# Patient Record
Sex: MALE | Race: WHITE | NOT HISPANIC OR LATINO | Employment: OTHER | URBAN - METROPOLITAN AREA
[De-identification: names, ages, dates, MRNs, and addresses within clinical notes are randomized per-mention and may not be internally consistent; named-entity substitution may affect disease eponyms.]

---

## 2017-01-03 ENCOUNTER — OFFICE VISIT (OUTPATIENT)
Dept: FAMILY MEDICINE CLINIC | Facility: CLINIC | Age: 77
End: 2017-01-03

## 2017-01-03 VITALS
WEIGHT: 166.9 LBS | DIASTOLIC BLOOD PRESSURE: 78 MMHG | HEART RATE: 84 BPM | HEIGHT: 71 IN | RESPIRATION RATE: 16 BRPM | BODY MASS INDEX: 23.36 KG/M2 | SYSTOLIC BLOOD PRESSURE: 128 MMHG | OXYGEN SATURATION: 96 % | TEMPERATURE: 98.4 F

## 2017-01-03 DIAGNOSIS — J47.9 BRONCHIECTASIS WITHOUT COMPLICATION (HCC): Primary | ICD-10-CM

## 2017-01-03 DIAGNOSIS — J18.9 PNEUMONIA OF LEFT UPPER LOBE DUE TO INFECTIOUS ORGANISM: ICD-10-CM

## 2017-01-03 PROCEDURE — 99214 OFFICE O/P EST MOD 30 MIN: CPT | Performed by: INTERNAL MEDICINE

## 2017-01-03 RX ORDER — METHYLPHENIDATE HYDROCHLORIDE 10 MG/1
10 TABLET ORAL
Qty: 90 TABLET | Refills: 0 | Status: ON HOLD | OUTPATIENT
Start: 2017-01-03 | End: 2017-06-02

## 2017-01-03 RX ORDER — ALBUTEROL SULFATE 1.25 MG/3ML
1 SOLUTION RESPIRATORY (INHALATION) EVERY 6 HOURS PRN
Qty: 360 ML | Refills: 3 | Status: ON HOLD | OUTPATIENT
Start: 2017-01-03 | End: 2017-05-05

## 2017-01-03 RX ORDER — BENZONATATE 200 MG/1
200 CAPSULE ORAL 2 TIMES DAILY PRN
Qty: 30 CAPSULE | Refills: 0 | Status: SHIPPED | OUTPATIENT
Start: 2017-01-03 | End: 2017-04-04

## 2017-01-03 RX ORDER — CLOTRIMAZOLE AND BETAMETHASONE DIPROPIONATE 10; .64 MG/G; MG/G
CREAM TOPICAL 2 TIMES DAILY
Qty: 15 G | Refills: 0 | Status: SHIPPED | OUTPATIENT
Start: 2017-01-03 | End: 2017-01-06 | Stop reason: SDUPTHER

## 2017-01-03 RX ORDER — FLUOXETINE HYDROCHLORIDE 60 MG/1
60 TABLET, FILM COATED ORAL; ORAL DAILY
Qty: 30 TABLET | Refills: 3 | Status: SHIPPED | OUTPATIENT
Start: 2017-01-03 | End: 2017-10-13 | Stop reason: SDUPTHER

## 2017-01-03 NOTE — PROGRESS NOTES
Subjective   Tony Leonard is a 76 y.o. male.     History of Present Illness   Patient was seen for follow-up from the hospital for pneumonia.  He's been on albuterol mini nebs with Spiriva.  He also is taking Levaquin.  He did have a groin rash consistent with fungus.  Patient states he is much improved from the hospital did not want to adjust his antibiotics or breathing treatment.  He was given Lotrisone cream for his rash.  He did have his albuterol refilled.  He did not want an x-ray.    Much of this encounter note is an electronic transcription/translation of spoken language to printed text.  The electronic translation of spoken language may permit erroneous, or at times, nonsensical words or phrases to be inadvertently transcribed.  Although I have reviewed the note for such errors, some may still exist.  The following portions of the patient's history were reviewed and updated as appropriate: allergies, current medications, past family history, past medical history, past social history, past surgical history and problem list.    Review of Systems   Constitutional: Negative for fatigue and fever.   HENT: Positive for congestion. Negative for trouble swallowing.    Eyes: Negative for discharge and visual disturbance.   Respiratory: Positive for cough and wheezing. Negative for choking and shortness of breath.    Cardiovascular: Negative for chest pain and palpitations.   Gastrointestinal: Negative for abdominal pain and blood in stool.   Endocrine: Negative.    Genitourinary: Negative for genital sores and hematuria.   Musculoskeletal: Negative for gait problem and joint swelling.   Skin: Negative for color change, pallor, rash and wound.   Allergic/Immunologic: Positive for environmental allergies. Negative for immunocompromised state.   Neurological: Negative for facial asymmetry and speech difficulty.   Psychiatric/Behavioral: Negative for hallucinations and suicidal ideas.       Objective   Physical  Exam   Constitutional: He is oriented to person, place, and time. He appears well-developed and well-nourished.   HENT:   Head: Normocephalic.   Eyes: Conjunctivae are normal. Pupils are equal, round, and reactive to light.   Neck: Normal range of motion. Neck supple.   Cardiovascular: Normal rate, regular rhythm and normal heart sounds.  Exam reveals no gallop and no friction rub.    No murmur heard.  Pulmonary/Chest: Effort normal. No respiratory distress. He has wheezes. He has no rales. He exhibits no tenderness.   Abdominal: Soft. Bowel sounds are normal.   Musculoskeletal: Normal range of motion.   Neurological: He is alert and oriented to person, place, and time.   Skin: Skin is warm and dry.   Psychiatric: He has a normal mood and affect. His behavior is normal. Judgment and thought content normal.   Nursing note and vitals reviewed.      Assessment/Plan   Problems Addressed this Visit        Respiratory    Bronchiectasis without complication - Primary    Relevant Medications    tiotropium (SPIRIVA) 18 MCG per inhalation capsule    albuterol (ACCUNEB) 1.25 MG/3ML nebulizer solution    benzonatate (TESSALON) 200 MG capsule    Pneumonia of left upper lobe due to infectious organism    Relevant Medications    tiotropium (SPIRIVA) 18 MCG per inhalation capsule    albuterol (ACCUNEB) 1.25 MG/3ML nebulizer solution    benzonatate (TESSALON) 200 MG capsule

## 2017-01-03 NOTE — MR AVS SNAPSHOT
Tony Leonard   1/3/2017 5:45 PM   Office Visit    Dept Phone:  [phone]   Encounter #:  [id number]    Provider:  Erich Aviles MD   Department:  [facility] FAMILY AND INTERNAL MED                Your Full Care Plan              Today's Medication Changes          These changes are accurate as of: 1/3/17  6:13 PM.  If you have any questions, ask your nurse or doctor.               New Medication(s)Ordered:     benzonatate 200 MG capsule   Commonly known as:  TESSALON   Take 1 capsule by mouth 2 (Two) Times a Day As Needed for cough.   Started by:  Erich Aviles MD       clotrimazole-betamethasone 1-0.05 % cream   Commonly known as:  LOTRISONE   Apply  topically 2 (Two) Times a Day.   Started by:  Erich Aviles MD         Medication(s)that have changed:     albuterol 1.25 MG/3ML nebulizer solution   Commonly known as:  ACCUNEB   Take 3 mL by nebulization Every 6 (Six) Hours As Needed for wheezing.   What changed:  Another medication with the same name was removed. Continue taking this medication, and follow the directions you see here.   Changed by:  Erich Aviles MD       FLUoxetine 60 MG tablet   Commonly known as:  PROzac   Take 1 tablet by mouth Daily.   What changed:  how much to take   Changed by:  Erich Aviles MD         Stop taking medication(s)listed here:     HYDROcod Polst-CPM Polst ER 10-8 MG/5ML ER suspension   Commonly known as:  TUSSIONEX PENNKINETIC   Stopped by:  Erich Aviles MD           Umeclidinium-Vilanterol 62.5-25 MCG/INH aerosol powder    Stopped by:  Erich Aviles MD                Where to Get Your Medications      These medications were sent to [address] - [address] [place] [address] AT [facility] & (CHET[place] A - [phone]  - [phone] [place] [address]     Phone:  [phone]     albuterol 1.25 MG/3ML nebulizer solution    benzonatate 200 MG capsule    clotrimazole-betamethasone 1-0.05 % cream    FLUoxetine 60 MG tablet         You can get these medications from any pharmacy     Bring a paper prescription for each of these medications     methylphenidate 10 MG tablet                  Your Updated Medication List          This list is accurate as of: 1/3/17  6:13 PM.  Always use your most recent med list.                albuterol 1.25 MG/3ML nebulizer solution   Commonly known as:  ACCUNEB   Take 3 mL by nebulization Every 6 (Six) Hours As Needed for wheezing.       benzonatate 200 MG capsule   Commonly known as:  TESSALON   Take 1 capsule by mouth 2 (Two) Times a Day As Needed for cough.       clotrimazole-betamethasone 1-0.05 % cream   Commonly known as:  LOTRISONE   Apply  topically 2 (Two) Times a Day.       cyclobenzaprine 10 MG tablet   Commonly known as:  FLEXERIL   Take 1 tablet by mouth 3 (three) times a day as needed for muscle spasms. Do not drive       esomeprazole 40 MG capsule   Commonly known as:  nexIUM   Take 1 capsule by mouth every morning before breakfast.       fexofenadine 60 MG tablet   Commonly known as:  ALLEGRA       FLUoxetine 60 MG tablet   Commonly known as:  PROzac   Take 1 tablet by mouth Daily.       glucosamine-chondroitin 500-400 MG capsule capsule       levoFLOXacin 750 MG tablet   Commonly known as:  LEVAQUIN   Take 1 tablet by mouth Daily.       methylphenidate 10 MG tablet   Commonly known as:  RITALIN   Take 1 tablet by mouth 3 (Three) Times a Day With Meals.       MULTIVITAMIN ADULT PO       NF FORMULAS TESTOSTERONE PO       predniSONE 10 MG tablet   Commonly known as:  DELTASONE   4 tabs daily for 2 days, 3 tabs daily for 2 days, 2 tabs daily for 2 days, 1 tab daily for 2 day then stop       tamsulosin 0.4 MG capsule 24 hr capsule   Commonly known as:  FLOMAX   Take 1 capsule by mouth Every Night for 30 days.       tiotropium 18 MCG per inhalation capsule   Commonly known as:  SPIRIVA               You Were Diagnosed With   "      Codes Comments    Bronchiectasis without complication    -  Primary ICD-10-CM: J47.9  ICD-9-CM: 494.0     Pneumonia of left upper lobe due to infectious organism     ICD-10-CM: J18.9  ICD-9-CM: 483.8       Instructions     None    Patient Instructions History      Upcoming Appointments     Visit Type Date Time Department    SAME DAY 1/3/2017  5:45 PM [facility]    SAME DAY 1/17/2017 10:15 AM [facility]  [facility]      MyChart Signup     Our records indicate that you have declined [facility] MyChart signup. If you would like to sign up for [employer], please email [email or web address] or call [phone] to obtain an activation code.             Other Info from Your Visit           Your Appointments     Jan 17, 2017 10:15 AM EST   Same Day with Erich Aviles MD   [facility] FAMILY AND INTERNAL MED (--)    [address] [facility] [address] [phone]              Allergies     Azithromycin      Latex      Sulfa Antibiotics  Rash      Reason for Visit     Med Refill d/w doc from hosp visit    Sore Throat thick mucous    Rash groin area      Vital Signs     Blood Pressure Pulse Temperature Respirations Height Weight    128/78 84 98.4 °F (36.9 °C) 16 71\" (180.3 cm) 166 lb 14.4 oz (75.7 kg)    Oxygen Saturation Body Mass Index Smoking Status             96% 23.28 kg/m2 Never Smoker         Problems and Diagnoses Noted     Abnormal dilation of airways    Pneumonia of left upper lobe due to infectious organism      No Longer an Issue     Bacterial lobar pneumonia    Chronic bronchitis        "

## 2017-01-06 RX ORDER — CLOTRIMAZOLE AND BETAMETHASONE DIPROPIONATE 10; .64 MG/G; MG/G
CREAM TOPICAL 2 TIMES DAILY
Qty: 45 G | Refills: 0 | Status: SHIPPED | OUTPATIENT
Start: 2017-01-06 | End: 2017-07-22

## 2017-01-17 ENCOUNTER — OFFICE VISIT (OUTPATIENT)
Dept: FAMILY MEDICINE CLINIC | Facility: CLINIC | Age: 77
End: 2017-01-17

## 2017-01-17 VITALS
SYSTOLIC BLOOD PRESSURE: 120 MMHG | RESPIRATION RATE: 14 BRPM | DIASTOLIC BLOOD PRESSURE: 68 MMHG | HEART RATE: 76 BPM | TEMPERATURE: 97.5 F | WEIGHT: 172 LBS | OXYGEN SATURATION: 98 % | BODY MASS INDEX: 24.08 KG/M2 | HEIGHT: 71 IN

## 2017-01-17 DIAGNOSIS — J18.9 PNEUMONIA OF LEFT UPPER LOBE DUE TO INFECTIOUS ORGANISM: Primary | ICD-10-CM

## 2017-01-17 DIAGNOSIS — J44.9 CHRONIC OBSTRUCTIVE PULMONARY DISEASE, UNSPECIFIED COPD TYPE (HCC): ICD-10-CM

## 2017-01-17 DIAGNOSIS — E78.5 DYSLIPIDEMIA: ICD-10-CM

## 2017-01-17 PROCEDURE — 99214 OFFICE O/P EST MOD 30 MIN: CPT | Performed by: INTERNAL MEDICINE

## 2017-01-17 RX ORDER — GUAIFENESIN 600 MG/1
1200 TABLET, EXTENDED RELEASE ORAL 2 TIMES DAILY
Qty: 120 TABLET | Refills: 3
Start: 2017-01-17 | End: 2017-02-15 | Stop reason: SDUPTHER

## 2017-01-17 RX ORDER — BUDESONIDE 0.5 MG/2ML
0.5 INHALANT ORAL
COMMUNITY
End: 2017-02-15

## 2017-01-17 NOTE — PROGRESS NOTES
Subjective   Tony Leonard is a 76 y.o. male.     History of Present Illness   A she is following up for pneumonia.  He has finished his Levaquin.  She still has a residual cough but is nonproductive.  Think going on for approximately 2 weeks.  He has slight congestion is been using Mucinex for the congestion.  It has been working.  Patient did not want an x-ray and will follow-up in 3 months.    Much of this encounter note is an electronic transcription/translation of spoken language to printed text.  The electronic translation of spoken language may permit erroneous, or at times, nonsensical words or phrases to be inadvertently transcribed.  Although I have reviewed the note for such errors, some may still exist.  The following portions of the patient's history were reviewed and updated as appropriate: allergies, current medications, past family history, past medical history, past social history, past surgical history and problem list.    Review of Systems   Constitutional: Negative for fatigue and fever.   HENT: Positive for congestion. Negative for trouble swallowing.    Eyes: Negative for discharge and visual disturbance.   Respiratory: Positive for cough. Negative for choking and shortness of breath.    Cardiovascular: Negative for chest pain and palpitations.   Gastrointestinal: Negative for abdominal pain and blood in stool.   Endocrine: Negative.    Genitourinary: Negative for genital sores and hematuria.   Musculoskeletal: Negative for gait problem and joint swelling.   Skin: Negative for color change, pallor, rash and wound.   Allergic/Immunologic: Positive for environmental allergies. Negative for immunocompromised state.   Neurological: Negative for facial asymmetry and speech difficulty.   Psychiatric/Behavioral: Negative for hallucinations and suicidal ideas.       Objective   Physical Exam   Constitutional: He is oriented to person, place, and time. He appears well-developed and well-nourished.    HENT:   Head: Normocephalic.   Eyes: Conjunctivae are normal. Pupils are equal, round, and reactive to light.   Neck: Normal range of motion. Neck supple.   Cardiovascular: Normal rate, regular rhythm and normal heart sounds.  Exam reveals no gallop and no friction rub.    No murmur heard.  Pulmonary/Chest: Effort normal and breath sounds normal. No respiratory distress. He has no wheezes. He has no rales. He exhibits no tenderness.   Abdominal: Soft. Bowel sounds are normal.   Musculoskeletal: Normal range of motion.   Neurological: He is alert and oriented to person, place, and time.   Skin: Skin is warm and dry.   Psychiatric: He has a normal mood and affect. His behavior is normal. Judgment and thought content normal.   Nursing note and vitals reviewed.      Assessment/Plan   Problems Addressed this Visit        Respiratory    Pneumonia of left upper lobe due to infectious organism - Primary    Relevant Medications    budesonide (PULMICORT) 0.5 MG/2ML nebulizer solution    tiotropium (SPIRIVA) 18 MCG per inhalation capsule    guaiFENesin (MUCINEX) 600 MG 12 hr tablet    COPD (chronic obstructive pulmonary disease)    Relevant Medications    budesonide (PULMICORT) 0.5 MG/2ML nebulizer solution    tiotropium (SPIRIVA) 18 MCG per inhalation capsule    guaiFENesin (MUCINEX) 600 MG 12 hr tablet       Other    Dyslipidemia

## 2017-01-17 NOTE — MR AVS SNAPSHOT
Tony Leonard   1/17/2017 10:15 AM   Office Visit    Dept Phone:  [phone]   Encounter #:  [id number]    Provider:  Erich Aviles MD   Department:  [facility] FAMILY AND INTERNAL MED                Your Full Care Plan              Today's Medication Changes          These changes are accurate as of: 1/17/17 10:47 AM.  If you have any questions, ask your nurse or doctor.               New Medication(s)Ordered:     guaiFENesin 600 MG 12 hr tablet   Commonly known as:  MUCINEX   Take 2 tablets by mouth 2 (Two) Times a Day.   Started by:  Erich Aviles MD         Stop taking medication(s)listed here:     levoFLOXacin 750 MG tablet   Commonly known as:  LEVAQUIN   Stopped by:  Erich Aviles MD           predniSONE 10 MG tablet   Commonly known as:  DELTASONE   Stopped by:  Erich Aviles MD                Where to Get Your Medications      These medications were sent to [facility] [address] [phone] [facility] [phone] [address] [address]     Phone:  [phone]     tiotropium 18 MCG per inhalation capsule         Information about where to get these medications is not yet available     ! Ask your nurse or doctor about these medications     guaiFENesin 600 MG 12 hr tablet                  Your Updated Medication List          This list is accurate as of: 1/17/17 10:47 AM.  Always use your most recent med list.                albuterol 1.25 MG/3ML nebulizer solution   Commonly known as:  ACCUNEB   Take 3 mL by nebulization Every 6 (Six) Hours As Needed for wheezing.       benzonatate 200 MG capsule   Commonly known as:  TESSALON   Take 1 capsule by mouth 2 (Two) Times a Day As Needed for cough.       budesonide 0.5 MG/2ML nebulizer solution   Commonly known as:  PULMICORT       clotrimazole-betamethasone 1-0.05 % cream   Commonly known as:  LOTRISONE   Apply  topically 2 (Two) Times  a Day.       cyclobenzaprine 10 MG tablet   Commonly known as:  FLEXERIL   Take 1 tablet by mouth 3 (three) times a day as needed for muscle spasms. Do not drive       esomeprazole 40 MG capsule   Commonly known as:  nexIUM   Take 1 capsule by mouth every morning before breakfast.       fexofenadine 60 MG tablet   Commonly known as:  ALLEGRA       FLUoxetine 60 MG tablet   Commonly known as:  PROzac   Take 1 tablet by mouth Daily.       glucosamine-chondroitin 500-400 MG capsule capsule       guaiFENesin 600 MG 12 hr tablet   Commonly known as:  MUCINEX   Take 2 tablets by mouth 2 (Two) Times a Day.       methylphenidate 10 MG tablet   Commonly known as:  RITALIN   Take 1 tablet by mouth 3 (Three) Times a Day With Meals.       MULTIVITAMIN ADULT PO       NF FORMULAS TESTOSTERONE PO       tamsulosin 0.4 MG capsule 24 hr capsule   Commonly known as:  FLOMAX   Take 1 capsule by mouth Every Night for 30 days.       TESTOSTERONE BU       tiotropium 18 MCG per inhalation capsule   Commonly known as:  SPIRIVA   Place 1 capsule into inhaler and inhale Daily.               You Were Diagnosed With        Codes Comments    Pneumonia of left upper lobe due to infectious organism    -  Primary ICD-10-CM: J18.9  ICD-9-CM: 483.8     Chronic obstructive pulmonary disease, unspecified COPD type     ICD-10-CM: J44.9  ICD-9-CM: 496     Dyslipidemia     ICD-10-CM: E78.5  ICD-9-CM: 272.4       Instructions     None    Patient Instructions History      Upcoming Appointments     Visit Type Date Time Department    SAME DAY 1/17/2017 10:15 AM [facility]    OFFICE VISIT 4/25/2017 10:30 AM [facility]      MyChart Signup     Our records indicate that you have declined [employer] signup. If you would like to sign up for [employer], please email [email or web address] or call [phone] to obtain an activation code.             Other Info from Your Visit           Your Appointments     Apr 25, 2017 10:30 AM EDT   Office  "Visit with Erich Aviles MD   [facility] FAMILY AND INTERNAL MED (--)    [address] [facility] [address] [phone]           Arrive 15 minutes prior to appointment.              Allergies     Azithromycin      Latex      Sulfa Antibiotics  Rash      Reason for Visit     Follow-up pneumonia/Pt states feeling better      Vital Signs     Blood Pressure Pulse Temperature Respirations Height Weight    120/68 (BP Location: Left arm, Patient Position: Sitting, Cuff Size: Adult) 76 97.5 °F (36.4 °C) (Oral) 14 71\" (180.3 cm) 172 lb (78 kg)    Oxygen Saturation Body Mass Index Smoking Status             98% 23.99 kg/m2 Never Smoker         Problems and Diagnoses Noted     Chronic airway obstruction    Dyslipidemia    Pneumonia of left upper lobe due to infectious organism        "

## 2017-02-09 ENCOUNTER — TELEPHONE (OUTPATIENT)
Dept: FAMILY MEDICINE CLINIC | Facility: CLINIC | Age: 77
End: 2017-02-09

## 2017-02-15 ENCOUNTER — OFFICE VISIT (OUTPATIENT)
Dept: FAMILY MEDICINE CLINIC | Facility: CLINIC | Age: 77
End: 2017-02-15

## 2017-02-15 VITALS
HEART RATE: 81 BPM | OXYGEN SATURATION: 97 % | HEIGHT: 71 IN | DIASTOLIC BLOOD PRESSURE: 60 MMHG | WEIGHT: 166.8 LBS | RESPIRATION RATE: 16 BRPM | BODY MASS INDEX: 23.35 KG/M2 | TEMPERATURE: 97.9 F | SYSTOLIC BLOOD PRESSURE: 92 MMHG

## 2017-02-15 DIAGNOSIS — J44.9 CHRONIC OBSTRUCTIVE PULMONARY DISEASE, UNSPECIFIED COPD TYPE (HCC): ICD-10-CM

## 2017-02-15 DIAGNOSIS — R53.83 FATIGUE, UNSPECIFIED TYPE: ICD-10-CM

## 2017-02-15 DIAGNOSIS — R05.9 COUGH: Primary | ICD-10-CM

## 2017-02-15 DIAGNOSIS — J18.9 PNEUMONIA OF LEFT UPPER LOBE DUE TO INFECTIOUS ORGANISM: ICD-10-CM

## 2017-02-15 LAB
ERYTHROCYTE [DISTWIDTH] IN BLOOD BY AUTOMATED COUNT: 13.1 % (ref 4.5–15)
HCT VFR BLD AUTO: 38.9 % (ref 35–60)
HGB BLD-MCNC: 13 G/DL (ref 13.5–18)
LYMPHOCYTES # BLD AUTO: 2.4 10*3/MM3 (ref 1.2–3.4)
LYMPHOCYTES NFR BLD AUTO: 25.6 % (ref 21–51)
MCH RBC QN AUTO: 30.9 PG (ref 26.1–33.1)
MCHC RBC AUTO-ENTMCNC: 33.5 G/DL (ref 33–37)
MCV RBC AUTO: 92.5 FL (ref 80–99)
MONOCYTES # BLD AUTO: 0.7 10*3/MM3 (ref 0.1–0.6)
MONOCYTES NFR BLD AUTO: 7.5 % (ref 2–9)
NEUTROPHILS # BLD AUTO: 6.2 10*3/MM3 (ref 1.4–6.5)
NEUTROPHILS NFR BLD AUTO: 66.9 % (ref 42–75)
PLATELET # BLD AUTO: 260 10*3/MM3 (ref 150–450)
PMV BLD AUTO: 6.9 FL (ref 7.1–10.5)
RBC # BLD AUTO: 4.21 10*6/MM3 (ref 4–6)
WBC NRBC COR # BLD: 9.3 10*3/MM3 (ref 4.5–10)

## 2017-02-15 PROCEDURE — 71020 CHG CHEST X-RAY 2 VW: CPT | Performed by: NURSE PRACTITIONER

## 2017-02-15 PROCEDURE — 99213 OFFICE O/P EST LOW 20 MIN: CPT | Performed by: NURSE PRACTITIONER

## 2017-02-15 PROCEDURE — 85025 COMPLETE CBC W/AUTO DIFF WBC: CPT | Performed by: NURSE PRACTITIONER

## 2017-02-15 RX ORDER — GUAIFENESIN 600 MG/1
1200 TABLET, EXTENDED RELEASE ORAL 2 TIMES DAILY
Qty: 120 TABLET | Refills: 3
Start: 2017-02-15 | End: 2017-06-21 | Stop reason: HOSPADM

## 2017-02-15 RX ORDER — DOXYCYCLINE HYCLATE 100 MG/1
100 TABLET, DELAYED RELEASE ORAL 2 TIMES DAILY
Qty: 14 TABLET | Refills: 0 | Status: SHIPPED | OUTPATIENT
Start: 2017-02-15 | End: 2017-03-28 | Stop reason: HOSPADM

## 2017-02-15 NOTE — PATIENT INSTRUCTIONS
CXR today, will call with results.  Start doxycycline 100mg 2x day for 7 days, finish whole prescription.  Cont albuterol nebulizer as needed,  Cont home O2 2L at night.   Stop spiriva, trial Anoro inhaler 1 puff daily. If any problems stop med and call office. Do not take spiriva and anoro at the same time. smple given and can reassess at next appt with Dr. Aviles in 2 weeks.   CBC, WNL.   May cont mucinex as needed.  Increase fluid intake, get plenty of rest.   Cont scheduled f/u with Dr. Salter.   If SOA , fever, fatigue worsens, patient instructed to go to ER.   Patient agrees with plan of care and understands instructions. Call if worsening symptoms or any problems or concerns.

## 2017-02-15 NOTE — PROGRESS NOTES
Subjective   Tony Leonard is a 76 y.o. male.     History of Present Illness   C/o cough and fatigue. He has productive cough and yellow mucous. He states he feels better today. He states yesterday he as so fatigued he could hardly get up. He has an extensive history of COPD and had pneumonia recently right and left upper lobes. He sees Dr. Salter, pulmonology. He uses oxygen 2L NC every night before. He uses a nebulizer as needed during the day. He denies fever. He is willing to get an xray of his chest today as he has not had a f/u xray since 12/31/16.  He last saw pulm on 12/31/16.  He will f/u with Dr. elliott on 3/2/17.   The following portions of the patient's history were reviewed and updated as appropriate: allergies, current medications, past family history, past medical history, past social history, past surgical history and problem list.     Review of Systems   Constitutional: Positive for fatigue. Negative for chills, diaphoresis and fever.   HENT: Positive for congestion. Negative for ear pain, rhinorrhea, sinus pressure and sore throat.    Respiratory: Positive for cough. Negative for chest tightness, shortness of breath and wheezing.    Cardiovascular: Negative for chest pain.   Musculoskeletal: Negative for arthralgias and myalgias.   Neurological: Negative for dizziness, weakness, light-headedness and headaches.   All other systems reviewed and are negative.      Objective   Physical Exam   Constitutional: He is oriented to person, place, and time. He appears well-developed and well-nourished.   HENT:   Head: Normocephalic.   Eyes: Pupils are equal, round, and reactive to light.   Neck: Neck supple.   Cardiovascular: Normal rate, regular rhythm and normal heart sounds.    Pulmonary/Chest: Effort normal. No respiratory distress. He has wheezes in the right upper field, the right lower field, the left upper field and the left lower field. He has rhonchi in the right upper field, the right lower field,  the left upper field and the left lower field.   Lymphadenopathy:     He has no cervical adenopathy.   Neurological: He is alert and oriented to person, place, and time.   Skin: Skin is warm and dry.   Psychiatric: He has a normal mood and affect. His behavior is normal. Judgment and thought content normal.   Nursing note and vitals reviewed.      Assessment/Plan   Tony was seen today for cough.    Diagnoses and all orders for this visit:    Cough  -     XR Chest 2 View  -     CBC & Differential  -     CBC Auto Differential    Fatigue, unspecified type  -     XR Chest 2 View  -     CBC & Differential  -     CBC Auto Differential    Chronic obstructive pulmonary disease, unspecified COPD type  -     XR Chest 2 View  -     CBC & Differential  -     CBC Auto Differential    Pneumonia of left upper lobe due to infectious organism  -     XR Chest 2 View  -     CBC & Differential  -     CBC Auto Differential    Other orders  -     doxycycline (DORYX) 100 MG enteric coated tablet; Take 1 tablet by mouth 2 (Two) Times a Day.  -     guaiFENesin (MUCINEX) 600 MG 12 hr tablet; Take 2 tablets by mouth 2 (Two) Times a Day.        Cxr 2V today for cough, fatigue, with comparison from old cxr from 12/31/16 showed pneumonia in RUL and GWENDOLYN, today shows asymmetry, awaiting radiology over read.   CXR today, will call with results.  Start doxycycline 100mg 2x day for 7 days, finish whole prescription.  Cont albuterol nebulizer as needed,  Cont home O2 2L at night.   Stop spiriva, trial Anoro inhaler 1 puff daily. If any problems stop med and call office. Do not take spiriva and anoro at the same time. smple given and can reassess at next appt with Dr. Aviles in 2 weeks.   CBC, WNL.   May cont mucinex as needed.  Increase fluid intake, get plenty of rest.   Cont scheduled f/u with Dr. Salter.   If SOA , fever, fatigue worsens, patient instructed to go to ER.   Discusses plan of care with Dr. Erich Aviles.   Patient agrees with plan of  care and understands instructions. Call if worsening symptoms or any problems or concerns.

## 2017-02-22 ENCOUNTER — TELEPHONE (OUTPATIENT)
Dept: FAMILY MEDICINE CLINIC | Facility: CLINIC | Age: 77
End: 2017-02-22

## 2017-02-22 NOTE — TELEPHONE ENCOUNTER
----- Message from PABLITO Means sent at 2/22/2017  3:21 PM EST -----  Contact: PATIENT [phone] NEIGHBORS PHONE   He needs to be seen. If he is SOA or in distress he needs to go to ER.   ----- Message -----     From: Daniela Davis MA     Sent: 2/22/2017   1:56 PM       To: PABLITO Means        ----- Message -----     From: Mili Juarez     Sent: 2/22/2017   1:08 PM       To: Daniela Davis MA    PATIENT WAS SEEN ON 02/15/17 FOR COUGH  AND WAS GIVEN doxycycline (DORYX) 100 MG enteric coated tablet. PATIENT IS STILL SPITTING UP MUCUS AND WANTED TO KNOW IF A REFILL COULD BE CALLED INTO PHARMACY       [address] [place] [address] AT [facility] [address] & ([address] - [phone]  - [phone] (Phone)        Pt informed to go to ER if gets worse

## 2017-02-22 NOTE — TELEPHONE ENCOUNTER
----- Message from PABLITO Means sent at 2/22/2017  4:04 PM EST -----  Please call patient with results. His f/u cxr was normal on 2/15/17, he should be seen if symptoms persist, and go to ER if any worsening symptoms.    Pt informed of CXR results and to go to ER if gets worse

## 2017-03-02 ENCOUNTER — OFFICE VISIT (OUTPATIENT)
Dept: FAMILY MEDICINE CLINIC | Facility: CLINIC | Age: 77
End: 2017-03-02

## 2017-03-02 VITALS
BODY MASS INDEX: 22.82 KG/M2 | DIASTOLIC BLOOD PRESSURE: 58 MMHG | RESPIRATION RATE: 18 BRPM | WEIGHT: 163 LBS | OXYGEN SATURATION: 92 % | SYSTOLIC BLOOD PRESSURE: 98 MMHG | HEART RATE: 82 BPM | TEMPERATURE: 97.8 F | HEIGHT: 71 IN

## 2017-03-02 DIAGNOSIS — M15.9 PRIMARY OSTEOARTHRITIS INVOLVING MULTIPLE JOINTS: ICD-10-CM

## 2017-03-02 DIAGNOSIS — J42 CHRONIC BRONCHITIS, UNSPECIFIED CHRONIC BRONCHITIS TYPE (HCC): ICD-10-CM

## 2017-03-02 DIAGNOSIS — J18.9 PNEUMONIA OF LEFT UPPER LOBE DUE TO INFECTIOUS ORGANISM: Primary | ICD-10-CM

## 2017-03-02 DIAGNOSIS — R27.0 ATAXIA: ICD-10-CM

## 2017-03-02 PROCEDURE — 99214 OFFICE O/P EST MOD 30 MIN: CPT | Performed by: INTERNAL MEDICINE

## 2017-03-02 PROCEDURE — 71020 XR CHEST 2 VW: CPT | Performed by: INTERNAL MEDICINE

## 2017-03-02 NOTE — PROGRESS NOTES
Subjective   Tony Leonard is a 76 y.o. male.     History of Present Illness   Patient was seen for follow-up on pneumonia.  He was doing extremely well finished HIS antibiotics.  He was informed to use his albuterol mini neb as needed and his chest x-ray was clear.  His bronchitis is been well-controlled on the mini neb.  He did complain some ataxia referred to neurologist.  His osteoarthritis been well stable medications.    X-Ray  Interpretation report in house X-rays that I personally viewed    Relevant Clinical Issues/Diagnoses/Indications:  Pneumonia chest x-ray        Clinical Findings:  No acute disease          Comparative Data:  No previous x-ray          Date of Previous X-ray:  Change on current X-ray      Much of this encounter note is an electronic transcription/translation of spoken language to printed text.  The electronic translation of spoken language may permit erroneous, or at times, nonsensical words or phrases to be inadvertently transcribed.  Although I have reviewed the note for such errors, some may still exist.  The following portions of the patient's history were reviewed and updated as appropriate: allergies, current medications, past family history, past medical history, past social history, past surgical history and problem list.    Review of Systems   Constitutional: Negative for fatigue and fever.   HENT: Positive for congestion. Negative for trouble swallowing.    Eyes: Negative for discharge and visual disturbance.   Respiratory: Negative for choking and shortness of breath.    Cardiovascular: Negative for chest pain and palpitations.   Gastrointestinal: Negative for abdominal pain and blood in stool.   Endocrine: Negative.    Genitourinary: Negative for genital sores and hematuria.   Musculoskeletal: Negative for gait problem and joint swelling.   Skin: Negative for color change, pallor, rash and wound.   Allergic/Immunologic: Positive for environmental allergies. Negative for  immunocompromised state.   Neurological: Negative for facial asymmetry and speech difficulty.   Psychiatric/Behavioral: Negative for hallucinations and suicidal ideas.       Objective   Physical Exam   Constitutional: He is oriented to person, place, and time. He appears well-developed and well-nourished.   HENT:   Head: Normocephalic.   Eyes: Conjunctivae are normal. Pupils are equal, round, and reactive to light.   Neck: Normal range of motion. Neck supple.   Cardiovascular: Normal rate, regular rhythm and normal heart sounds.    Pulmonary/Chest: Effort normal and breath sounds normal.   Abdominal: Soft. Bowel sounds are normal.   Musculoskeletal: Normal range of motion.   Neurological: He is alert and oriented to person, place, and time.   Skin: Skin is warm and dry.   Psychiatric: He has a normal mood and affect. His behavior is normal. Judgment and thought content normal.   Nursing note and vitals reviewed.      Assessment/Plan   Problems Addressed this Visit        Respiratory    Pneumonia of left upper lobe due to infectious organism - Primary    Relevant Medications    Umeclidinium-Vilanterol (ANORO ELLIPTA) 62.5-25 MCG/INH aerosol powder     Other Relevant Orders    XR Chest 2 View (Completed)    COPD (chronic obstructive pulmonary disease)    Relevant Medications    Umeclidinium-Vilanterol (ANORO ELLIPTA) 62.5-25 MCG/INH aerosol powder        Musculoskeletal and Integument    Primary osteoarthritis involving multiple joints      Other Visit Diagnoses     Ataxia        Relevant Orders    Ambulatory Referral to Neurology (Completed)

## 2017-03-26 ENCOUNTER — APPOINTMENT (OUTPATIENT)
Dept: GENERAL RADIOLOGY | Facility: HOSPITAL | Age: 77
End: 2017-03-26

## 2017-03-26 ENCOUNTER — HOSPITAL ENCOUNTER (INPATIENT)
Facility: HOSPITAL | Age: 77
LOS: 2 days | Discharge: HOME | End: 2017-03-28
Attending: EMERGENCY MEDICINE | Admitting: INTERNAL MEDICINE

## 2017-03-26 DIAGNOSIS — J44.1 COPD EXACERBATION (HCC): ICD-10-CM

## 2017-03-26 DIAGNOSIS — J18.9 PNEUMONIA OF LEFT LOWER LOBE DUE TO INFECTIOUS ORGANISM: Primary | ICD-10-CM

## 2017-03-26 LAB
ALBUMIN SERPL-MCNC: 3.4 G/DL (ref 3.5–5.2)
ALBUMIN/GLOB SERPL: 1.1 G/DL
ALP SERPL-CCNC: 62 U/L (ref 39–117)
ALT SERPL W P-5'-P-CCNC: 12 U/L (ref 1–41)
ANION GAP SERPL CALCULATED.3IONS-SCNC: 13 MMOL/L
ARTERIAL PATENCY WRIST A: POSITIVE
AST SERPL-CCNC: 14 U/L (ref 1–40)
ATMOSPHERIC PRESS: 750.5 MMHG
BASE EXCESS BLDA CALC-SCNC: 1.8 MMOL/L (ref 0–2)
BASOPHILS # BLD AUTO: 0.05 10*3/MM3 (ref 0–0.2)
BASOPHILS NFR BLD AUTO: 0.6 % (ref 0–1.5)
BDY SITE: ABNORMAL
BILIRUB SERPL-MCNC: 0.8 MG/DL (ref 0.1–1.2)
BUN BLD-MCNC: 17 MG/DL (ref 8–23)
BUN/CREAT SERPL: 18.3 (ref 7–25)
CALCIUM SPEC-SCNC: 9.1 MG/DL (ref 8.6–10.5)
CHLORIDE SERPL-SCNC: 102 MMOL/L (ref 98–107)
CO2 SERPL-SCNC: 26 MMOL/L (ref 22–29)
CREAT BLD-MCNC: 0.93 MG/DL (ref 0.76–1.27)
DEPRECATED RDW RBC AUTO: 46.2 FL (ref 37–54)
EOSINOPHIL # BLD AUTO: 0.68 10*3/MM3 (ref 0–0.7)
EOSINOPHIL NFR BLD AUTO: 7.7 % (ref 0.3–6.2)
ERYTHROCYTE [DISTWIDTH] IN BLOOD BY AUTOMATED COUNT: 13.5 % (ref 11.5–14.5)
GAS FLOW AIRWAY: 2 LPM
GFR SERPL CREATININE-BSD FRML MDRD: 79 ML/MIN/1.73
GLOBULIN UR ELPH-MCNC: 3.2 GM/DL
GLUCOSE BLD-MCNC: 92 MG/DL (ref 65–99)
HCO3 BLDA-SCNC: 27.6 MMOL/L (ref 22–28)
HCT VFR BLD AUTO: 41.8 % (ref 40.4–52.2)
HGB BLD-MCNC: 13.9 G/DL (ref 13.7–17.6)
HOLD SPECIMEN: NORMAL
HOLD SPECIMEN: NORMAL
IMM GRANULOCYTES # BLD: 0 10*3/MM3 (ref 0–0.03)
IMM GRANULOCYTES NFR BLD: 0 % (ref 0–0.5)
LYMPHOCYTES # BLD AUTO: 1.79 10*3/MM3 (ref 0.9–4.8)
LYMPHOCYTES NFR BLD AUTO: 20.2 % (ref 19.6–45.3)
MCH RBC QN AUTO: 31.1 PG (ref 27–32.7)
MCHC RBC AUTO-ENTMCNC: 33.3 G/DL (ref 32.6–36.4)
MCV RBC AUTO: 93.5 FL (ref 79.8–96.2)
MODALITY: ABNORMAL
MONOCYTES # BLD AUTO: 0.81 10*3/MM3 (ref 0.2–1.2)
MONOCYTES NFR BLD AUTO: 9.1 % (ref 5–12)
NEUTROPHILS # BLD AUTO: 5.53 10*3/MM3 (ref 1.9–8.1)
NEUTROPHILS NFR BLD AUTO: 62.4 % (ref 42.7–76)
NT-PROBNP SERPL-MCNC: 494.5 PG/ML (ref 0–1800)
PCO2 BLDA: 46.9 MM HG (ref 35–45)
PH BLDA: 7.38 PH UNITS (ref 7.35–7.45)
PLATELET # BLD AUTO: 261 10*3/MM3 (ref 140–500)
PMV BLD AUTO: 9.6 FL (ref 6–12)
PO2 BLDA: 72.5 MM HG (ref 80–100)
POTASSIUM BLD-SCNC: 4.2 MMOL/L (ref 3.5–5.2)
PROT SERPL-MCNC: 6.6 G/DL (ref 6–8.5)
RBC # BLD AUTO: 4.47 10*6/MM3 (ref 4.6–6)
SAO2 % BLDCOA: 93.8 % (ref 92–99)
SET MECH RESP RATE: 20
SODIUM BLD-SCNC: 141 MMOL/L (ref 136–145)
TROPONIN T SERPL-MCNC: <0.01 NG/ML (ref 0–0.03)
WBC NRBC COR # BLD: 8.86 10*3/MM3 (ref 4.5–10.7)
WHOLE BLOOD HOLD SPECIMEN: NORMAL
WHOLE BLOOD HOLD SPECIMEN: NORMAL

## 2017-03-26 PROCEDURE — 25010000003 CEFTRIAXONE PER 250 MG: Performed by: EMERGENCY MEDICINE

## 2017-03-26 PROCEDURE — 99285 EMERGENCY DEPT VISIT HI MDM: CPT

## 2017-03-26 PROCEDURE — 36415 COLL VENOUS BLD VENIPUNCTURE: CPT

## 2017-03-26 PROCEDURE — 85025 COMPLETE CBC W/AUTO DIFF WBC: CPT | Performed by: EMERGENCY MEDICINE

## 2017-03-26 PROCEDURE — 36600 WITHDRAWAL OF ARTERIAL BLOOD: CPT

## 2017-03-26 PROCEDURE — 93010 ELECTROCARDIOGRAM REPORT: CPT | Performed by: INTERNAL MEDICINE

## 2017-03-26 PROCEDURE — 80053 COMPREHEN METABOLIC PANEL: CPT | Performed by: EMERGENCY MEDICINE

## 2017-03-26 PROCEDURE — 84484 ASSAY OF TROPONIN QUANT: CPT | Performed by: EMERGENCY MEDICINE

## 2017-03-26 PROCEDURE — 93005 ELECTROCARDIOGRAM TRACING: CPT | Performed by: EMERGENCY MEDICINE

## 2017-03-26 PROCEDURE — 82803 BLOOD GASES ANY COMBINATION: CPT

## 2017-03-26 PROCEDURE — 94640 AIRWAY INHALATION TREATMENT: CPT

## 2017-03-26 PROCEDURE — 83880 ASSAY OF NATRIURETIC PEPTIDE: CPT | Performed by: EMERGENCY MEDICINE

## 2017-03-26 PROCEDURE — 25010000002 METHYLPREDNISOLONE PER 125 MG: Performed by: EMERGENCY MEDICINE

## 2017-03-26 PROCEDURE — 71020 HC CHEST PA AND LATERAL: CPT

## 2017-03-26 PROCEDURE — 94799 UNLISTED PULMONARY SVC/PX: CPT

## 2017-03-26 PROCEDURE — 87040 BLOOD CULTURE FOR BACTERIA: CPT | Performed by: EMERGENCY MEDICINE

## 2017-03-26 RX ORDER — ALBUTEROL SULFATE 1.25 MG/3ML
1 SOLUTION RESPIRATORY (INHALATION) EVERY 6 HOURS PRN
Status: DISCONTINUED | OUTPATIENT
Start: 2017-03-26 | End: 2017-03-27

## 2017-03-26 RX ORDER — SODIUM CHLORIDE 0.9 % (FLUSH) 0.9 %
10 SYRINGE (ML) INJECTION AS NEEDED
Status: DISCONTINUED | OUTPATIENT
Start: 2017-03-26 | End: 2017-03-28 | Stop reason: HOSPADM

## 2017-03-26 RX ORDER — SODIUM CHLORIDE 0.9 % (FLUSH) 0.9 %
1-10 SYRINGE (ML) INJECTION AS NEEDED
Status: DISCONTINUED | OUTPATIENT
Start: 2017-03-26 | End: 2017-03-28 | Stop reason: HOSPADM

## 2017-03-26 RX ORDER — METHYLPHENIDATE HYDROCHLORIDE 10 MG/1
10 TABLET ORAL
Status: DISCONTINUED | OUTPATIENT
Start: 2017-03-27 | End: 2017-03-28 | Stop reason: HOSPADM

## 2017-03-26 RX ORDER — ALBUTEROL SULFATE 2.5 MG/3ML
2.5 SOLUTION RESPIRATORY (INHALATION)
Status: DISCONTINUED | OUTPATIENT
Start: 2017-03-26 | End: 2017-03-27

## 2017-03-26 RX ORDER — CLOTRIMAZOLE AND BETAMETHASONE DIPROPIONATE 10; .64 MG/G; MG/G
CREAM TOPICAL 2 TIMES DAILY
Status: DISCONTINUED | OUTPATIENT
Start: 2017-03-27 | End: 2017-03-28

## 2017-03-26 RX ORDER — FLUOXETINE HYDROCHLORIDE 20 MG/1
60 CAPSULE ORAL DAILY
Status: DISCONTINUED | OUTPATIENT
Start: 2017-03-27 | End: 2017-03-28 | Stop reason: HOSPADM

## 2017-03-26 RX ORDER — IPRATROPIUM BROMIDE AND ALBUTEROL SULFATE 2.5; .5 MG/3ML; MG/3ML
3 SOLUTION RESPIRATORY (INHALATION) ONCE
Status: COMPLETED | OUTPATIENT
Start: 2017-03-26 | End: 2017-03-26

## 2017-03-26 RX ORDER — METHYLPREDNISOLONE SODIUM SUCCINATE 125 MG/2ML
60 INJECTION, POWDER, LYOPHILIZED, FOR SOLUTION INTRAMUSCULAR; INTRAVENOUS EVERY 8 HOURS
Status: DISCONTINUED | OUTPATIENT
Start: 2017-03-27 | End: 2017-03-27

## 2017-03-26 RX ORDER — CEFTRIAXONE SODIUM 2 G/50ML
2 INJECTION, SOLUTION INTRAVENOUS ONCE
Status: COMPLETED | OUTPATIENT
Start: 2017-03-26 | End: 2017-03-26

## 2017-03-26 RX ORDER — HEPARIN SODIUM 5000 [USP'U]/ML
5000 INJECTION, SOLUTION INTRAVENOUS; SUBCUTANEOUS EVERY 12 HOURS SCHEDULED
Status: DISCONTINUED | OUTPATIENT
Start: 2017-03-26 | End: 2017-03-28 | Stop reason: HOSPADM

## 2017-03-26 RX ORDER — CYCLOBENZAPRINE HCL 10 MG
10 TABLET ORAL 3 TIMES DAILY PRN
Status: DISCONTINUED | OUTPATIENT
Start: 2017-03-26 | End: 2017-03-28 | Stop reason: HOSPADM

## 2017-03-26 RX ORDER — CETIRIZINE HYDROCHLORIDE 10 MG/1
5 TABLET ORAL DAILY
Status: DISCONTINUED | OUTPATIENT
Start: 2017-03-27 | End: 2017-03-28 | Stop reason: HOSPADM

## 2017-03-26 RX ORDER — PANTOPRAZOLE SODIUM 40 MG/1
40 TABLET, DELAYED RELEASE ORAL
Status: DISCONTINUED | OUTPATIENT
Start: 2017-03-27 | End: 2017-03-28 | Stop reason: HOSPADM

## 2017-03-26 RX ORDER — IPRATROPIUM BROMIDE AND ALBUTEROL SULFATE 2.5; .5 MG/3ML; MG/3ML
3 SOLUTION RESPIRATORY (INHALATION) ONCE
Status: DISCONTINUED | OUTPATIENT
Start: 2017-03-26 | End: 2017-03-27

## 2017-03-26 RX ORDER — ALBUTEROL SULFATE 2.5 MG/3ML
2.5 SOLUTION RESPIRATORY (INHALATION)
Status: COMPLETED | OUTPATIENT
Start: 2017-03-26 | End: 2017-03-26

## 2017-03-26 RX ORDER — METHYLPREDNISOLONE SODIUM SUCCINATE 125 MG/2ML
125 INJECTION, POWDER, LYOPHILIZED, FOR SOLUTION INTRAMUSCULAR; INTRAVENOUS ONCE
Status: COMPLETED | OUTPATIENT
Start: 2017-03-26 | End: 2017-03-26

## 2017-03-26 RX ORDER — GUAIFENESIN 600 MG/1
1200 TABLET, EXTENDED RELEASE ORAL 2 TIMES DAILY
Status: DISCONTINUED | OUTPATIENT
Start: 2017-03-27 | End: 2017-03-28 | Stop reason: HOSPADM

## 2017-03-26 RX ADMIN — ALBUTEROL SULFATE 2.5 MG: 2.5 SOLUTION RESPIRATORY (INHALATION) at 13:26

## 2017-03-26 RX ADMIN — ALBUTEROL SULFATE 2.5 MG: 2.5 SOLUTION RESPIRATORY (INHALATION) at 13:58

## 2017-03-26 RX ADMIN — DOXYCYCLINE 100 MG: 100 INJECTION, POWDER, LYOPHILIZED, FOR SOLUTION INTRAVENOUS at 16:10

## 2017-03-26 RX ADMIN — CEFTRIAXONE SODIUM 2 G: 2 INJECTION, SOLUTION INTRAVENOUS at 21:38

## 2017-03-26 RX ADMIN — ALBUTEROL SULFATE 2.5 MG: 2.5 SOLUTION RESPIRATORY (INHALATION) at 23:24

## 2017-03-26 RX ADMIN — METHYLPREDNISOLONE SODIUM SUCCINATE 125 MG: 125 INJECTION, POWDER, FOR SOLUTION INTRAMUSCULAR; INTRAVENOUS at 14:06

## 2017-03-26 RX ADMIN — IPRATROPIUM BROMIDE AND ALBUTEROL SULFATE 3 ML: .5; 3 SOLUTION RESPIRATORY (INHALATION) at 13:26

## 2017-03-27 LAB
ALBUMIN SERPL-MCNC: 3.3 G/DL (ref 3.5–5.2)
ALBUMIN/GLOB SERPL: 1 G/DL
ALP SERPL-CCNC: 61 U/L (ref 39–117)
ALT SERPL W P-5'-P-CCNC: 11 U/L (ref 1–41)
ANION GAP SERPL CALCULATED.3IONS-SCNC: 14.3 MMOL/L
AST SERPL-CCNC: 14 U/L (ref 1–40)
B PERT DNA SPEC QL NAA+PROBE: NOT DETECTED
BASOPHILS # BLD AUTO: 0 10*3/MM3 (ref 0–0.2)
BASOPHILS NFR BLD AUTO: 0 % (ref 0–1.5)
BILIRUB SERPL-MCNC: 0.4 MG/DL (ref 0.1–1.2)
BUN BLD-MCNC: 18 MG/DL (ref 8–23)
BUN/CREAT SERPL: 20.9 (ref 7–25)
C PNEUM DNA NPH QL NAA+NON-PROBE: NOT DETECTED
CALCIUM SPEC-SCNC: 8.9 MG/DL (ref 8.6–10.5)
CHLORIDE SERPL-SCNC: 103 MMOL/L (ref 98–107)
CHOLEST SERPL-MCNC: 176 MG/DL (ref 0–200)
CO2 SERPL-SCNC: 22.7 MMOL/L (ref 22–29)
CREAT BLD-MCNC: 0.86 MG/DL (ref 0.76–1.27)
DEPRECATED RDW RBC AUTO: 45.1 FL (ref 37–54)
EOSINOPHIL # BLD AUTO: 0 10*3/MM3 (ref 0–0.7)
EOSINOPHIL NFR BLD AUTO: 0 % (ref 0.3–6.2)
ERYTHROCYTE [DISTWIDTH] IN BLOOD BY AUTOMATED COUNT: 13.1 % (ref 11.5–14.5)
FLUAV H1 2009 PAND RNA NPH QL NAA+PROBE: NOT DETECTED
FLUAV H1 HA GENE NPH QL NAA+PROBE: NOT DETECTED
FLUAV H3 RNA NPH QL NAA+PROBE: NOT DETECTED
FLUAV SUBTYP SPEC NAA+PROBE: NOT DETECTED
FLUBV RNA ISLT QL NAA+PROBE: NOT DETECTED
GFR SERPL CREATININE-BSD FRML MDRD: 86 ML/MIN/1.73
GLOBULIN UR ELPH-MCNC: 3.2 GM/DL
GLUCOSE BLD-MCNC: 168 MG/DL (ref 65–99)
HADV DNA SPEC NAA+PROBE: NOT DETECTED
HCOV 229E RNA SPEC QL NAA+PROBE: NOT DETECTED
HCOV HKU1 RNA SPEC QL NAA+PROBE: NOT DETECTED
HCOV NL63 RNA SPEC QL NAA+PROBE: NOT DETECTED
HCOV OC43 RNA SPEC QL NAA+PROBE: NOT DETECTED
HCT VFR BLD AUTO: 40.2 % (ref 40.4–52.2)
HDLC SERPL-MCNC: 53 MG/DL (ref 40–60)
HGB BLD-MCNC: 13.4 G/DL (ref 13.7–17.6)
HMPV RNA NPH QL NAA+NON-PROBE: NOT DETECTED
HPIV1 RNA SPEC QL NAA+PROBE: NOT DETECTED
HPIV2 RNA SPEC QL NAA+PROBE: NOT DETECTED
HPIV3 RNA NPH QL NAA+PROBE: NOT DETECTED
HPIV4 P GENE NPH QL NAA+PROBE: NOT DETECTED
IMM GRANULOCYTES # BLD: 0.02 10*3/MM3 (ref 0–0.03)
IMM GRANULOCYTES NFR BLD: 0.3 % (ref 0–0.5)
L PNEUMO1 AG UR QL IA: NEGATIVE
LDLC SERPL CALC-MCNC: 113 MG/DL (ref 0–100)
LDLC/HDLC SERPL: 2.14 {RATIO}
LYMPHOCYTES # BLD AUTO: 0.52 10*3/MM3 (ref 0.9–4.8)
LYMPHOCYTES NFR BLD AUTO: 8.5 % (ref 19.6–45.3)
M PNEUMO IGG SER IA-ACNC: NOT DETECTED
MCH RBC QN AUTO: 31.1 PG (ref 27–32.7)
MCHC RBC AUTO-ENTMCNC: 33.3 G/DL (ref 32.6–36.4)
MCV RBC AUTO: 93.3 FL (ref 79.8–96.2)
MONOCYTES # BLD AUTO: 0.06 10*3/MM3 (ref 0.2–1.2)
MONOCYTES NFR BLD AUTO: 1 % (ref 5–12)
NEUTROPHILS # BLD AUTO: 5.51 10*3/MM3 (ref 1.9–8.1)
NEUTROPHILS NFR BLD AUTO: 90.2 % (ref 42.7–76)
PLATELET # BLD AUTO: 265 10*3/MM3 (ref 140–500)
PMV BLD AUTO: 9.8 FL (ref 6–12)
POTASSIUM BLD-SCNC: 4.1 MMOL/L (ref 3.5–5.2)
PROT SERPL-MCNC: 6.5 G/DL (ref 6–8.5)
RBC # BLD AUTO: 4.31 10*6/MM3 (ref 4.6–6)
RHINOVIRUS RNA SPEC NAA+PROBE: NOT DETECTED
RSV RNA NPH QL NAA+NON-PROBE: NOT DETECTED
S PNEUM AG SPEC QL LA: NEGATIVE
SODIUM BLD-SCNC: 140 MMOL/L (ref 136–145)
TRIGL SERPL-MCNC: 48 MG/DL (ref 0–150)
VLDLC SERPL-MCNC: 9.6 MG/DL (ref 5–40)
WBC NRBC COR # BLD: 6.11 10*3/MM3 (ref 4.5–10.7)

## 2017-03-27 PROCEDURE — 87486 CHLMYD PNEUM DNA AMP PROBE: CPT | Performed by: INTERNAL MEDICINE

## 2017-03-27 PROCEDURE — 87633 RESP VIRUS 12-25 TARGETS: CPT | Performed by: INTERNAL MEDICINE

## 2017-03-27 PROCEDURE — 87899 AGENT NOS ASSAY W/OPTIC: CPT | Performed by: INTERNAL MEDICINE

## 2017-03-27 PROCEDURE — 25010000003 CEFTRIAXONE PER 250 MG: Performed by: INTERNAL MEDICINE

## 2017-03-27 PROCEDURE — 25010000002 METHYLPREDNISOLONE PER 125 MG: Performed by: INTERNAL MEDICINE

## 2017-03-27 PROCEDURE — 94799 UNLISTED PULMONARY SVC/PX: CPT

## 2017-03-27 PROCEDURE — 25010000002 HEPARIN (PORCINE) PER 1000 UNITS: Performed by: INTERNAL MEDICINE

## 2017-03-27 PROCEDURE — 80061 LIPID PANEL: CPT | Performed by: INTERNAL MEDICINE

## 2017-03-27 PROCEDURE — 63710000001 PREDNISONE PER 1 MG: Performed by: INTERNAL MEDICINE

## 2017-03-27 PROCEDURE — 87581 M.PNEUMON DNA AMP PROBE: CPT | Performed by: INTERNAL MEDICINE

## 2017-03-27 PROCEDURE — 85025 COMPLETE CBC W/AUTO DIFF WBC: CPT | Performed by: INTERNAL MEDICINE

## 2017-03-27 PROCEDURE — 80053 COMPREHEN METABOLIC PANEL: CPT | Performed by: INTERNAL MEDICINE

## 2017-03-27 PROCEDURE — 87798 DETECT AGENT NOS DNA AMP: CPT | Performed by: INTERNAL MEDICINE

## 2017-03-27 PROCEDURE — 87449 NOS EACH ORGANISM AG IA: CPT | Performed by: INTERNAL MEDICINE

## 2017-03-27 RX ORDER — IPRATROPIUM BROMIDE AND ALBUTEROL SULFATE 2.5; .5 MG/3ML; MG/3ML
3 SOLUTION RESPIRATORY (INHALATION)
Status: DISCONTINUED | OUTPATIENT
Start: 2017-03-27 | End: 2017-03-28

## 2017-03-27 RX ORDER — PREDNISONE 20 MG/1
40 TABLET ORAL
Status: DISCONTINUED | OUTPATIENT
Start: 2017-03-27 | End: 2017-03-28 | Stop reason: HOSPADM

## 2017-03-27 RX ORDER — ALBUTEROL SULFATE 1.25 MG/3ML
1 SOLUTION RESPIRATORY (INHALATION) EVERY 4 HOURS PRN
Status: DISCONTINUED | OUTPATIENT
Start: 2017-03-27 | End: 2017-03-28 | Stop reason: HOSPADM

## 2017-03-27 RX ADMIN — IPRATROPIUM BROMIDE AND ALBUTEROL SULFATE 3 ML: .5; 3 SOLUTION RESPIRATORY (INHALATION) at 07:35

## 2017-03-27 RX ADMIN — CLOTRIMAZOLE AND BETAMETHASONE DIPROPIONATE: 10; .5 CREAM TOPICAL at 08:36

## 2017-03-27 RX ADMIN — IPRATROPIUM BROMIDE AND ALBUTEROL SULFATE 3 ML: .5; 3 SOLUTION RESPIRATORY (INHALATION) at 11:18

## 2017-03-27 RX ADMIN — HEPARIN SODIUM 5000 UNITS: 5000 INJECTION, SOLUTION INTRAVENOUS; SUBCUTANEOUS at 20:11

## 2017-03-27 RX ADMIN — METHYLPREDNISOLONE SODIUM SUCCINATE 60 MG: 125 INJECTION, POWDER, FOR SOLUTION INTRAMUSCULAR; INTRAVENOUS at 00:24

## 2017-03-27 RX ADMIN — IPRATROPIUM BROMIDE AND ALBUTEROL SULFATE 3 ML: .5; 3 SOLUTION RESPIRATORY (INHALATION) at 22:52

## 2017-03-27 RX ADMIN — DOXYCYCLINE 100 MG: 100 INJECTION, POWDER, LYOPHILIZED, FOR SOLUTION INTRAVENOUS at 03:56

## 2017-03-27 RX ADMIN — DOXYCYCLINE 100 MG: 100 INJECTION, POWDER, LYOPHILIZED, FOR SOLUTION INTRAVENOUS at 17:02

## 2017-03-27 RX ADMIN — TIOTROPIUM BROMIDE 1 CAPSULE: 18 CAPSULE ORAL; RESPIRATORY (INHALATION) at 07:35

## 2017-03-27 RX ADMIN — CEFTRIAXONE SODIUM 1 G: 1 INJECTION, SOLUTION INTRAVENOUS at 20:12

## 2017-03-27 RX ADMIN — CLOTRIMAZOLE AND BETAMETHASONE DIPROPIONATE: 10; .5 CREAM TOPICAL at 17:02

## 2017-03-27 RX ADMIN — GUAIFENESIN 1200 MG: 600 TABLET, EXTENDED RELEASE ORAL at 17:06

## 2017-03-27 RX ADMIN — IPRATROPIUM BROMIDE AND ALBUTEROL SULFATE 3 ML: .5; 3 SOLUTION RESPIRATORY (INHALATION) at 03:38

## 2017-03-27 RX ADMIN — GUAIFENESIN 1200 MG: 600 TABLET, EXTENDED RELEASE ORAL at 08:36

## 2017-03-27 RX ADMIN — PREDNISONE 40 MG: 20 TABLET ORAL at 12:13

## 2017-03-27 RX ADMIN — PANTOPRAZOLE SODIUM 40 MG: 40 TABLET, DELAYED RELEASE ORAL at 08:38

## 2017-03-27 RX ADMIN — IPRATROPIUM BROMIDE AND ALBUTEROL SULFATE 3 ML: .5; 3 SOLUTION RESPIRATORY (INHALATION) at 15:32

## 2017-03-27 RX ADMIN — CETIRIZINE HYDROCHLORIDE 5 MG: 10 TABLET, FILM COATED ORAL at 08:36

## 2017-03-27 RX ADMIN — HEPARIN SODIUM 5000 UNITS: 5000 INJECTION, SOLUTION INTRAVENOUS; SUBCUTANEOUS at 00:24

## 2017-03-27 RX ADMIN — HEPARIN SODIUM 5000 UNITS: 5000 INJECTION, SOLUTION INTRAVENOUS; SUBCUTANEOUS at 08:37

## 2017-03-28 VITALS
TEMPERATURE: 97.4 F | DIASTOLIC BLOOD PRESSURE: 56 MMHG | OXYGEN SATURATION: 97 % | SYSTOLIC BLOOD PRESSURE: 110 MMHG | RESPIRATION RATE: 18 BRPM | HEIGHT: 71 IN | HEART RATE: 88 BPM | BODY MASS INDEX: 21.7 KG/M2 | WEIGHT: 155 LBS

## 2017-03-28 PROBLEM — E44.1 MILD MALNUTRITION (HCC): Status: ACTIVE | Noted: 2017-03-28

## 2017-03-28 PROCEDURE — 94799 UNLISTED PULMONARY SVC/PX: CPT

## 2017-03-28 PROCEDURE — 25010000002 HEPARIN (PORCINE) PER 1000 UNITS: Performed by: INTERNAL MEDICINE

## 2017-03-28 PROCEDURE — 63710000001 PREDNISONE PER 1 MG: Performed by: INTERNAL MEDICINE

## 2017-03-28 RX ORDER — PREDNISONE 20 MG/1
40 TABLET ORAL
Qty: 7 TABLET | Refills: 0 | Status: SHIPPED | OUTPATIENT
Start: 2017-03-28 | End: 2017-04-04

## 2017-03-28 RX ORDER — CEFDINIR 300 MG/1
300 CAPSULE ORAL EVERY 12 HOURS SCHEDULED
Status: DISCONTINUED | OUTPATIENT
Start: 2017-03-28 | End: 2017-03-28 | Stop reason: HOSPADM

## 2017-03-28 RX ORDER — CEFDINIR 300 MG/1
300 CAPSULE ORAL EVERY 12 HOURS SCHEDULED
Qty: 14 CAPSULE | Refills: 0 | Status: SHIPPED | OUTPATIENT
Start: 2017-03-28 | End: 2017-04-04

## 2017-03-28 RX ADMIN — METHYLPHENIDATE HYDROCHLORIDE 10 MG: 10 TABLET ORAL at 09:27

## 2017-03-28 RX ADMIN — CETIRIZINE HYDROCHLORIDE 5 MG: 10 TABLET, FILM COATED ORAL at 09:27

## 2017-03-28 RX ADMIN — HEPARIN SODIUM 5000 UNITS: 5000 INJECTION, SOLUTION INTRAVENOUS; SUBCUTANEOUS at 09:28

## 2017-03-28 RX ADMIN — METHYLPHENIDATE HYDROCHLORIDE 10 MG: 10 TABLET ORAL at 12:34

## 2017-03-28 RX ADMIN — DOXYCYCLINE 100 MG: 100 INJECTION, POWDER, LYOPHILIZED, FOR SOLUTION INTRAVENOUS at 03:42

## 2017-03-28 RX ADMIN — TIOTROPIUM BROMIDE 1 CAPSULE: 18 CAPSULE ORAL; RESPIRATORY (INHALATION) at 08:08

## 2017-03-28 RX ADMIN — CEFDINIR 300 MG: 300 CAPSULE ORAL at 12:34

## 2017-03-28 RX ADMIN — IPRATROPIUM BROMIDE AND ALBUTEROL SULFATE 3 ML: .5; 3 SOLUTION RESPIRATORY (INHALATION) at 08:08

## 2017-03-28 RX ADMIN — PANTOPRAZOLE SODIUM 40 MG: 40 TABLET, DELAYED RELEASE ORAL at 05:24

## 2017-03-28 RX ADMIN — GUAIFENESIN 1200 MG: 600 TABLET, EXTENDED RELEASE ORAL at 09:28

## 2017-03-28 RX ADMIN — PREDNISONE 40 MG: 20 TABLET ORAL at 09:28

## 2017-03-28 RX ADMIN — FLUOXETINE 60 MG: 20 CAPSULE ORAL at 09:28

## 2017-03-28 RX ADMIN — ALBUTEROL SULFATE 1.25 MG: 1.25 SOLUTION RESPIRATORY (INHALATION) at 03:27

## 2017-03-31 LAB
BACTERIA SPEC AEROBE CULT: NORMAL
BACTERIA SPEC AEROBE CULT: NORMAL

## 2017-04-04 ENCOUNTER — OFFICE VISIT (OUTPATIENT)
Dept: FAMILY MEDICINE CLINIC | Facility: CLINIC | Age: 77
End: 2017-04-04

## 2017-04-04 VITALS
TEMPERATURE: 97.4 F | HEIGHT: 71 IN | DIASTOLIC BLOOD PRESSURE: 72 MMHG | SYSTOLIC BLOOD PRESSURE: 118 MMHG | WEIGHT: 162 LBS | HEART RATE: 94 BPM | OXYGEN SATURATION: 96 % | BODY MASS INDEX: 22.68 KG/M2

## 2017-04-04 DIAGNOSIS — R73.9 HYPERGLYCEMIA: ICD-10-CM

## 2017-04-04 DIAGNOSIS — J44.1 COPD EXACERBATION (HCC): ICD-10-CM

## 2017-04-04 DIAGNOSIS — J18.9 PNEUMONIA OF LEFT LOWER LOBE DUE TO INFECTIOUS ORGANISM: Primary | ICD-10-CM

## 2017-04-04 DIAGNOSIS — R53.82 CHRONIC FATIGUE: ICD-10-CM

## 2017-04-04 LAB
ALBUMIN SERPL-MCNC: 3.4 G/DL (ref 3.5–5.2)
ALBUMIN/GLOB SERPL: 1.4 G/DL
ALP SERPL-CCNC: 56 U/L (ref 39–117)
ALT SERPL W P-5'-P-CCNC: 24 U/L (ref 1–41)
ANION GAP SERPL CALCULATED.3IONS-SCNC: 13.8 MMOL/L
AST SERPL-CCNC: 15 U/L (ref 1–40)
BILIRUB SERPL-MCNC: 0.2 MG/DL (ref 0.1–1.2)
BUN BLD-MCNC: 23 MG/DL (ref 8–23)
BUN/CREAT SERPL: 20.9 (ref 7–25)
CALCIUM SPEC-SCNC: 8.9 MG/DL (ref 8.6–10.5)
CHLORIDE SERPL-SCNC: 101 MMOL/L (ref 98–107)
CO2 SERPL-SCNC: 25.2 MMOL/L (ref 22–29)
CREAT BLD-MCNC: 1.1 MG/DL (ref 0.76–1.27)
ERYTHROCYTE [DISTWIDTH] IN BLOOD BY AUTOMATED COUNT: 14 % (ref 4.5–15)
GFR SERPL CREATININE-BSD FRML MDRD: 65 ML/MIN/1.73
GLOBULIN UR ELPH-MCNC: 2.5 GM/DL
GLUCOSE BLD-MCNC: 114 MG/DL (ref 65–99)
HBA1C MFR BLD: 5.39 % (ref 4.8–5.6)
HCT VFR BLD AUTO: 40.5 % (ref 35–60)
HGB BLD-MCNC: 13.7 G/DL (ref 13.5–18)
LYMPHOCYTES # BLD AUTO: 2.5 10*3/MM3 (ref 1.2–3.4)
LYMPHOCYTES NFR BLD AUTO: 24.3 % (ref 21–51)
MCH RBC QN AUTO: 31 PG (ref 26.1–33.1)
MCHC RBC AUTO-ENTMCNC: 33.8 G/DL (ref 33–37)
MCV RBC AUTO: 91.7 FL (ref 80–99)
MONOCYTES # BLD AUTO: 0.7 10*3/MM3 (ref 0.1–0.6)
MONOCYTES NFR BLD AUTO: 6.8 % (ref 2–9)
NEUTROPHILS # BLD AUTO: 7 10*3/MM3 (ref 1.4–6.5)
NEUTROPHILS NFR BLD AUTO: 68.9 % (ref 42–75)
PLATELET # BLD AUTO: 314 10*3/MM3 (ref 150–450)
PMV BLD AUTO: 6.9 FL (ref 7.1–10.5)
POTASSIUM BLD-SCNC: 4.5 MMOL/L (ref 3.5–5.2)
PROT SERPL-MCNC: 5.9 G/DL (ref 6–8.5)
RBC # BLD AUTO: 4.42 10*6/MM3 (ref 4–6)
SODIUM BLD-SCNC: 140 MMOL/L (ref 136–145)
WBC NRBC COR # BLD: 10.2 10*3/MM3 (ref 4.5–10)

## 2017-04-04 PROCEDURE — 83036 HEMOGLOBIN GLYCOSYLATED A1C: CPT | Performed by: NURSE PRACTITIONER

## 2017-04-04 PROCEDURE — 85025 COMPLETE CBC W/AUTO DIFF WBC: CPT | Performed by: NURSE PRACTITIONER

## 2017-04-04 PROCEDURE — 80053 COMPREHEN METABOLIC PANEL: CPT | Performed by: NURSE PRACTITIONER

## 2017-04-04 PROCEDURE — 99214 OFFICE O/P EST MOD 30 MIN: CPT | Performed by: NURSE PRACTITIONER

## 2017-04-04 RX ORDER — ALBUTEROL SULFATE 90 UG/1
2 AEROSOL, METERED RESPIRATORY (INHALATION) EVERY 4 HOURS PRN
Qty: 1 INHALER | Refills: 5 | COMMUNITY
End: 2017-06-08 | Stop reason: HOSPADM

## 2017-04-04 RX ORDER — CEFDINIR 300 MG/1
300 CAPSULE ORAL EVERY 12 HOURS SCHEDULED
Qty: 14 CAPSULE | Refills: 0 | Status: SHIPPED | OUTPATIENT
Start: 2017-04-04 | End: 2017-04-11

## 2017-04-04 NOTE — PROGRESS NOTES
Subjective   Tony Leonard is a 76 y.o. male.     History of Present Illness   Here to FU on pna recently tx [facility] 03/26/17-03/28/17 was tx cefinir 300 mg BID x 1 wk and prednisone finished all medication, with CXR showing LLL pneumonia, malnutrion, COPD exacerbation was told to FU here 1 wk for FU and CXR repeat 4-6 wks, with hx of pneumonia 12/16, Taking anoro daily and albuterol twice daily, using 02 at home but states feels lke doesn't need it, has never seen pulm but was told to FU with pulm after hospitalization as has been treated recently for multiple episodes, now appetite improved no coughing wheezing SOA but very fatigued thinks maybe infection isn't cleared, Lives alone and able to prepare meals and housework but very tired     The following portions of the patient's history were reviewed and updated as appropriate: allergies, current medications, past family history, past medical history, past social history, past surgical history and problem list.    Review of Systems   Constitutional: Positive for fatigue. Negative for fever.   Respiratory: Negative for cough, shortness of breath and wheezing.    Cardiovascular: Negative for chest pain, palpitations and leg swelling.   Neurological: Negative for dizziness and headaches.   All other systems reviewed and are negative.      Objective   Physical Exam   Constitutional: He is oriented to person, place, and time. He appears well-developed and well-nourished.   fatigued   HENT:   Head: Normocephalic and atraumatic.   Eyes: Conjunctivae and EOM are normal. Pupils are equal, round, and reactive to light.   Cardiovascular: Normal rate, regular rhythm and normal heart sounds.    Pulmonary/Chest: Effort normal and breath sounds normal.   Musculoskeletal: Normal range of motion.   Neurological: He is alert and oriented to person, place, and time.   Skin: Skin is warm and dry.   Psychiatric: He has a normal mood and affect. His behavior is normal. Judgment and  thought content normal.   Vitals reviewed.      Assessment/Plan   Tony was seen today for follow-up.    Diagnoses and all orders for this visit:    Pneumonia of left lower lobe due to infectious organism  -     CBC & Differential  -     Comprehensive Metabolic Panel  -     CBC Auto Differential  -     Ambulatory Referral to Pulmonology    COPD exacerbation  -     Ambulatory Referral to Pulmonology    Hyperglycemia  -     Hemoglobin A1c    Chronic fatigue    Other orders  -     cefdinir (OMNICEF) 300 MG capsule; Take 1 capsule by mouth Every 12 (Twelve) Hours for 7 days. Indications: Pneumonia    reviewed hospital records see above HPI reviewed labs and imaging, repeat CBC shows trending WBC and increased neutrophils, will cont cefdinir 300 BID x 1 wk and RTC after finish abx to re-eval, will refer to pulm for multiple cases of pna, COPD, cont eating and gaining weight s/t malnutrition in hospital, if fatigue worsens or SOA wheezing or coughing go to ER for eval, increase H20 and rest

## 2017-04-19 ENCOUNTER — LAB (OUTPATIENT)
Dept: LAB | Facility: HOSPITAL | Age: 77
End: 2017-04-19

## 2017-04-19 ENCOUNTER — TRANSCRIBE ORDERS (OUTPATIENT)
Dept: ADMINISTRATIVE | Facility: HOSPITAL | Age: 77
End: 2017-04-19

## 2017-04-19 DIAGNOSIS — R26.89 BALANCE PROBLEM: ICD-10-CM

## 2017-04-19 DIAGNOSIS — Z01.810 PRE-OPERATIVE CARDIOVASCULAR EXAMINATION: ICD-10-CM

## 2017-04-19 DIAGNOSIS — R27.0 ATAXIA: ICD-10-CM

## 2017-04-19 DIAGNOSIS — G62.9 NEUROPATHY: Primary | ICD-10-CM

## 2017-04-19 DIAGNOSIS — R53.83 FATIGUE, UNSPECIFIED TYPE: ICD-10-CM

## 2017-04-19 DIAGNOSIS — G62.9 NEUROPATHY: ICD-10-CM

## 2017-04-19 DIAGNOSIS — R26.2 DIFFICULTY WALKING: ICD-10-CM

## 2017-04-19 DIAGNOSIS — D64.9 ANEMIA, UNSPECIFIED: ICD-10-CM

## 2017-04-19 LAB
FERRITIN SERPL-MCNC: 77.08 NG/ML (ref 30–400)
IRON 24H UR-MRATE: 57 MCG/DL (ref 59–158)
IRON SATN MFR SERPL: 19 % (ref 20–50)
T3 SERPL-MCNC: 103.4 NG/DL (ref 80–200)
T4 SERPL-MCNC: 5.43 MCG/DL (ref 4.5–11.7)
TIBC SERPL-MCNC: 302 MCG/DL (ref 298–536)
TRANSFERRIN SERPL-MCNC: 203 MG/DL (ref 200–360)
TSH SERPL DL<=0.05 MIU/L-ACNC: 2.12 MIU/ML (ref 0.27–4.2)
VIT B12 BLD-MCNC: >2000 PG/ML (ref 211–946)

## 2017-04-19 PROCEDURE — 83540 ASSAY OF IRON: CPT

## 2017-04-19 PROCEDURE — 36415 COLL VENOUS BLD VENIPUNCTURE: CPT

## 2017-04-19 PROCEDURE — 84466 ASSAY OF TRANSFERRIN: CPT

## 2017-04-19 PROCEDURE — 84480 ASSAY TRIIODOTHYRONINE (T3): CPT

## 2017-04-19 PROCEDURE — 86334 IMMUNOFIX E-PHORESIS SERUM: CPT

## 2017-04-19 PROCEDURE — 82728 ASSAY OF FERRITIN: CPT

## 2017-04-19 PROCEDURE — 84165 PROTEIN E-PHORESIS SERUM: CPT

## 2017-04-19 PROCEDURE — 82607 VITAMIN B-12: CPT

## 2017-04-19 PROCEDURE — 84436 ASSAY OF TOTAL THYROXINE: CPT

## 2017-04-19 PROCEDURE — 84155 ASSAY OF PROTEIN SERUM: CPT

## 2017-04-19 PROCEDURE — 84443 ASSAY THYROID STIM HORMONE: CPT

## 2017-04-20 LAB
ALBUMIN SERPL-MCNC: 3.3 G/DL (ref 2.9–4.4)
ALBUMIN/GLOB SERPL: 1.4 {RATIO} (ref 0.7–1.7)
ALPHA1 GLOB FLD ELPH-MCNC: 0.3 G/DL (ref 0–0.4)
ALPHA2 GLOB SERPL ELPH-MCNC: 0.5 G/DL (ref 0.4–1)
B-GLOBULIN SERPL ELPH-MCNC: 0.8 G/DL (ref 0.7–1.3)
GAMMA GLOB SERPL ELPH-MCNC: 0.9 G/DL (ref 0.4–1.8)
GLOBULIN SER CALC-MCNC: 2.5 G/DL (ref 2.2–3.9)
IGA SERPL-MCNC: 128 MG/DL (ref 61–437)
IGG SERPL-MCNC: 813 MG/DL (ref 700–1600)
IGM SERPL-MCNC: 63 MG/DL (ref 15–143)
INTERPRETATION SERPL IEP-IMP: ABNORMAL
Lab: ABNORMAL
M-SPIKE: ABNORMAL G/DL
PROT SERPL-MCNC: 5.8 G/DL (ref 6–8.5)

## 2017-04-27 ENCOUNTER — HOSPITAL ENCOUNTER (INPATIENT)
Facility: HOSPITAL | Age: 77
LOS: 8 days | Discharge: HOME | End: 2017-05-05
Attending: EMERGENCY MEDICINE | Admitting: INTERNAL MEDICINE

## 2017-04-27 ENCOUNTER — APPOINTMENT (OUTPATIENT)
Dept: CT IMAGING | Facility: HOSPITAL | Age: 77
End: 2017-04-27

## 2017-04-27 ENCOUNTER — APPOINTMENT (OUTPATIENT)
Dept: GENERAL RADIOLOGY | Facility: HOSPITAL | Age: 77
End: 2017-04-27

## 2017-04-27 DIAGNOSIS — R04.2 HEMOPTYSIS: ICD-10-CM

## 2017-04-27 DIAGNOSIS — J96.01 ACUTE RESPIRATORY FAILURE WITH HYPOXIA (HCC): Primary | ICD-10-CM

## 2017-04-27 DIAGNOSIS — J47.9 BRONCHIECTASIS WITHOUT COMPLICATION (HCC): ICD-10-CM

## 2017-04-27 DIAGNOSIS — J18.9 PNEUMONIA OF LEFT LOWER LOBE DUE TO INFECTIOUS ORGANISM: ICD-10-CM

## 2017-04-27 DIAGNOSIS — J44.1 COPD EXACERBATION (HCC): ICD-10-CM

## 2017-04-27 LAB
ALBUMIN SERPL-MCNC: 4.2 G/DL (ref 3.5–5.2)
ALBUMIN/GLOB SERPL: 1.4 G/DL
ALP SERPL-CCNC: 81 U/L (ref 39–117)
ALT SERPL W P-5'-P-CCNC: 14 U/L (ref 1–41)
ANION GAP SERPL CALCULATED.3IONS-SCNC: 11 MMOL/L
ARTERIAL PATENCY WRIST A: POSITIVE
AST SERPL-CCNC: 18 U/L (ref 1–40)
ATMOSPHERIC PRESS: 748.9 MMHG
BASE EXCESS BLDA CALC-SCNC: 2.9 MMOL/L (ref 0–2)
BASOPHILS # BLD AUTO: 0.09 10*3/MM3 (ref 0–0.2)
BASOPHILS NFR BLD AUTO: 1 % (ref 0–1.5)
BDY SITE: ABNORMAL
BILIRUB SERPL-MCNC: 0.3 MG/DL (ref 0.1–1.2)
BUN BLD-MCNC: 21 MG/DL (ref 8–23)
BUN/CREAT SERPL: 16.3 (ref 7–25)
CALCIUM SPEC-SCNC: 9.6 MG/DL (ref 8.6–10.5)
CHLORIDE SERPL-SCNC: 103 MMOL/L (ref 98–107)
CO2 SERPL-SCNC: 28 MMOL/L (ref 22–29)
CREAT BLD-MCNC: 1.29 MG/DL (ref 0.76–1.27)
D-LACTATE SERPL-SCNC: 1.6 MMOL/L (ref 0.5–2)
DEPRECATED RDW RBC AUTO: 50.5 FL (ref 37–54)
EOSINOPHIL # BLD AUTO: 1.57 10*3/MM3 (ref 0–0.7)
EOSINOPHIL NFR BLD AUTO: 17.1 % (ref 0.3–6.2)
ERYTHROCYTE [DISTWIDTH] IN BLOOD BY AUTOMATED COUNT: 14.5 % (ref 11.5–14.5)
GAS FLOW AIRWAY: 8 LPM
GFR SERPL CREATININE-BSD FRML MDRD: 54 ML/MIN/1.73
GLOBULIN UR ELPH-MCNC: 3 GM/DL
GLUCOSE BLD-MCNC: 139 MG/DL (ref 65–99)
HCO3 BLDA-SCNC: 27.1 MMOL/L (ref 22–28)
HCT VFR BLD AUTO: 44.5 % (ref 40.4–52.2)
HGB BLD-MCNC: 14.5 G/DL (ref 13.7–17.6)
HOLD SPECIMEN: NORMAL
IMM GRANULOCYTES # BLD: 0.02 10*3/MM3 (ref 0–0.03)
IMM GRANULOCYTES NFR BLD: 0.2 % (ref 0–0.5)
LYMPHOCYTES # BLD AUTO: 3.83 10*3/MM3 (ref 0.9–4.8)
LYMPHOCYTES NFR BLD AUTO: 41.6 % (ref 19.6–45.3)
MCH RBC QN AUTO: 31 PG (ref 27–32.7)
MCHC RBC AUTO-ENTMCNC: 32.6 G/DL (ref 32.6–36.4)
MCV RBC AUTO: 95.3 FL (ref 79.8–96.2)
MODALITY: ABNORMAL
MONOCYTES # BLD AUTO: 0.85 10*3/MM3 (ref 0.2–1.2)
MONOCYTES NFR BLD AUTO: 9.2 % (ref 5–12)
NEUTROPHILS # BLD AUTO: 2.84 10*3/MM3 (ref 1.9–8.1)
NEUTROPHILS NFR BLD AUTO: 30.9 % (ref 42.7–76)
NT-PROBNP SERPL-MCNC: 167.6 PG/ML (ref 0–1800)
PCO2 BLDA: 39.2 MM HG (ref 35–45)
PH BLDA: 7.45 PH UNITS (ref 7.35–7.45)
PLATELET # BLD AUTO: 274 10*3/MM3 (ref 140–500)
PMV BLD AUTO: 9.3 FL (ref 6–12)
PO2 BLDA: 58.2 MM HG (ref 80–100)
POTASSIUM BLD-SCNC: 3.9 MMOL/L (ref 3.5–5.2)
PROT SERPL-MCNC: 7.2 G/DL (ref 6–8.5)
RBC # BLD AUTO: 4.67 10*6/MM3 (ref 4.6–6)
SAO2 % BLDCOA: 91 % (ref 92–99)
SET MECH RESP RATE: 24
SODIUM BLD-SCNC: 142 MMOL/L (ref 136–145)
TROPONIN T SERPL-MCNC: <0.01 NG/ML (ref 0–0.03)
WBC NRBC COR # BLD: 9.2 10*3/MM3 (ref 4.5–10.7)
WHOLE BLOOD HOLD SPECIMEN: NORMAL

## 2017-04-27 PROCEDURE — 71010 HC CHEST PA OR AP: CPT

## 2017-04-27 PROCEDURE — 99285 EMERGENCY DEPT VISIT HI MDM: CPT

## 2017-04-27 PROCEDURE — 25010000003 CEFTRIAXONE PER 250 MG: Performed by: EMERGENCY MEDICINE

## 2017-04-27 PROCEDURE — 83880 ASSAY OF NATRIURETIC PEPTIDE: CPT | Performed by: EMERGENCY MEDICINE

## 2017-04-27 PROCEDURE — 94799 UNLISTED PULMONARY SVC/PX: CPT

## 2017-04-27 PROCEDURE — 93010 ELECTROCARDIOGRAM REPORT: CPT | Performed by: INTERNAL MEDICINE

## 2017-04-27 PROCEDURE — 36600 WITHDRAWAL OF ARTERIAL BLOOD: CPT

## 2017-04-27 PROCEDURE — 82803 BLOOD GASES ANY COMBINATION: CPT

## 2017-04-27 PROCEDURE — 84484 ASSAY OF TROPONIN QUANT: CPT | Performed by: EMERGENCY MEDICINE

## 2017-04-27 PROCEDURE — 94640 AIRWAY INHALATION TREATMENT: CPT

## 2017-04-27 PROCEDURE — 71275 CT ANGIOGRAPHY CHEST: CPT

## 2017-04-27 PROCEDURE — 85025 COMPLETE CBC W/AUTO DIFF WBC: CPT | Performed by: EMERGENCY MEDICINE

## 2017-04-27 PROCEDURE — 87040 BLOOD CULTURE FOR BACTERIA: CPT | Performed by: EMERGENCY MEDICINE

## 2017-04-27 PROCEDURE — 93005 ELECTROCARDIOGRAM TRACING: CPT

## 2017-04-27 PROCEDURE — 0 IOPAMIDOL PER 1 ML: Performed by: EMERGENCY MEDICINE

## 2017-04-27 PROCEDURE — 71020 HC CHEST PA AND LATERAL: CPT

## 2017-04-27 PROCEDURE — 83605 ASSAY OF LACTIC ACID: CPT | Performed by: EMERGENCY MEDICINE

## 2017-04-27 PROCEDURE — 25010000002 METHYLPREDNISOLONE PER 125 MG: Performed by: EMERGENCY MEDICINE

## 2017-04-27 PROCEDURE — 80053 COMPREHEN METABOLIC PANEL: CPT | Performed by: EMERGENCY MEDICINE

## 2017-04-27 RX ORDER — METHYLPREDNISOLONE SODIUM SUCCINATE 125 MG/2ML
125 INJECTION, POWDER, LYOPHILIZED, FOR SOLUTION INTRAMUSCULAR; INTRAVENOUS ONCE
Status: COMPLETED | OUTPATIENT
Start: 2017-04-27 | End: 2017-04-27

## 2017-04-27 RX ORDER — GUAIFENESIN AND DEXTROMETHORPHAN HYDROBROMIDE 100; 10 MG/5ML; MG/5ML
5 SOLUTION ORAL EVERY 12 HOURS
COMMUNITY
End: 2017-07-22

## 2017-04-27 RX ORDER — IPRATROPIUM BROMIDE AND ALBUTEROL SULFATE 2.5; .5 MG/3ML; MG/3ML
3 SOLUTION RESPIRATORY (INHALATION) ONCE
Status: COMPLETED | OUTPATIENT
Start: 2017-04-27 | End: 2017-04-27

## 2017-04-27 RX ORDER — CEFTRIAXONE SODIUM 1 G/50ML
1 INJECTION, SOLUTION INTRAVENOUS ONCE
Status: COMPLETED | OUTPATIENT
Start: 2017-04-27 | End: 2017-04-27

## 2017-04-27 RX ORDER — IPRATROPIUM BROMIDE AND ALBUTEROL SULFATE 2.5; .5 MG/3ML; MG/3ML
SOLUTION RESPIRATORY (INHALATION)
Status: COMPLETED
Start: 2017-04-27 | End: 2017-04-27

## 2017-04-27 RX ORDER — SODIUM CHLORIDE 0.9 % (FLUSH) 0.9 %
10 SYRINGE (ML) INJECTION AS NEEDED
Status: DISCONTINUED | OUTPATIENT
Start: 2017-04-27 | End: 2017-05-05 | Stop reason: HOSPADM

## 2017-04-27 RX ADMIN — IOPAMIDOL 95 ML: 755 INJECTION, SOLUTION INTRAVENOUS at 23:12

## 2017-04-27 RX ADMIN — IPRATROPIUM BROMIDE AND ALBUTEROL SULFATE 3 ML: .5; 3 SOLUTION RESPIRATORY (INHALATION) at 21:44

## 2017-04-27 RX ADMIN — DOXYCYCLINE 100 MG: 100 INJECTION, POWDER, LYOPHILIZED, FOR SOLUTION INTRAVENOUS at 22:41

## 2017-04-27 RX ADMIN — CEFTRIAXONE SODIUM 1 G: 1 INJECTION, SOLUTION INTRAVENOUS at 21:58

## 2017-04-27 RX ADMIN — IPRATROPIUM BROMIDE AND ALBUTEROL SULFATE 3 ML: 2.5; .5 SOLUTION RESPIRATORY (INHALATION) at 21:44

## 2017-04-27 RX ADMIN — METHYLPREDNISOLONE SODIUM SUCCINATE 125 MG: 125 INJECTION, POWDER, FOR SOLUTION INTRAMUSCULAR; INTRAVENOUS at 21:46

## 2017-04-28 ENCOUNTER — ANESTHESIA (OUTPATIENT)
Dept: GASTROENTEROLOGY | Facility: HOSPITAL | Age: 77
End: 2017-04-28

## 2017-04-28 ENCOUNTER — ANESTHESIA EVENT (OUTPATIENT)
Dept: GASTROENTEROLOGY | Facility: HOSPITAL | Age: 77
End: 2017-04-28

## 2017-04-28 LAB
ANION GAP SERPL CALCULATED.3IONS-SCNC: 12.2 MMOL/L
B PERT DNA SPEC QL NAA+PROBE: NOT DETECTED
BUN BLD-MCNC: 18 MG/DL (ref 8–23)
BUN/CREAT SERPL: 17.6 (ref 7–25)
C PNEUM DNA NPH QL NAA+NON-PROBE: NOT DETECTED
CALCIUM SPEC-SCNC: 8.5 MG/DL (ref 8.6–10.5)
CHLORIDE SERPL-SCNC: 105 MMOL/L (ref 98–107)
CO2 SERPL-SCNC: 22.8 MMOL/L (ref 22–29)
CREAT BLD-MCNC: 1.02 MG/DL (ref 0.76–1.27)
FLUAV H1 2009 PAND RNA NPH QL NAA+PROBE: NOT DETECTED
FLUAV H1 HA GENE NPH QL NAA+PROBE: NOT DETECTED
FLUAV H3 RNA NPH QL NAA+PROBE: NOT DETECTED
FLUAV SUBTYP SPEC NAA+PROBE: NOT DETECTED
FLUBV RNA ISLT QL NAA+PROBE: NOT DETECTED
GFR SERPL CREATININE-BSD FRML MDRD: 71 ML/MIN/1.73
GIE STN SPEC: NORMAL
GLUCOSE BLD-MCNC: 168 MG/DL (ref 65–99)
GLUCOSE BLDC GLUCOMTR-MCNC: 150 MG/DL (ref 70–130)
GLUCOSE BLDC GLUCOMTR-MCNC: 191 MG/DL (ref 70–130)
HADV DNA SPEC NAA+PROBE: NOT DETECTED
HCOV 229E RNA SPEC QL NAA+PROBE: NOT DETECTED
HCOV HKU1 RNA SPEC QL NAA+PROBE: NOT DETECTED
HCOV NL63 RNA SPEC QL NAA+PROBE: NOT DETECTED
HCOV OC43 RNA SPEC QL NAA+PROBE: NOT DETECTED
HMPV RNA NPH QL NAA+NON-PROBE: NOT DETECTED
HPIV1 RNA SPEC QL NAA+PROBE: NOT DETECTED
HPIV2 RNA SPEC QL NAA+PROBE: NOT DETECTED
HPIV3 RNA NPH QL NAA+PROBE: NOT DETECTED
HPIV4 P GENE NPH QL NAA+PROBE: NOT DETECTED
INR PPP: 1.11 (ref 0.9–1.1)
M PNEUMO IGG SER IA-ACNC: NOT DETECTED
POTASSIUM BLD-SCNC: 3.8 MMOL/L (ref 3.5–5.2)
PROCALCITONIN SERPL-MCNC: 0.07 NG/ML (ref 0.1–0.25)
PROTHROMBIN TIME: 13.9 SECONDS (ref 11.7–14.2)
RHINOVIRUS RNA SPEC NAA+PROBE: NOT DETECTED
RSV RNA NPH QL NAA+NON-PROBE: NOT DETECTED
SODIUM BLD-SCNC: 140 MMOL/L (ref 136–145)

## 2017-04-28 PROCEDURE — 0B9F8ZX DRAINAGE OF RIGHT LOWER LUNG LOBE, VIA NATURAL OR ARTIFICIAL OPENING ENDOSCOPIC, DIAGNOSTIC: ICD-10-PCS | Performed by: INTERNAL MEDICINE

## 2017-04-28 PROCEDURE — 87581 M.PNEUMON DNA AMP PROBE: CPT | Performed by: INTERNAL MEDICINE

## 2017-04-28 PROCEDURE — 25010000002 METHYLPREDNISOLONE PER 125 MG: Performed by: INTERNAL MEDICINE

## 2017-04-28 PROCEDURE — 87633 RESP VIRUS 12-25 TARGETS: CPT | Performed by: INTERNAL MEDICINE

## 2017-04-28 PROCEDURE — 88312 SPECIAL STAINS GROUP 1: CPT | Performed by: INTERNAL MEDICINE

## 2017-04-28 PROCEDURE — 87205 SMEAR GRAM STAIN: CPT | Performed by: INTERNAL MEDICINE

## 2017-04-28 PROCEDURE — 88112 CYTOPATH CELL ENHANCE TECH: CPT | Performed by: INTERNAL MEDICINE

## 2017-04-28 PROCEDURE — 94799 UNLISTED PULMONARY SVC/PX: CPT

## 2017-04-28 PROCEDURE — 87206 SMEAR FLUORESCENT/ACID STAI: CPT | Performed by: INTERNAL MEDICINE

## 2017-04-28 PROCEDURE — 88305 TISSUE EXAM BY PATHOLOGIST: CPT | Performed by: INTERNAL MEDICINE

## 2017-04-28 PROCEDURE — 80048 BASIC METABOLIC PNL TOTAL CA: CPT | Performed by: INTERNAL MEDICINE

## 2017-04-28 PROCEDURE — 25010000003 CEFTRIAXONE PER 250 MG: Performed by: INTERNAL MEDICINE

## 2017-04-28 PROCEDURE — 82962 GLUCOSE BLOOD TEST: CPT

## 2017-04-28 PROCEDURE — 84145 PROCALCITONIN (PCT): CPT | Performed by: INTERNAL MEDICINE

## 2017-04-28 PROCEDURE — 87070 CULTURE OTHR SPECIMN AEROBIC: CPT | Performed by: INTERNAL MEDICINE

## 2017-04-28 PROCEDURE — 85610 PROTHROMBIN TIME: CPT | Performed by: INTERNAL MEDICINE

## 2017-04-28 PROCEDURE — 87102 FUNGUS ISOLATION CULTURE: CPT | Performed by: INTERNAL MEDICINE

## 2017-04-28 PROCEDURE — 87798 DETECT AGENT NOS DNA AMP: CPT | Performed by: INTERNAL MEDICINE

## 2017-04-28 PROCEDURE — 25010000002 PROPOFOL 10 MG/ML EMULSION: Performed by: ANESTHESIOLOGY

## 2017-04-28 PROCEDURE — 87486 CHLMYD PNEUM DNA AMP PROBE: CPT | Performed by: INTERNAL MEDICINE

## 2017-04-28 PROCEDURE — 87116 MYCOBACTERIA CULTURE: CPT | Performed by: INTERNAL MEDICINE

## 2017-04-28 PROCEDURE — 25010000002 ENOXAPARIN PER 10 MG: Performed by: INTERNAL MEDICINE

## 2017-04-28 RX ORDER — CEFTRIAXONE SODIUM 1 G/50ML
1 INJECTION, SOLUTION INTRAVENOUS EVERY 24 HOURS
Status: DISCONTINUED | OUTPATIENT
Start: 2017-04-28 | End: 2017-05-05 | Stop reason: HOSPADM

## 2017-04-28 RX ORDER — METHYLPHENIDATE HYDROCHLORIDE 10 MG/1
10 TABLET ORAL
Status: DISCONTINUED | OUTPATIENT
Start: 2017-04-28 | End: 2017-04-28

## 2017-04-28 RX ORDER — ROPINIROLE 1 MG/1
1 TABLET, FILM COATED ORAL NIGHTLY
Status: DISCONTINUED | OUTPATIENT
Start: 2017-04-28 | End: 2017-05-05 | Stop reason: HOSPADM

## 2017-04-28 RX ORDER — SODIUM CHLORIDE, SODIUM LACTATE, POTASSIUM CHLORIDE, CALCIUM CHLORIDE 600; 310; 30; 20 MG/100ML; MG/100ML; MG/100ML; MG/100ML
30 INJECTION, SOLUTION INTRAVENOUS CONTINUOUS PRN
Status: DISCONTINUED | OUTPATIENT
Start: 2017-04-28 | End: 2017-05-02

## 2017-04-28 RX ORDER — PROPOFOL 10 MG/ML
VIAL (ML) INTRAVENOUS AS NEEDED
Status: DISCONTINUED | OUTPATIENT
Start: 2017-04-28 | End: 2017-04-28 | Stop reason: SURG

## 2017-04-28 RX ORDER — SODIUM CHLORIDE 9 MG/ML
125 INJECTION, SOLUTION INTRAVENOUS CONTINUOUS
Status: DISCONTINUED | OUTPATIENT
Start: 2017-04-28 | End: 2017-04-28

## 2017-04-28 RX ORDER — LIDOCAINE HYDROCHLORIDE 20 MG/ML
INJECTION, SOLUTION INFILTRATION; PERINEURAL AS NEEDED
Status: DISCONTINUED | OUTPATIENT
Start: 2017-04-28 | End: 2017-04-28 | Stop reason: SURG

## 2017-04-28 RX ORDER — ROPINIROLE 1 MG/1
1 TABLET, FILM COATED ORAL NIGHTLY
Status: ON HOLD | COMMUNITY
End: 2017-10-18

## 2017-04-28 RX ORDER — PROPOFOL 10 MG/ML
VIAL (ML) INTRAVENOUS CONTINUOUS PRN
Status: DISCONTINUED | OUTPATIENT
Start: 2017-04-28 | End: 2017-04-28 | Stop reason: SURG

## 2017-04-28 RX ORDER — ACETYLCYSTEINE 200 MG/ML
3 SOLUTION ORAL; RESPIRATORY (INHALATION)
Status: DISCONTINUED | OUTPATIENT
Start: 2017-04-28 | End: 2017-04-29

## 2017-04-28 RX ORDER — LIDOCAINE HYDROCHLORIDE 10 MG/ML
INJECTION, SOLUTION EPIDURAL; INFILTRATION; INTRACAUDAL; PERINEURAL AS NEEDED
Status: DISCONTINUED | OUTPATIENT
Start: 2017-04-28 | End: 2017-04-28 | Stop reason: HOSPADM

## 2017-04-28 RX ORDER — METHYLPREDNISOLONE SODIUM SUCCINATE 125 MG/2ML
40 INJECTION, POWDER, LYOPHILIZED, FOR SOLUTION INTRAMUSCULAR; INTRAVENOUS EVERY 12 HOURS
Status: DISCONTINUED | OUTPATIENT
Start: 2017-04-28 | End: 2017-04-29

## 2017-04-28 RX ORDER — IPRATROPIUM BROMIDE AND ALBUTEROL SULFATE 2.5; .5 MG/3ML; MG/3ML
3 SOLUTION RESPIRATORY (INHALATION)
Status: DISCONTINUED | OUTPATIENT
Start: 2017-04-28 | End: 2017-05-05 | Stop reason: HOSPADM

## 2017-04-28 RX ADMIN — IPRATROPIUM BROMIDE AND ALBUTEROL SULFATE 3 ML: .5; 3 SOLUTION RESPIRATORY (INHALATION) at 15:21

## 2017-04-28 RX ADMIN — PROPOFOL 150 MG: 10 INJECTION, EMULSION INTRAVENOUS at 10:59

## 2017-04-28 RX ADMIN — METHYLPREDNISOLONE SODIUM SUCCINATE 40 MG: 125 INJECTION, POWDER, FOR SOLUTION INTRAMUSCULAR; INTRAVENOUS at 01:36

## 2017-04-28 RX ADMIN — ACETYLCYSTEINE 3 ML: 200 SOLUTION ORAL; RESPIRATORY (INHALATION) at 19:27

## 2017-04-28 RX ADMIN — IPRATROPIUM BROMIDE AND ALBUTEROL SULFATE 3 ML: .5; 3 SOLUTION RESPIRATORY (INHALATION) at 07:20

## 2017-04-28 RX ADMIN — ENOXAPARIN SODIUM 30 MG: 30 INJECTION SUBCUTANEOUS at 01:36

## 2017-04-28 RX ADMIN — LIDOCAINE HYDROCHLORIDE 50 MG: 20 INJECTION, SOLUTION INFILTRATION; PERINEURAL at 10:59

## 2017-04-28 RX ADMIN — IPRATROPIUM BROMIDE AND ALBUTEROL SULFATE 3 ML: .5; 3 SOLUTION RESPIRATORY (INHALATION) at 03:57

## 2017-04-28 RX ADMIN — CEFTRIAXONE SODIUM 1 G: 1 INJECTION, SOLUTION INTRAVENOUS at 22:42

## 2017-04-28 RX ADMIN — ENOXAPARIN SODIUM 30 MG: 30 INJECTION SUBCUTANEOUS at 20:04

## 2017-04-28 RX ADMIN — METHYLPREDNISOLONE SODIUM SUCCINATE 40 MG: 125 INJECTION, POWDER, FOR SOLUTION INTRAMUSCULAR; INTRAVENOUS at 23:36

## 2017-04-28 RX ADMIN — ACETYLCYSTEINE 3 ML: 200 SOLUTION ORAL; RESPIRATORY (INHALATION) at 15:21

## 2017-04-28 RX ADMIN — METHYLPREDNISOLONE SODIUM SUCCINATE 40 MG: 125 INJECTION, POWDER, FOR SOLUTION INTRAMUSCULAR; INTRAVENOUS at 14:26

## 2017-04-28 RX ADMIN — ROPINIROLE HYDROCHLORIDE 1 MG: 1 TABLET, FILM COATED ORAL at 20:04

## 2017-04-28 RX ADMIN — DOXYCYCLINE 100 MG: 100 INJECTION, POWDER, LYOPHILIZED, FOR SOLUTION INTRAVENOUS at 09:38

## 2017-04-28 RX ADMIN — SODIUM CHLORIDE, POTASSIUM CHLORIDE, SODIUM LACTATE AND CALCIUM CHLORIDE 30 ML/HR: 600; 310; 30; 20 INJECTION, SOLUTION INTRAVENOUS at 10:16

## 2017-04-28 RX ADMIN — IPRATROPIUM BROMIDE AND ALBUTEROL SULFATE 3 ML: .5; 3 SOLUTION RESPIRATORY (INHALATION) at 19:27

## 2017-04-28 RX ADMIN — DOXYCYCLINE 100 MG: 100 INJECTION, POWDER, LYOPHILIZED, FOR SOLUTION INTRAVENOUS at 20:04

## 2017-04-28 RX ADMIN — SODIUM CHLORIDE 125 ML/HR: 9 INJECTION, SOLUTION INTRAVENOUS at 01:06

## 2017-04-28 RX ADMIN — PROPOFOL 140 MCG/KG/MIN: 10 INJECTION, EMULSION INTRAVENOUS at 10:59

## 2017-04-28 NOTE — ANESTHESIA PREPROCEDURE EVALUATION
Anesthesia Evaluation     Patient summary reviewed      Airway   Mallampati: III  TM distance: <3 FB  Neck ROM: limited  no difficulty expected  Dental    (+) poor dentition    Pulmonary    (+) wheezes, rales,   Cardiovascular   Exercise tolerance: poor (<4 METS)    Rhythm: regular        Neuro/Psych  GI/Hepatic/Renal/Endo      Musculoskeletal     Abdominal    Substance History      OB/GYN          Other                                  Anesthesia Plan    ASA 3     MAC     intravenous induction   Anesthetic plan and risks discussed with patient.

## 2017-04-28 NOTE — ANESTHESIA PROCEDURE NOTES
Airway  Date/Time: 4/28/2017 11:02 AM    General Information and Staff    Patient location: endo.  Anesthesiologist: VAL SALAS    Indications and Patient Condition  Indications for airway management: airway protection    Preoxygenated: yes      Final Airway Details  Final airway type: supraglottic airway      Successful airway: classic  Size 3    Number of attempts at approach: 1

## 2017-04-28 NOTE — ANESTHESIA POSTPROCEDURE EVALUATION
Patient: Tony Leonard    Procedure Summary     Date Anesthesia Start Anesthesia Stop Room / Location    04/28/17 1056 1124  [facility] /  [facility]       Procedure Diagnosis Surgeon Provider    BRONCHOSCOPY (N/A Bronchus) COPD exacerbation; Bronchiectasis without complication; Hemoptysis; Pneumonia of left lower lobe due to infectious organism  (COPD exacerbation [J44.1]; Bronchiectasis without complication [J47.9]; Hemoptysis [R04.2]; Pneumonia of left lower lobe due to infectious organism [J18.9]) Katharina Salter MD No responsible provider of record.          Anesthesia Type: MAC  Last vitals  BP      Temp      Pulse     Resp      SpO2        Post Anesthesia Care and Evaluation    Patient location during evaluation: PACU  Patient participation: complete - patient participated  Level of consciousness: awake and alert  Pain score: 0  Pain management: adequate  Airway patency: patent  Anesthetic complications: No anesthetic complications    Cardiovascular status: acceptable  Respiratory status: acceptable  Hydration status: acceptable

## 2017-04-29 LAB
CYTO UR: NORMAL
GLUCOSE BLDC GLUCOMTR-MCNC: 140 MG/DL (ref 70–130)
GLUCOSE BLDC GLUCOMTR-MCNC: 145 MG/DL (ref 70–130)
GLUCOSE BLDC GLUCOMTR-MCNC: 184 MG/DL (ref 70–130)
GLUCOSE BLDC GLUCOMTR-MCNC: 197 MG/DL (ref 70–130)
LAB AP CASE REPORT: NORMAL
LAB AP CLINICAL INFORMATION: NORMAL
Lab: NORMAL
PATH REPORT.FINAL DX SPEC: NORMAL
PATH REPORT.GROSS SPEC: NORMAL

## 2017-04-29 PROCEDURE — 82962 GLUCOSE BLOOD TEST: CPT

## 2017-04-29 PROCEDURE — 94799 UNLISTED PULMONARY SVC/PX: CPT

## 2017-04-29 PROCEDURE — 25010000002 METHYLPREDNISOLONE PER 125 MG: Performed by: INTERNAL MEDICINE

## 2017-04-29 PROCEDURE — 25010000002 ENOXAPARIN PER 10 MG: Performed by: INTERNAL MEDICINE

## 2017-04-29 PROCEDURE — 25010000003 CEFTRIAXONE PER 250 MG: Performed by: INTERNAL MEDICINE

## 2017-04-29 RX ORDER — GUAIFENESIN 600 MG/1
1200 TABLET, EXTENDED RELEASE ORAL 2 TIMES DAILY
Status: DISCONTINUED | OUTPATIENT
Start: 2017-04-29 | End: 2017-05-05 | Stop reason: HOSPADM

## 2017-04-29 RX ORDER — SODIUM CHLORIDE FOR INHALATION 7 %
4 VIAL, NEBULIZER (ML) INHALATION
Status: DISCONTINUED | OUTPATIENT
Start: 2017-04-29 | End: 2017-05-05 | Stop reason: HOSPADM

## 2017-04-29 RX ORDER — PREDNISONE 20 MG/1
40 TABLET ORAL
Status: DISCONTINUED | OUTPATIENT
Start: 2017-04-30 | End: 2017-05-02

## 2017-04-29 RX ADMIN — GUAIFENESIN 1200 MG: 600 TABLET, EXTENDED RELEASE ORAL at 23:56

## 2017-04-29 RX ADMIN — METHYLPREDNISOLONE SODIUM SUCCINATE 40 MG: 125 INJECTION, POWDER, FOR SOLUTION INTRAMUSCULAR; INTRAVENOUS at 11:39

## 2017-04-29 RX ADMIN — DOXYCYCLINE 100 MG: 100 INJECTION, POWDER, LYOPHILIZED, FOR SOLUTION INTRAVENOUS at 20:08

## 2017-04-29 RX ADMIN — IPRATROPIUM BROMIDE AND ALBUTEROL SULFATE 3 ML: .5; 3 SOLUTION RESPIRATORY (INHALATION) at 20:31

## 2017-04-29 RX ADMIN — ACETYLCYSTEINE 3 ML: 200 SOLUTION ORAL; RESPIRATORY (INHALATION) at 15:32

## 2017-04-29 RX ADMIN — IPRATROPIUM BROMIDE AND ALBUTEROL SULFATE 3 ML: .5; 3 SOLUTION RESPIRATORY (INHALATION) at 03:37

## 2017-04-29 RX ADMIN — ROPINIROLE HYDROCHLORIDE 1 MG: 1 TABLET, FILM COATED ORAL at 20:09

## 2017-04-29 RX ADMIN — ENOXAPARIN SODIUM 30 MG: 30 INJECTION SUBCUTANEOUS at 20:08

## 2017-04-29 RX ADMIN — ACETYLCYSTEINE 3 ML: 200 SOLUTION ORAL; RESPIRATORY (INHALATION) at 11:31

## 2017-04-29 RX ADMIN — CEFTRIAXONE SODIUM 1 G: 1 INJECTION, SOLUTION INTRAVENOUS at 23:55

## 2017-04-29 RX ADMIN — IPRATROPIUM BROMIDE AND ALBUTEROL SULFATE 3 ML: .5; 3 SOLUTION RESPIRATORY (INHALATION) at 15:32

## 2017-04-29 RX ADMIN — IPRATROPIUM BROMIDE AND ALBUTEROL SULFATE 3 ML: .5; 3 SOLUTION RESPIRATORY (INHALATION) at 11:26

## 2017-04-29 RX ADMIN — DOXYCYCLINE 100 MG: 100 INJECTION, POWDER, LYOPHILIZED, FOR SOLUTION INTRAVENOUS at 08:15

## 2017-04-29 RX ADMIN — ACETYLCYSTEINE 3 ML: 200 SOLUTION ORAL; RESPIRATORY (INHALATION) at 07:49

## 2017-04-29 RX ADMIN — ACETYLCYSTEINE 3 ML: 200 SOLUTION ORAL; RESPIRATORY (INHALATION) at 20:31

## 2017-04-29 RX ADMIN — IPRATROPIUM BROMIDE AND ALBUTEROL SULFATE 3 ML: .5; 3 SOLUTION RESPIRATORY (INHALATION) at 00:22

## 2017-04-29 RX ADMIN — IPRATROPIUM BROMIDE AND ALBUTEROL SULFATE 3 ML: .5; 3 SOLUTION RESPIRATORY (INHALATION) at 07:49

## 2017-04-30 LAB
BACTERIA SPEC RESP CULT: NORMAL
BACTERIA SPEC RESP CULT: NORMAL
GLUCOSE BLDC GLUCOMTR-MCNC: 111 MG/DL (ref 70–130)
GLUCOSE BLDC GLUCOMTR-MCNC: 122 MG/DL (ref 70–130)
GLUCOSE BLDC GLUCOMTR-MCNC: 129 MG/DL (ref 70–130)
GLUCOSE BLDC GLUCOMTR-MCNC: 138 MG/DL (ref 70–130)
GRAM STN SPEC: NORMAL
GRAM STN SPEC: NORMAL

## 2017-04-30 PROCEDURE — 25010000003 CEFTRIAXONE PER 250 MG: Performed by: INTERNAL MEDICINE

## 2017-04-30 PROCEDURE — 63710000001 PREDNISONE PER 1 MG: Performed by: INTERNAL MEDICINE

## 2017-04-30 PROCEDURE — 94799 UNLISTED PULMONARY SVC/PX: CPT

## 2017-04-30 PROCEDURE — 25010000002 ENOXAPARIN PER 10 MG: Performed by: INTERNAL MEDICINE

## 2017-04-30 PROCEDURE — 82962 GLUCOSE BLOOD TEST: CPT

## 2017-04-30 RX ORDER — PANTOPRAZOLE SODIUM 40 MG/1
40 TABLET, DELAYED RELEASE ORAL
Status: DISCONTINUED | OUTPATIENT
Start: 2017-04-30 | End: 2017-05-05 | Stop reason: HOSPADM

## 2017-04-30 RX ADMIN — ROPINIROLE HYDROCHLORIDE 1 MG: 1 TABLET, FILM COATED ORAL at 20:44

## 2017-04-30 RX ADMIN — SODIUM CHLORIDE SOLN NEBU 7% 4 ML: 7 NEBU SOLN at 23:15

## 2017-04-30 RX ADMIN — IPRATROPIUM BROMIDE AND ALBUTEROL SULFATE 3 ML: .5; 3 SOLUTION RESPIRATORY (INHALATION) at 00:17

## 2017-04-30 RX ADMIN — DOXYCYCLINE 100 MG: 100 INJECTION, POWDER, LYOPHILIZED, FOR SOLUTION INTRAVENOUS at 21:15

## 2017-04-30 RX ADMIN — ENOXAPARIN SODIUM 30 MG: 30 INJECTION SUBCUTANEOUS at 20:45

## 2017-04-30 RX ADMIN — IPRATROPIUM BROMIDE AND ALBUTEROL SULFATE 3 ML: .5; 3 SOLUTION RESPIRATORY (INHALATION) at 08:12

## 2017-04-30 RX ADMIN — DOXYCYCLINE 100 MG: 100 INJECTION, POWDER, LYOPHILIZED, FOR SOLUTION INTRAVENOUS at 08:50

## 2017-04-30 RX ADMIN — SODIUM CHLORIDE SOLN NEBU 7% 4 ML: 7 NEBU SOLN at 08:12

## 2017-04-30 RX ADMIN — IPRATROPIUM BROMIDE AND ALBUTEROL SULFATE 3 ML: .5; 3 SOLUTION RESPIRATORY (INHALATION) at 11:52

## 2017-04-30 RX ADMIN — CEFTRIAXONE SODIUM 1 G: 1 INJECTION, SOLUTION INTRAVENOUS at 20:45

## 2017-04-30 RX ADMIN — PREDNISONE 40 MG: 20 TABLET ORAL at 08:50

## 2017-04-30 RX ADMIN — IPRATROPIUM BROMIDE AND ALBUTEROL SULFATE 3 ML: .5; 3 SOLUTION RESPIRATORY (INHALATION) at 04:15

## 2017-04-30 RX ADMIN — GUAIFENESIN 1200 MG: 600 TABLET, EXTENDED RELEASE ORAL at 08:50

## 2017-04-30 RX ADMIN — GUAIFENESIN 1200 MG: 600 TABLET, EXTENDED RELEASE ORAL at 18:09

## 2017-04-30 RX ADMIN — IPRATROPIUM BROMIDE AND ALBUTEROL SULFATE 3 ML: .5; 3 SOLUTION RESPIRATORY (INHALATION) at 15:46

## 2017-04-30 RX ADMIN — IPRATROPIUM BROMIDE AND ALBUTEROL SULFATE 3 ML: .5; 3 SOLUTION RESPIRATORY (INHALATION) at 19:03

## 2017-04-30 RX ADMIN — IPRATROPIUM BROMIDE AND ALBUTEROL SULFATE 3 ML: .5; 3 SOLUTION RESPIRATORY (INHALATION) at 23:10

## 2017-05-01 LAB
GLUCOSE BLDC GLUCOMTR-MCNC: 106 MG/DL (ref 70–130)
GLUCOSE BLDC GLUCOMTR-MCNC: 131 MG/DL (ref 70–130)
GLUCOSE BLDC GLUCOMTR-MCNC: 96 MG/DL (ref 70–130)
PLATELET # BLD AUTO: 228 10*3/MM3 (ref 140–500)

## 2017-05-01 PROCEDURE — 25010000002 ENOXAPARIN PER 10 MG: Performed by: INTERNAL MEDICINE

## 2017-05-01 PROCEDURE — 94799 UNLISTED PULMONARY SVC/PX: CPT

## 2017-05-01 PROCEDURE — 85049 AUTOMATED PLATELET COUNT: CPT | Performed by: INTERNAL MEDICINE

## 2017-05-01 PROCEDURE — 82962 GLUCOSE BLOOD TEST: CPT

## 2017-05-01 PROCEDURE — 25010000003 CEFTRIAXONE PER 250 MG: Performed by: INTERNAL MEDICINE

## 2017-05-01 PROCEDURE — 63710000001 PREDNISONE PER 1 MG: Performed by: INTERNAL MEDICINE

## 2017-05-01 RX ORDER — DOXYCYCLINE 100 MG/1
100 CAPSULE ORAL EVERY 12 HOURS SCHEDULED
Status: DISCONTINUED | OUTPATIENT
Start: 2017-05-01 | End: 2017-05-05 | Stop reason: HOSPADM

## 2017-05-01 RX ADMIN — PANTOPRAZOLE SODIUM 40 MG: 40 TABLET, DELAYED RELEASE ORAL at 05:49

## 2017-05-01 RX ADMIN — ROPINIROLE HYDROCHLORIDE 1 MG: 1 TABLET, FILM COATED ORAL at 20:17

## 2017-05-01 RX ADMIN — PREDNISONE 40 MG: 20 TABLET ORAL at 08:17

## 2017-05-01 RX ADMIN — IPRATROPIUM BROMIDE AND ALBUTEROL SULFATE 3 ML: .5; 3 SOLUTION RESPIRATORY (INHALATION) at 15:14

## 2017-05-01 RX ADMIN — IPRATROPIUM BROMIDE AND ALBUTEROL SULFATE 3 ML: .5; 3 SOLUTION RESPIRATORY (INHALATION) at 19:50

## 2017-05-01 RX ADMIN — GUAIFENESIN 1200 MG: 600 TABLET, EXTENDED RELEASE ORAL at 08:17

## 2017-05-01 RX ADMIN — DOXYCYCLINE 100 MG: 100 CAPSULE ORAL at 20:17

## 2017-05-01 RX ADMIN — DOXYCYCLINE 100 MG: 100 INJECTION, POWDER, LYOPHILIZED, FOR SOLUTION INTRAVENOUS at 08:17

## 2017-05-01 RX ADMIN — SODIUM CHLORIDE SOLN NEBU 7% 4 ML: 7 NEBU SOLN at 06:47

## 2017-05-01 RX ADMIN — IPRATROPIUM BROMIDE AND ALBUTEROL SULFATE 3 ML: .5; 3 SOLUTION RESPIRATORY (INHALATION) at 06:43

## 2017-05-01 RX ADMIN — IPRATROPIUM BROMIDE AND ALBUTEROL SULFATE 3 ML: .5; 3 SOLUTION RESPIRATORY (INHALATION) at 03:13

## 2017-05-01 RX ADMIN — PANTOPRAZOLE SODIUM 40 MG: 40 TABLET, DELAYED RELEASE ORAL at 00:20

## 2017-05-01 RX ADMIN — GUAIFENESIN 1200 MG: 600 TABLET, EXTENDED RELEASE ORAL at 17:44

## 2017-05-01 RX ADMIN — ENOXAPARIN SODIUM 30 MG: 30 INJECTION SUBCUTANEOUS at 20:17

## 2017-05-01 RX ADMIN — CEFTRIAXONE SODIUM 1 G: 1 INJECTION, SOLUTION INTRAVENOUS at 20:18

## 2017-05-01 RX ADMIN — IPRATROPIUM BROMIDE AND ALBUTEROL SULFATE 3 ML: .5; 3 SOLUTION RESPIRATORY (INHALATION) at 11:02

## 2017-05-01 RX ADMIN — IPRATROPIUM BROMIDE AND ALBUTEROL SULFATE 3 ML: .5; 3 SOLUTION RESPIRATORY (INHALATION) at 23:15

## 2017-05-02 LAB
BACTERIA SPEC AEROBE CULT: NORMAL
BACTERIA SPEC AEROBE CULT: NORMAL

## 2017-05-02 PROCEDURE — 25010000003 CEFTRIAXONE PER 250 MG: Performed by: INTERNAL MEDICINE

## 2017-05-02 PROCEDURE — 94799 UNLISTED PULMONARY SVC/PX: CPT

## 2017-05-02 PROCEDURE — 25010000002 ENOXAPARIN PER 10 MG: Performed by: INTERNAL MEDICINE

## 2017-05-02 PROCEDURE — 63710000001 PREDNISONE PER 1 MG: Performed by: INTERNAL MEDICINE

## 2017-05-02 RX ORDER — PREDNISONE 20 MG/1
20 TABLET ORAL
Status: DISCONTINUED | OUTPATIENT
Start: 2017-05-03 | End: 2017-05-05 | Stop reason: HOSPADM

## 2017-05-02 RX ADMIN — DOXYCYCLINE 100 MG: 100 CAPSULE ORAL at 09:01

## 2017-05-02 RX ADMIN — PANTOPRAZOLE SODIUM 40 MG: 40 TABLET, DELAYED RELEASE ORAL at 05:57

## 2017-05-02 RX ADMIN — ROPINIROLE HYDROCHLORIDE 1 MG: 1 TABLET, FILM COATED ORAL at 20:26

## 2017-05-02 RX ADMIN — IPRATROPIUM BROMIDE AND ALBUTEROL SULFATE 3 ML: .5; 3 SOLUTION RESPIRATORY (INHALATION) at 10:47

## 2017-05-02 RX ADMIN — IPRATROPIUM BROMIDE AND ALBUTEROL SULFATE 3 ML: .5; 3 SOLUTION RESPIRATORY (INHALATION) at 15:00

## 2017-05-02 RX ADMIN — GUAIFENESIN 1200 MG: 600 TABLET, EXTENDED RELEASE ORAL at 09:02

## 2017-05-02 RX ADMIN — DOXYCYCLINE 100 MG: 100 CAPSULE ORAL at 20:26

## 2017-05-02 RX ADMIN — CEFTRIAXONE SODIUM 1 G: 1 INJECTION, SOLUTION INTRAVENOUS at 20:26

## 2017-05-02 RX ADMIN — PREDNISONE 40 MG: 20 TABLET ORAL at 09:01

## 2017-05-02 RX ADMIN — IPRATROPIUM BROMIDE AND ALBUTEROL SULFATE 3 ML: .5; 3 SOLUTION RESPIRATORY (INHALATION) at 19:56

## 2017-05-02 RX ADMIN — ENOXAPARIN SODIUM 30 MG: 30 INJECTION SUBCUTANEOUS at 20:26

## 2017-05-02 RX ADMIN — IPRATROPIUM BROMIDE AND ALBUTEROL SULFATE 3 ML: .5; 3 SOLUTION RESPIRATORY (INHALATION) at 03:00

## 2017-05-02 RX ADMIN — SODIUM CHLORIDE SOLN NEBU 7% 4 ML: 7 NEBU SOLN at 19:56

## 2017-05-02 RX ADMIN — IPRATROPIUM BROMIDE AND ALBUTEROL SULFATE 3 ML: .5; 3 SOLUTION RESPIRATORY (INHALATION) at 23:27

## 2017-05-02 RX ADMIN — IPRATROPIUM BROMIDE AND ALBUTEROL SULFATE 3 ML: .5; 3 SOLUTION RESPIRATORY (INHALATION) at 06:34

## 2017-05-02 RX ADMIN — GUAIFENESIN 1200 MG: 600 TABLET, EXTENDED RELEASE ORAL at 17:18

## 2017-05-03 PROCEDURE — 94799 UNLISTED PULMONARY SVC/PX: CPT

## 2017-05-03 PROCEDURE — 25010000003 CEFTRIAXONE PER 250 MG: Performed by: INTERNAL MEDICINE

## 2017-05-03 PROCEDURE — 63710000001 PREDNISONE PER 1 MG: Performed by: INTERNAL MEDICINE

## 2017-05-03 PROCEDURE — 25010000002 ENOXAPARIN PER 10 MG: Performed by: INTERNAL MEDICINE

## 2017-05-03 RX ADMIN — ENOXAPARIN SODIUM 30 MG: 30 INJECTION SUBCUTANEOUS at 20:53

## 2017-05-03 RX ADMIN — DOXYCYCLINE 100 MG: 100 CAPSULE ORAL at 20:53

## 2017-05-03 RX ADMIN — PREDNISONE 20 MG: 20 TABLET ORAL at 08:14

## 2017-05-03 RX ADMIN — IPRATROPIUM BROMIDE AND ALBUTEROL SULFATE 3 ML: .5; 3 SOLUTION RESPIRATORY (INHALATION) at 06:48

## 2017-05-03 RX ADMIN — DOXYCYCLINE 100 MG: 100 CAPSULE ORAL at 08:14

## 2017-05-03 RX ADMIN — GUAIFENESIN 1200 MG: 600 TABLET, EXTENDED RELEASE ORAL at 08:14

## 2017-05-03 RX ADMIN — IPRATROPIUM BROMIDE AND ALBUTEROL SULFATE 3 ML: .5; 3 SOLUTION RESPIRATORY (INHALATION) at 19:24

## 2017-05-03 RX ADMIN — GUAIFENESIN 1200 MG: 600 TABLET, EXTENDED RELEASE ORAL at 17:51

## 2017-05-03 RX ADMIN — IPRATROPIUM BROMIDE AND ALBUTEROL SULFATE 3 ML: .5; 3 SOLUTION RESPIRATORY (INHALATION) at 23:51

## 2017-05-03 RX ADMIN — SODIUM CHLORIDE SOLN NEBU 7% 4 ML: 7 NEBU SOLN at 19:24

## 2017-05-03 RX ADMIN — IPRATROPIUM BROMIDE AND ALBUTEROL SULFATE 3 ML: .5; 3 SOLUTION RESPIRATORY (INHALATION) at 14:55

## 2017-05-03 RX ADMIN — ROPINIROLE HYDROCHLORIDE 1 MG: 1 TABLET, FILM COATED ORAL at 20:53

## 2017-05-03 RX ADMIN — CEFTRIAXONE SODIUM 1 G: 1 INJECTION, SOLUTION INTRAVENOUS at 20:53

## 2017-05-03 RX ADMIN — PANTOPRAZOLE SODIUM 40 MG: 40 TABLET, DELAYED RELEASE ORAL at 06:12

## 2017-05-03 RX ADMIN — IPRATROPIUM BROMIDE AND ALBUTEROL SULFATE 3 ML: .5; 3 SOLUTION RESPIRATORY (INHALATION) at 03:12

## 2017-05-03 RX ADMIN — SODIUM CHLORIDE SOLN NEBU 7% 4 ML: 7 NEBU SOLN at 06:48

## 2017-05-04 PROCEDURE — 63710000001 PREDNISONE PER 1 MG: Performed by: INTERNAL MEDICINE

## 2017-05-04 PROCEDURE — 94799 UNLISTED PULMONARY SVC/PX: CPT

## 2017-05-04 PROCEDURE — 25010000003 CEFTRIAXONE PER 250 MG: Performed by: INTERNAL MEDICINE

## 2017-05-04 PROCEDURE — 25010000002 ENOXAPARIN PER 10 MG: Performed by: INTERNAL MEDICINE

## 2017-05-04 RX ADMIN — IPRATROPIUM BROMIDE AND ALBUTEROL SULFATE 3 ML: .5; 3 SOLUTION RESPIRATORY (INHALATION) at 11:06

## 2017-05-04 RX ADMIN — IPRATROPIUM BROMIDE AND ALBUTEROL SULFATE 3 ML: .5; 3 SOLUTION RESPIRATORY (INHALATION) at 08:04

## 2017-05-04 RX ADMIN — IPRATROPIUM BROMIDE AND ALBUTEROL SULFATE 3 ML: .5; 3 SOLUTION RESPIRATORY (INHALATION) at 03:40

## 2017-05-04 RX ADMIN — GUAIFENESIN 1200 MG: 600 TABLET, EXTENDED RELEASE ORAL at 18:31

## 2017-05-04 RX ADMIN — GUAIFENESIN 1200 MG: 600 TABLET, EXTENDED RELEASE ORAL at 08:44

## 2017-05-04 RX ADMIN — CEFTRIAXONE SODIUM 1 G: 1 INJECTION, SOLUTION INTRAVENOUS at 20:52

## 2017-05-04 RX ADMIN — DOXYCYCLINE 100 MG: 100 CAPSULE ORAL at 08:44

## 2017-05-04 RX ADMIN — DOXYCYCLINE 100 MG: 100 CAPSULE ORAL at 20:51

## 2017-05-04 RX ADMIN — SODIUM CHLORIDE SOLN NEBU 7% 4 ML: 7 NEBU SOLN at 19:20

## 2017-05-04 RX ADMIN — IPRATROPIUM BROMIDE AND ALBUTEROL SULFATE 3 ML: .5; 3 SOLUTION RESPIRATORY (INHALATION) at 23:56

## 2017-05-04 RX ADMIN — ROPINIROLE HYDROCHLORIDE 1 MG: 1 TABLET, FILM COATED ORAL at 20:51

## 2017-05-04 RX ADMIN — IPRATROPIUM BROMIDE AND ALBUTEROL SULFATE 3 ML: .5; 3 SOLUTION RESPIRATORY (INHALATION) at 14:39

## 2017-05-04 RX ADMIN — ENOXAPARIN SODIUM 30 MG: 30 INJECTION SUBCUTANEOUS at 20:51

## 2017-05-04 RX ADMIN — SODIUM CHLORIDE SOLN NEBU 7% 4 ML: 7 NEBU SOLN at 08:04

## 2017-05-04 RX ADMIN — PANTOPRAZOLE SODIUM 40 MG: 40 TABLET, DELAYED RELEASE ORAL at 06:17

## 2017-05-04 RX ADMIN — PREDNISONE 20 MG: 20 TABLET ORAL at 08:44

## 2017-05-04 RX ADMIN — IPRATROPIUM BROMIDE AND ALBUTEROL SULFATE 3 ML: .5; 3 SOLUTION RESPIRATORY (INHALATION) at 19:19

## 2017-05-05 VITALS
DIASTOLIC BLOOD PRESSURE: 53 MMHG | RESPIRATION RATE: 18 BRPM | WEIGHT: 180 LBS | OXYGEN SATURATION: 93 % | TEMPERATURE: 98.1 F | HEART RATE: 91 BPM | BODY MASS INDEX: 25.2 KG/M2 | HEIGHT: 71 IN | SYSTOLIC BLOOD PRESSURE: 107 MMHG

## 2017-05-05 PROCEDURE — 63710000001 PREDNISONE PER 1 MG: Performed by: INTERNAL MEDICINE

## 2017-05-05 PROCEDURE — 94799 UNLISTED PULMONARY SVC/PX: CPT

## 2017-05-05 RX ORDER — ALBUTEROL SULFATE 2.5 MG/3ML
SOLUTION RESPIRATORY (INHALATION)
Qty: 125 VIAL | Refills: 11 | Status: ON HOLD | OUTPATIENT
Start: 2017-05-05 | End: 2017-06-21

## 2017-05-05 RX ORDER — ALBUTEROL SULFATE 1.25 MG/3ML
1 SOLUTION RESPIRATORY (INHALATION) EVERY 6 HOURS PRN
Qty: 360 ML | Refills: 3 | Status: SHIPPED | OUTPATIENT
Start: 2017-05-05 | End: 2017-06-08

## 2017-05-05 RX ORDER — SODIUM CHLORIDE FOR INHALATION 7 %
4 VIAL, NEBULIZER (ML) INHALATION
Qty: 240 ML | Refills: 1 | Status: SHIPPED | OUTPATIENT
Start: 2017-05-05 | End: 2017-10-19 | Stop reason: HOSPADM

## 2017-05-05 RX ADMIN — PANTOPRAZOLE SODIUM 40 MG: 40 TABLET, DELAYED RELEASE ORAL at 06:55

## 2017-05-05 RX ADMIN — IPRATROPIUM BROMIDE AND ALBUTEROL SULFATE 3 ML: .5; 3 SOLUTION RESPIRATORY (INHALATION) at 10:47

## 2017-05-05 RX ADMIN — SODIUM CHLORIDE SOLN NEBU 7% 4 ML: 7 NEBU SOLN at 08:06

## 2017-05-05 RX ADMIN — GUAIFENESIN 1200 MG: 600 TABLET, EXTENDED RELEASE ORAL at 08:45

## 2017-05-05 RX ADMIN — GUAIFENESIN 1200 MG: 600 TABLET, EXTENDED RELEASE ORAL at 18:23

## 2017-05-05 RX ADMIN — PREDNISONE 20 MG: 20 TABLET ORAL at 08:45

## 2017-05-05 RX ADMIN — IPRATROPIUM BROMIDE AND ALBUTEROL SULFATE 3 ML: .5; 3 SOLUTION RESPIRATORY (INHALATION) at 08:03

## 2017-05-05 RX ADMIN — IPRATROPIUM BROMIDE AND ALBUTEROL SULFATE 3 ML: .5; 3 SOLUTION RESPIRATORY (INHALATION) at 15:14

## 2017-05-05 RX ADMIN — IPRATROPIUM BROMIDE AND ALBUTEROL SULFATE 3 ML: .5; 3 SOLUTION RESPIRATORY (INHALATION) at 03:27

## 2017-05-05 RX ADMIN — DOXYCYCLINE 100 MG: 100 CAPSULE ORAL at 08:45

## 2017-05-26 LAB — FUNGUS WND CULT: NORMAL

## 2017-05-31 ENCOUNTER — APPOINTMENT (OUTPATIENT)
Dept: GENERAL RADIOLOGY | Facility: HOSPITAL | Age: 77
End: 2017-05-31

## 2017-05-31 ENCOUNTER — APPOINTMENT (OUTPATIENT)
Dept: CT IMAGING | Facility: HOSPITAL | Age: 77
End: 2017-05-31

## 2017-05-31 ENCOUNTER — HOSPITAL ENCOUNTER (OUTPATIENT)
Facility: HOSPITAL | Age: 77
Setting detail: OBSERVATION
LOS: 1 days | Discharge: HOME | End: 2017-06-03
Attending: EMERGENCY MEDICINE | Admitting: INTERNAL MEDICINE

## 2017-05-31 DIAGNOSIS — R50.9 FEBRILE ILLNESS: ICD-10-CM

## 2017-05-31 DIAGNOSIS — J44.1 COPD EXACERBATION (HCC): ICD-10-CM

## 2017-05-31 DIAGNOSIS — J40 BRONCHITIS: Primary | ICD-10-CM

## 2017-05-31 LAB
ALBUMIN SERPL-MCNC: 3.8 G/DL (ref 3.5–5.2)
ALBUMIN/GLOB SERPL: 1.3 G/DL
ALP SERPL-CCNC: 77 U/L (ref 39–117)
ALT SERPL W P-5'-P-CCNC: 15 U/L (ref 1–41)
ANION GAP SERPL CALCULATED.3IONS-SCNC: 15.2 MMOL/L
AST SERPL-CCNC: 17 U/L (ref 1–40)
B PERT DNA SPEC QL NAA+PROBE: NOT DETECTED
BASOPHILS # BLD AUTO: 0.03 10*3/MM3 (ref 0–0.2)
BASOPHILS NFR BLD AUTO: 0.2 % (ref 0–1.5)
BILIRUB SERPL-MCNC: 0.5 MG/DL (ref 0.1–1.2)
BUN BLD-MCNC: 20 MG/DL (ref 8–23)
BUN/CREAT SERPL: 18.3 (ref 7–25)
C PNEUM DNA NPH QL NAA+NON-PROBE: NOT DETECTED
CALCIUM SPEC-SCNC: 9.1 MG/DL (ref 8.6–10.5)
CHLORIDE SERPL-SCNC: 99 MMOL/L (ref 98–107)
CO2 SERPL-SCNC: 24.8 MMOL/L (ref 22–29)
CREAT BLD-MCNC: 1.09 MG/DL (ref 0.76–1.27)
D-LACTATE SERPL-SCNC: 1.3 MMOL/L (ref 0.5–2)
DEPRECATED RDW RBC AUTO: 48.5 FL (ref 37–54)
EOSINOPHIL # BLD AUTO: 0.66 10*3/MM3 (ref 0–0.7)
EOSINOPHIL NFR BLD AUTO: 3.8 % (ref 0.3–6.2)
ERYTHROCYTE [DISTWIDTH] IN BLOOD BY AUTOMATED COUNT: 14.1 % (ref 11.5–14.5)
FLUAV H1 2009 PAND RNA NPH QL NAA+PROBE: NOT DETECTED
FLUAV H1 HA GENE NPH QL NAA+PROBE: NOT DETECTED
FLUAV H3 RNA NPH QL NAA+PROBE: NOT DETECTED
FLUAV SUBTYP SPEC NAA+PROBE: NOT DETECTED
FLUBV RNA ISLT QL NAA+PROBE: NOT DETECTED
GFR SERPL CREATININE-BSD FRML MDRD: 65 ML/MIN/1.73
GLOBULIN UR ELPH-MCNC: 3 GM/DL
GLUCOSE BLD-MCNC: 96 MG/DL (ref 65–99)
HADV DNA SPEC NAA+PROBE: NOT DETECTED
HCOV 229E RNA SPEC QL NAA+PROBE: NOT DETECTED
HCOV HKU1 RNA SPEC QL NAA+PROBE: NOT DETECTED
HCOV NL63 RNA SPEC QL NAA+PROBE: NOT DETECTED
HCOV OC43 RNA SPEC QL NAA+PROBE: NOT DETECTED
HCT VFR BLD AUTO: 42.9 % (ref 40.4–52.2)
HGB BLD-MCNC: 14.4 G/DL (ref 13.7–17.6)
HMPV RNA NPH QL NAA+NON-PROBE: NOT DETECTED
HOLD SPECIMEN: NORMAL
HOLD SPECIMEN: NORMAL
HPIV1 RNA SPEC QL NAA+PROBE: NOT DETECTED
HPIV2 RNA SPEC QL NAA+PROBE: NOT DETECTED
HPIV3 RNA NPH QL NAA+PROBE: NOT DETECTED
HPIV4 P GENE NPH QL NAA+PROBE: NOT DETECTED
IMM GRANULOCYTES # BLD: 0.03 10*3/MM3 (ref 0–0.03)
IMM GRANULOCYTES NFR BLD: 0.2 % (ref 0–0.5)
INR PPP: 1.09 (ref 0.9–1.1)
LYMPHOCYTES # BLD AUTO: 1.36 10*3/MM3 (ref 0.9–4.8)
LYMPHOCYTES NFR BLD AUTO: 7.8 % (ref 19.6–45.3)
M PNEUMO IGG SER IA-ACNC: NOT DETECTED
MCH RBC QN AUTO: 31.9 PG (ref 27–32.7)
MCHC RBC AUTO-ENTMCNC: 33.6 G/DL (ref 32.6–36.4)
MCV RBC AUTO: 95.1 FL (ref 79.8–96.2)
MONOCYTES # BLD AUTO: 1.2 10*3/MM3 (ref 0.2–1.2)
MONOCYTES NFR BLD AUTO: 6.9 % (ref 5–12)
NEUTROPHILS # BLD AUTO: 14.22 10*3/MM3 (ref 1.9–8.1)
NEUTROPHILS NFR BLD AUTO: 81.1 % (ref 42.7–76)
NT-PROBNP SERPL-MCNC: 123 PG/ML (ref 0–1800)
PLATELET # BLD AUTO: 270 10*3/MM3 (ref 140–500)
PMV BLD AUTO: 9.6 FL (ref 6–12)
POTASSIUM BLD-SCNC: 3.8 MMOL/L (ref 3.5–5.2)
PROT SERPL-MCNC: 6.8 G/DL (ref 6–8.5)
PROTHROMBIN TIME: 13.7 SECONDS (ref 11.7–14.2)
RBC # BLD AUTO: 4.51 10*6/MM3 (ref 4.6–6)
RHINOVIRUS RNA SPEC NAA+PROBE: NOT DETECTED
RSV RNA NPH QL NAA+NON-PROBE: NOT DETECTED
SODIUM BLD-SCNC: 139 MMOL/L (ref 136–145)
TROPONIN T SERPL-MCNC: <0.01 NG/ML (ref 0–0.03)
WBC NRBC COR # BLD: 17.5 10*3/MM3 (ref 4.5–10.7)
WHOLE BLOOD HOLD SPECIMEN: NORMAL

## 2017-05-31 PROCEDURE — 87633 RESP VIRUS 12-25 TARGETS: CPT | Performed by: EMERGENCY MEDICINE

## 2017-05-31 PROCEDURE — 80053 COMPREHEN METABOLIC PANEL: CPT | Performed by: EMERGENCY MEDICINE

## 2017-05-31 PROCEDURE — 84484 ASSAY OF TROPONIN QUANT: CPT | Performed by: EMERGENCY MEDICINE

## 2017-05-31 PROCEDURE — 85610 PROTHROMBIN TIME: CPT | Performed by: EMERGENCY MEDICINE

## 2017-05-31 PROCEDURE — 87798 DETECT AGENT NOS DNA AMP: CPT | Performed by: EMERGENCY MEDICINE

## 2017-05-31 PROCEDURE — 87040 BLOOD CULTURE FOR BACTERIA: CPT | Performed by: EMERGENCY MEDICINE

## 2017-05-31 PROCEDURE — 93010 ELECTROCARDIOGRAM REPORT: CPT | Performed by: INTERNAL MEDICINE

## 2017-05-31 PROCEDURE — 99284 EMERGENCY DEPT VISIT MOD MDM: CPT

## 2017-05-31 PROCEDURE — 81003 URINALYSIS AUTO W/O SCOPE: CPT | Performed by: EMERGENCY MEDICINE

## 2017-05-31 PROCEDURE — 87486 CHLMYD PNEUM DNA AMP PROBE: CPT | Performed by: EMERGENCY MEDICINE

## 2017-05-31 PROCEDURE — 94799 UNLISTED PULMONARY SVC/PX: CPT

## 2017-05-31 PROCEDURE — 83605 ASSAY OF LACTIC ACID: CPT | Performed by: EMERGENCY MEDICINE

## 2017-05-31 PROCEDURE — 87581 M.PNEUMON DNA AMP PROBE: CPT | Performed by: EMERGENCY MEDICINE

## 2017-05-31 PROCEDURE — 85025 COMPLETE CBC W/AUTO DIFF WBC: CPT | Performed by: EMERGENCY MEDICINE

## 2017-05-31 PROCEDURE — 71020 HC CHEST PA AND LATERAL: CPT

## 2017-05-31 PROCEDURE — 71250 CT THORAX DX C-: CPT

## 2017-05-31 PROCEDURE — 25010000002 METHYLPREDNISOLONE PER 125 MG: Performed by: EMERGENCY MEDICINE

## 2017-05-31 PROCEDURE — 83880 ASSAY OF NATRIURETIC PEPTIDE: CPT | Performed by: EMERGENCY MEDICINE

## 2017-05-31 PROCEDURE — 93005 ELECTROCARDIOGRAM TRACING: CPT | Performed by: EMERGENCY MEDICINE

## 2017-05-31 PROCEDURE — 94640 AIRWAY INHALATION TREATMENT: CPT

## 2017-05-31 RX ORDER — METHYLPREDNISOLONE SODIUM SUCCINATE 125 MG/2ML
125 INJECTION, POWDER, LYOPHILIZED, FOR SOLUTION INTRAMUSCULAR; INTRAVENOUS ONCE
Status: COMPLETED | OUTPATIENT
Start: 2017-05-31 | End: 2017-05-31

## 2017-05-31 RX ORDER — IPRATROPIUM BROMIDE AND ALBUTEROL SULFATE 2.5; .5 MG/3ML; MG/3ML
3 SOLUTION RESPIRATORY (INHALATION) ONCE
Status: COMPLETED | OUTPATIENT
Start: 2017-05-31 | End: 2017-05-31

## 2017-05-31 RX ORDER — SODIUM CHLORIDE 0.9 % (FLUSH) 0.9 %
10 SYRINGE (ML) INJECTION AS NEEDED
Status: DISCONTINUED | OUTPATIENT
Start: 2017-05-31 | End: 2017-06-02

## 2017-05-31 RX ORDER — ACETAMINOPHEN 500 MG
1000 TABLET ORAL ONCE
Status: COMPLETED | OUTPATIENT
Start: 2017-05-31 | End: 2017-05-31

## 2017-05-31 RX ADMIN — METHYLPREDNISOLONE SODIUM SUCCINATE 125 MG: 125 INJECTION, POWDER, FOR SOLUTION INTRAMUSCULAR; INTRAVENOUS at 20:38

## 2017-05-31 RX ADMIN — SODIUM CHLORIDE 500 ML: 9 INJECTION, SOLUTION INTRAVENOUS at 20:38

## 2017-05-31 RX ADMIN — IPRATROPIUM BROMIDE AND ALBUTEROL SULFATE 3 ML: .5; 3 SOLUTION RESPIRATORY (INHALATION) at 20:49

## 2017-05-31 RX ADMIN — ACETAMINOPHEN 1000 MG: 500 TABLET ORAL at 20:37

## 2017-06-01 PROBLEM — J40 BRONCHITIS: Status: ACTIVE | Noted: 2017-06-01

## 2017-06-01 LAB
BILIRUB UR QL STRIP: NEGATIVE
CLARITY UR: CLEAR
COLOR UR: YELLOW
GLUCOSE UR STRIP-MCNC: NEGATIVE MG/DL
HGB UR QL STRIP.AUTO: NEGATIVE
KETONES UR QL STRIP: ABNORMAL
LEUKOCYTE ESTERASE UR QL STRIP.AUTO: NEGATIVE
NITRITE UR QL STRIP: NEGATIVE
PH UR STRIP.AUTO: <=5 [PH] (ref 5–8)
PROT UR QL STRIP: NEGATIVE
SP GR UR STRIP: 1.02 (ref 1–1.03)
UROBILINOGEN UR QL STRIP: ABNORMAL

## 2017-06-01 PROCEDURE — G0378 HOSPITAL OBSERVATION PER HR: HCPCS

## 2017-06-01 PROCEDURE — 96372 THER/PROPH/DIAG INJ SC/IM: CPT

## 2017-06-01 PROCEDURE — 96375 TX/PRO/DX INJ NEW DRUG ADDON: CPT

## 2017-06-01 PROCEDURE — 94799 UNLISTED PULMONARY SVC/PX: CPT

## 2017-06-01 PROCEDURE — 96365 THER/PROPH/DIAG IV INF INIT: CPT

## 2017-06-01 PROCEDURE — 25010000002 LEVOFLOXACIN PER 250 MG: Performed by: EMERGENCY MEDICINE

## 2017-06-01 PROCEDURE — 25010000002 ENOXAPARIN PER 10 MG: Performed by: INTERNAL MEDICINE

## 2017-06-01 PROCEDURE — 63710000001 PREDNISONE PER 1 MG: Performed by: INTERNAL MEDICINE

## 2017-06-01 RX ORDER — ROPINIROLE 1 MG/1
1 TABLET, FILM COATED ORAL NIGHTLY
Status: DISCONTINUED | OUTPATIENT
Start: 2017-06-01 | End: 2017-06-01

## 2017-06-01 RX ORDER — SENNA AND DOCUSATE SODIUM 50; 8.6 MG/1; MG/1
2 TABLET, FILM COATED ORAL 2 TIMES DAILY PRN
Status: DISCONTINUED | OUTPATIENT
Start: 2017-06-01 | End: 2017-06-03 | Stop reason: HOSPADM

## 2017-06-01 RX ORDER — ROPINIROLE 1 MG/1
1 TABLET, FILM COATED ORAL NIGHTLY
Status: DISCONTINUED | OUTPATIENT
Start: 2017-06-01 | End: 2017-06-03 | Stop reason: HOSPADM

## 2017-06-01 RX ORDER — TRAMADOL HYDROCHLORIDE 50 MG/1
50 TABLET ORAL EVERY 6 HOURS PRN
Status: DISCONTINUED | OUTPATIENT
Start: 2017-06-01 | End: 2017-06-03 | Stop reason: HOSPADM

## 2017-06-01 RX ORDER — LEVOFLOXACIN 5 MG/ML
750 INJECTION, SOLUTION INTRAVENOUS ONCE
Status: COMPLETED | OUTPATIENT
Start: 2017-06-01 | End: 2017-06-01

## 2017-06-01 RX ORDER — NALOXONE HCL 0.4 MG/ML
0.4 VIAL (ML) INJECTION
Status: DISCONTINUED | OUTPATIENT
Start: 2017-06-01 | End: 2017-06-03 | Stop reason: HOSPADM

## 2017-06-01 RX ORDER — NITROGLYCERIN 0.4 MG/1
0.4 TABLET SUBLINGUAL
Status: DISCONTINUED | OUTPATIENT
Start: 2017-06-01 | End: 2017-06-03 | Stop reason: HOSPADM

## 2017-06-01 RX ORDER — SODIUM CHLORIDE 0.9 % (FLUSH) 0.9 %
1-10 SYRINGE (ML) INJECTION AS NEEDED
Status: DISCONTINUED | OUTPATIENT
Start: 2017-06-01 | End: 2017-06-03 | Stop reason: HOSPADM

## 2017-06-01 RX ORDER — ACETAMINOPHEN 325 MG/1
650 TABLET ORAL EVERY 4 HOURS PRN
Status: DISCONTINUED | OUTPATIENT
Start: 2017-06-01 | End: 2017-06-03 | Stop reason: HOSPADM

## 2017-06-01 RX ORDER — IPRATROPIUM BROMIDE AND ALBUTEROL SULFATE 2.5; .5 MG/3ML; MG/3ML
3 SOLUTION RESPIRATORY (INHALATION)
Status: DISCONTINUED | OUTPATIENT
Start: 2017-06-01 | End: 2017-06-03 | Stop reason: HOSPADM

## 2017-06-01 RX ORDER — PREDNISONE 20 MG/1
40 TABLET ORAL
Status: DISCONTINUED | OUTPATIENT
Start: 2017-06-01 | End: 2017-06-03 | Stop reason: HOSPADM

## 2017-06-01 RX ORDER — MORPHINE SULFATE 2 MG/ML
1 INJECTION, SOLUTION INTRAMUSCULAR; INTRAVENOUS EVERY 4 HOURS PRN
Status: DISCONTINUED | OUTPATIENT
Start: 2017-06-01 | End: 2017-06-03 | Stop reason: HOSPADM

## 2017-06-01 RX ADMIN — ROPINIROLE HYDROCHLORIDE 1 MG: 1 TABLET, FILM COATED ORAL at 23:08

## 2017-06-01 RX ADMIN — IPRATROPIUM BROMIDE AND ALBUTEROL SULFATE 3 ML: .5; 3 SOLUTION RESPIRATORY (INHALATION) at 11:09

## 2017-06-01 RX ADMIN — ROPINIROLE HYDROCHLORIDE 1 MG: 1 TABLET, FILM COATED ORAL at 04:29

## 2017-06-01 RX ADMIN — IPRATROPIUM BROMIDE AND ALBUTEROL SULFATE 3 ML: .5; 3 SOLUTION RESPIRATORY (INHALATION) at 18:59

## 2017-06-01 RX ADMIN — PREDNISONE 40 MG: 20 TABLET ORAL at 08:46

## 2017-06-01 RX ADMIN — LEVOFLOXACIN 750 MG: 5 INJECTION, SOLUTION INTRAVENOUS at 00:31

## 2017-06-01 RX ADMIN — ENOXAPARIN SODIUM 30 MG: 30 INJECTION SUBCUTANEOUS at 08:46

## 2017-06-01 RX ADMIN — IPRATROPIUM BROMIDE AND ALBUTEROL SULFATE 3 ML: .5; 3 SOLUTION RESPIRATORY (INHALATION) at 07:25

## 2017-06-01 NOTE — PLAN OF CARE
Problem: Patient Care Overview (Adult)  Goal: Plan of Care Review  Outcome: Ongoing (interventions implemented as appropriate)    06/01/17 1537   Coping/Psychosocial Response Interventions   Plan Of Care Reviewed With patient   Patient Care Overview   Progress improving   Outcome Evaluation   Outcome Summary/Follow up Plan pt transfered to telemetry this morning. pt lungs sound better and his coughing has decreased. still on 2L of O2, just as at home. up with assist x 1. will continue to monitor.       Goal: Adult Individualization and Mutuality  Outcome: Ongoing (interventions implemented as appropriate)  Goal: Discharge Needs Assessment  Outcome: Ongoing (interventions implemented as appropriate)    Problem: Fall Risk (Adult)  Goal: Absence of Falls  Outcome: Ongoing (interventions implemented as appropriate)    Problem: Respiratory Insufficiency (Adult)  Goal: Acid/Base Balance  Outcome: Ongoing (interventions implemented as appropriate)  Goal: Effective Ventilation  Outcome: Ongoing (interventions implemented as appropriate)

## 2017-06-01 NOTE — H&P
[facility]    HISTORY AND PHYSICAL ADMISSION NOTE    CC: Shortness of breath     HPI:   77-year-old male patient who presents with shortness of breath which began earlier in the evening.  It is still present.  It happened after he returned from being outdoors.  He does not know what could've triggered it other than some mild exertion.  It is now alleviated after some oxygen in the emergency room.  He usually uses 2 L of oxygen at home and he has diagnosis of COPD.  He says he suddenly felt that he was shaking and had some tremors and muscle aches.  He's also been having a cough with supposedly some sputum over the past couple of days.  The symptoms are persistent in spite of receiving some steroids and oxygen and bronchodilators in the emergency room.  ER physician feels that's necessary to admit him to the hospital.  I discussed the case with Dr. Montesinos.    Patient usually sees Dr. Salter in our office.  I reviewed discharge summary dictated by Dr. Sanabria on May 5, 2017.  The patient was just in the hospital for hemoptysis, exacerbation of bronchiectasis, acute kidney injury and nocturnal hypoxemia.    Review of Systems   Constitutional: Positive for chills and fatigue. Negative for diaphoresis and fever.   HENT: Negative for ear pain and sore throat.    Eyes: Negative for pain and visual disturbance.   Respiratory: Positive for cough and shortness of breath.    Gastrointestinal: Negative for abdominal pain and diarrhea.   Endocrine: Negative for cold intolerance and polyuria.   Genitourinary: Negative for dysuria and hematuria.   Musculoskeletal: Positive for myalgias. Negative for joint swelling.   Skin: Negative for rash and wound.   Neurological: Positive for dizziness, tremors and weakness. Negative for speech difficulty and numbness.   Hematological: Negative for adenopathy. Does not bruise/bleed easily.   Psychiatric/Behavioral: Negative for agitation and confusion.       Family History    Problem Relation Age of Onset   • Thyroid disease Mother    • No Known Problems Father        Social History     Social History   • Marital status: Single     Spouse name: N/A   • Number of children: N/A   • Years of education: N/A     Social History Main Topics   • Smoking status: Never Smoker   • Smokeless tobacco: Never Used   • Alcohol use No   • Drug use: No   • Sexual activity: Defer     Other Topics Concern   • None     Social History Narrative       Past Medical History:   Diagnosis Date   • ADHD (attention deficit hyperactivity disorder)    • Colon polyp    • COPD (chronic obstructive pulmonary disease)    • Diverticulosis    • On home oxygen therapy    • Pneumonia        Past Surgical History:   Procedure Laterality Date   • BRONCHOSCOPY N/A 4/30/2016    Procedure: BRONCHOSCOPY with BAL of right lower lobe and left  lower lobe.  ;  Surgeon: Nando Diaz MD;  Location:  [facility];  Service:    • BRONCHOSCOPY N/A 4/28/2017    Procedure: BRONCHOSCOPY;  Surgeon: Katharina Salter MD;  [facility];  Service:    • COLONOSCOPY  05/17/2013    eh, ih, tort, sig tics   • ENDOSCOPY  03/19/2015    z line irreg, hh   • HERNIA REPAIR     • SPINE SURGERY     • TONSILLECTOMY         Prior to Admission Medications     Prescriptions Last Dose Informant Patient Reported? Taking?    albuterol (ACCUNEB) 1.25 MG/3ML nebulizer solution   No Yes    Take 3 mL by nebulization Every 6 (Six) Hours As Needed for Wheezing.    albuterol (PROVENTIL HFA;VENTOLIN HFA) 108 (90 BASE) MCG/ACT inhaler   Yes Yes    Inhale 2 puffs Every 4 (Four) Hours As Needed for Wheezing.    albuterol (PROVENTIL) (2.5 MG/3ML) 0.083% nebulizer solution   No Yes    Use with nebulizer twice a day with aerobika device and 7% saline    clotrimazole-betamethasone (LOTRISONE) 1-0.05 % cream   No Yes    Apply  topically 2 (Two) Times a Day.    cyclobenzaprine (FLEXERIL) 10 MG tablet   No Yes    Take 1 tablet by mouth 3 (three) times a day  as needed for muscle spasms. Do not drive    dextromethorphan-guaifenesin (ROBITUSSIN-DM)  MG/5ML syrup   Yes No    Take 5 mL by mouth Every 12 (Twelve) Hours.    esomeprazole (NexIUM) 40 MG capsule   No Yes    Take 1 capsule by mouth every morning before breakfast.    fexofenadine (ALLEGRA) 60 MG tablet   Yes Yes    Take 60 mg by mouth Daily As Needed.    FLUoxetine (PROzac) 60 MG tablet   No No    Take 1 tablet by mouth Daily.    glucosamine-chondroitin 500-400 MG capsule capsule   Yes No    Take  by mouth 3 (Three) Times a Day With Meals.    guaiFENesin (MUCINEX) 600 MG 12 hr tablet   No Yes    Take 2 tablets by mouth 2 (Two) Times a Day.    methylphenidate (RITALIN) 10 MG tablet   No Yes    Take 1 tablet by mouth 3 (Three) Times a Day With Meals.    Misc Natural Products (NF FORMULAS TESTOSTERONE PO)   Yes No    Take  by mouth Daily.    Multiple Vitamins-Minerals (MULTIVITAMIN ADULT PO)   Yes Yes    Take  by mouth Daily.    rOPINIRole (REQUIP) 1 MG tablet   Yes Yes    Take 1 mg by mouth Every Night. Take 1 hour before bedtime.    sodium chloride 7 % nebulizer solution nebulizer solution   No Yes    Take 4 mL by nebulization 2 (Two) Times a Day.    Umeclidinium-Vilanterol (ANORO ELLIPTA) 62.5-25 MCG/INH aerosol powder    No Yes    Inhale 62.5 mcg Daily.          Azithromycin; Latex; and Sulfa antibiotics    Temp:  [100.8 °F (38.2 °C)-101.7 °F (38.7 °C)] 100.8 °F (38.2 °C)  Heart Rate:  [] 100  Resp:  [16-22] 18  BP: (124-145)/(61-81) 127/70    Physical Exam   Constitutional: He is oriented to person, place, and time. He appears well-developed. No distress.   HENT:   Head: Normocephalic.   Nose: Nose normal.   Mouth/Throat: No oropharyngeal exudate.   Eyes: Conjunctivae and EOM are normal. No scleral icterus.   Neck: No tracheal deviation present. No thyromegaly present.   Cardiovascular: Normal rate, regular rhythm and normal heart sounds.    No murmur heard.  2+ edema of both legs above the knees    Pulmonary/Chest: No respiratory distress. He has no wheezes.   Scattered rhonchi but no wheezing at this time   Abdominal: He exhibits no distension and no mass. There is no guarding.   Musculoskeletal: Normal range of motion. He exhibits no deformity.   Neurological: He is alert and oriented to person, place, and time. No cranial nerve deficit. He exhibits normal muscle tone.   Skin: Skin is warm. No rash noted. He is not diaphoretic. No erythema.   Psychiatric: He has a normal mood and affect. His behavior is normal.   Nursing note and vitals reviewed.      LABS and IMAGING DATA:    Lab Results (last 24 hours)     Procedure Component Value Units Date/Time    Blood Culture [[id number]] Collected:  05/31/17 2036    Specimen:  Blood from Arm, Left Updated:  05/31/17 2036    CBC & Differential [[id number]] Collected:  05/31/17 2026    Specimen:  Blood Updated:  05/31/17 2041    Narrative:       The following orders were created for panel order CBC & Differential.  Procedure                               Abnormality         Status                     ---------                               -----------         ------                     CBC Auto Differential[[id number]]         Abnormal            Final result                 Please view results for these tests on the individual orders.    CBC Auto Differential [[id number]]  (Abnormal) Collected:  05/31/17 2026    Specimen:  Blood Updated:  05/31/17 2041     WBC 17.50 (H) 10*3/mm3      RBC 4.51 (L) 10*6/mm3      Hemoglobin 14.4 g/dL      Hematocrit 42.9 %      MCV 95.1 fL      MCH 31.9 pg      MCHC 33.6 g/dL      RDW 14.1 %      RDW-SD 48.5 fl      MPV 9.6 fL      Platelets 270 10*3/mm3      Neutrophil % 81.1 (H) %      Lymphocyte % 7.8 (L) %      Monocyte % 6.9 %      Eosinophil % 3.8 %      Basophil % 0.2 %      Immature Grans % 0.2 %      Neutrophils, Absolute 14.22 (H) 10*3/mm3      Lymphocytes, Absolute 1.36 10*3/mm3      Monocytes, Absolute 1.20 10*3/mm3       Eosinophils, Absolute 0.66 10*3/mm3      Basophils, Absolute 0.03 10*3/mm3      Immature Grans, Absolute 0.03 10*3/mm3     Protime-INR [[id number]]  (Normal) Collected:  05/31/17 2026    Specimen:  Blood Updated:  05/31/17 2050     Protime 13.7 Seconds      INR 1.09    Lactic Acid, Plasma [[id number]]  (Normal) Collected:  05/31/17 2026    Specimen:  Blood Updated:  05/31/17 2102     Lactate 1.3 mmol/L     Comprehensive Metabolic Panel [[id number]] Collected:  05/31/17 2026    Specimen:  Blood Updated:  05/31/17 2105     Glucose 96 mg/dL      BUN 20 mg/dL      Creatinine 1.09 mg/dL      Sodium 139 mmol/L      Potassium 3.8 mmol/L      Chloride 99 mmol/L      CO2 24.8 mmol/L      Calcium 9.1 mg/dL      Total Protein 6.8 g/dL      Albumin 3.80 g/dL      ALT (SGPT) 15 U/L      AST (SGOT) 17 U/L      Alkaline Phosphatase 77 U/L      Total Bilirubin 0.5 mg/dL      eGFR Non African Amer 65 mL/min/1.73      Globulin 3.0 gm/dL      A/G Ratio 1.3 g/dL      BUN/Creatinine Ratio 18.3     Anion Gap 15.2 mmol/L     Narrative:       The MDRD GFR formula is only valid for adults with stable renal function between ages 18 and 70.    Troponin [[id number]]  (Normal) Collected:  05/31/17 2026    Specimen:  Blood Updated:  05/31/17 2108     Troponin T <0.010 ng/mL     Narrative:       Troponin T Reference Ranges:  Less than 0.03 ng/mL:    Negative for AMI  0.03 to 0.09 ng/mL:      Indeterminant for AMI  Greater than 0.09 ng/mL: Positive for AMI    BNP [[id number]]  (Normal) Collected:  05/31/17 2026    Specimen:  Blood Updated:  05/31/17 2108     proBNP 123.0 pg/mL     Narrative:       Among patients with dyspnea, NT-proBNP is highly sensitive for the detection of acute congestive heart failure. In addition NT-proBNP of <300 pg/ml effectively rules out acute congestive heart failure with 99% negative predictive value.    Blood Culture [[id number]] Collected:  05/31/17 2105    Specimen:  Blood from Arm, Right Updated:  05/31/17 2108    [place]  Draw [[id number]] Collected:  05/31/17 2026    Specimen:  Blood Updated:  05/31/17 2201    Narrative:       The following orders were created for panel order [place] Draw.  Procedure                               Abnormality         Status                     ---------                               -----------         ------                     Light Blue Top[[id number]]                                                               Green Top (Gel)[[id number]]                                   Final result               Lavender Top[[id number]]                                      Final result               Gold Top - SST[[id number]]                                    Final result                 Please view results for these tests on the individual orders.    Green Top (Gel) [[id number]] Collected:  05/31/17 2026    Specimen:  Blood Updated:  05/31/17 2201     Extra Tube Hold for add-ons.      Auto resulted.       Lavender Top [[id number]] Collected:  05/31/17 2026    Specimen:  Blood Updated:  05/31/17 2201     Extra Tube hold for add-on      Auto resulted       Gold Top - SST [[id number]] Collected:  05/31/17 2026    Specimen:  Blood Updated:  05/31/17 2201     Extra Tube Hold for add-ons.      Auto resulted.       Respiratory Panel, PCR [[id number]]  (Normal) Collected:  05/31/17 2030    Specimen:  Swab from Nares Updated:  05/31/17 2347     ADENOVIRUS, PCR Not Detected     Coronavirus 229E Not Detected     Coronavirus HKU1 Not Detected     Coronavirus NL63 Not Detected     Coronavirus OC43 Not Detected     Human Metapneumovirus Not Detected     Human Rhinovirus/Enterovirus Not Detected     Influenza B PCR Not Detected     Parainfluenza Virus 1 Not Detected     Parainfluenza Virus 2 Not Detected     Parainfluenza Virus 3 Not Detected     Parainfluenza Virus 4 Not Detected     Bordetella pertussis pcr Not Detected     Influenza 2009 H1N1 by PCR Not Detected     Chlamydophila pneumoniae PCR Not Detected     Mycoplasma pneumo by PCR  Not Detected     Influenza A PCR Not Detected     Influenza A H3 Not Detected     Influenza A H1 Not Detected     RSV, PCR Not Detected    Urinalysis With / Culture If Indicated [[id number]]  (Abnormal) Collected:  05/31/17 2349    Specimen:  Urine from Urine, Clean Catch Updated:  06/01/17 0003     Color, UA Yellow     Appearance, UA Clear     pH, UA <=5.0     Specific Gravity, UA 1.021     Glucose, UA Negative     Ketones, UA 15 mg/dL (1+) (A)     Bilirubin, UA Negative     Blood, UA Negative     Protein, UA Negative     Leuk Esterase, UA Negative     Nitrite, UA Negative     Urobilinogen, UA 0.2 E.U./dL    Narrative:       Urine microscopic not indicated.          Lab Results   Component Value Date    CALCIUM 9.1 05/31/2017     Results from last 7 days  Lab Units 05/31/17 2026   SODIUM mmol/L 139   POTASSIUM mmol/L 3.8   CHLORIDE mmol/L 99   TOTAL CO2 mmol/L 24.8   BUN mg/dL 20   CREATININE mg/dL 1.09   GLUCOSE mg/dL 96   CALCIUM mg/dL 9.1   WBC 10*3/mm3 17.50*   HEMOGLOBIN g/dL 14.4   PLATELETS 10*3/mm3 270   ALT (SGPT) U/L 15   AST (SGOT) U/L 17   PROBNP pg/mL 123.0         Results from last 7 days  Lab Units 05/31/17 2026   TROPONIN T ng/mL <0.010           Results from last 7 days  Lab Units 05/31/17 2026   LACTATE mmol/L 1.3       Lab Results   Component Value Date    TSH 2.120 04/19/2017     Estimated Creatinine Clearance: 57.8 mL/min (by C-G formula based on Cr of 1.09).    I visualized images of his chest x-ray.  He has some coarse interstitial prominence bilaterally, but otherwise there is no indication of acute infiltrate or effusion.    ASSESSMENT:  Acute exacerbation of bronchiectasis  COPD  Chronic respiratory failure        PLAN:  This patient may remain as an observation status.  He probably will go home tomorrow.  He is not that ill.  We will hold off on any antibiotics.  There are no signs or symptoms of infection.  There is no indication of fluid overload.  There is no isolation of any viral  infection of the upper airways.      Percy Ingram MD  6/1/2017  12:52 AM

## 2017-06-01 NOTE — PROGRESS NOTES
Discharge Planning Assessment  [facility]     Patient Name: Tony Leonard  MRN: [id number]  Today's Date: 6/1/2017    Admit Date: 5/31/2017          Discharge Needs Assessment     None            Discharge Plan     None        Discharge Placement     No information found                Demographic Summary     None            Functional Status     None            Psychosocial       06/01/17 1003    Coping/Stress    Coping/Stress Comments Patient reports has a court appt today 6/1/17 @0250 and is concerned that they need to be notified; Called [facility] [address] traffic division and spoke to Luna in the call center to notify of pt admission; Advised patient RN to make pt aware.            Abuse/Neglect     None            Legal     None            Substance Abuse     None            Patient Forms     None          Yessica Hodge RN

## 2017-06-01 NOTE — ED NOTES
"Asked pt if able to give urine sample. Pt responded \"no water, no urine. I can't urinate without having water.\" RN notified     Chelsea Loco  05/31/17 2224    "

## 2017-06-01 NOTE — PLAN OF CARE
Problem: Patient Care Overview (Adult)  Goal: Plan of Care Review  Outcome: Ongoing (interventions implemented as appropriate)    06/01/17 0514   Coping/Psychosocial Response Interventions   Plan Of Care Reviewed With patient   Patient Care Overview   Progress no change   Outcome Evaluation   Outcome Summary/Follow up Plan new admit from ER with bronchitis, pt on 2L O2 and wears this amount at home, alert and oriented, up with assist x 1, no falls, urinal at bedside, complains of restless legs, requip given, continue to monitor        Goal: Adult Individualization and Mutuality  Outcome: Ongoing (interventions implemented as appropriate)  Goal: Discharge Needs Assessment  Outcome: Ongoing (interventions implemented as appropriate)    Problem: Fall Risk (Adult)  Goal: Identify Related Risk Factors and Signs and Symptoms  Outcome: Outcome(s) achieved Date Met:  06/01/17  Goal: Absence of Falls  Outcome: Ongoing (interventions implemented as appropriate)    Problem: Respiratory Insufficiency (Adult)  Goal: Identify Related Risk Factors and Signs and Symptoms  Outcome: Outcome(s) achieved Date Met:  06/01/17  Goal: Acid/Base Balance  Outcome: Ongoing (interventions implemented as appropriate)  Goal: Effective Ventilation  Outcome: Ongoing (interventions implemented as appropriate)

## 2017-06-02 LAB
BASOPHILS # BLD AUTO: 0.01 10*3/MM3 (ref 0–0.2)
BASOPHILS NFR BLD AUTO: 0 % (ref 0–1.5)
DEPRECATED RDW RBC AUTO: 48.2 FL (ref 37–54)
EOSINOPHIL # BLD AUTO: 0 10*3/MM3 (ref 0–0.7)
EOSINOPHIL NFR BLD AUTO: 0 % (ref 0.3–6.2)
ERYTHROCYTE [DISTWIDTH] IN BLOOD BY AUTOMATED COUNT: 14.2 % (ref 11.5–14.5)
HCT VFR BLD AUTO: 41 % (ref 40.4–52.2)
HGB BLD-MCNC: 13.9 G/DL (ref 13.7–17.6)
IMM GRANULOCYTES # BLD: 0.12 10*3/MM3 (ref 0–0.03)
IMM GRANULOCYTES NFR BLD: 0.5 % (ref 0–0.5)
LYMPHOCYTES # BLD AUTO: 1.02 10*3/MM3 (ref 0.9–4.8)
LYMPHOCYTES NFR BLD AUTO: 4.4 % (ref 19.6–45.3)
MCH RBC QN AUTO: 31.4 PG (ref 27–32.7)
MCHC RBC AUTO-ENTMCNC: 33.9 G/DL (ref 32.6–36.4)
MCV RBC AUTO: 92.8 FL (ref 79.8–96.2)
MONOCYTES # BLD AUTO: 1.21 10*3/MM3 (ref 0.2–1.2)
MONOCYTES NFR BLD AUTO: 5.2 % (ref 5–12)
NEUTROPHILS # BLD AUTO: 20.92 10*3/MM3 (ref 1.9–8.1)
NEUTROPHILS NFR BLD AUTO: 89.9 % (ref 42.7–76)
PLATELET # BLD AUTO: 286 10*3/MM3 (ref 140–500)
PMV BLD AUTO: 9.4 FL (ref 6–12)
PROCALCITONIN SERPL-MCNC: 0.27 NG/ML (ref 0.1–0.25)
RBC # BLD AUTO: 4.42 10*6/MM3 (ref 4.6–6)
WBC NRBC COR # BLD: 23.28 10*3/MM3 (ref 4.5–10.7)

## 2017-06-02 PROCEDURE — 87205 SMEAR GRAM STAIN: CPT | Performed by: NURSE PRACTITIONER

## 2017-06-02 PROCEDURE — 85025 COMPLETE CBC W/AUTO DIFF WBC: CPT | Performed by: NURSE PRACTITIONER

## 2017-06-02 PROCEDURE — 63710000001 PREDNISONE PER 1 MG: Performed by: INTERNAL MEDICINE

## 2017-06-02 PROCEDURE — 96372 THER/PROPH/DIAG INJ SC/IM: CPT

## 2017-06-02 PROCEDURE — 84145 PROCALCITONIN (PCT): CPT | Performed by: NURSE PRACTITIONER

## 2017-06-02 PROCEDURE — 94799 UNLISTED PULMONARY SVC/PX: CPT

## 2017-06-02 PROCEDURE — G0378 HOSPITAL OBSERVATION PER HR: HCPCS

## 2017-06-02 PROCEDURE — 25010000002 ENOXAPARIN PER 10 MG: Performed by: INTERNAL MEDICINE

## 2017-06-02 RX ORDER — GUAIFENESIN/DEXTROMETHORPHAN 100-10MG/5
5 SYRUP ORAL EVERY 12 HOURS
Status: DISCONTINUED | OUTPATIENT
Start: 2017-06-02 | End: 2017-06-02

## 2017-06-02 RX ORDER — SODIUM CHLORIDE FOR INHALATION 7 %
4 VIAL, NEBULIZER (ML) INHALATION
Status: DISCONTINUED | OUTPATIENT
Start: 2017-06-02 | End: 2017-06-03 | Stop reason: HOSPADM

## 2017-06-02 RX ORDER — PANTOPRAZOLE SODIUM 40 MG/1
40 TABLET, DELAYED RELEASE ORAL EVERY MORNING
Status: DISCONTINUED | OUTPATIENT
Start: 2017-06-02 | End: 2017-06-03 | Stop reason: HOSPADM

## 2017-06-02 RX ORDER — FLUOXETINE HYDROCHLORIDE 20 MG/1
60 CAPSULE ORAL DAILY
Status: DISCONTINUED | OUTPATIENT
Start: 2017-06-02 | End: 2017-06-03 | Stop reason: HOSPADM

## 2017-06-02 RX ORDER — GUAIFENESIN 600 MG/1
600 TABLET, EXTENDED RELEASE ORAL 2 TIMES DAILY
Status: DISCONTINUED | OUTPATIENT
Start: 2017-06-02 | End: 2017-06-02

## 2017-06-02 RX ORDER — CETIRIZINE HYDROCHLORIDE 10 MG/1
5 TABLET ORAL DAILY
Status: DISCONTINUED | OUTPATIENT
Start: 2017-06-02 | End: 2017-06-03 | Stop reason: HOSPADM

## 2017-06-02 RX ORDER — MULTIPLE VITAMINS W/ MINERALS TAB 9MG-400MCG
1 TAB ORAL DAILY
Status: DISCONTINUED | OUTPATIENT
Start: 2017-06-02 | End: 2017-06-03 | Stop reason: HOSPADM

## 2017-06-02 RX ORDER — GUAIFENESIN 600 MG/1
1200 TABLET, EXTENDED RELEASE ORAL 2 TIMES DAILY
Status: DISCONTINUED | OUTPATIENT
Start: 2017-06-02 | End: 2017-06-03 | Stop reason: HOSPADM

## 2017-06-02 RX ORDER — DOXYCYCLINE 100 MG/1
100 CAPSULE ORAL EVERY 12 HOURS SCHEDULED
Status: DISCONTINUED | OUTPATIENT
Start: 2017-06-02 | End: 2017-06-03 | Stop reason: HOSPADM

## 2017-06-02 RX ADMIN — GUAIFENESIN 1200 MG: 600 TABLET, EXTENDED RELEASE ORAL at 14:19

## 2017-06-02 RX ADMIN — PREDNISONE 40 MG: 20 TABLET ORAL at 08:43

## 2017-06-02 RX ADMIN — DOXYCYCLINE 100 MG: 100 CAPSULE ORAL at 21:17

## 2017-06-02 RX ADMIN — ROPINIROLE HYDROCHLORIDE 1 MG: 1 TABLET, FILM COATED ORAL at 21:17

## 2017-06-02 RX ADMIN — IPRATROPIUM BROMIDE AND ALBUTEROL SULFATE 3 ML: .5; 3 SOLUTION RESPIRATORY (INHALATION) at 16:44

## 2017-06-02 RX ADMIN — ENOXAPARIN SODIUM 30 MG: 30 INJECTION SUBCUTANEOUS at 08:43

## 2017-06-02 RX ADMIN — IPRATROPIUM BROMIDE AND ALBUTEROL SULFATE 3 ML: .5; 3 SOLUTION RESPIRATORY (INHALATION) at 19:58

## 2017-06-02 RX ADMIN — Medication 1 LOZENGE: at 00:29

## 2017-06-02 RX ADMIN — IPRATROPIUM BROMIDE AND ALBUTEROL SULFATE 3 ML: .5; 3 SOLUTION RESPIRATORY (INHALATION) at 11:37

## 2017-06-02 RX ADMIN — PANTOPRAZOLE SODIUM 40 MG: 40 TABLET, DELAYED RELEASE ORAL at 14:19

## 2017-06-02 RX ADMIN — GUAIFENESIN 1200 MG: 600 TABLET, EXTENDED RELEASE ORAL at 17:01

## 2017-06-02 RX ADMIN — SODIUM CHLORIDE SOLN NEBU 7% 4 ML: 7 NEBU SOLN at 19:58

## 2017-06-02 RX ADMIN — CETIRIZINE HYDROCHLORIDE 5 MG: 10 TABLET, FILM COATED ORAL at 14:18

## 2017-06-02 RX ADMIN — IPRATROPIUM BROMIDE AND ALBUTEROL SULFATE 3 ML: .5; 3 SOLUTION RESPIRATORY (INHALATION) at 08:44

## 2017-06-02 RX ADMIN — SODIUM CHLORIDE SOLN NEBU 7% 4 ML: 7 NEBU SOLN at 16:48

## 2017-06-02 RX ADMIN — MULTIPLE VITAMINS W/ MINERALS TAB 1 TABLET: TAB at 14:19

## 2017-06-02 RX ADMIN — FLUOXETINE HYDROCHLORIDE 60 MG: 20 CAPSULE ORAL at 14:18

## 2017-06-02 NOTE — PROGRESS NOTES
"  PROGRESS NOTE   LOS: 1 day   Patient Care Team:  Erich Aviles MD as PCP - General (Internal Medicine)  Erich Aviles MD as PCP - Claims Attributed    Chief Complaint:tremors, fever and shortness of breath    Interval History: pt was admitted 5/31/17 with complaints of fever, tremors and shortness of breath. He had a fever of 101.7 on admission. He reports frequency of urine but UA showed no sign of infection. He was bitten by something on his left thigh the day prior to admission and [place]. His viral panel was negative and lactic acid was normal. Blood culture has shown no growth to date. He still feels weak and has a productive yellow sputum cough with thick secretions that are sometimes difficult to expectorate. He was treated with bronchodilators and steroids.     REVIEW OF SYSTEMS:   CARDIOVASCULAR: No chest pain, chest pressure or chest discomfort. No palpitations or edema.   RESPIRATORY: RODRIGUEZ,  productive yellow sputum cough with thick secretions that are sometimes difficult to expectorate.  GASTROINTESTINAL: No anorexia, nausea, vomiting or diarrhea. No abdominal pain or blood.   HEMATOLOGIC: No bleeding or bruising.     Ventilator/Non-Invasive Ventilation Settings     None          Vital Signs  Temp:  [97.5 °F (36.4 °C)-97.9 °F (36.6 °C)] 97.9 °F (36.6 °C)  Heart Rate:  [] 95  Resp:  [18-22] 22  BP: (126-140)/(61-76) 140/76  SpO2:  [90 %-94 %] 92 %  on  Flow (L/min):  [2] 2 O2 Device: nasal cannula    Intake/Output Summary (Last 24 hours) at 06/02/17 1645  Last data filed at 06/02/17 1424   Gross per 24 hour   Intake                0 ml   Output             1400 ml   Net            -1400 ml     Flowsheet Rows         First Filed Value    Admission Height  71\" (180.3 cm) Documented at 05/31/2017 1933    Admission Weight  164 lb (74.4 kg) Documented at 05/31/2017 1933        Last 3 weights    06/01/17  0201 06/01/17  1907 06/02/17  0503   Weight: 165 lb 9.6 oz (75.1 kg) 166 lb " (75.3 kg) 171 lb 3.2 oz (77.7 kg)       Physical Exam:  GEN:  No acute distress, alert, cooperative, well developed on oxygen per NC  EYES:   Sclera clear. No icterus. PERRL.   ENT:   External ears/nose normal, no oral lesions, no thrush, mucous membranes moist  NECK:  Supple, midline trachea  LUNGS: Normal chest on inspection, coarse rhonchi with inspiratory wheezes worse on the left. No crackles. Respirations regular, even and unlabored.   CV:  Regular rhythm and slightly tachycardic. Normal S1/S2. No murmurs, gallops, or rubs noted.  ABD:  Soft, non-tender and non-distended. Normal bowel sounds. No guarding  EXT:  Moves all extremities well. No cyanosis. No redness. 1+ BLE edema.   Skin: dry, intact, no bleeding    Results Review:        Results from last 7 days  Lab Units 05/31/17 2026   SODIUM mmol/L 139   POTASSIUM mmol/L 3.8   CHLORIDE mmol/L 99   TOTAL CO2 mmol/L 24.8   BUN mg/dL 20   CREATININE mg/dL 1.09   CALCIUM mg/dL 9.1   AST (SGOT) U/L 17   ALT (SGPT) U/L 15   ANION GAP mmol/L 15.2   ALBUMIN g/dL 3.80       Results from last 7 days  Lab Units 05/31/17 2026   TROPONIN T ng/mL <0.010       Results from last 7 days  Lab Units 06/02/17  1127 05/31/17 2026   WBC 10*3/mm3 23.28* 17.50*   HEMOGLOBIN g/dL 13.9 14.4   HEMATOCRIT % 41.0 42.9   PLATELETS 10*3/mm3 286 270   NEUTROPHIL % % 89.9* 81.1*   LYMPHOCYTE % % 4.4* 7.8*   MONOCYTES % % 5.2 6.9   EOSINOPHIL % % 0.0* 3.8   BASOPHIL % % 0.0 0.2   IMM GRAN % % 0.5 0.2       Results from last 7 days  Lab Units 05/31/17 2026   INR  1.09                     No results found for: POCGLU    Results from last 7 days  Lab Units 06/02/17  1127 05/31/17 2026   PROCALCITONIN ng/mL 0.27*  --    LACTATE mmol/L  --  1.3       Results from last 7 days  Lab Units 05/31/17 2026   PROBNP pg/mL 123.0       Results from last 7 days  Lab Units 05/31/17 2105 05/31/17 2036   BLOODCX  No growth at 24 hours No growth at 24 hours       Results from last 7 days  Lab Units  05/31/17  2349   NITRITE UA  Negative       Results from last 7 days  Lab Units 05/31/17  2030   ADENOVIRUS DETECTION BY PCR  Not Detected   CORONAVIRUS 229E  Not Detected   CORONAVIRUS HKU1  Not Detected   CORONAVIRUS NL63  Not Detected   CORONAVIRUS OC43  Not Detected   HUMAN METAPNEUMOVIRUS  Not Detected   HUMAN RHINOVIRUS/ENTEROVIRUS  Not Detected   INFLUENZA B PCR  Not Detected   PARAINFLUENZA 1  Not Detected   PARAINFLUENZA VIRUS 2  Not Detected   PARAINFLUENZA VIRUS 3  Not Detected   PARAINFLUENZA VIRUS 4  Not Detected   BORDETELLA PERTUSSIS PCR  Not Detected   INFLUENZA 2009 H1N1 BY PCR  Not Detected   CHLAMYDOPHILA PNEUMONIAE PCR  Not Detected   MYCOPLAMA PNEUMO PCR  Not Detected   INFLUENZA A PCR  Not Detected   INFLUENZA A H3  Not Detected   INFLUENZA A H1  Not Detected   RSV, PCR  Not Detected           Imaging Results (all)     Procedure Component Value Units Date/Time    XR Chest 2 View [[id number]] Collected:  05/31/17 2138     Updated:  06/02/17 0026    Narrative:       CHEST PA AND LATERAL.     HISTORY: Cough.     COMPARISON: 04/27/2017.     FINDINGS:  Cardiomediastinal silhouette is within normal limits.         There is no consolidation or effusion. Prominence of interstitial  markings is present.          Impression:       Bronchitis cannot be excluded, clinical correlation is recommended.         This report was finalized on 6/2/2017 12:22 AM by Dr. Dulce Maria Calle MD.       CT Chest Without Contrast [[id number]] Collected:  05/31/17 2244     Updated:  06/02/17 0045    Narrative:       CT SCAN OF THE CHEST WITHOUT CONTRAST.     TECHNIQUE: Routine axial images of the chest were obtained with  reconstructed images.     HISTORY:  Cough and fever.     COMPARISON:  04/27/2017.     FINDINGS:   There is no mediastinal lymphadenopathy or pericardial effusion.  Aneurysmal 4.3 cm ascending aorta, aneurysmal 2.2 cm descending thoracic  aorta remain unchanged. Moderate atherosclerotic disease.     No  consolidation or effusion. Mild bibasilar atelectasis. Bronchiectasis  in both lower lobes, the dilated bronchi may be filled with  secretions/fluid, which is a new finding compared to prior study.     Upper abdominal viscera are essentially unremarkable.           Impression:       1.  No consolidation or effusion, bibasilar atelectasis.  2.  Mild bilateral bronchiectasis in the lower lobes, the bronchi appear  to be filled with secretions/fluid.  3.  Aneurysmal aorta and moderate atherosclerosis.     This report was finalized on 6/2/2017 12:42 AM by Dr. Dulce Maria Calle MD.             I reviewed the patient's new clinical results.  I personally viewed and interpreted the patient's imaging results:        Medication Review:     cetirizine 5 mg Oral Daily   doxycycline 100 mg Oral Q12H   enoxaparin 30 mg Subcutaneous Daily   FLUoxetine 60 mg Oral Daily   guaiFENesin 1,200 mg Oral BID   ipratropium-albuterol 3 mL Nebulization 4x Daily - RT   multivitamin with minerals 1 tablet Oral Daily   pantoprazole 40 mg Oral QAM   predniSONE 40 mg Oral Daily With Breakfast   rOPINIRole 1 mg Oral Nightly   sodium chloride 4 mL Nebulization BID - RT            ASSESSMENT:   1. Acute exacerbation of bronchiectasis, with positive PCT and elevated WBC  2. COPD  3. Chronic hypoxia respiratory failure  4. Fever- resolved  5. ADHD     PLAN:  Resume home meds  Start doxycycline for antibiotics  Continue steroids and taper as improves  Add flutter valve and obtain respiratory sample  MRSA screen  Plan to discharge home in am if doing better on the above regimen with transitional care follow up in 1 week     Disposition: home. Follow up with me next week     Katharina Salter MD  06/02/17  4:45 PM          EMR Dragon/Transcription disclaimer:   Much of this encounter note is an electronic transcription/translation of spoken language to printed text. The electronic translation of spoken language may permit erroneous, or at times, nonsensical  words or phrases to be inadvertently transcribed; Although I have reviewed the note for such errors, some may still exist.

## 2017-06-02 NOTE — PLAN OF CARE
Problem: Patient Care Overview (Adult)  Goal: Plan of Care Review    06/02/17 0308   Coping/Psychosocial Response Interventions   Plan Of Care Reviewed With patient   Patient Care Overview   Progress no change   Outcome Evaluation   Outcome Summary/Follow up Plan Patient had restless night at times d/t coughing. Unable to dislodge secretions.  Cough drops provided per patient request.  O2 dropped to 88% during episodes of coughing and 02 increased to 3 liters.  Stand by assist provided during ambulation to bathroom.

## 2017-06-02 NOTE — PLAN OF CARE
Problem: Respiratory Insufficiency (Adult)  Goal: Acid/Base Balance  Outcome: Ongoing (interventions implemented as appropriate)  Goal: Effective Ventilation  Outcome: Ongoing (interventions implemented as appropriate)

## 2017-06-03 VITALS
HEART RATE: 99 BPM | OXYGEN SATURATION: 94 % | RESPIRATION RATE: 20 BRPM | SYSTOLIC BLOOD PRESSURE: 120 MMHG | TEMPERATURE: 97.4 F | DIASTOLIC BLOOD PRESSURE: 50 MMHG | WEIGHT: 176 LBS | HEIGHT: 71 IN | BODY MASS INDEX: 24.64 KG/M2

## 2017-06-03 PROCEDURE — 63710000001 PREDNISONE PER 1 MG: Performed by: INTERNAL MEDICINE

## 2017-06-03 PROCEDURE — 94799 UNLISTED PULMONARY SVC/PX: CPT

## 2017-06-03 PROCEDURE — 87081 CULTURE SCREEN ONLY: CPT | Performed by: NURSE PRACTITIONER

## 2017-06-03 PROCEDURE — 96372 THER/PROPH/DIAG INJ SC/IM: CPT

## 2017-06-03 PROCEDURE — 25010000002 ENOXAPARIN PER 10 MG: Performed by: INTERNAL MEDICINE

## 2017-06-03 PROCEDURE — G0378 HOSPITAL OBSERVATION PER HR: HCPCS

## 2017-06-03 RX ORDER — DOXYCYCLINE 100 MG/1
100 CAPSULE ORAL EVERY 12 HOURS SCHEDULED
Qty: 14 CAPSULE | Refills: 0 | Status: SHIPPED | OUTPATIENT
Start: 2017-06-03 | End: 2017-06-21 | Stop reason: HOSPADM

## 2017-06-03 RX ORDER — PREDNISONE 20 MG/1
40 TABLET ORAL
Qty: 6 TABLET | Refills: 0 | Status: SHIPPED | OUTPATIENT
Start: 2017-06-03 | End: 2017-06-06

## 2017-06-03 RX ADMIN — IPRATROPIUM BROMIDE AND ALBUTEROL SULFATE 3 ML: .5; 3 SOLUTION RESPIRATORY (INHALATION) at 20:11

## 2017-06-03 RX ADMIN — FLUOXETINE HYDROCHLORIDE 60 MG: 20 CAPSULE ORAL at 08:44

## 2017-06-03 RX ADMIN — DOXYCYCLINE 100 MG: 100 CAPSULE ORAL at 08:44

## 2017-06-03 RX ADMIN — PANTOPRAZOLE SODIUM 40 MG: 40 TABLET, DELAYED RELEASE ORAL at 06:26

## 2017-06-03 RX ADMIN — CETIRIZINE HYDROCHLORIDE 5 MG: 10 TABLET, FILM COATED ORAL at 08:44

## 2017-06-03 RX ADMIN — GUAIFENESIN 1200 MG: 600 TABLET, EXTENDED RELEASE ORAL at 19:03

## 2017-06-03 RX ADMIN — PREDNISONE 40 MG: 20 TABLET ORAL at 08:43

## 2017-06-03 RX ADMIN — GUAIFENESIN 1200 MG: 600 TABLET, EXTENDED RELEASE ORAL at 08:43

## 2017-06-03 RX ADMIN — IPRATROPIUM BROMIDE AND ALBUTEROL SULFATE 3 ML: .5; 3 SOLUTION RESPIRATORY (INHALATION) at 16:24

## 2017-06-03 RX ADMIN — IPRATROPIUM BROMIDE AND ALBUTEROL SULFATE 3 ML: .5; 3 SOLUTION RESPIRATORY (INHALATION) at 07:49

## 2017-06-03 RX ADMIN — SODIUM CHLORIDE SOLN NEBU 7% 4 ML: 7 NEBU SOLN at 07:49

## 2017-06-03 RX ADMIN — IPRATROPIUM BROMIDE AND ALBUTEROL SULFATE 3 ML: .5; 3 SOLUTION RESPIRATORY (INHALATION) at 11:27

## 2017-06-03 RX ADMIN — MULTIPLE VITAMINS W/ MINERALS TAB 1 TABLET: TAB at 08:43

## 2017-06-03 RX ADMIN — ENOXAPARIN SODIUM 30 MG: 30 INJECTION SUBCUTANEOUS at 08:43

## 2017-06-03 NOTE — PROGRESS NOTES
LOS: 1 day   Patient Care Team:  Erich Aviles MD as PCP - General (Internal Medicine)  Erich Aviles MD as PCP - Claims Attributed        Subjective: Patient says he feels fantastic today says he can't believe how much better he feels he wants to go home he still has a little bit of cough is not getting much mucus up.  No shortness of breath on his home 2 L O2.  No fevers chills no chest pain no nausea vomiting.  He says the little bug bite on his left thigh was 3 weeks ago and it is faded dramatically            Objective     Vital Signs  Temp:  [97.4 °F (36.3 °C)-98.3 °F (36.8 °C)] (P) 97.4 °F (36.3 °C)  Heart Rate:  [80-98] 85  Resp:  [16-20] 20  BP: (127-140)/(69-80) (P) 120/50  Body mass index is 24.55 kg/(m^2).    Intake/Output Summary (Last 24 hours) at 06/03/17 1812  Last data filed at 06/03/17 1300   Gross per 24 hour   Intake              480 ml   Output              240 ml   Net              240 ml     I/O this shift:  In: 480 [P.O.:480]  Out: 240 [Urine:240]    Physical Exam:  General Appearance: Well-developed white male resting comfortably in bed in no apparent distress on 2 L nasal cannula O2 with saturations of 98%  Eyes: Conjunctiva are clear and anicteric  ENT: Mucus membranes moist no exudates  Neck: No adenopathy or thyromegaly no jugular venous distention trachea midline  Lungs: Clear I don't hear any wheezes rales or rhonchi he does have a cough with his initial deep breath that he took but other than that completely clear symmetric nonlabored expansion  Cardiac: Regular rate and rhythm no murmur  Abdomen: Soft no palpable organomegaly or masses  : Not examined  Musc/Skel: Grossly normal  Skin: No jaundice no rashes no petechiae I can see there is a very faint dark sort of brown area on his left thigh where he reports a bug bite a few weeks ago  Neuro: Alert oriented cooperative moving extremities grossly intact  Extremities/P Vascular: No clubbing cyanosis or edema he has  palpable radial dorsalis pedis pulses bilaterally  MSE: In good spirits        Labs:  WBC No results found for: WBCS   HGB Hemoglobin   Date Value Ref Range Status   06/02/2017 13.9 13.7 - 17.6 g/dL Final   05/31/2017 14.4 13.7 - 17.6 g/dL Final      HCT Hematocrit   Date Value Ref Range Status   06/02/2017 41.0 40.4 - 52.2 % Final   05/31/2017 42.9 40.4 - 52.2 % Final      Platlets No results found for: LABPLAT     PT/INR:    Protime   Date Value Ref Range Status   05/31/2017 13.7 11.7 - 14.2 Seconds Final   /  INR   Date Value Ref Range Status   05/31/2017 1.09 0.90 - 1.10 Final       Sodium Sodium   Date Value Ref Range Status   05/31/2017 139 136 - 145 mmol/L Final      Potassium Potassium   Date Value Ref Range Status   05/31/2017 3.8 3.5 - 5.2 mmol/L Final      Chloride Chloride   Date Value Ref Range Status   05/31/2017 99 98 - 107 mmol/L Final      Bicarbonate No results found for: PLASMABICARB   BUN BUN   Date Value Ref Range Status   05/31/2017 20 8 - 23 mg/dL Final      Creatinine Creatinine   Date Value Ref Range Status   05/31/2017 1.09 0.76 - 1.27 mg/dL Final      Calcium Calcium   Date Value Ref Range Status   05/31/2017 9.1 8.6 - 10.5 mg/dL Final      Magnesium No results found for: MG         pH No results found for: PHART   pO2 No results found for: PO2ART   pCO2 No results found for: NNQ2VKE   HCO3 No results found for: RYY3BCR       cetirizine 5 mg Oral Daily   doxycycline 100 mg Oral Q12H   enoxaparin 30 mg Subcutaneous Daily   FLUoxetine 60 mg Oral Daily   guaiFENesin 1,200 mg Oral BID   ipratropium-albuterol 3 mL Nebulization 4x Daily - RT   multivitamin with minerals 1 tablet Oral Daily   pantoprazole 40 mg Oral QAM   predniSONE 40 mg Oral Daily With Breakfast   rOPINIRole 1 mg Oral Nightly   sodium chloride 4 mL Nebulization BID - RT          Diagnostics:  Imaging Results (last 24 hours)     ** No results found for the last 24 hours. **          Did review his CT scan of the  chest        Assessment/Plan     Patient Active Problem List   Diagnosis Code   • Thrush, oral B37.0   • ADD (attention deficit disorder) F98.8   • Hemorrhoids K64.9   • Bronchiectasis without complication J47.9   • Diverticul disease small and large intestine, no perforati or abscess K57.50   • Benign non-nodular prostatic hyperplasia without lower urinary tract symptoms N40.0   • Gastroesophageal reflux disease K21.9   • Malaise and fatigue R53.81, R53.83   • Environmental allergies Z91.09   • Hemoptysis R04.2   • Dyslipidemia E78.5   • Primary osteoarthritis involving multiple joints M15.0   • Pneumonia of left upper lobe due to infectious organism J18.9   • Abnormal EKG R94.31   • Chronic respiratory failure with hypoxia J96.11   • COPD (chronic obstructive pulmonary disease) J44.9   • Pneumonia of left lower lobe due to infectious organism J18.9   • Mild malnutrition E44.1   • Acute respiratory failure with hypoxia J96.01   • Bronchitis J40       Impression #1 acute exacerbation bronchiectasis no definite pneumonia on CT scan he did have some mucous plugging in his bronchiectatic segments in the lower lobes she had an elevated white count and pro-calcitonin we will complete course of antibiotics.  #2 COPD better continue bronchodilators  #3 chronic hypoxemic respiratory failure his oxygen a very well on home O2  Number for ADHD    Plan for disposition: Discharge home with follow-up with Dr. Salter next week in the office    Pablo Sanabria MD  06/03/17  6:12 PM    Time:

## 2017-06-03 NOTE — PLAN OF CARE
Problem: Patient Care Overview (Adult)  Goal: Plan of Care Review  Outcome: Ongoing (interventions implemented as appropriate)    06/03/17 1653   Coping/Psychosocial Response Interventions   Plan Of Care Reviewed With patient   Patient Care Overview   Progress progress toward functional goals as expected   Outcome Evaluation   Outcome Summary/Follow up Plan Vital signs stable. Alert and oriented. breathing is stable. patient on Duo-Nebs. Will continue to monitor.       Goal: Discharge Needs Assessment  Outcome: Ongoing (interventions implemented as appropriate)    Problem: Fall Risk (Adult)  Goal: Absence of Falls  Outcome: Ongoing (interventions implemented as appropriate)    06/03/17 1653   Fall Risk (Adult)   Absence of Falls making progress toward outcome

## 2017-06-03 NOTE — PLAN OF CARE
Problem: Patient Care Overview (Adult)  Goal: Plan of Care Review  Outcome: Ongoing (interventions implemented as appropriate)    06/03/17 9665   Coping/Psychosocial Response Interventions   Plan Of Care Reviewed With patient   Patient Care Overview   Progress improving   Outcome Evaluation   Outcome Summary/Follow up Plan Vital signs stable; no complaints of pain; pt continues to have frequent productive cough; continue medications as ordered; on 2L oxygen; possible d/c today; resting comfortably in bed; will continue to monitor.          Problem: Fall Risk (Adult)  Goal: Absence of Falls  Outcome: Ongoing (interventions implemented as appropriate)    Problem: Respiratory Insufficiency (Adult)  Goal: Acid/Base Balance  Outcome: Ongoing (interventions implemented as appropriate)  Goal: Effective Ventilation  Outcome: Ongoing (interventions implemented as appropriate)

## 2017-06-03 NOTE — DISCHARGE SUMMARY
Discharge summary    Discharge diagnoses:  #1 acute exacerbation bronchiectasis  #2 COPD with acute exacerbation  #3 chronic hypoxic restaurant failure  #4 ADHD    Hospital course:  Patient presented with the cough shortness of breath fever chills pro-calcitonin white count or elevated CT scan didn't show any definite infiltrate did show a little bit of mucus in the bronchiectatic segments in the lower lobes patient was placed on antibiotics and some steroids he is markedly improved he is anxious to go home I think he complete the doxycycline at home and he has several more days of prednisone to complete 40 mg a day for 5 day course.  Follow-up with Dr. Salter in the office in a one-week follow-up

## 2017-06-03 NOTE — PLAN OF CARE
Problem: Patient Care Overview (Adult)  Goal: Adult Individualization and Mutuality  Outcome: Ongoing (interventions implemented as appropriate)    06/03/17 1748   Individualization   Patient Specific Goals discharge   Patient Specific Interventions discussed with MD       Goal: Discharge Needs Assessment  Outcome: Ongoing (interventions implemented as appropriate)    06/01/17 1537 06/03/17 1748   Discharge Needs Assessment   Discharge Disposition --  still a patient   Living Environment   Transportation Available family or friend will provide;car --    Self-Care   Equipment Currently Used at Home oxygen;walker, rolling;cane, straight --

## 2017-06-04 LAB
BACTERIA SPEC RESP CULT: NORMAL
GRAM STN SPEC: NORMAL

## 2017-06-05 ENCOUNTER — TELEPHONE (OUTPATIENT)
Dept: FAMILY MEDICINE CLINIC | Facility: CLINIC | Age: 77
End: 2017-06-05

## 2017-06-05 LAB
BACTERIA SPEC AEROBE CULT: NORMAL
BACTERIA SPEC AEROBE CULT: NORMAL
MRSA SPEC QL CULT: NORMAL

## 2017-06-05 NOTE — TELEPHONE ENCOUNTER
REFILL ON cyclobenzaprine (FLEXERIL) 10 MG tablet  Take 1 tablet by mouth 3 (three) times a day as needed for muscle spasms    BENZONATATE 200 MG 1 CAPS TWICE A DAY

## 2017-06-08 ENCOUNTER — OFFICE VISIT (OUTPATIENT)
Dept: FAMILY MEDICINE CLINIC | Facility: CLINIC | Age: 77
End: 2017-06-08

## 2017-06-08 VITALS
BODY MASS INDEX: 23.94 KG/M2 | HEART RATE: 84 BPM | HEIGHT: 71 IN | DIASTOLIC BLOOD PRESSURE: 64 MMHG | SYSTOLIC BLOOD PRESSURE: 124 MMHG | OXYGEN SATURATION: 97 % | WEIGHT: 171 LBS | TEMPERATURE: 98.3 F

## 2017-06-08 DIAGNOSIS — M15.9 PRIMARY OSTEOARTHRITIS INVOLVING MULTIPLE JOINTS: ICD-10-CM

## 2017-06-08 DIAGNOSIS — J18.9 PNEUMONIA OF LEFT UPPER LOBE DUE TO INFECTIOUS ORGANISM: ICD-10-CM

## 2017-06-08 DIAGNOSIS — J47.0 BRONCHIECTASIS WITH ACUTE LOWER RESPIRATORY INFECTION (HCC): Primary | ICD-10-CM

## 2017-06-08 PROCEDURE — 99213 OFFICE O/P EST LOW 20 MIN: CPT | Performed by: INTERNAL MEDICINE

## 2017-06-08 RX ORDER — METHYLPHENIDATE HYDROCHLORIDE 10 MG/1
TABLET ORAL
Qty: 30 TABLET | Refills: 0 | Status: SHIPPED | OUTPATIENT
Start: 2017-06-08 | End: 2017-08-01 | Stop reason: SDUPTHER

## 2017-06-08 RX ORDER — CYCLOBENZAPRINE HCL 10 MG
10 TABLET ORAL 3 TIMES DAILY PRN
Qty: 30 TABLET | Refills: 2 | Status: SHIPPED | OUTPATIENT
Start: 2017-06-08 | End: 2017-07-06 | Stop reason: SINTOL

## 2017-06-08 RX ORDER — BENZONATATE 200 MG/1
200 CAPSULE ORAL 2 TIMES DAILY PRN
COMMUNITY
End: 2017-06-08 | Stop reason: SDUPTHER

## 2017-06-08 RX ORDER — BENZONATATE 200 MG/1
200 CAPSULE ORAL 2 TIMES DAILY PRN
Qty: 60 CAPSULE | Refills: 2 | Status: SHIPPED | OUTPATIENT
Start: 2017-06-08 | End: 2017-09-07

## 2017-06-08 NOTE — PROGRESS NOTES
Subjective   Tony Leonard is a 77 y.o. male.     History of Present Illness   Patient is following up from bed  Hospital for bronchiectasis and pneumonia.  He is finishing his doxycycline and is stable present time.  He does have bronchodilators at home and was given anoro and will follow-up in one month.  His osteoarthritis is been stable over the last several months.  Patient did not want x-ray at this time.    Much of this encounter note is an electronic transcription/translation of spoken language to printed text.  The electronic translation of spoken language may permit erroneous, or at times, nonsensical words or phrases to be inadvertently transcribed.  Although I have reviewed the note for such errors, some may still exist.  The following portions of the patient's history were reviewed and updated as appropriate: allergies, current medications, past family history, past medical history, past social history, past surgical history and problem list.    Review of Systems   Constitutional: Negative for fatigue and fever.   HENT: Positive for congestion. Negative for trouble swallowing.    Eyes: Negative for discharge and visual disturbance.   Respiratory: Negative for choking and shortness of breath.    Cardiovascular: Negative for chest pain and palpitations.   Gastrointestinal: Negative for abdominal pain and blood in stool.   Endocrine: Negative.    Genitourinary: Negative for genital sores and hematuria.   Musculoskeletal: Negative for gait problem and joint swelling.   Skin: Negative for color change, pallor, rash and wound.   Allergic/Immunologic: Positive for environmental allergies. Negative for immunocompromised state.   Neurological: Negative for facial asymmetry and speech difficulty.   Psychiatric/Behavioral: Negative for hallucinations and suicidal ideas.       Objective   Physical Exam   Constitutional: He is oriented to person, place, and time. He appears well-developed and  well-nourished.   HENT:   Head: Normocephalic.   Eyes: Conjunctivae are normal. Pupils are equal, round, and reactive to light.   Neck: Normal range of motion. Neck supple.   Cardiovascular: Normal rate, regular rhythm and normal heart sounds.    Pulmonary/Chest: Effort normal and breath sounds normal.   Abdominal: Soft. Bowel sounds are normal.   Musculoskeletal: Normal range of motion.   Neurological: He is alert and oriented to person, place, and time.   Skin: Skin is warm and dry.   Psychiatric: He has a normal mood and affect. His behavior is normal. Judgment and thought content normal.   Nursing note and vitals reviewed.      Assessment/Plan   Problems Addressed this Visit        Respiratory    Bronchiectasis with acute lower respiratory infection - Primary    Relevant Medications    benzonatate (TESSALON) 200 MG capsule    Pneumonia of left upper lobe due to infectious organism    Relevant Medications    benzonatate (TESSALON) 200 MG capsule       Musculoskeletal and Integument    Primary osteoarthritis involving multiple joints

## 2017-06-09 LAB
MYCOBACTERIUM SPEC CULT: NORMAL
NIGHT BLUE STAIN TISS: NORMAL

## 2017-06-17 ENCOUNTER — HOSPITAL ENCOUNTER (INPATIENT)
Facility: HOSPITAL | Age: 77
LOS: 4 days | Discharge: HOME HEALTH | End: 2017-06-21
Attending: EMERGENCY MEDICINE | Admitting: INTERNAL MEDICINE

## 2017-06-17 ENCOUNTER — APPOINTMENT (OUTPATIENT)
Dept: GENERAL RADIOLOGY | Facility: HOSPITAL | Age: 77
End: 2017-06-17

## 2017-06-17 DIAGNOSIS — D72.829 LEUKOCYTOSIS, UNSPECIFIED TYPE: ICD-10-CM

## 2017-06-17 DIAGNOSIS — J47.1 BRONCHIECTASIS WITH ACUTE EXACERBATION (HCC): ICD-10-CM

## 2017-06-17 DIAGNOSIS — J44.1 COPD EXACERBATION (HCC): Primary | ICD-10-CM

## 2017-06-17 LAB
ALBUMIN SERPL-MCNC: 3.7 G/DL (ref 3.5–5.2)
ALBUMIN/GLOB SERPL: 1.4 G/DL
ALP SERPL-CCNC: 59 U/L (ref 39–117)
ALT SERPL W P-5'-P-CCNC: 16 U/L (ref 1–41)
ANION GAP SERPL CALCULATED.3IONS-SCNC: 13 MMOL/L
AST SERPL-CCNC: 20 U/L (ref 1–40)
BASOPHILS # BLD AUTO: 0.03 10*3/MM3 (ref 0–0.2)
BASOPHILS NFR BLD AUTO: 0.2 % (ref 0–1.5)
BILIRUB SERPL-MCNC: 0.6 MG/DL (ref 0.1–1.2)
BILIRUB UR QL STRIP: NEGATIVE
BUN BLD-MCNC: 18 MG/DL (ref 8–23)
BUN/CREAT SERPL: 18.6 (ref 7–25)
CALCIUM SPEC-SCNC: 8.9 MG/DL (ref 8.6–10.5)
CHLORIDE SERPL-SCNC: 98 MMOL/L (ref 98–107)
CK SERPL-CCNC: 83 U/L (ref 20–200)
CLARITY UR: CLEAR
CO2 SERPL-SCNC: 26 MMOL/L (ref 22–29)
COLOR UR: YELLOW
CREAT BLD-MCNC: 0.97 MG/DL (ref 0.76–1.27)
D-LACTATE SERPL-SCNC: 1.1 MMOL/L (ref 0.5–2)
DEPRECATED RDW RBC AUTO: 48.5 FL (ref 37–54)
EOSINOPHIL # BLD AUTO: 0.34 10*3/MM3 (ref 0–0.7)
EOSINOPHIL NFR BLD AUTO: 1.8 % (ref 0.3–6.2)
ERYTHROCYTE [DISTWIDTH] IN BLOOD BY AUTOMATED COUNT: 14.2 % (ref 11.5–14.5)
GFR SERPL CREATININE-BSD FRML MDRD: 75 ML/MIN/1.73
GLOBULIN UR ELPH-MCNC: 2.7 GM/DL
GLUCOSE BLD-MCNC: 99 MG/DL (ref 65–99)
GLUCOSE UR STRIP-MCNC: NEGATIVE MG/DL
HCT VFR BLD AUTO: 38.9 % (ref 40.4–52.2)
HGB BLD-MCNC: 13.1 G/DL (ref 13.7–17.6)
HGB UR QL STRIP.AUTO: NEGATIVE
IMM GRANULOCYTES # BLD: 0.06 10*3/MM3 (ref 0–0.03)
IMM GRANULOCYTES NFR BLD: 0.3 % (ref 0–0.5)
INR PPP: 1.02 (ref 0.9–1.1)
KETONES UR QL STRIP: NEGATIVE
LEUKOCYTE ESTERASE UR QL STRIP.AUTO: NEGATIVE
LIPASE SERPL-CCNC: 17 U/L (ref 13–60)
LYMPHOCYTES # BLD AUTO: 1.42 10*3/MM3 (ref 0.9–4.8)
LYMPHOCYTES NFR BLD AUTO: 7.6 % (ref 19.6–45.3)
MCH RBC QN AUTO: 31.3 PG (ref 27–32.7)
MCHC RBC AUTO-ENTMCNC: 33.7 G/DL (ref 32.6–36.4)
MCV RBC AUTO: 92.8 FL (ref 79.8–96.2)
MONOCYTES # BLD AUTO: 1.46 10*3/MM3 (ref 0.2–1.2)
MONOCYTES NFR BLD AUTO: 7.8 % (ref 5–12)
NEUTROPHILS # BLD AUTO: 15.4 10*3/MM3 (ref 1.9–8.1)
NEUTROPHILS NFR BLD AUTO: 82.3 % (ref 42.7–76)
NITRITE UR QL STRIP: NEGATIVE
NT-PROBNP SERPL-MCNC: 186.1 PG/ML (ref 0–1800)
PH UR STRIP.AUTO: 6.5 [PH] (ref 5–8)
PLATELET # BLD AUTO: 200 10*3/MM3 (ref 140–500)
PMV BLD AUTO: 9.6 FL (ref 6–12)
POTASSIUM BLD-SCNC: 3.8 MMOL/L (ref 3.5–5.2)
PROCALCITONIN SERPL-MCNC: 1.23 NG/ML (ref 0.1–0.25)
PROT SERPL-MCNC: 6.4 G/DL (ref 6–8.5)
PROT UR QL STRIP: NEGATIVE
PROTHROMBIN TIME: 13 SECONDS (ref 11.7–14.2)
RBC # BLD AUTO: 4.19 10*6/MM3 (ref 4.6–6)
SODIUM BLD-SCNC: 137 MMOL/L (ref 136–145)
SP GR UR STRIP: 1.02 (ref 1–1.03)
TROPONIN T SERPL-MCNC: <0.01 NG/ML (ref 0–0.03)
UROBILINOGEN UR QL STRIP: NORMAL
WBC NRBC COR # BLD: 18.71 10*3/MM3 (ref 4.5–10.7)

## 2017-06-17 PROCEDURE — 84484 ASSAY OF TROPONIN QUANT: CPT | Performed by: EMERGENCY MEDICINE

## 2017-06-17 PROCEDURE — 84145 PROCALCITONIN (PCT): CPT | Performed by: INTERNAL MEDICINE

## 2017-06-17 PROCEDURE — 81003 URINALYSIS AUTO W/O SCOPE: CPT | Performed by: EMERGENCY MEDICINE

## 2017-06-17 PROCEDURE — 93010 ELECTROCARDIOGRAM REPORT: CPT | Performed by: INTERNAL MEDICINE

## 2017-06-17 PROCEDURE — 94640 AIRWAY INHALATION TREATMENT: CPT

## 2017-06-17 PROCEDURE — 83880 ASSAY OF NATRIURETIC PEPTIDE: CPT | Performed by: EMERGENCY MEDICINE

## 2017-06-17 PROCEDURE — 87070 CULTURE OTHR SPECIMN AEROBIC: CPT | Performed by: INTERNAL MEDICINE

## 2017-06-17 PROCEDURE — 85025 COMPLETE CBC W/AUTO DIFF WBC: CPT | Performed by: EMERGENCY MEDICINE

## 2017-06-17 PROCEDURE — 25010000002 METHYLPREDNISOLONE PER 125 MG: Performed by: EMERGENCY MEDICINE

## 2017-06-17 PROCEDURE — 82550 ASSAY OF CK (CPK): CPT | Performed by: EMERGENCY MEDICINE

## 2017-06-17 PROCEDURE — 83690 ASSAY OF LIPASE: CPT | Performed by: EMERGENCY MEDICINE

## 2017-06-17 PROCEDURE — 87205 SMEAR GRAM STAIN: CPT | Performed by: INTERNAL MEDICINE

## 2017-06-17 PROCEDURE — 80053 COMPREHEN METABOLIC PANEL: CPT | Performed by: EMERGENCY MEDICINE

## 2017-06-17 PROCEDURE — 25010000002 LEVOFLOXACIN PER 250 MG: Performed by: INTERNAL MEDICINE

## 2017-06-17 PROCEDURE — 71020 HC CHEST PA AND LATERAL: CPT

## 2017-06-17 PROCEDURE — 99285 EMERGENCY DEPT VISIT HI MDM: CPT

## 2017-06-17 PROCEDURE — 87040 BLOOD CULTURE FOR BACTERIA: CPT | Performed by: EMERGENCY MEDICINE

## 2017-06-17 PROCEDURE — 94799 UNLISTED PULMONARY SVC/PX: CPT

## 2017-06-17 PROCEDURE — 85610 PROTHROMBIN TIME: CPT | Performed by: EMERGENCY MEDICINE

## 2017-06-17 PROCEDURE — 83605 ASSAY OF LACTIC ACID: CPT | Performed by: EMERGENCY MEDICINE

## 2017-06-17 PROCEDURE — 93005 ELECTROCARDIOGRAM TRACING: CPT | Performed by: EMERGENCY MEDICINE

## 2017-06-17 PROCEDURE — 25010000002 ENOXAPARIN PER 10 MG: Performed by: INTERNAL MEDICINE

## 2017-06-17 RX ORDER — ROPINIROLE 1 MG/1
1 TABLET, FILM COATED ORAL NIGHTLY
Status: DISCONTINUED | OUTPATIENT
Start: 2017-06-17 | End: 2017-06-21 | Stop reason: HOSPADM

## 2017-06-17 RX ORDER — METHYLPHENIDATE HYDROCHLORIDE 5 MG/1
10 TABLET ORAL DAILY
Status: DISCONTINUED | OUTPATIENT
Start: 2017-06-17 | End: 2017-06-21 | Stop reason: HOSPADM

## 2017-06-17 RX ORDER — ACETAMINOPHEN 325 MG/1
975 TABLET ORAL ONCE
Status: COMPLETED | OUTPATIENT
Start: 2017-06-17 | End: 2017-06-17

## 2017-06-17 RX ORDER — IPRATROPIUM BROMIDE AND ALBUTEROL SULFATE 2.5; .5 MG/3ML; MG/3ML
3 SOLUTION RESPIRATORY (INHALATION)
Status: DISCONTINUED | OUTPATIENT
Start: 2017-06-17 | End: 2017-06-21 | Stop reason: HOSPADM

## 2017-06-17 RX ORDER — ALBUTEROL SULFATE 2.5 MG/3ML
2.5 SOLUTION RESPIRATORY (INHALATION)
Status: COMPLETED | OUTPATIENT
Start: 2017-06-17 | End: 2017-06-17

## 2017-06-17 RX ORDER — SODIUM CHLORIDE 0.9 % (FLUSH) 0.9 %
1-10 SYRINGE (ML) INJECTION AS NEEDED
Status: DISCONTINUED | OUTPATIENT
Start: 2017-06-17 | End: 2017-06-21 | Stop reason: HOSPADM

## 2017-06-17 RX ORDER — SODIUM CHLORIDE 0.9 % (FLUSH) 0.9 %
10 SYRINGE (ML) INJECTION AS NEEDED
Status: DISCONTINUED | OUTPATIENT
Start: 2017-06-17 | End: 2017-06-21 | Stop reason: HOSPADM

## 2017-06-17 RX ORDER — PANTOPRAZOLE SODIUM 40 MG/1
40 TABLET, DELAYED RELEASE ORAL EVERY MORNING
Status: DISCONTINUED | OUTPATIENT
Start: 2017-06-18 | End: 2017-06-21 | Stop reason: HOSPADM

## 2017-06-17 RX ORDER — LEVOFLOXACIN 5 MG/ML
750 INJECTION, SOLUTION INTRAVENOUS ONCE
Status: COMPLETED | OUTPATIENT
Start: 2017-06-17 | End: 2017-06-17

## 2017-06-17 RX ORDER — SODIUM CHLORIDE 9 MG/ML
125 INJECTION, SOLUTION INTRAVENOUS CONTINUOUS
Status: DISCONTINUED | OUTPATIENT
Start: 2017-06-17 | End: 2017-06-21 | Stop reason: HOSPADM

## 2017-06-17 RX ORDER — ALBUTEROL SULFATE 2.5 MG/3ML
2.5 SOLUTION RESPIRATORY (INHALATION) ONCE AS NEEDED
Status: COMPLETED | OUTPATIENT
Start: 2017-06-17 | End: 2017-06-19

## 2017-06-17 RX ORDER — SODIUM CHLORIDE FOR INHALATION 7 %
4 VIAL, NEBULIZER (ML) INHALATION 4 TIMES DAILY
Status: DISCONTINUED | OUTPATIENT
Start: 2017-06-17 | End: 2017-06-17

## 2017-06-17 RX ORDER — SODIUM CHLORIDE FOR INHALATION 7 %
4 VIAL, NEBULIZER (ML) INHALATION
Status: DISCONTINUED | OUTPATIENT
Start: 2017-06-17 | End: 2017-06-21 | Stop reason: HOSPADM

## 2017-06-17 RX ORDER — METHYLPREDNISOLONE SODIUM SUCCINATE 125 MG/2ML
125 INJECTION, POWDER, LYOPHILIZED, FOR SOLUTION INTRAMUSCULAR; INTRAVENOUS ONCE
Status: COMPLETED | OUTPATIENT
Start: 2017-06-17 | End: 2017-06-17

## 2017-06-17 RX ORDER — CYCLOBENZAPRINE HCL 10 MG
10 TABLET ORAL 3 TIMES DAILY PRN
Status: DISCONTINUED | OUTPATIENT
Start: 2017-06-17 | End: 2017-06-21 | Stop reason: HOSPADM

## 2017-06-17 RX ORDER — SODIUM CHLORIDE FOR INHALATION 7 %
4 VIAL, NEBULIZER (ML) INHALATION ONCE AS NEEDED
Status: DISCONTINUED | OUTPATIENT
Start: 2017-06-17 | End: 2017-06-21 | Stop reason: HOSPADM

## 2017-06-17 RX ORDER — LEVOFLOXACIN 750 MG/1
750 TABLET ORAL EVERY 24 HOURS
Status: DISCONTINUED | OUTPATIENT
Start: 2017-06-18 | End: 2017-06-21 | Stop reason: HOSPADM

## 2017-06-17 RX ADMIN — ACETAMINOPHEN 975 MG: 325 TABLET ORAL at 16:01

## 2017-06-17 RX ADMIN — IPRATROPIUM BROMIDE AND ALBUTEROL SULFATE 3 ML: .5; 3 SOLUTION RESPIRATORY (INHALATION) at 23:29

## 2017-06-17 RX ADMIN — Medication 4 ML: at 23:34

## 2017-06-17 RX ADMIN — CYCLOBENZAPRINE HYDROCHLORIDE 10 MG: 10 TABLET, FILM COATED ORAL at 23:18

## 2017-06-17 RX ADMIN — SODIUM CHLORIDE 125 ML/HR: 9 INJECTION, SOLUTION INTRAVENOUS at 16:38

## 2017-06-17 RX ADMIN — ALBUTEROL SULFATE 2.5 MG: 2.5 SOLUTION RESPIRATORY (INHALATION) at 17:53

## 2017-06-17 RX ADMIN — ALBUTEROL SULFATE 2.5 MG: 2.5 SOLUTION RESPIRATORY (INHALATION) at 16:05

## 2017-06-17 RX ADMIN — ENOXAPARIN SODIUM 40 MG: 40 INJECTION SUBCUTANEOUS at 22:50

## 2017-06-17 RX ADMIN — LEVOFLOXACIN 750 MG: 5 INJECTION, SOLUTION INTRAVENOUS at 23:19

## 2017-06-17 RX ADMIN — ALBUTEROL SULFATE 2.5 MG: 2.5 SOLUTION RESPIRATORY (INHALATION) at 17:50

## 2017-06-17 RX ADMIN — METHYLPREDNISOLONE SODIUM SUCCINATE 125 MG: 125 INJECTION, POWDER, FOR SOLUTION INTRAMUSCULAR; INTRAVENOUS at 22:50

## 2017-06-17 RX ADMIN — ALBUTEROL SULFATE 2.5 MG: 2.5 SOLUTION RESPIRATORY (INHALATION) at 15:59

## 2017-06-17 RX ADMIN — ROPINIROLE 1 MG: 1 TABLET, FILM COATED ORAL at 22:50

## 2017-06-17 NOTE — H&P
Patient Care Team:  Erich Aviles MD as PCP - General (Internal Medicine)  Erich Aviles MD as PCP - Claims Attributed    Chief complaint:  Chills    History of present illness:  This is a 77 year old male patient known to have COPD and bronchiectasis who was admitted earlier this month on 6/1/17 for acute exacerbation of bronchiectasis. He was discharged on 6/3/17 with 5 days course of prednisone and to complete his doxycycline antibiotics.  He also had a prior admission in May for similar episode with hemoptysis, hypoxemia and acute kidney injury and was discharged on May 5th.    Patient reported acute onset of chills which occurred this afternoon around 2:30 PM.  He did not check his temperature.  He called his son who transferred him to the emergency department.  He reported associated cough which is at baseline and not productive.  He does have bronchiectasis as stated above and uses incentive spirometry at home.  He's also on albuterol nebulizer which he uses this average of twice a day.  He denies worsening shortness of breath.  He denies sick contact or recent travel.    On arrival to the emergency department, he was noted to have normal SPO2 of 94% on room air.  However he had significant  rhonchi and wheezing on exam as reported by the ED physician which did not resolve after nebulizer therapy.  Temperature was 101.1°F and heart rate was up to 116.      Labs:  WBC = 18.7 K (was 23 on 6/2/2017); hemoglobin = 13.1; creatinine = 0.9; serum bicarbonate = 26,: Lactic acid normal    Review of Systems:  Constitutional: Chills as reported above.  He has not checked his temperature  ENMT: No attempts sinus congestion.  Not currently present  Cardiovascular: No chest pain, palpitation but intermittent leg swelling which increases when he lays flat  Respiratory: No dyspnea at rest.  Intermittent cough which is currently better but increases when he takes a deep breath.  No  wheezing  Gastrointestinal: No constipation, diarrhea or abdominal pain but reported some stool and urine incontinence  Neurology: No headache, weakness, numbness or dizziness.   Musculoskeletal: Chronic joint pain but no swelling or stiffness.   Psychiatry: No depression.  Lymphatic: No swollen glands.  Integumentary: No rash.    History  Past Medical History:   Diagnosis Date   • ADHD (attention deficit hyperactivity disorder)    • Colon polyp    • COPD (chronic obstructive pulmonary disease)    • Diverticulosis    • On home oxygen therapy    • Pneumonia      Past Surgical History:   Procedure Laterality Date   • BRONCHOSCOPY N/A 4/30/2016    Procedure: BRONCHOSCOPY with BAL of right lower lobe and left  lower lobe.  ;  Surgeon: Nando Diaz MD;  Location:  [facility];  Service:    • BRONCHOSCOPY N/A 4/28/2017    Procedure: BRONCHOSCOPY;  Surgeon: Katharina Salter MD;  Location:  [facility];  Service:    • COLONOSCOPY  05/17/2013    eh, ih, tort, sig tics   • ENDOSCOPY  03/19/2015    z line irreg, hh   • HERNIA REPAIR     • SPINE SURGERY     • TONSILLECTOMY       Family History   Problem Relation Age of Onset   • Thyroid disease Mother    • No Known Problems Father      Social History   Substance Use Topics   • Smoking status: Never Smoker   • Smokeless tobacco: Never Used   • Alcohol use Yes      Comment: red wine occas     Prescriptions Prior to Admission   Medication Sig Dispense Refill Last Dose   • albuterol (PROVENTIL) (2.5 MG/3ML) 0.083% nebulizer solution Use with nebulizer twice a day with aerobika device and 7% saline 125 vial 11 Taking   • benzonatate (TESSALON) 200 MG capsule Take 1 capsule by mouth 2 (Two) Times a Day As Needed for Cough. 60 capsule 2    • clotrimazole-betamethasone (LOTRISONE) 1-0.05 % cream Apply  topically 2 (Two) Times a Day. 45 g 0 Taking   • cyclobenzaprine (FLEXERIL) 10 MG tablet Take 1 tablet by mouth 3 (Three) Times a Day As Needed for Muscle Spasms. Do not  drive 30 tablet 2    • dextromethorphan-guaifenesin (ROBITUSSIN-DM)  MG/5ML syrup Take 5 mL by mouth Every 12 (Twelve) Hours.   Taking   • doxycycline (MONODOX) 100 MG capsule Take 1 capsule by mouth Every 12 (Twelve) Hours. Indications: Upper Respiratory Tract Infection 14 capsule 0    • esomeprazole (NexIUM) 40 MG capsule Take 1 capsule by mouth every morning before breakfast. (Patient taking differently: Take 40 mg by mouth Every Morning Before Breakfast. As needed) 90 capsule 3 Taking   • FLUoxetine (PROzac) 60 MG tablet Take 1 tablet by mouth Daily. (Patient taking differently: Take 60 mg by mouth Daily As Needed.) 30 tablet 3 Not Taking   • glucosamine-chondroitin 500-400 MG capsule capsule Take  by mouth 3 (Three) Times a Day With Meals.   Taking   • guaiFENesin (MUCINEX) 600 MG 12 hr tablet Take 2 tablets by mouth 2 (Two) Times a Day. 120 tablet 3 Taking   • methylphenidate (RITALIN) 10 MG tablet 1 po qday ADD 30 tablet 0    • Misc Natural Products (NF FORMULAS TESTOSTERONE PO) Take  by mouth Daily.   Taking   • Multiple Vitamins-Minerals (MULTIVITAMIN ADULT PO) Take  by mouth Daily.   Taking   • rOPINIRole (REQUIP) 1 MG tablet Take 1 mg by mouth Every Night. Take 1 hour before bedtime.   Taking   • sodium chloride 7 % nebulizer solution nebulizer solution Take 4 mL by nebulization 2 (Two) Times a Day. 240 mL 1 Taking   • Umeclidinium-Vilanterol (ANORO ELLIPTA) 62.5-25 MCG/INH aerosol powder  Inhale 62.5 mcg Daily. 3 each 1 Taking     Allergies:  Azithromycin; Latex; and Sulfa antibiotics    Objective     Vital Signs  Temp:  [100.4 °F (38 °C)-101.1 °F (38.4 °C)] 100.4 °F (38 °C)  Heart Rate:  [] 102  Resp:  [16-18] 16  BP: (117-128)/(58-63) 123/58    Physical Exam:  Constitutional: Not in acute distress.  Eyes: Injected conjunctiva, EOMI.  ENMT: Mile 2. No oral thrush.   Heart: RRR, no murmur  Lungs: Slightly decreased air entry bilaterally with bilateral coarse breaths sounds.  I did not  hear any wheezing or crackles, though patient has received nebulizer in the ED     Abdomen: Soft. No tenderness or dullness.  Extremities: No cyanosis, clubbing but grade II-III pitting edema. Moves all extremities.  Neuro: Conscious, alert, oriented x3  Psych: Appropriate mood and affect.    Integumentary: No rash  Lymphatic: No palpable cervical or supraclavicular lymph nodes.            Diagnostic imaging:  I personally and independently reviewed the following images:   Left lower lobe infiltrates which seems to be slightly worse compared to prior examination but this could be related to the lower lung volume        CT dated 5/31/17: Mild bronchiectasis with left lower lobe infiltrates.        Assessment and Plan:    1) Acute exacerbation of bronchiectasis:   - Start Levofloxacin. Discussed with ED physician  - Hypertonic saline every 6 hours  - Bronchodilators every 6 hours  - Check sputum culture  - Consider chronic suppressive antibiotic therapy with doxycycline or azithromycin (this is the third exacerbation over the last 2 months)    2) fever, secondary to #1 vs pneumonia: Culture obtained   - check Procal    3) COPD:   Start bronchodilators as above.   Hold off Steroid now given the lack of wheezing.     4) leukocytosis: 2ary to #1 versus steroid use (though he finished steroids about 10 days ago)    5) RLS: resumed Requip    6) ADD: resumed Methylphenidate    7) DVT prophylaxis: Lovenox          I discussed the patients findings and my recommendations with patient and nursing staff.     Nikki Parker MD  06/17/17  5:45 PM          EMR Dragon/Transcription disclaimer:   Much of this encounter note is an electronic transcription/translation of spoken language to printed text. The electronic translation of spoken language may permit erroneous, or at times, nonsensical words or phrases to be inadvertently transcribed; Although I have reviewed the note for such errors, some may still exist.

## 2017-06-17 NOTE — ED PROVIDER NOTES
EMERGENCY DEPARTMENT ENCOUNTER    CHIEF COMPLAINT  Chief Complaint: fever, generalized body aches  History given by: patient, family  History limited by: nothing  Room Number: 28/28  PMD: Erich Aviles MD  Pulmonologist: Dr. Salter    HPI:  Pt is a 77 y.o. male, with hx of COPD and bronchiectasis, who presents complaining of fever (101.7) with rigors onset today. Pt also c/o generalized myalgias and arthralgias, worse in his bilateral hips, for several days. Pt's family states the Pt has had chronic bilateral hip pain in the past, especially with movement. He feels the same as last time he came in a couple of weeks ago with pneumonia. Pt also c/o worsening productive cough with yellow sputum. Pt denies vomiting and diarrhea. Pt states is normally on 2L O2 at home.   Pt states he was recently d/c from the hospital, after admission for pneumonia, with doxycycline which he states he finished as directed. Pt denies hx of aspiration pneumonia. Cough progressevely worsening the past several days. The shakings was today just PTA here.    Duration:  2 hours  Onset: gradual  Timing: constant  Intensity/Severity: moderate  Progression: unchanged  Associated Symptoms: cough, myalgias, arthralgias  Aggravating Factors: none  Alleviating Factors: none  Previous Episodes: Pt was recently admitted for pneumonia and states these sx feel similar   Treatment before arrival: none    PAST MEDICAL HISTORY  Active Ambulatory Problems     Diagnosis Date Noted   • Thrush, oral 03/11/2016   • ADD (attention deficit disorder) 03/11/2016   • Hemorrhoids 03/11/2016   • Bronchiectasis with acute lower respiratory infection 03/11/2016   • Diverticul disease small and large intestine, no perforati or abscess 03/11/2016   • Benign non-nodular prostatic hyperplasia without lower urinary tract symptoms 03/11/2016   • Gastroesophageal reflux disease 03/11/2016   • Malaise and fatigue 03/11/2016   • Environmental allergies 04/25/2016   • Hemoptysis  04/27/2016   • Dyslipidemia 05/11/2016   • Primary osteoarthritis involving multiple joints 05/11/2016   • Pneumonia of left upper lobe due to infectious organism 10/03/2016   • Abnormal EKG 10/04/2016   • Chronic respiratory failure with hypoxia 10/04/2016   • COPD (chronic obstructive pulmonary disease) 10/04/2016   • Pneumonia of left lower lobe due to infectious organism 03/26/2017   • Mild malnutrition 03/28/2017   • Acute respiratory failure with hypoxia 04/27/2017   • Bronchitis 06/01/2017     Resolved Ambulatory Problems     Diagnosis Date Noted   • Pneumonia of both lower lobes due to infectious organism 03/11/2016   • Pneumonia of right upper lobe due to infectious organism 04/22/2016   • COPD exacerbation 04/25/2016   • GERD (gastroesophageal reflux disease) 04/25/2016   • Bacterial lobar pneumonia 12/27/2016     Past Medical History:   Diagnosis Date   • ADHD (attention deficit hyperactivity disorder)    • Colon polyp    • COPD (chronic obstructive pulmonary disease)    • Diverticulosis    • On home oxygen therapy    • Pneumonia        PAST SURGICAL HISTORY  Past Surgical History:   Procedure Laterality Date   • BRONCHOSCOPY N/A 4/30/2016    Procedure: BRONCHOSCOPY with BAL of right lower lobe and left  lower lobe.  ;  Surgeon: Nando Diaz MD;  Location: [facility];  Service:    • BRONCHOSCOPY N/A 4/28/2017    Procedure: BRONCHOSCOPY;  Surgeon: Katharina Salter MD;  Location[facility];  Service:    • COLONOSCOPY  05/17/2013    eh, ih, tort, sig tics   • ENDOSCOPY  03/19/2015    z line irreg, hh   • HERNIA REPAIR     • SPINE SURGERY     • TONSILLECTOMY         FAMILY HISTORY  Family History   Problem Relation Age of Onset   • Thyroid disease Mother    • No Known Problems Father        SOCIAL HISTORY  Social History     Social History   • Marital status: Single     Spouse name: N/A   • Number of children: N/A   • Years of education: N/A     Occupational History   • Not on file.      Social History Main Topics   • Smoking status: Never Smoker   • Smokeless tobacco: Never Used   • Alcohol use Yes      Comment: red wine occas   • Drug use: No   • Sexual activity: Defer     Other Topics Concern   • Not on file     Social History Narrative       ALLERGIES  Azithromycin; Latex; and Sulfa antibiotics    REVIEW OF SYSTEMS  Review of Systems   Constitutional: Positive for fever (with rigors). Negative for chills.   HENT: Negative for sore throat and trouble swallowing.    Eyes: Negative for visual disturbance.   Respiratory: Positive for cough (productive). Negative for shortness of breath.    Cardiovascular: Negative for chest pain and leg swelling.   Gastrointestinal: Negative for abdominal pain, diarrhea and vomiting.   Genitourinary: Negative for decreased urine volume and frequency.   Musculoskeletal: Positive for arthralgias (bilateral hips) and myalgias (generalized). Negative for neck pain.   Skin: Negative for rash.   Neurological: Negative for weakness and numbness.   All other systems reviewed and are negative.      PHYSICAL EXAM  ED Triage Vitals   Temp Heart Rate Resp BP SpO2   06/17/17 1453 06/17/17 1453 06/17/17 1453 06/17/17 1503 06/17/17 1453   101.1 °F (38.4 °C) 116 18 126/63 94 %      Temp src Heart Rate Source Patient Position BP Location FiO2 (%)   06/17/17 1453 -- 06/17/17 1503 06/17/17 1503 --   Tympanic  Lying Left arm        Physical Exam   Constitutional: He is oriented to person, place, and time and well-developed, well-nourished, and in no distress. He appears unhealthy.   HENT:   Head: Normocephalic and atraumatic.   Eyes: EOM are normal. Pupils are equal, round, and reactive to light.   Neck: Normal range of motion. Neck supple.   Cardiovascular: Regular rhythm and normal heart sounds.  Tachycardia present.    Pulmonary/Chest: Effort normal. No respiratory distress. He has rhonchi (bilateral bases).   Abdominal: Soft. There is no tenderness. There is no rebound and no  guarding.   Musculoskeletal: Normal range of motion. He exhibits no edema.   Neurological: He is alert and oriented to person, place, and time. He has normal sensation and normal strength.   Skin: Skin is warm and dry.   Psychiatric: Mood and affect normal.   Nursing note and vitals reviewed.      LAB RESULTS  Lab Results (last 24 hours)     Procedure Component Value Units Date/Time    Urinalysis With / Culture If Indicated [[id number]]  (Normal) Collected:  06/17/17 1540    Specimen:  Urine from Urine, Clean Catch Updated:  06/17/17 1614     Color, UA Yellow     Appearance, UA Clear     pH, UA 6.5     Specific Gravity, UA 1.020     Glucose, UA Negative     Ketones, UA Negative     Bilirubin, UA Negative     Blood, UA Negative     Protein, UA Negative     Leuk Esterase, UA Negative     Nitrite, UA Negative     Urobilinogen, UA 0.2 E.U./dL    Narrative:       Urine microscopic not indicated.    CBC & Differential [[id number]] Collected:  06/17/17 1624    Specimen:  Blood Updated:  06/17/17 1634    Narrative:       The following orders were created for panel order CBC & Differential.  Procedure                               Abnormality         Status                     ---------                               -----------         ------                     CBC Auto Differential[[id number]]        Abnormal            Final result                 Please view results for these tests on the individual orders.    Comprehensive Metabolic Panel [[id number]] Collected:  06/17/17 1624    Specimen:  Blood Updated:  06/17/17 1700     Glucose 99 mg/dL      BUN 18 mg/dL      Creatinine 0.97 mg/dL      Sodium 137 mmol/L      Potassium 3.8 mmol/L      Chloride 98 mmol/L      CO2 26.0 mmol/L      Calcium 8.9 mg/dL      Total Protein 6.4 g/dL      Albumin 3.70 g/dL      ALT (SGPT) 16 U/L      AST (SGOT) 20 U/L      Alkaline Phosphatase 59 U/L      Total Bilirubin 0.6 mg/dL      eGFR Non African Amer 75 mL/min/1.73      Globulin 2.7 gm/dL       A/G Ratio 1.4 g/dL      BUN/Creatinine Ratio 18.6     Anion Gap 13.0 mmol/L     Narrative:       The MDRD GFR formula is only valid for adults with stable renal function between ages 18 and 70.    Protime-INR [[id number]]  (Normal) Collected:  06/17/17 1624    Specimen:  Blood Updated:  06/17/17 1645     Protime 13.0 Seconds      INR 1.02    BNP [[id number]]  (Normal) Collected:  06/17/17 1624    Specimen:  Blood Updated:  06/17/17 1657     proBNP 186.1 pg/mL     Narrative:       Among patients with dyspnea, NT-proBNP is highly sensitive for the detection of acute congestive heart failure. In addition NT-proBNP of <300 pg/ml effectively rules out acute congestive heart failure with 99% negative predictive value.    Lipase [[id number]]  (Normal) Collected:  06/17/17 1624    Specimen:  Blood Updated:  06/17/17 1700     Lipase 17 U/L     CK [[id number]]  (Normal) Collected:  06/17/17 1624    Specimen:  Blood Updated:  06/17/17 1700     Creatine Kinase 83 U/L     Troponin [[id number]]  (Normal) Collected:  06/17/17 1624    Specimen:  Blood Updated:  06/17/17 1700     Troponin T <0.010 ng/mL     Narrative:       Troponin T Reference Ranges:  Less than 0.03 ng/mL:    Negative for AMI  0.03 to 0.09 ng/mL:      Indeterminant for AMI  Greater than 0.09 ng/mL: Positive for AMI    Lactic Acid, Plasma [[id number]]  (Normal) Collected:  06/17/17 1624    Specimen:  Blood Updated:  06/17/17 1653     Lactate 1.1 mmol/L     CBC Auto Differential [[id number]]  (Abnormal) Collected:  06/17/17 1624    Specimen:  Blood Updated:  06/17/17 1634     WBC 18.71 (H) 10*3/mm3      RBC 4.19 (L) 10*6/mm3      Hemoglobin 13.1 (L) g/dL      Hematocrit 38.9 (L) %      MCV 92.8 fL      MCH 31.3 pg      MCHC 33.7 g/dL      RDW 14.2 %      RDW-SD 48.5 fl      MPV 9.6 fL      Platelets 200 10*3/mm3      Neutrophil % 82.3 (H) %      Lymphocyte % 7.6 (L) %      Monocyte % 7.8 %      Eosinophil % 1.8 %      Basophil % 0.2 %      Immature Grans % 0.3 %       Neutrophils, Absolute 15.40 (H) 10*3/mm3      Lymphocytes, Absolute 1.42 10*3/mm3      Monocytes, Absolute 1.46 (H) 10*3/mm3      Eosinophils, Absolute 0.34 10*3/mm3      Basophils, Absolute 0.03 10*3/mm3      Immature Grans, Absolute 0.06 (H) 10*3/mm3     Blood Culture [[id number]] Collected:  06/17/17 1628    Specimen:  Blood from Arm, Left Updated:  06/17/17 1630    Blood Culture [[id number]] Collected:  06/17/17 1658    Specimen:  Blood from Arm, Right Updated:  06/17/17 1702      I ordered the above labs and reviewed the results    RADIOLOGY  XR Chest 2 View   Final Result      CXR: Chronic interstitial changes  I ordered the above noted radiological studies. Interpreted by radiologist. Reviewed by me in PACS.       PROCEDURES  Procedures  EKG         EKG time: 16:11  Rhythm/Rate: NSR, rate 101  P waves and MS: normal  QRS, axis: narrow QRS, normal axis   ST and T waves: diffuse, non specific ST and T waves changes     Interpreted Contemporaneously by me, independently viewed  Unchanged compared to prior 5/3/1/17      PROGRESS AND CONSULTS  ED Course     3:19 PM  Discussed with Pt/family that Pt will likely be admitted due to age and recent hospital admission for pneumonia with COPD and bronchiectasis. All questions and concerns addressed at this time.    3:27 PM  Ordered labs, CXR, and EKG for further evaluation. Ordered albuterol breathing treatment.     5:03 PM  Consult placed to pulmonology for admission.     5:32 PM  Pt rechecked and is resting comfortably. BP- 117/61 HR- 102 Temp- 100.4 °F (38 °C) O2 sat- 94%. All pertinent lab/imaging/EKG findings discussed including no obvious pneumonia seen on CXR. Pt/family verbalized understanding and agree with plan to admit for abx treatment. All questions and concerns addressed at this time.     5:45 PM  Discussed Pt's case with Dr. Parker (pulmonolgist) who agrees to admit and request the Pt be given Levaquin.         MEDICAL DECISION MAKING  Results were  reviewed/discussed with the patient and they were also made aware of online access. Pt also made aware that some labs, such as cultures, will not be resulted during ER visit and follow up with PMD is necessary.     MDM  Number of Diagnoses or Management Options  Bronchiectasis with acute exacerbation:   COPD exacerbation:   Leukocytosis, unspecified type:      Amount and/or Complexity of Data Reviewed  Clinical lab tests: ordered and reviewed (WBC: 18.71)  Tests in the radiology section of CPT®: ordered and reviewed (CXR: Chronic interstitial changes)  Tests in the medicine section of CPT®: ordered and reviewed (See note)  Decide to obtain previous medical records or to obtain history from someone other than the patient: yes  Review and summarize past medical records: yes (Pt was d/c from this facility 6/3/17 after admission for acute exacerabation of bronchiectasis and COPD. Pt was treated with abx and d/c with doxycycline and steroids.   Pt was d/c 5/5/17 after admission for similar sx. Pt saw Dr. Salter (pulmonolgist) at that time.   Pt was d/c 3/28/17 after admission for left lower lobe pneumonia. )  Discuss the patient with other providers: yes (Dr. Parker (pulmonolgist))  Independent visualization of images, tracings, or specimens: yes    Patient Progress  Patient progress: stable         DIAGNOSIS  Final diagnoses:   COPD exacerbation   Bronchiectasis with acute exacerbation   Leukocytosis, unspecified type       DISPOSITION  ADMISSION: Dr. Parker     Discussed treatment plan and reason for admission with pt/family and admitting physician.  Pt/family voiced understanding of the plan for admission for further testing/treatment as needed.       Latest Documented Vital Signs:  As of 5:53 PM  BP- 123/58 HR- 94 Temp- 100.4 °F (38 °C) O2 sat- 94%    --  Documentation assistance provided by shan Manriquez for Dr. Gallegos.  Information recorded by the shan was done at my direction and has been verified  and validated by me.            Génesis Manriquez  06/17/17 [id number]       Nando Gallegos MD  06/17/17 0124

## 2017-06-18 PROCEDURE — 94799 UNLISTED PULMONARY SVC/PX: CPT

## 2017-06-18 PROCEDURE — 25010000002 ENOXAPARIN PER 10 MG: Performed by: INTERNAL MEDICINE

## 2017-06-18 RX ADMIN — ROPINIROLE 1 MG: 1 TABLET, FILM COATED ORAL at 20:39

## 2017-06-18 RX ADMIN — PANTOPRAZOLE SODIUM 40 MG: 40 TABLET, DELAYED RELEASE ORAL at 05:35

## 2017-06-18 RX ADMIN — METHYLPHENIDATE HYDROCHLORIDE 10 MG: 5 TABLET ORAL at 09:45

## 2017-06-18 RX ADMIN — Medication 4 ML: at 17:09

## 2017-06-18 RX ADMIN — LEVOFLOXACIN 750 MG: 750 TABLET, FILM COATED ORAL at 18:15

## 2017-06-18 RX ADMIN — IPRATROPIUM BROMIDE AND ALBUTEROL SULFATE 3 ML: .5; 3 SOLUTION RESPIRATORY (INHALATION) at 17:09

## 2017-06-18 RX ADMIN — Medication 4 ML: at 08:58

## 2017-06-18 RX ADMIN — Medication 4 ML: at 21:01

## 2017-06-18 RX ADMIN — IPRATROPIUM BROMIDE AND ALBUTEROL SULFATE 3 ML: .5; 3 SOLUTION RESPIRATORY (INHALATION) at 20:53

## 2017-06-18 RX ADMIN — Medication 4 ML: at 13:07

## 2017-06-18 RX ADMIN — SODIUM CHLORIDE 125 ML/HR: 9 INJECTION, SOLUTION INTRAVENOUS at 12:12

## 2017-06-18 RX ADMIN — IPRATROPIUM BROMIDE AND ALBUTEROL SULFATE 3 ML: .5; 3 SOLUTION RESPIRATORY (INHALATION) at 13:07

## 2017-06-18 RX ADMIN — ENOXAPARIN SODIUM 40 MG: 40 INJECTION SUBCUTANEOUS at 09:45

## 2017-06-18 RX ADMIN — IPRATROPIUM BROMIDE AND ALBUTEROL SULFATE 3 ML: .5; 3 SOLUTION RESPIRATORY (INHALATION) at 08:57

## 2017-06-18 NOTE — PROGRESS NOTES
"                                              LOS: 1 day   Patient Care Team:  Erich Aviles MD as PCP - General (Internal Medicine)  Erich Aviles MD as PCP - Claims Attributed    Chief Complaint:  F/up on fever, bronchiectasis, COPD    Interval History:   He feels better. Sputum is more productive. Sputum is yellowish. No fever or chills since yesterday.      REVIEW OF SYSTEMS:   CARDIOVASCULAR: No chest pain, chest pressure or chest discomfort. No palpitations. +Ve edema.   RESPIRATORY: No shortness of breath at rest.   GASTROINTESTINAL: No anorexia, nausea, vomiting or diarrhea. No abdominal pain or blood.   HEMATOLOGIC: No bleeding or bruising.     Ventilator/Non-Invasive Ventilation Settings     None            Vital Signs  Temp:  [96.7 °F (35.9 °C)-99 °F (37.2 °C)] 97.3 °F (36.3 °C)  Heart Rate:  [] 100  Resp:  [16-20] 16  BP: (100-123)/(50-80) 100/56    Intake/Output Summary (Last 24 hours) at 06/18/17 1726  Last data filed at 06/18/17 1500   Gross per 24 hour   Intake             2340 ml   Output             2350 ml   Net              -10 ml     Flowsheet Rows         First Filed Value    Admission Height  71\" (180.3 cm) Documented at 06/17/2017 1454    Admission Weight  170 lb (77.1 kg) Documented at 06/17/2017 1454          Physical Exam:   General Appearance:    Alert, cooperative, in no acute distress   Lungs:     Decreased breath sounds bilaterally with coarse breath sounds. No wheezing or crackles.     Heart:    Regular rhythm and normal rate, normal S1 and S2, no            murmur, no gallop, no rub, no click   Chest Wall:    No abnormalities observed   Abdomen:     Normal bowel sounds, no masses, no organomegaly, soft        non-tender, non-distended, no guarding, no rebound                tenderness   Neuro:   Conscious, alert, oriented x3   Extremities:   Moves all extremities well, grade II pitting  edema, no cyanosis, no Redness          Results Review:          Results from last 7 " days  Lab Units 06/17/17  1624   SODIUM mmol/L 137   POTASSIUM mmol/L 3.8   CHLORIDE mmol/L 98   TOTAL CO2 mmol/L 26.0   BUN mg/dL 18   CREATININE mg/dL 0.97   GLUCOSE mg/dL 99   CALCIUM mg/dL 8.9       Results from last 7 days  Lab Units 06/17/17  1624   CK TOTAL U/L 83   TROPONIN T ng/mL <0.010       Results from last 7 days  Lab Units 06/17/17  1624   WBC 10*3/mm3 18.71*   HEMOGLOBIN g/dL 13.1*   HEMATOCRIT % 38.9*   PLATELETS 10*3/mm3 200       Results from last 7 days  Lab Units 06/17/17  1624   INR  1.02         @LABNT(bnp)@  I reviewed the patient's new clinical results.  I personally viewed and interpreted the patient's CXR        Medication Review:     enoxaparin 40 mg Subcutaneous Daily   ipratropium-albuterol 3 mL Nebulization 4x Daily - RT   levoFLOXacin 750 mg Oral Q24H   methylphenidate 10 mg Oral Daily   pantoprazole 40 mg Oral QAM   rOPINIRole 1 mg Oral Nightly   sodium chloride 4 mL Nebulization 4x Daily - RT         sodium chloride 125 mL/hr Last Rate: Stopped (06/18/17 1213)            Assessment and Plan:     1) Pneumonia, LLL likley:  - Continue Levofloxacin day 2  - Sputum culture obtained yesterday  - CBCD in AM    2) Bronchiectasis with possible exacerbation:  - Hypertonic saline every 6 hours  - Bronchodilators every 6 hours  - Consider chronic suppressive antibiotic therapy with doxycycline or azithromycin (this is the third exacerbation over the last 2 months)  - Add flutter device     3) COPD:   Start bronchodilators as above.   Hold off Steroid now given the lack of wheezing.      4) RLS: resumed Requip     5) ADD: resumed Methylphenidate     6) DVT prophylaxis: Rafa Parker MD  06/18/17  5:26 PM            EMR Dragon/Transcription disclaimer:   Much of this encounter note is an electronic transcription/translation of spoken language to printed text. The electronic translation of spoken language may permit erroneous, or at times, nonsensical words or phrases to be  inadvertently transcribed; Although I have reviewed the note for such errors, some may still exist.

## 2017-06-18 NOTE — PLAN OF CARE
Problem: Patient Care Overview (Adult)  Goal: Plan of Care Review  Outcome: Ongoing (interventions implemented as appropriate)    06/17/17 2006   Coping/Psychosocial Response Interventions   Plan Of Care Reviewed With patient   Outcome Evaluation   Outcome Summary/Follow up Plan Admitted from ER this evening. No c/o of pain, nausea or SOA.       Goal: Adult Individualization and Mutuality  Outcome: Ongoing (interventions implemented as appropriate)  Goal: Discharge Needs Assessment  Outcome: Ongoing (interventions implemented as appropriate)    Problem: Respiratory Insufficiency (Adult)  Goal: Identify Related Risk Factors and Signs and Symptoms  Outcome: Outcome(s) achieved Date Met:  06/17/17  Goal: Acid/Base Balance  Outcome: Ongoing (interventions implemented as appropriate)  Goal: Effective Ventilation  Outcome: Ongoing (interventions implemented as appropriate)

## 2017-06-18 NOTE — PLAN OF CARE
Problem: Fall Risk (Adult)  Goal: Identify Related Risk Factors and Signs and Symptoms  Outcome: Ongoing (interventions implemented as appropriate)    06/18/17 5169   Fall Risk   Fall Risk: Related Risk Factors fear of falling;objects hard to reach;slipper/uneven surfaces   Fall Risk: Signs and Symptoms presence of risk factors

## 2017-06-18 NOTE — PLAN OF CARE
Problem: Patient Care Overview (Adult)  Goal: Plan of Care Review  Outcome: Ongoing (interventions implemented as appropriate)    06/18/17 1008 06/18/17 1936   Coping/Psychosocial Response Interventions   Plan Of Care Reviewed With patient --    Outcome Evaluation   Outcome Summary/Follow up Plan --  ABX given. Pateint c/o of leg cramps. No c/o of nausea. IV fluids.   Patient Care Overview   Progress --  improving       Goal: Adult Individualization and Mutuality  Outcome: Ongoing (interventions implemented as appropriate)  Goal: Discharge Needs Assessment  Outcome: Ongoing (interventions implemented as appropriate)    Problem: Respiratory Insufficiency (Adult)  Goal: Acid/Base Balance  Outcome: Ongoing (interventions implemented as appropriate)  Goal: Effective Ventilation  Outcome: Ongoing (interventions implemented as appropriate)    Problem: Fall Risk (Adult)  Goal: Absence of Falls  Outcome: Ongoing (interventions implemented as appropriate)

## 2017-06-19 PROCEDURE — 94799 UNLISTED PULMONARY SVC/PX: CPT

## 2017-06-19 PROCEDURE — 25010000002 ENOXAPARIN PER 10 MG: Performed by: INTERNAL MEDICINE

## 2017-06-19 RX ADMIN — ROPINIROLE 1 MG: 1 TABLET, FILM COATED ORAL at 20:23

## 2017-06-19 RX ADMIN — SODIUM CHLORIDE 125 ML/HR: 9 INJECTION, SOLUTION INTRAVENOUS at 17:43

## 2017-06-19 RX ADMIN — Medication 4 ML: at 21:47

## 2017-06-19 RX ADMIN — Medication 4 ML: at 12:16

## 2017-06-19 RX ADMIN — IPRATROPIUM BROMIDE AND ALBUTEROL SULFATE 3 ML: .5; 3 SOLUTION RESPIRATORY (INHALATION) at 15:56

## 2017-06-19 RX ADMIN — Medication 4 ML: at 15:57

## 2017-06-19 RX ADMIN — LEVOFLOXACIN 750 MG: 750 TABLET, FILM COATED ORAL at 16:27

## 2017-06-19 RX ADMIN — ENOXAPARIN SODIUM 40 MG: 40 INJECTION SUBCUTANEOUS at 09:03

## 2017-06-19 RX ADMIN — IPRATROPIUM BROMIDE AND ALBUTEROL SULFATE 3 ML: .5; 3 SOLUTION RESPIRATORY (INHALATION) at 12:15

## 2017-06-19 RX ADMIN — Medication 4 ML: at 07:58

## 2017-06-19 RX ADMIN — SODIUM CHLORIDE 125 ML/HR: 9 INJECTION, SOLUTION INTRAVENOUS at 09:13

## 2017-06-19 RX ADMIN — IPRATROPIUM BROMIDE AND ALBUTEROL SULFATE 3 ML: .5; 3 SOLUTION RESPIRATORY (INHALATION) at 21:41

## 2017-06-19 RX ADMIN — IPRATROPIUM BROMIDE AND ALBUTEROL SULFATE 3 ML: .5; 3 SOLUTION RESPIRATORY (INHALATION) at 07:57

## 2017-06-19 RX ADMIN — METHYLPHENIDATE HYDROCHLORIDE 10 MG: 5 TABLET ORAL at 09:03

## 2017-06-19 RX ADMIN — PANTOPRAZOLE SODIUM 40 MG: 40 TABLET, DELAYED RELEASE ORAL at 06:00

## 2017-06-19 RX ADMIN — ALBUTEROL SULFATE 2.5 MG: 2.5 SOLUTION RESPIRATORY (INHALATION) at 04:15

## 2017-06-19 NOTE — PLAN OF CARE
Problem: Patient Care Overview (Adult)  Goal: Plan of Care Review  Outcome: Ongoing (interventions implemented as appropriate)  Continue symptom management and comfort care.    06/18/17 2232 06/19/17 0457   Coping/Psychosocial Response Interventions   Plan Of Care Reviewed With patient --    Outcome Evaluation   Outcome Summary/Follow up Plan --  Patient needed breathing treatment overnight. No complaints of pain. IV fluids continued.   Patient Care Overview   Progress --  no change       Goal: Adult Individualization and Mutuality  Outcome: Ongoing (interventions implemented as appropriate)  Goal: Discharge Needs Assessment  Outcome: Ongoing (interventions implemented as appropriate)    Problem: Respiratory Insufficiency (Adult)  Goal: Acid/Base Balance  Outcome: Ongoing (interventions implemented as appropriate)  Goal: Effective Ventilation  Outcome: Ongoing (interventions implemented as appropriate)    Problem: Fall Risk (Adult)  Goal: Identify Related Risk Factors and Signs and Symptoms  Outcome: Ongoing (interventions implemented as appropriate)  Goal: Absence of Falls  Outcome: Ongoing (interventions implemented as appropriate)

## 2017-06-19 NOTE — PLAN OF CARE
Problem: Patient Care Overview (Adult)  Goal: Plan of Care Review  Outcome: Ongoing (interventions implemented as appropriate)    06/19/17 0457 06/19/17 0910 06/19/17 1823   Coping/Psychosocial Response Interventions   Plan Of Care Reviewed With --  patient --    Outcome Evaluation   Outcome Summary/Follow up Plan --  --  No significant changes today. No respiration distress. Will continue to monitor.   Patient Care Overview   Progress no change --  --        Goal: Adult Individualization and Mutuality  Outcome: Ongoing (interventions implemented as appropriate)  Goal: Discharge Needs Assessment  Outcome: Ongoing (interventions implemented as appropriate)    Problem: Respiratory Insufficiency (Adult)  Goal: Acid/Base Balance  Outcome: Ongoing (interventions implemented as appropriate)  Goal: Effective Ventilation  Outcome: Ongoing (interventions implemented as appropriate)    Problem: Fall Risk (Adult)  Goal: Identify Related Risk Factors and Signs and Symptoms  Outcome: Outcome(s) achieved Date Met:  06/19/17  Goal: Absence of Falls  Outcome: Ongoing (interventions implemented as appropriate)

## 2017-06-19 NOTE — PROGRESS NOTES
LOS: 2 days   Patient Care Team:  Erich Aviles MD as PCP - General (Internal Medicine)  Erich Aviles MD as PCP - Claims Attributed    Chief Complaint:  Fever cough    Subjective: Patient reports she is doing better today he is coughing a lot he is getting up is mostly just clear sticky sputum.  He's had no further fevers chills or sweats no nausea no chest pain      History taken from: Reviewed the chart and discussed with the patient    Review of Systems:         Objective     Vital Signs  Temp:  [97.2 °F (36.2 °C)-97.5 °F (36.4 °C)] 97.5 °F (36.4 °C)  Heart Rate:  [] 94  Resp:  [16-20] 16  BP: (104-124)/(53-60) 120/59  Body mass index is 24.07 kg/(m^2).    Intake/Output Summary (Last 24 hours) at 06/19/17 1617  Last data filed at 06/19/17 0912   Gross per 24 hour   Intake             2204 ml   Output             1720 ml   Net              484 ml     I/O this shift:  In: 1844 [I.V.:1844]  Out: -     Physical Exam:  General Appearance: Well-developed white male resting comfortably in bed does not appear in acute distress  Eyes: Conjunctiva are clear and anicteric  ENT: Mucous membranes are moist no erythema or exudates Mallampati 1 airway  Neck: Adenopathy or thyromegaly no jugular venous distention no hepatojugular reflux trachea midline  Lungs: Rhonchi bilaterally that clear with cough no wheezing chest expansion is symmetric and nonlabored he is able to talk in full sentences  Cardiac: Regular rate and rhythm no murmur  Abdomen: Soft nontender no palpable organomegaly or masses  : Not examined  Musc/Skel: Grossly normal  Skin: No jaundice no rashes no petechiae noted  Neuro: Alert oriented pleasant cooperative following commands grossly intact  Extremities/P Vascular: No clubbing cyanosis or edema palpable radial and dorsalis pedis pulses  MSE:  Pretty good spirits today        Labs:  WBC No results found for: WBCS   HGB Hemoglobin   Date Value Ref Range Status   06/17/2017 13.1 (L)  13.7 - 17.6 g/dL Final      HCT Hematocrit   Date Value Ref Range Status   06/17/2017 38.9 (L) 40.4 - 52.2 % Final      Platlets No results found for: LABPLAT     PT/INR:    Protime   Date Value Ref Range Status   06/17/2017 13.0 11.7 - 14.2 Seconds Final   /  INR   Date Value Ref Range Status   06/17/2017 1.02 0.90 - 1.10 Final       Sodium Sodium   Date Value Ref Range Status   06/17/2017 137 136 - 145 mmol/L Final      Potassium Potassium   Date Value Ref Range Status   06/17/2017 3.8 3.5 - 5.2 mmol/L Final      Chloride Chloride   Date Value Ref Range Status   06/17/2017 98 98 - 107 mmol/L Final      Bicarbonate No results found for: PLASMABICARB   BUN BUN   Date Value Ref Range Status   06/17/2017 18 8 - 23 mg/dL Final      Creatinine Creatinine   Date Value Ref Range Status   06/17/2017 0.97 0.76 - 1.27 mg/dL Final      Calcium Calcium   Date Value Ref Range Status   06/17/2017 8.9 8.6 - 10.5 mg/dL Final      Magnesium No results found for: MG         pH No results found for: PHART   pO2 No results found for: PO2ART   pCO2 No results found for: XEA6BCN   HCO3 No results found for: HSN0OEZ       enoxaparin 40 mg Subcutaneous Daily   ipratropium-albuterol 3 mL Nebulization 4x Daily - RT   levoFLOXacin 750 mg Oral Q24H   methylphenidate 10 mg Oral Daily   pantoprazole 40 mg Oral QAM   rOPINIRole 1 mg Oral Nightly   sodium chloride 4 mL Nebulization 4x Daily - RT       sodium chloride 125 mL/hr Last Rate: 125 mL/hr (06/19/17 0913)       Diagnostics:  Imaging Results (last 24 hours)     ** No results found for the last 24 hours. **          Chest x-ray reviewed and compared to prior May films no definite acute infiltrates some chronic scarring and changes        Assessment/Plan     Patient Active Problem List   Diagnosis Code   • Thrush, oral B37.0   • ADD (attention deficit disorder) F98.8   • Hemorrhoids K64.9   • Bronchiectasis with acute lower respiratory infection J47.0   • Diverticul disease small and large  intestine, no perforati or abscess K57.50   • Benign non-nodular prostatic hyperplasia without lower urinary tract symptoms N40.0   • Gastroesophageal reflux disease K21.9   • Malaise and fatigue R53.81, R53.83   • Environmental allergies Z91.09   • Hemoptysis R04.2   • Dyslipidemia E78.5   • Primary osteoarthritis involving multiple joints M15.0   • Pneumonia of left upper lobe due to infectious organism J18.9   • Abnormal EKG R94.31   • Chronic respiratory failure with hypoxia J96.11   • COPD (chronic obstructive pulmonary disease) J44.9   • Pneumonia of left lower lobe due to infectious organism J18.9   • Mild malnutrition E44.1   • Acute respiratory failure with hypoxia J96.01   • Bronchitis J40   • COPD exacerbation J44.1       Impression #1 acute exacerbation bronchiectasis plus minus pneumonia.  Patient is improving with Levaquin he has had several recent exacerbations and I agree he may need to be considered for chronic therapy with azithromycin or we may need to consider giving him a test type device to improve clearance.  or both  #2 COPD I agree I don't think there is much in way of acute exacerbation no wheezing I will would not push steroids at this point  #3 restless leg syndrome  #4 ADD      Plan for disposition:    Pablo Sanabria MD  06/19/17  4:17 PM    Time:

## 2017-06-20 LAB
BACTERIA SPEC RESP CULT: NORMAL
GRAM STN SPEC: NORMAL

## 2017-06-20 PROCEDURE — 94799 UNLISTED PULMONARY SVC/PX: CPT

## 2017-06-20 PROCEDURE — 25010000002 ENOXAPARIN PER 10 MG: Performed by: INTERNAL MEDICINE

## 2017-06-20 RX ORDER — HYDROCODONE BITARTRATE AND ACETAMINOPHEN 5; 325 MG/1; MG/1
1 TABLET ORAL EVERY 4 HOURS PRN
Status: DISCONTINUED | OUTPATIENT
Start: 2017-06-20 | End: 2017-06-21 | Stop reason: HOSPADM

## 2017-06-20 RX ORDER — IPRATROPIUM BROMIDE AND ALBUTEROL SULFATE 2.5; .5 MG/3ML; MG/3ML
3 SOLUTION RESPIRATORY (INHALATION) EVERY 4 HOURS PRN
Status: DISCONTINUED | OUTPATIENT
Start: 2017-06-20 | End: 2017-06-21 | Stop reason: HOSPADM

## 2017-06-20 RX ADMIN — IPRATROPIUM BROMIDE AND ALBUTEROL SULFATE 3 ML: .5; 3 SOLUTION RESPIRATORY (INHALATION) at 15:18

## 2017-06-20 RX ADMIN — IPRATROPIUM BROMIDE AND ALBUTEROL SULFATE 3 ML: .5; 3 SOLUTION RESPIRATORY (INHALATION) at 19:24

## 2017-06-20 RX ADMIN — LEVOFLOXACIN 750 MG: 750 TABLET, FILM COATED ORAL at 14:20

## 2017-06-20 RX ADMIN — ENOXAPARIN SODIUM 40 MG: 40 INJECTION SUBCUTANEOUS at 09:46

## 2017-06-20 RX ADMIN — SODIUM CHLORIDE 125 ML/HR: 9 INJECTION, SOLUTION INTRAVENOUS at 09:50

## 2017-06-20 RX ADMIN — ROPINIROLE 1 MG: 1 TABLET, FILM COATED ORAL at 20:13

## 2017-06-20 RX ADMIN — Medication: at 15:21

## 2017-06-20 RX ADMIN — IPRATROPIUM BROMIDE AND ALBUTEROL SULFATE 3 ML: .5; 3 SOLUTION RESPIRATORY (INHALATION) at 06:02

## 2017-06-20 RX ADMIN — PANTOPRAZOLE SODIUM 40 MG: 40 TABLET, DELAYED RELEASE ORAL at 09:46

## 2017-06-20 RX ADMIN — IPRATROPIUM BROMIDE AND ALBUTEROL SULFATE 3 ML: .5; 3 SOLUTION RESPIRATORY (INHALATION) at 10:42

## 2017-06-20 RX ADMIN — CYCLOBENZAPRINE HYDROCHLORIDE 10 MG: 10 TABLET, FILM COATED ORAL at 21:14

## 2017-06-20 RX ADMIN — METHYLPHENIDATE HYDROCHLORIDE 10 MG: 5 TABLET ORAL at 09:46

## 2017-06-20 RX ADMIN — Medication: at 10:45

## 2017-06-20 NOTE — PLAN OF CARE
Problem: Patient Care Overview (Adult)  Goal: Plan of Care Review  Outcome: Ongoing (interventions implemented as appropriate)  Goal: Adult Individualization and Mutuality  Outcome: Ongoing (interventions implemented as appropriate)  Goal: Discharge Needs Assessment  Outcome: Ongoing (interventions implemented as appropriate)    Problem: Respiratory Insufficiency (Adult)  Goal: Acid/Base Balance  Outcome: Ongoing (interventions implemented as appropriate)  Goal: Effective Ventilation  Outcome: Ongoing (interventions implemented as appropriate)    Problem: Fall Risk (Adult)  Goal: Absence of Falls  Outcome: Ongoing (interventions implemented as appropriate)

## 2017-06-20 NOTE — PROGRESS NOTES
Discharge Planning Assessment  [facility]     Patient Name: Tony Leonard  MRN: [id number]  Today's Date: 6/20/2017    Admit Date: 6/17/2017          Discharge Needs Assessment       06/20/17 1747    Living Environment    Lives With alone    Living Arrangements apartment    Home Accessibility bed and bath on same level    Stair Railings at Home none    Type of Financial/Environmental Concern none    Transportation Available car;family or friend will provide    Living Environment    Provides Primary Care For no one    Quality Of Family Relationships supportive    Able to Return to Prior Living Arrangements yes    Discharge Needs Assessment    Concerns To Be Addressed discharge planning concerns    Equipment Currently Used at Home oxygen;respiratory supplies;cane, straight;walker, standard    Equipment Needed After Discharge none    Discharge Facility/Level Of Care Needs home with home health    Current Discharge Risk lives alone    Discharge Disposition home healthcare service            Discharge Plan       06/20/17 1745    Case Management/Social Work Plan    Plan Home and is requesting HH services.  Referral made to [facility].    Patient/Family In Agreement With Plan yes    Additional Comments Met with patient, explained role of CCP, verified facesheet and discussed discharge planning needs. The patient resides at home alone and has assistance from family and friends if needed.  The patient has home O2 from [employer], a nebulizer, cane and walker.  The patient has an elevator to get to his 3rd floor apartment, PCP is Erich Aviles, [facility] is [facility] on [address] and he sometimes forgets to take his medication but is now developing a system that he states is working better for him and he denies any trouble affording his medications.  The patient has previously used [facility] and is requesting their services again upon d/c and referral was made to [facility] to assist.  The patient denies any SNF history and was  provided with a HH/SNF list.  CCP will follow to assist with patient's d/c home with [facility] and will follow for HH orders.  Note left for MD to write HH orders.  RUTHIE Mathias        Discharge Placement     Facility/Agency Request Status Selected? Address Phone Number Fax Number    [facility] Pending - Request Sent     [address] [id number] [phone]                Demographic Summary       06/20/17 1745    Referral Information    Admission Type inpatient    Arrived From home or self-care    Referral Source physician    Reason For Consult discharge planning    Record Reviewed medical record    Primary Care Physician Information    Name Erich Aviles MD            Functional Status       06/20/17 1746    Functional Status Current    Ambulation 0-->independent    Transferring 0-->independent    Toileting 0-->independent    Bathing 0-->independent    Dressing 0-->independent    Eating 0-->independent    Communication 0-->understands/communicates without difficulty    Swallowing (if score 2 or more for any item, consult Rehab Services) 0-->swallows foods/liquids without difficulty    Functional Status Prior    Ambulation 0-->independent    Transferring 0-->independent    Toileting 0-->independent    Bathing 0-->independent    Dressing 0-->independent    Eating 0-->independent    Communication 0-->understands/communicates without difficulty    Swallowing 0-->swallows foods/liquids without difficulty    IADL    Medications independent    Meal Preparation independent    Housekeeping independent    Laundry independent    Shopping independent    Oral Care independent            Psychosocial     None            Abuse/Neglect     None            Legal     None            Substance Abuse     None            Patient Forms       06/20/17 1745    Patient Forms    Provider Choice List Delivered    Delivered to Patient    Method of delivery In person          Yessica Dey

## 2017-06-20 NOTE — DISCHARGE PLACEMENT REQUEST
"Tony Casey (77 y.o. Male)     Date of Birth Social Security Number Address Home Phone MRN    1940  [address] [address] [phone] [id number]    Mosque Marital Status          [place] Single       Admission Date Admission Type Admitting Provider Attending Provider Department, Room/Bed    6/17/17 Emergency Nikki Parker MD Jambeih, Rami, MD [address], P486/1    Discharge Date Discharge Disposition Discharge Destination                      Attending Provider: Nikki Parker MD     Allergies:  Azithromycin, Latex, Sulfa Antibiotics    Isolation:  None   Infection:  None   Code Status:  FULL    Ht:  71\" (180.3 cm)   Wt:  172 lb 9 oz (78.3 kg)    Admission Cmt:  None   Principal Problem:  None                Active Insurance as of 6/17/2017     Primary Coverage     Payor Plan Insurance Group Employer/Plan Group    MEDICARE MEDICARE A & B      Payor Plan Address Payor Plan Phone Number Effective From Effective To    [address] [phone] 5/1/2003     [place] [address]       Subscriber Name Subscriber Birth Date Member ID       TONY CASEY 1940 [id number]           Secondary Coverage     Payor Plan Insurance Group Employer/Plan Group     FOR LIFE  FOR LIFE [employer] [id number]      Payor Plan Address Payor Plan Phone Number Effective From Effective To    [address] [phone] 3/10/2016     [place] [address]       Subscriber Name Subscriber Birth Date Member ID       TONY CASEY 1940 [id number]                 Emergency Contacts      (Rel.) Home Phone Work Phone Mobile Phone    Ricardo Casey (Son) [phone] -- [phone]    Stef Casey (Son) -- -- [phone]    Frederick Casey (Son) [phone] -- --              "

## 2017-06-20 NOTE — PLAN OF CARE
Problem: Patient Care Overview (Adult)  Goal: Plan of Care Review  Outcome: Ongoing (interventions implemented as appropriate)  Continue symptom management and comfort care.    06/19/17 0457 06/19/17 2128 06/20/17 0559   Coping/Psychosocial Response Interventions   Plan Of Care Reviewed With --  patient --    Outcome Evaluation   Outcome Summary/Follow up Plan --  --  Patient complaining of back pain related to coughing. PRN duo-Neb order obtained along with pain medication. Pt stated that he is feeling weaker rosita to all the coughing.   Patient Care Overview   Progress no change --  --        Goal: Adult Individualization and Mutuality  Outcome: Ongoing (interventions implemented as appropriate)  Goal: Discharge Needs Assessment  Outcome: Ongoing (interventions implemented as appropriate)    Problem: Respiratory Insufficiency (Adult)  Goal: Acid/Base Balance  Outcome: Ongoing (interventions implemented as appropriate)  Goal: Effective Ventilation  Outcome: Ongoing (interventions implemented as appropriate)    Problem: Fall Risk (Adult)  Goal: Absence of Falls  Outcome: Ongoing (interventions implemented as appropriate)

## 2017-06-21 VITALS
RESPIRATION RATE: 28 BRPM | HEIGHT: 71 IN | BODY MASS INDEX: 24.16 KG/M2 | OXYGEN SATURATION: 96 % | DIASTOLIC BLOOD PRESSURE: 71 MMHG | WEIGHT: 172.56 LBS | TEMPERATURE: 97.7 F | SYSTOLIC BLOOD PRESSURE: 141 MMHG | HEART RATE: 112 BPM

## 2017-06-21 LAB
DEPRECATED RDW RBC AUTO: 49.9 FL (ref 37–54)
ERYTHROCYTE [DISTWIDTH] IN BLOOD BY AUTOMATED COUNT: 14.6 % (ref 11.5–14.5)
HCT VFR BLD AUTO: 44.3 % (ref 40.4–52.2)
HGB BLD-MCNC: 14.9 G/DL (ref 13.7–17.6)
MCH RBC QN AUTO: 31.8 PG (ref 27–32.7)
MCHC RBC AUTO-ENTMCNC: 33.6 G/DL (ref 32.6–36.4)
MCV RBC AUTO: 94.7 FL (ref 79.8–96.2)
PLATELET # BLD AUTO: 251 10*3/MM3 (ref 140–500)
PMV BLD AUTO: 10 FL (ref 6–12)
PROCALCITONIN SERPL-MCNC: 0.33 NG/ML (ref 0.1–0.25)
RBC # BLD AUTO: 4.68 10*6/MM3 (ref 4.6–6)
WBC NRBC COR # BLD: 9.42 10*3/MM3 (ref 4.5–10.7)

## 2017-06-21 PROCEDURE — 94799 UNLISTED PULMONARY SVC/PX: CPT

## 2017-06-21 PROCEDURE — 84145 PROCALCITONIN (PCT): CPT | Performed by: INTERNAL MEDICINE

## 2017-06-21 PROCEDURE — 85027 COMPLETE CBC AUTOMATED: CPT | Performed by: INTERNAL MEDICINE

## 2017-06-21 PROCEDURE — 25010000002 ENOXAPARIN PER 10 MG: Performed by: INTERNAL MEDICINE

## 2017-06-21 RX ORDER — LEVOFLOXACIN 750 MG/1
750 TABLET ORAL EVERY 24 HOURS
Qty: 5 TABLET | Refills: 0 | Status: ON HOLD | OUTPATIENT
Start: 2017-06-21 | End: 2017-06-30

## 2017-06-21 RX ORDER — ALBUTEROL SULFATE 2.5 MG/3ML
SOLUTION RESPIRATORY (INHALATION)
Qty: 125 VIAL | Refills: 11 | Status: SHIPPED | OUTPATIENT
Start: 2017-06-21 | End: 2017-11-02

## 2017-06-21 RX ADMIN — PANTOPRAZOLE SODIUM 40 MG: 40 TABLET, DELAYED RELEASE ORAL at 06:16

## 2017-06-21 RX ADMIN — ENOXAPARIN SODIUM 40 MG: 40 INJECTION SUBCUTANEOUS at 08:47

## 2017-06-21 RX ADMIN — Medication 4 ML: at 12:55

## 2017-06-21 RX ADMIN — METHYLPHENIDATE HYDROCHLORIDE 10 MG: 5 TABLET ORAL at 08:47

## 2017-06-21 RX ADMIN — IPRATROPIUM BROMIDE AND ALBUTEROL SULFATE 3 ML: .5; 3 SOLUTION RESPIRATORY (INHALATION) at 12:55

## 2017-06-21 RX ADMIN — LEVOFLOXACIN 750 MG: 750 TABLET, FILM COATED ORAL at 13:21

## 2017-06-21 RX ADMIN — Medication 10 ML: at 08:48

## 2017-06-21 NOTE — PLAN OF CARE
Problem: Patient Care Overview (Adult)  Goal: Plan of Care Review  Outcome: Ongoing (interventions implemented as appropriate)  Continue symptom management and comfort care    06/19/17 0457 06/20/17 2144 06/21/17 0333   Coping/Psychosocial Response Interventions   Plan Of Care Reviewed With --  patient --    Outcome Evaluation   Outcome Summary/Follow up Plan --  --  No complaints of pain this shift. Patient rested well. Possible D/C in AM   Patient Care Overview   Progress no change --  --        Goal: Adult Individualization and Mutuality  Outcome: Ongoing (interventions implemented as appropriate)  Goal: Discharge Needs Assessment  Outcome: Ongoing (interventions implemented as appropriate)    Problem: Respiratory Insufficiency (Adult)  Goal: Acid/Base Balance  Outcome: Ongoing (interventions implemented as appropriate)  Goal: Effective Ventilation  Outcome: Ongoing (interventions implemented as appropriate)    Problem: Fall Risk (Adult)  Goal: Absence of Falls  Outcome: Ongoing (interventions implemented as appropriate)

## 2017-06-21 NOTE — PROGRESS NOTES
LOS: 4 days   Patient Care Team:  Erich Aviles MD as PCP - General (Internal Medicine)  Erich Aviles MD as PCP - Claims Attributed    Chief Complaint:  Fever cough    Subjective: Still some cough and a little bit of pale yellow mucus no hemoptysis    Review of Systems:         Objective     Vital Signs  Temp:  [97.1 °F (36.2 °C)-97.7 °F (36.5 °C)] 97.7 °F (36.5 °C)  Heart Rate:  [] 70  Resp:  [12-20] 12  BP: ()/(54-71) 141/71  Body mass index is 24.07 kg/(m^2).    Intake/Output Summary (Last 24 hours) at 06/21/17 1112  Last data filed at 06/21/17 0912   Gross per 24 hour   Intake             1080 ml   Output                0 ml   Net             1080 ml     I/O this shift:  In: 360 [P.O.:360]  Out: -     Physical Exam:  General Appearance: Well-developed white male resting comfortably in bed does not appear in acute distress  Eyes: Conjunctiva are clear and anicteric  ENT: Mucous membranes are moist no erythema or exudates Mallampati 1 airway  Neck: Adenopathy or thyromegaly no jugular venous distention no hepatojugular reflux trachea midline  Lungs: Sounds lot better today I didn't hear any rhonchi or wheezing he did have some coughing with deep inspiration and his respirations are nonlabored and speaking in full sentences  Cardiac: Regular rate and rhythm no murmur  Abdomen: Soft nontender no palpable organomegaly or masses  : Not examined  Musc/Skel: Grossly normal  Skin: No jaundice no rashes no petechiae noted  Neuro: Alert oriented pleasant cooperative following commands grossly intact  Extremities/P Vascular: No clubbing cyanosis or edema palpable radial and dorsalis pedis pulses  MSE:  Pretty good spirits today        Labs:  WBC No results found for: WBCS   HGB Hemoglobin   Date Value Ref Range Status   06/21/2017 14.9 13.7 - 17.6 g/dL Final      HCT Hematocrit   Date Value Ref Range Status   06/21/2017 44.3 40.4 - 52.2 % Final      Platlets No results found for: LABPLAT      PT/INR:    No results found for: PROTIME/  No results found for: INR    Sodium No results found for: NA   Potassium No results found for: K   Chloride No results found for: CL   Bicarbonate No results found for: PLASMABICARB   BUN No results found for: BUN   Creatinine No results found for: CREATININE   Calcium No results found for: CALCIUM   Magnesium No results found for: MG         pH No results found for: PHART   pO2 No results found for: PO2ART   pCO2 No results found for: WQV7ANO   HCO3 No results found for: RMK3AND       enoxaparin 40 mg Subcutaneous Daily   ipratropium-albuterol 3 mL Nebulization 4x Daily - RT   levoFLOXacin 750 mg Oral Q24H   methylphenidate 10 mg Oral Daily   pantoprazole 40 mg Oral QAM   rOPINIRole 1 mg Oral Nightly   sodium chloride 4 mL Nebulization 4x Daily - RT       sodium chloride 125 mL/hr Last Rate: 125 mL/hr (06/20/17 0950)       Diagnostics:  Imaging Results (last 24 hours)     ** No results found for the last 24 hours. **                  Assessment/Plan     Patient Active Problem List   Diagnosis Code   • Thrush, oral B37.0   • ADD (attention deficit disorder) F98.8   • Hemorrhoids K64.9   • Bronchiectasis with acute lower respiratory infection J47.0   • Diverticul disease small and large intestine, no perforati or abscess K57.50   • Benign non-nodular prostatic hyperplasia without lower urinary tract symptoms N40.0   • Gastroesophageal reflux disease K21.9   • Malaise and fatigue R53.81, R53.83   • Environmental allergies Z91.09   • Hemoptysis R04.2   • Dyslipidemia E78.5   • Primary osteoarthritis involving multiple joints M15.0   • Pneumonia of left upper lobe due to infectious organism J18.9   • Abnormal EKG R94.31   • Chronic respiratory failure with hypoxia J96.11   • COPD (chronic obstructive pulmonary disease) J44.9   • Pneumonia of left lower lobe due to infectious organism J18.9   • Mild malnutrition E44.1   • Acute respiratory failure with hypoxia J96.01   •  Bronchitis J40   • COPD exacerbation J44.1       Impression #1 acute exacerbation bronchiectasis plus minus pneumonia.  Patient is improving with Levaquin he has had several recent exacerbations and I agree he may need to be considered for chronic therapy with azithromycin or we may need to consider giving him a vest type device to improve clearance.  or both.  We'll let Dr. Salter decide this.  With his bronchiectasis and recurrent problems I'm been ahead and give him a full 10 days of antibiotics but he can complete the remaining 5 days at home were really not doing a whole lot here for him  #2 COPD I agree I don't think there is much in way of acute exacerbation no wheezing I will would not push steroids at this point  #3 restless leg syndrome  #4 ADD      Plan for disposition: Home today    Pablo Sanabria MD  06/21/17  11:12 AM    Time:

## 2017-06-21 NOTE — DISCHARGE SUMMARY
Discharge summary      Discharge diagnoses:  #1 acute exacerbation bronchiectasis with pneumonia  #2 COPD  #3 restless leg syndrome  #4 attention deficit disorder  #5 gastroesophageal reflux disease    Hospital course: Patient admitted with another exacerbation of bronchiectasis possible little pneumonia cultures appear negative he has improved we will send him home with an additional 5 days of Levaquin to complete a 10 day course given his bronchiectasis and these recurrent infections.  He is on bronchodilators and oral he is also on albuterol and hypertonic saline nebs treatments at home along with guaifenesin.  I did not add inhaled corticosteroids because of the fear of increased pneumonia with these.  We may want to consider given his frequent recurrences possible chronic therapy with azithromycin and/or possibly getting vest therapy.  His artery using a other type device with his respiratory treatments the aerobica.  I've asked him to follow-up within the next 2 weeks in the office so we can evaluate after he has completed his current antibiotic course

## 2017-06-22 ENCOUNTER — TELEPHONE (OUTPATIENT)
Dept: FAMILY MEDICINE CLINIC | Facility: CLINIC | Age: 77
End: 2017-06-22

## 2017-06-22 LAB
BACTERIA SPEC AEROBE CULT: NORMAL
BACTERIA SPEC AEROBE CULT: NORMAL

## 2017-06-22 NOTE — TELEPHONE ENCOUNTER
RYLIE WANTS TO KNOW IF PT CAN HAVE NURSES FOLLOW UP WITH HIS LUNG CARE SINCE HE HAS BEEN DISCHARGED. CAN YOU PLEASE CALL AND GIVE A VERBAL?

## 2017-06-22 NOTE — PROGRESS NOTES
Discharge Planning Assessment  [facility]     Patient Name: Tony Leonard  MRN: [id number]  Today's Date: 6/22/2017    Admit Date: 6/17/2017          Discharge Needs Assessment     None            Discharge Plan       06/22/17 1311    Case Management/Social Work Plan    Additional Comments Notified Kari with [facility] and left her message of his discharge to home. KARLA Mcconnell RN, CCP.    Final Note    Final Note Home on 6/21/17 no home health order at discharge. KARLA Mcconnell RN, CCP        Discharge Placement     Facility/Agency Request Status Selected? Address Phone Number Fax Number    [facility] Accepted     [address] [place] [address] [phone]        Expected Discharge Date and Time     Expected Discharge Date Expected Discharge Time    Jun 21, 2017               Demographic Summary     None            Functional Status     None            Psychosocial     None            Abuse/Neglect     None            Legal     None            Substance Abuse     None            Patient Forms     None          Annita Mcconnell, RN

## 2017-06-28 ENCOUNTER — APPOINTMENT (OUTPATIENT)
Dept: GENERAL RADIOLOGY | Facility: HOSPITAL | Age: 77
End: 2017-06-28

## 2017-06-28 ENCOUNTER — HOSPITAL ENCOUNTER (INPATIENT)
Facility: HOSPITAL | Age: 77
LOS: 5 days | Discharge: HOME HEALTH | End: 2017-07-04
Attending: EMERGENCY MEDICINE | Admitting: INTERNAL MEDICINE

## 2017-06-28 DIAGNOSIS — R13.13 PHARYNGEAL DYSPHAGIA: ICD-10-CM

## 2017-06-28 DIAGNOSIS — J44.1 COPD EXACERBATION (HCC): Primary | ICD-10-CM

## 2017-06-28 DIAGNOSIS — J47.0 BRONCHIECTASIS WITH ACUTE LOWER RESPIRATORY INFECTION (HCC): ICD-10-CM

## 2017-06-28 DIAGNOSIS — R78.81 BACTEREMIA: ICD-10-CM

## 2017-06-28 LAB
ALBUMIN SERPL-MCNC: 3.9 G/DL (ref 3.5–5.2)
ALBUMIN/GLOB SERPL: 1.5 G/DL
ALP SERPL-CCNC: 59 U/L (ref 39–117)
ALT SERPL W P-5'-P-CCNC: 15 U/L (ref 1–41)
ANION GAP SERPL CALCULATED.3IONS-SCNC: 13.1 MMOL/L
ARTERIAL PATENCY WRIST A: POSITIVE
AST SERPL-CCNC: 16 U/L (ref 1–40)
ATMOSPHERIC PRESS: 749.5 MMHG
BASE EXCESS BLDA CALC-SCNC: 2.7 MMOL/L (ref 0–2)
BASOPHILS # BLD AUTO: 0.06 10*3/MM3 (ref 0–0.2)
BASOPHILS NFR BLD AUTO: 0.6 % (ref 0–1.5)
BDY SITE: ABNORMAL
BILIRUB SERPL-MCNC: 0.3 MG/DL (ref 0.1–1.2)
BUN BLD-MCNC: 19 MG/DL (ref 8–23)
BUN/CREAT SERPL: 18.6 (ref 7–25)
CALCIUM SPEC-SCNC: 8.9 MG/DL (ref 8.6–10.5)
CHLORIDE SERPL-SCNC: 101 MMOL/L (ref 98–107)
CO2 SERPL-SCNC: 26.9 MMOL/L (ref 22–29)
CREAT BLD-MCNC: 1.02 MG/DL (ref 0.76–1.27)
D-LACTATE SERPL-SCNC: 1.1 MMOL/L (ref 0.5–2)
DEPRECATED RDW RBC AUTO: 47.8 FL (ref 37–54)
EOSINOPHIL # BLD AUTO: 0.94 10*3/MM3 (ref 0–0.7)
EOSINOPHIL NFR BLD AUTO: 9.4 % (ref 0.3–6.2)
ERYTHROCYTE [DISTWIDTH] IN BLOOD BY AUTOMATED COUNT: 13.8 % (ref 11.5–14.5)
GAS FLOW AIRWAY: 2 LPM
GFR SERPL CREATININE-BSD FRML MDRD: 70 ML/MIN/1.73
GLOBULIN UR ELPH-MCNC: 2.6 GM/DL
GLUCOSE BLD-MCNC: 107 MG/DL (ref 65–99)
HCO3 BLDA-SCNC: 28.1 MMOL/L (ref 22–28)
HCT VFR BLD AUTO: 41.4 % (ref 40.4–52.2)
HGB BLD-MCNC: 13.6 G/DL (ref 13.7–17.6)
IMM GRANULOCYTES # BLD: 0.03 10*3/MM3 (ref 0–0.03)
IMM GRANULOCYTES NFR BLD: 0.3 % (ref 0–0.5)
LYMPHOCYTES # BLD AUTO: 1.27 10*3/MM3 (ref 0.9–4.8)
LYMPHOCYTES NFR BLD AUTO: 12.6 % (ref 19.6–45.3)
MCH RBC QN AUTO: 31.2 PG (ref 27–32.7)
MCHC RBC AUTO-ENTMCNC: 32.9 G/DL (ref 32.6–36.4)
MCV RBC AUTO: 95 FL (ref 79.8–96.2)
MODALITY: ABNORMAL
MONOCYTES # BLD AUTO: 0.94 10*3/MM3 (ref 0.2–1.2)
MONOCYTES NFR BLD AUTO: 9.4 % (ref 5–12)
NEUTROPHILS # BLD AUTO: 6.81 10*3/MM3 (ref 1.9–8.1)
NEUTROPHILS NFR BLD AUTO: 67.7 % (ref 42.7–76)
NT-PROBNP SERPL-MCNC: 123.9 PG/ML (ref 0–1800)
PCO2 BLDA: 45.3 MM HG (ref 35–45)
PH BLDA: 7.4 PH UNITS (ref 7.35–7.45)
PLATELET # BLD AUTO: 254 10*3/MM3 (ref 140–500)
PMV BLD AUTO: 9.5 FL (ref 6–12)
PO2 BLDA: 83.9 MM HG (ref 80–100)
POTASSIUM BLD-SCNC: 3.7 MMOL/L (ref 3.5–5.2)
PROCALCITONIN SERPL-MCNC: 0.07 NG/ML (ref 0.1–0.25)
PROT SERPL-MCNC: 6.5 G/DL (ref 6–8.5)
RBC # BLD AUTO: 4.36 10*6/MM3 (ref 4.6–6)
SAO2 % BLDCOA: 96.2 % (ref 92–99)
SODIUM BLD-SCNC: 141 MMOL/L (ref 136–145)
TOTAL RATE: 18 BREATHS/MINUTE
TROPONIN T SERPL-MCNC: <0.01 NG/ML (ref 0–0.03)
WBC NRBC COR # BLD: 10.05 10*3/MM3 (ref 4.5–10.7)

## 2017-06-28 PROCEDURE — 87147 CULTURE TYPE IMMUNOLOGIC: CPT | Performed by: PHYSICIAN ASSISTANT

## 2017-06-28 PROCEDURE — 87040 BLOOD CULTURE FOR BACTERIA: CPT | Performed by: PHYSICIAN ASSISTANT

## 2017-06-28 PROCEDURE — 94799 UNLISTED PULMONARY SVC/PX: CPT

## 2017-06-28 PROCEDURE — 71020 HC CHEST PA AND LATERAL: CPT

## 2017-06-28 PROCEDURE — 84145 PROCALCITONIN (PCT): CPT | Performed by: PHYSICIAN ASSISTANT

## 2017-06-28 PROCEDURE — 87150 DNA/RNA AMPLIFIED PROBE: CPT | Performed by: PHYSICIAN ASSISTANT

## 2017-06-28 PROCEDURE — 82803 BLOOD GASES ANY COMBINATION: CPT

## 2017-06-28 PROCEDURE — 83605 ASSAY OF LACTIC ACID: CPT | Performed by: PHYSICIAN ASSISTANT

## 2017-06-28 PROCEDURE — 93005 ELECTROCARDIOGRAM TRACING: CPT | Performed by: PHYSICIAN ASSISTANT

## 2017-06-28 PROCEDURE — 84484 ASSAY OF TROPONIN QUANT: CPT | Performed by: PHYSICIAN ASSISTANT

## 2017-06-28 PROCEDURE — 36600 WITHDRAWAL OF ARTERIAL BLOOD: CPT

## 2017-06-28 PROCEDURE — 80053 COMPREHEN METABOLIC PANEL: CPT | Performed by: PHYSICIAN ASSISTANT

## 2017-06-28 PROCEDURE — 85025 COMPLETE CBC W/AUTO DIFF WBC: CPT | Performed by: PHYSICIAN ASSISTANT

## 2017-06-28 PROCEDURE — 94640 AIRWAY INHALATION TREATMENT: CPT

## 2017-06-28 PROCEDURE — 83880 ASSAY OF NATRIURETIC PEPTIDE: CPT | Performed by: PHYSICIAN ASSISTANT

## 2017-06-28 PROCEDURE — 87186 SC STD MICRODIL/AGAR DIL: CPT | Performed by: PHYSICIAN ASSISTANT

## 2017-06-28 PROCEDURE — 93010 ELECTROCARDIOGRAM REPORT: CPT | Performed by: INTERNAL MEDICINE

## 2017-06-28 PROCEDURE — 99284 EMERGENCY DEPT VISIT MOD MDM: CPT

## 2017-06-28 RX ORDER — ALBUTEROL SULFATE 2.5 MG/3ML
2.5 SOLUTION RESPIRATORY (INHALATION)
Status: COMPLETED | OUTPATIENT
Start: 2017-06-28 | End: 2017-06-28

## 2017-06-28 RX ORDER — IPRATROPIUM BROMIDE AND ALBUTEROL SULFATE 2.5; .5 MG/3ML; MG/3ML
3 SOLUTION RESPIRATORY (INHALATION) ONCE
Status: COMPLETED | OUTPATIENT
Start: 2017-06-28 | End: 2017-06-28

## 2017-06-28 RX ORDER — SODIUM CHLORIDE 0.9 % (FLUSH) 0.9 %
10 SYRINGE (ML) INJECTION AS NEEDED
Status: DISCONTINUED | OUTPATIENT
Start: 2017-06-28 | End: 2017-07-04 | Stop reason: HOSPADM

## 2017-06-28 RX ADMIN — ALBUTEROL SULFATE 2.5 MG: 2.5 SOLUTION RESPIRATORY (INHALATION) at 22:18

## 2017-06-28 RX ADMIN — IPRATROPIUM BROMIDE AND ALBUTEROL SULFATE 3 ML: .5; 3 SOLUTION RESPIRATORY (INHALATION) at 21:23

## 2017-06-28 RX ADMIN — ALBUTEROL SULFATE 2.5 MG: 2.5 SOLUTION RESPIRATORY (INHALATION) at 21:24

## 2017-06-29 ENCOUNTER — ANESTHESIA (OUTPATIENT)
Dept: GASTROENTEROLOGY | Facility: HOSPITAL | Age: 77
End: 2017-06-29

## 2017-06-29 ENCOUNTER — ANESTHESIA EVENT (OUTPATIENT)
Dept: GASTROENTEROLOGY | Facility: HOSPITAL | Age: 77
End: 2017-06-29

## 2017-06-29 LAB
B PERT DNA SPEC QL NAA+PROBE: NOT DETECTED
BACTERIA BLD CULT: ABNORMAL
C PNEUM DNA NPH QL NAA+NON-PROBE: NOT DETECTED
FLUAV H1 2009 PAND RNA NPH QL NAA+PROBE: NOT DETECTED
FLUAV H1 HA GENE NPH QL NAA+PROBE: NOT DETECTED
FLUAV H3 RNA NPH QL NAA+PROBE: NOT DETECTED
FLUAV SUBTYP SPEC NAA+PROBE: NOT DETECTED
FLUBV RNA ISLT QL NAA+PROBE: NOT DETECTED
HADV DNA SPEC NAA+PROBE: NOT DETECTED
HCOV 229E RNA SPEC QL NAA+PROBE: NOT DETECTED
HCOV HKU1 RNA SPEC QL NAA+PROBE: NOT DETECTED
HCOV NL63 RNA SPEC QL NAA+PROBE: NOT DETECTED
HCOV OC43 RNA SPEC QL NAA+PROBE: NOT DETECTED
HMPV RNA NPH QL NAA+NON-PROBE: NOT DETECTED
HPIV1 RNA SPEC QL NAA+PROBE: NOT DETECTED
HPIV2 RNA SPEC QL NAA+PROBE: NOT DETECTED
HPIV3 RNA NPH QL NAA+PROBE: NOT DETECTED
HPIV4 P GENE NPH QL NAA+PROBE: NOT DETECTED
M PNEUMO IGG SER IA-ACNC: NOT DETECTED
RHINOVIRUS RNA SPEC NAA+PROBE: NOT DETECTED
RSV RNA NPH QL NAA+NON-PROBE: NOT DETECTED

## 2017-06-29 PROCEDURE — 87486 CHLMYD PNEUM DNA AMP PROBE: CPT | Performed by: INTERNAL MEDICINE

## 2017-06-29 PROCEDURE — 87102 FUNGUS ISOLATION CULTURE: CPT | Performed by: INTERNAL MEDICINE

## 2017-06-29 PROCEDURE — 88112 CYTOPATH CELL ENHANCE TECH: CPT | Performed by: INTERNAL MEDICINE

## 2017-06-29 PROCEDURE — 0B968ZX DRAINAGE OF RIGHT LOWER LOBE BRONCHUS, VIA NATURAL OR ARTIFICIAL OPENING ENDOSCOPIC, DIAGNOSTIC: ICD-10-PCS | Performed by: INTERNAL MEDICINE

## 2017-06-29 PROCEDURE — 87798 DETECT AGENT NOS DNA AMP: CPT | Performed by: INTERNAL MEDICINE

## 2017-06-29 PROCEDURE — 87581 M.PNEUMON DNA AMP PROBE: CPT | Performed by: INTERNAL MEDICINE

## 2017-06-29 PROCEDURE — 87633 RESP VIRUS 12-25 TARGETS: CPT | Performed by: INTERNAL MEDICINE

## 2017-06-29 PROCEDURE — 92610 EVALUATE SWALLOWING FUNCTION: CPT | Performed by: SPEECH-LANGUAGE PATHOLOGIST

## 2017-06-29 PROCEDURE — 94640 AIRWAY INHALATION TREATMENT: CPT

## 2017-06-29 PROCEDURE — 87205 SMEAR GRAM STAIN: CPT | Performed by: INTERNAL MEDICINE

## 2017-06-29 PROCEDURE — 25010000002 METHYLPREDNISOLONE PER 125 MG: Performed by: PHYSICIAN ASSISTANT

## 2017-06-29 PROCEDURE — 87070 CULTURE OTHR SPECIMN AEROBIC: CPT | Performed by: INTERNAL MEDICINE

## 2017-06-29 PROCEDURE — 0B9J8ZX DRAINAGE OF LEFT LOWER LUNG LOBE, VIA NATURAL OR ARTIFICIAL OPENING ENDOSCOPIC, DIAGNOSTIC: ICD-10-PCS | Performed by: INTERNAL MEDICINE

## 2017-06-29 PROCEDURE — 88305 TISSUE EXAM BY PATHOLOGIST: CPT | Performed by: INTERNAL MEDICINE

## 2017-06-29 PROCEDURE — 25010000002 PROPOFOL 10 MG/ML EMULSION: Performed by: ANESTHESIOLOGY

## 2017-06-29 PROCEDURE — 94799 UNLISTED PULMONARY SVC/PX: CPT

## 2017-06-29 RX ORDER — SODIUM CHLORIDE, SODIUM LACTATE, POTASSIUM CHLORIDE, CALCIUM CHLORIDE 600; 310; 30; 20 MG/100ML; MG/100ML; MG/100ML; MG/100ML
INJECTION, SOLUTION INTRAVENOUS CONTINUOUS PRN
Status: DISCONTINUED | OUTPATIENT
Start: 2017-06-29 | End: 2017-06-29 | Stop reason: SURG

## 2017-06-29 RX ORDER — ROPINIROLE 1 MG/1
1 TABLET, FILM COATED ORAL NIGHTLY
Status: DISCONTINUED | OUTPATIENT
Start: 2017-06-29 | End: 2017-07-04 | Stop reason: HOSPADM

## 2017-06-29 RX ORDER — PROPOFOL 10 MG/ML
VIAL (ML) INTRAVENOUS CONTINUOUS PRN
Status: DISCONTINUED | OUTPATIENT
Start: 2017-06-29 | End: 2017-06-29 | Stop reason: SURG

## 2017-06-29 RX ORDER — IPRATROPIUM BROMIDE AND ALBUTEROL SULFATE 2.5; .5 MG/3ML; MG/3ML
3 SOLUTION RESPIRATORY (INHALATION)
Status: DISCONTINUED | OUTPATIENT
Start: 2017-06-29 | End: 2017-07-04 | Stop reason: HOSPADM

## 2017-06-29 RX ORDER — METHYLPREDNISOLONE SODIUM SUCCINATE 125 MG/2ML
80 INJECTION, POWDER, LYOPHILIZED, FOR SOLUTION INTRAMUSCULAR; INTRAVENOUS ONCE
Status: COMPLETED | OUTPATIENT
Start: 2017-06-29 | End: 2017-06-29

## 2017-06-29 RX ORDER — LIDOCAINE HYDROCHLORIDE 20 MG/ML
INJECTION, SOLUTION EPIDURAL; INFILTRATION; INTRACAUDAL; PERINEURAL AS NEEDED
Status: DISCONTINUED | OUTPATIENT
Start: 2017-06-29 | End: 2017-06-29 | Stop reason: HOSPADM

## 2017-06-29 RX ORDER — LIDOCAINE HYDROCHLORIDE 10 MG/ML
INJECTION, SOLUTION EPIDURAL; INFILTRATION; INTRACAUDAL; PERINEURAL AS NEEDED
Status: DISCONTINUED | OUTPATIENT
Start: 2017-06-29 | End: 2017-06-29 | Stop reason: HOSPADM

## 2017-06-29 RX ORDER — LIDOCAINE HYDROCHLORIDE 20 MG/ML
INJECTION, SOLUTION INFILTRATION; PERINEURAL AS NEEDED
Status: DISCONTINUED | OUTPATIENT
Start: 2017-06-29 | End: 2017-06-29 | Stop reason: SURG

## 2017-06-29 RX ORDER — PROPOFOL 10 MG/ML
VIAL (ML) INTRAVENOUS AS NEEDED
Status: DISCONTINUED | OUTPATIENT
Start: 2017-06-29 | End: 2017-06-29 | Stop reason: SURG

## 2017-06-29 RX ADMIN — IPRATROPIUM BROMIDE AND ALBUTEROL SULFATE 3 ML: .5; 3 SOLUTION RESPIRATORY (INHALATION) at 07:24

## 2017-06-29 RX ADMIN — PROPOFOL 100 MCG/KG/MIN: 10 INJECTION, EMULSION INTRAVENOUS at 13:20

## 2017-06-29 RX ADMIN — IPRATROPIUM BROMIDE AND ALBUTEROL SULFATE 3 ML: .5; 3 SOLUTION RESPIRATORY (INHALATION) at 11:10

## 2017-06-29 RX ADMIN — METHYLPREDNISOLONE SODIUM SUCCINATE 80 MG: 125 INJECTION, POWDER, FOR SOLUTION INTRAMUSCULAR; INTRAVENOUS at 02:11

## 2017-06-29 RX ADMIN — PROPOFOL 100 MG: 10 INJECTION, EMULSION INTRAVENOUS at 13:20

## 2017-06-29 RX ADMIN — IPRATROPIUM BROMIDE AND ALBUTEROL SULFATE 3 ML: .5; 3 SOLUTION RESPIRATORY (INHALATION) at 15:36

## 2017-06-29 RX ADMIN — ROPINIROLE 1 MG: 1 TABLET, FILM COATED ORAL at 21:47

## 2017-06-29 RX ADMIN — PROPOFOL 100 MG: 10 INJECTION, EMULSION INTRAVENOUS at 13:25

## 2017-06-29 RX ADMIN — PROPOFOL 100 MG: 10 INJECTION, EMULSION INTRAVENOUS at 13:30

## 2017-06-29 RX ADMIN — SODIUM CHLORIDE, POTASSIUM CHLORIDE, SODIUM LACTATE AND CALCIUM CHLORIDE: 600; 310; 30; 20 INJECTION, SOLUTION INTRAVENOUS at 13:20

## 2017-06-29 RX ADMIN — LIDOCAINE HYDROCHLORIDE 60 MG: 20 INJECTION, SOLUTION INFILTRATION; PERINEURAL at 13:25

## 2017-06-29 RX ADMIN — IPRATROPIUM BROMIDE AND ALBUTEROL SULFATE 3 ML: .5; 3 SOLUTION RESPIRATORY (INHALATION) at 19:47

## 2017-06-29 NOTE — ANESTHESIA PREPROCEDURE EVALUATION
Anesthesia Evaluation     Patient summary reviewed and Nursing notes reviewed          Airway   Mallampati: I  TM distance: <3 FB  Neck ROM: full  no difficulty expected  Dental    (+) poor dentition    Pulmonary - normal exam   (+) pneumonia , COPD,   Cardiovascular - negative cardio ROS and normal exam        Neuro/Psych- negative ROS  GI/Hepatic/Renal/Endo    (+)  GERD,     Musculoskeletal     Abdominal  - normal exam    Bowel sounds: normal.   Substance History - negative use     OB/GYN negative ob/gyn ROS         Other   (+) arthritis                                     Anesthesia Plan    ASA 3     MAC     Anesthetic plan and risks discussed with patient.

## 2017-06-29 NOTE — ANESTHESIA POSTPROCEDURE EVALUATION
Patient: Tony Leonard    Procedure Summary     Date Anesthesia Start Anesthesia Stop Room / Location    06/29/17 1323 1346  [facility]       Procedure Diagnosis Surgeon Provider    BRONCHOSCOPY WITH WASHINGS (Bilateral Bronchus) Bronchiectasis with acute lower respiratory infection  (Bronchiectasis with acute lower respiratory infection [J47.0]) MD Jered Smith MD          Anesthesia Type: MAC  Last vitals  BP 93/51 (06/29/17 1405)    Temp 36.8 °C (98.3 °F) (06/29/17 1315)    Pulse 89 (06/29/17 1405)   Resp 16 (06/29/17 1405)    SpO2 95 % (06/29/17 1405)      Post Anesthesia Care and Evaluation    Patient location during evaluation: PACU  Patient participation: complete - patient participated  Level of consciousness: sleepy but conscious  Pain score: 0  Pain management: adequate  Airway patency: patent  Anesthetic complications: No anesthetic complications  PONV Status: none  Cardiovascular status: acceptable  Respiratory status: acceptable  Hydration status: acceptable

## 2017-06-30 ENCOUNTER — APPOINTMENT (OUTPATIENT)
Dept: GENERAL RADIOLOGY | Facility: HOSPITAL | Age: 77
End: 2017-06-30

## 2017-06-30 PROCEDURE — 94760 N-INVAS EAR/PLS OXIMETRY 1: CPT

## 2017-06-30 PROCEDURE — 74230 X-RAY XM SWLNG FUNCJ C+: CPT

## 2017-06-30 PROCEDURE — 25010000003 CEFAZOLIN IN DEXTROSE 2-4 GM/100ML-% SOLUTION: Performed by: INTERNAL MEDICINE

## 2017-06-30 PROCEDURE — 94799 UNLISTED PULMONARY SVC/PX: CPT

## 2017-06-30 PROCEDURE — 92611 MOTION FLUOROSCOPY/SWALLOW: CPT

## 2017-06-30 PROCEDURE — 87040 BLOOD CULTURE FOR BACTERIA: CPT | Performed by: INTERNAL MEDICINE

## 2017-06-30 PROCEDURE — 99223 1ST HOSP IP/OBS HIGH 75: CPT | Performed by: INTERNAL MEDICINE

## 2017-06-30 PROCEDURE — 25010000002 ENOXAPARIN PER 10 MG: Performed by: NURSE PRACTITIONER

## 2017-06-30 RX ORDER — CEFAZOLIN SODIUM 2 G/100ML
2 INJECTION, SOLUTION INTRAVENOUS EVERY 8 HOURS
Status: DISCONTINUED | OUTPATIENT
Start: 2017-06-30 | End: 2017-07-04 | Stop reason: HOSPADM

## 2017-06-30 RX ORDER — MULTIPLE VITAMINS W/ MINERALS TAB 9MG-400MCG
1 TAB ORAL DAILY
Status: DISCONTINUED | OUTPATIENT
Start: 2017-06-30 | End: 2017-07-04 | Stop reason: HOSPADM

## 2017-06-30 RX ORDER — SODIUM CHLORIDE FOR INHALATION 7 %
4 VIAL, NEBULIZER (ML) INHALATION
Status: DISCONTINUED | OUTPATIENT
Start: 2017-06-30 | End: 2017-07-04 | Stop reason: HOSPADM

## 2017-06-30 RX ORDER — BENZONATATE 200 MG/1
200 CAPSULE ORAL 2 TIMES DAILY PRN
Status: DISCONTINUED | OUTPATIENT
Start: 2017-06-30 | End: 2017-07-04 | Stop reason: HOSPADM

## 2017-06-30 RX ORDER — GUAIFENESIN/DEXTROMETHORPHAN 100-10MG/5
5 SYRUP ORAL EVERY 12 HOURS
Status: DISCONTINUED | OUTPATIENT
Start: 2017-06-30 | End: 2017-07-04 | Stop reason: HOSPADM

## 2017-06-30 RX ORDER — TRAMADOL HYDROCHLORIDE 50 MG/1
50 TABLET ORAL EVERY 6 HOURS PRN
Status: DISCONTINUED | OUTPATIENT
Start: 2017-06-30 | End: 2017-07-04 | Stop reason: HOSPADM

## 2017-06-30 RX ORDER — ROPINIROLE 1 MG/1
1 TABLET, FILM COATED ORAL NIGHTLY
Status: DISCONTINUED | OUTPATIENT
Start: 2017-06-30 | End: 2017-06-30

## 2017-06-30 RX ORDER — PANTOPRAZOLE SODIUM 40 MG/1
40 TABLET, DELAYED RELEASE ORAL EVERY MORNING
Status: DISCONTINUED | OUTPATIENT
Start: 2017-06-30 | End: 2017-07-04 | Stop reason: HOSPADM

## 2017-06-30 RX ADMIN — IPRATROPIUM BROMIDE AND ALBUTEROL SULFATE 3 ML: .5; 3 SOLUTION RESPIRATORY (INHALATION) at 15:05

## 2017-06-30 RX ADMIN — IPRATROPIUM BROMIDE AND ALBUTEROL SULFATE 3 ML: .5; 3 SOLUTION RESPIRATORY (INHALATION) at 04:26

## 2017-06-30 RX ADMIN — MULTIPLE VITAMINS W/ MINERALS TAB 1 TABLET: TAB at 12:06

## 2017-06-30 RX ADMIN — ENOXAPARIN SODIUM 40 MG: 40 INJECTION SUBCUTANEOUS at 16:05

## 2017-06-30 RX ADMIN — Medication 4 ML: at 21:44

## 2017-06-30 RX ADMIN — PANTOPRAZOLE SODIUM 40 MG: 40 TABLET, DELAYED RELEASE ORAL at 12:06

## 2017-06-30 RX ADMIN — TRAMADOL HYDROCHLORIDE 50 MG: 50 TABLET, FILM COATED ORAL at 16:05

## 2017-06-30 RX ADMIN — ROPINIROLE 1 MG: 1 TABLET, FILM COATED ORAL at 20:30

## 2017-06-30 RX ADMIN — GUAIFENESIN AND DEXTROMETHORPHAN 5 ML: 100; 10 SYRUP ORAL at 12:15

## 2017-06-30 RX ADMIN — CEFAZOLIN SODIUM 2 G: 2 INJECTION, SOLUTION INTRAVENOUS at 08:43

## 2017-06-30 RX ADMIN — IPRATROPIUM BROMIDE AND ALBUTEROL SULFATE 3 ML: .5; 3 SOLUTION RESPIRATORY (INHALATION) at 06:57

## 2017-06-30 RX ADMIN — IPRATROPIUM BROMIDE AND ALBUTEROL SULFATE 3 ML: .5; 3 SOLUTION RESPIRATORY (INHALATION) at 21:39

## 2017-06-30 RX ADMIN — CEFAZOLIN SODIUM 2 G: 2 INJECTION, SOLUTION INTRAVENOUS at 16:06

## 2017-06-30 RX ADMIN — IPRATROPIUM BROMIDE AND ALBUTEROL SULFATE 3 ML: .5; 3 SOLUTION RESPIRATORY (INHALATION) at 10:48

## 2017-07-01 ENCOUNTER — APPOINTMENT (OUTPATIENT)
Dept: CARDIOLOGY | Facility: HOSPITAL | Age: 77
End: 2017-07-01
Attending: INTERNAL MEDICINE

## 2017-07-01 LAB
BACTERIA SPEC AEROBE CULT: ABNORMAL
BACTERIA SPEC RESP CULT: NORMAL
BH CV ECHO MEAS - AO MAX PG (FULL): 0.46 MMHG
BH CV ECHO MEAS - AO MAX PG: 4.3 MMHG
BH CV ECHO MEAS - AO MEAN PG (FULL): 0 MMHG
BH CV ECHO MEAS - AO MEAN PG: 2 MMHG
BH CV ECHO MEAS - AO ROOT AREA (BSA CORRECTED): 1.9
BH CV ECHO MEAS - AO ROOT AREA: 10.8 CM^2
BH CV ECHO MEAS - AO ROOT DIAM: 3.7 CM
BH CV ECHO MEAS - AO V2 MAX: 104 CM/SEC
BH CV ECHO MEAS - AO V2 MEAN: 73.1 CM/SEC
BH CV ECHO MEAS - AO V2 VTI: 25.7 CM
BH CV ECHO MEAS - AVA(I,A): 3.7 CM^2
BH CV ECHO MEAS - AVA(I,D): 3.7 CM^2
BH CV ECHO MEAS - AVA(V,A): 3.9 CM^2
BH CV ECHO MEAS - AVA(V,D): 3.9 CM^2
BH CV ECHO MEAS - BSA(HAYCOCK): 2 M^2
BH CV ECHO MEAS - BSA: 2 M^2
BH CV ECHO MEAS - BZI_BMI: 23.4 KILOGRAMS/M^2
BH CV ECHO MEAS - BZI_METRIC_HEIGHT: 180.3 CM
BH CV ECHO MEAS - BZI_METRIC_WEIGHT: 76.2 KG
BH CV ECHO MEAS - CONTRAST EF (2CH): 54.5 ML/M^2
BH CV ECHO MEAS - CONTRAST EF 4CH: 56.4 ML/M^2
BH CV ECHO MEAS - EDV(CUBED): 148.9 ML
BH CV ECHO MEAS - EDV(MOD-SP2): 145 ML
BH CV ECHO MEAS - EDV(MOD-SP4): 149 ML
BH CV ECHO MEAS - EDV(TEICH): 135.3 ML
BH CV ECHO MEAS - EF(CUBED): 57 %
BH CV ECHO MEAS - EF(MOD-SP2): 54.5 %
BH CV ECHO MEAS - EF(MOD-SP4): 56.4 %
BH CV ECHO MEAS - EF(TEICH): 48.3 %
BH CV ECHO MEAS - ESV(CUBED): 64 ML
BH CV ECHO MEAS - ESV(MOD-SP2): 66 ML
BH CV ECHO MEAS - ESV(MOD-SP4): 65 ML
BH CV ECHO MEAS - ESV(TEICH): 70 ML
BH CV ECHO MEAS - FS: 24.5 %
BH CV ECHO MEAS - IVS/LVPW: 1
BH CV ECHO MEAS - IVSD: 0.6 CM
BH CV ECHO MEAS - LAT PEAK E' VEL: 12 CM/SEC
BH CV ECHO MEAS - LV DIASTOLIC VOL/BSA (35-75): 76.1 ML/M^2
BH CV ECHO MEAS - LV MASS(C)D: 105.2 GRAMS
BH CV ECHO MEAS - LV MASS(C)DI: 53.7 GRAMS/M^2
BH CV ECHO MEAS - LV MAX PG: 3.9 MMHG
BH CV ECHO MEAS - LV MEAN PG: 2 MMHG
BH CV ECHO MEAS - LV SYSTOLIC VOL/BSA (12-30): 33.2 ML/M^2
BH CV ECHO MEAS - LV V1 MAX: 98.3 CM/SEC
BH CV ECHO MEAS - LV V1 MEAN: 62.5 CM/SEC
BH CV ECHO MEAS - LV V1 VTI: 22.9 CM
BH CV ECHO MEAS - LVIDD: 5.3 CM
BH CV ECHO MEAS - LVIDS: 4 CM
BH CV ECHO MEAS - LVLD AP2: 10.3 CM
BH CV ECHO MEAS - LVLD AP4: 9.5 CM
BH CV ECHO MEAS - LVLS AP2: 8.9 CM
BH CV ECHO MEAS - LVLS AP4: 8.2 CM
BH CV ECHO MEAS - LVOT AREA (M): 4.2 CM^2
BH CV ECHO MEAS - LVOT AREA: 4.2 CM^2
BH CV ECHO MEAS - LVOT DIAM: 2.3 CM
BH CV ECHO MEAS - LVPWD: 0.6 CM
BH CV ECHO MEAS - MED PEAK E' VEL: 9 CM/SEC
BH CV ECHO MEAS - MV A DUR: 0.14 SEC
BH CV ECHO MEAS - MV A MAX VEL: 97.5 CM/SEC
BH CV ECHO MEAS - MV DEC SLOPE: 578 CM/SEC^2
BH CV ECHO MEAS - MV DEC TIME: 0.2 SEC
BH CV ECHO MEAS - MV E MAX VEL: 81 CM/SEC
BH CV ECHO MEAS - MV E/A: 0.83
BH CV ECHO MEAS - MV MAX PG: 4.1 MMHG
BH CV ECHO MEAS - MV MEAN PG: 2 MMHG
BH CV ECHO MEAS - MV P1/2T MAX VEL: 84.2 CM/SEC
BH CV ECHO MEAS - MV P1/2T: 42.7 MSEC
BH CV ECHO MEAS - MV V2 MAX: 101 CM/SEC
BH CV ECHO MEAS - MV V2 MEAN: 56.3 CM/SEC
BH CV ECHO MEAS - MV V2 VTI: 24.1 CM
BH CV ECHO MEAS - MVA P1/2T LCG: 2.6 CM^2
BH CV ECHO MEAS - MVA(P1/2T): 5.2 CM^2
BH CV ECHO MEAS - MVA(VTI): 3.9 CM^2
BH CV ECHO MEAS - PA ACC TIME: 0.1 SEC
BH CV ECHO MEAS - PA MAX PG (FULL): 1.3 MMHG
BH CV ECHO MEAS - PA MAX PG: 2.3 MMHG
BH CV ECHO MEAS - PA PR(ACCEL): 36.3 MMHG
BH CV ECHO MEAS - PA V2 MAX: 76 CM/SEC
BH CV ECHO MEAS - PULM A REVS DUR: 0.14 SEC
BH CV ECHO MEAS - PULM A REVS VEL: 45.1 CM/SEC
BH CV ECHO MEAS - PULM DIAS VEL: 40.7 CM/SEC
BH CV ECHO MEAS - PULM S/D: 1.6
BH CV ECHO MEAS - PULM SYS VEL: 63.5 CM/SEC
BH CV ECHO MEAS - PVA(V,A): 2.6 CM^2
BH CV ECHO MEAS - PVA(V,D): 2.6 CM^2
BH CV ECHO MEAS - QP/QS: 0.4
BH CV ECHO MEAS - RV MAX PG: 1 MMHG
BH CV ECHO MEAS - RV MEAN PG: 0 MMHG
BH CV ECHO MEAS - RV V1 MAX: 51 CM/SEC
BH CV ECHO MEAS - RV V1 MEAN: 32.7 CM/SEC
BH CV ECHO MEAS - RV V1 VTI: 9.9 CM
BH CV ECHO MEAS - RVOT AREA: 3.8 CM^2
BH CV ECHO MEAS - RVOT DIAM: 2.2 CM
BH CV ECHO MEAS - SI(AO): 141.1 ML/M^2
BH CV ECHO MEAS - SI(CUBED): 43.3 ML/M^2
BH CV ECHO MEAS - SI(LVOT): 48.6 ML/M^2
BH CV ECHO MEAS - SI(MOD-SP2): 40.3 ML/M^2
BH CV ECHO MEAS - SI(MOD-SP4): 42.9 ML/M^2
BH CV ECHO MEAS - SI(TEICH): 33.4 ML/M^2
BH CV ECHO MEAS - SV(AO): 276.3 ML
BH CV ECHO MEAS - SV(CUBED): 84.9 ML
BH CV ECHO MEAS - SV(LVOT): 95.1 ML
BH CV ECHO MEAS - SV(MOD-SP2): 79 ML
BH CV ECHO MEAS - SV(MOD-SP4): 84 ML
BH CV ECHO MEAS - SV(RVOT): 37.6 ML
BH CV ECHO MEAS - SV(TEICH): 65.3 ML
BH CV ECHO MEAS - TAPSE (>1.6): 2.8 CM2
BH CV VAS BP RIGHT ARM: NORMAL MMHG
BH CV XLRA - RV BASE: 4.1 CM
BH CV XLRA - RV LENGTH: 9.1 CM
BH CV XLRA - RV MID: 3 CM
BH CV XLRA - TDI S': 14 CM/SEC
E/E' RATIO: 8
GRAM STN SPEC: ABNORMAL
GRAM STN SPEC: NORMAL
GRAM STN SPEC: NORMAL
LEFT ATRIUM VOLUME INDEX: 31 ML/M2
LEFT ATRIUM VOLUME: 54 CM3

## 2017-07-01 PROCEDURE — 99232 SBSQ HOSP IP/OBS MODERATE 35: CPT | Performed by: INTERNAL MEDICINE

## 2017-07-01 PROCEDURE — 94799 UNLISTED PULMONARY SVC/PX: CPT

## 2017-07-01 PROCEDURE — 25010000002 ENOXAPARIN PER 10 MG: Performed by: NURSE PRACTITIONER

## 2017-07-01 PROCEDURE — 93306 TTE W/DOPPLER COMPLETE: CPT | Performed by: INTERNAL MEDICINE

## 2017-07-01 PROCEDURE — 25010000003 CEFAZOLIN IN DEXTROSE 2-4 GM/100ML-% SOLUTION: Performed by: INTERNAL MEDICINE

## 2017-07-01 PROCEDURE — 93306 TTE W/DOPPLER COMPLETE: CPT

## 2017-07-01 RX ADMIN — TRAMADOL HYDROCHLORIDE 50 MG: 50 TABLET, FILM COATED ORAL at 09:19

## 2017-07-01 RX ADMIN — IPRATROPIUM BROMIDE AND ALBUTEROL SULFATE 3 ML: .5; 3 SOLUTION RESPIRATORY (INHALATION) at 22:28

## 2017-07-01 RX ADMIN — CEFAZOLIN SODIUM 2 G: 2 INJECTION, SOLUTION INTRAVENOUS at 16:13

## 2017-07-01 RX ADMIN — PANTOPRAZOLE SODIUM 40 MG: 40 TABLET, DELAYED RELEASE ORAL at 06:44

## 2017-07-01 RX ADMIN — ENOXAPARIN SODIUM 40 MG: 40 INJECTION SUBCUTANEOUS at 09:19

## 2017-07-01 RX ADMIN — Medication 4 ML: at 08:55

## 2017-07-01 RX ADMIN — ROPINIROLE 1 MG: 1 TABLET, FILM COATED ORAL at 20:25

## 2017-07-01 RX ADMIN — GUAIFENESIN AND DEXTROMETHORPHAN 5 ML: 100; 10 SYRUP ORAL at 23:50

## 2017-07-01 RX ADMIN — IPRATROPIUM BROMIDE AND ALBUTEROL SULFATE 3 ML: .5; 3 SOLUTION RESPIRATORY (INHALATION) at 08:54

## 2017-07-01 RX ADMIN — GUAIFENESIN AND DEXTROMETHORPHAN 5 ML: 100; 10 SYRUP ORAL at 11:55

## 2017-07-01 RX ADMIN — TRAMADOL HYDROCHLORIDE 50 MG: 50 TABLET, FILM COATED ORAL at 16:47

## 2017-07-01 RX ADMIN — MULTIPLE VITAMINS W/ MINERALS TAB 1 TABLET: TAB at 09:19

## 2017-07-01 RX ADMIN — GUAIFENESIN AND DEXTROMETHORPHAN 5 ML: 100; 10 SYRUP ORAL at 00:09

## 2017-07-01 RX ADMIN — IPRATROPIUM BROMIDE AND ALBUTEROL SULFATE 3 ML: .5; 3 SOLUTION RESPIRATORY (INHALATION) at 12:33

## 2017-07-01 RX ADMIN — IPRATROPIUM BROMIDE AND ALBUTEROL SULFATE 3 ML: .5; 3 SOLUTION RESPIRATORY (INHALATION) at 16:32

## 2017-07-01 RX ADMIN — CEFAZOLIN SODIUM 2 G: 2 INJECTION, SOLUTION INTRAVENOUS at 23:51

## 2017-07-01 RX ADMIN — Medication 4 ML: at 22:35

## 2017-07-01 RX ADMIN — CEFAZOLIN SODIUM 2 G: 2 INJECTION, SOLUTION INTRAVENOUS at 09:19

## 2017-07-01 RX ADMIN — CEFAZOLIN SODIUM 2 G: 2 INJECTION, SOLUTION INTRAVENOUS at 00:09

## 2017-07-02 LAB
CREAT BLD-MCNC: 1.05 MG/DL (ref 0.76–1.27)
DEPRECATED RDW RBC AUTO: 51.3 FL (ref 37–54)
ERYTHROCYTE [DISTWIDTH] IN BLOOD BY AUTOMATED COUNT: 14.4 % (ref 11.5–14.5)
GFR SERPL CREATININE-BSD FRML MDRD: 68 ML/MIN/1.73
HCT VFR BLD AUTO: 38.6 % (ref 40.4–52.2)
HGB BLD-MCNC: 12.7 G/DL (ref 13.7–17.6)
MCH RBC QN AUTO: 31.8 PG (ref 27–32.7)
MCHC RBC AUTO-ENTMCNC: 32.9 G/DL (ref 32.6–36.4)
MCV RBC AUTO: 96.7 FL (ref 79.8–96.2)
PLATELET # BLD AUTO: 248 10*3/MM3 (ref 140–500)
PMV BLD AUTO: 9.6 FL (ref 6–12)
RBC # BLD AUTO: 3.99 10*6/MM3 (ref 4.6–6)
WBC NRBC COR # BLD: 7.54 10*3/MM3 (ref 4.5–10.7)

## 2017-07-02 PROCEDURE — 85027 COMPLETE CBC AUTOMATED: CPT | Performed by: INTERNAL MEDICINE

## 2017-07-02 PROCEDURE — 25010000002 ENOXAPARIN PER 10 MG: Performed by: NURSE PRACTITIONER

## 2017-07-02 PROCEDURE — 94799 UNLISTED PULMONARY SVC/PX: CPT

## 2017-07-02 PROCEDURE — 82565 ASSAY OF CREATININE: CPT | Performed by: INTERNAL MEDICINE

## 2017-07-02 PROCEDURE — 25010000003 CEFAZOLIN IN DEXTROSE 2-4 GM/100ML-% SOLUTION: Performed by: INTERNAL MEDICINE

## 2017-07-02 PROCEDURE — 99232 SBSQ HOSP IP/OBS MODERATE 35: CPT | Performed by: INTERNAL MEDICINE

## 2017-07-02 RX ORDER — DOCUSATE SODIUM 100 MG/1
100 CAPSULE, LIQUID FILLED ORAL 2 TIMES DAILY
Status: DISCONTINUED | OUTPATIENT
Start: 2017-07-02 | End: 2017-07-04 | Stop reason: HOSPADM

## 2017-07-02 RX ADMIN — IPRATROPIUM BROMIDE AND ALBUTEROL SULFATE 3 ML: .5; 3 SOLUTION RESPIRATORY (INHALATION) at 07:15

## 2017-07-02 RX ADMIN — CEFAZOLIN SODIUM 2 G: 2 INJECTION, SOLUTION INTRAVENOUS at 09:12

## 2017-07-02 RX ADMIN — IPRATROPIUM BROMIDE AND ALBUTEROL SULFATE 3 ML: .5; 3 SOLUTION RESPIRATORY (INHALATION) at 10:24

## 2017-07-02 RX ADMIN — IPRATROPIUM BROMIDE AND ALBUTEROL SULFATE 3 ML: .5; 3 SOLUTION RESPIRATORY (INHALATION) at 14:57

## 2017-07-02 RX ADMIN — IPRATROPIUM BROMIDE AND ALBUTEROL SULFATE 3 ML: .5; 3 SOLUTION RESPIRATORY (INHALATION) at 19:40

## 2017-07-02 RX ADMIN — TRAMADOL HYDROCHLORIDE 50 MG: 50 TABLET, FILM COATED ORAL at 18:42

## 2017-07-02 RX ADMIN — TRAMADOL HYDROCHLORIDE 50 MG: 50 TABLET, FILM COATED ORAL at 12:40

## 2017-07-02 RX ADMIN — PANTOPRAZOLE SODIUM 40 MG: 40 TABLET, DELAYED RELEASE ORAL at 06:10

## 2017-07-02 RX ADMIN — TRAMADOL HYDROCHLORIDE 50 MG: 50 TABLET, FILM COATED ORAL at 06:48

## 2017-07-02 RX ADMIN — GUAIFENESIN AND DEXTROMETHORPHAN 5 ML: 100; 10 SYRUP ORAL at 23:53

## 2017-07-02 RX ADMIN — MULTIPLE VITAMINS W/ MINERALS TAB 1 TABLET: TAB at 09:12

## 2017-07-02 RX ADMIN — ROPINIROLE 1 MG: 1 TABLET, FILM COATED ORAL at 21:00

## 2017-07-02 RX ADMIN — ENOXAPARIN SODIUM 40 MG: 40 INJECTION SUBCUTANEOUS at 09:12

## 2017-07-02 RX ADMIN — GUAIFENESIN AND DEXTROMETHORPHAN 5 ML: 100; 10 SYRUP ORAL at 12:39

## 2017-07-02 RX ADMIN — Medication 4 ML: at 19:42

## 2017-07-02 RX ADMIN — CEFAZOLIN SODIUM 2 G: 2 INJECTION, SOLUTION INTRAVENOUS at 17:44

## 2017-07-03 LAB
CYTO UR: NORMAL
GLUCOSE BLDC GLUCOMTR-MCNC: 101 MG/DL (ref 70–130)
LAB AP CASE REPORT: NORMAL
Lab: NORMAL
PATH REPORT.FINAL DX SPEC: NORMAL
PATH REPORT.GROSS SPEC: NORMAL

## 2017-07-03 PROCEDURE — 94799 UNLISTED PULMONARY SVC/PX: CPT

## 2017-07-03 PROCEDURE — 25010000002 ENOXAPARIN PER 10 MG: Performed by: NURSE PRACTITIONER

## 2017-07-03 PROCEDURE — 82962 GLUCOSE BLOOD TEST: CPT

## 2017-07-03 PROCEDURE — 99232 SBSQ HOSP IP/OBS MODERATE 35: CPT | Performed by: INTERNAL MEDICINE

## 2017-07-03 PROCEDURE — 25010000003 CEFAZOLIN IN DEXTROSE 2-4 GM/100ML-% SOLUTION: Performed by: INTERNAL MEDICINE

## 2017-07-03 RX ORDER — MUSCLE RUB CREAM 100; 150 MG/G; MG/G
CREAM TOPICAL
Status: DISCONTINUED | OUTPATIENT
Start: 2017-07-03 | End: 2017-07-04 | Stop reason: HOSPADM

## 2017-07-03 RX ADMIN — CEFAZOLIN SODIUM 2 G: 2 INJECTION, SOLUTION INTRAVENOUS at 00:42

## 2017-07-03 RX ADMIN — MULTIPLE VITAMINS W/ MINERALS TAB 1 TABLET: TAB at 08:57

## 2017-07-03 RX ADMIN — IPRATROPIUM BROMIDE AND ALBUTEROL SULFATE 3 ML: .5; 3 SOLUTION RESPIRATORY (INHALATION) at 12:10

## 2017-07-03 RX ADMIN — TRAMADOL HYDROCHLORIDE 50 MG: 50 TABLET, FILM COATED ORAL at 00:42

## 2017-07-03 RX ADMIN — PANTOPRAZOLE SODIUM 40 MG: 40 TABLET, DELAYED RELEASE ORAL at 07:15

## 2017-07-03 RX ADMIN — Medication 4 ML: at 20:35

## 2017-07-03 RX ADMIN — TRAMADOL HYDROCHLORIDE 50 MG: 50 TABLET, FILM COATED ORAL at 23:49

## 2017-07-03 RX ADMIN — ENOXAPARIN SODIUM 40 MG: 40 INJECTION SUBCUTANEOUS at 08:57

## 2017-07-03 RX ADMIN — Medication 4 ML: at 08:30

## 2017-07-03 RX ADMIN — IPRATROPIUM BROMIDE AND ALBUTEROL SULFATE 3 ML: .5; 3 SOLUTION RESPIRATORY (INHALATION) at 23:38

## 2017-07-03 RX ADMIN — IPRATROPIUM BROMIDE AND ALBUTEROL SULFATE 3 ML: .5; 3 SOLUTION RESPIRATORY (INHALATION) at 05:17

## 2017-07-03 RX ADMIN — GUAIFENESIN AND DEXTROMETHORPHAN 5 ML: 100; 10 SYRUP ORAL at 23:27

## 2017-07-03 RX ADMIN — IPRATROPIUM BROMIDE AND ALBUTEROL SULFATE 3 ML: .5; 3 SOLUTION RESPIRATORY (INHALATION) at 16:39

## 2017-07-03 RX ADMIN — IPRATROPIUM BROMIDE AND ALBUTEROL SULFATE 3 ML: .5; 3 SOLUTION RESPIRATORY (INHALATION) at 20:35

## 2017-07-03 RX ADMIN — CEFAZOLIN SODIUM 2 G: 2 INJECTION, SOLUTION INTRAVENOUS at 16:49

## 2017-07-03 RX ADMIN — DOCUSATE SODIUM 100 MG: 100 CAPSULE, LIQUID FILLED ORAL at 18:07

## 2017-07-03 RX ADMIN — TRAMADOL HYDROCHLORIDE 50 MG: 50 TABLET, FILM COATED ORAL at 09:10

## 2017-07-03 RX ADMIN — DOCUSATE SODIUM 100 MG: 100 CAPSULE, LIQUID FILLED ORAL at 08:56

## 2017-07-03 RX ADMIN — ROPINIROLE 1 MG: 1 TABLET, FILM COATED ORAL at 20:45

## 2017-07-03 RX ADMIN — CEFAZOLIN SODIUM 2 G: 2 INJECTION, SOLUTION INTRAVENOUS at 08:57

## 2017-07-03 RX ADMIN — IPRATROPIUM BROMIDE AND ALBUTEROL SULFATE 3 ML: .5; 3 SOLUTION RESPIRATORY (INHALATION) at 08:27

## 2017-07-03 RX ADMIN — TRAMADOL HYDROCHLORIDE 50 MG: 50 TABLET, FILM COATED ORAL at 16:49

## 2017-07-03 RX ADMIN — GUAIFENESIN AND DEXTROMETHORPHAN 5 ML: 100; 10 SYRUP ORAL at 10:48

## 2017-07-04 ENCOUNTER — APPOINTMENT (OUTPATIENT)
Dept: CT IMAGING | Facility: HOSPITAL | Age: 77
End: 2017-07-04

## 2017-07-04 VITALS
HEART RATE: 95 BPM | TEMPERATURE: 97.8 F | HEIGHT: 71 IN | WEIGHT: 167.8 LBS | SYSTOLIC BLOOD PRESSURE: 144 MMHG | OXYGEN SATURATION: 94 % | BODY MASS INDEX: 23.49 KG/M2 | DIASTOLIC BLOOD PRESSURE: 73 MMHG | RESPIRATION RATE: 16 BRPM

## 2017-07-04 PROCEDURE — 71250 CT THORAX DX C-: CPT

## 2017-07-04 PROCEDURE — 94799 UNLISTED PULMONARY SVC/PX: CPT

## 2017-07-04 PROCEDURE — 25010000003 CEFAZOLIN IN DEXTROSE 2-4 GM/100ML-% SOLUTION: Performed by: INTERNAL MEDICINE

## 2017-07-04 PROCEDURE — 25010000002 ENOXAPARIN PER 10 MG: Performed by: NURSE PRACTITIONER

## 2017-07-04 RX ORDER — TRAMADOL HYDROCHLORIDE 50 MG/1
50 TABLET ORAL EVERY 8 HOURS PRN
Qty: 60 TABLET | Refills: 0 | Status: SHIPPED | OUTPATIENT
Start: 2017-07-04 | End: 2019-09-20

## 2017-07-04 RX ORDER — CEFAZOLIN SODIUM 2 G/100ML
2 INJECTION, SOLUTION INTRAVENOUS EVERY 8 HOURS
Qty: 6900 ML | Refills: 0 | Status: SHIPPED | OUTPATIENT
Start: 2017-07-04 | End: 2017-07-27

## 2017-07-04 RX ADMIN — MULTIPLE VITAMINS W/ MINERALS TAB 1 TABLET: TAB at 08:00

## 2017-07-04 RX ADMIN — MENTHOL, METHYL SALICYLATE: 10; 15 CREAM TOPICAL at 10:49

## 2017-07-04 RX ADMIN — Medication 4 ML: at 07:40

## 2017-07-04 RX ADMIN — IPRATROPIUM BROMIDE AND ALBUTEROL SULFATE 3 ML: .5; 3 SOLUTION RESPIRATORY (INHALATION) at 07:35

## 2017-07-04 RX ADMIN — ENOXAPARIN SODIUM 40 MG: 40 INJECTION SUBCUTANEOUS at 08:00

## 2017-07-04 RX ADMIN — MENTHOL, METHYL SALICYLATE: 10; 15 CREAM TOPICAL at 07:51

## 2017-07-04 RX ADMIN — IPRATROPIUM BROMIDE AND ALBUTEROL SULFATE 3 ML: .5; 3 SOLUTION RESPIRATORY (INHALATION) at 11:25

## 2017-07-04 RX ADMIN — MENTHOL, METHYL SALICYLATE 1 APPLICATION: 10; 15 CREAM TOPICAL at 04:31

## 2017-07-04 RX ADMIN — CEFAZOLIN SODIUM 2 G: 2 INJECTION, SOLUTION INTRAVENOUS at 07:51

## 2017-07-04 RX ADMIN — CEFAZOLIN SODIUM 2 G: 2 INJECTION, SOLUTION INTRAVENOUS at 01:08

## 2017-07-04 RX ADMIN — GUAIFENESIN AND DEXTROMETHORPHAN 5 ML: 100; 10 SYRUP ORAL at 10:35

## 2017-07-04 RX ADMIN — DOCUSATE SODIUM 100 MG: 100 CAPSULE, LIQUID FILLED ORAL at 08:05

## 2017-07-04 RX ADMIN — PANTOPRAZOLE SODIUM 40 MG: 40 TABLET, DELAYED RELEASE ORAL at 07:51

## 2017-07-04 RX ADMIN — TRAMADOL HYDROCHLORIDE 50 MG: 50 TABLET, FILM COATED ORAL at 12:47

## 2017-07-05 LAB
BACTERIA SPEC AEROBE CULT: NORMAL
BACTERIA SPEC AEROBE CULT: NORMAL

## 2017-07-06 ENCOUNTER — TELEPHONE (OUTPATIENT)
Dept: FAMILY MEDICINE CLINIC | Facility: CLINIC | Age: 77
End: 2017-07-06

## 2017-07-06 RX ORDER — TIZANIDINE HYDROCHLORIDE 4 MG/1
4 CAPSULE, GELATIN COATED ORAL 3 TIMES DAILY PRN
Qty: 90 CAPSULE | Refills: 2 | Status: ON HOLD | OUTPATIENT
Start: 2017-07-06 | End: 2018-01-29

## 2017-07-06 NOTE — TELEPHONE ENCOUNTER
Pt is on flexeril and tramadol [facility] ([facility]--[phone]) states level 2 interaction do you want them to make any changes?

## 2017-07-12 ENCOUNTER — TELEPHONE (OUTPATIENT)
Dept: INFECTIOUS DISEASES | Facility: CLINIC | Age: 77
End: 2017-07-12

## 2017-07-12 NOTE — TELEPHONE ENCOUNTER
Please see Eosin % on labs from 7/10/17. I sent them to you in EPIC. He is not complaining of any symptoms. Thanks, Magali

## 2017-07-14 ENCOUNTER — TELEPHONE (OUTPATIENT)
Dept: FAMILY MEDICINE CLINIC | Facility: CLINIC | Age: 77
End: 2017-07-14

## 2017-07-22 ENCOUNTER — OFFICE VISIT (OUTPATIENT)
Dept: FAMILY MEDICINE CLINIC | Facility: CLINIC | Age: 77
End: 2017-07-22

## 2017-07-22 VITALS
OXYGEN SATURATION: 96 % | BODY MASS INDEX: 23.4 KG/M2 | DIASTOLIC BLOOD PRESSURE: 66 MMHG | SYSTOLIC BLOOD PRESSURE: 102 MMHG | TEMPERATURE: 98 F | WEIGHT: 167.8 LBS | HEART RATE: 68 BPM

## 2017-07-22 DIAGNOSIS — R60.0 BILATERAL LEG EDEMA: Primary | ICD-10-CM

## 2017-07-22 DIAGNOSIS — J96.21 ACUTE ON CHRONIC RESPIRATORY FAILURE WITH HYPOXIA (HCC): ICD-10-CM

## 2017-07-22 DIAGNOSIS — A49.01 MSSA (METHICILLIN SUSCEPTIBLE STAPHYLOCOCCUS AUREUS): ICD-10-CM

## 2017-07-22 DIAGNOSIS — R06.02 SHORTNESS OF BREATH: ICD-10-CM

## 2017-07-22 DIAGNOSIS — J44.1 COPD EXACERBATION (HCC): ICD-10-CM

## 2017-07-22 DIAGNOSIS — R05.9 COUGH: ICD-10-CM

## 2017-07-22 LAB
ERYTHROCYTE [DISTWIDTH] IN BLOOD BY AUTOMATED COUNT: 13.5 % (ref 4.5–15)
HCT VFR BLD AUTO: 39.2 % (ref 35–60)
HGB BLD-MCNC: 12.7 G/DL (ref 13.5–18)
LYMPHOCYTES # BLD AUTO: 1.6 10*3/MM3 (ref 1.2–3.4)
LYMPHOCYTES NFR BLD AUTO: 19.7 % (ref 21–51)
MCH RBC QN AUTO: 29.8 PG (ref 26.1–33.1)
MCHC RBC AUTO-ENTMCNC: 32.3 G/DL (ref 33–37)
MCV RBC AUTO: 92.3 FL (ref 80–99)
MONOCYTES # BLD AUTO: 0.7 10*3/MM3 (ref 0.1–0.6)
MONOCYTES NFR BLD AUTO: 9.1 % (ref 2–9)
NEUTROPHILS # BLD AUTO: 5.8 10*3/MM3 (ref 1.4–6.5)
NEUTROPHILS NFR BLD AUTO: 71.2 % (ref 42–75)
NT-PROBNP SERPL-MCNC: 186.3 PG/ML (ref 0–1800)
PLATELET # BLD AUTO: 326 10*3/MM3 (ref 150–450)
PMV BLD AUTO: 6.9 FL (ref 7.1–10.5)
RBC # BLD AUTO: 4.24 10*6/MM3 (ref 4–6)
WBC NRBC COR # BLD: 8.1 10*3/MM3 (ref 4.5–10)

## 2017-07-22 PROCEDURE — 99213 OFFICE O/P EST LOW 20 MIN: CPT | Performed by: NURSE PRACTITIONER

## 2017-07-22 PROCEDURE — 83880 ASSAY OF NATRIURETIC PEPTIDE: CPT | Performed by: NURSE PRACTITIONER

## 2017-07-22 PROCEDURE — 36415 COLL VENOUS BLD VENIPUNCTURE: CPT | Performed by: NURSE PRACTITIONER

## 2017-07-22 PROCEDURE — 85025 COMPLETE CBC W/AUTO DIFF WBC: CPT | Performed by: NURSE PRACTITIONER

## 2017-07-22 RX ORDER — POTASSIUM CHLORIDE 750 MG/1
10 TABLET, EXTENDED RELEASE ORAL DAILY
Qty: 30 TABLET | Refills: 1 | Status: SHIPPED | OUTPATIENT
Start: 2017-07-22 | End: 2018-01-03 | Stop reason: SDUPTHER

## 2017-07-22 RX ORDER — CETIRIZINE HYDROCHLORIDE 10 MG/1
10 TABLET ORAL DAILY PRN
COMMUNITY
End: 2018-03-30 | Stop reason: SDUPTHER

## 2017-07-22 RX ORDER — FUROSEMIDE 20 MG/1
20 TABLET ORAL DAILY
Qty: 30 TABLET | Refills: 1 | Status: SHIPPED | OUTPATIENT
Start: 2017-07-22 | End: 2019-04-18 | Stop reason: ALTCHOICE

## 2017-07-22 RX ORDER — TRIAMCINOLONE ACETONIDE 1 MG/G
1 CREAM TOPICAL 2 TIMES DAILY
COMMUNITY
Start: 2017-07-19 | End: 2018-02-01 | Stop reason: HOSPADM

## 2017-07-22 NOTE — PATIENT INSTRUCTIONS
unable to do CXR as no technician today, discussed going to back to ER but patient refuses, states SOA improved on oxygen and will walk him out to car and make sure he wears mobile oxygen home, check labs and call with results, trial lasix 20 mg 1 PO AM with potassium 10 mEq AM, may cause increased urination and pt states would like to try 1/2 PO over the weekend, CBC improving cont abx and FU with pulm and infectious dz, if sx worsen over the weekend go ER, pt verbalized understanding

## 2017-07-22 NOTE — PROGRESS NOTES
Subjective   Tony Leonard is a 77 y.o. male.     History of Present Illness   C/o B LE edema x 2 mo, has home health RN and made apt as she was concerned, never been on diuretic, no CP dizziness HA  C/o cough yellowish with SOA worsening this AM with Sa02 today 84-88% didn't bring mobile oxygen into clinic and left in car, now with our oxygen on Sa02 up to 96%, lives alone in apartment and goes up and down stairs, With last hospitalization 06/28/17 for MSSA bacteremia source uncertain currently on cefazolin x 1 mo and has pending FU with infectious dz Dr Mitchell 07/27/17, with acute on chronic hypoxemic respiratory failure with chronic nocturnal hypoxemia seeing pulm pending FU 08/17, bronchiectasis with acute exacerbation, COPD, with last labs 07/18/17 CBC, CMP, CRP reviewed and improving    The following portions of the patient's history were reviewed and updated as appropriate: allergies, current medications, past family history, past medical history, past social history, past surgical history and problem list.    Review of Systems   Constitutional: Positive for fatigue. Negative for fever.   Respiratory: Positive for cough and shortness of breath. Negative for chest tightness and wheezing.    Cardiovascular: Positive for leg swelling. Negative for chest pain and palpitations.   Musculoskeletal: Positive for back pain and gait problem.   Neurological: Negative for dizziness and headaches.   All other systems reviewed and are negative.      Objective   Physical Exam   Constitutional: He is oriented to person, place, and time. He appears well-developed and well-nourished.   HENT:   Head: Normocephalic and atraumatic.   Eyes: Conjunctivae and EOM are normal. Pupils are equal, round, and reactive to light.   Cardiovascular: Normal rate, regular rhythm and normal heart sounds.    Pulmonary/Chest: Effort normal and breath sounds normal.   Musculoskeletal: Normal range of motion. He exhibits edema (B LE pitting).    Gait stiff, walking with cane   Neurological: He is alert and oriented to person, place, and time.   Skin: Skin is warm and dry.   Psychiatric: He has a normal mood and affect. His behavior is normal. Judgment and thought content normal.   Vitals reviewed.      Assessment/Plan   Tony was seen today for leg swelling and cough.    Diagnoses and all orders for this visit:    Bilateral leg edema  -     proBNP  -     Basic Metabolic Panel    Acute on chronic respiratory failure with hypoxia  -     Cancel: XR Chest PA & Lateral    COPD exacerbation  -     Cancel: XR Chest PA & Lateral    MSSA (methicillin susceptible Staphylococcus aureus)  -     CBC & Differential  -     CBC Auto Differential    Shortness of breath  -     Cancel: XR Chest PA & Lateral    Cough  -     Cancel: XR Chest PA & Lateral    Other orders  -     furosemide (LASIX) 20 MG tablet; Take 1 tablet by mouth Daily. Take with potassium  -     potassium chloride (K-DUR,KLOR-CON) 10 MEQ CR tablet; Take 1 tablet by mouth Daily.    unable to do CXR as no technician today, discussed going to back to ER but patient refuses, states SOA improved on oxygen and will walk him out to car and make sure he wears mobile oxygen home, check labs and call with results, trial lasix 20 mg 1 PO AM with potassium 10 mEq AM, may cause increased urination and pt states would like to try 1/2 PO over the weekend, CBC improving cont abx and FU with pulm and infectious dz, if sx worsen over the weekend go ER, pt verbalized understanding

## 2017-07-24 ENCOUNTER — TELEPHONE (OUTPATIENT)
Dept: FAMILY MEDICINE CLINIC | Facility: CLINIC | Age: 77
End: 2017-07-24

## 2017-07-24 NOTE — TELEPHONE ENCOUNTER
Fluid volume about the same when last checked in hospital last month, how is B LE swelling on lasix and potassium AM?

## 2017-07-24 NOTE — TELEPHONE ENCOUNTER
Pt informed of lab results. Pt stated it was hard to tell about the swelling because he hasn't got on a good schedule to take meds and they make him go to the bathroom a lot.

## 2017-07-27 ENCOUNTER — OFFICE VISIT (OUTPATIENT)
Dept: INFECTIOUS DISEASES | Facility: CLINIC | Age: 77
End: 2017-07-27

## 2017-07-27 VITALS
WEIGHT: 164.4 LBS | OXYGEN SATURATION: 93 % | TEMPERATURE: 97.6 F | HEIGHT: 71 IN | SYSTOLIC BLOOD PRESSURE: 97 MMHG | HEART RATE: 83 BPM | BODY MASS INDEX: 23.02 KG/M2 | DIASTOLIC BLOOD PRESSURE: 64 MMHG

## 2017-07-27 DIAGNOSIS — D72.10 EOSINOPHILIA: ICD-10-CM

## 2017-07-27 DIAGNOSIS — Z79.2 LONG TERM CURRENT USE OF ANTIBIOTICS: ICD-10-CM

## 2017-07-27 DIAGNOSIS — A41.01 MSSA (METHICILLIN SUSCEPTIBLE STAPHYLOCOCCUS AUREUS) SEPTICEMIA (HCC): Primary | ICD-10-CM

## 2017-07-27 DIAGNOSIS — J47.9 BRONCHIECTASIS WITHOUT COMPLICATION (HCC): ICD-10-CM

## 2017-07-27 LAB — FUNGUS WND CULT: NORMAL

## 2017-07-27 PROCEDURE — 99214 OFFICE O/P EST MOD 30 MIN: CPT | Performed by: INTERNAL MEDICINE

## 2017-07-27 RX ORDER — TRAMADOL HYDROCHLORIDE 50 MG/1
50 TABLET ORAL EVERY 8 HOURS PRN
COMMUNITY
End: 2018-02-01 | Stop reason: HOSPADM

## 2017-07-27 NOTE — PROGRESS NOTES
"cc: f/u MSSA sepsis    Per prior notes: Very nice 77-year-old admitted on 6/28/17 with acute on chronic hypoxic/hypercapnic respiratory failure secondary to COPD and bronchiectasis with mucus plugging.  Underwent bronchoscopy on 6/29 which grew normal cyndee but admission blood culture one out of one set grew methicillin sensitive Staphylococcus aureus.  Surface transthoracic echocardiogram was negative.  No obvious etiology for bacteremia but given her cold sores we'll treat with 4 weeks of cefazolin with an anticipated stop date is 7/27.    Since discharge, he has had recurrent episodes of dyspnea and low energy.  He still has a cough productive of sputum which is not unusual for him over the last 6 months.  He has some mild rhinorrhea but no sinus drainage he says.  In terms of his MSSA sepsis, he is not having any systemic symptoms of infection such as fever, chills, night sweats.  He actually feels reasonably well today and says this is his best day in some time.  He also had some eosinophilia on his and about monitoring labs and his complaint of some pruritus.  In regards to his long-term use antibiotics, he denies any missed doses are known side effects but for the pruritus.    Past medical history: Chronic respiratory failure secondary to COPD and bronchiectasis, ADHD, diverticulosis, frequent admission for respiratory exacerbations, spine surgery without placement of metal, polio in youth, tonsillectomy      Review of Systems: All other reviewed and negative except as per HPI    Blood pressure 97/64, pulse 83, temperature 97.6 °F (36.4 °C), height 71\" (180.3 cm), weight 164 lb 6.4 oz (74.6 kg), SpO2 93 %.  GENERAL: Awake and alert, in no acute distress.   LUNGS: Clear to auscultation anteriorly with normal respiratory effort.   SKIN: Warm and dry without cutaneous eruptions   PSYCHIATRIC: Appropriate mood, affect, insight, and judgment.       DIAGNOSTICS:    Antibiotic monitoring labs reviewed creatinine was " 0.89.  White count 9.0, 49% neutrophils, 21% lymphocytes, 20% eosinophils.  Platelets 309.  Hematocrit 40    Assessment and Plan  1.  MSSA septicemia  -Now status post 4 weeks of IV cefazolin.  No evidence of persistent/recrudescent infection  -Stop cefazolin now and remove PICC line today in clinic.    -Discussed with him signs and symptoms of recrudescent infection and when to seek medical attention    2.  Bronchiectasis  -Continue airway clearance measures as per pulmonary.  Tough condition without any magic bullets.  Offered nasal steroid for suspected allergic rhinosinusitis, but he declined.  Continue oral antihistamine    3.  Long-term use antibiotics, discontinuing as above    4.  Eosinophilia  -Likely secondary to antibiotic use  -We'll repeat CBC at next clinic visit    Return to clinic in 2 months or sooner if needed

## 2017-08-01 ENCOUNTER — TELEPHONE (OUTPATIENT)
Dept: FAMILY MEDICINE CLINIC | Facility: CLINIC | Age: 77
End: 2017-08-01

## 2017-08-01 RX ORDER — METHYLPHENIDATE HYDROCHLORIDE 10 MG/1
TABLET ORAL
Qty: 30 TABLET | Refills: 0 | Status: SHIPPED | OUTPATIENT
Start: 2017-08-01 | End: 2017-09-07 | Stop reason: SDUPTHER

## 2017-08-30 ENCOUNTER — TELEPHONE (OUTPATIENT)
Dept: GASTROENTEROLOGY | Facility: CLINIC | Age: 77
End: 2017-08-30

## 2017-08-30 NOTE — TELEPHONE ENCOUNTER
Faxed request received from [facility] - Nexium a nonpreferred med.  Per Ulises Nunn to try Pantoprazole 40 mg 1 tab po daily.  Form completed and faxed to  - confirmation received.

## 2017-09-07 ENCOUNTER — OFFICE VISIT (OUTPATIENT)
Dept: FAMILY MEDICINE CLINIC | Facility: CLINIC | Age: 77
End: 2017-09-07

## 2017-09-07 VITALS
WEIGHT: 168 LBS | HEART RATE: 75 BPM | SYSTOLIC BLOOD PRESSURE: 92 MMHG | HEIGHT: 71 IN | DIASTOLIC BLOOD PRESSURE: 58 MMHG | TEMPERATURE: 97.9 F | RESPIRATION RATE: 15 BRPM | OXYGEN SATURATION: 95 % | BODY MASS INDEX: 23.52 KG/M2

## 2017-09-07 DIAGNOSIS — J44.9 CHRONIC OBSTRUCTIVE PULMONARY DISEASE, UNSPECIFIED COPD TYPE (HCC): ICD-10-CM

## 2017-09-07 DIAGNOSIS — J47.0 BRONCHIECTASIS WITH ACUTE LOWER RESPIRATORY INFECTION (HCC): ICD-10-CM

## 2017-09-07 DIAGNOSIS — Z00.00 MEDICARE ANNUAL WELLNESS VISIT, INITIAL: Primary | ICD-10-CM

## 2017-09-07 DIAGNOSIS — F98.8 ADD (ATTENTION DEFICIT DISORDER): ICD-10-CM

## 2017-09-07 PROCEDURE — 99214 OFFICE O/P EST MOD 30 MIN: CPT | Performed by: INTERNAL MEDICINE

## 2017-09-07 PROCEDURE — G0438 PPPS, INITIAL VISIT: HCPCS | Performed by: INTERNAL MEDICINE

## 2017-09-07 RX ORDER — MULTIVIT WITH MINERALS/LUTEIN
250 TABLET ORAL DAILY
COMMUNITY
End: 2018-02-28

## 2017-09-07 RX ORDER — MELATONIN
1000 DAILY
COMMUNITY
End: 2019-02-07

## 2017-09-07 RX ORDER — METHYLPHENIDATE HYDROCHLORIDE 10 MG/1
TABLET ORAL
Qty: 30 TABLET | Refills: 0 | Status: SHIPPED | OUTPATIENT
Start: 2017-09-07 | End: 2017-10-13 | Stop reason: SDUPTHER

## 2017-09-07 NOTE — PROGRESS NOTES
QUICK REFERENCE INFORMATION:  The ABCs of the Annual Wellness Visit    Initial Medicare Wellness Visit    HEALTH RISK ASSESSMENT    1940    Recent Hospitalizations:  Recently treated at the following:  [facility].        Current Medical Providers:  Patient Care Team:  Erich Aviles MD as PCP - General (Internal Medicine)  Erich Aviles MD as PCP - Claims Attributed  Pinky Gutierres RN as Care Coordinator (Population Health)        Smoking Status:  History   Smoking Status   • Never Smoker   Smokeless Tobacco   • Never Used       Alcohol Consumption:  History   Alcohol Use   • Yes     Comment: red wine occassional       Depression Screen:   PHQ-2/PHQ-9 Depression Screening 9/7/2017   Little interest or pleasure in doing things 0   Feeling down, depressed, or hopeless 0   Trouble falling or staying asleep, or sleeping too much 0   Feeling tired or having little energy 0   Poor appetite or overeating 0   Feeling bad about yourself - or that you are a failure or have let yourself or your family down 0   Trouble concentrating on things, such as reading the newspaper or watching television 0   Moving or speaking so slowly that other people could have noticed. Or the opposite - being so fidgety or restless that you have been moving around a lot more than usual 0   Thoughts that you would be better off dead, or of hurting yourself in some way 0   Total Score 0   If you checked off any problems, how difficult have these problems made it for you to do your work, take care of things at home, or get along with other people? Not difficult at all       Health Habits and Functional and Cognitive Screening:  Functional & Cognitive Status 9/7/2017   Do you have difficulty preparing food and eating? No   Do you have difficulty bathing yourself? No   Do you have difficulty getting dressed? No   Do you have difficulty using the toilet? No   Do you have difficulty moving around from place to place? No   In  the past year have you fallen or experienced a near fall? No   Do you need help using the phone?  No   Are you deaf or do you have serious difficulty hearing?  No   Do you need help with transportation? No   Do you need help shopping? No   Do you need help preparing meals?  No   Do you need help with housework?  No   Do you need help with laundry? No   Do you need help taking your medications? No   Do you need help managing money? No   Do you have difficulty concentrating, remembering or making decisions? No       Health Habits  Current Diet: Well Balanced Diet  Dental Exam: Up to date  Eye Exam: Up to date  Exercise (times per week): 7 times per week  Current Exercise Activities Include: Walking          Does the patient have evidence of cognitive impairment? No    Asiprin use counseling: Does not need ASA (and currently is not on it)      Recent Lab Results:    Visual Acuity:  No exam data present    Age-appropriate Screening Schedule:  Refer to the list below for future screening recommendations based on patient's age, sex and/or medical conditions. Orders for these recommended tests are listed in the plan section. The patient has been provided with a written plan.    Health Maintenance   Topic Date Due   • PROSTATE CANCER SCREENING  03/02/2018   • TDAP/TD VACCINES (2 - Td) 01/01/2023   • INFLUENZA VACCINE  Addressed   • PNEUMOCOCCAL VACCINES (65+ LOW/MEDIUM RISK)  Completed   • ZOSTER VACCINE  Completed        Subjective   History of Present Illness    Tony Leonard is a 77 y.o. male who presents for an Annual Wellness Visit.    The following portions of the patient's history were reviewed and updated as appropriate: allergies, current medications, past family history, past medical history, past social history, past surgical history and problem list.    Outpatient Medications Prior to Visit   Medication Sig Dispense Refill   • albuterol (PROVENTIL) (2.5 MG/3ML) 0.083% nebulizer solution Use with nebulizer  twice a day with aerobika device and 7% saline and may use q 4hrs prn wheezing or  vial 11   • ANORO ELLIPTA 62.5-25 MCG/INH aerosol powder  USE 1 INHALATION (62.5 MCG) DAILY 1 each 3   • cetirizine (zyrTEC) 10 MG tablet Take 10 mg by mouth Daily.     • FLUoxetine (PROzac) 60 MG tablet Take 1 tablet by mouth Daily. (Patient taking differently: Take 60 mg by mouth Daily As Needed.) 30 tablet 3   • Multiple Vitamins-Minerals (MULTIVITAMIN ADULT PO) Take  by mouth Daily.     • rOPINIRole (REQUIP) 1 MG tablet Take 1 mg by mouth Every Night. Take 1 hour before bedtime.     • sodium chloride 7 % nebulizer solution nebulizer solution Take 4 mL by nebulization 2 (Two) Times a Day. 240 mL 1   • TiZANidine (ZANAFLEX) 4 MG capsule Take 1 capsule by mouth 3 (Three) Times a Day As Needed for Muscle Spasms. 90 capsule 2   • traMADol (ULTRAM) 50 MG tablet Take 50 mg by mouth Every 6 (Six) Hours As Needed for Moderate Pain (4-6).     • triamcinolone (KENALOG) 0.1 % cream      • methylphenidate (RITALIN) 10 MG tablet 1 po qday ADD 30 tablet 0   • furosemide (LASIX) 20 MG tablet Take 1 tablet by mouth Daily. Take with potassium 30 tablet 1   • NEXIUM 40 MG capsule TAKE 1 CAPSULE EVERY MORNING BEFORE BREAKFAST 90 capsule 1   • potassium chloride (K-DUR,KLOR-CON) 10 MEQ CR tablet Take 1 tablet by mouth Daily. 30 tablet 1   • benzonatate (TESSALON) 200 MG capsule Take 1 capsule by mouth 2 (Two) Times a Day As Needed for Cough. 60 capsule 2     No facility-administered medications prior to visit.        Patient Active Problem List   Diagnosis   • Thrush, oral   • ADD (attention deficit disorder)   • Hemorrhoids   • Bronchiectasis with acute lower respiratory infection   • Diverticul disease small and large intestine, no perforati or abscess   • Benign non-nodular prostatic hyperplasia without lower urinary tract symptoms   • Gastroesophageal reflux disease   • Malaise and fatigue   • Environmental allergies   • Hemoptysis   •  "Dyslipidemia   • Primary osteoarthritis involving multiple joints   • Pneumonia of left upper lobe due to infectious organism   • Abnormal EKG   • Chronic respiratory failure with hypoxia   • COPD (chronic obstructive pulmonary disease)   • Pneumonia of left lower lobe due to infectious organism   • Mild malnutrition   • Acute respiratory failure with hypoxia   • Bronchitis   • COPD exacerbation       Advance Care Planning:  has NO advance directive - not interested in additional information    Identification of Risk Factors:  Risk factors include: NA.    Review of Systems    Compared to one year ago, the patient feels his physical health is better.  Compared to one year ago, the patient feels his mental health is better.    Objective     Physical Exam    Vitals:    09/07/17 1435   BP: 92/58   BP Location: Left arm   Patient Position: Sitting   Cuff Size: Adult   Pulse: 75   Resp: 15   Temp: 97.9 °F (36.6 °C)   TempSrc: Oral   SpO2: 95%   Weight: 168 lb (76.2 kg)   Height: 71\" (180.3 cm)   PainSc:   5   PainLoc: Hip       Body mass index is 23.43 kg/(m^2).  Discussed the patient's BMI with him. The BMI is in the acceptable range.    Assessment/Plan   Patient Self-Management and Personalized Health Advice  The patient has been provided with information about: NA and preventive services including:   · Advance directive.    Visit Diagnoses:    ICD-10-CM ICD-9-CM   1. Bronchiectasis with acute lower respiratory infection J47.0 494.1   2. Chronic obstructive pulmonary disease, unspecified COPD type J44.9 496   3. ADD (attention deficit disorder) F98.8 314.00       No orders of the defined types were placed in this encounter.      Outpatient Encounter Prescriptions as of 9/7/2017   Medication Sig Dispense Refill   • albuterol (PROVENTIL) (2.5 MG/3ML) 0.083% nebulizer solution Use with nebulizer twice a day with aerobika device and 7% saline and may use q 4hrs prn wheezing or  vial 11   • ANORO ELLIPTA 62.5-25 " MCG/INH aerosol powder  USE 1 INHALATION (62.5 MCG) DAILY 1 each 3   • cetirizine (zyrTEC) 10 MG tablet Take 10 mg by mouth Daily.     • cholecalciferol (VITAMIN D3) 1000 units tablet Take 1,000 Units by mouth Daily.     • FLUoxetine (PROzac) 60 MG tablet Take 1 tablet by mouth Daily. (Patient taking differently: Take 60 mg by mouth Daily As Needed.) 30 tablet 3   • methylphenidate (RITALIN) 10 MG tablet 1 po qday ADD 30 tablet 0   • Multiple Vitamins-Minerals (MULTIVITAMIN ADULT PO) Take  by mouth Daily.     • rOPINIRole (REQUIP) 1 MG tablet Take 1 mg by mouth Every Night. Take 1 hour before bedtime.     • sodium chloride 7 % nebulizer solution nebulizer solution Take 4 mL by nebulization 2 (Two) Times a Day. 240 mL 1   • TiZANidine (ZANAFLEX) 4 MG capsule Take 1 capsule by mouth 3 (Three) Times a Day As Needed for Muscle Spasms. 90 capsule 2   • traMADol (ULTRAM) 50 MG tablet Take 50 mg by mouth Every 6 (Six) Hours As Needed for Moderate Pain (4-6).     • triamcinolone (KENALOG) 0.1 % cream      • vitamin C (ASCORBIC ACID) 250 MG tablet Take 250 mg by mouth Daily.     • [DISCONTINUED] methylphenidate (RITALIN) 10 MG tablet 1 po qday ADD 30 tablet 0   • furosemide (LASIX) 20 MG tablet Take 1 tablet by mouth Daily. Take with potassium 30 tablet 1   • NEXIUM 40 MG capsule TAKE 1 CAPSULE EVERY MORNING BEFORE BREAKFAST 90 capsule 1   • potassium chloride (K-DUR,KLOR-CON) 10 MEQ CR tablet Take 1 tablet by mouth Daily. 30 tablet 1   • [DISCONTINUED] benzonatate (TESSALON) 200 MG capsule Take 1 capsule by mouth 2 (Two) Times a Day As Needed for Cough. 60 capsule 2     No facility-administered encounter medications on file as of 9/7/2017.        Reviewed use of high risk medication in the elderly: not applicable  Reviewed for potential of harmful drug interactions in the elderly: not applicable    Follow Up:  No Follow-up on file.     An After Visit Summary and PPPS with all of these plans were given to the patient.

## 2017-09-07 NOTE — PROGRESS NOTES
Subjective   Tony Leonard is a 77 y.o. male.     History of Present Illness   Patient was seen today for a Medicare wellness exam.  He was recently discharged from [facility] for bronchiectasis.  A she is extremely stable this time is no issues with wheezing or productive cough.  He did need a refill on his medication for ADD.  His COPD is been well-controlled with his bronchodilators.    Much of this encounter note is an electronic transcription/translation of spoken language to printed text.  The electronic translation of spoken language may permit erroneous, or at times, nonsensical words or phrases to be inadvertently transcribed.  Although I have reviewed the note for such errors, some may still exist.  The following portions of the patient's history were reviewed and updated as appropriate: allergies, current medications, past family history, past medical history, past social history, past surgical history and problem list.    Review of Systems   Constitutional: Negative for fatigue and fever.   HENT: Positive for congestion. Negative for trouble swallowing.    Eyes: Negative for discharge and visual disturbance.   Respiratory: Negative for choking and shortness of breath.    Cardiovascular: Negative for chest pain and palpitations.   Gastrointestinal: Negative for abdominal pain and blood in stool.   Endocrine: Negative.    Genitourinary: Negative for genital sores and hematuria.   Musculoskeletal: Negative for gait problem and joint swelling.   Skin: Negative for color change, pallor, rash and wound.   Allergic/Immunologic: Positive for environmental allergies. Negative for immunocompromised state.   Neurological: Negative for facial asymmetry and speech difficulty.   Psychiatric/Behavioral: Negative for hallucinations and suicidal ideas.       Objective   Physical Exam   Constitutional: He is oriented to person, place, and time. He appears well-developed and well-nourished.   HENT:   Head:  Normocephalic.   Eyes: Conjunctivae are normal. Pupils are equal, round, and reactive to light.   Neck: Normal range of motion. Neck supple.   Cardiovascular: Normal rate, regular rhythm and normal heart sounds.  Exam reveals no gallop and no friction rub.    No murmur heard.  Pulmonary/Chest: Effort normal and breath sounds normal. No respiratory distress. He has no wheezes. He has no rales. He exhibits no tenderness.   Abdominal: Soft. Bowel sounds are normal.   Musculoskeletal: Normal range of motion.   Neurological: He is alert and oriented to person, place, and time.   Skin: Skin is warm and dry.   Psychiatric: He has a normal mood and affect. His behavior is normal. Judgment and thought content normal.   Nursing note and vitals reviewed.      Assessment/Plan   Problems Addressed this Visit        Respiratory    Bronchiectasis with acute lower respiratory infection    COPD (chronic obstructive pulmonary disease)       Other    ADD (attention deficit disorder)    Relevant Medications    methylphenidate (RITALIN) 10 MG tablet      Other Visit Diagnoses     Medicare annual wellness visit, initial    -  Primary

## 2017-09-07 NOTE — PATIENT INSTRUCTIONS
Medicare Wellness  Personal Prevention Plan of Service     Date of Office Visit:  2017  Encounter Provider:  Erich Aviles MD  Place of Service:  [facility]  Patient Name: Tony Leonard  :  1940    As part of the Medicare Wellness portion of your visit today, we are providing you with this personalized preventive plan of services (PPPS). This plan is based upon recommendations of the [place] Preventive Services Task Force (USPSTF) and the Advisory Committee on Immunization Practices (ACIP).    This lists the preventive care services that should be considered, and provides dates of when you are due. Items listed as completed are up-to-date and do not require any further intervention.    Health Maintenance   Topic Date Due   • MEDICARE ANNUAL WELLNESS  2018   • PROSTATE CANCER SCREENING  2018   • TDAP/TD VACCINES (2 - Td) 2023   • INFLUENZA VACCINE  Addressed   • PNEUMOCOCCAL VACCINES (65+ LOW/MEDIUM RISK)  Completed   • ZOSTER VACCINE  Completed       No orders of the defined types were placed in this encounter.      No Follow-up on file.

## 2017-10-03 ENCOUNTER — PATIENT OUTREACH (OUTPATIENT)
Dept: CASE MANAGEMENT | Facility: OTHER | Age: 77
End: 2017-10-03

## 2017-10-13 ENCOUNTER — APPOINTMENT (OUTPATIENT)
Dept: GENERAL RADIOLOGY | Facility: HOSPITAL | Age: 77
End: 2017-10-13

## 2017-10-13 ENCOUNTER — HOSPITAL ENCOUNTER (INPATIENT)
Facility: HOSPITAL | Age: 77
LOS: 4 days | Discharge: HOME | End: 2017-10-17
Attending: EMERGENCY MEDICINE | Admitting: INTERNAL MEDICINE

## 2017-10-13 ENCOUNTER — TELEPHONE (OUTPATIENT)
Dept: FAMILY MEDICINE CLINIC | Facility: CLINIC | Age: 77
End: 2017-10-13

## 2017-10-13 ENCOUNTER — OFFICE VISIT (OUTPATIENT)
Dept: FAMILY MEDICINE CLINIC | Facility: CLINIC | Age: 77
End: 2017-10-13

## 2017-10-13 VITALS
SYSTOLIC BLOOD PRESSURE: 122 MMHG | RESPIRATION RATE: 22 BRPM | HEIGHT: 71 IN | TEMPERATURE: 97.9 F | DIASTOLIC BLOOD PRESSURE: 60 MMHG | BODY MASS INDEX: 22.68 KG/M2 | WEIGHT: 162 LBS | HEART RATE: 81 BPM | OXYGEN SATURATION: 95 %

## 2017-10-13 DIAGNOSIS — J44.1 COPD EXACERBATION (HCC): Primary | ICD-10-CM

## 2017-10-13 DIAGNOSIS — R06.02 SOB (SHORTNESS OF BREATH): ICD-10-CM

## 2017-10-13 DIAGNOSIS — R05.9 COUGH: ICD-10-CM

## 2017-10-13 DIAGNOSIS — J44.9 CHRONIC OBSTRUCTIVE PULMONARY DISEASE, UNSPECIFIED COPD TYPE (HCC): Primary | ICD-10-CM

## 2017-10-13 DIAGNOSIS — J96.01 ACUTE HYPOXEMIC RESPIRATORY FAILURE (HCC): ICD-10-CM

## 2017-10-13 DIAGNOSIS — R60.0 BILATERAL LEG EDEMA: ICD-10-CM

## 2017-10-13 LAB
ALBUMIN SERPL-MCNC: 4.2 G/DL (ref 3.5–5.2)
ALBUMIN/GLOB SERPL: 1.5 G/DL
ALP SERPL-CCNC: 67 U/L (ref 39–117)
ALT SERPL W P-5'-P-CCNC: 15 U/L (ref 1–41)
ANION GAP SERPL CALCULATED.3IONS-SCNC: 12.5 MMOL/L
ARTERIAL PATENCY WRIST A: POSITIVE
AST SERPL-CCNC: 19 U/L (ref 1–40)
ATMOSPHERIC PRESS: 751.8 MMHG
B PERT DNA SPEC QL NAA+PROBE: NOT DETECTED
BASE EXCESS BLDA CALC-SCNC: 0 MMOL/L (ref 0–2)
BASOPHILS # BLD AUTO: 0.04 10*3/MM3 (ref 0–0.2)
BASOPHILS NFR BLD AUTO: 0.3 % (ref 0–1.5)
BDY SITE: ABNORMAL
BILIRUB SERPL-MCNC: 1.1 MG/DL (ref 0.1–1.2)
BUN BLD-MCNC: 17 MG/DL (ref 8–23)
BUN/CREAT SERPL: 17.5 (ref 7–25)
C PNEUM DNA NPH QL NAA+NON-PROBE: NOT DETECTED
CALCIUM SPEC-SCNC: 9.5 MG/DL (ref 8.6–10.5)
CHLORIDE SERPL-SCNC: 101 MMOL/L (ref 98–107)
CO2 SERPL-SCNC: 26.5 MMOL/L (ref 22–29)
CREAT BLD-MCNC: 0.97 MG/DL (ref 0.76–1.27)
D-LACTATE SERPL-SCNC: 1.5 MMOL/L (ref 0.5–2)
DEPRECATED RDW RBC AUTO: 47.8 FL (ref 37–54)
EOSINOPHIL # BLD AUTO: 0.75 10*3/MM3 (ref 0–0.7)
EOSINOPHIL NFR BLD AUTO: 4.9 % (ref 0.3–6.2)
ERYTHROCYTE [DISTWIDTH] IN BLOOD BY AUTOMATED COUNT: 13.8 % (ref 4.5–15)
ERYTHROCYTE [DISTWIDTH] IN BLOOD BY AUTOMATED COUNT: 14 % (ref 11.5–14.5)
FLUAV H1 2009 PAND RNA NPH QL NAA+PROBE: NOT DETECTED
FLUAV H1 HA GENE NPH QL NAA+PROBE: NOT DETECTED
FLUAV H3 RNA NPH QL NAA+PROBE: NOT DETECTED
FLUAV SUBTYP SPEC NAA+PROBE: NOT DETECTED
FLUBV RNA ISLT QL NAA+PROBE: NOT DETECTED
GFR SERPL CREATININE-BSD FRML MDRD: 75 ML/MIN/1.73
GLOBULIN UR ELPH-MCNC: 2.8 GM/DL
GLUCOSE BLD-MCNC: 119 MG/DL (ref 65–99)
HADV DNA SPEC NAA+PROBE: NOT DETECTED
HCO3 BLDA-SCNC: 24.3 MMOL/L (ref 22–28)
HCOV 229E RNA SPEC QL NAA+PROBE: NOT DETECTED
HCOV HKU1 RNA SPEC QL NAA+PROBE: NOT DETECTED
HCOV NL63 RNA SPEC QL NAA+PROBE: NOT DETECTED
HCOV OC43 RNA SPEC QL NAA+PROBE: NOT DETECTED
HCT VFR BLD AUTO: 43.1 % (ref 40.4–52.2)
HCT VFR BLD AUTO: 44.6 % (ref 35–60)
HGB BLD-MCNC: 14.5 G/DL (ref 13.7–17.6)
HGB BLD-MCNC: 14.9 G/DL (ref 13.5–18)
HMPV RNA NPH QL NAA+NON-PROBE: NOT DETECTED
HOLD SPECIMEN: NORMAL
HOLD SPECIMEN: NORMAL
HPIV1 RNA SPEC QL NAA+PROBE: NOT DETECTED
HPIV2 RNA SPEC QL NAA+PROBE: NOT DETECTED
HPIV3 RNA NPH QL NAA+PROBE: NOT DETECTED
HPIV4 P GENE NPH QL NAA+PROBE: NOT DETECTED
IMM GRANULOCYTES # BLD: 0.04 10*3/MM3 (ref 0–0.03)
IMM GRANULOCYTES NFR BLD: 0.3 % (ref 0–0.5)
LYMPHOCYTES # BLD AUTO: 1.58 10*3/MM3 (ref 0.9–4.8)
LYMPHOCYTES # BLD AUTO: 2.5 10*3/MM3 (ref 1.2–3.4)
LYMPHOCYTES NFR BLD AUTO: 10.4 % (ref 19.6–45.3)
LYMPHOCYTES NFR BLD AUTO: 26.1 % (ref 21–51)
M PNEUMO IGG SER IA-ACNC: NOT DETECTED
MCH RBC QN AUTO: 30.7 PG (ref 26.1–33.1)
MCH RBC QN AUTO: 31.7 PG (ref 27–32.7)
MCHC RBC AUTO-ENTMCNC: 33.3 G/DL (ref 33–37)
MCHC RBC AUTO-ENTMCNC: 33.6 G/DL (ref 32.6–36.4)
MCV RBC AUTO: 92 FL (ref 80–99)
MCV RBC AUTO: 94.3 FL (ref 79.8–96.2)
MODALITY: ABNORMAL
MONOCYTES # BLD AUTO: 0.2 10*3/MM3 (ref 0.1–0.6)
MONOCYTES # BLD AUTO: 1.02 10*3/MM3 (ref 0.2–1.2)
MONOCYTES NFR BLD AUTO: 1.7 % (ref 2–9)
MONOCYTES NFR BLD AUTO: 6.7 % (ref 5–12)
NEUTROPHILS # BLD AUTO: 11.82 10*3/MM3 (ref 1.9–8.1)
NEUTROPHILS # BLD AUTO: 6.9 10*3/MM3 (ref 1.4–6.5)
NEUTROPHILS NFR BLD AUTO: 72.2 % (ref 42–75)
NEUTROPHILS NFR BLD AUTO: 77.4 % (ref 42.7–76)
PCO2 BLDA: 37.8 MM HG (ref 35–45)
PH BLDA: 7.42 PH UNITS (ref 7.35–7.45)
PLATELET # BLD AUTO: 225 10*3/MM3 (ref 140–500)
PLATELET # BLD AUTO: 235 10*3/MM3 (ref 150–450)
PMV BLD AUTO: 7.3 FL (ref 7.1–10.5)
PMV BLD AUTO: 9.6 FL (ref 6–12)
PO2 BLDA: 57.6 MM HG (ref 80–100)
POTASSIUM BLD-SCNC: 4.2 MMOL/L (ref 3.5–5.2)
PROCALCITONIN SERPL-MCNC: 0.07 NG/ML (ref 0.1–0.25)
PROT SERPL-MCNC: 7 G/DL (ref 6–8.5)
RBC # BLD AUTO: 4.57 10*6/MM3 (ref 4.6–6)
RBC # BLD AUTO: 4.85 10*6/MM3 (ref 4–6)
RHINOVIRUS RNA SPEC NAA+PROBE: NOT DETECTED
RSV RNA NPH QL NAA+NON-PROBE: NOT DETECTED
SAO2 % BLDCOA: 90.1 % (ref 92–99)
SODIUM BLD-SCNC: 140 MMOL/L (ref 136–145)
TOTAL RATE: 20 BREATHS/MINUTE
WBC NRBC COR # BLD: 15.25 10*3/MM3 (ref 4.5–10.7)
WBC NRBC COR # BLD: 9.6 10*3/MM3 (ref 4.5–10)
WHOLE BLOOD HOLD SPECIMEN: NORMAL
WHOLE BLOOD HOLD SPECIMEN: NORMAL

## 2017-10-13 PROCEDURE — 87486 CHLMYD PNEUM DNA AMP PROBE: CPT | Performed by: EMERGENCY MEDICINE

## 2017-10-13 PROCEDURE — 84145 PROCALCITONIN (PCT): CPT | Performed by: EMERGENCY MEDICINE

## 2017-10-13 PROCEDURE — 87040 BLOOD CULTURE FOR BACTERIA: CPT | Performed by: EMERGENCY MEDICINE

## 2017-10-13 PROCEDURE — 87581 M.PNEUMON DNA AMP PROBE: CPT | Performed by: EMERGENCY MEDICINE

## 2017-10-13 PROCEDURE — 71020 HC CHEST PA AND LATERAL: CPT

## 2017-10-13 PROCEDURE — 36415 COLL VENOUS BLD VENIPUNCTURE: CPT | Performed by: NURSE PRACTITIONER

## 2017-10-13 PROCEDURE — 85025 COMPLETE CBC W/AUTO DIFF WBC: CPT | Performed by: EMERGENCY MEDICINE

## 2017-10-13 PROCEDURE — 99213 OFFICE O/P EST LOW 20 MIN: CPT | Performed by: NURSE PRACTITIONER

## 2017-10-13 PROCEDURE — 25010000002 METHYLPREDNISOLONE PER 125 MG: Performed by: EMERGENCY MEDICINE

## 2017-10-13 PROCEDURE — 93000 ELECTROCARDIOGRAM COMPLETE: CPT | Performed by: NURSE PRACTITIONER

## 2017-10-13 PROCEDURE — 99285 EMERGENCY DEPT VISIT HI MDM: CPT

## 2017-10-13 PROCEDURE — 36600 WITHDRAWAL OF ARTERIAL BLOOD: CPT

## 2017-10-13 PROCEDURE — 87633 RESP VIRUS 12-25 TARGETS: CPT | Performed by: EMERGENCY MEDICINE

## 2017-10-13 PROCEDURE — 87798 DETECT AGENT NOS DNA AMP: CPT | Performed by: EMERGENCY MEDICINE

## 2017-10-13 PROCEDURE — 80053 COMPREHEN METABOLIC PANEL: CPT | Performed by: EMERGENCY MEDICINE

## 2017-10-13 PROCEDURE — 94640 AIRWAY INHALATION TREATMENT: CPT

## 2017-10-13 PROCEDURE — 82803 BLOOD GASES ANY COMBINATION: CPT

## 2017-10-13 PROCEDURE — 83605 ASSAY OF LACTIC ACID: CPT | Performed by: EMERGENCY MEDICINE

## 2017-10-13 PROCEDURE — 85025 COMPLETE CBC W/AUTO DIFF WBC: CPT | Performed by: NURSE PRACTITIONER

## 2017-10-13 RX ORDER — ROPINIROLE 1 MG/1
1 TABLET, FILM COATED ORAL NIGHTLY
Status: DISCONTINUED | OUTPATIENT
Start: 2017-10-14 | End: 2017-10-17 | Stop reason: HOSPADM

## 2017-10-13 RX ORDER — SENNA AND DOCUSATE SODIUM 50; 8.6 MG/1; MG/1
2 TABLET, FILM COATED ORAL 2 TIMES DAILY PRN
Status: DISCONTINUED | OUTPATIENT
Start: 2017-10-13 | End: 2017-10-17 | Stop reason: HOSPADM

## 2017-10-13 RX ORDER — METHYLPREDNISOLONE SODIUM SUCCINATE 40 MG/ML
40 INJECTION, POWDER, LYOPHILIZED, FOR SOLUTION INTRAMUSCULAR; INTRAVENOUS EVERY 12 HOURS
Status: DISCONTINUED | OUTPATIENT
Start: 2017-10-14 | End: 2017-10-16

## 2017-10-13 RX ORDER — IPRATROPIUM BROMIDE AND ALBUTEROL SULFATE 2.5; .5 MG/3ML; MG/3ML
3 SOLUTION RESPIRATORY (INHALATION) ONCE
Status: COMPLETED | OUTPATIENT
Start: 2017-10-13 | End: 2017-10-13

## 2017-10-13 RX ORDER — MELATONIN
1000 DAILY
Status: DISCONTINUED | OUTPATIENT
Start: 2017-10-14 | End: 2017-10-17 | Stop reason: HOSPADM

## 2017-10-13 RX ORDER — PANTOPRAZOLE SODIUM 40 MG/1
40 TABLET, DELAYED RELEASE ORAL DAILY
COMMUNITY
End: 2017-10-17 | Stop reason: HOSPADM

## 2017-10-13 RX ORDER — SODIUM CHLORIDE 0.9 % (FLUSH) 0.9 %
1-10 SYRINGE (ML) INJECTION AS NEEDED
Status: DISCONTINUED | OUTPATIENT
Start: 2017-10-13 | End: 2017-10-17 | Stop reason: HOSPADM

## 2017-10-13 RX ORDER — FLUOXETINE HYDROCHLORIDE 20 MG/1
60 CAPSULE ORAL DAILY
Status: DISCONTINUED | OUTPATIENT
Start: 2017-10-14 | End: 2017-10-17 | Stop reason: HOSPADM

## 2017-10-13 RX ORDER — FLUOXETINE HYDROCHLORIDE 60 MG/1
60 TABLET, FILM COATED ORAL; ORAL DAILY
Qty: 30 TABLET | Refills: 3 | Status: ON HOLD | OUTPATIENT
Start: 2017-10-13 | End: 2018-01-16

## 2017-10-13 RX ORDER — ESOMEPRAZOLE MAGNESIUM 40 MG/1
40 CAPSULE, DELAYED RELEASE ORAL EVERY MORNING
COMMUNITY
End: 2018-01-03 | Stop reason: ALTCHOICE

## 2017-10-13 RX ORDER — DEXTROMETHORPHAN POLISTIREX 30 MG/5ML
30 SUSPENSION ORAL 2 TIMES DAILY PRN
Status: DISCONTINUED | OUTPATIENT
Start: 2017-10-13 | End: 2017-10-17 | Stop reason: HOSPADM

## 2017-10-13 RX ORDER — NITROGLYCERIN 0.4 MG/1
0.4 TABLET SUBLINGUAL
Status: DISCONTINUED | OUTPATIENT
Start: 2017-10-13 | End: 2017-10-17 | Stop reason: HOSPADM

## 2017-10-13 RX ORDER — ACETAMINOPHEN 325 MG/1
650 TABLET ORAL EVERY 4 HOURS PRN
Status: DISCONTINUED | OUTPATIENT
Start: 2017-10-13 | End: 2017-10-17 | Stop reason: HOSPADM

## 2017-10-13 RX ORDER — SODIUM CHLORIDE 0.9 % (FLUSH) 0.9 %
10 SYRINGE (ML) INJECTION AS NEEDED
Status: DISCONTINUED | OUTPATIENT
Start: 2017-10-13 | End: 2017-10-17 | Stop reason: HOSPADM

## 2017-10-13 RX ORDER — METHYLPHENIDATE HYDROCHLORIDE 10 MG/1
TABLET ORAL
Qty: 30 TABLET | Refills: 0 | Status: SHIPPED | OUTPATIENT
Start: 2017-10-13 | End: 2017-10-27 | Stop reason: SDUPTHER

## 2017-10-13 RX ORDER — IPRATROPIUM BROMIDE AND ALBUTEROL SULFATE 2.5; .5 MG/3ML; MG/3ML
3 SOLUTION RESPIRATORY (INHALATION)
Status: DISCONTINUED | OUTPATIENT
Start: 2017-10-14 | End: 2017-10-17 | Stop reason: HOSPADM

## 2017-10-13 RX ORDER — METHYLPREDNISOLONE SODIUM SUCCINATE 125 MG/2ML
125 INJECTION, POWDER, LYOPHILIZED, FOR SOLUTION INTRAMUSCULAR; INTRAVENOUS ONCE
Status: COMPLETED | OUTPATIENT
Start: 2017-10-13 | End: 2017-10-13

## 2017-10-13 RX ORDER — TRAMADOL HYDROCHLORIDE 50 MG/1
50 TABLET ORAL EVERY 6 HOURS PRN
Status: DISCONTINUED | OUTPATIENT
Start: 2017-10-13 | End: 2017-10-17 | Stop reason: HOSPADM

## 2017-10-13 RX ADMIN — IPRATROPIUM BROMIDE AND ALBUTEROL SULFATE 3 ML: .5; 3 SOLUTION RESPIRATORY (INHALATION) at 18:51

## 2017-10-13 RX ADMIN — METHYLPREDNISOLONE SODIUM SUCCINATE 125 MG: 125 INJECTION, POWDER, FOR SOLUTION INTRAMUSCULAR; INTRAVENOUS at 21:16

## 2017-10-13 NOTE — PROGRESS NOTES
Procedure     ECG 12 Lead  Date/Time: 10/13/2017 12:28 PM  Performed by: KWESI SIMS  Authorized by: KWESI SIMS   Comparison: compared with previous ECG from 6/28/2017  Comparison to previous ECG: NSR  Rhythm: sinus rhythm  Rate: normal  ST Depression: II and V4  QRS axis: normal  Clinical impression: abnormal ECG  Comments: Discussed with Dr. Aviles

## 2017-10-13 NOTE — PROGRESS NOTES
Subjective   Tony Leonard is a 77 y.o. male.     History of Present Illness   Here today for cough, walked in office with no appt today, he states this started a couple of days ago, he has noticed wheezing in throat, he has drainage, he denies fever, he does get SOA at times, he states he has used inhalers as prescribed, he did nebulizer at home this morning. He sees ID for hx of MSSA septicemia. He was admitted to hospital in 6/17 for COPD exacerbation, had bronch, and septicemia. He sees Dr. Salter, pulm, sees him next week. He does wear home 02 but in his car currently. He denies CP, he does feel SOA at times with walking, sitting ok, he states he has noticed LE swelling since he was in hospital over summer. He states he was taken off lasix about 1 month ago.     The following portions of the patient's history were reviewed and updated as appropriate: allergies, current medications, past family history, past medical history, past social history, past surgical history and problem list.    Review of Systems   Constitutional: Negative for chills, diaphoresis and fever.   HENT: Positive for congestion. Negative for ear pain, postnasal drip, rhinorrhea, sinus pressure and sore throat.    Respiratory: Positive for cough, shortness of breath and wheezing. Negative for chest tightness.    Cardiovascular: Negative for chest pain, palpitations and leg swelling.   Musculoskeletal: Negative for arthralgias and myalgias.   Skin: Negative for pallor.   Neurological: Negative for dizziness, light-headedness and headaches.   All other systems reviewed and are negative.      Objective   Physical Exam   Constitutional: He is oriented to person, place, and time. He appears well-developed and well-nourished.   HENT:   Head: Normocephalic.   Right Ear: Tympanic membrane and ear canal normal.   Left Ear: Tympanic membrane and ear canal normal.   Nose: Nose normal.   Mouth/Throat: Uvula is midline, oropharynx is clear and moist and  mucous membranes are normal.   Eyes: Pupils are equal, round, and reactive to light.   Neck: Neck supple.   Cardiovascular: Normal rate, regular rhythm and normal heart sounds.    Pulmonary/Chest: Effort normal. No respiratory distress. He has decreased breath sounds in the right lower field and the left lower field. He has no wheezes. He has rhonchi in the right upper field, the right lower field, the left upper field and the left lower field. He has no rales. He exhibits no tenderness.   Musculoskeletal: Normal range of motion.   Neurological: He is alert and oriented to person, place, and time.   Skin: Skin is warm and dry.   Psychiatric: He has a normal mood and affect. His behavior is normal. Judgment and thought content normal.   Nursing note and vitals reviewed.      Assessment/Plan   Tony was seen today for cough and shortness of breath.    Diagnoses and all orders for this visit:    Chronic obstructive pulmonary disease, unspecified COPD type  -     CBC & Differential  -     CBC Auto Differential    Cough  -     CBC & Differential  -     CBC Auto Differential    Cbc today, WNL.  EKG today.  Patient agreed to hospital via ambulance, ambulance called at this time.   Encouraged cont f/u with ID and pulm.   If any worsening symptoms, SOA advised to call 911 or go to the ER.   Increase fluid intake, get plenty of rest.   Discussed plan of care with Dr. Aviles.   Patient left office with EMS.   Patient agrees with plan of care and understands instructions. Call if worsening symptoms or any problems or concerns.   1258 EMS in office, patient refused EMS transport while in office, EMS walked patient to car before seeing patient again and leaving office, patient advised otherwise and to go to the hospital or call 911 if any worsening symptoms. Mamta HAYS and EMS witnessed patient refused ambulance and left office against advice otherwise.

## 2017-10-13 NOTE — PATIENT INSTRUCTIONS
Cbc today, WNL.  EKG today.  Patient agreed to hospital via ambulance, ambulance called at this time.   Encouraged cont f/u with ID and pulm.   If any worsening symptoms, SOA advised to call 911 or go to the ER.   Increase fluid intake, get plenty of rest.   Discussed plan of care with Dr. Aviles.   Patient agrees with plan of care and understands instructions. Call if worsening symptoms or any problems or concerns.

## 2017-10-14 LAB
ANION GAP SERPL CALCULATED.3IONS-SCNC: 14.3 MMOL/L
BASOPHILS # BLD AUTO: 0 10*3/MM3 (ref 0–0.2)
BASOPHILS NFR BLD AUTO: 0 % (ref 0–1.5)
BUN BLD-MCNC: 15 MG/DL (ref 8–23)
BUN/CREAT SERPL: 17 (ref 7–25)
CALCIUM SPEC-SCNC: 9.1 MG/DL (ref 8.6–10.5)
CHLORIDE SERPL-SCNC: 100 MMOL/L (ref 98–107)
CO2 SERPL-SCNC: 20.7 MMOL/L (ref 22–29)
CREAT BLD-MCNC: 0.88 MG/DL (ref 0.76–1.27)
DEPRECATED RDW RBC AUTO: 47.3 FL (ref 37–54)
EOSINOPHIL # BLD AUTO: 0 10*3/MM3 (ref 0–0.7)
EOSINOPHIL NFR BLD AUTO: 0 % (ref 0.3–6.2)
ERYTHROCYTE [DISTWIDTH] IN BLOOD BY AUTOMATED COUNT: 13.6 % (ref 11.5–14.5)
GFR SERPL CREATININE-BSD FRML MDRD: 84 ML/MIN/1.73
GLUCOSE BLD-MCNC: 152 MG/DL (ref 65–99)
HCT VFR BLD AUTO: 43.2 % (ref 40.4–52.2)
HGB BLD-MCNC: 14.2 G/DL (ref 13.7–17.6)
IMM GRANULOCYTES # BLD: 0 10*3/MM3 (ref 0–0.03)
IMM GRANULOCYTES NFR BLD: 0 % (ref 0–0.5)
LYMPHOCYTES # BLD AUTO: 0.76 10*3/MM3 (ref 0.9–4.8)
LYMPHOCYTES NFR BLD AUTO: 8.8 % (ref 19.6–45.3)
MAGNESIUM SERPL-MCNC: 2.2 MG/DL (ref 1.6–2.4)
MCH RBC QN AUTO: 31.3 PG (ref 27–32.7)
MCHC RBC AUTO-ENTMCNC: 32.9 G/DL (ref 32.6–36.4)
MCV RBC AUTO: 95.2 FL (ref 79.8–96.2)
MONOCYTES # BLD AUTO: 0.01 10*3/MM3 (ref 0.2–1.2)
MONOCYTES NFR BLD AUTO: 0.1 % (ref 5–12)
NEUTROPHILS # BLD AUTO: 7.9 10*3/MM3 (ref 1.9–8.1)
NEUTROPHILS NFR BLD AUTO: 91.1 % (ref 42.7–76)
NT-PROBNP SERPL-MCNC: 174.7 PG/ML (ref 0–1800)
PHOSPHATE SERPL-MCNC: 3.4 MG/DL (ref 2.5–4.5)
PLATELET # BLD AUTO: 205 10*3/MM3 (ref 140–500)
PMV BLD AUTO: 9.9 FL (ref 6–12)
POTASSIUM BLD-SCNC: 4.4 MMOL/L (ref 3.5–5.2)
RBC # BLD AUTO: 4.54 10*6/MM3 (ref 4.6–6)
SODIUM BLD-SCNC: 135 MMOL/L (ref 136–145)
WBC NRBC COR # BLD: 8.67 10*3/MM3 (ref 4.5–10.7)

## 2017-10-14 PROCEDURE — 94799 UNLISTED PULMONARY SVC/PX: CPT

## 2017-10-14 PROCEDURE — 80048 BASIC METABOLIC PNL TOTAL CA: CPT | Performed by: INTERNAL MEDICINE

## 2017-10-14 PROCEDURE — 84100 ASSAY OF PHOSPHORUS: CPT | Performed by: INTERNAL MEDICINE

## 2017-10-14 PROCEDURE — 25010000002 ENOXAPARIN PER 10 MG: Performed by: INTERNAL MEDICINE

## 2017-10-14 PROCEDURE — 25010000002 METHYLPREDNISOLONE PER 40 MG: Performed by: INTERNAL MEDICINE

## 2017-10-14 PROCEDURE — 85025 COMPLETE CBC W/AUTO DIFF WBC: CPT | Performed by: INTERNAL MEDICINE

## 2017-10-14 PROCEDURE — 83735 ASSAY OF MAGNESIUM: CPT | Performed by: INTERNAL MEDICINE

## 2017-10-14 PROCEDURE — 83880 ASSAY OF NATRIURETIC PEPTIDE: CPT | Performed by: INTERNAL MEDICINE

## 2017-10-14 RX ADMIN — FLUOXETINE HYDROCHLORIDE 60 MG: 20 CAPSULE ORAL at 09:09

## 2017-10-14 RX ADMIN — ROPINIROLE 1 MG: 1 TABLET, FILM COATED ORAL at 21:50

## 2017-10-14 RX ADMIN — METHYLPREDNISOLONE SODIUM SUCCINATE 40 MG: 40 INJECTION, POWDER, FOR SOLUTION INTRAMUSCULAR; INTRAVENOUS at 11:40

## 2017-10-14 RX ADMIN — ENOXAPARIN SODIUM 30 MG: 30 INJECTION SUBCUTANEOUS at 09:09

## 2017-10-14 RX ADMIN — VITAMIN D, TAB 1000IU (100/BT) 1000 UNITS: 25 TAB at 09:09

## 2017-10-14 RX ADMIN — METHYLPREDNISOLONE SODIUM SUCCINATE 40 MG: 40 INJECTION, POWDER, FOR SOLUTION INTRAMUSCULAR; INTRAVENOUS at 01:39

## 2017-10-14 RX ADMIN — IPRATROPIUM BROMIDE AND ALBUTEROL SULFATE 3 ML: .5; 3 SOLUTION RESPIRATORY (INHALATION) at 07:21

## 2017-10-14 RX ADMIN — IPRATROPIUM BROMIDE AND ALBUTEROL SULFATE 3 ML: .5; 3 SOLUTION RESPIRATORY (INHALATION) at 20:38

## 2017-10-14 RX ADMIN — TRAMADOL HYDROCHLORIDE 50 MG: 50 TABLET, FILM COATED ORAL at 15:55

## 2017-10-14 RX ADMIN — IPRATROPIUM BROMIDE AND ALBUTEROL SULFATE 3 ML: .5; 3 SOLUTION RESPIRATORY (INHALATION) at 15:06

## 2017-10-14 RX ADMIN — IPRATROPIUM BROMIDE AND ALBUTEROL SULFATE 3 ML: .5; 3 SOLUTION RESPIRATORY (INHALATION) at 10:27

## 2017-10-15 PROCEDURE — 97116 GAIT TRAINING THERAPY: CPT | Performed by: PHYSICAL THERAPIST

## 2017-10-15 PROCEDURE — 25010000002 METHYLPREDNISOLONE PER 40 MG: Performed by: INTERNAL MEDICINE

## 2017-10-15 PROCEDURE — 97161 PT EVAL LOW COMPLEX 20 MIN: CPT | Performed by: PHYSICAL THERAPIST

## 2017-10-15 PROCEDURE — 94799 UNLISTED PULMONARY SVC/PX: CPT

## 2017-10-15 PROCEDURE — 87081 CULTURE SCREEN ONLY: CPT | Performed by: INTERNAL MEDICINE

## 2017-10-15 PROCEDURE — 87070 CULTURE OTHR SPECIMN AEROBIC: CPT | Performed by: INTERNAL MEDICINE

## 2017-10-15 PROCEDURE — 25010000002 ENOXAPARIN PER 10 MG: Performed by: INTERNAL MEDICINE

## 2017-10-15 PROCEDURE — 87205 SMEAR GRAM STAIN: CPT | Performed by: INTERNAL MEDICINE

## 2017-10-15 RX ORDER — BUDESONIDE 0.5 MG/2ML
0.5 INHALANT ORAL
Status: DISCONTINUED | OUTPATIENT
Start: 2017-10-15 | End: 2017-10-17 | Stop reason: HOSPADM

## 2017-10-15 RX ORDER — ARFORMOTEROL TARTRATE 15 UG/2ML
15 SOLUTION RESPIRATORY (INHALATION)
Status: DISCONTINUED | OUTPATIENT
Start: 2017-10-15 | End: 2017-10-17 | Stop reason: HOSPADM

## 2017-10-15 RX ADMIN — METHYLPREDNISOLONE SODIUM SUCCINATE 40 MG: 40 INJECTION, POWDER, FOR SOLUTION INTRAMUSCULAR; INTRAVENOUS at 01:07

## 2017-10-15 RX ADMIN — METHYLPREDNISOLONE SODIUM SUCCINATE 40 MG: 40 INJECTION, POWDER, FOR SOLUTION INTRAMUSCULAR; INTRAVENOUS at 23:32

## 2017-10-15 RX ADMIN — VITAMIN D, TAB 1000IU (100/BT) 1000 UNITS: 25 TAB at 09:17

## 2017-10-15 RX ADMIN — METHYLPREDNISOLONE SODIUM SUCCINATE 40 MG: 40 INJECTION, POWDER, FOR SOLUTION INTRAMUSCULAR; INTRAVENOUS at 12:01

## 2017-10-15 RX ADMIN — IPRATROPIUM BROMIDE AND ALBUTEROL SULFATE 3 ML: .5; 3 SOLUTION RESPIRATORY (INHALATION) at 20:31

## 2017-10-15 RX ADMIN — ENOXAPARIN SODIUM 30 MG: 30 INJECTION SUBCUTANEOUS at 09:16

## 2017-10-15 RX ADMIN — IPRATROPIUM BROMIDE AND ALBUTEROL SULFATE 3 ML: .5; 3 SOLUTION RESPIRATORY (INHALATION) at 10:42

## 2017-10-15 RX ADMIN — FLUOXETINE HYDROCHLORIDE 60 MG: 20 CAPSULE ORAL at 09:17

## 2017-10-15 RX ADMIN — BUDESONIDE 0.5 MG: 0.5 INHALANT RESPIRATORY (INHALATION) at 20:31

## 2017-10-15 RX ADMIN — ROPINIROLE 1 MG: 1 TABLET, FILM COATED ORAL at 20:09

## 2017-10-15 RX ADMIN — IPRATROPIUM BROMIDE AND ALBUTEROL SULFATE 3 ML: .5; 3 SOLUTION RESPIRATORY (INHALATION) at 08:10

## 2017-10-16 PROCEDURE — 94799 UNLISTED PULMONARY SVC/PX: CPT

## 2017-10-16 PROCEDURE — 25010000002 METHYLPREDNISOLONE PER 40 MG: Performed by: INTERNAL MEDICINE

## 2017-10-16 PROCEDURE — 97110 THERAPEUTIC EXERCISES: CPT

## 2017-10-16 PROCEDURE — 25010000002 ENOXAPARIN PER 10 MG: Performed by: INTERNAL MEDICINE

## 2017-10-16 RX ORDER — PREDNISONE 20 MG/1
40 TABLET ORAL
Status: DISCONTINUED | OUTPATIENT
Start: 2017-10-17 | End: 2017-10-17 | Stop reason: HOSPADM

## 2017-10-16 RX ADMIN — ENOXAPARIN SODIUM 30 MG: 30 INJECTION SUBCUTANEOUS at 09:24

## 2017-10-16 RX ADMIN — BUDESONIDE 0.5 MG: 0.5 INHALANT RESPIRATORY (INHALATION) at 19:37

## 2017-10-16 RX ADMIN — ROPINIROLE 1 MG: 1 TABLET, FILM COATED ORAL at 22:00

## 2017-10-16 RX ADMIN — METHYLPREDNISOLONE SODIUM SUCCINATE 40 MG: 40 INJECTION, POWDER, FOR SOLUTION INTRAMUSCULAR; INTRAVENOUS at 11:46

## 2017-10-16 RX ADMIN — TRAMADOL HYDROCHLORIDE 50 MG: 50 TABLET, FILM COATED ORAL at 15:18

## 2017-10-16 RX ADMIN — BUDESONIDE 0.5 MG: 0.5 INHALANT RESPIRATORY (INHALATION) at 07:04

## 2017-10-16 RX ADMIN — IPRATROPIUM BROMIDE AND ALBUTEROL SULFATE 3 ML: .5; 3 SOLUTION RESPIRATORY (INHALATION) at 07:03

## 2017-10-16 RX ADMIN — TRAMADOL HYDROCHLORIDE 50 MG: 50 TABLET, FILM COATED ORAL at 04:03

## 2017-10-16 RX ADMIN — VITAMIN D, TAB 1000IU (100/BT) 1000 UNITS: 25 TAB at 09:24

## 2017-10-16 RX ADMIN — ARFORMOTEROL TARTRATE 15 MCG: 15 SOLUTION RESPIRATORY (INHALATION) at 19:37

## 2017-10-16 RX ADMIN — ARFORMOTEROL TARTRATE 15 MCG: 15 SOLUTION RESPIRATORY (INHALATION) at 09:59

## 2017-10-16 RX ADMIN — IPRATROPIUM BROMIDE AND ALBUTEROL SULFATE 3 ML: .5; 3 SOLUTION RESPIRATORY (INHALATION) at 14:53

## 2017-10-16 RX ADMIN — IPRATROPIUM BROMIDE AND ALBUTEROL SULFATE 3 ML: .5; 3 SOLUTION RESPIRATORY (INHALATION) at 11:08

## 2017-10-16 RX ADMIN — FLUOXETINE HYDROCHLORIDE 60 MG: 20 CAPSULE ORAL at 09:24

## 2017-10-17 ENCOUNTER — APPOINTMENT (OUTPATIENT)
Dept: GENERAL RADIOLOGY | Facility: HOSPITAL | Age: 77
End: 2017-10-17

## 2017-10-17 ENCOUNTER — HOSPITAL ENCOUNTER (INPATIENT)
Facility: HOSPITAL | Age: 77
LOS: 2 days | Discharge: HOME | End: 2017-10-19
Attending: EMERGENCY MEDICINE | Admitting: INTERNAL MEDICINE

## 2017-10-17 VITALS
RESPIRATION RATE: 20 BRPM | WEIGHT: 163.19 LBS | OXYGEN SATURATION: 93 % | BODY MASS INDEX: 22.85 KG/M2 | TEMPERATURE: 98 F | HEART RATE: 106 BPM | HEIGHT: 71 IN | SYSTOLIC BLOOD PRESSURE: 137 MMHG | DIASTOLIC BLOOD PRESSURE: 73 MMHG

## 2017-10-17 DIAGNOSIS — J44.1 COPD EXACERBATION (HCC): Primary | ICD-10-CM

## 2017-10-17 DIAGNOSIS — J96.21 ACUTE ON CHRONIC RESPIRATORY FAILURE WITH HYPOXIA (HCC): ICD-10-CM

## 2017-10-17 LAB
ALBUMIN SERPL-MCNC: 3.6 G/DL (ref 3.5–5.2)
ALBUMIN/GLOB SERPL: 1.4 G/DL
ALP SERPL-CCNC: 63 U/L (ref 39–117)
ALT SERPL W P-5'-P-CCNC: 56 U/L (ref 1–41)
ANION GAP SERPL CALCULATED.3IONS-SCNC: 12.2 MMOL/L
ANION GAP SERPL CALCULATED.3IONS-SCNC: 12.3 MMOL/L
ARTERIAL PATENCY WRIST A: POSITIVE
AST SERPL-CCNC: 63 U/L (ref 1–40)
ATMOSPHERIC PRESS: 755.1 MMHG
BACTERIA SPEC RESP CULT: NORMAL
BACTERIA SPEC RESP CULT: NORMAL
BASE EXCESS BLDA CALC-SCNC: 0.7 MMOL/L (ref 0–2)
BASOPHILS # BLD AUTO: 0.01 10*3/MM3 (ref 0–0.2)
BASOPHILS NFR BLD AUTO: 0.1 % (ref 0–1.5)
BDY SITE: ABNORMAL
BILIRUB SERPL-MCNC: 0.4 MG/DL (ref 0.1–1.2)
BUN BLD-MCNC: 22 MG/DL (ref 8–23)
BUN BLD-MCNC: 27 MG/DL (ref 8–23)
BUN/CREAT SERPL: 24.1 (ref 7–25)
BUN/CREAT SERPL: 24.4 (ref 7–25)
CALCIUM SPEC-SCNC: 9 MG/DL (ref 8.6–10.5)
CALCIUM SPEC-SCNC: 9.4 MG/DL (ref 8.6–10.5)
CHLORIDE SERPL-SCNC: 100 MMOL/L (ref 98–107)
CHLORIDE SERPL-SCNC: 101 MMOL/L (ref 98–107)
CO2 SERPL-SCNC: 23.8 MMOL/L (ref 22–29)
CO2 SERPL-SCNC: 26.7 MMOL/L (ref 22–29)
CREAT BLD-MCNC: 0.9 MG/DL (ref 0.76–1.27)
CREAT BLD-MCNC: 1.12 MG/DL (ref 0.76–1.27)
DEPRECATED RDW RBC AUTO: 49.7 FL (ref 37–54)
EOSINOPHIL # BLD AUTO: 0 10*3/MM3 (ref 0–0.7)
EOSINOPHIL NFR BLD AUTO: 0 % (ref 0.3–6.2)
ERYTHROCYTE [DISTWIDTH] IN BLOOD BY AUTOMATED COUNT: 14.4 % (ref 11.5–14.5)
GAS FLOW AIRWAY: 3 LPM
GFR SERPL CREATININE-BSD FRML MDRD: 63 ML/MIN/1.73
GFR SERPL CREATININE-BSD FRML MDRD: 81 ML/MIN/1.73
GLOBULIN UR ELPH-MCNC: 2.5 GM/DL
GLUCOSE BLD-MCNC: 125 MG/DL (ref 65–99)
GLUCOSE BLD-MCNC: 133 MG/DL (ref 65–99)
GRAM STN SPEC: NORMAL
GRAM STN SPEC: NORMAL
HCO3 BLDA-SCNC: 25.4 MMOL/L (ref 22–28)
HCT VFR BLD AUTO: 38.2 % (ref 40.4–52.2)
HGB BLD-MCNC: 12.7 G/DL (ref 13.7–17.6)
IMM GRANULOCYTES # BLD: 0.11 10*3/MM3 (ref 0–0.03)
IMM GRANULOCYTES NFR BLD: 0.7 % (ref 0–0.5)
LYMPHOCYTES # BLD AUTO: 0.46 10*3/MM3 (ref 0.9–4.8)
LYMPHOCYTES NFR BLD AUTO: 3 % (ref 19.6–45.3)
MAGNESIUM SERPL-MCNC: 2.5 MG/DL (ref 1.6–2.4)
MCH RBC QN AUTO: 31.4 PG (ref 27–32.7)
MCHC RBC AUTO-ENTMCNC: 33.2 G/DL (ref 32.6–36.4)
MCV RBC AUTO: 94.6 FL (ref 79.8–96.2)
MODALITY: ABNORMAL
MONOCYTES # BLD AUTO: 1.65 10*3/MM3 (ref 0.2–1.2)
MONOCYTES NFR BLD AUTO: 10.7 % (ref 5–12)
MRSA SPEC QL CULT: NORMAL
NEUTROPHILS # BLD AUTO: 13.25 10*3/MM3 (ref 1.9–8.1)
NEUTROPHILS NFR BLD AUTO: 85.5 % (ref 42.7–76)
NT-PROBNP SERPL-MCNC: 1157 PG/ML (ref 0–1800)
PCO2 BLDA: 39.9 MM HG (ref 35–45)
PH BLDA: 7.41 PH UNITS (ref 7.35–7.45)
PLATELET # BLD AUTO: 206 10*3/MM3 (ref 140–500)
PMV BLD AUTO: 9.8 FL (ref 6–12)
PO2 BLDA: 45.8 MM HG (ref 80–100)
POTASSIUM BLD-SCNC: 4.2 MMOL/L (ref 3.5–5.2)
POTASSIUM BLD-SCNC: 4.7 MMOL/L (ref 3.5–5.2)
PROT SERPL-MCNC: 6.1 G/DL (ref 6–8.5)
RBC # BLD AUTO: 4.04 10*6/MM3 (ref 4.6–6)
SAO2 % BLDCOA: 82 % (ref 92–99)
SET MECH RESP RATE: 18
SODIUM BLD-SCNC: 136 MMOL/L (ref 136–145)
SODIUM BLD-SCNC: 140 MMOL/L (ref 136–145)
TROPONIN T SERPL-MCNC: <0.01 NG/ML (ref 0–0.03)
WBC NRBC COR # BLD: 15.48 10*3/MM3 (ref 4.5–10.7)

## 2017-10-17 PROCEDURE — 63710000001 PREDNISONE PER 1 MG: Performed by: INTERNAL MEDICINE

## 2017-10-17 PROCEDURE — 93010 ELECTROCARDIOGRAM REPORT: CPT | Performed by: INTERNAL MEDICINE

## 2017-10-17 PROCEDURE — 97110 THERAPEUTIC EXERCISES: CPT

## 2017-10-17 PROCEDURE — 25010000002 ENOXAPARIN PER 10 MG: Performed by: INTERNAL MEDICINE

## 2017-10-17 PROCEDURE — 94799 UNLISTED PULMONARY SVC/PX: CPT

## 2017-10-17 PROCEDURE — 83735 ASSAY OF MAGNESIUM: CPT | Performed by: INTERNAL MEDICINE

## 2017-10-17 PROCEDURE — 80053 COMPREHEN METABOLIC PANEL: CPT | Performed by: INTERNAL MEDICINE

## 2017-10-17 RX ORDER — DOXYCYCLINE HYCLATE 100 MG/1
100 CAPSULE ORAL 2 TIMES DAILY
Qty: 10 CAPSULE | Refills: 0 | Status: SHIPPED | OUTPATIENT
Start: 2017-10-17 | End: 2017-10-17

## 2017-10-17 RX ORDER — ALBUTEROL SULFATE 2.5 MG/3ML
2.5 SOLUTION RESPIRATORY (INHALATION)
Status: COMPLETED | OUTPATIENT
Start: 2017-10-17 | End: 2017-10-17

## 2017-10-17 RX ORDER — ALBUTEROL SULFATE 2.5 MG/3ML
2.5 SOLUTION RESPIRATORY (INHALATION) ONCE
Status: COMPLETED | OUTPATIENT
Start: 2017-10-17 | End: 2017-10-17

## 2017-10-17 RX ORDER — MAGNESIUM OXIDE 400 MG/1
400 TABLET ORAL 2 TIMES DAILY
Status: DISCONTINUED | OUTPATIENT
Start: 2017-10-17 | End: 2017-10-17 | Stop reason: HOSPADM

## 2017-10-17 RX ORDER — PREDNISONE 10 MG/1
TABLET ORAL
Qty: 31 TABLET | Refills: 0 | Status: ON HOLD | OUTPATIENT
Start: 2017-10-17 | End: 2017-10-18

## 2017-10-17 RX ORDER — METHYLPREDNISOLONE SODIUM SUCCINATE 125 MG/2ML
125 INJECTION, POWDER, LYOPHILIZED, FOR SOLUTION INTRAMUSCULAR; INTRAVENOUS ONCE
Status: COMPLETED | OUTPATIENT
Start: 2017-10-17 | End: 2017-10-17

## 2017-10-17 RX ORDER — MAGNESIUM OXIDE 400 MG/1
400 TABLET ORAL 2 TIMES DAILY
Qty: 10 TABLET | Refills: 0 | Status: SHIPPED | OUTPATIENT
Start: 2017-10-17 | End: 2017-10-22

## 2017-10-17 RX ORDER — BUDESONIDE 0.5 MG/2ML
0.5 INHALANT ORAL
Qty: 120 ML | Refills: 0 | Status: ON HOLD | OUTPATIENT
Start: 2017-10-17 | End: 2018-01-16

## 2017-10-17 RX ORDER — DOXYCYCLINE HYCLATE 100 MG/1
100 CAPSULE ORAL 2 TIMES DAILY
Qty: 10 CAPSULE | Refills: 0 | Status: ON HOLD | OUTPATIENT
Start: 2017-10-17 | End: 2017-10-18

## 2017-10-17 RX ADMIN — ARFORMOTEROL TARTRATE 15 MCG: 15 SOLUTION RESPIRATORY (INHALATION) at 09:38

## 2017-10-17 RX ADMIN — IPRATROPIUM BROMIDE AND ALBUTEROL SULFATE 3 ML: .5; 3 SOLUTION RESPIRATORY (INHALATION) at 07:04

## 2017-10-17 RX ADMIN — DEXTROMETHORPHAN POLISTIREX 30 MG: 30 SUSPENSION ORAL at 12:30

## 2017-10-17 RX ADMIN — TRAMADOL HYDROCHLORIDE 50 MG: 50 TABLET, FILM COATED ORAL at 08:12

## 2017-10-17 RX ADMIN — IPRATROPIUM BROMIDE AND ALBUTEROL SULFATE 3 ML: .5; 3 SOLUTION RESPIRATORY (INHALATION) at 15:09

## 2017-10-17 RX ADMIN — BUDESONIDE 0.5 MG: 0.5 INHALANT RESPIRATORY (INHALATION) at 07:04

## 2017-10-17 RX ADMIN — METHYLPREDNISOLONE SODIUM SUCCINATE 125 MG: 125 INJECTION, POWDER, FOR SOLUTION INTRAMUSCULAR; INTRAVENOUS at 20:55

## 2017-10-17 RX ADMIN — ALBUTEROL SULFATE 2.5 MG: 2.5 SOLUTION RESPIRATORY (INHALATION) at 20:45

## 2017-10-17 RX ADMIN — ALBUTEROL SULFATE 2.5 MG: 2.5 SOLUTION RESPIRATORY (INHALATION) at 22:09

## 2017-10-17 RX ADMIN — VITAMIN D, TAB 1000IU (100/BT) 1000 UNITS: 25 TAB at 08:12

## 2017-10-17 RX ADMIN — Medication 400 MG: at 17:22

## 2017-10-17 RX ADMIN — ALBUTEROL SULFATE 2.5 MG: 2.5 SOLUTION RESPIRATORY (INHALATION) at 20:59

## 2017-10-17 RX ADMIN — PREDNISONE 40 MG: 20 TABLET ORAL at 08:12

## 2017-10-17 RX ADMIN — FLUOXETINE HYDROCHLORIDE 60 MG: 20 CAPSULE ORAL at 08:12

## 2017-10-17 RX ADMIN — IPRATROPIUM BROMIDE AND ALBUTEROL SULFATE 3 ML: .5; 3 SOLUTION RESPIRATORY (INHALATION) at 11:45

## 2017-10-17 RX ADMIN — ENOXAPARIN SODIUM 30 MG: 30 INJECTION SUBCUTANEOUS at 08:13

## 2017-10-18 ENCOUNTER — EPISODE CHANGES (OUTPATIENT)
Dept: CASE MANAGEMENT | Facility: OTHER | Age: 77
End: 2017-10-18

## 2017-10-18 LAB
BACTERIA SPEC AEROBE CULT: NORMAL
BACTERIA SPEC AEROBE CULT: NORMAL

## 2017-10-18 RX ORDER — MELATONIN
1000 DAILY
Status: DISCONTINUED | OUTPATIENT
Start: 2017-10-18 | End: 2017-10-19 | Stop reason: HOSPADM

## 2017-10-18 RX ORDER — SODIUM CHLORIDE FOR INHALATION 7 %
4 VIAL, NEBULIZER (ML) INHALATION
Status: DISCONTINUED | OUTPATIENT
Start: 2017-10-18 | End: 2017-10-19 | Stop reason: HOSPADM

## 2017-10-18 RX ORDER — BUDESONIDE 0.5 MG/2ML
0.5 INHALANT ORAL
Status: DISCONTINUED | OUTPATIENT
Start: 2017-10-18 | End: 2017-10-19 | Stop reason: HOSPADM

## 2017-10-18 RX ORDER — MAGNESIUM OXIDE 400 MG/1
400 TABLET ORAL 2 TIMES DAILY
Status: DISCONTINUED | OUTPATIENT
Start: 2017-10-18 | End: 2017-10-19 | Stop reason: HOSPADM

## 2017-10-18 RX ORDER — SODIUM CHLORIDE 0.9 % (FLUSH) 0.9 %
1-10 SYRINGE (ML) INJECTION AS NEEDED
Status: DISCONTINUED | OUTPATIENT
Start: 2017-10-18 | End: 2017-10-19 | Stop reason: HOSPADM

## 2017-10-18 RX ORDER — ROPINIROLE 1 MG/1
1 TABLET, FILM COATED ORAL NIGHTLY
Status: DISCONTINUED | OUTPATIENT
Start: 2017-10-18 | End: 2017-10-19 | Stop reason: HOSPADM

## 2017-10-18 RX ORDER — TRAMADOL HYDROCHLORIDE 50 MG/1
50 TABLET ORAL EVERY 6 HOURS PRN
Status: DISCONTINUED | OUTPATIENT
Start: 2017-10-18 | End: 2017-10-19 | Stop reason: HOSPADM

## 2017-10-18 RX ORDER — AZITHROMYCIN 250 MG/1
250 TABLET, FILM COATED ORAL
Status: DISCONTINUED | OUTPATIENT
Start: 2017-10-18 | End: 2017-10-19 | Stop reason: HOSPADM

## 2017-10-18 RX ORDER — SENNA AND DOCUSATE SODIUM 50; 8.6 MG/1; MG/1
2 TABLET, FILM COATED ORAL 2 TIMES DAILY PRN
Status: DISCONTINUED | OUTPATIENT
Start: 2017-10-18 | End: 2017-10-19 | Stop reason: HOSPADM

## 2017-10-18 RX ORDER — NITROGLYCERIN 0.4 MG/1
0.4 TABLET SUBLINGUAL
Status: DISCONTINUED | OUTPATIENT
Start: 2017-10-18 | End: 2017-10-19 | Stop reason: HOSPADM

## 2017-10-18 RX ORDER — MORPHINE SULFATE 2 MG/ML
1 INJECTION, SOLUTION INTRAMUSCULAR; INTRAVENOUS EVERY 4 HOURS PRN
Status: DISCONTINUED | OUTPATIENT
Start: 2017-10-18 | End: 2017-10-19 | Stop reason: HOSPADM

## 2017-10-18 RX ORDER — THIAMINE HCL 100 MG
2500 TABLET ORAL EVERY EVENING
COMMUNITY
End: 2022-12-01 | Stop reason: HOSPADM

## 2017-10-18 RX ORDER — METHYLPREDNISOLONE SODIUM SUCCINATE 40 MG/ML
40 INJECTION, POWDER, LYOPHILIZED, FOR SOLUTION INTRAMUSCULAR; INTRAVENOUS EVERY 12 HOURS
Status: DISCONTINUED | OUTPATIENT
Start: 2017-10-18 | End: 2017-10-19 | Stop reason: HOSPADM

## 2017-10-18 RX ORDER — FLUOXETINE HYDROCHLORIDE 20 MG/1
40 CAPSULE ORAL DAILY
Status: DISCONTINUED | OUTPATIENT
Start: 2017-10-18 | End: 2017-10-19 | Stop reason: HOSPADM

## 2017-10-18 RX ORDER — ROPINIROLE 0.5 MG/1
0.5 TABLET, FILM COATED ORAL NIGHTLY
COMMUNITY
End: 2018-04-13 | Stop reason: SDUPTHER

## 2017-10-18 RX ORDER — ACETAMINOPHEN 325 MG/1
650 TABLET ORAL EVERY 4 HOURS PRN
Status: DISCONTINUED | OUTPATIENT
Start: 2017-10-18 | End: 2017-10-19 | Stop reason: HOSPADM

## 2017-10-18 RX ORDER — ASCORBIC ACID 500 MG
250 TABLET ORAL DAILY
Status: DISCONTINUED | OUTPATIENT
Start: 2017-10-18 | End: 2017-10-19 | Stop reason: HOSPADM

## 2017-10-18 RX ORDER — FUROSEMIDE 20 MG/1
20 TABLET ORAL DAILY
Status: DISCONTINUED | OUTPATIENT
Start: 2017-10-18 | End: 2017-10-19 | Stop reason: HOSPADM

## 2017-10-18 RX ORDER — NALOXONE HCL 0.4 MG/ML
0.4 VIAL (ML) INJECTION
Status: DISCONTINUED | OUTPATIENT
Start: 2017-10-18 | End: 2017-10-19 | Stop reason: HOSPADM

## 2017-10-18 RX ORDER — IPRATROPIUM BROMIDE AND ALBUTEROL SULFATE 2.5; .5 MG/3ML; MG/3ML
3 SOLUTION RESPIRATORY (INHALATION)
Status: DISCONTINUED | OUTPATIENT
Start: 2017-10-18 | End: 2017-10-19 | Stop reason: HOSPADM

## 2017-10-18 RX ADMIN — ROPINIROLE 1 MG: 1 TABLET, FILM COATED ORAL at 21:31

## 2017-10-18 RX ADMIN — BUDESONIDE 0.5 MG: 0.5 INHALANT RESPIRATORY (INHALATION) at 20:22

## 2017-10-18 RX ADMIN — SODIUM CHLORIDE SOLN NEBU 7% 4 ML: 7 NEBU SOLN at 07:27

## 2017-10-18 RX ADMIN — SODIUM CHLORIDE SOLN NEBU 7% 4 ML: 7 NEBU SOLN at 20:32

## 2017-10-18 RX ADMIN — IPRATROPIUM BROMIDE AND ALBUTEROL SULFATE 3 ML: .5; 3 SOLUTION RESPIRATORY (INHALATION) at 12:18

## 2017-10-18 RX ADMIN — VITAMIN D, TAB 1000IU (100/BT) 1000 UNITS: 25 TAB at 09:06

## 2017-10-18 RX ADMIN — IPRATROPIUM BROMIDE AND ALBUTEROL SULFATE 3 ML: .5; 3 SOLUTION RESPIRATORY (INHALATION) at 20:22

## 2017-10-18 RX ADMIN — Medication 400 MG: at 21:31

## 2017-10-18 RX ADMIN — METHYLPREDNISOLONE SODIUM SUCCINATE 40 MG: 40 INJECTION, POWDER, FOR SOLUTION INTRAMUSCULAR; INTRAVENOUS at 21:32

## 2017-10-18 RX ADMIN — BUDESONIDE 0.5 MG: 0.5 INHALANT RESPIRATORY (INHALATION) at 07:27

## 2017-10-18 RX ADMIN — FLUOXETINE HYDROCHLORIDE 40 MG: 20 CAPSULE ORAL at 09:06

## 2017-10-18 RX ADMIN — AZITHROMYCIN 250 MG: 250 TABLET, FILM COATED ORAL at 17:42

## 2017-10-18 RX ADMIN — FUROSEMIDE 20 MG: 20 TABLET ORAL at 09:06

## 2017-10-18 RX ADMIN — OXYCODONE HYDROCHLORIDE AND ACETAMINOPHEN 250 MG: 500 TABLET ORAL at 09:06

## 2017-10-18 RX ADMIN — IPRATROPIUM BROMIDE AND ALBUTEROL SULFATE 3 ML: .5; 3 SOLUTION RESPIRATORY (INHALATION) at 07:27

## 2017-10-18 RX ADMIN — METHYLPREDNISOLONE SODIUM SUCCINATE 40 MG: 40 INJECTION, POWDER, FOR SOLUTION INTRAMUSCULAR; INTRAVENOUS at 09:08

## 2017-10-18 RX ADMIN — IPRATROPIUM BROMIDE AND ALBUTEROL SULFATE 3 ML: .5; 3 SOLUTION RESPIRATORY (INHALATION) at 15:09

## 2017-10-18 RX ADMIN — Medication 400 MG: at 09:06

## 2017-10-19 VITALS
SYSTOLIC BLOOD PRESSURE: 143 MMHG | BODY MASS INDEX: 22.68 KG/M2 | RESPIRATION RATE: 18 BRPM | HEART RATE: 93 BPM | WEIGHT: 162 LBS | TEMPERATURE: 97.5 F | HEIGHT: 71 IN | DIASTOLIC BLOOD PRESSURE: 71 MMHG | OXYGEN SATURATION: 94 %

## 2017-10-19 RX ORDER — PREDNISONE 10 MG/1
TABLET ORAL
Qty: 31 TABLET | Refills: 0 | Status: SHIPPED | OUTPATIENT
Start: 2017-10-20 | End: 2017-11-02

## 2017-10-19 RX ORDER — AZITHROMYCIN 250 MG/1
TABLET, FILM COATED ORAL
Qty: 4 TABLET | Refills: 0 | Status: SHIPPED | OUTPATIENT
Start: 2017-10-20 | End: 2017-10-27

## 2017-10-19 RX ADMIN — Medication 400 MG: at 17:20

## 2017-10-19 RX ADMIN — Medication 400 MG: at 08:58

## 2017-10-19 RX ADMIN — AZITHROMYCIN 250 MG: 250 TABLET, FILM COATED ORAL at 11:32

## 2017-10-19 RX ADMIN — VITAMIN D, TAB 1000IU (100/BT) 1000 UNITS: 25 TAB at 08:58

## 2017-10-19 RX ADMIN — IPRATROPIUM BROMIDE AND ALBUTEROL SULFATE 3 ML: .5; 3 SOLUTION RESPIRATORY (INHALATION) at 10:54

## 2017-10-19 RX ADMIN — FUROSEMIDE 20 MG: 20 TABLET ORAL at 08:58

## 2017-10-19 RX ADMIN — OXYCODONE HYDROCHLORIDE AND ACETAMINOPHEN 250 MG: 500 TABLET ORAL at 08:58

## 2017-10-19 RX ADMIN — SODIUM CHLORIDE SOLN NEBU 7% 4 ML: 7 NEBU SOLN at 10:54

## 2017-10-19 RX ADMIN — IPRATROPIUM BROMIDE AND ALBUTEROL SULFATE 3 ML: .5; 3 SOLUTION RESPIRATORY (INHALATION) at 06:57

## 2017-10-19 RX ADMIN — BUDESONIDE 0.5 MG: 0.5 INHALANT RESPIRATORY (INHALATION) at 06:57

## 2017-10-19 RX ADMIN — IPRATROPIUM BROMIDE AND ALBUTEROL SULFATE 3 ML: .5; 3 SOLUTION RESPIRATORY (INHALATION) at 15:03

## 2017-10-19 RX ADMIN — METHYLPREDNISOLONE SODIUM SUCCINATE 40 MG: 40 INJECTION, POWDER, FOR SOLUTION INTRAMUSCULAR; INTRAVENOUS at 08:58

## 2017-10-19 RX ADMIN — FLUOXETINE HYDROCHLORIDE 40 MG: 20 CAPSULE ORAL at 08:58

## 2017-10-27 ENCOUNTER — OFFICE VISIT (OUTPATIENT)
Dept: FAMILY MEDICINE CLINIC | Facility: CLINIC | Age: 77
End: 2017-10-27

## 2017-10-27 VITALS
SYSTOLIC BLOOD PRESSURE: 122 MMHG | HEART RATE: 82 BPM | HEIGHT: 71 IN | BODY MASS INDEX: 23.66 KG/M2 | WEIGHT: 169 LBS | OXYGEN SATURATION: 95 % | DIASTOLIC BLOOD PRESSURE: 60 MMHG | TEMPERATURE: 98.5 F

## 2017-10-27 DIAGNOSIS — K21.00 GASTROESOPHAGEAL REFLUX DISEASE WITH ESOPHAGITIS: ICD-10-CM

## 2017-10-27 DIAGNOSIS — E78.5 DYSLIPIDEMIA: ICD-10-CM

## 2017-10-27 DIAGNOSIS — F90.2 ATTENTION DEFICIT HYPERACTIVITY DISORDER (ADHD), COMBINED TYPE: ICD-10-CM

## 2017-10-27 DIAGNOSIS — J44.9 CHRONIC OBSTRUCTIVE PULMONARY DISEASE, UNSPECIFIED COPD TYPE (HCC): Primary | ICD-10-CM

## 2017-10-27 PROCEDURE — 99214 OFFICE O/P EST MOD 30 MIN: CPT | Performed by: INTERNAL MEDICINE

## 2017-10-27 RX ORDER — METHYLPHENIDATE HYDROCHLORIDE 10 MG/1
10 TABLET ORAL DAILY
Qty: 30 TABLET | Refills: 0 | Status: SHIPPED | OUTPATIENT
Start: 2017-10-27 | End: 2017-12-28 | Stop reason: SDUPTHER

## 2017-10-27 NOTE — PROGRESS NOTES
Subjective   Tony Leonard is a 77 y.o. male.     History of Present Illness   Patient was seen today for hospital follow-up for acute bronchitis.  Patient finished his antibiotics is doing much better.  Patient is seeing a pulmonologist and follow-up with him in 2 weeks.  Patient needed a refill on his Reglan which will controls his ADHD.  His gastroesophageal reflux is been under good control last several months with over-the-counter medication.  Dyslipidemia is being monitored with labs and is been doing extremely well.    Much of this encounter note is an electronic transcription/translation of spoken language to printed text.  The electronic translation of spoken language may permit erroneous, or at times, nonsensical words or phrases to be inadvertently transcribed.  Although I have reviewed the note for such errors, some may still exist.  The following portions of the patient's history were reviewed and updated as appropriate: allergies, current medications, past family history, past medical history, past social history, past surgical history and problem list.    Review of Systems   Constitutional: Negative for fatigue and fever.   HENT: Positive for congestion. Negative for trouble swallowing.    Eyes: Negative for discharge and visual disturbance.   Respiratory: Negative for choking and shortness of breath.    Cardiovascular: Negative for chest pain and palpitations.   Gastrointestinal: Negative for abdominal pain and blood in stool.   Endocrine: Negative.    Genitourinary: Negative for genital sores and hematuria.   Musculoskeletal: Negative for gait problem and joint swelling.   Skin: Negative for color change, pallor, rash and wound.   Allergic/Immunologic: Positive for environmental allergies. Negative for immunocompromised state.   Neurological: Negative for facial asymmetry and speech difficulty.   Psychiatric/Behavioral: Negative for hallucinations and suicidal ideas.       Objective   Physical  Exam   Constitutional: He is oriented to person, place, and time. He appears well-developed and well-nourished.   HENT:   Head: Normocephalic.   Eyes: Conjunctivae are normal. Pupils are equal, round, and reactive to light.   Neck: Normal range of motion. Neck supple.   Cardiovascular: Normal rate, regular rhythm and normal heart sounds.    Pulmonary/Chest: Effort normal and breath sounds normal. No respiratory distress. He has no wheezes. He has no rales. He exhibits no tenderness.   Abdominal: Soft. Bowel sounds are normal.   Musculoskeletal: Normal range of motion.   Neurological: He is alert and oriented to person, place, and time.   Skin: Skin is warm and dry.   Psychiatric: He has a normal mood and affect. His behavior is normal. Judgment and thought content normal.   Nursing note and vitals reviewed.      Assessment/Plan   Problems Addressed this Visit        Respiratory    COPD (chronic obstructive pulmonary disease) - Primary       Digestive    Gastroesophageal reflux disease       Other    ADD (attention deficit disorder)    Dyslipidemia

## 2017-11-02 ENCOUNTER — OFFICE VISIT (OUTPATIENT)
Dept: INFECTIOUS DISEASES | Facility: CLINIC | Age: 77
End: 2017-11-02

## 2017-11-02 VITALS
TEMPERATURE: 97.8 F | HEIGHT: 71 IN | WEIGHT: 174.2 LBS | RESPIRATION RATE: 14 BRPM | BODY MASS INDEX: 24.39 KG/M2 | SYSTOLIC BLOOD PRESSURE: 114 MMHG | DIASTOLIC BLOOD PRESSURE: 67 MMHG | HEART RATE: 96 BPM

## 2017-11-02 DIAGNOSIS — J47.9 BRONCHIECTASIS WITHOUT COMPLICATION (HCC): Primary | ICD-10-CM

## 2017-11-02 DIAGNOSIS — J44.9 CHRONIC OBSTRUCTIVE PULMONARY DISEASE, UNSPECIFIED COPD TYPE (HCC): ICD-10-CM

## 2017-11-02 PROCEDURE — 99213 OFFICE O/P EST LOW 20 MIN: CPT | Performed by: INTERNAL MEDICINE

## 2017-11-02 NOTE — PROGRESS NOTES
"cc: Patient returns for evaluation of bronchiectasis and MSSA bacteremia.    Since I last saw him, he has been hospitalized twice.  I reviewed those records:  He was admitted with an acute exacerbation of COPD from October 13 to October 17.  He was treated with steroids and bronchodilators and was discharged on doxycycline and steroid taper.  He was readmitted from 1017 through 1019.  Again he was treated for acute exacerbation of COPD with intravenous steroids and bronchodilators.  He also received courses and a box with azithromycin.  Since discharge, the patient reports that he is done well.  He denies any recurrent dyspnea.  He says his primary care physician has discontinued his nebulizers and is not taking any steroids at this time.  He denies constitutional symptoms of infection such as fever, chills, night sweats.      Review of Systems: All other reviewed and negative except as per HPI    Blood pressure 114/67, pulse 96, temperature 97.8 °F (36.6 °C), resp. rate 14, height 71\" (180.3 cm), weight 174 lb 3.2 oz (79 kg).  GENERAL: Awake and alert, in no acute distress.   LUNGS: Clear to auscultation anteriorly with normal respiratory effort.   SKIN: Warm and dry without cutaneous eruptions   PSYCHIATRIC: Appropriate mood, affect, insight, and judgment.       DIAGNOSTICS:  10/13/17 blood cultures, MRSA screen, respiratory PCR panel and respiratory culture were all negative    CXR, independently interpreted from 10/17/17 shows no active disease    Assessment and Plan  Bronchiectasis without complication  Chronic obstructive pulmonary disease, unspecified COPD type    Appears to be well compensated today from respiratory standpoint.  No evidence of pneumonia or infective process ongoing.  He is going to follow-up with pulmonary medicine next week.  He is a received his flu vaccination.  We discussed signs and symptoms of recrudescent infection and when to seek medical attention.  I have not made him follow-up " appointment, but he knows he can return to see me anytime he needs.

## 2017-11-08 ENCOUNTER — EPISODE CHANGES (OUTPATIENT)
Dept: CASE MANAGEMENT | Facility: OTHER | Age: 77
End: 2017-11-08

## 2017-11-21 ENCOUNTER — EPISODE CHANGES (OUTPATIENT)
Dept: CASE MANAGEMENT | Facility: OTHER | Age: 77
End: 2017-11-21

## 2017-11-23 ENCOUNTER — APPOINTMENT (OUTPATIENT)
Dept: CT IMAGING | Facility: HOSPITAL | Age: 77
End: 2017-11-23

## 2017-11-23 ENCOUNTER — APPOINTMENT (OUTPATIENT)
Dept: GENERAL RADIOLOGY | Facility: HOSPITAL | Age: 77
End: 2017-11-23

## 2017-11-23 ENCOUNTER — HOSPITAL ENCOUNTER (EMERGENCY)
Facility: HOSPITAL | Age: 77
Discharge: HOME | End: 2017-11-23
Attending: EMERGENCY MEDICINE | Admitting: EMERGENCY MEDICINE

## 2017-11-23 VITALS
DIASTOLIC BLOOD PRESSURE: 71 MMHG | HEIGHT: 71 IN | RESPIRATION RATE: 18 BRPM | OXYGEN SATURATION: 94 % | TEMPERATURE: 98.7 F | WEIGHT: 180 LBS | BODY MASS INDEX: 25.2 KG/M2 | HEART RATE: 85 BPM | SYSTOLIC BLOOD PRESSURE: 124 MMHG

## 2017-11-23 DIAGNOSIS — I71.20 THORACIC AORTIC ANEURYSM WITHOUT RUPTURE (HCC): ICD-10-CM

## 2017-11-23 DIAGNOSIS — J44.1 COPD EXACERBATION (HCC): Primary | ICD-10-CM

## 2017-11-23 LAB
ALBUMIN SERPL-MCNC: 3.6 G/DL (ref 3.5–5.2)
ALBUMIN/GLOB SERPL: 1.3 G/DL
ALP SERPL-CCNC: 76 U/L (ref 39–117)
ALT SERPL W P-5'-P-CCNC: 15 U/L (ref 1–41)
ANION GAP SERPL CALCULATED.3IONS-SCNC: 13.3 MMOL/L
ARTERIAL PATENCY WRIST A: POSITIVE
AST SERPL-CCNC: 17 U/L (ref 1–40)
ATMOSPHERIC PRESS: 749 MMHG
BASE EXCESS BLDA CALC-SCNC: 2.6 MMOL/L (ref 0–2)
BASOPHILS # BLD AUTO: 0.1 10*3/MM3 (ref 0–0.2)
BASOPHILS NFR BLD AUTO: 1.3 % (ref 0–1.5)
BDY SITE: ABNORMAL
BILIRUB SERPL-MCNC: 0.4 MG/DL (ref 0.1–1.2)
BUN BLD-MCNC: 21 MG/DL (ref 8–23)
BUN/CREAT SERPL: 16.7 (ref 7–25)
CALCIUM SPEC-SCNC: 9.1 MG/DL (ref 8.6–10.5)
CHLORIDE SERPL-SCNC: 102 MMOL/L (ref 98–107)
CO2 SERPL-SCNC: 25.7 MMOL/L (ref 22–29)
CREAT BLD-MCNC: 1.26 MG/DL (ref 0.76–1.27)
D DIMER PPP FEU-MCNC: 1.09 MCGFEU/ML (ref 0–0.49)
D-LACTATE SERPL-SCNC: 2 MMOL/L (ref 0.5–2)
DEPRECATED RDW RBC AUTO: 50.6 FL (ref 37–54)
EOSINOPHIL # BLD AUTO: 0.47 10*3/MM3 (ref 0–0.7)
EOSINOPHIL NFR BLD AUTO: 6.2 % (ref 0.3–6.2)
ERYTHROCYTE [DISTWIDTH] IN BLOOD BY AUTOMATED COUNT: 14.2 % (ref 11.5–14.5)
GAS FLOW AIRWAY: 2 LPM
GFR SERPL CREATININE-BSD FRML MDRD: 55 ML/MIN/1.73
GLOBULIN UR ELPH-MCNC: 2.7 GM/DL
GLUCOSE BLD-MCNC: 110 MG/DL (ref 65–99)
HCO3 BLDA-SCNC: 27 MMOL/L (ref 22–28)
HCT VFR BLD AUTO: 41.2 % (ref 40.4–52.2)
HGB BLD-MCNC: 13.2 G/DL (ref 13.7–17.6)
IMM GRANULOCYTES # BLD: 0 10*3/MM3 (ref 0–0.03)
IMM GRANULOCYTES NFR BLD: 0 % (ref 0–0.5)
INR PPP: 0.98 (ref 0.9–1.1)
LYMPHOCYTES # BLD AUTO: 2.23 10*3/MM3 (ref 0.9–4.8)
LYMPHOCYTES NFR BLD AUTO: 29.6 % (ref 19.6–45.3)
MCH RBC QN AUTO: 31.7 PG (ref 27–32.7)
MCHC RBC AUTO-ENTMCNC: 32 G/DL (ref 32.6–36.4)
MCV RBC AUTO: 98.8 FL (ref 79.8–96.2)
MODALITY: ABNORMAL
MONOCYTES # BLD AUTO: 0.88 10*3/MM3 (ref 0.2–1.2)
MONOCYTES NFR BLD AUTO: 11.7 % (ref 5–12)
NEUTROPHILS # BLD AUTO: 3.86 10*3/MM3 (ref 1.9–8.1)
NEUTROPHILS NFR BLD AUTO: 51.2 % (ref 42.7–76)
NT-PROBNP SERPL-MCNC: 222.6 PG/ML (ref 0–1800)
PCO2 BLDA: 40.3 MM HG (ref 35–45)
PH BLDA: 7.43 PH UNITS (ref 7.35–7.45)
PLATELET # BLD AUTO: 249 10*3/MM3 (ref 140–500)
PMV BLD AUTO: 9.6 FL (ref 6–12)
PO2 BLDA: 85.6 MM HG (ref 80–100)
POTASSIUM BLD-SCNC: 4.2 MMOL/L (ref 3.5–5.2)
PROCALCITONIN SERPL-MCNC: 0.06 NG/ML (ref 0.1–0.25)
PROT SERPL-MCNC: 6.3 G/DL (ref 6–8.5)
PROTHROMBIN TIME: 12.6 SECONDS (ref 11.7–14.2)
RBC # BLD AUTO: 4.17 10*6/MM3 (ref 4.6–6)
SAO2 % BLDCOA: 96.8 % (ref 92–99)
SET MECH RESP RATE: 18
SODIUM BLD-SCNC: 141 MMOL/L (ref 136–145)
TROPONIN T SERPL-MCNC: <0.01 NG/ML (ref 0–0.03)
WBC NRBC COR # BLD: 7.54 10*3/MM3 (ref 4.5–10.7)

## 2017-11-23 PROCEDURE — 83880 ASSAY OF NATRIURETIC PEPTIDE: CPT | Performed by: EMERGENCY MEDICINE

## 2017-11-23 PROCEDURE — 96374 THER/PROPH/DIAG INJ IV PUSH: CPT

## 2017-11-23 PROCEDURE — 94640 AIRWAY INHALATION TREATMENT: CPT

## 2017-11-23 PROCEDURE — 96375 TX/PRO/DX INJ NEW DRUG ADDON: CPT

## 2017-11-23 PROCEDURE — 85379 FIBRIN DEGRADATION QUANT: CPT | Performed by: EMERGENCY MEDICINE

## 2017-11-23 PROCEDURE — 0 IOPAMIDOL PER 1 ML: Performed by: EMERGENCY MEDICINE

## 2017-11-23 PROCEDURE — 85610 PROTHROMBIN TIME: CPT | Performed by: EMERGENCY MEDICINE

## 2017-11-23 PROCEDURE — 99284 EMERGENCY DEPT VISIT MOD MDM: CPT

## 2017-11-23 PROCEDURE — 83605 ASSAY OF LACTIC ACID: CPT | Performed by: EMERGENCY MEDICINE

## 2017-11-23 PROCEDURE — 85025 COMPLETE CBC W/AUTO DIFF WBC: CPT | Performed by: EMERGENCY MEDICINE

## 2017-11-23 PROCEDURE — 93010 ELECTROCARDIOGRAM REPORT: CPT | Performed by: INTERNAL MEDICINE

## 2017-11-23 PROCEDURE — 84145 PROCALCITONIN (PCT): CPT | Performed by: EMERGENCY MEDICINE

## 2017-11-23 PROCEDURE — 80053 COMPREHEN METABOLIC PANEL: CPT | Performed by: EMERGENCY MEDICINE

## 2017-11-23 PROCEDURE — 25010000002 FUROSEMIDE PER 20 MG: Performed by: EMERGENCY MEDICINE

## 2017-11-23 PROCEDURE — 71010 HC CHEST PA OR AP: CPT

## 2017-11-23 PROCEDURE — 36600 WITHDRAWAL OF ARTERIAL BLOOD: CPT

## 2017-11-23 PROCEDURE — 71275 CT ANGIOGRAPHY CHEST: CPT

## 2017-11-23 PROCEDURE — 87040 BLOOD CULTURE FOR BACTERIA: CPT | Performed by: EMERGENCY MEDICINE

## 2017-11-23 PROCEDURE — 25010000002 METHYLPREDNISOLONE PER 125 MG: Performed by: EMERGENCY MEDICINE

## 2017-11-23 PROCEDURE — 93005 ELECTROCARDIOGRAM TRACING: CPT | Performed by: EMERGENCY MEDICINE

## 2017-11-23 PROCEDURE — 84484 ASSAY OF TROPONIN QUANT: CPT | Performed by: EMERGENCY MEDICINE

## 2017-11-23 PROCEDURE — 94799 UNLISTED PULMONARY SVC/PX: CPT

## 2017-11-23 PROCEDURE — 82803 BLOOD GASES ANY COMBINATION: CPT

## 2017-11-23 RX ORDER — ALBUTEROL SULFATE 2.5 MG/3ML
2.5 SOLUTION RESPIRATORY (INHALATION) ONCE
Status: COMPLETED | OUTPATIENT
Start: 2017-11-23 | End: 2017-11-23

## 2017-11-23 RX ORDER — FUROSEMIDE 10 MG/ML
40 INJECTION INTRAMUSCULAR; INTRAVENOUS ONCE
Status: COMPLETED | OUTPATIENT
Start: 2017-11-23 | End: 2017-11-23

## 2017-11-23 RX ORDER — METHYLPREDNISOLONE SODIUM SUCCINATE 125 MG/2ML
125 INJECTION, POWDER, LYOPHILIZED, FOR SOLUTION INTRAMUSCULAR; INTRAVENOUS ONCE
Status: COMPLETED | OUTPATIENT
Start: 2017-11-23 | End: 2017-11-23

## 2017-11-23 RX ORDER — LORAZEPAM 1 MG/1
1 TABLET ORAL ONCE
Status: COMPLETED | OUTPATIENT
Start: 2017-11-23 | End: 2017-11-23

## 2017-11-23 RX ORDER — METHYLPREDNISOLONE 4 MG/1
TABLET ORAL
Qty: 21 EACH | Refills: 0 | Status: SHIPPED | OUTPATIENT
Start: 2017-11-23 | End: 2018-01-03

## 2017-11-23 RX ADMIN — METHYLPREDNISOLONE SODIUM SUCCINATE 125 MG: 125 INJECTION, POWDER, FOR SOLUTION INTRAMUSCULAR; INTRAVENOUS at 18:47

## 2017-11-23 RX ADMIN — LORAZEPAM 1 MG: 1 TABLET ORAL at 19:05

## 2017-11-23 RX ADMIN — ALBUTEROL SULFATE 2.5 MG: 2.5 SOLUTION RESPIRATORY (INHALATION) at 18:38

## 2017-11-23 RX ADMIN — IOPAMIDOL 95 ML: 755 INJECTION, SOLUTION INTRAVENOUS at 21:01

## 2017-11-23 RX ADMIN — FUROSEMIDE 40 MG: 10 INJECTION, SOLUTION INTRAMUSCULAR; INTRAVENOUS at 18:55

## 2017-11-23 NOTE — ED PROVIDER NOTES
EMERGENCY DEPARTMENT ENCOUNTER    CHIEF COMPLAINT  Chief Complaint: SOA  History given by: patient and EMS  History limited by: nothing   Room Number: 17/17  PMD: Erich Aviles MD  Pulmonologist: Dr. Salter    HPI:  Pt is a 77 y.o. male with a h/o COPD who presents complaining of acutely worse SOA onset 1 hour ago. Pt states he felt normal until using his saline nebulizer treatment that he was prescribed after recent pneumonia. Pt received a duo-neb treatment with EMS en route, which resolved pt's wheezing. Pt complains of chest tightness. He is on 2 L O2 at home as needed. In addition to COPD and recent pneumonia, pt also has a h/o bronchiectasis.     Onset: 1 hour ago  Timing: constant   Location: generalized   Radiation: none  Quality: acute on chronic   Intensity/Severity: moderate   Progression: improved since EMS treatments   Associated Symptoms: chest tightness, wheezing   Aggravating Factors: none specified   Alleviating Factors: none specified   Previous Episodes: Pt has a h/o COPD, pneumonia, and bronchiectasis   Treatment before arrival: duo-neb from EMS     PAST MEDICAL HISTORY  Active Ambulatory Problems     Diagnosis Date Noted   • Thrush, oral 03/11/2016   • ADD (attention deficit disorder) 03/11/2016   • Hemorrhoids 03/11/2016   • Bronchiectasis with acute lower respiratory infection 03/11/2016   • Diverticul disease small and large intestine, no perforati or abscess 03/11/2016   • Benign non-nodular prostatic hyperplasia without lower urinary tract symptoms 03/11/2016   • Gastroesophageal reflux disease 03/11/2016   • Malaise and fatigue 03/11/2016   • Environmental allergies 04/25/2016   • Hemoptysis 04/27/2016   • Dyslipidemia 05/11/2016   • Primary osteoarthritis involving multiple joints 05/11/2016   • Pneumonia of left upper lobe due to infectious organism 10/03/2016   • Abnormal EKG 10/04/2016   • Chronic respiratory failure with hypoxia 10/04/2016   • COPD (chronic obstructive pulmonary  disease) 10/04/2016   • Pneumonia of left lower lobe due to infectious organism 03/26/2017   • Mild malnutrition 03/28/2017   • Acute respiratory failure with hypoxia 04/27/2017   • Bronchitis 06/01/2017   • COPD exacerbation 06/17/2017     Resolved Ambulatory Problems     Diagnosis Date Noted   • Pneumonia of both lower lobes due to infectious organism 03/11/2016   • Pneumonia of right upper lobe due to infectious organism 04/22/2016   • COPD exacerbation 04/25/2016   • GERD (gastroesophageal reflux disease) 04/25/2016   • Bacterial lobar pneumonia 12/27/2016     Past Medical History:   Diagnosis Date   • ADHD (attention deficit hyperactivity disorder)    • Bronchiectasis    • Colon polyp    • COPD (chronic obstructive pulmonary disease)    • Diverticulosis    • On home oxygen therapy    • Pneumonia        PAST SURGICAL HISTORY  Past Surgical History:   Procedure Laterality Date   • BRONCHOSCOPY N/A 4/30/2016    Procedure: BRONCHOSCOPY with BAL of right lower lobe and left  lower lobe.  ;  Surgeon: Nando Diaz MD;  Location: [facility] ENDOSCOPY;  Service:    • BRONCHOSCOPY N/A 4/28/2017    Procedure: BRONCHOSCOPY;  Surgeon: Katharina Salter MD;  Location: [facility] ENDOSCOPY;  Service:    • BRONCHOSCOPY Bilateral 6/29/2017    Procedure: BRONCHOSCOPY WITH WASHINGS;  Surgeon: Katharina Salter MD;  Location: [facility] ENDOSCOPY;  Service:    • COLONOSCOPY  05/17/2013    eh, ih, tort, sig tics   • ENDOSCOPY  03/19/2015    z line irreg, hh   • HERNIA REPAIR     • SPINE SURGERY     • TONSILLECTOMY         FAMILY HISTORY  Family History   Problem Relation Age of Onset   • Thyroid disease Mother    • No Known Problems Father        SOCIAL HISTORY  Social History     Social History   • Marital status: Single     Spouse name: N/A   • Number of children: N/A   • Years of education: N/A     Occupational History   • Not on file.     Social History Main Topics   • Smoking status: Never Smoker   • Smokeless tobacco: Never Used   •  Alcohol use Yes      Comment: red wine occassional   • Drug use: No   • Sexual activity: Defer     Other Topics Concern   • Not on file     Social History Narrative       ALLERGIES  Latex and Sulfa antibiotics    REVIEW OF SYSTEMS  Review of Systems   Constitutional: Negative for fever.   Respiratory: Positive for chest tightness, shortness of breath and wheezing (per EMS, resolved).    Cardiovascular: Negative for chest pain.       PHYSICAL EXAM  ED Triage Vitals   Temp Heart Rate Resp BP SpO2   11/23/17 1756 11/23/17 1756 11/23/17 1756 11/23/17 1756 11/23/17 1756   97.7 °F (36.5 °C) 93 30 119/83 97 %      Temp src Heart Rate Source Patient Position BP Location FiO2 (%)   11/23/17 1756 11/23/17 1756 -- -- --   Tympanic Monitor          Physical Exam   Constitutional: He is oriented to person, place, and time.   HENT:   Head: Normocephalic and atraumatic.   Mouth/Throat: Oropharynx is clear and moist.   Eyes: Pupils are equal, round, and reactive to light.   Neck: Neck supple.   Cardiovascular: Normal rate and regular rhythm.    Pulmonary/Chest: Tachypnea noted. He is in respiratory distress (moderate). He has decreased breath sounds (bilaterally).   Abdominal: Soft. Bowel sounds are normal. He exhibits no distension. There is no tenderness.   Musculoskeletal: He exhibits edema (1+ BLE).   Neurological: He is alert and oriented to person, place, and time.   Skin: Skin is warm and dry.   Nursing note and vitals reviewed.      LAB RESULTS  Lab Results (last 24 hours)     Procedure Component Value Units Date/Time    Blood Gas, Arterial [[id number]]  (Abnormal) Collected:  11/23/17 1835    Specimen:  Arterial Blood Updated:  11/23/17 1837     Site Arterial: right radial     Oniel's Test Positive     pH, Arterial 7.435 pH units      pCO2, Arterial 40.3 mm Hg      pO2, Arterial 85.6 mm Hg      HCO3, Arterial 27.0 mmol/L      Base Excess, Arterial 2.6 (H) mmol/L      O2 Saturation Calculated 96.8 %      Barometric Pressure  for Blood Gas 749.0 mmHg      Modality Cannula     Flow Rate 2 lpm      Set [facility] Resp Rate 18    Narrative:       SAT 98 Meter: [id number] : [id number] Kimberlyn Kwok    Lactic Acid, Plasma [[id number]]  (Normal) Collected:  11/23/17 1842    Specimen:  Blood Updated:  11/23/17 1915     Lactate 2.0 mmol/L     CBC & Differential [[id number]] Collected:  11/23/17 1843    Specimen:  Blood Updated:  11/23/17 1856    Narrative:       The following orders were created for panel order CBC & Differential.  Procedure                               Abnormality         Status                     ---------                               -----------         ------                     CBC Auto Differential[[id number]]        Abnormal            Final result                 Please view results for these tests on the individual orders.    Comprehensive Metabolic Panel [[id number]]  (Abnormal) Collected:  11/23/17 1843    Specimen:  Blood Updated:  11/23/17 1916     Glucose 110 (H) mg/dL      BUN 21 mg/dL      Creatinine 1.26 mg/dL      Sodium 141 mmol/L      Potassium 4.2 mmol/L      Chloride 102 mmol/L      CO2 25.7 mmol/L      Calcium 9.1 mg/dL      Total Protein 6.3 g/dL      Albumin 3.60 g/dL      ALT (SGPT) 15 U/L      AST (SGOT) 17 U/L      Alkaline Phosphatase 76 U/L      Total Bilirubin 0.4 mg/dL      eGFR Non African Amer 55 (L) mL/min/1.73      Globulin 2.7 gm/dL      A/G Ratio 1.3 g/dL      BUN/Creatinine Ratio 16.7     Anion Gap 13.3 mmol/L     Narrative:       The MDRD GFR formula is only valid for adults with stable renal function between ages 18 and 70.    Protime-INR [[id number]]  (Normal) Collected:  11/23/17 1843    Specimen:  Blood Updated:  11/23/17 1904     Protime 12.6 Seconds      INR 0.98    BNP [[id number]]  (Normal) Collected:  11/23/17 1843    Specimen:  Blood Updated:  11/23/17 1921     proBNP 222.6 pg/mL     Narrative:       Among patients with dyspnea, NT-proBNP is highly sensitive for the detection  "of acute congestive heart failure. In addition NT-proBNP of <300 pg/ml effectively rules out acute congestive heart failure with 99% negative predictive value.    D-dimer, Quantitative [[id number]]  (Abnormal) Collected:  11/23/17 1843    Specimen:  Blood Updated:  11/23/17 1904     D-Dimer, Quantitative 1.09 (H) MCGFEU/mL     Narrative:       The Stago D-Dimer test used in conjunction with a clinical pretest probability (PTP) assessment model, has been approved by the FDA to rule out the presence of venous thromboembolism (VTE) in outpatients suspected of deep venous thrombosis (DVT) or pulmonary embolism (PE).     Troponin [[id number]]  (Normal) Collected:  11/23/17 1843    Specimen:  Blood Updated:  11/23/17 1921     Troponin T <0.010 ng/mL     Narrative:       Troponin T Reference Ranges:  Less than 0.03 ng/mL:    Negative for AMI  0.03 to 0.09 ng/mL:      Indeterminant for AMI  Greater than 0.09 ng/mL: Positive for AMI    Procalcitonin [[id number]]  (Abnormal) Collected:  11/23/17 1843    Specimen:  Blood Updated:  11/23/17 1921     Procalcitonin 0.06 (L) ng/mL     Narrative:       As a Marker for Sepsis (Non-Neonates):   1. <0.5 ng/mL represents a low risk of severe sepsis and/or septic shock.  1. >2 ng/mL represents a high risk of severe sepsis and/or septic shock.    As a Marker for Lower Respiratory Tract Infections that require antibiotic therapy:  PCT on Admission     Antibiotic Therapy             6-12 Hrs later  > 0.5                Strongly Recommended            >0.25 - <0.5         Recommended  0.1 - 0.25           Discouraged                   Remeasure/reassess PCT  <0.1                 Strongly Discouraged          Remeasure/reassess PCT      As 28 day mortality risk marker: \"Change in Procalcitonin Result\" (> 80 % or <=80 %) if Day 0 (or Day 1) and Day 4 values are available. Refer to http://www.[facility]-pct-calculator.com/   Change in PCT <=80 %   A decrease of PCT levels below or equal to 80 % " defines a positive change in PCT test result representing a higher risk for 28-day all-cause mortality of patients diagnosed with severe sepsis or septic shock.  Change in PCT > 80 %   A decrease of PCT levels of more than 80 % defines a negative change in PCT result representing a lower risk for 28-day all-cause mortality of patients diagnosed with severe sepsis or septic shock.                CBC Auto Differential [[id number]]  (Abnormal) Collected:  11/23/17 1843    Specimen:  Blood Updated:  11/23/17 1856     WBC 7.54 10*3/mm3      RBC 4.17 (L) 10*6/mm3      Hemoglobin 13.2 (L) g/dL      Hematocrit 41.2 %      MCV 98.8 (H) fL      MCH 31.7 pg      MCHC 32.0 (L) g/dL      RDW 14.2 %      RDW-SD 50.6 fl      MPV 9.6 fL      Platelets 249 10*3/mm3      Neutrophil % 51.2 %      Lymphocyte % 29.6 %      Monocyte % 11.7 %      Eosinophil % 6.2 %      Basophil % 1.3 %      Immature Grans % 0.0 %      Neutrophils, Absolute 3.86 10*3/mm3      Lymphocytes, Absolute 2.23 10*3/mm3      Monocytes, Absolute 0.88 10*3/mm3      Eosinophils, Absolute 0.47 10*3/mm3      Basophils, Absolute 0.10 10*3/mm3      Immature Grans, Absolute 0.00 10*3/mm3     Blood Culture - Blood, [[id number]] Collected:  11/23/17 1949    Specimen:  Blood from Arm, Right Updated:  11/23/17 2007    Blood Culture - Blood, [[id number]] Collected:  11/23/17 1954    Specimen:  Blood from Arm, Left Updated:  11/23/17 2007          I ordered the above labs and reviewed the results    RADIOLOGY  CT Angiogram Chest With Contrast   Final Result   1. No active airspace disease or pulmonary embolism.   2. 4.5 cm ascending thoracic aortic aneurysm without leak or rupture       This report was finalized on 11/23/2017 9:15 PM by Juan Pino MD.          XR Chest 1 View            CXR shows atelectasis vs infiltrate in left lung base that has worsened since prior. Chronic interstitial changes but no signs of effusion or CHF.     I ordered the above noted radiological  studies. Interpreted by radiologist.  Reviewed by me in PACS.       PROCEDURES  Procedures    EKG  EKG time: 1855  Rhythm/Rate: NSR at 82  PVC  No Acute Ischemia  Non-Specific ST-T changes    Unchanged compared to prior on 10/17/17    Interpreted Contemporaneously by me.  Independently viewed by me  PROGRESS AND CONSULTS  ED Course     1820: Ordered CXR, EKG, and labs for further evaluation. Also ordered 125 mg solu-medrol, albuterol, and 40 mg Lasix to treat SOA and edema respectively.     1858: Ordered 1 mg Ativan because pt has begun complaining of leg cramps.     1910: Ordered CTA Chest to rule out PE since d-dimer was positive.     1928: Pt is resting comfortably, reclined in bed.     2201: Rechecked pt. Pt states that he feels much better. Discussed thoracic aneurysm seen on CTA, and pt states he has been told about this before. I explained that thus aneurysm will continue to require f/u. I will consult pt's pulmonologist.     MEDICAL DECISION MAKING  Results were reviewed/discussed with the patient and they were also made aware of online access. Pt also made aware that some labs, such as cultures, will not be resulted during ER visit and follow up with PMD is necessary.     MDM  Number of Diagnoses or Management Options     Amount and/or Complexity of Data Reviewed  Clinical lab tests: ordered and reviewed  Tests in the radiology section of CPT®: reviewed and ordered  Tests in the medicine section of CPT®: ordered and reviewed    Patient Progress  Patient progress: stable         DIAGNOSIS  Final diagnoses:   COPD exacerbation   Thoracic aortic aneurysm without rupture       DISPOSITION  DISCHARGE    Patient discharged in stable condition.    Reviewed implications of results, diagnosis, meds, responsibility to follow up, warning signs and symptoms of possible worsening, potential complications and reasons to return to ER.    Patient/Family voiced understanding of above instructions.    Discussed plan for  discharge, as there is no emergent indication for admission.  Pt/family is agreeable and understands need for follow up and repeat testing.  Pt is aware that discharge does not mean that nothing is wrong but it indicates no emergency is present that requires admission and they must continue care with follow-up as given below or physician of their choice.     FOLLOW-UP  Erich Aviles MD  [address] [facility] [id number] [phone]    In 1 week  to recheck lungs and follow up on stable aortic aneurysm         Medication List      New Prescriptions          MethylPREDNISolone 4 MG tablet   Commonly known as:  MEDROL (ZAFAR)   Take as directed on package instructions.           Latest Documented Vital Signs:  As of 11:02 PM  BP- 116/65 HR- 80 Temp- 97.7 °F (36.5 °C) (Tympanic) O2 sat- 98%    --  Documentation assistance provided by shan Tang for Lexa Montesinos.  Information recorded by the scribe was done at my direction and has been verified and validated by me.             Abbey Tang  11/23/17 [id number]       Lexa Montesinos MD  11/23/17 [id number]

## 2017-11-28 LAB
BACTERIA SPEC AEROBE CULT: NORMAL
BACTERIA SPEC AEROBE CULT: NORMAL

## 2017-12-28 ENCOUNTER — TELEPHONE (OUTPATIENT)
Dept: FAMILY MEDICINE CLINIC | Facility: CLINIC | Age: 77
End: 2017-12-28

## 2017-12-28 RX ORDER — METHYLPHENIDATE HYDROCHLORIDE 10 MG/1
10 TABLET ORAL DAILY
Qty: 30 TABLET | Refills: 0 | Status: ON HOLD | OUTPATIENT
Start: 2017-12-28 | End: 2018-01-29

## 2017-12-28 NOTE — TELEPHONE ENCOUNTER
Patient requesting refill on methylphenidate( Ritalin) 10mg Daily.  Last seen 10/27/17, contract current, [facility], needs urine drug screen

## 2018-01-03 ENCOUNTER — OFFICE VISIT (OUTPATIENT)
Dept: FAMILY MEDICINE CLINIC | Facility: CLINIC | Age: 78
End: 2018-01-03

## 2018-01-03 VITALS
OXYGEN SATURATION: 95 % | WEIGHT: 173.2 LBS | SYSTOLIC BLOOD PRESSURE: 138 MMHG | TEMPERATURE: 98.3 F | BODY MASS INDEX: 24.25 KG/M2 | DIASTOLIC BLOOD PRESSURE: 72 MMHG | HEIGHT: 71 IN | HEART RATE: 89 BPM

## 2018-01-03 DIAGNOSIS — J47.0 BRONCHIECTASIS WITH ACUTE LOWER RESPIRATORY INFECTION (HCC): ICD-10-CM

## 2018-01-03 DIAGNOSIS — J44.9 CHRONIC OBSTRUCTIVE PULMONARY DISEASE, UNSPECIFIED COPD TYPE (HCC): ICD-10-CM

## 2018-01-03 DIAGNOSIS — J40 BRONCHITIS: Primary | ICD-10-CM

## 2018-01-03 DIAGNOSIS — M48.07 SPINAL STENOSIS OF LUMBOSACRAL REGION: ICD-10-CM

## 2018-01-03 PROCEDURE — 99214 OFFICE O/P EST MOD 30 MIN: CPT | Performed by: INTERNAL MEDICINE

## 2018-01-03 RX ORDER — POTASSIUM CHLORIDE 750 MG/1
CAPSULE, EXTENDED RELEASE ORAL
COMMUNITY
Start: 2017-11-15 | End: 2018-02-01 | Stop reason: HOSPADM

## 2018-01-03 RX ORDER — PANTOPRAZOLE SODIUM 40 MG/1
TABLET, DELAYED RELEASE ORAL
Status: ON HOLD | COMMUNITY
Start: 2017-11-16 | End: 2018-01-29

## 2018-01-03 RX ORDER — AZITHROMYCIN 250 MG/1
250 TABLET, FILM COATED ORAL EVERY OTHER DAY
COMMUNITY
Start: 2017-11-15 | End: 2018-02-01 | Stop reason: HOSPADM

## 2018-01-03 NOTE — PROGRESS NOTES
Subjective   Tony Leonard is a 77 y.o. male.     History of Present Illness   Patient is following up from the bronchitis.  He was placed on antibiotics and bronchodilators and greatly improved.  Patient does have bronchiectasis with frequent infections.  He does see a pulmonologist who thought he was stable this time.  He did have an MRI which showed severe spinal stenosis at multiple levels all lumbar sacral spine.  He has seen a back specialist at [place].  Patient's COPD is been well-controlled with bronchodilators.    Much of this encounter note is an electronic transcription/translation of spoken language to printed text.  The electronic translation of spoken language may permit erroneous, or at times, nonsensical words or phrases to be inadvertently transcribed.  Although I have reviewed the note for such errors, some may still exist.  The following portions of the patient's history were reviewed and updated as appropriate: allergies, current medications, past family history, past medical history, past social history, past surgical history and problem list.    Review of Systems   Constitutional: Negative for fatigue and fever.   HENT: Positive for congestion. Negative for trouble swallowing.    Eyes: Negative for discharge and visual disturbance.   Respiratory: Negative for choking and shortness of breath.    Cardiovascular: Negative for chest pain and palpitations.   Gastrointestinal: Negative for abdominal pain and blood in stool.   Endocrine: Negative.    Genitourinary: Negative for genital sores and hematuria.   Musculoskeletal: Negative for gait problem and joint swelling.   Skin: Negative for color change, pallor, rash and wound.   Allergic/Immunologic: Positive for environmental allergies. Negative for immunocompromised state.   Neurological: Negative for facial asymmetry and speech difficulty.   Psychiatric/Behavioral: Negative for hallucinations and suicidal ideas.       Objective   Physical Exam    Constitutional: He is oriented to person, place, and time. He appears well-developed and well-nourished.   HENT:   Head: Normocephalic.   Eyes: Conjunctivae are normal. Pupils are equal, round, and reactive to light.   Neck: Normal range of motion. Neck supple.   Cardiovascular: Normal rate, regular rhythm and normal heart sounds.    Pulmonary/Chest: Effort normal and breath sounds normal.   Abdominal: Soft. Bowel sounds are normal.   Musculoskeletal: Normal range of motion.   Neurological: He is alert and oriented to person, place, and time.   Skin: Skin is warm and dry.   Psychiatric: He has a normal mood and affect. His behavior is normal. Judgment and thought content normal.   Nursing note and vitals reviewed.      Assessment/Plan   Problems Addressed this Visit        Respiratory    Bronchiectasis with acute lower respiratory infection    Relevant Medications    azithromycin (ZITHROMAX) 250 MG tablet    COPD (chronic obstructive pulmonary disease)    Bronchitis - Primary      Other Visit Diagnoses     Spinal stenosis of lumbosacral region        Relevant Orders    Ambulatory Referral to Physical Therapy Evaluate and treat, Ortho (Completed)

## 2018-01-12 ENCOUNTER — TELEPHONE (OUTPATIENT)
Dept: FAMILY MEDICINE CLINIC | Facility: CLINIC | Age: 78
End: 2018-01-12

## 2018-01-16 ENCOUNTER — APPOINTMENT (OUTPATIENT)
Dept: CT IMAGING | Facility: HOSPITAL | Age: 78
End: 2018-01-16

## 2018-01-16 ENCOUNTER — APPOINTMENT (OUTPATIENT)
Dept: GENERAL RADIOLOGY | Facility: HOSPITAL | Age: 78
End: 2018-01-16

## 2018-01-16 ENCOUNTER — HOSPITAL ENCOUNTER (INPATIENT)
Facility: HOSPITAL | Age: 78
LOS: 16 days | Discharge: NURSING, REHAB OR LONG-TERM CARE | End: 2018-02-01
Attending: EMERGENCY MEDICINE | Admitting: INTERNAL MEDICINE

## 2018-01-16 DIAGNOSIS — S72.141A FRACTURE, INTERTROCHANTERIC, RIGHT FEMUR, CLOSED, INITIAL ENCOUNTER (HCC): Primary | ICD-10-CM

## 2018-01-16 DIAGNOSIS — R04.2 HEMOPTYSIS: ICD-10-CM

## 2018-01-16 DIAGNOSIS — J18.9 PNEUMONIA: ICD-10-CM

## 2018-01-16 DIAGNOSIS — T17.500A MUCUS PLUGGING OF BRONCHI: ICD-10-CM

## 2018-01-16 DIAGNOSIS — Z87.09 HISTORY OF COPD: ICD-10-CM

## 2018-01-16 DIAGNOSIS — R26.2 DIFFICULTY WALKING: ICD-10-CM

## 2018-01-16 PROBLEM — S00.03XA HEMATOMA OF FRONTAL SCALP: Status: ACTIVE | Noted: 2018-01-16

## 2018-01-16 PROBLEM — D72.829 LEUKOCYTOSIS: Status: ACTIVE | Noted: 2018-01-16

## 2018-01-16 PROBLEM — I51.89 DIASTOLIC DYSFUNCTION WITHOUT HEART FAILURE: Status: ACTIVE | Noted: 2018-01-16

## 2018-01-16 LAB
ALBUMIN SERPL-MCNC: 3.5 G/DL (ref 3.5–5.2)
ALBUMIN/GLOB SERPL: 1.2 G/DL
ALP SERPL-CCNC: 73 U/L (ref 39–117)
ALT SERPL W P-5'-P-CCNC: 21 U/L (ref 1–41)
ANION GAP SERPL CALCULATED.3IONS-SCNC: 11.3 MMOL/L
AST SERPL-CCNC: 21 U/L (ref 1–40)
BASOPHILS # BLD AUTO: 0.03 10*3/MM3 (ref 0–0.2)
BASOPHILS NFR BLD AUTO: 0.2 % (ref 0–1.5)
BILIRUB SERPL-MCNC: 0.6 MG/DL (ref 0.1–1.2)
BILIRUB UR QL STRIP: NEGATIVE
BUN BLD-MCNC: 16 MG/DL (ref 8–23)
BUN/CREAT SERPL: 16.8 (ref 7–25)
CALCIUM SPEC-SCNC: 9.1 MG/DL (ref 8.6–10.5)
CHLORIDE SERPL-SCNC: 101 MMOL/L (ref 98–107)
CLARITY UR: CLEAR
CO2 SERPL-SCNC: 25.7 MMOL/L (ref 22–29)
COLOR UR: YELLOW
CREAT BLD-MCNC: 0.95 MG/DL (ref 0.76–1.27)
DEPRECATED RDW RBC AUTO: 46.7 FL (ref 37–54)
EOSINOPHIL # BLD AUTO: 0.51 10*3/MM3 (ref 0–0.7)
EOSINOPHIL NFR BLD AUTO: 3.6 % (ref 0.3–6.2)
ERYTHROCYTE [DISTWIDTH] IN BLOOD BY AUTOMATED COUNT: 13.3 % (ref 11.5–14.5)
GFR SERPL CREATININE-BSD FRML MDRD: 76 ML/MIN/1.73
GLOBULIN UR ELPH-MCNC: 2.9 GM/DL
GLUCOSE BLD-MCNC: 122 MG/DL (ref 65–99)
GLUCOSE UR STRIP-MCNC: NEGATIVE MG/DL
HCT VFR BLD AUTO: 40.5 % (ref 40.4–52.2)
HGB BLD-MCNC: 13.4 G/DL (ref 13.7–17.6)
HGB UR QL STRIP.AUTO: NEGATIVE
IMM GRANULOCYTES # BLD: 0.06 10*3/MM3 (ref 0–0.03)
IMM GRANULOCYTES NFR BLD: 0.4 % (ref 0–0.5)
INR PPP: 1.16 (ref 0.9–1.1)
KETONES UR QL STRIP: NEGATIVE
LEUKOCYTE ESTERASE UR QL STRIP.AUTO: NEGATIVE
LYMPHOCYTES # BLD AUTO: 1.42 10*3/MM3 (ref 0.9–4.8)
LYMPHOCYTES NFR BLD AUTO: 9.9 % (ref 19.6–45.3)
MCH RBC QN AUTO: 31.9 PG (ref 27–32.7)
MCHC RBC AUTO-ENTMCNC: 33.1 G/DL (ref 32.6–36.4)
MCV RBC AUTO: 96.4 FL (ref 79.8–96.2)
MONOCYTES # BLD AUTO: 0.78 10*3/MM3 (ref 0.2–1.2)
MONOCYTES NFR BLD AUTO: 5.4 % (ref 5–12)
NEUTROPHILS # BLD AUTO: 11.55 10*3/MM3 (ref 1.9–8.1)
NEUTROPHILS NFR BLD AUTO: 80.5 % (ref 42.7–76)
NITRITE UR QL STRIP: NEGATIVE
PH UR STRIP.AUTO: 7 [PH] (ref 5–8)
PLATELET # BLD AUTO: 205 10*3/MM3 (ref 140–500)
PMV BLD AUTO: 10 FL (ref 6–12)
POTASSIUM BLD-SCNC: 4 MMOL/L (ref 3.5–5.2)
PROT SERPL-MCNC: 6.4 G/DL (ref 6–8.5)
PROT UR QL STRIP: ABNORMAL
PROTHROMBIN TIME: 14.4 SECONDS (ref 11.7–14.2)
RBC # BLD AUTO: 4.2 10*6/MM3 (ref 4.6–6)
SODIUM BLD-SCNC: 138 MMOL/L (ref 136–145)
SP GR UR STRIP: 1.02 (ref 1–1.03)
UROBILINOGEN UR QL STRIP: ABNORMAL
WBC NRBC COR # BLD: 14.35 10*3/MM3 (ref 4.5–10.7)

## 2018-01-16 PROCEDURE — 85610 PROTHROMBIN TIME: CPT | Performed by: PHYSICIAN ASSISTANT

## 2018-01-16 PROCEDURE — 80053 COMPREHEN METABOLIC PANEL: CPT | Performed by: PHYSICIAN ASSISTANT

## 2018-01-16 PROCEDURE — 99223 1ST HOSP IP/OBS HIGH 75: CPT | Performed by: ORTHOPAEDIC SURGERY

## 2018-01-16 PROCEDURE — 93005 ELECTROCARDIOGRAM TRACING: CPT | Performed by: PHYSICIAN ASSISTANT

## 2018-01-16 PROCEDURE — 94799 UNLISTED PULMONARY SVC/PX: CPT

## 2018-01-16 PROCEDURE — 85025 COMPLETE CBC W/AUTO DIFF WBC: CPT | Performed by: PHYSICIAN ASSISTANT

## 2018-01-16 PROCEDURE — 99284 EMERGENCY DEPT VISIT MOD MDM: CPT

## 2018-01-16 PROCEDURE — 25010000002 ONDANSETRON PER 1 MG: Performed by: EMERGENCY MEDICINE

## 2018-01-16 PROCEDURE — 25010000002 HYDROMORPHONE PER 4 MG: Performed by: EMERGENCY MEDICINE

## 2018-01-16 PROCEDURE — 71045 X-RAY EXAM CHEST 1 VIEW: CPT

## 2018-01-16 PROCEDURE — 94640 AIRWAY INHALATION TREATMENT: CPT

## 2018-01-16 PROCEDURE — 93010 ELECTROCARDIOGRAM REPORT: CPT | Performed by: INTERNAL MEDICINE

## 2018-01-16 PROCEDURE — 70450 CT HEAD/BRAIN W/O DYE: CPT

## 2018-01-16 PROCEDURE — 73502 X-RAY EXAM HIP UNI 2-3 VIEWS: CPT

## 2018-01-16 PROCEDURE — 81003 URINALYSIS AUTO W/O SCOPE: CPT | Performed by: INTERNAL MEDICINE

## 2018-01-16 RX ORDER — HYDROCODONE BITARTRATE AND ACETAMINOPHEN 5; 325 MG/1; MG/1
1 TABLET ORAL EVERY 4 HOURS PRN
Status: DISCONTINUED | OUTPATIENT
Start: 2018-01-16 | End: 2018-01-21

## 2018-01-16 RX ORDER — ACETAMINOPHEN 325 MG/1
650 TABLET ORAL EVERY 4 HOURS PRN
Status: DISCONTINUED | OUTPATIENT
Start: 2018-01-16 | End: 2018-02-01 | Stop reason: HOSPADM

## 2018-01-16 RX ORDER — HYDROCODONE BITARTRATE AND ACETAMINOPHEN 7.5; 325 MG/1; MG/1
1 TABLET ORAL ONCE
Status: COMPLETED | OUTPATIENT
Start: 2018-01-16 | End: 2018-01-16

## 2018-01-16 RX ORDER — SODIUM CHLORIDE 9 MG/ML
75 INJECTION, SOLUTION INTRAVENOUS CONTINUOUS
Status: DISCONTINUED | OUTPATIENT
Start: 2018-01-16 | End: 2018-01-20

## 2018-01-16 RX ORDER — HYDROMORPHONE HYDROCHLORIDE 1 MG/ML
0.5 INJECTION, SOLUTION INTRAMUSCULAR; INTRAVENOUS; SUBCUTANEOUS
Status: DISCONTINUED | OUTPATIENT
Start: 2018-01-16 | End: 2018-01-24

## 2018-01-16 RX ORDER — IPRATROPIUM BROMIDE AND ALBUTEROL SULFATE 2.5; .5 MG/3ML; MG/3ML
3 SOLUTION RESPIRATORY (INHALATION)
Status: DISCONTINUED | OUTPATIENT
Start: 2018-01-16 | End: 2018-01-18

## 2018-01-16 RX ORDER — CALCIUM CARBONATE 200(500)MG
2 TABLET,CHEWABLE ORAL 2 TIMES DAILY PRN
Status: DISCONTINUED | OUTPATIENT
Start: 2018-01-16 | End: 2018-02-01 | Stop reason: HOSPADM

## 2018-01-16 RX ORDER — METHYLPHENIDATE HYDROCHLORIDE 5 MG/1
10 TABLET ORAL DAILY
Status: DISCONTINUED | OUTPATIENT
Start: 2018-01-16 | End: 2018-02-01 | Stop reason: HOSPADM

## 2018-01-16 RX ORDER — ONDANSETRON 4 MG/1
4 TABLET, ORALLY DISINTEGRATING ORAL EVERY 6 HOURS PRN
Status: DISCONTINUED | OUTPATIENT
Start: 2018-01-16 | End: 2018-02-01 | Stop reason: HOSPADM

## 2018-01-16 RX ORDER — ONDANSETRON 2 MG/ML
4 INJECTION INTRAMUSCULAR; INTRAVENOUS ONCE
Status: COMPLETED | OUTPATIENT
Start: 2018-01-16 | End: 2018-01-16

## 2018-01-16 RX ORDER — HYDROMORPHONE HYDROCHLORIDE 1 MG/ML
0.5 INJECTION, SOLUTION INTRAMUSCULAR; INTRAVENOUS; SUBCUTANEOUS ONCE
Status: COMPLETED | OUTPATIENT
Start: 2018-01-16 | End: 2018-01-16

## 2018-01-16 RX ORDER — SODIUM CHLORIDE 0.9 % (FLUSH) 0.9 %
1-10 SYRINGE (ML) INJECTION AS NEEDED
Status: DISCONTINUED | OUTPATIENT
Start: 2018-01-16 | End: 2018-02-01 | Stop reason: HOSPADM

## 2018-01-16 RX ORDER — AZITHROMYCIN 250 MG/1
250 TABLET, FILM COATED ORAL EVERY OTHER DAY
Status: DISCONTINUED | OUTPATIENT
Start: 2018-01-16 | End: 2018-01-20

## 2018-01-16 RX ORDER — ONDANSETRON 2 MG/ML
4 INJECTION INTRAMUSCULAR; INTRAVENOUS EVERY 6 HOURS PRN
Status: DISCONTINUED | OUTPATIENT
Start: 2018-01-16 | End: 2018-02-01 | Stop reason: HOSPADM

## 2018-01-16 RX ORDER — SODIUM CHLORIDE 9 MG/ML
125 INJECTION, SOLUTION INTRAVENOUS CONTINUOUS
Status: DISCONTINUED | OUTPATIENT
Start: 2018-01-16 | End: 2018-01-16

## 2018-01-16 RX ORDER — TIZANIDINE 4 MG/1
2 TABLET ORAL EVERY 8 HOURS PRN
Status: DISCONTINUED | OUTPATIENT
Start: 2018-01-16 | End: 2018-02-01 | Stop reason: HOSPADM

## 2018-01-16 RX ORDER — SENNA AND DOCUSATE SODIUM 50; 8.6 MG/1; MG/1
2 TABLET, FILM COATED ORAL NIGHTLY
Status: DISCONTINUED | OUTPATIENT
Start: 2018-01-16 | End: 2018-02-01 | Stop reason: HOSPADM

## 2018-01-16 RX ORDER — ONDANSETRON 4 MG/1
4 TABLET, FILM COATED ORAL EVERY 6 HOURS PRN
Status: DISCONTINUED | OUTPATIENT
Start: 2018-01-16 | End: 2018-02-01 | Stop reason: HOSPADM

## 2018-01-16 RX ORDER — NALOXONE HCL 0.4 MG/ML
0.4 VIAL (ML) INJECTION
Status: DISCONTINUED | OUTPATIENT
Start: 2018-01-16 | End: 2018-01-24

## 2018-01-16 RX ORDER — ROPINIROLE 0.5 MG/1
0.5 TABLET, FILM COATED ORAL NIGHTLY
Status: DISCONTINUED | OUTPATIENT
Start: 2018-01-16 | End: 2018-02-01 | Stop reason: HOSPADM

## 2018-01-16 RX ADMIN — SODIUM CHLORIDE 75 ML/HR: 9 INJECTION, SOLUTION INTRAVENOUS at 20:48

## 2018-01-16 RX ADMIN — HYDROMORPHONE HYDROCHLORIDE 0.5 MG: 1 INJECTION, SOLUTION INTRAMUSCULAR; INTRAVENOUS; SUBCUTANEOUS at 12:33

## 2018-01-16 RX ADMIN — SODIUM CHLORIDE 125 ML/HR: 9 INJECTION, SOLUTION INTRAVENOUS at 12:36

## 2018-01-16 RX ADMIN — AZITHROMYCIN 250 MG: 250 TABLET, FILM COATED ORAL at 17:38

## 2018-01-16 RX ADMIN — HYDROCODONE BITARTRATE AND ACETAMINOPHEN 1 TABLET: 7.5; 325 TABLET ORAL at 10:05

## 2018-01-16 RX ADMIN — ROPINIROLE HYDROCHLORIDE 0.5 MG: 0.5 TABLET, FILM COATED ORAL at 20:47

## 2018-01-16 RX ADMIN — IPRATROPIUM BROMIDE AND ALBUTEROL SULFATE 3 ML: .5; 3 SOLUTION RESPIRATORY (INHALATION) at 21:16

## 2018-01-16 RX ADMIN — HYDROCODONE BITARTRATE AND ACETAMINOPHEN 1 TABLET: 5; 325 TABLET ORAL at 15:12

## 2018-01-16 RX ADMIN — ONDANSETRON 4 MG: 2 INJECTION INTRAMUSCULAR; INTRAVENOUS at 12:32

## 2018-01-16 RX ADMIN — IPRATROPIUM BROMIDE AND ALBUTEROL SULFATE 3 ML: .5; 3 SOLUTION RESPIRATORY (INHALATION) at 15:21

## 2018-01-17 ENCOUNTER — APPOINTMENT (OUTPATIENT)
Dept: GENERAL RADIOLOGY | Facility: HOSPITAL | Age: 78
End: 2018-01-17
Attending: ORTHOPAEDIC SURGERY

## 2018-01-17 ENCOUNTER — ANESTHESIA EVENT (OUTPATIENT)
Dept: PERIOP | Facility: HOSPITAL | Age: 78
End: 2018-01-17

## 2018-01-17 ENCOUNTER — ANESTHESIA (OUTPATIENT)
Dept: PERIOP | Facility: HOSPITAL | Age: 78
End: 2018-01-17

## 2018-01-17 LAB
ANION GAP SERPL CALCULATED.3IONS-SCNC: 5.7 MMOL/L
BASOPHILS # BLD AUTO: 0.05 10*3/MM3 (ref 0–0.2)
BASOPHILS NFR BLD AUTO: 0.5 % (ref 0–1.5)
BUN BLD-MCNC: 11 MG/DL (ref 8–23)
BUN/CREAT SERPL: 13.1 (ref 7–25)
CALCIUM SPEC-SCNC: 8 MG/DL (ref 8.6–10.5)
CHLORIDE SERPL-SCNC: 99 MMOL/L (ref 98–107)
CO2 SERPL-SCNC: 28.3 MMOL/L (ref 22–29)
CREAT BLD-MCNC: 0.84 MG/DL (ref 0.76–1.27)
DEPRECATED RDW RBC AUTO: 48 FL (ref 37–54)
EOSINOPHIL # BLD AUTO: 1.55 10*3/MM3 (ref 0–0.7)
EOSINOPHIL NFR BLD AUTO: 17 % (ref 0.3–6.2)
ERYTHROCYTE [DISTWIDTH] IN BLOOD BY AUTOMATED COUNT: 13.2 % (ref 11.5–14.5)
GFR SERPL CREATININE-BSD FRML MDRD: 88 ML/MIN/1.73
GLUCOSE BLD-MCNC: 99 MG/DL (ref 65–99)
HCT VFR BLD AUTO: 39.9 % (ref 40.4–52.2)
HGB BLD-MCNC: 12.8 G/DL (ref 13.7–17.6)
IMM GRANULOCYTES # BLD: 0 10*3/MM3 (ref 0–0.03)
IMM GRANULOCYTES NFR BLD: 0 % (ref 0–0.5)
LYMPHOCYTES # BLD AUTO: 1.26 10*3/MM3 (ref 0.9–4.8)
LYMPHOCYTES NFR BLD AUTO: 13.8 % (ref 19.6–45.3)
MCH RBC QN AUTO: 31.7 PG (ref 27–32.7)
MCHC RBC AUTO-ENTMCNC: 32.1 G/DL (ref 32.6–36.4)
MCV RBC AUTO: 98.8 FL (ref 79.8–96.2)
MONOCYTES # BLD AUTO: 0.76 10*3/MM3 (ref 0.2–1.2)
MONOCYTES NFR BLD AUTO: 8.3 % (ref 5–12)
NEUTROPHILS # BLD AUTO: 5.52 10*3/MM3 (ref 1.9–8.1)
NEUTROPHILS NFR BLD AUTO: 60.4 % (ref 42.7–76)
PLATELET # BLD AUTO: 183 10*3/MM3 (ref 140–500)
PMV BLD AUTO: 10.1 FL (ref 6–12)
POTASSIUM BLD-SCNC: 4 MMOL/L (ref 3.5–5.2)
RBC # BLD AUTO: 4.04 10*6/MM3 (ref 4.6–6)
SODIUM BLD-SCNC: 133 MMOL/L (ref 136–145)
WBC NRBC COR # BLD: 9.14 10*3/MM3 (ref 4.5–10.7)

## 2018-01-17 PROCEDURE — 85025 COMPLETE CBC W/AUTO DIFF WBC: CPT | Performed by: INTERNAL MEDICINE

## 2018-01-17 PROCEDURE — 27245 TREAT THIGH FRACTURE: CPT | Performed by: ORTHOPAEDIC SURGERY

## 2018-01-17 PROCEDURE — 25010000002 FENTANYL CITRATE (PF) 100 MCG/2ML SOLUTION: Performed by: ANESTHESIOLOGY

## 2018-01-17 PROCEDURE — C1713 ANCHOR/SCREW BN/BN,TIS/BN: HCPCS | Performed by: ORTHOPAEDIC SURGERY

## 2018-01-17 PROCEDURE — 25010000002 CEFAZOLIN PER 500 MG: Performed by: ORTHOPAEDIC SURGERY

## 2018-01-17 PROCEDURE — 25010000002 ROPIVACAINE PER 1 MG: Performed by: ORTHOPAEDIC SURGERY

## 2018-01-17 PROCEDURE — 0QS636Z REPOSITION RIGHT UPPER FEMUR WITH INTRAMEDULLARY INTERNAL FIXATION DEVICE, PERCUTANEOUS APPROACH: ICD-10-PCS | Performed by: ORTHOPAEDIC SURGERY

## 2018-01-17 PROCEDURE — 25010000002 HYDROMORPHONE PER 4 MG: Performed by: INTERNAL MEDICINE

## 2018-01-17 PROCEDURE — 27245 TREAT THIGH FRACTURE: CPT | Performed by: NURSE PRACTITIONER

## 2018-01-17 PROCEDURE — 80048 BASIC METABOLIC PNL TOTAL CA: CPT | Performed by: INTERNAL MEDICINE

## 2018-01-17 PROCEDURE — 25010000002 FENTANYL CITRATE (PF) 100 MCG/2ML SOLUTION: Performed by: NURSE ANESTHETIST, CERTIFIED REGISTERED

## 2018-01-17 PROCEDURE — 25010000002 PROPOFOL 10 MG/ML EMULSION: Performed by: NURSE ANESTHETIST, CERTIFIED REGISTERED

## 2018-01-17 PROCEDURE — 73502 X-RAY EXAM HIP UNI 2-3 VIEWS: CPT

## 2018-01-17 PROCEDURE — 94799 UNLISTED PULMONARY SVC/PX: CPT

## 2018-01-17 PROCEDURE — 25010000002 KETOROLAC TROMETHAMINE PER 15 MG: Performed by: ORTHOPAEDIC SURGERY

## 2018-01-17 PROCEDURE — 93010 ELECTROCARDIOGRAM REPORT: CPT | Performed by: INTERNAL MEDICINE

## 2018-01-17 PROCEDURE — 25010000002 MORPHINE (PF) 10 MG/ML SOLUTION 1 ML VIAL: Performed by: ORTHOPAEDIC SURGERY

## 2018-01-17 PROCEDURE — 25010000002 PHENYLEPHRINE PER 1 ML: Performed by: NURSE ANESTHETIST, CERTIFIED REGISTERED

## 2018-01-17 PROCEDURE — 25010000002 ONDANSETRON PER 1 MG: Performed by: INTERNAL MEDICINE

## 2018-01-17 PROCEDURE — 93005 ELECTROCARDIOGRAM TRACING: CPT | Performed by: INTERNAL MEDICINE

## 2018-01-17 PROCEDURE — 25010000002 EPINEPHRINE PER 0.1 MG: Performed by: ORTHOPAEDIC SURGERY

## 2018-01-17 DEVICE — IMPLANTABLE DEVICE
Type: IMPLANTABLE DEVICE | Site: HIP | Status: NON-FUNCTIONAL
Removed: 2019-09-23

## 2018-01-17 DEVICE — SCRW CANN TFN ADV TI 10.35X95MM STRL
Type: IMPLANTABLE DEVICE | Site: HIP | Status: NON-FUNCTIONAL
Removed: 2019-09-23

## 2018-01-17 RX ORDER — MIDAZOLAM HYDROCHLORIDE 1 MG/ML
1 INJECTION INTRAMUSCULAR; INTRAVENOUS
Status: DISCONTINUED | OUTPATIENT
Start: 2018-01-17 | End: 2018-01-17 | Stop reason: HOSPADM

## 2018-01-17 RX ORDER — FENTANYL CITRATE 50 UG/ML
INJECTION, SOLUTION INTRAMUSCULAR; INTRAVENOUS AS NEEDED
Status: DISCONTINUED | OUTPATIENT
Start: 2018-01-17 | End: 2018-01-17 | Stop reason: SURG

## 2018-01-17 RX ORDER — SODIUM CHLORIDE, SODIUM LACTATE, POTASSIUM CHLORIDE, CALCIUM CHLORIDE 600; 310; 30; 20 MG/100ML; MG/100ML; MG/100ML; MG/100ML
9 INJECTION, SOLUTION INTRAVENOUS CONTINUOUS
Status: DISCONTINUED | OUTPATIENT
Start: 2018-01-17 | End: 2018-01-20

## 2018-01-17 RX ORDER — PROMETHAZINE HYDROCHLORIDE 25 MG/ML
12.5 INJECTION, SOLUTION INTRAMUSCULAR; INTRAVENOUS ONCE AS NEEDED
Status: DISCONTINUED | OUTPATIENT
Start: 2018-01-17 | End: 2018-01-17 | Stop reason: HOSPADM

## 2018-01-17 RX ORDER — PROPOFOL 10 MG/ML
VIAL (ML) INTRAVENOUS AS NEEDED
Status: DISCONTINUED | OUTPATIENT
Start: 2018-01-17 | End: 2018-01-17 | Stop reason: SURG

## 2018-01-17 RX ORDER — FAMOTIDINE 10 MG/ML
20 INJECTION, SOLUTION INTRAVENOUS ONCE
Status: COMPLETED | OUTPATIENT
Start: 2018-01-17 | End: 2018-01-17

## 2018-01-17 RX ORDER — ONDANSETRON 2 MG/ML
4 INJECTION INTRAMUSCULAR; INTRAVENOUS ONCE AS NEEDED
Status: DISCONTINUED | OUTPATIENT
Start: 2018-01-17 | End: 2018-01-17 | Stop reason: HOSPADM

## 2018-01-17 RX ORDER — DIPHENHYDRAMINE HYDROCHLORIDE 50 MG/ML
12.5 INJECTION INTRAMUSCULAR; INTRAVENOUS
Status: DISCONTINUED | OUTPATIENT
Start: 2018-01-17 | End: 2018-01-17 | Stop reason: HOSPADM

## 2018-01-17 RX ORDER — PROMETHAZINE HYDROCHLORIDE 25 MG/1
12.5 TABLET ORAL ONCE AS NEEDED
Status: DISCONTINUED | OUTPATIENT
Start: 2018-01-17 | End: 2018-01-17 | Stop reason: HOSPADM

## 2018-01-17 RX ORDER — SODIUM CHLORIDE 0.9 % (FLUSH) 0.9 %
1-10 SYRINGE (ML) INJECTION AS NEEDED
Status: DISCONTINUED | OUTPATIENT
Start: 2018-01-17 | End: 2018-01-17 | Stop reason: HOSPADM

## 2018-01-17 RX ORDER — TRANEXAMIC ACID 100 MG/ML
INJECTION, SOLUTION INTRAVENOUS AS NEEDED
Status: DISCONTINUED | OUTPATIENT
Start: 2018-01-17 | End: 2018-01-17 | Stop reason: HOSPADM

## 2018-01-17 RX ORDER — FLUMAZENIL 0.1 MG/ML
0.2 INJECTION INTRAVENOUS AS NEEDED
Status: DISCONTINUED | OUTPATIENT
Start: 2018-01-17 | End: 2018-01-17 | Stop reason: HOSPADM

## 2018-01-17 RX ORDER — LABETALOL HYDROCHLORIDE 5 MG/ML
5 INJECTION, SOLUTION INTRAVENOUS
Status: DISCONTINUED | OUTPATIENT
Start: 2018-01-17 | End: 2018-01-17 | Stop reason: HOSPADM

## 2018-01-17 RX ORDER — FENTANYL CITRATE 50 UG/ML
50 INJECTION, SOLUTION INTRAMUSCULAR; INTRAVENOUS
Status: DISCONTINUED | OUTPATIENT
Start: 2018-01-17 | End: 2018-01-17 | Stop reason: HOSPADM

## 2018-01-17 RX ORDER — OXYCODONE AND ACETAMINOPHEN 7.5; 325 MG/1; MG/1
1 TABLET ORAL ONCE AS NEEDED
Status: DISCONTINUED | OUTPATIENT
Start: 2018-01-17 | End: 2018-01-17 | Stop reason: HOSPADM

## 2018-01-17 RX ORDER — MIDAZOLAM HYDROCHLORIDE 1 MG/ML
2 INJECTION INTRAMUSCULAR; INTRAVENOUS
Status: DISCONTINUED | OUTPATIENT
Start: 2018-01-17 | End: 2018-01-17 | Stop reason: HOSPADM

## 2018-01-17 RX ORDER — HYDRALAZINE HYDROCHLORIDE 20 MG/ML
5 INJECTION INTRAMUSCULAR; INTRAVENOUS
Status: DISCONTINUED | OUTPATIENT
Start: 2018-01-17 | End: 2018-01-17 | Stop reason: HOSPADM

## 2018-01-17 RX ORDER — PROMETHAZINE HYDROCHLORIDE 25 MG/1
25 TABLET ORAL ONCE AS NEEDED
Status: DISCONTINUED | OUTPATIENT
Start: 2018-01-17 | End: 2018-01-17 | Stop reason: HOSPADM

## 2018-01-17 RX ORDER — NALOXONE HCL 0.4 MG/ML
0.2 VIAL (ML) INJECTION AS NEEDED
Status: DISCONTINUED | OUTPATIENT
Start: 2018-01-17 | End: 2018-01-17 | Stop reason: HOSPADM

## 2018-01-17 RX ORDER — HYDROMORPHONE HCL 110MG/55ML
0.5 PATIENT CONTROLLED ANALGESIA SYRINGE INTRAVENOUS
Status: DISCONTINUED | OUTPATIENT
Start: 2018-01-17 | End: 2018-01-17 | Stop reason: HOSPADM

## 2018-01-17 RX ORDER — LIDOCAINE HYDROCHLORIDE 20 MG/ML
INJECTION, SOLUTION INFILTRATION; PERINEURAL AS NEEDED
Status: DISCONTINUED | OUTPATIENT
Start: 2018-01-17 | End: 2018-01-17 | Stop reason: SURG

## 2018-01-17 RX ORDER — EPHEDRINE SULFATE 50 MG/ML
5 INJECTION, SOLUTION INTRAVENOUS ONCE AS NEEDED
Status: DISCONTINUED | OUTPATIENT
Start: 2018-01-17 | End: 2018-01-17 | Stop reason: HOSPADM

## 2018-01-17 RX ORDER — PROMETHAZINE HYDROCHLORIDE 25 MG/1
25 SUPPOSITORY RECTAL ONCE AS NEEDED
Status: DISCONTINUED | OUTPATIENT
Start: 2018-01-17 | End: 2018-01-17 | Stop reason: HOSPADM

## 2018-01-17 RX ORDER — HYDROCODONE BITARTRATE AND ACETAMINOPHEN 7.5; 325 MG/1; MG/1
1 TABLET ORAL ONCE AS NEEDED
Status: DISCONTINUED | OUTPATIENT
Start: 2018-01-17 | End: 2018-01-17 | Stop reason: HOSPADM

## 2018-01-17 RX ORDER — EPHEDRINE SULFATE 50 MG/ML
INJECTION, SOLUTION INTRAVENOUS AS NEEDED
Status: DISCONTINUED | OUTPATIENT
Start: 2018-01-17 | End: 2018-01-17 | Stop reason: SURG

## 2018-01-17 RX ORDER — LIDOCAINE HYDROCHLORIDE 10 MG/ML
0.5 INJECTION, SOLUTION EPIDURAL; INFILTRATION; INTRACAUDAL; PERINEURAL ONCE AS NEEDED
Status: DISCONTINUED | OUTPATIENT
Start: 2018-01-17 | End: 2018-01-17 | Stop reason: HOSPADM

## 2018-01-17 RX ORDER — IPRATROPIUM BROMIDE AND ALBUTEROL SULFATE 2.5; .5 MG/3ML; MG/3ML
3 SOLUTION RESPIRATORY (INHALATION)
Status: DISCONTINUED | OUTPATIENT
Start: 2018-01-17 | End: 2018-01-18

## 2018-01-17 RX ADMIN — SODIUM CHLORIDE, POTASSIUM CHLORIDE, SODIUM LACTATE AND CALCIUM CHLORIDE: 600; 310; 30; 20 INJECTION, SOLUTION INTRAVENOUS at 12:43

## 2018-01-17 RX ADMIN — RIVAROXABAN 10 MG: 10 TABLET, FILM COATED ORAL at 22:54

## 2018-01-17 RX ADMIN — IPRATROPIUM BROMIDE AND ALBUTEROL SULFATE 3 ML: .5; 3 SOLUTION RESPIRATORY (INHALATION) at 11:28

## 2018-01-17 RX ADMIN — EPHEDRINE SULFATE 10 MG: 50 INJECTION INTRAMUSCULAR; INTRAVENOUS; SUBCUTANEOUS at 13:05

## 2018-01-17 RX ADMIN — PROPOFOL 150 MG: 10 INJECTION, EMULSION INTRAVENOUS at 12:50

## 2018-01-17 RX ADMIN — HYDROCODONE BITARTRATE AND ACETAMINOPHEN 1 TABLET: 5; 325 TABLET ORAL at 01:15

## 2018-01-17 RX ADMIN — IPRATROPIUM BROMIDE AND ALBUTEROL SULFATE 3 ML: .5; 3 SOLUTION RESPIRATORY (INHALATION) at 07:41

## 2018-01-17 RX ADMIN — IPRATROPIUM BROMIDE AND ALBUTEROL SULFATE 3 ML: .5; 3 SOLUTION RESPIRATORY (INHALATION) at 21:15

## 2018-01-17 RX ADMIN — FAMOTIDINE 20 MG: 10 INJECTION, SOLUTION INTRAVENOUS at 11:47

## 2018-01-17 RX ADMIN — ONDANSETRON HYDROCHLORIDE 4 MG: 2 SOLUTION INTRAMUSCULAR; INTRAVENOUS at 13:00

## 2018-01-17 RX ADMIN — SODIUM CHLORIDE, POTASSIUM CHLORIDE, SODIUM LACTATE AND CALCIUM CHLORIDE 9 ML/HR: 600; 310; 30; 20 INJECTION, SOLUTION INTRAVENOUS at 14:53

## 2018-01-17 RX ADMIN — HYDROMORPHONE HYDROCHLORIDE 0.5 MG: 1 INJECTION, SOLUTION INTRAMUSCULAR; INTRAVENOUS; SUBCUTANEOUS at 21:25

## 2018-01-17 RX ADMIN — CEFAZOLIN SODIUM 2 G: 10 INJECTION, POWDER, FOR SOLUTION INTRAVENOUS at 22:54

## 2018-01-17 RX ADMIN — FENTANYL CITRATE 50 MCG: 50 INJECTION INTRAMUSCULAR; INTRAVENOUS at 13:45

## 2018-01-17 RX ADMIN — FENTANYL CITRATE 50 MCG: 50 INJECTION INTRAMUSCULAR; INTRAVENOUS at 13:20

## 2018-01-17 RX ADMIN — LIDOCAINE HYDROCHLORIDE 60 MG: 20 INJECTION, SOLUTION INFILTRATION; PERINEURAL at 12:50

## 2018-01-17 RX ADMIN — HYDROMORPHONE HYDROCHLORIDE 0.5 MG: 1 INJECTION, SOLUTION INTRAMUSCULAR; INTRAVENOUS; SUBCUTANEOUS at 10:00

## 2018-01-17 RX ADMIN — SODIUM CHLORIDE 75 ML/HR: 9 INJECTION, SOLUTION INTRAVENOUS at 21:30

## 2018-01-17 RX ADMIN — FENTANYL CITRATE 25 MCG: 50 INJECTION INTRAMUSCULAR; INTRAVENOUS at 11:50

## 2018-01-17 RX ADMIN — PHENYLEPHRINE HYDROCHLORIDE 100 MCG: 10 INJECTION INTRAVENOUS at 13:00

## 2018-01-17 RX ADMIN — DOCUSATE SODIUM -SENNOSIDES 2 TABLET: 50; 8.6 TABLET, COATED ORAL at 21:24

## 2018-01-17 RX ADMIN — IPRATROPIUM BROMIDE AND ALBUTEROL SULFATE 3 ML: .5; 3 SOLUTION RESPIRATORY (INHALATION) at 17:15

## 2018-01-17 RX ADMIN — CEFAZOLIN SODIUM 2 G: 1 INJECTION, POWDER, FOR SOLUTION INTRAMUSCULAR; INTRAVENOUS at 12:48

## 2018-01-17 RX ADMIN — ROPINIROLE HYDROCHLORIDE 0.5 MG: 0.5 TABLET, FILM COATED ORAL at 21:24

## 2018-01-17 RX ADMIN — PHENYLEPHRINE HYDROCHLORIDE 100 MCG: 10 INJECTION INTRAVENOUS at 13:05

## 2018-01-17 RX ADMIN — SODIUM CHLORIDE, POTASSIUM CHLORIDE, SODIUM LACTATE AND CALCIUM CHLORIDE 9 ML/HR: 600; 310; 30; 20 INJECTION, SOLUTION INTRAVENOUS at 11:48

## 2018-01-17 NOTE — ANESTHESIA PREPROCEDURE EVALUATION
Anesthesia Evaluation     Patient summary reviewed and Nursing notes reviewed   NPO Solid Status: > 8 hours  NPO Liquid Status: > 8 hours     Airway   Mallampati: I  TM distance: <3 FB  Neck ROM: full  no difficulty expected  Dental - normal exam   (+) poor dentition    Pulmonary - normal exam    breath sounds clear to auscultation  (+) COPD,   (-) shortness of breath, sleep apnea, decreased breath sounds, wheezes  Cardiovascular - negative cardio ROS and normal exam  Exercise tolerance: good (4-7 METS)    Rhythm: regular  Rate: normal    (-) CAD, angina, CHF, orthopnea, PND, RODRIGUEZ, PVD      Neuro/Psych- negative ROS  (-) seizures, neuromuscular disease, TIA, CVA, headaches, syncope, weakness, numbness  GI/Hepatic/Renal/Endo    (+)  GERD,   (-) liver disease, diabetes    Musculoskeletal     Abdominal  - normal exam    Bowel sounds: normal.   Substance History - negative use  (-) alcohol use, drug use     OB/GYN negative ob/gyn ROS         Other   (+) arthritis     ROS/Med Hx Other: Patient has known COPD. CXR yesterday relatively normal. Pre op Duo Neb ordered and given.                                         Anesthesia Plan    ASA 3     MAC     Anesthetic plan and risks discussed with patient.

## 2018-01-17 NOTE — ANESTHESIA POSTPROCEDURE EVALUATION
Patient: Tony Leonard    Procedure Summary     Date Anesthesia Start Anesthesia Stop Room / Location    01/17/18 1243 1425  [facility] /  [facility]       Procedure Diagnosis Surgeon Provider    HIP OPEN REDUCTION INTERNAL FIXATION WITH DYNAMIC HIP SCREW (Right Hip) Fracture, intertrochanteric, right femur, closed, initial encounter  (Fracture, intertrochanteric, right femur, closed, initial encounter [S72.141A]) MD Juliano Woody MD          Anesthesia Type: MAC  Last vitals  BP   116/63 (01/17/18 1615)   Temp   36.3 °C (97.4 °F) (01/17/18 1422)   Pulse   85 (01/17/18 1615)   Resp   20 (01/17/18 1615)     SpO2   95 % (01/17/18 1615)     Post Anesthesia Care and Evaluation    Patient location during evaluation: bedside  Patient participation: complete - patient participated  Level of consciousness: awake  Pain score: 1  Pain management: adequate  Airway patency: patent  Anesthetic complications: No anesthetic complications    Cardiovascular status: acceptable  Respiratory status: acceptable  Hydration status: acceptable    Comments: --------------------            01/17/18 1615     --------------------   BP:       116/63     Pulse:      85       Resp:       20       Temp:                SpO2:      95%      --------------------

## 2018-01-17 NOTE — ANESTHESIA PROCEDURE NOTES
Airway  Urgency: elective    Date/Time: 1/17/2018 12:52 PM  Airway not difficult    General Information and Staff    Patient location during procedure: OR  Anesthesiologist: IMELDA TRINIDAD  CRNA: ALEX ZAMORA    Indications and Patient Condition  Indications for airway management: airway protection    Preoxygenated: yes  MILS not maintained throughout  Mask difficulty assessment: 1 - vent by mask    Final Airway Details  Final airway type: supraglottic airway      Successful airway: classic  Size 5    Number of attempts at approach: 1    Additional Comments  Preoxygenation FEO2 >85, SIVI, LMA placed with ease, teeth/lips as preop. Secured and placement confirmed.

## 2018-01-18 ENCOUNTER — APPOINTMENT (OUTPATIENT)
Dept: GENERAL RADIOLOGY | Facility: HOSPITAL | Age: 78
End: 2018-01-18
Attending: HOSPITALIST

## 2018-01-18 PROBLEM — J96.21 ACUTE ON CHRONIC RESPIRATORY FAILURE WITH HYPOXIA: Status: ACTIVE | Noted: 2017-04-27

## 2018-01-18 LAB
ANION GAP SERPL CALCULATED.3IONS-SCNC: 13.1 MMOL/L
ARTERIAL PATENCY WRIST A: ABNORMAL
ATMOSPHERIC PRESS: 756.6 MMHG
BASE EXCESS BLDA CALC-SCNC: 1.4 MMOL/L (ref 0–2)
BASOPHILS # BLD AUTO: 0.06 10*3/MM3 (ref 0–0.2)
BASOPHILS NFR BLD AUTO: 0.5 % (ref 0–1.5)
BDY SITE: ABNORMAL
BUN BLD-MCNC: 9 MG/DL (ref 8–23)
BUN/CREAT SERPL: 11.1 (ref 7–25)
CALCIUM SPEC-SCNC: 8.3 MG/DL (ref 8.6–10.5)
CHLORIDE SERPL-SCNC: 98 MMOL/L (ref 98–107)
CO2 SERPL-SCNC: 22.9 MMOL/L (ref 22–29)
CREAT BLD-MCNC: 0.81 MG/DL (ref 0.76–1.27)
DEPRECATED RDW RBC AUTO: 47.8 FL (ref 37–54)
EOSINOPHIL # BLD AUTO: 0.98 10*3/MM3 (ref 0–0.7)
EOSINOPHIL NFR BLD AUTO: 8.9 % (ref 0.3–6.2)
ERYTHROCYTE [DISTWIDTH] IN BLOOD BY AUTOMATED COUNT: 13.3 % (ref 11.5–14.5)
GAS FLOW AIRWAY: 10 LPM
GFR SERPL CREATININE-BSD FRML MDRD: 92 ML/MIN/1.73
GLUCOSE BLD-MCNC: 113 MG/DL (ref 65–99)
HCO3 BLDA-SCNC: 25.5 MMOL/L (ref 22–28)
HCT VFR BLD AUTO: 42.3 % (ref 40.4–52.2)
HGB BLD-MCNC: 13.5 G/DL (ref 13.7–17.6)
IMM GRANULOCYTES # BLD: 0.02 10*3/MM3 (ref 0–0.03)
IMM GRANULOCYTES NFR BLD: 0.2 % (ref 0–0.5)
LYMPHOCYTES # BLD AUTO: 0.93 10*3/MM3 (ref 0.9–4.8)
LYMPHOCYTES NFR BLD AUTO: 8.5 % (ref 19.6–45.3)
MCH RBC QN AUTO: 31.5 PG (ref 27–32.7)
MCHC RBC AUTO-ENTMCNC: 31.9 G/DL (ref 32.6–36.4)
MCV RBC AUTO: 98.6 FL (ref 79.8–96.2)
MODALITY: ABNORMAL
MONOCYTES # BLD AUTO: 0.99 10*3/MM3 (ref 0.2–1.2)
MONOCYTES NFR BLD AUTO: 9 % (ref 5–12)
NEUTROPHILS # BLD AUTO: 8.02 10*3/MM3 (ref 1.9–8.1)
NEUTROPHILS NFR BLD AUTO: 72.9 % (ref 42.7–76)
PCO2 BLDA: 37.8 MM HG (ref 35–45)
PH BLDA: 7.44 PH UNITS (ref 7.35–7.45)
PLATELET # BLD AUTO: 160 10*3/MM3 (ref 140–500)
PMV BLD AUTO: 9.9 FL (ref 6–12)
PO2 BLDA: 62.6 MM HG (ref 80–100)
POTASSIUM BLD-SCNC: 4.5 MMOL/L (ref 3.5–5.2)
RBC # BLD AUTO: 4.29 10*6/MM3 (ref 4.6–6)
SAO2 % BLDCOA: 92.4 % (ref 92–99)
SODIUM BLD-SCNC: 134 MMOL/L (ref 136–145)
TOTAL RATE: 20 BREATHS/MINUTE
WBC NRBC COR # BLD: 11 10*3/MM3 (ref 4.5–10.7)

## 2018-01-18 PROCEDURE — 82803 BLOOD GASES ANY COMBINATION: CPT

## 2018-01-18 PROCEDURE — 94799 UNLISTED PULMONARY SVC/PX: CPT

## 2018-01-18 PROCEDURE — 36415 COLL VENOUS BLD VENIPUNCTURE: CPT | Performed by: INTERNAL MEDICINE

## 2018-01-18 PROCEDURE — 71045 X-RAY EXAM CHEST 1 VIEW: CPT

## 2018-01-18 PROCEDURE — 99024 POSTOP FOLLOW-UP VISIT: CPT | Performed by: ORTHOPAEDIC SURGERY

## 2018-01-18 PROCEDURE — 97161 PT EVAL LOW COMPLEX 20 MIN: CPT

## 2018-01-18 PROCEDURE — 36600 WITHDRAWAL OF ARTERIAL BLOOD: CPT

## 2018-01-18 PROCEDURE — 80048 BASIC METABOLIC PNL TOTAL CA: CPT | Performed by: INTERNAL MEDICINE

## 2018-01-18 PROCEDURE — 85025 COMPLETE CBC W/AUTO DIFF WBC: CPT | Performed by: INTERNAL MEDICINE

## 2018-01-18 RX ORDER — IPRATROPIUM BROMIDE AND ALBUTEROL SULFATE 2.5; .5 MG/3ML; MG/3ML
3 SOLUTION RESPIRATORY (INHALATION)
Status: DISCONTINUED | OUTPATIENT
Start: 2018-01-18 | End: 2018-01-30

## 2018-01-18 RX ORDER — IPRATROPIUM BROMIDE AND ALBUTEROL SULFATE 2.5; .5 MG/3ML; MG/3ML
3 SOLUTION RESPIRATORY (INHALATION)
Status: DISCONTINUED | OUTPATIENT
Start: 2018-01-18 | End: 2018-01-18

## 2018-01-18 RX ORDER — SODIUM CHLORIDE FOR INHALATION 3 %
4 VIAL, NEBULIZER (ML) INHALATION
Status: DISCONTINUED | OUTPATIENT
Start: 2018-01-18 | End: 2018-01-18

## 2018-01-18 RX ORDER — TAMSULOSIN HYDROCHLORIDE 0.4 MG/1
0.4 CAPSULE ORAL DAILY
Status: DISCONTINUED | OUTPATIENT
Start: 2018-01-18 | End: 2018-01-25

## 2018-01-18 RX ORDER — SODIUM CHLORIDE FOR INHALATION 7 %
4 VIAL, NEBULIZER (ML) INHALATION
Status: DISCONTINUED | OUTPATIENT
Start: 2018-01-18 | End: 2018-01-21

## 2018-01-18 RX ADMIN — IPRATROPIUM BROMIDE AND ALBUTEROL SULFATE 3 ML: .5; 3 SOLUTION RESPIRATORY (INHALATION) at 21:22

## 2018-01-18 RX ADMIN — IPRATROPIUM BROMIDE AND ALBUTEROL SULFATE 3 ML: .5; 3 SOLUTION RESPIRATORY (INHALATION) at 07:17

## 2018-01-18 RX ADMIN — AZITHROMYCIN 250 MG: 250 TABLET, FILM COATED ORAL at 08:17

## 2018-01-18 RX ADMIN — IPRATROPIUM BROMIDE AND ALBUTEROL SULFATE 3 ML: .5; 3 SOLUTION RESPIRATORY (INHALATION) at 15:24

## 2018-01-18 RX ADMIN — RIVAROXABAN 10 MG: 10 TABLET, FILM COATED ORAL at 22:40

## 2018-01-18 RX ADMIN — MAGNESIUM HYDROXIDE 10 ML: 2400 SUSPENSION ORAL at 14:23

## 2018-01-18 RX ADMIN — DOCUSATE SODIUM -SENNOSIDES 2 TABLET: 50; 8.6 TABLET, COATED ORAL at 21:02

## 2018-01-18 RX ADMIN — ROPINIROLE HYDROCHLORIDE 0.5 MG: 0.5 TABLET, FILM COATED ORAL at 21:02

## 2018-01-18 RX ADMIN — HYDROCODONE BITARTRATE AND ACETAMINOPHEN 1 TABLET: 5; 325 TABLET ORAL at 08:17

## 2018-01-18 RX ADMIN — SODIUM CHLORIDE 75 ML/HR: 9 INJECTION, SOLUTION INTRAVENOUS at 11:40

## 2018-01-18 RX ADMIN — TAMSULOSIN HYDROCHLORIDE 0.4 MG: 0.4 CAPSULE ORAL at 12:32

## 2018-01-18 RX ADMIN — CEFAZOLIN SODIUM 2 G: 10 INJECTION, POWDER, FOR SOLUTION INTRAVENOUS at 05:37

## 2018-01-18 RX ADMIN — METHYLPHENIDATE HYDROCHLORIDE 10 MG: 10 TABLET ORAL at 08:17

## 2018-01-18 RX ADMIN — HYDROCODONE BITARTRATE AND ACETAMINOPHEN 1 TABLET: 5; 325 TABLET ORAL at 15:13

## 2018-01-18 RX ADMIN — ACETAMINOPHEN 650 MG: 325 TABLET ORAL at 21:06

## 2018-01-18 RX ADMIN — IPRATROPIUM BROMIDE AND ALBUTEROL SULFATE 3 ML: .5; 3 SOLUTION RESPIRATORY (INHALATION) at 12:22

## 2018-01-19 PROBLEM — D62 POSTOPERATIVE ANEMIA DUE TO ACUTE BLOOD LOSS: Status: ACTIVE | Noted: 2018-01-19

## 2018-01-19 LAB
ANION GAP SERPL CALCULATED.3IONS-SCNC: 8.4 MMOL/L
BASOPHILS # BLD AUTO: 0.03 10*3/MM3 (ref 0–0.2)
BASOPHILS NFR BLD AUTO: 0.3 % (ref 0–1.5)
BUN BLD-MCNC: 12 MG/DL (ref 8–23)
BUN/CREAT SERPL: 16.4 (ref 7–25)
CALCIUM SPEC-SCNC: 7.9 MG/DL (ref 8.6–10.5)
CHLORIDE SERPL-SCNC: 102 MMOL/L (ref 98–107)
CO2 SERPL-SCNC: 24.6 MMOL/L (ref 22–29)
CREAT BLD-MCNC: 0.73 MG/DL (ref 0.76–1.27)
DEPRECATED RDW RBC AUTO: 48.2 FL (ref 37–54)
EOSINOPHIL # BLD AUTO: 1.15 10*3/MM3 (ref 0–0.7)
EOSINOPHIL NFR BLD AUTO: 13.3 % (ref 0.3–6.2)
ERYTHROCYTE [DISTWIDTH] IN BLOOD BY AUTOMATED COUNT: 13.5 % (ref 11.5–14.5)
GFR SERPL CREATININE-BSD FRML MDRD: 104 ML/MIN/1.73
GLUCOSE BLD-MCNC: 115 MG/DL (ref 65–99)
HCT VFR BLD AUTO: 32.5 % (ref 40.4–52.2)
HGB BLD-MCNC: 10.8 G/DL (ref 13.7–17.6)
IMM GRANULOCYTES # BLD: 0.03 10*3/MM3 (ref 0–0.03)
IMM GRANULOCYTES NFR BLD: 0.3 % (ref 0–0.5)
LYMPHOCYTES # BLD AUTO: 0.8 10*3/MM3 (ref 0.9–4.8)
LYMPHOCYTES NFR BLD AUTO: 9.3 % (ref 19.6–45.3)
MCH RBC QN AUTO: 32.3 PG (ref 27–32.7)
MCHC RBC AUTO-ENTMCNC: 33.2 G/DL (ref 32.6–36.4)
MCV RBC AUTO: 97.3 FL (ref 79.8–96.2)
MONOCYTES # BLD AUTO: 0.79 10*3/MM3 (ref 0.2–1.2)
MONOCYTES NFR BLD AUTO: 9.1 % (ref 5–12)
NEUTROPHILS # BLD AUTO: 5.84 10*3/MM3 (ref 1.9–8.1)
NEUTROPHILS NFR BLD AUTO: 67.7 % (ref 42.7–76)
PLATELET # BLD AUTO: 140 10*3/MM3 (ref 140–500)
PMV BLD AUTO: 9.9 FL (ref 6–12)
POTASSIUM BLD-SCNC: 4.1 MMOL/L (ref 3.5–5.2)
RBC # BLD AUTO: 3.34 10*6/MM3 (ref 4.6–6)
SODIUM BLD-SCNC: 135 MMOL/L (ref 136–145)
WBC NRBC COR # BLD: 8.64 10*3/MM3 (ref 4.5–10.7)

## 2018-01-19 PROCEDURE — 94799 UNLISTED PULMONARY SVC/PX: CPT

## 2018-01-19 PROCEDURE — 85025 COMPLETE CBC W/AUTO DIFF WBC: CPT | Performed by: INTERNAL MEDICINE

## 2018-01-19 PROCEDURE — 80048 BASIC METABOLIC PNL TOTAL CA: CPT | Performed by: INTERNAL MEDICINE

## 2018-01-19 PROCEDURE — 99024 POSTOP FOLLOW-UP VISIT: CPT | Performed by: NURSE PRACTITIONER

## 2018-01-19 PROCEDURE — 36415 COLL VENOUS BLD VENIPUNCTURE: CPT | Performed by: INTERNAL MEDICINE

## 2018-01-19 RX ORDER — FUROSEMIDE 20 MG/1
20 TABLET ORAL DAILY
Status: DISCONTINUED | OUTPATIENT
Start: 2018-01-19 | End: 2018-02-01 | Stop reason: HOSPADM

## 2018-01-19 RX ADMIN — MAGNESIUM HYDROXIDE 10 ML: 2400 SUSPENSION ORAL at 17:53

## 2018-01-19 RX ADMIN — ROPINIROLE HYDROCHLORIDE 0.5 MG: 0.5 TABLET, FILM COATED ORAL at 20:58

## 2018-01-19 RX ADMIN — SODIUM CHLORIDE SOLN NEBU 7% 4 ML: 7 NEBU SOLN at 08:45

## 2018-01-19 RX ADMIN — IPRATROPIUM BROMIDE AND ALBUTEROL SULFATE 3 ML: .5; 3 SOLUTION RESPIRATORY (INHALATION) at 13:12

## 2018-01-19 RX ADMIN — TIZANIDINE 2 MG: 4 TABLET ORAL at 15:05

## 2018-01-19 RX ADMIN — DOCUSATE SODIUM -SENNOSIDES 2 TABLET: 50; 8.6 TABLET, COATED ORAL at 21:01

## 2018-01-19 RX ADMIN — TIZANIDINE 2 MG: 4 TABLET ORAL at 23:36

## 2018-01-19 RX ADMIN — FUROSEMIDE 20 MG: 20 TABLET ORAL at 15:05

## 2018-01-19 RX ADMIN — SODIUM CHLORIDE SOLN NEBU 7% 4 ML: 7 NEBU SOLN at 19:56

## 2018-01-19 RX ADMIN — IPRATROPIUM BROMIDE AND ALBUTEROL SULFATE 3 ML: .5; 3 SOLUTION RESPIRATORY (INHALATION) at 17:06

## 2018-01-19 RX ADMIN — IPRATROPIUM BROMIDE AND ALBUTEROL SULFATE 3 ML: .5; 3 SOLUTION RESPIRATORY (INHALATION) at 19:55

## 2018-01-19 RX ADMIN — IPRATROPIUM BROMIDE AND ALBUTEROL SULFATE 3 ML: .5; 3 SOLUTION RESPIRATORY (INHALATION) at 08:40

## 2018-01-19 RX ADMIN — IPRATROPIUM BROMIDE AND ALBUTEROL SULFATE 3 ML: .5; 3 SOLUTION RESPIRATORY (INHALATION) at 03:55

## 2018-01-19 RX ADMIN — HYDROCODONE BITARTRATE AND ACETAMINOPHEN 1 TABLET: 5; 325 TABLET ORAL at 22:07

## 2018-01-19 RX ADMIN — IPRATROPIUM BROMIDE AND ALBUTEROL SULFATE 3 ML: .5; 3 SOLUTION RESPIRATORY (INHALATION) at 23:56

## 2018-01-19 RX ADMIN — HYDROCODONE BITARTRATE AND ACETAMINOPHEN 1 TABLET: 5; 325 TABLET ORAL at 13:45

## 2018-01-19 RX ADMIN — HYDROCODONE BITARTRATE AND ACETAMINOPHEN 1 TABLET: 5; 325 TABLET ORAL at 17:47

## 2018-01-19 RX ADMIN — HYDROCODONE BITARTRATE AND ACETAMINOPHEN 1 TABLET: 5; 325 TABLET ORAL at 08:34

## 2018-01-19 RX ADMIN — METHYLPHENIDATE HYDROCHLORIDE 10 MG: 10 TABLET ORAL at 08:34

## 2018-01-19 RX ADMIN — SODIUM CHLORIDE 75 ML/HR: 9 INJECTION, SOLUTION INTRAVENOUS at 00:51

## 2018-01-19 RX ADMIN — TAMSULOSIN HYDROCHLORIDE 0.4 MG: 0.4 CAPSULE ORAL at 08:34

## 2018-01-20 ENCOUNTER — APPOINTMENT (OUTPATIENT)
Dept: GENERAL RADIOLOGY | Facility: HOSPITAL | Age: 78
End: 2018-01-20

## 2018-01-20 PROBLEM — D72.829 LEUKOCYTOSIS: Status: RESOLVED | Noted: 2018-01-16 | Resolved: 2018-01-20

## 2018-01-20 PROBLEM — J18.9 RIGHT UPPER LOBE PNEUMONIA: Status: ACTIVE | Noted: 2018-01-20

## 2018-01-20 PROCEDURE — 25010000002 PIPERACILLIN SOD-TAZOBACTAM PER 1 G: Performed by: INTERNAL MEDICINE

## 2018-01-20 PROCEDURE — 87081 CULTURE SCREEN ONLY: CPT | Performed by: INTERNAL MEDICINE

## 2018-01-20 PROCEDURE — 94668 MNPJ CHEST WALL SBSQ: CPT

## 2018-01-20 PROCEDURE — 94799 UNLISTED PULMONARY SVC/PX: CPT

## 2018-01-20 PROCEDURE — 71045 X-RAY EXAM CHEST 1 VIEW: CPT

## 2018-01-20 PROCEDURE — 25010000002 VANCOMYCIN 10 G RECONSTITUTED SOLUTION: Performed by: INTERNAL MEDICINE

## 2018-01-20 PROCEDURE — 25010000002 METHYLPREDNISOLONE PER 40 MG: Performed by: INTERNAL MEDICINE

## 2018-01-20 PROCEDURE — 25010000002 HYDROMORPHONE PER 4 MG: Performed by: INTERNAL MEDICINE

## 2018-01-20 RX ORDER — CEFTRIAXONE SODIUM 1 G/50ML
1 INJECTION, SOLUTION INTRAVENOUS EVERY 24 HOURS
Status: DISCONTINUED | OUTPATIENT
Start: 2018-01-20 | End: 2018-01-20

## 2018-01-20 RX ORDER — ARFORMOTEROL TARTRATE 15 UG/2ML
15 SOLUTION RESPIRATORY (INHALATION)
Status: DISCONTINUED | OUTPATIENT
Start: 2018-01-20 | End: 2018-01-27

## 2018-01-20 RX ORDER — VANCOMYCIN HYDROCHLORIDE 1 G/200ML
1000 INJECTION, SOLUTION INTRAVENOUS EVERY 12 HOURS
Status: DISCONTINUED | OUTPATIENT
Start: 2018-01-20 | End: 2018-01-20 | Stop reason: DRUGHIGH

## 2018-01-20 RX ORDER — METHYLPREDNISOLONE SODIUM SUCCINATE 40 MG/ML
40 INJECTION, POWDER, LYOPHILIZED, FOR SOLUTION INTRAMUSCULAR; INTRAVENOUS EVERY 6 HOURS
Status: DISCONTINUED | OUTPATIENT
Start: 2018-01-20 | End: 2018-01-22

## 2018-01-20 RX ADMIN — TAMSULOSIN HYDROCHLORIDE 0.4 MG: 0.4 CAPSULE ORAL at 09:03

## 2018-01-20 RX ADMIN — VANCOMYCIN HYDROCHLORIDE 1500 MG: 10 INJECTION, POWDER, LYOPHILIZED, FOR SOLUTION INTRAVENOUS at 17:14

## 2018-01-20 RX ADMIN — TIZANIDINE 2 MG: 4 TABLET ORAL at 20:55

## 2018-01-20 RX ADMIN — TAZOBACTAM SODIUM AND PIPERACILLIN SODIUM 3.38 G: 375; 3 INJECTION, SOLUTION INTRAVENOUS at 15:41

## 2018-01-20 RX ADMIN — METHYLPREDNISOLONE SODIUM SUCCINATE 40 MG: 40 INJECTION, POWDER, FOR SOLUTION INTRAMUSCULAR; INTRAVENOUS at 10:10

## 2018-01-20 RX ADMIN — IPRATROPIUM BROMIDE AND ALBUTEROL SULFATE 3 ML: .5; 3 SOLUTION RESPIRATORY (INHALATION) at 15:08

## 2018-01-20 RX ADMIN — METHYLPREDNISOLONE SODIUM SUCCINATE 40 MG: 40 INJECTION, POWDER, FOR SOLUTION INTRAMUSCULAR; INTRAVENOUS at 17:19

## 2018-01-20 RX ADMIN — HYDROMORPHONE HYDROCHLORIDE 0.5 MG: 1 INJECTION, SOLUTION INTRAMUSCULAR; INTRAVENOUS; SUBCUTANEOUS at 20:26

## 2018-01-20 RX ADMIN — RIVAROXABAN 10 MG: 10 TABLET, FILM COATED ORAL at 17:19

## 2018-01-20 RX ADMIN — HYDROCODONE BITARTRATE AND ACETAMINOPHEN 1 TABLET: 5; 325 TABLET ORAL at 23:17

## 2018-01-20 RX ADMIN — SODIUM CHLORIDE SOLN NEBU 7% 4 ML: 7 NEBU SOLN at 20:43

## 2018-01-20 RX ADMIN — ROPINIROLE HYDROCHLORIDE 0.5 MG: 0.5 TABLET, FILM COATED ORAL at 20:54

## 2018-01-20 RX ADMIN — IPRATROPIUM BROMIDE AND ALBUTEROL SULFATE 3 ML: .5; 3 SOLUTION RESPIRATORY (INHALATION) at 11:44

## 2018-01-20 RX ADMIN — FUROSEMIDE 20 MG: 20 TABLET ORAL at 09:03

## 2018-01-20 RX ADMIN — TIZANIDINE 2 MG: 4 TABLET ORAL at 13:05

## 2018-01-20 RX ADMIN — IPRATROPIUM BROMIDE AND ALBUTEROL SULFATE 3 ML: .5; 3 SOLUTION RESPIRATORY (INHALATION) at 03:27

## 2018-01-20 RX ADMIN — MAGNESIUM HYDROXIDE 10 ML: 2400 SUSPENSION ORAL at 09:27

## 2018-01-20 RX ADMIN — METHYLPREDNISOLONE SODIUM SUCCINATE 40 MG: 40 INJECTION, POWDER, FOR SOLUTION INTRAMUSCULAR; INTRAVENOUS at 23:59

## 2018-01-20 RX ADMIN — AZITHROMYCIN 250 MG: 250 TABLET, FILM COATED ORAL at 09:03

## 2018-01-20 RX ADMIN — IPRATROPIUM BROMIDE AND ALBUTEROL SULFATE 3 ML: .5; 3 SOLUTION RESPIRATORY (INHALATION) at 23:53

## 2018-01-20 RX ADMIN — ARFORMOTEROL TARTRATE 15 MCG: 15 SOLUTION RESPIRATORY (INHALATION) at 20:43

## 2018-01-20 RX ADMIN — HYDROCODONE BITARTRATE AND ACETAMINOPHEN 1 TABLET: 5; 325 TABLET ORAL at 04:29

## 2018-01-20 RX ADMIN — IPRATROPIUM BROMIDE AND ALBUTEROL SULFATE 3 ML: .5; 3 SOLUTION RESPIRATORY (INHALATION) at 07:47

## 2018-01-20 RX ADMIN — TAZOBACTAM SODIUM AND PIPERACILLIN SODIUM 3.38 G: 375; 3 INJECTION, SOLUTION INTRAVENOUS at 20:55

## 2018-01-20 RX ADMIN — METHYLPHENIDATE HYDROCHLORIDE 10 MG: 10 TABLET ORAL at 09:03

## 2018-01-20 RX ADMIN — SODIUM CHLORIDE SOLN NEBU 7% 4 ML: 7 NEBU SOLN at 07:47

## 2018-01-21 LAB
CREAT BLD-MCNC: 0.63 MG/DL (ref 0.76–1.27)
GFR SERPL CREATININE-BSD FRML MDRD: 123 ML/MIN/1.73

## 2018-01-21 PROCEDURE — 25010000002 PIPERACILLIN SOD-TAZOBACTAM PER 1 G: Performed by: INTERNAL MEDICINE

## 2018-01-21 PROCEDURE — 94668 MNPJ CHEST WALL SBSQ: CPT

## 2018-01-21 PROCEDURE — 94799 UNLISTED PULMONARY SVC/PX: CPT

## 2018-01-21 PROCEDURE — 97110 THERAPEUTIC EXERCISES: CPT

## 2018-01-21 PROCEDURE — 25010000002 VANCOMYCIN 10 G RECONSTITUTED SOLUTION: Performed by: INTERNAL MEDICINE

## 2018-01-21 PROCEDURE — 25010000002 METHYLPREDNISOLONE PER 40 MG: Performed by: INTERNAL MEDICINE

## 2018-01-21 PROCEDURE — 87205 SMEAR GRAM STAIN: CPT | Performed by: INTERNAL MEDICINE

## 2018-01-21 PROCEDURE — 25010000002 HYDROMORPHONE PER 4 MG: Performed by: INTERNAL MEDICINE

## 2018-01-21 PROCEDURE — 87070 CULTURE OTHR SPECIMN AEROBIC: CPT | Performed by: INTERNAL MEDICINE

## 2018-01-21 PROCEDURE — 82565 ASSAY OF CREATININE: CPT | Performed by: INTERNAL MEDICINE

## 2018-01-21 RX ORDER — SODIUM CHLORIDE FOR INHALATION 7 %
4 VIAL, NEBULIZER (ML) INHALATION
Status: CANCELLED | OUTPATIENT
Start: 2018-01-22

## 2018-01-21 RX ORDER — HYDROCODONE BITARTRATE AND ACETAMINOPHEN 7.5; 325 MG/1; MG/1
1 TABLET ORAL EVERY 4 HOURS PRN
Status: DISCONTINUED | OUTPATIENT
Start: 2018-01-21 | End: 2018-02-01 | Stop reason: HOSPADM

## 2018-01-21 RX ORDER — SODIUM CHLORIDE FOR INHALATION 7 %
4 VIAL, NEBULIZER (ML) INHALATION
Status: DISCONTINUED | OUTPATIENT
Start: 2018-01-22 | End: 2018-01-30

## 2018-01-21 RX ORDER — HYDROCODONE BITARTRATE AND ACETAMINOPHEN 7.5; 325 MG/1; MG/1
1 TABLET ORAL EVERY 6 HOURS PRN
Status: DISCONTINUED | OUTPATIENT
Start: 2018-01-21 | End: 2018-01-21

## 2018-01-21 RX ADMIN — TAZOBACTAM SODIUM AND PIPERACILLIN SODIUM 3.38 G: 375; 3 INJECTION, SOLUTION INTRAVENOUS at 13:28

## 2018-01-21 RX ADMIN — VANCOMYCIN HYDROCHLORIDE 1250 MG: 10 INJECTION, POWDER, LYOPHILIZED, FOR SOLUTION INTRAVENOUS at 03:51

## 2018-01-21 RX ADMIN — HYDROMORPHONE HYDROCHLORIDE 0.5 MG: 1 INJECTION, SOLUTION INTRAMUSCULAR; INTRAVENOUS; SUBCUTANEOUS at 10:46

## 2018-01-21 RX ADMIN — HYDROCODONE BITARTRATE AND ACETAMINOPHEN 1 TABLET: 5; 325 TABLET ORAL at 09:03

## 2018-01-21 RX ADMIN — TAMSULOSIN HYDROCHLORIDE 0.4 MG: 0.4 CAPSULE ORAL at 08:46

## 2018-01-21 RX ADMIN — METHYLPREDNISOLONE SODIUM SUCCINATE 40 MG: 40 INJECTION, POWDER, FOR SOLUTION INTRAMUSCULAR; INTRAVENOUS at 16:21

## 2018-01-21 RX ADMIN — FUROSEMIDE 20 MG: 20 TABLET ORAL at 08:46

## 2018-01-21 RX ADMIN — VANCOMYCIN HYDROCHLORIDE 1250 MG: 10 INJECTION, POWDER, LYOPHILIZED, FOR SOLUTION INTRAVENOUS at 19:35

## 2018-01-21 RX ADMIN — TIZANIDINE 2 MG: 4 TABLET ORAL at 15:27

## 2018-01-21 RX ADMIN — TAZOBACTAM SODIUM AND PIPERACILLIN SODIUM 3.38 G: 375; 3 INJECTION, SOLUTION INTRAVENOUS at 22:28

## 2018-01-21 RX ADMIN — METHYLPREDNISOLONE SODIUM SUCCINATE 40 MG: 40 INJECTION, POWDER, FOR SOLUTION INTRAMUSCULAR; INTRAVENOUS at 10:18

## 2018-01-21 RX ADMIN — HYDROCODONE BITARTRATE AND ACETAMINOPHEN 1 TABLET: 7.5; 325 TABLET ORAL at 18:22

## 2018-01-21 RX ADMIN — HYDROCODONE BITARTRATE AND ACETAMINOPHEN 1 TABLET: 7.5; 325 TABLET ORAL at 13:28

## 2018-01-21 RX ADMIN — TIZANIDINE 2 MG: 4 TABLET ORAL at 06:48

## 2018-01-21 RX ADMIN — METHYLPREDNISOLONE SODIUM SUCCINATE 40 MG: 40 INJECTION, POWDER, FOR SOLUTION INTRAMUSCULAR; INTRAVENOUS at 23:55

## 2018-01-21 RX ADMIN — SODIUM CHLORIDE SOLN NEBU 7% 4 ML: 7 NEBU SOLN at 07:04

## 2018-01-21 RX ADMIN — IPRATROPIUM BROMIDE AND ALBUTEROL SULFATE 3 ML: .5; 3 SOLUTION RESPIRATORY (INHALATION) at 20:24

## 2018-01-21 RX ADMIN — SODIUM CHLORIDE SOLN NEBU 7% 4 ML: 7 NEBU SOLN at 20:24

## 2018-01-21 RX ADMIN — ARFORMOTEROL TARTRATE 15 MCG: 15 SOLUTION RESPIRATORY (INHALATION) at 09:31

## 2018-01-21 RX ADMIN — IPRATROPIUM BROMIDE AND ALBUTEROL SULFATE 3 ML: .5; 3 SOLUTION RESPIRATORY (INHALATION) at 07:04

## 2018-01-21 RX ADMIN — METHYLPHENIDATE HYDROCHLORIDE 10 MG: 10 TABLET ORAL at 08:46

## 2018-01-21 RX ADMIN — ARFORMOTEROL TARTRATE 15 MCG: 15 SOLUTION RESPIRATORY (INHALATION) at 23:43

## 2018-01-21 RX ADMIN — IPRATROPIUM BROMIDE AND ALBUTEROL SULFATE 3 ML: .5; 3 SOLUTION RESPIRATORY (INHALATION) at 04:11

## 2018-01-21 RX ADMIN — METHYLPREDNISOLONE SODIUM SUCCINATE 40 MG: 40 INJECTION, POWDER, FOR SOLUTION INTRAMUSCULAR; INTRAVENOUS at 04:46

## 2018-01-21 RX ADMIN — TAZOBACTAM SODIUM AND PIPERACILLIN SODIUM 3.38 G: 375; 3 INJECTION, SOLUTION INTRAVENOUS at 06:28

## 2018-01-21 RX ADMIN — RIVAROXABAN 10 MG: 10 TABLET, FILM COATED ORAL at 18:22

## 2018-01-21 RX ADMIN — IPRATROPIUM BROMIDE AND ALBUTEROL SULFATE 3 ML: .5; 3 SOLUTION RESPIRATORY (INHALATION) at 16:36

## 2018-01-21 RX ADMIN — ROPINIROLE HYDROCHLORIDE 0.5 MG: 0.5 TABLET, FILM COATED ORAL at 20:53

## 2018-01-21 RX ADMIN — IPRATROPIUM BROMIDE AND ALBUTEROL SULFATE 3 ML: .5; 3 SOLUTION RESPIRATORY (INHALATION) at 11:41

## 2018-01-22 ENCOUNTER — ANESTHESIA (OUTPATIENT)
Dept: GASTROENTEROLOGY | Facility: HOSPITAL | Age: 78
End: 2018-01-22

## 2018-01-22 ENCOUNTER — ANESTHESIA EVENT (OUTPATIENT)
Dept: GASTROENTEROLOGY | Facility: HOSPITAL | Age: 78
End: 2018-01-22

## 2018-01-22 ENCOUNTER — APPOINTMENT (OUTPATIENT)
Dept: GENERAL RADIOLOGY | Facility: HOSPITAL | Age: 78
End: 2018-01-22

## 2018-01-22 LAB
GLUCOSE BLDC GLUCOMTR-MCNC: 121 MG/DL (ref 70–130)
GLUCOSE BLDC GLUCOMTR-MCNC: 165 MG/DL (ref 70–130)
GLUCOSE BLDC GLUCOMTR-MCNC: 171 MG/DL (ref 70–130)
HCT VFR BLD AUTO: 32.3 % (ref 40.4–52.2)
HGB BLD-MCNC: 10.4 G/DL (ref 13.7–17.6)
MRSA SPEC QL CULT: NORMAL
VANCOMYCIN TROUGH SERPL-MCNC: 16 MCG/ML (ref 5–20)

## 2018-01-22 PROCEDURE — 82962 GLUCOSE BLOOD TEST: CPT

## 2018-01-22 PROCEDURE — 25010000002 FENTANYL CITRATE (PF) 100 MCG/2ML SOLUTION: Performed by: ANESTHESIOLOGY

## 2018-01-22 PROCEDURE — 85018 HEMOGLOBIN: CPT | Performed by: HOSPITALIST

## 2018-01-22 PROCEDURE — 0B9F8ZX DRAINAGE OF RIGHT LOWER LUNG LOBE, VIA NATURAL OR ARTIFICIAL OPENING ENDOSCOPIC, DIAGNOSTIC: ICD-10-PCS | Performed by: INTERNAL MEDICINE

## 2018-01-22 PROCEDURE — 71045 X-RAY EXAM CHEST 1 VIEW: CPT

## 2018-01-22 PROCEDURE — 94667 MNPJ CHEST WALL 1ST: CPT

## 2018-01-22 PROCEDURE — 25010000002 PIPERACILLIN SOD-TAZOBACTAM PER 1 G: Performed by: INTERNAL MEDICINE

## 2018-01-22 PROCEDURE — 87205 SMEAR GRAM STAIN: CPT | Performed by: INTERNAL MEDICINE

## 2018-01-22 PROCEDURE — 94799 UNLISTED PULMONARY SVC/PX: CPT

## 2018-01-22 PROCEDURE — 94760 N-INVAS EAR/PLS OXIMETRY 1: CPT

## 2018-01-22 PROCEDURE — 25010000002 METHYLPREDNISOLONE PER 40 MG: Performed by: INTERNAL MEDICINE

## 2018-01-22 PROCEDURE — 63710000001 PREDNISONE PER 1 MG: Performed by: INTERNAL MEDICINE

## 2018-01-22 PROCEDURE — 25010000002 PROPOFOL 10 MG/ML EMULSION: Performed by: ANESTHESIOLOGY

## 2018-01-22 PROCEDURE — 25010000002 VANCOMYCIN 10 G RECONSTITUTED SOLUTION: Performed by: INTERNAL MEDICINE

## 2018-01-22 PROCEDURE — 87070 CULTURE OTHR SPECIMN AEROBIC: CPT | Performed by: INTERNAL MEDICINE

## 2018-01-22 PROCEDURE — 94668 MNPJ CHEST WALL SBSQ: CPT

## 2018-01-22 PROCEDURE — 97110 THERAPEUTIC EXERCISES: CPT

## 2018-01-22 PROCEDURE — 80202 ASSAY OF VANCOMYCIN: CPT | Performed by: INTERNAL MEDICINE

## 2018-01-22 PROCEDURE — 85014 HEMATOCRIT: CPT | Performed by: HOSPITALIST

## 2018-01-22 RX ORDER — PREDNISONE 20 MG/1
20 TABLET ORAL 2 TIMES DAILY WITH MEALS
Status: DISCONTINUED | OUTPATIENT
Start: 2018-01-22 | End: 2018-01-27

## 2018-01-22 RX ORDER — LIDOCAINE HYDROCHLORIDE 20 MG/ML
INJECTION, SOLUTION EPIDURAL; INFILTRATION; INTRACAUDAL; PERINEURAL AS NEEDED
Status: DISCONTINUED | OUTPATIENT
Start: 2018-01-22 | End: 2018-01-22 | Stop reason: HOSPADM

## 2018-01-22 RX ORDER — FENTANYL CITRATE 50 UG/ML
INJECTION, SOLUTION INTRAMUSCULAR; INTRAVENOUS AS NEEDED
Status: DISCONTINUED | OUTPATIENT
Start: 2018-01-22 | End: 2018-01-22 | Stop reason: SURG

## 2018-01-22 RX ORDER — PROPOFOL 10 MG/ML
VIAL (ML) INTRAVENOUS CONTINUOUS PRN
Status: DISCONTINUED | OUTPATIENT
Start: 2018-01-22 | End: 2018-01-22 | Stop reason: SURG

## 2018-01-22 RX ORDER — PROPOFOL 10 MG/ML
VIAL (ML) INTRAVENOUS AS NEEDED
Status: DISCONTINUED | OUTPATIENT
Start: 2018-01-22 | End: 2018-01-22 | Stop reason: SURG

## 2018-01-22 RX ORDER — PANTOPRAZOLE SODIUM 40 MG/1
40 TABLET, DELAYED RELEASE ORAL
Status: DISCONTINUED | OUTPATIENT
Start: 2018-01-22 | End: 2018-02-01 | Stop reason: HOSPADM

## 2018-01-22 RX ORDER — LIDOCAINE HYDROCHLORIDE 10 MG/ML
INJECTION, SOLUTION EPIDURAL; INFILTRATION; INTRACAUDAL; PERINEURAL AS NEEDED
Status: DISCONTINUED | OUTPATIENT
Start: 2018-01-22 | End: 2018-01-22 | Stop reason: HOSPADM

## 2018-01-22 RX ORDER — SODIUM CHLORIDE, SODIUM LACTATE, POTASSIUM CHLORIDE, CALCIUM CHLORIDE 600; 310; 30; 20 MG/100ML; MG/100ML; MG/100ML; MG/100ML
INJECTION, SOLUTION INTRAVENOUS CONTINUOUS PRN
Status: DISCONTINUED | OUTPATIENT
Start: 2018-01-22 | End: 2018-01-22 | Stop reason: SURG

## 2018-01-22 RX ADMIN — DOCUSATE SODIUM -SENNOSIDES 2 TABLET: 50; 8.6 TABLET, COATED ORAL at 20:45

## 2018-01-22 RX ADMIN — TAMSULOSIN HYDROCHLORIDE 0.4 MG: 0.4 CAPSULE ORAL at 13:37

## 2018-01-22 RX ADMIN — PROPOFOL 100 MG: 10 INJECTION, EMULSION INTRAVENOUS at 09:50

## 2018-01-22 RX ADMIN — METHYLPREDNISOLONE SODIUM SUCCINATE 40 MG: 40 INJECTION, POWDER, FOR SOLUTION INTRAMUSCULAR; INTRAVENOUS at 04:10

## 2018-01-22 RX ADMIN — RIVAROXABAN 10 MG: 10 TABLET, FILM COATED ORAL at 17:41

## 2018-01-22 RX ADMIN — VANCOMYCIN HYDROCHLORIDE 1250 MG: 10 INJECTION, POWDER, LYOPHILIZED, FOR SOLUTION INTRAVENOUS at 16:39

## 2018-01-22 RX ADMIN — FENTANYL CITRATE 100 MCG: 50 INJECTION INTRAMUSCULAR; INTRAVENOUS at 09:30

## 2018-01-22 RX ADMIN — SILVER SULFADIAZINE: 10 CREAM TOPICAL at 21:02

## 2018-01-22 RX ADMIN — TAZOBACTAM SODIUM AND PIPERACILLIN SODIUM 3.38 G: 375; 3 INJECTION, SOLUTION INTRAVENOUS at 20:45

## 2018-01-22 RX ADMIN — ARFORMOTEROL TARTRATE 15 MCG: 15 SOLUTION RESPIRATORY (INHALATION) at 11:04

## 2018-01-22 RX ADMIN — IPRATROPIUM BROMIDE AND ALBUTEROL SULFATE 3 ML: .5; 3 SOLUTION RESPIRATORY (INHALATION) at 03:54

## 2018-01-22 RX ADMIN — FUROSEMIDE 20 MG: 20 TABLET ORAL at 13:37

## 2018-01-22 RX ADMIN — IPRATROPIUM BROMIDE AND ALBUTEROL SULFATE 3 ML: .5; 3 SOLUTION RESPIRATORY (INHALATION) at 23:39

## 2018-01-22 RX ADMIN — TIZANIDINE 2 MG: 4 TABLET ORAL at 21:31

## 2018-01-22 RX ADMIN — TIZANIDINE 2 MG: 4 TABLET ORAL at 13:20

## 2018-01-22 RX ADMIN — SILVER SULFADIAZINE: 10 CREAM TOPICAL at 13:47

## 2018-01-22 RX ADMIN — SODIUM CHLORIDE SOLN NEBU 7% 4 ML: 7 NEBU SOLN at 08:19

## 2018-01-22 RX ADMIN — PROPOFOL 100 MG: 10 INJECTION, EMULSION INTRAVENOUS at 09:30

## 2018-01-22 RX ADMIN — IPRATROPIUM BROMIDE AND ALBUTEROL SULFATE 3 ML: .5; 3 SOLUTION RESPIRATORY (INHALATION) at 08:19

## 2018-01-22 RX ADMIN — TAZOBACTAM SODIUM AND PIPERACILLIN SODIUM 3.38 G: 375; 3 INJECTION, SOLUTION INTRAVENOUS at 13:20

## 2018-01-22 RX ADMIN — ROPINIROLE HYDROCHLORIDE 0.5 MG: 0.5 TABLET, FILM COATED ORAL at 21:30

## 2018-01-22 RX ADMIN — HYDROCODONE BITARTRATE AND ACETAMINOPHEN 1 TABLET: 7.5; 325 TABLET ORAL at 20:45

## 2018-01-22 RX ADMIN — PANTOPRAZOLE SODIUM 40 MG: 40 TABLET, DELAYED RELEASE ORAL at 16:20

## 2018-01-22 RX ADMIN — IPRATROPIUM BROMIDE AND ALBUTEROL SULFATE 3 ML: .5; 3 SOLUTION RESPIRATORY (INHALATION) at 21:39

## 2018-01-22 RX ADMIN — PROPOFOL 100 MG: 10 INJECTION, EMULSION INTRAVENOUS at 09:35

## 2018-01-22 RX ADMIN — IPRATROPIUM BROMIDE AND ALBUTEROL SULFATE 3 ML: .5; 3 SOLUTION RESPIRATORY (INHALATION) at 15:24

## 2018-01-22 RX ADMIN — PROPOFOL 100 MG: 10 INJECTION, EMULSION INTRAVENOUS at 09:40

## 2018-01-22 RX ADMIN — PREDNISONE 20 MG: 20 TABLET ORAL at 17:41

## 2018-01-22 RX ADMIN — PROPOFOL 100 MG: 10 INJECTION, EMULSION INTRAVENOUS at 09:55

## 2018-01-22 RX ADMIN — IPRATROPIUM BROMIDE AND ALBUTEROL SULFATE 3 ML: .5; 3 SOLUTION RESPIRATORY (INHALATION) at 06:21

## 2018-01-22 RX ADMIN — PROPOFOL 100 MG: 10 INJECTION, EMULSION INTRAVENOUS at 09:45

## 2018-01-22 RX ADMIN — PROPOFOL 100 MCG/KG/MIN: 10 INJECTION, EMULSION INTRAVENOUS at 09:30

## 2018-01-22 RX ADMIN — SODIUM CHLORIDE SOLN NEBU 7% 4 ML: 7 NEBU SOLN at 21:39

## 2018-01-22 RX ADMIN — VANCOMYCIN HYDROCHLORIDE 1250 MG: 10 INJECTION, POWDER, LYOPHILIZED, FOR SOLUTION INTRAVENOUS at 04:10

## 2018-01-22 RX ADMIN — SODIUM CHLORIDE, POTASSIUM CHLORIDE, SODIUM LACTATE AND CALCIUM CHLORIDE: 600; 310; 30; 20 INJECTION, SOLUTION INTRAVENOUS at 09:30

## 2018-01-22 NOTE — ANESTHESIA POSTPROCEDURE EVALUATION
"Patient: Tony Leonard    Procedure Summary     Date Anesthesia Start Anesthesia Stop Room / Location    01/22/18 0930 1001  [facility] /  [facility]       Procedure Diagnosis Surgeon Provider    BRONCHOSCOPY AT BEDSIDE (N/A ) (mucus plug) MD Juliano Smith MD          Anesthesia Type: MAC  Last vitals  BP   123/67 (01/22/18 1010)   Temp   36.8 °C (98.2 °F) (01/22/18 0046)   Pulse   93 (01/22/18 1010)   Resp   26 (01/22/18 0819)     SpO2   100 % (01/22/18 1010)     Post Anesthesia Care and Evaluation    Patient location during evaluation: ICU  Patient participation: complete - patient participated  Level of consciousness: sleepy but conscious and responsive to verbal stimuli  Pain score: 0  Pain management: adequate  Airway patency: patent  Anesthetic complications: No anesthetic complications    Cardiovascular status: acceptable  Respiratory status: acceptable  Hydration status: acceptable    Comments: /67  Pulse 93  Temp 36.8 °C (98.2 °F) (Oral)   Resp 26  Ht 180.3 cm (71\")  Wt 77.5 kg (170 lb 14.4 oz)  SpO2 100%  BMI 23.84 kg/m2        "

## 2018-01-22 NOTE — ANESTHESIA PROCEDURE NOTES
Airway  Urgency: elective    Date/Time: 1/22/2018 9:32 AM  End Time:1/22/2018 9:32 AM    General Information and Staff    Patient location during procedure: OR  Anesthesiologist: IMELDA TRINIDAD    Indications and Patient Condition  Indications for airway management: airway protection    Preoxygenated: yes  Mask difficulty assessment: 0 - not attempted    Final Airway Details  Final airway type: supraglottic airway      Successful airway: classic  Size 5    Number of attempts at approach: 1

## 2018-01-22 NOTE — ANESTHESIA PREPROCEDURE EVALUATION
Anesthesia Evaluation     Patient summary reviewed and Nursing notes reviewed   NPO Solid Status: > 8 hours  NPO Liquid Status: > 8 hours     Airway   Mallampati: II  Neck ROM: full  no difficulty expected  Dental      Pulmonary    (+) pneumonia worsening , COPD, decreased breath sounds,   Cardiovascular     Rhythm: regular    (+) CHF,       Neuro/Psych  (+) psychiatric history,     GI/Hepatic/Renal/Endo    (+)  GERD,     Musculoskeletal         ROS comment: Broken hip  Abdominal    Substance History      OB/GYN          Other   (+) arthritis                                             Anesthesia Plan    ASA 4     MAC     intravenous induction   Anesthetic plan and risks discussed with patient.

## 2018-01-23 ENCOUNTER — APPOINTMENT (OUTPATIENT)
Dept: GENERAL RADIOLOGY | Facility: HOSPITAL | Age: 78
End: 2018-01-23

## 2018-01-23 LAB
CREAT BLD-MCNC: 0.8 MG/DL (ref 0.76–1.27)
GFR SERPL CREATININE-BSD FRML MDRD: 93 ML/MIN/1.73

## 2018-01-23 PROCEDURE — 82565 ASSAY OF CREATININE: CPT | Performed by: INTERNAL MEDICINE

## 2018-01-23 PROCEDURE — 71045 X-RAY EXAM CHEST 1 VIEW: CPT

## 2018-01-23 PROCEDURE — 25010000002 VANCOMYCIN 10 G RECONSTITUTED SOLUTION: Performed by: INTERNAL MEDICINE

## 2018-01-23 PROCEDURE — 63710000001 PREDNISONE PER 1 MG: Performed by: INTERNAL MEDICINE

## 2018-01-23 PROCEDURE — 94799 UNLISTED PULMONARY SVC/PX: CPT

## 2018-01-23 PROCEDURE — 25010000002 PIPERACILLIN SOD-TAZOBACTAM PER 1 G: Performed by: INTERNAL MEDICINE

## 2018-01-23 PROCEDURE — 94668 MNPJ CHEST WALL SBSQ: CPT

## 2018-01-23 PROCEDURE — 97110 THERAPEUTIC EXERCISES: CPT

## 2018-01-23 RX ADMIN — VANCOMYCIN HYDROCHLORIDE 1250 MG: 10 INJECTION, POWDER, LYOPHILIZED, FOR SOLUTION INTRAVENOUS at 03:05

## 2018-01-23 RX ADMIN — PREDNISONE 20 MG: 20 TABLET ORAL at 08:12

## 2018-01-23 RX ADMIN — PREDNISONE 20 MG: 20 TABLET ORAL at 17:50

## 2018-01-23 RX ADMIN — TAZOBACTAM SODIUM AND PIPERACILLIN SODIUM 3.38 G: 375; 3 INJECTION, SOLUTION INTRAVENOUS at 06:13

## 2018-01-23 RX ADMIN — VANCOMYCIN HYDROCHLORIDE 1250 MG: 10 INJECTION, POWDER, LYOPHILIZED, FOR SOLUTION INTRAVENOUS at 14:19

## 2018-01-23 RX ADMIN — ARFORMOTEROL TARTRATE 15 MCG: 15 SOLUTION RESPIRATORY (INHALATION) at 08:07

## 2018-01-23 RX ADMIN — IPRATROPIUM BROMIDE AND ALBUTEROL SULFATE 3 ML: .5; 3 SOLUTION RESPIRATORY (INHALATION) at 15:22

## 2018-01-23 RX ADMIN — HYDROCODONE BITARTRATE AND ACETAMINOPHEN 1 TABLET: 7.5; 325 TABLET ORAL at 01:01

## 2018-01-23 RX ADMIN — METHYLPHENIDATE HYDROCHLORIDE 10 MG: 10 TABLET ORAL at 08:15

## 2018-01-23 RX ADMIN — RIVAROXABAN 10 MG: 10 TABLET, FILM COATED ORAL at 17:51

## 2018-01-23 RX ADMIN — FUROSEMIDE 20 MG: 20 TABLET ORAL at 08:12

## 2018-01-23 RX ADMIN — HYDROCODONE BITARTRATE AND ACETAMINOPHEN 1 TABLET: 7.5; 325 TABLET ORAL at 16:12

## 2018-01-23 RX ADMIN — IPRATROPIUM BROMIDE AND ALBUTEROL SULFATE 3 ML: .5; 3 SOLUTION RESPIRATORY (INHALATION) at 12:07

## 2018-01-23 RX ADMIN — TAZOBACTAM SODIUM AND PIPERACILLIN SODIUM 3.38 G: 375; 3 INJECTION, SOLUTION INTRAVENOUS at 20:50

## 2018-01-23 RX ADMIN — IPRATROPIUM BROMIDE AND ALBUTEROL SULFATE 3 ML: .5; 3 SOLUTION RESPIRATORY (INHALATION) at 03:33

## 2018-01-23 RX ADMIN — SODIUM CHLORIDE SOLN NEBU 7% 4 ML: 7 NEBU SOLN at 19:57

## 2018-01-23 RX ADMIN — SILVER SULFADIAZINE: 10 CREAM TOPICAL at 20:50

## 2018-01-23 RX ADMIN — IPRATROPIUM BROMIDE AND ALBUTEROL SULFATE 3 ML: .5; 3 SOLUTION RESPIRATORY (INHALATION) at 19:57

## 2018-01-23 RX ADMIN — IPRATROPIUM BROMIDE AND ALBUTEROL SULFATE 3 ML: .5; 3 SOLUTION RESPIRATORY (INHALATION) at 07:03

## 2018-01-23 RX ADMIN — ROPINIROLE HYDROCHLORIDE 0.5 MG: 0.5 TABLET, FILM COATED ORAL at 20:49

## 2018-01-23 RX ADMIN — SODIUM CHLORIDE SOLN NEBU 7% 4 ML: 7 NEBU SOLN at 07:02

## 2018-01-23 RX ADMIN — TAZOBACTAM SODIUM AND PIPERACILLIN SODIUM 3.38 G: 375; 3 INJECTION, SOLUTION INTRAVENOUS at 16:15

## 2018-01-23 RX ADMIN — SILVER SULFADIAZINE: 10 CREAM TOPICAL at 11:11

## 2018-01-23 RX ADMIN — IPRATROPIUM BROMIDE AND ALBUTEROL SULFATE 3 ML: .5; 3 SOLUTION RESPIRATORY (INHALATION) at 23:21

## 2018-01-23 RX ADMIN — TAMSULOSIN HYDROCHLORIDE 0.4 MG: 0.4 CAPSULE ORAL at 08:12

## 2018-01-23 RX ADMIN — PANTOPRAZOLE SODIUM 40 MG: 40 TABLET, DELAYED RELEASE ORAL at 06:39

## 2018-01-23 RX ADMIN — ARFORMOTEROL TARTRATE 15 MCG: 15 SOLUTION RESPIRATORY (INHALATION) at 20:10

## 2018-01-24 LAB
BACTERIA SPEC RESP CULT: NORMAL
BACTERIA SPEC RESP CULT: NORMAL
GRAM STN SPEC: NORMAL

## 2018-01-24 PROCEDURE — 94668 MNPJ CHEST WALL SBSQ: CPT

## 2018-01-24 PROCEDURE — 94799 UNLISTED PULMONARY SVC/PX: CPT

## 2018-01-24 PROCEDURE — 63710000001 PREDNISONE PER 1 MG: Performed by: INTERNAL MEDICINE

## 2018-01-24 PROCEDURE — 25010000002 PIPERACILLIN SOD-TAZOBACTAM PER 1 G: Performed by: INTERNAL MEDICINE

## 2018-01-24 PROCEDURE — 97110 THERAPEUTIC EXERCISES: CPT

## 2018-01-24 RX ADMIN — TAZOBACTAM SODIUM AND PIPERACILLIN SODIUM 3.38 G: 375; 3 INJECTION, SOLUTION INTRAVENOUS at 20:30

## 2018-01-24 RX ADMIN — IPRATROPIUM BROMIDE AND ALBUTEROL SULFATE 3 ML: .5; 3 SOLUTION RESPIRATORY (INHALATION) at 16:13

## 2018-01-24 RX ADMIN — SODIUM CHLORIDE SOLN NEBU 7% 4 ML: 7 NEBU SOLN at 19:54

## 2018-01-24 RX ADMIN — PREDNISONE 20 MG: 20 TABLET ORAL at 17:52

## 2018-01-24 RX ADMIN — RIVAROXABAN 10 MG: 10 TABLET, FILM COATED ORAL at 17:52

## 2018-01-24 RX ADMIN — HYDROCODONE BITARTRATE AND ACETAMINOPHEN 1 TABLET: 7.5; 325 TABLET ORAL at 14:24

## 2018-01-24 RX ADMIN — IPRATROPIUM BROMIDE AND ALBUTEROL SULFATE 3 ML: .5; 3 SOLUTION RESPIRATORY (INHALATION) at 08:30

## 2018-01-24 RX ADMIN — SILVER SULFADIAZINE 1 APPLICATION: 10 CREAM TOPICAL at 22:10

## 2018-01-24 RX ADMIN — METHYLPHENIDATE HYDROCHLORIDE 10 MG: 10 TABLET ORAL at 09:05

## 2018-01-24 RX ADMIN — PREDNISONE 20 MG: 20 TABLET ORAL at 09:00

## 2018-01-24 RX ADMIN — ROPINIROLE HYDROCHLORIDE 0.5 MG: 0.5 TABLET, FILM COATED ORAL at 20:30

## 2018-01-24 RX ADMIN — TAZOBACTAM SODIUM AND PIPERACILLIN SODIUM 3.38 G: 375; 3 INJECTION, SOLUTION INTRAVENOUS at 06:30

## 2018-01-24 RX ADMIN — SILVER SULFADIAZINE 1 APPLICATION: 10 CREAM TOPICAL at 09:02

## 2018-01-24 RX ADMIN — DOCUSATE SODIUM -SENNOSIDES 2 TABLET: 50; 8.6 TABLET, COATED ORAL at 09:01

## 2018-01-24 RX ADMIN — SODIUM CHLORIDE SOLN NEBU 7% 4 ML: 7 NEBU SOLN at 08:30

## 2018-01-24 RX ADMIN — ARFORMOTEROL TARTRATE 15 MCG: 15 SOLUTION RESPIRATORY (INHALATION) at 19:53

## 2018-01-24 RX ADMIN — PANTOPRAZOLE SODIUM 40 MG: 40 TABLET, DELAYED RELEASE ORAL at 06:30

## 2018-01-24 RX ADMIN — TAZOBACTAM SODIUM AND PIPERACILLIN SODIUM 3.38 G: 375; 3 INJECTION, SOLUTION INTRAVENOUS at 14:33

## 2018-01-24 RX ADMIN — HYDROCODONE BITARTRATE AND ACETAMINOPHEN 1 TABLET: 7.5; 325 TABLET ORAL at 22:16

## 2018-01-24 RX ADMIN — IPRATROPIUM BROMIDE AND ALBUTEROL SULFATE 3 ML: .5; 3 SOLUTION RESPIRATORY (INHALATION) at 12:15

## 2018-01-24 RX ADMIN — ARFORMOTEROL TARTRATE 15 MCG: 15 SOLUTION RESPIRATORY (INHALATION) at 10:08

## 2018-01-24 RX ADMIN — TAMSULOSIN HYDROCHLORIDE 0.4 MG: 0.4 CAPSULE ORAL at 09:01

## 2018-01-24 RX ADMIN — IPRATROPIUM BROMIDE AND ALBUTEROL SULFATE 3 ML: .5; 3 SOLUTION RESPIRATORY (INHALATION) at 03:59

## 2018-01-24 RX ADMIN — FUROSEMIDE 20 MG: 20 TABLET ORAL at 09:00

## 2018-01-25 ENCOUNTER — APPOINTMENT (OUTPATIENT)
Dept: GENERAL RADIOLOGY | Facility: HOSPITAL | Age: 78
End: 2018-01-25

## 2018-01-25 PROBLEM — R33.9 URINARY RETENTION: Status: ACTIVE | Noted: 2018-01-25

## 2018-01-25 PROCEDURE — 94799 UNLISTED PULMONARY SVC/PX: CPT

## 2018-01-25 PROCEDURE — 71046 X-RAY EXAM CHEST 2 VIEWS: CPT

## 2018-01-25 PROCEDURE — 63710000001 PREDNISONE PER 1 MG: Performed by: INTERNAL MEDICINE

## 2018-01-25 PROCEDURE — 97110 THERAPEUTIC EXERCISES: CPT

## 2018-01-25 PROCEDURE — 25010000002 PIPERACILLIN SOD-TAZOBACTAM PER 1 G: Performed by: INTERNAL MEDICINE

## 2018-01-25 PROCEDURE — 94668 MNPJ CHEST WALL SBSQ: CPT

## 2018-01-25 RX ORDER — PHENAZOPYRIDINE HYDROCHLORIDE 200 MG/1
200 TABLET, FILM COATED ORAL
Status: DISCONTINUED | OUTPATIENT
Start: 2018-01-25 | End: 2018-01-27

## 2018-01-25 RX ORDER — TAMSULOSIN HYDROCHLORIDE 0.4 MG/1
0.8 CAPSULE ORAL DAILY
Status: DISCONTINUED | OUTPATIENT
Start: 2018-01-26 | End: 2018-02-01 | Stop reason: HOSPADM

## 2018-01-25 RX ADMIN — TAZOBACTAM SODIUM AND PIPERACILLIN SODIUM 3.38 G: 375; 3 INJECTION, SOLUTION INTRAVENOUS at 16:55

## 2018-01-25 RX ADMIN — ROPINIROLE HYDROCHLORIDE 0.5 MG: 0.5 TABLET, FILM COATED ORAL at 23:17

## 2018-01-25 RX ADMIN — HYDROCODONE BITARTRATE AND ACETAMINOPHEN 1 TABLET: 7.5; 325 TABLET ORAL at 23:24

## 2018-01-25 RX ADMIN — METHYLPHENIDATE HYDROCHLORIDE 10 MG: 10 TABLET ORAL at 09:42

## 2018-01-25 RX ADMIN — SILVER SULFADIAZINE: 10 CREAM TOPICAL at 23:17

## 2018-01-25 RX ADMIN — HYDROCODONE BITARTRATE AND ACETAMINOPHEN 1 TABLET: 7.5; 325 TABLET ORAL at 10:53

## 2018-01-25 RX ADMIN — ARFORMOTEROL TARTRATE 15 MCG: 15 SOLUTION RESPIRATORY (INHALATION) at 23:32

## 2018-01-25 RX ADMIN — PREDNISONE 20 MG: 20 TABLET ORAL at 09:42

## 2018-01-25 RX ADMIN — PANTOPRAZOLE SODIUM 40 MG: 40 TABLET, DELAYED RELEASE ORAL at 06:31

## 2018-01-25 RX ADMIN — TAMSULOSIN HYDROCHLORIDE 0.4 MG: 0.4 CAPSULE ORAL at 09:42

## 2018-01-25 RX ADMIN — FUROSEMIDE 20 MG: 20 TABLET ORAL at 09:42

## 2018-01-25 RX ADMIN — TAZOBACTAM SODIUM AND PIPERACILLIN SODIUM 3.38 G: 375; 3 INJECTION, SOLUTION INTRAVENOUS at 06:31

## 2018-01-25 RX ADMIN — IPRATROPIUM BROMIDE AND ALBUTEROL SULFATE 3 ML: .5; 3 SOLUTION RESPIRATORY (INHALATION) at 19:11

## 2018-01-25 RX ADMIN — SODIUM CHLORIDE SOLN NEBU 7% 4 ML: 7 NEBU SOLN at 07:10

## 2018-01-25 RX ADMIN — RIVAROXABAN 10 MG: 10 TABLET, FILM COATED ORAL at 17:00

## 2018-01-25 RX ADMIN — SILVER SULFADIAZINE: 10 CREAM TOPICAL at 10:53

## 2018-01-25 RX ADMIN — TAZOBACTAM SODIUM AND PIPERACILLIN SODIUM 3.38 G: 375; 3 INJECTION, SOLUTION INTRAVENOUS at 23:18

## 2018-01-25 RX ADMIN — PREDNISONE 20 MG: 20 TABLET ORAL at 17:00

## 2018-01-25 RX ADMIN — IPRATROPIUM BROMIDE AND ALBUTEROL SULFATE 3 ML: .5; 3 SOLUTION RESPIRATORY (INHALATION) at 07:10

## 2018-01-25 RX ADMIN — IPRATROPIUM BROMIDE AND ALBUTEROL SULFATE 3 ML: .5; 3 SOLUTION RESPIRATORY (INHALATION) at 03:51

## 2018-01-25 RX ADMIN — ARFORMOTEROL TARTRATE 15 MCG: 15 SOLUTION RESPIRATORY (INHALATION) at 10:15

## 2018-01-25 RX ADMIN — SODIUM CHLORIDE SOLN NEBU 7% 4 ML: 7 NEBU SOLN at 19:18

## 2018-01-25 RX ADMIN — IPRATROPIUM BROMIDE AND ALBUTEROL SULFATE 3 ML: .5; 3 SOLUTION RESPIRATORY (INHALATION) at 00:10

## 2018-01-25 RX ADMIN — PHENAZOPYRIDINE HYDROCHLORIDE 200 MG: 200 TABLET, FILM COATED ORAL at 16:54

## 2018-01-25 RX ADMIN — PHENAZOPYRIDINE HYDROCHLORIDE 200 MG: 200 TABLET, FILM COATED ORAL at 23:17

## 2018-01-25 RX ADMIN — DOCUSATE SODIUM -SENNOSIDES 2 TABLET: 50; 8.6 TABLET, COATED ORAL at 09:42

## 2018-01-25 RX ADMIN — IPRATROPIUM BROMIDE AND ALBUTEROL SULFATE 3 ML: .5; 3 SOLUTION RESPIRATORY (INHALATION) at 14:34

## 2018-01-26 LAB
BACTERIA UR QL AUTO: NORMAL /HPF
BILIRUB UR QL STRIP: NEGATIVE
CLARITY UR: ABNORMAL
COLOR UR: ABNORMAL
GLUCOSE UR STRIP-MCNC: NEGATIVE MG/DL
HGB UR QL STRIP.AUTO: NEGATIVE
HYALINE CASTS UR QL AUTO: NORMAL /LPF
KETONES UR QL STRIP: NEGATIVE
LEUKOCYTE ESTERASE UR QL STRIP.AUTO: NEGATIVE
NITRITE UR QL STRIP: POSITIVE
PH UR STRIP.AUTO: 7.5 [PH] (ref 5–8)
PROT UR QL STRIP: NEGATIVE
RBC # UR: NORMAL /HPF
REF LAB TEST METHOD: NORMAL
SP GR UR STRIP: <=1.005 (ref 1–1.03)
SQUAMOUS #/AREA URNS HPF: NORMAL /HPF
UROBILINOGEN UR QL STRIP: ABNORMAL
WBC UR QL AUTO: NORMAL /HPF

## 2018-01-26 PROCEDURE — 63710000001 PREDNISONE PER 1 MG: Performed by: INTERNAL MEDICINE

## 2018-01-26 PROCEDURE — 25010000002 PIPERACILLIN SOD-TAZOBACTAM PER 1 G: Performed by: INTERNAL MEDICINE

## 2018-01-26 PROCEDURE — 87086 URINE CULTURE/COLONY COUNT: CPT | Performed by: HOSPITALIST

## 2018-01-26 PROCEDURE — 94799 UNLISTED PULMONARY SVC/PX: CPT

## 2018-01-26 PROCEDURE — 94760 N-INVAS EAR/PLS OXIMETRY 1: CPT

## 2018-01-26 PROCEDURE — 97110 THERAPEUTIC EXERCISES: CPT

## 2018-01-26 PROCEDURE — 81001 URINALYSIS AUTO W/SCOPE: CPT | Performed by: HOSPITALIST

## 2018-01-26 PROCEDURE — 94668 MNPJ CHEST WALL SBSQ: CPT

## 2018-01-26 RX ADMIN — SODIUM CHLORIDE SOLN NEBU 7% 4 ML: 7 NEBU SOLN at 06:39

## 2018-01-26 RX ADMIN — IPRATROPIUM BROMIDE AND ALBUTEROL SULFATE 3 ML: .5; 3 SOLUTION RESPIRATORY (INHALATION) at 03:20

## 2018-01-26 RX ADMIN — IPRATROPIUM BROMIDE AND ALBUTEROL SULFATE 3 ML: .5; 3 SOLUTION RESPIRATORY (INHALATION) at 11:30

## 2018-01-26 RX ADMIN — DOCUSATE SODIUM -SENNOSIDES 2 TABLET: 50; 8.6 TABLET, COATED ORAL at 09:36

## 2018-01-26 RX ADMIN — METHYLPHENIDATE HYDROCHLORIDE 10 MG: 10 TABLET ORAL at 09:35

## 2018-01-26 RX ADMIN — HYDROCODONE BITARTRATE AND ACETAMINOPHEN 1 TABLET: 7.5; 325 TABLET ORAL at 09:46

## 2018-01-26 RX ADMIN — TAZOBACTAM SODIUM AND PIPERACILLIN SODIUM 3.38 G: 375; 3 INJECTION, SOLUTION INTRAVENOUS at 15:01

## 2018-01-26 RX ADMIN — PHENAZOPYRIDINE HYDROCHLORIDE 200 MG: 200 TABLET, FILM COATED ORAL at 09:35

## 2018-01-26 RX ADMIN — TAMSULOSIN HYDROCHLORIDE 0.8 MG: 0.4 CAPSULE ORAL at 09:35

## 2018-01-26 RX ADMIN — IPRATROPIUM BROMIDE AND ALBUTEROL SULFATE 3 ML: .5; 3 SOLUTION RESPIRATORY (INHALATION) at 14:30

## 2018-01-26 RX ADMIN — SILVER SULFADIAZINE: 10 CREAM TOPICAL at 15:02

## 2018-01-26 RX ADMIN — RIVAROXABAN 10 MG: 10 TABLET, FILM COATED ORAL at 18:27

## 2018-01-26 RX ADMIN — SILVER SULFADIAZINE: 10 CREAM TOPICAL at 21:53

## 2018-01-26 RX ADMIN — PHENAZOPYRIDINE HYDROCHLORIDE 200 MG: 200 TABLET, FILM COATED ORAL at 18:27

## 2018-01-26 RX ADMIN — HYDROCODONE BITARTRATE AND ACETAMINOPHEN 1 TABLET: 7.5; 325 TABLET ORAL at 15:14

## 2018-01-26 RX ADMIN — TAZOBACTAM SODIUM AND PIPERACILLIN SODIUM 3.38 G: 375; 3 INJECTION, SOLUTION INTRAVENOUS at 05:36

## 2018-01-26 RX ADMIN — PREDNISONE 20 MG: 20 TABLET ORAL at 18:27

## 2018-01-26 RX ADMIN — ARFORMOTEROL TARTRATE 15 MCG: 15 SOLUTION RESPIRATORY (INHALATION) at 06:39

## 2018-01-26 RX ADMIN — IPRATROPIUM BROMIDE AND ALBUTEROL SULFATE 3 ML: .5; 3 SOLUTION RESPIRATORY (INHALATION) at 23:13

## 2018-01-26 RX ADMIN — TAZOBACTAM SODIUM AND PIPERACILLIN SODIUM 3.38 G: 375; 3 INJECTION, SOLUTION INTRAVENOUS at 20:44

## 2018-01-26 RX ADMIN — ROPINIROLE HYDROCHLORIDE 0.5 MG: 0.5 TABLET, FILM COATED ORAL at 20:44

## 2018-01-26 RX ADMIN — PHENAZOPYRIDINE HYDROCHLORIDE 200 MG: 200 TABLET, FILM COATED ORAL at 12:36

## 2018-01-26 RX ADMIN — IPRATROPIUM BROMIDE AND ALBUTEROL SULFATE 3 ML: .5; 3 SOLUTION RESPIRATORY (INHALATION) at 20:53

## 2018-01-26 RX ADMIN — SODIUM CHLORIDE SOLN NEBU 7% 4 ML: 7 NEBU SOLN at 20:53

## 2018-01-26 RX ADMIN — PANTOPRAZOLE SODIUM 40 MG: 40 TABLET, DELAYED RELEASE ORAL at 09:36

## 2018-01-26 RX ADMIN — PREDNISONE 20 MG: 20 TABLET ORAL at 09:35

## 2018-01-26 RX ADMIN — FUROSEMIDE 20 MG: 20 TABLET ORAL at 09:36

## 2018-01-26 RX ADMIN — HYDROCODONE BITARTRATE AND ACETAMINOPHEN 1 TABLET: 7.5; 325 TABLET ORAL at 21:53

## 2018-01-27 LAB
ANION GAP SERPL CALCULATED.3IONS-SCNC: 10.5 MMOL/L
BACTERIA SPEC AEROBE CULT: NO GROWTH
BUN BLD-MCNC: 23 MG/DL (ref 8–23)
BUN/CREAT SERPL: 29.9 (ref 7–25)
CALCIUM SPEC-SCNC: 8.6 MG/DL (ref 8.6–10.5)
CHLORIDE SERPL-SCNC: 98 MMOL/L (ref 98–107)
CO2 SERPL-SCNC: 29.5 MMOL/L (ref 22–29)
CREAT BLD-MCNC: 0.77 MG/DL (ref 0.76–1.27)
DEPRECATED RDW RBC AUTO: 48.8 FL (ref 37–54)
ERYTHROCYTE [DISTWIDTH] IN BLOOD BY AUTOMATED COUNT: 13.5 % (ref 11.5–14.5)
GFR SERPL CREATININE-BSD FRML MDRD: 97 ML/MIN/1.73
GLUCOSE BLD-MCNC: 112 MG/DL (ref 65–99)
HCT VFR BLD AUTO: 33.6 % (ref 40.4–52.2)
HGB BLD-MCNC: 10.5 G/DL (ref 13.7–17.6)
MCH RBC QN AUTO: 31 PG (ref 27–32.7)
MCHC RBC AUTO-ENTMCNC: 31.3 G/DL (ref 32.6–36.4)
MCV RBC AUTO: 99.1 FL (ref 79.8–96.2)
PLATELET # BLD AUTO: 309 10*3/MM3 (ref 140–500)
PMV BLD AUTO: 9.2 FL (ref 6–12)
POTASSIUM BLD-SCNC: 4.2 MMOL/L (ref 3.5–5.2)
RBC # BLD AUTO: 3.39 10*6/MM3 (ref 4.6–6)
SODIUM BLD-SCNC: 138 MMOL/L (ref 136–145)
WBC NRBC COR # BLD: 13.72 10*3/MM3 (ref 4.5–10.7)

## 2018-01-27 PROCEDURE — 63710000001 PREDNISONE PER 1 MG: Performed by: INTERNAL MEDICINE

## 2018-01-27 PROCEDURE — 94799 UNLISTED PULMONARY SVC/PX: CPT

## 2018-01-27 PROCEDURE — 93010 ELECTROCARDIOGRAM REPORT: CPT | Performed by: INTERNAL MEDICINE

## 2018-01-27 PROCEDURE — 85027 COMPLETE CBC AUTOMATED: CPT | Performed by: HOSPITALIST

## 2018-01-27 PROCEDURE — 80048 BASIC METABOLIC PNL TOTAL CA: CPT | Performed by: HOSPITALIST

## 2018-01-27 PROCEDURE — 94668 MNPJ CHEST WALL SBSQ: CPT

## 2018-01-27 PROCEDURE — 25010000002 PIPERACILLIN SOD-TAZOBACTAM PER 1 G: Performed by: INTERNAL MEDICINE

## 2018-01-27 PROCEDURE — 93005 ELECTROCARDIOGRAM TRACING: CPT | Performed by: HOSPITALIST

## 2018-01-27 PROCEDURE — 97110 THERAPEUTIC EXERCISES: CPT

## 2018-01-27 RX ORDER — PREDNISONE 20 MG/1
20 TABLET ORAL
Status: COMPLETED | OUTPATIENT
Start: 2018-01-28 | End: 2018-01-30

## 2018-01-27 RX ADMIN — HYDROCODONE BITARTRATE AND ACETAMINOPHEN 1 TABLET: 7.5; 325 TABLET ORAL at 11:57

## 2018-01-27 RX ADMIN — SODIUM CHLORIDE SOLN NEBU 7% 4 ML: 7 NEBU SOLN at 19:58

## 2018-01-27 RX ADMIN — IPRATROPIUM BROMIDE AND ALBUTEROL SULFATE 3 ML: .5; 3 SOLUTION RESPIRATORY (INHALATION) at 03:50

## 2018-01-27 RX ADMIN — IPRATROPIUM BROMIDE AND ALBUTEROL SULFATE 3 ML: .5; 3 SOLUTION RESPIRATORY (INHALATION) at 09:26

## 2018-01-27 RX ADMIN — IPRATROPIUM BROMIDE AND ALBUTEROL SULFATE 3 ML: .5; 3 SOLUTION RESPIRATORY (INHALATION) at 23:36

## 2018-01-27 RX ADMIN — ROPINIROLE HYDROCHLORIDE 0.5 MG: 0.5 TABLET, FILM COATED ORAL at 21:23

## 2018-01-27 RX ADMIN — PHENAZOPYRIDINE HYDROCHLORIDE 200 MG: 200 TABLET, FILM COATED ORAL at 11:49

## 2018-01-27 RX ADMIN — METHYLPHENIDATE HYDROCHLORIDE 10 MG: 10 TABLET ORAL at 09:11

## 2018-01-27 RX ADMIN — TAMSULOSIN HYDROCHLORIDE 0.8 MG: 0.4 CAPSULE ORAL at 09:09

## 2018-01-27 RX ADMIN — PANTOPRAZOLE SODIUM 40 MG: 40 TABLET, DELAYED RELEASE ORAL at 07:46

## 2018-01-27 RX ADMIN — PHENAZOPYRIDINE HYDROCHLORIDE 200 MG: 200 TABLET, FILM COATED ORAL at 09:08

## 2018-01-27 RX ADMIN — IPRATROPIUM BROMIDE AND ALBUTEROL SULFATE 3 ML: .5; 3 SOLUTION RESPIRATORY (INHALATION) at 19:58

## 2018-01-27 RX ADMIN — RIVAROXABAN 10 MG: 10 TABLET, FILM COATED ORAL at 18:59

## 2018-01-27 RX ADMIN — ARFORMOTEROL TARTRATE 15 MCG: 15 SOLUTION RESPIRATORY (INHALATION) at 11:28

## 2018-01-27 RX ADMIN — HYDROCODONE BITARTRATE AND ACETAMINOPHEN 1 TABLET: 7.5; 325 TABLET ORAL at 19:36

## 2018-01-27 RX ADMIN — IPRATROPIUM BROMIDE AND ALBUTEROL SULFATE 3 ML: .5; 3 SOLUTION RESPIRATORY (INHALATION) at 14:36

## 2018-01-27 RX ADMIN — SODIUM CHLORIDE SOLN NEBU 7% 4 ML: 7 NEBU SOLN at 09:26

## 2018-01-27 RX ADMIN — DOCUSATE SODIUM -SENNOSIDES 2 TABLET: 50; 8.6 TABLET, COATED ORAL at 09:09

## 2018-01-27 RX ADMIN — SILVER SULFADIAZINE: 10 CREAM TOPICAL at 21:23

## 2018-01-27 RX ADMIN — TAZOBACTAM SODIUM AND PIPERACILLIN SODIUM 3.38 G: 375; 3 INJECTION, SOLUTION INTRAVENOUS at 05:11

## 2018-01-27 RX ADMIN — FUROSEMIDE 20 MG: 20 TABLET ORAL at 09:09

## 2018-01-27 RX ADMIN — IPRATROPIUM BROMIDE AND ALBUTEROL SULFATE 3 ML: .5; 3 SOLUTION RESPIRATORY (INHALATION) at 16:40

## 2018-01-27 RX ADMIN — PREDNISONE 20 MG: 20 TABLET ORAL at 09:08

## 2018-01-28 ENCOUNTER — APPOINTMENT (OUTPATIENT)
Dept: GENERAL RADIOLOGY | Facility: HOSPITAL | Age: 78
End: 2018-01-28

## 2018-01-28 PROCEDURE — 97110 THERAPEUTIC EXERCISES: CPT

## 2018-01-28 PROCEDURE — 71046 X-RAY EXAM CHEST 2 VIEWS: CPT

## 2018-01-28 PROCEDURE — 94799 UNLISTED PULMONARY SVC/PX: CPT

## 2018-01-28 PROCEDURE — 63710000001 PREDNISONE PER 1 MG: Performed by: INTERNAL MEDICINE

## 2018-01-28 PROCEDURE — 94668 MNPJ CHEST WALL SBSQ: CPT

## 2018-01-28 RX ADMIN — IPRATROPIUM BROMIDE AND ALBUTEROL SULFATE 3 ML: .5; 3 SOLUTION RESPIRATORY (INHALATION) at 19:58

## 2018-01-28 RX ADMIN — ROPINIROLE HYDROCHLORIDE 0.5 MG: 0.5 TABLET, FILM COATED ORAL at 22:54

## 2018-01-28 RX ADMIN — PREDNISONE 20 MG: 20 TABLET ORAL at 10:52

## 2018-01-28 RX ADMIN — PANTOPRAZOLE SODIUM 40 MG: 40 TABLET, DELAYED RELEASE ORAL at 07:39

## 2018-01-28 RX ADMIN — HYDROCODONE BITARTRATE AND ACETAMINOPHEN 1 TABLET: 7.5; 325 TABLET ORAL at 12:34

## 2018-01-28 RX ADMIN — IPRATROPIUM BROMIDE AND ALBUTEROL SULFATE 3 ML: .5; 3 SOLUTION RESPIRATORY (INHALATION) at 23:52

## 2018-01-28 RX ADMIN — TAMSULOSIN HYDROCHLORIDE 0.8 MG: 0.4 CAPSULE ORAL at 10:52

## 2018-01-28 RX ADMIN — IPRATROPIUM BROMIDE AND ALBUTEROL SULFATE 3 ML: .5; 3 SOLUTION RESPIRATORY (INHALATION) at 14:47

## 2018-01-28 RX ADMIN — IPRATROPIUM BROMIDE AND ALBUTEROL SULFATE 3 ML: .5; 3 SOLUTION RESPIRATORY (INHALATION) at 10:47

## 2018-01-28 RX ADMIN — SILVER SULFADIAZINE: 10 CREAM TOPICAL at 10:53

## 2018-01-28 RX ADMIN — RIVAROXABAN 10 MG: 10 TABLET, FILM COATED ORAL at 18:27

## 2018-01-28 RX ADMIN — SILVER SULFADIAZINE 1 APPLICATION: 10 CREAM TOPICAL at 22:54

## 2018-01-28 RX ADMIN — SODIUM CHLORIDE SOLN NEBU 7% 4 ML: 7 NEBU SOLN at 19:58

## 2018-01-28 RX ADMIN — FUROSEMIDE 20 MG: 20 TABLET ORAL at 10:52

## 2018-01-28 RX ADMIN — DOCUSATE SODIUM -SENNOSIDES 2 TABLET: 50; 8.6 TABLET, COATED ORAL at 10:52

## 2018-01-28 RX ADMIN — SODIUM CHLORIDE SOLN NEBU 7% 4 ML: 7 NEBU SOLN at 10:47

## 2018-01-28 RX ADMIN — IPRATROPIUM BROMIDE AND ALBUTEROL SULFATE 3 ML: .5; 3 SOLUTION RESPIRATORY (INHALATION) at 03:39

## 2018-01-28 RX ADMIN — METHYLPHENIDATE HYDROCHLORIDE 10 MG: 10 TABLET ORAL at 10:56

## 2018-01-29 ENCOUNTER — APPOINTMENT (OUTPATIENT)
Dept: CT IMAGING | Facility: HOSPITAL | Age: 78
End: 2018-01-29

## 2018-01-29 PROBLEM — I50.32 CHRONIC DIASTOLIC CHF (CONGESTIVE HEART FAILURE): Status: ACTIVE | Noted: 2018-01-16

## 2018-01-29 PROBLEM — D68.318 HEMORRHAGIC DISORDER DUE TO CIRCULATING ANTICOAGULANTS (HCC): Status: ACTIVE | Noted: 2018-01-29

## 2018-01-29 PROBLEM — T17.500A MUCUS PLUGGING OF BRONCHI: Status: ACTIVE | Noted: 2018-01-16

## 2018-01-29 LAB
ANION GAP SERPL CALCULATED.3IONS-SCNC: 8.7 MMOL/L
BUN BLD-MCNC: 28 MG/DL (ref 8–23)
BUN/CREAT SERPL: 32.6 (ref 7–25)
CALCIUM SPEC-SCNC: 8.6 MG/DL (ref 8.6–10.5)
CHLORIDE SERPL-SCNC: 97 MMOL/L (ref 98–107)
CO2 SERPL-SCNC: 30.3 MMOL/L (ref 22–29)
CREAT BLD-MCNC: 0.86 MG/DL (ref 0.76–1.27)
DEPRECATED RDW RBC AUTO: 49.8 FL (ref 37–54)
ERYTHROCYTE [DISTWIDTH] IN BLOOD BY AUTOMATED COUNT: 13.7 % (ref 11.5–14.5)
GFR SERPL CREATININE-BSD FRML MDRD: 86 ML/MIN/1.73
GLUCOSE BLD-MCNC: 117 MG/DL (ref 65–99)
HCT VFR BLD AUTO: 34.9 % (ref 40.4–52.2)
HGB BLD-MCNC: 11 G/DL (ref 13.7–17.6)
MAGNESIUM SERPL-MCNC: 2.5 MG/DL (ref 1.6–2.4)
MCH RBC QN AUTO: 31.3 PG (ref 27–32.7)
MCHC RBC AUTO-ENTMCNC: 31.5 G/DL (ref 32.6–36.4)
MCV RBC AUTO: 99.4 FL (ref 79.8–96.2)
PLATELET # BLD AUTO: 393 10*3/MM3 (ref 140–500)
PMV BLD AUTO: 9.3 FL (ref 6–12)
POTASSIUM BLD-SCNC: 4.3 MMOL/L (ref 3.5–5.2)
RBC # BLD AUTO: 3.51 10*6/MM3 (ref 4.6–6)
SODIUM BLD-SCNC: 136 MMOL/L (ref 136–145)
WBC NRBC COR # BLD: 12.14 10*3/MM3 (ref 4.5–10.7)

## 2018-01-29 PROCEDURE — 71250 CT THORAX DX C-: CPT

## 2018-01-29 PROCEDURE — 85027 COMPLETE CBC AUTOMATED: CPT | Performed by: HOSPITALIST

## 2018-01-29 PROCEDURE — 94760 N-INVAS EAR/PLS OXIMETRY 1: CPT

## 2018-01-29 PROCEDURE — 97110 THERAPEUTIC EXERCISES: CPT

## 2018-01-29 PROCEDURE — 94799 UNLISTED PULMONARY SVC/PX: CPT

## 2018-01-29 PROCEDURE — 63710000001 PREDNISONE PER 1 MG: Performed by: INTERNAL MEDICINE

## 2018-01-29 PROCEDURE — 80048 BASIC METABOLIC PNL TOTAL CA: CPT | Performed by: HOSPITALIST

## 2018-01-29 PROCEDURE — 83735 ASSAY OF MAGNESIUM: CPT | Performed by: HOSPITALIST

## 2018-01-29 PROCEDURE — 94668 MNPJ CHEST WALL SBSQ: CPT

## 2018-01-29 RX ORDER — PANTOPRAZOLE SODIUM 40 MG/1
40 TABLET, DELAYED RELEASE ORAL DAILY
Qty: 30 TABLET | Refills: 0
Start: 2018-01-29 | End: 2018-04-13 | Stop reason: SDUPTHER

## 2018-01-29 RX ORDER — TIZANIDINE HYDROCHLORIDE 2 MG/1
2 CAPSULE, GELATIN COATED ORAL 3 TIMES DAILY PRN
Start: 2018-01-29 | End: 2018-05-02

## 2018-01-29 RX ORDER — ACETAMINOPHEN 325 MG/1
650 TABLET ORAL EVERY 4 HOURS PRN
Start: 2018-01-29 | End: 2018-03-14

## 2018-01-29 RX ORDER — TAMSULOSIN HYDROCHLORIDE 0.4 MG/1
0.8 CAPSULE ORAL DAILY
Qty: 60 CAPSULE | Refills: 0 | Status: ON HOLD | OUTPATIENT
Start: 2018-01-30 | End: 2019-05-10

## 2018-01-29 RX ORDER — SENNA AND DOCUSATE SODIUM 50; 8.6 MG/1; MG/1
2 TABLET, FILM COATED ORAL NIGHTLY
Start: 2018-01-30 | End: 2018-03-14

## 2018-01-29 RX ORDER — METHYLPHENIDATE HYDROCHLORIDE 10 MG/1
10 TABLET ORAL DAILY
Qty: 5 TABLET | Refills: 0 | Status: SHIPPED | OUTPATIENT
Start: 2018-01-29 | End: 2018-02-01

## 2018-01-29 RX ORDER — HYDROCODONE BITARTRATE AND ACETAMINOPHEN 7.5; 325 MG/1; MG/1
1 TABLET ORAL EVERY 4 HOURS PRN
Qty: 15 TABLET | Refills: 0 | Status: SHIPPED | OUTPATIENT
Start: 2018-01-29 | End: 2018-02-01

## 2018-01-29 RX ADMIN — SILVER SULFADIAZINE: 10 CREAM TOPICAL at 08:33

## 2018-01-29 RX ADMIN — ROPINIROLE HYDROCHLORIDE 0.5 MG: 0.5 TABLET, FILM COATED ORAL at 21:13

## 2018-01-29 RX ADMIN — IPRATROPIUM BROMIDE AND ALBUTEROL SULFATE 3 ML: .5; 3 SOLUTION RESPIRATORY (INHALATION) at 18:28

## 2018-01-29 RX ADMIN — PANTOPRAZOLE SODIUM 40 MG: 40 TABLET, DELAYED RELEASE ORAL at 08:29

## 2018-01-29 RX ADMIN — SODIUM CHLORIDE SOLN NEBU 7% 4 ML: 7 NEBU SOLN at 06:33

## 2018-01-29 RX ADMIN — IPRATROPIUM BROMIDE AND ALBUTEROL SULFATE 3 ML: .5; 3 SOLUTION RESPIRATORY (INHALATION) at 06:32

## 2018-01-29 RX ADMIN — IPRATROPIUM BROMIDE AND ALBUTEROL SULFATE 3 ML: .5; 3 SOLUTION RESPIRATORY (INHALATION) at 23:19

## 2018-01-29 RX ADMIN — FUROSEMIDE 20 MG: 20 TABLET ORAL at 08:29

## 2018-01-29 RX ADMIN — TAMSULOSIN HYDROCHLORIDE 0.8 MG: 0.4 CAPSULE ORAL at 08:28

## 2018-01-29 RX ADMIN — METHYLPHENIDATE HYDROCHLORIDE 10 MG: 10 TABLET ORAL at 08:28

## 2018-01-29 RX ADMIN — IPRATROPIUM BROMIDE AND ALBUTEROL SULFATE 3 ML: .5; 3 SOLUTION RESPIRATORY (INHALATION) at 03:48

## 2018-01-29 RX ADMIN — SODIUM CHLORIDE SOLN NEBU 7% 4 ML: 7 NEBU SOLN at 18:29

## 2018-01-29 RX ADMIN — IPRATROPIUM BROMIDE AND ALBUTEROL SULFATE 3 ML: .5; 3 SOLUTION RESPIRATORY (INHALATION) at 10:19

## 2018-01-29 RX ADMIN — DOCUSATE SODIUM -SENNOSIDES 2 TABLET: 50; 8.6 TABLET, COATED ORAL at 08:28

## 2018-01-29 RX ADMIN — TIZANIDINE 2 MG: 4 TABLET ORAL at 21:13

## 2018-01-29 RX ADMIN — PREDNISONE 20 MG: 20 TABLET ORAL at 08:29

## 2018-01-29 RX ADMIN — IPRATROPIUM BROMIDE AND ALBUTEROL SULFATE 3 ML: .5; 3 SOLUTION RESPIRATORY (INHALATION) at 15:12

## 2018-01-29 RX ADMIN — HYDROCODONE BITARTRATE AND ACETAMINOPHEN 1 TABLET: 7.5; 325 TABLET ORAL at 09:03

## 2018-01-29 RX ADMIN — TIZANIDINE 2 MG: 4 TABLET ORAL at 08:28

## 2018-01-29 RX ADMIN — SILVER SULFADIAZINE 1 APPLICATION: 10 CREAM TOPICAL at 21:52

## 2018-01-30 ENCOUNTER — ANESTHESIA EVENT (OUTPATIENT)
Dept: GASTROENTEROLOGY | Facility: HOSPITAL | Age: 78
End: 2018-01-30

## 2018-01-30 ENCOUNTER — ANESTHESIA (OUTPATIENT)
Dept: GASTROENTEROLOGY | Facility: HOSPITAL | Age: 78
End: 2018-01-30

## 2018-01-30 LAB
APPEARANCE FLD: ABNORMAL
APPEARANCE FLD: ABNORMAL
COLOR FLD: ABNORMAL
COLOR FLD: COLORLESS
EOSINOPHIL NFR FLD MANUAL: 3 %
LYMPHOCYTES NFR FLD MANUAL: 11 %
MONOCYTES NFR FLD: 3 %
MONOCYTES NFR FLD: 5 %
MONOS+MACROS NFR FLD: 22 %
MONOS+MACROS NFR FLD: 77 %
NEUTROPHILS NFR FLD MANUAL: 7 %
NEUTROPHILS NFR FLD MANUAL: 72 %
RBC # FLD AUTO: 42 /MM3
RBC # FLD AUTO: 62 /MM3
WBC # FLD: 165 /MM3
WBC # FLD: 60 /MM3

## 2018-01-30 PROCEDURE — 87071 CULTURE AEROBIC QUANT OTHER: CPT | Performed by: INTERNAL MEDICINE

## 2018-01-30 PROCEDURE — 94799 UNLISTED PULMONARY SVC/PX: CPT

## 2018-01-30 PROCEDURE — 63710000001 PREDNISONE PER 1 MG: Performed by: INTERNAL MEDICINE

## 2018-01-30 PROCEDURE — 88104 CYTOPATH FL NONGYN SMEARS: CPT | Performed by: INTERNAL MEDICINE

## 2018-01-30 PROCEDURE — 89051 BODY FLUID CELL COUNT: CPT | Performed by: INTERNAL MEDICINE

## 2018-01-30 PROCEDURE — 94668 MNPJ CHEST WALL SBSQ: CPT

## 2018-01-30 PROCEDURE — 87070 CULTURE OTHR SPECIMN AEROBIC: CPT | Performed by: INTERNAL MEDICINE

## 2018-01-30 PROCEDURE — 0B9C8ZX DRAINAGE OF RIGHT UPPER LUNG LOBE, VIA NATURAL OR ARTIFICIAL OPENING ENDOSCOPIC, DIAGNOSTIC: ICD-10-PCS | Performed by: INTERNAL MEDICINE

## 2018-01-30 PROCEDURE — 87205 SMEAR GRAM STAIN: CPT | Performed by: INTERNAL MEDICINE

## 2018-01-30 PROCEDURE — 97110 THERAPEUTIC EXERCISES: CPT

## 2018-01-30 PROCEDURE — 88305 TISSUE EXAM BY PATHOLOGIST: CPT | Performed by: INTERNAL MEDICINE

## 2018-01-30 PROCEDURE — 25010000002 PROPOFOL 10 MG/ML EMULSION: Performed by: ANESTHESIOLOGY

## 2018-01-30 RX ORDER — SODIUM CHLORIDE FOR INHALATION 7 %
4 VIAL, NEBULIZER (ML) INHALATION
Status: DISCONTINUED | OUTPATIENT
Start: 2018-01-30 | End: 2018-02-01 | Stop reason: HOSPADM

## 2018-01-30 RX ORDER — LIDOCAINE HYDROCHLORIDE 20 MG/ML
INJECTION, SOLUTION INFILTRATION; PERINEURAL AS NEEDED
Status: DISCONTINUED | OUTPATIENT
Start: 2018-01-30 | End: 2018-01-30 | Stop reason: SURG

## 2018-01-30 RX ORDER — LIDOCAINE HYDROCHLORIDE 20 MG/ML
INJECTION, SOLUTION EPIDURAL; INFILTRATION; INTRACAUDAL; PERINEURAL AS NEEDED
Status: DISCONTINUED | OUTPATIENT
Start: 2018-01-30 | End: 2018-01-30 | Stop reason: HOSPADM

## 2018-01-30 RX ORDER — PROPOFOL 10 MG/ML
VIAL (ML) INTRAVENOUS CONTINUOUS PRN
Status: DISCONTINUED | OUTPATIENT
Start: 2018-01-30 | End: 2018-01-30 | Stop reason: SURG

## 2018-01-30 RX ORDER — SODIUM CHLORIDE, SODIUM LACTATE, POTASSIUM CHLORIDE, CALCIUM CHLORIDE 600; 310; 30; 20 MG/100ML; MG/100ML; MG/100ML; MG/100ML
30 INJECTION, SOLUTION INTRAVENOUS CONTINUOUS PRN
Status: DISCONTINUED | OUTPATIENT
Start: 2018-01-30 | End: 2018-02-01 | Stop reason: HOSPADM

## 2018-01-30 RX ORDER — LIDOCAINE HYDROCHLORIDE 10 MG/ML
INJECTION, SOLUTION EPIDURAL; INFILTRATION; INTRACAUDAL; PERINEURAL AS NEEDED
Status: DISCONTINUED | OUTPATIENT
Start: 2018-01-30 | End: 2018-01-30 | Stop reason: HOSPADM

## 2018-01-30 RX ORDER — IPRATROPIUM BROMIDE AND ALBUTEROL SULFATE 2.5; .5 MG/3ML; MG/3ML
3 SOLUTION RESPIRATORY (INHALATION)
Status: DISCONTINUED | OUTPATIENT
Start: 2018-01-30 | End: 2018-02-01 | Stop reason: HOSPADM

## 2018-01-30 RX ORDER — SODIUM CHLORIDE 0.9 % (FLUSH) 0.9 %
1-10 SYRINGE (ML) INJECTION AS NEEDED
Status: DISCONTINUED | OUTPATIENT
Start: 2018-01-30 | End: 2018-01-30

## 2018-01-30 RX ORDER — PROPOFOL 10 MG/ML
VIAL (ML) INTRAVENOUS AS NEEDED
Status: DISCONTINUED | OUTPATIENT
Start: 2018-01-30 | End: 2018-01-30 | Stop reason: SURG

## 2018-01-30 RX ADMIN — ROPINIROLE HYDROCHLORIDE 0.5 MG: 0.5 TABLET, FILM COATED ORAL at 23:19

## 2018-01-30 RX ADMIN — IPRATROPIUM BROMIDE AND ALBUTEROL SULFATE 3 ML: .5; 3 SOLUTION RESPIRATORY (INHALATION) at 06:44

## 2018-01-30 RX ADMIN — SODIUM CHLORIDE, POTASSIUM CHLORIDE, SODIUM LACTATE AND CALCIUM CHLORIDE 30 ML/HR: 600; 310; 30; 20 INJECTION, SOLUTION INTRAVENOUS at 09:00

## 2018-01-30 RX ADMIN — SODIUM CHLORIDE, POTASSIUM CHLORIDE, SODIUM LACTATE AND CALCIUM CHLORIDE: 600; 310; 30; 20 INJECTION, SOLUTION INTRAVENOUS at 09:11

## 2018-01-30 RX ADMIN — SODIUM CHLORIDE SOLN NEBU 7% 4 ML: 7 NEBU SOLN at 14:55

## 2018-01-30 RX ADMIN — METHYLPHENIDATE HYDROCHLORIDE 10 MG: 10 TABLET ORAL at 11:37

## 2018-01-30 RX ADMIN — IPRATROPIUM BROMIDE AND ALBUTEROL SULFATE 3 ML: .5; 3 SOLUTION RESPIRATORY (INHALATION) at 14:55

## 2018-01-30 RX ADMIN — PREDNISONE 20 MG: 20 TABLET ORAL at 11:38

## 2018-01-30 RX ADMIN — LIDOCAINE HYDROCHLORIDE 100 MG: 20 INJECTION, SOLUTION INFILTRATION; PERINEURAL at 09:20

## 2018-01-30 RX ADMIN — TAMSULOSIN HYDROCHLORIDE 0.8 MG: 0.4 CAPSULE ORAL at 11:37

## 2018-01-30 RX ADMIN — SILVER SULFADIAZINE: 10 CREAM TOPICAL at 13:04

## 2018-01-30 RX ADMIN — TIZANIDINE 2 MG: 4 TABLET ORAL at 11:37

## 2018-01-30 RX ADMIN — PROPOFOL 100 MG: 10 INJECTION, EMULSION INTRAVENOUS at 09:25

## 2018-01-30 RX ADMIN — PROPOFOL 100 MCG/KG/MIN: 10 INJECTION, EMULSION INTRAVENOUS at 09:20

## 2018-01-30 RX ADMIN — SODIUM CHLORIDE SOLN NEBU 7% 4 ML: 7 NEBU SOLN at 20:17

## 2018-01-30 RX ADMIN — SODIUM CHLORIDE SOLN NEBU 7% 4 ML: 7 NEBU SOLN at 06:44

## 2018-01-30 RX ADMIN — SODIUM CHLORIDE SOLN NEBU 7% 4 ML: 7 NEBU SOLN at 12:08

## 2018-01-30 RX ADMIN — IPRATROPIUM BROMIDE AND ALBUTEROL SULFATE 3 ML: .5; 3 SOLUTION RESPIRATORY (INHALATION) at 03:24

## 2018-01-30 RX ADMIN — DOCUSATE SODIUM -SENNOSIDES 2 TABLET: 50; 8.6 TABLET, COATED ORAL at 11:38

## 2018-01-30 RX ADMIN — PANTOPRAZOLE SODIUM 40 MG: 40 TABLET, DELAYED RELEASE ORAL at 11:39

## 2018-01-30 RX ADMIN — APIXABAN 2.5 MG: 2.5 TABLET, FILM COATED ORAL at 23:19

## 2018-01-30 RX ADMIN — MAGNESIUM HYDROXIDE 10 ML: 2400 SUSPENSION ORAL at 13:04

## 2018-01-30 RX ADMIN — IPRATROPIUM BROMIDE AND ALBUTEROL SULFATE 3 ML: .5; 3 SOLUTION RESPIRATORY (INHALATION) at 20:17

## 2018-01-30 RX ADMIN — IPRATROPIUM BROMIDE AND ALBUTEROL SULFATE 3 ML: .5; 3 SOLUTION RESPIRATORY (INHALATION) at 12:08

## 2018-01-30 RX ADMIN — FUROSEMIDE 20 MG: 20 TABLET ORAL at 11:38

## 2018-01-30 RX ADMIN — SILVER SULFADIAZINE 1 APPLICATION: 10 CREAM TOPICAL at 23:20

## 2018-01-30 RX ADMIN — HYDROCODONE BITARTRATE AND ACETAMINOPHEN 1 TABLET: 7.5; 325 TABLET ORAL at 11:37

## 2018-01-30 RX ADMIN — PROPOFOL 100 MG: 10 INJECTION, EMULSION INTRAVENOUS at 09:20

## 2018-01-30 NOTE — ANESTHESIA PREPROCEDURE EVALUATION
Anesthesia Evaluation     Patient summary reviewed and Nursing notes reviewed   NPO Solid Status: > 8 hours  NPO Liquid Status: > 8 hours     Airway   Mallampati: II  Neck ROM: full  no difficulty expected  Dental - normal exam     Pulmonary     breath sounds clear to auscultation  (+) pneumonia , COPD,   Cardiovascular     Rhythm: regular    (+) CHF,       Neuro/Psych  (+) psychiatric history,     GI/Hepatic/Renal/Endo    (+)  GERD,     Musculoskeletal     Abdominal    Substance History      OB/GYN          Other   (+) arthritis                                             Anesthesia Plan    ASA 3     MAC     intravenous induction   Anesthetic plan and risks discussed with patient.

## 2018-01-30 NOTE — ANESTHESIA POSTPROCEDURE EVALUATION
"Patient: Tony Leonard    Procedure Summary     Date Anesthesia Start Anesthesia Stop Room / Location    01/30/18 0911 0950  [facility]       Procedure Diagnosis Surgeon Provider    BRONCHOSCOPY with BAL and washing (N/A Bronchus) Mucus plugging of bronchi  (Mucus plugging of bronchi [J98.09]) MD Juliano Johnson MD          Anesthesia Type: MAC  Last vitals  BP   112/61 (01/30/18 1010)   Temp   36.4 °C (97.6 °F) (01/30/18 1007)   Pulse   97 (01/30/18 1010)   Resp   21 (01/30/18 1010)     SpO2   96 % (01/30/18 1010)     Post Anesthesia Care and Evaluation    Patient location during evaluation: bedside  Patient participation: complete - patient participated  Level of consciousness: sleepy but conscious  Pain score: 0  Pain management: adequate  Airway patency: patent  Anesthetic complications: No anesthetic complications    Cardiovascular status: acceptable  Respiratory status: acceptable  Hydration status: acceptable    Comments: /61 (BP Location: Left arm, Patient Position: Sitting)  Pulse 97  Temp 36.4 °C (97.6 °F) (Oral)   Resp 21  Ht 180.3 cm (70.98\")  Wt 74.6 kg (164 lb 6.4 oz)  SpO2 96%  BMI 22.94 kg/m2        "

## 2018-01-30 NOTE — ANESTHESIA PROCEDURE NOTES
Airway  Urgency: elective    Date/Time: 1/30/2018 9:20 AM  End Time:1/30/2018 9:20 AM    General Information and Staff    Patient location during procedure: OR  Anesthesiologist: IMELDA TRINIDAD    Indications and Patient Condition  Indications for airway management: airway protection    Preoxygenated: yes  Mask difficulty assessment: 0 - not attempted    Final Airway Details  Final airway type: supraglottic airway      Successful airway: classic  Size 5    Number of attempts at approach: 1

## 2018-01-31 LAB
ANION GAP SERPL CALCULATED.3IONS-SCNC: 10.5 MMOL/L
BUN BLD-MCNC: 25 MG/DL (ref 8–23)
BUN/CREAT SERPL: 30.9 (ref 7–25)
CALCIUM SPEC-SCNC: 8.5 MG/DL (ref 8.6–10.5)
CHLORIDE SERPL-SCNC: 99 MMOL/L (ref 98–107)
CO2 SERPL-SCNC: 29.5 MMOL/L (ref 22–29)
CREAT BLD-MCNC: 0.81 MG/DL (ref 0.76–1.27)
CYTO UR: NORMAL
DEPRECATED RDW RBC AUTO: 49.1 FL (ref 37–54)
ERYTHROCYTE [DISTWIDTH] IN BLOOD BY AUTOMATED COUNT: 13.3 % (ref 11.5–14.5)
GFR SERPL CREATININE-BSD FRML MDRD: 92 ML/MIN/1.73
GLUCOSE BLD-MCNC: 114 MG/DL (ref 65–99)
GLUCOSE BLDC GLUCOMTR-MCNC: 95 MG/DL (ref 70–130)
HCT VFR BLD AUTO: 35 % (ref 40.4–52.2)
HGB BLD-MCNC: 10.8 G/DL (ref 13.7–17.6)
LAB AP CASE REPORT: NORMAL
Lab: NORMAL
MCH RBC QN AUTO: 31.3 PG (ref 27–32.7)
MCHC RBC AUTO-ENTMCNC: 30.9 G/DL (ref 32.6–36.4)
MCV RBC AUTO: 101.4 FL (ref 79.8–96.2)
PATH REPORT.FINAL DX SPEC: NORMAL
PATH REPORT.GROSS SPEC: NORMAL
PLATELET # BLD AUTO: 346 10*3/MM3 (ref 140–500)
PMV BLD AUTO: 9.3 FL (ref 6–12)
POTASSIUM BLD-SCNC: 4.2 MMOL/L (ref 3.5–5.2)
RBC # BLD AUTO: 3.45 10*6/MM3 (ref 4.6–6)
SODIUM BLD-SCNC: 139 MMOL/L (ref 136–145)
WBC NRBC COR # BLD: 11.22 10*3/MM3 (ref 4.5–10.7)

## 2018-01-31 PROCEDURE — 94668 MNPJ CHEST WALL SBSQ: CPT

## 2018-01-31 PROCEDURE — 97530 THERAPEUTIC ACTIVITIES: CPT

## 2018-01-31 PROCEDURE — 94799 UNLISTED PULMONARY SVC/PX: CPT

## 2018-01-31 PROCEDURE — 82962 GLUCOSE BLOOD TEST: CPT

## 2018-01-31 PROCEDURE — 80048 BASIC METABOLIC PNL TOTAL CA: CPT | Performed by: INTERNAL MEDICINE

## 2018-01-31 PROCEDURE — 85027 COMPLETE CBC AUTOMATED: CPT | Performed by: INTERNAL MEDICINE

## 2018-01-31 RX ORDER — MECLIZINE HYDROCHLORIDE 25 MG/1
25 TABLET ORAL 3 TIMES DAILY PRN
Status: DISCONTINUED | OUTPATIENT
Start: 2018-01-31 | End: 2018-02-01 | Stop reason: HOSPADM

## 2018-01-31 RX ADMIN — SILVER SULFADIAZINE: 10 CREAM TOPICAL at 22:31

## 2018-01-31 RX ADMIN — APIXABAN 2.5 MG: 2.5 TABLET, FILM COATED ORAL at 22:30

## 2018-01-31 RX ADMIN — IPRATROPIUM BROMIDE AND ALBUTEROL SULFATE 3 ML: .5; 3 SOLUTION RESPIRATORY (INHALATION) at 08:14

## 2018-01-31 RX ADMIN — TAMSULOSIN HYDROCHLORIDE 0.8 MG: 0.4 CAPSULE ORAL at 09:37

## 2018-01-31 RX ADMIN — SODIUM CHLORIDE SOLN NEBU 7% 4 ML: 7 NEBU SOLN at 11:25

## 2018-01-31 RX ADMIN — SODIUM CHLORIDE SOLN NEBU 7% 4 ML: 7 NEBU SOLN at 08:14

## 2018-01-31 RX ADMIN — APIXABAN 2.5 MG: 2.5 TABLET, FILM COATED ORAL at 09:37

## 2018-01-31 RX ADMIN — PANTOPRAZOLE SODIUM 40 MG: 40 TABLET, DELAYED RELEASE ORAL at 06:09

## 2018-01-31 RX ADMIN — IPRATROPIUM BROMIDE AND ALBUTEROL SULFATE 3 ML: .5; 3 SOLUTION RESPIRATORY (INHALATION) at 19:58

## 2018-01-31 RX ADMIN — METHYLPHENIDATE HYDROCHLORIDE 10 MG: 10 TABLET ORAL at 09:37

## 2018-01-31 RX ADMIN — IPRATROPIUM BROMIDE AND ALBUTEROL SULFATE 3 ML: .5; 3 SOLUTION RESPIRATORY (INHALATION) at 15:35

## 2018-01-31 RX ADMIN — SODIUM CHLORIDE SOLN NEBU 7% 4 ML: 7 NEBU SOLN at 19:58

## 2018-01-31 RX ADMIN — SILVER SULFADIAZINE: 10 CREAM TOPICAL at 09:39

## 2018-01-31 RX ADMIN — SODIUM CHLORIDE SOLN NEBU 7% 4 ML: 7 NEBU SOLN at 15:35

## 2018-01-31 RX ADMIN — FUROSEMIDE 20 MG: 20 TABLET ORAL at 09:37

## 2018-01-31 RX ADMIN — DOCUSATE SODIUM -SENNOSIDES 2 TABLET: 50; 8.6 TABLET, COATED ORAL at 09:38

## 2018-01-31 RX ADMIN — MECLIZINE 25 MG: 25 TABLET ORAL at 22:30

## 2018-01-31 RX ADMIN — ROPINIROLE HYDROCHLORIDE 0.5 MG: 0.5 TABLET, FILM COATED ORAL at 22:30

## 2018-01-31 RX ADMIN — IPRATROPIUM BROMIDE AND ALBUTEROL SULFATE 3 ML: .5; 3 SOLUTION RESPIRATORY (INHALATION) at 11:25

## 2018-02-01 ENCOUNTER — PATIENT OUTREACH (OUTPATIENT)
Dept: CASE MANAGEMENT | Facility: OTHER | Age: 78
End: 2018-02-01

## 2018-02-01 VITALS
SYSTOLIC BLOOD PRESSURE: 113 MMHG | DIASTOLIC BLOOD PRESSURE: 44 MMHG | RESPIRATION RATE: 18 BRPM | OXYGEN SATURATION: 96 % | BODY MASS INDEX: 22.96 KG/M2 | HEART RATE: 95 BPM | HEIGHT: 71 IN | TEMPERATURE: 98.2 F | WEIGHT: 164 LBS

## 2018-02-01 LAB
BACTERIA SPEC AEROBE CULT: NORMAL
BACTERIA SPEC AEROBE CULT: NORMAL
BACTERIA SPEC RESP CULT: NORMAL
BACTERIA SPEC RESP CULT: NORMAL
DEPRECATED RDW RBC AUTO: 48.9 FL (ref 37–54)
ERYTHROCYTE [DISTWIDTH] IN BLOOD BY AUTOMATED COUNT: 13.3 % (ref 11.5–14.5)
GRAM STN SPEC: NORMAL
HCT VFR BLD AUTO: 34.2 % (ref 40.4–52.2)
HGB BLD-MCNC: 10.7 G/DL (ref 13.7–17.6)
MCH RBC QN AUTO: 31.6 PG (ref 27–32.7)
MCHC RBC AUTO-ENTMCNC: 31.3 G/DL (ref 32.6–36.4)
MCV RBC AUTO: 100.9 FL (ref 79.8–96.2)
PLATELET # BLD AUTO: 336 10*3/MM3 (ref 140–500)
PMV BLD AUTO: 9.1 FL (ref 6–12)
RBC # BLD AUTO: 3.39 10*6/MM3 (ref 4.6–6)
WBC NRBC COR # BLD: 8.65 10*3/MM3 (ref 4.5–10.7)

## 2018-02-01 PROCEDURE — 85027 COMPLETE CBC AUTOMATED: CPT | Performed by: INTERNAL MEDICINE

## 2018-02-01 PROCEDURE — 94799 UNLISTED PULMONARY SVC/PX: CPT

## 2018-02-01 PROCEDURE — 97110 THERAPEUTIC EXERCISES: CPT

## 2018-02-01 PROCEDURE — 94668 MNPJ CHEST WALL SBSQ: CPT

## 2018-02-01 RX ORDER — HYDROCODONE BITARTRATE AND ACETAMINOPHEN 7.5; 325 MG/1; MG/1
1 TABLET ORAL EVERY 4 HOURS PRN
Qty: 15 TABLET | Refills: 0 | Status: SHIPPED | OUTPATIENT
Start: 2018-02-01 | End: 2018-03-30 | Stop reason: SDUPTHER

## 2018-02-01 RX ORDER — IPRATROPIUM BROMIDE AND ALBUTEROL SULFATE 2.5; .5 MG/3ML; MG/3ML
3 SOLUTION RESPIRATORY (INHALATION)
Qty: 360 ML
Start: 2018-02-01 | End: 2018-04-25 | Stop reason: HOSPADM

## 2018-02-01 RX ORDER — METHYLPHENIDATE HYDROCHLORIDE 10 MG/1
10 TABLET ORAL DAILY
Qty: 5 TABLET | Refills: 0 | Status: SHIPPED | OUTPATIENT
Start: 2018-02-01 | End: 2018-03-09 | Stop reason: SDUPTHER

## 2018-02-01 RX ADMIN — SODIUM CHLORIDE SOLN NEBU 7% 4 ML: 7 NEBU SOLN at 21:06

## 2018-02-01 RX ADMIN — DOCUSATE SODIUM -SENNOSIDES 2 TABLET: 50; 8.6 TABLET, COATED ORAL at 09:00

## 2018-02-01 RX ADMIN — PANTOPRAZOLE SODIUM 40 MG: 40 TABLET, DELAYED RELEASE ORAL at 06:18

## 2018-02-01 RX ADMIN — METHYLPHENIDATE HYDROCHLORIDE 10 MG: 10 TABLET ORAL at 09:08

## 2018-02-01 RX ADMIN — APIXABAN 2.5 MG: 2.5 TABLET, FILM COATED ORAL at 20:50

## 2018-02-01 RX ADMIN — IPRATROPIUM BROMIDE AND ALBUTEROL SULFATE 3 ML: .5; 3 SOLUTION RESPIRATORY (INHALATION) at 15:06

## 2018-02-01 RX ADMIN — FUROSEMIDE 20 MG: 20 TABLET ORAL at 09:00

## 2018-02-01 RX ADMIN — MECLIZINE 25 MG: 25 TABLET ORAL at 09:00

## 2018-02-01 RX ADMIN — IPRATROPIUM BROMIDE AND ALBUTEROL SULFATE 3 ML: .5; 3 SOLUTION RESPIRATORY (INHALATION) at 11:50

## 2018-02-01 RX ADMIN — APIXABAN 2.5 MG: 2.5 TABLET, FILM COATED ORAL at 09:00

## 2018-02-01 RX ADMIN — IPRATROPIUM BROMIDE AND ALBUTEROL SULFATE 3 ML: .5; 3 SOLUTION RESPIRATORY (INHALATION) at 21:06

## 2018-02-01 RX ADMIN — SODIUM CHLORIDE SOLN NEBU 7% 4 ML: 7 NEBU SOLN at 15:06

## 2018-02-01 RX ADMIN — IPRATROPIUM BROMIDE AND ALBUTEROL SULFATE 3 ML: .5; 3 SOLUTION RESPIRATORY (INHALATION) at 07:45

## 2018-02-01 RX ADMIN — ROPINIROLE HYDROCHLORIDE 0.5 MG: 0.5 TABLET, FILM COATED ORAL at 20:50

## 2018-02-01 RX ADMIN — TAMSULOSIN HYDROCHLORIDE 0.8 MG: 0.4 CAPSULE ORAL at 09:00

## 2018-02-01 RX ADMIN — SODIUM CHLORIDE SOLN NEBU 7% 4 ML: 7 NEBU SOLN at 11:50

## 2018-02-01 RX ADMIN — SILVER SULFADIAZINE: 10 CREAM TOPICAL at 09:00

## 2018-02-01 RX ADMIN — SODIUM CHLORIDE SOLN NEBU 7% 4 ML: 7 NEBU SOLN at 07:45

## 2018-02-01 NOTE — OUTREACH NOTE
Skilled Nursing Facility Discharge Flowsheet:     Skilled Nursing Facility Discharge Assessment 2/1/2018   Acute Facility Discharged From [facility]   Acute Discharge Date 2/1/2018   Name of the Skilled Nursing Facility? [facility]   Tier Level of the Skilled Nursing Facility 2   Purpose of SNF Admission PT;OT;SN;WC   Estimated length of stay for the patient? To be determined   Who is the insurance provider or payor of patient stay? Medicare

## 2018-02-07 ENCOUNTER — PATIENT OUTREACH (OUTPATIENT)
Dept: CASE MANAGEMENT | Facility: OTHER | Age: 78
End: 2018-02-07

## 2018-02-07 NOTE — OUTREACH NOTE
Skilled Nursing Facility Discharge Flowsheet:     Skilled Nursing Facility Discharge Assessment 2/7/2018   Acute Facility Discharged From [facility]   Acute Discharge Date 2/1/2018   Name of the Skilled Nursing Facility? [facility]   Tier Level of the Skilled Nursing Facility 2   Purpose of SNF Admission -   Estimated length of stay for the patient? To be determined   Who is the insurance provider or payor of patient stay? Medicare   Progression of Patient? Continues with therapy- patient was not discharged 2/6/18   Skilled Nursing Discharge Date? (No Data)   Where was the patient discharged to? (No Data)   Home Health Agency at discharge? (No Data)   Name of Home Health Agency? -   DME Needed at Discharge? (No Data)   Are there any medication concerns at Discharge (No Data)   Does the patient have a follow-up appointment? No   Comments Regarding F/U Appt. ? -

## 2018-02-07 NOTE — OUTREACH NOTE
Skilled Nursing Facility Discharge Flowsheet:     Skilled Nursing Facility Discharge Assessment 2/7/2018   Acute Facility Discharged From [facility]   Acute Discharge Date 2/1/2018   Name of the Skilled Nursing Facility? [facility] Level of the Skilled Nursing Facility 2   Purpose of SNF Admission PT;OT;SN;WC   Estimated length of stay for the patient? -   Who is the insurance provider or payor of patient stay? Medicare   Skilled Nursing Discharge Date? 2/6/2018   Where was the patient discharged to? Home   Home Health Agency at discharge? Yes   Name of Home Health Agency? [facility]   DME Needed at Discharge? No   Are there any medication concerns at Discharge No   Does the patient have a follow-up appointment? Yes   Comments Regarding F/U Appt. ? 2/20/18 with Ortho

## 2018-02-14 ENCOUNTER — PATIENT OUTREACH (OUTPATIENT)
Dept: CASE MANAGEMENT | Facility: OTHER | Age: 78
End: 2018-02-14

## 2018-02-14 ENCOUNTER — TELEPHONE (OUTPATIENT)
Dept: ORTHOPEDIC SURGERY | Facility: CLINIC | Age: 78
End: 2018-02-14

## 2018-02-14 NOTE — OUTREACH NOTE
Skilled Nursing Facility Discharge Flowsheet:     Skilled Nursing Facility Discharge Assessment 2/14/2018   Acute Facility Discharged From [facility]   Acute Discharge Date 2/1/2018   Name of the Skilled Nursing Facility? [facility] Level of the Skilled Nursing Facility 2   Purpose of SNF Admission PT;OT;SN;WC   Estimated length of stay for the patient? Plan discharge 2/16/18   Who is the insurance provider or payor of patient stay? Medicare   Progression of Patient? -   Skilled Nursing Discharge Date? 2/16/2018   Where was the patient discharged to? Home   Home Health Agency at discharge? Yes   Name of Home Health Agency? [facility]   DME Needed at Discharge? No   Are there any medication concerns at Discharge No   Does the patient have a follow-up appointment? Yes   Comments Regarding F/U Appt. ? 2/20/18 with ortho

## 2018-02-19 ENCOUNTER — PATIENT OUTREACH (OUTPATIENT)
Dept: CASE MANAGEMENT | Facility: OTHER | Age: 78
End: 2018-02-19

## 2018-02-20 ENCOUNTER — TELEPHONE (OUTPATIENT)
Dept: ORTHOPEDIC SURGERY | Facility: CLINIC | Age: 78
End: 2018-02-20

## 2018-02-22 ENCOUNTER — OFFICE VISIT (OUTPATIENT)
Dept: ORTHOPEDIC SURGERY | Facility: CLINIC | Age: 78
End: 2018-02-22

## 2018-02-22 VITALS — BODY MASS INDEX: 22.96 KG/M2 | HEIGHT: 71 IN | WEIGHT: 164 LBS | TEMPERATURE: 99.3 F

## 2018-02-22 DIAGNOSIS — Z87.81 HISTORY OF FRACTURE OF RIGHT HIP: Primary | ICD-10-CM

## 2018-02-22 PROCEDURE — 99024 POSTOP FOLLOW-UP VISIT: CPT | Performed by: NURSE PRACTITIONER

## 2018-02-22 PROCEDURE — 73560 X-RAY EXAM OF KNEE 1 OR 2: CPT | Performed by: NURSE PRACTITIONER

## 2018-02-22 PROCEDURE — 73502 X-RAY EXAM HIP UNI 2-3 VIEWS: CPT | Performed by: NURSE PRACTITIONER

## 2018-02-22 RX ORDER — SODIUM CHLORIDE FOR INHALATION 7 %
4 VIAL, NEBULIZER (ML) INHALATION AS NEEDED
COMMUNITY
Start: 2018-02-17 | End: 2022-12-01 | Stop reason: HOSPADM

## 2018-02-22 RX ORDER — CLOTRIMAZOLE AND BETAMETHASONE DIPROPIONATE 10; .64 MG/G; MG/G
CREAM TOPICAL
COMMUNITY
Start: 2018-02-16 | End: 2019-02-07 | Stop reason: ALTCHOICE

## 2018-02-22 NOTE — PROGRESS NOTES
Tony Leonard : 1940 MRN: [id number] DATE: 2018  Body mass index is 22.87 kg/(m^2).  Vitals:    18 1115   Temp: 99.3 °F (37.4 °C)       DIAGNOSIS: 1 month follow up right hip fracture with ORIF with intermedullary device    SUBJECTIVE:Patient returns today for follow up of hip fracture with ORIF with intermedullary device. Patient reports doing well with no unusual complaints. Appears to be progressing appropriately.    OBJECTIVE:   Exam:. The incision is healing appropriately. No sign of infection. Range of motion is progressing as expected. The calf is soft and nontender with a negative Homans sign.    DIAGNOSTIC STUDIES  Xrays: 2 views of the right hip and corresponding knee (AP full length and lateral) were ordered and reviewed for evaluation of recent hip fracture with ORIF with intermedullary device. They demonstrate a well positioned, well aligned hardware without migration or complicating factors noted. In comparison with previous films done in the hospital post op, there has been no postition change.    ASSESSMENT: status post right hip fracture with ORIF with intermedullary device expected healing.    PLAN: 1) Continue with PT exercises as prescribed   2) Follow up if complications arise in the future.       Pura Shultz, APRN  2018

## 2018-02-22 NOTE — PATIENT INSTRUCTIONS
Back Exercises  If you have pain in your back, do these exercises 2-3 times each day or as told by your doctor. When the pain goes away, do the exercises once each day, but repeat the steps more times for each exercise (do more repetitions). If you do not have pain in your back, do these exercises once each day or as told by your doctor.  Exercises  Single Knee to Chest     Do these steps 3-5 times in a row for each le. Lie on your back on a firm bed or the floor with your legs stretched out.  2. Bring one knee to your chest.  3. Hold your knee to your chest by grabbing your knee or thigh.  4. Pull on your knee until you feel a gentle stretch in your lower back.  5. Keep doing the stretch for 10-30 seconds.  6. Slowly let go of your leg and straighten it.  Pelvic Tilt     Do these steps 5-10 times in a row:  1. Lie on your back on a firm bed or the floor with your legs stretched out.  2. Bend your knees so they point up to the ceiling. Your feet should be flat on the floor.  3. Tighten your lower belly (abdomen) muscles to press your lower back against the floor. This will make your tailbone point up to the ceiling instead of pointing down to your feet or the floor.  4. Stay in this position for 5-10 seconds while you gently tighten your muscles and breathe evenly.  Cat-Cow     Do these steps until your lower back bends more easily:  1. Get on your hands and knees on a firm surface. Keep your hands under your shoulders, and keep your knees under your hips. You may put padding under your knees.  2. Let your head hang down, and make your tailbone point down to the floor so your lower back is round like the back of a cat.  3. Stay in this position for 5 seconds.  4. Slowly lift your head and make your tailbone point up to the ceiling so your back hangs low (sags) like the back of a cow.  5. Stay in this position for 5 seconds.  Press-Ups     Do these steps 5-10 times in a row:  1. Lie on your belly (face-down) on  the floor.  2. Place your hands near your head, about shoulder-width apart.  3. While you keep your back relaxed and keep your hips on the floor, slowly straighten your arms to raise the top half of your body and lift your shoulders. Do not use your back muscles. To make yourself more comfortable, you may change where you place your hands.  4. Stay in this position for 5 seconds.  5. Slowly return to lying flat on the floor.  Bridges     Do these steps 10 times in a row:  1. Lie on your back on a firm surface.  2. Bend your knees so they point up to the ceiling. Your feet should be flat on the floor.  3. Tighten your butt muscles and lift your butt off of the floor until your waist is almost as high as your knees. If you do not feel the muscles working in your butt and the back of your thighs, slide your feet 1-2 inches farther away from your butt.  4. Stay in this position for 3-5 seconds.  5. Slowly lower your butt to the floor, and let your butt muscles relax.  If this exercise is too easy, try doing it with your arms crossed over your chest.  Belly Crunches     Do these steps 5-10 times in a row:  1. Lie on your back on a firm bed or the floor with your legs stretched out.  2. Bend your knees so they point up to the ceiling. Your feet should be flat on the floor.  3. Cross your arms over your chest.  4. Tip your chin a little bit toward your chest but do not bend your neck.  5. Tighten your belly muscles and slowly raise your chest just enough to lift your shoulder blades a tiny bit off of the floor.  6. Slowly lower your chest and your head to the floor.  Back Lifts   Do these steps 5-10 times in a row:  1. Lie on your belly (face-down) with your arms at your sides, and rest your forehead on the floor.  2. Tighten the muscles in your legs and your butt.  3. Slowly lift your chest off of the floor while you keep your hips on the floor. Keep the back of your head in line with the curve in your back. Look at the  floor while you do this.  4. Stay in this position for 3-5 seconds.  5. Slowly lower your chest and your face to the floor.  Contact a doctor if:  · Your back pain gets a lot worse when you do an exercise.  · Your back pain does not lessen 2 hours after you exercise.  If you have any of these problems, stop doing the exercises. Do not do them again unless your doctor says it is okay.  Get help right away if:  · You have sudden, very bad back pain. If this happens, stop doing the exercises. Do not do them again unless your doctor says it is okay.  This information is not intended to replace advice given to you by your health care provider. Make sure you discuss any questions you have with your health care provider.  Document Released: 01/20/2012 Document Revised: 05/25/2017 Document Reviewed: 02/11/2016  Elsevier Interactive Patient Education © 2017 [employer]

## 2018-02-23 ENCOUNTER — EPISODE CHANGES (OUTPATIENT)
Dept: CASE MANAGEMENT | Facility: OTHER | Age: 78
End: 2018-02-23

## 2018-02-28 ENCOUNTER — OFFICE VISIT (OUTPATIENT)
Dept: FAMILY MEDICINE CLINIC | Facility: CLINIC | Age: 78
End: 2018-02-28

## 2018-02-28 VITALS
WEIGHT: 173 LBS | BODY MASS INDEX: 24.22 KG/M2 | SYSTOLIC BLOOD PRESSURE: 110 MMHG | HEIGHT: 71 IN | DIASTOLIC BLOOD PRESSURE: 58 MMHG | HEART RATE: 93 BPM | OXYGEN SATURATION: 93 % | TEMPERATURE: 97.7 F | RESPIRATION RATE: 15 BRPM

## 2018-02-28 DIAGNOSIS — J47.0 BRONCHIECTASIS WITH ACUTE LOWER RESPIRATORY INFECTION (HCC): ICD-10-CM

## 2018-02-28 DIAGNOSIS — J18.9 PNEUMONIA OF LEFT UPPER LOBE DUE TO INFECTIOUS ORGANISM: Primary | ICD-10-CM

## 2018-02-28 DIAGNOSIS — I50.32 CHRONIC DIASTOLIC CHF (CONGESTIVE HEART FAILURE) (HCC): ICD-10-CM

## 2018-02-28 PROBLEM — S72.001D CLOSED FRACTURE OF RIGHT HIP WITH ROUTINE HEALING: Status: ACTIVE | Noted: 2018-02-28

## 2018-02-28 PROBLEM — J44.1 COPD EXACERBATION: Status: RESOLVED | Noted: 2017-06-17 | Resolved: 2018-02-28

## 2018-02-28 PROBLEM — T17.500A MUCUS PLUGGING OF BRONCHI: Status: RESOLVED | Noted: 2018-01-16 | Resolved: 2018-02-28

## 2018-02-28 PROBLEM — J40 BRONCHITIS: Status: RESOLVED | Noted: 2017-06-01 | Resolved: 2018-02-28

## 2018-02-28 LAB
BACTERIA SPEC RESP CULT: ABNORMAL
ERYTHROCYTE [DISTWIDTH] IN BLOOD BY AUTOMATED COUNT: 13.5 % (ref 4.5–15)
FLUAV AG NPH QL: NEGATIVE
FLUBV AG NPH QL IA: NEGATIVE
GRAM STN SPEC: ABNORMAL
HCT VFR BLD AUTO: 33.8 % (ref 35–60)
HGB BLD-MCNC: 11.1 G/DL (ref 13.5–18)
LYMPHOCYTES # BLD AUTO: 1.8 10*3/MM3 (ref 1.2–3.4)
LYMPHOCYTES NFR BLD AUTO: 22.7 % (ref 21–51)
MCH RBC QN AUTO: 30.9 PG (ref 26.1–33.1)
MCHC RBC AUTO-ENTMCNC: 33 G/DL (ref 33–37)
MCV RBC AUTO: 93.6 FL (ref 80–99)
MONOCYTES # BLD AUTO: 0.8 10*3/MM3 (ref 0.1–0.6)
MONOCYTES NFR BLD AUTO: 10 % (ref 2–9)
NEUTROPHILS # BLD AUTO: 5.5 10*3/MM3 (ref 1.4–6.5)
NEUTROPHILS NFR BLD AUTO: 67.3 % (ref 42–75)
PLATELET # BLD AUTO: 286 10*3/MM3 (ref 150–450)
PMV BLD AUTO: 7.4 FL (ref 7.1–10.5)
RBC # BLD AUTO: 3.61 10*6/MM3 (ref 4–6)
WBC NRBC COR # BLD: 8.1 10*3/MM3 (ref 4.5–10)

## 2018-02-28 PROCEDURE — 71046 X-RAY EXAM CHEST 2 VIEWS: CPT | Performed by: INTERNAL MEDICINE

## 2018-02-28 PROCEDURE — 85025 COMPLETE CBC W/AUTO DIFF WBC: CPT | Performed by: INTERNAL MEDICINE

## 2018-02-28 PROCEDURE — 87804 INFLUENZA ASSAY W/OPTIC: CPT | Performed by: INTERNAL MEDICINE

## 2018-02-28 PROCEDURE — 99214 OFFICE O/P EST MOD 30 MIN: CPT | Performed by: INTERNAL MEDICINE

## 2018-02-28 PROCEDURE — 36415 COLL VENOUS BLD VENIPUNCTURE: CPT | Performed by: INTERNAL MEDICINE

## 2018-02-28 RX ORDER — DOXYCYCLINE HYCLATE 100 MG
100 TABLET ORAL 2 TIMES DAILY
Qty: 20 TABLET | Refills: 0 | Status: SHIPPED | OUTPATIENT
Start: 2018-02-28 | End: 2018-03-07 | Stop reason: SDUPTHER

## 2018-02-28 RX ORDER — BISACODYL 10 MG
10 SUPPOSITORY, RECTAL RECTAL DAILY
COMMUNITY
End: 2018-03-14

## 2018-02-28 RX ORDER — IPRATROPIUM BROMIDE AND ALBUTEROL SULFATE 2.5; .5 MG/3ML; MG/3ML
3 SOLUTION RESPIRATORY (INHALATION)
Status: DISCONTINUED | OUTPATIENT
Start: 2018-02-28 | End: 2018-04-13

## 2018-02-28 RX ORDER — FLUOXETINE HYDROCHLORIDE 60 MG/1
10 TABLET, FILM COATED ORAL; ORAL DAILY
COMMUNITY
End: 2018-06-15 | Stop reason: SDUPTHER

## 2018-02-28 NOTE — PROGRESS NOTES
"Subjective   Tony Leonard is a 77 y.o. male.     History of Present Illness             Your patient's medications have not been marked as reviewed    (If this is a post hospital visit, please add the SmartLink \".[email or web address]\" to your progress note)     Patient was seen as a hospital follow-up for pneumonia.  Patient was seen by visiting nurse yesterday and was told had a temperature 100.3 and a pulse ox of 88%.  Patient will not go to the hospital this time but was seen in our office today with a pulse ox of 93% and temperature was 98.  WBC was 8100 with 10% monocytes.  His influenza test was negative.  His chest x-ray was no change from one taken 6 months ago.  He was placed on doxycycline and asked to follow-up in one week.    Patient's wheezing is been relieved with his home nebulizers.  He was given a breathing treatment in the office and his symptoms were relief.    X-Ray  Interpretation report in house X-rays that I personally viewed    Relevant Clinical Issues/Diagnoses/Indications:  Pneumonia chest x-ray        Clinical Findings:  No acute findings but chronic scarring          Comparative Data:  No change from x-ray 6 months ago          Date of Previous X-ray:  Change on current X-ray    Much of this encounter note is an electronic transcription/translation of spoken language to printed text.  The electronic translation of spoken language may permit erroneous, or at times, nonsensical words or phrases to be inadvertently transcribed.  Although I have reviewed the note for such errors, some may still exist.    The following portions of the patient's history were reviewed and updated as appropriate: allergies, current medications, past family history, past medical history, past social history, past surgical history and problem list.    Review of Systems   Constitutional: Negative for fatigue and fever.   HENT: Positive for congestion. Negative for trouble swallowing.    Eyes: Negative for " discharge and visual disturbance.   Respiratory: Positive for shortness of breath and wheezing. Negative for choking.    Cardiovascular: Negative for chest pain and palpitations.   Gastrointestinal: Negative for abdominal pain and blood in stool.   Endocrine: Negative.    Genitourinary: Negative for genital sores and hematuria.   Musculoskeletal: Negative for gait problem and joint swelling.   Skin: Negative for color change, pallor, rash and wound.   Allergic/Immunologic: Positive for environmental allergies. Negative for immunocompromised state.   Neurological: Negative for facial asymmetry and speech difficulty.   Psychiatric/Behavioral: Negative for hallucinations and suicidal ideas.       Objective   Physical Exam   Constitutional: He is oriented to person, place, and time. He appears well-developed and well-nourished.   HENT:   Head: Normocephalic.   Eyes: Conjunctivae are normal. Pupils are equal, round, and reactive to light.   Neck: Normal range of motion. Neck supple.   Cardiovascular: Normal rate, regular rhythm and normal heart sounds.  Exam reveals no gallop and no friction rub.    No murmur heard.  Pulmonary/Chest: Effort normal. No respiratory distress. He has wheezes. He has no rales. He exhibits no tenderness.   Abdominal: Soft. Bowel sounds are normal.   Musculoskeletal: Normal range of motion.   Neurological: He is alert and oriented to person, place, and time.   Skin: Skin is warm and dry.   Psychiatric: He has a normal mood and affect. His behavior is normal. Judgment and thought content normal.   Nursing note and vitals reviewed.      Assessment/Plan   Problems Addressed this Visit        Cardiovascular and Mediastinum    Chronic diastolic CHF (congestive heart failure)    Relevant Medications    ipratropium-albuterol (DUO-NEB) nebulizer solution 3 mL (Start on 2/28/2018  8:30 PM)    Other Relevant Orders    CBC & Differential (Completed)    CBC Auto Differential (Completed)       Respiratory     Bronchiectasis with acute lower respiratory infection    Relevant Medications    ipratropium-albuterol (DUO-NEB) nebulizer solution 3 mL (Start on 2/28/2018  8:30 PM)    Other Relevant Orders    CBC & Differential (Completed)    Influenza Antigen, Rapid - Swab, Nasopharynx (Completed)    CBC Auto Differential (Completed)    Pneumonia of left upper lobe due to infectious organism - Primary    Relevant Medications    ipratropium-albuterol (DUO-NEB) nebulizer solution 3 mL (Start on 2/28/2018  8:30 PM)    Other Relevant Orders    XR Chest PA & Lateral (Completed)    CBC & Differential (Completed)    Influenza Antigen, Rapid - Swab, Nasopharynx (Completed)    CBC Auto Differential (Completed)

## 2018-03-01 ENCOUNTER — EPISODE CHANGES (OUTPATIENT)
Dept: CASE MANAGEMENT | Facility: OTHER | Age: 78
End: 2018-03-01

## 2018-03-07 ENCOUNTER — OFFICE VISIT (OUTPATIENT)
Dept: FAMILY MEDICINE CLINIC | Facility: CLINIC | Age: 78
End: 2018-03-07

## 2018-03-07 VITALS
WEIGHT: 169.4 LBS | HEIGHT: 71 IN | SYSTOLIC BLOOD PRESSURE: 112 MMHG | OXYGEN SATURATION: 90 % | HEART RATE: 98 BPM | TEMPERATURE: 97.9 F | BODY MASS INDEX: 23.72 KG/M2 | DIASTOLIC BLOOD PRESSURE: 62 MMHG

## 2018-03-07 DIAGNOSIS — J40 BRONCHITIS: Primary | ICD-10-CM

## 2018-03-07 DIAGNOSIS — E78.5 DYSLIPIDEMIA: ICD-10-CM

## 2018-03-07 DIAGNOSIS — R06.2 WHEEZING: ICD-10-CM

## 2018-03-07 DIAGNOSIS — E55.9 VITAMIN D DEFICIENCY: ICD-10-CM

## 2018-03-07 DIAGNOSIS — F90.0 ATTENTION DEFICIT HYPERACTIVITY DISORDER (ADHD), PREDOMINANTLY INATTENTIVE TYPE: ICD-10-CM

## 2018-03-07 LAB
25(OH)D3 SERPL-MCNC: 39.4 NG/ML (ref 30–100)
ALBUMIN SERPL-MCNC: 3.8 G/DL (ref 3.5–5.2)
ALBUMIN/GLOB SERPL: 1.1 G/DL
ALP SERPL-CCNC: 100 U/L (ref 39–117)
ALT SERPL W P-5'-P-CCNC: 10 U/L (ref 1–41)
ANION GAP SERPL CALCULATED.3IONS-SCNC: 11.7 MMOL/L
AST SERPL-CCNC: 16 U/L (ref 1–40)
BASOPHILS # BLD AUTO: 0.12 10*3/MM3 (ref 0–0.2)
BASOPHILS NFR BLD AUTO: 1.4 % (ref 0–1.5)
BILIRUB SERPL-MCNC: 0.3 MG/DL (ref 0.1–1.2)
BUN BLD-MCNC: 20 MG/DL (ref 8–23)
BUN/CREAT SERPL: 20 (ref 7–25)
CALCIUM SPEC-SCNC: 9.4 MG/DL (ref 8.6–10.5)
CHLORIDE SERPL-SCNC: 101 MMOL/L (ref 98–107)
CHOLEST SERPL-MCNC: 179 MG/DL (ref 0–200)
CO2 SERPL-SCNC: 28.3 MMOL/L (ref 22–29)
CREAT BLD-MCNC: 1 MG/DL (ref 0.76–1.27)
DEPRECATED RDW RBC AUTO: 48.5 FL (ref 37–54)
EOSINOPHIL # BLD AUTO: 1.67 10*3/MM3 (ref 0–0.7)
EOSINOPHIL NFR BLD AUTO: 19 % (ref 0.3–6.2)
ERYTHROCYTE [DISTWIDTH] IN BLOOD BY AUTOMATED COUNT: 13.7 % (ref 11.5–14.5)
GFR SERPL CREATININE-BSD FRML MDRD: 72 ML/MIN/1.73
GLOBULIN UR ELPH-MCNC: 3.4 GM/DL
GLUCOSE BLD-MCNC: 113 MG/DL (ref 65–99)
HCT VFR BLD AUTO: 38.7 % (ref 40.4–52.2)
HDLC SERPL-MCNC: 41 MG/DL (ref 40–60)
HGB BLD-MCNC: 12.2 G/DL (ref 13.7–17.6)
IMM GRANULOCYTES # BLD: 0 10*3/MM3 (ref 0–0.03)
IMM GRANULOCYTES NFR BLD: 0 % (ref 0–0.5)
LDLC SERPL CALC-MCNC: 119 MG/DL (ref 0–100)
LDLC/HDLC SERPL: 2.9 {RATIO}
LYMPHOCYTES # BLD AUTO: 2.38 10*3/MM3 (ref 0.9–4.8)
LYMPHOCYTES NFR BLD AUTO: 27.1 % (ref 19.6–45.3)
MCH RBC QN AUTO: 30.4 PG (ref 27–32.7)
MCHC RBC AUTO-ENTMCNC: 31.5 G/DL (ref 32.6–36.4)
MCV RBC AUTO: 96.5 FL (ref 79.8–96.2)
MONOCYTES # BLD AUTO: 1.2 10*3/MM3 (ref 0.2–1.2)
MONOCYTES NFR BLD AUTO: 13.7 % (ref 5–12)
NEUTROPHILS # BLD AUTO: 3.42 10*3/MM3 (ref 1.9–8.1)
NEUTROPHILS NFR BLD AUTO: 38.8 % (ref 42.7–76)
PLATELET # BLD AUTO: 352 10*3/MM3 (ref 140–500)
PMV BLD AUTO: 9.7 FL (ref 6–12)
POTASSIUM BLD-SCNC: 4.2 MMOL/L (ref 3.5–5.2)
PROT SERPL-MCNC: 7.2 G/DL (ref 6–8.5)
PSA SERPL-MCNC: 12.96 NG/ML (ref 0–4)
RBC # BLD AUTO: 4.01 10*6/MM3 (ref 4.6–6)
SODIUM BLD-SCNC: 141 MMOL/L (ref 136–145)
TRIGL SERPL-MCNC: 96 MG/DL (ref 0–150)
VLDLC SERPL-MCNC: 19.2 MG/DL (ref 5–40)
WBC NRBC COR # BLD: 8.79 10*3/MM3 (ref 4.5–10.7)

## 2018-03-07 PROCEDURE — 82306 VITAMIN D 25 HYDROXY: CPT | Performed by: INTERNAL MEDICINE

## 2018-03-07 PROCEDURE — 99214 OFFICE O/P EST MOD 30 MIN: CPT | Performed by: INTERNAL MEDICINE

## 2018-03-07 PROCEDURE — G0103 PSA SCREENING: HCPCS | Performed by: INTERNAL MEDICINE

## 2018-03-07 PROCEDURE — 71046 X-RAY EXAM CHEST 2 VIEWS: CPT | Performed by: INTERNAL MEDICINE

## 2018-03-07 PROCEDURE — 80053 COMPREHEN METABOLIC PANEL: CPT | Performed by: INTERNAL MEDICINE

## 2018-03-07 PROCEDURE — 85025 COMPLETE CBC W/AUTO DIFF WBC: CPT | Performed by: INTERNAL MEDICINE

## 2018-03-07 PROCEDURE — 36415 COLL VENOUS BLD VENIPUNCTURE: CPT | Performed by: INTERNAL MEDICINE

## 2018-03-07 PROCEDURE — 80061 LIPID PANEL: CPT | Performed by: INTERNAL MEDICINE

## 2018-03-07 RX ORDER — DOXYCYCLINE HYCLATE 100 MG
100 TABLET ORAL 2 TIMES DAILY
Qty: 28 TABLET | Refills: 0 | Status: SHIPPED | OUTPATIENT
Start: 2018-03-07 | End: 2018-03-08 | Stop reason: SINTOL

## 2018-03-07 NOTE — PROGRESS NOTES
Subjective   Tony Leonard is a 77 y.o. male.     History of Present Illness   Patient was seen for wheezing patient has a history of bronchiectasis with pneumonia and acute bronchitis.  Patient's wheezing is relieved with his bronchodilators.  He uses a maintenance drug and rescue inhaler.  His x-ray did not show any acute pneumonia and was placed on 2 weeks of doxycycline.  His lipid status been well-controlled with his diet and exercise.  His ADHD is controlled with his medication.    X-Ray  Interpretation report in house X-rays that I personally viewed    Relevant Clinical Issues/Diagnoses/Indications:  Wheezing chest x-ray        Clinical Findings:  No acute finding          Comparative Data:  No previous x-ray          Date of Previous X-ray:  Change on current X-ray      Much of this encounter note is an electronic transcription/translation of spoken language to printed text.  The electronic translation of spoken language may permit erroneous, or at times, nonsensical words or phrases to be inadvertently transcribed.  Although I have reviewed the note for such errors, some may still exist.  The following portions of the patient's history were reviewed and updated as appropriate: allergies, current medications, past family history, past medical history, past social history, past surgical history and problem list.    Review of Systems   Constitutional: Negative for fatigue and fever.   HENT: Positive for congestion. Negative for trouble swallowing.    Eyes: Negative for discharge and visual disturbance.   Respiratory: Positive for cough and wheezing. Negative for choking and shortness of breath.    Cardiovascular: Negative for chest pain and palpitations.   Gastrointestinal: Negative for abdominal pain and blood in stool.   Endocrine: Negative.    Genitourinary: Negative for genital sores and hematuria.   Musculoskeletal: Negative for gait problem and joint swelling.   Skin: Negative for color change,  pallor, rash and wound.   Allergic/Immunologic: Positive for environmental allergies. Negative for immunocompromised state.   Neurological: Negative for facial asymmetry and speech difficulty.   Psychiatric/Behavioral: Negative for hallucinations and suicidal ideas.       Objective   Physical Exam   Constitutional: He is oriented to person, place, and time. He appears well-developed and well-nourished.   HENT:   Head: Normocephalic.   Eyes: Conjunctivae are normal. Pupils are equal, round, and reactive to light.   Neck: Normal range of motion. Neck supple.   Cardiovascular: Normal rate, regular rhythm and normal heart sounds.    Pulmonary/Chest: Effort normal. No respiratory distress. He has wheezes. He has no rales. He exhibits no tenderness.   Abdominal: Soft. Bowel sounds are normal.   Musculoskeletal: Normal range of motion.   Neurological: He is alert and oriented to person, place, and time.   Skin: Skin is warm and dry.   Psychiatric: He has a normal mood and affect. His behavior is normal. Judgment and thought content normal.   Nursing note and vitals reviewed.      Assessment/Plan   Problems Addressed this Visit        Other    ADD (attention deficit disorder)    Dyslipidemia      Other Visit Diagnoses     Bronchitis    -  Primary    Wheezing        Relevant Orders    XR Chest PA & Lateral (Completed)    CBC & Differential (Completed)    Comprehensive Metabolic Panel (Completed)    Lipid Panel (Completed)    Vitamin D 25 Hydroxy (Completed)    PSA Screen (Completed)    CBC Auto Differential (Completed)    Vitamin D deficiency         Relevant Orders    Vitamin D 25 Hydroxy (Completed)

## 2018-03-08 ENCOUNTER — TELEPHONE (OUTPATIENT)
Dept: FAMILY MEDICINE CLINIC | Facility: CLINIC | Age: 78
End: 2018-03-08

## 2018-03-08 RX ORDER — AMOXICILLIN 875 MG/1
875 TABLET, COATED ORAL 2 TIMES DAILY
Qty: 20 TABLET | Refills: 0 | Status: SHIPPED | OUTPATIENT
Start: 2018-03-08 | End: 2018-04-13

## 2018-03-08 NOTE — TELEPHONE ENCOUNTER
PT WAS SEEN BY DR BOYD YESTERDAY, AND TODAY HIS HOME HEALTH NURSE IS NOTICING A RASH ALL OVER PT'S BACK.   SHE BELIEVES IT IS DUE TO THE RX DOXYCYCLINE 100 MG, WHICH SHE SAYS WAS ORDERED AGAIN YESTERDAY.  CHARISSE ([facility]) [phone]

## 2018-03-09 ENCOUNTER — TELEPHONE (OUTPATIENT)
Dept: FAMILY MEDICINE CLINIC | Facility: CLINIC | Age: 78
End: 2018-03-09

## 2018-03-09 RX ORDER — METHYLPHENIDATE HYDROCHLORIDE 10 MG/1
10 TABLET ORAL DAILY
Qty: 30 TABLET | Refills: 0 | Status: SHIPPED | OUTPATIENT
Start: 2018-03-09 | End: 2018-04-13 | Stop reason: SDUPTHER

## 2018-03-14 ENCOUNTER — OFFICE VISIT (OUTPATIENT)
Dept: ORTHOPEDIC SURGERY | Facility: CLINIC | Age: 78
End: 2018-03-14

## 2018-03-14 VITALS — TEMPERATURE: 98.6 F | WEIGHT: 167 LBS | BODY MASS INDEX: 23.38 KG/M2 | HEIGHT: 71 IN

## 2018-03-14 DIAGNOSIS — Z09 SURGERY FOLLOW-UP: Primary | ICD-10-CM

## 2018-03-14 PROCEDURE — 73560 X-RAY EXAM OF KNEE 1 OR 2: CPT | Performed by: ORTHOPAEDIC SURGERY

## 2018-03-14 PROCEDURE — 99213 OFFICE O/P EST LOW 20 MIN: CPT | Performed by: ORTHOPAEDIC SURGERY

## 2018-03-14 PROCEDURE — 73502 X-RAY EXAM HIP UNI 2-3 VIEWS: CPT | Performed by: ORTHOPAEDIC SURGERY

## 2018-03-14 NOTE — PROGRESS NOTES
Patient Name: Tony Leonard   YOB: 1940  Referring Primary Care Physician: Erich Aviles MD  BMI: Body mass index is 23.29 kg/m².    Chief Complaint:    Chief Complaint   Patient presents with   • Right Leg - Follow-up, Pain        Subjective:    HPI:   Tony Leonard is a pleasant 77 y.o. year old who presents today for evaluation of   Chief Complaint   Patient presents with   • Right Leg - Follow-up, Pain   about 2 months sp right IT hip fx with orif with a nail.  Having right hip pain with multiple strengthening exercises trying to get off walker rollator to cane..  Previous hx of right hip and lbp.  Achy. Can sleep .  Takes intermittent pain pills.  Reviewed chart.  Has PSA that is 12+!  Explained multiple times and said he will call dr Sosa office for urology.  Reviewed his records and letters    This problem is new to this examiner.     Medications:   Home Medications:  Current Outpatient Prescriptions on File Prior to Visit   Medication Sig   • amoxicillin (AMOXIL) 875 MG tablet Take 1 tablet by mouth 2 (Two) Times a Day.   • apixaban (ELIQUIS) 2.5 MG tablet tablet Take 1 tablet by mouth Every 12 (Twelve) Hours.   • cetirizine (zyrTEC) 10 MG tablet Take 10 mg by mouth Daily As Needed (itching).   • cholecalciferol (VITAMIN D3) 1000 units tablet Take 1,000 Units by mouth Daily.   • clotrimazole-betamethasone (LOTRISONE) 1-0.05 % cream    • Crisaborole (EUCRISA) 2 % ointment Apply 1 application topically 2 (Two) Times a Day As Needed (rash).   • FLUoxetine (PROzac) 60 MG tablet Take 10 mg by mouth Daily.   • furosemide (LASIX) 20 MG tablet Take 1 tablet by mouth Daily. Take with potassium   • HYDROcodone-acetaminophen (NORCO) 7.5-325 MG per tablet Take 1 tablet by mouth Every 4 (Four) Hours As Needed for Moderate Pain .   • ipratropium-albuterol (DUO-NEB) 0.5-2.5 mg/mL nebulizer Take 3 mL by nebulization 4 (Four) Times a Day.   • magnesium hydroxide (MILK OF MAGNESIA) 2400 MG/10ML  suspension suspension Take 10 mL by mouth Daily As Needed (constipation).   • methylphenidate (RITALIN) 10 MG tablet Take 1 tablet by mouth Daily. For ADD   • Multiple Vitamins-Minerals (MULTIVITAMIN ADULT PO) Take 1 tablet by mouth Daily.   • pantoprazole (PROTONIX) 40 MG EC tablet Take 1 tablet by mouth Daily.   • rOPINIRole (REQUIP) 0.5 MG tablet Take 0.5 mg by mouth Every Night. Take 0.5mg 1 hour prior to bedtime, may repeat if needed Max of 1mg total dose   • sodium chloride 7 % nebulizer solution nebulizer solution    • tamsulosin (FLOMAX) 0.4 MG capsule 24 hr capsule Take 2 capsules by mouth Daily.   • TiZANidine (ZANAFLEX) 2 MG capsule Take 1 capsule by mouth 3 (Three) Times a Day As Needed for Muscle Spasms.   • Umeclidinium-Vilanterol (ANORO ELLIPTA) 62.5-25 MCG/INH aerosol powder  Inhale 1 puff Daily.   • vitamin B-12 (CYANOCOBALAMIN) 2500 MCG sublingual tablet tablet Take 2,500 mcg by mouth Daily.   • [DISCONTINUED] acetaminophen (TYLENOL) 325 MG tablet Take 2 tablets by mouth Every 4 (Four) Hours As Needed for Mild Pain , Headache or Fever.   • [DISCONTINUED] bisacodyl (DULCOLAX) 10 MG suppository Insert 10 mg into the rectum Daily.   • [DISCONTINUED] sennosides-docusate sodium (SENOKOT-S) 8.6-50 MG tablet Take 2 tablets by mouth Every Night.     Current Facility-Administered Medications on File Prior to Visit   Medication   • ipratropium-albuterol (DUO-NEB) nebulizer solution 3 mL     Current Medications:  Scheduled Meds:  ipratropium-albuterol 3 mL Nebulization 4x Daily - RT     Continuous Infusions:   PRN Meds:.    I have reviewed the patient's medical history in detail and updated the computerized patient record.  Review and summarization of old records includes:    Past Medical History:   Diagnosis Date   • ADHD (attention deficit hyperactivity disorder)    • Bronchiectasis    • Colon polyp    • COPD (chronic obstructive pulmonary disease)    • Diverticulosis    • On home oxygen therapy    •  Pneumonia         Past Surgical History:   Procedure Laterality Date   • BRONCHOSCOPY N/A 4/30/2016    Procedure: BRONCHOSCOPY with BAL of right lower lobe and left  lower lobe.  ;  Surgeon: Nando Diaz MD;  Location: [facility] ENDOSCOPY;  Service:    • BRONCHOSCOPY N/A 4/28/2017    Procedure: BRONCHOSCOPY;  Surgeon: Katharina Salter MD;  Location: [facility] ENDOSCOPY;  Service:    • BRONCHOSCOPY Bilateral 6/29/2017    Procedure: BRONCHOSCOPY WITH WASHINGS;  Surgeon: Katharina Salter MD;  Location: [facility] ENDOSCOPY;  Service:    • BRONCHOSCOPY N/A 1/22/2018    Procedure: BRONCHOSCOPY AT BEDSIDE with BAL;  Surgeon: Katharina Salter MD;  Location: [facility] ENDOSCOPY;  Service:    • BRONCHOSCOPY N/A 1/30/2018    Procedure: BRONCHOSCOPY with BAL and washing;  Surgeon: Shreyas Wild MD;  Location: [facility] ENDOSCOPY;  Service:    • COLONOSCOPY  05/17/2013    eh, ih, tort, sig tics   • ENDOSCOPY  03/19/2015    z line irreg, hh   • HERNIA REPAIR     • HIP OPEN REDUCTION Right 1/17/2018    Procedure: HIP OPEN REDUCTION INTERNAL FIXATION WITH DYNAMIC HIP SCREW;  Surgeon: Les Black MD;  Location: [facility] OR;  Service:    • SPINE SURGERY     • TONSILLECTOMY          Social History     Occupational History   • Not on file.     Social History Main Topics   • Smoking status: Never Smoker   • Smokeless tobacco: Never Used   • Alcohol use Yes      Comment: red wine occassional   • Drug use: No   • Sexual activity: Defer    Social History     Social History Narrative   • No narrative on file        Family History   Problem Relation Age of Onset   • Thyroid disease Mother    • No Known Problems Father        ROS: 14 point review of systems was performed and all other systems were reviewed and are negative except for documented findings in HPI and today's encounter.     Allergies:   Allergies   Allergen Reactions   • Doxycycline Rash   • Latex Rash   • Sulfa Antibiotics Rash     Constitutional:  Denies fever, shaking or chills   Eyes:  " Denies change in visual acuity   HENT:  Denies nasal congestion or sore throat   Respiratory:  Denies cough or shortness of breath   Cardiovascular:  Denies chest pain or severe LE edema   GI:  Denies abdominal pain, nausea, vomiting, bloody stools or diarrhea   Musculoskeletal:  Numbness, tingling, pain, or loss of motor function only as noted above in history of present illness.  : Denies painful urination or hematuria  Integument:  Denies rash, lesion or ulceration   Neurologic:  Denies headache or focal weakness  Endocrine:  Denies lymphadenopathy  Psych:  Denies confusion or change in mental status   Hem:  Denies active bleeding    Subjective     Objective:    Physical Exam: 77 y.o. male  Wt Readings from Last 3 Encounters:   03/14/18 75.8 kg (167 lb)   03/07/18 76.8 kg (169 lb 6.4 oz)   02/28/18 78.5 kg (173 lb)     Ht Readings from Last 3 Encounters:   03/14/18 180.3 cm (71\")   03/07/18 180.3 cm (71\")   02/28/18 180.3 cm (70.98\")     Body mass index is 23.29 kg/m².  Facility age limit for growth percentiles is 20 years.  Vitals:    03/14/18 1244   Temp: 98.6 °F (37 °C)       Vital signs reviewed.   General Appearance:    Alert, cooperative, in no acute distress                  Eyes: conjunctiva clear  ENT: external ears and nose atraumatic  CV: no peripheral edema  Resp: normal respiratory effort  Skin: no rashes or wounds; normal turgor  Psych: mood and affect appropriate  Lymph: no nodes appreciated  Neuro: gross sensation intact  Vascular:  Palpable peripheral pulse in noted extremity  Musculoskeletal Extremities: HIP Exam: antalgic gait with assistive device right hip, pain with internal rotation, stiffness and trochanteric bursa tenderness 2+ pedal pulses and brisk capillary refill Pedal edema 1+    Radiology:   Imaging Results (last 72 hours)     Procedure Component Value Units Date/Time    XR Hip With or Without Pelvis 2 - 3 View Right [[id number]] Resulted:  03/14/18 1255     Updated:  03/14/18 " 1255    Impression:       Ordering physician's impression is located in the Encounter Note dated 03/14/18. X-ray performed in the DR room.      XR Knee 1 or 2 View Right [[id number]] Resulted:  03/14/18 1255     Updated:  03/14/18 1255    Impression:       Ordering physician's impression is located in the Encounter Note dated 03/14/18. X-ray performed in the DR room.          Imaging done today and discussed at length with the patient:    Indication: pain related symptoms,  Views: 2V AP&LAT right knee(s) and right hip(s)   Findings: advanced arthritis, healed fracrture  Comparison views: viewed last xray done in the office.       Assessment:     ICD-10-CM ICD-9-CM   1. Surgery follow-up-HIP OPEN REDUCTION INTERNAL FIXATION WITH DYNAMIC HIP SCREW - Right Z09 V67.00   hip pain and arthrtiis   increased PSA  Procedures       Plan: Biomechanics of pertinent body area discussed.  Risks, benefits, alternatives, comparisons, and complications of accepted medicines, injections, recommendations, surgical procedures, and therapies explained and education provided in laymen's terms. The patient was given the opportunity to ask questions and they were answerved to their satisfaction.   Natural history and expected course of this patient's diagnosis discussed along with evaluation of therapies. Questions answered.  Doesn't want ARCADIO.  Could send for injection in radiology if hip arthritis bothers- mult med problems.  Can do PT as long as it helps    3/14/2018

## 2018-03-22 ENCOUNTER — TELEPHONE (OUTPATIENT)
Dept: FAMILY MEDICINE CLINIC | Facility: CLINIC | Age: 78
End: 2018-03-22

## 2018-03-22 NOTE — TELEPHONE ENCOUNTER
PT MISSED APPOINTMENT YESTERDAY BECAUSE OF SNOW. I RESCHEDULED APPOINTMENT FOR 4/17/18 @ 4:30PM BUT HE WANTED TO LEAVE A MESSAGING ASKING IF HE COULD BE WORKED IN ON OR AFTER THE 28TH OF MARCH?

## 2018-03-23 ENCOUNTER — TELEPHONE (OUTPATIENT)
Dept: ORTHOPEDIC SURGERY | Facility: CLINIC | Age: 78
End: 2018-03-23

## 2018-03-23 DIAGNOSIS — Z87.81 STATUS POST-OPERATIVE REPAIR OF CLOSED HIP FRACTURE: Primary | ICD-10-CM

## 2018-03-23 DIAGNOSIS — Z98.890 STATUS POST-OPERATIVE REPAIR OF CLOSED HIP FRACTURE: Primary | ICD-10-CM

## 2018-03-23 NOTE — TELEPHONE ENCOUNTER
[facility]iv[facility]AD[facility] returned call. She could not discharge patient today because he had some oxygen issues. She says patient does need to transition to out-patient PT due to not being very compliant with protocol and thus still having pain. Need order for [facility].   ALSO, does [facility] want them to do a discharge done from the home or just via their office?

## 2018-03-26 NOTE — TELEPHONE ENCOUNTER
Spoke with the therapist.  She was unable to see the patient last week for his last visit due to him having a doctor's appt.  Have Okayed for her to see him one time this week for final visit and then transition him to OP PT.  Order placed for OP PT at [facility] per patient request. AMB

## 2018-03-30 ENCOUNTER — OFFICE VISIT (OUTPATIENT)
Dept: FAMILY MEDICINE CLINIC | Facility: CLINIC | Age: 78
End: 2018-03-30

## 2018-03-30 VITALS
HEIGHT: 71 IN | OXYGEN SATURATION: 94 % | BODY MASS INDEX: 23.52 KG/M2 | WEIGHT: 168 LBS | HEART RATE: 98 BPM | TEMPERATURE: 97.7 F

## 2018-03-30 DIAGNOSIS — E78.5 DYSLIPIDEMIA: ICD-10-CM

## 2018-03-30 DIAGNOSIS — R05.9 COUGH: ICD-10-CM

## 2018-03-30 DIAGNOSIS — R07.81 RIB PAIN: Primary | ICD-10-CM

## 2018-03-30 DIAGNOSIS — J47.0 BRONCHIECTASIS WITH ACUTE LOWER RESPIRATORY INFECTION (HCC): ICD-10-CM

## 2018-03-30 PROCEDURE — 71101 X-RAY EXAM UNILAT RIBS/CHEST: CPT | Performed by: INTERNAL MEDICINE

## 2018-03-30 PROCEDURE — 99214 OFFICE O/P EST MOD 30 MIN: CPT | Performed by: INTERNAL MEDICINE

## 2018-03-30 RX ORDER — PREDNISONE 10 MG/1
TABLET ORAL
COMMUNITY
Start: 2018-03-27 | End: 2018-04-13

## 2018-03-30 RX ORDER — CETIRIZINE HYDROCHLORIDE 10 MG/1
10 TABLET ORAL DAILY PRN
Qty: 30 TABLET | Refills: 3 | Status: SHIPPED | OUTPATIENT
Start: 2018-03-30 | End: 2018-07-17 | Stop reason: SDUPTHER

## 2018-03-30 RX ORDER — HYDROCODONE BITARTRATE AND ACETAMINOPHEN 7.5; 325 MG/1; MG/1
1 TABLET ORAL EVERY 4 HOURS PRN
Qty: 30 TABLET | Refills: 0 | Status: ON HOLD | OUTPATIENT
Start: 2018-03-30 | End: 2018-04-18

## 2018-03-30 RX ORDER — DOXYCYCLINE HYCLATE 100 MG/1
100 CAPSULE ORAL 2 TIMES DAILY
Status: ON HOLD | COMMUNITY
End: 2018-04-18

## 2018-03-30 RX ORDER — ALBUTEROL SULFATE 2.5 MG/3ML
SOLUTION RESPIRATORY (INHALATION)
COMMUNITY
Start: 2018-03-29 | End: 2018-07-17 | Stop reason: SDUPTHER

## 2018-03-30 NOTE — PROGRESS NOTES
Subjective   Tony Leonard is a 77 y.o. male.     History of Present Illness   Patient was seen for severe rib pain.  Patient has bronchiectasis and cough several irregular basis.  He started developing left lower rib pain approximately one week ago.  It was after a coughing fit.  It is severe pain below her cane and in the lower rib area and does not radiate.  He was relieved with 7. 5 mg of hydrocodone.  His rib x-ray indicated possible T10 fracture.  He will follow-up in 2 weeks.    X-Ray  Interpretation report in house X-rays that I personally viewed    Relevant Clinical Issues/Diagnoses/Indications:  Rib pain rib x-ray        Clinical Findings:  Acute fracture of T10 rib on the left          Comparative Data:  No previous x-ray          Date of Previous X-ray:  Change on current X-ray    Dictated utilizing Dragon dictation. If there are questions or for further clarification, please contact me.     The following portions of the patient's history were reviewed and updated as appropriate: allergies, current medications, past family history, past medical history, past social history, past surgical history and problem list.    Review of Systems   Constitutional: Negative for fatigue and fever.   HENT: Positive for congestion. Negative for trouble swallowing.    Eyes: Negative for discharge and visual disturbance.   Respiratory: Positive for cough. Negative for choking and shortness of breath.    Cardiovascular: Positive for chest pain. Negative for palpitations.   Gastrointestinal: Negative for abdominal pain and blood in stool.   Endocrine: Negative.    Genitourinary: Negative for genital sores and hematuria.   Musculoskeletal: Negative for gait problem and joint swelling.   Skin: Negative for color change, pallor, rash and wound.   Allergic/Immunologic: Positive for environmental allergies. Negative for immunocompromised state.   Neurological: Negative for facial asymmetry and speech difficulty.    Psychiatric/Behavioral: Negative for hallucinations and suicidal ideas.       Objective   Physical Exam   Constitutional: He is oriented to person, place, and time. He appears well-developed and well-nourished.   HENT:   Head: Normocephalic.   Eyes: Conjunctivae are normal. Pupils are equal, round, and reactive to light.   Neck: Normal range of motion. Neck supple.   Cardiovascular: Normal rate, regular rhythm and normal heart sounds.  Exam reveals no gallop and no friction rub.    No murmur heard.  Pulmonary/Chest: Effort normal and breath sounds normal. No respiratory distress. He has no wheezes. He has no rales. He exhibits tenderness.   Abdominal: Soft. Bowel sounds are normal.   Musculoskeletal: Normal range of motion.   Neurological: He is alert and oriented to person, place, and time.   Skin: Skin is warm and dry.   Psychiatric: He has a normal mood and affect. His behavior is normal. Judgment and thought content normal.   Nursing note and vitals reviewed.      Assessment/Plan   Problems Addressed this Visit        Respiratory    Bronchiectasis with acute lower respiratory infection    Relevant Medications    albuterol (PROVENTIL) (2.5 MG/3ML) 0.083% nebulizer solution    doxycycline (VIBRAMYCIN) 100 MG capsule    cetirizine (zyrTEC) 10 MG tablet    Other Relevant Orders    XR ribs left w pa chest (Completed)       Other    Dyslipidemia      Other Visit Diagnoses     Rib pain    -  Primary    Relevant Orders    XR ribs left w pa chest (Completed)    Cough        Relevant Orders    XR ribs left w pa chest (Completed)

## 2018-04-13 ENCOUNTER — OFFICE VISIT (OUTPATIENT)
Dept: FAMILY MEDICINE CLINIC | Facility: CLINIC | Age: 78
End: 2018-04-13

## 2018-04-13 VITALS
TEMPERATURE: 97.8 F | SYSTOLIC BLOOD PRESSURE: 100 MMHG | BODY MASS INDEX: 23.66 KG/M2 | HEART RATE: 80 BPM | HEIGHT: 71 IN | OXYGEN SATURATION: 94 % | WEIGHT: 169 LBS | DIASTOLIC BLOOD PRESSURE: 46 MMHG | RESPIRATION RATE: 14 BRPM

## 2018-04-13 DIAGNOSIS — I50.32 CHRONIC DIASTOLIC CHF (CONGESTIVE HEART FAILURE) (HCC): ICD-10-CM

## 2018-04-13 DIAGNOSIS — J42 CHRONIC BRONCHITIS, UNSPECIFIED CHRONIC BRONCHITIS TYPE (HCC): Primary | ICD-10-CM

## 2018-04-13 DIAGNOSIS — F90.0 ATTENTION DEFICIT HYPERACTIVITY DISORDER (ADHD), PREDOMINANTLY INATTENTIVE TYPE: ICD-10-CM

## 2018-04-13 PROCEDURE — 99214 OFFICE O/P EST MOD 30 MIN: CPT | Performed by: INTERNAL MEDICINE

## 2018-04-13 RX ORDER — METHYLPHENIDATE HYDROCHLORIDE 10 MG/1
10 TABLET ORAL DAILY
Qty: 30 TABLET | Refills: 0 | Status: SHIPPED | OUTPATIENT
Start: 2018-04-13 | End: 2018-05-15 | Stop reason: SDUPTHER

## 2018-04-13 RX ORDER — ROPINIROLE 0.5 MG/1
0.5 TABLET, FILM COATED ORAL NIGHTLY
Qty: 90 TABLET | Refills: 3 | Status: SHIPPED | OUTPATIENT
Start: 2018-04-13 | End: 2018-05-15 | Stop reason: SDUPTHER

## 2018-04-13 RX ORDER — PANTOPRAZOLE SODIUM 40 MG/1
40 TABLET, DELAYED RELEASE ORAL DAILY
Qty: 30 TABLET | Refills: 6 | Status: SHIPPED | OUTPATIENT
Start: 2018-04-13 | End: 2018-10-10 | Stop reason: SDUPTHER

## 2018-04-13 NOTE — PROGRESS NOTES
Subjective   Tony Leonard is a 77 y.o. male.     History of Present Illness   Patient was seen for a follow-up on his chronic bronchitis.  Patient has regular exacerbations of his bronchitis.  Patient is recently stopped antibiotics and is doing much better.  He is continue his bronchodilators but is not wheezing at this time.  His ADHD is well-controlled on Ritalin.  Patient's congestive heart failure is been stable over the last 6 months.  He is also following with a cardiologist who he sees a regular basis.    Dictated utilizing Dragon dictation. If there are questions or for further clarification, please contact me.   The following portions of the patient's history were reviewed and updated as appropriate: allergies, current medications, past family history, past medical history, past social history, past surgical history and problem list.    Review of Systems   Constitutional: Negative for fatigue and fever.   HENT: Positive for congestion. Negative for trouble swallowing.    Eyes: Negative for discharge and visual disturbance.   Respiratory: Negative for choking and shortness of breath.    Cardiovascular: Negative for chest pain and palpitations.   Gastrointestinal: Negative for abdominal pain and blood in stool.   Endocrine: Negative.    Genitourinary: Negative for genital sores and hematuria.   Musculoskeletal: Negative for gait problem and joint swelling.   Skin: Negative for color change, pallor, rash and wound.   Allergic/Immunologic: Positive for environmental allergies. Negative for immunocompromised state.   Neurological: Negative for facial asymmetry and speech difficulty.   Psychiatric/Behavioral: Negative for hallucinations and suicidal ideas.       Objective   Physical Exam   Constitutional: He is oriented to person, place, and time. He appears well-developed and well-nourished.   HENT:   Head: Normocephalic.   Eyes: Conjunctivae are normal. Pupils are equal, round, and reactive to light.    Neck: Normal range of motion. Neck supple.   Cardiovascular: Normal rate, regular rhythm and normal heart sounds.  Exam reveals no gallop and no friction rub.    No murmur heard.  Pulmonary/Chest: Effort normal and breath sounds normal. No respiratory distress. He has no wheezes. He has no rales. He exhibits no tenderness.   Abdominal: Soft. Bowel sounds are normal.   Musculoskeletal: Normal range of motion. He exhibits no edema.   Neurological: He is alert and oriented to person, place, and time.   Skin: Skin is warm and dry.   Psychiatric: He has a normal mood and affect. His behavior is normal. Judgment and thought content normal.   Nursing note and vitals reviewed.      Assessment/Plan   Problems Addressed this Visit        Cardiovascular and Mediastinum    Chronic diastolic CHF (congestive heart failure)       Respiratory    COPD (chronic obstructive pulmonary disease) - Primary    Relevant Medications    umeclidinium-vilanterol (ANORO ELLIPTA) 62.5-25 MCG/INH aerosol powder  inhaler       Other    ADD (attention deficit disorder)      Other Visit Diagnoses    None.

## 2018-04-16 ENCOUNTER — APPOINTMENT (OUTPATIENT)
Dept: GENERAL RADIOLOGY | Facility: HOSPITAL | Age: 78
End: 2018-04-16

## 2018-04-16 ENCOUNTER — APPOINTMENT (OUTPATIENT)
Dept: CT IMAGING | Facility: HOSPITAL | Age: 78
End: 2018-04-16

## 2018-04-16 ENCOUNTER — HOSPITAL ENCOUNTER (INPATIENT)
Facility: HOSPITAL | Age: 78
LOS: 9 days | Discharge: HOME HEALTH | End: 2018-04-25
Attending: EMERGENCY MEDICINE | Admitting: INTERNAL MEDICINE

## 2018-04-16 DIAGNOSIS — R04.2 HEMOPTYSIS: ICD-10-CM

## 2018-04-16 DIAGNOSIS — J44.1 COPD EXACERBATION (HCC): Primary | ICD-10-CM

## 2018-04-16 DIAGNOSIS — R53.1 GENERAL WEAKNESS: ICD-10-CM

## 2018-04-16 DIAGNOSIS — I77.82 ANCA-ASSOCIATED VASCULITIS (HCC): ICD-10-CM

## 2018-04-16 DIAGNOSIS — J47.0 BRONCHIECTASIS WITH ACUTE LOWER RESPIRATORY INFECTION (HCC): ICD-10-CM

## 2018-04-16 LAB
ALBUMIN SERPL-MCNC: 3.9 G/DL (ref 3.5–5.2)
ALBUMIN/GLOB SERPL: 1.4 G/DL
ALP SERPL-CCNC: 83 U/L (ref 39–117)
ALT SERPL W P-5'-P-CCNC: 13 U/L (ref 1–41)
ANION GAP SERPL CALCULATED.3IONS-SCNC: 14.3 MMOL/L
AST SERPL-CCNC: 17 U/L (ref 1–40)
BASOPHILS # BLD AUTO: 0.05 10*3/MM3 (ref 0–0.2)
BASOPHILS NFR BLD AUTO: 0.4 % (ref 0–1.5)
BILIRUB SERPL-MCNC: 0.8 MG/DL (ref 0.1–1.2)
BUN BLD-MCNC: 19 MG/DL (ref 8–23)
BUN/CREAT SERPL: 20.9 (ref 7–25)
CALCIUM SPEC-SCNC: 9.2 MG/DL (ref 8.6–10.5)
CHLORIDE SERPL-SCNC: 99 MMOL/L (ref 98–107)
CO2 SERPL-SCNC: 24.7 MMOL/L (ref 22–29)
CREAT BLD-MCNC: 0.91 MG/DL (ref 0.76–1.27)
D DIMER PPP FEU-MCNC: 4.17 MCGFEU/ML (ref 0–0.49)
D-LACTATE SERPL-SCNC: 1.5 MMOL/L (ref 0.5–2)
DEPRECATED RDW RBC AUTO: 48.9 FL (ref 37–54)
EOSINOPHIL # BLD AUTO: 0.73 10*3/MM3 (ref 0–0.7)
EOSINOPHIL NFR BLD AUTO: 5.5 % (ref 0.3–6.2)
ERYTHROCYTE [DISTWIDTH] IN BLOOD BY AUTOMATED COUNT: 14.2 % (ref 11.5–14.5)
FLUAV AG NPH QL: NEGATIVE
FLUBV AG NPH QL IA: NEGATIVE
GFR SERPL CREATININE-BSD FRML MDRD: 80 ML/MIN/1.73
GLOBULIN UR ELPH-MCNC: 2.8 GM/DL
GLUCOSE BLD-MCNC: 102 MG/DL (ref 65–99)
HCT VFR BLD AUTO: 43.7 % (ref 40.4–52.2)
HGB BLD-MCNC: 13.9 G/DL (ref 13.7–17.6)
IMM GRANULOCYTES # BLD: 0.03 10*3/MM3 (ref 0–0.03)
IMM GRANULOCYTES NFR BLD: 0.2 % (ref 0–0.5)
INR PPP: 0.97 (ref 0.9–1.1)
LYMPHOCYTES # BLD AUTO: 1.4 10*3/MM3 (ref 0.9–4.8)
LYMPHOCYTES NFR BLD AUTO: 10.6 % (ref 19.6–45.3)
MAGNESIUM SERPL-MCNC: 2.2 MG/DL (ref 1.6–2.4)
MCH RBC QN AUTO: 30.2 PG (ref 27–32.7)
MCHC RBC AUTO-ENTMCNC: 31.8 G/DL (ref 32.6–36.4)
MCV RBC AUTO: 95 FL (ref 79.8–96.2)
MONOCYTES # BLD AUTO: 1.44 10*3/MM3 (ref 0.2–1.2)
MONOCYTES NFR BLD AUTO: 10.9 % (ref 5–12)
NEUTROPHILS # BLD AUTO: 9.57 10*3/MM3 (ref 1.9–8.1)
NEUTROPHILS NFR BLD AUTO: 72.4 % (ref 42.7–76)
NT-PROBNP SERPL-MCNC: 187.3 PG/ML (ref 0–1800)
PLATELET # BLD AUTO: 282 10*3/MM3 (ref 140–500)
PMV BLD AUTO: 9.6 FL (ref 6–12)
POTASSIUM BLD-SCNC: 4.1 MMOL/L (ref 3.5–5.2)
PROCALCITONIN SERPL-MCNC: 0.06 NG/ML (ref 0.1–0.25)
PROT SERPL-MCNC: 6.7 G/DL (ref 6–8.5)
PROTHROMBIN TIME: 12.7 SECONDS (ref 11.7–14.2)
RBC # BLD AUTO: 4.6 10*6/MM3 (ref 4.6–6)
SODIUM BLD-SCNC: 138 MMOL/L (ref 136–145)
TROPONIN T SERPL-MCNC: <0.01 NG/ML (ref 0–0.03)
WBC NRBC COR # BLD: 13.22 10*3/MM3 (ref 4.5–10.7)

## 2018-04-16 PROCEDURE — 87040 BLOOD CULTURE FOR BACTERIA: CPT | Performed by: EMERGENCY MEDICINE

## 2018-04-16 PROCEDURE — 94640 AIRWAY INHALATION TREATMENT: CPT

## 2018-04-16 PROCEDURE — 83880 ASSAY OF NATRIURETIC PEPTIDE: CPT | Performed by: EMERGENCY MEDICINE

## 2018-04-16 PROCEDURE — 83605 ASSAY OF LACTIC ACID: CPT | Performed by: EMERGENCY MEDICINE

## 2018-04-16 PROCEDURE — 71275 CT ANGIOGRAPHY CHEST: CPT

## 2018-04-16 PROCEDURE — 84484 ASSAY OF TROPONIN QUANT: CPT | Performed by: EMERGENCY MEDICINE

## 2018-04-16 PROCEDURE — 87804 INFLUENZA ASSAY W/OPTIC: CPT | Performed by: EMERGENCY MEDICINE

## 2018-04-16 PROCEDURE — 0 IOPAMIDOL PER 1 ML: Performed by: EMERGENCY MEDICINE

## 2018-04-16 PROCEDURE — 93010 ELECTROCARDIOGRAM REPORT: CPT | Performed by: INTERNAL MEDICINE

## 2018-04-16 PROCEDURE — 99285 EMERGENCY DEPT VISIT HI MDM: CPT

## 2018-04-16 PROCEDURE — 85610 PROTHROMBIN TIME: CPT | Performed by: EMERGENCY MEDICINE

## 2018-04-16 PROCEDURE — 94799 UNLISTED PULMONARY SVC/PX: CPT

## 2018-04-16 PROCEDURE — 85379 FIBRIN DEGRADATION QUANT: CPT | Performed by: EMERGENCY MEDICINE

## 2018-04-16 PROCEDURE — 85025 COMPLETE CBC W/AUTO DIFF WBC: CPT | Performed by: EMERGENCY MEDICINE

## 2018-04-16 PROCEDURE — 80053 COMPREHEN METABOLIC PANEL: CPT | Performed by: EMERGENCY MEDICINE

## 2018-04-16 PROCEDURE — 25010000002 METHYLPREDNISOLONE PER 125 MG: Performed by: EMERGENCY MEDICINE

## 2018-04-16 PROCEDURE — 93005 ELECTROCARDIOGRAM TRACING: CPT | Performed by: EMERGENCY MEDICINE

## 2018-04-16 PROCEDURE — 83735 ASSAY OF MAGNESIUM: CPT | Performed by: EMERGENCY MEDICINE

## 2018-04-16 PROCEDURE — 84145 PROCALCITONIN (PCT): CPT | Performed by: EMERGENCY MEDICINE

## 2018-04-16 PROCEDURE — 71046 X-RAY EXAM CHEST 2 VIEWS: CPT

## 2018-04-16 RX ORDER — METHYLPREDNISOLONE SODIUM SUCCINATE 125 MG/2ML
125 INJECTION, POWDER, LYOPHILIZED, FOR SOLUTION INTRAMUSCULAR; INTRAVENOUS ONCE
Status: COMPLETED | OUTPATIENT
Start: 2018-04-16 | End: 2018-04-16

## 2018-04-16 RX ORDER — ACETAMINOPHEN 500 MG
500 TABLET ORAL EVERY 6 HOURS PRN
COMMUNITY
End: 2018-04-25 | Stop reason: HOSPADM

## 2018-04-16 RX ORDER — SODIUM CHLORIDE 0.9 % (FLUSH) 0.9 %
10 SYRINGE (ML) INJECTION AS NEEDED
Status: DISCONTINUED | OUTPATIENT
Start: 2018-04-16 | End: 2018-04-25 | Stop reason: HOSPADM

## 2018-04-16 RX ORDER — ASPIRIN 81 MG/1
81 TABLET ORAL DAILY
COMMUNITY
End: 2019-09-20

## 2018-04-16 RX ORDER — IPRATROPIUM BROMIDE AND ALBUTEROL SULFATE 2.5; .5 MG/3ML; MG/3ML
3 SOLUTION RESPIRATORY (INHALATION) ONCE
Status: COMPLETED | OUTPATIENT
Start: 2018-04-16 | End: 2018-04-16

## 2018-04-16 RX ADMIN — IPRATROPIUM BROMIDE AND ALBUTEROL SULFATE 3 ML: .5; 3 SOLUTION RESPIRATORY (INHALATION) at 21:10

## 2018-04-16 RX ADMIN — IOPAMIDOL 95 ML: 755 INJECTION, SOLUTION INTRAVENOUS at 21:46

## 2018-04-16 RX ADMIN — METHYLPREDNISOLONE SODIUM SUCCINATE 125 MG: 125 INJECTION, POWDER, FOR SOLUTION INTRAMUSCULAR; INTRAVENOUS at 20:31

## 2018-04-17 LAB
B PERT DNA SPEC QL NAA+PROBE: NOT DETECTED
C PNEUM DNA NPH QL NAA+NON-PROBE: NOT DETECTED
FLUAV H1 2009 PAND RNA NPH QL NAA+PROBE: NOT DETECTED
FLUAV H1 HA GENE NPH QL NAA+PROBE: NOT DETECTED
FLUAV H3 RNA NPH QL NAA+PROBE: NOT DETECTED
FLUAV SUBTYP SPEC NAA+PROBE: NOT DETECTED
FLUBV RNA ISLT QL NAA+PROBE: NOT DETECTED
HADV DNA SPEC NAA+PROBE: NOT DETECTED
HCOV 229E RNA SPEC QL NAA+PROBE: NOT DETECTED
HCOV HKU1 RNA SPEC QL NAA+PROBE: NOT DETECTED
HCOV NL63 RNA SPEC QL NAA+PROBE: NOT DETECTED
HCOV OC43 RNA SPEC QL NAA+PROBE: NOT DETECTED
HMPV RNA NPH QL NAA+NON-PROBE: NOT DETECTED
HPIV1 RNA SPEC QL NAA+PROBE: NOT DETECTED
HPIV2 RNA SPEC QL NAA+PROBE: NOT DETECTED
HPIV3 RNA NPH QL NAA+PROBE: NOT DETECTED
HPIV4 P GENE NPH QL NAA+PROBE: NOT DETECTED
M PNEUMO IGG SER IA-ACNC: NOT DETECTED
RHINOVIRUS RNA SPEC NAA+PROBE: DETECTED
RSV RNA NPH QL NAA+NON-PROBE: NOT DETECTED

## 2018-04-17 PROCEDURE — 87633 RESP VIRUS 12-25 TARGETS: CPT | Performed by: INTERNAL MEDICINE

## 2018-04-17 PROCEDURE — 87070 CULTURE OTHR SPECIMN AEROBIC: CPT | Performed by: INTERNAL MEDICINE

## 2018-04-17 PROCEDURE — 86331 IMMUNODIFFUSION OUCHTERLONY: CPT | Performed by: INTERNAL MEDICINE

## 2018-04-17 PROCEDURE — 94799 UNLISTED PULMONARY SVC/PX: CPT

## 2018-04-17 PROCEDURE — 25010000002 METHYLPREDNISOLONE PER 125 MG: Performed by: INTERNAL MEDICINE

## 2018-04-17 PROCEDURE — 87486 CHLMYD PNEUM DNA AMP PROBE: CPT | Performed by: INTERNAL MEDICINE

## 2018-04-17 PROCEDURE — 87581 M.PNEUMON DNA AMP PROBE: CPT | Performed by: INTERNAL MEDICINE

## 2018-04-17 PROCEDURE — 86602 ANTINOMYCES ANTIBODY: CPT | Performed by: INTERNAL MEDICINE

## 2018-04-17 PROCEDURE — 86256 FLUORESCENT ANTIBODY TITER: CPT | Performed by: INTERNAL MEDICINE

## 2018-04-17 PROCEDURE — 86606 ASPERGILLUS ANTIBODY: CPT | Performed by: INTERNAL MEDICINE

## 2018-04-17 PROCEDURE — 87205 SMEAR GRAM STAIN: CPT | Performed by: INTERNAL MEDICINE

## 2018-04-17 PROCEDURE — 87798 DETECT AGENT NOS DNA AMP: CPT | Performed by: INTERNAL MEDICINE

## 2018-04-17 PROCEDURE — 86671 FUNGUS NES ANTIBODY: CPT | Performed by: INTERNAL MEDICINE

## 2018-04-17 PROCEDURE — 86609 BACTERIUM ANTIBODY: CPT | Performed by: INTERNAL MEDICINE

## 2018-04-17 PROCEDURE — 83520 IMMUNOASSAY QUANT NOS NONAB: CPT | Performed by: INTERNAL MEDICINE

## 2018-04-17 RX ORDER — FUROSEMIDE 20 MG/1
20 TABLET ORAL DAILY
Status: DISCONTINUED | OUTPATIENT
Start: 2018-04-17 | End: 2018-04-25 | Stop reason: HOSPADM

## 2018-04-17 RX ORDER — TAMSULOSIN HYDROCHLORIDE 0.4 MG/1
0.8 CAPSULE ORAL DAILY
Status: DISCONTINUED | OUTPATIENT
Start: 2018-04-17 | End: 2018-04-25 | Stop reason: HOSPADM

## 2018-04-17 RX ORDER — METHYLPREDNISOLONE SODIUM SUCCINATE 125 MG/2ML
60 INJECTION, POWDER, LYOPHILIZED, FOR SOLUTION INTRAMUSCULAR; INTRAVENOUS EVERY 8 HOURS
Status: DISCONTINUED | OUTPATIENT
Start: 2018-04-17 | End: 2018-04-17

## 2018-04-17 RX ORDER — TIZANIDINE 4 MG/1
2 TABLET ORAL EVERY 12 HOURS PRN
Status: DISCONTINUED | OUTPATIENT
Start: 2018-04-17 | End: 2018-04-19

## 2018-04-17 RX ORDER — METHYLPREDNISOLONE SODIUM SUCCINATE 125 MG/2ML
80 INJECTION, POWDER, LYOPHILIZED, FOR SOLUTION INTRAMUSCULAR; INTRAVENOUS EVERY 12 HOURS
Status: DISCONTINUED | OUTPATIENT
Start: 2018-04-17 | End: 2018-04-19

## 2018-04-17 RX ORDER — ONDANSETRON 2 MG/ML
4 INJECTION INTRAMUSCULAR; INTRAVENOUS EVERY 6 HOURS PRN
Status: DISCONTINUED | OUTPATIENT
Start: 2018-04-17 | End: 2018-04-25 | Stop reason: HOSPADM

## 2018-04-17 RX ORDER — FLUOXETINE 10 MG/1
10 CAPSULE ORAL DAILY
Status: DISCONTINUED | OUTPATIENT
Start: 2018-04-17 | End: 2018-04-25 | Stop reason: HOSPADM

## 2018-04-17 RX ORDER — ASPIRIN 81 MG/1
81 TABLET ORAL DAILY
Status: DISCONTINUED | OUTPATIENT
Start: 2018-04-17 | End: 2018-04-25 | Stop reason: HOSPADM

## 2018-04-17 RX ORDER — ROPINIROLE 0.5 MG/1
0.5 TABLET, FILM COATED ORAL NIGHTLY
Status: DISCONTINUED | OUTPATIENT
Start: 2018-04-17 | End: 2018-04-25 | Stop reason: HOSPADM

## 2018-04-17 RX ORDER — PANTOPRAZOLE SODIUM 40 MG/1
40 TABLET, DELAYED RELEASE ORAL
Status: DISCONTINUED | OUTPATIENT
Start: 2018-04-17 | End: 2018-04-25 | Stop reason: HOSPADM

## 2018-04-17 RX ORDER — TIZANIDINE 4 MG/1
2 TABLET ORAL 3 TIMES DAILY PRN
Status: DISCONTINUED | OUTPATIENT
Start: 2018-04-17 | End: 2018-04-17

## 2018-04-17 RX ORDER — IPRATROPIUM BROMIDE AND ALBUTEROL SULFATE 2.5; .5 MG/3ML; MG/3ML
3 SOLUTION RESPIRATORY (INHALATION) EVERY 4 HOURS PRN
Status: DISCONTINUED | OUTPATIENT
Start: 2018-04-17 | End: 2018-04-25 | Stop reason: HOSPADM

## 2018-04-17 RX ORDER — BISACODYL 10 MG
10 SUPPOSITORY, RECTAL RECTAL DAILY PRN
Status: DISCONTINUED | OUTPATIENT
Start: 2018-04-17 | End: 2018-04-25 | Stop reason: HOSPADM

## 2018-04-17 RX ORDER — SODIUM CHLORIDE 0.9 % (FLUSH) 0.9 %
1-10 SYRINGE (ML) INJECTION AS NEEDED
Status: DISCONTINUED | OUTPATIENT
Start: 2018-04-17 | End: 2018-04-25 | Stop reason: HOSPADM

## 2018-04-17 RX ORDER — IPRATROPIUM BROMIDE AND ALBUTEROL SULFATE 2.5; .5 MG/3ML; MG/3ML
3 SOLUTION RESPIRATORY (INHALATION)
Status: DISCONTINUED | OUTPATIENT
Start: 2018-04-17 | End: 2018-04-17 | Stop reason: SDUPTHER

## 2018-04-17 RX ORDER — IPRATROPIUM BROMIDE AND ALBUTEROL SULFATE 2.5; .5 MG/3ML; MG/3ML
3 SOLUTION RESPIRATORY (INHALATION)
Status: DISCONTINUED | OUTPATIENT
Start: 2018-04-17 | End: 2018-04-18

## 2018-04-17 RX ORDER — ALUMINA, MAGNESIA, AND SIMETHICONE 2400; 2400; 240 MG/30ML; MG/30ML; MG/30ML
15 SUSPENSION ORAL EVERY 6 HOURS PRN
Status: DISCONTINUED | OUTPATIENT
Start: 2018-04-17 | End: 2018-04-25 | Stop reason: HOSPADM

## 2018-04-17 RX ADMIN — METHYLPREDNISOLONE SODIUM SUCCINATE 60 MG: 125 INJECTION, POWDER, FOR SOLUTION INTRAMUSCULAR; INTRAVENOUS at 03:43

## 2018-04-17 RX ADMIN — PANTOPRAZOLE SODIUM 40 MG: 40 TABLET, DELAYED RELEASE ORAL at 08:05

## 2018-04-17 RX ADMIN — METHYLPREDNISOLONE SODIUM SUCCINATE 60 MG: 125 INJECTION, POWDER, FOR SOLUTION INTRAMUSCULAR; INTRAVENOUS at 11:59

## 2018-04-17 RX ADMIN — TAMSULOSIN HYDROCHLORIDE 0.8 MG: 0.4 CAPSULE ORAL at 08:01

## 2018-04-17 RX ADMIN — IPRATROPIUM BROMIDE AND ALBUTEROL SULFATE 3 ML: .5; 3 SOLUTION RESPIRATORY (INHALATION) at 20:26

## 2018-04-17 RX ADMIN — ASPIRIN 81 MG: 81 TABLET ORAL at 08:01

## 2018-04-17 RX ADMIN — IPRATROPIUM BROMIDE AND ALBUTEROL SULFATE 3 ML: .5; 3 SOLUTION RESPIRATORY (INHALATION) at 03:25

## 2018-04-17 RX ADMIN — IPRATROPIUM BROMIDE AND ALBUTEROL SULFATE 3 ML: .5; 3 SOLUTION RESPIRATORY (INHALATION) at 16:49

## 2018-04-17 RX ADMIN — ROPINIROLE HYDROCHLORIDE 0.5 MG: 0.5 TABLET, FILM COATED ORAL at 20:53

## 2018-04-17 RX ADMIN — FLUOXETINE HYDROCHLORIDE 10 MG: 10 CAPSULE ORAL at 08:01

## 2018-04-17 RX ADMIN — FUROSEMIDE 20 MG: 20 TABLET ORAL at 08:01

## 2018-04-17 RX ADMIN — IPRATROPIUM BROMIDE AND ALBUTEROL SULFATE 3 ML: .5; 3 SOLUTION RESPIRATORY (INHALATION) at 07:24

## 2018-04-17 RX ADMIN — IPRATROPIUM BROMIDE AND ALBUTEROL SULFATE 3 ML: .5; 3 SOLUTION RESPIRATORY (INHALATION) at 12:23

## 2018-04-18 LAB
MAGNESIUM SERPL-MCNC: 2.2 MG/DL (ref 1.6–2.4)
POTASSIUM BLD-SCNC: 3.6 MMOL/L (ref 3.5–5.2)

## 2018-04-18 PROCEDURE — 94799 UNLISTED PULMONARY SVC/PX: CPT

## 2018-04-18 PROCEDURE — 84132 ASSAY OF SERUM POTASSIUM: CPT | Performed by: INTERNAL MEDICINE

## 2018-04-18 PROCEDURE — 83735 ASSAY OF MAGNESIUM: CPT | Performed by: INTERNAL MEDICINE

## 2018-04-18 PROCEDURE — 25010000002 METHYLPREDNISOLONE PER 125 MG: Performed by: INTERNAL MEDICINE

## 2018-04-18 RX ORDER — IPRATROPIUM BROMIDE AND ALBUTEROL SULFATE 2.5; .5 MG/3ML; MG/3ML
3 SOLUTION RESPIRATORY (INHALATION)
Status: DISCONTINUED | OUTPATIENT
Start: 2018-04-19 | End: 2018-04-25 | Stop reason: HOSPADM

## 2018-04-18 RX ORDER — BUDESONIDE 0.5 MG/2ML
1 INHALANT ORAL
Status: DISCONTINUED | OUTPATIENT
Start: 2018-04-18 | End: 2018-04-25 | Stop reason: HOSPADM

## 2018-04-18 RX ADMIN — IPRATROPIUM BROMIDE AND ALBUTEROL SULFATE 3 ML: .5; 3 SOLUTION RESPIRATORY (INHALATION) at 13:46

## 2018-04-18 RX ADMIN — METHYLPREDNISOLONE SODIUM SUCCINATE 80 MG: 125 INJECTION, POWDER, FOR SOLUTION INTRAMUSCULAR; INTRAVENOUS at 12:44

## 2018-04-18 RX ADMIN — PANTOPRAZOLE SODIUM 40 MG: 40 TABLET, DELAYED RELEASE ORAL at 06:16

## 2018-04-18 RX ADMIN — IPRATROPIUM BROMIDE AND ALBUTEROL SULFATE 3 ML: .5; 3 SOLUTION RESPIRATORY (INHALATION) at 09:08

## 2018-04-18 RX ADMIN — ROPINIROLE HYDROCHLORIDE 0.5 MG: 0.5 TABLET, FILM COATED ORAL at 20:34

## 2018-04-18 RX ADMIN — ASPIRIN 81 MG: 81 TABLET ORAL at 09:54

## 2018-04-18 RX ADMIN — METHYLPREDNISOLONE SODIUM SUCCINATE 80 MG: 125 INJECTION, POWDER, FOR SOLUTION INTRAMUSCULAR; INTRAVENOUS at 00:11

## 2018-04-18 RX ADMIN — FLUOXETINE HYDROCHLORIDE 10 MG: 10 CAPSULE ORAL at 09:54

## 2018-04-18 RX ADMIN — METHYLPREDNISOLONE SODIUM SUCCINATE 80 MG: 125 INJECTION, POWDER, FOR SOLUTION INTRAMUSCULAR; INTRAVENOUS at 23:36

## 2018-04-18 RX ADMIN — BUDESONIDE 1 MG: 0.5 INHALANT RESPIRATORY (INHALATION) at 13:45

## 2018-04-18 RX ADMIN — TAMSULOSIN HYDROCHLORIDE 0.8 MG: 0.4 CAPSULE ORAL at 09:55

## 2018-04-18 RX ADMIN — FUROSEMIDE 20 MG: 20 TABLET ORAL at 09:53

## 2018-04-18 RX ADMIN — IPRATROPIUM BROMIDE AND ALBUTEROL SULFATE 3 ML: .5; 3 SOLUTION RESPIRATORY (INHALATION) at 16:50

## 2018-04-19 LAB
BACTERIA SPEC RESP CULT: NORMAL
BACTERIA SPEC RESP CULT: NORMAL
GRAM STN SPEC: NORMAL

## 2018-04-19 PROCEDURE — 94799 UNLISTED PULMONARY SVC/PX: CPT

## 2018-04-19 PROCEDURE — 97161 PT EVAL LOW COMPLEX 20 MIN: CPT

## 2018-04-19 PROCEDURE — 25010000002 METHYLPREDNISOLONE PER 125 MG: Performed by: INTERNAL MEDICINE

## 2018-04-19 RX ORDER — METHYLPREDNISOLONE SODIUM SUCCINATE 40 MG/ML
20 INJECTION, POWDER, LYOPHILIZED, FOR SOLUTION INTRAMUSCULAR; INTRAVENOUS EVERY 12 HOURS
Status: DISCONTINUED | OUTPATIENT
Start: 2018-04-19 | End: 2018-04-21

## 2018-04-19 RX ORDER — TIZANIDINE 4 MG/1
1 TABLET ORAL EVERY 12 HOURS PRN
Status: DISCONTINUED | OUTPATIENT
Start: 2018-04-19 | End: 2018-04-19

## 2018-04-19 RX ORDER — METAXALONE 800 MG/1
800 TABLET ORAL DAILY PRN
Status: DISCONTINUED | OUTPATIENT
Start: 2018-04-19 | End: 2018-04-25 | Stop reason: HOSPADM

## 2018-04-19 RX ADMIN — METHYLPREDNISOLONE SODIUM SUCCINATE 20 MG: 125 INJECTION, POWDER, FOR SOLUTION INTRAMUSCULAR; INTRAVENOUS at 12:55

## 2018-04-19 RX ADMIN — BUDESONIDE 1 MG: 0.5 INHALANT RESPIRATORY (INHALATION) at 07:11

## 2018-04-19 RX ADMIN — BUDESONIDE 1 MG: 0.5 INHALANT RESPIRATORY (INHALATION) at 19:14

## 2018-04-19 RX ADMIN — PANTOPRAZOLE SODIUM 40 MG: 40 TABLET, DELAYED RELEASE ORAL at 06:23

## 2018-04-19 RX ADMIN — IPRATROPIUM BROMIDE AND ALBUTEROL SULFATE 3 ML: .5; 3 SOLUTION RESPIRATORY (INHALATION) at 00:04

## 2018-04-19 RX ADMIN — IPRATROPIUM BROMIDE AND ALBUTEROL SULFATE 3 ML: .5; 3 SOLUTION RESPIRATORY (INHALATION) at 07:11

## 2018-04-19 RX ADMIN — IPRATROPIUM BROMIDE AND ALBUTEROL SULFATE 3 ML: .5; 3 SOLUTION RESPIRATORY (INHALATION) at 14:11

## 2018-04-19 RX ADMIN — ASPIRIN 81 MG: 81 TABLET ORAL at 09:52

## 2018-04-19 RX ADMIN — FLUOXETINE HYDROCHLORIDE 10 MG: 10 CAPSULE ORAL at 09:52

## 2018-04-19 RX ADMIN — FUROSEMIDE 20 MG: 20 TABLET ORAL at 09:52

## 2018-04-19 RX ADMIN — ROPINIROLE HYDROCHLORIDE 0.5 MG: 0.5 TABLET, FILM COATED ORAL at 21:29

## 2018-04-19 RX ADMIN — TAMSULOSIN HYDROCHLORIDE 0.8 MG: 0.4 CAPSULE ORAL at 09:52

## 2018-04-19 RX ADMIN — BUDESONIDE 1 MG: 0.5 INHALANT RESPIRATORY (INHALATION) at 00:04

## 2018-04-19 RX ADMIN — IPRATROPIUM BROMIDE AND ALBUTEROL SULFATE 3 ML: .5; 3 SOLUTION RESPIRATORY (INHALATION) at 19:14

## 2018-04-19 RX ADMIN — IPRATROPIUM BROMIDE AND ALBUTEROL SULFATE 3 ML: .5; 3 SOLUTION RESPIRATORY (INHALATION) at 23:22

## 2018-04-20 ENCOUNTER — TELEPHONE (OUTPATIENT)
Dept: FAMILY MEDICINE CLINIC | Facility: CLINIC | Age: 78
End: 2018-04-20

## 2018-04-20 LAB
C-ANCA TITR SER IF: ABNORMAL TITER
MYELOPEROXIDASE AB SER-ACNC: <9 U/ML (ref 0–9)
P-ANCA ATYPICAL TITR SER IF: ABNORMAL TITER
P-ANCA TITR SER IF: ABNORMAL TITER
PROTEINASE3 AB SER IA-ACNC: <3.5 U/ML (ref 0–3.5)

## 2018-04-20 PROCEDURE — 94799 UNLISTED PULMONARY SVC/PX: CPT

## 2018-04-20 PROCEDURE — 97110 THERAPEUTIC EXERCISES: CPT

## 2018-04-20 PROCEDURE — 25010000002 METHYLPREDNISOLONE PER 125 MG: Performed by: INTERNAL MEDICINE

## 2018-04-20 PROCEDURE — 25010000002 METHYLPREDNISOLONE PER 40 MG: Performed by: INTERNAL MEDICINE

## 2018-04-20 RX ADMIN — PANTOPRAZOLE SODIUM 40 MG: 40 TABLET, DELAYED RELEASE ORAL at 06:25

## 2018-04-20 RX ADMIN — BUDESONIDE 1 MG: 0.5 INHALANT RESPIRATORY (INHALATION) at 06:42

## 2018-04-20 RX ADMIN — IPRATROPIUM BROMIDE AND ALBUTEROL SULFATE 3 ML: .5; 3 SOLUTION RESPIRATORY (INHALATION) at 19:34

## 2018-04-20 RX ADMIN — FLUOXETINE HYDROCHLORIDE 10 MG: 10 CAPSULE ORAL at 08:18

## 2018-04-20 RX ADMIN — ASPIRIN 81 MG: 81 TABLET ORAL at 08:18

## 2018-04-20 RX ADMIN — IPRATROPIUM BROMIDE AND ALBUTEROL SULFATE 3 ML: .5; 3 SOLUTION RESPIRATORY (INHALATION) at 14:35

## 2018-04-20 RX ADMIN — METHYLPREDNISOLONE SODIUM SUCCINATE 20 MG: 125 INJECTION, POWDER, FOR SOLUTION INTRAMUSCULAR; INTRAVENOUS at 00:04

## 2018-04-20 RX ADMIN — ROPINIROLE HYDROCHLORIDE 0.5 MG: 0.5 TABLET, FILM COATED ORAL at 20:43

## 2018-04-20 RX ADMIN — TAMSULOSIN HYDROCHLORIDE 0.8 MG: 0.4 CAPSULE ORAL at 08:18

## 2018-04-20 RX ADMIN — BUDESONIDE 1 MG: 0.5 INHALANT RESPIRATORY (INHALATION) at 19:34

## 2018-04-20 RX ADMIN — IPRATROPIUM BROMIDE AND ALBUTEROL SULFATE 3 ML: .5; 3 SOLUTION RESPIRATORY (INHALATION) at 06:42

## 2018-04-20 RX ADMIN — FUROSEMIDE 20 MG: 20 TABLET ORAL at 08:18

## 2018-04-20 RX ADMIN — METHYLPREDNISOLONE SODIUM SUCCINATE 20 MG: 125 INJECTION, POWDER, FOR SOLUTION INTRAMUSCULAR; INTRAVENOUS at 13:25

## 2018-04-21 LAB
BACTERIA SPEC AEROBE CULT: NORMAL
BACTERIA SPEC AEROBE CULT: NORMAL

## 2018-04-21 PROCEDURE — 94799 UNLISTED PULMONARY SVC/PX: CPT

## 2018-04-21 PROCEDURE — 25010000002 METHYLPREDNISOLONE PER 40 MG: Performed by: INTERNAL MEDICINE

## 2018-04-21 PROCEDURE — 97110 THERAPEUTIC EXERCISES: CPT

## 2018-04-21 PROCEDURE — 63710000001 PREDNISONE PER 1 MG: Performed by: INTERNAL MEDICINE

## 2018-04-21 RX ORDER — PREDNISONE 20 MG/1
20 TABLET ORAL 2 TIMES DAILY WITH MEALS
Status: DISCONTINUED | OUTPATIENT
Start: 2018-04-21 | End: 2018-04-25 | Stop reason: HOSPADM

## 2018-04-21 RX ADMIN — BUDESONIDE 1 MG: 0.5 INHALANT RESPIRATORY (INHALATION) at 07:17

## 2018-04-21 RX ADMIN — FLUOXETINE HYDROCHLORIDE 10 MG: 10 CAPSULE ORAL at 09:31

## 2018-04-21 RX ADMIN — PANTOPRAZOLE SODIUM 40 MG: 40 TABLET, DELAYED RELEASE ORAL at 06:47

## 2018-04-21 RX ADMIN — IPRATROPIUM BROMIDE AND ALBUTEROL SULFATE 3 ML: .5; 3 SOLUTION RESPIRATORY (INHALATION) at 00:51

## 2018-04-21 RX ADMIN — TAMSULOSIN HYDROCHLORIDE 0.8 MG: 0.4 CAPSULE ORAL at 09:31

## 2018-04-21 RX ADMIN — PREDNISONE 20 MG: 20 TABLET ORAL at 19:42

## 2018-04-21 RX ADMIN — IPRATROPIUM BROMIDE AND ALBUTEROL SULFATE 3 ML: .5; 3 SOLUTION RESPIRATORY (INHALATION) at 21:32

## 2018-04-21 RX ADMIN — IPRATROPIUM BROMIDE AND ALBUTEROL SULFATE 3 ML: .5; 3 SOLUTION RESPIRATORY (INHALATION) at 07:18

## 2018-04-21 RX ADMIN — ROPINIROLE HYDROCHLORIDE 0.5 MG: 0.5 TABLET, FILM COATED ORAL at 20:05

## 2018-04-21 RX ADMIN — FUROSEMIDE 20 MG: 20 TABLET ORAL at 09:31

## 2018-04-21 RX ADMIN — ASPIRIN 81 MG: 81 TABLET ORAL at 09:31

## 2018-04-21 RX ADMIN — IPRATROPIUM BROMIDE AND ALBUTEROL SULFATE 3 ML: .5; 3 SOLUTION RESPIRATORY (INHALATION) at 11:40

## 2018-04-21 RX ADMIN — METHYLPREDNISOLONE SODIUM SUCCINATE 20 MG: 125 INJECTION, POWDER, FOR SOLUTION INTRAMUSCULAR; INTRAVENOUS at 00:27

## 2018-04-21 RX ADMIN — BUDESONIDE 1 MG: 0.5 INHALANT RESPIRATORY (INHALATION) at 21:32

## 2018-04-22 PROCEDURE — 94799 UNLISTED PULMONARY SVC/PX: CPT

## 2018-04-22 PROCEDURE — 63710000001 PREDNISONE PER 1 MG: Performed by: INTERNAL MEDICINE

## 2018-04-22 RX ADMIN — FLUOXETINE HYDROCHLORIDE 10 MG: 10 CAPSULE ORAL at 09:36

## 2018-04-22 RX ADMIN — IPRATROPIUM BROMIDE AND ALBUTEROL SULFATE 3 ML: .5; 3 SOLUTION RESPIRATORY (INHALATION) at 19:12

## 2018-04-22 RX ADMIN — TAMSULOSIN HYDROCHLORIDE 0.8 MG: 0.4 CAPSULE ORAL at 09:36

## 2018-04-22 RX ADMIN — PREDNISONE 20 MG: 20 TABLET ORAL at 09:36

## 2018-04-22 RX ADMIN — ROPINIROLE HYDROCHLORIDE 0.5 MG: 0.5 TABLET, FILM COATED ORAL at 20:21

## 2018-04-22 RX ADMIN — IPRATROPIUM BROMIDE AND ALBUTEROL SULFATE 3 ML: .5; 3 SOLUTION RESPIRATORY (INHALATION) at 01:09

## 2018-04-22 RX ADMIN — PREDNISONE 20 MG: 20 TABLET ORAL at 18:51

## 2018-04-22 RX ADMIN — PANTOPRAZOLE SODIUM 40 MG: 40 TABLET, DELAYED RELEASE ORAL at 06:20

## 2018-04-22 RX ADMIN — BUDESONIDE 1 MG: 0.5 INHALANT RESPIRATORY (INHALATION) at 19:12

## 2018-04-22 RX ADMIN — IPRATROPIUM BROMIDE AND ALBUTEROL SULFATE 3 ML: .5; 3 SOLUTION RESPIRATORY (INHALATION) at 07:05

## 2018-04-22 RX ADMIN — BUDESONIDE 1 MG: 0.5 INHALANT RESPIRATORY (INHALATION) at 07:05

## 2018-04-22 RX ADMIN — ASPIRIN 81 MG: 81 TABLET ORAL at 09:36

## 2018-04-22 RX ADMIN — FUROSEMIDE 20 MG: 20 TABLET ORAL at 09:36

## 2018-04-23 LAB
A FUMIGATUS1 AB SER QL ID: NEGATIVE
A FUMIGATUS6 AB SER-ACNC: NEGATIVE
A PULLULANS AB SER QL: NEGATIVE
MICROPOLYSPORA FAENI: NEGATIVE
PIGEON SERUM AB QL ID: NEGATIVE
T VULGARIS AB SER QL ID: NEGATIVE

## 2018-04-23 PROCEDURE — 94799 UNLISTED PULMONARY SVC/PX: CPT

## 2018-04-23 PROCEDURE — 97110 THERAPEUTIC EXERCISES: CPT

## 2018-04-23 PROCEDURE — 63710000001 PREDNISONE PER 1 MG: Performed by: INTERNAL MEDICINE

## 2018-04-23 RX ADMIN — TAMSULOSIN HYDROCHLORIDE 0.8 MG: 0.4 CAPSULE ORAL at 08:56

## 2018-04-23 RX ADMIN — FUROSEMIDE 20 MG: 20 TABLET ORAL at 08:56

## 2018-04-23 RX ADMIN — BUDESONIDE 1 MG: 0.5 INHALANT RESPIRATORY (INHALATION) at 19:33

## 2018-04-23 RX ADMIN — IPRATROPIUM BROMIDE AND ALBUTEROL SULFATE 3 ML: .5; 3 SOLUTION RESPIRATORY (INHALATION) at 00:16

## 2018-04-23 RX ADMIN — IPRATROPIUM BROMIDE AND ALBUTEROL SULFATE 3 ML: .5; 3 SOLUTION RESPIRATORY (INHALATION) at 07:36

## 2018-04-23 RX ADMIN — IPRATROPIUM BROMIDE AND ALBUTEROL SULFATE 3 ML: .5; 3 SOLUTION RESPIRATORY (INHALATION) at 12:24

## 2018-04-23 RX ADMIN — PREDNISONE 20 MG: 20 TABLET ORAL at 08:56

## 2018-04-23 RX ADMIN — ASPIRIN 81 MG: 81 TABLET ORAL at 08:56

## 2018-04-23 RX ADMIN — BUDESONIDE 1 MG: 0.5 INHALANT RESPIRATORY (INHALATION) at 07:36

## 2018-04-23 RX ADMIN — ROPINIROLE HYDROCHLORIDE 0.5 MG: 0.5 TABLET, FILM COATED ORAL at 20:12

## 2018-04-23 RX ADMIN — PREDNISONE 20 MG: 20 TABLET ORAL at 17:36

## 2018-04-23 RX ADMIN — IPRATROPIUM BROMIDE AND ALBUTEROL SULFATE 3 ML: .5; 3 SOLUTION RESPIRATORY (INHALATION) at 19:33

## 2018-04-23 RX ADMIN — PANTOPRAZOLE SODIUM 40 MG: 40 TABLET, DELAYED RELEASE ORAL at 05:49

## 2018-04-23 RX ADMIN — IPRATROPIUM BROMIDE AND ALBUTEROL SULFATE 3 ML: .5; 3 SOLUTION RESPIRATORY (INHALATION) at 23:49

## 2018-04-23 RX ADMIN — FLUOXETINE HYDROCHLORIDE 10 MG: 10 CAPSULE ORAL at 08:56

## 2018-04-24 ENCOUNTER — ANESTHESIA (OUTPATIENT)
Dept: GASTROENTEROLOGY | Facility: HOSPITAL | Age: 78
End: 2018-04-24

## 2018-04-24 ENCOUNTER — ANESTHESIA EVENT (OUTPATIENT)
Dept: GASTROENTEROLOGY | Facility: HOSPITAL | Age: 78
End: 2018-04-24

## 2018-04-24 PROCEDURE — 94799 UNLISTED PULMONARY SVC/PX: CPT

## 2018-04-24 PROCEDURE — 0BJ08ZZ INSPECTION OF TRACHEOBRONCHIAL TREE, VIA NATURAL OR ARTIFICIAL OPENING ENDOSCOPIC: ICD-10-PCS | Performed by: INTERNAL MEDICINE

## 2018-04-24 PROCEDURE — 63710000001 PREDNISONE PER 1 MG: Performed by: INTERNAL MEDICINE

## 2018-04-24 PROCEDURE — 87070 CULTURE OTHR SPECIMN AEROBIC: CPT | Performed by: INTERNAL MEDICINE

## 2018-04-24 PROCEDURE — 25010000002 PROPOFOL 10 MG/ML EMULSION: Performed by: ANESTHESIOLOGY

## 2018-04-24 PROCEDURE — 87186 SC STD MICRODIL/AGAR DIL: CPT | Performed by: INTERNAL MEDICINE

## 2018-04-24 PROCEDURE — 87205 SMEAR GRAM STAIN: CPT | Performed by: INTERNAL MEDICINE

## 2018-04-24 RX ORDER — SODIUM CHLORIDE 0.9 % (FLUSH) 0.9 %
3 SYRINGE (ML) INJECTION AS NEEDED
Status: DISCONTINUED | OUTPATIENT
Start: 2018-04-24 | End: 2018-04-25 | Stop reason: HOSPADM

## 2018-04-24 RX ORDER — LIDOCAINE HYDROCHLORIDE 10 MG/ML
0.5 INJECTION, SOLUTION INFILTRATION; PERINEURAL ONCE AS NEEDED
Status: DISCONTINUED | OUTPATIENT
Start: 2018-04-24 | End: 2018-04-25 | Stop reason: HOSPADM

## 2018-04-24 RX ORDER — SODIUM CHLORIDE, SODIUM LACTATE, POTASSIUM CHLORIDE, CALCIUM CHLORIDE 600; 310; 30; 20 MG/100ML; MG/100ML; MG/100ML; MG/100ML
1000 INJECTION, SOLUTION INTRAVENOUS CONTINUOUS
Status: DISCONTINUED | OUTPATIENT
Start: 2018-04-24 | End: 2018-04-25 | Stop reason: HOSPADM

## 2018-04-24 RX ORDER — PROPOFOL 10 MG/ML
VIAL (ML) INTRAVENOUS CONTINUOUS PRN
Status: DISCONTINUED | OUTPATIENT
Start: 2018-04-24 | End: 2018-04-24 | Stop reason: SURG

## 2018-04-24 RX ORDER — LIDOCAINE HYDROCHLORIDE 20 MG/ML
INJECTION, SOLUTION INFILTRATION; PERINEURAL AS NEEDED
Status: DISCONTINUED | OUTPATIENT
Start: 2018-04-24 | End: 2018-04-24 | Stop reason: SURG

## 2018-04-24 RX ORDER — LIDOCAINE HYDROCHLORIDE 20 MG/ML
INJECTION, SOLUTION EPIDURAL; INFILTRATION; INTRACAUDAL; PERINEURAL AS NEEDED
Status: DISCONTINUED | OUTPATIENT
Start: 2018-04-24 | End: 2018-04-24 | Stop reason: HOSPADM

## 2018-04-24 RX ORDER — PROPOFOL 10 MG/ML
VIAL (ML) INTRAVENOUS AS NEEDED
Status: DISCONTINUED | OUTPATIENT
Start: 2018-04-24 | End: 2018-04-24 | Stop reason: SURG

## 2018-04-24 RX ORDER — LIDOCAINE HYDROCHLORIDE 10 MG/ML
INJECTION, SOLUTION EPIDURAL; INFILTRATION; INTRACAUDAL; PERINEURAL AS NEEDED
Status: DISCONTINUED | OUTPATIENT
Start: 2018-04-24 | End: 2018-04-24 | Stop reason: HOSPADM

## 2018-04-24 RX ADMIN — PROPOFOL 200 MG: 10 INJECTION, EMULSION INTRAVENOUS at 09:29

## 2018-04-24 RX ADMIN — FLUOXETINE HYDROCHLORIDE 10 MG: 10 CAPSULE ORAL at 11:06

## 2018-04-24 RX ADMIN — FUROSEMIDE 20 MG: 20 TABLET ORAL at 11:06

## 2018-04-24 RX ADMIN — PREDNISONE 20 MG: 20 TABLET ORAL at 11:06

## 2018-04-24 RX ADMIN — TAMSULOSIN HYDROCHLORIDE 0.8 MG: 0.4 CAPSULE ORAL at 11:06

## 2018-04-24 RX ADMIN — PREDNISONE 20 MG: 20 TABLET ORAL at 17:21

## 2018-04-24 RX ADMIN — IPRATROPIUM BROMIDE AND ALBUTEROL SULFATE 3 ML: .5; 3 SOLUTION RESPIRATORY (INHALATION) at 06:38

## 2018-04-24 RX ADMIN — BUDESONIDE 1 MG: 0.5 INHALANT RESPIRATORY (INHALATION) at 20:14

## 2018-04-24 RX ADMIN — ASPIRIN 81 MG: 81 TABLET ORAL at 11:06

## 2018-04-24 RX ADMIN — ROPINIROLE HYDROCHLORIDE 0.5 MG: 0.5 TABLET, FILM COATED ORAL at 20:09

## 2018-04-24 RX ADMIN — SODIUM CHLORIDE, POTASSIUM CHLORIDE, SODIUM LACTATE AND CALCIUM CHLORIDE: 600; 310; 30; 20 INJECTION, SOLUTION INTRAVENOUS at 09:21

## 2018-04-24 RX ADMIN — BUDESONIDE 1 MG: 0.5 INHALANT RESPIRATORY (INHALATION) at 06:38

## 2018-04-24 RX ADMIN — IPRATROPIUM BROMIDE AND ALBUTEROL SULFATE 3 ML: .5; 3 SOLUTION RESPIRATORY (INHALATION) at 20:14

## 2018-04-24 RX ADMIN — LIDOCAINE HYDROCHLORIDE 100 MG: 20 INJECTION, SOLUTION INFILTRATION; PERINEURAL at 09:29

## 2018-04-24 RX ADMIN — PROPOFOL 100 MCG/KG/MIN: 10 INJECTION, EMULSION INTRAVENOUS at 09:30

## 2018-04-24 RX ADMIN — IPRATROPIUM BROMIDE AND ALBUTEROL SULFATE 3 ML: .5; 3 SOLUTION RESPIRATORY (INHALATION) at 23:49

## 2018-04-24 RX ADMIN — IPRATROPIUM BROMIDE AND ALBUTEROL SULFATE 3 ML: .5; 3 SOLUTION RESPIRATORY (INHALATION) at 12:44

## 2018-04-24 RX ADMIN — SODIUM CHLORIDE, POTASSIUM CHLORIDE, SODIUM LACTATE AND CALCIUM CHLORIDE 1000 ML: 600; 310; 30; 20 INJECTION, SOLUTION INTRAVENOUS at 08:47

## 2018-04-24 NOTE — ANESTHESIA POSTPROCEDURE EVALUATION
"Patient: Tony Leonard    Procedure Summary     Date:  04/24/18 Room / Location:   [facility]    Anesthesia Start:  0921 Anesthesia Stop:  0951    Procedure:  BRONCHOSCOPY WITH WASHING (Bilateral Bronchus) Diagnosis:       COPD exacerbation      Bronchiectasis with acute lower respiratory infection      (COPD exacerbation [J44.1])      (Bronchiectasis with acute lower respiratory infection [J47.0])    Surgeon:  Katharina Salter MD Provider:  Juliano Lou MD    Anesthesia Type:  MAC ASA Status:  4          Anesthesia Type: MAC  Last vitals  BP   129/63 (04/24/18 0840)   Temp   36.8 °C (98.2 °F) (04/24/18 0840)   Pulse   77 (04/24/18 0840)   Resp   20 (04/24/18 0840)     SpO2   99 % (04/24/18 0840)     Post Anesthesia Care and Evaluation    Patient location during evaluation: bedside  Patient participation: complete - patient participated  Level of consciousness: sleepy but conscious  Pain score: 0  Pain management: adequate  Airway patency: patent  Anesthetic complications: No anesthetic complications    Cardiovascular status: acceptable  Respiratory status: acceptable  Hydration status: acceptable    Comments: /63 (BP Location: Left arm, Patient Position: Lying)   Pulse 77   Temp 36.8 °C (98.2 °F) (Oral)   Resp 20   Ht 180.3 cm (71\")   Wt 72.6 kg (160 lb)   SpO2 99%   BMI 22.32 kg/m²         "

## 2018-04-24 NOTE — ANESTHESIA PROCEDURE NOTES
Airway  Urgency: elective    Date/Time: 4/24/2018 9:29 AM  End Time:4/24/2018 9:29 AM    General Information and Staff    Patient location during procedure: OR  Anesthesiologist: IMELDA TRINIDAD    Indications and Patient Condition  Indications for airway management: airway protection    Preoxygenated: yes  Mask difficulty assessment: 1 - vent by mask    Final Airway Details  Final airway type: supraglottic airway      Successful airway: Supraglottic airway: unique jennifer.  Size 4    Number of attempts at approach: 1

## 2018-04-24 NOTE — ANESTHESIA PREPROCEDURE EVALUATION
Anesthesia Evaluation     Patient summary reviewed and Nursing notes reviewed   NPO Solid Status: > 8 hours  NPO Liquid Status: > 8 hours           Airway   Mallampati: II  Neck ROM: full  No difficulty expected  Dental    (+) poor dentition    Pulmonary     breath sounds clear to auscultation  (+) pneumonia , COPD,   Cardiovascular     Rhythm: regular    (+) CHF,       Neuro/Psych  (+) psychiatric history,     GI/Hepatic/Renal/Endo    (+)  GERD,      Musculoskeletal     Abdominal    Substance History      OB/GYN          Other   (+) arthritis                     Anesthesia Plan    ASA 4     MAC   (Very sleepy)  intravenous induction   Anesthetic plan and risks discussed with patient.

## 2018-04-25 VITALS
SYSTOLIC BLOOD PRESSURE: 115 MMHG | BODY MASS INDEX: 22.4 KG/M2 | HEIGHT: 71 IN | OXYGEN SATURATION: 100 % | WEIGHT: 160 LBS | RESPIRATION RATE: 20 BRPM | TEMPERATURE: 98.4 F | HEART RATE: 93 BPM | DIASTOLIC BLOOD PRESSURE: 65 MMHG

## 2018-04-25 PROCEDURE — 94799 UNLISTED PULMONARY SVC/PX: CPT

## 2018-04-25 PROCEDURE — 63710000001 PREDNISONE PER 1 MG: Performed by: INTERNAL MEDICINE

## 2018-04-25 RX ORDER — BUDESONIDE 0.5 MG/2ML
1 INHALANT ORAL
Qty: 60 EACH | Refills: 2 | Status: SHIPPED | OUTPATIENT
Start: 2018-04-25 | End: 2018-07-17 | Stop reason: SDUPTHER

## 2018-04-25 RX ORDER — CEFDINIR 300 MG/1
300 CAPSULE ORAL 2 TIMES DAILY
Qty: 16 CAPSULE | Refills: 0 | Status: SHIPPED | OUTPATIENT
Start: 2018-04-25 | End: 2018-05-02 | Stop reason: SDUPTHER

## 2018-04-25 RX ORDER — PREDNISONE 20 MG/1
TABLET ORAL
Qty: 10 TABLET | Refills: 0 | Status: SHIPPED | OUTPATIENT
Start: 2018-04-25 | End: 2019-02-07

## 2018-04-25 RX ADMIN — IPRATROPIUM BROMIDE AND ALBUTEROL SULFATE 3 ML: .5; 3 SOLUTION RESPIRATORY (INHALATION) at 12:40

## 2018-04-25 RX ADMIN — TAMSULOSIN HYDROCHLORIDE 0.8 MG: 0.4 CAPSULE ORAL at 08:57

## 2018-04-25 RX ADMIN — ASPIRIN 81 MG: 81 TABLET ORAL at 08:57

## 2018-04-25 RX ADMIN — FUROSEMIDE 20 MG: 20 TABLET ORAL at 08:57

## 2018-04-25 RX ADMIN — PREDNISONE 20 MG: 20 TABLET ORAL at 17:47

## 2018-04-25 RX ADMIN — PANTOPRAZOLE SODIUM 40 MG: 40 TABLET, DELAYED RELEASE ORAL at 06:08

## 2018-04-25 RX ADMIN — PREDNISONE 20 MG: 20 TABLET ORAL at 08:57

## 2018-04-25 RX ADMIN — IPRATROPIUM BROMIDE AND ALBUTEROL SULFATE 3 ML: .5; 3 SOLUTION RESPIRATORY (INHALATION) at 07:32

## 2018-04-25 RX ADMIN — FLUOXETINE HYDROCHLORIDE 10 MG: 10 CAPSULE ORAL at 08:57

## 2018-04-25 RX ADMIN — BUDESONIDE 1 MG: 0.5 INHALANT RESPIRATORY (INHALATION) at 07:32

## 2018-04-26 ENCOUNTER — EPISODE CHANGES (OUTPATIENT)
Dept: CASE MANAGEMENT | Facility: OTHER | Age: 78
End: 2018-04-26

## 2018-04-26 LAB
BACTERIA SPEC RESP CULT: ABNORMAL
GRAM STN SPEC: ABNORMAL
GRAM STN SPEC: ABNORMAL

## 2018-04-27 ENCOUNTER — OFFICE VISIT (OUTPATIENT)
Dept: FAMILY MEDICINE CLINIC | Facility: CLINIC | Age: 78
End: 2018-04-27

## 2018-04-27 VITALS
HEART RATE: 72 BPM | HEIGHT: 71 IN | DIASTOLIC BLOOD PRESSURE: 50 MMHG | TEMPERATURE: 97.5 F | BODY MASS INDEX: 23.8 KG/M2 | RESPIRATION RATE: 16 BRPM | OXYGEN SATURATION: 93 % | WEIGHT: 170 LBS | SYSTOLIC BLOOD PRESSURE: 80 MMHG

## 2018-04-27 DIAGNOSIS — J44.1 COPD EXACERBATION (HCC): ICD-10-CM

## 2018-04-27 DIAGNOSIS — I95.9 HYPOTENSION, UNSPECIFIED HYPOTENSION TYPE: ICD-10-CM

## 2018-04-27 DIAGNOSIS — J47.0 BRONCHIECTASIS WITH ACUTE LOWER RESPIRATORY INFECTION (HCC): ICD-10-CM

## 2018-04-27 DIAGNOSIS — Z53.29 LEFT AGAINST MEDICAL ADVICE: ICD-10-CM

## 2018-04-27 DIAGNOSIS — R40.0 SLEEPINESS: ICD-10-CM

## 2018-04-27 DIAGNOSIS — J42 CHRONIC BRONCHITIS, UNSPECIFIED CHRONIC BRONCHITIS TYPE (HCC): Primary | ICD-10-CM

## 2018-04-27 PROCEDURE — 99213 OFFICE O/P EST LOW 20 MIN: CPT | Performed by: NURSE PRACTITIONER

## 2018-04-27 RX ORDER — HYDROCODONE BITARTRATE AND ACETAMINOPHEN 5; 325 MG/1; MG/1
TABLET ORAL
COMMUNITY
Start: 2018-04-26 | End: 2018-05-08 | Stop reason: SDUPTHER

## 2018-04-27 NOTE — PROGRESS NOTES
Subjective   Tony Leonard is a 77 y.o. male.     History of Present Illness   Here today to f/u for hospital visit, he was admitted to [facility] on 4/16/18-4/25/18, for SOB, copd exacerbation, bronchitis, please refer to d/c olaf for details, mucous plug, had bronchoscopy with atypical ANCA, he does have apt with Caitlin Sanabria, jennifer 5/25/18, he states he took zanaflex thtis morning and feels tired, he had tooth extracted at his dentist yesterday, he wears 2.5L O2 today in room, he drove himself, he states he is tired d/t muscle relaxer, also took pain medication given to him by his dentist yesterday this morning, he states he took this for back pain, cramps, he lives alone, he does have family in town. 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 He is supposed to have home health but states his phone does not work right now so he cannot get a hold of him. He states cough and SOA has improved, he feels better today. Denies fever, he states he is eating and drinking. Denies dizziness or lightheadedness. Patient sleepy in exam room    The following portions of the patient's history were reviewed and updated as appropriate: allergies, current medications, past family history, past medical history, past social history, past surgical history and problem list.    Review of Systems   Constitutional: Negative for chills, diaphoresis and fever.   Respiratory: Negative for cough and shortness of breath.    Cardiovascular: Negative for chest pain.   Musculoskeletal: Negative for arthralgias and myalgias.   Skin: Negative for pallor.   Neurological: Negative for dizziness, syncope, weakness, light-headedness and headaches.   All other systems reviewed and are negative.      Objective   Physical Exam   Constitutional: He is oriented to person, place, and time. He appears well-developed and well-nourished. He appears lethargic. He is sleeping.   HENT:    Head: Normocephalic.   Eyes: Pupils are equal, round, and reactive to light.   Neck: Normal range of motion.   Cardiovascular: Normal rate, regular rhythm, normal heart sounds and intact distal pulses.    Pulmonary/Chest: Effort normal and breath sounds normal. No respiratory distress. He has no decreased breath sounds. He has no wheezes. He has no rhonchi. He has no rales.   Musculoskeletal: Normal range of motion.   Neurological: He is oriented to person, place, and time. He appears lethargic.   Skin: Skin is warm and dry.   Psychiatric: He has a normal mood and affect. His speech is normal and behavior is normal.   Nursing note and vitals reviewed.        Assessment/Plan   Tony was seen today for breathing problem.    Diagnoses and all orders for this visit:    Chronic bronchitis, unspecified chronic bronchitis type    COPD exacerbation    Bronchiectasis with acute lower respiratory infection    Sleepiness    Hypotension, unspecified hypotension type    Left against medical advice      Patient very sleepy in exam room, spilled water in room, falling asleep then waking up with inappropriate comments in room. Patient was advised d/t his low BP and lethargy it is not safe to drive himself home at this time, offered to call family member, but recommended he go to the ER via ambulance at this time d/t hypotension.  Patient walked out of room, advised the patient risk of driving while on sedatives, patient refused ambulance and ride home, walked out of office, patient left office AMA.

## 2018-04-27 NOTE — PATIENT INSTRUCTIONS
Patient very sleepy in exam room, spilled water in room, falling asleep then waking up with inappropriate comments in room. Patient was advised d/t his low BP and lethargy it is not safe to drive himself home at this time, offered to call family member, but recommended he go to the ER via ambulance at this time d/t hypotension.  Patient walked out of room, advised the patient risk of driving while on sedatives, patient refused ambulance and ride home, walked out of office, patient left office AMA.

## 2018-05-01 ENCOUNTER — TELEPHONE (OUTPATIENT)
Dept: FAMILY MEDICINE CLINIC | Facility: CLINIC | Age: 78
End: 2018-05-01

## 2018-05-01 NOTE — TELEPHONE ENCOUNTER
----- Message from Erich Aviles MD sent at 4/30/2018  7:15 PM EDT -----  Need to know if on antibiotics    Busy signal

## 2018-05-01 NOTE — TELEPHONE ENCOUNTER
----- Message from Erich Aviles MD sent at 4/30/2018  7:15 PM EDT -----  Need to know if on antibiotics    LMTRC

## 2018-05-02 ENCOUNTER — TELEPHONE (OUTPATIENT)
Dept: FAMILY MEDICINE CLINIC | Facility: CLINIC | Age: 78
End: 2018-05-02

## 2018-05-02 ENCOUNTER — APPOINTMENT (OUTPATIENT)
Dept: GENERAL RADIOLOGY | Facility: HOSPITAL | Age: 78
End: 2018-05-02

## 2018-05-02 ENCOUNTER — HOSPITAL ENCOUNTER (EMERGENCY)
Facility: HOSPITAL | Age: 78
Discharge: HOME | End: 2018-05-02
Attending: EMERGENCY MEDICINE | Admitting: EMERGENCY MEDICINE

## 2018-05-02 VITALS
RESPIRATION RATE: 18 BRPM | OXYGEN SATURATION: 97 % | SYSTOLIC BLOOD PRESSURE: 129 MMHG | DIASTOLIC BLOOD PRESSURE: 64 MMHG | WEIGHT: 165 LBS | TEMPERATURE: 99.3 F | HEART RATE: 75 BPM | HEIGHT: 71 IN | BODY MASS INDEX: 23.1 KG/M2

## 2018-05-02 DIAGNOSIS — A49.8 PSEUDOMONAS AERUGINOSA INFECTION: Primary | ICD-10-CM

## 2018-05-02 PROCEDURE — 99283 EMERGENCY DEPT VISIT LOW MDM: CPT

## 2018-05-02 PROCEDURE — 71046 X-RAY EXAM CHEST 2 VIEWS: CPT

## 2018-05-02 RX ORDER — CIPROFLOXACIN 250 MG/1
500 TABLET, FILM COATED ORAL EVERY 12 HOURS SCHEDULED
Status: COMPLETED | OUTPATIENT
Start: 2018-05-02 | End: 2018-05-02

## 2018-05-02 RX ORDER — CEFDINIR 300 MG/1
300 CAPSULE ORAL 2 TIMES DAILY
Qty: 16 CAPSULE | Refills: 0 | Status: SHIPPED | OUTPATIENT
Start: 2018-05-02 | End: 2018-05-02

## 2018-05-02 RX ORDER — CIPROFLOXACIN 500 MG/1
500 TABLET, FILM COATED ORAL EVERY 12 HOURS
Qty: 20 TABLET | Refills: 0 | Status: SHIPPED | OUTPATIENT
Start: 2018-05-02 | End: 2018-05-15

## 2018-05-02 RX ADMIN — CIPROFLOXACIN 500 MG: 250 TABLET, FILM COATED ORAL at 22:06

## 2018-05-02 NOTE — ED TRIAGE NOTES
Pt states he saw his DR sometime last month and had blood work drawn.  Pt states that someone at the DR's office called his children and told him to come in because of an infection somewhere.  Pt denies any complaints.

## 2018-05-02 NOTE — ED TRIAGE NOTES
"Pt called from home about abnormal lab work (blood drawn about 5 days ago) pt states \"they told me I have an infection in my blood and that I need to go to the ED to be evaluated\". Pt denies SOB, CP, fever. Pt normally on 2.5 L at home, a & o x 4.   "

## 2018-05-02 NOTE — TELEPHONE ENCOUNTER
Pt came in office today and said he had taken Cefdinir and Prednisone and was told he had an infection and needed another round of abx.

## 2018-05-03 NOTE — ED PROVIDER NOTES
"EMERGENCY DEPARTMENT ENCOUNTER    CHIEF COMPLAINT  Chief Complaint: Abnormal lab  History given by: pt  History limited by: N/A  Room Number: 05/05  PMD: Erich Aviles MD      HPI:  Pt is a 77 y.o. male who presents because PCP office told him to come to the ED for abnormal lab results showing \"an infection somewhere\"  Pt had recent admission for COPD and bronch with cultures then.  Patient says PCP office called him about abnormal results but he is not sure what.   On assessment, pt states he feels fatigued, but reports feeling improved from previous hospital admission. Pt denies cough, fever, or any other pertinent symptoms. Pt is currently taking cefdinir.     Duration: pt obtained results PTA  Onset: N/A  Timing: N/A  Location: N/A  Radiation: N/A  Quality: abnormal lab  Intensity/Severity: mild  Progression: N/A  Associated Symptoms: Fatigue   Aggravating Factors: None reported  Alleviating Factors: None reported   Previous Episodes: PT had lab work performed approximately 5 days ago.  Treatment before arrival: Pt is currently taking ceftin.    PAST MEDICAL HISTORY  Active Ambulatory Problems     Diagnosis Date Noted   • Thrush, oral 03/11/2016   • ADD (attention deficit disorder) 03/11/2016   • Hemorrhoids 03/11/2016   • Bronchiectasis with acute lower respiratory infection 03/11/2016   • Diverticul disease small and large intestine, no perforati or abscess 03/11/2016   • Benign non-nodular prostatic hyperplasia without lower urinary tract symptoms 03/11/2016   • Gastroesophageal reflux disease 03/11/2016   • Malaise and fatigue 03/11/2016   • Environmental allergies 04/25/2016   • Hemoptysis 04/27/2016   • Dyslipidemia 05/11/2016   • Primary osteoarthritis involving multiple joints 05/11/2016   • Pneumonia of left upper lobe due to infectious organism 10/03/2016   • Abnormal EKG 10/04/2016   • COPD (chronic obstructive pulmonary disease) 10/04/2016   • Mild malnutrition 03/28/2017   • Acute on chronic " respiratory failure with hypoxia 04/27/2017   • Fracture, intertrochanteric, right femur, closed, initial encounter 01/16/2018   • Chronic diastolic CHF (congestive heart failure) 01/16/2018   • Hematoma of frontal scalp 01/16/2018   • Postoperative anemia due to acute blood loss 01/19/2018   • Urinary retention 01/25/2018   • Hemorrhagic disorder due to circulating anticoagulants 01/29/2018   • History of fracture of right hip 02/22/2018   • Closed fracture of right hip with routine healing 02/28/2018   • COPD exacerbation 04/16/2018     Resolved Ambulatory Problems     Diagnosis Date Noted   • Pneumonia of both lower lobes due to infectious organism 03/11/2016   • Pneumonia of right upper lobe due to infectious organism 04/22/2016   • COPD exacerbation 04/25/2016   • GERD (gastroesophageal reflux disease) 04/25/2016   • Chronic respiratory failure with hypoxia 10/04/2016   • Bacterial lobar pneumonia 12/27/2016   • Pneumonia of left lower lobe due to infectious organism 03/26/2017   • Bronchitis 06/01/2017   • COPD exacerbation 06/17/2017   • Leukocytosis 01/16/2018   • Right upper lobe pneumonia 01/20/2018   • Mucus plugging of bronchi 01/16/2018     Past Medical History:   Diagnosis Date   • ADHD (attention deficit hyperactivity disorder)    • Bronchiectasis    • Colon polyp    • COPD (chronic obstructive pulmonary disease)    • Diverticulosis    • On home oxygen therapy    • Pneumonia        PAST SURGICAL HISTORY  Past Surgical History:   Procedure Laterality Date   • BRONCHOSCOPY N/A 4/30/2016    Procedure: BRONCHOSCOPY with BAL of right lower lobe and left  lower lobe.  ;  Surgeon: Nando Diaz MD;  Location: [facility] ENDOSCOPY;  Service:    • BRONCHOSCOPY N/A 4/28/2017    Procedure: BRONCHOSCOPY;  Surgeon: Katharina Salter MD;  Location: [facility] ENDOSCOPY;  Service:    • BRONCHOSCOPY Bilateral 6/29/2017    Procedure: BRONCHOSCOPY WITH WASHINGS;  Surgeon: Katharina Salter MD;  Location: [facility] ENDOSCOPY;   Service:    • BRONCHOSCOPY N/A 1/22/2018    Procedure: BRONCHOSCOPY AT BEDSIDE with BAL;  Surgeon: Katharina Salter MD;  Location: [facility] ENDOSCOPY;  Service:    • BRONCHOSCOPY N/A 1/30/2018    Procedure: BRONCHOSCOPY with BAL and washing;  Surgeon: Shreyas Wild MD;  Location: [facility] ENDOSCOPY;  Service:    • BRONCHOSCOPY Bilateral 4/24/2018    Procedure: BRONCHOSCOPY WITH WASHING;  Surgeon: Katharina Salter MD;  Location: [facility] ENDOSCOPY;  Service: Pulmonary   • COLONOSCOPY  05/17/2013    eh, ih, tort, sig tics   • ENDOSCOPY  03/19/2015    z line irreg, hh   • HERNIA REPAIR     • HIP OPEN REDUCTION Right 1/17/2018    Procedure: HIP OPEN REDUCTION INTERNAL FIXATION WITH DYNAMIC HIP SCREW;  Surgeon: Les Black MD;  Location: [facility] OR;  Service:    • SPINE SURGERY     • TONSILLECTOMY         FAMILY HISTORY  Family History   Problem Relation Age of Onset   • Thyroid disease Mother    • No Known Problems Father        SOCIAL HISTORY  Social History     Social History   • Marital status: Single     Spouse name: N/A   • Number of children: N/A   • Years of education: N/A     Occupational History   • Not on file.     Social History Main Topics   • Smoking status: Never Smoker   • Smokeless tobacco: Never Used   • Alcohol use No      Comment: red wine occassional   • Drug use: No   • Sexual activity: Defer     Other Topics Concern   • Not on file     Social History Narrative   • No narrative on file       ALLERGIES  Doxycycline; Latex; and Sulfa antibiotics    REVIEW OF SYSTEMS  Review of Systems   Constitutional: Positive for fatigue. Negative for chills and fever.        Reported abnormal lab    HENT: Negative.  Negative for sore throat.    Eyes: Negative.    Respiratory: Negative.  Negative for cough.    Cardiovascular: Negative.  Negative for chest pain.   Gastrointestinal: Negative.    Genitourinary: Negative.  Negative for dysuria.   Musculoskeletal: Negative.  Negative for back pain.   Skin: Negative.   Negative for rash.   Neurological: Negative.  Negative for headaches.   All other systems reviewed and are negative.      PHYSICAL EXAM  ED Triage Vitals   Temp Heart Rate Resp BP SpO2   05/02/18 1941 05/02/18 1941 05/02/18 1941 05/02/18 1954 05/02/18 1941   99.3 °F (37.4 °C) 108 20 125/62 95 %      Temp src Heart Rate Source Patient Position BP Location FiO2 (%)   05/02/18 1941 05/02/18 1941 05/02/18 1954 05/02/18 1954 --   Tympanic Monitor Sitting Left arm        Physical Exam   Constitutional: He is oriented to person, place, and time and well-developed, well-nourished, and in no distress.   Elderly  Pt is non-toxic appearing    HENT:   Head: Normocephalic and atraumatic.   Eyes: EOM are normal. Pupils are equal, round, and reactive to light.   Neck: Normal range of motion. Neck supple.   Cardiovascular: Normal rate, regular rhythm, normal heart sounds and intact distal pulses.    Pulmonary/Chest: Effort normal and breath sounds normal. No respiratory distress.   Diminished breath sounds bilaterally    Abdominal: Soft. There is no tenderness. There is no rebound and no guarding.   Musculoskeletal: Normal range of motion. He exhibits edema (chronic BLE edema ) and tenderness (back pain with movement (chronic) ).   Neurological: He is alert and oriented to person, place, and time. He has normal sensation and normal strength.   Skin: Skin is warm and dry.   Psychiatric: Mood and affect normal.   Nursing note and vitals reviewed.    RADIOLOGY  XR Chest 2 View   Final Result   CONCLUSION: Patchy lingular segment infiltrate/atelectasis.        I ordered the above noted radiological studies. Interpreted by radiologist. Reviewed by me in PACS.       PROCEDURES  Procedures      PROGRESS AND CONSULTS  ED Course     2029  Ordered Chest XR for further evaluation.     2130  Evaluated pt and discussed Respiratory culture shows pseudomonas and plan to place pt on a new antibiotic.     2133  Placed consult to  Pulmonology.    2135  Discussed pt's case with Dr. Grace [Pulmonology] who recommends pt be placed on BID Cipro [500 mg] for 10 days and be discharged.     2144  Rechecked pt who is resting comfortably and in no acute distress. Discussed consult with pulmonology and plan to place pt on a new antibiotic due to respiratory culture growing pseudomonas. Recommend pt stop taking Ceftin. Discussed plan to administer a dose of antibiotics in the ER and discharge pt. Pt understands and agrees to plan, all questions addressed at this time.    2149  Ordered Cipro.     MEDICAL DECISION MAKING  Results were reviewed/discussed with the patient and they were also made aware of online access. Pt also made aware that some labs, such as cultures, will not be resulted during ER visit and follow up with PMD is necessary.     MDM  Number of Diagnoses or Management Options     Amount and/or Complexity of Data Reviewed  Tests in the radiology section of CPT®: ordered and reviewed (Chest XR CONCLUSION: Patchy lingular segment infiltrate/atelectasis.)  Discussion of test results with the performing providers: yes (Dr. Grace (Pulmonology) )  Decide to obtain previous medical records or to obtain history from someone other than the patient: yes  Review and summarize past medical records: yes (Pt was admitted here from 4/14-4/25/18 for COPD exacerbation and recurring mucus plugging.   Respiratory culture performed on 4/24/18 that grew pseudomonas. Pt had blood cultures performed on 4/16/18 that were negative.)  Independent visualization of images, tracings, or specimens: yes           DIAGNOSIS  Final diagnoses:   Pseudomonas aeruginosa infection       DISPOSITION  DISCHARGE    Patient discharged in stable condition.    Reviewed implications of results, diagnosis, meds, responsibility to follow up, warning signs and symptoms of possible worsening, potential complications and reasons to return to ER.    Patient/Family voiced understanding of  above instructions.    Discussed plan for discharge, as there is no emergent indication for admission. Patient referred to primary care provider for BP management due to today's BP. Pt/family is agreeable and understands need for follow up and repeat testing.  Pt is aware that discharge does not mean that nothing is wrong but it indicates no emergency is present that requires admission and they must continue care with follow-up as given below or physician of their choice.     FOLLOW-UP  Katharina Salter MD  [address] [address]  [phone]               Medication List      New Prescriptions    ciprofloxacin 500 MG tablet  Commonly known as:  CIPRO  Take 1 tablet by mouth Every 12 (Twelve) Hours.        Stop    cefdinir 300 MG capsule  Commonly known as:  OMNICEF              Latest Documented Vital Signs:  As of 1:59 AM  BP- 129/64 HR- 75 Temp- 99.3 °F (37.4 °C) (Tympanic) O2 sat- 97%    --  Documentation assistance provided by shan Lobo for Dr. Castro.  Information recorded by the scribe was done at my direction and has been verified and validated by me.            Aj Lobo  05/02/18 [id number]       Temo Castro MD  05/03/18 [id number]

## 2018-05-07 ENCOUNTER — TELEPHONE (OUTPATIENT)
Dept: SOCIAL WORK | Facility: HOSPITAL | Age: 78
End: 2018-05-07

## 2018-05-08 ENCOUNTER — TELEPHONE (OUTPATIENT)
Dept: FAMILY MEDICINE CLINIC | Facility: CLINIC | Age: 78
End: 2018-05-08

## 2018-05-08 RX ORDER — HYDROCODONE BITARTRATE AND ACETAMINOPHEN 5; 325 MG/1; MG/1
TABLET ORAL
Qty: 60 TABLET | Refills: 0 | Status: SHIPPED | OUTPATIENT
Start: 2018-05-08 | End: 2018-05-15 | Stop reason: SDUPTHER

## 2018-05-08 NOTE — TELEPHONE ENCOUNTER
PT STATES HE IS HAVING LOWER LUMBAR PAIN, WHICH IS VERY PAINFUL AND RX FOR PAIN DOESN'T SEEM TO TOUCH IT    PT ASKED IF DR BOYD COULD SEND IN ANOTHER RX THAT MAY HELP BETTER?

## 2018-05-09 ENCOUNTER — TELEPHONE (OUTPATIENT)
Dept: FAMILY MEDICINE CLINIC | Facility: CLINIC | Age: 78
End: 2018-05-09

## 2018-05-10 ENCOUNTER — EPISODE CHANGES (OUTPATIENT)
Dept: CASE MANAGEMENT | Facility: OTHER | Age: 78
End: 2018-05-10

## 2018-05-15 ENCOUNTER — TELEPHONE (OUTPATIENT)
Dept: FAMILY MEDICINE CLINIC | Facility: CLINIC | Age: 78
End: 2018-05-15

## 2018-05-15 RX ORDER — HYDROCODONE BITARTRATE AND ACETAMINOPHEN 5; 325 MG/1; MG/1
TABLET ORAL
Qty: 60 TABLET | Refills: 0 | Status: SHIPPED | OUTPATIENT
Start: 2018-05-15 | End: 2018-06-15 | Stop reason: DRUGHIGH

## 2018-05-15 RX ORDER — ROPINIROLE 0.5 MG/1
0.5 TABLET, FILM COATED ORAL NIGHTLY
Qty: 90 TABLET | Refills: 3 | Status: SHIPPED | OUTPATIENT
Start: 2018-05-15 | End: 2018-09-17 | Stop reason: SDUPTHER

## 2018-05-15 RX ORDER — METHYLPHENIDATE HYDROCHLORIDE 10 MG/1
10 TABLET ORAL DAILY
Qty: 30 TABLET | Refills: 0 | Status: SHIPPED | OUTPATIENT
Start: 2018-05-15 | End: 2018-06-15 | Stop reason: SDUPTHER

## 2018-05-15 NOTE — TELEPHONE ENCOUNTER
Refill hrydrocodone 5-325mg            Ritalin 10mg            requip 0.5 mg       krogers on file

## 2018-06-13 ENCOUNTER — TELEPHONE (OUTPATIENT)
Dept: FAMILY MEDICINE CLINIC | Facility: CLINIC | Age: 78
End: 2018-06-13

## 2018-06-14 ENCOUNTER — TELEPHONE (OUTPATIENT)
Dept: FAMILY MEDICINE CLINIC | Facility: CLINIC | Age: 78
End: 2018-06-14

## 2018-06-14 DIAGNOSIS — G89.29 CHRONIC LOW BACK PAIN WITH SCIATICA, SCIATICA LATERALITY UNSPECIFIED, UNSPECIFIED BACK PAIN LATERALITY: Primary | ICD-10-CM

## 2018-06-14 DIAGNOSIS — M54.40 CHRONIC LOW BACK PAIN WITH SCIATICA, SCIATICA LATERALITY UNSPECIFIED, UNSPECIFIED BACK PAIN LATERALITY: Primary | ICD-10-CM

## 2018-06-15 ENCOUNTER — TELEPHONE (OUTPATIENT)
Dept: FAMILY MEDICINE CLINIC | Facility: CLINIC | Age: 78
End: 2018-06-15

## 2018-06-15 RX ORDER — HYDROCODONE BITARTRATE AND ACETAMINOPHEN 10; 325 MG/1; MG/1
TABLET ORAL
Qty: 90 TABLET | Refills: 0 | Status: SHIPPED | OUTPATIENT
Start: 2018-06-15 | End: 2018-06-15 | Stop reason: SDUPTHER

## 2018-06-15 RX ORDER — CYCLOBENZAPRINE HCL 10 MG
10 TABLET ORAL 3 TIMES DAILY PRN
Qty: 90 TABLET | Refills: 3 | Status: SHIPPED | OUTPATIENT
Start: 2018-06-15 | End: 2018-06-15 | Stop reason: SDUPTHER

## 2018-06-15 RX ORDER — HYDROCODONE BITARTRATE AND ACETAMINOPHEN 10; 325 MG/1; MG/1
TABLET ORAL
Qty: 90 TABLET | Refills: 0 | Status: SHIPPED | OUTPATIENT
Start: 2018-06-15 | End: 2018-06-21 | Stop reason: SDUPTHER

## 2018-06-15 RX ORDER — FLUOXETINE HYDROCHLORIDE 20 MG/1
CAPSULE ORAL
Qty: 90 CAPSULE | Refills: 4 | Status: SHIPPED | OUTPATIENT
Start: 2018-06-15 | End: 2018-06-15 | Stop reason: SDUPTHER

## 2018-06-15 RX ORDER — FLUOXETINE HYDROCHLORIDE 20 MG/1
CAPSULE ORAL
Qty: 90 CAPSULE | Refills: 4 | Status: SHIPPED | OUTPATIENT
Start: 2018-06-15 | End: 2018-06-21 | Stop reason: SDUPTHER

## 2018-06-15 RX ORDER — METHYLPHENIDATE HYDROCHLORIDE 10 MG/1
10 TABLET ORAL DAILY
Qty: 30 TABLET | Refills: 0 | Status: SHIPPED | OUTPATIENT
Start: 2018-06-15 | End: 2018-06-15 | Stop reason: SDUPTHER

## 2018-06-15 RX ORDER — METHYLPHENIDATE HYDROCHLORIDE 10 MG/1
10 TABLET ORAL DAILY
Qty: 90 TABLET | Refills: 0 | Status: SHIPPED | OUTPATIENT
Start: 2018-06-15 | End: 2018-06-21 | Stop reason: SDUPTHER

## 2018-06-15 RX ORDER — METHYLPHENIDATE HYDROCHLORIDE 10 MG/1
10 TABLET ORAL DAILY
Qty: 90 TABLET | Refills: 0 | Status: SHIPPED | OUTPATIENT
Start: 2018-06-15 | End: 2018-06-15 | Stop reason: SDUPTHER

## 2018-06-15 RX ORDER — CYCLOBENZAPRINE HCL 10 MG
10 TABLET ORAL 3 TIMES DAILY PRN
Qty: 270 TABLET | Refills: 0 | Status: SHIPPED | OUTPATIENT
Start: 2018-06-15 | End: 2018-06-21 | Stop reason: ALTCHOICE

## 2018-06-15 RX ORDER — FLUOXETINE 10 MG/1
10 TABLET, FILM COATED ORAL DAILY
Qty: 90 TABLET | Refills: 3 | Status: SHIPPED | OUTPATIENT
Start: 2018-06-15 | End: 2018-06-15 | Stop reason: DRUGHIGH

## 2018-06-15 NOTE — TELEPHONE ENCOUNTER
[facility] so I called rick and cancelled and [facility]

## 2018-06-15 NOTE — TELEPHONE ENCOUNTER
Pt states is  but it is serviced [facility]  [phone]  F [phone]    [facility] to [facility]

## 2018-06-18 ENCOUNTER — TELEPHONE (OUTPATIENT)
Dept: FAMILY MEDICINE CLINIC | Facility: CLINIC | Age: 78
End: 2018-06-18

## 2018-06-18 NOTE — TELEPHONE ENCOUNTER
REFILL ON HYDROcodone-acetaminophen (NORCO)  MG per tablet  methylphenidate (RITALIN) 10 MG tablet     STATED THEY DIDN'T GET THE REFERRAL FOR PAIN MANAGEMENT. PAPERWORK AND REFERRAL NEEDS TO BE FAXED TO PAIN MANAGEMENT

## 2018-06-18 NOTE — TELEPHONE ENCOUNTER
This meds were sent to [facility] per patient request he was asked twice by me and once by Dr. elliott.

## 2018-06-20 RX ORDER — PANTOPRAZOLE SODIUM 40 MG/1
TABLET, DELAYED RELEASE ORAL
Qty: 90 TABLET | Refills: 1 | OUTPATIENT
Start: 2018-06-20

## 2018-06-21 ENCOUNTER — OFFICE VISIT (OUTPATIENT)
Dept: FAMILY MEDICINE CLINIC | Facility: CLINIC | Age: 78
End: 2018-06-21

## 2018-06-21 VITALS
HEART RATE: 74 BPM | SYSTOLIC BLOOD PRESSURE: 132 MMHG | DIASTOLIC BLOOD PRESSURE: 60 MMHG | OXYGEN SATURATION: 94 % | TEMPERATURE: 98.3 F | WEIGHT: 170 LBS | HEIGHT: 71 IN | BODY MASS INDEX: 23.8 KG/M2 | RESPIRATION RATE: 16 BRPM

## 2018-06-21 DIAGNOSIS — G89.29 CHRONIC LOW BACK PAIN WITH SCIATICA, SCIATICA LATERALITY UNSPECIFIED, UNSPECIFIED BACK PAIN LATERALITY: ICD-10-CM

## 2018-06-21 DIAGNOSIS — J41.1 MUCOPURULENT CHRONIC BRONCHITIS (HCC): ICD-10-CM

## 2018-06-21 DIAGNOSIS — J40 BRONCHITIS: Primary | ICD-10-CM

## 2018-06-21 DIAGNOSIS — M15.9 PRIMARY OSTEOARTHRITIS INVOLVING MULTIPLE JOINTS: ICD-10-CM

## 2018-06-21 DIAGNOSIS — M54.40 CHRONIC LOW BACK PAIN WITH SCIATICA, SCIATICA LATERALITY UNSPECIFIED, UNSPECIFIED BACK PAIN LATERALITY: ICD-10-CM

## 2018-06-21 DIAGNOSIS — R06.2 WHEEZING: ICD-10-CM

## 2018-06-21 PROCEDURE — 99214 OFFICE O/P EST MOD 30 MIN: CPT | Performed by: INTERNAL MEDICINE

## 2018-06-21 PROCEDURE — 71046 X-RAY EXAM CHEST 2 VIEWS: CPT | Performed by: INTERNAL MEDICINE

## 2018-06-21 RX ORDER — FLUOXETINE HYDROCHLORIDE 20 MG/1
CAPSULE ORAL
Qty: 90 CAPSULE | Refills: 4 | Status: SHIPPED | OUTPATIENT
Start: 2018-06-21 | End: 2020-01-22

## 2018-06-21 RX ORDER — METHYLPHENIDATE HYDROCHLORIDE 10 MG/1
10 TABLET ORAL DAILY
Qty: 90 TABLET | Refills: 0 | Status: SHIPPED | OUTPATIENT
Start: 2018-06-21 | End: 2018-07-17 | Stop reason: SDUPTHER

## 2018-06-21 RX ORDER — IPRATROPIUM BROMIDE AND ALBUTEROL SULFATE 2.5; .5 MG/3ML; MG/3ML
3 SOLUTION RESPIRATORY (INHALATION)
Status: DISCONTINUED | OUTPATIENT
Start: 2018-06-21 | End: 2018-07-17

## 2018-06-21 RX ORDER — TIZANIDINE HYDROCHLORIDE 6 MG/1
CAPSULE, GELATIN COATED ORAL
Qty: 90 CAPSULE | Refills: 3 | Status: SHIPPED | OUTPATIENT
Start: 2018-06-21 | End: 2019-08-01 | Stop reason: SDUPTHER

## 2018-06-21 RX ORDER — HYDROCODONE BITARTRATE AND ACETAMINOPHEN 10; 325 MG/1; MG/1
TABLET ORAL
Qty: 90 TABLET | Refills: 0 | Status: SHIPPED | OUTPATIENT
Start: 2018-06-21 | End: 2018-07-17 | Stop reason: SDUPTHER

## 2018-06-21 NOTE — PROGRESS NOTES
Subjective   Tony Leonard is a 78 y.o. male.     History of Present Illness   Patient is seen for wheezing and yellowish productive cough.  The wheezing and cough has gotten progressively worse over the last 5 days.  This is been going on for the past 2 weeks.  Patient's x-ray did not show acute pneumonia he does have severe low back pain been trending and pain clinic.  Patient will take his antibiotics and uses present treatment follow-up the office in 1 week patient gets worse patient will return to clinic immediately.    X-Ray  Interpretation report in house X-rays that I personally viewed    Relevant Clinical Issues/Diagnoses/Indications:  Wheezing cough shortness of breath chest x-ray        Clinical Findings:  No acute disease          Comparative Data:  No previous x-ray          Date of Previous X-ray:  Change on current X-ray    Dictated utilizing Dragon dictation. If there are questions or for further clarification, please contact me.     The following portions of the patient's history were reviewed and updated as appropriate: allergies, current medications, past family history, past medical history, past social history, past surgical history and problem list.    Review of Systems   Constitutional: Negative for fatigue and fever.   HENT: Positive for congestion. Negative for trouble swallowing.    Eyes: Negative for discharge and visual disturbance.   Respiratory: Positive for cough, shortness of breath and wheezing. Negative for choking.    Cardiovascular: Negative for chest pain and palpitations.   Gastrointestinal: Negative for abdominal pain and blood in stool.   Endocrine: Negative.    Genitourinary: Negative for genital sores and hematuria.   Musculoskeletal: Positive for back pain. Negative for gait problem and joint swelling.   Skin: Negative for color change, pallor, rash and wound.   Allergic/Immunologic: Positive for environmental allergies. Negative for immunocompromised state.    Neurological: Negative for facial asymmetry and speech difficulty.   Psychiatric/Behavioral: Negative for hallucinations and suicidal ideas.       Objective   Physical Exam   Constitutional: He is oriented to person, place, and time. He appears well-developed and well-nourished.   HENT:   Head: Normocephalic.   Eyes: Conjunctivae are normal. Pupils are equal, round, and reactive to light.   Neck: Normal range of motion. Neck supple.   Cardiovascular: Normal rate, regular rhythm and normal heart sounds.    Pulmonary/Chest: Effort normal. He has wheezes.   Abdominal: Soft. Bowel sounds are normal.   Musculoskeletal: He exhibits edema, tenderness and deformity.   Neurological: He is alert and oriented to person, place, and time.   Skin: Skin is warm and dry.   Psychiatric: He has a normal mood and affect. His behavior is normal. Judgment and thought content normal.   Nursing note and vitals reviewed.      Assessment/Plan   Problems Addressed this Visit        Respiratory    COPD (chronic obstructive pulmonary disease)    Relevant Medications    ipratropium-albuterol (DUO-NEB) nebulizer solution 3 mL (Start on 6/21/2018 12:30 PM)    Other Relevant Orders    XR Chest 2 View (Completed)       Nervous and Auditory    Chronic low back pain with sciatica       Musculoskeletal and Integument    Primary osteoarthritis involving multiple joints      Other Visit Diagnoses     Bronchitis    -  Primary    Wheezing        Relevant Medications    ipratropium-albuterol (DUO-NEB) nebulizer solution 3 mL (Start on 6/21/2018 12:30 PM)    Other Relevant Orders    XR Chest 2 View (Completed)

## 2018-07-17 ENCOUNTER — OFFICE VISIT (OUTPATIENT)
Dept: FAMILY MEDICINE CLINIC | Facility: CLINIC | Age: 78
End: 2018-07-17

## 2018-07-17 VITALS
HEART RATE: 75 BPM | RESPIRATION RATE: 16 BRPM | OXYGEN SATURATION: 96 % | HEIGHT: 71 IN | DIASTOLIC BLOOD PRESSURE: 58 MMHG | TEMPERATURE: 98.3 F | BODY MASS INDEX: 23.38 KG/M2 | SYSTOLIC BLOOD PRESSURE: 100 MMHG | WEIGHT: 167 LBS

## 2018-07-17 DIAGNOSIS — J47.0 BRONCHIECTASIS WITH ACUTE LOWER RESPIRATORY INFECTION (HCC): ICD-10-CM

## 2018-07-17 DIAGNOSIS — G89.29 CHRONIC LOW BACK PAIN WITH SCIATICA, SCIATICA LATERALITY UNSPECIFIED, UNSPECIFIED BACK PAIN LATERALITY: Primary | ICD-10-CM

## 2018-07-17 DIAGNOSIS — M54.40 CHRONIC LOW BACK PAIN WITH SCIATICA, SCIATICA LATERALITY UNSPECIFIED, UNSPECIFIED BACK PAIN LATERALITY: Primary | ICD-10-CM

## 2018-07-17 DIAGNOSIS — I50.32 CHRONIC DIASTOLIC CHF (CONGESTIVE HEART FAILURE) (HCC): ICD-10-CM

## 2018-07-17 DIAGNOSIS — E55.9 VITAMIN D DEFICIENCY: ICD-10-CM

## 2018-07-17 PROCEDURE — 99214 OFFICE O/P EST MOD 30 MIN: CPT | Performed by: INTERNAL MEDICINE

## 2018-07-17 RX ORDER — METHYLPHENIDATE HYDROCHLORIDE 10 MG/1
10 TABLET ORAL DAILY
Qty: 90 TABLET | Refills: 0 | Status: SHIPPED | OUTPATIENT
Start: 2018-07-17 | End: 2018-10-16 | Stop reason: SDUPTHER

## 2018-07-17 RX ORDER — ALBUTEROL SULFATE 2.5 MG/3ML
2.5 SOLUTION RESPIRATORY (INHALATION)
Qty: 360 ML | Refills: 3 | Status: ON HOLD | OUTPATIENT
Start: 2018-07-17 | End: 2022-11-19 | Stop reason: SDUPTHER

## 2018-07-17 RX ORDER — HYDROCODONE BITARTRATE AND ACETAMINOPHEN 10; 325 MG/1; MG/1
TABLET ORAL
Qty: 90 TABLET | Refills: 0 | Status: SHIPPED | OUTPATIENT
Start: 2018-07-17 | End: 2019-01-18 | Stop reason: SDUPTHER

## 2018-07-17 RX ORDER — CETIRIZINE HYDROCHLORIDE 10 MG/1
10 TABLET ORAL DAILY PRN
Qty: 90 TABLET | Refills: 3 | Status: ON HOLD | OUTPATIENT
Start: 2018-07-17 | End: 2019-05-10

## 2018-07-17 RX ORDER — IBUPROFEN 200 MG
200 TABLET ORAL EVERY 6 HOURS PRN
Status: ON HOLD | COMMUNITY
End: 2019-05-10

## 2018-07-17 RX ORDER — BUDESONIDE 0.5 MG/2ML
1 INHALANT ORAL
Qty: 120 EACH | Refills: 2 | Status: SHIPPED | OUTPATIENT
Start: 2018-07-17 | End: 2018-07-18 | Stop reason: SDUPTHER

## 2018-07-17 NOTE — PROGRESS NOTES
Subjective   Tony Leonard is a 78 y.o. male.     History of Present Illness   Patient was seen for chronic low back pain.  Patient is taking hydrocodone and Zanaflex with partial relief is when you get to the pain clinic to adjust his medication.The patient has read and signed the [facility] Controlled Substance Contract.  I will continue to see patient for regular follow up appointments.  They are well controlled on their medication.  NEY has been reviewed by me and is updated every 3 months. The patient is aware of the potential for addiction and dependence.  He does have bronchiectasis been stable over the past 6 months.  He does see a pulmonologist a regular basis.  His congestive heart failure also is been stable she's not had episodes of weight gain or shortness of breath.    Dictated utilizing Dragon dictation. If there are questions or for further clarification, please contact me.   The following portions of the patient's history were reviewed and updated as appropriate: allergies, current medications, past family history, past medical history, past social history, past surgical history and problem list.    Review of Systems   Constitutional: Negative for fatigue and fever.   HENT: Positive for congestion. Negative for trouble swallowing.    Eyes: Negative for discharge and visual disturbance.   Respiratory: Negative for choking and shortness of breath.    Cardiovascular: Negative for chest pain and palpitations.   Gastrointestinal: Negative for abdominal pain and blood in stool.   Endocrine: Negative.    Genitourinary: Negative for genital sores and hematuria.   Musculoskeletal: Positive for back pain. Negative for gait problem and joint swelling.   Skin: Negative for color change, pallor, rash and wound.   Allergic/Immunologic: Positive for environmental allergies. Negative for immunocompromised state.   Neurological: Negative for facial asymmetry and speech difficulty.   Psychiatric/Behavioral:  Negative for hallucinations and suicidal ideas.       Objective   Physical Exam   Constitutional: He is oriented to person, place, and time. He appears well-developed and well-nourished.   HENT:   Head: Normocephalic.   Eyes: Conjunctivae are normal. Pupils are equal, round, and reactive to light.   Neck: Normal range of motion. Neck supple.   Cardiovascular: Normal rate, regular rhythm and normal heart sounds.    Pulmonary/Chest: Effort normal and breath sounds normal.   Abdominal: Soft. Bowel sounds are normal.   Musculoskeletal: Normal range of motion. He exhibits edema, tenderness and deformity.   Neurological: He is alert and oriented to person, place, and time.   Skin: Skin is warm and dry.   Psychiatric: He has a normal mood and affect. His behavior is normal. Judgment and thought content normal.   Nursing note and vitals reviewed.      Assessment/Plan   Problems Addressed this Visit        Cardiovascular and Mediastinum    Chronic diastolic CHF (congestive heart failure) (CMS/HCC)    Relevant Orders    Comprehensive Metabolic Panel    CBC & Differential    Lipid Panel    Compliance Drug Analysis, Ur - Urine, Clean Catch    Vitamin D 25 Hydroxy    proBNP       Respiratory    Bronchiectasis with acute lower respiratory infection (CMS/[facility])    Relevant Medications    budesonide (PULMICORT) 0.5 MG/2ML nebulizer solution    albuterol (PROVENTIL) (2.5 MG/3ML) 0.083% nebulizer solution    cetirizine (zyrTEC) 10 MG tablet    Other Relevant Orders    Comprehensive Metabolic Panel    CBC & Differential    Lipid Panel    Compliance Drug Analysis, Ur - Urine, Clean Catch    Vitamin D 25 Hydroxy       Nervous and Auditory    Chronic low back pain with sciatica - Primary    Relevant Orders    Ambulatory Referral to Physical Therapy Evaluate and treat, Ortho (Completed)    Comprehensive Metabolic Panel    CBC & Differential    Lipid Panel    Compliance Drug Analysis, Ur - Urine, Clean Catch    Vitamin D 25 Hydroxy       Other Visit Diagnoses     Vitamin D deficiency         Relevant Orders    Vitamin D 25 Hydroxy

## 2018-07-18 RX ORDER — BUDESONIDE 0.5 MG/2ML
INHALANT ORAL
Qty: 120 EACH | Refills: 2 | Status: SHIPPED | OUTPATIENT
Start: 2018-07-18 | End: 2018-09-18 | Stop reason: SDUPTHER

## 2018-07-19 ENCOUNTER — LAB (OUTPATIENT)
Dept: FAMILY MEDICINE CLINIC | Facility: CLINIC | Age: 78
End: 2018-07-19

## 2018-07-19 DIAGNOSIS — I50.32 CHRONIC DIASTOLIC CHF (CONGESTIVE HEART FAILURE) (HCC): ICD-10-CM

## 2018-07-19 DIAGNOSIS — G89.29 CHRONIC LOW BACK PAIN WITH SCIATICA, SCIATICA LATERALITY UNSPECIFIED, UNSPECIFIED BACK PAIN LATERALITY: ICD-10-CM

## 2018-07-19 DIAGNOSIS — J47.0 BRONCHIECTASIS WITH ACUTE LOWER RESPIRATORY INFECTION (HCC): ICD-10-CM

## 2018-07-19 DIAGNOSIS — M54.40 CHRONIC LOW BACK PAIN WITH SCIATICA, SCIATICA LATERALITY UNSPECIFIED, UNSPECIFIED BACK PAIN LATERALITY: ICD-10-CM

## 2018-07-19 DIAGNOSIS — E55.9 VITAMIN D DEFICIENCY: ICD-10-CM

## 2018-07-19 LAB
25(OH)D3 SERPL-MCNC: 39.6 NG/ML (ref 30–100)
ALBUMIN SERPL-MCNC: 4 G/DL (ref 3.5–5.2)
ALBUMIN/GLOB SERPL: 1.5 G/DL
ALP SERPL-CCNC: 67 U/L (ref 39–117)
ALT SERPL W P-5'-P-CCNC: 11 U/L (ref 1–41)
ANION GAP SERPL CALCULATED.3IONS-SCNC: 9.7 MMOL/L
AST SERPL-CCNC: 13 U/L (ref 1–40)
BILIRUB SERPL-MCNC: 0.7 MG/DL (ref 0.1–1.2)
BUN BLD-MCNC: 19 MG/DL (ref 8–23)
BUN/CREAT SERPL: 20.7 (ref 7–25)
CALCIUM SPEC-SCNC: 8.9 MG/DL (ref 8.6–10.5)
CHLORIDE SERPL-SCNC: 102 MMOL/L (ref 98–107)
CHOLEST SERPL-MCNC: 197 MG/DL (ref 0–200)
CO2 SERPL-SCNC: 27.3 MMOL/L (ref 22–29)
CREAT BLD-MCNC: 0.92 MG/DL (ref 0.76–1.27)
ERYTHROCYTE [DISTWIDTH] IN BLOOD BY AUTOMATED COUNT: 15.1 % (ref 4.5–15)
GFR SERPL CREATININE-BSD FRML MDRD: 79 ML/MIN/1.73
GLOBULIN UR ELPH-MCNC: 2.6 GM/DL
GLUCOSE BLD-MCNC: 87 MG/DL (ref 65–99)
HCT VFR BLD AUTO: 39.3 % (ref 35–60)
HDLC SERPL-MCNC: 52 MG/DL (ref 40–60)
HGB BLD-MCNC: 13.1 G/DL (ref 13.5–18)
LDLC SERPL CALC-MCNC: 131 MG/DL (ref 0–100)
LDLC/HDLC SERPL: 2.52 {RATIO}
LYMPHOCYTES # BLD AUTO: 2.3 10*3/MM3 (ref 1.2–3.4)
LYMPHOCYTES NFR BLD AUTO: 29.9 % (ref 21–51)
MCH RBC QN AUTO: 30.9 PG (ref 26.1–33.1)
MCHC RBC AUTO-ENTMCNC: 33.4 G/DL (ref 33–37)
MCV RBC AUTO: 92.5 FL (ref 80–99)
MONOCYTES # BLD AUTO: 0.2 10*3/MM3 (ref 0.1–0.6)
MONOCYTES NFR BLD AUTO: 2.7 % (ref 2–9)
NEUTROPHILS # BLD AUTO: 5.1 10*3/MM3 (ref 1.4–6.5)
NEUTROPHILS NFR BLD AUTO: 67.4 % (ref 42–75)
NT-PROBNP SERPL-MCNC: 168.5 PG/ML (ref 0–1800)
PLATELET # BLD AUTO: 280 10*3/MM3 (ref 150–450)
PMV BLD AUTO: 7.5 FL (ref 7.1–10.5)
POTASSIUM BLD-SCNC: 4.4 MMOL/L (ref 3.5–5.2)
PROT SERPL-MCNC: 6.6 G/DL (ref 6–8.5)
RBC # BLD AUTO: 4.25 10*6/MM3 (ref 4–6)
SODIUM BLD-SCNC: 139 MMOL/L (ref 136–145)
TRIGL SERPL-MCNC: 71 MG/DL (ref 0–150)
VLDLC SERPL-MCNC: 14.2 MG/DL (ref 5–40)
WBC NRBC COR # BLD: 7.6 10*3/MM3 (ref 4.5–10)

## 2018-07-19 PROCEDURE — 80061 LIPID PANEL: CPT | Performed by: INTERNAL MEDICINE

## 2018-07-19 PROCEDURE — 82306 VITAMIN D 25 HYDROXY: CPT | Performed by: INTERNAL MEDICINE

## 2018-07-19 PROCEDURE — 85025 COMPLETE CBC W/AUTO DIFF WBC: CPT | Performed by: INTERNAL MEDICINE

## 2018-07-19 PROCEDURE — 80053 COMPREHEN METABOLIC PANEL: CPT | Performed by: INTERNAL MEDICINE

## 2018-07-19 PROCEDURE — 83880 ASSAY OF NATRIURETIC PEPTIDE: CPT | Performed by: INTERNAL MEDICINE

## 2018-07-19 PROCEDURE — 36415 COLL VENOUS BLD VENIPUNCTURE: CPT | Performed by: INTERNAL MEDICINE

## 2018-07-19 RX ORDER — TRIAMCINOLONE ACETONIDE 1 MG/G
CREAM TOPICAL 2 TIMES DAILY PRN
Qty: 30 G | Refills: 2 | Status: ON HOLD | OUTPATIENT
Start: 2018-07-19 | End: 2019-05-10

## 2018-07-26 LAB — CONV REPORT SUMMARY: NORMAL

## 2018-07-31 ENCOUNTER — TELEPHONE (OUTPATIENT)
Dept: GASTROENTEROLOGY | Facility: CLINIC | Age: 78
End: 2018-07-31

## 2018-09-17 ENCOUNTER — TELEPHONE (OUTPATIENT)
Dept: FAMILY MEDICINE CLINIC | Facility: CLINIC | Age: 78
End: 2018-09-17

## 2018-09-17 RX ORDER — ROPINIROLE 0.5 MG/1
0.5 TABLET, FILM COATED ORAL NIGHTLY
Qty: 90 TABLET | Refills: 3 | Status: SHIPPED | OUTPATIENT
Start: 2018-09-17 | End: 2019-02-27 | Stop reason: SDUPTHER

## 2018-09-17 NOTE — TELEPHONE ENCOUNTER
----- Message from Dejah Manjarrez MA sent at 9/17/2018  1:57 PM EDT -----      ----- Message -----  From: Brianda Arguello  Sent: 9/17/2018   1:53 PM  To: Babs Zayas [facility]

## 2018-09-18 RX ORDER — BUDESONIDE 0.5 MG/2ML
INHALANT ORAL
Qty: 120 EACH | Refills: 3 | Status: SHIPPED | OUTPATIENT
Start: 2018-09-18 | End: 2019-08-01 | Stop reason: SDUPTHER

## 2018-09-20 ENCOUNTER — OFFICE VISIT (OUTPATIENT)
Dept: GASTROENTEROLOGY | Facility: CLINIC | Age: 78
End: 2018-09-20

## 2018-09-20 VITALS
HEIGHT: 71 IN | BODY MASS INDEX: 24.33 KG/M2 | DIASTOLIC BLOOD PRESSURE: 70 MMHG | SYSTOLIC BLOOD PRESSURE: 120 MMHG | WEIGHT: 173.8 LBS

## 2018-09-20 DIAGNOSIS — R10.30 LOWER ABDOMINAL PAIN: ICD-10-CM

## 2018-09-20 DIAGNOSIS — K59.00 CONSTIPATION, UNSPECIFIED CONSTIPATION TYPE: ICD-10-CM

## 2018-09-20 DIAGNOSIS — K21.9 GASTROESOPHAGEAL REFLUX DISEASE, ESOPHAGITIS PRESENCE NOT SPECIFIED: Primary | ICD-10-CM

## 2018-09-20 PROCEDURE — 99214 OFFICE O/P EST MOD 30 MIN: CPT | Performed by: INTERNAL MEDICINE

## 2018-09-20 NOTE — PROGRESS NOTES
Chief Complaint   Patient presents with   • Abdominal Pain   • Heartburn        Tony Leonard is a  78 y.o. male here for a follow up visit for GERD, abdominal pain, constipation    HPI this 78-year-old white male patient of Dr. Erich Aviles returns since last seen in this office December 2016.  He recounts multiple hospital admissions over the past year and a half for his COPD.  He had experienced some mild lower abdominal pain that was attributed to constipation.  This has improved once he stopped taking Norco and Zanaflex.  He has resume these medications but uses them on an as-needed basis.  He has complaints of reflux and burning in his chest.  On further questioning he admits to stopping his Protonix.  I advised him to resume it to see if he could improve his reflux symptoms.  He will do this and call with a progress report in 6-8 weeks.  We talked about his previous endoscopic evaluations and given his underlying medical condition I would defer examinations at this time.  He did report some sense of urgency with bowel function on occasion as well as excess flatus.  We talked about dietary influences that may contribute to this.    Past Medical History:   Diagnosis Date   • ADHD (attention deficit hyperactivity disorder)    • Bronchiectasis (CMS/[facility])    • Colon polyp    • COPD (chronic obstructive pulmonary disease) (CMS/[facility])    • Diverticulosis    • On home oxygen therapy    • Pneumonia        Current Outpatient Prescriptions   Medication Sig Dispense Refill   • albuterol (PROVENTIL) (2.5 MG/3ML) 0.083% nebulizer solution Take 2.5 mg by nebulization 4 (Four) Times a Day. 360 mL 3   • apixaban (ELIQUIS) 2.5 MG tablet tablet Take 2.5 mg by mouth 2 (Two) Times a Day.     • aspirin 81 MG EC tablet Take 81 mg by mouth Daily.     • budesonide (PULMICORT) 0.5 MG/2ML nebulizer solution 1 vial only twice daily 120 each 3   • cetirizine (zyrTEC) 10 MG tablet Take 1 tablet by mouth Daily As Needed (itching). 90  tablet 3   • cholecalciferol (VITAMIN D3) 1000 units tablet Take 1,000 Units by mouth Daily.     • clotrimazole-betamethasone (LOTRISONE) 1-0.05 % cream      • FLUoxetine (PROZAC) 20 MG capsule 3 tablets once daily 90 capsule 4   • furosemide (LASIX) 20 MG tablet Take 1 tablet by mouth Daily. Take with potassium 30 tablet 1   • HYDROcodone-acetaminophen (NORCO)  MG per tablet 1 by mouth 3 times a day when necessary chronic severe low back pain 90 tablet 0   • ibuprofen (ADVIL,MOTRIN) 200 MG tablet Take 200 mg by mouth Every 6 (Six) Hours As Needed for Mild Pain .     • magnesium hydroxide (MILK OF MAGNESIA) 2400 MG/10ML suspension suspension Take 10 mL by mouth Daily As Needed (constipation). 10 mL    • methylphenidate (RITALIN) 10 MG tablet Take 1 tablet by mouth Daily. For ADD 3 months 90 tablet 0   • Multiple Vitamins-Minerals (MULTIVITAMIN ADULT PO) Take 1 tablet by mouth Daily.     • pantoprazole (PROTONIX) 40 MG EC tablet Take 1 tablet by mouth Daily. 30 tablet 6   • rOPINIRole (REQUIP) 0.5 MG tablet Take 1 tablet by mouth Every Night. Take 0.5mg 1 hour prior to bedtime, may repeat if needed Max of 1mg total dose 90 tablet 3   • Sennosides-Docusate Sodium (SENNA PLUS PO) Take  by mouth.     • sodium chloride 7 % nebulizer solution nebulizer solution      • tamsulosin (FLOMAX) 0.4 MG capsule 24 hr capsule Take 2 capsules by mouth Daily. 60 capsule 0   • TiZANidine (ZANAFLEX) 6 MG capsule 1 po tid prn muscle spasm do not drive 90 capsule 3   • triamcinolone (KENALOG) 0.1 % cream Apply  topically 2 (Two) Times a Day As Needed for Rash. Do not use more than one week then needs to BE off for a week 30 g 2   • umeclidinium-vilanterol (ANORO ELLIPTA) 62.5-25 MCG/INH aerosol powder  inhaler Inhale 1 puff Daily.     • vitamin B-12 (CYANOCOBALAMIN) 2500 MCG sublingual tablet tablet Take 2,500 mcg by mouth Daily.     • predniSONE (DELTASONE) 20 MG tablet Bid for 3 days, then daily for 4 days, then stop 10 tablet 0      No current facility-administered medications for this visit.        PRN Meds:.    Allergies   Allergen Reactions   • Latex Rash   • Sulfa Antibiotics Rash       Social History     Social History   • Marital status: Single     Spouse name: N/A   • Number of children: N/A   • Years of education: N/A     Occupational History   • Not on file.     Social History Main Topics   • Smoking status: Never Smoker   • Smokeless tobacco: Never Used   • Alcohol use No      Comment: red wine occassional   • Drug use: No   • Sexual activity: Defer     Other Topics Concern   • Not on file     Social History Narrative   • No narrative on file       Family History   Problem Relation Age of Onset   • Thyroid disease Mother    • No Known Problems Father        Review of Systems   Constitutional: Negative for activity change, appetite change, fatigue and unexpected weight change.   HENT: Negative for congestion, facial swelling, sore throat, trouble swallowing and voice change.    Eyes: Negative for photophobia and visual disturbance.   Respiratory: Negative for cough and choking.    Cardiovascular: Negative for chest pain.   Gastrointestinal: Positive for abdominal pain and constipation. Negative for abdominal distention, anal bleeding, blood in stool, diarrhea, nausea, rectal pain and vomiting.        GERD   Endocrine: Negative for polyphagia.   Musculoskeletal: Positive for arthralgias and back pain. Negative for gait problem and joint swelling.   Skin: Negative for color change, pallor and rash.   Allergic/Immunologic: Negative for food allergies.   Neurological: Negative for speech difficulty and headaches.   Hematological: Does not bruise/bleed easily.   Psychiatric/Behavioral: Negative for agitation, confusion and sleep disturbance.       Vitals:    09/20/18 1556   BP: 120/70       Physical Exam   Constitutional: He is oriented to person, place, and time. He appears well-developed and well-nourished.   HENT:   Head:  Normocephalic.   Mouth/Throat: Oropharynx is clear and moist.   Eyes: Conjunctivae and EOM are normal.   Neck: Normal range of motion.   Cardiovascular: Normal rate and regular rhythm.    Pulmonary/Chest: Breath sounds normal.   Abdominal: Soft. Bowel sounds are normal.   Musculoskeletal: Normal range of motion.   Neurological: He is alert and oriented to person, place, and time.   Skin: Skin is warm and dry.   Psychiatric: He has a normal mood and affect. His behavior is normal.       ASSESSMENT   #1 GERD: Off PPI  #2 lower abdominal pain: Better off narcotic analgesics  #3 constipation      PLAN  Advise patient resume his PPI  He will call with a progress report in 4-6 weeks.      ICD-10-CM ICD-9-CM   1. Gastroesophageal reflux disease, esophagitis presence not specified K21.9 530.81   2. Lower abdominal pain R10.30 789.09   3. Constipation, unspecified constipation type K59.00 564.00

## 2018-10-09 ENCOUNTER — TELEPHONE (OUTPATIENT)
Dept: FAMILY MEDICINE CLINIC | Facility: CLINIC | Age: 78
End: 2018-10-09

## 2018-10-10 RX ORDER — PANTOPRAZOLE SODIUM 40 MG/1
40 TABLET, DELAYED RELEASE ORAL DAILY
Qty: 90 TABLET | Refills: 3 | Status: SHIPPED | OUTPATIENT
Start: 2018-10-10 | End: 2019-04-16 | Stop reason: SDUPTHER

## 2018-10-11 ENCOUNTER — EPISODE CHANGES (OUTPATIENT)
Dept: CASE MANAGEMENT | Facility: OTHER | Age: 78
End: 2018-10-11

## 2018-10-11 ENCOUNTER — TELEPHONE (OUTPATIENT)
Dept: FAMILY MEDICINE CLINIC | Facility: CLINIC | Age: 78
End: 2018-10-11

## 2018-10-11 NOTE — TELEPHONE ENCOUNTER
Pt  STS THAT HE NEEDS METHYLPHENIDATE (RITALIN 10 MG ) HAND WRITTEN HE WILL  [phone]) PLEASE CALL WHEN READY

## 2018-10-16 ENCOUNTER — TELEPHONE (OUTPATIENT)
Dept: FAMILY MEDICINE CLINIC | Facility: CLINIC | Age: 78
End: 2018-10-16

## 2018-10-16 RX ORDER — METHYLPHENIDATE HYDROCHLORIDE 10 MG/1
10 TABLET ORAL DAILY
Qty: 90 TABLET | Refills: 0 | Status: SHIPPED | OUTPATIENT
Start: 2018-10-16 | End: 2019-01-14 | Stop reason: SDUPTHER

## 2018-10-16 NOTE — TELEPHONE ENCOUNTER
PT STATES HE CALLED LAST WEEK FOR A REFILL ON HIS RITALIN, AND ASKED FOR IT TO BE PRINTED SO HE CAN MAIL IT IN

## 2018-11-17 ENCOUNTER — HOSPITAL ENCOUNTER (EMERGENCY)
Facility: HOSPITAL | Age: 78
Discharge: HOME | End: 2018-11-17
Attending: EMERGENCY MEDICINE | Admitting: EMERGENCY MEDICINE

## 2018-11-17 VITALS
OXYGEN SATURATION: 97 % | TEMPERATURE: 99 F | RESPIRATION RATE: 15 BRPM | WEIGHT: 179 LBS | HEIGHT: 71 IN | SYSTOLIC BLOOD PRESSURE: 120 MMHG | BODY MASS INDEX: 25.06 KG/M2 | HEART RATE: 92 BPM | DIASTOLIC BLOOD PRESSURE: 66 MMHG

## 2018-11-17 DIAGNOSIS — H91.91 HEARING PROBLEM OF RIGHT EAR: Primary | ICD-10-CM

## 2018-11-17 PROCEDURE — 99282 EMERGENCY DEPT VISIT SF MDM: CPT

## 2018-11-17 NOTE — DISCHARGE INSTRUCTIONS
Continue current home medications  Nothing in your ears  Follow up with PMD and ENT- call Monday for appointment  Return to er for fever, chills, ear pain, or any new or worsening symptoms

## 2018-11-17 NOTE — ED PROVIDER NOTES
EMERGENCY DEPARTMENT ENCOUNTER    CHIEF COMPLAINT  Chief Complaint: trouble hearing from right ear  History given by: patient  History limited by: N/A  Time Seen: 1444  Room Number: 37/37  PMD: Erich Aviles MD      HPI:  Pt is a 78 y.o. male who presents with trouble hearing from right ear for about 5 days. He also reports having right ear congestion, cough, and sinus congestion. 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 He notes that he is currently taking zpack for bronchiectasis. Past Medical History of COPD (on 2L supplemental O2 nasal cannula). Pt has no other complaints at this time.     Duration: for about 5 days   Timing: constant  Location: right ear  Radiation: none  Quality: none  Intensity/Severity: moderate  Progression: unchanged  Associated Symptoms: right ear congestion, cough, sinus congestion  Aggravating Factors: none  Alleviating Factors: none  Previous Episodes: Pt states that he has chronic hearing loss to left ear.   Treatment before arrival: none mentioned    PAST MEDICAL HISTORY  Active Ambulatory Problems     Diagnosis Date Noted   • Thrush, oral 03/11/2016   • ADD (attention deficit disorder) 03/11/2016   • Hemorrhoids 03/11/2016   • Bronchiectasis with acute lower respiratory infection (CMS/HCC) 03/11/2016   • Diverticul disease small and large intestine, no perforati or abscess 03/11/2016   • Benign non-nodular prostatic hyperplasia without lower urinary tract symptoms 03/11/2016   • Gastroesophageal reflux disease 03/11/2016   • Malaise and fatigue 03/11/2016   • Environmental allergies 04/25/2016   • Hemoptysis 04/27/2016   • Dyslipidemia 05/11/2016   • Primary osteoarthritis involving multiple joints 05/11/2016   • Pneumonia of left upper lobe due to infectious organism (CMS/HCC) 10/03/2016   • Abnormal EKG 10/04/2016   • COPD (chronic  obstructive pulmonary disease) (CMS/HCC) 10/04/2016   • Mild malnutrition (CMS/[facility]) 03/28/2017   • Acute on chronic respiratory failure with hypoxia (CMS/HCC) 04/27/2017   • Fracture, intertrochanteric, right femur, closed, initial encounter (CMS/HCC) 01/16/2018   • Chronic diastolic CHF (congestive heart failure) (CMS/[facility]) 01/16/2018   • Hematoma of frontal scalp 01/16/2018   • Postoperative anemia due to acute blood loss 01/19/2018   • Urinary retention 01/25/2018   • Hemorrhagic disorder due to circulating anticoagulants (CMS/[facility]) 01/29/2018   • History of fracture of right hip 02/22/2018   • Closed fracture of right hip with routine healing 02/28/2018   • COPD exacerbation (CMS/HCC) 04/16/2018   • Chronic low back pain with sciatica 06/21/2018     Resolved Ambulatory Problems     Diagnosis Date Noted   • Pneumonia of both lower lobes due to infectious organism (CMS/HCC) 03/11/2016   • Pneumonia of right upper lobe due to infectious organism (CMS/HCC) 04/22/2016   • COPD exacerbation (CMS/HCC) 04/25/2016   • GERD (gastroesophageal reflux disease) 04/25/2016   • Chronic respiratory failure with hypoxia (CMS/HCC) 10/04/2016   • Bacterial lobar pneumonia 12/27/2016   • Pneumonia of left lower lobe due to infectious organism (CMS/HCC) 03/26/2017   • Bronchitis 06/01/2017   • COPD exacerbation (CMS/[facility]) 06/17/2017   • Leukocytosis 01/16/2018   • Right upper lobe pneumonia (CMS/HCC) 01/20/2018   • Mucus plugging of bronchi 01/16/2018     Past Medical History:   Diagnosis Date   • ADHD (attention deficit hyperactivity disorder)    • Bronchiectasis (CMS/[facility])    • Colon polyp    • COPD (chronic obstructive pulmonary disease) (CMS/[facility])    • Diverticulosis    • On home oxygen therapy    • Pneumonia        PAST SURGICAL HISTORY  Past Surgical History:   Procedure Laterality Date   • COLONOSCOPY  05/17/2013    eh, ih, tort, sig tics   • ENDOSCOPY  03/19/2015    z line irreg, hh   • HERNIA REPAIR     • SPINE SURGERY     •  TONSILLECTOMY         FAMILY HISTORY  Family History   Problem Relation Age of Onset   • Thyroid disease Mother    • No Known Problems Father        SOCIAL HISTORY  Social History     Socioeconomic History   • Marital status: Single     Spouse name: Not on file   • Number of children: Not on file   • Years of education: Not on file   • Highest education level: Not on file   Social Needs   • Financial resource strain: Not on file   • Food insecurity - worry: Not on file   • Food insecurity - inability: Not on file   • Transportation needs - medical: Not on file   • Transportation needs - non-medical: Not on file   Occupational History   • Not on file   Tobacco Use   • Smoking status: Never Smoker   • Smokeless tobacco: Never Used   Substance and Sexual Activity   • Alcohol use: No     Comment: red wine occassional   • Drug use: No   • Sexual activity: Defer   Other Topics Concern   • Not on file   Social History Narrative   • Not on file         ALLERGIES  Latex and Sulfa antibiotics    REVIEW OF SYSTEMS  Review of Systems   Constitutional: Negative for chills and fever.   HENT: Positive for congestion (right ear congestion, sinus congestion) and hearing loss (trouble hearing out of right ear). Negative for sore throat.    Respiratory: Positive for cough. Shortness of breath: chronic, no acute change.    Cardiovascular: Negative for chest pain.   Gastrointestinal: Negative for nausea and vomiting.   Genitourinary: Negative for dysuria.   Musculoskeletal: Negative for back pain.   Skin: Negative for rash.   Neurological: Negative for dizziness.   Psychiatric/Behavioral: The patient is not nervous/anxious.        PHYSICAL EXAM  ED Triage Vitals [11/17/18 1434]   Temp Heart Rate Resp BP SpO2   99 °F (37.2 °C) 92 15 120/66 97 % WNL     Physical Exam   Constitutional: He is oriented to person, place, and time. No distress.   HENT:   Head: Atraumatic.   Right Ear: Tympanic membrane is not injected. A middle ear effusion is  present. Decreased hearing is noted.   Left Ear: Tympanic membrane and ear canal normal.   Mouth/Throat: Mucous membranes are normal.   No cerumen impaction to bilateral ears   Eyes: No scleral icterus.   Neck: Normal range of motion.   Cardiovascular: Normal rate, regular rhythm and normal heart sounds.   Pulmonary/Chest: Effort normal. He has no decreased breath sounds. He has no wheezes. He has rhonchi (mild). He has no rales.   Musculoskeletal: Normal range of motion.   Neurological: He is alert and oriented to person, place, and time. He has normal motor skills and normal sensation.   Skin: Skin is warm and dry.   Psychiatric: Mood and affect normal.   Nursing note and vitals reviewed.          PROGRESS AND CONSULTS  1526- Reviewed pt's history and workup with Dr. Miramontes.  At bedside evaluation, they agree with the plan of care.    1531- Discussed with pt about no cerumen impaction to right ear on exam. Instructed pt to not insert any foreign objects into his ears. Provided discharge instructions for hearing loss.  Reviewed implications of results, diagnosis, meds, responsibility to follow up, warning signs and symptoms of possible worsening, potential complications and reasons to return to ER with patient.  Discussed all results and noted any abnormalities with patient.  Discussed absolute need to follow up closely with PMD and referred ENT specialist for recheck of condition and abnormalities and for further management (instructed pt to call their offices in 2 days to arrange close follow up appointments).   Discussed plan for discharge, as there is no emergent indication for admission.  Pt is agreeable and understands need for follow up and repeat testing.  Pt is aware that discharge does not mean that nothing is wrong but it indicates no emergency is present.  Pt is discharged with instructions to follow up with primary care doctor to have their blood pressure rechecked.       DIAGNOSIS  Final diagnoses:  "  Hearing problem of right ear       FOLLOW UP   Erich Aviles MD  [address]  [facility] [address]  [phone]    Call in 2 days      Martin Mcclain MD  [address]  [facility] [id number] [phone]    Call in 2 days          MEDICATIONS GIVEN IN ER  Medications - No data to display    /66 (BP Location: Right arm, Patient Position: Lying)   Pulse 92   Temp 99 °F (37.2 °C)   Resp 15   Ht 180.3 cm (71\")   Wt 81.2 kg (179 lb)   SpO2 97%   BMI 24.97 kg/m²       I personally reviewed the past medical history, past surgical history, social history, family history, current medications and allergies as they appear in this chart.  The scribe's note accurately reflects the work and decisions made by me.     Documentation assistance provided by shan To for EVANGELISTA Bailey on 11/17/2018 at 2:44 PM. Information recorded by the scribe was done at my direction and has been verified and validated by me.       Joaquní To  11/17/18 1546       Deneen Her, APRN  11/17/18 6808    "

## 2018-11-17 NOTE — ED PROVIDER NOTES
Pt presents to the ED c/o difficulty hearing out of his R ear for the last 5 days. Pt denies HA, fever, chills, and vertigo.    On exam,   Right TM is normal and external canal is normal, no mastoid tenderness.      Plan: Advised pt f/u with ENT.     Attestation:  The GUILHERME and I have discussed this patient's history, physical exam, and treatment plan.  I have reviewed the documentation and personally had a face to face interaction with the patient. I affirm the documentation and agree with the treatment and plan.  The attached note describes my personal findings.      Documentation assistance provided by shan Monroe for Dr. Miramontes. Information recorded by the shan was done at my direction and has been verified and validated by me.     Maria T Monroe  11/17/18 8026       Andrzej Miramontes MD  11/17/18 6603

## 2018-11-26 ENCOUNTER — TELEPHONE (OUTPATIENT)
Dept: FAMILY MEDICINE CLINIC | Facility: CLINIC | Age: 78
End: 2018-11-26

## 2018-12-14 ENCOUNTER — OFFICE VISIT (OUTPATIENT)
Dept: FAMILY MEDICINE CLINIC | Facility: CLINIC | Age: 78
End: 2018-12-14

## 2018-12-14 VITALS
BODY MASS INDEX: 24.36 KG/M2 | RESPIRATION RATE: 15 BRPM | OXYGEN SATURATION: 96 % | SYSTOLIC BLOOD PRESSURE: 112 MMHG | DIASTOLIC BLOOD PRESSURE: 64 MMHG | HEART RATE: 64 BPM | TEMPERATURE: 97.9 F | WEIGHT: 174 LBS | HEIGHT: 71 IN

## 2018-12-14 DIAGNOSIS — D22.9 NEVUS: Primary | ICD-10-CM

## 2018-12-14 PROCEDURE — 99213 OFFICE O/P EST LOW 20 MIN: CPT | Performed by: INTERNAL MEDICINE

## 2018-12-14 NOTE — PROGRESS NOTES
Subjective   Tony Leonard is a 78 y.o. male.     History of Present Illness   A she was seen for irritated nevus.  She had severe itching and was making them bleed.  He was advised to take Benadryl but was advised to continue make him drowsy.  He was also given Bactroban and placed on the wounds.  He was also referred to dermatology for evaluation.  Patient has severe nail fungus with ingrown nails and was referred to a podiatrist.    Dictated utilizing Dragon dictation. If there are questions or for further clarification, please contact me.   The following portions of the patient's history were reviewed and updated as appropriate: allergies, current medications, past family history, past medical history, past social history, past surgical history and problem list.    Review of Systems   Constitutional: Negative for fatigue and fever.   HENT: Positive for congestion. Negative for trouble swallowing.    Eyes: Negative for discharge and visual disturbance.   Respiratory: Negative for choking and shortness of breath.    Cardiovascular: Negative for chest pain and palpitations.   Gastrointestinal: Negative for abdominal pain and blood in stool.   Endocrine: Negative.    Genitourinary: Negative for genital sores and hematuria.   Musculoskeletal: Negative for gait problem and joint swelling.   Skin: Negative for color change, pallor, rash and wound.        Nevus patient also had nail fungus   Allergic/Immunologic: Positive for environmental allergies. Negative for immunocompromised state.   Neurological: Negative for facial asymmetry and speech difficulty.   Psychiatric/Behavioral: Negative for hallucinations and suicidal ideas.       Objective   Physical Exam   Constitutional: He is oriented to person, place, and time. He appears well-developed and well-nourished.   HENT:   Head: Normocephalic.   Eyes: Conjunctivae are normal. Pupils are equal, round, and reactive to light.   Neck: Normal range of motion. Neck  supple.   Cardiovascular: Normal rate, regular rhythm and normal heart sounds.   Pulmonary/Chest: Effort normal and breath sounds normal.   Abdominal: Soft. Bowel sounds are normal.   Musculoskeletal: Normal range of motion.   Neurological: He is alert and oriented to person, place, and time.   Skin: Skin is warm and dry.   Nevus with nail fungus   Psychiatric: He has a normal mood and affect. His behavior is normal. Judgment and thought content normal.   Nursing note and vitals reviewed.      Assessment/Plan   Problems Addressed this Visit     None      Visit Diagnoses     Nevus    -  Primary

## 2019-01-14 ENCOUNTER — TELEPHONE (OUTPATIENT)
Dept: FAMILY MEDICINE CLINIC | Facility: CLINIC | Age: 79
End: 2019-01-14

## 2019-01-14 RX ORDER — METHYLPHENIDATE HYDROCHLORIDE 10 MG/1
10 TABLET ORAL DAILY
Qty: 90 TABLET | Refills: 0 | Status: SHIPPED | OUTPATIENT
Start: 2019-01-14 | End: 2019-04-16 | Stop reason: SDUPTHER

## 2019-01-14 NOTE — TELEPHONE ENCOUNTER
Obviously is contract can have 2 tabs only if he wants that let him have a choice on that does not suitable Dr. Aviles will be back tomorrow

## 2019-01-14 NOTE — TELEPHONE ENCOUNTER
NEEDS methylphenidate (RITALIN) 10 MG tablet SENT TO [facility] AT [address] HE IS OUT, AND NEEDS TODAY

## 2019-01-18 ENCOUNTER — TELEPHONE (OUTPATIENT)
Dept: FAMILY MEDICINE CLINIC | Facility: CLINIC | Age: 79
End: 2019-01-18

## 2019-01-18 RX ORDER — HYDROCODONE BITARTRATE AND ACETAMINOPHEN 10; 325 MG/1; MG/1
TABLET ORAL
Qty: 90 TABLET | Refills: 0 | Status: SHIPPED | OUTPATIENT
Start: 2019-01-18 | End: 2019-04-17 | Stop reason: SDUPTHER

## 2019-01-18 NOTE — TELEPHONE ENCOUNTER
Wants to know if we are gonna fill his pain medications since he put in for a refill on Monday via the pharmacy

## 2019-02-07 ENCOUNTER — TELEPHONE (OUTPATIENT)
Dept: GASTROENTEROLOGY | Facility: CLINIC | Age: 79
End: 2019-02-07

## 2019-02-07 ENCOUNTER — OFFICE VISIT (OUTPATIENT)
Dept: GASTROENTEROLOGY | Facility: CLINIC | Age: 79
End: 2019-02-07

## 2019-02-07 VITALS
SYSTOLIC BLOOD PRESSURE: 98 MMHG | WEIGHT: 173.4 LBS | HEIGHT: 71 IN | TEMPERATURE: 97.9 F | DIASTOLIC BLOOD PRESSURE: 66 MMHG | BODY MASS INDEX: 24.27 KG/M2

## 2019-02-07 DIAGNOSIS — K62.5 RECTAL BLEEDING: Primary | ICD-10-CM

## 2019-02-07 DIAGNOSIS — K57.50 DIVERTICUL DISEASE SMALL AND LARGE INTESTINE, NO PERFORATI OR ABSCESS: ICD-10-CM

## 2019-02-07 DIAGNOSIS — K21.9 GASTROESOPHAGEAL REFLUX DISEASE, ESOPHAGITIS PRESENCE NOT SPECIFIED: ICD-10-CM

## 2019-02-07 PROCEDURE — 99214 OFFICE O/P EST MOD 30 MIN: CPT | Performed by: NURSE PRACTITIONER

## 2019-02-07 NOTE — TELEPHONE ENCOUNTER
----- Message from PABLITO Lauren sent at 2/7/2019  3:07 PM EST -----  Here is the patients pulmonologist # [phone]

## 2019-02-07 NOTE — TELEPHONE ENCOUNTER
----- Message from PABLITO Lauren sent at 2/7/2019  3:05 PM EST -----  This patient needs a colonoscopy with Dr. Mattson for rectal bleeding. We need approval from the patients pulmonologist first. Per the patient, this is Dr. CASSIDY. I will go ahead and place the order for the procedure. Thanks.

## 2019-02-07 NOTE — PROGRESS NOTES
Chief Complaint   Patient presents with   • Follow-up   • Rectal Bleeding       Tony Leonard is a  78 y.o. male here for a follow up visit for rectal bleeding.     HPI    This is an established patient new to me seen today for complaints of rectal bleeding.  He has a long-standing history of rectal bleeding but feels like it's more frequent lately.  The blood is bright red and on the toilet paper.  Bleeding comes and goes.  Last week bleeding occurred every day.  No bleeding this week.  His bowels are moving daily.  He denies diarrhea or constipation.  He denies rectal pain.  I reviewed colonoscopy report from May 17, 2013; internal hemorrhoids that bleed (grade 1), external hemorrhoids.  Tortuous colon.  Diverticulosis in the sigmoid and the descending colon.  Normal ileum.    He has a long-standing history of GERD.  He is currently on pantoprazole 40 mg taken once daily 30 minutes prior to breakfast.  He denies acid reflux, heartburn, nausea, vomiting, or dysphagia.  His appetite is good.  His weight is stable.    He was recently diagnosed with prostate cancer.  He plans to have his first radiation treatment next week.    He has a history of COPD and uses oxygen at night.  He did undergo bronchoscopy on 1/30/18 for hemoptysis.  Patient he follows with  for pulmonary issues.     Past Medical History:   Diagnosis Date   • ADHD (attention deficit hyperactivity disorder)    • Bronchiectasis (CMS/HCC)    • Colon polyp    • COPD (chronic obstructive pulmonary disease) (CMS/HCC)    • Diverticulosis    • On home oxygen therapy    • Pneumonia        Past Surgical History:   Procedure Laterality Date   • BRONCHOSCOPY N/A 4/30/2016    Procedure: BRONCHOSCOPY with BAL of right lower lobe and left  lower lobe.  ;  Surgeon: Nando Diaz MD;  Location: [facility] ENDOSCOPY;  Service:    • BRONCHOSCOPY N/A 4/28/2017    Procedure: BRONCHOSCOPY;  Surgeon: Katharina Salter MD;  Location: [facility] ENDOSCOPY;  Service:    •  BRONCHOSCOPY Bilateral 6/29/2017    Procedure: BRONCHOSCOPY WITH WASHINGS;  Surgeon: Katharina Salter MD;  Location: [facility] ENDOSCOPY;  Service:    • BRONCHOSCOPY N/A 1/22/2018    Procedure: BRONCHOSCOPY AT BEDSIDE with BAL;  Surgeon: Katharina Salter MD;  [facility] END[facility]COPY;  Service:    • BRONCHOSCOPY N/A 1/30/2018    Procedure: BRONCHOSCOPY with BAL and washing;  Surgeon: Shreyas Wild MD;  Location: [facility] ENDOSCOPY;  Service:    • BRONCHOSCOPY Bilateral 4/24/2018    Procedure: BRONCHOSCOPY WITH WASHING;  Surgeon: Katharina Salter MD;  Location: [facility] ENDOSCOPY;  Service: Pulmonary   • COLONOSCOPY  05/17/2013    eh, ih, tort, sig tics   • ENDOSCOPY  03/19/2015    z line irreg, hh   • HERNIA REPAIR     • HIP OPEN REDUCTION Right 1/17/2018    Procedure: HIP OPEN REDUCTION INTERNAL FIXATION WITH DYNAMIC HIP SCREW;  Surgeon: Les Black MD;  Location: [facility] MAIN OR;  Service:    • SPINE SURGERY     • TONSILLECTOMY         Scheduled Meds:  Outpatient Encounter Medications as of 2/7/2019   Medication Sig Dispense Refill   • albuterol (PROVENTIL) (2.5 MG/3ML) 0.083% nebulizer solution Take 2.5 mg by nebulization 4 (Four) Times a Day. 360 mL 3   • aspirin 81 MG EC tablet Take 81 mg by mouth Daily.     • Azithromycin (ZITHROMAX PO) Take  by mouth 3 (Three) Times a Week.     • budesonide (PULMICORT) 0.5 MG/2ML nebulizer solution 1 vial only twice daily 120 each 3   • FLUoxetine (PROZAC) 20 MG capsule 3 tablets once daily (Patient taking differently: Take 20 mg by mouth Daily. 3 tablets once daily) 90 capsule 4   • HYDROcodone-acetaminophen (NORCO)  MG per tablet 1 by mouth 3 times a day when necessary chronic severe low back pain 90 tablet 0   • ibuprofen (ADVIL,MOTRIN) 200 MG tablet Take 200 mg by mouth Every 6 (Six) Hours As Needed for Mild Pain .     • methylphenidate (RITALIN) 10 MG tablet Take 1 tablet by mouth Daily. For ADD 3 months 90 tablet 0   • Multiple Vitamins-Minerals (MULTIVITAMIN ADULT PO) Take  1 tablet by mouth Daily.     • rOPINIRole (REQUIP) 0.5 MG tablet Take 1 tablet by mouth Every Night. Take 0.5mg 1 hour prior to bedtime, may repeat if needed Max of 1mg total dose 90 tablet 3   • Sennosides-Docusate Sodium (SENNA PLUS PO) Take  by mouth.     • sodium chloride 7 % nebulizer solution nebulizer solution      • TiZANidine (ZANAFLEX) 6 MG capsule 1 po tid prn muscle spasm do not drive 90 capsule 3   • triamcinolone (KENALOG) 0.1 % cream Apply  topically 2 (Two) Times a Day As Needed for Rash. Do not use more than one week then needs to BE off for a week 30 g 2   • umeclidinium-vilanterol (ANORO ELLIPTA) 62.5-25 MCG/INH aerosol powder  inhaler Inhale 1 puff Daily.     • Unable to find Take 1 each by mouth 1 (One) Time. Tumeric QD     • Unable to find Take 1 each by mouth 1 (One) Time. CBD oil daily     • apixaban (ELIQUIS) 2.5 MG tablet tablet Take 2.5 mg by mouth 2 (Two) Times a Day.     • cetirizine (zyrTEC) 10 MG tablet Take 1 tablet by mouth Daily As Needed (itching). 90 tablet 3   • furosemide (LASIX) 20 MG tablet Take 1 tablet by mouth Daily. Take with potassium 30 tablet 1   • magnesium hydroxide (MILK OF MAGNESIA) 2400 MG/10ML suspension suspension Take 10 mL by mouth Daily As Needed (constipation). 10 mL    • mupirocin (BACTROBAN) 2 % ointment Apply  topically to the appropriate area as directed 2 (Two) Times a Day. 22 g 1   • pantoprazole (PROTONIX) 40 MG EC tablet Take 1 tablet by mouth Daily. 90 tablet 3   • tamsulosin (FLOMAX) 0.4 MG capsule 24 hr capsule Take 2 capsules by mouth Daily. 60 capsule 0   • vitamin B-12 (CYANOCOBALAMIN) 2500 MCG sublingual tablet tablet Take 2,500 mcg by mouth Daily.     • [DISCONTINUED] cholecalciferol (VITAMIN D3) 1000 units tablet Take 1,000 Units by mouth Daily.     • [DISCONTINUED] clotrimazole-betamethasone (LOTRISONE) 1-0.05 % cream      • [DISCONTINUED] predniSONE (DELTASONE) 20 MG tablet Bid for 3 days, then daily for 4 days, then stop 10 tablet 0     No  facility-administered encounter medications on file as of 2/7/2019.        Continuous Infusions:  No current facility-administered medications for this visit.     PRN Meds:.    Allergies   Allergen Reactions   • Daliresp [Roflumilast] Anaphylaxis   • Latex Rash   • Sulfa Antibiotics Rash       Social History     Socioeconomic History   • Marital status: Single     Spouse name: Not on file   • Number of children: Not on file   • Years of education: Not on file   • Highest education level: Not on file   Social Needs   • Financial resource strain: Not on file   • Food insecurity - worry: Not on file   • Food insecurity - inability: Not on file   • Transportation needs - medical: Not on file   • Transportation needs - non-medical: Not on file   Occupational History   • Not on file   Tobacco Use   • Smoking status: Never Smoker   • Smokeless tobacco: Never Used   Substance and Sexual Activity   • Alcohol use: No     Comment: red wine occassional   • Drug use: No   • Sexual activity: Defer   Other Topics Concern   • Not on file   Social History Narrative   • Not on file       Family History   Problem Relation Age of Onset   • Thyroid disease Mother    • No Known Problems Father        Review of Systems   Constitutional: Negative for activity change, appetite change, fatigue, fever and unexpected weight change.   Respiratory: Negative for apnea, cough, choking, chest tightness, shortness of breath and wheezing.    Cardiovascular: Negative for chest pain, palpitations and leg swelling.   Gastrointestinal: Positive for hematochezia. Negative for abdominal distention, abdominal pain, anal bleeding, blood in stool, constipation, diarrhea, nausea, rectal pain and vomiting.       Vitals:    02/07/19 1414   BP: 98/66   Temp: 97.9 °F (36.6 °C)       Physical Exam   Constitutional: He is oriented to person, place, and time. He appears well-developed and well-nourished.   Eyes: Pupils are equal, round, and reactive to light.    Cardiovascular: Normal rate, regular rhythm and normal heart sounds.   Pulmonary/Chest: Effort normal and breath sounds normal. No respiratory distress. He has no wheezes. He has no rales. He exhibits no tenderness.   Abdominal: Soft. Bowel sounds are normal. He exhibits no distension and no mass. There is no tenderness. There is no guarding. No hernia.   Genitourinary: Rectum normal.   Musculoskeletal: Normal range of motion.   Neurological: He is alert and oriented to person, place, and time.   Skin: Skin is warm and dry. Capillary refill takes less than 2 seconds.   Psychiatric: He has a normal mood and affect. His behavior is normal.       No images are attached to the encounter.    Tony was seen today for follow-up and rectal bleeding.    Diagnoses and all orders for this visit:    Rectal bleeding    Diverticul disease small and large intestine, no perforati or abscess    Gastroesophageal reflux disease, esophagitis presence not specified     Assessment    #1 rectal bleeding, I cleaned hemorrhoidal in nature but underlying inflammation, polyp, or mass cannot be excluded.  Normal rectal exam on visit today.  #2 GERD, currently well controlled on PPI therapy.  #3 diverticulosis, as mentioned on prior colonoscopy    Plan    Schedule patient for colonoscopy for further evaluation of rectal bleeding.  We will need approval from the patient's pulmonologist.  I discussed situation with Dr. Mattson and he is agreeable.  Continue PPI.  High-fiber diet.   Follow up and further recommendations will be based on endoscopic findings.

## 2019-02-07 NOTE — TELEPHONE ENCOUNTER
Call to DR Salter's office @ [phone] and spoke with [employer].  Request pulmonary clearance for c/s to be scheduled.  Awaiting reply.

## 2019-02-13 NOTE — TELEPHONE ENCOUNTER
Call to Dr Salter's office and spoke with Pantera.  Checking status of pulmonary clearance.  Pantera states fill fax clearance auth [id number].

## 2019-02-27 ENCOUNTER — OFFICE VISIT (OUTPATIENT)
Dept: ORTHOPEDIC SURGERY | Facility: CLINIC | Age: 79
End: 2019-02-27

## 2019-02-27 VITALS — WEIGHT: 173 LBS | BODY MASS INDEX: 24.22 KG/M2 | TEMPERATURE: 99.3 F | HEIGHT: 71 IN

## 2019-02-27 DIAGNOSIS — Z09 SURGERY FOLLOW-UP: Primary | ICD-10-CM

## 2019-02-27 DIAGNOSIS — M16.11 ARTHRITIS OF RIGHT HIP: ICD-10-CM

## 2019-02-27 DIAGNOSIS — Z87.81 HISTORY OF FRACTURE OF RIGHT HIP: ICD-10-CM

## 2019-02-27 PROCEDURE — 99213 OFFICE O/P EST LOW 20 MIN: CPT | Performed by: ORTHOPAEDIC SURGERY

## 2019-02-27 PROCEDURE — 73552 X-RAY EXAM OF FEMUR 2/>: CPT | Performed by: ORTHOPAEDIC SURGERY

## 2019-02-27 RX ORDER — ROPINIROLE 0.5 MG/1
TABLET, FILM COATED ORAL
Qty: 90 TABLET | Refills: 3 | Status: SHIPPED | OUTPATIENT
Start: 2019-02-27 | End: 2019-09-20

## 2019-02-27 NOTE — PROGRESS NOTES
"Patient Name: Tony Leonard   YOB: 1940  Referring Primary Care Physician: Erich Aviles MD  BMI: Body mass index is 24.13 kg/m².    Chief Complaint:    Chief Complaint   Patient presents with   • Right Knee - Follow-up, Pain        HPI:     Tony Leonard is a 78 y.o. male who presents today for evaluation of   Chief Complaint   Patient presents with   • Right Knee - Follow-up, Pain   .  The patient is seen today asking for \"advice\".  He sustained a right intertrochanteric hip fracture about a year ago in January.  We done a IM nail and he had done well.  He has long-standing severe arthritis of his right hip.  And this is bothering him in.  He C he sees Dr. Dejan Villanueva and pain management.  He said the purpose of the visit today is to get the hip examined and see if it would be safe to inject the hip intra-articularly in the pain clinic.  He already does some things with his back.  He is on blood thinner and has somewhat of a medical history that is reviewed although he denied to me that he had known heart failure it is on his diagnoses as well as pretty significant pulmonary problems.   This problem is new to this examiner.     Subjective   Medications:   Home Medications:  Current Outpatient Medications on File Prior to Visit   Medication Sig   • albuterol (PROVENTIL) (2.5 MG/3ML) 0.083% nebulizer solution Take 2.5 mg by nebulization 4 (Four) Times a Day.   • apixaban (ELIQUIS) 2.5 MG tablet tablet Take 2.5 mg by mouth 2 (Two) Times a Day.   • aspirin 81 MG EC tablet Take 81 mg by mouth Daily.   • Azithromycin (ZITHROMAX PO) Take  by mouth 3 (Three) Times a Week.   • budesonide (PULMICORT) 0.5 MG/2ML nebulizer solution 1 vial only twice daily   • cetirizine (zyrTEC) 10 MG tablet Take 1 tablet by mouth Daily As Needed (itching).   • FLUoxetine (PROZAC) 20 MG capsule 3 tablets once daily (Patient taking differently: Take 20 mg by mouth Daily. 3 tablets once daily)   • furosemide (LASIX) 20 " MG tablet Take 1 tablet by mouth Daily. Take with potassium   • HYDROcodone-acetaminophen (NORCO)  MG per tablet 1 by mouth 3 times a day when necessary chronic severe low back pain   • ibuprofen (ADVIL,MOTRIN) 200 MG tablet Take 200 mg by mouth Every 6 (Six) Hours As Needed for Mild Pain .   • magnesium hydroxide (MILK OF MAGNESIA) 2400 MG/10ML suspension suspension Take 10 mL by mouth Daily As Needed (constipation).   • methylphenidate (RITALIN) 10 MG tablet Take 1 tablet by mouth Daily. For ADD 3 months   • Multiple Vitamins-Minerals (MULTIVITAMIN ADULT PO) Take 1 tablet by mouth Daily.   • mupirocin (BACTROBAN) 2 % ointment Apply  topically to the appropriate area as directed 2 (Two) Times a Day.   • pantoprazole (PROTONIX) 40 MG EC tablet Take 1 tablet by mouth Daily.   • rOPINIRole (REQUIP) 0.5 MG tablet TAKE 1 TABLET EVERY NIGHT ONE HOUR PRIOR TO BEDTIME, MAY REPEAT IF NEEDED MAX OF 1 MG TOTAL DOSE.   • Sennosides-Docusate Sodium (SENNA PLUS PO) Take  by mouth.   • sodium chloride 7 % nebulizer solution nebulizer solution    • tamsulosin (FLOMAX) 0.4 MG capsule 24 hr capsule Take 2 capsules by mouth Daily.   • TiZANidine (ZANAFLEX) 6 MG capsule 1 po tid prn muscle spasm do not drive   • triamcinolone (KENALOG) 0.1 % cream Apply  topically 2 (Two) Times a Day As Needed for Rash. Do not use more than one week then needs to BE off for a week   • umeclidinium-vilanterol (ANORO ELLIPTA) 62.5-25 MCG/INH aerosol powder  inhaler Inhale 1 puff Daily.   • Unable to find Take 1 each by mouth 1 (One) Time. Tumeric QD   • Unable to find Take 1 each by mouth 1 (One) Time. CBD oil daily   • vitamin B-12 (CYANOCOBALAMIN) 2500 MCG sublingual tablet tablet Take 2,500 mcg by mouth Daily.   • [DISCONTINUED] rOPINIRole (REQUIP) 0.5 MG tablet Take 1 tablet by mouth Every Night. Take 0.5mg 1 hour prior to bedtime, may repeat if needed Max of 1mg total dose     No current facility-administered medications on file prior to  visit.      Current Medications:  Scheduled Meds:  Continuous Infusions:  No current facility-administered medications for this visit.   PRN Meds:.    I have reviewed the patient's medical history in detail and updated the computerized patient record.  Review and summarization of old records includes:    Past Medical History:   Diagnosis Date   • ADHD (attention deficit hyperactivity disorder)    • Bronchiectasis (CMS/[facility])    • Colon polyp    • COPD (chronic obstructive pulmonary disease) (CMS/[facility])    • Diverticulosis    • On home oxygen therapy    • Pneumonia         Past Surgical History:   Procedure Laterality Date   • BRONCHOSCOPY N/A 4/30/2016    Procedure: BRONCHOSCOPY with BAL of right lower lobe and left  lower lobe.  ;  Surgeon: Nando Diaz MD;  Location: [facility] ENDOSCOPY;  Service:    • BRONCHOSCOPY N/A 4/28/2017    Procedure: BRONCHOSCOPY;  Surgeon: Katharina Salter MD;  Location: [facility] ENDOSCOPY;  Service:    • BRONCHOSCOPY Bilateral 6/29/2017    Procedure: BRONCHOSCOPY WITH WASHINGS;  Surgeon: Katharina Salter MD;  Location: [facility] ENDOSCOPY;  Service:    • BRONCHOSCOPY N/A 1/22/2018    Procedure: BRONCHOSCOPY AT BEDSIDE with BAL;  Surgeon: Katharina Salter MD;  Location: [facility] ENDOSCOPY;  Service:    • BRONCHOSCOPY N/A 1/30/2018    Procedure: BRONCHOSCOPY with BAL and washing;  Surgeon: Shreyas Wild MD;  Location: [facility] ENDOSCOPY;  Service:    • BRONCHOSCOPY Bilateral 4/24/2018    Procedure: BRONCHOSCOPY WITH WASHING;  Surgeon: Katharina Salter MD;  Location: [facility] ENDOSCOPY;  Service: Pulmonary   • COLONOSCOPY  05/17/2013    eh, ih, tort, sig tics   • ENDOSCOPY  03/19/2015    z line irreg, hh   • HERNIA REPAIR     • HIP OPEN REDUCTION Right 1/17/2018    Procedure: HIP OPEN REDUCTION INTERNAL FIXATION WITH DYNAMIC HIP SCREW;  Surgeon: Les Black MD;  Location: [facility] OR;  Service:    • SPINE SURGERY     • TONSILLECTOMY          Social History     Occupational History   • Not on file  "  Tobacco Use   • Smoking status: Never Smoker   • Smokeless tobacco: Never Used   Substance and Sexual Activity   • Alcohol use: No     Comment: red wine occassional   • Drug use: No   • Sexual activity: Defer      Social History     Social History Narrative   • Not on file        Family History   Problem Relation Age of Onset   • Thyroid disease Mother    • No Known Problems Father        ROS: 14 point review of systems was performed and all other systems were reviewed and are negative except for documented findings in HPI and today's encounter.     Allergies:   Allergies   Allergen Reactions   • Daliresp [Roflumilast] Anaphylaxis   • Latex Rash   • Sulfa Antibiotics Rash     Constitutional:  Denies fever, shaking or chills   Eyes:  Denies change in visual acuity   HENT:  Denies nasal congestion or sore throat   Respiratory:  Denies cough or shortness of breath   Cardiovascular:  Denies chest pain or severe LE edema   GI:  Denies abdominal pain, nausea, vomiting, bloody stools or diarrhea   Musculoskeletal:  Numbness, tingling, pain, or loss of motor function only as noted above in history of present illness.  : Denies painful urination or hematuria  Integument:  Denies rash, lesion or ulceration   Neurologic:  Denies headache or focal weakness  Endocrine:  Denies lymphadenopathy  Psych:  Denies confusion or change in mental status   Hem:  Denies active bleeding    OBJECTIVE:  Physical Exam:   Temp 99.3 °F (37.4 °C) (Temporal)   Ht 180.3 cm (71\")   Wt 78.5 kg (173 lb)   BMI 24.13 kg/m²     General Appearance:    Alert, cooperative, in no acute distress                  Eyes: conjunctiva clear  ENT: external ears and nose atraumatic  CV: no peripheral edema  Resp: normal respiratory effort  Skin: no rashes or wounds; normal turgor  Psych: mood and affect appropriate  Lymph: no nodes appreciated  Neuro: gross sensation intact  Vascular:  Palpable peripheral pulse in noted extremity  Musculoskeletal Extremities: " I am today shows that he has pelvic asymmetry and deformity in his lumbar spine some trouble transferring and walking but he does well with his cane his hip has functional flexion extension not much internal/external rotation.  Not tender to axial loading    Radiology:   AP lateral x-rays of his right hip and femur taken in the office today compared to old x-rays show healing of his hip fracture with a long intramedullary nail.  He has severe end-stage osteoarthritic changes within the hip joint.  He has some on the other side but they do not bother him.    Assessment:     ICD-10-CM ICD-9-CM   1. Surgery follow-up Z09 V67.00   2. Arthritis of right hip M16.11 716.95   3. History of fracture of right hip Z87.81 V15.51        Procedures       Plan: Biomechanics of pertinent body area discussed.  Risks, benefits, alternatives, comparisons, and complications of accepted medicines, injections, recommendations, surgical procedures, and therapies explained and education provided in laymen's terms. Natural history and expected course of this patient's diagnosis discussed along with evaluation of therapies. Questions answered. When appropriate I also discussed proper use of cane, walker, trekking poles.    Went over the patient's options including total hip arthroplasty with removal of the nail.  With his medical history and with his and with his medical history does not desire to pursue that he is seeing Dr. Dejan Villanueva and pain management at think is reasonable to try intra-articular R injections into the right hip joint itself to see if this can alleviate some pain he is using a cane I suggested a walker he said that he had that as well as a motorized scooter and would prefer to use those I will send a copy this to Dr. Dejan Villanueva but I think be reasonable to inject his hip along with whatever pain modalities he needs to do for his back with see this nice person back if he needed to be seen      2/27/2019  Les Black,  MD

## 2019-03-07 ENCOUNTER — OFFICE VISIT (OUTPATIENT)
Dept: GASTROENTEROLOGY | Facility: CLINIC | Age: 79
End: 2019-03-07

## 2019-03-07 VITALS
SYSTOLIC BLOOD PRESSURE: 92 MMHG | HEIGHT: 71 IN | BODY MASS INDEX: 24.05 KG/M2 | DIASTOLIC BLOOD PRESSURE: 58 MMHG | TEMPERATURE: 97.7 F | WEIGHT: 171.8 LBS

## 2019-03-07 DIAGNOSIS — K62.5 RECTAL BLEEDING: ICD-10-CM

## 2019-03-07 DIAGNOSIS — K21.9 GASTROESOPHAGEAL REFLUX DISEASE, ESOPHAGITIS PRESENCE NOT SPECIFIED: Primary | ICD-10-CM

## 2019-03-07 PROCEDURE — 99214 OFFICE O/P EST MOD 30 MIN: CPT | Performed by: INTERNAL MEDICINE

## 2019-03-07 NOTE — PROGRESS NOTES
Chief Complaint   Patient presents with   • Follow-up        Tony Leonard is a  78 y.o. male here for a follow up visit for GERD, history of rectal bleeding    HPI this 78-year-old white male patient of Dr. Erich Aviles returns in follow-up since he was last seen in February.  He continues to complain of some rectal bleeding but wants to defer endoscopic evaluation until after he is completed his radiation therapy for prostate cancer.  We talked about use of fiber and stool softeners as well as local therapy for hemorrhoidal problems.  He will apply hydrocortisone-based cream per rectum for external hemorrhoids.    Past Medical History:   Diagnosis Date   • ADHD (attention deficit hyperactivity disorder)    • Bronchiectasis (CMS/HCC)    • Colon polyp    • COPD (chronic obstructive pulmonary disease) (CMS/HCC)    • Diverticulosis    • On home oxygen therapy    • Pneumonia        Current Outpatient Medications   Medication Sig Dispense Refill   • albuterol (PROVENTIL) (2.5 MG/3ML) 0.083% nebulizer solution Take 2.5 mg by nebulization 4 (Four) Times a Day. 360 mL 3   • budesonide (PULMICORT) 0.5 MG/2ML nebulizer solution 1 vial only twice daily 120 each 3   • FLUoxetine (PROZAC) 20 MG capsule 3 tablets once daily (Patient taking differently: Take 20 mg by mouth Daily. 3 tablets once daily) 90 capsule 4   • HYDROcodone-acetaminophen (NORCO)  MG per tablet 1 by mouth 3 times a day when necessary chronic severe low back pain 90 tablet 0   • ibuprofen (ADVIL,MOTRIN) 200 MG tablet Take 200 mg by mouth Every 6 (Six) Hours As Needed for Mild Pain .     • methylphenidate (RITALIN) 10 MG tablet Take 1 tablet by mouth Daily. For ADD 3 months 90 tablet 0   • Multiple Vitamins-Minerals (MULTIVITAMIN ADULT PO) Take 1 tablet by mouth Daily.     • mupirocin (BACTROBAN) 2 % ointment Apply  topically to the appropriate area as directed 2 (Two) Times a Day. 22 g 1   • pantoprazole (PROTONIX) 40 MG EC tablet Take 1 tablet by  mouth Daily. 90 tablet 3   • rOPINIRole (REQUIP) 0.5 MG tablet TAKE 1 TABLET EVERY NIGHT ONE HOUR PRIOR TO BEDTIME, MAY REPEAT IF NEEDED MAX OF 1 MG TOTAL DOSE. 90 tablet 3   • sodium chloride 7 % nebulizer solution nebulizer solution      • tamsulosin (FLOMAX) 0.4 MG capsule 24 hr capsule Take 2 capsules by mouth Daily. 60 capsule 0   • TiZANidine (ZANAFLEX) 6 MG capsule 1 po tid prn muscle spasm do not drive 90 capsule 3   • triamcinolone (KENALOG) 0.1 % cream Apply  topically 2 (Two) Times a Day As Needed for Rash. Do not use more than one week then needs to BE off for a week 30 g 2   • umeclidinium-vilanterol (ANORO ELLIPTA) 62.5-25 MCG/INH aerosol powder  inhaler Inhale 1 puff Daily.     • vitamin B-12 (CYANOCOBALAMIN) 2500 MCG sublingual tablet tablet Take 2,500 mcg by mouth Daily.     • apixaban (ELIQUIS) 2.5 MG tablet tablet Take 2.5 mg by mouth 2 (Two) Times a Day.     • aspirin 81 MG EC tablet Take 81 mg by mouth Daily.     • Azithromycin (ZITHROMAX PO) Take  by mouth 3 (Three) Times a Week.     • cetirizine (zyrTEC) 10 MG tablet Take 1 tablet by mouth Daily As Needed (itching). 90 tablet 3   • furosemide (LASIX) 20 MG tablet Take 1 tablet by mouth Daily. Take with potassium 30 tablet 1   • magnesium hydroxide (MILK OF MAGNESIA) 2400 MG/10ML suspension suspension Take 10 mL by mouth Daily As Needed (constipation). 10 mL    • Sennosides-Docusate Sodium (SENNA PLUS PO) Take  by mouth.     • Unable to find Take 1 each by mouth 1 (One) Time. Tumeric QD     • Unable to find Take 1 each by mouth 1 (One) Time. CBD oil daily       No current facility-administered medications for this visit.        PRN Meds:.    Allergies   Allergen Reactions   • Daliresp [Roflumilast] Anaphylaxis   • Latex Rash   • Sulfa Antibiotics Rash       Social History     Socioeconomic History   • Marital status: Single     Spouse name: Not on file   • Number of children: Not on file   • Years of education: Not on file   • Highest education  level: Not on file   Social Needs   • Financial resource strain: Not on file   • Food insecurity - worry: Not on file   • Food insecurity - inability: Not on file   • Transportation needs - medical: Not on file   • Transportation needs - non-medical: Not on file   Occupational History   • Not on file   Tobacco Use   • Smoking status: Never Smoker   • Smokeless tobacco: Never Used   Substance and Sexual Activity   • Alcohol use: No     Comment: red wine occassional   • Drug use: No   • Sexual activity: Defer   Other Topics Concern   • Not on file   Social History Narrative   • Not on file       Family History   Problem Relation Age of Onset   • Thyroid disease Mother    • No Known Problems Father        Review of Systems   Constitutional: Negative for activity change, appetite change, fatigue and unexpected weight change.   HENT: Negative for congestion, facial swelling, sore throat, trouble swallowing and voice change.    Eyes: Negative for photophobia and visual disturbance.   Respiratory: Negative for cough and choking.    Cardiovascular: Negative for chest pain.   Gastrointestinal: Positive for anal bleeding. Negative for abdominal distention, abdominal pain, blood in stool, constipation, diarrhea, nausea, rectal pain and vomiting.   Endocrine: Negative for polyphagia.   Musculoskeletal: Positive for back pain and gait problem. Negative for arthralgias and joint swelling.   Skin: Negative for color change, pallor and rash.   Allergic/Immunologic: Negative for food allergies.   Neurological: Negative for speech difficulty and headaches.   Hematological: Does not bruise/bleed easily.   Psychiatric/Behavioral: Negative for agitation, confusion and sleep disturbance.       Vitals:    03/07/19 1302   BP: 92/58   Temp: 97.7 °F (36.5 °C)       Physical Exam   Constitutional: He is oriented to person, place, and time. He appears well-developed and well-nourished.   HENT:   Head: Normocephalic.   Mouth/Throat: Oropharynx  is clear and moist.   Eyes: Conjunctivae and EOM are normal.   Neck: Normal range of motion.   Cardiovascular: Normal rate and regular rhythm.   Pulmonary/Chest: Breath sounds normal.   Abdominal: Soft. Bowel sounds are normal.   Musculoskeletal: Normal range of motion.   Uses cane for ambulation   Neurological: He is alert and oriented to person, place, and time.   Skin: Skin is warm and dry.   Psychiatric: He has a normal mood and affect. His behavior is normal.       ASSESSMENT   #1 rectal bleeding: Likely associated with hemorrhoids, cannot rule out of the etiology.  #2 radiation treatment for prostate cancer  #3 GERD      PLAN  Schedule colonoscopy once radiation complete  Analpram with hydrocortisone 2.5% or equivalent as needed for rectal bleeding      ICD-10-CM ICD-9-CM   1. Gastroesophageal reflux disease, esophagitis presence not specified K21.9 530.81   2. Rectal bleeding K62.5 569.3

## 2019-04-16 ENCOUNTER — TELEPHONE (OUTPATIENT)
Dept: FAMILY MEDICINE CLINIC | Facility: CLINIC | Age: 79
End: 2019-04-16

## 2019-04-16 RX ORDER — PANTOPRAZOLE SODIUM 40 MG/1
40 TABLET, DELAYED RELEASE ORAL DAILY
Qty: 90 TABLET | Refills: 3 | Status: SHIPPED | OUTPATIENT
Start: 2019-04-16 | End: 2020-01-22

## 2019-04-16 RX ORDER — METHYLPHENIDATE HYDROCHLORIDE 10 MG/1
10 TABLET ORAL DAILY
Qty: 90 TABLET | Refills: 0 | Status: SHIPPED | OUTPATIENT
Start: 2019-04-16 | End: 2019-08-01 | Stop reason: SDUPTHER

## 2019-04-17 ENCOUNTER — TELEPHONE (OUTPATIENT)
Dept: FAMILY MEDICINE CLINIC | Facility: CLINIC | Age: 79
End: 2019-04-17

## 2019-04-17 RX ORDER — HYDROCODONE BITARTRATE AND ACETAMINOPHEN 10; 325 MG/1; MG/1
TABLET ORAL
Qty: 90 TABLET | Refills: 0 | Status: SHIPPED | OUTPATIENT
Start: 2019-04-17 | End: 2019-08-01

## 2019-04-17 NOTE — TELEPHONE ENCOUNTER
REFILL HYDROcodone-acetaminophen (NORCO)  MG per tablet  PT SPECIFICALLY ASKED FOR THIS TO GO TO [facility]

## 2019-04-18 PROBLEM — R19.8 CHANGE IN BOWEL FUNCTION: Status: ACTIVE | Noted: 2019-04-18

## 2019-04-18 PROBLEM — K62.5 RECTAL BLEEDING: Status: ACTIVE | Noted: 2019-04-18

## 2019-04-22 PROBLEM — C61 PROSTATE CANCER: Status: ACTIVE | Noted: 2019-04-22

## 2019-05-10 ENCOUNTER — HOSPITAL ENCOUNTER (OUTPATIENT)
Facility: HOSPITAL | Age: 79
Setting detail: HOSPITAL OUTPATIENT SURGERY
Discharge: HOME | End: 2019-05-10
Attending: INTERNAL MEDICINE | Admitting: INTERNAL MEDICINE

## 2019-05-10 ENCOUNTER — ANESTHESIA EVENT (OUTPATIENT)
Dept: GASTROENTEROLOGY | Facility: HOSPITAL | Age: 79
End: 2019-05-10

## 2019-05-10 ENCOUNTER — ANESTHESIA (OUTPATIENT)
Dept: GASTROENTEROLOGY | Facility: HOSPITAL | Age: 79
End: 2019-05-10

## 2019-05-10 VITALS
WEIGHT: 161.8 LBS | DIASTOLIC BLOOD PRESSURE: 64 MMHG | HEIGHT: 71 IN | HEART RATE: 60 BPM | RESPIRATION RATE: 16 BRPM | OXYGEN SATURATION: 95 % | TEMPERATURE: 97.7 F | SYSTOLIC BLOOD PRESSURE: 120 MMHG | BODY MASS INDEX: 22.65 KG/M2

## 2019-05-10 DIAGNOSIS — R19.8 CHANGE IN BOWEL FUNCTION: ICD-10-CM

## 2019-05-10 DIAGNOSIS — K62.5 RECTAL BLEEDING: ICD-10-CM

## 2019-05-10 PROCEDURE — 25010000002 PROPOFOL 10 MG/ML EMULSION: Performed by: ANESTHESIOLOGY

## 2019-05-10 PROCEDURE — 45385 COLONOSCOPY W/LESION REMOVAL: CPT | Performed by: INTERNAL MEDICINE

## 2019-05-10 PROCEDURE — S0260 H&P FOR SURGERY: HCPCS | Performed by: INTERNAL MEDICINE

## 2019-05-10 PROCEDURE — 88305 TISSUE EXAM BY PATHOLOGIST: CPT | Performed by: INTERNAL MEDICINE

## 2019-05-10 RX ORDER — AZITHROMYCIN 250 MG/1
250 TABLET, FILM COATED ORAL 3 TIMES WEEKLY
COMMUNITY
End: 2020-01-22

## 2019-05-10 RX ORDER — PROMETHAZINE HYDROCHLORIDE 25 MG/1
25 SUPPOSITORY RECTAL ONCE AS NEEDED
Status: DISCONTINUED | OUTPATIENT
Start: 2019-05-10 | End: 2019-05-10 | Stop reason: HOSPADM

## 2019-05-10 RX ORDER — PROMETHAZINE HYDROCHLORIDE 25 MG/ML
12.5 INJECTION, SOLUTION INTRAMUSCULAR; INTRAVENOUS ONCE AS NEEDED
Status: DISCONTINUED | OUTPATIENT
Start: 2019-05-10 | End: 2019-05-10 | Stop reason: HOSPADM

## 2019-05-10 RX ORDER — LIDOCAINE HYDROCHLORIDE 20 MG/ML
INJECTION, SOLUTION INFILTRATION; PERINEURAL AS NEEDED
Status: DISCONTINUED | OUTPATIENT
Start: 2019-05-10 | End: 2019-05-10 | Stop reason: SURG

## 2019-05-10 RX ORDER — SODIUM CHLORIDE 9 MG/ML
30 INJECTION, SOLUTION INTRAVENOUS CONTINUOUS PRN
Status: DISCONTINUED | OUTPATIENT
Start: 2019-05-10 | End: 2019-05-10 | Stop reason: HOSPADM

## 2019-05-10 RX ORDER — SODIUM CHLORIDE, SODIUM LACTATE, POTASSIUM CHLORIDE, CALCIUM CHLORIDE 600; 310; 30; 20 MG/100ML; MG/100ML; MG/100ML; MG/100ML
INJECTION, SOLUTION INTRAVENOUS CONTINUOUS PRN
Status: DISCONTINUED | OUTPATIENT
Start: 2019-05-10 | End: 2019-05-10 | Stop reason: SURG

## 2019-05-10 RX ORDER — PROPOFOL 10 MG/ML
VIAL (ML) INTRAVENOUS CONTINUOUS PRN
Status: DISCONTINUED | OUTPATIENT
Start: 2019-05-10 | End: 2019-05-10 | Stop reason: SURG

## 2019-05-10 RX ORDER — VIT C/B6/B5/MAGNESIUM/HERB 173 50-5-6-5MG
1 CAPSULE ORAL DAILY
COMMUNITY
End: 2020-03-06 | Stop reason: HOSPADM

## 2019-05-10 RX ORDER — PROMETHAZINE HYDROCHLORIDE 25 MG/1
25 TABLET ORAL ONCE AS NEEDED
Status: DISCONTINUED | OUTPATIENT
Start: 2019-05-10 | End: 2019-05-10 | Stop reason: HOSPADM

## 2019-05-10 RX ORDER — PROPOFOL 10 MG/ML
VIAL (ML) INTRAVENOUS AS NEEDED
Status: DISCONTINUED | OUTPATIENT
Start: 2019-05-10 | End: 2019-05-10 | Stop reason: SURG

## 2019-05-10 RX ADMIN — SODIUM CHLORIDE 30 ML/HR: 9 INJECTION, SOLUTION INTRAVENOUS at 11:47

## 2019-05-10 RX ADMIN — PROPOFOL 50 MG: 10 INJECTION, EMULSION INTRAVENOUS at 11:56

## 2019-05-10 RX ADMIN — PROPOFOL 50 MG: 10 INJECTION, EMULSION INTRAVENOUS at 12:12

## 2019-05-10 RX ADMIN — PROPOFOL 200 MCG/KG/MIN: 10 INJECTION, EMULSION INTRAVENOUS at 11:55

## 2019-05-10 RX ADMIN — SODIUM CHLORIDE, POTASSIUM CHLORIDE, SODIUM LACTATE AND CALCIUM CHLORIDE: 600; 310; 30; 20 INJECTION, SOLUTION INTRAVENOUS at 11:52

## 2019-05-10 RX ADMIN — LIDOCAINE HYDROCHLORIDE 60 MG: 20 INJECTION, SOLUTION INFILTRATION; PERINEURAL at 11:55

## 2019-05-10 NOTE — ANESTHESIA POSTPROCEDURE EVALUATION
Patient: Tony Leonard    Procedure Summary     Date:  05/10/19 Room / Location:   [facility]    Anesthesia Start:  1152 Anesthesia Stop:  1225    Procedure:  COLONOSCOPY INTO CECUM & TERMINAL ILEUM WITH COLD SNARE POLYPECTOMY (N/A ) Diagnosis:       Diverticulosis      Polyp of colon      Tortuous colon      Hemorrhoids      (Change in bowel function [R19.8])      (Rectal bleeding [K62.5])    Surgeon:  Rowdy Mattson MD Provider:  Sherman Suggs MD    Anesthesia Type:  MAC ASA Status:  3          Anesthesia Type: MAC  Last vitals  BP   95/52 (05/10/19 1220)   Temp   36.5 °C (97.7 °F) (05/10/19 1106)   Pulse   66 (05/10/19 1220)   Resp   16 (05/10/19 1220)     SpO2   98 % (05/10/19 1220)     Post Anesthesia Care and Evaluation    Patient location during evaluation: PHASE II  Level of consciousness: awake and alert  Anesthetic complications: No anesthetic complications

## 2019-05-10 NOTE — DISCHARGE INSTRUCTIONS
For the next 24 hours patient needs to be with a responsible adult.    For 24 hours DO NOT drive, operate machinery, appliances, drink alcohol, make important decisions or sign legal documents.    Start with a light or bland diet if you are feeling sick to your stomach otherwise advance to regular diet as tolerated.    Follow recommendations on procedure report if provided by your doctor.    Call Dr. Mattson for problems [phone]    Problems may include but not limited to: large amounts of bleeding, trouble breathing, repeated vomiting, severe unrelieved pain, fever or chills.

## 2019-05-10 NOTE — ANESTHESIA PREPROCEDURE EVALUATION
Anesthesia Evaluation     NPO Solid Status: > 8 hours             Airway   Mallampati: I  TM distance: >3 FB  Neck ROM: full  Dental - normal exam     Pulmonary - normal exam   (+) pneumonia , COPD severe,   Cardiovascular - normal exam    (+) CHF,       Neuro/Psych  GI/Hepatic/Renal/Endo    (+)  GERD, GI bleeding,     Musculoskeletal     Abdominal    Substance History      OB/GYN          Other   (+) arthritis   history of cancer                    Anesthesia Plan    ASA 3     MAC     Anesthetic plan, all risks, benefits, and alternatives have been provided, discussed and informed consent has been obtained with: patient.

## 2019-05-10 NOTE — H&P
[facility]  Pre Procedure History & Physical    Chief Complaint:   Rectal bleeding    Subjective     HPI:   This 78-year-old male presents to the endoscopy suite for colonoscopic evaluation.  He has a history of rectal bleeding.  Last colonoscopy performed in May 2013.    Past Medical History:   Past Medical History:   Diagnosis Date   • ADHD (attention deficit hyperactivity disorder)    • Bronchiectasis (CMS/HCC)    • Colon polyp    • COPD (chronic obstructive pulmonary disease) (CMS/[facility])    • Diverticulosis    • On home oxygen therapy    • Pneumonia    • Prostate cancer (CMS/HCC) 4/22/2019       Past Surgical History:  Past Surgical History:   Procedure Laterality Date   • BRONCHOSCOPY N/A 4/30/2016    Procedure: BRONCHOSCOPY with BAL of right lower lobe and left  lower lobe.  ;  Surgeon: Nando Diaz MD;  Location: [facility] ENDOSCOPY;  Service:    • BRONCHOSCOPY N/A 4/28/2017    Procedure: BRONCHOSCOPY;  Surgeon: Katharina Salter MD;  Location: [facility] ENDOSCOPY;  Service:    • BRONCHOSCOPY Bilateral 6/29/2017    Procedure: BRONCHOSCOPY WITH WASHINGS;  Surgeon: Katharina Salter MD;  Location: [facility] ENDOSCOPY;  Service:    • BRONCHOSCOPY N/A 1/22/2018    Procedure: BRONCHOSCOPY AT BEDSIDE with BAL;  Surgeon: Katharina Salter MD;  Location: [facility] ENDOSCOPY;  Service:    • BRONCHOSCOPY N/A 1/30/2018    Procedure: BRONCHOSCOPY with BAL and washing;  Surgeon: Shreyas Wild MD;  Location: [facility] ENDOSCOPY;  Service:    • BRONCHOSCOPY Bilateral 4/24/2018    Procedure: BRONCHOSCOPY WITH WASHING;  Surgeon: Katharina Salter MD;  Location: [facility] ENDOSCOPY;  Service: Pulmonary   • COLONOSCOPY  05/17/2013    eh, ih, tort, sig tics   • ENDOSCOPY  03/19/2015    z line irreg, hh   • HERNIA REPAIR     • HIP OPEN REDUCTION Right 1/17/2018    Procedure: HIP OPEN REDUCTION INTERNAL FIXATION WITH DYNAMIC HIP SCREW;  Surgeon: Les Black MD;  Location: [facility] OR;  Service:    • SPINE SURGERY     •  TONSILLECTOMY         Family History:  Family History   Problem Relation Age of Onset   • Thyroid disease Mother    • No Known Problems Father        Social History:   reports that he has never smoked. He has never used smokeless tobacco. He reports that he does not drink alcohol or use drugs.    Medications:   No medications prior to admission.       Allergies:  Daliresp [roflumilast]; Latex; and Sulfa antibiotics    ROS:    Pertinent items are noted in HPI, all other systems reviewed and negative     Objective     There were no vitals taken for this visit.    Physical Exam   Constitutional: Pt is oriented to person, place, and time and well-developed, well-nourished, and in no distress.   Mouth/Throat: Oropharynx is clear and moist.   Neck: Normal range of motion.   Cardiovascular: Normal rate, regular rhythm and normal heart sounds.    Pulmonary/Chest: Effort normal and breath sounds normal.   Abdominal: Soft. Nontender  Skin: Skin is warm and dry.   Psychiatric: Mood, memory, affect and judgment normal.     Assessment/Plan     Diagnosis:  Rectal bleeding    Anticipated Surgical Procedure:  Colonoscopy    The risks, benefits, and alternatives of this procedure have been discussed with the patient or the responsible party- the patient understands and agrees to proceed.

## 2019-05-13 LAB
CYTO UR: NORMAL
LAB AP CASE REPORT: NORMAL
PATH REPORT.FINAL DX SPEC: NORMAL
PATH REPORT.GROSS SPEC: NORMAL

## 2019-05-22 ENCOUNTER — TELEPHONE (OUTPATIENT)
Dept: GASTROENTEROLOGY | Facility: CLINIC | Age: 79
End: 2019-05-22

## 2019-08-01 ENCOUNTER — OFFICE VISIT (OUTPATIENT)
Dept: FAMILY MEDICINE CLINIC | Facility: CLINIC | Age: 79
End: 2019-08-01

## 2019-08-01 VITALS
BODY MASS INDEX: 23.04 KG/M2 | TEMPERATURE: 97.8 F | HEART RATE: 73 BPM | HEIGHT: 71 IN | WEIGHT: 164.6 LBS | OXYGEN SATURATION: 94 % | DIASTOLIC BLOOD PRESSURE: 58 MMHG | SYSTOLIC BLOOD PRESSURE: 112 MMHG

## 2019-08-01 DIAGNOSIS — I50.32 CHRONIC DIASTOLIC CHF (CONGESTIVE HEART FAILURE) (HCC): ICD-10-CM

## 2019-08-01 DIAGNOSIS — E55.9 VITAMIN D DEFICIENCY: ICD-10-CM

## 2019-08-01 DIAGNOSIS — M15.9 PRIMARY OSTEOARTHRITIS INVOLVING MULTIPLE JOINTS: Primary | ICD-10-CM

## 2019-08-01 DIAGNOSIS — J42 CHRONIC BRONCHITIS, UNSPECIFIED CHRONIC BRONCHITIS TYPE (HCC): ICD-10-CM

## 2019-08-01 DIAGNOSIS — Z12.5 ENCOUNTER FOR SCREENING FOR MALIGNANT NEOPLASM OF PROSTATE: ICD-10-CM

## 2019-08-01 LAB
25(OH)D3 SERPL-MCNC: 44.2 NG/ML (ref 30–100)
ALBUMIN SERPL-MCNC: 3.8 G/DL (ref 3.5–5.2)
ALBUMIN/GLOB SERPL: 1.2 G/DL
ALP SERPL-CCNC: 82 U/L (ref 39–117)
ALT SERPL W P-5'-P-CCNC: 15 U/L (ref 1–41)
ANION GAP SERPL CALCULATED.3IONS-SCNC: 8.3 MMOL/L (ref 5–15)
AST SERPL-CCNC: 21 U/L (ref 1–40)
BILIRUB SERPL-MCNC: 0.8 MG/DL (ref 0.2–1.2)
BUN BLD-MCNC: 20 MG/DL (ref 8–23)
BUN/CREAT SERPL: 22.5 (ref 7–25)
CALCIUM SPEC-SCNC: 9.1 MG/DL (ref 8.6–10.5)
CHLORIDE SERPL-SCNC: 103 MMOL/L (ref 98–107)
CO2 SERPL-SCNC: 27.7 MMOL/L (ref 22–29)
CREAT BLD-MCNC: 0.89 MG/DL (ref 0.76–1.27)
DEPRECATED RDW RBC AUTO: 45.8 FL (ref 37–54)
ERYTHROCYTE [DISTWIDTH] IN BLOOD BY AUTOMATED COUNT: 12.8 % (ref 12.3–15.4)
GFR SERPL CREATININE-BSD FRML MDRD: 82 ML/MIN/1.73
GLOBULIN UR ELPH-MCNC: 3.2 GM/DL
GLUCOSE BLD-MCNC: 98 MG/DL (ref 65–99)
HCT VFR BLD AUTO: 41.8 % (ref 37.5–51)
HGB BLD-MCNC: 13.6 G/DL (ref 13–17.7)
MCH RBC QN AUTO: 31.9 PG (ref 26.6–33)
MCHC RBC AUTO-ENTMCNC: 32.5 G/DL (ref 31.5–35.7)
MCV RBC AUTO: 98.1 FL (ref 79–97)
PLATELET # BLD AUTO: 253 10*3/MM3 (ref 140–450)
PMV BLD AUTO: 9.8 FL (ref 6–12)
POTASSIUM BLD-SCNC: 4.7 MMOL/L (ref 3.5–5.2)
PROT SERPL-MCNC: 7 G/DL (ref 6–8.5)
PSA SERPL-MCNC: 3.02 NG/ML (ref 0–4)
RBC # BLD AUTO: 4.26 10*6/MM3 (ref 4.14–5.8)
SODIUM BLD-SCNC: 139 MMOL/L (ref 136–145)
WBC NRBC COR # BLD: 7.05 10*3/MM3 (ref 3.4–10.8)

## 2019-08-01 PROCEDURE — 36415 COLL VENOUS BLD VENIPUNCTURE: CPT | Performed by: INTERNAL MEDICINE

## 2019-08-01 PROCEDURE — 80053 COMPREHEN METABOLIC PANEL: CPT | Performed by: INTERNAL MEDICINE

## 2019-08-01 PROCEDURE — G0103 PSA SCREENING: HCPCS | Performed by: INTERNAL MEDICINE

## 2019-08-01 PROCEDURE — 99214 OFFICE O/P EST MOD 30 MIN: CPT | Performed by: INTERNAL MEDICINE

## 2019-08-01 PROCEDURE — 85027 COMPLETE CBC AUTOMATED: CPT | Performed by: INTERNAL MEDICINE

## 2019-08-01 PROCEDURE — 82306 VITAMIN D 25 HYDROXY: CPT | Performed by: INTERNAL MEDICINE

## 2019-08-01 RX ORDER — TRAMADOL HYDROCHLORIDE 50 MG/1
TABLET ORAL
Qty: 90 TABLET | Refills: 3 | Status: SHIPPED | OUTPATIENT
Start: 2019-08-01 | End: 2019-09-20

## 2019-08-01 RX ORDER — TIZANIDINE HYDROCHLORIDE 6 MG/1
CAPSULE, GELATIN COATED ORAL
Qty: 270 CAPSULE | Refills: 3 | Status: SHIPPED | OUTPATIENT
Start: 2019-08-01 | End: 2019-09-20

## 2019-08-01 RX ORDER — FLUOXETINE 20 MG/1
TABLET, FILM COATED ORAL
Status: ON HOLD | COMMUNITY
Start: 2019-06-13 | End: 2019-09-23

## 2019-08-01 RX ORDER — POTASSIUM CHLORIDE 20 MEQ/1
TABLET, EXTENDED RELEASE ORAL
COMMUNITY
Start: 2019-06-06 | End: 2019-09-20

## 2019-08-01 RX ORDER — METHYLPHENIDATE HYDROCHLORIDE 10 MG/1
10 TABLET ORAL DAILY
Qty: 90 TABLET | Refills: 0 | Status: SHIPPED | OUTPATIENT
Start: 2019-08-01 | End: 2019-11-25 | Stop reason: SDUPTHER

## 2019-08-01 RX ORDER — BUDESONIDE 0.5 MG/2ML
INHALANT ORAL
Qty: 180 EACH | Refills: 3 | Status: SHIPPED | OUTPATIENT
Start: 2019-08-01 | End: 2020-09-21

## 2019-08-01 RX ORDER — FUROSEMIDE 40 MG/1
TABLET ORAL
COMMUNITY
Start: 2019-06-06 | End: 2019-09-20

## 2019-08-01 NOTE — PROGRESS NOTES
Subjective   Tony Leonard is a 79 y.o. male.     History of Present Illness   Patient was seen for primary osteoarthritis.  Patient had CT done on the cervical, thoracic, lumbar spine, and 2018.  They showed extensive degenerative changes along the entire cervical lumbar spine.  Patient referred to pain clinic.  His hydrocodone is not relieving the pain.  Patient was taken off hydrocodone for home tramadol and advised to take it with Tylenol.  Patient also has congestive heart failure is been stable over the past 6 months.  Bronchitis is being relieved with his bronchodilators.    Dictated utilizing Dragon dictation. If there are questions or for further clarification, please contact me.  The following portions of the patient's history were reviewed and updated as appropriate: allergies, current medications, past family history, past medical history, past social history, past surgical history and problem list.    Review of Systems   Constitutional: Negative for fatigue and fever.   HENT: Positive for congestion. Negative for trouble swallowing.    Eyes: Negative for discharge and visual disturbance.   Respiratory: Negative for choking and shortness of breath.    Cardiovascular: Negative for chest pain and palpitations.   Gastrointestinal: Negative for abdominal pain and blood in stool.   Endocrine: Negative.    Genitourinary: Negative for genital sores and hematuria.   Musculoskeletal: Positive for back pain, gait problem, joint swelling and neck pain.   Skin: Negative for color change, pallor, rash and wound.   Allergic/Immunologic: Positive for environmental allergies. Negative for immunocompromised state.   Neurological: Negative for facial asymmetry and speech difficulty.   Psychiatric/Behavioral: Negative for hallucinations and suicidal ideas.       Objective   Physical Exam   Constitutional: He is oriented to person, place, and time. He appears well-developed and well-nourished.   HENT:   Head:  Normocephalic.   Eyes: Conjunctivae are normal. Pupils are equal, round, and reactive to light.   Neck: Normal range of motion. Neck supple.   Cardiovascular: Normal rate, regular rhythm and normal heart sounds.   Pulmonary/Chest: Effort normal and breath sounds normal.   Abdominal: Soft. Bowel sounds are normal.   Musculoskeletal: He exhibits edema, tenderness and deformity.   Neurological: He is alert and oriented to person, place, and time. He exhibits abnormal muscle tone. Coordination abnormal.   Skin: Skin is warm and dry.   Psychiatric: He has a normal mood and affect. His behavior is normal. Judgment and thought content normal.   Nursing note and vitals reviewed.      Assessment/Plan   Problems Addressed this Visit        Cardiovascular and Mediastinum    Chronic diastolic CHF (congestive heart failure) (CMS/[facility])    Relevant Orders    CBC (No Diff)    Comprehensive Metabolic Panel    Vitamin D 25 Hydroxy    PSA Screen       Respiratory    COPD (chronic obstructive pulmonary disease) (CMS/[facility])    Relevant Medications    budesonide (PULMICORT) 0.5 MG/2ML nebulizer solution    Other Relevant Orders    CBC (No Diff)    Comprehensive Metabolic Panel    Vitamin D 25 Hydroxy    PSA Screen       Musculoskeletal and Integument    Primary osteoarthritis involving multiple joints - Primary    Relevant Orders    Ambulatory Referral to Pain Management    CBC (No Diff)    Comprehensive Metabolic Panel    Vitamin D 25 Hydroxy    PSA Screen      Other Visit Diagnoses     Vitamin D deficiency         Relevant Orders    CBC (No Diff)    Comprehensive Metabolic Panel    Vitamin D 25 Hydroxy    PSA Screen    Encounter for screening for malignant neoplasm of prostate         Relevant Orders    PSA Screen

## 2019-08-02 ENCOUNTER — TELEPHONE (OUTPATIENT)
Dept: FAMILY MEDICINE CLINIC | Facility: CLINIC | Age: 79
End: 2019-08-02

## 2019-08-07 NOTE — TELEPHONE ENCOUNTER
Patient has appointment with Dr. Villanueva pain specialist that he made cause its closer to his house. NILAM

## 2019-09-06 NOTE — PATIENT INSTRUCTIONS
Physical Therapy Cancellation Note        PT orders received and chart reviewed  Per [facility] during morning rounds, pt planned for SX intervention tentatively on Monday or Tuesday  Will hold therapy at this time  Encouraged continued participation in mobility with non therapy staff  Please reconsult PT post op with updated activity orders  reviewed hospital records see above HPI reviewed labs and imaging, repeat CBC shows trending WBC and increased neutrophils, will cont cefdinir 300 BID x 1 wk and RTC after finish abx to re-eval, will refer to pulm for multiple cases of pna, COPD, cont eating and gaining weight s/t malnutrition in hospital, if fatigue worsens or SOA wheezing or coughing go to ER for eval, increase H20 and rest

## 2019-09-17 ENCOUNTER — TELEPHONE (OUTPATIENT)
Dept: FAMILY MEDICINE CLINIC | Facility: CLINIC | Age: 79
End: 2019-09-17

## 2019-09-20 RX ORDER — ROPINIROLE 0.5 MG/1
.5-1 TABLET, FILM COATED ORAL NIGHTLY
COMMUNITY
End: 2020-06-24 | Stop reason: SDUPTHER

## 2019-09-20 RX ORDER — ASPIRIN 325 MG
325 TABLET ORAL DAILY
COMMUNITY
End: 2020-03-06

## 2019-09-20 RX ORDER — TIZANIDINE HYDROCHLORIDE 6 MG/1
6 CAPSULE, GELATIN COATED ORAL NIGHTLY
COMMUNITY
End: 2020-03-06 | Stop reason: SDUPTHER

## 2019-09-23 ENCOUNTER — APPOINTMENT (OUTPATIENT)
Dept: GENERAL RADIOLOGY | Facility: HOSPITAL | Age: 79
End: 2019-09-23

## 2019-09-23 ENCOUNTER — ANESTHESIA (OUTPATIENT)
Dept: PERIOP | Facility: HOSPITAL | Age: 79
End: 2019-09-23

## 2019-09-23 ENCOUNTER — HOSPITAL ENCOUNTER (INPATIENT)
Facility: HOSPITAL | Age: 79
LOS: 5 days | Discharge: NURSING, REHAB OR LONG-TERM CARE | End: 2019-09-28
Attending: ORTHOPAEDIC SURGERY | Admitting: ORTHOPAEDIC SURGERY

## 2019-09-23 ENCOUNTER — ANESTHESIA EVENT (OUTPATIENT)
Dept: PERIOP | Facility: HOSPITAL | Age: 79
End: 2019-09-23

## 2019-09-23 DIAGNOSIS — M16.11 OSTEOARTHRITIS OF RIGHT HIP, UNSPECIFIED OSTEOARTHRITIS TYPE: Primary | ICD-10-CM

## 2019-09-23 DIAGNOSIS — M16.11 PRIMARY OSTEOARTHRITIS OF RIGHT HIP: ICD-10-CM

## 2019-09-23 PROBLEM — M16.9 DEGENERATIVE JOINT DISEASE (DJD) OF HIP: Status: ACTIVE | Noted: 2019-09-23

## 2019-09-23 LAB
ABO GROUP BLD: NORMAL
ALBUMIN SERPL-MCNC: 4.2 G/DL (ref 3.5–5.2)
ALBUMIN/GLOB SERPL: 1.6 G/DL
ALP SERPL-CCNC: 88 U/L (ref 39–117)
ALT SERPL W P-5'-P-CCNC: 10 U/L (ref 1–41)
ANION GAP SERPL CALCULATED.3IONS-SCNC: 12.9 MMOL/L (ref 5–15)
APTT PPP: 29.6 SECONDS (ref 22.7–35.4)
AST SERPL-CCNC: 18 U/L (ref 1–40)
BACTERIA UR QL AUTO: NORMAL /HPF
BASOPHILS # BLD AUTO: 0.08 10*3/MM3 (ref 0–0.2)
BASOPHILS NFR BLD AUTO: 1.1 % (ref 0–1.5)
BILIRUB SERPL-MCNC: 0.7 MG/DL (ref 0.2–1.2)
BILIRUB UR QL STRIP: NEGATIVE
BLD GP AB SCN SERPL QL: NEGATIVE
BUN BLD-MCNC: 19 MG/DL (ref 8–23)
BUN/CREAT SERPL: 24.1 (ref 7–25)
CALCIUM SPEC-SCNC: 8.9 MG/DL (ref 8.6–10.5)
CHLORIDE SERPL-SCNC: 101 MMOL/L (ref 98–107)
CLARITY UR: CLEAR
CO2 SERPL-SCNC: 24.1 MMOL/L (ref 22–29)
COLOR UR: YELLOW
CREAT BLD-MCNC: 0.79 MG/DL (ref 0.76–1.27)
DEPRECATED RDW RBC AUTO: 42.9 FL (ref 37–54)
EOSINOPHIL # BLD AUTO: 1.33 10*3/MM3 (ref 0–0.4)
EOSINOPHIL NFR BLD AUTO: 18.3 % (ref 0.3–6.2)
ERYTHROCYTE [DISTWIDTH] IN BLOOD BY AUTOMATED COUNT: 12.3 % (ref 12.3–15.4)
GFR SERPL CREATININE-BSD FRML MDRD: 94 ML/MIN/1.73
GLOBULIN UR ELPH-MCNC: 2.6 GM/DL
GLUCOSE BLD-MCNC: 84 MG/DL (ref 65–99)
GLUCOSE BLDC GLUCOMTR-MCNC: 106 MG/DL (ref 70–130)
GLUCOSE UR STRIP-MCNC: NEGATIVE MG/DL
HCT VFR BLD AUTO: 41.5 % (ref 37.5–51)
HGB BLD-MCNC: 13.6 G/DL (ref 13–17.7)
HGB UR QL STRIP.AUTO: ABNORMAL
HYALINE CASTS UR QL AUTO: NORMAL /LPF
IMM GRANULOCYTES # BLD AUTO: 0.01 10*3/MM3 (ref 0–0.05)
IMM GRANULOCYTES NFR BLD AUTO: 0.1 % (ref 0–0.5)
INR PPP: 1.02 (ref 0.9–1.1)
KETONES UR QL STRIP: ABNORMAL
LEUKOCYTE ESTERASE UR QL STRIP.AUTO: NEGATIVE
LYMPHOCYTES # BLD AUTO: 1.92 10*3/MM3 (ref 0.7–3.1)
LYMPHOCYTES NFR BLD AUTO: 26.5 % (ref 19.6–45.3)
MCH RBC QN AUTO: 31.1 PG (ref 26.6–33)
MCHC RBC AUTO-ENTMCNC: 32.8 G/DL (ref 31.5–35.7)
MCV RBC AUTO: 94.7 FL (ref 79–97)
MONOCYTES # BLD AUTO: 0.67 10*3/MM3 (ref 0.1–0.9)
MONOCYTES NFR BLD AUTO: 9.2 % (ref 5–12)
NEUTROPHILS # BLD AUTO: 3.24 10*3/MM3 (ref 1.7–7)
NEUTROPHILS NFR BLD AUTO: 44.8 % (ref 42.7–76)
NITRITE UR QL STRIP: NEGATIVE
NRBC BLD AUTO-RTO: 0 /100 WBC (ref 0–0.2)
PH UR STRIP.AUTO: <=5 [PH] (ref 5–8)
PLATELET # BLD AUTO: 227 10*3/MM3 (ref 140–450)
PMV BLD AUTO: 9.1 FL (ref 6–12)
POTASSIUM BLD-SCNC: 4.8 MMOL/L (ref 3.5–5.2)
PROT SERPL-MCNC: 6.8 G/DL (ref 6–8.5)
PROT UR QL STRIP: NEGATIVE
PROTHROMBIN TIME: 13.1 SECONDS (ref 11.7–14.2)
RBC # BLD AUTO: 4.38 10*6/MM3 (ref 4.14–5.8)
RBC # UR: NORMAL /HPF
REF LAB TEST METHOD: NORMAL
RH BLD: POSITIVE
SODIUM BLD-SCNC: 138 MMOL/L (ref 136–145)
SP GR UR STRIP: 1.02 (ref 1–1.03)
SQUAMOUS #/AREA URNS HPF: NORMAL /HPF
T&S EXPIRATION DATE: NORMAL
UROBILINOGEN UR QL STRIP: ABNORMAL
WBC NRBC COR # BLD: 7.25 10*3/MM3 (ref 3.4–10.8)
WBC UR QL AUTO: NORMAL /HPF

## 2019-09-23 PROCEDURE — 93005 ELECTROCARDIOGRAM TRACING: CPT | Performed by: ORTHOPAEDIC SURGERY

## 2019-09-23 PROCEDURE — 93010 ELECTROCARDIOGRAM REPORT: CPT | Performed by: INTERNAL MEDICINE

## 2019-09-23 PROCEDURE — 25010000002 VANCOMYCIN 10 G RECONSTITUTED SOLUTION: Performed by: ORTHOPAEDIC SURGERY

## 2019-09-23 PROCEDURE — C1713 ANCHOR/SCREW BN/BN,TIS/BN: HCPCS | Performed by: ORTHOPAEDIC SURGERY

## 2019-09-23 PROCEDURE — 25010000003 CEFAZOLIN IN DEXTROSE 2-4 GM/100ML-% SOLUTION: Performed by: ORTHOPAEDIC SURGERY

## 2019-09-23 PROCEDURE — 25010000002 PROPOFOL 10 MG/ML EMULSION: Performed by: ANESTHESIOLOGY

## 2019-09-23 PROCEDURE — 85730 THROMBOPLASTIN TIME PARTIAL: CPT | Performed by: ORTHOPAEDIC SURGERY

## 2019-09-23 PROCEDURE — 25010000003 CEFAZOLIN IN DEXTROSE 2-4 GM/100ML-% SOLUTION: Performed by: ANESTHESIOLOGY

## 2019-09-23 PROCEDURE — 82962 GLUCOSE BLOOD TEST: CPT

## 2019-09-23 PROCEDURE — 80053 COMPREHEN METABOLIC PANEL: CPT | Performed by: ORTHOPAEDIC SURGERY

## 2019-09-23 PROCEDURE — C9290 INJ, BUPIVACAINE LIPOSOME: HCPCS | Performed by: ORTHOPAEDIC SURGERY

## 2019-09-23 PROCEDURE — 81001 URINALYSIS AUTO W/SCOPE: CPT | Performed by: ORTHOPAEDIC SURGERY

## 2019-09-23 PROCEDURE — 94799 UNLISTED PULMONARY SVC/PX: CPT

## 2019-09-23 PROCEDURE — C1776 JOINT DEVICE (IMPLANTABLE): HCPCS | Performed by: ORTHOPAEDIC SURGERY

## 2019-09-23 PROCEDURE — 73501 X-RAY EXAM HIP UNI 1 VIEW: CPT

## 2019-09-23 PROCEDURE — 86850 RBC ANTIBODY SCREEN: CPT | Performed by: ORTHOPAEDIC SURGERY

## 2019-09-23 PROCEDURE — 25010000002 HYDROMORPHONE PER 4 MG: Performed by: ANESTHESIOLOGY

## 2019-09-23 PROCEDURE — 25010000002 FENTANYL CITRATE (PF) 100 MCG/2ML SOLUTION: Performed by: ANESTHESIOLOGY

## 2019-09-23 PROCEDURE — 25010000002 PHENYLEPHRINE PER 1 ML: Performed by: ANESTHESIOLOGY

## 2019-09-23 PROCEDURE — 94640 AIRWAY INHALATION TREATMENT: CPT

## 2019-09-23 PROCEDURE — 71046 X-RAY EXAM CHEST 2 VIEWS: CPT

## 2019-09-23 PROCEDURE — 25010000002 NEOSTIGMINE PER 0.5 MG: Performed by: ANESTHESIOLOGY

## 2019-09-23 PROCEDURE — 0QP604Z REMOVAL OF INTERNAL FIXATION DEVICE FROM RIGHT UPPER FEMUR, OPEN APPROACH: ICD-10-PCS | Performed by: ORTHOPAEDIC SURGERY

## 2019-09-23 PROCEDURE — 86900 BLOOD TYPING SEROLOGIC ABO: CPT | Performed by: ORTHOPAEDIC SURGERY

## 2019-09-23 PROCEDURE — 85610 PROTHROMBIN TIME: CPT | Performed by: ORTHOPAEDIC SURGERY

## 2019-09-23 PROCEDURE — 25010000002 ONDANSETRON PER 1 MG: Performed by: ANESTHESIOLOGY

## 2019-09-23 PROCEDURE — 25010000003 BUPIVACAINE LIPOSOME 1.3 % SUSPENSION 20 ML VIAL: Performed by: ORTHOPAEDIC SURGERY

## 2019-09-23 PROCEDURE — 25010000002 KETOROLAC TROMETHAMINE PER 15 MG: Performed by: ORTHOPAEDIC SURGERY

## 2019-09-23 PROCEDURE — 85025 COMPLETE CBC W/AUTO DIFF WBC: CPT | Performed by: ORTHOPAEDIC SURGERY

## 2019-09-23 PROCEDURE — 86901 BLOOD TYPING SEROLOGIC RH(D): CPT | Performed by: ORTHOPAEDIC SURGERY

## 2019-09-23 PROCEDURE — 73552 X-RAY EXAM OF FEMUR 2/>: CPT

## 2019-09-23 PROCEDURE — 90791 PSYCH DIAGNOSTIC EVALUATION: CPT | Performed by: SOCIAL WORKER

## 2019-09-23 PROCEDURE — 0SR906A REPLACEMENT OF RIGHT HIP JOINT WITH OXIDIZED ZIRCONIUM ON POLYETHYLENE SYNTHETIC SUBSTITUTE, UNCEMENTED, OPEN APPROACH: ICD-10-PCS | Performed by: ORTHOPAEDIC SURGERY

## 2019-09-23 DEVICE — OXINIUM FEMORAL HEAD 12/14 TAPER                                    36 MM -3
Type: IMPLANTABLE DEVICE | Site: HIP | Status: FUNCTIONAL
Brand: OXINIUM

## 2019-09-23 DEVICE — REFLECTION SPHERICAL HEAD SCREW 45MM
Type: IMPLANTABLE DEVICE | Site: HIP | Status: FUNCTIONAL
Brand: REFLECTION

## 2019-09-23 DEVICE — REDAPT 240MM SLEEVELESS REVISION                                    STEM SIZE 19 STANDARD OFFSET
Type: IMPLANTABLE DEVICE | Site: HIP | Status: FUNCTIONAL
Brand: REDAPT

## 2019-09-23 DEVICE — R3 3 HOLE ACETABULAR SHELL 54MM
Type: IMPLANTABLE DEVICE | Site: HIP | Status: FUNCTIONAL
Brand: R3 ACETABULAR

## 2019-09-23 DEVICE — R3 20 DEGREE XLPE ACETABULAR LINER                                    36MM INNER DIAMETER X OUTER DIAMETER 54MM
Type: IMPLANTABLE DEVICE | Site: HIP | Status: FUNCTIONAL
Brand: R3

## 2019-09-23 DEVICE — REFLECTION SPHERICAL HEAD SCREW 35MM
Type: IMPLANTABLE DEVICE | Site: HIP | Status: FUNCTIONAL
Brand: REFLECTION

## 2019-09-23 RX ORDER — ROPINIROLE 0.5 MG/1
0.5 TABLET, FILM COATED ORAL NIGHTLY
Status: DISCONTINUED | OUTPATIENT
Start: 2019-09-23 | End: 2019-09-28 | Stop reason: HOSPADM

## 2019-09-23 RX ORDER — ACETAMINOPHEN 325 MG/1
650 TABLET ORAL EVERY 6 HOURS PRN
COMMUNITY
End: 2020-01-22

## 2019-09-23 RX ORDER — SENNA AND DOCUSATE SODIUM 50; 8.6 MG/1; MG/1
2 TABLET, FILM COATED ORAL ONCE
Status: COMPLETED | OUTPATIENT
Start: 2019-09-23 | End: 2019-09-23

## 2019-09-23 RX ORDER — ALBUTEROL SULFATE 2.5 MG/3ML
2.5 SOLUTION RESPIRATORY (INHALATION) 2 TIMES DAILY
Status: DISCONTINUED | OUTPATIENT
Start: 2019-09-23 | End: 2019-09-24

## 2019-09-23 RX ORDER — MAGNESIUM HYDROXIDE 1200 MG/15ML
LIQUID ORAL AS NEEDED
Status: DISCONTINUED | OUTPATIENT
Start: 2019-09-23 | End: 2019-09-23 | Stop reason: HOSPADM

## 2019-09-23 RX ORDER — BISACODYL 10 MG
10 SUPPOSITORY, RECTAL RECTAL DAILY PRN
Status: DISCONTINUED | OUTPATIENT
Start: 2019-09-23 | End: 2019-09-28 | Stop reason: HOSPADM

## 2019-09-23 RX ORDER — HYDRALAZINE HYDROCHLORIDE 20 MG/ML
5 INJECTION INTRAMUSCULAR; INTRAVENOUS
Status: DISCONTINUED | OUTPATIENT
Start: 2019-09-23 | End: 2019-09-23 | Stop reason: HOSPADM

## 2019-09-23 RX ORDER — SODIUM CHLORIDE, SODIUM LACTATE, POTASSIUM CHLORIDE, CALCIUM CHLORIDE 600; 310; 30; 20 MG/100ML; MG/100ML; MG/100ML; MG/100ML
9 INJECTION, SOLUTION INTRAVENOUS CONTINUOUS
Status: DISCONTINUED | OUTPATIENT
Start: 2019-09-23 | End: 2019-09-28 | Stop reason: HOSPADM

## 2019-09-23 RX ORDER — TRAMADOL HYDROCHLORIDE 50 MG/1
50 TABLET ORAL EVERY 6 HOURS PRN
COMMUNITY
End: 2020-01-22

## 2019-09-23 RX ORDER — GABAPENTIN 100 MG/1
100 CAPSULE ORAL DAILY
Status: DISCONTINUED | OUTPATIENT
Start: 2019-09-23 | End: 2019-09-28 | Stop reason: HOSPADM

## 2019-09-23 RX ORDER — FAMOTIDINE 10 MG/ML
20 INJECTION, SOLUTION INTRAVENOUS ONCE
Status: COMPLETED | OUTPATIENT
Start: 2019-09-23 | End: 2019-09-23

## 2019-09-23 RX ORDER — BUDESONIDE 0.5 MG/2ML
0.5 INHALANT ORAL
Status: DISCONTINUED | OUTPATIENT
Start: 2019-09-23 | End: 2019-09-28 | Stop reason: HOSPADM

## 2019-09-23 RX ORDER — MIDAZOLAM HYDROCHLORIDE 1 MG/ML
1 INJECTION INTRAMUSCULAR; INTRAVENOUS
Status: DISCONTINUED | OUTPATIENT
Start: 2019-09-23 | End: 2019-09-23 | Stop reason: HOSPADM

## 2019-09-23 RX ORDER — ONDANSETRON 2 MG/ML
INJECTION INTRAMUSCULAR; INTRAVENOUS AS NEEDED
Status: DISCONTINUED | OUTPATIENT
Start: 2019-09-23 | End: 2019-09-23 | Stop reason: SURG

## 2019-09-23 RX ORDER — ACETAMINOPHEN 325 MG/1
650 TABLET ORAL EVERY 4 HOURS PRN
Status: DISCONTINUED | OUTPATIENT
Start: 2019-09-23 | End: 2019-09-28 | Stop reason: HOSPADM

## 2019-09-23 RX ORDER — IPRATROPIUM BROMIDE AND ALBUTEROL SULFATE 2.5; .5 MG/3ML; MG/3ML
3 SOLUTION RESPIRATORY (INHALATION) ONCE
Status: COMPLETED | OUTPATIENT
Start: 2019-09-23 | End: 2019-09-23

## 2019-09-23 RX ORDER — DIPHENHYDRAMINE HYDROCHLORIDE 50 MG/ML
12.5 INJECTION INTRAMUSCULAR; INTRAVENOUS
Status: DISCONTINUED | OUTPATIENT
Start: 2019-09-23 | End: 2019-09-23 | Stop reason: HOSPADM

## 2019-09-23 RX ORDER — PROMETHAZINE HYDROCHLORIDE 25 MG/ML
6.25 INJECTION, SOLUTION INTRAMUSCULAR; INTRAVENOUS ONCE AS NEEDED
Status: DISCONTINUED | OUTPATIENT
Start: 2019-09-23 | End: 2019-09-23 | Stop reason: HOSPADM

## 2019-09-23 RX ORDER — LABETALOL HYDROCHLORIDE 5 MG/ML
5 INJECTION, SOLUTION INTRAVENOUS
Status: DISCONTINUED | OUTPATIENT
Start: 2019-09-23 | End: 2019-09-23 | Stop reason: HOSPADM

## 2019-09-23 RX ORDER — CEFAZOLIN SODIUM 2 G/100ML
2 INJECTION, SOLUTION INTRAVENOUS EVERY 8 HOURS
Status: COMPLETED | OUTPATIENT
Start: 2019-09-23 | End: 2019-09-24

## 2019-09-23 RX ORDER — FENTANYL CITRATE 50 UG/ML
INJECTION, SOLUTION INTRAMUSCULAR; INTRAVENOUS AS NEEDED
Status: DISCONTINUED | OUTPATIENT
Start: 2019-09-23 | End: 2019-09-23 | Stop reason: SURG

## 2019-09-23 RX ORDER — PROMETHAZINE HYDROCHLORIDE 25 MG/1
25 TABLET ORAL ONCE AS NEEDED
Status: DISCONTINUED | OUTPATIENT
Start: 2019-09-23 | End: 2019-09-23 | Stop reason: HOSPADM

## 2019-09-23 RX ORDER — SODIUM CHLORIDE 0.9 % (FLUSH) 0.9 %
3-10 SYRINGE (ML) INJECTION AS NEEDED
Status: DISCONTINUED | OUTPATIENT
Start: 2019-09-23 | End: 2019-09-23 | Stop reason: HOSPADM

## 2019-09-23 RX ORDER — ACETAMINOPHEN 160 MG/5ML
650 SOLUTION ORAL EVERY 4 HOURS PRN
Status: DISCONTINUED | OUTPATIENT
Start: 2019-09-23 | End: 2019-09-28 | Stop reason: HOSPADM

## 2019-09-23 RX ORDER — ASPIRIN 325 MG
325 TABLET, DELAYED RELEASE (ENTERIC COATED) ORAL DAILY
Status: DISCONTINUED | OUTPATIENT
Start: 2019-09-23 | End: 2019-09-28 | Stop reason: HOSPADM

## 2019-09-23 RX ORDER — PROPOFOL 10 MG/ML
VIAL (ML) INTRAVENOUS AS NEEDED
Status: DISCONTINUED | OUTPATIENT
Start: 2019-09-23 | End: 2019-09-23 | Stop reason: SURG

## 2019-09-23 RX ORDER — ACETAMINOPHEN 325 MG/1
325 TABLET ORAL EVERY 4 HOURS PRN
Status: DISCONTINUED | OUTPATIENT
Start: 2019-09-23 | End: 2019-09-28 | Stop reason: HOSPADM

## 2019-09-23 RX ORDER — TRANEXAMIC ACID 100 MG/ML
INJECTION, SOLUTION INTRAVENOUS AS NEEDED
Status: DISCONTINUED | OUTPATIENT
Start: 2019-09-23 | End: 2019-09-23 | Stop reason: SURG

## 2019-09-23 RX ORDER — ACETAMINOPHEN 650 MG/1
650 SUPPOSITORY RECTAL EVERY 4 HOURS PRN
Status: DISCONTINUED | OUTPATIENT
Start: 2019-09-23 | End: 2019-09-28 | Stop reason: HOSPADM

## 2019-09-23 RX ORDER — POTASSIUM CHLORIDE 1500 MG/1
20 TABLET, FILM COATED, EXTENDED RELEASE ORAL DAILY PRN
COMMUNITY
End: 2020-01-22

## 2019-09-23 RX ORDER — KETOROLAC TROMETHAMINE 15 MG/ML
15 INJECTION, SOLUTION INTRAMUSCULAR; INTRAVENOUS EVERY 6 HOURS
Status: COMPLETED | OUTPATIENT
Start: 2019-09-23 | End: 2019-09-24

## 2019-09-23 RX ORDER — FUROSEMIDE 40 MG/1
40 TABLET ORAL DAILY PRN
COMMUNITY
End: 2020-01-22

## 2019-09-23 RX ORDER — SODIUM CHLORIDE, SODIUM LACTATE, POTASSIUM CHLORIDE, CALCIUM CHLORIDE 600; 310; 30; 20 MG/100ML; MG/100ML; MG/100ML; MG/100ML
100 INJECTION, SOLUTION INTRAVENOUS CONTINUOUS
Status: DISCONTINUED | OUTPATIENT
Start: 2019-09-23 | End: 2019-09-26

## 2019-09-23 RX ORDER — METHYLPHENIDATE HYDROCHLORIDE 10 MG/1
10 TABLET ORAL DAILY
Status: DISCONTINUED | OUTPATIENT
Start: 2019-09-23 | End: 2019-09-28 | Stop reason: HOSPADM

## 2019-09-23 RX ORDER — LIDOCAINE HYDROCHLORIDE 10 MG/ML
0.5 INJECTION, SOLUTION EPIDURAL; INFILTRATION; INTRACAUDAL; PERINEURAL ONCE AS NEEDED
Status: DISCONTINUED | OUTPATIENT
Start: 2019-09-23 | End: 2019-09-23 | Stop reason: HOSPADM

## 2019-09-23 RX ORDER — HYDROCODONE BITARTRATE AND ACETAMINOPHEN 5; 325 MG/1; MG/1
2 TABLET ORAL EVERY 4 HOURS PRN
Status: DISCONTINUED | OUTPATIENT
Start: 2019-09-23 | End: 2019-09-28 | Stop reason: HOSPADM

## 2019-09-23 RX ORDER — HYDROCODONE BITARTRATE AND ACETAMINOPHEN 5; 325 MG/1; MG/1
1 TABLET ORAL EVERY 4 HOURS PRN
Status: DISCONTINUED | OUTPATIENT
Start: 2019-09-23 | End: 2019-09-28 | Stop reason: HOSPADM

## 2019-09-23 RX ORDER — HYDROMORPHONE HYDROCHLORIDE 1 MG/ML
0.25 INJECTION, SOLUTION INTRAMUSCULAR; INTRAVENOUS; SUBCUTANEOUS
Status: DISCONTINUED | OUTPATIENT
Start: 2019-09-23 | End: 2019-09-23 | Stop reason: HOSPADM

## 2019-09-23 RX ORDER — BISACODYL 5 MG/1
10 TABLET, DELAYED RELEASE ORAL DAILY PRN
Status: DISCONTINUED | OUTPATIENT
Start: 2019-09-23 | End: 2019-09-28 | Stop reason: HOSPADM

## 2019-09-23 RX ORDER — FENTANYL CITRATE 50 UG/ML
50 INJECTION, SOLUTION INTRAMUSCULAR; INTRAVENOUS
Status: DISCONTINUED | OUTPATIENT
Start: 2019-09-23 | End: 2019-09-23 | Stop reason: HOSPADM

## 2019-09-23 RX ORDER — SODIUM CHLORIDE 0.9 % (FLUSH) 0.9 %
3 SYRINGE (ML) INJECTION EVERY 12 HOURS SCHEDULED
Status: DISCONTINUED | OUTPATIENT
Start: 2019-09-23 | End: 2019-09-23 | Stop reason: HOSPADM

## 2019-09-23 RX ORDER — CEFAZOLIN SODIUM 2 G/100ML
INJECTION, SOLUTION INTRAVENOUS AS NEEDED
Status: DISCONTINUED | OUTPATIENT
Start: 2019-09-23 | End: 2019-09-23 | Stop reason: SURG

## 2019-09-23 RX ORDER — ACETAMINOPHEN 500 MG
1000 TABLET ORAL ONCE
Status: COMPLETED | OUTPATIENT
Start: 2019-09-23 | End: 2019-09-23

## 2019-09-23 RX ORDER — ONDANSETRON 2 MG/ML
4 INJECTION INTRAMUSCULAR; INTRAVENOUS EVERY 6 HOURS PRN
Status: DISCONTINUED | OUTPATIENT
Start: 2019-09-23 | End: 2019-09-28 | Stop reason: HOSPADM

## 2019-09-23 RX ORDER — MEPERIDINE HYDROCHLORIDE 25 MG/ML
12.5 INJECTION INTRAMUSCULAR; INTRAVENOUS; SUBCUTANEOUS
Status: DISCONTINUED | OUTPATIENT
Start: 2019-09-23 | End: 2019-09-23 | Stop reason: HOSPADM

## 2019-09-23 RX ORDER — ACETAMINOPHEN 325 MG/1
650 TABLET ORAL ONCE AS NEEDED
Status: DISCONTINUED | OUTPATIENT
Start: 2019-09-23 | End: 2019-09-23 | Stop reason: HOSPADM

## 2019-09-23 RX ORDER — ROCURONIUM BROMIDE 10 MG/ML
INJECTION, SOLUTION INTRAVENOUS AS NEEDED
Status: DISCONTINUED | OUTPATIENT
Start: 2019-09-23 | End: 2019-09-23 | Stop reason: SURG

## 2019-09-23 RX ORDER — FERROUS SULFATE 325(65) MG
325 TABLET ORAL
Status: DISCONTINUED | OUTPATIENT
Start: 2019-09-24 | End: 2019-09-28 | Stop reason: HOSPADM

## 2019-09-23 RX ORDER — GLYCOPYRROLATE 0.2 MG/ML
INJECTION INTRAMUSCULAR; INTRAVENOUS AS NEEDED
Status: DISCONTINUED | OUTPATIENT
Start: 2019-09-23 | End: 2019-09-23 | Stop reason: SURG

## 2019-09-23 RX ORDER — ONDANSETRON 4 MG/1
4 TABLET, FILM COATED ORAL EVERY 6 HOURS PRN
Status: DISCONTINUED | OUTPATIENT
Start: 2019-09-23 | End: 2019-09-28 | Stop reason: HOSPADM

## 2019-09-23 RX ORDER — PANTOPRAZOLE SODIUM 40 MG/1
40 TABLET, DELAYED RELEASE ORAL DAILY
Status: DISCONTINUED | OUTPATIENT
Start: 2019-09-23 | End: 2019-09-28 | Stop reason: HOSPADM

## 2019-09-23 RX ORDER — FLUOXETINE HYDROCHLORIDE 20 MG/1
20 CAPSULE ORAL DAILY
Status: DISCONTINUED | OUTPATIENT
Start: 2019-09-23 | End: 2019-09-28 | Stop reason: HOSPADM

## 2019-09-23 RX ORDER — PROMETHAZINE HYDROCHLORIDE 25 MG/1
25 SUPPOSITORY RECTAL ONCE AS NEEDED
Status: DISCONTINUED | OUTPATIENT
Start: 2019-09-23 | End: 2019-09-23 | Stop reason: HOSPADM

## 2019-09-23 RX ADMIN — ROCURONIUM BROMIDE 30 MG: 10 INJECTION INTRAVENOUS at 14:52

## 2019-09-23 RX ADMIN — IPRATROPIUM BROMIDE AND ALBUTEROL SULFATE 3 ML: 2.5; .5 SOLUTION RESPIRATORY (INHALATION) at 13:00

## 2019-09-23 RX ADMIN — FENTANYL CITRATE 100 MCG: 50 INJECTION INTRAMUSCULAR; INTRAVENOUS at 14:50

## 2019-09-23 RX ADMIN — HYDROCODONE BITARTRATE AND ACETAMINOPHEN 1 TABLET: 5; 325 TABLET ORAL at 21:10

## 2019-09-23 RX ADMIN — HYDROMORPHONE HYDROCHLORIDE 0.25 MG: 1 INJECTION, SOLUTION INTRAMUSCULAR; INTRAVENOUS; SUBCUTANEOUS at 17:58

## 2019-09-23 RX ADMIN — ASPIRIN 325 MG: 325 TABLET, COATED ORAL at 23:23

## 2019-09-23 RX ADMIN — PANTOPRAZOLE SODIUM 40 MG: 40 TABLET, DELAYED RELEASE ORAL at 23:23

## 2019-09-23 RX ADMIN — MUPIROCIN 1 APPLICATION: 20 OINTMENT TOPICAL at 13:45

## 2019-09-23 RX ADMIN — HYDROMORPHONE HYDROCHLORIDE 0.25 MG: 1 INJECTION, SOLUTION INTRAMUSCULAR; INTRAVENOUS; SUBCUTANEOUS at 18:08

## 2019-09-23 RX ADMIN — METHYLPHENIDATE HYDROCHLORIDE 10 MG: 10 TABLET ORAL at 23:23

## 2019-09-23 RX ADMIN — FENTANYL CITRATE 50 MCG: 50 INJECTION INTRAMUSCULAR; INTRAVENOUS at 17:43

## 2019-09-23 RX ADMIN — SENNOSIDES AND DOCUSATE SODIUM 2 TABLET: 8.6; 5 TABLET ORAL at 21:09

## 2019-09-23 RX ADMIN — PROPOFOL 100 MG: 10 INJECTION, EMULSION INTRAVENOUS at 14:53

## 2019-09-23 RX ADMIN — SODIUM CHLORIDE, POTASSIUM CHLORIDE, SODIUM LACTATE AND CALCIUM CHLORIDE: 600; 310; 30; 20 INJECTION, SOLUTION INTRAVENOUS at 16:32

## 2019-09-23 RX ADMIN — ROPINIROLE HYDROCHLORIDE 0.5 MG: 0.5 TABLET, FILM COATED ORAL at 21:10

## 2019-09-23 RX ADMIN — PHENYLEPHRINE HYDROCHLORIDE 100 MCG: 10 INJECTION INTRAVENOUS at 16:20

## 2019-09-23 RX ADMIN — NEOSTIGMINE METHYLSULFATE 3 MG: 1 INJECTION INTRAMUSCULAR; INTRAVENOUS; SUBCUTANEOUS at 16:36

## 2019-09-23 RX ADMIN — FLUOXETINE HYDROCHLORIDE 20 MG: 20 CAPSULE ORAL at 21:10

## 2019-09-23 RX ADMIN — GABAPENTIN 100 MG: 100 CAPSULE ORAL at 21:09

## 2019-09-23 RX ADMIN — VANCOMYCIN HYDROCHLORIDE 1500 MG: 10 INJECTION, POWDER, LYOPHILIZED, FOR SOLUTION INTRAVENOUS at 13:35

## 2019-09-23 RX ADMIN — CEFAZOLIN SODIUM 2 G: 2 INJECTION, SOLUTION INTRAVENOUS at 16:11

## 2019-09-23 RX ADMIN — KETOROLAC TROMETHAMINE 15 MG: 15 INJECTION, SOLUTION INTRAMUSCULAR; INTRAVENOUS at 21:10

## 2019-09-23 RX ADMIN — PHENYLEPHRINE HYDROCHLORIDE 200 MCG: 10 INJECTION INTRAVENOUS at 16:30

## 2019-09-23 RX ADMIN — PHENYLEPHRINE HYDROCHLORIDE 200 MCG: 10 INJECTION INTRAVENOUS at 16:10

## 2019-09-23 RX ADMIN — BUDESONIDE 0.5 MG: 0.5 INHALANT RESPIRATORY (INHALATION) at 21:26

## 2019-09-23 RX ADMIN — SODIUM CHLORIDE, POTASSIUM CHLORIDE, SODIUM LACTATE AND CALCIUM CHLORIDE 100 ML/HR: 600; 310; 30; 20 INJECTION, SOLUTION INTRAVENOUS at 21:09

## 2019-09-23 RX ADMIN — ROCURONIUM BROMIDE 10 MG: 10 INJECTION INTRAVENOUS at 15:36

## 2019-09-23 RX ADMIN — FENTANYL CITRATE 50 MCG: 50 INJECTION INTRAMUSCULAR; INTRAVENOUS at 16:55

## 2019-09-23 RX ADMIN — GLYCOPYRROLATE 0.6 MG: 0.2 INJECTION INTRAMUSCULAR; INTRAVENOUS at 16:36

## 2019-09-23 RX ADMIN — CEFAZOLIN SODIUM 2 G: 2 INJECTION, SOLUTION INTRAVENOUS at 21:10

## 2019-09-23 RX ADMIN — ALBUTEROL SULFATE 2.5 MG: 2.5 SOLUTION RESPIRATORY (INHALATION) at 21:26

## 2019-09-23 RX ADMIN — ONDANSETRON 4 MG: 2 INJECTION INTRAMUSCULAR; INTRAVENOUS at 16:16

## 2019-09-23 RX ADMIN — ACETAMINOPHEN 1000 MG: 500 TABLET, FILM COATED ORAL at 13:35

## 2019-09-23 RX ADMIN — SODIUM CHLORIDE, POTASSIUM CHLORIDE, SODIUM LACTATE AND CALCIUM CHLORIDE 9 ML/HR: 600; 310; 30; 20 INJECTION, SOLUTION INTRAVENOUS at 14:16

## 2019-09-23 RX ADMIN — FENTANYL CITRATE 50 MCG: 50 INJECTION INTRAMUSCULAR; INTRAVENOUS at 16:51

## 2019-09-23 RX ADMIN — FENTANYL CITRATE 25 MCG: 50 INJECTION INTRAMUSCULAR; INTRAVENOUS at 16:21

## 2019-09-23 RX ADMIN — TRANEXAMIC ACID 1000 MG: 100 INJECTION, SOLUTION INTRAVENOUS at 16:09

## 2019-09-23 RX ADMIN — FAMOTIDINE 20 MG: 10 INJECTION, SOLUTION INTRAVENOUS at 14:16

## 2019-09-23 NOTE — ANESTHESIA POSTPROCEDURE EVALUATION
"Patient: Tony Leonard    Procedure Summary     Date:  09/23/19 Room / Location:  [facility] OR [address] [place] [address] MAIN OR    Anesthesia Start:  1443 Anesthesia Stop:  1652    Procedure:  HIP  REVISION RIGHT (Right Hip) Diagnosis:      Surgeon:  Isauro Warner MD Provider:  Bassam Causey MD    Anesthesia Type:  general ASA Status:  4          Anesthesia Type: general  Last vitals  BP   134/66 (09/23/19 1815)   Temp   36.4 °C (97.5 °F) (09/23/19 1648)   Pulse   74 (09/23/19 1815)   Resp   16 (09/23/19 1815)     SpO2   95 % (09/23/19 1815)     Post Anesthesia Care and Evaluation    Level of consciousness: awake  Pain management: adequate  Airway patency: patent  Anesthetic complications: No anesthetic complications    Cardiovascular status: acceptable  Respiratory status: acceptable  Hydration status: acceptable    Comments: /66   Pulse 74   Temp 36.4 °C (97.5 °F) (Oral)   Resp 16   Ht 180.3 cm (71\")   Wt 76.7 kg (169 lb 1.6 oz)   SpO2 95%   BMI 23.58 kg/m²         "

## 2019-09-23 NOTE — ANESTHESIA PREPROCEDURE EVALUATION
Anesthesia Evaluation     NPO Solid Status: > 8 hours             Airway   Mallampati: III  TM distance: >3 FB  Neck ROM: full  No difficulty expected  Dental - normal exam     Pulmonary    (+) pneumonia , COPD, wheezes,     ROS comment: Bronchiectasis  In Preop with coughing yellow sputum.  Has not had inhalers today.  Cant bear weight.    Cardiovascular - normal exam    (+) CHF,       Neuro/Psych  GI/Hepatic/Renal/Endo    (+)  GERD,      Musculoskeletal     Abdominal    Substance History      OB/GYN          Other   (+) arthritis   history of cancer                    Anesthesia Plan    ASA 4

## 2019-09-24 PROBLEM — Z99.81 ON HOME OXYGEN THERAPY: Status: ACTIVE | Noted: 2019-09-24

## 2019-09-24 LAB
ANION GAP SERPL CALCULATED.3IONS-SCNC: 11.1 MMOL/L (ref 5–15)
BUN BLD-MCNC: 14 MG/DL (ref 8–23)
BUN/CREAT SERPL: 21.2 (ref 7–25)
CALCIUM SPEC-SCNC: 7.6 MG/DL (ref 8.6–10.5)
CHLORIDE SERPL-SCNC: 100 MMOL/L (ref 98–107)
CO2 SERPL-SCNC: 23.9 MMOL/L (ref 22–29)
CREAT BLD-MCNC: 0.66 MG/DL (ref 0.76–1.27)
GFR SERPL CREATININE-BSD FRML MDRD: 116 ML/MIN/1.73
GLUCOSE BLD-MCNC: 94 MG/DL (ref 65–99)
GLUCOSE BLDC GLUCOMTR-MCNC: 121 MG/DL (ref 70–130)
GLUCOSE BLDC GLUCOMTR-MCNC: 138 MG/DL (ref 70–130)
GLUCOSE BLDC GLUCOMTR-MCNC: 151 MG/DL (ref 70–130)
HCT VFR BLD AUTO: 32.6 % (ref 37.5–51)
HGB BLD-MCNC: 10.9 G/DL (ref 13–17.7)
POTASSIUM BLD-SCNC: 4.6 MMOL/L (ref 3.5–5.2)
SODIUM BLD-SCNC: 135 MMOL/L (ref 136–145)

## 2019-09-24 PROCEDURE — 97110 THERAPEUTIC EXERCISES: CPT

## 2019-09-24 PROCEDURE — 97165 OT EVAL LOW COMPLEX 30 MIN: CPT

## 2019-09-24 PROCEDURE — 97161 PT EVAL LOW COMPLEX 20 MIN: CPT

## 2019-09-24 PROCEDURE — 94799 UNLISTED PULMONARY SVC/PX: CPT

## 2019-09-24 PROCEDURE — 82962 GLUCOSE BLOOD TEST: CPT

## 2019-09-24 PROCEDURE — 25010000002 KETOROLAC TROMETHAMINE PER 15 MG: Performed by: ORTHOPAEDIC SURGERY

## 2019-09-24 PROCEDURE — 97150 GROUP THERAPEUTIC PROCEDURES: CPT

## 2019-09-24 PROCEDURE — 97535 SELF CARE MNGMENT TRAINING: CPT

## 2019-09-24 PROCEDURE — 25010000003 CEFAZOLIN IN DEXTROSE 2-4 GM/100ML-% SOLUTION: Performed by: ORTHOPAEDIC SURGERY

## 2019-09-24 PROCEDURE — 85018 HEMOGLOBIN: CPT | Performed by: ORTHOPAEDIC SURGERY

## 2019-09-24 PROCEDURE — 85014 HEMATOCRIT: CPT | Performed by: ORTHOPAEDIC SURGERY

## 2019-09-24 PROCEDURE — 80048 BASIC METABOLIC PNL TOTAL CA: CPT | Performed by: ORTHOPAEDIC SURGERY

## 2019-09-24 RX ORDER — ALBUTEROL SULFATE 2.5 MG/3ML
2.5 SOLUTION RESPIRATORY (INHALATION)
Status: DISCONTINUED | OUTPATIENT
Start: 2019-09-24 | End: 2019-09-26

## 2019-09-24 RX ORDER — ALBUTEROL SULFATE 2.5 MG/3ML
2.5 SOLUTION RESPIRATORY (INHALATION) EVERY 6 HOURS PRN
Status: DISCONTINUED | OUTPATIENT
Start: 2019-09-24 | End: 2019-09-28 | Stop reason: HOSPADM

## 2019-09-24 RX ADMIN — GABAPENTIN 100 MG: 100 CAPSULE ORAL at 08:35

## 2019-09-24 RX ADMIN — ROPINIROLE HYDROCHLORIDE 0.5 MG: 0.5 TABLET, FILM COATED ORAL at 20:33

## 2019-09-24 RX ADMIN — ALBUTEROL SULFATE 2.5 MG: 2.5 SOLUTION RESPIRATORY (INHALATION) at 09:59

## 2019-09-24 RX ADMIN — PANTOPRAZOLE SODIUM 40 MG: 40 TABLET, DELAYED RELEASE ORAL at 08:35

## 2019-09-24 RX ADMIN — HYDROCODONE BITARTRATE AND ACETAMINOPHEN 2 TABLET: 5; 325 TABLET ORAL at 06:23

## 2019-09-24 RX ADMIN — KETOROLAC TROMETHAMINE 15 MG: 15 INJECTION, SOLUTION INTRAMUSCULAR; INTRAVENOUS at 06:22

## 2019-09-24 RX ADMIN — CEFAZOLIN SODIUM 2 G: 2 INJECTION, SOLUTION INTRAVENOUS at 06:33

## 2019-09-24 RX ADMIN — HYDROCODONE BITARTRATE AND ACETAMINOPHEN 2 TABLET: 5; 325 TABLET ORAL at 01:36

## 2019-09-24 RX ADMIN — FERROUS SULFATE TAB 325 MG (65 MG ELEMENTAL FE) 325 MG: 325 (65 FE) TAB at 08:36

## 2019-09-24 RX ADMIN — METHYLPHENIDATE HYDROCHLORIDE 10 MG: 10 TABLET ORAL at 08:35

## 2019-09-24 RX ADMIN — HYDROCODONE BITARTRATE AND ACETAMINOPHEN 2 TABLET: 5; 325 TABLET ORAL at 16:40

## 2019-09-24 RX ADMIN — BUDESONIDE 0.5 MG: 0.5 INHALANT RESPIRATORY (INHALATION) at 21:57

## 2019-09-24 RX ADMIN — BUDESONIDE 0.5 MG: 0.5 INHALANT RESPIRATORY (INHALATION) at 09:59

## 2019-09-24 RX ADMIN — KETOROLAC TROMETHAMINE 15 MG: 15 INJECTION, SOLUTION INTRAMUSCULAR; INTRAVENOUS at 01:40

## 2019-09-24 RX ADMIN — ASPIRIN 325 MG: 325 TABLET, COATED ORAL at 08:36

## 2019-09-24 RX ADMIN — FLUOXETINE HYDROCHLORIDE 20 MG: 20 CAPSULE ORAL at 08:36

## 2019-09-24 RX ADMIN — ALBUTEROL SULFATE 2.5 MG: 2.5 SOLUTION RESPIRATORY (INHALATION) at 21:57

## 2019-09-24 RX ADMIN — KETOROLAC TROMETHAMINE 15 MG: 15 INJECTION, SOLUTION INTRAMUSCULAR; INTRAVENOUS at 13:13

## 2019-09-25 PROCEDURE — 94799 UNLISTED PULMONARY SVC/PX: CPT

## 2019-09-25 PROCEDURE — 97110 THERAPEUTIC EXERCISES: CPT

## 2019-09-25 PROCEDURE — 97150 GROUP THERAPEUTIC PROCEDURES: CPT

## 2019-09-25 RX ORDER — FUROSEMIDE 20 MG/1
20 TABLET ORAL ONCE
Status: COMPLETED | OUTPATIENT
Start: 2019-09-25 | End: 2019-09-25

## 2019-09-25 RX ADMIN — HYDROCODONE BITARTRATE AND ACETAMINOPHEN 2 TABLET: 5; 325 TABLET ORAL at 06:49

## 2019-09-25 RX ADMIN — FUROSEMIDE 20 MG: 20 TABLET ORAL at 14:21

## 2019-09-25 RX ADMIN — HYDROCODONE BITARTRATE AND ACETAMINOPHEN 2 TABLET: 5; 325 TABLET ORAL at 23:03

## 2019-09-25 RX ADMIN — HYDROCODONE BITARTRATE AND ACETAMINOPHEN 2 TABLET: 5; 325 TABLET ORAL at 02:26

## 2019-09-25 RX ADMIN — FLUOXETINE HYDROCHLORIDE 20 MG: 20 CAPSULE ORAL at 08:14

## 2019-09-25 RX ADMIN — HYDROCODONE BITARTRATE AND ACETAMINOPHEN 2 TABLET: 5; 325 TABLET ORAL at 11:20

## 2019-09-25 RX ADMIN — METHYLPHENIDATE HYDROCHLORIDE 10 MG: 10 TABLET ORAL at 06:04

## 2019-09-25 RX ADMIN — ASPIRIN 325 MG: 325 TABLET, COATED ORAL at 08:14

## 2019-09-25 RX ADMIN — FERROUS SULFATE TAB 325 MG (65 MG ELEMENTAL FE) 325 MG: 325 (65 FE) TAB at 08:14

## 2019-09-25 RX ADMIN — ALBUTEROL SULFATE 2.5 MG: 2.5 SOLUTION RESPIRATORY (INHALATION) at 19:53

## 2019-09-25 RX ADMIN — PANTOPRAZOLE SODIUM 40 MG: 40 TABLET, DELAYED RELEASE ORAL at 08:14

## 2019-09-25 RX ADMIN — HYDROCODONE BITARTRATE AND ACETAMINOPHEN 2 TABLET: 5; 325 TABLET ORAL at 18:07

## 2019-09-25 RX ADMIN — ROPINIROLE HYDROCHLORIDE 0.5 MG: 0.5 TABLET, FILM COATED ORAL at 21:00

## 2019-09-25 RX ADMIN — BUDESONIDE 0.5 MG: 0.5 INHALANT RESPIRATORY (INHALATION) at 19:53

## 2019-09-25 RX ADMIN — GABAPENTIN 100 MG: 100 CAPSULE ORAL at 08:14

## 2019-09-25 RX ADMIN — ALBUTEROL SULFATE 2.5 MG: 2.5 SOLUTION RESPIRATORY (INHALATION) at 13:11

## 2019-09-25 RX ADMIN — BUDESONIDE 0.5 MG: 0.5 INHALANT RESPIRATORY (INHALATION) at 13:10

## 2019-09-26 ENCOUNTER — APPOINTMENT (OUTPATIENT)
Dept: GENERAL RADIOLOGY | Facility: HOSPITAL | Age: 79
End: 2019-09-26

## 2019-09-26 LAB
ANION GAP SERPL CALCULATED.3IONS-SCNC: 8.7 MMOL/L (ref 5–15)
BUN BLD-MCNC: 15 MG/DL (ref 8–23)
BUN/CREAT SERPL: 19.7 (ref 7–25)
CALCIUM SPEC-SCNC: 8.1 MG/DL (ref 8.6–10.5)
CHLORIDE SERPL-SCNC: 102 MMOL/L (ref 98–107)
CO2 SERPL-SCNC: 24.3 MMOL/L (ref 22–29)
CREAT BLD-MCNC: 0.76 MG/DL (ref 0.76–1.27)
GFR SERPL CREATININE-BSD FRML MDRD: 98 ML/MIN/1.73
GLUCOSE BLD-MCNC: 117 MG/DL (ref 65–99)
POTASSIUM BLD-SCNC: 4.4 MMOL/L (ref 3.5–5.2)
SODIUM BLD-SCNC: 135 MMOL/L (ref 136–145)

## 2019-09-26 PROCEDURE — 94799 UNLISTED PULMONARY SVC/PX: CPT

## 2019-09-26 PROCEDURE — 80048 BASIC METABOLIC PNL TOTAL CA: CPT | Performed by: NURSE PRACTITIONER

## 2019-09-26 PROCEDURE — 97110 THERAPEUTIC EXERCISES: CPT

## 2019-09-26 PROCEDURE — 97150 GROUP THERAPEUTIC PROCEDURES: CPT

## 2019-09-26 PROCEDURE — 73502 X-RAY EXAM HIP UNI 2-3 VIEWS: CPT

## 2019-09-26 RX ORDER — TIZANIDINE 4 MG/1
4 TABLET ORAL EVERY 8 HOURS PRN
Status: DISCONTINUED | OUTPATIENT
Start: 2019-09-26 | End: 2019-09-28 | Stop reason: HOSPADM

## 2019-09-26 RX ORDER — IPRATROPIUM BROMIDE AND ALBUTEROL SULFATE 2.5; .5 MG/3ML; MG/3ML
3 SOLUTION RESPIRATORY (INHALATION)
Status: DISCONTINUED | OUTPATIENT
Start: 2019-09-26 | End: 2019-09-28 | Stop reason: HOSPADM

## 2019-09-26 RX ORDER — AZITHROMYCIN 250 MG/1
250 TABLET, FILM COATED ORAL 3 TIMES WEEKLY
Status: DISCONTINUED | OUTPATIENT
Start: 2019-09-27 | End: 2019-09-28 | Stop reason: HOSPADM

## 2019-09-26 RX ORDER — FUROSEMIDE 40 MG/1
40 TABLET ORAL ONCE
Status: COMPLETED | OUTPATIENT
Start: 2019-09-26 | End: 2019-09-26

## 2019-09-26 RX ORDER — DOCUSATE SODIUM 100 MG/1
100 CAPSULE, LIQUID FILLED ORAL 2 TIMES DAILY
Status: DISCONTINUED | OUTPATIENT
Start: 2019-09-26 | End: 2019-09-28 | Stop reason: HOSPADM

## 2019-09-26 RX ORDER — SODIUM CHLORIDE FOR INHALATION 7 %
4 VIAL, NEBULIZER (ML) INHALATION EVERY 4 HOURS PRN
Status: DISCONTINUED | OUTPATIENT
Start: 2019-09-26 | End: 2019-09-28 | Stop reason: HOSPADM

## 2019-09-26 RX ORDER — METHOCARBAMOL 500 MG/1
500 TABLET, FILM COATED ORAL EVERY 8 HOURS PRN
Status: DISCONTINUED | OUTPATIENT
Start: 2019-09-26 | End: 2019-09-28 | Stop reason: HOSPADM

## 2019-09-26 RX ORDER — ALBUTEROL SULFATE 2.5 MG/3ML
2.5 SOLUTION RESPIRATORY (INHALATION) EVERY 6 HOURS PRN
Status: DISCONTINUED | OUTPATIENT
Start: 2019-09-26 | End: 2019-09-28 | Stop reason: HOSPADM

## 2019-09-26 RX ADMIN — HYDROCODONE BITARTRATE AND ACETAMINOPHEN 2 TABLET: 5; 325 TABLET ORAL at 05:58

## 2019-09-26 RX ADMIN — ALBUTEROL SULFATE 2.5 MG: 2.5 SOLUTION RESPIRATORY (INHALATION) at 13:30

## 2019-09-26 RX ADMIN — FUROSEMIDE 40 MG: 40 TABLET ORAL at 17:55

## 2019-09-26 RX ADMIN — HYDROCODONE BITARTRATE AND ACETAMINOPHEN 2 TABLET: 5; 325 TABLET ORAL at 15:23

## 2019-09-26 RX ADMIN — DOCUSATE SODIUM 100 MG: 100 CAPSULE, LIQUID FILLED ORAL at 21:11

## 2019-09-26 RX ADMIN — GABAPENTIN 100 MG: 100 CAPSULE ORAL at 09:22

## 2019-09-26 RX ADMIN — ROPINIROLE HYDROCHLORIDE 0.5 MG: 0.5 TABLET, FILM COATED ORAL at 21:11

## 2019-09-26 RX ADMIN — METHYLPHENIDATE HYDROCHLORIDE 10 MG: 10 TABLET ORAL at 05:25

## 2019-09-26 RX ADMIN — ASPIRIN 325 MG: 325 TABLET, COATED ORAL at 09:22

## 2019-09-26 RX ADMIN — HYDROCODONE BITARTRATE AND ACETAMINOPHEN 2 TABLET: 5; 325 TABLET ORAL at 21:11

## 2019-09-26 RX ADMIN — IPRATROPIUM BROMIDE AND ALBUTEROL SULFATE 3 ML: 2.5; .5 SOLUTION RESPIRATORY (INHALATION) at 23:01

## 2019-09-26 RX ADMIN — HYDROCODONE BITARTRATE AND ACETAMINOPHEN 2 TABLET: 5; 325 TABLET ORAL at 10:10

## 2019-09-26 RX ADMIN — BUDESONIDE 0.5 MG: 0.5 INHALANT RESPIRATORY (INHALATION) at 13:30

## 2019-09-26 RX ADMIN — BUDESONIDE 0.5 MG: 0.5 INHALANT RESPIRATORY (INHALATION) at 23:01

## 2019-09-26 RX ADMIN — FERROUS SULFATE TAB 325 MG (65 MG ELEMENTAL FE) 325 MG: 325 (65 FE) TAB at 09:22

## 2019-09-26 RX ADMIN — PANTOPRAZOLE SODIUM 40 MG: 40 TABLET, DELAYED RELEASE ORAL at 09:22

## 2019-09-26 RX ADMIN — FLUOXETINE HYDROCHLORIDE 20 MG: 20 CAPSULE ORAL at 09:22

## 2019-09-27 PROCEDURE — 94799 UNLISTED PULMONARY SVC/PX: CPT

## 2019-09-27 PROCEDURE — 97150 GROUP THERAPEUTIC PROCEDURES: CPT

## 2019-09-27 PROCEDURE — 97110 THERAPEUTIC EXERCISES: CPT

## 2019-09-27 RX ORDER — FUROSEMIDE 40 MG/1
40 TABLET ORAL DAILY PRN
Status: DISCONTINUED | OUTPATIENT
Start: 2019-09-28 | End: 2019-09-28 | Stop reason: HOSPADM

## 2019-09-27 RX ORDER — POTASSIUM CHLORIDE 750 MG/1
20 CAPSULE, EXTENDED RELEASE ORAL DAILY PRN
Status: DISCONTINUED | OUTPATIENT
Start: 2019-09-28 | End: 2019-09-28 | Stop reason: HOSPADM

## 2019-09-27 RX ADMIN — PANTOPRAZOLE SODIUM 40 MG: 40 TABLET, DELAYED RELEASE ORAL at 08:06

## 2019-09-27 RX ADMIN — IPRATROPIUM BROMIDE AND ALBUTEROL SULFATE 3 ML: 2.5; .5 SOLUTION RESPIRATORY (INHALATION) at 09:26

## 2019-09-27 RX ADMIN — FLUOXETINE HYDROCHLORIDE 20 MG: 20 CAPSULE ORAL at 08:06

## 2019-09-27 RX ADMIN — HYDROCODONE BITARTRATE AND ACETAMINOPHEN 2 TABLET: 5; 325 TABLET ORAL at 08:06

## 2019-09-27 RX ADMIN — METHYLPHENIDATE HYDROCHLORIDE 10 MG: 10 TABLET ORAL at 05:23

## 2019-09-27 RX ADMIN — ASPIRIN 325 MG: 325 TABLET, COATED ORAL at 08:06

## 2019-09-27 RX ADMIN — AZITHROMYCIN 250 MG: 250 TABLET, FILM COATED ORAL at 08:06

## 2019-09-27 RX ADMIN — DOCUSATE SODIUM 100 MG: 100 CAPSULE, LIQUID FILLED ORAL at 08:06

## 2019-09-27 RX ADMIN — HYDROCODONE BITARTRATE AND ACETAMINOPHEN 2 TABLET: 5; 325 TABLET ORAL at 18:12

## 2019-09-27 RX ADMIN — ROPINIROLE HYDROCHLORIDE 0.5 MG: 0.5 TABLET, FILM COATED ORAL at 21:06

## 2019-09-27 RX ADMIN — HYDROCODONE BITARTRATE AND ACETAMINOPHEN 2 TABLET: 5; 325 TABLET ORAL at 04:17

## 2019-09-27 RX ADMIN — GABAPENTIN 100 MG: 100 CAPSULE ORAL at 08:06

## 2019-09-27 RX ADMIN — HYDROCODONE BITARTRATE AND ACETAMINOPHEN 2 TABLET: 5; 325 TABLET ORAL at 12:26

## 2019-09-27 RX ADMIN — IPRATROPIUM BROMIDE AND ALBUTEROL SULFATE 3 ML: 2.5; .5 SOLUTION RESPIRATORY (INHALATION) at 20:15

## 2019-09-27 RX ADMIN — DOCUSATE SODIUM 100 MG: 100 CAPSULE, LIQUID FILLED ORAL at 21:05

## 2019-09-27 RX ADMIN — BUDESONIDE 0.5 MG: 0.5 INHALANT RESPIRATORY (INHALATION) at 09:26

## 2019-09-27 RX ADMIN — METHOCARBAMOL TABLETS 500 MG: 500 TABLET, COATED ORAL at 04:17

## 2019-09-27 RX ADMIN — FERROUS SULFATE TAB 325 MG (65 MG ELEMENTAL FE) 325 MG: 325 (65 FE) TAB at 08:06

## 2019-09-27 RX ADMIN — IPRATROPIUM BROMIDE AND ALBUTEROL SULFATE 3 ML: 2.5; .5 SOLUTION RESPIRATORY (INHALATION) at 15:21

## 2019-09-27 RX ADMIN — BUDESONIDE 0.5 MG: 0.5 INHALANT RESPIRATORY (INHALATION) at 20:15

## 2019-09-28 VITALS
OXYGEN SATURATION: 92 % | WEIGHT: 169.1 LBS | RESPIRATION RATE: 20 BRPM | HEIGHT: 71 IN | HEART RATE: 104 BPM | BODY MASS INDEX: 23.67 KG/M2 | TEMPERATURE: 96.9 F | SYSTOLIC BLOOD PRESSURE: 135 MMHG | DIASTOLIC BLOOD PRESSURE: 77 MMHG

## 2019-09-28 PROCEDURE — 97110 THERAPEUTIC EXERCISES: CPT

## 2019-09-28 PROCEDURE — 94799 UNLISTED PULMONARY SVC/PX: CPT

## 2019-09-28 PROCEDURE — 97150 GROUP THERAPEUTIC PROCEDURES: CPT

## 2019-09-28 RX ORDER — HYDROCODONE BITARTRATE AND ACETAMINOPHEN 5; 325 MG/1; MG/1
1-2 TABLET ORAL EVERY 4 HOURS PRN
Qty: 60 TABLET | Refills: 0 | Status: SHIPPED | OUTPATIENT
Start: 2019-09-28 | End: 2020-05-28 | Stop reason: DRUGHIGH

## 2019-09-28 RX ADMIN — HYDROCODONE BITARTRATE AND ACETAMINOPHEN 2 TABLET: 5; 325 TABLET ORAL at 11:50

## 2019-09-28 RX ADMIN — METHOCARBAMOL TABLETS 500 MG: 500 TABLET, COATED ORAL at 00:32

## 2019-09-28 RX ADMIN — IPRATROPIUM BROMIDE AND ALBUTEROL SULFATE 3 ML: 2.5; .5 SOLUTION RESPIRATORY (INHALATION) at 07:58

## 2019-09-28 RX ADMIN — METHYLPHENIDATE HYDROCHLORIDE 10 MG: 10 TABLET ORAL at 06:05

## 2019-09-28 RX ADMIN — DOCUSATE SODIUM 100 MG: 100 CAPSULE, LIQUID FILLED ORAL at 07:36

## 2019-09-28 RX ADMIN — HYDROCODONE BITARTRATE AND ACETAMINOPHEN 2 TABLET: 5; 325 TABLET ORAL at 01:30

## 2019-09-28 RX ADMIN — METHOCARBAMOL TABLETS 500 MG: 500 TABLET, COATED ORAL at 14:16

## 2019-09-28 RX ADMIN — FERROUS SULFATE TAB 325 MG (65 MG ELEMENTAL FE) 325 MG: 325 (65 FE) TAB at 07:36

## 2019-09-28 RX ADMIN — ASPIRIN 325 MG: 325 TABLET, COATED ORAL at 07:36

## 2019-09-28 RX ADMIN — FLUOXETINE HYDROCHLORIDE 20 MG: 20 CAPSULE ORAL at 07:36

## 2019-09-28 RX ADMIN — PANTOPRAZOLE SODIUM 40 MG: 40 TABLET, DELAYED RELEASE ORAL at 07:36

## 2019-09-28 RX ADMIN — METHOCARBAMOL TABLETS 500 MG: 500 TABLET, COATED ORAL at 08:28

## 2019-09-28 RX ADMIN — GABAPENTIN 100 MG: 100 CAPSULE ORAL at 07:36

## 2019-09-28 RX ADMIN — HYDROCODONE BITARTRATE AND ACETAMINOPHEN 2 TABLET: 5; 325 TABLET ORAL at 07:36

## 2019-09-28 RX ADMIN — BUDESONIDE 0.5 MG: 0.5 INHALANT RESPIRATORY (INHALATION) at 07:58

## 2019-09-29 ENCOUNTER — READMISSION MANAGEMENT (OUTPATIENT)
Dept: CALL CENTER | Facility: HOSPITAL | Age: 79
End: 2019-09-29

## 2019-09-29 NOTE — OUTREACH NOTE
Prep Survey      Responses   Facility patient discharged from?  [facility]   Is patient eligible?  No   What are the reasons patient is not eligible?  [facility]   Discharge diagnosis  right total hip arthroplasty   Does the patient have one of the following disease processes/diagnoses(primary or secondary)?  Total Joint Replacement   Prep survey completed?  Yes          Melly Wu RN

## 2019-09-30 ENCOUNTER — EPISODE CHANGES (OUTPATIENT)
Dept: CASE MANAGEMENT | Facility: OTHER | Age: 79
End: 2019-09-30

## 2019-10-01 ENCOUNTER — TELEPHONE (OUTPATIENT)
Dept: FAMILY MEDICINE CLINIC | Facility: CLINIC | Age: 79
End: 2019-10-01

## 2019-10-01 NOTE — TELEPHONE ENCOUNTER
NEEDS AN ORDER FOR PHYSICAL THERAPY SENT TO [facility] [place] [place] [address], [id number] FAX # [phone]

## 2019-10-01 NOTE — TELEPHONE ENCOUNTER
----- Message from Rowdy SWAIN MD sent at 5/13/2019  4:30 PM EDT -----  Regarding: Biopsy results  Okay to call results (adenomatous polyp) would advise office follow-up in 3 years to discuss risk-benefit ratio of further evaluation.  Patient can follow-up sooner as needed.  ----- Message -----  From: Lab, Background User  Sent: 5/13/2019   3:26 PM  To: Rowdy SWAIN MD      
Called pt and advised per Dr Mattson that the colon polyp he removed was not cancerous but was precancerous . He recommends to f/u in 3 yrs to discuss risk benefit ratio of further eval.  F/u sooner if needed. Pt verb understanding.     C/s placed in recall for 05/10/2022.  
01-Oct-2019 22:52

## 2019-10-02 DIAGNOSIS — M19.90 OSTEOARTHRITIS, UNSPECIFIED OSTEOARTHRITIS TYPE, UNSPECIFIED SITE: Primary | ICD-10-CM

## 2019-11-25 RX ORDER — METHYLPHENIDATE HYDROCHLORIDE 10 MG/1
10 TABLET ORAL DAILY
Qty: 90 TABLET | Refills: 0 | Status: SHIPPED | OUTPATIENT
Start: 2019-11-25 | End: 2020-03-06 | Stop reason: SDUPTHER

## 2019-12-20 ENCOUNTER — EPISODE CHANGES (OUTPATIENT)
Dept: CASE MANAGEMENT | Facility: OTHER | Age: 79
End: 2019-12-20

## 2019-12-28 ENCOUNTER — ANESTHESIA EVENT (OUTPATIENT)
Dept: GASTROENTEROLOGY | Facility: HOSPITAL | Age: 79
End: 2019-12-28

## 2019-12-28 ENCOUNTER — ANESTHESIA (OUTPATIENT)
Dept: GASTROENTEROLOGY | Facility: HOSPITAL | Age: 79
End: 2019-12-28

## 2019-12-28 ENCOUNTER — HOSPITAL ENCOUNTER (OUTPATIENT)
Facility: HOSPITAL | Age: 79
Discharge: HOME | End: 2019-12-29
Attending: INTERNAL MEDICINE | Admitting: INTERNAL MEDICINE

## 2019-12-28 DIAGNOSIS — J47.1 BRONCHIECTASIS WITH (ACUTE) EXACERBATION (HCC): ICD-10-CM

## 2019-12-28 LAB
B PARAPERT DNA SPEC QL NAA+PROBE: NOT DETECTED
B PERT DNA SPEC QL NAA+PROBE: NOT DETECTED
C PNEUM DNA NPH QL NAA+NON-PROBE: NOT DETECTED
FLUAV H1 2009 PAND RNA NPH QL NAA+PROBE: NOT DETECTED
FLUAV H1 HA GENE NPH QL NAA+PROBE: NOT DETECTED
FLUAV H3 RNA NPH QL NAA+PROBE: NOT DETECTED
FLUAV SUBTYP SPEC NAA+PROBE: NOT DETECTED
FLUBV RNA ISLT QL NAA+PROBE: NOT DETECTED
HADV DNA SPEC NAA+PROBE: NOT DETECTED
HCOV 229E RNA SPEC QL NAA+PROBE: NOT DETECTED
HCOV HKU1 RNA SPEC QL NAA+PROBE: NOT DETECTED
HCOV NL63 RNA SPEC QL NAA+PROBE: NOT DETECTED
HCOV OC43 RNA SPEC QL NAA+PROBE: NOT DETECTED
HMPV RNA NPH QL NAA+NON-PROBE: NOT DETECTED
HPIV1 RNA SPEC QL NAA+PROBE: NOT DETECTED
HPIV2 RNA SPEC QL NAA+PROBE: NOT DETECTED
HPIV3 RNA NPH QL NAA+PROBE: NOT DETECTED
HPIV4 P GENE NPH QL NAA+PROBE: NOT DETECTED
M PNEUMO IGG SER IA-ACNC: NOT DETECTED
PROCALCITONIN SERPL-MCNC: 0.09 NG/ML (ref 0.1–0.25)
RHINOVIRUS RNA SPEC NAA+PROBE: NOT DETECTED
RSV RNA NPH QL NAA+NON-PROBE: DETECTED

## 2019-12-28 PROCEDURE — 94799 UNLISTED PULMONARY SVC/PX: CPT

## 2019-12-28 PROCEDURE — A9270 NON-COVERED ITEM OR SERVICE: HCPCS | Performed by: INTERNAL MEDICINE

## 2019-12-28 PROCEDURE — 87070 CULTURE OTHR SPECIMN AEROBIC: CPT | Performed by: INTERNAL MEDICINE

## 2019-12-28 PROCEDURE — 84145 PROCALCITONIN (PCT): CPT | Performed by: INTERNAL MEDICINE

## 2019-12-28 PROCEDURE — 63710000001 ROPINIROLE 0.5 MG TABLET: Performed by: INTERNAL MEDICINE

## 2019-12-28 PROCEDURE — 63710000001 GABAPENTIN 100 MG CAPSULE: Performed by: INTERNAL MEDICINE

## 2019-12-28 PROCEDURE — 87205 SMEAR GRAM STAIN: CPT | Performed by: INTERNAL MEDICINE

## 2019-12-28 PROCEDURE — 63710000001 ACETAMINOPHEN 325 MG TABLET: Performed by: INTERNAL MEDICINE

## 2019-12-28 PROCEDURE — 25010000002 PROPOFOL 10 MG/ML EMULSION: Performed by: ANESTHESIOLOGY

## 2019-12-28 PROCEDURE — 94640 AIRWAY INHALATION TREATMENT: CPT

## 2019-12-28 PROCEDURE — G0378 HOSPITAL OBSERVATION PER HR: HCPCS

## 2019-12-28 PROCEDURE — 63710000001 ASPIRIN 325 MG TABLET: Performed by: INTERNAL MEDICINE

## 2019-12-28 PROCEDURE — 63710000001 PANTOPRAZOLE 40 MG TABLET DELAYED-RELEASE: Performed by: INTERNAL MEDICINE

## 2019-12-28 PROCEDURE — 0100U HC BIOFIRE FILMARRAY RESP PANEL 2: CPT | Performed by: INTERNAL MEDICINE

## 2019-12-28 RX ORDER — SODIUM CHLORIDE FOR INHALATION 7 %
4 VIAL, NEBULIZER (ML) INHALATION
Status: DISCONTINUED | OUTPATIENT
Start: 2019-12-28 | End: 2019-12-29 | Stop reason: HOSPADM

## 2019-12-28 RX ORDER — TIZANIDINE 4 MG/1
6 TABLET ORAL EVERY 8 HOURS PRN
Status: DISCONTINUED | OUTPATIENT
Start: 2019-12-28 | End: 2019-12-29 | Stop reason: HOSPADM

## 2019-12-28 RX ORDER — ASPIRIN 325 MG
325 TABLET ORAL DAILY
Status: DISCONTINUED | OUTPATIENT
Start: 2019-12-28 | End: 2019-12-29 | Stop reason: HOSPADM

## 2019-12-28 RX ORDER — PANTOPRAZOLE SODIUM 40 MG/1
40 TABLET, DELAYED RELEASE ORAL DAILY
Status: DISCONTINUED | OUTPATIENT
Start: 2019-12-28 | End: 2019-12-29 | Stop reason: HOSPADM

## 2019-12-28 RX ORDER — METHYLPHENIDATE HYDROCHLORIDE 10 MG/1
10 TABLET ORAL DAILY
Status: DISCONTINUED | OUTPATIENT
Start: 2019-12-28 | End: 2019-12-29 | Stop reason: HOSPADM

## 2019-12-28 RX ORDER — LIDOCAINE HYDROCHLORIDE 20 MG/ML
INJECTION, SOLUTION EPIDURAL; INFILTRATION; INTRACAUDAL; PERINEURAL AS NEEDED
Status: DISCONTINUED | OUTPATIENT
Start: 2019-12-28 | End: 2019-12-28 | Stop reason: HOSPADM

## 2019-12-28 RX ORDER — FLUOXETINE HYDROCHLORIDE 20 MG/1
20 CAPSULE ORAL DAILY
Status: DISCONTINUED | OUTPATIENT
Start: 2019-12-28 | End: 2019-12-29 | Stop reason: HOSPADM

## 2019-12-28 RX ORDER — BUDESONIDE 0.5 MG/2ML
0.5 INHALANT ORAL 2 TIMES DAILY
Status: DISCONTINUED | OUTPATIENT
Start: 2019-12-28 | End: 2019-12-29 | Stop reason: HOSPADM

## 2019-12-28 RX ORDER — SODIUM CHLORIDE 0.9 % (FLUSH) 0.9 %
3 SYRINGE (ML) INJECTION EVERY 12 HOURS SCHEDULED
Status: DISCONTINUED | OUTPATIENT
Start: 2019-12-28 | End: 2019-12-29 | Stop reason: HOSPADM

## 2019-12-28 RX ORDER — TRAMADOL HYDROCHLORIDE 50 MG/1
50 TABLET ORAL EVERY 6 HOURS PRN
Status: DISCONTINUED | OUTPATIENT
Start: 2019-12-28 | End: 2019-12-29 | Stop reason: HOSPADM

## 2019-12-28 RX ORDER — ACETAMINOPHEN 325 MG/1
650 TABLET ORAL EVERY 6 HOURS PRN
Status: DISCONTINUED | OUTPATIENT
Start: 2019-12-28 | End: 2019-12-29 | Stop reason: HOSPADM

## 2019-12-28 RX ORDER — SODIUM CHLORIDE 0.9 % (FLUSH) 0.9 %
10 SYRINGE (ML) INJECTION AS NEEDED
Status: DISCONTINUED | OUTPATIENT
Start: 2019-12-28 | End: 2019-12-29 | Stop reason: HOSPADM

## 2019-12-28 RX ORDER — GABAPENTIN 100 MG/1
100 CAPSULE ORAL EVERY EVENING
Status: DISCONTINUED | OUTPATIENT
Start: 2019-12-28 | End: 2019-12-29 | Stop reason: HOSPADM

## 2019-12-28 RX ORDER — AZITHROMYCIN 250 MG/1
250 TABLET, FILM COATED ORAL 3 TIMES WEEKLY
Status: DISCONTINUED | OUTPATIENT
Start: 2019-12-30 | End: 2019-12-29 | Stop reason: HOSPADM

## 2019-12-28 RX ORDER — ALBUTEROL SULFATE 2.5 MG/3ML
2.5 SOLUTION RESPIRATORY (INHALATION)
Status: DISCONTINUED | OUTPATIENT
Start: 2019-12-28 | End: 2019-12-29 | Stop reason: HOSPADM

## 2019-12-28 RX ORDER — PROPOFOL 10 MG/ML
VIAL (ML) INTRAVENOUS AS NEEDED
Status: DISCONTINUED | OUTPATIENT
Start: 2019-12-28 | End: 2019-12-28 | Stop reason: SURG

## 2019-12-28 RX ORDER — SODIUM CHLORIDE, SODIUM LACTATE, POTASSIUM CHLORIDE, CALCIUM CHLORIDE 600; 310; 30; 20 MG/100ML; MG/100ML; MG/100ML; MG/100ML
30 INJECTION, SOLUTION INTRAVENOUS CONTINUOUS PRN
Status: DISCONTINUED | OUTPATIENT
Start: 2019-12-28 | End: 2019-12-29 | Stop reason: HOSPADM

## 2019-12-28 RX ORDER — PROPOFOL 10 MG/ML
VIAL (ML) INTRAVENOUS CONTINUOUS PRN
Status: DISCONTINUED | OUTPATIENT
Start: 2019-12-28 | End: 2019-12-28 | Stop reason: SURG

## 2019-12-28 RX ORDER — LIDOCAINE HYDROCHLORIDE 10 MG/ML
INJECTION, SOLUTION EPIDURAL; INFILTRATION; INTRACAUDAL; PERINEURAL AS NEEDED
Status: DISCONTINUED | OUTPATIENT
Start: 2019-12-28 | End: 2019-12-28 | Stop reason: HOSPADM

## 2019-12-28 RX ORDER — ROPINIROLE 0.5 MG/1
0.5 TABLET, FILM COATED ORAL NIGHTLY
Status: DISCONTINUED | OUTPATIENT
Start: 2019-12-28 | End: 2019-12-29 | Stop reason: HOSPADM

## 2019-12-28 RX ADMIN — ACETAMINOPHEN 650 MG: 325 TABLET, FILM COATED ORAL at 20:26

## 2019-12-28 RX ADMIN — SODIUM CHLORIDE, POTASSIUM CHLORIDE, SODIUM LACTATE AND CALCIUM CHLORIDE 30 ML/HR: 600; 310; 30; 20 INJECTION, SOLUTION INTRAVENOUS at 09:55

## 2019-12-28 RX ADMIN — PANTOPRAZOLE SODIUM 40 MG: 40 TABLET, DELAYED RELEASE ORAL at 18:35

## 2019-12-28 RX ADMIN — ROPINIROLE HYDROCHLORIDE 0.5 MG: 0.5 TABLET, FILM COATED ORAL at 20:26

## 2019-12-28 RX ADMIN — PROPOFOL 50 MG: 10 INJECTION, EMULSION INTRAVENOUS at 11:46

## 2019-12-28 RX ADMIN — BUDESONIDE 0.5 MG: 0.5 INHALANT RESPIRATORY (INHALATION) at 21:48

## 2019-12-28 RX ADMIN — PROPOFOL 50 MG: 10 INJECTION, EMULSION INTRAVENOUS at 11:49

## 2019-12-28 RX ADMIN — PROPOFOL 300 MCG/KG/MIN: 10 INJECTION, EMULSION INTRAVENOUS at 11:44

## 2019-12-28 RX ADMIN — ASPIRIN 325 MG: 325 TABLET ORAL at 18:35

## 2019-12-28 RX ADMIN — GABAPENTIN 100 MG: 100 CAPSULE ORAL at 20:26

## 2019-12-28 RX ADMIN — ALBUTEROL SULFATE 2.5 MG: 2.5 SOLUTION RESPIRATORY (INHALATION) at 15:09

## 2019-12-28 RX ADMIN — ALBUTEROL SULFATE 2.5 MG: 2.5 SOLUTION RESPIRATORY (INHALATION) at 12:19

## 2019-12-28 RX ADMIN — SODIUM CHLORIDE, PRESERVATIVE FREE 3 ML: 5 INJECTION INTRAVENOUS at 22:53

## 2019-12-28 RX ADMIN — SODIUM CHLORIDE 4 ML: 7 NEBU SOLN,3 % NEBU at 21:48

## 2019-12-28 RX ADMIN — ALBUTEROL SULFATE 2.5 MG: 2.5 SOLUTION RESPIRATORY (INHALATION) at 21:48

## 2019-12-28 NOTE — ANESTHESIA PREPROCEDURE EVALUATION
Anesthesia Evaluation     Patient summary reviewed and Nursing notes reviewed                Airway   Mallampati: I  TM distance: >3 FB  Neck ROM: full  No difficulty expected  Dental - normal exam     Pulmonary    (+) pneumonia , COPD, rhonchi, wheezes,   Cardiovascular - normal exam    (+) CHF ,       Neuro/Psych  (+) numbness, psychiatric history,     GI/Hepatic/Renal/Endo    (+)  GERD, GI bleeding ,     Musculoskeletal     Abdominal  - normal exam    Bowel sounds: normal.   Substance History - negative use     OB/GYN negative ob/gyn ROS         Other   arthritis,    history of cancer                    Anesthesia Plan    ASA 4     MAC       Anesthetic plan, all risks, benefits, and alternatives have been provided, discussed and informed consent has been obtained with: patient.

## 2019-12-28 NOTE — ANESTHESIA POSTPROCEDURE EVALUATION
"Patient: Tony Leonard    Procedure Summary     Date:  12/28/19 Room / Location:   [facility]    Anesthesia Start:  1140 Anesthesia Stop:  1214    Procedure:  BRONCHOSCOPY with bilateral washing (N/A Bronchus) Diagnosis:      Surgeon:  Katharina Salter MD Provider:  Jered Jameson MD    Anesthesia Type:  MAC ASA Status:  4          Anesthesia Type: MAC    Vitals  Vitals Value Taken Time   /69 12/28/2019 12:28 PM   Temp     Pulse 103 12/28/2019 12:28 PM   Resp 22 12/28/2019 12:28 PM   SpO2 95 % 12/28/2019 12:28 PM           Post Anesthesia Care and Evaluation    Patient location during evaluation: PACU  Patient participation: complete - patient participated  Level of consciousness: awake  Pain score: 0  Pain management: adequate  Airway patency: patent  Anesthetic complications: No anesthetic complications  PONV Status: none  Cardiovascular status: acceptable  Respiratory status: acceptable and nasal cannula  Hydration status: acceptable    Comments: /69 (BP Location: Left arm, Patient Position: Lying)   Pulse 103   Temp 37.4 °C (99.3 °F) (Oral)   Resp 22   Ht 180.3 cm (71\")   Wt 80.2 kg (176 lb 14.4 oz)   SpO2 95%   BMI 24.67 kg/m²       "

## 2019-12-29 VITALS
HEART RATE: 91 BPM | BODY MASS INDEX: 24.77 KG/M2 | SYSTOLIC BLOOD PRESSURE: 130 MMHG | DIASTOLIC BLOOD PRESSURE: 58 MMHG | RESPIRATION RATE: 20 BRPM | WEIGHT: 176.9 LBS | TEMPERATURE: 100.4 F | HEIGHT: 71 IN | OXYGEN SATURATION: 95 %

## 2019-12-29 PROBLEM — J20.8 ACUTE VIRAL BRONCHITIS: Status: ACTIVE | Noted: 2019-12-29

## 2019-12-29 PROCEDURE — 63710000001 PANTOPRAZOLE 40 MG TABLET DELAYED-RELEASE: Performed by: INTERNAL MEDICINE

## 2019-12-29 PROCEDURE — G0378 HOSPITAL OBSERVATION PER HR: HCPCS

## 2019-12-29 PROCEDURE — 94799 UNLISTED PULMONARY SVC/PX: CPT

## 2019-12-29 PROCEDURE — A9270 NON-COVERED ITEM OR SERVICE: HCPCS | Performed by: INTERNAL MEDICINE

## 2019-12-29 PROCEDURE — 63710000001 METHYLPHENIDATE 10 MG TABLET: Performed by: INTERNAL MEDICINE

## 2019-12-29 PROCEDURE — 63710000001 ASPIRIN 325 MG TABLET: Performed by: INTERNAL MEDICINE

## 2019-12-29 RX ORDER — PREDNISONE 20 MG/1
20 TABLET ORAL 2 TIMES DAILY
Qty: 10 TABLET | Refills: 0 | Status: SHIPPED | OUTPATIENT
Start: 2019-12-29 | End: 2020-01-03

## 2019-12-29 RX ADMIN — ALBUTEROL SULFATE 2.5 MG: 2.5 SOLUTION RESPIRATORY (INHALATION) at 06:41

## 2019-12-29 RX ADMIN — SODIUM CHLORIDE 4 ML: 7 NEBU SOLN,3 % NEBU at 06:42

## 2019-12-29 RX ADMIN — SODIUM CHLORIDE, PRESERVATIVE FREE 3 ML: 5 INJECTION INTRAVENOUS at 08:30

## 2019-12-29 RX ADMIN — ASPIRIN 325 MG: 325 TABLET ORAL at 08:29

## 2019-12-29 RX ADMIN — METHYLPHENIDATE HYDROCHLORIDE 10 MG: 10 TABLET ORAL at 08:29

## 2019-12-29 RX ADMIN — BUDESONIDE 0.5 MG: 0.5 INHALANT RESPIRATORY (INHALATION) at 06:41

## 2019-12-29 RX ADMIN — PANTOPRAZOLE SODIUM 40 MG: 40 TABLET, DELAYED RELEASE ORAL at 08:30

## 2019-12-30 LAB
BACTERIA SPEC RESP CULT: NORMAL
GRAM STN SPEC: NORMAL
GRAM STN SPEC: NORMAL

## 2020-01-22 ENCOUNTER — OFFICE VISIT (OUTPATIENT)
Dept: FAMILY MEDICINE CLINIC | Facility: CLINIC | Age: 80
End: 2020-01-22

## 2020-01-22 VITALS
BODY MASS INDEX: 24.47 KG/M2 | WEIGHT: 174.8 LBS | TEMPERATURE: 97.6 F | HEIGHT: 71 IN | DIASTOLIC BLOOD PRESSURE: 60 MMHG | HEART RATE: 72 BPM | OXYGEN SATURATION: 98 % | SYSTOLIC BLOOD PRESSURE: 138 MMHG

## 2020-01-22 DIAGNOSIS — I50.32 CHRONIC DIASTOLIC CHF (CONGESTIVE HEART FAILURE) (HCC): ICD-10-CM

## 2020-01-22 DIAGNOSIS — M15.9 PRIMARY OSTEOARTHRITIS INVOLVING MULTIPLE JOINTS: ICD-10-CM

## 2020-01-22 DIAGNOSIS — J47.0 BRONCHIECTASIS WITH ACUTE LOWER RESPIRATORY INFECTION (HCC): Primary | ICD-10-CM

## 2020-01-22 PROBLEM — J47.1 BRONCHIECTASIS WITH (ACUTE) EXACERBATION: Status: RESOLVED | Noted: 2019-12-28 | Resolved: 2020-01-22

## 2020-01-22 PROBLEM — M16.9 DEGENERATIVE JOINT DISEASE (DJD) OF HIP: Status: RESOLVED | Noted: 2019-09-23 | Resolved: 2020-01-22

## 2020-01-22 PROBLEM — J44.1 COPD EXACERBATION (HCC): Status: RESOLVED | Noted: 2018-04-16 | Resolved: 2020-01-22

## 2020-01-22 PROCEDURE — 99214 OFFICE O/P EST MOD 30 MIN: CPT | Performed by: INTERNAL MEDICINE

## 2020-01-22 RX ORDER — GABAPENTIN 100 MG/1
100 CAPSULE ORAL EVERY EVENING
Qty: 30 CAPSULE | Refills: 3 | Status: SHIPPED | OUTPATIENT
Start: 2020-01-22 | End: 2020-05-28

## 2020-01-22 RX ORDER — PANTOPRAZOLE SODIUM 40 MG/1
40 TABLET, DELAYED RELEASE ORAL DAILY
Qty: 30 TABLET | Refills: 1 | Status: SHIPPED | OUTPATIENT
Start: 2020-01-22 | End: 2020-02-03 | Stop reason: SDUPTHER

## 2020-01-22 NOTE — PROGRESS NOTES
Subjective   Tony Leonard is a 79 y.o. male.     History of Present Illness   Current outpatient and discharge medications have been reconciled for the patient.  Reviewed by: Erich Aviles MD  Patient was seen for hospital admission at [facility] for bronchiectasis.  Patient had fluid drained off his lung and was discharged home.  Patient was stable at present time was not short of breath.  Patient has a history of congestive heart failure blood was stable at present time.  Patient has not had a big weight gain or PND orthopnea.  Patient's osteoarthritis has been controlled with over-the-counter medications.  Patient also uses CBD oil.    Dictated utilizing Dragon dictation. If there are questions or for further clarification, please contact me.  The following portions of the patient's history were reviewed and updated as appropriate: allergies, current medications, past family history, past medical history, past social history, past surgical history and problem list.    Review of Systems   Constitutional: Negative for fatigue and fever.   HENT: Positive for congestion. Negative for trouble swallowing.    Eyes: Negative for discharge and visual disturbance.   Respiratory: Negative for apnea, cough, choking, chest tightness, shortness of breath, wheezing and stridor.    Cardiovascular: Negative for chest pain, palpitations and leg swelling.   Gastrointestinal: Negative for abdominal pain and blood in stool.   Endocrine: Negative.    Genitourinary: Negative for genital sores and hematuria.   Musculoskeletal: Negative for gait problem and joint swelling.   Skin: Negative for color change, pallor, rash and wound.   Allergic/Immunologic: Positive for environmental allergies. Negative for immunocompromised state.   Neurological: Negative for facial asymmetry and speech difficulty.   Psychiatric/Behavioral: Negative for hallucinations and suicidal ideas.       Objective   Physical Exam   Constitutional: He is  oriented to person, place, and time. He appears well-developed and well-nourished.   HENT:   Head: Normocephalic.   Eyes: Pupils are equal, round, and reactive to light. Conjunctivae are normal.   Neck: Normal range of motion. Neck supple.   Cardiovascular: Normal rate, regular rhythm and normal heart sounds. Exam reveals no gallop and no friction rub.   No murmur heard.  Pulmonary/Chest: Effort normal and breath sounds normal. No stridor. No respiratory distress. He has no wheezes. He has no rales. He exhibits no tenderness.   Abdominal: Soft. Bowel sounds are normal.   Musculoskeletal: Normal range of motion.   Neurological: He is alert and oriented to person, place, and time.   Skin: Skin is warm and dry.   Psychiatric: He has a normal mood and affect. His behavior is normal. Judgment and thought content normal.   Nursing note and vitals reviewed.      Assessment/Plan #1 monitor weight and blood pressure #2 low impact exercise #3 continue present pharmacologic dilator treatment  Tony was seen today for heartburn and abdominal pain.    Diagnoses and all orders for this visit:    Bronchiectasis with acute lower respiratory infection (CMS/HCC)    Chronic diastolic CHF (congestive heart failure) (CMS/[facility])    Primary osteoarthritis involving multiple joints    Other orders  -     pantoprazole (PROTONIX) 40 MG EC tablet; Take 1 tablet by mouth Daily.  -     gabapentin (NEURONTIN) 100 MG capsule; Take 1 capsule by mouth Every Evening.

## 2020-02-03 ENCOUNTER — OFFICE VISIT (OUTPATIENT)
Dept: GASTROENTEROLOGY | Facility: CLINIC | Age: 80
End: 2020-02-03

## 2020-02-03 VITALS
TEMPERATURE: 97.5 F | HEIGHT: 71 IN | SYSTOLIC BLOOD PRESSURE: 110 MMHG | BODY MASS INDEX: 24.53 KG/M2 | DIASTOLIC BLOOD PRESSURE: 70 MMHG | WEIGHT: 175.2 LBS

## 2020-02-03 DIAGNOSIS — R10.84 GENERALIZED ABDOMINAL PAIN: Primary | ICD-10-CM

## 2020-02-03 DIAGNOSIS — R05.9 COUGH: ICD-10-CM

## 2020-02-03 DIAGNOSIS — K21.9 GASTROESOPHAGEAL REFLUX DISEASE, ESOPHAGITIS PRESENCE NOT SPECIFIED: ICD-10-CM

## 2020-02-03 DIAGNOSIS — C61 PROSTATE CANCER (HCC): ICD-10-CM

## 2020-02-03 PROCEDURE — 99214 OFFICE O/P EST MOD 30 MIN: CPT | Performed by: NURSE PRACTITIONER

## 2020-02-03 RX ORDER — CALCIUM CARBONATE 200(500)MG
1 TABLET,CHEWABLE ORAL AS NEEDED
COMMUNITY

## 2020-02-03 RX ORDER — PANTOPRAZOLE SODIUM 40 MG/1
40 TABLET, DELAYED RELEASE ORAL DAILY
Qty: 90 TABLET | Refills: 3 | Status: SHIPPED | OUTPATIENT
Start: 2020-02-03 | End: 2020-03-06 | Stop reason: SDUPTHER

## 2020-02-03 NOTE — PROGRESS NOTES
Chief Complaint   Patient presents with   • Abdominal Pain   • Heartburn       Tony Leonard is a  79 y.o. male here for a follow up visit for GERD.    HPI  79-year-old male presents today for follow-up visit for GERD.  He is a patient of Dr. Mattson.  He was last seen in the office on 3/7/2019.  He reports for the last several weeks he has been having worsening reflux symptoms including a cough.  He is also been having some generalized abdominal pain but does not seem to be better whether he has a bowel movement or not.  He tells me the pain somewhat better today but still there.  He is also been having some epigastric pain with his reflux.  He used to be on Protonix 40 mg once daily but he cannot recall when he quit taking it.  His last colonoscopy was done on 5/2019.  He does have a history of adenomatous colon polyps as well as diverticulosis.  He denies any dysphagia, nausea and vomiting, diarrhea, constipation, rectal bleeding or melena.  He admits his appetite is decreased but his weight is stable.  He does have a history of prostate cancer.  He also has a history of COPD.  Past Medical History:   Diagnosis Date   • ADHD (attention deficit hyperactivity disorder)    • Bronchiectasis (CMS/HCC)    • Colon polyp    • COPD (chronic obstructive pulmonary disease) (CMS/HCC)    • Diverticulosis    • Hard of hearing    • On home oxygen therapy     2 LITERS   • Pneumonia    • Prostate cancer (CMS/HCC) 4/22/2019       Past Surgical History:   Procedure Laterality Date   • BRONCHOSCOPY N/A 4/30/2016    Procedure: BRONCHOSCOPY with BAL of right lower lobe and left  lower lobe.  ;  Surgeon: Nando Diaz MD;  Location: [facility] ENDOSCOPY;  Service:    • BRONCHOSCOPY N/A 4/28/2017    Procedure: BRONCHOSCOPY;  Surgeon: Katharina Salter MD;  Location: [facility] ENDOSCOPY;  Service:    • BRONCHOSCOPY Bilateral 6/29/2017    Procedure: BRONCHOSCOPY WITH WASHINGS;  Surgeon: Katharina Salter MD;  Location: [facility] ENDOSCOPY;   Service:    • BRONCHOSCOPY N/A 1/22/2018    Procedure: BRONCHOSCOPY AT BEDSIDE with BAL;  Surgeon: Katharina Salter MD;  Location: [facility] ENDOSCOPY;  Service:    • BRONCHOSCOPY N/A 1/30/2018    Procedure: BRONCHOSCOPY with BAL and washing;  Surgeon: Shreyas Wild MD;  Location: [facility] ENDOSCOPY;  Service:    • BRONCHOSCOPY Bilateral 4/24/2018    Procedure: BRONCHOSCOPY WITH WASHING;  Surgeon: Katharina Salter MD;  Location: [facility] ENDOSCOPY;  Service: Pulmonary   • BRONCHOSCOPY N/A 12/28/2019    Procedure: BRONCHOSCOPY with bilateral washing;  Surgeon: Katharina Salter MD;  Location: [facility] ENDOSCOPY;  Service: Pulmonary   • COLONOSCOPY  05/17/2013    eh, ih, tort, sig tics   • COLONOSCOPY N/A 5/10/2019    Non-thrombosed external hemorrhoids found on perianal exam, Diverticulosis, Tortuous colon, One 5 mm polyp in the mid ascending colon, IH. Path: Tubular adenoma.    • ENDOSCOPY  03/19/2015    z line irreg, hh   • HIP OPEN REDUCTION Right 1/17/2018    Procedure: HIP OPEN REDUCTION INTERNAL FIXATION WITH DYNAMIC HIP SCREW;  Surgeon: Les Black MD;  Location: [facility] OR;  Service:    • SPINE SURGERY     • TONSILLECTOMY     • TOTAL HIP ARTHROPLASTY REVISION Right 9/23/2019    Procedure: HIP  REVISION RIGHT;  Surgeon: Isauro Warner MD;  Location: [facility] OR;  Service: Orthopedics       Scheduled Meds:    Continuous Infusions:  No current facility-administered medications for this visit.     PRN Meds:.    Allergies   Allergen Reactions   • Daliresp [Roflumilast] Anaphylaxis   • Latex Rash   • Sulfa Antibiotics Rash       Social History     Socioeconomic History   • Marital status: Single     Spouse name: Not on file   • Number of children: Not on file   • Years of education: Not on file   • Highest education level: Not on file   Tobacco Use   • Smoking status: Never Smoker   • Smokeless tobacco: Never Used   Substance and Sexual Activity   • Alcohol use: No     Comment: red wine occassional   • Drug use: No    • Sexual activity: Defer       Family History   Problem Relation Age of Onset   • Thyroid disease Mother    • No Known Problems Father    • Malig Hyperthermia Neg Hx        Review of Systems   Constitutional: Negative for appetite change, chills, diaphoresis, fatigue, fever and unexpected weight change.   HENT: Negative for nosebleeds, postnasal drip, sore throat, trouble swallowing and voice change.    Respiratory: Positive for cough. Negative for choking, chest tightness, shortness of breath and wheezing.    Cardiovascular: Negative for chest pain, palpitations and leg swelling.   Gastrointestinal: Positive for abdominal distention and abdominal pain. Negative for anal bleeding, blood in stool, constipation, diarrhea, nausea, rectal pain and vomiting.   Endocrine: Negative for polydipsia, polyphagia and polyuria.   Musculoskeletal: Negative for gait problem.   Skin: Negative for rash and wound.   Allergic/Immunologic: Negative for food allergies.   Neurological: Negative for dizziness, speech difficulty and light-headedness.   Psychiatric/Behavioral: Negative for confusion, self-injury, sleep disturbance and suicidal ideas.       Vitals:    02/03/20 0956   BP: 110/70   Temp: 97.5 °F (36.4 °C)       Physical Exam   Constitutional: He is oriented to person, place, and time. He appears well-developed and well-nourished. He does not appear ill. No distress.   HENT:   Head: Normocephalic.   Eyes: Pupils are equal, round, and reactive to light.   Cardiovascular: Normal rate, regular rhythm and normal heart sounds.   Pulmonary/Chest: Effort normal and breath sounds normal.   Abdominal: Soft. Bowel sounds are normal. He exhibits distension. He exhibits no mass. There is no hepatosplenomegaly. There is tenderness. There is no rebound and no guarding. No hernia.       Musculoskeletal: Normal range of motion.   Neurological: He is alert and oriented to person, place, and time.   Skin: Skin is warm and dry.   Psychiatric:  He has a normal mood and affect. His speech is normal and behavior is normal. Judgment normal.       No radiology results for the last 7 days     Assessment and plan     1. Generalized abdominal pain  - CT Abdomen Pelvis With Contrast; Future    2. Gastroesophageal reflux disease, esophagitis presence not specified  - CT Abdomen Pelvis With Contrast; Future    3. Cough  - CT Abdomen Pelvis With Contrast; Future    4. Prostate cancer (CMS/HCC)  - CT Abdomen Pelvis With Contrast; Future    Not sure at this time what is causing his generalized abdominal pain.  His bowels are definitely not regular.  I recommended daily fiber supplement.  Given his history and current symptoms recommend a CT scan of the abdomen and pelvis for further evaluation.  GERD is not well controlled at this time.  For some reason he quit taking the Protonix and he cannot remember why.  We will go ahead and get him back on Protonix 40 mg once daily.  Continue GERD precautions.  Patient definitely needs to increase his water intake.  Patient to call the office next week with an update.  Patient to follow-up with me or Dr. Mattson in 1 month.

## 2020-02-20 ENCOUNTER — HOSPITAL ENCOUNTER (OUTPATIENT)
Dept: CT IMAGING | Facility: HOSPITAL | Age: 80
Discharge: HOME | End: 2020-02-20
Admitting: NURSE PRACTITIONER

## 2020-02-20 DIAGNOSIS — R05.9 COUGH: ICD-10-CM

## 2020-02-20 DIAGNOSIS — K21.9 GASTROESOPHAGEAL REFLUX DISEASE, ESOPHAGITIS PRESENCE NOT SPECIFIED: ICD-10-CM

## 2020-02-20 DIAGNOSIS — C61 PROSTATE CANCER (HCC): ICD-10-CM

## 2020-02-20 DIAGNOSIS — R10.84 GENERALIZED ABDOMINAL PAIN: ICD-10-CM

## 2020-02-20 LAB — CREAT BLDA-MCNC: 1 MG/DL (ref 0.6–1.3)

## 2020-02-20 PROCEDURE — 74177 CT ABD & PELVIS W/CONTRAST: CPT

## 2020-02-20 PROCEDURE — 0 DIATRIZOATE MEGLUMINE & SODIUM PER 1 ML: Performed by: NURSE PRACTITIONER

## 2020-02-20 PROCEDURE — 25010000002 IOPAMIDOL 61 % SOLUTION: Performed by: NURSE PRACTITIONER

## 2020-02-20 PROCEDURE — 82565 ASSAY OF CREATININE: CPT

## 2020-02-20 RX ADMIN — DIATRIZOATE MEGLUMINE AND DIATRIZOATE SODIUM 30 ML: 600; 100 SOLUTION ORAL; RECTAL at 08:26

## 2020-02-20 RX ADMIN — IOPAMIDOL 85 ML: 612 INJECTION, SOLUTION INTRAVENOUS at 09:28

## 2020-02-21 ENCOUNTER — TELEPHONE (OUTPATIENT)
Dept: GASTROENTEROLOGY | Facility: CLINIC | Age: 80
End: 2020-02-21

## 2020-02-21 NOTE — TELEPHONE ENCOUNTER
Please call him and make sure he is drinking lots of fluids to help with that diarrhea.  That should definitely start to ease up today and tomorrow.  Have him call us back Monday if he is not any better.

## 2020-02-21 NOTE — TELEPHONE ENCOUNTER
"Call to pt.  Advise per AUREA Carrera note.  Verb understanding  States red dye from CT \"gave me a lot of diarrhea\".  Feels weak today. Requests also send CT results to Dr Katharina Salter - did so via [employer].  Message to AUREA Carrera.     Update to Dr Erich Aviles.    "

## 2020-02-21 NOTE — TELEPHONE ENCOUNTER
----- Message from PABLITO Maldonado sent at 2/21/2020  9:06 AM EST -----  FINDINGS:  There is extensive punctate nodularity in the right lower  lobe with adjacent small irregular opacities which could be related to  pneumonia in the proper clinical setting. Subcentimeter lower pole left  renal lesion on image 65 likely reflects tiny cysts but is too small for  detailed assessment. Remaining solid organs have an unremarkable  appearance. Encompassed aorta is non-aneurysmal. The appendix is unable  to be identified for assessment.  There are however no inflammatory  changes in the expected region of the appendix. Moderate diverticulosis  with mild constipation but no obstruction or acute GI tract  inflammation. Artifact from right hip arthroplasty obscures the mid and  lower pelvis. No large pelvic mass or large pelvic fluid collection  demonstrated. There are prostate gland seed implants. There is a small  fat containing umbilical hernia without associated inflammation.     IMPRESSION :   1. Right lower lobe findings as discussed. Recommend follow-up with  thoracic imaging.  2. Tiny left renal lesion, likely cyst.  3. Diverticulosis, constipation, no obstruction or acute inflammation.    Please call the patient and review his CT scan results with him.  As far as the lung findings go he really needs to show that to his primary care provider if this is new findings.  Otherwise it looks like he has some constipation but no acute abdominal process has been identified.  How is he doing?

## 2020-02-26 ENCOUNTER — TRANSCRIBE ORDERS (OUTPATIENT)
Dept: ADMINISTRATIVE | Facility: HOSPITAL | Age: 80
End: 2020-02-26

## 2020-02-26 DIAGNOSIS — R91.8 PULMONARY INFILTRATE: Primary | ICD-10-CM

## 2020-02-27 ENCOUNTER — TELEPHONE (OUTPATIENT)
Dept: GASTROENTEROLOGY | Facility: CLINIC | Age: 80
End: 2020-02-27

## 2020-02-27 NOTE — TELEPHONE ENCOUNTER
Call to pt.  Questions per M Debi.     States took protonix yesterday.  BM consistent normal - passing more stool than usual.  Cough normal for him - h/o copd.  States reflux symptom has resolved.    Advise pt will update M Debi.  In the meantime, continue to sip fluids, rest - follow prior recommendations.  Verb  Understanding.

## 2020-02-27 NOTE — TELEPHONE ENCOUNTER
"Call to pt.  States yesterday took pill - then had N&V x1.  Clear liquid - \"like the water I drank\".      No emesis today, but does continue to feel sick to stomach.  Eating small amounts successfully      Advise pt will send message to AUREA Carrera, but in the meantime - sip at fluids to stay hydrated.  If becomes weak/dizzy - go to ER.   Verb understanding.   "

## 2020-02-27 NOTE — TELEPHONE ENCOUNTER
What pill is he referring too? Protonix or fiber? How are his bowels moving? CT showed constipation. How is his cough and reflux?

## 2020-02-27 NOTE — TELEPHONE ENCOUNTER
----- Message from Merary Lerma sent at 2/27/2020  3:48 PM EST -----  Regarding: call back, symptoms  Contact: [phone]  Pt is calling regarding symptoms has been vomiting want to know what to do.

## 2020-03-06 ENCOUNTER — OFFICE VISIT (OUTPATIENT)
Dept: FAMILY MEDICINE CLINIC | Facility: CLINIC | Age: 80
End: 2020-03-06

## 2020-03-06 VITALS
HEIGHT: 71 IN | TEMPERATURE: 97.7 F | DIASTOLIC BLOOD PRESSURE: 64 MMHG | HEART RATE: 86 BPM | WEIGHT: 175.6 LBS | SYSTOLIC BLOOD PRESSURE: 120 MMHG | OXYGEN SATURATION: 98 % | BODY MASS INDEX: 24.58 KG/M2

## 2020-03-06 DIAGNOSIS — Z79.891 OPIOID USE AGREEMENT EXISTS: ICD-10-CM

## 2020-03-06 DIAGNOSIS — I50.32 CHRONIC DIASTOLIC CHF (CONGESTIVE HEART FAILURE) (HCC): ICD-10-CM

## 2020-03-06 DIAGNOSIS — J41.0 SIMPLE CHRONIC BRONCHITIS (HCC): ICD-10-CM

## 2020-03-06 DIAGNOSIS — M15.9 PRIMARY OSTEOARTHRITIS INVOLVING MULTIPLE JOINTS: ICD-10-CM

## 2020-03-06 DIAGNOSIS — Z00.00 MEDICARE ANNUAL WELLNESS VISIT, SUBSEQUENT: Primary | ICD-10-CM

## 2020-03-06 PROCEDURE — G0439 PPPS, SUBSEQ VISIT: HCPCS | Performed by: INTERNAL MEDICINE

## 2020-03-06 PROCEDURE — 99214 OFFICE O/P EST MOD 30 MIN: CPT | Performed by: INTERNAL MEDICINE

## 2020-03-06 RX ORDER — PANTOPRAZOLE SODIUM 40 MG/1
40 TABLET, DELAYED RELEASE ORAL DAILY
Qty: 90 TABLET | Refills: 3 | Status: SHIPPED | OUTPATIENT
Start: 2020-03-06 | End: 2020-05-28 | Stop reason: SDUPTHER

## 2020-03-06 RX ORDER — TIZANIDINE HYDROCHLORIDE 6 MG/1
6 CAPSULE, GELATIN COATED ORAL NIGHTLY
Qty: 90 CAPSULE | Refills: 3 | Status: SHIPPED | OUTPATIENT
Start: 2020-03-06 | End: 2020-11-18 | Stop reason: SDUPTHER

## 2020-03-06 RX ORDER — METHYLPHENIDATE HYDROCHLORIDE 10 MG/1
10 TABLET ORAL DAILY
Qty: 90 TABLET | Refills: 0 | Status: SHIPPED | OUTPATIENT
Start: 2020-03-06 | End: 2020-04-20 | Stop reason: SDUPTHER

## 2020-03-06 NOTE — PROGRESS NOTES
Subsequent Medicare Wellness Visit   The ABC's of the Annual Wellness Visit    Chief Complaint   Patient presents with   • ritalin refill   • throat swollen after surgery   • uds/contract       HPI:  Tony Leonard, -1940, is a 79 y.o. male who presents for a Subsequent Medicare Wellness Visit.  Patient was seen for Medicare wellness exam.  Patient was seen for osteoarthritis.  Patient does have severe osteoarthritis with pain medication.The patient has read and signed the [facility] Controlled Substance Contract.  I will continue to see patient for regular follow up appointments.  They are well controlled on their medication.  NEY has been reviewed by me and is updated every 3 months. The patient is aware of the potential for addiction and dependence.      Patient does have a history of bronchitis relieved with his bronchodilators.  Patient does have congestive heart failure controlled with medication.  Patient does see a cardiologist on a regular basis.  Recent Hospitalizations:  Recently treated at the following:  Other: [facility].    Current Medical Providers:  Patient Care Team:  Erich Aviles MD as PCP - General (Internal Medicine)  Lennox Castro Jr., MD as PCP - Claims Attributed  Katharina Salter MD as Consulting Physician (Pulmonary Disease)    Health Habits and Functional and Cognitive Screening and Depression Screening:  Functional & Cognitive Status 3/6/2020   Do you have difficulty preparing food and eating? No   Do you have difficulty bathing yourself, getting dressed or grooming yourself? No   Do you have difficulty using the toilet? No   Do you have difficulty moving around from place to place? No   Do you have trouble with steps or getting out of a bed or a chair? No   Current Diet Well Balanced Diet   Dental Exam Up to date   Eye Exam Up to date   Exercise (times per week) 7 times per week   Current Exercise Activities Include Aerobics   Do you need help using the phone?  No    Are you deaf or do you have serious difficulty hearing?  No   Do you need help with transportation? No   Do you need help shopping? No   Do you need help preparing meals?  No   Do you need help with housework?  No   Do you need help with laundry? No   Do you need help taking your medications? No   Do you need help managing money? No   Do you ever drive or ride in a car without wearing a seat belt? No   Have you felt unusual stress, anger or loneliness in the last month? No   Who do you live with? Alone   If you need help, do you have trouble finding someone available to you? No   Have you been bothered in the last four weeks by sexual problems? No   Do you have difficulty concentrating, remembering or making decisions? No       Compared to one year ago, the patient feels his physical health is worse and his mental health is the same.    Depression Screen:  PHQ-2/PHQ-9 Depression Screening 3/6/2020   Little interest or pleasure in doing things 0   Feeling down, depressed, or hopeless 0   Trouble falling or staying asleep, or sleeping too much 0   Feeling tired or having little energy 0   Poor appetite or overeating 0   Feeling bad about yourself - or that you are a failure or have let yourself or your family down 0   Trouble concentrating on things, such as reading the newspaper or watching television 0   Moving or speaking so slowly that other people could have noticed. Or the opposite - being so fidgety or restless that you have been moving around a lot more than usual 0   Thoughts that you would be better off dead, or of hurting yourself in some way 0   Total Score 0   If you checked off any problems, how difficult have these problems made it for you to do your work, take care of things at home, or get along with other people? Not difficult at all         Past Medical/Family/Social History:  The following portions of the patient's history were reviewed and updated as appropriate: allergies, current medications,  past family history, past medical history, past social history, past surgical history and problem list.    Allergies   Allergen Reactions   • Daliresp [Roflumilast] Anaphylaxis   • Latex Rash   • Sulfa Antibiotics Rash         Current Outpatient Medications:   •  albuterol (PROVENTIL) (2.5 MG/3ML) 0.083% nebulizer solution, Take 2.5 mg by nebulization 4 (Four) Times a Day. (Patient taking differently: Take 2.5 mg by nebulization 2 (Two) Times a Day.), Disp: 360 mL, Rfl: 3  •  budesonide (PULMICORT) 0.5 MG/2ML nebulizer solution, 1 vial only twice daily (Patient taking differently: Take  by nebulization 2 (Two) Times a Day. 1 vial only twice daily), Disp: 180 each, Rfl: 3  •  calcium carbonate (TUMS) 500 MG chewable tablet, Chew 1 tablet As Needed for Indigestion or Heartburn., Disp: , Rfl:   •  gabapentin (NEURONTIN) 100 MG capsule, Take 1 capsule by mouth Every Evening., Disp: 30 capsule, Rfl: 3  •  methylphenidate (RITALIN) 10 MG tablet, Take 1 tablet by mouth Daily. For ADD 3 months, Disp: 90 tablet, Rfl: 0  •  Multiple Vitamins-Minerals (MULTIVITAMIN ADULT PO), Take 1 tablet by mouth Daily., Disp: , Rfl:   •  OXYGEN-HELIUM IN, Inhale 2.5 L Daily., Disp: , Rfl:   •  pantoprazole (PROTONIX) 40 MG EC tablet, Take 1 tablet by mouth Daily., Disp: 90 tablet, Rfl: 3  •  rOPINIRole (REQUIP) 0.5 MG tablet, Take 0.5-1 mg by mouth Every Night. Take 1 hour before bedtime. , Disp: , Rfl:   •  sodium chloride 7 % nebulizer solution nebulizer solution, , Disp: , Rfl:   •  TiZANidine (ZANAFLEX) 6 MG capsule, Take 1 capsule by mouth Every Night., Disp: 90 capsule, Rfl: 3  •  vitamin B-12 (CYANOCOBALAMIN) 2500 MCG sublingual tablet tablet, Take 2,500 mcg by mouth Every Evening., Disp: , Rfl:   •  Mirabegron ER (MYRBETRIQ) 25 MG tablet sustained-release 24 hour 24 hr tablet, Take 1 tablet by mouth Daily., Disp: 30 tablet, Rfl: 3    Aspirin use counseling: Does not need ASA (and currently is not on it)    Current medication list  contains no high risk medications.  No harmful drug interactions have been identified.     Family History   Problem Relation Age of Onset   • Thyroid disease Mother    • No Known Problems Father    • Malig Hyperthermia Neg Hx        Social History     Tobacco Use   • Smoking status: Never Smoker   • Smokeless tobacco: Never Used   Substance Use Topics   • Alcohol use: No     Comment: red wine occassional       Past Surgical History:   Procedure Laterality Date   • BRONCHOSCOPY N/A 4/30/2016    Procedure: BRONCHOSCOPY with BAL of right lower lobe and left  lower lobe.  ;  Surgeon: Nando Diaz MD;  Location: [facility] ENDOSCOPY;  Service:    • BRONCHOSCOPY N/A 4/28/2017    Procedure: BRONCHOSCOPY;  Surgeon: Katharina Salter MD;  Location: [facility] ENDOSCOPY;  Service:    • BRONCHOSCOPY Bilateral 6/29/2017    Procedure: BRONCHOSCOPY WITH WASHINGS;  Surgeon: Katharina Salter MD;  Location: [facility] ENDOSCOPY;  Service:    • BRONCHOSCOPY N/A 1/22/2018    Procedure: BRONCHOSCOPY AT BEDSIDE with BAL;  Surgeon: Katharina Salter MD;  Location: [facility] ENDOSCOPY;  Service:    • BRONCHOSCOPY N/A 1/30/2018    Procedure: BRONCHOSCOPY with BAL and washing;  Surgeon: Shreyas Wild MD;  Location: [facility] ENDOSCOPY;  Service:    • BRONCHOSCOPY Bilateral 4/24/2018    Procedure: BRONCHOSCOPY WITH WASHING;  Surgeon: Katharina Salter MD;  Location: [facility] ENDOSCOPY;  Service: Pulmonary   • BRONCHOSCOPY N/A 12/28/2019    Procedure: BRONCHOSCOPY with bilateral washing;  Surgeon: Katharina Salter MD;  Location: [facility] ENDOSCOPY;  Service: Pulmonary   • COLONOSCOPY  05/17/2013    eh, ih, tort, sig tics   • COLONOSCOPY N/A 5/10/2019    Non-thrombosed external hemorrhoids found on perianal exam, Diverticulosis, Tortuous colon, One 5 mm polyp in the mid ascending colon, IH. Path: Tubular adenoma.    • ENDOSCOPY  03/19/2015    z line irreg, hh   • HIP OPEN REDUCTION Right 1/17/2018    Procedure: HIP OPEN REDUCTION INTERNAL FIXATION WITH DYNAMIC HIP SCREW;   Surgeon: Les Black MD;  Location: [facility] MAIN OR;  Service:    • SPINE SURGERY     • TONSILLECTOMY     • TOTAL HIP ARTHROPLASTY REVISION Right 9/23/2019    Procedure: HIP  REVISION RIGHT;  Surgeon: Isauro Warner MD;  Location: [facility] MAIN OR;  Service: Orthopedics       Patient Active Problem List   Diagnosis   • Thrush, oral   • ADD (attention deficit disorder)   • Hemorrhoids   • Bronchiectasis with acute lower respiratory infection (CMS/HCC)   • Diverticul disease small and large intestine, no perforati or abscess   • Benign non-nodular prostatic hyperplasia without lower urinary tract symptoms   • Gastroesophageal reflux disease   • Malaise and fatigue   • Environmental allergies   • Hemoptysis   • Dyslipidemia   • Primary osteoarthritis involving multiple joints   • Pneumonia of right lower lobe due to infectious organism (CMS/HCC)   • Abnormal EKG   • COPD (chronic obstructive pulmonary disease) (CMS/HCC)   • Mild malnutrition (CMS/HCC)   • Acute on chronic respiratory failure with hypoxia (CMS/HCC)   • Fracture, intertrochanteric, right femur, closed, initial encounter (CMS/[facility])   • Chronic diastolic CHF (congestive heart failure) (CMS/HCC)   • Hematoma of frontal scalp   • Postoperative anemia due to acute blood loss   • Urinary retention   • Hemorrhagic disorder due to circulating anticoagulants (CMS/HCC)   • History of fracture of right hip   • Closed fracture of right hip with routine healing   • Chronic low back pain with sciatica   • Change in bowel function   • Rectal bleeding   • Prostate cancer (CMS/HCC)   • On home oxygen therapy   • Acute viral bronchitis       Review of Systems   Constitutional: Negative for fatigue and fever.   HENT: Positive for congestion. Negative for trouble swallowing.    Eyes: Negative for discharge and visual disturbance.   Respiratory: Negative for choking and shortness of breath.    Cardiovascular: Negative for chest pain and palpitations.   Gastrointestinal:  "Negative for abdominal pain and blood in stool.   Endocrine: Negative.    Genitourinary: Negative for genital sores and hematuria.   Musculoskeletal: Positive for arthralgias, back pain, gait problem, joint swelling and neck pain.   Skin: Negative for color change, pallor, rash and wound.   Allergic/Immunologic: Positive for environmental allergies. Negative for immunocompromised state.   Neurological: Negative for facial asymmetry and speech difficulty.   Psychiatric/Behavioral: Negative for hallucinations and suicidal ideas.       Objective     Vitals:    03/06/20 1330   BP: 120/64   Pulse: 86   Temp: 97.7 °F (36.5 °C)   SpO2: 98%   Weight: 79.7 kg (175 lb 9.6 oz)   Height: 180.3 cm (71\")       Patient's Body mass index is 24.49 kg/m². BMI is within normal parameters. No follow-up required..      No exam data present    The patient has no evidence of cognitve impairment.     Physical Exam   Constitutional: He appears well-developed and well-nourished.   HENT:   Head: Normocephalic.   Right Ear: External ear normal.   Left Ear: External ear normal.   Nose: Nose normal.   Mouth/Throat: Oropharynx is clear and moist.   Eyes: Pupils are equal, round, and reactive to light. Conjunctivae and EOM are normal.   Neck: Normal range of motion. Neck supple.   Cardiovascular: Normal rate, regular rhythm, normal heart sounds and intact distal pulses.   Pulmonary/Chest: Breath sounds normal.   Abdominal: Soft. Bowel sounds are normal.   Genitourinary: Rectum normal, prostate normal and penis normal.   Musculoskeletal: He exhibits edema, tenderness and deformity.   Neurological: He is alert. He displays abnormal reflex. He exhibits abnormal muscle tone. Coordination abnormal.   Skin: Skin is warm and dry.   Psychiatric: He has a normal mood and affect. His behavior is normal. Judgment and thought content normal.       Recent Lab Results:  Lab Results   Component Value Date     (H) 02/09/2020     Lab Results   Component " Value Date    CHOL 197 07/19/2018    TRIG 71 07/19/2018    HDL 52 07/19/2018    VLDL 14.2 07/19/2018    LDLHDL 2.52 07/19/2018       Assessment/Plan   Continue bronchodilators No. 2 continue pain medications #3 follow-up with cardiology on a regular basis  Age-appropriate Screening Schedule:  Refer to the list below for future screening recommendations based on patient's age, sex and/or medical conditions.      Health Maintenance   Topic Date Due   • ZOSTER VACCINE (2 of 2) 09/26/2016   • PROSTATE CANCER SCREENING  08/01/2020   • COLONOSCOPY  05/10/2022   • TDAP/TD VACCINES (2 - Td) 01/01/2023   • INFLUENZA VACCINE  Addressed       Medicare Risks and Personalized Health Plan:  NA      CMS-Preventive Services Quick Reference  Medicare Preventive Services Addressed:  ADAM    Advance Care Planning:  ACP discussion was held with the patient during this visit. Patient has an advance directive in EMR which is still valid.     Diagnoses and all orders for this visit:    1. Medicare annual wellness visit, subsequent (Primary)    2. Chronic diastolic CHF (congestive heart failure) (CMS/[facility])    3. Simple chronic bronchitis (CMS/[facility])    4. Primary osteoarthritis involving multiple joints    5. Opioid use agreement exists  -     Compliance Drug Analysis, Ur - Urine, Clean Catch    Other orders  -     methylphenidate (RITALIN) 10 MG tablet; Take 1 tablet by mouth Daily. For ADD 3 months  Dispense: 90 tablet; Refill: 0  -     TiZANidine (ZANAFLEX) 6 MG capsule; Take 1 capsule by mouth Every Night.  Dispense: 90 capsule; Refill: 3  -     pantoprazole (PROTONIX) 40 MG EC tablet; Take 1 tablet by mouth Daily.  Dispense: 90 tablet; Refill: 3  -     Mirabegron ER (MYRBETRIQ) 25 MG tablet sustained-release 24 hour 24 hr tablet; Take 1 tablet by mouth Daily.  Dispense: 30 tablet; Refill: 3        An After Visit Summary and PPPS with all of these plans were given to the patient.      Follow Up:  Return in about 4 months (around 7/6/2020),  or if symptoms worsen or fail to improve, for Recheck.

## 2020-03-06 NOTE — PATIENT INSTRUCTIONS
Medicare Wellness  Personal Prevention Plan of Service     Date of Office Visit:  2020  Encounter Provider:  Erich Aviles MD  Place of Service:  [facility]  Patient Name: Tony Leonard  :  1940    As part of the Medicare Wellness portion of your visit today, we are providing you with this personalized preventive plan of services (PPPS). This plan is based upon recommendations of the [place] Preventive Services Task Force (USPSTF) and the Advisory Committee on Immunization Practices (ACIP).    This lists the preventive care services that should be considered, and provides dates of when you are due. Items listed as completed are up-to-date and do not require any further intervention.    Health Maintenance   Topic Date Due   • ZOSTER VACCINE (2 of 2) 2016   • PROSTATE CANCER SCREENING  2020   • MEDICARE ANNUAL WELLNESS  2021   • COLONOSCOPY  05/10/2022   • TDAP/TD VACCINES (2 - Td) 2023   • Pneumococcal Vaccine Once at 65 Years Old  Completed   • INFLUENZA VACCINE  Addressed       Orders Placed This Encounter   Procedures   • Compliance Drug Analysis, Ur - Urine, Clean Catch       Return in about 4 months (around 2020), or if symptoms worsen or fail to improve, for Recheck.

## 2020-03-12 LAB — CONV REPORT SUMMARY: NORMAL

## 2020-04-15 ENCOUNTER — APPOINTMENT (OUTPATIENT)
Dept: CT IMAGING | Facility: HOSPITAL | Age: 80
End: 2020-04-15

## 2020-04-15 ENCOUNTER — HOSPITAL ENCOUNTER (OUTPATIENT)
Dept: CT IMAGING | Facility: HOSPITAL | Age: 80
Discharge: HOME | End: 2020-04-15
Admitting: INTERNAL MEDICINE

## 2020-04-15 DIAGNOSIS — R91.8 PULMONARY INFILTRATE: ICD-10-CM

## 2020-04-15 PROCEDURE — 71250 CT THORAX DX C-: CPT

## 2020-04-16 ENCOUNTER — TELEPHONE (OUTPATIENT)
Dept: GASTROENTEROLOGY | Facility: CLINIC | Age: 80
End: 2020-04-16

## 2020-04-16 NOTE — TELEPHONE ENCOUNTER
----- Message from Rowdy SWAIN MD sent at 4/16/2020 12:05 PM EDT -----  Regarding: CT scan of the chest results  Please forward this information to patient's primary care tender.  Thank you.  ----- Message -----  From: Interface, Rad Results [place]  Sent: 4/16/2020   7:01 AM EDT  To: Rowdy SWAIN MD

## 2020-04-20 ENCOUNTER — TELEPHONE (OUTPATIENT)
Dept: FAMILY MEDICINE CLINIC | Facility: CLINIC | Age: 80
End: 2020-04-20

## 2020-04-20 RX ORDER — METHYLPHENIDATE HYDROCHLORIDE 10 MG/1
10 TABLET ORAL DAILY
Qty: 90 TABLET | Refills: 0 | Status: SHIPPED | OUTPATIENT
Start: 2020-04-20 | End: 2020-06-15 | Stop reason: SDUPTHER

## 2020-04-20 NOTE — TELEPHONE ENCOUNTER
PT REQUESTED REFILLS FOR     HYDROCODONE-ACETAMETAPHINE 10/3.5 MG    PT CALLBACK [phone]    PHARMACY CONFIRMED   [facility]

## 2020-04-21 RX ORDER — HYDROCODONE BITARTRATE AND ACETAMINOPHEN 10; 325 MG/1; MG/1
1 TABLET ORAL EVERY 8 HOURS PRN
Qty: 90 TABLET | Refills: 0 | Status: SHIPPED | OUTPATIENT
Start: 2020-04-21 | End: 2020-07-13 | Stop reason: SDUPTHER

## 2020-04-21 NOTE — TELEPHONE ENCOUNTER
Dr Aviles last gave RX 4/2019.He uses it for low back pain when severe would like sent to [facility].

## 2020-05-28 ENCOUNTER — OFFICE VISIT (OUTPATIENT)
Dept: GASTROENTEROLOGY | Facility: CLINIC | Age: 80
End: 2020-05-28

## 2020-05-28 VITALS — WEIGHT: 175 LBS | BODY MASS INDEX: 24.5 KG/M2 | HEIGHT: 71 IN | TEMPERATURE: 97 F

## 2020-05-28 DIAGNOSIS — K21.9 GASTROESOPHAGEAL REFLUX DISEASE, ESOPHAGITIS PRESENCE NOT SPECIFIED: Primary | ICD-10-CM

## 2020-05-28 DIAGNOSIS — R10.84 GENERALIZED ABDOMINAL PAIN: ICD-10-CM

## 2020-05-28 DIAGNOSIS — K59.00 CONSTIPATION, UNSPECIFIED CONSTIPATION TYPE: ICD-10-CM

## 2020-05-28 PROCEDURE — 99214 OFFICE O/P EST MOD 30 MIN: CPT | Performed by: NURSE PRACTITIONER

## 2020-05-28 RX ORDER — FLUOXETINE HYDROCHLORIDE 20 MG/1
20 CAPSULE ORAL DAILY
COMMUNITY
End: 2020-09-18 | Stop reason: SDUPTHER

## 2020-05-28 RX ORDER — PANTOPRAZOLE SODIUM 40 MG/1
40 TABLET, DELAYED RELEASE ORAL DAILY
Qty: 30 TABLET | Refills: 11 | Status: SHIPPED | OUTPATIENT
Start: 2020-05-28 | End: 2021-05-04

## 2020-05-28 NOTE — PROGRESS NOTES
Chief Complaint   Patient presents with   • Abdominal Pain   • change in bowel habits       Tony Leonard is a  80 y.o. male here for a follow up visit for abdominal pain.    HPI  80-year-old male presents today for follow-up visit for abdominal pain and GERD.  He is a patient of Dr. Mattson.  He was last seen in the office on 2/3/2020.  At that time he was having a lot of generalized abdominal pain with change in bowel habits, reflux and a cough.  He underwent a CT scan of the abdomen and pelvis that showed:    IMPRESSION :   1. Right lower lobe findings as discussed. Recommend follow-up with  thoracic imaging.  2. Tiny left renal lesion, likely cyst.  3. Diverticulosis, constipation, no obstruction or acute inflammation.    At that time he was treated for pneumonia and a follow-up CT scan of the chest did show resolution of his pulmonary issues.  He does have some memory issues and tells me he forgot to get back on his Protonix for his reflux symptoms.  So instead of getting on it he is not been taking anything and having reflux symptoms on a daily basis.  He tells me his reflux is definitely worse after he eats.  He also forgot to get back on some MiraLAX.  He admits that his bowels move but they are hard and they come out like little balls.  Sometimes he has to strain to have a bowel movement.  He denies any dysphagia, abdominal pain, nausea and vomiting, diarrhea, rectal bleeding or melena.  He admits his appetite is okay and his weight is stable.  He does have a history of adenomatous colon polyps in the past.  He also has a history of prostate cancer.  His last colonoscopy was done on 5/2019.    Past Medical History:   Diagnosis Date   • ADHD (attention deficit hyperactivity disorder)    • Bronchiectasis (CMS/HCC)    • Colon polyp    • COPD (chronic obstructive pulmonary disease) (CMS/HCC)    • Diverticulosis    • Hard of hearing    • On home oxygen therapy     2 LITERS   • Pneumonia    • Prostate cancer  (CMS/[facility]) 4/22/2019       Past Surgical History:   Procedure Laterality Date   • BRONCHOSCOPY N/A 4/30/2016    Procedure: BRONCHOSCOPY with BAL of right lower lobe and left  lower lobe.  ;  Surgeon: Nando Diaz MD;  Location: [facility] ENDOSCOPY;  Service:    • BRONCHOSCOPY N/A 4/28/2017    Procedure: BRONCHOSCOPY;  Surgeon: Katharina Salter MD;  Location: [facility] ENDOSCOPY;  Service:    • BRONCHOSCOPY Bilateral 6/29/2017    Procedure: BRONCHOSCOPY WITH WASHINGS;  Surgeon: Katharina Salter MD;  [facility] ENDOSCOPY;  Service:    • BRONCHOSCOPY N/A 1/22/2018    Procedure: BRONCHOSCOPY AT BEDSIDE with BAL;  Surgeon: Katharina Salter MD;  [facility];  Service:    • BRONCHOSCOPY N/A 1/30/2018    Procedure: BRONCHOSCOPY with BAL and washing;  Surgeon: Shreyas Wild MD;  Location: [facility] ENDOSCOPY;  Service:    • BRONCHOSCOPY Bilateral 4/24/2018    Procedure: BRONCHOSCOPY WITH WASHING;  Surgeon: Katharina Salter MD;  Location: [facility] ENDOSCOPY;  Service: Pulmonary   • BRONCHOSCOPY N/A 12/28/2019    Procedure: BRONCHOSCOPY with bilateral washing;  Surgeon: Katharina Salter MD;  Location: [facility] ENDOSCOPY;  Service: Pulmonary   • COLONOSCOPY  05/17/2013    eh, ih, tort, sig tics   • COLONOSCOPY N/A 5/10/2019    Non-thrombosed external hemorrhoids found on perianal exam, Diverticulosis, Tortuous colon, One 5 mm polyp in the mid ascending colon, IH. Path: Tubular adenoma.    • ENDOSCOPY  03/19/2015    z line irreg, hh   • HIP OPEN REDUCTION Right 1/17/2018    Procedure: HIP OPEN REDUCTION INTERNAL FIXATION WITH DYNAMIC HIP SCREW;  Surgeon: Les Black MD;  Location: [facility] MAIN OR;  Service:    • SPINE SURGERY     • TONSILLECTOMY     • TOTAL HIP ARTHROPLASTY REVISION Right 9/23/2019    Procedure: HIP  REVISION RIGHT;  Surgeon: Isauro Warner MD;  Location: [facility] MAIN OR;  Service: Orthopedics       Scheduled Meds:    Continuous Infusions:  No current facility-administered medications for this visit.      PRN Meds:.    Allergies   Allergen Reactions   • Daliresp [Roflumilast] Anaphylaxis   • Latex Rash   • Sulfa Antibiotics Rash       Social History     Socioeconomic History   • Marital status: Single     Spouse name: Not on file   • Number of children: Not on file   • Years of education: Not on file   • Highest education level: Not on file   Tobacco Use   • Smoking status: Never Smoker   • Smokeless tobacco: Never Used   Substance and Sexual Activity   • Alcohol use: No     Comment: red wine occassional   • Drug use: No   • Sexual activity: Defer       Family History   Problem Relation Age of Onset   • Thyroid disease Mother    • No Known Problems Father    • Malig Hyperthermia Neg Hx        Review of Systems   Constitutional: Negative for appetite change, chills, diaphoresis, fatigue, fever and unexpected weight change.   HENT: Negative for nosebleeds, postnasal drip, sore throat, trouble swallowing and voice change.    Respiratory: Negative for cough, choking, chest tightness, shortness of breath and wheezing.    Cardiovascular: Negative for chest pain, palpitations and leg swelling.   Gastrointestinal: Positive for constipation. Negative for abdominal distention, abdominal pain, anal bleeding, blood in stool, diarrhea, nausea, rectal pain and vomiting.   Endocrine: Negative for polydipsia, polyphagia and polyuria.   Musculoskeletal: Negative for gait problem.   Skin: Negative for rash and wound.   Allergic/Immunologic: Negative for food allergies.   Neurological: Negative for dizziness, speech difficulty and light-headedness.   Psychiatric/Behavioral: Negative for confusion, self-injury, sleep disturbance and suicidal ideas.       Vitals:    05/28/20 0915   Temp: 97 °F (36.1 °C)       Physical Exam   Constitutional: He is oriented to person, place, and time. He appears well-developed and well-nourished. He does not appear ill. No distress.   HENT:   Head: Normocephalic.   Eyes: Pupils are equal, round, and  reactive to light.   Cardiovascular: Normal rate, regular rhythm and normal heart sounds.   Pulmonary/Chest: Effort normal and breath sounds normal.   Abdominal: Soft. Bowel sounds are normal. He exhibits no distension and no mass. There is no hepatosplenomegaly. There is no tenderness. There is no rebound and no guarding. No hernia.   Musculoskeletal: Normal range of motion.   Ambulates with a walker   Neurological: He is alert and oriented to person, place, and time.   Skin: Skin is warm and dry.   Psychiatric: He has a normal mood and affect. His speech is normal and behavior is normal. Judgment normal.       No radiology results for the last 7 days     Assessment and plan    1. Gastroesophageal reflux disease, esophagitis presence not specified    2. Constipation, unspecified constipation type    3. Generalized abdominal pain    GERD is not well controlled with him off of the Protonix.  I want to get him back on the Protonix 40 mg once daily.  I have had another discussion with him about it and written down on paper what he needs to be doing as far as getting back on the Protonix 40 mg once daily.  Continue GERD precautions.  I think his constipation is an issue.  He really needs to get back on the MiraLAX daily.  I want him drinking a lot more water.  I have also written down his sheet for him to get back on the MiraLAX OTC once daily.  I think this will really help him.  Overall I think getting over pneumonia he is doing fairly well.  Patient to call the office with any issues.  Patient to follow-up with me in 1 month.

## 2020-06-15 NOTE — TELEPHONE ENCOUNTER
PATIENT CALLING IN TO REQUEST REFILL OF:    methylphenidate (Ritalin) 10 MG tablet    [facility] [place] [place]  [address] [place] [address] [phone] [facility] [phone]

## 2020-06-16 RX ORDER — METHYLPHENIDATE HYDROCHLORIDE 10 MG/1
10 TABLET ORAL DAILY
Qty: 90 TABLET | Refills: 0 | Status: SHIPPED | OUTPATIENT
Start: 2020-06-16 | End: 2020-10-14 | Stop reason: SDUPTHER

## 2020-06-24 RX ORDER — ROPINIROLE 0.5 MG/1
.5-1 TABLET, FILM COATED ORAL NIGHTLY
Qty: 90 TABLET | Refills: 1 | Status: SHIPPED | OUTPATIENT
Start: 2020-06-24 | End: 2020-09-08

## 2020-06-24 NOTE — TELEPHONE ENCOUNTER
Caller: Tony Leonard    Relationship: Self    Best call back number: [phone]    Medication needed:   Requested Prescriptions     Pending Prescriptions Disp Refills   • rOPINIRole (REQUIP) 0.5 MG tablet       Sig: Take 1-2 tablets by mouth Every Night. Take 1 hour before bedtime.       When do you need the refill by: PATIENT IS  TOTALLY OUT BUT WANTED IT SENT TO HIS MAIL ORDER.    What details did the patient provide when requesting the medication:     Does the patient have less than a 3 day supply:  [x] Yes  [] No    What is the patient's preferred pharmacy:        [facility]

## 2020-06-29 ENCOUNTER — OFFICE VISIT (OUTPATIENT)
Dept: GASTROENTEROLOGY | Facility: CLINIC | Age: 80
End: 2020-06-29

## 2020-06-29 VITALS — WEIGHT: 179 LBS | HEIGHT: 71 IN | BODY MASS INDEX: 25.06 KG/M2 | TEMPERATURE: 97.8 F

## 2020-06-29 DIAGNOSIS — R10.10 PAIN OF UPPER ABDOMEN: ICD-10-CM

## 2020-06-29 DIAGNOSIS — K21.9 GASTROESOPHAGEAL REFLUX DISEASE, ESOPHAGITIS PRESENCE NOT SPECIFIED: Primary | ICD-10-CM

## 2020-06-29 DIAGNOSIS — K59.00 CONSTIPATION, UNSPECIFIED CONSTIPATION TYPE: ICD-10-CM

## 2020-06-29 PROCEDURE — 99214 OFFICE O/P EST MOD 30 MIN: CPT | Performed by: NURSE PRACTITIONER

## 2020-06-29 RX ORDER — POLYETHYLENE GLYCOL 3350 17 G/17G
17 POWDER, FOR SOLUTION ORAL DAILY
Qty: 30 PACKET | Refills: 11 | Status: SHIPPED | OUTPATIENT
Start: 2020-06-29 | End: 2021-07-20

## 2020-06-29 NOTE — PROGRESS NOTES
Chief Complaint   Patient presents with   • Constipation       Tony Leonard is a  80 y.o. male here for a follow up visit for constipation.    HPI  80-year-old male presents today for follow-up visit for constipation and GERD.  He is a patient of Dr. Mattson.  He was last seen in the office on 5/28/2020.  He has a history of GERD with upper abdominal pain admits he is doing really well as long as he remembers to take his Protonix 40 mg once daily.  He denies any breakthrough reflux at this time.  He also has a history of chronic constipation and admits he does really well as long as he remembers the MiraLAX.  He tells me today he is ran out of it again.  He denies any dysphagia, reflux, abdominal pain, nausea and vomiting, diarrhea, constipation, rectal bleeding or melena.  He was appetite is good and his weight is stable.  Last colonoscopy was done on 5/10/2019.  Past Medical History:   Diagnosis Date   • ADHD (attention deficit hyperactivity disorder)    • Bronchiectasis (CMS/HCC)    • Colon polyp    • COPD (chronic obstructive pulmonary disease) (CMS/HCC)    • Diverticulosis    • Hard of hearing    • On home oxygen therapy     2 LITERS   • Pneumonia    • Prostate cancer (CMS/HCC) 4/22/2019       Past Surgical History:   Procedure Laterality Date   • BRONCHOSCOPY N/A 4/30/2016    Procedure: BRONCHOSCOPY with BAL of right lower lobe and left  lower lobe.  ;  Surgeon: Nando Diaz MD;  Location: [facility] ENDOSCOPY;  Service:    • BRONCHOSCOPY N/A 4/28/2017    Procedure: BRONCHOSCOPY;  Surgeon: Katharina Salter MD;  Location: [facility] ENDOSCOPY;  Service:    • BRONCHOSCOPY Bilateral 6/29/2017    Procedure: BRONCHOSCOPY WITH WASHINGS;  Surgeon: Katharina Salter MD;  Location: [facility] ENDOSCOPY;  Service:    • BRONCHOSCOPY N/A 1/22/2018    Procedure: BRONCHOSCOPY AT BEDSIDE with BAL;  Surgeon: Katharina Salter MD;  Location: [facility] ENDOSCOPY;  Service:    • BRONCHOSCOPY N/A 1/30/2018    Procedure: BRONCHOSCOPY with  BAL and washing;  Surgeon: Shreyas Wild MD;  Location: [facility] ENDOSCOPY;  Service:    • BRONCHOSCOPY Bilateral 4/24/2018    Procedure: BRONCHOSCOPY WITH WASHING;  Surgeon: Katharina Salter MD;  Location: [facility] ENDOSCOPY;  Service: Pulmonary   • BRONCHOSCOPY N/A 12/28/2019    Procedure: BRONCHOSCOPY with bilateral washing;  Surgeon: Katharina Salter MD;  Location: [facility] ENDOSCOPY;  Service: Pulmonary   • COLONOSCOPY  05/17/2013    eh, ih, tort, sig tics   • COLONOSCOPY N/A 5/10/2019    Non-thrombosed external hemorrhoids found on perianal exam, Diverticulosis, Tortuous colon, One 5 mm polyp in the mid ascending colon, IH. Path: Tubular adenoma.    • ENDOSCOPY  03/19/2015    z line ismag, hh   • HIP OPEN REDUCTION Right 1/17/2018    Procedure: HIP OPEN REDUCTION INTERNAL FIXATION WITH DYNAMIC HIP SCREW;  Surgeon: Les Black MD;  Location: [facility] OR;  Service:    • SPINE SURGERY     • TONSILLECTOMY     • TOTAL HIP ARTHROPLASTY REVISION Right 9/23/2019    Procedure: HIP  REVISION RIGHT;  Surgeon: Isauro Warner MD;  Location: [facility] MAIN OR;  Service: Orthopedics       Scheduled Meds:    Continuous Infusions:  No current facility-administered medications for this visit.     PRN Meds:.    Allergies   Allergen Reactions   • Daliresp [Roflumilast] Anaphylaxis   • Latex Rash   • Sulfa Antibiotics Rash       Social History     Socioeconomic History   • Marital status: Single     Spouse name: Not on file   • Number of children: Not on file   • Years of education: Not on file   • Highest education level: Not on file   Tobacco Use   • Smoking status: Never Smoker   • Smokeless tobacco: Never Used   Substance and Sexual Activity   • Alcohol use: No     Comment: red wine occassional   • Drug use: No   • Sexual activity: Defer       Family History   Problem Relation Age of Onset   • Thyroid disease Mother    • No Known Problems Father    • Malig Hyperthermia Neg Hx        Review of Systems   Constitutional: Negative for  appetite change, chills, diaphoresis, fatigue, fever and unexpected weight change.   HENT: Negative for nosebleeds, postnasal drip, sore throat, trouble swallowing and voice change.    Respiratory: Negative for cough, choking, chest tightness, shortness of breath and wheezing.    Cardiovascular: Negative for chest pain, palpitations and leg swelling.   Gastrointestinal: Negative for abdominal distention, abdominal pain, anal bleeding, blood in stool, constipation, diarrhea, nausea, rectal pain and vomiting.   Endocrine: Negative for polydipsia, polyphagia and polyuria.   Musculoskeletal: Negative for gait problem.   Skin: Negative for rash and wound.   Allergic/Immunologic: Negative for food allergies.   Neurological: Negative for dizziness, speech difficulty and light-headedness.   Psychiatric/Behavioral: Negative for confusion, self-injury, sleep disturbance and suicidal ideas.       Vitals:    06/29/20 1400   Temp: 97.8 °F (36.6 °C)       Physical Exam   Constitutional: He is oriented to person, place, and time. He appears well-developed and well-nourished. He does not appear ill. No distress.   HENT:   Head: Normocephalic.   Eyes: Pupils are equal, round, and reactive to light.   Cardiovascular: Normal rate, regular rhythm and normal heart sounds.   Pulmonary/Chest: Effort normal and breath sounds normal.   Abdominal: Soft. Bowel sounds are normal. He exhibits no distension and no mass. There is no hepatosplenomegaly. There is no tenderness. There is no rebound and no guarding. No hernia.   Musculoskeletal: Normal range of motion.   Ambulates with a walker   Neurological: He is alert and oriented to person, place, and time.   Skin: Skin is warm and dry.   Psychiatric: He has a normal mood and affect. His speech is normal and behavior is normal. Judgment normal.       No radiology results for the last 7 days     Assessment and plan     1. Gastroesophageal reflux disease, esophagitis presence not specified    2.  Constipation, unspecified constipation type    3. Pain of upper abdomen    GERD seems well controlled on Protonix 40 mg once daily.  His abdominal pain has completely resolved.  Bowels are moving well when he takes the MiraLAX.  He has run out of it so I will refill it.  To continue the MiraLAX daily.  Continue to increase his water.  Patient to call the office with any issues.  Patient to follow-up with me in 3 months.

## 2020-07-13 RX ORDER — HYDROCODONE BITARTRATE AND ACETAMINOPHEN 10; 325 MG/1; MG/1
1 TABLET ORAL EVERY 8 HOURS PRN
Qty: 90 TABLET | Refills: 0 | Status: SHIPPED | OUTPATIENT
Start: 2020-07-13 | End: 2020-10-14 | Stop reason: ALTCHOICE

## 2020-07-13 NOTE — TELEPHONE ENCOUNTER
Caller: Horacio Tony OSCAR    Relationship: Self    Best call back number: [phone]    Medication needed:   Requested Prescriptions     Pending Prescriptions Disp Refills   • HYDROcodone-acetaminophen (NORCO)  MG per tablet 90 tablet 0     Sig: Take 1 tablet by mouth Every 8 (Eight) Hours As Needed for Severe Pain .       When do you need the refill by: ASAP    What details did the patient provide when requesting the medication: PT IS OUT OF REFILLS, NEEDS A NEW PRESCRIPTION SENT IN    Does the patient have less than a 3 day supply:  [] Yes  [x] No    What is the patient's preferred pharmacy:    [address] AT [address]  - [phone] [facility] [phone]   [phone]

## 2020-09-08 RX ORDER — ROPINIROLE 0.5 MG/1
TABLET, FILM COATED ORAL
Qty: 90 TABLET | Refills: 7 | Status: SHIPPED | OUTPATIENT
Start: 2020-09-08 | End: 2020-09-11 | Stop reason: SDUPTHER

## 2020-09-11 RX ORDER — ROPINIROLE 0.5 MG/1
0.5 TABLET, FILM COATED ORAL 3 TIMES DAILY
Qty: 90 TABLET | Refills: 7 | Status: SHIPPED | OUTPATIENT
Start: 2020-09-11 | End: 2020-09-14 | Stop reason: SDUPTHER

## 2020-09-11 NOTE — TELEPHONE ENCOUNTER
PATIENT CALLED FOR MED REFILL  rOPINIRole (REQUIP) 0.5 MG tablet    [facility] - [place], [place] - [address] [place] [address] - [phone] [facility] [phone]   [phone]    PATIENT CALL BACK NUMBER [phone]

## 2020-09-14 RX ORDER — ROPINIROLE 0.5 MG/1
0.5 TABLET, FILM COATED ORAL 3 TIMES DAILY
Qty: 90 TABLET | Refills: 1 | Status: SHIPPED | OUTPATIENT
Start: 2020-09-14 | End: 2020-09-17 | Stop reason: SDUPTHER

## 2020-09-14 RX ORDER — ROPINIROLE 0.5 MG/1
0.5 TABLET, FILM COATED ORAL 3 TIMES DAILY
Qty: 90 TABLET | Refills: 1 | Status: SHIPPED | OUTPATIENT
Start: 2020-09-14 | End: 2020-09-14 | Stop reason: SDUPTHER

## 2020-09-17 ENCOUNTER — TELEPHONE (OUTPATIENT)
Dept: FAMILY MEDICINE CLINIC | Facility: CLINIC | Age: 80
End: 2020-09-17

## 2020-09-17 RX ORDER — ROPINIROLE 0.5 MG/1
TABLET, FILM COATED ORAL
Qty: 90 TABLET | Refills: 1 | Status: CANCELLED | OUTPATIENT
Start: 2020-09-17

## 2020-09-17 RX ORDER — ROPINIROLE 0.5 MG/1
0.5 TABLET, FILM COATED ORAL 3 TIMES DAILY
Qty: 90 TABLET | Refills: 1 | Status: SHIPPED | OUTPATIENT
Start: 2020-09-17 | End: 2021-09-14

## 2020-09-17 NOTE — TELEPHONE ENCOUNTER
Patient takes ropinirole HCL 0.5mg 1 one hour before bedtime [facility] only one (correct directions on script) once daily one hour before bedtime

## 2020-09-18 RX ORDER — FLUOXETINE HYDROCHLORIDE 20 MG/1
20 CAPSULE ORAL DAILY
Qty: 90 CAPSULE | Refills: 1 | Status: SHIPPED | OUTPATIENT
Start: 2020-09-18 | End: 2020-11-03 | Stop reason: SDUPTHER

## 2020-09-18 NOTE — TELEPHONE ENCOUNTER
Caller: Tony Leonard    Relationship: Self    Best call back number: [phone]     Medication needed:   Requested Prescriptions     Pending Prescriptions Disp Refills   • FLUoxetine (PROzac) 20 MG capsule        Sig: Take 1 capsule by mouth Daily.       When do you need the refill by: 09/18/2020    What details did the patient provide when requesting the medication: IS OUT OF MEDICATION    Does the patient have less than a 3 day supply:  [x] Yes  [] No    What is the patient's preferred pharmacy:      [facility] - [address] [phone] [facility] [phone]

## 2020-09-21 RX ORDER — BUDESONIDE 0.5 MG/2ML
0.5 INHALANT ORAL 2 TIMES DAILY
Qty: 30 EACH | Refills: 3 | Status: SHIPPED | OUTPATIENT
Start: 2020-09-21 | End: 2022-11-19 | Stop reason: HOSPADM

## 2020-09-23 ENCOUNTER — TELEPHONE (OUTPATIENT)
Dept: FAMILY MEDICINE CLINIC | Facility: CLINIC | Age: 80
End: 2020-09-23

## 2020-09-23 DIAGNOSIS — R91.8 PULMONARY INFILTRATES: Primary | ICD-10-CM

## 2020-09-29 ENCOUNTER — OFFICE VISIT (OUTPATIENT)
Dept: GASTROENTEROLOGY | Facility: CLINIC | Age: 80
End: 2020-09-29

## 2020-09-29 VITALS — HEIGHT: 71 IN | WEIGHT: 164.2 LBS | TEMPERATURE: 98.8 F | BODY MASS INDEX: 22.99 KG/M2

## 2020-09-29 DIAGNOSIS — R10.33 PERIUMBILICAL ABDOMINAL PAIN: ICD-10-CM

## 2020-09-29 DIAGNOSIS — K59.00 CONSTIPATION, UNSPECIFIED CONSTIPATION TYPE: Primary | ICD-10-CM

## 2020-09-29 DIAGNOSIS — K21.9 GASTROESOPHAGEAL REFLUX DISEASE, ESOPHAGITIS PRESENCE NOT SPECIFIED: ICD-10-CM

## 2020-09-29 PROCEDURE — 99214 OFFICE O/P EST MOD 30 MIN: CPT | Performed by: NURSE PRACTITIONER

## 2020-09-29 NOTE — PROGRESS NOTES
Chief Complaint   Patient presents with   • Heartburn   • Constipation       Tony Leonard is a  80 y.o. male here for a follow up visit for GERD.    HPI  80-year-old male presents today for follow-up visit for GERD.  He is a patient of Dr. Mattson.  He was last seen in the office on 6/29/2020.  He has a history of GERD admits he does really well on Protonix 40 mg daily.  He denies any breakthrough reflux at this time.  He reports his appetite is pretty good.  He still has issues with constipation.  He tells me he has a daily bowel movement with MiraLAX but he still feels like he needs to go sometimes and nothing comes out.  He still continues to have some periumbilical abdominal pain that is worse when he is more constipated.  I did check a CT scan of the abdomen and pelvis this past February and it was positive for moderate stool burden and diverticulosis.  He tells me lying down in a heating pad definitely helps the abdominal pain.  He admits it is better when he has a bowel movement.  He does not take any fiber or probiotics.  His last colonoscopy was in 2019.  His last EGD was in 2015.  He is planning to have a back procedure tomorrow.  He is really help and is getting help with his chronic back pain.  He denies any dysphagia, reflux, nausea and vomiting, diarrhea, rectal bleeding or melena.  He has lost 10 pounds since May.  Past Medical History:   Diagnosis Date   • ADHD (attention deficit hyperactivity disorder)    • Bronchiectasis (CMS/HCC)    • Colon polyp    • COPD (chronic obstructive pulmonary disease) (CMS/HCC)    • Diverticulosis    • Hard of hearing    • On home oxygen therapy     2 LITERS   • Pneumonia    • Prostate cancer (CMS/HCC) 4/22/2019       Past Surgical History:   Procedure Laterality Date   • BRONCHOSCOPY N/A 4/30/2016    Procedure: BRONCHOSCOPY with BAL of right lower lobe and left  lower lobe.  ;  Surgeon: Nando Diaz MD;  Location: [facility] ENDOSCOPY;  Service:    •  BRONCHOSCOPY N/A 4/28/2017    Procedure: BRONCHOSCOPY;  Surgeon: Katharina Salter MD;  Location: [facility] ENDOSCOPY;  Service:    • BRONCHOSCOPY Bilateral 6/29/2017    Procedure: BRONCHOSCOPY WITH WASHINGS;  Surgeon: Katharina Salter MD;  Location: [facility] ENDOSCOPY;  Service:    • BRONCHOSCOPY N/A 1/22/2018    Procedure: BRONCHOSCOPY AT BEDSIDE with BAL;  Surgeon: Katharina Salter MD;  Location: [facility] ENDOSCOPY;  Service:    • BRONCHOSCOPY N/A 1/30/2018    Procedure: BRONCHOSCOPY with BAL and washing;  Surgeon: Shreyas Wild MD;  Location: [facility] ENDOSCOPY;  Service:    • BRONCHOSCOPY Bilateral 4/24/2018    Procedure: BRONCHOSCOPY WITH WASHING;  Surgeon: Katharina Salter MD;  Location: [facility] ENDOSCOPY;  Service: Pulmonary   • BRONCHOSCOPY N/A 12/28/2019    Procedure: BRONCHOSCOPY with bilateral washing;  Surgeon: Katharina Salter MD;  Location: [facility] ENDOSCOPY;  Service: Pulmonary   • COLONOSCOPY  05/17/2013    eh, ih, tort, sig tics   • COLONOSCOPY N/A 5/10/2019    Non-thrombosed external hemorrhoids found on perianal exam, Diverticulosis, Tortuous colon, One 5 mm polyp in the mid ascending colon, IH. Path: Tubular adenoma.    • ENDOSCOPY  03/19/2015    z line isma,    • HIP OPEN REDUCTION Right 1/17/2018    Procedure: HIP OPEN REDUCTION INTERNAL FIXATION WITH DYNAMIC HIP SCREW;  Surgeon: Les Black MD;  Location: [facility];  Service:    • SPINE SURGERY     • TONSILLECTOMY     • TOTAL HIP ARTHROPLASTY REVISION Right 9/23/2019    Procedure: HIP  REVISION RIGHT;  Surgeon: Isauro Warner MD;  Location: [facility] OR;  Service: Orthopedics       Scheduled Meds:    Continuous Infusions:No current facility-administered medications for this visit.       PRN Meds:.    Allergies   Allergen Reactions   • Daliresp [Roflumilast] Anaphylaxis   • Latex Rash   • Sulfa Antibiotics Rash       Social History     Socioeconomic History   • Marital status: Single     Spouse name: Not on file   • Number of children: Not on file    • Years of education: Not on file   • Highest education level: Not on file   Tobacco Use   • Smoking status: Never Smoker   • Smokeless tobacco: Never Used   Substance and Sexual Activity   • Alcohol use: No     Comment: red wine occassional   • Drug use: No   • Sexual activity: Defer       Family History   Problem Relation Age of Onset   • Thyroid disease Mother    • No Known Problems Father    • Malig Hyperthermia Neg Hx        Review of Systems   Constitutional: Negative for appetite change, chills, diaphoresis, fatigue, fever and unexpected weight change.   HENT: Negative for nosebleeds, postnasal drip, sore throat, trouble swallowing and voice change.    Respiratory: Negative for cough, choking, chest tightness, shortness of breath and wheezing.    Cardiovascular: Negative for chest pain, palpitations and leg swelling.   Gastrointestinal: Positive for abdominal distention, abdominal pain and constipation. Negative for anal bleeding, blood in stool, diarrhea, nausea, rectal pain and vomiting.   Endocrine: Negative for polydipsia, polyphagia and polyuria.   Musculoskeletal: Negative for gait problem.   Skin: Negative for rash and wound.   Allergic/Immunologic: Negative for food allergies.   Neurological: Negative for dizziness, speech difficulty and light-headedness.   Psychiatric/Behavioral: Negative for confusion, self-injury, sleep disturbance and suicidal ideas.       Vitals:    09/29/20 0908   Temp: 98.8 °F (37.1 °C)       Physical Exam  Constitutional:       General: He is not in acute distress.     Appearance: He is well-developed. He is not ill-appearing.   HENT:      Head: Normocephalic.   Eyes:      Pupils: Pupils are equal, round, and reactive to light.   Cardiovascular:      Rate and Rhythm: Normal rate and regular rhythm.      Heart sounds: Normal heart sounds.   Pulmonary:      Effort: Pulmonary effort is normal.      Breath sounds: Normal breath sounds.   Abdominal:      General: Bowel sounds are  normal. There is distension.      Palpations: Abdomen is soft. There is no mass.      Tenderness: There is no abdominal tenderness. There is no guarding or rebound.      Hernia: No hernia is present.   Musculoskeletal: Normal range of motion.      Comments: Ambulates with walker   Skin:     General: Skin is warm and dry.   Neurological:      Mental Status: He is alert and oriented to person, place, and time.   Psychiatric:         Speech: Speech normal.         Behavior: Behavior normal.         Judgment: Judgment normal.         No radiology results for the last 7 days     Assessment and plan     1. Constipation, unspecified constipation type    2. Periumbilical abdominal pain    3. Gastroesophageal reflux disease, esophagitis presence not specified      Constipation seems pretty stable for the most part on daily MiraLAX.  He still continues though to complain of periumbilical abdominal pain that is worse when he is more constipated.  I think you should definitely try adding more fiber to his diet.  I recommended he start a good daily fiber supplement.  GERD seems well controlled with Protonix 40 mg once daily.  Continue GERD precautions.  He did have a CT scan done this past February which was positive for moderate constipation and diverticulosis.  Patient to call the office with any issues.  Patient to follow-up with me in 6 months.

## 2020-10-14 ENCOUNTER — OFFICE VISIT (OUTPATIENT)
Dept: FAMILY MEDICINE CLINIC | Facility: CLINIC | Age: 80
End: 2020-10-14

## 2020-10-14 VITALS
SYSTOLIC BLOOD PRESSURE: 112 MMHG | BODY MASS INDEX: 23.1 KG/M2 | DIASTOLIC BLOOD PRESSURE: 68 MMHG | TEMPERATURE: 98 F | WEIGHT: 165 LBS | OXYGEN SATURATION: 92 % | HEART RATE: 64 BPM | HEIGHT: 71 IN

## 2020-10-14 DIAGNOSIS — I50.23 ACUTE ON CHRONIC SYSTOLIC (CONGESTIVE) HEART FAILURE (HCC): ICD-10-CM

## 2020-10-14 DIAGNOSIS — R19.7 DIARRHEA, UNSPECIFIED TYPE: Primary | ICD-10-CM

## 2020-10-14 DIAGNOSIS — J44.9 CHRONIC OBSTRUCTIVE PULMONARY DISEASE, UNSPECIFIED COPD TYPE (HCC): ICD-10-CM

## 2020-10-14 PROBLEM — I50.32 CHRONIC DIASTOLIC CHF (CONGESTIVE HEART FAILURE): Status: RESOLVED | Noted: 2018-01-16 | Resolved: 2020-10-14

## 2020-10-14 LAB
ALBUMIN SERPL-MCNC: 4.2 G/DL (ref 3.5–5.2)
ALBUMIN/GLOB SERPL: 1.4 G/DL
ALP SERPL-CCNC: 76 U/L (ref 39–117)
ALT SERPL W P-5'-P-CCNC: 21 U/L (ref 1–41)
AMYLASE SERPL-CCNC: 56 U/L (ref 28–100)
ANION GAP SERPL CALCULATED.3IONS-SCNC: 12.2 MMOL/L (ref 5–15)
AST SERPL-CCNC: 19 U/L (ref 1–40)
BILIRUB SERPL-MCNC: 0.4 MG/DL (ref 0–1.2)
BUN SERPL-MCNC: 25 MG/DL (ref 8–23)
BUN/CREAT SERPL: 23.6 (ref 7–25)
CALCIUM SPEC-SCNC: 9.5 MG/DL (ref 8.6–10.5)
CHLORIDE SERPL-SCNC: 100 MMOL/L (ref 98–107)
CO2 SERPL-SCNC: 26.8 MMOL/L (ref 22–29)
CREAT SERPL-MCNC: 1.06 MG/DL (ref 0.76–1.27)
ERYTHROCYTE [DISTWIDTH] IN BLOOD BY AUTOMATED COUNT: 14.7 % (ref 12.3–15.4)
GFR SERPL CREATININE-BSD FRML MDRD: 67 ML/MIN/1.73
GLOBULIN UR ELPH-MCNC: 2.9 GM/DL
GLUCOSE SERPL-MCNC: 116 MG/DL (ref 65–99)
HCT VFR BLD AUTO: 36.7 % (ref 37.5–51)
HGB BLD-MCNC: 12.4 G/DL (ref 13–17.7)
LYMPHOCYTES # BLD AUTO: 2 10*3/MM3 (ref 0.7–3.1)
LYMPHOCYTES NFR BLD AUTO: 28.8 % (ref 19.6–45.3)
MCH RBC QN AUTO: 32.2 PG (ref 26.6–33)
MCHC RBC AUTO-ENTMCNC: 33.8 G/DL (ref 31.5–35.7)
MCV RBC AUTO: 95.1 FL (ref 79–97)
MONOCYTES # BLD AUTO: 0.8 10*3/MM3 (ref 0.1–0.9)
MONOCYTES NFR BLD AUTO: 11.6 % (ref 5–12)
NEUTROPHILS NFR BLD AUTO: 4.1 10*3/MM3 (ref 1.7–7)
NEUTROPHILS NFR BLD AUTO: 59.6 % (ref 42.7–76)
PLATELET # BLD AUTO: 347 10*3/MM3 (ref 140–450)
PMV BLD AUTO: 7.1 FL (ref 6–12)
POTASSIUM SERPL-SCNC: 4.2 MMOL/L (ref 3.5–5.2)
PROT SERPL-MCNC: 7.1 G/DL (ref 6–8.5)
RBC # BLD AUTO: 3.86 10*6/MM3 (ref 4.14–5.8)
SODIUM SERPL-SCNC: 139 MMOL/L (ref 136–145)
WBC # BLD AUTO: 6.9 10*3/MM3 (ref 3.4–10.8)

## 2020-10-14 PROCEDURE — 36415 COLL VENOUS BLD VENIPUNCTURE: CPT | Performed by: INTERNAL MEDICINE

## 2020-10-14 PROCEDURE — 85025 COMPLETE CBC W/AUTO DIFF WBC: CPT | Performed by: INTERNAL MEDICINE

## 2020-10-14 PROCEDURE — 80053 COMPREHEN METABOLIC PANEL: CPT | Performed by: INTERNAL MEDICINE

## 2020-10-14 PROCEDURE — 99214 OFFICE O/P EST MOD 30 MIN: CPT | Performed by: INTERNAL MEDICINE

## 2020-10-14 PROCEDURE — 82150 ASSAY OF AMYLASE: CPT | Performed by: INTERNAL MEDICINE

## 2020-10-14 RX ORDER — OXYCODONE AND ACETAMINOPHEN 7.5; 325 MG/1; MG/1
1 TABLET ORAL EVERY 4 HOURS PRN
Qty: 180 TABLET | Refills: 0
Start: 2020-10-14 | End: 2020-11-18 | Stop reason: SDUPTHER

## 2020-10-14 RX ORDER — ALBUTEROL SULFATE 1.25 MG/3ML
1 SOLUTION RESPIRATORY (INHALATION)
COMMUNITY
End: 2022-11-19 | Stop reason: HOSPADM

## 2020-10-14 RX ORDER — CALCIUM CARBONATE 160(400)MG
1 TABLET,CHEWABLE ORAL DAILY
Qty: 1 EACH | Refills: 0 | Status: SHIPPED | OUTPATIENT
Start: 2020-10-14 | End: 2022-11-21

## 2020-10-14 RX ORDER — COLESTIPOL HYDROCHLORIDE 5 G/5G
5 GRANULE, FOR SUSPENSION ORAL 2 TIMES DAILY
Qty: 500 G | Refills: 3 | Status: SHIPPED | OUTPATIENT
Start: 2020-10-14 | End: 2022-11-21

## 2020-10-14 RX ORDER — SACCHAROMYCES BOULARDII 250 MG
250 CAPSULE ORAL 2 TIMES DAILY
Qty: 60 CAPSULE | Refills: 4 | Status: SHIPPED | OUTPATIENT
Start: 2020-10-14 | End: 2021-09-08

## 2020-10-14 RX ORDER — FUROSEMIDE 40 MG/1
40 TABLET ORAL
Status: ON HOLD | COMMUNITY
End: 2022-11-19 | Stop reason: SDUPTHER

## 2020-10-14 RX ORDER — METHYLPHENIDATE HYDROCHLORIDE 10 MG/1
10 TABLET ORAL DAILY
Qty: 90 TABLET | Refills: 0 | Status: SHIPPED | OUTPATIENT
Start: 2020-10-14 | End: 2021-01-04 | Stop reason: SDUPTHER

## 2020-10-14 RX ORDER — AZITHROMYCIN 250 MG/1
250 TABLET, FILM COATED ORAL 3 TIMES WEEKLY
COMMUNITY
End: 2020-10-23

## 2020-10-14 RX ORDER — KETOCONAZOLE 20 MG/ML
SHAMPOO TOPICAL 2 TIMES WEEKLY
Qty: 120 ML | Refills: 3 | Status: SHIPPED | OUTPATIENT
Start: 2020-10-15 | End: 2021-10-29

## 2020-10-14 RX ORDER — INFLUENZA A VIRUS A/MICHIGAN/45/2015 X-275 (H1N1) ANTIGEN (FORMALDEHYDE INACTIVATED), INFLUENZA A VIRUS A/SINGAPORE/INFIMH-16-0019/2016 IVR-186 (H3N2) ANTIGEN (FORMALDEHYDE INACTIVATED), INFLUENZA B VIRUS B/PHUKET/3073/2013 ANTIGEN (FORMALDEHYDE INACTIVATED), AND INFLUENZA B VIRUS B/MARYLAND/15/2016 BX-69A ANTIGEN (FORMALDEHYDE INACTIVATED) 60; 60; 60; 60 UG/.7ML; UG/.7ML; UG/.7ML; UG/.7ML
INJECTION, SUSPENSION INTRAMUSCULAR
COMMUNITY
Start: 2020-09-11 | End: 2020-10-23 | Stop reason: SDUPTHER

## 2020-10-14 RX ORDER — VIT C/B6/B5/MAGNESIUM/HERB 173 50-5-6-5MG
500 CAPSULE ORAL DAILY
COMMUNITY
End: 2022-12-01 | Stop reason: HOSPADM

## 2020-10-14 RX ORDER — GABAPENTIN 100 MG/1
100 CAPSULE ORAL NIGHTLY
COMMUNITY
End: 2020-10-23 | Stop reason: SDUPTHER

## 2020-10-14 RX ORDER — POTASSIUM CHLORIDE 1500 MG/1
20 TABLET, FILM COATED, EXTENDED RELEASE ORAL DAILY
COMMUNITY
Start: 2020-09-11 | End: 2021-11-02

## 2020-10-14 NOTE — PROGRESS NOTES
Subjective   Tony Leonard is a 80 y.o. male.     Vitals:    10/14/20 1535   BP: 112/68   Pulse: 64   Temp: 98 °F (36.7 °C)   SpO2: 92%      Body mass index is 23.01 kg/m².     History of Present Illness   Patient came in with diarrhea.  Patient is having massive diarrhea over the past several weeks.  It is uncontrollable and patient has to bring extra pair of pants with him.  Patient has been taking antibiotics because of his chronic COPD.  He has several acute exacerbations and trying to use with aggressive therapy.  Patient was placed on Colestid 5 g in 8 ounces water twice a day.  Patient was also given a probiotic.  He was also given stool containers and asked to follow-up in 1 week.  Patient does have a chronic congestive heart failure but is been stable over the past several months.  Patient was also advised to take Gatorade or Powerade 0 to replace his fluids.    Dictated utilizing Dragon dictation. If there are questions or for further clarification, please contact me.  The following portions of the patient's history were reviewed and updated as appropriate: allergies, current medications, past family history, past medical history, past social history, past surgical history and problem list.    Review of Systems   Constitutional: Negative for fatigue and fever.   HENT: Positive for congestion. Negative for trouble swallowing.    Eyes: Negative for discharge and visual disturbance.   Respiratory: Negative for choking and shortness of breath.    Cardiovascular: Negative for chest pain and palpitations.   Gastrointestinal: Positive for diarrhea. Negative for abdominal pain and blood in stool.   Endocrine: Negative.    Genitourinary: Negative for genital sores and hematuria.   Musculoskeletal: Negative for gait problem and joint swelling.   Skin: Negative for color change, pallor, rash and wound.   Allergic/Immunologic: Positive for environmental allergies. Negative for immunocompromised state.    Neurological: Negative for facial asymmetry and speech difficulty.   Psychiatric/Behavioral: Negative for hallucinations and suicidal ideas.       Objective   Physical Exam  Vitals signs and nursing note reviewed.   Constitutional:       Appearance: Normal appearance. He is well-developed.   HENT:      Head: Normocephalic and atraumatic.      Nose: Nose normal.      Mouth/Throat:      Mouth: Mucous membranes are moist.      Pharynx: Oropharynx is clear.   Eyes:      Extraocular Movements: Extraocular movements intact.      Conjunctiva/sclera: Conjunctivae normal.      Pupils: Pupils are equal, round, and reactive to light.   Neck:      Musculoskeletal: Normal range of motion and neck supple.   Cardiovascular:      Rate and Rhythm: Normal rate and regular rhythm.      Heart sounds: Normal heart sounds. No murmur. No friction rub. No gallop.    Pulmonary:      Effort: Pulmonary effort is normal. No respiratory distress.      Breath sounds: Normal breath sounds. No stridor. No wheezing, rhonchi or rales.   Chest:      Chest wall: No tenderness.   Abdominal:      General: Bowel sounds are normal. There is no distension.      Palpations: Abdomen is soft. There is no mass.      Tenderness: There is no abdominal tenderness. There is no right CVA tenderness, left CVA tenderness, guarding or rebound.      Hernia: No hernia is present.   Musculoskeletal: Normal range of motion.   Skin:     General: Skin is warm and dry.   Neurological:      General: No focal deficit present.      Mental Status: He is alert and oriented to person, place, and time. Mental status is at baseline.   Psychiatric:         Mood and Affect: Mood normal.         Behavior: Behavior normal.         Thought Content: Thought content normal.         Judgment: Judgment normal.         Assessment/Plan #1 Florastor twice daily #2 Colestid 5 g 8 ounces of water twice daily #3 labs #4 stool work-up #5 follow-up in 1 week  Problems Addressed this Visit      Cardiovascular and Mediastinum              Acute on chronic systolic (congestive) heart failure (CMS/[facility])          Respiratory              COPD (chronic obstructive pulmonary disease) (CMS/[facility])    Relevant Medications    umeclidinium-vilanterol (Anoro Ellipta) 62.5-25 MCG/INH aerosol powder  inhaler    albuterol (ACCUNEB) 1.25 MG/3ML nebulizer solution            Other Visit Diagnoses     Diarrhea, unspecified type    -  Primary    Relevant Orders    Clostridium Difficile Toxin, PCR - Stool, Per Rectum    Ova & Parasite Examination - Stool, Per Rectum    Stool Culture (Reference Lab) - Stool, Per Rectum    CBC & Differential (Completed)    Comprehensive Metabolic Panel    Amylase    CBC Auto Differential (Completed)      Diagnoses     Diagnosis Codes Comments    Diarrhea, unspecified type    -  Primary ICD-10-CM: R19.7  ICD-9-CM: 787.91     Chronic obstructive pulmonary disease, unspecified COPD type (CMS/[facility])     ICD-10-CM: J44.9  ICD-9-CM: 496     Acute on chronic systolic (congestive) heart failure (CMS/[facility])     ICD-10-CM: I50.23  ICD-9-CM: 428.23, 428.0

## 2020-10-16 LAB — C DIFF TOX GENS STL QL NAA+PROBE: NEGATIVE

## 2020-10-16 PROCEDURE — 87493 C DIFF AMPLIFIED PROBE: CPT | Performed by: INTERNAL MEDICINE

## 2020-10-19 LAB
O+P SPEC MICRO: NORMAL
O+P STL CONC: NORMAL

## 2020-10-20 LAB
BACTERIA SPEC CULT: NORMAL
BACTERIA SPEC CULT: NORMAL
CAMPYLOBACTER STL CULT: NORMAL
E COLI SXT STL QL IA: NEGATIVE
SALM + SHIG STL CULT: NORMAL

## 2020-10-23 ENCOUNTER — OFFICE VISIT (OUTPATIENT)
Dept: FAMILY MEDICINE CLINIC | Facility: CLINIC | Age: 80
End: 2020-10-23

## 2020-10-23 VITALS
HEART RATE: 97 BPM | SYSTOLIC BLOOD PRESSURE: 112 MMHG | OXYGEN SATURATION: 92 % | DIASTOLIC BLOOD PRESSURE: 66 MMHG | TEMPERATURE: 97.8 F | HEIGHT: 71 IN | WEIGHT: 158.4 LBS | BODY MASS INDEX: 22.18 KG/M2

## 2020-10-23 DIAGNOSIS — M54.40 BILATERAL LOW BACK PAIN WITH SCIATICA, SCIATICA LATERALITY UNSPECIFIED, UNSPECIFIED CHRONICITY: Primary | ICD-10-CM

## 2020-10-23 DIAGNOSIS — R53.83 MALAISE AND FATIGUE: ICD-10-CM

## 2020-10-23 DIAGNOSIS — J47.0 BRONCHIECTASIS WITH ACUTE LOWER RESPIRATORY INFECTION (HCC): ICD-10-CM

## 2020-10-23 DIAGNOSIS — R53.81 MALAISE AND FATIGUE: ICD-10-CM

## 2020-10-23 PROCEDURE — 99214 OFFICE O/P EST MOD 30 MIN: CPT | Performed by: INTERNAL MEDICINE

## 2020-10-23 PROCEDURE — 72100 X-RAY EXAM L-S SPINE 2/3 VWS: CPT | Performed by: INTERNAL MEDICINE

## 2020-10-23 RX ORDER — GABAPENTIN 100 MG/1
100 CAPSULE ORAL NIGHTLY
Qty: 270 CAPSULE | Refills: 1 | Status: SHIPPED | OUTPATIENT
Start: 2020-10-23 | End: 2021-06-15

## 2020-10-23 NOTE — PROGRESS NOTES
Subjective   Tony Leonard is a 80 y.o. male.     Vitals:    10/23/20 1107   BP: 112/66   Pulse: 97   Temp: 97.8 °F (36.6 °C)   SpO2: 92%      Body mass index is 22.09 kg/m².     History of Present Illness   Patient was seen for low back pain.  Patient has an extensive history of severe low back pain.  It is located in the lumbar area and does not radiate.  Pain is worse to flexion-extension of the lumbar spine.  Patient requires pain pill for pain relief.  Patient states his pain is becoming progressively worse and chest x-ray shows extensive scoliosis, osteophytes, multiple narrowed disc spaces.  Patient was referred to pain clinic.  Patient also has malaise fatigue associated with his pain.  Patient does not want medication at this time.  Patient is not suicidal.  Patient also has bronchiectasis and has frequent respiratory tract infections.  Patient uses bronchodilators and is stable to present time.    X-Ray  Interpretation report in house X-rays that I personally viewed    Relevant Clinical Issues/Diagnoses/Indications: Severe low back pain L-spine        Clinical Findings: #1 severe scoliosis #2 osteophytes at multiple levels #3 severe disc narrowing at multiple levels          Comparative Data: No previous x-ray          Date of Previous X-ray:    Change on current X-ray:        Dictated utilizing Dragon dictation. If there are questions or for further clarification, please contact me.  The following portions of the patient's history were reviewed and updated as appropriate: allergies, current medications, past family history, past medical history, past social history, past surgical history and problem list.    Review of Systems   Constitutional: Negative for fatigue and fever.   HENT: Positive for congestion. Negative for trouble swallowing.    Eyes: Negative for discharge and visual disturbance.   Respiratory: Negative for choking and shortness of breath.    Cardiovascular: Negative for chest pain and  palpitations.   Gastrointestinal: Negative for abdominal pain and blood in stool.   Endocrine: Negative.    Genitourinary: Negative for genital sores and hematuria.   Musculoskeletal: Positive for back pain, gait problem and joint swelling.   Skin: Negative for color change, pallor, rash and wound.   Allergic/Immunologic: Positive for environmental allergies. Negative for immunocompromised state.   Neurological: Negative for facial asymmetry and speech difficulty.   Psychiatric/Behavioral: Negative for hallucinations and suicidal ideas.       Objective   Physical Exam  Vitals signs and nursing note reviewed.   Constitutional:       Appearance: Normal appearance. He is well-developed.   HENT:      Head: Normocephalic and atraumatic.      Nose: Nose normal.      Mouth/Throat:      Mouth: Mucous membranes are moist.      Pharynx: Oropharynx is clear.   Eyes:      Extraocular Movements: Extraocular movements intact.      Conjunctiva/sclera: Conjunctivae normal.      Pupils: Pupils are equal, round, and reactive to light.   Neck:      Musculoskeletal: Normal range of motion and neck supple.   Cardiovascular:      Rate and Rhythm: Normal rate and regular rhythm.      Heart sounds: Normal heart sounds. No murmur. No friction rub.   Pulmonary:      Effort: Pulmonary effort is normal. No respiratory distress.      Breath sounds: Normal breath sounds. No stridor. No wheezing, rhonchi or rales.   Chest:      Chest wall: No tenderness.   Abdominal:      General: Bowel sounds are normal.      Palpations: Abdomen is soft.   Musculoskeletal: Normal range of motion.         General: Swelling, tenderness and deformity present.   Skin:     General: Skin is warm and dry.   Neurological:      General: No focal deficit present.      Mental Status: He is alert and oriented to person, place, and time. Mental status is at baseline.      Motor: Weakness present.      Coordination: Coordination abnormal.      Gait: Gait abnormal.    Psychiatric:         Mood and Affect: Mood normal.         Behavior: Behavior normal.         Thought Content: Thought content normal.         Judgment: Judgment normal.         Assessment/Plan #1 L-spine #2 pain clinic #3 continue pain medicine  Problems Addressed this Visit     Respiratory              Bronchiectasis with acute lower respiratory infection (CMS/HCC)    Relevant Medications    umeclidinium-vilanterol (Anoro Ellipta) 62.5-25 MCG/INH aerosol powder  inhaler          Other              Malaise and fatigue            Other Visit Diagnoses     Bilateral low back pain with sciatica, sciatica laterality unspecified, unspecified chronicity    -  Primary    Relevant Medications    gabapentin (NEURONTIN) 100 MG capsule    Other Relevant Orders    XR Spine Lumbar 2 or 3 View (Completed)    Ambulatory Referral to Pain Management      Diagnoses     Diagnosis Codes Comments    Bilateral low back pain with sciatica, sciatica laterality unspecified, unspecified chronicity    -  Primary ICD-10-CM: M54.40  ICD-9-CM: 724.3     Malaise and fatigue     ICD-10-CM: R53.81, R53.83  ICD-9-CM: 780.79     Bronchiectasis with acute lower respiratory infection (CMS/HCC)     ICD-10-CM: J47.0  ICD-9-CM: 494.1

## 2020-11-02 NOTE — PROGRESS NOTES
The patient has a pain history of the following:  Chronic low back pain  Lumbar spondylosis   Lumbar disc disease   Scoliosis   Lumbar stenosis     Previous interventions that the patient has received include:   Epidural steroid injections  Possible other interventions     Pain medications include:  Gabapentin  Percocet 7.5-325mg   Requip  Tizanidine    Other conservative modalities which the patient reports using include:  Physical Therapy: yes  Neck or back surgery: yes  Past pain management: yes    Past Significant Surgical History:  L2-4 microlumbar laminectomy 9/30/2020  Lumbar surgery 1985    HPI:       CHIEF COMPLAINT: Back Pain    Tony Leonard is a 80 y.o. male referred here by Erich Aviles MD. Tony Leonard presents to the office for evaluation and treatment of Back Pain      Onset:  1985  Inciting Event:  Nothing in particular  Location:  Low back   Pain: Pain described as ache and shooting. Located right side of the low back and does radiate into the lateral left foot.  Severity:  Pain rated as a 3/10. Symptoms have been constant.  Exacerbation:  Activity.   Alleviation:  Rest.  Associated Symptoms:   He denies any new onset of bowel or bladder weakness (however he has frequent urination after taking water pills), significant leg weakness or saddle anesthesia.   Ambulates: With rolling walker    He recently had back surgery on 9/30/2020 at [facility] with Dr. Chan for what sounds like lumbar stenosis.  He states that Dr. Vega made a large curve in his back and he doesn't plan on going back to see him for follow-up.  He brings his x-ray imaging with him today, however I am unable to open discs in the office to see the actual images.   He's been taking Percocet and Tizanidine for the pain, which he says keeps it under control.  He takes 2 pills per day without side effects and has approximately 20 more days of the medication.        PEG Assessment   What number best describes your pain on average  in the past week?6  What number best describes how, during the past week, pain has interfered with your enjoyment of life?7  What number best describes how, during the past week, pain has interfered with your general activity?  5        Current Outpatient Medications:   •  albuterol (ACCUNEB) 1.25 MG/3ML nebulizer solution, Inhale 1 ampule., Disp: , Rfl:   •  albuterol (PROVENTIL) (2.5 MG/3ML) 0.083% nebulizer solution, Take 2.5 mg by nebulization 4 (Four) Times a Day. (Patient taking differently: Take 2.5 mg by nebulization 2 (Two) Times a Day.), Disp: 360 mL, Rfl: 3  •  budesonide (PULMICORT) 0.5 MG/2ML nebulizer solution, Take 2 mL by nebulization 2 (Two) Times a Day. 1 vial only twice daily, Disp: 30 each, Rfl: 3  •  calcium carbonate (TUMS) 500 MG chewable tablet, Chew 1 tablet As Needed for Indigestion or Heartburn., Disp: , Rfl:   •  colestipol (COLESTID) 5 g granules, Take 5 g by mouth 2 (Two) Times a Day. Prn after diarrhea, Disp: 500 g, Rfl: 3  •  FLUoxetine (PROzac) 20 MG capsule, Take 1 capsule by mouth Daily., Disp: 90 capsule, Rfl: 1  •  furosemide (LASIX) 40 MG tablet, Take 40 mg by mouth., Disp: , Rfl:   •  gabapentin (NEURONTIN) 100 MG capsule, Take 1 capsule by mouth Every Night. 3 months, Disp: 270 capsule, Rfl: 1  •  ketoconazole (NIZORAL) 2 % shampoo, Apply  topically to the appropriate area as directed 2 (Two) Times a Week., Disp: 120 mL, Rfl: 3  •  methylphenidate (Ritalin) 10 MG tablet, Take 1 tablet by mouth Daily. For ADD 3 months, Disp: 90 tablet, Rfl: 0  •  Misc. Devices (Rollator Ultra-Light) misc, 1 application Daily. Severe DJD, Disp: 1 each, Rfl: 0  •  Multiple Vitamins-Minerals (MULTIVITAMIN ADULT PO), Take 1 tablet by mouth Daily., Disp: , Rfl:   •  oxyCODONE-acetaminophen (Percocet) 7.5-325 MG per tablet, Take 1 tablet by mouth Every 4 (Four) Hours As Needed for Severe Pain ., Disp: 180 tablet, Rfl: 0  •  OXYGEN-HELIUM IN, Inhale 2.5 L Daily As Needed., Disp: , Rfl:   •   pantoprazole (PROTONIX) 40 MG EC tablet, Take 1 tablet by mouth Daily., Disp: 30 tablet, Rfl: 11  •  polyethylene glycol (MiraLax) 17 g packet, Take 17 g by mouth Daily., Disp: 30 packet, Rfl: 11  •  potassium chloride ER (K-TAB) 20 MEQ tablet controlled-release ER tablet, Take 20 mEq by mouth Daily., Disp: , Rfl:   •  rOPINIRole (REQUIP) 0.5 MG tablet, Take 1 tablet by mouth 3 (Three) Times a Day. Take 1 hour before bedtime., Disp: 90 tablet, Rfl: 1  •  saccharomyces boulardii (Florastor) 250 MG capsule, Take 1 capsule by mouth 2 (Two) Times a Day., Disp: 60 capsule, Rfl: 4  •  sodium chloride 7 % nebulizer solution nebulizer solution, , Disp: , Rfl:   •  TiZANidine (ZANAFLEX) 6 MG capsule, Take 1 capsule by mouth Every Night., Disp: 90 capsule, Rfl: 3  •  Turmeric 500 MG capsule, Take  by mouth Daily., Disp: , Rfl:   •  umeclidinium-vilanterol (Anoro Ellipta) 62.5-25 MCG/INH aerosol powder  inhaler, Inhale 1 puff Daily., Disp: 3 each, Rfl: 3  •  vitamin B-12 (CYANOCOBALAMIN) 2500 MCG sublingual tablet tablet, Take 2,500 mcg by mouth Every Evening., Disp: , Rfl:     The following portions of the patient's history were reviewed and updated as appropriate: allergies, current medications, past family history, past medical history, past social history, past surgical history and problem list.      REVIEW OF PERTINENT MEDICAL DATA    XR Spine 1 View, Specify Level2020  [facility]  Result Narrative   RADIOLOGY REPORT    FACILITY:  [facility]  UNIT/AGE/GENDER: M.PERIOP  OP      AGE:80 Y          SEX:M  PATIENT NAME/:  RAMIRO CASEY OSCAR    1940  UNIT NUMBER:  [id number]  ACCOUNT NUMBER:  [id number]  ACCESSION NUMBER:  [id number]    EXAMINATION: XR SPINE 1 VIEW, SPECIFY LEVEL    DATE: 2020    COMPARISON: None available    HISTORY: L2-L4 microlumbar laminectomy    FINDINGS/IMPRESSION: Fluoroscopic time was 5 seconds with 2 images obtained. There is a radiopaque  localizer at the posterior L4-L5 level for surgical control. Please see intraoperative note for detailed findings.    Dictated by: Jono Dobbs M.D.    Images and Report reviewed and interpreted by: Jono Dobbs M.D.    <PS><Electronically signed by: Jono Dobbs M.D.>  2020    D: 2020  T: 2020   Other Result Information   Interface, Rad Out - 2020 11:06 PM EDT  RADIOLOGY REPORT    FACILITY:  [facility]  UNIT/AGE/GENDER: M.PERIOP  OP      AGE:80 Y          SEX:M  PATIENT NAME/:  RAMIRO CASEY L    1940  UNIT NUMBER:  [id number]  ACCOUNT NUMBER:  [id number]  ACCESSION NUMBER:  [id number]    EXAMINATION: XR SPINE 1 VIEW, SPECIFY LEVEL    DATE: 2020    COMPARISON: None available    HISTORY: L2-L4 microlumbar laminectomy    FINDINGS/IMPRESSION: Fluoroscopic time was 5 seconds with 2 images obtained. There is a radiopaque localizer at the posterior L4-L5 level for surgical control. Please see intraoperative note for detailed findings.    Dictated by: Jono Dobbs M.D.    Images and Report reviewed and interpreted by: Jono Dobbs M.D.    <PS><Electronically signed by: Jono Dobbs M.D.>  2020    D: 2020  T: 2020 2301       10/14/2020 Creatinine 1.06, Platelets 347 (10*3)    Review of Systems   Constitutional: Negative for fatigue.   HENT: Positive for hearing loss. Negative for congestion.    Eyes: Negative for visual disturbance.   Respiratory: Positive for cough, shortness of breath and wheezing.    Cardiovascular: Positive for leg swelling. Negative for chest pain and palpitations.   Gastrointestinal: Negative for constipation.   Genitourinary: Negative for difficulty urinating.   Musculoskeletal: Positive for back pain.   Neurological: Positive for numbness. Negative for weakness.   Psychiatric/Behavioral: Negative for sleep disturbance and suicidal ideas. The patient is nervous/anxious.      I  "have reviewed and confirmed the accuracy of the ROS as documented by the MA/LPN/RN Anum Bhatt MD      Vitals:    11/05/20 0818   BP: 124/74   Pulse: 75   Resp: 18   Temp: 96.8 °F (36 °C)   SpO2: 93%   Weight: 73.1 kg (161 lb 3.2 oz)   Height: 180.3 cm (71\")   PainSc:   3   PainLoc: Back         Objective   Physical Exam  Vitals signs reviewed.   Constitutional:       General: He is not in acute distress.  HENT:      Head: Normocephalic.      Ears:      Comments: Hearing normal  Eyes:      General: No scleral icterus.     Conjunctiva/sclera: Conjunctivae normal.   Cardiovascular:      Rate and Rhythm: Normal rate.      Comments: Bilateral lower extremity edema  Pulmonary:      Effort: Pulmonary effort is normal. No respiratory distress.   Musculoskeletal:      Comments: Ambulation: Stooped forward with a rolling walker  Lumbar Exam:  Appearance: Scoliotic curve present and scarring present without redness or drainage.  Mild swelling around recent surgery site.  Normal temperature.  Well approximated.   Palpated over lumbosacral paravertebral regions and transverse processes with positive tenderness appreciated, Right.   Straight leg raise is positive radiculopathy, Left.    Skin:     General: Skin is warm and dry.   Neurological:      General: No focal deficit present.      Mental Status: He is alert and oriented to person, place, and time.   Psychiatric:         Mood and Affect: Mood normal.         Thought Content: Thought content normal.         Assessment/Plan   Diagnoses and all orders for this visit:    1. History of back surgery (Primary)  -     XR Spine Lumbar 2 or 3 View; Future  -     Urine Drug Screen Confirmation - Urine, Clean Catch; Future  -     POC Urine Drug Screen, Triage    2. Chronic right-sided low back pain with left-sided sciatica  -     XR Spine Lumbar 2 or 3 View; Future  -     Urine Drug Screen Confirmation - Urine, Clean Catch; Future  -     POC Urine Drug Screen, Triage        - " Baseline urine drug screen was obtained.  - Pertinent imaging reviewed.   - Lumbar x-ray ordered per patient request to further evaluate after surgery.   - Pertinent labs reviewed.   - Release of information obtained for previous pain management.   - I think it is reasonable to continue Percocet and Tizanidine at this time since benefit seems to be outweighing the risks.   - May be a candidate for spinal cord stimulator once he has recovered from surgery.   - Advised to follow-up with surgeon and reassured that I do not think Dr. Chan caused the curve in his spine.  I showed Mr. Leonard images from February (prior to surgery) with a scoliotic curve.     --- Follow-up 2 weeks for office visit/review of records           NEY REPORT    NEY report has been reviewed and scanned into the patient's chart.    As the clinician, I personally reviewed the NEY from 11/5/2020 while the patient was in the office today.    While examining this patient, I wore protective equipment including a mask, eye shield and gloves.  I washed my hands before and after this patient encounter.  The patient wore a mask throughout the visit as well.     Anum Bhatt MD  Pain Management

## 2020-11-03 ENCOUNTER — TELEPHONE (OUTPATIENT)
Dept: FAMILY MEDICINE CLINIC | Facility: CLINIC | Age: 80
End: 2020-11-03

## 2020-11-03 RX ORDER — FLUOXETINE HYDROCHLORIDE 20 MG/1
20 CAPSULE ORAL DAILY
Qty: 90 CAPSULE | Refills: 1 | Status: SHIPPED | OUTPATIENT
Start: 2020-11-03 | End: 2021-03-01

## 2020-11-03 NOTE — TELEPHONE ENCOUNTER
PATIENT IS CALLING TO GET A REFILL OF:    FLUoxetine (PROzac) 20 MG capsule     HE IS COMPLETE OUT.    HE WOULD LIKE THIS MEDICATION SENT TO [facility].    SHANNAN #: [phone]

## 2020-11-05 ENCOUNTER — OFFICE VISIT (OUTPATIENT)
Dept: PAIN MEDICINE | Facility: CLINIC | Age: 80
End: 2020-11-05

## 2020-11-05 ENCOUNTER — HOSPITAL ENCOUNTER (OUTPATIENT)
Dept: GENERAL RADIOLOGY | Facility: HOSPITAL | Age: 80
Discharge: HOME | End: 2020-11-05
Admitting: ANESTHESIOLOGY

## 2020-11-05 VITALS
HEART RATE: 75 BPM | BODY MASS INDEX: 22.57 KG/M2 | TEMPERATURE: 96.8 F | HEIGHT: 71 IN | SYSTOLIC BLOOD PRESSURE: 124 MMHG | DIASTOLIC BLOOD PRESSURE: 74 MMHG | RESPIRATION RATE: 18 BRPM | OXYGEN SATURATION: 93 % | WEIGHT: 161.2 LBS

## 2020-11-05 DIAGNOSIS — M54.42 CHRONIC RIGHT-SIDED LOW BACK PAIN WITH LEFT-SIDED SCIATICA: ICD-10-CM

## 2020-11-05 DIAGNOSIS — Z98.890 HISTORY OF BACK SURGERY: ICD-10-CM

## 2020-11-05 DIAGNOSIS — G89.29 CHRONIC RIGHT-SIDED LOW BACK PAIN WITH LEFT-SIDED SCIATICA: ICD-10-CM

## 2020-11-05 DIAGNOSIS — Z98.890 HISTORY OF BACK SURGERY: Primary | ICD-10-CM

## 2020-11-05 LAB
POC AMPHETAMINES: NEGATIVE
POC BARBITURATES: NEGATIVE
POC BENZODIAZEPHINES: NEGATIVE
POC COCAINE: NEGATIVE
POC METHADONE: NEGATIVE
POC METHAMPHETAMINE SCREEN URINE: NEGATIVE
POC OPIATES: NEGATIVE
POC OXYCODONE: POSITIVE
POC PHENCYCLIDINE: NEGATIVE
POC PROPOXYPHENE: NEGATIVE
POC THC: NEGATIVE
POC TRICYCLIC ANTIDEPRESSANTS: NEGATIVE

## 2020-11-05 PROCEDURE — 72100 X-RAY EXAM L-S SPINE 2/3 VWS: CPT

## 2020-11-05 PROCEDURE — 80305 DRUG TEST PRSMV DIR OPT OBS: CPT | Performed by: ANESTHESIOLOGY

## 2020-11-05 PROCEDURE — 99204 OFFICE O/P NEW MOD 45 MIN: CPT | Performed by: ANESTHESIOLOGY

## 2020-11-10 ENCOUNTER — RESULTS ENCOUNTER (OUTPATIENT)
Dept: PAIN MEDICINE | Facility: CLINIC | Age: 80
End: 2020-11-10

## 2020-11-10 DIAGNOSIS — Z98.890 HISTORY OF BACK SURGERY: ICD-10-CM

## 2020-11-10 DIAGNOSIS — M54.42 CHRONIC RIGHT-SIDED LOW BACK PAIN WITH LEFT-SIDED SCIATICA: ICD-10-CM

## 2020-11-10 DIAGNOSIS — G89.29 CHRONIC RIGHT-SIDED LOW BACK PAIN WITH LEFT-SIDED SCIATICA: ICD-10-CM

## 2020-11-17 NOTE — PROGRESS NOTES
The patient has a pain history of the following:  Chronic low back pain  Lumbar spondylosis   Lumbar disc disease   Scoliosis   Lumbar stenosis      Previous interventions that the patient has received include:   Epidural steroid injections  Possible other interventions      Pain medications include:  Gabapentin  Percocet 7.5-325mg   Requip  Tizanidine     Other conservative modalities which the patient reports using include:  Physical Therapy: yes  Neck or back surgery: yes  Past pain management: yes     Past Significant Surgical History:  L2-4 microlumbar laminectomy 9/30/2020  Lumbar surgery 1985    HPI:     CHIEF COMPLAINT Back Pain  F/U BACK PAIN- PATIENT STATES THAT HIS PAIN HAS REMAINED THE SAME SINCE HIS LAST VISIT. HE STATES THAT IT FLUCTUATES.     Subjective   Tony Leonard is a 80 y.o. male  who presents for follow-up.  He has a history of recent back surgery with Dr. Chan on 9/30/2020.  Since his surgery he's been taking Percocet and Tizanidine for pain.  He takes 2 pills per day without side effects.  At his last visit I ordered a lumbar x-ray and obtained a release of information for his previous pain management.     He continues to have aching in his low back and the pain in his left foot has also been bothering him a lot.  The pain in his foot happens frequently when he is seated.      He continues to take Percocet 7.5-325mg and Tizanidine twice daily and feels they really help with his pain.  He denies side effects and thinks they help him function - walking and standing.         PEG Assessment   What number best describes your pain on average in the past week?5  What number best describes how, during the past week, pain has interfered with your enjoyment of life?5  What number best describes how, during the past week, pain has interfered with your general activity?  5      Opioid Risk Tool:       Opioid Risk Tool:  Family history of substance abuse: Alcohol - No  Illegal drug -  No  Prescription drugs - No   Personal history of substance abuse: Alcohol - No  Illegal drugs - No  Prescription drugs - No   Patient is between 16 and 45 years of age: No   History of preadolescent sexual abuse: N/A   Psychological disease: ADD/OCD/Bipolar/Schizophrenia - Yes  Depression - No           Opioid Risk: 2       Scorin - 3 = Low Risk    4 - 7 = Moderate Risk    8+ = High Risk          REVIEW OF PERTINENT MEDICAL DATA  2020XR SPINE LUMBAR 2 OR 3 VW-     INDICATIONS: Pain     TECHNIQUE: 3 views of the lumbar spine     COMPARISON: Correlated with CT from 2020     FINDINGS:     Anterior wedging at T12, L1 vertebral bodies does not appear  significantly changed. No new fractures are noted. Mild anterolisthesis  of L4 on L5. Moderate listhesis of L1 on L2. Multilevel endplate  spurring, disc space narrowing, facet arthropathy. Thoracolumbar  levoscoliosis is seen. If further evaluation MRI could be considered.  Arterial calcifications are present. On the frontal view, to the left of  the lower thoracic spine, curvilinear density is indeterminate, could  for example be an object outside the patient, correlate clinically.     IMPRESSION:     As described.     This report was finalized on 2020 12:27 PM by Dr. Tj Evans M.D.      The following portions of the patient's history were reviewed and updated as appropriate: allergies, current medications, past family history, past medical history, past social history, past surgical history and problem list.    Review of Systems   Constitutional: Positive for activity change (DECREASED) and fatigue. Negative for chills and fever.   HENT: Negative for congestion.    Eyes: Negative for visual disturbance.   Respiratory: Negative for chest tightness and shortness of breath.    Cardiovascular: Negative for chest pain.   Gastrointestinal: Positive for abdominal pain and constipation. Negative for diarrhea.   Genitourinary: Positive for  "dysuria. Negative for difficulty urinating and enuresis.   Musculoskeletal: Positive for back pain.   Neurological: Positive for weakness (GENERALIZED), numbness (FINGERS ON RIGHT HAND) and headaches. Negative for dizziness and light-headedness.   Psychiatric/Behavioral: Positive for agitation and sleep disturbance. The patient is nervous/anxious.      I have reviewed and confirmed the accuracy of the ROS as documented by the MA/LPN/RN Anum Bhatt MD    Vitals:    11/18/20 0844   BP: 157/74   Pulse: 67   Resp: 16   Temp: 96.8 °F (36 °C)   SpO2: 93%   Weight: 74.4 kg (164 lb)   Height: 180.3 cm (71\")   PainSc:   4   PainLoc: Back         Objective   Physical Exam  Vitals signs reviewed.   Constitutional:       General: He is not in acute distress.  Cardiovascular:      Rate and Rhythm: Normal rate.      Pulses: Normal pulses.      Comments: Bilateral lower extremity edema.  Socks have caused quite an indentation.  Negative tenderness or redness or warmth.   Pulmonary:      Effort: Pulmonary effort is normal. No respiratory distress.   Skin:     General: Skin is warm and dry.   Neurological:      General: No focal deficit present.      Mental Status: He is alert.   Psychiatric:         Mood and Affect: Mood normal.         Thought Content: Thought content normal.           Assessment/Plan   Diagnoses and all orders for this visit:    1. History of back surgery (Primary)  -     Ambulatory Referral to Orthopedic Surgery  -     oxyCODONE-acetaminophen (Percocet) 7.5-325 MG per tablet; Take 1 tablet by mouth Every 8 (Eight) Hours As Needed for Severe Pain . 1 month prescription  Dispense: 60 tablet; Refill: 0  -     TiZANidine (ZANAFLEX) 6 MG capsule; Take 1 capsule by mouth 3 (Three) Times a Day As Needed for Muscle Spasms.  Dispense: 90 capsule; Refill: 3    2. Chronic right-sided low back pain with left-sided sciatica  -     Ambulatory Referral to Orthopedic Surgery  -     oxyCODONE-acetaminophen (Percocet) 7.5-325 " MG per tablet; Take 1 tablet by mouth Every 8 (Eight) Hours As Needed for Severe Pain . 1 month prescription  Dispense: 60 tablet; Refill: 0    3. Muscle spasm  -     TiZANidine (ZANAFLEX) 6 MG capsule; Take 1 capsule by mouth 3 (Three) Times a Day As Needed for Muscle Spasms.  Dispense: 90 capsule; Refill: 3    4. Scoliosis of lumbar spine, unspecified scoliosis type  -     oxyCODONE-acetaminophen (Percocet) 7.5-325 MG per tablet; Take 1 tablet by mouth Every 8 (Eight) Hours As Needed for Severe Pain . 1 month prescription  Dispense: 60 tablet; Refill: 0    5. Osteoarthritis of lumbar spine, unspecified spinal osteoarthritis complication status  -     oxyCODONE-acetaminophen (Percocet) 7.5-325 MG per tablet; Take 1 tablet by mouth Every 8 (Eight) Hours As Needed for Severe Pain . 1 month prescription  Dispense: 60 tablet; Refill: 0    6. Lumbar radiculopathy  -     oxyCODONE-acetaminophen (Percocet) 7.5-325 MG per tablet; Take 1 tablet by mouth Every 8 (Eight) Hours As Needed for Severe Pain . 1 month prescription  Dispense: 60 tablet; Refill: 0      - Reviewed lumbar x-ray with the patient.   - Referral placed to orthopedic surgery for recommendations on pain after surgery/if he needs another operation.  He prefers to not follow-up with Dr. Chan.   - Opioid risk assessment performed today showing low risk.   - Controlled substance agreement explained and signed for opioids.   - Prescriptions provided for percocet 7.5-325mg #60/month & Tizanidine #60mg.  Patient appears stable with current regimen. No adverse effects. Regarding continuation of opioids, there is no evidence of aberrant behavior or any red flags.  The patient continues with appropriate response to opioid therapy. ADL's remain intact by self.   - Briefly discussed spinal cord stimulator in the future versus injections.  Will allow time for him to heal after his surgery before considering these options.     --- Follow-up 1 month office visit.             NEY REPORT    As part of the patient's treatment plan, I am prescribing controlled substances. The patient has been made aware of appropriate use of such medications, including potential risk of somnolence, limited ability to drive and/or work safely, and the potential for dependence or overdose. It has also bee made clear that these medications are for use by this patient only, without concomitant use of alcohol or other substances unless prescribed.     Patient has completed prescribing agreement detailing terms of continued prescribing of controlled substances, including monitoring NEY reports, urine drug screening, and pill counts if necessary. The patient is aware that inappropriate use will results in cessation of prescribing such medications.    NEY report has been reviewed and scanned into the patient's chart.    As the clinician, I personally reviewed the NEY from 11/18/2020 .    History and physical exam exhibit continued safe and appropriate use of controlled substances.    While examining this patient, I wore protective equipment including a mask, eye shield and gloves.  I washed my hands before and after this patient encounter.  The patient wore a mask throughout the visit as well.     Anum Bhatt MD  Pain Management

## 2020-11-18 ENCOUNTER — OFFICE VISIT (OUTPATIENT)
Dept: PAIN MEDICINE | Facility: CLINIC | Age: 80
End: 2020-11-18

## 2020-11-18 VITALS
SYSTOLIC BLOOD PRESSURE: 157 MMHG | HEART RATE: 67 BPM | DIASTOLIC BLOOD PRESSURE: 74 MMHG | TEMPERATURE: 96.8 F | HEIGHT: 71 IN | BODY MASS INDEX: 22.96 KG/M2 | WEIGHT: 164 LBS | RESPIRATION RATE: 16 BRPM | OXYGEN SATURATION: 93 %

## 2020-11-18 DIAGNOSIS — Z98.890 HISTORY OF BACK SURGERY: Primary | ICD-10-CM

## 2020-11-18 DIAGNOSIS — M41.9 SCOLIOSIS OF LUMBAR SPINE, UNSPECIFIED SCOLIOSIS TYPE: ICD-10-CM

## 2020-11-18 DIAGNOSIS — M62.838 MUSCLE SPASM: ICD-10-CM

## 2020-11-18 DIAGNOSIS — M54.16 LUMBAR RADICULOPATHY: ICD-10-CM

## 2020-11-18 DIAGNOSIS — M54.42 CHRONIC RIGHT-SIDED LOW BACK PAIN WITH LEFT-SIDED SCIATICA: ICD-10-CM

## 2020-11-18 DIAGNOSIS — G89.29 CHRONIC RIGHT-SIDED LOW BACK PAIN WITH LEFT-SIDED SCIATICA: ICD-10-CM

## 2020-11-18 DIAGNOSIS — M47.816 OSTEOARTHRITIS OF LUMBAR SPINE, UNSPECIFIED SPINAL OSTEOARTHRITIS COMPLICATION STATUS: ICD-10-CM

## 2020-11-18 PROCEDURE — 99214 OFFICE O/P EST MOD 30 MIN: CPT | Performed by: ANESTHESIOLOGY

## 2020-11-18 RX ORDER — TIZANIDINE HYDROCHLORIDE 6 MG/1
6 CAPSULE, GELATIN COATED ORAL 3 TIMES DAILY PRN
Qty: 90 CAPSULE | Refills: 3 | Status: SHIPPED | OUTPATIENT
Start: 2020-11-18 | End: 2021-05-04

## 2020-11-18 RX ORDER — OXYCODONE AND ACETAMINOPHEN 7.5; 325 MG/1; MG/1
1 TABLET ORAL EVERY 8 HOURS PRN
Qty: 60 TABLET | Refills: 0 | Status: SHIPPED | OUTPATIENT
Start: 2020-11-18 | End: 2020-12-17 | Stop reason: SDUPTHER

## 2020-12-04 ENCOUNTER — TELEPHONE (OUTPATIENT)
Dept: FAMILY MEDICINE CLINIC | Facility: CLINIC | Age: 80
End: 2020-12-04

## 2020-12-04 NOTE — TELEPHONE ENCOUNTER
Caller: Tnoy Leonard    Relationship to patient: self    Best call back number: [phone]     Chief complaint: Patient is calling to state he has foot spurs and is requesting a list of available providers be mailed to him.      Please advise.

## 2020-12-17 ENCOUNTER — TELEPHONE (OUTPATIENT)
Dept: FAMILY MEDICINE CLINIC | Facility: CLINIC | Age: 80
End: 2020-12-17

## 2020-12-17 DIAGNOSIS — Z98.890 HISTORY OF BACK SURGERY: ICD-10-CM

## 2020-12-17 DIAGNOSIS — M41.9 SCOLIOSIS OF LUMBAR SPINE, UNSPECIFIED SCOLIOSIS TYPE: ICD-10-CM

## 2020-12-17 DIAGNOSIS — M47.816 OSTEOARTHRITIS OF LUMBAR SPINE, UNSPECIFIED SPINAL OSTEOARTHRITIS COMPLICATION STATUS: ICD-10-CM

## 2020-12-17 DIAGNOSIS — M54.42 CHRONIC RIGHT-SIDED LOW BACK PAIN WITH LEFT-SIDED SCIATICA: ICD-10-CM

## 2020-12-17 DIAGNOSIS — G89.29 CHRONIC RIGHT-SIDED LOW BACK PAIN WITH LEFT-SIDED SCIATICA: ICD-10-CM

## 2020-12-17 DIAGNOSIS — M54.16 LUMBAR RADICULOPATHY: ICD-10-CM

## 2020-12-17 RX ORDER — OXYCODONE AND ACETAMINOPHEN 7.5; 325 MG/1; MG/1
1 TABLET ORAL EVERY 8 HOURS PRN
Qty: 60 TABLET | Refills: 0 | Status: CANCELLED | OUTPATIENT
Start: 2020-12-17

## 2020-12-17 RX ORDER — OXYCODONE AND ACETAMINOPHEN 7.5; 325 MG/1; MG/1
1 TABLET ORAL EVERY 8 HOURS PRN
Qty: 60 TABLET | Refills: 0 | Status: SHIPPED | OUTPATIENT
Start: 2020-12-17 | End: 2021-03-02 | Stop reason: SDUPTHER

## 2020-12-17 NOTE — TELEPHONE ENCOUNTER
Caller: Horacio Tony OSCAR    Relationship: Self    Best call back number: [phone]    Medication needed:   Requested Prescriptions     Pending Prescriptions Disp Refills   • oxyCODONE-acetaminophen (Percocet) 7.5-325 MG per tablet 60 tablet 0     Sig: Take 1 tablet by mouth Every 8 (Eight) Hours As Needed for Severe Pain . 1 month prescription       When do you need the refill by: 12/17    What details did the patient provide when requesting the medication: PATIENT HAS ONE PILL    Does the patient have less than a 3 day supply:  [x] Yes  [] No    What is the patient's preferred pharmacy: [address] AT [facility] & ([facility] [phone] [facility] [phone] FX

## 2020-12-22 ENCOUNTER — OFFICE VISIT (OUTPATIENT)
Dept: PAIN MEDICINE | Facility: CLINIC | Age: 80
End: 2020-12-22

## 2020-12-22 VITALS
SYSTOLIC BLOOD PRESSURE: 99 MMHG | HEART RATE: 74 BPM | OXYGEN SATURATION: 95 % | TEMPERATURE: 96 F | RESPIRATION RATE: 16 BRPM | HEIGHT: 71 IN | BODY MASS INDEX: 23.1 KG/M2 | WEIGHT: 165 LBS | DIASTOLIC BLOOD PRESSURE: 58 MMHG

## 2020-12-22 DIAGNOSIS — G89.29 CHRONIC RIGHT-SIDED LOW BACK PAIN WITH LEFT-SIDED SCIATICA: ICD-10-CM

## 2020-12-22 DIAGNOSIS — M41.9 SCOLIOSIS OF LUMBAR SPINE, UNSPECIFIED SCOLIOSIS TYPE: ICD-10-CM

## 2020-12-22 DIAGNOSIS — M62.838 MUSCLE SPASM: ICD-10-CM

## 2020-12-22 DIAGNOSIS — Z98.890 HISTORY OF BACK SURGERY: Primary | ICD-10-CM

## 2020-12-22 DIAGNOSIS — M54.16 LUMBAR RADICULOPATHY: ICD-10-CM

## 2020-12-22 DIAGNOSIS — M47.816 OSTEOARTHRITIS OF LUMBAR SPINE, UNSPECIFIED SPINAL OSTEOARTHRITIS COMPLICATION STATUS: ICD-10-CM

## 2020-12-22 DIAGNOSIS — M54.42 CHRONIC RIGHT-SIDED LOW BACK PAIN WITH LEFT-SIDED SCIATICA: ICD-10-CM

## 2020-12-22 PROCEDURE — 99442 PR PHYS/QHP TELEPHONE EVALUATION 11-20 MIN: CPT | Performed by: ANESTHESIOLOGY

## 2020-12-22 NOTE — PROGRESS NOTES
The patient has a pain history of the following:  Chronic low back pain  Lumbar spondylosis   Lumbar disc disease   Scoliosis   Lumbar stenosis      Previous interventions that the patient has received include:   Epidural steroid injections  Possible other interventions      Pain medications include:  Gabapentin  Percocet 7.5-325mg BID  Requip  Tizanidine     Other conservative modalities which the patient reports using include:  Physical Therapy: yes  Neck or back surgery: yes  Past pain management: yes     Past Significant Surgical History:  L2-4 microlumbar laminectomy 9/30/2020  Lumbar surgery 1985    HPI:     CHIEF COMPLAINT Back Pain  F/U back pain- patient states that his pain has remained the same since his last visit.     Subjective   Tony Leonard is a 80 y.o. male  who presents for follow-up.  He has a history of recent back surgery with Dr. Chan on 9/30/2020.  Since his surgery he's been taking Percocet and Tizanidine for pain.  He takes 2 pills per day without side effects.  At his last visit I referred him to orthopedic surgery for surgical opinion on his back and refilled his percocet and tizanidine.     He continues to have back pain without change and the left leg pain may have improved slightly.  Today his pain is 4/10.  He continues to take Percocet and tizanidine twice daily without any side effects (previously reported constipation is controlled with current bowel regimen).    He still takes either gabapentin or Requip at night - he feels like they do the same thing.  He asks if I can increase his percocet prescription to 10-325mg today to help him control his pain more.       PEG Assessment   What number best describes your pain on average in the past week?5  What number best describes how, during the past week, pain has interfered with your enjoyment of life?4  What number best describes how, during the past week, pain has interfered with your general activity?  4    REVIEW OF PERTINENT  "MEDICAL DATA  No new     The following portions of the patient's history were reviewed and updated as appropriate: allergies, current medications, past family history, past medical history, past social history, past surgical history and problem list.    Review of Systems   Constitutional: Positive for fatigue. Negative for activity change, chills and fever.   HENT: Negative for congestion.    Eyes: Negative for visual disturbance.   Respiratory: Negative for chest tightness and shortness of breath.    Cardiovascular: Negative for chest pain.   Gastrointestinal: Positive for abdominal pain and constipation. Negative for diarrhea.   Genitourinary: Positive for frequency. Negative for difficulty urinating and dysuria.   Musculoskeletal: Positive for back pain.   Neurological: Positive for weakness (changing positions) and numbness (fingers). Negative for dizziness, light-headedness and headaches.   Psychiatric/Behavioral: Positive for agitation. Negative for sleep disturbance. The patient is nervous/anxious.      I have reviewed and confirmed the accuracy of the ROS as documented by the MA/LPN/RN Anum Bhatt MD    Vitals:    12/22/20 0925   BP: 99/58   Pulse: 74   Resp: 16   Temp: 96 °F (35.6 °C)   SpO2: 95%   Weight: 74.8 kg (165 lb)   Height: 180.3 cm (71\")   PainSc:   4   PainLoc: Back         Objective   Physical Exam  Vitals signs reviewed.   Constitutional:       General: He is not in acute distress.  HENT:      Head: Normocephalic.   Cardiovascular:      Rate and Rhythm: Normal rate.   Pulmonary:      Effort: Pulmonary effort is normal. No respiratory distress.   Musculoskeletal:      Comments: Healing midline lumbar incision.  Negative tenderness to palpation.  Scoliotic curve present.    Skin:     General: Skin is warm and dry.   Neurological:      General: No focal deficit present.      Mental Status: He is alert.         Controlled Substance Monitoring:     Controlled Substance Agreement Signed: " Yes  Date Signed: 11/18/2020  Provider: Anum Bhatt MD  Last NEY Report Reviewed: 12/22/2020  Appropriate for ongoing care?: Yes  Opioid Risk Category: Low    MME/day: 15    Last drug screen performed on:  11/5/2020 and was appropriate    Patient denies a history of substance/chemical abuse.      Assessment/Plan   Diagnoses and all orders for this visit:    1. History of back surgery (Primary)    2. Chronic right-sided low back pain with left-sided sciatica    3. Muscle spasm    4. Scoliosis of lumbar spine, unspecified scoliosis type    5. Osteoarthritis of lumbar spine, unspecified spinal osteoarthritis complication status    6. Lumbar radiculopathy      - Does not need oxycodone refill as he asked Dr. Aviles to fill this medication and it was sent on 12/17/2020.  Mr. Leonard thinks he picked up his prescription already.  Patient appears stable with current regimen. No adverse effects. Regarding continuation of opioids, there is no evidence of aberrant behavior or any red flags.  The patient continues with appropriate response to opioid therapy. ADL's remain intact by self.   - I explained to Mr. Leonard that his prescription pain medication must come only from me, or through Dr. Aviles, but not both.  He understands.   - I have asked him to make sure Dr. Gordon thinks he is healing okay and for surgical advice.  Also if it's okay to proceed with Medial branch blocks and possible Radiofrequency ablation in the lower lumbar spine.  - Discussed Medial branch blocks and Radiofrequency ablation for treatment of his low back pain.  We will further consider this treatment at his next visit once he has established care with a surgeon.   - At least 9 minutes of the 16 minute visit were spent discussing his treatment options and plan of care as documented above.     --- Follow-up ~3 weeks office visit.            NEY REPORT    As part of the patient's treatment plan, I am prescribing controlled substances. The  patient has been made aware of appropriate use of such medications, including potential risk of somnolence, limited ability to drive and/or work safely, and the potential for dependence or overdose. It has also bee made clear that these medications are for use by this patient only, without concomitant use of alcohol or other substances unless prescribed.     Patient has completed prescribing agreement detailing terms of continued prescribing of controlled substances, including monitoring NEY reports, urine drug screening, and pill counts if necessary. The patient is aware that inappropriate use will results in cessation of prescribing such medications.    NEY report has been reviewed and scanned into the patient's chart.    As the clinician, I personally reviewed the NEY from 12/22/2020 .    History and physical exam exhibit continued safe and appropriate use of controlled substances.    While examining this patient, I wore protective equipment including a mask, eye shield and gloves.  I washed my hands before and after this patient encounter.  The patient wore a mask throughout the visit as well.     Anum Bhatt MD  Pain Management

## 2021-01-04 RX ORDER — METHYLPHENIDATE HYDROCHLORIDE 10 MG/1
10 TABLET ORAL DAILY
Qty: 90 TABLET | Refills: 0 | Status: CANCELLED | OUTPATIENT
Start: 2021-01-04

## 2021-01-04 RX ORDER — METHYLPHENIDATE HYDROCHLORIDE 10 MG/1
10 TABLET ORAL DAILY
Qty: 30 TABLET | Refills: 0 | Status: SHIPPED | OUTPATIENT
Start: 2021-01-04 | End: 2021-01-05 | Stop reason: SDUPTHER

## 2021-01-04 RX ORDER — METHYLPHENIDATE HYDROCHLORIDE 10 MG/1
10 TABLET ORAL DAILY
Qty: 30 TABLET | Refills: 0 | Status: SHIPPED | OUTPATIENT
Start: 2021-01-04 | End: 2021-01-04 | Stop reason: SDUPTHER

## 2021-01-04 NOTE — TELEPHONE ENCOUNTER
Caller: Tony Leonard    Relationship: Self    Best call back number:     Medication needed:   Requested Prescriptions     Pending Prescriptions Disp Refills   • methylphenidate (Ritalin) 10 MG tablet 90 tablet 0     Sig: Take 1 tablet by mouth Daily. For ADD 3 months         Does the patient have less than a 3 day supply:  [x] Yes  [] No    What is the patient's preferred pharmacy: [address] AT [facility] & ([facility] [phone] [facility] [phone] FX

## 2021-01-05 RX ORDER — METHYLPHENIDATE HYDROCHLORIDE 10 MG/1
10 TABLET ORAL DAILY
Qty: 30 TABLET | Refills: 0 | Status: SHIPPED | OUTPATIENT
Start: 2021-01-05 | End: 2021-01-12 | Stop reason: SDUPTHER

## 2021-01-12 RX ORDER — METHYLPHENIDATE HYDROCHLORIDE 10 MG/1
10 TABLET ORAL DAILY
Qty: 30 TABLET | Refills: 0 | Status: SHIPPED | OUTPATIENT
Start: 2021-01-12 | End: 2021-01-14 | Stop reason: SDUPTHER

## 2021-01-12 NOTE — TELEPHONE ENCOUNTER
Caller: Tony Leonard    Relationship: Self    Best call back number: [phone]    Medication needed:   methylphenidate (Ritalin) 10 MG tablet    When do you need the refill by:ASAP    What details did the patient provide when requesting the medication: PATIENT IS OUT OF MEDICATION    Does the patient have less than a 3 day supply:  [x] Yes  [] No    What is the patient's preferred pharmacy:    [address] [facility] [phone] [facility] [phone]

## 2021-01-14 ENCOUNTER — OFFICE VISIT (OUTPATIENT)
Dept: FAMILY MEDICINE CLINIC | Facility: CLINIC | Age: 81
End: 2021-01-14

## 2021-01-14 VITALS
DIASTOLIC BLOOD PRESSURE: 52 MMHG | OXYGEN SATURATION: 96 % | HEIGHT: 71 IN | HEART RATE: 63 BPM | SYSTOLIC BLOOD PRESSURE: 100 MMHG | WEIGHT: 168.2 LBS | BODY MASS INDEX: 23.55 KG/M2 | TEMPERATURE: 98 F

## 2021-01-14 DIAGNOSIS — F90.0 ATTENTION DEFICIT HYPERACTIVITY DISORDER (ADHD), PREDOMINANTLY INATTENTIVE TYPE: Primary | ICD-10-CM

## 2021-01-14 DIAGNOSIS — J44.9 CHRONIC OBSTRUCTIVE PULMONARY DISEASE, UNSPECIFIED COPD TYPE (HCC): ICD-10-CM

## 2021-01-14 PROCEDURE — 99213 OFFICE O/P EST LOW 20 MIN: CPT | Performed by: INTERNAL MEDICINE

## 2021-01-14 RX ORDER — METHYLPHENIDATE HYDROCHLORIDE 10 MG/1
10 TABLET ORAL DAILY
Qty: 30 TABLET | Refills: 0 | Status: SHIPPED | OUTPATIENT
Start: 2021-01-14 | End: 2021-01-27 | Stop reason: SDUPTHER

## 2021-01-14 NOTE — PROGRESS NOTES
Subjective   Tony Leonard is a 80 y.o. male.     Vitals:    01/14/21 1607   BP: 100/52   Pulse: 63   Temp: 98 °F (36.7 °C)   SpO2: 96%      Body mass index is 23.46 kg/m².     History of Present Illness   Patient was seen for ADHD.  Patient had Ritalin refilled.  Patient's COPD has been treated with bronchodilators.  Patient has been stable with these medications.    The patient has read and signed the [facility] Controlled Substance Contract.  I will continue to see patient for regular follow up appointments.  They are well controlled on their medication.  NEY has been reviewed by me and is updated every 3 months. The patient is aware of the potential for addiction and dependence.      Dictated utilizing Dragon dictation. If there are questions or for further clarification, please contact me.  The following portions of the patient's history were reviewed and updated as appropriate: allergies, current medications, past family history, past medical history, past social history, past surgical history and problem list.    Review of Systems   Constitutional: Negative for fatigue and fever.   HENT: Positive for congestion. Negative for trouble swallowing.    Eyes: Negative for discharge and visual disturbance.   Respiratory: Negative for choking and shortness of breath.    Cardiovascular: Negative for chest pain and palpitations.   Gastrointestinal: Negative for abdominal pain and blood in stool.   Endocrine: Negative.    Genitourinary: Negative for genital sores and hematuria.   Musculoskeletal: Negative for gait problem and joint swelling.   Skin: Negative for color change, pallor, rash and wound.   Allergic/Immunologic: Positive for environmental allergies. Negative for immunocompromised state.   Neurological: Negative for facial asymmetry and speech difficulty.   Psychiatric/Behavioral: Positive for decreased concentration. Negative for hallucinations and suicidal ideas. The patient is nervous/anxious.         Objective   Physical Exam  Vitals signs and nursing note reviewed.   Constitutional:       Appearance: Normal appearance. He is well-developed.   HENT:      Head: Normocephalic and atraumatic.      Nose: Nose normal.      Mouth/Throat:      Mouth: Mucous membranes are moist.      Pharynx: Oropharynx is clear.   Eyes:      Extraocular Movements: Extraocular movements intact.      Conjunctiva/sclera: Conjunctivae normal.      Pupils: Pupils are equal, round, and reactive to light.   Neck:      Musculoskeletal: Normal range of motion and neck supple.   Cardiovascular:      Rate and Rhythm: Normal rate and regular rhythm.      Heart sounds: Normal heart sounds. No murmur. No friction rub. No gallop.    Pulmonary:      Effort: Pulmonary effort is normal. No respiratory distress.      Breath sounds: Normal breath sounds. No stridor. No wheezing, rhonchi or rales.   Chest:      Chest wall: No tenderness.   Abdominal:      General: Bowel sounds are normal.      Palpations: Abdomen is soft.   Musculoskeletal: Normal range of motion.   Skin:     General: Skin is warm and dry.   Neurological:      General: No focal deficit present.      Mental Status: He is alert and oriented to person, place, and time. Mental status is at baseline.   Psychiatric:         Behavior: Behavior normal.         Thought Content: Thought content normal.         Judgment: Judgment normal.      Comments: Patient has ADHD controlled with Ritalin         Assessment/Plan #1 refill Ritalin #2 continue bronchodilators  Problems Addressed this Visit     Mental Health              ADD (attention deficit disorder) - Primary    Relevant Medications    methylphenidate (Ritalin) 10 MG tablet          Pulmonary and Pneumonias              COPD (chronic obstructive pulmonary disease) (CMS/[facility])            Diagnoses     Diagnosis Codes Comments    Attention deficit hyperactivity disorder (ADHD), predominantly inattentive type    -  Primary ICD-10-CM:  F90.0  ICD-9-CM: 314.00     Chronic obstructive pulmonary disease, unspecified COPD type (CMS/[facility])     ICD-10-CM: J44.9  ICD-9-CM: 496

## 2021-01-15 DIAGNOSIS — E55.9 VITAMIN D DEFICIENCY, UNSPECIFIED: ICD-10-CM

## 2021-01-15 DIAGNOSIS — R93.3 ABNORMAL FINDINGS ON DIAGNOSTIC IMAGING OF OTHER PARTS OF DIGESTIVE TRACT: ICD-10-CM

## 2021-01-15 DIAGNOSIS — M47.816 OSTEOARTHRITIS OF LUMBAR SPINE, UNSPECIFIED SPINAL OSTEOARTHRITIS COMPLICATION STATUS: ICD-10-CM

## 2021-01-15 DIAGNOSIS — J44.9 CHRONIC OBSTRUCTIVE PULMONARY DISEASE, UNSPECIFIED COPD TYPE (HCC): Primary | ICD-10-CM

## 2021-01-15 DIAGNOSIS — M54.40 BILATERAL LOW BACK PAIN WITH SCIATICA, SCIATICA LATERALITY UNSPECIFIED, UNSPECIFIED CHRONICITY: ICD-10-CM

## 2021-01-22 ENCOUNTER — LAB (OUTPATIENT)
Dept: FAMILY MEDICINE CLINIC | Facility: CLINIC | Age: 81
End: 2021-01-22

## 2021-01-22 DIAGNOSIS — Z13.220 ENCOUNTER FOR SCREENING FOR LIPOID DISORDERS: ICD-10-CM

## 2021-01-22 DIAGNOSIS — M54.40 BILATERAL LOW BACK PAIN WITH SCIATICA, SCIATICA LATERALITY UNSPECIFIED, UNSPECIFIED CHRONICITY: ICD-10-CM

## 2021-01-22 DIAGNOSIS — Z51.81 MEDICATION MONITORING ENCOUNTER: ICD-10-CM

## 2021-01-22 DIAGNOSIS — E78.00 ELEVATED LDL CHOLESTEROL LEVEL: ICD-10-CM

## 2021-01-22 DIAGNOSIS — E55.9 VITAMIN D DEFICIENCY, UNSPECIFIED: Primary | ICD-10-CM

## 2021-01-22 DIAGNOSIS — J44.9 CHRONIC OBSTRUCTIVE PULMONARY DISEASE, UNSPECIFIED COPD TYPE (HCC): ICD-10-CM

## 2021-01-22 DIAGNOSIS — M47.816 OSTEOARTHRITIS OF LUMBAR SPINE, UNSPECIFIED SPINAL OSTEOARTHRITIS COMPLICATION STATUS: ICD-10-CM

## 2021-01-27 LAB — DRUGS UR: NORMAL

## 2021-01-27 RX ORDER — METHYLPHENIDATE HYDROCHLORIDE 10 MG/1
10 TABLET ORAL DAILY
Qty: 30 TABLET | Refills: 0 | Status: SHIPPED | OUTPATIENT
Start: 2021-01-27 | End: 2021-03-04 | Stop reason: SDUPTHER

## 2021-01-27 NOTE — TELEPHONE ENCOUNTER
Patient is requesting a refill on methylphenidate (Ritalin) 10 MG tablet    [facility] - [place], [place] - [address] AT [facility] & ([facility] - [phone] [facility] [phone] FX

## 2021-02-15 DIAGNOSIS — M54.16 LUMBAR RADICULOPATHY: ICD-10-CM

## 2021-02-15 DIAGNOSIS — M41.9 SCOLIOSIS OF LUMBAR SPINE, UNSPECIFIED SCOLIOSIS TYPE: ICD-10-CM

## 2021-02-15 DIAGNOSIS — M54.42 CHRONIC RIGHT-SIDED LOW BACK PAIN WITH LEFT-SIDED SCIATICA: ICD-10-CM

## 2021-02-15 DIAGNOSIS — Z98.890 HISTORY OF BACK SURGERY: ICD-10-CM

## 2021-02-15 DIAGNOSIS — G89.29 CHRONIC RIGHT-SIDED LOW BACK PAIN WITH LEFT-SIDED SCIATICA: ICD-10-CM

## 2021-02-15 DIAGNOSIS — M47.816 OSTEOARTHRITIS OF LUMBAR SPINE, UNSPECIFIED SPINAL OSTEOARTHRITIS COMPLICATION STATUS: ICD-10-CM

## 2021-02-15 NOTE — TELEPHONE ENCOUNTER
Caller: Horacio Tony OSCAR    Relationship: Self    Best call back number: [phone]     Medication needed:   Requested Prescriptions     Pending Prescriptions Disp Refills   • methylphenidate (Ritalin) 10 MG tablet 30 tablet 0     Sig: Take 1 tablet by mouth Daily. For ADD 3 months   • oxyCODONE-acetaminophen (Percocet) 7.5-325 MG per tablet 60 tablet 0     Sig: Take 1 tablet by mouth Every 8 (Eight) Hours As Needed for Severe Pain . 1 month prescription       When do you need the refill by: 2-15-21    What details did the patient provide when requesting the medication: PATIENT HAS 2 DAYS LEFT    Does the patient have less than a 3 day supply:  [x] Yes  [] No    What is the patient's preferred pharmacy: [facility] [address] AT [facility] & ([facility] [phone] [facility] [phone] FX

## 2021-02-16 RX ORDER — OXYCODONE AND ACETAMINOPHEN 7.5; 325 MG/1; MG/1
1 TABLET ORAL EVERY 8 HOURS PRN
Qty: 60 TABLET | Refills: 0 | OUTPATIENT
Start: 2021-02-16

## 2021-02-16 RX ORDER — METHYLPHENIDATE HYDROCHLORIDE 10 MG/1
10 TABLET ORAL DAILY
Qty: 30 TABLET | Refills: 0 | OUTPATIENT
Start: 2021-02-16

## 2021-02-19 ENCOUNTER — TELEPHONE (OUTPATIENT)
Dept: GASTROENTEROLOGY | Facility: CLINIC | Age: 81
End: 2021-02-19

## 2021-02-19 NOTE — TELEPHONE ENCOUNTER
----- Message from Cherelle Faith Rep sent at 2/19/2021 11:22 AM EST -----  Regarding: Questions  Contact: [phone]  Pt calling having blood in his stool, wants to speak to a nurse

## 2021-02-19 NOTE — TELEPHONE ENCOUNTER
Most likely he had an episode of rectal bleeding due to his hemorrhoids.  I do not think there is anything he needs to do right now.  He needs to continue to watch his bowel movements and check for any further bleeding.  If the bleeding continues and it is profuse then I do recommend he go to the ER.  However if it is just on the 12 paper when he wipes he can wait and give us an update Monday on how he is doing.  He needs to drink plenty of water and make sure that his stools are soft and that he is not straining.  If the bleeding does get worse or he develops any further symptoms I do recommend he go to the ER over the weekend.  But otherwise if it is just this 1 time I think he is okay to wait and monitor symptoms and call us back on Monday with an update.

## 2021-02-19 NOTE — TELEPHONE ENCOUNTER
Returned call to pt and pt reports that this am when he has his bm and wiped he saw bright red blood.  He states he did not see blood in the toilet or on the stool.  Pt state this was the only time he saw blood. Pt denies constipation and straining with bm. States bm was normal.   Pt states he feels fine. He is asking what should he do. ADvised will send message to Mai NP and in the meantime is symptoms worsen to go to ER.  Pt verb understanding.     Per c/s in 05/2019 pt does have hx of internal hemorrhoids

## 2021-02-24 ENCOUNTER — OFFICE VISIT (OUTPATIENT)
Dept: ORTHOPEDIC SURGERY | Facility: CLINIC | Age: 81
End: 2021-02-24

## 2021-02-24 VITALS — WEIGHT: 170 LBS | TEMPERATURE: 98.6 F | BODY MASS INDEX: 23.8 KG/M2 | HEIGHT: 71 IN

## 2021-02-24 DIAGNOSIS — M41.9 ACQUIRED SCOLIOSIS: Primary | ICD-10-CM

## 2021-02-24 PROCEDURE — 99214 OFFICE O/P EST MOD 30 MIN: CPT | Performed by: ORTHOPAEDIC SURGERY

## 2021-02-24 NOTE — PROGRESS NOTES
New patient or new problem visit    CC: Low back pain    HPI: He complains of low back pain which rarely radiates.  It is chronic.  In October of last year  The more he performed a lumbar laminectomy with little relief.  I asked if the surgery was for leg pain or back pain but he was not sure.    PFSH: See attached.  He has a history of poliomyelitis as a child and also COPD for which he is on meds.    ROS: No bowel or bladder complaints.    PE: He looks like Tao Hernandez.  He has tenderness to palpation the lumbar spine his posture appears slightly kyphotic but balanced coronally.  He exhibits good strength in lower legs bilaterally but gait is slowed.  I think he was using a cane today.    XRAY: Plain film x-rays show lumbar disc degeneration and a laminectomy from 2-4 lumbar scoliosis over multiple levels.  Overall though he appears coronally balanced slightly lumbar lower hyperlordotic.  No comparison views are available.  A CT scan from the past suggest he may have had a upper lumbar fracture at one point.  Had some lumbar stenosis at the time of the CT that presumably was remedied with a decompression.      Other: n/a    Impression: Lumbar disc degeneration lumbar scoliosis.  Some lumbar stenosis but no neurologic or leg complaints.    Plan: He would need T10 to sacral fusion and the likelihood of that giving homerun pain relief is extremely low.  I would stay away from surgery and I do not see any other great options for now as he is done physical therapy and is in pain management.  I understand Dr. Bhatt had recommended possible consideration of pain pump or stimulator and I think that either might be a good choice but I would lean towards the pump as his pain is primarily axial but I would totally defer this decision to her.  I definitely would choose minimally invasive option over aggressive surgery at this point in his life.

## 2021-03-01 RX ORDER — FLUOXETINE HYDROCHLORIDE 20 MG/1
CAPSULE ORAL
Qty: 90 CAPSULE | Refills: 3 | Status: SHIPPED | OUTPATIENT
Start: 2021-03-01 | End: 2022-01-31

## 2021-03-01 NOTE — PROGRESS NOTES
The patient has a pain history of the following:  Chronic low back pain  Lumbar spondylosis   Lumbar disc disease   Scoliosis   Lumbar stenosis      Previous interventions that the patient has received include:   Epidural steroid injections  Possible other interventions      Pain medications include:  Gabapentin  Percocet 7.5-325mg BID  Requip  Tizanidine     Other conservative modalities which the patient reports using include:  Physical Therapy: yes  Neck or back surgery: yes  Past pain management: yes     Past Significant Surgical History:  L2-4 microlumbar laminectomy 9/30/2020  Lumbar surgery 1985    HPI:     CHIEF COMPLAINT Back Pain      Subjective   Tony L Horacio is a 80 y.o. male  who presents for follow-up.  He has a history of back surgery with Dr. Chan on 9/30/2020.  Since his surgery he's been taking Percocet and Tizanidine for pain.  He takes 2 pills per day without side effects.  At his last visit I referred him to orthopedic surgery for surgical opinion on his back and refilled his percocet and tizanidine. We discussed Medial branch blocks and Radiofrequency ablation for his lumbar pain.      Since his last visit, he's been able to stop using his walker.  He states that overall, he has more good days than bad days now.  He continues to take Percocet 7.5-325mg twice a day, tizanidine as needed, and takes gabapentin/Requip when needed.  Most of his pain is in his low back; only twice a week does he get radicular pain into his leg.  He denies side effects from the medications.      He requests a prescription today for bilateral shoe inserts and a left heel lift.  He had some of these made for him approximately 10 years ago with benefit and they need to be replaced due to wear/tear (and likely need to be re-fitted due to his surgeries/aging).       PEG Assessment   What number best describes your pain on average in the past week?4  What number best describes how, during the past week, pain has  "interfered with your enjoyment of life?4  What number best describes how, during the past week, pain has interfered with your general activity?  4    REVIEW OF PERTINENT MEDICAL DATA  Dr. Gordon states surgery from T10-sacrum is unlikely to cure his pain, so he recommends conservative therapy at this time.  May consider spinal cord stimulator versus pain pump.      The following portions of the patient's history were reviewed and updated as appropriate: allergies, current medications, past family history, past medical history, past social history, past surgical history and problem list.    Review of Systems   Constitutional: Negative for fatigue.   HENT: Positive for congestion.    Eyes: Negative for visual disturbance.   Respiratory: Positive for cough, shortness of breath and wheezing.    Cardiovascular: Negative.    Gastrointestinal: Negative for constipation and diarrhea.   Genitourinary: Negative for difficulty urinating.   Musculoskeletal: Positive for back pain.   Neurological: Positive for weakness. Negative for numbness.   Psychiatric/Behavioral: Positive for sleep disturbance. Negative for suicidal ideas. The patient is nervous/anxious.      I have reviewed and confirmed the accuracy of the ROS as documented by the MA/LPN/RN Anum Bhatt MD    Vitals:    03/02/21 1022   BP: 110/62   Pulse: 66   Resp: 18   Temp: 97.8 °F (36.6 °C)   SpO2: 94%   Weight: 79 kg (174 lb 3.2 oz)   Height: 180.3 cm (71\")   PainSc:   4   PainLoc: Back         Objective   Physical Exam  Vitals signs reviewed.   Cardiovascular:      Rate and Rhythm: Normal rate.   Pulmonary:      Effort: Pulmonary effort is normal. No respiratory distress.   Musculoskeletal:         General: No tenderness.      Comments: Negative pain with facet loading.   Skin:     General: Skin is warm and dry.   Neurological:      General: No focal deficit present.      Mental Status: He is alert.   Psychiatric:         Mood and Affect: Mood normal.         " Thought Content: Thought content normal.         Controlled Substance Monitoring:     Controlled Substance Agreement Signed: Yes  Date Signed: 11/18/2020  Provider: Anum Bhatt MD  Last NEY Report Reviewed: 3/2/21  Appropriate for ongoing care?: Yes  Opioid Risk Category: Low     MME/day: <50    Last drug screen performed on:  11/5/2020 and was appropriate     Patient denies a history of substance/chemical abuse.      Assessment/Plan   Diagnoses and all orders for this visit:    1. History of back surgery (Primary)  -     Ambulatory Referral to Orthotist  -     oxyCODONE-acetaminophen (Percocet) 7.5-325 MG per tablet; Take 1 tablet by mouth Every 8 (Eight) Hours As Needed for Severe Pain . 1 month prescription  Dispense: 60 tablet; Refill: 0    2. Chronic right-sided low back pain with left-sided sciatica  -     Ambulatory Referral to Orthotist  -     oxyCODONE-acetaminophen (Percocet) 7.5-325 MG per tablet; Take 1 tablet by mouth Every 8 (Eight) Hours As Needed for Severe Pain . 1 month prescription  Dispense: 60 tablet; Refill: 0    3. Scoliosis of lumbar spine, unspecified scoliosis type  -     Ambulatory Referral to Orthotist  -     oxyCODONE-acetaminophen (Percocet) 7.5-325 MG per tablet; Take 1 tablet by mouth Every 8 (Eight) Hours As Needed for Severe Pain . 1 month prescription  Dispense: 60 tablet; Refill: 0    4. Osteoarthritis of lumbar spine, unspecified spinal osteoarthritis complication status  -     Ambulatory Referral to Orthotist  -     oxyCODONE-acetaminophen (Percocet) 7.5-325 MG per tablet; Take 1 tablet by mouth Every 8 (Eight) Hours As Needed for Severe Pain . 1 month prescription  Dispense: 60 tablet; Refill: 0    5. Lumbar radiculopathy  -     Ambulatory Referral to Orthotist  -     oxyCODONE-acetaminophen (Percocet) 7.5-325 MG per tablet; Take 1 tablet by mouth Every 8 (Eight) Hours As Needed for Severe Pain . 1 month prescription  Dispense: 60 tablet; Refill: 0    6. Pain in both  feet  -     Ambulatory Referral to Orthotist          - Referral placed to Orthotist for shoe inserts/heel lift.   - Patient appears stable with current regimen. No adverse effects. Regarding continuation of opioids, there is no evidence of aberrant behavior or any red flags.  The patient continues with appropriate response to opioid therapy. ADL's remain intact by self.   - Refill provided for Percocet 7.5-325mg #60.    - We will hold on interventions at this time since he is improving.   - He states he will start doing his own exercises at home and does not want a physical therapy referral at this time.       --- Follow-up 1 month.           NEY REPORT    As part of the patient's treatment plan, I am prescribing controlled substances. The patient has been made aware of appropriate use of such medications, including potential risk of somnolence, limited ability to drive and/or work safely, and the potential for dependence or overdose. It has also bee made clear that these medications are for use by this patient only, without concomitant use of alcohol or other substances unless prescribed.     Patient has completed prescribing agreement detailing terms of continued prescribing of controlled substances, including monitoring NEY reports, urine drug screening, and pill counts if necessary. The patient is aware that inappropriate use will results in cessation of prescribing such medications.    As the clinician, I personally reviewed the NEY from 3/2/21 .    History and physical exam exhibit continued safe and appropriate use of controlled substances.    While examining this patient, I wore protective equipment including a mask, eye shield and gloves.  I washed my hands before and after this patient encounter.  The patient wore a mask throughout the visit as well.     Anum Bhatt MD  Pain Management

## 2021-03-02 ENCOUNTER — OFFICE VISIT (OUTPATIENT)
Dept: PAIN MEDICINE | Facility: CLINIC | Age: 81
End: 2021-03-02

## 2021-03-02 VITALS
TEMPERATURE: 97.8 F | OXYGEN SATURATION: 94 % | HEIGHT: 71 IN | RESPIRATION RATE: 18 BRPM | DIASTOLIC BLOOD PRESSURE: 62 MMHG | BODY MASS INDEX: 24.39 KG/M2 | HEART RATE: 66 BPM | WEIGHT: 174.2 LBS | SYSTOLIC BLOOD PRESSURE: 110 MMHG

## 2021-03-02 DIAGNOSIS — M41.9 SCOLIOSIS OF LUMBAR SPINE, UNSPECIFIED SCOLIOSIS TYPE: ICD-10-CM

## 2021-03-02 DIAGNOSIS — M47.816 OSTEOARTHRITIS OF LUMBAR SPINE, UNSPECIFIED SPINAL OSTEOARTHRITIS COMPLICATION STATUS: ICD-10-CM

## 2021-03-02 DIAGNOSIS — G89.29 CHRONIC RIGHT-SIDED LOW BACK PAIN WITH LEFT-SIDED SCIATICA: ICD-10-CM

## 2021-03-02 DIAGNOSIS — M54.42 CHRONIC RIGHT-SIDED LOW BACK PAIN WITH LEFT-SIDED SCIATICA: ICD-10-CM

## 2021-03-02 DIAGNOSIS — M79.671 PAIN IN BOTH FEET: ICD-10-CM

## 2021-03-02 DIAGNOSIS — M54.16 LUMBAR RADICULOPATHY: ICD-10-CM

## 2021-03-02 DIAGNOSIS — Z98.890 HISTORY OF BACK SURGERY: Primary | ICD-10-CM

## 2021-03-02 DIAGNOSIS — M79.672 PAIN IN BOTH FEET: ICD-10-CM

## 2021-03-02 PROCEDURE — 99214 OFFICE O/P EST MOD 30 MIN: CPT | Performed by: ANESTHESIOLOGY

## 2021-03-02 RX ORDER — OXYCODONE AND ACETAMINOPHEN 7.5; 325 MG/1; MG/1
1 TABLET ORAL EVERY 8 HOURS PRN
Qty: 60 TABLET | Refills: 0 | Status: SHIPPED | OUTPATIENT
Start: 2021-03-02 | End: 2021-04-29 | Stop reason: SDUPTHER

## 2021-03-04 RX ORDER — METHYLPHENIDATE HYDROCHLORIDE 10 MG/1
10 TABLET ORAL DAILY
Qty: 30 TABLET | Refills: 0 | Status: SHIPPED | OUTPATIENT
Start: 2021-03-04 | End: 2021-04-05 | Stop reason: SDUPTHER

## 2021-03-04 NOTE — TELEPHONE ENCOUNTER
Caller: Horacio Tony L     Relationship: Self    Best call back number: [phone]     Medication needed:   Requested Prescriptions     Pending Prescriptions Disp Refills   • methylphenidate (Ritalin) 10 MG tablet 30 tablet 0     Sig: Take 1 tablet by mouth Daily. For ADD 3 months       When do you need the refill by: 03/04/2021    What details did the patient provide when requesting the medication: NEEDING A NEW PRESCRIPTION SENT TO THE PHARMACY     Does the patient have less than a 3 day supply:  [x] Yes  [] No    What is the patient's preferred pharmacy: [facility] [address] AT [facility] & ([facility] [phone] [facility] [phone] FX

## 2021-03-31 ENCOUNTER — TELEPHONE (OUTPATIENT)
Dept: GASTROENTEROLOGY | Facility: CLINIC | Age: 81
End: 2021-03-31

## 2021-04-05 RX ORDER — METHYLPHENIDATE HYDROCHLORIDE 10 MG/1
10 TABLET ORAL DAILY
Qty: 30 TABLET | Refills: 0 | Status: SHIPPED | OUTPATIENT
Start: 2021-04-05 | End: 2021-05-05 | Stop reason: SDUPTHER

## 2021-04-05 NOTE — TELEPHONE ENCOUNTER
Caller: Tony Leonard    Relationship: Self    Best call back number: [phone]  Medication needed:   Requested Prescriptions     Pending Prescriptions Disp Refills   • methylphenidate (Ritalin) 10 MG tablet 30 tablet 0     Sig: Take 1 tablet by mouth Daily. For ADD 3 months     What is the patient's preferred pharmacy: [facility] AT [facility] [address] & ([facility] [phone] [facility] [phone] FX

## 2021-04-15 ENCOUNTER — OFFICE VISIT (OUTPATIENT)
Dept: GASTROENTEROLOGY | Facility: CLINIC | Age: 81
End: 2021-04-15

## 2021-04-15 VITALS — TEMPERATURE: 97.4 F | HEIGHT: 71 IN | BODY MASS INDEX: 24.5 KG/M2 | WEIGHT: 175 LBS

## 2021-04-15 DIAGNOSIS — K59.00 CONSTIPATION, UNSPECIFIED CONSTIPATION TYPE: ICD-10-CM

## 2021-04-15 DIAGNOSIS — R10.33 PERIUMBILICAL ABDOMINAL PAIN: ICD-10-CM

## 2021-04-15 DIAGNOSIS — K21.9 GASTROESOPHAGEAL REFLUX DISEASE WITHOUT ESOPHAGITIS: Primary | ICD-10-CM

## 2021-04-15 PROCEDURE — 99214 OFFICE O/P EST MOD 30 MIN: CPT | Performed by: NURSE PRACTITIONER

## 2021-04-15 NOTE — PROGRESS NOTES
Chief Complaint   Patient presents with   • Heartburn   • Abdominal Pain       Tony Leonard is a  80 y.o. male here for a follow up visit for GERD.    HPI  80-year-old male presents today for follow-up visit for GERD and constipation.  He is a patient of Dr. Mattson.  He was last seen in the office by me on 9/29/2020.  He has a history of GERD and admits as long as he takes Protonix 40 mg daily he does really well.  He denies any breakthrough reflux at this time.  He does have a history of chronic constipation and admits he is doing pretty well on daily MiraLAX and high-fiber diet.  He tells me sometimes the stools are hard to clean off but he is going regularly and he is not having any more accidents.  He denies any dysphagia, reflux, abdominal pain, nausea and vomiting, diarrhea, constipation rectal bleeding or melena.  He admits his appetite is good and his weight is stable.  He was having periumbilical abdominal pain when he got really constipated but he admits since his last visit he really has not had any of that pain.  He really feels like it is all because his bowels are moving so much better now.  His last EGD was in 2015.  His last colonoscopy was in 2019.  He does have a history of colon polyps.  Past Medical History:   Diagnosis Date   • ADHD (attention deficit hyperactivity disorder)    • Bronchiectasis (CMS/HCC)    • Colon polyp    • COPD (chronic obstructive pulmonary disease) (CMS/HCC)    • Diverticulosis    • Hard of hearing    • On home oxygen therapy     2 LITERS   • Pneumonia    • Prostate cancer (CMS/HCC) 4/22/2019       Past Surgical History:   Procedure Laterality Date   • BRONCHOSCOPY N/A 4/30/2016    Procedure: BRONCHOSCOPY with BAL of right lower lobe and left  lower lobe.  ;  Surgeon: Nando Diaz MD;  Location: [facility] ENDOSCOPY;  Service:    • BRONCHOSCOPY N/A 4/28/2017    Procedure: BRONCHOSCOPY;  Surgeon: Katharina Salter MD;  Location: [facility] ENDOSCOPY;  Service:    •  BRONCHOSCOPY Bilateral 6/29/2017    Procedure: BRONCHOSCOPY WITH WASHINGS;  Surgeon: Katharina Salter MD;  Location: [facility] ENDOSCOPY;  Service:    • BRONCHOSCOPY N/A 1/22/2018    Procedure: BRONCHOSCOPY AT BEDSIDE with BAL;  Surgeon: Katharina Salter MD;  Location: [facility] ENDOSCOPY;  Service:    • BRONCHOSCOPY N/A 1/30/2018    Procedure: BRONCHOSCOPY with BAL and washing;  Surgeon: Shreyas Wild MD;  Location: [facility] ENDOSCOPY;  Service:    • BRONCHOSCOPY Bilateral 4/24/2018    Procedure: BRONCHOSCOPY WITH WASHING;  Surgeon: Katharina Salter MD;  Location: [facility] ENDOSCOPY;  Service: Pulmonary   • BRONCHOSCOPY N/A 12/28/2019    Procedure: BRONCHOSCOPY with bilateral washing;  Surgeon: Katharina Salter MD;  Location: [facility] ENDOSCOPY;  Service: Pulmonary   • COLONOSCOPY  05/17/2013    eh, ih, tort, sig tics   • COLONOSCOPY N/A 5/10/2019    Non-thrombosed external hemorrhoids found on perianal exam, Diverticulosis, Tortuous colon, One 5 mm polyp in the mid ascending colon, IH. Path: Tubular adenoma.    • ENDOSCOPY  03/19/2015    z line irreg, hh   • HIP OPEN REDUCTION Right 1/17/2018    Procedure: HIP OPEN REDUCTION INTERNAL FIXATION WITH DYNAMIC HIP SCREW;  Surgeon: Les Black MD;  Location: [facility] OR;  Service:    • SPINE SURGERY     • TONSILLECTOMY     • TOTAL HIP ARTHROPLASTY REVISION Right 9/23/2019    Procedure: HIP  REVISION RIGHT;  Surgeon: Isauro Warner MD;  Location: [facility] OR;  Service: Orthopedics       Scheduled Meds:    Continuous Infusions:No current facility-administered medications for this visit.      PRN Meds:.    Allergies   Allergen Reactions   • Daliresp [Roflumilast] Anaphylaxis   • Latex Rash   • Sulfa Antibiotics Rash       Social History     Socioeconomic History   • Marital status: Single     Spouse name: Not on file   • Number of children: Not on file   • Years of education: Not on file   • Highest education level: Not on file   Tobacco Use   • Smoking status: Never Smoker   •  Smokeless tobacco: Never Used   Substance and Sexual Activity   • Alcohol use: No     Comment: red wine occassional   • Drug use: No   • Sexual activity: Defer       Family History   Problem Relation Age of Onset   • Thyroid disease Mother    • No Known Problems Father    • Malig Hyperthermia Neg Hx        Review of Systems   Constitutional: Negative for appetite change, chills, diaphoresis, fatigue, fever and unexpected weight change.   HENT: Negative for nosebleeds, postnasal drip, sore throat, trouble swallowing and voice change.    Respiratory: Negative for cough, choking, chest tightness, shortness of breath and wheezing.    Cardiovascular: Negative for chest pain, palpitations and leg swelling.   Gastrointestinal: Negative for abdominal distention, abdominal pain, anal bleeding, blood in stool, constipation, diarrhea, nausea, rectal pain and vomiting.   Endocrine: Negative for polydipsia, polyphagia and polyuria.   Musculoskeletal: Negative for gait problem.   Skin: Negative for rash and wound.   Allergic/Immunologic: Negative for food allergies.   Neurological: Negative for dizziness, speech difficulty and light-headedness.   Psychiatric/Behavioral: Negative for confusion, self-injury, sleep disturbance and suicidal ideas.       Vitals:    04/15/21 1402   Temp: 97.4 °F (36.3 °C)       Physical Exam  Constitutional:       General: He is not in acute distress.     Appearance: He is well-developed. He is not ill-appearing.   HENT:      Head: Normocephalic.   Eyes:      Pupils: Pupils are equal, round, and reactive to light.   Cardiovascular:      Rate and Rhythm: Normal rate and regular rhythm.      Heart sounds: Normal heart sounds.   Pulmonary:      Effort: Pulmonary effort is normal.      Breath sounds: Normal breath sounds.   Abdominal:      General: Bowel sounds are normal. There is no distension.      Palpations: Abdomen is soft. There is no mass.      Tenderness: There is no abdominal tenderness. There is  no guarding or rebound.      Hernia: No hernia is present.   Musculoskeletal:         General: Normal range of motion.   Skin:     General: Skin is warm and dry.   Neurological:      Mental Status: He is alert and oriented to person, place, and time.   Psychiatric:         Speech: Speech normal.         Behavior: Behavior normal.         Judgment: Judgment normal.         No radiology results for the last 7 days     Assessment and plan    1. Gastroesophageal reflux disease without esophagitis    2. Constipation, unspecified constipation type    3. Periumbilical abdominal pain      GERD seems well controlled on Protonix 40 mg daily.  Continue GERD precautions.  Constipation seems well controlled on daily MiraLAX.  He needs to continue a high-fiber diet.  He also needs to make sure he is continuing to drink enough water daily.  Overall he seems to be doing much better.  He is no longer having the periumbilical abdominal pain.  Bowels are moving well.  Patient to call the office with any issues.  Patient to follow-up with me in 6 months.  Patient is agreeable to the plan.

## 2021-04-27 NOTE — PROGRESS NOTES
The patient has a pain history of the following:  Chronic low back pain  Lumbar spondylosis   Lumbar disc disease   Scoliosis   Lumbar stenosis   Lumbar radiculopathy     Previous interventions that the patient has received include:   Epidural steroid injections  Possible other interventions      Pain medications include:  Gabapentin  Percocet 7.5-325mg BID  Requip  Tizanidine     Other conservative modalities which the patient reports using include:  Physical Therapy: yes  Neck or back surgery: yes  Past pain management: yes     Past Significant Surgical History:  L2-4 microlumbar laminectomy 9/30/2020  Lumbar surgery 1985    HPI:     CHIEF COMPLAINT Back Pain  F/U back pain- patient states that his pain has remained the same since his last visit.     Subjective   Tony Leonard is a 80 y.o. male  who presents for follow-up.  He has a history of back surgery with Dr. Chan on 9/30/2020.  Since his surgery he's been taking Percocet and Tizanidine for pain.  He takes 2 pills per day without side effects.  At his previous visit we decided to hold on interventions since his pain was improving.  He was going to start his own home exercises in place of physical therapy, and I referred him to an orthotist for shoe inserts/heel lift.    Mr. Leonard is here today stating his pain is the same as it has been.  He states that he was unable to see the orthotist because he needed an order from his orthopedic physician.  He continues to take percocet for his pain, which he says he takes 2 per day (although his last prescription has lasted for almost 2 months).      He has questions about the Radiofrequency ablation and spinal cord stimulator today.        PEG Assessment   What number best describes your pain on average in the past week?5  What number best describes how, during the past week, pain has interfered with your enjoyment of life?5  What number best describes how, during the past week, pain has interfered with your  "general activity?  5    REVIEW OF PERTINENT MEDICAL DATA  No new    The following portions of the patient's history were reviewed and updated as appropriate: allergies, current medications, past family history, past medical history, past social history, past surgical history and problem list.    Review of Systems   Constitutional: Positive for fatigue. Negative for activity change, chills and fever.   HENT: Positive for congestion.    Respiratory: Positive for cough and shortness of breath. Negative for chest tightness.    Cardiovascular: Negative for chest pain.   Gastrointestinal: Positive for abdominal pain and constipation. Negative for diarrhea.   Genitourinary: Positive for difficulty urinating (sometimes). Negative for dysuria.   Musculoskeletal: Positive for back pain.   Neurological: Positive for dizziness (sometimes), weakness (back ), light-headedness (sometimes) and numbness (bilateral legs). Negative for headaches.   Psychiatric/Behavioral: Positive for agitation (sometimes). Negative for sleep disturbance. The patient is nervous/anxious (sometimes).      I have reviewed and confirmed the accuracy of the ROS as documented by the MA/LPN/RN Anum Bhatt MD    Vitals:    04/29/21 1002   BP: 133/64   Pulse: 75   Resp: 16   Temp: 96.8 °F (36 °C)   SpO2: 95%   Weight: 77.3 kg (170 lb 6.4 oz)   Height: 180.3 cm (71\")   PainSc:   5   PainLoc: Back         Objective   Physical Exam  Vitals reviewed.   Constitutional:       General: He is not in acute distress.  Pulmonary:      Effort: Pulmonary effort is normal. No respiratory distress.   Musculoskeletal:      Comments: Using a rolling walker.  Moving quite well, sometimes drags the right foot.     Neurological:      General: No focal deficit present.      Mental Status: He is alert.   Psychiatric:         Mood and Affect: Mood normal.         Thought Content: Thought content normal.         Controlled Substance Monitoring:     Controlled Substance Agreement " Signed: Yes  Date Signed: 11/18/2020  Provider: Anum Bhatt MD  Last NEY Report Reviewed: 4/29/21  Appropriate for ongoing care?: Yes  Opioid Risk Category: Low    Last drug screen performed on:  11/5/2020 and was appropriate    Patient denies a history of substance/chemical abuse.      Assessment/Plan   Diagnoses and all orders for this visit:    1. History of back surgery  -     oxyCODONE-acetaminophen (Percocet) 7.5-325 MG per tablet; Take 1 tablet by mouth Every 8 (Eight) Hours As Needed for Severe Pain . 1 month prescription  Dispense: 60 tablet; Refill: 0    2. Chronic right-sided low back pain with left-sided sciatica  -     oxyCODONE-acetaminophen (Percocet) 7.5-325 MG per tablet; Take 1 tablet by mouth Every 8 (Eight) Hours As Needed for Severe Pain . 1 month prescription  Dispense: 60 tablet; Refill: 0    3. Scoliosis of lumbar spine, unspecified scoliosis type  -     oxyCODONE-acetaminophen (Percocet) 7.5-325 MG per tablet; Take 1 tablet by mouth Every 8 (Eight) Hours As Needed for Severe Pain . 1 month prescription  Dispense: 60 tablet; Refill: 0    4. Osteoarthritis of lumbar spine, unspecified spinal osteoarthritis complication status  -     oxyCODONE-acetaminophen (Percocet) 7.5-325 MG per tablet; Take 1 tablet by mouth Every 8 (Eight) Hours As Needed for Severe Pain . 1 month prescription  Dispense: 60 tablet; Refill: 0    5. Lumbar radiculopathy  -     oxyCODONE-acetaminophen (Percocet) 7.5-325 MG per tablet; Take 1 tablet by mouth Every 8 (Eight) Hours As Needed for Severe Pain . 1 month prescription  Dispense: 60 tablet; Refill: 0          - Provided information for Medial branch blocks and Radiofrequency ablation as well as brief explanation for spinal cord stimulator.   - Will refill Percocet without changes.  Patient appears stable with current regimen. No adverse effects. Regarding continuation of opioids, there is no evidence of aberrant behavior or any red flags.  The patient  continues with appropriate response to opioid therapy. ADL's remain intact by self.        --- Follow-up 1 month office visit            NEY REPORT    As part of the patient's treatment plan, I am prescribing controlled substances. The patient has been made aware of appropriate use of such medications, including potential risk of somnolence, limited ability to drive and/or work safely, and the potential for dependence or overdose. It has also bee made clear that these medications are for use by this patient only, without concomitant use of alcohol or other substances unless prescribed.     Patient has completed prescribing agreement detailing terms of continued prescribing of controlled substances, including monitoring NEY reports, urine drug screening, and pill counts if necessary. The patient is aware that inappropriate use will results in cessation of prescribing such medications.    As the clinician, I personally reviewed the NEY from 4/29/21 .    History and physical exam exhibit continued safe and appropriate use of controlled substances.    While examining this patient, I wore protective equipment including a mask, eye shield and gloves.  I washed my hands before and after this patient encounter.  The patient wore a mask throughout the visit as well.     Anum Bhatt MD  Pain Management

## 2021-04-29 ENCOUNTER — OFFICE VISIT (OUTPATIENT)
Dept: PAIN MEDICINE | Facility: CLINIC | Age: 81
End: 2021-04-29

## 2021-04-29 VITALS
OXYGEN SATURATION: 95 % | WEIGHT: 170.4 LBS | HEART RATE: 75 BPM | TEMPERATURE: 96.8 F | SYSTOLIC BLOOD PRESSURE: 133 MMHG | BODY MASS INDEX: 23.85 KG/M2 | DIASTOLIC BLOOD PRESSURE: 64 MMHG | RESPIRATION RATE: 16 BRPM | HEIGHT: 71 IN

## 2021-04-29 DIAGNOSIS — G89.29 CHRONIC RIGHT-SIDED LOW BACK PAIN WITH LEFT-SIDED SCIATICA: ICD-10-CM

## 2021-04-29 DIAGNOSIS — M47.816 OSTEOARTHRITIS OF LUMBAR SPINE, UNSPECIFIED SPINAL OSTEOARTHRITIS COMPLICATION STATUS: ICD-10-CM

## 2021-04-29 DIAGNOSIS — M54.42 CHRONIC RIGHT-SIDED LOW BACK PAIN WITH LEFT-SIDED SCIATICA: ICD-10-CM

## 2021-04-29 DIAGNOSIS — M54.16 LUMBAR RADICULOPATHY: ICD-10-CM

## 2021-04-29 DIAGNOSIS — Z98.890 HISTORY OF BACK SURGERY: ICD-10-CM

## 2021-04-29 DIAGNOSIS — M41.9 SCOLIOSIS OF LUMBAR SPINE, UNSPECIFIED SCOLIOSIS TYPE: ICD-10-CM

## 2021-04-29 PROCEDURE — 99214 OFFICE O/P EST MOD 30 MIN: CPT | Performed by: ANESTHESIOLOGY

## 2021-04-29 RX ORDER — OXYCODONE AND ACETAMINOPHEN 7.5; 325 MG/1; MG/1
1 TABLET ORAL EVERY 8 HOURS PRN
Qty: 60 TABLET | Refills: 0 | Status: SHIPPED | OUTPATIENT
Start: 2021-04-29 | End: 2021-06-17 | Stop reason: SDUPTHER

## 2021-04-29 NOTE — PATIENT INSTRUCTIONS
"Radiofrequency ablation (RFA):     - Radiofrequency ablation is a term used to describe cauterization or \"burning.\"   - In pain management, we can use this technique with a special needle to target and destroy areas that are causing your pain.   - In most cases, you must have TWO successful \"test injections\" before you are a candidate for RFA.    After your RFA:   - Because heat is used in this technique, it is common to have soreness after the procedure.  Sometimes \"neuritis\" occurs, which feels like tingling, prickly, or sunburn under the skin sensations.   - Ice packs are helpful in decreasing this soreness and preventing post-procedure \"neuritis\" pain.  Use an ice pack for 20 minutes at a time at least 3 times the day of and the day after your procedure.   - It is common to have leg numbness or weakness the day of your procedure, but this should wear off by the following day.   - It may take up to 6 weeks to gain full benefit from this procedure.      Medial Branch Nerve Block    Medial branch nerve block is a procedure to numb the nerves that supply the joints between your spinal bones (facet joints). The facet joints are located on the back of your spine. You may have the procedure on your neck or your upper, middle, or lower spine.  During this procedure, your health care provider will inject a numbing medicine (local anesthetic) around the medial nerves near the facet joint being treated. If more than one facet joint is causing pain, you may have more than one injection. In most cases, an anti-inflammatory medicine (steroid) will also be injected. You may need this procedure if:  · You have back pain from wear and tear (osteoarthritis) of your facet joint.  · You have an injury to a facet joint.  · Your health care provider wants to diagnose a facet joint as the cause of your pain. If the procedure relieves the pain, this indicates that the facet joint was the cause.  The local anesthetic will relieve pain " for several days. The steroid may continue to relieve pain for several weeks. If your pain returns when the medicines wear off, this procedure may be repeated.  Tell a health care provider about:  · Any allergies you have.  · All medicines you are taking, including vitamins, herbs, eye drops, creams, and over-the-counter medicines.  · Any problems you or family members have had with anesthetic medicines.  · Any blood disorders you have.  · Any surgeries you have had.  · Any medical conditions you have.  · Whether you are pregnant or may be pregnant.  What are the risks?  Generally, this is a safe procedure. However, problems may occur, including:  · Infection.  · Bleeding.  · Allergic reactions to medicines or dyes.  · Damage to other structures or organs.  · Injection of the anesthetic into a blood vessel, which may decrease blood supply to your spinal cord and cause damage.  · Spread of the anesthetic to nearby nerves, which may cause temporary weakness or numbness.  What happens before the procedure?  · Ask your health care provider about:  ? Changing or stopping your regular medicines. This is especially important if you are taking diabetes medicines or blood thinners.  ? Taking medicines such as aspirin and ibuprofen. These medicines can thin your blood. Do not take these medicines unless your health care provider tells you to take them.  ? Taking over-the-counter medicines, vitamins, herbs, and supplements.  · Plan to have someone take you home from the hospital or clinic.  · Follow instructions from your health care provider about any eating or drinking restrictions before the procedure.  · Ask your health care provider what steps will be taken to help prevent infection. These may include:  ? Removing hair at the injection site.  ? Washing skin with a germ-killing soap.  What happens during the procedure?  · An IV may be inserted into one of your veins.  · You will be given one or more of the following:  ? A  medicine to help you relax (sedative).  ? A medicine to numb the area (local anesthetic). Your health care provider will feel for the facet joint or joints that are causing pain and inject a short-acting local anesthetic into the skin over the joint or joints.  · Your health care provider will then pass a needle into the area around the facet joint.  · Your health care provider may use a type of X-ray (fluoroscopy) to look at images of your spinal cord. If so, the health care provider will inject a small amount of dye into the facet joint area. The dye will show up on fluoroscopy and help locate the exact area to inject the long-acting anesthetic.  · The medicine will then be injected. Along with the long-acting anesthetic, a steroid medicine may also be injected.  · The needle will be removed, and a bandage will be placed over the injection site.  The procedure may vary among health care providers and hospitals.  What can I expect after the procedure?  · Your blood pressure, heart rate, breathing rate, and blood oxygen level will be monitored until you leave the hospital or clinic.  · You should feel less pain in your back.  · You may have some soreness around the injection site.  Follow these instructions at home:  Injection site care  · Leave your bandage on for 24 hours.  · Do not take baths, swim, or use a hot tub until your health care provider approves.  · Check your injection site every day for signs of infection. Check for:  ? Redness, swelling, or pain.  ? Fluid or blood.  ? Warmth.  ? Pus or a bad smell.  · If directed, put ice on the injection area:  ? Put ice in a plastic bag.  ? Place a towel between your skin and the bag.  ? Leave the ice on for 20 minutes, 2-3 times a day.  General instructions  · Take over-the-counter and prescription medicines only as told by your health care provider.  · Do not drive for 24 hours if you were given a sedative during the procedure.  · Return to your normal  activities as told by your health care provider. Ask your health care provider what activities are safe for you.  · Keep a log of your pain after the procedure. Keep track of how much pain you have and when you have it. This will help your health care provider plan your future treatment.  ? You should have relief of pain from the anesthetic for up to 3 days.  ? After that you may notice some pain again until the steroid starts to help. Pain relief from the steroid may last for a few weeks.  · Keep all follow-up visits as told by your health care provider. This is important.  Contact your health care provider if:  · Your pain is not relieved or gets worse at home.  · You have a fever or chills.  · You have any signs of infection.  · You develop any numbness or weakness.  Summary  · Medial branch nerve block is a procedure to numb the nerves that supply the joints between your spinal bones (facet joint).  · You may have the procedure on your neck or your upper, middle, or lower spine.  · This procedure may be done both to diagnose and relieve facet joint pain.  · A long-acting local anesthetic is injected close to the nerve that supplies the facet joint. An anti-inflammatory medicine (steroid) will also be injected.  This information is not intended to replace advice given to you by your health care provider. Make sure you discuss any questions you have with your health care provider.  Document Revised: 04/10/2020 Document Reviewed: 08/22/2019  [employer] Patient Education © 2021 [employer]

## 2021-05-04 DIAGNOSIS — M62.838 MUSCLE SPASM: ICD-10-CM

## 2021-05-04 DIAGNOSIS — Z98.890 HISTORY OF BACK SURGERY: ICD-10-CM

## 2021-05-04 RX ORDER — TIZANIDINE HYDROCHLORIDE 6 MG/1
CAPSULE, GELATIN COATED ORAL
Qty: 90 CAPSULE | Refills: 3 | Status: SHIPPED | OUTPATIENT
Start: 2021-05-04 | End: 2022-01-27 | Stop reason: SDUPTHER

## 2021-05-04 RX ORDER — PANTOPRAZOLE SODIUM 40 MG/1
TABLET, DELAYED RELEASE ORAL
Qty: 90 TABLET | Refills: 3 | Status: SHIPPED | OUTPATIENT
Start: 2021-05-04 | End: 2022-07-27 | Stop reason: SDUPTHER

## 2021-05-05 ENCOUNTER — TELEPHONE (OUTPATIENT)
Dept: FAMILY MEDICINE CLINIC | Facility: CLINIC | Age: 81
End: 2021-05-05

## 2021-05-05 RX ORDER — METHYLPHENIDATE HYDROCHLORIDE 10 MG/1
10 TABLET ORAL DAILY
Qty: 30 TABLET | Refills: 0 | Status: SHIPPED | OUTPATIENT
Start: 2021-05-05 | End: 2021-06-08 | Stop reason: SDUPTHER

## 2021-05-05 RX ORDER — METHYLPHENIDATE HYDROCHLORIDE 10 MG/1
10 TABLET ORAL DAILY
Qty: 30 TABLET | Refills: 0 | Status: CANCELLED | OUTPATIENT
Start: 2021-05-05

## 2021-05-05 NOTE — TELEPHONE ENCOUNTER
Caller: Tony Leonard    Relationship: Self    Best call back number: [phone]     Medication needed:   Requested Prescriptions     Pending Prescriptions Disp Refills   • methylphenidate (Ritalin) 10 MG tablet 30 tablet 0     Sig: Take 1 tablet by mouth Daily. For ADD 3 months       When do you need the refill by: ASAP      Does the patient have less than a 3 day supply:  [x] Yes  [] No    What is the patient's preferred pharmacy: [facility] [address] & ([facility] [phone] [facility] [phone] [facility]

## 2021-05-11 ENCOUNTER — OFFICE VISIT (OUTPATIENT)
Dept: FAMILY MEDICINE CLINIC | Facility: CLINIC | Age: 81
End: 2021-05-11

## 2021-05-11 VITALS
OXYGEN SATURATION: 97 % | HEIGHT: 71 IN | DIASTOLIC BLOOD PRESSURE: 60 MMHG | BODY MASS INDEX: 24.78 KG/M2 | SYSTOLIC BLOOD PRESSURE: 110 MMHG | TEMPERATURE: 99.3 F | WEIGHT: 177 LBS | HEART RATE: 84 BPM

## 2021-05-11 DIAGNOSIS — R53.81 CHRONIC FATIGUE AND MALAISE: ICD-10-CM

## 2021-05-11 DIAGNOSIS — R53.82 CHRONIC FATIGUE AND MALAISE: ICD-10-CM

## 2021-05-11 DIAGNOSIS — R20.0 NUMBNESS AND TINGLING OF BOTH FEET: ICD-10-CM

## 2021-05-11 DIAGNOSIS — J44.9 CHRONIC OBSTRUCTIVE PULMONARY DISEASE, UNSPECIFIED COPD TYPE (HCC): ICD-10-CM

## 2021-05-11 DIAGNOSIS — Z00.00 MEDICARE ANNUAL WELLNESS VISIT, SUBSEQUENT: Primary | ICD-10-CM

## 2021-05-11 DIAGNOSIS — E55.9 VITAMIN D DEFICIENCY, UNSPECIFIED: ICD-10-CM

## 2021-05-11 DIAGNOSIS — R53.83 MALAISE AND FATIGUE: ICD-10-CM

## 2021-05-11 DIAGNOSIS — R53.81 MALAISE AND FATIGUE: ICD-10-CM

## 2021-05-11 DIAGNOSIS — M47.816 OSTEOARTHRITIS OF LUMBAR SPINE, UNSPECIFIED SPINAL OSTEOARTHRITIS COMPLICATION STATUS: ICD-10-CM

## 2021-05-11 DIAGNOSIS — R20.2 NUMBNESS AND TINGLING OF BOTH FEET: ICD-10-CM

## 2021-05-11 LAB
25(OH)D3 SERPL-MCNC: 47.7 NG/ML (ref 30–100)
ALBUMIN SERPL-MCNC: 4.1 G/DL (ref 3.5–5.2)
ALBUMIN/GLOB SERPL: 1.6 G/DL
ALP SERPL-CCNC: 71 U/L (ref 39–117)
ALT SERPL W P-5'-P-CCNC: 16 U/L (ref 1–41)
ANION GAP SERPL CALCULATED.3IONS-SCNC: 9.4 MMOL/L (ref 5–15)
AST SERPL-CCNC: 20 U/L (ref 1–40)
BILIRUB SERPL-MCNC: 0.6 MG/DL (ref 0–1.2)
BUN SERPL-MCNC: 22 MG/DL (ref 8–23)
BUN/CREAT SERPL: 21.4 (ref 7–25)
CALCIUM SPEC-SCNC: 9.4 MG/DL (ref 8.6–10.5)
CHLORIDE SERPL-SCNC: 105 MMOL/L (ref 98–107)
CHOLEST SERPL-MCNC: 216 MG/DL (ref 0–200)
CO2 SERPL-SCNC: 27.6 MMOL/L (ref 22–29)
CREAT SERPL-MCNC: 1.03 MG/DL (ref 0.76–1.27)
DEPRECATED RDW RBC AUTO: 44.4 FL (ref 37–54)
ERYTHROCYTE [DISTWIDTH] IN BLOOD BY AUTOMATED COUNT: 12.6 % (ref 12.3–15.4)
GFR SERPL CREATININE-BSD FRML MDRD: 69 ML/MIN/1.73
GLOBULIN UR ELPH-MCNC: 2.5 GM/DL
GLUCOSE SERPL-MCNC: 111 MG/DL (ref 65–99)
HCT VFR BLD AUTO: 38 % (ref 37.5–51)
HDLC SERPL-MCNC: 74 MG/DL (ref 40–60)
HGB BLD-MCNC: 12.6 G/DL (ref 13–17.7)
LDLC SERPL CALC-MCNC: 129 MG/DL (ref 0–100)
LDLC/HDLC SERPL: 1.72 {RATIO}
MCH RBC QN AUTO: 32.4 PG (ref 26.6–33)
MCHC RBC AUTO-ENTMCNC: 33.2 G/DL (ref 31.5–35.7)
MCV RBC AUTO: 97.7 FL (ref 79–97)
PLATELET # BLD AUTO: 221 10*3/MM3 (ref 140–450)
PMV BLD AUTO: 9.3 FL (ref 6–12)
POTASSIUM SERPL-SCNC: 4.8 MMOL/L (ref 3.5–5.2)
PROT SERPL-MCNC: 6.6 G/DL (ref 6–8.5)
RBC # BLD AUTO: 3.89 10*6/MM3 (ref 4.14–5.8)
SODIUM SERPL-SCNC: 142 MMOL/L (ref 136–145)
T-UPTAKE NFR SERPL: 1.1 TBI (ref 0.8–1.3)
T4 SERPL-MCNC: 4.99 MCG/DL (ref 4.5–11.7)
TRIGL SERPL-MCNC: 73 MG/DL (ref 0–150)
TSH SERPL DL<=0.05 MIU/L-ACNC: 1.42 UIU/ML (ref 0.27–4.2)
VLDLC SERPL-MCNC: 13 MG/DL (ref 5–40)
WBC # BLD AUTO: 6.52 10*3/MM3 (ref 3.4–10.8)

## 2021-05-11 PROCEDURE — 36415 COLL VENOUS BLD VENIPUNCTURE: CPT | Performed by: INTERNAL MEDICINE

## 2021-05-11 PROCEDURE — 82306 VITAMIN D 25 HYDROXY: CPT | Performed by: INTERNAL MEDICINE

## 2021-05-11 PROCEDURE — G0439 PPPS, SUBSEQ VISIT: HCPCS | Performed by: INTERNAL MEDICINE

## 2021-05-11 PROCEDURE — 80053 COMPREHEN METABOLIC PANEL: CPT | Performed by: INTERNAL MEDICINE

## 2021-05-11 PROCEDURE — 85027 COMPLETE CBC AUTOMATED: CPT | Performed by: INTERNAL MEDICINE

## 2021-05-11 PROCEDURE — 99214 OFFICE O/P EST MOD 30 MIN: CPT | Performed by: INTERNAL MEDICINE

## 2021-05-11 PROCEDURE — 80061 LIPID PANEL: CPT | Performed by: INTERNAL MEDICINE

## 2021-05-11 PROCEDURE — 84436 ASSAY OF TOTAL THYROXINE: CPT | Performed by: INTERNAL MEDICINE

## 2021-05-11 PROCEDURE — 84443 ASSAY THYROID STIM HORMONE: CPT | Performed by: INTERNAL MEDICINE

## 2021-05-11 PROCEDURE — 93000 ELECTROCARDIOGRAM COMPLETE: CPT | Performed by: INTERNAL MEDICINE

## 2021-05-11 PROCEDURE — 84479 ASSAY OF THYROID (T3 OR T4): CPT | Performed by: INTERNAL MEDICINE

## 2021-05-11 RX ORDER — METHYLPHENIDATE HYDROCHLORIDE 10 MG/1
10 TABLET ORAL DAILY
Qty: 30 TABLET | Refills: 0 | OUTPATIENT
Start: 2021-05-11

## 2021-05-11 NOTE — PROGRESS NOTES
QUICK REFERENCE INFORMATION:  The ABCs of the Annual Wellness Visit    Subsequent Medicare Wellness Visit patient was seen for Medicare wellness exam.  Patient complains of chronic malaise and fatigue.  Patient states he sleeps very often during the day and also sleep at night.  Patient states he has been crying over the past 2 weeks.  Patient's EKG was essentially normal.  Patient did have labs drawn to check for thyroid.  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  Patient is using pain medication to control his symptoms.  Patient does have vitamin D deficiency supplemented with over-the-counter D3.  Patient COPD is controlled with his bronchodilators.  Patient is using Proventil with budesonide mini neb solutions.  Patient is also using Anoro 1 puff daily.  Symptoms very controlled with this treatment.  Patient did complain of tingling and numbness in both feet.  Patient states has been going on also for 2 weeks.  Patient did have nerve conduction with EMG ordered.    Dictated utilizing Dragon dictation. If there are questions or for further clarification, please contact me.    HEALTH RISK ASSESSMENT    1940    Recent Hospitalizations:  Recently treated at the following:  [facility].        Current Medical Providers:  Patient Care Team:  Erich Aviles MD as PCP - General (Internal Medicine)  Katharina Salter MD as Consulting Physician (Pulmonary Disease)        Smoking Status:  Social History     Tobacco Use   Smoking Status Never Smoker   Smokeless Tobacco Never Used       Alcohol Consumption:  Social History     Substance and Sexual Activity   Alcohol Use No    Comment: red wine occassional       Depression Screen:   PHQ-2/PHQ-9 Depression Screening 5/11/2021   Little interest or pleasure in doing things 0   Feeling down, depressed, or hopeless 0   Trouble falling or staying asleep, or sleeping too much 0   Feeling tired or having little energy 0   Poor appetite or  overeating 0   Feeling bad about yourself - or that you are a failure or have let yourself or your family down 0   Trouble concentrating on things, such as reading the newspaper or watching television 0   Moving or speaking so slowly that other people could have noticed. Or the opposite - being so fidgety or restless that you have been moving around a lot more than usual 0   Thoughts that you would be better off dead, or of hurting yourself in some way 0   Total Score 0   If you checked off any problems, how difficult have these problems made it for you to do your work, take care of things at home, or get along with other people? Not difficult at all       Health Habits and Functional and Cognitive Screening:  Functional & Cognitive Status 5/11/2021   Do you have difficulty preparing food and eating? No   Do you have difficulty bathing yourself, getting dressed or grooming yourself? No   Do you have difficulty using the toilet? No   Do you have difficulty moving around from place to place? No   Do you have trouble with steps or getting out of a bed or a chair? No   Current Diet Well Balanced Diet   Dental Exam Up to date   Eye Exam Up to date   Exercise (times per week) 7 times per week   Current Exercises Include Aerobics   Current Exercise Activities Include -   Do you need help using the phone?  No   Are you deaf or do you have serious difficulty hearing?  No   Do you need help with transportation? No   Do you need help shopping? No   Do you need help preparing meals?  No   Do you need help with housework?  No   Do you need help with laundry? No   Do you need help taking your medications? No   Do you need help managing money? No   Do you ever drive or ride in a car without wearing a seat belt? No   Have you felt unusual stress, anger or loneliness in the last month? No   Who do you live with? Alone   If you need help, do you have trouble finding someone available to you? No   Have you been bothered in the last  four weeks by sexual problems? No   Do you have difficulty concentrating, remembering or making decisions? No           Does the patient have evidence of cognitive impairment? No    Aspirin use counseling: Does not need ASA (and currently is not on it)      Recent Lab Results:  CMP:  Lab Results   Component Value Date    GLU 92 10/02/2020    BUN 25 (H) 10/14/2020    CREATININE 1.06 10/14/2020    EGFRIFNONA 67 10/14/2020    BCR 23.6 10/14/2020     10/14/2020    K 4.2 10/14/2020    CO2 26.8 10/14/2020    CALCIUM 9.5 10/14/2020    PROTENTOTREF 5.8 (L) 04/19/2017    ALBUMIN 4.20 10/14/2020    LABGLOBREF 2.5 04/19/2017    LABIL2 1.4 10/02/2020    BILITOT 0.4 10/14/2020    ALKPHOS 76 10/14/2020    AST 19 10/14/2020    ALT 21 10/14/2020     Lipid Panel:  Lab Results   Component Value Date    CHOL 197 07/19/2018    TRIG 71 07/19/2018    HDL 52 07/19/2018    VLDL 14.2 07/19/2018    LDLHDL 2.52 07/19/2018     HbA1c:  Lab Results   Component Value Date    HGBA1C 5.39 04/04/2017       Visual Acuity:  No exam data present    Age-appropriate Screening Schedule:  Refer to the list below for future screening recommendations based on patient's age, sex and/or medical conditions. Orders for these recommended tests are listed in the plan section. The patient has been provided with a written plan.    Health Maintenance   Topic Date Due   • ZOSTER VACCINE (2 of 2) 09/26/2016   • PROSTATE CANCER SCREENING  08/01/2020   • INFLUENZA VACCINE  08/01/2021   • TDAP/TD VACCINES (2 - Td) 01/01/2023        Subjective   History of Present Illness    Tony Leonard is a 80 y.o. male who presents for an Subsequent Wellness Visit.    The following portions of the patient's history were reviewed and updated as appropriate: allergies, current medications, past family history, past medical history, past social history, past surgical history and problem list.    Outpatient Medications Prior to Visit   Medication Sig Dispense Refill   • albuterol  (ACCUNEB) 1.25 MG/3ML nebulizer solution Inhale 1 ampule.     • albuterol (PROVENTIL) (2.5 MG/3ML) 0.083% nebulizer solution Take 2.5 mg by nebulization 4 (Four) Times a Day. (Patient taking differently: Take 2.5 mg by nebulization 2 (Two) Times a Day.) 360 mL 3   • budesonide (PULMICORT) 0.5 MG/2ML nebulizer solution Take 2 mL by nebulization 2 (Two) Times a Day. 1 vial only twice daily 30 each 3   • calcium carbonate (TUMS) 500 MG chewable tablet Chew 1 tablet As Needed for Indigestion or Heartburn.     • colestipol (COLESTID) 5 g granules Take 5 g by mouth 2 (Two) Times a Day. Prn after diarrhea 500 g 3   • FLUoxetine (PROzac) 20 MG capsule TAKE 1 CAPSULE DAILY 90 capsule 3   • furosemide (LASIX) 40 MG tablet Take 40 mg by mouth.     • gabapentin (NEURONTIN) 100 MG capsule Take 1 capsule by mouth Every Night. 3 months 270 capsule 1   • ketoconazole (NIZORAL) 2 % shampoo Apply  topically to the appropriate area as directed 2 (Two) Times a Week. 120 mL 3   • methylphenidate (Ritalin) 10 MG tablet Take 1 tablet by mouth Daily. For ADD 3 months 30 tablet 0   • Multiple Vitamins-Minerals (MULTIVITAMIN ADULT PO) Take 1 tablet by mouth Daily.     • oxyCODONE-acetaminophen (Percocet) 7.5-325 MG per tablet Take 1 tablet by mouth Every 8 (Eight) Hours As Needed for Severe Pain . 1 month prescription 60 tablet 0   • OXYGEN-HELIUM IN Inhale 2.5 L Daily As Needed.     • pantoprazole (PROTONIX) 40 MG EC tablet TAKE 1 TABLET DAILY 90 tablet 3   • polyethylene glycol (MiraLax) 17 g packet Take 17 g by mouth Daily. 30 packet 11   • potassium chloride ER (K-TAB) 20 MEQ tablet controlled-release ER tablet Take 20 mEq by mouth Daily.     • rOPINIRole (REQUIP) 0.5 MG tablet Take 1 tablet by mouth 3 (Three) Times a Day. Take 1 hour before bedtime. 90 tablet 1   • saccharomyces boulardii (Florastor) 250 MG capsule Take 1 capsule by mouth 2 (Two) Times a Day. 60 capsule 4   • TiZANidine (ZANAFLEX) 6 MG capsule TAKE 1 CAPSULE EVERY NIGHT  90 capsule 3   • Turmeric 500 MG capsule Take  by mouth Daily.     • umeclidinium-vilanterol (Anoro Ellipta) 62.5-25 MCG/INH aerosol powder  inhaler Inhale 1 puff Daily. 3 each 3   • vitamin B-12 (CYANOCOBALAMIN) 2500 MCG sublingual tablet tablet Take 2,500 mcg by mouth Every Evening.     • Misc. Devices (Rollator Ultra-Light) misc 1 application Daily. Severe DJD 1 each 0   • sodium chloride 7 % nebulizer solution nebulizer solution        No facility-administered medications prior to visit.       Patient Active Problem List   Diagnosis   • Thrush, oral   • ADD (attention deficit disorder)   • Hemorrhoids   • Bronchiectasis with acute lower respiratory infection (CMS/HCC)   • Diverticul disease small and large intestine, no perforati or abscess   • Benign non-nodular prostatic hyperplasia without lower urinary tract symptoms   • Gastroesophageal reflux disease   • Malaise and fatigue   • Environmental allergies   • Hemoptysis   • Dyslipidemia   • Primary osteoarthritis involving multiple joints   • Pneumonia of right lower lobe due to infectious organism   • Abnormal EKG   • COPD (chronic obstructive pulmonary disease) (CMS/HCC)   • Mild malnutrition (CMS/HCC)   • Acute on chronic respiratory failure with hypoxia (CMS/HCC)   • Fracture, intertrochanteric, right femur, closed, initial encounter (CMS/HCC)   • Hematoma of frontal scalp   • Postoperative anemia due to acute blood loss   • Urinary retention   • Hemorrhagic disorder due to circulating anticoagulants (CMS/HCC)   • History of fracture of right hip   • Closed fracture of right hip with routine healing   • Chronic low back pain with sciatica   • Change in bowel function   • Rectal bleeding   • Prostate cancer (CMS/HCC)   • On home oxygen therapy   • Acute viral bronchitis   • Acute on chronic systolic (congestive) heart failure (CMS/HCC)       Advance Care Planning:  ACP discussion was held with the patient during this visit. Patient has an advance directive  in EMR which is still valid.     Identification of Risk Factors:  Risk factors include: NA.    Review of Systems   Musculoskeletal: Positive for gait problem and joint swelling.   Neurological: Positive for weakness and numbness.       Compared to one year ago, the patient feels his physical health is the same.  Compared to one year ago, the patient feels his mental health is the same.    Objective     Physical Exam  Vitals and nursing note reviewed.   Constitutional:       Appearance: Normal appearance. He is well-developed.   HENT:      Head: Normocephalic and atraumatic.      Nose: Nose normal.      Mouth/Throat:      Mouth: Mucous membranes are moist.      Pharynx: Oropharynx is clear.   Eyes:      Extraocular Movements: Extraocular movements intact.      Conjunctiva/sclera: Conjunctivae normal.      Pupils: Pupils are equal, round, and reactive to light.   Cardiovascular:      Rate and Rhythm: Normal rate and regular rhythm.      Heart sounds: Normal heart sounds. No murmur heard.   No friction rub. No gallop.    Pulmonary:      Effort: Pulmonary effort is normal. No respiratory distress.      Breath sounds: Normal breath sounds. No stridor. No wheezing, rhonchi or rales.   Chest:      Chest wall: No tenderness.   Abdominal:      General: Bowel sounds are normal.      Palpations: Abdomen is soft.   Musculoskeletal:         General: Normal range of motion.      Cervical back: Normal range of motion and neck supple.   Skin:     General: Skin is warm and dry.   Neurological:      General: No focal deficit present.      Mental Status: He is alert and oriented to person, place, and time. Mental status is at baseline.   Psychiatric:         Mood and Affect: Mood normal.         Behavior: Behavior normal.         Thought Content: Thought content normal.         Judgment: Judgment normal.         Vitals:    05/11/21 1448   BP: 110/60   BP Location: Left arm   Patient Position: Sitting   Cuff Size: Adult   Pulse: 84  "  Temp: 99.3 °F (37.4 °C)   TempSrc: Temporal   SpO2: 97%   Weight: 80.3 kg (177 lb)   Height: 180.3 cm (71\")   PainSc:   4   PainLoc: Back       Patient's Body mass index is 24.69 kg/m². indicating that he is within normal range (BMI 18.5-24.9). No BMI management plan needed..      Assessment/Plan #1 continue bronchodilator therapy number II nerve conduction with EMG #3 EKG  Patient Self-Management and Personalized Health Advice  The patient has been provided with information about: NA and preventive services including:   · NA.    Visit Diagnoses:    ICD-10-CM ICD-9-CM   1. Medicare annual wellness visit, subsequent  Z00.00 V70.0   2. Chronic fatigue and malaise  R53.82 780.71    R53.81    3. Osteoarthritis of lumbar spine, unspecified spinal osteoarthritis complication status  M47.816 721.3   4. Vitamin D deficiency, unspecified  E55.9 268.9   5. Malaise and fatigue  R53.81 780.79    R53.83    6. Chronic obstructive pulmonary disease, unspecified COPD type (CMS/[facility])  J44.9 496   7. Vitamin D deficiency, unspecified   E55.9 268.9   8. Numbness and tingling of both feet  R20.0 782.0    R20.2        Orders Placed This Encounter   Procedures   • CBC (No Diff)     Standing Status:   Future     Number of Occurrences:   1     Standing Expiration Date:   5/11/2022     Order Specific Question:   Release to patient     Answer:   Immediate   • Thyroid Panel With TSH     Order Specific Question:   Release to patient     Answer:   Immediate   • ECG 12 Lead     Order Specific Question:   Reason for Exam:     Answer:   malaise fatigue   • EMG & Nerve Conduction Test     Standing Status:   Future     Standing Expiration Date:   5/11/2022     Order Specific Question:   Extremity     Answer:   numbness both legs     Order Specific Question:   Please specify number of extremities:     Answer:   2 Extremities     Order Specific Question:   Reason for Exam:     Answer:   numbness       Outpatient Encounter Medications as of 5/11/2021 "   Medication Sig Dispense Refill   • albuterol (ACCUNEB) 1.25 MG/3ML nebulizer solution Inhale 1 ampule.     • albuterol (PROVENTIL) (2.5 MG/3ML) 0.083% nebulizer solution Take 2.5 mg by nebulization 4 (Four) Times a Day. (Patient taking differently: Take 2.5 mg by nebulization 2 (Two) Times a Day.) 360 mL 3   • budesonide (PULMICORT) 0.5 MG/2ML nebulizer solution Take 2 mL by nebulization 2 (Two) Times a Day. 1 vial only twice daily 30 each 3   • calcium carbonate (TUMS) 500 MG chewable tablet Chew 1 tablet As Needed for Indigestion or Heartburn.     • colestipol (COLESTID) 5 g granules Take 5 g by mouth 2 (Two) Times a Day. Prn after diarrhea 500 g 3   • FLUoxetine (PROzac) 20 MG capsule TAKE 1 CAPSULE DAILY 90 capsule 3   • furosemide (LASIX) 40 MG tablet Take 40 mg by mouth.     • gabapentin (NEURONTIN) 100 MG capsule Take 1 capsule by mouth Every Night. 3 months 270 capsule 1   • ketoconazole (NIZORAL) 2 % shampoo Apply  topically to the appropriate area as directed 2 (Two) Times a Week. 120 mL 3   • methylphenidate (Ritalin) 10 MG tablet Take 1 tablet by mouth Daily. For ADD 3 months 30 tablet 0   • Multiple Vitamins-Minerals (MULTIVITAMIN ADULT PO) Take 1 tablet by mouth Daily.     • oxyCODONE-acetaminophen (Percocet) 7.5-325 MG per tablet Take 1 tablet by mouth Every 8 (Eight) Hours As Needed for Severe Pain . 1 month prescription 60 tablet 0   • OXYGEN-HELIUM IN Inhale 2.5 L Daily As Needed.     • pantoprazole (PROTONIX) 40 MG EC tablet TAKE 1 TABLET DAILY 90 tablet 3   • polyethylene glycol (MiraLax) 17 g packet Take 17 g by mouth Daily. 30 packet 11   • potassium chloride ER (K-TAB) 20 MEQ tablet controlled-release ER tablet Take 20 mEq by mouth Daily.     • rOPINIRole (REQUIP) 0.5 MG tablet Take 1 tablet by mouth 3 (Three) Times a Day. Take 1 hour before bedtime. 90 tablet 1   • saccharomyces boulardii (Florastor) 250 MG capsule Take 1 capsule by mouth 2 (Two) Times a Day. 60 capsule 4   • TiZANidine  (ZANAFLEX) 6 MG capsule TAKE 1 CAPSULE EVERY NIGHT 90 capsule 3   • Turmeric 500 MG capsule Take  by mouth Daily.     • umeclidinium-vilanterol (Anoro Ellipta) 62.5-25 MCG/INH aerosol powder  inhaler Inhale 1 puff Daily. 3 each 3   • vitamin B-12 (CYANOCOBALAMIN) 2500 MCG sublingual tablet tablet Take 2,500 mcg by mouth Every Evening.     • Misc. Devices (Rollator Ultra-Light) misc 1 application Daily. Severe DJD 1 each 0   • sodium chloride 7 % nebulizer solution nebulizer solution        No facility-administered encounter medications on file as of 5/11/2021.       Reviewed use of high risk medication in the elderly: not applicable  Reviewed for potential of harmful drug interactions in the elderly: not applicable    Follow Up:  No follow-ups on file.     An After Visit Summary and PPPS with all of these plans were given to the patient.

## 2021-05-11 NOTE — PATIENT INSTRUCTIONS
Medicare Wellness  Personal Prevention Plan of Service     Date of Office Visit:  2021  Encounter Provider:  Erich Aviles MD  Place of Service:  [facility]  Patient Name: Tony Leonard  :  1940    As part of the Medicare Wellness portion of your visit today, we are providing you with this personalized preventive plan of services (PPPS). This plan is based upon recommendations of the [place] Preventive Services Task Force (USPSTF) and the Advisory Committee on Immunization Practices (ACIP).    This lists the preventive care services that should be considered, and provides dates of when you are due. Items listed as completed are up-to-date and do not require any further intervention.    Health Maintenance   Topic Date Due   • ZOSTER VACCINE (2 of 2) 2016   • PROSTATE CANCER SCREENING  2020   • INFLUENZA VACCINE  2021   • COLORECTAL CANCER SCREENING  05/10/2022   • ANNUAL WELLNESS VISIT  2022   • TDAP/TD VACCINES (2 - Td) 2023   • COVID-19 Vaccine  Completed   • Pneumococcal Vaccine 65+  Completed       Orders Placed This Encounter   Procedures   • CBC (No Diff)     Standing Status:   Future     Standing Expiration Date:   2022     Order Specific Question:   Release to patient     Answer:   Immediate   • Comprehensive Metabolic Panel     Order Specific Question:   Release to patient     Answer:   Immediate   • Vitamin D 25 Hydroxy     Order Specific Question:   Release to patient     Answer:   Immediate   • Thyroid Panel With TSH     Order Specific Question:   Release to patient     Answer:   Immediate   • ECG 12 Lead     Order Specific Question:   Reason for Exam:     Answer:   malaise fatigue       No follow-ups on file.

## 2021-05-11 NOTE — PROGRESS NOTES
ECG 12 Lead    Date/Time: 5/11/2021 4:05 PM  Performed by: Erich Aviles MD  Authorized by: Erich Aviles MD   Comparison: not compared with previous ECG   Rhythm: sinus tachycardia  Rate: normal  Conduction: conduction normal  ST Segments: ST segments normal  T Waves: T waves normal  QRS axis: normal  Other: no other findings    Clinical impression: normal ECG  Comments: EKG Interpretation Report    Heart rate:    67 beats/min, ND interval:  186 msec, QRS duration:  81 msec  QTu:391 msec, QTc:  406 msec

## 2021-05-17 ENCOUNTER — TELEPHONE (OUTPATIENT)
Dept: GASTROENTEROLOGY | Facility: CLINIC | Age: 81
End: 2021-05-17

## 2021-06-08 RX ORDER — METHYLPHENIDATE HYDROCHLORIDE 10 MG/1
10 TABLET ORAL DAILY
Qty: 30 TABLET | Refills: 0 | Status: SHIPPED | OUTPATIENT
Start: 2021-06-08 | End: 2021-07-12 | Stop reason: SDUPTHER

## 2021-06-08 NOTE — TELEPHONE ENCOUNTER
Caller: Horacio Tony L    Relationship: Self    Best call back number: [phone]    Medication needed:   Requested Prescriptions     Pending Prescriptions Disp Refills   • methylphenidate (Ritalin) 10 MG tablet 30 tablet 0     Sig: Take 1 tablet by mouth Daily. For ADD 3 months       When do you need the refill by: 06/10/2021    What additional details did the patient provide when requesting the medication: PATIENT HAS A 2 DAY SUPPLY    Does the patient have less than a 3 day supply:  [x] Yes  [] No    What is the patient's preferred pharmacy: [facility] [address] AT [facility] ([facility] [phone] [facility] [phone] [facility]

## 2021-06-15 DIAGNOSIS — M54.40 BILATERAL LOW BACK PAIN WITH SCIATICA, SCIATICA LATERALITY UNSPECIFIED, UNSPECIFIED CHRONICITY: ICD-10-CM

## 2021-06-15 RX ORDER — GABAPENTIN 100 MG/1
CAPSULE ORAL
Qty: 270 CAPSULE | Refills: 0 | Status: SHIPPED | OUTPATIENT
Start: 2021-06-15 | End: 2022-02-24 | Stop reason: SDUPTHER

## 2021-06-17 DIAGNOSIS — Z98.890 HISTORY OF BACK SURGERY: ICD-10-CM

## 2021-06-17 DIAGNOSIS — M54.16 LUMBAR RADICULOPATHY: ICD-10-CM

## 2021-06-17 DIAGNOSIS — M47.816 OSTEOARTHRITIS OF LUMBAR SPINE, UNSPECIFIED SPINAL OSTEOARTHRITIS COMPLICATION STATUS: ICD-10-CM

## 2021-06-17 DIAGNOSIS — M41.9 SCOLIOSIS OF LUMBAR SPINE, UNSPECIFIED SCOLIOSIS TYPE: ICD-10-CM

## 2021-06-17 DIAGNOSIS — M54.42 CHRONIC RIGHT-SIDED LOW BACK PAIN WITH LEFT-SIDED SCIATICA: ICD-10-CM

## 2021-06-17 DIAGNOSIS — G89.29 CHRONIC RIGHT-SIDED LOW BACK PAIN WITH LEFT-SIDED SCIATICA: ICD-10-CM

## 2021-06-17 RX ORDER — OXYCODONE AND ACETAMINOPHEN 7.5; 325 MG/1; MG/1
1 TABLET ORAL EVERY 8 HOURS PRN
Qty: 60 TABLET | Refills: 0 | Status: SHIPPED | OUTPATIENT
Start: 2021-06-17 | End: 2021-07-28 | Stop reason: SDUPTHER

## 2021-06-17 NOTE — TELEPHONE ENCOUNTER
NEY reviewed and appropriate.  UDS 11/5/2020 reviewed and appropriate.  Needs to make sure he does not miss next appointment.

## 2021-06-17 NOTE — TELEPHONE ENCOUNTER
Medication Refill Request    Date of phone call: 6/17/2021    Medication being requested: oxy/apap 7.5-325 mg sig: take 1 tab q 8 hrs prn  Qty: 60    Date of last visit: 4/29/2021    Date of last refill: 4/29/2021    NEY up to date?: yes    Next Follow up?: 7/7/2021    Any new pertinent information? (i.e, new medication allergies, new use of medications, change in patient's health or condition, non-compliance or inconsistency with prescribing agreement?):

## 2021-07-01 ENCOUNTER — HOSPITAL ENCOUNTER (OUTPATIENT)
Dept: INFUSION THERAPY | Facility: HOSPITAL | Age: 81
Discharge: HOME | End: 2021-07-01
Admitting: PSYCHIATRY & NEUROLOGY

## 2021-07-01 DIAGNOSIS — R20.0 NUMBNESS AND TINGLING OF BOTH FEET: ICD-10-CM

## 2021-07-01 DIAGNOSIS — R20.2 NUMBNESS AND TINGLING OF BOTH FEET: ICD-10-CM

## 2021-07-01 PROBLEM — G62.9 PERIPHERAL NEUROPATHY: Status: ACTIVE | Noted: 2021-07-01

## 2021-07-01 PROCEDURE — 95886 MUSC TEST DONE W/N TEST COMP: CPT

## 2021-07-01 PROCEDURE — 95886 MUSC TEST DONE W/N TEST COMP: CPT | Performed by: PSYCHIATRY & NEUROLOGY

## 2021-07-01 PROCEDURE — 95909 NRV CNDJ TST 5-6 STUDIES: CPT

## 2021-07-01 PROCEDURE — 95909 NRV CNDJ TST 5-6 STUDIES: CPT | Performed by: PSYCHIATRY & NEUROLOGY

## 2021-07-01 NOTE — PROCEDURES
EMG and Nerve Conduction Studies    I.      Instrument used: Neuromax 1002  II.     Please see data sheets for tabular summary of NCS and details on methods, temperatures and lab standards.   III.    EMG muscles tested for upper extremity studies include the deltoid, biceps, triceps, pronator teres, extensor digitorum communis, first dorsal interosseous and abductor pollicis brevis.    IV.   EMG muscles tested for lower extremity studies include the vastus lateralis, tibialis anterior, peroneus longus, medial gastrocnemius and extensor digitorum brevis.    V.    Additional muscles tested as needed.  Paraspinal muscles tested as needed.   VI.   Please see data sheets for tabular summary of EMG findings.   VII. The complete report includes the data sheets.      Indication: Episodic numbness in the feet plus restless leg syndrome  History: 81-year-old white male with episodic numbness in the feet as well as restless leg syndrome.  He has chronic severe back pain with severe scoliosis and compression fracture.  He denies diabetes or thyroid disease history.      Ht: 180.3 cm  Wt: 80.3 kg  HbA1C:   Lab Results   Component Value Date    HGBA1C 5.39 04/04/2017     TSH:   Lab Results   Component Value Date    TSH 1.420 05/11/2021       Technical summary:  Nerve conduction studies were obtained in the left leg with 1 comparison on the right.  Skin temperatures were at least 32 °C measured at the ankles.  Needle examination was obtained on selected muscles in both legs    Results:  1.  Normal left sural sensory distal latency and amplitude.  2.  Normal superficial peroneal sensory latencies bilaterally with low amplitudes of 3.0 µV on the left and 2.0 µV on the right.  3.  Slow left peroneal motor velocity at 32.3 m/s below the knee with normal velocity in the short segment across the fibular head.  Normal distal latency with very low amplitude of 0.400 mV from ankle stimulation.  5.  Slow left tibial motor velocity at 33.5  m/s with low amplitude of 0.3 to 5 mV from ankle stimulation.  (The distal latency was measured at 60 mm since 2 much edema was present at 120 mm for an acceptable response)  5.  Needle examination of selected muscles in both legs showed multiple abnormalities.  There were positive sharp waves in the tibialis anterior, peroneus longus and medial gastrocnemius muscles bilaterally with an increased number of large motor units in the tibialis anterior and peroneus longus with increased firing rates and reduced interference patterns.  The medial gastrocnemius muscles were very fibrous with very poor activation.  The extensor digitorum brevis showed very few motor units with severely reduced recruitment.  The vastus lateralis showed normal insertional activities with a mild increased number of large motor units increased firing rates and reduced interference patterns.  Lumbar paraspinals were not studied due to previous surgery.    Impression:  Complex abnormal study showing evidence of moderate to severe peripheral neuropathy and significant needle exam changes globally in both legs.  I cannot entirely exclude multiple radiculopathies on top of the more diffuse polyneuropathy which could be from L4, L5 and/ or S1.  (A cauda equina syndrome could resemble this on needle exam).  Clinical correlation is suggested.  Study results were discussed with the patient.    Berny Wu M.D.              Dictated utilizing Dragon dictation.

## 2021-07-06 NOTE — ADDENDUM NOTE
Encounter addended by: Cris Mcwilliams RN on: 7/6/2021 9:07 AM   Actions taken: Charge Capture section accepted

## 2021-07-12 RX ORDER — METHYLPHENIDATE HYDROCHLORIDE 10 MG/1
10 TABLET ORAL DAILY
Qty: 30 TABLET | Refills: 0 | Status: SHIPPED | OUTPATIENT
Start: 2021-07-12 | End: 2021-08-13 | Stop reason: SDUPTHER

## 2021-07-12 NOTE — TELEPHONE ENCOUNTER
Caller: Horacio Tony L    Relationship: Self    Best call back number: [phone]    Medication needed:   Requested Prescriptions     Pending Prescriptions Disp Refills   • methylphenidate (Ritalin) 10 MG tablet 30 tablet 0     Sig: Take 1 tablet by mouth Daily. For ADD 3 months       When do you need the refill by: TODAY    What additional details did the patient provide when requesting the medication: PATIENT IS COMPLETELY OUT OF MEDICATION    Does the patient have less than a 3 day supply:  [x] Yes  [] No    What is the patient's preferred pharmacy: [facility] [address] & ([facility] - [phone] [facility] [phone] FX

## 2021-07-20 RX ORDER — POLYETHYLENE GLYCOL 3350 17 G/17G
POWDER, FOR SOLUTION ORAL
Qty: 28 EACH | Refills: 10 | Status: SHIPPED | OUTPATIENT
Start: 2021-07-20 | End: 2022-11-21

## 2021-07-27 NOTE — PROGRESS NOTES
The patient has a pain history of the following:  Chronic low back pain  Lumbar spondylosis   Lumbar disc disease   Scoliosis   Lumbar stenosis   Lumbar radiculopathy     Previous interventions that the patient has received include:   Epidural steroid injections  Possible other interventions      Pain medications include:  Gabapentin  Percocet 7.5-325mg BID  Requip  Tizanidine     Other conservative modalities which the patient reports using include:  Physical Therapy: yes  Neck or back surgery: yes  Past pain management: yes     Past Significant Surgical History:  L2-4 microlumbar laminectomy 9/30/2020  Lumbar surgery 1985    HPI:     CHIEF COMPLAINT Back Pain  F/U back pain- patient states that his pain has remained the same since his last visit.     Subjective   Tony Leonard is a 81 y.o. male  who presents for follow-up.  He has a history of back surgery with Dr. Chan on 9/30/2020.  Since his surgery he's been taking Percocet and Tizanidine for pain.      At his previous visit we discussed Medial branch blocks and Radiofrequency ablation as well as spinal cord stimulator therapy.  In February he saw Dr. Gordon for a second opinion of his back.  His documentation states that Mr. Leonard would need a [id number] fusion but he would recommend avoiding surgery if possible.     Mr. Leonard states he missed his last appointments because his pain was so severe.  He has a heel spur that is being treated and hip/back pain.  He requests a refill of his pain medication today.  He takes it once or twice a day only as needed.  Sometimes he doesn't need it and he doesn't take it.  He denies side effects of the medication and his pain decreases by half when he takes it.  He's currently able to walk with a walker.      He has long-standing bladder issues with incontinence and states that is being treated by a urologist.  He had history of bowel issues that were related to antibiotics.  He denies perineal  "numbness/weakness/numbness in his legs that is worsening.         REVIEW OF PERTINENT MEDICAL DATA  7/1/21 EMG/NCV: Impression:  Complex abnormal study showing evidence of moderate to severe peripheral neuropathy and significant needle exam changes globally in both legs.  I cannot entirely exclude multiple radiculopathies on top of the more diffuse polyneuropathy which could be from L4, L5 and/ or S1.  (A cauda equina syndrome could resemble this on needle exam).  Clinical correlation is suggested.  Study results were discussed with the patient.     Berny Wu M.D.    The following portions of the patient's history were reviewed and updated as appropriate: allergies, current medications, past family history, past medical history, past social history, past surgical history and problem list.    Review of Systems   Constitutional: Positive for activity change (dec) and fatigue (occ). Negative for fever and unexpected weight change.   HENT: Negative for congestion.    Eyes: Negative for visual disturbance.   Respiratory: Negative for cough and shortness of breath.    Cardiovascular: Negative for chest pain.   Gastrointestinal: Negative for constipation and diarrhea.   Genitourinary: Negative for difficulty urinating.   Musculoskeletal: Positive for back pain and gait problem (walker).   Neurological: Negative for dizziness, weakness, light-headedness, numbness and headaches.   Psychiatric/Behavioral: Negative for agitation, sleep disturbance and suicidal ideas. The patient is not nervous/anxious.      I have reviewed and confirmed the accuracy of the ROS as documented by the MA/LPN/RN Anum Bhatt MD    Vitals:    07/28/21 0958   BP: 111/62   Pulse: 65   Resp: 20   Temp: 96.9 °F (36.1 °C)   SpO2: 97%   Weight: 80.7 kg (178 lb)   Height: 180.3 cm (71\")   PainSc:   8   PainLoc: Back         Objective   Physical Exam  Vitals reviewed.   Constitutional:       General: He is not in acute distress.  Pulmonary:      " Effort: Pulmonary effort is normal. No respiratory distress.   Skin:     General: Skin is warm and dry.   Neurological:      General: No focal deficit present.      Mental Status: He is alert.      Deep Tendon Reflexes:      Reflex Scores:       Patellar reflexes are 0 on the right side and 0 on the left side.     Comments: Negative clonus bilaterally   Psychiatric:         Mood and Affect: Mood normal.         Thought Content: Thought content normal.         Controlled Substance Monitoring:     Controlled Substance Agreement Signed: Yes  Date Signed: 11/18/2020  Provider: Anum Bhatt MD  Last [facility] Report Reviewed: 7/28/21  Appropriate for ongoing care?: Yes  Opioid Risk Category: Low    Last drug screen performed on:  11/5/2020 and was appropriate.  Pending today.     Patient denies a history of substance/chemical abuse.      Assessment/Plan   Diagnoses and all orders for this visit:    1. History of back surgery (Primary)  -     oxyCODONE-acetaminophen (Percocet) 7.5-325 MG per tablet; Take 1 tablet by mouth Every 8 (Eight) Hours As Needed for Severe Pain . 1 month prescription  Dispense: 60 tablet; Refill: 0    2. Chronic right-sided low back pain with left-sided sciatica  -     oxyCODONE-acetaminophen (Percocet) 7.5-325 MG per tablet; Take 1 tablet by mouth Every 8 (Eight) Hours As Needed for Severe Pain . 1 month prescription  Dispense: 60 tablet; Refill: 0    3. Lumbar radiculopathy  -     oxyCODONE-acetaminophen (Percocet) 7.5-325 MG per tablet; Take 1 tablet by mouth Every 8 (Eight) Hours As Needed for Severe Pain . 1 month prescription  Dispense: 60 tablet; Refill: 0    4. Scoliosis of lumbar spine, unspecified scoliosis type  -     oxyCODONE-acetaminophen (Percocet) 7.5-325 MG per tablet; Take 1 tablet by mouth Every 8 (Eight) Hours As Needed for Severe Pain . 1 month prescription  Dispense: 60 tablet; Refill: 0    5. Osteoarthritis of lumbar spine, unspecified spinal osteoarthritis complication  status  -     oxyCODONE-acetaminophen (Percocet) 7.5-325 MG per tablet; Take 1 tablet by mouth Every 8 (Eight) Hours As Needed for Severe Pain . 1 month prescription  Dispense: 60 tablet; Refill: 0          - UDS performed today for monitoring purposes.  States last took Percocet this morning.   - Percocet 7.5-325mg #60 refilled today.  Patient appears stable with current regimen. No adverse effects. Regarding continuation of opioids, there is no evidence of aberrant behavior or any red flags.  The patient continues with appropriate response to opioid therapy. ADL's remain intact by self.    - Reviewed cauda equina symptoms and ER precautions.  Provided written instructions as well.   - Tony Leonard reports a pain score of 8.  Given his pain assessment as noted, treatment options were discussed and the following options were decided upon as a follow-up plan to address the patient's pain: prescription for opiod analgesics.      --- Follow-up 1 month office visit.            NEY REPORT    As part of the patient's treatment plan, I am prescribing controlled substances. The patient has been made aware of appropriate use of such medications, including potential risk of somnolence, limited ability to drive and/or work safely, and the potential for dependence or overdose. It has also bee made clear that these medications are for use by this patient only, without concomitant use of alcohol or other substances unless prescribed.     Patient has completed prescribing agreement detailing terms of continued prescribing of controlled substances, including monitoring NEY reports, urine drug screening, and pill counts if necessary. The patient is aware that inappropriate use will results in cessation of prescribing such medications.    As the clinician, I personally reviewed the NEY from 7/28/21 .    History and physical exam exhibit continued safe and appropriate use of controlled substances.    While examining this  patient, I wore protective equipment including a mask, eye shield and gloves.  I washed my hands before and after this patient encounter.  The patient wore a mask throughout the visit as well.     Anum Bhatt MD  Pain Management

## 2021-07-28 ENCOUNTER — OFFICE VISIT (OUTPATIENT)
Dept: PAIN MEDICINE | Facility: CLINIC | Age: 81
End: 2021-07-28

## 2021-07-28 VITALS
TEMPERATURE: 96.9 F | OXYGEN SATURATION: 97 % | WEIGHT: 178 LBS | HEIGHT: 71 IN | DIASTOLIC BLOOD PRESSURE: 62 MMHG | SYSTOLIC BLOOD PRESSURE: 111 MMHG | BODY MASS INDEX: 24.92 KG/M2 | RESPIRATION RATE: 20 BRPM | HEART RATE: 65 BPM

## 2021-07-28 DIAGNOSIS — M54.16 LUMBAR RADICULOPATHY: ICD-10-CM

## 2021-07-28 DIAGNOSIS — M41.9 SCOLIOSIS OF LUMBAR SPINE, UNSPECIFIED SCOLIOSIS TYPE: ICD-10-CM

## 2021-07-28 DIAGNOSIS — G89.29 CHRONIC RIGHT-SIDED LOW BACK PAIN WITH LEFT-SIDED SCIATICA: ICD-10-CM

## 2021-07-28 DIAGNOSIS — M47.816 OSTEOARTHRITIS OF LUMBAR SPINE, UNSPECIFIED SPINAL OSTEOARTHRITIS COMPLICATION STATUS: ICD-10-CM

## 2021-07-28 DIAGNOSIS — M54.42 CHRONIC RIGHT-SIDED LOW BACK PAIN WITH LEFT-SIDED SCIATICA: ICD-10-CM

## 2021-07-28 DIAGNOSIS — Z98.890 HISTORY OF BACK SURGERY: Primary | ICD-10-CM

## 2021-07-28 PROCEDURE — 99214 OFFICE O/P EST MOD 30 MIN: CPT | Performed by: ANESTHESIOLOGY

## 2021-07-28 PROCEDURE — 80305 DRUG TEST PRSMV DIR OPT OBS: CPT | Performed by: ANESTHESIOLOGY

## 2021-07-28 RX ORDER — OXYCODONE AND ACETAMINOPHEN 7.5; 325 MG/1; MG/1
1 TABLET ORAL EVERY 8 HOURS PRN
Qty: 60 TABLET | Refills: 0 | Status: SHIPPED | OUTPATIENT
Start: 2021-07-28 | End: 2021-08-31 | Stop reason: SDUPTHER

## 2021-07-28 NOTE — PATIENT INSTRUCTIONS
"· Loss of control of your bowels/bladder.  · Numbness in your private parts.  · Loss of feeling in your legs and inability to walk.  IF YOU EXPERIENCE THESE SYMPTOMS GO TO THE ER.    Librado neurological surgery (7th ed., pp. [phone]). MAHIN Yu: [place].\"> Librado neurological surgery (7th ed., pp. 6894-9856). MAHIN Yu: [place].\">   Spinal Stenosis    Spinal stenosis is a condition that happens when the spinal canal narrows. The spinal canal is the space between the bones of your spine (vertebrae). This narrowing puts pressure on the spinal cord or nerves. Spinal stenosis can affect the vertebrae in the neck, upper back, and lower back.  This condition can range from mild to severe. In some cases, there are no symptoms.  What are the causes?  This condition is caused by areas of bone pushing into the spinal canal. This condition may be present at birth (congenital), or it may be caused by:  · Slow breakdown of your vertebrae (spinal degeneration). This usually starts around 50 years of age.  · Injury (trauma) to your spine.  · Tumors in your spine.  · Calcium deposits in your spine.  What increases the risk?  The following factors may make you more likely to develop this condition:  · Being older than age 50.  · Having a problem present at birth with an abnormally shaped spine (congenitalspinal deformity), such as scoliosis.  · Having arthritis.  What are the signs or symptoms?  Symptoms of this condition include:  · Pain in the neck or back that is generally worse with activities, particularly when you stand or walk.  · Numbness, tingling, hot or cold sensations, weakness, or tiredness (fatigue) in your leg or legs.  · Pain going from the buttock, down the thigh, and to the calf (sciatica). This can happen in one or both legs.  · Frequent episodes of falling.  · A foot-slapping gait that leads to muscle weakness.  In more severe cases, you may develop:  · Problems having a " bowel movement or urinating.  · Difficulty having sex.  · Loss of feeling in your legs and inability to walk.  Symptoms may come on slowly and get worse over time.  In some cases, there are no symptoms.  How is this diagnosed?  This condition is diagnosed based on your medical history and a physical exam. You will also have tests, such as an MRI, a CT scan, or an X-ray.  How is this treated?  Treatment for this condition often focuses on managing your pain and any other symptoms. Treatment may include:  · Practicing good posture to lessen pressure on your nerves.  · Exercising to strengthen muscles, build endurance, improve balance, and maintain range of motion. This may include physical therapy to restore movement and strength to your back.  · Losing weight, if needed.  · Medicines to reduce inflammation or pain. This may include a medicine that is injected into your spine (steroidinjection).  · Assistive devices, such as a corset or brace.  In some cases, surgery may be needed. The most common procedure is decompression laminectomy. This is done to remove excess bone that puts pressure on your nerve roots.  Follow these instructions at home:  Managing pain, stiffness, and swelling    · Practice good posture. If you were given a brace or a corset, wear it as told by your health care provider.  · Maintain a healthy weight. Talk with your health care provider if you need help losing weight.  · If directed, apply heat to the affected area as often as told by your health care provider. Use the heat source that your health care provider recommends, such as a moist heat pack or a heating pad.  ? Place a towel between your skin and the heat source.  ? Leave the heat on for 20-30 minutes.  ? Remove the heat if your skin turns bright red. This is especially important if you are unable to feel pain, heat, or cold. You may have a greater risk of getting burned.  Activity  · Do all exercises and stretches as told by your  health care provider.  · Do not do any activities that cause pain. Ask your health care provider what activities are safe for you.  · Do not lift anything that is heavier than 10 lb (4.5 kg), or the limit that you are told by your health care provider.  · Return to your normal activities as told by your health care provider. Ask your health care provider what activities are safe for you.  General instructions  · Take over-the-counter and prescription medicines only as told by your health care provider.  · Do not use any products that contain nicotine or tobacco, such as cigarettes, e-cigarettes, and chewing tobacco. If you need help quitting, ask your health care provider.  · Eat a healthy diet. This includes plenty of fruits and vegetables, whole grains, and low-fat (lean) protein.  · Keep all follow-up visits as told by your health care provider. This is important.  Contact a health care provider if:  · Your symptoms do not get better or they get worse.  · You have a fever.  Get help right away if:  · You have new pain or symptoms of severe pain, such as:  ? New or worsening pain in your neck or upper back.  ? Severe pain that cannot be controlled with medicines.  ? A severe headache that gets worse when you stand.  · You are dizzy.  · You have vision problems, such as blurred vision or double vision.  · You have nausea or you vomit.  · You develop new or worsening numbness or tingling in your back or legs.  · You have pain, redness, swelling, or warmth in your arm or leg.  Summary  · Spinal stenosis is a condition that happens when the spinal canal narrows. The spinal canal is the space between the bones of your spine (vertebrae). This narrowing puts pressure on the spinal cord or nerves.  · This condition may be caused by a birth defect, breakdown of your vertebrae, trauma, tumors, or calcium deposits.  · Spinal stenosis can cause numbness, weakness, or pain in the buttocks, neck, back, and legs.  · This  condition is usually diagnosed with your medical history, a physical exam, and tests, such as an MRI, a CT scan, or an X-ray.  This information is not intended to replace advice given to you by your health care provider. Make sure you discuss any questions you have with your health care provider.  Document Revised: 10/15/2020 Document Reviewed: 10/15/2020  Elsevier Patient Education © 2021 [employer]

## 2021-07-29 ENCOUNTER — APPOINTMENT (OUTPATIENT)
Dept: CT IMAGING | Facility: HOSPITAL | Age: 81
End: 2021-07-29

## 2021-07-29 ENCOUNTER — TELEPHONE (OUTPATIENT)
Dept: FAMILY MEDICINE CLINIC | Facility: CLINIC | Age: 81
End: 2021-07-29

## 2021-07-29 ENCOUNTER — HOSPITAL ENCOUNTER (EMERGENCY)
Facility: HOSPITAL | Age: 81
Discharge: HOME | End: 2021-07-29
Attending: EMERGENCY MEDICINE | Admitting: EMERGENCY MEDICINE

## 2021-07-29 VITALS
HEART RATE: 60 BPM | WEIGHT: 178 LBS | DIASTOLIC BLOOD PRESSURE: 63 MMHG | BODY MASS INDEX: 24.92 KG/M2 | SYSTOLIC BLOOD PRESSURE: 124 MMHG | OXYGEN SATURATION: 95 % | RESPIRATION RATE: 18 BRPM | TEMPERATURE: 97.1 F | HEIGHT: 71 IN

## 2021-07-29 DIAGNOSIS — J96.21 ACUTE ON CHRONIC RESPIRATORY FAILURE WITH HYPOXIA (HCC): ICD-10-CM

## 2021-07-29 DIAGNOSIS — R10.9 LEFT FLANK PAIN: Primary | ICD-10-CM

## 2021-07-29 DIAGNOSIS — J44.9 CHRONIC OBSTRUCTIVE PULMONARY DISEASE, UNSPECIFIED COPD TYPE (HCC): ICD-10-CM

## 2021-07-29 LAB
ALBUMIN SERPL-MCNC: 3.9 G/DL (ref 3.5–5.2)
ALBUMIN/GLOB SERPL: 1.4 G/DL
ALP SERPL-CCNC: 74 U/L (ref 39–117)
ALT SERPL W P-5'-P-CCNC: 17 U/L (ref 1–41)
ANION GAP SERPL CALCULATED.3IONS-SCNC: 8.5 MMOL/L (ref 5–15)
AST SERPL-CCNC: 20 U/L (ref 1–40)
BASOPHILS # BLD AUTO: 0.05 10*3/MM3 (ref 0–0.2)
BASOPHILS NFR BLD AUTO: 0.8 % (ref 0–1.5)
BILIRUB SERPL-MCNC: 0.4 MG/DL (ref 0–1.2)
BILIRUB UR QL STRIP: NEGATIVE
BUN SERPL-MCNC: 29 MG/DL (ref 8–23)
BUN/CREAT SERPL: 27.1 (ref 7–25)
CALCIUM SPEC-SCNC: 8.9 MG/DL (ref 8.6–10.5)
CHLORIDE SERPL-SCNC: 104 MMOL/L (ref 98–107)
CLARITY UR: CLEAR
CO2 SERPL-SCNC: 25.5 MMOL/L (ref 22–29)
COLOR UR: ABNORMAL
CREAT SERPL-MCNC: 1.07 MG/DL (ref 0.76–1.27)
DEPRECATED RDW RBC AUTO: 42.7 FL (ref 37–54)
EOSINOPHIL # BLD AUTO: 0.64 10*3/MM3 (ref 0–0.4)
EOSINOPHIL NFR BLD AUTO: 10 % (ref 0.3–6.2)
ERYTHROCYTE [DISTWIDTH] IN BLOOD BY AUTOMATED COUNT: 12.1 % (ref 12.3–15.4)
GFR SERPL CREATININE-BSD FRML MDRD: 66 ML/MIN/1.73
GLOBULIN UR ELPH-MCNC: 2.8 GM/DL
GLUCOSE SERPL-MCNC: 110 MG/DL (ref 65–99)
GLUCOSE UR STRIP-MCNC: NEGATIVE MG/DL
HCT VFR BLD AUTO: 37.1 % (ref 37.5–51)
HGB BLD-MCNC: 12.4 G/DL (ref 13–17.7)
HGB UR QL STRIP.AUTO: NEGATIVE
HOLD SPECIMEN: NORMAL
HOLD SPECIMEN: NORMAL
IMM GRANULOCYTES # BLD AUTO: 0.01 10*3/MM3 (ref 0–0.05)
IMM GRANULOCYTES NFR BLD AUTO: 0.2 % (ref 0–0.5)
KETONES UR QL STRIP: NEGATIVE
LEUKOCYTE ESTERASE UR QL STRIP.AUTO: NEGATIVE
LIPASE SERPL-CCNC: 21 U/L (ref 13–60)
LYMPHOCYTES # BLD AUTO: 2.03 10*3/MM3 (ref 0.7–3.1)
LYMPHOCYTES NFR BLD AUTO: 31.9 % (ref 19.6–45.3)
MCH RBC QN AUTO: 32.4 PG (ref 26.6–33)
MCHC RBC AUTO-ENTMCNC: 33.4 G/DL (ref 31.5–35.7)
MCV RBC AUTO: 96.9 FL (ref 79–97)
MONOCYTES # BLD AUTO: 0.56 10*3/MM3 (ref 0.1–0.9)
MONOCYTES NFR BLD AUTO: 8.8 % (ref 5–12)
NEUTROPHILS NFR BLD AUTO: 3.08 10*3/MM3 (ref 1.7–7)
NEUTROPHILS NFR BLD AUTO: 48.3 % (ref 42.7–76)
NITRITE UR QL STRIP: NEGATIVE
NRBC BLD AUTO-RTO: 0 /100 WBC (ref 0–0.2)
PH UR STRIP.AUTO: <=5 [PH] (ref 5–8)
PLATELET # BLD AUTO: 222 10*3/MM3 (ref 140–450)
PMV BLD AUTO: 9.2 FL (ref 6–12)
POTASSIUM SERPL-SCNC: 4 MMOL/L (ref 3.5–5.2)
PROT SERPL-MCNC: 6.7 G/DL (ref 6–8.5)
PROT UR QL STRIP: NEGATIVE
RBC # BLD AUTO: 3.83 10*6/MM3 (ref 4.14–5.8)
SODIUM SERPL-SCNC: 138 MMOL/L (ref 136–145)
SP GR UR STRIP: 1.02 (ref 1–1.03)
UROBILINOGEN UR QL STRIP: ABNORMAL
WBC # BLD AUTO: 6.37 10*3/MM3 (ref 3.4–10.8)
WHOLE BLOOD HOLD SPECIMEN: NORMAL

## 2021-07-29 PROCEDURE — 80053 COMPREHEN METABOLIC PANEL: CPT

## 2021-07-29 PROCEDURE — 83690 ASSAY OF LIPASE: CPT

## 2021-07-29 PROCEDURE — 36415 COLL VENOUS BLD VENIPUNCTURE: CPT

## 2021-07-29 PROCEDURE — 74176 CT ABD & PELVIS W/O CONTRAST: CPT

## 2021-07-29 PROCEDURE — 81003 URINALYSIS AUTO W/O SCOPE: CPT

## 2021-07-29 PROCEDURE — 99283 EMERGENCY DEPT VISIT LOW MDM: CPT

## 2021-07-29 PROCEDURE — 85025 COMPLETE CBC W/AUTO DIFF WBC: CPT

## 2021-07-29 RX ORDER — SODIUM CHLORIDE 9 MG/ML
125 INJECTION, SOLUTION INTRAVENOUS CONTINUOUS
Status: DISCONTINUED | OUTPATIENT
Start: 2021-07-29 | End: 2021-07-29

## 2021-07-29 RX ORDER — SODIUM CHLORIDE 0.9 % (FLUSH) 0.9 %
10 SYRINGE (ML) INJECTION AS NEEDED
Status: DISCONTINUED | OUTPATIENT
Start: 2021-07-29 | End: 2021-07-29 | Stop reason: HOSPADM

## 2021-07-29 NOTE — TELEPHONE ENCOUNTER
Caller: Tony Leonard    Relationship to patient: Self    Best call back number: [phone]    Chief complaint: SEVERE LEFT SIDE PAIN    Type of visit: SAME DAY    Requested date: 7/29/21    If rescheduling, when is the original appointment:     Additional notes:

## 2021-07-30 ENCOUNTER — PATIENT OUTREACH (OUTPATIENT)
Dept: CASE MANAGEMENT | Facility: OTHER | Age: 81
End: 2021-07-30

## 2021-07-30 NOTE — OUTREACH NOTE
"Ambulatory Case Management Note    Patient Outreach    Spoke with patient briefly, he reports back pain still persists \"they think it is muscle strain\" Patient is using several OTC preparations (icy hot, biofreeze) to help with the pain. He has also tried ice/heat. Reviewed all upcoming appointments with patient & need to f/u with PCP. Patient has been in contact with Dr. Mattson for \"bowel issues\" he is having 2-3 formed BM's per day. Explained role of Care Advisor and contact information given to patient.Patient is aware phone number to the 24/7 Nurse Line ([phone]) & [employer]-19 Hotline (1-382.520.4711) found on AVS from ED visit.Patient appreciative of phone call & declined to participate in Care Advising program. No needs identified at this time. MyChart is declined and ACP completed.        Marisol Tirado RN  Ambulatory Case Management    7/30/2021, 13:16 EDT    "

## 2021-08-13 RX ORDER — METHYLPHENIDATE HYDROCHLORIDE 10 MG/1
10 TABLET ORAL DAILY
Qty: 30 TABLET | Refills: 0 | Status: SHIPPED | OUTPATIENT
Start: 2021-08-13 | End: 2021-09-10 | Stop reason: SDUPTHER

## 2021-08-13 NOTE — TELEPHONE ENCOUNTER
Caller: Tony Leonard    Relationship: Self    Best call back number: [phone]     Medication needed:   Requested Prescriptions     Pending Prescriptions Disp Refills   • methylphenidate (Ritalin) 10 MG tablet 30 tablet 0     Sig: Take 1 tablet by mouth Daily. For ADD 3 months       When do you need the refill by: 08/13/21    Does the patient have less than a 3 day supply:  [x] Yes  [] No    What is the patient's preferred pharmacy: [facility] [address] & ([facility] [phone] [facility] [phone] FX

## 2021-08-30 NOTE — PROGRESS NOTES
The patient has a pain history of the following:  Chronic low back pain  Lumbar spondylosis   Lumbar disc disease   Scoliosis   Lumbar stenosis   Lumbar radiculopathy     Previous interventions that the patient has received include:   Epidural steroid injections  Possible other interventions      Pain medications include:  Gabapentin  Percocet 7.5-325mg BID  Requip  Tizanidine     Other conservative modalities which the patient reports using include:  Physical Therapy: yes  Neck or back surgery: yes  Past pain management: yes     Past Significant Surgical History:  L2-4 microlumbar laminectomy 9/30/2020  Lumbar surgery 1985    HPI:     CHIEF COMPLAINT Back Pain  F/U back pain- patient states that his pain has improved since his last visit.     Subjective   Tony Leonard is a 81 y.o. male  who presents for follow-up.  He has a history of back surgery with Dr. Chan on 9/30/2020.  Since his surgery he's been taking Percocet and Tizanidine for pain.  He is not interested in Medial branch blocks/Radiofrequency ablation or spinal cord stimulator therapy.    Mr. Leonard states that he is doing much better today than he was at his last visit.  He's been taking his muscle relaxants and oxycodone without side effects.  He states he does not like to take them, but they do help.  He's also been using a homeopathic remedy called Asher Carbonicum, which he feels helps.         PEG Assessment   What number best describes your pain on average in the past week?5  What number best describes how, during the past week, pain has interfered with your enjoyment of life?4  What number best describes how, during the past week, pain has interfered with your general activity?  4    REVIEW OF PERTINENT MEDICAL DATA  7/1/21 EMG/NCV: Impression:  Complex abnormal study showing evidence of moderate to severe peripheral neuropathy and significant needle exam changes globally in both legs.  I cannot entirely exclude multiple radiculopathies on  "top of the more diffuse polyneuropathy which could be from L4, L5 and/ or S1.  (A cauda equina syndrome could resemble this on needle exam).  Clinical correlation is suggested.  Study results were discussed with the patient.     Berny Wu M.D.                      The following portions of the patient's history were reviewed and updated as appropriate: allergies, current medications, past family history, past medical history, past social history, past surgical history and problem list.    Review of Systems   Constitutional: Positive for fatigue. Negative for activity change, chills and fever.   HENT: Negative for congestion.    Eyes: Negative for visual disturbance.   Respiratory: Negative for chest tightness and shortness of breath.    Cardiovascular: Negative for chest pain.   Gastrointestinal: Negative for abdominal pain, constipation and diarrhea.   Genitourinary: Negative for difficulty urinating and dysuria.   Musculoskeletal: Positive for back pain.   Neurological: Positive for weakness (low back ) and numbness (finger tips). Negative for dizziness, light-headedness and headaches.   Psychiatric/Behavioral: Positive for agitation (occ). Negative for sleep disturbance and suicidal ideas. The patient is not nervous/anxious.      I have reviewed and confirmed the accuracy of the ROS as documented by the MA/LPN/RN Anum Bhatt MD    Vitals:    08/31/21 0938   BP: 117/66   Pulse: 87   Resp: 16   Temp: 98.1 °F (36.7 °C)   SpO2: 93%   Weight: 78.9 kg (174 lb)   Height: 180.3 cm (71\")   PainSc:   4   PainLoc: Back         Objective   Physical Exam  Vitals reviewed.   Constitutional:       General: He is not in acute distress.  Pulmonary:      Effort: Pulmonary effort is normal. No respiratory distress.   Musculoskeletal:      Right lower leg: Edema present.      Left lower leg: Edema present.      Comments: Ambulation: With a walker  Lumbar Exam:  Appearance: Scoliotic curve present and scarring present  Range " of Motion: diminished range with pain  Palpated over lumbosacral paravertebral regions and transverse processes with positive tenderness appreciated, Bilateral.    Skin:     General: Skin is warm and dry.   Neurological:      Mental Status: He is alert.   Psychiatric:         Mood and Affect: Mood normal.         Thought Content: Thought content normal.         Controlled Substance Monitoring:     Controlled Substance Agreement Signed: Yes  Date Signed: 11/18/2020  Provider: Anum Bhatt MD  Last [facility] Report Reviewed: 8/31/21  Appropriate for ongoing care?: Yes  Opioid Risk Category: Low    Last drug screen performed on:  7/28/21 and was appropriate    Patient denies a history of substance/chemical abuse.      Assessment/Plan   Diagnoses and all orders for this visit:    1. History of back surgery  -     oxyCODONE-acetaminophen (Percocet) 7.5-325 MG per tablet; Take 1 tablet by mouth Every 8 (Eight) Hours As Needed for Severe Pain . 30 day prescription  Dispense: 60 tablet; Refill: 0  -     oxyCODONE-acetaminophen (PERCOCET) 7.5-325 MG per tablet; Take 1 tablet by mouth Every 8 (Eight) Hours As Needed for Moderate Pain . 30 day prescription.  Dispense: 60 tablet; Refill: 0    2. Chronic right-sided low back pain with left-sided sciatica  -     oxyCODONE-acetaminophen (Percocet) 7.5-325 MG per tablet; Take 1 tablet by mouth Every 8 (Eight) Hours As Needed for Severe Pain . 30 day prescription  Dispense: 60 tablet; Refill: 0  -     oxyCODONE-acetaminophen (PERCOCET) 7.5-325 MG per tablet; Take 1 tablet by mouth Every 8 (Eight) Hours As Needed for Moderate Pain . 30 day prescription.  Dispense: 60 tablet; Refill: 0    3. Lumbar radiculopathy  -     oxyCODONE-acetaminophen (Percocet) 7.5-325 MG per tablet; Take 1 tablet by mouth Every 8 (Eight) Hours As Needed for Severe Pain . 30 day prescription  Dispense: 60 tablet; Refill: 0  -     oxyCODONE-acetaminophen (PERCOCET) 7.5-325 MG per tablet; Take 1 tablet by mouth  Every 8 (Eight) Hours As Needed for Moderate Pain . 30 day prescription.  Dispense: 60 tablet; Refill: 0    4. Scoliosis of lumbar spine, unspecified scoliosis type  -     oxyCODONE-acetaminophen (Percocet) 7.5-325 MG per tablet; Take 1 tablet by mouth Every 8 (Eight) Hours As Needed for Severe Pain . 30 day prescription  Dispense: 60 tablet; Refill: 0  -     oxyCODONE-acetaminophen (PERCOCET) 7.5-325 MG per tablet; Take 1 tablet by mouth Every 8 (Eight) Hours As Needed for Moderate Pain . 30 day prescription.  Dispense: 60 tablet; Refill: 0    5. Osteoarthritis of lumbar spine, unspecified spinal osteoarthritis complication status  -     oxyCODONE-acetaminophen (Percocet) 7.5-325 MG per tablet; Take 1 tablet by mouth Every 8 (Eight) Hours As Needed for Severe Pain . 30 day prescription  Dispense: 60 tablet; Refill: 0  -     oxyCODONE-acetaminophen (PERCOCET) 7.5-325 MG per tablet; Take 1 tablet by mouth Every 8 (Eight) Hours As Needed for Moderate Pain . 30 day prescription.  Dispense: 60 tablet; Refill: 0          - UDS confirmation reviewed and appropriate.    - Oxycodone-acetaminophen #60 refilled today.  Patient appears stable with current regimen. No adverse effects. Regarding continuation of opioids, there is no evidence of aberrant behavior or any red flags.  The patient continues with appropriate response to opioid therapy. ADL's remain intact by self.    - Again discussed that we could consider back interventions to help with his pain.  He's not interested at this time.   - Educated on lumbar stenosis and ER precautions.       --- Follow-up 2 month office visit.  He will call if anything changes prior to his next visit.            NEY REPORT    As part of the patient's treatment plan, I am prescribing controlled substances. The patient has been made aware of appropriate use of such medications, including potential risk of somnolence, limited ability to drive and/or work safely, and the potential for  dependence or overdose. It has also bee made clear that these medications are for use by this patient only, without concomitant use of alcohol or other substances unless prescribed.     Patient has completed prescribing agreement detailing terms of continued prescribing of controlled substances, including monitoring NEY reports, urine drug screening, and pill counts if necessary. The patient is aware that inappropriate use will results in cessation of prescribing such medications.    As the clinician, I personally reviewed the NEY from 8/31/21 .    History and physical exam exhibit continued safe and appropriate use of controlled substances.    While examining this patient, I wore protective equipment including a mask, eye shield and gloves.  I washed my hands before and after this patient encounter.  The patient wore a mask throughout the visit as well.     Anum Bhatt MD  Pain Management

## 2021-08-31 ENCOUNTER — OFFICE VISIT (OUTPATIENT)
Dept: PAIN MEDICINE | Facility: CLINIC | Age: 81
End: 2021-08-31

## 2021-08-31 VITALS
BODY MASS INDEX: 24.36 KG/M2 | RESPIRATION RATE: 16 BRPM | SYSTOLIC BLOOD PRESSURE: 117 MMHG | HEART RATE: 87 BPM | WEIGHT: 174 LBS | HEIGHT: 71 IN | TEMPERATURE: 98.1 F | OXYGEN SATURATION: 93 % | DIASTOLIC BLOOD PRESSURE: 66 MMHG

## 2021-08-31 DIAGNOSIS — M41.9 SCOLIOSIS OF LUMBAR SPINE, UNSPECIFIED SCOLIOSIS TYPE: ICD-10-CM

## 2021-08-31 DIAGNOSIS — M54.16 LUMBAR RADICULOPATHY: ICD-10-CM

## 2021-08-31 DIAGNOSIS — M54.42 CHRONIC RIGHT-SIDED LOW BACK PAIN WITH LEFT-SIDED SCIATICA: ICD-10-CM

## 2021-08-31 DIAGNOSIS — Z98.890 HISTORY OF BACK SURGERY: ICD-10-CM

## 2021-08-31 DIAGNOSIS — M47.816 OSTEOARTHRITIS OF LUMBAR SPINE, UNSPECIFIED SPINAL OSTEOARTHRITIS COMPLICATION STATUS: ICD-10-CM

## 2021-08-31 DIAGNOSIS — G89.29 CHRONIC RIGHT-SIDED LOW BACK PAIN WITH LEFT-SIDED SCIATICA: ICD-10-CM

## 2021-08-31 PROCEDURE — 99213 OFFICE O/P EST LOW 20 MIN: CPT | Performed by: ANESTHESIOLOGY

## 2021-08-31 RX ORDER — OXYCODONE AND ACETAMINOPHEN 7.5; 325 MG/1; MG/1
1 TABLET ORAL EVERY 8 HOURS PRN
Qty: 60 TABLET | Refills: 0 | Status: SHIPPED | OUTPATIENT
Start: 2021-08-31 | End: 2021-11-02 | Stop reason: SDUPTHER

## 2021-08-31 RX ORDER — OXYCODONE AND ACETAMINOPHEN 7.5; 325 MG/1; MG/1
1 TABLET ORAL EVERY 8 HOURS PRN
Qty: 60 TABLET | Refills: 0 | Status: SHIPPED | OUTPATIENT
Start: 2021-09-30 | End: 2021-09-08 | Stop reason: SDUPTHER

## 2021-08-31 NOTE — PATIENT INSTRUCTIONS
"Librado neurological surgery (7th ed., pp. 3554-9958). MAHIN Yu: Elsevier.\"> Librado neurological surgery (7th ed., pp. 8549-5888). MAHIN Yu: Elsevier.\">   Spinal Stenosis    Spinal stenosis is a condition that happens when the spinal canal narrows. The spinal canal is the space between the bones of your spine (vertebrae). This narrowing puts pressure on the spinal cord or nerves. Spinal stenosis can affect the vertebrae in the neck, upper back, and lower back.  This condition can range from mild to severe. In some cases, there are no symptoms.  What are the causes?  This condition is caused by areas of bone pushing into the spinal canal. This condition may be present at birth (congenital), or it may be caused by:  · Slow breakdown of your vertebrae (spinal degeneration). This usually starts around 50 years of age.  · Injury (trauma) to your spine.  · Tumors in your spine.  · Calcium deposits in your spine.  What increases the risk?  The following factors may make you more likely to develop this condition:  · Being older than age 50.  · Having a problem present at birth with an abnormally shaped spine (congenitalspinal deformity), such as scoliosis.  · Having arthritis.  What are the signs or symptoms?  Symptoms of this condition include:  · Pain in the neck or back that is generally worse with activities, particularly when you stand or walk.  · Numbness, tingling, hot or cold sensations, weakness, or tiredness (fatigue) in your leg or legs.  · Pain going from the buttock, down the thigh, and to the calf (sciatica). This can happen in one or both legs.  · Frequent episodes of falling.  · A foot-slapping gait that leads to muscle weakness.  In more severe cases, you may develop:  · Problems having a bowel movement or urinating.  · Difficulty having sex.  · Loss of feeling in your legs and inability to walk.  Symptoms may come on slowly and get worse over time.  In some cases, there " are no symptoms.  How is this diagnosed?  This condition is diagnosed based on your medical history and a physical exam. You will also have tests, such as an MRI, a CT scan, or an X-ray.  How is this treated?  Treatment for this condition often focuses on managing your pain and any other symptoms. Treatment may include:  · Practicing good posture to lessen pressure on your nerves.  · Exercising to strengthen muscles, build endurance, improve balance, and maintain range of motion. This may include physical therapy to restore movement and strength to your back.  · Losing weight, if needed.  · Medicines to reduce inflammation or pain. This may include a medicine that is injected into your spine (steroidinjection).  · Assistive devices, such as a corset or brace.  In some cases, surgery may be needed. The most common procedure is decompression laminectomy. This is done to remove excess bone that puts pressure on your nerve roots.  Follow these instructions at home:  Managing pain, stiffness, and swelling    · Practice good posture. If you were given a brace or a corset, wear it as told by your health care provider.  · Maintain a healthy weight. Talk with your health care provider if you need help losing weight.  · If directed, apply heat to the affected area as often as told by your health care provider. Use the heat source that your health care provider recommends, such as a moist heat pack or a heating pad.  ? Place a towel between your skin and the heat source.  ? Leave the heat on for 20-30 minutes.  ? Remove the heat if your skin turns bright red. This is especially important if you are unable to feel pain, heat, or cold. You may have a greater risk of getting burned.  Activity  · Do all exercises and stretches as told by your health care provider.  · Do not do any activities that cause pain. Ask your health care provider what activities are safe for you.  · Do not lift anything that is heavier than 10 lb (4.5 kg),  or the limit that you are told by your health care provider.  · Return to your normal activities as told by your health care provider. Ask your health care provider what activities are safe for you.  General instructions  · Take over-the-counter and prescription medicines only as told by your health care provider.  · Do not use any products that contain nicotine or tobacco, such as cigarettes, e-cigarettes, and chewing tobacco. If you need help quitting, ask your health care provider.  · Eat a healthy diet. This includes plenty of fruits and vegetables, whole grains, and low-fat (lean) protein.  · Keep all follow-up visits as told by your health care provider. This is important.  Contact a health care provider if:  · Your symptoms do not get better or they get worse.  · You have a fever.  Get help right away if:  · You have new pain or symptoms of severe pain, such as:  ? New or worsening pain in your neck or upper back.  ? Severe pain that cannot be controlled with medicines.  ? A severe headache that gets worse when you stand.  · You are dizzy.  · You have vision problems, such as blurred vision or double vision.  · You have nausea or you vomit.  · You develop new or worsening numbness or tingling in your back or legs.  · You have pain, redness, swelling, or warmth in your arm or leg.  Summary  · Spinal stenosis is a condition that happens when the spinal canal narrows. The spinal canal is the space between the bones of your spine (vertebrae). This narrowing puts pressure on the spinal cord or nerves.  · This condition may be caused by a birth defect, breakdown of your vertebrae, trauma, tumors, or calcium deposits.  · Spinal stenosis can cause numbness, weakness, or pain in the buttocks, neck, back, and legs.  · This condition is usually diagnosed with your medical history, a physical exam, and tests, such as an MRI, a CT scan, or an X-ray.  This information is not intended to replace advice given to you by your  health care provider. Make sure you discuss any questions you have with your health care provider.  Document Revised: 10/15/2020 Document Reviewed: 10/15/2020  Elsevier Patient Education © 2021 Elsevier Inc.

## 2021-09-08 ENCOUNTER — OFFICE VISIT (OUTPATIENT)
Dept: FAMILY MEDICINE CLINIC | Facility: CLINIC | Age: 81
End: 2021-09-08

## 2021-09-08 VITALS
TEMPERATURE: 98.2 F | OXYGEN SATURATION: 98 % | WEIGHT: 169 LBS | DIASTOLIC BLOOD PRESSURE: 64 MMHG | SYSTOLIC BLOOD PRESSURE: 102 MMHG | HEART RATE: 99 BPM | HEIGHT: 71 IN | BODY MASS INDEX: 23.66 KG/M2

## 2021-09-08 DIAGNOSIS — J42 CHRONIC BRONCHITIS, UNSPECIFIED CHRONIC BRONCHITIS TYPE (HCC): ICD-10-CM

## 2021-09-08 DIAGNOSIS — M15.9 PRIMARY OSTEOARTHRITIS INVOLVING MULTIPLE JOINTS: ICD-10-CM

## 2021-09-08 DIAGNOSIS — E78.5 DYSLIPIDEMIA: Primary | ICD-10-CM

## 2021-09-08 PROBLEM — M72.2 PLANTAR FASCIITIS: Status: ACTIVE | Noted: 2021-09-08

## 2021-09-08 PROCEDURE — 99214 OFFICE O/P EST MOD 30 MIN: CPT | Performed by: INTERNAL MEDICINE

## 2021-09-08 RX ORDER — AZITHROMYCIN 250 MG/1
TABLET, FILM COATED ORAL
COMMUNITY
Start: 2021-08-02 | End: 2021-11-02

## 2021-09-08 NOTE — PROGRESS NOTES
Subjective   Tony Leonard is a 81 y.o. male.     Vitals:    09/08/21 1359   BP: 102/64   Pulse: 99   Temp: 98.2 °F (36.8 °C)   SpO2: 98%      Body mass index is 23.57 kg/m².     History of Present Illness   Patient was seen for dyslipidemia.  Patient is using diet exercise and Colestid 5 g twice daily.  Triglycerides 73, HDL 74, .  Patient will continue present treatment.  Patient does have chronic bronchitis and uses Anoro as maintenance therapy.  Patient also has albuterol mini neb solution for a rescue treatment.  Patient occasionally has to use his mini neb.  Patient's osteoarthritis treated with low impact exercise and oxycodone 7.5/325 mg every 8 hours.  Patient is going to the pain clinic and symptoms are partially relieved with this treatment.  Patient also uses low impact exercise and also rides his bicycle.  Patient will continue present treatment.    Patient in for neurology for restless leg.    Dictated utilizing Dragon dictation. If there are questions or for further clarification, please contact me.  The following portions of the patient's history were reviewed and updated as appropriate: allergies, current medications, past family history, past medical history, past social history, past surgical history and problem list.    Review of Systems   Constitutional: Negative for fatigue and fever.   HENT: Positive for congestion. Negative for trouble swallowing.    Eyes: Negative for discharge and visual disturbance.   Respiratory: Negative for choking and shortness of breath.    Cardiovascular: Negative for chest pain and palpitations.   Gastrointestinal: Negative for abdominal pain and blood in stool.   Endocrine: Negative.    Genitourinary: Negative for genital sores and hematuria.   Musculoskeletal: Positive for back pain, gait problem and joint swelling.   Skin: Negative for color change, pallor, rash and wound.   Allergic/Immunologic: Positive for environmental allergies. Negative for  immunocompromised state.   Neurological: Negative for facial asymmetry and speech difficulty.   Psychiatric/Behavioral: Negative for hallucinations and suicidal ideas.       Objective   Physical Exam  Vitals and nursing note reviewed.   Constitutional:       Appearance: Normal appearance. He is well-developed.   HENT:      Head: Normocephalic and atraumatic.      Nose: Nose normal.      Mouth/Throat:      Mouth: Mucous membranes are moist.      Pharynx: Oropharynx is clear.   Eyes:      Extraocular Movements: Extraocular movements intact.      Conjunctiva/sclera: Conjunctivae normal.      Pupils: Pupils are equal, round, and reactive to light.   Cardiovascular:      Rate and Rhythm: Normal rate and regular rhythm.      Heart sounds: Normal heart sounds. No murmur heard.   No friction rub. No gallop.    Pulmonary:      Effort: Pulmonary effort is normal. No respiratory distress.      Breath sounds: Normal breath sounds. No stridor. No wheezing, rhonchi or rales.   Chest:      Chest wall: No tenderness.   Abdominal:      General: Bowel sounds are normal.      Palpations: Abdomen is soft.   Musculoskeletal:         General: Swelling, tenderness and deformity present. Normal range of motion.      Cervical back: Normal range of motion and neck supple.   Skin:     General: Skin is warm and dry.   Neurological:      General: No focal deficit present.      Mental Status: He is alert and oriented to person, place, and time. Mental status is at baseline.   Psychiatric:         Mood and Affect: Mood normal.         Behavior: Behavior normal.         Thought Content: Thought content normal.         Judgment: Judgment normal.         Assessment/Plan #1 continue present lipid treatment #2 continue oxycodone with low impact exercise #3 continue Anoro with albuterol rescue  Problems Addressed this Visit        Cardiac and Vasculature    Dyslipidemia - Primary       Musculoskeletal and Injuries    Primary osteoarthritis involving  multiple joints       Pulmonary and Pneumonias    COPD (chronic obstructive pulmonary disease) ([facility])      Diagnoses     Diagnosis Codes Comments    Dyslipidemia    -  Primary ICD-10-CM: E78.5  ICD-9-CM: 272.4     Chronic bronchitis, unspecified chronic bronchitis type ([facility])     ICD-10-CM: J42  ICD-9-CM: 491.9     Primary osteoarthritis involving multiple joints     ICD-10-CM: M89.49  ICD-9-CM: 715.98

## 2021-09-10 RX ORDER — METHYLPHENIDATE HYDROCHLORIDE 10 MG/1
10 TABLET ORAL DAILY
Qty: 30 TABLET | Refills: 0 | Status: SHIPPED | OUTPATIENT
Start: 2021-09-10 | End: 2021-10-14 | Stop reason: SDUPTHER

## 2021-09-10 NOTE — TELEPHONE ENCOUNTER
Caller: Tony Leonard    Relationship: Self    Best call back number:     Medication needed:   Requested Prescriptions     Pending Prescriptions Disp Refills   • methylphenidate (Ritalin) 10 MG tablet 30 tablet 0     Sig: Take 1 tablet by mouth Daily. For ADD 3 months           Does the patient have less than a 3 day supply:  [] Yes  [x] No    What is the patient's preferred pharmacy: [address] AT [facility] & ([facility] [phone] [facility] [phone] FX

## 2021-09-14 RX ORDER — ROPINIROLE 0.5 MG/1
TABLET, FILM COATED ORAL
Qty: 90 TABLET | Refills: 7 | Status: SHIPPED | OUTPATIENT
Start: 2021-09-14 | End: 2022-08-29

## 2021-10-14 ENCOUNTER — TELEPHONE (OUTPATIENT)
Dept: FAMILY MEDICINE CLINIC | Facility: CLINIC | Age: 81
End: 2021-10-14

## 2021-10-14 RX ORDER — METHYLPHENIDATE HYDROCHLORIDE 10 MG/1
10 TABLET ORAL DAILY
Qty: 30 TABLET | Refills: 0 | Status: CANCELLED | OUTPATIENT
Start: 2021-10-14

## 2021-10-14 RX ORDER — METHYLPHENIDATE HYDROCHLORIDE 10 MG/1
10 TABLET ORAL DAILY
Qty: 30 TABLET | Refills: 0 | Status: SHIPPED | OUTPATIENT
Start: 2021-10-14 | End: 2021-11-11 | Stop reason: SDUPTHER

## 2021-10-14 NOTE — TELEPHONE ENCOUNTER
Caller: Tony Leonard    Relationship: Self      Medication requested (name and dosage):   methylphenidate (Ritalin) 10 MG tablet    Pharmacy where request should be sent:   [facility] [address] AT [facility] & ([place] [facility] - [phone] [facility] [phone] FX    Additional details provided by patient: PATIENT STATES HE HAS ONE LEFT     Best call back number: [phone]    Does the patient have less than a 3 day supply:  [x] Yes  [] No    Cherelle Pineda Rep   10/14/21 13:16 EDT

## 2021-10-29 RX ORDER — KETOCONAZOLE 20 MG/ML
SHAMPOO TOPICAL
Qty: 120 ML | Refills: 3 | Status: ON HOLD | OUTPATIENT
Start: 2021-10-29 | End: 2022-11-14

## 2021-11-02 ENCOUNTER — OFFICE VISIT (OUTPATIENT)
Dept: PAIN MEDICINE | Facility: CLINIC | Age: 81
End: 2021-11-02

## 2021-11-02 VITALS
OXYGEN SATURATION: 95 % | HEIGHT: 71 IN | DIASTOLIC BLOOD PRESSURE: 79 MMHG | SYSTOLIC BLOOD PRESSURE: 149 MMHG | RESPIRATION RATE: 18 BRPM | TEMPERATURE: 96.5 F | BODY MASS INDEX: 24.05 KG/M2 | WEIGHT: 171.8 LBS | HEART RATE: 80 BPM

## 2021-11-02 DIAGNOSIS — Z98.890 HISTORY OF BACK SURGERY: Primary | ICD-10-CM

## 2021-11-02 DIAGNOSIS — M79.671 PAIN IN BOTH FEET: ICD-10-CM

## 2021-11-02 DIAGNOSIS — G89.29 CHRONIC RIGHT-SIDED LOW BACK PAIN WITH LEFT-SIDED SCIATICA: ICD-10-CM

## 2021-11-02 DIAGNOSIS — M79.672 PAIN IN BOTH FEET: ICD-10-CM

## 2021-11-02 DIAGNOSIS — M54.16 LUMBAR RADICULOPATHY: ICD-10-CM

## 2021-11-02 DIAGNOSIS — M47.816 OSTEOARTHRITIS OF LUMBAR SPINE, UNSPECIFIED SPINAL OSTEOARTHRITIS COMPLICATION STATUS: ICD-10-CM

## 2021-11-02 DIAGNOSIS — M54.42 CHRONIC RIGHT-SIDED LOW BACK PAIN WITH LEFT-SIDED SCIATICA: ICD-10-CM

## 2021-11-02 DIAGNOSIS — M41.9 SCOLIOSIS OF LUMBAR SPINE, UNSPECIFIED SCOLIOSIS TYPE: ICD-10-CM

## 2021-11-02 PROCEDURE — 99214 OFFICE O/P EST MOD 30 MIN: CPT | Performed by: ANESTHESIOLOGY

## 2021-11-02 RX ORDER — OXYCODONE AND ACETAMINOPHEN 7.5; 325 MG/1; MG/1
1 TABLET ORAL EVERY 8 HOURS PRN
Qty: 60 TABLET | Refills: 0 | Status: SHIPPED | OUTPATIENT
Start: 2021-12-02 | End: 2022-01-25 | Stop reason: SDUPTHER

## 2021-11-02 RX ORDER — OXYCODONE AND ACETAMINOPHEN 7.5; 325 MG/1; MG/1
1 TABLET ORAL EVERY 8 HOURS PRN
Qty: 60 TABLET | Refills: 0 | Status: SHIPPED | OUTPATIENT
Start: 2021-11-02 | End: 2022-01-11

## 2021-11-02 RX ORDER — POTASSIUM CHLORIDE 20 MEQ/1
1 TABLET, EXTENDED RELEASE ORAL DAILY
COMMUNITY

## 2021-11-02 RX ORDER — TERBINAFINE HYDROCHLORIDE 250 MG/1
250 TABLET ORAL DAILY
COMMUNITY

## 2021-11-05 ENCOUNTER — TELEPHONE (OUTPATIENT)
Dept: FAMILY MEDICINE CLINIC | Facility: CLINIC | Age: 81
End: 2021-11-05

## 2021-11-05 NOTE — TELEPHONE ENCOUNTER
GREYSON WITH [facility] IS CALLING BECAUSE PT CALLED TO MAKE AN APPT, HE STATED THAT THE NEURO HE WAS SUPPOSE TO SEE MOVED.   GREYSON HAS A FEW QUESTIONS FOR DR. BOYD AND ASKED IF HIS MA COULD CALL AND ASK FOR GREYSON, HER EXTENSION IS [phone]

## 2021-11-11 RX ORDER — METHYLPHENIDATE HYDROCHLORIDE 10 MG/1
10 TABLET ORAL DAILY
Qty: 30 TABLET | Refills: 0 | Status: SHIPPED | OUTPATIENT
Start: 2021-11-11 | End: 2021-12-08 | Stop reason: SDUPTHER

## 2021-11-11 NOTE — TELEPHONE ENCOUNTER
Caller: Horacio Tony OSCAR    Relationship: Self    Best call back number: [phone]     Requested Prescriptions:   Requested Prescriptions     Pending Prescriptions Disp Refills   • methylphenidate (Ritalin) 10 MG tablet 30 tablet 0     Sig: Take 1 tablet by mouth Daily. For ADD 3 months        Pharmacy where request should be sent: [facility] [address] AT [facility] [address] & ([facility] [phone] [facility] [phone] FX     Additional details provided by patient: OUT OF MEDICATION    Does the patient have less than a 3 day supply:  [x] Yes  [] No    Lennox Short   11/11/21 12:39 EST

## 2021-11-23 NOTE — ANESTHESIA PROCEDURE NOTES
Airway  Urgency: elective    Date/Time: 9/23/2019 2:56 PM    General Information and Staff    Patient location during procedure: OR  Anesthesiologist: Bassam Causey MD    Indications and Patient Condition  Indications for airway management: airway protection    Preoxygenated: yes  MILS maintained throughout  Mask difficulty assessment: 1 - vent by mask    Final Airway Details  Final airway type: endotracheal airway      Successful airway: ETT  Cuffed: yes   Successful intubation technique: direct laryngoscopy  Endotracheal tube insertion site: oral  Blade: Skyla  Blade size: 3  ETT size (mm): 7.5  Cormack-Lehane Classification: grade I - full view of glottis  Placement verified by: chest auscultation   Measured from: lips  ETT/EBT  to lips (cm): 22  Number of attempts at approach: 1               23-Nov-2021 14:39

## 2021-12-08 RX ORDER — METHYLPHENIDATE HYDROCHLORIDE 10 MG/1
10 TABLET ORAL DAILY
Qty: 30 TABLET | Refills: 0 | Status: SHIPPED | OUTPATIENT
Start: 2021-12-08 | End: 2022-01-18 | Stop reason: SDUPTHER

## 2021-12-08 NOTE — TELEPHONE ENCOUNTER
Caller: Horacio Tony OSCAR    Relationship: Self    Best call back number: [phone] (H)    Requested Prescriptions:   Requested Prescriptions     Pending Prescriptions Disp Refills   • methylphenidate (Ritalin) 10 MG tablet 30 tablet 0     Sig: Take 1 tablet by mouth Daily. For ADD 3 months        Pharmacy where request should be sent: [address] AT [facility] [address] & ([facility] [phone] [facility] [phone] FX     Additional details provided by patient:     Does the patient have less than a 3 day supply:  [x] Yes  [] No    Cherelle Vazquez Rep   12/08/21 11:16 EST

## 2021-12-21 ENCOUNTER — TELEPHONE (OUTPATIENT)
Dept: PAIN MEDICINE | Facility: CLINIC | Age: 81
End: 2021-12-21

## 2021-12-21 DIAGNOSIS — M79.672 PAIN IN BOTH FEET: ICD-10-CM

## 2021-12-21 DIAGNOSIS — G89.29 CHRONIC RIGHT-SIDED LOW BACK PAIN WITH LEFT-SIDED SCIATICA: ICD-10-CM

## 2021-12-21 DIAGNOSIS — Z98.890 HISTORY OF BACK SURGERY: ICD-10-CM

## 2021-12-21 DIAGNOSIS — M54.42 CHRONIC RIGHT-SIDED LOW BACK PAIN WITH LEFT-SIDED SCIATICA: ICD-10-CM

## 2021-12-21 DIAGNOSIS — M79.671 PAIN IN BOTH FEET: ICD-10-CM

## 2021-12-21 DIAGNOSIS — M47.816 OSTEOARTHRITIS OF LUMBAR SPINE, UNSPECIFIED SPINAL OSTEOARTHRITIS COMPLICATION STATUS: ICD-10-CM

## 2021-12-21 DIAGNOSIS — M54.16 LUMBAR RADICULOPATHY: ICD-10-CM

## 2021-12-21 DIAGNOSIS — M41.9 SCOLIOSIS OF LUMBAR SPINE, UNSPECIFIED SCOLIOSIS TYPE: ICD-10-CM

## 2021-12-21 NOTE — TELEPHONE ENCOUNTER
.    Caller: RAMIRO CASEY    Relationship: SELF    Best call back number:     Requested Prescriptions:   Requested Prescriptions     Pending Prescriptions Disp Refills   • oxyCODONE-acetaminophen (PERCOCET) 7.5-325 MG per tablet 60 tablet 0     Sig: Take 1 tablet by mouth Every 8 (Eight) Hours As Needed for Severe Pain . 30 day prescription.        Pharmacy where request should be sent:    [address] AT [facility] [address] & ([place] - [phone] [facility] [phone]   [phone]    Additional details provided by patient:     Does the patient have less than a 3 day supply:  [x] Yes  [] No    Cherelle Vera Rep   12/21/21 16:09 EST

## 2021-12-22 DIAGNOSIS — M79.671 PAIN IN BOTH FEET: ICD-10-CM

## 2021-12-22 DIAGNOSIS — M41.9 SCOLIOSIS OF LUMBAR SPINE, UNSPECIFIED SCOLIOSIS TYPE: ICD-10-CM

## 2021-12-22 DIAGNOSIS — G89.29 CHRONIC RIGHT-SIDED LOW BACK PAIN WITH LEFT-SIDED SCIATICA: ICD-10-CM

## 2021-12-22 DIAGNOSIS — M47.816 OSTEOARTHRITIS OF LUMBAR SPINE, UNSPECIFIED SPINAL OSTEOARTHRITIS COMPLICATION STATUS: ICD-10-CM

## 2021-12-22 DIAGNOSIS — Z98.890 HISTORY OF BACK SURGERY: ICD-10-CM

## 2021-12-22 DIAGNOSIS — M54.42 CHRONIC RIGHT-SIDED LOW BACK PAIN WITH LEFT-SIDED SCIATICA: ICD-10-CM

## 2021-12-22 DIAGNOSIS — M54.16 LUMBAR RADICULOPATHY: ICD-10-CM

## 2021-12-22 DIAGNOSIS — M79.672 PAIN IN BOTH FEET: ICD-10-CM

## 2021-12-22 RX ORDER — OXYCODONE AND ACETAMINOPHEN 7.5; 325 MG/1; MG/1
1 TABLET ORAL EVERY 8 HOURS PRN
Qty: 60 TABLET | Refills: 0 | OUTPATIENT
Start: 2021-12-22

## 2021-12-22 RX ORDER — OXYCODONE AND ACETAMINOPHEN 7.5; 325 MG/1; MG/1
1 TABLET ORAL EVERY 8 HOURS PRN
Qty: 60 TABLET | Refills: 0 | Status: CANCELLED | OUTPATIENT
Start: 2021-12-22

## 2021-12-23 ENCOUNTER — TELEPHONE (OUTPATIENT)
Dept: FAMILY MEDICINE CLINIC | Facility: CLINIC | Age: 81
End: 2021-12-23

## 2022-01-11 ENCOUNTER — OFFICE VISIT (OUTPATIENT)
Dept: GASTROENTEROLOGY | Facility: CLINIC | Age: 82
End: 2022-01-11

## 2022-01-11 VITALS — TEMPERATURE: 97.5 F | BODY MASS INDEX: 25.34 KG/M2 | WEIGHT: 181 LBS | HEIGHT: 71 IN

## 2022-01-11 DIAGNOSIS — D12.6 ADENOMATOUS POLYP OF COLON, UNSPECIFIED PART OF COLON: ICD-10-CM

## 2022-01-11 DIAGNOSIS — K59.00 CONSTIPATION, UNSPECIFIED CONSTIPATION TYPE: ICD-10-CM

## 2022-01-11 DIAGNOSIS — K21.9 GASTROESOPHAGEAL REFLUX DISEASE, UNSPECIFIED WHETHER ESOPHAGITIS PRESENT: Primary | ICD-10-CM

## 2022-01-11 PROCEDURE — 99213 OFFICE O/P EST LOW 20 MIN: CPT | Performed by: INTERNAL MEDICINE

## 2022-01-11 NOTE — PROGRESS NOTES
Chief Complaint   Patient presents with   • Heartburn        Tony Leonard is a  81 y.o. male here for a follow up visit for GERD, constipation    HPI this 81-year-old white male patient of Dr. Erich Aviles returns in follow-up since he was last seen in April 2021.  He has been followed for reflux as well as constipation.  His last upper endoscopy was in 2014 and his last colonoscopy was in 2019.  An adenomatous polyp was removed in 2019 and he would be due for follow-up in 2024 if he decides to proceed.  Upper endoscopy could be performed in that same setting if he so desires.  In the interim he will continue to treat his reflux with the proton pump inhibitor.  He states his bowel pattern is relatively routine with the use of MiraLAX and a stool softener.  I have advised he follow-up in this office annually but call sooner as needed for any GI related complaints.    Past Medical History:   Diagnosis Date   • ADHD (attention deficit hyperactivity disorder)    • Bronchiectasis (HCC)    • Colon polyp    • COPD (chronic obstructive pulmonary disease) ([facility])    • Diverticulosis    • Hard of hearing    • On home oxygen therapy     2 LITERS   • Pneumonia    • Prostate cancer ([facility]) 4/22/2019       Current Outpatient Medications   Medication Sig Dispense Refill   • albuterol (ACCUNEB) 1.25 MG/3ML nebulizer solution Inhale 1 ampule.     • albuterol (PROVENTIL) (2.5 MG/3ML) 0.083% nebulizer solution Take 2.5 mg by nebulization 4 (Four) Times a Day. (Patient taking differently: Take 2.5 mg by nebulization 2 (Two) Times a Day.) 360 mL 3   • Anoro Ellipta 62.5-25 MCG/INH aerosol powder  inhaler USE 1 INHALATION DAILY 180 each 3   • budesonide (PULMICORT) 0.5 MG/2ML nebulizer solution Take 2 mL by nebulization 2 (Two) Times a Day. 1 vial only twice daily 30 each 3   • calcium carbonate (TUMS) 500 MG chewable tablet Chew 1 tablet As Needed for Indigestion or Heartburn.     • FLUoxetine (PROzac) 20 MG capsule TAKE 1 CAPSULE  DAILY 90 capsule 3   • furosemide (LASIX) 40 MG tablet Take 40 mg by mouth.     • gabapentin (NEURONTIN) 100 MG capsule TAKE 1 CAPSULE EVERY NIGHT 270 capsule 0   • ketoconazole (NIZORAL) 2 % shampoo APPLY TOPICALLY TO THE APPROPRIATE AREA AS DIRECTED TWO TIMES A WEEK 120 mL 3   • methylphenidate (Ritalin) 10 MG tablet Take 1 tablet by mouth Daily. For ADD 3 months 30 tablet 0   • Misc. Devices (Rollator Ultra-Light) misc 1 application Daily. Severe DJD 1 each 0   • Multiple Vitamins-Minerals (MULTIVITAMIN ADULT PO) Take 1 tablet by mouth Daily.     • oxyCODONE-acetaminophen (PERCOCET) 7.5-325 MG per tablet Take 1 tablet by mouth Every 8 (Eight) Hours As Needed for Severe Pain . 30 day prescription. 60 tablet 0   • OXYGEN-HELIUM IN Inhale 2.5 L Daily As Needed.     • pantoprazole (PROTONIX) 40 MG EC tablet TAKE 1 TABLET DAILY 90 tablet 3   • polyethylene glycol 17 g packet DISSOLVE ONE PACKET IN 8OZ LIQUID & DRINK BY MOUTH DAILY 28 each 10   • potassium chloride (K-DUR,KLOR-CON) 20 MEQ CR tablet Take 1 tablet by mouth Daily.     • rOPINIRole (REQUIP) 0.5 MG tablet TAKE 1 TO 2 TABLETS EVERY NIGHT 1 HOUR BEFORE BEDTIME 90 tablet 7   • terbinafine (lamiSIL) 250 MG tablet      • TiZANidine (ZANAFLEX) 6 MG capsule TAKE 1 CAPSULE EVERY NIGHT 90 capsule 3   • Turmeric 500 MG capsule Take  by mouth Daily.     • vitamin B-12 (CYANOCOBALAMIN) 2500 MCG sublingual tablet tablet Take 2,500 mcg by mouth Every Evening.     • colestipol (COLESTID) 5 g granules Take 5 g by mouth 2 (Two) Times a Day. Prn after diarrhea 500 g 3   • sodium chloride 7 % nebulizer solution nebulizer solution        No current facility-administered medications for this visit.       PRN Meds:.    Allergies   Allergen Reactions   • Daliresp [Roflumilast] Anaphylaxis   • Latex Rash   • Sulfa Antibiotics Rash       Social History     Socioeconomic History   • Marital status: Single   Tobacco Use   • Smoking status: Never Smoker   • Smokeless tobacco: Never  Used   Substance and Sexual Activity   • Alcohol use: No     Comment: red wine occassional   • Drug use: No   • Sexual activity: Defer       Family History   Problem Relation Age of Onset   • Thyroid disease Mother    • No Known Problems Father    • Malig Hyperthermia Neg Hx        Review of Systems   Constitutional: Negative for activity change, appetite change, fatigue and unexpected weight change.   HENT: Negative for congestion, facial swelling, sore throat, trouble swallowing and voice change.    Eyes: Negative for photophobia and visual disturbance.   Respiratory: Negative for cough and choking.    Cardiovascular: Negative for chest pain.   Gastrointestinal: Positive for constipation. Negative for abdominal distention, abdominal pain, anal bleeding, blood in stool, diarrhea, nausea, rectal pain and vomiting.        GERD   Endocrine: Negative for polyphagia.   Musculoskeletal: Negative for arthralgias, gait problem and joint swelling.   Skin: Negative for color change, pallor and rash.   Allergic/Immunologic: Negative for food allergies.   Neurological: Negative for speech difficulty and headaches.   Hematological: Does not bruise/bleed easily.   Psychiatric/Behavioral: Negative for agitation, confusion and sleep disturbance.       Vitals:    01/11/22 0952   Temp: 97.5 °F (36.4 °C)       Physical Exam  Constitutional:       Appearance: He is well-developed.   HENT:      Head: Normocephalic.   Eyes:      Conjunctiva/sclera: Conjunctivae normal.   Cardiovascular:      Rate and Rhythm: Normal rate and regular rhythm.   Pulmonary:      Breath sounds: Normal breath sounds.   Abdominal:      General: Bowel sounds are normal.      Palpations: Abdomen is soft.   Musculoskeletal:         General: Normal range of motion.      Cervical back: Normal range of motion.      Comments: Uses a walker for ambulation   Skin:     General: Skin is warm and dry.   Neurological:      Mental Status: He is alert and oriented to person,  place, and time.   Psychiatric:         Behavior: Behavior normal.         ASSESSMENT   #1 GERD: Stable on PPI  #2 constipation: Controlled with polyethylene glycol and stool softener  #3 history of adenomatous polyps      PLAN  Annual follow-up  Can discuss possible follow-up examination with EGD and colonoscopy in 2024      ICD-10-CM ICD-9-CM   1. Gastroesophageal reflux disease, unspecified whether esophagitis present  K21.9 530.81   2. Constipation, unspecified constipation type  K59.00 564.00   3. Adenomatous polyp of colon, unspecified part of colon  D12.6 211.3

## 2022-01-18 RX ORDER — METHYLPHENIDATE HYDROCHLORIDE 10 MG/1
10 TABLET ORAL DAILY
Qty: 30 TABLET | Refills: 0 | Status: SHIPPED | OUTPATIENT
Start: 2022-01-18 | End: 2022-02-10 | Stop reason: SDUPTHER

## 2022-01-18 NOTE — TELEPHONE ENCOUNTER
Caller: Tony Leonard OSCAR    Relationship: Self    Best call back number: [phone]    Requested Prescriptions:   Requested Prescriptions     Pending Prescriptions Disp Refills   • methylphenidate (Ritalin) 10 MG tablet 30 tablet 0     Sig: Take 1 tablet by mouth Daily. For ADD 3 months        Pharmacy where request should be sent: [facility] [address] AT [facility] [address] & ([facility] [phone] [facility] [phone] FX     Additional details provided by patient: PATIENT HAS ONE TABLET LEFT    Does the patient have less than a 3 day supply:  [x] Yes  [] No    Cherelle Garcia Rep   01/18/22 15:39 EST

## 2022-01-20 NOTE — PROGRESS NOTES
The patient has a pain history of the following:  Chronic low back pain  Lumbar spondylosis   Lumbar disc disease   Scoliosis   Lumbar stenosis   Lumbar radiculopathy     Previous interventions that the patient has received include:   Epidural steroid injections  Possible other interventions      Pain medications include:  Gabapentin  Percocet 7.5-325mg BID  Requip  Tizanidine     Other conservative modalities which the patient reports using include:  Physical Therapy: yes  Neck or back surgery: yes  Past pain management: yes     Past Significant Surgical History:  L2-4 microlumbar laminectomy 9/30/2020  Lumbar surgery 1985    HPI:     CHIEF COMPLAINT Back Pain  F/U back pain- patient states that his pain has worsened since his last visit.     Subjective   Tony Leonard is a 81 y.o. male  who presents for follow-up.  He has a history of back surgery with Dr. Chan on 9/30/2020.  Since his surgery he's been taking Percocet and Tizanidine for pain.  He is not interested in Medial branch blocks/Radiofrequency ablation or spinal cord stimulator therapy.    He states he's seen an orthopedic surgeon to re-evaluate his hip because he's been having pain starting in the buttock and traveling down his right lower extremity sometimes to the foot.  Sometimes the pain is in the medial leg and sometimes in the lateral leg.      He continues to take his muscle relaxant and pain medication without side effects and he feels it does help him.      He denies dizziness or light headedness that could be associated with low blood pressure today.        PEG Assessment   What number best describes your pain on average in the past week?6  What number best describes how, during the past week, pain has interfered with your enjoyment of life?6  What number best describes how, during the past week, pain has interfered with your general activity?  6    REVIEW OF PERTINENT MEDICAL DATA  No new     The following portions of the patient's  "history were reviewed and updated as appropriate: allergies, current medications, past family history, past medical history, past social history, past surgical history and problem list.    Review of Systems   Constitutional: Positive for activity change (decreased) and fatigue. Negative for chills and fever.   HENT: Negative for congestion.    Eyes: Negative for visual disturbance.   Respiratory: Negative for chest tightness and shortness of breath.    Cardiovascular: Negative for chest pain.   Gastrointestinal: Negative for abdominal pain, constipation and diarrhea.   Genitourinary: Negative for difficulty urinating and dysuria.   Musculoskeletal: Positive for back pain.   Neurological: Positive for weakness and numbness (right leg). Negative for dizziness, light-headedness and headaches.   Psychiatric/Behavioral: Positive for agitation. Negative for self-injury, sleep disturbance and suicidal ideas. The patient is nervous/anxious.      I have reviewed and confirmed the accuracy of the ROS as documented by the MA/LPN/RN Anum Bhatt MD    Vitals:    01/25/22 0953 01/25/22 1003   BP: 92/52 96/52  Comment: manually   Pulse: 55    Temp: 97.5 °F (36.4 °C)    SpO2: 93%    Weight: 81.8 kg (180 lb 6.4 oz)    Height: 180.3 cm (71\")    PainSc:   8    PainLoc: Back          Objective   Physical Exam  Vitals reviewed.   Constitutional:       General: He is not in acute distress.  Pulmonary:      Effort: Pulmonary effort is normal. No respiratory distress.   Musculoskeletal:      Comments: Ambulation: With a walker  Lumbar Exam:  Appearance: Scoliotic curve present and scarring present  Straight leg raise is positive radiculopathy, Right.    Skin:     General: Skin is warm and dry.   Psychiatric:         Mood and Affect: Mood normal.         Thought Content: Thought content normal.         Controlled Substance Monitoring:     Controlled Substance Agreement Signed: Yes  Date Signed: 11/18/2020  Provider: Anum Bhatt, " MD  Last NEY Report Reviewed: 1/25/22  Appropriate for ongoing care?: Yes  Opioid Risk Category: Low      Last drug screen performed on:  7/26/21 and was appropriate    Patient denies a history of substance/chemical abuse.     Assessment/Plan   Diagnoses and all orders for this visit:    1. Lumbar radiculopathy (Primary)  -     CT Lumbar Spine Without Contrast; Future    2. History of back surgery  -     CT Lumbar Spine Without Contrast; Future    3. Scoliosis of lumbar spine, unspecified scoliosis type  -     CT Lumbar Spine Without Contrast; Future          - Updated controlled substance agreement today.   - UDS updated today.    - Refill provided for oxycodone-acetaminophen.  Patient appears stable with current regimen. No adverse effects. Regarding continuation of opioids, there is no evidence of aberrant behavior or any red flags.  The patient continues with appropriate response to opioid therapy. ADL's remain intact by self.    - CT lumbar spine ordered to evaluate after his lumbar surgery.   - Will likely recommend right L4-5 and right L5-S1 Transforaminal epidural steroid injection if results confirm cause for radicular symptoms in this distribution.  Risks discussed including but not limited to bleeding, bruising, infection, damage to surrounding structures, headache, and rare things such as being paralyzed, seizure, stroke, heart attack and death.  The risk of steroid medications include but are not limited to immunosuppression, which can increase the risk of shelbi an infectious disease as well as decrease the immune response to a vaccine.  Explained he will need a  for this procedure and someone with him if he uses public transportation and wants sedation.   - Tony Leonard reports a pain score of 8.  Given his pain assessment as noted, treatment options were discussed and the following options were decided upon as a follow-up plan to address the patient's pain: continuation of current  treatment plan for pain and imaging ordered.    --- Follow-up for CT and right L4-5 and right L5-S1 Transforaminal epidural steroid injection if imaging correlates.  1 month office visit for medication follow-up.            NEY REPORT    As part of the patient's treatment plan, I am prescribing controlled substances. The patient has been made aware of appropriate use of such medications, including potential risk of somnolence, limited ability to drive and/or work safely, and the potential for dependence or overdose. It has also bee made clear that these medications are for use by this patient only, without concomitant use of alcohol or other substances unless prescribed.     Patient has completed prescribing agreement detailing terms of continued prescribing of controlled substances, including monitoring NEY reports, urine drug screening, and pill counts if necessary. The patient is aware that inappropriate use will results in cessation of prescribing such medications.    As the clinician, I personally reviewed the NEY from 1/25/22 .    History and physical exam exhibit continued safe and appropriate use of controlled substances.    While examining this patient, I wore protective equipment including a mask, eye shield and gloves.  I washed my hands before and after this patient encounter.  The patient wore a mask throughout the visit as well.     Anum Bhatt MD  Pain Management

## 2022-01-25 ENCOUNTER — OFFICE VISIT (OUTPATIENT)
Dept: PAIN MEDICINE | Facility: CLINIC | Age: 82
End: 2022-01-25

## 2022-01-25 VITALS
SYSTOLIC BLOOD PRESSURE: 96 MMHG | HEIGHT: 71 IN | HEART RATE: 55 BPM | BODY MASS INDEX: 25.26 KG/M2 | OXYGEN SATURATION: 93 % | TEMPERATURE: 97.5 F | WEIGHT: 180.4 LBS | DIASTOLIC BLOOD PRESSURE: 52 MMHG

## 2022-01-25 DIAGNOSIS — M41.9 SCOLIOSIS OF LUMBAR SPINE, UNSPECIFIED SCOLIOSIS TYPE: ICD-10-CM

## 2022-01-25 DIAGNOSIS — Z98.890 HISTORY OF BACK SURGERY: ICD-10-CM

## 2022-01-25 DIAGNOSIS — G89.29 CHRONIC RIGHT-SIDED LOW BACK PAIN WITH LEFT-SIDED SCIATICA: ICD-10-CM

## 2022-01-25 DIAGNOSIS — M79.672 PAIN IN BOTH FEET: ICD-10-CM

## 2022-01-25 DIAGNOSIS — M79.671 PAIN IN BOTH FEET: ICD-10-CM

## 2022-01-25 DIAGNOSIS — M54.42 CHRONIC RIGHT-SIDED LOW BACK PAIN WITH LEFT-SIDED SCIATICA: ICD-10-CM

## 2022-01-25 DIAGNOSIS — M54.16 LUMBAR RADICULOPATHY: Primary | ICD-10-CM

## 2022-01-25 DIAGNOSIS — M47.816 OSTEOARTHRITIS OF LUMBAR SPINE, UNSPECIFIED SPINAL OSTEOARTHRITIS COMPLICATION STATUS: ICD-10-CM

## 2022-01-25 PROCEDURE — 99214 OFFICE O/P EST MOD 30 MIN: CPT | Performed by: ANESTHESIOLOGY

## 2022-01-25 PROCEDURE — 80305 DRUG TEST PRSMV DIR OPT OBS: CPT | Performed by: ANESTHESIOLOGY

## 2022-01-25 RX ORDER — OXYCODONE AND ACETAMINOPHEN 7.5; 325 MG/1; MG/1
1 TABLET ORAL EVERY 8 HOURS PRN
Qty: 60 TABLET | Refills: 0 | Status: SHIPPED | OUTPATIENT
Start: 2022-01-25 | End: 2022-02-24 | Stop reason: SDUPTHER

## 2022-01-25 NOTE — PATIENT INSTRUCTIONS
What to expect if we're setting up an injection/procedure    - I have placed the order today, we'll start speaking to your insurance for authorization (this can sometimes take a few weeks).   - You should be scheduled for your procedure before you leave the office.  If you were not, please call our office to schedule.   - A COVID test may be required for your procedure.  We will let you know if this needs to be scheduled.  - LIGHT Intravenous (IV) sedation is offered for some procedures: you will NOT be put to sleep.  If you plan to have sedation, do not eat or drink anything on the day of your injection.   - Most procedures require having someone drive you.  Please make sure you arrange a  unless told otherwise.   - If you take a blood thinner and you were not instructed whether to continue or hold it, please contact us with any questions.        Epidural Steroid Injection    An epidural steroid injection is a shot of steroid medicine and numbing medicine that is given into the space between the spinal cord and the bones of the back (epidural space). The shot helps relieve pain caused by an irritated or swollen nerve root.  The amount of pain relief you get from the injection depends on what is causing the nerve to be swollen and irritated, and how long your pain lasts. You are more likely to benefit from this injection if your pain is strong and comes on suddenly rather than if you have had long-term (chronic) pain.  Tell a health care provider about:  · Any allergies you have.  · All medicines you are taking, including vitamins, herbs, eye drops, creams, and over-the-counter medicines.  · Any problems you or family members have had with anesthetic medicines.  · Any blood disorders you have.  · Any surgeries you have had.  · Any medical conditions you have.  · Whether you are pregnant or may be pregnant.  What are the risks?  Generally, this is a safe procedure. However, problems may occur,  including:  · Headache.  · Bleeding.  · Infection.  · Allergic reaction to medicines.  · Nerve damage.  What happens before the procedure?  Staying hydrated  Follow instructions from your health care provider about hydration, which may include:  · Up to 2 hours before the procedure - you may continue to drink clear liquids, such as water, clear fruit juice, black coffee, and plain tea.  Eating and drinking restrictions  Follow instructions from your health care provider about eating and drinking, which may include:  · 8 hours before the procedure - stop eating heavy meals or foods, such as meat, fried foods, or fatty foods.  · 6 hours before the procedure - stop eating light meals or foods, such as toast or cereal.  · 6 hours before the procedure - stop drinking milk or drinks that contain milk.  · 2 hours before the procedure - stop drinking clear liquids.  Medicines  · You may be given medicines to lower anxiety.  · Ask your health care provider about:  ? Changing or stopping your regular medicines. This is especially important if you are taking diabetes medicines or blood thinners.  ? Taking medicines such as aspirin and ibuprofen. These medicines can thin your blood. Do not take these medicines unless your health care provider tells you to take them.  ? Taking over-the-counter medicines, vitamins, herbs, and supplements.  General instructions  · Ask your health care provider what steps will be taken to prevent infection.  · Plan to have a responsible adult take you home from the hospital or clinic.  · If you will be going home right after the procedure, plan to have a responsible adult care for you for the time you are told. This is important.  What happens during the procedure?  · An IV will be inserted into one of your veins.  · You will be given one or more of the following:  ? A medicine to help you relax (sedative).  ? A medicine to numb the area (local anesthetic).  · You will be asked to lie on your  abdomen or sit.  · The injection site will be cleaned.  · A needle will be inserted through your skin into the epidural space. This may cause you some discomfort. An X-ray machine will be used to guide the needle as close as possible to the affected nerve.  · A steroid medicine and a local anesthetic will be injected into the epidural space.  · The needle and IV will be removed.  · A bandage (dressing) will be put over the injection site.  The procedure may vary among health care providers and hospitals.  What can I expect after the procedure?  · Your blood pressure, heart rate, breathing rate, and blood oxygen level will be monitored until you leave the hospital or clinic.  · Your arm or leg may feel weak or numb for a few hours.  · The injection site may feel sore.  Follow these instructions at home:  Injection site care  · You may remove the bandage (dressing) after 24 hours.  · Check your injection site every day for signs of infection. Check for:  ? Redness, swelling, or pain.  ? Fluid or blood.  ? Warmth.  ? Pus or a bad smell.  Managing pain, stiffness, and swelling  · For 24 hours after the procedure:  ? Avoid using heat on the injection site.  ? Do not take baths, swim, or use a hot tub until your health care provider approves. Ask your health care provider if you may take showers. You may only be allowed to take sponge baths.  · If directed, put ice on the injection site. To do this:  ? Put ice in a plastic bag.  ? Place a towel between your skin and the bag.  ? Leave the ice on for 20 minutes, 2-3 times a day.    Activity  · If you were given a sedative during the procedure, it can affect you for several hours. Do not drive or operate machinery until your health care provider says that it is safe.  · Return to your normal activities as told by your health care provider. Ask your health care provider what activities are safe for you.  General instructions  · Take over-the-counter and prescription medicines  only as told by your health care provider.  · Drink enough fluid to keep your urine pale yellow.  · Keep all follow-up visits as told by your health care provider. This is important.  Contact a health care provider if:  · You have any of these signs of infection:  ? Redness, swelling, or pain around your injection site.  ? Fluid or blood coming from your injection site.  ? Warmth coming from your injection site.  ? Pus or a bad smell coming from your injection site.  ? A fever.  · You continue to have pain and soreness around the injection site, even after taking over-the-counter pain medicine.  · You have severe, sudden, or lasting nausea or vomiting.  Get help right away if:  · You have severe pain at the injection site that is not relieved by medicines.  · You develop a severe headache or a stiff neck.  · You become sensitive to light.  · You have any new numbness or weakness in your legs or arms.  · You lose control of your bladder or bowel movements.  · You have trouble breathing.  Summary  · An epidural steroid injection is a shot of steroid medicine and numbing medicine that is given into the epidural space.  · The shot helps relieve pain caused by an irritated or swollen nerve root.  · You are more likely to benefit from this injection if your pain is strong and comes on suddenly rather than if you have had chronic pain.  This information is not intended to replace advice given to you by your health care provider. Make sure you discuss any questions you have with your health care provider.  Document Revised: 04/16/2021 Document Reviewed: 06/29/2020  [employer] Patient Education © 2021 [employer]

## 2022-01-27 DIAGNOSIS — Z98.890 HISTORY OF BACK SURGERY: ICD-10-CM

## 2022-01-27 DIAGNOSIS — M62.838 MUSCLE SPASM: ICD-10-CM

## 2022-01-27 RX ORDER — TIZANIDINE HYDROCHLORIDE 6 MG/1
6 CAPSULE, GELATIN COATED ORAL NIGHTLY
Qty: 30 CAPSULE | Refills: 0 | Status: SHIPPED | OUTPATIENT
Start: 2022-01-27 | End: 2022-02-08

## 2022-01-27 NOTE — TELEPHONE ENCOUNTER
Caller: Tony Leonard    Relationship: Self    Best call back number: [phone]  Requested Prescriptions:   Requested Prescriptions     Pending Prescriptions Disp Refills   • TiZANidine (ZANAFLEX) 6 MG capsule 90 capsule 3     Sig: Take 1 capsule by mouth Every Night.        Pharmacy where request should be sent: [facility] [address] AT [facility] & ([facility] [phone] [facility] [phone] FX     Additional details provided by patient: PATIENT IS ALMOST OUT OF THIS MED    Does the patient have less than a 3 day supply:  [x] Yes  [] No    Nohelia Gibbs, Cherelle Rep   01/27/22 15:30 EST

## 2022-01-28 ENCOUNTER — TELEPHONE (OUTPATIENT)
Dept: PAIN MEDICINE | Facility: CLINIC | Age: 82
End: 2022-01-28

## 2022-01-28 NOTE — TELEPHONE ENCOUNTER
Mr. Leonard called today and states that his CT scan isn't scheduled until 2/23/2022. He is scheduled to have an injection by you on 2/24/2022. He wanted to know if this is a problem. He wants to know if he needs to try to get the CT scan done sooner.

## 2022-01-31 RX ORDER — FLUOXETINE HYDROCHLORIDE 20 MG/1
CAPSULE ORAL
Qty: 90 CAPSULE | Refills: 3 | Status: SHIPPED | OUTPATIENT
Start: 2022-01-31 | End: 2022-12-01 | Stop reason: HOSPADM

## 2022-02-08 DIAGNOSIS — M62.838 MUSCLE SPASM: ICD-10-CM

## 2022-02-08 DIAGNOSIS — Z98.890 HISTORY OF BACK SURGERY: ICD-10-CM

## 2022-02-08 RX ORDER — TIZANIDINE HYDROCHLORIDE 6 MG/1
CAPSULE, GELATIN COATED ORAL
Qty: 90 CAPSULE | Refills: 3 | Status: SHIPPED | OUTPATIENT
Start: 2022-02-08 | End: 2022-12-01 | Stop reason: HOSPADM

## 2022-02-10 ENCOUNTER — OFFICE VISIT (OUTPATIENT)
Dept: FAMILY MEDICINE CLINIC | Facility: CLINIC | Age: 82
End: 2022-02-10

## 2022-02-10 VITALS
HEART RATE: 89 BPM | HEIGHT: 60 IN | WEIGHT: 179 LBS | SYSTOLIC BLOOD PRESSURE: 148 MMHG | OXYGEN SATURATION: 98 % | DIASTOLIC BLOOD PRESSURE: 76 MMHG | TEMPERATURE: 97.3 F | BODY MASS INDEX: 35.14 KG/M2

## 2022-02-10 DIAGNOSIS — M54.40 BILATERAL LOW BACK PAIN WITH SCIATICA, SCIATICA LATERALITY UNSPECIFIED, UNSPECIFIED CHRONICITY: ICD-10-CM

## 2022-02-10 DIAGNOSIS — E78.5 DYSLIPIDEMIA: ICD-10-CM

## 2022-02-10 DIAGNOSIS — F90.0 ATTENTION DEFICIT HYPERACTIVITY DISORDER (ADHD), PREDOMINANTLY INATTENTIVE TYPE: Primary | ICD-10-CM

## 2022-02-10 DIAGNOSIS — E55.9 VITAMIN D DEFICIENCY, UNSPECIFIED: ICD-10-CM

## 2022-02-10 PROCEDURE — 99214 OFFICE O/P EST MOD 30 MIN: CPT | Performed by: INTERNAL MEDICINE

## 2022-02-10 RX ORDER — METHYLPHENIDATE HYDROCHLORIDE 10 MG/1
10 TABLET ORAL DAILY
Qty: 30 TABLET | Refills: 0 | Status: SHIPPED | OUTPATIENT
Start: 2022-02-10 | End: 2022-03-10 | Stop reason: SDUPTHER

## 2022-02-10 RX ORDER — AZITHROMYCIN 250 MG/1
TABLET, FILM COATED ORAL
Qty: 6 TABLET | Refills: 0 | Status: SHIPPED | OUTPATIENT
Start: 2022-02-10 | End: 2022-05-06

## 2022-02-10 RX ORDER — AZITHROMYCIN 250 MG/1
TABLET, FILM COATED ORAL
COMMUNITY
Start: 2021-11-22 | End: 2022-02-10 | Stop reason: SDUPTHER

## 2022-02-10 NOTE — PROGRESS NOTES
Subjective   Tony Leonard is a 81 y.o. male.     Vitals:    02/10/22 0915   BP: 148/76   Pulse: 89   Temp: 97.3 °F (36.3 °C)   SpO2: 98%      Body mass index is 161.06 kg/m².     History of Present Illness   Patient was seen for ADD.  Patient is on Ritalin and needed a refill.  Patient has low back pain and a friend of his sedative 1 foot is shorter than the other he needed lifts.  Patient states he was told he had one leg shorter than the other one in 1 to the lips.  Patient was informed that I was not qualified to give him the list because I had no idea how to prescribe it.  Patient insisted so I given the generic order but informed him needed to be preauthorized or further information about it needs to be given I cannot do that.  He does see a podiatrist and I informed him they are the ones he should be doing the lifts.  Patient does use a Rollator and he does ride stationary bikes.  Patient's lipids are treated with diet and exercise.  Triglycerides 73, HDL 74, .  These labs were drawn 9 months ago and is having a set drawn when he is fasting.  Patient does have vitamin D3 deficiency is supplementing with over-the-counter D3.    Dictated utilizing Dragon dictation. If there are questions or for further clarification, please contact me.  The following portions of the patient's history were reviewed and updated as appropriate: allergies, current medications, past family history, past medical history, past social history, past surgical history and problem list.    Review of Systems   Constitutional: Negative for fatigue and fever.   HENT: Positive for congestion. Negative for trouble swallowing.    Eyes: Negative for discharge and visual disturbance.   Respiratory: Negative for choking and shortness of breath.    Cardiovascular: Negative for chest pain and palpitations.   Gastrointestinal: Negative for abdominal pain and blood in stool.   Endocrine: Negative.    Genitourinary: Negative for genital sores  and hematuria.   Musculoskeletal: Positive for back pain and gait problem. Negative for joint swelling.   Skin: Negative for color change, pallor, rash and wound.   Allergic/Immunologic: Positive for environmental allergies. Negative for immunocompromised state.   Neurological: Negative for facial asymmetry and speech difficulty.   Psychiatric/Behavioral: Negative for hallucinations and suicidal ideas.       Objective   Physical Exam  Vitals and nursing note reviewed.   Constitutional:       Appearance: Normal appearance. He is well-developed.   HENT:      Head: Normocephalic and atraumatic.      Nose: Nose normal.      Mouth/Throat:      Mouth: Mucous membranes are moist.      Pharynx: Oropharynx is clear.   Eyes:      Extraocular Movements: Extraocular movements intact.      Conjunctiva/sclera: Conjunctivae normal.      Pupils: Pupils are equal, round, and reactive to light.   Cardiovascular:      Rate and Rhythm: Normal rate and regular rhythm.      Heart sounds: Normal heart sounds. No murmur heard.  No friction rub. No gallop.    Pulmonary:      Effort: Pulmonary effort is normal. No respiratory distress.      Breath sounds: Normal breath sounds. No stridor. No wheezing, rhonchi or rales.   Chest:      Chest wall: No tenderness.   Abdominal:      General: Bowel sounds are normal.      Palpations: Abdomen is soft.   Musculoskeletal:         General: Normal range of motion.      Cervical back: Normal range of motion and neck supple.   Skin:     General: Skin is warm and dry.   Neurological:      General: No focal deficit present.      Mental Status: He is alert and oriented to person, place, and time. Mental status is at baseline.      Motor: Weakness present.      Coordination: Coordination abnormal.      Gait: Gait abnormal.   Psychiatric:         Mood and Affect: Mood normal.         Behavior: Behavior normal.         Thought Content: Thought content normal.         Judgment: Judgment normal.          Assessment/Plan #1 refer to podiatry for shoe lifts #2 labs  Problems Addressed this Visit        Cardiac and Vasculature    Dyslipidemia    Relevant Orders    CBC (No Diff)    Comprehensive Metabolic Panel    Lipid Panel    Vitamin D 25 Hydroxy       Mental Health    ADD (attention deficit disorder) - Primary    Relevant Medications    methylphenidate (Ritalin) 10 MG tablet    Other Relevant Orders    CBC (No Diff)    Comprehensive Metabolic Panel    Lipid Panel    Vitamin D 25 Hydroxy      Other Visit Diagnoses     Vitamin D deficiency, unspecified        Relevant Orders    CBC (No Diff)    Comprehensive Metabolic Panel    Lipid Panel    Vitamin D 25 Hydroxy    Bilateral low back pain with sciatica, sciatica laterality unspecified, unspecified chronicity        Relevant Orders    Miscellaneous DME      Diagnoses     Diagnosis Codes Comments    Attention deficit hyperactivity disorder (ADHD), predominantly inattentive type    -  Primary ICD-10-CM: F90.0  ICD-9-CM: 314.00     Vitamin D deficiency, unspecified     ICD-10-CM: E55.9  ICD-9-CM: 268.9     Dyslipidemia     ICD-10-CM: E78.5  ICD-9-CM: 272.4     Bilateral low back pain with sciatica, sciatica laterality unspecified, unspecified chronicity     ICD-10-CM: M54.40  ICD-9-CM: 724.3

## 2022-02-14 ENCOUNTER — TELEPHONE (OUTPATIENT)
Dept: FAMILY MEDICINE CLINIC | Facility: CLINIC | Age: 82
End: 2022-02-14

## 2022-02-14 NOTE — TELEPHONE ENCOUNTER
Caller: Tony Leonard    Relationship: Self    Best call back number: [phone]     What orders are you requesting (i.e. lab or imaging): LABS    In what timeframe would the patient need to come in: ASAP    Where will you receive your lab/imaging services: PATIENT SAID HE GOES TO A LAB ON [facility] AT THE [facility] - HE DIDN'T KNOW THE NAME.   PLEASE ADVISE PATIENT.

## 2022-02-15 ENCOUNTER — LAB (OUTPATIENT)
Dept: LAB | Facility: HOSPITAL | Age: 82
End: 2022-02-15

## 2022-02-15 DIAGNOSIS — E55.9 VITAMIN D DEFICIENCY, UNSPECIFIED: ICD-10-CM

## 2022-02-15 DIAGNOSIS — F90.0 ATTENTION DEFICIT HYPERACTIVITY DISORDER (ADHD), PREDOMINANTLY INATTENTIVE TYPE: ICD-10-CM

## 2022-02-15 DIAGNOSIS — E78.5 DYSLIPIDEMIA: ICD-10-CM

## 2022-02-15 LAB
25(OH)D3 SERPL-MCNC: 38.5 NG/ML (ref 30–100)
ALBUMIN SERPL-MCNC: 4.2 G/DL (ref 3.5–5.2)
ALBUMIN/GLOB SERPL: 1.5 G/DL
ALP SERPL-CCNC: 138 U/L (ref 39–117)
ALT SERPL W P-5'-P-CCNC: 17 U/L (ref 1–41)
ANION GAP SERPL CALCULATED.3IONS-SCNC: 11 MMOL/L (ref 5–15)
AST SERPL-CCNC: 19 U/L (ref 1–40)
BILIRUB SERPL-MCNC: 0.6 MG/DL (ref 0–1.2)
BUN SERPL-MCNC: 21 MG/DL (ref 8–23)
BUN/CREAT SERPL: 22.8 (ref 7–25)
CALCIUM SPEC-SCNC: 9.1 MG/DL (ref 8.6–10.5)
CHLORIDE SERPL-SCNC: 104 MMOL/L (ref 98–107)
CHOLEST SERPL-MCNC: 183 MG/DL (ref 0–200)
CO2 SERPL-SCNC: 25 MMOL/L (ref 22–29)
CREAT SERPL-MCNC: 0.92 MG/DL (ref 0.76–1.27)
DEPRECATED RDW RBC AUTO: 43.3 FL (ref 37–54)
ERYTHROCYTE [DISTWIDTH] IN BLOOD BY AUTOMATED COUNT: 11.8 % (ref 12.3–15.4)
GFR SERPL CREATININE-BSD FRML MDRD: 79 ML/MIN/1.73
GLOBULIN UR ELPH-MCNC: 2.8 GM/DL
GLUCOSE SERPL-MCNC: 93 MG/DL (ref 65–99)
HCT VFR BLD AUTO: 40.1 % (ref 37.5–51)
HDLC SERPL-MCNC: 58 MG/DL (ref 40–60)
HGB BLD-MCNC: 13.1 G/DL (ref 13–17.7)
LDLC SERPL CALC-MCNC: 112 MG/DL (ref 0–100)
LDLC/HDLC SERPL: 1.92 {RATIO}
MCH RBC QN AUTO: 32.2 PG (ref 26.6–33)
MCHC RBC AUTO-ENTMCNC: 32.7 G/DL (ref 31.5–35.7)
MCV RBC AUTO: 98.5 FL (ref 79–97)
PLATELET # BLD AUTO: 246 10*3/MM3 (ref 140–450)
PMV BLD AUTO: 9.3 FL (ref 6–12)
POTASSIUM SERPL-SCNC: 4.1 MMOL/L (ref 3.5–5.2)
PROT SERPL-MCNC: 7 G/DL (ref 6–8.5)
RBC # BLD AUTO: 4.07 10*6/MM3 (ref 4.14–5.8)
SODIUM SERPL-SCNC: 140 MMOL/L (ref 136–145)
TRIGL SERPL-MCNC: 67 MG/DL (ref 0–150)
VLDLC SERPL-MCNC: 13 MG/DL (ref 5–40)
WBC NRBC COR # BLD: 6.05 10*3/MM3 (ref 3.4–10.8)

## 2022-02-15 PROCEDURE — 82306 VITAMIN D 25 HYDROXY: CPT | Performed by: INTERNAL MEDICINE

## 2022-02-15 PROCEDURE — 80061 LIPID PANEL: CPT | Performed by: INTERNAL MEDICINE

## 2022-02-15 PROCEDURE — 36415 COLL VENOUS BLD VENIPUNCTURE: CPT | Performed by: INTERNAL MEDICINE

## 2022-02-15 PROCEDURE — 85027 COMPLETE CBC AUTOMATED: CPT

## 2022-02-15 PROCEDURE — 80053 COMPREHEN METABOLIC PANEL: CPT | Performed by: INTERNAL MEDICINE

## 2022-02-16 ENCOUNTER — TELEPHONE (OUTPATIENT)
Dept: FAMILY MEDICINE CLINIC | Facility: CLINIC | Age: 82
End: 2022-02-16

## 2022-02-16 NOTE — TELEPHONE ENCOUNTER
Caller: Tony Leonard    Relationship: Self    Best call back number: [phone]    Caller requesting test results: SELF    What test was performed: LABS FOR CONTINUED PRESCRIPTION    When was the test performed: 02/15/2022    Where was the test performed: IN-OFFICE

## 2022-02-22 ENCOUNTER — TELEPHONE (OUTPATIENT)
Dept: PAIN MEDICINE | Facility: CLINIC | Age: 82
End: 2022-02-22

## 2022-02-22 NOTE — PROGRESS NOTES
The patient has a pain history of the following:  Chronic low back pain  Lumbar spondylosis   Lumbar disc disease   Scoliosis   Lumbar stenosis   Lumbar radiculopathy  T12 & L1 compression deformity      Previous interventions that the patient has received include:   Epidural steroid injections  Possible other interventions      Pain medications include:  Gabapentin 100mg qhs   Percocet 7.5-325mg BID  Requip  Tizanidine     Other conservative modalities which the patient reports using include:  Physical Therapy: yes  Neck or back surgery: yes  Past pain management: yes     Past Significant Surgical History:  L2-4 microlumbar laminectomy 9/30/2020  Lumbar surgery 1985    HPI:     CHIEF COMPLAINT Back Pain  F/U back pain- patient states that his pain has worsened since his last visit.     Subjective   Tony Leonard is a 81 y.o. male  who presents for follow-up.  He has a history of back surgery with Dr. Chan on 9/30/2020.  Since his surgery he's been taking Percocet and Tizanidine for pain.  At his last visit he described right L4-5 and L5-S1 radicular symptoms.  I ordered a CT to further evaluate and recommended Transforaminal epidural steroid injections at the levels above if imaging correlates.  CT was completed 2/23/22 and shows chronic compression deformities of T12 and L1, and a new since last imaging  (July 2021) right sacral alar fracture.     His pain continues in his low back/sacral region, radiating through his buttock and into the right lower extremity.  Sometimes the pain is also on the left side.  He continues to have difficulty with bowel and bladder control, but states he still feels the urge to go - but must get to the restroom immediately.     He continues to take percocet 7.5-325mg up to twice per day.  He denies side effects of the medication.  He asks today if I can prescribe gabapentin.  He states the gabapentin (previous prescribed by Dr. Aviles) helps with his nerve pain, especially at  night.  He denies dizziness or other side effects of it.         PEG Assessment   What number best describes your pain on average in the past week?7  What number best describes how, during the past week, pain has interfered with your enjoyment of life?5  What number best describes how, during the past week, pain has interfered with your general activity?  5    REVIEW OF PERTINENT MEDICAL DATA  2/23/22 CT SCAN OF THE LUMBAR SPINE WITHOUT CONTRAST     CLINICAL HISTORY: History of previous lumbar spine surgery with right  lower extremity radiculopathy.     TECHNIQUE: CT scan of the lumbar spine was obtained with 3 mm axial  images, in addition to sagittal and coronal reconstructed images.     FINDINGS:     There is moderate levoconvex scoliotic curvature of the lumbar spine  with its apex centered at L1-L2.     There is a severe compression deformity at the L1 level with  approximately 80% loss of vertebral body height within the maximally  compressed portion of the anterior aspect of the vertebral body,  although there is relative preservation of the posterior aspect of the  vertebral body. A similar degree of vertebral body height loss was seen  on the prior plain film series dated 11/05/2020. Relatively mild degree  of chronic compression is seen at the T12 level where there is  approximately 10-20% loss of vertebral body height within the maximally  compressed anterior portion of the vertebral body. Again, a similar  degree of vertebral body height loss is seen on the prior exam.     At T11-T12, there is no significant canal or foraminal stenosis.     At T12-L1, there is a mild-to-moderate degree of central canal stenosis  secondary to retropulsion of the posterior superior cortex of T12 and  mild right foraminal narrowing is seen as a result of a disc osteophyte  complex and the retropulsion.     At L1-L2, there is a disc osteophyte complex that results in a  mild-to-moderate degree of central canal stenosis.  There is a moderate  degree of right foraminal narrowing secondary to a combination of a disc  osteophyte complex, loss of foraminal height, and facet hypertrophic  change.     At L2-L3, there is a moderate degree of right foraminal stenosis  secondary to bulging disc material. There is a mild degree of right  foraminal narrowing secondary to disc bulge. Mild central canal stenosis  is seen secondary to the bulging disc material. There has been a  previous left laminectomy procedure at the L2-L3 level.     At L3-L4, again there has been a prior left laminectomy procedure. There  is bulging disc material and facet hypertrophic change that results in a  mild degree of canal stenosis. There is moderate bilateral foraminal  narrowing secondary to bulging disc material and facet hypertrophic  change. There has been a prior left laminectomy procedure at L3-L4.     At L4-L5, severe facet hypertrophic changes are identified. There is  marked loss of disc space height with vacuum disc phenomena.  Degenerative endplate sclerotic changes are noted.     There is a mild-to-moderate degree of right and a moderate-to-severe  degree of left foraminal narrowing secondary to loss of foraminal  height, the anterior spondylolisthesis of L4 on L5, and unroofed disc  material. There is anterior spondylolisthesis of L4 on L5 by  approximately 4-5 mm. Mild-to-moderate central canal stenosis is seen  secondary to disc bulging and facet hypertrophic change.     At L5-S1, there are degenerative disc changes with adjacent degenerative  endplate sclerotic changes. Vacuum disc phenomena are identified.  Bulging disc material and facet hypertrophic changes moderately narrows  both lateral recesses. Bulging disc material mildly narrows the neural  foramina bilaterally. There have been prior bilateral laminectomy  procedures at L5-S1.     There is a right sacral alar fracture. This right sacral alar fracture  is age-indeterminate but is new when  compared to prior CT scan of the  abdomen and pelvis dated 07/29/2021.     IMPRESSION:     There is a right sacral alar fracture that is age-indeterminate but is  new when compared to prior CT scan of the abdomen and pelvis dated  07/29/2021. The temporal nature of this right sacral alar fracture could  be further characterized with an MRI of the lumbar spine, as clinically  indicated.     There are chronic appearing compression deformities at L1 and to lesser  extent at T12 with up to approximately 80% loss of vertebral body height  at the L1 level within the maximally compressed portion of the vertebra.  Only 10-20% loss of vertebral body height is seen at T12. A similar  degree of vertebral body height loss was appreciated on this prior CT  scan of the abdomen and pelvis dated 07/29/2021.     There is moderate levoconvex scoliotic curvature of the lumbar spine  with its apex centered at L1-L2.     Multilevel degenerative phenomena resulting in canal and foraminal  stenotic changes, as well as postsurgical phenomena within the lumbar  spine are as discussed in detail above.     Radiation dose reduction techniques were utilized, including automated  exposure control and exposure modulation based on body size.    The following portions of the patient's history were reviewed and updated as appropriate: allergies, current medications, past family history, past medical history, past social history, past surgical history and problem list.    Review of Systems   Constitutional: Positive for activity change (decreased) and fatigue. Negative for chills and fever.   HENT: Negative for congestion.    Eyes: Negative for visual disturbance.   Respiratory: Negative for chest tightness and shortness of breath.    Cardiovascular: Negative for chest pain.   Gastrointestinal: Positive for abdominal pain. Negative for constipation and diarrhea.   Genitourinary: Negative for difficulty urinating and dysuria.   Musculoskeletal:  "Positive for back pain.   Neurological: Positive for weakness and numbness (fingers). Negative for dizziness, light-headedness and headaches.   Psychiatric/Behavioral: Positive for agitation and sleep disturbance. Negative for self-injury and suicidal ideas. The patient is nervous/anxious.      I have reviewed and confirmed the accuracy of the ROS as documented by the MA/LPN/RN Anum Bhatt MD    Vitals:    02/24/22 0822   BP: 136/71   Pulse: 79   Temp: 96.7 °F (35.9 °C)   SpO2: 95%   Weight: 79.5 kg (175 lb 3.2 oz)   Height: 180.3 cm (71\")   PainSc:   8   PainLoc: Back         Objective   Physical Exam  Vitals reviewed.   Constitutional:       General: He is not in acute distress.  Pulmonary:      Effort: Pulmonary effort is normal. No respiratory distress.   Musculoskeletal:      Comments: Tenderness to palpation of the right sacrum region.  Severe forward stooped gait with much difficulty in standing from sitting.   Skin:     General: Skin is warm and dry.   Neurological:      Mental Status: He is alert.   Psychiatric:         Mood and Affect: Mood normal.         Thought Content: Thought content normal.         Controlled Substance Monitoring:     Controlled Substance Agreement Signed: Yes  Date Signed: 1/25/22  Provider: Anum Bhatt MD  Last [facility] Report Reviewed: 2/24/22  Appropriate for ongoing care?: Yes  Opioid Risk Category: Low    Last drug screen performed on:  1/25/22 and was appropriate    Patient denies a history of substance/chemical abuse.     Assessment/Plan   Diagnoses and all orders for this visit:    1. Closed fracture of sacrum, unspecified portion of sacrum, initial encounter ([facility]) (Primary)  -     Ambulatory Referral to Orthopedic Surgery    2. Bilateral low back pain with sciatica, sciatica laterality unspecified, unspecified chronicity    3. Lumbar radiculopathy    4. History of back surgery    5. Scoliosis of lumbar spine, unspecified scoliosis type    6. Osteoarthritis of lumbar " spine, unspecified spinal osteoarthritis complication status    7. Pain in both feet    8. Chronic right-sided low back pain with left-sided sciatica          - New Sacral alar fracture on the right discussed with the patient.  Advised to follow-up with Dr. Gordon for recommendations on treatment.  If there are no new recommendations and if Dr. Gordon is not opposed, we will plan to move forward with an epidural injection.    - Addendum: MRI pelvis with and without contrast ordered per radiologist recommendations.  - Agreed to take over gabapentin prescription.  Refill sent.   - Refill sent for Percocet without changes.  Patient appears stable with current regimen. No adverse effects. Regarding continuation of opioids, there is no evidence of aberrant behavior or any red flags.  The patient continues with appropriate response to opioid therapy. ADL's remain intact by self.    - Tony L Horacio reports a pain score of 8.  Given his pain assessment as noted, treatment options were discussed and the following options were decided upon as a follow-up plan to address the patient's pain: prescription for non-opiod analgesics and prescription for opiod analgesics.      --- Follow-up 1 month office visit            NEY REPORT    As part of the patient's treatment plan, I am prescribing controlled substances. The patient has been made aware of appropriate use of such medications, including potential risk of somnolence, limited ability to drive and/or work safely, and the potential for dependence or overdose. It has also bee made clear that these medications are for use by this patient only, without concomitant use of alcohol or other substances unless prescribed.     Patient has completed prescribing agreement detailing terms of continued prescribing of controlled substances, including monitoring NEY reports, urine drug screening, and pill counts if necessary. The patient is aware that inappropriate use will results  in cessation of prescribing such medications.    As the clinician, I personally reviewed the NEY from 2/24/22 .    History and physical exam exhibit continued safe and appropriate use of controlled substances.    While examining this patient, I wore protective equipment including a mask, eye shield and gloves.  I washed my hands before and after this patient encounter.  The patient wore a mask throughout the visit as well.     Anum Bhatt MD  Pain Management

## 2022-02-23 ENCOUNTER — HOSPITAL ENCOUNTER (OUTPATIENT)
Dept: CT IMAGING | Facility: HOSPITAL | Age: 82
Discharge: HOME | End: 2022-02-23
Admitting: ANESTHESIOLOGY

## 2022-02-23 DIAGNOSIS — Z98.890 HISTORY OF BACK SURGERY: ICD-10-CM

## 2022-02-23 DIAGNOSIS — M41.9 SCOLIOSIS OF LUMBAR SPINE, UNSPECIFIED SCOLIOSIS TYPE: ICD-10-CM

## 2022-02-23 DIAGNOSIS — M54.16 LUMBAR RADICULOPATHY: ICD-10-CM

## 2022-02-23 PROCEDURE — 72131 CT LUMBAR SPINE W/O DYE: CPT

## 2022-02-24 ENCOUNTER — OFFICE VISIT (OUTPATIENT)
Dept: PAIN MEDICINE | Facility: CLINIC | Age: 82
End: 2022-02-24

## 2022-02-24 ENCOUNTER — TELEPHONE (OUTPATIENT)
Dept: FAMILY MEDICINE CLINIC | Facility: CLINIC | Age: 82
End: 2022-02-24

## 2022-02-24 VITALS
OXYGEN SATURATION: 95 % | SYSTOLIC BLOOD PRESSURE: 136 MMHG | DIASTOLIC BLOOD PRESSURE: 71 MMHG | HEIGHT: 71 IN | WEIGHT: 175.2 LBS | BODY MASS INDEX: 24.53 KG/M2 | TEMPERATURE: 96.7 F | HEART RATE: 79 BPM

## 2022-02-24 DIAGNOSIS — M79.672 PAIN IN BOTH FEET: ICD-10-CM

## 2022-02-24 DIAGNOSIS — S32.10XA CLOSED FRACTURE OF SACRUM, UNSPECIFIED PORTION OF SACRUM, INITIAL ENCOUNTER: Primary | ICD-10-CM

## 2022-02-24 DIAGNOSIS — M54.42 CHRONIC RIGHT-SIDED LOW BACK PAIN WITH LEFT-SIDED SCIATICA: ICD-10-CM

## 2022-02-24 DIAGNOSIS — M41.9 SCOLIOSIS OF LUMBAR SPINE, UNSPECIFIED SCOLIOSIS TYPE: ICD-10-CM

## 2022-02-24 DIAGNOSIS — G89.29 CHRONIC RIGHT-SIDED LOW BACK PAIN WITH LEFT-SIDED SCIATICA: ICD-10-CM

## 2022-02-24 DIAGNOSIS — M79.671 PAIN IN BOTH FEET: ICD-10-CM

## 2022-02-24 DIAGNOSIS — M47.816 OSTEOARTHRITIS OF LUMBAR SPINE, UNSPECIFIED SPINAL OSTEOARTHRITIS COMPLICATION STATUS: ICD-10-CM

## 2022-02-24 DIAGNOSIS — M54.16 LUMBAR RADICULOPATHY: ICD-10-CM

## 2022-02-24 DIAGNOSIS — Z98.890 HISTORY OF BACK SURGERY: ICD-10-CM

## 2022-02-24 DIAGNOSIS — M54.40 BILATERAL LOW BACK PAIN WITH SCIATICA, SCIATICA LATERALITY UNSPECIFIED, UNSPECIFIED CHRONICITY: ICD-10-CM

## 2022-02-24 PROCEDURE — 99214 OFFICE O/P EST MOD 30 MIN: CPT | Performed by: ANESTHESIOLOGY

## 2022-02-24 RX ORDER — GABAPENTIN 100 MG/1
100 CAPSULE ORAL NIGHTLY
Qty: 90 CAPSULE | Refills: 1 | Status: SHIPPED | OUTPATIENT
Start: 2022-02-24 | End: 2022-06-10 | Stop reason: DRUGHIGH

## 2022-02-24 RX ORDER — OXYCODONE AND ACETAMINOPHEN 7.5; 325 MG/1; MG/1
1 TABLET ORAL EVERY 8 HOURS PRN
Qty: 60 TABLET | Refills: 0 | Status: SHIPPED | OUTPATIENT
Start: 2022-02-24 | End: 2022-08-06 | Stop reason: HOSPADM

## 2022-02-24 NOTE — PROGRESS NOTES
Can you let him know that the radiologist recommends I order an MRI of his pelvis to look at this fracture more closely?  He should take his pain medication prior to the MRI to help with the pain/positioning for the scan.

## 2022-02-24 NOTE — TELEPHONE ENCOUNTER
Lm for pt to return call notes from pain management and states they recommended he follow-up with Dr. Gordon before they proceed with injection, consult for Ortho was also placed. He is also on Oxycodone andGabapentin.

## 2022-02-24 NOTE — TELEPHONE ENCOUNTER
PATIENT WENT TO PAIN DOCTOR WHO TOLD HIM HE HAD A BROKEN SACRUM. SHE DID NOT TELL HIM HOW TO TREAT. HE IS WANTING TO HOW TO TREAT IT. HE SAYS HIS PAIN IS ABOUT A SIX RIGHT NOW.    PLEASE ADVISE  [phone]

## 2022-03-01 ENCOUNTER — TELEPHONE (OUTPATIENT)
Dept: NEUROLOGY | Facility: CLINIC | Age: 82
End: 2022-03-01

## 2022-03-01 NOTE — TELEPHONE ENCOUNTER
PT CALLED TO R/S HIS NO SHOWED APPOINTMENT TODAY WITH , WHEN GOING TO R/S NO TEMPLATE POPS UP CALLED CLINIC AND WAS ADVISED BY GEREMIAS THAT SHE WILL SEND A A MESSAGE TO LEBRON SANDERSON AND RYLIE TO SEE HOW THIS SLEEP APPOINTMENT NEEDS TO BE HANDLED.     I WAS ADVISED TO INFORM PT THAT SOMEONE WILL CALL HIM BACK TO R/S

## 2022-03-04 ENCOUNTER — APPOINTMENT (OUTPATIENT)
Dept: MRI IMAGING | Facility: HOSPITAL | Age: 82
End: 2022-03-04

## 2022-03-07 ENCOUNTER — HOSPITAL ENCOUNTER (OUTPATIENT)
Dept: MRI IMAGING | Facility: HOSPITAL | Age: 82
Discharge: HOME | End: 2022-03-07
Admitting: ANESTHESIOLOGY

## 2022-03-07 DIAGNOSIS — S32.10XA CLOSED FRACTURE OF SACRUM, UNSPECIFIED PORTION OF SACRUM, INITIAL ENCOUNTER: ICD-10-CM

## 2022-03-07 PROCEDURE — A9577 INJ MULTIHANCE: HCPCS | Performed by: ANESTHESIOLOGY

## 2022-03-07 PROCEDURE — 72197 MRI PELVIS W/O & W/DYE: CPT

## 2022-03-07 PROCEDURE — 0 GADOBENATE DIMEGLUMINE 529 MG/ML SOLUTION: Performed by: ANESTHESIOLOGY

## 2022-03-07 RX ADMIN — GADOBENATE DIMEGLUMINE 16 ML: 529 INJECTION, SOLUTION INTRAVENOUS at 10:48

## 2022-03-07 NOTE — PROGRESS NOTES
Can you please let him know that his MRI shows an H-type fracture in his sacrum.  I advise him to keep his appointment with Dr. Gordon to see if he needs any treatment for these fractures.

## 2022-03-08 ENCOUNTER — OFFICE VISIT (OUTPATIENT)
Dept: ORTHOPEDIC SURGERY | Facility: CLINIC | Age: 82
End: 2022-03-08

## 2022-03-08 VITALS — HEIGHT: 71 IN | TEMPERATURE: 97.3 F | WEIGHT: 178 LBS | BODY MASS INDEX: 24.92 KG/M2

## 2022-03-08 DIAGNOSIS — M84.48XA SACRAL INSUFFICIENCY FRACTURE, INITIAL ENCOUNTER: ICD-10-CM

## 2022-03-08 DIAGNOSIS — R10.2 PELVIC PAIN: Primary | ICD-10-CM

## 2022-03-08 PROCEDURE — 72170 X-RAY EXAM OF PELVIS: CPT | Performed by: ORTHOPAEDIC SURGERY

## 2022-03-08 PROCEDURE — 99213 OFFICE O/P EST LOW 20 MIN: CPT | Performed by: ORTHOPAEDIC SURGERY

## 2022-03-08 NOTE — PROGRESS NOTES
Pain is worse recently.  He slipped on the ice in February and fell.  He is seen today at request of Dr. Bhatt who diagnosed sacral fractures.  He is not particularly tender about the sacrum and he does have a long history of chronic back pain.  Good strength in the legs and his gait appears slow but steady as always.  MRI does however show bilateral alar fractures which are nondisplaced.  AP pelvis obtained today shows no change in position of the sacrum.  I explained these will take 3 to 4 months to heal and may be worse because they are bilateral but I think it is reasonably safe to continue to use the rolling walker or even the exercise bike if it is tolerated she is going to hurt a while.  I am going to see him back in 6 or 8 weeks.

## 2022-03-10 RX ORDER — METHYLPHENIDATE HYDROCHLORIDE 10 MG/1
10 TABLET ORAL DAILY
Qty: 30 TABLET | Refills: 0 | Status: SHIPPED | OUTPATIENT
Start: 2022-03-10 | End: 2022-05-02 | Stop reason: SDUPTHER

## 2022-03-10 NOTE — TELEPHONE ENCOUNTER
Caller: Horacio Tony L    Relationship: Self    Best call back number:[phone]    Requested Prescriptions:   Requested Prescriptions     Pending Prescriptions Disp Refills   • methylphenidate (Ritalin) 10 MG tablet 30 tablet 0     Sig: Take 1 tablet by mouth Daily. For ADD 3 months        Pharmacy where request should be sent: [facility] [address] AT [facility] [address] & ([facility] [phone] [facility] [phone] [facility]     Does the patient have less than a 3 day supply:  [x] Yes  [] No    Cherelle Lemus Rep   03/10/22 09:47 EST

## 2022-04-26 ENCOUNTER — HOSPITAL ENCOUNTER (EMERGENCY)
Facility: HOSPITAL | Age: 82
Discharge: HOME | End: 2022-04-26
Attending: EMERGENCY MEDICINE | Admitting: EMERGENCY MEDICINE

## 2022-04-26 ENCOUNTER — APPOINTMENT (OUTPATIENT)
Dept: GENERAL RADIOLOGY | Facility: HOSPITAL | Age: 82
End: 2022-04-26

## 2022-04-26 VITALS
SYSTOLIC BLOOD PRESSURE: 135 MMHG | WEIGHT: 177.91 LBS | TEMPERATURE: 97.7 F | HEART RATE: 57 BPM | DIASTOLIC BLOOD PRESSURE: 87 MMHG | OXYGEN SATURATION: 96 % | RESPIRATION RATE: 16 BRPM | BODY MASS INDEX: 24.91 KG/M2 | HEIGHT: 71 IN

## 2022-04-26 DIAGNOSIS — R53.83 LETHARGY: ICD-10-CM

## 2022-04-26 DIAGNOSIS — E83.51 HYPOCALCEMIA: ICD-10-CM

## 2022-04-26 DIAGNOSIS — E86.0 DEHYDRATION: Primary | ICD-10-CM

## 2022-04-26 LAB
ALBUMIN SERPL-MCNC: 3.7 G/DL (ref 3.5–5.2)
ALBUMIN/GLOB SERPL: 1.5 G/DL
ALP SERPL-CCNC: 94 U/L (ref 39–117)
ALT SERPL W P-5'-P-CCNC: 10 U/L (ref 1–41)
ANION GAP SERPL CALCULATED.3IONS-SCNC: 10.1 MMOL/L (ref 5–15)
ARTERIAL PATENCY WRIST A: POSITIVE
AST SERPL-CCNC: 20 U/L (ref 1–40)
ATMOSPHERIC PRESS: 761.1 MMHG
BASE EXCESS BLDA CALC-SCNC: -1.5 MMOL/L (ref 0–2)
BASOPHILS # BLD AUTO: 0.08 10*3/MM3 (ref 0–0.2)
BASOPHILS NFR BLD AUTO: 1.3 % (ref 0–1.5)
BDY SITE: ABNORMAL
BILIRUB SERPL-MCNC: 0.3 MG/DL (ref 0–1.2)
BUN SERPL-MCNC: 26 MG/DL (ref 8–23)
BUN/CREAT SERPL: 26 (ref 7–25)
CALCIUM SPEC-SCNC: 8.5 MG/DL (ref 8.6–10.5)
CHLORIDE SERPL-SCNC: 104 MMOL/L (ref 98–107)
CO2 SERPL-SCNC: 20.9 MMOL/L (ref 22–29)
CREAT SERPL-MCNC: 1 MG/DL (ref 0.76–1.27)
D-LACTATE SERPL-SCNC: 0.9 MMOL/L (ref 0.5–2)
DEPRECATED RDW RBC AUTO: 44.6 FL (ref 37–54)
EGFRCR SERPLBLD CKD-EPI 2021: 74.7 ML/MIN/1.73
EOSINOPHIL # BLD AUTO: 0.63 10*3/MM3 (ref 0–0.4)
EOSINOPHIL NFR BLD AUTO: 10.5 % (ref 0.3–6.2)
ERYTHROCYTE [DISTWIDTH] IN BLOOD BY AUTOMATED COUNT: 12.7 % (ref 12.3–15.4)
GLOBULIN UR ELPH-MCNC: 2.4 GM/DL
GLUCOSE SERPL-MCNC: 99 MG/DL (ref 65–99)
HCO3 BLDA-SCNC: 23.4 MMOL/L (ref 22–28)
HCT VFR BLD AUTO: 33.2 % (ref 37.5–51)
HGB BLD-MCNC: 11.2 G/DL (ref 13–17.7)
HOLD SPECIMEN: NORMAL
IMM GRANULOCYTES # BLD AUTO: 0 10*3/MM3 (ref 0–0.05)
IMM GRANULOCYTES NFR BLD AUTO: 0 % (ref 0–0.5)
INR PPP: 1.04 (ref 0.9–1.1)
LYMPHOCYTES # BLD AUTO: 1.88 10*3/MM3 (ref 0.7–3.1)
LYMPHOCYTES NFR BLD AUTO: 31.2 % (ref 19.6–45.3)
MCH RBC QN AUTO: 32.6 PG (ref 26.6–33)
MCHC RBC AUTO-ENTMCNC: 33.7 G/DL (ref 31.5–35.7)
MCV RBC AUTO: 96.5 FL (ref 79–97)
MODALITY: ABNORMAL
MONOCYTES # BLD AUTO: 0.66 10*3/MM3 (ref 0.1–0.9)
MONOCYTES NFR BLD AUTO: 11 % (ref 5–12)
NEUTROPHILS NFR BLD AUTO: 2.77 10*3/MM3 (ref 1.7–7)
NEUTROPHILS NFR BLD AUTO: 46 % (ref 42.7–76)
NRBC BLD AUTO-RTO: 0 /100 WBC (ref 0–0.2)
NT-PROBNP SERPL-MCNC: 324 PG/ML (ref 0–1800)
PCO2 BLDA: 39.4 MM HG (ref 35–45)
PH BLDA: 7.38 PH UNITS (ref 7.35–7.45)
PLATELET # BLD AUTO: 216 10*3/MM3 (ref 140–450)
PMV BLD AUTO: 10.1 FL (ref 6–12)
PO2 BLDA: 87.6 MM HG (ref 80–100)
POTASSIUM SERPL-SCNC: 5 MMOL/L (ref 3.5–5.2)
PROCALCITONIN SERPL-MCNC: 0.04 NG/ML (ref 0–0.25)
PROT SERPL-MCNC: 6.1 G/DL (ref 6–8.5)
PROTHROMBIN TIME: 13.5 SECONDS (ref 11.7–14.2)
RBC # BLD AUTO: 3.44 10*6/MM3 (ref 4.14–5.8)
SAO2 % BLDCOA: 96.6 % (ref 92–99)
SODIUM SERPL-SCNC: 135 MMOL/L (ref 136–145)
TOTAL RATE: 16 BREATHS/MINUTE
TROPONIN T SERPL-MCNC: <0.01 NG/ML (ref 0–0.03)
WBC NRBC COR # BLD: 6.02 10*3/MM3 (ref 3.4–10.8)
WHOLE BLOOD HOLD SPECIMEN: NORMAL
WHOLE BLOOD HOLD SPECIMEN: NORMAL

## 2022-04-26 PROCEDURE — 71045 X-RAY EXAM CHEST 1 VIEW: CPT

## 2022-04-26 PROCEDURE — 36600 WITHDRAWAL OF ARTERIAL BLOOD: CPT

## 2022-04-26 PROCEDURE — 85025 COMPLETE CBC W/AUTO DIFF WBC: CPT | Performed by: EMERGENCY MEDICINE

## 2022-04-26 PROCEDURE — 99284 EMERGENCY DEPT VISIT MOD MDM: CPT

## 2022-04-26 PROCEDURE — 93010 ELECTROCARDIOGRAM REPORT: CPT | Performed by: INTERNAL MEDICINE

## 2022-04-26 PROCEDURE — 84484 ASSAY OF TROPONIN QUANT: CPT | Performed by: EMERGENCY MEDICINE

## 2022-04-26 PROCEDURE — 83605 ASSAY OF LACTIC ACID: CPT | Performed by: EMERGENCY MEDICINE

## 2022-04-26 PROCEDURE — 84145 PROCALCITONIN (PCT): CPT | Performed by: EMERGENCY MEDICINE

## 2022-04-26 PROCEDURE — 87040 BLOOD CULTURE FOR BACTERIA: CPT | Performed by: EMERGENCY MEDICINE

## 2022-04-26 PROCEDURE — 80053 COMPREHEN METABOLIC PANEL: CPT | Performed by: EMERGENCY MEDICINE

## 2022-04-26 PROCEDURE — 93005 ELECTROCARDIOGRAM TRACING: CPT | Performed by: EMERGENCY MEDICINE

## 2022-04-26 PROCEDURE — 85610 PROTHROMBIN TIME: CPT | Performed by: EMERGENCY MEDICINE

## 2022-04-26 PROCEDURE — 82803 BLOOD GASES ANY COMBINATION: CPT

## 2022-04-26 PROCEDURE — 83880 ASSAY OF NATRIURETIC PEPTIDE: CPT | Performed by: EMERGENCY MEDICINE

## 2022-04-26 RX ORDER — SODIUM CHLORIDE 0.9 % (FLUSH) 0.9 %
10 SYRINGE (ML) INJECTION AS NEEDED
Status: DISCONTINUED | OUTPATIENT
Start: 2022-04-26 | End: 2022-04-26 | Stop reason: HOSPADM

## 2022-04-26 NOTE — DISCHARGE INSTRUCTIONS
Continue taking your home medications including your calcium carbonate (Tums).  Some of your medications, such as the Zanaflex, oxycodone and gabapentin, can make you tired.  You follow-up with your family doctor to see if your medications can be adjusted.

## 2022-04-26 NOTE — ED PROVIDER NOTES
EMERGENCY DEPARTMENT ENCOUNTER  I wore full protective equipment throughout this patient encounter including a N95 mask, eye shield, gown and gloves. Hand hygiene was performed before donning protective equipment and after removal when leaving the room.    Room Number:  39/39  Date of encounter:  4/26/2022  PCP: Erich Aviles MD    HPI:  Context: Tony Leonard is a 81 y.o. male who presents to the ED c/o chief complaint of increasing general body weakness for 2 days with lethargy.  Patient has noted dyspnea on exertion for 2 days along with feeling more tired.  He states he had similar episode several weeks ago when he stopped taking his potassium supplements with his diuretic.  He realized that he may be hypokalemic and he restarted his potassium 7 days ago.  Patient denies fever, chills, cough, orthopnea or chest discomfort.  He states he was supposed to see his pulmonologist, Dr. Salter, yesterday but he missed the appointment because he was sleeping.  He does have a history of COPD along with bronchiectasis.  Patient was brought here today because he was reportedly hypotensive.  When EMS arrived, patient's blood pressure was normal and he was ambulatory.    MEDICAL HISTORY REVIEW  Reviewed in EPIC    PAST MEDICAL HISTORY  Active Ambulatory Problems     Diagnosis Date Noted   • Thrush, oral 03/11/2016   • ADD (attention deficit disorder) 03/11/2016   • Hemorrhoids 03/11/2016   • Bronchiectasis with acute lower respiratory infection (HCC) 03/11/2016   • Diverticul disease small and large intestine, no perforati or abscess 03/11/2016   • Benign non-nodular prostatic hyperplasia without lower urinary tract symptoms 03/11/2016   • Gastroesophageal reflux disease 03/11/2016   • Malaise and fatigue 03/11/2016   • Environmental allergies 04/25/2016   • Hemoptysis 04/27/2016   • Dyslipidemia 05/11/2016   • Primary osteoarthritis involving multiple joints 05/11/2016   • Pneumonia of right lower lobe due to  infectious organism 10/03/2016   • Abnormal EKG 10/04/2016   • COPD (chronic obstructive pulmonary disease) (Formerly [facility]) 10/04/2016   • Mild malnutrition (Formerly [facility]) 03/28/2017   • Acute on chronic respiratory failure with hypoxia (Formerly [facility]) 04/27/2017   • Fracture, intertrochanteric, right femur, closed, initial encounter (Formerly [facility]) 01/16/2018   • Hematoma of frontal scalp 01/16/2018   • Postoperative anemia due to acute blood loss 01/19/2018   • Urinary retention 01/25/2018   • Hemorrhagic disorder due to circulating anticoagulants (Formerly [facility]) 01/29/2018   • History of fracture of right hip 02/22/2018   • Closed fracture of right hip with routine healing 02/28/2018   • Chronic low back pain with sciatica 06/21/2018   • Change in bowel function 04/18/2019   • Rectal bleeding 04/18/2019   • Prostate cancer ([facility]) 04/22/2019   • On home oxygen therapy 09/24/2019   • Acute viral bronchitis 12/29/2019   • Acute on chronic systolic (congestive) heart failure (Formerly [facility]) 10/14/2020   • Peripheral neuropathy 07/01/2021   • Plantar fasciitis 09/08/2021     Resolved Ambulatory Problems     Diagnosis Date Noted   • Pneumonia of both lower lobes due to infectious organism 03/11/2016   • Pneumonia of right upper lobe due to infectious organism 04/22/2016   • COPD exacerbation ([facility]) 04/25/2016   • GERD (gastroesophageal reflux disease) 04/25/2016   • Chronic respiratory failure with hypoxia ([facility]) 10/04/2016   • Bacterial lobar pneumonia 12/27/2016   • Pneumonia of left lower lobe due to infectious organism 03/26/2017   • Bronchitis 06/01/2017   • COPD exacerbation ([facility]) 06/17/2017   • Chronic diastolic CHF (congestive heart failure) (Former[facility]) 01/16/2018   • Leukocytosis 01/16/2018   • Right upper lobe pneumonia 01/20/2018   • Mucus plugging of bronchi 01/16/2018   • COPD exacerbation ([facility]) 04/16/2018   • Degenerative joint disease (DJD) of hip 09/23/2019   • Bronchiectasis with (acute) exacerbation ([facility]) 12/28/2019     Past Medical History:   Diagnosis Date   • ADHD  (attention deficit hyperactivity disorder)    • Bronchiectasis (HCC)    • Colon polyp    • Diverticulosis    • Hard of hearing    • Pneumonia        PAST SURGICAL HISTORY  Past Surgical History:   Procedure Laterality Date   • BRONCHOSCOPY N/A 4/30/2016    Procedure: BRONCHOSCOPY with BAL of right lower lobe and left  lower lobe.  ;  Surgeon: Nando Diaz MD;  Location: [facility] ENDOSCOPY;  Service:    • BRONCHOSCOPY N/A 4/28/2017    Procedure: BRONCHOSCOPY;  Surgeon: Katharina Salter MD;  Location: [facility] ENDOSCOPY;  Service:    • BRONCHOSCOPY Bilateral 6/29/2017    Procedure: BRONCHOSCOPY WITH WASHINGS;  Surgeon: Katharina Salter MD;  Location: [facility] ENDOSCOPY;  Service:    • BRONCHOSCOPY N/A 1/22/2018    Procedure: BRONCHOSCOPY AT BEDSIDE with BAL;  Surgeon: Katharina Salter MD;  Location: [facility] ENDOSCOPY;  Service:    • BRONCHOSCOPY N/A 1/30/2018    Procedure: BRONCHOSCOPY with BAL and washing;  Surgeon: Shreyas Wild MD;  Location: [facility] ENDOSCOPY;  Service:    • BRONCHOSCOPY Bilateral 4/24/2018    Procedure: BRONCHOSCOPY WITH WASHING;  Surgeon: Katharina Salter MD;  Location: [facility] ENDOSCOPY;  Service: Pulmonary   • BRONCHOSCOPY N/A 12/28/2019    Procedure: BRONCHOSCOPY with bilateral washing;  Surgeon: Katharina Salter MD;  Location: [facility] ENDOSCOPY;  Service: Pulmonary   • COLONOSCOPY  05/17/2013    eh, ih, tort, sig tics   • COLONOSCOPY N/A 5/10/2019    Non-thrombosed external hemorrhoids found on perianal exam, Diverticulosis, Tortuous colon, One 5 mm polyp in the mid ascending colon, IH. Path: Tubular adenoma.    • ENDOSCOPY  03/19/2015    z line irreg, hh   • HIP OPEN REDUCTION Right 1/17/2018    Procedure: HIP OPEN REDUCTION INTERNAL FIXATION WITH DYNAMIC HIP SCREW;  Surgeon: Les Black MD;  Location: [facility] MAIN OR;  Service:    • SPINE SURGERY     • TONSILLECTOMY     • TOTAL HIP ARTHROPLASTY REVISION Right 9/23/2019    Procedure: HIP  REVISION RIGHT;  Surgeon: Isauro Warner MD;  Location:   [facility];  Service: Orthopedics       FAMILY HISTORY  Family History   Problem Relation Age of Onset   • Thyroid disease Mother    • No Known Problems Father    • Malig Hyperthermia Neg Hx        SOCIAL HISTORY  Social History     Socioeconomic History   • Marital status: Single   Tobacco Use   • Smoking status: Never Smoker   • Smokeless tobacco: Never Used   Substance and Sexual Activity   • Alcohol use: No     Comment: red wine occassional   • Drug use: No   • Sexual activity: Defer       ALLERGIES  Daliresp [roflumilast], Latex, and Sulfa antibiotics    The patient's allergies have been reviewed    REVIEW OF SYSTEMS  All systems reviewed and negative except for those discussed in HPI.     PHYSICAL EXAM  I have reviewed the triage vital signs and nursing notes.  ED Triage Vitals [04/26/22 1409]   Temp Heart Rate Resp BP SpO2   97.7 °F (36.5 °C) 60 16 100/65 96 %      Temp src Heart Rate Source Patient Position BP Location FiO2 (%)   Tympanic Monitor -- -- --       General: Mild distress.  HENT: NCAT, PERRL, Nares patent.  Eyes: no scleral icterus.  Extraocular movements are intact.    Neck: trachea midline, no ROM limitations.  CV: regular rhythm, regular rate.  Respiratory: normal effort, rhonchi and rales noted in the bases, left greater than right.  Abdomen: soft, nondistended, NTTP, no rebound tenderness, no guarding or rigidity.  Musculoskeletal: no deformity.  2+ pedal edema bilaterally.  Neuro: alert, moves all extremities, follows commands.  Skin: warm, dry.    LAB RESULTS  Recent Results (from the past 24 hour(s))   Green Top (Gel)    Collection Time: 04/26/22  2:23 PM   Result Value Ref Range    Extra Tube Hold for add-ons.    Lavender Top    Collection Time: 04/26/22  2:23 PM   Result Value Ref Range    Extra Tube hold for add-on    Light Blue Top    Collection Time: 04/26/22  2:23 PM   Result Value Ref Range    Extra Tube hold for add-on    Comprehensive Metabolic Panel    Collection Time: 04/26/22   2:23 PM    Specimen: Blood   Result Value Ref Range    Glucose 99 65 - 99 mg/dL    BUN 26 (H) 8 - 23 mg/dL    Creatinine 1.00 0.76 - 1.27 mg/dL    Sodium 135 (L) 136 - 145 mmol/L    Potassium 5.0 3.5 - 5.2 mmol/L    Chloride 104 98 - 107 mmol/L    CO2 20.9 (L) 22.0 - 29.0 mmol/L    Calcium 8.5 (L) 8.6 - 10.5 mg/dL    Total Protein 6.1 6.0 - 8.5 g/dL    Albumin 3.70 3.50 - 5.20 g/dL    ALT (SGPT) 10 1 - 41 U/L    AST (SGOT) 20 1 - 40 U/L    Alkaline Phosphatase 94 39 - 117 U/L    Total Bilirubin 0.3 0.0 - 1.2 mg/dL    Globulin 2.4 gm/dL    A/G Ratio 1.5 g/dL    BUN/Creatinine Ratio 26.0 (H) 7.0 - 25.0    Anion Gap 10.1 5.0 - 15.0 mmol/L    eGFR 74.7 >60.0 mL/min/1.73   Protime-INR    Collection Time: 04/26/22  2:23 PM    Specimen: Blood   Result Value Ref Range    Protime 13.5 11.7 - 14.2 Seconds    INR 1.04 0.90 - 1.10   Troponin    Collection Time: 04/26/22  2:23 PM    Specimen: Blood   Result Value Ref Range    Troponin T <0.010 0.000 - 0.030 ng/mL   BNP    Collection Time: 04/26/22  2:23 PM    Specimen: Blood   Result Value Ref Range    proBNP 324.0 0.0 - 1,800.0 pg/mL   Procalcitonin    Collection Time: 04/26/22  2:23 PM    Specimen: Blood   Result Value Ref Range    Procalcitonin 0.04 0.00 - 0.25 ng/mL   CBC Auto Differential    Collection Time: 04/26/22  2:23 PM    Specimen: Blood   Result Value Ref Range    WBC 6.02 3.40 - 10.80 10*3/mm3    RBC 3.44 (L) 4.14 - 5.80 10*6/mm3    Hemoglobin 11.2 (L) 13.0 - 17.7 g/dL    Hematocrit 33.2 (L) 37.5 - 51.0 %    MCV 96.5 79.0 - 97.0 fL    MCH 32.6 26.6 - 33.0 pg    MCHC 33.7 31.5 - 35.7 g/dL    RDW 12.7 12.3 - 15.4 %    RDW-SD 44.6 37.0 - 54.0 fl    MPV 10.1 6.0 - 12.0 fL    Platelets 216 140 - 450 10*3/mm3    Neutrophil % 46.0 42.7 - 76.0 %    Lymphocyte % 31.2 19.6 - 45.3 %    Monocyte % 11.0 5.0 - 12.0 %    Eosinophil % 10.5 (H) 0.3 - 6.2 %    Basophil % 1.3 0.0 - 1.5 %    Immature Grans % 0.0 0.0 - 0.5 %    Neutrophils, Absolute 2.77 1.70 - 7.00 10*3/mm3     Lymphocytes, Absolute 1.88 0.70 - 3.10 10*3/mm3    Monocytes, Absolute 0.66 0.10 - 0.90 10*3/mm3    Eosinophils, Absolute 0.63 (H) 0.00 - 0.40 10*3/mm3    Basophils, Absolute 0.08 0.00 - 0.20 10*3/mm3    Immature Grans, Absolute 0.00 0.00 - 0.05 10*3/mm3    nRBC 0.0 0.0 - 0.2 /100 WBC   Lactic Acid, Plasma    Collection Time: 04/26/22  2:49 PM    Specimen: Blood   Result Value Ref Range    Lactate 0.9 0.5 - 2.0 mmol/L   Blood Gas, Arterial -    Collection Time: 04/26/22  4:07 PM    Specimen: Arterial Blood   Result Value Ref Range    Site Arterial: right radial     Oniel's Test Positive     pH, Arterial 7.382 7.350 - 7.450 pH units    pCO2, Arterial 39.4 35.0 - 45.0 mm Hg    pO2, Arterial 87.6 80.0 - 100.0 mm Hg    HCO3, Arterial 23.4 22.0 - 28.0 mmol/L    Base Excess, Arterial -1.5 (L) 0.0 - 2.0 mmol/L    O2 Saturation Calculated 96.6 92.0 - 99.0 %    Barometric Pressure for Blood Gas 761.1 mmHg    Modality Room Air     Rate 16 Breaths/minute       I ordered the above labs and reviewed the results.    RADIOLOGY  XR Chest 1 View    Result Date: 4/26/2022  SINGLE VIEW CHEST RADIOGRAPH  HISTORY: 81-year-old male with a history of shortness of air.  FINDINGS: An upright AP portable chest radiograph was obtained. Comparison is made to a chest radiograph dated 09/23/2019 and to a CT of the chest dated 04/15/2020. There is interstitial scarring within the bilateral upper lobes and to a lesser degree at both lung bases. The lungs are clear of consolidation. The cardiomediastinal silhouette is within normal limits. Osteopenia is noted.      There is scattered interstitial scarring in both lungs. No evidence for a superimposed acute process is appreciated.  This report was finalized on 4/26/2022 4:10 PM by Dr. Alden Lock M.D.        I ordered the above noted radiological studies. I reviewed the images and results. I agree with the radiologist interpretation.    PROCEDURES  Procedures    MEDICATIONS GIVEN IN  ER  Medications   sodium chloride 0.9 % flush 10 mL (has no administration in time range)       PROGRESS, DATA ANALYSIS, CONSULTS, AND MEDICAL DECISION MAKING  A complete history and physical exam have been performed.  All available laboratory and imaging results have been reviewed by myself prior to disposition.    MDM  After the initial H&P, I discussed pertinent information from history and physical exam with patient/family.  Discussed differential diagnosis.  Discussed plan for ED evaluation/workup/treatment.  All questions answered.  Patient/family is agreeable with plan.  ED Course as of 04/26/22 1635   Tue Apr 26, 2022   1439 Patient presents with increasing fatigue and reported hypotension in the assisted living center.  We will rule out sepsis, MI, CHF and pneumonia.  We will check patient's electrolytes and will also obtain a ABG to make sure patient is not in hypercarbic respiratory failure. [DE]   1450 Hemoglobin(!): 11.2 [DE]   1450 WBC: 6.02 [DE]   1517 EKG          EKG time: 1444  Rhythm/Rate: Normal sinus rhythm, rate 55  P waves and WI: Occasional PVC  QRS, axis: normal   ST and T waves: normal     Interpreted Contemporaneously by me, independently viewed  unchanged compared to prior 09/23/2019   [DE]   1626 Troponin T: <0.010 [DE]   1626 Procalcitonin: 0.04 [DE]   1626 BUN(!): 26 [DE]   1627 Patient appears mildly dehydrated very mild hypocalcemia.  It is safe to discharge patient to home and have him follow-up with his family doctor. [DE]   1629 I updated patient on the results of his blood work and x-rays.  We went over his medications and we talked about some of his medications can make you tired such as the Neurontin, Zanaflex and narcotics.  I advised patient to follow-up with his family doctor to see if any of his medications can be adjusted. [DE]      ED Course User Index  [DE] Lenny Luu MD       AS OF 16:35 EDT VITALS:    BP - 150/79  HR - 56  TEMP - 97.7 °F (36.5 °C) (Tympanic)  O2  SATS - 98%    DIAGNOSIS  Final diagnoses:   Dehydration   Hypocalcemia   Lethargy         DISPOSITION    DISCHARGE    Patient discharged in stable condition.    Reviewed implications of results, diagnosis, meds, responsibility to follow up, warning signs and symptoms of possible worsening, potential complications and reasons to return to ER.    Patient/Family voiced understanding of above instructions.    Discussed plan for discharge, as there is no emergent indication for admission. Patient referred to primary care provider for BP management due to today's BP. Pt/family is agreeable and understands need for follow up and repeat testing.  Pt is aware that discharge does not mean that nothing is wrong but it indicates no emergency is present that requires admission and they must continue care with follow-up as given below or physician of their choice.     FOLLOW-UP  Erich Aviles MD  [facility] [id number] [phone]    Schedule an appointment as soon as possible for a visit            Medication List      Changed    * albuterol 1.25 MG/3ML nebulizer solution  Commonly known as: ACCUNEB  What changed: Another medication with the same name was changed. Make sure you understand how and when to take each.     * albuterol (2.5 MG/3ML) 0.083% nebulizer solution  Commonly known as: PROVENTIL  Take 2.5 mg by nebulization 4 (Four) Times a Day.  What changed: when to take this         * This list has 2 medication(s) that are the same as other medications prescribed for you. Read the directions carefully, and ask your doctor or other care provider to review them with you.                 Lenny Luu MD  04/26/22 4272

## 2022-04-26 NOTE — ED TRIAGE NOTES
Patient to ed from assisted living by ems with complaints of hypotension. Call for ems was for SOA originally but patient ambulatory with ems and denies SOA. Patient states he does feel weak.

## 2022-04-29 LAB — QT INTERVAL: 483 MS

## 2022-05-01 LAB
BACTERIA SPEC AEROBE CULT: NORMAL
BACTERIA SPEC AEROBE CULT: NORMAL

## 2022-05-02 RX ORDER — METHYLPHENIDATE HYDROCHLORIDE 10 MG/1
10 TABLET ORAL DAILY
Qty: 30 TABLET | Refills: 0 | Status: SHIPPED | OUTPATIENT
Start: 2022-05-02 | End: 2022-06-13 | Stop reason: SDUPTHER

## 2022-05-02 NOTE — TELEPHONE ENCOUNTER
Caller: Horacio Tony OSCAR    Relationship: Self    Best call back number: [phone]     Requested Prescriptions:   Requested Prescriptions     Pending Prescriptions Disp Refills   • methylphenidate (Ritalin) 10 MG tablet 30 tablet 0     Sig: Take 1 tablet by mouth Daily. For ADD 3 months        Pharmacy where request should be sent: [facility] [address] AT [facility] [address] ([facility] [phone] [facility] [phone] [facility]     Additional details provided by patient: OUT OF MEDICATION     Does the patient have less than a 3 day supply:  [x] Yes  [] No    Cherelle Lambert Rep   05/02/22 11:08 EDT

## 2022-05-03 DIAGNOSIS — M54.42 CHRONIC RIGHT-SIDED LOW BACK PAIN WITH LEFT-SIDED SCIATICA: ICD-10-CM

## 2022-05-03 DIAGNOSIS — M41.9 SCOLIOSIS OF LUMBAR SPINE, UNSPECIFIED SCOLIOSIS TYPE: ICD-10-CM

## 2022-05-03 DIAGNOSIS — M79.671 PAIN IN BOTH FEET: ICD-10-CM

## 2022-05-03 DIAGNOSIS — M79.672 PAIN IN BOTH FEET: ICD-10-CM

## 2022-05-03 DIAGNOSIS — M47.816 OSTEOARTHRITIS OF LUMBAR SPINE, UNSPECIFIED SPINAL OSTEOARTHRITIS COMPLICATION STATUS: ICD-10-CM

## 2022-05-03 DIAGNOSIS — M54.16 LUMBAR RADICULOPATHY: ICD-10-CM

## 2022-05-03 DIAGNOSIS — Z98.890 HISTORY OF BACK SURGERY: ICD-10-CM

## 2022-05-03 DIAGNOSIS — G89.29 CHRONIC RIGHT-SIDED LOW BACK PAIN WITH LEFT-SIDED SCIATICA: ICD-10-CM

## 2022-05-03 RX ORDER — OXYCODONE AND ACETAMINOPHEN 7.5; 325 MG/1; MG/1
1 TABLET ORAL EVERY 8 HOURS PRN
Qty: 60 TABLET | Refills: 0 | OUTPATIENT
Start: 2022-05-03

## 2022-05-03 NOTE — TELEPHONE ENCOUNTER
He will need to get it filled at his appointment.  He missed his previous appointment, so I will not send until he's been seen.

## 2022-05-03 NOTE — TELEPHONE ENCOUNTER
Medication Refill Request    Date of phone call: 05/03/2022    Medication being requested: OXYCODONE 7.5-325 MG sig: Take 1 tablet by mouth Every 8 (Eight) Hours As Needed for Severe Pain   Qty: 60    Date of last visit: 02/24/2022    Date of last refill:     NEY up to date?:     Next Follow up?: DOES NOT HAVE APPOINTMENT SCHEDULED     Any new pertinent information? (i.e, new medication allergies, new use of medications, change in patient's health or condition, non-compliance or inconsistency with prescribing agreement?): I will call patient to schedule an appointment .

## 2022-05-04 ENCOUNTER — OFFICE VISIT (OUTPATIENT)
Dept: ORTHOPEDIC SURGERY | Facility: CLINIC | Age: 82
End: 2022-05-04

## 2022-05-04 VITALS — WEIGHT: 177 LBS | TEMPERATURE: 94.6 F | HEIGHT: 71 IN | BODY MASS INDEX: 24.78 KG/M2

## 2022-05-04 DIAGNOSIS — R52 PAIN: Primary | ICD-10-CM

## 2022-05-04 DIAGNOSIS — S32.10XD CLOSED FRACTURE OF SACRUM WITH ROUTINE HEALING, UNSPECIFIED PORTION OF SACRUM, SUBSEQUENT ENCOUNTER: Primary | ICD-10-CM

## 2022-05-04 DIAGNOSIS — S32.10XD CLOSED FRACTURE OF SACRUM WITH ROUTINE HEALING, UNSPECIFIED PORTION OF SACRUM, SUBSEQUENT ENCOUNTER: ICD-10-CM

## 2022-05-04 PROCEDURE — 99212 OFFICE O/P EST SF 10 MIN: CPT | Performed by: ORTHOPAEDIC SURGERY

## 2022-05-04 PROCEDURE — 72170 X-RAY EXAM OF PELVIS: CPT | Performed by: ORTHOPAEDIC SURGERY

## 2022-05-04 NOTE — PROGRESS NOTES
He is doing much better now and walking without difficulty, relatively speaking as he has other issues.  He denies any sacral pain at this point.  X-rays demonstrate no change in position of nondisplaced fractures and I suspect they are healed at this point compared to prior films.  At this point he can resume normal activity and follow-up as needed and I am going to prescribe physical therapy.

## 2022-05-05 ENCOUNTER — OFFICE VISIT (OUTPATIENT)
Dept: PAIN MEDICINE | Facility: CLINIC | Age: 82
End: 2022-05-05

## 2022-05-05 ENCOUNTER — TELEPHONE (OUTPATIENT)
Dept: FAMILY MEDICINE CLINIC | Facility: CLINIC | Age: 82
End: 2022-05-05

## 2022-05-05 VITALS
WEIGHT: 177 LBS | BODY MASS INDEX: 24.78 KG/M2 | TEMPERATURE: 97.7 F | HEIGHT: 71 IN | DIASTOLIC BLOOD PRESSURE: 46 MMHG | RESPIRATION RATE: 18 BRPM | SYSTOLIC BLOOD PRESSURE: 79 MMHG | OXYGEN SATURATION: 93 % | HEART RATE: 68 BPM

## 2022-05-05 DIAGNOSIS — G89.29 CHRONIC RIGHT-SIDED LOW BACK PAIN WITH LEFT-SIDED SCIATICA: Primary | ICD-10-CM

## 2022-05-05 DIAGNOSIS — Z79.899 HIGH RISK MEDICATION USE: ICD-10-CM

## 2022-05-05 DIAGNOSIS — Z98.890 HISTORY OF BACK SURGERY: ICD-10-CM

## 2022-05-05 DIAGNOSIS — M54.16 LUMBAR RADICULOPATHY: ICD-10-CM

## 2022-05-05 DIAGNOSIS — M54.42 CHRONIC RIGHT-SIDED LOW BACK PAIN WITH LEFT-SIDED SCIATICA: Primary | ICD-10-CM

## 2022-05-05 PROCEDURE — 99215 OFFICE O/P EST HI 40 MIN: CPT | Performed by: NURSE PRACTITIONER

## 2022-05-05 RX ORDER — MIRABEGRON 25 MG/1
TABLET, FILM COATED, EXTENDED RELEASE ORAL
Status: ON HOLD | COMMUNITY
Start: 2022-04-27 | End: 2022-11-14

## 2022-05-05 RX ORDER — POTASSIUM CHLORIDE 1500 MG/1
TABLET, FILM COATED, EXTENDED RELEASE ORAL
COMMUNITY
Start: 2022-04-21 | End: 2022-05-06 | Stop reason: SDUPTHER

## 2022-05-05 NOTE — TELEPHONE ENCOUNTER
The patient is a 16y Male complaining of allergic reaction. bp low, needs to be seen. It's because of his meds he said.. I told him to go to urgent care or ER. He said it's not serious. I don't have lexi aviability for a while.     Please advise

## 2022-05-05 NOTE — PROGRESS NOTES
CHIEF COMPLAINT  Follow-up for back pain.    Subjective   Tony Leonard is a 81 y.o. male  who presents for follow-up.  He has a history of chronic back pain.    Patient is seen her clinic for chronic back pain.  History of back surgery in 2020.  Has been taking Percocet and tizanidine for his pain since the surgery.  I reviewed Dr. Bhatt's last note which was dated 2/24/2022.  There is a new sacral alar fracture noted on imaging.  Patient was advised to follow-up with Dr. Mahan for recommendations and treatment.  As long as Dr. Mahan is not opposed we will plan to move forward with epidural injection as previously discussed with patient.  She also agreed to continue his Percocet and take over his gabapentin.    Patient saw Dr. Gordon yesterday.  I reviewed his note.  Patient is doing much better regarding the sacral fracture.  Suspect fracture is healed based on x-rays.  At this point he can resume normal activity and follow-up as needed.  Also recommending physical therapy.    Pain today 5/10 VAS.  Location of pain is in the lower lumbar area. His pain radiates into the right lateral/anterior leg.  His pain is aggravated by standing. Improves with sitting.  Patient reports that he takes Percocet 7.5-325 twice daily.  This is not consistent with his [facility] report which shows that he has not filled the medication over 2 months, quantity 60.  He also reports that when he takes the medication it causes drowsiness.  He admits to driving after taking the medication despite feeling drowsy.  He is also prescribed tizanidine 6 mg at bedtime by another provider.  He reports that he also takes this medication whenever he wants.  It also causes drowsiness.  Prescribe Neurontin 100 mg at bedtime.  Unclear how he takes this as well.    Hypotensive - denies dizziness, lightheadedness, SOA, chest pain. He does admit to feeling a bit sluggish.  He is not taking Lasix.     Patient remained masked during entire encounter. No  "cough present. I donned a mask and eye protection throughout entire visit. Prior to donning mask and eye protection, hand hygiene was performed, as well as when it was doffed.  I was closer than 6 feet, but not for an extended period of time. No obvious exposure to any bodily fluids.    History of Present Illness     PEG Assessment   What number best describes your pain on average in the past week?7  What number best describes how, during the past week, pain has interfered with your enjoyment of life?5  What number best describes how, during the past week, pain has interfered with your general activity?  5    The following portions of the patient's history were reviewed and updated as appropriate: allergies, current medications, past family history, past medical history, past social history, past surgical history and problem list.    Review of Systems   Constitutional: Negative for fatigue.   HENT: Positive for congestion.    Eyes: Negative for visual disturbance.   Respiratory: Positive for shortness of breath. Negative for cough and wheezing.    Cardiovascular: Negative for chest pain.   Gastrointestinal: Negative for constipation and diarrhea.   Genitourinary: Negative for difficulty urinating.   Musculoskeletal: Positive for back pain.   Neurological: Positive for weakness and numbness.   Psychiatric/Behavioral: Positive for sleep disturbance. Negative for suicidal ideas. The patient is nervous/anxious.      I have reviewed and confirmed the accuracy of the ROS as documented by the MA/JENNIFER/RN PABLITO Estrada    Vitals:    05/05/22 0841   BP: (!) 79/46   Pulse: 68   Resp: 18   Temp: 97.7 °F (36.5 °C)   SpO2: 93%   Weight: 80.3 kg (177 lb)   Height: 180.3 cm (71\")   PainSc:   5   PainLoc: Back     --- 74/46 manual bp 09:00    Objective   Physical Exam  Vitals and nursing note reviewed.   Constitutional:       General: He is not in acute distress.     Appearance: Normal appearance. He is not ill-appearing. "   Pulmonary:      Effort: Pulmonary effort is normal. No respiratory distress.   Skin:     General: Skin is warm and dry.   Neurological:      Mental Status: He is alert and oriented to person, place, and time.      Gait: Gait abnormal (slowed).       Assessment/Plan   Diagnoses and all orders for this visit:    1. 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 Lumbar radiculopathy    3. History of back surgery    4. High risk medication use      Patient was extremely hypotensive in the office today.  We checked his blood pressure multiple times.  Including manual auscultation.  It remained in the mid to upper 70s systolic over the 40s diastolic.  He denied chest pain, shortness of air, dizziness, drowsiness.  He did report that he felt a little tired and fatigued.  I discussed with the patient that I was not comfortable with him driving himself home in this condition.  I explained to him that his vital signs would not be considered stable.  Patient was adamant that he was going to drive himself home.  He did not want to leave his car in the parking lot.  We attempted to contact all of the listed family members on the patient's chart without success.  Another attempt was made to convince the patient to call a friend for a ride or leave via EMS.  He continued to decline.  This was witnessed by my  as well as another APRN (Paz Armenta).  The patient continued to decline our recommendations.  Ultimately he signed refusal to leave via ambulance form as well as refusal to use alternate  form.    --- left AMA approx 930AM    --- No more controlled substances will be prescribed by this clinic.  This is not the first time that the patient has been hypotensive in the office.  There have also been multiple discrepancies regarding the way he reports to take his medication versus the way he feels the medication and actually takes it.  He is a very poor historian when it comes to  his medication regimen.  He oftentimes gets names of medications confused, the indication for the medications confused, and the proper dosing confused.  He admits to driving after taking medication despite feeling drowsy.  Furthermore there is a refill on [facility] report for hydrocodone from another prescriber, patient is unsure who this provider was or the reason for the medication, this is a violation of the prescribing agreement.  For these multiple reasons I have discussed the case with Dr. Bhatt who agrees that controlled substances are no longer in his best interest.  Weaning dose will not be necessary due to the fact that the patient has not refilled the medication in over 2 months which indicates much more sparing use than he reports.    --- We could see the patient in the future if he would be interested in considering interventional options only.  Once again we will not engage in any further medication management.    --- MA (Monica Rai) did speak to the patient later today.  She confirmed that he made it back to his apartment safely.    --- Follow-up PRN         As the clinician, I personally reviewed the NEY from 5/6/2022 while the patient was in the office today.    This document is intended for medical expert use only. Reading of this document by patients and/or patient's family without participating medical staff guidance may result in misinterpretation and unintended morbidity.   Any interpretation of such data is the responsibility of the patient and/or family member responsible for the patient in concert with their primary or specialist providers, not to be left for sources of online searches such as [employer], [employer] or similar queries. Relying on these approaches to knowledge may result in misinterpretation, misguided goals of care and even death should patients or family members try recommendations outside of the realm of professional medical care in a supervised way.    I spent 40 minutes  caring for Tony on this date of service. This time includes time spent by me in the following activities: preparing for the visit, counseling and educating the patient/family/caregiver, documenting information in the medical record and care coordination

## 2022-05-06 ENCOUNTER — OFFICE VISIT (OUTPATIENT)
Dept: FAMILY MEDICINE CLINIC | Facility: CLINIC | Age: 82
End: 2022-05-06

## 2022-05-06 ENCOUNTER — TELEPHONE (OUTPATIENT)
Dept: FAMILY MEDICINE CLINIC | Facility: CLINIC | Age: 82
End: 2022-05-06

## 2022-05-06 ENCOUNTER — TELEPHONE (OUTPATIENT)
Dept: PAIN MEDICINE | Facility: CLINIC | Age: 82
End: 2022-05-06

## 2022-05-06 VITALS
HEIGHT: 71 IN | DIASTOLIC BLOOD PRESSURE: 72 MMHG | WEIGHT: 174.2 LBS | SYSTOLIC BLOOD PRESSURE: 138 MMHG | BODY MASS INDEX: 24.39 KG/M2 | HEART RATE: 84 BPM | TEMPERATURE: 97.5 F | OXYGEN SATURATION: 96 %

## 2022-05-06 DIAGNOSIS — S72.141A FRACTURE, INTERTROCHANTERIC, RIGHT FEMUR, CLOSED, INITIAL ENCOUNTER: ICD-10-CM

## 2022-05-06 DIAGNOSIS — J44.9 CHRONIC OBSTRUCTIVE PULMONARY DISEASE, UNSPECIFIED COPD TYPE: Primary | ICD-10-CM

## 2022-05-06 DIAGNOSIS — R30.0 DYSURIA: ICD-10-CM

## 2022-05-06 PROBLEM — I10 PRIMARY HYPERTENSION: Status: ACTIVE | Noted: 2022-05-06

## 2022-05-06 PROBLEM — I10 PRIMARY HYPERTENSION: Status: RESOLVED | Noted: 2022-05-06 | Resolved: 2022-05-06

## 2022-05-06 LAB
BACTERIA UR QL AUTO: ABNORMAL /HPF
BILIRUB UR QL STRIP: NEGATIVE
CLARITY UR: CLEAR
COLOR UR: YELLOW
GLUCOSE UR STRIP-MCNC: NEGATIVE MG/DL
HGB UR QL STRIP.AUTO: ABNORMAL
KETONES UR QL STRIP: NEGATIVE
LEUKOCYTE ESTERASE UR QL STRIP.AUTO: NEGATIVE
NITRITE UR QL STRIP: NEGATIVE
PH UR STRIP.AUTO: <=5 [PH] (ref 4.6–8)
PROT UR QL STRIP: NEGATIVE
RBC # UR STRIP: ABNORMAL /HPF
REF LAB TEST METHOD: ABNORMAL
SP GR UR STRIP: 1.02 (ref 1–1.03)
SQUAMOUS #/AREA URNS HPF: ABNORMAL /HPF
UROBILINOGEN UR QL STRIP: ABNORMAL
WBC # UR STRIP: ABNORMAL /HPF

## 2022-05-06 PROCEDURE — 81001 URINALYSIS AUTO W/SCOPE: CPT | Performed by: INTERNAL MEDICINE

## 2022-05-06 PROCEDURE — 87086 URINE CULTURE/COLONY COUNT: CPT | Performed by: INTERNAL MEDICINE

## 2022-05-06 PROCEDURE — 99213 OFFICE O/P EST LOW 20 MIN: CPT | Performed by: INTERNAL MEDICINE

## 2022-05-06 NOTE — PROGRESS NOTES
Subjective   Tony Leonard is a 81 y.o. male.     Vitals:    05/06/22 1408   BP: 138/72   Pulse: 84   Temp: 97.5 °F (36.4 °C)   SpO2: 96%      Body mass index is 24.3 kg/m².     History of Present Illness   Patient was seen for COPD.  Patient uses albuterol rescue and mini neb solution.  Patient is stable with this treatment.  Patient does have a fractured femur and sees the pain clinic for pain medication.  His previous visit he was hypertension and would not write any medication.  Patient's blood pressure in the office today was 130/70.  He will monitor it at weekly at home.  Patient did have dysuria and urinalysis shows hematuria.  Patient is deciding whether he wants to see urology..    Dictated utilizing Dragon dictation. If there are questions or for further clarification, please contact me.  The following portions of the patient's history were reviewed and updated as appropriate: allergies, current medications, past family history, past medical history, past social history, past surgical history and problem list.    Review of Systems   Constitutional: Negative for fatigue and fever.   HENT: Positive for congestion. Negative for trouble swallowing.    Eyes: Negative for discharge and visual disturbance.   Respiratory: Negative for choking and shortness of breath.    Cardiovascular: Negative for chest pain and palpitations.   Gastrointestinal: Negative for abdominal pain and blood in stool.   Endocrine: Negative.    Genitourinary: Positive for hematuria. Negative for genital sores.   Musculoskeletal: Negative for gait problem and joint swelling.   Skin: Negative for color change, pallor, rash and wound.   Allergic/Immunologic: Positive for environmental allergies. Negative for immunocompromised state.   Neurological: Negative for facial asymmetry and speech difficulty.   Psychiatric/Behavioral: Negative for hallucinations and suicidal ideas.       Objective   Physical Exam  Vitals and nursing note reviewed.    Constitutional:       Appearance: Normal appearance. He is well-developed.   HENT:      Head: Normocephalic and atraumatic.      Nose: Nose normal.      Mouth/Throat:      Mouth: Mucous membranes are moist.      Pharynx: Oropharynx is clear.   Eyes:      Extraocular Movements: Extraocular movements intact.      Conjunctiva/sclera: Conjunctivae normal.      Pupils: Pupils are equal, round, and reactive to light.   Cardiovascular:      Rate and Rhythm: Normal rate and regular rhythm.      Heart sounds: Normal heart sounds. No murmur heard.    No friction rub. No gallop.   Pulmonary:      Effort: Pulmonary effort is normal. No respiratory distress.      Breath sounds: Normal breath sounds. No stridor. No wheezing, rhonchi or rales.   Chest:      Chest wall: No tenderness.   Abdominal:      General: Bowel sounds are normal.      Palpations: Abdomen is soft.   Musculoskeletal:         General: Normal range of motion.      Cervical back: Normal range of motion and neck supple.   Skin:     General: Skin is warm and dry.   Neurological:      General: No focal deficit present.      Mental Status: He is alert and oriented to person, place, and time. Mental status is at baseline.   Psychiatric:         Mood and Affect: Mood normal.         Behavior: Behavior normal.         Thought Content: Thought content normal.         Judgment: Judgment normal.         Assessment/Plan #1 continue present bronchodilator No. 2 continue monitoring blood pressure #3 refer to urology for hematuria  Problems Addressed this Visit        Pulmonary and Pneumonias    COPD (chronic obstructive pulmonary disease) ([facility]) - Primary       Other    Fracture, intertrochanteric, right femur, closed, initial encounter ([facility])      Other Visit Diagnoses     Dysuria        Relevant Orders    Urinalysis With Microscopic - Urine, Clean Catch (Completed)    Urine Culture - Urine, Urine, Clean Catch      Diagnoses     Diagnosis Codes Comments    Chronic obstructive  pulmonary disease, unspecified COPD type ([facility])    -  Primary ICD-10-CM: J44.9  ICD-9-CM: 496     Fracture, intertrochanteric, right femur, closed, initial encounter ([facility])     ICD-10-CM: S72.141A  ICD-9-CM: 820.21     Dysuria     ICD-10-CM: R30.0  ICD-9-CM: 788.1

## 2022-05-06 NOTE — TELEPHONE ENCOUNTER
Jackelin from Dr. Aviles's office called to inform us about patients BP was seen today in his office and was 138/72 stated his BP has never been low. If you have any questions to contact Dr. Aviles .

## 2022-05-07 ENCOUNTER — APPOINTMENT (OUTPATIENT)
Dept: GENERAL RADIOLOGY | Facility: HOSPITAL | Age: 82
End: 2022-05-07

## 2022-05-07 ENCOUNTER — HOSPITAL ENCOUNTER (OUTPATIENT)
Facility: HOSPITAL | Age: 82
Setting detail: OBSERVATION
Discharge: HOME | End: 2022-05-09
Attending: EMERGENCY MEDICINE | Admitting: INTERNAL MEDICINE

## 2022-05-07 DIAGNOSIS — R06.02 SHORTNESS OF BREATH: ICD-10-CM

## 2022-05-07 DIAGNOSIS — J44.1 COPD EXACERBATION: Primary | ICD-10-CM

## 2022-05-07 DIAGNOSIS — R09.02 HYPOXIA: ICD-10-CM

## 2022-05-07 PROBLEM — R31.9 HEMATURIA: Status: ACTIVE | Noted: 2022-05-07

## 2022-05-07 PROBLEM — R60.0 BILATERAL LOWER EXTREMITY EDEMA: Status: ACTIVE | Noted: 2022-05-07

## 2022-05-07 LAB
ALBUMIN SERPL-MCNC: 4.2 G/DL (ref 3.5–5.2)
ALBUMIN/GLOB SERPL: 1.4 G/DL
ALP SERPL-CCNC: 101 U/L (ref 39–117)
ALT SERPL W P-5'-P-CCNC: 12 U/L (ref 1–41)
ANION GAP SERPL CALCULATED.3IONS-SCNC: 11.1 MMOL/L (ref 5–15)
AST SERPL-CCNC: 15 U/L (ref 1–40)
B PARAPERT DNA SPEC QL NAA+PROBE: NOT DETECTED
B PERT DNA SPEC QL NAA+PROBE: NOT DETECTED
BACTERIA SPEC AEROBE CULT: NO GROWTH
BASOPHILS # BLD AUTO: 0.05 10*3/MM3 (ref 0–0.2)
BASOPHILS NFR BLD AUTO: 0.9 % (ref 0–1.5)
BILIRUB SERPL-MCNC: 0.3 MG/DL (ref 0–1.2)
BUN SERPL-MCNC: 23 MG/DL (ref 8–23)
BUN/CREAT SERPL: 23.7 (ref 7–25)
C PNEUM DNA NPH QL NAA+NON-PROBE: NOT DETECTED
CALCIUM SPEC-SCNC: 9.5 MG/DL (ref 8.6–10.5)
CHLORIDE SERPL-SCNC: 107 MMOL/L (ref 98–107)
CO2 SERPL-SCNC: 23.9 MMOL/L (ref 22–29)
CREAT SERPL-MCNC: 0.97 MG/DL (ref 0.76–1.27)
DEPRECATED RDW RBC AUTO: 48.3 FL (ref 37–54)
EGFRCR SERPLBLD CKD-EPI 2021: 77.5 ML/MIN/1.73
EOSINOPHIL # BLD AUTO: 0.68 10*3/MM3 (ref 0–0.4)
EOSINOPHIL NFR BLD AUTO: 12 % (ref 0.3–6.2)
ERYTHROCYTE [DISTWIDTH] IN BLOOD BY AUTOMATED COUNT: 13 % (ref 12.3–15.4)
FLUAV SUBTYP SPEC NAA+PROBE: NOT DETECTED
FLUBV RNA ISLT QL NAA+PROBE: NOT DETECTED
GLOBULIN UR ELPH-MCNC: 2.9 GM/DL
GLUCOSE SERPL-MCNC: 95 MG/DL (ref 65–99)
HADV DNA SPEC NAA+PROBE: NOT DETECTED
HCOV 229E RNA SPEC QL NAA+PROBE: NOT DETECTED
HCOV HKU1 RNA SPEC QL NAA+PROBE: NOT DETECTED
HCOV NL63 RNA SPEC QL NAA+PROBE: NOT DETECTED
HCOV OC43 RNA SPEC QL NAA+PROBE: NOT DETECTED
HCT VFR BLD AUTO: 41.8 % (ref 37.5–51)
HGB BLD-MCNC: 13.3 G/DL (ref 13–17.7)
HMPV RNA NPH QL NAA+NON-PROBE: NOT DETECTED
HPIV1 RNA ISLT QL NAA+PROBE: NOT DETECTED
HPIV2 RNA SPEC QL NAA+PROBE: NOT DETECTED
HPIV3 RNA NPH QL NAA+PROBE: NOT DETECTED
HPIV4 P GENE NPH QL NAA+PROBE: NOT DETECTED
IMM GRANULOCYTES # BLD AUTO: 0.02 10*3/MM3 (ref 0–0.05)
IMM GRANULOCYTES NFR BLD AUTO: 0.4 % (ref 0–0.5)
LYMPHOCYTES # BLD AUTO: 1.87 10*3/MM3 (ref 0.7–3.1)
LYMPHOCYTES NFR BLD AUTO: 32.9 % (ref 19.6–45.3)
M PNEUMO IGG SER IA-ACNC: NOT DETECTED
MAGNESIUM SERPL-MCNC: 2.4 MG/DL (ref 1.6–2.4)
MCH RBC QN AUTO: 31.7 PG (ref 26.6–33)
MCHC RBC AUTO-ENTMCNC: 31.8 G/DL (ref 31.5–35.7)
MCV RBC AUTO: 99.8 FL (ref 79–97)
MONOCYTES # BLD AUTO: 0.53 10*3/MM3 (ref 0.1–0.9)
MONOCYTES NFR BLD AUTO: 9.3 % (ref 5–12)
NEUTROPHILS NFR BLD AUTO: 2.53 10*3/MM3 (ref 1.7–7)
NEUTROPHILS NFR BLD AUTO: 44.5 % (ref 42.7–76)
NRBC BLD AUTO-RTO: 0 /100 WBC (ref 0–0.2)
NT-PROBNP SERPL-MCNC: 232 PG/ML (ref 0–1800)
PLATELET # BLD AUTO: 228 10*3/MM3 (ref 140–450)
PMV BLD AUTO: 9.3 FL (ref 6–12)
POTASSIUM SERPL-SCNC: 4.5 MMOL/L (ref 3.5–5.2)
PROCALCITONIN SERPL-MCNC: 0.09 NG/ML (ref 0–0.25)
PROT SERPL-MCNC: 7.1 G/DL (ref 6–8.5)
QT INTERVAL: 394 MS
RBC # BLD AUTO: 4.19 10*6/MM3 (ref 4.14–5.8)
RHINOVIRUS RNA SPEC NAA+PROBE: NOT DETECTED
RSV RNA NPH QL NAA+NON-PROBE: NOT DETECTED
SARS-COV-2 RNA NPH QL NAA+NON-PROBE: NOT DETECTED
SODIUM SERPL-SCNC: 142 MMOL/L (ref 136–145)
TROPONIN T SERPL-MCNC: <0.01 NG/ML (ref 0–0.03)
WBC NRBC COR # BLD: 5.68 10*3/MM3 (ref 3.4–10.8)

## 2022-05-07 PROCEDURE — 94799 UNLISTED PULMONARY SVC/PX: CPT

## 2022-05-07 PROCEDURE — 84484 ASSAY OF TROPONIN QUANT: CPT | Performed by: EMERGENCY MEDICINE

## 2022-05-07 PROCEDURE — 94760 N-INVAS EAR/PLS OXIMETRY 1: CPT

## 2022-05-07 PROCEDURE — 25010000002 MAGNESIUM SULFATE 2 GM/50ML SOLUTION: Performed by: EMERGENCY MEDICINE

## 2022-05-07 PROCEDURE — G0378 HOSPITAL OBSERVATION PER HR: HCPCS

## 2022-05-07 PROCEDURE — 94761 N-INVAS EAR/PLS OXIMETRY MLT: CPT

## 2022-05-07 PROCEDURE — 0202U NFCT DS 22 TRGT SARS-COV-2: CPT | Performed by: PHYSICIAN ASSISTANT

## 2022-05-07 PROCEDURE — 85025 COMPLETE CBC W/AUTO DIFF WBC: CPT | Performed by: EMERGENCY MEDICINE

## 2022-05-07 PROCEDURE — 99284 EMERGENCY DEPT VISIT MOD MDM: CPT

## 2022-05-07 PROCEDURE — 94640 AIRWAY INHALATION TREATMENT: CPT

## 2022-05-07 PROCEDURE — 93005 ELECTROCARDIOGRAM TRACING: CPT

## 2022-05-07 PROCEDURE — 96375 TX/PRO/DX INJ NEW DRUG ADDON: CPT

## 2022-05-07 PROCEDURE — 93010 ELECTROCARDIOGRAM REPORT: CPT | Performed by: INTERNAL MEDICINE

## 2022-05-07 PROCEDURE — 80053 COMPREHEN METABOLIC PANEL: CPT | Performed by: EMERGENCY MEDICINE

## 2022-05-07 PROCEDURE — 71045 X-RAY EXAM CHEST 1 VIEW: CPT

## 2022-05-07 PROCEDURE — 25010000002 METHYLPREDNISOLONE PER 125 MG: Performed by: PHYSICIAN ASSISTANT

## 2022-05-07 PROCEDURE — 83880 ASSAY OF NATRIURETIC PEPTIDE: CPT | Performed by: EMERGENCY MEDICINE

## 2022-05-07 PROCEDURE — 83735 ASSAY OF MAGNESIUM: CPT | Performed by: EMERGENCY MEDICINE

## 2022-05-07 PROCEDURE — 84145 PROCALCITONIN (PCT): CPT | Performed by: EMERGENCY MEDICINE

## 2022-05-07 PROCEDURE — 96365 THER/PROPH/DIAG IV INF INIT: CPT

## 2022-05-07 RX ORDER — DOCUSATE SODIUM 100 MG/1
100 CAPSULE, LIQUID FILLED ORAL 2 TIMES DAILY PRN
Status: DISCONTINUED | OUTPATIENT
Start: 2022-05-07 | End: 2022-05-09 | Stop reason: HOSPADM

## 2022-05-07 RX ORDER — ENOXAPARIN SODIUM 100 MG/ML
40 INJECTION SUBCUTANEOUS EVERY 24 HOURS
Status: DISCONTINUED | OUTPATIENT
Start: 2022-05-08 | End: 2022-05-09 | Stop reason: HOSPADM

## 2022-05-07 RX ORDER — ONDANSETRON 4 MG/1
4 TABLET, FILM COATED ORAL EVERY 6 HOURS PRN
Status: DISCONTINUED | OUTPATIENT
Start: 2022-05-07 | End: 2022-05-09 | Stop reason: HOSPADM

## 2022-05-07 RX ORDER — IPRATROPIUM BROMIDE AND ALBUTEROL SULFATE 2.5; .5 MG/3ML; MG/3ML
3 SOLUTION RESPIRATORY (INHALATION)
Status: DISCONTINUED | OUTPATIENT
Start: 2022-05-07 | End: 2022-05-09 | Stop reason: HOSPADM

## 2022-05-07 RX ORDER — ACETAMINOPHEN 325 MG/1
650 TABLET ORAL EVERY 4 HOURS PRN
Status: DISCONTINUED | OUTPATIENT
Start: 2022-05-07 | End: 2022-05-09 | Stop reason: HOSPADM

## 2022-05-07 RX ORDER — MAGNESIUM SULFATE HEPTAHYDRATE 40 MG/ML
2 INJECTION, SOLUTION INTRAVENOUS ONCE
Status: COMPLETED | OUTPATIENT
Start: 2022-05-07 | End: 2022-05-07

## 2022-05-07 RX ORDER — ALBUTEROL SULFATE 2.5 MG/3ML
2.5 SOLUTION RESPIRATORY (INHALATION) EVERY 6 HOURS PRN
Status: DISCONTINUED | OUTPATIENT
Start: 2022-05-07 | End: 2022-05-09 | Stop reason: HOSPADM

## 2022-05-07 RX ORDER — ALBUTEROL SULFATE 2.5 MG/3ML
2.5 SOLUTION RESPIRATORY (INHALATION)
Status: COMPLETED | OUTPATIENT
Start: 2022-05-07 | End: 2022-05-07

## 2022-05-07 RX ORDER — ONDANSETRON 2 MG/ML
4 INJECTION INTRAMUSCULAR; INTRAVENOUS EVERY 6 HOURS PRN
Status: DISCONTINUED | OUTPATIENT
Start: 2022-05-07 | End: 2022-05-09 | Stop reason: HOSPADM

## 2022-05-07 RX ORDER — METHYLPREDNISOLONE SODIUM SUCCINATE 40 MG/ML
40 INJECTION, POWDER, LYOPHILIZED, FOR SOLUTION INTRAMUSCULAR; INTRAVENOUS DAILY
Status: DISCONTINUED | OUTPATIENT
Start: 2022-05-08 | End: 2022-05-08

## 2022-05-07 RX ORDER — IPRATROPIUM BROMIDE AND ALBUTEROL SULFATE 2.5; .5 MG/3ML; MG/3ML
3 SOLUTION RESPIRATORY (INHALATION) ONCE
Status: COMPLETED | OUTPATIENT
Start: 2022-05-07 | End: 2022-05-07

## 2022-05-07 RX ORDER — METHYLPREDNISOLONE SODIUM SUCCINATE 125 MG/2ML
80 INJECTION, POWDER, LYOPHILIZED, FOR SOLUTION INTRAMUSCULAR; INTRAVENOUS EVERY 12 HOURS
Status: DISCONTINUED | OUTPATIENT
Start: 2022-05-08 | End: 2022-05-07

## 2022-05-07 RX ORDER — METHYLPREDNISOLONE SODIUM SUCCINATE 125 MG/2ML
125 INJECTION, POWDER, LYOPHILIZED, FOR SOLUTION INTRAMUSCULAR; INTRAVENOUS ONCE
Status: COMPLETED | OUTPATIENT
Start: 2022-05-07 | End: 2022-05-07

## 2022-05-07 RX ORDER — UREA 10 %
3 LOTION (ML) TOPICAL NIGHTLY PRN
Status: DISCONTINUED | OUTPATIENT
Start: 2022-05-07 | End: 2022-05-09 | Stop reason: HOSPADM

## 2022-05-07 RX ORDER — NITROGLYCERIN 0.4 MG/1
0.4 TABLET SUBLINGUAL
Status: DISCONTINUED | OUTPATIENT
Start: 2022-05-07 | End: 2022-05-09 | Stop reason: HOSPADM

## 2022-05-07 RX ADMIN — MAGNESIUM SULFATE 2 G: 2 INJECTION INTRAVENOUS at 21:30

## 2022-05-07 RX ADMIN — ALBUTEROL SULFATE 2.5 MG: 2.5 SOLUTION RESPIRATORY (INHALATION) at 21:53

## 2022-05-07 RX ADMIN — ALBUTEROL SULFATE 2.5 MG: 2.5 SOLUTION RESPIRATORY (INHALATION) at 20:44

## 2022-05-07 RX ADMIN — METHYLPREDNISOLONE SODIUM SUCCINATE 125 MG: 125 INJECTION, POWDER, FOR SOLUTION INTRAMUSCULAR; INTRAVENOUS at 20:43

## 2022-05-07 RX ADMIN — IPRATROPIUM BROMIDE AND ALBUTEROL SULFATE 3 ML: .5; 3 SOLUTION RESPIRATORY (INHALATION) at 21:19

## 2022-05-07 NOTE — ED TRIAGE NOTES
[facility]/o SOA. Pt reports chest tightness and difficulty breathing starting shortly after the [employer] race. Pt reports hx of COPD.       Erich Hernandez friend- pts car is at his house. [phone]

## 2022-05-08 PROBLEM — R31.29 MICROSCOPIC HEMATURIA: Status: ACTIVE | Noted: 2022-05-08

## 2022-05-08 PROCEDURE — 94799 UNLISTED PULMONARY SVC/PX: CPT

## 2022-05-08 PROCEDURE — G0378 HOSPITAL OBSERVATION PER HR: HCPCS

## 2022-05-08 PROCEDURE — 97162 PT EVAL MOD COMPLEX 30 MIN: CPT

## 2022-05-08 PROCEDURE — 25010000002 METHYLPREDNISOLONE PER 40 MG: Performed by: INTERNAL MEDICINE

## 2022-05-08 PROCEDURE — 94664 DEMO&/EVAL PT USE INHALER: CPT

## 2022-05-08 PROCEDURE — 25010000002 ENOXAPARIN PER 10 MG

## 2022-05-08 PROCEDURE — 96376 TX/PRO/DX INJ SAME DRUG ADON: CPT

## 2022-05-08 PROCEDURE — 96372 THER/PROPH/DIAG INJ SC/IM: CPT

## 2022-05-08 PROCEDURE — 97530 THERAPEUTIC ACTIVITIES: CPT

## 2022-05-08 RX ORDER — FUROSEMIDE 40 MG/1
40 TABLET ORAL DAILY
Status: DISCONTINUED | OUTPATIENT
Start: 2022-05-08 | End: 2022-05-09 | Stop reason: HOSPADM

## 2022-05-08 RX ORDER — TIZANIDINE 4 MG/1
6 TABLET ORAL NIGHTLY
Refills: 3 | Status: DISCONTINUED | OUTPATIENT
Start: 2022-05-08 | End: 2022-05-09 | Stop reason: HOSPADM

## 2022-05-08 RX ORDER — ROPINIROLE 0.5 MG/1
0.5 TABLET, FILM COATED ORAL NIGHTLY
Status: DISCONTINUED | OUTPATIENT
Start: 2022-05-08 | End: 2022-05-09 | Stop reason: HOSPADM

## 2022-05-08 RX ORDER — BUDESONIDE 0.5 MG/2ML
0.5 INHALANT ORAL 2 TIMES DAILY
Status: DISCONTINUED | OUTPATIENT
Start: 2022-05-08 | End: 2022-05-09 | Stop reason: HOSPADM

## 2022-05-08 RX ORDER — HYDROCODONE BITARTRATE AND ACETAMINOPHEN 5; 325 MG/1; MG/1
1 TABLET ORAL EVERY 4 HOURS PRN
Status: DISCONTINUED | OUTPATIENT
Start: 2022-05-08 | End: 2022-05-09 | Stop reason: HOSPADM

## 2022-05-08 RX ORDER — PREDNISONE 20 MG/1
40 TABLET ORAL
Status: DISCONTINUED | OUTPATIENT
Start: 2022-05-09 | End: 2022-05-09 | Stop reason: HOSPADM

## 2022-05-08 RX ORDER — GABAPENTIN 100 MG/1
100 CAPSULE ORAL NIGHTLY
Status: DISCONTINUED | OUTPATIENT
Start: 2022-05-08 | End: 2022-05-09 | Stop reason: HOSPADM

## 2022-05-08 RX ORDER — PANTOPRAZOLE SODIUM 40 MG/1
40 TABLET, DELAYED RELEASE ORAL DAILY
Status: DISCONTINUED | OUTPATIENT
Start: 2022-05-08 | End: 2022-05-09 | Stop reason: HOSPADM

## 2022-05-08 RX ORDER — FLUOXETINE HYDROCHLORIDE 20 MG/1
20 CAPSULE ORAL DAILY
Status: DISCONTINUED | OUTPATIENT
Start: 2022-05-08 | End: 2022-05-09 | Stop reason: HOSPADM

## 2022-05-08 RX ORDER — POLYETHYLENE GLYCOL 3350 17 G/17G
17 POWDER, FOR SOLUTION ORAL DAILY
Status: DISCONTINUED | OUTPATIENT
Start: 2022-05-08 | End: 2022-05-09 | Stop reason: HOSPADM

## 2022-05-08 RX ORDER — GUAIFENESIN 600 MG/1
1200 TABLET, EXTENDED RELEASE ORAL EVERY 12 HOURS SCHEDULED
Status: DISCONTINUED | OUTPATIENT
Start: 2022-05-08 | End: 2022-05-09 | Stop reason: HOSPADM

## 2022-05-08 RX ADMIN — GUAIFENESIN 1200 MG: 600 TABLET, EXTENDED RELEASE ORAL at 17:23

## 2022-05-08 RX ADMIN — IPRATROPIUM BROMIDE AND ALBUTEROL SULFATE 3 ML: .5; 3 SOLUTION RESPIRATORY (INHALATION) at 20:22

## 2022-05-08 RX ADMIN — PANTOPRAZOLE SODIUM 40 MG: 40 TABLET, DELAYED RELEASE ORAL at 08:52

## 2022-05-08 RX ADMIN — BUDESONIDE 0.5 MG: 0.5 INHALANT ORAL at 07:32

## 2022-05-08 RX ADMIN — FLUOXETINE HYDROCHLORIDE 20 MG: 20 CAPSULE ORAL at 08:52

## 2022-05-08 RX ADMIN — GABAPENTIN 100 MG: 100 CAPSULE ORAL at 00:48

## 2022-05-08 RX ADMIN — MIRABEGRON 25 MG: 25 TABLET, FILM COATED, EXTENDED RELEASE ORAL at 08:52

## 2022-05-08 RX ADMIN — GUAIFENESIN 1200 MG: 600 TABLET, EXTENDED RELEASE ORAL at 22:14

## 2022-05-08 RX ADMIN — ROPINIROLE HYDROCHLORIDE 0.5 MG: 0.5 TABLET, FILM COATED ORAL at 20:40

## 2022-05-08 RX ADMIN — TIZANIDINE 6 MG: 4 TABLET ORAL at 22:14

## 2022-05-08 RX ADMIN — ENOXAPARIN SODIUM 40 MG: 100 INJECTION SUBCUTANEOUS at 08:52

## 2022-05-08 RX ADMIN — ROPINIROLE HYDROCHLORIDE 0.5 MG: 0.5 TABLET, FILM COATED ORAL at 04:58

## 2022-05-08 RX ADMIN — IPRATROPIUM BROMIDE AND ALBUTEROL SULFATE 3 ML: .5; 3 SOLUTION RESPIRATORY (INHALATION) at 15:44

## 2022-05-08 RX ADMIN — GABAPENTIN 100 MG: 100 CAPSULE ORAL at 20:55

## 2022-05-08 RX ADMIN — IPRATROPIUM BROMIDE AND ALBUTEROL SULFATE 3 ML: .5; 3 SOLUTION RESPIRATORY (INHALATION) at 07:31

## 2022-05-08 RX ADMIN — BUDESONIDE 0.5 MG: 0.5 INHALANT ORAL at 20:22

## 2022-05-08 RX ADMIN — METHYLPREDNISOLONE SODIUM SUCCINATE 40 MG: 40 INJECTION, POWDER, FOR SOLUTION INTRAMUSCULAR; INTRAVENOUS at 08:52

## 2022-05-08 RX ADMIN — POLYETHYLENE GLYCOL 3350 17 G: 17 POWDER, FOR SOLUTION ORAL at 08:51

## 2022-05-08 RX ADMIN — FUROSEMIDE 40 MG: 40 TABLET ORAL at 08:51

## 2022-05-08 RX ADMIN — TIZANIDINE 6 MG: 4 TABLET ORAL at 00:48

## 2022-05-08 RX ADMIN — IPRATROPIUM BROMIDE AND ALBUTEROL SULFATE 3 ML: .5; 3 SOLUTION RESPIRATORY (INHALATION) at 11:08

## 2022-05-08 NOTE — ED PROVIDER NOTES
MD ATTESTATION NOTE  I wore full protective equipment throughout this patient encounter including a N95 face mask, googles, gown and gloves. Hand hygiene was performed before donning protective equipment and after removal when leaving the room.    The GUILHERME and I have discussed this patient's history, physical exam, and treatment plan. I have reviewed the documentation and personally had a face to face interaction with the patient. I affirm the GUILHERME documentation and agree with their diagnostics, findings, treatment, plan, and disposition.    I provided a substantive portion of the care of this patient.  I personally performed the physical exam, in its entirety.  The attached note describes my personal findings.    Tony Leonard is a 81 y.o. male who presents to the ED c/o shortness of breath.  Patient reports history of COPD, not on oxygen at home.  Patient reports that he was at baseline health until he became short of breath several hours ago.  Patient reports he has been wheezing, developed a cough, cough is productive of mucus.  Patient denies any chest pain, no vomiting or diarrhea, no fever shakes chills or night sweats.  Patient has been vaccinated against COVID-19, denies any recent sick contacts.    On exam:  General: NAD.  Head: NCAT.  ENT: nares patent, no scleral icterus  Neck: Supple, trachea midline.  Cardiac: regular rate and rhythm.  No JVD, no peripheral edema.  Lungs: Mild respiratory distress, tachypneic, using accessory muscles, not tripoding, extremely diminished with expiratory wheezing  Abdomen: Soft, NTTP.   Extremities: Moves all extremities well, no peripheral edema  Neuro: alert, MAEW, follows commands  Psych: calm, cooperative  Skin: Warm, dry.    Medical Decision Making:  After the initial H&P, I discussed pertinent information from history and physical exam with patient/family.  Discussed differential diagnosis.  Discussed plan for ED evaluation/work-up/treatment.  All questions  answered.  Patient/family is agreeable with plan.    ED Course as of 05/08/22 1729   Sat May 07, 2022   2012 My differential diagnosis for dyspnea includes but is not limited to:  Asthma, COPD, pneumonia, pulmonary embolus, acute respiratory distress syndrome, pneumothorax, pleural effusion, pulmonary fibrosis, congestive heart failure, myocardial infarction, DKA, uremia, acidosis, sepsis, anemia, drug related, hyperventilation, CNS disease     [JG]   2013 EKG independently viewed and contemporaneously interpreted by ED physician. Time: 2002.  Rate 79.  Interpretation: Normal sinus rhythm, normal axis, normal QRS, no acute ST changes. [JG]   2023 Viewed the patient's chest x-ray.  There is some chronic changes noted.  It does appear improved from April 29, 2022. [RC]   2100 Radiologist confirms no acute process in the chest. [RC]   2137 Reassessed patient post steroids and 3 neb treatments.  He still satting at 90 to 93% at rest and breath sounds are still slightly diminished and are still significant crackles.  It is felt the patient would be better served by admission to the hospital for further treatment.  We will place a call the hospitalist [RC]   2139 . [RC]   2141 WBC: 5.68 [RC]   2141 RBC: 4.19 [RC]   2141 Hemoglobin: 13.3 [RC]   2141 Hematocrit: 41.8 [RC]   2141 Platelets: 228 [RC]   2141 proBNP: 232.0 [RC]   2141 Troponin T: <0.010 [RC]   2141 Magnesium: 2.4 [RC]   2142 Procalcitonin: 0.09 [RC]   2142 Glucose: 95 [RC]   2142 BUN: 23 [RC]   2142 Creatinine: 0.97 [RC]   2142 Sodium: 142 [RC]   2142 Potassium: 4.5 [RC]   2142 CO2: 23.9 [RC]   2142 Anion Gap: 11.1 [RC]   2145 Discussed patient's case with Dr. Moses.  To place the patient in a telemetry/observation status at this time under his care [RC]   2211 Patient ambulated SP nebs and Solu-Medrol.  SPO2 fell to 87%. [RC]      ED Course User Index  [JG] Gregory Martin MD  [RC] Erlin Hu III, PA       Diagnosis  Final diagnoses:   COPD  exacerbation (HCC)   Shortness of breath   Hypoxia        Gregory Martin MD  05/08/22 4141

## 2022-05-08 NOTE — PLAN OF CARE
Goal Outcome Evaluation:  Plan of Care Reviewed With: patient        Progress: improving  Outcome Evaluation: VSS, afebrile. No complaints, remains in bed to use urinal. Uneventful shift. Will continue to monitor.

## 2022-05-08 NOTE — THERAPY EVALUATION
Patient Name: Tony Leonard  : 1940    MRN: [id number]                              Today's Date: 2022       Admit Date: 2022    Visit Dx:     ICD-10-CM ICD-9-CM   1. COPD exacerbation ([facility])  J44.1 491.21   2. Shortness of breath  R06.02 786.05   3. Hypoxia  R09.02 799.02     Patient Active Problem List   Diagnosis   • Thrush, oral   • ADD (attention deficit disorder)   • Hemorrhoids   • Bronchiectasis with acute lower respiratory infection ([facility])   • Diverticul disease small and large intestine, no perforati or abscess   • Benign non-nodular prostatic hyperplasia without lower urinary tract symptoms   • Gastroesophageal reflux disease   • Malaise and fatigue   • Environmental allergies   • Hemoptysis   • Dyslipidemia   • Primary osteoarthritis involving multiple joints   • Pneumonia of right lower lobe due to infectious organism   • Abnormal EKG   • COPD (chronic obstructive pulmonary disease) ([facility])   • Mild malnutrition ([facility])   • Acute on chronic respiratory failure with hypoxia ([facility])   • Fracture, intertrochanteric, right femur, closed, initial encounter ([facility])   • Hematoma of frontal scalp   • Postoperative anemia due to acute blood loss   • Urinary retention   • Hemorrhagic disorder due to circulating anticoagulants ([facility])   • History of fracture of right hip   • Closed fracture of right hip with routine healing   • Chronic low back pain with sciatica   • Change in bowel function   • Rectal bleeding   • Prostate cancer ([facility])   • On home oxygen therapy   • Acute viral bronchitis   • Acute on chronic systolic (congestive) heart failure ([facility])   • Peripheral neuropathy   • Plantar fasciitis   • COPD exacerbation ([facility])   • Hematuria   • Bilateral lower extremity edema   • Microscopic hematuria     Past Medical History:   Diagnosis Date   • ADHD (attention deficit hyperactivity disorder)    • Bronchiectasis ([facility])    • Colon polyp    • COPD (chronic obstructive pulmonary disease) ([facility])    •  Diverticulosis    • Hard of hearing    • On home oxygen therapy     2 LITERS   • Pneumonia    • Prostate cancer ([facility]) 4/22/2019     Past Surgical History:   Procedure Laterality Date   • BRONCHOSCOPY N/A 4/30/2016    Procedure: BRONCHOSCOPY with BAL of right lower lobe and left  lower lobe.  ;  Surgeon: Nando Diaz MD;  Location: [facility] ENDOSCOPY;  Service:    • BRONCHOSCOPY N/A 4/28/2017    Procedure: BRONCHOSCOPY;  Surgeon: Katharina Salter MD;  Location: [facility] ENDOSCOPY;  Service:    • BRONCHOSCOPY Bilateral 6/29/2017    Procedure: BRONCHOSCOPY WITH WASHINGS;  Surgeon: Katharina Salter MD;  [facility];  Service:    • BRONCHOSCOPY N/A 1/22/2018    Procedure: BRONCHOSCOPY AT BEDSIDE with BAL;  Surgeon: Katharina Salter MD;  Location: [facility] ENDOSCOPY;  Service:    • BRONCHOSCOPY N/A 1/30/2018    Procedure: BRONCHOSCOPY with BAL and washing;  Surgeon: Shreyas Wild MD;  Location: [facility] ENDOSCOPY;  Service:    • BRONCHOSCOPY Bilateral 4/24/2018    Procedure: BRONCHOSCOPY WITH WASHING;  Surgeon: Katharina Salter MD;  Location: [facility] ENDOSCOPY;  Service: Pulmonary   • BRONCHOSCOPY N/A 12/28/2019    Procedure: BRONCHOSCOPY with bilateral washing;  Surgeon: Katharina Salter MD;  Location: [facility] ENDOSCOPY;  Service: Pulmonary   • COLONOSCOPY  05/17/2013    eh, ih, tort, sig tics   • COLONOSCOPY N/A 5/10/2019    Non-thrombosed external hemorrhoids found on perianal exam, Diverticulosis, Tortuous colon, One 5 mm polyp in the mid ascending colon, IH. Path: Tubular adenoma.    • ENDOSCOPY  03/19/2015    z line irreg, hh   • HIP OPEN REDUCTION Right 1/17/2018    Procedure: HIP OPEN REDUCTION INTERNAL FIXATION WITH DYNAMIC HIP SCREW;  Surgeon: Les Black MD;  Location: [facility] MAIN OR;  Service:    • SPINE SURGERY     • TONSILLECTOMY     • TOTAL HIP ARTHROPLASTY REVISION Right 9/23/2019    Procedure: HIP  REVISION RIGHT;  Surgeon: Isauro Warner MD;  Location: [facility] MAIN OR;  Service: Orthopedics       General Information     Row Name 05/08/22 1144          Physical Therapy Time and Intention    Document Type evaluation  Pt. admitted with COPD Exacerbation  -MS     Mode of Treatment physical therapy;individual therapy  -MS     Row Name 05/08/22 1144          General Information    Patient Profile Reviewed yes  -MS     Prior Level of Function independent:  Uses a Rollator for ambulation just prior to admission  -MS     Existing Precautions/Restrictions fall  Exit alarm  -MS     Barriers to Rehab none identified  -MS     Row Name 05/08/22 1144          Cognition    Orientation Status (Cognition) oriented x 3  -MS     Row Name 05/08/22 1144          Safety Issues, Functional Mobility    Comment, Safety Issues/Impairments (Mobility) Gait belt used for safety.  -MS           User Key  (r) = Recorded By, (t) = Taken By, (c) = Cosigned By    Initials Name Provider Type    Juan Hernández, PT Physical Therapist               Mobility     Row Name 05/08/22 1145          Bed Mobility    Bed Mobility supine-sit;sit-supine  -MS     Supine-Sit [place] (Bed Mobility) contact guard  -MS     Sit-Supine [place] (Bed Mobility) contact guard  -MS     Row Name 05/08/22 1145          Sit-Stand Transfer    Sit-Stand [place] (Transfers) contact guard  -MS     Assistive Device (Sit-Stand Transfers) walker, 4-wheeled  -MS     Row Name 05/08/22 1145          Gait/Stairs (Locomotion)    [place] Level (Gait) minimum assist (75% patient effort)  -MS     Assistive Device (Gait) walker, 4-wheeled  -MS     Distance in Feet (Gait) 150 feet  -MS     Deviations/Abnormal Patterns (Gait) norm decreased  -MS     Bilateral Gait Deviations forward flexed posture  -MS     Comment, (Gait/Stairs) Bilateral knee flexion throughout gait cycle.  x 1 standing rest break due to fatigue and pain.  -MS           User Key  (r) = Recorded By, (t) = Taken By, (c) = Cosigned By    Initials Name Provider Type    Juan Hernández, PT  Physical Therapist               Obj/Interventions     Row Name 05/08/22 1145          Range of Motion Comprehensive    Comment, General Range of Motion BUE/LE (WFL's)  -MS     Row Name 05/08/22 1145          Strength Comprehensive (MMT)    Comment, General Manual Muscle Testing (MMT) Assessment BUE/LE (3+/5)  -MS           User Key  (r) = Recorded By, (t) = Taken By, (c) = Cosigned By    Initials Name Provider Type    Juan Hernández, PT Physical Therapist               Goals/Plan     Row Name 05/08/22 1146          Bed Mobility Goal 1 (PT)    Activity/Assistive Device (Bed Mobility Goal 1, PT) bed mobility activities, all  -MS     [place] Level/Cues Needed (Bed Mobility Goal 1, PT) independent  -MS     Time Frame (Bed Mobility Goal 1, PT) long term goal (LTG);1 week  -MS     Row Name 05/08/22 1146          Transfer Goal 1 (PT)    Activity/Assistive Device (Transfer Goal 1, PT) transfers, all;walker, rolling  -MS     [place] Level/Cues Needed (Transfer Goal 1, PT) independent  -MS     Time Frame (Transfer Goal 1, PT) long term goal (LTG);1 week  -MS     Row Name 05/08/22 1146          Gait Training Goal 1 (PT)    Activity/Assistive Device (Gait Training Goal 1, PT) gait (walking locomotion);walker, rolling  -MS     [place] Level (Gait Training Goal 1, PT) independent  -MS     Distance (Gait Training Goal 1, PT) 200 feet  -MS     Time Frame (Gait Training Goal 1, PT) long term goal (LTG);1 week  -MS     Row Name 05/08/22 1146          Therapy Assessment/Plan (PT)    Planned Therapy Interventions (PT) balance training;bed mobility training;gait training;home exercise program;patient/family education;postural re-education;transfer training;strengthening  -MS           User Key  (r) = Recorded By, (t) = Taken By, (c) = Cosigned By    Initials Name Provider Type    Juan Hernández, PT Physical Therapist               Clinical Impression     Row Name 05/08/22 1146          Pain    Pretreatment  "Pain Rating 3/10  -MS     Posttreatment Pain Rating 3/10  -MS     Pain Location - Side/Orientation Bilateral  -MS     Pain Location - foot  -MS     Pre/Posttreatment Pain Comment Pt. reports \"bone spurs\" on his feet.  -MS     Row Name 05/08/22 1146          Plan of Care Review    Plan of Care Reviewed With patient  -MS     Row Name 05/08/22 1146          Therapy Assessment/Plan (PT)    Rehab Potential (PT) good, to achieve stated therapy goals  -MS     Criteria for Skilled Interventions Met (PT) skilled treatment is necessary  -MS     Therapy Frequency (PT) 6 times/wk  -MS     Row Name 05/08/22 1146          Positioning and Restraints    Pre-Treatment Position in bed  -MS     Post Treatment Position bed  -MS     In Bed notified nsg;supine;call light within reach;encouraged to call for assist;exit alarm on  All lines intact. V.S.S.  -MS           User Key  (r) = Recorded By, (t) = Taken By, (c) = Cosigned By    Initials Name Provider Type    Juan Hernández, PT Physical Therapist               Outcome Measures     Row Name 05/08/22 1147          How much help from another person do you currently need...    Turning from your back to your side while in flat bed without using bedrails? 3  -MS     Moving from lying on back to sitting on the side of a flat bed without bedrails? 3  -MS     Moving to and from a bed to a chair (including a wheelchair)? 3  -MS     Standing up from a chair using your arms (e.g., wheelchair, bedside chair)? 3  -MS     Climbing 3-5 steps with a railing? 3  -MS     To walk in hospital room? 3  -MS     AM-PAC 6 Clicks Score (PT) 18  -MS     Highest level of mobility 6 --> Walked 10 steps or more  -MS     Row Name 05/08/22 1147          Functional Assessment    Outcome Measure Options AM-PAC 6 Clicks Basic Mobility (PT)  -MS           User Key  (r) = Recorded By, (t) = Taken By, (c) = Cosigned By    Initials Name Provider Type    Juan Hernández, PT Physical Therapist                     "         Physical Therapy Education                 Title: PT OT SLP Therapies (Done)     Topic: Physical Therapy (Done)     Point: Mobility training (Done)     Learning Progress Summary           Patient Acceptance, E,D, VU,NR by MS at 5/8/2022 1147                   Point: Home exercise program (Done)     Learning Progress Summary           Patient Acceptance, E,D, VU,NR by MS at 5/8/2022 1147                   Point: Body mechanics (Done)     Learning Progress Summary           Patient Acceptance, E,D, VU,NR by MS at 5/8/2022 1147                   Point: Precautions (Done)     Learning Progress Summary           Patient Acceptance, E,D, VU,NR by MS at 5/8/2022 1147                               User Key     Initials Effective Dates Name Provider Type Discipline    MS 06/16/21 -  Juan Hauser, PT Physical Therapist PT              PT Recommendation and Plan  Planned Therapy Interventions (PT): balance training, bed mobility training, gait training, home exercise program, patient/family education, postural re-education, transfer training, strengthening  Plan of Care Reviewed With: patient  Outcome Evaluation: Pt. is an 81 year old Male admitted to the hospital with a COPD exacerbation.  Pt. reports that prior to admission he was independent with functional mobility and used a Rwx for ambulation.  Pt. currently presents with decreased strength, decreased balance, and decreased tolerance to functional activity.  This AM, pt. able to ambulate 150 feet, Min. assist x 1, with use of Rwx. Pt. requires CGA x 1 for bed mobility and CGA x 1 for sit <-> stand transfers. Pt. will benefit from skilled inpt. P.T. to address his functional deficits and to assist pt. in regaining his maximum level of independence with functional mobility.     Time Calculation:    PT Charges     Row Name 05/08/22 1150             Time Calculation    Start Time 0928  -MS      Stop Time 0944  -MS      Time Calculation (min) 16 min  -MS      PT  Received On 05/08/22  -MS      PT - Next Appointment 05/09/22  -MS      PT Goal Re-Cert Due Date 05/15/22  -MS              Time Calculation- PT    Total Timed Code Minutes- PT 15 minute(s)  -MS            User Key  (r) = Recorded By, (t) = Taken By, (c) = Cosigned By    Initials Name Provider Type    Juan Hernández, PT Physical Therapist              Therapy Charges for Today     Code Description Service Date Service Provider Modifiers Qty    44007348442 HC PT EVAL MOD COMPLEXITY 2 5/8/2022 Juan Hauser, PT GP 1    87734493831 HC PT THERAPEUTIC ACT EA 15 MIN 5/8/2022 Juan Hauser, PT GP 1          PT G-Codes  Outcome Measure Options: AM-PAC 6 Clicks Basic Mobility (PT)  AM-PAC 6 Clicks Score (PT): 18    Juan Hauser, PT  5/8/2022

## 2022-05-08 NOTE — PLAN OF CARE
Goal Outcome Evaluation:  Plan of Care Reviewed With: patient           Outcome Evaluation: Pt. is an 81 year old Male admitted to the hospital with a COPD exacerbation.  Pt. reports that prior to admission he was independent with functional mobility and used a Rwx for ambulation.  Pt. currently presents with decreased strength, decreased balance, and decreased tolerance to functional activity.  This AM, pt. able to ambulate 150 feet, Min. assist x 1, with use of Rwx. Pt. requires CGA x 1 for bed mobility and CGA x 1 for sit <-> stand transfers. Pt. will benefit from skilled inpt. P.T. to address his functional deficits and to assist pt. in regaining his maximum level of independence with functional mobility.    Patient was wearing a face mask during this therapy encounter. Therapist used appropriate personal protective equipment including eye protection, mask, and gloves.  Mask used was standard procedure mask. Appropriate PPE was worn during the entire therapy session. Hand hygiene was completed before and after therapy session. Patient is not in enhanced droplet precautions.

## 2022-05-08 NOTE — ED PROVIDER NOTES
EMERGENCY DEPARTMENT ENCOUNTER    Room Number:  E462/1  Date of encounter:  5/8/2022  PCP: Erich Aviles MD  Historian: Patient      HPI:  Chief Complaint: Shortness of breath  A complete HPI/ROS/PMH/PSH/SH/FH are unobtainable due to: Nothing    Context: Tony Leonard is a 81 y.o. male who presents to the ED c/o shortness of breath that began approximately 3 and half hours ago.  Patient states he got out and went to a different neighborhood with friends to watch the therapy.  Shortly thereafter he began to feel extremely short of breath.  He states he has had significant coughing and cannot seem to catch his breath.  He informs me he does take nebulizer treatments at home and is followed by Dr. Salter with [facility].  At present he is denying fever, chills, recent sick contacts, chest pain, palpitations abdominal pain, nausea, vomiting associated with the shortness of breath.  He is here for further evaluation.      Reviewing the patient's chart he has a past medical history of bronchiectases, COPD, pneumonia, CHF, hypertension, hyperlipidemia, prostate cancer, anemia.  Patient was seen here on 4/26/2022 and diagnosed with dehydration, hypocalcemia, lethargic and discharged home with conservative measures.      PAST MEDICAL HISTORY  Active Ambulatory Problems     Diagnosis Date Noted   • Thrush, oral 03/11/2016   • ADD (attention deficit disorder) 03/11/2016   • Hemorrhoids 03/11/2016   • Bronchiectasis with acute lower respiratory infection (HCC) 03/11/2016   • Diverticul disease small and large intestine, no perforati or abscess 03/11/2016   • Benign non-nodular prostatic hyperplasia without lower urinary tract symptoms 03/11/2016   • Gastroesophageal reflux disease 03/11/2016   • Malaise and fatigue 03/11/2016   • Environmental allergies 04/25/2016   • Hemoptysis 04/27/2016   • Dyslipidemia 05/11/2016   • Primary osteoarthritis involving multiple joints 05/11/2016   • Pneumonia of right lower  lobe due to infectious organism 10/03/2016   • Abnormal EKG 10/04/2016   • COPD (chronic obstructive pulmonary disease) ([facility]) 10/04/2016   • Mild malnutrition ([facility]) 03/28/2017   • Acute on chronic respiratory failure with hypoxia ([facility]) 04/27/2017   • Fracture, intertrochanteric, right femur, closed, initial encounter ([facility]) 01/16/2018   • Hematoma of frontal scalp 01/16/2018   • Postoperative anemia due to acute blood loss 01/19/2018   • Urinary retention 01/25/2018   • Hemorrhagic disorder due to circulating anticoagulants ([facility]) 01/29/2018   • History of fracture of right hip 02/22/2018   • Closed fracture of right hip with routine healing 02/28/2018   • Chronic low back pain with sciatica 06/21/2018   • Change in bowel function 04/18/2019   • Rectal bleeding 04/18/2019   • Prostate cancer ([facility]) 04/22/2019   • On home oxygen therapy 09/24/2019   • Acute viral bronchitis 12/29/2019   • Acute on chronic systolic (congestive) heart failure ([facility]) 10/14/2020   • Peripheral neuropathy 07/01/2021   • Plantar fasciitis 09/08/2021     Resolved Ambulatory Problems     Diagnosis Date Noted   • Pneumonia of both lower lobes due to infectious organism 03/11/2016   • Pneumonia of right upper lobe due to infectious organism 04/22/2016   • COPD exacerbation ([facility]) 04/25/2016   • GERD (gastroesophageal reflux disease) 04/25/2016   • Chronic respiratory failure with hypoxia ([facility]) 10/04/2016   • Bacterial lobar pneumonia 12/27/2016   • Pneumonia of left lower lobe due to infectious organism 03/26/2017   • Bronchitis 06/01/2017   • COPD exacerbation ([facility]) 06/17/2017   • Chronic diastolic CHF (congestive heart failure) ([facility]) 01/16/2018   • Leukocytosis 01/16/2018   • Right upper lobe pneumonia 01/20/2018   • Mucus plugging of bronchi 01/16/2018   • COPD exacerbation ([facility]) 04/16/2018   • Degenerative joint disease (DJD) of hip 09/23/2019   • Bronchiectasis with (acute) exacerbation ([facility]) 12/28/2019   • Primary hypertension 05/06/2022      Past Medical History:   Diagnosis Date   • ADHD (attention deficit hyperactivity disorder)    • Bronchiectasis (HCC)    • Colon polyp    • Diverticulosis    • Hard of hearing    • Pneumonia          PAST SURGICAL HISTORY  Past Surgical History:   Procedure Laterality Date   • BRONCHOSCOPY N/A 4/30/2016    Procedure: BRONCHOSCOPY with BAL of right lower lobe and left  lower lobe.  ;  Surgeon: Nando Diaz MD;  Location: [facility] ENDOSCOPY;  Service:    • BRONCHOSCOPY N/A 4/28/2017    Procedure: BRONCHOSCOPY;  Surgeon: Katharina Salter MD;  Location: [facility] ENDOSCOPY;  Service:    • BRONCHOSCOPY Bilateral 6/29/2017    Procedure: BRONCHOSCOPY WITH WASHINGS;  Surgeon: Katharina Salter MD;  Location: [facility] ENDOSCOPY;  Service:    • BRONCHOSCOPY N/A 1/22/2018    Procedure: BRONCHOSCOPY AT BEDSIDE with BAL;  Surgeon: Katharina Salter MD;  Location: [facility] ENDOSCOPY;  Service:    • BRONCHOSCOPY N/A 1/30/2018    Procedure: BRONCHOSCOPY with BAL and washing;  Surgeon: Shreyas Wild MD;  Location: [facility] ENDOSCOPY;  Service:    • BRONCHOSCOPY Bilateral 4/24/2018    Procedure: BRONCHOSCOPY WITH WASHING;  Surgeon: Katharina Salter MD;  Location: [facility] ENDOSCOPY;  Service: Pulmonary   • BRONCHOSCOPY N/A 12/28/2019    Procedure: BRONCHOSCOPY with bilateral washing;  Surgeon: Katharina Salter MD;  Location: [facility] ENDOSCOPY;  Service: Pulmonary   • COLONOSCOPY  05/17/2013    eh, ih, tort, sig tics   • COLONOSCOPY N/A 5/10/2019    Non-thrombosed external hemorrhoids found on perianal exam, Diverticulosis, Tortuous colon, One 5 mm polyp in the mid ascending colon, IH. Path: Tubular adenoma.    • ENDOSCOPY  03/19/2015    z line dequan menendez   • HIP OPEN REDUCTION Right 1/17/2018    Procedure: HIP OPEN REDUCTION INTERNAL FIXATION WITH DYNAMIC HIP SCREW;  Surgeon: Les Black MD;  Location: [facility] OR;  Service:    • SPINE SURGERY     • TONSILLECTOMY     • TOTAL HIP ARTHROPLASTY REVISION Right 9/23/2019    Procedure: HIP  REVISION  RIGHT;  Surgeon: Isauro Warner MD;  Location: [facility] OR;  Service: Orthopedics         FAMILY HISTORY  Family History   Problem Relation Age of Onset   • Thyroid disease Mother    • No Known Problems Father    • Malig Hyperthermia Neg Hx          SOCIAL HISTORY  Social History     Socioeconomic History   • Marital status: Single   Tobacco Use   • Smoking status: Never Smoker   • Smokeless tobacco: Never Used   Substance and Sexual Activity   • Alcohol use: No     Comment: red wine occassional   • Drug use: No   • Sexual activity: Defer         ALLERGIES  Daliresp [roflumilast], Latex, and Sulfa antibiotics        REVIEW OF SYSTEMS  Review of Systems   Constitutional: Negative.    HENT: Negative.    Respiratory: Positive for cough and shortness of breath.    Cardiovascular: Negative for chest pain.   Gastrointestinal: Negative.    Genitourinary: Negative.    Musculoskeletal: Negative.    Skin: Negative.    Neurological: Negative.    Psychiatric/Behavioral: Negative.         All systems reviewed and negative except for those discussed in HPI.       PHYSICAL EXAM    I have reviewed the triage vital signs and nursing notes.    ED Triage Vitals [05/07/22 1953]   Temp Heart Rate Resp BP SpO2   98.2 °F (36.8 °C) 89 25 147/72 93 %      Temp src Heart Rate Source Patient Position BP Location FiO2 (%)   Tympanic -- Sitting Left arm --       Physical Exam  GENERAL: Frail in appearance, nontoxic  HENT: nares patent  EYES: no scleral icterus  CV: regular rhythm, regular rate  RESPIRATORY: Diffuse crackles and wheezes, decreased breath sounds, slightly tachypneic  ABDOMEN: soft, nontender  MUSCULOSKELETAL: no deformity  NEURO: alert, moves all extremities, follows commands  SKIN: warm, dry        LAB RESULTS  Recent Results (from the past 24 hour(s))   ECG 12 Lead    Collection Time: 05/07/22  8:02 PM   Result Value Ref Range    QT Interval 394 ms   Comprehensive Metabolic Panel    Collection Time: 05/07/22  8:42 PM     Specimen: Blood   Result Value Ref Range    Glucose 95 65 - 99 mg/dL    BUN 23 8 - 23 mg/dL    Creatinine 0.97 0.76 - 1.27 mg/dL    Sodium 142 136 - 145 mmol/L    Potassium 4.5 3.5 - 5.2 mmol/L    Chloride 107 98 - 107 mmol/L    CO2 23.9 22.0 - 29.0 mmol/L    Calcium 9.5 8.6 - 10.5 mg/dL    Total Protein 7.1 6.0 - 8.5 g/dL    Albumin 4.20 3.50 - 5.20 g/dL    ALT (SGPT) 12 1 - 41 U/L    AST (SGOT) 15 1 - 40 U/L    Alkaline Phosphatase 101 39 - 117 U/L    Total Bilirubin 0.3 0.0 - 1.2 mg/dL    Globulin 2.9 gm/dL    A/G Ratio 1.4 g/dL    BUN/Creatinine Ratio 23.7 7.0 - 25.0    Anion Gap 11.1 5.0 - 15.0 mmol/L    eGFR 77.5 >60.0 mL/min/1.73   Troponin    Collection Time: 05/07/22  8:42 PM    Specimen: Blood   Result Value Ref Range    Troponin T <0.010 0.000 - 0.030 ng/mL   BNP    Collection Time: 05/07/22  8:42 PM    Specimen: Blood   Result Value Ref Range    proBNP 232.0 0.0 - 1,800.0 pg/mL   Magnesium    Collection Time: 05/07/22  8:42 PM    Specimen: Blood   Result Value Ref Range    Magnesium 2.4 1.6 - 2.4 mg/dL   Procalcitonin    Collection Time: 05/07/22  8:42 PM    Specimen: Blood   Result Value Ref Range    Procalcitonin 0.09 0.00 - 0.25 ng/mL   CBC Auto Differential    Collection Time: 05/07/22  8:42 PM    Specimen: Blood   Result Value Ref Range    WBC 5.68 3.40 - 10.80 10*3/mm3    RBC 4.19 4.14 - 5.80 10*6/mm3    Hemoglobin 13.3 13.0 - 17.7 g/dL    Hematocrit 41.8 37.5 - 51.0 %    MCV 99.8 (H) 79.0 - 97.0 fL    MCH 31.7 26.6 - 33.0 pg    MCHC 31.8 31.5 - 35.7 g/dL    RDW 13.0 12.3 - 15.4 %    RDW-SD 48.3 37.0 - 54.0 fl    MPV 9.3 6.0 - 12.0 fL    Platelets 228 140 - 450 10*3/mm3    Neutrophil % 44.5 42.7 - 76.0 %    Lymphocyte % 32.9 19.6 - 45.3 %    Monocyte % 9.3 5.0 - 12.0 %    Eosinophil % 12.0 (H) 0.3 - 6.2 %    Basophil % 0.9 0.0 - 1.5 %    Immature Grans % 0.4 0.0 - 0.5 %    Neutrophils, Absolute 2.53 1.70 - 7.00 10*3/mm3    Lymphocytes, Absolute 1.87 0.70 - 3.10 10*3/mm3    Monocytes, Absolute 0.53  0.10 - 0.90 10*3/mm3    Eosinophils, Absolute 0.68 (H) 0.00 - 0.40 10*3/mm3    Basophils, Absolute 0.05 0.00 - 0.20 10*3/mm3    Immature Grans, Absolute 0.02 0.00 - 0.05 10*3/mm3    nRBC 0.0 0.0 - 0.2 /100 WBC   Respiratory Panel PCR w/COVID-19(SARS-CoV-2) JARRETT/[facility]PRAO/ROYAL/PAD/[facility]MAIN In-House, NP Swab in UTM/VTM, 3-4 HR TAT - Swab, Nasopharynx    Collection Time: 05/07/22  8:49 PM    Specimen: Nasopharynx; Swab   Result Value Ref Range    ADENOVIRUS, PCR Not Detected Not Detected    Coronavirus 229E Not Detected Not Detected    Coronavirus HKU1 Not Detected Not Detected    Coronavirus NL63 Not Detected Not Detected    Coronavirus OC43 Not Detected Not Detected    COVID19 Not Detected Not Detected - Ref. Range    Human Metapneumovirus Not Detected Not Detected    Human Rhinovirus/Enterovirus Not Detected Not Detected    Influenza A PCR Not Detected Not Detected    Influenza B PCR Not Detected Not Detected    Parainfluenza Virus 1 Not Detected Not Detected    Parainfluenza Virus 2 Not Detected Not Detected    Parainfluenza Virus 3 Not Detected Not Detected    Parainfluenza Virus 4 Not Detected Not Detected    RSV, PCR Not Detected Not Detected    Bordetella pertussis pcr Not Detected Not Detected    Bordetella parapertussis PCR Not Detected Not Detected    Chlamydophila pneumoniae PCR Not Detected Not Detected    Mycoplasma pneumo by PCR Not Detected Not Detected       Ordered the above labs and independently reviewed the results.        RADIOLOGY  XR Chest 1 View    Result Date: 5/7/2022  PORTABLE CHEST 05/07/2022 AT 8:12 PM  CLINICAL HISTORY: Dyspnea  Compared to the previous chest dated 04/26/2022.  The lungs are fairly well-expanded and appear free of infiltrates or masses. There are no pleural effusions. The cardiomediastinal silhouette is unremarkable.  IMPRESSIONS: No evidence of active disease within the chest.  This report was finalized on 5/7/2022 8:41 PM by Dr. Jered Santizo M.D.        I ordered the above  noted radiological studies. Reviewed by me and discussed with radiologist.  See dictation for official radiology interpretation.      PROCEDURES    Procedures      MEDICATIONS GIVEN IN ER    Medications   albuterol (PROVENTIL) nebulizer solution 0.083% 2.5 mg/3mL (2.5 mg Nebulization Given 5/7/22 2153)   ipratropium-albuterol (DUO-NEB) nebulizer solution 3 mL (3 mL Nebulization Given 5/7/22 2119)   methylPREDNISolone sodium succinate (SOLU-Medrol) injection 125 mg (125 mg Intravenous Given 5/7/22 2043)   magnesium sulfate 2g/50 mL (PREMIX) infusion (0 g Intravenous Stopped 5/7/22 2253)         PROGRESS, DATA ANALYSIS, CONSULTS, AND MEDICAL DECISION MAKING    All labs have been independently reviewed by me.  All radiology studies have been reviewed by me and discussed with radiologist dictating the report.   EKG's independently viewed and interpreted by me.  Discussion below represents my analysis of pertinent findings related to patient's condition, differential diagnosis, treatment plan and final disposition.    DDx includes but is not limited to: Asthma, COPD, pneumonia, PE, acute respiratory distress syndrome, PTX, CHF, MI, DKA, other acidosis.      ED Course as of 05/08/22 0653   Sat May 07, 2022   2012 My differential diagnosis for dyspnea includes but is not limited to:  Asthma, COPD, pneumonia, pulmonary embolus, acute respiratory distress syndrome, pneumothorax, pleural effusion, pulmonary fibrosis, congestive heart failure, myocardial infarction, DKA, uremia, acidosis, sepsis, anemia, drug related, hyperventilation, CNS disease     [JG]   2013 EKG independently viewed and contemporaneously interpreted by ED physician. Time: 2002.  Rate 79.  Interpretation: Normal sinus rhythm, normal axis, normal QRS, no acute ST changes. [JG]   2023 Viewed the patient's chest x-ray.  There is some chronic changes noted.  It does appear improved from April 29, 2022. [RC]   2100 Radiologist confirms no acute process in the  chest. [RC]   2137 Reassessed patient post steroids and 3 neb treatments.  He still satting at 90 to 93% at rest and breath sounds are still slightly diminished and are still significant crackles.  It is felt the patient would be better served by admission to the hospital for further treatment.  We will place a call the hospitalist [RC]   2139 . [RC]   2141 WBC: 5.68 [RC]   2141 RBC: 4.19 [RC]   2141 Hemoglobin: 13.3 [RC]   2141 Hematocrit: 41.8 [RC]   2141 Platelets: 228 [RC]   2141 proBNP: 232.0 [RC]   2141 Troponin T: <0.010 [RC]   2141 Magnesium: 2.4 [RC]   2142 Procalcitonin: 0.09 [RC]   2142 Glucose: 95 [RC]   2142 BUN: 23 [RC]   2142 Creatinine: 0.97 [RC]   2142 Sodium: 142 [RC]   2142 Potassium: 4.5 [RC]   2142 CO2: 23.9 [RC]   2142 Anion Gap: 11.1 [RC]   2145 Discussed patient's case with Dr. Moses.  To place the patient in a telemetry/observation status at this time under his care [RC]   2211 Patient ambulated SP nebs and Solu-Medrol.  SPO2 fell to 87%. [RC]      ED Course User Index  [JG] Gregory Martin MD  [RC] Erlin Hu III, PA           PPE: The patient wore a surgical mask throughout the entire patient encounter. I wore an N95.    AS OF 06:53 EDT VITALS:    BP - 151/75  HR - 83  TEMP - 97.5 °F (36.4 °C) (Oral)  O2 SATS - 95%        DIAGNOSIS  Final diagnoses:   COPD exacerbation (HCC)   Shortness of breath   Hypoxia         DISPOSITION  ADMISSION    Discussed treatment plan and reason for admission with pt/family and admitting physician.  Pt/family voiced understanding of the plan for admission for further testing/treatment as needed.              Erlin Hu III, PA  05/08/22 0656

## 2022-05-08 NOTE — PLAN OF CARE
Goal Outcome Evaluation:              Outcome Evaluation: VSS. Pt had no c/o of SOA. +ambulation. Pt impulsive when it comes to getting up and going to the restroom. Education provided about using call light. Bed alarm set. Pt switched to PO prednisone. Mucinex added. No other complaints. Will continue to monitor.

## 2022-05-08 NOTE — PROGRESS NOTES
[facility]: Enoxaparin Consult    Tony Leonard has a pharmacy consult to dose prophylactic enoxaparin per Joe KENNEY's request.     Indication: VTE prophylaxis  Home Anticoagulation:      Relevant clinical data and objective history reviewed:  81 y.o. male       There is no height or weight on file to calculate BMI.   Results from last 7 days   Lab Units 05/07/22  2042   PLATELETS 10*3/mm3 228     Estimated Creatinine Clearance: 64.5 mL/min (by C-G formula based on SCr of 0.97 mg/dL).    Assessment/Plan    Will start patient on 40mg subcutaneous every 24 hours, adjusted for renal function. Consult order will be discontinued but pharmacy will continue to follow.     Lee Silva [facility]  Clinical Pharmacist

## 2022-05-08 NOTE — PROGRESS NOTES
Name: Tony Leonard ADMIT: 2022   : 1940  PCP: Erich Aviles MD    MRN: [id number] LOS: 0 days   AGE/SEX: 81 y.o. male  ROOM: [facility]/     Subjective   Subjective   CC: cough, dyspnea  No acute events. Patient overall is feeling much better. Having a productive cough still. Taking PO. No CP/dyspnea/f/c/n/v/d. He denies gross hematuria.    Objective   Objective   Vital Signs  Temp:  [97.5 °F (36.4 °C)-98.2 °F (36.8 °C)] 97.6 °F (36.4 °C)  Heart Rate:  [70-94] 94  Resp:  [18-28] 18  BP: (119-151)/() 147/85  SpO2:  [92 %-100 %] 94 %  on   ;   Device (Oxygen Therapy): room air  Body mass index is 23.92 kg/m².  Physical Exam  Vitals and nursing note reviewed.   Constitutional:       General: He is not in acute distress.     Appearance: He is ill-appearing. He is not toxic-appearing or diaphoretic.   HENT:      Head: Normocephalic and atraumatic.      Nose: Nose normal.      Mouth/Throat:      Mouth: Mucous membranes are moist.      Pharynx: Oropharynx is clear.   Eyes:      Extraocular Movements: Extraocular movements intact.      Conjunctiva/sclera: Conjunctivae normal.      Pupils: Pupils are equal, round, and reactive to light.   Cardiovascular:      Rate and Rhythm: Normal rate and regular rhythm.      Pulses: Normal pulses.   Pulmonary:      Effort: Pulmonary effort is normal.      Breath sounds: Examination of the right-lower field reveals decreased breath sounds. Examination of the left-lower field reveals decreased breath sounds. Decreased breath sounds and rhonchi present. No wheezing.   Abdominal:      General: Bowel sounds are normal.      Palpations: Abdomen is soft.   Musculoskeletal:         General: Swelling (2+ BLE) present. No tenderness.      Cervical back: Normal range of motion and neck supple.   Skin:     General: Skin is warm and dry.      Capillary Refill: Capillary refill takes less than 2 seconds.   Neurological:      General: No focal deficit present.      Mental  Status: He is alert and oriented to person, place, and time.   Psychiatric:         Mood and Affect: Mood normal.         Behavior: Behavior normal.     Results Review     I reviewed the patient's new clinical results.  I reviewed the patient's telemetry.   I reviewed the patient's chest xray.  Results from last 7 days   Lab Units 05/07/22 2042   WBC 10*3/mm3 5.68   HEMOGLOBIN g/dL 13.3   PLATELETS 10*3/mm3 228     Results from last 7 days   Lab Units 05/07/22 2042   SODIUM mmol/L 142   POTASSIUM mmol/L 4.5   CHLORIDE mmol/L 107   CO2 mmol/L 23.9   BUN mg/dL 23   CREATININE mg/dL 0.97   GLUCOSE mg/dL 95   EGFR mL/min/1.73 77.5     Results from last 7 days   Lab Units 05/07/22 2042   ALBUMIN g/dL 4.20   BILIRUBIN mg/dL 0.3   ALK PHOS U/L 101   AST (SGOT) U/L 15   ALT (SGPT) U/L 12     Results from last 7 days   Lab Units 05/07/22 2042   CALCIUM mg/dL 9.5   ALBUMIN g/dL 4.20   MAGNESIUM mg/dL 2.4     Results from last 7 days   Lab Units 05/07/22 2042   PROCALCITONIN ng/mL 0.09     No results found for: HGBA1C, POCGLU    No radiology results for the last day  Scheduled Medications  budesonide, 0.5 mg, Nebulization, BID  enoxaparin, 40 mg, Subcutaneous, Q24H  FLUoxetine, 20 mg, Oral, Daily  furosemide, 40 mg, Oral, Daily  gabapentin, 100 mg, Oral, Nightly  ipratropium-albuterol, 3 mL, Nebulization, 4x Daily - RT  methylPREDNISolone sodium succinate, 40 mg, Intravenous, Daily  Mirabegron ER, 25 mg, Oral, Daily  pantoprazole, 40 mg, Oral, Daily  polyethylene glycol, 17 g, Oral, Daily  rOPINIRole, 0.5 mg, Oral, Nightly  tiZANidine, 6 mg, Oral, Nightly    Infusions   Diet  Diet Regular; Cardiac       Assessment/Plan     Active Hospital Problems    Diagnosis  POA   • **COPD exacerbation (HCC) [J44.1]  Yes   • Microscopic hematuria [R31.29]  Yes   • Hematuria [R31.9]  Yes   • Bilateral lower extremity edema [R60.0]  Yes   • Peripheral neuropathy [G62.9]  Yes   • Chronic low back pain with sciatica [M54.40, G89.29]  Yes       Resolved Hospital Problems   No resolved problems to display.   COPD Exacerbation  - seems to be doing better, he has been weaned to room air  - chest xray is clear and procalcitonin is low, no evidence of bacterial pneumonia; RVP is negative as well  - continue on duo-nebs, will add mucinex and flutter valve  - change IV solu-medrol to oral prednisone-he is already on a ppi    Microscopic Hematuria  - he has not had gross bleeding  - he will need to follow up with Urology-this is already scheduled    Chronic BLE Edema  - continue oral lasix    Peripheral Neuropathy  - continue gabapentin    Lovenox 40 mg SC daily for DVT prophylaxis.  Full code.  Discussed with patient and nursing staff.  Anticipate discharge home in 1-2 days.      Juan Howell MD  [facility]  05/08/22  12:01 EDT

## 2022-05-08 NOTE — H&P
Patient Name:  Tony Leonard  YOB: 1940  MRN:  [id number]  Admit Date:  5/7/2022  Patient Care Team:  Erich Aviles MD as PCP - General (Internal Medicine)  Katharina Salter MD as Consulting Physician (Pulmonary Disease)  Anum Bhatt MD as Consulting Physician (Pain Medicine)      Subjective   History Present Illness     Chief Complaint   Patient presents with   • Shortness of Breath       Mr. Leonard is a 81 y.o. male with a history of COPD, prostate cancer and BLE edema that presents to [facility] complaining of increased work of breathing.  Patient states that he went to visit a friend to watch the [place].  During his time at his friend's house he consumed 1 alcoholic beverage which caused him some mild GI upset.  He reports having several bowel movements right after the drink.  At that time he began breathing heavier and utilizing accessory muscles over a period of time.  Patient states that onset was quite rapid and progress quickly.  He asked that his friends bring him to [facility] for further evaluation.  Upon arrival patient's oxygen saturation was 93% at rest.  However, during a walking pulse oximetry test patient was able to maintain proper oxygen saturation and desatted to 88%.  Patient is likely in COPD exacerbation and is requiring breathing treatments and steroids to return to baseline.  During examination patient is alert and oriented and in good spirits.  He has no complaints at this time.  He denies any chest pain, palpitations, wheezing or choking.  However, he does confirm that he did have some lightheadedness and dizziness during this episode.  At baseline patient has generalized weakness and ambulates with a use of a walker.  He denies any recent falls or increased weakness.  Patient does have chronic back pain and occasional neck stiffness.  He is followed by pain management.  Patient's bilateral lower extremities are  edematous.  Patient is prescribed a diuretic to decrease edema.  However, at this time he states that he is unable to tolerate it due to increased urinary frequency and has not been taking it.  Patient has no other complaints at this time and feels substantially better than he did upon arrival.  Plan of care reviewed with patient he is agreeable.  Review of systems, physical exam, diagnostic data and plan as outlined below.      Review of Systems   Constitutional: Positive for activity change. Negative for appetite change and fever.   HENT: Negative for sore throat and trouble swallowing.    Respiratory: Positive for shortness of breath.    Cardiovascular: Positive for leg swelling.   Gastrointestinal: Negative for abdominal pain, constipation, diarrhea, nausea and vomiting.   Genitourinary: Positive for hematuria. Negative for difficulty urinating.   Musculoskeletal: Positive for back pain, gait problem and neck stiffness. Negative for arthralgias, myalgias and neck pain.   Neurological: Positive for dizziness, weakness and light-headedness.   Psychiatric/Behavioral: Negative for sleep disturbance. The patient is not nervous/anxious.         Personal History     Past Medical History:   Diagnosis Date   • ADHD (attention deficit hyperactivity disorder)    • Bronchiectasis (HCC)    • Colon polyp    • COPD (chronic obstructive pulmonary disease) (HCC)    • Diverticulosis    • Hard of hearing    • On home oxygen therapy     2 LITERS   • Pneumonia    • Prostate cancer (HCC) 4/22/2019     Past Surgical History:   Procedure Laterality Date   • BRONCHOSCOPY N/A 4/30/2016    Procedure: BRONCHOSCOPY with BAL of right lower lobe and left  lower lobe.  ;  Surgeon: Nando Diaz MD;  Location: [facility] ENDOSCOPY;  Service:    • BRONCHOSCOPY N/A 4/28/2017    Procedure: BRONCHOSCOPY;  Surgeon: Katharina Salter MD;  Location: [facility] ENDOSCOPY;  Service:    • BRONCHOSCOPY Bilateral 6/29/2017    Procedure: BRONCHOSCOPY WITH  WASHINGS;  Surgeon: Katharina Salter MD;  Location: [facility] ENDOSCOPY;  Service:    • BRONCHOSCOPY N/A 1/22/2018    Procedure: BRONCHOSCOPY AT BEDSIDE with BAL;  Surgeon: Katharina Salter MD;  Location: [facility] ENDOSCOPY;  Service:    • BRONCHOSCOPY N/A 1/30/2018    Procedure: BRONCHOSCOPY with BAL and washing;  Surgeon: Shreyas Wild MD;  Location: [facility] ENDOSCOPY;  Service:    • BRONCHOSCOPY Bilateral 4/24/2018    Procedure: BRONCHOSCOPY WITH WASHING;  Surgeon: Katharina Salter MD;  Location: [facility] ENDOSCOPY;  Service: Pulmonary   • BRONCHOSCOPY N/A 12/28/2019    Procedure: BRONCHOSCOPY with bilateral washing;  Surgeon: Katharina Salter MD;  Location: [facility] ENDOSCOPY;  Service: Pulmonary   • COLONOSCOPY  05/17/2013    eh, ih, tort, sig tics   • COLONOSCOPY N/A 5/10/2019    Non-thrombosed external hemorrhoids found on perianal exam, Diverticulosis, Tortuous colon, One 5 mm polyp in the mid ascending colon, IH. Path: Tubular adenoma.    • ENDOSCOPY  03/19/2015    z line irreg, hh   • HIP OPEN REDUCTION Right 1/17/2018    Procedure: HIP OPEN REDUCTION INTERNAL FIXATION WITH DYNAMIC HIP SCREW;  Surgeon: Les Black MD;  Location: [facility] OR;  Service:    • SPINE SURGERY     • TONSILLECTOMY     • TOTAL HIP ARTHROPLASTY REVISION Right 9/23/2019    Procedure: HIP  REVISION RIGHT;  Surgeon: Isauro Warner MD;  Location: [facility] OR;  Service: Orthopedics     Family History   Problem Relation Age of Onset   • Thyroid disease Mother    • No Known Problems Father    • Malig Hyperthermia Neg Hx      Social History     Tobacco Use   • Smoking status: Never Smoker   • Smokeless tobacco: Never Used   Substance Use Topics   • Alcohol use: No     Comment: red wine occassional   • Drug use: No     No current facility-administered medications on file prior to encounter.     Current Outpatient Medications on File Prior to Encounter   Medication Sig Dispense Refill   • albuterol (ACCUNEB) 1.25 MG/3ML nebulizer solution Inhale 1  ampule.     • albuterol (PROVENTIL) (2.5 MG/3ML) 0.083% nebulizer solution Take 2.5 mg by nebulization 4 (Four) Times a Day. (Patient taking differently: Take 2.5 mg by nebulization 2 (Two) Times a Day.) 360 mL 3   • Anoro Ellipta 62.5-25 MCG/INH aerosol powder  inhaler USE 1 INHALATION DAILY 180 each 3   • budesonide (PULMICORT) 0.5 MG/2ML nebulizer solution Take 2 mL by nebulization 2 (Two) Times a Day. 1 vial only twice daily 30 each 3   • calcium carbonate (TUMS) 500 MG chewable tablet Chew 1 tablet As Needed for Indigestion or Heartburn.     • colestipol (COLESTID) 5 g granules Take 5 g by mouth 2 (Two) Times a Day. Prn after diarrhea 500 g 3   • FLUoxetine (PROzac) 20 MG capsule TAKE 1 CAPSULE DAILY 90 capsule 3   • furosemide (LASIX) 40 MG tablet Take 40 mg by mouth.     • gabapentin (NEURONTIN) 100 MG capsule Take 1 capsule by mouth Every Night. 90 capsule 1   • ketoconazole (NIZORAL) 2 % shampoo APPLY TOPICALLY TO THE APPROPRIATE AREA AS DIRECTED TWO TIMES A WEEK 120 mL 3   • methylphenidate (Ritalin) 10 MG tablet Take 1 tablet by mouth Daily. For ADD 3 months 30 tablet 0   • Misc. Devices (Rollator Ultra-Light) misc 1 application Daily. Severe DJD 1 each 0   • Multiple Vitamins-Minerals (MULTIVITAMIN ADULT PO) Take 1 tablet by mouth Daily.     • Myrbetriq 25 MG tablet sustained-release 24 hour 24 hr tablet      • oxyCODONE-acetaminophen (PERCOCET) 7.5-325 MG per tablet Take 1 tablet by mouth Every 8 (Eight) Hours As Needed for Severe Pain . 30 day prescription. 60 tablet 0   • OXYGEN-HELIUM IN Inhale 2.5 L Daily As Needed.     • pantoprazole (PROTONIX) 40 MG EC tablet TAKE 1 TABLET DAILY 90 tablet 3   • polyethylene glycol 17 g packet DISSOLVE ONE PACKET IN 8OZ LIQUID & DRINK BY MOUTH DAILY 28 each 10   • potassium chloride (K-DUR,KLOR-CON) 20 MEQ CR tablet Take 1 tablet by mouth Daily.     • rOPINIRole (REQUIP) 0.5 MG tablet TAKE 1 TO 2 TABLETS EVERY NIGHT 1 HOUR BEFORE BEDTIME 90 tablet 7   • sodium  chloride 7 % nebulizer solution nebulizer solution      • terbinafine (lamiSIL) 250 MG tablet      • TiZANidine (ZANAFLEX) 6 MG capsule TAKE 1 CAPSULE EVERY NIGHT 90 capsule 3   • Turmeric 500 MG capsule Take  by mouth Daily.     • vitamin B-12 (CYANOCOBALAMIN) 2500 MCG sublingual tablet tablet Take 2,500 mcg by mouth Every Evening.       Allergies   Allergen Reactions   • Daliresp [Roflumilast] Anaphylaxis   • Latex Rash   • Sulfa Antibiotics Rash       Objective    Objective     Vital Signs  Temp:  [98.2 °F (36.8 °C)] 98.2 °F (36.8 °C)  Heart Rate:  [76-89] 78  Resp:  [24-28] 24  BP: (119-147)/() 140/79  SpO2:  [92 %-100 %] 95 %  on   ;   Device (Oxygen Therapy): room air  There is no height or weight on file to calculate BMI.    Physical Exam  Constitutional:       Appearance: Normal appearance. He is normal weight. He is ill-appearing.   HENT:      Head: Normocephalic and atraumatic.      Mouth/Throat:      Mouth: Mucous membranes are dry.      Pharynx: Oropharynx is clear.   Eyes:      General: No scleral icterus.     Extraocular Movements: Extraocular movements intact.      Conjunctiva/sclera: Conjunctivae normal.   Cardiovascular:      Rate and Rhythm: Normal rate and regular rhythm.   Pulmonary:      Effort: Pulmonary effort is normal.      Breath sounds: Normal breath sounds.   Abdominal:      General: Bowel sounds are normal.      Palpations: Abdomen is soft. There is no mass.      Tenderness: There is no abdominal tenderness.      Hernia: No hernia is present.   Musculoskeletal:         General: No tenderness, deformity or signs of injury. Normal range of motion.      Cervical back: Normal range of motion and neck supple.   Skin:     General: Skin is warm and dry.      Capillary Refill: Capillary refill takes less than 2 seconds.      Coloration: Skin is pale.      Findings: Bruising and erythema present.   Neurological:      General: No focal deficit present.      Mental Status: He is alert and  oriented to person, place, and time. Mental status is at baseline.      Motor: Weakness present.      Coordination: Coordination normal.      Gait: Gait abnormal.   Psychiatric:         Mood and Affect: Mood normal.         Behavior: Behavior normal.         Thought Content: Thought content normal.         Judgment: Judgment normal.         Results Review:  I reviewed the patient's new clinical results.  I reviewed the patient's new imaging results and agree with the interpretation.  I reviewed the patient's other test results and agree with the interpretation  I personally viewed and interpreted the patient's EKG/Telemetry data  Discussed with ED provider.    Lab Results (last 24 hours)     Procedure Component Value Units Date/Time    CBC & Differential [[id number]]  (Abnormal) Collected: 05/07/22 2042    Specimen: Blood Updated: 05/07/22 2056    Narrative:      The following orders were created for panel order CBC & Differential.  Procedure                               Abnormality         Status                     ---------                               -----------         ------                     CBC Auto Differential[[id number]]        Abnormal            Final result                 Please view results for these tests on the individual orders.    Comprehensive Metabolic Panel [[id number]] Collected: 05/07/22 2042    Specimen: Blood Updated: 05/07/22 2121     Glucose 95 mg/dL      BUN 23 mg/dL      Creatinine 0.97 mg/dL      Sodium 142 mmol/L      Potassium 4.5 mmol/L      Chloride 107 mmol/L      CO2 23.9 mmol/L      Calcium 9.5 mg/dL      Total Protein 7.1 g/dL      Albumin 4.20 g/dL      ALT (SGPT) 12 U/L      AST (SGOT) 15 U/L      Alkaline Phosphatase 101 U/L      Total Bilirubin 0.3 mg/dL      Globulin 2.9 gm/dL      A/G Ratio 1.4 g/dL      BUN/Creatinine Ratio 23.7     Anion Gap 11.1 mmol/L      eGFR 77.5 mL/min/1.73      Comment: National Kidney Foundation and American Society of Nephrology (ASN) Task  Force recommended calculation based on the Chronic Kidney Disease Epidemiology Collaboration (CKD-EPI) equation refit without adjustment for race.       Narrative:      GFR Normal >60  Chronic Kidney Disease <60  Kidney Failure <15      Troponin [[id number]]  (Normal) Collected: 05/07/22 2042    Specimen: Blood Updated: 05/07/22 2121     Troponin T <0.010 ng/mL     Narrative:      Troponin T Reference Range:  <= 0.03 ng/mL-   Negative for AMI  >0.03 ng/mL-     Abnormal for myocardial necrosis.  Clinicians would have to utilize clinical acumen, EKG, Troponin and serial changes to determine if it is an Acute Myocardial Infarction or myocardial injury due to an underlying chronic condition.       Results may be falsely decreased if patient taking Biotin.      BNP [[id number]]  (Normal) Collected: 05/07/22 2042    Specimen: Blood Updated: 05/07/22 2119     proBNP 232.0 pg/mL     Narrative:      Among patients with dyspnea, NT-proBNP is highly sensitive for the detection of acute congestive heart failure. In addition NT-proBNP of <300 pg/ml effectively rules out acute congestive heart failure with 99% negative predictive value.    Results may be falsely decreased if patient taking Biotin.      Magnesium [[id number]]  (Normal) Collected: 05/07/22 2042    Specimen: Blood Updated: 05/07/22 2121     Magnesium 2.4 mg/dL     Procalcitonin [[id number]]  (Normal) Collected: 05/07/22 2042    Specimen: Blood Updated: 05/07/22 2128     Procalcitonin 0.09 ng/mL     Narrative:      As a Marker for Sepsis (Non-Neonates):    1. <0.5 ng/mL represents a low risk of severe sepsis and/or septic shock.  2. >2 ng/mL represents a high risk of severe sepsis and/or septic shock.    As a Marker for Lower Respiratory Tract Infections that require antibiotic therapy:    PCT on Admission    Antibiotic Therapy       6-12 Hrs later    >0.5                Strongly Recommended  >0.25 - <0.5        Recommended   0.1 - 0.25          Discouraged               "Remeasure/reassess PCT  <0.1                Strongly Discouraged     Remeasure/reassess PCT    As 28 day mortality risk marker: \"Change in Procalcitonin Result\" (>80% or <=80%) if Day 0 (or Day 1) and Day 4 values are available. Refer to http://www.[facility]-pct-calculator.com    Change in PCT <=80%  A decrease of PCT levels below or equal to 80% defines a positive change in PCT test result representing a higher risk for 28-day all-cause mortality of patients diagnosed with severe sepsis for septic shock.    Change in PCT >80%  A decrease of PCT levels of more than 80% defines a negative change in PCT result representing a lower risk for 28-day all-cause mortality of patients diagnosed with severe sepsis or septic shock.       CBC Auto Differential [[id number]]  (Abnormal) Collected: 05/07/22 2042    Specimen: Blood Updated: 05/07/22 2056     WBC 5.68 10*3/mm3      RBC 4.19 10*6/mm3      Hemoglobin 13.3 g/dL      Hematocrit 41.8 %      MCV 99.8 fL      MCH 31.7 pg      MCHC 31.8 g/dL      RDW 13.0 %      RDW-SD 48.3 fl      MPV 9.3 fL      Platelets 228 10*3/mm3      Neutrophil % 44.5 %      Lymphocyte % 32.9 %      Monocyte % 9.3 %      Eosinophil % 12.0 %      Basophil % 0.9 %      Immature Grans % 0.4 %      Neutrophils, Absolute 2.53 10*3/mm3      Lymphocytes, Absolute 1.87 10*3/mm3      Monocytes, Absolute 0.53 10*3/mm3      Eosinophils, Absolute 0.68 10*3/mm3      Basophils, Absolute 0.05 10*3/mm3      Immature Grans, Absolute 0.02 10*3/mm3      nRBC 0.0 /100 WBC     Respiratory Panel PCR w/COVID-19(SARS-CoV-2) JARRETT/AMPARO/ROYAL/PAD/COR/MAD/JERMAIN In-House, NP Swab in [facility], 3-4 HR TAT - Swab, Nasopharynx [[id number]]  (Normal) Collected: 05/07/22 2049    Specimen: Swab from Nasopharynx Updated: 05/07/22 2142     ADENOVIRUS, PCR Not Detected     Coronavirus 229E Not Detected     Coronavirus HKU1 Not Detected     Coronavirus NL63 Not Detected     Coronavirus OC43 Not Detected     COVID19 Not Detected     Human " Metapneumovirus Not Detected     Human Rhinovirus/Enterovirus Not Detected     Influenza A PCR Not Detected     Influenza B PCR Not Detected     Parainfluenza Virus 1 Not Detected     Parainfluenza Virus 2 Not Detected     Parainfluenza Virus 3 Not Detected     Parainfluenza Virus 4 Not Detected     RSV, PCR Not Detected     Bordetella pertussis pcr Not Detected     Bordetella parapertussis PCR Not Detected     Chlamydophila pneumoniae PCR Not Detected     Mycoplasma pneumo by PCR Not Detected    Narrative:      In the setting of a positive respiratory panel with a viral infection PLUS a negative procalcitonin without other underlying concern for bacterial infection, consider observing off antibiotics or discontinuation of antibiotics and continue supportive care. If the respiratory panel is positive for atypical bacterial infection (Bordetella pertussis, Chlamydophila pneumoniae, or Mycoplasma pneumoniae), consider antibiotic de-escalation to target atypical bacterial infection.          Imaging Results (Last 24 Hours)     Procedure Component Value Units Date/Time    XR Chest 1 View [[id number]] Collected: 05/07/22 2040     Updated: 05/07/22 2044    Narrative:      PORTABLE CHEST 05/07/2022 AT 8:12 PM     CLINICAL HISTORY: Dyspnea     Compared to the previous chest dated 04/26/2022.     The lungs are fairly well-expanded and appear free of infiltrates or  masses. There are no pleural effusions. The cardiomediastinal silhouette  is unremarkable.     IMPRESSIONS: No evidence of active disease within the chest.     This report was finalized on 5/7/2022 8:41 PM by Dr. Jered Santizo M.D.             Results for orders placed during the hospital encounter of 06/28/17    Adult Transthoracic Echo Complete    Interpretation Summary  · Left ventricular systolic function is normal. Calculated EF = 56.4%.  · Left ventricular diastolic dysfunction is noted (grade I) consistent with impaired relaxation  · Mild aortic valve  regurgitation is present.  · Mild mitral valve regurgitation is present  · There is no evidence of pericardial effusion.    ECG 12 Lead   Final Result   HEART RATE= 79  bpm   RR Interval= 759  ms   WI Interval= 191  ms   P Horizontal Axis= 13  deg   P Front Axis= 76  deg   QRSD Interval= 85  ms   QT Interval= 394  ms   QRS Axis= 53  deg   T Wave Axis= 68  deg   - NORMAL ECG -   Sinus rhythm   No change from previous tracing   Electronically Signed By: Davian Ansari) ([facility]) 07-May-2022 22:24:00   Date and Time of Study: 2022-05-07 20:02:46        Assessment/Plan   Assessment/Plan   Active Hospital Problems    Diagnosis  POA   • **COPD exacerbation (HCC) [J44.1]  Yes   • Hematuria [R31.9]  Yes   • Bilateral lower extremity edema [R60.0]  Yes      Resolved Hospital Problems   No resolved problems to display.       81 y.o. male admitted with COPD exacerbation (HCC).    COPD  · Continuous cardiac monitoring  · Supplemental oxygen as needed  · Continuous pulse oximetry monitoring  · Scheduled DuoNebs  · As needed albuterol treatments  · IV Solu-Medrol.  We will taper accordingly and transition to p.o. if needed    BLE edema  · Chronic for this patient but he is noncompliant with medication.  Patient states that he is on a diuretic but does not take it due to the fact that it causes frequent urination and he is unable to tolerate it  · Will continue p.o. Lasix in a.m. and monitor tolerance    Hematuria  · Only trace amount of blood noted in urine on recent urinalysis.  Hemoglobin seems stable on CBC  · Continue to monitor for increased hematuria  · Symptom management if symptoms were to arise    · SCDs for DVT prophylaxis.  · Full code.  · Discussed with patient, ED provider and Dr. Moses.      PABLITO Bowles  [facility]  05/07/22  22:37 EDT

## 2022-05-09 ENCOUNTER — READMISSION MANAGEMENT (OUTPATIENT)
Dept: CALL CENTER | Facility: HOSPITAL | Age: 82
End: 2022-05-09

## 2022-05-09 ENCOUNTER — TELEPHONE (OUTPATIENT)
Dept: FAMILY MEDICINE CLINIC | Facility: CLINIC | Age: 82
End: 2022-05-09

## 2022-05-09 VITALS
BODY MASS INDEX: 24.16 KG/M2 | HEART RATE: 97 BPM | SYSTOLIC BLOOD PRESSURE: 137 MMHG | TEMPERATURE: 97.2 F | RESPIRATION RATE: 18 BRPM | DIASTOLIC BLOOD PRESSURE: 67 MMHG | OXYGEN SATURATION: 91 % | WEIGHT: 173.2 LBS

## 2022-05-09 LAB
ANION GAP SERPL CALCULATED.3IONS-SCNC: 10.8 MMOL/L (ref 5–15)
BASOPHILS # BLD AUTO: 0 10*3/MM3 (ref 0–0.2)
BASOPHILS NFR BLD AUTO: 0 % (ref 0–1.5)
BUN SERPL-MCNC: 25 MG/DL (ref 8–23)
BUN/CREAT SERPL: 28.1 (ref 7–25)
CALCIUM SPEC-SCNC: 9 MG/DL (ref 8.6–10.5)
CHLORIDE SERPL-SCNC: 107 MMOL/L (ref 98–107)
CO2 SERPL-SCNC: 23.2 MMOL/L (ref 22–29)
CREAT SERPL-MCNC: 0.89 MG/DL (ref 0.76–1.27)
DEPRECATED RDW RBC AUTO: 46.4 FL (ref 37–54)
EGFRCR SERPLBLD CKD-EPI 2021: 85 ML/MIN/1.73
EOSINOPHIL # BLD AUTO: 0 10*3/MM3 (ref 0–0.4)
EOSINOPHIL NFR BLD AUTO: 0 % (ref 0.3–6.2)
ERYTHROCYTE [DISTWIDTH] IN BLOOD BY AUTOMATED COUNT: 12.8 % (ref 12.3–15.4)
GLUCOSE SERPL-MCNC: 122 MG/DL (ref 65–99)
HCT VFR BLD AUTO: 36.7 % (ref 37.5–51)
HGB BLD-MCNC: 12.5 G/DL (ref 13–17.7)
IMM GRANULOCYTES # BLD AUTO: 0.05 10*3/MM3 (ref 0–0.05)
IMM GRANULOCYTES NFR BLD AUTO: 0.4 % (ref 0–0.5)
LYMPHOCYTES # BLD AUTO: 0.92 10*3/MM3 (ref 0.7–3.1)
LYMPHOCYTES NFR BLD AUTO: 8 % (ref 19.6–45.3)
MCH RBC QN AUTO: 32.9 PG (ref 26.6–33)
MCHC RBC AUTO-ENTMCNC: 34.1 G/DL (ref 31.5–35.7)
MCV RBC AUTO: 96.6 FL (ref 79–97)
MONOCYTES # BLD AUTO: 0.57 10*3/MM3 (ref 0.1–0.9)
MONOCYTES NFR BLD AUTO: 4.9 % (ref 5–12)
NEUTROPHILS NFR BLD AUTO: 10.01 10*3/MM3 (ref 1.7–7)
NEUTROPHILS NFR BLD AUTO: 86.7 % (ref 42.7–76)
NRBC BLD AUTO-RTO: 0 /100 WBC (ref 0–0.2)
PLATELET # BLD AUTO: 202 10*3/MM3 (ref 140–450)
PMV BLD AUTO: 9.7 FL (ref 6–12)
POTASSIUM SERPL-SCNC: 4.3 MMOL/L (ref 3.5–5.2)
RBC # BLD AUTO: 3.8 10*6/MM3 (ref 4.14–5.8)
SODIUM SERPL-SCNC: 141 MMOL/L (ref 136–145)
WBC NRBC COR # BLD: 11.55 10*3/MM3 (ref 3.4–10.8)

## 2022-05-09 PROCEDURE — 80048 BASIC METABOLIC PNL TOTAL CA: CPT | Performed by: INTERNAL MEDICINE

## 2022-05-09 PROCEDURE — 94799 UNLISTED PULMONARY SVC/PX: CPT

## 2022-05-09 PROCEDURE — 94664 DEMO&/EVAL PT USE INHALER: CPT

## 2022-05-09 PROCEDURE — G0378 HOSPITAL OBSERVATION PER HR: HCPCS

## 2022-05-09 PROCEDURE — 63710000001 PREDNISONE PER 1 MG: Performed by: INTERNAL MEDICINE

## 2022-05-09 PROCEDURE — 94761 N-INVAS EAR/PLS OXIMETRY MLT: CPT

## 2022-05-09 PROCEDURE — 96372 THER/PROPH/DIAG INJ SC/IM: CPT

## 2022-05-09 PROCEDURE — 25010000002 ENOXAPARIN PER 10 MG

## 2022-05-09 PROCEDURE — 85025 COMPLETE CBC W/AUTO DIFF WBC: CPT | Performed by: INTERNAL MEDICINE

## 2022-05-09 RX ORDER — PREDNISONE 20 MG/1
40 TABLET ORAL
Qty: 8 TABLET | Refills: 0 | Status: SHIPPED | OUTPATIENT
Start: 2022-05-10 | End: 2022-05-14

## 2022-05-09 RX ORDER — GUAIFENESIN 600 MG/1
1200 TABLET, EXTENDED RELEASE ORAL EVERY 12 HOURS SCHEDULED
Qty: 40 TABLET | Refills: 0 | Status: SHIPPED | OUTPATIENT
Start: 2022-05-09 | End: 2022-05-19

## 2022-05-09 RX ADMIN — BUDESONIDE 0.5 MG: 0.5 INHALANT ORAL at 07:16

## 2022-05-09 RX ADMIN — PREDNISONE 40 MG: 20 TABLET ORAL at 08:47

## 2022-05-09 RX ADMIN — PANTOPRAZOLE SODIUM 40 MG: 40 TABLET, DELAYED RELEASE ORAL at 08:47

## 2022-05-09 RX ADMIN — MIRABEGRON 25 MG: 25 TABLET, FILM COATED, EXTENDED RELEASE ORAL at 08:47

## 2022-05-09 RX ADMIN — ENOXAPARIN SODIUM 40 MG: 100 INJECTION SUBCUTANEOUS at 08:46

## 2022-05-09 RX ADMIN — FLUOXETINE HYDROCHLORIDE 20 MG: 20 CAPSULE ORAL at 08:47

## 2022-05-09 RX ADMIN — GUAIFENESIN 1200 MG: 600 TABLET, EXTENDED RELEASE ORAL at 08:47

## 2022-05-09 RX ADMIN — IPRATROPIUM BROMIDE AND ALBUTEROL SULFATE 3 ML: .5; 3 SOLUTION RESPIRATORY (INHALATION) at 07:12

## 2022-05-09 RX ADMIN — FUROSEMIDE 40 MG: 40 TABLET ORAL at 08:47

## 2022-05-09 RX ADMIN — IPRATROPIUM BROMIDE AND ALBUTEROL SULFATE 3 ML: .5; 3 SOLUTION RESPIRATORY (INHALATION) at 13:33

## 2022-05-09 RX ADMIN — POLYETHYLENE GLYCOL 3350 17 G: 17 POWDER, FOR SOLUTION ORAL at 08:46

## 2022-05-09 NOTE — DISCHARGE SUMMARY
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       Presenting Problem/History of Present Illness:  Shortness of breath [R06.02]  Hypoxia [R09.02]  COPD exacerbation (HCC) [J44.1]     Hospital Course:  The patient is a 81 y.o. male with a history of COPD who presented with mildly productive cough and shortness of breath. Please see admission H&P for further details. He was found to have a COPD exacerbation and was started on IV steroids along with nebulized bronchodilators and oral expectorant. He had some mild hypoxemia on admission but this resolved and he was maintaining adequate oxygen saturations on room air. His symptoms rapidly improved. He had no evidence of infection. He was transitioned to oral steroids and he will be sent home with a few more days of this. He is medically stable and will be discharged. I have asked him to keep close PCP follow up.  Notably he has microscopic hematuria which is a known issue. He already has follow up with [facility] and he should keep this.   Also of note his last PCV 15 vaccine was in 2017. We do not actually have this vaccine here and the patient was instructed to go to a local pharmacy to his PCP for this.    Exam Today:  Blood pressure 137/67, pulse 97, temperature 97.2 °F (36.2 °C), temperature source Oral, resp. rate 17, weight 78.6 kg (173 lb 3.2 oz), SpO2 91 %.  Vitals and nursing note reviewed.   Constitutional:       General: He is not in acute distress.     Appearance: He appears chronically ill, elderly, and frail. He is not  toxic-appearing or diaphoretic.   HENT:      Head: Normocephalic and atraumatic.      Nose: Nose normal.      Mouth/Throat:      Mouth: Mucous membranes are moist.      Pharynx: Oropharynx is clear.   Eyes:      Extraocular Movements: Extraocular movements intact.      Conjunctiva/sclera: Conjunctivae normal.      Pupils: Pupils are equal, round, and reactive to light.   Cardiovascular:      Rate and Rhythm: Normal rate and regular rhythm.      Pulses: Normal pulses.   Pulmonary:      Effort: Pulmonary effort is normal.      Breath sounds: Diminished bibasilar breath sounds but no wheezing or rales  Abdominal:      General: Bowel sounds are normal.      Palpations: Abdomen is soft.   Musculoskeletal:         General: 1-2+ BLE edema present. No tenderness.      Cervical back: Normal range of motion and neck supple.   Skin:     General: Skin is warm and dry.      Capillary Refill: Capillary refill takes less than 2 seconds.   Neurological:      General: No focal deficit present.      Mental Status: He is alert and oriented to person, place, and time.   Psychiatric:         Mood and Affect: Mood normal.         Behavior: Behavior normal.     Procedures Performed:  PORTABLE CHEST 05/07/2022   CLINICAL HISTORY: Dyspnea     Compared to the previous chest dated 04/26/2022.     The lungs are fairly well-expanded and appear free of infiltrates or  masses. There are no pleural effusions. The cardiomediastinal silhouette  is unremarkable.     IMPRESSIONS: No evidence of active disease within the chest.      Consults:   Consults     Date and Time Order Name Status Description    5/7/2022  9:42 PM [facility] (on-call MD unless specified) Details             Discharge Disposition:  Home or Self Care    Discharge Medications:     Discharge Medications      New Medications      Instructions Start Date   guaiFENesin 600 MG 12 hr tablet  Commonly known as: MUCINEX   1,200 mg, Oral, Every 12 Hours Scheduled      predniSONE 20 MG  tablet  Commonly known as: DELTASONE   40 mg, Oral, Daily With Breakfast   Start Date: May 10, 2022        Changes to Medications      Instructions Start Date   albuterol 1.25 MG/3ML nebulizer solution  Commonly known as: ACCUNEB  What changed: Another medication with the same name was changed. Make sure you understand how and when to take each.   1 ampule, Inhalation      albuterol (2.5 MG/3ML) 0.083% nebulizer solution  Commonly known as: PROVENTIL  What changed: when to take this   2.5 mg, Nebulization, 4 Times Daily - RT         Continue These Medications      Instructions Start Date   Anoro Ellipta 62.5-25 MCG/INH aerosol powder  inhaler  Generic drug: umeclidinium-vilanterol   USE 1 INHALATION DAILY      budesonide 0.5 MG/2ML nebulizer solution  Commonly known as: PULMICORT   0.5 mg, Nebulization, 2 Times Daily, 1 vial only twice daily      calcium carbonate 500 MG chewable tablet  Commonly known as: TUMS   1 tablet, Oral, As Needed      colestipol 5 g granules  Commonly known as: COLESTID   5 g, Oral, 2 Times Daily, Prn after diarrhea      FLUoxetine 20 MG capsule  Commonly known as: PROzac   TAKE 1 CAPSULE DAILY      furosemide 40 MG tablet  Commonly known as: LASIX   40 mg, Oral      gabapentin 100 MG capsule  Commonly known as: NEURONTIN   100 mg, Oral, Nightly      ketoconazole 2 % shampoo  Commonly known as: NIZORAL   APPLY TOPICALLY TO THE APPROPRIATE AREA AS DIRECTED TWO TIMES A WEEK      methylphenidate 10 MG tablet  Commonly known as: Ritalin   10 mg, Oral, Daily, For ADD 3 months      multivitamin with minerals tablet tablet   1 tablet, Oral, Daily      Myrbetriq 25 MG tablet sustained-release 24 hour 24 hr tablet  Generic drug: Mirabegron ER   No dose, route, or frequency recorded.      oxyCODONE-acetaminophen 7.5-325 MG per tablet  Commonly known as: PERCOCET   1 tablet, Oral, Every 8 Hours PRN, 30 day prescription.      OXYGEN-HELIUM IN   2.5 L, Inhalation, Daily PRN      pantoprazole 40 MG EC  tablet  Commonly known as: PROTONIX   TAKE 1 TABLET DAILY      polyethylene glycol 17 g packet  Commonly known as: MIRALAX   DISSOLVE ONE PACKET IN 8OZ LIQUID & DRINK BY MOUTH DAILY      potassium chloride 20 MEQ CR tablet  Commonly known as: K-DUR,KLOR-CON   1 tablet, Oral, Daily      Rollator Ultra-Light misc   1 application, Does not apply, Daily, Severe DJD      rOPINIRole 0.5 MG tablet  Commonly known as: REQUIP   TAKE 1 TO 2 TABLETS EVERY NIGHT 1 HOUR BEFORE BEDTIME      sodium chloride 7 % nebulizer solution nebulizer solution   No dose, route, or frequency recorded.      terbinafine 250 MG tablet  Commonly known as: lamiSIL   No dose, route, or frequency recorded.      TiZANidine 6 MG capsule  Commonly known as: ZANAFLEX   TAKE 1 CAPSULE EVERY NIGHT      Turmeric 500 MG capsule   Oral, Daily      vitamin B-12 2500 MCG sublingual tablet tablet  Commonly known as: CYANOCOBALAMIN   2,500 mcg, Oral, Every Evening             Discharge Diet:   Diet Instructions     Diet: Regular, Cardiac; Thin      Discharge Diet:  Regular  Cardiac       Fluid Consistency: Thin          Activity at Discharge:   Activity Instructions     Activity as Tolerated            Follow-up Appointments:  Future Appointments   Date Time Provider Department Center   9/1/2022 10:30 AM Erich Aviles MD [facility]     Additional Instructions for the Follow-ups that You Need to Schedule     Discharge Follow-up with PCP   As directed       Currently Documented PCP:    Erich Aviles MD    PCP Phone Number:    [phone]     Follow Up Details: 1 week               Test Results Pending at Discharge:       Juan Howell MD  05/09/22  10:57 EDT    Time Spent on Discharge Activities:

## 2022-05-09 NOTE — CASE MANAGEMENT/SOCIAL WORK
Discharge Planning Assessment  [facility]     Patient Name: Tony Leonard  MRN: [id number]  Today's Date: 5/9/2022    Admit Date: 5/7/2022     Discharge Needs Assessment    No documentation.                Discharge Plan     Row Name 05/09/22 1600       Plan    Plan DC home with family    Patient/Family in Agreement with Plan yes    Plan Comments I spoke with the patient at bedside with his permission.  I introduced myself and explained my role as .  I verified the information on the facesheet and the patients PCP as Dr. Erich Aviles.  The patient uses [facility] on [address] in [place] and he is able to pay for and obtain his medications. The patient lives in a Senior Living Apartment on the third floor.  He lives alone. There is an elevator for access and he is able to enter and maneuver around the home with no issues.  The patient is independent with his ADL’s has a car and drives.  His friend will pick him up at discharge. The patient has DME at home including a rollator, grab bars, elevated commode, shower seat and oxygen provided by [employer].  He denied needing any further DME at discharge. I offered information regarding home health and other community resources and he declined the information.  The patient will discharge home with his family for assistance if needed and he is agreeable to that plan.  He denied having any further questions or concerns at this time. CM will follow for further needs.              Continued Care and Services - Discharged on 5/9/2022 Admission date: 5/7/2022 - Discharge disposition: Home or Self Care   Coordination has not been started for this encounter.       Expected Discharge Date and Time     Expected Discharge Date Expected Discharge Time    May 9, 2022          Demographic Summary    No documentation.                Functional Status    No documentation.                Psychosocial    No documentation.                Abuse/Neglect    No  documentation.                Legal    No documentation.                Substance Abuse    No documentation.                Patient Forms    No documentation.                   Deneen Ellsworth RN

## 2022-05-09 NOTE — TELEPHONE ENCOUNTER
Caller: Tony Leonard    Relationship to patient: Self    Best call back number: [phone]    New or established patient?  [] New  [x] Established    Date of discharge:  5/9/2022    Facility discharged from: [facility]     Diagnosis/Symptoms: N/A     Length of stay (If applicable): N/A     FIRST AVAILBILE  WITH DR. BOYD NOT UNTIL June HUB ATTEMPTED TO WARM TRANSFER NOT SUCCESSFUL.         5-Fu Counseling: 5-Fluorouracil Counseling:  I discussed with the patient the risks of 5-fluorouracil including but not limited to erythema, scaling, itching, weeping, crusting, and pain.

## 2022-05-09 NOTE — PLAN OF CARE
Goal Outcome Evaluation:  Plan of Care Reviewed With: patient        Progress: no change  Outcome Evaluation: A&O. VSS. No complaints of SOB. Still impulsive with getting OOB - reminded to use call light. Bed alarm set. Up with 1 with walker to BR. No complaints of pain. Will continue to monitor.

## 2022-05-09 NOTE — OUTREACH NOTE
Prep Survey    Flowsheet Row Responses   Religion facility patient discharged from? [place]   Is LACE score < 7 ? No   Emergency Room discharge w/ pulse ox? No   Eligibility [facility]   Date of Admission 05/07/22   Date of Discharge 05/09/22   Discharge Disposition Home or Self Care   Discharge diagnosis COPD exac   Does the patient have one of the following disease processes/diagnoses(primary or secondary)? COPD/Pneumonia   Does the patient have Home health ordered? No   Is there a DME ordered? No   Prep survey completed? Yes          MICHELLE A - Registered Nurse

## 2022-05-09 NOTE — TELEPHONE ENCOUNTER
Thank you.  It has been low in our office a few times now.  Unfortunately, he's on multiple medications that can affect his blood pressure and he's never very clear about which medications he's taken and at what times.  We have to err on the side of caution with prescribing for him.

## 2022-05-10 ENCOUNTER — TRANSITIONAL CARE MANAGEMENT TELEPHONE ENCOUNTER (OUTPATIENT)
Dept: CALL CENTER | Facility: HOSPITAL | Age: 82
End: 2022-05-10

## 2022-05-10 NOTE — OUTREACH NOTE
Call Center TCM Note    Flowsheet Row Responses   [facility] patient discharged from? [facility]   Does the patient have one of the following disease processes/diagnoses(primary or secondary)? COPD/Pneumonia   Was the primary reason for admission: COPD exacerbation   TCM attempt successful? Yes   Call start time 1344   Call end time 1349   Discharge diagnosis COPD exac   Meds reviewed with patient/caregiver? Yes   Is the patient having any side effects they believe may be caused by any medication additions or changes? No   Does the patient have all medications ordered at discharge? Yes   Is the patient taking all medications as directed (includes completed medication regime)? No   What is preventing the patient from taking all medications as directed? Other   Nursing Interventions Nurse provided patient education   Medication comments Pt will call afshan to see if meds are ready and  meds today   Does the patient have a primary care provider?  Yes   Does the patient have an appointment with their PCP or specialist within 7 days of discharge? Greater than 7 days   Comments regarding PCP HOSP DC FU appt with PABLITO Velez 5/17/22 @ 2:45 pm.    What is preventing the patient from scheduling follow up appointments within 7 days of discharge? Earlier appointment not available   Nursing Interventions Verified appointment date/time/provider   Has the patient kept scheduled appointments due by today? N/A   Has home health visited the patient within 72 hours of discharge? N/A   DME comments Wears O2 at night   Pulse Ox monitoring None   Psychosocial issues? No   Did the patient receive a copy of their discharge instructions? Yes   Nursing interventions Reviewed instructions with patient   What is the patient's perception of their health status since discharge? Improving   Nursing Interventions Nurse provided patient education   Is the patient/caregiver able to teach back the hierarchy of who to call/visit for  symptoms/problems? PCP, Specialist, Home health nurse, Urgent Care, ED, 911 Yes   Is the patient able to teach back COPD zones? Yes   Nursing interventions Education provided on various zones   Patient reports what zone on this call? Green Zone   Green Zone Reports doing well, Breathing without shortness of breath   Green Zone interventions: Take daily medications, Use oxygen as prescribed   TCM call completed? Yes   Wrap up additional comments Pt reports his breathing is better. FU appt with PCP office made.           Leatha Noel RN    5/10/2022, 13:50 EDT

## 2022-05-10 NOTE — CASE MANAGEMENT/SOCIAL WORK
Case Management Discharge Note      Final Note: Home with family. Transport by private auto         Selected Continued Care - Discharged on 5/9/2022 Admission date: 5/7/2022 - Discharge disposition: Home or Self Care    Destination    No services have been selected for the patient.              Durable Medical Equipment    No services have been selected for the patient.              Dialysis/Infusion    No services have been selected for the patient.              Home Medical Care    No services have been selected for the patient.              Therapy    No services have been selected for the patient.              Community Resources    No services have been selected for the patient.              Community & DME    No services have been selected for the patient.                  Transportation Services  Private: Car    Final Discharge Disposition Code: 01 - home or self-care

## 2022-05-16 ENCOUNTER — TELEPHONE (OUTPATIENT)
Dept: FAMILY MEDICINE CLINIC | Facility: CLINIC | Age: 82
End: 2022-05-16

## 2022-05-16 NOTE — TELEPHONE ENCOUNTER
Caller: Tony Leonard    Relationship to patient: Self    Best call back number: [phone]    Patient is needing:BLOOD PRESSURE   135/71

## 2022-05-17 NOTE — TELEPHONE ENCOUNTER
PATIENT CALLED REQUESTING A PHONE EXTENSION NUMBER TO CALL AND REPORT HIS BLOOD PRESSURE READING EVERYDAY. DR BOYD HAS REQUESTED HIM TO REPORT DAILY UNTIL NEXT APPOINTMENT.    PLEASE ADVISE    [phone]

## 2022-05-18 ENCOUNTER — TELEPHONE (OUTPATIENT)
Dept: FAMILY MEDICINE CLINIC | Facility: CLINIC | Age: 82
End: 2022-05-18

## 2022-05-18 NOTE — TELEPHONE ENCOUNTER
PATIENT STATES HE IS SUPPOSE TO CALL WITH HIS BP EVERY DAY. JUST TOOK BP IT /66.    PATIENT CALLBACK: [phone]

## 2022-05-19 NOTE — TELEPHONE ENCOUNTER
Caller: Tony Leonard    Relationship: Self    Best call back number: [phone]    What was the call regarding: PATIENT CALLING IN WITH BLOOD PRESSURE READING.    BLOOD PRESSURE - 05/19/2022- 112/70

## 2022-05-20 ENCOUNTER — TELEPHONE (OUTPATIENT)
Dept: FAMILY MEDICINE CLINIC | Facility: CLINIC | Age: 82
End: 2022-05-20

## 2022-05-20 NOTE — TELEPHONE ENCOUNTER
PATIENT CALLED WITH DAILY BLOOD PRESSURE READING /61    CALL BACK NUMBER [phone]   Oriented - self; Oriented - place; Oriented - time

## 2022-05-24 ENCOUNTER — TELEPHONE (OUTPATIENT)
Dept: FAMILY MEDICINE CLINIC | Facility: CLINIC | Age: 82
End: 2022-05-24

## 2022-05-24 ENCOUNTER — HOSPITAL ENCOUNTER (EMERGENCY)
Facility: HOSPITAL | Age: 82
Discharge: HOME | End: 2022-05-24
Attending: EMERGENCY MEDICINE | Admitting: EMERGENCY MEDICINE

## 2022-05-24 ENCOUNTER — TELEPHONE (OUTPATIENT)
Dept: ORTHOPEDIC SURGERY | Facility: CLINIC | Age: 82
End: 2022-05-24

## 2022-05-24 ENCOUNTER — APPOINTMENT (OUTPATIENT)
Dept: GENERAL RADIOLOGY | Facility: HOSPITAL | Age: 82
End: 2022-05-24

## 2022-05-24 VITALS
HEIGHT: 71 IN | BODY MASS INDEX: 25.2 KG/M2 | DIASTOLIC BLOOD PRESSURE: 73 MMHG | WEIGHT: 180 LBS | HEART RATE: 79 BPM | SYSTOLIC BLOOD PRESSURE: 152 MMHG | RESPIRATION RATE: 18 BRPM | OXYGEN SATURATION: 97 % | TEMPERATURE: 97.3 F

## 2022-05-24 DIAGNOSIS — M25.552 LEFT HIP PAIN: Primary | ICD-10-CM

## 2022-05-24 DIAGNOSIS — S76.012A HIP STRAIN, LEFT, INITIAL ENCOUNTER: ICD-10-CM

## 2022-05-24 PROCEDURE — 99283 EMERGENCY DEPT VISIT LOW MDM: CPT

## 2022-05-24 PROCEDURE — 73502 X-RAY EXAM HIP UNI 2-3 VIEWS: CPT

## 2022-05-24 NOTE — ED PROVIDER NOTES
EMERGENCY DEPARTMENT ENCOUNTER    Room Number:  B10/10  Date seen:  5/24/2022  Time seen: 13:05 EDT  PCP: Erich Aviles MD  Historian: patient      HPI:  Chief Complaint: left hip pain    A complete HPI/ROS/PMH/PSH/SH/FH are unobtainable due to: none    Context: Tony Leonard is a 82 y.o. male who presents to the ED for evaluation of left hip pain that radiates down the anteromedial left thigh almost to the level of the knee.  He states it started approximately 1 week ago and waxes and wanes.  He is able to perform range of motion though pain increases with weightbearing.  He walks with a rolling walker and is walking at his baseline.  He denies any known falls or injuries.  Denies any leg swelling, saddle paresthesias or extremity weakness.  Denies any back pain.        PAST MEDICAL HISTORY  Active Ambulatory Problems     Diagnosis Date Noted   • Thrush, oral 03/11/2016   • ADD (attention deficit disorder) 03/11/2016   • Hemorrhoids 03/11/2016   • Bronchiectasis with acute lower respiratory infection ([facility]) 03/11/2016   • Diverticul disease small and large intestine, no perforati or abscess 03/11/2016   • Benign non-nodular prostatic hyperplasia without lower urinary tract symptoms 03/11/2016   • Gastroesophageal reflux disease 03/11/2016   • Malaise and fatigue 03/11/2016   • Environmental allergies 04/25/2016   • Hemoptysis 04/27/2016   • Dyslipidemia 05/11/2016   • Primary osteoarthritis involving multiple joints 05/11/2016   • Pneumonia of right lower lobe due to infectious organism 10/03/2016   • Abnormal EKG 10/04/2016   • COPD (chronic obstructive pulmonary disease) ([facility]) 10/04/2016   • Mild malnutrition ([facility]) 03/28/2017   • Acute on chronic respiratory failure with hypoxia ([facility]) 04/27/2017   • Fracture, intertrochanteric, right femur, closed, initial encounter ([facility]) 01/16/2018   • Hematoma of frontal scalp 01/16/2018   • Postoperative anemia due to acute blood loss 01/19/2018   • Urinary retention  01/25/2018   • Hemorrhagic disorder due to circulating anticoagulants ([facility]) 01/29/2018   • History of fracture of right hip 02/22/2018   • Closed fracture of right hip with routine healing 02/28/2018   • Chronic low back pain with sciatica 06/21/2018   • Change in bowel function 04/18/2019   • Rectal bleeding 04/18/2019   • Prostate cancer ([facility]) 04/22/2019   • On home oxygen therapy 09/24/2019   • Acute viral bronchitis 12/29/2019   • Acute on chronic systolic (congestive) heart failure ([facility]) 10/14/2020   • Peripheral neuropathy 07/01/2021   • Plantar fasciitis 09/08/2021   • COPD exacerbation ([facility]) 05/07/2022   • Bilateral lower extremity edema 05/07/2022   • Microscopic hematuria 05/08/2022     Resolved Ambulatory Problems     Diagnosis Date Noted   • Pneumonia of both lower lobes due to infectious organism 03/11/2016   • Pneumonia of right upper lobe due to infectious organism 04/22/2016   • COPD exacerbation ([facility]) 04/25/2016   • GERD (gastroesophageal reflux disease) 04/25/2016   • Chronic respiratory failure with hypoxia ([facility]) 10/04/2016   • Bacterial lobar pneumonia 12/27/2016   • Pneumonia of left lower lobe due to infectious organism 03/26/2017   • Bronchitis 06/01/2017   • COPD exacerbation ([facility]) 06/17/2017   • Chronic diastolic CHF (congestive heart failure) ([facility]) 01/16/2018   • Leukocytosis 01/16/2018   • Right upper lobe pneumonia 01/20/2018   • Mucus plugging of bronchi 01/16/2018   • COPD exacerbation ([facility]) 04/16/2018   • Degenerative joint disease (DJD) of hip 09/23/2019   • Bronchiectasis with (acute) exacerbation ([facility]) 12/28/2019   • Primary hypertension 05/06/2022     Past Medical History:   Diagnosis Date   • ADHD (attention deficit hyperactivity disorder)    • Bronchiectasis ([facility])    • Colon polyp    • Diverticulosis    • Hard of hearing    • Pneumonia          PAST SURGICAL HISTORY  Past Surgical History:   Procedure Laterality Date   • BRONCHOSCOPY N/A 4/30/2016    Procedure: BRONCHOSCOPY with  BAL of right lower lobe and left  lower lobe.  ;  Surgeon: Nando Diaz MD;  Location: [facility] ENDOSCOPY;  Service:    • BRONCHOSCOPY N/A 4/28/2017    Procedure: BRONCHOSCOPY;  Surgeon: Katharina Salter MD;  Location: [facility] ENDOSCOPY;  Service:    • BRONCHOSCOPY Bilateral 6/29/2017    Procedure: BRONCHOSCOPY WITH WASHINGS;  Surgeon: Katharina Salter MD;  Location: [facility] ENDOSCOPY;  Service:    • BRONCHOSCOPY N/A 1/22/2018    Procedure: BRONCHOSCOPY AT BEDSIDE with BAL;  Surgeon: Katharina Salter MD;  [facility] ENDOSCOPY;  Service:    • BRONCHOSCOPY N/A 1/30/2018    Procedure: BRONCHOSCOPY with BAL and washing;  Surgeon: Shreyas Wild MD;  Location: [facility] ENDOSCOPY;  Service:    • BRONCHOSCOPY Bilateral 4/24/2018    Procedure: BRONCHOSCOPY WITH WASHING;  Surgeon: Katharina Salter MD;  Location: [facility] ENDOSCOPY;  Service: Pulmonary   • BRONCHOSCOPY N/A 12/28/2019    Procedure: BRONCHOSCOPY with bilateral washing;  Surgeon: Katharina Salter MD;  Location: [facility] ENDOSCOPY;  Service: Pulmonary   • COLONOSCOPY  05/17/2013    eh, ih, tort, sig tics   • COLONOSCOPY N/A 5/10/2019    Non-thrombosed external hemorrhoids found on perianal exam, Diverticulosis, Tortuous colon, One 5 mm polyp in the mid ascending colon, IH. Path: Tubular adenoma.    • ENDOSCOPY  03/19/2015    z line irreg, hh   • HIP OPEN REDUCTION Right 1/17/2018    Procedure: HIP OPEN REDUCTION INTERNAL FIXATION WITH DYNAMIC HIP SCREW;  Surgeon: Les Black MD;  Location: [facility] OR;  Service:    • SPINE SURGERY     • TONSILLECTOMY     • TOTAL HIP ARTHROPLASTY REVISION Right 9/23/2019    Procedure: HIP  REVISION RIGHT;  Surgeon: Isauro Warner MD;  Location: [facility] MAIN OR;  Service: Orthopedics         FAMILY HISTORY  Family History   Problem Relation Age of Onset   • Thyroid disease Mother    • No Known Problems Father    • Malig Hyperthermia Neg Hx          SOCIAL HISTORY  Social History     Socioeconomic History   • Marital status: Single    Tobacco Use   • Smoking status: Never Smoker   • Smokeless tobacco: Never Used   Substance and Sexual Activity   • Alcohol use: No     Comment: red wine occassional   • Drug use: No   • Sexual activity: Defer         ALLERGIES  Daliresp [roflumilast], Latex, and Sulfa antibiotics        REVIEW OF SYSTEMS  Review of Systems     All systems reviewed and negative except for those discussed in HPI.       PHYSICAL EXAM  ED Triage Vitals   Temp Heart Rate Resp BP SpO2   05/24/22 1217 05/24/22 1217 05/24/22 1217 05/24/22 1236 05/24/22 1217   97.3 °F (36.3 °C) 64 16 105/47 97 %      Temp src Heart Rate Source Patient Position BP Location FiO2 (%)   05/24/22 1217 05/24/22 1217 -- -- --   Tympanic Monitor            GENERAL: not distressed  HENT: atraumatic  EYES: no scleral icterus  CV: regular rate  RESPIRATORY: normal effort  ABDOMEN: soft, nontender nondistended  MUSCULOSKELETAL: no deformity.  There is some mild tenderness to the left hip and inguinal area.  No adenopathy or hernia appreciated.  There is no skin changes in the area.  No other tenderness to the left lower extremity.  Bilateral lower extremities have 1+ edema distally.  DP and PT pulses are palpable.  Sensation intact distally, cap refill brisk.  Full range of motion of the bilateral lower extremities as exhibited.  NEURO: alert, moves all extremities, follows commands  SKIN: warm, dry    Vital signs and nursing notes reviewed.            RADIOLOGY      XR Hip With or Without Pelvis 2 - 3 View Left    Result Date: 5/24/2022  Narrative: TWO-VIEW LEFT HIP AND ONE VIEW AP PELVIS  HISTORY: Left hip pain. No trauma.  FINDINGS: The patient has previous total hip replacement on the right. On the left there is very minimal degenerative change similar to the study of 09/26/2019. No acute abnormality is seen. There are very minimal degenerative changes at both sacroiliac joints unchanged.  This report was finalized on 5/24/2022 1:51 PM by Dr. Julio Bonilla M.D.         I ordered the above noted radiological studies. Reviewed by me and discussed with radiologist.  See dictation for official radiology interpretation.    PROCEDURES  Procedures        MEDICATIONS GIVEN IN ER  Medications - No data to display          PROGRESS AND CONSULTS    DDX includes but not limited to radiculopathy, hernia, adenopathy, fracture, soft tissue injury, strain    ED Course as of 05/24/22 2015   Tue May 24, 2022   1438 My interpretation of left hip x-rays is no acute fracture [KA]   1439 I reassessed the patient.  He is resting comfortably.  Has no neurologic deficits, normal neurovascular examination.  X-rays unremarkable for any acute findings.  Will ensure the patient can ambulate with his walker at baseline.  He has previously seen Dr. Warner of orthopedics for right hip replacement.  He is interested in information for other [facility] affiliated orthopedic offices so we will give him information for follow-up.  If he ambulates okay he can be discharged, he can take Tylenol for pain, activities as tolerated and return as needed. [KA]      ED Course User Index  [KA] Rose Doshi PA             Patient was placed in face mask in first look. Patient was wearing facemask each time I entered the room and throughout our encounter. I wore protective equipment throughout this patient encounter including a face mask, eye shield and gloves. Hand hygiene was performed before donning protective equipment and after removal when leaving the room.        DIAGNOSIS  Final diagnoses:   Left hip pain   Hip strain, left, initial encounter         Follow Up:  Erich Aviles MD  [address] [id number] [phone]          Jered Vann MD  [address] [id number]  [phone]    Schedule an appointment as soon as possible for a visit       PATIENT CONNECTION - [place] [id number]  [phone]  Call today      Isauro Warner MD  [address]  [facility] [address]  [phone]            RX:     Medication List      Changed    * albuterol 1.25 MG/3ML nebulizer solution  Commonly known as: ACCUNEB  What changed: Another medication with the same name was changed. Make sure you understand how and when to take each.     * albuterol (2.5 MG/3ML) 0.083% nebulizer solution  Commonly known as: PROVENTIL  Take 2.5 mg by nebulization 4 (Four) Times a Day.  What changed: when to take this         * This list has 2 medication(s) that are the same as other medications prescribed for you. Read the directions carefully, and ask your doctor or other care provider to review them with you.                  Latest Documented Vital Signs:  As of 20:15 EDT  BP- 152/73 HR- 79 Temp- 97.3 °F (36.3 °C) (Tympanic) O2 sat- 97%       Rose Doshi PA  05/24/22 2014       Rose Doshi PA  05/24/22 2015

## 2022-05-24 NOTE — ED NOTES
Pt c/o left inner, upper leg pain that started approx 1wk ago. Pain radiates down into knee. Denies injury. Pedal pulses strong    This RN wore mask and goggles during time of contact

## 2022-05-24 NOTE — ED PROVIDER NOTES
MD ATTESTATION NOTE    The GUILHERME and I have discussed this patient's history, physical exam, and treatment plan.  I have reviewed the documentation and personally had a face to face interaction with the patient. I affirm the documentation and agree with the treatment and plan.  The attached note describes my personal findings.      I provided a substantive portion of the care of the patient.  I personally performed the physical exam in its entirety, and below are my findings.  For this patient encounter, the patient wore surgical mask, I wore full protective PPE including N95 and eye protection    Brief HPI: 82-year-old male who presents to the ER for left medial thigh pain that is worse with movement and ambulation for the past week.  The patient denies fevers, chills or recent injury.  Patient states he is still able to ambulate with his walker.    General : 82-year-old patient is awake alert and oriented  HEENT: NCAT  Ext: No acute abnormalities: Patient has full range of motion of his left leg without difficulty.  He is point tender over his abductor muscles in her his left thigh.  He has no swelling, calf tenderness and is neurovascular intact in his left foot.  Skin: No rash  Neuro: Cranial nerves II through XII grossly intact as tested.  No acute lateralizing deficits.  Psych: Normal mood and affect      Plan: We will x-ray the patient's hip    X-rays are unremarkable.    We have been able to ambulate the patient with his walker.  Currently the patient is requesting narcotics for his pain control however I think an 82-year-old on a walker the Tylenol will be sufficient.  We advised him to follow-up with orthopedic surgeon for further evaluation and care.       Lexa Montesinos MD  05/24/22 5404

## 2022-05-24 NOTE — ED TRIAGE NOTES
Pt reports that for a week he has been unable to bear weight r/t left leg pain - nki    Patient was placed in face mask during first look triage.  Patient was wearing a face mask throughout encounter.  I wore personal protective equipment throughout the encounter.  Hand hygiene was performed before and after patient encounter.

## 2022-05-24 NOTE — DISCHARGE INSTRUCTIONS
You can call the orthopaedist listed or one of your choice to schedule follow up.    Rest the area. You can take tylenol 650mg every 6 hours as needed for pain.    Return to the ER as needed.

## 2022-05-24 NOTE — TELEPHONE ENCOUNTER
Caller: Tony Leonard    Relationship: Self    Best call back number: [phone]    What was the call regarding: PATIENT CALLED TO GIVE DR BOYD HIS BLOOD PRESSURE READINGS:    Monday-157/81  Tuesday-82/52, 90/60, 105/47    PATIENT IS CURRENTLY AT [facility] FOR A DIFFERENT ISSUE BUT WANTED TO MAKE DR BOYD AWARE. NO OTHER INFORMATION WAS GIVEN.     Do you require a callback: IF ANY QUESTIONS

## 2022-05-24 NOTE — TELEPHONE ENCOUNTER
Caller: ANTIONE CARTY    Relationship: [employer] PH[employer]ICAL [employer]ER[employer]IST    Best call back number:  [phone]    What is the best time to reach you: , ANYTIME BESIDES 1-2 PM AND BEFORE 6 PM.     Who are you requesting to speak with (clinical staff, provider,  specific staff member): DR. DE SOUZA    What was the call regarding: DIDN'T GO INTO DETAIL. WANTS TO DISCUSS SOME THINGS REGARDING THE PATIENT.    Do you require a callback: YES, PLEASE ADVISE.

## 2022-05-25 ENCOUNTER — READMISSION MANAGEMENT (OUTPATIENT)
Dept: CALL CENTER | Facility: HOSPITAL | Age: 82
End: 2022-05-25

## 2022-05-25 NOTE — OUTREACH NOTE
COPD/PN Week 3 Survey    Flowsheet Row Responses   [facility] facility patient discharged from? [place]   Does the patient have one of the following disease processes/diagnoses(primary or secondary)? COPD/Pneumonia   Was the primary reason for admission: COPD exacerbation   Week 3 attempt successful? No   Unsuccessful attempts Attempt 1          DI DC - Registered Nurse

## 2022-05-31 ENCOUNTER — READMISSION MANAGEMENT (OUTPATIENT)
Dept: CALL CENTER | Facility: HOSPITAL | Age: 82
End: 2022-05-31

## 2022-05-31 NOTE — OUTREACH NOTE
COPD/PN Week 3 Survey    Flowsheet Row Responses   [facility] facility patient discharged from? [place]   Does the patient have one of the following disease processes/diagnoses(primary or secondary)? COPD/Pneumonia   Was the primary reason for admission: COPD exacerbation   Week 3 attempt successful? No   Unsuccessful attempts Attempt 2          DI DC - Registered Nurse

## 2022-06-08 ENCOUNTER — TELEPHONE (OUTPATIENT)
Dept: PAIN MEDICINE | Facility: CLINIC | Age: 82
End: 2022-06-08

## 2022-06-08 DIAGNOSIS — M54.42 CHRONIC RIGHT-SIDED LOW BACK PAIN WITH LEFT-SIDED SCIATICA: ICD-10-CM

## 2022-06-08 DIAGNOSIS — M47.816 OSTEOARTHRITIS OF LUMBAR SPINE, UNSPECIFIED SPINAL OSTEOARTHRITIS COMPLICATION STATUS: ICD-10-CM

## 2022-06-08 DIAGNOSIS — G89.29 CHRONIC RIGHT-SIDED LOW BACK PAIN WITH LEFT-SIDED SCIATICA: ICD-10-CM

## 2022-06-08 DIAGNOSIS — M41.9 SCOLIOSIS OF LUMBAR SPINE, UNSPECIFIED SCOLIOSIS TYPE: ICD-10-CM

## 2022-06-08 DIAGNOSIS — M54.16 LUMBAR RADICULOPATHY: ICD-10-CM

## 2022-06-08 DIAGNOSIS — Z98.890 HISTORY OF BACK SURGERY: ICD-10-CM

## 2022-06-08 DIAGNOSIS — M79.671 PAIN IN BOTH FEET: ICD-10-CM

## 2022-06-08 DIAGNOSIS — M79.672 PAIN IN BOTH FEET: ICD-10-CM

## 2022-06-08 RX ORDER — OXYCODONE AND ACETAMINOPHEN 7.5; 325 MG/1; MG/1
1 TABLET ORAL EVERY 8 HOURS PRN
Qty: 60 TABLET | Refills: 0 | Status: CANCELLED | OUTPATIENT
Start: 2022-06-08

## 2022-06-08 NOTE — TELEPHONE ENCOUNTER
Caller: RAMIRO    Relationship: SELF    Best call back number: [phone]    Requested Prescriptions:   Requested Prescriptions     Pending Prescriptions Disp Refills   • oxyCODONE-acetaminophen (PERCOCET) 7.5-325 MG per tablet 60 tablet 0     Sig: Take 1 tablet by mouth Every 8 (Eight) Hours As Needed for Severe Pain . 30 day prescription.        Pharmacy where request should be sent:      Pharmacy:  [address] & ([facility] [phone] [facility] [phone] FX        Additional details provided by patient: OUT OF MEDS    Does the patient have less than a 3 day supply:  [x] Yes  [] No    Cherelle Serna   06/08/22 14:38 EDT

## 2022-06-09 NOTE — TELEPHONE ENCOUNTER
Please see my previous documentation. We need to make sure to provide some type of formal intervention only notification if this has not already been done.

## 2022-06-10 ENCOUNTER — OFFICE VISIT (OUTPATIENT)
Dept: FAMILY MEDICINE CLINIC | Facility: CLINIC | Age: 82
End: 2022-06-10

## 2022-06-10 VITALS
BODY MASS INDEX: 25.2 KG/M2 | HEIGHT: 71 IN | SYSTOLIC BLOOD PRESSURE: 120 MMHG | DIASTOLIC BLOOD PRESSURE: 60 MMHG | HEART RATE: 70 BPM | WEIGHT: 180 LBS

## 2022-06-10 DIAGNOSIS — M54.40 CHRONIC LOW BACK PAIN WITH SCIATICA, SCIATICA LATERALITY UNSPECIFIED, UNSPECIFIED BACK PAIN LATERALITY: Primary | ICD-10-CM

## 2022-06-10 DIAGNOSIS — G89.29 CHRONIC LOW BACK PAIN WITH SCIATICA, SCIATICA LATERALITY UNSPECIFIED, UNSPECIFIED BACK PAIN LATERALITY: Primary | ICD-10-CM

## 2022-06-10 PROCEDURE — 99212 OFFICE O/P EST SF 10 MIN: CPT | Performed by: INTERNAL MEDICINE

## 2022-06-10 RX ORDER — NAPROXEN 500 MG/1
500 TABLET ORAL 2 TIMES DAILY WITH MEALS
Qty: 60 TABLET | Refills: 3 | Status: SHIPPED | OUTPATIENT
Start: 2022-06-10 | End: 2022-09-24 | Stop reason: HOSPADM

## 2022-06-10 RX ORDER — GABAPENTIN 300 MG/1
300 CAPSULE ORAL 3 TIMES DAILY
Qty: 90 CAPSULE | Refills: 3 | Status: ON HOLD | OUTPATIENT
Start: 2022-06-10 | End: 2022-08-06 | Stop reason: SDUPTHER

## 2022-06-10 NOTE — PROGRESS NOTES
Subjective   Tony Leonard is a 82 y.o. male.     Vitals:    06/10/22 1509   BP: 120/60   Pulse: 70      Body mass index is 25.1 kg/m².     History of Present Illness   Patient was seen for severe low back pain.  Patient has severe spinal stenosis at multiple levels lumbar spine.  Patient has severe pain and is seeing the pain clinic.  Patient been able to get medicine from the clinic is being seen in the office today.  Patient had his gabapentin increased to 3 mg 3 times daily, naproxen 500 mg twice daily with 2 Tylenol, he was also advised to get over the counter Lidoderm patches.  Patient was referred to a back surgeon urgently.    Dictated utilizing Dragon dictation. If there are questions or for further clarification, please contact me.  The following portions of the patient's history were reviewed and updated as appropriate: allergies, current medications, past family history, past medical history, past social history, past surgical history and problem list.    Review of Systems   Constitutional: Negative for fatigue and fever.   HENT: Positive for congestion. Negative for trouble swallowing.    Eyes: Negative for discharge and visual disturbance.   Respiratory: Negative for choking and shortness of breath.    Cardiovascular: Negative for chest pain and palpitations.   Gastrointestinal: Negative for abdominal pain and blood in stool.   Endocrine: Negative.    Genitourinary: Negative for genital sores and hematuria.   Musculoskeletal: Positive for back pain and gait problem. Negative for joint swelling.   Skin: Negative for color change, pallor, rash and wound.   Allergic/Immunologic: Positive for environmental allergies. Negative for immunocompromised state.   Neurological: Negative for facial asymmetry and speech difficulty.   Psychiatric/Behavioral: Negative for hallucinations and suicidal ideas.       Objective   Physical Exam  Vitals and nursing note reviewed.   Constitutional:       Appearance: Normal  appearance. He is well-developed.   HENT:      Head: Normocephalic and atraumatic.      Nose: Nose normal.      Mouth/Throat:      Mouth: Mucous membranes are moist.      Pharynx: Oropharynx is clear.   Eyes:      Extraocular Movements: Extraocular movements intact.      Conjunctiva/sclera: Conjunctivae normal.      Pupils: Pupils are equal, round, and reactive to light.   Cardiovascular:      Rate and Rhythm: Normal rate and regular rhythm.      Heart sounds: Normal heart sounds. No murmur heard.    No friction rub. No gallop.   Pulmonary:      Effort: Pulmonary effort is normal. No respiratory distress.      Breath sounds: Normal breath sounds. No stridor. No wheezing, rhonchi or rales.   Chest:      Chest wall: No tenderness.   Abdominal:      General: Bowel sounds are normal.      Palpations: Abdomen is soft.   Musculoskeletal:         General: Swelling, tenderness and deformity present. Normal range of motion.      Cervical back: Normal range of motion and neck supple.   Skin:     General: Skin is warm and dry.   Neurological:      General: No focal deficit present.      Mental Status: He is alert and oriented to person, place, and time. Mental status is at baseline.      Motor: Weakness present.      Coordination: Coordination abnormal.      Gait: Gait abnormal.   Psychiatric:         Mood and Affect: Mood normal.         Behavior: Behavior normal.         Thought Content: Thought content normal.         Judgment: Judgment normal.         Assessment & Plan #1 urgent referral to orthopedic back surgeon #2 adjust medications #3 follow-up next week  Problems Addressed this Visit        Musculoskeletal and Injuries    Chronic low back pain with sciatica - Primary    Relevant Medications    gabapentin (NEURONTIN) 300 MG capsule    Other Relevant Orders    Ambulatory Referral to Orthopedic Surgery    Ambulatory Referral to Orthopedic Surgery      Diagnoses     Diagnosis Codes Comments    Chronic low back pain with  sciatica, sciatica laterality unspecified, unspecified back pain laterality    -  Primary ICD-10-CM: M54.40, G89.29  ICD-9-CM: 724.2, 724.3, 338.29

## 2022-06-13 NOTE — TELEPHONE ENCOUNTER
Caller: Horacio Tony OSCAR    Relationship: Self    Best call back number: [phone]    Requested Prescriptions:   Requested Prescriptions     Pending Prescriptions Disp Refills   • methylphenidate (Ritalin) 10 MG tablet 30 tablet 0     Sig: Take 1 tablet by mouth Daily. For ADD 3 months        Pharmacy where request should be sent: [facility] [address] [facility] [address] & ([facility] [phone] [facility] [phone] FX     Additional details provided by patient: PATIENT IS OUT    Does the patient have less than a 3 day supply:  [x] Yes  [] No    Cherelle Taveras Rep   06/13/22 13:18 EDT

## 2022-06-14 RX ORDER — METHYLPHENIDATE HYDROCHLORIDE 10 MG/1
10 TABLET ORAL DAILY
Qty: 30 TABLET | Refills: 0 | Status: SHIPPED | OUTPATIENT
Start: 2022-06-14 | End: 2022-07-27 | Stop reason: SDUPTHER

## 2022-06-27 ENCOUNTER — TELEPHONE (OUTPATIENT)
Dept: PAIN MEDICINE | Facility: CLINIC | Age: 82
End: 2022-06-27

## 2022-06-27 NOTE — TELEPHONE ENCOUNTER
Pt called today requesting a lumbar injection. He is scheduled to see Dr. Bhatt in sept, but wanted to discuss inj sooner. First available appt with an APRN is with you, so I scheduled him with you on Friday 7/1/22. Wanted to let you know. Please advise. Thank you.

## 2022-07-22 ENCOUNTER — OFFICE VISIT (OUTPATIENT)
Dept: ORTHOPEDIC SURGERY | Facility: CLINIC | Age: 82
End: 2022-07-22

## 2022-07-22 VITALS — BODY MASS INDEX: 25.2 KG/M2 | WEIGHT: 180 LBS | TEMPERATURE: 96.9 F | HEIGHT: 71 IN

## 2022-07-22 DIAGNOSIS — M48.061 SPINAL STENOSIS OF LUMBAR REGION, UNSPECIFIED WHETHER NEUROGENIC CLAUDICATION PRESENT: ICD-10-CM

## 2022-07-22 DIAGNOSIS — M41.9 ACQUIRED SCOLIOSIS: Primary | ICD-10-CM

## 2022-07-22 PROCEDURE — 72100 X-RAY EXAM L-S SPINE 2/3 VWS: CPT | Performed by: ORTHOPAEDIC SURGERY

## 2022-07-22 PROCEDURE — 99213 OFFICE O/P EST LOW 20 MIN: CPT | Performed by: ORTHOPAEDIC SURGERY

## 2022-07-22 NOTE — PROGRESS NOTES
He is complaining of increasing pain for last 2 months radiating into the right buttock and right quads.  I have been treating him for a healing sacral alar fracture and he was getting along well enough with that but he is feels like this is different.  Good strength in the lower extremities on examination.  Lumbar x-rays show scoliosis and multilevel spondylosis.  As he has had some surgery in the past by Dr. Aryan Garcia I am going to obtain an MRI scan with gadolinium and I checked his creatinine which has been reasonable and stable.

## 2022-07-27 RX ORDER — METHYLPHENIDATE HYDROCHLORIDE 10 MG/1
10 TABLET ORAL DAILY
Qty: 30 TABLET | Refills: 0 | Status: ON HOLD | OUTPATIENT
Start: 2022-07-27 | End: 2022-08-06 | Stop reason: SDUPTHER

## 2022-07-27 RX ORDER — PANTOPRAZOLE SODIUM 40 MG/1
40 TABLET, DELAYED RELEASE ORAL DAILY
Qty: 90 TABLET | Refills: 3 | Status: SHIPPED | OUTPATIENT
Start: 2022-07-27 | End: 2022-12-19 | Stop reason: SDUPTHER

## 2022-07-27 NOTE — TELEPHONE ENCOUNTER
Caller: Horacio Tony L    Relationship: Self    Best call back number:[phone]  Requested Prescriptions:   Requested Prescriptions     Pending Prescriptions Disp Refills   • methylphenidate (Ritalin) 10 MG tablet 30 tablet 0     Sig: Take 1 tablet by mouth Daily. For ADD 3 months        Pharmacy where request should be sent: [facility] [address] AT [facility] [address] & ([facility] [phone] [facility] [phone] FX     Additional details provided by patient: 2 DAYS LEFT    Does the patient have less than a 3 day supply:  [x] Yes  [] No    Cherelle Jansen Rep   07/27/22 14:02 EDT

## 2022-07-27 NOTE — TELEPHONE ENCOUNTER
Caller: Tony Leonard    Relationship: Self    Requested Prescriptions:   Requested Prescriptions     Pending Prescriptions Disp Refills   • pantoprazole (PROTONIX) 40 MG EC tablet 90 tablet 3     Sig: Take 1 tablet by mouth Daily.        Pharmacy where request should be sent: [address] & ([facility] - [phone] [facility] [phone] FX     Additional details provided by patient: PATIENT STATES HE HAS ZERO PILLS LEFT.     Does the patient have less than a 3 day supply:  [x] Yes  [] No    Cherelle XIAO Rep   07/27/22 14:06 EDT

## 2022-08-01 ENCOUNTER — HOSPITAL ENCOUNTER (INPATIENT)
Facility: HOSPITAL | Age: 82
LOS: 5 days | Discharge: NURSING, REHAB OR LONG-TERM CARE | End: 2022-08-06
Attending: EMERGENCY MEDICINE | Admitting: INTERNAL MEDICINE

## 2022-08-01 DIAGNOSIS — M54.40 CHRONIC LOW BACK PAIN WITH SCIATICA, SCIATICA LATERALITY UNSPECIFIED, UNSPECIFIED BACK PAIN LATERALITY: ICD-10-CM

## 2022-08-01 DIAGNOSIS — M54.42 CHRONIC RIGHT-SIDED LOW BACK PAIN WITH LEFT-SIDED SCIATICA: ICD-10-CM

## 2022-08-01 DIAGNOSIS — M47.816 OSTEOARTHRITIS OF LUMBAR SPINE, UNSPECIFIED SPINAL OSTEOARTHRITIS COMPLICATION STATUS: ICD-10-CM

## 2022-08-01 DIAGNOSIS — M51.26 LUMBAR DISC HERNIATION: ICD-10-CM

## 2022-08-01 DIAGNOSIS — S32.030A COMPRESSION FRACTURE OF L3 LUMBAR VERTEBRA, CLOSED, INITIAL ENCOUNTER: ICD-10-CM

## 2022-08-01 DIAGNOSIS — M54.16 LUMBAR RADICULOPATHY: ICD-10-CM

## 2022-08-01 DIAGNOSIS — G89.29 CHRONIC LOW BACK PAIN WITH SCIATICA, SCIATICA LATERALITY UNSPECIFIED, UNSPECIFIED BACK PAIN LATERALITY: ICD-10-CM

## 2022-08-01 DIAGNOSIS — M41.9 SCOLIOSIS OF LUMBAR SPINE, UNSPECIFIED SCOLIOSIS TYPE: ICD-10-CM

## 2022-08-01 DIAGNOSIS — M79.672 PAIN IN BOTH FEET: ICD-10-CM

## 2022-08-01 DIAGNOSIS — G89.29 CHRONIC RIGHT-SIDED LOW BACK PAIN WITH LEFT-SIDED SCIATICA: ICD-10-CM

## 2022-08-01 DIAGNOSIS — M48.062 SPINAL STENOSIS, LUMBAR REGION WITH NEUROGENIC CLAUDICATION: ICD-10-CM

## 2022-08-01 DIAGNOSIS — M54.41 ACUTE MIDLINE LOW BACK PAIN WITH RIGHT-SIDED SCIATICA: Primary | ICD-10-CM

## 2022-08-01 DIAGNOSIS — M79.671 PAIN IN BOTH FEET: ICD-10-CM

## 2022-08-01 DIAGNOSIS — Z98.890 HISTORY OF BACK SURGERY: ICD-10-CM

## 2022-08-01 LAB — SARS-COV-2 RNA PNL SPEC NAA+PROBE: NOT DETECTED

## 2022-08-01 PROCEDURE — 94799 UNLISTED PULMONARY SVC/PX: CPT

## 2022-08-01 PROCEDURE — 94640 AIRWAY INHALATION TREATMENT: CPT

## 2022-08-01 PROCEDURE — 99284 EMERGENCY DEPT VISIT MOD MDM: CPT

## 2022-08-01 PROCEDURE — 87635 SARS-COV-2 COVID-19 AMP PRB: CPT | Performed by: PHYSICIAN ASSISTANT

## 2022-08-01 RX ORDER — IPRATROPIUM BROMIDE AND ALBUTEROL SULFATE 2.5; .5 MG/3ML; MG/3ML
3 SOLUTION RESPIRATORY (INHALATION)
Refills: 3 | Status: DISCONTINUED | OUTPATIENT
Start: 2022-08-01 | End: 2022-08-06 | Stop reason: HOSPADM

## 2022-08-01 RX ORDER — POLYETHYLENE GLYCOL 3350 17 G/17G
17 POWDER, FOR SOLUTION ORAL DAILY
Status: DISCONTINUED | OUTPATIENT
Start: 2022-08-02 | End: 2022-08-06 | Stop reason: HOSPADM

## 2022-08-01 RX ORDER — ROPINIROLE 0.5 MG/1
0.5 TABLET, FILM COATED ORAL NIGHTLY
Status: DISCONTINUED | OUTPATIENT
Start: 2022-08-01 | End: 2022-08-06 | Stop reason: HOSPADM

## 2022-08-01 RX ORDER — GABAPENTIN 300 MG/1
300 CAPSULE ORAL 3 TIMES DAILY
Status: DISCONTINUED | OUTPATIENT
Start: 2022-08-01 | End: 2022-08-06 | Stop reason: HOSPADM

## 2022-08-01 RX ORDER — ACETAMINOPHEN 325 MG/1
650 TABLET ORAL EVERY 4 HOURS PRN
Status: DISCONTINUED | OUTPATIENT
Start: 2022-08-01 | End: 2022-08-06 | Stop reason: HOSPADM

## 2022-08-01 RX ORDER — ACETAMINOPHEN 160 MG/5ML
650 SOLUTION ORAL EVERY 4 HOURS PRN
Status: DISCONTINUED | OUTPATIENT
Start: 2022-08-01 | End: 2022-08-06 | Stop reason: HOSPADM

## 2022-08-01 RX ORDER — ALBUTEROL SULFATE 2.5 MG/3ML
2.5 SOLUTION RESPIRATORY (INHALATION) EVERY 6 HOURS PRN
Status: DISCONTINUED | OUTPATIENT
Start: 2022-08-01 | End: 2022-08-06 | Stop reason: HOSPADM

## 2022-08-01 RX ORDER — HYDROCODONE BITARTRATE AND ACETAMINOPHEN 5; 325 MG/1; MG/1
1 TABLET ORAL EVERY 6 HOURS PRN
Status: DISCONTINUED | OUTPATIENT
Start: 2022-08-01 | End: 2022-08-01 | Stop reason: SDUPTHER

## 2022-08-01 RX ORDER — MORPHINE SULFATE 2 MG/ML
4 INJECTION, SOLUTION INTRAMUSCULAR; INTRAVENOUS EVERY 4 HOURS PRN
Status: DISCONTINUED | OUTPATIENT
Start: 2022-08-01 | End: 2022-08-06 | Stop reason: HOSPADM

## 2022-08-01 RX ORDER — BUDESONIDE 0.5 MG/2ML
0.5 INHALANT ORAL 2 TIMES DAILY
Status: DISCONTINUED | OUTPATIENT
Start: 2022-08-01 | End: 2022-08-06 | Stop reason: HOSPADM

## 2022-08-01 RX ORDER — CHOLECALCIFEROL (VITAMIN D3) 125 MCG
500 CAPSULE ORAL DAILY
Status: DISCONTINUED | OUTPATIENT
Start: 2022-08-02 | End: 2022-08-06 | Stop reason: HOSPADM

## 2022-08-01 RX ORDER — FLUOXETINE HYDROCHLORIDE 20 MG/1
20 CAPSULE ORAL DAILY
Status: DISCONTINUED | OUTPATIENT
Start: 2022-08-02 | End: 2022-08-06 | Stop reason: HOSPADM

## 2022-08-01 RX ORDER — ONDANSETRON 2 MG/ML
4 INJECTION INTRAMUSCULAR; INTRAVENOUS EVERY 6 HOURS PRN
Status: DISCONTINUED | OUTPATIENT
Start: 2022-08-01 | End: 2022-08-06 | Stop reason: HOSPADM

## 2022-08-01 RX ORDER — POTASSIUM CHLORIDE 750 MG/1
20 TABLET, FILM COATED, EXTENDED RELEASE ORAL DAILY
Status: DISCONTINUED | OUTPATIENT
Start: 2022-08-02 | End: 2022-08-06 | Stop reason: HOSPADM

## 2022-08-01 RX ORDER — PANTOPRAZOLE SODIUM 40 MG/1
40 TABLET, DELAYED RELEASE ORAL EVERY MORNING
Status: DISCONTINUED | OUTPATIENT
Start: 2022-08-02 | End: 2022-08-06 | Stop reason: HOSPADM

## 2022-08-01 RX ORDER — ACETAMINOPHEN 650 MG/1
650 SUPPOSITORY RECTAL EVERY 4 HOURS PRN
Status: DISCONTINUED | OUTPATIENT
Start: 2022-08-01 | End: 2022-08-06 | Stop reason: HOSPADM

## 2022-08-01 RX ORDER — FUROSEMIDE 40 MG/1
40 TABLET ORAL DAILY
Status: DISCONTINUED | OUTPATIENT
Start: 2022-08-02 | End: 2022-08-06 | Stop reason: HOSPADM

## 2022-08-01 RX ORDER — MULTIPLE VITAMINS W/ MINERALS TAB 9MG-400MCG
1 TAB ORAL DAILY
Status: DISCONTINUED | OUTPATIENT
Start: 2022-08-02 | End: 2022-08-06 | Stop reason: HOSPADM

## 2022-08-01 RX ORDER — HYDROCODONE BITARTRATE AND ACETAMINOPHEN 7.5; 325 MG/1; MG/1
1 TABLET ORAL EVERY 6 HOURS PRN
Status: DISCONTINUED | OUTPATIENT
Start: 2022-08-01 | End: 2022-08-06 | Stop reason: HOSPADM

## 2022-08-01 RX ORDER — CALCIUM CARBONATE 200(500)MG
1 TABLET,CHEWABLE ORAL AS NEEDED
Status: DISCONTINUED | OUTPATIENT
Start: 2022-08-01 | End: 2022-08-06 | Stop reason: HOSPADM

## 2022-08-01 RX ORDER — METHYLPHENIDATE HYDROCHLORIDE 10 MG/1
10 TABLET ORAL DAILY
Status: DISCONTINUED | OUTPATIENT
Start: 2022-08-02 | End: 2022-08-06 | Stop reason: HOSPADM

## 2022-08-01 RX ADMIN — BUDESONIDE 0.5 MG: 0.5 INHALANT ORAL at 23:23

## 2022-08-01 RX ADMIN — ROPINIROLE HYDROCHLORIDE 0.5 MG: 0.5 TABLET, FILM COATED ORAL at 23:10

## 2022-08-01 RX ADMIN — IPRATROPIUM BROMIDE AND ALBUTEROL SULFATE 3 ML: .5; 3 SOLUTION RESPIRATORY (INHALATION) at 23:24

## 2022-08-01 RX ADMIN — HYDROCODONE BITARTRATE AND ACETAMINOPHEN 1 TABLET: 5; 325 TABLET ORAL at 15:12

## 2022-08-01 RX ADMIN — GABAPENTIN 300 MG: 300 CAPSULE ORAL at 23:10

## 2022-08-01 RX ADMIN — HYDROCODONE BITARTRATE AND ACETAMINOPHEN 1 TABLET: 7.5; 325 TABLET ORAL at 23:10

## 2022-08-01 NOTE — ED NOTES
Nursing report ED to floor  Tony Leonard  82 y.o.  male    HPI :   Chief Complaint   Patient presents with   • Back Pain       Admitting doctor:   Elizabet Parra MD    Admitting diagnosis:   The primary encounter diagnosis was Acute midline low back pain with right-sided sciatica. Diagnoses of Compression fracture of L3 lumbar vertebra, closed, initial encounter (HCC) and Lumbar disc herniation were also pertinent to this visit.    Code status:   Current Code Status     Date Active Code Status Order ID Comments User Context       8/1/2022 1757 CPR (Attempt to Resuscitate) [id number]  Elizabet Parra MD ED     Advance Care Planning Activity      Questions for Current Code Status     Question Answer    Code Status (Patient has no pulse and is not breathing) CPR (Attempt to Resuscitate)    Medical Interventions (Patient has pulse or is breathing) Full Support          Allergies:   Daliresp [roflumilast], Latex, and Sulfa antibiotics    Intake and Output    Intake/Output Summary (Last 24 hours) at 8/1/2022 1838  Last data filed at 8/1/2022 1532  Gross per 24 hour   Intake --   Output 125 ml   Net -125 ml       Weight:   There were no vitals filed for this visit.    Most recent vitals:   Vitals:    08/01/22 1431 08/01/22 1748 08/01/22 1750 08/01/22 1801   BP: 150/71   161/66   Pulse: 55 51     Resp:       Temp:       SpO2:  98% 96%        Active LDAs/IV Access:   Lines, Drains & Airways     Active LDAs     Name Placement date Placement time Site Days    Peripheral IV 08/01/22 1713 Left Hand 08/01/22  1713  Hand  less than 1                Labs (abnormal labs have a star):   Labs Reviewed   COVID-19,[facility] CEPHEID/[facility]ON, NP SWAB IN TRANSPORT MEDIA 8-12 HR TAT - Normal    Narrative:     Fact sheet for providers: https://www.fda.gov/media/817263/download    Fact sheet for patients: https://www.fda.gov/media/765651/download    Test performed by PCR.   COVID PRE-OP / PRE-PROCEDURE SCREENING ORDER (NO  ISOLATION)    Narrative:     The following orders were created for panel order COVID PRE-OP / PRE-PROCEDURE SCREENING ORDER (NO ISOLATION) - Swab, Nasopharynx.  Procedure                               Abnormality         Status                     ---------                               -----------         ------                     COVID-19, [facility]-HOUSE...[[id number]]  Normal              Final result                 Please view results for these tests on the individual orders.       EKG:   No orders to display       Meds given in ED:   Medications   HYDROcodone-acetaminophen (NORCO) 5-325 MG per tablet 1 tablet (1 tablet Oral Given 8/1/22 1512)   acetaminophen (TYLENOL) tablet 650 mg (has no administration in time range)     Or   acetaminophen (TYLENOL) 160 MG/5ML solution 650 mg (has no administration in time range)     Or   acetaminophen (TYLENOL) suppository 650 mg (has no administration in time range)   ondansetron (ZOFRAN) injection 4 mg (has no administration in time range)       Imaging results:  No radiology results for the last day    Ambulatory status:   - standby w/ walker     Social issues:   Social History     Socioeconomic History   • Marital status: Single   Tobacco Use   • Smoking status: Never Smoker   • Smokeless tobacco: Never Used   Vaping Use   • Vaping Use: Never used   Substance and Sexual Activity   • Alcohol use: No     Comment: red wine occassional   • Drug use: No   • Sexual activity: Defer       NIH Stroke Scale:        Nursing report ED to floor:

## 2022-08-01 NOTE — ED TRIAGE NOTES
Patient to ER via EMS from home for low back pain for a few weeks. Patient has no known injury states that he was just worse this morning. Patient was able to walk with a walker with EMS    Patient wearing mask this RN in PPE

## 2022-08-01 NOTE — ED PROVIDER NOTES
EMERGENCY DEPARTMENT ENCOUNTER    Room Number:  P576/1  Date seen:  8/1/2022  Time seen: 12:26 EDT  PCP: Erich Aviles MD  Historian: patient      HPI:  Chief Complaint: back pain    Context: Tony Leonard is a 82 y.o. male who presents to the ED for evaluation of back pain.  Patient states he has been experiencing low back pain radiating into his right hip and down his right leg for approximately 6 weeks that is gotten progressively worse.  He has seen Dr. Gordon for this recently (7/22/22) who ordered an MRI.  He has not yet seen the results of this.  Patient stays at [facility] and typically gets around on his own with a walker.  He states he was unable to get out of his bed today.  He complains of intermittent numbness in his legs but no current numbness.  He denies any saddle anesthesia.  He does admit to accidentally urinating on himself but this is typically because he cannot get to the restroom in time due to his pain and immobility.      PAST MEDICAL HISTORY  Active Ambulatory Problems     Diagnosis Date Noted   • Thrush, oral 03/11/2016   • ADD (attention deficit disorder) 03/11/2016   • Hemorrhoids 03/11/2016   • Bronchiectasis with acute lower respiratory infection (HCC) 03/11/2016   • Diverticul disease small and large intestine, no perforati or abscess 03/11/2016   • Benign non-nodular prostatic hyperplasia without lower urinary tract symptoms 03/11/2016   • Gastroesophageal reflux disease 03/11/2016   • Malaise and fatigue 03/11/2016   • Environmental allergies 04/25/2016   • Hemoptysis 04/27/2016   • Dyslipidemia 05/11/2016   • Primary osteoarthritis involving multiple joints 05/11/2016   • Pneumonia of right lower lobe due to infectious organism 10/03/2016   • Abnormal EKG 10/04/2016   • COPD (chronic obstructive pulmonary disease) ([facility]) 10/04/2016   • Mild malnutrition ([facility]) 03/28/2017   • Acute on chronic respiratory failure with hypoxia ([facility]) 04/27/2017   • Fracture, intertrochanteric,  right femur, closed, initial encounter ([facility]) 01/16/2018   • Hematoma of frontal scalp 01/16/2018   • Postoperative anemia due to acute blood loss 01/19/2018   • Urinary retention 01/25/2018   • Hemorrhagic disorder due to circulating anticoagulants ([facility]) 01/29/2018   • History of fracture of right hip 02/22/2018   • Closed fracture of right hip with routine healing 02/28/2018   • Chronic low back pain with sciatica 06/21/2018   • Change in bowel function 04/18/2019   • Rectal bleeding 04/18/2019   • Prostate cancer ([facility]) 04/22/2019   • On home oxygen therapy 09/24/2019   • Acute viral bronchitis 12/29/2019   • Acute on chronic systolic (congestive) heart failure ([facility]) 10/14/2020   • Peripheral neuropathy 07/01/2021   • Plantar fasciitis 09/08/2021   • COPD exacerbation ([facility]) 05/07/2022   • Bilateral lower extremity edema 05/07/2022   • Microscopic hematuria 05/08/2022     Resolved Ambulatory Problems     Diagnosis Date Noted   • Pneumonia of both lower lobes due to infectious organism 03/11/2016   • Pneumonia of right upper lobe due to infectious organism 04/22/2016   • COPD exacerbation ([facility]) 04/25/2016   • GERD (gastroesophageal reflux disease) 04/25/2016   • Chronic respiratory failure with hypoxia ([facility]) 10/04/2016   • Bacterial lobar pneumonia 12/27/2016   • Pneumonia of left lower lobe due to infectious organism 03/26/2017   • Bronchitis 06/01/2017   • COPD exacerbation ([facility]) 06/17/2017   • Chronic diastolic CHF (congestive heart failure) ([facility]) 01/16/2018   • Leukocytosis 01/16/2018   • Right upper lobe pneumonia 01/20/2018   • Mucus plugging of bronchi 01/16/2018   • COPD exacerbation ([facility]) 04/16/2018   • Degenerative joint disease (DJD) of hip 09/23/2019   • Bronchiectasis with (acute) exacerbation ([facility]) 12/28/2019   • Primary hypertension 05/06/2022     Past Medical History:   Diagnosis Date   • ADHD (attention deficit hyperactivity disorder)    • Bronchiectasis ([facility])    • Colon polyp    • Diverticulosis     • Hard of hearing    • Pneumonia          PAST SURGICAL HISTORY  Past Surgical History:   Procedure Laterality Date   • BRONCHOSCOPY N/A 4/30/2016    Procedure: BRONCHOSCOPY with BAL of right lower lobe and left  lower lobe.  ;  Surgeon: Nando Diaz MD;  Location: [facility] ENDOSCOPY;  Service:    • BRONCHOSCOPY N/A 4/28/2017    Procedure: BRONCHOSCOPY;  Surgeon: Katharina Salter MD;  Location: [facility] ENDOSCOPY;  Service:    • BRONCHOSCOPY Bilateral 6/29/2017    Procedure: BRONCHOSCOPY WITH WASHINGS;  Surgeon: Katharina Salter MD;  Location: [facility] ENDOSCOPY;  Service:    • BRONCHOSCOPY N/A 1/22/2018    Procedure: BRONCHOSCOPY AT BEDSIDE with BAL;  Surgeon: Katharina Salter MD;  Location: [facility] ENDOSCOPY;  Service:    • BRONCHOSCOPY N/A 1/30/2018    Procedure: BRONCHOSCOPY with BAL and washing;  Surgeon: Shreyas Wild MD;  Location: [facility] ENDOSCOPY;  Service:    • BRONCHOSCOPY Bilateral 4/24/2018    Procedure: BRONCHOSCOPY WITH WASHING;  Surgeon: Katharina Salter MD;  Location: [facility] ENDOSCOPY;  Service: Pulmonary   • BRONCHOSCOPY N/A 12/28/2019    Procedure: BRONCHOSCOPY with bilateral washing;  Surgeon: Katharina Salter MD;  Location: [facility] ENDOSCOPY;  Service: Pulmonary   • COLONOSCOPY  05/17/2013    eh, ih, tort, sig tics   • COLONOSCOPY N/A 5/10/2019    Non-thrombosed external hemorrhoids found on perianal exam, Diverticulosis, Tortuous colon, One 5 mm polyp in the mid ascending colon, IH. Path: Tubular adenoma.    • ENDOSCOPY  03/19/2015    z line irreg, hh   • HIP OPEN REDUCTION Right 1/17/2018    Procedure: HIP OPEN REDUCTION INTERNAL FIXATION WITH DYNAMIC HIP SCREW;  Surgeon: Les Black MD;  Location: [facility] MAIN OR;  Service:    • SPINE SURGERY     • TONSILLECTOMY     • TOTAL HIP ARTHROPLASTY REVISION Right 9/23/2019    Procedure: HIP  REVISION RIGHT;  Surgeon: Isauro Warner MD;  Location: [facility] MAIN OR;  Service: Orthopedics         FAMILY HISTORY  Family History   Problem Relation Age of Onset    • Thyroid disease Mother    • No Known Problems Father    • Malig Hyperthermia Neg Hx          SOCIAL HISTORY  Social History     Socioeconomic History   • Marital status: Single   Tobacco Use   • Smoking status: Never Smoker   • Smokeless tobacco: Never Used   Vaping Use   • Vaping Use: Never used   Substance and Sexual Activity   • Alcohol use: No     Comment: red wine occassional   • Drug use: No   • Sexual activity: Defer         ALLERGIES  Daliresp [roflumilast], Latex, and Sulfa antibiotics        REVIEW OF SYSTEMS  Review of Systems   Constitutional: Negative for chills and fever.   Respiratory: Negative for shortness of breath.    Cardiovascular: Negative for chest pain.   Gastrointestinal: Negative for abdominal pain, nausea and vomiting.   Genitourinary: Negative for dysuria and flank pain.   Musculoskeletal: Positive for back pain. Negative for neck pain.   Skin: Negative for color change.   Neurological: Positive for weakness and numbness (intermittently).   Psychiatric/Behavioral: Negative for confusion.        All systems reviewed and negative except for those discussed in HPI.       PHYSICAL EXAM  ED Triage Vitals [08/01/22 1142]   Temp Heart Rate Resp BP SpO2   97.8 °F (36.6 °C) 61 16 (!) 87/46 95 %      Temp src Heart Rate Source Patient Position BP Location FiO2 (%)   -- -- -- -- --         GENERAL: not distressed  HENT: atraumatic  EYES: no scleral icterus  CV: regular rhythm, regular rate  RESPIRATORY: normal effort  ABDOMEN: soft, nontender  MUSCULOSKELETAL: no deformity.  3/5 strength bilateral lower extremities.  Normal sensation to light touch bilateral lower extremities.  Patient unable to get out of bed on his own.  Hard to assess reflexes this is difficult to get patient in a position that is comfortable.  Tender midline lumbar spine and right SI joint.  NEURO: alert, moves all extremities, follows commands.  Negative straight leg raise bilaterally.  SKIN: warm, dry    Vital signs and  nursing notes reviewed.          LAB RESULTS  Recent Results (from the past 24 hour(s))   COVID-19,[facility] CEPHEID/[facility] NP SWAB IN TRANSPORT MEDIA 8-12 HR TAT - Swab, Nasopharynx    Collection Time: 08/01/22  4:33 PM    Specimen: Nasopharynx; Swab   Result Value Ref Range    COVID19 Not Detected Not Detected - Ref. Range       Ordered the above labs and independently reviewed the results.        RADIOLOGY  XR Spine Lumbar AP & Lateral    Result Date: 7/22/2022  Impression: Ordering physician's impression is located in the Encounter Note dated 07/22/22. X-ray performed in the DR room.       I ordered the above noted radiological studies. Reviewed by me and discussed with radiologist.  See dictation for official radiology interpretation.    MEDICATIONS GIVEN IN ER  Medications   HYDROcodone-acetaminophen (NORCO) 5-325 MG per tablet 1 tablet (1 tablet Oral Given 8/1/22 1512)   acetaminophen (TYLENOL) tablet 650 mg (has no administration in time range)     Or   acetaminophen (TYLENOL) 160 MG/5ML solution 650 mg (has no administration in time range)     Or   acetaminophen (TYLENOL) suppository 650 mg (has no administration in time range)   ondansetron (ZOFRAN) injection 4 mg (has no administration in time range)       MEDICAL RECORD REVIEW  I reviewed the patient's records. MRI results scanned in from 7/26/2022 at [facility] imaging shows L1 and L3 compression fracture along with disc herniation at L5-S1      PROGRESS, DATA ANALYSIS, CONSULTS, AND MEDICAL DECISION MAKING    All labs have been independently reviewed by me.  All radiology studies have been reviewed by me. Discussion below represents my analysis of pertinent findings related to patient's condition, differential diagnosis, treatment plan and final disposition.    DDX includes but not limited to compression fracture, lumbar radiculopathy, disc herniation, back strain.      ED Course as of 08/01/22 2025   Mon Aug 01, 2022   1323 I am paging Dr. Gordon  that the patient has seen outpatient for his back pain. [AH]   1453 We have had had no return call from Dr. Gordon.   [AH]   1515 Given patient's increased pain and inability to get out of bed and care for himself we believe it is reasonable to bring the patient in for admission for further evaluation by either Ortho or neurosurgery.  MRI from an outside facility did confirm compression fractures at L1 and L3 along with disc herniation. [AH]   1542 Discussed the patient with Dr. Parra who agrees to admit.  We are still attempting to reach Dr. Gordon at this time. [AH]      ED Course User Index  [AH] Kirsten Monroe PA           Reviewed pt's history and workup with Dr. Haider.  After a bedside evaluation; they agree with the plan of care      Patient's disposition is admission.    Patient was placed in face mask in first look. Patient was wearing facemask each time I entered the room and throughout our encounter. I wore full protective equipment throughout this patient encounter including a face mask, eye shield, gown and gloves. Hand hygiene was performed before donning protective equipment and after removal when leaving the room.        DIAGNOSIS  Final diagnoses:   Acute midline low back pain with right-sided sciatica   Compression fracture of L3 lumbar vertebra, closed, initial encounter (HCC)   Lumbar disc herniation           Latest Documented Vital Signs:  As of 20:25 EDT  BP- 168/83 HR- 86 Temp- 98.1 °F (36.7 °C) (Oral) O2 sat- 97%         Kirsten Monroe PA  08/01/22 2027

## 2022-08-01 NOTE — ED PROVIDER NOTES
MD ATTESTATION NOTE    The GUILHERME and I have discussed this patient's history, physical exam, and treatment plan.  I have reviewed the documentation and personally had a face to face interaction with the patient. I affirm the documentation and agree with the treatment and plan.  The attached note describes my personal findings.      I provided a substantive portion of the care of the patient.  I personally performed the physical exam in its entirety, and below are my findings.  For this patient encounter, the patient wore surgical mask, I wore full protective PPE including N95 and eye protection.      Brief HPI: Patient complains of low back pain for the past 6 weeks.  Pain radiates into the right thigh.  Reports intermittent tingling in his right leg but denies leg weakness.  He has had increased difficulty ambulating secondary to pain.  Patient lives independently.  He recently saw Dr. Gordon and had an MRI done on 7/26/2022.    PHYSICAL EXAM  ED Triage Vitals [08/01/22 1142]   Temp Heart Rate Resp BP SpO2   97.8 °F (36.6 °C) 61 16 (!) 87/46 95 %      Temp src Heart Rate Source Patient Position BP Location FiO2 (%)   -- -- -- -- --         GENERAL: Awake, alert, oriented x3.  Elderly male.  No acute distress  HENT: nares patent  EYES: no scleral icterus  CV: regular rhythm, normal rate  RESPIRATORY: normal effort, clear to auscultation bilaterally  ABDOMEN: soft nontender  MUSCULOSKELETAL: There is tenderness of the lumbar spine.  There is 1+ pedal edema bilaterally.  No calf tenderness.  NEURO: There is normal strength and light touch sensation in both lower extremities.  No saddle anesthesia.  PSYCH:  calm, cooperative  SKIN: warm, dry    Vital signs and nursing notes reviewed.    Old records reviewed.  MRI done on 7/26/2022 showed an acute compression fracture of L3.  There was a broad base disc herniation at L5/S1 with the placement of the left S1 nerve root..  There is mild to moderate spinal canal  stenosis.    Plan: Case will be discussed with Dr. Gordon.    Patient will be admitted to the hospitalist for an acute compression fracture of L3 and intractable low back pain.     Davian Haider MD  08/01/22 [id number]

## 2022-08-02 ENCOUNTER — APPOINTMENT (OUTPATIENT)
Dept: GENERAL RADIOLOGY | Facility: HOSPITAL | Age: 82
End: 2022-08-02

## 2022-08-02 ENCOUNTER — ANESTHESIA (OUTPATIENT)
Dept: PAIN MEDICINE | Facility: HOSPITAL | Age: 82
End: 2022-08-02

## 2022-08-02 ENCOUNTER — ANESTHESIA EVENT (OUTPATIENT)
Dept: PAIN MEDICINE | Facility: HOSPITAL | Age: 82
End: 2022-08-02

## 2022-08-02 ENCOUNTER — APPOINTMENT (OUTPATIENT)
Dept: PAIN MEDICINE | Facility: HOSPITAL | Age: 82
End: 2022-08-02

## 2022-08-02 PROBLEM — S32.010A COMPRESSION FRACTURE OF L1 VERTEBRA: Status: ACTIVE | Noted: 2022-08-02

## 2022-08-02 PROBLEM — M48.062 SPINAL STENOSIS, LUMBAR REGION WITH NEUROGENIC CLAUDICATION: Status: ACTIVE | Noted: 2022-08-02

## 2022-08-02 PROBLEM — M43.9 COMPRESSION DEFORMITY OF VERTEBRA: Status: ACTIVE | Noted: 2022-08-02

## 2022-08-02 PROBLEM — I50.32 CHRONIC DIASTOLIC (CONGESTIVE) HEART FAILURE: Status: ACTIVE | Noted: 2020-10-14

## 2022-08-02 LAB
ANION GAP SERPL CALCULATED.3IONS-SCNC: 9.2 MMOL/L (ref 5–15)
BASOPHILS # BLD AUTO: 0.04 10*3/MM3 (ref 0–0.2)
BASOPHILS NFR BLD AUTO: 0.6 % (ref 0–1.5)
BUN SERPL-MCNC: 23 MG/DL (ref 8–23)
BUN/CREAT SERPL: 29.9 (ref 7–25)
CALCIUM SPEC-SCNC: 8.7 MG/DL (ref 8.6–10.5)
CHLORIDE SERPL-SCNC: 105 MMOL/L (ref 98–107)
CO2 SERPL-SCNC: 24.8 MMOL/L (ref 22–29)
CREAT SERPL-MCNC: 0.77 MG/DL (ref 0.76–1.27)
DEPRECATED RDW RBC AUTO: 43.8 FL (ref 37–54)
EGFRCR SERPLBLD CKD-EPI 2021: 88.3 ML/MIN/1.73
EOSINOPHIL # BLD AUTO: 0.85 10*3/MM3 (ref 0–0.4)
EOSINOPHIL NFR BLD AUTO: 12.2 % (ref 0.3–6.2)
ERYTHROCYTE [DISTWIDTH] IN BLOOD BY AUTOMATED COUNT: 12.6 % (ref 12.3–15.4)
GLUCOSE SERPL-MCNC: 94 MG/DL (ref 65–99)
HCT VFR BLD AUTO: 34.2 % (ref 37.5–51)
HGB BLD-MCNC: 11.5 G/DL (ref 13–17.7)
IMM GRANULOCYTES # BLD AUTO: 0.02 10*3/MM3 (ref 0–0.05)
IMM GRANULOCYTES NFR BLD AUTO: 0.3 % (ref 0–0.5)
LYMPHOCYTES # BLD AUTO: 1.4 10*3/MM3 (ref 0.7–3.1)
LYMPHOCYTES NFR BLD AUTO: 20.2 % (ref 19.6–45.3)
MCH RBC QN AUTO: 32.1 PG (ref 26.6–33)
MCHC RBC AUTO-ENTMCNC: 33.6 G/DL (ref 31.5–35.7)
MCV RBC AUTO: 95.5 FL (ref 79–97)
MONOCYTES # BLD AUTO: 0.64 10*3/MM3 (ref 0.1–0.9)
MONOCYTES NFR BLD AUTO: 9.2 % (ref 5–12)
NEUTROPHILS NFR BLD AUTO: 3.99 10*3/MM3 (ref 1.7–7)
NEUTROPHILS NFR BLD AUTO: 57.5 % (ref 42.7–76)
NRBC BLD AUTO-RTO: 0 /100 WBC (ref 0–0.2)
PLATELET # BLD AUTO: 215 10*3/MM3 (ref 140–450)
PMV BLD AUTO: 9.4 FL (ref 6–12)
POTASSIUM SERPL-SCNC: 4.1 MMOL/L (ref 3.5–5.2)
RBC # BLD AUTO: 3.58 10*6/MM3 (ref 4.14–5.8)
SODIUM SERPL-SCNC: 139 MMOL/L (ref 136–145)
WBC NRBC COR # BLD: 6.94 10*3/MM3 (ref 3.4–10.8)

## 2022-08-02 PROCEDURE — 0 IOPAMIDOL 41 % SOLUTION: Performed by: ANESTHESIOLOGY

## 2022-08-02 PROCEDURE — 94799 UNLISTED PULMONARY SVC/PX: CPT

## 2022-08-02 PROCEDURE — 3E0R3BZ INTRODUCTION OF ANESTHETIC AGENT INTO SPINAL CANAL, PERCUTANEOUS APPROACH: ICD-10-PCS | Performed by: ANESTHESIOLOGY

## 2022-08-02 PROCEDURE — 80048 BASIC METABOLIC PNL TOTAL CA: CPT | Performed by: INTERNAL MEDICINE

## 2022-08-02 PROCEDURE — L0464 TLSO 4MOD SACRO-SCAP PRE: HCPCS

## 2022-08-02 PROCEDURE — 36415 COLL VENOUS BLD VENIPUNCTURE: CPT | Performed by: INTERNAL MEDICINE

## 2022-08-02 PROCEDURE — 77003 FLUOROGUIDE FOR SPINE INJECT: CPT

## 2022-08-02 PROCEDURE — 85025 COMPLETE CBC W/AUTO DIFF WBC: CPT | Performed by: INTERNAL MEDICINE

## 2022-08-02 PROCEDURE — 25010000002 MIDAZOLAM PER 1 MG: Performed by: ANESTHESIOLOGY

## 2022-08-02 PROCEDURE — 25010000002 METHYLPREDNISOLONE PER 80 MG: Performed by: ANESTHESIOLOGY

## 2022-08-02 PROCEDURE — 94664 DEMO&/EVAL PT USE INHALER: CPT

## 2022-08-02 PROCEDURE — 99221 1ST HOSP IP/OBS SF/LOW 40: CPT | Performed by: NURSE PRACTITIONER

## 2022-08-02 RX ORDER — MIDAZOLAM HYDROCHLORIDE 1 MG/ML
1 INJECTION INTRAMUSCULAR; INTRAVENOUS AS NEEDED
Status: DISCONTINUED | OUTPATIENT
Start: 2022-08-02 | End: 2022-08-06 | Stop reason: HOSPADM

## 2022-08-02 RX ORDER — LIDOCAINE HYDROCHLORIDE 10 MG/ML
1 INJECTION, SOLUTION INFILTRATION; PERINEURAL ONCE AS NEEDED
Status: DISCONTINUED | OUTPATIENT
Start: 2022-08-02 | End: 2022-08-06 | Stop reason: HOSPADM

## 2022-08-02 RX ORDER — CYCLOBENZAPRINE HCL 10 MG
10 TABLET ORAL 3 TIMES DAILY PRN
Status: DISCONTINUED | OUTPATIENT
Start: 2022-08-02 | End: 2022-08-06 | Stop reason: HOSPADM

## 2022-08-02 RX ORDER — FENTANYL CITRATE 50 UG/ML
50 INJECTION, SOLUTION INTRAMUSCULAR; INTRAVENOUS AS NEEDED
Status: DISCONTINUED | OUTPATIENT
Start: 2022-08-02 | End: 2022-08-06 | Stop reason: HOSPADM

## 2022-08-02 RX ORDER — SODIUM CHLORIDE 0.9 % (FLUSH) 0.9 %
1-10 SYRINGE (ML) INJECTION AS NEEDED
Status: DISCONTINUED | OUTPATIENT
Start: 2022-08-02 | End: 2022-08-06 | Stop reason: HOSPADM

## 2022-08-02 RX ORDER — METHYLPREDNISOLONE ACETATE 80 MG/ML
80 INJECTION, SUSPENSION INTRA-ARTICULAR; INTRALESIONAL; INTRAMUSCULAR; SOFT TISSUE ONCE
Status: COMPLETED | OUTPATIENT
Start: 2022-08-02 | End: 2022-08-02

## 2022-08-02 RX ADMIN — IPRATROPIUM BROMIDE AND ALBUTEROL SULFATE 3 ML: .5; 3 SOLUTION RESPIRATORY (INHALATION) at 23:47

## 2022-08-02 RX ADMIN — Medication 500 MCG: at 08:13

## 2022-08-02 RX ADMIN — GABAPENTIN 300 MG: 300 CAPSULE ORAL at 08:14

## 2022-08-02 RX ADMIN — IPRATROPIUM BROMIDE AND ALBUTEROL SULFATE 3 ML: .5; 3 SOLUTION RESPIRATORY (INHALATION) at 15:17

## 2022-08-02 RX ADMIN — PANTOPRAZOLE SODIUM 40 MG: 40 TABLET, DELAYED RELEASE ORAL at 08:13

## 2022-08-02 RX ADMIN — BUDESONIDE 0.5 MG: 0.5 INHALANT ORAL at 07:02

## 2022-08-02 RX ADMIN — MIDAZOLAM 1 MG: 1 INJECTION INTRAMUSCULAR; INTRAVENOUS at 13:13

## 2022-08-02 RX ADMIN — METHYLPHENIDATE HYDROCHLORIDE 10 MG: 10 TABLET ORAL at 09:28

## 2022-08-02 RX ADMIN — CYCLOBENZAPRINE 10 MG: 10 TABLET, FILM COATED ORAL at 16:30

## 2022-08-02 RX ADMIN — IPRATROPIUM BROMIDE AND ALBUTEROL SULFATE 3 ML: .5; 3 SOLUTION RESPIRATORY (INHALATION) at 07:02

## 2022-08-02 RX ADMIN — METHYLPREDNISOLONE ACETATE 80 MG: 80 INJECTION, SUSPENSION INTRA-ARTICULAR; INTRALESIONAL; INTRAMUSCULAR; SOFT TISSUE at 13:21

## 2022-08-02 RX ADMIN — POLYETHYLENE GLYCOL 3350 17 G: 17 POWDER, FOR SOLUTION ORAL at 08:11

## 2022-08-02 RX ADMIN — HYDROCODONE BITARTRATE AND ACETAMINOPHEN 1 TABLET: 7.5; 325 TABLET ORAL at 17:23

## 2022-08-02 RX ADMIN — MULTIPLE VITAMINS W/ MINERALS TAB 1 TABLET: TAB at 08:14

## 2022-08-02 RX ADMIN — IOPAMIDOL 10 ML: 408 INJECTION, SOLUTION INTRATHECAL at 13:20

## 2022-08-02 RX ADMIN — POTASSIUM CHLORIDE 20 MEQ: 10 TABLET, EXTENDED RELEASE ORAL at 08:14

## 2022-08-02 RX ADMIN — BUDESONIDE 0.5 MG: 0.5 INHALANT ORAL at 23:48

## 2022-08-02 RX ADMIN — GABAPENTIN 300 MG: 300 CAPSULE ORAL at 20:42

## 2022-08-02 RX ADMIN — ROPINIROLE HYDROCHLORIDE 0.5 MG: 0.5 TABLET, FILM COATED ORAL at 20:42

## 2022-08-02 RX ADMIN — FLUOXETINE HYDROCHLORIDE 20 MG: 20 CAPSULE ORAL at 08:11

## 2022-08-02 RX ADMIN — GABAPENTIN 300 MG: 300 CAPSULE ORAL at 16:29

## 2022-08-02 RX ADMIN — MIRABEGRON 50 MG: 25 TABLET, FILM COATED, EXTENDED RELEASE ORAL at 08:14

## 2022-08-02 RX ADMIN — HYDROCODONE BITARTRATE AND ACETAMINOPHEN 1 TABLET: 7.5; 325 TABLET ORAL at 08:10

## 2022-08-02 NOTE — ANESTHESIA PROCEDURE NOTES
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 All questions were answered. The patient was discharged with appropriate instructions.  Prep:  Pt Position:prone  Sterile Tech:cap, gloves, mask and sterile barrier  Prep:chlorhexidine gluconate and isopropyl alcohol  Monitoring:blood pressure monitoring, continuous pulse oximetry and EKG  Procedure:Sedation: yes (Midazolam 1 mg IV)     Approach:right paramedian  Guidance: fluoroscopy  Location:lumbar  Level:3-4 (Interlaminar)  Needle Type:Tuohy  Needle Gauge:20 G  Aspiration:negative  Medications:  Preservative Free Saline:3mL  Isovue:2mL  Comments:Isovue dye spread was consistent with epidural placement.Depomedrol:80 mg  Post Assessment:  Dressing:occlusive dressing applied  Pt Tolerance:patient tolerated the procedure well with no apparent complications  Complications:no

## 2022-08-02 NOTE — CONSULTS
Patient Name: Tony Leonard  :  1940  MRN:  [id number]  Date of Admission: 2022    Subjective     Patient is a 82 y.o. male presents with chief complaint of chronic, intermitent, severe low back and right hip pain.  Onset of symptoms was gradual starting 6 weeks ago.  Symptoms are associated/aggravated by activity or walking for more than a few minutes. Symptoms improve with pain medication, injection and rest  He has undergone a previous lumbar decompression.  On a pain scale from 0-10, he rates his pain as a 7 while at rest and 9 with activity.  He describes the pain as sharp and burning in nature.  He was referred to the pain clinic for a series of lumbar epidural steroid injections.    [facility] at [facility] 2022 reveals multilevel spondylosis and laminectomy, chronic compression fracture L1 with 80% loss of height, acute compression deformity at L3 with 75% loss of height.  Anterolisthesis L4 on L5 retrolisthesis of L1 on L2.  Canal stenosis worse at L4-5, varying degrees of foraminal narrowing worse to the left at L4-5 and L2-3, bilateral L3-4 and to the right at L1-2.      The following portions of the patients history were reviewed and updated as appropriate: current medications, allergies, past medical history, past surgical history, past family history, past social history and problem list                Objective     Past Medical History:   Past Medical History:   Diagnosis Date   • ADHD (attention deficit hyperactivity disorder)    • Bronchiectasis (HCC)    • Colon polyp    • COPD (chronic obstructive pulmonary disease) ([facility])    • Diverticulosis    • Hard of hearing    • On home oxygen therapy     2 LITERS   • Pneumonia    • Prostate cancer (HCC) 2019     Past Surgical History:   Past Surgical History:   Procedure Laterality Date   • BRONCHOSCOPY N/A 2016    Procedure: BRONCHOSCOPY with BAL of right lower lobe and left  lower lobe.  ;  Surgeon: Nando Diaz MD;  Location:  [facility] ENDOSCOPY;  Service:    • BRONCHOSCOPY N/A 4/28/2017    Procedure: BRONCHOSCOPY;  Surgeon: Katharina Salter MD;  Location: [facility] ENDOSCOPY;  Service:    • BRONCHOSCOPY Bilateral 6/29/2017    Procedure: BRONCHOSCOPY WITH WASHINGS;  Surgeon: Katharina Salter MD;  Location: [facility] ENDOSCOPY;  Service:    • BRONCHOSCOPY N/A 1/22/2018    Procedure: BRONCHOSCOPY AT BEDSIDE with BAL;  Surgeon: Katharina Salter MD;  Location: [facility] ENDOSCOPY;  Service:    • BRONCHOSCOPY N/A 1/30/2018    Procedure: BRONCHOSCOPY with BAL and washing;  Surgeon: Shreyas Wild MD;  Location: [facility] ENDOSCOPY;  Service:    • BRONCHOSCOPY Bilateral 4/24/2018    Procedure: BRONCHOSCOPY WITH WASHING;  Surgeon: Katharina Salter MD;  Location: [facility] ENDOSCOPY;  Service: Pulmonary   • BRONCHOSCOPY N/A 12/28/2019    Procedure: BRONCHOSCOPY with bilateral washing;  Surgeon: Katharina Salter MD;  Location: [facility] ENDOSCOPY;  Service: Pulmonary   • COLONOSCOPY  05/17/2013    eh, ih, tort, sig tics   • COLONOSCOPY N/A 5/10/2019    Non-thrombosed external hemorrhoids found on perianal exam, Diverticulosis, Tortuous colon, One 5 mm polyp in the mid ascending colon, IH. Path: Tubular adenoma.    • ENDOSCOPY  03/19/2015    z line gemma, dequan   • HIP OPEN REDUCTION Right 1/17/2018    Procedure: HIP OPEN REDUCTION INTERNAL FIXATION WITH DYNAMIC HIP SCREW;  Surgeon: Les Black MD;  Location: [facility] OR;  Service:    • SPINE SURGERY     • TONSILLECTOMY     • TOTAL HIP ARTHROPLASTY REVISION Right 9/23/2019    Procedure: HIP  REVISION RIGHT;  Surgeon: Isauro Warner MD;  Location: [facility] OR;  Service: Orthopedics     Family History:   Family History   Problem Relation Age of Onset   • Thyroid disease Mother    • No Known Problems Father    • Malig Hyperthermia Neg Hx      Social History:   Social History     Socioeconomic History   • Marital status: Single   Tobacco Use   • Smoking status: Never Smoker   • Smokeless tobacco: Never Used   Vaping Use   •  "Vaping Use: Never used   Substance and Sexual Activity   • Alcohol use: No     Comment: red wine occassional   • Drug use: No   • Sexual activity: Defer       Vital Signs Range for the last 24 hours  Temperature: Temp:  [36.5 °C (97.7 °F)-36.8 °C (98.2 °F)] 36.5 °C (97.7 °F)   Temp Source: Temp src: Infrared   BP: BP: (127-168)/(66-83) 133/71   Pulse: Heart Rate:  [50-86] 59   Respirations: Resp:  [16-18] 16   SPO2: SpO2:  [95 %-99 %] 96 %   O2 Amount (l/min):     O2 Devices Device (Oxygen Therapy): room air   Weight: Weight:  [81.7 kg (180 lb 1.9 oz)] 81.7 kg (180 lb 1.9 oz)     Flowsheet Rows    Flowsheet Row First Filed Value   Admission Height 180.3 cm (71\") Documented at 08/01/2022 1929   Admission Weight 81.7 kg (180 lb 1.9 oz) Documented at 08/01/2022 1929          --------------------------------------------------------------------------------    Current Facility-Administered Medications   Medication Dose Route Frequency Provider Last Rate Last Admin   • acetaminophen (TYLENOL) tablet 650 mg  650 mg Oral Q4H PRN Elizabet Parra MD        Or   • acetaminophen (TYLENOL) 160 MG/5ML solution 650 mg  650 mg Oral Q4H PRN Elizabet Parra MD        Or   • acetaminophen (TYLENOL) suppository 650 mg  650 mg Rectal Q4H PRN Elizabet Parra MD       • albuterol (PROVENTIL) nebulizer solution 0.083% 2.5 mg/3mL  2.5 mg Nebulization Q6H PRN Elizabet Parra MD       • budesonide (PULMICORT) nebulizer solution 0.5 mg  0.5 mg Nebulization BID Elizabet Parra MD   0.5 mg at 08/02/22 0702   • calcium carbonate (TUMS) chewable tablet 500 mg (200 mg elemental)  1 tablet Oral PRN Elizabet Parra MD       • fentaNYL citrate (PF) (SUBLIMAZE) injection 50 mcg  50 mcg Intravenous PRN Tj Dickson MD       • FLUoxetine (PROzac) capsule 20 mg  20 mg Oral Daily Elizabet Parra MD   20 mg at 08/02/22 0811   • furosemide (LASIX) tablet 40 mg  40 mg Oral Daily Elizabet Parra MD    "    • gabapentin (NEURONTIN) capsule 300 mg  300 mg Oral TID Elizabet Parra MD   300 mg at 08/02/22 0814   • HYDROcodone-acetaminophen (NORCO) 7.5-325 MG per tablet 1 tablet  1 tablet Oral Q6H PRN Elizabet Parra MD   1 tablet at 08/02/22 0810   • iopamidol (ISOVUE-M 200) injection 41%  12 mL Epidural Once in imaging Tj Dickson MD       • ipratropium-albuterol (DUO-NEB) nebulizer solution 3 mL  3 mL Nebulization Q8H - RT Elizabet Parra MD   3 mL at 08/02/22 0702   • lidocaine (XYLOCAINE) 1 % injection 1 mL  1 mL Intradermal Once PRN Tj Dickson MD       • methylphenidate (RITALIN) tablet 10 mg  10 mg Oral Daily Elizabet Parra MD   10 mg at 08/02/22 0928   • methylPREDNISolone acetate (DEPO-medrol) injection 80 mg  80 mg Intra-articular Once Tj Dickson MD       • midazolam (VERSED) injection 1 mg  1 mg Intravenous PRN Tj Dickson MD       • Mirabegron ER (MYRBETRIQ) 24 hr tablet 50 mg  50 mg Oral Daily Elizabet Parra MD   50 mg at 08/02/22 0814   • morphine injection 4 mg  4 mg Intravenous Q4H PRN Elizabet Parra MD       • multivitamin with minerals 1 tablet  1 tablet Oral Daily Elizabet Parra MD   1 tablet at 08/02/22 0814   • ondansetron (ZOFRAN) injection 4 mg  4 mg Intravenous Q6H PRN Elizabet Parra MD       • pantoprazole (PROTONIX) EC tablet 40 mg  40 mg Oral QAM Elizabet Parra MD   40 mg at 08/02/22 0813   • polyethylene glycol (MIRALAX) packet 17 g  17 g Oral Daily Elizabet Parra MD   17 g at 08/02/22 0811   • potassium chloride (K-DUR,KLOR-CON) ER tablet 20 mEq  20 mEq Oral Daily Elizabet Parra MD   20 mEq at 08/02/22 0814   • rOPINIRole (REQUIP) tablet 0.5 mg  0.5 mg Oral Nightly Stingl, Elizabet Morales MD   0.5 mg at 08/01/22 7799   • sodium chloride 0.9 % flush 1-10 mL  1-10 mL Intravenous PRN Tj Dickson MD       • vitamin B-12 (CYANOCOBALAMIN) tablet 500 mcg  500 mcg Oral Daily Stingl,  Elizabet Morales MD   500 mcg at 08/02/22 0813       --------------------------------------------------------------------------------  Assessment & Plan      Anesthesia Evaluation     Patient summary reviewed and Nursing notes reviewed   NPO Solid Status: > 8 hours  NPO Liquid Status: > 2 hours           Airway   Mallampati: II  TM distance: >3 FB  Neck ROM: full  Dental - normal exam     Pulmonary - normal exam    breath sounds clear to auscultation  (+) pneumonia , COPD,   Cardiovascular - normal exam    Rhythm: regular  Rate: normal    (+) CHF Systolic <55%,   (-) angina, orthopnea, PND, RODRIGUEZ      Neuro/Psych  (+) numbness, psychiatric history ADHD,    GI/Hepatic/Renal/Endo    (+)  GERD, GI bleeding lower ,     Musculoskeletal     Abdominal    Substance History - negative use     OB/GYN negative ob/gyn ROS         Other   arthritis,    history of cancer               Diagnosis and Plan    Treatment Plan  ASA 3      Procedures: Lumbar Epidural Steroid Injection(LESI), With fluoroscopy,       Anesthetic plan and risks discussed with patient.        Alternative management options include physical therapy, medical management with nonsteroidal anti-inflammatory medications or narcotics, chiropractic manipulation and surgical intervention.    1.  Lumbar epidural steroid injections, up to 3, spaced 1-2 weeks apart.  If pain control is acceptable after 1 or 2 injections, it was discussed with the patient that they may return for the subsequent injections if and when their pain returns.  The risks were discussed with the patient including failure of relief, worsening pain, Headache (post dural puncture headache), bleeding (epidural hematoma) and infection (epidural abscess or skin infection).  2.  Physical therapy exercises at home as prescribed by physical therapy or from the pain clinic handout (given to the patient).  Continuation of these exercises every day, or multiple times per week, even when the patient has good  pain relief, was stressed to the patient as a preventative measure to decrease the frequency and severity of future pain episodes.  3.  Continue pain medicines as already prescribed.  If patient not currently taking any, it is recommended to begin Acetaminophen 1000 mg po q 8 hours.  If other medicines containing Acetaminophen are currently prescribed, maintain daily dose at 3000 mg.    4.  If they can tolerate NSAIDS, it is recommended to take Ibuprofen 600 mg po q 6 hours for 7 days during pain exacerbations.  Alternatively, they may substitute an NSAID of their choice (e.g. Aleve).  This may be taken at the same time as Acetaminophen.  5.  Heat and ice to the affected area as tolerated for pain control.  It was discussed that heating pads can cause burns.  6.  Daily low impact exercise such as walking or water exercise was recommended to maintain overall health and aid in weight control.   7.  Follow up as needed for subsequent injections.  8.  Patient was counseled to abstain from tobacco products.    Diagnosis     * Spinal stenosis of lumbar region with neurogenic claudication [M48.062]     * Lumbar radiculopathy [M54.16]      * Closed compression fracture of body of lumbar vertebra (HCC) [S32.000A]

## 2022-08-02 NOTE — PROGRESS NOTES
Name: Tony Leonard ADMIT: 2022   : 1940  PCP: Erich Aviles MD    MRN: [id number] LOS: 1 days   AGE/SEX: 82 y.o. male  ROOM: [facility]     Subjective   Subjective   Resting in bed. Back from epidural with pain management. Reports right thigh pain at present. He is trying to rub it as he is having a spasm. No nausea or vomiting. No chest pain or dyspnea. Hasn't had any pain medication since this morning.     Objective   Objective   Vital Signs  Temp:  [97.7 °F (36.5 °C)-98.2 °F (36.8 °C)] 97.7 °F (36.5 °C)  Heart Rate:  [51-86] 73  Resp:  [16-18] 18  BP: (127-168)/(66-83) 128/77  SpO2:  [92 %-98 %] 92 %  on   ;   Device (Oxygen Therapy): room air  Body mass index is 25.12 kg/m².     Physical Exam  Vitals and nursing note reviewed.   Constitutional:       Appearance: He is ill-appearing. He is not toxic-appearing.   Cardiovascular:      Rate and Rhythm: Normal rate and regular rhythm.      Pulses: Normal pulses.      Heart sounds: Normal heart sounds.   Pulmonary:      Effort: Pulmonary effort is normal. No respiratory distress.      Breath sounds: Normal breath sounds.   Abdominal:      General: Bowel sounds are normal. There is no distension.      Palpations: Abdomen is soft.      Tenderness: There is no abdominal tenderness.   Musculoskeletal:         General: Tenderness (right thigh) present.      Cervical back: Normal range of motion and neck supple.   Skin:     General: Skin is warm and dry.      Findings: No bruising.   Neurological:      Mental Status: He is alert and oriented to person, place, and time.   Psychiatric:         Mood and Affect: Mood normal.         Behavior: Behavior normal.       Results Review:       I reviewed the patient's new clinical results.  Results from last 7 days   Lab Units 22  0727   WBC 10*3/mm3 6.94   HEMOGLOBIN g/dL 11.5*   PLATELETS 10*3/mm3 215     Results from last 7 days   Lab Units 22  0727   SODIUM mmol/L 139   POTASSIUM mmol/L 4.1    CHLORIDE mmol/L 105   CO2 mmol/L 24.8   BUN mg/dL 23   CREATININE mg/dL 0.77   GLUCOSE mg/dL 94   Estimated Creatinine Clearance: 82.5 mL/min (by C-G formula based on SCr of 0.77 mg/dL).    Results from last 7 days   Lab Units 08/02/22  0727   CALCIUM mg/dL 8.7       No results found for: HGBA1C, POCGLU    budesonide, 0.5 mg, Nebulization, BID  FLUoxetine, 20 mg, Oral, Daily  furosemide, 40 mg, Oral, Daily  gabapentin, 300 mg, Oral, TID  ipratropium-albuterol, 3 mL, Nebulization, Q8H - RT  methylphenidate, 10 mg, Oral, Daily  Mirabegron ER, 50 mg, Oral, Daily  multivitamin with minerals, 1 tablet, Oral, Daily  pantoprazole, 40 mg, Oral, QAM  polyethylene glycol, 17 g, Oral, Daily  potassium chloride, 20 mEq, Oral, Daily  rOPINIRole, 0.5 mg, Oral, Nightly  vitamin B-12, 500 mcg, Oral, Daily       Diet Regular; Cardiac       Assessment/Plan     Active Hospital Problems    Diagnosis  POA   • **Spinal stenosis, lumbar region with neurogenic claudication [M48.062]  Yes   • Compression deformity of vertebra [M43.9]  Unknown   • Chronic diastolic CHF (congestive heart failure) ([facility]) [I50.32]  Yes   • COPD (chronic obstructive pulmonary disease) ([facility]) [J44.9]  Yes   • Benign non-nodular prostatic hyperplasia without lower urinary tract symptoms [N40.0]  Yes      Resolved Hospital Problems   No resolved problems to display.     Mr. Salomon is a 82 year old male who presented to the hospital with complaints of worsening back pain. He has a history of chronic back pain followed previously by pain management. He was seen most recently by Dr. Gordon on 7/22/22 where MRI was ordered, but had worsening pain with right radicular symptoms prompting him to come to the ED. MRI from 7/26 showed chronic compression of L1, acute compression deformity of L3 with severe lumbar spondylosis. Canal stenosis was noted to be worse at L4-5. Ortho spine was consulted.     · Spinal stenosis/compression deformity: Ortho spine has been consulted.  He is s/p LESI with Pain Management today. Ortho plans for LSO brace, PT/OT evaluations. Continue oral Norco as needed as well as gabapentin. Will add PRN Flexeril for spasms.   · Chronic diastolic HF: Appears fairly compensated. Continue home dosing Lasix.  · COPD: No wheezing on exam. Monitor for any new/worsening oxygen requirements. Home nebulizers continued.  · BPH: Monitor for retention with acute back issues.     Discussed with patient, nurse and Dr. Tomlinson.    VTE Prophylaxis - SCDs  Code Status - Full code  Disposition - Anticipate discharge TBD.      PABLITO Wang  [facility]  08/02/22  16:44 EDT

## 2022-08-02 NOTE — CONSULTS
Orthopedic Consult      Patient: Tony Leonard    Date of Admission: 8/1/2022 11:44 AM    YOB: 1940    Medical Record Number: [id number]    Attending Physician: Alberto Tomlinson MD    Consulting Physician: Alberto Tomlinson MD      Chief Complaints: Back and leg pain      History of Present Illness: 82 y.o. male admitted to [facility] to services of Alberto Tomlinson MD with complaints of worsening back and right lower extremity pain.  He has had chronic back pain for several years.  He has been through physical therapy and is established with pain management however he states he has not had an epidural injection in probably 2 years.  He has been evaluated by Dr. Gordon as early as February 2021 and although he had varying degrees of stenosis, he was decided that due to his age and health surgery was not ideal as it would likely involve a T10 to sacral fusion and not likely to offer relief he is seeking.  He also was treated for sacral ala fracture earlier this year which he states healed but unfortunately developed worsening back and right buttock, hip and quad pain around May.  He denies any associated injury.  He last saw Dr. Gordon on 7/22/2022 and MRI was ordered.  He did have that completed but then presented to the emergency room 8/1/2022 with worsening back and radicular pain.  He denies any true bowel or bladder dysfunction but does find it more difficult to make it to the bathroom on time due to the pain.  He lives alone, he does have stairs in his house however also has an elevator.  He has a son who checks on him often.  History of COPD, prostate cancer and chronic back pain with history of laminectomy.      Allergies:   Allergies   Allergen Reactions   • Daliresp [Roflumilast] Anaphylaxis   • Latex Rash   • Sulfa Antibiotics Rash       Medications:   Home Medications:  No current facility-administered medications on file prior to encounter.     Current Outpatient Medications on File Prior  to Encounter   Medication Sig   • albuterol (ACCUNEB) 1.25 MG/3ML nebulizer solution Inhale 1 ampule.   • albuterol (PROVENTIL) (2.5 MG/3ML) 0.083% nebulizer solution Take 2.5 mg by nebulization 4 (Four) Times a Day. (Patient taking differently: Take 2.5 mg by nebulization 2 (Two) Times a Day.)   • Anoro Ellipta 62.5-25 MCG/INH aerosol powder  inhaler USE 1 INHALATION DAILY   • budesonide (PULMICORT) 0.5 MG/2ML nebulizer solution Take 2 mL by nebulization 2 (Two) Times a Day. 1 vial only twice daily   • calcium carbonate (TUMS) 500 MG chewable tablet Chew 1 tablet As Needed for Indigestion or Heartburn.   • colestipol (COLESTID) 5 g granules Take 5 g by mouth 2 (Two) Times a Day. Prn after diarrhea   • FLUoxetine (PROzac) 20 MG capsule TAKE 1 CAPSULE DAILY   • furosemide (LASIX) 40 MG tablet Take 40 mg by mouth.   • gabapentin (NEURONTIN) 300 MG capsule Take 1 capsule by mouth 3 (Three) Times a Day. (Patient taking differently: Take 100 mg by mouth Every Night.)   • ketoconazole (NIZORAL) 2 % shampoo APPLY TOPICALLY TO THE APPROPRIATE AREA AS DIRECTED TWO TIMES A WEEK   • methylphenidate (Ritalin) 10 MG tablet Take 1 tablet by mouth Daily. For ADD 3 months   • Multiple Vitamins-Minerals (MULTIVITAMIN ADULT PO) Take 1 tablet by mouth Daily.   • naproxen (Naprosyn) 500 MG tablet Take 1 tablet by mouth 2 (Two) Times a Day With Meals.   • oxyCODONE-acetaminophen (PERCOCET) 7.5-325 MG per tablet Take 1 tablet by mouth Every 8 (Eight) Hours As Needed for Severe Pain . 30 day prescription.   • OXYGEN-HELIUM IN Inhale 2.5 L Daily As Needed.   • pantoprazole (PROTONIX) 40 MG EC tablet Take 1 tablet by mouth Daily.   • polyethylene glycol 17 g packet DISSOLVE ONE PACKET IN 8OZ LIQUID & DRINK BY MOUTH DAILY   • potassium chloride (K-DUR,KLOR-CON) 20 MEQ CR tablet Take 1 tablet by mouth Daily.   • rOPINIRole (REQUIP) 0.5 MG tablet TAKE 1 TO 2 TABLETS EVERY NIGHT 1 HOUR BEFORE BEDTIME   • sodium chloride 7 % nebulizer solution  nebulizer solution    • terbinafine (lamiSIL) 250 MG tablet    • TiZANidine (ZANAFLEX) 6 MG capsule TAKE 1 CAPSULE EVERY NIGHT   • Turmeric 500 MG capsule Take  by mouth Daily.   • vitamin B-12 (CYANOCOBALAMIN) 2500 MCG sublingual tablet tablet Take 2,500 mcg by mouth Every Evening.   • Misc. Devices (Rollator Ultra-Light) misc 1 application Daily. Severe DJD   • Myrbetriq 25 MG tablet sustained-release 24 hour 24 hr tablet      Current Medications:  Scheduled Meds:budesonide, 0.5 mg, Nebulization, BID  FLUoxetine, 20 mg, Oral, Daily  furosemide, 40 mg, Oral, Daily  gabapentin, 300 mg, Oral, TID  ipratropium-albuterol, 3 mL, Nebulization, Q8H - RT  methylphenidate, 10 mg, Oral, Daily  Mirabegron ER, 50 mg, Oral, Daily  multivitamin with minerals, 1 tablet, Oral, Daily  pantoprazole, 40 mg, Oral, QAM  polyethylene glycol, 17 g, Oral, Daily  potassium chloride, 20 mEq, Oral, Daily  rOPINIRole, 0.5 mg, Oral, Nightly  vitamin B-12, 500 mcg, Oral, Daily      Continuous Infusions:   PRN Meds:.•  acetaminophen **OR** acetaminophen **OR** acetaminophen  •  albuterol  •  calcium carbonate  •  HYDROcodone-acetaminophen  •  Morphine  •  ondansetron    Past Medical History:   Diagnosis Date   • ADHD (attention deficit hyperactivity disorder)    • Bronchiectasis (HCC)    • Colon polyp    • COPD (chronic obstructive pulmonary disease) (HCC)    • Diverticulosis    • Hard of hearing    • On home oxygen therapy     2 LITERS   • Pneumonia    • Prostate cancer (HCC) 4/22/2019     Past Surgical History:   Procedure Laterality Date   • BRONCHOSCOPY N/A 4/30/2016    Procedure: BRONCHOSCOPY with BAL of right lower lobe and left  lower lobe.  ;  Surgeon: Nando Diaz MD;  Location: [facility] ENDOSCOPY;  Service:    • BRONCHOSCOPY N/A 4/28/2017    Procedure: BRONCHOSCOPY;  Surgeon: Katharina Salter MD;  Location: [facility] ENDOSCOPY;  Service:    • BRONCHOSCOPY Bilateral 6/29/2017    Procedure: BRONCHOSCOPY WITH WASHINGS;  Surgeon: Katharina GUILLAUME  MD Gaetano;  Location: [facility] ENDOSCOPY;  Service:    • BRONCHOSCOPY N/A 1/22/2018    Procedure: BRONCHOSCOPY AT BEDSIDE with BAL;  Surgeon: Katharina Salter MD;  Location: [facility] ENDOSCOPY;  Service:    • BRONCHOSCOPY N/A 1/30/2018    Procedure: BRONCHOSCOPY with BAL and washing;  Surgeon: Shreyas Wild MD;  Location: [facility] ENDOSCOPY;  Service:    • BRONCHOSCOPY Bilateral 4/24/2018    Procedure: BRONCHOSCOPY WITH WASHING;  Surgeon: Katharina Salter MD;  Location: [facility] ENDOSCOPY;  Service: Pulmonary   • BRONCHOSCOPY N/A 12/28/2019    Procedure: BRONCHOSCOPY with bilateral washing;  Surgeon: Katharina Salter MD;  Location: [facility] ENDOSCOPY;  Service: Pulmonary   • COLONOSCOPY  05/17/2013    eh, ih, tort, sig tics   • COLONOSCOPY N/A 5/10/2019    Non-thrombosed external hemorrhoids found on perianal exam, Diverticulosis, Tortuous colon, One 5 mm polyp in the mid ascending colon, IH. Path: Tubular adenoma.    • ENDOSCOPY  03/19/2015    z line irreg, hh   • HIP OPEN REDUCTION Right 1/17/2018    Procedure: HIP OPEN REDUCTION INTERNAL FIXATION WITH DYNAMIC HIP SCREW;  Surgeon: Les Black MD;  Location: [facility] OR;  Service:    • SPINE SURGERY     • TONSILLECTOMY     • TOTAL HIP ARTHROPLASTY REVISION Right 9/23/2019    Procedure: HIP  REVISION RIGHT;  Surgeon: Isauro Warner MD;  Location: [facility] OR;  Service: Orthopedics     Social History     Occupational History   • Not on file   Tobacco Use   • Smoking status: Never Smoker   • Smokeless tobacco: Never Used   Vaping Use   • Vaping Use: Never used   Substance and Sexual Activity   • Alcohol use: No     Comment: red wine occassional   • Drug use: No   • Sexual activity: Defer      Social History     Social History Narrative   • Not on file     Family History   Problem Relation Age of Onset   • Thyroid disease Mother    • No Known Problems Father    • Malig Hyperthermia Neg Hx          Review of Systems:     All pertinent positives and negatives as noted above  in HPI.    Physical Exam: 82 y.o. male    General:  Awake, alert. No acute distress.      Head/Neck:  Normocephalic, atraumatic.  Conjunctivae and sclerae clear.  Hearing adequate for the exam.  Neck is supple with normal ROM.    Psych:  Affect and demeanor appropriate.    CV:  Regular rate and rhythm.  Hemodynamically stable.    Lungs:  Good chest expansion, breathing unlabored.    Abdomen:  Soft.  Nontender, nondistended.    Extremities:       Skin appears benign without obvious lacerations, ulcerations or lesions.  No gross deformity or malalignment noted.  Compartments soft without evidence for DVT or compartment syndrome.  No atrophy.  No palpable masses or adenopathy.  Focal TTP over the midline lumbar spine and right greater trochanter.  ROM intact, some pain with range of motion of the right hip.   No obvious instability although exam is limited due to discomfort.  Motor is 5/5 in lower extremities although more 4/5 with right hip flexor which could be pain limited and positional.  Sensation to light touch grossly intact in bilateral lower extremities.  Gait assessment deferred due to pain and risk of fall.    All other extremities atraumatic without gross abnormality.       Diagnostic Tests:    Admission on 08/01/2022   Component Date Value Ref Range Status   • COVID19 08/01/2022 Not Detected  Not Detected - Ref. Range Final     Lab Results (last 24 hours)     Procedure Component Value Units Date/Time    Basic Metabolic Panel [[id number]] Collected: 08/02/22 0727    Specimen: Blood Updated: 08/02/22 0745    CBC Auto Differential [[id number]] Collected: 08/02/22 0727    Specimen: Blood Updated: 08/02/22 0745    COVID PRE-OP / PRE-PROCEDURE SCREENING ORDER (NO ISOLATION) - Swab, Nasopharynx [[id number]]  (Normal) Collected: 08/01/22 1633    Specimen: Swab from Nasopharynx Updated: 08/01/22 6484    Narrative:      The following orders were created for panel order COVID PRE-OP / PRE-PROCEDURE SCREENING ORDER (NO  ISOLATION) - Swab, Nasopharynx.  Procedure                               Abnormality         Status                     ---------                               -----------         ------                     COVID-19,[facility] IN-HOUSE...[[id number]]  Normal              Final result                 Please view results for these tests on the individual orders.    COVID-19,[facility] IN-HOUSE CEPHEID/JON NP SWAB IN TRANSPORT MEDIA 8-12 HR TAT - Swab, Nasopharynx [[id number]]  (Normal) Collected: 08/01/22 1633    Specimen: Swab from Nasopharynx Updated: 08/01/22 1719     COVID19 Not Detected    Narrative:      Fact sheet for providers: https://www.fda.gov/media/684673/download    Fact sheet for patients: https://www.fda.gov/media/027968/download    Test performed by PCR.          Imaging:   Indication: pain related symptoms,  Views: MRI report read Lumbar   Findings: MRI at [facility] 7/26/2022 reveals multilevel spondylosis and laminectomy, chronic compression fracture L1 with 80% loss of height, acute compression deformity at L3 with 75% loss of height.  Anterolisthesis L4 on L5 retrolisthesis of L1 on L2.  Canal stenosis worse at L4-5, varying degrees of foraminal narrowing worse to the left at L4-5 and L2-3, bilateral L3-4 and to the right at L1-2.  Comparison views: None      Assessment: L3 compression deformity, severe lumbar spondylosis with radiculopathy    Plan: At this point we will add an LSO brace and see how he does up with PT and OT.  We will also ask pain management to try an epidural for the radicular pain, pain management has been called and order placed.  We did discuss possibility of kyphoplasty if back pain is too severe for him to be active.  Would ideally avoid any invasive lumbar surgery due to age and comorbidities and patient is agreeable.    Date: 8/2/2022    PABLITO Banks    CC: Alberto Tomlinson MD

## 2022-08-02 NOTE — H&P
HISTORY AND PHYSICAL   [facility]        Date of Admission: 2022  Patient Identification:  Name: Tony Leonard  Age: 82 y.o.  Sex: male  :  1940  MRN: [id number]                     Primary Care Physician: Erich Aviles MD    Chief Complaint:82 year old gentleman who presented to the emergency room with intractable pain of his back which started about six weeks ago; the pain goes down his right leg; he has been evaluated by dr alcocer; he usually ambulates with walker but was unable to ; he has had some urinary incontinence due not being able to get to the bathroom on time     History of Present Illness:   As above    Past Medical History:  Past Medical History:   Diagnosis Date   • ADHD (attention deficit hyperactivity disorder)    • Bronchiectasis (HCC)    • Colon polyp    • COPD (chronic obstructive pulmonary disease) ([facility])    • Diverticulosis    • Hard of hearing    • On home oxygen therapy     2 LITERS   • Pneumonia    • Prostate cancer ([facility]) 2019     Past Surgical History:  Past Surgical History:   Procedure Laterality Date   • BRONCHOSCOPY N/A 2016    Procedure: BRONCHOSCOPY with BAL of right lower lobe and left  lower lobe.  ;  Surgeon: Nando Diaz MD;  Location: [facility];  Service:    • BRONCHOSCOPY N/A 2017    Procedure: BRONCHOSCOPY;  Surgeon: Katharina Salter MD;  Location: [facility] ENDOSCOPY;  Service:    • BRONCHOSCOPY Bilateral 2017    Procedure: BRONCHOSCOPY WITH WASHINGS;  Surgeon: Katharina Salter MD;  Location: [facility] ENDOSCOPY;  Service:    • BRONCHOSCOPY N/A 2018    Procedure: BRONCHOSCOPY AT BEDSIDE with BAL;  Surgeon: Katharina Salter MD;  Location: [facility] ENDOSCOPY;  Service:    • BRONCHOSCOPY N/A 2018    Procedure: BRONCHOSCOPY with BAL and washing;  Surgeon: Shreyas Wild MD;  Location: [facility] ENDOSCOPY;  Service:    • BRONCHOSCOPY Bilateral 2018    Procedure: BRONCHOSCOPY WITH WASHING;  Surgeon: Katharina Salter MD;   Location[facility] ENDOSCOPY;  Service: Pulmonary   • BRONCHOSCOPY N/A 12/28/2019    Procedure: BRONCHOSCOPY with bilateral washing;  Surgeon: Katharina Salter MD;  [facility];  Service: Pulmonary   • COLONOSCOPY  05/17/2013    eh, ih, tort, sig tics   • COLONOSCOPY N/A 5/10/2019    Non-thrombosed external hemorrhoids found on perianal exam, Diverticulosis, Tortuous colon, One 5 mm polyp in the mid ascending colon, IH. Path: Tubular adenoma.    • ENDOSCOPY  03/19/2015    z line irreg, hh   • HIP OPEN REDUCTION Right 1/17/2018    Procedure: HIP OPEN REDUCTION INTERNAL FIXATION WITH DYNAMIC HIP SCREW;  Surgeon: Les Black MD;  Location: [facility] MAIN OR;  Service:    • SPINE SURGERY     • TONSILLECTOMY     • TOTAL HIP ARTHROPLASTY REVISION Right 9/23/2019    Procedure: HIP  REVISION RIGHT;  Surgeon: Isauro Warner MD;  Location: [facility] MAIN OR;  Service: Orthopedics      Home Meds:  Medications Prior to Admission   Medication Sig Dispense Refill Last Dose   • albuterol (ACCUNEB) 1.25 MG/3ML nebulizer solution Inhale 1 ampule.      • albuterol (PROVENTIL) (2.5 MG/3ML) 0.083% nebulizer solution Take 2.5 mg by nebulization 4 (Four) Times a Day. (Patient taking differently: Take 2.5 mg by nebulization 2 (Two) Times a Day.) 360 mL 3    • Anoro Ellipta 62.5-25 MCG/INH aerosol powder  inhaler USE 1 INHALATION DAILY 180 each 3    • budesonide (PULMICORT) 0.5 MG/2ML nebulizer solution Take 2 mL by nebulization 2 (Two) Times a Day. 1 vial only twice daily 30 each 3    • calcium carbonate (TUMS) 500 MG chewable tablet Chew 1 tablet As Needed for Indigestion or Heartburn.      • colestipol (COLESTID) 5 g granules Take 5 g by mouth 2 (Two) Times a Day. Prn after diarrhea 500 g 3    • FLUoxetine (PROzac) 20 MG capsule TAKE 1 CAPSULE DAILY 90 capsule 3    • furosemide (LASIX) 40 MG tablet Take 40 mg by mouth.      • gabapentin (NEURONTIN) 300 MG capsule Take 1 capsule by mouth 3 (Three) Times a Day. (Patient  taking differently: Take 100 mg by mouth Every Night.) 90 capsule 3    • ketoconazole (NIZORAL) 2 % shampoo APPLY TOPICALLY TO THE APPROPRIATE AREA AS DIRECTED TWO TIMES A WEEK 120 mL 3    • methylphenidate (Ritalin) 10 MG tablet Take 1 tablet by mouth Daily. For ADD 3 months 30 tablet 0    • Multiple Vitamins-Minerals (MULTIVITAMIN ADULT PO) Take 1 tablet by mouth Daily.      • naproxen (Naprosyn) 500 MG tablet Take 1 tablet by mouth 2 (Two) Times a Day With Meals. 60 tablet 3    • oxyCODONE-acetaminophen (PERCOCET) 7.5-325 MG per tablet Take 1 tablet by mouth Every 8 (Eight) Hours As Needed for Severe Pain . 30 day prescription. 60 tablet 0    • OXYGEN-HELIUM IN Inhale 2.5 L Daily As Needed.      • pantoprazole (PROTONIX) 40 MG EC tablet Take 1 tablet by mouth Daily. 90 tablet 3    • polyethylene glycol 17 g packet DISSOLVE ONE PACKET IN 8OZ LIQUID & DRINK BY MOUTH DAILY 28 each 10    • potassium chloride (K-DUR,KLOR-CON) 20 MEQ CR tablet Take 1 tablet by mouth Daily.      • rOPINIRole (REQUIP) 0.5 MG tablet TAKE 1 TO 2 TABLETS EVERY NIGHT 1 HOUR BEFORE BEDTIME 90 tablet 7    • sodium chloride 7 % nebulizer solution nebulizer solution       • terbinafine (lamiSIL) 250 MG tablet       • TiZANidine (ZANAFLEX) 6 MG capsule TAKE 1 CAPSULE EVERY NIGHT 90 capsule 3    • Turmeric 500 MG capsule Take  by mouth Daily.      • vitamin B-12 (CYANOCOBALAMIN) 2500 MCG sublingual tablet tablet Take 2,500 mcg by mouth Every Evening.      • Misc. Devices (Rollator Ultra-Light) misc 1 application Daily. Severe DJD 1 each 0    • Myrbetriq 25 MG tablet sustained-release 24 hour 24 hr tablet           Allergies:  Allergies   Allergen Reactions   • Daliresp [Roflumilast] Anaphylaxis   • Latex Rash   • Sulfa Antibiotics Rash     Immunizations:  Immunization History   Administered Date(s) Administered   • COVID-19 (PFIZER) PURPLE CAP 01/15/2021, 02/05/2021, 10/26/2021   • Fluzone High Dose =>65 Years ([employer] ONLY) 09/25/2013,  2019, 2020   • Influenza, Unspecified 2017   • Pneumococcal Conjugate 13-Valent (PCV13) 2017   • Pneumococcal Polysaccharide (PPSV23) 10/10/2003     Social History:   Social History     Social History Narrative   • Not on file     Social History     Socioeconomic History   • Marital status: Single   Tobacco Use   • Smoking status: Never Smoker   • Smokeless tobacco: Never Used   Vaping Use   • Vaping Use: Never used   Substance and Sexual Activity   • Alcohol use: No     Comment: red wine occassional   • Drug use: No   • Sexual activity: Defer       Family History:  Family History   Problem Relation Age of Onset   • Thyroid disease Mother    • No Known Problems Father    • Malig Hyperthermia Neg Hx         Review of Systems  See history of present illness and past medical history.  Patient denies headache, dizziness, syncope, falls, trauma, change in vision, change in hearing, change in taste, changes in weight, changes in appetite, focal weakness, numbness, or paresthesia.  Patient denies chest pain, palpitations, dyspnea, orthopnea, PND, cough, sinus pressure, rhinorrhea, epistaxis, hemoptysis, nausea, vomiting,hematemesis, diarrhea, constipation or hematchezia.  Denies cold or heat intolerance, polydipsia, polyuria, polyphagia. Denies hematuria, pyuria, dysuria, hesitancy, frequency or urgency. Denies consumption of raw and under cooked meats foods or change in water source.  Denies fever, chills, sweats, night sweats.  Denies missing any routine medications. Remainder of ROS is negative.    Objective:  T Max 24 hrs: Temp (24hrs), Av °F (36.7 °C), Min:97.8 °F (36.6 °C), Max:98.1 °F (36.7 °C)    Vitals Ranges:   Temp:  [97.8 °F (36.6 °C)-98.1 °F (36.7 °C)] 98.1 °F (36.7 °C)  Heart Rate:  [50-86] 86  Resp:  [16] 16  BP: ()/(46-83) 168/83      Exam:  /83 (BP Location: Left arm, Patient Position: Lying)   Pulse 86   Temp 98.1 °F (36.7 °C) (Oral)   Resp 16   Ht 180.3 cm  "(71\")   Wt 81.7 kg (180 lb 1.9 oz)   SpO2 97%   BMI 25.12 kg/m²     General Appearance:    Alert, cooperative, no distress, appears stated age   Head:    Normocephalic, without obvious abnormality, atraumatic   Eyes:    PERRL, conjunctivae/corneas clear, EOM's intact, both eyes   Ears:    Normal external ear canals, both ears   Nose:   Nares normal, septum midline, mucosa normal, no drainage    or sinus tenderness   Throat:   Lips, mucosa, and tongue normal   Neck:   Supple, symmetrical, trachea midline, no adenopathy;     thyroid:  no enlargement/tenderness/nodules; no carotid    bruit or JVD   Back:     Symmetric, no curvature, ROM normal, no CVA tenderness   Lungs:     Decreased breath sounds bilaterally, respirations unlabored   Chest Wall:    No tenderness or deformity    Heart:    Regular rate and rhythm, S1 and S2 normal, no murmur, rub   or gallop   Abdomen:     Soft, nontender, bowel sounds active all four quadrants,     no masses, no hepatomegaly, no splenomegaly   Extremities:   Extremities normal, atraumatic, no cyanosis or edema   Pulses:   2+ and symmetric all extremities   Skin:   Skin color, texture, turgor normal, no rashes or lesions   Lymph nodes:   Cervical, supraclavicular, and axillary nodes normal   Neurologic:   CNII-XII intact, normal strength, sensation intact throughout      .    Data Review:  Labs in chart were reviewed.  No results found for: WBC, HGB, HCT, PLT  No results found for: NA, K, CL, CO2, BUN, CREATININE, GLUCOSE  No results found for: CALCIUM, MG, PHOS  No results found for: AST, ALT, ALKPHOS             Imaging Results (All)     None            Assessment:  Active Hospital Problems    Diagnosis  POA   • Acute midline low back pain with right-sided sciatica [M54.41]  Yes      Resolved Hospital Problems   No resolved problems to display.   copd  Chronic hypoxic respiratory failure  hypertension    Plan:  Will continue pain meds  Ask dr alcocer to see him  Pt.ot to see  chf " is compensated        Elizabet Parra MD  8/1/2022  21:32 EDT

## 2022-08-03 LAB
ALBUMIN SERPL-MCNC: 4 G/DL (ref 3.5–5.2)
ALBUMIN/GLOB SERPL: 1.5 G/DL
ALP SERPL-CCNC: 134 U/L (ref 39–117)
ALT SERPL W P-5'-P-CCNC: 13 U/L (ref 1–41)
ANION GAP SERPL CALCULATED.3IONS-SCNC: 12 MMOL/L (ref 5–15)
AST SERPL-CCNC: 20 U/L (ref 1–40)
BILIRUB SERPL-MCNC: 0.3 MG/DL (ref 0–1.2)
BUN SERPL-MCNC: 21 MG/DL (ref 8–23)
BUN/CREAT SERPL: 26.6 (ref 7–25)
CALCIUM SPEC-SCNC: 9.2 MG/DL (ref 8.6–10.5)
CHLORIDE SERPL-SCNC: 103 MMOL/L (ref 98–107)
CO2 SERPL-SCNC: 20 MMOL/L (ref 22–29)
CREAT SERPL-MCNC: 0.79 MG/DL (ref 0.76–1.27)
DEPRECATED RDW RBC AUTO: 45.3 FL (ref 37–54)
EGFRCR SERPLBLD CKD-EPI 2021: 87.6 ML/MIN/1.73
ERYTHROCYTE [DISTWIDTH] IN BLOOD BY AUTOMATED COUNT: 12.6 % (ref 12.3–15.4)
GLOBULIN UR ELPH-MCNC: 2.6 GM/DL
GLUCOSE SERPL-MCNC: 127 MG/DL (ref 65–99)
HCT VFR BLD AUTO: 41.6 % (ref 37.5–51)
HGB BLD-MCNC: 13.5 G/DL (ref 13–17.7)
MAGNESIUM SERPL-MCNC: 2.4 MG/DL (ref 1.6–2.4)
MCH RBC QN AUTO: 32 PG (ref 26.6–33)
MCHC RBC AUTO-ENTMCNC: 32.5 G/DL (ref 31.5–35.7)
MCV RBC AUTO: 98.6 FL (ref 79–97)
PLATELET # BLD AUTO: 231 10*3/MM3 (ref 140–450)
PMV BLD AUTO: 9.7 FL (ref 6–12)
POTASSIUM SERPL-SCNC: 5.1 MMOL/L (ref 3.5–5.2)
PROT SERPL-MCNC: 6.6 G/DL (ref 6–8.5)
RBC # BLD AUTO: 4.22 10*6/MM3 (ref 4.14–5.8)
SODIUM SERPL-SCNC: 135 MMOL/L (ref 136–145)
WBC NRBC COR # BLD: 6.5 10*3/MM3 (ref 3.4–10.8)

## 2022-08-03 PROCEDURE — 85027 COMPLETE CBC AUTOMATED: CPT | Performed by: NURSE PRACTITIONER

## 2022-08-03 PROCEDURE — 97166 OT EVAL MOD COMPLEX 45 MIN: CPT | Performed by: OCCUPATIONAL THERAPIST

## 2022-08-03 PROCEDURE — 99232 SBSQ HOSP IP/OBS MODERATE 35: CPT | Performed by: NURSE PRACTITIONER

## 2022-08-03 PROCEDURE — 94799 UNLISTED PULMONARY SVC/PX: CPT

## 2022-08-03 PROCEDURE — 80053 COMPREHEN METABOLIC PANEL: CPT | Performed by: NURSE PRACTITIONER

## 2022-08-03 PROCEDURE — 94664 DEMO&/EVAL PT USE INHALER: CPT

## 2022-08-03 PROCEDURE — 97535 SELF CARE MNGMENT TRAINING: CPT | Performed by: OCCUPATIONAL THERAPIST

## 2022-08-03 PROCEDURE — 92610 EVALUATE SWALLOWING FUNCTION: CPT | Performed by: SPEECH-LANGUAGE PATHOLOGIST

## 2022-08-03 PROCEDURE — 97530 THERAPEUTIC ACTIVITIES: CPT

## 2022-08-03 PROCEDURE — 97162 PT EVAL MOD COMPLEX 30 MIN: CPT

## 2022-08-03 PROCEDURE — 83735 ASSAY OF MAGNESIUM: CPT | Performed by: NURSE PRACTITIONER

## 2022-08-03 RX ADMIN — FUROSEMIDE 40 MG: 40 TABLET ORAL at 08:33

## 2022-08-03 RX ADMIN — FLUOXETINE HYDROCHLORIDE 20 MG: 20 CAPSULE ORAL at 08:33

## 2022-08-03 RX ADMIN — IPRATROPIUM BROMIDE AND ALBUTEROL SULFATE 3 ML: .5; 3 SOLUTION RESPIRATORY (INHALATION) at 15:16

## 2022-08-03 RX ADMIN — GABAPENTIN 300 MG: 300 CAPSULE ORAL at 21:13

## 2022-08-03 RX ADMIN — PANTOPRAZOLE SODIUM 40 MG: 40 TABLET, DELAYED RELEASE ORAL at 06:33

## 2022-08-03 RX ADMIN — METHYLPHENIDATE HYDROCHLORIDE 10 MG: 10 TABLET ORAL at 08:34

## 2022-08-03 RX ADMIN — GABAPENTIN 300 MG: 300 CAPSULE ORAL at 08:32

## 2022-08-03 RX ADMIN — Medication 10 ML: at 21:16

## 2022-08-03 RX ADMIN — POTASSIUM CHLORIDE 20 MEQ: 10 TABLET, EXTENDED RELEASE ORAL at 08:33

## 2022-08-03 RX ADMIN — MIRABEGRON 50 MG: 25 TABLET, FILM COATED, EXTENDED RELEASE ORAL at 08:32

## 2022-08-03 RX ADMIN — POLYETHYLENE GLYCOL 3350 17 G: 17 POWDER, FOR SOLUTION ORAL at 08:34

## 2022-08-03 RX ADMIN — IPRATROPIUM BROMIDE AND ALBUTEROL SULFATE 3 ML: .5; 3 SOLUTION RESPIRATORY (INHALATION) at 23:02

## 2022-08-03 RX ADMIN — GABAPENTIN 300 MG: 300 CAPSULE ORAL at 15:57

## 2022-08-03 RX ADMIN — IPRATROPIUM BROMIDE AND ALBUTEROL SULFATE 3 ML: .5; 3 SOLUTION RESPIRATORY (INHALATION) at 07:21

## 2022-08-03 RX ADMIN — CYCLOBENZAPRINE 10 MG: 10 TABLET, FILM COATED ORAL at 13:01

## 2022-08-03 RX ADMIN — MULTIPLE VITAMINS W/ MINERALS TAB 1 TABLET: TAB at 08:33

## 2022-08-03 RX ADMIN — ROPINIROLE HYDROCHLORIDE 0.5 MG: 0.5 TABLET, FILM COATED ORAL at 21:14

## 2022-08-03 RX ADMIN — BUDESONIDE 0.5 MG: 0.5 INHALANT ORAL at 07:21

## 2022-08-03 RX ADMIN — BUDESONIDE 0.5 MG: 0.5 INHALANT ORAL at 20:04

## 2022-08-03 RX ADMIN — Medication 500 MCG: at 08:34

## 2022-08-03 RX ADMIN — HYDROCODONE BITARTRATE AND ACETAMINOPHEN 1 TABLET: 7.5; 325 TABLET ORAL at 08:34

## 2022-08-03 NOTE — PLAN OF CARE
Goal Outcome Evaluation:  Plan of Care Reviewed With: patient        Progress: improving  Outcome Evaluation: pt evaluated for OT today, pt admitted from [facility] where he lives alone and was independent PTA w his rollator. He was admitted w incr R LE pain and back pain. pt w. back brace when OOB. pt received epidural in his back which has releived some pain but still has pain in his hips. He has a previous lumbar decompression. But doesn't want anymore surgeries. Upon arrival pt in BR sitting at sink. Pt was SBA w grooming skills, min A UBD, mod LBB, SBA UBB. pt was total A to don/doff brace. Pt was min A to stand and walk back to the bed w his rollator/3 wheeled walker. pt was min A for sit to sup. pt cont to benefit from OT to incr ADL, strength, balance, and tsf.  OT wore all PPE, washed hands before/after.

## 2022-08-03 NOTE — PLAN OF CARE
Goal Outcome Evaluation:  Plan of Care Reviewed With: patient           Outcome Evaluation: Clinical swallow evaluation completed recommend continue with regular solids with thin liquids, meds whole or crushed as tolerated and small single bites and sips, avoid straws. VFSS 8/4 to further assess swallow.

## 2022-08-03 NOTE — THERAPY EVALUATION
Patient Name: Tony Leonard  : 1940    MRN: [id number]                              Today's Date: 8/3/2022       Admit Date: 2022    Visit Dx:     ICD-10-CM ICD-9-CM   1. Acute midline low back pain with right-sided sciatica  M54.41 724.2     724.3   2. Compression fracture of L3 lumbar vertebra, closed, initial encounter ([facility])  S32.030A 805.4   3. Lumbar disc herniation  M51.26 722.10   4. Spinal stenosis, lumbar region with neurogenic claudication  M48.062 724.03     Patient Active Problem List   Diagnosis   • Thrush, oral   • ADD (attention deficit disorder)   • Hemorrhoids   • Bronchiectasis with acute lower respiratory infection ([facility])   • Diverticul disease small and large intestine, no perforati or abscess   • Benign non-nodular prostatic hyperplasia without lower urinary tract symptoms   • Gastroesophageal reflux disease   • Malaise and fatigue   • Environmental allergies   • Hemoptysis   • Dyslipidemia   • Primary osteoarthritis involving multiple joints   • Pneumonia of right lower lobe due to infectious organism   • Abnormal EKG   • COPD (chronic obstructive pulmonary disease) ([facility])   • Mild malnutrition ([facility])   • Acute on chronic respiratory failure with hypoxia ([facility])   • Fracture, intertrochanteric, right femur, closed, initial encounter ([facility])   • Chronic diastolic CHF (congestive heart failure) ([facility])   • Hematoma of frontal scalp   • Postoperative anemia due to acute blood loss   • Urinary retention   • Hemorrhagic disorder due to circulating anticoagulants ([facility])   • History of fracture of right hip   • Closed fracture of right hip with routine healing   • Chronic low back pain with sciatica   • Change in bowel function   • Rectal bleeding   • Prostate cancer ([facility])   • On home oxygen therapy   • Acute viral bronchitis   • Chronic diastolic (congestive) heart failure ([facility])   • Peripheral neuropathy   • Plantar fasciitis   • COPD exacerbation ([facility])   • Bilateral lower extremity edema   •  Microscopic hematuria   • Acute midline low back pain with right-sided sciatica   • Spinal stenosis, lumbar region with neurogenic claudication   • Compression deformity of vertebra     Past Medical History:   Diagnosis Date   • ADHD (attention deficit hyperactivity disorder)    • Bronchiectasis (HCC)    • Colon polyp    • COPD (chronic obstructive pulmonary disease) ([facility])    • Diverticulosis    • Hard of hearing    • On home oxygen therapy     2 LITERS   • Pneumonia    • Prostate cancer (HCC) 04/22/2019   • Spinal stenosis, lumbar region with neurogenic claudication 08/02/2022     Past Surgical History:   Procedure Laterality Date   • BRONCHOSCOPY N/A 4/30/2016    Procedure: BRONCHOSCOPY with BAL of right lower lobe and left  lower lobe.  ;  Surgeon: Nando Diaz MD;  Location: [facility] ENDOSCOPY;  Service:    • BRONCHOSCOPY N/A 4/28/2017    Procedure: BRONCHOSCOPY;  Surgeon: Katharina Salter MD;  Location: [facility] ENDOSCOPY;  Service:    • BRONCHOSCOPY Bilateral 6/29/2017    Procedure: BRONCHOSCOPY WITH WASHINGS;  Surgeon: Katharina Salter MD;  Location: [facility] ENDOSCOPY;  Service:    • BRONCHOSCOPY N/A 1/22/2018    Procedure: BRONCHOSCOPY AT BEDSIDE with BAL;  Surgeon: Katharina Salter MD;  Location: [facility] ENDOSCOPY;  Service:    • BRONCHOSCOPY N/A 1/30/2018    Procedure: BRONCHOSCOPY with BAL and washing;  Surgeon: Shreyas Wild MD;  Location: [facility] ENDOSCOPY;  Service:    • BRONCHOSCOPY Bilateral 4/24/2018    Procedure: BRONCHOSCOPY WITH WASHING;  Surgeon: Katharina Salter MD;  Location: [facility] ENDOSCOPY;  Service: Pulmonary   • BRONCHOSCOPY N/A 12/28/2019    Procedure: BRONCHOSCOPY with bilateral washing;  Surgeon: Katharina Salter MD;  Location: [facility] ENDOSCOPY;  Service: Pulmonary   • COLONOSCOPY  05/17/2013    eh, ih, tort, sig tics   • COLONOSCOPY N/A 5/10/2019    Non-thrombosed external hemorrhoids found on perianal exam, Diverticulosis, Tortuous colon, One 5 mm polyp in the mid ascending colon, IH. Path:  Tubular adenoma.    • ENDOSCOPY  03/19/2015    z line irreg, hh   • HIP OPEN REDUCTION Right 1/17/2018    Procedure: HIP OPEN REDUCTION INTERNAL FIXATION WITH DYNAMIC HIP SCREW;  Surgeon: Les Black MD;  Location: [facility] OR;  Service:    • SPINE SURGERY     • TONSILLECTOMY     • TOTAL HIP ARTHROPLASTY REVISION Right 9/23/2019    Procedure: HIP  REVISION RIGHT;  Surgeon: Isauro Warner MD;  Location: [facility] OR;  Service: Orthopedics      General Information     Row Name 08/03/22 55          Physical Therapy Time and Intention    Document Type evaluation  -CB     Mode of Treatment individual therapy;physical therapy  -CB     Row Name 08/03/22 0855          General Information    Patient Profile Reviewed yes  -CB     Prior Level of Function independent:;gait;transfer;bed mobility  -CB     Existing Precautions/Restrictions fall;brace worn when out of bed;spinal   per chart LSO to be worn but TLSO at bedside at this time. APRN aware and RN as well.  -CB     Barriers to Rehab none identified  -CB     Row Name 08/03/22 0855          Living Environment    People in Home alone  ILF  -CB     Row Name 08/03/22 55          Home Main Entrance    Number of Stairs, Main Entrance --  elevator  -CB     Row Name 08/03/22 0855          Cognition    Orientation Status (Cognition) oriented x 3  -CB     Row Name 08/03/22 0855          Safety Issues, Functional Mobility    Safety Issues Affecting Function (Mobility) positioning of assistive device;awareness of need for assistance;safety precaution awareness;safety precautions follow-through/compliance;problem-solving;insight into deficits/self-awareness  -CB     Impairments Affecting Function (Mobility) balance;endurance/activity tolerance;strength;pain;sensation/sensory awareness;postural/trunk control  -CB           User Key  (r) = Recorded By, (t) = Taken By, (c) = Cosigned By    Initials Name Provider Type    CB Amparo Wilson PT Physical Therapist                Mobility     Row Name 08/03/22 0856          Bed Mobility    Bed Mobility supine-sit  -CB     Supine-Sit [place] (Bed Mobility) contact guard;verbal cues;nonverbal cues (demo/gesture)  -CB     Assistive Device (Bed Mobility) head of bed elevated;bed rails  -CB     Comment, (Bed Mobility) log roll technique  -CB     Row Name 08/03/22 0856          Sit-Stand Transfer    Sit-Stand [place] (Transfers) moderate assist (50% patient effort);maximum assist (25% patient effort);verbal cues  -CB     Assistive Device (Sit-Stand Transfers) walker, front-wheeled  -CB     Comment, (Sit-Stand Transfer) cues for UE placement  -CB     Row Name 08/03/22 0856          Gait/Stairs (Locomotion)    [place] Level (Gait) minimum assist (75% patient effort);verbal cues;nonverbal cues (demo/gesture)  -CB     Assistive Device (Gait) walker, front-wheeled  -CB     Distance in Feet (Gait) 5ft  -CB     Deviations/Abnormal Patterns (Gait) gait speed decreased;stride length decreased  -CB     Bilateral Gait Deviations forward flexed posture  -CB     Comment, (Gait/Stairs) unsteady on his feet and cues for upright posture  -CB           User Key  (r) = Recorded By, (t) = Taken By, (c) = Cosigned By    Initials Name Provider Type    Amparo Gutierrez PT Physical Therapist               Obj/Interventions     Row Name 08/03/22 0857          Range of Motion Comprehensive    General Range of Motion bilateral lower extremity ROM WFL  -CB     Row Name 08/03/22 0857          Strength Comprehensive (MMT)    General Manual Muscle Testing (MMT) Assessment lower extremity strength deficits identified  -CB     Row Name 08/03/22 0857          Balance    Balance Assessment standing static balance;standing dynamic balance;sitting dynamic balance;sitting static balance  -CB     Static Sitting Balance standby assist  -CB     Dynamic Sitting Balance standby assist  -CB     Position, Sitting Balance sitting edge of bed  -CB     Static Standing Balance  minimal assist;verbal cues  -CB     Dynamic Standing Balance minimal assist;verbal cues  -CB     Position/Device Used, Standing Balance supported;walker, front-wheeled  -CB     Balance Interventions sitting;standing;sit to stand;supported;static;dynamic;minimal challenge  -CB     Row Name 08/03/22 0857          Sensory Assessment (Somatosensory)    Sensory Assessment (Somatosensory) --  pt reports shooting pain into R quad region and reports increased after TLSO donned; APRN notified.  -CB           User Key  (r) = Recorded By, (t) = Taken By, (c) = Cosigned By    Initials Name Provider Type    CB Amparo Wilson, PT Physical Therapist               Goals/Plan     Row Name 08/03/22 0902          Bed Mobility Goal 1 (PT)    Activity/Assistive Device (Bed Mobility Goal 1, PT) bed mobility activities, all  -CB     [place] Level/Cues Needed (Bed Mobility Goal 1, PT) standby assist  -CB     Time Frame (Bed Mobility Goal 1, PT) long term goal (LTG);1 week  -CB     Row Name 08/03/22 0902          Transfer Goal 1 (PT)    Activity/Assistive Device (Transfer Goal 1, PT) sit-to-stand/stand-to-sit;bed-to-chair/chair-to-bed  -CB     [place] Level/Cues Needed (Transfer Goal 1, PT) contact guard required  -CB     Time Frame (Transfer Goal 1, PT) long term goal (LTG);1 week  -CB     Row Name 08/03/22 0902          Gait Training Goal 1 (PT)    Activity/Assistive Device (Gait Training Goal 1, PT) gait (walking locomotion);walker, rolling  -CB     [place] Level (Gait Training Goal 1, PT) contact guard required  -CB     Distance (Gait Training Goal 1, PT) 50ft  -CB     Time Frame (Gait Training Goal 1, PT) long term goal (LTG);1 week  -CB     Row Name 08/03/22 0902          Therapy Assessment/Plan (PT)    Planned Therapy Interventions (PT) balance training;bed mobility training;gait training;home exercise program;patient/family education;strengthening;transfer training;postural re-education;orthotic fitting/training  -CB            User Key  (r) = Recorded By, (t) = Taken By, (c) = Cosigned By    Initials Name Provider Type    Amparo Gutierrez, PT Physical Therapist               Clinical Impression     Row Name 08/03/22 0858          Pain    Pre/Posttreatment Pain Comment 3/10 back pain and 10/10 R hip/leg pain  -CB     Pain Intervention(s) Repositioned;Ambulation/increased activity  -CB     Row Name 08/03/22 0858          Plan of Care Review    Plan of Care Reviewed With patient  -CB     Outcome Evaluation Patient is an 81 yo male who presented with low back pain. Pt with L3 compression fx and chronic L1 compression fx. PMHx inlcudes COPD, prostate CA, and laminectomy. Pt is s/p epidural and is to wear LSO when OOB. TLSO at bedside today and APRN aware TLSO at bedside not LSO. He lives alone in [facility] and uses 3 wheeled walker at baseline for mobility. He reports 10/10 R hip and leg pain and 3/10 low back pain. Today he presents with increased pain, balance deficits, strength deficits, and overall functional decline. He required mod-maxA for STS and ambulated 5ft using rwx with Alejandra. Pt will continue to benefit from skilled PT to address functional deficits. PT rec SNFat dc.  -CB     Row Name 08/03/22 0858          Therapy Assessment/Plan (PT)    Rehab Potential (PT) good, to achieve stated therapy goals  -CB     Criteria for Skilled Interventions Met (PT) yes  -CB     Therapy Frequency (PT) 5 times/wk  -CB     Row Name 08/03/22 0858          Vital Signs    Recovery Time all VSS  -CB     Row Name 08/03/22 0858          Positioning and Restraints    Pre-Treatment Position in bed  -CB     Post Treatment Position chair  -CB     In Chair notified nsg;sitting;call light within reach;encouraged to call for assist;exit alarm on  -CB           User Key  (r) = Recorded By, (t) = Taken By, (c) = Cosigned By    Initials Name Provider Type    Amparo Gutierrez, PT Physical Therapist               Outcome Measures     Row Name 08/03/22 0902           How much help from another person do you currently need...    Turning from your back to your side while in flat bed without using bedrails? 3  -CB     Moving from lying on back to sitting on the side of a flat bed without bedrails? 3  -CB     Moving to and from a bed to a chair (including a wheelchair)? 3  -CB     Standing up from a chair using your arms (e.g., wheelchair, bedside chair)? 2  -CB     Climbing 3-5 steps with a railing? 2  -CB     To walk in hospital room? 3  -CB     AM-PAC 6 Clicks Score (PT) 16  -CB     Highest level of mobility 5 --> Static standing  -CB     Row Name 08/03/22 0902          Functional Assessment    Outcome Measure Options AM-PAC 6 Clicks Basic Mobility (PT)  -CB           User Key  (r) = Recorded By, (t) = Taken By, (c) = Cosigned By    Initials Name Provider Type    CB Amparo Wilson, PT Physical Therapist                             Physical Therapy Education                 Title: PT OT SLP Therapies (Done)     Topic: Physical Therapy (Done)     Point: Mobility training (Done)     Learning Progress Summary           Patient Acceptance, E,TB, VU,NR by CB at 8/3/2022 0903                   Point: Home exercise program (Done)     Learning Progress Summary           Patient Acceptance, E,TB, VU,NR by CB at 8/3/2022 0903                   Point: Body mechanics (Done)     Learning Progress Summary           Patient Acceptance, E,TB, VU,NR by CB at 8/3/2022 0903                   Point: Precautions (Done)     Learning Progress Summary           Patient Acceptance, E,TB, VU,NR by CB at 8/3/2022 0903                               User Key     Initials Effective Dates Name Provider Type Discipline    CB 10/22/21 -  Amparo Wilson, PT Physical Therapist PT              PT Recommendation and Plan  Planned Therapy Interventions (PT): balance training, bed mobility training, gait training, home exercise program, patient/family education, strengthening, transfer training, postural  re-education, orthotic fitting/training  Plan of Care Reviewed With: patient  Outcome Evaluation: Patient is an 83 yo male who presented with low back pain. Pt with L3 compression fx and chronic L1 compression fx. PMHx inlcudes COPD, prostate CA, and laminectomy. Pt is s/p epidural and is to wear LSO when OOB. TLSO at bedside today and APRN aware TLSO at bedside not LSO. He lives alone in [facility] and uses 3 wheeled walker at baseline for mobility. He reports 10/10 R hip and leg pain and 3/10 low back pain. Today he presents with increased pain, balance deficits, strength deficits, and overall functional decline. He required mod-maxA for STS and ambulated 5ft using rwx with Alejandra. Pt will continue to benefit from skilled PT to address functional deficits. PT rec SNFat dc.     Time Calculation:    PT Charges     Row Name 08/03/22 0904             Time Calculation    Start Time 0806  -CB      Stop Time 0820  -CB      Time Calculation (min) 14 min  -CB      PT Received On 08/03/22  -CB      PT - Next Appointment 08/04/22  -CB      PT Goal Re-Cert Due Date 08/10/22  -CB              Time Calculation- PT    Total Timed Code Minutes- PT 8 minute(s)  -CB              Timed Charges    46185 - PT Therapeutic Activity Minutes 8  -CB              Total Minutes    Timed Charges Total Minutes 8  -CB       Total Minutes 8  -CB            User Key  (r) = Recorded By, (t) = Taken By, (c) = Cosigned By    Initials Name Provider Type    CB Amparo Wilson, PT Physical Therapist              Therapy Charges for Today     Code Description Service Date Service Provider Modifiers Qty    11239887057 HC PT THERAPEUTIC ACT EA 15 MIN 8/3/2022 Amparo Wilson, PT GP 1    95143032116 HC PT EVAL MOD COMPLEXITY 3 8/3/2022 Amparo Wilson, PT GP 1          PT G-Codes  Outcome Measure Options: AM-PAC 6 Clicks Basic Mobility (PT)  AM-PAC 6 Clicks Score (PT): 16    Amparo Wilson PT  8/3/2022

## 2022-08-03 NOTE — THERAPY EVALUATION
Acute Care - Speech Language Pathology   Swallow Initial Evaluation [facility]     Patient Name: Tony Leonard  : 1940  MRN: [id number]  Today's Date: 8/3/2022               Admit Date: 2022    Visit Dx:     ICD-10-CM ICD-9-CM   1. Acute midline low back pain with right-sided sciatica  M54.41 724.2     724.3   2. Compression fracture of L3 lumbar vertebra, closed, initial encounter (Formerly [facility])  S32.030A 805.4   3. Lumbar disc herniation  M51.26 722.10   4. Spinal stenosis, lumbar region with neurogenic claudication  M48.062 724.03     Patient Active Problem List   Diagnosis   • Thrush, oral   • ADD (attention deficit disorder)   • Hemorrhoids   • Bronchiectasis with acute lower respiratory infection (Formerly [facility])   • Diverticul disease small and large intestine, no perforati or abscess   • Benign non-nodular prostatic hyperplasia without lower urinary tract symptoms   • Gastroesophageal reflux disease   • Malaise and fatigue   • Environmental allergies   • Hemoptysis   • Dyslipidemia   • Primary osteoarthritis involving multiple joints   • Pneumonia of right lower lobe due to infectious organism   • Abnormal EKG   • COPD (chronic obstructive pulmonary disease) (Formerly [facility])   • Mild malnutrition (Formerly [facility])   • Acute on chronic respiratory failure with hypoxia (Formerly [facility])   • Fracture, intertrochanteric, right femur, closed, initial encounter (Formerly [facility])   • Chronic diastolic CHF (congestive heart failure) (Formerly [facility])   • Hematoma of frontal scalp   • Postoperative anemia due to acute blood loss   • Urinary retention   • Hemorrhagic disorder due to circulating anticoagulants (Formerly [facility])   • History of fracture of right hip   • Closed fracture of right hip with routine healing   • Chronic low back pain with sciatica   • Change in bowel function   • Rectal bleeding   • Prostate cancer (Formerly [facility])   • On home oxygen therapy   • Acute viral bronchitis   • Chronic diastolic (congestive) heart failure ([facility]ly [facility])   • Peripheral neuropathy   • Plantar fasciitis   •  COPD exacerbation (HCC)   • Bilateral lower extremity edema   • Microscopic hematuria   • Acute midline low back pain with right-sided sciatica   • Spinal stenosis, lumbar region with neurogenic claudication   • Compression deformity of vertebra     Past Medical History:   Diagnosis Date   • ADHD (attention deficit hyperactivity disorder)    • Bronchiectasis (HCC)    • Colon polyp    • COPD (chronic obstructive pulmonary disease) (HCC)    • Diverticulosis    • Hard of hearing    • On home oxygen therapy     2 LITERS   • Pneumonia    • Prostate cancer (HCC) 04/22/2019   • Spinal stenosis, lumbar region with neurogenic claudication 08/02/2022     Past Surgical History:   Procedure Laterality Date   • BRONCHOSCOPY N/A 4/30/2016    Procedure: BRONCHOSCOPY with BAL of right lower lobe and left  lower lobe.  ;  Surgeon: Nando Diaz MD;  Location: [facility] ENDOSCOPY;  Service:    • BRONCHOSCOPY N/A 4/28/2017    Procedure: BRONCHOSCOPY;  Surgeon: Katharina Salter MD;  Location: [facility] ENDOSCOPY;  Service:    • BRONCHOSCOPY Bilateral 6/29/2017    Procedure: BRONCHOSCOPY WITH WASHINGS;  Surgeon: Katharina Salter MD;  Location: [facility] ENDOSCOPY;  Service:    • BRONCHOSCOPY N/A 1/22/2018    Procedure: BRONCHOSCOPY AT BEDSIDE with BAL;  Surgeon: Katharina Salter MD;  Location: [facility] ENDOSCOPY;  Service:    • BRONCHOSCOPY N/A 1/30/2018    Procedure: BRONCHOSCOPY with BAL and washing;  Surgeon: Shreyas Wild MD;  Location: [facility] ENDOSCOPY;  Service:    • BRONCHOSCOPY Bilateral 4/24/2018    Procedure: BRONCHOSCOPY WITH WASHING;  Surgeon: Katharina Salter MD;  Location: [facility] ENDOSCOPY;  Service: Pulmonary   • BRONCHOSCOPY N/A 12/28/2019    Procedure: BRONCHOSCOPY with bilateral washing;  Surgeon: Katharina Salter MD;  Location: [facility] ENDOSCOPY;  Service: Pulmonary   • COLONOSCOPY  05/17/2013    eh, ih, tort, sig tics   • COLONOSCOPY N/A 5/10/2019    Non-thrombosed external hemorrhoids found on perianal exam, Diverticulosis, Tortuous  colon, One 5 mm polyp in the mid ascending colon, IH. Path: Tubular adenoma.    • ENDOSCOPY  03/19/2015    z line irreg, hh   • HIP OPEN REDUCTION Right 1/17/2018    Procedure: HIP OPEN REDUCTION INTERNAL FIXATION WITH DYNAMIC HIP SCREW;  Surgeon: Les Black MD;  Location: [facility];  Service:    • SPINE SURGERY     • TONSILLECTOMY     • TOTAL HIP ARTHROPLASTY REVISION Right 9/23/2019    Procedure: HIP  REVISION RIGHT;  Surgeon: Isauro Warner MD;  Location: [facility];  Service: Orthopedics       SLP Recommendation and Plan  SLP Swallowing Diagnosis: R/O pharyngeal dysphagia, esophageal dysphagia (08/03/22 1500)  SLP Diet Recommendation: regular textures, thin liquids (08/03/22 1500)  Recommended Precautions and Strategies: upright posture during/after eating, small bites of food and sips of liquid, no straw (08/03/22 1500)  SLP Rec. for Method of Medication Administration: meds whole, meds crushed, as tolerated (08/03/22 1500)     Monitor for Signs of Aspiration: yes, notify SLP if any concerns (08/03/22 1500)  Recommended Diagnostics: VFSS (MBS) (08/03/22 1500)  Swallow Criteria for Skilled Therapeutic Interventions Met: demonstrates skilled criteria (08/03/22 1500)  Anticipated Discharge Disposition (SLP): unknown (08/03/22 1500)  Rehab Potential/Prognosis, Swallowing: good, to achieve stated therapy goals (08/03/22 1500)  Therapy Frequency (Swallow): PRN (08/03/22 1500)  Predicted Duration Therapy Intervention (Days): until discharge (08/03/22 1500)                                  Plan of Care Reviewed With: patient  Outcome Evaluation: Clinical swallow evaluation completed recommend continue with regular solids with thin liquids, meds whole or crushed as tolerated and small single bites and sips, avoid straws. VFSS 8/4 to further assess swallow.      SWALLOW EVALUATION (last 72 hours)     SLP Adult Swallow Evaluation     Row Name 08/03/22 1500                   Rehab Evaluation    Document  Type evaluation  -KA        Subjective Information no complaints  -KA        Patient Observations alert;cooperative  -KA        Patient Effort good  -KA        Symptoms Noted During/After Treatment none  -KA                  General Information    Patient Profile Reviewed yes  -KA        Pertinent History Of Current Problem patient admitted with back pain, hx of CHF and COPD. SLP consult due to patietn coughing with PO. VFSS in 2017=WNL recommended regular solids with thin liquids  -KA        Current Method of Nutrition regular textures;thin liquids  -KA        Precautions/Limitations, Vision WFL;for purposes of eval  -KA        Precautions/Limitations, Hearing WFL;for purposes of eval  -KA        Prior Level of Function-Communication WFL  -KA        Prior Level of Function-Swallowing no diet consistency restrictions  -KA        Plans/Goals Discussed with patient  -KA        Barriers to Rehab none identified  -KA        Patient's Goals for Discharge eat/drink without coughing/choking  -KA                  Pain Scale: Numbers Pre/Post-Treatment    Pretreatment Pain Rating 0/10 - no pain  -KA        Posttreatment Pain Rating 0/10 - no pain  -KA                  Oral Motor Structure and Function    Dentition Assessment natural, present and adequate  -KA        Secretion Management WNL/WFL  -KA        Mucosal Quality moist, healthy  -KA        Volitional Swallow WFL  -KA        Volitional Cough WFL  -KA                  Oral Musculature and Cranial Nerve Assessment    Oral Motor General Assessment WFL  -KA                  General Eating/Swallowing Observations    Respiratory Support Currently in Use room air  -KA        Eating/Swallowing Skills self-fed  -KA        Positioning During Eating upright in bed  -KA        Utensils Used spoon;cup;straw  -KA        Consistencies Trialed regular textures;soft textures;chopped;mixed consistency;pureed;thin liquids  -KA        Pre SpO2 (%) 98  -KA        Post SpO2 (%) 98  -KA     "              Clinical Swallow Eval    Clinical Swallow Evaluation Summary Patient demonstrated x1 cough after thin liquids via straw and x1 cough after mechanical soft trials, no overt s/s of pen/asp with thins via cup, puree and regular solids. Laryngeal elevation felt age appropriate and mastication functional. Patient c/o food/pills feeling stuck in throat sometimes. He reports usually liquid wash will help wash the pill or food down, however sometimes \"it comes back up into my mouth.\" patient denies symptoms worsening and reports this has been ongoing for years. Discussed options with patient, patient wishes to proceed with VFSS to further assess swallow.  -KA                  SLP Evaluation Clinical Impression    SLP Swallowing Diagnosis R/O pharyngeal dysphagia;esophageal dysphagia  -KA        Functional Impact risk of aspiration/pneumonia  -KA        Rehab Potential/Prognosis, Swallowing good, to achieve stated therapy goals  -        Swallow Criteria for Skilled Therapeutic Interventions Met demonstrates skilled criteria  -KA                  Recommendations    Therapy Frequency (Swallow) PRN  -KA        Predicted Duration Therapy Intervention (Days) until discharge  -KA        SLP Diet Recommendation regular textures;thin liquids  -KA        Recommended Diagnostics VFSS (MBS)  -KA        Recommended Precautions and Strategies upright posture during/after eating;small bites of food and sips of liquid;no straw  -KA        Oral Care Recommendations Oral Care BID/PRN  -KA        SLP Rec. for Method of Medication Administration meds whole;meds crushed;as tolerated  -KA        Monitor for Signs of Aspiration yes;notify SLP if any concerns  -KA        Anticipated Discharge Disposition (SLP) unknown  -KA              User Key  (r) = Recorded By, (t) = Taken By, (c) = Cosigned By    Initials Name Effective Dates    Nitin Whitten MA,[facility]-SLP 06/02/22 -                 EDUCATION  The patient has been educated " in the following areas:   Dysphagia (Swallowing Impairment).              Time Calculation:    Time Calculation- SLP     Row Name 08/03/22 1530             Time Calculation- SLP    SLP Start Time 1245  -KA      SLP Received On 08/03/22  -KA              Untimed Charges    SLP Eval/Re-eval  ST Eval Oral Pharyng Swallow - 91476  -KA      30461-IT Eval Oral Pharyng Swallow Minutes 60  -KA              Total Minutes    Untimed Charges Total Minutes 60  -KA       Total Minutes 60  -KA            User Key  (r) = Recorded By, (t) = Taken By, (c) = Cosigned By    Initials Name Provider Type    Nitin Whitten MA,CCC-SLP Speech and Language Pathologist                Therapy Charges for Today     Code Description Service Date Service Provider Modifiers Qty    [id number]  ST EVAL ORAL PHARYNG SWALLOW 4 8/3/2022 Nitin Ballard MA,CCC-SLP GN 1               Nitin Ballard MA,CCC-SLP  8/3/2022

## 2022-08-03 NOTE — DISCHARGE PLACEMENT REQUEST
"Tony Casey (82 y.o. Male)             Date of Birth   1940    Social Security Number       Address   [address] [place] [address]    Home Phone   [phone]    MRN   [id number]       Hindu   Latter day    Marital Status   Single                            Admission Date   8/1/22    Admission Type   Emergency    Admitting Provider   Elizabet Parra MD    Attending Provider   Alberto Tomlinson MD    Department, Room/Bed   [address], P576/1       Discharge Date       Discharge Disposition       Discharge Destination                               Attending Provider: Alberto Tomlinson MD    Allergies: Daliresp [Roflumilast], Latex, Sulfa Antibiotics    Isolation: None   Infection: None   Code Status: CPR   Advance Care Planning Activity    Ht: 180.3 cm (71\")   Wt: 81.7 kg (180 lb 1.9 oz)    Admission Cmt: None   Principal Problem: Spinal stenosis, lumbar region with neurogenic claudication [M48.062]                 Active Insurance as of 8/1/2022     Primary Coverage     Payor Plan Insurance Group Employer/Plan Group    MEDICARE MEDICARE A & B      Payor Plan Address Payor Plan Phone Number Payor Plan Fax Number Effective Dates    [address] [phone]  5/1/2003 - None Entered    [facility] [id number]       Subscriber Name Subscriber Birth Date Member ID       TONY CASEY 1940 [id number]           Secondary Coverage     Payor Plan Insurance Group Employer/Plan Group     FOR LIFE  FOR LIFE [id number]       Payor Plan Address Payor Plan Phone Number Payor Plan Fax Number Effective Dates    [address] [phone]  3/10/2016 - None Entered    [facility] [phone]       Subscriber Name Subscriber Birth Date Member ID       TONY CASEY 1940 [id number]                 Emergency Contacts      (Rel.) Home Phone Work Phone Mobile Phone    HoracioRicardo isaac (Son) [phone] -- [phone]    Frederick Casey (Son) [phone] " -- --    VICKY CASEY -- -- [phone]

## 2022-08-03 NOTE — PLAN OF CARE
Goal Outcome Evaluation:  Plan of Care Reviewed With: patient           Outcome Evaluation: Patient is an 83 yo male who presented with low back pain. Pt with L3 compression fx and chronic L1 compression fx. PMHx inlcudes COPD, prostate CA, and laminectomy. Pt is s/p epidural and is to wear LSO when OOB. TLSO at bedside today and APRN aware TLSO at bedside not LSO. He lives alone in [facility] and uses 3 wheeled walker at baseline for mobility. He reports 10/10 R hip and leg pain and 3/10 low back pain. Today he presents with increased pain, balance deficits, strength deficits, and overall functional decline. He required mod-maxA for STS and ambulated 5ft using rwx with Alejandra. Pt will continue to benefit from skilled PT to address functional deficits. PT rec SNFat dc.

## 2022-08-03 NOTE — PROGRESS NOTES
LOS: 2 days   Patient Care Team:  Erich Aviles MD as PCP - General (Internal Medicine)  Katharina Salter MD as Consulting Physician (Pulmonary Disease)  Anum Bhatt MD as Consulting Physician (Pain Medicine)    Chief Complaint: Right leg pain, back pain    Subjective   Lumbar epidural yesterday.  Patient reports no improvement in right lower extremity radicular pain.  Does state back pain has improved.  PT at bedside to work with patient.  LSO brace at bedside as well.      Interval History:     History taken from: patient chart    Objective      Alert and oriented  Sensation intact  No obvious motor deficits in lower extremities on exam  Denies saddle anesthesia  Respiratory effort normal  Speech clear  No calf tenderness  Minimal low back pain upon deep palpation of the lumbar spine    Vital Signs  Temp:  [97.7 °F (36.5 °C)-98.2 °F (36.8 °C)] 97.9 °F (36.6 °C)  Heart Rate:  [55-76] 55  Resp:  [16-20] 18  BP: (121-147)/(66-77) 125/66       Results from last 7 days   Lab Units 08/02/22  0727   WBC 10*3/mm3 6.94   HEMOGLOBIN g/dL 11.5*   HEMATOCRIT % 34.2*   PLATELETS 10*3/mm3 215     Results from last 7 days   Lab Units 08/02/22  0727   SODIUM mmol/L 139   POTASSIUM mmol/L 4.1   CHLORIDE mmol/L 105   CO2 mmol/L 24.8   BUN mg/dL 23   CREATININE mg/dL 0.77   GLUCOSE mg/dL 94   CALCIUM mg/dL 8.7       Assessment & Plan       Spinal stenosis, lumbar region with neurogenic claudication    Benign non-nodular prostatic hyperplasia without lower urinary tract symptoms    COPD (chronic obstructive pulmonary disease) ([facility])    Chronic diastolic CHF (congestive heart failure) ([facility])    Compression deformity of vertebra      Witnessed PT ambulate patient with rolling walker and brace to the chair.  Unfortunately the wrong brace was delivered, we will have [facility] adjust the TLSO to make it more of an LSO brace as ordered.  He does continue to complain of pain in the right proximal leg but back pain is less  significant.  With reported improvement in low back pain, leaning away from kyphoplasty at this point.  Still early from RENITA.  Would consider short course of steroids if RENITA does not offer relief in the next 24 to 48 hours.  Risk of more invasive lumbar surgery likely outweighs benefit, but will continue to monitor.  Consider rehab evaluation as patient lives alone although does have family who checks on him often.     PABLITO Banks  08/03/22  08:04 EDT

## 2022-08-03 NOTE — THERAPY EVALUATION
Patient Name: Tony Leonard  : 1940    MRN: [id number]                              Today's Date: 8/3/2022       Admit Date: 2022    Visit Dx:     ICD-10-CM ICD-9-CM   1. Acute midline low back pain with right-sided sciatica  M54.41 724.2     724.3   2. Compression fracture of L3 lumbar vertebra, closed, initial encounter ([facility])  S32.030A 805.4   3. Lumbar disc herniation  M51.26 722.10   4. Spinal stenosis, lumbar region with neurogenic claudication  M48.062 724.03     Patient Active Problem List   Diagnosis   • Thrush, oral   • ADD (attention deficit disorder)   • Hemorrhoids   • Bronchiectasis with acute lower respiratory infection ([facility])   • Diverticul disease small and large intestine, no perforati or abscess   • Benign non-nodular prostatic hyperplasia without lower urinary tract symptoms   • Gastroesophageal reflux disease   • Malaise and fatigue   • Environmental allergies   • Hemoptysis   • Dyslipidemia   • Primary osteoarthritis involving multiple joints   • Pneumonia of right lower lobe due to infectious organism   • Abnormal EKG   • COPD (chronic obstructive pulmonary disease) ([facility])   • Mild malnutrition ([facility])   • Acute on chronic respiratory failure with hypoxia ([facility])   • Fracture, intertrochanteric, right femur, closed, initial encounter ([facility])   • Chronic diastolic CHF (congestive heart failure) ([facility])   • Hematoma of frontal scalp   • Postoperative anemia due to acute blood loss   • Urinary retention   • Hemorrhagic disorder due to circulating anticoagulants ([facility])   • History of fracture of right hip   • Closed fracture of right hip with routine healing   • Chronic low back pain with sciatica   • Change in bowel function   • Rectal bleeding   • Prostate cancer ([facility])   • On home oxygen therapy   • Acute viral bronchitis   • Chronic diastolic (congestive) heart failure ([facility])   • Peripheral neuropathy   • Plantar fasciitis   • COPD exacerbation ([facility])   • Bilateral lower extremity edema   •  Microscopic hematuria   • Acute midline low back pain with right-sided sciatica   • Spinal stenosis, lumbar region with neurogenic claudication   • Compression deformity of vertebra     Past Medical History:   Diagnosis Date   • ADHD (attention deficit hyperactivity disorder)    • Bronchiectasis (HCC)    • Colon polyp    • COPD (chronic obstructive pulmonary disease) (HCC)    • Diverticulosis    • Hard of hearing    • On home oxygen therapy     2 LITERS   • Pneumonia    • Prostate cancer (HCC) 04/22/2019   • Spinal stenosis, lumbar region with neurogenic claudication 08/02/2022     Past Surgical History:   Procedure Laterality Date   • BRONCHOSCOPY N/A 4/30/2016    Procedure: BRONCHOSCOPY with BAL of right lower lobe and left  lower lobe.  ;  Surgeon: Nando Diaz MD;  Location: [facility] ENDOSCOPY;  Service:    • BRONCHOSCOPY N/A 4/28/2017    Procedure: BRONCHOSCOPY;  Surgeon: Katharina Salter MD;  Location: [facility] ENDOSCOPY;  Service:    • BRONCHOSCOPY Bilateral 6/29/2017    Procedure: BRONCHOSCOPY WITH WASHINGS;  Surgeon: Katharina Salter MD;  Location: [facility] ENDOSCOPY;  Service:    • BRONCHOSCOPY N/A 1/22/2018    Procedure: BRONCHOSCOPY AT BEDSIDE with BAL;  Surgeon: Katharina Salter MD;  Location: [facility] ENDOSCOPY;  Service:    • BRONCHOSCOPY N/A 1/30/2018    Procedure: BRONCHOSCOPY with BAL and washing;  Surgeon: Shreyas Wild MD;  Location: [facility] ENDOSCOPY;  Service:    • BRONCHOSCOPY Bilateral 4/24/2018    Procedure: BRONCHOSCOPY WITH WASHING;  Surgeon: Katharina Salter MD;  Location: [facility] ENDOSCOPY;  Service: Pulmonary   • BRONCHOSCOPY N/A 12/28/2019    Procedure: BRONCHOSCOPY with bilateral washing;  Surgeon: Katharina Salter MD;  Location: [facility] ENDOSCOPY;  Service: Pulmonary   • COLONOSCOPY  05/17/2013    eh, ih, tort, sig tics   • COLONOSCOPY N/A 5/10/2019    Non-thrombosed external hemorrhoids found on perianal exam, Diverticulosis, Tortuous colon, One 5 mm polyp in the mid ascending colon, IH. Path:  Tubular adenoma.    • ENDOSCOPY  03/19/2015    z line irreg, hh   • HIP OPEN REDUCTION Right 1/17/2018    Procedure: HIP OPEN REDUCTION INTERNAL FIXATION WITH DYNAMIC HIP SCREW;  Surgeon: Les Black MD;  Location: [facility] OR;  Service:    • SPINE SURGERY     • TONSILLECTOMY     • TOTAL HIP ARTHROPLASTY REVISION Right 9/23/2019    Procedure: HIP  REVISION RIGHT;  Surgeon: Isauro Warner MD;  Location: [facility] OR;  Service: Orthopedics      General Information     Row Name 08/03/22 1201          OT Time and Intention    Document Type evaluation;therapy note (daily note)  -     Mode of Treatment individual therapy;occupational therapy  -     Row Name 08/03/22 1201          General Information    Patient Profile Reviewed yes  -     Prior Level of Function independent:;community mobility;gait;transfer;all household mobility;ADL's;bed mobility  -     Existing Precautions/Restrictions fall;spinal;brace worn when out of bed  -     Barriers to Rehab none identified  -     Row Name 08/03/22 1201          Living Environment    People in Home alone  -     Row Name 08/03/22 1201          Cognition    Orientation Status (Cognition) oriented x 3  -     Row Name 08/03/22 1201          Safety Issues, Functional Mobility    Safety Issues Affecting Function (Mobility) safety precaution awareness;positioning of assistive device  -     Impairments Affecting Function (Mobility) balance;endurance/activity tolerance;strength;pain  -           User Key  (r) = Recorded By, (t) = Taken By, (c) = Cosigned By    Initials Name Provider Type     Yamilet De Paz, OTR Occupational Therapist                 Mobility/ADL's     Row Name 08/03/22 1203          Bed Mobility    Bed Mobility sit-supine  -     Sit-Supine [place] (Bed Mobility) set up;minimum assist (75% patient effort);verbal cues  -     Assistive Device (Bed Mobility) bed rails  -     Row Name 08/03/22 1203          Transfers     Transfers sit-stand transfer;stand-sit transfer  -     Comment, (Transfers) chair to bed CGA and rollator  -     Sit-Stand [place] (Transfers) set up;verbal cues;minimum assist (75% patient effort)  -     Stand-Sit [place] (Transfers) set up;verbal cues;minimum assist (75% patient effort)  -     Row Name 08/03/22 1203          Sit-Stand Transfer    Assistive Device (Sit-Stand Transfers) walker, 4-wheeled  -     Row Name 08/03/22 1203          Stand-Sit Transfer    Assistive Device (Stand-Sit Transfers) walker, 4-wheeled  -     Row Name 08/03/22 1203          Functional Mobility    Functional Mobility- Ind. Level minimum assist (75% patient effort)  -     Functional Mobility- Device walker, 4-wheeled  -     Functional Mobility- Comment in BR to bed from BR  -[facility] Name 08/03/22 1203          Activities of Daily Living    BADL Assessment/Intervention bathing;upper body dressing;grooming;toileting  -KP     Row Name 08/03/22 1203          Bathing Assessment/Intervention    [place] Level (Bathing) bathing skills;set up;minimum assist (75% patient effort);moderate assist (50% patient effort);lower body;upper body;standby assist  -     Position (Bathing) sink side;supported sitting;supported standing  -KP     Row Name 08/03/22 1203          Upper Body Dressing Assessment/Training    [place] Level (Upper Body Dressing) upper body dressing skills;doff;don;pajama/robe;set up;minimum assist (75% patient effort)  [facility]     Position (Upper Body Dressing) supported sitting  -KP     Row Name 08/03/22 1203          Grooming Assessment/Training    [place] Level (Grooming) grooming skills;wash face, hands;oral care regimen;set up;standby assist  -     Position (Grooming) sink side;supported sitting  -KP     Row Name 08/03/22 1203          Toileting Assessment/Training    [place] Level (Toileting) toileting skills;adjust/manage clothing;perform perineal hygiene;minimum assist (75%  patient effort)  -KP     Position (Toileting) supported sitting;supported standing  -KP           User Key  (r) = Recorded By, (t) = Taken By, (c) = Cosigned By    Initials Name Provider Type    Yamilet Mccray OTR Occupational Therapist               Obj/Interventions     Row Name 08/03/22 1207          Vision Assessment/Intervention    Visual Impairment/Limitations WFL  -KP     Row Name 08/03/22 1207          Range of Motion Comprehensive    General Range of Motion bilateral upper extremity ROM WNL  -KP     Comment, General Range of Motion B UE 8/8  -KP     Row Name 08/03/22 1207          Strength Comprehensive (MMT)    Comment, General Manual Muscle Testing (MMT) Assessment B UE 4-/5  -KP     Row Name 08/03/22 1207          Motor Skills    Motor Skills coordination;functional endurance  -     Coordination WFL  -     Functional Endurance good  -KP     Row Name 08/03/22 1207          Balance    Balance Assessment standing static balance;sitting static balance  -KP     Static Sitting Balance set-up;standby assist  -     Dynamic Sitting Balance set-up;standby assist  -     Position, Sitting Balance sitting in chair  -KP     Static Standing Balance set-up;contact guard;minimal assist  -     Position/Device Used, Standing Balance supported;walker, 4-wheeled  -KP     Balance Interventions sitting;sit to stand;supported;static;dynamic;occupation based/functional task;standing  -KP     Comment, Balance pt completed bathing skills at sink sitting and standing CGA/min when standing  -KP           User Key  (r) = Recorded By, (t) = Taken By, (c) = Cosigned By    Initials Name Provider Type    Yamilet Mccray OTR Occupational Therapist               Goals/Plan     Row Name 08/03/22 1216          Transfer Goal 1 (OT)    Activity/Assistive Device (Transfer Goal 1, OT) bed-to-chair/chair-to-bed;sit-to-stand/stand-to-sit;toilet  -KP     [place] Level/Cues Needed (Transfer Goal 1, OT) standby  assist  -KP     Time Frame (Transfer Goal 1, OT) short term goal (STG);2 weeks  -KP     Progress/Outcome (Transfer Goal 1, OT) goal ongoing  -     Row Name 08/03/22 1216          Bathing Goal 1 (OT)    Activity/Device (Bathing Goal 1, OT) bathing skills, all;long-handled sponge;reacher;grab bar, tub/shower;hand-held shower spray hose  -     [place] Level/Cues Needed (Bathing Goal 1, OT) standby assist  -KP     Time Frame (Bathing Goal 1, OT) short term goal (STG);2 weeks  -KP     Progress/Outcomes (Bathing Goal 1, OT) goal ongoing  -     Row Name 08/03/22 1216          Strength Goal 1 (OT)    Strength Goal 1 (OT) incr UE to 4/5  -KP     Time Frame (Strength Goal 1, OT) short term goal (STG);2 weeks  -KP     Progress/Outcome (Strength Goal 1, OT) goal ongoing  -     Row Name 08/03/22 1216          Therapy Assessment/Plan (OT)    Planned Therapy Interventions (OT) activity tolerance training;functional balance retraining;occupation/activity based interventions;transfer/mobility retraining;strengthening exercise;ROM/therapeutic exercise;BADL retraining  -           User Key  (r) = Recorded By, (t) = Taken By, (c) = Cosigned By    Initials Name Provider Type    Yamilet Mccray, OTR Occupational Therapist               Clinical Impression     Row Name 08/03/22 1209          Pain Assessment    Pretreatment Pain Rating 4/10  -KP     Posttreatment Pain Rating 4/10  -KP     Pain Location - Side/Orientation Bilateral  -     Pain Location lower  -     Pain Location - back  -     Pain Intervention(s) Repositioned  -     Row Name 08/03/22 1209          Plan of Care Review    Plan of Care Reviewed With patient  -     Progress improving  -     Outcome Evaluation pt evaluated for OT today, pt admitted from [facility] where he lives alone and was independent PTA w his rollator. He was admitted w incr R LE pain and back pain. pt w. back brace when OOB. pt received epidural in his back which has releived  some pain but still has pain in his hips. He has a previous lumbar decompression. But doesn't want anymore surgeries. Upon arrival pt in BR sitting at sink. Pt was SBA w grooming skills, min A UBD, mod LBB, SBA UBB. pt was total A to don/doff brace. Pt was min A to stand and walk back to the bed w his rollator/3 wheeled walker. pt was min A for sit to sup. pt cont to benefit from OT to incr ADL, strength, balance, and tsf.  -     Row Name 08/03/22 1209          Therapy Assessment/Plan (OT)    Rehab Potential (OT) good, to achieve stated therapy goals  -     Criteria for Skilled Therapeutic Interventions Met (OT) yes;skilled treatment is necessary;meets criteria  -     Therapy Frequency (OT) 5 times/wk  -     Row Name 08/03/22 1209          Therapy Plan Review/Discharge Plan (OT)    Anticipated Discharge Disposition (OT) skilled nursing facility  -     Row Name 08/03/22 1209          Positioning and Restraints    Pre-Treatment Position bathroom  -     Post Treatment Position bed  -KP     In Bed supine;call light within reach;encouraged to call for assist;notified nsg;exit alarm on  -           User Key  (r) = Recorded By, (t) = Taken By, (c) = Cosigned By    Initials Name Provider Type     Yamilet De Paz, OTR Occupational Therapist               Outcome Measures     Row Name 08/03/22 1217          How much help from another is currently needed...    Putting on and taking off regular lower body clothing? 1  -KP     Bathing (including washing, rinsing, and drying) 2  -KP     Toileting (which includes using toilet bed pan or urinal) 3  -KP     Putting on and taking off regular upper body clothing 3  -KP     Taking care of personal grooming (such as brushing teeth) 3  -KP     Eating meals 3  -KP     AM-PAC 6 Clicks Score (OT) 15  -     Row Name 08/03/22 0902          How much help from another person do you currently need...    Turning from your back to your side while in flat bed without using  bedrails? 3  -CB     Moving from lying on back to sitting on the side of a flat bed without bedrails? 3  -CB     Moving to and from a bed to a chair (including a wheelchair)? 3  -CB     Standing up from a chair using your arms (e.g., wheelchair, bedside chair)? 2  -CB     Climbing 3-5 steps with a railing? 2  -CB     To walk in hospital room? 3  -CB     AM-PAC 6 Clicks Score (PT) 16  -CB     Highest level of mobility 5 --> Static standing  -CB     Row Name 08/03/22 1217 08/03/22 0902       Functional Assessment    Outcome Measure Options AM-PAC 6 Clicks Daily Activity (OT)  -KP AM-PAC 6 Clicks Basic Mobility (PT)  -CB          User Key  (r) = Recorded By, (t) = Taken By, (c) = Cosigned By    Initials Name Provider Type    Yamilet Mccray, OTR Occupational Therapist    Amparo Gutierrez, PT Physical Therapist                Occupational Therapy Education                 Title: PT OT SLP Therapies (Done)     Topic: Occupational Therapy (Done)     Point: ADL training (Done)     Description:   Instruct learner(s) on proper safety adaptation and remediation techniques during self care or transfers.   Instruct in proper use of assistive devices.              Learning Progress Summary           Patient Acceptance, E,TB,D, VU,DU by  at 8/3/2022 1219    Comment: ed pt on role of OT. benefit of therapy POC w. OT. ed on safety w  ADL and tsf.                   Point: Home exercise program (Done)     Description:   Instruct learner(s) on appropriate technique for monitoring, assisting and/or progressing therapeutic exercises/activities.              Learning Progress Summary           Patient Acceptance, E,TB,D, VU,DU by  at 8/3/2022 1219    Comment: ed pt on role of OT. benefit of therapy POC w. OT. ed on safety w  ADL and tsf.                   Point: Precautions (Done)     Description:   Instruct learner(s) on prescribed precautions during self-care and functional transfers.              Learning Progress  Summary           Patient Acceptance, E,TB,D, VU,DU by  at 8/3/2022 1219    Comment: ed pt on role of OT. benefit of therapy POC w. OT. ed on safety w  ADL and tsf.                   Point: Body mechanics (Done)     Description:   Instruct learner(s) on proper positioning and spine alignment during self-care, functional mobility activities and/or exercises.              Learning Progress Summary           Patient Acceptance, E,TB,D, VU,DU by  at 8/3/2022 1219    Comment: ed pt on role of OT. benefit of therapy POC w. OT. ed on safety w  ADL and tsf.                               User Key     Initials Effective Dates Name Provider Type Discipline     06/16/21 -  Yamilet De Paz, OTR Occupational Therapist OT              OT Recommendation and Plan  Planned Therapy Interventions (OT): activity tolerance training, functional balance retraining, occupation/activity based interventions, transfer/mobility retraining, strengthening exercise, ROM/therapeutic exercise, BADL retraining  Therapy Frequency (OT): 5 times/wk  Plan of Care Review  Plan of Care Reviewed With: patient  Progress: improving  Outcome Evaluation: pt evaluated for OT today, pt admitted from [facility] where he lives alone and was independent PTA w his rollator. He was admitted w incr R LE pain and back pain. pt w. back brace when OOB. pt received epidural in his back which has releived some pain but still has pain in his hips. He has a previous lumbar decompression. But doesn't want anymore surgeries. Upon arrival pt in BR sitting at sink. Pt was SBA w grooming skills, min A UBD, mod LBB, SBA UBB. pt was total A to don/doff brace. Pt was min A to stand and walk back to the bed w his rollator/3 wheeled walker. pt was min A for sit to sup. pt cont to benefit from OT to incr ADL, strength, balance, and tsf.     Time Calculation:    Time Calculation- OT     Row Name 08/03/22 1219             Time Calculation- OT    OT Start Time 0956  -      OT Stop  Time 1028  -      OT Time Calculation (min) 32 min  -      Total Timed Code Minutes- OT 23 minute(s)  -      OT Received On 08/03/22  -      OT - Next Appointment 08/04/22  -      OT Goal Re-Cert Due Date 08/17/22  -              Timed Charges    77927 - OT Self Care/Mgmt Minutes 23  -KP              Untimed Charges    OT Eval/Re-eval Minutes 9  -KP              Total Minutes    Timed Charges Total Minutes 23  -KP      Untimed Charges Total Minutes 9  -KP       Total Minutes 32  -KP            User Key  (r) = Recorded By, (t) = Taken By, (c) = Cosigned By    Initials Name Provider Type     Yamilet De Paz OTR Occupational Therapist              Therapy Charges for Today     Code Description Service Date Service Provider Modifiers Qty    40509256244 HC OT SELF CARE/MGMT/TRAIN EA 15 MIN 8/3/2022 Yamilet De Paz OTR GO 2    28160717261 HC OT EVAL MOD COMPLEXITY 3 8/3/2022 Yamilet De Paz OTR GO 1               JEREMI Tadeo  8/3/2022

## 2022-08-03 NOTE — PROGRESS NOTES
Name: Tony Leonard ADMIT: 2022   : 1940  PCP: Erich Aviles MD    MRN: [id number] LOS: 2 days   AGE/SEX: 82 y.o. male  ROOM: [facility]     Subjective   Subjective   No acute events overnight.  Really not much improvement in his right lower extremity pain.       Objective   Objective   Vital Signs  Temp:  [97.9 °F (36.6 °C)-98.2 °F (36.8 °C)] 98.2 °F (36.8 °C)  Heart Rate:  [55-84] 79  Resp:  [18-20] 18  BP: (121-154)/(66-91) 122/91  SpO2:  [92 %-95 %] 93 %  on   ;   Device (Oxygen Therapy): room air  Body mass index is 25.12 kg/m².  Physical Exam  Constitutional:       Appearance: He is ill-appearing.      Comments: Chronically ill-appearing   HENT:      Head: Normocephalic and atraumatic.   Cardiovascular:      Rate and Rhythm: Normal rate and regular rhythm.   Abdominal:      Palpations: Abdomen is soft.   Neurological:      Mental Status: He is alert and oriented to person, place, and time.      Motor: Weakness present.         Results Review     I reviewed the patient's new clinical results.  Results from last 7 days   Lab Units 22  0659 22  0727   WBC 10*3/mm3 6.50 6.94   HEMOGLOBIN g/dL 13.5 11.5*   PLATELETS 10*3/mm3 231 215     Results from last 7 days   Lab Units 22  0659 22  0727   SODIUM mmol/L 135* 139   POTASSIUM mmol/L 5.1 4.1   CHLORIDE mmol/L 103 105   CO2 mmol/L 20.0* 24.8   BUN mg/dL 21 23   CREATININE mg/dL 0.79 0.77   GLUCOSE mg/dL 127* 94   Estimated Creatinine Clearance: 80.4 mL/min (by C-G formula based on SCr of 0.79 mg/dL).  Results from last 7 days   Lab Units 22  0659   ALBUMIN g/dL 4.00   BILIRUBIN mg/dL 0.3   ALK PHOS U/L 134*   AST (SGOT) U/L 20   ALT (SGPT) U/L 13     Results from last 7 days   Lab Units 22  0659 22  0727   CALCIUM mg/dL 9.2 8.7   ALBUMIN g/dL 4.00  --    MAGNESIUM mg/dL 2.4  --        COVID19   Date Value Ref Range Status   2022 Not Detected Not Detected - Ref. Range Final   2022 Not Detected  Not Detected - Ref. Range Final     No results found for: HGBA1C, POCGLU    FL Guided Pain Management Spine  This procedure was auto-finalized with no dictation required.    Scheduled Medications  budesonide, 0.5 mg, Nebulization, BID  FLUoxetine, 20 mg, Oral, Daily  furosemide, 40 mg, Oral, Daily  gabapentin, 300 mg, Oral, TID  ipratropium-albuterol, 3 mL, Nebulization, Q8H - RT  methylphenidate, 10 mg, Oral, Daily  Mirabegron ER, 50 mg, Oral, Daily  multivitamin with minerals, 1 tablet, Oral, Daily  pantoprazole, 40 mg, Oral, QAM  polyethylene glycol, 17 g, Oral, Daily  potassium chloride, 20 mEq, Oral, Daily  rOPINIRole, 0.5 mg, Oral, Nightly  vitamin B-12, 500 mcg, Oral, Daily    Infusions   Diet  Diet Regular; Cardiac       Assessment/Plan     Active Hospital Problems    Diagnosis  POA   • **Spinal stenosis, lumbar region with neurogenic claudication [M48.062]  Yes   • Compression deformity of vertebra [M43.9]  Unknown   • Chronic diastolic CHF (congestive heart failure) ([facility]) [I50.32]  Yes   • COPD (chronic obstructive pulmonary disease) ([facility]) [J44.9]  Yes   • Benign non-nodular prostatic hyperplasia without lower urinary tract symptoms [N40.0]  Yes      Resolved Hospital Problems   No resolved problems to display.     82-year-old man who came to the hospital with worsening back pain.  MRI showed chronic compression of L1, acute compression deformity of L3 with severe lumbar spondylosis.  Canal stenosis was noted to be worse at L4-L5 and orthospine was consulted    Spinal stenosis/compression deformity  Status post LESI with pain management  LSO brace ordered.  Continue pain control and Flexeril for spasms    Diastolic heart failure  Continue home Lasix    COPD  Home nebulizers    · SCDs for DVT prophylaxis.  · Full code.  · Discussed with patient.        Alberto Tomlinson MD  [facility]  08/03/22  14:41 EDT     I wore protective equipment throughout this patient encounter including a face  mask, gloves and protective eyewear.  Hand hygiene was performed before donning protective equipment and after removal when leaving the room.

## 2022-08-03 NOTE — CASE MANAGEMENT/SOCIAL WORK
Discharge Planning Assessment  [facility]     Patient Name: Tony Leonard  MRN: [id number]  Today's Date: 8/3/2022    Admit Date: 8/1/2022     Discharge Needs Assessment     Row Name 08/03/22 1324       Living Environment    People in Home alone    Current Living Arrangements home    Primary Care Provided by self    Provides Primary Care For no one    Family Caregiver if Needed child(brian), adult    Able to Return to Prior Arrangements yes       Resource/Environmental Concerns    Resource/Environmental Concerns none       Transition Planning    Patient/Family Anticipates Transition to inpatient rehabilitation facility    Patient/Family Anticipated Services at Transition skilled nursing;rehabilitation services    Transportation Anticipated health plan transportation;family or friend will provide       Discharge Needs Assessment    Equipment Currently Used at Home rollator;nebulizer;respiratory supplies    Concerns to be Addressed discharge planning    Discharge Facility/Level of Care Needs nursing facility, skilled               Discharge Plan     Row Name 08/03/22 1325       Plan    Plan SNF referrals pending    Patient/Family in Agreement with Plan yes    Plan Comments CSW spoke to patient at bedside; explained role, verified facesheet, and discussed dc plan. Plan is SNF (referrals pending). Transportation needs pending. Patient uses a nebulizer, oxygen machine, and a rollator at home. He lives alone in a 1 floor apartment. He lives on the 3rd floor and there is an elevator. [facility] has used [facility] in the past. Patient reports he has never been to an inpatient SNF. PT is recommending SNF at [place]. Patient is agreeable to SNF and is requesting a referral to [facility] and additional back-up facilities around the [facility]. CCP to follow pending SNF referrals and assess transportation needs closer to d/c. JUSTYN, CSW              Continued Care and Services - Admitted Since 8/1/2022    Coordination has not been  started for this encounter.       Expected Discharge Date and Time     Expected Discharge Date Expected Discharge Time    Aug 3, 2022          Demographic Summary     Row Name 08/03/22 1324       General Information    Admission Type inpatient    Arrived From home    Referral Source admission list    Reason for Consult discharge planning    Preferred Language English       Contact Information    Permission Granted to Share Info With family/designee               Functional Status     Row Name 08/03/22 1324       Functional Status    Usual Activity Tolerance moderate    Current Activity Tolerance fair       Functional Status, IADL    Medications assistive equipment    Meal Preparation assistive equipment    Housekeeping assistive equipment    Laundry assistive equipment    Shopping assistive equipment       Mental Status    General Appearance WDL WDL               Psychosocial    No documentation.                Abuse/Neglect    No documentation.                Legal    No documentation.                Substance Abuse    No documentation.                Patient Forms    No documentation.                   RUTHIE BRISCOE

## 2022-08-04 ENCOUNTER — APPOINTMENT (OUTPATIENT)
Dept: GENERAL RADIOLOGY | Facility: HOSPITAL | Age: 82
End: 2022-08-04

## 2022-08-04 LAB
ANION GAP SERPL CALCULATED.3IONS-SCNC: 15.9 MMOL/L (ref 5–15)
BUN SERPL-MCNC: 29 MG/DL (ref 8–23)
BUN/CREAT SERPL: 27.6 (ref 7–25)
CALCIUM SPEC-SCNC: 9.2 MG/DL (ref 8.6–10.5)
CHLORIDE SERPL-SCNC: 100 MMOL/L (ref 98–107)
CO2 SERPL-SCNC: 24.1 MMOL/L (ref 22–29)
CREAT SERPL-MCNC: 1.05 MG/DL (ref 0.76–1.27)
DEPRECATED RDW RBC AUTO: 43.4 FL (ref 37–54)
EGFRCR SERPLBLD CKD-EPI 2021: 70 ML/MIN/1.73
ERYTHROCYTE [DISTWIDTH] IN BLOOD BY AUTOMATED COUNT: 12.7 % (ref 12.3–15.4)
GLUCOSE SERPL-MCNC: 89 MG/DL (ref 65–99)
HCT VFR BLD AUTO: 39.1 % (ref 37.5–51)
HGB BLD-MCNC: 13.6 G/DL (ref 13–17.7)
MCH RBC QN AUTO: 32.5 PG (ref 26.6–33)
MCHC RBC AUTO-ENTMCNC: 34.8 G/DL (ref 31.5–35.7)
MCV RBC AUTO: 93.3 FL (ref 79–97)
PLATELET # BLD AUTO: 283 10*3/MM3 (ref 140–450)
PMV BLD AUTO: 9.6 FL (ref 6–12)
POTASSIUM SERPL-SCNC: 4.5 MMOL/L (ref 3.5–5.2)
RBC # BLD AUTO: 4.19 10*6/MM3 (ref 4.14–5.8)
SODIUM SERPL-SCNC: 140 MMOL/L (ref 136–145)
WBC NRBC COR # BLD: 10.61 10*3/MM3 (ref 3.4–10.8)

## 2022-08-04 PROCEDURE — 72100 X-RAY EXAM L-S SPINE 2/3 VWS: CPT

## 2022-08-04 PROCEDURE — 92611 MOTION FLUOROSCOPY/SWALLOW: CPT

## 2022-08-04 PROCEDURE — 94799 UNLISTED PULMONARY SVC/PX: CPT

## 2022-08-04 PROCEDURE — 73502 X-RAY EXAM HIP UNI 2-3 VIEWS: CPT

## 2022-08-04 PROCEDURE — 97116 GAIT TRAINING THERAPY: CPT

## 2022-08-04 PROCEDURE — 80048 BASIC METABOLIC PNL TOTAL CA: CPT | Performed by: STUDENT IN AN ORGANIZED HEALTH CARE EDUCATION/TRAINING PROGRAM

## 2022-08-04 PROCEDURE — 85027 COMPLETE CBC AUTOMATED: CPT | Performed by: STUDENT IN AN ORGANIZED HEALTH CARE EDUCATION/TRAINING PROGRAM

## 2022-08-04 PROCEDURE — 97530 THERAPEUTIC ACTIVITIES: CPT

## 2022-08-04 PROCEDURE — 74230 X-RAY XM SWLNG FUNCJ C+: CPT

## 2022-08-04 RX ADMIN — ROPINIROLE HYDROCHLORIDE 0.5 MG: 0.5 TABLET, FILM COATED ORAL at 22:02

## 2022-08-04 RX ADMIN — MULTIPLE VITAMINS W/ MINERALS TAB 1 TABLET: TAB at 09:24

## 2022-08-04 RX ADMIN — HYDROCODONE BITARTRATE AND ACETAMINOPHEN 1 TABLET: 7.5; 325 TABLET ORAL at 15:17

## 2022-08-04 RX ADMIN — PANTOPRAZOLE SODIUM 40 MG: 40 TABLET, DELAYED RELEASE ORAL at 06:38

## 2022-08-04 RX ADMIN — Medication 10 ML: at 22:02

## 2022-08-04 RX ADMIN — IPRATROPIUM BROMIDE AND ALBUTEROL SULFATE 3 ML: .5; 3 SOLUTION RESPIRATORY (INHALATION) at 23:24

## 2022-08-04 RX ADMIN — IPRATROPIUM BROMIDE AND ALBUTEROL SULFATE 3 ML: .5; 3 SOLUTION RESPIRATORY (INHALATION) at 15:19

## 2022-08-04 RX ADMIN — POTASSIUM CHLORIDE 20 MEQ: 10 TABLET, EXTENDED RELEASE ORAL at 09:24

## 2022-08-04 RX ADMIN — GABAPENTIN 300 MG: 300 CAPSULE ORAL at 22:02

## 2022-08-04 RX ADMIN — BARIUM SULFATE 1 TEASPOON(S): 0.6 CREAM ORAL at 11:15

## 2022-08-04 RX ADMIN — BARIUM SULFATE 55 ML: 0.81 POWDER, FOR SUSPENSION ORAL at 11:15

## 2022-08-04 RX ADMIN — Medication 500 MCG: at 09:24

## 2022-08-04 RX ADMIN — IPRATROPIUM BROMIDE AND ALBUTEROL SULFATE 3 ML: .5; 3 SOLUTION RESPIRATORY (INHALATION) at 08:15

## 2022-08-04 RX ADMIN — POLYETHYLENE GLYCOL 3350 17 G: 17 POWDER, FOR SOLUTION ORAL at 09:24

## 2022-08-04 RX ADMIN — BARIUM SULFATE 50 ML: 400 SUSPENSION ORAL at 11:15

## 2022-08-04 RX ADMIN — HYDROCODONE BITARTRATE AND ACETAMINOPHEN 1 TABLET: 7.5; 325 TABLET ORAL at 22:02

## 2022-08-04 RX ADMIN — BUDESONIDE 0.5 MG: 0.5 INHALANT ORAL at 21:08

## 2022-08-04 RX ADMIN — GABAPENTIN 300 MG: 300 CAPSULE ORAL at 15:17

## 2022-08-04 RX ADMIN — FLUOXETINE HYDROCHLORIDE 20 MG: 20 CAPSULE ORAL at 09:24

## 2022-08-04 RX ADMIN — METHYLPHENIDATE HYDROCHLORIDE 10 MG: 10 TABLET ORAL at 09:24

## 2022-08-04 RX ADMIN — BARIUM SULFATE 4 ML: 980 POWDER, FOR SUSPENSION ORAL at 11:15

## 2022-08-04 RX ADMIN — FUROSEMIDE 40 MG: 40 TABLET ORAL at 09:24

## 2022-08-04 RX ADMIN — GABAPENTIN 300 MG: 300 CAPSULE ORAL at 09:24

## 2022-08-04 RX ADMIN — MIRABEGRON 50 MG: 25 TABLET, FILM COATED, EXTENDED RELEASE ORAL at 09:24

## 2022-08-04 RX ADMIN — CYCLOBENZAPRINE 10 MG: 10 TABLET, FILM COATED ORAL at 15:17

## 2022-08-04 RX ADMIN — BUDESONIDE 0.5 MG: 0.5 INHALANT ORAL at 08:16

## 2022-08-04 RX ADMIN — HYDROCODONE BITARTRATE AND ACETAMINOPHEN 1 TABLET: 7.5; 325 TABLET ORAL at 06:41

## 2022-08-04 NOTE — PLAN OF CARE
Goal Outcome Evaluation:  Plan of Care Reviewed With: patient        Progress: improving  Outcome Evaluation: Patient seen for PT this PM. He required variable assist with STS today. He was CGA for STS from toilet using grab bar but min-modAx2 for STS from EOB. Cues for proper UE placement and safety. He ambulated 45ft using rwx requiring CGAx2 and max cues for upright posture as he is very forward flexed during ambulation. He reports R hip pain 10/10 and RN notified. Will continue to progress pt as he tolerates.

## 2022-08-04 NOTE — PLAN OF CARE
Goal Outcome Evaluation:           Progress: improving  Outcome Evaluation: Pt agreeable to OT this afternoon and moves supine-sit with cues for log roll technique and min. A. Pt completes BUE exercises sitting EOB then performs 2 STS with mod. A. Pt able to ambulate 25 ft in room then goes to bathroom for BM. PT requiring min. A and cues for performing adequate perineal hygiene. Pt left with PT to stand and work with them. Pt this date improving but still would benefit from SNF at d/c to improve to independent PLOF.    OT wore appropriate PPE and completed hand hygiene.

## 2022-08-04 NOTE — CASE MANAGEMENT/SOCIAL WORK
Continued Stay Note  [facility]     Patient Name: Tony Leonard  MRN: [id number]  Today's Date: 8/4/2022    Admit Date: 8/1/2022     Discharge Plan     Row Name 08/04/22 1551       Plan    Plan [facility]- bed available Saturday, 8/6    Patient/Family in Agreement with Plan yes    Plan Comments CCP spoke with Gladys/Rambo who stated that [facility] can accept the patient and has a bed available Saturday, 8/6. CCP updated patient at the bedside who is agreeable. Gladys inquired about patient receiving updated COVID booster prior to discharge and patient is agreeable. CCP requested pharmacy order from MD via [facility] liaison. CCP to follow. Packet in CCP office. Serjio DC RN CCP               Discharge Codes    No documentation.               Expected Discharge Date and Time     Expected Discharge Date Expected Discharge Time    Aug 5, 2022             Serjio Painting RN

## 2022-08-04 NOTE — PLAN OF CARE
Goal Outcome Evaluation:  Plan of Care Reviewed With: patient           Outcome Evaluation: VFSS completed. One episode of trace aspiration with thins with delayed cough response. No penetration honey or puree. Transient penetration with nectar. Moderate residue at the valleculae with regular solids, which patient cleared to mild with a liquid wash. SLP recs regular and thins. Single drinks. Multiple swallows. Alternate liquids and solids. Assist with meals. Meds as kathryn. Rec dysphagia tx at next level of care.       Patient was not wearing a face mask during this therapy encounter. Therapist used appropriate personal protective equipment including mask, eye protection and gloves.  Mask used was standard procedure mask. Appropriate PPE was worn during the entire therapy session. Hand hygiene was completed before and after therapy session. Patient is not in enhanced droplet precautions.

## 2022-08-04 NOTE — THERAPY TREATMENT NOTE
Patient Name: Tony Leonard  : 1940    MRN: [id number]                              Today's Date: 2022       Admit Date: 2022    Visit Dx:     ICD-10-CM ICD-9-CM   1. Acute midline low back pain with right-sided sciatica  M54.41 724.2     724.3   2. Compression fracture of L3 lumbar vertebra, closed, initial encounter ([facility])  S32.030A 805.4   3. Lumbar disc herniation  M51.26 722.10   4. Spinal stenosis, lumbar region with neurogenic claudication  M48.062 724.03     Patient Active Problem List   Diagnosis   • Thrush, oral   • ADD (attention deficit disorder)   • Hemorrhoids   • Bronchiectasis with acute lower respiratory infection ([facility])   • Diverticul disease small and large intestine, no perforati or abscess   • Benign non-nodular prostatic hyperplasia without lower urinary tract symptoms   • Gastroesophageal reflux disease   • Malaise and fatigue   • Environmental allergies   • Hemoptysis   • Dyslipidemia   • Primary osteoarthritis involving multiple joints   • Pneumonia of right lower lobe due to infectious organism   • Abnormal EKG   • COPD (chronic obstructive pulmonary disease) ([facility])   • Mild malnutrition ([facility])   • Acute on chronic respiratory failure with hypoxia ([facility])   • Fracture, intertrochanteric, right femur, closed, initial encounter ([facility])   • Chronic diastolic CHF (congestive heart failure) ([facility])   • Hematoma of frontal scalp   • Postoperative anemia due to acute blood loss   • Urinary retention   • Hemorrhagic disorder due to circulating anticoagulants ([facility])   • History of fracture of right hip   • Closed fracture of right hip with routine healing   • Chronic low back pain with sciatica   • Change in bowel function   • Rectal bleeding   • Prostate cancer ([facility])   • On home oxygen therapy   • Acute viral bronchitis   • Chronic diastolic (congestive) heart failure ([facility])   • Peripheral neuropathy   • Plantar fasciitis   • COPD exacerbation ([facility])   • Bilateral lower extremity edema   •  Microscopic hematuria   • Acute midline low back pain with right-sided sciatica   • Spinal stenosis, lumbar region with neurogenic claudication   • Compression deformity of vertebra     Past Medical History:   Diagnosis Date   • ADHD (attention deficit hyperactivity disorder)    • Bronchiectasis ([facility])    • Colon polyp    • COPD (chronic obstructive pulmonary disease) ([facility])    • Diverticulosis    • Hard of hearing    • On home oxygen therapy     2 LITERS   • Pneumonia    • Prostate cancer (HCC) 04/22/2019   • Spinal stenosis, lumbar region with neurogenic claudication 08/02/2022     Past Surgical History:   Procedure Laterality Date   • BRONCHOSCOPY N/A 4/30/2016    Procedure: BRONCHOSCOPY with BAL of right lower lobe and left  lower lobe.  ;  Surgeon: Nando Diaz MD;  Location: [facility] ENDOSCOPY;  Service:    • BRONCHOSCOPY N/A 4/28/2017    Procedure: BRONCHOSCOPY;  Surgeon: Katharina Salter MD;  Location: [facility] ENDOSCOPY;  Service:    • BRONCHOSCOPY Bilateral 6/29/2017    Procedure: BRONCHOSCOPY WITH WASHINGS;  Surgeon: Katharina Salter MD;  Location: [facility] ENDOSCOPY;  Service:    • BRONCHOSCOPY N/A 1/22/2018    Procedure: BRONCHOSCOPY AT BEDSIDE with BAL;  Surgeon: Katharina Salter MD;  Location: [facility] ENDOSCOPY;  Service:    • BRONCHOSCOPY N/A 1/30/2018    Procedure: BRONCHOSCOPY with BAL and washing;  Surgeon: Shreyas Wild MD;  Location: [facility] ENDOSCOPY;  Service:    • BRONCHOSCOPY Bilateral 4/24/2018    Procedure: BRONCHOSCOPY WITH WASHING;  Surgeon: Katharina Salter MD;  Location: [facility] ENDOSCOPY;  Service: Pulmonary   • BRONCHOSCOPY N/A 12/28/2019    Procedure: BRONCHOSCOPY with bilateral washing;  Surgeon: Katharina Salter MD;  Location: [facility] ENDOSCOPY;  Service: Pulmonary   • COLONOSCOPY  05/17/2013    eh, ih, tort, sig tics   • COLONOSCOPY N/A 5/10/2019    Non-thrombosed external hemorrhoids found on perianal exam, Diverticulosis, Tortuous colon, One 5 mm polyp in the mid ascending colon, IH. Path:  Tubular adenoma.    • ENDOSCOPY  03/19/2015    z line irreg, hh   • HIP OPEN REDUCTION Right 1/17/2018    Procedure: HIP OPEN REDUCTION INTERNAL FIXATION WITH DYNAMIC HIP SCREW;  Surgeon: Les Black MD;  Location: [facility] MAIN OR;  Service:    • SPINE SURGERY     • TONSILLECTOMY     • TOTAL HIP ARTHROPLASTY REVISION Right 9/23/2019    Procedure: HIP  REVISION RIGHT;  Surgeon: Isauro Warner MD;  Location: [facility] OR;  Service: Orthopedics      General Information     Row Name 08/04/22 1554          OT Time and Intention    Document Type therapy note (daily note)  -KP (r) SA (t) KP (c)     Mode of Treatment individual therapy;occupational therapy  -KP (r) SA (t) KP (c)     Row Name 08/04/22 1554          General Information    Patient Profile Reviewed yes  -KP (r) SA (t) KP (c)     Prior Level of Function independent:  -KP (r) SA (t) KP (c)     Existing Precautions/Restrictions fall;spinal;brace worn when out of bed;LSO  -KP (r) SA (t) KP (c)     Row Name 08/04/22 1554          Cognition    Orientation Status (Cognition) oriented x 3  -KP (r) SA (t) KP (c)     Row Name 08/04/22 1554          Safety Issues, Functional Mobility    Safety Issues Affecting Function (Mobility) awareness of need for assistance;insight into deficits/self-awareness;positioning of assistive device;problem-solving;safety precaution awareness;safety precautions follow-through/compliance  -KP (r) SA (t) KP (c)     Impairments Affecting Function (Mobility) balance;cognition;endurance/activity tolerance;strength;pain  -KP (r) SA (t) KP (c)     Cognitive Impairments, Mobility Safety/Performance awareness, need for assistance;insight into deficits/self-awareness;safety precaution awareness;safety precaution follow-through;problem-solving/reasoning  -KP (r) SA (t) KP (c)           User Key  (r) = Recorded By, (t) = Taken By, (c) = Cosigned By    Initials Name Provider Type    Yamilet Mccray, OTR Occupational Therapist    SA  Qureshi, Kirsten, OT Student OT Student                 Mobility/ADL's     Row Name 08/04/22 1554          Bed Mobility    Bed Mobility supine-sit  -KP (r) SA (t) KP (c)     Supine-Sit [place] (Bed Mobility) minimum assist (75% patient effort);verbal cues;nonverbal cues (demo/gesture)  -KP (r) SA (t) KP (c)     Comment, (Bed Mobility) educate don log roll technique  -KP (r) SA (t) KP (c)     Row Name 08/04/22 1554          Transfers    Transfers sit-stand transfer;stand-sit transfer;toilet transfer  -KP (r) SA (t) KP (c)     Sit-Stand [place] (Transfers) verbal cues;nonverbal cues (demo/gesture);moderate assist (50% patient effort)  -KP (r) SA (t) KP (c)     Stand-Sit [place] (Transfers) verbal cues;nonverbal cues (demo/gesture);minimum assist (75% patient effort)  -KP (r) SA (t) KP (c)     [place] Level (Toilet Transfer) verbal cues;nonverbal cues (demo/gesture);minimum assist (75% patient effort)  -KP (r) SA (t) KP (c)     Row Name 08/04/22 1554          Sit-Stand Transfer    Assistive Device (Sit-Stand Transfers) walker, 4-wheeled  -KP (r) SA (t) KP (c)     Comment, (Sit-Stand Transfer) x2, cues needed for safe hand placement  -KP (r) SA (t) KP (c)     Row Name 08/04/22 1554          Stand-Sit Transfer    Assistive Device (Stand-Sit Transfers) walker, 4-wheeled  -KP (r) SA (t) KP (c)     Row Name 08/04/22 1554          Toilet Transfer    Type (Toilet Transfer) sit-stand  -KP (r) SA (t) KP (c)     Comment, (Toilet Transfer) pt left with PT while using bathroom  -KP (r) SA (t) KP (c)     Row Name 08/04/22 1554          Functional Mobility    Functional Mobility- Ind. Level minimum assist (75% patient effort)  -KP (r) SA (t) KP (c)     Functional Mobility- Device walker, 4-wheeled  -KP (r) SA (t) KP (c)     Functional Mobility-Distance (Feet) 20  -KP (r) SA (t) KP (c)     Functional Mobility- Comment ambulating around room to bathroom  -KP (r) SA (t) KP (c)     Row Name 08/04/22 1555           Activities of Daily Living    BADL Assessment/Intervention toileting  -KP (r) SA (t) KP (c)     Row Name 08/04/22 1554          Toileting Assessment/Training    [place] Level (Toileting) adjust/manage clothing;bridging;perform perineal hygiene;minimum assist (75% patient effort)  -KP (r) SA (t) KP (c)     Position (Toileting) supported sitting  -KP (r) SA (t) KP (c)           User Key  (r) = Recorded By, (t) = Taken By, (c) = Cosigned By    Initials Name Provider Type    Yamilet Mccray, OTR Occupational Therapist    Kirsten Lopez, OT Student OT Student               Obj/Interventions     Row Name 08/04/22 1556          Shoulder (Therapeutic Exercise)    Shoulder (Therapeutic Exercise) AROM (active range of motion)  -KP (r) SA (t) KP (c)     Shoulder AROM (Therapeutic Exercise) bilateral;flexion;extension;aBduction;aDduction;10 repetitions;2 sets  -KP (r) SA (t) KP (c)     Row Name 08/04/22 1556          Elbow/Forearm (Therapeutic Exercise)    Elbow/Forearm (Therapeutic Exercise) AROM (active range of motion)  -KP (r) SA (t) KP (c)     Elbow/Forearm AROM (Therapeutic Exercise) bilateral;flexion;extension;10 repetitions;supination;pronation  -KP (r) SA (t) KP (c)     Row Name 08/04/22 1556          Hand (Therapeutic Exercise)    Hand (Therapeutic Exercise) AROM (active range of motion)  -KP (r) SA (t) KP (c)     Hand AROM/AAROM (Therapeutic Exercise) bilateral;AROM (active range of motion);finger flexion;finger extension;10 repetitions  -KP (r) SA (t) KP (c)     Row Name 08/04/22 1556          Motor Skills    Motor Skills functional endurance  -KP (r) SA (t) KP (c)     Functional Endurance fair  -KP (r) SA (t) KP (c)     Therapeutic Exercise shoulder;elbow/forearm;hand  -KP (r) SA (t) KP (c)     Row Name 08/04/22 1556          Balance    Balance Assessment sitting static balance;sitting dynamic balance;standing static balance;standing dynamic balance  -KP (r) SA (t) KP (c)     Static Sitting  Balance standby assist  -KP (r) SA (t) KP (c)     Dynamic Sitting Balance standby assist;contact guard  -KP (r) SA (t) KP (c)     Position, Sitting Balance sitting edge of bed  -KP (r) SA (t) KP (c)     Static Standing Balance contact guard  -KP (r) SA (t) KP (c)     Dynamic Standing Balance contact guard;minimal assist  -KP (r) SA (t) KP (c)     Position/Device Used, Standing Balance supported;walker, front-wheeled  -KP (r) SA (t) KP (c)           User Key  (r) = Recorded By, (t) = Taken By, (c) = Cosigned By    Initials Name Provider Type    KP Yamilet De Paz, OTR Occupational Therapist    Kirsten Lopez OT Student OT Student               Goals/Plan    No documentation.                Clinical Impression     Row Name 08/04/22 1557          Pain Assessment    Pretreatment Pain Rating 5/10  -KP (r) SA (t) KP (c)     Posttreatment Pain Rating 5/10  -KP (r) SA (t) KP (c)     Pain Location - Side/Orientation Right  -KP (r) SA (t) KP (c)     Pain Location lower  -KP (r) SA (t) KP (c)     Pain Location - hip  -KP (r) SA (t) KP (c)     Row Name 08/04/22 9777          Plan of Care Review    Progress improving  -KP (r) SA (t) KP (c)     Outcome Evaluation Pt agreeable to OT this afternoon and moves supine-sit with cues for log roll technique and min. A. Pt completes BUE exercises sitting EOB then performs 2 STS with mod. A. Pt able to ambulate 25 ft in room then goes to bathroom for BM. PT requiring min. A and cues for performing adequate perineal hygiene. Pt left with PT to stand and work with them. Pt this date improving but still would benefit from SNF at d/c to improve to independent PLOF.  -KP (r) SA (t) KP (c)     Row Name 08/04/22 1557          Therapy Plan Review/Discharge Plan (OT)    Anticipated Discharge Disposition (OT) skilled nursing facility  -KP (r) SA (t) KP (c)     Row Name 08/04/22 1555          Positioning and Restraints    Pre-Treatment Position in bed  -KP (r) SA (t) KP (c)     Post  Treatment Position bathroom  -KP (r) SA (t) KP (c)     Bathroom sitting;with PT  -KP (r) SA (t) KP (c)           User Key  (r) = Recorded By, (t) = Taken By, (c) = Cosigned By    Initials Name Provider Type    Yamilet Mccray, OTR Occupational Therapist    Kirsten Lopez, OT Student OT Student               Outcome Measures     Row Name 08/04/22 1600          How much help from another is currently needed...    Putting on and taking off regular lower body clothing? 2  -KP (r) SA (t) KP (c)     Bathing (including washing, rinsing, and drying) 2  -KP (r) SA (t) KP (c)     Toileting (which includes using toilet bed pan or urinal) 3  -KP (r) SA (t) KP (c)     Putting on and taking off regular upper body clothing 3  -KP (r) SA (t) KP (c)     Taking care of personal grooming (such as brushing teeth) 3  -KP (r) SA (t) KP (c)     Eating meals 3  -KP (r) SA (t) KP (c)     AM-PAC 6 Clicks Score (OT) 16  -KP (r) SA (t)     Row Name 08/04/22 1540          How much help from another person do you currently need...    Turning from your back to your side while in flat bed without using bedrails? 3  -CB     Moving from lying on back to sitting on the side of a flat bed without bedrails? 3  -CB     Moving to and from a bed to a chair (including a wheelchair)? 3  -CB     Standing up from a chair using your arms (e.g., wheelchair, bedside chair)? 2  -CB     Climbing 3-5 steps with a railing? 2  -CB     To walk in hospital room? 3  -CB     AM-PAC 6 Clicks Score (PT) 16  -CB     Highest level of mobility 5 --> Static standing  -CB     Row Name 08/04/22 1600 08/04/22 1540       Functional Assessment    Outcome Measure Options AM-PAC 6 Clicks Daily Activity (OT)  -KP (r) SA (t) KP (c) AM-PAC 6 Clicks Basic Mobility (PT)  -CB          User Key  (r) = Recorded By, (t) = Taken By, (c) = Cosigned By    Initials Name Provider Type    Yamilet Mccray, OTR Occupational Therapist    Amparo Gutierrez, PT Physical Therapist     Kirsten Lopez, OT Student OT Student                Occupational Therapy Education                 Title: PT OT SLP Therapies (Done)     Topic: Occupational Therapy (Done)     Point: ADL training (Done)     Description:   Instruct learner(s) on proper safety adaptation and remediation techniques during self care or transfers.   Instruct in proper use of assistive devices.              Learning Progress Summary           Patient Acceptance, E,TB,D, VU,DU by  at 8/3/2022 1219    Comment: ed pt on role of OT. benefit of therapy POC w. OT. ed on safety w  ADL and tsf.                   Point: Home exercise program (Done)     Description:   Instruct learner(s) on appropriate technique for monitoring, assisting and/or progressing therapeutic exercises/activities.              Learning Progress Summary           Patient Acceptance, E,TB,D, VU,DU by  at 8/3/2022 1219    Comment: ed pt on role of OT. benefit of therapy POC w. OT. ed on safety w  ADL and tsf.                   Point: Precautions (Done)     Description:   Instruct learner(s) on prescribed precautions during self-care and functional transfers.              Learning Progress Summary           Patient Acceptance, E,TB,D, VU,DU by  at 8/3/2022 1219    Comment: ed pt on role of OT. benefit of therapy POC w. OT. ed on safety w  ADL and tsf.                   Point: Body mechanics (Done)     Description:   Instruct learner(s) on proper positioning and spine alignment during self-care, functional mobility activities and/or exercises.              Learning Progress Summary           Patient Acceptance, E,TB,D, VU,DU by  at 8/3/2022 1219    Comment: ed pt on role of OT. benefit of therapy POC w. OT. ed on safety w  ADL and tsf.                               User Key     Initials Effective Dates Name Provider Type Discipline     06/16/21 -  Yamilet De Paz, OTR Occupational Therapist OT              OT Recommendation and Plan     Plan of Care  Review  Progress: improving  Outcome Evaluation: Pt agreeable to OT this afternoon and moves supine-sit with cues for log roll technique and min. A. Pt completes BUE exercises sitting EOB then performs 2 STS with mod. A. Pt able to ambulate 25 ft in room then goes to bathroom for BM. PT requiring min. A and cues for performing adequate perineal hygiene. Pt left with PT to stand and work with them. Pt this date improving but still would benefit from SNF at d/c to improve to independent PLOF.     Time Calculation:    Time Calculation- OT     Row Name 08/04/22 1600 08/04/22 1541          Time Calculation- OT    OT Start Time 1445  -KP (r) SA (t) KP (c) --     OT Stop Time 1503  -KP (r) SA (t) KP (c) --     OT Time Calculation (min) 18 min  -KP (r) SA (t) --     Total Timed Code Minutes- OT 18 minute(s)  -KP (r) SA (t) KP (c) --     OT Received On 08/04/22  -KP (r) SA (t) KP (c) --     OT - Next Appointment 08/05/22  -KP (r) SA (t) KP (c) --            Timed Charges    12288 - Gait Training Minutes  -- 10  -CB     74773 - OT Therapeutic Activity Minutes 18  -KP (r) SA (t) KP (c) --            Total Minutes    Timed Charges Total Minutes 18  -KP (r) SA (t) 10  -CB      Total Minutes 18  -KP (r) SA (t) 10  -CB           User Key  (r) = Recorded By, (t) = Taken By, (c) = Cosigned By    Initials Name Provider Type    Yamilet Mccray, OTR Occupational Therapist    Amparo Gutierrez, PT Physical Therapist    Kirsten Lopez, OT Student OT Student              Therapy Charges for Today     Code Description Service Date Service Provider Modifiers Qty    [id number] HC OT THERAPEUTIC ACT EA 15 MIN 8/4/2022 Kirsten Qureshi OT Student GO 1               Kirsten Qureshi OT Student  8/4/2022

## 2022-08-04 NOTE — MBS/VFSS/FEES
Acute Care - Speech Language Pathology   Swallow Initial Evaluation [facility]     Patient Name: Tony Leonard  : 1940  MRN: [id number]  Today's Date: 2022               Admit Date: 2022    Visit Dx:     ICD-10-CM ICD-9-CM   1. Acute midline low back pain with right-sided sciatica  M54.41 724.2     724.3   2. Compression fracture of L3 lumbar vertebra, closed, initial encounter (Formerly [facility])  S32.030A 805.4   3. Lumbar disc herniation  M51.26 722.10   4. Spinal stenosis, lumbar region with neurogenic claudication  M48.062 724.03     Patient Active Problem List   Diagnosis   • Thrush, oral   • ADD (attention deficit disorder)   • Hemorrhoids   • Bronchiectasis with acute lower respiratory infection (Formerly [facility])   • Diverticul disease small and large intestine, no perforati or abscess   • Benign non-nodular prostatic hyperplasia without lower urinary tract symptoms   • Gastroesophageal reflux disease   • Malaise and fatigue   • Environmental allergies   • Hemoptysis   • Dyslipidemia   • Primary osteoarthritis involving multiple joints   • Pneumonia of right lower lobe due to infectious organism   • Abnormal EKG   • COPD (chronic obstructive pulmonary disease) ([facility]ly [facility])   • Mild malnutrition ([facility]ly [facility])   • Acute on chronic respiratory failure with hypoxia ([facility]ly [facility])   • Fracture, intertrochanteric, right femur, closed, initial encounter ([facility]ly [facility])   • Chronic diastolic CHF (congestive heart failure) ([facility]ly [facility])   • Hematoma of frontal scalp   • Postoperative anemia due to acute blood loss   • Urinary retention   • Hemorrhagic disorder due to circulating anticoagulants ([facility]ly [facility])   • History of fracture of right hip   • Closed fracture of right hip with routine healing   • Chronic low back pain with sciatica   • Change in bowel function   • Rectal bleeding   • Prostate cancer ([facility]ly [facility])   • On home oxygen therapy   • Acute viral bronchitis   • Chronic diastolic (congestive) heart failure ([facility])   • Peripheral neuropathy   • Plantar fasciitis   •  COPD exacerbation (HCC)   • Bilateral lower extremity edema   • Microscopic hematuria   • Acute midline low back pain with right-sided sciatica   • Spinal stenosis, lumbar region with neurogenic claudication   • Compression deformity of vertebra     Past Medical History:   Diagnosis Date   • ADHD (attention deficit hyperactivity disorder)    • Bronchiectasis (HCC)    • Colon polyp    • COPD (chronic obstructive pulmonary disease) (HCC)    • Diverticulosis    • Hard of hearing    • On home oxygen therapy     2 LITERS   • Pneumonia    • Prostate cancer (HCC) 04/22/2019   • Spinal stenosis, lumbar region with neurogenic claudication 08/02/2022     Past Surgical History:   Procedure Laterality Date   • BRONCHOSCOPY N/A 4/30/2016    Procedure: BRONCHOSCOPY with BAL of right lower lobe and left  lower lobe.  ;  Surgeon: Nando Diaz MD;  Location: [facility] ENDOSCOPY;  Service:    • BRONCHOSCOPY N/A 4/28/2017    Procedure: BRONCHOSCOPY;  Surgeon: Katharina Salter MD;  Location: [facility] ENDOSCOPY;  Service:    • BRONCHOSCOPY Bilateral 6/29/2017    Procedure: BRONCHOSCOPY WITH WASHINGS;  Surgeon: Katharina Salter MD;  Location: [facility] ENDOSCOPY;  Service:    • BRONCHOSCOPY N/A 1/22/2018    Procedure: BRONCHOSCOPY AT BEDSIDE with BAL;  Surgeon: Katharina Salter MD;  Location: [facility] ENDOSCOPY;  Service:    • BRONCHOSCOPY N/A 1/30/2018    Procedure: BRONCHOSCOPY with BAL and washing;  Surgeon: Shreyas Wild MD;  Location: [facility] ENDOSCOPY;  Service:    • BRONCHOSCOPY Bilateral 4/24/2018    Procedure: BRONCHOSCOPY WITH WASHING;  Surgeon: Katharina Salter MD;  Location: [facility] ENDOSCOPY;  Service: Pulmonary   • BRONCHOSCOPY N/A 12/28/2019    Procedure: BRONCHOSCOPY with bilateral washing;  Surgeon: Katharina Salter MD;  Location: [facility] ENDOSCOPY;  Service: Pulmonary   • COLONOSCOPY  05/17/2013    eh, ih, tort, sig tics   • COLONOSCOPY N/A 5/10/2019    Non-thrombosed external hemorrhoids found on perianal exam, Diverticulosis, Tortuous  colon, One 5 mm polyp in the mid ascending colon, IH. Path: Tubular adenoma.    • ENDOSCOPY  03/19/2015    z line irreg, hh   • HIP OPEN REDUCTION Right 1/17/2018    Procedure: HIP OPEN REDUCTION INTERNAL FIXATION WITH DYNAMIC HIP SCREW;  Surgeon: Les Black MD;  Location: [facility];  Service:    • SPINE SURGERY     • TONSILLECTOMY     • TOTAL HIP ARTHROPLASTY REVISION Right 9/23/2019    Procedure: HIP  REVISION RIGHT;  Surgeon: Isauro Warner MD;  Location: [facility];  Service: Orthopedics       SLP Recommendation and Plan  SLP Swallowing Diagnosis: moderate, pharyngeal dysphagia (08/04/22 1100)  SLP Diet Recommendation: regular textures, thin liquids (08/04/22 1100)  Recommended Precautions and Strategies: upright posture during/after eating, small bites of food and sips of liquid, multiple swallows per bite of food, multiple swallows per sip of liquid, alternate between small bites of food and sips of liquid, assist with feeding (08/04/22 1100)  SLP Rec. for Method of Medication Administration: meds whole, meds crushed, as tolerated (08/04/22 1100)     Monitor for Signs of Aspiration: yes, notify SLP if any concerns (08/04/22 1100)     Swallow Criteria for Skilled Therapeutic Interventions Met: demonstrates skilled criteria (08/04/22 1100)  Anticipated Discharge Disposition (SLP): home with home health, home with OP services (08/04/22 1100)  Rehab Potential/Prognosis, Swallowing: good, to achieve stated therapy goals (08/04/22 1100)  Therapy Frequency (Swallow): PRN (08/04/22 1100)  Predicted Duration Therapy Intervention (Days): until discharge (08/04/22 1100)                                  Plan of Care Reviewed With: patient  Outcome Evaluation: VFSS completed. One episode of trace aspiration with thins with delayed cough response. No penetration honey or puree. Transient penetration with nectar. Moderate residue at the valleculae with regular solids, which patient cleared to mild with a  liquid wash. SLP recs regular and thins. Single drinks. Multiple swallows. Alternate liquids and solids. Assist with meals. Meds as kathryn.      SWALLOW EVALUATION (last 72 hours)     SLP Adult Swallow Evaluation     Row Name 08/04/22 1100 08/03/22 1500                Rehab Evaluation    Document Type evaluation  - evaluation  -KA       Subjective Information no complaints  - no complaints  -KA       Patient Observations -- alert;cooperative  -KA       Patient Effort good  -SH good  -KA       Symptoms Noted During/After Treatment -- none  -KA                General Information    Patient Profile Reviewed yes  -SH yes  -KA       Pertinent History Of Current Problem Admitted with back pain  - patient admitted with back pain, hx of CHF and COPD. SLP consult due to patietn coughing with PO. VFSS in 2017=WNL recommended regular solids with thin liquids  -KA       Current Method of Nutrition regular textures;thin liquids  - regular textures;thin liquids  -KA       Precautions/Limitations, Vision WFL;for purposes of eval  - WFL;for purposes of eval  -KA       Precautions/Limitations, Hearing WFL;for purposes of eval  - WFL;for purposes of eval  -KA       Prior Level of Function-Communication WFL  - WFL  -KA       Prior Level of Function-Swallowing no diet consistency restrictions  - no diet consistency restrictions  -KA       Plans/Goals Discussed with patient  - patient  -KA       Barriers to Rehab none identified  - none identified  -       Patient's Goals for Discharge eat/drink without coughing/choking  - eat/drink without coughing/choking  -KA                Pain Scale: Numbers Pre/Post-Treatment    Pretreatment Pain Rating 0/10 - no pain  - 0/10 - no pain  -KA       Posttreatment Pain Rating -- 0/10 - no pain  -KA                Oral Motor Structure and Function    Dentition Assessment -- natural, present and adequate  -KA       Secretion Management -- WNL/WFL  -KA       Mucosal Quality --  "moist, healthy  -       Volitional Swallow -- [facility]  -       Volitional Cough -- [facility]  -                Oral Musculature and Cranial Nerve Assessment    Oral Motor General Assessment -- [facility]  -                General Eating/Swallowing Observations    Respiratory Support Currently in Use -- room air  -       Eating/Swallowing Skills -- self-fed  -       Positioning During Eating -- upright in bed  -       Utensils Used -- spoon;cup;straw  -       Consistencies Trialed -- regular textures;soft textures;chopped;mixed consistency;pureed;thin liquids  -       Pre SpO2 (%) -- 98  -KA       Post SpO2 (%) -- 98  -KA                Clinical Swallow Eval    Clinical Swallow Evaluation Summary -- Patient demonstrated x1 cough after thin liquids via straw and x1 cough after mechanical soft trials, no overt s/s of pen/asp with thins via cup, puree and regular solids. Laryngeal elevation felt age appropriate and mastication functional. Patient c/o food/pills feeling stuck in throat sometimes. He reports usually liquid wash will help wash the pill or food down, however sometimes \"it comes back up into my mouth.\" patient denies symptoms worsening and reports this has been ongoing for years. Discussed options with patient, patient wishes to proceed with VFSS to further assess swallow.  -                MBS/VFSS Interpretation    VFSS Summary Patient presents with moderate pharyngeal dysphagia characterized by incomplete epiglottic deflection, reduced pharyngeal stripping wave, and reduced duration of cricpharyngeus opening. Significant cervical spine hardware noted. Normal swallow initiation with honey via spoon, cup, and straw. Mild residue at the valleculae after several spontaneous swallows. Transient penetration during the swallow with nectar via cup. Spill to the pyriforms before the swallow with nectar via straw. Deep, transient penetration noted twice with consecutive drinks. Transient penetration before the " swallow demonstrated once with consecutive drinks of nectar via straw. Mild valleculae residue present after multiple swallows. Transient penetration during the swallow with thins via cup. Mild pharyngeal residue post swallow. Rotary mastication with mech soft and regular. Spill to the pyriforms before the swallow. Mild valleculae residue after a double swallow. Transient, shallow penetration during the swallow with thins via straw. Mild pharyngeal residue post swallow. One episode of trace aspiration from pharyngeal residue after thin via straw, patient did exhibit a strong delayed cough response. Moderate valleculae residue after the swallow with regular. Mild persistent residue at the valleculae despite liquid wash.  VFSS images reviewed with the patient  - --                SLP Communication to Radiology    Summary Statement Radiologist present: Dr. Solitario. One episode of trace aspiration with thins with delayed cough response. No penetration honey or puree. Transient penetration with nectar. Moderate residue at the valleculae with regular solids, which patient cleared to mild with a liquid wash.  - --                SLP Evaluation Clinical Impression    SLP Swallowing Diagnosis moderate;pharyngeal dysphagia  - R/O pharyngeal dysphagia;esophageal dysphagia  -KA       Functional Impact risk of aspiration/pneumonia  - risk of aspiration/pneumonia  -KA       Rehab Potential/Prognosis, Swallowing good, to achieve stated therapy goals  - good, to achieve stated therapy goals  -KA       Swallow Criteria for Skilled Therapeutic Interventions Met demonstrates skilled criteria  - demonstrates skilled criteria  -KA                Recommendations    Therapy Frequency (Swallow) PRN  - PRN  -KA       Predicted Duration Therapy Intervention (Days) until discharge  - until discharge  -KA       SLP Diet Recommendation regular textures;thin liquids  - regular textures;thin liquids  -KA       Recommended  Diagnostics -- VFSS ([facility])  -       Recommended Precautions and Strategies upright posture during/after eating;small bites of food and sips of liquid;multiple swallows per bite of food;multiple swallows per sip of liquid;alternate between small bites of food and sips of liquid;assist with feeding  - upright posture during/after eating;small bites of food and sips of liquid;no straw  -KA       Oral Care Recommendations Oral Care BID/PRN  -SH Oral Care BID/PRN  -KA       SLP Rec. for Method of Medication Administration meds whole;meds crushed;as tolerated  - meds whole;meds crushed;as tolerated  -       Monitor for Signs of Aspiration yes;notify SLP if any concerns  - yes;notify SLP if any concerns  -       Anticipated Discharge Disposition (SLP) home with home health;home with  services  - unknown  -                Swallow Goals (SLP)    Swallow STGs diet tolerance goal selection (SLP)  - --       Diet Tolerance Goal Selection (SLP) Patient will tolerate trials of  -SH --                (STG) Patient will tolerate trials of    Consistencies Trialed (Tolerate trials) regular textures;thin liquids  - --       Desired Outcome (Tolerate trials) without signs/symptoms of aspiration  - --             User Key  (r) = Recorded By, (t) = Taken By, (c) = Cosigned By    Initials Name Effective Dates    Nitin Whitten MA,CCC-SLP 06/02/22 -     Madelyn Davies MS CCC-SLP 06/16/21 -                 EDUCATION  The patient has been educated in the following areas:   Dysphagia (Swallowing Impairment).        SLP GOALS     Row Name 08/04/22 1100             (STG) Patient will tolerate trials of    Consistencies Trialed (Tolerate trials) regular textures;thin liquids  -      Desired Outcome (Tolerate trials) without signs/symptoms of aspiration  -            User Key  (r) = Recorded By, (t) = Taken By, (c) = Cosigned By    Initials Name Provider Type    Madelyn Davies MS CCC-SLP Speech and Language  Pathologist                   Time Calculation:    Time Calculation- SLP     Row Name 08/04/22 1534             Time Calculation- SLP    SLP Start Time 1100  -SH      SLP Received On 08/04/22  -              Untimed Charges    08656-VU Motion Fluoro Eval Swallow Minutes 90  -SH              Total Minutes    Untimed Charges Total Minutes 90  -SH       Total Minutes 90  -SH            User Key  (r) = Recorded By, (t) = Taken By, (c) = Cosigned By    Initials Name Provider Type     Madelyn Welch MS CCC-SLP Speech and Language Pathologist                Therapy Charges for Today     Code Description Service Date Service Provider Modifiers Qty    [id number]  ST MOTION FLUORO EVAL SWALLOW 6 8/4/2022 Madelyn Welch MS CCC-SLP GN 1               Madelyn Welch MS CCC-BILL  8/4/2022

## 2022-08-04 NOTE — PLAN OF CARE
Goal Outcome Evaluation:  Plan of Care Reviewed With: patient        Progress: improving  Outcome Evaluation: No significant complaints noted during the night, pain tolerable, vss.

## 2022-08-04 NOTE — PROGRESS NOTES
Name: Tony Leonard ADMIT: 2022   : 1940  PCP: Erich Aviles MD    MRN: [id number] LOS: 3 days   AGE/SEX: 82 y.o. male  ROOM: [facility]     Subjective   Subjective   No acute events overnight.  Really not much improvement in his right lower extremity pain.       Objective   Objective   Vital Signs  Temp:  [97.7 °F (36.5 °C)-98.2 °F (36.8 °C)] 98 °F (36.7 °C)  Heart Rate:  [65-80] 80  Resp:  [18] 18  BP: (113-136)/(63-91) 124/67  SpO2:  [92 %-94 %] 93 %  on   ;   Device (Oxygen Therapy): room air  Body mass index is 25.12 kg/m².  Physical Exam  Constitutional:       Appearance: He is ill-appearing.      Comments: Chronically ill-appearing   HENT:      Head: Normocephalic and atraumatic.   Cardiovascular:      Rate and Rhythm: Normal rate and regular rhythm.   Pulmonary:      Effort: Pulmonary effort is normal. No respiratory distress.   Abdominal:      Palpations: Abdomen is soft.   Neurological:      Mental Status: He is alert and oriented to person, place, and time.      Motor: Weakness present.         Results Review     I reviewed the patient's new clinical results.  Results from last 7 days   Lab Units 22  0659 22  0727   WBC 10*3/mm3 6.50 6.94   HEMOGLOBIN g/dL 13.5 11.5*   PLATELETS 10*3/mm3 231 215     Results from last 7 days   Lab Units 22  0659 22  0727   SODIUM mmol/L 135* 139   POTASSIUM mmol/L 5.1 4.1   CHLORIDE mmol/L 103 105   CO2 mmol/L 20.0* 24.8   BUN mg/dL 21 23   CREATININE mg/dL 0.79 0.77   GLUCOSE mg/dL 127* 94   Estimated Creatinine Clearance: 80.4 mL/min (by C-G formula based on SCr of 0.79 mg/dL).  Results from last 7 days   Lab Units 22  0659   ALBUMIN g/dL 4.00   BILIRUBIN mg/dL 0.3   ALK PHOS U/L 134*   AST (SGOT) U/L 20   ALT (SGPT) U/L 13     Results from last 7 days   Lab Units 22  0659 22  0727   CALCIUM mg/dL 9.2 8.7   ALBUMIN g/dL 4.00  --    MAGNESIUM mg/dL 2.4  --        COVID19   Date Value Ref Range Status    08/01/2022 Not Detected Not Detected - Ref. Range Final   05/07/2022 Not Detected Not Detected - Ref. Range Final     No results found for: HGBA1C, POCGLU    FL Guided Pain Management Spine  This procedure was auto-finalized with no dictation required.    Scheduled Medications  budesonide, 0.5 mg, Nebulization, BID  FLUoxetine, 20 mg, Oral, Daily  furosemide, 40 mg, Oral, Daily  gabapentin, 300 mg, Oral, TID  ipratropium-albuterol, 3 mL, Nebulization, Q8H - RT  methylphenidate, 10 mg, Oral, Daily  Mirabegron ER, 50 mg, Oral, Daily  multivitamin with minerals, 1 tablet, Oral, Daily  pantoprazole, 40 mg, Oral, QAM  polyethylene glycol, 17 g, Oral, Daily  potassium chloride, 20 mEq, Oral, Daily  rOPINIRole, 0.5 mg, Oral, Nightly  vitamin B-12, 500 mcg, Oral, Daily    Infusions   Diet  Diet Regular; Cardiac       Assessment/Plan     Active Hospital Problems    Diagnosis  POA   • **Spinal stenosis, lumbar region with neurogenic claudication [M48.062]  Yes   • Compression deformity of vertebra [M43.9]  Unknown   • Chronic diastolic CHF (congestive heart failure) ([facility]) [I50.32]  Yes   • COPD (chronic obstructive pulmonary disease) ([facility]) [J44.9]  Yes   • Benign non-nodular prostatic hyperplasia without lower urinary tract symptoms [N40.0]  Yes      Resolved Hospital Problems   No resolved problems to display.     82-year-old man who came to the hospital with worsening back pain.  MRI showed chronic compression of L1, acute compression deformity of L3 with severe lumbar spondylosis.  Canal stenosis was noted to be worse at L4-L5 and orthospine was consulted    Spinal stenosis/compression deformity  Status post LESI with pain management  LSO brace ordered.  Continue pain control and Flexeril for spasms  Follow xrays per Orthospine     Diastolic heart failure  Continue home Lasix    COPD  Home nebulizers    · SCDs for DVT prophylaxis.  · Full code.  · Discussed with patient.        Alberto Tomlinson MD  [facility]  08/04/22  12:21 EDT     I wore protective equipment throughout this patient encounter including a face mask, gloves and protective eyewear.  Hand hygiene was performed before donning protective equipment and after removal when leaving the room.

## 2022-08-04 NOTE — THERAPY TREATMENT NOTE
Patient Name: Tony Leonard  : 1940    MRN: [id number]                              Today's Date: 2022       Admit Date: 2022    Visit Dx:     ICD-10-CM ICD-9-CM   1. Acute midline low back pain with right-sided sciatica  M54.41 724.2     724.3   2. Compression fracture of L3 lumbar vertebra, closed, initial encounter ([facility])  S32.030A 805.4   3. Lumbar disc herniation  M51.26 722.10   4. Spinal stenosis, lumbar region with neurogenic claudication  M48.062 724.03     Patient Active Problem List   Diagnosis   • Thrush, oral   • ADD (attention deficit disorder)   • Hemorrhoids   • Bronchiectasis with acute lower respiratory infection ([facility])   • Diverticul disease small and large intestine, no perforati or abscess   • Benign non-nodular prostatic hyperplasia without lower urinary tract symptoms   • Gastroesophageal reflux disease   • Malaise and fatigue   • Environmental allergies   • Hemoptysis   • Dyslipidemia   • Primary osteoarthritis involving multiple joints   • Pneumonia of right lower lobe due to infectious organism   • Abnormal EKG   • COPD (chronic obstructive pulmonary disease) ([facility])   • Mild malnutrition ([facility])   • Acute on chronic respiratory failure with hypoxia ([facility])   • Fracture, intertrochanteric, right femur, closed, initial encounter ([facility])   • Chronic diastolic CHF (congestive heart failure) ([facility])   • Hematoma of frontal scalp   • Postoperative anemia due to acute blood loss   • Urinary retention   • Hemorrhagic disorder due to circulating anticoagulants ([facility])   • History of fracture of right hip   • Closed fracture of right hip with routine healing   • Chronic low back pain with sciatica   • Change in bowel function   • Rectal bleeding   • Prostate cancer ([facility])   • On home oxygen therapy   • Acute viral bronchitis   • Chronic diastolic (congestive) heart failure ([facility])   • Peripheral neuropathy   • Plantar fasciitis   • COPD exacerbation ([facility])   • Bilateral lower extremity edema   •  Microscopic hematuria   • Acute midline low back pain with right-sided sciatica   • Spinal stenosis, lumbar region with neurogenic claudication   • Compression deformity of vertebra     Past Medical History:   Diagnosis Date   • ADHD (attention deficit hyperactivity disorder)    • Bronchiectasis ([facility])    • Colon polyp    • COPD (chronic obstructive pulmonary disease) ([facility])    • Diverticulosis    • Hard of hearing    • On home oxygen therapy     2 LITERS   • Pneumonia    • Prostate cancer ([facility]CC) 04/22/2019   • Spinal stenosis, lumbar region with neurogenic claudication 08/02/2022     Past Surgical History:   Procedure Laterality Date   • BRONCHOSCOPY N/A 4/30/2016    Procedure: BRONCHOSCOPY with BAL of right lower lobe and left  lower lobe.  ;  Surgeon: Nando Diaz MD;  Location: [facility] ENDOSCOPY;  Service:    • BRONCHOSCOPY N/A 4/28/2017    Procedure: BRONCHOSCOPY;  Surgeon: Katharina Salter MD;  Location: [facility] ENDOSCOPY;  Service:    • BRONCHOSCOPY Bilateral 6/29/2017    Procedure: BRONCHOSCOPY WITH WASHINGS;  Surgeon: Katharina Salter MD;  Location: [facility] ENDOSCOPY;  Service:    • BRONCHOSCOPY N/A 1/22/2018    Procedure: BRONCHOSCOPY AT BEDSIDE with BAL;  Surgeon: Katharina Salter MD;  Location: [facility] ENDOSCOPY;  Service:    • BRONCHOSCOPY N/A 1/30/2018    Procedure: BRONCHOSCOPY with BAL and washing;  Surgeon: Shreyas Wild MD;  Location: [facility] ENDOSCOPY;  Service:    • BRONCHOSCOPY Bilateral 4/24/2018    Procedure: BRONCHOSCOPY WITH WASHING;  Surgeon: Katharina Salter MD;  Location: [facility] ENDOSCOPY;  Service: Pulmonary   • BRONCHOSCOPY N/A 12/28/2019    Procedure: BRONCHOSCOPY with bilateral washing;  Surgeon: Katharina Salter MD;  Location: [facility] ENDOSCOPY;  Service: Pulmonary   • COLONOSCOPY  05/17/2013    eh, ih, tort, sig tics   • COLONOSCOPY N/A 5/10/2019    Non-thrombosed external hemorrhoids found on perianal exam, Diverticulosis, Tortuous colon, One 5 mm polyp in the mid ascending colon, IH. Path:  Tubular adenoma.    • ENDOSCOPY  03/19/2015    z line irreg, hh   • HIP OPEN REDUCTION Right 1/17/2018    Procedure: HIP OPEN REDUCTION INTERNAL FIXATION WITH DYNAMIC HIP SCREW;  Surgeon: Les Black MD;  Location: [facility] OR;  Service:    • SPINE SURGERY     • TONSILLECTOMY     • TOTAL HIP ARTHROPLASTY REVISION Right 9/23/2019    Procedure: HIP  REVISION RIGHT;  Surgeon: Isauro Warner MD;  Location: [facility] OR;  Service: Orthopedics      General Information     Row Name 08/04/22 1535          Physical Therapy Time and Intention    Document Type therapy note (daily note)  -CB     Mode of Treatment individual therapy;physical therapy  -CB     Row Name 08/04/22 1535          General Information    Existing Precautions/Restrictions fall;spinal;brace worn when out of bed  LSO present today  -CB     Row Name 08/04/22 1535          Cognition    Orientation Status (Cognition) oriented x 3  -CB     Row Name 08/04/22 1535          Safety Issues, Functional Mobility    Safety Issues Affecting Function (Mobility) insight into deficits/self-awareness;awareness of need for assistance;positioning of assistive device;safety precaution awareness;safety precautions follow-through/compliance  -           User Key  (r) = Recorded By, (t) = Taken By, (c) = Cosigned By    Initials Name Provider Type    CB Amparo Wilson PT Physical Therapist               Mobility     Row Name 08/04/22 1536          Bed Mobility    Bed Mobility sit-supine  -CB     Sit-Supine [place] (Bed Mobility) minimum assist (75% patient effort);2 person assist;verbal cues  -     Assistive Device (Bed Mobility) leg   -     Row Name 08/04/22 1536          Sit-Stand Transfer    Comment, (Sit-Stand Transfer) variable assist for STS; STS from toilet CGA using grab bar and min-modAx2 for STS from EOB with cues for safety and proper UE placement  -CB     Row Name 08/04/22 1536          Gait/Stairs (Locomotion)    [place] Level (Gait)  contact guard;2 person assist;verbal cues  -CB     Assistive Device (Gait) walker, front-wheeled  -CB     Distance in Feet (Gait) 45ft  -CB     Deviations/Abnormal Patterns (Gait) gait speed decreased;stride length decreased  -CB     Bilateral Gait Deviations forward flexed posture;heel strike decreased  -CB     Comment, (Gait/Stairs) max cues for upright posture during ambulation as pt is very forward flexed  -CB           User Key  (r) = Recorded By, (t) = Taken By, (c) = Cosigned By    Initials Name Provider Type    Amparo Gutierrez PT Physical Therapist               Obj/Interventions     Row Name 08/04/22 1538          Balance    Balance Assessment standing static balance;standing dynamic balance;sitting dynamic balance;sitting static balance  -CB     Static Sitting Balance standby assist  -CB     Dynamic Sitting Balance standby assist  -CB     Position, Sitting Balance sitting edge of bed  -CB     Static Standing Balance contact guard  -CB     Dynamic Standing Balance contact guard;2-person assist  -CB     Position/Device Used, Standing Balance supported;walker, front-wheeled  -CB     Balance Interventions sitting;standing;sit to stand;supported;static;dynamic;minimal challenge  -CB           User Key  (r) = Recorded By, (t) = Taken By, (c) = Cosigned By    Initials Name Provider Type    Amparo Gutierrez, STEFF Physical Therapist               Goals/Plan    No documentation.                Clinical Impression     Row Name 08/04/22 1536          Pain    Pretreatment Pain Rating 10/10  -CB     Posttreatment Pain Rating 10/10  -CB     Pain Location - Side/Orientation Right  -CB     Pain Location - hip  -CB     Pain Intervention(s) Repositioned;Ambulation/increased activity  -CB     Row Name 08/04/22 8121          Plan of Care Review    Plan of Care Reviewed With patient  -CB     Progress improving  -CB     Outcome Evaluation Patient seen for PT this PM. He required variable assist with STS today. He was CGA for  STS from toilet using grab bar but min-modAx2 for STS from EOB. Cues for proper UE placement and safety. He ambulated 45ft using rwx requiring CGAx2 and max cues for upright posture as he is very forward flexed during ambulation. He reports R hip pain 10/10 and RN notified. Will continue to progress pt as he tolerates.  -CB     Row Name 08/04/22 1538          Positioning and Restraints    Pre-Treatment Position in bed  -CB     Post Treatment Position bed  -CB     In Bed fowlers;call light within reach;encouraged to call for assist;exit alarm on;with nsg;with other staff;side rails up x2  -CB           User Key  (r) = Recorded By, (t) = Taken By, (c) = Cosigned By    Initials Name Provider Type    Amparo Gutierrez PT Physical Therapist               Outcome Measures     Row Name 08/04/22 1540          How much help from another person do you currently need...    Turning from your back to your side while in flat bed without using bedrails? 3  -CB     Moving from lying on back to sitting on the side of a flat bed without bedrails? 3  -CB     Moving to and from a bed to a chair (including a wheelchair)? 3  -CB     Standing up from a chair using your arms (e.g., wheelchair, bedside chair)? 2  -CB     Climbing 3-5 steps with a railing? 2  -CB     To walk in hospital room? 3  -CB     AM-PAC 6 Clicks Score (PT) 16  -CB     Highest level of mobility 5 --> Static standing  -CB     Row Name 08/04/22 1540          Functional Assessment    Outcome Measure Options AM-PAC 6 Clicks Basic Mobility (PT)  -CB           User Key  (r) = Recorded By, (t) = Taken By, (c) = Cosigned By    Initials Name Provider Type    Amparo Gutierrez, PT Physical Therapist                             Physical Therapy Education                 Title: PT OT SLP Therapies (Done)     Topic: Physical Therapy (Done)     Point: Mobility training (Done)     Learning Progress Summary           Patient Acceptance, E,TB, VU,NR by CB at 8/4/2022 1541     Acceptance, E,TB, VU,NR by CB at 8/3/2022 0903                   Point: Home exercise program (Done)     Learning Progress Summary           Patient Acceptance, E,TB, VU,NR by CB at 8/3/2022 0903                   Point: Body mechanics (Done)     Learning Progress Summary           Patient Acceptance, E,TB, VU,NR by CB at 8/4/2022 1541    Acceptance, E,TB, VU,NR by CB at 8/3/2022 0903                   Point: Precautions (Done)     Learning Progress Summary           Patient Acceptance, E,TB, VU,NR by CB at 8/4/2022 1541    Acceptance, E,TB, VU,NR by CB at 8/3/2022 0903                               User Key     Initials Effective Dates Name Provider Type Discipline     10/22/21 -  Amparo Wilson, PT Physical Therapist PT              PT Recommendation and Plan  Planned Therapy Interventions (PT): balance training, bed mobility training, gait training, home exercise program, patient/family education, strengthening, transfer training, postural re-education, orthotic fitting/training  Plan of Care Reviewed With: patient  Progress: improving  Outcome Evaluation: Patient seen for PT this PM. He required variable assist with STS today. He was CGA for STS from toilet using grab bar but min-modAx2 for STS from EOB. Cues for proper UE placement and safety. He ambulated 45ft using rwx requiring CGAx2 and max cues for upright posture as he is very forward flexed during ambulation. He reports R hip pain 10/10 and RN notified. Will continue to progress pt as he tolerates.     Time Calculation:    PT Charges     Row Name 08/04/22 1541             Time Calculation    Start Time 1500  -CB      Stop Time 1517  -CB      Time Calculation (min) 17 min  -CB      PT Received On 08/04/22  -      PT - Next Appointment 08/05/22  -              Time Calculation- PT    Total Timed Code Minutes- PT 17 minute(s)  -CB              Timed Charges    67582 - Gait Training Minutes  10  -CB      25110 - PT Therapeutic Activity Minutes 7  -CB               Total Minutes    Timed Charges Total Minutes 17  -CB       Total Minutes 17  -CB            User Key  (r) = Recorded By, (t) = Taken By, (c) = Cosigned By    Initials Name Provider Type    CB Amparo Wilson PT Physical Therapist              Therapy Charges for Today     Code Description Service Date Service Provider Modifiers Qty    26691207227  PT THERAPEUTIC ACT EA 15 MIN 8/3/2022 Amparo Wilson, PT GP 1    50233234849 HC PT EVAL MOD COMPLEXITY 3 8/3/2022 Amparo Wilson, PT GP 1    48367152916 HC GAIT TRAINING EA 15 MIN 8/4/2022 Amparo Wilson, PT GP 1    [id number]  PT THER SUPP EA 15 MIN 8/4/2022 Amparo Wilson, PT GP 1          PT G-Codes  Outcome Measure Options: AM-PAC 6 Clicks Basic Mobility (PT)  AM-PAC 6 Clicks Score (PT): 16  AM-PAC 6 Clicks Score (OT): 15    Amparo Wilson PT  8/4/2022

## 2022-08-05 LAB
ANION GAP SERPL CALCULATED.3IONS-SCNC: 13.5 MMOL/L (ref 5–15)
BUN SERPL-MCNC: 34 MG/DL (ref 8–23)
BUN/CREAT SERPL: 37.8 (ref 7–25)
CALCIUM SPEC-SCNC: 8.8 MG/DL (ref 8.6–10.5)
CHLORIDE SERPL-SCNC: 98 MMOL/L (ref 98–107)
CO2 SERPL-SCNC: 24.5 MMOL/L (ref 22–29)
CREAT SERPL-MCNC: 0.9 MG/DL (ref 0.76–1.27)
DEPRECATED RDW RBC AUTO: 43.7 FL (ref 37–54)
EGFRCR SERPLBLD CKD-EPI 2021: 84.2 ML/MIN/1.73
ERYTHROCYTE [DISTWIDTH] IN BLOOD BY AUTOMATED COUNT: 12.7 % (ref 12.3–15.4)
GLUCOSE SERPL-MCNC: 98 MG/DL (ref 65–99)
HCT VFR BLD AUTO: 36.5 % (ref 37.5–51)
HGB BLD-MCNC: 12.7 G/DL (ref 13–17.7)
MCH RBC QN AUTO: 32.7 PG (ref 26.6–33)
MCHC RBC AUTO-ENTMCNC: 34.8 G/DL (ref 31.5–35.7)
MCV RBC AUTO: 94.1 FL (ref 79–97)
PLATELET # BLD AUTO: 247 10*3/MM3 (ref 140–450)
PMV BLD AUTO: 9.7 FL (ref 6–12)
POTASSIUM SERPL-SCNC: 4.7 MMOL/L (ref 3.5–5.2)
RBC # BLD AUTO: 3.88 10*6/MM3 (ref 4.14–5.8)
SODIUM SERPL-SCNC: 136 MMOL/L (ref 136–145)
WBC NRBC COR # BLD: 8.33 10*3/MM3 (ref 3.4–10.8)

## 2022-08-05 PROCEDURE — 97110 THERAPEUTIC EXERCISES: CPT

## 2022-08-05 PROCEDURE — 94799 UNLISTED PULMONARY SVC/PX: CPT

## 2022-08-05 PROCEDURE — 85027 COMPLETE CBC AUTOMATED: CPT | Performed by: STUDENT IN AN ORGANIZED HEALTH CARE EDUCATION/TRAINING PROGRAM

## 2022-08-05 PROCEDURE — 80048 BASIC METABOLIC PNL TOTAL CA: CPT | Performed by: STUDENT IN AN ORGANIZED HEALTH CARE EDUCATION/TRAINING PROGRAM

## 2022-08-05 PROCEDURE — 25010000002 COVID-19 MRNA VAC-TRIS(PFIZER) 30 MCG/0.3ML SUSPENSION: Performed by: STUDENT IN AN ORGANIZED HEALTH CARE EDUCATION/TRAINING PROGRAM

## 2022-08-05 PROCEDURE — 99232 SBSQ HOSP IP/OBS MODERATE 35: CPT | Performed by: ORTHOPAEDIC SURGERY

## 2022-08-05 PROCEDURE — 91305 COVID-19 MRNA VAC-TRIS(PFIZER) 30 MCG/0.3ML SUSPENSION: CPT | Performed by: STUDENT IN AN ORGANIZED HEALTH CARE EDUCATION/TRAINING PROGRAM

## 2022-08-05 PROCEDURE — 0054A HC ADM SARSCV2 30MCG TRS-SUCR BOOSTER: CPT

## 2022-08-05 PROCEDURE — 94664 DEMO&/EVAL PT USE INHALER: CPT

## 2022-08-05 RX ADMIN — HYDROCODONE BITARTRATE AND ACETAMINOPHEN 1 TABLET: 7.5; 325 TABLET ORAL at 19:41

## 2022-08-05 RX ADMIN — ROPINIROLE HYDROCHLORIDE 0.5 MG: 0.5 TABLET, FILM COATED ORAL at 19:42

## 2022-08-05 RX ADMIN — POLYETHYLENE GLYCOL 3350 17 G: 17 POWDER, FOR SOLUTION ORAL at 09:41

## 2022-08-05 RX ADMIN — PANTOPRAZOLE SODIUM 40 MG: 40 TABLET, DELAYED RELEASE ORAL at 06:15

## 2022-08-05 RX ADMIN — IPRATROPIUM BROMIDE AND ALBUTEROL SULFATE 3 ML: .5; 3 SOLUTION RESPIRATORY (INHALATION) at 07:44

## 2022-08-05 RX ADMIN — METHYLPHENIDATE HYDROCHLORIDE 10 MG: 10 TABLET ORAL at 09:44

## 2022-08-05 RX ADMIN — FUROSEMIDE 40 MG: 40 TABLET ORAL at 09:40

## 2022-08-05 RX ADMIN — GABAPENTIN 300 MG: 300 CAPSULE ORAL at 09:41

## 2022-08-05 RX ADMIN — IPRATROPIUM BROMIDE AND ALBUTEROL SULFATE 3 ML: .5; 3 SOLUTION RESPIRATORY (INHALATION) at 23:28

## 2022-08-05 RX ADMIN — GABAPENTIN 300 MG: 300 CAPSULE ORAL at 19:42

## 2022-08-05 RX ADMIN — FLUOXETINE HYDROCHLORIDE 20 MG: 20 CAPSULE ORAL at 09:40

## 2022-08-05 RX ADMIN — IPRATROPIUM BROMIDE AND ALBUTEROL SULFATE 3 ML: .5; 3 SOLUTION RESPIRATORY (INHALATION) at 15:19

## 2022-08-05 RX ADMIN — BUDESONIDE 0.5 MG: 0.5 INHALANT ORAL at 23:31

## 2022-08-05 RX ADMIN — HYDROCODONE BITARTRATE AND ACETAMINOPHEN 1 TABLET: 7.5; 325 TABLET ORAL at 09:41

## 2022-08-05 RX ADMIN — POTASSIUM CHLORIDE 20 MEQ: 10 TABLET, EXTENDED RELEASE ORAL at 09:41

## 2022-08-05 RX ADMIN — BUDESONIDE 0.5 MG: 0.5 INHALANT ORAL at 07:47

## 2022-08-05 RX ADMIN — CYCLOBENZAPRINE 10 MG: 10 TABLET, FILM COATED ORAL at 09:40

## 2022-08-05 RX ADMIN — Medication 500 MCG: at 09:40

## 2022-08-05 RX ADMIN — MULTIPLE VITAMINS W/ MINERALS TAB 1 TABLET: TAB at 09:40

## 2022-08-05 RX ADMIN — MIRABEGRON 50 MG: 25 TABLET, FILM COATED, EXTENDED RELEASE ORAL at 09:40

## 2022-08-05 NOTE — PLAN OF CARE
Goal Outcome Evaluation:  Plan of Care Reviewed With: patient        Progress: improving  Outcome Evaluation: Slept most of the night, no significant complaints noted, vss.

## 2022-08-05 NOTE — DISCHARGE SUMMARY
Patient Name: Tony Leonard  : 1940  MRN: [id number]    Date of Admission: 2022  Date of Discharge:  2022  Primary Care Physician: Erich Aviles MD      Chief Complaint:   Back Pain and Extremity Pain (RLE)      Discharge Diagnoses     Active Hospital Problems    Diagnosis  POA   • **Spinal stenosis, lumbar region with neurogenic claudication [M48.062]  Yes   • Compression deformity of vertebra [M43.9]  Unknown   • Chronic diastolic CHF (congestive heart failure) (formerly [facility]) [I50.32]  Yes   • COPD (chronic obstructive pulmonary disease) (formerly [facility]) [J44.9]  Yes   • Benign non-nodular prostatic hyperplasia without lower urinary tract symptoms [N40.0]  Yes      Resolved Hospital Problems   No resolved problems to display.        Hospital Course       Mr. Salomon is a 82 year old male with a past medical history of chronic diastolic heart failure, COPD, BPH who presented to the hospital with complaints of worsening radicular back pain. He has a history of chronic back pain followed previously by pain management. He was seen most recently by Dr. Gordon on 22 where MRI was ordered, but had worsening pain with right radicular symptoms prompting him to come to the ED. MRI from  showed chronic compression of L1, acute compression deformity of L3 with severe lumbar spondylosis. Canal stenosis was noted to be worse at L4-5. Ortho spine was consulted.  He was deemed to be a poor candidate for a kyphoplasty due to distorted anatomy.  He underwent a lumbar epidural injection on  which unfortunately did not improve his symptoms to an appreciable extent.     Day of Discharge       Physical Exam:  Temp:  [97.6 °F (36.4 °C)-97.9 °F (36.6 °C)] 97.6 °F (36.4 °C)  Heart Rate:  [58-90] 73  Resp:  [16-20] 18  BP: (109-125)/(64-83) 122/83  Body mass index is 25.12 kg/m².  Physical Exam  Constitutional:       General: He is not in acute distress.  HENT:      Head: Normocephalic and atraumatic.   Cardiovascular:       Rate and Rhythm: Normal rate and regular rhythm.   Pulmonary:      Effort: Pulmonary effort is normal. No respiratory distress.   Abdominal:      General: There is no distension.      Palpations: Abdomen is soft.      Tenderness: There is no abdominal tenderness.   Musculoskeletal:         General: No swelling or tenderness.   Neurological:      General: No focal deficit present.      Mental Status: He is alert and oriented to person, place, and time.         Consultants     Consult Orders (all) (From admission, onward)     Start     Ordered    08/02/22 0814  Inpatient [facility] Consult  Once        Specialty:  Pain Medicine  Provider:  (Not yet assigned)    08/02/22 0814    08/01/22 2134  Inpatient Orthopedic Surgery Consult  Once        Specialty:  Orthopedic Surgery  Provider:  Andrzej Gordon MD    08/01/22 2133    08/01/22 1509  [facility] (on-call MD unless specified) Details  Once        Specialty:  Hospitalist  Provider:  Elizabet Parra MD    08/01/22 1508    08/01/22 1454  Neurosurgery (on-call MD unless specified)  Once,   Status:  Canceled        Specialty:  Neurosurgery  Provider:  (Not yet assigned)    08/01/22 1453    08/01/22 1311  Ortho (on-call MD unless specified)  Once        Specialty:  Orthopedic Surgery  Provider:  Andrzej Gordon MD    08/01/22 1311              Procedures     Imaging Results (All)     Procedure Component Value Units Date/Time    FL Video Swallow With Speech Single Contrast [[id number]] Collected: 08/04/22 1549     Updated: 08/04/22 1552    Narrative:      VIDEO SWALLOWING EXAMINATION BY SPEECH PATHOLOGY     Clinical: Dysphasia     Video swallowing examination performed under the direction of speech  pathology. Imaging reviewed by radiologist who concurs with the  findings.     Speech pathology summary:Radiologist present: Dr. Solitario. One episode  of trace aspiration with thins with delayed cough response. No  penetration honey or puree.  Transient penetration with nectar. Moderate  residue at the valleculae with regular solids, which patient cleared to  mild with a liquid wash.     FLUOROSCOPY TIME: 2.3 minutes, 4187 images.     This report was finalized on 8/4/2022 3:49 PM by Dr. Richard Solitario M.D.       XR Spine Lumbar AP & Lateral [[id number]] Collected: 08/04/22 1420     Updated: 08/04/22 1426    Narrative:      XR HIP WITH OR WITHOUT PELVIS 2-3 VIEWS RIGHT-, XR SPINE LUMBAR AP AND  LATERAL-     CLINICAL INFORMATION:  Compression fracture, radiculopathy, hip pain.     COMPARISON: AP pelvis right hip 05/24/2022.     HIP/PELVIS FINDINGS: Total right hip replacement prosthesis remains  stable, no component loosening or dislocation seen. No acute osseous  abnormality of the right femur or hemipelvis. Healed right pubic rami  fractures. Surrounding soft tissues have a satisfactory appearance.     CONCLUSION: Right total hip replacement prosthesis is satisfactory in  appearance, no acute osseous abnormality.     LUMBAR SPINE FINDINGS: The lower thoracic and lumbar spine both obscured  due to barium within the stomach and small bowel. L1 compression  deformity similar to 02/23/2022 CT. There is multilevel disc  degeneration involving the lower thoracic as well as the lumbar spine.  There is mid to lower lumbar facet hypertrophy seen. No acute  compression deformity identified. There is thoracolumbar scoliosis  convexity towards the left.     CONCLUSION: No acute compression deformity, old L1 compression deformity  similar to 02/23/2022. There is substantial degenerative change as  described above. Limited evaluation of the lumbar spine, superimposed  barium within the stomach and small bowel.     This report was finalized on 8/4/2022 2:23 PM by Dr. Richard Solitario M.D.       XR Hip With or Without Pelvis 2 - 3 View Right [[id number]] Collected: 08/04/22 1420     Updated: 08/04/22 1426    Narrative:      XR HIP WITH OR WITHOUT PELVIS 2-3 VIEWS RIGHT-, XR  SPINE LUMBAR AP AND  LATERAL-     CLINICAL INFORMATION:  Compression fracture, radiculopathy, hip pain.     COMPARISON: AP pelvis right hip 05/24/2022.     HIP/PELVIS FINDINGS: Total right hip replacement prosthesis remains  stable, no component loosening or dislocation seen. No acute osseous  abnormality of the right femur or hemipelvis. Healed right pubic rami  fractures. Surrounding soft tissues have a satisfactory appearance.     CONCLUSION: Right total hip replacement prosthesis is satisfactory in  appearance, no acute osseous abnormality.     LUMBAR SPINE FINDINGS: The lower thoracic and lumbar spine both obscured  due to barium within the stomach and small bowel. L1 compression  deformity similar to 02/23/2022 CT. There is multilevel disc  degeneration involving the lower thoracic as well as the lumbar spine.  There is mid to lower lumbar facet hypertrophy seen. No acute  compression deformity identified. There is thoracolumbar scoliosis  convexity towards the left.     CONCLUSION: No acute compression deformity, old L1 compression deformity  similar to 02/23/2022. There is substantial degenerative change as  described above. Limited evaluation of the lumbar spine, superimposed  barium within the stomach and small bowel.     This report was finalized on 8/4/2022 2:23 PM by Dr. Richard Solitario M.D.       FL Guided Pain Management Spine [[id number]] Resulted: 08/02/22 1328     Updated: 08/02/22 1328    Narrative:      This procedure was auto-finalized with no dictation required.          Pertinent Labs     Results from last 7 days   Lab Units 08/05/22  0644 08/04/22  1524 08/03/22  0659 08/02/22  0727   WBC 10*3/mm3 8.33 10.61 6.50 6.94   HEMOGLOBIN g/dL 12.7* 13.6 13.5 11.5*   PLATELETS 10*3/mm3 247 283 231 215     Results from last 7 days   Lab Units 08/05/22  0644 08/04/22  1524 08/03/22  0659 08/02/22  0727   SODIUM mmol/L 136 140 135* 139   POTASSIUM mmol/L 4.7 4.5 5.1 4.1   CHLORIDE mmol/L 98 100 103 105    CO2 mmol/L 24.5 24.1 20.0* 24.8   BUN mg/dL 34* 29* 21 23   CREATININE mg/dL 0.90 1.05 0.79 0.77   GLUCOSE mg/dL 98 89 127* 94   Estimated Creatinine Clearance: 70.6 mL/min (by C-G formula based on SCr of 0.9 mg/dL).  Results from last 7 days   Lab Units 08/03/22  0659   ALBUMIN g/dL 4.00   BILIRUBIN mg/dL 0.3   ALK PHOS U/L 134*   AST (SGOT) U/L 20   ALT (SGPT) U/L 13     Results from last 7 days   Lab Units 08/05/22  0644 08/04/22  1524 08/03/22  0659 08/02/22  0727   CALCIUM mg/dL 8.8 9.2 9.2 8.7   ALBUMIN g/dL  --   --  4.00  --    MAGNESIUM mg/dL  --   --  2.4  --                Invalid input(s): LDLCALC        Test Results Pending at Discharge       Discharge Details        Discharge Medications      Changes to Medications      Instructions Start Date   albuterol 1.25 MG/3ML nebulizer solution  Commonly known as: ACCUNEB  What changed: Another medication with the same name was changed. Make sure you understand how and when to take each.   1 ampule, Inhalation      albuterol (2.5 MG/3ML) 0.083% nebulizer solution  Commonly known as: PROVENTIL  What changed: when to take this   2.5 mg, Nebulization, 4 Times Daily - RT      gabapentin 300 MG capsule  Commonly known as: NEURONTIN  What changed:   · how much to take  · when to take this   300 mg, Oral, 3 Times Daily         Continue These Medications      Instructions Start Date   Anoro Ellipta 62.5-25 MCG/INH aerosol powder  inhaler  Generic drug: umeclidinium-vilanterol   USE 1 INHALATION DAILY      budesonide 0.5 MG/2ML nebulizer solution  Commonly known as: PULMICORT   0.5 mg, Nebulization, 2 Times Daily, 1 vial only twice daily      calcium carbonate 500 MG chewable tablet  Commonly known as: TUMS   1 tablet, Oral, As Needed      colestipol 5 g granules  Commonly known as: COLESTID   5 g, Oral, 2 Times Daily, Prn after diarrhea      FLUoxetine 20 MG capsule  Commonly known as: PROzac   TAKE 1 CAPSULE DAILY      furosemide 40 MG tablet  Commonly known as:  LASIX   40 mg, Oral      ketoconazole 2 % shampoo  Commonly known as: NIZORAL   APPLY TOPICALLY TO THE APPROPRIATE AREA AS DIRECTED TWO TIMES A WEEK      methylphenidate 10 MG tablet  Commonly known as: Ritalin   10 mg, Oral, Daily, For ADD 3 months      multivitamin with minerals tablet tablet   1 tablet, Oral, Daily      Myrbetriq 25 MG tablet sustained-release 24 hour 24 hr tablet  Generic drug: Mirabegron ER   No dose, route, or frequency recorded.      naproxen 500 MG tablet  Commonly known as: Naprosyn   500 mg, Oral, 2 Times Daily With Meals      oxyCODONE-acetaminophen 7.5-325 MG per tablet  Commonly known as: PERCOCET   1 tablet, Oral, Every 8 Hours PRN, 30 day prescription.      OXYGEN-HELIUM IN   2.5 L, Inhalation, Daily PRN      pantoprazole 40 MG EC tablet  Commonly known as: PROTONIX   40 mg, Oral, Daily      polyethylene glycol 17 g packet  Commonly known as: MIRALAX   DISSOLVE ONE PACKET IN 8OZ LIQUID & DRINK BY MOUTH DAILY      potassium chloride 20 MEQ CR tablet  Commonly known as: K-DUR,KLOR-CON   1 tablet, Oral, Daily      Rollator Ultra-Light misc   1 application, Does not apply, Daily, Severe DJD      rOPINIRole 0.5 MG tablet  Commonly known as: REQUIP   TAKE 1 TO 2 TABLETS EVERY NIGHT 1 HOUR BEFORE BEDTIME      sodium chloride 7 % nebulizer solution nebulizer solution   No dose, route, or frequency recorded.      terbinafine 250 MG tablet  Commonly known as: lamiSIL   No dose, route, or frequency recorded.      TiZANidine 6 MG capsule  Commonly known as: ZANAFLEX   TAKE 1 CAPSULE EVERY NIGHT      Turmeric 500 MG capsule   Oral, Daily      vitamin B-12 2500 MCG sublingual tablet tablet  Commonly known as: CYANOCOBALAMIN   2,500 mcg, Oral, Every Evening             Allergies   Allergen Reactions   • Daliresp [Roflumilast] Anaphylaxis   • Latex Rash   • Sulfa Antibiotics Rash         Discharge Disposition:  Home or Self Care    Discharge Diet:  Diet Order   Procedures   • Diet Regular; Cardiac        Discharge Activity:       CODE STATUS:    Code Status and Medical Interventions:   Ordered at: 08/01/22 1757     Code Status (Patient has no pulse and is not breathing):    CPR (Attempt to Resuscitate)     Medical Interventions (Patient has pulse or is breathing):    Full Support       Future Appointments   Date Time Provider Department Center   9/1/2022 10:30 AM Erich Aviles MD [facility]   9/13/2022  2:20 PM Anum Bhatt MD [facility]     Additional Instructions for the Follow-ups that You Need to Schedule     Discharge Follow-up with Specialty: Orthopedic spine; 3 Weeks   As directed      Specialty: Orthopedic spine    Follow Up: 3 Weeks    Follow Up Details: Return to the office to see Dr. Andrzej Gordon         Discharge Follow-up with Specified Provider: Orthopedics; 2 Weeks   As directed      To: Orthopedics    Follow Up: 2 Weeks    Follow Up Details: Return to the office to see Dr. Isauro Warner for Right hip            Contact information for follow-up providers     Erich Aviles MD .    Specialty: Internal Medicine  Contact information:  [address] [address]  [phone]                   Contact information for after-discharge care     Destination     [facility] .    Service: Skilled Nursing  Contact information:  [address] [facility] [address] [phone]                             Additional Instructions for the Follow-ups that You Need to Schedule     Discharge Follow-up with Specialty: Orthopedic spine; 3 Weeks   As directed      Specialty: Orthopedic spine    Follow Up: 3 Weeks    Follow Up Details: Return to the office to see Dr. Andrzej Gordon         Discharge Follow-up with Specified Provider: Orthopedics; 2 Weeks   As directed      To: Orthopedics    Follow Up: 2 Weeks    Follow Up Details: Return to the office to see Dr. Isauro aWrner for Right hip           Time Spent on Discharge:  Greater than 30  minutes      Alberto Tomlinson MD  [facility]  08/05/22  15:32 EDT

## 2022-08-05 NOTE — PROGRESS NOTES
Significant improvement at rest, still hip pain with mobility but that is improving as well.  He has back pain but the hip pain seems worse at this point.  Course he has a chronic scoliosis and a recent L3 fracture which is not amenable to kyphoplasty.  MRI obtained last week did show multilevel stenosis in the foramina but not much going on centrally.  he moves the legs well and exhibits good strength bilaterally.  He seems to be slightly tender over the trochanter but no fluctuance redness induration.  X-rays of the lumbar spine obtained yesterday show no change in the position of the fracture compared to office films obtained last week and despite the extensive spinal deformity and scoliosis is overall acceptable.  Hip films show some subtle changes which are unlikely to be causing pain to this extent but certainly could be contributing to a bursitis in the hip.  I will be out of the office and unavailable for the next 10 days.  I do not think he is a good candidate for surgery and certainly not for kyphoplasty since the pain is predominantly radicular and the anatomy is too distorted to put him through the risk of general anesthetic in order to hope that stabilization of the fracture will somehow minimize any radicular pain.  I would favor repeating epidural injections on an outpatient basis, and some type of subacute rehab transfer although I do not think that he is going to remain independent for much longer and this idea seems to cause him much distress.

## 2022-08-05 NOTE — CASE MANAGEMENT/SOCIAL WORK
Continued Stay Note  [facility]     Patient Name: Tony Leonard  MRN: [id number]  Today's Date: 8/5/2022    Admit Date: 8/1/2022     Discharge Plan     Row Name 08/05/22 1337       Plan    Plan [facility]- bed available Saturday, 8/6    Patient/Family in Agreement with Plan yes    Plan Comments CCP confirmed with patient and he is agreeable to bed tomorrow, 8/6 at [facility]. [facility] aware. Patient does need transport. CCP arranged a [employer] W[employer]C van tomorrow at 2PM. Packet placed in patient's chart. CCP to follow. Serjio DC RN CCP               Discharge Codes    No documentation.               Expected Discharge Date and Time     Expected Discharge Date Expected Discharge Time    Aug 5, 2022             Serjio Painting RN

## 2022-08-05 NOTE — THERAPY TREATMENT NOTE
Patient Name: Tony Leonard  : 1940    MRN: [id number]                              Today's Date: 2022       Admit Date: 2022    Visit Dx:     ICD-10-CM ICD-9-CM   1. Acute midline low back pain with right-sided sciatica  M54.41 724.2     724.3   2. Compression fracture of L3 lumbar vertebra, closed, initial encounter ([facility])  S32.030A 805.4   3. Lumbar disc herniation  M51.26 722.10   4. Spinal stenosis, lumbar region with neurogenic claudication  M48.062 724.03     Patient Active Problem List   Diagnosis   • Thrush, oral   • ADD (attention deficit disorder)   • Hemorrhoids   • Bronchiectasis with acute lower respiratory infection ([facility])   • Diverticul disease small and large intestine, no perforati or abscess   • Benign non-nodular prostatic hyperplasia without lower urinary tract symptoms   • Gastroesophageal reflux disease   • Malaise and fatigue   • Environmental allergies   • Hemoptysis   • Dyslipidemia   • Primary osteoarthritis involving multiple joints   • Pneumonia of right lower lobe due to infectious organism   • Abnormal EKG   • COPD (chronic obstructive pulmonary disease) ([facility])   • Mild malnutrition ([facility])   • Acute on chronic respiratory failure with hypoxia ([facility])   • Fracture, intertrochanteric, right femur, closed, initial encounter ([facility])   • Chronic diastolic CHF (congestive heart failure) ([facility])   • Hematoma of frontal scalp   • Postoperative anemia due to acute blood loss   • Urinary retention   • Hemorrhagic disorder due to circulating anticoagulants ([facility])   • History of fracture of right hip   • Closed fracture of right hip with routine healing   • Chronic low back pain with sciatica   • Change in bowel function   • Rectal bleeding   • Prostate cancer ([facility])   • On home oxygen therapy   • Acute viral bronchitis   • Chronic diastolic (congestive) heart failure ([facility])   • Peripheral neuropathy   • Plantar fasciitis   • COPD exacerbation ([facility])   • Bilateral lower extremity edema   •  Microscopic hematuria   • Acute midline low back pain with right-sided sciatica   • Spinal stenosis, lumbar region with neurogenic claudication   • Compression deformity of vertebra     Past Medical History:   Diagnosis Date   • ADHD (attention deficit hyperactivity disorder)    • Bronchiectasis ([facility])    • Colon polyp    • COPD (chronic obstructive pulmonary disease) ([facility])    • Diverticulosis    • Hard of hearing    • On home oxygen therapy     2 LITERS   • Pneumonia    • Prostate cancer (HCC) 04/22/2019   • Spinal stenosis, lumbar region with neurogenic claudication 08/02/2022     Past Surgical History:   Procedure Laterality Date   • BRONCHOSCOPY N/A 4/30/2016    Procedure: BRONCHOSCOPY with BAL of right lower lobe and left  lower lobe.  ;  Surgeon: Nanod Diaz MD;  Location: [facility] ENDOSCOPY;  Service:    • BRONCHOSCOPY N/A 4/28/2017    Procedure: BRONCHOSCOPY;  Surgeon: Katharina Salter MD;  Location: [facility] ENDOSCOPY;  Service:    • BRONCHOSCOPY Bilateral 6/29/2017    Procedure: BRONCHOSCOPY WITH WASHINGS;  Surgeon: Katharina Salter MD;  Location: [facility] ENDOSCOPY;  Service:    • BRONCHOSCOPY N/A 1/22/2018    Procedure: BRONCHOSCOPY AT BEDSIDE with BAL;  Surgeon: Katharina Salter MD;  Location: [facility] ENDOSCOPY;  Service:    • BRONCHOSCOPY N/A 1/30/2018    Procedure: BRONCHOSCOPY with BAL and washing;  Surgeon: Shreyas Wild MD;  Location: [facility] ENDOSCOPY;  Service:    • BRONCHOSCOPY Bilateral 4/24/2018    Procedure: BRONCHOSCOPY WITH WASHING;  Surgeon: Katharina Salter MD;  Location: [facility] ENDOSCOPY;  Service: Pulmonary   • BRONCHOSCOPY N/A 12/28/2019    Procedure: BRONCHOSCOPY with bilateral washing;  Surgeon: Katharina Salter MD;  Location: [facility] ENDOSCOPY;  Service: Pulmonary   • COLONOSCOPY  05/17/2013    eh, ih, tort, sig tics   • COLONOSCOPY N/A 5/10/2019    Non-thrombosed external hemorrhoids found on perianal exam, Diverticulosis, Tortuous colon, One 5 mm polyp in the mid ascending colon, IH. Path:  Tubular adenoma.    • ENDOSCOPY  03/19/2015    z line irreg, hh   • HIP OPEN REDUCTION Right 1/17/2018    Procedure: HIP OPEN REDUCTION INTERNAL FIXATION WITH DYNAMIC HIP SCREW;  Surgeon: Les Black MD;  Location: [facility] OR;  Service:    • SPINE SURGERY     • TONSILLECTOMY     • TOTAL HIP ARTHROPLASTY REVISION Right 9/23/2019    Procedure: HIP  REVISION RIGHT;  Surgeon: Isauor Warner MD;  Location: [facility] OR;  Service: Orthopedics      General Information     Row Name 08/05/22 1651          Physical Therapy Time and Intention    Document Type therapy note (daily note)  -     Mode of Treatment individual therapy;physical therapy  -     Row Name 08/05/22 1651          General Information    Patient Profile Reviewed yes  -     Existing Precautions/Restrictions fall;spinal;LSO;brace worn when out of bed  -     Row Name 08/05/22 1651          Living Environment    People in Home alone  -     Row Name 08/05/22 1651          Cognition    Orientation Status (Cognition) oriented x 3  -     Row Name 08/05/22 1651          Safety Issues, Functional Mobility    Safety Issues Affecting Function (Mobility) awareness of need for assistance;impulsivity;insight into deficits/self-awareness;judgment;positioning of assistive device;problem-solving;safety precaution awareness  -     Impairments Affecting Function (Mobility) balance;coordination;endurance/activity tolerance;postural/trunk control;strength;pain  -           User Key  (r) = Recorded By, (t) = Taken By, (c) = Cosigned By    Initials Name Provider Type     Caren Lane PTA Physical Therapist Assistant               Mobility     Row Name 08/05/22 1653          Bed Mobility    Supine-Sit [place] (Bed Mobility) minimum assist (75% patient effort);moderate assist (50% patient effort);verbal cues  -     Sit-Supine [place] (Bed Mobility) not tested  -     Assistive Device (Bed Mobility) bed rails;draw sheet  -     Row Name  08/05/22 1653          Sit-Stand Transfer    Sit-Stand [place] (Transfers) 2 person assist;minimum assist (75% patient effort);verbal cues;nonverbal cues (demo/gesture)  -     Assistive Device (Sit-Stand Transfers) walker, front-wheeled  -     Comment, (Sit-Stand Transfer) required 2 attempts , post lean, impulsive, unsteady  -     Row Name 08/05/22 1653          Gait/Stairs (Locomotion)    [place] Level (Gait) 2 person assist;1 person to manage equipment;contact guard;verbal cues;nonverbal cues (demo/gesture)  -     Assistive Device (Gait) walker, front-wheeled  -     Distance in Feet (Gait) 30ft, 15ft, then 20ft after BR seated visit  -     Deviations/Abnormal Patterns (Gait) norm decreased;festinating/shuffling;stride length decreased  -     Bilateral Gait Deviations forward flexed posture  -     Comment, (Gait/Stairs) pt impulsive, wanted to use his 3-cline, but unsteady-educ on using rwx for now  -           User Key  (r) = Recorded By, (t) = Taken By, (c) = Cosigned By    Initials Name Provider Type    Caren Currie PTA Physical Therapist Assistant               Obj/Interventions     Row Name 08/05/22 1657          Motor Skills    Therapeutic Exercise --  LAQs EOB prior to standing x10 reps  -           User Key  (r) = Recorded By, (t) = Taken By, (c) = Cosigned By    Initials Name Provider Type    Caren Currie PTA Physical Therapist Assistant               Goals/Plan    No documentation.                Clinical Impression     Row Name 08/05/22 1658          Pain    Pretreatment Pain Rating 5/10  -     Posttreatment Pain Rating 7/10  -     Pain Location - back  -     Pain Intervention(s) Medication (See MAR);Repositioned;Ambulation/increased activity;Rest  -     Row Name 08/05/22 1658          Plan of Care Review    Plan of Care Reviewed With patient  -     Progress improving  -     Outcome Evaluation Pt agreed to PT session, initially c/o pain and  "fatigue, just wanted to rest but knows he\" needs the walk\"; pt agreed to incr amb dist if he can stop in BR also, pt has forw flexed posture and has been that way \"for some time\"; he is aware of log rolling OOB, but required assist to perf correctly due to wkness and pain, assist of 2 for STS, but could be assist of 1 for amb, just impulsive at times and unsteady  -     Row Name 08/05/22 1658          Therapy Assessment/Plan (PT)    Rehab Potential (PT) good, to achieve stated therapy goals  -     Row Name 08/05/22 1658          Vital Signs    O2 Delivery Pre Treatment room air  -     Row Name 08/05/22 1658          Positioning and Restraints    Pre-Treatment Position in bed  -     Post Treatment Position chair  -     In Chair reclined;call light within reach;encouraged to call for assist;exit alarm on;notified nsg  -           User Key  (r) = Recorded By, (t) = Taken By, (c) = Cosigned By    Initials Name Provider Type    Caren Currie PTA Physical Therapist Assistant               Outcome Measures     Row Name 08/05/22 1703          How much help from another person do you currently need...    Turning from your back to your side while in flat bed without using bedrails? 3  -JM     Moving from lying on back to sitting on the side of a flat bed without bedrails? 2  -JM     Moving to and from a bed to a chair (including a wheelchair)? 3  -JM     Standing up from a chair using your arms (e.g., wheelchair, bedside chair)? 3  -JM     Climbing 3-5 steps with a railing? 1  -JM     To walk in hospital room? 3  -JM     AM-PAC 6 Clicks Score (PT) 15  -JM     Highest level of mobility 4 --> Transferred to chair/commode  -           User Key  (r) = Recorded By, (t) = Taken By, (c) = Cosigned By    Initials Name Provider Type    Caren Currie PTA Physical Therapist Assistant                             Physical Therapy Education                 Title: PT OT SLP Therapies (Done)     Topic: Physical " "Therapy (Done)     Point: Mobility training (Done)     Learning Progress Summary           Patient Acceptance, E,TB,D, VU,NR by  at 8/5/2022 1704    Acceptance, E,TB, VU,NR by CB at 8/4/2022 1541    Acceptance, E,TB, VU,NR by CB at 8/3/2022 0903                   Point: Home exercise program (Done)     Learning Progress Summary           Patient Acceptance, E,TB,D, VU,NR by  at 8/5/2022 1704    Acceptance, E,TB, VU,NR by CB at 8/3/2022 0903                   Point: Body mechanics (Done)     Learning Progress Summary           Patient Acceptance, E,TB,D, VU,NR by  at 8/5/2022 1704    Acceptance, E,TB, VU,NR by CB at 8/4/2022 1541    Acceptance, E,TB, VU,NR by CB at 8/3/2022 0903                   Point: Precautions (Done)     Learning Progress Summary           Patient Acceptance, E,TB,D, VU,NR by  at 8/5/2022 1704    Acceptance, E,TB, VU,NR by CB at 8/4/2022 1541    Acceptance, E,TB, VU,NR by CB at 8/3/2022 0903                               User Key     Initials Effective Dates Name Provider Type Discipline     03/07/18 -  Caren Lane PTA Physical Therapist Assistant PT     10/22/21 -  Amparo Wilson PT Physical Therapist PT              PT Recommendation and Plan     Plan of Care Reviewed With: patient  Progress: improving  Outcome Evaluation: Pt agreed to PT session, initially c/o pain and fatigue, just wanted to rest but knows he\" needs the walk\"; pt agreed to incr amb dist if he can stop in BR also, pt has forw flexed posture and has been that way \"for some time\"; he is aware of log rolling OOB, but required assist to perf correctly due to wkness and pain, assist of 2 for STS, but could be assist of 1 for amb, just impulsive at times and unsteady     Time Calculation:    PT Charges     Row Name 08/05/22 6823             Time Calculation    Start Time 0912  -      Stop Time 0939  -      Time Calculation (min) 27 min  -      PT Received On 08/05/22  -      PT - Next Appointment 08/08/22 "  -TOBIAS            User Key  (r) = Recorded By, (t) = Taken By, (c) = Cosigned By    Initials Name Provider Type    Caren Currie PTA Physical Therapist Assistant              Therapy Charges for Today     Code Description Service Date Service Provider Modifiers Qty    17360749079 HC PT THER PROC EA 15 MIN 8/5/2022 Caren Lane PTA GP 2    03172705619 HC PT THER SUPP EA 15 MIN 8/5/2022 Caren Lane PTA GP 2          PT G-Codes  Outcome Measure Options: AM-PAC 6 Clicks Daily Activity (OT)  AM-PAC 6 Clicks Score (PT): 15  AM-PAC 6 Clicks Score (OT): 16    Caren Lane PTA  8/5/2022

## 2022-08-05 NOTE — PLAN OF CARE
"Goal Outcome Evaluation:  Plan of Care Reviewed With: patient        Progress: improving  Outcome Evaluation: Pt agreed to PT session, initially c/o pain and fatigue, just wanted to rest but knows he\" needs the walk\"; pt agreed to incr amb dist if he can stop in BR also, pt has forw flexed posture and has been that way \"for some time\"; he is aware of log rolling OOB, but required assist to perf correctly due to wkness and pain, assist of 2 for STS, but could be assist of 1 for amb, just impulsive at times and unsteady    Patient was intermittently wearing a face mask during this therapy encounter. Therapist used appropriate personal protective equipment including eye protection, mask, and gloves.  Mask used was standard procedure mask. Appropriate PPE was worn during the entire therapy session. Hand hygiene was completed before and after therapy session. Patient is not in enhanced droplet precautions.    Mili Knight, PT tech present  "

## 2022-08-06 VITALS
SYSTOLIC BLOOD PRESSURE: 137 MMHG | TEMPERATURE: 97.7 F | RESPIRATION RATE: 18 BRPM | OXYGEN SATURATION: 95 % | DIASTOLIC BLOOD PRESSURE: 77 MMHG | HEIGHT: 71 IN | WEIGHT: 180.12 LBS | BODY MASS INDEX: 25.22 KG/M2 | HEART RATE: 90 BPM

## 2022-08-06 PROCEDURE — 94799 UNLISTED PULMONARY SVC/PX: CPT

## 2022-08-06 PROCEDURE — 94761 N-INVAS EAR/PLS OXIMETRY MLT: CPT

## 2022-08-06 PROCEDURE — 94664 DEMO&/EVAL PT USE INHALER: CPT

## 2022-08-06 RX ORDER — METHYLPHENIDATE HYDROCHLORIDE 10 MG/1
10 TABLET ORAL DAILY
Qty: 3 TABLET | Refills: 0 | Status: SHIPPED | OUTPATIENT
Start: 2022-08-06 | End: 2022-08-19 | Stop reason: SDUPTHER

## 2022-08-06 RX ORDER — GABAPENTIN 300 MG/1
300 CAPSULE ORAL 3 TIMES DAILY
Qty: 9 CAPSULE | Refills: 0 | Status: SHIPPED | OUTPATIENT
Start: 2022-08-06 | End: 2022-11-10 | Stop reason: SDUPTHER

## 2022-08-06 RX ORDER — HYDROCODONE BITARTRATE AND ACETAMINOPHEN 7.5; 325 MG/1; MG/1
1 TABLET ORAL EVERY 6 HOURS PRN
Qty: 12 TABLET | Refills: 0 | Status: SHIPPED | OUTPATIENT
Start: 2022-08-06 | End: 2022-09-13

## 2022-08-06 RX ADMIN — MULTIPLE VITAMINS W/ MINERALS TAB 1 TABLET: TAB at 09:19

## 2022-08-06 RX ADMIN — FLUOXETINE HYDROCHLORIDE 20 MG: 20 CAPSULE ORAL at 09:19

## 2022-08-06 RX ADMIN — POLYETHYLENE GLYCOL 3350 17 G: 17 POWDER, FOR SOLUTION ORAL at 09:19

## 2022-08-06 RX ADMIN — BUDESONIDE 0.5 MG: 0.5 INHALANT ORAL at 07:42

## 2022-08-06 RX ADMIN — Medication 500 MCG: at 09:19

## 2022-08-06 RX ADMIN — HYDROCODONE BITARTRATE AND ACETAMINOPHEN 1 TABLET: 7.5; 325 TABLET ORAL at 09:19

## 2022-08-06 RX ADMIN — METHYLPHENIDATE HYDROCHLORIDE 10 MG: 10 TABLET ORAL at 09:18

## 2022-08-06 RX ADMIN — GABAPENTIN 300 MG: 300 CAPSULE ORAL at 09:19

## 2022-08-06 RX ADMIN — IPRATROPIUM BROMIDE AND ALBUTEROL SULFATE 3 ML: .5; 3 SOLUTION RESPIRATORY (INHALATION) at 07:41

## 2022-08-06 RX ADMIN — MIRABEGRON 50 MG: 25 TABLET, FILM COATED, EXTENDED RELEASE ORAL at 09:19

## 2022-08-06 RX ADMIN — FUROSEMIDE 40 MG: 40 TABLET ORAL at 09:19

## 2022-08-06 RX ADMIN — PANTOPRAZOLE SODIUM 40 MG: 40 TABLET, DELAYED RELEASE ORAL at 09:19

## 2022-08-06 RX ADMIN — POTASSIUM CHLORIDE 20 MEQ: 10 TABLET, EXTENDED RELEASE ORAL at 09:19

## 2022-08-06 NOTE — DISCHARGE SUMMARY
Date of Admission: 8/1/2022  Date of Discharge:  8/6/2022  Primary Care Physician: Erich Aviles MD     Discharge Diagnosis:  Active Hospital Problems    Diagnosis  POA   • **Spinal stenosis, lumbar region with neurogenic claudication [M48.062]  Yes   • Compression deformity of vertebra [M43.9]  Unknown   • Chronic diastolic CHF (congestive heart failure) ([facility]) [I50.32]  Yes   • COPD (chronic obstructive pulmonary disease) ([facility]) [J44.9]  Yes   • Benign non-nodular prostatic hyperplasia without lower urinary tract symptoms [N40.0]  Yes      Resolved Hospital Problems   No resolved problems to display.       DETAILS OF HOSPITAL STAY     Pertinent Test Results and Procedures Performed    XR Spine Lumbar AP & Lateral [[id number]] Collected: 08/04/22 1420        Updated: 08/04/22 1426     Narrative:       XR HIP WITH OR WITHOUT PELVIS 2-3 VIEWS RIGHT-, XR SPINE LUMBAR AP AND  LATERAL-     CLINICAL INFORMATION:  Compression fracture, radiculopathy, hip pain.     COMPARISON: AP pelvis right hip 05/24/2022.     HIP/PELVIS FINDINGS: Total right hip replacement prosthesis remains  stable, no component loosening or dislocation seen. No acute osseous  abnormality of the right femur or hemipelvis. Healed right pubic rami  fractures. Surrounding soft tissues have a satisfactory appearance.     CONCLUSION: Right total hip replacement prosthesis is satisfactory in  appearance, no acute osseous abnormality.     LUMBAR SPINE FINDINGS: The lower thoracic and lumbar spine both obscured  due to barium within the stomach and small bowel. L1 compression  deformity similar to 02/23/2022 CT. There is multilevel disc  degeneration involving the lower thoracic as well as the lumbar spine.  There is mid to lower lumbar facet hypertrophy seen. No acute  compression deformity identified. There is thoracolumbar scoliosis  convexity towards the left.     CONCLUSION: No acute compression deformity, old L1 compression deformity  similar to  02/23/2022. There is substantial degenerative change as  described above. Limited evaluation of the lumbar spine, superimposed  barium within the stomach and small bowel.     This report was finalized on 8/4/2022 2:23 PM by Dr. Richard Solitario M.D.        XR Hip With or Without Pelvis 2 - 3 View Right [[id number]] Collected: 08/04/22 1420       Updated: 08/04/22 1426     Narrative:       XR HIP WITH OR WITHOUT PELVIS 2-3 VIEWS RIGHT-, XR SPINE LUMBAR AP AND  LATERAL-     CLINICAL INFORMATION:  Compression fracture, radiculopathy, hip pain.     COMPARISON: AP pelvis right hip 05/24/2022.     HIP/PELVIS FINDINGS: Total right hip replacement prosthesis remains  stable, no component loosening or dislocation seen. No acute osseous  abnormality of the right femur or hemipelvis. Healed right pubic rami  fractures. Surrounding soft tissues have a satisfactory appearance.     CONCLUSION: Right total hip replacement prosthesis is satisfactory in  appearance, no acute osseous abnormality.     LUMBAR SPINE FINDINGS: The lower thoracic and lumbar spine both obscured  due to barium within the stomach and small bowel. L1 compression  deformity similar to 02/23/2022 CT. There is multilevel disc  degeneration involving the lower thoracic as well as the lumbar spine.  There is mid to lower lumbar facet hypertrophy seen. No acute  compression deformity identified. There is thoracolumbar scoliosis  convexity towards the left.     CONCLUSION: No acute compression deformity, old L1 compression deformity  similar to 02/23/2022. There is substantial degenerative change as  described above. Limited evaluation of the lumbar spine, superimposed  barium within the stomach and small bowel.     This report was finalized on 8/4/2022 2:23 PM by Dr. Richard Solitario M.D.        [place] [facility] [[id number]] Resulted: 08/02/22 1328       Updated: 08/02/22 1328     Narrative:       This procedure was auto-finalized with no dictation  required.         Hospital Course  Mr. Salomon is a 82 year old male with a past medical history of chronic diastolic heart failure, COPD, BPH who presented to the hospital with complaints of worsening radicular back pain. He has a history of chronic back pain followed previously by pain management. He was seen most recently by Dr. Grodon on 7/22/22 where MRI was ordered, but had worsening pain with right radicular symptoms prompting him to come to the ED. MRI from 7/26 showed chronic compression of L1, acute compression deformity of L3 with severe lumbar spondylosis. Canal stenosis was noted to be worse at L4-5. Ortho spine was consulted.  He was deemed to be a poor candidate for a kyphoplasty due to distorted anatomy.  He underwent a lumbar epidural injection on 8/2 which unfortunately did not improve his symptoms to an appreciable extent.   Orthospine surgery has recommended additional epidural steroid injections in the outpatient setting and subacute rehab.  He will transfer there today in stable condition.    Physical Exam at Discharge:  General: No acute distress, AAOx3  HEENT: EOMI, PERRL  Cardiovascular: +s1 and s2, RRR  Lungs: No rhonchi or wheezing  Abdomen: soft, nontender    Consults:   Consults     Date and Time Order Name Status Description    8/1/2022  9:33 PM Inpatient Orthopedic Surgery Consult Completed     8/1/2022  3:08 PM [facility] (on-call MD unless specified) Details      8/1/2022  1:11 PM Ortho (on-call MD unless specified)              Condition on Discharge: Stable    Discharge Disposition  Skilled Nursing Facility (DC - External)    Discharge Medications     Discharge Medications      New Medications      Instructions Start Date   HYDROcodone-acetaminophen 7.5-325 MG per tablet  Commonly known as: NORCO   1 tablet, Oral, Every 6 Hours PRN         Changes to Medications      Instructions Start Date   albuterol 1.25 MG/3ML nebulizer solution  Commonly known as: ACCUNEB  What changed: Another  medication with the same name was changed. Make sure you understand how and when to take each.   1 ampule, Inhalation      albuterol (2.5 MG/3ML) 0.083% nebulizer solution  Commonly known as: PROVENTIL  What changed: when to take this   2.5 mg, Nebulization, 4 Times Daily - RT      gabapentin 300 MG capsule  Commonly known as: NEURONTIN  What changed:   · how much to take  · when to take this   300 mg, Oral, 3 Times Daily         Continue These Medications      Instructions Start Date   Anoro Ellipta 62.5-25 MCG/INH aerosol powder  inhaler  Generic drug: umeclidinium-vilanterol   USE 1 INHALATION DAILY      budesonide 0.5 MG/2ML nebulizer solution  Commonly known as: PULMICORT   0.5 mg, Nebulization, 2 Times Daily, 1 vial only twice daily      calcium carbonate 500 MG chewable tablet  Commonly known as: TUMS   1 tablet, Oral, As Needed      colestipol 5 g granules  Commonly known as: COLESTID   5 g, Oral, 2 Times Daily, Prn after diarrhea      FLUoxetine 20 MG capsule  Commonly known as: PROzac   TAKE 1 CAPSULE DAILY      furosemide 40 MG tablet  Commonly known as: LASIX   40 mg, Oral      ketoconazole 2 % shampoo  Commonly known as: NIZORAL   APPLY TOPICALLY TO THE APPROPRIATE AREA AS DIRECTED TWO TIMES A WEEK      methylphenidate 10 MG tablet  Commonly known as: Ritalin   10 mg, Oral, Daily, For ADD 3 months      multivitamin with minerals tablet tablet   1 tablet, Oral, Daily      Myrbetriq 25 MG tablet sustained-release 24 hour 24 hr tablet  Generic drug: Mirabegron ER   No dose, route, or frequency recorded.      naproxen 500 MG tablet  Commonly known as: Naprosyn   500 mg, Oral, 2 Times Daily With Meals      OXYGEN-HELIUM IN   2.5 L, Inhalation, Daily PRN      pantoprazole 40 MG EC tablet  Commonly known as: PROTONIX   40 mg, Oral, Daily      polyethylene glycol 17 g packet  Commonly known as: MIRALAX   DISSOLVE ONE PACKET IN 8OZ LIQUID & DRINK BY MOUTH DAILY      potassium chloride 20 MEQ CR tablet  Commonly  known as: K-DUR,KLOR-CON   1 tablet, Oral, Daily      Rollator Ultra-Light misc   1 application, Does not apply, Daily, Severe DJD      rOPINIRole 0.5 MG tablet  Commonly known as: REQUIP   TAKE 1 TO 2 TABLETS EVERY NIGHT 1 HOUR BEFORE BEDTIME      sodium chloride 7 % nebulizer solution nebulizer solution   No dose, route, or frequency recorded.      terbinafine 250 MG tablet  Commonly known as: lamiSIL   No dose, route, or frequency recorded.      TiZANidine 6 MG capsule  Commonly known as: ZANAFLEX   TAKE 1 CAPSULE EVERY NIGHT      Turmeric 500 MG capsule   Oral, Daily      vitamin B-12 2500 MCG sublingual tablet tablet  Commonly known as: CYANOCOBALAMIN   2,500 mcg, Oral, Every Evening         Stop These Medications    oxyCODONE-acetaminophen 7.5-325 MG per tablet  Commonly known as: PERCOCET            Discharge Diet: As tolerated    Activity at Discharge: Regular as tolerated    Follow-up Appointments  Future Appointments   Date Time Provider Department Center   9/1/2022 10:30 AM Erich Aviles MD [facility]   9/13/2022  2:20 PM Anum Bhatt MD [facility]     Additional Instructions for the Follow-ups that You Need to Schedule     Discharge Follow-up with Specialty: Orthopedic spine; 3 Weeks   As directed      Specialty: Orthopedic spine    Follow Up: 3 Weeks    Follow Up Details: Return to the office to see Dr. Andrzej Gordon         Discharge Follow-up with Specified Provider: Orthopedics; 2 Weeks   As directed      To: Orthopedics    Follow Up: 2 Weeks    Follow Up Details: Return to the office to see Dr. Isauro Warner for Right hip               I have examined and discussed discharge planning with the patient today.    I wore full protective equipment throughout the patient encounter including eye protection and facemask.  Hand hygiene was performed before donning protective equipment and after removal when leaving the room.     Erich Corona MD  08/06/22  09:19 EDT    Time:  Discharge greater than 30 min

## 2022-08-09 NOTE — CASE MANAGEMENT/SOCIAL WORK
Case Management Discharge Note      Final Note: [facility] skilled bed via michael W/C [place]         Selected Continued Care - Discharged on 8/6/2022 Admission date: 8/1/2022 - Discharge disposition: Skilled Nursing Facility (DC - External)    Destination Coordination complete.    Service Provider Selected Services Address Phone Fax Patient Preferred    [facility]  Skilled Nursing [address] [address] [phone] --          Durable Medical Equipment    No services have been selected for the patient.              Dialysis/Infusion    No services have been selected for the patient.              Home Medical Care    No services have been selected for the patient.              Therapy    No services have been selected for the patient.              Community Resources    No services have been selected for the patient.              Community & DME    No services have been selected for the patient.                Selected Continued Care - Episodes Includes selections from active Coordinated Care Management episodes    High Risk Care Management Episode start date: 8/8/2022   There are no active outsourced providers for this episode.               Transportation Services  W/C Van: [facility]    Final Discharge Disposition Code: 03 - skilled nursing facility (SNF)

## 2022-08-10 NOTE — TELEPHONE ENCOUNTER
Called nurse [phone] ok per rls to clear saline, bicitraction   No Repair - Repaired With Adjacent Surgical Defect Text (Leave Blank If You Do Not Want): After the excision the defect was repaired concurrently with another surgical defect which was in close approximation.

## 2022-08-11 ENCOUNTER — PATIENT OUTREACH (OUTPATIENT)
Dept: CASE MANAGEMENT | Facility: OTHER | Age: 82
End: 2022-08-11

## 2022-08-11 NOTE — OUTREACH NOTE
AMBULATORY CASE MANAGEMENT NOTE    Name and Relationship of Patient/Support Person: [facility]  -     [facility] Follow-up    Questions/Answers    Flowsheet Row Responses   Acute Facility Discharged From [facility]   Name of the Skilled Nursing Facility? [facility]   Purpose of SNF Admission PT, OT, SN   Who is the insurance provider or payor of patient stay? Medicare   Progression of Patient? continues        Patient Outreach    Outreach call to [facility] and verified patient is admitted for SNF.  There is no estimated DC date at this time.  Outreach has been scheduled for follow up.    JUAN JOSE PHAM  Ambulatory Case Management    8/11/2022, 14:39 EDT

## 2022-08-17 ENCOUNTER — HOME HEALTH ADMISSION (OUTPATIENT)
Dept: HOME HEALTH SERVICES | Facility: HOME HEALTHCARE | Age: 82
End: 2022-08-17

## 2022-08-18 ENCOUNTER — PATIENT OUTREACH (OUTPATIENT)
Dept: CASE MANAGEMENT | Facility: OTHER | Age: 82
End: 2022-08-18

## 2022-08-18 NOTE — OUTREACH NOTE
AMBULATORY CASE MANAGEMENT NOTE    Name and Relationship of Patient/Support Person:  -   SNF Follow-up    Questions/Answers    Flowsheet Row Responses   Acute Facility Discharged From [facility]   Name of the Skilled Nursing Facility? [facility]   Purpose of SNF Admission PT, OT, SN   Who is the insurance provider or payor of patient stay? Medicare   Progression of Patient? continues        Patient Outreach    Outgoing call to [facility] skilled rehab.  Patient has no anticipated DC date.  Outreach scheduled for follow up.    JUAN JOSE PHAM  Ambulatory Case Management    8/18/2022, 13:52 EDT

## 2022-08-19 ENCOUNTER — TRANSITIONAL CARE MANAGEMENT TELEPHONE ENCOUNTER (OUTPATIENT)
Dept: CALL CENTER | Facility: HOSPITAL | Age: 82
End: 2022-08-19

## 2022-08-19 ENCOUNTER — TELEPHONE (OUTPATIENT)
Dept: PAIN MEDICINE | Facility: CLINIC | Age: 82
End: 2022-08-19

## 2022-08-19 ENCOUNTER — TELEPHONE (OUTPATIENT)
Dept: FAMILY MEDICINE CLINIC | Facility: CLINIC | Age: 82
End: 2022-08-19

## 2022-08-19 ENCOUNTER — READMISSION MANAGEMENT (OUTPATIENT)
Dept: CALL CENTER | Facility: HOSPITAL | Age: 82
End: 2022-08-19

## 2022-08-19 RX ORDER — METHYLPHENIDATE HYDROCHLORIDE 10 MG/1
10 TABLET ORAL DAILY
Qty: 30 TABLET | Refills: 0 | Status: CANCELLED | OUTPATIENT
Start: 2022-08-19

## 2022-08-19 RX ORDER — METHYLPHENIDATE HYDROCHLORIDE 10 MG/1
10 TABLET ORAL DAILY
Qty: 30 TABLET | Refills: 0 | Status: ON HOLD | OUTPATIENT
Start: 2022-08-19 | End: 2022-11-23 | Stop reason: SDUPTHER

## 2022-08-19 NOTE — OUTREACH NOTE
Call Center TCM Note    Flowsheet Row Responses   [facility] patient discharged from? Non-BH   Does the patient have one of the following disease processes/diagnoses(primary or secondary)? Other   TCM attempt successful? No   Unsuccessful attempts Attempt 1          Mera Fairchild RN    8/19/2022, 14:46 EDT

## 2022-08-19 NOTE — OUTREACH NOTE
Call Center TCM Note    Flowsheet Row Responses   [facility] [place] patient discharged from? Non-BH   Does the patient have one of the following disease processes/diagnoses(primary or secondary)? Other   TCM attempt successful? No   Unsuccessful attempts Attempt 2          Mera Fairchild RN    8/19/2022, 15:54 EDT

## 2022-08-19 NOTE — TELEPHONE ENCOUNTER
Caller: [facility] - VENKATESH    Relationship: [facility]    Best call back number: [phone]    What was the call regarding: VENKATESH FROM [facility] CALLING STATING THAT SHE IS BEGINNING PHYSICAL THERAPY SHE WILL BE FAXING ORDERS.    SHE WOULD ALSO LIKE TO KNOW IF HE IS SUPPOSED TO BE TAKING  methylphenidate (Ritalin) 10 MG tablet

## 2022-08-19 NOTE — OUTREACH NOTE
Prep Survey    Flowsheet Row Responses   [facility] patient discharged from? Non-BH   Is LACE score < 7 ? Non-BH Discharge   Emergency Room discharge w/ pulse ox? No   Eligibility [facility]    Date of Discharge 08/18/22   Discharge diagnosis Unavailable   Does the patient have one of the following disease processes/diagnoses(primary or secondary)? Other   Prep survey completed? Yes          ALICIA GUILLAUME - Registered Nurse

## 2022-08-22 ENCOUNTER — TRANSITIONAL CARE MANAGEMENT TELEPHONE ENCOUNTER (OUTPATIENT)
Dept: CALL CENTER | Facility: HOSPITAL | Age: 82
End: 2022-08-22

## 2022-08-22 NOTE — OUTREACH NOTE
Call Center TCM Note    Flowsheet Row Responses   [facility] facility patient discharged from? Non-BH   Does the patient have one of the following disease processes/diagnoses(primary or secondary)? Other   TCM attempt successful? No  [No verbal release]   Unsuccessful attempts Attempt 3          Tammie Hernandez RN    8/22/2022, 08:37 EDT

## 2022-08-23 ENCOUNTER — TELEPHONE (OUTPATIENT)
Dept: GASTROENTEROLOGY | Facility: CLINIC | Age: 82
End: 2022-08-23

## 2022-08-23 NOTE — TELEPHONE ENCOUNTER
Pt in office requesting refill of pantoprazole 40 mg daily.  It is noted that DR Aviles filled same on 7/27/22 with #90, R3 to [facility] with receipt confirmed.  Advise pt of this - he will check with Aide.

## 2022-08-29 RX ORDER — ROPINIROLE 0.5 MG/1
TABLET, FILM COATED ORAL
Qty: 90 TABLET | Refills: 7 | Status: ON HOLD | OUTPATIENT
Start: 2022-08-29 | End: 2022-11-19 | Stop reason: SDUPTHER

## 2022-08-30 ENCOUNTER — OUTSIDE FACILITY SERVICE (OUTPATIENT)
Dept: FAMILY MEDICINE CLINIC | Facility: CLINIC | Age: 82
End: 2022-08-30

## 2022-08-30 PROCEDURE — OUTSIDEPOS PR OUTSIDE POS PLACEHOLDER: Performed by: INTERNAL MEDICINE

## 2022-09-01 ENCOUNTER — PATIENT OUTREACH (OUTPATIENT)
Dept: CASE MANAGEMENT | Facility: OTHER | Age: 82
End: 2022-09-01

## 2022-09-01 NOTE — OUTREACH NOTE
AMBULATORY CASE MANAGEMENT NOTE    Name and Relationship of Patient/Support Person:  -     SNF Follow-up    Outgoing call to [facility] to follow up SNF progress.  Notified he is not a patient.  Unable to obtain DC date.  Outgoing call to the patient for follow up and left VM.  Outreach scheduled to follow up.    JUAN JOSE PHAM  Ambulatory Case Management    9/1/2022, 11:31 EDT

## 2022-09-12 NOTE — PROGRESS NOTES
The patient has a pain history of the following:  Chronic low back pain  Lumbar spondylosis   Lumbar disc disease   Scoliosis   Lumbar stenosis   Lumbar radiculopathy  T12 & L1 compression deformity      Previous interventions that the patient has received include:   L3-4 Epidural steroid injections 8/2/22 - no benefit   Possible other interventions      Pain medications include:  Gabapentin 100mg qhs   Requip  Tizanidine  Naproxen     Previously: Percocet 7.5-325mg BID     Other conservative modalities which the patient reports using include:  Physical Therapy: yes  Neck or back surgery: yes  Past pain management: yes     Past Significant Surgical History:  L2-4 microlumbar laminectomy 9/30/2020  Lumbar surgery 1985    HPI:     CHIEF COMPLAINT Back Pain  4 month follow up back pain    reports consistent pain over the last couple of months .     Subjective   Tony Leonard is a 82 y.o. male  who presents for follow-up.  He has a history of back pain s/p back surgery and sacral fracture.  At his last appointment with us he presented with drowsiness and hypotension in the office.  We recommended he have someone pick him up or leave via EMS and he left the clinic AMA without a ride.  We decided at that time to discontinue any controlled substance prescriptions and offer interventions only.     He has since gone to the hospital for worsening pain.  Orthopedic surgery evaluated him and recommended Epidural steroid injection and avoidance of kyphoplasty due to distorted anatomy.  He had an epidural at the hospital without benefit. He was discharged to acute rehab.     Mr. Leonard presents for follow-up today after being absent for the last 4 months.  Since his visit with us, he has suffered from an L3 vertebral fracture.  He was hospitalized due to severe pain.  Dr. Gordon evaluated him in the hospital and recommended conservative treatments with a back brace and avoiding kyphoplasty due to Mr. Leonard's  distorted anatomy.  For his pain he has been taking gabapentin, tizanidine and naproxen.  He asks today if I can resume his Percocet.  He states that his low blood pressure at his previous visit was due to his eating cinnamon on his yogurt.       PEG Assessment   What number best describes your pain on average in the past week?10  What number best describes how, during the past week, pain has interfered with your enjoyment of life?10  What number best describes how, during the past week, pain has interfered with your general activity?  10    REVIEW OF PERTINENT MEDICAL DATA  8/4/22 XR HIP WITH OR WITHOUT PELVIS 2-3 VIEWS RIGHT-, XR SPINE LUMBAR AP AND  LATERAL-     CLINICAL INFORMATION:  Compression fracture, radiculopathy, hip pain.     COMPARISON: AP pelvis right hip 05/24/2022.     HIP/PELVIS FINDINGS: Total right hip replacement prosthesis remains  stable, no component loosening or dislocation seen. No acute osseous  abnormality of the right femur or hemipelvis. Healed right pubic rami  fractures. Surrounding soft tissues have a satisfactory appearance.     CONCLUSION: Right total hip replacement prosthesis is satisfactory in  appearance, no acute osseous abnormality.     LUMBAR SPINE FINDINGS: The lower thoracic and lumbar spine both obscured  due to barium within the stomach and small bowel. L1 compression  deformity similar to 02/23/2022 CT. There is multilevel disc  degeneration involving the lower thoracic as well as the lumbar spine.  There is mid to lower lumbar facet hypertrophy seen. No acute  compression deformity identified. There is thoracolumbar scoliosis  convexity towards the left.     CONCLUSION: No acute compression deformity, old L1 compression deformity  similar to 02/23/2022. There is substantial degenerative change as  described above. Limited evaluation of the lumbar spine, superimposed  barium within the stomach and small bowel.     This report was finalized on 8/4/2022 2:23 PM by   "Richard Solitario M.D.    The following portions of the patient's history were reviewed and updated as appropriate: allergies, current medications, past family history, past medical history, past social history, past surgical history and problem list.    Review of Systems   Constitutional: Negative for activity change, fatigue and fever.   HENT: Negative for congestion.    Eyes: Negative for visual disturbance.   Respiratory: Negative for cough and chest tightness.    Cardiovascular: Negative for chest pain.   Gastrointestinal: Positive for abdominal pain. Negative for constipation and diarrhea.   Genitourinary: Negative for difficulty urinating and dysuria.   Musculoskeletal: Positive for back pain.   Neurological: Positive for weakness and numbness (fingertips/feet). Negative for dizziness, light-headedness and headaches.   Psychiatric/Behavioral: Positive for agitation. Negative for sleep disturbance and suicidal ideas. The patient is nervous/anxious.      I have reviewed and confirmed the accuracy of the ROS as documented by the MA/LPN/RN Anum Bhatt MD    Vitals:    09/13/22 1410   BP: 133/74   BP Location: Right arm   Patient Position: Sitting   Pulse: 81   Resp: 20   Temp: 98 °F (36.7 °C)   SpO2: 96%   Weight: 83.2 kg (183 lb 6.4 oz)   Height: 180.3 cm (71\")   PainSc:   5   PainLoc: Back         Objective   Physical Exam  Vitals reviewed.   Constitutional:       General: He is not in acute distress.  Pulmonary:      Effort: Pulmonary effort is normal. No respiratory distress.   Musculoskeletal:      Comments: Ambulation: With a rolling walker  Lumbar Exam:  Appearance: Scoliotic curve present and scarring present  Negative pain to percussion of the lumbar spinous processes.  Straight leg raise is positive radiculopathy, Bilateral.    Skin:     General: Skin is warm and dry.   Neurological:      Mental Status: He is alert.   Psychiatric:         Mood and Affect: Mood normal.         Thought Content: Thought " content normal.             Assessment & Plan   Diagnoses and all orders for this visit:    1. Lumbar radiculopathy (Primary)  -     Case Request    2. History of back surgery  -     Case Request    3. Scoliosis of lumbar spine, unspecified scoliosis type    4. Closed fracture of third lumbar vertebra with routine healing, unspecified fracture morphology, subsequent encounter          - Will schedule for bilateral L5-S1 Transforaminal epidural steroid injection. Risks discussed including but not limited to bleeding, bruising, infection, damage to surrounding structures, headache, and rare things such as being paralyzed, seizure, stroke, heart attack and death.  The risk of steroid medications include but are not limited to immunosuppression, which can increase the risk of shelbi an infectious disease as well as decrease the immune response to a vaccine.  He will need a  for this procedure.  - Explained that although I do believe he is truly in pain, I do not feel comfortable prescribing him opioids after his low blood pressure and sleepiness at his previous visit.  He understands.  - Tony Leonard reports a pain score of 5.  Given his pain assessment as noted, treatment options were discussed and the following options were decided upon as a follow-up plan to address the patient's pain: steroid injections.      --- Follow-up for bilateral L5-S1 Transforaminal epidural steroid injection and office visit 4-6 weeks after.          While examining this patient, I wore protective equipment including a mask, eye shield and gloves.  I washed my hands before and after this patient encounter.  The patient wore a mask throughout the visit as well.     Anum Bhatt MD  Pain Management

## 2022-09-13 ENCOUNTER — PREP FOR SURGERY (OUTPATIENT)
Dept: SURGERY | Facility: SURGERY CENTER | Age: 82
End: 2022-09-13

## 2022-09-13 ENCOUNTER — PATIENT OUTREACH (OUTPATIENT)
Dept: CASE MANAGEMENT | Facility: OTHER | Age: 82
End: 2022-09-13

## 2022-09-13 ENCOUNTER — OFFICE VISIT (OUTPATIENT)
Dept: PAIN MEDICINE | Facility: CLINIC | Age: 82
End: 2022-09-13

## 2022-09-13 VITALS
TEMPERATURE: 98 F | OXYGEN SATURATION: 96 % | HEART RATE: 81 BPM | BODY MASS INDEX: 25.68 KG/M2 | DIASTOLIC BLOOD PRESSURE: 74 MMHG | WEIGHT: 183.4 LBS | RESPIRATION RATE: 20 BRPM | SYSTOLIC BLOOD PRESSURE: 133 MMHG | HEIGHT: 71 IN

## 2022-09-13 DIAGNOSIS — Z98.890 HISTORY OF BACK SURGERY: ICD-10-CM

## 2022-09-13 DIAGNOSIS — M54.16 LUMBAR RADICULOPATHY: Primary | ICD-10-CM

## 2022-09-13 DIAGNOSIS — S32.039D CLOSED FRACTURE OF THIRD LUMBAR VERTEBRA WITH ROUTINE HEALING, UNSPECIFIED FRACTURE MORPHOLOGY, SUBSEQUENT ENCOUNTER: ICD-10-CM

## 2022-09-13 DIAGNOSIS — M41.9 SCOLIOSIS OF LUMBAR SPINE, UNSPECIFIED SCOLIOSIS TYPE: ICD-10-CM

## 2022-09-13 PROCEDURE — 99214 OFFICE O/P EST MOD 30 MIN: CPT | Performed by: ANESTHESIOLOGY

## 2022-09-13 RX ORDER — NAPROXEN SODIUM 220 MG
220 TABLET ORAL 2 TIMES DAILY PRN
COMMUNITY
End: 2022-09-13

## 2022-09-13 RX ORDER — SODIUM CHLORIDE 0.9 % (FLUSH) 0.9 %
10 SYRINGE (ML) INJECTION AS NEEDED
Status: CANCELLED | OUTPATIENT
Start: 2022-09-13

## 2022-09-13 RX ORDER — SODIUM CHLORIDE 0.9 % (FLUSH) 0.9 %
10 SYRINGE (ML) INJECTION EVERY 12 HOURS SCHEDULED
Status: CANCELLED | OUTPATIENT
Start: 2022-09-13

## 2022-09-13 RX ORDER — CAMPHOR, MENTHOL, METHYL SALICYLATE 21.56; 41.73; 69.55 MG/1; MG/1; MG/1
3 PATCH PERCUTANEOUS; TOPICAL; TRANSDERMAL EVERY 12 HOURS
COMMUNITY
End: 2022-12-12 | Stop reason: HOSPADM

## 2022-09-13 RX ORDER — ACETAMINOPHEN 500 MG
500 TABLET ORAL EVERY 6 HOURS PRN
COMMUNITY

## 2022-09-13 NOTE — OUTREACH NOTE
AMBULATORY CASE MANAGEMENT NOTE    Name and Relationship of Patient/Support Person: HoracioRicardo isaac - Emergency Contact    Patient Outreach    Outgoing VM x 3 to the patient and x 1 to his son Ricardo Leonard .  Have been unable to reach and declined from program.    JUAN JOSE PHAM  Ambulatory Case Management    9/13/2022, 10:59 EDT

## 2022-09-23 ENCOUNTER — INPATIENT HOSPITAL (OUTPATIENT)
Dept: URBAN - METROPOLITAN AREA HOSPITAL 113 | Facility: HOSPITAL | Age: 82
End: 2022-09-23

## 2022-09-23 ENCOUNTER — HOSPITAL ENCOUNTER (OUTPATIENT)
Facility: HOSPITAL | Age: 82
Discharge: HOME | End: 2022-09-24
Attending: EMERGENCY MEDICINE | Admitting: INTERNAL MEDICINE

## 2022-09-23 DIAGNOSIS — K92.2 GASTROINTESTINAL HEMORRHAGE, UNSPECIFIED: ICD-10-CM

## 2022-09-23 DIAGNOSIS — K62.5 RECTAL BLEEDING: Primary | ICD-10-CM

## 2022-09-23 DIAGNOSIS — K62.5 RECTAL BLEED: ICD-10-CM

## 2022-09-23 LAB
ABO GROUP BLD: NORMAL
ALBUMIN SERPL-MCNC: 3.8 G/DL (ref 3.5–5.2)
ALBUMIN/GLOB SERPL: 2.2 G/DL
ALP SERPL-CCNC: 72 U/L (ref 39–117)
ALT SERPL W P-5'-P-CCNC: 14 U/L (ref 1–41)
ANION GAP SERPL CALCULATED.3IONS-SCNC: 7.1 MMOL/L (ref 5–15)
AST SERPL-CCNC: 16 U/L (ref 1–40)
BASOPHILS # BLD AUTO: 0.08 10*3/MM3 (ref 0–0.2)
BASOPHILS NFR BLD AUTO: 1.2 % (ref 0–1.5)
BILIRUB SERPL-MCNC: 0.3 MG/DL (ref 0–1.2)
BLD GP AB SCN SERPL QL: NEGATIVE
BUN SERPL-MCNC: 28 MG/DL (ref 8–23)
BUN/CREAT SERPL: 32.6 (ref 7–25)
CALCIUM SPEC-SCNC: 8.5 MG/DL (ref 8.6–10.5)
CHLORIDE SERPL-SCNC: 107 MMOL/L (ref 98–107)
CO2 SERPL-SCNC: 24.9 MMOL/L (ref 22–29)
CREAT SERPL-MCNC: 0.86 MG/DL (ref 0.76–1.27)
D-LACTATE SERPL-SCNC: 1.1 MMOL/L (ref 0.5–2)
DEPRECATED RDW RBC AUTO: 43.3 FL (ref 37–54)
EGFRCR SERPLBLD CKD-EPI 2021: 85.4 ML/MIN/1.73
EOSINOPHIL # BLD AUTO: 1.59 10*3/MM3 (ref 0–0.4)
EOSINOPHIL NFR BLD AUTO: 24.2 % (ref 0.3–6.2)
ERYTHROCYTE [DISTWIDTH] IN BLOOD BY AUTOMATED COUNT: 12.6 % (ref 12.3–15.4)
GLOBULIN UR ELPH-MCNC: 1.7 GM/DL
GLUCOSE SERPL-MCNC: 107 MG/DL (ref 65–99)
HCT VFR BLD AUTO: 34.4 % (ref 37.5–51)
HGB BLD-MCNC: 11.7 G/DL (ref 13–17.7)
IMM GRANULOCYTES # BLD AUTO: 0.02 10*3/MM3 (ref 0–0.05)
IMM GRANULOCYTES NFR BLD AUTO: 0.3 % (ref 0–0.5)
INR PPP: 1.06 (ref 0.9–1.1)
LYMPHOCYTES # BLD AUTO: 1.62 10*3/MM3 (ref 0.7–3.1)
LYMPHOCYTES NFR BLD AUTO: 24.6 % (ref 19.6–45.3)
MCH RBC QN AUTO: 31.9 PG (ref 26.6–33)
MCHC RBC AUTO-ENTMCNC: 34 G/DL (ref 31.5–35.7)
MCV RBC AUTO: 93.7 FL (ref 79–97)
MONOCYTES # BLD AUTO: 0.6 10*3/MM3 (ref 0.1–0.9)
MONOCYTES NFR BLD AUTO: 9.1 % (ref 5–12)
NEUTROPHILS NFR BLD AUTO: 2.67 10*3/MM3 (ref 1.7–7)
NEUTROPHILS NFR BLD AUTO: 40.6 % (ref 42.7–76)
NRBC BLD AUTO-RTO: 0 /100 WBC (ref 0–0.2)
PLATELET # BLD AUTO: 185 10*3/MM3 (ref 140–450)
PMV BLD AUTO: 9.1 FL (ref 6–12)
POTASSIUM SERPL-SCNC: 4.1 MMOL/L (ref 3.5–5.2)
PROT SERPL-MCNC: 5.5 G/DL (ref 6–8.5)
PROTHROMBIN TIME: 13.7 SECONDS (ref 11.7–14.2)
RBC # BLD AUTO: 3.67 10*6/MM3 (ref 4.14–5.8)
RH BLD: POSITIVE
SODIUM SERPL-SCNC: 139 MMOL/L (ref 136–145)
T&S EXPIRATION DATE: NORMAL
WBC NRBC COR # BLD: 6.58 10*3/MM3 (ref 3.4–10.8)

## 2022-09-23 PROCEDURE — 85025 COMPLETE CBC W/AUTO DIFF WBC: CPT | Performed by: EMERGENCY MEDICINE

## 2022-09-23 PROCEDURE — 99221 1ST HOSP IP/OBS SF/LOW 40: CPT | Performed by: INTERNAL MEDICINE

## 2022-09-23 PROCEDURE — 86850 RBC ANTIBODY SCREEN: CPT | Performed by: EMERGENCY MEDICINE

## 2022-09-23 PROCEDURE — 86900 BLOOD TYPING SEROLOGIC ABO: CPT | Performed by: EMERGENCY MEDICINE

## 2022-09-23 PROCEDURE — 96374 THER/PROPH/DIAG INJ IV PUSH: CPT

## 2022-09-23 PROCEDURE — G0378 HOSPITAL OBSERVATION PER HR: HCPCS

## 2022-09-23 PROCEDURE — 85610 PROTHROMBIN TIME: CPT | Performed by: EMERGENCY MEDICINE

## 2022-09-23 PROCEDURE — 99284 EMERGENCY DEPT VISIT MOD MDM: CPT

## 2022-09-23 PROCEDURE — 83605 ASSAY OF LACTIC ACID: CPT | Performed by: EMERGENCY MEDICINE

## 2022-09-23 PROCEDURE — 80053 COMPREHEN METABOLIC PANEL: CPT | Performed by: EMERGENCY MEDICINE

## 2022-09-23 PROCEDURE — 86901 BLOOD TYPING SEROLOGIC RH(D): CPT | Performed by: EMERGENCY MEDICINE

## 2022-09-23 RX ORDER — ACETAMINOPHEN 500 MG
500 TABLET ORAL EVERY 6 HOURS PRN
Status: DISCONTINUED | OUTPATIENT
Start: 2022-09-23 | End: 2022-09-24 | Stop reason: HOSPADM

## 2022-09-23 RX ORDER — SODIUM CHLORIDE 0.9 % (FLUSH) 0.9 %
10 SYRINGE (ML) INJECTION AS NEEDED
Status: DISCONTINUED | OUTPATIENT
Start: 2022-09-23 | End: 2022-09-24 | Stop reason: HOSPADM

## 2022-09-23 RX ORDER — ROPINIROLE 1 MG/1
1 TABLET, FILM COATED ORAL NIGHTLY
Status: DISCONTINUED | OUTPATIENT
Start: 2022-09-23 | End: 2022-09-24 | Stop reason: HOSPADM

## 2022-09-23 RX ORDER — TIZANIDINE 4 MG/1
6 TABLET ORAL NIGHTLY PRN
Refills: 3 | Status: DISCONTINUED | OUTPATIENT
Start: 2022-09-23 | End: 2022-09-24 | Stop reason: HOSPADM

## 2022-09-23 RX ORDER — MULTIPLE VITAMINS W/ MINERALS TAB 9MG-400MCG
1 TAB ORAL DAILY
Status: DISCONTINUED | OUTPATIENT
Start: 2022-09-23 | End: 2022-09-24 | Stop reason: HOSPADM

## 2022-09-23 RX ORDER — PANTOPRAZOLE SODIUM 40 MG/10ML
40 INJECTION, POWDER, LYOPHILIZED, FOR SOLUTION INTRAVENOUS EVERY 12 HOURS SCHEDULED
Status: DISCONTINUED | OUTPATIENT
Start: 2022-09-23 | End: 2022-09-24 | Stop reason: HOSPADM

## 2022-09-23 RX ORDER — BUDESONIDE 0.5 MG/2ML
0.5 INHALANT ORAL 2 TIMES DAILY
Status: DISCONTINUED | OUTPATIENT
Start: 2022-09-23 | End: 2022-09-24 | Stop reason: HOSPADM

## 2022-09-23 RX ORDER — ONDANSETRON 2 MG/ML
4 INJECTION INTRAMUSCULAR; INTRAVENOUS EVERY 6 HOURS PRN
Status: DISCONTINUED | OUTPATIENT
Start: 2022-09-23 | End: 2022-09-24 | Stop reason: HOSPADM

## 2022-09-23 RX ORDER — GABAPENTIN 300 MG/1
300 CAPSULE ORAL 3 TIMES DAILY
Status: DISCONTINUED | OUTPATIENT
Start: 2022-09-23 | End: 2022-09-24 | Stop reason: HOSPADM

## 2022-09-23 RX ORDER — IPRATROPIUM BROMIDE AND ALBUTEROL SULFATE 2.5; .5 MG/3ML; MG/3ML
3 SOLUTION RESPIRATORY (INHALATION)
Refills: 3 | Status: DISCONTINUED | OUTPATIENT
Start: 2022-09-23 | End: 2022-09-24 | Stop reason: HOSPADM

## 2022-09-23 RX ORDER — FUROSEMIDE 40 MG/1
40 TABLET ORAL DAILY
Status: DISCONTINUED | OUTPATIENT
Start: 2022-09-23 | End: 2022-09-24 | Stop reason: HOSPADM

## 2022-09-23 RX ORDER — FLUOXETINE HYDROCHLORIDE 20 MG/1
20 CAPSULE ORAL DAILY
Status: DISCONTINUED | OUTPATIENT
Start: 2022-09-23 | End: 2022-09-24 | Stop reason: HOSPADM

## 2022-09-23 RX ORDER — NITROGLYCERIN 0.4 MG/1
0.4 TABLET SUBLINGUAL
Status: DISCONTINUED | OUTPATIENT
Start: 2022-09-23 | End: 2022-09-24 | Stop reason: HOSPADM

## 2022-09-23 RX ADMIN — FUROSEMIDE 40 MG: 40 TABLET ORAL at 20:18

## 2022-09-23 RX ADMIN — MULTIPLE VITAMINS W/ MINERALS TAB 1 TABLET: TAB at 20:19

## 2022-09-23 RX ADMIN — Medication 10 ML: at 20:18

## 2022-09-23 RX ADMIN — POLYETHYLENE GLYCOL 3350, SODIUM SULFATE ANHYDROUS, SODIUM BICARBONATE, SODIUM CHLORIDE, POTASSIUM CHLORIDE 2000 ML: 236; 22.74; 6.74; 5.86; 2.97 POWDER, FOR SOLUTION ORAL at 20:32

## 2022-09-23 RX ADMIN — FLUOXETINE HYDROCHLORIDE 20 MG: 20 CAPSULE ORAL at 20:19

## 2022-09-23 RX ADMIN — GABAPENTIN 300 MG: 300 CAPSULE ORAL at 20:19

## 2022-09-23 RX ADMIN — PANTOPRAZOLE SODIUM 40 MG: 40 INJECTION, POWDER, FOR SOLUTION INTRAVENOUS at 20:18

## 2022-09-24 ENCOUNTER — ON CAMPUS - OUTPATIENT (OUTPATIENT)
Dept: URBAN - METROPOLITAN AREA HOSPITAL 114 | Facility: HOSPITAL | Age: 82
End: 2022-09-24

## 2022-09-24 ENCOUNTER — ANESTHESIA (OUTPATIENT)
Dept: GASTROENTEROLOGY | Facility: HOSPITAL | Age: 82
End: 2022-09-24

## 2022-09-24 ENCOUNTER — ANESTHESIA EVENT (OUTPATIENT)
Dept: GASTROENTEROLOGY | Facility: HOSPITAL | Age: 82
End: 2022-09-24

## 2022-09-24 ENCOUNTER — READMISSION MANAGEMENT (OUTPATIENT)
Dept: CALL CENTER | Facility: HOSPITAL | Age: 82
End: 2022-09-24

## 2022-09-24 VITALS
BODY MASS INDEX: 26.49 KG/M2 | HEART RATE: 68 BPM | HEIGHT: 71 IN | OXYGEN SATURATION: 99 % | TEMPERATURE: 97.7 F | WEIGHT: 189.2 LBS | RESPIRATION RATE: 19 BRPM | SYSTOLIC BLOOD PRESSURE: 102 MMHG | DIASTOLIC BLOOD PRESSURE: 81 MMHG

## 2022-09-24 DIAGNOSIS — K92.1 MELENA: ICD-10-CM

## 2022-09-24 DIAGNOSIS — K62.7 RADIATION PROCTITIS: ICD-10-CM

## 2022-09-24 DIAGNOSIS — K44.9 DIAPHRAGMATIC HERNIA WITHOUT OBSTRUCTION OR GANGRENE: ICD-10-CM

## 2022-09-24 DIAGNOSIS — K57.30 DIVERTICULOSIS OF LARGE INTESTINE WITHOUT PERFORATION OR ABS: ICD-10-CM

## 2022-09-24 DIAGNOSIS — K21.00 GASTRO-ESOPHAGEAL REFLUX DISEASE WITH ESOPHAGITIS, WITHOUT B: ICD-10-CM

## 2022-09-24 DIAGNOSIS — K55.21 ANGIODYSPLASIA OF COLON WITH HEMORRHAGE: ICD-10-CM

## 2022-09-24 PROBLEM — K20.90 ESOPHAGITIS: Status: ACTIVE | Noted: 2022-09-24

## 2022-09-24 LAB
ANION GAP SERPL CALCULATED.3IONS-SCNC: 8.2 MMOL/L (ref 5–15)
BASOPHILS # BLD AUTO: 0.06 10*3/MM3 (ref 0–0.2)
BASOPHILS NFR BLD AUTO: 0.8 % (ref 0–1.5)
BUN SERPL-MCNC: 23 MG/DL (ref 8–23)
BUN/CREAT SERPL: 27.4 (ref 7–25)
CALCIUM SPEC-SCNC: 8.8 MG/DL (ref 8.6–10.5)
CHLORIDE SERPL-SCNC: 107 MMOL/L (ref 98–107)
CO2 SERPL-SCNC: 24.8 MMOL/L (ref 22–29)
CREAT SERPL-MCNC: 0.84 MG/DL (ref 0.76–1.27)
DEPRECATED RDW RBC AUTO: 46.3 FL (ref 37–54)
EGFRCR SERPLBLD CKD-EPI 2021: 86 ML/MIN/1.73
EOSINOPHIL # BLD AUTO: 1.77 10*3/MM3 (ref 0–0.4)
EOSINOPHIL NFR BLD AUTO: 23.2 % (ref 0.3–6.2)
ERYTHROCYTE [DISTWIDTH] IN BLOOD BY AUTOMATED COUNT: 12.9 % (ref 12.3–15.4)
GLUCOSE SERPL-MCNC: 95 MG/DL (ref 65–99)
HCT VFR BLD AUTO: 36 % (ref 37.5–51)
HGB BLD-MCNC: 11.8 G/DL (ref 13–17.7)
IMM GRANULOCYTES # BLD AUTO: 0.02 10*3/MM3 (ref 0–0.05)
IMM GRANULOCYTES NFR BLD AUTO: 0.3 % (ref 0–0.5)
INR PPP: 1.04 (ref 0.9–1.1)
LYMPHOCYTES # BLD AUTO: 1.72 10*3/MM3 (ref 0.7–3.1)
LYMPHOCYTES NFR BLD AUTO: 22.6 % (ref 19.6–45.3)
MCH RBC QN AUTO: 32.2 PG (ref 26.6–33)
MCHC RBC AUTO-ENTMCNC: 32.8 G/DL (ref 31.5–35.7)
MCV RBC AUTO: 98.1 FL (ref 79–97)
MONOCYTES # BLD AUTO: 0.76 10*3/MM3 (ref 0.1–0.9)
MONOCYTES NFR BLD AUTO: 10 % (ref 5–12)
NEUTROPHILS NFR BLD AUTO: 3.29 10*3/MM3 (ref 1.7–7)
NEUTROPHILS NFR BLD AUTO: 43.1 % (ref 42.7–76)
NRBC BLD AUTO-RTO: 0 /100 WBC (ref 0–0.2)
PLATELET # BLD AUTO: 193 10*3/MM3 (ref 140–450)
PMV BLD AUTO: 9.6 FL (ref 6–12)
POTASSIUM SERPL-SCNC: 3.8 MMOL/L (ref 3.5–5.2)
PROTHROMBIN TIME: 13.5 SECONDS (ref 11.7–14.2)
RBC # BLD AUTO: 3.67 10*6/MM3 (ref 4.14–5.8)
SODIUM SERPL-SCNC: 140 MMOL/L (ref 136–145)
WBC NRBC COR # BLD: 7.62 10*3/MM3 (ref 3.4–10.8)

## 2022-09-24 PROCEDURE — 85025 COMPLETE CBC W/AUTO DIFF WBC: CPT | Performed by: NURSE PRACTITIONER

## 2022-09-24 PROCEDURE — 94799 UNLISTED PULMONARY SVC/PX: CPT

## 2022-09-24 PROCEDURE — 96376 TX/PRO/DX INJ SAME DRUG ADON: CPT

## 2022-09-24 PROCEDURE — G0378 HOSPITAL OBSERVATION PER HR: HCPCS

## 2022-09-24 PROCEDURE — A9270 NON-COVERED ITEM OR SERVICE: HCPCS | Performed by: NURSE PRACTITIONER

## 2022-09-24 PROCEDURE — 43239 EGD BIOPSY SINGLE/MULTIPLE: CPT | Performed by: INTERNAL MEDICINE

## 2022-09-24 PROCEDURE — 94640 AIRWAY INHALATION TREATMENT: CPT

## 2022-09-24 PROCEDURE — 85610 PROTHROMBIN TIME: CPT | Performed by: NURSE PRACTITIONER

## 2022-09-24 PROCEDURE — 94761 N-INVAS EAR/PLS OXIMETRY MLT: CPT

## 2022-09-24 PROCEDURE — 88305 TISSUE EXAM BY PATHOLOGIST: CPT | Performed by: INTERNAL MEDICINE

## 2022-09-24 PROCEDURE — 80048 BASIC METABOLIC PNL TOTAL CA: CPT | Performed by: NURSE PRACTITIONER

## 2022-09-24 PROCEDURE — 63710000001 FLUOXETINE 20 MG CAPSULE: Performed by: NURSE PRACTITIONER

## 2022-09-24 PROCEDURE — 45382 COLONOSCOPY W/CONTROL BLEED: CPT | Performed by: INTERNAL MEDICINE

## 2022-09-24 PROCEDURE — 88312 SPECIAL STAINS GROUP 1: CPT | Performed by: INTERNAL MEDICINE

## 2022-09-24 PROCEDURE — 25010000002 PROPOFOL 10 MG/ML EMULSION: Performed by: ANESTHESIOLOGY

## 2022-09-24 PROCEDURE — 94664 DEMO&/EVAL PT USE INHALER: CPT

## 2022-09-24 RX ORDER — EPHEDRINE SULFATE 50 MG/ML
INJECTION, SOLUTION INTRAVENOUS AS NEEDED
Status: DISCONTINUED | OUTPATIENT
Start: 2022-09-24 | End: 2022-09-24 | Stop reason: SURG

## 2022-09-24 RX ORDER — SODIUM CHLORIDE 0.9 % (FLUSH) 0.9 %
10 SYRINGE (ML) INJECTION AS NEEDED
Status: DISCONTINUED | OUTPATIENT
Start: 2022-09-24 | End: 2022-09-24 | Stop reason: HOSPADM

## 2022-09-24 RX ORDER — SODIUM CHLORIDE 9 MG/ML
30 INJECTION, SOLUTION INTRAVENOUS CONTINUOUS PRN
Status: DISCONTINUED | OUTPATIENT
Start: 2022-09-24 | End: 2022-09-24 | Stop reason: HOSPADM

## 2022-09-24 RX ORDER — SODIUM CHLORIDE 0.9 % (FLUSH) 0.9 %
10 SYRINGE (ML) INJECTION EVERY 12 HOURS SCHEDULED
Status: DISCONTINUED | OUTPATIENT
Start: 2022-09-24 | End: 2022-09-24 | Stop reason: HOSPADM

## 2022-09-24 RX ORDER — PANTOPRAZOLE SODIUM 40 MG/1
40 TABLET, DELAYED RELEASE ORAL 2 TIMES DAILY
Qty: 60 TABLET | Refills: 0 | Status: ON HOLD | OUTPATIENT
Start: 2022-09-24 | End: 2022-11-14

## 2022-09-24 RX ORDER — LIDOCAINE HYDROCHLORIDE 20 MG/ML
INJECTION, SOLUTION INFILTRATION; PERINEURAL AS NEEDED
Status: DISCONTINUED | OUTPATIENT
Start: 2022-09-24 | End: 2022-09-24 | Stop reason: SURG

## 2022-09-24 RX ORDER — SODIUM CHLORIDE, SODIUM LACTATE, POTASSIUM CHLORIDE, CALCIUM CHLORIDE 600; 310; 30; 20 MG/100ML; MG/100ML; MG/100ML; MG/100ML
INJECTION, SOLUTION INTRAVENOUS CONTINUOUS PRN
Status: DISCONTINUED | OUTPATIENT
Start: 2022-09-24 | End: 2022-09-24 | Stop reason: SURG

## 2022-09-24 RX ORDER — SUCRALFATE ORAL 1 G/10ML
1 SUSPENSION ORAL 4 TIMES DAILY
Qty: 1200 ML | Refills: 0 | Status: SHIPPED | OUTPATIENT
Start: 2022-09-24 | End: 2022-11-21

## 2022-09-24 RX ORDER — PROPOFOL 10 MG/ML
VIAL (ML) INTRAVENOUS CONTINUOUS PRN
Status: DISCONTINUED | OUTPATIENT
Start: 2022-09-24 | End: 2022-09-24 | Stop reason: SURG

## 2022-09-24 RX ORDER — PROPOFOL 10 MG/ML
VIAL (ML) INTRAVENOUS AS NEEDED
Status: DISCONTINUED | OUTPATIENT
Start: 2022-09-24 | End: 2022-09-24 | Stop reason: SURG

## 2022-09-24 RX ADMIN — PANTOPRAZOLE SODIUM 40 MG: 40 INJECTION, POWDER, FOR SOLUTION INTRAVENOUS at 08:00

## 2022-09-24 RX ADMIN — LIDOCAINE HYDROCHLORIDE 100 MG: 20 INJECTION, SOLUTION INFILTRATION; PERINEURAL at 11:45

## 2022-09-24 RX ADMIN — MULTIPLE VITAMINS W/ MINERALS TAB 1 TABLET: TAB at 08:00

## 2022-09-24 RX ADMIN — SODIUM CHLORIDE 30 ML/HR: 9 INJECTION, SOLUTION INTRAVENOUS at 11:33

## 2022-09-24 RX ADMIN — EPHEDRINE SULFATE 15 MG: 50 INJECTION INTRAVENOUS at 12:01

## 2022-09-24 RX ADMIN — Medication 200 MCG/KG/MIN: at 11:48

## 2022-09-24 RX ADMIN — IPRATROPIUM BROMIDE AND ALBUTEROL SULFATE 3 ML: .5; 3 SOLUTION RESPIRATORY (INHALATION) at 08:08

## 2022-09-24 RX ADMIN — BUDESONIDE 0.5 MG: 0.5 INHALANT ORAL at 00:19

## 2022-09-24 RX ADMIN — PROPOFOL 100 MG: 10 INJECTION, EMULSION INTRAVENOUS at 11:46

## 2022-09-24 RX ADMIN — SODIUM CHLORIDE, POTASSIUM CHLORIDE, SODIUM LACTATE AND CALCIUM CHLORIDE: 600; 310; 30; 20 INJECTION, SOLUTION INTRAVENOUS at 11:44

## 2022-09-24 RX ADMIN — BUDESONIDE 0.5 MG: 0.5 INHALANT ORAL at 08:09

## 2022-09-24 RX ADMIN — IPRATROPIUM BROMIDE AND ALBUTEROL SULFATE 3 ML: .5; 3 SOLUTION RESPIRATORY (INHALATION) at 00:18

## 2022-09-24 RX ADMIN — FLUOXETINE HYDROCHLORIDE 20 MG: 20 CAPSULE ORAL at 14:07

## 2022-09-24 RX ADMIN — POLYETHYLENE GLYCOL 3350, SODIUM SULFATE ANHYDROUS, SODIUM BICARBONATE, SODIUM CHLORIDE, POTASSIUM CHLORIDE 2000 ML: 236; 22.74; 6.74; 5.86; 2.97 POWDER, FOR SOLUTION ORAL at 00:55

## 2022-09-24 RX ADMIN — Medication 10 ML: at 12:58

## 2022-09-24 NOTE — ANESTHESIA POSTPROCEDURE EVALUATION
"Patient: Tony Leonard    Procedure Summary     Date: 09/24/22 Room / Location:  [facility]    Anesthesia Start: 1141 Anesthesia Stop: 1215    Procedures:       ESOPHAGOGASTRODUODENOSCOPY (N/A Esophagus)      COLONOSCOPY to cecum and TI with argon plasma coagulation. (N/A ) Diagnosis:       Rectal bleed      (Rectal bleed [K62.5])    Surgeons: Bruce Black MD Provider: Juliano Lou MD    Anesthesia Type: MAC ASA Status: 4          Anesthesia Type: MAC    Vitals  Vitals Value Taken Time   /61 09/24/22 1223   Temp     Pulse 90 09/24/22 1223   Resp 18 09/24/22 1223   SpO2 96 % 09/24/22 1223           Post Anesthesia Care and Evaluation    Patient location during evaluation: bedside  Patient participation: complete - patient participated  Level of consciousness: sleepy but conscious  Pain score: 0  Pain management: adequate    Airway patency: patent  Anesthetic complications: No anesthetic complications    Cardiovascular status: acceptable  Respiratory status: acceptable  Hydration status: acceptable    Comments: /61 (BP Location: Right arm, Patient Position: Lying)   Pulse 90   Temp 36.7 °C (98 °F) (Oral)   Resp 18   Ht 180.3 cm (70.98\")   Wt 85.8 kg (189 lb 3.2 oz)   SpO2 96%   BMI 26.40 kg/m²         "

## 2022-09-24 NOTE — ANESTHESIA PREPROCEDURE EVALUATION
Anesthesia Evaluation     NPO Solid Status: > 8 hours  NPO Liquid Status: > 4 hours           Airway   Mallampati: II  Neck ROM: full  No difficulty expected  Dental - normal exam     Pulmonary     breath sounds clear to auscultation  (+) pneumonia , COPD, home oxygen,   Cardiovascular     Rhythm: regular    (+) CHF ,       Neuro/Psych  (+) numbness, psychiatric history ADD,    GI/Hepatic/Renal/Endo    (+)  GERD, GI bleeding ,     Musculoskeletal     Abdominal    Substance History      OB/GYN          Other   arthritis,    history of cancer                    Anesthesia Plan    ASA 4     MAC     intravenous induction     Anesthetic plan, risks, benefits, and alternatives have been provided, discussed and informed consent has been obtained with: patient.        CODE STATUS:    Level Of Support Discussed With: Patient  Code Status (Patient has no pulse and is not breathing): CPR (Attempt to Resuscitate)  Medical Interventions (Patient has pulse or is breathing): Full Support

## 2022-09-24 NOTE — OUTREACH NOTE
Prep Survey    Flowsheet Row Responses   [facility] facility patient discharged from? [place]   Is LACE score < 7 ? No   Emergency Room discharge w/ pulse ox? No   [facility]   Date of Admission 09/23/22   Date of Discharge 09/24/22   Discharge Disposition Home or Self Care   Discharge diagnosis EGD   Does the patient have one of the following disease processes/diagnoses(primary or secondary)? Other   Does the patient have Home health ordered? No   Is there a DME ordered? No   Prep survey completed? Yes          SHARITA DC - Registered Nurse

## 2022-09-26 ENCOUNTER — TRANSITIONAL CARE MANAGEMENT TELEPHONE ENCOUNTER (OUTPATIENT)
Dept: CALL CENTER | Facility: HOSPITAL | Age: 82
End: 2022-09-26

## 2022-09-26 NOTE — OUTREACH NOTE
Call Center TCM Note    Flowsheet Row Responses   [facility] patient discharged from? [place]   Does the patient have one of the following disease processes/diagnoses(primary or secondary)? Other   TCM attempt successful? No  [No verbal release for PCP]   Unsuccessful attempts Attempt 1   Call Status Voice mail issues          Yolanda Fernandez RN    9/26/2022, 11:41 EDT

## 2022-09-26 NOTE — OUTREACH NOTE
Call Center TCM Note    Flowsheet Row Responses   [facility] patient discharged from? [place]   Does the patient have one of the following disease processes/diagnoses(primary or secondary)? Other   TCM attempt successful? No   Unsuccessful attempts Attempt 2   Call Status Voice mail issues          Yolanda Fernandez RN    9/26/2022, 16:10 EDT

## 2022-09-27 ENCOUNTER — TRANSITIONAL CARE MANAGEMENT TELEPHONE ENCOUNTER (OUTPATIENT)
Dept: CALL CENTER | Facility: HOSPITAL | Age: 82
End: 2022-09-27

## 2022-09-27 PROBLEM — K44.9 HIATAL HERNIA: Status: ACTIVE | Noted: 2022-09-27

## 2022-09-27 PROBLEM — I99.8 ANGIECTASIA: Status: ACTIVE | Noted: 2022-09-27

## 2022-09-27 NOTE — OUTREACH NOTE
Call Center TCM Note    Flowsheet Row Responses   [facility] patient discharged from? [place]   Does the patient have one of the following disease processes/diagnoses(primary or secondary)? Other   TCM attempt successful? No   Unsuccessful attempts Attempt 3   Wrap up additional comments D/C DX: rectal bleed,  EGD ** uUnable to reach pt x 3 attempts for TCM call. Pt is not yet sched for TCM APPT with PCP Dr Aviles. d/c date was 09/24/2022.          Yessica Cheema MA    9/27/2022, 10:42 EDT

## 2022-09-28 LAB
LAB AP CASE REPORT: NORMAL
PATH REPORT.FINAL DX SPEC: NORMAL
PATH REPORT.GROSS SPEC: NORMAL

## 2022-11-04 ENCOUNTER — OFFICE VISIT (OUTPATIENT)
Dept: ORTHOPEDIC SURGERY | Facility: CLINIC | Age: 82
End: 2022-11-04

## 2022-11-04 VITALS — WEIGHT: 192 LBS | HEIGHT: 71 IN | BODY MASS INDEX: 26.88 KG/M2 | TEMPERATURE: 98 F

## 2022-11-04 DIAGNOSIS — M41.9 ACQUIRED SCOLIOSIS: Primary | ICD-10-CM

## 2022-11-04 DIAGNOSIS — M48.061 SPINAL STENOSIS OF LUMBAR REGION, UNSPECIFIED WHETHER NEUROGENIC CLAUDICATION PRESENT: ICD-10-CM

## 2022-11-04 PROCEDURE — 99213 OFFICE O/P EST LOW 20 MIN: CPT | Performed by: ORTHOPAEDIC SURGERY

## 2022-11-04 PROCEDURE — 72100 X-RAY EXAM L-S SPINE 2/3 VWS: CPT | Performed by: ORTHOPAEDIC SURGERY

## 2022-11-04 NOTE — PROGRESS NOTES
He is having some back and bilateral hip pain but not much change from prior exam.  Good strength in the legs bilaterally knee is stable on a walker and got x-ray and back without a problem.  I saw him in the hospital and has had some significant decline in function in the last year but I do not think he is a good candidate for surgery.  X-rays show severe scoliosis which is slightly imbalance to the left, some osteoporotic appearing bone, healing L3 fracture and not much change from prior images.  Everything stable in that regard and I do not think he is a good candidate for surgery but is done well with epidurals in the past so I recommend repeating these.  He is aware I am retiring at the end of the year.  He asked for refills on Neurontin, ropinirole and Naprosyn but I told him with his age and level of infirmity as well as my impending skilled nursing it would be best if he received these from Dr. Aviles.

## 2022-11-10 DIAGNOSIS — G89.29 CHRONIC LOW BACK PAIN WITH SCIATICA, SCIATICA LATERALITY UNSPECIFIED, UNSPECIFIED BACK PAIN LATERALITY: ICD-10-CM

## 2022-11-10 DIAGNOSIS — M54.40 CHRONIC LOW BACK PAIN WITH SCIATICA, SCIATICA LATERALITY UNSPECIFIED, UNSPECIFIED BACK PAIN LATERALITY: ICD-10-CM

## 2022-11-10 RX ORDER — GABAPENTIN 300 MG/1
300 CAPSULE ORAL 3 TIMES DAILY
Qty: 9 CAPSULE | Refills: 0 | Status: ON HOLD | OUTPATIENT
Start: 2022-11-10 | End: 2022-11-23 | Stop reason: SDUPTHER

## 2022-11-10 NOTE — TELEPHONE ENCOUNTER
Caller: Tony Leonard    Relationship: Self    Best call back number: [phone]    Requested Prescriptions:   Requested Prescriptions     Pending Prescriptions Disp Refills   • gabapentin (NEURONTIN) 300 MG capsule 9 capsule 0     Sig: Take 1 capsule by mouth 3 (Three) Times a Day.        Pharmacy where request should be sent: [facility] [id number] [facility] [address] AT [address] [facility] & ([facility] [phone] [facility] [phone] FX     Additional details provided by patient: PATIENT STATED HE HAS 1 DAY LEFT OF THIS MEDICATION.    PATIENT WOULD LIKE A CALL IF THIS CAN BE REFILLED OR NOT.    Does the patient have less than a 3 day supply:  [x] Yes  [] No    Cherelle Farrell Rep   11/10/22 13:37 EST

## 2022-11-13 ENCOUNTER — APPOINTMENT (OUTPATIENT)
Dept: GENERAL RADIOLOGY | Facility: HOSPITAL | Age: 82
End: 2022-11-13

## 2022-11-13 ENCOUNTER — HOSPITAL ENCOUNTER (INPATIENT)
Facility: HOSPITAL | Age: 82
LOS: 5 days | Discharge: HOME HEALTH | End: 2022-11-19
Attending: EMERGENCY MEDICINE | Admitting: INTERNAL MEDICINE

## 2022-11-13 DIAGNOSIS — Z99.81 ON HOME OXYGEN THERAPY: ICD-10-CM

## 2022-11-13 DIAGNOSIS — J44.1 COPD EXACERBATION: ICD-10-CM

## 2022-11-13 DIAGNOSIS — J44.1 CHRONIC OBSTRUCTIVE PULMONARY DISEASE WITH ACUTE EXACERBATION: ICD-10-CM

## 2022-11-13 DIAGNOSIS — J44.1 ACUTE EXACERBATION OF CHRONIC OBSTRUCTIVE PULMONARY DISEASE (COPD): Primary | ICD-10-CM

## 2022-11-13 DIAGNOSIS — Z79.899 LONG TERM CURRENT USE OF DIURETIC: ICD-10-CM

## 2022-11-13 DIAGNOSIS — I50.32 CHRONIC DIASTOLIC CHF (CONGESTIVE HEART FAILURE): ICD-10-CM

## 2022-11-13 DIAGNOSIS — Z87.19 HISTORY OF RECTAL BLEEDING: ICD-10-CM

## 2022-11-13 DIAGNOSIS — D72.829 LEUKOCYTOSIS, UNSPECIFIED TYPE: ICD-10-CM

## 2022-11-13 LAB
ABO GROUP BLD: NORMAL
ALBUMIN SERPL-MCNC: 3.8 G/DL (ref 3.5–5.2)
ALBUMIN/GLOB SERPL: 1.5 G/DL
ALP SERPL-CCNC: 86 U/L (ref 39–117)
ALT SERPL W P-5'-P-CCNC: 18 U/L (ref 1–41)
ANION GAP SERPL CALCULATED.3IONS-SCNC: 12.5 MMOL/L (ref 5–15)
AST SERPL-CCNC: 27 U/L (ref 1–40)
B PARAPERT DNA SPEC QL NAA+PROBE: NOT DETECTED
B PERT DNA SPEC QL NAA+PROBE: NOT DETECTED
BASOPHILS # BLD AUTO: 0.08 10*3/MM3 (ref 0–0.2)
BASOPHILS NFR BLD AUTO: 0.9 % (ref 0–1.5)
BILIRUB SERPL-MCNC: 0.4 MG/DL (ref 0–1.2)
BLD GP AB SCN SERPL QL: NEGATIVE
BUN SERPL-MCNC: 28 MG/DL (ref 8–23)
BUN/CREAT SERPL: 23.7 (ref 7–25)
C PNEUM DNA NPH QL NAA+NON-PROBE: NOT DETECTED
CALCIUM SPEC-SCNC: 9 MG/DL (ref 8.6–10.5)
CHLORIDE SERPL-SCNC: 108 MMOL/L (ref 98–107)
CO2 SERPL-SCNC: 21.5 MMOL/L (ref 22–29)
CREAT SERPL-MCNC: 1.18 MG/DL (ref 0.76–1.27)
DEPRECATED RDW RBC AUTO: 43.9 FL (ref 37–54)
EGFRCR SERPLBLD CKD-EPI 2021: 60.8 ML/MIN/1.73
EOSINOPHIL # BLD AUTO: 1.36 10*3/MM3 (ref 0–0.4)
EOSINOPHIL NFR BLD AUTO: 15.7 % (ref 0.3–6.2)
ERYTHROCYTE [DISTWIDTH] IN BLOOD BY AUTOMATED COUNT: 11.9 % (ref 12.3–15.4)
EXPIRATION DATE: NORMAL
FECAL OCCULT BLOOD SCREEN, POC: NEGATIVE
FLUAV SUBTYP SPEC NAA+PROBE: NOT DETECTED
FLUBV RNA ISLT QL NAA+PROBE: NOT DETECTED
GLOBULIN UR ELPH-MCNC: 2.6 GM/DL
GLUCOSE SERPL-MCNC: 98 MG/DL (ref 65–99)
HADV DNA SPEC NAA+PROBE: NOT DETECTED
HCOV 229E RNA SPEC QL NAA+PROBE: NOT DETECTED
HCOV HKU1 RNA SPEC QL NAA+PROBE: NOT DETECTED
HCOV NL63 RNA SPEC QL NAA+PROBE: NOT DETECTED
HCOV OC43 RNA SPEC QL NAA+PROBE: NOT DETECTED
HCT VFR BLD AUTO: 39.8 % (ref 37.5–51)
HGB BLD-MCNC: 13.2 G/DL (ref 13–17.7)
HMPV RNA NPH QL NAA+NON-PROBE: NOT DETECTED
HOLD SPECIMEN: NORMAL
HOLD SPECIMEN: NORMAL
HPIV1 RNA ISLT QL NAA+PROBE: NOT DETECTED
HPIV2 RNA SPEC QL NAA+PROBE: NOT DETECTED
HPIV3 RNA NPH QL NAA+PROBE: NOT DETECTED
HPIV4 P GENE NPH QL NAA+PROBE: NOT DETECTED
IMM GRANULOCYTES # BLD AUTO: 0.01 10*3/MM3 (ref 0–0.05)
IMM GRANULOCYTES NFR BLD AUTO: 0.1 % (ref 0–0.5)
LYMPHOCYTES # BLD AUTO: 2.61 10*3/MM3 (ref 0.7–3.1)
LYMPHOCYTES NFR BLD AUTO: 30 % (ref 19.6–45.3)
Lab: 149
M PNEUMO IGG SER IA-ACNC: NOT DETECTED
MCH RBC QN AUTO: 32.8 PG (ref 26.6–33)
MCHC RBC AUTO-ENTMCNC: 33.2 G/DL (ref 31.5–35.7)
MCV RBC AUTO: 99 FL (ref 79–97)
MONOCYTES # BLD AUTO: 0.76 10*3/MM3 (ref 0.1–0.9)
MONOCYTES NFR BLD AUTO: 8.7 % (ref 5–12)
NEGATIVE CONTROL: NEGATIVE
NEUTROPHILS NFR BLD AUTO: 3.87 10*3/MM3 (ref 1.7–7)
NEUTROPHILS NFR BLD AUTO: 44.6 % (ref 42.7–76)
NRBC BLD AUTO-RTO: 0 /100 WBC (ref 0–0.2)
NT-PROBNP SERPL-MCNC: 457 PG/ML (ref 0–1800)
PLATELET # BLD AUTO: 205 10*3/MM3 (ref 140–450)
PMV BLD AUTO: 10 FL (ref 6–12)
POSITIVE CONTROL: POSITIVE
POTASSIUM SERPL-SCNC: 4.8 MMOL/L (ref 3.5–5.2)
PROT SERPL-MCNC: 6.4 G/DL (ref 6–8.5)
QT INTERVAL: 397 MS
RBC # BLD AUTO: 4.02 10*6/MM3 (ref 4.14–5.8)
RH BLD: POSITIVE
RHINOVIRUS RNA SPEC NAA+PROBE: NOT DETECTED
RSV RNA NPH QL NAA+NON-PROBE: NOT DETECTED
SARS-COV-2 RNA NPH QL NAA+NON-PROBE: NOT DETECTED
SODIUM SERPL-SCNC: 142 MMOL/L (ref 136–145)
T&S EXPIRATION DATE: NORMAL
TROPONIN T SERPL-MCNC: <0.01 NG/ML (ref 0–0.03)
WBC NRBC COR # BLD: 8.69 10*3/MM3 (ref 3.4–10.8)
WHOLE BLOOD HOLD COAG: NORMAL
WHOLE BLOOD HOLD SPECIMEN: NORMAL

## 2022-11-13 PROCEDURE — 94760 N-INVAS EAR/PLS OXIMETRY 1: CPT

## 2022-11-13 PROCEDURE — 80053 COMPREHEN METABOLIC PANEL: CPT | Performed by: EMERGENCY MEDICINE

## 2022-11-13 PROCEDURE — 94799 UNLISTED PULMONARY SVC/PX: CPT

## 2022-11-13 PROCEDURE — 93010 ELECTROCARDIOGRAM REPORT: CPT | Performed by: INTERNAL MEDICINE

## 2022-11-13 PROCEDURE — 82270 OCCULT BLOOD FECES: CPT | Performed by: EMERGENCY MEDICINE

## 2022-11-13 PROCEDURE — 86901 BLOOD TYPING SEROLOGIC RH(D): CPT | Performed by: EMERGENCY MEDICINE

## 2022-11-13 PROCEDURE — G0378 HOSPITAL OBSERVATION PER HR: HCPCS

## 2022-11-13 PROCEDURE — 83880 ASSAY OF NATRIURETIC PEPTIDE: CPT | Performed by: EMERGENCY MEDICINE

## 2022-11-13 PROCEDURE — 25010000002 METHYLPREDNISOLONE PER 125 MG: Performed by: EMERGENCY MEDICINE

## 2022-11-13 PROCEDURE — 71046 X-RAY EXAM CHEST 2 VIEWS: CPT

## 2022-11-13 PROCEDURE — 86900 BLOOD TYPING SEROLOGIC ABO: CPT | Performed by: EMERGENCY MEDICINE

## 2022-11-13 PROCEDURE — 0202U NFCT DS 22 TRGT SARS-COV-2: CPT | Performed by: EMERGENCY MEDICINE

## 2022-11-13 PROCEDURE — 94640 AIRWAY INHALATION TREATMENT: CPT

## 2022-11-13 PROCEDURE — 93005 ELECTROCARDIOGRAM TRACING: CPT | Performed by: EMERGENCY MEDICINE

## 2022-11-13 PROCEDURE — 99285 EMERGENCY DEPT VISIT HI MDM: CPT

## 2022-11-13 PROCEDURE — 84484 ASSAY OF TROPONIN QUANT: CPT | Performed by: EMERGENCY MEDICINE

## 2022-11-13 PROCEDURE — 85025 COMPLETE CBC W/AUTO DIFF WBC: CPT

## 2022-11-13 PROCEDURE — 94664 DEMO&/EVAL PT USE INHALER: CPT

## 2022-11-13 PROCEDURE — 86850 RBC ANTIBODY SCREEN: CPT | Performed by: EMERGENCY MEDICINE

## 2022-11-13 PROCEDURE — 94761 N-INVAS EAR/PLS OXIMETRY MLT: CPT

## 2022-11-13 RX ORDER — IPRATROPIUM BROMIDE AND ALBUTEROL SULFATE 2.5; .5 MG/3ML; MG/3ML
3 SOLUTION RESPIRATORY (INHALATION) EVERY 4 HOURS PRN
Status: DISCONTINUED | OUTPATIENT
Start: 2022-11-13 | End: 2022-11-14

## 2022-11-13 RX ORDER — FLUOXETINE HYDROCHLORIDE 20 MG/1
20 CAPSULE ORAL DAILY
Status: DISCONTINUED | OUTPATIENT
Start: 2022-11-14 | End: 2022-11-19 | Stop reason: HOSPADM

## 2022-11-13 RX ORDER — SODIUM CHLORIDE 0.9 % (FLUSH) 0.9 %
10 SYRINGE (ML) INJECTION AS NEEDED
Status: DISCONTINUED | OUTPATIENT
Start: 2022-11-13 | End: 2022-11-19 | Stop reason: HOSPADM

## 2022-11-13 RX ORDER — PANTOPRAZOLE SODIUM 40 MG/1
40 TABLET, DELAYED RELEASE ORAL 2 TIMES DAILY
Status: DISCONTINUED | OUTPATIENT
Start: 2022-11-13 | End: 2022-11-19 | Stop reason: HOSPADM

## 2022-11-13 RX ORDER — ACETAMINOPHEN 500 MG
500 TABLET ORAL EVERY 6 HOURS PRN
Status: DISCONTINUED | OUTPATIENT
Start: 2022-11-13 | End: 2022-11-13 | Stop reason: SDUPTHER

## 2022-11-13 RX ORDER — POLYETHYLENE GLYCOL 3350 17 G/17G
17 POWDER, FOR SOLUTION ORAL DAILY
Status: DISCONTINUED | OUTPATIENT
Start: 2022-11-14 | End: 2022-11-19 | Stop reason: HOSPADM

## 2022-11-13 RX ORDER — ACETAMINOPHEN 160 MG/5ML
650 SOLUTION ORAL EVERY 4 HOURS PRN
Status: DISCONTINUED | OUTPATIENT
Start: 2022-11-13 | End: 2022-11-19 | Stop reason: HOSPADM

## 2022-11-13 RX ORDER — SUCRALFATE 1 G/1
1 TABLET ORAL
Status: DISCONTINUED | OUTPATIENT
Start: 2022-11-13 | End: 2022-11-19 | Stop reason: HOSPADM

## 2022-11-13 RX ORDER — FUROSEMIDE 10 MG/ML
40 INJECTION INTRAMUSCULAR; INTRAVENOUS ONCE
Status: COMPLETED | OUTPATIENT
Start: 2022-11-14 | End: 2022-11-13

## 2022-11-13 RX ORDER — CALCIUM CARBONATE 200(500)MG
1 TABLET,CHEWABLE ORAL AS NEEDED
Status: DISCONTINUED | OUTPATIENT
Start: 2022-11-13 | End: 2022-11-19 | Stop reason: HOSPADM

## 2022-11-13 RX ORDER — ALBUTEROL SULFATE 2.5 MG/3ML
2.5 SOLUTION RESPIRATORY (INHALATION)
Status: COMPLETED | OUTPATIENT
Start: 2022-11-13 | End: 2022-11-13

## 2022-11-13 RX ORDER — ACETAMINOPHEN 325 MG/1
650 TABLET ORAL EVERY 4 HOURS PRN
Status: DISCONTINUED | OUTPATIENT
Start: 2022-11-13 | End: 2022-11-19 | Stop reason: HOSPADM

## 2022-11-13 RX ORDER — METHYLPREDNISOLONE SODIUM SUCCINATE 125 MG/2ML
60 INJECTION, POWDER, LYOPHILIZED, FOR SOLUTION INTRAMUSCULAR; INTRAVENOUS EVERY 8 HOURS
Status: DISCONTINUED | OUTPATIENT
Start: 2022-11-14 | End: 2022-11-14

## 2022-11-13 RX ORDER — ONDANSETRON 2 MG/ML
4 INJECTION INTRAMUSCULAR; INTRAVENOUS EVERY 6 HOURS PRN
Status: DISCONTINUED | OUTPATIENT
Start: 2022-11-13 | End: 2022-11-19 | Stop reason: HOSPADM

## 2022-11-13 RX ORDER — SODIUM CHLORIDE 0.9 % (FLUSH) 0.9 %
10 SYRINGE (ML) INJECTION EVERY 12 HOURS SCHEDULED
Status: DISCONTINUED | OUTPATIENT
Start: 2022-11-13 | End: 2022-11-19 | Stop reason: HOSPADM

## 2022-11-13 RX ORDER — ALBUTEROL SULFATE 2.5 MG/3ML
2.5 SOLUTION RESPIRATORY (INHALATION)
Status: DISCONTINUED | OUTPATIENT
Start: 2022-11-13 | End: 2022-11-14

## 2022-11-13 RX ORDER — NITROGLYCERIN 0.4 MG/1
0.4 TABLET SUBLINGUAL
Status: DISCONTINUED | OUTPATIENT
Start: 2022-11-13 | End: 2022-11-19 | Stop reason: HOSPADM

## 2022-11-13 RX ORDER — ACETAMINOPHEN 650 MG/1
650 SUPPOSITORY RECTAL EVERY 4 HOURS PRN
Status: DISCONTINUED | OUTPATIENT
Start: 2022-11-13 | End: 2022-11-19 | Stop reason: HOSPADM

## 2022-11-13 RX ORDER — GABAPENTIN 300 MG/1
300 CAPSULE ORAL 3 TIMES DAILY
Status: DISCONTINUED | OUTPATIENT
Start: 2022-11-13 | End: 2022-11-19 | Stop reason: HOSPADM

## 2022-11-13 RX ORDER — METHYLPHENIDATE HYDROCHLORIDE 5 MG/1
10 TABLET ORAL DAILY
Status: DISCONTINUED | OUTPATIENT
Start: 2022-11-14 | End: 2022-11-19 | Stop reason: HOSPADM

## 2022-11-13 RX ORDER — IPRATROPIUM BROMIDE AND ALBUTEROL SULFATE 2.5; .5 MG/3ML; MG/3ML
3 SOLUTION RESPIRATORY (INHALATION) ONCE
Status: COMPLETED | OUTPATIENT
Start: 2022-11-13 | End: 2022-11-13

## 2022-11-13 RX ORDER — METHYLPREDNISOLONE SODIUM SUCCINATE 125 MG/2ML
125 INJECTION, POWDER, LYOPHILIZED, FOR SOLUTION INTRAMUSCULAR; INTRAVENOUS ONCE
Status: COMPLETED | OUTPATIENT
Start: 2022-11-13 | End: 2022-11-13

## 2022-11-13 RX ORDER — ROPINIROLE 0.5 MG/1
0.5 TABLET, FILM COATED ORAL NIGHTLY
Status: DISCONTINUED | OUTPATIENT
Start: 2022-11-13 | End: 2022-11-19 | Stop reason: HOSPADM

## 2022-11-13 RX ORDER — FUROSEMIDE 40 MG/1
40 TABLET ORAL DAILY
Status: DISCONTINUED | OUTPATIENT
Start: 2022-11-14 | End: 2022-11-19 | Stop reason: HOSPADM

## 2022-11-13 RX ADMIN — Medication 10 ML: at 20:40

## 2022-11-13 RX ADMIN — METHYLPREDNISOLONE SODIUM SUCCINATE 60 MG: 125 INJECTION, POWDER, FOR SOLUTION INTRAMUSCULAR; INTRAVENOUS at 23:36

## 2022-11-13 RX ADMIN — IPRATROPIUM BROMIDE AND ALBUTEROL SULFATE 3 ML: 2.5; .5 SOLUTION RESPIRATORY (INHALATION) at 15:44

## 2022-11-13 RX ADMIN — ALBUTEROL SULFATE 2.5 MG: 2.5 SOLUTION RESPIRATORY (INHALATION) at 15:44

## 2022-11-13 RX ADMIN — FUROSEMIDE 40 MG: 10 INJECTION, SOLUTION INTRAMUSCULAR; INTRAVENOUS at 23:36

## 2022-11-13 RX ADMIN — GABAPENTIN 300 MG: 300 CAPSULE ORAL at 20:40

## 2022-11-13 RX ADMIN — PANTOPRAZOLE SODIUM 40 MG: 40 TABLET, DELAYED RELEASE ORAL at 20:40

## 2022-11-13 RX ADMIN — ALBUTEROL SULFATE 2.5 MG: 2.5 SOLUTION RESPIRATORY (INHALATION) at 15:45

## 2022-11-13 RX ADMIN — IPRATROPIUM BROMIDE AND ALBUTEROL SULFATE 3 ML: 2.5; .5 SOLUTION RESPIRATORY (INHALATION) at 23:17

## 2022-11-13 RX ADMIN — METHYLPREDNISOLONE SODIUM SUCCINATE 125 MG: 125 INJECTION, POWDER, FOR SOLUTION INTRAMUSCULAR; INTRAVENOUS at 15:44

## 2022-11-13 NOTE — ED NOTES
Pt presents to ED with complaints of SOA, bloody stool, lower back pain since Thursday. PT is A&OX4, able to ambulate with walker, and in a mask at this time.     Patient was placed in face mask during first look triage.  Patient was wearing a face mask throughout encounter.  I wore personal protective equipment throughout the encounter.  Hand hygiene was performed before and after patient encounter.

## 2022-11-13 NOTE — ED NOTES
Nursing report ED to floor  Tony Leonard  82 y.o.  male    HPI :   Chief Complaint   Patient presents with    Shortness of Breath    Black or Bloody Stool    Back Pain       Admitting doctor:   Danni Frye MD    Admitting diagnosis:   The primary encounter diagnosis was Acute exacerbation of chronic obstructive pulmonary disease (COPD) (HCC). A diagnosis of History of rectal bleeding was also pertinent to this visit.    Code status:   Current Code Status       Date Active Code Status Order ID Comments User Context       Prior            Allergies:   Daliresp [roflumilast], Latex, and Sulfa antibiotics    Isolation:   Enhanced Droplet/Contact     Intake and Output  No intake or output data in the 24 hours ending 11/13/22 1809    Weight:       11/13/22  1627   Weight: 81.6 kg (180 lb)       Most recent vitals:   Vitals:    11/13/22 1725 11/13/22 1800 11/13/22 1801 11/13/22 1802   BP:   136/75    BP Location:       Patient Position:       Pulse:  74  75   Resp:       Temp:       TempSrc:       SpO2: (!) 87% 98%  97%   Weight:       Height:           Active LDAs/IV Access:   Lines, Drains & Airways       Active LDAs       Name Placement date Placement time Site Days    Peripheral IV 11/13/22 1556 Anterior;Left Forearm 11/13/22  1556  Forearm  less than 1                    Labs (abnormal labs have a star):   Labs Reviewed   COMPREHENSIVE METABOLIC PANEL - Abnormal; Notable for the following components:       Result Value    BUN 28 (*)     Chloride 108 (*)     CO2 21.5 (*)     All other components within normal limits    Narrative:     GFR Normal >60  Chronic Kidney Disease <60  Kidney Failure <15    The GFR formula is only valid for adults with stable renal function between ages 18 and 70.   CBC WITH AUTO DIFFERENTIAL - Abnormal; Notable for the following components:    RBC 4.02 (*)     MCV 99.0 (*)     RDW 11.9 (*)     Eosinophil % 15.7 (*)     Eosinophils, Absolute 1.36 (*)     All other components within normal  limits   RESPIRATORY PANEL PCR W/ COVID-19 (SARS-COV-2) [facility] IN-HOUSE, NP SWAB IN UTM/VTP, 3-4 HR TAT - Normal    Narrative:     In the setting of a positive respiratory panel with a viral infection PLUS a negative procalcitonin without other underlying concern for bacterial infection, consider observing off antibiotics or discontinuation of antibiotics and continue supportive care. If the respiratory panel is positive for atypical bacterial infection (Bordetella pertussis, Chlamydophila pneumoniae, or Mycoplasma pneumoniae), consider antibiotic de-escalation to target atypical bacterial infection.   BNP (IN-HOUSE) - Normal    Narrative:     Among patients with dyspnea, NT-proBNP is highly sensitive for the detection of acute congestive heart failure. In addition NT-proBNP of <300 pg/ml effectively rules out acute congestive heart failure with 99% negative predictive value.    Results may be falsely decreased if patient taking Biotin.     TROPONIN (IN-HOUSE) - Normal    Narrative:     Troponin T Reference Range:  <= 0.03 ng/mL-   Negative for AMI  >0.03 ng/mL-     Abnormal for myocardial necrosis.  Clinicians would have to utilize clinical acumen, EKG, Troponin and serial changes to determine if it is an Acute Myocardial Infarction or myocardial injury due to an underlying chronic condition.       Results may be falsely decreased if patient taking Biotin.     POCT OCCULT BLOOD STOOL (ED ONLY) - Normal   RAINBOW DRAW    Narrative:     The following orders were created for panel order [place] Draw.  Procedure                               Abnormality         Status                     ---------                               -----------         ------                     Green Top (Gel)[[id number]]                                  Final result               Lavender Top[[id number]]                                     Final result               Gold Top - SST[[id number]]                                    Final result               Light Blue Top[[id number]]                                   Final result                 Please view results for these tests on the individual orders.   TYPE AND SCREEN   CBC AND DIFFERENTIAL    Narrative:     The following orders were created for panel order CBC & Differential.  Procedure                               Abnormality         Status                     ---------                               -----------         ------                     CBC Auto Differential[[id number]]        Abnormal            Final result                 Please view results for these tests on the individual orders.   GREEN TOP   LAVENDER TOP   GOLD TOP - SST   LIGHT BLUE TOP       EKG:   ECG 12 Lead ED Triage Standing Order; SOA   Preliminary Result   HEART RATE= 68  bpm   RR Interval= 882  ms   MD Interval= 183  ms   P Horizontal Axis= 91  deg   P Front Axis= -23  deg   QRSD Interval= 83  ms   QT Interval= 397  ms   QRS Axis= 36  deg   T Wave Axis= 59  deg   - OTHERWISE NORMAL ECG -   Sinus rhythm   Low voltage, precordial leads   Electronically Signed By:    Date and Time of Study: 2022-11-13 15:01:52          Meds given in ED:   Medications   sodium chloride 0.9 % flush 10 mL (has no administration in time range)   ipratropium-albuterol (DUO-NEB) nebulizer solution 3 mL (3 mL Nebulization Given 11/13/22 1544)   albuterol (PROVENTIL) nebulizer solution 0.083% 2.5 mg/3mL (2.5 mg Nebulization Given 11/13/22 1545)   methylPREDNISolone sodium succinate (SOLU-Medrol) injection 125 mg (125 mg Intravenous Given 11/13/22 1544)       Imaging results:  No radiology results for the last day    Ambulatory status:   - assist x1/2    Social issues:   Social History     Socioeconomic History    Marital status: Single   Tobacco Use    Smoking status: Never    Smokeless tobacco: Never   Vaping Use    Vaping Use: Never used   Substance and Sexual Activity    Alcohol use: No     Comment: red wine occassional    Drug  use: No    Sexual activity: Defer       NIH Stroke Scale:         Kaiden Martinez RN  11/13/22 18:09 EST

## 2022-11-13 NOTE — ED PROVIDER NOTES
EMERGENCY DEPARTMENT ENCOUNTER    CHIEF COMPLAINT  Chief Complaint: Shortness of air  History given by: Patient  History limited by: Nothing  Room Number: 37/37  PMD: Erich Aviles MD  Pulmonologist: Dr. Katharina Salter  Gastroenterologist: Dr. Mattson    HPI:  Pt is a 82 y.o. male presents complaining of worsening shortness of air over the past 2 days.  Patient reports a cough, nonproductive, denies chest pain, fever, abdominal pain, nausea/vomiting, but does report some swelling of bilateral lower extremities.  Patient reports he has been using his inhalers and nebulizer treatments at home, on his usual 2 L without relief.  Patient reports that shortness of air is worse with exertion, denies orthopnea.     Duration: 2 days  Associated Symptoms: Cough, leg swelling  Aggravating Factors: Exertion  Alleviating Factors: Nothing  Treatment before arrival: Regular meds, EMS transport    Upon review the patient's chart is noted:  Patient with history of chronic back pain, COPD not oxygen dependent in May 2022  Patient admitted from with rectal bleeding in September 2022, had an EGD and colonoscopy by Dr. Bruce Black that showed bleeding esophagitis, angioectasias of rectum, radiation proctitis, diverticulosis    PAST MEDICAL HISTORY  Active Ambulatory Problems     Diagnosis Date Noted   • Thrush, oral 03/11/2016   • ADD (attention deficit disorder) 03/11/2016   • Hemorrhoids 03/11/2016   • Bronchiectasis with acute lower respiratory infection (HCC) 03/11/2016   • Diverticul disease small and large intestine, no perforati or abscess 03/11/2016   • Benign non-nodular prostatic hyperplasia without lower urinary tract symptoms 03/11/2016   • Gastroesophageal reflux disease 03/11/2016   • Malaise and fatigue 03/11/2016   • Environmental allergies 04/25/2016   • Hemoptysis 04/27/2016   • Dyslipidemia 05/11/2016   • Primary osteoarthritis involving multiple joints 05/11/2016   • Pneumonia of right lower lobe due to  infectious organism 10/03/2016   • Abnormal EKG 10/04/2016   • COPD (chronic obstructive pulmonary disease) ([facility]) 10/04/2016   • Mild malnutrition ([facility]) 03/28/2017   • Acute on chronic respiratory failure with hypoxia ([facility]) 04/27/2017   • Fracture, intertrochanteric, right femur, closed, initial encounter ([facility]) 01/16/2018   • Chronic diastolic CHF (congestive heart failure) ([facility]) 01/16/2018   • Hematoma of frontal scalp 01/16/2018   • Postoperative anemia due to acute blood loss 01/19/2018   • Urinary retention 01/25/2018   • Hemorrhagic disorder due to circulating anticoagulants ([facility]) 01/29/2018   • History of fracture of right hip 02/22/2018   • Closed fracture of right hip with routine healing 02/28/2018   • Chronic low back pain with sciatica 06/21/2018   • Change in bowel function 04/18/2019   • Rectal bleeding 04/18/2019   • Prostate cancer ([facility]) 04/22/2019   • On home oxygen therapy 09/24/2019   • Acute viral bronchitis 12/29/2019   • Chronic diastolic (congestive) heart failure ([facility]) 10/14/2020   • Peripheral neuropathy 07/01/2021   • Plantar fasciitis 09/08/2021   • COPD exacerbation ([facility]) 05/07/2022   • Bilateral lower extremity edema 05/07/2022   • Microscopic hematuria 05/08/2022   • Acute midline low back pain with right-sided sciatica 08/01/2022   • Spinal stenosis, lumbar region with neurogenic claudication 08/02/2022   • Compression deformity of vertebra 08/02/2022   • Rectal bleed 09/23/2022   • Esophagitis 09/24/2022   • Angiectasia 09/27/2022   • Hiatal hernia 09/27/2022     Resolved Ambulatory Problems     Diagnosis Date Noted   • Pneumonia of both lower lobes due to infectious organism 03/11/2016   • Pneumonia of right upper lobe due to infectious organism 04/22/2016   • COPD exacerbation ([facility]) 04/25/2016   • GERD (gastroesophageal reflux disease) 04/25/2016   • Chronic respiratory failure with hypoxia ([facility]) 10/04/2016   • Bacterial lobar pneumonia 12/27/2016   • Pneumonia of left lower lobe due  to infectious organism 03/26/2017   • Bronchitis 06/01/2017   • COPD exacerbation (HCC) 06/17/2017   • Leukocytosis 01/16/2018   • Right upper lobe pneumonia 01/20/2018   • Mucus plugging of bronchi 01/16/2018   • COPD exacerbation (HCC) 04/16/2018   • Degenerative joint disease (DJD) of hip 09/23/2019   • Bronchiectasis with (acute) exacerbation (HCC) 12/28/2019   • Primary hypertension 05/06/2022     Past Medical History:   Diagnosis Date   • ADHD (attention deficit hyperactivity disorder)    • Bronchiectasis (HCC)    • Colon polyp    • Diverticulosis    • Hard of hearing    • Pneumonia        PAST SURGICAL HISTORY  Past Surgical History:   Procedure Laterality Date   • BRONCHOSCOPY N/A 4/30/2016    Procedure: BRONCHOSCOPY with BAL of right lower lobe and left  lower lobe.  ;  Surgeon: Nando Diaz MD;  Location: [facility] ENDOSCOPY;  Service:    • BRONCHOSCOPY N/A 4/28/2017    Procedure: BRONCHOSCOPY;  Surgeon: Katharina Salter MD;  Location: [facility] ENDOSCOPY;  Service:    • BRONCHOSCOPY Bilateral 6/29/2017    Procedure: BRONCHOSCOPY WITH WASHINGS;  Surgeon: Katharina Salter MD;  Location: [facility] ENDOSCOPY;  Service:    • BRONCHOSCOPY N/A 1/22/2018    Procedure: BRONCHOSCOPY AT BEDSIDE with BAL;  Surgeon: Katharina Salter MD;  Location: [facility] ENDOSCOPY;  Service:    • BRONCHOSCOPY N/A 1/30/2018    Procedure: BRONCHOSCOPY with BAL and washing;  Surgeon: Shreyas Wild MD;  Location: [facility] ENDOSCOPY;  Service:    • BRONCHOSCOPY Bilateral 4/24/2018    Procedure: BRONCHOSCOPY WITH WASHING;  Surgeon: Katharina Salter MD;  Location: [facility] ENDOSCOPY;  Service: Pulmonary   • BRONCHOSCOPY N/A 12/28/2019    Procedure: BRONCHOSCOPY with bilateral washing;  Surgeon: Katharina Salter MD;  Location: [facility] ENDOSCOPY;  Service: Pulmonary   • COLONOSCOPY  05/17/2013    eh, ih, tort, sig tics   • COLONOSCOPY N/A 5/10/2019    Non-thrombosed external hemorrhoids found on perianal exam, Diverticulosis, Tortuous colon, One 5 mm polyp in  the mid ascending colon, IH. Path: Tubular adenoma.    • COLONOSCOPY N/A 9/24/2022    Procedure: COLONOSCOPY to cecum and TI with argon plasma coagulation.;  Surgeon: Bruce Black MD;  Location: [facility] ENDOSCOPY;  Service: Gastroenterology;  Laterality: N/A;  pre- GI bleeding  post- radiation proctitis, diverticulosis   • ENDOSCOPY  03/19/2015    z line irreg, hh   • ENDOSCOPY N/A 9/24/2022    Procedure: ESOPHAGOGASTRODUODENOSCOPY;  Surgeon: Bruce Black MD;  Location: [facility] ENDOSCOPY;  Service: Gastroenterology;  Laterality: N/A;  pre- GI bleeding  post- esophagitis, hiatal hernia   • HIP OPEN REDUCTION Right 1/17/2018    Procedure: HIP OPEN REDUCTION INTERNAL FIXATION WITH DYNAMIC HIP SCREW;  Surgeon: Les Black MD;  Location: [facility] MAIN OR;  Service:    • SPINE SURGERY     • TONSILLECTOMY     • TOTAL HIP ARTHROPLASTY REVISION Right 9/23/2019    Procedure: HIP  REVISION RIGHT;  Surgeon: Isauro Warner MD;  Location: [facility] MAIN OR;  Service: Orthopedics       FAMILY HISTORY  Family History   Problem Relation Age of Onset   • Thyroid disease Mother    • No Known Problems Father    • Malig Hyperthermia Neg Hx        SOCIAL HISTORY  Social History     Socioeconomic History   • Marital status: Single   Tobacco Use   • Smoking status: Never   • Smokeless tobacco: Never   Vaping Use   • Vaping Use: Never used   Substance and Sexual Activity   • Alcohol use: No     Comment: red wine occassional   • Drug use: No   • Sexual activity: Defer       ALLERGIES  Daliresp [roflumilast], Latex, and Sulfa antibiotics    REVIEW OF SYSTEMS  Review of Systems   Constitutional: Positive for activity change and appetite change. Negative for chills and fever.   HENT: Negative for sore throat and trouble swallowing.    Eyes: Negative for visual disturbance.   Respiratory: Positive for cough and shortness of breath.    Cardiovascular: Positive for leg swelling. Negative for chest pain.   Gastrointestinal: Negative for  abdominal pain, diarrhea and vomiting.   Endocrine: Negative.    Genitourinary: Negative for decreased urine volume and frequency.   Musculoskeletal: Negative for neck pain.   Skin: Negative for rash.   Allergic/Immunologic: Negative.    Neurological: Negative for weakness and numbness.   Hematological: Negative.    Psychiatric/Behavioral: Negative.    All other systems reviewed and are negative.      PHYSICAL EXAM  ED Triage Vitals [11/13/22 1428]   Temp Heart Rate Resp BP SpO2   98.3 °F (36.8 °C) 76 18 -- 94 %      Temp src Heart Rate Source Patient Position BP Location FiO2 (%)   Tympanic Monitor -- -- --       Physical Exam  Vitals and nursing note reviewed.   Constitutional:       General: He is in acute distress.   HENT:      Head: Normocephalic and atraumatic.   Eyes:      Extraocular Movements: EOM normal.   Cardiovascular:      Rate and Rhythm: Normal rate and regular rhythm.      Pulses:           Posterior tibial pulses are 2+ on the right side and 2+ on the left side.      Heart sounds: Normal heart sounds. No murmur heard.  Pulmonary:      Effort: Tachypnea, accessory muscle usage and respiratory distress present.      Breath sounds: Examination of the right-middle field reveals decreased breath sounds and rhonchi. Examination of the left-middle field reveals decreased breath sounds and rhonchi. Examination of the right-lower field reveals decreased breath sounds and rhonchi. Examination of the left-lower field reveals decreased breath sounds and rhonchi. Decreased breath sounds, wheezing and rhonchi present.   Abdominal:      General: Bowel sounds are normal.      Palpations: Abdomen is soft.      Tenderness: There is no abdominal tenderness. There is no guarding or rebound.   Musculoskeletal:         General: Normal range of motion.      Cervical back: Normal range of motion.      Right lower leg: Edema (1+) present.      Left lower leg: Edema (1+) present.   Skin:     General: Skin is warm and dry.       Capillary Refill: Capillary refill takes less than 2 seconds.   Neurological:      General: No focal deficit present.      Mental Status: He is alert and oriented to person, place, and time.   Psychiatric:         Mood and Affect: Mood and affect normal.         LAB RESULTS  Lab Results (last 24 hours)     Procedure Component Value Units Date/Time    CBC & Differential [[id number]]  (Abnormal) Collected: 11/13/22 1437    Specimen: Blood Updated: 11/13/22 1446    Narrative:      The following orders were created for panel order CBC & Differential.  Procedure                               Abnormality         Status                     ---------                               -----------         ------                     CBC Auto Differential[[id number]]        Abnormal            Final result                 Please view results for these tests on the individual orders.    Comprehensive Metabolic Panel [[id number]]  (Abnormal) Collected: 11/13/22 1437    Specimen: Blood Updated: 11/13/22 1516     Glucose 98 mg/dL      BUN 28 mg/dL      Creatinine 1.18 mg/dL      Sodium 142 mmol/L      Potassium 4.8 mmol/L      Comment: Slight hemolysis detected by analyzer. Results may be affected.        Chloride 108 mmol/L      CO2 21.5 mmol/L      Calcium 9.0 mg/dL      Total Protein 6.4 g/dL      Albumin 3.80 g/dL      ALT (SGPT) 18 U/L      AST (SGOT) 27 U/L      Comment: Slight hemolysis detected by analyzer. Results may be affected.        Alkaline Phosphatase 86 U/L      Total Bilirubin 0.4 mg/dL      Globulin 2.6 gm/dL      A/G Ratio 1.5 g/dL      BUN/Creatinine Ratio 23.7     Anion Gap 12.5 mmol/L      eGFR 60.8 mL/min/1.73      Comment: National Kidney Foundation and American Society of Nephrology (ASN) Task Force recommended calculation based on the Chronic Kidney Disease Epidemiology Collaboration (CKD-EPI) equation refit without adjustment for race.       Narrative:      GFR Normal >60  Chronic Kidney Disease  <60  Kidney Failure <15    The GFR formula is only valid for adults with stable renal function between ages 18 and 70.    BNP [[id number]]  (Normal) Collected: 11/13/22 1437    Specimen: Blood Updated: 11/13/22 1512     proBNP 457.0 pg/mL     Narrative:      Among patients with dyspnea, NT-proBNP is highly sensitive for the detection of acute congestive heart failure. In addition NT-proBNP of <300 pg/ml effectively rules out acute congestive heart failure with 99% negative predictive value.    Results may be falsely decreased if patient taking Biotin.      Troponin [[id number]]  (Normal) Collected: 11/13/22 1437    Specimen: Blood Updated: 11/13/22 1514     Troponin T <0.010 ng/mL     Narrative:      Troponin T Reference Range:  <= 0.03 ng/mL-   Negative for AMI  >0.03 ng/mL-     Abnormal for myocardial necrosis.  Clinicians would have to utilize clinical acumen, EKG, Troponin and serial changes to determine if it is an Acute Myocardial Infarction or myocardial injury due to an underlying chronic condition.       Results may be falsely decreased if patient taking Biotin.      CBC Auto Differential [[id number]]  (Abnormal) Collected: 11/13/22 1437    Specimen: Blood Updated: 11/13/22 1446     WBC 8.69 10*3/mm3      RBC 4.02 10*6/mm3      Hemoglobin 13.2 g/dL      Hematocrit 39.8 %      MCV 99.0 fL      MCH 32.8 pg      MCHC 33.2 g/dL      RDW 11.9 %      RDW-SD 43.9 fl      MPV 10.0 fL      Platelets 205 10*3/mm3      Neutrophil % 44.6 %      Lymphocyte % 30.0 %      Monocyte % 8.7 %      Eosinophil % 15.7 %      Basophil % 0.9 %      Immature Grans % 0.1 %      Neutrophils, Absolute 3.87 10*3/mm3      Lymphocytes, Absolute 2.61 10*3/mm3      Monocytes, Absolute 0.76 10*3/mm3      Eosinophils, Absolute 1.36 10*3/mm3      Basophils, Absolute 0.08 10*3/mm3      Immature Grans, Absolute 0.01 10*3/mm3      nRBC 0.0 /100 WBC     Respiratory Panel PCR w/COVID-19(SARS-CoV-2) JARRETT/AMPARO/ROYAL/PAD/COR/MAD/JERMAIN In-House, NP Swab in  UTM/VTM, 3-4 HR TAT - Swab, Nasopharynx [[id number]]  (Normal) Collected: 11/13/22 1546    Specimen: Swab from Nasopharynx Updated: 11/13/22 1649     ADENOVIRUS, PCR Not Detected     Coronavirus 229E Not Detected     Coronavirus HKU1 Not Detected     Coronavirus NL63 Not Detected     Coronavirus OC43 Not Detected     COVID19 Not Detected     Human Metapneumovirus Not Detected     Human Rhinovirus/Enterovirus Not Detected     Influenza A PCR Not Detected     Influenza B PCR Not Detected     Parainfluenza Virus 1 Not Detected     Parainfluenza Virus 2 Not Detected     Parainfluenza Virus 3 Not Detected     Parainfluenza Virus 4 Not Detected     RSV, PCR Not Detected     Bordetella pertussis pcr Not Detected     Bordetella parapertussis PCR Not Detected     Chlamydophila pneumoniae PCR Not Detected     Mycoplasma pneumo by PCR Not Detected    Narrative:      In the setting of a positive respiratory panel with a viral infection PLUS a negative procalcitonin without other underlying concern for bacterial infection, consider observing off antibiotics or discontinuation of antibiotics and continue supportive care. If the respiratory panel is positive for atypical bacterial infection (Bordetella pertussis, Chlamydophila pneumoniae, or Mycoplasma pneumoniae), consider antibiotic de-escalation to target atypical bacterial infection.    POC Occult Blood Stool [[id number]]  (Normal) Collected: 11/13/22 1707    Specimen: Stool from Per Rectum Updated: 11/13/22 1708     Fecal Occult Blood Negative     Lot Number [id number]     Expiration Date 02/20/2023     Positive Control Positive     Negative Control Negative          I ordered the above labs and reviewed the results    RADIOLOGY  XR Chest 2 View   Final Result           I ordered the above noted radiological studies. Interpreted by radiologist. Viewed by me in PACS.       PROCEDURES  Procedures      PROGRESS AND CONSULTS  ED Course as of 11/13/22 1755   Sun Nov 13, 2022   1708 Patient  seen and reexamined, tolerating p.o. well, reports his breathing is much improved, aware his hemoglobin is normal, reports he is concerned about blood in his stool intermittently over the past several weeks.  Rectal exam benign, no obvious blood, brown stool, heme-negative [TO]   1725 Patient with marked dyspnea with standing in the room, oxygenation dropped to 87% on 2 L with walking a few steps [TO]   1753 Discussed with Dr. Frye, on-call for A who is aware of patient's presentation, imaging, labs and agrees to admit for further testing, treatment as needed. [TO]      ED Course User Index  [TO] Diana Aldana MD     EKG          EKG time: 1501  Rhythm/Rate: Sinus rhythm, rate in the 60s  P waves and PA: Normal P waves, normal HARSHA's  QRS, axis: Unremarkable  ST and T waves: Unremarkable    Interpreted Contemporaneously by me, independently viewed  unchanged compared to prior 5/7/2020        MEDICAL DECISION MAKING  Results were reviewed/discussed with the patient and they were also made aware of online access. Pt also made aware that some labs, such as cultures, will not be resulted during ER visit and followup with PMD is necessary.       MDM       DIAGNOSIS  Final diagnoses:   Acute exacerbation of chronic obstructive pulmonary disease (COPD) (HCC)   History of rectal bleeding       DISPOSITION  ADMISSION    Discussed treatment plan and reason for admission with pt/family and admitting physician.  Pt/family voiced understanding of the plan for admission for further testing/treatment as needed.       Latest Documented Vital Signs:  As of 17:54 EST  BP- 148/89 HR- 84 Temp- 98.3 °F (36.8 °C) (Tympanic) O2 sat- (!) 87%    --  Patient was wearing facemask when I entered the room and throughout our encounter. Full protective equipment was worn throughout this patient encounter including a face mask, eye protection and gloves. Hand hygiene was performed before donning protective equipment and after removal when  leaving the room.      Diana Aldana MD  11/13/22 0233

## 2022-11-14 PROBLEM — E66.3 OVERWEIGHT (BMI 25.0-29.9): Status: ACTIVE | Noted: 2022-11-14

## 2022-11-14 LAB
ALBUMIN SERPL-MCNC: 3.9 G/DL (ref 3.5–5.2)
ALBUMIN/GLOB SERPL: 1.9 G/DL
ALP SERPL-CCNC: 75 U/L (ref 39–117)
ALT SERPL W P-5'-P-CCNC: 15 U/L (ref 1–41)
ANION GAP SERPL CALCULATED.3IONS-SCNC: 14.2 MMOL/L (ref 5–15)
AST SERPL-CCNC: 18 U/L (ref 1–40)
BASOPHILS # BLD AUTO: 0 10*3/MM3 (ref 0–0.2)
BASOPHILS NFR BLD AUTO: 0 % (ref 0–1.5)
BILIRUB SERPL-MCNC: 0.3 MG/DL (ref 0–1.2)
BUN SERPL-MCNC: 24 MG/DL (ref 8–23)
BUN/CREAT SERPL: 21.8 (ref 7–25)
CALCIUM SPEC-SCNC: 9.4 MG/DL (ref 8.6–10.5)
CHLORIDE SERPL-SCNC: 104 MMOL/L (ref 98–107)
CO2 SERPL-SCNC: 21.8 MMOL/L (ref 22–29)
CREAT SERPL-MCNC: 1.1 MG/DL (ref 0.76–1.27)
DEPRECATED RDW RBC AUTO: 41 FL (ref 37–54)
EGFRCR SERPLBLD CKD-EPI 2021: 66.2 ML/MIN/1.73
EOSINOPHIL # BLD AUTO: 0.01 10*3/MM3 (ref 0–0.4)
EOSINOPHIL NFR BLD AUTO: 0.2 % (ref 0.3–6.2)
ERYTHROCYTE [DISTWIDTH] IN BLOOD BY AUTOMATED COUNT: 11.7 % (ref 12.3–15.4)
GLOBULIN UR ELPH-MCNC: 2.1 GM/DL
GLUCOSE BLDC GLUCOMTR-MCNC: 128 MG/DL (ref 70–130)
GLUCOSE BLDC GLUCOMTR-MCNC: 164 MG/DL (ref 70–130)
GLUCOSE BLDC GLUCOMTR-MCNC: 165 MG/DL (ref 70–130)
GLUCOSE SERPL-MCNC: 233 MG/DL (ref 65–99)
HCT VFR BLD AUTO: 37.8 % (ref 37.5–51)
HGB BLD-MCNC: 12.8 G/DL (ref 13–17.7)
LYMPHOCYTES # BLD AUTO: 0.63 10*3/MM3 (ref 0.7–3.1)
LYMPHOCYTES NFR BLD AUTO: 11.5 % (ref 19.6–45.3)
MCH RBC QN AUTO: 32.6 PG (ref 26.6–33)
MCHC RBC AUTO-ENTMCNC: 33.9 G/DL (ref 31.5–35.7)
MCV RBC AUTO: 96.2 FL (ref 79–97)
MONOCYTES # BLD AUTO: 0.03 10*3/MM3 (ref 0.1–0.9)
MONOCYTES NFR BLD AUTO: 0.5 % (ref 5–12)
NEUTROPHILS NFR BLD AUTO: 4.79 10*3/MM3 (ref 1.7–7)
NEUTROPHILS NFR BLD AUTO: 87.4 % (ref 42.7–76)
PLATELET # BLD AUTO: 203 10*3/MM3 (ref 140–450)
PMV BLD AUTO: 10.9 FL (ref 6–12)
POTASSIUM SERPL-SCNC: 3.8 MMOL/L (ref 3.5–5.2)
PROT SERPL-MCNC: 6 G/DL (ref 6–8.5)
RBC # BLD AUTO: 3.93 10*6/MM3 (ref 4.14–5.8)
SODIUM SERPL-SCNC: 140 MMOL/L (ref 136–145)
WBC NRBC COR # BLD: 5.48 10*3/MM3 (ref 3.4–10.8)

## 2022-11-14 PROCEDURE — 25010000002 FUROSEMIDE PER 20 MG: Performed by: INTERNAL MEDICINE

## 2022-11-14 PROCEDURE — 80053 COMPREHEN METABOLIC PANEL: CPT | Performed by: INTERNAL MEDICINE

## 2022-11-14 PROCEDURE — 85007 BL SMEAR W/DIFF WBC COUNT: CPT | Performed by: INTERNAL MEDICINE

## 2022-11-14 PROCEDURE — 94799 UNLISTED PULMONARY SVC/PX: CPT

## 2022-11-14 PROCEDURE — 63710000001 PREDNISONE PER 1 MG: Performed by: STUDENT IN AN ORGANIZED HEALTH CARE EDUCATION/TRAINING PROGRAM

## 2022-11-14 PROCEDURE — 63710000001 INSULIN LISPRO (HUMAN) PER 5 UNITS: Performed by: STUDENT IN AN ORGANIZED HEALTH CARE EDUCATION/TRAINING PROGRAM

## 2022-11-14 PROCEDURE — 97161 PT EVAL LOW COMPLEX 20 MIN: CPT

## 2022-11-14 PROCEDURE — 97110 THERAPEUTIC EXERCISES: CPT

## 2022-11-14 PROCEDURE — 82962 GLUCOSE BLOOD TEST: CPT

## 2022-11-14 PROCEDURE — 25010000002 METHYLPREDNISOLONE PER 125 MG: Performed by: INTERNAL MEDICINE

## 2022-11-14 PROCEDURE — 94664 DEMO&/EVAL PT USE INHALER: CPT

## 2022-11-14 PROCEDURE — 85025 COMPLETE CBC W/AUTO DIFF WBC: CPT | Performed by: INTERNAL MEDICINE

## 2022-11-14 RX ORDER — ALBUTEROL SULFATE 2.5 MG/3ML
2.5 SOLUTION RESPIRATORY (INHALATION) EVERY 4 HOURS PRN
Status: DISCONTINUED | OUTPATIENT
Start: 2022-11-14 | End: 2022-11-19 | Stop reason: HOSPADM

## 2022-11-14 RX ORDER — IPRATROPIUM BROMIDE AND ALBUTEROL SULFATE 2.5; .5 MG/3ML; MG/3ML
3 SOLUTION RESPIRATORY (INHALATION)
Status: DISCONTINUED | OUTPATIENT
Start: 2022-11-14 | End: 2022-11-19 | Stop reason: HOSPADM

## 2022-11-14 RX ORDER — BENZONATATE 100 MG/1
200 CAPSULE ORAL 3 TIMES DAILY PRN
Status: DISCONTINUED | OUTPATIENT
Start: 2022-11-14 | End: 2022-11-19 | Stop reason: HOSPADM

## 2022-11-14 RX ORDER — NICOTINE POLACRILEX 4 MG
15 LOZENGE BUCCAL
Status: DISCONTINUED | OUTPATIENT
Start: 2022-11-14 | End: 2022-11-19 | Stop reason: HOSPADM

## 2022-11-14 RX ORDER — DEXTROSE MONOHYDRATE 25 G/50ML
25 INJECTION, SOLUTION INTRAVENOUS
Status: DISCONTINUED | OUTPATIENT
Start: 2022-11-14 | End: 2022-11-19 | Stop reason: HOSPADM

## 2022-11-14 RX ORDER — PREDNISONE 20 MG/1
40 TABLET ORAL
Status: DISCONTINUED | OUTPATIENT
Start: 2022-11-14 | End: 2022-11-19 | Stop reason: HOSPADM

## 2022-11-14 RX ORDER — INSULIN LISPRO 100 [IU]/ML
0-7 INJECTION, SOLUTION INTRAVENOUS; SUBCUTANEOUS
Status: DISCONTINUED | OUTPATIENT
Start: 2022-11-14 | End: 2022-11-19 | Stop reason: HOSPADM

## 2022-11-14 RX ADMIN — FLUOXETINE HYDROCHLORIDE 20 MG: 20 CAPSULE ORAL at 08:22

## 2022-11-14 RX ADMIN — ROPINIROLE HYDROCHLORIDE 0.5 MG: 0.5 TABLET, FILM COATED ORAL at 20:16

## 2022-11-14 RX ADMIN — MIRABEGRON 25 MG: 25 TABLET, FILM COATED, EXTENDED RELEASE ORAL at 08:22

## 2022-11-14 RX ADMIN — FUROSEMIDE 40 MG: 40 TABLET ORAL at 08:22

## 2022-11-14 RX ADMIN — SUCRALFATE 1 G: 1 TABLET ORAL at 11:43

## 2022-11-14 RX ADMIN — METHYLPREDNISOLONE SODIUM SUCCINATE 60 MG: 125 INJECTION, POWDER, FOR SOLUTION INTRAMUSCULAR; INTRAVENOUS at 08:23

## 2022-11-14 RX ADMIN — INSULIN LISPRO 3 UNITS: 100 INJECTION, SOLUTION INTRAVENOUS; SUBCUTANEOUS at 08:27

## 2022-11-14 RX ADMIN — GABAPENTIN 300 MG: 300 CAPSULE ORAL at 16:44

## 2022-11-14 RX ADMIN — Medication 10 ML: at 08:23

## 2022-11-14 RX ADMIN — PREDNISONE 40 MG: 20 TABLET ORAL at 16:45

## 2022-11-14 RX ADMIN — Medication 10 ML: at 20:17

## 2022-11-14 RX ADMIN — INSULIN LISPRO 2 UNITS: 100 INJECTION, SOLUTION INTRAVENOUS; SUBCUTANEOUS at 17:03

## 2022-11-14 RX ADMIN — IPRATROPIUM BROMIDE AND ALBUTEROL SULFATE 3 ML: 2.5; .5 SOLUTION RESPIRATORY (INHALATION) at 12:51

## 2022-11-14 RX ADMIN — SUCRALFATE 1 G: 1 TABLET ORAL at 16:45

## 2022-11-14 RX ADMIN — PANTOPRAZOLE SODIUM 40 MG: 40 TABLET, DELAYED RELEASE ORAL at 08:22

## 2022-11-14 RX ADMIN — METHYLPHENIDATE HYDROCHLORIDE 10 MG: 5 TABLET ORAL at 08:22

## 2022-11-14 RX ADMIN — PANTOPRAZOLE SODIUM 40 MG: 40 TABLET, DELAYED RELEASE ORAL at 20:16

## 2022-11-14 RX ADMIN — GABAPENTIN 300 MG: 300 CAPSULE ORAL at 20:16

## 2022-11-14 RX ADMIN — ROPINIROLE HYDROCHLORIDE 0.5 MG: 0.5 TABLET, FILM COATED ORAL at 00:19

## 2022-11-14 RX ADMIN — IPRATROPIUM BROMIDE AND ALBUTEROL SULFATE 3 ML: 2.5; .5 SOLUTION RESPIRATORY (INHALATION) at 18:40

## 2022-11-14 RX ADMIN — SUCRALFATE 1 G: 1 TABLET ORAL at 08:22

## 2022-11-14 RX ADMIN — POLYETHYLENE GLYCOL 3350 17 G: 17 POWDER, FOR SOLUTION ORAL at 08:22

## 2022-11-14 RX ADMIN — SUCRALFATE 1 G: 1 TABLET ORAL at 20:16

## 2022-11-14 RX ADMIN — SUCRALFATE 1 G: 1 TABLET ORAL at 00:19

## 2022-11-14 RX ADMIN — GABAPENTIN 300 MG: 300 CAPSULE ORAL at 08:22

## 2022-11-14 NOTE — H&P
Internal medicine history and physical  INTERNAL MEDICINE   [facility]       Patient Identification:  Name: Tony Leonard  Age: 82 y.o.  Sex: male  :  1940  MRN: [id number]                   Primary Care Physician: Erich Aviles MD                               Date of admission:2022    Chief Complaint: Progressive shortness of breath and weakness since Thursday, noticing bright red blood on the tissue paper started couple days ago but has had done it before and worsening back pain.    History of Present Illness:   Patient is 82-year-old hard of hearing male who has attention deficit disorder has chronic COPD with hypoxia on oxygen supplementation as well as history of hypertension and volume overload for which he was supposed to be on Lasix but does not take it because of his immobility and back pain causes him to have incontinence as he cannot get to the bathroom in a timely fashion.  He does uses urinal routinely.  In this background patient has not been taking his Lasix for the last few weeks and since Thursday he has been getting progressively short of breath.  Despite using inhalers and nebulizer treatment at home he is not getting any better.  His shortness of breath gets worse with activity.  Patient admits to have increasing swelling of the legs in the last week or so.  Patient was hospitalized recently for rectal bleeding and had GI evaluation consisting of EGD and colonoscopy and was noted to have diverticulosis, hemorrhoids, angiectasia of the rectum radiation proctitis and esophagitis.  Initial hemoglobin in the emergency room was noted to be 13.2.  Patient has negative rectal examination and his Hemoccult was negative in the emergency room.  Patient was given nebulizer treatment and was thought that he could be discharged but once he was ambulated his oxygen saturation dropped resulting in request for admission for COPD exacerbation.  Patient has preserved  appetite and currently hungry and wants to eat.  He denies any fever and chills.    Past Medical History:  Past Medical History:   Diagnosis Date   • ADHD (attention deficit hyperactivity disorder)    • Bronchiectasis (HCC)    • Colon polyp    • COPD (chronic obstructive pulmonary disease) (HCC)    • Diverticulosis    • Hard of hearing    • On home oxygen therapy     2 LITERS   • Pneumonia    • Prostate cancer ([facility]) 04/22/2019   • Spinal stenosis, lumbar region with neurogenic claudication 08/02/2022     Past Surgical History:  Past Surgical History:   Procedure Laterality Date   • BRONCHOSCOPY N/A 4/30/2016    Procedure: BRONCHOSCOPY with BAL of right lower lobe and left  lower lobe.  ;  Surgeon: Nando Diaz MD;  Location: [facility] ENDOSCOPY;  Service:    • BRONCHOSCOPY N/A 4/28/2017    Procedure: BRONCHOSCOPY;  Surgeon: Katharina Salter MD;  Location: [facility] ENDOSCOPY;  Service:    • BRONCHOSCOPY Bilateral 6/29/2017    Procedure: BRONCHOSCOPY WITH WASHINGS;  Surgeon: Katharina Salter MD;  Location: [facility] ENDOSCOPY;  Service:    • BRONCHOSCOPY N/A 1/22/2018    Procedure: BRONCHOSCOPY AT BEDSIDE with BAL;  Surgeon: Katharina Salter MD;  Location: [facility] ENDOSCOPY;  Service:    • BRONCHOSCOPY N/A 1/30/2018    Procedure: BRONCHOSCOPY with BAL and washing;  Surgeon: Shreyas Wild MD;  Location: [facility] ENDOSCOPY;  Service:    • BRONCHOSCOPY Bilateral 4/24/2018    Procedure: BRONCHOSCOPY WITH WASHING;  Surgeon: Katharina Salter MD;  Location: [facility] ENDOSCOPY;  Service: Pulmonary   • BRONCHOSCOPY N/A 12/28/2019    Procedure: BRONCHOSCOPY with bilateral washing;  Surgeon: Katharina Salter MD;  Location: [facility] ENDOSCOPY;  Service: Pulmonary   • COLONOSCOPY  05/17/2013    eh, ih, tort, sig tics   • COLONOSCOPY N/A 5/10/2019    Non-thrombosed external hemorrhoids found on perianal exam, Diverticulosis, Tortuous colon, One 5 mm polyp in the mid ascending colon, IH. Path: Tubular adenoma.    • COLONOSCOPY N/A 9/24/2022     Procedure: COLONOSCOPY to cecum and TI with argon plasma coagulation.;  Surgeon: Bruce Black MD;  [facility];  Service: Gastroenterology;  Laterality: N/A;  pre- GI bleeding  post- radiation proctitis, diverticulosis   • ENDOSCOPY  03/19/2015    z line irreg, hh   • ENDOSCOPY N/A 9/24/2022    Procedure: ESOPHAGOGASTRODUODENOSCOPY;  Surgeon: Bruce Black MD;  [facility];  Service: Gastroenterology;  Laterality: N/A;  pre- GI bleeding  post- esophagitis, hiatal hernia   • HIP OPEN REDUCTION Right 1/17/2018    Procedure: HIP OPEN REDUCTION INTERNAL FIXATION WITH DYNAMIC HIP SCREW;  Surgeon: Les Black MD;  Location: [facility] MAIN OR;  Service:    • SPINE SURGERY     • TONSILLECTOMY     • TOTAL HIP ARTHROPLASTY REVISION Right 9/23/2019    Procedure: HIP  REVISION RIGHT;  Surgeon: Isauro Warner MD;  Location: [facility] MAIN OR;  Service: Orthopedics      Home Meds:  Medications Prior to Admission   Medication Sig Dispense Refill Last Dose   • acetaminophen (TYLENOL) 500 MG tablet Take 500 mg by mouth Every 6 (Six) Hours As Needed for Mild Pain.      • albuterol (ACCUNEB) 1.25 MG/3ML nebulizer solution Inhale 1 ampule.      • albuterol (PROVENTIL) (2.5 MG/3ML) 0.083% nebulizer solution Take 2.5 mg by nebulization 4 (Four) Times a Day. (Patient taking differently: Take 2.5 mg by nebulization 2 (Two) Times a Day.) 360 mL 3    • Anoro Ellipta 62.5-25 MCG/INH aerosol powder  inhaler USE 1 INHALATION DAILY 180 each 3    • budesonide (PULMICORT) 0.5 MG/2ML nebulizer solution Take 2 mL by nebulization 2 (Two) Times a Day. 1 vial only twice daily 30 each 3    • calcium carbonate (TUMS) 500 MG chewable tablet Chew 1 tablet As Needed for Indigestion or Heartburn.      • Camphor-Menthol-Methyl Sal (Salonpas) 3.1-6-10 % patch Apply 3 % topically Every 12 (Twelve) Hours.      • colestipol (COLESTID) 5 g granules Take 5 g by mouth 2 (Two) Times a Day. Prn after diarrhea 500 g 3    •  FLUoxetine (PROzac) 20 MG capsule TAKE 1 CAPSULE DAILY 90 capsule 3    • furosemide (LASIX) 40 MG tablet Take 40 mg by mouth.      • gabapentin (NEURONTIN) 300 MG capsule Take 1 capsule by mouth 3 (Three) Times a Day. 9 capsule 0    • ketoconazole (NIZORAL) 2 % shampoo APPLY TOPICALLY TO THE APPROPRIATE AREA AS DIRECTED TWO TIMES A WEEK 120 mL 3    • methylphenidate (Ritalin) 10 MG tablet Take 1 tablet by mouth Daily. ADD 30 tablet 0    • Misc. Devices (Rollator Ultra-Light) misc 1 application Daily. Severe DJD 1 each 0    • Multiple Vitamins-Minerals (MULTIVITAMIN ADULT PO) Take 1 tablet by mouth Daily.      • Myrbetriq 25 MG tablet sustained-release 24 hour 24 hr tablet       • OXYGEN-HELIUM IN Inhale 2.5 L Daily As Needed.      • pantoprazole (PROTONIX) 40 MG EC tablet Take 1 tablet by mouth Daily. 90 tablet 3    • pantoprazole (Protonix) 40 MG EC tablet Take 1 tablet by mouth 2 (Two) Times a Day. 60 tablet 0    • polyethylene glycol 17 g packet DISSOLVE ONE PACKET IN 8OZ LIQUID & DRINK BY MOUTH DAILY 28 each 10    • potassium chloride (K-DUR,KLOR-CON) 20 MEQ CR tablet Take 1 tablet by mouth Daily.      • rOPINIRole (REQUIP) 0.5 MG tablet TAKE 1 TO 2 TABLETS EVERY NIGHT 1 HOUR BEFORE BEDTIME (Patient taking differently: 2 tablets.) 90 tablet 7    • sodium chloride 7 % nebulizer solution nebulizer solution       • sucralfate (Carafate) 1 GM/10ML suspension Take 10 mL by mouth 4 (Four) Times a Day. 1200 mL 0    • terbinafine (lamiSIL) 250 MG tablet       • TiZANidine (ZANAFLEX) 6 MG capsule TAKE 1 CAPSULE EVERY NIGHT 90 capsule 3    • Turmeric 500 MG capsule Take  by mouth Daily.      • vitamin B-12 (CYANOCOBALAMIN) 2500 MCG sublingual tablet tablet Take 2,500 mcg by mouth Every Evening.        Current Meds:     Current Facility-Administered Medications:   •  sodium chloride 0.9 % flush 10 mL, 10 mL, Intravenous, PRN, KatyaDiana cantrell MD  Allergies:  Allergies   Allergen Reactions   • Daliresp [Roflumilast]  "Anaphylaxis   • Latex Rash   • Sulfa Antibiotics Rash     Social History:   Social History     Tobacco Use   • Smoking status: Never   • Smokeless tobacco: Never   Substance Use Topics   • Alcohol use: No     Comment: red wine occassional      Family History:  Family History   Problem Relation Age of Onset   • Thyroid disease Mother    • No Known Problems Father    • Malig Hyperthermia Neg Hx           Review of Systems  See history of present illness and past medical history.    Constitutional: Positive for activity change and appetite change. Negative for chills and fever.   HENT: Negative for sore throat and trouble swallowing.    Eyes: Negative for visual disturbance.   Respiratory: Positive for cough and shortness of breath.    Cardiovascular: Positive for leg swelling. Negative for chest pain.   Gastrointestinal: Negative for abdominal pain, diarrhea and vomiting.   Endocrine: Negative.    Genitourinary: Negative for decreased urine volume and frequency.   Musculoskeletal: Negative for neck pain.   Skin: Negative for rash.   Allergic/Immunologic: Negative.    Neurological: Negative for weakness and numbness.   Hematological: Negative.    Psychiatric/Behavioral: Negative.    All other systems reviewed and are negative.      Vitals:   /59   Pulse 72   Temp 98.3 °F (36.8 °C) (Tympanic)   Resp 20   Ht 180.3 cm (71\")   Wt 81.6 kg (180 lb)   SpO2 98%   BMI 25.10 kg/m²   I/O: No intake or output data in the 24 hours ending 11/13/22 2018  Exam:  Patient is examined using the personal protective equipment as per guidelines from infection control for this particular patient as enacted.  Hand washing was performed before and after patient interaction.  General Appearance:    Alert, cooperative, no distress, appears stated age   Head:    Normocephalic, without obvious abnormality, atraumatic   Eyes:    PERRL, conjunctiva/corneas clear, EOM's intact, both eyes   Ears:    Normal external ear canals, both ears "   Nose:   Nares normal, septum midline, mucosa normal, no drainage    or sinus tenderness   Throat:   Lips, tongue, gums normal; oral mucosa pink and moist   Neck:   Supple, symmetrical, trachea midline, no adenopathy;     thyroid:  no enlargement/tenderness/nodules; no carotid    bruit or JVD   Back:     Symmetric, no curvature, ROM normal, no CVA tenderness   Lungs:    Bilateral rhonchi and increased crackles at the lung bases overall seem to be improved with no respiratory distress or use of accessory muscles.   Chest Wall:    No tenderness or deformity    Heart:   S1-S2 regular   Abdomen:    Soft nontender   Extremities:  Bilateral lower extremity 2+ edema noted   Pulses:   Pulses palpable in all extremities; symmetric all extremities   Skin:  Chronic skin changes involving the lower extremity noted   Neurologic:  Hard of hearing but grossly nonfocal except for back pain and arthritis related immobility.       Data Review:      I reviewed the patient's new clinical results.  Results from last 7 days   Lab Units 11/13/22  1437   WBC 10*3/mm3 8.69   HEMOGLOBIN g/dL 13.2   PLATELETS 10*3/mm3 205     Results from last 7 days   Lab Units 11/13/22  1437   SODIUM mmol/L 142   POTASSIUM mmol/L 4.8   CHLORIDE mmol/L 108*   CO2 mmol/L 21.5*   BUN mg/dL 28*   CREATININE mg/dL 1.18   CALCIUM mg/dL 9.0   GLUCOSE mg/dL 98     XR Spine Lumbar AP & Lateral    Result Date: 11/4/2022  Ordering physician's impression is located in the Encounter Note dated 11/04/22. X-ray performed in the DR room.     Microbiology Results (last 10 days)     Procedure Component Value - Date/Time    Respiratory Panel PCR w/COVID-19(SARS-CoV-2) JARRETT/AMPARO/ROYAL/PAD/COR/[facility] In-House, NP Swab in UTM/[facility], 3-4 HR TAT - Swab, Nasopharynx [[id number]]  (Normal) Collected: 11/13/22 1546    Lab Status: Final result Specimen: Swab from Nasopharynx Updated: 11/13/22 1649     ADENOVIRUS, PCR Not Detected     Coronavirus 229E Not Detected     Coronavirus HKU1 Not  Detected     Coronavirus NL63 Not Detected     Coronavirus OC43 Not Detected     COVID19 Not Detected     Human Metapneumovirus Not Detected     Human Rhinovirus/Enterovirus Not Detected     Influenza A PCR Not Detected     Influenza B PCR Not Detected     Parainfluenza Virus 1 Not Detected     Parainfluenza Virus 2 Not Detected     Parainfluenza Virus 3 Not Detected     Parainfluenza Virus 4 Not Detected     RSV, PCR Not Detected     Bordetella pertussis pcr Not Detected     Bordetella parapertussis PCR Not Detected     Chlamydophila pneumoniae PCR Not Detected     Mycoplasma pneumo by PCR Not Detected    Narrative:      In the setting of a positive respiratory panel with a viral infection PLUS a negative procalcitonin without other underlying concern for bacterial infection, consider observing off antibiotics or discontinuation of antibiotics and continue supportive care. If the respiratory panel is positive for atypical bacterial infection (Bordetella pertussis, Chlamydophila pneumoniae, or Mycoplasma pneumoniae), consider antibiotic de-escalation to target atypical bacterial infection.        Troponin less than 0.010  ECG 12 Lead ED Triage Standing Order; SOA   Final Result   HEART RATE= 68  bpm   RR Interval= 882  ms   FL Interval= 183  ms   P Horizontal Axis= 91  deg   P Front Axis= -23  deg   QRSD Interval= 83  ms   QT Interval= 397  ms   QRS Axis= 36  deg   T Wave Axis= 59  deg   - OTHERWISE NORMAL ECG -   Sinus rhythm   Low voltage, precordial leads   No change from previous tracing   Electronically Signed By: Davian AnsariINOCENCIA) ([facility]) 13-Nov-2022 18:21:40   Date and Time of Study: 2022-11-13 15:01:52            Assessment:  Active Hospital Problems    Diagnosis  POA   • **Acute exacerbation of chronic obstructive pulmonary disease (COPD) ([facility]) [J44.1]  Yes   • Hiatal hernia [K44.9]  Yes   • Rectal bleed [K62.5]  Yes   • Spinal stenosis, lumbar region with neurogenic claudication [M48.062]  Yes   •  Peripheral neuropathy [G62.9]  Yes   • Chronic diastolic (congestive) heart failure (HCC) [I50.32]  Yes   • Prostate cancer (HCC) [C61]  Yes   • ADD (attention deficit disorder) [F98.8]  Yes   • Hemorrhoids [K64.9]  Yes       Plan: See admission orders  Continue with steroids and nebulizer treatment with periodic assessment for resolution of rhonchi to discontinue systemic steroids.  Continuous pulse ox monitoring  IV Lasix for chronic diastolic congestive heart failure even though his BNP is normal but he has significant volume overload  Check serial H&H  Continue his regimen for spinal stenosis and low back pain  Further management as his condition evolves.  Cardiology consultation if needed.    Danni Frye MD   11/13/2022  20:18 EST    Parts of this note may be an electronic transcription/translation of spoken language to printed text using the Dragon dictation system.

## 2022-11-14 NOTE — PLAN OF CARE
Goal Outcome Evaluation:  Plan of Care Reviewed With: patient        Progress: no change  Outcome Evaluation: c/o SOA with exertion. 2L O2 via NC. pt reports only wearing 2L O2 via NC while asleep at home. Up with assistance with walker on wheels from home. Urinal provided. IV lasix given per MD order due to BLE edema. No BM overnight. Covid negative.

## 2022-11-14 NOTE — PLAN OF CARE
Goal Outcome Evaluation:  Plan of Care Reviewed With: patient        Progress: improving     Patient alert and oriented. He is currently on room air with sats noted at 94%. He worked with therapy today. No distress noted. By mouth steroids started this shift. Fall and latex precautions maintained. Call light in reach. Bed alarm on.

## 2022-11-14 NOTE — CASE MANAGEMENT/SOCIAL WORK
Discharge Planning Assessment  [facility]     Patient Name: Tony Leonard  MRN: [id number]  Today's Date: 11/14/2022    Admit Date: 11/13/2022    Plan: Pending PT eval - plan at present is home alone with HH.  Referral pending to [facility] .   Discharge Needs Assessment     Row Name 11/14/22 1247       Living Environment    People in Home alone    Current Living Arrangements apartment    Primary Care Provided by self    Provides Primary Care For no one, unable/limited ability to care for self    Family Caregiver if Needed child(brian), adult    Quality of Family Relationships supportive    Able to Return to Prior Arrangements yes       Resource/Environmental Concerns    Resource/Environmental Concerns none       Transition Planning    Patient/Family Anticipates Transition to home with help/services    Patient/Family Anticipated Services at Transition home health care    Transportation Anticipated family or friend will provide;car, drives self       Discharge Needs Assessment    Readmission Within the Last 30 Days no previous admission in last 30 days    Equipment Currently Used at Home walker, rolling;nebulizer;oxygen;cane, straight;shower chair;grab bar    Concerns to be Addressed discharge planning    Equipment Needed After Discharge none    Provided Post Acute Provider List? N/A    Provided Post Acute Provider Quality & Resource List? N/A               Discharge Plan     Row Name 11/14/22 1249       Plan    Plan Pending PT eval - plan at present is home alone with HH.  Referral pending to [facility] .    Plan Comments S/W pt at bedside.  Facesheet and pharmacy info confirmed.  Pt lives alone in an apartment at [facility].  Home DME includs a cane, walker, O2 ([place]), nebulizer, shower chair and grab bars in the bathroom.  Pt is IADLs and can drive. He states his sons check on him occasionally but all are busy with work.  He has used [facility] in the past and has been to [facility] for rehab.  PT  eval is pending at this time, but currently pt plans to return home upon DC.  He would like to use [facility] HH again.  Referral called to [facility]/ [facility] - hiren pending. Pt did enroll in Meds to Bed.  CCP will followup after PT and assist as needed. ..........Sarita MINA/ SULEIMAN              Continued Care and Services - Admitted Since 11/13/2022     Home Medical Care     Service Provider Request Status Selected Services Address Phone Fax Patient Preferred    [facility] Pending - Request Sent N/A [address] [place] [address] [phone] --                 Demographic Summary     Row Name 11/14/22 1245       General Information    Admission Type inpatient    Arrived From emergency department;home    Referral Source admission list    Reason for Consult discharge planning    Preferred Language English               Functional Status     Row Name 11/14/22 1245       Functional Status    Usual Activity Tolerance moderate    Current Activity Tolerance moderate       Functional Status, IADL    Medications independent    Meal Preparation independent    Housekeeping independent    Laundry independent    Shopping independent       Mental Status    General Appearance WDL WDL       Mental Status Summary    Recent Changes in Mental Status/Cognitive Functioning no changes       Employment/    Employment Status retired                         Sarita Roth RN

## 2022-11-14 NOTE — PLAN OF CARE
Goal Outcome Evaluation:    Patient is a 82 y.o. male admitted to [facility] for acute exacerbation of COPD on 11/13/2022. Today, patient required SV for bed mobility, min-modA for STS transfers, and SBA for ambulation with his personal RW for 150 ft. No LOB or veering noted with ambulation. Discussed assist level for transfers with patient- patient reported this is baseline due to spinal history and requires up to 20 minutes sometimes for standing. Patient did deny fall history. Patient denies any questions or concerns for PT at this time. No further acute skilled PT needs- recommend home and possible HH PT if necessary.

## 2022-11-14 NOTE — PROGRESS NOTES
Name: Tony Leonard ADMIT: 2022   : 1940  PCP: Erich Aviles MD    MRN: [id number] LOS: 0 days   AGE/SEX: 82 y.o. male  ROOM: [facility]     Subjective   Subjective   Patient seen and examined this morning. Hospital day 1.  He is doing much better overall, 1 yesterday.  Breathing improved, continues to endorse mild nonproductive cough, however, stable.  Otherwise no acute complaints.  Currently 2 L O2 via NC with O2 sat >90% (home oxygen requirements).      Review of Systems   Constitutional: Negative for chills and fever.   Respiratory: Positive for cough. Negative for shortness of breath.    Cardiovascular: Positive for leg swelling (improved). Negative for chest pain and palpitations.   Gastrointestinal: Negative for abdominal distention and abdominal pain.   Genitourinary: Negative for dysuria and hematuria.   Musculoskeletal: Negative for arthralgias and myalgias.   Neurological: Negative for dizziness and light-headedness.        Objective   Objective   Vital Signs  Temp:  [97.4 °F (36.3 °C)-98.3 °F (36.8 °C)] 97.4 °F (36.3 °C)  Heart Rate:  [65-84] 75  Resp:  [18-20] 18  BP: (123-152)/(59-89) 124/70  SpO2:  [87 %-99 %] 94 %  on  Flow (L/min):  [2] 2;   Device (Oxygen Therapy): nasal cannula  Body mass index is 25.02 kg/m².  Physical Exam  Vitals and nursing note reviewed.   Constitutional:       General: He is awake. He is not in acute distress.     Comments: Elderly, frail appearing   HENT:      Head: Atraumatic.   Eyes:      General: No scleral icterus.     Conjunctiva/sclera: Conjunctivae normal.   Cardiovascular:      Rate and Rhythm: Normal rate and regular rhythm.      Pulses: Normal pulses.      Heart sounds: Normal heart sounds.   Pulmonary:      Effort: Pulmonary effort is normal. No respiratory distress.      Breath sounds: Decreased breath sounds and wheezing (faint) present.   Abdominal:      General: Bowel sounds are normal. There is no distension.      Palpations: Abdomen is  soft.      Tenderness: There is no abdominal tenderness.   Musculoskeletal:      Right lower leg: Edema present.      Left lower leg: Edema present.   Skin:     General: Skin is warm and dry.      Comments: Chronic venous stasis changes B/L LE   Neurological:      General: No focal deficit present.      Mental Status: He is alert and oriented to person, place, and time.   Psychiatric:         Behavior: Behavior is cooperative.       Results Review:  I reviewed the patient's new clinical results.  I reviewed the patient's new imaging results and agree with the interpretation.  I reviewed the patient's other test results and agree with the interpretation  I personally viewed and interpreted the patient's EKG/Telemetry data    Results from last 7 days   Lab Units 11/14/22  0609 11/13/22  1437   WBC 10*3/mm3 5.48 8.69   HEMOGLOBIN g/dL 12.8* 13.2   PLATELETS 10*3/mm3 203 205     Results from last 7 days   Lab Units 11/14/22  0609 11/13/22  1437   SODIUM mmol/L 140 142   POTASSIUM mmol/L 3.8 4.8   CHLORIDE mmol/L 104 108*   CO2 mmol/L 21.8* 21.5*   BUN mg/dL 24* 28*   CREATININE mg/dL 1.10 1.18   GLUCOSE mg/dL 233* 98   EGFR mL/min/1.73 66.2 60.8     Results from last 7 days   Lab Units 11/14/22  0609 11/13/22  1437   ALBUMIN g/dL 3.90 3.80   BILIRUBIN mg/dL 0.3 0.4   ALK PHOS U/L 75 86   AST (SGOT) U/L 18 27   ALT (SGPT) U/L 15 18     Results from last 7 days   Lab Units 11/14/22  0609 11/13/22  1437   CALCIUM mg/dL 9.4 9.0   ALBUMIN g/dL 3.90 3.80       No results found for: HGBA1C, POCGLU    No radiology results for the last day     Results for orders placed during the hospital encounter of 06/28/17    Adult Transthoracic Echo Complete    Interpretation Summary  · Left ventricular systolic function is normal. Calculated EF = 56.4%.  · Left ventricular diastolic dysfunction is noted (grade I) consistent with impaired relaxation  · Mild aortic valve regurgitation is present.  · Mild mitral valve regurgitation is  present  · There is no evidence of pericardial effusion.        Scheduled Medications  FLUoxetine, 20 mg, Oral, Daily  furosemide, 40 mg, Oral, Daily  gabapentin, 300 mg, Oral, TID  insulin lispro, 0-7 Units, Subcutaneous, TID With Meals  methylphenidate, 10 mg, Oral, Daily  methylPREDNISolone sodium succinate, 60 mg, Intravenous, Q8H  Mirabegron ER, 25 mg, Oral, Daily  pantoprazole, 40 mg, Oral, BID  polyethylene glycol, 17 g, Oral, Daily  rOPINIRole, 0.5 mg, Oral, Nightly  sodium chloride, 10 mL, Intravenous, Q12H  sucralfate, 1 g, Oral, 4x Daily AC & at Bedtime    Infusions   Diet  Diet Regular; Cardiac       Assessment/Plan     Active Hospital Problems    Diagnosis  POA   • **Acute exacerbation of chronic obstructive pulmonary disease (COPD) ([facility]) [J44.1]  Yes   • Overweight (BMI 25.0-29.9) [E66.3]  Yes   • Hiatal hernia [K44.9]  Yes   • Rectal bleed [K62.5]  Yes   • Spinal stenosis, lumbar region with neurogenic claudication [M48.062]  Yes   • COPD exacerbation ([facility]) [J44.1]  Yes   • Peripheral neuropathy [G62.9]  Yes   • Chronic diastolic (congestive) heart failure ([facility]) [I50.32]  Yes   • Prostate cancer ([facility]) [C61]  Yes   • ADD (attention deficit disorder) [F98.8]  Yes   • Hemorrhoids [K64.9]  Yes      Resolved Hospital Problems   No resolved problems to display.       82 y.o. male admitted with Acute exacerbation of chronic obstructive pulmonary disease (COPD) ([facility]).    Chronic obstructive pulmonary disease with chronic respiratory failure (On 2L home O2) with acute exacerbation  - Etiology likely multifactorial in the setting of medication noncompliance and comorbid medical conditions.  - CXR on admission negative for evidence of focal consolidation and/or interstitial pulmonary opacities.  - Patient afebrile, WBC within normal limits, no signs/symptoms to suggest concomitant pneumonia warranting antibiotic therapy at this time.  Plan:  - Continue with methylprednisolone as prescribed at this time.   Consider transition to oral prednisone tomorrow (11/15) if continues to improve.  - Continue with inhalers as currently scheduled, Duonebs q6H scheduled + albuterol q4H PRN.  - Supplemental oxygen PRN to maintain O2 sat 88 to 92%.  - Continuous pulse oximetry and telemetry monitoring.  - Will need home oxygen evaluation and scheduled pulmonology follow up prior to discharge.    Chronic diastolic congestive heart failure  - Most recent 2D echocardiogram showing estimated EF 56.4% with grade 1 diastolic dysfunction.  - Patient felt to be hypervolemic on admission on physical exam on admission, likely from nonadherence to diuretics as outpatient.  Subsequently given IV Lasix leading to edema and good response.  -, Examination secondary patient appears stable from a cardiac perspective overall, does have bilateral lower extremity edema, however, per patient it is decreased from prior.  Respiratory status is stable, no evidence of bilateral interstitial opacities on initial chest x-ray to suggest pulmonary vascular congestion from heart failure exacerbation.  - Continue home furosemide as previously prescribed.  - Close monitoring of renal function and electrolytes,  2g sodium diet, daily weights, strict I/O, continuous pulse oximetry,telemetry monitoring, elevate HOB, supplemental O2 PRN for O2 sat <90%.    GERD/Radiation proctitis/Sigmoid diverticulosis/Colonic angioectasias  - Diagnoses as above, noted on EGD and Colonoscopy from September 2022.  - Appears stable at this time, no evidence of GI bleeding, symptoms of GERD well controlled. Continue PPI and sucralfate as currently prescribed.      · SCDs for DVT prophylaxis.  · Full code.  · Discussed with patient, nursing staff, CCP and care team on multidisciplinary rounds.  · Anticipate discharge TBD timing yet to be determined.      Scar Tirado DO  [facility]  11/14/22  07:40 EST

## 2022-11-14 NOTE — THERAPY EVALUATION
Patient Name: Tony Leonard  : 1940    MRN: [id number]                              Today's Date: 2022       Admit Date: 2022    Visit Dx:     ICD-10-CM ICD-9-CM   1. Acute exacerbation of chronic obstructive pulmonary disease (COPD) ([facility])  J44.1 491.21   2. History of rectal bleeding  Z87.19 V12.79     Patient Active Problem List   Diagnosis   • Thrush, oral   • ADD (attention deficit disorder)   • Hemorrhoids   • Bronchiectasis with acute lower respiratory infection ([facility])   • Diverticul disease small and large intestine, no perforati or abscess   • Benign non-nodular prostatic hyperplasia without lower urinary tract symptoms   • Gastroesophageal reflux disease   • Malaise and fatigue   • Environmental allergies   • Hemoptysis   • Dyslipidemia   • Primary osteoarthritis involving multiple joints   • Pneumonia of right lower lobe due to infectious organism   • Abnormal EKG   • COPD (chronic obstructive pulmonary disease) ([facility])   • Mild malnutrition ([facility])   • Acute on chronic respiratory failure with hypoxia ([facility])   • Fracture, intertrochanteric, right femur, closed, initial encounter ([facility])   • Chronic diastolic CHF (congestive heart failure) ([facility])   • Hematoma of frontal scalp   • Postoperative anemia due to acute blood loss   • Urinary retention   • Hemorrhagic disorder due to circulating anticoagulants ([facility])   • History of fracture of right hip   • Closed fracture of right hip with routine healing   • Chronic low back pain with sciatica   • Change in bowel function   • Rectal bleeding   • Prostate cancer ([facility])   • On home oxygen therapy   • Acute viral bronchitis   • Chronic diastolic (congestive) heart failure ([facility])   • Peripheral neuropathy   • Plantar fasciitis   • COPD exacerbation ([facility])   • Bilateral lower extremity edema   • Microscopic hematuria   • Acute midline low back pain with right-sided sciatica   • Spinal stenosis, lumbar region with neurogenic claudication   • Compression  deformity of vertebra   • Rectal bleed   • Esophagitis   • Angiectasia   • Hiatal hernia   • Acute exacerbation of chronic obstructive pulmonary disease (COPD) ([facility])   • Overweight (BMI 25.0-29.9)     Past Medical History:   Diagnosis Date   • ADHD (attention deficit hyperactivity disorder)    • Bronchiectasis ([facility])    • Colon polyp    • COPD (chronic obstructive pulmonary disease) ([facility])    • Diverticulosis    • Hard of hearing    • On home oxygen therapy     2 LITERS   • Pneumonia    • Prostate cancer ([facility]) 04/22/2019   • Spinal stenosis, lumbar region with neurogenic claudication 08/02/2022     Past Surgical History:   Procedure Laterality Date   • BRONCHOSCOPY N/A 4/30/2016    Procedure: BRONCHOSCOPY with BAL of right lower lobe and left  lower lobe.  ;  Surgeon: Nando Diaz MD;  Location: [facility] ENDOSCOPY;  Service:    • BRONCHOSCOPY N/A 4/28/2017    Procedure: BRONCHOSCOPY;  Surgeon: Katharina Salter MD;  Location: [facility] ENDOSCOPY;  Service:    • BRONCHOSCOPY Bilateral 6/29/2017    Procedure: BRONCHOSCOPY WITH WASHINGS;  Surgeon: Katharina Salter MD;  Location: [facility] ENDOSCOPY;  Service:    • BRONCHOSCOPY N/A 1/22/2018    Procedure: BRONCHOSCOPY AT BEDSIDE with BAL;  Surgeon: Katharina Salter MD;  Location: [facility] ENDOSCOPY;  Service:    • BRONCHOSCOPY N/A 1/30/2018    Procedure: BRONCHOSCOPY with BAL and washing;  Surgeon: Shreyas Wild MD;  Location: [facility] ENDOSCOPY;  Service:    • BRONCHOSCOPY Bilateral 4/24/2018    Procedure: BRONCHOSCOPY WITH WASHING;  Surgeon: Katharina Salter MD;  Location: [facility] ENDOSCOPY;  Service: Pulmonary   • BRONCHOSCOPY N/A 12/28/2019    Procedure: BRONCHOSCOPY with bilateral washing;  Surgeon: Katharina Salter MD;  Location: [facility] ENDOSCOPY;  Service: Pulmonary   • COLONOSCOPY  05/17/2013    eh, ih, tort, sig tics   • COLONOSCOPY N/A 5/10/2019    Non-thrombosed external hemorrhoids found on perianal exam, Diverticulosis, Tortuous colon, One 5 mm polyp in the mid ascending  colon, IH. Path: Tubular adenoma.    • COLONOSCOPY N/A 9/24/2022    Procedure: COLONOSCOPY to cecum and TI with argon plasma coagulation.;  Surgeon: Bruec Black MD;  Location: [facility] ENDOSCOPY;  Service: Gastroenterology;  Laterality: N/A;  pre- GI bleeding  post- radiation proctitis, diverticulosis   • ENDOSCOPY  03/19/2015    z line irreg, hh   • ENDOSCOPY N/A 9/24/2022    Procedure: ESOPHAGOGASTRODUODENOSCOPY;  Surgeon: Bruce Black MD;  Location: [facility] ENDOSCOPY;  Service: Gastroenterology;  Laterality: N/A;  pre- GI bleeding  post- esophagitis, hiatal hernia   • HIP OPEN REDUCTION Right 1/17/2018    Procedure: HIP OPEN REDUCTION INTERNAL FIXATION WITH DYNAMIC HIP SCREW;  Surgeon: Les Black MD;  Location: [facility] MAIN OR;  Service:    • SPINE SURGERY     • TONSILLECTOMY     • TOTAL HIP ARTHROPLASTY REVISION Right 9/23/2019    Procedure: HIP  REVISION RIGHT;  Surgeon: Isauro Warner MD;  Location: [facility] MAIN OR;  Service: Orthopedics      General Information     Row Name 11/14/22 4693          Physical Therapy Time and Intention    Document Type discharge evaluation/summary  -MS     Mode of Treatment physical therapy  -MS     Row Name 11/14/22 1333          General Information    Patient Profile Reviewed yes  -MS     Prior Level of Function independent:;all household mobility  -MS     Existing Precautions/Restrictions no known precautions/restrictions  -MS     Barriers to Rehab none identified  -MS     Row Name 11/14/22 1333          Living Environment    People in Home alone  -MS     Row Name 11/14/22 1333          Home Main Entrance    Number of Stairs, Main Entrance --  elevator access  -MS     Row Name 11/14/22 1333          Cognition    Orientation Status (Cognition) oriented x 4  -MS     Row Name 11/14/22 1333          Safety Issues, Functional Mobility    Impairments Affecting Function (Mobility) strength  -MS     Comment, Safety Issues/Impairments (Mobility) Gait belt and non skid socks  john.  -MS           User Key  (r) = Recorded By, (t) = Taken By, (c) = Cosigned By    Initials Name Provider Type    Devika Nolen, PT Physical Therapist               Mobility     Row Name 11/14/22 1336          Bed Mobility    Bed Mobility supine-sit;sit-supine  -MS     Supine-Sit Glenn (Bed Mobility) supervision  -MS     Sit-Supine Glenn (Bed Mobility) supervision  -MS     Comment, (Bed Mobility) SBA for sitting balance.  -MS     Row Name 11/14/22 1336          Transfers    Comment, (Transfers) Noted incr assist level- patient reported at baseline it can take him almost up to 20 minutes to stand due to spinal hx.  -MS     Row Name 11/14/22 1336          Sit-Stand Transfer    Sit-Stand Glenn (Transfers) minimum assist (75% patient effort);moderate assist (50% patient effort);verbal cues;nonverbal cues (demo/gesture)  -MS     Assistive Device (Sit-Stand Transfers) walker, front-wheeled  -MS     Row Name 11/14/22 1336          Gait/Stairs (Locomotion)    Glenn Level (Gait) standby assist;verbal cues  -MS     Assistive Device (Gait) walker, front-wheeled  -MS     Distance in Feet (Gait) 150'  -MS     Deviations/Abnormal Patterns (Gait) norm decreased;gait speed decreased  -MS     Bilateral Gait Deviations forward flexed posture  -MS     Comment, (Gait/Stairs) No overt LOB or veering noted, one coughing episode with ambulation but did not appear to affect LOB  -MS           User Key  (r) = Recorded By, (t) = Taken By, (c) = Cosigned By    Initials Name Provider Type    MS SolomonsDevika, PT Physical Therapist               Obj/Interventions     Row Name 11/14/22 1339          Range of Motion Comprehensive    General Range of Motion no range of motion deficits identified  -MS     Row Name 11/14/22 1339          Strength Comprehensive (MMT)    Comment, General Manual Muscle Testing (MMT) Assessment B LEs grossly at least >3+/5  -MS     Row Name 11/14/22 1339          Balance     Balance Assessment sitting static balance;standing static balance  -MS     Static Sitting Balance standby assist  -MS     Position, Sitting Balance sitting edge of bed  -MS     Static Standing Balance contact guard  -MS     Position/Device Used, Standing Balance supported;walker, rolling  -MS     Row Name 11/14/22 1339          Sensory Assessment (Somatosensory)    Sensory Assessment (Somatosensory) sensation intact  -MS           User Key  (r) = Recorded By, (t) = Taken By, (c) = Cosigned By    Initials Name Provider Type    Devika Nolen, PT Physical Therapist               Goals/Plan    No documentation.                Clinical Impression     Row Name 11/14/22 1340          Pain    Pretreatment Pain Rating 0/10 - no pain  -MS     Posttreatment Pain Rating 0/10 - no pain  -MS     Row Name 11/14/22 1340          Therapy Assessment/Plan (PT)    Criteria for Skilled Interventions Met (PT) no;no problems identified which require skilled intervention  -MS     Therapy Frequency (PT) evaluation only  -MS     Row Name 11/14/22 1340          Vital Signs    O2 Delivery Pre Treatment supplemental O2  -MS     O2 Delivery Intra Treatment room air  -MS     Post SpO2 (%) 95  -MS     O2 Delivery Post Treatment room air  -MS     Row Name 11/14/22 1340          Positioning and Restraints    Pre-Treatment Position in bed  -MS     Post Treatment Position bed  -MS     In Bed notified nsg;fowlers;call light within reach;encouraged to call for assist;exit alarm on  -MS           User Key  (r) = Recorded By, (t) = Taken By, (c) = Cosigned By    Initials Name Provider Type    Devika Nolen, PT Physical Therapist               Outcome Measures     Row Name 11/14/22 1342          How much help from another person do you currently need...    Turning from your back to your side while in flat bed without using bedrails? 4  -MS     Moving from lying on back to sitting on the side of a flat bed without bedrails? 4  -MS      Moving to and from a bed to a chair (including a wheelchair)? 3  -MS     Standing up from a chair using your arms (e.g., wheelchair, bedside chair)? 2  -MS     Climbing 3-5 steps with a railing? 3  -MS     To walk in hospital room? 3  -MS     AM-PAC 6 Clicks Score (PT) 19  -MS     Highest level of mobility 6 --> Walked 10 steps or more  -MS     Row Name 11/14/22 1342          Functional Assessment    Outcome Measure Options AM-PAC 6 Clicks Basic Mobility (PT)  -MS           User Key  (r) = Recorded By, (t) = Taken By, (c) = Cosigned By    Initials Name Provider Type    MS SolomonsDevika, PT Physical Therapist                               PT Recommendation and Plan           Time Calculation:    PT Charges     Row Name 11/14/22 1332             Time Calculation    Start Time 1304  -MS      Stop Time 1325  -MS      Time Calculation (min) 21 min  -MS      PT Received On 11/14/22  -MS         Time Calculation- PT    Total Timed Code Minutes- PT 15 minute(s)  -MS            User Key  (r) = Recorded By, (t) = Taken By, (c) = Cosigned By    Initials Name Provider Type    Devika Nolen, PT Physical Therapist              Therapy Charges for Today     Code Description Service Date Service Provider Modifiers Qty    03230497733 HC PT EVAL LOW COMPLEXITY 2 11/14/2022 Devika Wakefield, PT GP 1    41874320682 HC PT THER PROC EA 15 MIN 11/14/2022 Devika Wakefield, PT GP 1          PT G-Codes  Outcome Measure Options: AM-PAC 6 Clicks Basic Mobility (PT)  AM-PAC 6 Clicks Score (PT): 19    Devika Wakefield, PT  11/14/2022

## 2022-11-14 NOTE — DISCHARGE PLACEMENT REQUEST
"Tony Casey (82 y.o. Male)     Date of Birth   1940    Social Security Number       Address   [address] [place] [address]    Home Phone   [phone]    MRN   [id number]       P[employer]ism   Rastafari    Marital Status   Single                            Admission Date   11/13/22    Admission Type   Emergency    Admitting Provider   Danni Frye MD    Attending Provider   Scar Tirado DO    Department, Room/Bed   [address], [address]       Discharge Date       Discharge Disposition       Discharge Destination                               Attending Provider: Scar Tirado DO    Allergies: Daliresp [Roflumilast], Latex, Sulfa Antibiotics    Isolation: None   Infection: None   Code Status: CPR    Ht: 180.3 cm (71\")   Wt: 81.4 kg (179 lb 6.4 oz)    Admission Cmt: None   Principal Problem: Acute exacerbation of chronic obstructive pulmonary disease (COPD) ([facility]) [J44.1]                 Active Insurance as of 11/13/2022     Primary Coverage     Payor Plan Insurance Group Employer/Plan Group    MEDICARE MEDICARE A & B      Payor Plan Address Payor Plan Phone Number Payor Plan Fax Number Effective Dates    [address] [phone]  5/1/2003 - None Entered    [facility] [id number]       Subscriber Name Subscriber Birth Date Member ID       TONY CASEY 1940 [id number]           Secondary Coverage     Payor Plan Insurance Group Employer/Plan Group     FOR LIFE  FOR LIFE  SUP       Payor Plan Address Payor Plan Phone Number Payor Plan Fax Number Effective Dates    [address] [phone]  3/10/2016 - None Entered    [facility] [address]       Subscriber Name Subscriber Birth Date Member ID       TONY CASEY 1940 [id number]                 Emergency Contacts      (Rel.) Home Phone Work Phone Mobile Phone    Ricardo Casey (Son) [phone] -- [phone]    Frederick Casey (Son) [phone] -- --    VICKY CASEY (Son) " [phone] -- [phone]

## 2022-11-14 NOTE — CONSULTS
"Nutrition Services    Patient Name:  Tony Leonard  YOB: 1940  MRN: [id number]  Admit Date:  11/13/2022        NUTRITION EDUCATION        Consult received from:  Consulted for diet education BMI>25, 2gNa diet        Education topic: CHF, Sodium, Weight management        Resources given:  Printed materials, Sample menus        Advised regarding: Appropriate portions, Beverage choices, Eating pattern, Food choices, Food preparation, Food shopping, Label reading, Seasoning foods        Learner:  Patient        Readiness to learn: Accepting and Eager        Food & nutrition history: Provided 2gNa/CHF diet education to patient. Answered all questions and pt understands well and likely to comply.         Current height: Height: 180.3 cm (71\")    Current weight: Weight: 81.4 kg (179 lb 6.4 oz) (11/14/22 0006)    BMI: Body mass index is 25.02 kg/m².        Labs:  Reviewed, listed below        Pertinent medications:  Diuretic, Insulin, Laxative, Steroids        Monitor/Evaluation: RD to follow per protocol, reinforce PRN        Discharge Planning Pending PT eval, home alone w/ HH now          RD contact info provided: Yes. Contact RD with any questions/concerns.       Results from last 7 days   Lab Units 11/14/22  0609 11/13/22  1437   SODIUM mmol/L 140 142   POTASSIUM mmol/L 3.8 4.8   CHLORIDE mmol/L 104 108*   CO2 mmol/L 21.8* 21.5*   BUN mg/dL 24* 28*   CREATININE mg/dL 1.10 1.18   CALCIUM mg/dL 9.4 9.0   BILIRUBIN mg/dL 0.3 0.4   ALK PHOS U/L 75 86   ALT (SGPT) U/L 15 18   AST (SGOT) U/L 18 27   GLUCOSE mg/dL 233* 98     Results from last 7 days   Lab Units 11/14/22  0609   HEMOGLOBIN g/dL 12.8*   HEMATOCRIT % 37.8     COVID19   Date Value Ref Range Status   11/13/2022 Not Detected Not Detected - Ref. Range Final     Lab Results   Component Value Date    HGBA1C 5.39 04/04/2017           Electronically signed by:  Shavonne Bone RD  11/14/22 17:16 EST  "

## 2022-11-15 ENCOUNTER — APPOINTMENT (OUTPATIENT)
Dept: GENERAL RADIOLOGY | Facility: HOSPITAL | Age: 82
End: 2022-11-15

## 2022-11-15 PROBLEM — D72.829 LEUKOCYTOSIS: Status: ACTIVE | Noted: 2022-11-15

## 2022-11-15 LAB
ANION GAP SERPL CALCULATED.3IONS-SCNC: 9 MMOL/L (ref 5–15)
BACTERIA SPEC RESP CULT: NORMAL
BASOPHILS # BLD AUTO: 0.02 10*3/MM3 (ref 0–0.2)
BASOPHILS NFR BLD AUTO: 0.1 % (ref 0–1.5)
BUN SERPL-MCNC: 34 MG/DL (ref 8–23)
BUN/CREAT SERPL: 32.7 (ref 7–25)
CALCIUM SPEC-SCNC: 8.9 MG/DL (ref 8.6–10.5)
CHLORIDE SERPL-SCNC: 104 MMOL/L (ref 98–107)
CO2 SERPL-SCNC: 26 MMOL/L (ref 22–29)
CREAT SERPL-MCNC: 1.04 MG/DL (ref 0.76–1.27)
D-LACTATE SERPL-SCNC: 2.7 MMOL/L (ref 0.5–2)
D-LACTATE SERPL-SCNC: 3.6 MMOL/L (ref 0.5–2)
DEPRECATED RDW RBC AUTO: 40.8 FL (ref 37–54)
EGFRCR SERPLBLD CKD-EPI 2021: 70.8 ML/MIN/1.73
EOSINOPHIL # BLD AUTO: 0 10*3/MM3 (ref 0–0.4)
EOSINOPHIL NFR BLD AUTO: 0 % (ref 0.3–6.2)
ERYTHROCYTE [DISTWIDTH] IN BLOOD BY AUTOMATED COUNT: 11.7 % (ref 12.3–15.4)
GLUCOSE BLDC GLUCOMTR-MCNC: 113 MG/DL (ref 70–130)
GLUCOSE BLDC GLUCOMTR-MCNC: 124 MG/DL (ref 70–130)
GLUCOSE BLDC GLUCOMTR-MCNC: 125 MG/DL (ref 70–130)
GLUCOSE BLDC GLUCOMTR-MCNC: 128 MG/DL (ref 70–130)
GLUCOSE SERPL-MCNC: 151 MG/DL (ref 65–99)
GRAM STN SPEC: NORMAL
HCT VFR BLD AUTO: 35.1 % (ref 37.5–51)
HGB BLD-MCNC: 11.7 G/DL (ref 13–17.7)
IMM GRANULOCYTES # BLD AUTO: 0.14 10*3/MM3 (ref 0–0.05)
IMM GRANULOCYTES NFR BLD AUTO: 0.9 % (ref 0–0.5)
L PNEUMO1 AG UR QL IA: NEGATIVE
LYMPHOCYTES # BLD AUTO: 0.76 10*3/MM3 (ref 0.7–3.1)
LYMPHOCYTES NFR BLD AUTO: 5 % (ref 19.6–45.3)
MCH RBC QN AUTO: 32.1 PG (ref 26.6–33)
MCHC RBC AUTO-ENTMCNC: 33.3 G/DL (ref 31.5–35.7)
MCV RBC AUTO: 96.4 FL (ref 79–97)
MONOCYTES # BLD AUTO: 0.63 10*3/MM3 (ref 0.1–0.9)
MONOCYTES NFR BLD AUTO: 4.2 % (ref 5–12)
NEUTROPHILS NFR BLD AUTO: 13.59 10*3/MM3 (ref 1.7–7)
NEUTROPHILS NFR BLD AUTO: 89.8 % (ref 42.7–76)
NRBC BLD AUTO-RTO: 0 /100 WBC (ref 0–0.2)
PLATELET # BLD AUTO: 200 10*3/MM3 (ref 140–450)
PMV BLD AUTO: 10.1 FL (ref 6–12)
POTASSIUM SERPL-SCNC: 4 MMOL/L (ref 3.5–5.2)
PROCALCITONIN SERPL-MCNC: 0.02 NG/ML (ref 0–0.25)
RBC # BLD AUTO: 3.64 10*6/MM3 (ref 4.14–5.8)
S PNEUM AG SPEC QL LA: NEGATIVE
SODIUM SERPL-SCNC: 139 MMOL/L (ref 136–145)
WBC NRBC COR # BLD: 15.14 10*3/MM3 (ref 3.4–10.8)

## 2022-11-15 PROCEDURE — 97535 SELF CARE MNGMENT TRAINING: CPT

## 2022-11-15 PROCEDURE — 25010000002 CEFTRIAXONE PER 250 MG: Performed by: STUDENT IN AN ORGANIZED HEALTH CARE EDUCATION/TRAINING PROGRAM

## 2022-11-15 PROCEDURE — 97166 OT EVAL MOD COMPLEX 45 MIN: CPT

## 2022-11-15 PROCEDURE — 85025 COMPLETE CBC W/AUTO DIFF WBC: CPT | Performed by: STUDENT IN AN ORGANIZED HEALTH CARE EDUCATION/TRAINING PROGRAM

## 2022-11-15 PROCEDURE — 83605 ASSAY OF LACTIC ACID: CPT | Performed by: STUDENT IN AN ORGANIZED HEALTH CARE EDUCATION/TRAINING PROGRAM

## 2022-11-15 PROCEDURE — 71045 X-RAY EXAM CHEST 1 VIEW: CPT

## 2022-11-15 PROCEDURE — 80048 BASIC METABOLIC PNL TOTAL CA: CPT | Performed by: STUDENT IN AN ORGANIZED HEALTH CARE EDUCATION/TRAINING PROGRAM

## 2022-11-15 PROCEDURE — 87449 NOS EACH ORGANISM AG IA: CPT | Performed by: STUDENT IN AN ORGANIZED HEALTH CARE EDUCATION/TRAINING PROGRAM

## 2022-11-15 PROCEDURE — 94664 DEMO&/EVAL PT USE INHALER: CPT

## 2022-11-15 PROCEDURE — 82962 GLUCOSE BLOOD TEST: CPT

## 2022-11-15 PROCEDURE — 87040 BLOOD CULTURE FOR BACTERIA: CPT | Performed by: STUDENT IN AN ORGANIZED HEALTH CARE EDUCATION/TRAINING PROGRAM

## 2022-11-15 PROCEDURE — 87899 AGENT NOS ASSAY W/OPTIC: CPT | Performed by: STUDENT IN AN ORGANIZED HEALTH CARE EDUCATION/TRAINING PROGRAM

## 2022-11-15 PROCEDURE — 25010000002 ENOXAPARIN PER 10 MG: Performed by: STUDENT IN AN ORGANIZED HEALTH CARE EDUCATION/TRAINING PROGRAM

## 2022-11-15 PROCEDURE — 87205 SMEAR GRAM STAIN: CPT | Performed by: STUDENT IN AN ORGANIZED HEALTH CARE EDUCATION/TRAINING PROGRAM

## 2022-11-15 PROCEDURE — 63710000001 PREDNISONE PER 1 MG: Performed by: STUDENT IN AN ORGANIZED HEALTH CARE EDUCATION/TRAINING PROGRAM

## 2022-11-15 PROCEDURE — 84145 PROCALCITONIN (PCT): CPT | Performed by: STUDENT IN AN ORGANIZED HEALTH CARE EDUCATION/TRAINING PROGRAM

## 2022-11-15 PROCEDURE — 94799 UNLISTED PULMONARY SVC/PX: CPT

## 2022-11-15 PROCEDURE — 99222 1ST HOSP IP/OBS MODERATE 55: CPT | Performed by: INTERNAL MEDICINE

## 2022-11-15 RX ORDER — GUAIFENESIN 200 MG/10ML
200 LIQUID ORAL EVERY 4 HOURS PRN
Status: DISCONTINUED | OUTPATIENT
Start: 2022-11-15 | End: 2022-11-19 | Stop reason: HOSPADM

## 2022-11-15 RX ORDER — AZITHROMYCIN 250 MG/1
500 TABLET, FILM COATED ORAL DAILY
Status: COMPLETED | OUTPATIENT
Start: 2022-11-15 | End: 2022-11-15

## 2022-11-15 RX ORDER — AZITHROMYCIN 250 MG/1
250 TABLET, FILM COATED ORAL EVERY 24 HOURS
Status: COMPLETED | OUTPATIENT
Start: 2022-11-16 | End: 2022-11-19

## 2022-11-15 RX ORDER — ENOXAPARIN SODIUM 100 MG/ML
40 INJECTION SUBCUTANEOUS EVERY 24 HOURS
Status: DISCONTINUED | OUTPATIENT
Start: 2022-11-15 | End: 2022-11-19 | Stop reason: HOSPADM

## 2022-11-15 RX ADMIN — FLUOXETINE HYDROCHLORIDE 20 MG: 20 CAPSULE ORAL at 10:49

## 2022-11-15 RX ADMIN — Medication 10 ML: at 21:59

## 2022-11-15 RX ADMIN — ENOXAPARIN SODIUM 40 MG: 100 INJECTION SUBCUTANEOUS at 21:54

## 2022-11-15 RX ADMIN — MIRABEGRON 25 MG: 25 TABLET, FILM COATED, EXTENDED RELEASE ORAL at 10:49

## 2022-11-15 RX ADMIN — IPRATROPIUM BROMIDE AND ALBUTEROL SULFATE 3 ML: 2.5; .5 SOLUTION RESPIRATORY (INHALATION) at 12:43

## 2022-11-15 RX ADMIN — GABAPENTIN 300 MG: 300 CAPSULE ORAL at 21:54

## 2022-11-15 RX ADMIN — IPRATROPIUM BROMIDE AND ALBUTEROL SULFATE 3 ML: 2.5; .5 SOLUTION RESPIRATORY (INHALATION) at 19:11

## 2022-11-15 RX ADMIN — PREDNISONE 40 MG: 20 TABLET ORAL at 10:49

## 2022-11-15 RX ADMIN — GABAPENTIN 300 MG: 300 CAPSULE ORAL at 16:51

## 2022-11-15 RX ADMIN — Medication 10 ML: at 10:50

## 2022-11-15 RX ADMIN — ACETAMINOPHEN 650 MG: 325 TABLET, FILM COATED ORAL at 10:53

## 2022-11-15 RX ADMIN — FUROSEMIDE 40 MG: 40 TABLET ORAL at 10:49

## 2022-11-15 RX ADMIN — ACETAMINOPHEN 650 MG: 325 TABLET, FILM COATED ORAL at 16:51

## 2022-11-15 RX ADMIN — PANTOPRAZOLE SODIUM 40 MG: 40 TABLET, DELAYED RELEASE ORAL at 10:49

## 2022-11-15 RX ADMIN — AZITHROMYCIN 500 MG: 250 TABLET, FILM COATED ORAL at 16:51

## 2022-11-15 RX ADMIN — BENZONATATE 200 MG: 100 CAPSULE ORAL at 10:53

## 2022-11-15 RX ADMIN — SUCRALFATE 1 G: 1 TABLET ORAL at 10:49

## 2022-11-15 RX ADMIN — GUAIFENESIN 200 MG: 100 SOLUTION ORAL at 21:56

## 2022-11-15 RX ADMIN — SUCRALFATE 1 G: 1 TABLET ORAL at 16:51

## 2022-11-15 RX ADMIN — GABAPENTIN 300 MG: 300 CAPSULE ORAL at 10:49

## 2022-11-15 RX ADMIN — PANTOPRAZOLE SODIUM 40 MG: 40 TABLET, DELAYED RELEASE ORAL at 21:54

## 2022-11-15 RX ADMIN — SUCRALFATE 1 G: 1 TABLET ORAL at 21:53

## 2022-11-15 RX ADMIN — METHYLPHENIDATE HYDROCHLORIDE 10 MG: 5 TABLET ORAL at 10:49

## 2022-11-15 RX ADMIN — CEFTRIAXONE SODIUM 1 G: 1 INJECTION, POWDER, FOR SOLUTION INTRAMUSCULAR; INTRAVENOUS at 16:51

## 2022-11-15 RX ADMIN — ROPINIROLE HYDROCHLORIDE 0.5 MG: 0.5 TABLET, FILM COATED ORAL at 21:53

## 2022-11-15 RX ADMIN — IPRATROPIUM BROMIDE AND ALBUTEROL SULFATE 3 ML: 2.5; .5 SOLUTION RESPIRATORY (INHALATION) at 09:10

## 2022-11-15 NOTE — THERAPY EVALUATION
Patient Name: Tony Leonard  : 1940    MRN: [id number]                              Today's Date: 11/15/2022       Admit Date: 2022    Visit Dx:     ICD-10-CM ICD-9-CM   1. Acute exacerbation of chronic obstructive pulmonary disease (COPD) ([facility])  J44.1 491.21   2. History of rectal bleeding  Z87.19 V12.79     Patient Active Problem List   Diagnosis   • Thrush, oral   • ADD (attention deficit disorder)   • Hemorrhoids   • Bronchiectasis with acute lower respiratory infection ([facility])   • Diverticul disease small and large intestine, no perforati or abscess   • Benign non-nodular prostatic hyperplasia without lower urinary tract symptoms   • Gastroesophageal reflux disease   • Malaise and fatigue   • Environmental allergies   • Hemoptysis   • Dyslipidemia   • Primary osteoarthritis involving multiple joints   • Pneumonia of right lower lobe due to infectious organism   • Abnormal EKG   • COPD (chronic obstructive pulmonary disease) ([facility])   • Mild malnutrition ([facility])   • Acute on chronic respiratory failure with hypoxia ([facility])   • Fracture, intertrochanteric, right femur, closed, initial encounter ([facility])   • Chronic diastolic CHF (congestive heart failure) ([facility])   • Hematoma of frontal scalp   • Postoperative anemia due to acute blood loss   • Urinary retention   • Hemorrhagic disorder due to circulating anticoagulants ([facility])   • History of fracture of right hip   • Closed fracture of right hip with routine healing   • Chronic low back pain with sciatica   • Change in bowel function   • Rectal bleeding   • Prostate cancer ([facility])   • On home oxygen therapy   • Acute viral bronchitis   • Chronic diastolic (congestive) heart failure ([facility])   • Peripheral neuropathy   • Plantar fasciitis   • COPD exacerbation ([facility])   • Bilateral lower extremity edema   • Microscopic hematuria   • Acute midline low back pain with right-sided sciatica   • Spinal stenosis, lumbar region with neurogenic claudication   • Compression  deformity of vertebra   • Rectal bleed   • Esophagitis   • Angiectasia   • Hiatal hernia   • Acute exacerbation of chronic obstructive pulmonary disease (COPD) ([facility])   • Overweight (BMI 25.0-29.9)     Past Medical History:   Diagnosis Date   • ADHD (attention deficit hyperactivity disorder)    • Bronchiectasis ([facility])    • Colon polyp    • COPD (chronic obstructive pulmonary disease) ([facility])    • Diverticulosis    • Hard of hearing    • On home oxygen therapy     2 LITERS   • Pneumonia    • Prostate cancer ([facility]) 04/22/2019   • Spinal stenosis, lumbar region with neurogenic claudication 08/02/2022     Past Surgical History:   Procedure Laterality Date   • BRONCHOSCOPY N/A 4/30/2016    Procedure: BRONCHOSCOPY with BAL of right lower lobe and left  lower lobe.  ;  Surgeon: Nando Diaz MD;  Location: [facility] ENDOSCOPY;  Service:    • BRONCHOSCOPY N/A 4/28/2017    Procedure: BRONCHOSCOPY;  Surgeon: Katharina Salter MD;  Location: [facility] ENDOSCOPY;  Service:    • BRONCHOSCOPY Bilateral 6/29/2017    Procedure: BRONCHOSCOPY WITH WASHINGS;  Surgeon: Katharina Salter MD;  Location: [facility] ENDOSCOPY;  Service:    • BRONCHOSCOPY N/A 1/22/2018    Procedure: BRONCHOSCOPY AT BEDSIDE with BAL;  Surgeon: Katharina Salter MD;  Location: [facility] ENDOSCOPY;  Service:    • BRONCHOSCOPY N/A 1/30/2018    Procedure: BRONCHOSCOPY with BAL and washing;  Surgeon: Shreyas Wild MD;  Location: [facility] ENDOSCOPY;  Service:    • BRONCHOSCOPY Bilateral 4/24/2018    Procedure: BRONCHOSCOPY WITH WASHING;  Surgeon: Katharina Salter MD;  Location: [facility] ENDOSCOPY;  Service: Pulmonary   • BRONCHOSCOPY N/A 12/28/2019    Procedure: BRONCHOSCOPY with bilateral washing;  Surgeon: Katharina Salter MD;  Location: [facility] ENDOSCOPY;  Service: Pulmonary   • COLONOSCOPY  05/17/2013    eh, ih, tort, sig tics   • COLONOSCOPY N/A 5/10/2019    Non-thrombosed external hemorrhoids found on perianal exam, Diverticulosis, Tortuous colon, One 5 mm polyp in the mid ascending  colon, IH. Path: Tubular adenoma.    • COLONOSCOPY N/A 9/24/2022    Procedure: COLONOSCOPY to cecum and TI with argon plasma coagulation.;  Surgeon: Bruce Black MD;  Location: [facility] ENDOSCOPY;  Service: Gastroenterology;  Laterality: N/A;  pre- GI bleeding  post- radiation proctitis, diverticulosis   • ENDOSCOPY  03/19/2015    z line irreg, hh   • ENDOSCOPY N/A 9/24/2022    Procedure: ESOPHAGOGASTRODUODENOSCOPY;  Surgeon: Bruce Black MD;  Location: [facility] ENDOSCOPY;  Service: Gastroenterology;  Laterality: N/A;  pre- GI bleeding  post- esophagitis, hiatal hernia   • HIP OPEN REDUCTION Right 1/17/2018    Procedure: HIP OPEN REDUCTION INTERNAL FIXATION WITH DYNAMIC HIP SCREW;  Surgeon: Les Black MD;  Location: [facility] MAIN OR;  Service:    • SPINE SURGERY     • TONSILLECTOMY     • TOTAL HIP ARTHROPLASTY REVISION Right 9/23/2019    Procedure: HIP  REVISION RIGHT;  Surgeon: Isauro Warner MD;  Location: [facility] MAIN OR;  Service: Orthopedics      General Information     Row Name 11/15/22 1300          OT Time and Intention    Document Type evaluation  -SM     Mode of Treatment occupational therapy;individual therapy  -     Row Name 11/15/22 1300          General Information    Patient Profile Reviewed yes  -SM     Prior Level of Function independent:;ADL's;all household mobility  pt but does state is takes him a lot of extra time and also at times spends a lot of time in bed  -     Existing Precautions/Restrictions fall  -     Row Name 11/15/22 1300          Occupational Profile    Environmental Supports and Barriers (Occupational Profile) home DME includes tri rolling walker, shower chair, grab bars in bathroom, home O2 at night, dressing equipment- sock aid and reacher.  -     Row Name 11/15/22 1300          Living Environment    People in Home alone;facility resident  independent living facility  -     Row Name 11/15/22 1300          Cognition    Orientation Status (Cognition) oriented x 4   -[facility] Name 11/15/22 1300          Safety Issues, Functional Mobility    Safety Issues Affecting Function (Mobility) insight into deficits/self-awareness;awareness of need for assistance  -     Impairments Affecting Function (Mobility) strength;pain;endurance/activity tolerance;balance  -           User Key  (r) = Recorded By, (t) = Taken By, (c) = Cosigned By    Initials Name Provider Type     Madelyn Em OT Occupational Therapist                 Mobility/ADL's     Row Name 11/15/22 1302          Bed Mobility    Supine-Sit [place] (Bed Mobility) minimum assist (75% patient effort);verbal cues  -     Assistive Device (Bed Mobility) head of bed elevated;bed rails  -[facility] Name 11/15/22 1302          Transfers    Transfers toilet transfer  -[facility] Name 11/15/22 1302          Sit-Stand Transfer    Sit-Stand [place] (Transfers) contact guard  -     Assistive Device (Sit-Stand Transfers) walker, front-wheeled  -[facility] Name 11/15/22 1302          Toilet Transfer    [place] Level (Toilet Transfer) minimum assist (75% patient effort)  from lower seat  -     Assistive Device (Toilet Transfer) walker, front-wheeled  -[facility] Name 11/15/22 1302          Functional Mobility    Functional Mobility- Ind. Level contact guard assist  -     Functional Mobility- Device walker, front-wheeled  -     Functional Mobility-Distance (Feet) --  25  -[facility] Name 11/15/22 1302          Activities of Daily Living    BADL Assessment/Intervention lower body dressing;toileting  -[facility] Name 11/15/22 1302          Lower Body Dressing Assessment/Training    Position (Lower Body Dressing) edge of bed sitting  -     Comment, (Lower Body Dressing) pt reports he uses AE at baseline to complete, he is able to reach to top of feet to adjust socks  -[facility] Name 11/15/22 1302          Toileting Assessment/Training    [place] Level (Toileting) toileting skills;minimum assist (75%  patient effort)  -           User Key  (r) = Recorded By, (t) = Taken By, (c) = Cosigned By    Initials Name Provider Type    Madelyn Dueñas OT Occupational Therapist               Obj/Interventions     Row Name 11/15/22 1303          Range of Motion Comprehensive    General Range of Motion bilateral upper extremity ROM WFL  -SM     Row Name 11/15/22 1303          Strength Comprehensive (MMT)    Comment, General Manual Muscle Testing (MMT) Assessment BUE 3+/5  -SM     Row Name 11/15/22 1303          Motor Skills    Motor Skills functional endurance  -     Functional Endurance fair -  -SM     Row Name 11/15/22 1303          Balance    Balance Assessment standing dynamic balance  -     Static Sitting Balance standby assist  -     Position, Sitting Balance sitting edge of bed  -     Static Standing Balance contact guard  -     Dynamic Standing Balance contact guard  -     Position/Device Used, Standing Balance supported;walker, rolling  -           User Key  (r) = Recorded By, (t) = Taken By, (c) = Cosigned By    Initials Name Provider Type    Madelyn Dueñas OT Occupational Therapist               Goals/Plan     Row Name 11/15/22 1318          Transfer Goal 1 (OT)    Activity/Assistive Device (Transfer Goal 1, OT) transfers, all;toilet;sit-to-stand/stand-to-sit  -SM     [place] Level/Cues Needed (Transfer Goal 1, OT) supervision required  -SM     Time Frame (Transfer Goal 1, OT) short term goal (STG);2 weeks  -SM     Progress/Outcome (Transfer Goal 1, OT) goal ongoing  -SM     Row Name 11/15/22 1318          Bathing Goal 1 (OT)    Activity/Device (Bathing Goal 1, OT) bathing skills, all;shower chair  -SM     [place] Level/Cues Needed (Bathing Goal 1, OT) supervision required  -SM     Time Frame (Bathing Goal 1, OT) short term goal (STG);2 weeks  -SM     Progress/Outcomes (Bathing Goal 1, OT) goal ongoing  -SM     Row Name 11/15/22 1318          Toileting Goal 1 (OT)     "Activity/Device (Toileting Goal 1, OT) toileting skills, all  -SM     [place] Level/Cues Needed (Toileting Goal 1, OT) supervision required  -SM     Time Frame (Toileting Goal 1, OT) short term goal (STG);2 weeks  -SM     Progress/Outcome (Toileting Goal 1, OT) goal ongoing  -SM     Row Name 11/15/22 1646          Therapy Assessment/Plan (OT)    Planned Therapy Interventions (OT) activity tolerance training;adaptive equipment training;BADL retraining;functional balance retraining;occupation/activity based interventions;patient/caregiver education/training;transfer/mobility retraining;strengthening exercise  -SM           User Key  (r) = Recorded By, (t) = Taken By, (c) = Cosigned By    Initials Name Provider Type    Madelyn Dueñas OT Occupational Therapist               Clinical Impression     Row Name 11/15/22 7593          Pain Assessment    Pretreatment Pain Rating 8/10  -SM     Posttreatment Pain Rating 10/10  -SM     Pain Location - Side/Orientation Bilateral  -SM     Pain Location lower  -SM     Pain Location - hip;back  -SM     Pain Intervention(s) Ambulation/increased activity;Repositioned;Nursing Notified  -     Row Name 11/15/22 5959          Plan of Care Review    Plan of Care Reviewed With patient  -SM     Outcome Evaluation Pt is a 82 y.o male admitted from his independent living facility with acute exacerbationm of COPD. Pt is able to complete his ADLs modified independently at home but does report he has hx of spinal issues as well as pelvic fracture 1 year prior and still having mobility issues. He reports \"sometimes it takes me 20 minutes to get out of bed.\" Today pt requires CGA/Min A for mobility tasks. He has difficulty with ADLs today. Chronic pain issues noted and he reports 8/10 to 10/10 pain today. He also has a lot of coughing and overall slow pace during all tasks. He could benefit from continued OT to address deficits and increase his ADL performance back to PLOF, he appears " to be functioning slightly below his baseline. He's goal is to [place] back home in [facility] but may benefit from  OT as well.  -[facility] Name 11/15/22 1304          Therapy Assessment/Plan (OT)    Rehab Potential (OT) good, to achieve stated therapy goals  -     Criteria for Skilled Therapeutic Interventions Met (OT) yes;skilled treatment is necessary  -     Therapy Frequency (OT) 3 times/wk  -[facility] Name 11/15/22 1304          Therapy Plan Review/Discharge Plan (OT)    Anticipated Discharge Disposition (OT) home with home health;home with assist  -[facility] Name 11/15/22 1304          Vital Signs    Pre SpO2 (%) 95  -SM     O2 Delivery Pre Treatment room air  -     Post SpO2 (%) 96  -SM     O2 Delivery Post Treatment room air  -[facility] 11/15/22 1304          Positioning and Restraints    Pre-Treatment Position in bed  -     Post Treatment Position chair  -     In Chair reclined;call light within reach;encouraged to call for assist;exit alarm on;notified nsg  -           User Key  (r) = Recorded By, (t) = Taken By, (c) = Cosigned By    Initials Name Provider Type    Madelyn Dueñas OT Occupational Therapist               Outcome Measures     [facility] Name 11/15/22 1319          How much help from another is currently needed...    Putting on and taking off regular lower body clothing? 2  -SM     Bathing (including washing, rinsing, and drying) 2  -SM     Toileting (which includes using toilet bed pan or urinal) 3  -SM     Putting on and taking off regular upper body clothing 3  -SM     Taking care of personal grooming (such as brushing teeth) 3  -SM     Eating meals 4  -SM     AM-PAC 6 Clicks Score (OT) 17  -[facility] Name 11/15/22 1319          Functional Assessment    Outcome Measure Options AM-PAC 6 Clicks Daily Activity (OT)  -           User Key  (r) = Recorded By, (t) = Taken By, (c) = Cosigned By    Initials Name Provider Type    Madelyn Dueñas OT Occupational Therapist                 Occupational Therapy Education     Title: PT OT SLP Therapies (In Progress)     Topic: Occupational Therapy (In Progress)     Point: ADL training (Done)     Description:   Instruct learner(s) on proper safety adaptation and remediation techniques during self care or transfers.   Instruct in proper use of assistive devices.              Learning Progress Summary           Patient Acceptance, E, VU by  at 11/15/2022 1320    Comment: OT goals, POC, mobility recommendations with pt- encouraged up to chair X3 per day                   Point: Home exercise program (Not Started)     Description:   Instruct learner(s) on appropriate technique for monitoring, assisting and/or progressing therapeutic exercises/activities.              Learner Progress:  Not documented in this visit.          Point: Precautions (Not Started)     Description:   Instruct learner(s) on prescribed precautions during self-care and functional transfers.              Learner Progress:  Not documented in this visit.          Point: Body mechanics (Not Started)     Description:   Instruct learner(s) on proper positioning and spine alignment during self-care, functional mobility activities and/or exercises.              Learner Progress:  Not documented in this visit.                      User Key     Initials Effective Dates Name Provider Type Discipline     04/02/20 -  Madelyn Em, OT Occupational Therapist OT              OT Recommendation and Plan  Planned Therapy Interventions (OT): activity tolerance training, adaptive equipment training, BADL retraining, functional balance retraining, occupation/activity based interventions, patient/caregiver education/training, transfer/mobility retraining, strengthening exercise  Therapy Frequency (OT): 3 times/wk  Plan of Care Review  Plan of Care Reviewed With: patient  Outcome Evaluation: Pt is a 82 y.o male admitted from his independent living facility with acute exacerbationm of COPD. Pt is  "able to complete his ADLs modified independently at home but does report he has hx of spinal issues as well as pelvic fracture 1 year prior and still having mobility issues. He reports \"sometimes it takes me 20 minutes to get out of bed.\" Today pt requires CGA/Min A for mobility tasks. He has difficulty with ADLs today. Chronic pain issues noted and he reports 8/10 to 10/10 pain today. He also has a lot of coughing and overall slow pace during all tasks. He could benefit from continued OT to address deficits and increase his ADL performance back to PLOF, he appears to be functioning slightly below his baseline. He's goal is to dc back home in [facility] but may benefit from  OT as well.     Time Calculation:    Time Calculation- OT     Row Name 11/15/22 1320             Time Calculation- OT    OT Start Time 1004  -      OT Stop Time 1032  -      OT Time Calculation (min) 28 min  -SM      Total Timed Code Minutes- OT 20 minute(s)  -SM      OT Received On 11/15/22  -      OT - Next Appointment 11/17/22  -      OT Goal Re-Cert Due Date 11/29/22  -         Timed Charges    93399 - OT Self Care/Mgmt Minutes 20  -SM         Untimed Charges    OT Eval/Re-eval Minutes 8  -SM         Total Minutes    Timed Charges Total Minutes 20  -SM      Untimed Charges Total Minutes 8  -SM       Total Minutes 28  -SM            User Key  (r) = Recorded By, (t) = Taken By, (c) = Cosigned By    Initials Name Provider Type     Madelyn Em OT Occupational Therapist              Therapy Charges for Today     Code Description Service Date Service Provider Modifiers Qty    [id number]  OT SELF CARE/MGMT/TRAIN EA 15 MIN 11/15/2022 Madelyn Em OT GO 1    [id number]  OT EVAL MOD COMPLEXITY 2 11/15/2022 Madelyn Em OT GO 1               Madelyn Em OT  11/15/2022  "

## 2022-11-15 NOTE — PLAN OF CARE
Problem: Adult Inpatient Plan of Care  Goal: Patient-Specific Goal (Individualized)  Outcome: Ongoing, Progressing   Goal Outcome Evaluation:  Plan of Care Reviewed With: patient        Progress: improving  Outcome Evaluation: 2L O2 via NC overnight. RA during the day per day shift nursing report. Using urinal. po steroids. diuretics. No BM.

## 2022-11-15 NOTE — PROGRESS NOTES
Name: Tony Leonard ADMIT: 2022   : 1940  PCP: Erich Aviles MD    MRN: [id number] LOS: 1 days   AGE/SEX: 82 y.o. male  ROOM: [facility]     Subjective   Subjective   Patient seen and examined this morning. Hospital day 2.  Discussed with nursing and care team on interdisciplinary rounds this morning, patient with worsening dyspnea and productive cough of yellow sputum. Currently 2 L O2 via NC with O2 sat >90% (home oxygen requirements).      Review of Systems   Constitutional: Negative for chills and fever.   Respiratory: Positive for cough (Productive of yellow sputum), shortness of breath (With exertion) and wheezing.    Cardiovascular: Positive for leg swelling (improved). Negative for chest pain and palpitations.   Gastrointestinal: Negative for abdominal distention and abdominal pain.   Genitourinary: Negative for dysuria and hematuria.   Musculoskeletal: Negative for arthralgias and myalgias.   Neurological: Negative for dizziness and light-headedness.        Objective   Objective   Vital Signs  Temp:  [97.8 °F (36.6 °C)-98.1 °F (36.7 °C)] 97.9 °F (36.6 °C)  Heart Rate:  [] 103  Resp:  [16-20] 16  BP: (105-137)/(56-76) 137/68  SpO2:  [90 %-95 %] 95 %  on  Flow (L/min):  [2] 2;   Device (Oxygen Therapy): nasal cannula  Body mass index is 25.02 kg/m².  Physical Exam  Vitals and nursing note reviewed.   Constitutional:       General: He is awake. He is not in acute distress.     Comments: Elderly, frail appearing   HENT:      Head: Atraumatic.   Eyes:      General: No scleral icterus.     Conjunctiva/sclera: Conjunctivae normal.   Cardiovascular:      Rate and Rhythm: Normal rate and regular rhythm.      Pulses: Normal pulses.      Heart sounds: Normal heart sounds.   Pulmonary:      Effort: Pulmonary effort is normal. No respiratory distress.      Breath sounds: Decreased breath sounds, wheezing and rhonchi present.   Abdominal:      General: Bowel sounds are normal. There is no  distension.      Palpations: Abdomen is soft.      Tenderness: There is no abdominal tenderness.   Musculoskeletal:      Right lower leg: Edema present.      Left lower leg: Edema present.   Skin:     General: Skin is warm and dry.      Comments: Chronic venous stasis changes B/L LE   Neurological:      General: No focal deficit present.      Mental Status: He is alert and oriented to person, place, and time.   Psychiatric:         Behavior: Behavior is cooperative.       Results Review:  I reviewed the patient's new clinical results.  I personally viewed and interpreted the patient's EKG/Telemetry data    Results from last 7 days   Lab Units 11/15/22  0525 11/14/22  0609 11/13/22  1437   WBC 10*3/mm3 15.14* 5.48 8.69   HEMOGLOBIN g/dL 11.7* 12.8* 13.2   PLATELETS 10*3/mm3 200 203 205     Results from last 7 days   Lab Units 11/15/22  0525 11/14/22  0609 11/13/22  1437   SODIUM mmol/L 139 140 142   POTASSIUM mmol/L 4.0 3.8 4.8   CHLORIDE mmol/L 104 104 108*   CO2 mmol/L 26.0 21.8* 21.5*   BUN mg/dL 34* 24* 28*   CREATININE mg/dL 1.04 1.10 1.18   GLUCOSE mg/dL 151* 233* 98   EGFR mL/min/1.73 70.8 66.2 60.8     Results from last 7 days   Lab Units 11/14/22  0609 11/13/22  1437   ALBUMIN g/dL 3.90 3.80   BILIRUBIN mg/dL 0.3 0.4   ALK PHOS U/L 75 86   AST (SGOT) U/L 18 27   ALT (SGPT) U/L 15 18     Results from last 7 days   Lab Units 11/15/22  0525 11/14/22  0609 11/13/22  1437   CALCIUM mg/dL 8.9 9.4 9.0   ALBUMIN g/dL  --  3.90 3.80       Glucose   Date/Time Value Ref Range Status   11/15/2022 1133 113 70 - 130 mg/dL Final     Comment:     Meter: [id number] : [id number] Clemente Ortiz NA   11/15/2022 0531 128 70 - 130 mg/dL Final     Comment:     Meter: [id number] : [id number] Silvestre MEDINA   11/14/2022 2103 164 (H) 70 - 130 mg/dL Final     Comment:     Meter: [id number] : [id number] Silvestre MEDINA   11/14/2022 1619 165 (H) 70 - 130 mg/dL Final     Comment:     Meter: [id number] :  [id number] Veronique MEDINA   11/14/2022 1105 128 70 - 130 mg/dL Final     Comment:     Meter: [id number] : [id number] Veronique MEDINA       No radiology results for the last day     Results for orders placed during the hospital encounter of 06/28/17    Adult Transthoracic Echo Complete    Interpretation Summary  · Left ventricular systolic function is normal. Calculated EF = 56.4%.  · Left ventricular diastolic dysfunction is noted (grade I) consistent with impaired relaxation  · Mild aortic valve regurgitation is present.  · Mild mitral valve regurgitation is present  · There is no evidence of pericardial effusion.        Scheduled Medications  azithromycin, 500 mg, Oral, Daily   Followed by  [START ON 11/16/2022] azithromycin, 250 mg, Oral, Daily  cefTRIAXone, 1 g, Intravenous, Q24H  FLUoxetine, 20 mg, Oral, Daily  furosemide, 40 mg, Oral, Daily  gabapentin, 300 mg, Oral, TID  insulin lispro, 0-7 Units, Subcutaneous, TID With Meals  ipratropium-albuterol, 3 mL, Nebulization, Q6H While Awake - RT  methylphenidate, 10 mg, Oral, Daily  Mirabegron ER, 25 mg, Oral, Daily  pantoprazole, 40 mg, Oral, BID  polyethylene glycol, 17 g, Oral, Daily  predniSONE, 40 mg, Oral, Daily With Breakfast  rOPINIRole, 0.5 mg, Oral, Nightly  sodium chloride, 10 mL, Intravenous, Q12H  sucralfate, 1 g, Oral, 4x Daily AC & at Bedtime    Infusions   Diet  Diet Regular Texture (IDDSI 7); Regular Consistency; Regular/House Diet, Cardiac Diets; Healthy Heart (2-3 Na+)       Assessment/Plan     Active Hospital Problems    Diagnosis  POA   • **Acute exacerbation of chronic obstructive pulmonary disease (COPD) (HCC) [J44.1]  Yes   • Leukocytosis [D72.829]  Yes   • Overweight (BMI 25.0-29.9) [E66.3]  Yes   • Hiatal hernia [K44.9]  Yes   • Rectal bleed [K62.5]  Yes   • Spinal stenosis, lumbar region with neurogenic claudication [M48.062]  Yes   • COPD exacerbation (HCC) [J44.1]  Yes   • Peripheral neuropathy [G62.9]  Yes   • Chronic  diastolic (congestive) heart failure ([facility]) [I50.32]  Yes   • Prostate cancer ([facility]) [C61]  Yes   • ADD (attention deficit disorder) [F98.8]  Yes   • Hemorrhoids [K64.9]  Yes      Resolved Hospital Problems   No resolved problems to display.       82 y.o. male admitted with Acute exacerbation of chronic obstructive pulmonary disease (COPD) ([facility]).    Chronic obstructive pulmonary disease with chronic respiratory failure (On 2L home O2) with acute exacerbation complicated by new Leukocytosis  - Etiology likely multifactorial in the setting of medication noncompliance and comorbid medical conditions.  - CXR on admission negative for evidence of focal consolidation and/or interstitial pulmonary opacities.  - On initial examination, patient did not have signs/symptoms to suggest concomitant pneumonia warranting antibiotic therapy, however on repeat examination this afternoon, patient appears much more uncomfortable with more labored respirations, coupled with increasing productive cough of yellow sputum and dyspnea.  WBC count elevated to 15 on most recent labs, elevated from prior, this could be due to prednisone, however, given worsening symptoms, there is concern for possible concomitant acquired pneumonia warranting further evaluation and adjustment to treatment regimen at this time.  Plan:  - Order repeat chest x-ray, procalcitonin, blood cultures, sputum cultures, strep pneumo and Legionella urine antigens.  - Start empiric antibiotic therapy with ceftriaxone and azithromycin for concern of CAP.  - Continue prednisone and inhalers as currently scheduled, Duonebs q6H scheduled + albuterol q4H PRN.  - Order tessalon pearls, robitussin for symptom control.  - Follow up results of infectious workup to guide ongoing management decisions.  - Order repeat CBC in AM for reassessment.  - Supplemental oxygen PRN to maintain O2 sat 88 to 92%.  - Continuous pulse oximetry and telemetry monitoring.  - Will need home oxygen  evaluation and scheduled pulmonology follow up prior to discharge.    Chronic diastolic congestive heart failure  - Most recent 2D echocardiogram showing estimated EF 56.4% with grade 1 diastolic dysfunction.  - Patient felt to be hypervolemic on admission on physical exam on admission, likely from nonadherence to diuretics as outpatient.  Subsequently given IV Lasix leading to edema and good response.  - Examination secondary patient appears stable from a cardiac perspective overall, does have bilateral lower extremity edema, however, per patient it is decreased from prior.  Respiratory status is stable, no evidence of bilateral interstitial opacities on initial chest x-ray to suggest pulmonary vascular congestion from heart failure exacerbation.  - Continue home furosemide as previously prescribed.  - Close monitoring of renal function and electrolytes,  2g sodium diet, daily weights, strict I/O, continuous pulse oximetry,telemetry monitoring, elevate HOB, supplemental O2 PRN for O2 sat <90%.    GERD/Radiation proctitis/Sigmoid diverticulosis/Colonic angioectasias  - Diagnoses as above, noted on EGD and Colonoscopy from September 2022.  - Appears stable at this time, no evidence of GI bleeding, symptoms of GERD well controlled. Continue PPI and sucralfate as currently prescribed.      · Lovenox 40 mg SC daily for DVT prophylaxis.  · Full code.  · Discussed with patient, nursing staff, CCP and care team on multidisciplinary rounds.  · Anticipate discharge TBD timing yet to be determined.      Scar Tirado DO  [facility]  11/15/22  14:58 EST

## 2022-11-15 NOTE — CONSULTS
Date of Hospital Visit: 2022  Date of consult: 11/15/2022  Encounter Provider: Ankit Gonzalez MD  Place of Service: [facility] CARDIOLOGY  Patient Name: Tony Leonard  :1940  Referral Provider: Danni Frye MD    Chief complaint    Reason for consult: PVCs, shortness of breath    History of Present Illness    Mr. Leonard is admitted with diagnosis of acute COPD exacerbation after presenting with worsening shortness of breath and cough.  Subsequently was started on steroid, bronchodilator therapy.  He was also was restarted on Lasix that he takes for chronic diastolic CHF and extremity edema.    He reports improved breathing.  He complains about hip pain during coughing.  He denies any chest pain, palpitations, presyncope or syncope.  Bilateral lower extremity swelling improving after he was restarted on Lasix.    Telemetry noted to have occasional PVCs.  No significant tachyarrhythmias.        Past Medical History:   Diagnosis Date   • ADHD (attention deficit hyperactivity disorder)    • Bronchiectasis (HCC)    • Colon polyp    • COPD (chronic obstructive pulmonary disease) (HCC)    • Diverticulosis    • Hard of hearing    • On home oxygen therapy     2 LITERS   • Pneumonia    • Prostate cancer (HCC) 2019   • Spinal stenosis, lumbar region with neurogenic claudication 2022       Past Surgical History:   Procedure Laterality Date   • BRONCHOSCOPY N/A 2016    Procedure: BRONCHOSCOPY with BAL of right lower lobe and left  lower lobe.  ;  Surgeon: Nando Diaz MD;  Location: [facility] ENDOSCOPY;  Service:    • BRONCHOSCOPY N/A 2017    Procedure: BRONCHOSCOPY;  Surgeon: Katharina Salter MD;  Location: [facility] ENDOSCOPY;  Service:    • BRONCHOSCOPY Bilateral 2017    Procedure: BRONCHOSCOPY WITH WASHINGS;  Surgeon: Katharina Salter MD;  Location: [facility] ENDOSCOPY;  Service:    • BRONCHOSCOPY N/A 2018    Procedure: BRONCHOSCOPY AT BEDSIDE  with BAL;  Surgeon: Katharina Salter MD;  Location: [facility] ENDOSCOPY;  Service:    • BRONCHOSCOPY N/A 1/30/2018    Procedure: BRONCHOSCOPY with BAL and washing;  Surgeon: Shreyas Wild MD;  Location: [facility] ENDOSCOPY;  Service:    • BRONCHOSCOPY Bilateral 4/24/2018    Procedure: BRONCHOSCOPY WITH WASHING;  Surgeon: Katharina Salter MD;  Location: [facility] ENDOSCOPY;  Service: Pulmonary   • BRONCHOSCOPY N/A 12/28/2019    Procedure: BRONCHOSCOPY with bilateral washing;  Surgeon: Katharina Salter MD;  Location: [facility] ENDOSCOPY;  Service: Pulmonary   • COLONOSCOPY  05/17/2013    eh, ih, tort, sig tics   • COLONOSCOPY N/A 5/10/2019    Non-thrombosed external hemorrhoids found on perianal exam, Diverticulosis, Tortuous colon, One 5 mm polyp in the mid ascending colon, IH. Path: Tubular adenoma.    • COLONOSCOPY N/A 9/24/2022    Procedure: COLONOSCOPY to cecum and TI with argon plasma coagulation.;  Surgeon: Bruce Black MD;  Location: [facility] ENDOSCOPY;  Service: Gastroenterology;  Laterality: N/A;  pre- GI bleeding  post- radiation proctitis, diverticulosis   • ENDOSCOPY  03/19/2015    z line irreg, hh   • ENDOSCOPY N/A 9/24/2022    Procedure: ESOPHAGOGASTRODUODENOSCOPY;  Surgeon: Bruce Black MD;  Location: [facility] ENDOSCOPY;  Service: Gastroenterology;  Laterality: N/A;  pre- GI bleeding  post- esophagitis, hiatal hernia   • HIP OPEN REDUCTION Right 1/17/2018    Procedure: HIP OPEN REDUCTION INTERNAL FIXATION WITH DYNAMIC HIP SCREW;  Surgeon: Les Black MD;  Location: [facility] OR;  Service:    • SPINE SURGERY     • TONSILLECTOMY     • TOTAL HIP ARTHROPLASTY REVISION Right 9/23/2019    Procedure: HIP  REVISION RIGHT;  Surgeon: Isauro Warner MD;  Location: [facility] MAIN OR;  Service: Orthopedics       Medications Prior to Admission   Medication Sig Dispense Refill Last Dose   • acetaminophen (TYLENOL) 500 MG tablet Take 500 mg by mouth Every 6 (Six) Hours As Needed for Mild Pain.   Past Week   • albuterol  (ACCUNEB) 1.25 MG/3ML nebulizer solution Inhale 1 ampule.   Past Week   • albuterol (PROVENTIL) (2.5 MG/3ML) 0.083% nebulizer solution Take 2.5 mg by nebulization 4 (Four) Times a Day. (Patient taking differently: Take 2.5 mg by nebulization 2 (Two) Times a Day.) 360 mL 3 Past Week   • Anoro Ellipta 62.5-25 MCG/INH aerosol powder  inhaler USE 1 INHALATION DAILY 180 each 3 Past Week   • budesonide (PULMICORT) 0.5 MG/2ML nebulizer solution Take 2 mL by nebulization 2 (Two) Times a Day. 1 vial only twice daily 30 each 3 Past Week   • calcium carbonate (TUMS) 500 MG chewable tablet Chew 1 tablet As Needed for Indigestion or Heartburn.   Past Week   • colestipol (COLESTID) 5 g granules Take 5 g by mouth 2 (Two) Times a Day. Prn after diarrhea 500 g 3 Past Week   • FLUoxetine (PROzac) 20 MG capsule TAKE 1 CAPSULE DAILY 90 capsule 3 Past Week   • furosemide (LASIX) 40 MG tablet Take 40 mg by mouth.   Past Month   • gabapentin (NEURONTIN) 300 MG capsule Take 1 capsule by mouth 3 (Three) Times a Day. 9 capsule 0 Past Week   • methylphenidate (Ritalin) 10 MG tablet Take 1 tablet by mouth Daily. ADD 30 tablet 0 Past Week   • Misc. Devices (Rollator Ultra-Light) misc 1 application Daily. Severe DJD 1 each 0 Past Month   • Multiple Vitamins-Minerals (MULTIVITAMIN ADULT PO) Take 1 tablet by mouth Daily.   Past Week   • OXYGEN-HELIUM IN Inhale 2 L Daily As Needed.   Past Week   • pantoprazole (PROTONIX) 40 MG EC tablet Take 1 tablet by mouth Daily. (Patient taking differently: Take 1 tablet by mouth 2 (Two) Times a Day.) 90 tablet 3 Past Week   • polyethylene glycol 17 g packet DISSOLVE ONE PACKET IN 8OZ LIQUID & DRINK BY MOUTH DAILY 28 each 10 Past Week   • potassium chloride (K-DUR,KLOR-CON) 20 MEQ CR tablet Take 1 tablet by mouth Daily.   Past Week   • rOPINIRole (REQUIP) 0.5 MG tablet TAKE 1 TO 2 TABLETS EVERY NIGHT 1 HOUR BEFORE BEDTIME (Patient taking differently: 2 tablets.) 90 tablet 7 Past Week   • sodium chloride 7 %  nebulizer solution nebulizer solution    Past Week   • sucralfate (Carafate) 1 GM/10ML suspension Take 10 mL by mouth 4 (Four) Times a Day. 1200 mL 0 Past Month   • terbinafine (lamiSIL) 250 MG tablet    Past Week   • TiZANidine (ZANAFLEX) 6 MG capsule TAKE 1 CAPSULE EVERY NIGHT 90 capsule 3 Past Week   • Turmeric 500 MG capsule Take  by mouth Daily.   Past Week   • vitamin B-12 (CYANOCOBALAMIN) 2500 MCG sublingual tablet tablet Take 2,500 mcg by mouth Every Evening.   Past Week   • Camphor-Menthol-Methyl Sal (Salonpas) 3.1-6-10 % patch Apply 3 % topically Every 12 (Twelve) Hours.   Unknown       Current Meds  Scheduled Meds:FLUoxetine, 20 mg, Oral, Daily  furosemide, 40 mg, Oral, Daily  gabapentin, 300 mg, Oral, TID  insulin lispro, 0-7 Units, Subcutaneous, TID With Meals  ipratropium-albuterol, 3 mL, Nebulization, Q6H While Awake - RT  methylphenidate, 10 mg, Oral, Daily  Mirabegron ER, 25 mg, Oral, Daily  pantoprazole, 40 mg, Oral, BID  polyethylene glycol, 17 g, Oral, Daily  predniSONE, 40 mg, Oral, Daily With Breakfast  rOPINIRole, 0.5 mg, Oral, Nightly  sodium chloride, 10 mL, Intravenous, Q12H  sucralfate, 1 g, Oral, 4x Daily AC & at Bedtime      Continuous Infusions:   PRN Meds:.•  acetaminophen **OR** acetaminophen **OR** acetaminophen  •  albuterol  •  benzonatate  •  calcium carbonate  •  dextrose  •  dextrose  •  glucagon (human recombinant)  •  nitroglycerin  •  ondansetron  •  sodium chloride  •  sodium chloride    Allergies as of 11/13/2022 - Reviewed 11/13/2022   Allergen Reaction Noted   • Daliresp [roflumilast] Anaphylaxis 12/14/2018   • Latex Rash 03/10/2016   • Sulfa antibiotics Rash 03/10/2016       Social History     Socioeconomic History   • Marital status: Single   Tobacco Use   • Smoking status: Never   • Smokeless tobacco: Never   Vaping Use   • Vaping Use: Never used   Substance and Sexual Activity   • Alcohol use: No     Comment: red wine occassional   • Drug use: No   • Sexual activity:  "Defer       Family History   Problem Relation Age of Onset   • Thyroid disease Mother    • No Known Problems Father    • Malig Hyperthermia Neg Hx        REVIEW OF SYSTEMS:   All systems reviewed and pertinent positives include in HPI otherwise negative review of systems.       Objective:   Temp:  [97.5 °F (36.4 °C)-98.1 °F (36.7 °C)] 98 °F (36.7 °C)  Heart Rate:  [72-99] 72  Resp:  [16-20] 16  BP: (105-132)/(56-76) 132/76  Body mass index is 25.02 kg/m².  Flowsheet Rows    Flowsheet Row First Filed Value   Admission Height 180.3 cm (71\") Documented at 11/13/2022 1627   Admission Weight 81.6 kg (180 lb) Documented at 11/13/2022 1627        Vitals:    11/15/22 0802   BP: 132/76   Pulse: 72   Resp: 16   Temp: 98 °F (36.7 °C)   SpO2: 94%       General Appearance:    Alert, cooperative, on supplemental oxygen through nasal cannula   Head:    Normocephalic, without obvious abnormality, atraumatic   Eyes:            Lids and lashes normal, conjunctivae and sclerae normal, no   icterus, no pallor, corneas clear, PERRLA   Ears:    Ears appear intact with no abnormalities noted   Throat:   No oral lesions, no thrush, oral mucosa moist   Neck:   No adenopathy, supple, trachea midline, no thyromegaly, no   carotid bruit, no JVD   Back:     No kyphosis present, no scoliosis present, no skin lesions, erythema or scars, no tenderness to percussion or palpation, range of motion normal   Lungs:    Bilateral diffuse and inspiratory and expiratory wheezing    Heart:    Regular rhythm and normal rate, normal S1 and S2, no murmur, no gallop, no rub, no click   Chest Wall:    No abnormalities observed   Abdomen:     Normal bowel sounds, no masses, no organomegaly, soft nontender, nondistended, no guarding, no rebound  tenderness   Extremities:   Moves all extremities well, no edema, no cyanosis, no redness   Pulses:   Pulses palpable and equal bilaterally. Normal radial, carotid, femoral, dorsalis pedis and posterior tibial pulses " bilaterally. Normal abdominal aorta   Skin:  Neurology:   Psychiatric:   No bleeding, bruising or rash   Normal speech and cranial nerve exam, no focal deficit   Alert and oriented x 3, normal mood and affect                 Review of Data:      Results from last 7 days   Lab Units 11/15/22  0525 11/14/22  0609   SODIUM mmol/L 139 140   POTASSIUM mmol/L 4.0 3.8   CHLORIDE mmol/L 104 104   CO2 mmol/L 26.0 21.8*   BUN mg/dL 34* 24*   CREATININE mg/dL 1.04 1.10   CALCIUM mg/dL 8.9 9.4   BILIRUBIN mg/dL  --  0.3   ALK PHOS U/L  --  75   ALT (SGPT) U/L  --  15   AST (SGOT) U/L  --  18   GLUCOSE mg/dL 151* 233*     Results from last 7 days   Lab Units 11/13/22  1437   TROPONIN T ng/mL <0.010     @LABRCNTbnp@  Results from last 7 days   Lab Units 11/15/22  0525 11/14/22  0609 11/13/22  1437   WBC 10*3/mm3 15.14* 5.48 8.69   HEMOGLOBIN g/dL 11.7* 12.8* 13.2   HEMATOCRIT % 35.1* 37.8 39.8   PLATELETS 10*3/mm3 200 203 205             @LABRCNTIP(chol,trig,hdl,ldl)    I personally viewed and interpreted the patient's EKG/Telemetry data  )  Patient Active Problem List   Diagnosis   • Thrush, oral   • ADD (attention deficit disorder)   • Hemorrhoids   • Bronchiectasis with acute lower respiratory infection ([facility])   • Diverticul disease small and large intestine, no perforati or abscess   • Benign non-nodular prostatic hyperplasia without lower urinary tract symptoms   • Gastroesophageal reflux disease   • Malaise and fatigue   • Environmental allergies   • Hemoptysis   • Dyslipidemia   • Primary osteoarthritis involving multiple joints   • Pneumonia of right lower lobe due to infectious organism   • Abnormal EKG   • COPD (chronic obstructive pulmonary disease) ([facility])   • Mild malnutrition ([facility])   • Acute on chronic respiratory failure with hypoxia ([facility])   • Fracture, intertrochanteric, right femur, closed, initial encounter ([facility])   • Chronic diastolic CHF (congestive heart failure) ([facility])   • Hematoma of frontal scalp   •  Postoperative anemia due to acute blood loss   • Urinary retention   • Hemorrhagic disorder due to circulating anticoagulants (HCC)   • History of fracture of right hip   • Closed fracture of right hip with routine healing   • Chronic low back pain with sciatica   • Change in bowel function   • Rectal bleeding   • Prostate cancer (HCC)   • On home oxygen therapy   • Acute viral bronchitis   • Chronic diastolic (congestive) heart failure (HCC)   • Peripheral neuropathy   • Plantar fasciitis   • COPD exacerbation ([facility])   • Bilateral lower extremity edema   • Microscopic hematuria   • Acute midline low back pain with right-sided sciatica   • Spinal stenosis, lumbar region with neurogenic claudication   • Compression deformity of vertebra   • Rectal bleed   • Esophagitis   • Angiectasia   • Hiatal hernia   • Acute exacerbation of chronic obstructive pulmonary disease (COPD) ([facility])   • Overweight (BMI 25.0-29.9)         Assessment and Plan:    1.  Acute COPD exacerbation with shortness of breath and bilateral wheezing.  Overall improving with steroid and bronchodilator therapy.  2.  Occasional PVCs without any tachyarrhythmia  3.  Chronic diastolic CHF  -Except for lower extremity swelling he is not overtly volume overloaded.  Chest x-ray negative for pulmonary edema.  -Agree with resumption of home Lasix.    He does not need any further cardiac testing.  Continue telemetry monitoring.  Recommend PT/OT    I have discussed patient with his nurse  Cardiology will sign off but call with any questions.  Thank you for consulting with cardiology and allowing me to participate in care of this patient.       Ankit Gonzalez MD  11/15/22  08:59 EST.  Time spent in reviewing chart, discussion and examination:

## 2022-11-15 NOTE — PLAN OF CARE
"Goal Outcome Evaluation:  Plan of Care Reviewed With: patient           Outcome Evaluation: Pt is a 82 y.o male admitted from his independent living facility with acute exacerbationm of COPD. Pt is able to complete his ADLs modified independently at home but does report he has hx of spinal issues as well as pelvic fracture 1 year prior and still having mobility issues. He reports \"sometimes it takes me 20 minutes to get out of bed.\" Today pt requires CGA/Min A for mobility tasks. He has difficulty with ADLs today. Chronic pain issues noted and he reports 8/10 to 10/10 pain today. He also has a lot of coughing and overall slow pace during all tasks. He could benefit from continued OT to address deficits and increase his ADL performance back to PLOF, he appears to be functioning slightly below his baseline. He's goal is to dc back home in IL[facility] but may benefit from  OT as well.    OT wore appropriate PPE and completed hand hygiene.   "

## 2022-11-16 LAB
ANION GAP SERPL CALCULATED.3IONS-SCNC: 10.9 MMOL/L (ref 5–15)
BASOPHILS # BLD AUTO: 0.01 10*3/MM3 (ref 0–0.2)
BASOPHILS NFR BLD AUTO: 0.1 % (ref 0–1.5)
BUN SERPL-MCNC: 35 MG/DL (ref 8–23)
BUN/CREAT SERPL: 32.4 (ref 7–25)
CALCIUM SPEC-SCNC: 8.7 MG/DL (ref 8.6–10.5)
CHLORIDE SERPL-SCNC: 103 MMOL/L (ref 98–107)
CO2 SERPL-SCNC: 28.1 MMOL/L (ref 22–29)
CREAT SERPL-MCNC: 1.08 MG/DL (ref 0.76–1.27)
D-LACTATE SERPL-SCNC: 2 MMOL/L (ref 0.5–2)
D-LACTATE SERPL-SCNC: 2.1 MMOL/L (ref 0.5–2)
D-LACTATE SERPL-SCNC: 2.2 MMOL/L (ref 0.5–2)
DEPRECATED RDW RBC AUTO: 42.2 FL (ref 37–54)
EGFRCR SERPLBLD CKD-EPI 2021: 67.7 ML/MIN/1.73
EOSINOPHIL # BLD AUTO: 0 10*3/MM3 (ref 0–0.4)
EOSINOPHIL NFR BLD AUTO: 0 % (ref 0.3–6.2)
ERYTHROCYTE [DISTWIDTH] IN BLOOD BY AUTOMATED COUNT: 11.9 % (ref 12.3–15.4)
GLUCOSE BLDC GLUCOMTR-MCNC: 109 MG/DL (ref 70–130)
GLUCOSE BLDC GLUCOMTR-MCNC: 86 MG/DL (ref 70–130)
GLUCOSE BLDC GLUCOMTR-MCNC: 90 MG/DL (ref 70–130)
GLUCOSE SERPL-MCNC: 126 MG/DL (ref 65–99)
HCT VFR BLD AUTO: 36.7 % (ref 37.5–51)
HGB BLD-MCNC: 12 G/DL (ref 13–17.7)
IMM GRANULOCYTES # BLD AUTO: 0.1 10*3/MM3 (ref 0–0.05)
IMM GRANULOCYTES NFR BLD AUTO: 0.7 % (ref 0–0.5)
LYMPHOCYTES # BLD AUTO: 0.95 10*3/MM3 (ref 0.7–3.1)
LYMPHOCYTES NFR BLD AUTO: 6.6 % (ref 19.6–45.3)
MCH RBC QN AUTO: 31.7 PG (ref 26.6–33)
MCHC RBC AUTO-ENTMCNC: 32.7 G/DL (ref 31.5–35.7)
MCV RBC AUTO: 97.1 FL (ref 79–97)
MONOCYTES # BLD AUTO: 0.52 10*3/MM3 (ref 0.1–0.9)
MONOCYTES NFR BLD AUTO: 3.6 % (ref 5–12)
NEUTROPHILS NFR BLD AUTO: 12.89 10*3/MM3 (ref 1.7–7)
NEUTROPHILS NFR BLD AUTO: 89 % (ref 42.7–76)
NRBC BLD AUTO-RTO: 0 /100 WBC (ref 0–0.2)
PLATELET # BLD AUTO: 215 10*3/MM3 (ref 140–450)
PMV BLD AUTO: 9.9 FL (ref 6–12)
POTASSIUM SERPL-SCNC: 3.6 MMOL/L (ref 3.5–5.2)
RBC # BLD AUTO: 3.78 10*6/MM3 (ref 4.14–5.8)
SODIUM SERPL-SCNC: 142 MMOL/L (ref 136–145)
WBC NRBC COR # BLD: 14.47 10*3/MM3 (ref 3.4–10.8)

## 2022-11-16 PROCEDURE — 82962 GLUCOSE BLOOD TEST: CPT

## 2022-11-16 PROCEDURE — 83605 ASSAY OF LACTIC ACID: CPT | Performed by: STUDENT IN AN ORGANIZED HEALTH CARE EDUCATION/TRAINING PROGRAM

## 2022-11-16 PROCEDURE — 94799 UNLISTED PULMONARY SVC/PX: CPT

## 2022-11-16 PROCEDURE — 94664 DEMO&/EVAL PT USE INHALER: CPT

## 2022-11-16 PROCEDURE — 85025 COMPLETE CBC W/AUTO DIFF WBC: CPT | Performed by: STUDENT IN AN ORGANIZED HEALTH CARE EDUCATION/TRAINING PROGRAM

## 2022-11-16 PROCEDURE — 25010000002 CEFTRIAXONE PER 250 MG: Performed by: STUDENT IN AN ORGANIZED HEALTH CARE EDUCATION/TRAINING PROGRAM

## 2022-11-16 PROCEDURE — 63710000001 PREDNISONE PER 1 MG: Performed by: STUDENT IN AN ORGANIZED HEALTH CARE EDUCATION/TRAINING PROGRAM

## 2022-11-16 PROCEDURE — 25010000002 ENOXAPARIN PER 10 MG: Performed by: STUDENT IN AN ORGANIZED HEALTH CARE EDUCATION/TRAINING PROGRAM

## 2022-11-16 PROCEDURE — 80048 BASIC METABOLIC PNL TOTAL CA: CPT | Performed by: STUDENT IN AN ORGANIZED HEALTH CARE EDUCATION/TRAINING PROGRAM

## 2022-11-16 PROCEDURE — 94760 N-INVAS EAR/PLS OXIMETRY 1: CPT

## 2022-11-16 PROCEDURE — 94761 N-INVAS EAR/PLS OXIMETRY MLT: CPT

## 2022-11-16 RX ADMIN — GABAPENTIN 300 MG: 300 CAPSULE ORAL at 20:21

## 2022-11-16 RX ADMIN — IPRATROPIUM BROMIDE AND ALBUTEROL SULFATE 3 ML: 2.5; .5 SOLUTION RESPIRATORY (INHALATION) at 08:54

## 2022-11-16 RX ADMIN — MIRABEGRON 25 MG: 25 TABLET, FILM COATED, EXTENDED RELEASE ORAL at 09:46

## 2022-11-16 RX ADMIN — SUCRALFATE 1 G: 1 TABLET ORAL at 06:30

## 2022-11-16 RX ADMIN — GUAIFENESIN 200 MG: 100 SOLUTION ORAL at 18:12

## 2022-11-16 RX ADMIN — ACETAMINOPHEN 650 MG: 325 TABLET, FILM COATED ORAL at 18:12

## 2022-11-16 RX ADMIN — CEFTRIAXONE SODIUM 1 G: 1 INJECTION, POWDER, FOR SOLUTION INTRAMUSCULAR; INTRAVENOUS at 18:12

## 2022-11-16 RX ADMIN — GABAPENTIN 300 MG: 300 CAPSULE ORAL at 09:46

## 2022-11-16 RX ADMIN — FLUOXETINE HYDROCHLORIDE 20 MG: 20 CAPSULE ORAL at 09:46

## 2022-11-16 RX ADMIN — SUCRALFATE 1 G: 1 TABLET ORAL at 22:59

## 2022-11-16 RX ADMIN — FUROSEMIDE 40 MG: 40 TABLET ORAL at 09:46

## 2022-11-16 RX ADMIN — AZITHROMYCIN DIHYDRATE 250 MG: 250 TABLET, FILM COATED ORAL at 18:12

## 2022-11-16 RX ADMIN — BENZONATATE 200 MG: 100 CAPSULE ORAL at 18:12

## 2022-11-16 RX ADMIN — GABAPENTIN 300 MG: 300 CAPSULE ORAL at 18:12

## 2022-11-16 RX ADMIN — Medication 10 ML: at 09:47

## 2022-11-16 RX ADMIN — PANTOPRAZOLE SODIUM 40 MG: 40 TABLET, DELAYED RELEASE ORAL at 20:21

## 2022-11-16 RX ADMIN — BENZONATATE 200 MG: 100 CAPSULE ORAL at 09:45

## 2022-11-16 RX ADMIN — PANTOPRAZOLE SODIUM 40 MG: 40 TABLET, DELAYED RELEASE ORAL at 09:46

## 2022-11-16 RX ADMIN — IPRATROPIUM BROMIDE AND ALBUTEROL SULFATE 3 ML: 2.5; .5 SOLUTION RESPIRATORY (INHALATION) at 13:04

## 2022-11-16 RX ADMIN — ENOXAPARIN SODIUM 40 MG: 100 INJECTION SUBCUTANEOUS at 20:21

## 2022-11-16 RX ADMIN — Medication 10 ML: at 20:21

## 2022-11-16 RX ADMIN — IPRATROPIUM BROMIDE AND ALBUTEROL SULFATE 3 ML: 2.5; .5 SOLUTION RESPIRATORY (INHALATION) at 19:33

## 2022-11-16 RX ADMIN — ROPINIROLE HYDROCHLORIDE 0.5 MG: 0.5 TABLET, FILM COATED ORAL at 20:21

## 2022-11-16 RX ADMIN — SUCRALFATE 1 G: 1 TABLET ORAL at 18:12

## 2022-11-16 RX ADMIN — PREDNISONE 40 MG: 20 TABLET ORAL at 09:46

## 2022-11-16 RX ADMIN — SUCRALFATE 1 G: 1 TABLET ORAL at 13:58

## 2022-11-16 RX ADMIN — METHYLPHENIDATE HYDROCHLORIDE 10 MG: 5 TABLET ORAL at 09:46

## 2022-11-16 RX ADMIN — GUAIFENESIN 200 MG: 100 SOLUTION ORAL at 09:45

## 2022-11-16 RX ADMIN — ACETAMINOPHEN 650 MG: 325 TABLET, FILM COATED ORAL at 09:46

## 2022-11-16 NOTE — PLAN OF CARE
Pt. Slightly tachycardic at times, HR high as low 100s with exertion.  Pt. A&O x4.  Pt. Up with assist x1 with walker, receiving oral lasix, adequate UOP.  Pt. With increased persistent cough, some improvement with PO cough meds.  Pt. With bilateral hip pain when coughing, some relief with Tylenol.  Pt. Had cxray today, see results.  Pt. Started on PO and IV abx.  Pt. On oral steroids.  Pt. Encouraged to use IS.  Pt. Started on Lovenox for DVT.  Urine and sputum sent.  Pt. Resting comfortably at present, will continue to monitor closely.      Problem: Adult Inpatient Plan of Care  Goal: Plan of Care Review  Outcome: Ongoing, Progressing  Flowsheets (Taken 11/15/2022 1906)  Progress: no change  Plan of Care Reviewed With: patient

## 2022-11-16 NOTE — PROGRESS NOTES
Name: Tony Leoanrd ADMIT: 2022   : 1940  PCP: Erich Aviles MD    MRN: [id number] LOS: 2 days   AGE/SEX: 82 y.o. male  ROOM: [facility]     Subjective   Subjective   Patient seen and examined this morning. Hospital day 3.  Discussed with nursing and care team on interdisciplinary rounds this morning, patient doing better overall today when compared to prior. Dyspnea and cough improving. Currently 2 L O2 via NC with O2 sat >90% (home oxygen requirements).      Review of Systems   Constitutional: Negative for chills and fever.   Respiratory: Positive for cough (improving), shortness of breath (With exertion, improving) and wheezing.    Cardiovascular: Negative for chest pain and palpitations. Leg swelling: improved.   Gastrointestinal: Negative for abdominal distention and abdominal pain.   Genitourinary: Negative for dysuria and hematuria.   Musculoskeletal: Negative for arthralgias and myalgias.   Neurological: Negative for dizziness and light-headedness.        Objective   Objective   Vital Signs  Temp:  [97.9 °F (36.6 °C)-98.1 °F (36.7 °C)] 98.1 °F (36.7 °C)  Heart Rate:  [] 82  Resp:  [16-20] 18  BP: (112-137)/(58-76) 134/71  SpO2:  [90 %-95 %] 94 %  on  Flow (L/min):  [2] 2;   Device (Oxygen Therapy): room air  Body mass index is 25.02 kg/m².  Physical Exam  Vitals and nursing note reviewed.   Constitutional:       General: He is awake. He is not in acute distress.     Comments: Elderly, frail appearing   HENT:      Head: Atraumatic.   Eyes:      General: No scleral icterus.     Conjunctiva/sclera: Conjunctivae normal.   Cardiovascular:      Rate and Rhythm: Normal rate and regular rhythm.      Pulses: Normal pulses.      Heart sounds: Normal heart sounds.   Pulmonary:      Effort: Pulmonary effort is normal. No respiratory distress.      Breath sounds: Decreased breath sounds and wheezing (scattered) present.   Abdominal:      General: Bowel sounds are normal.      Palpations:  Abdomen is soft.      Tenderness: There is no abdominal tenderness.   Musculoskeletal:      Right lower leg: Edema present.      Left lower leg: Edema present.   Skin:     General: Skin is warm and dry.      Comments: Chronic venous stasis changes B/L LE   Neurological:      General: No focal deficit present.      Mental Status: He is alert and oriented to person, place, and time.   Psychiatric:         Behavior: Behavior is cooperative.       Results Review:  I reviewed the patient's new clinical results.  I personally viewed and interpreted the patient's EKG/Telemetry data    Results from last 7 days   Lab Units 11/16/22  0158 11/15/22  0525 11/14/22  0609 11/13/22  1437   WBC 10*3/mm3 14.47* 15.14* 5.48 8.69   HEMOGLOBIN g/dL 12.0* 11.7* 12.8* 13.2   PLATELETS 10*3/mm3 215 200 203 205     Results from last 7 days   Lab Units 11/16/22  0158 11/15/22  0525 11/14/22  0609 11/13/22  1437   SODIUM mmol/L 142 139 140 142   POTASSIUM mmol/L 3.6 4.0 3.8 4.8   CHLORIDE mmol/L 103 104 104 108*   CO2 mmol/L 28.1 26.0 21.8* 21.5*   BUN mg/dL 35* 34* 24* 28*   CREATININE mg/dL 1.08 1.04 1.10 1.18   GLUCOSE mg/dL 126* 151* 233* 98   EGFR mL/min/1.73 67.7 70.8 66.2 60.8     Results from last 7 days   Lab Units 11/14/22 0609 11/13/22  1437   ALBUMIN g/dL 3.90 3.80   BILIRUBIN mg/dL 0.3 0.4   ALK PHOS U/L 75 86   AST (SGOT) U/L 18 27   ALT (SGPT) U/L 15 18     Results from last 7 days   Lab Units 11/16/22  0158 11/15/22  0525 11/14/22  0609 11/13/22  1437   CALCIUM mg/dL 8.7 8.9 9.4 9.0   ALBUMIN g/dL  --   --  3.90 3.80     Results from last 7 days   Lab Units 11/16/22  0158 11/15/22  2259 11/15/22  1912 11/15/22  1620 11/15/22  0525   PROCALCITONIN ng/mL  --   --   --   --  0.02   LACTATE mmol/L 2.1* 2.2* 3.6* 2.7*  --      Glucose   Date/Time Value Ref Range Status   11/15/2022 2122 124 70 - 130 mg/dL Final     Comment:     Meter: [id number] : [id number] Rachel MEDINA   11/15/2022 1624 125 70 - 130 mg/dL Final      Comment:     Meter: [id number] : [id number] Clemente Ortiz NA   11/15/2022 1133 113 70 - 130 mg/dL Final     Comment:     Meter: [id number] : [id number] Clemente Ortiz NA   11/15/2022 0531 128 70 - 130 mg/dL Final     Comment:     Meter: [id number] : [id number] Silvestre Juarez NA   11/14/2022 2103 164 (H) 70 - 130 mg/dL Final     Comment:     Meter: [id number] : [id number] Silvestre Juarez NA   11/14/2022 1619 165 (H) 70 - 130 mg/dL Final     Comment:     Meter: [id number] : [id number] Veronique Maria C NA   11/14/2022 1105 128 70 - 130 mg/dL Final     Comment:     Meter: [id number] : [id number] Veronique Laiyl NA       XR Chest 1 View    Result Date: 11/15/2022  Minimal likely atelectasis at the bases. Borderline heart size. Follow-up as clinical indications persist.  This report was finalized on 11/15/2022 4:01 PM by Dr. Tj Evans M.D.         Results for orders placed during the hospital encounter of 06/28/17    Adult Transthoracic Echo Complete    Interpretation Summary  · Left ventricular systolic function is normal. Calculated EF = 56.4%.  · Left ventricular diastolic dysfunction is noted (grade I) consistent with impaired relaxation  · Mild aortic valve regurgitation is present.  · Mild mitral valve regurgitation is present  · There is no evidence of pericardial effusion.        Scheduled Medications  azithromycin, 250 mg, Oral, Q24H  cefTRIAXone, 1 g, Intravenous, Q24H  enoxaparin, 40 mg, Subcutaneous, Q24H  FLUoxetine, 20 mg, Oral, Daily  furosemide, 40 mg, Oral, Daily  gabapentin, 300 mg, Oral, TID  insulin lispro, 0-7 Units, Subcutaneous, TID With Meals  ipratropium-albuterol, 3 mL, Nebulization, Q6H While Awake - RT  methylphenidate, 10 mg, Oral, Daily  Mirabegron ER, 25 mg, Oral, Daily  pantoprazole, 40 mg, Oral, BID  polyethylene glycol, 17 g, Oral, Daily  predniSONE, 40 mg, Oral, Daily With Breakfast  rOPINIRole, 0.5 mg, Oral, Nightly  sodium chloride, 10 mL,  Intravenous, Q12H  sucralfate, 1 g, Oral, 4x Daily AC & at Bedtime    Infusions   Diet  Diet Regular Texture (IDDSI 7); Regular Consistency; Regular/House Diet, Cardiac Diets; Healthy Heart (2-3 Na+)       Assessment/Plan     Active Hospital Problems    Diagnosis  POA   • **Acute exacerbation of chronic obstructive pulmonary disease (COPD) ([facility]) [J44.1]  Yes   • Leukocytosis [D72.829]  Yes   • Overweight (BMI 25.0-29.9) [E66.3]  Yes   • Hiatal hernia [K44.9]  Yes   • Rectal bleed [K62.5]  Yes   • Spinal stenosis, lumbar region with neurogenic claudication [M48.062]  Yes   • COPD exacerbation ([facility]) [J44.1]  Yes   • Peripheral neuropathy [G62.9]  Yes   • Chronic diastolic (congestive) heart failure ([facility]) [I50.32]  Yes   • Prostate cancer ([facility]) [C61]  Yes   • ADD (attention deficit disorder) [F98.8]  Yes   • Hemorrhoids [K64.9]  Yes      Resolved Hospital Problems   No resolved problems to display.       82 y.o. male admitted with Acute exacerbation of chronic obstructive pulmonary disease (COPD) ([facility]).    Chronic obstructive pulmonary disease with chronic respiratory failure (On 2L home O2) with acute exacerbation complicated by new Leukocytosis  - Etiology likely multifactorial in the setting of medication noncompliance and comorbid medical conditions.  - CXR on admission negative for evidence of focal consolidation and/or interstitial pulmonary opacities.  - On initial examination, patient did not have signs/symptoms to suggest concomitant pneumonia warranting antibiotic therapy, however on repeat examination 11/15, patient more uncomfortable with more labored respirations, coupled with increasing productive cough of yellow sputum and dyspnea, rising WBC count.  - Subsequently ordered repeat infectious workup and started Ceftriaxone and Azithromycin.  - Repeat workup relatively unremarkable, however, on CXR, there could possibly be some consolidation.  - Nonetheless, since initiating therapies as above, patient is  improving.  - Continue ceftriaxone and azithromycin (day 2, 11/16).  - Continue prednisone and inhalers as currently scheduled, Duonebs q6H scheduled + albuterol q4H PRN.  - Continue tessalon pearls, robitussin for symptom control.  - Order repeat CBC in AM for reassessment.  - Supplemental oxygen PRN to maintain O2 sat 88 to 92%.  - Continuous pulse oximetry and telemetry monitoring.  - Will need home oxygen evaluation and scheduled pulmonology follow up prior to discharge.    Chronic diastolic congestive heart failure  - Most recent 2D echocardiogram showing estimated EF 56.4% with grade 1 diastolic dysfunction.  - Patient felt to be hypervolemic on admission on physical exam on admission, likely from nonadherence to diuretics as outpatient.  Subsequently given IV Lasix leading to edema and good response.  - Examination secondary patient appears stable from a cardiac perspective overall, does have bilateral lower extremity edema, however, per patient it is improved from prior.  Respiratory status is stable, no evidence of bilateral interstitial opacities on initial chest x-ray to suggest pulmonary vascular congestion from heart failure exacerbation.  - Continue home furosemide as previously prescribed.  - Close monitoring of renal function and electrolytes,  2g sodium diet, daily weights, strict I/O, continuous pulse oximetry,telemetry monitoring, elevate HOB, supplemental O2 PRN for O2 sat <90%.    Normocytic Anemia  - Hemoglobin stable from prior. No evidence of overt blood loss. No indications for acute intervention at this time.    - Order repeat CBC in AM for reassessment. Continue to monitor, transfuse for hemoglobin <7.    GERD/Radiation proctitis/Sigmoid diverticulosis/Colonic angioectasias  - Diagnoses as above, noted on EGD and Colonoscopy from September 2022.  - Appears stable at this time, no evidence of GI bleeding, symptoms of GERD well controlled. Continue PPI and sucralfate as currently  prescribed.      · Lovenox 40 mg SC daily for DVT prophylaxis.  · Full code.  · Discussed with patient, nursing staff, CCP and care team on multidisciplinary rounds.  · Anticipate discharge TBD timing yet to be determined.      Scar Tirado DO  [facility]  11/16/22  07:53 EST

## 2022-11-17 LAB
ANION GAP SERPL CALCULATED.3IONS-SCNC: 8 MMOL/L (ref 5–15)
BASOPHILS # BLD AUTO: 0.01 10*3/MM3 (ref 0–0.2)
BASOPHILS NFR BLD AUTO: 0.1 % (ref 0–1.5)
BUN SERPL-MCNC: 29 MG/DL (ref 8–23)
BUN/CREAT SERPL: 34.1 (ref 7–25)
CALCIUM SPEC-SCNC: 8.6 MG/DL (ref 8.6–10.5)
CHLORIDE SERPL-SCNC: 103 MMOL/L (ref 98–107)
CO2 SERPL-SCNC: 29 MMOL/L (ref 22–29)
CREAT SERPL-MCNC: 0.85 MG/DL (ref 0.76–1.27)
DEPRECATED RDW RBC AUTO: 40.7 FL (ref 37–54)
EGFRCR SERPLBLD CKD-EPI 2021: 85.7 ML/MIN/1.73
EOSINOPHIL # BLD AUTO: 0 10*3/MM3 (ref 0–0.4)
EOSINOPHIL NFR BLD AUTO: 0 % (ref 0.3–6.2)
ERYTHROCYTE [DISTWIDTH] IN BLOOD BY AUTOMATED COUNT: 11.6 % (ref 12.3–15.4)
GLUCOSE BLDC GLUCOMTR-MCNC: 103 MG/DL (ref 70–130)
GLUCOSE BLDC GLUCOMTR-MCNC: 123 MG/DL (ref 70–130)
GLUCOSE BLDC GLUCOMTR-MCNC: 80 MG/DL (ref 70–130)
GLUCOSE BLDC GLUCOMTR-MCNC: 85 MG/DL (ref 70–130)
GLUCOSE SERPL-MCNC: 97 MG/DL (ref 65–99)
HCT VFR BLD AUTO: 37.2 % (ref 37.5–51)
HGB BLD-MCNC: 12.7 G/DL (ref 13–17.7)
IMM GRANULOCYTES # BLD AUTO: 0.06 10*3/MM3 (ref 0–0.05)
IMM GRANULOCYTES NFR BLD AUTO: 0.6 % (ref 0–0.5)
LYMPHOCYTES # BLD AUTO: 1.82 10*3/MM3 (ref 0.7–3.1)
LYMPHOCYTES NFR BLD AUTO: 18.4 % (ref 19.6–45.3)
MCH RBC QN AUTO: 32.3 PG (ref 26.6–33)
MCHC RBC AUTO-ENTMCNC: 34.1 G/DL (ref 31.5–35.7)
MCV RBC AUTO: 94.7 FL (ref 79–97)
MONOCYTES # BLD AUTO: 0.74 10*3/MM3 (ref 0.1–0.9)
MONOCYTES NFR BLD AUTO: 7.5 % (ref 5–12)
NEUTROPHILS NFR BLD AUTO: 7.25 10*3/MM3 (ref 1.7–7)
NEUTROPHILS NFR BLD AUTO: 73.4 % (ref 42.7–76)
NRBC BLD AUTO-RTO: 0 /100 WBC (ref 0–0.2)
PLATELET # BLD AUTO: 200 10*3/MM3 (ref 140–450)
PMV BLD AUTO: 9.8 FL (ref 6–12)
POTASSIUM SERPL-SCNC: 4 MMOL/L (ref 3.5–5.2)
RBC # BLD AUTO: 3.93 10*6/MM3 (ref 4.14–5.8)
SODIUM SERPL-SCNC: 140 MMOL/L (ref 136–145)
WBC NRBC COR # BLD: 9.88 10*3/MM3 (ref 3.4–10.8)

## 2022-11-17 PROCEDURE — 94799 UNLISTED PULMONARY SVC/PX: CPT

## 2022-11-17 PROCEDURE — 85025 COMPLETE CBC W/AUTO DIFF WBC: CPT | Performed by: STUDENT IN AN ORGANIZED HEALTH CARE EDUCATION/TRAINING PROGRAM

## 2022-11-17 PROCEDURE — 25010000002 CEFTRIAXONE PER 250 MG: Performed by: STUDENT IN AN ORGANIZED HEALTH CARE EDUCATION/TRAINING PROGRAM

## 2022-11-17 PROCEDURE — 82962 GLUCOSE BLOOD TEST: CPT

## 2022-11-17 PROCEDURE — 80048 BASIC METABOLIC PNL TOTAL CA: CPT | Performed by: STUDENT IN AN ORGANIZED HEALTH CARE EDUCATION/TRAINING PROGRAM

## 2022-11-17 PROCEDURE — 63710000001 PREDNISONE PER 1 MG: Performed by: STUDENT IN AN ORGANIZED HEALTH CARE EDUCATION/TRAINING PROGRAM

## 2022-11-17 PROCEDURE — 94761 N-INVAS EAR/PLS OXIMETRY MLT: CPT

## 2022-11-17 PROCEDURE — 97535 SELF CARE MNGMENT TRAINING: CPT

## 2022-11-17 PROCEDURE — 25010000002 ENOXAPARIN PER 10 MG: Performed by: STUDENT IN AN ORGANIZED HEALTH CARE EDUCATION/TRAINING PROGRAM

## 2022-11-17 PROCEDURE — 94664 DEMO&/EVAL PT USE INHALER: CPT

## 2022-11-17 RX ADMIN — METHYLPHENIDATE HYDROCHLORIDE 10 MG: 5 TABLET ORAL at 08:05

## 2022-11-17 RX ADMIN — FLUOXETINE HYDROCHLORIDE 20 MG: 20 CAPSULE ORAL at 08:06

## 2022-11-17 RX ADMIN — ROPINIROLE HYDROCHLORIDE 0.5 MG: 0.5 TABLET, FILM COATED ORAL at 20:36

## 2022-11-17 RX ADMIN — IPRATROPIUM BROMIDE AND ALBUTEROL SULFATE 3 ML: 2.5; .5 SOLUTION RESPIRATORY (INHALATION) at 20:49

## 2022-11-17 RX ADMIN — MIRABEGRON 25 MG: 25 TABLET, FILM COATED, EXTENDED RELEASE ORAL at 08:06

## 2022-11-17 RX ADMIN — AZITHROMYCIN DIHYDRATE 250 MG: 250 TABLET, FILM COATED ORAL at 18:36

## 2022-11-17 RX ADMIN — ACETAMINOPHEN 650 MG: 325 SUSPENSION ORAL at 12:25

## 2022-11-17 RX ADMIN — FUROSEMIDE 40 MG: 40 TABLET ORAL at 08:06

## 2022-11-17 RX ADMIN — GABAPENTIN 300 MG: 300 CAPSULE ORAL at 20:36

## 2022-11-17 RX ADMIN — GUAIFENESIN 200 MG: 100 SOLUTION ORAL at 12:25

## 2022-11-17 RX ADMIN — PANTOPRAZOLE SODIUM 40 MG: 40 TABLET, DELAYED RELEASE ORAL at 20:36

## 2022-11-17 RX ADMIN — IPRATROPIUM BROMIDE AND ALBUTEROL SULFATE 3 ML: 2.5; .5 SOLUTION RESPIRATORY (INHALATION) at 11:03

## 2022-11-17 RX ADMIN — SUCRALFATE 1 G: 1 TABLET ORAL at 18:34

## 2022-11-17 RX ADMIN — SUCRALFATE 1 G: 1 TABLET ORAL at 08:06

## 2022-11-17 RX ADMIN — Medication 10 ML: at 08:08

## 2022-11-17 RX ADMIN — SUCRALFATE 1 G: 1 TABLET ORAL at 12:25

## 2022-11-17 RX ADMIN — PREDNISONE 40 MG: 20 TABLET ORAL at 08:06

## 2022-11-17 RX ADMIN — ENOXAPARIN SODIUM 40 MG: 100 INJECTION SUBCUTANEOUS at 20:36

## 2022-11-17 RX ADMIN — Medication 10 ML: at 20:38

## 2022-11-17 RX ADMIN — BENZONATATE 200 MG: 100 CAPSULE ORAL at 08:05

## 2022-11-17 RX ADMIN — PANTOPRAZOLE SODIUM 40 MG: 40 TABLET, DELAYED RELEASE ORAL at 08:05

## 2022-11-17 RX ADMIN — CEFTRIAXONE SODIUM 1 G: 1 INJECTION, POWDER, FOR SOLUTION INTRAMUSCULAR; INTRAVENOUS at 18:34

## 2022-11-17 RX ADMIN — ACETAMINOPHEN 650 MG: 325 TABLET, FILM COATED ORAL at 08:06

## 2022-11-17 RX ADMIN — SUCRALFATE 1 G: 1 TABLET ORAL at 20:36

## 2022-11-17 RX ADMIN — GABAPENTIN 300 MG: 300 CAPSULE ORAL at 08:06

## 2022-11-17 RX ADMIN — GABAPENTIN 300 MG: 300 CAPSULE ORAL at 18:34

## 2022-11-17 RX ADMIN — GUAIFENESIN 200 MG: 100 SOLUTION ORAL at 08:05

## 2022-11-17 RX ADMIN — IPRATROPIUM BROMIDE AND ALBUTEROL SULFATE 3 ML: 2.5; .5 SOLUTION RESPIRATORY (INHALATION) at 06:55

## 2022-11-17 NOTE — PROGRESS NOTES
Name: Tony Leonard ADMIT: 2022   : 1940  PCP: Erich Aviles MD    MRN: [id number] LOS: 3 days   AGE/SEX: 82 y.o. male  ROOM: [facility]     Subjective   Subjective   Patient seen and examined this morning. Hospital day 5.  Discussed with nursing and care team on interdisciplinary rounds this morning, patient doing much better overall today when compared to prior, per discussion with patient, continues to endorse dyspnea and cough, however, still improving each day. Currently 2 L O2 via NC with O2 sat >90% (home oxygen requirements).      Review of Systems   Constitutional: Negative for chills and fever.   Respiratory: Positive for cough (improving) and shortness of breath (With exertion, improving).    Cardiovascular: Negative for chest pain and palpitations. Leg swelling: improved.   Gastrointestinal: Negative for abdominal distention and abdominal pain.   Genitourinary: Negative for dysuria and hematuria.   Musculoskeletal: Negative for arthralgias and myalgias.   Neurological: Negative for dizziness and light-headedness.        Objective   Objective   Vital Signs  Temp:  [97.2 °F (36.2 °C)-98.1 °F (36.7 °C)] 97.4 °F (36.3 °C)  Heart Rate:  [65-92] 92  Resp:  [18-20] 18  BP: (116-136)/(62-72) 136/66  SpO2:  [90 %-94 %] 93 %  on  Flow (L/min):  [2] 2;   Device (Oxygen Therapy): nasal cannula  Body mass index is 25.02 kg/m².  Physical Exam  Vitals and nursing note reviewed.   Constitutional:       General: He is awake. He is not in acute distress.     Comments: Elderly, frail appearing   HENT:      Head: Atraumatic.   Eyes:      General: No scleral icterus.     Conjunctiva/sclera: Conjunctivae normal.   Cardiovascular:      Rate and Rhythm: Normal rate and regular rhythm.      Pulses: Normal pulses.      Heart sounds: Normal heart sounds.   Pulmonary:      Effort: Pulmonary effort is normal. No respiratory distress.      Breath sounds: Decreased breath sounds present. No wheezing (resolved).       Comments: On 2L O2 via NC  Abdominal:      General: Bowel sounds are normal.      Palpations: Abdomen is soft.      Tenderness: There is no abdominal tenderness.   Musculoskeletal:      Right lower leg: Edema present.      Left lower leg: Edema present.   Skin:     General: Skin is warm and dry.      Comments: Chronic venous stasis changes B/L LE   Neurological:      General: No focal deficit present.      Mental Status: He is alert and oriented to person, place, and time.   Psychiatric:         Behavior: Behavior is cooperative.       Results Review:  I reviewed the patient's new clinical results.  I personally viewed and interpreted the patient's EKG/Telemetry data    Results from last 7 days   Lab Units 11/17/22  0604 11/16/22  0158 11/15/22  0525 11/14/22  0609   WBC 10*3/mm3 9.88 14.47* 15.14* 5.48   HEMOGLOBIN g/dL 12.7* 12.0* 11.7* 12.8*   PLATELETS 10*3/mm3 200 215 200 203     Results from last 7 days   Lab Units 11/17/22  0604 11/16/22  0158 11/15/22  0525 11/14/22  0609   SODIUM mmol/L 140 142 139 140   POTASSIUM mmol/L 4.0 3.6 4.0 3.8   CHLORIDE mmol/L 103 103 104 104   CO2 mmol/L 29.0 28.1 26.0 21.8*   BUN mg/dL 29* 35* 34* 24*   CREATININE mg/dL 0.85 1.08 1.04 1.10   GLUCOSE mg/dL 97 126* 151* 233*   EGFR mL/min/1.73 85.7 67.7 70.8 66.2     Results from last 7 days   Lab Units 11/14/22  0609 11/13/22  1437   ALBUMIN g/dL 3.90 3.80   BILIRUBIN mg/dL 0.3 0.4   ALK PHOS U/L 75 86   AST (SGOT) U/L 18 27   ALT (SGPT) U/L 15 18     Results from last 7 days   Lab Units 11/17/22  0604 11/16/22  0158 11/15/22  0525 11/14/22  0609 11/13/22  1437   CALCIUM mg/dL 8.6 8.7 8.9 9.4 9.0   ALBUMIN g/dL  --   --   --  3.90 3.80     Results from last 7 days   Lab Units 11/16/22  0813 11/16/22  0158 11/15/22  2259 11/15/22  1912 11/15/22  1620 11/15/22  0525   PROCALCITONIN ng/mL  --   --   --   --   --  0.02   LACTATE mmol/L 2.0 2.1* 2.2* 3.6*   < >  --     < > = values in this interval not displayed.     Glucose    Date/Time Value Ref Range Status   11/17/2022 1120 85 70 - 130 mg/dL Final     Comment:     Meter: [id number] : [id number] Veronique Lombardi NA   11/17/2022 0641 80 70 - 130 mg/dL Final     Comment:     Meter: [id number] : [id number] Rachel oBsch NA   11/16/2022 2138 109 70 - 130 mg/dL Final     Comment:     Meter: [id number] : [id number] Rachel Bosch NA   11/16/2022 1118 86 70 - 130 mg/dL Final     Comment:     Meter: [id number] : [id number] Timbo Cantu NA   11/16/2022 0622 90 70 - 130 mg/dL Final     Comment:     Meter: [id number] : [id number] Rachel Bosch NA   11/15/2022 2122 124 70 - 130 mg/dL Final     Comment:     Meter: [id number] : [id number] Rachel Bosch NA   11/15/2022 1624 125 70 - 130 mg/dL Final     Comment:     Meter: [id number] : [id number] Clemente MEDINA       XR Chest 1 View    Result Date: 11/15/2022  Minimal likely atelectasis at the bases. Borderline heart size. Follow-up as clinical indications persist.  This report was finalized on 11/15/2022 4:01 PM by Dr. Tj Evans M.D.         Results for orders placed during the hospital encounter of 06/28/17    Adult Transthoracic Echo Complete    Interpretation Summary  · Left ventricular systolic function is normal. Calculated EF = 56.4%.  · Left ventricular diastolic dysfunction is noted (grade I) consistent with impaired relaxation  · Mild aortic valve regurgitation is present.  · Mild mitral valve regurgitation is present  · There is no evidence of pericardial effusion.        Scheduled Medications  azithromycin, 250 mg, Oral, Q24H  cefTRIAXone, 1 g, Intravenous, Q24H  enoxaparin, 40 mg, Subcutaneous, Q24H  FLUoxetine, 20 mg, Oral, Daily  furosemide, 40 mg, Oral, Daily  gabapentin, 300 mg, Oral, TID  insulin lispro, 0-7 Units, Subcutaneous, TID With Meals  ipratropium-albuterol, 3 mL, Nebulization, Q6H While Awake - RT  methylphenidate, 10 mg, Oral, Daily  Mirabegron ER, 25 mg, Oral, Daily  pantoprazole, 40  mg, Oral, BID  polyethylene glycol, 17 g, Oral, Daily  predniSONE, 40 mg, Oral, Daily With Breakfast  rOPINIRole, 0.5 mg, Oral, Nightly  sodium chloride, 10 mL, Intravenous, Q12H  sucralfate, 1 g, Oral, 4x Daily AC & at Bedtime    Infusions   Diet  Diet Regular Texture (IDDSI 7); Regular Consistency; Regular/House Diet, Cardiac Diets; Healthy Heart (2-3 Na+)       Assessment/Plan     Active Hospital Problems    Diagnosis  POA   • **Acute exacerbation of chronic obstructive pulmonary disease (COPD) ([facility]) [J44.1]  Yes   • Leukocytosis [D72.829]  Yes   • Overweight (BMI 25.0-29.9) [E66.3]  Yes   • Hiatal hernia [K44.9]  Yes   • Rectal bleed [K62.5]  Yes   • Spinal stenosis, lumbar region with neurogenic claudication [M48.062]  Yes   • COPD exacerbation ([facility]) [J44.1]  Yes   • Peripheral neuropathy [G62.9]  Yes   • Chronic diastolic (congestive) heart failure ([facility]) [I50.32]  Yes   • Prostate cancer ([facility]) [C61]  Yes   • ADD (attention deficit disorder) [F98.8]  Yes   • Hemorrhoids [K64.9]  Yes      Resolved Hospital Problems   No resolved problems to display.       82 y.o. male admitted with Acute exacerbation of chronic obstructive pulmonary disease (COPD) ([facility]).    Chronic obstructive pulmonary disease with chronic respiratory failure (On 2L home O2) with acute exacerbation complicated by new Leukocytosis  - Etiology likely multifactorial in the setting of medication noncompliance and comorbid medical conditions.  - CXR on admission negative for evidence of focal consolidation and/or interstitial pulmonary opacities.  - On initial examination, patient did not have signs/symptoms to suggest concomitant pneumonia warranting antibiotic therapy, however on repeat examination 11/15, patient more uncomfortable with more labored respirations, coupled with increasing productive cough of yellow sputum and dyspnea, rising WBC count.  - Subsequently ordered repeat infectious workup and started Ceftriaxone and Azithromycin.  -  Repeat workup relatively unremarkable, however, on CXR, there could possibly be some consolidation.  - Nonetheless, since initiating therapies as above, patient is improving.  - Continue ceftriaxone and azithromycin (day 3, 11/17).  - Continue prednisone and inhalers as currently scheduled, Duonebs q6H scheduled + albuterol q4H PRN.  - Continue tessalon pearls, robitussin for symptom control.  - Order repeat CBC in AM for reassessment.  - Supplemental oxygen PRN to maintain O2 sat 88 to 92%.  - Continuous pulse oximetry and telemetry monitoring.  - Will need home oxygen evaluation and scheduled pulmonology follow up prior to discharge.    Chronic diastolic congestive heart failure  - Most recent 2D echocardiogram showing estimated EF 56.4% with grade 1 diastolic dysfunction.  - Patient felt to be hypervolemic on admission on physical exam on admission, likely from nonadherence to diuretics as outpatient.  Subsequently given IV Lasix leading to edema and good response.  - Examination secondary patient appears stable from a cardiac perspective overall, does have bilateral lower extremity edema, however, per patient it is decreased from prior.  Respiratory status is stable, no evidence of bilateral interstitial opacities on initial chest x-ray to suggest pulmonary vascular congestion from heart failure exacerbation.  - Continue home furosemide as previously prescribed.  - Close monitoring of renal function and electrolytes,  2g sodium diet, daily weights, strict I/O, continuous pulse oximetry,telemetry monitoring, elevate HOB, supplemental O2 PRN for O2 sat <90%.    Normocytic Anemia  - Hemoglobin stable from prior. No evidence of overt blood loss. No indications for acute intervention at this time.    - Order repeat CBC in AM for reassessment. Continue to monitor, transfuse for hemoglobin <7.    GERD/Radiation proctitis/Sigmoid diverticulosis/Colonic angioectasias  - Diagnoses as above, noted on EGD and Colonoscopy  from September 2022.  - Appears stable at this time, no evidence of GI bleeding, symptoms of GERD well controlled. Continue PPI and sucralfate as currently prescribed.      · Lovenox 40 mg SC daily for DVT prophylaxis.  · Full code.  · Discussed with patient, nursing staff, CCP and care team on multidisciplinary rounds.  · Anticipate discharge TBD timing yet to be determined.      Scar Tirado DO  [facility]  11/17/22  11:35 EST

## 2022-11-17 NOTE — THERAPY TREATMENT NOTE
Patient Name: Tony Leonard  : 1940    MRN: [id number]                              Today's Date: 2022       Admit Date: 2022    Visit Dx:     ICD-10-CM ICD-9-CM   1. Acute exacerbation of chronic obstructive pulmonary disease (COPD) ([facility])  J44.1 491.21   2. History of rectal bleeding  Z87.19 V12.79     Patient Active Problem List   Diagnosis   • Thrush, oral   • ADD (attention deficit disorder)   • Hemorrhoids   • Bronchiectasis with acute lower respiratory infection ([facility])   • Diverticul disease small and large intestine, no perforati or abscess   • Benign non-nodular prostatic hyperplasia without lower urinary tract symptoms   • Gastroesophageal reflux disease   • Malaise and fatigue   • Environmental allergies   • Hemoptysis   • Dyslipidemia   • Primary osteoarthritis involving multiple joints   • Pneumonia of right lower lobe due to infectious organism   • Abnormal EKG   • COPD (chronic obstructive pulmonary disease) ([facility])   • Mild malnutrition ([facility])   • Acute on chronic respiratory failure with hypoxia ([facility])   • Fracture, intertrochanteric, right femur, closed, initial encounter ([facility])   • Chronic diastolic CHF (congestive heart failure) ([facility])   • Hematoma of frontal scalp   • Postoperative anemia due to acute blood loss   • Urinary retention   • Hemorrhagic disorder due to circulating anticoagulants ([facility])   • History of fracture of right hip   • Closed fracture of right hip with routine healing   • Chronic low back pain with sciatica   • Change in bowel function   • Rectal bleeding   • Prostate cancer ([facility])   • On home oxygen therapy   • Acute viral bronchitis   • Chronic diastolic (congestive) heart failure ([facility])   • Peripheral neuropathy   • Plantar fasciitis   • COPD exacerbation ([facility])   • Bilateral lower extremity edema   • Microscopic hematuria   • Acute midline low back pain with right-sided sciatica   • Spinal stenosis, lumbar region with neurogenic claudication   • Compression  deformity of vertebra   • Rectal bleed   • Esophagitis   • Angiectasia   • Hiatal hernia   • Acute exacerbation of chronic obstructive pulmonary disease (COPD) ([facility])   • Overweight (BMI 25.0-29.9)   • Leukocytosis     Past Medical History:   Diagnosis Date   • ADHD (attention deficit hyperactivity disorder)    • Bronchiectasis ([facility])    • Colon polyp    • COPD (chronic obstructive pulmonary disease) ([facility])    • Diverticulosis    • Hard of hearing    • On home oxygen therapy     2 LITERS   • Pneumonia    • Prostate cancer ([facility]) 04/22/2019   • Spinal stenosis, lumbar region with neurogenic claudication 08/02/2022     Past Surgical History:   Procedure Laterality Date   • BRONCHOSCOPY N/A 4/30/2016    Procedure: BRONCHOSCOPY with BAL of right lower lobe and left  lower lobe.  ;  Surgeon: Nando Diaz MD;  Location: [facility] ENDOSCOPY;  Service:    • BRONCHOSCOPY N/A 4/28/2017    Procedure: BRONCHOSCOPY;  Surgeon: Katharina Salter MD;  Location: [facility] ENDOSCOPY;  Service:    • BRONCHOSCOPY Bilateral 6/29/2017    Procedure: BRONCHOSCOPY WITH WASHINGS;  Surgeon: Katharina Salter MD;  Location: [facility] ENDOSCOPY;  Service:    • BRONCHOSCOPY N/A 1/22/2018    Procedure: BRONCHOSCOPY AT BEDSIDE with BAL;  Surgeon: Katharina Salter MD;  Location: [facility] ENDOSCOPY;  Service:    • BRONCHOSCOPY N/A 1/30/2018    Procedure: BRONCHOSCOPY with BAL and washing;  Surgeon: Shreyas Wild MD;  Location: [facility] ENDOSCOPY;  Service:    • BRONCHOSCOPY Bilateral 4/24/2018    Procedure: BRONCHOSCOPY WITH WASHING;  Surgeon: Katharina Salter MD;  Location: [facility] ENDOSCOPY;  Service: Pulmonary   • BRONCHOSCOPY N/A 12/28/2019    Procedure: BRONCHOSCOPY with bilateral washing;  Surgeon: Katharina Salter MD;  Location: [facility] ENDOSCOPY;  Service: Pulmonary   • COLONOSCOPY  05/17/2013    eh, ih, tort, sig tics   • COLONOSCOPY N/A 5/10/2019    Non-thrombosed external hemorrhoids found on perianal exam, Diverticulosis, Tortuous colon, One 5 mm polyp in the  mid ascending colon, IH. Path: Tubular adenoma.    • COLONOSCOPY N/A 9/24/2022    Procedure: COLONOSCOPY to cecum and TI with argon plasma coagulation.;  Surgeon: Bruce Black MD;  Location: [facility] ENDOSCOPY;  Service: Gastroenterology;  Laterality: N/A;  pre- GI bleeding  post- radiation proctitis, diverticulosis   • ENDOSCOPY  03/19/2015    z line irreg, hh   • ENDOSCOPY N/A 9/24/2022    Procedure: ESOPHAGOGASTRODUODENOSCOPY;  Surgeon: Bruce Black MD;  Location: [facility] ENDOSCOPY;  Service: Gastroenterology;  Laterality: N/A;  pre- GI bleeding  post- esophagitis, hiatal hernia   • HIP OPEN REDUCTION Right 1/17/2018    Procedure: HIP OPEN REDUCTION INTERNAL FIXATION WITH DYNAMIC HIP SCREW;  Surgeon: Les Black MD;  Location: [facility] MAIN OR;  Service:    • SPINE SURGERY     • TONSILLECTOMY     • TOTAL HIP ARTHROPLASTY REVISION Right 9/23/2019    Procedure: HIP  REVISION RIGHT;  Surgeon: Isauro Warner MD;  Location: [facility] OR;  Service: Orthopedics      General Information     Row Name 11/17/22 1102          OT Time and Intention    Document Type therapy note (daily note)  -CE     Mode of Treatment occupational therapy;individual therapy  -CE     Row Name 11/17/22 1102          General Information    Existing Precautions/Restrictions fall  -CE     Row Name 11/17/22 1102          Cognition    Orientation Status (Cognition) oriented x 4  -CE     Row Name 11/17/22 1102          Safety Issues, Functional Mobility    Safety Issues Affecting Function (Mobility) insight into deficits/self-awareness;awareness of need for assistance  -CE     Impairments Affecting Function (Mobility) strength;pain;endurance/activity tolerance;balance  -CE           User Key  (r) = Recorded By, (t) = Taken By, (c) = Cosigned By    Initials Name Provider Type    CE Laura Maddox OT Occupational Therapist                 Mobility/ADL's     Row Name 11/17/22 1104          Transfers    Transfers sit-stand  transfer;stand-sit transfer  -CE     Row Name 11/17/22 1104          Sit-Stand Transfer    Sit-Stand [place] (Transfers) standby assist;verbal cues  -CE     Assistive Device (Sit-Stand Transfers) walker, front-wheeled  -CE     Comment, (Sit-Stand Transfer) cues for body mechanics and positioning in prep for sit to stand  -CE     Row Name 11/17/22 1104          Stand-Sit Transfer    Stand-Sit [place] (Transfers) standby assist;verbal cues  -CE     Row Name 11/17/22 1104          Functional Mobility    Functional Mobility- Comment Pt requesting to ambulate in room using his 3-wheeled walker (as he uses at home). Able to complete with CGA for safety with use of gait belt. Min cues for positioning of AD at sink.  -CE     Row Name 11/17/22 1104          Activities of Daily Living    BADL Assessment/Intervention lower body dressing;grooming  -CE     Row Name 11/17/22 1104          Lower Body Dressing Assessment/Training    [place] Level (Lower Body Dressing) pants/bottoms;don;contact guard assist  -CE     Position (Lower Body Dressing) supported sitting  in recliner  -CE     Comment, (Lower Body Dressing) increased time; per pt it takes him a while 2/2 stiff joints in a.m.  -CE     Row Name 11/17/22 1104          Grooming Assessment/Training    [place] Level (Grooming) hair care, combing/brushing;oral care regimen;contact guard assist  -CE     Position (Grooming) sink side  standing with 3-wheel walker without seated rest  -CE           User Key  (r) = Recorded By, (t) = Taken By, (c) = Cosigned By    Initials Name Provider Type    Laura Swartz OT Occupational Therapist               Obj/Interventions     Row Name 11/17/22 1109          Balance    Static Sitting Balance standby assist  -CE     Static Standing Balance contact guard  -CE     Dynamic Standing Balance contact guard  -CE     Comment, Balance Pt able to ambulate from chair to saavedra and back without LOB with use of 3-wheel walker and  "CGA for safety.  -CE           User Key  (r) = Recorded By, (t) = Taken By, (c) = Cosigned By    Initials Name Provider Type    Laura Swartz OT Occupational Therapist               Goals/Plan    No documentation.                Clinical Impression     Row Name 11/17/22 1111          Pain Assessment    Pretreatment Pain Rating 0/10 - no pain  -CE     Posttreatment Pain Rating 0/10 - no pain  -CE     Row Name 11/17/22 1111          Plan of Care Review    Outcome Evaluation Pt seen for OT treatment this date. Pt reporting he thinks \"I've worked most of the stiffness out this morning. Lets go for a walk.\" Pt able to complete LBD alternating sit<>stand with CGA and grooming standing at sink >5 min with CGA for safety. Pt with no c/o SOB or RODRIGUEZ during mobility or ADLs. Educated pt on home safety and med mgt (per chart pt non-compliant); pt endorsing he does occasionally forget his meds. Pt would benefit from HHOT upon discharge. Will con't to follow for stated goals.  -CE           User Key  (r) = Recorded By, (t) = Taken By, (c) = Cosigned By    Initials Name Provider Type    Laura Swartz OT Occupational Therapist               Outcome Measures     Row Name 11/17/22 1114          How much help from another is currently needed...    Putting on and taking off regular lower body clothing? 3  -CE     Bathing (including washing, rinsing, and drying) 3  -CE     Toileting (which includes using toilet bed pan or urinal) 3  -CE     Putting on and taking off regular upper body clothing 3  -CE     Taking care of personal grooming (such as brushing teeth) 3  -CE     Eating meals 3  -CE     AM-PAC 6 Clicks Score (OT) 18  -CE     Row Name 11/17/22 1114          Functional Assessment    Outcome Measure Options AM-PAC 6 Clicks Daily Activity (OT)  -CE           User Key  (r) = Recorded By, (t) = Taken By, (c) = Cosigned By    Initials Name Provider Type    Laura Swartz OT Occupational Therapist          " "      Occupational Therapy Education     Title: PT OT SLP Therapies (In Progress)     Topic: Occupational Therapy (In Progress)     Point: ADL training (Done)     Description:   Instruct learner(s) on proper safety adaptation and remediation techniques during self care or transfers.   Instruct in proper use of assistive devices.              Learning Progress Summary           Patient Acceptance, E, VU by  at 11/15/2022 1320    Comment: OT goals, POC, mobility recommendations with pt- encouraged up to chair X3 per day                   Point: Home exercise program (Not Started)     Description:   Instruct learner(s) on appropriate technique for monitoring, assisting and/or progressing therapeutic exercises/activities.              Learner Progress:  Not documented in this visit.          Point: Precautions (Not Started)     Description:   Instruct learner(s) on prescribed precautions during self-care and functional transfers.              Learner Progress:  Not documented in this visit.          Point: Body mechanics (Not Started)     Description:   Instruct learner(s) on proper positioning and spine alignment during self-care, functional mobility activities and/or exercises.              Learner Progress:  Not documented in this visit.                      User Key     Initials Effective Dates Name Provider Type Discipline     04/02/20 -  Madelyn Em, OT Occupational Therapist OT              OT Recommendation and Plan     Plan of Care Review  Outcome Evaluation: Pt seen for OT treatment this date. Pt reporting he thinks \"I've worked most of the stiffness out this morning. Lets go for a walk.\" Pt able to complete LBD alternating sit<>stand with CGA and grooming standing at sink >5 min with CGA for safety. Pt with no c/o SOB or RODRIGUEZ during mobility or ADLs. Educated pt on home safety and med mgt (per chart pt non-compliant); pt endorsing he does occasionally forget his meds. Pt would benefit from HHOT upon " discharge. Will con't to follow for stated goals.     Time Calculation:    Time Calculation- OT     Row Name 11/17/22 1115             Time Calculation- OT    OT Start Time 0933  -CE      OT Stop Time 0956  -CE      OT Time Calculation (min) 23 min  -CE      Total Timed Code Minutes- OT 23 minute(s)  -CE      OT Received On 11/17/22  -CE      OT - Next Appointment 11/18/22  -CE         Timed Charges    45112 - OT Self Care/Mgmt Minutes 23  -CE         Total Minutes    Timed Charges Total Minutes 23  -CE       Total Minutes 23  -CE            User Key  (r) = Recorded By, (t) = Taken By, (c) = Cosigned By    Initials Name Provider Type    CE Laura Maddox, OT Occupational Therapist              Therapy Charges for Today     Code Description Service Date Service Provider Modifiers Qty    [id number] HC OT SELF CARE/MGMT/TRAIN EA 15 MIN 11/17/2022 Laura Maddox OT GO 2               Laura Maddox OT  11/17/2022

## 2022-11-17 NOTE — PLAN OF CARE
"Goal Outcome Evaluation:              Outcome Evaluation: Pt seen for OT treatment this date. Pt reporting he thinks \"I've worked most of the stiffness out this morning. Lets go for a walk.\" Pt able to complete LBD alternating sit<>stand with CGA and grooming standing at sink >5 min with CGA for safety. Pt with no c/o SOB or RODRIGUEZ during mobility or ADLs. Educated pt on home safety and med mgt (per chart pt non-compliant); pt endorsing he does occasionally forget his meds. Pt would benefit from HHOT upon discharge. Will con't to follow for stated goals.  "

## 2022-11-17 NOTE — PLAN OF CARE
No acute events overnight. Patient A&O x4, VSS on 2LNC. Patient denies chest pain, N/V, and palpitations. No falls. Call light within reach.      Problem: Adult Inpatient Plan of Care  Goal: Plan of Care Review  Outcome: Ongoing, Progressing  Flowsheets  Taken 11/17/2022 0506 by Nemo Dennis RN  Progress: no change  Taken 11/15/2022 1906 by Jeaneth Montalvo RN  Plan of Care Reviewed With: patient  Goal: Patient-Specific Goal (Individualized)  Outcome: Ongoing, Progressing  Goal: Absence of Hospital-Acquired Illness or Injury  Outcome: Ongoing, Progressing  Intervention: Identify and Manage Fall Risk  Description: Perform standard risk assessment on admission using a validated tool or comprehensive approach appropriate to the patient; reassess fall risk frequently, with change in status or transfer to another level of care.  Communicate fall injury risk to interprofessional healthcare team.  Determine need for increased observation, equipment and environmental modification, such as low bed, signage and supportive, nonskid footwear.  Adjust safety measures to individual developmental age, stage and identified risk factors.  Reinforce the importance of safety and physical activity with patient and family.  Perform regular intentional rounding to assess need for position change, pain assessment and personal needs, including assistance with toileting.  Recent Flowsheet Documentation  Taken 11/17/2022 0432 by Nemo Dennis RN  Safety Promotion/Fall Prevention: safety round/check completed  Taken 11/17/2022 0220 by Nemo Dennis RN  Safety Promotion/Fall Prevention: safety round/check completed  Taken 11/17/2022 0012 by Nemo Dennis RN  Safety Promotion/Fall Prevention:   safety round/check completed   activity supervised   assistive device/personal items within reach   clutter free environment maintained   fall prevention program maintained   gait belt   lighting adjusted   nonskid shoes/slippers when  out of bed   room organization consistent  Taken 11/16/2022 2210 by Nemo Dennis RN  Safety Promotion/Fall Prevention: safety round/check completed  Taken 11/16/2022 2015 by Nemo Dennis RN  Safety Promotion/Fall Prevention:   safety round/check completed   activity supervised   assistive device/personal items within reach   clutter free environment maintained   fall prevention program maintained   gait belt   lighting adjusted   nonskid shoes/slippers when out of bed   room organization consistent  Intervention: Prevent Skin Injury  Description: Perform a screening for skin injury risk, such as pressure or moisture associated skin damage on admission and at regular intervals throughout hospital stay.  Keep all areas of skin (especially folds) clean and dry.  Maintain adequate skin hydration.  Relieve and redistribute pressure and protect bony prominences; implement measures based on patient-specific risk factors.  Match turning and repositioning schedule to clinical condition.  Encourage weight shift frequently; assist with reposition if unable to complete independently.  Float heels off bed; avoid pressure on the Achilles tendon.  Keep skin free from extended contact with medical devices.  Encourage functional activity and mobility, as early as tolerated.  Use aids (e.g., slide boards, mechanical lift) during transfer.  Recent Flowsheet Documentation  Taken 11/17/2022 0432 by Nemo Dennis RN  Body Position: position changed independently  Taken 11/17/2022 0220 by Nemo Dennis RN  Body Position: position changed independently  Taken 11/17/2022 0012 by Nemo Dennis RN  Body Position: position changed independently  Skin Protection:   adhesive use limited   tubing/devices free from skin contact  Taken 11/16/2022 2210 by Nemo Dennis RN  Body Position: position changed independently  Taken 11/16/2022 2015 by Nemo Dennis RN  Body Position: position changed independently  Skin  Protection:   adhesive use limited   tubing/devices free from skin contact  Intervention: Prevent and Manage VTE (Venous Thromboembolism) Risk  Description: Assess for VTE (venous thromboembolism) risk.  Encourage and assist with early ambulation.  Initiate and maintain compression or other therapy, as indicated, based on identified risk in accordance with organizational protocol and provider order.  Encourage both active and passive leg exercises while in bed, if unable to ambulate.  Recent Flowsheet Documentation  Taken 11/17/2022 0432 by Nemo Dennis RN  Activity Management: activity adjusted per tolerance  Taken 11/17/2022 0220 by Nemo Dennis RN  Activity Management: activity adjusted per tolerance  Taken 11/17/2022 0012 by Nemo Dennis RN  Activity Management: activity adjusted per tolerance  VTE Prevention/Management: (Lovenox)   sequential compression devices off   patient refused intervention   other (see comments)  Range of Motion: active ROM (range of motion) encouraged  Taken 11/16/2022 2210 by Nemo Dennis RN  Activity Management: activity adjusted per tolerance  Taken 11/16/2022 2015 by Nemo Dennis RN  Activity Management: activity adjusted per tolerance  VTE Prevention/Management: (Lovenox)   sequential compression devices off   patient refused intervention   other (see comments)  Range of Motion: active ROM (range of motion) encouraged  Intervention: Prevent Infection  Description: Maintain skin and mucous membrane integrity; promote hand, oral and pulmonary hygiene.  Optimize fluid balance, nutrition, sleep and glycemic control to maximize infection resistance.  Identify potential sources of infection early to prevent or mitigate progression of infection (e.g., wound, lines, devices).  Evaluate ongoing need for invasive devices; remove promptly when no longer indicated.  Recent Flowsheet Documentation  Taken 11/17/2022 0432 by Nemo Dennis RN  Infection Prevention:    hand hygiene promoted   rest/sleep promoted   single patient room provided   visitors restricted/screened  Taken 11/17/2022 0220 by Nemo Dennis RN  Infection Prevention:   hand hygiene promoted   rest/sleep promoted   single patient room provided   visitors restricted/screened  Taken 11/17/2022 0012 by Nemo Dennis RN  Infection Prevention:   hand hygiene promoted   rest/sleep promoted   single patient room provided   visitors restricted/screened  Taken 11/16/2022 2210 by Nemo Dennis RN  Infection Prevention:   hand hygiene promoted   rest/sleep promoted   single patient room provided   visitors restricted/screened  Taken 11/16/2022 2015 by Nemo Dennis RN  Infection Prevention:   hand hygiene promoted   single patient room provided   visitors restricted/screened   rest/sleep promoted  Goal: Optimal Comfort and Wellbeing  Outcome: Ongoing, Progressing  Intervention: Provide Person-Centered Care  Description: Use a family-focused approach to care.  Develop trust and rapport by proactively providing information, encouraging questions, addressing concerns and offering reassurance.  Acknowledge emotional response to hospitalization.  Recognize and utilize personal coping strategies.  Honor spiritual and cultural preferences.  Recent Flowsheet Documentation  Taken 11/17/2022 0012 by Nemo Dennis RN  Trust Relationship/Rapport:   care explained   choices provided   thoughts/feelings acknowledged  Taken 11/16/2022 2015 by Nemo Dennis RN  Trust Relationship/Rapport:   care explained   choices provided   emotional support provided   empathic listening provided   questions encouraged   questions answered   reassurance provided   thoughts/feelings acknowledged  Goal: Readiness for Transition of Care  Outcome: Ongoing, Progressing     Problem: Fall Injury Risk  Goal: Absence of Fall and Fall-Related Injury  Outcome: Ongoing, Progressing  Intervention: Identify and Manage  Contributors  Description: Develop a fall prevention plan with the patient and caregiver/family.  Provide reorientation, appropriate sensory stimulation and routines with changes in mental status to decrease risk of fall.  Promote use of personal vision and auditory aids.  Assess assistance level required for safe and effective self-care; provide support as needed, such as toileting, mobilization. For age 65 and older, implement timed toileting with assistance.  Encourage physical activity, such as performance of mobility and self-care at highest level of patient ability, multicomponent exercise program and provision of appropriate assistive devices.  If fall occurs, assess the severity of injury; implement fall injury protocol. Determine the cause and revise fall injury prevention plan.  Regularly review medication contribution to fall risk; adjust medication administration times to minimize risk of falling.  Consider risk related to polypharmacy and age.  Balance adequate pain management with potential for oversedation.  Recent Flowsheet Documentation  Taken 11/17/2022 0432 by Nemo Dennis RN  Medication Review/Management: medications reviewed  Taken 11/17/2022 0220 by Nemo Dennis RN  Medication Review/Management: medications reviewed  Taken 11/17/2022 0012 by Nemo Dennis RN  Medication Review/Management: medications reviewed  Self-Care Promotion: independence encouraged  Taken 11/16/2022 2210 by Nemo Dennis RN  Medication Review/Management: medications reviewed  Taken 11/16/2022 2015 by Nemo Dennis RN  Medication Review/Management: medications reviewed  Self-Care Promotion: independence encouraged  Intervention: Promote Injury-Free Environment  Description: Provide a safe, barrier-free environment that encourages independent activity.  Keep care area uncluttered and well-lighted.  Determine need for increased observation or monitoring.  Avoid use of devices that minimize mobility,  such as restraints or indwelling urinary catheter.  Recent Flowsheet Documentation  Taken 11/17/2022 0432 by Nemo Dennis RN  Safety Promotion/Fall Prevention: safety round/check completed  Taken 11/17/2022 0220 by Nemo Dennis RN  Safety Promotion/Fall Prevention: safety round/check completed  Taken 11/17/2022 0012 by Nemo Dennis RN  Safety Promotion/Fall Prevention:   safety round/check completed   activity supervised   assistive device/personal items within reach   clutter free environment maintained   fall prevention program maintained   gait belt   lighting adjusted   nonskid shoes/slippers when out of bed   room organization consistent  Taken 11/16/2022 2210 by Nemo Dennis RN  Safety Promotion/Fall Prevention: safety round/check completed  Taken 11/16/2022 2015 by Nemo Dennis RN  Safety Promotion/Fall Prevention:   safety round/check completed   activity supervised   assistive device/personal items within reach   clutter free environment maintained   fall prevention program maintained   gait belt   lighting adjusted   nonskid shoes/slippers when out of bed   room organization consistent

## 2022-11-18 ENCOUNTER — TELEPHONE (OUTPATIENT)
Dept: ORTHOPEDICS | Facility: OTHER | Age: 82
End: 2022-11-18

## 2022-11-18 LAB
ANION GAP SERPL CALCULATED.3IONS-SCNC: 9.8 MMOL/L (ref 5–15)
BASOPHILS # BLD AUTO: 0.01 10*3/MM3 (ref 0–0.2)
BASOPHILS NFR BLD AUTO: 0.1 % (ref 0–1.5)
BUN SERPL-MCNC: 32 MG/DL (ref 8–23)
BUN/CREAT SERPL: 34.4 (ref 7–25)
CALCIUM SPEC-SCNC: 9.1 MG/DL (ref 8.6–10.5)
CHLORIDE SERPL-SCNC: 103 MMOL/L (ref 98–107)
CO2 SERPL-SCNC: 28.2 MMOL/L (ref 22–29)
CREAT SERPL-MCNC: 0.93 MG/DL (ref 0.76–1.27)
DEPRECATED RDW RBC AUTO: 43.4 FL (ref 37–54)
EGFRCR SERPLBLD CKD-EPI 2021: 81 ML/MIN/1.73
EOSINOPHIL # BLD AUTO: 0.01 10*3/MM3 (ref 0–0.4)
EOSINOPHIL NFR BLD AUTO: 0.1 % (ref 0.3–6.2)
ERYTHROCYTE [DISTWIDTH] IN BLOOD BY AUTOMATED COUNT: 11.9 % (ref 12.3–15.4)
GLUCOSE BLDC GLUCOMTR-MCNC: 109 MG/DL (ref 70–130)
GLUCOSE BLDC GLUCOMTR-MCNC: 110 MG/DL (ref 70–130)
GLUCOSE BLDC GLUCOMTR-MCNC: 113 MG/DL (ref 70–130)
GLUCOSE BLDC GLUCOMTR-MCNC: 85 MG/DL (ref 70–130)
GLUCOSE SERPL-MCNC: 94 MG/DL (ref 65–99)
HCT VFR BLD AUTO: 40.5 % (ref 37.5–51)
HGB BLD-MCNC: 13.1 G/DL (ref 13–17.7)
IMM GRANULOCYTES # BLD AUTO: 0.06 10*3/MM3 (ref 0–0.05)
IMM GRANULOCYTES NFR BLD AUTO: 0.7 % (ref 0–0.5)
LYMPHOCYTES # BLD AUTO: 2.3 10*3/MM3 (ref 0.7–3.1)
LYMPHOCYTES NFR BLD AUTO: 27.4 % (ref 19.6–45.3)
MCH RBC QN AUTO: 31.7 PG (ref 26.6–33)
MCHC RBC AUTO-ENTMCNC: 32.3 G/DL (ref 31.5–35.7)
MCV RBC AUTO: 98.1 FL (ref 79–97)
MONOCYTES # BLD AUTO: 0.7 10*3/MM3 (ref 0.1–0.9)
MONOCYTES NFR BLD AUTO: 8.3 % (ref 5–12)
NEUTROPHILS NFR BLD AUTO: 5.31 10*3/MM3 (ref 1.7–7)
NEUTROPHILS NFR BLD AUTO: 63.4 % (ref 42.7–76)
NRBC BLD AUTO-RTO: 0 /100 WBC (ref 0–0.2)
PLATELET # BLD AUTO: 224 10*3/MM3 (ref 140–450)
PMV BLD AUTO: 9.9 FL (ref 6–12)
POTASSIUM SERPL-SCNC: 4.1 MMOL/L (ref 3.5–5.2)
RBC # BLD AUTO: 4.13 10*6/MM3 (ref 4.14–5.8)
SODIUM SERPL-SCNC: 141 MMOL/L (ref 136–145)
WBC NRBC COR # BLD: 8.39 10*3/MM3 (ref 3.4–10.8)

## 2022-11-18 PROCEDURE — 85025 COMPLETE CBC W/AUTO DIFF WBC: CPT | Performed by: STUDENT IN AN ORGANIZED HEALTH CARE EDUCATION/TRAINING PROGRAM

## 2022-11-18 PROCEDURE — 63710000001 PREDNISONE PER 1 MG: Performed by: STUDENT IN AN ORGANIZED HEALTH CARE EDUCATION/TRAINING PROGRAM

## 2022-11-18 PROCEDURE — 25010000002 ENOXAPARIN PER 10 MG: Performed by: STUDENT IN AN ORGANIZED HEALTH CARE EDUCATION/TRAINING PROGRAM

## 2022-11-18 PROCEDURE — 80048 BASIC METABOLIC PNL TOTAL CA: CPT | Performed by: STUDENT IN AN ORGANIZED HEALTH CARE EDUCATION/TRAINING PROGRAM

## 2022-11-18 PROCEDURE — 94799 UNLISTED PULMONARY SVC/PX: CPT

## 2022-11-18 PROCEDURE — 82962 GLUCOSE BLOOD TEST: CPT

## 2022-11-18 PROCEDURE — 94760 N-INVAS EAR/PLS OXIMETRY 1: CPT

## 2022-11-18 PROCEDURE — 25010000002 CEFTRIAXONE PER 250 MG: Performed by: STUDENT IN AN ORGANIZED HEALTH CARE EDUCATION/TRAINING PROGRAM

## 2022-11-18 PROCEDURE — 94664 DEMO&/EVAL PT USE INHALER: CPT

## 2022-11-18 PROCEDURE — 94761 N-INVAS EAR/PLS OXIMETRY MLT: CPT

## 2022-11-18 RX ADMIN — FLUOXETINE HYDROCHLORIDE 20 MG: 20 CAPSULE ORAL at 08:46

## 2022-11-18 RX ADMIN — MIRABEGRON 25 MG: 25 TABLET, FILM COATED, EXTENDED RELEASE ORAL at 08:45

## 2022-11-18 RX ADMIN — IPRATROPIUM BROMIDE AND ALBUTEROL SULFATE 3 ML: 2.5; .5 SOLUTION RESPIRATORY (INHALATION) at 06:39

## 2022-11-18 RX ADMIN — METHYLPHENIDATE HYDROCHLORIDE 10 MG: 5 TABLET ORAL at 13:55

## 2022-11-18 RX ADMIN — FUROSEMIDE 40 MG: 40 TABLET ORAL at 08:46

## 2022-11-18 RX ADMIN — IPRATROPIUM BROMIDE AND ALBUTEROL SULFATE 3 ML: 2.5; .5 SOLUTION RESPIRATORY (INHALATION) at 11:56

## 2022-11-18 RX ADMIN — GABAPENTIN 300 MG: 300 CAPSULE ORAL at 15:09

## 2022-11-18 RX ADMIN — IPRATROPIUM BROMIDE AND ALBUTEROL SULFATE 3 ML: 2.5; .5 SOLUTION RESPIRATORY (INHALATION) at 20:43

## 2022-11-18 RX ADMIN — PANTOPRAZOLE SODIUM 40 MG: 40 TABLET, DELAYED RELEASE ORAL at 21:28

## 2022-11-18 RX ADMIN — Medication 10 ML: at 08:46

## 2022-11-18 RX ADMIN — SUCRALFATE 1 G: 1 TABLET ORAL at 08:46

## 2022-11-18 RX ADMIN — CEFTRIAXONE SODIUM 1 G: 1 INJECTION, POWDER, FOR SOLUTION INTRAMUSCULAR; INTRAVENOUS at 15:09

## 2022-11-18 RX ADMIN — Medication 10 ML: at 21:29

## 2022-11-18 RX ADMIN — PANTOPRAZOLE SODIUM 40 MG: 40 TABLET, DELAYED RELEASE ORAL at 08:46

## 2022-11-18 RX ADMIN — AZITHROMYCIN DIHYDRATE 250 MG: 250 TABLET, FILM COATED ORAL at 17:49

## 2022-11-18 RX ADMIN — GABAPENTIN 300 MG: 300 CAPSULE ORAL at 08:46

## 2022-11-18 RX ADMIN — SUCRALFATE 1 G: 1 TABLET ORAL at 17:49

## 2022-11-18 RX ADMIN — ROPINIROLE HYDROCHLORIDE 0.5 MG: 0.5 TABLET, FILM COATED ORAL at 21:29

## 2022-11-18 RX ADMIN — SUCRALFATE 1 G: 1 TABLET ORAL at 21:29

## 2022-11-18 RX ADMIN — GABAPENTIN 300 MG: 300 CAPSULE ORAL at 21:28

## 2022-11-18 RX ADMIN — SUCRALFATE 1 G: 1 TABLET ORAL at 12:15

## 2022-11-18 RX ADMIN — PREDNISONE 40 MG: 20 TABLET ORAL at 08:46

## 2022-11-18 RX ADMIN — ENOXAPARIN SODIUM 40 MG: 100 INJECTION SUBCUTANEOUS at 21:35

## 2022-11-18 RX ADMIN — POLYETHYLENE GLYCOL 3350 17 G: 17 POWDER, FOR SOLUTION ORAL at 08:45

## 2022-11-18 NOTE — CASE MANAGEMENT/SOCIAL WORK
Continued Stay Note  [facility]     Patient Name: Tony Leonard  MRN: [id number]  Today's Date: 11/18/2022    Admit Date: 11/13/2022    Plan: Home with [facility]following   Discharge Plan     Row Name 11/18/22 1633       Plan    Plan Home with [facility]following    Patient/Family in Agreement with Plan yes    Plan Comments Patient returning to [facility] at discharge. [facility] following. Has home 02 at 2L via [facility]. Family can transport. Serjio DC RN CCP               Discharge Codes    No documentation.               Expected Discharge Date and Time     Expected Discharge Date Expected Discharge Time    Nov 18, 2022             Serjio Painting RN

## 2022-11-18 NOTE — PLAN OF CARE
Goal Outcome Evaluation:  Plan of Care Reviewed With: patient           Outcome Evaluation: A&O x4, 2LNC, VSS, complaints of minor back and hip pain readjusted in the bed and more comfortable with pillows behind. Patient up to bathroom with walker slightly unsteady when walking bed alarm placed at bedtime. No questions or concerns at this time. Will continue to monitor.

## 2022-11-18 NOTE — TELEPHONE ENCOUNTER
Caller: RAMIRO CASEY    Relationship to patient: SELF    Best call back number: [phone]    Patient is needing: PATIENT IS WONDERING IF DR. DE SOUZA IS IN THE [facility].

## 2022-11-18 NOTE — PROGRESS NOTES
Name: Tony Leonard ADMIT: 2022   : 1940  PCP: Erich Aviles MD    MRN: [id number] LOS: 4 days   AGE/SEX: 82 y.o. male  ROOM: [facility]     Subjective   Subjective   Patient seen and examined this morning. Hospital day 5.  Discussed with nursing and care team on interdisciplinary rounds this morning.  Patient continues to improve each day.  At time of my examination this morning, patient is awake, alert, resting comfortably, dyspnea and cough both continue to improve. Remains on 2 L O2 via NC with O2 sat >90% (home oxygen requirements).      Review of Systems   Constitutional: Negative for chills and fever.   Respiratory: Positive for cough (improving) and shortness of breath (With exertion, improving).    Cardiovascular: Positive for leg swelling. Negative for chest pain and palpitations.   Gastrointestinal: Negative for abdominal distention, abdominal pain, constipation and diarrhea.   Genitourinary: Negative for dysuria and hematuria.   Musculoskeletal: Negative for arthralgias and myalgias.   Neurological: Negative for dizziness and light-headedness.        Objective   Objective   Vital Signs  Temp:  [97.8 °F (36.6 °C)-98.3 °F (36.8 °C)] 98.3 °F (36.8 °C)  Heart Rate:  [58-84] 77  Resp:  [16-18] 16  BP: (123-130)/(73-77) 130/73  SpO2:  [92 %-95 %] 95 %  on  Flow (L/min):  [2] 2;   Device (Oxygen Therapy): nasal cannula  Body mass index is 25.02 kg/m².  Physical Exam  Vitals and nursing note reviewed.   Constitutional:       General: He is awake. He is not in acute distress.     Comments: Elderly, frail appearing   HENT:      Head: Atraumatic.   Eyes:      General: No scleral icterus.     Conjunctiva/sclera: Conjunctivae normal.   Cardiovascular:      Rate and Rhythm: Normal rate and regular rhythm.      Pulses: Normal pulses.      Heart sounds: Normal heart sounds.   Pulmonary:      Effort: Pulmonary effort is normal. No respiratory distress.      Breath sounds: No decreased breath sounds  or wheezing (resolved).      Comments: On 2L O2 via NC  Abdominal:      General: Bowel sounds are normal.      Palpations: Abdomen is soft.      Tenderness: There is no abdominal tenderness.   Musculoskeletal:      Right lower leg: Edema present.      Left lower leg: Edema present.   Skin:     General: Skin is warm and dry.      Comments: Chronic venous stasis changes B/L LE   Neurological:      General: No focal deficit present.      Mental Status: He is alert and oriented to person, place, and time.   Psychiatric:         Behavior: Behavior is cooperative.       Results Review:  I reviewed the patient's new clinical results.  I personally viewed and interpreted the patient's EKG/Telemetry data    Results from last 7 days   Lab Units 11/18/22  0545 11/17/22  0604 11/16/22 0158 11/15/22  0525   WBC 10*3/mm3 8.39 9.88 14.47* 15.14*   HEMOGLOBIN g/dL 13.1 12.7* 12.0* 11.7*   PLATELETS 10*3/mm3 224 200 215 200     Results from last 7 days   Lab Units 11/18/22  0545 11/17/22  0604 11/16/22  0158 11/15/22  0525   SODIUM mmol/L 141 140 142 139   POTASSIUM mmol/L 4.1 4.0 3.6 4.0   CHLORIDE mmol/L 103 103 103 104   CO2 mmol/L 28.2 29.0 28.1 26.0   BUN mg/dL 32* 29* 35* 34*   CREATININE mg/dL 0.93 0.85 1.08 1.04   GLUCOSE mg/dL 94 97 126* 151*   EGFR mL/min/1.73 81.0 85.7 67.7 70.8     Results from last 7 days   Lab Units 11/14/22  0609 11/13/22  1437   ALBUMIN g/dL 3.90 3.80   BILIRUBIN mg/dL 0.3 0.4   ALK PHOS U/L 75 86   AST (SGOT) U/L 18 27   ALT (SGPT) U/L 15 18     Results from last 7 days   Lab Units 11/18/22  0545 11/17/22  0604 11/16/22  0158 11/15/22  0525 11/14/22  0609 11/13/22  1437   CALCIUM mg/dL 9.1 8.6 8.7 8.9 9.4 9.0   ALBUMIN g/dL  --   --   --   --  3.90 3.80     Results from last 7 days   Lab Units 11/16/22  0813 11/16/22  0158 11/15/22  2259 11/15/22  1912 11/15/22  1620 11/15/22  0525   PROCALCITONIN ng/mL  --   --   --   --   --  0.02   LACTATE mmol/L 2.0 2.1* 2.2* 3.6*   < >  --     < > = values in  this interval not displayed.     Glucose   Date/Time Value Ref Range Status   11/18/2022 1052 110 70 - 130 mg/dL Final     Comment:     Meter: [id number] : [id number] Timbo Cantu NA   11/18/2022 0546 85 70 - 130 mg/dL Final     Comment:     Meter: [id number] : [id number] Jamal Lewis NA   11/17/2022 2052 123 70 - 130 mg/dL Final     Comment:     Meter: [id number] : [id number] Jamal Lewis NA   11/17/2022 1647 103 70 - 130 mg/dL Final     Comment:     Meter: [id number] : [id number] Lichtefelipe Maria C NA   11/17/2022 1120 85 70 - 130 mg/dL Final     Comment:     Meter: [id number] : [id number] Johannatefelipe Maria C NA   11/17/2022 0641 80 70 - 130 mg/dL Final     Comment:     Meter: [id number] : [id number] Ramos Danitza NA   11/16/2022 2138 109 70 - 130 mg/dL Final     Comment:     Meter: [id number] : [id number] Ramos Danitza NA       No radiology results for the last day     Results for orders placed during the hospital encounter of 06/28/17    Adult Transthoracic Echo Complete    Interpretation Summary  · Left ventricular systolic function is normal. Calculated EF = 56.4%.  · Left ventricular diastolic dysfunction is noted (grade I) consistent with impaired relaxation  · Mild aortic valve regurgitation is present.  · Mild mitral valve regurgitation is present  · There is no evidence of pericardial effusion.        Scheduled Medications  azithromycin, 250 mg, Oral, Q24H  cefTRIAXone, 1 g, Intravenous, Q24H  enoxaparin, 40 mg, Subcutaneous, Q24H  FLUoxetine, 20 mg, Oral, Daily  furosemide, 40 mg, Oral, Daily  gabapentin, 300 mg, Oral, TID  insulin lispro, 0-7 Units, Subcutaneous, TID With Meals  ipratropium-albuterol, 3 mL, Nebulization, Q6H While Awake - RT  methylphenidate, 10 mg, Oral, Daily  Mirabegron ER, 25 mg, Oral, Daily  pantoprazole, 40 mg, Oral, BID  polyethylene glycol, 17 g, Oral, Daily  predniSONE, 40 mg, Oral, Daily With Breakfast  rOPINIRole, 0.5 mg, Oral,  Nightly  sodium chloride, 10 mL, Intravenous, Q12H  sucralfate, 1 g, Oral, 4x Daily AC & at Bedtime    Infusions   Diet  Diet Regular Texture (IDDSI 7); Regular Consistency; Regular/House Diet, Cardiac Diets; Healthy Heart (2-3 Na+)       Assessment/Plan     Active Hospital Problems    Diagnosis  POA   • **Acute exacerbation of chronic obstructive pulmonary disease (COPD) ([facility]) [J44.1]  Yes   • Leukocytosis [D72.829]  Yes   • Overweight (BMI 25.0-29.9) [E66.3]  Yes   • Hiatal hernia [K44.9]  Yes   • Rectal bleed [K62.5]  Yes   • Spinal stenosis, lumbar region with neurogenic claudication [M48.062]  Yes   • COPD exacerbation ([facility]) [J44.1]  Yes   • Peripheral neuropathy [G62.9]  Yes   • Chronic diastolic (congestive) heart failure (HCC) [I50.32]  Yes   • Prostate cancer ([facility]) [C61]  Yes   • ADD (attention deficit disorder) [F98.8]  Yes   • Hemorrhoids [K64.9]  Yes      Resolved Hospital Problems   No resolved problems to display.       82 y.o. male admitted with Acute exacerbation of chronic obstructive pulmonary disease (COPD) ([facility]).    Chronic obstructive pulmonary disease with chronic respiratory failure (On 2L home O2) with acute exacerbation complicated by new Leukocytosis  - Etiology likely multifactorial in the setting of medication noncompliance and comorbid medical conditions.  - CXR on admission negative for evidence of focal consolidation and/or interstitial pulmonary opacities.  - On initial examination, patient did not have signs/symptoms to suggest concomitant pneumonia warranting antibiotic therapy, however on repeat examination 11/15, patient more uncomfortable with more labored respirations, coupled with increasing productive cough of yellow sputum and dyspnea, rising WBC count.  - Subsequently ordered repeat infectious workup and started Ceftriaxone and Azithromycin.  - Repeat workup relatively unremarkable, however, on CXR, there could possibly be some consolidation.  - Nonetheless, since initiating  therapies as above, patient is improving.  - Continue ceftriaxone and azithromycin (day 4, 11/18).  Will  complete tomorrow on 11/19 to complete 5-day course.  - Continue prednisone [Final day of treatment today, 11/18] and inhalers as currently scheduled, Duonebs q6H scheduled + albuterol q4H PRN.  - Continue tessalon pearls, robitussin for symptom control.  - Order repeat CBC in AM for reassessment.  - Supplemental oxygen PRN to maintain O2 sat 88 to 92%.  - Continuous pulse oximetry and telemetry monitoring.  - Will need home oxygen evaluation and scheduled pulmonology follow up prior to discharge.    Chronic diastolic congestive heart failure  - Most recent 2D echocardiogram showing estimated EF 56.4% with grade 1 diastolic dysfunction.  - Patient felt to be hypervolemic on admission on physical exam on admission, likely Will nonadherence to diuretics as outpatient.  Subsequently given IV Lasix leading to edema and good response.  - Examination secondary patient appears stable from a cardiac perspective overall, does have bilateral lower extremity edema, however, per patient it is increased from prior.  Respiratory status is stable, no evidence of bilateral interstitial opacities on initial chest x-ray to suggest pulmonary vascular congestion from heart failure exacerbation.  - Continue home furosemide as previously prescribed.  - Close monitoring of renal function and electrolytes,  2g sodium diet, daily weights, strict I/O, continuous pulse oximetry,telemetry monitoring, elevate HOB, supplemental O2 PRN for O2 sat <90%.    Normocytic Anemia  - Hemoglobin stable from prior. No evidence of overt blood loss. No indications for acute intervention at this time.    - Order repeat CBC in AM for reassessment. Continue to monitor, transfuse for hemoglobin <7.    GERD/Radiation proctitis/Sigmoid diverticulosis/Colonic angioectasias  - Diagnoses as above, noted on EGD and Colonoscopy from September 2022.  - Appears  stable at this time, no evidence of GI bleeding, symptoms of GERD well controlled. Continue PPI and sucralfate as currently prescribed.      · Lovenox 40 mg SC daily for DVT prophylaxis.  · Full code.  · Discussed with patient, nursing staff, CCP and care team on multidisciplinary rounds.  · Anticipate discharge home with  vs SNU facility in 1-2 days.      Scar Tirado DO  [facility]  11/18/22  16:19 EST

## 2022-11-19 ENCOUNTER — READMISSION MANAGEMENT (OUTPATIENT)
Dept: CALL CENTER | Facility: HOSPITAL | Age: 82
End: 2022-11-19

## 2022-11-19 VITALS
TEMPERATURE: 97.2 F | WEIGHT: 179.4 LBS | HEIGHT: 71 IN | RESPIRATION RATE: 20 BRPM | OXYGEN SATURATION: 92 % | BODY MASS INDEX: 25.11 KG/M2 | HEART RATE: 90 BPM | DIASTOLIC BLOOD PRESSURE: 66 MMHG | SYSTOLIC BLOOD PRESSURE: 110 MMHG

## 2022-11-19 LAB
ANION GAP SERPL CALCULATED.3IONS-SCNC: 7.5 MMOL/L (ref 5–15)
BASOPHILS # BLD AUTO: 0.01 10*3/MM3 (ref 0–0.2)
BASOPHILS NFR BLD AUTO: 0.1 % (ref 0–1.5)
BUN SERPL-MCNC: 34 MG/DL (ref 8–23)
BUN/CREAT SERPL: 37.4 (ref 7–25)
CALCIUM SPEC-SCNC: 8.6 MG/DL (ref 8.6–10.5)
CHLORIDE SERPL-SCNC: 101 MMOL/L (ref 98–107)
CO2 SERPL-SCNC: 28.5 MMOL/L (ref 22–29)
CREAT SERPL-MCNC: 0.91 MG/DL (ref 0.76–1.27)
DEPRECATED RDW RBC AUTO: 42.4 FL (ref 37–54)
EGFRCR SERPLBLD CKD-EPI 2021: 83.1 ML/MIN/1.73
EOSINOPHIL # BLD AUTO: 0.01 10*3/MM3 (ref 0–0.4)
EOSINOPHIL NFR BLD AUTO: 0.1 % (ref 0.3–6.2)
ERYTHROCYTE [DISTWIDTH] IN BLOOD BY AUTOMATED COUNT: 11.8 % (ref 12.3–15.4)
GLUCOSE BLDC GLUCOMTR-MCNC: 100 MG/DL (ref 70–130)
GLUCOSE BLDC GLUCOMTR-MCNC: 137 MG/DL (ref 70–130)
GLUCOSE BLDC GLUCOMTR-MCNC: 87 MG/DL (ref 70–130)
GLUCOSE SERPL-MCNC: 92 MG/DL (ref 65–99)
HCT VFR BLD AUTO: 41.9 % (ref 37.5–51)
HGB BLD-MCNC: 13.8 G/DL (ref 13–17.7)
IMM GRANULOCYTES # BLD AUTO: 0.07 10*3/MM3 (ref 0–0.05)
IMM GRANULOCYTES NFR BLD AUTO: 0.8 % (ref 0–0.5)
LYMPHOCYTES # BLD AUTO: 2.6 10*3/MM3 (ref 0.7–3.1)
LYMPHOCYTES NFR BLD AUTO: 30.3 % (ref 19.6–45.3)
MCH RBC QN AUTO: 32.4 PG (ref 26.6–33)
MCHC RBC AUTO-ENTMCNC: 32.9 G/DL (ref 31.5–35.7)
MCV RBC AUTO: 98.4 FL (ref 79–97)
MONOCYTES # BLD AUTO: 0.64 10*3/MM3 (ref 0.1–0.9)
MONOCYTES NFR BLD AUTO: 7.5 % (ref 5–12)
NEUTROPHILS NFR BLD AUTO: 5.25 10*3/MM3 (ref 1.7–7)
NEUTROPHILS NFR BLD AUTO: 61.2 % (ref 42.7–76)
NRBC BLD AUTO-RTO: 0 /100 WBC (ref 0–0.2)
PLATELET # BLD AUTO: 213 10*3/MM3 (ref 140–450)
PMV BLD AUTO: 9.7 FL (ref 6–12)
POTASSIUM SERPL-SCNC: 4.1 MMOL/L (ref 3.5–5.2)
RBC # BLD AUTO: 4.26 10*6/MM3 (ref 4.14–5.8)
SODIUM SERPL-SCNC: 137 MMOL/L (ref 136–145)
WBC NRBC COR # BLD: 8.58 10*3/MM3 (ref 3.4–10.8)

## 2022-11-19 PROCEDURE — 94760 N-INVAS EAR/PLS OXIMETRY 1: CPT

## 2022-11-19 PROCEDURE — 94618 PULMONARY STRESS TESTING: CPT

## 2022-11-19 PROCEDURE — 80048 BASIC METABOLIC PNL TOTAL CA: CPT | Performed by: STUDENT IN AN ORGANIZED HEALTH CARE EDUCATION/TRAINING PROGRAM

## 2022-11-19 PROCEDURE — 25010000002 CEFTRIAXONE PER 250 MG: Performed by: STUDENT IN AN ORGANIZED HEALTH CARE EDUCATION/TRAINING PROGRAM

## 2022-11-19 PROCEDURE — 94799 UNLISTED PULMONARY SVC/PX: CPT

## 2022-11-19 PROCEDURE — 85025 COMPLETE CBC W/AUTO DIFF WBC: CPT | Performed by: STUDENT IN AN ORGANIZED HEALTH CARE EDUCATION/TRAINING PROGRAM

## 2022-11-19 PROCEDURE — 82962 GLUCOSE BLOOD TEST: CPT

## 2022-11-19 PROCEDURE — 63710000001 PREDNISONE PER 1 MG: Performed by: STUDENT IN AN ORGANIZED HEALTH CARE EDUCATION/TRAINING PROGRAM

## 2022-11-19 PROCEDURE — 94761 N-INVAS EAR/PLS OXIMETRY MLT: CPT

## 2022-11-19 RX ORDER — GUAIFENESIN 200 MG/10ML
200 LIQUID ORAL EVERY 4 HOURS PRN
Qty: 118 ML | Refills: 1 | Status: SHIPPED | OUTPATIENT
Start: 2022-11-19 | End: 2022-11-19 | Stop reason: SDUPTHER

## 2022-11-19 RX ORDER — ROPINIROLE 0.5 MG/1
0.5 TABLET, FILM COATED ORAL NIGHTLY
Qty: 90 TABLET | Refills: 1 | Status: SHIPPED | OUTPATIENT
Start: 2022-11-19

## 2022-11-19 RX ORDER — ALBUTEROL SULFATE 2.5 MG/3ML
2.5 SOLUTION RESPIRATORY (INHALATION) EVERY 6 HOURS PRN
Qty: 360 ML | Refills: 3 | Status: SHIPPED | OUTPATIENT
Start: 2022-11-19

## 2022-11-19 RX ORDER — FUROSEMIDE 40 MG/1
40 TABLET ORAL DAILY
Qty: 30 TABLET | Refills: 3 | Status: SHIPPED | OUTPATIENT
Start: 2022-11-19 | End: 2022-11-19 | Stop reason: SDUPTHER

## 2022-11-19 RX ORDER — BENZONATATE 200 MG/1
200 CAPSULE ORAL 3 TIMES DAILY PRN
Qty: 30 CAPSULE | Refills: 1 | Status: SHIPPED | OUTPATIENT
Start: 2022-11-19 | End: 2022-11-19 | Stop reason: SDUPTHER

## 2022-11-19 RX ORDER — ALBUTEROL SULFATE 2.5 MG/3ML
2.5 SOLUTION RESPIRATORY (INHALATION) EVERY 6 HOURS PRN
Qty: 360 ML | Refills: 3 | Status: SHIPPED | OUTPATIENT
Start: 2022-11-19 | End: 2022-11-19 | Stop reason: SDUPTHER

## 2022-11-19 RX ORDER — GUAIFENESIN 200 MG/10ML
200 LIQUID ORAL EVERY 4 HOURS PRN
Qty: 118 ML | Refills: 1 | Status: SHIPPED | OUTPATIENT
Start: 2022-11-19

## 2022-11-19 RX ORDER — FUROSEMIDE 40 MG/1
40 TABLET ORAL DAILY
Qty: 30 TABLET | Refills: 3 | Status: SHIPPED | OUTPATIENT
Start: 2022-11-19 | End: 2022-12-29

## 2022-11-19 RX ORDER — ROPINIROLE 0.5 MG/1
0.5 TABLET, FILM COATED ORAL NIGHTLY
Qty: 90 TABLET | Refills: 1 | Status: SHIPPED | OUTPATIENT
Start: 2022-11-19 | End: 2022-11-19 | Stop reason: SDUPTHER

## 2022-11-19 RX ORDER — BENZONATATE 200 MG/1
200 CAPSULE ORAL 3 TIMES DAILY PRN
Qty: 30 CAPSULE | Refills: 1 | Status: SHIPPED | OUTPATIENT
Start: 2022-11-19 | End: 2022-12-29

## 2022-11-19 RX ADMIN — PREDNISONE 40 MG: 20 TABLET ORAL at 08:46

## 2022-11-19 RX ADMIN — FLUOXETINE HYDROCHLORIDE 20 MG: 20 CAPSULE ORAL at 08:46

## 2022-11-19 RX ADMIN — SUCRALFATE 1 G: 1 TABLET ORAL at 16:04

## 2022-11-19 RX ADMIN — AZITHROMYCIN DIHYDRATE 250 MG: 250 TABLET, FILM COATED ORAL at 16:05

## 2022-11-19 RX ADMIN — Medication 10 ML: at 08:47

## 2022-11-19 RX ADMIN — FUROSEMIDE 40 MG: 40 TABLET ORAL at 08:46

## 2022-11-19 RX ADMIN — MIRABEGRON 25 MG: 25 TABLET, FILM COATED, EXTENDED RELEASE ORAL at 08:47

## 2022-11-19 RX ADMIN — POLYETHYLENE GLYCOL 3350 17 G: 17 POWDER, FOR SOLUTION ORAL at 08:46

## 2022-11-19 RX ADMIN — CEFTRIAXONE SODIUM 1 G: 1 INJECTION, POWDER, FOR SOLUTION INTRAMUSCULAR; INTRAVENOUS at 16:04

## 2022-11-19 RX ADMIN — SUCRALFATE 1 G: 1 TABLET ORAL at 12:16

## 2022-11-19 RX ADMIN — PANTOPRAZOLE SODIUM 40 MG: 40 TABLET, DELAYED RELEASE ORAL at 08:47

## 2022-11-19 RX ADMIN — IPRATROPIUM BROMIDE AND ALBUTEROL SULFATE 3 ML: 2.5; .5 SOLUTION RESPIRATORY (INHALATION) at 07:48

## 2022-11-19 RX ADMIN — SUCRALFATE 1 G: 1 TABLET ORAL at 06:19

## 2022-11-19 RX ADMIN — GABAPENTIN 300 MG: 300 CAPSULE ORAL at 16:05

## 2022-11-19 RX ADMIN — GABAPENTIN 300 MG: 300 CAPSULE ORAL at 08:47

## 2022-11-19 RX ADMIN — METHYLPHENIDATE HYDROCHLORIDE 10 MG: 5 TABLET ORAL at 08:47

## 2022-11-19 NOTE — PLAN OF CARE
Goal Outcome Evaluation:      Pt preparing for d/c today.  V/s stable, pt stable, education complete.  Per pt he is planning on driving himself back to [facility].  Care plan goals and d/c goals met. No distress, needs, pain at this time.  Cont to monitor until d/c.

## 2022-11-19 NOTE — SIGNIFICANT NOTE
Exercise Oximetry    Patient Name:Tony Leonard   MRN: [id number]   Date: 11/19/22             ROOM AIR BASELINE   SpO2% 91   Heart Rate 82   Blood Pressure      EXERCISE ON ROOM AIR SpO2% EXERCISE ON O2 @ *** LPM SpO2%   1 MINUTE 91 1 MINUTE    2 MINUTES 90 2 MINUTES    3 MINUTES 86 3 MINUTES    4 MINUTES  4 MINUTES    5 MINUTES  5 MINUTES    6 MINUTES  6 MINUTES               Distance Walked  to nurses station 50 ft Distance Walked   Dyspnea (Leti Scale)   Dyspnea (Leti Scale)   Fatigue (Leti Scale) Fatigue (Leti Scale)   SpO2% Post Exercise   SpO2% Post Exercise   HR Post Exercise   HR Post Exercise   Time to Recovery   Time to Recovery     Comments: patient walked to nurses station . Said he was done ready to leave and started to walk off unit with walker. Didn't wish to finish as he wanted to leave

## 2022-11-19 NOTE — PLAN OF CARE
Problem: Pain Acute  Goal: Acceptable Pain Control and Functional Ability  Outcome: Ongoing, Progressing     Problem: Fall Injury Risk  Goal: Absence of Fall and Fall-Related Injury  Outcome: Ongoing, Progressing   Goal Outcome Evaluation:           Progress: no change  Outcome Evaluation: Pt appeared to sleep well, alert and oriented x4, coop with care, hard of hearing, no resp distress, remains on oxygen at 2l, up to br with walker and standby.

## 2022-11-19 NOTE — DISCHARGE SUMMARY
Patient Name: Tony Leonard  : 1940  MRN: [id number]    Date of Admission: 2022  Date of Discharge:  2022  Primary Care Physician: Erich Aviles MD      Chief Complaint:   Shortness of Breath, Black or Bloody Stool, and Back Pain      Discharge Diagnoses     Active Hospital Problems    Diagnosis  POA   • **Acute exacerbation of chronic obstructive pulmonary disease (COPD) (HCC) [J44.1]  Yes   • Leukocytosis [D72.829]  Yes   • Overweight (BMI 25.0-29.9) [E66.3]  Yes   • Hiatal hernia [K44.9]  Yes   • Rectal bleed [K62.5]  Yes   • Spinal stenosis, lumbar region with neurogenic claudication [M48.062]  Yes   • COPD exacerbation (HCC) [J44.1]  Yes   • Peripheral neuropathy [G62.9]  Yes   • Chronic diastolic (congestive) heart failure (HCC) [I50.32]  Yes   • Prostate cancer (HCC) [C61]  Yes   • ADD (attention deficit disorder) [F98.8]  Yes   • Hemorrhoids [K64.9]  Yes      Resolved Hospital Problems   No resolved problems to display.        Hospital Course     Mr. Leonard is a 82 y.o. male with a history of COPD with chronic respiratory failure on 2L home O2, HFpEF who presented to [facility] initially complaining of progressive dyspnea, productive cough, weakness  Please see the admitting history and physical for further details.  He was found to have acute COPD exacerbation and was admitted to the hospital for further evaluation and treatment.    Chronic obstructive pulmonary disease with chronic respiratory failure (On 2L home O2) with acute exacerbation complicated by new Leukocytosis  - Etiology likely multifactorial in the setting of medication noncompliance, as patient stated his nebulizer would not work properly for him prior to admission so he could not use.  - CXR on admission negative for evidence of focal consolidation and/or interstitial pulmonary opacities.  - On initial examination, patient did not have signs/symptoms to suggest concomitant pneumonia warranting  antibiotic therapy, however on repeat examination 11/15, patient more uncomfortable with more labored respirations, coupled with increasing productive cough of yellow sputum and dyspnea, rising WBC count. Repeat infectious workup relatively unremarkable, however, on CXR, there could possibly be some consolidation.  - Subsequently started Ceftriaxone and Azithromycin for empiric antibiotic therapy due to concern for concomitant CAP.  - Following initiation of antibiotics and continuation of duonebs, albuterol and prednisone for COPD exacerbation, patient subsequently improved each day. He completed treatment with antibiotics, steroids prior to discharge. On day of discharge, patient was stable overall, on room air with 02 sat >90%, resting comfortably, with respiratory symptoms resolved.  - Six minute walk test performed prior to discharge: Continue home supplemental O2 at 2L as previously prescribed.  - Continue GDMT for COPD, based on guidelines, including most recent prior PFT showing severe obstruction (2016) with LAMA/LABA/ICS [Anoro Ellipta + Pulmicort] and PRN albuterol.   - Outpatient repeat PFT ordered.  - Referral to Pulmonary rehabilitation.  - Ambulatory referral to Pulmonology for outpatient follow up to guide ongoing management decisions.     Chronic diastolic congestive heart failure  - Most recent 2D echocardiogram showing estimated EF 56.4% with grade 1 diastolic dysfunction.  - Patient felt to be hypervolemic on admission with slightly worse lower extremity edema (per patient), although, not enough to suggest overt HFpEF exacerbation, no evidence of bilateral interstitial opacities on initial chest x-ray or prominent JVD. Slight worsening of LE edema likely 2/2 nonadherence to diuretics as outpatient.  Subsequently given IV Lasix leading to edema and good response. Thereafter, he was continued on home furosemide and on subsequent examinations, he appeared stable from a cardiac perspective  overall.  - Continue home furosemide as previously prescribed at time of discharge.  - Provided discharge instructions to call PCP or return to hospital if he noticed/experienced any of the following: weight gain of more than 3 pounds in 1 day or weight gain of 5 pounds or more in 1 week, worsening shortness of breath, chest pain, worsening swelling of your feet/ankles/legs or stomach, increased fatigue, increased cough, worsening shortness of breath while lying flat, fever, chills.  - Recommend follow up with cardiology and PCP after discharge for reassessment to guide ongoing management decisions.     Normocytic Anemia  - Hemoglobin stable from prior. No evidence of overt blood loss. No indications for acute intervention at this time.    - Recommend repeat CBC with PCP within 1 week for reassessment.    GERD/Radiation proctitis/Sigmoid diverticulosis/Colonic angioectasias  - Diagnoses as above, noted on EGD and Colonoscopy from September 2022.  - Appears stable at this time, no evidence of GI bleeding, symptoms of GERD well controlled. Continue PPI and sucralfate as currently prescribed.    Day of Discharge     Subjective:  Patient seen and examined this morning. Hospital day 6.  Discussed with nursing, doing well overall.  Significantly improved from admission, respiratory symptoms of dyspnea and cough now resolved, per patient.  Patient continues to improve each day.  At time of my examination this morning, patient is awake, alert, resting comfortably,  on  room air with O2 sat >90%.  Asking to go home.     Physical Exam:  Temp:  [97.2 °F (36.2 °C)-98 °F (36.7 °C)] 97.2 °F (36.2 °C)  Heart Rate:  [61-95] 90  Resp:  [16-20] 20  BP: (100-140)/(66-76) 110/66  Body mass index is 25.02 kg/m².  Physical Exam  Vitals and nursing note reviewed.   Constitutional:       General: He is awake. He is not in acute distress.     Comments: Elderly, frail appearing   HENT:      Head: Atraumatic.   Eyes:      General: No scleral  icterus.     Conjunctiva/sclera: Conjunctivae normal.   Cardiovascular:      Rate and Rhythm: Normal rate and regular rhythm.      Pulses: Normal pulses.      Heart sounds: Normal heart sounds.   Pulmonary:      Effort: Pulmonary effort is normal. No respiratory distress.      Breath sounds: No decreased breath sounds or wheezing (resolved).      Comments: On room air  Abdominal:      General: Bowel sounds are normal.      Palpations: Abdomen is soft.      Tenderness: There is no abdominal tenderness.   Musculoskeletal:      Right lower leg: Edema present.      Left lower leg: Edema present.   Skin:     General: Skin is warm and dry.      Comments: Chronic venous stasis changes B/L LE   Neurological:      General: No focal deficit present.      Mental Status: He is alert and oriented to person, place, and time.   Psychiatric:         Behavior: Behavior is cooperative.         Consultants     Consult Orders (all) (From admission, onward)     Start     Ordered    11/14/22 1536  Inpatient Cardiology Consult  Once        Specialty:  Cardiology  Provider:  Ankit Gonzalez MD    11/14/22 1536    11/14/22 0738  Inpatient Nutrition Consult - BMI 25-29.9  Once        Provider:  (Not yet assigned)    11/14/22 0739    11/13/22 1727  [facility] (on-call MD unless specified) Details  Once        Specialty:  Hospitalist  Provider:  (Not yet assigned)    11/13/22 1726    11/13/22 1650  [facility] (on-call MD unless specified) Details  Once        Specialty:  Hospitalist  Provider:  (Not yet assigned)    11/13/22 1650              Procedures     * Surgery not found *      Imaging Results (All)     Procedure Component Value Units Date/Time    XR Chest 1 View [[id number]] Collected: 11/15/22 1600     Updated: 11/15/22 1605    Narrative:      XR CHEST 1 VW-     HISTORY: Male who is 82 years-old,  pneumonia     TECHNIQUE: Frontal view of the chest     COMPARISON: 11/13/2022     FINDINGS: The heart size is borderline. Aorta is calcified.  Pulmonary  vasculature is unremarkable. Lung volume is low. Minimal likely  atelectasis at the bases. No focal pulmonary consolidation, pleural  effusion, or pneumothorax. No acute osseous process.       Impression:      Minimal likely atelectasis at the bases. Borderline heart  size. Follow-up as clinical indications persist.     This report was finalized on 11/15/2022 4:01 PM by Dr. Tj Evans M.D.       XR Chest 2 View [[id number]] Collected: 11/13/22 1452     Updated: 11/13/22 1455    Narrative:      TWO-VIEW CHEST     HISTORY: Shortness of breath.     FINDINGS: The lungs are well-expanded and clear and the heart size is  normal. There is no acute disease or change from 05/07/2022.     This report was finalized on 11/13/2022 2:52 PM by Dr. Julio Bonilla M.D.             Results for orders placed during the hospital encounter of 06/28/17    Adult Transthoracic Echo Complete    Interpretation Summary  · Left ventricular systolic function is normal. Calculated EF = 56.4%.  · Left ventricular diastolic dysfunction is noted (grade I) consistent with impaired relaxation  · Mild aortic valve regurgitation is present.  · Mild mitral valve regurgitation is present  · There is no evidence of pericardial effusion.    Pertinent Labs     Results from last 7 days   Lab Units 11/19/22  0640 11/18/22  0545 11/17/22  0604 11/16/22  0158   WBC 10*3/mm3 8.58 8.39 9.88 14.47*   HEMOGLOBIN g/dL 13.8 13.1 12.7* 12.0*   PLATELETS 10*3/mm3 213 224 200 215     Results from last 7 days   Lab Units 11/19/22  0640 11/18/22  0545 11/17/22  0604 11/16/22  0158   SODIUM mmol/L 137 141 140 142   POTASSIUM mmol/L 4.1 4.1 4.0 3.6   CHLORIDE mmol/L 101 103 103 103   CO2 mmol/L 28.5 28.2 29.0 28.1   BUN mg/dL 34* 32* 29* 35*   CREATININE mg/dL 0.91 0.93 0.85 1.08   GLUCOSE mg/dL 92 94 97 126*   EGFR mL/min/1.73 83.1 81.0 85.7 67.7     Results from last 7 days   Lab Units 11/14/22  0609 11/13/22  1437   ALBUMIN g/dL 3.90 3.80   BILIRUBIN  mg/dL 0.3 0.4   ALK PHOS U/L 75 86   AST (SGOT) U/L 18 27   ALT (SGPT) U/L 15 18     Results from last 7 days   Lab Units 11/19/22  0640 11/18/22  0545 11/17/22  0604 11/16/22  0158 11/15/22  0525 11/14/22  0609 11/13/22  1437   CALCIUM mg/dL 8.6 9.1 8.6 8.7   < > 9.4 9.0   ALBUMIN g/dL  --   --   --   --   --  3.90 3.80    < > = values in this interval not displayed.       Results from last 7 days   Lab Units 11/13/22  1437   TROPONIN T ng/mL <0.010   PROBNP pg/mL 457.0           Invalid input(s): LDLCALC  Results from last 7 days   Lab Units 11/15/22  1823 11/15/22  1626 11/15/22  1620   BLOODCX   --  No growth at 3 days No growth at 3 days   RESPCX  Rejected  --   --      Results from last 7 days   Lab Units 11/13/22  1546   COVID19  Not Detected       Test Results Pending at Discharge     Pending Labs     Order Current Status    Blood Culture - Blood, Hand, Left Preliminary result    Blood Culture - Blood, Hand, Right Preliminary result          Discharge Details        Discharge Medications      New Medications      Instructions Start Date   benzonatate 200 MG capsule  Commonly known as: TESSALON   200 mg, Oral, 3 Times Daily PRN      budesonide 180 MCG/ACT inhaler  Commonly known as: PULMICORT  Replaces: budesonide 0.5 MG/2ML nebulizer solution   2 puffs, Inhalation, 2 Times Daily      guaifenesin 100 MG/5ML liquid  Commonly known as: ROBITUSSIN   200 mg, Oral, Every 4 Hours PRN         Changes to Medications      Instructions Start Date   albuterol (2.5 MG/3ML) 0.083% nebulizer solution  Commonly known as: PROVENTIL  What changed:   · when to take this  · reasons to take this  · Another medication with the same name was removed. Continue taking this medication, and follow the directions you see here.   2.5 mg, Nebulization, Every 6 Hours PRN      furosemide 40 MG tablet  Commonly known as: LASIX  What changed: when to take this   40 mg, Oral, Daily      Mirabegron ER 25 MG tablet sustained-release 24 hour 24  hr tablet  Commonly known as: Myrbetriq  What changed:   · how much to take  · how to take this  · when to take this   25 mg, Oral, Daily   Start Date: November 20, 2022     rOPINIRole 0.5 MG tablet  Commonly known as: REQUIP  What changed: See the new instructions.   0.5 mg, Oral, Nightly, Take 1 hour before bedtime.      Umeclidinium-Vilanterol 62.5-25 MCG/ACT aerosol powder  inhaler  Commonly known as: ANORO ELLIPTA  What changed: medication strength   1 puff, Inhalation, Daily         Continue These Medications      Instructions Start Date   acetaminophen 500 MG tablet  Commonly known as: TYLENOL   500 mg, Oral, Every 6 Hours PRN      calcium carbonate 500 MG chewable tablet  Commonly known as: TUMS   1 tablet, Oral, As Needed      colestipol 5 g granules  Commonly known as: COLESTID   5 g, Oral, 2 Times Daily, Prn after diarrhea      FLUoxetine 20 MG capsule  Commonly known as: PROzac   TAKE 1 CAPSULE DAILY      gabapentin 300 MG capsule  Commonly known as: NEURONTIN   300 mg, Oral, 3 Times Daily      methylphenidate 10 MG tablet  Commonly known as: Ritalin   10 mg, Oral, Daily, ADD      multivitamin with minerals tablet tablet   1 tablet, Oral, Daily      OXYGEN-HELIUM IN   2 L, Inhalation, Daily PRN      polyethylene glycol 17 g packet  Commonly known as: MIRALAX   DISSOLVE ONE PACKET IN 8OZ LIQUID & DRINK BY MOUTH DAILY      potassium chloride 20 MEQ CR tablet  Commonly known as: K-DUR,KLOR-CON   1 tablet, Oral, Daily      Rollator Ultra-Light misc   1 application, Does not apply, Daily, Severe DJD      Salonpas 3.1-6-10 % patch  Generic drug: Camphor-Menthol-Methyl Sal   3 %, Apply externally, Every 12 Hours      sodium chloride 7 % nebulizer solution nebulizer solution   No dose, route, or frequency recorded.      sucralfate 1 GM/10ML suspension  Commonly known as: Carafate   1 g, Oral, 4 Times Daily      terbinafine 250 MG tablet  Commonly known as: lamiSIL   No dose, route, or frequency recorded.       TiZANidine 6 MG capsule  Commonly known as: ZANAFLEX   TAKE 1 CAPSULE EVERY NIGHT      Turmeric 500 MG capsule   Oral, Daily      vitamin B-12 2500 MCG sublingual tablet tablet  Commonly known as: CYANOCOBALAMIN   2,500 mcg, Oral, Every Evening         Stop These Medications    budesonide 0.5 MG/2ML nebulizer solution  Commonly known as: PULMICORT  Replaced by: budesonide 180 MCG/ACT inhaler        ASK your doctor about these medications      Instructions Start Date   pantoprazole 40 MG EC tablet  Commonly known as: PROTONIX   40 mg, Oral, Daily             Allergies   Allergen Reactions   • Daliresp [Roflumilast] Anaphylaxis   • Latex Rash   • Sulfa Antibiotics Rash       Discharge Disposition:  Home or Self Care      Discharge Diet:  Diet Order   Procedures   • Diet Regular Texture (IDDSI 7); Regular Consistency; Regular/House Diet, Cardiac Diets; Healthy Heart (2-3 Na+)       Discharge Activity:   Activity Instructions     Activity as Tolerated            CODE STATUS:    Code Status and Medical Interventions:   Ordered at: 11/13/22 2022     Code Status (Patient has no pulse and is not breathing):    CPR (Attempt to Resuscitate)     Medical Interventions (Patient has pulse or is breathing):    Full Support       Future Appointments   Date Time Provider Department Center   12/27/2022  9:20 AM Andrzej Gordon MD [facility] [address] [facility]     Additional Instructions for the Follow-ups that You Need to Schedule     Discharge Follow-up with PCP   As directed       Currently Documented PCP:    Erich Aviles MD    PCP Phone Number:    [phone]     Follow Up Details: within 1 week after discharge for reassessment         Discharge Follow-up with Specialty: Pulmonology   As directed      Specialty: Pulmonology    Follow Up Details: Please follow-up with pulmonology in 1-2 weeks after discharge.  A referral has been placed for you to see [facility] pulmonology.         Referral to Cardiac Rehab   As directed       Pulmonary rehab in setting of severe COPD    Order Comments: Pulmonary rehab in setting of severe COPD          Basic Metabolic Panel    Nov 24, 2022 (Approximate)      Release to patient: Routine Release         CBC & Differential    Nov 24, 2022 (Approximate)      Manual Differential: No    Release to patient: Routine Release            Contact information for follow-up providers     Percy Ingram MD .    Specialty: Pulmonary Disease  Contact information:  [address]  [facility] [address]  [phone]             Erich Aviles MD .    Specialty: Internal Medicine  Why: within 1 week after discharge for reassessment  Contact information:  [address]  [facility] [address]  [phone]             [facility]ARD REH[facility] .    Specialty: Cardiac Rehabilitation  Contact information:  [address] [facility] [id number] [phone]                 Contact information for after-discharge care     Home Medical Care     [facility] .    Service: Home Health Services  Contact information:  [address] [facility] [address]  [facility] [address]  [phone]                             Additional Instructions for the Follow-ups that You Need to Schedule     Discharge Follow-up with PCP   As directed       Currently Documented PCP:    Erich Aviles MD    PCP Phone Number:    [phone]     Follow Up Details: within 1 week after discharge for reassessment         Discharge Follow-up with Specialty: Pulmonology   As directed      Specialty: Pulmonology    Follow Up Details: Please follow-up with pulmonology in 1-2 weeks after discharge.  A referral has been placed for you to see [facility] pulmonology.         Referral to Cardiac Rehab   As directed      Pulmonary rehab in setting of severe COPD    Order Comments: Pulmonary rehab in setting of severe COPD          Basic Metabolic Panel    Nov 24, 2022 (Approximate)      Release to patient:  Routine Release         CBC & Differential    Nov 24, 2022 (Approximate)      Manual Differential: No    Release to patient: Routine Release           Time Spent on Discharge:  Greater than 30 minutes      Scar Tirado DO  [facility]  11/19/22  16:00 EST

## 2022-11-20 LAB
BACTERIA SPEC AEROBE CULT: NORMAL
BACTERIA SPEC AEROBE CULT: NORMAL

## 2022-11-20 NOTE — OUTREACH NOTE
Prep Survey    Flowsheet Row Responses   [facility] patient discharged from? [facility]   Is LACE score < 7 ? No   Emergency Room discharge w/ pulse ox? No   Eligibility [facility]   Date of Admission 11/13/22   Date of Discharge 11/19/22   Discharge Disposition Home-Health Care Sv   Discharge diagnosis Acute exacerbation of chronic obstructive pulmonary disease    Does the patient have one of the following disease processes/diagnoses(primary or secondary)? COPD   Does the patient have Home health ordered? Yes   What is the Home health agency?  [facility]/following   Is there a DME ordered? No   Prep survey completed? Yes          SHARITA DC - Registered Nurse

## 2022-11-20 NOTE — CASE MANAGEMENT/SOCIAL WORK
Case Management Discharge Note      Final Note: [place]'d [place] with [facility] following    Provided Post Acute Provider List?: N/A  Provided Post Acute Provider Quality & Resource List?: N/A        Home Medical Care     Service Provider Selected Services Address Phone Fax Patient Preferred    [facility] [address] [place], [place] [id number] [phone] --               Transportation Services  Private: Car    Final Discharge Disposition Code: 06 - home with home health care

## 2022-11-21 ENCOUNTER — HOSPITAL ENCOUNTER (OUTPATIENT)
Facility: HOSPITAL | Age: 82
Setting detail: OBSERVATION
Discharge: NURSING, REHAB OR LONG-TERM CARE | End: 2022-11-23
Attending: EMERGENCY MEDICINE | Admitting: STUDENT IN AN ORGANIZED HEALTH CARE EDUCATION/TRAINING PROGRAM

## 2022-11-21 ENCOUNTER — TRANSITIONAL CARE MANAGEMENT TELEPHONE ENCOUNTER (OUTPATIENT)
Dept: CALL CENTER | Facility: HOSPITAL | Age: 82
End: 2022-11-21

## 2022-11-21 ENCOUNTER — APPOINTMENT (OUTPATIENT)
Dept: GENERAL RADIOLOGY | Facility: HOSPITAL | Age: 82
End: 2022-11-21

## 2022-11-21 DIAGNOSIS — R26.2 UNABLE TO AMBULATE: ICD-10-CM

## 2022-11-21 DIAGNOSIS — G89.29 CHRONIC LOW BACK PAIN WITH SCIATICA, SCIATICA LATERALITY UNSPECIFIED, UNSPECIFIED BACK PAIN LATERALITY: ICD-10-CM

## 2022-11-21 DIAGNOSIS — R53.81 MALAISE AND FATIGUE: ICD-10-CM

## 2022-11-21 DIAGNOSIS — M25.551 BILATERAL HIP PAIN: Primary | ICD-10-CM

## 2022-11-21 DIAGNOSIS — M25.552 BILATERAL HIP PAIN: Primary | ICD-10-CM

## 2022-11-21 DIAGNOSIS — M54.40 CHRONIC LOW BACK PAIN WITH SCIATICA, SCIATICA LATERALITY UNSPECIFIED, UNSPECIFIED BACK PAIN LATERALITY: ICD-10-CM

## 2022-11-21 DIAGNOSIS — M48.062 SPINAL STENOSIS, LUMBAR REGION WITH NEUROGENIC CLAUDICATION: ICD-10-CM

## 2022-11-21 DIAGNOSIS — R53.83 MALAISE AND FATIGUE: ICD-10-CM

## 2022-11-21 LAB
ANION GAP SERPL CALCULATED.3IONS-SCNC: 10 MMOL/L (ref 5–15)
BASOPHILS # BLD AUTO: 0.01 10*3/MM3 (ref 0–0.2)
BASOPHILS NFR BLD AUTO: 0.1 % (ref 0–1.5)
BUN SERPL-MCNC: 34 MG/DL (ref 8–23)
BUN/CREAT SERPL: 34 (ref 7–25)
CALCIUM SPEC-SCNC: 8.5 MG/DL (ref 8.6–10.5)
CHLORIDE SERPL-SCNC: 103 MMOL/L (ref 98–107)
CO2 SERPL-SCNC: 29 MMOL/L (ref 22–29)
CREAT SERPL-MCNC: 1 MG/DL (ref 0.76–1.27)
DEPRECATED RDW RBC AUTO: 44 FL (ref 37–54)
EGFRCR SERPLBLD CKD-EPI 2021: 74.2 ML/MIN/1.73
EOSINOPHIL # BLD AUTO: 0.55 10*3/MM3 (ref 0–0.4)
EOSINOPHIL NFR BLD AUTO: 4.9 % (ref 0.3–6.2)
ERYTHROCYTE [DISTWIDTH] IN BLOOD BY AUTOMATED COUNT: 12 % (ref 12.3–15.4)
GLUCOSE SERPL-MCNC: 87 MG/DL (ref 65–99)
HCT VFR BLD AUTO: 41.3 % (ref 37.5–51)
HGB BLD-MCNC: 13.7 G/DL (ref 13–17.7)
IMM GRANULOCYTES # BLD AUTO: 0.07 10*3/MM3 (ref 0–0.05)
IMM GRANULOCYTES NFR BLD AUTO: 0.6 % (ref 0–0.5)
LYMPHOCYTES # BLD AUTO: 2.23 10*3/MM3 (ref 0.7–3.1)
LYMPHOCYTES NFR BLD AUTO: 20 % (ref 19.6–45.3)
MCH RBC QN AUTO: 32.8 PG (ref 26.6–33)
MCHC RBC AUTO-ENTMCNC: 33.2 G/DL (ref 31.5–35.7)
MCV RBC AUTO: 98.8 FL (ref 79–97)
MONOCYTES # BLD AUTO: 1.12 10*3/MM3 (ref 0.1–0.9)
MONOCYTES NFR BLD AUTO: 10 % (ref 5–12)
NEUTROPHILS NFR BLD AUTO: 64.4 % (ref 42.7–76)
NEUTROPHILS NFR BLD AUTO: 7.18 10*3/MM3 (ref 1.7–7)
NRBC BLD AUTO-RTO: 0 /100 WBC (ref 0–0.2)
PLATELET # BLD AUTO: 217 10*3/MM3 (ref 140–450)
PMV BLD AUTO: 9.6 FL (ref 6–12)
POTASSIUM SERPL-SCNC: 4.7 MMOL/L (ref 3.5–5.2)
RBC # BLD AUTO: 4.18 10*6/MM3 (ref 4.14–5.8)
SODIUM SERPL-SCNC: 142 MMOL/L (ref 136–145)
WBC NRBC COR # BLD: 11.16 10*3/MM3 (ref 3.4–10.8)

## 2022-11-21 PROCEDURE — G0378 HOSPITAL OBSERVATION PER HR: HCPCS

## 2022-11-21 PROCEDURE — 73523 X-RAY EXAM HIPS BI 5/> VIEWS: CPT

## 2022-11-21 PROCEDURE — 94799 UNLISTED PULMONARY SVC/PX: CPT

## 2022-11-21 PROCEDURE — 72110 X-RAY EXAM L-2 SPINE 4/>VWS: CPT

## 2022-11-21 PROCEDURE — 25010000002 HYDROMORPHONE PER 4 MG: Performed by: EMERGENCY MEDICINE

## 2022-11-21 PROCEDURE — 25010000002 ONDANSETRON PER 1 MG: Performed by: EMERGENCY MEDICINE

## 2022-11-21 PROCEDURE — 99285 EMERGENCY DEPT VISIT HI MDM: CPT

## 2022-11-21 PROCEDURE — 96375 TX/PRO/DX INJ NEW DRUG ADDON: CPT

## 2022-11-21 PROCEDURE — 94640 AIRWAY INHALATION TREATMENT: CPT

## 2022-11-21 PROCEDURE — 96374 THER/PROPH/DIAG INJ IV PUSH: CPT

## 2022-11-21 PROCEDURE — 80048 BASIC METABOLIC PNL TOTAL CA: CPT | Performed by: EMERGENCY MEDICINE

## 2022-11-21 PROCEDURE — 85025 COMPLETE CBC W/AUTO DIFF WBC: CPT | Performed by: EMERGENCY MEDICINE

## 2022-11-21 RX ORDER — IPRATROPIUM BROMIDE AND ALBUTEROL SULFATE 2.5; .5 MG/3ML; MG/3ML
3 SOLUTION RESPIRATORY (INHALATION)
Status: DISCONTINUED | OUTPATIENT
Start: 2022-11-21 | End: 2022-11-23 | Stop reason: HOSPADM

## 2022-11-21 RX ORDER — SODIUM CHLORIDE 0.9 % (FLUSH) 0.9 %
10 SYRINGE (ML) INJECTION AS NEEDED
Status: DISCONTINUED | OUTPATIENT
Start: 2022-11-21 | End: 2022-11-23 | Stop reason: HOSPADM

## 2022-11-21 RX ORDER — ONDANSETRON 2 MG/ML
4 INJECTION INTRAMUSCULAR; INTRAVENOUS ONCE
Status: COMPLETED | OUTPATIENT
Start: 2022-11-21 | End: 2022-11-21

## 2022-11-21 RX ORDER — HYDROMORPHONE HYDROCHLORIDE 1 MG/ML
0.5 INJECTION, SOLUTION INTRAMUSCULAR; INTRAVENOUS; SUBCUTANEOUS ONCE
Status: COMPLETED | OUTPATIENT
Start: 2022-11-21 | End: 2022-11-21

## 2022-11-21 RX ORDER — GABAPENTIN 300 MG/1
300 CAPSULE ORAL EVERY 8 HOURS SCHEDULED
Status: DISCONTINUED | OUTPATIENT
Start: 2022-11-21 | End: 2022-11-23 | Stop reason: HOSPADM

## 2022-11-21 RX ORDER — SODIUM CHLORIDE 9 MG/ML
40 INJECTION, SOLUTION INTRAVENOUS AS NEEDED
Status: DISCONTINUED | OUTPATIENT
Start: 2022-11-21 | End: 2022-11-23 | Stop reason: HOSPADM

## 2022-11-21 RX ORDER — PANTOPRAZOLE SODIUM 40 MG/1
40 TABLET, DELAYED RELEASE ORAL EVERY MORNING
Status: DISCONTINUED | OUTPATIENT
Start: 2022-11-22 | End: 2022-11-23 | Stop reason: HOSPADM

## 2022-11-21 RX ORDER — SODIUM CHLORIDE 0.9 % (FLUSH) 0.9 %
10 SYRINGE (ML) INJECTION EVERY 12 HOURS SCHEDULED
Status: DISCONTINUED | OUTPATIENT
Start: 2022-11-21 | End: 2022-11-23 | Stop reason: HOSPADM

## 2022-11-21 RX ORDER — GUAIFENESIN 200 MG/10ML
200 LIQUID ORAL EVERY 4 HOURS PRN
Status: DISCONTINUED | OUTPATIENT
Start: 2022-11-21 | End: 2022-11-23 | Stop reason: HOSPADM

## 2022-11-21 RX ORDER — FUROSEMIDE 40 MG/1
40 TABLET ORAL DAILY
Status: DISCONTINUED | OUTPATIENT
Start: 2022-11-21 | End: 2022-11-23 | Stop reason: HOSPADM

## 2022-11-21 RX ORDER — HYDROCODONE BITARTRATE AND ACETAMINOPHEN 7.5; 325 MG/1; MG/1
1 TABLET ORAL EVERY 6 HOURS PRN
Status: DISCONTINUED | OUTPATIENT
Start: 2022-11-21 | End: 2022-11-23 | Stop reason: HOSPADM

## 2022-11-21 RX ORDER — MULTIPLE VITAMINS W/ MINERALS TAB 9MG-400MCG
1 TAB ORAL DAILY
Status: DISCONTINUED | OUTPATIENT
Start: 2022-11-21 | End: 2022-11-23 | Stop reason: HOSPADM

## 2022-11-21 RX ORDER — ALBUTEROL SULFATE 2.5 MG/3ML
2.5 SOLUTION RESPIRATORY (INHALATION) EVERY 6 HOURS PRN
Status: DISCONTINUED | OUTPATIENT
Start: 2022-11-21 | End: 2022-11-23 | Stop reason: HOSPADM

## 2022-11-21 RX ORDER — BUDESONIDE 0.5 MG/2ML
0.5 INHALANT ORAL
Status: DISCONTINUED | OUTPATIENT
Start: 2022-11-21 | End: 2022-11-23 | Stop reason: HOSPADM

## 2022-11-21 RX ORDER — FLUOXETINE HYDROCHLORIDE 20 MG/1
20 CAPSULE ORAL DAILY
Status: DISCONTINUED | OUTPATIENT
Start: 2022-11-21 | End: 2022-11-23 | Stop reason: HOSPADM

## 2022-11-21 RX ORDER — TIZANIDINE 4 MG/1
6 TABLET ORAL NIGHTLY PRN
Status: DISCONTINUED | OUTPATIENT
Start: 2022-11-21 | End: 2022-11-23 | Stop reason: HOSPADM

## 2022-11-21 RX ORDER — ROPINIROLE 0.5 MG/1
0.5 TABLET, FILM COATED ORAL NIGHTLY
Status: DISCONTINUED | OUTPATIENT
Start: 2022-11-21 | End: 2022-11-23 | Stop reason: HOSPADM

## 2022-11-21 RX ADMIN — HYDROMORPHONE HYDROCHLORIDE 0.5 MG: 1 INJECTION, SOLUTION INTRAMUSCULAR; INTRAVENOUS; SUBCUTANEOUS at 09:03

## 2022-11-21 RX ADMIN — GABAPENTIN 300 MG: 300 CAPSULE ORAL at 21:10

## 2022-11-21 RX ADMIN — IPRATROPIUM BROMIDE AND ALBUTEROL SULFATE 3 ML: 2.5; .5 SOLUTION RESPIRATORY (INHALATION) at 22:42

## 2022-11-21 RX ADMIN — MULTIPLE VITAMINS W/ MINERALS TAB 1 TABLET: TAB at 17:48

## 2022-11-21 RX ADMIN — MIRABEGRON 25 MG: 25 TABLET, FILM COATED, EXTENDED RELEASE ORAL at 21:09

## 2022-11-21 RX ADMIN — HYDROCODONE BITARTRATE AND ACETAMINOPHEN 1 TABLET: 7.5; 325 TABLET ORAL at 16:20

## 2022-11-21 RX ADMIN — ONDANSETRON 4 MG: 2 INJECTION INTRAMUSCULAR; INTRAVENOUS at 09:05

## 2022-11-21 RX ADMIN — HYDROCODONE BITARTRATE AND ACETAMINOPHEN 1 TABLET: 7.5; 325 TABLET ORAL at 23:48

## 2022-11-21 RX ADMIN — FUROSEMIDE 40 MG: 40 TABLET ORAL at 17:48

## 2022-11-21 RX ADMIN — Medication 10 ML: at 13:28

## 2022-11-21 RX ADMIN — FLUOXETINE HYDROCHLORIDE 20 MG: 20 CAPSULE ORAL at 17:48

## 2022-11-21 RX ADMIN — IPRATROPIUM BROMIDE AND ALBUTEROL SULFATE 3 ML: 2.5; .5 SOLUTION RESPIRATORY (INHALATION) at 16:26

## 2022-11-21 RX ADMIN — GABAPENTIN 300 MG: 300 CAPSULE ORAL at 16:20

## 2022-11-21 RX ADMIN — ROPINIROLE HYDROCHLORIDE 0.5 MG: 0.5 TABLET, FILM COATED ORAL at 21:09

## 2022-11-21 RX ADMIN — BUDESONIDE 0.5 MG: 0.5 INHALANT ORAL at 22:45

## 2022-11-21 NOTE — PROGRESS NOTES
Clinical Pharmacy Services: Medication History    Tony Leonard is a 82 y.o. male presenting to [facility] for   Chief Complaint   Patient presents with   • Hip Pain   • Leg Pain       He  has a past medical history of ADHD (attention deficit hyperactivity disorder), Bronchiectasis (formerly [facility]), Colon polyp, COPD (chronic obstructive pulmonary disease) (formerly [facility]), Diverticulosis, Hard of hearing, On home oxygen therapy, Pneumonia, Prostate cancer (formerly [facility]) (04/22/2019), and Spinal stenosis, lumbar region with neurogenic claudication (08/02/2022).    Allergies as of 11/21/2022 - Reviewed 11/21/2022   Allergen Reaction Noted   • Daliresp [roflumilast] Anaphylaxis 12/14/2018   • Latex Rash 03/10/2016   • Sulfa antibiotics Rash 03/10/2016       Medication information was obtained from: Patient   Pharmacy and Phone Number:     Prior to Admission Medications     Prescriptions Last Dose Informant Patient Reported? Taking?    acetaminophen (TYLENOL) 500 MG tablet Past Week Self Yes Yes    Take 500 mg by mouth Every 6 (Six) Hours As Needed for Mild Pain.    albuterol (PROVENTIL) (2.5 MG/3ML) 0.083% nebulizer solution 11/20/2022 Self No Yes    Take 2.5 mg by nebulization Every 6 (Six) Hours As Needed for Wheezing.    calcium carbonate (TUMS) 500 MG chewable tablet Past Month Self Yes Yes    Chew 1 tablet As Needed for Indigestion or Heartburn.    Camphor-Menthol-Methyl Sal (Salonpas) 3.1-6-10 % patch 11/20/2022 Self Yes Yes    Apply 3 % topically Every 12 (Twelve) Hours.    FLUoxetine (PROzac) 20 MG capsule 11/20/2022 Self No Yes    TAKE 1 CAPSULE DAILY    Patient taking differently:  Take 20 mg by mouth Daily.    furosemide (LASIX) 40 MG tablet 11/20/2022 Self No Yes    Take 1 tablet by mouth Daily.    gabapentin (NEURONTIN) 300 MG capsule 11/20/2022 Self No Yes    Take 1 capsule by mouth 3 (Three) Times a Day.    guaifenesin (ROBITUSSIN) 100 MG/5ML liquid Past Week Self No Yes    Take 10 mL by mouth Every 4 (Four) Hours  As Needed for Cough.    methylphenidate (Ritalin) 10 MG tablet 11/20/2022 Self No Yes    Take 1 tablet by mouth Daily. ADD    Patient taking differently:  Take 10 mg by mouth Daily.    Multiple Vitamins-Minerals (MULTIVITAMIN ADULT PO) 11/20/2022 Self Yes Yes    Take 1 tablet by mouth Daily.    pantoprazole (PROTONIX) 40 MG EC tablet 11/20/2022 Self No Yes    Take 1 tablet by mouth Daily.    Patient taking differently:  Take 40 mg by mouth 2 (Two) Times a Day.    potassium chloride (K-DUR,KLOR-CON) 20 MEQ CR tablet Past Week Self Yes Yes    Take 1 tablet by mouth Daily.    rOPINIRole (REQUIP) 0.5 MG tablet 11/20/2022 Self No Yes    Take 1 tablet by mouth Every Night. Take 1 hour before bedtime.    Patient taking differently:  Take 0.5 mg by mouth Every Night.    sodium chloride 7 % nebulizer solution nebulizer solution 11/20/2022 Self Yes Yes    Take 4 mL by nebulization As Needed.    terbinafine (lamiSIL) 250 MG tablet Past Week Self Yes Yes    Take 250 mg by mouth Daily.    TiZANidine (ZANAFLEX) 6 MG capsule 11/20/2022 Self No Yes    TAKE 1 CAPSULE EVERY NIGHT    Patient taking differently:  Take 6 mg by mouth At Night As Needed.    Turmeric 500 MG capsule 11/20/2022 Self Yes Yes    Take 500 mg by mouth Daily.    Umeclidinium-Vilanterol (ANORO ELLIPTA) 62.5-25 MCG/ACT aerosol powder  inhaler 11/20/2022 Self No Yes    Inhale 1 puff Daily.    vitamin B-12 (CYANOCOBALAMIN) 2500 MCG sublingual tablet tablet 11/20/2022 Self Yes Yes    Take 2,500 mcg by mouth Every Evening.    benzonatate (TESSALON) 200 MG capsule  Self No No    Take 1 capsule by mouth 3 (Three) Times a Day As Needed for Cough.    Patient taking differently:  Take 200 mg by mouth 3 (Three) Times a Day As Needed for Cough. Has not started    budesonide (PULMICORT) 180 MCG/ACT inhaler  Self No No    Inhale 2 puffs 2 (Two) Times a Day.    Patient taking differently:  Inhale 2 puffs 2 (Two) Times a Day. Has not started    Mirabegron ER (Myrbetriq) 25 MG  tablet sustained-release 24 hour 24 hr tablet More than a month Self No No    Take 1 tablet by mouth Daily.            Medication notes:     This medication list is complete to the best of my knowledge as of 11/21/2022    Please call if questions.    Jann Villeda  Medication History Technician  [phone]    11/21/2022 13:34 EST

## 2022-11-21 NOTE — OUTREACH NOTE
Call Center TCM Note    Flowsheet Row Responses   [facility] patient discharged from? [place]   Does the patient have one of the following disease processes/diagnoses(primary or secondary)? COPD   TCM attempt successful? No   Unsuccessful attempts Attempt 2   Change in Health Status Readmitted          Mera Fairchild RN    11/21/2022, 13:08 EST

## 2022-11-21 NOTE — ED NOTES
This tech and ANUSHA Mendoza attempted to ambulate patient with walker. Patient was screaming and was not able to rotate to sit on the side of the bed. MD notified.

## 2022-11-21 NOTE — ED PROVIDER NOTES
EMERGENCY DEPARTMENT ENCOUNTER    Room Number:  40/40  Date of encounter:  11/21/2022  PCP: Erich Aviles MD  Historian: Patient      HPI:  Chief Complaint: Hip pain  A complete HPI/ROS/PMH/PSH/SH/FH are unobtainable due to: None    Context: Tony Leonard is a 82 y.o. male who presents to the ED c/o hip pain.  He complains of having bilateral hip pain has been going on for months.  However, it worsened today.  He was unable to get of bed.  He woke up around 5 AM and tried laying in bed for about an hour and was unable to get up.  Pain is severe.  Pain is worse with movement.  No fever.  No preceding trauma.      PAST MEDICAL HISTORY  Active Ambulatory Problems     Diagnosis Date Noted   • Thrush, oral 03/11/2016   • ADD (attention deficit disorder) 03/11/2016   • Hemorrhoids 03/11/2016   • Bronchiectasis with acute lower respiratory infection ([facility]) 03/11/2016   • Diverticul disease small and large intestine, no perforati or abscess 03/11/2016   • Benign non-nodular prostatic hyperplasia without lower urinary tract symptoms 03/11/2016   • Gastroesophageal reflux disease 03/11/2016   • Malaise and fatigue 03/11/2016   • Environmental allergies 04/25/2016   • Hemoptysis 04/27/2016   • Dyslipidemia 05/11/2016   • Primary osteoarthritis involving multiple joints 05/11/2016   • Pneumonia of right lower lobe due to infectious organism 10/03/2016   • Abnormal EKG 10/04/2016   • COPD (chronic obstructive pulmonary disease) ([facility]) 10/04/2016   • Mild malnutrition ([facility]) 03/28/2017   • Acute on chronic respiratory failure with hypoxia ([facility]) 04/27/2017   • Fracture, intertrochanteric, right femur, closed, initial encounter ([facility]) 01/16/2018   • Chronic diastolic CHF (congestive heart failure) ([facility]) 01/16/2018   • Hematoma of frontal scalp 01/16/2018   • Postoperative anemia due to acute blood loss 01/19/2018   • Urinary retention 01/25/2018   • Hemorrhagic disorder due to circulating anticoagulants ([facility]) 01/29/2018   •  History of fracture of right hip 02/22/2018   • Closed fracture of right hip with routine healing 02/28/2018   • Chronic low back pain with sciatica 06/21/2018   • Change in bowel function 04/18/2019   • Rectal bleeding 04/18/2019   • Prostate cancer ([facility]) 04/22/2019   • On home oxygen therapy 09/24/2019   • Acute viral bronchitis 12/29/2019   • Chronic diastolic (congestive) heart failure ([facility]) 10/14/2020   • Peripheral neuropathy 07/01/2021   • Plantar fasciitis 09/08/2021   • COPD exacerbation ([facility]) 05/07/2022   • Bilateral lower extremity edema 05/07/2022   • Microscopic hematuria 05/08/2022   • Acute midline low back pain with right-sided sciatica 08/01/2022   • Spinal stenosis, lumbar region with neurogenic claudication 08/02/2022   • Compression deformity of vertebra 08/02/2022   • Rectal bleed 09/23/2022   • Esophagitis 09/24/2022   • Angiectasia 09/27/2022   • Hiatal hernia 09/27/2022   • Acute exacerbation of chronic obstructive pulmonary disease (COPD) ([facility]) 11/13/2022   • Overweight (BMI 25.0-29.9) 11/14/2022   • Leukocytosis 11/15/2022     Resolved Ambulatory Problems     Diagnosis Date Noted   • Pneumonia of both lower lobes due to infectious organism 03/11/2016   • Pneumonia of right upper lobe due to infectious organism 04/22/2016   • COPD exacerbation ([facility]) 04/25/2016   • GERD (gastroesophageal reflux disease) 04/25/2016   • Chronic respiratory failure with hypoxia ([facility]) 10/04/2016   • Bacterial lobar pneumonia 12/27/2016   • Pneumonia of left lower lobe due to infectious organism 03/26/2017   • Bronchitis 06/01/2017   • COPD exacerbation ([facility]) 06/17/2017   • Leukocytosis 01/16/2018   • Right upper lobe pneumonia 01/20/2018   • Mucus plugging of bronchi 01/16/2018   • COPD exacerbation ([facility]) 04/16/2018   • Degenerative joint disease (DJD) of hip 09/23/2019   • Bronchiectasis with (acute) exacerbation ([facility]) 12/28/2019   • Primary hypertension 05/06/2022     Past Medical History:   Diagnosis Date   •  ADHD (attention deficit hyperactivity disorder)    • Bronchiectasis (HCC)    • Colon polyp    • Diverticulosis    • Hard of hearing    • Pneumonia          PAST SURGICAL HISTORY  Past Surgical History:   Procedure Laterality Date   • BRONCHOSCOPY N/A 4/30/2016    Procedure: BRONCHOSCOPY with BAL of right lower lobe and left  lower lobe.  ;  Surgeon: Nando Diaz MD;  Location: [facility] ENDOSCOPY;  Service:    • BRONCHOSCOPY N/A 4/28/2017    Procedure: BRONCHOSCOPY;  Surgeon: Katharina Salter MD;  Location: [facility] ENDOSCOPY;  Service:    • BRONCHOSCOPY Bilateral 6/29/2017    Procedure: BRONCHOSCOPY WITH WASHINGS;  Surgeon: Katharina Salter MD;  Location: [facility] ENDOSCOPY;  Service:    • BRONCHOSCOPY N/A 1/22/2018    Procedure: BRONCHOSCOPY AT BEDSIDE with BAL;  Surgeon: Katharina Salter MD;  Location: [facility] ENDOSCOPY;  Service:    • BRONCHOSCOPY N/A 1/30/2018    Procedure: BRONCHOSCOPY with BAL and washing;  Surgeon: Shreyas Wild MD;  Location: [facility] ENDOSCOPY;  Service:    • BRONCHOSCOPY Bilateral 4/24/2018    Procedure: BRONCHOSCOPY WITH WASHING;  Surgeon: Katharina Salter MD;  Location: [facility] ENDOSCOPY;  Service: Pulmonary   • BRONCHOSCOPY N/A 12/28/2019    Procedure: BRONCHOSCOPY with bilateral washing;  Surgeon: Katharina Salter MD;  Location: [facility] ENDOSCOPY;  Service: Pulmonary   • COLONOSCOPY  05/17/2013    eh, ih, tort, sig tics   • COLONOSCOPY N/A 5/10/2019    Non-thrombosed external hemorrhoids found on perianal exam, Diverticulosis, Tortuous colon, One 5 mm polyp in the mid ascending colon, IH. Path: Tubular adenoma.    • COLONOSCOPY N/A 9/24/2022    Procedure: COLONOSCOPY to cecum and TI with argon plasma coagulation.;  Surgeon: Bruce Black MD;  Location: [facility] ENDOSCOPY;  Service: Gastroenterology;  Laterality: N/A;  pre- GI bleeding  post- radiation proctitis, diverticulosis   • ENDOSCOPY  03/19/2015    z line irreg, hh   • ENDOSCOPY N/A 9/24/2022    Procedure: ESOPHAGOGASTRODUODENOSCOPY;   Surgeon: Bruce Black MD;  Location: [facility] ENDOSCOPY;  Service: Gastroenterology;  Laterality: N/A;  pre- GI bleeding  post- esophagitis, hiatal hernia   • HIP OPEN REDUCTION Right 1/17/2018    Procedure: HIP OPEN REDUCTION INTERNAL FIXATION WITH DYNAMIC HIP SCREW;  Surgeon: Les Black MD;  Location: [facility] OR;  Service:    • SPINE SURGERY     • TONSILLECTOMY     • TOTAL HIP ARTHROPLASTY REVISION Right 9/23/2019    Procedure: HIP  REVISION RIGHT;  Surgeon: Isauro Warner MD;  Location: [facility] OR;  Service: Orthopedics         FAMILY HISTORY  Family History   Problem Relation Age of Onset   • Thyroid disease Mother    • No Known Problems Father    • Malig Hyperthermia Neg Hx          SOCIAL HISTORY  Social History     Socioeconomic History   • Marital status: Single   Tobacco Use   • Smoking status: Never   • Smokeless tobacco: Never   Vaping Use   • Vaping Use: Never used   Substance and Sexual Activity   • Alcohol use: No     Comment: red wine occassional   • Drug use: No   • Sexual activity: Defer         ALLERGIES  Daliresp [roflumilast], Latex, and Sulfa antibiotics        REVIEW OF SYSTEMS  Review of Systems     All systems reviewed and negative except for those discussed in HPI.       PHYSICAL EXAM    I have reviewed the triage vital signs and nursing notes.    ED Triage Vitals [11/21/22 0802]   Temp Heart Rate Resp BP SpO2   97.9 °F (36.6 °C) 65 18 129/79 94 %      Temp src Heart Rate Source Patient Position BP Location FiO2 (%)   Tympanic Monitor -- -- --       Physical Exam  GENERAL: not distressed  HENT: nares patent  EYES: no scleral icterus  CV: regular rhythm, regular rate  RESPIRATORY: normal effort  ABDOMEN: soft, nontender  MUSCULOSKELETAL: no deformity, mild pain with passive range of motion to the bilateral hips, no T or L-spine tenderness  NEURO: alert, moves all extremities with 5/5 strength in bilateral arms and 4/5 strength to the legs involving the hip joints.  He has  5/5 strength to knee flexion and extension as well as to dorsiflexion plantarflexion.  Sensation intact to light touch, follows commands, 1+ patellar reflexes bilaterally  SKIN: warm, dry        LAB RESULTS  Recent Results (from the past 24 hour(s))   Basic Metabolic Panel    Collection Time: 11/21/22  9:02 AM    Specimen: Blood   Result Value Ref Range    Glucose 87 65 - 99 mg/dL    BUN 34 (H) 8 - 23 mg/dL    Creatinine 1.00 0.76 - 1.27 mg/dL    Sodium 142 136 - 145 mmol/L    Potassium 4.7 3.5 - 5.2 mmol/L    Chloride 103 98 - 107 mmol/L    CO2 29.0 22.0 - 29.0 mmol/L    Calcium 8.5 (L) 8.6 - 10.5 mg/dL    BUN/Creatinine Ratio 34.0 (H) 7.0 - 25.0    Anion Gap 10.0 5.0 - 15.0 mmol/L    eGFR 74.2 >60.0 mL/min/1.73   CBC Auto Differential    Collection Time: 11/21/22  9:02 AM    Specimen: Blood   Result Value Ref Range    WBC 11.16 (H) 3.40 - 10.80 10*3/mm3    RBC 4.18 4.14 - 5.80 10*6/mm3    Hemoglobin 13.7 13.0 - 17.7 g/dL    Hematocrit 41.3 37.5 - 51.0 %    MCV 98.8 (H) 79.0 - 97.0 fL    MCH 32.8 26.6 - 33.0 pg    MCHC 33.2 31.5 - 35.7 g/dL    RDW 12.0 (L) 12.3 - 15.4 %    RDW-SD 44.0 37.0 - 54.0 fl    MPV 9.6 6.0 - 12.0 fL    Platelets 217 140 - 450 10*3/mm3    Neutrophil % 64.4 42.7 - 76.0 %    Lymphocyte % 20.0 19.6 - 45.3 %    Monocyte % 10.0 5.0 - 12.0 %    Eosinophil % 4.9 0.3 - 6.2 %    Basophil % 0.1 0.0 - 1.5 %    Immature Grans % 0.6 (H) 0.0 - 0.5 %    Neutrophils, Absolute 7.18 (H) 1.70 - 7.00 10*3/mm3    Lymphocytes, Absolute 2.23 0.70 - 3.10 10*3/mm3    Monocytes, Absolute 1.12 (H) 0.10 - 0.90 10*3/mm3    Eosinophils, Absolute 0.55 (H) 0.00 - 0.40 10*3/mm3    Basophils, Absolute 0.01 0.00 - 0.20 10*3/mm3    Immature Grans, Absolute 0.07 (H) 0.00 - 0.05 10*3/mm3    nRBC 0.0 0.0 - 0.2 /100 WBC       Ordered the above labs and independently reviewed the results.        RADIOLOGY  XR Hips Bilateral With or Without Pelvis 5 View, XR Spine Lumbar Complete 4+VW    Result Date: 11/21/2022  XR HIPS BILATERAL W  OR WO PELVIS 5 VIEW-, XR SPINE LUMBAR COMPLETE 4+VW-  INDICATIONS: Pain  TECHNIQUE: 6 views of the lumbar spine, 7 views including the pelvis and bilateral hips  COMPARISON: 08/04/2022  FINDINGS:  No acute fracture is identified.  Chronic L1 compression deformity is noted. Vertebral body heights appear stable. Thoracolumbar levoscoliosis is present. Mild retrolisthesis of L1 on L2, L2 on L3. Mild anterolisthesis of L3 on L4, L4 on L5. Multilevel endplate spurring, disc space narrowing, facet arthropathy are present. Arterial calcification is seen.  The pelvic ring appears grossly intact. Right hip arthroplasty hardware appears intact. No hip dislocation. Left hip joint space appears preserved.       No acute fracture is identified in the lumbar spine, pelvis, or either hip. Chronic, postsurgical, and degenerative changes. Follow-up/further evaluation can be obtained as indications persist.  This report was finalized on 11/21/2022 9:59 AM by Dr. Tj Evans M.D.        I ordered the above noted radiological studies. Reviewed by me and discussed with radiologist.  See dictation for official radiology interpretation.      PROCEDURES    Procedures      MEDICATIONS GIVEN IN ER    Medications   sodium chloride 0.9 % flush 10 mL (has no administration in time range)   HYDROmorphone (DILAUDID) injection 0.5 mg (0.5 mg Intravenous Given 11/21/22 0903)   ondansetron (ZOFRAN) injection 4 mg (4 mg Intravenous Given 11/21/22 0905)         PROGRESS, DATA ANALYSIS, CONSULTS, AND MEDICAL DECISION MAKING    All labs have been independently reviewed by me.  All radiology studies have been reviewed by me and discussed with radiologist dictating the report.   EKG's independently viewed and interpreted by me.  Discussion below represents my analysis of pertinent findings related to patient's condition, differential diagnosis, treatment plan and final disposition.    Differential diagnosis includes septic joint, arthritic change,  fracture, dislocation, sciatica, spinal cord compression.    ED Course as of 11/21/22 1246   Mon Nov 21, 2022   1014 Hemoglobin: 13.7 [TD]   1014 Creatinine: 1.00 [TD]   1014 Sodium: 142 [TD]   1014 Potassium: 4.7 [TD]   1014 X-ray of the lumbar spine and pelvis and bilateral hips shows no acute fracture.  Degenerative changes noted. [TD]   1246 I discussed the case with Dr. Tomlinson, hospitalist.  Reviewed patient's imaging and exam.  He will admit. [TD]      ED Course User Index  [TD] Jaime Seth II, MD           PPE: The patient wore a surgical mask throughout the entire patient encounter. I wore an N95.    AS OF 12:46 EST VITALS:    BP - 150/71  HR - 80  TEMP - 97.9 °F (36.6 °C) (Tympanic)  O2 SATS - 97%        DIAGNOSIS  Final diagnoses:   Bilateral hip pain   Unable to ambulate         DISPOSITION  Admit           Jaime Seth II, MD  11/21/22 1243

## 2022-11-21 NOTE — OUTREACH NOTE
Call Center TCM Note    Flowsheet Row Responses   [facility] patient discharged from? [place]   Does the patient have one of the following disease processes/diagnoses(primary or secondary)? COPD   TCM attempt successful? No   Unsuccessful attempts Attempt 1  [Currently in ED]          Mera Fairchild RN    11/21/2022, 09:06 EST

## 2022-11-21 NOTE — H&P
Patient Name:  Tony Leonard  YOB: 1940  MRN:  [id number]  Admit Date:  11/21/2022  Patient Care Team:  Erich Aviles MD as PCP - General (Internal Medicine)  Katharina Salter MD as Consulting Physician (Pulmonary Disease)  Anum Bhatt MD as Consulting Physician (Pain Medicine)      Subjective   History Present Illness     Chief Complaint   Patient presents with   • Hip Pain   • Leg Pain       82-year-old man with a past medical history of right hip arthroplasty, COPD, with recent exacerbation that required admission, discharged on 11/19 (2 days prior to presentation to the hospital) came to the hospital today with bilateral hip pain.  Reported that he woke up at 5 AM to try to use the restroom.  But whenever he sat up he experienced very sharp, severe hip pain that radiated down the anterior lower extremities, bilaterally.  He reports that this kind of thing is never happened to him before. The pain is not present at rest any is able to move both legs without eliciting pain.  It is only when he raises his torso that he experiences the pain.   Staff in the emergency department try to ambulate the patient with a walker however he was screaming and not able to rotate or sitting the side of the bed.  Denies any trauma.      Review of Systems   Constitutional: Negative for chills and fever.   HENT: Negative for sore throat and trouble swallowing.    Respiratory: Negative for chest tightness and shortness of breath.    Cardiovascular: Negative for chest pain and palpitations.   Gastrointestinal: Negative for abdominal pain and blood in stool.   Genitourinary: Negative for difficulty urinating and hematuria.   Musculoskeletal: Positive for arthralgias and back pain.   Skin: Negative for pallor and rash.   Neurological: Negative for dizziness, light-headedness and numbness.        Personal History     Past Medical History:   Diagnosis Date   • ADHD (attention deficit hyperactivity disorder)     • Bronchiectasis (HCC)    • Colon polyp    • COPD (chronic obstructive pulmonary disease) (HCC)    • Diverticulosis    • Hard of hearing    • On home oxygen therapy     2 LITERS   • Pneumonia    • Prostate cancer (HCC) 04/22/2019   • Spinal stenosis, lumbar region with neurogenic claudication 08/02/2022     Past Surgical History:   Procedure Laterality Date   • BRONCHOSCOPY N/A 4/30/2016    Procedure: BRONCHOSCOPY with BAL of right lower lobe and left  lower lobe.  ;  Surgeon: Nando Diaz MD;  Location: [facility] ENDOSCOPY;  Service:    • BRONCHOSCOPY N/A 4/28/2017    Procedure: BRONCHOSCOPY;  Surgeon: Katharina Salter MD;  Location: [facility] ENDOSCOPY;  Service:    • BRONCHOSCOPY Bilateral 6/29/2017    Procedure: BRONCHOSCOPY WITH WASHINGS;  Surgeon: Katharina Salter MD;  Location: [facility] ENDOSCOPY;  Service:    • BRONCHOSCOPY N/A 1/22/2018    Procedure: BRONCHOSCOPY AT BEDSIDE with BAL;  Surgeon: Katharina Salter MD;  Location: [facility] ENDOSCOPY;  Service:    • BRONCHOSCOPY N/A 1/30/2018    Procedure: BRONCHOSCOPY with BAL and washing;  Surgeon: Shreyas Wild MD;  Location: [facility] ENDOSCOPY;  Service:    • BRONCHOSCOPY Bilateral 4/24/2018    Procedure: BRONCHOSCOPY WITH WASHING;  Surgeon: Katharina Salter MD;  Location: [facility] ENDOSCOPY;  Service: Pulmonary   • BRONCHOSCOPY N/A 12/28/2019    Procedure: BRONCHOSCOPY with bilateral washing;  Surgeon: Katharina Salter MD;  Location: [facility] ENDOSCOPY;  Service: Pulmonary   • COLONOSCOPY  05/17/2013    eh, ih, tort, sig tics   • COLONOSCOPY N/A 5/10/2019    Non-thrombosed external hemorrhoids found on perianal exam, Diverticulosis, Tortuous colon, One 5 mm polyp in the mid ascending colon, IH. Path: Tubular adenoma.    • COLONOSCOPY N/A 9/24/2022    Procedure: COLONOSCOPY to cecum and TI with argon plasma coagulation.;  Surgeon: Bruce Black MD;  Location: [facility] ENDOSCOPY;  Service: Gastroenterology;  Laterality: N/A;  pre- GI bleeding  post- radiation proctitis,  diverticulosis   • ENDOSCOPY  03/19/2015    z line irreg, hh   • ENDOSCOPY N/A 9/24/2022    Procedure: ESOPHAGOGASTRODUODENOSCOPY;  Surgeon: Bruce Black MD;  Location: [facility] ENDOSCOPY;  Service: Gastroenterology;  Laterality: N/A;  pre- GI bleeding  post- esophagitis, hiatal hernia   • HIP OPEN REDUCTION Right 1/17/2018    Procedure: HIP OPEN REDUCTION INTERNAL FIXATION WITH DYNAMIC HIP SCREW;  Surgeon: Les Black MD;  Location: [facility] MAIN OR;  Service:    • SPINE SURGERY     • TONSILLECTOMY     • TOTAL HIP ARTHROPLASTY REVISION Right 9/23/2019    Procedure: HIP  REVISION RIGHT;  Surgeon: Isauro Warner MD;  Location: [facility] MAIN OR;  Service: Orthopedics     Family History   Problem Relation Age of Onset   • Thyroid disease Mother    • No Known Problems Father    • Malig Hyperthermia Neg Hx      Social History     Tobacco Use   • Smoking status: Never   • Smokeless tobacco: Never   Vaping Use   • Vaping Use: Never used   Substance Use Topics   • Alcohol use: No     Comment: red wine occassional   • Drug use: No     No current facility-administered medications on file prior to encounter.     Current Outpatient Medications on File Prior to Encounter   Medication Sig Dispense Refill   • acetaminophen (TYLENOL) 500 MG tablet Take 500 mg by mouth Every 6 (Six) Hours As Needed for Mild Pain.     • albuterol (PROVENTIL) (2.5 MG/3ML) 0.083% nebulizer solution Take 2.5 mg by nebulization Every 6 (Six) Hours As Needed for Wheezing. 360 mL 3   • benzonatate (TESSALON) 200 MG capsule Take 1 capsule by mouth 3 (Three) Times a Day As Needed for Cough. 30 capsule 1   • budesonide (PULMICORT) 180 MCG/ACT inhaler Inhale 2 puffs 2 (Two) Times a Day. 1 each 3   • calcium carbonate (TUMS) 500 MG chewable tablet Chew 1 tablet As Needed for Indigestion or Heartburn.     • Camphor-Menthol-Methyl Sal (Salonpas) 3.1-6-10 % patch Apply 3 % topically Every 12 (Twelve) Hours.     • colestipol (COLESTID) 5 g granules Take 5 g by  mouth 2 (Two) Times a Day. Prn after diarrhea 500 g 3   • FLUoxetine (PROzac) 20 MG capsule TAKE 1 CAPSULE DAILY 90 capsule 3   • furosemide (LASIX) 40 MG tablet Take 1 tablet by mouth Daily. 30 tablet 3   • gabapentin (NEURONTIN) 300 MG capsule Take 1 capsule by mouth 3 (Three) Times a Day. 9 capsule 0   • guaifenesin (ROBITUSSIN) 100 MG/5ML liquid Take 10 mL by mouth Every 4 (Four) Hours As Needed for Cough. 118 mL 1   • methylphenidate (Ritalin) 10 MG tablet Take 1 tablet by mouth Daily. ADD 30 tablet 0   • Mirabegron ER (Myrbetriq) 25 MG tablet sustained-release 24 hour 24 hr tablet Take 1 tablet by mouth Daily. 30 tablet 1   • Misc. Devices (Rollator Ultra-Light) misc 1 application Daily. Severe DJD 1 each 0   • Multiple Vitamins-Minerals (MULTIVITAMIN ADULT PO) Take 1 tablet by mouth Daily.     • OXYGEN-HELIUM IN Inhale 2 L Daily As Needed.     • pantoprazole (PROTONIX) 40 MG EC tablet Take 1 tablet by mouth Daily. (Patient taking differently: Take 1 tablet by mouth 2 (Two) Times a Day.) 90 tablet 3   • polyethylene glycol 17 g packet DISSOLVE ONE PACKET IN 8OZ LIQUID & DRINK BY MOUTH DAILY 28 each 10   • potassium chloride (K-DUR,KLOR-CON) 20 MEQ CR tablet Take 1 tablet by mouth Daily.     • rOPINIRole (REQUIP) 0.5 MG tablet Take 1 tablet by mouth Every Night. Take 1 hour before bedtime. 90 tablet 1   • sodium chloride 7 % nebulizer solution nebulizer solution      • sucralfate (Carafate) 1 GM/10ML suspension Take 10 mL by mouth 4 (Four) Times a Day. 1200 mL 0   • terbinafine (lamiSIL) 250 MG tablet      • TiZANidine (ZANAFLEX) 6 MG capsule TAKE 1 CAPSULE EVERY NIGHT 90 capsule 3   • Turmeric 500 MG capsule Take  by mouth Daily.     • Umeclidinium-Vilanterol (ANORO ELLIPTA) 62.5-25 MCG/ACT aerosol powder  inhaler Inhale 1 puff Daily. 180 each 3   • vitamin B-12 (CYANOCOBALAMIN) 2500 MCG sublingual tablet tablet Take 2,500 mcg by mouth Every Evening.       Allergies   Allergen Reactions   • Daliresp  [Roflumilast] Anaphylaxis   • Latex Rash   • Sulfa Antibiotics Rash       Objective    Objective     Vital Signs  Temp:  [97.9 °F (36.6 °C)] 97.9 °F (36.6 °C)  Heart Rate:  [64-80] 80  Resp:  [18] 18  BP: (107-150)/(50-79) 150/71  SpO2:  [93 %-97 %] 97 %  on   ;   Device (Oxygen Therapy): room air  There is no height or weight on file to calculate BMI.    Physical Exam  Constitutional:       Appearance: Normal appearance.   HENT:      Head: Normocephalic and atraumatic.      Mouth/Throat:      Mouth: Mucous membranes are moist.      Pharynx: Oropharynx is clear.   Eyes:      Extraocular Movements: Extraocular movements intact.      Pupils: Pupils are equal, round, and reactive to light.   Cardiovascular:      Rate and Rhythm: Normal rate and regular rhythm.   Pulmonary:      Effort: Pulmonary effort is normal. No respiratory distress.   Abdominal:      General: There is no distension.      Palpations: Abdomen is soft.      Tenderness: There is no abdominal tenderness.   Musculoskeletal:      Right lower leg: No edema.      Left lower leg: No edema.      Comments: Tenderness to palpation at hips, bilaterally   Skin:     Findings: No lesion or rash.   Neurological:      Mental Status: He is alert and oriented to person, place, and time.      Cranial Nerves: No cranial nerve deficit.         Results Review:  I reviewed the patient's new clinical results.  I reviewed the patient's new imaging results and agree with the interpretation.  I reviewed the patient's other test results and agree with the interpretation  I personally viewed and interpreted the patient's EKG/Telemetry data  Discussed with ED provider.    Lab Results (last 24 hours)     Procedure Component Value Units Date/Time    CBC & Differential [[id number]]  (Abnormal) Collected: 11/21/22 0902    Specimen: Blood Updated: 11/21/22 0931    Narrative:      The following orders were created for panel order CBC & Differential.  Procedure                                Abnormality         Status                     ---------                               -----------         ------                     CBC Auto Differential[[id number]]        Abnormal            Final result                 Please view results for these tests on the individual orders.    Basic Metabolic Panel [[id number]]  (Abnormal) Collected: 11/21/22 0902    Specimen: Blood Updated: 11/21/22 0958     Glucose 87 mg/dL      BUN 34 mg/dL      Creatinine 1.00 mg/dL      Sodium 142 mmol/L      Potassium 4.7 mmol/L      Comment: Specimen hemolyzed.  Results may be affected.        Chloride 103 mmol/L      CO2 29.0 mmol/L      Calcium 8.5 mg/dL      BUN/Creatinine Ratio 34.0     Anion Gap 10.0 mmol/L      eGFR 74.2 mL/min/1.73      Comment: National Kidney Foundation and American Society of Nephrology (ASN) Task Force recommended calculation based on the Chronic Kidney Disease Epidemiology Collaboration (CKD-EPI) equation refit without adjustment for race.       Narrative:      GFR Normal >60  Chronic Kidney Disease <60  Kidney Failure <15    The GFR formula is only valid for adults with stable renal function between ages 18 and 70.    CBC Auto Differential [[id number]]  (Abnormal) Collected: 11/21/22 0902    Specimen: Blood Updated: 11/21/22 0931     WBC 11.16 10*3/mm3      RBC 4.18 10*6/mm3      Hemoglobin 13.7 g/dL      Hematocrit 41.3 %      MCV 98.8 fL      MCH 32.8 pg      MCHC 33.2 g/dL      RDW 12.0 %      RDW-SD 44.0 fl      MPV 9.6 fL      Platelets 217 10*3/mm3      Neutrophil % 64.4 %      Lymphocyte % 20.0 %      Monocyte % 10.0 %      Eosinophil % 4.9 %      Basophil % 0.1 %      Immature Grans % 0.6 %      Neutrophils, Absolute 7.18 10*3/mm3      Lymphocytes, Absolute 2.23 10*3/mm3      Monocytes, Absolute 1.12 10*3/mm3      Eosinophils, Absolute 0.55 10*3/mm3      Basophils, Absolute 0.01 10*3/mm3      Immature Grans, Absolute 0.07 10*3/mm3      nRBC 0.0 /100 WBC           Imaging Results (Last 24 Hours)      Procedure Component Value Units Date/Time    XR Hips Bilateral With or Without Pelvis 5 View [[id number]] Collected: 11/21/22 0954     Updated: 11/21/22 1002    Narrative:      XR HIPS BILATERAL W OR WO PELVIS 5 VIEW-, XR SPINE LUMBAR COMPLETE 4+VW-     INDICATIONS: Pain     TECHNIQUE: 6 views of the lumbar spine, 7 views including the pelvis and  bilateral hips     COMPARISON: 08/04/2022     FINDINGS:     No acute fracture is identified.      Chronic L1 compression deformity is noted. Vertebral body heights appear  stable. Thoracolumbar levoscoliosis is present. Mild retrolisthesis of  L1 on L2, L2 on L3. Mild anterolisthesis of L3 on L4, L4 on L5.  Multilevel endplate spurring, disc space narrowing, facet arthropathy  are present. Arterial calcification is seen.     The pelvic ring appears grossly intact. Right hip arthroplasty hardware  appears intact. No hip dislocation. Left hip joint space appears  preserved.       Impression:         No acute fracture is identified in the lumbar spine, pelvis, or either  hip. Chronic, postsurgical, and degenerative changes. Follow-up/further  evaluation can be obtained as indications persist.     This report was finalized on 11/21/2022 9:59 AM by Dr. Tj Evans M.D.       XR Spine Lumbar Complete 4+VW [[id number]] Collected: 11/21/22 0954     Updated: 11/21/22 1002    Narrative:      XR HIPS BILATERAL W OR WO PELVIS 5 VIEW-, XR SPINE LUMBAR COMPLETE 4+VW-     INDICATIONS: Pain     TECHNIQUE: 6 views of the lumbar spine, 7 views including the pelvis and  bilateral hips     COMPARISON: 08/04/2022     FINDINGS:     No acute fracture is identified.      Chronic L1 compression deformity is noted. Vertebral body heights appear  stable. Thoracolumbar levoscoliosis is present. Mild retrolisthesis of  L1 on L2, L2 on L3. Mild anterolisthesis of L3 on L4, L4 on L5.  Multilevel endplate spurring, disc space narrowing, facet arthropathy  are present. Arterial calcification is  seen.     The pelvic ring appears grossly intact. Right hip arthroplasty hardware  appears intact. No hip dislocation. Left hip joint space appears  preserved.       Impression:         No acute fracture is identified in the lumbar spine, pelvis, or either  hip. Chronic, postsurgical, and degenerative changes. Follow-up/further  evaluation can be obtained as indications persist.     This report was finalized on 11/21/2022 9:59 AM by Dr. Tj Evans M.D.             Results for orders placed during the hospital encounter of 06/28/17    Adult Transthoracic Echo Complete    Interpretation Summary  · Left ventricular systolic function is normal. Calculated EF = 56.4%.  · Left ventricular diastolic dysfunction is noted (grade I) consistent with impaired relaxation  · Mild aortic valve regurgitation is present.  · Mild mitral valve regurgitation is present  · There is no evidence of pericardial effusion.      No orders to display        Assessment/Plan     Active Hospital Problems    Diagnosis  POA   • **Bilateral hip pain [M25.551, M25.552]  Yes      Resolved Hospital Problems   No resolved problems to display.       82-year-old man with a past medical history of COPD presents to the hospital with bilateral hip pain upon  awakening this morning.    Bilateral hip pain  Plain films unremarkable   Check CT scans of the bilateral hips  Pain control  PT/OT    COPD  Resume home medicaiotns    Hypertension  GERD  Resume home medications     · I discussed the patient's findings and my recommendations with patient and nursing staff.    VTE Prophylaxis - SCDs.  Code Status - Full code.       Alberto Tomlinson MD  [facility]  11/21/22  13:24 EST

## 2022-11-21 NOTE — ED NOTES
Nursing report ED to floor  Tony Leonard  82 y.o.  male    HPI :   Chief Complaint   Patient presents with    Hip Pain    Leg Pain       Admitting doctor:   Alberto Tomlinson MD    Admitting diagnosis:   The primary encounter diagnosis was Bilateral hip pain. A diagnosis of Unable to ambulate was also pertinent to this visit.    Code status:   Current Code Status       Date Active Code Status Order ID Comments User Context       11/21/2022 1324 CPR (Attempt to Resuscitate) [id number]  Alberto Tomlinson MD ED        Question Answer    Code Status (Patient has no pulse and is not breathing) CPR (Attempt to Resuscitate)    Medical Interventions (Patient has pulse or is breathing) Full Support                    Allergies:   Daliresp [roflumilast], Latex, and Sulfa antibiotics    Isolation:   No active isolations    Intake and Output  No intake or output data in the 24 hours ending 11/21/22 1404    Weight:   There were no vitals filed for this visit.    Most recent vitals:   Vitals:    11/21/22 0947 11/21/22 0948 11/21/22 1133 11/21/22 1309   BP: 107/50  150/71    Pulse: 66 64 80 71   Resp:       Temp:       TempSrc:       SpO2: 94% 93% 97% 95%       Active LDAs/IV Access:   Lines, Drains & Airways       Active LDAs       Name Placement date Placement time Site Days    Peripheral IV 11/21/22 0903 Left;Posterior Hand 11/21/22  0903  Hand  less than 1                    Labs (abnormal labs have a star):   Labs Reviewed   BASIC METABOLIC PANEL - Abnormal; Notable for the following components:       Result Value    BUN 34 (*)     Calcium 8.5 (*)     BUN/Creatinine Ratio 34.0 (*)     All other components within normal limits    Narrative:     GFR Normal >60  Chronic Kidney Disease <60  Kidney Failure <15    The GFR formula is only valid for adults with stable renal function between ages 18 and 70.   CBC WITH AUTO DIFFERENTIAL - Abnormal; Notable for the following components:    WBC 11.16 (*)     MCV 98.8 (*)     RDW 12.0 (*)      Immature Grans % 0.6 (*)     Neutrophils, Absolute 7.18 (*)     Monocytes, Absolute 1.12 (*)     Eosinophils, Absolute 0.55 (*)     Immature Grans, Absolute 0.07 (*)     All other components within normal limits   CBC AND DIFFERENTIAL    Narrative:     The following orders were created for panel order CBC & Differential.  Procedure                               Abnormality         Status                     ---------                               -----------         ------                     CBC Auto Differential[[id number]]        Abnormal            Final result                 Please view results for these tests on the individual orders.       EKG:   No orders to display       Meds given in ED:   Medications   sodium chloride 0.9 % flush 10 mL (has no administration in time range)   sodium chloride 0.9 % flush 10 mL (10 mL Intravenous Given 11/21/22 1328)   sodium chloride 0.9 % flush 10 mL (has no administration in time range)   sodium chloride 0.9 % infusion 40 mL (has no administration in time range)   HYDROmorphone (DILAUDID) injection 0.5 mg (0.5 mg Intravenous Given 11/21/22 0903)   ondansetron (ZOFRAN) injection 4 mg (4 mg Intravenous Given 11/21/22 0905)       Imaging results:  XR Spine Lumbar Complete 4+VW    Result Date: 11/21/2022   No acute fracture is identified in the lumbar spine, pelvis, or either hip. Chronic, postsurgical, and degenerative changes. Follow-up/further evaluation can be obtained as indications persist.  This report was finalized on 11/21/2022 9:59 AM by Dr. Tj Evans M.D.      XR Hips Bilateral With or Without Pelvis 5 View    Result Date: 11/21/2022   No acute fracture is identified in the lumbar spine, pelvis, or either hip. Chronic, postsurgical, and degenerative changes. Follow-up/further evaluation can be obtained as indications persist.  This report was finalized on 11/21/2022 9:59 AM by Dr. Tj Evans M.D.       Ambulatory status:   - ax1    Social issues:    Social History     Socioeconomic History    Marital status: Single   Tobacco Use    Smoking status: Never    Smokeless tobacco: Never   Vaping Use    Vaping Use: Never used   Substance and Sexual Activity    Alcohol use: No     Comment: red wine occassional    Drug use: No    Sexual activity: Defer       NIH Stroke Scale:         Alaina Lord RN  11/21/22 14:04 EST

## 2022-11-21 NOTE — ED TRIAGE NOTES
Pt c/o bilat leg pain from hips radiating down legs.  Nki    Patient was placed in face mask during first look triage.  Patient was wearing a face mask throughout encounter.  I wore personal protective equipment throughout the encounter.  Hand hygiene was performed before and after patient encounter.

## 2022-11-22 PROCEDURE — 94799 UNLISTED PULMONARY SVC/PX: CPT

## 2022-11-22 PROCEDURE — 97162 PT EVAL MOD COMPLEX 30 MIN: CPT

## 2022-11-22 PROCEDURE — 94664 DEMO&/EVAL PT USE INHALER: CPT

## 2022-11-22 PROCEDURE — 97530 THERAPEUTIC ACTIVITIES: CPT

## 2022-11-22 PROCEDURE — G0378 HOSPITAL OBSERVATION PER HR: HCPCS

## 2022-11-22 PROCEDURE — 96372 THER/PROPH/DIAG INJ SC/IM: CPT

## 2022-11-22 PROCEDURE — 25010000002 ENOXAPARIN PER 10 MG: Performed by: STUDENT IN AN ORGANIZED HEALTH CARE EDUCATION/TRAINING PROGRAM

## 2022-11-22 RX ORDER — ENOXAPARIN SODIUM 100 MG/ML
40 INJECTION SUBCUTANEOUS EVERY 24 HOURS
Status: DISCONTINUED | OUTPATIENT
Start: 2022-11-22 | End: 2022-11-23 | Stop reason: HOSPADM

## 2022-11-22 RX ADMIN — BUDESONIDE 0.5 MG: 0.5 INHALANT ORAL at 22:43

## 2022-11-22 RX ADMIN — FLUOXETINE HYDROCHLORIDE 20 MG: 20 CAPSULE ORAL at 08:01

## 2022-11-22 RX ADMIN — HYDROCODONE BITARTRATE AND ACETAMINOPHEN 1 TABLET: 7.5; 325 TABLET ORAL at 05:59

## 2022-11-22 RX ADMIN — GABAPENTIN 300 MG: 300 CAPSULE ORAL at 13:45

## 2022-11-22 RX ADMIN — TIZANIDINE 6 MG: 4 TABLET ORAL at 22:59

## 2022-11-22 RX ADMIN — HYDROCODONE BITARTRATE AND ACETAMINOPHEN 1 TABLET: 7.5; 325 TABLET ORAL at 21:51

## 2022-11-22 RX ADMIN — MULTIPLE VITAMINS W/ MINERALS TAB 1 TABLET: TAB at 08:01

## 2022-11-22 RX ADMIN — HYDROCODONE BITARTRATE AND ACETAMINOPHEN 1 TABLET: 7.5; 325 TABLET ORAL at 13:45

## 2022-11-22 RX ADMIN — IPRATROPIUM BROMIDE AND ALBUTEROL SULFATE 3 ML: 2.5; .5 SOLUTION RESPIRATORY (INHALATION) at 07:42

## 2022-11-22 RX ADMIN — IPRATROPIUM BROMIDE AND ALBUTEROL SULFATE 3 ML: 2.5; .5 SOLUTION RESPIRATORY (INHALATION) at 22:39

## 2022-11-22 RX ADMIN — MIRABEGRON 25 MG: 25 TABLET, FILM COATED, EXTENDED RELEASE ORAL at 08:01

## 2022-11-22 RX ADMIN — GABAPENTIN 300 MG: 300 CAPSULE ORAL at 21:51

## 2022-11-22 RX ADMIN — ROPINIROLE HYDROCHLORIDE 0.5 MG: 0.5 TABLET, FILM COATED ORAL at 21:51

## 2022-11-22 RX ADMIN — GABAPENTIN 300 MG: 300 CAPSULE ORAL at 05:59

## 2022-11-22 RX ADMIN — FUROSEMIDE 40 MG: 40 TABLET ORAL at 08:01

## 2022-11-22 RX ADMIN — BUDESONIDE 0.5 MG: 0.5 INHALANT ORAL at 07:45

## 2022-11-22 RX ADMIN — IPRATROPIUM BROMIDE AND ALBUTEROL SULFATE 3 ML: 2.5; .5 SOLUTION RESPIRATORY (INHALATION) at 15:07

## 2022-11-22 RX ADMIN — ENOXAPARIN SODIUM 40 MG: 100 INJECTION SUBCUTANEOUS at 17:46

## 2022-11-22 RX ADMIN — PANTOPRAZOLE SODIUM 40 MG: 40 TABLET, DELAYED RELEASE ORAL at 05:59

## 2022-11-22 NOTE — CONSULTS
Met with patient, discussed benefits of pulmonary rehab. Provided phase II information along with the contact information for pulmonary rehab here at [facility]. Will call patient after discharge following FTE results.

## 2022-11-22 NOTE — DISCHARGE PLACEMENT REQUEST
"Tony Casey (82 y.o. Male)     Date of Birth   1940    Social Security Number       Address   [address] [place] [address]    Home Phone   [phone]    MRN   [id number]       Jehov[employer] witness   Buddhism    Marital Status   Single                            Admission Date   11/21/22    Admission Type   Emergency    Admitting Provider   Alberto Tomlinson MD    Attending Provider   Alberto Tomlinson MD    Department, Room/Bed   [facility] [address], P878/1       Discharge Date       Discharge Disposition       Discharge Destination                               Attending Provider: Alberto Tomlinson MD    Allergies: Daliresp [Roflumilast], Latex, Sulfa Antibiotics    Isolation: None   Infection: None   Code Status: CPR    Ht: 180.3 cm (71\")   Wt: 77.9 kg (171 lb 11.8 oz)    Admission Cmt: None   Principal Problem: Bilateral hip pain [M25.551,M25.552]                 Active Insurance as of 11/21/2022     Primary Coverage     Payor Plan Insurance Group Employer/Plan Group    MEDICARE MEDICARE A & B      Payor Plan Address Payor Plan Phone Number Payor Plan Fax Number Effective Dates    [address] [phone]  5/1/2003 - None Entered    [facility] [id number]       Subscriber Name Subscriber Birth Date Member ID       TONY CASEY 1940 [id number]           Secondary Coverage     Payor Plan Insurance Group Employer/Plan Group     FOR LIFE  FOR LIFE  SUP       Payor Plan Address Payor Plan Phone Number Payor Plan Fax Number Effective Dates    [address] [phone]  3/10/2016 - None Entered    [facility] [address]       Subscriber Name Subscriber Birth Date Member ID       TONY CASEY 1940 [id number]                 Emergency Contacts      (Rel.) Home Phone Work Phone Mobile Phone    Ricardo Casey (Son) [phone] -- [phone]    Frederick Casey (Son) [phone] -- --    VICKY CASEY (Son) [phone] -- [phone]              "

## 2022-11-22 NOTE — THERAPY EVALUATION
Patient Name: Tony Leonard  : 1940    MRN: [id number]                              Today's Date: 2022       Admit Date: 2022    Visit Dx:     ICD-10-CM ICD-9-CM   1. Bilateral hip pain  M25.551 719.45    M25.552    2. Unable to ambulate  R26.2 719.7     Patient Active Problem List   Diagnosis   • Thrush, oral   • ADD (attention deficit disorder)   • Hemorrhoids   • Bronchiectasis with acute lower respiratory infection ([facility])   • Diverticul disease small and large intestine, no perforati or abscess   • Benign non-nodular prostatic hyperplasia without lower urinary tract symptoms   • Gastroesophageal reflux disease   • Malaise and fatigue   • Environmental allergies   • Hemoptysis   • Dyslipidemia   • Primary osteoarthritis involving multiple joints   • Pneumonia of right lower lobe due to infectious organism   • Abnormal EKG   • COPD (chronic obstructive pulmonary disease) ([facility])   • Mild malnutrition ([facility])   • Acute on chronic respiratory failure with hypoxia ([facility])   • Fracture, intertrochanteric, right femur, closed, initial encounter ([facility])   • Chronic diastolic CHF (congestive heart failure) ([facility])   • Hematoma of frontal scalp   • Postoperative anemia due to acute blood loss   • Urinary retention   • Hemorrhagic disorder due to circulating anticoagulants ([facility])   • History of fracture of right hip   • Closed fracture of right hip with routine healing   • Chronic low back pain with sciatica   • Change in bowel function   • Rectal bleeding   • Prostate cancer ([facility])   • On home oxygen therapy   • Acute viral bronchitis   • Chronic diastolic (congestive) heart failure ([facility])   • Peripheral neuropathy   • Plantar fasciitis   • COPD exacerbation ([facility])   • Bilateral lower extremity edema   • Microscopic hematuria   • Acute midline low back pain with right-sided sciatica   • Spinal stenosis, lumbar region with neurogenic claudication   • Compression deformity of vertebra   • Rectal bleed   •  Esophagitis   • Angiectasia   • Hiatal hernia   • Acute exacerbation of chronic obstructive pulmonary disease (COPD) ([facility])   • Overweight (BMI 25.0-29.9)   • Leukocytosis   • Bilateral hip pain     Past Medical History:   Diagnosis Date   • ADHD (attention deficit hyperactivity disorder)    • Bronchiectasis ([facility])    • Colon polyp    • COPD (chronic obstructive pulmonary disease) ([facility])    • Diverticulosis    • Hard of hearing    • On home oxygen therapy     2 LITERS   • Pneumonia    • Prostate cancer ([facility]) 04/22/2019   • Spinal stenosis, lumbar region with neurogenic claudication 08/02/2022     Past Surgical History:   Procedure Laterality Date   • BRONCHOSCOPY N/A 4/30/2016    Procedure: BRONCHOSCOPY with BAL of right lower lobe and left  lower lobe.  ;  Surgeon: Nando Diaz MD;  Location: [facility] ENDOSCOPY;  Service:    • BRONCHOSCOPY N/A 4/28/2017    Procedure: BRONCHOSCOPY;  Surgeon: Katharina Salter MD;  Location: [facility] ENDOSCOPY;  Service:    • BRONCHOSCOPY Bilateral 6/29/2017    Procedure: BRONCHOSCOPY WITH WASHINGS;  Surgeon: Katharina Salter MD;  Location: [facility] ENDOSCOPY;  Service:    • BRONCHOSCOPY N/A 1/22/2018    Procedure: BRONCHOSCOPY AT BEDSIDE with BAL;  Surgeon: Kathairna Salter MD;  Location: [facility] ENDOSCOPY;  Service:    • BRONCHOSCOPY N/A 1/30/2018    Procedure: BRONCHOSCOPY with BAL and washing;  Surgeon: Shreyas Wild MD;  Location: [facility] ENDOSCOPY;  Service:    • BRONCHOSCOPY Bilateral 4/24/2018    Procedure: BRONCHOSCOPY WITH WASHING;  Surgeon: Katharina Salter MD;  Location: [facility] ENDOSCOPY;  Service: Pulmonary   • BRONCHOSCOPY N/A 12/28/2019    Procedure: BRONCHOSCOPY with bilateral washing;  Surgeon: Katharina Salter MD;  Location: [facility] ENDOSCOPY;  Service: Pulmonary   • COLONOSCOPY  05/17/2013    eh, ih, tort, sig tics   • COLONOSCOPY N/A 5/10/2019    Non-thrombosed external hemorrhoids found on perianal exam, Diverticulosis, Tortuous colon, One 5 mm polyp in the mid ascending colon,  IH. Path: Tubular adenoma.    • COLONOSCOPY N/A 9/24/2022    Procedure: COLONOSCOPY to cecum and TI with argon plasma coagulation.;  Surgeon: Bruce Black MD;  Location: [facility] ENDOSCOPY;  Service: Gastroenterology;  Laterality: N/A;  pre- GI bleeding  post- radiation proctitis, diverticulosis   • ENDOSCOPY  03/19/2015    z line irreg, hh   • ENDOSCOPY N/A 9/24/2022    Procedure: ESOPHAGOGASTRODUODENOSCOPY;  Surgeon: Bruce Black MD;  Location: [facility] ENDOSCOPY;  Service: Gastroenterology;  Laterality: N/A;  pre- GI bleeding  post- esophagitis, hiatal hernia   • HIP OPEN REDUCTION Right 1/17/2018    Procedure: HIP OPEN REDUCTION INTERNAL FIXATION WITH DYNAMIC HIP SCREW;  Surgeon: Les Black MD;  Location: [facility] MAIN OR;  Service:    • SPINE SURGERY     • TONSILLECTOMY     • TOTAL HIP ARTHROPLASTY REVISION Right 9/23/2019    Procedure: HIP  REVISION RIGHT;  Surgeon: Isauro Warner MD;  Location: [facility] OR;  Service: Orthopedics      General Information     Row Name 11/22/22 1038          Physical Therapy Time and Intention    Document Type evaluation  Pt. admitted with Bilateral Hip pain  -MS     Mode of Treatment physical therapy;individual therapy  -MS     Row Name 11/22/22 1038          General Information    Patient Profile Reviewed yes  -MS     Prior Level of Function independent:  Use of Rwx for ambulation at senior living apt.'s  -MS     Existing Precautions/Restrictions fall   Exit alarm  -MS     Barriers to Rehab none identified  -MS     Row Name 11/22/22 1038          Cognition    Orientation Status (Cognition) oriented x 3  -MS     Row Name 11/22/22 1038          Safety Issues, Functional Mobility    Comment, Safety Issues/Impairments (Mobility) Gait belt used for safety.  -MS           User Key  (r) = Recorded By, (t) = Taken By, (c) = Cosigned By    Initials Name Provider Type    MS Juan Hauser, PT Physical Therapist               Mobility     Row Name 11/22/22 1036           Bed Mobility    Bed Mobility supine-sit  -MS     Supine-Sit [place] (Bed Mobility) contact guard  -MS     Row Name 11/22/22 1039          Sit-Stand Transfer    Sit-Stand [place] (Transfers) contact guard  -MS     Assistive Device (Sit-Stand Transfers) walker, front-wheeled  -MS     Row Name 11/22/22 1039          Gait/Stairs (Locomotion)    [place] Level (Gait) contact guard  -MS     Assistive Device (Gait) walker, front-wheeled  -MS     Distance in Feet (Gait) 10 feet  -MS     Deviations/Abnormal Patterns (Gait) norm decreased  -MS     Bilateral Gait Deviations forward flexed posture  -MS     Comment, (Gait/Stairs) Verbal/tactile cues given for posture correction. Once standing, pt. began to urinate and had to use urinal while standing ~ 1-2 minutes.  -MS           User Key  (r) = Recorded By, (t) = Taken By, (c) = Cosigned By    Initials Name Provider Type    Juan Hernández, PT Physical Therapist               Obj/Interventions     Row Name 11/22/22 1040          Range of Motion Comprehensive    Comment, General Range of Motion BLE (WFL's)  -MS     Row Name 11/22/22 1040          Strength Comprehensive (MMT)    Comment, General Manual Muscle Testing (MMT) Assessment BLE (3/5)  -MS           User Key  (r) = Recorded By, (t) = Taken By, (c) = Cosigned By    Initials Name Provider Type    Juan Hernández, PT Physical Therapist               Goals/Plan     Row Name 11/22/22 1041          Bed Mobility Goal 1 (PT)    Activity/Assistive Device (Bed Mobility Goal 1, PT) bed mobility activities, all  -MS     [place] Level/Cues Needed (Bed Mobility Goal 1, PT) standby assist  -MS     Time Frame (Bed Mobility Goal 1, PT) long term goal (LTG);1 week  -MS     Row Name 11/22/22 1041          Transfer Goal 1 (PT)    Activity/Assistive Device (Transfer Goal 1, PT) transfers, all;walker, rolling  -MS     [place] Level/Cues Needed (Transfer Goal 1, PT) standby assist  -MS     Time Frame  (Transfer Goal 1, PT) long term goal (LTG);1 week  -MS     Row Name 11/22/22 1041          Gait Training Goal 1 (PT)    Activity/Assistive Device (Gait Training Goal 1, PT) gait (walking locomotion);walker, rolling  -MS     [place] Level (Gait Training Goal 1, PT) standby assist  -MS     Distance (Gait Training Goal 1, PT) 100 feet  -MS     Time Frame (Gait Training Goal 1, PT) long term goal (LTG);1 week  -MS     Row Name 11/22/22 1041          Therapy Assessment/Plan (PT)    Planned Therapy Interventions (PT) balance training;bed mobility training;gait training;home exercise program;patient/family education;postural re-education;transfer training;strengthening  -MS           User Key  (r) = Recorded By, (t) = Taken By, (c) = Cosigned By    Initials Name Provider Type    Juan Hernández, PT Physical Therapist               Clinical Impression     Row Name 11/22/22 1040          Pain    Pretreatment Pain Rating 2/10  -MS     Posttreatment Pain Rating 2/10  -MS     Pain Location - Side/Orientation Bilateral  -MS     Pain Location - hip  -MS     Row Name 11/22/22 1040          Plan of Care Review    Plan of Care Reviewed With patient  -MS     Row Name 11/22/22 1040          Therapy Assessment/Plan (PT)    Rehab Potential (PT) good, to achieve stated therapy goals  -MS     Criteria for Skilled Interventions Met (PT) skilled treatment is necessary  -MS     Therapy Frequency (PT) 6 times/wk  -MS     Row Name 11/22/22 1040          Positioning and Restraints    Pre-Treatment Position in bed  -MS     Post Treatment Position chair  -MS     In Chair notified nsg;reclined;sitting;call light within reach;encouraged to call for assist;exit alarm on  All lines intact.  -MS           User Key  (r) = Recorded By, (t) = Taken By, (c) = Cosigned By    Initials Name Provider Type    Juan Hernández, PT Physical Therapist               Outcome Measures     Row Name 11/22/22 1041 11/22/22 0810       How much help from  another person do you currently need...    Turning from your back to your side while in flat bed without using bedrails? 3  -MS 3  -BB    Moving from lying on back to sitting on the side of a flat bed without bedrails? 3  -MS 2  -BB    Moving to and from a bed to a chair (including a wheelchair)? 3  -MS 2  -BB    Standing up from a chair using your arms (e.g., wheelchair, bedside chair)? 3  -MS 2  -BB    Climbing 3-5 steps with a railing? 2  -MS 1  -BB    To walk in hospital room? 3  -MS 1  -BB    AM-PAC 6 Clicks Score (PT) 17  -MS 11  -BB    Highest level of mobility 5 --> Static standing  -MS 4 --> Transferred to chair/commode  -BB    Row Name 11/22/22 1041          Functional Assessment    Outcome Measure Options AM-PAC 6 Clicks Basic Mobility (PT)  -MS           User Key  (r) = Recorded By, (t) = Taken By, (c) = Cosigned By    Initials Name Provider Type    MS Juan Hauser, PT Physical Therapist    Keyana Chang RN Registered Nurse                             Physical Therapy Education     Title: PT OT SLP Therapies (Done)     Topic: Physical Therapy (Done)     Point: Mobility training (Done)     Learning Progress Summary           Patient Acceptance, E,D, VU,NR by MS at 11/22/2022 1042                   Point: Home exercise program (Done)     Learning Progress Summary           Patient Acceptance, E,D, VU,NR by MS at 11/22/2022 1042                   Point: Body mechanics (Done)     Learning Progress Summary           Patient Acceptance, E,D, VU,NR by MS at 11/22/2022 1042                   Point: Precautions (Done)     Learning Progress Summary           Patient Acceptance, E,D, VU,NR by MS at 11/22/2022 1042                               User Key     Initials Effective Dates Name Provider Type Discipline    MS 06/16/21 -  Juan Hauser, PT Physical Therapist PT              PT Recommendation and Plan  Planned Therapy Interventions (PT): balance training, bed mobility training, gait training,  home exercise program, patient/family education, postural re-education, transfer training, strengthening  Plan of Care Reviewed With: patient  Outcome Evaluation: Pt. is an 82 year old Male admitted to the hospital with bilateral hip pain. Pt. reports that prior to this pain he was independent at home and using a Rwx for ambulation.  Pt. currently presents with decreased strength, decreased balance, and decreased tolerance to functional activity.  This AM, pt. able to ambulate 10 feet, CGA x 1, with use of Rwx.  Pt. requires CGA x 1 for bed mobility and CGA x 1 for sit <-> stand transfers.  Verbal/tactile cues given during upright mobility for posture correction.  Pt. will benefit from skilled inpt. P.T. to address his functional deficits and to assist pt. in regaining his maximum level of independence with functional mobility.     Time Calculation:    PT Charges     Row Name 11/22/22 1046             Time Calculation    Start Time 1015  -MS      Stop Time 1033  -MS      Time Calculation (min) 18 min  -MS      PT Received On 11/22/22  -MS      PT - Next Appointment 11/23/22  -MS      PT Goal Re-Cert Due Date 11/29/22  -MS         Time Calculation- PT    Total Timed Code Minutes- PT 17 minute(s)  -MS            User Key  (r) = Recorded By, (t) = Taken By, (c) = Cosigned By    Initials Name Provider Type    Juan Hernández, PT Physical Therapist              Therapy Charges for Today     Code Description Service Date Service Provider Modifiers Qty    [id number]  PT EVAL MOD COMPLEXITY 2 11/22/2022 Juan Hauser, PT GP 1    [id number]  PT THERAPEUTIC ACT EA 15 MIN 11/22/2022 Juan Hauser, PT GP 1          PT G-Codes  Outcome Measure Options: AM-PAC 6 Clicks Basic Mobility (PT)  AM-PAC 6 Clicks Score (PT): 17  PT Discharge Summary  Anticipated Discharge Disposition (PT): home with assist, home with home health, skilled nursing facility (Pending pt. progress)    Juan Hauser, PT  11/22/2022

## 2022-11-22 NOTE — PLAN OF CARE
Goal Outcome Evaluation:  Plan of Care Reviewed With: patient           Outcome Evaluation: Pt. is an 82 year old Male admitted to the hospital with bilateral hip pain. Pt. reports that prior to this pain he was independent at home and using a Rwx for ambulation.  Pt. currently presents with decreased strength, decreased balance, and decreased tolerance to functional activity.  This AM, pt. able to ambulate 10 feet, CGA x 1, with use of Rwx.  Pt. requires CGA x 1 for bed mobility and CGA x 1 for sit <-> stand transfers.  Verbal/tactile cues given during upright mobility for posture correction.  Pt. will benefit from skilled inpt. P.T. to address his functional deficits and to assist pt. in regaining his maximum level of independence with functional mobility.    Patient was wearing a face mask during this therapy encounter. Therapist used appropriate personal protective equipment including eye protection, mask, and gloves.  Mask used was standard procedure mask. Appropriate PPE was worn during the entire therapy session. Hand hygiene was completed before and after therapy session. Patient is not in enhanced droplet precautions.

## 2022-11-22 NOTE — PLAN OF CARE
Goal Outcome Evaluation:  Plan of Care Reviewed With: patient        Progress: no change  Outcome Evaluation: Pt admitted to Ortho floor from ED due to c/o of severe 'burning' pain in bilateral hips that radiates to R leg.  VSS, skin intact, PRN Norco given 1x this shift, which pt states partially relieved the pain, pt unable to ambulate to bedside commode-bedpan used, pt had episode of incontinence once this shift, otherwise used urinal for voiding

## 2022-11-22 NOTE — PLAN OF CARE
Goal Outcome Evaluation:  Plan of Care Reviewed With: patient        Progress: no change  Outcome Evaluation: patient voiding per brief, PO medication given for pain, repositioning self in bed, educated on medication management

## 2022-11-22 NOTE — PROGRESS NOTES
Name: Toyn Leonard ADMIT: 2022   : 1940  PCP: Erich Aviles MD    MRN: [id number] LOS: 0 days   AGE/SEX: 82 y.o. male  ROOM: [facility]     Subjective   Subjective   Appears to be comfortable. No acute distress. Still has hip pain when flexing his torso    Review of Systems   Constitutional: Negative for chills and fever.   Respiratory: Negative for chest tightness and shortness of breath.    Cardiovascular: Negative for chest pain and palpitations.   Gastrointestinal: Negative for abdominal pain and constipation.        Objective   Objective   Vital Signs  Temp:  [97.5 °F (36.4 °C)-98.6 °F (37 °C)] 97.5 °F (36.4 °C)  Heart Rate:  [52-84] 81  Resp:  [16-18] 16  BP: (103-144)/(60-72) 120/72  SpO2:  [93 %-96 %] 95 %  on   ;   Device (Oxygen Therapy): room air  Body mass index is 23.95 kg/m².  Physical Exam  Constitutional:       General: He is not in acute distress.     Comments: Chronically ill appearing    HENT:      Head: Normocephalic and atraumatic.   Cardiovascular:      Rate and Rhythm: Normal rate and regular rhythm.   Pulmonary:      Effort: Pulmonary effort is normal. No respiratory distress.   Abdominal:      General: There is no distension.      Palpations: Abdomen is soft.      Tenderness: There is no abdominal tenderness.   Neurological:      Mental Status: He is alert and oriented to person, place, and time.         Results Review     I reviewed the patient's new clinical results.  Results from last 7 days   Lab Units 22  0902 22  0640 22  0545 22  0604   WBC 10*3/mm3 11.16* 8.58 8.39 9.88   HEMOGLOBIN g/dL 13.7 13.8 13.1 12.7*   PLATELETS 10*3/mm3 217 213 224 200     Results from last 7 days   Lab Units 22  0902 22  0640 22  0545 22  0604   SODIUM mmol/L 142 137 141 140   POTASSIUM mmol/L 4.7 4.1 4.1 4.0   CHLORIDE mmol/L 103 101 103 103   CO2 mmol/L 29.0 28.5 28.2 29.0   BUN mg/dL 34* 34* 32* 29*   CREATININE mg/dL 1.00 0.91 0.93  0.85   GLUCOSE mg/dL 87 92 94 97   Estimated Creatinine Clearance: 60.6 mL/min (by C-G formula based on SCr of 1 mg/dL).    Results from last 7 days   Lab Units 11/21/22  0902 11/19/22  0640 11/18/22  0545 11/17/22  0604   CALCIUM mg/dL 8.5* 8.6 9.1 8.6     Results from last 7 days   Lab Units 11/16/22  0813 11/16/22  0158 11/15/22  2259 11/15/22  1912   LACTATE mmol/L 2.0 2.1* 2.2* 3.6*     COVID19   Date Value Ref Range Status   11/13/2022 Not Detected Not Detected - Ref. Range Final   08/01/2022 Not Detected Not Detected - Ref. Range Final     Glucose   Date/Time Value Ref Range Status   11/19/2022 1614 137 (H) 70 - 130 mg/dL Final     Comment:     Meter: [id number] : [id number] [place] Debi NA       XR Hips Bilateral With or Without Pelvis 5 View, XR Spine Lumbar Complete 4+VW  Narrative: XR HIPS BILATERAL W OR WO PELVIS 5 VIEW-, XR SPINE LUMBAR COMPLETE 4+VW-     INDICATIONS: Pain     TECHNIQUE: 6 views of the lumbar spine, 7 views including the pelvis and  bilateral hips     COMPARISON: 08/04/2022     FINDINGS:     No acute fracture is identified.      Chronic L1 compression deformity is noted. Vertebral body heights appear  stable. Thoracolumbar levoscoliosis is present. Mild retrolisthesis of  L1 on L2, L2 on L3. Mild anterolisthesis of L3 on L4, L4 on L5.  Multilevel endplate spurring, disc space narrowing, facet arthropathy  are present. Arterial calcification is seen.     The pelvic ring appears grossly intact. Right hip arthroplasty hardware  appears intact. No hip dislocation. Left hip joint space appears  preserved.     Impression:    No acute fracture is identified in the lumbar spine, pelvis, or either  hip. Chronic, postsurgical, and degenerative changes. Follow-up/further  evaluation can be obtained as indications persist.     This report was finalized on 11/21/2022 9:59 AM by Dr. Tj Evans M.D.       Scheduled Medications  budesonide, 0.5 mg, Nebulization, BID - RT  FLUoxetine, 20  mg, Oral, Daily  furosemide, 40 mg, Oral, Daily  gabapentin, 300 mg, Oral, Q8H  ipratropium-albuterol, 3 mL, Nebulization, Q8H - RT  Mirabegron ER, 25 mg, Oral, Daily  multivitamin with minerals, 1 tablet, Oral, Daily  pantoprazole, 40 mg, Oral, QAM  rOPINIRole, 0.5 mg, Oral, Nightly  sodium chloride, 10 mL, Intravenous, Q12H    Infusions   Diet  Diet: Regular/House Diet; Texture: Regular Texture (IDDSI 7); Fluid Consistency: Thin (IDDSI 0)       Assessment/Plan     Active Hospital Problems    Diagnosis  POA   • **Bilateral hip pain [M25.551, M25.552]  Yes      Resolved Hospital Problems   No resolved problems to display.       82 y.o. male admitted with Bilateral hip pain.    82-year-old man with a past medical history of COPD presents to the hospital with bilateral hip pain upon  awakening this morning.     Bilateral hip pain  Has been occurring for 3 months  Plain films unremarkable   Check MRI of L-spine and pelvis   Pain control  PT/OT     COPD  Resume home medicaiotns     Hypertension  GERD  Resume home medications     · Lovenox 40 mg SC daily for DVT prophylaxis.  · Full code.  · Discussed with patient and nursing staff.  · Anticipate discharge to SNU facility in 1-2 days.      Alberto Tomlinson MD  [facility]  11/22/22  14:04 EST    Patient was wearing facemask when I entered the room and throughout our encounter.  I wore protective equipment throughout this patient encounter including a face mask, gloves and protective eyewear.  Hand hygiene was performed before donning protective equipment and after removal when leaving the room.

## 2022-11-22 NOTE — CASE MANAGEMENT/SOCIAL WORK
Discharge Planning Assessment  [facility]     Patient Name: Tony Leonard  MRN: [id number]  Today's Date: 11/22/2022    Admit Date: 11/21/2022    Plan: [facility] -- Referral Pending.   Discharge Needs Assessment     Row Name 11/22/22 1741       Living Environment    People in Home alone;facility resident    Unique Family Situation [address]    Current Living Arrangements home    Primary Care Provided by self    Provides Primary Care For no one    Family Caregiver if Needed child(brian), adult    Quality of Family Relationships helpful;involved;supportive       Resource/Environmental Concerns    Transportation Concerns none       Transition Planning    Patient/Family Anticipates Transition to inpatient rehabilitation facility    Patient/Family Anticipated Services at Transition     Transportation Anticipated family or friend will provide;health plan transportation       Discharge Needs Assessment    Readmission Within the Last 30 Days no previous admission in last 30 days    Equipment Currently Used at Home rollator    Discharge Facility/Level of Care Needs nursing facility, skilled    Provided Post Acute Provider List? N/A    N/A Provider List Comment Declined.    Patient's Choice of Community Agency(s) Requests [facility].               Discharge Plan     Row Name 11/22/22 1742       Plan    Plan [facility] -- Referral Pending.    Patient/Family in Agreement with Plan yes    Plan Comments Spoke with the patient, verified current information and explained the role of the CCP. Patient lives alone at the [address] He's IADL and owns a rollator. He has no history with [facility] and has worked with [facility]  in the past. Physical Therapy eval noted. Patient plans to d/c to SNF. He requests a referral to [facility]. Referral sent in [facility]. CCP will follow.              Continued Care and Services - Admitted Since 11/21/2022     Destination     Service Provider  Request Status Selected Services Address Phone Fax Patient Preferred    [facility] CARE Pending - Request Sent N/A [address] [facility] [address] [phone] --            Selected Continued Care - Prior Encounters Includes continued care and service providers with selected services from prior encounters from 8/23/2022 to 11/22/2022    Discharged on 11/19/2022 Admission date: 11/13/2022 - Discharge disposition: Home or Self Care    Home Medical Care     Service Provider Selected Services Address Phone Fax Patient Preferred    [facility] [address] [address] [phone] --                       Demographic Summary     Row Name 11/22/22 1740       General Information    Admission Type observation    Reason for Consult discharge planning    Preferred Language English       Contact Information    Permission Granted to Share Info With ;family/designee               Functional Status     Row Name 11/22/22 1740       Functional Status    Usual Activity Tolerance good       Functional Status, IADL    Medications independent    Meal Preparation assistive equipment    Housekeeping assistive equipment    Laundry assistive equipment    Shopping assistive equipment       Mental Status    General Appearance WDL WDL       Mental Status Summary    Recent Changes in Mental Status/Cognitive Functioning no changes               Psychosocial     Row Name 11/22/22 1741       Intellectual Performance WDL    Level of Consciousness Alert       Coping/Stress    Patient Personal Strengths able to adapt    Sources of Support adult child(brian)    Reaction to Health Status accepting    Understanding of Condition and Treatment adequate understanding of medical condition;adequate understanding of treatment       Developmental Stage (Katerineon's)    Developmental Stage Stage 8 (65 years-death/Late Adulthood) Integrity vs.  Eliud CAMPBELL RN

## 2022-11-23 VITALS
HEART RATE: 97 BPM | BODY MASS INDEX: 24.04 KG/M2 | HEIGHT: 71 IN | WEIGHT: 171.74 LBS | SYSTOLIC BLOOD PRESSURE: 119 MMHG | RESPIRATION RATE: 18 BRPM | OXYGEN SATURATION: 97 % | DIASTOLIC BLOOD PRESSURE: 74 MMHG | TEMPERATURE: 97.8 F

## 2022-11-23 LAB
ANION GAP SERPL CALCULATED.3IONS-SCNC: 5.5 MMOL/L (ref 5–15)
BUN SERPL-MCNC: 32 MG/DL (ref 8–23)
BUN/CREAT SERPL: 34.4 (ref 7–25)
CALCIUM SPEC-SCNC: 8.6 MG/DL (ref 8.6–10.5)
CHLORIDE SERPL-SCNC: 101 MMOL/L (ref 98–107)
CK SERPL-CCNC: 84 U/L (ref 20–200)
CO2 SERPL-SCNC: 32.5 MMOL/L (ref 22–29)
CREAT SERPL-MCNC: 0.93 MG/DL (ref 0.76–1.27)
CRP SERPL-MCNC: 2.94 MG/DL (ref 0–0.5)
DEPRECATED RDW RBC AUTO: 41.9 FL (ref 37–54)
EGFRCR SERPLBLD CKD-EPI 2021: 81 ML/MIN/1.73
ERYTHROCYTE [DISTWIDTH] IN BLOOD BY AUTOMATED COUNT: 11.7 % (ref 12.3–15.4)
ERYTHROCYTE [SEDIMENTATION RATE] IN BLOOD: 2 MM/HR (ref 0–20)
GLUCOSE SERPL-MCNC: 116 MG/DL (ref 65–99)
HCT VFR BLD AUTO: 37 % (ref 37.5–51)
HGB BLD-MCNC: 12.3 G/DL (ref 13–17.7)
MCH RBC QN AUTO: 32.5 PG (ref 26.6–33)
MCHC RBC AUTO-ENTMCNC: 33.2 G/DL (ref 31.5–35.7)
MCV RBC AUTO: 97.9 FL (ref 79–97)
PLATELET # BLD AUTO: 204 10*3/MM3 (ref 140–450)
PMV BLD AUTO: 9.6 FL (ref 6–12)
POTASSIUM SERPL-SCNC: 3.6 MMOL/L (ref 3.5–5.2)
RBC # BLD AUTO: 3.78 10*6/MM3 (ref 4.14–5.8)
SODIUM SERPL-SCNC: 139 MMOL/L (ref 136–145)
WBC NRBC COR # BLD: 8.17 10*3/MM3 (ref 3.4–10.8)

## 2022-11-23 PROCEDURE — 85027 COMPLETE CBC AUTOMATED: CPT | Performed by: STUDENT IN AN ORGANIZED HEALTH CARE EDUCATION/TRAINING PROGRAM

## 2022-11-23 PROCEDURE — 94799 UNLISTED PULMONARY SVC/PX: CPT

## 2022-11-23 PROCEDURE — 80048 BASIC METABOLIC PNL TOTAL CA: CPT | Performed by: STUDENT IN AN ORGANIZED HEALTH CARE EDUCATION/TRAINING PROGRAM

## 2022-11-23 PROCEDURE — 96372 THER/PROPH/DIAG INJ SC/IM: CPT

## 2022-11-23 PROCEDURE — 25010000002 ENOXAPARIN PER 10 MG: Performed by: STUDENT IN AN ORGANIZED HEALTH CARE EDUCATION/TRAINING PROGRAM

## 2022-11-23 PROCEDURE — G0378 HOSPITAL OBSERVATION PER HR: HCPCS

## 2022-11-23 PROCEDURE — 82550 ASSAY OF CK (CPK): CPT | Performed by: STUDENT IN AN ORGANIZED HEALTH CARE EDUCATION/TRAINING PROGRAM

## 2022-11-23 PROCEDURE — 85652 RBC SED RATE AUTOMATED: CPT | Performed by: STUDENT IN AN ORGANIZED HEALTH CARE EDUCATION/TRAINING PROGRAM

## 2022-11-23 PROCEDURE — 94664 DEMO&/EVAL PT USE INHALER: CPT

## 2022-11-23 PROCEDURE — 86140 C-REACTIVE PROTEIN: CPT | Performed by: STUDENT IN AN ORGANIZED HEALTH CARE EDUCATION/TRAINING PROGRAM

## 2022-11-23 RX ORDER — HYDROCODONE BITARTRATE AND ACETAMINOPHEN 7.5; 325 MG/1; MG/1
1 TABLET ORAL EVERY 6 HOURS PRN
Qty: 12 TABLET | Refills: 0 | Status: SHIPPED | OUTPATIENT
Start: 2022-11-23 | End: 2022-12-01 | Stop reason: HOSPADM

## 2022-11-23 RX ORDER — GABAPENTIN 300 MG/1
300 CAPSULE ORAL 3 TIMES DAILY
Qty: 9 CAPSULE | Refills: 0 | Status: SHIPPED | OUTPATIENT
Start: 2022-11-23 | End: 2022-12-01 | Stop reason: HOSPADM

## 2022-11-23 RX ORDER — METHYLPHENIDATE HYDROCHLORIDE 10 MG/1
10 TABLET ORAL DAILY
Qty: 30 TABLET | Refills: 0 | Status: SHIPPED | OUTPATIENT
Start: 2022-11-23 | End: 2022-12-01 | Stop reason: HOSPADM

## 2022-11-23 RX ORDER — HYDROCODONE BITARTRATE AND ACETAMINOPHEN 7.5; 325 MG/1; MG/1
1 TABLET ORAL EVERY 6 HOURS PRN
Qty: 12 TABLET | Refills: 0 | Status: SHIPPED | OUTPATIENT
Start: 2022-11-23 | End: 2022-11-23 | Stop reason: SDUPTHER

## 2022-11-23 RX ADMIN — IPRATROPIUM BROMIDE AND ALBUTEROL SULFATE 3 ML: 2.5; .5 SOLUTION RESPIRATORY (INHALATION) at 07:28

## 2022-11-23 RX ADMIN — GABAPENTIN 300 MG: 300 CAPSULE ORAL at 05:18

## 2022-11-23 RX ADMIN — ENOXAPARIN SODIUM 40 MG: 100 INJECTION SUBCUTANEOUS at 15:21

## 2022-11-23 RX ADMIN — GABAPENTIN 300 MG: 300 CAPSULE ORAL at 15:21

## 2022-11-23 RX ADMIN — FUROSEMIDE 40 MG: 40 TABLET ORAL at 08:58

## 2022-11-23 RX ADMIN — IPRATROPIUM BROMIDE AND ALBUTEROL SULFATE 3 ML: 2.5; .5 SOLUTION RESPIRATORY (INHALATION) at 14:32

## 2022-11-23 RX ADMIN — MIRABEGRON 25 MG: 25 TABLET, FILM COATED, EXTENDED RELEASE ORAL at 08:58

## 2022-11-23 RX ADMIN — MULTIPLE VITAMINS W/ MINERALS TAB 1 TABLET: TAB at 08:58

## 2022-11-23 RX ADMIN — FLUOXETINE HYDROCHLORIDE 20 MG: 20 CAPSULE ORAL at 08:58

## 2022-11-23 RX ADMIN — Medication 10 ML: at 08:58

## 2022-11-23 RX ADMIN — HYDROCODONE BITARTRATE AND ACETAMINOPHEN 1 TABLET: 7.5; 325 TABLET ORAL at 15:25

## 2022-11-23 RX ADMIN — PANTOPRAZOLE SODIUM 40 MG: 40 TABLET, DELAYED RELEASE ORAL at 05:18

## 2022-11-23 RX ADMIN — BUDESONIDE 0.5 MG: 0.5 INHALANT ORAL at 07:32

## 2022-11-23 RX ADMIN — HYDROCODONE BITARTRATE AND ACETAMINOPHEN 1 TABLET: 7.5; 325 TABLET ORAL at 05:18

## 2022-11-23 NOTE — PLAN OF CARE
Goal Outcome Evaluation:  Plan of Care Reviewed With: patient        Progress: improving  Outcome Evaluation: Patient stable throughout shift. VSS and voiding function intact. Patient evaluated by PT this am and has been able to ambulate short distances with walker and x1 assist. Pain managed with prn norco and states that he feels his pain is improving. MRI ordered and patient is awaiting scheduled time, screening sheet/consent completed and faxed to MRI.  Male primafit applied to attempt to prevent moisture assosciated skin injury and waffle mattress overlay applied to bed. CCP following for d/c planning.

## 2022-11-23 NOTE — CASE MANAGEMENT/SOCIAL WORK
Continued Stay Note  [facility]     Patient Name: Tony Leonard  MRN: [id number]  Today's Date: 11/23/2022    Admit Date: 11/21/2022    Plan: [facility] -- Accepted.   Discharge Plan     Row Name 11/23/22 1456       Plan    Plan [facility] -- Accepted.    Patient/Family in Agreement with Plan yes    Plan Comments Spoke with [facility] who has accepted the patient and has a SNF bed for him today at [facility]. Spoke with the patient's nurse who agrees that a wheelchair van transport is a safe mode of transport for him. Scheduled a [employer] Wheelchair Van for today at 1630 (spoke with Sarita). Updated the patient, [facility] and ANUSHA Cotton who are all agreeable with the d/c plan. [facility] called and left a message for the patient's HCS & son/Edward to discuss d/c planning. No other needs identified. Packet is on the chart.    Final Discharge Disposition Code 03 - skilled nursing facility (SNF)    Final Note [facility].               Discharge Codes    No documentation.               Expected Discharge Date and Time     Expected Discharge Date Expected Discharge Time    Nov 23, 2022             Gwen CAMPBELL, RN

## 2022-11-23 NOTE — DISCHARGE SUMMARY
Patient Name: Tony Leonard  : 1940  MRN: [id number]    Date of Admission: 2022  Date of Discharge:  2022  Primary Care Physician: Erich Aviles MD      Chief Complaint:   Hip Pain and Leg Pain      Discharge Diagnoses     Active Hospital Problems    Diagnosis  POA   • **Bilateral hip pain [M25.551, M25.552]  Yes      Resolved Hospital Problems   No resolved problems to display.        Hospital Course       This is an 82-year-old male with past medical history of right hip arthroplasty, COPD who came to the hospital after waking up from bed and trying to use restroom.  He reports that when he sat up he experienced a very sharp, severe hip pain, bilaterally, that radiates down the anterior/lower extremities.  Initially he reported that this continued never happened before, but on review questioning he reports that this has been happening for the last 3 months or so.  On the day of presentation, physical therapy was unable to work with him, and whenever he tried to work with him he started screaming because he was in pain.  The day after admission, physical therapy notes states that he was able to walk 10 feet with a use of a rolling walker. Inflammatory markers, including ESR, CRP were negative. CK was normal as well.     He will DC to SNF for physical therapy.   Day of Discharge       Physical Exam:  Temp:  [97.6 °F (36.4 °C)-98.5 °F (36.9 °C)] 98.1 °F (36.7 °C)  Heart Rate:  [55-74] 74  Resp:  [16-20] 18  BP: ()/(59-74) 121/74  Body mass index is 23.95 kg/m².  Physical Exam  Constitutional:       Appearance: Normal appearance.   Cardiovascular:      Rate and Rhythm: Normal rate and regular rhythm.   Pulmonary:      Effort: Pulmonary effort is normal. No respiratory distress.   Abdominal:      General: There is no distension.      Palpations: Abdomen is soft.      Tenderness: There is no abdominal tenderness.   Musculoskeletal:      Right lower leg: No edema.      Left lower leg:  No edema.   Neurological:      Mental Status: He is oriented to person, place, and time.      Motor: Weakness present.         Consultants     Consult Orders (all) (From admission, onward)     Start     Ordered    11/21/22 1227  [facility] (on-call MD unless specified) Details  Once        Specialty:  Hospitalist  Provider:  Alberto Tomlinson MD    11/21/22 1227              Procedures     Imaging Results (All)     Procedure Component Value Units Date/Time    XR Hips Bilateral With or Without Pelvis 5 View [[id number]] Collected: 11/21/22 0954     Updated: 11/21/22 1002    Narrative:      XR HIPS BILATERAL W OR WO PELVIS 5 VIEW-, XR SPINE LUMBAR COMPLETE 4+VW-     INDICATIONS: Pain     TECHNIQUE: 6 views of the lumbar spine, 7 views including the pelvis and  bilateral hips     COMPARISON: 08/04/2022     FINDINGS:     No acute fracture is identified.      Chronic L1 compression deformity is noted. Vertebral body heights appear  stable. Thoracolumbar levoscoliosis is present. Mild retrolisthesis of  L1 on L2, L2 on L3. Mild anterolisthesis of L3 on L4, L4 on L5.  Multilevel endplate spurring, disc space narrowing, facet arthropathy  are present. Arterial calcification is seen.     The pelvic ring appears grossly intact. Right hip arthroplasty hardware  appears intact. No hip dislocation. Left hip joint space appears  preserved.       Impression:         No acute fracture is identified in the lumbar spine, pelvis, or either  hip. Chronic, postsurgical, and degenerative changes. Follow-up/further  evaluation can be obtained as indications persist.     This report was finalized on 11/21/2022 9:59 AM by Dr. Tj Evans M.D.       XR Spine Lumbar Complete 4+VW [[id number]] Collected: 11/21/22 0954     Updated: 11/21/22 1002    Narrative:      XR HIPS BILATERAL W OR WO PELVIS 5 VIEW-, XR SPINE LUMBAR COMPLETE 4+VW-     INDICATIONS: Pain     TECHNIQUE: 6 views of the lumbar spine, 7 views including the pelvis and  bilateral  hips     COMPARISON: 08/04/2022     FINDINGS:     No acute fracture is identified.      Chronic L1 compression deformity is noted. Vertebral body heights appear  stable. Thoracolumbar levoscoliosis is present. Mild retrolisthesis of  L1 on L2, L2 on L3. Mild anterolisthesis of L3 on L4, L4 on L5.  Multilevel endplate spurring, disc space narrowing, facet arthropathy  are present. Arterial calcification is seen.     The pelvic ring appears grossly intact. Right hip arthroplasty hardware  appears intact. No hip dislocation. Left hip joint space appears  preserved.       Impression:         No acute fracture is identified in the lumbar spine, pelvis, or either  hip. Chronic, postsurgical, and degenerative changes. Follow-up/further  evaluation can be obtained as indications persist.     This report was finalized on 11/21/2022 9:59 AM by Dr. Tj Evans M.D.             Pertinent Labs     Results from last 7 days   Lab Units 11/23/22 0439 11/21/22 0902 11/19/22 0640 11/18/22  0545   WBC 10*3/mm3 8.17 11.16* 8.58 8.39   HEMOGLOBIN g/dL 12.3* 13.7 13.8 13.1   PLATELETS 10*3/mm3 204 217 213 224     Results from last 7 days   Lab Units 11/23/22 0439 11/21/22 0902 11/19/22 0640 11/18/22  0545   SODIUM mmol/L 139 142 137 141   POTASSIUM mmol/L 3.6 4.7 4.1 4.1   CHLORIDE mmol/L 101 103 101 103   CO2 mmol/L 32.5* 29.0 28.5 28.2   BUN mg/dL 32* 34* 34* 32*   CREATININE mg/dL 0.93 1.00 0.91 0.93   GLUCOSE mg/dL 116* 87 92 94   Estimated Creatinine Clearance: 65.1 mL/min (by C-G formula based on SCr of 0.93 mg/dL).    Results from last 7 days   Lab Units 11/23/22 0439 11/21/22 0902 11/19/22 0640 11/18/22  0545   CALCIUM mg/dL 8.6 8.5* 8.6 9.1       Results from last 7 days   Lab Units 11/23/22 0439   CK TOTAL U/L 84           Invalid input(s): LDLCALC        Test Results Pending at Discharge       Discharge Details        Discharge Medications      New Medications      Instructions Start Date    HYDROcodone-acetaminophen 7.5-325 MG per tablet  Commonly known as: NORCO   1 tablet, Oral, Every 6 Hours PRN         Changes to Medications      Instructions Start Date   benzonatate 200 MG capsule  Commonly known as: TESSALON  What changed: additional instructions   200 mg, Oral, 3 Times Daily PRN      budesonide 180 MCG/ACT inhaler  Commonly known as: PULMICORT  What changed: additional instructions   2 puffs, Inhalation, 2 Times Daily      pantoprazole 40 MG EC tablet  Commonly known as: PROTONIX  What changed: when to take this   40 mg, Oral, Daily      TiZANidine 6 MG capsule  Commonly known as: ZANAFLEX  What changed:   · when to take this  · reasons to take this   TAKE 1 CAPSULE EVERY NIGHT         Continue These Medications      Instructions Start Date   acetaminophen 500 MG tablet  Commonly known as: TYLENOL   500 mg, Oral, Every 6 Hours PRN      albuterol (2.5 MG/3ML) 0.083% nebulizer solution  Commonly known as: PROVENTIL   2.5 mg, Nebulization, Every 6 Hours PRN      calcium carbonate 500 MG chewable tablet  Commonly known as: TUMS   1 tablet, Oral, As Needed      FLUoxetine 20 MG capsule  Commonly known as: PROzac   TAKE 1 CAPSULE DAILY      furosemide 40 MG tablet  Commonly known as: LASIX   40 mg, Oral, Daily      gabapentin 300 MG capsule  Commonly known as: NEURONTIN   300 mg, Oral, 3 Times Daily      guaifenesin 100 MG/5ML liquid  Commonly known as: ROBITUSSIN   200 mg, Oral, Every 4 Hours PRN      Mirabegron ER 25 MG tablet sustained-release 24 hour 24 hr tablet  Commonly known as: Myrbetriq   25 mg, Oral, Daily      multivitamin with minerals tablet tablet   1 tablet, Oral, Daily      potassium chloride 20 MEQ CR tablet  Commonly known as: K-DUR,KLOR-CON   1 tablet, Oral, Daily      Salonpas 3.1-6-10 % patch  Generic drug: Camphor-Menthol-Methyl Sal   3 %, Apply externally, Every 12 Hours      sodium chloride 7 % nebulizer solution nebulizer solution   4 mL, Nebulization, As Needed       terbinafine 250 MG tablet  Commonly known as: lamiSIL   250 mg, Oral, Daily      Turmeric 500 MG capsule   500 mg, Oral, Daily      Umeclidinium-Vilanterol 62.5-25 MCG/ACT aerosol powder  inhaler  Commonly known as: ANORO ELLIPTA   1 puff, Inhalation, Daily      vitamin B-12 2500 MCG sublingual tablet tablet  Commonly known as: CYANOCOBALAMIN   2,500 mcg, Oral, Every Evening         ASK your doctor about these medications      Instructions Start Date   methylphenidate 10 MG tablet  Commonly known as: Ritalin   10 mg, Oral, Daily, ADD      rOPINIRole 0.5 MG tablet  Commonly known as: REQUIP   0.5 mg, Oral, Nightly, Take 1 hour before bedtime.             Allergies   Allergen Reactions   • Daliresp [Roflumilast] Anaphylaxis   • Latex Rash   • Sulfa Antibiotics Rash         Discharge Disposition: SNF      Discharge Diet:  Diet Order   Procedures   • Diet: Regular/House Diet; Texture: Regular Texture (IDDSI 7); Fluid Consistency: Thin (IDDSI 0)       Discharge Activity: as tolerated       CODE STATUS:    Code Status and Medical Interventions:   Ordered at: 11/21/22 1324     Code Status (Patient has no pulse and is not breathing):    CPR (Attempt to Resuscitate)     Medical Interventions (Patient has pulse or is breathing):    Full Support       Future Appointments   Date Time Provider Department Center   12/27/2022  9:20 AM Andrzej Gordon MD [facility] [address] [facility]      Contact information for after-discharge care     Destination     [facility] .    Service: Skilled Nursing  Contact information:  [address] [place] [address] [phone]                             Time Spent on Discharge:  Greater than 30 minutes      Alberto Tomlinson MD  [facility]  11/23/22  13:55 EST

## 2022-11-23 NOTE — PLAN OF CARE
Goal Outcome Evaluation:  Plan of Care Reviewed With: patient        Progress: improving  Outcome Evaluation: patient ambulating short distance with assistance, voiding without difficulty, PO medication given for pain, waiting for MRI to be completed, educated on pain management

## 2022-11-23 NOTE — PROGRESS NOTES
Name: Tony Leonard ADMIT: 2022   : 1940  PCP: Erich Aviles MD    MRN: [id number] LOS: 0 days   AGE/SEX: 82 y.o. male  ROOM: [facility]     Subjective   Subjective   He is able to work with physical therapy yesterday, and walk 10 feet.  This morning he is not complaining of any hip pain.  Laboratory markers and CK are completely normal    Review of Systems   Constitutional: Negative for chills and fever.   Respiratory: Negative for chest tightness and shortness of breath.    Cardiovascular: Negative for chest pain and palpitations.   Gastrointestinal: Negative for abdominal pain and constipation.        Objective   Objective   Vital Signs  Temp:  [97.6 °F (36.4 °C)-98.5 °F (36.9 °C)] 98.1 °F (36.7 °C)  Heart Rate:  [55-77] 74  Resp:  [16-20] 18  BP: ()/(59-74) 121/74  SpO2:  [90 %-97 %] 95 %  on   ;   Device (Oxygen Therapy): room air  Body mass index is 23.95 kg/m².  Physical Exam  Constitutional:       General: He is not in acute distress.     Comments: Chronically ill appearing    HENT:      Head: Normocephalic and atraumatic.   Cardiovascular:      Rate and Rhythm: Normal rate and regular rhythm.   Pulmonary:      Effort: Pulmonary effort is normal. No respiratory distress.   Abdominal:      General: There is no distension.      Palpations: Abdomen is soft.      Tenderness: There is no abdominal tenderness.   Skin:     General: Skin is warm and dry.   Neurological:      Mental Status: He is alert and oriented to person, place, and time.         Results Review     I reviewed the patient's new clinical results.  Results from last 7 days   Lab Units 22  04322  0902 22  0640 22  0545   WBC 10*3/mm3 8.17 11.16* 8.58 8.39   HEMOGLOBIN g/dL 12.3* 13.7 13.8 13.1   PLATELETS 10*3/mm3 204 217 213 224     Results from last 7 days   Lab Units 22  0439 22  0902 22  0640 22  0545   SODIUM mmol/L 139 142 137 141   POTASSIUM mmol/L 3.6 4.7 4.1 4.1    CHLORIDE mmol/L 101 103 101 103   CO2 mmol/L 32.5* 29.0 28.5 28.2   BUN mg/dL 32* 34* 34* 32*   CREATININE mg/dL 0.93 1.00 0.91 0.93   GLUCOSE mg/dL 116* 87 92 94   Estimated Creatinine Clearance: 65.1 mL/min (by C-G formula based on SCr of 0.93 mg/dL).    Results from last 7 days   Lab Units 11/23/22  0439 11/21/22  0902 11/19/22  0640 11/18/22  0545   CALCIUM mg/dL 8.6 8.5* 8.6 9.1         COVID19   Date Value Ref Range Status   11/13/2022 Not Detected Not Detected - Ref. Range Final   08/01/2022 Not Detected Not Detected - Ref. Range Final     No results found for: HGBA1C, POCGLU    XR Hips Bilateral With or Without Pelvis 5 View, XR Spine Lumbar Complete 4+VW  Narrative: XR HIPS BILATERAL W OR WO PELVIS 5 VIEW-, XR SPINE LUMBAR COMPLETE 4+VW-     INDICATIONS: Pain     TECHNIQUE: 6 views of the lumbar spine, 7 views including the pelvis and  bilateral hips     COMPARISON: 08/04/2022     FINDINGS:     No acute fracture is identified.      Chronic L1 compression deformity is noted. Vertebral body heights appear  stable. Thoracolumbar levoscoliosis is present. Mild retrolisthesis of  L1 on L2, L2 on L3. Mild anterolisthesis of L3 on L4, L4 on L5.  Multilevel endplate spurring, disc space narrowing, facet arthropathy  are present. Arterial calcification is seen.     The pelvic ring appears grossly intact. Right hip arthroplasty hardware  appears intact. No hip dislocation. Left hip joint space appears  preserved.     Impression:    No acute fracture is identified in the lumbar spine, pelvis, or either  hip. Chronic, postsurgical, and degenerative changes. Follow-up/further  evaluation can be obtained as indications persist.     This report was finalized on 11/21/2022 9:59 AM by Dr. Tj Evans M.D.       Scheduled Medications  budesonide, 0.5 mg, Nebulization, BID - RT  enoxaparin, 40 mg, Subcutaneous, Q24H  FLUoxetine, 20 mg, Oral, Daily  furosemide, 40 mg, Oral, Daily  gabapentin, 300 mg, Oral,  Q8H  ipratropium-albuterol, 3 mL, Nebulization, Q8H - RT  Mirabegron ER, 25 mg, Oral, Daily  multivitamin with minerals, 1 tablet, Oral, Daily  pantoprazole, 40 mg, Oral, QAM  rOPINIRole, 0.5 mg, Oral, Nightly  sodium chloride, 10 mL, Intravenous, Q12H    Infusions   Diet  Diet: Regular/House Diet; Texture: Regular Texture (IDDSI 7); Fluid Consistency: Thin (IDDSI 0)       Assessment/Plan     Active Hospital Problems    Diagnosis  POA   • **Bilateral hip pain [M25.551, M25.552]  Yes      Resolved Hospital Problems   No resolved problems to display.       82 y.o. male admitted with Bilateral hip pain.    82-year-old man with a past medical history of COPD presents to the hospital with bilateral hip pain upon  awakening this morning.     Bilateral hip pain  History of sacral alar fracture on MRI 3/2022  Has been occurring for 3 months  Interestingly enough when the patient first came to the hospital he was screaming and yelling because whenever physical therapy try to work with him he was in excruciating, intolerable pain.  The following day he was walking with physical therapy and did not complain of any pain.  The plain films of his hips are unremarkable.  ESR, CRP and CK are also within normal as well  MRI of the lumbar spine and pelvis are pending     COPD  Resume home medicaiotns     Hypertension  GERD  Resume home medications     · Lovenox 40 mg SC daily for DVT prophylaxis.  · Full code.  · Discussed with patient and nursing staff.  · Discharge to SNU when arrangements have been made       Alberto Tomlinson MD  [facility]  11/23/22  13:02 EST    Patient was wearing facemask when I entered the room and throughout our encounter.  I wore protective equipment throughout this patient encounter including a face mask, gloves and protective eyewear.  Hand hygiene was performed before donning protective equipment and after removal when leaving the room.

## 2022-11-25 ENCOUNTER — APPOINTMENT (OUTPATIENT)
Dept: CT IMAGING | Facility: HOSPITAL | Age: 82
End: 2022-11-25

## 2022-11-25 ENCOUNTER — APPOINTMENT (OUTPATIENT)
Dept: GENERAL RADIOLOGY | Facility: HOSPITAL | Age: 82
End: 2022-11-25

## 2022-11-25 PROCEDURE — 73502 X-RAY EXAM HIP UNI 2-3 VIEWS: CPT

## 2022-11-25 PROCEDURE — 70450 CT HEAD/BRAIN W/O DYE: CPT

## 2022-11-25 PROCEDURE — 0BH17EZ INSERTION OF ENDOTRACHEAL AIRWAY INTO TRACHEA, VIA NATURAL OR ARTIFICIAL OPENING: ICD-10-PCS | Performed by: EMERGENCY MEDICINE

## 2022-11-25 PROCEDURE — 72125 CT NECK SPINE W/O DYE: CPT

## 2022-11-25 PROCEDURE — 5A1945Z RESPIRATORY VENTILATION, 24-96 CONSECUTIVE HOURS: ICD-10-PCS | Performed by: EMERGENCY MEDICINE

## 2022-11-25 PROCEDURE — 99291 CRITICAL CARE FIRST HOUR: CPT

## 2022-11-26 ENCOUNTER — HOSPITAL ENCOUNTER (INPATIENT)
Facility: HOSPITAL | Age: 82
LOS: 5 days | Discharge: NURSING, REHAB OR LONG-TERM CARE | End: 2022-12-01
Attending: EMERGENCY MEDICINE

## 2022-11-26 ENCOUNTER — APPOINTMENT (OUTPATIENT)
Dept: GENERAL RADIOLOGY | Facility: HOSPITAL | Age: 82
End: 2022-11-26

## 2022-11-26 DIAGNOSIS — A41.9 SEPTIC SHOCK: Primary | ICD-10-CM

## 2022-11-26 DIAGNOSIS — M25.551 BILATERAL HIP PAIN: ICD-10-CM

## 2022-11-26 DIAGNOSIS — R65.21 SEPTIC SHOCK: Primary | ICD-10-CM

## 2022-11-26 DIAGNOSIS — M25.552 BILATERAL HIP PAIN: ICD-10-CM

## 2022-11-26 LAB
ALBUMIN SERPL-MCNC: 2.4 G/DL (ref 3.5–5.2)
ALBUMIN/GLOB SERPL: 1 G/DL
ALP SERPL-CCNC: 72 U/L (ref 39–117)
ALT SERPL W P-5'-P-CCNC: 36 U/L (ref 1–41)
ANION GAP SERPL CALCULATED.3IONS-SCNC: 10.1 MMOL/L (ref 5–15)
ANION GAP SERPL CALCULATED.3IONS-SCNC: 8.8 MMOL/L (ref 5–15)
ARTERIAL PATENCY WRIST A: ABNORMAL
AST SERPL-CCNC: 54 U/L (ref 1–40)
ATMOSPHERIC PRESS: 755.9 MMHG
B PARAPERT DNA SPEC QL NAA+PROBE: NOT DETECTED
B PERT DNA SPEC QL NAA+PROBE: NOT DETECTED
BACTERIA UR QL AUTO: NORMAL /HPF
BASE EXCESS BLDA CALC-SCNC: -3.2 MMOL/L (ref 0–2)
BASOPHILS # BLD AUTO: 0.04 10*3/MM3 (ref 0–0.2)
BASOPHILS NFR BLD AUTO: 0.2 % (ref 0–1.5)
BDY SITE: ABNORMAL
BILIRUB SERPL-MCNC: 0.6 MG/DL (ref 0–1.2)
BILIRUB UR QL STRIP: NEGATIVE
BUN SERPL-MCNC: 42 MG/DL (ref 8–23)
BUN SERPL-MCNC: 56 MG/DL (ref 8–23)
BUN/CREAT SERPL: 21.3 (ref 7–25)
BUN/CREAT SERPL: 33.9 (ref 7–25)
C PNEUM DNA NPH QL NAA+NON-PROBE: NOT DETECTED
CALCIUM SPEC-SCNC: 7.4 MG/DL (ref 8.6–10.5)
CALCIUM SPEC-SCNC: 7.9 MG/DL (ref 8.6–10.5)
CHLORIDE SERPL-SCNC: 103 MMOL/L (ref 98–107)
CHLORIDE SERPL-SCNC: 107 MMOL/L (ref 98–107)
CLARITY UR: ABNORMAL
CO2 SERPL-SCNC: 21.2 MMOL/L (ref 22–29)
CO2 SERPL-SCNC: 25.9 MMOL/L (ref 22–29)
COLOR UR: YELLOW
CREAT SERPL-MCNC: 1.24 MG/DL (ref 0.76–1.27)
CREAT SERPL-MCNC: 2.63 MG/DL (ref 0.76–1.27)
D-LACTATE SERPL-SCNC: 0.9 MMOL/L (ref 0.5–2)
DEPRECATED RDW RBC AUTO: 44.2 FL (ref 37–54)
EGFRCR SERPLBLD CKD-EPI 2021: 23.3 ML/MIN/1.73
EGFRCR SERPLBLD CKD-EPI 2021: 57.3 ML/MIN/1.73
EOSINOPHIL # BLD AUTO: 0.02 10*3/MM3 (ref 0–0.4)
EOSINOPHIL NFR BLD AUTO: 0.1 % (ref 0.3–6.2)
ERYTHROCYTE [DISTWIDTH] IN BLOOD BY AUTOMATED COUNT: 12.1 % (ref 12.3–15.4)
FLUAV SUBTYP SPEC NAA+PROBE: NOT DETECTED
FLUBV RNA ISLT QL NAA+PROBE: NOT DETECTED
GLOBULIN UR ELPH-MCNC: 2.4 GM/DL
GLUCOSE BLDC GLUCOMTR-MCNC: 130 MG/DL (ref 70–130)
GLUCOSE BLDC GLUCOMTR-MCNC: 145 MG/DL (ref 70–130)
GLUCOSE SERPL-MCNC: 123 MG/DL (ref 65–99)
GLUCOSE SERPL-MCNC: 132 MG/DL (ref 65–99)
GLUCOSE UR STRIP-MCNC: NEGATIVE MG/DL
HADV DNA SPEC NAA+PROBE: NOT DETECTED
HCO3 BLDA-SCNC: 23.9 MMOL/L (ref 22–28)
HCOV 229E RNA SPEC QL NAA+PROBE: NOT DETECTED
HCOV HKU1 RNA SPEC QL NAA+PROBE: NOT DETECTED
HCOV NL63 RNA SPEC QL NAA+PROBE: NOT DETECTED
HCOV OC43 RNA SPEC QL NAA+PROBE: NOT DETECTED
HCT VFR BLD AUTO: 33.6 % (ref 37.5–51)
HGB BLD-MCNC: 10.9 G/DL (ref 13–17.7)
HGB UR QL STRIP.AUTO: ABNORMAL
HMPV RNA NPH QL NAA+NON-PROBE: NOT DETECTED
HPIV1 RNA ISLT QL NAA+PROBE: NOT DETECTED
HPIV2 RNA SPEC QL NAA+PROBE: NOT DETECTED
HPIV3 RNA NPH QL NAA+PROBE: NOT DETECTED
HPIV4 P GENE NPH QL NAA+PROBE: NOT DETECTED
HYALINE CASTS UR QL AUTO: NORMAL /LPF
IMM GRANULOCYTES # BLD AUTO: 0.32 10*3/MM3 (ref 0–0.05)
IMM GRANULOCYTES NFR BLD AUTO: 1.2 % (ref 0–0.5)
INHALED O2 CONCENTRATION: 100 %
KETONES UR QL STRIP: NEGATIVE
LEUKOCYTE ESTERASE UR QL STRIP.AUTO: ABNORMAL
LYMPHOCYTES # BLD AUTO: 1.21 10*3/MM3 (ref 0.7–3.1)
LYMPHOCYTES NFR BLD AUTO: 4.7 % (ref 19.6–45.3)
M PNEUMO IGG SER IA-ACNC: NOT DETECTED
MCH RBC QN AUTO: 32 PG (ref 26.6–33)
MCHC RBC AUTO-ENTMCNC: 32.4 G/DL (ref 31.5–35.7)
MCV RBC AUTO: 98.5 FL (ref 79–97)
MODALITY: ABNORMAL
MONOCYTES # BLD AUTO: 2.04 10*3/MM3 (ref 0.1–0.9)
MONOCYTES NFR BLD AUTO: 7.8 % (ref 5–12)
NEUTROPHILS NFR BLD AUTO: 22.38 10*3/MM3 (ref 1.7–7)
NEUTROPHILS NFR BLD AUTO: 86 % (ref 42.7–76)
NITRITE UR QL STRIP: NEGATIVE
NRBC BLD AUTO-RTO: 0 /100 WBC (ref 0–0.2)
O2 A-A PPRESDIFF RESPIRATORY: 0.7 MMHG
PCO2 BLDA: 50.8 MM HG (ref 35–45)
PEEP RESPIRATORY: 5 CM[H2O]
PH BLDA: 7.28 PH UNITS (ref 7.35–7.45)
PH UR STRIP.AUTO: 5.5 [PH] (ref 5–8)
PLATELET # BLD AUTO: 162 10*3/MM3 (ref 140–450)
PMV BLD AUTO: 9.3 FL (ref 6–12)
PO2 BLDA: 494.9 MM HG (ref 80–100)
POTASSIUM SERPL-SCNC: 3.6 MMOL/L (ref 3.5–5.2)
POTASSIUM SERPL-SCNC: 3.8 MMOL/L (ref 3.5–5.2)
PROCALCITONIN SERPL-MCNC: 1.74 NG/ML (ref 0–0.25)
PROT SERPL-MCNC: 4.8 G/DL (ref 6–8.5)
PROT UR QL STRIP: NEGATIVE
QT INTERVAL: 465 MS
RBC # BLD AUTO: 3.41 10*6/MM3 (ref 4.14–5.8)
RBC # UR STRIP: NORMAL /HPF
REF LAB TEST METHOD: NORMAL
RHINOVIRUS RNA SPEC NAA+PROBE: NOT DETECTED
RSV RNA NPH QL NAA+NON-PROBE: NOT DETECTED
SAO2 % BLDCOA: 100 % (ref 92–99)
SARS-COV-2 RNA NPH QL NAA+NON-PROBE: NOT DETECTED
SET MECH RESP RATE: 18
SODIUM SERPL-SCNC: 137 MMOL/L (ref 136–145)
SODIUM SERPL-SCNC: 139 MMOL/L (ref 136–145)
SP GR UR STRIP: 1.02 (ref 1–1.03)
SQUAMOUS #/AREA URNS HPF: NORMAL /HPF
TOTAL RATE: 19 BREATHS/MINUTE
TROPONIN T SERPL-MCNC: 0.02 NG/ML (ref 0–0.03)
UROBILINOGEN UR QL STRIP: ABNORMAL
VANCOMYCIN SERPL-MCNC: 10 MCG/ML (ref 5–40)
VENTILATOR MODE: AC
VT ON VENT VENT: 500 ML
WBC # UR STRIP: NORMAL /HPF
WBC NRBC COR # BLD: 26.01 10*3/MM3 (ref 3.4–10.8)

## 2022-11-26 PROCEDURE — 84145 PROCALCITONIN (PCT): CPT | Performed by: EMERGENCY MEDICINE

## 2022-11-26 PROCEDURE — 82962 GLUCOSE BLOOD TEST: CPT

## 2022-11-26 PROCEDURE — 93010 ELECTROCARDIOGRAM REPORT: CPT | Performed by: INTERNAL MEDICINE

## 2022-11-26 PROCEDURE — 93005 ELECTROCARDIOGRAM TRACING: CPT | Performed by: INTERNAL MEDICINE

## 2022-11-26 PROCEDURE — 94799 UNLISTED PULMONARY SVC/PX: CPT

## 2022-11-26 PROCEDURE — 81001 URINALYSIS AUTO W/SCOPE: CPT | Performed by: EMERGENCY MEDICINE

## 2022-11-26 PROCEDURE — 84484 ASSAY OF TROPONIN QUANT: CPT | Performed by: EMERGENCY MEDICINE

## 2022-11-26 PROCEDURE — 94760 N-INVAS EAR/PLS OXIMETRY 1: CPT

## 2022-11-26 PROCEDURE — 0202U NFCT DS 22 TRGT SARS-COV-2: CPT | Performed by: EMERGENCY MEDICINE

## 2022-11-26 PROCEDURE — 36415 COLL VENOUS BLD VENIPUNCTURE: CPT

## 2022-11-26 PROCEDURE — 36600 WITHDRAWAL OF ARTERIAL BLOOD: CPT

## 2022-11-26 PROCEDURE — 80202 ASSAY OF VANCOMYCIN: CPT | Performed by: INTERNAL MEDICINE

## 2022-11-26 PROCEDURE — 25010000002 FENTANYL CITRATE (PF) 1000 MCG/20ML SOLUTION: Performed by: EMERGENCY MEDICINE

## 2022-11-26 PROCEDURE — 94761 N-INVAS EAR/PLS OXIMETRY MLT: CPT

## 2022-11-26 PROCEDURE — 25010000002 SUCCINYLCHOLINE PER 20 MG: Performed by: EMERGENCY MEDICINE

## 2022-11-26 PROCEDURE — 94002 VENT MGMT INPAT INIT DAY: CPT

## 2022-11-26 PROCEDURE — 83605 ASSAY OF LACTIC ACID: CPT | Performed by: EMERGENCY MEDICINE

## 2022-11-26 PROCEDURE — 80053 COMPREHEN METABOLIC PANEL: CPT | Performed by: EMERGENCY MEDICINE

## 2022-11-26 PROCEDURE — 25010000002 VANCOMYCIN 10 G RECONSTITUTED SOLUTION: Performed by: EMERGENCY MEDICINE

## 2022-11-26 PROCEDURE — 82803 BLOOD GASES ANY COMBINATION: CPT

## 2022-11-26 PROCEDURE — 25010000002 CEFEPIME PER 500 MG: Performed by: EMERGENCY MEDICINE

## 2022-11-26 PROCEDURE — 25010000002 HEPARIN (PORCINE) PER 1000 UNITS: Performed by: INTERNAL MEDICINE

## 2022-11-26 PROCEDURE — 25010000002 VANCOMYCIN 750 MG RECONSTITUTED SOLUTION: Performed by: INTERNAL MEDICINE

## 2022-11-26 PROCEDURE — 25010000002 MIDAZOLAM 50 MG/10ML SOLUTION: Performed by: EMERGENCY MEDICINE

## 2022-11-26 PROCEDURE — 71045 X-RAY EXAM CHEST 1 VIEW: CPT

## 2022-11-26 PROCEDURE — 87040 BLOOD CULTURE FOR BACTERIA: CPT | Performed by: EMERGENCY MEDICINE

## 2022-11-26 PROCEDURE — 85025 COMPLETE CBC W/AUTO DIFF WBC: CPT | Performed by: EMERGENCY MEDICINE

## 2022-11-26 PROCEDURE — 93005 ELECTROCARDIOGRAM TRACING: CPT | Performed by: EMERGENCY MEDICINE

## 2022-11-26 PROCEDURE — P9612 CATHETERIZE FOR URINE SPEC: HCPCS

## 2022-11-26 RX ORDER — NOREPINEPHRINE BIT/0.9 % NACL 8 MG/250ML
.02-.3 INFUSION BOTTLE (ML) INTRAVENOUS
Status: DISCONTINUED | OUTPATIENT
Start: 2022-11-26 | End: 2022-11-27

## 2022-11-26 RX ORDER — SODIUM CHLORIDE 9 MG/ML
75 INJECTION, SOLUTION INTRAVENOUS CONTINUOUS
Status: DISCONTINUED | OUTPATIENT
Start: 2022-11-26 | End: 2022-11-27

## 2022-11-26 RX ORDER — HEPARIN SODIUM 5000 [USP'U]/ML
5000 INJECTION, SOLUTION INTRAVENOUS; SUBCUTANEOUS EVERY 8 HOURS SCHEDULED
Status: DISCONTINUED | OUTPATIENT
Start: 2022-11-26 | End: 2022-12-01 | Stop reason: HOSPADM

## 2022-11-26 RX ORDER — HYDROCODONE BITARTRATE AND ACETAMINOPHEN 7.5; 325 MG/1; MG/1
1 TABLET ORAL EVERY 6 HOURS PRN
Status: DISPENSED | OUTPATIENT
Start: 2022-11-26 | End: 2022-11-30

## 2022-11-26 RX ORDER — AMOXICILLIN 250 MG
2 CAPSULE ORAL NIGHTLY
Status: DISCONTINUED | OUTPATIENT
Start: 2022-11-26 | End: 2022-12-01 | Stop reason: HOSPADM

## 2022-11-26 RX ORDER — CHLORZOXAZONE 500 MG/1
500 TABLET ORAL ONCE
Status: DISCONTINUED | OUTPATIENT
Start: 2022-11-26 | End: 2022-11-26

## 2022-11-26 RX ORDER — GABAPENTIN 100 MG/1
100 CAPSULE ORAL EVERY 12 HOURS SCHEDULED
Status: DISCONTINUED | OUTPATIENT
Start: 2022-11-26 | End: 2022-12-01 | Stop reason: HOSPADM

## 2022-11-26 RX ORDER — ETOMIDATE 2 MG/ML
0.3 INJECTION INTRAVENOUS ONCE
Status: COMPLETED | OUTPATIENT
Start: 2022-11-26 | End: 2022-11-26

## 2022-11-26 RX ORDER — ONDANSETRON 2 MG/ML
4 INJECTION INTRAMUSCULAR; INTRAVENOUS EVERY 6 HOURS PRN
Status: DISCONTINUED | OUTPATIENT
Start: 2022-11-26 | End: 2022-12-01 | Stop reason: HOSPADM

## 2022-11-26 RX ORDER — ROPINIROLE 0.5 MG/1
0.5 TABLET, FILM COATED ORAL NIGHTLY
Status: DISCONTINUED | OUTPATIENT
Start: 2022-11-26 | End: 2022-12-01 | Stop reason: HOSPADM

## 2022-11-26 RX ORDER — ONDANSETRON 4 MG/1
4 TABLET, FILM COATED ORAL EVERY 6 HOURS PRN
Status: DISCONTINUED | OUTPATIENT
Start: 2022-11-26 | End: 2022-12-01 | Stop reason: HOSPADM

## 2022-11-26 RX ORDER — FAMOTIDINE 10 MG/ML
20 INJECTION, SOLUTION INTRAVENOUS DAILY
Status: DISCONTINUED | OUTPATIENT
Start: 2022-11-26 | End: 2022-11-30

## 2022-11-26 RX ORDER — ACETAMINOPHEN 500 MG
1000 TABLET ORAL ONCE
Status: DISCONTINUED | OUTPATIENT
Start: 2022-11-26 | End: 2022-11-27

## 2022-11-26 RX ORDER — FENTANYL CITRATE-0.9 % NACL/PF 5 MCG/ML
50-200 PLASTIC BAG, INJECTION (ML) INTRAVENOUS
Status: DISCONTINUED | OUTPATIENT
Start: 2022-11-26 | End: 2022-11-27

## 2022-11-26 RX ORDER — SODIUM CHLORIDE 0.9 % (FLUSH) 0.9 %
10 SYRINGE (ML) INJECTION EVERY 12 HOURS SCHEDULED
Status: DISCONTINUED | OUTPATIENT
Start: 2022-11-26 | End: 2022-12-01 | Stop reason: HOSPADM

## 2022-11-26 RX ORDER — DEXMEDETOMIDINE HYDROCHLORIDE 4 UG/ML
.2-1.5 INJECTION, SOLUTION INTRAVENOUS
Status: DISCONTINUED | OUTPATIENT
Start: 2022-11-26 | End: 2022-11-28

## 2022-11-26 RX ORDER — SUCCINYLCHOLINE CHLORIDE 20 MG/ML
1.5 INJECTION INTRAMUSCULAR; INTRAVENOUS ONCE
Status: COMPLETED | OUTPATIENT
Start: 2022-11-26 | End: 2022-11-26

## 2022-11-26 RX ORDER — MORPHINE SULFATE 2 MG/ML
2 INJECTION, SOLUTION INTRAMUSCULAR; INTRAVENOUS ONCE
Status: DISCONTINUED | OUTPATIENT
Start: 2022-11-26 | End: 2022-11-26

## 2022-11-26 RX ORDER — SODIUM CHLORIDE 0.9 % (FLUSH) 0.9 %
10 SYRINGE (ML) INJECTION AS NEEDED
Status: DISCONTINUED | OUTPATIENT
Start: 2022-11-26 | End: 2022-12-01 | Stop reason: HOSPADM

## 2022-11-26 RX ORDER — LIDOCAINE 50 MG/G
1 PATCH TOPICAL ONCE
Status: DISCONTINUED | OUTPATIENT
Start: 2022-11-26 | End: 2022-11-26

## 2022-11-26 RX ORDER — SODIUM CHLORIDE 9 MG/ML
40 INJECTION, SOLUTION INTRAVENOUS AS NEEDED
Status: DISCONTINUED | OUTPATIENT
Start: 2022-11-26 | End: 2022-12-01 | Stop reason: HOSPADM

## 2022-11-26 RX ADMIN — ETOMIDATE 24.48 MG: 2 INJECTION INTRAVENOUS at 03:17

## 2022-11-26 RX ADMIN — Medication 0.02 MCG/KG/MIN: at 02:54

## 2022-11-26 RX ADMIN — SODIUM CHLORIDE 1000 ML: 9 INJECTION, SOLUTION INTRAVENOUS at 02:06

## 2022-11-26 RX ADMIN — VANCOMYCIN HYDROCHLORIDE 1750 MG: 10 INJECTION, POWDER, LYOPHILIZED, FOR SOLUTION INTRAVENOUS at 05:36

## 2022-11-26 RX ADMIN — VASOPRESSIN 0.03 UNITS/MIN: 20 INJECTION INTRAVENOUS at 15:04

## 2022-11-26 RX ADMIN — SODIUM CHLORIDE 1000 ML: 9 INJECTION, SOLUTION INTRAVENOUS at 02:37

## 2022-11-26 RX ADMIN — DEXMEDETOMIDINE HYDROCHLORIDE 1 MCG/KG/HR: 4 INJECTION, SOLUTION INTRAVENOUS at 19:59

## 2022-11-26 RX ADMIN — DEXMEDETOMIDINE HYDROCHLORIDE 0.2 MCG/KG/HR: 4 INJECTION, SOLUTION INTRAVENOUS at 10:47

## 2022-11-26 RX ADMIN — CEFEPIME 2 G: 2 INJECTION, POWDER, FOR SOLUTION INTRAVENOUS at 03:02

## 2022-11-26 RX ADMIN — GABAPENTIN 100 MG: 100 CAPSULE ORAL at 10:47

## 2022-11-26 RX ADMIN — ROPINIROLE HYDROCHLORIDE 0.5 MG: 0.5 TABLET, FILM COATED ORAL at 21:36

## 2022-11-26 RX ADMIN — Medication 10 ML: at 21:36

## 2022-11-26 RX ADMIN — FAMOTIDINE 20 MG: 10 INJECTION INTRAVENOUS at 10:46

## 2022-11-26 RX ADMIN — SODIUM CHLORIDE 750 MG: 900 INJECTION, SOLUTION INTRAVENOUS at 21:35

## 2022-11-26 RX ADMIN — HEPARIN SODIUM 5000 UNITS: 5000 INJECTION INTRAVENOUS; SUBCUTANEOUS at 21:36

## 2022-11-26 RX ADMIN — GABAPENTIN 100 MG: 100 CAPSULE ORAL at 21:35

## 2022-11-26 RX ADMIN — SUCCINYLCHOLINE CHLORIDE 122.4 MG: 20 INJECTION, SOLUTION INTRAMUSCULAR; INTRAVENOUS; PARENTERAL at 03:20

## 2022-11-26 RX ADMIN — Medication 10 ML: at 10:49

## 2022-11-26 RX ADMIN — VASOPRESSIN 0.03 UNITS/MIN: 20 INJECTION INTRAVENOUS at 04:08

## 2022-11-26 RX ADMIN — SODIUM CHLORIDE 75 ML/HR: 9 INJECTION, SOLUTION INTRAVENOUS at 10:45

## 2022-11-26 RX ADMIN — SODIUM CHLORIDE 1000 ML: 9 INJECTION, SOLUTION INTRAVENOUS at 08:22

## 2022-11-26 RX ADMIN — DOCUSATE SODIUM 50MG AND SENNOSIDES 8.6MG 2 TABLET: 8.6; 5 TABLET, FILM COATED ORAL at 21:35

## 2022-11-26 RX ADMIN — MIDAZOLAM 1 MG/HR: 5 INJECTION INTRAMUSCULAR; INTRAVENOUS at 03:55

## 2022-11-26 RX ADMIN — FENTANYL CITRATE 50 MCG/HR: 50 INJECTION, SOLUTION INTRAMUSCULAR; INTRAVENOUS at 04:05

## 2022-11-26 RX ADMIN — HEPARIN SODIUM 5000 UNITS: 5000 INJECTION INTRAVENOUS; SUBCUTANEOUS at 14:49

## 2022-11-27 ENCOUNTER — APPOINTMENT (OUTPATIENT)
Dept: GENERAL RADIOLOGY | Facility: HOSPITAL | Age: 82
End: 2022-11-27

## 2022-11-27 LAB
ANION GAP SERPL CALCULATED.3IONS-SCNC: 10 MMOL/L (ref 5–15)
ARTERIAL PATENCY WRIST A: ABNORMAL
ATMOSPHERIC PRESS: 739.9 MMHG
BASE EXCESS BLDA CALC-SCNC: -1.6 MMOL/L (ref 0–2)
BASOPHILS # BLD AUTO: 0.03 10*3/MM3 (ref 0–0.2)
BASOPHILS NFR BLD AUTO: 0.2 % (ref 0–1.5)
BDY SITE: ABNORMAL
BUN SERPL-MCNC: 35 MG/DL (ref 8–23)
BUN/CREAT SERPL: 37.6 (ref 7–25)
CALCIUM SPEC-SCNC: 7.9 MG/DL (ref 8.6–10.5)
CHLORIDE SERPL-SCNC: 109 MMOL/L (ref 98–107)
CO2 SERPL-SCNC: 22 MMOL/L (ref 22–29)
CREAT SERPL-MCNC: 0.93 MG/DL (ref 0.76–1.27)
D-LACTATE SERPL-SCNC: 1 MMOL/L (ref 0.5–2)
DEPRECATED RDW RBC AUTO: 42.9 FL (ref 37–54)
EGFRCR SERPLBLD CKD-EPI 2021: 81 ML/MIN/1.73
EOSINOPHIL # BLD AUTO: 0.36 10*3/MM3 (ref 0–0.4)
EOSINOPHIL NFR BLD AUTO: 2.2 % (ref 0.3–6.2)
ERYTHROCYTE [DISTWIDTH] IN BLOOD BY AUTOMATED COUNT: 11.8 % (ref 12.3–15.4)
GLUCOSE SERPL-MCNC: 118 MG/DL (ref 65–99)
HCO3 BLDA-SCNC: 23.4 MMOL/L (ref 22–28)
HCT VFR BLD AUTO: 33.7 % (ref 37.5–51)
HGB BLD-MCNC: 11.1 G/DL (ref 13–17.7)
IMM GRANULOCYTES # BLD AUTO: 0.1 10*3/MM3 (ref 0–0.05)
IMM GRANULOCYTES NFR BLD AUTO: 0.6 % (ref 0–0.5)
INHALED O2 CONCENTRATION: 30 %
INR PPP: 1.13 (ref 0.9–1.1)
LYMPHOCYTES # BLD AUTO: 0.94 10*3/MM3 (ref 0.7–3.1)
LYMPHOCYTES NFR BLD AUTO: 5.7 % (ref 19.6–45.3)
MAGNESIUM SERPL-MCNC: 2.3 MG/DL (ref 1.6–2.4)
MCH RBC QN AUTO: 32.4 PG (ref 26.6–33)
MCHC RBC AUTO-ENTMCNC: 32.9 G/DL (ref 31.5–35.7)
MCV RBC AUTO: 98.3 FL (ref 79–97)
MODALITY: ABNORMAL
MONOCYTES # BLD AUTO: 1.08 10*3/MM3 (ref 0.1–0.9)
MONOCYTES NFR BLD AUTO: 6.5 % (ref 5–12)
MRSA DNA SPEC QL NAA+PROBE: NORMAL
NEUTROPHILS NFR BLD AUTO: 14.12 10*3/MM3 (ref 1.7–7)
NEUTROPHILS NFR BLD AUTO: 84.8 % (ref 42.7–76)
NRBC BLD AUTO-RTO: 0 /100 WBC (ref 0–0.2)
O2 A-A PPRESDIFF RESPIRATORY: 0.6 MMHG
PCO2 BLDA: 40.1 MM HG (ref 35–45)
PEEP RESPIRATORY: 5 CM[H2O]
PH BLDA: 7.38 PH UNITS (ref 7.35–7.45)
PHOSPHATE SERPL-MCNC: 2.4 MG/DL (ref 2.5–4.5)
PLATELET # BLD AUTO: 163 10*3/MM3 (ref 140–450)
PMV BLD AUTO: 9.8 FL (ref 6–12)
PO2 BLDA: 106.2 MM HG (ref 80–100)
POTASSIUM SERPL-SCNC: 3.6 MMOL/L (ref 3.5–5.2)
PROCALCITONIN SERPL-MCNC: 0.88 NG/ML (ref 0–0.25)
PROTHROMBIN TIME: 14.6 SECONDS (ref 11.7–14.2)
RBC # BLD AUTO: 3.43 10*6/MM3 (ref 4.14–5.8)
SAO2 % BLDCOA: 98 % (ref 92–99)
SET MECH RESP RATE: 18
SODIUM SERPL-SCNC: 141 MMOL/L (ref 136–145)
TOTAL RATE: 18 BREATHS/MINUTE
VANCOMYCIN SERPL-MCNC: 12.2 MCG/ML (ref 5–40)
VENTILATOR MODE: AC
VT ON VENT VENT: 500 ML
WBC NRBC COR # BLD: 16.63 10*3/MM3 (ref 3.4–10.8)

## 2022-11-27 PROCEDURE — 87641 MR-STAPH DNA AMP PROBE: CPT | Performed by: INTERNAL MEDICINE

## 2022-11-27 PROCEDURE — 83605 ASSAY OF LACTIC ACID: CPT | Performed by: INTERNAL MEDICINE

## 2022-11-27 PROCEDURE — 82803 BLOOD GASES ANY COMBINATION: CPT

## 2022-11-27 PROCEDURE — 80048 BASIC METABOLIC PNL TOTAL CA: CPT | Performed by: INTERNAL MEDICINE

## 2022-11-27 PROCEDURE — 36600 WITHDRAWAL OF ARTERIAL BLOOD: CPT

## 2022-11-27 PROCEDURE — 94799 UNLISTED PULMONARY SVC/PX: CPT

## 2022-11-27 PROCEDURE — 94761 N-INVAS EAR/PLS OXIMETRY MLT: CPT

## 2022-11-27 PROCEDURE — 94003 VENT MGMT INPAT SUBQ DAY: CPT

## 2022-11-27 PROCEDURE — 85025 COMPLETE CBC W/AUTO DIFF WBC: CPT | Performed by: INTERNAL MEDICINE

## 2022-11-27 PROCEDURE — 83735 ASSAY OF MAGNESIUM: CPT | Performed by: INTERNAL MEDICINE

## 2022-11-27 PROCEDURE — 25010000002 HEPARIN (PORCINE) PER 1000 UNITS: Performed by: INTERNAL MEDICINE

## 2022-11-27 PROCEDURE — 84145 PROCALCITONIN (PCT): CPT | Performed by: INTERNAL MEDICINE

## 2022-11-27 PROCEDURE — 25010000002 CEFEPIME PER 500 MG: Performed by: INTERNAL MEDICINE

## 2022-11-27 PROCEDURE — 85610 PROTHROMBIN TIME: CPT | Performed by: INTERNAL MEDICINE

## 2022-11-27 PROCEDURE — 25010000002 FENTANYL CITRATE (PF) 2500 MCG/50ML SOLUTION: Performed by: EMERGENCY MEDICINE

## 2022-11-27 PROCEDURE — 25010000002 CEFTRIAXONE PER 250 MG: Performed by: INTERNAL MEDICINE

## 2022-11-27 PROCEDURE — 71045 X-RAY EXAM CHEST 1 VIEW: CPT

## 2022-11-27 PROCEDURE — 84100 ASSAY OF PHOSPHORUS: CPT | Performed by: INTERNAL MEDICINE

## 2022-11-27 PROCEDURE — 80202 ASSAY OF VANCOMYCIN: CPT | Performed by: INTERNAL MEDICINE

## 2022-11-27 RX ORDER — POTASSIUM CHLORIDE 750 MG/1
40 TABLET, FILM COATED, EXTENDED RELEASE ORAL AS NEEDED
Status: DISCONTINUED | OUTPATIENT
Start: 2022-11-27 | End: 2022-12-01 | Stop reason: HOSPADM

## 2022-11-27 RX ORDER — POTASSIUM CHLORIDE 7.45 MG/ML
10 INJECTION INTRAVENOUS
Status: DISCONTINUED | OUTPATIENT
Start: 2022-11-27 | End: 2022-12-01 | Stop reason: HOSPADM

## 2022-11-27 RX ORDER — ACETAMINOPHEN 325 MG/1
650 TABLET ORAL EVERY 6 HOURS PRN
Status: DISCONTINUED | OUTPATIENT
Start: 2022-11-27 | End: 2022-12-01 | Stop reason: HOSPADM

## 2022-11-27 RX ORDER — NOREPINEPHRINE BIT/0.9 % NACL 8 MG/250ML
.02-.3 INFUSION BOTTLE (ML) INTRAVENOUS
Status: DISCONTINUED | OUTPATIENT
Start: 2022-11-27 | End: 2022-11-28

## 2022-11-27 RX ORDER — POTASSIUM CHLORIDE 1.5 G/1.77G
40 POWDER, FOR SOLUTION ORAL AS NEEDED
Status: DISCONTINUED | OUTPATIENT
Start: 2022-11-27 | End: 2022-12-01 | Stop reason: HOSPADM

## 2022-11-27 RX ADMIN — DEXMEDETOMIDINE HYDROCHLORIDE 1 MCG/KG/HR: 4 INJECTION, SOLUTION INTRAVENOUS at 07:16

## 2022-11-27 RX ADMIN — HEPARIN SODIUM 5000 UNITS: 5000 INJECTION INTRAVENOUS; SUBCUTANEOUS at 05:40

## 2022-11-27 RX ADMIN — GABAPENTIN 100 MG: 100 CAPSULE ORAL at 20:37

## 2022-11-27 RX ADMIN — CEFTRIAXONE SODIUM 2 G: 2 INJECTION, POWDER, FOR SOLUTION INTRAMUSCULAR; INTRAVENOUS at 16:00

## 2022-11-27 RX ADMIN — FENTANYL CITRATE 100 MCG/HR: 50 INJECTION, SOLUTION INTRAMUSCULAR; INTRAVENOUS at 07:16

## 2022-11-27 RX ADMIN — HYDROCODONE BITARTRATE AND ACETAMINOPHEN 1 TABLET: 7.5; 325 TABLET ORAL at 14:11

## 2022-11-27 RX ADMIN — ACETAMINOPHEN 650 MG: 325 TABLET, FILM COATED ORAL at 14:09

## 2022-11-27 RX ADMIN — FAMOTIDINE 20 MG: 10 INJECTION INTRAVENOUS at 08:35

## 2022-11-27 RX ADMIN — Medication 10 ML: at 08:14

## 2022-11-27 RX ADMIN — ROPINIROLE HYDROCHLORIDE 0.5 MG: 0.5 TABLET, FILM COATED ORAL at 20:37

## 2022-11-27 RX ADMIN — HEPARIN SODIUM 5000 UNITS: 5000 INJECTION INTRAVENOUS; SUBCUTANEOUS at 14:03

## 2022-11-27 RX ADMIN — GABAPENTIN 100 MG: 100 CAPSULE ORAL at 08:35

## 2022-11-27 RX ADMIN — SODIUM CHLORIDE 75 ML/HR: 9 INJECTION, SOLUTION INTRAVENOUS at 01:17

## 2022-11-27 RX ADMIN — SODIUM CHLORIDE 1000 ML: 9 INJECTION, SOLUTION INTRAVENOUS at 16:28

## 2022-11-27 RX ADMIN — Medication 0.02 MCG/KG/MIN: at 16:34

## 2022-11-27 RX ADMIN — CEFEPIME 2 G: 2 INJECTION, POWDER, FOR SOLUTION INTRAVENOUS at 04:20

## 2022-11-27 RX ADMIN — DEXMEDETOMIDINE HYDROCHLORIDE 1 MCG/KG/HR: 4 INJECTION, SOLUTION INTRAVENOUS at 01:16

## 2022-11-27 RX ADMIN — HEPARIN SODIUM 5000 UNITS: 5000 INJECTION INTRAVENOUS; SUBCUTANEOUS at 21:24

## 2022-11-27 RX ADMIN — Medication 10 ML: at 21:24

## 2022-11-28 LAB
ANION GAP SERPL CALCULATED.3IONS-SCNC: 10.4 MMOL/L (ref 5–15)
BASOPHILS # BLD AUTO: 0.03 10*3/MM3 (ref 0–0.2)
BASOPHILS NFR BLD AUTO: 0.2 % (ref 0–1.5)
BUN SERPL-MCNC: 21 MG/DL (ref 8–23)
BUN/CREAT SERPL: 24.7 (ref 7–25)
CALCIUM SPEC-SCNC: 8.6 MG/DL (ref 8.6–10.5)
CHLORIDE SERPL-SCNC: 109 MMOL/L (ref 98–107)
CO2 SERPL-SCNC: 22.6 MMOL/L (ref 22–29)
CREAT SERPL-MCNC: 0.85 MG/DL (ref 0.76–1.27)
DEPRECATED RDW RBC AUTO: 41.9 FL (ref 37–54)
EGFRCR SERPLBLD CKD-EPI 2021: 85.7 ML/MIN/1.73
EOSINOPHIL # BLD AUTO: 0.23 10*3/MM3 (ref 0–0.4)
EOSINOPHIL NFR BLD AUTO: 1.6 % (ref 0.3–6.2)
ERYTHROCYTE [DISTWIDTH] IN BLOOD BY AUTOMATED COUNT: 11.8 % (ref 12.3–15.4)
GLUCOSE BLDC GLUCOMTR-MCNC: 73 MG/DL (ref 70–130)
GLUCOSE SERPL-MCNC: 88 MG/DL (ref 65–99)
HCT VFR BLD AUTO: 37.3 % (ref 37.5–51)
HGB BLD-MCNC: 12.5 G/DL (ref 13–17.7)
IMM GRANULOCYTES # BLD AUTO: 0.09 10*3/MM3 (ref 0–0.05)
IMM GRANULOCYTES NFR BLD AUTO: 0.6 % (ref 0–0.5)
LYMPHOCYTES # BLD AUTO: 1.08 10*3/MM3 (ref 0.7–3.1)
LYMPHOCYTES NFR BLD AUTO: 7.7 % (ref 19.6–45.3)
MCH RBC QN AUTO: 32.6 PG (ref 26.6–33)
MCHC RBC AUTO-ENTMCNC: 33.5 G/DL (ref 31.5–35.7)
MCV RBC AUTO: 97.4 FL (ref 79–97)
MONOCYTES # BLD AUTO: 1.11 10*3/MM3 (ref 0.1–0.9)
MONOCYTES NFR BLD AUTO: 7.9 % (ref 5–12)
NEUTROPHILS NFR BLD AUTO: 11.53 10*3/MM3 (ref 1.7–7)
NEUTROPHILS NFR BLD AUTO: 82 % (ref 42.7–76)
NRBC BLD AUTO-RTO: 0 /100 WBC (ref 0–0.2)
PLATELET # BLD AUTO: 187 10*3/MM3 (ref 140–450)
PMV BLD AUTO: 9.4 FL (ref 6–12)
POTASSIUM SERPL-SCNC: 3.8 MMOL/L (ref 3.5–5.2)
QT INTERVAL: 444 MS
RBC # BLD AUTO: 3.83 10*6/MM3 (ref 4.14–5.8)
SODIUM SERPL-SCNC: 142 MMOL/L (ref 136–145)
WBC NRBC COR # BLD: 14.07 10*3/MM3 (ref 3.4–10.8)

## 2022-11-28 PROCEDURE — 25010000002 METHYLPREDNISOLONE PER 125 MG: Performed by: INTERNAL MEDICINE

## 2022-11-28 PROCEDURE — 92610 EVALUATE SWALLOWING FUNCTION: CPT

## 2022-11-28 PROCEDURE — 25010000002 HEPARIN (PORCINE) PER 1000 UNITS: Performed by: INTERNAL MEDICINE

## 2022-11-28 PROCEDURE — 97110 THERAPEUTIC EXERCISES: CPT

## 2022-11-28 PROCEDURE — 25010000002 CEFTRIAXONE PER 250 MG: Performed by: INTERNAL MEDICINE

## 2022-11-28 PROCEDURE — 80048 BASIC METABOLIC PNL TOTAL CA: CPT | Performed by: INTERNAL MEDICINE

## 2022-11-28 PROCEDURE — 85025 COMPLETE CBC W/AUTO DIFF WBC: CPT | Performed by: INTERNAL MEDICINE

## 2022-11-28 PROCEDURE — 97162 PT EVAL MOD COMPLEX 30 MIN: CPT

## 2022-11-28 PROCEDURE — 82962 GLUCOSE BLOOD TEST: CPT

## 2022-11-28 RX ORDER — DIPHENHYDRAMINE HYDROCHLORIDE, ZINC ACETATE 2; .1 G/100G; G/100G
1 CREAM TOPICAL 3 TIMES DAILY PRN
Status: DISCONTINUED | OUTPATIENT
Start: 2022-11-28 | End: 2022-12-01 | Stop reason: HOSPADM

## 2022-11-28 RX ORDER — METHYLPREDNISOLONE SODIUM SUCCINATE 125 MG/2ML
60 INJECTION, POWDER, LYOPHILIZED, FOR SOLUTION INTRAMUSCULAR; INTRAVENOUS ONCE
Status: COMPLETED | OUTPATIENT
Start: 2022-11-28 | End: 2022-11-28

## 2022-11-28 RX ADMIN — Medication 10 ML: at 20:02

## 2022-11-28 RX ADMIN — Medication 10 ML: at 08:22

## 2022-11-28 RX ADMIN — METHYLPREDNISOLONE SODIUM SUCCINATE 60 MG: 125 INJECTION, POWDER, FOR SOLUTION INTRAMUSCULAR; INTRAVENOUS at 19:12

## 2022-11-28 RX ADMIN — HEPARIN SODIUM 5000 UNITS: 5000 INJECTION INTRAVENOUS; SUBCUTANEOUS at 22:05

## 2022-11-28 RX ADMIN — HEPARIN SODIUM 5000 UNITS: 5000 INJECTION INTRAVENOUS; SUBCUTANEOUS at 14:34

## 2022-11-28 RX ADMIN — GABAPENTIN 100 MG: 100 CAPSULE ORAL at 08:22

## 2022-11-28 RX ADMIN — HEPARIN SODIUM 5000 UNITS: 5000 INJECTION INTRAVENOUS; SUBCUTANEOUS at 05:54

## 2022-11-28 RX ADMIN — HYDROCODONE BITARTRATE AND ACETAMINOPHEN 1 TABLET: 7.5; 325 TABLET ORAL at 00:50

## 2022-11-28 RX ADMIN — FAMOTIDINE 20 MG: 10 INJECTION INTRAVENOUS at 08:22

## 2022-11-29 ENCOUNTER — APPOINTMENT (OUTPATIENT)
Dept: GENERAL RADIOLOGY | Facility: HOSPITAL | Age: 82
End: 2022-11-29

## 2022-11-29 LAB
ANION GAP SERPL CALCULATED.3IONS-SCNC: 14 MMOL/L (ref 5–15)
BASOPHILS # BLD AUTO: 0.01 10*3/MM3 (ref 0–0.2)
BASOPHILS NFR BLD AUTO: 0.1 % (ref 0–1.5)
BUN SERPL-MCNC: 16 MG/DL (ref 8–23)
BUN/CREAT SERPL: 19.8 (ref 7–25)
CALCIUM SPEC-SCNC: 8.7 MG/DL (ref 8.6–10.5)
CHLORIDE SERPL-SCNC: 106 MMOL/L (ref 98–107)
CO2 SERPL-SCNC: 21 MMOL/L (ref 22–29)
CREAT SERPL-MCNC: 0.81 MG/DL (ref 0.76–1.27)
DEPRECATED RDW RBC AUTO: 40.9 FL (ref 37–54)
EGFRCR SERPLBLD CKD-EPI 2021: 86.9 ML/MIN/1.73
EOSINOPHIL # BLD AUTO: 0 10*3/MM3 (ref 0–0.4)
EOSINOPHIL NFR BLD AUTO: 0 % (ref 0.3–6.2)
ERYTHROCYTE [DISTWIDTH] IN BLOOD BY AUTOMATED COUNT: 11.7 % (ref 12.3–15.4)
GLUCOSE SERPL-MCNC: 110 MG/DL (ref 65–99)
HCT VFR BLD AUTO: 35 % (ref 37.5–51)
HGB BLD-MCNC: 11.8 G/DL (ref 13–17.7)
IMM GRANULOCYTES # BLD AUTO: 0.03 10*3/MM3 (ref 0–0.05)
IMM GRANULOCYTES NFR BLD AUTO: 0.3 % (ref 0–0.5)
LYMPHOCYTES # BLD AUTO: 0.87 10*3/MM3 (ref 0.7–3.1)
LYMPHOCYTES NFR BLD AUTO: 9.7 % (ref 19.6–45.3)
MCH RBC QN AUTO: 32.6 PG (ref 26.6–33)
MCHC RBC AUTO-ENTMCNC: 33.7 G/DL (ref 31.5–35.7)
MCV RBC AUTO: 96.7 FL (ref 79–97)
MONOCYTES # BLD AUTO: 0.08 10*3/MM3 (ref 0.1–0.9)
MONOCYTES NFR BLD AUTO: 0.9 % (ref 5–12)
NEUTROPHILS NFR BLD AUTO: 8 10*3/MM3 (ref 1.7–7)
NEUTROPHILS NFR BLD AUTO: 89 % (ref 42.7–76)
NRBC BLD AUTO-RTO: 0 /100 WBC (ref 0–0.2)
PLATELET # BLD AUTO: 187 10*3/MM3 (ref 140–450)
PMV BLD AUTO: 9.4 FL (ref 6–12)
POTASSIUM SERPL-SCNC: 3.9 MMOL/L (ref 3.5–5.2)
RBC # BLD AUTO: 3.62 10*6/MM3 (ref 4.14–5.8)
SODIUM SERPL-SCNC: 141 MMOL/L (ref 136–145)
WBC NRBC COR # BLD: 8.99 10*3/MM3 (ref 3.4–10.8)

## 2022-11-29 PROCEDURE — 85025 COMPLETE CBC W/AUTO DIFF WBC: CPT | Performed by: INTERNAL MEDICINE

## 2022-11-29 PROCEDURE — 25010000002 CEFTRIAXONE PER 250 MG: Performed by: INTERNAL MEDICINE

## 2022-11-29 PROCEDURE — 25010000002 HEPARIN (PORCINE) PER 1000 UNITS: Performed by: INTERNAL MEDICINE

## 2022-11-29 PROCEDURE — 74230 X-RAY XM SWLNG FUNCJ C+: CPT

## 2022-11-29 PROCEDURE — 92611 MOTION FLUOROSCOPY/SWALLOW: CPT

## 2022-11-29 PROCEDURE — 80048 BASIC METABOLIC PNL TOTAL CA: CPT | Performed by: INTERNAL MEDICINE

## 2022-11-29 RX ADMIN — DOCUSATE SODIUM 50MG AND SENNOSIDES 8.6MG 2 TABLET: 8.6; 5 TABLET, FILM COATED ORAL at 20:15

## 2022-11-29 RX ADMIN — ROPINIROLE HYDROCHLORIDE 0.5 MG: 0.5 TABLET, FILM COATED ORAL at 20:15

## 2022-11-29 RX ADMIN — Medication 10 ML: at 20:15

## 2022-11-29 RX ADMIN — BARIUM SULFATE 1 TEASPOON(S): 0.6 CREAM ORAL at 08:20

## 2022-11-29 RX ADMIN — BARIUM SULFATE 4 ML: 980 POWDER, FOR SUSPENSION ORAL at 08:20

## 2022-11-29 RX ADMIN — HEPARIN SODIUM 5000 UNITS: 5000 INJECTION INTRAVENOUS; SUBCUTANEOUS at 05:42

## 2022-11-29 RX ADMIN — BARIUM SULFATE 55 ML: 0.81 POWDER, FOR SUSPENSION ORAL at 08:20

## 2022-11-29 RX ADMIN — BARIUM SULFATE 50 ML: 400 SUSPENSION ORAL at 08:20

## 2022-11-29 RX ADMIN — GABAPENTIN 100 MG: 100 CAPSULE ORAL at 20:15

## 2022-11-29 RX ADMIN — HEPARIN SODIUM 5000 UNITS: 5000 INJECTION INTRAVENOUS; SUBCUTANEOUS at 14:57

## 2022-11-29 RX ADMIN — FAMOTIDINE 20 MG: 10 INJECTION INTRAVENOUS at 09:54

## 2022-11-29 RX ADMIN — CEFTRIAXONE SODIUM 2 G: 2 INJECTION, POWDER, FOR SOLUTION INTRAMUSCULAR; INTRAVENOUS at 16:35

## 2022-11-29 RX ADMIN — HEPARIN SODIUM 5000 UNITS: 5000 INJECTION INTRAVENOUS; SUBCUTANEOUS at 21:57

## 2022-11-29 RX ADMIN — Medication 10 ML: at 09:54

## 2022-11-30 PROBLEM — A41.9 SEPTIC SHOCK: Status: RESOLVED | Noted: 2022-11-26 | Resolved: 2022-11-30

## 2022-11-30 PROBLEM — R65.21 SEPTIC SHOCK: Status: RESOLVED | Noted: 2022-11-26 | Resolved: 2022-11-30

## 2022-11-30 LAB
ANION GAP SERPL CALCULATED.3IONS-SCNC: 7.4 MMOL/L (ref 5–15)
BASOPHILS # BLD AUTO: 0.01 10*3/MM3 (ref 0–0.2)
BASOPHILS NFR BLD AUTO: 0.1 % (ref 0–1.5)
BUN SERPL-MCNC: 22 MG/DL (ref 8–23)
BUN/CREAT SERPL: 29.7 (ref 7–25)
CALCIUM SPEC-SCNC: 8.2 MG/DL (ref 8.6–10.5)
CHLORIDE SERPL-SCNC: 111 MMOL/L (ref 98–107)
CO2 SERPL-SCNC: 24.6 MMOL/L (ref 22–29)
CREAT SERPL-MCNC: 0.74 MG/DL (ref 0.76–1.27)
DEPRECATED RDW RBC AUTO: 42.2 FL (ref 37–54)
EGFRCR SERPLBLD CKD-EPI 2021: 89.3 ML/MIN/1.73
EOSINOPHIL # BLD AUTO: 0 10*3/MM3 (ref 0–0.4)
EOSINOPHIL NFR BLD AUTO: 0 % (ref 0.3–6.2)
ERYTHROCYTE [DISTWIDTH] IN BLOOD BY AUTOMATED COUNT: 11.8 % (ref 12.3–15.4)
GLUCOSE SERPL-MCNC: 136 MG/DL (ref 65–99)
HCT VFR BLD AUTO: 30 % (ref 37.5–51)
HGB BLD-MCNC: 10 G/DL (ref 13–17.7)
IMM GRANULOCYTES # BLD AUTO: 0.06 10*3/MM3 (ref 0–0.05)
IMM GRANULOCYTES NFR BLD AUTO: 0.5 % (ref 0–0.5)
LYMPHOCYTES # BLD AUTO: 1.24 10*3/MM3 (ref 0.7–3.1)
LYMPHOCYTES NFR BLD AUTO: 11.1 % (ref 19.6–45.3)
MCH RBC QN AUTO: 32.5 PG (ref 26.6–33)
MCHC RBC AUTO-ENTMCNC: 33.3 G/DL (ref 31.5–35.7)
MCV RBC AUTO: 97.4 FL (ref 79–97)
MONOCYTES # BLD AUTO: 0.73 10*3/MM3 (ref 0.1–0.9)
MONOCYTES NFR BLD AUTO: 6.5 % (ref 5–12)
NEUTROPHILS NFR BLD AUTO: 81.8 % (ref 42.7–76)
NEUTROPHILS NFR BLD AUTO: 9.12 10*3/MM3 (ref 1.7–7)
NRBC BLD AUTO-RTO: 0 /100 WBC (ref 0–0.2)
PLATELET # BLD AUTO: 176 10*3/MM3 (ref 140–450)
PMV BLD AUTO: 9.6 FL (ref 6–12)
POTASSIUM SERPL-SCNC: 4 MMOL/L (ref 3.5–5.2)
RBC # BLD AUTO: 3.08 10*6/MM3 (ref 4.14–5.8)
SODIUM SERPL-SCNC: 143 MMOL/L (ref 136–145)
WBC NRBC COR # BLD: 11.16 10*3/MM3 (ref 3.4–10.8)

## 2022-11-30 PROCEDURE — 25010000002 HEPARIN (PORCINE) PER 1000 UNITS: Performed by: INTERNAL MEDICINE

## 2022-11-30 PROCEDURE — 85025 COMPLETE CBC W/AUTO DIFF WBC: CPT | Performed by: INTERNAL MEDICINE

## 2022-11-30 PROCEDURE — 97116 GAIT TRAINING THERAPY: CPT

## 2022-11-30 PROCEDURE — 25010000002 CEFTRIAXONE PER 250 MG: Performed by: INTERNAL MEDICINE

## 2022-11-30 PROCEDURE — 80048 BASIC METABOLIC PNL TOTAL CA: CPT | Performed by: INTERNAL MEDICINE

## 2022-11-30 RX ORDER — CEFDINIR 300 MG/1
300 CAPSULE ORAL 2 TIMES DAILY
Qty: 6 CAPSULE | Refills: 0 | Status: SHIPPED | OUTPATIENT
Start: 2022-11-30 | End: 2022-12-03

## 2022-11-30 RX ORDER — GABAPENTIN 100 MG/1
100 CAPSULE ORAL EVERY 12 HOURS SCHEDULED
Qty: 20 CAPSULE | Refills: 0 | Status: SHIPPED | OUTPATIENT
Start: 2022-11-30 | End: 2022-12-14 | Stop reason: SDUPTHER

## 2022-11-30 RX ORDER — FAMOTIDINE 20 MG/1
20 TABLET, FILM COATED ORAL DAILY
Status: DISCONTINUED | OUTPATIENT
Start: 2022-12-01 | End: 2022-12-01 | Stop reason: HOSPADM

## 2022-11-30 RX ADMIN — Medication 10 ML: at 20:11

## 2022-11-30 RX ADMIN — FAMOTIDINE 20 MG: 10 INJECTION INTRAVENOUS at 09:34

## 2022-11-30 RX ADMIN — HEPARIN SODIUM 5000 UNITS: 5000 INJECTION INTRAVENOUS; SUBCUTANEOUS at 13:39

## 2022-11-30 RX ADMIN — CEFTRIAXONE SODIUM 2 G: 2 INJECTION, POWDER, FOR SOLUTION INTRAMUSCULAR; INTRAVENOUS at 16:51

## 2022-11-30 RX ADMIN — GABAPENTIN 100 MG: 100 CAPSULE ORAL at 20:10

## 2022-11-30 RX ADMIN — HEPARIN SODIUM 5000 UNITS: 5000 INJECTION INTRAVENOUS; SUBCUTANEOUS at 05:45

## 2022-11-30 RX ADMIN — HEPARIN SODIUM 5000 UNITS: 5000 INJECTION INTRAVENOUS; SUBCUTANEOUS at 21:20

## 2022-11-30 RX ADMIN — ROPINIROLE HYDROCHLORIDE 0.5 MG: 0.5 TABLET, FILM COATED ORAL at 20:11

## 2022-11-30 RX ADMIN — Medication 10 ML: at 09:34

## 2022-11-30 RX ADMIN — DOCUSATE SODIUM 50MG AND SENNOSIDES 8.6MG 2 TABLET: 8.6; 5 TABLET, FILM COATED ORAL at 20:10

## 2022-11-30 RX ADMIN — GABAPENTIN 100 MG: 100 CAPSULE ORAL at 09:34

## 2022-12-01 VITALS
BODY MASS INDEX: 28.18 KG/M2 | DIASTOLIC BLOOD PRESSURE: 73 MMHG | OXYGEN SATURATION: 93 % | HEIGHT: 71 IN | HEART RATE: 58 BPM | TEMPERATURE: 97.8 F | SYSTOLIC BLOOD PRESSURE: 136 MMHG | WEIGHT: 201.28 LBS | RESPIRATION RATE: 18 BRPM

## 2022-12-01 LAB
ANION GAP SERPL CALCULATED.3IONS-SCNC: 5.3 MMOL/L (ref 5–15)
BACTERIA SPEC AEROBE CULT: NORMAL
BACTERIA SPEC AEROBE CULT: NORMAL
BASOPHILS # BLD AUTO: 0.03 10*3/MM3 (ref 0–0.2)
BASOPHILS NFR BLD AUTO: 0.4 % (ref 0–1.5)
BUN SERPL-MCNC: 17 MG/DL (ref 8–23)
BUN/CREAT SERPL: 25 (ref 7–25)
CALCIUM SPEC-SCNC: 8.2 MG/DL (ref 8.6–10.5)
CHLORIDE SERPL-SCNC: 107 MMOL/L (ref 98–107)
CO2 SERPL-SCNC: 28.7 MMOL/L (ref 22–29)
CREAT SERPL-MCNC: 0.68 MG/DL (ref 0.76–1.27)
DEPRECATED RDW RBC AUTO: 42 FL (ref 37–54)
EGFRCR SERPLBLD CKD-EPI 2021: 91.7 ML/MIN/1.73
EOSINOPHIL # BLD AUTO: 0.21 10*3/MM3 (ref 0–0.4)
EOSINOPHIL NFR BLD AUTO: 2.7 % (ref 0.3–6.2)
ERYTHROCYTE [DISTWIDTH] IN BLOOD BY AUTOMATED COUNT: 11.7 % (ref 12.3–15.4)
GLUCOSE SERPL-MCNC: 88 MG/DL (ref 65–99)
HCT VFR BLD AUTO: 33.5 % (ref 37.5–51)
HGB BLD-MCNC: 11.1 G/DL (ref 13–17.7)
IMM GRANULOCYTES # BLD AUTO: 0.07 10*3/MM3 (ref 0–0.05)
IMM GRANULOCYTES NFR BLD AUTO: 0.9 % (ref 0–0.5)
LYMPHOCYTES # BLD AUTO: 2.43 10*3/MM3 (ref 0.7–3.1)
LYMPHOCYTES NFR BLD AUTO: 30.8 % (ref 19.6–45.3)
MCH RBC QN AUTO: 32.6 PG (ref 26.6–33)
MCHC RBC AUTO-ENTMCNC: 33.1 G/DL (ref 31.5–35.7)
MCV RBC AUTO: 98.2 FL (ref 79–97)
MONOCYTES # BLD AUTO: 0.78 10*3/MM3 (ref 0.1–0.9)
MONOCYTES NFR BLD AUTO: 9.9 % (ref 5–12)
NEUTROPHILS NFR BLD AUTO: 4.38 10*3/MM3 (ref 1.7–7)
NEUTROPHILS NFR BLD AUTO: 55.3 % (ref 42.7–76)
NRBC BLD AUTO-RTO: 0.1 /100 WBC (ref 0–0.2)
PLATELET # BLD AUTO: 213 10*3/MM3 (ref 140–450)
PMV BLD AUTO: 9.5 FL (ref 6–12)
POTASSIUM SERPL-SCNC: 3.9 MMOL/L (ref 3.5–5.2)
RBC # BLD AUTO: 3.41 10*6/MM3 (ref 4.14–5.8)
SODIUM SERPL-SCNC: 141 MMOL/L (ref 136–145)
WBC NRBC COR # BLD: 7.9 10*3/MM3 (ref 3.4–10.8)

## 2022-12-01 PROCEDURE — 80048 BASIC METABOLIC PNL TOTAL CA: CPT | Performed by: INTERNAL MEDICINE

## 2022-12-01 PROCEDURE — 25010000002 HEPARIN (PORCINE) PER 1000 UNITS: Performed by: INTERNAL MEDICINE

## 2022-12-01 PROCEDURE — 85025 COMPLETE CBC W/AUTO DIFF WBC: CPT | Performed by: INTERNAL MEDICINE

## 2022-12-01 RX ADMIN — HEPARIN SODIUM 5000 UNITS: 5000 INJECTION INTRAVENOUS; SUBCUTANEOUS at 05:51

## 2022-12-01 NOTE — PLAN OF CARE
Goal Outcome Evaluation:              Outcome Evaluation: pt being discharged to [facility] in the am. ambulance is transferring. VSS, Will continue to monitor rest of shift.

## 2022-12-01 NOTE — NURSING NOTE
Report called to RONEN Silva printed and sent with  EMS along with packet from CCP, IV removed and monitor removed.

## 2022-12-01 NOTE — CASE MANAGEMENT/SOCIAL WORK
Case Management Discharge Note      Final Note: [facility] via [facility] ems. anival rowan/ccp    Provided Post Acute Provider List?: N/A    Selected Continued Care - Discharged on 12/1/2022 Admission date: 11/26/2022 - Discharge disposition: Skilled Nursing Facility (DC - External)    Destination Coordination complete.    Service Provider Selected Services Address Phone Fax Patient Preferred    [facility] [address] [facility] [address] [phone] --          Durable Medical Equipment    No services have been selected for the patient.              Dialysis/Infusion    No services have been selected for the patient.              Home Medical Care    No services have been selected for the patient.              Therapy    No services have been selected for the patient.              Community Resources    No services have been selected for the patient.              Community & DME    No services have been selected for the patient.                Selected Continued Care - Prior Encounters Includes continued care and service providers with selected services from prior encounters from 8/28/2022 to 12/1/2022    Discharged on 11/23/2022 Admission date: 11/21/2022 - Discharge disposition: Skilled Nursing Facility (DC - External)    Destination     Service Provider Selected Services Address Phone Fax Patient Preferred    [facility] [address] [facility] [address] [phone] --                Discharged on 11/19/2022 Admission date: 11/13/2022 - Discharge disposition: [place] [facility]    Home Medical Care     Service Provider Selected Services Address Phone Fax Patient Preferred    [facility] [address] [id number] [phone] --                    Transportation Services  Ambulance: [facility]    Final Discharge  Disposition Code: 03 - skilled nursing facility (SNF)

## 2022-12-02 NOTE — PROGRESS NOTES
"Enter Query Response Below      Query Response:     Aspiration pneumonia    Electronically signed by Cortez Bob MD, 22, 10:51 AM EST.           If applicable, please update the problem list.        Patient: Tony Leonard        : 1940  Account: [id number]           Admit Date:         How to Respond to this query:       a. Click New Note     b. Answer query within the yellow box.                c. Update the Problem List, if applicable.      If you have any questions about this query contact me at: [email or web address]    Dr. Bob,     Patient admitted with sepsis/shock, respiratory failure, BRODERICK.  CXR shows non-specific basilar consolidation, procalcitonin 1.74, WBC 26.  Per H&P \"some mild infiltrates on the left side could indicate pneumonia\"  and \"Give IV vancomycin and cefepime for possible pneumonia.  This seems to be the source of sepsis at this time.\"   Treatment: IV vancomycin (1 day), cefepime (2 days), rocephin (4 days) and discharged on omnicef for 3 days.     Please clarify if the patient is treated/monitored for the following:  -pneumonia (please specify type/suspected type)_____________  -pneumonia ruled out  -other___________  -unable to clinically determine     By submitting this query, we are merely seeking further clarification of documentation to accurately reflect all conditions that you are monitoring, evaluating, treating or that extend the hospitalization or utilize additional resources of care. Please utilize your independent clinical judgment when addressing the question(s) above.     This query and your response, once completed, will be entered into the legal medical record.    Sincerely,  Nila Grissom RN BSN  Clinical Documentation Integrity Program     "

## 2022-12-09 ENCOUNTER — HOSPITAL ENCOUNTER (INPATIENT)
Facility: HOSPITAL | Age: 82
LOS: 2 days | Discharge: HOME HEALTH | DRG: 175 | End: 2022-12-12
Attending: EMERGENCY MEDICINE | Admitting: HOSPITALIST
Payer: MEDICARE

## 2022-12-09 ENCOUNTER — APPOINTMENT (OUTPATIENT)
Dept: GENERAL RADIOLOGY | Facility: HOSPITAL | Age: 82
DRG: 175 | End: 2022-12-09
Payer: MEDICARE

## 2022-12-09 DIAGNOSIS — M25.551 BILATERAL HIP PAIN: ICD-10-CM

## 2022-12-09 DIAGNOSIS — J44.1 ACUTE EXACERBATION OF CHRONIC OBSTRUCTIVE PULMONARY DISEASE (COPD): Primary | ICD-10-CM

## 2022-12-09 DIAGNOSIS — R77.8 ELEVATED TROPONIN: ICD-10-CM

## 2022-12-09 DIAGNOSIS — M25.552 BILATERAL HIP PAIN: ICD-10-CM

## 2022-12-09 DIAGNOSIS — I26.92 ACUTE SADDLE PULMONARY EMBOLISM WITHOUT ACUTE COR PULMONALE: ICD-10-CM

## 2022-12-09 LAB
BASOPHILS # BLD AUTO: 0.07 10*3/MM3 (ref 0–0.2)
BASOPHILS NFR BLD AUTO: 0.7 % (ref 0–1.5)
DEPRECATED RDW RBC AUTO: 43.3 FL (ref 37–54)
EOSINOPHIL # BLD AUTO: 0.65 10*3/MM3 (ref 0–0.4)
EOSINOPHIL NFR BLD AUTO: 6.4 % (ref 0.3–6.2)
ERYTHROCYTE [DISTWIDTH] IN BLOOD BY AUTOMATED COUNT: 12.3 % (ref 12.3–15.4)
HCT VFR BLD AUTO: 36.2 % (ref 37.5–51)
HGB BLD-MCNC: 11.9 G/DL (ref 13–17.7)
IMM GRANULOCYTES # BLD AUTO: 0.05 10*3/MM3 (ref 0–0.05)
IMM GRANULOCYTES NFR BLD AUTO: 0.5 % (ref 0–0.5)
LYMPHOCYTES # BLD AUTO: 1.6 10*3/MM3 (ref 0.7–3.1)
LYMPHOCYTES NFR BLD AUTO: 15.6 % (ref 19.6–45.3)
MCH RBC QN AUTO: 31.9 PG (ref 26.6–33)
MCHC RBC AUTO-ENTMCNC: 32.9 G/DL (ref 31.5–35.7)
MCV RBC AUTO: 97.1 FL (ref 79–97)
MONOCYTES # BLD AUTO: 1.4 10*3/MM3 (ref 0.1–0.9)
MONOCYTES NFR BLD AUTO: 13.7 % (ref 5–12)
NEUTROPHILS NFR BLD AUTO: 6.46 10*3/MM3 (ref 1.7–7)
NEUTROPHILS NFR BLD AUTO: 63.1 % (ref 42.7–76)
NRBC BLD AUTO-RTO: 0 /100 WBC (ref 0–0.2)
PLATELET # BLD AUTO: 317 10*3/MM3 (ref 140–450)
PMV BLD AUTO: 9 FL (ref 6–12)
RBC # BLD AUTO: 3.73 10*6/MM3 (ref 4.14–5.8)
WBC NRBC COR # BLD: 10.23 10*3/MM3 (ref 3.4–10.8)

## 2022-12-09 PROCEDURE — 87040 BLOOD CULTURE FOR BACTERIA: CPT | Performed by: EMERGENCY MEDICINE

## 2022-12-09 PROCEDURE — 87150 DNA/RNA AMPLIFIED PROBE: CPT | Performed by: EMERGENCY MEDICINE

## 2022-12-09 PROCEDURE — 87147 CULTURE TYPE IMMUNOLOGIC: CPT | Performed by: EMERGENCY MEDICINE

## 2022-12-09 PROCEDURE — 71045 X-RAY EXAM CHEST 1 VIEW: CPT

## 2022-12-09 PROCEDURE — 94799 UNLISTED PULMONARY SVC/PX: CPT

## 2022-12-09 PROCEDURE — 84484 ASSAY OF TROPONIN QUANT: CPT | Performed by: EMERGENCY MEDICINE

## 2022-12-09 PROCEDURE — 93010 ELECTROCARDIOGRAM REPORT: CPT | Performed by: INTERNAL MEDICINE

## 2022-12-09 PROCEDURE — 85025 COMPLETE CBC W/AUTO DIFF WBC: CPT | Performed by: EMERGENCY MEDICINE

## 2022-12-09 PROCEDURE — 83605 ASSAY OF LACTIC ACID: CPT | Performed by: EMERGENCY MEDICINE

## 2022-12-09 PROCEDURE — 99285 EMERGENCY DEPT VISIT HI MDM: CPT

## 2022-12-09 PROCEDURE — 80053 COMPREHEN METABOLIC PANEL: CPT | Performed by: EMERGENCY MEDICINE

## 2022-12-09 PROCEDURE — 84145 PROCALCITONIN (PCT): CPT | Performed by: EMERGENCY MEDICINE

## 2022-12-09 PROCEDURE — 93005 ELECTROCARDIOGRAM TRACING: CPT | Performed by: EMERGENCY MEDICINE

## 2022-12-09 PROCEDURE — 0202U NFCT DS 22 TRGT SARS-COV-2: CPT | Performed by: EMERGENCY MEDICINE

## 2022-12-09 PROCEDURE — 36415 COLL VENOUS BLD VENIPUNCTURE: CPT

## 2022-12-09 PROCEDURE — 83880 ASSAY OF NATRIURETIC PEPTIDE: CPT | Performed by: EMERGENCY MEDICINE

## 2022-12-09 RX ORDER — ALBUTEROL SULFATE 2.5 MG/3ML
2.5 SOLUTION RESPIRATORY (INHALATION) ONCE
Status: COMPLETED | OUTPATIENT
Start: 2022-12-09 | End: 2022-12-09

## 2022-12-09 RX ADMIN — ALBUTEROL SULFATE 2.5 MG: 2.5 SOLUTION RESPIRATORY (INHALATION) at 23:50

## 2022-12-10 ENCOUNTER — APPOINTMENT (OUTPATIENT)
Dept: CARDIOLOGY | Facility: HOSPITAL | Age: 82
DRG: 175 | End: 2022-12-10
Payer: MEDICARE

## 2022-12-10 ENCOUNTER — APPOINTMENT (OUTPATIENT)
Dept: CT IMAGING | Facility: HOSPITAL | Age: 82
DRG: 175 | End: 2022-12-10
Payer: MEDICARE

## 2022-12-10 PROBLEM — I21.4 NSTEMI (NON-ST ELEVATED MYOCARDIAL INFARCTION): Status: ACTIVE | Noted: 2022-12-10

## 2022-12-10 PROBLEM — G47.34 NOCTURNAL HYPOXIA: Status: ACTIVE | Noted: 2022-12-10

## 2022-12-10 LAB
ALBUMIN SERPL-MCNC: 3.6 G/DL (ref 3.5–5.2)
ALBUMIN/GLOB SERPL: 1.5 G/DL
ALP SERPL-CCNC: 81 U/L (ref 39–117)
ALT SERPL W P-5'-P-CCNC: 14 U/L (ref 1–41)
ANION GAP SERPL CALCULATED.3IONS-SCNC: 11.1 MMOL/L (ref 5–15)
AORTIC DIMENSIONLESS INDEX: 0.7 (DI)
ASCENDING AORTA: 4.1 CM
AST SERPL-CCNC: 13 U/L (ref 1–40)
B PARAPERT DNA SPEC QL NAA+PROBE: NOT DETECTED
B PERT DNA SPEC QL NAA+PROBE: NOT DETECTED
BH CV ECHO MEAS - AI P1/2T: 332.8 MSEC
BH CV ECHO MEAS - AO MAX PG: 12.7 MMHG
BH CV ECHO MEAS - AO MEAN PG: 5.9 MMHG
BH CV ECHO MEAS - AO V2 MAX: 178.1 CM/SEC
BH CV ECHO MEAS - AO V2 VTI: 25.3 CM
BH CV ECHO MEAS - AVA(I,D): 2.9 CM2
BH CV ECHO MEAS - EDV(CUBED): 67.9 ML
BH CV ECHO MEAS - EDV(MOD-SP2): 78 ML
BH CV ECHO MEAS - EDV(MOD-SP4): 84 ML
BH CV ECHO MEAS - EF(MOD-BP): 65 %
BH CV ECHO MEAS - EF(MOD-SP2): 61.5 %
BH CV ECHO MEAS - EF(MOD-SP4): 67.9 %
BH CV ECHO MEAS - ESV(CUBED): 24.3 ML
BH CV ECHO MEAS - ESV(MOD-SP2): 30 ML
BH CV ECHO MEAS - ESV(MOD-SP4): 27 ML
BH CV ECHO MEAS - FS: 29 %
BH CV ECHO MEAS - IVS/LVPW: 1.06 CM
BH CV ECHO MEAS - IVSD: 0.71 CM
BH CV ECHO MEAS - LAT PEAK E' VEL: 15.7 CM/SEC
BH CV ECHO MEAS - LV DIASTOLIC VOL/BSA (35-75): 42.1 CM2
BH CV ECHO MEAS - LV MASS(C)D: 79.4 GRAMS
BH CV ECHO MEAS - LV MAX PG: 5.7 MMHG
BH CV ECHO MEAS - LV MEAN PG: 2.7 MMHG
BH CV ECHO MEAS - LV SYSTOLIC VOL/BSA (12-30): 13.5 CM2
BH CV ECHO MEAS - LV V1 MAX: 119.2 CM/SEC
BH CV ECHO MEAS - LV V1 VTI: 18.7 CM
BH CV ECHO MEAS - LVIDD: 4.1 CM
BH CV ECHO MEAS - LVIDS: 2.9 CM
BH CV ECHO MEAS - LVOT AREA: 3.9 CM2
BH CV ECHO MEAS - LVOT DIAM: 2.24 CM
BH CV ECHO MEAS - LVPWD: 0.67 CM
BH CV ECHO MEAS - MED PEAK E' VEL: 10.2 CM/SEC
BH CV ECHO MEAS - MV A DUR: 0.15 SEC
BH CV ECHO MEAS - MV A MAX VEL: 122 CM/SEC
BH CV ECHO MEAS - MV DEC SLOPE: 625.2 CM/SEC2
BH CV ECHO MEAS - MV DEC TIME: 90 MSEC
BH CV ECHO MEAS - MV E MAX VEL: 62.4 CM/SEC
BH CV ECHO MEAS - MV E/A: 0.51
BH CV ECHO MEAS - MV MAX PG: 5.5 MMHG
BH CV ECHO MEAS - MV MEAN PG: 1.63 MMHG
BH CV ECHO MEAS - MV P1/2T: 28 MSEC
BH CV ECHO MEAS - MV V2 VTI: 15 CM
BH CV ECHO MEAS - MVA(P1/2T): 7.8 CM2
BH CV ECHO MEAS - MVA(VTI): 4.9 CM2
BH CV ECHO MEAS - PA ACC TIME: 0.11 SEC
BH CV ECHO MEAS - PA PR(ACCEL): 29.9 MMHG
BH CV ECHO MEAS - PA V2 MAX: 62.7 CM/SEC
BH CV ECHO MEAS - PI END-D VEL: 137.3 CM/SEC
BH CV ECHO MEAS - PULM A REVS DUR: 0.16 SEC
BH CV ECHO MEAS - PULM A REVS VEL: 45.5 CM/SEC
BH CV ECHO MEAS - PULM DIAS VEL: 42 CM/SEC
BH CV ECHO MEAS - PULM S/D: 1.32
BH CV ECHO MEAS - PULM SYS VEL: 55.4 CM/SEC
BH CV ECHO MEAS - QP/QS: 0.38
BH CV ECHO MEAS - RAP SYSTOLE: 3 MMHG
BH CV ECHO MEAS - RV MAX PG: 0.79 MMHG
BH CV ECHO MEAS - RV V1 MAX: 44.6 CM/SEC
BH CV ECHO MEAS - RV V1 VTI: 8.9 CM
BH CV ECHO MEAS - RVOT DIAM: 1.99 CM
BH CV ECHO MEAS - RVSP: 34.6 MMHG
BH CV ECHO MEAS - SI(MOD-SP2): 24 ML/M2
BH CV ECHO MEAS - SI(MOD-SP4): 28.5 ML/M2
BH CV ECHO MEAS - SV(LVOT): 73.8 ML
BH CV ECHO MEAS - SV(MOD-SP2): 48 ML
BH CV ECHO MEAS - SV(MOD-SP4): 57 ML
BH CV ECHO MEAS - SV(RVOT): 27.9 ML
BH CV ECHO MEAS - TAPSE (>1.6): 1.8 CM
BH CV ECHO MEAS - TR MAX PG: 31.6 MMHG
BH CV ECHO MEAS - TR MAX VEL: 281 CM/SEC
BH CV ECHO MEASUREMENTS AVERAGE E/E' RATIO: 4.82
BH CV XLRA - RV BASE: 4.2 CM
BH CV XLRA - RV LENGTH: 8.5 CM
BH CV XLRA - RV MID: 2.8 CM
BH CV XLRA - TDI S': 14.7 CM/SEC
BILIRUB SERPL-MCNC: 0.4 MG/DL (ref 0–1.2)
BUN SERPL-MCNC: 29 MG/DL (ref 8–23)
BUN/CREAT SERPL: 27.4 (ref 7–25)
C PNEUM DNA NPH QL NAA+NON-PROBE: NOT DETECTED
CALCIUM SPEC-SCNC: 9.1 MG/DL (ref 8.6–10.5)
CHLORIDE SERPL-SCNC: 103 MMOL/L (ref 98–107)
CO2 SERPL-SCNC: 25.9 MMOL/L (ref 22–29)
CREAT SERPL-MCNC: 1.06 MG/DL (ref 0.76–1.27)
D-LACTATE SERPL-SCNC: 0.9 MMOL/L (ref 0.5–2)
EGFRCR SERPLBLD CKD-EPI 2021: 69.2 ML/MIN/1.73
FLUAV SUBTYP SPEC NAA+PROBE: NOT DETECTED
FLUBV RNA ISLT QL NAA+PROBE: NOT DETECTED
GLOBULIN UR ELPH-MCNC: 2.4 GM/DL
GLUCOSE SERPL-MCNC: 129 MG/DL (ref 65–99)
HADV DNA SPEC NAA+PROBE: NOT DETECTED
HCOV 229E RNA SPEC QL NAA+PROBE: NOT DETECTED
HCOV HKU1 RNA SPEC QL NAA+PROBE: NOT DETECTED
HCOV NL63 RNA SPEC QL NAA+PROBE: NOT DETECTED
HCOV OC43 RNA SPEC QL NAA+PROBE: NOT DETECTED
HMPV RNA NPH QL NAA+NON-PROBE: NOT DETECTED
HPIV1 RNA ISLT QL NAA+PROBE: NOT DETECTED
HPIV2 RNA SPEC QL NAA+PROBE: NOT DETECTED
HPIV3 RNA NPH QL NAA+PROBE: NOT DETECTED
HPIV4 P GENE NPH QL NAA+PROBE: NOT DETECTED
LEFT ATRIUM VOLUME INDEX: 19.4 ML/M2
LEFT ATRIUM VOLUME: 39 ML
M PNEUMO IGG SER IA-ACNC: NOT DETECTED
MAXIMAL PREDICTED HEART RATE: 136 BPM
NT-PROBNP SERPL-MCNC: 450 PG/ML (ref 0–1800)
POTASSIUM SERPL-SCNC: 3.9 MMOL/L (ref 3.5–5.2)
PROCALCITONIN SERPL-MCNC: 0.09 NG/ML (ref 0–0.25)
PROT SERPL-MCNC: 6 G/DL (ref 6–8.5)
RHINOVIRUS RNA SPEC NAA+PROBE: NOT DETECTED
RSV RNA NPH QL NAA+NON-PROBE: NOT DETECTED
SARS-COV-2 RNA NPH QL NAA+NON-PROBE: NOT DETECTED
SINUS: 3.6 CM
SODIUM SERPL-SCNC: 140 MMOL/L (ref 136–145)
STJ: 3.3 CM
STRESS TARGET HR: 116 BPM
TROPONIN T SERPL-MCNC: 0.08 NG/ML (ref 0–0.03)
TROPONIN T SERPL-MCNC: 0.09 NG/ML (ref 0–0.03)
TROPONIN T SERPL-MCNC: 0.16 NG/ML (ref 0–0.03)

## 2022-12-10 PROCEDURE — 87040 BLOOD CULTURE FOR BACTERIA: CPT | Performed by: EMERGENCY MEDICINE

## 2022-12-10 PROCEDURE — 94799 UNLISTED PULMONARY SVC/PX: CPT

## 2022-12-10 PROCEDURE — 84484 ASSAY OF TROPONIN QUANT: CPT | Performed by: HOSPITALIST

## 2022-12-10 PROCEDURE — 0 IOPAMIDOL PER 1 ML: Performed by: HOSPITALIST

## 2022-12-10 PROCEDURE — 93306 TTE W/DOPPLER COMPLETE: CPT | Performed by: INTERNAL MEDICINE

## 2022-12-10 PROCEDURE — 94761 N-INVAS EAR/PLS OXIMETRY MLT: CPT

## 2022-12-10 PROCEDURE — 94760 N-INVAS EAR/PLS OXIMETRY 1: CPT

## 2022-12-10 PROCEDURE — 63710000001 PREDNISONE PER 1 MG: Performed by: INTERNAL MEDICINE

## 2022-12-10 PROCEDURE — 93306 TTE W/DOPPLER COMPLETE: CPT

## 2022-12-10 PROCEDURE — 25010000002 METHYLPREDNISOLONE PER 125 MG: Performed by: EMERGENCY MEDICINE

## 2022-12-10 PROCEDURE — 99222 1ST HOSP IP/OBS MODERATE 55: CPT | Performed by: INTERNAL MEDICINE

## 2022-12-10 PROCEDURE — 84484 ASSAY OF TROPONIN QUANT: CPT | Performed by: NURSE PRACTITIONER

## 2022-12-10 PROCEDURE — 94664 DEMO&/EVAL PT USE INHALER: CPT

## 2022-12-10 PROCEDURE — 25010000002 ENOXAPARIN PER 10 MG: Performed by: HOSPITALIST

## 2022-12-10 PROCEDURE — 71275 CT ANGIOGRAPHY CHEST: CPT

## 2022-12-10 RX ORDER — SODIUM CHLORIDE 0.9 % (FLUSH) 0.9 %
10 SYRINGE (ML) INJECTION EVERY 12 HOURS SCHEDULED
Status: DISCONTINUED | OUTPATIENT
Start: 2022-12-10 | End: 2022-12-12 | Stop reason: HOSPADM

## 2022-12-10 RX ORDER — FUROSEMIDE 40 MG/1
40 TABLET ORAL DAILY
Status: DISCONTINUED | OUTPATIENT
Start: 2022-12-10 | End: 2022-12-12 | Stop reason: HOSPADM

## 2022-12-10 RX ORDER — CALCIUM CARBONATE 200(500)MG
2 TABLET,CHEWABLE ORAL 2 TIMES DAILY PRN
Status: DISCONTINUED | OUTPATIENT
Start: 2022-12-10 | End: 2022-12-12 | Stop reason: HOSPADM

## 2022-12-10 RX ORDER — ROPINIROLE 0.5 MG/1
0.5 TABLET, FILM COATED ORAL NIGHTLY
Status: DISCONTINUED | OUTPATIENT
Start: 2022-12-10 | End: 2022-12-12 | Stop reason: HOSPADM

## 2022-12-10 RX ORDER — POTASSIUM CHLORIDE 750 MG/1
20 TABLET, FILM COATED, EXTENDED RELEASE ORAL DAILY
Status: DISCONTINUED | OUTPATIENT
Start: 2022-12-10 | End: 2022-12-12 | Stop reason: HOSPADM

## 2022-12-10 RX ORDER — IPRATROPIUM BROMIDE AND ALBUTEROL SULFATE 2.5; .5 MG/3ML; MG/3ML
3 SOLUTION RESPIRATORY (INHALATION)
Status: DISCONTINUED | OUTPATIENT
Start: 2022-12-10 | End: 2022-12-10

## 2022-12-10 RX ORDER — BENZONATATE 100 MG/1
200 CAPSULE ORAL 3 TIMES DAILY PRN
Status: DISCONTINUED | OUTPATIENT
Start: 2022-12-10 | End: 2022-12-12 | Stop reason: HOSPADM

## 2022-12-10 RX ORDER — IPRATROPIUM BROMIDE AND ALBUTEROL SULFATE 2.5; .5 MG/3ML; MG/3ML
3 SOLUTION RESPIRATORY (INHALATION)
Status: DISCONTINUED | OUTPATIENT
Start: 2022-12-10 | End: 2022-12-12 | Stop reason: HOSPADM

## 2022-12-10 RX ORDER — PANTOPRAZOLE SODIUM 40 MG/1
40 TABLET, DELAYED RELEASE ORAL DAILY
Status: DISCONTINUED | OUTPATIENT
Start: 2022-12-10 | End: 2022-12-12 | Stop reason: HOSPADM

## 2022-12-10 RX ORDER — METHYLPREDNISOLONE SODIUM SUCCINATE 125 MG/2ML
60 INJECTION, POWDER, LYOPHILIZED, FOR SOLUTION INTRAMUSCULAR; INTRAVENOUS EVERY 12 HOURS
Status: DISCONTINUED | OUTPATIENT
Start: 2022-12-10 | End: 2022-12-10

## 2022-12-10 RX ORDER — IPRATROPIUM BROMIDE AND ALBUTEROL SULFATE 2.5; .5 MG/3ML; MG/3ML
3 SOLUTION RESPIRATORY (INHALATION) EVERY 4 HOURS PRN
Status: DISCONTINUED | OUTPATIENT
Start: 2022-12-10 | End: 2022-12-12 | Stop reason: HOSPADM

## 2022-12-10 RX ORDER — ENOXAPARIN SODIUM 100 MG/ML
40 INJECTION SUBCUTANEOUS EVERY 24 HOURS
Status: DISCONTINUED | OUTPATIENT
Start: 2022-12-10 | End: 2022-12-10

## 2022-12-10 RX ORDER — ONDANSETRON 4 MG/1
4 TABLET, FILM COATED ORAL EVERY 6 HOURS PRN
Status: DISCONTINUED | OUTPATIENT
Start: 2022-12-10 | End: 2022-12-12 | Stop reason: HOSPADM

## 2022-12-10 RX ORDER — ACETAMINOPHEN 650 MG/1
650 SUPPOSITORY RECTAL EVERY 4 HOURS PRN
Status: DISCONTINUED | OUTPATIENT
Start: 2022-12-10 | End: 2022-12-12 | Stop reason: HOSPADM

## 2022-12-10 RX ORDER — ACETAMINOPHEN 160 MG/5ML
650 SOLUTION ORAL EVERY 4 HOURS PRN
Status: DISCONTINUED | OUTPATIENT
Start: 2022-12-10 | End: 2022-12-12 | Stop reason: HOSPADM

## 2022-12-10 RX ORDER — GABAPENTIN 100 MG/1
100 CAPSULE ORAL EVERY 12 HOURS SCHEDULED
Status: DISCONTINUED | OUTPATIENT
Start: 2022-12-10 | End: 2022-12-12 | Stop reason: HOSPADM

## 2022-12-10 RX ORDER — ACETAMINOPHEN 325 MG/1
650 TABLET ORAL EVERY 4 HOURS PRN
Status: DISCONTINUED | OUTPATIENT
Start: 2022-12-10 | End: 2022-12-12 | Stop reason: HOSPADM

## 2022-12-10 RX ORDER — PREDNISONE 20 MG/1
40 TABLET ORAL
Status: DISCONTINUED | OUTPATIENT
Start: 2022-12-10 | End: 2022-12-11

## 2022-12-10 RX ORDER — METHYLPREDNISOLONE SODIUM SUCCINATE 125 MG/2ML
125 INJECTION, POWDER, LYOPHILIZED, FOR SOLUTION INTRAMUSCULAR; INTRAVENOUS ONCE
Status: COMPLETED | OUTPATIENT
Start: 2022-12-10 | End: 2022-12-10

## 2022-12-10 RX ORDER — MULTIPLE VITAMINS W/ MINERALS TAB 9MG-400MCG
1 TAB ORAL DAILY
Status: DISCONTINUED | OUTPATIENT
Start: 2022-12-10 | End: 2022-12-12 | Stop reason: HOSPADM

## 2022-12-10 RX ORDER — SODIUM CHLORIDE 9 MG/ML
40 INJECTION, SOLUTION INTRAVENOUS AS NEEDED
Status: DISCONTINUED | OUTPATIENT
Start: 2022-12-10 | End: 2022-12-12 | Stop reason: HOSPADM

## 2022-12-10 RX ORDER — ONDANSETRON 2 MG/ML
4 INJECTION INTRAMUSCULAR; INTRAVENOUS EVERY 6 HOURS PRN
Status: DISCONTINUED | OUTPATIENT
Start: 2022-12-10 | End: 2022-12-12 | Stop reason: HOSPADM

## 2022-12-10 RX ORDER — BUDESONIDE 0.5 MG/2ML
0.5 INHALANT ORAL
Status: DISCONTINUED | OUTPATIENT
Start: 2022-12-10 | End: 2022-12-12 | Stop reason: HOSPADM

## 2022-12-10 RX ORDER — SODIUM CHLORIDE 0.9 % (FLUSH) 0.9 %
10 SYRINGE (ML) INJECTION AS NEEDED
Status: DISCONTINUED | OUTPATIENT
Start: 2022-12-10 | End: 2022-12-12 | Stop reason: HOSPADM

## 2022-12-10 RX ORDER — NITROGLYCERIN 0.4 MG/1
0.4 TABLET SUBLINGUAL
Status: DISCONTINUED | OUTPATIENT
Start: 2022-12-10 | End: 2022-12-12 | Stop reason: HOSPADM

## 2022-12-10 RX ADMIN — ENOXAPARIN SODIUM 40 MG: 100 INJECTION SUBCUTANEOUS at 10:39

## 2022-12-10 RX ADMIN — IPRATROPIUM BROMIDE AND ALBUTEROL SULFATE 3 ML: .5; 3 SOLUTION RESPIRATORY (INHALATION) at 11:12

## 2022-12-10 RX ADMIN — IPRATROPIUM BROMIDE AND ALBUTEROL SULFATE 3 ML: .5; 3 SOLUTION RESPIRATORY (INHALATION) at 19:28

## 2022-12-10 RX ADMIN — PREDNISONE 40 MG: 20 TABLET ORAL at 09:13

## 2022-12-10 RX ADMIN — APIXABAN 10 MG: 5 TABLET, FILM COATED ORAL at 21:07

## 2022-12-10 RX ADMIN — PANTOPRAZOLE SODIUM 40 MG: 40 TABLET, DELAYED RELEASE ORAL at 09:13

## 2022-12-10 RX ADMIN — Medication 10 ML: at 21:57

## 2022-12-10 RX ADMIN — MULTIPLE VITAMINS W/ MINERALS TAB 1 TABLET: TAB at 10:39

## 2022-12-10 RX ADMIN — ROPINIROLE HYDROCHLORIDE 0.5 MG: 0.5 TABLET, FILM COATED ORAL at 21:57

## 2022-12-10 RX ADMIN — Medication 10 ML: at 02:02

## 2022-12-10 RX ADMIN — MIRABEGRON 25 MG: 25 TABLET, FILM COATED, EXTENDED RELEASE ORAL at 12:15

## 2022-12-10 RX ADMIN — Medication 10 ML: at 09:14

## 2022-12-10 RX ADMIN — IPRATROPIUM BROMIDE AND ALBUTEROL SULFATE 3 ML: .5; 3 SOLUTION RESPIRATORY (INHALATION) at 08:46

## 2022-12-10 RX ADMIN — GABAPENTIN 100 MG: 100 CAPSULE ORAL at 21:07

## 2022-12-10 RX ADMIN — IOPAMIDOL 100 ML: 755 INJECTION, SOLUTION INTRAVENOUS at 18:46

## 2022-12-10 RX ADMIN — POTASSIUM CHLORIDE 20 MEQ: 750 TABLET, EXTENDED RELEASE ORAL at 09:13

## 2022-12-10 RX ADMIN — METHYLPREDNISOLONE SODIUM SUCCINATE 125 MG: 125 INJECTION, POWDER, FOR SOLUTION INTRAMUSCULAR; INTRAVENOUS at 02:01

## 2022-12-10 RX ADMIN — GABAPENTIN 100 MG: 100 CAPSULE ORAL at 09:13

## 2022-12-10 RX ADMIN — FUROSEMIDE 40 MG: 40 TABLET ORAL at 10:39

## 2022-12-10 RX ADMIN — BUDESONIDE 0.5 MG: 0.5 INHALANT ORAL at 11:11

## 2022-12-10 RX ADMIN — BUDESONIDE 0.5 MG: 0.5 INHALANT ORAL at 19:30

## 2022-12-11 PROBLEM — I26.92 ACUTE SADDLE PULMONARY EMBOLISM WITHOUT ACUTE COR PULMONALE (HCC): Status: ACTIVE | Noted: 2022-12-11

## 2022-12-11 LAB
BACTERIA BLD CULT: ABNORMAL
QT INTERVAL: 333 MS

## 2022-12-11 PROCEDURE — 94799 UNLISTED PULMONARY SVC/PX: CPT

## 2022-12-11 PROCEDURE — 99232 SBSQ HOSP IP/OBS MODERATE 35: CPT | Performed by: INTERNAL MEDICINE

## 2022-12-11 PROCEDURE — 94761 N-INVAS EAR/PLS OXIMETRY MLT: CPT

## 2022-12-11 PROCEDURE — 94760 N-INVAS EAR/PLS OXIMETRY 1: CPT

## 2022-12-11 PROCEDURE — 94664 DEMO&/EVAL PT USE INHALER: CPT

## 2022-12-11 PROCEDURE — 63710000001 PREDNISONE PER 1 MG: Performed by: INTERNAL MEDICINE

## 2022-12-11 RX ADMIN — PANTOPRAZOLE SODIUM 40 MG: 40 TABLET, DELAYED RELEASE ORAL at 10:37

## 2022-12-11 RX ADMIN — BUDESONIDE 0.5 MG: 0.5 INHALANT ORAL at 08:45

## 2022-12-11 RX ADMIN — Medication 10 ML: at 21:27

## 2022-12-11 RX ADMIN — PREDNISONE 40 MG: 20 TABLET ORAL at 10:37

## 2022-12-11 RX ADMIN — MULTIPLE VITAMINS W/ MINERALS TAB 1 TABLET: TAB at 10:37

## 2022-12-11 RX ADMIN — IPRATROPIUM BROMIDE AND ALBUTEROL SULFATE 3 ML: .5; 3 SOLUTION RESPIRATORY (INHALATION) at 08:44

## 2022-12-11 RX ADMIN — BUDESONIDE 0.5 MG: 0.5 INHALANT ORAL at 19:06

## 2022-12-11 RX ADMIN — APIXABAN 10 MG: 5 TABLET, FILM COATED ORAL at 21:26

## 2022-12-11 RX ADMIN — MIRABEGRON 25 MG: 25 TABLET, FILM COATED, EXTENDED RELEASE ORAL at 10:37

## 2022-12-11 RX ADMIN — APIXABAN 10 MG: 5 TABLET, FILM COATED ORAL at 10:37

## 2022-12-11 RX ADMIN — IPRATROPIUM BROMIDE AND ALBUTEROL SULFATE 3 ML: .5; 3 SOLUTION RESPIRATORY (INHALATION) at 19:05

## 2022-12-11 RX ADMIN — GABAPENTIN 100 MG: 100 CAPSULE ORAL at 10:37

## 2022-12-11 RX ADMIN — GABAPENTIN 100 MG: 100 CAPSULE ORAL at 21:26

## 2022-12-11 RX ADMIN — POTASSIUM CHLORIDE 20 MEQ: 750 TABLET, EXTENDED RELEASE ORAL at 10:36

## 2022-12-11 RX ADMIN — Medication 10 ML: at 10:37

## 2022-12-11 RX ADMIN — ROPINIROLE HYDROCHLORIDE 0.5 MG: 0.5 TABLET, FILM COATED ORAL at 21:26

## 2022-12-11 RX ADMIN — FUROSEMIDE 40 MG: 40 TABLET ORAL at 10:37

## 2022-12-12 ENCOUNTER — READMISSION MANAGEMENT (OUTPATIENT)
Dept: CALL CENTER | Facility: HOSPITAL | Age: 82
End: 2022-12-12

## 2022-12-12 VITALS
TEMPERATURE: 97.2 F | DIASTOLIC BLOOD PRESSURE: 63 MMHG | WEIGHT: 176 LBS | SYSTOLIC BLOOD PRESSURE: 130 MMHG | OXYGEN SATURATION: 100 % | RESPIRATION RATE: 18 BRPM | HEART RATE: 95 BPM | BODY MASS INDEX: 24.64 KG/M2 | HEIGHT: 71 IN

## 2022-12-12 PROBLEM — R77.8 ELEVATED TROPONIN LEVEL NOT DUE MYOCARDIAL INFARCTION: Status: ACTIVE | Noted: 2022-12-10

## 2022-12-12 PROBLEM — R79.89 ELEVATED TROPONIN LEVEL NOT DUE MYOCARDIAL INFARCTION: Status: ACTIVE | Noted: 2022-12-10

## 2022-12-12 LAB
BACTERIA SPEC AEROBE CULT: ABNORMAL
DEPRECATED RDW RBC AUTO: 41.9 FL (ref 37–54)
ERYTHROCYTE [DISTWIDTH] IN BLOOD BY AUTOMATED COUNT: 12.4 % (ref 12.3–15.4)
GRAM STN SPEC: ABNORMAL
GRAM STN SPEC: ABNORMAL
HCT VFR BLD AUTO: 32.8 % (ref 37.5–51)
HGB BLD-MCNC: 10.8 G/DL (ref 13–17.7)
HOLD SPECIMEN: NORMAL
ISOLATED FROM: ABNORMAL
MCH RBC QN AUTO: 31.3 PG (ref 26.6–33)
MCHC RBC AUTO-ENTMCNC: 32.9 G/DL (ref 31.5–35.7)
MCV RBC AUTO: 95.1 FL (ref 79–97)
PLATELET # BLD AUTO: 319 10*3/MM3 (ref 140–450)
PMV BLD AUTO: 9.4 FL (ref 6–12)
RBC # BLD AUTO: 3.45 10*6/MM3 (ref 4.14–5.8)
WBC NRBC COR # BLD: 13.55 10*3/MM3 (ref 3.4–10.8)

## 2022-12-12 PROCEDURE — 94799 UNLISTED PULMONARY SVC/PX: CPT

## 2022-12-12 PROCEDURE — 97161 PT EVAL LOW COMPLEX 20 MIN: CPT

## 2022-12-12 PROCEDURE — 94618 PULMONARY STRESS TESTING: CPT

## 2022-12-12 PROCEDURE — 85027 COMPLETE CBC AUTOMATED: CPT | Performed by: HOSPITALIST

## 2022-12-12 PROCEDURE — 97116 GAIT TRAINING THERAPY: CPT

## 2022-12-12 RX ORDER — GABAPENTIN 100 MG/1
100 CAPSULE ORAL ONCE
Status: COMPLETED | OUTPATIENT
Start: 2022-12-12 | End: 2022-12-12

## 2022-12-12 RX ADMIN — POTASSIUM CHLORIDE 20 MEQ: 750 TABLET, EXTENDED RELEASE ORAL at 09:44

## 2022-12-12 RX ADMIN — IPRATROPIUM BROMIDE AND ALBUTEROL SULFATE 3 ML: .5; 3 SOLUTION RESPIRATORY (INHALATION) at 10:09

## 2022-12-12 RX ADMIN — MIRABEGRON 25 MG: 25 TABLET, FILM COATED, EXTENDED RELEASE ORAL at 09:45

## 2022-12-12 RX ADMIN — GABAPENTIN 100 MG: 100 CAPSULE ORAL at 10:56

## 2022-12-12 RX ADMIN — BUDESONIDE 0.5 MG: 0.5 INHALANT ORAL at 10:10

## 2022-12-12 RX ADMIN — APIXABAN 10 MG: 5 TABLET, FILM COATED ORAL at 09:44

## 2022-12-12 RX ADMIN — GABAPENTIN 100 MG: 100 CAPSULE ORAL at 09:44

## 2022-12-12 RX ADMIN — MULTIPLE VITAMINS W/ MINERALS TAB 1 TABLET: TAB at 09:44

## 2022-12-12 RX ADMIN — FUROSEMIDE 40 MG: 40 TABLET ORAL at 09:44

## 2022-12-12 RX ADMIN — Medication 10 ML: at 09:44

## 2022-12-12 RX ADMIN — PANTOPRAZOLE SODIUM 40 MG: 40 TABLET, DELAYED RELEASE ORAL at 09:44

## 2022-12-12 NOTE — OUTREACH NOTE
Prep Survey    Flowsheet Row Responses   Nondenominational facility patient discharged from? [facility]   Is LACE score < 7 ? No   Emergency Room discharge w/ pulse ox? No   Eligibility [facility]   Date of Admission 12/09/22   Date of Discharge 12/12/22   Discharge Disposition Home-Health Care Svc   Discharge diagnosis pulmonary embolism    Does the patient have one of the following disease processes/diagnoses(primary or secondary)? Other   Does the patient have Home health ordered? Yes   What is the Home health agency?  [facility]   Is there a DME ordered? No   Prep survey completed? Yes          HAYDEE DODSON - Registered Nurse

## 2022-12-13 ENCOUNTER — TRANSITIONAL CARE MANAGEMENT TELEPHONE ENCOUNTER (OUTPATIENT)
Dept: CALL CENTER | Facility: HOSPITAL | Age: 82
End: 2022-12-13

## 2022-12-13 ENCOUNTER — PATIENT OUTREACH (OUTPATIENT)
Dept: CASE MANAGEMENT | Facility: OTHER | Age: 82
End: 2022-12-13

## 2022-12-13 LAB
BACTERIA SPEC AEROBE CULT: ABNORMAL
GRAM STN SPEC: ABNORMAL
ISOLATED FROM: ABNORMAL

## 2022-12-13 NOTE — OUTREACH NOTE
AMBULATORY CASE MANAGEMENT NOTE    Patient Outreach        Name and Relationship of Patient/Support Person: Ricardo Leonard - Emergency Contact    Outgoing call to the patient.  No answer.  Outgoing call to son Stef and son Frederick with VM.  Outgoing call to son Ricardo.  Introduced self and explained role.  He states that he was aware his father has been discharged from the hospital but has not talked with him.  He did provide contact phone number to [facility] [phone].  RN-ACM spoke with an employee and he states that he could go knock on his door and ask Tony to call RN-VARSHAM.  Did notify son that his father spoke with the call center earlier today and is was not clear is he had taken his medication, Eliquis.  He was complaining of pain and stated he could not think clearly.  His son, Ricardo states that he was probably asleep and that he often does not answer his phone.  He now plans to drive from his home in [place] to check on him.  Awaiting a call from the patient and will follow up today.     JUAN JOSE PHAM  Ambulatory Case Management    12/13/2022, 15:14 EST     16:16 EST  Incoming VM from Ricardo and patient.  He states he is doing OK now.  Outgoing call to Ricardo and he states he has left now and patient's phone is not working.  Incoming call from his brother Frederick.  He states he is there now and his brother Ed is on the way there.  They will help him find his Eliquis as he has not been able to find his medication.  They will notify RN-MAURI if there is an issue with his medication.  They plan to assist him with his needs tonight.  Outreach has been scheduled for 12/14.

## 2022-12-13 NOTE — OUTREACH NOTE
"Call Center TCM Note    Flowsheet Row Responses   [facility] patient discharged from? [facility]   Does the patient have one of the following disease processes/diagnoses(primary or secondary)? Other   TCM attempt successful? Yes   Call start time 1259   Call end time 1303   Discharge diagnosis pulmonary embolism    Meds reviewed with patient/caregiver? Yes   Does the patient have all medications ordered at discharge? Yes   Is the patient taking all medications as directed (includes completed medication regime)? N/A  [Unsure if pt took his eliquis this morning. He does have it. ]   Comments Hospital d/c f/u appt is on 12/14/22 at 1:45 PM   Does the patient have an appointment with their PCP within 7 days of discharge? Yes   What is the Home health agency?  [facility]   Has home health visited the patient within 72 hours of discharge? No   Psychosocial issues? No   Psychosocial comments Pt states he cannot think straight d/t being in pain. He received new script for eliquis, but he doen't know where it is. He says his sons are not going to be able to help but he believes he has someone else to help him.   Notified Case Management Psychosocial (Non Urgent)   Did the patient receive a copy of their discharge instructions? Yes   Nursing interventions Reviewed instructions with patient   What is the patient's perception of their health status since discharge? Same  [Pt states, \"I'm doing terrible. I'm in pain and I cannot think straight.\"]   Is the patient/caregiver able to teach back signs and symptoms related to disease process for when to call PCP? Yes   Is the patient/caregiver able to teach back signs and symptoms related to disease process for when to call 911? Yes   Is the patient/caregiver able to teach back the hierarchy of who to call/visit for symptoms/problems? PCP, Specialist, Home health nurse, Urgent Care, ED, 911 Yes   If the patient is a current smoker, are they able to teach back resources for cessation? " Not a smoker   TCM call completed? Yes   Wrap up additional comments Pt states he cannot think straight d/t being in pain. He received new script for eliquis, but he doen't know where it is. He says his sons are not going to be able to help but he believes he has someone else to help him.   Call end time 1303   Would this patient benefit from a Referral to Amb Social Work? Yes   Reason for Social Work Referral Other Social Barriers to Care, Caregiving/Support   Is the patient interested in additional calls from an ambulatory ?  NOTE:  applies to high risk patients requiring additional follow-up. Zahra Hernandez RN    12/13/2022, 13:19 EST

## 2022-12-14 ENCOUNTER — PATIENT OUTREACH (OUTPATIENT)
Dept: CASE MANAGEMENT | Facility: OTHER | Age: 82
End: 2022-12-14

## 2022-12-14 DIAGNOSIS — M25.551 BILATERAL HIP PAIN: ICD-10-CM

## 2022-12-14 DIAGNOSIS — M25.552 BILATERAL HIP PAIN: ICD-10-CM

## 2022-12-14 RX ORDER — GABAPENTIN 100 MG/1
100 CAPSULE ORAL EVERY 12 HOURS SCHEDULED
Qty: 20 CAPSULE | Refills: 0 | Status: SHIPPED | OUTPATIENT
Start: 2022-12-14 | End: 2022-12-29

## 2022-12-14 NOTE — TELEPHONE ENCOUNTER
Caller: Horacio Tony OSCAR    Relationship: Self    Best call back number: [phone]   Requested Prescriptions:   Requested Prescriptions     Pending Prescriptions Disp Refills   • gabapentin (NEURONTIN) 100 MG capsule 20 capsule 0     Sig: Take 1 capsule by mouth Every 12 (Twelve) Hours for 10 days.   • apixaban (ELIQUIS) 5 MG tablet tablet 60 tablet 1     Sig: Take 1 tablet by mouth Every 12 (Twelve) Hours. Indications: DVT/PE (active thrombosis)        Pharmacy where request should be sent: [address] - [phone]  - [phone]     Additional details provided by patient:     Does the patient have less than a 3 day supply:  [x] Yes  [] No    Would you like a call back once the refill request has been completed: [x] Yes [] No    If the office needs to give you a call back, can they leave a voicemail: [x] Yes [] No    Cherelle Guevara Rep   12/14/22 13:37 EST

## 2022-12-14 NOTE — OUTREACH NOTE
AMBULATORY CASE MANAGEMENT NOTE    Name and Relationship of Patient/Support Person: VICKY CASEY - Emergency Contact    Adult Patient Profile  Questions/Answers    Flowsheet Row Most Recent Value   Barriers to Taking Medication as Prescribed forget to take it  [son has fixed his medication in a pill sorter and set up reminders for him to take his medications]        SDOH updated and reviewed with the patient during this program:  Food Insecurity: Food Insecurity Present   • Worried About Running Out of Food in the Last Year: Never true   • Ran Out of Food in the Last Year: Sometimes true      Transportation Needs: Unmet Transportation Needs   • Lack of Transportation (Medical): No   • Lack of Transportation (Non-Medical): Yes     Patient Outreach    Spoke with son, Vicky Casey.  He states that they found his medications last night and he got a pill sorter set up for the patient.  He has set up reminders to take his medications.  This will be need follow up.  Discussed [employer] application and services and [employer] and Ed would like to apply.  He plans to do this independently online but would welcome a  evaluation and assist.  He agrees with engagement in HRCM for his father and verbalized understanding they may opt out at anytime and there is no charge.  Follow up has been scheduled for one week as Ed prefers.    JUAN JOSE PHAM  Ambulatory Case Management    12/14/2022, 13:56 EST

## 2022-12-15 ENCOUNTER — PATIENT OUTREACH (OUTPATIENT)
Dept: CASE MANAGEMENT | Facility: OTHER | Age: 82
End: 2022-12-15

## 2022-12-15 ENCOUNTER — TELEPHONE (OUTPATIENT)
Dept: FAMILY MEDICINE CLINIC | Facility: CLINIC | Age: 82
End: 2022-12-15

## 2022-12-15 NOTE — OUTREACH NOTE
Social Work Assessment  Questions/Answers    Flowsheet Row Most Recent Value   Referral Source nursing   Reason for Consult community resources, transportation, housing concerns/homeless   Preferred Language English   Advance Care Planning Reviewed present on chart   People in Home alone   Current Living Arrangements apartment   Primary Care Provided by self   Provides Primary Care For no one, unable/limited ability to care for self   Employment Status retired   Usual Activity Tolerance fair   Current Activity Tolerance fair   Medications assistive person   Meal Preparation assistive person   Housekeeping completely dependent   Laundry completely dependent   Shopping completely dependent   Transportation Concerns other (see comments)  [patient's son concerned about patient driving]   Transportation Anticipated agency        SDOH updated and reviewed with the patient during this program:  Financial Resource Strain: Low Risk    • Difficulty of Paying Living Expenses: Not very hard      Food Insecurity: Food Insecurity Present   • Worried About Running Out of Food in the Last Year: Never true   • Ran Out of Food in the Last Year: Sometimes true      Transportation Needs: Unmet Transportation Needs   • Lack of Transportation (Medical): No   • Lack of Transportation (Non-Medical): Yes      Housing Stability: Low Risk    • Unable to Pay for Housing in the Last Year: No   • Number of Places Lived in the Last Year: 1   • Unstable Housing in the Last Year: No      Continuing Care   [facility]    [address] , [facility] [address]    Phone: [phone]    Resource for: Transportation Needs   [facility]    [address], [place] [id number]    Phone: [phone]     Patient Outreach    MSW outreach to patient's son Stef to assist with community resources available to assist his father. Patient's son states that patient currently  lives alone in a senior living apartment at this time. Patient's son believes an assisted living or similar would be appropriate, but unsure if patient would be able to afford the cost. MSW referred patient's son to contact [facility] to have a discussion about alternate housing options with patient and son. Patient's son discussed the need for prepared meals, but states that the cost of meal delivery service is expensive. Patient's son prefers to  prepared meals from grocery for his father to eat. MSW and patient's son also discussed transportation options if patient no longer able to drive. MSW discussed contacting [facility] to discuss [employer] for transportation to his father's medical appointments. No additional needs at this time. Patient's son Edward to call back to MSW as needed with additional questions.     COURTNEY MANJARREZ -   Ambulatory Case Management    12/15/2022, 14:17 EST

## 2022-12-15 NOTE — TELEPHONE ENCOUNTER
Caller: GEREMIAS    Relationship: Home Health    Best call back number: [phone]    Requested Prescriptions: NAPROXEN 500 MG       Pharmacy where request should be sent:    [facility] - [address] - [phone]  - [phone] FX  [phone]  Additional details provided by patient: GEREMIAS STATES THE PHARMACY NEEDS THE PRESCRIPTION FOR THE ABOVE MEDICATION.  SHE STATES THEY NEED THE REQUEST BY 11:00 AM.    Does the patient have less than a 3 day supply:  [x] Yes  [] No    Would you like a call back once the refill request has been completed: [x] Yes [] No    If the office needs to give you a call back, can they leave a voicemail: [x] Yes [] No    Kiley Billingsley, [employer] Rep   12/15/22 10:47 EST     PLEASE ADVISE.          
Patient is taking apixaban cannot use Naprosyn.  Can only use Tylenol  
Rudy holt  
<-- Click to add NO pertinent Past Medical History

## 2022-12-19 ENCOUNTER — TELEPHONE (OUTPATIENT)
Dept: GASTROENTEROLOGY | Facility: CLINIC | Age: 82
End: 2022-12-19

## 2022-12-19 RX ORDER — PANTOPRAZOLE SODIUM 40 MG/1
40 TABLET, DELAYED RELEASE ORAL DAILY
Qty: 30 TABLET | Refills: 0 | Status: SHIPPED | OUTPATIENT
Start: 2022-12-19

## 2022-12-19 NOTE — TELEPHONE ENCOUNTER
Caller: Tony Leonard OSCAR    Relationship: Self    Best call back number: [phone]    Requested Prescriptions:   Requested Prescriptions     Pending Prescriptions Disp Refills   • pantoprazole (PROTONIX) 40 MG EC tablet 90 tablet 3     Sig: Take 1 tablet by mouth Daily.        Pharmacy where request should be sent: [facility] [phone]     Additional details provided by patient: NEW PHARMACY PATIENT COMPLETELY OUT OF MEDS    Does the patient have less than a 3 day supply:  [x] Yes  [] No    Would you like a call back once the refill request has been completed: [] Yes [] No    If the office needs to give you a call back, can they leave a voicemail: [x] Yes [] No    Cherelle Baker Rep   12/19/22 15:47 EST

## 2022-12-21 ENCOUNTER — PATIENT OUTREACH (OUTPATIENT)
Dept: CASE MANAGEMENT | Facility: OTHER | Age: 82
End: 2022-12-21

## 2022-12-21 NOTE — OUTREACH NOTE
AMBULATORY CASE MANAGEMENT NOTE    Name and Relationship of Patient/Support Person: VICKY CASEY - Emergency Contact    Adult Patient Profile  Questions/Answers    Flowsheet Row Most Recent Value   Barriers to Taking Medication as Prescribed none  [son states he is taking his medication]        Patient Outreach    Outgoing call to Vicky lind.  He states he was provided with information for resources from [employer] [facility].  He has not started the application for [employer] at this time.  He states that he thinks his father is taking his medication.  He saw him this weekend and took him to shop for food.  He prefers an outreach in 3-4 weeks and this has been scheduled.     JUAN JOSE PHAM  Ambulatory Case Management    12/21/2022, 17:16 EST

## 2022-12-27 ENCOUNTER — TELEPHONE (OUTPATIENT)
Dept: ORTHOPEDIC SURGERY | Facility: CLINIC | Age: 82
End: 2022-12-27

## 2022-12-27 NOTE — TELEPHONE ENCOUNTER
PATIENT RESCHEDULED FOR 1/16/2023 AT 2:30PM     NUMBER CALLED:[phone]     12/27/2022 16:13 KMK

## 2022-12-29 ENCOUNTER — APPOINTMENT (OUTPATIENT)
Dept: GENERAL RADIOLOGY | Facility: HOSPITAL | Age: 82
DRG: 193 | End: 2022-12-29
Payer: MEDICARE

## 2022-12-29 ENCOUNTER — HOSPITAL ENCOUNTER (INPATIENT)
Facility: HOSPITAL | Age: 82
LOS: 7 days | Discharge: HOME HEALTH | DRG: 193 | End: 2023-01-06
Attending: EMERGENCY MEDICINE | Admitting: STUDENT IN AN ORGANIZED HEALTH CARE EDUCATION/TRAINING PROGRAM
Payer: MEDICARE

## 2022-12-29 DIAGNOSIS — J44.1 COPD EXACERBATION: ICD-10-CM

## 2022-12-29 DIAGNOSIS — I26.99 OTHER ACUTE PULMONARY EMBOLISM WITHOUT ACUTE COR PULMONALE: ICD-10-CM

## 2022-12-29 DIAGNOSIS — J18.9 PNEUMONIA OF RIGHT UPPER LOBE DUE TO INFECTIOUS ORGANISM: Primary | ICD-10-CM

## 2022-12-29 DIAGNOSIS — I50.9 ACUTE ON CHRONIC CONGESTIVE HEART FAILURE, UNSPECIFIED HEART FAILURE TYPE: ICD-10-CM

## 2022-12-29 LAB
ALBUMIN SERPL-MCNC: 4.1 G/DL (ref 3.5–5.2)
ALBUMIN/GLOB SERPL: 1.5 G/DL
ALP SERPL-CCNC: 86 U/L (ref 39–117)
ALT SERPL W P-5'-P-CCNC: 19 U/L (ref 1–41)
ANION GAP SERPL CALCULATED.3IONS-SCNC: 8.9 MMOL/L (ref 5–15)
AST SERPL-CCNC: 27 U/L (ref 1–40)
BASOPHILS # BLD AUTO: 0.08 10*3/MM3 (ref 0–0.2)
BASOPHILS NFR BLD AUTO: 0.9 % (ref 0–1.5)
BILIRUB SERPL-MCNC: 0.9 MG/DL (ref 0–1.2)
BUN SERPL-MCNC: 11 MG/DL (ref 8–23)
BUN/CREAT SERPL: 11.5 (ref 7–25)
CALCIUM SPEC-SCNC: 9.4 MG/DL (ref 8.6–10.5)
CHLORIDE SERPL-SCNC: 102 MMOL/L (ref 98–107)
CO2 SERPL-SCNC: 25.1 MMOL/L (ref 22–29)
CREAT SERPL-MCNC: 0.96 MG/DL (ref 0.76–1.27)
D-LACTATE SERPL-SCNC: 1.4 MMOL/L (ref 0.5–2)
DEPRECATED RDW RBC AUTO: 45.9 FL (ref 37–54)
EGFRCR SERPLBLD CKD-EPI 2021: 77.9 ML/MIN/1.73
EOSINOPHIL # BLD AUTO: 1.15 10*3/MM3 (ref 0–0.4)
EOSINOPHIL NFR BLD AUTO: 13.3 % (ref 0.3–6.2)
ERYTHROCYTE [DISTWIDTH] IN BLOOD BY AUTOMATED COUNT: 13.1 % (ref 12.3–15.4)
GLOBULIN UR ELPH-MCNC: 2.7 GM/DL
GLUCOSE SERPL-MCNC: 85 MG/DL (ref 65–99)
HCT VFR BLD AUTO: 38.6 % (ref 37.5–51)
HGB BLD-MCNC: 13.2 G/DL (ref 13–17.7)
HOLD SPECIMEN: NORMAL
HOLD SPECIMEN: NORMAL
IMM GRANULOCYTES # BLD AUTO: 0.01 10*3/MM3 (ref 0–0.05)
IMM GRANULOCYTES NFR BLD AUTO: 0.1 % (ref 0–0.5)
LYMPHOCYTES # BLD AUTO: 1.4 10*3/MM3 (ref 0.7–3.1)
LYMPHOCYTES NFR BLD AUTO: 16.2 % (ref 19.6–45.3)
MCH RBC QN AUTO: 32.7 PG (ref 26.6–33)
MCHC RBC AUTO-ENTMCNC: 34.2 G/DL (ref 31.5–35.7)
MCV RBC AUTO: 95.5 FL (ref 79–97)
MONOCYTES # BLD AUTO: 0.97 10*3/MM3 (ref 0.1–0.9)
MONOCYTES NFR BLD AUTO: 11.2 % (ref 5–12)
NEUTROPHILS NFR BLD AUTO: 5.02 10*3/MM3 (ref 1.7–7)
NEUTROPHILS NFR BLD AUTO: 58.3 % (ref 42.7–76)
NRBC BLD AUTO-RTO: 0 /100 WBC (ref 0–0.2)
NT-PROBNP SERPL-MCNC: 557 PG/ML (ref 0–1800)
PLATELET # BLD AUTO: 233 10*3/MM3 (ref 140–450)
PMV BLD AUTO: 9.1 FL (ref 6–12)
POTASSIUM SERPL-SCNC: 4.2 MMOL/L (ref 3.5–5.2)
PROCALCITONIN SERPL-MCNC: 0.12 NG/ML (ref 0–0.25)
PROT SERPL-MCNC: 6.8 G/DL (ref 6–8.5)
RBC # BLD AUTO: 4.04 10*6/MM3 (ref 4.14–5.8)
SODIUM SERPL-SCNC: 136 MMOL/L (ref 136–145)
TROPONIN T SERPL-MCNC: 0.01 NG/ML (ref 0–0.03)
WBC NRBC COR # BLD: 8.63 10*3/MM3 (ref 3.4–10.8)
WHOLE BLOOD HOLD COAG: NORMAL
WHOLE BLOOD HOLD SPECIMEN: NORMAL

## 2022-12-29 PROCEDURE — 83605 ASSAY OF LACTIC ACID: CPT | Performed by: EMERGENCY MEDICINE

## 2022-12-29 PROCEDURE — 99285 EMERGENCY DEPT VISIT HI MDM: CPT

## 2022-12-29 PROCEDURE — 93005 ELECTROCARDIOGRAM TRACING: CPT | Performed by: EMERGENCY MEDICINE

## 2022-12-29 PROCEDURE — 25010000002 FUROSEMIDE PER 20 MG: Performed by: EMERGENCY MEDICINE

## 2022-12-29 PROCEDURE — G0378 HOSPITAL OBSERVATION PER HR: HCPCS

## 2022-12-29 PROCEDURE — 84145 PROCALCITONIN (PCT): CPT | Performed by: EMERGENCY MEDICINE

## 2022-12-29 PROCEDURE — 94799 UNLISTED PULMONARY SVC/PX: CPT

## 2022-12-29 PROCEDURE — 93010 ELECTROCARDIOGRAM REPORT: CPT | Performed by: INTERNAL MEDICINE

## 2022-12-29 PROCEDURE — 36415 COLL VENOUS BLD VENIPUNCTURE: CPT

## 2022-12-29 PROCEDURE — 84484 ASSAY OF TROPONIN QUANT: CPT | Performed by: EMERGENCY MEDICINE

## 2022-12-29 PROCEDURE — 83880 ASSAY OF NATRIURETIC PEPTIDE: CPT | Performed by: EMERGENCY MEDICINE

## 2022-12-29 PROCEDURE — 71045 X-RAY EXAM CHEST 1 VIEW: CPT

## 2022-12-29 PROCEDURE — 94640 AIRWAY INHALATION TREATMENT: CPT

## 2022-12-29 PROCEDURE — 94760 N-INVAS EAR/PLS OXIMETRY 1: CPT

## 2022-12-29 PROCEDURE — 85025 COMPLETE CBC W/AUTO DIFF WBC: CPT | Performed by: EMERGENCY MEDICINE

## 2022-12-29 PROCEDURE — 25010000002 FUROSEMIDE PER 20 MG: Performed by: INTERNAL MEDICINE

## 2022-12-29 PROCEDURE — 87040 BLOOD CULTURE FOR BACTERIA: CPT | Performed by: EMERGENCY MEDICINE

## 2022-12-29 PROCEDURE — 80053 COMPREHEN METABOLIC PANEL: CPT | Performed by: EMERGENCY MEDICINE

## 2022-12-29 PROCEDURE — 94761 N-INVAS EAR/PLS OXIMETRY MLT: CPT

## 2022-12-29 RX ORDER — POTASSIUM CHLORIDE 750 MG/1
20 TABLET, FILM COATED, EXTENDED RELEASE ORAL DAILY
Status: DISCONTINUED | OUTPATIENT
Start: 2022-12-29 | End: 2023-01-06 | Stop reason: HOSPADM

## 2022-12-29 RX ORDER — PANTOPRAZOLE SODIUM 40 MG/1
40 TABLET, DELAYED RELEASE ORAL DAILY
Status: DISCONTINUED | OUTPATIENT
Start: 2022-12-29 | End: 2023-01-06 | Stop reason: HOSPADM

## 2022-12-29 RX ORDER — ONDANSETRON 2 MG/ML
4 INJECTION INTRAMUSCULAR; INTRAVENOUS EVERY 6 HOURS PRN
Status: DISCONTINUED | OUTPATIENT
Start: 2022-12-29 | End: 2023-01-06 | Stop reason: HOSPADM

## 2022-12-29 RX ORDER — METHYLPHENIDATE HYDROCHLORIDE 10 MG/1
10 TABLET ORAL DAILY
Status: DISCONTINUED | OUTPATIENT
Start: 2022-12-29 | End: 2023-01-06 | Stop reason: HOSPADM

## 2022-12-29 RX ORDER — SODIUM CHLORIDE 9 MG/ML
40 INJECTION, SOLUTION INTRAVENOUS AS NEEDED
Status: DISCONTINUED | OUTPATIENT
Start: 2022-12-29 | End: 2023-01-06 | Stop reason: HOSPADM

## 2022-12-29 RX ORDER — FUROSEMIDE 10 MG/ML
80 INJECTION INTRAMUSCULAR; INTRAVENOUS ONCE
Status: COMPLETED | OUTPATIENT
Start: 2022-12-29 | End: 2022-12-29

## 2022-12-29 RX ORDER — FUROSEMIDE 10 MG/ML
80 INJECTION INTRAMUSCULAR; INTRAVENOUS
Status: DISCONTINUED | OUTPATIENT
Start: 2022-12-29 | End: 2023-01-05

## 2022-12-29 RX ORDER — CALCIUM CARBONATE 200(500)MG
1 TABLET,CHEWABLE ORAL AS NEEDED
Status: DISCONTINUED | OUTPATIENT
Start: 2022-12-29 | End: 2023-01-06 | Stop reason: HOSPADM

## 2022-12-29 RX ORDER — GABAPENTIN 300 MG/1
300 CAPSULE ORAL 3 TIMES DAILY
Status: DISCONTINUED | OUTPATIENT
Start: 2022-12-29 | End: 2023-01-06 | Stop reason: HOSPADM

## 2022-12-29 RX ORDER — ACETAMINOPHEN 650 MG/1
650 SUPPOSITORY RECTAL EVERY 4 HOURS PRN
Status: DISCONTINUED | OUTPATIENT
Start: 2022-12-29 | End: 2023-01-06 | Stop reason: HOSPADM

## 2022-12-29 RX ORDER — ACETAMINOPHEN 500 MG
500 TABLET ORAL EVERY 6 HOURS PRN
Status: DISCONTINUED | OUTPATIENT
Start: 2022-12-29 | End: 2023-01-06 | Stop reason: HOSPADM

## 2022-12-29 RX ORDER — ALBUTEROL SULFATE 2.5 MG/3ML
2.5 SOLUTION RESPIRATORY (INHALATION)
Status: DISCONTINUED | OUTPATIENT
Start: 2022-12-29 | End: 2023-01-06 | Stop reason: HOSPADM

## 2022-12-29 RX ORDER — ACETAMINOPHEN 325 MG/1
650 TABLET ORAL EVERY 4 HOURS PRN
Status: DISCONTINUED | OUTPATIENT
Start: 2022-12-29 | End: 2023-01-06 | Stop reason: HOSPADM

## 2022-12-29 RX ORDER — GABAPENTIN 300 MG/1
300 CAPSULE ORAL 3 TIMES DAILY
COMMUNITY
End: 2023-02-17 | Stop reason: SDUPTHER

## 2022-12-29 RX ORDER — ROPINIROLE 0.5 MG/1
0.5 TABLET, FILM COATED ORAL NIGHTLY
Status: DISCONTINUED | OUTPATIENT
Start: 2022-12-29 | End: 2023-01-06 | Stop reason: HOSPADM

## 2022-12-29 RX ORDER — SODIUM CHLORIDE FOR INHALATION 7 %
1 VIAL, NEBULIZER (ML) INHALATION 2 TIMES DAILY
COMMUNITY

## 2022-12-29 RX ORDER — NITROGLYCERIN 0.4 MG/1
0.4 TABLET SUBLINGUAL
Status: DISCONTINUED | OUTPATIENT
Start: 2022-12-29 | End: 2023-01-06 | Stop reason: HOSPADM

## 2022-12-29 RX ORDER — SODIUM CHLORIDE 0.9 % (FLUSH) 0.9 %
10 SYRINGE (ML) INJECTION AS NEEDED
Status: DISCONTINUED | OUTPATIENT
Start: 2022-12-29 | End: 2023-01-06 | Stop reason: HOSPADM

## 2022-12-29 RX ORDER — ACETAMINOPHEN 160 MG/5ML
650 SOLUTION ORAL EVERY 4 HOURS PRN
Status: DISCONTINUED | OUTPATIENT
Start: 2022-12-29 | End: 2023-01-06 | Stop reason: HOSPADM

## 2022-12-29 RX ORDER — SODIUM CHLORIDE 0.9 % (FLUSH) 0.9 %
10 SYRINGE (ML) INJECTION EVERY 12 HOURS SCHEDULED
Status: DISCONTINUED | OUTPATIENT
Start: 2022-12-29 | End: 2023-01-06 | Stop reason: HOSPADM

## 2022-12-29 RX ORDER — METHYLPHENIDATE HYDROCHLORIDE 10 MG/1
10 TABLET ORAL DAILY
COMMUNITY
End: 2023-02-17 | Stop reason: SDUPTHER

## 2022-12-29 RX ADMIN — GABAPENTIN 300 MG: 300 CAPSULE ORAL at 18:00

## 2022-12-29 RX ADMIN — ROPINIROLE HYDROCHLORIDE 0.5 MG: 0.5 TABLET, FILM COATED ORAL at 20:02

## 2022-12-29 RX ADMIN — FUROSEMIDE 80 MG: 10 INJECTION, SOLUTION INTRAMUSCULAR; INTRAVENOUS at 17:59

## 2022-12-29 RX ADMIN — ALBUTEROL SULFATE 2.5 MG: 2.5 SOLUTION RESPIRATORY (INHALATION) at 19:14

## 2022-12-29 RX ADMIN — Medication 10 ML: at 20:04

## 2022-12-29 RX ADMIN — FUROSEMIDE 80 MG: 10 INJECTION, SOLUTION INTRAMUSCULAR; INTRAVENOUS at 14:16

## 2022-12-29 RX ADMIN — ACETAMINOPHEN 650 MG: 325 TABLET, FILM COATED ORAL at 20:04

## 2022-12-29 RX ADMIN — POTASSIUM CHLORIDE 20 MEQ: 750 TABLET, EXTENDED RELEASE ORAL at 18:00

## 2022-12-29 RX ADMIN — APIXABAN 5 MG: 5 TABLET, FILM COATED ORAL at 19:39

## 2022-12-29 RX ADMIN — PANTOPRAZOLE SODIUM 40 MG: 40 TABLET, DELAYED RELEASE ORAL at 18:00

## 2022-12-29 RX ADMIN — ALBUTEROL SULFATE 2.5 MG: 2.5 SOLUTION RESPIRATORY (INHALATION) at 23:21

## 2022-12-29 RX ADMIN — METHYLPHENIDATE HYDROCHLORIDE 10 MG: 10 TABLET ORAL at 18:00

## 2022-12-29 NOTE — ED PROVIDER NOTES
EMERGENCY DEPARTMENT ENCOUNTER    Room Number:  S611/1  Date seen:  12/29/2022  PCP: Erich Aviles MD  Historian: Patient      HPI:  Chief Complaint: Short of breath  A complete HPI/ROS/PMH/PSH/SH/FH are unobtainable due to: Nothing  Context: Tony Leonard is a 82 y.o. male who presents to the ED c/o shortness of breath onset this morning upon waking up.  He was reportedly hypoxic prior to arrival EMS and required a nonrebreather mask to get his oxygen level.  On arrival his oxygen saturation is 100% with nonrebreather mask in place.  Patient was recently discharged in the hospital after being admitted for COPD exacerbation and being found to have a saddle pulmonary embolus.  He reports compliance with his Eliquis but he did not yet take it this morning.  He reports a chronic cough, not worse today.  He does ambulate with a Rollator.  He denies dyspnea on exertion.  Typically he only wears oxygen at night.  He does have some increased leg swelling recently.  He denies fever or chills.  He denies chest pain.            PAST MEDICAL HISTORY  Active Ambulatory Problems     Diagnosis Date Noted   • Thrush, oral 03/11/2016   • ADD (attention deficit disorder) 03/11/2016   • Hemorrhoids 03/11/2016   • Bronchiectasis with acute lower respiratory infection (HCC) 03/11/2016   • Diverticul disease small and large intestine, no perforati or abscess 03/11/2016   • Benign non-nodular prostatic hyperplasia without lower urinary tract symptoms 03/11/2016   • Gastroesophageal reflux disease 03/11/2016   • Malaise and fatigue 03/11/2016   • Environmental allergies 04/25/2016   • Hemoptysis 04/27/2016   • Dyslipidemia 05/11/2016   • Primary osteoarthritis involving multiple joints 05/11/2016   • Pneumonia of right lower lobe due to infectious organism 10/03/2016   • Abnormal EKG 10/04/2016   • COPD (chronic obstructive pulmonary disease) ([facility]) 10/04/2016   • Mild malnutrition ([facility]) 03/28/2017   • Acute on chronic respiratory  failure with hypoxia ([facility]) 04/27/2017   • Fracture, intertrochanteric, right femur, closed, initial encounter ([facility]) 01/16/2018   • Chronic diastolic CHF (congestive heart failure) ([facility]) 01/16/2018   • Hematoma of frontal scalp 01/16/2018   • Postoperative anemia due to acute blood loss 01/19/2018   • Urinary retention 01/25/2018   • Hemorrhagic disorder due to circulating anticoagulants ([facility]) 01/29/2018   • History of fracture of right hip 02/22/2018   • Closed fracture of right hip with routine healing 02/28/2018   • Chronic low back pain with sciatica 06/21/2018   • Change in bowel function 04/18/2019   • Rectal bleeding 04/18/2019   • Prostate cancer ([facility]) 04/22/2019   • On home oxygen therapy 09/24/2019   • Acute viral bronchitis 12/29/2019   • Chronic diastolic (congestive) heart failure ([facility]) 10/14/2020   • Peripheral neuropathy 07/01/2021   • Plantar fasciitis 09/08/2021   • COPD exacerbation ([facility]) 05/07/2022   • Bilateral lower extremity edema 05/07/2022   • Microscopic hematuria 05/08/2022   • Acute midline low back pain with right-sided sciatica 08/01/2022   • Spinal stenosis, lumbar region with neurogenic claudication 08/02/2022   • Compression deformity of vertebra 08/02/2022   • Rectal bleed 09/23/2022   • Esophagitis 09/24/2022   • Angiectasia 09/27/2022   • Hiatal hernia 09/27/2022   • Overweight (BMI 25.0-29.9) 11/14/2022   • Leukocytosis 11/15/2022   • Bilateral hip pain 11/21/2022   • Elevated troponin level not due myocardial infarction 12/10/2022   • Nocturnal hypoxia 12/10/2022   • Acute saddle pulmonary embolism without acute cor pulmonale ([facility]) - 12/11/2022 12/11/2022     Resolved Ambulatory Problems     Diagnosis Date Noted   • Pneumonia of both lower lobes due to infectious organism 03/11/2016   • Pneumonia of right upper lobe due to infectious organism 04/22/2016   • COPD exacerbation ([facility]) 04/25/2016   • GERD (gastroesophageal reflux disease) 04/25/2016   • Chronic respiratory failure with  hypoxia ([facility]) 10/04/2016   • Bacterial lobar pneumonia 12/27/2016   • Pneumonia of left lower lobe due to infectious organism 03/26/2017   • Bronchitis 06/01/2017   • COPD exacerbation ([facility]) 06/17/2017   • Leukocytosis 01/16/2018   • Right upper lobe pneumonia 01/20/2018   • Mucus plugging of bronchi 01/16/2018   • COPD exacerbation ([facility]) 04/16/2018   • Degenerative joint disease (DJD) of hip 09/23/2019   • Bronchiectasis with (acute) exacerbation ([facility]) 12/28/2019   • Primary hypertension 05/06/2022   • Septic shock ([facility]) 11/26/2022     Past Medical History:   Diagnosis Date   • ADHD (attention deficit hyperactivity disorder)    • Bronchiectasis ([facility])    • CHF (congestive heart failure) ([facility])    • Colon polyp    • Diverticulosis    • Hard of hearing    • Pneumonia          PAST SURGICAL HISTORY  Past Surgical History:   Procedure Laterality Date   • BRONCHOSCOPY N/A 4/30/2016    Procedure: BRONCHOSCOPY with BAL of right lower lobe and left  lower lobe.  ;  Surgeon: Nando Diaz MD;  Location: [facility] ENDOSCOPY;  Service:    • BRONCHOSCOPY N/A 4/28/2017    Procedure: BRONCHOSCOPY;  Surgeon: Katharina Salter MD;  Location: [facility] ENDOSCOPY;  Service:    • BRONCHOSCOPY Bilateral 6/29/2017    Procedure: BRONCHOSCOPY WITH WASHINGS;  Surgeon: Katharina Salter MD;  Location: [facility] ENDOSCOPY;  Service:    • BRONCHOSCOPY N/A 1/22/2018    Procedure: BRONCHOSCOPY AT BEDSIDE with BAL;  Surgeon: Katharina Salter MD;  Location: [facility] ENDOSCOPY;  Service:    • BRONCHOSCOPY N/A 1/30/2018    Procedure: BRONCHOSCOPY with BAL and washing;  Surgeon: Shreyas Wild MD;  Location: [facility] ENDOSCOPY;  Service:    • BRONCHOSCOPY Bilateral 4/24/2018    Procedure: BRONCHOSCOPY WITH WASHING;  Surgeon: Katharina Salter MD;  Location: [facility] ENDOSCOPY;  Service: Pulmonary   • BRONCHOSCOPY N/A 12/28/2019    Procedure: BRONCHOSCOPY with bilateral washing;  Surgeon: Katharina Salter MD;  Location: [facility] ENDOSCOPY;  Service: Pulmonary   • COLONOSCOPY   05/17/2013    eh, ih, tort, sig tics   • COLONOSCOPY N/A 5/10/2019    Non-thrombosed external hemorrhoids found on perianal exam, Diverticulosis, Tortuous colon, One 5 mm polyp in the mid ascending colon, IH. Path: Tubular adenoma.    • COLONOSCOPY N/A 9/24/2022    Procedure: COLONOSCOPY to cecum and TI with argon plasma coagulation.;  Surgeon: Bruce Black MD;  Location: [facility] ENDOSCOPY;  Service: Gastroenterology;  Laterality: N/A;  pre- GI bleeding  post- radiation proctitis, diverticulosis   • ENDOSCOPY  03/19/2015    z line irreg, hh   • ENDOSCOPY N/A 9/24/2022    Procedure: ESOPHAGOGASTRODUODENOSCOPY;  Surgeon: Bruce Black MD;  Location: [facility] ENDOSCOPY;  Service: Gastroenterology;  Laterality: N/A;  pre- GI bleeding  post- esophagitis, hiatal hernia   • HIP OPEN REDUCTION Right 1/17/2018    Procedure: HIP OPEN REDUCTION INTERNAL FIXATION WITH DYNAMIC HIP SCREW;  Surgeon: Les Black MD;  Location: [facility] OR;  Service:    • SPINE SURGERY     • TONSILLECTOMY     • TOTAL HIP ARTHROPLASTY REVISION Right 9/23/2019    Procedure: HIP  REVISION RIGHT;  Surgeon: Isauro Warner MD;  Location: [facility] OR;  Service: Orthopedics         FAMILY HISTORY  Family History   Problem Relation Age of Onset   • Thyroid disease Mother    • No Known Problems Father    • Malig Hyperthermia Neg Hx          SOCIAL HISTORY  Social History     Socioeconomic History   • Marital status: Single   Tobacco Use   • Smoking status: Never   • Smokeless tobacco: Never   Vaping Use   • Vaping Use: Never used   Substance and Sexual Activity   • Alcohol use: No     Comment: red wine occassional   • Drug use: No   • Sexual activity: Defer         ALLERGIES  Daliresp [roflumilast], Latex, and Sulfa antibiotics        REVIEW OF SYSTEMS  Review of Systems   Review of all 14 systems is negative other than stated in the HPI above.      PHYSICAL EXAM  ED Triage Vitals   Temp Heart Rate Resp BP SpO2   12/29/22 1114 12/29/22 1116  12/29/22 1116 12/29/22 1116 12/29/22 1116   98.2 °F (36.8 °C) 103 22 140/80 100 %      Temp src Heart Rate Source Patient Position BP Location FiO2 (%)   12/29/22 1114 -- -- -- --   Oral           Physical Exam      GENERAL: Awake and alert, appears to be in mild respiratory distress   HENT: nares patent  EYES: no scleral icterus, EOMI  CV: regular rhythm, normal rate, no murmur, 2+ pitting edema bilateral lower extremities  RESPIRATORY: normal effort, mild rhonchi present particular left upper lung fields, no wheezing.  Patient currently on high flow nasal cannula at 5 L with O2 saturations 99%.  ABDOMEN: soft, nondistended, nontender throughout  MUSCULOSKELETAL: no deformity  NEURO: alert, moves all extremities, follows commands, cranial nerves II through XII grossly intact, speech fluent and clear  PSYCH:  calm, cooperative  SKIN: warm, dry    Vital signs and nursing notes reviewed.          LAB RESULTS  Recent Results (from the past 24 hour(s))   ECG 12 Lead Dyspnea    Collection Time: 12/29/22 11:35 AM   Result Value Ref Range    QT Interval 357 ms   Comprehensive Metabolic Panel    Collection Time: 12/29/22 12:04 PM    Specimen: Blood   Result Value Ref Range    Glucose 85 65 - 99 mg/dL    BUN 11 8 - 23 mg/dL    Creatinine 0.96 0.76 - 1.27 mg/dL    Sodium 136 136 - 145 mmol/L    Potassium 4.2 3.5 - 5.2 mmol/L    Chloride 102 98 - 107 mmol/L    CO2 25.1 22.0 - 29.0 mmol/L    Calcium 9.4 8.6 - 10.5 mg/dL    Total Protein 6.8 6.0 - 8.5 g/dL    Albumin 4.1 3.5 - 5.2 g/dL    ALT (SGPT) 19 1 - 41 U/L    AST (SGOT) 27 1 - 40 U/L    Alkaline Phosphatase 86 39 - 117 U/L    Total Bilirubin 0.9 0.0 - 1.2 mg/dL    Globulin 2.7 gm/dL    A/G Ratio 1.5 g/dL    BUN/Creatinine Ratio 11.5 7.0 - 25.0    Anion Gap 8.9 5.0 - 15.0 mmol/L    eGFR 77.9 >60.0 mL/min/1.73   Lactic Acid, Plasma    Collection Time: 12/29/22 12:04 PM    Specimen: Blood   Result Value Ref Range    Lactate 1.4 0.5 - 2.0 mmol/L   CBC Auto Differential     Collection Time: 12/29/22 12:04 PM    Specimen: Blood   Result Value Ref Range    WBC 8.63 3.40 - 10.80 10*3/mm3    RBC 4.04 (L) 4.14 - 5.80 10*6/mm3    Hemoglobin 13.2 13.0 - 17.7 g/dL    Hematocrit 38.6 37.5 - 51.0 %    MCV 95.5 79.0 - 97.0 fL    MCH 32.7 26.6 - 33.0 pg    MCHC 34.2 31.5 - 35.7 g/dL    RDW 13.1 12.3 - 15.4 %    RDW-SD 45.9 37.0 - 54.0 fl    MPV 9.1 6.0 - 12.0 fL    Platelets 233 140 - 450 10*3/mm3    Neutrophil % 58.3 42.7 - 76.0 %    Lymphocyte % 16.2 (L) 19.6 - 45.3 %    Monocyte % 11.2 5.0 - 12.0 %    Eosinophil % 13.3 (H) 0.3 - 6.2 %    Basophil % 0.9 0.0 - 1.5 %    Immature Grans % 0.1 0.0 - 0.5 %    Neutrophils, Absolute 5.02 1.70 - 7.00 10*3/mm3    Lymphocytes, Absolute 1.40 0.70 - 3.10 10*3/mm3    Monocytes, Absolute 0.97 (H) 0.10 - 0.90 10*3/mm3    Eosinophils, Absolute 1.15 (H) 0.00 - 0.40 10*3/mm3    Basophils, Absolute 0.08 0.00 - 0.20 10*3/mm3    Immature Grans, Absolute 0.01 0.00 - 0.05 10*3/mm3    nRBC 0.0 0.0 - 0.2 /100 WBC   Green Top (Gel)    Collection Time: 12/29/22 12:04 PM   Result Value Ref Range    Extra Tube Hold for add-ons.    Lavender Top    Collection Time: 12/29/22 12:04 PM   Result Value Ref Range    Extra Tube hold for add-on    Gold Top - SST    Collection Time: 12/29/22 12:04 PM   Result Value Ref Range    Extra Tube Hold for add-ons.    Light Blue Top    Collection Time: 12/29/22 12:04 PM   Result Value Ref Range    Extra Tube Hold for add-ons.    BNP    Collection Time: 12/29/22 12:04 PM    Specimen: Blood   Result Value Ref Range    proBNP 557.0 0.0 - 1,800.0 pg/mL   Troponin    Collection Time: 12/29/22 12:04 PM    Specimen: Blood   Result Value Ref Range    Troponin T 0.012 0.000 - 0.030 ng/mL   Procalcitonin    Collection Time: 12/29/22 12:04 PM    Specimen: Blood   Result Value Ref Range    Procalcitonin 0.12 0.00 - 0.25 ng/mL       Ordered the above labs and reviewed the results.        RADIOLOGY  XR Chest 1 View    Result Date: 12/29/2022  XR CHEST 1 VW-   HISTORY: Male who is 82 years-old,  short of breath  TECHNIQUE: Frontal view of the chest  COMPARISON: 12 /9/2022  FINDINGS: Heart, mediastinum and pulmonary vasculature are unremarkable. Hazy opacity at the right apex may represent infiltrate, follow-up recommended. No pleural effusion, or pneumothorax. No acute osseous process.      Hazy opacity at the right apex, follow-up recommended.  This report was finalized on 12/29/2022 12:34 PM by Dr. Tj Evans M.D.        Ordered the above noted radiological studies. Reviewed by me in PACS.            PROCEDURES  Procedures              MEDICATIONS GIVEN IN ER  Medications   sodium chloride 0.9 % flush 10 mL (has no administration in time range)   apixaban (ELIQUIS) tablet 5 mg (has no administration in time range)   furosemide (LASIX) injection 80 mg (has no administration in time range)   rOPINIRole (REQUIP) tablet 0.5 mg (has no administration in time range)   potassium chloride (K-DUR,KLOR-CON) ER tablet 20 mEq (has no administration in time range)   pantoprazole (PROTONIX) EC tablet 40 mg (has no administration in time range)   methylphenidate (RITALIN) tablet 10 mg (has no administration in time range)   gabapentin (NEURONTIN) capsule 300 mg (has no administration in time range)   calcium carbonate (TUMS) chewable tablet 500 mg (200 mg elemental) (has no administration in time range)   albuterol (PROVENTIL) nebulizer solution 0.083% 2.5 mg/3mL ( Nebulization Canceled Entry 12/29/22 1627)   acetaminophen (TYLENOL) tablet 500 mg (has no administration in time range)   sodium chloride 0.9 % flush 10 mL (has no administration in time range)   sodium chloride 0.9 % flush 10 mL (has no administration in time range)   sodium chloride 0.9 % infusion 40 mL (has no administration in time range)   nitroglycerin (NITROSTAT) SL tablet 0.4 mg (has no administration in time range)   ondansetron (ZOFRAN) injection 4 mg (has no administration in time range)   acetaminophen  (TYLENOL) tablet 650 mg (has no administration in time range)     Or   acetaminophen (TYLENOL) 160 MG/5ML solution 650 mg (has no administration in time range)     Or   acetaminophen (TYLENOL) suppository 650 mg (has no administration in time range)   furosemide (LASIX) injection 80 mg (80 mg Intravenous Given 12/29/22 1416)                   MEDICAL DECISION MAKING, PROGRESS, and CONSULTS    All labs have been independently reviewed by me.  All radiology studies have been reviewed by me and I have also reviewed the radiology report.   EKG's independently viewed and interpreted by me.  Discussion below represents my analysis of pertinent findings related to patient's condition, differential diagnosis, treatment plan and final disposition.      Additional sources:      - External (non-ED) record review: See below        Orders placed during this visit:  Orders Placed This Encounter   Procedures   • Blood Culture - Blood,   • Blood Culture - Blood,   • XR Chest 1 View   • Comprehensive Metabolic Panel   • Lactic Acid, Plasma   • [place] Draw   • CBC Auto Differential   • BNP   • Troponin   • Procalcitonin   • Potassium   • Magnesium   • Troponin   • Blood Gas, Arterial -   • Comprehensive Metabolic Panel   • CBC Auto Differential   • Diet: Cardiac Diets; Healthy Heart (2-3 Na+); Texture: Regular Texture (IDDSI 7); Fluid Consistency: Thin (IDDSI 0)   • Undress & Gown   • Continuous Pulse Oximetry   • Vital Signs   • Vital Signs   • Intake & Output   • Weigh Patient   • Oral Care   • Telemetry - Maintain IV Access   • Continuous Cardiac Monitoring   • Telemetry - Pulse Oximetry   • May Be Off Telemetry for Tests   • Notify Provider if ACLS Protocol Activated   • Code Status and Medical Interventions:   • LHA (on-call MD unless specified) Details   • Oxygen Therapy- Nasal Cannula; Titrate for SPO2: 90% - 95%   • Oxygen Therapy- Nasal Cannula; Titrate for SPO2: 90% - 95%   • ECG 12 Lead Dyspnea   • ECG 12 Lead Other;  Acute Chest Pain or Dysrhythmia   • Insert Peripheral IV   • Insert Peripheral IV   • Initiate Observation Status   • CBC & Differential   • Green Top (Gel)   • Lavender Top   • Gold Top - SST   • Light Blue Top               Differential diagnosis:    My differential diagnosis for dyspnea includes but is not limited to:  Asthma, COPD, pneumonia, pulmonary embolus, acute respiratory distress syndrome, pneumothorax, pleural effusion, pulmonary fibrosis, congestive heart failure, myocardial infarction, DKA, uremia, acidosis, sepsis, anemia, drug related, hyperventilation, CNS disease        Independent interpretation of labs, radiology studies, and discussions with consultants:  ED Course as of 12/29/22 1700   Thu Dec 29, 2022   1123 Medical record review: I reviewed discharge summary from 12/12/2022.  Patient was admitted for shortness of breath.  He was ultimately found to have a saddle pulmonary embolus and was discharged on Eliquis. [JR]   1300 Chest x-ray independently reviewed in PACS.  There is some opacity present the right upper lung fields.  This could represent infiltrate or possibly even a pulmonary infarct given his known pulmonary embolus. [JR]   1402 Patient does have bilateral lower extremity edema on examination.  I have ordered Lasix for diuresis.  I will add a procalcitonin to further differentiate the right upper lobe opacity as it may be reflective of edema as well.  He is afebrile with no leukocytosis therefore have not certain that this is pneumonia.  Antibiotics will be held at this time. [JR]   1420 Discussed with Dr. Frye [facility], who agrees to admit. [JR]      ED Course User Index  [JR] Andrzej Miramontes MD             I wore an N95 mask, face shield, and gloves during this patient encounter.  Patient also wearing a surgical mask.  Hand hygeine performed before and after seeing the patient.    DIAGNOSIS  Final diagnoses:   Pneumonia of right upper lobe due to infectious organism   Other  acute pulmonary embolism without acute cor pulmonale (HCC)   Acute on chronic congestive heart failure, unspecified heart failure type (HCC)         DISPOSITION  ADMIT            Latest Documented Vital Signs:  As of 17:00 EST  BP- 142/82 HR- 111 Temp- 98.2 °F (36.8 °C) (Oral) O2 sat- 95%              --    Please note that portions of this were completed with a voice recognition program.       Note Disclaimer: At [facility], we believe that sharing information builds trust and better relationships. You are receiving this note because you are receiving care at [facility] or recently visited. It is possible you will see health information before a provider has talked with you about it. This kind of information can be easy to misunderstand. To help you fully understand what it means for your health, we urge you to discuss this note with your provider.           Andrzej Miramontes MD  12/29/22 [id number]

## 2022-12-29 NOTE — PROGRESS NOTES
Clinical Pharmacy Services: Medication History    Tony Leonard is a 82 y.o. male presenting to [facility] for   Chief Complaint   Patient presents with   • Shortness of Breath       He  has a past medical history of ADHD (attention deficit hyperactivity disorder), Bronchiectasis ([facility]), Colon polyp, COPD (chronic obstructive pulmonary disease) ([facility]), Diverticulosis, Hard of hearing, On home oxygen therapy, Pneumonia, Prostate cancer ([facility]) (04/22/2019), and Spinal stenosis, lumbar region with neurogenic claudication (08/02/2022).    Allergies as of 12/29/2022 - Reviewed 12/29/2022   Allergen Reaction Noted   • Daliresp [roflumilast] Anaphylaxis 12/14/2018   • Latex Rash 03/10/2016   • Sulfa antibiotics Rash 03/10/2016       Medication information was obtained from: Patient  Pharmacy and Phone Number:     Prior to Admission Medications     Prescriptions Last Dose Informant Patient Reported? Taking?    acetaminophen (TYLENOL) 500 MG tablet 12/28/2022 Self Yes Yes    Take 500 mg by mouth Every 6 (Six) Hours As Needed for Mild Pain.    albuterol (PROVENTIL) (2.5 MG/3ML) 0.083% nebulizer solution 12/28/2022 Self No Yes    Take 2.5 mg by nebulization Every 6 (Six) Hours As Needed for Wheezing.    apixaban (ELIQUIS) 5 MG tablet tablet 12/28/2022 Self No Yes    Take 1 tablet by mouth Every 12 (Twelve) Hours. Indications: DVT/PE (active thrombosis)    budesonide (PULMICORT) 180 MCG/ACT inhaler 12/28/2022 Self No Yes    Inhale 2 puffs 2 (Two) Times a Day.    calcium carbonate (TUMS) 500 MG chewable tablet 12/28/2022 Self Yes Yes    Chew 1 tablet As Needed for Indigestion or Heartburn.    gabapentin (NEURONTIN) 300 MG capsule 12/28/2022 Self Yes Yes    Take 300 mg by mouth 3 (Three) Times a Day.    guaifenesin (ROBITUSSIN) 100 MG/5ML liquid  Self No Yes    Take 10 mL by mouth Every 4 (Four) Hours As Needed for Cough.    methylphenidate (RITALIN) 10 MG tablet 12/28/2022 Self Yes Yes    Take 10 mg by mouth  Daily.    Multiple Vitamins-Minerals (MULTIVITAMIN ADULT PO) 12/28/2022 Self Yes Yes    Take 1 tablet by mouth Daily.    pantoprazole (PROTONIX) 40 MG EC tablet 12/28/2022 Self No Yes    Take 1 tablet by mouth Daily.    potassium chloride (K-DUR,KLOR-CON) 20 MEQ CR tablet 12/28/2022 Self Yes Yes    Take 1 tablet by mouth Daily.    rOPINIRole (REQUIP) 0.5 MG tablet 12/28/2022 Self No Yes    Take 1 tablet by mouth Every Night. Take 1 hour before bedtime.    Patient taking differently:  Take 0.5 mg by mouth Every Night.    sodium chloride 7 % nebulizer solution nebulizer solution 12/28/2022 Self Yes Yes    Inhale 1 vial 2 (Two) Times a Day.    terbinafine (lamiSIL) 250 MG tablet 12/28/2022 Self Yes Yes    Take 250 mg by mouth Daily.            Medication notes:     This medication list is complete to the best of my knowledge as of 12/29/2022    Please call if questions.    Ida Panda  Medication History Technician   [phone]    12/29/2022 12:36 EST

## 2022-12-29 NOTE — H&P
Internal medicine history and physical  INTERNAL MEDICINE   [facility]       Patient Identification:  Name: Tony Leonard  Age: 82 y.o.  Sex: male  :  1940  MRN: [id number]                   Primary Care Physician: Erich Aviles MD                               Date of admission:2022    Chief Complaint: Worsening hypoxia and increasing shortness of breath with oxygen saturation in the 80s on 4 L resulting in home health calling EMS.    History of Present Illness:   Patient is a 82-year-old male who has complicated past medical history including history of severe spinal stenosis with neurogenic claudication and back pain who has home physical therapy, and work with him.  He also has underlying history of ADHD, bronchiectasis, COPD with chronic hypoxia and oxygen supplementation.  Patient gets 4 L of nasal cannula continue supplementation.  It appears that patient has been hospitalized 3 times since late November till today for various issues ranging from sepsis with septic shock and respiratory failure during 4 days of hospitalization from 2022 through 2022.  Patient was discharged on oral cefdinir for possible COPD exacerbation/pneumonia.  Patient was then subsequently hospitalized again on 2022 because of saddle pulmonary embolus hypoxia and elevated troponin.  Patient was evaluated by cardiology service and conservative management with anticoagulation therapy and subsequent switch to oral anticoagulation therapy was recommended.  Patient was placed on oral Eliquis and was discharged on 2022.  According to the patient he was doing well and compliant with his Eliquis and participating in physical therapy and went to bed feeling fine.  He woke up this morning felt out of sorts and weak and noticed that he is not breathing well and went home health care provider came for appointment noticed that patient was obviously short of breath really got worse when he  was assisted to the bathroom.  EMS was called and patient was brought to the emergency room because on 4 L nasal cannula he was noted to have oxygen saturation dropping to 80%.  Work-up in the emergency room did reveal mild leukocytosis mild renal insufficiency with BUN of 29 creatinine of 1.06 with troponin of 0.012 which is much improved compared to previous hospitalization.  Chest x-ray performed earlier shows hazy opacity in the right apex for which follow-up was recommended.  Work-up in the emergency room revealed normal BNP troponin of 0.012 and Pro-Edgar of 0.12.  Patient is being admitted for possible COPD exacerbation with hypoxia with plans to closely monitor for any evolving pneumonia which is considered to be not an active issue at this time since at present his Pro-Edgar is within normal limits and patient has normal white blood cell count.    Past Medical History:  Past Medical History:   Diagnosis Date   • ADHD (attention deficit hyperactivity disorder)    • Bronchiectasis (HCC)    • Colon polyp    • COPD (chronic obstructive pulmonary disease) (HCC)    • Diverticulosis    • Hard of hearing    • On home oxygen therapy     2 LITERS   • Pneumonia    • Prostate cancer (HCC) 04/22/2019   • Spinal stenosis, lumbar region with neurogenic claudication 08/02/2022     Past Surgical History:  Past Surgical History:   Procedure Laterality Date   • BRONCHOSCOPY N/A 4/30/2016    Procedure: BRONCHOSCOPY with BAL of right lower lobe and left  lower lobe.  ;  Surgeon: Nando Diaz MD;  Location: [facility] ENDOSCOPY;  Service:    • BRONCHOSCOPY N/A 4/28/2017    Procedure: BRONCHOSCOPY;  Surgeon: Katharina Salter MD;  Location: [facility] ENDOSCOPY;  Service:    • BRONCHOSCOPY Bilateral 6/29/2017    Procedure: BRONCHOSCOPY WITH WASHINGS;  Surgeon: Katharina Salter MD;  Location: [facility] ENDOSCOPY;  Service:    • BRONCHOSCOPY N/A 1/22/2018    Procedure: BRONCHOSCOPY AT BEDSIDE with BAL;  Surgeon: Katharina Salter MD;  Location:   [facility] ENDOSCOPY;  Service:    • BRONCHOSCOPY N/A 1/30/2018    Procedure: BRONCHOSCOPY with BAL and washing;  Surgeon: Shreyas Wild MD;  Location: [facility] ENDOSCOPY;  Service:    • BRONCHOSCOPY Bilateral 4/24/2018    Procedure: BRONCHOSCOPY WITH WASHING;  Surgeon: Katharina Salter MD;  Location: [facility] ENDOSCOPY;  Service: Pulmonary   • BRONCHOSCOPY N/A 12/28/2019    Procedure: BRONCHOSCOPY with bilateral washing;  Surgeon: Katharina Salter MD;  Location: [facility] ENDOSCOPY;  Service: Pulmonary   • COLONOSCOPY  05/17/2013    eh, ih, tort, sig tics   • COLONOSCOPY N/A 5/10/2019    Non-thrombosed external hemorrhoids found on perianal exam, Diverticulosis, Tortuous colon, One 5 mm polyp in the mid ascending colon, IH. Path: Tubular adenoma.    • COLONOSCOPY N/A 9/24/2022    Procedure: COLONOSCOPY to cecum and TI with argon plasma coagulation.;  Surgeon: Bruce Black MD;  Location: [facility] ENDOSCOPY;  Service: Gastroenterology;  Laterality: N/A;  pre- GI bleeding  post- radiation proctitis, diverticulosis   • ENDOSCOPY  03/19/2015    z line irreg, hh   • ENDOSCOPY N/A 9/24/2022    Procedure: ESOPHAGOGASTRODUODENOSCOPY;  Surgeon: Bruce Black MD;  Location: [facility] ENDOSCOPY;  Service: Gastroenterology;  Laterality: N/A;  pre- GI bleeding  post- esophagitis, hiatal hernia   • HIP OPEN REDUCTION Right 1/17/2018    Procedure: HIP OPEN REDUCTION INTERNAL FIXATION WITH DYNAMIC HIP SCREW;  Surgeon: Les Black MD;  Location: [facility] MAIN OR;  Service:    • SPINE SURGERY     • TONSILLECTOMY     • TOTAL HIP ARTHROPLASTY REVISION Right 9/23/2019    Procedure: HIP  REVISION RIGHT;  Surgeon: Isauro Warner MD;  Location: [facility] MAIN OR;  Service: Orthopedics      Home Meds:  Medications Prior to Admission   Medication Sig Dispense Refill Last Dose   • acetaminophen (TYLENOL) 500 MG tablet Take 500 mg by mouth Every 6 (Six) Hours As Needed for Mild Pain.   12/28/2022   • albuterol (PROVENTIL) (2.5 MG/3ML) 0.083% nebulizer  solution Take 2.5 mg by nebulization Every 6 (Six) Hours As Needed for Wheezing. 360 mL 3 12/28/2022   • apixaban (ELIQUIS) 5 MG tablet tablet Take 1 tablet by mouth Every 12 (Twelve) Hours. Indications: DVT/PE (active thrombosis) 60 tablet 1 12/28/2022   • budesonide (PULMICORT) 180 MCG/ACT inhaler Inhale 2 puffs 2 (Two) Times a Day. 1 each 3 12/28/2022   • calcium carbonate (TUMS) 500 MG chewable tablet Chew 1 tablet As Needed for Indigestion or Heartburn.   12/28/2022   • gabapentin (NEURONTIN) 300 MG capsule Take 300 mg by mouth 3 (Three) Times a Day.   12/28/2022   • guaifenesin (ROBITUSSIN) 100 MG/5ML liquid Take 10 mL by mouth Every 4 (Four) Hours As Needed for Cough. 118 mL 1    • methylphenidate (RITALIN) 10 MG tablet Take 10 mg by mouth Daily.   12/28/2022   • Multiple Vitamins-Minerals (MULTIVITAMIN ADULT PO) Take 1 tablet by mouth Daily.   12/28/2022   • pantoprazole (PROTONIX) 40 MG EC tablet Take 1 tablet by mouth Daily. 30 tablet 0 12/28/2022   • potassium chloride (K-DUR,KLOR-CON) 20 MEQ CR tablet Take 1 tablet by mouth Daily.   12/28/2022   • rOPINIRole (REQUIP) 0.5 MG tablet Take 1 tablet by mouth Every Night. Take 1 hour before bedtime. (Patient taking differently: Take 0.5 mg by mouth Every Night.) 90 tablet 1 12/28/2022   • sodium chloride 7 % nebulizer solution nebulizer solution Inhale 1 vial 2 (Two) Times a Day.   12/28/2022   • terbinafine (lamiSIL) 250 MG tablet Take 250 mg by mouth Daily.   12/28/2022     Current Meds:     Current Facility-Administered Medications:   •  apixaban (ELIQUIS) tablet 5 mg, 5 mg, Oral, Once, Andrzej Miramontes MD  •  sodium chloride 0.9 % flush 10 mL, 10 mL, Intravenous, PRN, Andrzej Miramontes MD  Allergies:  Allergies   Allergen Reactions   • Daliresp [Roflumilast] Anaphylaxis   • Latex Rash   • Sulfa Antibiotics Rash     Social History:   Social History     Tobacco Use   • Smoking status: Never   • Smokeless tobacco: Never   Substance Use Topics   •  "Alcohol use: No     Comment: red wine occassional      Family History:  Family History   Problem Relation Age of Onset   • Thyroid disease Mother    • No Known Problems Father    • Malig Hyperthermia Neg Hx           Review of Systems  See history of present illness and past medical history.   As described in history presenting illness.  Patient himself is majorly concerned about pain and discomfort in his back and hips.      Vitals:   /80 (BP Location: Right arm, Patient Position: Lying)   Pulse 90   Temp 98.2 °F (36.8 °C) (Oral)   Resp 16   Ht 180.3 cm (71\")   Wt 79.8 kg (176 lb)   SpO2 93%   BMI 24.55 kg/m²   I/O: No intake or output data in the 24 hours ending 12/29/22 1622  Exam:  Patient is examined using the personal protective equipment as per guidelines from infection control for this particular patient as enacted.  Hand washing was performed before and after patient interaction.  General Appearance:   Chronically ill cooperative male who does not appear to be in any acute distress   Head:    Normocephalic, without obvious abnormality, atraumatic   Eyes:    PERRL, conjunctiva/corneas clear, EOM's intact, both eyes   Ears:    Normal external ear canals, both ears   Nose:   Nares normal, septum midline, mucosa normal, no drainage    or sinus tenderness   Throat:   Lips, tongue, gums normal; oral mucosa pink and dry   Neck:   Supple, symmetrical, trachea midline, no adenopathy;     thyroid:  no enlargement/tenderness/nodules; no carotid    bruit or JVD   Back:     Symmetric, no curvature, ROM normal, no CVA tenderness   Lungs:    Bilateral scattered occasional rhonchi   Chest Wall:    No tenderness or deformity    Heart:    Regular rate and rhythm, S1 and S2 normal, no murmur, rub   or gallop   Abdomen:    Soft nontender   Extremities:  Chronic pigmentary changes involving both lower extremities.   Pulses:   Pulses palpable in all extremities; symmetric all extremities   Skin:  Chronic changes " involving upper lower extremity with ecchymosis.   Neurologic:  Hard of hearing and grossly nonfocal       Data Review:      I reviewed the patient's new clinical results.  Results from last 7 days   Lab Units 12/29/22  1204   WBC 10*3/mm3 8.63   HEMOGLOBIN g/dL 13.2   PLATELETS 10*3/mm3 233     Results from last 7 days   Lab Units 12/29/22  1204   SODIUM mmol/L 136   POTASSIUM mmol/L 4.2   CHLORIDE mmol/L 102   CO2 mmol/L 25.1   BUN mg/dL 11   CREATININE mg/dL 0.96   CALCIUM mg/dL 9.4   GLUCOSE mg/dL 85     CT Angiogram Chest    Result Date: 12/10/2022   1. Saddle pulmonary embolus extending into the right and left pulmonary arteries, without evidence of right heart strain, with an RV to LV ratio of 1.5. No definite pulmonary infarction is seen.  FINDINGS were called to the patient's nurse at 7:16 PM. She will contact his physician.  Radiation dose reduction techniques were utilized, including automated exposure control and exposure modulation based on body size.  This report was finalized on 12/10/2022 7:19 PM by Dr. Danielle Arango M.D.      XR Chest 1 View    Result Date: 12/29/2022  Hazy opacity at the right apex, follow-up recommended.  This report was finalized on 12/29/2022 12:34 PM by Dr. Tj Evans M.D.      XR Chest 1 View    Result Date: 12/10/2022  No acute findings.  This report was finalized on 12/10/2022 12:28 AM by Dr. Danielle Arango M.D.      Microbiology Results (last 10 days)     ** No results found for the last 240 hours. **            Assessment:  Active Hospital Problems    Diagnosis  POA   • **Acute on chronic respiratory failure with hypoxia (HCC) [J96.21]  Yes   • Pneumonia of right upper lobe due to infectious organism [J18.9]  Yes   • Acute saddle pulmonary embolism without acute cor pulmonale (HCC) - 12/11/2022 [I26.92]  Yes   • Elevated troponin level not due myocardial infarction [R77.8]  Yes   • Spinal stenosis, lumbar region with neurogenic claudication [M48.062]  Yes    • COPD exacerbation (HCC) [J44.1]  Yes   • Chronic diastolic (congestive) heart failure (HCC) [I50.32]  Yes   • On home oxygen therapy [Z99.81]  Not Applicable     2 LITERS     • Prostate cancer (HCC) [C61]  Yes   • COPD (chronic obstructive pulmonary disease) (HCC) [J44.9]  Yes   • ADD (attention deficit disorder) [F98.8]  Yes       See medical decision making/care plan: See admitting orders  Sudden shortness of breath earlier today with hypoxia-underlying etiology could be:  1-aspiration pneumonia involving the right upper lung versus  2-COPD exacerbation  3-possible exacerbation of diastolic congestive heart failure  4-history of saddle embolus with possible recurrent pulmonary embolism while on Eliquis-  Plan is to continue  with nebulizer treatment, IV diuretics( lasix), oxygen supplementation and empiric antibiotic therapy while following up on the blood culture results though his procalcitonin is within normal limits.  Consider de-escalation of antibiotics and discontinuation if he continues to get better without any progressive leukocytosis.  Continue Eliquis and consider possibility of Eliquis failure in the situation.  May require alternate anticoagulation therapy.  Chronic back pain-continue his pain regimen and monitor his mental status and watch for hypercapnic respiratory failure.  Danni Frye MD   12/29/2022  16:22 EST    Parts of this note may be an electronic transcription/translation of spoken language to printed text using the Dragon dictation system.

## 2022-12-30 ENCOUNTER — OUTSIDE FACILITY SERVICE (OUTPATIENT)
Dept: FAMILY MEDICINE CLINIC | Facility: CLINIC | Age: 82
End: 2022-12-30
Payer: MEDICARE

## 2022-12-30 LAB
ALBUMIN SERPL-MCNC: 3.4 G/DL (ref 3.5–5.2)
ALBUMIN/GLOB SERPL: 1.4 G/DL
ALP SERPL-CCNC: 71 U/L (ref 39–117)
ALT SERPL W P-5'-P-CCNC: 10 U/L (ref 1–41)
ANION GAP SERPL CALCULATED.3IONS-SCNC: 11.1 MMOL/L (ref 5–15)
AST SERPL-CCNC: 16 U/L (ref 1–40)
BASOPHILS # BLD AUTO: 0.08 10*3/MM3 (ref 0–0.2)
BASOPHILS NFR BLD AUTO: 0.9 % (ref 0–1.5)
BILIRUB SERPL-MCNC: 0.8 MG/DL (ref 0–1.2)
BUN SERPL-MCNC: 16 MG/DL (ref 8–23)
BUN/CREAT SERPL: 13.6 (ref 7–25)
CALCIUM SPEC-SCNC: 8.5 MG/DL (ref 8.6–10.5)
CHLORIDE SERPL-SCNC: 101 MMOL/L (ref 98–107)
CO2 SERPL-SCNC: 26.9 MMOL/L (ref 22–29)
CREAT SERPL-MCNC: 1.18 MG/DL (ref 0.76–1.27)
DEPRECATED RDW RBC AUTO: 42.7 FL (ref 37–54)
EGFRCR SERPLBLD CKD-EPI 2021: 60.8 ML/MIN/1.73
EOSINOPHIL # BLD AUTO: 1.28 10*3/MM3 (ref 0–0.4)
EOSINOPHIL NFR BLD AUTO: 14.1 % (ref 0.3–6.2)
ERYTHROCYTE [DISTWIDTH] IN BLOOD BY AUTOMATED COUNT: 12.9 % (ref 12.3–15.4)
GLOBULIN UR ELPH-MCNC: 2.5 GM/DL
GLUCOSE SERPL-MCNC: 90 MG/DL (ref 65–99)
HCT VFR BLD AUTO: 33.8 % (ref 37.5–51)
HGB BLD-MCNC: 11.8 G/DL (ref 13–17.7)
IMM GRANULOCYTES # BLD AUTO: 0.02 10*3/MM3 (ref 0–0.05)
IMM GRANULOCYTES NFR BLD AUTO: 0.2 % (ref 0–0.5)
LYMPHOCYTES # BLD AUTO: 1.44 10*3/MM3 (ref 0.7–3.1)
LYMPHOCYTES NFR BLD AUTO: 15.8 % (ref 19.6–45.3)
MCH RBC QN AUTO: 32.1 PG (ref 26.6–33)
MCHC RBC AUTO-ENTMCNC: 34.9 G/DL (ref 31.5–35.7)
MCV RBC AUTO: 91.8 FL (ref 79–97)
MONOCYTES # BLD AUTO: 1.42 10*3/MM3 (ref 0.1–0.9)
MONOCYTES NFR BLD AUTO: 15.6 % (ref 5–12)
NEUTROPHILS NFR BLD AUTO: 4.85 10*3/MM3 (ref 1.7–7)
NEUTROPHILS NFR BLD AUTO: 53.4 % (ref 42.7–76)
NRBC BLD AUTO-RTO: 0 /100 WBC (ref 0–0.2)
PLATELET # BLD AUTO: 225 10*3/MM3 (ref 140–450)
PMV BLD AUTO: 9.2 FL (ref 6–12)
POTASSIUM SERPL-SCNC: 3.5 MMOL/L (ref 3.5–5.2)
PROT SERPL-MCNC: 5.9 G/DL (ref 6–8.5)
QT INTERVAL: 357 MS
RBC # BLD AUTO: 3.68 10*6/MM3 (ref 4.14–5.8)
SODIUM SERPL-SCNC: 139 MMOL/L (ref 136–145)
WBC NRBC COR # BLD: 9.09 10*3/MM3 (ref 3.4–10.8)

## 2022-12-30 PROCEDURE — 80053 COMPREHEN METABOLIC PANEL: CPT | Performed by: INTERNAL MEDICINE

## 2022-12-30 PROCEDURE — 94664 DEMO&/EVAL PT USE INHALER: CPT

## 2022-12-30 PROCEDURE — 97530 THERAPEUTIC ACTIVITIES: CPT | Performed by: PHYSICAL THERAPIST

## 2022-12-30 PROCEDURE — 97530 THERAPEUTIC ACTIVITIES: CPT

## 2022-12-30 PROCEDURE — 25010000002 PIPERACILLIN SOD-TAZOBACTAM PER 1 G: Performed by: INTERNAL MEDICINE

## 2022-12-30 PROCEDURE — 94761 N-INVAS EAR/PLS OXIMETRY MLT: CPT

## 2022-12-30 PROCEDURE — 94799 UNLISTED PULMONARY SVC/PX: CPT

## 2022-12-30 PROCEDURE — 97166 OT EVAL MOD COMPLEX 45 MIN: CPT

## 2022-12-30 PROCEDURE — 85025 COMPLETE CBC W/AUTO DIFF WBC: CPT | Performed by: INTERNAL MEDICINE

## 2022-12-30 PROCEDURE — G0180 MD CERTIFICATION HHA PATIENT: HCPCS | Performed by: INTERNAL MEDICINE

## 2022-12-30 PROCEDURE — 97162 PT EVAL MOD COMPLEX 30 MIN: CPT | Performed by: PHYSICAL THERAPIST

## 2022-12-30 PROCEDURE — 25010000002 FUROSEMIDE PER 20 MG: Performed by: INTERNAL MEDICINE

## 2022-12-30 RX ORDER — GUAIFENESIN 600 MG/1
600 TABLET, EXTENDED RELEASE ORAL EVERY 12 HOURS SCHEDULED
Status: DISCONTINUED | OUTPATIENT
Start: 2022-12-30 | End: 2023-01-06 | Stop reason: HOSPADM

## 2022-12-30 RX ORDER — SODIUM CHLORIDE FOR INHALATION 7 %
4 VIAL, NEBULIZER (ML) INHALATION
Status: DISCONTINUED | OUTPATIENT
Start: 2022-12-30 | End: 2023-01-06 | Stop reason: HOSPADM

## 2022-12-30 RX ORDER — ACETYLCYSTEINE 200 MG/ML
3 SOLUTION ORAL; RESPIRATORY (INHALATION)
Status: DISCONTINUED | OUTPATIENT
Start: 2022-12-30 | End: 2023-01-06 | Stop reason: HOSPADM

## 2022-12-30 RX ORDER — BUDESONIDE 0.5 MG/2ML
0.5 INHALANT ORAL
Status: DISCONTINUED | OUTPATIENT
Start: 2022-12-30 | End: 2023-01-06 | Stop reason: HOSPADM

## 2022-12-30 RX ADMIN — ALBUTEROL SULFATE 2.5 MG: 2.5 SOLUTION RESPIRATORY (INHALATION) at 19:43

## 2022-12-30 RX ADMIN — TAZOBACTAM SODIUM AND PIPERACILLIN SODIUM 3.38 G: 375; 3 INJECTION, SOLUTION INTRAVENOUS at 00:44

## 2022-12-30 RX ADMIN — GABAPENTIN 300 MG: 300 CAPSULE ORAL at 10:06

## 2022-12-30 RX ADMIN — ACETAMINOPHEN 500 MG: 500 TABLET, FILM COATED ORAL at 10:15

## 2022-12-30 RX ADMIN — Medication 10 ML: at 20:10

## 2022-12-30 RX ADMIN — ALBUTEROL SULFATE 2.5 MG: 2.5 SOLUTION RESPIRATORY (INHALATION) at 16:27

## 2022-12-30 RX ADMIN — GABAPENTIN 300 MG: 300 CAPSULE ORAL at 20:10

## 2022-12-30 RX ADMIN — PANTOPRAZOLE SODIUM 40 MG: 40 TABLET, DELAYED RELEASE ORAL at 10:06

## 2022-12-30 RX ADMIN — GUAIFENESIN 600 MG: 600 TABLET, EXTENDED RELEASE ORAL at 22:30

## 2022-12-30 RX ADMIN — POTASSIUM CHLORIDE 20 MEQ: 750 TABLET, EXTENDED RELEASE ORAL at 10:06

## 2022-12-30 RX ADMIN — ACETAMINOPHEN 650 MG: 325 TABLET, FILM COATED ORAL at 19:29

## 2022-12-30 RX ADMIN — TAZOBACTAM SODIUM AND PIPERACILLIN SODIUM 3.38 G: 375; 3 INJECTION, SOLUTION INTRAVENOUS at 16:58

## 2022-12-30 RX ADMIN — METHYLPHENIDATE HYDROCHLORIDE 10 MG: 10 TABLET ORAL at 10:06

## 2022-12-30 RX ADMIN — FUROSEMIDE 80 MG: 10 INJECTION, SOLUTION INTRAMUSCULAR; INTRAVENOUS at 10:06

## 2022-12-30 RX ADMIN — Medication 10 ML: at 10:06

## 2022-12-30 RX ADMIN — FUROSEMIDE 80 MG: 10 INJECTION, SOLUTION INTRAMUSCULAR; INTRAVENOUS at 19:00

## 2022-12-30 RX ADMIN — TAZOBACTAM SODIUM AND PIPERACILLIN SODIUM 3.38 G: 375; 3 INJECTION, SOLUTION INTRAVENOUS at 10:06

## 2022-12-30 RX ADMIN — GABAPENTIN 300 MG: 300 CAPSULE ORAL at 16:58

## 2022-12-30 RX ADMIN — ALBUTEROL SULFATE 2.5 MG: 2.5 SOLUTION RESPIRATORY (INHALATION) at 07:38

## 2022-12-30 RX ADMIN — ALBUTEROL SULFATE 2.5 MG: 2.5 SOLUTION RESPIRATORY (INHALATION) at 03:33

## 2022-12-30 RX ADMIN — ALBUTEROL SULFATE 2.5 MG: 2.5 SOLUTION RESPIRATORY (INHALATION) at 11:20

## 2022-12-30 RX ADMIN — ROPINIROLE HYDROCHLORIDE 0.5 MG: 0.5 TABLET, FILM COATED ORAL at 20:10

## 2022-12-30 NOTE — CASE MANAGEMENT/SOCIAL WORK
Discharge Planning Assessment  [facility]     Patient Name: Tony Leonard  MRN: [id number]  Today's Date: 12/30/2022    Admit Date: 12/29/2022    [facility] will follow   Discharge Needs Assessment     Row Name 12/30/22 1028       Living Environment    People in Home alone    Current Living Arrangements apartment    Primary Care Provided by self    Provides Primary Care For no one    Family Caregiver if Needed child(brian), adult    Family Caregiver Names son, Stef Leonard [phone] ( POA/HCS-AD in [facility]) or Porfirio Leonard ([phone])    Quality of Family Relationships helpful;involved;supportive    Able to Return to Prior Arrangements yes       Resource/Environmental Concerns    Resource/Environmental Concerns none       Transition Planning    Patient/Family Anticipated Services at Transition home health care    Transportation Anticipated family or friend will provide       Discharge Needs Assessment    Equipment Currently Used at Home walker, rolling;wheelchair, motorized;nebulizer;oxygen               Discharge Plan     Row Name 12/30/22 1031       Plan    Plan plans to return to [facility] to follow- CCP will follow    Patient/Family in Agreement with Plan yes    Plan Comments Spoke with patient at bedside.  Introduced self and explained role.  Facesheet verified.  Patient lives in an independent living apartment at [facility].  He has a walker, motorized wheeelchair, nebulizer and has nocturnal o2 from [place].  He is current with [facility] for services.  Referral placed in [facility].  He has been to [facility] in the past for rehab.  Patient states that he currently does not drive, but is able to get rides from family and friends.  His emergency contact is his son, Stef Leonard ( [phone]).  At MA, he plans to return home.  CCP will follow progress with PT. Rose RUFF RN              Continued Care and Services - Admitted Since  12/29/2022    Coordination has not been started for this encounter.     Selected Continued Care - Episodes Includes continued care and service providers with selected services from the active episodes listed below    High Risk Care Management Episode start date: 12/13/2022   There are no active outsourced providers for this episode.             Selected Continued Care - Prior Encounters Includes continued care and service providers with selected services from prior encounters from 9/30/2022 to 12/30/2022    Discharged on 12/12/2022 Admission date: 12/9/2022 - Discharge disposition: Home or Self Care    Home Medical Care     Service Provider Selected Services Address Phone Fax Patient Preferred    [facility] [address] [address] [phone] --                Discharged on 12/1/2022 Admission date: 11/26/2022 - Discharge disposition: Skilled Nursing Facility (DC - External)    Destination     Service Provider Selected Services Address Phone Fax Patient Preferred    [facility] [address] [address] [phone] --                Discharged on 11/23/2022 Admission date: 11/21/2022 - Discharge disposition: Skilled Nursing Facility (DC - External)    Destination     Service Provider Selected Services Address Phone Fax Patient Preferred    [facility] [address] [address] [phone] --                Discharged on 11/19/2022 Admission date: 11/13/2022 - Discharge disposition: Home-Health Care [facility]    Home Medical Care     Service Provider Selected Services Address Phone Fax Patient Preferred    [facility] [address] [address] [phone] --                       Demographic Summary     Row Name 12/30/22 1028       General Information     Admission Type inpatient    Arrived From emergency department    Required Notices Provided Observation Status Notice    Referral Source admission list    Reason for Consult discharge planning    Preferred Language English               Functional Status     Row Name 12/30/22 1028       Functional Status    Usual Activity Tolerance fair    Current Activity Tolerance fair       Functional Status, IADL    Medications independent    Meal Preparation independent    Housekeeping independent    Laundry independent    Shopping assistive person       Mental Status    General Appearance WDL WDL               Psychosocial    No documentation.                Abuse/Neglect    No documentation.                Legal    No documentation.                Substance Abuse    No documentation.                Patient Forms    No documentation.                   Rose Castaneda RN

## 2022-12-30 NOTE — PLAN OF CARE
Goal Outcome Evaluation:  Plan of Care Reviewed With: patient           Outcome Evaluation: Pt is a 82 y.o male admitted from home with acute respiratory failure with hypoxia. He lives in independent living and uses a rollator and is working with HH PT. He has PMH of COPD, (wears O2 at night and napping during the day), bronchiectasis, severe spinal stenosis, CHF, multiple recent hospitilizations in last few months for sepsis, respiratory failure, saddle pulmonary embolus on Eliqius. Pt today is able to transfer to chair min-CGA for mobility efforts with rwx. Pt with RODRIGUEZ with all activites and poor endurance to complete further ADLs today as well as poor standing tolerance and complete a few basic ADLs set up in chair such as grooming and toileting with urine. Pt O2 checked throughout and maintains above 90% but needs rest breaks to catch breath. He reports his goal is to dc back home. He does have a motorized w/c he can use per chart. Anticipate dc home with HH vs SNF pending progress while admitted.    OT wore appropriate PPE and completed hand hygiene.

## 2022-12-30 NOTE — PLAN OF CARE
Goal Outcome Evaluation:  Plan of Care Reviewed With: patient           Outcome Evaluation: Pt admitted from [facility] living with respiratory failure and PE. Pt reports he ambualtes with rollator and has a motorized wheelchair if needed. Pt was sitting up in chair and agreeable to therapy. Pt stood with min A and was able to take a few steps forward and backward with Rwx. Distance was limited by lighhtheadedness and SOA. Pt would benefit from PT to address these impairments.

## 2022-12-30 NOTE — DISCHARGE PLACEMENT REQUEST
"Tony Casey (82 y.o. Male)     Date of Birth   1940    Social Security Number       Address   [address] [place] [address]    Home Phone   [phone]    MRN   [id number]       Scientologist   Sabianist    Marital Status   Single                            Admission Date   12/29/22    Admission Type   Emergency    Admitting Provider   Danni Frye MD    Attending Provider   Stone Shukla MD    Department, Room/Bed   [address], S[address]/1       Discharge Date       Discharge Disposition       Discharge Destination                               Attending Provider: Stone Shukla MD    Allergies: Daliresp [Roflumilast], Latex, Sulfa Antibiotics    Isolation: None   Infection: None   Code Status: CPR    Ht: 180.3 cm (71\")   Wt: 79.8 kg (176 lb)    Admission Cmt: None   Principal Problem: Acute on chronic respiratory failure with hypoxia (HCC) [J96.21]                 Active Insurance as of 12/29/2022     Primary Coverage     Payor Plan Insurance Group Employer/Plan Group    MEDICARE MEDICARE A & B      Payor Plan Address Payor Plan Phone Number Payor Plan Fax Number Effective Dates    [address] [phone]  5/1/2003 - None Entered    [facility] [id number]       Subscriber Name Subscriber Birth Date Member ID       TONY CASEY 1940 [id number]           Secondary Coverage     Payor Plan Insurance Group Employer/Plan Group     FOR LIFE  FOR LIFE  SUP       Payor Plan Address Payor Plan Phone Number Payor Plan Fax Number Effective Dates    [address] [phone]  3/10/2016 - None Entered    [facility] [address]       Subscriber Name Subscriber Birth Date Member ID       TONY CASEY 1940 [id number]                 Emergency Contacts      (Rel.) Home Phone Work Phone Mobile Phone    VICKY CASEY (Son) [phone] -- [phone]    Frederick Casey (Son) [phone] -- --    Ricardo Casey (Son) [phone] " -- [phone]

## 2022-12-30 NOTE — THERAPY EVALUATION
Patient Name: Tony Leonard  : 1940    MRN: [id number]                              Today's Date: 2022       Admit Date: 2022    Visit Dx:     ICD-10-CM ICD-9-CM   1. Pneumonia of right upper lobe due to infectious organism  J18.9 486   2. Other acute pulmonary embolism without acute cor pulmonale ([facility])  I26.99 415.19   3. Acute on chronic congestive heart failure, unspecified heart failure type ([facility])  I50.9 428.0     Patient Active Problem List   Diagnosis   • Thrush, oral   • ADD (attention deficit disorder)   • Hemorrhoids   • Bronchiectasis with acute lower respiratory infection ([facility])   • Diverticul disease small and large intestine, no perforati or abscess   • Benign non-nodular prostatic hyperplasia without lower urinary tract symptoms   • Gastroesophageal reflux disease   • Malaise and fatigue   • Environmental allergies   • Hemoptysis   • Dyslipidemia   • Primary osteoarthritis involving multiple joints   • Pneumonia of right lower lobe due to infectious organism   • Abnormal EKG   • COPD (chronic obstructive pulmonary disease) ([facility])   • Mild malnutrition ([facility])   • Acute on chronic respiratory failure with hypoxia ([facility])   • Fracture, intertrochanteric, right femur, closed, initial encounter ([facility])   • Chronic diastolic CHF (congestive heart failure) ([facility])   • Hematoma of frontal scalp   • Postoperative anemia due to acute blood loss   • Urinary retention   • Hemorrhagic disorder due to circulating anticoagulants ([facility])   • History of fracture of right hip   • Closed fracture of right hip with routine healing   • Chronic low back pain with sciatica   • Change in bowel function   • Rectal bleeding   • Prostate cancer ([facility])   • On home oxygen therapy   • Acute viral bronchitis   • Chronic diastolic (congestive) heart failure ([facility])   • Peripheral neuropathy   • Plantar fasciitis   • COPD exacerbation ([facility])   • Bilateral lower extremity edema   • Microscopic hematuria   • Acute midline low  back pain with right-sided sciatica   • Spinal stenosis, lumbar region with neurogenic claudication   • Compression deformity of vertebra   • Rectal bleed   • Esophagitis   • Angiectasia   • Hiatal hernia   • Overweight (BMI 25.0-29.9)   • Leukocytosis   • Bilateral hip pain   • Elevated troponin level not due myocardial infarction   • Nocturnal hypoxia   • Acute saddle pulmonary embolism without acute cor pulmonale ([facility]) - 12/11/2022   • Pneumonia of right upper lobe due to infectious organism     Past Medical History:   Diagnosis Date   • ADHD (attention deficit hyperactivity disorder)    • Bronchiectasis ([facility])    • CHF (congestive heart failure) ([facility])    • Colon polyp    • COPD (chronic obstructive pulmonary disease) ([facility])    • Diverticulosis    • Hard of hearing    • On home oxygen therapy     2 LITERS   • Pneumonia    • Prostate cancer ([facility]) 04/22/2019   • Spinal stenosis, lumbar region with neurogenic claudication 08/02/2022     Past Surgical History:   Procedure Laterality Date   • BRONCHOSCOPY N/A 4/30/2016    Procedure: BRONCHOSCOPY with BAL of right lower lobe and left  lower lobe.  ;  Surgeon: Nando Diaz MD;  Location: [facility] ENDOSCOPY;  Service:    • BRONCHOSCOPY N/A 4/28/2017    Procedure: BRONCHOSCOPY;  Surgeon: Katharina Salter MD;  Location: [facility] ENDOSCOPY;  Service:    • BRONCHOSCOPY Bilateral 6/29/2017    Procedure: BRONCHOSCOPY WITH WASHINGS;  Surgeon: Katharina Salter MD;  Location: [facility] ENDOSCOPY;  Service:    • BRONCHOSCOPY N/A 1/22/2018    Procedure: BRONCHOSCOPY AT BEDSIDE with BAL;  Surgeon: Katharina Salter MD;  Location: [facility] ENDOSCOPY;  Service:    • BRONCHOSCOPY N/A 1/30/2018    Procedure: BRONCHOSCOPY with BAL and washing;  Surgeon: Shreyas Wild MD;  Location: [facility] ENDOSCOPY;  Service:    • BRONCHOSCOPY Bilateral 4/24/2018    Procedure: BRONCHOSCOPY WITH WASHING;  Surgeon: Katharina Salter MD;  Location: [facility] ENDOSCOPY;  Service: Pulmonary   • BRONCHOSCOPY N/A 12/28/2019     Procedure: BRONCHOSCOPY with bilateral washing;  Surgeon: Katharina Salter MD;  Location: [facility] ENDOSCOPY;  Service: Pulmonary   • COLONOSCOPY  05/17/2013    eh, ih, tort, sig tics   • COLONOSCOPY N/A 5/10/2019    Non-thrombosed external hemorrhoids found on perianal exam, Diverticulosis, Tortuous colon, One 5 mm polyp in the mid ascending colon, IH. Path: Tubular adenoma.    • COLONOSCOPY N/A 9/24/2022    Procedure: COLONOSCOPY to cecum and TI with argon plasma coagulation.;  Surgeon: Bruce Black MD;  Location[facility] ENDOSCOPY;  Service: Gastroenterology;  Laterality: N/A;  pre- GI bleeding  post- radiation proctitis, diverticulosis   • ENDOSCOPY  03/19/2015    z line irreg, hh   • ENDOSCOPY N/A 9/24/2022    Procedure: ESOPHAGOGASTRODUODENOSCOPY;  Surgeon: Bruce Black MD;  Location: [facility] ENDOSCOPY;  Service: Gastroenterology;  Laterality: N/A;  pre- GI bleeding  post- esophagitis, hiatal hernia   • HIP OPEN REDUCTION Right 1/17/2018    Procedure: HIP OPEN REDUCTION INTERNAL FIXATION WITH DYNAMIC HIP SCREW;  Surgeon: Les Black MD;  Location: [facility] MAIN OR;  Service:    • SPINE SURGERY     • TONSILLECTOMY     • TOTAL HIP ARTHROPLASTY REVISION Right 9/23/2019    Procedure: HIP  REVISION RIGHT;  Surgeon: Isauro Warner MD;  Location: [facility] MAIN OR;  Service: Orthopedics      General Information     Row Name 12/30/22 1147          OT Time and Intention    Document Type evaluation  -SM     Mode of Treatment occupational therapy;individual therapy  -     Row Name 12/30/22 114          General Information    Patient Profile Reviewed yes  -SM     Prior Level of Function independent:;ADL's;all household mobility  -SM     Existing Precautions/Restrictions fall;oxygen therapy device and L/min  -SM     Row Name 12/30/22 7129          Occupational Profile    Environmental Supports and Barriers (Occupational Profile) Pt uses a rollator, also has a motorized scooter, uses sock aid and reacher for  dressing  -[facility] Name 12/30/22 1147          Living Environment    People in Home alone  independent living  -[facility] Name 12/30/22 1147          Cognition    Orientation Status (Cognition) oriented x 4  -[facility] Name 12/30/22 1147          Safety Issues, Functional Mobility    Impairments Affecting Function (Mobility) balance;range of motion (ROM);pain;strength;shortness of breath;endurance/activity tolerance  -           User Key  (r) = Recorded By, (t) = Taken By, (c) = Cosigned By    Initials Name Provider Type     Madelyn Em OT Occupational Therapist                 Mobility/ADL's     [facility] Name 12/30/22 1152          Bed Mobility    Bed Mobility supine-sit  -     Supine-Sit [place] (Bed Mobility) minimum assist (75% patient effort);verbal cues  -     Assistive Device (Bed Mobility) head of bed elevated;bed rails  -     Comment, (Bed Mobility) extra time to complete  -SM     Row Name 12/30/22 1152          Transfers    Transfers sit-stand transfer;bed-chair transfer  -[facility] 12/30/22 1152          Bed-Chair Transfer    Bed-Chair [place] (Transfers) contact guard  -     Assistive Device (Bed-Chair Transfers) walker, front-wheeled  -[facility] Name 12/30/22 1152          Sit-Stand Transfer    Sit-Stand [place] (Transfers) contact guard  -     Assistive Device (Sit-Stand Transfers) walker, front-wheeled  -[facility] Name 12/30/22 1152          Functional Mobility    Functional Mobility- Comment pt reports too fatiqued to attempt any further than a few steps to chair today  -[facility] Name 12/30/22 1152          Activities of Daily Living    BADL Assessment/Intervention lower body dressing;grooming;toileting  -[facility] Name 12/30/22 1152          Lower Body Dressing Assessment/Training    Comment, (Lower Body Dressing) RODRIGUEZ limiting, pt uses AE at baseline to complete  -SM     Row Name 12/30/22 1152          Grooming Assessment/Training    [place] Level  (Grooming) grooming skills;set up  -SM     Position (Grooming) supported sitting  -SM     Row Name 12/30/22 1152          Toileting Assessment/Training    [place] Level (Toileting) toileting skills;set up  -SM     Assistive Devices (Toileting) urinal  -SM     Position (Toileting) supported sitting  -SM     Comment, (Toileting) anticipate pt would need increased assist with any toileting effort on BSC or in bathroom  -           User Key  (r) = Recorded By, (t) = Taken By, (c) = Cosigned By    Initials Name Provider Type    Madelyn Dueñas OT Occupational Therapist               Obj/Interventions     Row Name 12/30/22 1155          Range of Motion Comprehensive    General Range of Motion bilateral upper extremity ROM WFL  -     Row Name 12/30/22 1155          Strength Comprehensive (MMT)    Comment, General Manual Muscle Testing (MMT) Assessment BUE 4-/5  -     Row Name 12/30/22 1155          Motor Skills    Motor Skills functional endurance  -     Functional Endurance poor  -     Row Name 12/30/22 1155          Balance    Balance Assessment sitting static balance;standing static balance;standing dynamic balance  -     Static Sitting Balance standby assist  -     Position, Sitting Balance sitting edge of bed  -     Static Standing Balance contact guard  -     Dynamic Standing Balance contact guard  -     Position/Device Used, Standing Balance supported;walker, rolling  -           User Key  (r) = Recorded By, (t) = Taken By, (c) = Cosigned By    Initials Name Provider Type    Madelyn Dueñas OT Occupational Therapist               Goals/Plan     Row Name 12/30/22 1206          Transfer Goal 1 (OT)    Activity/Assistive Device (Transfer Goal 1, OT) transfers, all;toilet;commode, bedside without drop arms  -     [place] Level/Cues Needed (Transfer Goal 1, OT) supervision required  -     Time Frame (Transfer Goal 1, OT) short term goal (STG);2 weeks  -      Progress/Outcome (Transfer Goal 1, OT) goal ongoing  -SM     Row Name 12/30/22 1206          Toileting Goal 1 (OT)    Activity/Device (Toileting Goal 1, OT) toileting skills, all  -SM     [place] Level/Cues Needed (Toileting Goal 1, OT) supervision required  -SM     Time Frame (Toileting Goal 1, OT) short term goal (STG);2 weeks  -SM     Progress/Outcome (Toileting Goal 1, OT) goal ongoing  -SM     Row Name 12/30/22 1206          Grooming Goal 1 (OT)    Activity/Device (Grooming Goal 1, OT) grooming skills, all  -SM     [place] (Grooming Goal 1, OT) supervision required  -SM     Time Frame (Grooming Goal 1, OT) short term goal (STG);2 weeks  -SM     Strategies/Barriers (Grooming Goal 1, OT) standing > 3 minutes to increase standing tolerance for ADLs.  -SM     Progress/Outcome (Grooming Goal 1, OT) goal ongoing  -     Row Name 12/30/22 1206          Therapy Assessment/Plan (OT)    Planned Therapy Interventions (OT) activity tolerance training;adaptive equipment training;BADL retraining;functional balance retraining;occupation/activity based interventions;patient/caregiver education/training;transfer/mobility retraining;strengthening exercise  -           User Key  (r) = Recorded By, (t) = Taken By, (c) = Cosigned By    Initials Name Provider Type     Madelyn Em OT Occupational Therapist               Clinical Impression     Row Name 12/30/22 1154          Pain Assessment    Pretreatment Pain Rating 10/10  -SM     Posttreatment Pain Rating 10/10  -SM     Pre/Posttreatment Pain Comment pt reports chronic pain in back and hips  -     Pain Intervention(s) Repositioned;Ambulation/increased activity  -     Row Name 12/30/22 1151          Plan of Care Review    Plan of Care Reviewed With patient  -SM     Outcome Evaluation Pt is a 82 y.o male admitted from home with acute respiratory failure with hypoxia. He lives in independent living and uses a rollator and is working with  PT. He has PMH of  COPD, (wears O2 at night and napping during the day), bronchiectasis, severe spinal stenosis, CHF, multiple recent hospitilizations in last few months for sepsis, respiratory failure, saddle pulmonary embolus on Eliqius. Pt today is able to transfer to chair min-CGA for mobility efforts with rwx. Pt with RODRIGUEZ with all activites and poor endurance to complete further ADLs today as well as poor standing tolerance and complete a few basic ADLs set up in chair such as grooming and toileting with urine. Pt O2 checked throughout and maintains above 90% but needs rest breaks to catch breath. He reports his goal is to dc back home. He does have a motorized w/c he can use per chart. Anticipate dc home with  vs SNF pending progress while admitted.  -     Row Name 12/30/22 1156          Therapy Assessment/Plan (OT)    Rehab Potential (OT) good, to achieve stated therapy goals  -     Criteria for Skilled Therapeutic Interventions Met (OT) yes;skilled treatment is necessary  -     Therapy Frequency (OT) 5 times/wk  -     Row Name 12/30/22 1156          Therapy Plan Review/Discharge Plan (OT)    Anticipated Discharge Disposition (OT) skilled nursing facility;home with home health  -     Row Name 12/30/22 1156          Vital Signs    Pre SpO2 (%) 94  5L  -SM     O2 Delivery Pre Treatment hi-flow  -SM     Intra SpO2 (%) 94  -SM     O2 Delivery Intra Treatment hi-flow  -SM     Post SpO2 (%) 99  -SM     O2 Delivery Post Treatment hi-flow  -     Row Name 12/30/22 1155          Positioning and Restraints    Pre-Treatment Position in bed  -SM     Post Treatment Position chair  -SM     In Chair reclined;call light within reach;encouraged to call for assist;exit alarm on;notified [facility] [place] [facility]  -           User Key  (r) = Recorded By, (t) = Taken By, (c) = Cosigned By    Initials Name Provider Type    Madelyn Dueñas, OT Occupational Therapist               Outcome Measures     Row Name 12/30/22 0708          How much help from  another is currently needed...    Putting on and taking off regular lower body clothing? 2  -SM     Bathing (including washing, rinsing, and drying) 2  -SM     Toileting (which includes using toilet bed pan or urinal) 3  -SM     Putting on and taking off regular upper body clothing 3  -SM     Taking care of personal grooming (such as brushing teeth) 3  -SM     Eating meals 4  -SM     AM-PAC 6 Clicks Score (OT) 17  -     Row Name 12/30/22 1208          Functional Assessment    Outcome Measure Options AM-PAC 6 Clicks Daily Activity (OT)  -           User Key  (r) = Recorded By, (t) = Taken By, (c) = Cosigned By    Initials Name Provider Type    Madelyn Dueñas OT Occupational Therapist                Occupational Therapy Education     Title: PT OT SLP Therapies (In Progress)     Topic: Occupational Therapy (In Progress)     Point: ADL training (Done)     Description:   Instruct learner(s) on proper safety adaptation and remediation techniques during self care or transfers.   Instruct in proper use of assistive devices.              Learning Progress Summary           Patient Acceptance, E, VU by  at 12/30/2022 1209    Comment: OT goals, pacing activity, PLB technique                   Point: Home exercise program (Not Started)     Description:   Instruct learner(s) on appropriate technique for monitoring, assisting and/or progressing therapeutic exercises/activities.              Learner Progress:  Not documented in this visit.          Point: Precautions (Not Started)     Description:   Instruct learner(s) on prescribed precautions during self-care and functional transfers.              Learner Progress:  Not documented in this visit.          Point: Body mechanics (Not Started)     Description:   Instruct learner(s) on proper positioning and spine alignment during self-care, functional mobility activities and/or exercises.              Learner Progress:  Not documented in this visit.                       User Key     Initials Effective Dates Name Provider Type Discipline     04/02/20 -  Madelyn Em OT Occupational Therapist OT              OT Recommendation and Plan  Planned Therapy Interventions (OT): activity tolerance training, adaptive equipment training, BADL retraining, functional balance retraining, occupation/activity based interventions, patient/caregiver education/training, transfer/mobility retraining, strengthening exercise  Therapy Frequency (OT): 5 times/wk  Plan of Care Review  Plan of Care Reviewed With: patient  Outcome Evaluation: Pt is a 82 y.o male admitted from home with acute respiratory failure with hypoxia. He lives in independent living and uses a rollator and is working with HH PT. He has PMH of COPD, (wears O2 at night and napping during the day), bronchiectasis, severe spinal stenosis, CHF, multiple recent hospitilizations in last few months for sepsis, respiratory failure, saddle pulmonary embolus on Eliqius. Pt today is able to transfer to chair min-CGA for mobility efforts with rwx. Pt with RODRIGUEZ with all activites and poor endurance to complete further ADLs today as well as poor standing tolerance and complete a few basic ADLs set up in chair such as grooming and toileting with urine. Pt O2 checked throughout and maintains above 90% but needs rest breaks to catch breath. He reports his goal is to dc back home. He does have a motorized w/c he can use per chart. Anticipate dc home with HH vs SNF pending progress while admitted.     Time Calculation:    Time Calculation- OT     Row Name 12/30/22 1209             Time Calculation- OT    OT Start Time 1051  -      OT Stop Time 1108  -      OT Time Calculation (min) 17 min  -      Total Timed Code Minutes- OT 10 minute(s)  -      OT Received On 12/30/22  -      OT - Next Appointment 01/02/23  -      OT Goal Re-Cert Due Date 01/13/23  -         Timed Charges    72954 - OT Therapeutic Activity Minutes 10  -          Untimed Charges    OT Eval/Re-eval Minutes 7  -SM         Total Minutes    Timed Charges Total Minutes 10  -SM      Untimed Charges Total Minutes 7  -SM       Total Minutes 17  -SM            User Key  (r) = Recorded By, (t) = Taken By, (c) = Cosigned By    Initials Name Provider Type    Madelyn Dueñas OT Occupational Therapist              Therapy Charges for Today     Code Description Service Date Service Provider Modifiers Qty    [id number]  OT THERAPEUTIC ACT EA 15 MIN 12/30/2022 Madelyn Em OT GO 1    [id number] HC OT EVAL MOD COMPLEXITY 2 12/30/2022 Madelyn Em OT GO 1               Madelyn Em OT  12/30/2022

## 2022-12-30 NOTE — NURSING NOTE
Diuretic in Use: lasix  Response to Diuretics: Output greater than intake  Daily Weight: VONDA, pt new admit  O2 Requirements: 3L  Functional Status:  SOA with activity   Discharge Plans: TBD

## 2022-12-30 NOTE — THERAPY EVALUATION
Patient Name: Tony Leonard  : 1940    MRN: [id number]                              Today's Date: 2022       Admit Date: 2022    Visit Dx:     ICD-10-CM ICD-9-CM   1. Pneumonia of right upper lobe due to infectious organism  J18.9 486   2. Other acute pulmonary embolism without acute cor pulmonale ([facility])  I26.99 415.19   3. Acute on chronic congestive heart failure, unspecified heart failure type ([facility])  I50.9 428.0     Patient Active Problem List   Diagnosis   • Thrush, oral   • ADD (attention deficit disorder)   • Hemorrhoids   • Bronchiectasis with acute lower respiratory infection ([facility])   • Diverticul disease small and large intestine, no perforati or abscess   • Benign non-nodular prostatic hyperplasia without lower urinary tract symptoms   • Gastroesophageal reflux disease   • Malaise and fatigue   • Environmental allergies   • Hemoptysis   • Dyslipidemia   • Primary osteoarthritis involving multiple joints   • Pneumonia of right lower lobe due to infectious organism   • Abnormal EKG   • COPD (chronic obstructive pulmonary disease) ([facility])   • Mild malnutrition ([facility])   • Acute on chronic respiratory failure with hypoxia ([facility])   • Fracture, intertrochanteric, right femur, closed, initial encounter ([facility])   • Chronic diastolic CHF (congestive heart failure) ([facility])   • Hematoma of frontal scalp   • Postoperative anemia due to acute blood loss   • Urinary retention   • Hemorrhagic disorder due to circulating anticoagulants ([facility])   • History of fracture of right hip   • Closed fracture of right hip with routine healing   • Chronic low back pain with sciatica   • Change in bowel function   • Rectal bleeding   • Prostate cancer ([facility])   • On home oxygen therapy   • Acute viral bronchitis   • Chronic diastolic (congestive) heart failure ([facility])   • Peripheral neuropathy   • Plantar fasciitis   • COPD exacerbation ([facility])   • Bilateral lower extremity edema   • Microscopic hematuria   • Acute midline low  back pain with right-sided sciatica   • Spinal stenosis, lumbar region with neurogenic claudication   • Compression deformity of vertebra   • Rectal bleed   • Esophagitis   • Angiectasia   • Hiatal hernia   • Overweight (BMI 25.0-29.9)   • Leukocytosis   • Bilateral hip pain   • Elevated troponin level not due myocardial infarction   • Nocturnal hypoxia   • Acute saddle pulmonary embolism without acute cor pulmonale ([facility]) - 12/11/2022   • Pneumonia of right upper lobe due to infectious organism     Past Medical History:   Diagnosis Date   • ADHD (attention deficit hyperactivity disorder)    • Bronchiectasis ([facility])    • CHF (congestive heart failure) ([facility])    • Colon polyp    • COPD (chronic obstructive pulmonary disease) ([facility])    • Diverticulosis    • Hard of hearing    • On home oxygen therapy     2 LITERS   • Pneumonia    • Prostate cancer ([facility]) 04/22/2019   • Spinal stenosis, lumbar region with neurogenic claudication 08/02/2022     Past Surgical History:   Procedure Laterality Date   • BRONCHOSCOPY N/A 4/30/2016    Procedure: BRONCHOSCOPY with BAL of right lower lobe and left  lower lobe.  ;  Surgeon: Nando Diaz MD;  Location: [facility] ENDOSCOPY;  Service:    • BRONCHOSCOPY N/A 4/28/2017    Procedure: BRONCHOSCOPY;  Surgeon: Katharina Salter MD;  Location: [facility] ENDOSCOPY;  Service:    • BRONCHOSCOPY Bilateral 6/29/2017    Procedure: BRONCHOSCOPY WITH WASHINGS;  Surgeon: Katharina Salter MD;  Location: [facility] ENDOSCOPY;  Service:    • BRONCHOSCOPY N/A 1/22/2018    Procedure: BRONCHOSCOPY AT BEDSIDE with BAL;  Surgeon: Katharina Salter MD;  Location: [facility] ENDOSCOPY;  Service:    • BRONCHOSCOPY N/A 1/30/2018    Procedure: BRONCHOSCOPY with BAL and washing;  Surgeon: Shreyas Wild MD;  Location: [facility] ENDOSCOPY;  Service:    • BRONCHOSCOPY Bilateral 4/24/2018    Procedure: BRONCHOSCOPY WITH WASHING;  Surgeon: Katharina Salter MD;  Location: [facility] ENDOSCOPY;  Service: Pulmonary   • BRONCHOSCOPY N/A 12/28/2019     Procedure: BRONCHOSCOPY with bilateral washing;  Surgeon: Katharina Salter MD;  Location: [facility] ENDOSCOPY;  Service: Pulmonary   • COLONOSCOPY  05/17/2013    eh, ih, tort, sig tics   • COLONOSCOPY N/A 5/10/2019    Non-thrombosed external hemorrhoids found on perianal exam, Diverticulosis, Tortuous colon, One 5 mm polyp in the mid ascending colon, IH. Path: Tubular adenoma.    • COLONOSCOPY N/A 9/24/2022    Procedure: COLONOSCOPY to cecum and TI with argon plasma coagulation.;  Surgeon: Bruce Black MD;  Location: [facility] ENDOSCOPY;  Service: Gastroenterology;  Laterality: N/A;  pre- GI bleeding  post- radiation proctitis, diverticulosis   • ENDOSCOPY  03/19/2015    z line irreg, hh   • ENDOSCOPY N/A 9/24/2022    Procedure: ESOPHAGOGASTRODUODENOSCOPY;  Surgeon: Bruce Black MD;  Location: [facility] ENDOSCOPY;  Service: Gastroenterology;  Laterality: N/A;  pre- GI bleeding  post- esophagitis, hiatal hernia   • HIP OPEN REDUCTION Right 1/17/2018    Procedure: HIP OPEN REDUCTION INTERNAL FIXATION WITH DYNAMIC HIP SCREW;  Surgeon: Les Black MD;  Location: [facility] MAIN OR;  Service:    • SPINE SURGERY     • TONSILLECTOMY     • TOTAL HIP ARTHROPLASTY REVISION Right 9/23/2019    Procedure: HIP  REVISION RIGHT;  Surgeon: Isauro Warner MD;  Location: [facility] MAIN OR;  Service: Orthopedics      General Information     Row Name 12/30/22 1622          Physical Therapy Time and Intention    Document Type evaluation  -     Mode of Treatment individual therapy;physical therapy  -     Row Name 12/30/22 1622          General Information    Patient Profile Reviewed yes  -     Prior Level of Function independent:  -     Existing Precautions/Restrictions fall;oxygen therapy device and L/min  -     Row Name 12/30/22 1622          Living Environment    People in Home alone  -     Row Name 12/30/22 1622          Cognition    Orientation Status (Cognition) oriented x 4  -     Row Name 12/30/22 1622          Safety  Issues, Functional Mobility    Impairments Affecting Function (Mobility) balance;range of motion (ROM);pain;strength;shortness of breath;endurance/activity tolerance  -           User Key  (r) = Recorded By, (t) = Taken By, (c) = Cosigned By    Initials Name Provider Type    Ann Serrano, PT Physical Therapist               Mobility     Row Name 12/30/22 1624          Bed Mobility    Comment, (Bed Mobility) UIC  -     Row Name 12/30/22 1624          Sit-Stand Transfer    Sit-Stand [place] (Transfers) contact guard  -     Assistive Device (Sit-Stand Transfers) walker, front-wheeled  -     Row Name 12/30/22 1624          Gait/Stairs (Locomotion)    [place] Level (Gait) contact guard  -     Assistive Device (Gait) walker, front-wheeled  -     Distance in Feet (Gait) 5 ft forward/backward  -     Deviations/Abnormal Patterns (Gait) antalgic;gait speed decreased;stride length decreased  -     Comment, (Gait/Stairs) ambulation distance limited by lightheadedness and SOA  -           User Key  (r) = Recorded By, (t) = Taken By, (c) = Cosigned By    Initials Name Provider Type    Ann Serrano, PT Physical Therapist               Obj/Interventions     Row Name 12/30/22 1627          Range of Motion Comprehensive    Comment, General Range of Motion BLE WFL  -     Row Name 12/30/22 1627          Strength Comprehensive (MMT)    Comment, General Manual Muscle Testing (MMT) Assessment BLE WFL  -     Row Name 12/30/22 1627          Motor Skills    Therapeutic Exercise --  BLE AP, LAQ, seated marching x 10 reps  -     Row Name 12/30/22 1627          Balance    Static Sitting Balance standby assist  -     Position, Sitting Balance sitting in chair  -KH     Static Standing Balance contact guard  -     Dynamic Standing Balance contact guard  -KH     Position/Device Used, Standing Balance walker, rolling  -           User Key  (r) = Recorded By, (t) = Taken By, (c) =  Cosigned By    Initials Name Provider Type    Ann Serrano, PT Physical Therapist               Goals/Plan    No documentation.                Clinical Impression     Row Name 12/30/22 1628          Pain    Pretreatment Pain Rating 7/10  -     Posttreatment Pain Rating 8/10  -     Pain Location - back;hip  -     Pain Intervention(s) Repositioned;Ambulation/increased activity  -     Row Name 12/30/22 1628          Plan of Care Review    Plan of Care Reviewed With patient  -     Outcome Evaluation Pt admitted from [facility] living with respiratory failure and PE. Pt reports he ambualtes with rollator and has a motorized wheelchair if needed. Pt was sitting up in chair and agreeable to therapy. Pt stood with min A and was able to take a few steps forward and backward with Rwx. Distance was limited by lighhtheadedness and SOA. Pt would benefit from PT to address these impairments.  -     Row Name 12/30/22 1628          Therapy Assessment/Plan (PT)    Patient/Family Therapy Goals Statement (PT) Pt is unsure of plans at this time  -     Rehab Potential (PT) good, to achieve stated therapy goals  -     Criteria for Skilled Interventions Met (PT) yes  -     Therapy Frequency (PT) 6 times/wk  -     Row Name 12/30/22 1628          Positioning and Restraints    Pre-Treatment Position sitting in chair/recliner  -     Post Treatment Position chair  -     In Chair reclined;call light within reach;encouraged to call for assist  -           User Key  (r) = Recorded By, (t) = Taken By, (c) = Cosigned By    Initials Name Provider Type    Ann Serrano, PT Physical Therapist               Outcome Measures     Row Name 12/30/22 1633          How much help from another person do you currently need...    Turning from your back to your side while in flat bed without using bedrails? 3  -     Moving from lying on back to sitting on the side of a flat bed without bedrails? 3  -KH     Moving to  and from a bed to a chair (including a wheelchair)? 3  -KH     Standing up from a chair using your arms (e.g., wheelchair, bedside chair)? 3  -KH     Climbing 3-5 steps with a railing? 2  -KH     To walk in hospital room? 3  -KH     AM-PAC 6 Clicks Score (PT) 17  -     Highest level of mobility 5 --> Static standing  -     Row Name 12/30/22 1633 12/30/22 1208       Functional Assessment    Outcome Measure Options AM-PAC 6 Clicks Basic Mobility (PT)  - AM-PAC 6 Clicks Daily Activity (OT)  -          User Key  (r) = Recorded By, (t) = Taken By, (c) = Cosigned By    Initials Name Provider Type    Ann Serrano, PT Physical Therapist    Madelyn Dueñas, OT Occupational Therapist                             Physical Therapy Education     Title: PT OT SLP Therapies (In Progress)     Topic: Physical Therapy (Done)     Point: Mobility training (Done)     Learning Progress Summary           Patient Acceptance, E, VU,NR by  at 12/30/2022 1634                   Point: Home exercise program (Done)     Learning Progress Summary           Patient Acceptance, E, VU,NR by  at 12/30/2022 1634                   Point: Body mechanics (Done)     Learning Progress Summary           Patient Acceptance, E, VU,NR by  at 12/30/2022 1634                   Point: Precautions (Done)     Learning Progress Summary           Patient Acceptance, E, VU,NR by  at 12/30/2022 1634                               User Key     Initials Effective Dates Name Provider Type Discipline     06/16/21 -  Ann Serna, PT Physical Therapist PT              PT Recommendation and Plan     Plan of Care Reviewed With: patient  Outcome Evaluation: Pt admitted from [facility] living with respiratory failure and PE. Pt reports he ambualtes with rollator and has a motorized wheelchair if needed. Pt was sitting up in chair and agreeable to therapy. Pt stood with min A and was able to take a few steps forward and backward with Rwx.  Distance was limited by lighhtheadedness and SOA. Pt would benefit from PT to address these impairments.     Time Calculation:    PT Charges     Row Name 12/30/22 1634             Time Calculation    Start Time 1520  -      Stop Time 1537  -KH      Time Calculation (min) 17 min  -KH      PT Received On 12/30/22  -KH      PT - Next Appointment 12/31/22  -      PT Goal Re-Cert Due Date 01/05/23  -         Time Calculation- PT    Total Timed Code Minutes- PT 8 minute(s)  -         Timed Charges    44535 - PT Therapeutic Activity Minutes 8  -KH         Untimed Charges    PT Eval/Re-eval Minutes 9  -KH         Total Minutes    Timed Charges Total Minutes 8  -KH      Untimed Charges Total Minutes 9  -KH       Total Minutes 17  -KH            User Key  (r) = Recorded By, (t) = Taken By, (c) = Cosigned By    Initials Name Provider Type    Ann Serrano, PT Physical Therapist              Therapy Charges for Today     Code Description Service Date Service Provider Modifiers Qty    70884654907 HC PT THERAPEUTIC ACT EA 15 MIN 12/30/2022 Ann Serna, PT GP 1    12656248963 HC PT EVAL MOD COMPLEXITY 2 12/30/2022 Ann Serna, PT GP 1          PT G-Codes  Outcome Measure Options: AM-PAC 6 Clicks Basic Mobility (PT)  AM-PAC 6 Clicks Score (PT): 17  AM-PAC 6 Clicks Score (OT): 17  PT Discharge Summary  Anticipated Discharge Disposition (PT): home with home health, skilled nursing facility    Ann Serna PT  12/30/2022

## 2022-12-30 NOTE — PLAN OF CARE
Goal Outcome Evaluation:           Progress: no change  Outcome Evaluation: A&Ox4, forgetful at times. 5L HFNC, will wean as tolerated. Up x1 assist with walker. Urinal within reach. IV antibiotics. Lungs sounds coarse. Frequent cough. C/o hip pain, PRN tylenol given. VSS.

## 2022-12-31 LAB
ANION GAP SERPL CALCULATED.3IONS-SCNC: 11.6 MMOL/L (ref 5–15)
B PARAPERT DNA SPEC QL NAA+PROBE: NOT DETECTED
B PERT DNA SPEC QL NAA+PROBE: NOT DETECTED
BASOPHILS # BLD AUTO: 0.04 10*3/MM3 (ref 0–0.2)
BASOPHILS NFR BLD AUTO: 0.4 % (ref 0–1.5)
BUN SERPL-MCNC: 27 MG/DL (ref 8–23)
BUN/CREAT SERPL: 22.3 (ref 7–25)
C PNEUM DNA NPH QL NAA+NON-PROBE: NOT DETECTED
CALCIUM SPEC-SCNC: 8.5 MG/DL (ref 8.6–10.5)
CHLORIDE SERPL-SCNC: 98 MMOL/L (ref 98–107)
CO2 SERPL-SCNC: 29.4 MMOL/L (ref 22–29)
CREAT SERPL-MCNC: 1.21 MG/DL (ref 0.76–1.27)
DEPRECATED RDW RBC AUTO: 43.7 FL (ref 37–54)
EGFRCR SERPLBLD CKD-EPI 2021: 59 ML/MIN/1.73
EOSINOPHIL # BLD AUTO: 0.99 10*3/MM3 (ref 0–0.4)
EOSINOPHIL NFR BLD AUTO: 10.6 % (ref 0.3–6.2)
ERYTHROCYTE [DISTWIDTH] IN BLOOD BY AUTOMATED COUNT: 13 % (ref 12.3–15.4)
FLUAV SUBTYP SPEC NAA+PROBE: NOT DETECTED
FLUBV RNA ISLT QL NAA+PROBE: NOT DETECTED
GLUCOSE SERPL-MCNC: 109 MG/DL (ref 65–99)
HADV DNA SPEC NAA+PROBE: NOT DETECTED
HCOV 229E RNA SPEC QL NAA+PROBE: NOT DETECTED
HCOV HKU1 RNA SPEC QL NAA+PROBE: NOT DETECTED
HCOV NL63 RNA SPEC QL NAA+PROBE: NOT DETECTED
HCOV OC43 RNA SPEC QL NAA+PROBE: NOT DETECTED
HCT VFR BLD AUTO: 34.9 % (ref 37.5–51)
HGB BLD-MCNC: 12.1 G/DL (ref 13–17.7)
HMPV RNA NPH QL NAA+NON-PROBE: NOT DETECTED
HPIV1 RNA ISLT QL NAA+PROBE: NOT DETECTED
HPIV2 RNA SPEC QL NAA+PROBE: NOT DETECTED
HPIV3 RNA NPH QL NAA+PROBE: NOT DETECTED
HPIV4 P GENE NPH QL NAA+PROBE: NOT DETECTED
IMM GRANULOCYTES # BLD AUTO: 0.02 10*3/MM3 (ref 0–0.05)
IMM GRANULOCYTES NFR BLD AUTO: 0.2 % (ref 0–0.5)
LYMPHOCYTES # BLD AUTO: 1.54 10*3/MM3 (ref 0.7–3.1)
LYMPHOCYTES NFR BLD AUTO: 16.5 % (ref 19.6–45.3)
M PNEUMO IGG SER IA-ACNC: NOT DETECTED
MCH RBC QN AUTO: 31.8 PG (ref 26.6–33)
MCHC RBC AUTO-ENTMCNC: 34.7 G/DL (ref 31.5–35.7)
MCV RBC AUTO: 91.6 FL (ref 79–97)
MONOCYTES # BLD AUTO: 1.21 10*3/MM3 (ref 0.1–0.9)
MONOCYTES NFR BLD AUTO: 12.9 % (ref 5–12)
NEUTROPHILS NFR BLD AUTO: 5.56 10*3/MM3 (ref 1.7–7)
NEUTROPHILS NFR BLD AUTO: 59.4 % (ref 42.7–76)
NRBC BLD AUTO-RTO: 0 /100 WBC (ref 0–0.2)
PLATELET # BLD AUTO: 238 10*3/MM3 (ref 140–450)
PMV BLD AUTO: 9.1 FL (ref 6–12)
POTASSIUM SERPL-SCNC: 3 MMOL/L (ref 3.5–5.2)
PROCALCITONIN SERPL-MCNC: 0.16 NG/ML (ref 0–0.25)
RBC # BLD AUTO: 3.81 10*6/MM3 (ref 4.14–5.8)
RHINOVIRUS RNA SPEC NAA+PROBE: DETECTED
RSV RNA NPH QL NAA+NON-PROBE: NOT DETECTED
SARS-COV-2 RNA NPH QL NAA+NON-PROBE: NOT DETECTED
SODIUM SERPL-SCNC: 139 MMOL/L (ref 136–145)
WBC NRBC COR # BLD: 9.36 10*3/MM3 (ref 3.4–10.8)

## 2022-12-31 PROCEDURE — 25010000002 FUROSEMIDE PER 20 MG: Performed by: INTERNAL MEDICINE

## 2022-12-31 PROCEDURE — 94799 UNLISTED PULMONARY SVC/PX: CPT

## 2022-12-31 PROCEDURE — 94669 MECHANICAL CHEST WALL OSCILL: CPT

## 2022-12-31 PROCEDURE — 84145 PROCALCITONIN (PCT): CPT | Performed by: HOSPITALIST

## 2022-12-31 PROCEDURE — 97530 THERAPEUTIC ACTIVITIES: CPT

## 2022-12-31 PROCEDURE — 80048 BASIC METABOLIC PNL TOTAL CA: CPT | Performed by: HOSPITALIST

## 2022-12-31 PROCEDURE — 0202U NFCT DS 22 TRGT SARS-COV-2: CPT | Performed by: INTERNAL MEDICINE

## 2022-12-31 PROCEDURE — 94664 DEMO&/EVAL PT USE INHALER: CPT

## 2022-12-31 PROCEDURE — 94761 N-INVAS EAR/PLS OXIMETRY MLT: CPT

## 2022-12-31 PROCEDURE — 94668 MNPJ CHEST WALL SBSQ: CPT

## 2022-12-31 PROCEDURE — 85025 COMPLETE CBC W/AUTO DIFF WBC: CPT | Performed by: HOSPITALIST

## 2022-12-31 RX ORDER — MAGNESIUM SULFATE HEPTAHYDRATE 40 MG/ML
4 INJECTION, SOLUTION INTRAVENOUS AS NEEDED
Status: DISCONTINUED | OUTPATIENT
Start: 2022-12-31 | End: 2023-01-06 | Stop reason: HOSPADM

## 2022-12-31 RX ORDER — MAGNESIUM SULFATE HEPTAHYDRATE 40 MG/ML
2 INJECTION, SOLUTION INTRAVENOUS AS NEEDED
Status: DISCONTINUED | OUTPATIENT
Start: 2022-12-31 | End: 2023-01-06 | Stop reason: HOSPADM

## 2022-12-31 RX ORDER — POTASSIUM CHLORIDE 1.5 G/1.77G
40 POWDER, FOR SOLUTION ORAL AS NEEDED
Status: DISCONTINUED | OUTPATIENT
Start: 2022-12-31 | End: 2023-01-06 | Stop reason: HOSPADM

## 2022-12-31 RX ORDER — POTASSIUM CHLORIDE 750 MG/1
40 TABLET, FILM COATED, EXTENDED RELEASE ORAL AS NEEDED
Status: DISCONTINUED | OUTPATIENT
Start: 2022-12-31 | End: 2023-01-06 | Stop reason: HOSPADM

## 2022-12-31 RX ADMIN — ALBUTEROL SULFATE 2.5 MG: 2.5 SOLUTION RESPIRATORY (INHALATION) at 20:38

## 2022-12-31 RX ADMIN — POTASSIUM CHLORIDE 40 MEQ: 750 TABLET, EXTENDED RELEASE ORAL at 18:28

## 2022-12-31 RX ADMIN — GABAPENTIN 300 MG: 300 CAPSULE ORAL at 17:09

## 2022-12-31 RX ADMIN — ACETYLCYSTEINE 3 ML: 200 SOLUTION ORAL; RESPIRATORY (INHALATION) at 12:14

## 2022-12-31 RX ADMIN — Medication 10 ML: at 08:37

## 2022-12-31 RX ADMIN — GUAIFENESIN 600 MG: 600 TABLET, EXTENDED RELEASE ORAL at 08:36

## 2022-12-31 RX ADMIN — GABAPENTIN 300 MG: 300 CAPSULE ORAL at 20:24

## 2022-12-31 RX ADMIN — ALBUTEROL SULFATE 2.5 MG: 2.5 SOLUTION RESPIRATORY (INHALATION) at 15:04

## 2022-12-31 RX ADMIN — ALBUTEROL SULFATE 2.5 MG: 2.5 SOLUTION RESPIRATORY (INHALATION) at 12:09

## 2022-12-31 RX ADMIN — BUDESONIDE 0.5 MG: 0.5 INHALANT ORAL at 06:42

## 2022-12-31 RX ADMIN — SODIUM CHLORIDE SOLN NEBU 7% 4 ML: 7 NEBU SOLN at 20:46

## 2022-12-31 RX ADMIN — POTASSIUM CHLORIDE 40 MEQ: 750 TABLET, EXTENDED RELEASE ORAL at 14:36

## 2022-12-31 RX ADMIN — BUDESONIDE 0.5 MG: 0.5 INHALANT ORAL at 20:39

## 2022-12-31 RX ADMIN — ALBUTEROL SULFATE 2.5 MG: 2.5 SOLUTION RESPIRATORY (INHALATION) at 06:42

## 2022-12-31 RX ADMIN — ALBUTEROL SULFATE 2.5 MG: 2.5 SOLUTION RESPIRATORY (INHALATION) at 00:09

## 2022-12-31 RX ADMIN — GABAPENTIN 300 MG: 300 CAPSULE ORAL at 08:36

## 2022-12-31 RX ADMIN — SODIUM CHLORIDE SOLN NEBU 7% 4 ML: 7 NEBU SOLN at 00:17

## 2022-12-31 RX ADMIN — BUDESONIDE 0.5 MG: 0.5 INHALANT ORAL at 00:10

## 2022-12-31 RX ADMIN — FUROSEMIDE 80 MG: 10 INJECTION, SOLUTION INTRAMUSCULAR; INTRAVENOUS at 17:09

## 2022-12-31 RX ADMIN — ALBUTEROL SULFATE 2.5 MG: 2.5 SOLUTION RESPIRATORY (INHALATION) at 03:31

## 2022-12-31 RX ADMIN — SODIUM CHLORIDE SOLN NEBU 7% 4 ML: 7 NEBU SOLN at 06:48

## 2022-12-31 RX ADMIN — METHYLPHENIDATE HYDROCHLORIDE 10 MG: 10 TABLET ORAL at 08:36

## 2022-12-31 RX ADMIN — GUAIFENESIN 600 MG: 600 TABLET, EXTENDED RELEASE ORAL at 20:24

## 2022-12-31 RX ADMIN — FUROSEMIDE 80 MG: 10 INJECTION, SOLUTION INTRAMUSCULAR; INTRAVENOUS at 08:34

## 2022-12-31 RX ADMIN — POTASSIUM CHLORIDE 20 MEQ: 750 TABLET, EXTENDED RELEASE ORAL at 08:36

## 2022-12-31 RX ADMIN — ROPINIROLE HYDROCHLORIDE 0.5 MG: 0.5 TABLET, FILM COATED ORAL at 20:24

## 2022-12-31 RX ADMIN — PANTOPRAZOLE SODIUM 40 MG: 40 TABLET, DELAYED RELEASE ORAL at 08:36

## 2022-12-31 NOTE — SIGNIFICANT NOTE
12/31/22 0642   Oxygen Therapy   SpO2 97 %   Pulse Oximetry Type Continuous   Device (Oxygen Therapy) nasal cannula   Flow (L/min) 2  (weaned to 2L)

## 2022-12-31 NOTE — CONSULTS
CONSULT NOTE    Patient Identification:  Tony Leonard  82 y.o.  male  1940  [id number]            Requesting physician: Dr. Shukla    Reason for Consultation: acute on chronic respiratory failure     CC: worsening hypoxia     History of Present Illness:  Tony Leonard is an 82-year-old male with a complicated past medical history including severe spinal stenosis with neurogenic claudication and back pain, bronchiectasis, COPD with chronic hypoxia and oxygen supplementation.  Patient is on 4 L continuous oxygen via nasal cannula at home.    Patient has been hospitalized 3 times since late November including admissions for septic shock, COPD exacerbation, and most recently saddle pulmonary emboli for he was discharged on 12/12/2022 on apixaban.    He is followed by Dr. Salter in the office for COPD with bronchiectasis and recurrent mucous plugging.  He has been treated previously with chronic Zithromax, with chronic mucolytic therapy, and with aggressive bronchodilator regimen.  Most recent PFTs were in 2016 which revealed FEV1 of 1.39 L / 47%.    Patient returned to the emergency room on 12/29/2022.  Patient reported that he felt weak and short of breath.  When his home health care provider came for an appointment, they noticed he was not breathing well and EMS was called.  Upon EMS arrival, patient's oxygen saturation on 4 L nasal cannula/minute was 80%.  ED work-up included a procalcitonin of 0.12, WBCs of 8.63, and a chest x-ray which revealed a hazy opacity at the right apex, not seen on CTA chest from 12/10/2022.  Patient denied fevers or chills.  He reported his chronic productive cough with yellow sputum, some sinus pain, and profound fatigue.  He says symptoms have been ongoing for several months.    Review of Systems:  Constitutional: Denies fever, chills, diaphoresis.  Reports activity change and fatigue.  HEENT: Reports congestion and sinus pressure, denies trouble swallowing.  Denies  eye discharge, itching or pain.  Respiratory: Denies chest tightness.  Reports shortness of breath with exertion and at rest, wheezing, and baseline chronic productive cough with yellowish sputum.  Cardio: Denies chest pain or palpitations.  Reports his home health nurse says his legs are swollen.  GI: Denies abdominal distention or pain, constipation, diarrhea, nausea or vomiting.  : Denies dysuria, urgency, frequency.  Musculoskeletal: Reports bilateral hip pain.  Skin: Denies color change, pallor, rash or wound.  Neurological: Denies dizziness, headaches, numbness.  Reports profound weakness.    Past Medical History:  Past Medical History:   Diagnosis Date   • ADHD (attention deficit hyperactivity disorder)    • Bronchiectasis (HCC)    • CHF (congestive heart failure) (HCC)    • Colon polyp    • COPD (chronic obstructive pulmonary disease) (HCC)    • Diverticulosis    • Hard of hearing    • On home oxygen therapy     2 LITERS   • Pneumonia    • Prostate cancer (HCC) 04/22/2019   • Spinal stenosis, lumbar region with neurogenic claudication 08/02/2022       Past Surgical History:  Past Surgical History:   Procedure Laterality Date   • BRONCHOSCOPY N/A 4/30/2016    Procedure: BRONCHOSCOPY with BAL of right lower lobe and left  lower lobe.  ;  Surgeon: Nando Diaz MD;  Location: [facility];  Service:    • BRONCHOSCOPY N/A 4/28/2017    Procedure: BRONCHOSCOPY;  Surgeon: Katharina Salter MD;  Location: [facility] ENDOSCOPY;  Service:    • BRONCHOSCOPY Bilateral 6/29/2017    Procedure: BRONCHOSCOPY WITH WASHINGS;  Surgeon: Katharina Salter MD;  Location: [facility] ENDOSCOPY;  Service:    • BRONCHOSCOPY N/A 1/22/2018    Procedure: BRONCHOSCOPY AT BEDSIDE with BAL;  Surgeon: Katharina Salter MD;  Location: [facility] ENDOSCOPY;  Service:    • BRONCHOSCOPY N/A 1/30/2018    Procedure: BRONCHOSCOPY with BAL and washing;  Surgeon: Shreyas Wild MD;  Location: [facility] ENDOSCOPY;  Service:    • BRONCHOSCOPY Bilateral 4/24/2018     Procedure: BRONCHOSCOPY WITH WASHING;  Surgeon: Katharina Salter MD;  Location: [facility] ENDOSCOPY;  Service: Pulmonary   • BRONCHOSCOPY N/A 12/28/2019    Procedure: BRONCHOSCOPY with bilateral washing;  Surgeon: Katharina Salter MD;  Location: [facility] ENDOSCOPY;  Service: Pulmonary   • COLONOSCOPY  05/17/2013    eh, ih, tort, sig tics   • COLONOSCOPY N/A 5/10/2019    Non-thrombosed external hemorrhoids found on perianal exam, Diverticulosis, Tortuous colon, One 5 mm polyp in the mid ascending colon, IH. Path: Tubular adenoma.    • COLONOSCOPY N/A 9/24/2022    Procedure: COLONOSCOPY to cecum and TI with argon plasma coagulation.;  Surgeon: Bruce Black MD;  Location: [facility] ENDOSCOPY;  Service: Gastroenterology;  Laterality: N/A;  pre- GI bleeding  post- radiation proctitis, diverticulosis   • ENDOSCOPY  03/19/2015    z line irreg, hh   • ENDOSCOPY N/A 9/24/2022    Procedure: ESOPHAGOGASTRODUODENOSCOPY;  Surgeon: Bruce Black MD;  Location: [facility] ENDOSCOPY;  Service: Gastroenterology;  Laterality: N/A;  pre- GI bleeding  post- esophagitis, hiatal hernia   • HIP OPEN REDUCTION Right 1/17/2018    Procedure: HIP OPEN REDUCTION INTERNAL FIXATION WITH DYNAMIC HIP SCREW;  Surgeon: Les Black MD;  Location: [facility] OR;  Service:    • SPINE SURGERY     • TONSILLECTOMY     • TOTAL HIP ARTHROPLASTY REVISION Right 9/23/2019    Procedure: HIP  REVISION RIGHT;  Surgeon: Isauro Warner MD;  Location: [facility] MAIN OR;  Service: Orthopedics        Home Meds:  Medications Prior to Admission   Medication Sig Dispense Refill Last Dose   • acetaminophen (TYLENOL) 500 MG tablet Take 500 mg by mouth Every 6 (Six) Hours As Needed for Mild Pain.   12/28/2022   • albuterol (PROVENTIL) (2.5 MG/3ML) 0.083% nebulizer solution Take 2.5 mg by nebulization Every 6 (Six) Hours As Needed for Wheezing. 360 mL 3 12/28/2022   • apixaban (ELIQUIS) 5 MG tablet tablet Take 1 tablet by mouth Every 12 (Twelve) Hours. Indications: DVT/PE  (active thrombosis) 60 tablet 1 12/28/2022   • budesonide (PULMICORT) 180 MCG/ACT inhaler Inhale 2 puffs 2 (Two) Times a Day. 1 each 3 12/28/2022   • calcium carbonate (TUMS) 500 MG chewable tablet Chew 1 tablet As Needed for Indigestion or Heartburn.   12/28/2022   • gabapentin (NEURONTIN) 300 MG capsule Take 300 mg by mouth 3 (Three) Times a Day.   12/28/2022   • guaifenesin (ROBITUSSIN) 100 MG/5ML liquid Take 10 mL by mouth Every 4 (Four) Hours As Needed for Cough. 118 mL 1    • methylphenidate (RITALIN) 10 MG tablet Take 10 mg by mouth Daily.   12/28/2022   • Multiple Vitamins-Minerals (MULTIVITAMIN ADULT PO) Take 1 tablet by mouth Daily.   12/28/2022   • pantoprazole (PROTONIX) 40 MG EC tablet Take 1 tablet by mouth Daily. 30 tablet 0 12/28/2022   • potassium chloride (K-DUR,KLOR-CON) 20 MEQ CR tablet Take 1 tablet by mouth Daily.   12/28/2022   • rOPINIRole (REQUIP) 0.5 MG tablet Take 1 tablet by mouth Every Night. Take 1 hour before bedtime. (Patient taking differently: Take 0.5 mg by mouth Every Night.) 90 tablet 1 12/28/2022   • sodium chloride 7 % nebulizer solution nebulizer solution Inhale 1 vial 2 (Two) Times a Day.   12/28/2022   • terbinafine (lamiSIL) 250 MG tablet Take 250 mg by mouth Daily.   12/28/2022       Allergies:  Allergies   Allergen Reactions   • Daliresp [Roflumilast] Anaphylaxis   • Latex Rash   • Sulfa Antibiotics Rash       Social History:   Social History     Socioeconomic History   • Marital status: Single   Tobacco Use   • Smoking status: Never   • Smokeless tobacco: Never   Vaping Use   • Vaping Use: Never used   Substance and Sexual Activity   • Alcohol use: No     Comment: red wine occassional   • Drug use: No   • Sexual activity: Defer       Family History:  Family History   Problem Relation Age of Onset   • Thyroid disease Mother    • No Known Problems Father    • Malig Hyperthermia Neg Hx        Physical Exam:  /73   Pulse 107   Temp 97.6 °F (36.4 °C) (Oral)   Resp 20  "  Ht 180.3 cm (71\")   Wt 79.8 kg (176 lb)   SpO2 96%   BMI 24.55 kg/m²  Body mass index is 24.55 kg/m². 96% 79.8 kg (176 lb)    Physical Exam:  Constitutional: Alert, elderly male laying in bed.  He is coughing frequently with sputum production.  Does not appear to be in acute distress.  SPO2 98% on 6 L high flow nasal cannula.  HEENT: Head normocephalic, atraumatic.  Mouth/throat moist, clear of exudate or erythema.  Eyes are equal round and reactive, conjunctiva normal and nonicteric.  Neck is supple, no palpable thyroidomegaly or cervical lymphadenopathy, no JVD appreciated.  Cardiovascular: Normal rate, regular rhythm, normal S1-S2.  No murmur or gallop.  1+ pitting edema in bilateral lower extremities.  Pulmonary: Breath sounds are coarse throughout, with expiratory wheezes over right upper lobe anteriorly.  Abdominal: Flat, soft, nontender, no palpable hepatosplenomegaly.  : Not examined  Musculoskeletal: Tender over bilateral hip joints, no swelling.  1+ bilateral lower extremity edema  Skin: Warm, dry, no jaundice or petechiae.  Dry scaly skin over bilateral lower extremities.  Capillary refill less than 3 seconds.  Neurological: No focal deficit, oriented x3.  No weakness, no sensory or motor deficits appreciated.    LABS:  COVID19   Date Value Ref Range Status   12/09/2022 Not Detected Not Detected - Ref. Range Final       Lab Results   Component Value Date    CALCIUM 8.5 (L) 12/30/2022    PHOS 2.4 (L) 11/27/2022     Results from last 7 days   Lab Units 12/30/22  0643 12/29/22  1204   SODIUM mmol/L 139 136   POTASSIUM mmol/L 3.5 4.2   CHLORIDE mmol/L 101 102   CO2 mmol/L 26.9 25.1   BUN mg/dL 16 11   CREATININE mg/dL 1.18 0.96   GLUCOSE mg/dL 90 85   CALCIUM mg/dL 8.5* 9.4   WBC 10*3/mm3 9.09 8.63   HEMOGLOBIN g/dL 11.8* 13.2   PLATELETS 10*3/mm3 225 233   ALT (SGPT) U/L 10 19   AST (SGOT) U/L 16 27   PROBNP pg/mL  --  557.0   PROCALCITONIN ng/mL  --  0.12     Lab Results   Component Value Date    " CKTOTAL 84 11/23/2022    TROPONINT 0.012 12/29/2022     Results from last 7 days   Lab Units 12/29/22  1204   TROPONIN T ng/mL 0.012     Results from last 7 days   Lab Units 12/29/22  1204   BLOODCX  No growth at 24 hours  No growth at 24 hours     Results from last 7 days   Lab Units 12/29/22  1204   PROCALCITONIN ng/mL 0.12   LACTATE mmol/L 1.4                 Results from last 7 days   Lab Units 12/29/22  1204   BLOODCX  No growth at 24 hours  No growth at 24 hours     Lab Results   Component Value Date    TSH 1.420 05/11/2021     Estimated Creatinine Clearance: 52.6 mL/min (by C-G formula based on SCr of 1.18 mg/dL).         Imaging: I personally visualized the images of scans/x-rays performed within last 3 days.      Assessment:  1. Acute on chronic hypoxic respiratory failure  2. Infiltrate of right upper lobe  3. 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 History of recurrent mucous plugging  5. Home oxygen therapy, 4 L/min  6. Recent saddle pulmonary emboli on chronic anticoagulation (Eliquis)  7. GERD  8. Spinal stenosis    Recommendations:  · Chest x-ray reviewed, hazy opacity at the right apex.  · Patient's WBC and procalcitonin have both been normal- okay to discontinue Zosyn. No antibiotic therapy indicated at this time.   · Continue patient's bronchodilator therapy and mucolytic therapy with albuterol, budesonide, and guaifenesin.   · Recommend repeat chest x-ray on Monday.    Patient was placed in face mask upon entering room and kept mask on throughout our encounter. I wore full protective equipment throughout this patient encounter including a face mask, gown and gloves. Hand hygiene was performed before donning protective equipment and after removal when leaving the room.    Wandy Grimaldo, APRN  12/30/2022  20:03 EST      Much of this encounter note is an electronic transcription/translation of spoken language to printed text using Dragon Software.

## 2022-12-31 NOTE — PROGRESS NOTES
"  Daily Progress Note.   [address]  12/31/2022    Patient:  Name:  Tony Leonard  MRN:  [id number]  1940  82 y.o.  male         CC: Acute on chronic hypoxemic respiratory failure COPD bronchiectasis    Interval History:  States his breathing is not terrible just a little bit lower than his baseline still with significant sputum production and cough.  He is awake and alert.  He says he always has lower extremity swelling and indeed he does.  No chest pain no palpitations        Physical Exam:  /58 (BP Location: Left arm, Patient Position: Lying)   Pulse 95   Temp 97.8 °F (36.6 °C) (Oral)   Resp 20   Ht 180.3 cm (71\")   Wt 79.8 kg (176 lb)   SpO2 97%   BMI 24.55 kg/m²   Body mass index is 24.55 kg/m².    Intake/Output Summary (Last 24 hours) at 12/31/2022 1108  Last data filed at 12/31/2022 0930  Gross per 24 hour   Intake 400 ml   Output 2025 ml   Net -1625 ml     General appearance: NAD, conversant   Eyes: anicteric sclerae, moist conjunctivae; no lid-lag;    HENT: Atraumatic; oropharynx clear with moist mucous membranes and no mucosal ulcerations; normal hard and soft palate  Neck: Trachea midline;  supple   Lungs: Wheeze or rhonchi coarse air entry  CV: RRR, no MRGs   Abdomen: Soft, non-tender; no masses or HSM  Extremities: Positive peripheral edema pitting  Skin: WWP no diffuse visible rash  Psych: Appropriate affect, alert and oriented   Neuro moves all ext, cns 2-12 intact speech intact    Data Review:  Notable Labs:  Results from last 7 days   Lab Units 12/31/22  0823 12/30/22  0643 12/29/22  1204   WBC 10*3/mm3 9.36 9.09 8.63   HEMOGLOBIN g/dL 12.1* 11.8* 13.2   PLATELETS 10*3/mm3 238 225 233     Results from last 7 days   Lab Units 12/31/22  0822 12/30/22  0643 12/29/22  1204   SODIUM mmol/L 139 139 136   POTASSIUM mmol/L 3.0* 3.5 4.2   CHLORIDE mmol/L 98 101 102   CO2 mmol/L 29.4* 26.9 25.1   BUN mg/dL 27* 16 11   CREATININE mg/dL 1.21 1.18 0.96   GLUCOSE mg/dL " 109* 90 85   CALCIUM mg/dL 8.5* 8.5* 9.4   Estimated Creatinine Clearance: 51.3 mL/min (by C-G formula based on SCr of 1.21 mg/dL).    Results from last 7 days   Lab Units 12/31/22  0823 12/31/22  0822 12/30/22  0643 12/29/22  1204   AST (SGOT) U/L  --   --  16 27   ALT (SGPT) U/L  --   --  10 19   PROCALCITONIN ng/mL  --  0.16  --  0.12   LACTATE mmol/L  --   --   --  1.4   PLATELETS 10*3/mm3 238  --  225 233             Imaging:  Reviewed chest images personally from past 3 days    ASSESSMENT  /  PLAN:  1. Acute on chronic hypoxic respiratory failure  2. Infiltrate of right upper lobe  3. 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 History of recurrent mucous plugging  5. Home oxygen therapy, 4 L/min  6. Recent saddle pulmonary emboli on chronic anticoagulation (Eliquis)  7. GERD  8. Spinal stenosis    Cont azithromycin  Cont aggressive chest physiotherapy - flutter chest pt, mucumyst, duonebs budesonide  RVP  If no improvement above may need Lasix  Sputum culture  All issues new to me today.  Prior hospital course, labs and imaging reviewed.   Scott Bob MD  [facility]  12/31/22  11:08 EST

## 2022-12-31 NOTE — PROGRESS NOTES
"DAILY PROGRESS NOTE  [facility]    Patient Identification:  Name: Tony Leonard  Age: 82 y.o.  Sex: male  :  1940  MRN: [id number]         Primary Care Physician: Erich Aviles MD      Subjective  Patient presently has no acute pulmonary complaints.  He states he is about at his baseline from that point of view.  His complaint today is concerning his hip pain issues.    Objective:  General Appearance:  Comfortable and in no acute distress (Chronically ill-appearing.).    Vital signs: (most recent): Blood pressure 134/73, pulse 112, temperature 97.6 °F (36.4 °C), temperature source Oral, resp. rate 18, height 180.3 cm (71\"), weight 79.8 kg (176 lb), SpO2 97 %.    Lungs:  Normal effort and normal respiratory rate.  He is not in respiratory distress.  No stridor.  There are wheezes and rhonchi.  No rales or decreased breath sounds.  (Rhonchi throughout.  Occasional wheeze.)  Heart: Normal rate.  Regular rhythm.  S1 normal.    Extremities: There is no dependent edema.    Neurological: Patient is alert and oriented to person, place and time.    Skin:  Warm and dry.                Vital signs in last 24 hours:  Temp:  [97.5 °F (36.4 °C)-98 °F (36.7 °C)] 97.6 °F (36.4 °C)  Heart Rate:  [] 112  Resp:  [18-20] 18  BP: (107-134)/(55-73) 134/73    Intake/Output:    Intake/Output Summary (Last 24 hours) at 2022 192  Last data filed at 2022 1048  Gross per 24 hour   Intake 250 ml   Output 975 ml   Net -725 ml         Results from last 7 days   Lab Units 22  0643 22  1204   WBC 10*3/mm3 9.09 8.63   HEMOGLOBIN g/dL 11.8* 13.2   PLATELETS 10*3/mm3 225 233     Results from last 7 days   Lab Units 22  0643 22  1204   SODIUM mmol/L 139 136   POTASSIUM mmol/L 3.5 4.2   CHLORIDE mmol/L 101 102   CO2 mmol/L 26.9 25.1   BUN mg/dL 16 11   CREATININE mg/dL 1.18 0.96   GLUCOSE mg/dL 90 85   Estimated Creatinine Clearance: 52.6 mL/min (by C-G formula based on SCr of " 1.18 mg/dL).  Results from last 7 days   Lab Units 12/30/22  0643 12/29/22  1204   CALCIUM mg/dL 8.5* 9.4   ALBUMIN g/dL 3.4* 4.1     Results from last 7 days   Lab Units 12/30/22  0643 12/29/22  1204   ALBUMIN g/dL 3.4* 4.1   BILIRUBIN mg/dL 0.8 0.9   ALK PHOS U/L 71 86   AST (SGOT) U/L 16 27   ALT (SGPT) U/L 10 19       Assessment:  Acute on chronic hypoxic respiratory failure: Suspect secondary to COPD exacerbation.  Symptomatically patient about at his baseline and on his usual O2 requirements.  We will consult his pulmonologist.  Pulmonary infiltrate: Subtle.  Normal procalcitonin.  No fever.  No leukocytosis.    COPD: Severe.  Positive blood cultures: 1 is with a coagulase-negative staph which is a contaminant.  Together with bacillus species.  Also suspect contamination but will continue antibiotics for the time being and request infectious disease opinion.  Chronic CHF with preserved LVH: Appears euvolemic.  Recent history of saddle pulmonary embolus: Continue Eliquis.  Chronic pain: Continue home medications.  ADD: Continue home medications.    All problems new to this examiner.    Plan:  Please see above.    Stone Shukla MD  12/30/2022  19:24 EST

## 2022-12-31 NOTE — PROGRESS NOTES
"DAILY PROGRESS NOTE  [facility]    Patient Identification:  Name: Tony Leonard  Age: 82 y.o.  Sex: male  :  1940  MRN: [id number]         Primary Care Physician: Erich Aviles MD      Subjective  Patient reporting cough with sputum production. He thinks he is a little worse than his baseline.     Objective:  General Appearance:  Comfortable and in no acute distress (Chronically ill-appearing.).    Vital signs: (most recent): Blood pressure 118/71, pulse 104, temperature 98.4 °F (36.9 °C), temperature source Oral, resp. rate 20, height 180.3 cm (71\"), weight 79.8 kg (176 lb), SpO2 92 %.    Lungs:  Normal effort and normal respiratory rate.  He is not in respiratory distress.  No stridor.  There are wheezes and rhonchi.  No rales or decreased breath sounds.  (Rhonchi throughout.  Occasional wheeze.)  Heart: Normal rate.  Regular rhythm.  S1 normal.    Extremities: There is no dependent edema.    Neurological: Patient is alert and oriented to person, place and time.    Skin:  Warm and dry.            Vital signs in last 24 hours:  Temp:  [97.5 °F (36.4 °C)-98.7 °F (37.1 °C)] 98.4 °F (36.9 °C)  Heart Rate:  [] 104  Resp:  [18-20] 20  BP: ()/(55-73) 118/71    Intake/Output:    Intake/Output Summary (Last 24 hours) at 2022 1537  Last data filed at 2022 1314  Gross per 24 hour   Intake 400 ml   Output 2425 ml   Net -2025 ml         Results from last 7 days   Lab Units 22  0823 22  0643 22  1204   WBC 10*3/mm3 9.36 9.09 8.63   HEMOGLOBIN g/dL 12.1* 11.8* 13.2   PLATELETS 10*3/mm3 238 225 233     Results from last 7 days   Lab Units 22  0822 22  0643 22  1204   SODIUM mmol/L 139 139 136   POTASSIUM mmol/L 3.0* 3.5 4.2   CHLORIDE mmol/L 98 101 102   CO2 mmol/L 29.4* 26.9 25.1   BUN mg/dL 27* 16 11   CREATININE mg/dL 1.21 1.18 0.96   GLUCOSE mg/dL 109* 90 85   Estimated Creatinine Clearance: 51.3 mL/min (by C-G formula based on SCr of " 1.21 mg/dL).  Results from last 7 days   Lab Units 12/31/22  0822 12/30/22  0643 12/29/22  1204   CALCIUM mg/dL 8.5* 8.5* 9.4   ALBUMIN g/dL  --  3.4* 4.1     Results from last 7 days   Lab Units 12/30/22  0643 12/29/22  1204   ALBUMIN g/dL 3.4* 4.1   BILIRUBIN mg/dL 0.8 0.9   ALK PHOS U/L 71 86   AST (SGOT) U/L 16 27   ALT (SGPT) U/L 10 19       Assessment:  Acute on chronic hypoxic respiratory failure: Suspect secondary to COPD exacerbation.  Symptomatically patient slightly worse than his baseline and on his usual O2 requirements.  Appreciate pulm seeing him.   Pulmonary infiltrate: Subtle.  Normal procalcitonin.  No fever.  No leukocytosis.  Monitor off abx.   COPD: Severe- pulm following.   Chronic CHF with preserved LVH: Appears euvolemic. Consider lasix if worsening peripheral edema.   Hypokalemia: replace per protocol  Recent history of saddle pulmonary embolus: Continue Eliquis.  Chronic pain: Continue home medications.  ADD: Continue home medications.    All problems new to this examiner.    Blood cx that were noted + on yest note are from last admission; d/w ID and canceled consult.     Plan:  Please see above.    Elisa Tony MD  12/31/2022  15:37 EST

## 2022-12-31 NOTE — THERAPY TREATMENT NOTE
Patient Name: Tony Leonard  : 1940    MRN: [id number]                              Today's Date: 2022       Admit Date: 2022    Visit Dx:     ICD-10-CM ICD-9-CM   1. Pneumonia of right upper lobe due to infectious organism  J18.9 486   2. Other acute pulmonary embolism without acute cor pulmonale ([facility])  I26.99 415.19   3. Acute on chronic congestive heart failure, unspecified heart failure type ([facility])  I50.9 428.0     Patient Active Problem List   Diagnosis   • Thrush, oral   • ADD (attention deficit disorder)   • Hemorrhoids   • Bronchiectasis with acute lower respiratory infection ([facility])   • Diverticul disease small and large intestine, no perforati or abscess   • Benign non-nodular prostatic hyperplasia without lower urinary tract symptoms   • Gastroesophageal reflux disease   • Malaise and fatigue   • Environmental allergies   • Hemoptysis   • Dyslipidemia   • Primary osteoarthritis involving multiple joints   • Pneumonia of right lower lobe due to infectious organism   • Abnormal EKG   • COPD (chronic obstructive pulmonary disease) ([facility])   • Mild malnutrition ([facility])   • Acute on chronic respiratory failure with hypoxia ([facility])   • Fracture, intertrochanteric, right femur, closed, initial encounter ([facility])   • Chronic diastolic CHF (congestive heart failure) ([facility])   • Hematoma of frontal scalp   • Postoperative anemia due to acute blood loss   • Urinary retention   • Hemorrhagic disorder due to circulating anticoagulants ([facility])   • History of fracture of right hip   • Closed fracture of right hip with routine healing   • Chronic low back pain with sciatica   • Change in bowel function   • Rectal bleeding   • Prostate cancer ([facility])   • On home oxygen therapy   • Acute viral bronchitis   • Chronic diastolic (congestive) heart failure ([facility])   • Peripheral neuropathy   • Plantar fasciitis   • COPD exacerbation ([facility])   • Bilateral lower extremity edema   • Microscopic hematuria   • Acute midline low  back pain with right-sided sciatica   • Spinal stenosis, lumbar region with neurogenic claudication   • Compression deformity of vertebra   • Rectal bleed   • Esophagitis   • Angiectasia   • Hiatal hernia   • Overweight (BMI 25.0-29.9)   • Leukocytosis   • Bilateral hip pain   • Elevated troponin level not due myocardial infarction   • Nocturnal hypoxia   • Acute saddle pulmonary embolism without acute cor pulmonale ([facility]) - 12/11/2022   • Pneumonia of right upper lobe due to infectious organism     Past Medical History:   Diagnosis Date   • ADHD (attention deficit hyperactivity disorder)    • Bronchiectasis ([facility])    • CHF (congestive heart failure) ([facility])    • Colon polyp    • COPD (chronic obstructive pulmonary disease) ([facility])    • Diverticulosis    • Hard of hearing    • On home oxygen therapy     2 LITERS   • Pneumonia    • Prostate cancer ([facility]) 04/22/2019   • Spinal stenosis, lumbar region with neurogenic claudication 08/02/2022     Past Surgical History:   Procedure Laterality Date   • BRONCHOSCOPY N/A 4/30/2016    Procedure: BRONCHOSCOPY with BAL of right lower lobe and left  lower lobe.  ;  Surgeon: Nando Daiz MD;  Location: [facility] ENDOSCOPY;  Service:    • BRONCHOSCOPY N/A 4/28/2017    Procedure: BRONCHOSCOPY;  Surgeon: Katharina Salter MD;  Location: [facility] ENDOSCOPY;  Service:    • BRONCHOSCOPY Bilateral 6/29/2017    Procedure: BRONCHOSCOPY WITH WASHINGS;  Surgeon: Katharina Salter MD;  Location: [facility] ENDOSCOPY;  Service:    • BRONCHOSCOPY N/A 1/22/2018    Procedure: BRONCHOSCOPY AT BEDSIDE with BAL;  Surgeon: Katharina Salter MD;  Location: [facility] ENDOSCOPY;  Service:    • BRONCHOSCOPY N/A 1/30/2018    Procedure: BRONCHOSCOPY with BAL and washing;  Surgeon: Shreyas Wild MD;  Location: [facility] ENDOSCOPY;  Service:    • BRONCHOSCOPY Bilateral 4/24/2018    Procedure: BRONCHOSCOPY WITH WASHING;  Surgeon: Katharina Salter MD;  Location: [facility] ENDOSCOPY;  Service: Pulmonary   • BRONCHOSCOPY N/A 12/28/2019     Procedure: BRONCHOSCOPY with bilateral washing;  Surgeon: Katharina Salter MD;  Location: [facility] ENDOSCOPY;  Service: Pulmonary   • COLONOSCOPY  05/17/2013    eh, ih, tort, sig tics   • COLONOSCOPY N/A 5/10/2019    Non-thrombosed external hemorrhoids found on perianal exam, Diverticulosis, Tortuous colon, One 5 mm polyp in the mid ascending colon, IH. Path: Tubular adenoma.    • COLONOSCOPY N/A 9/24/2022    Procedure: COLONOSCOPY to cecum and TI with argon plasma coagulation.;  Surgeon: Bruce Black MD;  Location[facility] END[facility]COPY;  Service: Gastroenterology;  Laterality: N/A;  pre- GI bleeding  post- radiation proctitis, diverticulosis   • ENDOSCOPY  03/19/2015    z line irreg, hh   • ENDOSCOPY N/A 9/24/2022    Procedure: ESOPHAGOGASTRODUODENOSCOPY;  Surgeon: Bruce Black MD;  Location: [facility] ENDOSCOPY;  Service: Gastroenterology;  Laterality: N/A;  pre- GI bleeding  post- esophagitis, hiatal hernia   • HIP OPEN REDUCTION Right 1/17/2018    Procedure: HIP OPEN REDUCTION INTERNAL FIXATION WITH DYNAMIC HIP SCREW;  Surgeon: Les Black MD;  Location: [facility] MAIN OR;  Service:    • SPINE SURGERY     • TONSILLECTOMY     • TOTAL HIP ARTHROPLASTY REVISION Right 9/23/2019    Procedure: HIP  REVISION RIGHT;  Surgeon: Isauro Warner MD;  Location: [facility] MAIN OR;  Service: Orthopedics      General Information     Row Name 12/31/22 1501          Physical Therapy Time and Intention    Document Type therapy note (daily note)  -PH     Mode of Treatment physical therapy  -PH     Row Name 12/31/22 1501          General Information    Existing Precautions/Restrictions fall;oxygen therapy device and L/min  -PH     Barriers to Rehab medically complex;previous functional deficit  -PH     Row Name 12/31/22 1501          Safety Issues, Functional Mobility    Safety Issues Affecting Function (Mobility) impulsivity;insight into deficits/self-awareness;judgment;positioning of assistive device;problem-solving;safety  precautions follow-through/compliance;safety precaution awareness;sequencing abilities  -PH     Impairments Affecting Function (Mobility) balance;endurance/activity tolerance;strength;shortness of breath;postural/trunk control;range of motion (ROM)  -PH     Comment, Safety Issues/Impairments (Mobility) gt belt and non skid socks donned  -PH           User Key  (r) = Recorded By, (t) = Taken By, (c) = Cosigned By    Initials Name Provider Type    PH Terri Hubbard PTA Physical Therapist Assistant               Mobility     Row Name 12/31/22 1501          Bed Mobility    Bed Mobility supine-sit  -PH     Supine-Sit [place] (Bed Mobility) minimum assist (75% patient effort);verbal cues;nonverbal cues (demo/gesture);contact guard  -PH     Assistive Device (Bed Mobility) bed rails;head of bed elevated  -PH     Comment, (Bed Mobility) pt reporting freq abdominal cramping and leaning posteriorly then sitting up when EOB  -PH     Row Name 12/31/22 1501          Sit-Stand Transfer    Sit-Stand [place] (Transfers) moderate assist (50% patient effort);maximum assist (25% patient effort);verbal cues;nonverbal cues (demo/gesture)  -PH     Assistive Device (Sit-Stand Transfers) walker, front-wheeled  -PH     Comment, (Sit-Stand Transfer) rocking motion to stand w/ pt unsteady; pt sat down again to use urinal.  -PH     Row Name 12/31/22 1501          Gait/Stairs (Locomotion)    [place] Level (Gait) minimum assist (75% patient effort);verbal cues  -PH     Assistive Device (Gait) walker, front-wheeled  -PH     Distance in Feet (Gait) 12' to BR then approx 20' to chair  -PH     Deviations/Abnormal Patterns (Gait) antalgic;norm decreased;gait speed decreased;stride length decreased  -PH     Bilateral Gait Deviations forward flexed posture;heel strike decreased  -PH     Comment, (Gait/Stairs) pt very unsteady w/ no LOB; pt cued to remain closer to fww and for B hand placement when sitting  -PH           User  "Key  (r) = Recorded By, (t) = Taken By, (c) = Cosigned By    Initials Name Provider Type    PH Terri Hubbard PTA Physical Therapist Assistant               Obj/Interventions     Row Name 12/31/22 1503          Balance    Balance Assessment sitting static balance;standing static balance  -PH     Static Sitting Balance standby assist  -PH     Static Standing Balance contact guard  -PH     Position/Device Used, Standing Balance walker, front-wheeled  -PH     Balance Interventions sit to stand;moderate challenge;highly challenging  -PH     Comment, Balance poor sit to stand bal today  -PH           User Key  (r) = Recorded By, (t) = Taken By, (c) = Cosigned By    Initials Name Provider Type    PH Terri Hubbard PTA Physical Therapist Assistant               Goals/Plan    No documentation.                Clinical Impression     Row Name 12/31/22 1506          Pain    Pre/Posttreatment Pain Comment pt states he has \"very little pain\" although abd cramping throughout session.  -PH     Pain Intervention(s) Repositioned;Ambulation/increased activity;Rest  -PH     Row Name 12/31/22 7995          Plan of Care Review    Plan of Care Reviewed With patient  -PH     Progress improving  -PH     Outcome Evaluation Pt seen by PT this PM for treatment. Pt sat up to EOB req CGA / SV. Pt then stood w/ rocking motion req mod / max A and use of fww. Pt reseated to use urinal then stood again. Pt amb fwd then to BR req min A and use of fww. Pt unsteady w/ fww too far from pt w/ pt cued to correct. Pt stood from toilet w/ mod A and req CGA for brief redonning w/ use of fww. Pt amb to chair w/ alarm set. PT will prog as pt kathryn.  -PH     Row Name 12/31/22 1507          Positioning and Restraints    Pre-Treatment Position in bed  -PH     Post Treatment Position chair  -PH     In Chair reclined;call light within reach;encouraged to call for assist;exit alarm on;notified nsg  -PH           User Key  (r) = Recorded By, (t) = " Taken By, (c) = Cosigned By    Initials Name Provider Type     Terri Hubbard PTA Physical Therapist Assistant               Outcome Measures     Row Name 12/31/22 1508          How much help from another person do you currently need...    Turning from your back to your side while in flat bed without using bedrails? 3  -PH     Moving from lying on back to sitting on the side of a flat bed without bedrails? 3  -PH     Moving to and from a bed to a chair (including a wheelchair)? 3  -PH     Standing up from a chair using your arms (e.g., wheelchair, bedside chair)? 2  -PH     Climbing 3-5 steps with a railing? 1  -PH     To walk in hospital room? 2  -PH     AM-PAC 6 Clicks Score (PT) 14  -PH     Highest level of mobility 4 --> Transferred to chair/commode  -PH     Row Name 12/31/22 1508          Functional Assessment    Outcome Measure Options AM-PAC 6 Clicks Basic Mobility (PT)  -PH           User Key  (r) = Recorded By, (t) = Taken By, (c) = Cosigned By    Initials Name Provider Type     Terri Hubbard PTA Physical Therapist Assistant                             Physical Therapy Education     Title: PT OT SLP Therapies (In Progress)     Topic: Physical Therapy (In Progress)     Point: Mobility training (In Progress)     Learning Progress Summary           Patient Acceptance, E,D, NR by  at 12/31/2022 1509    Acceptance, E, VU,NR by  at 12/30/2022 1634                   Point: Home exercise program (Done)     Learning Progress Summary           Patient Acceptance, E, VU,NR by  at 12/30/2022 1634                   Point: Body mechanics (In Progress)     Learning Progress Summary           Patient Acceptance, E,D, NR by  at 12/31/2022 1509    Acceptance, E, VU,NR by  at 12/30/2022 1634                   Point: Precautions (In Progress)     Learning Progress Summary           Patient Acceptance, E,D, NR by  at 12/31/2022 1509    Acceptance, E, VU,NR by  at 12/30/2022 1634                                User Key     Initials Effective Dates Name Provider Type Discipline     06/16/21 -  Ann Serna, PT Physical Therapist PT    PH 06/16/21 -  Terri Hubbard PTA Physical Therapist Assistant PT              PT Recommendation and Plan     Plan of Care Reviewed With: patient  Progress: improving  Outcome Evaluation: Pt seen by PT this PM for treatment. Pt sat up to EOB req CGA / SV. Pt then stood w/ rocking motion req mod / max A and use of fww. Pt reseated to use urinal then stood again. Pt amb fwd then to BR req min A and use of fww. Pt unsteady w/ fww too far from pt w/ pt cued to correct. Pt stood from toilet w/ mod A and req CGA for brief redonning w/ use of fww. Pt amb to chair w/ alarm set. PT will prog as pt kathryn.     Time Calculation:    PT Charges     Row Name 12/31/22 1509             Time Calculation    Start Time 1410  -PH      Stop Time 1433  -PH      Time Calculation (min) 23 min  -PH      PT Received On 12/31/22  -PH      PT - Next Appointment 01/02/23  -PH         Timed Charges    63282 - PT Therapeutic Activity Minutes 23  -PH         Total Minutes    Timed Charges Total Minutes 23  -PH       Total Minutes 23  -PH            User Key  (r) = Recorded By, (t) = Taken By, (c) = Cosigned By    Initials Name Provider Type    PH Terri Hubbard PTA Physical Therapist Assistant              Therapy Charges for Today     Code Description Service Date Service Provider Modifiers Qty    [id number]  PT THERAPEUTIC ACT EA 15 MIN 12/31/2022 Terri Hubbard PTA GP 2          PT G-Codes  Outcome Measure Options: AM-PAC 6 Clicks Basic Mobility (PT)  AM-PAC 6 Clicks Score (PT): 14  AM-PAC 6 Clicks Score (OT): 17  PT Discharge Summary  Anticipated Discharge Disposition (PT): skilled nursing facility    Terri Hubbard PTA  12/31/2022

## 2023-01-01 LAB
ANION GAP SERPL CALCULATED.3IONS-SCNC: 10 MMOL/L (ref 5–15)
BASOPHILS # BLD AUTO: 0.04 10*3/MM3 (ref 0–0.2)
BASOPHILS NFR BLD AUTO: 0.4 % (ref 0–1.5)
BUN SERPL-MCNC: 33 MG/DL (ref 8–23)
BUN/CREAT SERPL: 34.4 (ref 7–25)
CALCIUM SPEC-SCNC: 8.8 MG/DL (ref 8.6–10.5)
CHLORIDE SERPL-SCNC: 96 MMOL/L (ref 98–107)
CO2 SERPL-SCNC: 29 MMOL/L (ref 22–29)
CREAT SERPL-MCNC: 0.96 MG/DL (ref 0.76–1.27)
DEPRECATED RDW RBC AUTO: 45.7 FL (ref 37–54)
EGFRCR SERPLBLD CKD-EPI 2021: 77.9 ML/MIN/1.73
EOSINOPHIL # BLD AUTO: 1.03 10*3/MM3 (ref 0–0.4)
EOSINOPHIL NFR BLD AUTO: 11.1 % (ref 0.3–6.2)
ERYTHROCYTE [DISTWIDTH] IN BLOOD BY AUTOMATED COUNT: 13.2 % (ref 12.3–15.4)
GLUCOSE SERPL-MCNC: 118 MG/DL (ref 65–99)
HCT VFR BLD AUTO: 35.7 % (ref 37.5–51)
HGB BLD-MCNC: 12.2 G/DL (ref 13–17.7)
IMM GRANULOCYTES # BLD AUTO: 0.03 10*3/MM3 (ref 0–0.05)
IMM GRANULOCYTES NFR BLD AUTO: 0.3 % (ref 0–0.5)
LYMPHOCYTES # BLD AUTO: 1.58 10*3/MM3 (ref 0.7–3.1)
LYMPHOCYTES NFR BLD AUTO: 17 % (ref 19.6–45.3)
MCH RBC QN AUTO: 32.4 PG (ref 26.6–33)
MCHC RBC AUTO-ENTMCNC: 34.2 G/DL (ref 31.5–35.7)
MCV RBC AUTO: 94.7 FL (ref 79–97)
MONOCYTES # BLD AUTO: 1.14 10*3/MM3 (ref 0.1–0.9)
MONOCYTES NFR BLD AUTO: 12.2 % (ref 5–12)
NEUTROPHILS NFR BLD AUTO: 5.49 10*3/MM3 (ref 1.7–7)
NEUTROPHILS NFR BLD AUTO: 59 % (ref 42.7–76)
NRBC BLD AUTO-RTO: 0 /100 WBC (ref 0–0.2)
PLATELET # BLD AUTO: 250 10*3/MM3 (ref 140–450)
PMV BLD AUTO: 9.2 FL (ref 6–12)
POTASSIUM SERPL-SCNC: 3.4 MMOL/L (ref 3.5–5.2)
RBC # BLD AUTO: 3.77 10*6/MM3 (ref 4.14–5.8)
SODIUM SERPL-SCNC: 135 MMOL/L (ref 136–145)
WBC NRBC COR # BLD: 9.31 10*3/MM3 (ref 3.4–10.8)

## 2023-01-01 PROCEDURE — 80048 BASIC METABOLIC PNL TOTAL CA: CPT | Performed by: STUDENT IN AN ORGANIZED HEALTH CARE EDUCATION/TRAINING PROGRAM

## 2023-01-01 PROCEDURE — 84132 ASSAY OF SERUM POTASSIUM: CPT | Performed by: STUDENT IN AN ORGANIZED HEALTH CARE EDUCATION/TRAINING PROGRAM

## 2023-01-01 PROCEDURE — 25010000002 FUROSEMIDE PER 20 MG: Performed by: INTERNAL MEDICINE

## 2023-01-01 PROCEDURE — 94799 UNLISTED PULMONARY SVC/PX: CPT

## 2023-01-01 PROCEDURE — 85025 COMPLETE CBC W/AUTO DIFF WBC: CPT | Performed by: STUDENT IN AN ORGANIZED HEALTH CARE EDUCATION/TRAINING PROGRAM

## 2023-01-01 PROCEDURE — 94669 MECHANICAL CHEST WALL OSCILL: CPT

## 2023-01-01 PROCEDURE — 94668 MNPJ CHEST WALL SBSQ: CPT

## 2023-01-01 PROCEDURE — 94664 DEMO&/EVAL PT USE INHALER: CPT

## 2023-01-01 PROCEDURE — 94761 N-INVAS EAR/PLS OXIMETRY MLT: CPT

## 2023-01-01 RX ORDER — AZITHROMYCIN 250 MG/1
250 TABLET, FILM COATED ORAL 3 TIMES WEEKLY
Status: DISCONTINUED | OUTPATIENT
Start: 2023-01-02 | End: 2023-01-06 | Stop reason: HOSPADM

## 2023-01-01 RX ADMIN — ACETAMINOPHEN 650 MG: 325 TABLET, FILM COATED ORAL at 20:27

## 2023-01-01 RX ADMIN — FUROSEMIDE 80 MG: 10 INJECTION, SOLUTION INTRAMUSCULAR; INTRAVENOUS at 08:36

## 2023-01-01 RX ADMIN — GUAIFENESIN 600 MG: 600 TABLET, EXTENDED RELEASE ORAL at 08:35

## 2023-01-01 RX ADMIN — ALBUTEROL SULFATE 2.5 MG: 2.5 SOLUTION RESPIRATORY (INHALATION) at 11:18

## 2023-01-01 RX ADMIN — GABAPENTIN 300 MG: 300 CAPSULE ORAL at 17:01

## 2023-01-01 RX ADMIN — GABAPENTIN 300 MG: 300 CAPSULE ORAL at 20:27

## 2023-01-01 RX ADMIN — ALBUTEROL SULFATE 2.5 MG: 2.5 SOLUTION RESPIRATORY (INHALATION) at 23:16

## 2023-01-01 RX ADMIN — PANTOPRAZOLE SODIUM 40 MG: 40 TABLET, DELAYED RELEASE ORAL at 08:36

## 2023-01-01 RX ADMIN — ACETYLCYSTEINE 3 ML: 200 SOLUTION ORAL; RESPIRATORY (INHALATION) at 11:22

## 2023-01-01 RX ADMIN — POTASSIUM CHLORIDE 40 MEQ: 750 TABLET, EXTENDED RELEASE ORAL at 20:27

## 2023-01-01 RX ADMIN — ALBUTEROL SULFATE 2.5 MG: 2.5 SOLUTION RESPIRATORY (INHALATION) at 19:26

## 2023-01-01 RX ADMIN — Medication 10 ML: at 20:27

## 2023-01-01 RX ADMIN — ACETYLCYSTEINE 3 ML: 200 SOLUTION ORAL; RESPIRATORY (INHALATION) at 00:41

## 2023-01-01 RX ADMIN — Medication 10 ML: at 08:38

## 2023-01-01 RX ADMIN — BUDESONIDE 0.5 MG: 0.5 INHALANT ORAL at 19:26

## 2023-01-01 RX ADMIN — ACETAMINOPHEN 650 MG: 325 TABLET, FILM COATED ORAL at 04:22

## 2023-01-01 RX ADMIN — POTASSIUM CHLORIDE 40 MEQ: 750 TABLET, EXTENDED RELEASE ORAL at 17:01

## 2023-01-01 RX ADMIN — ALBUTEROL SULFATE 2.5 MG: 2.5 SOLUTION RESPIRATORY (INHALATION) at 15:34

## 2023-01-01 RX ADMIN — ROPINIROLE HYDROCHLORIDE 0.5 MG: 0.5 TABLET, FILM COATED ORAL at 20:27

## 2023-01-01 RX ADMIN — ALBUTEROL SULFATE 2.5 MG: 2.5 SOLUTION RESPIRATORY (INHALATION) at 07:26

## 2023-01-01 RX ADMIN — METHYLPHENIDATE HYDROCHLORIDE 10 MG: 10 TABLET ORAL at 08:36

## 2023-01-01 RX ADMIN — ALBUTEROL SULFATE 2.5 MG: 2.5 SOLUTION RESPIRATORY (INHALATION) at 00:40

## 2023-01-01 RX ADMIN — ALBUTEROL SULFATE 2.5 MG: 2.5 SOLUTION RESPIRATORY (INHALATION) at 03:18

## 2023-01-01 RX ADMIN — GUAIFENESIN 600 MG: 600 TABLET, EXTENDED RELEASE ORAL at 20:27

## 2023-01-01 RX ADMIN — SODIUM CHLORIDE SOLN NEBU 7% 4 ML: 7 NEBU SOLN at 19:26

## 2023-01-01 RX ADMIN — SODIUM CHLORIDE SOLN NEBU 7% 4 ML: 7 NEBU SOLN at 07:33

## 2023-01-01 RX ADMIN — ACETYLCYSTEINE 3 ML: 200 SOLUTION ORAL; RESPIRATORY (INHALATION) at 23:16

## 2023-01-01 RX ADMIN — FUROSEMIDE 80 MG: 10 INJECTION, SOLUTION INTRAMUSCULAR; INTRAVENOUS at 17:01

## 2023-01-01 RX ADMIN — BUDESONIDE 0.5 MG: 0.5 INHALANT ORAL at 07:30

## 2023-01-01 RX ADMIN — GABAPENTIN 300 MG: 300 CAPSULE ORAL at 08:36

## 2023-01-01 RX ADMIN — POTASSIUM CHLORIDE 20 MEQ: 750 TABLET, EXTENDED RELEASE ORAL at 08:36

## 2023-01-01 NOTE — PLAN OF CARE
Goal Outcome Evaluation:  Plan of Care Reviewed With: patient        Progress: no change  Outcome Evaluation: Appetite good. VSS. No complaints of pain. Up to bathroom with assist and up to chair with assist. Replacing potassium per protocol. IV lasix continues. Strong congested cough occassionally productive. WCTM.

## 2023-01-01 NOTE — PROGRESS NOTES
PROGRESS NOTE  Patient Name: Tony Leonard  Age/Sex: 82 y.o. male  : 1940  MRN: [id number]    Date of Admission: 2022  Date of Encounter Visit: 23   LOS: 2 days   Patient Care Team:  Erich Aviles MD as PCP - General (Internal Medicine)  Katharina Salter MD as Consulting Physician (Pulmonary Disease)  Anum Bhatt MD as Consulting Physician (Pain Medicine)  Azeb Cook RN as Ambulatory  ([facility])  Otilia Shepard MSW as  (Amb Case Mgmt) ([facility])    Chief Complaint: Acute on chronic respiratory failure, COPD, recent pulmonary embolus, edema    Hospital course: Patient is doing much better, he is down to 2 L/min nasal cannula oxygen.  He is on aggressive pulmonary hygiene measures and he was able to expectorate much and he does sound better on exam today and feels better himself.  He is afebrile, on no antibiotic with no suspected infection and he does take the Zithromax 3 times a week as a maintenance therapy.        REVIEW OF SYSTEMS:   CONSTITUTIONAL: no fever or chills  CARDIOVASCULAR: No chest pain, chest pressure or chest discomfort. No palpitations, positive edema, improving.   RESPIRATORY: Improved shortness of breath, less productive cough.   GASTROINTESTINAL: No anorexia, nausea, vomiting or diarrhea. No abdominal pain or blood.   HEMATOLOGIC: No bleeding or bruising.     Ventilator/Non-Invasive Ventilation Settings (From admission, onward)    2 L/min            Vital Signs  Temp:  [97.7 °F (36.5 °C)-98.8 °F (37.1 °C)] 97.7 °F (36.5 °C)  Heart Rate:  [] 95  Resp:  [16-22] 18  BP: (106-130)/(63-71) 130/65  SpO2:  [91 %-95 %] 94 %  on  Flow (L/min):  [2] 2 Device (Oxygen Therapy): nasal cannula    Intake/Output Summary (Last 24 hours) at 2023 1245  Last data filed at 2023 1016  Gross per 24 hour   Intake 480 ml   Output 2200 ml   Net -1720 ml     Flowsheet Rows    Flowsheet Row First Filed Value   Admission Height  "180.3 cm (71\") Documented at 12/29/2022 1136   Admission Weight 79.8 kg (176 lb) Documented at 12/29/2022 1136        Body mass index is 24.55 kg/m².      12/29/22  1136   Weight: 79.8 kg (176 lb)       Physical Exam:  GEN:  No acute distress, alert, cooperative, well developed   EYES:   Sclerae clear. No icterus. PERRL. Normal EOM  ENT:   External ears/nose normal, no oral lesions, no thrush, mucous membranes moist  NECK:  Supple, midline trachea, no JVD  LUNGS: Normal chest on inspection, minimal rhonchi, no crackles. Respirations regular, even and unlabored.   CV:  Regular rhythm and rate. Normal S1/S2. No murmurs, gallops, or rubs noted.  ABD:  Soft, nontender and nondistended. Normal bowel sounds. No guarding  EXT:  Moves all extremities well. No cyanosis. No redness.  Positive lower extremity edema but improving  Skin: Dry, intact, no bleeding    Results Review:    Results From Last 14 Days   Lab Units 12/29/22  1204   LACTATE mmol/L 1.4     Results from last 7 days   Lab Units 01/01/23  1136 12/31/22  0822 12/30/22  0643 12/29/22  1204   SODIUM mmol/L 135* 139 139 136   POTASSIUM mmol/L 3.4* 3.0* 3.5 4.2   CHLORIDE mmol/L 96* 98 101 102   CO2 mmol/L 29.0 29.4* 26.9 25.1   BUN mg/dL 33* 27* 16 11   CREATININE mg/dL 0.96 1.21 1.18 0.96   CALCIUM mg/dL 8.8 8.5* 8.5* 9.4   AST (SGOT) U/L  --   --  16 27   ALT (SGPT) U/L  --   --  10 19   ANION GAP mmol/L 10.0 11.6 11.1 8.9   ALBUMIN g/dL  --   --  3.4* 4.1     Results from last 7 days   Lab Units 12/29/22  1204   TROPONIN T ng/mL 0.012         Results from last 7 days   Lab Units 12/29/22  1204   PROBNP pg/mL 557.0     Results from last 7 days   Lab Units 01/01/23  1136 12/31/22  0823 12/30/22  0643 12/29/22  1204   WBC 10*3/mm3 9.31 9.36 9.09 8.63   HEMOGLOBIN g/dL 12.2* 12.1* 11.8* 13.2   HEMATOCRIT % 35.7* 34.9* 33.8* 38.6   PLATELETS 10*3/mm3 250 238 225 233   MCV fL 94.7 91.6 91.8 95.5   NEUTROPHIL % % 59.0 59.4 53.4 58.3   LYMPHOCYTE % % 17.0* 16.5* 15.8* " 16.2*   MONOCYTES % % 12.2* 12.9* 15.6* 11.2   EOSINOPHIL % % 11.1* 10.6* 14.1* 13.3*   BASOPHIL % % 0.4 0.4 0.9 0.9   IMM GRAN % % 0.3 0.2 0.2 0.1                   Invalid input(s): LDLCALC          No results found for: POCGLU  Results from last 7 days   Lab Units 12/31/22  0822 12/29/22  1204   PROCALCITONIN ng/mL 0.16 0.12   LACTATE mmol/L  --  1.4     Results from last 7 days   Lab Units 12/29/22  1204   BLOODCX  No growth at 3 days  No growth at 3 days         Results from last 7 days   Lab Units 12/31/22  1142   COVID19  Not Detected   ADENOVIRUS DETECTION BY PCR  Not Detected   CORONAVIRUS 229E  Not Detected   CORONAVIRUS HKU1  Not Detected   CORONAVIRUS NL63  Not Detected   CORONAVIRUS OC43  Not Detected   HUMAN METAPNEUMOVIRUS  Not Detected   HUMAN RHINOVIRUS/ENTEROVIRUS  Detected*   INFLUENZA B PCR  Not Detected   PARAINFLUENZA 1  Not Detected   PARAINFLUENZA VIRUS 2  Not Detected   PARAINFLUENZA VIRUS 3  Not Detected   PARAINFLUENZA VIRUS 4  Not Detected   BORDETELLA PERTUSSIS PCR  Not Detected   BORDETELLA PARAPERTUSSIS PCR  Not Detected   CHLAMYDOPHILA PNEUMONIAE PCR  Not Detected   MYCOPLAMA PNEUMO PCR  Not Detected   RSV, PCR  Not Detected               Imaging:   Imaging Results (All)     Procedure Component Value Units Date/Time    XR Chest 1 View [[id number]] Collected: 12/29/22 1232     Updated: 12/29/22 1237    Narrative:      XR CHEST 1 VW-     HISTORY: Male who is 82 years-old,  short of breath     TECHNIQUE: Frontal view of the chest     COMPARISON: 12 /9/2022     FINDINGS: Heart, mediastinum and pulmonary vasculature are unremarkable.  Hazy opacity at the right apex may represent infiltrate, follow-up  recommended. No pleural effusion, or pneumothorax. No acute osseous  process.       Impression:      Hazy opacity at the right apex, follow-up recommended.     This report was finalized on 12/29/2022 12:34 PM by Dr. Tj Evans M.D.             I reviewed the patient's new clinical  results.  I personally viewed and interpreted the patient's imaging results:        Medication Review:   acetylcysteine, 3 mL, Nebulization, BID - RT  albuterol, 2.5 mg, Nebulization, Q4H - RT  budesonide, 0.5 mg, Nebulization, BID - RT  furosemide, 80 mg, Intravenous, BID  gabapentin, 300 mg, Oral, TID  guaiFENesin, 600 mg, Oral, Q12H  methylphenidate, 10 mg, Oral, Daily  pantoprazole, 40 mg, Oral, Daily  potassium chloride, 20 mEq, Oral, Daily  rOPINIRole, 0.5 mg, Oral, Nightly  sodium chloride, 10 mL, Intravenous, Q12H  sodium chloride, 4 mL, Nebulization, BID - RT             ASSESSMENT:   1. Acute on chronic hypoxic respiratory failure  2. Infiltrate of right upper lobe  3. 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 History of recurrent mucous plugging  5. Home oxygen therapy, 4 L/min  6. Recent saddle pulmonary emboli on chronic anticoagulation (Eliquis)  7. GERD  8. Spinal stenosis    PLAN:  Continues to clinically improve,  Continue with the mucolytic therapy  Diuresis as tolerated  We will continue with the 3 times a week Zithromax as part of his home regimen  We will continue to follow    Discussed with patient    Disposition: Home    Katharina Salter MD  01/01/23  12:45 EST           Dictated utilizing Dragon dictation

## 2023-01-01 NOTE — PROGRESS NOTES
"DAILY PROGRESS NOTE  [facility]    Patient Identification:  Name: Tony Leonard  Age: 82 y.o.  Sex: male  :  1940  MRN: [id number]         Primary Care Physician: Erich Aviles MD      Subjective  Patient on 2L via nasal cannula. He had significant coughing with chest physiotherapy and reports he feels better.     Objective:  General Appearance:  Comfortable and in no acute distress (Chronically ill-appearing.).    Vital signs: (most recent): Blood pressure 127/73, pulse 95, temperature 97.6 °F (36.4 °C), temperature source Oral, resp. rate 18, height 180.3 cm (71\"), weight 79.8 kg (176 lb), SpO2 95 %.    Lungs:  Normal effort and normal respiratory rate.  He is not in respiratory distress.  No stridor.  There are wheezes and rhonchi.  No rales or decreased breath sounds.  (Rhonchi throughout.  Occasional wheeze.)  Heart: Normal rate.  Regular rhythm.  S1 normal.    Extremities: There is no dependent edema.    Neurological: Patient is alert and oriented to person, place and time.    Skin:  Warm and dry.            Vital signs in last 24 hours:  Temp:  [97.6 °F (36.4 °C)-98.8 °F (37.1 °C)] 97.6 °F (36.4 °C)  Heart Rate:  [] 95  Resp:  [16-22] 18  BP: (106-130)/(63-73) 127/73    Intake/Output:    Intake/Output Summary (Last 24 hours) at 2023 1415  Last data filed at 2023 1304  Gross per 24 hour   Intake 480 ml   Output 2600 ml   Net -2120 ml         Results from last 7 days   Lab Units 23  1136 22  0823 22  0643 22  1204   WBC 10*3/mm3 9.31 9.36 9.09 8.63   HEMOGLOBIN g/dL 12.2* 12.1* 11.8* 13.2   PLATELETS 10*3/mm3 250 238 225 233     Results from last 7 days   Lab Units 23  1136 22  0822 22  0643 22  1204   SODIUM mmol/L 135* 139 139 136   POTASSIUM mmol/L 3.4* 3.0* 3.5 4.2   CHLORIDE mmol/L 96* 98 101 102   CO2 mmol/L 29.0 29.4* 26.9 25.1   BUN mg/dL 33* 27* 16 11   CREATININE mg/dL 0.96 1.21 1.18 0.96   GLUCOSE mg/dL 118* " 109* 90 85   Estimated Creatinine Clearance: 64.7 mL/min (by C-G formula based on SCr of 0.96 mg/dL).  Results from last 7 days   Lab Units 01/01/23  1136 12/31/22  0822 12/30/22  0643 12/29/22  1204   CALCIUM mg/dL 8.8 8.5* 8.5* 9.4   ALBUMIN g/dL  --   --  3.4* 4.1     Results from last 7 days   Lab Units 12/30/22  0643 12/29/22  1204   ALBUMIN g/dL 3.4* 4.1   BILIRUBIN mg/dL 0.8 0.9   ALK PHOS U/L 71 86   AST (SGOT) U/L 16 27   ALT (SGPT) U/L 10 19       Assessment:  Acute on chronic hypoxic respiratory failure/COPD/bronchiectasis : Suspect secondary to COPD exacerbation.  Patient on 2L via NC today; better than baseline.   Appreciate pulm seeing him.   Acute on chronic CHF with preserved LVH: IV lasix for volume overload- improvement in O2.  Pulmonary infiltrate:   Normal procalcitonin.  No fever.  No leukocytosis.  Monitor off abx.   Hypokalemia: replace per protocol  Recent history of saddle pulmonary embolus: Continue Eliquis.  Chronic pain: Continue home medications.  ADD: Continue home medications.    Plan:  Please see above.    Elisa Tony MD  1/1/2023  14:15 EST

## 2023-01-02 LAB
ANION GAP SERPL CALCULATED.3IONS-SCNC: 11.5 MMOL/L (ref 5–15)
BASOPHILS # BLD AUTO: 0.04 10*3/MM3 (ref 0–0.2)
BASOPHILS NFR BLD AUTO: 0.3 % (ref 0–1.5)
BUN SERPL-MCNC: 29 MG/DL (ref 8–23)
BUN/CREAT SERPL: 36.3 (ref 7–25)
CALCIUM SPEC-SCNC: 9 MG/DL (ref 8.6–10.5)
CHLORIDE SERPL-SCNC: 98 MMOL/L (ref 98–107)
CO2 SERPL-SCNC: 27.5 MMOL/L (ref 22–29)
CREAT SERPL-MCNC: 0.8 MG/DL (ref 0.76–1.27)
DEPRECATED RDW RBC AUTO: 44.5 FL (ref 37–54)
EGFRCR SERPLBLD CKD-EPI 2021: 87.3 ML/MIN/1.73
EOSINOPHIL # BLD AUTO: 1.7 10*3/MM3 (ref 0–0.4)
EOSINOPHIL NFR BLD AUTO: 13.7 % (ref 0.3–6.2)
ERYTHROCYTE [DISTWIDTH] IN BLOOD BY AUTOMATED COUNT: 12.9 % (ref 12.3–15.4)
GLUCOSE SERPL-MCNC: 110 MG/DL (ref 65–99)
HCT VFR BLD AUTO: 36 % (ref 37.5–51)
HGB BLD-MCNC: 12.1 G/DL (ref 13–17.7)
IMM GRANULOCYTES # BLD AUTO: 0.05 10*3/MM3 (ref 0–0.05)
IMM GRANULOCYTES NFR BLD AUTO: 0.4 % (ref 0–0.5)
LYMPHOCYTES # BLD AUTO: 2.3 10*3/MM3 (ref 0.7–3.1)
LYMPHOCYTES NFR BLD AUTO: 18.6 % (ref 19.6–45.3)
MCH RBC QN AUTO: 32.1 PG (ref 26.6–33)
MCHC RBC AUTO-ENTMCNC: 33.6 G/DL (ref 31.5–35.7)
MCV RBC AUTO: 95.5 FL (ref 79–97)
MONOCYTES # BLD AUTO: 1.21 10*3/MM3 (ref 0.1–0.9)
MONOCYTES NFR BLD AUTO: 9.8 % (ref 5–12)
NEUTROPHILS NFR BLD AUTO: 57.2 % (ref 42.7–76)
NEUTROPHILS NFR BLD AUTO: 7.08 10*3/MM3 (ref 1.7–7)
NRBC BLD AUTO-RTO: 0 /100 WBC (ref 0–0.2)
PLATELET # BLD AUTO: 259 10*3/MM3 (ref 140–450)
PMV BLD AUTO: 9 FL (ref 6–12)
POTASSIUM SERPL-SCNC: 4.2 MMOL/L (ref 3.5–5.2)
POTASSIUM SERPL-SCNC: 4.5 MMOL/L (ref 3.5–5.2)
RBC # BLD AUTO: 3.77 10*6/MM3 (ref 4.14–5.8)
SODIUM SERPL-SCNC: 137 MMOL/L (ref 136–145)
WBC NRBC COR # BLD: 12.38 10*3/MM3 (ref 3.4–10.8)

## 2023-01-02 PROCEDURE — 87205 SMEAR GRAM STAIN: CPT | Performed by: INTERNAL MEDICINE

## 2023-01-02 PROCEDURE — 94799 UNLISTED PULMONARY SVC/PX: CPT

## 2023-01-02 PROCEDURE — 94669 MECHANICAL CHEST WALL OSCILL: CPT

## 2023-01-02 PROCEDURE — 87070 CULTURE OTHR SPECIMN AEROBIC: CPT | Performed by: INTERNAL MEDICINE

## 2023-01-02 PROCEDURE — 94668 MNPJ CHEST WALL SBSQ: CPT

## 2023-01-02 PROCEDURE — 25010000002 FUROSEMIDE PER 20 MG: Performed by: INTERNAL MEDICINE

## 2023-01-02 PROCEDURE — 85025 COMPLETE CBC W/AUTO DIFF WBC: CPT | Performed by: STUDENT IN AN ORGANIZED HEALTH CARE EDUCATION/TRAINING PROGRAM

## 2023-01-02 PROCEDURE — 80048 BASIC METABOLIC PNL TOTAL CA: CPT | Performed by: STUDENT IN AN ORGANIZED HEALTH CARE EDUCATION/TRAINING PROGRAM

## 2023-01-02 PROCEDURE — 94664 DEMO&/EVAL PT USE INHALER: CPT

## 2023-01-02 PROCEDURE — 94761 N-INVAS EAR/PLS OXIMETRY MLT: CPT

## 2023-01-02 PROCEDURE — 97535 SELF CARE MNGMENT TRAINING: CPT

## 2023-01-02 RX ORDER — GUAIFENESIN AND DEXTROMETHORPHAN HYDROBROMIDE 600; 30 MG/1; MG/1
2 TABLET, EXTENDED RELEASE ORAL 2 TIMES DAILY PRN
Status: DISCONTINUED | OUTPATIENT
Start: 2023-01-02 | End: 2023-01-06 | Stop reason: HOSPADM

## 2023-01-02 RX ORDER — FLUTICASONE PROPIONATE 50 MCG
2 SPRAY, SUSPENSION (ML) NASAL DAILY
Status: DISCONTINUED | OUTPATIENT
Start: 2023-01-02 | End: 2023-01-06 | Stop reason: HOSPADM

## 2023-01-02 RX ORDER — AZELASTINE 1 MG/ML
2 SPRAY, METERED NASAL 2 TIMES DAILY
Status: DISCONTINUED | OUTPATIENT
Start: 2023-01-02 | End: 2023-01-06 | Stop reason: HOSPADM

## 2023-01-02 RX ADMIN — APIXABAN 10 MG: 5 TABLET, FILM COATED ORAL at 20:20

## 2023-01-02 RX ADMIN — APIXABAN 5 MG: 5 TABLET, FILM COATED ORAL at 12:50

## 2023-01-02 RX ADMIN — PANTOPRAZOLE SODIUM 40 MG: 40 TABLET, DELAYED RELEASE ORAL at 09:37

## 2023-01-02 RX ADMIN — ALBUTEROL SULFATE 2.5 MG: 2.5 SOLUTION RESPIRATORY (INHALATION) at 11:57

## 2023-01-02 RX ADMIN — GABAPENTIN 300 MG: 300 CAPSULE ORAL at 20:20

## 2023-01-02 RX ADMIN — Medication 10 ML: at 20:21

## 2023-01-02 RX ADMIN — ALBUTEROL SULFATE 2.5 MG: 2.5 SOLUTION RESPIRATORY (INHALATION) at 03:35

## 2023-01-02 RX ADMIN — ALBUTEROL SULFATE 2.5 MG: 2.5 SOLUTION RESPIRATORY (INHALATION) at 15:43

## 2023-01-02 RX ADMIN — SODIUM CHLORIDE SOLN NEBU 7% 4 ML: 7 NEBU SOLN at 21:12

## 2023-01-02 RX ADMIN — BUDESONIDE 0.5 MG: 0.5 INHALANT ORAL at 21:09

## 2023-01-02 RX ADMIN — ACETYLCYSTEINE 3 ML: 200 SOLUTION ORAL; RESPIRATORY (INHALATION) at 23:50

## 2023-01-02 RX ADMIN — Medication 10 ML: at 09:38

## 2023-01-02 RX ADMIN — ROPINIROLE HYDROCHLORIDE 0.5 MG: 0.5 TABLET, FILM COATED ORAL at 20:20

## 2023-01-02 RX ADMIN — ALBUTEROL SULFATE 2.5 MG: 2.5 SOLUTION RESPIRATORY (INHALATION) at 07:11

## 2023-01-02 RX ADMIN — BUDESONIDE 0.5 MG: 0.5 INHALANT ORAL at 07:14

## 2023-01-02 RX ADMIN — APIXABAN 5 MG: 5 TABLET, FILM COATED ORAL at 11:34

## 2023-01-02 RX ADMIN — METHYLPHENIDATE HYDROCHLORIDE 10 MG: 10 TABLET ORAL at 09:37

## 2023-01-02 RX ADMIN — GUAIFENESIN AND DEXTROMETHORPHAN HYDROBROMIDE 2 TABLET: 600; 30 TABLET, EXTENDED RELEASE ORAL at 18:08

## 2023-01-02 RX ADMIN — FUROSEMIDE 80 MG: 10 INJECTION, SOLUTION INTRAMUSCULAR; INTRAVENOUS at 18:15

## 2023-01-02 RX ADMIN — AZITHROMYCIN DIHYDRATE 250 MG: 250 TABLET, FILM COATED ORAL at 09:37

## 2023-01-02 RX ADMIN — AZELASTINE HYDROCHLORIDE 2 SPRAY: 137 SPRAY, METERED NASAL at 20:21

## 2023-01-02 RX ADMIN — GUAIFENESIN 600 MG: 600 TABLET, EXTENDED RELEASE ORAL at 20:20

## 2023-01-02 RX ADMIN — GUAIFENESIN 600 MG: 600 TABLET, EXTENDED RELEASE ORAL at 09:37

## 2023-01-02 RX ADMIN — SODIUM CHLORIDE SOLN NEBU 7% 4 ML: 7 NEBU SOLN at 07:18

## 2023-01-02 RX ADMIN — FLUTICASONE PROPIONATE 2 SPRAY: 50 SPRAY, METERED NASAL at 18:09

## 2023-01-02 RX ADMIN — ALBUTEROL SULFATE 2.5 MG: 2.5 SOLUTION RESPIRATORY (INHALATION) at 21:09

## 2023-01-02 RX ADMIN — FUROSEMIDE 80 MG: 10 INJECTION, SOLUTION INTRAMUSCULAR; INTRAVENOUS at 09:38

## 2023-01-02 RX ADMIN — ACETAMINOPHEN 500 MG: 500 TABLET, FILM COATED ORAL at 06:24

## 2023-01-02 RX ADMIN — ALBUTEROL SULFATE 2.5 MG: 2.5 SOLUTION RESPIRATORY (INHALATION) at 23:50

## 2023-01-02 RX ADMIN — ACETYLCYSTEINE 3 ML: 200 SOLUTION ORAL; RESPIRATORY (INHALATION) at 12:00

## 2023-01-02 RX ADMIN — GABAPENTIN 300 MG: 300 CAPSULE ORAL at 16:57

## 2023-01-02 RX ADMIN — GABAPENTIN 300 MG: 300 CAPSULE ORAL at 09:37

## 2023-01-02 RX ADMIN — POTASSIUM CHLORIDE 20 MEQ: 750 TABLET, EXTENDED RELEASE ORAL at 09:38

## 2023-01-02 NOTE — THERAPY TREATMENT NOTE
Patient Name: Tony Leonard  : 1940    MRN: [id number]                              Today's Date: 2023       Admit Date: 2022    Visit Dx:     ICD-10-CM ICD-9-CM   1. Pneumonia of right upper lobe due to infectious organism  J18.9 486   2. Other acute pulmonary embolism without acute cor pulmonale ([facility])  I26.99 415.19   3. Acute on chronic congestive heart failure, unspecified heart failure type ([facility])  I50.9 428.0     Patient Active Problem List   Diagnosis   • Thrush, oral   • ADD (attention deficit disorder)   • Hemorrhoids   • Bronchiectasis with acute lower respiratory infection ([facility])   • Diverticul disease small and large intestine, no perforati or abscess   • Benign non-nodular prostatic hyperplasia without lower urinary tract symptoms   • Gastroesophageal reflux disease   • Malaise and fatigue   • Environmental allergies   • Hemoptysis   • Dyslipidemia   • Primary osteoarthritis involving multiple joints   • Pneumonia of right lower lobe due to infectious organism   • Abnormal EKG   • COPD (chronic obstructive pulmonary disease) ([facility])   • Mild malnutrition ([facility])   • Acute on chronic respiratory failure with hypoxia ([facility])   • Fracture, intertrochanteric, right femur, closed, initial encounter ([facility])   • Chronic diastolic CHF (congestive heart failure) ([facility])   • Hematoma of frontal scalp   • Postoperative anemia due to acute blood loss   • Urinary retention   • Hemorrhagic disorder due to circulating anticoagulants ([facility])   • History of fracture of right hip   • Closed fracture of right hip with routine healing   • Chronic low back pain with sciatica   • Change in bowel function   • Rectal bleeding   • Prostate cancer ([facility])   • On home oxygen therapy   • Acute viral bronchitis   • Chronic diastolic (congestive) heart failure ([facility])   • Peripheral neuropathy   • Plantar fasciitis   • COPD exacerbation ([facility])   • Bilateral lower extremity edema   • Microscopic hematuria   • Acute midline low back  pain with right-sided sciatica   • Spinal stenosis, lumbar region with neurogenic claudication   • Compression deformity of vertebra   • Rectal bleed   • Esophagitis   • Angiectasia   • Hiatal hernia   • Overweight (BMI 25.0-29.9)   • Leukocytosis   • Bilateral hip pain   • Elevated troponin level not due myocardial infarction   • Nocturnal hypoxia   • Acute saddle pulmonary embolism without acute cor pulmonale ([facility]) - 12/11/2022   • Pneumonia of right upper lobe due to infectious organism     Past Medical History:   Diagnosis Date   • ADHD (attention deficit hyperactivity disorder)    • Bronchiectasis ([facility])    • CHF (congestive heart failure) ([facility])    • Colon polyp    • COPD (chronic obstructive pulmonary disease) ([facility])    • Diverticulosis    • Hard of hearing    • On home oxygen therapy     2 LITERS   • Pneumonia    • Prostate cancer ([facility]) 04/22/2019   • Spinal stenosis, lumbar region with neurogenic claudication 08/02/2022     Past Surgical History:   Procedure Laterality Date   • BRONCHOSCOPY N/A 4/30/2016    Procedure: BRONCHOSCOPY with BAL of right lower lobe and left  lower lobe.  ;  Surgeon: Nando Diaz MD;  Location: [facility] ENDOSCOPY;  Service:    • BRONCHOSCOPY N/A 4/28/2017    Procedure: BRONCHOSCOPY;  Surgeon: Katharina Salter MD;  Location: [facility] ENDOSCOPY;  Service:    • BRONCHOSCOPY Bilateral 6/29/2017    Procedure: BRONCHOSCOPY WITH WASHINGS;  Surgeon: Katharina Salter MD;  Location: [facility] ENDOSCOPY;  Service:    • BRONCHOSCOPY N/A 1/22/2018    Procedure: BRONCHOSCOPY AT BEDSIDE with BAL;  Surgeon: Katharina Salter MD;  Location: [facility] ENDOSCOPY;  Service:    • BRONCHOSCOPY N/A 1/30/2018    Procedure: BRONCHOSCOPY with BAL and washing;  Surgeon: Shreyas Wild MD;  Location: [facility] ENDOSCOPY;  Service:    • BRONCHOSCOPY Bilateral 4/24/2018    Procedure: BRONCHOSCOPY WITH WASHING;  Surgeon: Katharina Salter MD;  Location: [facility] ENDOSCOPY;  Service: Pulmonary   • BRONCHOSCOPY N/A 12/28/2019     Procedure: BRONCHOSCOPY with bilateral washing;  Surgeon: Katharina Salter MD;  Location: [facility] ENDOSCOPY;  Service: Pulmonary   • COLONOSCOPY  05/17/2013    eh, ih, tort, sig tics   • COLONOSCOPY N/A 5/10/2019    Non-thrombosed external hemorrhoids found on perianal exam, Diverticulosis, Tortuous colon, One 5 mm polyp in the mid ascending colon, IH. Path: Tubular adenoma.    • COLONOSCOPY N/A 9/24/2022    Procedure: COLONOSCOPY to cecum and TI with argon plasma coagulation.;  Surgeon: Bruce Black MD;  [facility];  Service: Gastroenterology;  Laterality: N/A;  pre- GI bleeding  post- radiation proctitis, diverticulosis   • ENDOSCOPY  03/19/2015    z line irreg, hh   • ENDOSCOPY N/A 9/24/2022    Procedure: ESOPHAGOGASTRODUODENOSCOPY;  Surgeon: Bruce Black MD;  Location: [facility] ENDOSCOPY;  Service: Gastroenterology;  Laterality: N/A;  pre- GI bleeding  post- esophagitis, hiatal hernia   • HIP OPEN REDUCTION Right 1/17/2018    Procedure: HIP OPEN REDUCTION INTERNAL FIXATION WITH DYNAMIC HIP SCREW;  Surgeon: Les Black MD;  Location: [facility] MAIN OR;  Service:    • SPINE SURGERY     • TONSILLECTOMY     • TOTAL HIP ARTHROPLASTY REVISION Right 9/23/2019    Procedure: HIP  REVISION RIGHT;  Surgeon: Isauro Warner MD;  Location: [facility] MAIN OR;  Service: Orthopedics      General Information     Row Name 01/02/23 1302          OT Time and Intention    Document Type therapy note (daily note)  -MW     Mode of Treatment occupational therapy;individual therapy  -MW     Row Name 01/02/23 1302          General Information    Patient Profile Reviewed yes  -MW     Existing Precautions/Restrictions fall;oxygen therapy device and L/min  -MW     Row Name 01/02/23 1302          Cognition    Orientation Status (Cognition) oriented x 3  -MW     Row Name 01/02/23 1302          Safety Issues, Functional Mobility    Impairments Affecting Function (Mobility) balance;endurance/activity  "tolerance;strength;shortness of breath;postural/trunk control  -           User Key  (r) = Recorded By, (t) = Taken By, (c) = Cosigned By    Initials Name Provider Type    MW Shawna Beauchamp OT Occupational Therapist                 Mobility/ADL's     Row Name 01/02/23 1302          Bed Mobility    Bed Mobility supine-sit;sit-supine  -     Supine-Sit Jennings (Bed Mobility) standby assist  -     Sit-Supine Jennings (Bed Mobility) minimum assist (75% patient effort)  -     Assistive Device (Bed Mobility) bed rails;head of bed elevated  -     Row Name 01/02/23 1302          Transfers    Transfers sit-stand transfer;stand-sit transfer  -     Row Name 01/02/23 1302          Sit-Stand Transfer    Sit-Stand Jennings (Transfers) contact guard  -     Assistive Device (Sit-Stand Transfers) walker, front-wheeled  -     Row Name 01/02/23 1302          Stand-Sit Transfer    Stand-Sit Jennings (Transfers) contact guard  -     Assistive Device (Stand-Sit Transfers) walker, front-wheeled  -     Row Name 01/02/23 1302          Functional Mobility    Functional Mobility- Ind. Level contact guard assist  -     Functional Mobility- Device walker, front-wheeled  -     Functional Mobility- Comment pt demo func mob to sink side for g.h, CGA with RWx, poor posture onto walker, pt reports \"he helps with my back.\"  -     Row Name 01/02/23 1302          Activities of Daily Living    BADL Assessment/Intervention grooming;toileting  -     Row Name 01/02/23 1302          Grooming Assessment/Training    Jennings Level (Grooming) grooming skills;set up;standby assist;oral care regimen  -     Position (Grooming) sink side;supported standing  -     Row Name 01/02/23 1302          Toileting Assessment/Training    Jennings Level (Toileting) toileting skills;set up  -     Assistive Devices (Toileting) urinal  -     Position (Toileting) supported standing  -           User Key  (r) = " Recorded By, (t) = Taken By, (c) = Cosigned By    Initials Name Provider Type    Shawna Holguin OT Occupational Therapist               Obj/Interventions     Row Name 01/02/23 1303          Motor Skills    Motor Skills functional endurance  -     Functional Endurance fair, on 3L throughout mobility  -MW     Row Name 01/02/23 1303          Balance    Balance Assessment sitting static balance;sitting dynamic balance;sit to stand dynamic balance;standing static balance;standing dynamic balance  -MW     Static Sitting Balance standby assist  -MW     Dynamic Sitting Balance standby assist  -MW     Position, Sitting Balance sitting edge of bed  -MW     Sit to Stand Dynamic Balance contact guard  -MW     Static Standing Balance contact guard  -MW     Dynamic Standing Balance contact guard  -MW     Position/Device Used, Standing Balance supported;walker, front-wheeled  -     Balance Interventions occupation based/functional task;sitting;standing;sit to stand;supported;static;dynamic;minimal challenge  -MW     Comment, Balance no overt LOBs  -           User Key  (r) = Recorded By, (t) = Taken By, (c) = Cosigned By    Initials Name Provider Type    Shawna Holguin OT Occupational Therapist               Goals/Plan    No documentation.                Clinical Impression     Row Name 01/02/23 1304          Pain Assessment    Pretreatment Pain Rating 0/10 - no pain  -MW     Posttreatment Pain Rating 0/10 - no pain  -MW     Row Name 01/02/23 1304          Plan of Care Review    Plan of Care Reviewed With patient  -     Progress improving  -     Outcome Evaluation Pt seen this date for OT tx session, agreeable and reports no pain  this date. Pt completed sup <> sit with SBA, s/up for urinal use in static standing, STS with CGA, mobility to sink side with CGA using Rwx, s/up and SBA for ADL task, increased time upright on 3L and upon returning back to EOB, 93% spO2 on monitor. Pt will continue to benefit  from skilled OT to address noted func deficits and maximize (I) prior to d/c. Pt plans home.  -MW     Row Name 01/02/23 1304          Therapy Plan Review/Discharge Plan (OT)    Anticipated Discharge Disposition (OT) home with home health;home  -MW     Row Name 01/02/23 1304          Vital Signs    O2 Delivery Pre Treatment supplemental O2  -MW     O2 Delivery Intra Treatment supplemental O2  -MW     Post SpO2 (%) 93  -MW     O2 Delivery Post Treatment supplemental O2  -MW     Pre Patient Position Supine  -MW     Intra Patient Position Standing  -MW     Post Patient Position Supine  -MW     Row Name 01/02/23 1304          Positioning and Restraints    Pre-Treatment Position in bed  -MW     Post Treatment Position bed  -MW     In Bed notified nsg;fowlers;call light within reach;encouraged to call for assist;side rails up x2  bed alarm did not work, alerted RN  -MW           User Key  (r) = Recorded By, (t) = Taken By, (c) = Cosigned By    Initials Name Provider Type    Shawna Holguin OT Occupational Therapist               Outcome Measures     Row Name 01/02/23 1306          How much help from another is currently needed...    Putting on and taking off regular lower body clothing? 2  -MW     Bathing (including washing, rinsing, and drying) 2  -MW     Toileting (which includes using toilet bed pan or urinal) 3  -MW     Putting on and taking off regular upper body clothing 3  -MW     Taking care of personal grooming (such as brushing teeth) 3  -MW     Eating meals 4  -MW     AM-PAC 6 Clicks Score (OT) 17  -MW     Row Name 01/02/23 1306          Functional Assessment    Outcome Measure Options AM-PAC 6 Clicks Daily Activity (OT)  -MW           User Key  (r) = Recorded By, (t) = Taken By, (c) = Cosigned By    Initials Name Provider Type    Shawna Holguin OT Occupational Therapist                Occupational Therapy Education     Title: PT OT SLP Therapies (In Progress)     Topic: Occupational Therapy (In  Progress)     Point: ADL training (Done)     Description:   Instruct learner(s) on proper safety adaptation and remediation techniques during self care or transfers.   Instruct in proper use of assistive devices.              Learning Progress Summary           Patient Acceptance, E, VU by  at 12/30/2022 6504    Comment: OT goals, pacing activity, PLB technique                   Point: Home exercise program (Not Started)     Description:   Instruct learner(s) on appropriate technique for monitoring, assisting and/or progressing therapeutic exercises/activities.              Learner Progress:  Not documented in this visit.          Point: Precautions (Not Started)     Description:   Instruct learner(s) on prescribed precautions during self-care and functional transfers.              Learner Progress:  Not documented in this visit.          Point: Body mechanics (Not Started)     Description:   Instruct learner(s) on proper positioning and spine alignment during self-care, functional mobility activities and/or exercises.              Learner Progress:  Not documented in this visit.                      User Key     Initials Effective Dates Name Provider Type Discipline     04/02/20 -  Madelyn Em OT Occupational Therapist OT              OT Recommendation and Plan     Plan of Care Review  Plan of Care Reviewed With: patient  Progress: improving  Outcome Evaluation: Pt seen this date for OT tx session, agreeable and reports no pain  this date. Pt completed sup <> sit with SBA, s/up for urinal use in static standing, STS with CGA, mobility to sink side with CGA using Rwx, s/up and SBA for ADL task, increased time upright on 3L and upon returning back to EOB, 93% spO2 on monitor. Pt will continue to benefit from skilled OT to address noted func deficits and maximize (I) prior to d/c. Pt plans home.     Time Calculation:    Time Calculation- OT     Row Name 01/02/23 8164             Time Calculation- OT    OT  Start Time 1025  -MW      OT Stop Time 1048  -MW      OT Time Calculation (min) 23 min  -MW      Total Timed Code Minutes- OT 23 minute(s)  -MW      OT Received On 01/02/23  -MW      OT - Next Appointment 01/03/23  -MW         Timed Charges    17733 - OT Self Care/Mgmt Minutes 23  -MW         Total Minutes    Timed Charges Total Minutes 23  -MW       Total Minutes 23  -MW            User Key  (r) = Recorded By, (t) = Taken By, (c) = Cosigned By    Initials Name Provider Type    Shawna Holguin OT Occupational Therapist              Therapy Charges for Today     Code Description Service Date Service Provider Modifiers Qty    [id number] HC OT SELF CARE/MGMT/TRAIN EA 15 MIN 1/2/2023 Shawna Beauchamp OT GO 2               Shawna Beauchamp OT  1/2/2023

## 2023-01-02 NOTE — PLAN OF CARE
Goal Outcome Evaluation:  Plan of Care Reviewed With: patient        Progress: improving  Outcome Evaluation: Pt seen this date for OT tx session, agreeable and reports no pain  this date. Pt completed sup <> sit with SBA, s/up for urinal use in static standing, STS with CGA, mobility to sink side with CGA using Rwx, s/up and SBA for ADL task, increased time upright on 3L and upon returning back to EOB, 93% spO2 on monitor. Pt will continue to benefit from skilled OT to address noted func deficits and maximize (I) prior to d/c. Pt plans home.

## 2023-01-02 NOTE — PLAN OF CARE
Goal Outcome Evaluation:  Plan of Care Reviewed With: patient        Progress: no change   VSS. Tylenol given for pain last PM. Pt slept most of shift. Pt using urinal. Good UOP. Very productive, wet cough. Pt aware that we need sputum sample but unable to cough up anything yet this shift. Pt's K+ replaced and came up to 4.5. Pt remains on 2L NC (baseline is 3L). Pt receiving breathing Tx, Chest PT, lasix IV BID, and zithromycin maintenance dose PO.

## 2023-01-02 NOTE — PROGRESS NOTES
"DAILY PROGRESS NOTE  Psychiatric    Patient Identification:  Name: Tony Leonard  Age: 82 y.o.  Sex: male  :  1940  MRN: [id number]         Primary Care Physician: Erich Aviles MD      Subjective  Patient on 2-3L via nasal cannula. Evaluated shortly after CPT. He reports feeling better. He has been unable to give sputum sample.    Objective:  General Appearance:  Comfortable and in no acute distress (Chronically ill-appearing.).    Vital signs: (most recent): Blood pressure 113/71, pulse 100, temperature 97.9 °F (36.6 °C), temperature source Oral, resp. rate 18, height 180.3 cm (71\"), weight 79.8 kg (176 lb), SpO2 92 %.    Lungs:  Normal effort and normal respiratory rate.  He is not in respiratory distress.  No stridor.  There are rhonchi (improving).  No rales, decreased breath sounds or wheezes.  (Rhonchi throughout. )  Heart: Normal rate.  Regular rhythm.  S1 normal.    Extremities: There is no dependent edema.    Neurological: Patient is alert and oriented to person, place and time.    Skin:  Warm and dry.            Vital signs in last 24 hours:  Temp:  [97.3 °F (36.3 °C)-97.9 °F (36.6 °C)] 97.9 °F (36.6 °C)  Heart Rate:  [] 100  Resp:  [18-20] 18  BP: (113-130)/(63-71) 113/71    Intake/Output:    Intake/Output Summary (Last 24 hours) at 2023 1629  Last data filed at 2023 1252  Gross per 24 hour   Intake 370 ml   Output 1925 ml   Net -1555 ml         Results from last 7 days   Lab Units 23  0811 23  1136 22  0823 22  0643 22  1204   WBC 10*3/mm3 12.38* 9.31 9.36 9.09 8.63   HEMOGLOBIN g/dL 12.1* 12.2* 12.1* 11.8* 13.2   PLATELETS 10*3/mm3 259 250 238 225 233     Results from last 7 days   Lab Units 23  0811 23  2358 23  1136 22  0822 22  0643 22  1204   SODIUM mmol/L 137  --  135* 139 139 136   POTASSIUM mmol/L 4.2 4.5 3.4* 3.0* 3.5 4.2   CHLORIDE mmol/L 98  --  96* 98 101 102   CO2 mmol/L 27.5  --  " 29.0 29.4* 26.9 25.1   BUN mg/dL 29*  --  33* 27* 16 11   CREATININE mg/dL 0.80  --  0.96 1.21 1.18 0.96   GLUCOSE mg/dL 110*  --  118* 109* 90 85   Estimated Creatinine Clearance: 77.6 mL/min (by C-G formula based on SCr of 0.8 mg/dL).  Results from last 7 days   Lab Units 01/02/23  0811 01/01/23  1136 12/31/22  0822 12/30/22  0643 12/29/22  1204   CALCIUM mg/dL 9.0 8.8 8.5* 8.5* 9.4   ALBUMIN g/dL  --   --   --  3.4* 4.1     Results from last 7 days   Lab Units 12/30/22  0643 12/29/22  1204   ALBUMIN g/dL 3.4* 4.1   BILIRUBIN mg/dL 0.8 0.9   ALK PHOS U/L 71 86   AST (SGOT) U/L 16 27   ALT (SGPT) U/L 10 19       Assessment:  Acute on chronic hypoxic respiratory failure/COPD/bronchiectasis : Suspect secondary to COPD exacerbation.  Patient on 2-3L via NC today; better than baseline.   Appreciate pulm seeing him. Antitussive added.   Acute on chronic CHF with preserved LVH: IV lasix for volume overload- improvement in O2. -2675 UOP yesterday.    Pulmonary infiltrate:   Normal procalcitonin.  No fever.  No leukocytosis.  Monitor off abx.   Hypokalemia: replace per protocol  Recent history of saddle pulmonary embolus: On admission Eliquis ordered as one-time dose and then stopped; will need to reload patient- 10mg BID x7 days started; discussed with patient today.  Chronic pain: Continue home medications.  ADD: Continue home medications.    Plan:  Please see above.    Elisa Tony MD  1/2/2023  16:29 EST    I spent 52 minutes in the care for this patient included but not limited to reviewing his chart (labs/vitals/new documentation), ordering labs, ordering medications, discussing with his primary RN, discussing with pharmacy and examining/discussing with the patient.

## 2023-01-02 NOTE — CASE MANAGEMENT/SOCIAL WORK
Continued Stay Note  [facility]     Patient Name: Tony Leonard  MRN: [id number]  Today's Date: 1/2/2023    Admit Date: 12/29/2022    [place]: [place] [facility] following   Discharge Plan     Row Name 01/02/23 1505       Plan    Plan [place] [facility] following    Patient/Family in Agreement with Plan yes    Plan Comments Spoke with patient at bedside. He does not feel he will need SNF.  He confirms [place] to [place] [facility] [place].  [facility] following.  CCP continues to follow.  Adán TAVARES                       Expected Discharge Date and Time     Expected Discharge Date Expected Discharge Time    Jan 2, 2023             Becky S. [place], RN

## 2023-01-02 NOTE — PROGRESS NOTES
"  PROGRESS NOTE  Patient Name: Tony Leonard  Age/Sex: 82 y.o. male  : 1940  MRN: [id number]    Date of Admission: 2022  Date of Encounter Visit: 23   LOS: 3 days   Patient Care Team:  Erich Aviles MD as PCP - General (Internal Medicine)  Katharina Salter MD as Consulting Physician (Pulmonary Disease)  Anum Bhatt MD as Consulting Physician (Pain Medicine)  Azeb Cook RN as Ambulatory  ([facility])  Otilia Shepard MSW as  (Amb Case Mgmt) ([facility])    Chief Complaint: Acute on chronic respiratory failure, COPD, recent pulmonary embolus, edema    Hospital course: Patient is doing much better, he is on 3 L/min nasal cannula oxygen.  He is complaining of the cough however there is not much copious secretions.  And the lung exam does not show any significant secretion retention to consider potential benefit from bronchoscopy.        REVIEW OF SYSTEMS:   CONSTITUTIONAL: no fever or chills  CARDIOVASCULAR: No chest pain, chest pressure or chest discomfort. No palpitations, positive edema, improving.   RESPIRATORY:  positive cough with airway irritation but not much copious amount  GASTROINTESTINAL: No anorexia, nausea, vomiting or diarrhea. No abdominal pain or blood.   HEMATOLOGIC: No bleeding or bruising.     Ventilator/Non-Invasive Ventilation Settings (From admission, onward)    3 L/min            Vital Signs  Temp:  [97.3 °F (36.3 °C)-97.9 °F (36.6 °C)] 97.9 °F (36.6 °C)  Heart Rate:  [] 96  Resp:  [18-20] 18  BP: (113-130)/(63-71) 113/71  SpO2:  [90 %-94 %] 92 %  on  Flow (L/min):  [2-3] 3 Device (Oxygen Therapy): nasal cannula    Intake/Output Summary (Last 24 hours) at 2023 1434  Last data filed at 2023 1252  Gross per 24 hour   Intake 370 ml   Output 1925 ml   Net -1555 ml     Flowsheet Rows    Flowsheet Row First Filed Value   Admission Height 180.3 cm (71\") Documented at 2022 1136   Admission Weight 79.8 kg (176 " lb) Documented at 12/29/2022 1136        Body mass index is 24.55 kg/m².      12/29/22  1136   Weight: 79.8 kg (176 lb)       Physical Exam:  GEN:  No acute distress, alert, cooperative, well developed   EYES:   Sclerae clear. No icterus. PERRL. Normal EOM  ENT:   External ears/nose normal, no oral lesions, no thrush, mucous membranes moist  NECK:  Supple, midline trachea, no JVD  LUNGS: Normal chest on inspection, minimal rhonchi, no crackles. Respirations regular, even and unlabored.   CV:  Regular rhythm and rate. Normal S1/S2. No murmurs, gallops, or rubs noted.  ABD:  Soft, nontender and nondistended. Normal bowel sounds. No guarding  EXT:  Moves all extremities well. No cyanosis. No redness.  Positive minimal residual lower extremity edema but improving  Skin: Dry, intact, no bleeding    Results Review:    Results From Last 14 Days   Lab Units 12/29/22  1204   LACTATE mmol/L 1.4     Results from last 7 days   Lab Units 01/02/23  0811 01/01/23  2358 01/01/23  1136 12/31/22  0822 12/30/22  0643 12/29/22  1204   SODIUM mmol/L 137  --  135* 139 139 136   POTASSIUM mmol/L 4.2 4.5 3.4* 3.0* 3.5 4.2   CHLORIDE mmol/L 98  --  96* 98 101 102   CO2 mmol/L 27.5  --  29.0 29.4* 26.9 25.1   BUN mg/dL 29*  --  33* 27* 16 11   CREATININE mg/dL 0.80  --  0.96 1.21 1.18 0.96   CALCIUM mg/dL 9.0  --  8.8 8.5* 8.5* 9.4   AST (SGOT) U/L  --   --   --   --  16 27   ALT (SGPT) U/L  --   --   --   --  10 19   ANION GAP mmol/L 11.5  --  10.0 11.6 11.1 8.9   ALBUMIN g/dL  --   --   --   --  3.4* 4.1     Results from last 7 days   Lab Units 12/29/22  1204   TROPONIN T ng/mL 0.012         Results from last 7 days   Lab Units 12/29/22  1204   PROBNP pg/mL 557.0     Results from last 7 days   Lab Units 01/02/23  0811 01/01/23  1136 12/31/22  0823 12/30/22  0643 12/29/22  1204   WBC 10*3/mm3 12.38* 9.31 9.36 9.09 8.63   HEMOGLOBIN g/dL 12.1* 12.2* 12.1* 11.8* 13.2   HEMATOCRIT % 36.0* 35.7* 34.9* 33.8* 38.6   PLATELETS 10*3/mm3 259 250 238  225 233   MCV fL 95.5 94.7 91.6 91.8 95.5   NEUTROPHIL % % 57.2 59.0 59.4 53.4 58.3   LYMPHOCYTE % % 18.6* 17.0* 16.5* 15.8* 16.2*   MONOCYTES % % 9.8 12.2* 12.9* 15.6* 11.2   EOSINOPHIL % % 13.7* 11.1* 10.6* 14.1* 13.3*   BASOPHIL % % 0.3 0.4 0.4 0.9 0.9   IMM GRAN % % 0.4 0.3 0.2 0.2 0.1                   Invalid input(s): LDLCALC          No results found for: POCGLU  Results from last 7 days   Lab Units 12/31/22  0822 12/29/22  1204   PROCALCITONIN ng/mL 0.16 0.12   LACTATE mmol/L  --  1.4     Results from last 7 days   Lab Units 12/29/22  1204   BLOODCX  No growth at 4 days  No growth at 4 days         Results from last 7 days   Lab Units 12/31/22  1142   COVID19  Not Detected   ADENOVIRUS DETECTION BY PCR  Not Detected   CORONAVIRUS 229E  Not Detected   CORONAVIRUS HKU1  Not Detected   CORONAVIRUS NL63  Not Detected   CORONAVIRUS OC43  Not Detected   HUMAN METAPNEUMOVIRUS  Not Detected   HUMAN RHINOVIRUS/ENTEROVIRUS  Detected*   INFLUENZA B PCR  Not Detected   PARAINFLUENZA 1  Not Detected   PARAINFLUENZA VIRUS 2  Not Detected   PARAINFLUENZA VIRUS 3  Not Detected   PARAINFLUENZA VIRUS 4  Not Detected   BORDETELLA PERTUSSIS PCR  Not Detected   BORDETELLA PARAPERTUSSIS PCR  Not Detected   CHLAMYDOPHILA PNEUMONIAE PCR  Not Detected   MYCOPLAMA PNEUMO PCR  Not Detected   RSV, PCR  Not Detected               Imaging:   Imaging Results (All)     Procedure Component Value Units Date/Time    XR Chest 1 View [[id number]] Collected: 12/29/22 1232     Updated: 12/29/22 1237    Narrative:      XR CHEST 1 VW-     HISTORY: Male who is 82 years-old,  short of breath     TECHNIQUE: Frontal view of the chest     COMPARISON: 12 /9/2022     FINDINGS: Heart, mediastinum and pulmonary vasculature are unremarkable.  Hazy opacity at the right apex may represent infiltrate, follow-up  recommended. No pleural effusion, or pneumothorax. No acute osseous  process.       Impression:      Hazy opacity at the right apex, follow-up  recommended.     This report was finalized on 12/29/2022 12:34 PM by Dr. Tj Evans M.D.             I reviewed the patient's new clinical results.  I personally viewed and interpreted the patient's imaging results:        Medication Review:   acetylcysteine, 3 mL, Nebulization, BID - RT  albuterol, 2.5 mg, Nebulization, Q4H - RT  apixaban, 10 mg, Oral, Q12H   Followed by  [START ON 1/9/2023] apixaban, 5 mg, Oral, Q12H  azithromycin, 250 mg, Oral, Once per day on Mon Wed Fri  budesonide, 0.5 mg, Nebulization, BID - RT  furosemide, 80 mg, Intravenous, BID  gabapentin, 300 mg, Oral, TID  guaiFENesin, 600 mg, Oral, Q12H  methylphenidate, 10 mg, Oral, Daily  pantoprazole, 40 mg, Oral, Daily  potassium chloride, 20 mEq, Oral, Daily  rOPINIRole, 0.5 mg, Oral, Nightly  sodium chloride, 10 mL, Intravenous, Q12H  sodium chloride, 4 mL, Nebulization, BID - RT             ASSESSMENT:   1. Acute on chronic hypoxic respiratory failure  2. Infiltrate of right upper lobe  3. 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 History of recurrent mucous plugging  5. Home oxygen therapy, 4 L/min  6. Recent saddle pulmonary emboli on chronic anticoagulation (Eliquis)  7. GERD  8. Spinal stenosis    PLAN:  Continue with the 3 times a week Zithromax as part of his home regimen  Continue with the Mucinex  We will add an antitussive medication to help sleep at night  Add Flonase nasal spray    We will continue to follow    Discussed with patient    Disposition: Home    Katharina Salter MD  01/02/23  14:34 EST           Dictated utilizing Dragon dictation

## 2023-01-03 LAB
ANION GAP SERPL CALCULATED.3IONS-SCNC: 12.4 MMOL/L (ref 5–15)
BACTERIA SPEC AEROBE CULT: NORMAL
BACTERIA SPEC AEROBE CULT: NORMAL
BASOPHILS # BLD AUTO: 0.06 10*3/MM3 (ref 0–0.2)
BASOPHILS NFR BLD AUTO: 0.7 % (ref 0–1.5)
BUN SERPL-MCNC: 29 MG/DL (ref 8–23)
BUN/CREAT SERPL: 32.6 (ref 7–25)
CALCIUM SPEC-SCNC: 8.6 MG/DL (ref 8.6–10.5)
CHLORIDE SERPL-SCNC: 95 MMOL/L (ref 98–107)
CO2 SERPL-SCNC: 28.6 MMOL/L (ref 22–29)
CREAT SERPL-MCNC: 0.89 MG/DL (ref 0.76–1.27)
DEPRECATED RDW RBC AUTO: 45.6 FL (ref 37–54)
EGFRCR SERPLBLD CKD-EPI 2021: 84.5 ML/MIN/1.73
EOSINOPHIL # BLD AUTO: 1.47 10*3/MM3 (ref 0–0.4)
EOSINOPHIL NFR BLD AUTO: 16.3 % (ref 0.3–6.2)
ERYTHROCYTE [DISTWIDTH] IN BLOOD BY AUTOMATED COUNT: 12.8 % (ref 12.3–15.4)
GLUCOSE SERPL-MCNC: 107 MG/DL (ref 65–99)
HCT VFR BLD AUTO: 36.8 % (ref 37.5–51)
HGB BLD-MCNC: 12.2 G/DL (ref 13–17.7)
IMM GRANULOCYTES # BLD AUTO: 0.02 10*3/MM3 (ref 0–0.05)
IMM GRANULOCYTES NFR BLD AUTO: 0.2 % (ref 0–0.5)
LYMPHOCYTES # BLD AUTO: 1.98 10*3/MM3 (ref 0.7–3.1)
LYMPHOCYTES NFR BLD AUTO: 21.9 % (ref 19.6–45.3)
MCH RBC QN AUTO: 32.3 PG (ref 26.6–33)
MCHC RBC AUTO-ENTMCNC: 33.2 G/DL (ref 31.5–35.7)
MCV RBC AUTO: 97.4 FL (ref 79–97)
MONOCYTES # BLD AUTO: 0.97 10*3/MM3 (ref 0.1–0.9)
MONOCYTES NFR BLD AUTO: 10.7 % (ref 5–12)
NEUTROPHILS NFR BLD AUTO: 4.53 10*3/MM3 (ref 1.7–7)
NEUTROPHILS NFR BLD AUTO: 50.2 % (ref 42.7–76)
NRBC BLD AUTO-RTO: 0 /100 WBC (ref 0–0.2)
PLATELET # BLD AUTO: 293 10*3/MM3 (ref 140–450)
PMV BLD AUTO: 9.2 FL (ref 6–12)
POTASSIUM SERPL-SCNC: 3.5 MMOL/L (ref 3.5–5.2)
POTASSIUM SERPL-SCNC: 4.4 MMOL/L (ref 3.5–5.2)
RBC # BLD AUTO: 3.78 10*6/MM3 (ref 4.14–5.8)
SODIUM SERPL-SCNC: 136 MMOL/L (ref 136–145)
WBC NRBC COR # BLD: 9.03 10*3/MM3 (ref 3.4–10.8)

## 2023-01-03 PROCEDURE — 80048 BASIC METABOLIC PNL TOTAL CA: CPT | Performed by: STUDENT IN AN ORGANIZED HEALTH CARE EDUCATION/TRAINING PROGRAM

## 2023-01-03 PROCEDURE — 97530 THERAPEUTIC ACTIVITIES: CPT

## 2023-01-03 PROCEDURE — 94799 UNLISTED PULMONARY SVC/PX: CPT

## 2023-01-03 PROCEDURE — 94668 MNPJ CHEST WALL SBSQ: CPT

## 2023-01-03 PROCEDURE — 94669 MECHANICAL CHEST WALL OSCILL: CPT

## 2023-01-03 PROCEDURE — 85025 COMPLETE CBC W/AUTO DIFF WBC: CPT | Performed by: STUDENT IN AN ORGANIZED HEALTH CARE EDUCATION/TRAINING PROGRAM

## 2023-01-03 PROCEDURE — 94664 DEMO&/EVAL PT USE INHALER: CPT

## 2023-01-03 PROCEDURE — 84132 ASSAY OF SERUM POTASSIUM: CPT | Performed by: STUDENT IN AN ORGANIZED HEALTH CARE EDUCATION/TRAINING PROGRAM

## 2023-01-03 PROCEDURE — 94761 N-INVAS EAR/PLS OXIMETRY MLT: CPT

## 2023-01-03 PROCEDURE — 25010000002 FUROSEMIDE PER 20 MG: Performed by: INTERNAL MEDICINE

## 2023-01-03 RX ORDER — METAXALONE 800 MG/1
800 TABLET ORAL 3 TIMES DAILY PRN
Status: DISCONTINUED | OUTPATIENT
Start: 2023-01-03 | End: 2023-01-06 | Stop reason: HOSPADM

## 2023-01-03 RX ADMIN — ALBUTEROL SULFATE 2.5 MG: 2.5 SOLUTION RESPIRATORY (INHALATION) at 07:21

## 2023-01-03 RX ADMIN — GUAIFENESIN 600 MG: 600 TABLET, EXTENDED RELEASE ORAL at 20:02

## 2023-01-03 RX ADMIN — HYDROCODONE POLISTIREX AND CHLORPHENIRAMINE POLISTIREX 2.5 ML: 10; 8 SUSPENSION, EXTENDED RELEASE ORAL at 21:29

## 2023-01-03 RX ADMIN — POTASSIUM CHLORIDE 40 MEQ: 750 TABLET, EXTENDED RELEASE ORAL at 08:33

## 2023-01-03 RX ADMIN — GABAPENTIN 300 MG: 300 CAPSULE ORAL at 15:33

## 2023-01-03 RX ADMIN — GABAPENTIN 300 MG: 300 CAPSULE ORAL at 08:32

## 2023-01-03 RX ADMIN — ACETYLCYSTEINE 3 ML: 200 SOLUTION ORAL; RESPIRATORY (INHALATION) at 23:45

## 2023-01-03 RX ADMIN — BUDESONIDE 0.5 MG: 0.5 INHALANT ORAL at 07:23

## 2023-01-03 RX ADMIN — Medication 10 ML: at 08:34

## 2023-01-03 RX ADMIN — BUDESONIDE 0.5 MG: 0.5 INHALANT ORAL at 19:20

## 2023-01-03 RX ADMIN — POTASSIUM CHLORIDE 40 MEQ: 750 TABLET, EXTENDED RELEASE ORAL at 12:46

## 2023-01-03 RX ADMIN — Medication 10 ML: at 20:03

## 2023-01-03 RX ADMIN — SODIUM CHLORIDE SOLN NEBU 7% 4 ML: 7 NEBU SOLN at 07:26

## 2023-01-03 RX ADMIN — ALBUTEROL SULFATE 2.5 MG: 2.5 SOLUTION RESPIRATORY (INHALATION) at 03:44

## 2023-01-03 RX ADMIN — ALBUTEROL SULFATE 2.5 MG: 2.5 SOLUTION RESPIRATORY (INHALATION) at 19:19

## 2023-01-03 RX ADMIN — SODIUM CHLORIDE SOLN NEBU 7% 4 ML: 7 NEBU SOLN at 19:28

## 2023-01-03 RX ADMIN — METAXALONE 800 MG: 800 TABLET ORAL at 15:33

## 2023-01-03 RX ADMIN — GABAPENTIN 300 MG: 300 CAPSULE ORAL at 20:02

## 2023-01-03 RX ADMIN — APIXABAN 10 MG: 5 TABLET, FILM COATED ORAL at 20:02

## 2023-01-03 RX ADMIN — APIXABAN 10 MG: 5 TABLET, FILM COATED ORAL at 08:32

## 2023-01-03 RX ADMIN — ROPINIROLE HYDROCHLORIDE 0.5 MG: 0.5 TABLET, FILM COATED ORAL at 20:02

## 2023-01-03 RX ADMIN — METHYLPHENIDATE HYDROCHLORIDE 10 MG: 10 TABLET ORAL at 10:17

## 2023-01-03 RX ADMIN — PANTOPRAZOLE SODIUM 40 MG: 40 TABLET, DELAYED RELEASE ORAL at 08:32

## 2023-01-03 RX ADMIN — AZELASTINE HYDROCHLORIDE 2 SPRAY: 137 SPRAY, METERED NASAL at 20:03

## 2023-01-03 RX ADMIN — ALBUTEROL SULFATE 2.5 MG: 2.5 SOLUTION RESPIRATORY (INHALATION) at 11:00

## 2023-01-03 RX ADMIN — GUAIFENESIN 600 MG: 600 TABLET, EXTENDED RELEASE ORAL at 08:32

## 2023-01-03 RX ADMIN — ALBUTEROL SULFATE 2.5 MG: 2.5 SOLUTION RESPIRATORY (INHALATION) at 15:17

## 2023-01-03 RX ADMIN — AZELASTINE HYDROCHLORIDE 2 SPRAY: 137 SPRAY, METERED NASAL at 08:34

## 2023-01-03 RX ADMIN — POTASSIUM CHLORIDE 20 MEQ: 750 TABLET, EXTENDED RELEASE ORAL at 08:33

## 2023-01-03 RX ADMIN — ACETYLCYSTEINE 3 ML: 200 SOLUTION ORAL; RESPIRATORY (INHALATION) at 11:06

## 2023-01-03 RX ADMIN — FUROSEMIDE 80 MG: 10 INJECTION, SOLUTION INTRAMUSCULAR; INTRAVENOUS at 08:32

## 2023-01-03 RX ADMIN — FLUTICASONE PROPIONATE 2 SPRAY: 50 SPRAY, METERED NASAL at 08:34

## 2023-01-03 RX ADMIN — ALBUTEROL SULFATE 2.5 MG: 2.5 SOLUTION RESPIRATORY (INHALATION) at 23:44

## 2023-01-03 RX ADMIN — ACETAMINOPHEN 650 MG: 325 TABLET, FILM COATED ORAL at 06:01

## 2023-01-03 NOTE — THERAPY TREATMENT NOTE
Patient Name: Tony Leonard  : 1940    MRN: [id number]                              Today's Date: 1/3/2023       Admit Date: 2022    Visit Dx:     ICD-10-CM ICD-9-CM   1. Pneumonia of right upper lobe due to infectious organism  J18.9 486   2. Other acute pulmonary embolism without acute cor pulmonale ([facility])  I26.99 415.19   3. Acute on chronic congestive heart failure, unspecified heart failure type ([facility])  I50.9 428.0   4. COPD exacerbation ([facility])  J44.1 491.21     Patient Active Problem List   Diagnosis   • Thrush, oral   • ADD (attention deficit disorder)   • Hemorrhoids   • Bronchiectasis with acute lower respiratory infection ([facility])   • Diverticul disease small and large intestine, no perforati or abscess   • Benign non-nodular prostatic hyperplasia without lower urinary tract symptoms   • Gastroesophageal reflux disease   • Malaise and fatigue   • Environmental allergies   • Hemoptysis   • Dyslipidemia   • Primary osteoarthritis involving multiple joints   • Pneumonia of right lower lobe due to infectious organism   • Abnormal EKG   • COPD (chronic obstructive pulmonary disease) ([facility])   • Mild malnutrition ([facility])   • Acute on chronic respiratory failure with hypoxia ([facility])   • Fracture, intertrochanteric, right femur, closed, initial encounter ([facility])   • Chronic diastolic CHF (congestive heart failure) ([facility])   • Hematoma of frontal scalp   • Postoperative anemia due to acute blood loss   • Urinary retention   • Hemorrhagic disorder due to circulating anticoagulants ([facility])   • History of fracture of right hip   • Closed fracture of right hip with routine healing   • Chronic low back pain with sciatica   • Change in bowel function   • Rectal bleeding   • Prostate cancer ([facility])   • On home oxygen therapy   • Acute viral bronchitis   • Chronic diastolic (congestive) heart failure ([facility])   • Peripheral neuropathy   • Plantar fasciitis   • COPD exacerbation ([facility])   • Bilateral lower extremity edema   •  Microscopic hematuria   • Acute midline low back pain with right-sided sciatica   • Spinal stenosis, lumbar region with neurogenic claudication   • Compression deformity of vertebra   • Rectal bleed   • Esophagitis   • Angiectasia   • Hiatal hernia   • Overweight (BMI 25.0-29.9)   • Leukocytosis   • Bilateral hip pain   • Elevated troponin level not due myocardial infarction   • Nocturnal hypoxia   • Acute saddle pulmonary embolism without acute cor pulmonale ([facility]) - 12/11/2022   • Pneumonia of right upper lobe due to infectious organism     Past Medical History:   Diagnosis Date   • ADHD (attention deficit hyperactivity disorder)    • Bronchiectasis ([facility])    • CHF (congestive heart failure) ([facility])    • Colon polyp    • COPD (chronic obstructive pulmonary disease) ([facility])    • Diverticulosis    • Hard of hearing    • On home oxygen therapy     2 LITERS   • Pneumonia    • Prostate cancer ([facility]) 04/22/2019   • Spinal stenosis, lumbar region with neurogenic claudication 08/02/2022     Past Surgical History:   Procedure Laterality Date   • BRONCHOSCOPY N/A 4/30/2016    Procedure: BRONCHOSCOPY with BAL of right lower lobe and left  lower lobe.  ;  Surgeon: Nando Diaz MD;  Location: [facility];  Service:    • BRONCHOSCOPY N/A 4/28/2017    Procedure: BRONCHOSCOPY;  Surgeon: Katharina Salter MD;  Location: [facility] ENDOSCOPY;  Service:    • BRONCHOSCOPY Bilateral 6/29/2017    Procedure: BRONCHOSCOPY WITH WASHINGS;  Surgeon: Katharina Salter MD;  Location: [facility] ENDOSCOPY;  Service:    • BRONCHOSCOPY N/A 1/22/2018    Procedure: BRONCHOSCOPY AT BEDSIDE with BAL;  Surgeon: Katharina Salter MD;  Location: [facility] ENDOSCOPY;  Service:    • BRONCHOSCOPY N/A 1/30/2018    Procedure: BRONCHOSCOPY with BAL and washing;  Surgeon: Shreyas Wild MD;  Location: [facility] ENDOSCOPY;  Service:    • BRONCHOSCOPY Bilateral 4/24/2018    Procedure: BRONCHOSCOPY WITH WASHING;  Surgeon: Katharina Salter MD;  Location: [facility] ENDOSCOPY;  Service:  Pulmonary   • BRONCHOSCOPY N/A 12/28/2019    Procedure: BRONCHOSCOPY with bilateral washing;  Surgeon: Katharina Salter MD;  Location[facility];  Service: Pulmonary   • COLONOSCOPY  05/17/2013    eh, ih, tort, sig tics   • COLONOSCOPY N/A 5/10/2019    Non-thrombosed external hemorrhoids found on perianal exam, Diverticulosis, Tortuous colon, One 5 mm polyp in the mid ascending colon, IH. Path: Tubular adenoma.    • COLONOSCOPY N/A 9/24/2022    Procedure: COLONOSCOPY to cecum and TI with argon plasma coagulation.;  Surgeon: Bruce Black MD;  Location[facility] END[facility];  Service: Gastroenterology;  Laterality: N/A;  pre- GI bleeding  post- radiation proctitis, diverticulosis   • ENDOSCOPY  03/19/2015    z line irreg, hh   • ENDOSCOPY N/A 9/24/2022    Procedure: ESOPHAGOGASTRODUODENOSCOPY;  Surgeon: Bruce Black MD;  Location[facility] END[facility];  Service: Gastroenterology;  Laterality: N/A;  pre- GI bleeding  post- esophagitis, hiatal hernia   • HIP OPEN REDUCTION Right 1/17/2018    Procedure: HIP OPEN REDUCTION INTERNAL FIXATION WITH DYNAMIC HIP SCREW;  Surgeon: Les Black MD;  Location: [facility] MAIN OR;  Service:    • SPINE SURGERY     • TONSILLECTOMY     • TOTAL HIP ARTHROPLASTY REVISION Right 9/23/2019    Procedure: HIP  REVISION RIGHT;  Surgeon: Isauro Warner MD;  Location: [facility] MAIN OR;  Service: Orthopedics      General Information     Row Name 01/03/23 1629          Physical Therapy Time and Intention    Document Type therapy note (daily note)  -CS     Mode of Treatment individual therapy;physical therapy  -CS     Row Name 01/03/23 1628          General Information    Patient Profile Reviewed yes  -CS     Existing Precautions/Restrictions fall;oxygen therapy device and L/min  -CS     Row Name 01/03/23 1628          Cognition    Orientation Status (Cognition) oriented x 3  -CS     Row Name 01/03/23 1628          Safety Issues, Functional Mobility    Impairments Affecting Function  (Mobility) balance;endurance/activity tolerance;strength;shortness of breath;postural/trunk control  -CS           User Key  (r) = Recorded By, (t) = Taken By, (c) = Cosigned By    Initials Name Provider Type    Destiny Silverman PT Physical Therapist               Mobility     Row Name 01/03/23 1628          Bed Mobility    Bed Mobility supine-sit;sit-supine  -CS     Supine-Sit [place] (Bed Mobility) standby assist  -CS     Sit-Supine [place] (Bed Mobility) minimum assist (75% patient effort)  -CS     Assistive Device (Bed Mobility) bed rails;head of bed elevated  -CS     Comment, (Bed Mobility) increased time to reach sitting EOB  -CS     Row Name 01/03/23 1628          Sit-Stand Transfer    Sit-Stand [place] (Transfers) minimum assist (75% patient effort);verbal cues  -CS     Assistive Device (Sit-Stand Transfers) walker, front-wheeled  -CS     Comment, (Sit-Stand Transfer) strong forward flexed posture  -CS     Row Name 01/03/23 1628          Gait/Stairs (Locomotion)    [place] Level (Gait) contact guard;verbal cues  -CS     Assistive Device (Gait) walker, front-wheeled  -CS     Distance in Feet (Gait) 50'  -CS     Deviations/Abnormal Patterns (Gait) norm decreased;gait speed decreased;stride length decreased  -CS     Bilateral Gait Deviations forward flexed posture;heel strike decreased  -CS     Comment, (Gait/Stairs) no overt LOB; cues for RW management for safety  -CS           User Key  (r) = Recorded By, (t) = Taken By, (c) = Cosigned By    Initials Name Provider Type    Destiny Silverman PT Physical Therapist               Obj/Interventions     Row Name 01/03/23 1630          Motor Skills    Therapeutic Exercise other (see comments)  10 reps B LE AP & LAQ  -     Row Name 01/03/23 1630          Balance    Balance Assessment sitting static balance;sitting dynamic balance;standing static balance;standing dynamic balance  -CS     Static Sitting Balance standby assist  -CS      Dynamic Sitting Balance standby assist  -CS     Position, Sitting Balance unsupported;sitting edge of bed  -CS     Static Standing Balance contact guard  -CS     Dynamic Standing Balance contact guard  -CS     Position/Device Used, Standing Balance supported;walker, front-wheeled  -CS           User Key  (r) = Recorded By, (t) = Taken By, (c) = Cosigned By    Initials Name Provider Type    CS Destiny Santiago, PT Physical Therapist               Goals/Plan    No documentation.                Clinical Impression     Row Name 01/03/23 1630          Pain    Pretreatment Pain Rating 0/10 - no pain  -CS     Posttreatment Pain Rating 0/10 - no pain  -CS     Row Name 01/03/23 1630          Plan of Care Review    Plan of Care Reviewed With patient  -CS     Progress improving  -CS     Outcome Evaluation Pt received in bed upon arrival and agreeable to PT. Pt required SBA and increased time to perform to reach sitting EOB. Pt stood requiring min A presenting with a strong forward flexed posture with cues provided for upright posture. Pt ambulated 50'c  RW requiring CGA. Pt demonstrates a slow pace but no overt LOB. Pt required some assist to manage RW for safety. Pt returned to supine requiring min A for LE. Pt with visable SOA with activity requiring 3L. Nsg requested pt increase O2 demands while ambulating next visit. Pt will continue to benefit from skilled PT to address strength, endurance, and functional mobility.  -     Row Name 01/03/23 1630          Therapy Assessment/Plan (PT)    Criteria for Skilled Interventions Met (PT) yes;meets criteria  -CS     Therapy Frequency (PT) 6 times/wk  -     Row Name 01/03/23 1630          Positioning and Restraints    Pre-Treatment Position in bed  -CS     Post Treatment Position bed  -CS     In Bed fowlers;call light within reach;encouraged to call for assist;with nsg  bed alarm not working; nsg aware  -CS           User Key  (r) = Recorded By, (t) = Taken By, (c) = Cosigned By     Initials Name Provider Type    CS Destiny Santiago, STEFF Physical Therapist               Outcome Measures     Row Name 01/03/23 1635          How much help from another person do you currently need...    Turning from your back to your side while in flat bed without using bedrails? 3  -CS     Moving from lying on back to sitting on the side of a flat bed without bedrails? 3  -CS     Moving to and from a bed to a chair (including a wheelchair)? 3  -CS     Standing up from a chair using your arms (e.g., wheelchair, bedside chair)? 3  -CS     Climbing 3-5 steps with a railing? 2  -CS     To walk in hospital room? 3  -CS     AM-PAC 6 Clicks Score (PT) 17  -CS     Highest level of mobility 5 --> Static standing  -CS     Row Name 01/03/23 1635          Functional Assessment    Outcome Measure Options AM-PAC 6 Clicks Basic Mobility (PT)  -CS           User Key  (r) = Recorded By, (t) = Taken By, (c) = Cosigned By    Initials Name Provider Type    CS Destiny Santiago PT Physical Therapist                             Physical Therapy Education     Title: PT OT SLP Therapies (In Progress)     Topic: Physical Therapy (Done)     Point: Mobility training (Done)     Learning Progress Summary           Patient Acceptance, E,TB, VU,DU,NR by CS at 1/3/2023 1635    Acceptance, E,D, NR by  at 12/31/2022 1509    Acceptance, E, VU,NR by  at 12/30/2022 1634                   Point: Home exercise program (Done)     Learning Progress Summary           Patient Acceptance, E,TB, VU,DU,NR by CS at 1/3/2023 1635    Acceptance, E, VU,NR by  at 12/30/2022 1634                   Point: Body mechanics (Done)     Learning Progress Summary           Patient Acceptance, E,TB, VU,DU,NR by CS at 1/3/2023 1635    Acceptance, E,D, NR by PH at 12/31/2022 1509    Acceptance, E, VU,NR by  at 12/30/2022 1634                   Point: Precautions (Done)     Learning Progress Summary           Patient Acceptance, E,TB, VU,DU,NR by CS at 1/3/2023 1635     Acceptance, E,D, NR by  at 12/31/2022 1509    Acceptance, E, VU,NR by  at 12/30/2022 1634                               User Key     Initials Effective Dates Name Provider Type Discipline     06/16/21 -  Ann Serna, PT Physical Therapist PT     06/16/21 -  Terri Hubbard PTA Physical Therapist Assistant PT     09/22/22 -  Destiny Santiago PT Physical Therapist PT              PT Recommendation and Plan     Plan of Care Reviewed With: patient  Progress: improving  Outcome Evaluation: Pt received in bed upon arrival and agreeable to PT. Pt required SBA and increased time to perform to reach sitting EOB. Pt stood requiring min A presenting with a strong forward flexed posture with cues provided for upright posture. Pt ambulated 50'c  RW requiring CGA. Pt demonstrates a slow pace but no overt LOB. Pt required some assist to manage RW for safety. Pt returned to supine requiring min A for LE. Pt with visable SOA with activity requiring 3L. Nsg requested pt increase O2 demands while ambulating next visit. Pt will continue to benefit from skilled PT to address strength, endurance, and functional mobility.     Time Calculation:    PT Charges     Row Name 01/03/23 1636             Time Calculation    Start Time 1530  -CS      Stop Time 1545  -CS      Time Calculation (min) 15 min  -CS      PT Received On 01/03/23  -      PT - Next Appointment 01/04/23  -         Time Calculation- PT    Total Timed Code Minutes- PT 14 minute(s)  -CS         Timed Charges    94198 - PT Therapeutic Activity Minutes 14  -CS         Total Minutes    Timed Charges Total Minutes 14  -CS       Total Minutes 14  -CS            User Key  (r) = Recorded By, (t) = Taken By, (c) = Cosigned By    Initials Name Provider Type    CS Destiny Santiago, PT Physical Therapist              Therapy Charges for Today     Code Description Service Date Service Provider Modifiers Qty    [id number] HC PT THERAPEUTIC ACT EA 15 MIN  1/3/2023 Destiny Santiago, PT GP 1          PT G-Codes  Outcome Measure Options: AM-PAC 6 Clicks Basic Mobility (PT)  AM-PAC 6 Clicks Score (PT): 17  AM-PAC 6 Clicks Score (OT): 17  PT Discharge Summary  Anticipated Discharge Disposition (PT): skilled nursing facility    Destiny Santiago PT  1/3/2023

## 2023-01-03 NOTE — PLAN OF CARE
Goal Outcome Evaluation:  Plan of Care Reviewed With: patient        Progress: improving  Outcome Evaluation: Pt received in bed upon arrival and agreeable to PT. Pt required SBA and increased time to perform to reach sitting EOB. Pt stood requiring min A presenting with a strong forward flexed posture with cues provided for upright posture. Pt ambulated 50'c  RW requiring CGA. Pt demonstrates a slow pace but no overt LOB. Pt required some assist to manage RW for safety. Pt returned to supine requiring min A for LE. Pt with visable SOA with activity requiring 3L. Nsg requested pt increase O2 demands while ambulating next visit. Pt will continue to benefit from skilled PT to address strength, endurance, and functional mobility.

## 2023-01-03 NOTE — PROGRESS NOTES
"DAILY PROGRESS NOTE  [facility]    Patient Identification:  Name: Tony Leonard  Age: 82 y.o.  Sex: male  :  1940  MRN: [id number]         Primary Care Physician: Erich Aviles MD      Subjective  Patient on 3L via nasal cannula. He reports pulm is planning for a bronchoscopy tomorrow. His biggest complaint today is spasms in the backs of his legs and is asking for a muscle relaxer.     Objective:  General Appearance:  Comfortable and in no acute distress (Chronically ill-appearing.).    Vital signs: (most recent): Blood pressure 115/57, pulse 109, temperature 98 °F (36.7 °C), temperature source Oral, resp. rate 20, height 180.3 cm (71\"), weight 79.8 kg (176 lb), SpO2 93 %.    Lungs:  Normal effort and normal respiratory rate.  He is not in respiratory distress.  No stridor.  There are rhonchi (improving).  No rales, decreased breath sounds or wheezes.  (Rhonchi throughout. )  Heart: Normal rate.  Regular rhythm.  S1 normal.    Extremities: There is no dependent edema.    Neurological: Patient is alert and oriented to person, place and time.    Skin:  Warm and dry.            Vital signs in last 24 hours:  Temp:  [97.9 °F (36.6 °C)-99.4 °F (37.4 °C)] 98 °F (36.7 °C)  Heart Rate:  [] 109  Resp:  [18-20] 20  BP: ()/(57-81) 115/57    Intake/Output:    Intake/Output Summary (Last 24 hours) at 1/3/2023 1350  Last data filed at 1/3/2023 1000  Gross per 24 hour   Intake --   Output 2015 ml   Net -2015 ml         Results from last 7 days   Lab Units 23  0628 23  0811 23  1136 22  0823 22  0643 22  1204   WBC 10*3/mm3 9.03 12.38* 9.31 9.36 9.09 8.63   HEMOGLOBIN g/dL 12.2* 12.1* 12.2* 12.1* 11.8* 13.2   PLATELETS 10*3/mm3 293 259 250 238 225 233     Results from last 7 days   Lab Units 23  0628 23  0811 23  2358 23  1136 22  0822 22  0643 22  1204   SODIUM mmol/L 136 137  --  135* 139 139 136   POTASSIUM " mmol/L 3.5 4.2 4.5 3.4* 3.0* 3.5 4.2   CHLORIDE mmol/L 95* 98  --  96* 98 101 102   CO2 mmol/L 28.6 27.5  --  29.0 29.4* 26.9 25.1   BUN mg/dL 29* 29*  --  33* 27* 16 11   CREATININE mg/dL 0.89 0.80  --  0.96 1.21 1.18 0.96   GLUCOSE mg/dL 107* 110*  --  118* 109* 90 85   Estimated Creatinine Clearance: 69.7 mL/min (by C-G formula based on SCr of 0.89 mg/dL).  Results from last 7 days   Lab Units 01/03/23  0628 01/02/23  0811 01/01/23  1136 12/31/22  0822 12/30/22  0643 12/29/22  1204   CALCIUM mg/dL 8.6 9.0 8.8 8.5* 8.5* 9.4   ALBUMIN g/dL  --   --   --   --  3.4* 4.1     Results from last 7 days   Lab Units 12/30/22  0643 12/29/22  1204   ALBUMIN g/dL 3.4* 4.1   BILIRUBIN mg/dL 0.8 0.9   ALK PHOS U/L 71 86   AST (SGOT) U/L 16 27   ALT (SGPT) U/L 10 19       Assessment:  Acute on chronic hypoxic respiratory failure/COPD/bronchiectasis : Suspect secondary to COPD exacerbation.  Patient on 2-3L via NC today; better than baseline.   Appreciate pulm seeing him. Antitussive/mucolytics. Bronchoscopy planned for 1/4.  Acute on chronic CHF with preserved LVH: IV lasix for volume overload- improvement in O2. -2675 UOP yesterday.  Monitor kidney function on daily BMP. Cr 0.9 today.  Pulmonary infiltrate:   Normal procalcitonin.  No fever.  No leukocytosis.  Monitor off abx.   Hypokalemia: replace per protocol  Recent history of saddle pulmonary embolus: Eliquis.   Chronic pain: Continue home medications.  ADD: Continue home medications.    Plan:  Please see above.    Elisa Tony MD  1/3/2023  13:50 EST    I spent 52 minutes in the care for this patient included but not limited to reviewing his chart (labs/vitals/new documentation), ordering labs, ordering medications, discussing with his primary RN, and examining/discussing with the patient.

## 2023-01-03 NOTE — PROGRESS NOTES
PROGRESS NOTE  Patient Name: Tony Leonard  Age/Sex: 82 y.o. male  : 1940  MRN: [id number]    Date of Admission: 2022  Date of Encounter Visit: 23   LOS: 4 days   Patient Care Team:  Erich Aviles MD as PCP - General (Internal Medicine)  Katharina Salter MD as Consulting Physician (Pulmonary Disease)  Anum Bhatt MD as Consulting Physician (Pain Medicine)  Azeb Cook RN as Ambulatory  ([facility])  Otilia Shepard MSW as  (Amb Case Mgmt) ([facility])    Chief Complaint: Acute on chronic respiratory failure, COPD, recent pulmonary embolus, edema    Hospital course: Patient slept some better last night with the help of the antitussive medication but he is still coughing during the daytime, he does sound more congested today.  We were hoping to control his symptoms without having to do a bronchoscopy however given the refractory cough and the finding on exam today patient may benefit from exploration and retrieval of any thick retained secretion that is responsible for the refractory persistent cough        REVIEW OF SYSTEMS:   CONSTITUTIONAL: no fever or chills  CARDIOVASCULAR: No chest pain, chest pressure or chest discomfort. No palpitations, positive edema, improving.   RESPIRATORY:  positive cough with airway irritation, minimal productive secretions, thick  GASTROINTESTINAL: No anorexia, nausea, vomiting or diarrhea. No abdominal pain or blood.   HEMATOLOGIC: No bleeding or bruising.     Ventilator/Non-Invasive Ventilation Settings (From admission, onward)    3 L/min            Vital Signs  Temp:  [97.9 °F (36.6 °C)-99.4 °F (37.4 °C)] 98 °F (36.7 °C)  Heart Rate:  [] 99  Resp:  [18-20] 18  BP: ()/(57-81) 115/57  SpO2:  [91 %-94 %] 91 %  on  Flow (L/min):  [3] 3 Device (Oxygen Therapy): nasal cannula    Intake/Output Summary (Last 24 hours) at 1/3/2023 1021  Last data filed at 1/3/2023 0909  Gross per 24 hour   Intake 250 ml  "  Output 2315 ml   Net -2065 ml     Flowsheet Rows    Flowsheet Row First Filed Value   Admission Height 180.3 cm (71\") Documented at 12/29/2022 1136   Admission Weight 79.8 kg (176 lb) Documented at 12/29/2022 1136        Body mass index is 24.55 kg/m².      12/29/22  1136   Weight: 79.8 kg (176 lb)       Physical Exam:  GEN:  No acute distress, alert, cooperative, well developed   EYES:   Sclerae clear. No icterus. PERRL. Normal EOM  ENT:   External ears/nose normal, no oral lesions, no thrush, mucous membranes moist  NECK:  Supple, midline trachea, no JVD  LUNGS: Normal chest on inspection, patient has slightly more rhonchi, expiratory wheezes with some retained secretions.  No crackles . Respirations regular, even and unlabored.   CV:  Regular rhythm and rate. Normal S1/S2. No murmurs, gallops, or rubs noted.  ABD:  Soft, nontender and nondistended. Normal bowel sounds. No guarding  EXT:  Moves all extremities well. No cyanosis. No redness.  Positive minimal residual lower extremity edema but improving  Skin: Dry, intact, no bleeding    Results Review:    Results From Last 14 Days   Lab Units 12/29/22  1204   LACTATE mmol/L 1.4     Results from last 7 days   Lab Units 01/03/23  0628 01/02/23  0811 01/01/23  2358 01/01/23  1136 12/31/22  0822 12/30/22  0643 12/29/22  1204   SODIUM mmol/L 136 137  --  135* 139 139 136   POTASSIUM mmol/L 3.5 4.2 4.5 3.4* 3.0* 3.5 4.2   CHLORIDE mmol/L 95* 98  --  96* 98 101 102   CO2 mmol/L 28.6 27.5  --  29.0 29.4* 26.9 25.1   BUN mg/dL 29* 29*  --  33* 27* 16 11   CREATININE mg/dL 0.89 0.80  --  0.96 1.21 1.18 0.96   CALCIUM mg/dL 8.6 9.0  --  8.8 8.5* 8.5* 9.4   AST (SGOT) U/L  --   --   --   --   --  16 27   ALT (SGPT) U/L  --   --   --   --   --  10 19   ANION GAP mmol/L 12.4 11.5  --  10.0 11.6 11.1 8.9   ALBUMIN g/dL  --   --   --   --   --  3.4* 4.1     Results from last 7 days   Lab Units 12/29/22  1204   TROPONIN T ng/mL 0.012         Results from last 7 days   Lab Units " 12/29/22  1204   PROBNP pg/mL 557.0     Results from last 7 days   Lab Units 01/03/23  0628 01/02/23  0811 01/01/23  1136 12/31/22  0823 12/30/22  0643 12/29/22  1204   WBC 10*3/mm3 9.03 12.38* 9.31 9.36 9.09 8.63   HEMOGLOBIN g/dL 12.2* 12.1* 12.2* 12.1* 11.8* 13.2   HEMATOCRIT % 36.8* 36.0* 35.7* 34.9* 33.8* 38.6   PLATELETS 10*3/mm3 293 259 250 238 225 233   MCV fL 97.4* 95.5 94.7 91.6 91.8 95.5   NEUTROPHIL % % 50.2 57.2 59.0 59.4 53.4 58.3   LYMPHOCYTE % % 21.9 18.6* 17.0* 16.5* 15.8* 16.2*   MONOCYTES % % 10.7 9.8 12.2* 12.9* 15.6* 11.2   EOSINOPHIL % % 16.3* 13.7* 11.1* 10.6* 14.1* 13.3*   BASOPHIL % % 0.7 0.3 0.4 0.4 0.9 0.9   IMM GRAN % % 0.2 0.4 0.3 0.2 0.2 0.1                   Invalid input(s): LDLCALC          No results found for: POCGLU  Results from last 7 days   Lab Units 12/31/22  0822 12/29/22  1204   PROCALCITONIN ng/mL 0.16 0.12   LACTATE mmol/L  --  1.4     Results from last 7 days   Lab Units 01/02/23  0554 12/29/22  1204   BLOODCX   --  No growth at 4 days  No growth at 4 days   RESPCX  Scant growth (1+) The culture consists of normal respiratory cyndee. This is a preliminary report; final report to follow.  --          Results from last 7 days   Lab Units 12/31/22  1142   COVID19  Not Detected   ADENOVIRUS DETECTION BY PCR  Not Detected   CORONAVIRUS 229E  Not Detected   CORONAVIRUS HKU1  Not Detected   CORONAVIRUS NL63  Not Detected   CORONAVIRUS OC43  Not Detected   HUMAN METAPNEUMOVIRUS  Not Detected   HUMAN RHINOVIRUS/ENTEROVIRUS  Detected*   INFLUENZA B PCR  Not Detected   PARAINFLUENZA 1  Not Detected   PARAINFLUENZA VIRUS 2  Not Detected   PARAINFLUENZA VIRUS 3  Not Detected   PARAINFLUENZA VIRUS 4  Not Detected   BORDETELLA PERTUSSIS PCR  Not Detected   BORDETELLA PARAPERTUSSIS PCR  Not Detected   CHLAMYDOPHILA PNEUMONIAE PCR  Not Detected   MYCOPLAMA PNEUMO PCR  Not Detected   RSV, PCR  Not Detected               Imaging:   Imaging Results (All)     Procedure Component Value Units  Date/Time          I reviewed the patient's new clinical results.  I personally viewed and interpreted the patient's imaging results:        Medication Review:   acetylcysteine, 3 mL, Nebulization, BID - RT  albuterol, 2.5 mg, Nebulization, Q4H - RT  apixaban, 10 mg, Oral, Q12H   Followed by  [START ON 1/9/2023] apixaban, 5 mg, Oral, Q12H  azelastine, 2 spray, Each Nare, BID  azithromycin, 250 mg, Oral, Once per day on Mon Wed Fri  budesonide, 0.5 mg, Nebulization, BID - RT  fluticasone, 2 spray, Each Nare, Daily  furosemide, 80 mg, Intravenous, BID  gabapentin, 300 mg, Oral, TID  guaiFENesin, 600 mg, Oral, Q12H  methylphenidate, 10 mg, Oral, Daily  pantoprazole, 40 mg, Oral, Daily  potassium chloride, 20 mEq, Oral, Daily  rOPINIRole, 0.5 mg, Oral, Nightly  sodium chloride, 10 mL, Intravenous, Q12H  sodium chloride, 4 mL, Nebulization, BID - RT             ASSESSMENT:   1. Acute on chronic hypoxic respiratory failure  2. Infiltrate of right upper lobe  3. 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 History of recurrent mucous plugging  5. Home oxygen therapy, 4 L/min  6. Recent saddle pulmonary emboli on chronic anticoagulation (Eliquis)  7. GERD  8. Spinal stenosis    PLAN:  Continue with the 3 times a week Zithromax as part of his home regimen  Continue with the Mucinex  Continue with the antitussive medication at night  Continue with the Flonase and the azelastine nasal spray  We will go ahead and schedule a bronchoscopy tomorrow for potential therapeutic retrieval of retained difficulty expectorate secretions/mucous plugs  Patient will be n.p.o. after midnight, the procedure will be scheduled for 9 AM tomorrow  No need to discontinue the Eliquis, this could be done while on full dose anticoagulation since no biopsy or sharp object would be used    We will continue to follow    Discussed with patient    Disposition: Home    Katharina Salter MD  01/03/23  10:21 EST           Dictated utilizing Dragon dictation

## 2023-01-04 ENCOUNTER — ANESTHESIA EVENT (OUTPATIENT)
Dept: GASTROENTEROLOGY | Facility: HOSPITAL | Age: 83
DRG: 193 | End: 2023-01-04
Payer: MEDICARE

## 2023-01-04 ENCOUNTER — ANESTHESIA (OUTPATIENT)
Dept: GASTROENTEROLOGY | Facility: HOSPITAL | Age: 83
DRG: 193 | End: 2023-01-04
Payer: MEDICARE

## 2023-01-04 LAB
ANION GAP SERPL CALCULATED.3IONS-SCNC: 7 MMOL/L (ref 5–15)
BACTERIA SPEC RESP CULT: NORMAL
BASOPHILS # BLD AUTO: 0.09 10*3/MM3 (ref 0–0.2)
BASOPHILS NFR BLD AUTO: 1 % (ref 0–1.5)
BUN SERPL-MCNC: 29 MG/DL (ref 8–23)
BUN/CREAT SERPL: 31.9 (ref 7–25)
CALCIUM SPEC-SCNC: 8.9 MG/DL (ref 8.6–10.5)
CHLORIDE SERPL-SCNC: 100 MMOL/L (ref 98–107)
CO2 SERPL-SCNC: 28 MMOL/L (ref 22–29)
CREAT SERPL-MCNC: 0.91 MG/DL (ref 0.76–1.27)
DEPRECATED RDW RBC AUTO: 45.3 FL (ref 37–54)
EGFRCR SERPLBLD CKD-EPI 2021: 83.1 ML/MIN/1.73
EOSINOPHIL # BLD AUTO: 1.76 10*3/MM3 (ref 0–0.4)
EOSINOPHIL NFR BLD AUTO: 19.9 % (ref 0.3–6.2)
ERYTHROCYTE [DISTWIDTH] IN BLOOD BY AUTOMATED COUNT: 12.7 % (ref 12.3–15.4)
GLUCOSE SERPL-MCNC: 101 MG/DL (ref 65–99)
GRAM STN SPEC: NORMAL
GRAM STN SPEC: NORMAL
HCT VFR BLD AUTO: 34.1 % (ref 37.5–51)
HGB BLD-MCNC: 11.4 G/DL (ref 13–17.7)
IMM GRANULOCYTES # BLD AUTO: 0.03 10*3/MM3 (ref 0–0.05)
IMM GRANULOCYTES NFR BLD AUTO: 0.3 % (ref 0–0.5)
LYMPHOCYTES # BLD AUTO: 1.89 10*3/MM3 (ref 0.7–3.1)
LYMPHOCYTES NFR BLD AUTO: 21.4 % (ref 19.6–45.3)
MCH RBC QN AUTO: 32.1 PG (ref 26.6–33)
MCHC RBC AUTO-ENTMCNC: 33.4 G/DL (ref 31.5–35.7)
MCV RBC AUTO: 96.1 FL (ref 79–97)
MONOCYTES # BLD AUTO: 0.99 10*3/MM3 (ref 0.1–0.9)
MONOCYTES NFR BLD AUTO: 11.2 % (ref 5–12)
NEUTROPHILS NFR BLD AUTO: 4.08 10*3/MM3 (ref 1.7–7)
NEUTROPHILS NFR BLD AUTO: 46.2 % (ref 42.7–76)
NRBC BLD AUTO-RTO: 0 /100 WBC (ref 0–0.2)
PLATELET # BLD AUTO: 314 10*3/MM3 (ref 140–450)
PMV BLD AUTO: 9.1 FL (ref 6–12)
POTASSIUM SERPL-SCNC: 4.4 MMOL/L (ref 3.5–5.2)
RBC # BLD AUTO: 3.55 10*6/MM3 (ref 4.14–5.8)
SODIUM SERPL-SCNC: 135 MMOL/L (ref 136–145)
WBC NRBC COR # BLD: 8.84 10*3/MM3 (ref 3.4–10.8)

## 2023-01-04 PROCEDURE — 94664 DEMO&/EVAL PT USE INHALER: CPT

## 2023-01-04 PROCEDURE — 94668 MNPJ CHEST WALL SBSQ: CPT

## 2023-01-04 PROCEDURE — 94799 UNLISTED PULMONARY SVC/PX: CPT

## 2023-01-04 PROCEDURE — 0BDM8ZX EXTRACTION OF BILATERAL LUNGS, VIA NATURAL OR ARTIFICIAL OPENING ENDOSCOPIC, DIAGNOSTIC: ICD-10-PCS | Performed by: INTERNAL MEDICINE

## 2023-01-04 PROCEDURE — 94761 N-INVAS EAR/PLS OXIMETRY MLT: CPT

## 2023-01-04 PROCEDURE — 25010000002 PROPOFOL 10 MG/ML EMULSION: Performed by: ANESTHESIOLOGY

## 2023-01-04 PROCEDURE — 85025 COMPLETE CBC W/AUTO DIFF WBC: CPT | Performed by: STUDENT IN AN ORGANIZED HEALTH CARE EDUCATION/TRAINING PROGRAM

## 2023-01-04 PROCEDURE — 87070 CULTURE OTHR SPECIMN AEROBIC: CPT | Performed by: INTERNAL MEDICINE

## 2023-01-04 PROCEDURE — 87205 SMEAR GRAM STAIN: CPT | Performed by: INTERNAL MEDICINE

## 2023-01-04 PROCEDURE — 25010000002 FUROSEMIDE PER 20 MG: Performed by: INTERNAL MEDICINE

## 2023-01-04 PROCEDURE — 94669 MECHANICAL CHEST WALL OSCILL: CPT

## 2023-01-04 PROCEDURE — 80048 BASIC METABOLIC PNL TOTAL CA: CPT | Performed by: STUDENT IN AN ORGANIZED HEALTH CARE EDUCATION/TRAINING PROGRAM

## 2023-01-04 RX ORDER — SODIUM CHLORIDE 0.9 % (FLUSH) 0.9 %
10 SYRINGE (ML) INJECTION AS NEEDED
Status: DISCONTINUED | OUTPATIENT
Start: 2023-01-04 | End: 2023-01-06 | Stop reason: HOSPADM

## 2023-01-04 RX ORDER — SODIUM CHLORIDE 9 MG/ML
40 INJECTION, SOLUTION INTRAVENOUS AS NEEDED
Status: DISCONTINUED | OUTPATIENT
Start: 2023-01-04 | End: 2023-01-06 | Stop reason: HOSPADM

## 2023-01-04 RX ORDER — PROPOFOL 10 MG/ML
VIAL (ML) INTRAVENOUS AS NEEDED
Status: DISCONTINUED | OUTPATIENT
Start: 2023-01-04 | End: 2023-01-04 | Stop reason: SURG

## 2023-01-04 RX ORDER — SODIUM CHLORIDE 0.9 % (FLUSH) 0.9 %
10 SYRINGE (ML) INJECTION EVERY 12 HOURS SCHEDULED
Status: DISCONTINUED | OUTPATIENT
Start: 2023-01-04 | End: 2023-01-06 | Stop reason: HOSPADM

## 2023-01-04 RX ORDER — SODIUM CHLORIDE, SODIUM LACTATE, POTASSIUM CHLORIDE, CALCIUM CHLORIDE 600; 310; 30; 20 MG/100ML; MG/100ML; MG/100ML; MG/100ML
INJECTION, SOLUTION INTRAVENOUS CONTINUOUS PRN
Status: DISCONTINUED | OUTPATIENT
Start: 2023-01-04 | End: 2023-01-04 | Stop reason: SURG

## 2023-01-04 RX ORDER — LIDOCAINE HYDROCHLORIDE 20 MG/ML
INJECTION, SOLUTION EPIDURAL; INFILTRATION; INTRACAUDAL; PERINEURAL AS NEEDED
Status: DISCONTINUED | OUTPATIENT
Start: 2023-01-04 | End: 2023-01-04 | Stop reason: HOSPADM

## 2023-01-04 RX ORDER — LIDOCAINE HYDROCHLORIDE 20 MG/ML
INJECTION, SOLUTION INFILTRATION; PERINEURAL AS NEEDED
Status: DISCONTINUED | OUTPATIENT
Start: 2023-01-04 | End: 2023-01-04 | Stop reason: SURG

## 2023-01-04 RX ORDER — SODIUM CHLORIDE 9 MG/ML
30 INJECTION, SOLUTION INTRAVENOUS CONTINUOUS PRN
Status: DISCONTINUED | OUTPATIENT
Start: 2023-01-04 | End: 2023-01-06 | Stop reason: HOSPADM

## 2023-01-04 RX ORDER — IPRATROPIUM BROMIDE AND ALBUTEROL SULFATE 2.5; .5 MG/3ML; MG/3ML
3 SOLUTION RESPIRATORY (INHALATION) ONCE
Status: COMPLETED | OUTPATIENT
Start: 2023-01-04 | End: 2023-01-04

## 2023-01-04 RX ADMIN — Medication 10 ML: at 11:56

## 2023-01-04 RX ADMIN — APIXABAN 10 MG: 5 TABLET, FILM COATED ORAL at 11:49

## 2023-01-04 RX ADMIN — ALBUTEROL SULFATE 2.5 MG: 2.5 SOLUTION RESPIRATORY (INHALATION) at 20:37

## 2023-01-04 RX ADMIN — GABAPENTIN 300 MG: 300 CAPSULE ORAL at 16:19

## 2023-01-04 RX ADMIN — FUROSEMIDE 80 MG: 10 INJECTION, SOLUTION INTRAMUSCULAR; INTRAVENOUS at 18:16

## 2023-01-04 RX ADMIN — IPRATROPIUM BROMIDE AND ALBUTEROL SULFATE 3 ML: 2.5; .5 SOLUTION RESPIRATORY (INHALATION) at 10:08

## 2023-01-04 RX ADMIN — ACETYLCYSTEINE 3 ML: 200 SOLUTION ORAL; RESPIRATORY (INHALATION) at 12:19

## 2023-01-04 RX ADMIN — GUAIFENESIN 600 MG: 600 TABLET, EXTENDED RELEASE ORAL at 11:49

## 2023-01-04 RX ADMIN — AZELASTINE HYDROCHLORIDE 2 SPRAY: 137 SPRAY, METERED NASAL at 20:00

## 2023-01-04 RX ADMIN — SODIUM CHLORIDE, POTASSIUM CHLORIDE, SODIUM LACTATE AND CALCIUM CHLORIDE: 600; 310; 30; 20 INJECTION, SOLUTION INTRAVENOUS at 09:01

## 2023-01-04 RX ADMIN — ALBUTEROL SULFATE 2.5 MG: 2.5 SOLUTION RESPIRATORY (INHALATION) at 03:26

## 2023-01-04 RX ADMIN — ACETAMINOPHEN 500 MG: 500 TABLET, FILM COATED ORAL at 11:56

## 2023-01-04 RX ADMIN — Medication 10 ML: at 20:01

## 2023-01-04 RX ADMIN — Medication 10 ML: at 11:57

## 2023-01-04 RX ADMIN — PROPOFOL 130 MG: 10 INJECTION, EMULSION INTRAVENOUS at 09:09

## 2023-01-04 RX ADMIN — ALBUTEROL SULFATE 2.5 MG: 2.5 SOLUTION RESPIRATORY (INHALATION) at 17:10

## 2023-01-04 RX ADMIN — SODIUM CHLORIDE SOLN NEBU 7% 4 ML: 7 NEBU SOLN at 20:42

## 2023-01-04 RX ADMIN — GABAPENTIN 300 MG: 300 CAPSULE ORAL at 20:00

## 2023-01-04 RX ADMIN — HYDROCODONE POLISTIREX AND CHLORPHENIRAMINE POLISTIREX 2.5 ML: 10; 8 SUSPENSION, EXTENDED RELEASE ORAL at 20:00

## 2023-01-04 RX ADMIN — METAXALONE 800 MG: 800 TABLET ORAL at 18:16

## 2023-01-04 RX ADMIN — PANTOPRAZOLE SODIUM 40 MG: 40 TABLET, DELAYED RELEASE ORAL at 11:49

## 2023-01-04 RX ADMIN — ROPINIROLE HYDROCHLORIDE 0.5 MG: 0.5 TABLET, FILM COATED ORAL at 20:00

## 2023-01-04 RX ADMIN — AZELASTINE HYDROCHLORIDE 2 SPRAY: 137 SPRAY, METERED NASAL at 11:55

## 2023-01-04 RX ADMIN — PROPOFOL 130 MCG/KG/MIN: 10 INJECTION, EMULSION INTRAVENOUS at 09:09

## 2023-01-04 RX ADMIN — APIXABAN 10 MG: 5 TABLET, FILM COATED ORAL at 20:00

## 2023-01-04 RX ADMIN — BUDESONIDE 0.5 MG: 0.5 INHALANT ORAL at 20:37

## 2023-01-04 RX ADMIN — METHYLPHENIDATE HYDROCHLORIDE 10 MG: 10 TABLET ORAL at 11:50

## 2023-01-04 RX ADMIN — SODIUM CHLORIDE 30 ML/HR: 9 INJECTION, SOLUTION INTRAVENOUS at 08:42

## 2023-01-04 RX ADMIN — AZITHROMYCIN DIHYDRATE 250 MG: 250 TABLET, FILM COATED ORAL at 11:49

## 2023-01-04 RX ADMIN — ALBUTEROL SULFATE 2.5 MG: 2.5 SOLUTION RESPIRATORY (INHALATION) at 12:18

## 2023-01-04 RX ADMIN — GUAIFENESIN 600 MG: 600 TABLET, EXTENDED RELEASE ORAL at 20:00

## 2023-01-04 RX ADMIN — FUROSEMIDE 80 MG: 10 INJECTION, SOLUTION INTRAMUSCULAR; INTRAVENOUS at 11:50

## 2023-01-04 RX ADMIN — POTASSIUM CHLORIDE 20 MEQ: 750 TABLET, EXTENDED RELEASE ORAL at 11:50

## 2023-01-04 RX ADMIN — LIDOCAINE HYDROCHLORIDE 30 MG: 20 INJECTION, SOLUTION INFILTRATION; PERINEURAL at 09:06

## 2023-01-04 RX ADMIN — GABAPENTIN 300 MG: 300 CAPSULE ORAL at 11:48

## 2023-01-04 RX ADMIN — FLUTICASONE PROPIONATE 2 SPRAY: 50 SPRAY, METERED NASAL at 11:55

## 2023-01-04 NOTE — ANESTHESIA PROCEDURE NOTES
Airway  Urgency: elective    Date/Time: 1/4/2023 9:11 AM  Airway not difficult    General Information and Staff    Patient location during procedure: OR  Anesthesiologist: Lennox Gonzalez MD    Indications and Patient Condition  Indications for airway management: airway protection    Preoxygenated: yes  Mask difficulty assessment: 1 - vent by mask    Final Airway Details  Final airway type: supraglottic airway      Successful airway: LMA  Size 4     Number of attempts at approach: 1  Assessment: lips, teeth, and gum same as pre-op

## 2023-01-04 NOTE — PLAN OF CARE
Goal Outcome Evaluation:         Remains on 3L NC. PRN nightly cough meds given and effective. NPO since midnight for bronchoscopy in AM. VSS.

## 2023-01-04 NOTE — CASE MANAGEMENT/SOCIAL WORK
Continued Stay Note  [facility]     Patient Name: Tony Leonard  MRN: [id number]  Today's Date: 1/4/2023    Admit Date: 12/29/2022    [place] [facility] following.   Discharge Plan     Row Name 01/04/23 1430       Plan    Plan [place] [facility] following.    Patient/Family in Agreement with Plan yes    Plan Comments Spoke with patient at bedside.  He again states [place] plans to [place].  [facility] is still follow. He states he uses his O2 at night.  May need continuous at DC.  CCP continues to follow.  Adán TAVARES                   Expected Discharge Date and Time     Expected Discharge Date Expected Discharge Time    Jan 5, 2023             Becky S. [place], RN

## 2023-01-04 NOTE — ANESTHESIA POSTPROCEDURE EVALUATION
Patient: Tony Leonard    Procedure Summary     Date: 01/04/23 Room / Location:  [facility]    Anesthesia Start: 0901 Anesthesia Stop: 0934    Procedure: BRONCHOSCOPY with lavage (Bilateral: Bronchus) Diagnosis:       COPD exacerbation (HCC)      (COPD exacerbation (HCC) [J44.1])    Surgeons: Katharina Salter MD Provider: Lennox Gonzalez MD    Anesthesia Type: general ASA Status: 4          Anesthesia Type: general    Vitals  Vitals Value Taken Time   /64 01/04/23 1022   Temp 36.8 °C (98.3 °F) 01/04/23 0952   Pulse 87 01/04/23 1022   Resp 18 01/04/23 1022   SpO2 92 % 01/04/23 1022           Post Anesthesia Care and Evaluation    Patient location during evaluation: bedside  Patient participation: complete - patient participated  Level of consciousness: awake and alert  Pain management: adequate    Airway patency: patent  Anesthetic complications: No anesthetic complications  PONV Status: none  Cardiovascular status: acceptable  Respiratory status: acceptable  Hydration status: acceptable    Comments: /64 (BP Location: Left arm, Patient Position: Lying)   Pulse 87   Temp 36.8 °C (98.3 °F) (Oral)   Resp 18   Ht 180.3 cm (71\")   Wt 79.8 kg (176 lb)   SpO2 92%   BMI 24.55 kg/m²

## 2023-01-04 NOTE — SIGNIFICANT NOTE
01/04/23 0819   OTHER   Discipline physical therapist   Rehab Time/Intention   Session Not Performed patient unavailable for treatment  (pt RAY this AM for bronchoscopy; will f/u tomorrow)   Therapy Assessment/Plan (PT)   Criteria for Skilled Interventions Met (PT) yes   Recommendation   PT - Next Appointment 01/05/23

## 2023-01-04 NOTE — PROGRESS NOTES
"DAILY PROGRESS NOTE  [facility]    Patient Identification:  Name: Tony Leonard  Age: 82 y.o.  Sex: male  :  1940  MRN: [id number]         Primary Care Physician: Erich Aviles MD      Subjective  Patient on 4L via nasal cannula.  Recently returned from bronchoscopy.  He reports muscle spasms are relieved with addition of Skelaxin yesterday.    Objective:  General Appearance:  Comfortable and in no acute distress (Chronically ill-appearing.).    Vital signs: (most recent): Blood pressure 120/79, pulse 93, temperature 97.7 °F (36.5 °C), temperature source Oral, resp. rate 20, height 180.3 cm (71\"), weight 79.8 kg (176 lb), SpO2 96 %.    Lungs:  Normal effort and normal respiratory rate.  He is not in respiratory distress.  No stridor.  There are rhonchi (improving).  No rales, decreased breath sounds or wheezes.  (Rhonchi throughout. )  Heart: Normal rate.  Regular rhythm.  S1 normal.    Extremities: There is no dependent edema.    Neurological: Patient is alert and oriented to person, place and time.    Skin:  Warm and dry.            Vital signs in last 24 hours:  Temp:  [97.7 °F (36.5 °C)-99.5 °F (37.5 °C)] 97.7 °F (36.5 °C)  Heart Rate:  [] 93  Resp:  [16-20] 20  BP: ()/() 120/79    Intake/Output:    Intake/Output Summary (Last 24 hours) at 2023 1411  Last data filed at 2023 1339  Gross per 24 hour   Intake 1350 ml   Output 960 ml   Net 390 ml         Results from last 7 days   Lab Units 23  0656 23  0628 23  0811 23  1136 22  0823 22  0643 22  1204   WBC 10*3/mm3 8.84 9.03 12.38* 9.31 9.36 9.09 8.63   HEMOGLOBIN g/dL 11.4* 12.2* 12.1* 12.2* 12.1* 11.8* 13.2   PLATELETS 10*3/mm3 314 293 259 250 238 225 233     Results from last 7 days   Lab Units 23  0656 23  1726 23  0628 23  0811 23  2358 23  1136 22  0822 22  0643 22  1204   SODIUM mmol/L 135*  --  136 137  " --  135* 139 139 136   POTASSIUM mmol/L 4.4 4.4 3.5 4.2 4.5 3.4* 3.0* 3.5 4.2   CHLORIDE mmol/L 100  --  95* 98  --  96* 98 101 102   CO2 mmol/L 28.0  --  28.6 27.5  --  29.0 29.4* 26.9 25.1   BUN mg/dL 29*  --  29* 29*  --  33* 27* 16 11   CREATININE mg/dL 0.91  --  0.89 0.80  --  0.96 1.21 1.18 0.96   GLUCOSE mg/dL 101*  --  107* 110*  --  118* 109* 90 85   Estimated Creatinine Clearance: 68.2 mL/min (by C-G formula based on SCr of 0.91 mg/dL).  Results from last 7 days   Lab Units 01/04/23  0656 01/03/23  0628 01/02/23  0811 01/01/23  1136 12/31/22  0822 12/30/22  0643 12/29/22  1204   CALCIUM mg/dL 8.9 8.6 9.0 8.8 8.5* 8.5* 9.4   ALBUMIN g/dL  --   --   --   --   --  3.4* 4.1     Results from last 7 days   Lab Units 12/30/22  0643 12/29/22  1204   ALBUMIN g/dL 3.4* 4.1   BILIRUBIN mg/dL 0.8 0.9   ALK PHOS U/L 71 86   AST (SGOT) U/L 16 27   ALT (SGPT) U/L 10 19       Assessment:  Acute on chronic hypoxic respiratory failure/COPD/bronchiectasis : Suspect secondary to COPD exacerbation.  Per pulm- home oxygen is 4 L/min.   Appreciate pulm seeing him. Antitussive/mucolytics. Bronchoscopy completed on 1/4  Acute on chronic CHF with preserved LVH: IV lasix for volume overload- improvement in O2. -910 UOP yesterday (? Correct).  Monitor kidney function on daily BMP. Cr 0.9 today.  Pulmonary infiltrate:   Normal procalcitonin.  No fever.  No leukocytosis.  Monitor off abx.   Hypokalemia: replace per protocol  Recent history of saddle pulmonary embolus: Eliquis.   Chronic pain: Continue home medications.  ADD: Continue home medications.    Plan:  Please see above.    Elisa Tony MD  1/4/2023  14:11 EST

## 2023-01-04 NOTE — PROGRESS NOTES
Patient has been n.p.o. since midnight  Patient had  Medical nitro spray to help with acid suppression with no good response  Patient needed a therapeutic bronchoscopy for secretion retrieval  Patient has bronchiectasis and COPD with difficulty expectorating and is on outpatient measure trying to prevent that from happening occasional flareups  Exam showed wet sounding secretions with rhonchi  No contraindication for the bronchoscopy we will proceed as planned

## 2023-01-04 NOTE — PLAN OF CARE
Goal Outcome Evaluation:  Plan of Care Reviewed With: patient        Progress: improving  Outcome Evaluation: Pt had bronch today- multiple plugs removed; IV lasix given x2; pt on 3-4L of NC; Pt is assist x1 with his walker; VSS.

## 2023-01-05 LAB
ANION GAP SERPL CALCULATED.3IONS-SCNC: 9.4 MMOL/L (ref 5–15)
BASOPHILS # BLD AUTO: 0.08 10*3/MM3 (ref 0–0.2)
BASOPHILS NFR BLD AUTO: 0.8 % (ref 0–1.5)
BUN SERPL-MCNC: 34 MG/DL (ref 8–23)
BUN/CREAT SERPL: 31.8 (ref 7–25)
CALCIUM SPEC-SCNC: 9 MG/DL (ref 8.6–10.5)
CHLORIDE SERPL-SCNC: 93 MMOL/L (ref 98–107)
CO2 SERPL-SCNC: 30.6 MMOL/L (ref 22–29)
CREAT SERPL-MCNC: 1.07 MG/DL (ref 0.76–1.27)
DEPRECATED RDW RBC AUTO: 46.2 FL (ref 37–54)
EGFRCR SERPLBLD CKD-EPI 2021: 68.4 ML/MIN/1.73
EOSINOPHIL # BLD AUTO: 1.2 10*3/MM3 (ref 0–0.4)
EOSINOPHIL NFR BLD AUTO: 12.1 % (ref 0.3–6.2)
ERYTHROCYTE [DISTWIDTH] IN BLOOD BY AUTOMATED COUNT: 12.8 % (ref 12.3–15.4)
GLUCOSE SERPL-MCNC: 105 MG/DL (ref 65–99)
HCT VFR BLD AUTO: 36.1 % (ref 37.5–51)
HGB BLD-MCNC: 12.2 G/DL (ref 13–17.7)
IMM GRANULOCYTES # BLD AUTO: 0.02 10*3/MM3 (ref 0–0.05)
IMM GRANULOCYTES NFR BLD AUTO: 0.2 % (ref 0–0.5)
LYMPHOCYTES # BLD AUTO: 1.69 10*3/MM3 (ref 0.7–3.1)
LYMPHOCYTES NFR BLD AUTO: 17.1 % (ref 19.6–45.3)
MCH RBC QN AUTO: 33.1 PG (ref 26.6–33)
MCHC RBC AUTO-ENTMCNC: 33.8 G/DL (ref 31.5–35.7)
MCV RBC AUTO: 97.8 FL (ref 79–97)
MONOCYTES # BLD AUTO: 1.34 10*3/MM3 (ref 0.1–0.9)
MONOCYTES NFR BLD AUTO: 13.5 % (ref 5–12)
NEUTROPHILS NFR BLD AUTO: 5.58 10*3/MM3 (ref 1.7–7)
NEUTROPHILS NFR BLD AUTO: 56.3 % (ref 42.7–76)
NRBC BLD AUTO-RTO: 0 /100 WBC (ref 0–0.2)
PLATELET # BLD AUTO: 322 10*3/MM3 (ref 140–450)
PMV BLD AUTO: 9.2 FL (ref 6–12)
POTASSIUM SERPL-SCNC: 3.3 MMOL/L (ref 3.5–5.2)
RBC # BLD AUTO: 3.69 10*6/MM3 (ref 4.14–5.8)
SODIUM SERPL-SCNC: 133 MMOL/L (ref 136–145)
WBC NRBC COR # BLD: 9.91 10*3/MM3 (ref 3.4–10.8)

## 2023-01-05 PROCEDURE — 94799 UNLISTED PULMONARY SVC/PX: CPT

## 2023-01-05 PROCEDURE — 97116 GAIT TRAINING THERAPY: CPT

## 2023-01-05 PROCEDURE — 97535 SELF CARE MNGMENT TRAINING: CPT

## 2023-01-05 PROCEDURE — 85025 COMPLETE CBC W/AUTO DIFF WBC: CPT | Performed by: STUDENT IN AN ORGANIZED HEALTH CARE EDUCATION/TRAINING PROGRAM

## 2023-01-05 PROCEDURE — 25010000002 FUROSEMIDE PER 20 MG: Performed by: INTERNAL MEDICINE

## 2023-01-05 PROCEDURE — 94669 MECHANICAL CHEST WALL OSCILL: CPT

## 2023-01-05 PROCEDURE — 94664 DEMO&/EVAL PT USE INHALER: CPT

## 2023-01-05 PROCEDURE — 80048 BASIC METABOLIC PNL TOTAL CA: CPT | Performed by: STUDENT IN AN ORGANIZED HEALTH CARE EDUCATION/TRAINING PROGRAM

## 2023-01-05 PROCEDURE — 97530 THERAPEUTIC ACTIVITIES: CPT

## 2023-01-05 PROCEDURE — 94761 N-INVAS EAR/PLS OXIMETRY MLT: CPT

## 2023-01-05 RX ADMIN — METHYLPHENIDATE HYDROCHLORIDE 10 MG: 10 TABLET ORAL at 08:30

## 2023-01-05 RX ADMIN — FLUTICASONE PROPIONATE 2 SPRAY: 50 SPRAY, METERED NASAL at 08:31

## 2023-01-05 RX ADMIN — APIXABAN 10 MG: 5 TABLET, FILM COATED ORAL at 08:30

## 2023-01-05 RX ADMIN — AZELASTINE HYDROCHLORIDE 2 SPRAY: 137 SPRAY, METERED NASAL at 21:02

## 2023-01-05 RX ADMIN — SODIUM CHLORIDE SOLN NEBU 7% 4 ML: 7 NEBU SOLN at 19:50

## 2023-01-05 RX ADMIN — BUDESONIDE 0.5 MG: 0.5 INHALANT ORAL at 07:34

## 2023-01-05 RX ADMIN — Medication 10 ML: at 08:31

## 2023-01-05 RX ADMIN — POTASSIUM CHLORIDE 20 MEQ: 750 TABLET, EXTENDED RELEASE ORAL at 08:31

## 2023-01-05 RX ADMIN — ROPINIROLE HYDROCHLORIDE 0.5 MG: 0.5 TABLET, FILM COATED ORAL at 21:01

## 2023-01-05 RX ADMIN — ALBUTEROL SULFATE 2.5 MG: 2.5 SOLUTION RESPIRATORY (INHALATION) at 03:53

## 2023-01-05 RX ADMIN — ALBUTEROL SULFATE 2.5 MG: 2.5 SOLUTION RESPIRATORY (INHALATION) at 19:48

## 2023-01-05 RX ADMIN — GUAIFENESIN 600 MG: 600 TABLET, EXTENDED RELEASE ORAL at 21:04

## 2023-01-05 RX ADMIN — GABAPENTIN 300 MG: 300 CAPSULE ORAL at 21:01

## 2023-01-05 RX ADMIN — ACETAMINOPHEN 500 MG: 500 TABLET, FILM COATED ORAL at 08:30

## 2023-01-05 RX ADMIN — Medication 10 ML: at 21:01

## 2023-01-05 RX ADMIN — ALBUTEROL SULFATE 2.5 MG: 2.5 SOLUTION RESPIRATORY (INHALATION) at 07:28

## 2023-01-05 RX ADMIN — BUDESONIDE 0.5 MG: 0.5 INHALANT ORAL at 19:49

## 2023-01-05 RX ADMIN — PANTOPRAZOLE SODIUM 40 MG: 40 TABLET, DELAYED RELEASE ORAL at 08:31

## 2023-01-05 RX ADMIN — Medication 10 ML: at 21:02

## 2023-01-05 RX ADMIN — GABAPENTIN 300 MG: 300 CAPSULE ORAL at 16:19

## 2023-01-05 RX ADMIN — FUROSEMIDE 80 MG: 10 INJECTION, SOLUTION INTRAMUSCULAR; INTRAVENOUS at 08:31

## 2023-01-05 RX ADMIN — GABAPENTIN 300 MG: 300 CAPSULE ORAL at 08:31

## 2023-01-05 RX ADMIN — APIXABAN 10 MG: 5 TABLET, FILM COATED ORAL at 21:01

## 2023-01-05 RX ADMIN — AZELASTINE HYDROCHLORIDE 2 SPRAY: 137 SPRAY, METERED NASAL at 08:31

## 2023-01-05 RX ADMIN — METAXALONE 800 MG: 800 TABLET ORAL at 16:20

## 2023-01-05 RX ADMIN — SODIUM CHLORIDE SOLN NEBU 7% 4 ML: 7 NEBU SOLN at 07:30

## 2023-01-05 RX ADMIN — GUAIFENESIN 600 MG: 600 TABLET, EXTENDED RELEASE ORAL at 08:30

## 2023-01-05 NOTE — PROGRESS NOTES
Name: Tony Leonard ADMIT: 2022   : 1940  PCP: Erich Aviles MD    MRN: [id number] LOS: 6 days   AGE/SEX: 82 y.o. male  ROOM: ENDO/ENDO     Subjective   Subjective   Sitting up in his recliner, remains on 3L, still with productive cough. Otherwise no new medical complaints. D/w RN.         Objective   Objective   Vital Signs  Temp:  [97.2 °F (36.2 °C)-99.4 °F (37.4 °C)] 98.2 °F (36.8 °C)  Heart Rate:  [] 93  Resp:  [16-20] 16  BP: (102-120)/(56-68) 112/67  SpO2:  [90 %-97 %] 94 %  on  Flow (L/min):  [2-3] 3;   Device (Oxygen Therapy): nasal cannula  Body mass index is 24.55 kg/m².  Physical Exam  Constitutional:       General: He is not in acute distress.     Appearance: He is ill-appearing.      Comments: Chronically ill   Cardiovascular:      Rate and Rhythm: Normal rate and regular rhythm.      Heart sounds: No murmur heard.  Pulmonary:      Effort: Pulmonary effort is normal. No respiratory distress.      Breath sounds: Rhonchi (scattered) present.   Musculoskeletal:         General: No tenderness.      Right lower leg: No edema.      Left lower leg: No edema.   Skin:     General: Skin is warm and dry.   Neurological:      General: No focal deficit present.      Mental Status: He is alert and oriented to person, place, and time. Mental status is at baseline.      Motor: No weakness.         Results Review     I reviewed the patient's new clinical results.  Results from last 7 days   Lab Units 23  1105 23  0656 23  0628 23  0811   WBC 10*3/mm3 9.91 8.84 9.03 12.38*   HEMOGLOBIN g/dL 12.2* 11.4* 12.2* 12.1*   PLATELETS 10*3/mm3 322 314 293 259     Results from last 7 days   Lab Units 23  1104 23  0656 23  1726 23  0628 23  0811   SODIUM mmol/L 133* 135*  --  136 137   POTASSIUM mmol/L 3.3* 4.4 4.4 3.5 4.2   CHLORIDE mmol/L 93* 100  --  95* 98   CO2 mmol/L 30.6* 28.0  --  28.6 27.5   BUN mg/dL 34* 29*  --  29* 29*   CREATININE  mg/dL 1.07 0.91  --  0.89 0.80   GLUCOSE mg/dL 105* 101*  --  107* 110*   Estimated Creatinine Clearance: 58 mL/min (by C-G formula based on SCr of 1.07 mg/dL).  Results from last 7 days   Lab Units 12/30/22  0643   ALBUMIN g/dL 3.4*   BILIRUBIN mg/dL 0.8   ALK PHOS U/L 71   AST (SGOT) U/L 16   ALT (SGPT) U/L 10     Results from last 7 days   Lab Units 01/05/23  1104 01/04/23  0656 01/03/23  0628 01/02/23  0811 12/31/22  0822 12/30/22  0643   CALCIUM mg/dL 9.0 8.9 8.6 9.0   < > 8.5*   ALBUMIN g/dL  --   --   --   --   --  3.4*    < > = values in this interval not displayed.     Results from last 7 days   Lab Units 12/31/22  0822   PROCALCITONIN ng/mL 0.16     COVID19   Date Value Ref Range Status   12/31/2022 Not Detected Not Detected - Ref. Range Final   12/09/2022 Not Detected Not Detected - Ref. Range Final     No results found for: HGBA1C, POCGLU    XR Chest 1 View  Narrative: XR CHEST 1 VW-     HISTORY: Male who is 82 years-old,  short of breath     TECHNIQUE: Frontal view of the chest     COMPARISON: 12 /9/2022     FINDINGS: Heart, mediastinum and pulmonary vasculature are unremarkable.  Hazy opacity at the right apex may represent infiltrate, follow-up  recommended. No pleural effusion, or pneumothorax. No acute osseous  process.     Impression: Hazy opacity at the right apex, follow-up recommended.     This report was finalized on 12/29/2022 12:34 PM by Dr. Tj Evans M.D.       Scheduled Medications  acetylcysteine, 3 mL, Nebulization, BID - RT  albuterol, 2.5 mg, Nebulization, Q4H - RT  apixaban, 10 mg, Oral, Q12H   Followed by  [START ON 1/9/2023] apixaban, 5 mg, Oral, Q12H  azelastine, 2 spray, Each Nare, BID  azithromycin, 250 mg, Oral, Once per day on Mon Wed Fri  budesonide, 0.5 mg, Nebulization, BID - RT  fluticasone, 2 spray, Each Nare, Daily  gabapentin, 300 mg, Oral, TID  guaiFENesin, 600 mg, Oral, Q12H  methylphenidate, 10 mg, Oral, Daily  pantoprazole, 40 mg, Oral, Daily  potassium  chloride, 20 mEq, Oral, Daily  rOPINIRole, 0.5 mg, Oral, Nightly  sodium chloride, 10 mL, Intravenous, Q12H  sodium chloride, 10 mL, Intravenous, Q12H  sodium chloride, 4 mL, Nebulization, BID - RT    Infusions  sodium chloride, 30 mL/hr, Last Rate: 30 mL/hr (01/04/23 0842)    Diet  Diet: Cardiac Diets; Healthy Heart (2-3 Na+); Texture: Regular Texture (IDDSI 7); Fluid Consistency: Thin (IDDSI 0)         I have personally reviewed:  [x]  Laboratory   []  Microbiology   []  Radiology   [x]  EKG/Telemetry    []  Cardiology/Vascular   []  Pathology   []  Records    Assessment/Plan     Active Hospital Problems    Diagnosis  POA   • **Acute on chronic respiratory failure with hypoxia (HCC) [J96.21]  Yes   • Pneumonia of right upper lobe due to infectious organism [J18.9]  Yes   • Acute saddle pulmonary embolism without acute cor pulmonale (HCC) - 12/11/2022 [I26.92]  Yes   • Elevated troponin level not due myocardial infarction [R77.8]  Yes   • Spinal stenosis, lumbar region with neurogenic claudication [M48.062]  Yes   • COPD exacerbation (HCC) [J44.1]  Yes   • Chronic diastolic (congestive) heart failure (HCC) [I50.32]  Yes   • On home oxygen therapy [Z99.81]  Not Applicable   • Prostate cancer (HCC) [C61]  Yes   • COPD (chronic obstructive pulmonary disease) (HCC) [J44.9]  Yes   • ADD (attention deficit disorder) [F98.8]  Yes      Resolved Hospital Problems   No resolved problems to display.       82 y.o. male admitted with Acute on chronic respiratory failure with hypoxia (HCC).    Acute on chronic hypoxic respiratory failure  COPD/Bronchiectasis  Recurrent mucous plugging  - Per pulm- home oxygen is 4 L/min although pt reports nocturnal O2 only  - Continue antitussive/mucolytics/bronchodilators  - Bronchoscopy completed on 1/4 with mucous plugs suctioned    Chronic CHF with preserved LVH  - Monitor daily BMP  - possible mild exacerbation- treated with IV lasix while admitted   - IV lasix stopped  1/5    Hypokalemia  - replace per protocol    Recent history of saddle pulmonary embolus   - Continue Eliquis    Chronic pain  - Continue home regimen  - Add skelaxin for muscle relaxers    ADD  - Continue home ritalin    · Eliquis (home med) for DVT prophylaxis.  · Full code.  · Discussed with patient, nursing staff, CCP and care team on multidisciplinary rounds.  · Anticipate discharge home timing yet to be determined.      Elisa Tony MD  [facility]  01/05/23  14:27 EST    I wore protective equipment throughout this patient encounter including a face mask, gloves and protective eyewear.  Hand hygiene was performed before donning protective equipment and after removal when leaving the room.

## 2023-01-05 NOTE — PLAN OF CARE
Goal Outcome Evaluation:  Plan of Care Reviewed With: patient        Progress: improving  Outcome Evaluation: Pt seen for co-tx by PT and OT this date. RN reporting pt was very unsteady earlier this date and with increased O2 requirement. Pt on 5Ls throughout session and tolerating mobility without desaturation on NC. Pt requiring Alejandra for toilet transer this date and reporting his toilet at home is higher and easier to manage. Able to complete mobility with CGA and FWW. Pt would like to return to home with HH at d/c. Will con't to follow for stated goals.

## 2023-01-05 NOTE — PLAN OF CARE
Goal Outcome Evaluation:  Plan of Care Reviewed With: patient        Progress: no change  Outcome Evaluation: Pt coughing less today; c/o pain in hips and back- PO tylenol given with muscle spasm relaxant; Pt up to bathroom with assist x1- multiple times today; Pt worked with PT; Possible DC home with HH tomorrow back to independent living facility; Pt remains on 3-4L of O2.

## 2023-01-05 NOTE — PROGRESS NOTES
"  PROGRESS NOTE  Patient Name: Tony Leonard  Age/Sex: 82 y.o. male  : 1940  MRN: [id number]    Date of Admission: 2022  Date of Encounter Visit: 23   LOS: 6 days   Patient Care Team:  Erich Aviles MD as PCP - General (Internal Medicine)  Katharina Salter MD as Consulting Physician (Pulmonary Disease)  Anum Bhatt MD as Consulting Physician (Pain Medicine)  Azeb Cook RN as Ambulatory  ([facility])  Otilia Shepard MSW as  (Amb Case Mgmt) ([facility])    Chief Complaint: Acute on chronic respiratory failure, COPD, recent pulmonary embolus, edema    Hospital course: Patient had a bronchoscopy yesterday and we were able to retrieve some large mucous plugs.  He is afebrile, he is on 3 L nasal cannula and can be weaned further.  Is still having this cough but no longer productive.  He is afebrile        REVIEW OF SYSTEMS:   CONSTITUTIONAL: no fever or chills  CARDIOVASCULAR: No chest pain, chest pressure or chest discomfort. No palpitations, positive edema, improving.   RESPIRATORY:  positive for cough with wheezing but no more productive secretions  GASTROINTESTINAL: No anorexia, nausea, vomiting or diarrhea. No abdominal pain or blood.   HEMATOLOGIC: No bleeding or bruising.     Ventilator/Non-Invasive Ventilation Settings (From admission, onward)    3 L/min            Vital Signs  Temp:  [97.2 °F (36.2 °C)-99.4 °F (37.4 °C)] 98.2 °F (36.8 °C)  Heart Rate:  [] 93  Resp:  [16-20] 16  BP: (102-120)/(56-68) 112/67  SpO2:  [90 %-97 %] 94 %  on  Flow (L/min):  [2-3] 3 Device (Oxygen Therapy): nasal cannula    Intake/Output Summary (Last 24 hours) at 2023 1455  Last data filed at 2023 0900  Gross per 24 hour   Intake 720 ml   Output 2340 ml   Net -1620 ml     Flowsheet Rows    Flowsheet Row First Filed Value   Admission Height 180.3 cm (71\") Documented at 2022 1136   Admission Weight 79.8 kg (176 lb) Documented at 2022 " 1136        Body mass index is 24.55 kg/m².      12/29/22  1136   Weight: 79.8 kg (176 lb)       Physical Exam:  GEN:  No acute distress, alert, cooperative, well developed   EYES:   Sclerae clear. No icterus. PERRL. Normal EOM  ENT:   External ears/nose normal, no oral lesions, no thrush, mucous membranes moist  NECK:  Supple, midline trachea, no JVD  LUNGS: Normal chest on inspection, no audible secretion but positive cough and wheezing. Respirations regular, even and unlabored.   CV:  Regular rhythm and rate. Normal S1/S2. No murmurs, gallops, or rubs noted.  ABD:  Soft, nontender and nondistended. Normal bowel sounds. No guarding  EXT:  Moves all extremities well. No cyanosis. No redness.  Positive minimal residual lower extremity edema but improving  Skin: Dry, intact, no bleeding    Results Review:    Results From Last 14 Days   Lab Units 12/29/22  1204   LACTATE mmol/L 1.4     Results from last 7 days   Lab Units 01/05/23  1104 01/04/23  0656 01/03/23  1726 01/03/23  0628 01/02/23  0811 01/01/23  2358 01/01/23  1136 12/31/22  0822 12/30/22  0643   SODIUM mmol/L 133* 135*  --  136 137  --  135* 139 139   POTASSIUM mmol/L 3.3* 4.4 4.4 3.5 4.2 4.5 3.4* 3.0* 3.5   CHLORIDE mmol/L 93* 100  --  95* 98  --  96* 98 101   CO2 mmol/L 30.6* 28.0  --  28.6 27.5  --  29.0 29.4* 26.9   BUN mg/dL 34* 29*  --  29* 29*  --  33* 27* 16   CREATININE mg/dL 1.07 0.91  --  0.89 0.80  --  0.96 1.21 1.18   CALCIUM mg/dL 9.0 8.9  --  8.6 9.0  --  8.8 8.5* 8.5*   AST (SGOT) U/L  --   --   --   --   --   --   --   --  16   ALT (SGPT) U/L  --   --   --   --   --   --   --   --  10   ANION GAP mmol/L 9.4 7.0  --  12.4 11.5  --  10.0 11.6 11.1   ALBUMIN g/dL  --   --   --   --   --   --   --   --  3.4*                 Results from last 7 days   Lab Units 01/05/23  1105 01/04/23  0656 01/03/23  0628 01/02/23  0811 01/01/23  1136 12/31/22  0823 12/30/22  0643   WBC 10*3/mm3 9.91 8.84 9.03 12.38* 9.31 9.36 9.09   HEMOGLOBIN g/dL 12.2* 11.4*  12.2* 12.1* 12.2* 12.1* 11.8*   HEMATOCRIT % 36.1* 34.1* 36.8* 36.0* 35.7* 34.9* 33.8*   PLATELETS 10*3/mm3 322 314 293 259 250 238 225   MCV fL 97.8* 96.1 97.4* 95.5 94.7 91.6 91.8   NEUTROPHIL % % 56.3 46.2 50.2 57.2 59.0 59.4 53.4   LYMPHOCYTE % % 17.1* 21.4 21.9 18.6* 17.0* 16.5* 15.8*   MONOCYTES % % 13.5* 11.2 10.7 9.8 12.2* 12.9* 15.6*   EOSINOPHIL % % 12.1* 19.9* 16.3* 13.7* 11.1* 10.6* 14.1*   BASOPHIL % % 0.8 1.0 0.7 0.3 0.4 0.4 0.9   IMM GRAN % % 0.2 0.3 0.2 0.4 0.3 0.2 0.2                   Invalid input(s): LDLCALC          No results found for: POCGLU  Results from last 7 days   Lab Units 12/31/22  0822   PROCALCITONIN ng/mL 0.16     Results from last 7 days   Lab Units 01/04/23  0916 01/02/23  0554   RESPCX  Scant growth (1+) The culture consists of normal respiratory cyndee. This is a preliminary report; final report to follow. Scant growth (1+) Normal respiratory cyndee. No S. aureus or Pseudomonas aeruginosa detected. Final report.         Results from last 7 days   Lab Units 12/31/22  1142   COVID19  Not Detected   ADENOVIRUS DETECTION BY PCR  Not Detected   CORONAVIRUS 229E  Not Detected   CORONAVIRUS HKU1  Not Detected   CORONAVIRUS NL63  Not Detected   CORONAVIRUS OC43  Not Detected   HUMAN METAPNEUMOVIRUS  Not Detected   HUMAN RHINOVIRUS/ENTEROVIRUS  Detected*   INFLUENZA B PCR  Not Detected   PARAINFLUENZA 1  Not Detected   PARAINFLUENZA VIRUS 2  Not Detected   PARAINFLUENZA VIRUS 3  Not Detected   PARAINFLUENZA VIRUS 4  Not Detected   BORDETELLA PERTUSSIS PCR  Not Detected   BORDETELLA PARAPERTUSSIS PCR  Not Detected   CHLAMYDOPHILA PNEUMONIAE PCR  Not Detected   MYCOPLAMA PNEUMO PCR  Not Detected   RSV, PCR  Not Detected               Imaging:   Imaging Results (All)     Procedure Component Value Units Date/Time          I reviewed the patient's new clinical results.  I personally viewed and interpreted the patient's imaging results:        Medication Review:   acetylcysteine, 3 mL,  Nebulization, BID - RT  albuterol, 2.5 mg, Nebulization, Q4H - RT  apixaban, 10 mg, Oral, Q12H   Followed by  [START ON 1/9/2023] apixaban, 5 mg, Oral, Q12H  azelastine, 2 spray, Each Nare, BID  azithromycin, 250 mg, Oral, Once per day on Mon Wed Fri  budesonide, 0.5 mg, Nebulization, BID - RT  fluticasone, 2 spray, Each Nare, Daily  gabapentin, 300 mg, Oral, TID  guaiFENesin, 600 mg, Oral, Q12H  methylphenidate, 10 mg, Oral, Daily  pantoprazole, 40 mg, Oral, Daily  potassium chloride, 20 mEq, Oral, Daily  rOPINIRole, 0.5 mg, Oral, Nightly  sodium chloride, 10 mL, Intravenous, Q12H  sodium chloride, 10 mL, Intravenous, Q12H  sodium chloride, 4 mL, Nebulization, BID - RT        sodium chloride, 30 mL/hr, Last Rate: 30 mL/hr (01/04/23 0842)        ASSESSMENT:   1. Acute on chronic hypoxic respiratory failure  2. Infiltrate of right upper lobe  3. 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 History of recurrent mucous plugging  5. Home oxygen therapy, 4 L/min  6. Recent saddle pulmonary emboli on chronic anticoagulation (Eliquis)  7. GERD  8. Spinal stenosis    PLAN:  Continue with the 3 times a week Zithromax as part of his home regimen  Continue with the Mucinex  Continue with the antitussive medication at night  Continue with the Flonase and the azelastine nasal spray  Follow-up on the bronchoscopy cultures  Hopefully ready for discharge by tomorrow      We will continue to follow    Discussed with patient And with the nursing staff  Disposition: Home    Katharina Salter MD  01/05/23  14:55 EST           Dictated utilizing Dragon dictation

## 2023-01-05 NOTE — THERAPY TREATMENT NOTE
Patient Name: Tony Leonard  : 1940    MRN: [id number]                              Today's Date: 2023       Admit Date: 2022    Visit Dx:     ICD-10-CM ICD-9-CM   1. Pneumonia of right upper lobe due to infectious organism  J18.9 486   2. Other acute pulmonary embolism without acute cor pulmonale ([facility])  I26.99 415.19   3. Acute on chronic congestive heart failure, unspecified heart failure type ([facility])  I50.9 428.0   4. COPD exacerbation ([facility])  J44.1 491.21     Patient Active Problem List   Diagnosis   • Thrush, oral   • ADD (attention deficit disorder)   • Hemorrhoids   • Bronchiectasis with acute lower respiratory infection ([facility])   • Diverticul disease small and large intestine, no perforati or abscess   • Benign non-nodular prostatic hyperplasia without lower urinary tract symptoms   • Gastroesophageal reflux disease   • Malaise and fatigue   • Environmental allergies   • Hemoptysis   • Dyslipidemia   • Primary osteoarthritis involving multiple joints   • Pneumonia of right lower lobe due to infectious organism   • Abnormal EKG   • COPD (chronic obstructive pulmonary disease) ([facility])   • Mild malnutrition ([facility])   • Acute on chronic respiratory failure with hypoxia ([facility])   • Fracture, intertrochanteric, right femur, closed, initial encounter ([facility])   • Chronic diastolic CHF (congestive heart failure) ([facility])   • Hematoma of frontal scalp   • Postoperative anemia due to acute blood loss   • Urinary retention   • Hemorrhagic disorder due to circulating anticoagulants ([facility])   • History of fracture of right hip   • Closed fracture of right hip with routine healing   • Chronic low back pain with sciatica   • Change in bowel function   • Rectal bleeding   • Prostate cancer ([facility])   • On home oxygen therapy   • Acute viral bronchitis   • Chronic diastolic (congestive) heart failure ([facility])   • Peripheral neuropathy   • Plantar fasciitis   • COPD exacerbation ([facility])   • Bilateral lower extremity edema   •  Microscopic hematuria   • Acute midline low back pain with right-sided sciatica   • Spinal stenosis, lumbar region with neurogenic claudication   • Compression deformity of vertebra   • Rectal bleed   • Esophagitis   • Angiectasia   • Hiatal hernia   • Overweight (BMI 25.0-29.9)   • Leukocytosis   • Bilateral hip pain   • Elevated troponin level not due myocardial infarction   • Nocturnal hypoxia   • Acute saddle pulmonary embolism without acute cor pulmonale ([facility]) - 12/11/2022   • Pneumonia of right upper lobe due to infectious organism     Past Medical History:   Diagnosis Date   • ADHD (attention deficit hyperactivity disorder)    • Bronchiectasis ([facility])    • CHF (congestive heart failure) ([facility])    • Colon polyp    • COPD (chronic obstructive pulmonary disease) ([facility])    • Diverticulosis    • Hard of hearing    • On home oxygen therapy     2 LITERS   • Pneumonia    • Prostate cancer ([facility]) 04/22/2019   • Spinal stenosis, lumbar region with neurogenic claudication 08/02/2022     Past Surgical History:   Procedure Laterality Date   • BRONCHOSCOPY N/A 4/30/2016    Procedure: BRONCHOSCOPY with BAL of right lower lobe and left  lower lobe.  ;  Surgeon: Nando Diaz MD;  Location: [facility];  Service:    • BRONCHOSCOPY N/A 4/28/2017    Procedure: BRONCHOSCOPY;  Surgeon: Katharina Salter MD;  Location: [facility] ENDOSCOPY;  Service:    • BRONCHOSCOPY Bilateral 6/29/2017    Procedure: BRONCHOSCOPY WITH WASHINGS;  Surgeon: Katharina Salter MD;  Location: [facility] ENDOSCOPY;  Service:    • BRONCHOSCOPY N/A 1/22/2018    Procedure: BRONCHOSCOPY AT BEDSIDE with BAL;  Surgeon: Katharina Salter MD;  Location: [facility] ENDOSCOPY;  Service:    • BRONCHOSCOPY N/A 1/30/2018    Procedure: BRONCHOSCOPY with BAL and washing;  Surgeon: Shreyas Wild MD;  Location: [facility] ENDOSCOPY;  Service:    • BRONCHOSCOPY Bilateral 4/24/2018    Procedure: BRONCHOSCOPY WITH WASHING;  Surgeon: Katharina Salter MD;  Location: [facility] ENDOSCOPY;  Service:  Pulmonary   • BRONCHOSCOPY N/A 12/28/2019    Procedure: BRONCHOSCOPY with bilateral washing;  Surgeon: Katharina Salter MD;  [facility];  Service: Pulmonary   • COLONOSCOPY  05/17/2013    eh, ih, tort, sig tics   • COLONOSCOPY N/A 5/10/2019    Non-thrombosed external hemorrhoids found on perianal exam, Diverticulosis, Tortuous colon, One 5 mm polyp in the mid ascending colon, IH. Path: Tubular adenoma.    • COLONOSCOPY N/A 9/24/2022    Procedure: COLONOSCOPY to cecum and TI with argon plasma coagulation.;  Surgeon: Bruce Black MD;  [facility];  Service: Gastroenterology;  Laterality: N/A;  pre- GI bleeding  post- radiation proctitis, diverticulosis   • ENDOSCOPY  03/19/2015    z line irreg, hh   • ENDOSCOPY N/A 9/24/2022    Procedure: ESOPHAGOGASTRODUODENOSCOPY;  Surgeon: Bruce Black MD;  [facility];  Service: Gastroenterology;  Laterality: N/A;  pre- GI bleeding  post- esophagitis, hiatal hernia   • HIP OPEN REDUCTION Right 1/17/2018    Procedure: HIP OPEN REDUCTION INTERNAL FIXATION WITH DYNAMIC HIP SCREW;  Surgeon: Les Black MD;  Location: [facility] MAIN OR;  Service:    • SPINE SURGERY     • TONSILLECTOMY     • TOTAL HIP ARTHROPLASTY REVISION Right 9/23/2019    Procedure: HIP  REVISION RIGHT;  Surgeon: Isauro Warner MD;  Location: [facility] MAIN OR;  Service: Orthopedics      General Information     Row Name 01/05/23 1454          OT Time and Intention    Document Type therapy note (daily note)  -CE     Mode of Treatment occupational therapy  -CE     Row Name 01/05/23 1454          General Information    Patient Profile Reviewed yes  -CE     Existing Precautions/Restrictions fall;oxygen therapy device and L/min  -CE     Row Name 01/05/23 1453          Cognition    Orientation Status (Cognition) oriented x 3  -CE     Row Name 01/05/23 1455          Safety Issues, Functional Mobility    Impairments Affecting Function (Mobility) balance;endurance/activity  tolerance;strength;shortness of breath;postural/trunk control  -CE           User Key  (r) = Recorded By, (t) = Taken By, (c) = Cosigned By    Initials Name Provider Type    CE Laura Maddox OT Occupational Therapist                 Mobility/ADL's     Row Name 01/05/23 1456          Bed Mobility    Bed Mobility supine-sit  -CE     Supine-Sit [place] (Bed Mobility) standby assist  -CE     Assistive Device (Bed Mobility) bed rails;head of bed elevated  -CE     Row Name 01/05/23 1456          Transfers    Transfers sit-stand transfer;bed-chair transfer;toilet transfer  -CE     Row Name 01/05/23 1456          Bed-Chair Transfer    Bed-Chair [place] (Transfers) contact guard;1 person assist  -CE     Assistive Device (Bed-Chair Transfers) walker, front-wheeled  -CE     Comment, (Bed-Chair Transfer) cues for attn to task  -     Row Name 01/05/23 1456          Sit-Stand Transfer    Sit-Stand [place] (Transfers) contact guard;verbal cues  -     Assistive Device (Sit-Stand Transfers) walker, front-wheeled  -CE     Row Name 01/05/23 1456          Toilet Transfer    [place] Level (Toilet Transfer) minimum assist (75% patient effort);1 person assist  -CE     Assistive Device (Toilet Transfer) walker, front-wheeled  -CE     Row Name 01/05/23 1456          Functional Mobility    Functional Mobility- Comment Pt able to ambulate ~100' with CGA x 1 and min verbal cues for attention to task and for safety with FWW. Pt taking brief standing rest about 1/2 way through and able to remain up in chair after mobility.  -CE     Row Name 01/05/23 1456          Activities of Daily Living    BADL Assessment/Intervention lower body dressing;toileting  -CE     Row Name 01/05/23 1456          Lower Body Dressing Assessment/Training    [place] Level (Lower Body Dressing) don;shoes/slippers;standby assist;set up  -CE     Position (Lower Body Dressing) edge of bed sitting  -CE     Row Name 01/05/23 1456           Toileting Assessment/Training    [place] Level (Toileting) perform perineal hygiene;supervision;set up  -CE     Comment, (Toileting) seated on standard height commode  -CE           User Key  (r) = Recorded By, (t) = Taken By, (c) = Cosigned By    Initials Name Provider Type    CE Laura Maddox OT Occupational Therapist               Obj/Interventions    No documentation.                Goals/Plan    No documentation.                Clinical Impression     Row Name 01/05/23 1500          Pain Assessment    Pretreatment Pain Rating 7/10  -CE     Posttreatment Pain Rating 7/10  -CE     Pain Location - Side/Orientation Left  -CE     Pain Location - hip  -CE     Pain Intervention(s) Repositioned;Rest  -CE     Row Name 01/05/23 1500          Plan of Care Review    Plan of Care Reviewed With patient  -CE     Progress improving  -CE     Outcome Evaluation Pt seen for co-tx by PT and OT this date. RN reporting pt was very unsteady earlier this date and with increased O2 requirement. Pt on 5Ls throughout session and tolerating mobility without desaturation on NC. Pt requiring Alejandra for toilet transer this date and reporting his toilet at home is higher and easier to manage. Able to complete mobility with CGA and FWW. Pt would like to return to home with HH at d/c. Will con't to follow for stated goals.  -CE     Row Name 01/05/23 1500          Therapy Assessment/Plan (OT)    Therapy Frequency (OT) 5 times/wk  -CE     Row Name 01/05/23 1500          Therapy Plan Review/Discharge Plan (OT)    Anticipated Discharge Disposition (OT) home with home health  -CE     Row Name 01/05/23 1500          Vital Signs    O2 Delivery Pre Treatment supplemental O2  -CE     O2 Delivery Intra Treatment supplemental O2  -CE     O2 Delivery Post Treatment supplemental O2  -CE     Pre Patient Position Supine  -CE     Intra Patient Position Standing  -CE     Post Patient Position Sitting  -CE     Row Name 01/05/23 1500          Positioning  and Restraints    Pre-Treatment Position in bed  -CE     Post Treatment Position chair  -CE     In Chair notified nsg;reclined;call light within reach;encouraged to call for assist;exit alarm on;legs elevated  -CE           User Key  (r) = Recorded By, (t) = Taken By, (c) = Cosigned By    Initials Name Provider Type    Laura Swartz OT Occupational Therapist               Outcome Measures     Row Name 01/05/23 1504          How much help from another is currently needed...    Putting on and taking off regular lower body clothing? 3  -CE     Bathing (including washing, rinsing, and drying) 3  -CE     Toileting (which includes using toilet bed pan or urinal) 3  -CE     Putting on and taking off regular upper body clothing 3  -CE     Taking care of personal grooming (such as brushing teeth) 3  -CE     Eating meals 4  -CE     AM-PAC 6 Clicks Score (OT) 19  -CE     Row Name 01/05/23 1058          How much help from another person do you currently need...    Turning from your back to your side while in flat bed without using bedrails? 4  -CS     Moving from lying on back to sitting on the side of a flat bed without bedrails? 3  -CS     Moving to and from a bed to a chair (including a wheelchair)? 3  -CS     Standing up from a chair using your arms (e.g., wheelchair, bedside chair)? 3  -CS     Climbing 3-5 steps with a railing? 2  -CS     To walk in hospital room? 3  -CS     AM-PAC 6 Clicks Score (PT) 18  -CS     Highest level of mobility 6 --> Walked 10 steps or more  -CS     Row Name 01/05/23 1504 01/05/23 1058       Functional Assessment    Outcome Measure Options AM-PAC 6 Clicks Daily Activity (OT)  -CE AM-PAC 6 Clicks Basic Mobility (PT)  -CS          User Key  (r) = Recorded By, (t) = Taken By, (c) = Cosigned By    Initials Name Provider Type    Destiny Silverman, PT Physical Therapist    Laura Swartz OT Occupational Therapist                Occupational Therapy Education     Title: PT OT SLP  Therapies (In Progress)     Topic: Occupational Therapy (In Progress)     Point: ADL training (Done)     Description:   Instruct learner(s) on proper safety adaptation and remediation techniques during self care or transfers.   Instruct in proper use of assistive devices.              Learning Progress Summary           Patient Acceptance, E, VU by  at 12/30/2022 7020    Comment: OT goals, pacing activity, PLB technique                   Point: Home exercise program (Not Started)     Description:   Instruct learner(s) on appropriate technique for monitoring, assisting and/or progressing therapeutic exercises/activities.              Learner Progress:  Not documented in this visit.          Point: Precautions (Not Started)     Description:   Instruct learner(s) on prescribed precautions during self-care and functional transfers.              Learner Progress:  Not documented in this visit.          Point: Body mechanics (Not Started)     Description:   Instruct learner(s) on proper positioning and spine alignment during self-care, functional mobility activities and/or exercises.              Learner Progress:  Not documented in this visit.                      User Key     Initials Effective Dates Name Provider Type Discipline     04/02/20 -  Madelyn Em OT Occupational Therapist OT              OT Recommendation and Plan  Therapy Frequency (OT): 5 times/wk  Plan of Care Review  Plan of Care Reviewed With: patient  Progress: improving  Outcome Evaluation: Pt seen for co-tx by PT and OT this date. RN reporting pt was very unsteady earlier this date and with increased O2 requirement. Pt on 5Ls throughout session and tolerating mobility without desaturation on NC. Pt requiring Alejandra for toilet transer this date and reporting his toilet at home is higher and easier to manage. Able to complete mobility with CGA and FWW. Pt would like to return to home with HH at d/c. Will con't to follow for stated goals.      Time Calculation:    Time Calculation- OT     Row Name 01/05/23 1505 01/05/23 1059          Time Calculation- OT    OT Start Time 0946  -CE --     OT Stop Time 1009  -CE --     OT Time Calculation (min) 23 min  -CE --     Total Timed Code Minutes- OT 23 minute(s)  -CE --     OT Received On 01/05/23  -CE --     OT - Next Appointment 01/06/23  -CE --        Timed Charges    02578 - Gait Training Minutes  -- 10  -CS     61137 - OT Therapeutic Activity Minutes 15  -CE --     52623 - OT Self Care/Mgmt Minutes 8  -CE --        Total Minutes    Timed Charges Total Minutes 23  -CE 10  -CS      Total Minutes 23  -CE 10  -CS           User Key  (r) = Recorded By, (t) = Taken By, (c) = Cosigned By    Initials Name Provider Type    Destiny Silverman, PT Physical Therapist    CE Laura Maddox, OT Occupational Therapist              Therapy Charges for Today     Code Description Service Date Service Provider Modifiers Qty    [id number]  OT THERAPEUTIC ACT EA 15 MIN 1/5/2023 Laura Maddox OT GO 1    [id number] HC OT SELF CARE/MGMT/TRAIN EA 15 MIN 1/5/2023 Laura Maddox OT GO 1               Laura Maddox OT  1/5/2023

## 2023-01-05 NOTE — PLAN OF CARE
Goal Outcome Evaluation:  Plan of Care Reviewed With: patient        Progress: improving  Outcome Evaluation: Pt received in bed upon arrival and agreeable to PT. Pt completed supine to sit with SBA. Pt completed B LE strengthening exercises x 10 prior to mobility. Pt stood from EOB requiring CGA. Pt ambulated to bathroom and completed a toilet transfer requiring min A. Pt ambulated a total of 100'c  RW requiring CGA. Pt improved as demonstrated with increased activity tolerance and less assistance. Pt more steady today and no overt LOB but requires cues for re-direction as pt distracts easily. Pt UIC at end of session with all needs in reach. Pt on 4.5-5L O2 this visit and O2 sats remained in the 90's with activity. Pt will continue to benefit from skilled PT to address strength and endurance.

## 2023-01-05 NOTE — PLAN OF CARE
Goal Outcome Evaluation:      O2 weaned to 2L NC. PRN cough meds given and effective. Rested well overnight. VSS.

## 2023-01-05 NOTE — THERAPY TREATMENT NOTE
Patient Name: Tony Leonard  : 1940    MRN: [id number]                              Today's Date: 2023       Admit Date: 2022    Visit Dx:     ICD-10-CM ICD-9-CM   1. Pneumonia of right upper lobe due to infectious organism  J18.9 486   2. Other acute pulmonary embolism without acute cor pulmonale ([facility])  I26.99 415.19   3. Acute on chronic congestive heart failure, unspecified heart failure type ([facility])  I50.9 428.0   4. COPD exacerbation ([facility])  J44.1 491.21     Patient Active Problem List   Diagnosis   • Thrush, oral   • ADD (attention deficit disorder)   • Hemorrhoids   • Bronchiectasis with acute lower respiratory infection ([facility])   • Diverticul disease small and large intestine, no perforati or abscess   • Benign non-nodular prostatic hyperplasia without lower urinary tract symptoms   • Gastroesophageal reflux disease   • Malaise and fatigue   • Environmental allergies   • Hemoptysis   • Dyslipidemia   • Primary osteoarthritis involving multiple joints   • Pneumonia of right lower lobe due to infectious organism   • Abnormal EKG   • COPD (chronic obstructive pulmonary disease) ([facility])   • Mild malnutrition ([facility])   • Acute on chronic respiratory failure with hypoxia ([facility])   • Fracture, intertrochanteric, right femur, closed, initial encounter ([facility])   • Chronic diastolic CHF (congestive heart failure) ([facility])   • Hematoma of frontal scalp   • Postoperative anemia due to acute blood loss   • Urinary retention   • Hemorrhagic disorder due to circulating anticoagulants ([facility])   • History of fracture of right hip   • Closed fracture of right hip with routine healing   • Chronic low back pain with sciatica   • Change in bowel function   • Rectal bleeding   • Prostate cancer ([facility])   • On home oxygen therapy   • Acute viral bronchitis   • Chronic diastolic (congestive) heart failure ([facility])   • Peripheral neuropathy   • Plantar fasciitis   • COPD exacerbation ([facility])   • Bilateral lower extremity edema   •  Microscopic hematuria   • Acute midline low back pain with right-sided sciatica   • Spinal stenosis, lumbar region with neurogenic claudication   • Compression deformity of vertebra   • Rectal bleed   • Esophagitis   • Angiectasia   • Hiatal hernia   • Overweight (BMI 25.0-29.9)   • Leukocytosis   • Bilateral hip pain   • Elevated troponin level not due myocardial infarction   • Nocturnal hypoxia   • Acute saddle pulmonary embolism without acute cor pulmonale ([facility]) - 12/11/2022   • Pneumonia of right upper lobe due to infectious organism     Past Medical History:   Diagnosis Date   • ADHD (attention deficit hyperactivity disorder)    • Bronchiectasis ([facility])    • CHF (congestive heart failure) ([facility])    • Colon polyp    • COPD (chronic obstructive pulmonary disease) ([facility])    • Diverticulosis    • Hard of hearing    • On home oxygen therapy     2 LITERS   • Pneumonia    • Prostate cancer ([facility]) 04/22/2019   • Spinal stenosis, lumbar region with neurogenic claudication 08/02/2022     Past Surgical History:   Procedure Laterality Date   • BRONCHOSCOPY N/A 4/30/2016    Procedure: BRONCHOSCOPY with BAL of right lower lobe and left  lower lobe.  ;  Surgeon: Nando Diaz MD;  Location: [facility];  Service:    • BRONCHOSCOPY N/A 4/28/2017    Procedure: BRONCHOSCOPY;  Surgeon: Katharina Salter MD;  Location: [facility] ENDOSCOPY;  Service:    • BRONCHOSCOPY Bilateral 6/29/2017    Procedure: BRONCHOSCOPY WITH WASHINGS;  Surgeon: Katharina Salter MD;  Location: [facility] ENDOSCOPY;  Service:    • BRONCHOSCOPY N/A 1/22/2018    Procedure: BRONCHOSCOPY AT BEDSIDE with BAL;  Surgeon: Katharina Salter MD;  Location: [facility] ENDOSCOPY;  Service:    • BRONCHOSCOPY N/A 1/30/2018    Procedure: BRONCHOSCOPY with BAL and washing;  Surgeon: Shreyas Wild MD;  Location: [facility] ENDOSCOPY;  Service:    • BRONCHOSCOPY Bilateral 4/24/2018    Procedure: BRONCHOSCOPY WITH WASHING;  Surgeon: Katharina Salter MD;  Location: [facility] ENDOSCOPY;  Service:  Pulmonary   • BRONCHOSCOPY N/A 12/28/2019    Procedure: BRONCHOSCOPY with bilateral washing;  Surgeon: Katharina Salter MD;  Location[facility];  Service: Pulmonary   • COLONOSCOPY  05/17/2013    eh, ih, tort, sig tics   • COLONOSCOPY N/A 5/10/2019    Non-thrombosed external hemorrhoids found on perianal exam, Diverticulosis, Tortuous colon, One 5 mm polyp in the mid ascending colon, IH. Path: Tubular adenoma.    • COLONOSCOPY N/A 9/24/2022    Procedure: COLONOSCOPY to cecum and TI with argon plasma coagulation.;  Surgeon: Bruce Black MD;  [facility];  Service: Gastroenterology;  Laterality: N/A;  pre- GI bleeding  post- radiation proctitis, diverticulosis   • ENDOSCOPY  03/19/2015    z line irreg, hh   • ENDOSCOPY N/A 9/24/2022    Procedure: ESOPHAGOGASTRODUODENOSCOPY;  Surgeon: Bruce Black MD;  [facility];  Service: Gastroenterology;  Laterality: N/A;  pre- GI bleeding  post- esophagitis, hiatal hernia   • HIP OPEN REDUCTION Right 1/17/2018    Procedure: HIP OPEN REDUCTION INTERNAL FIXATION WITH DYNAMIC HIP SCREW;  Surgeon: Les Black MD;  Location: [facility] MAIN OR;  Service:    • SPINE SURGERY     • TONSILLECTOMY     • TOTAL HIP ARTHROPLASTY REVISION Right 9/23/2019    Procedure: HIP  REVISION RIGHT;  Surgeon: Isauro Warner MD;  Location: [facility] MAIN OR;  Service: Orthopedics      General Information     Row Name 01/05/23 1053          Physical Therapy Time and Intention    Document Type therapy note (daily note)  -CS     Mode of Treatment physical therapy  -CS     Row Name 01/05/23 1053          General Information    Patient Profile Reviewed yes  -CS     Existing Precautions/Restrictions fall;oxygen therapy device and L/min  -CS     Row Name 01/05/23 1053          Cognition    Orientation Status (Cognition) oriented x 3  -CS     Row Name 01/05/23 1053          Safety Issues, Functional Mobility    Impairments Affecting Function (Mobility)  balance;endurance/activity tolerance;strength;shortness of breath;postural/trunk control  -           User Key  (r) = Recorded By, (t) = Taken By, (c) = Cosigned By    Initials Name Provider Type    Destiny Silverman PT Physical Therapist               Mobility     Row Name 01/05/23 1053          Bed Mobility    Bed Mobility supine-sit  -CS     Supine-Sit Cuming (Bed Mobility) standby assist  -CS     Assistive Device (Bed Mobility) bed rails;head of bed elevated  -CS     Comment, (Bed Mobility) [facility] at end of session  -     Row Name 01/05/23 1053          Sit-Stand Transfer    Sit-Stand Cuming (Transfers) contact guard;minimum assist (75% patient effort);verbal cues  -CS     Assistive Device (Sit-Stand Transfers) walker, front-wheeled  -CS     Comment, (Sit-Stand Transfer) x 2 (CGA from EOB; min A from toilet)  -     Row Name 01/05/23 1053          Gait/Stairs (Locomotion)    Cuming Level (Gait) contact guard;verbal cues  -CS     Assistive Device (Gait) walker, front-wheeled  -CS     Distance in Feet (Gait) 100'  -CS     Deviations/Abnormal Patterns (Gait) nomr decreased;gait speed decreased;stride length decreased  -CS     Bilateral Gait Deviations forward flexed posture;heel strike decreased  -CS     Comment, (Gait/Stairs) slow pace but no unsteadiness; cues for re-direction as pt distracts easily  -CS           User Key  (r) = Recorded By, (t) = Taken By, (c) = Cosigned By    Initials Name Provider Type    Destiny Silverman PT Physical Therapist               Obj/Interventions     Row Name 01/05/23 1054          Motor Skills    Therapeutic Exercise other (see comments)  B LE AP, LAQ, & seated marches  -     Row Name 01/05/23 1054          Balance    Balance Assessment sitting static balance;sitting dynamic balance;standing static balance;standing dynamic balance  -CS     Static Sitting Balance standby assist  -CS     Dynamic Sitting Balance standby assist  -CS     Position, Sitting  Balance unsupported;sitting edge of bed  -CS     Static Standing Balance contact guard  -CS     Dynamic Standing Balance contact guard  -CS     Position/Device Used, Standing Balance supported;walker, front-wheeled  -CS           User Key  (r) = Recorded By, (t) = Taken By, (c) = Cosigned By    Initials Name Provider Type    CS Destiny Santiago, PT Physical Therapist               Goals/Plan     Row Name 01/05/23 1058          Bed Mobility Goal 1 (PT)    Activity/Assistive Device (Bed Mobility Goal 1, PT) sit to supine;supine to sit  -CS     Sutton Level/Cues Needed (Bed Mobility Goal 1, PT) modified independence  -CS     Time Frame (Bed Mobility Goal 1, PT) 1 week  -CS     Row Name 01/05/23 1058          Transfer Goal 1 (PT)    Activity/Assistive Device (Transfer Goal 1, PT) sit-to-stand/stand-to-sit;bed-to-chair/chair-to-bed  -CS     Sutton Level/Cues Needed (Transfer Goal 1, PT) standby assist  -CS     Time Frame (Transfer Goal 1, PT) 1 week  -CS     Row Name 01/05/23 1058          Gait Training Goal 1 (PT)    Activity/Assistive Device (Gait Training Goal 1, PT) gait (walking locomotion);assistive device use;decrease fall risk;improve balance and speed  -CS     Sutton Level (Gait Training Goal 1, PT) standby assist  -CS     Time Frame (Gait Training Goal 1, PT) 1 week  -CS     Strategies/Barriers (Gait Training Goal 1, PT) 100'  -CS           User Key  (r) = Recorded By, (t) = Taken By, (c) = Cosigned By    Initials Name Provider Type    CS Destiny Santiago, PT Physical Therapist               Clinical Impression     Row Name 01/05/23 1055          Pain    Pretreatment Pain Rating 7/10  -CS     Posttreatment Pain Rating 7/10  -CS     Pain Location - Side/Orientation Left  -CS     Pain Location - hip  -CS     Pain Intervention(s) Rest;Repositioned;Ambulation/increased activity  -CS     Row Name 01/05/23 1054          Plan of Care Review    Plan of Care Reviewed With patient  -CS     Progress  improving  -CS     Outcome Evaluation Pt received in bed upon arrival and agreeable to PT. Pt completed supine to sit with SBA. Pt completed B LE strengthening exercises x 10 prior to mobility. Pt stood from EOB requiring CGA. Pt ambulated to bathroom and completed a toilet transfer requiring min A. Pt ambulated a total of 100'c  RW requiring CGA. Pt improved as demonstrated with increased activity tolerance and less assistance. Pt more steady today and no overt LOB but requires cues for re-direction as pt distracts easily. Pt UIC at end of session with all needs in reach. Pt on 4.5-5L O2 this visit and O2 sats remained in the 90's with activity. Pt will continue to benefit from skilled PT to address strength and endurance.  -CS     Row Name 01/05/23 1054          Therapy Assessment/Plan (PT)    Criteria for Skilled Interventions Met (PT) yes;meets criteria  -CS     Therapy Frequency (PT) 5 times/wk  -CS     Row Name 01/05/23 1054          Vital Signs    Pre SpO2 (%) 95  -CS     O2 Delivery Pre Treatment supplemental O2  -CS     Post SpO2 (%) 93  -CS     O2 Delivery Post Treatment supplemental O2  -CS     Row Name 01/05/23 1054          Positioning and Restraints    Pre-Treatment Position in bed  -CS     Post Treatment Position chair  -CS     In Chair reclined;call light within reach;encouraged to call for assist;exit alarm on;notified nsg  -CS           User Key  (r) = Recorded By, (t) = Taken By, (c) = Cosigned By    Initials Name Provider Type    CS Destiny Santiago, PT Physical Therapist               Outcome Measures     Row Name 01/05/23 1058          How much help from another person do you currently need...    Turning from your back to your side while in flat bed without using bedrails? 4  -CS     Moving from lying on back to sitting on the side of a flat bed without bedrails? 3  -CS     Moving to and from a bed to a chair (including a wheelchair)? 3  -CS     Standing up from a chair using your arms (e.g.,  wheelchair, bedside chair)? 3  -CS     Climbing 3-5 steps with a railing? 2  -CS     To walk in hospital room? 3  -CS     AM-PAC 6 Clicks Score (PT) 18  -CS     Highest level of mobility 6 --> Walked 10 steps or more  -CS     Row Name 01/05/23 1058          Functional Assessment    Outcome Measure Options AM-PAC 6 Clicks Basic Mobility (PT)  -CS           User Key  (r) = Recorded By, (t) = Taken By, (c) = Cosigned By    Initials Name Provider Type    CS Destiny Santiago, PT Physical Therapist                             Physical Therapy Education     Title: PT OT SLP Therapies (In Progress)     Topic: Physical Therapy (Done)     Point: Mobility training (Done)     Learning Progress Summary           Patient Acceptance, E,TB, VU,DU,NR by  at 1/5/2023 1058    Acceptance, E,TB, VU,DU,NR by  at 1/3/2023 1635    Acceptance, E,D, NR by  at 12/31/2022 1509    Acceptance, E, VU,NR by  at 12/30/2022 1634                   Point: Home exercise program (Done)     Learning Progress Summary           Patient Acceptance, E,TB, VU,DU,NR by  at 1/5/2023 1058    Acceptance, E,TB, VU,DU,NR by  at 1/3/2023 1635    Acceptance, E, VU,NR by  at 12/30/2022 1634                   Point: Body mechanics (Done)     Learning Progress Summary           Patient Acceptance, E,TB, VU,DU,NR by  at 1/5/2023 1058    Acceptance, E,TB, VU,DU,NR by  at 1/3/2023 1635    Acceptance, E,D, NR by  at 12/31/2022 1509    Acceptance, E, VU,NR by  at 12/30/2022 1634                   Point: Precautions (Done)     Learning Progress Summary           Patient Acceptance, E,TB, VU,DU,NR by  at 1/5/2023 1058    Acceptance, E,TB, VU,DU,NR by  at 1/3/2023 1635    Acceptance, E,D, NR by  at 12/31/2022 1509    Acceptance, E, VU,NR by  at 12/30/2022 1634                               User Key     Initials Effective Dates Name Provider Type Discipline     06/16/21 -  Ann Serna, PT Physical Therapist PT    PH 06/16/21 -   Terri Hubbard, SATISH Physical Therapist Assistant PT    CS 09/22/22 -  Destiny Santiago PT Physical Therapist PT              PT Recommendation and Plan     Plan of Care Reviewed With: patient  Progress: improving  Outcome Evaluation: Pt received in bed upon arrival and agreeable to PT. Pt completed supine to sit with SBA. Pt completed B LE strengthening exercises x 10 prior to mobility. Pt stood from EOB requiring CGA. Pt ambulated to bathroom and completed a toilet transfer requiring min A. Pt ambulated a total of 100'c  RW requiring CGA. Pt improved as demonstrated with increased activity tolerance and less assistance. Pt more steady today and no overt LOB but requires cues for re-direction as pt distracts easily. Pt UIC at end of session with all needs in reach. Pt on 4.5-5L O2 this visit and O2 sats remained in the 90's with activity. Pt will continue to benefit from skilled PT to address strength and endurance.     Time Calculation:    PT Charges     Row Name 01/05/23 1059             Time Calculation    Start Time 0945  -CS      Stop Time 1005  -CS      Time Calculation (min) 20 min  -CS      PT Received On 01/05/23  -      PT - Next Appointment 01/06/23  -      PT Goal Re-Cert Due Date 01/12/23  -         Time Calculation- PT    Total Timed Code Minutes- PT 18 minute(s)  -CS         Timed Charges    71780 - Gait Training Minutes  10  -CS      44015 - PT Therapeutic Activity Minutes 8  -CS         Total Minutes    Timed Charges Total Minutes 18  -CS       Total Minutes 18  -CS            User Key  (r) = Recorded By, (t) = Taken By, (c) = Cosigned By    Initials Name Provider Type    CS Destiny Santiago, PT Physical Therapist              Therapy Charges for Today     Code Description Service Date Service Provider Modifiers Qty    [id number] HC GAIT TRAINING EA 15 MIN 1/5/2023 Destiny Santiago, PT GP 1          PT G-Codes  Outcome Measure Options: AM-PAC 6 Clicks Basic Mobility (PT)  AM-PAC 6  Clicks Score (PT): 18  AM-PAC 6 Clicks Score (OT): 17  PT Discharge Summary  Anticipated Discharge Disposition (PT): home with assist, home with home health    Dsetiny Santiago, PT  1/5/2023

## 2023-01-06 ENCOUNTER — READMISSION MANAGEMENT (OUTPATIENT)
Dept: CALL CENTER | Facility: HOSPITAL | Age: 83
End: 2023-01-06
Payer: MEDICARE

## 2023-01-06 VITALS
WEIGHT: 176 LBS | BODY MASS INDEX: 24.64 KG/M2 | SYSTOLIC BLOOD PRESSURE: 119 MMHG | RESPIRATION RATE: 16 BRPM | DIASTOLIC BLOOD PRESSURE: 59 MMHG | HEART RATE: 89 BPM | OXYGEN SATURATION: 94 % | TEMPERATURE: 98.2 F | HEIGHT: 71 IN

## 2023-01-06 PROBLEM — I26.92 ACUTE SADDLE PULMONARY EMBOLISM WITHOUT ACUTE COR PULMONALE (HCC): Status: RESOLVED | Noted: 2022-12-11 | Resolved: 2023-01-06

## 2023-01-06 LAB
ANION GAP SERPL CALCULATED.3IONS-SCNC: 9.6 MMOL/L (ref 5–15)
BACTERIA SPEC RESP CULT: NORMAL
BASOPHILS # BLD AUTO: 0.07 10*3/MM3 (ref 0–0.2)
BASOPHILS NFR BLD AUTO: 0.7 % (ref 0–1.5)
BUN SERPL-MCNC: 28 MG/DL (ref 8–23)
BUN/CREAT SERPL: 33.3 (ref 7–25)
CALCIUM SPEC-SCNC: 8.8 MG/DL (ref 8.6–10.5)
CHLORIDE SERPL-SCNC: 96 MMOL/L (ref 98–107)
CO2 SERPL-SCNC: 31.4 MMOL/L (ref 22–29)
CREAT SERPL-MCNC: 0.84 MG/DL (ref 0.76–1.27)
DEPRECATED RDW RBC AUTO: 43 FL (ref 37–54)
EGFRCR SERPLBLD CKD-EPI 2021: 86 ML/MIN/1.73
EOSINOPHIL # BLD AUTO: 1.36 10*3/MM3 (ref 0–0.4)
EOSINOPHIL NFR BLD AUTO: 12.7 % (ref 0.3–6.2)
ERYTHROCYTE [DISTWIDTH] IN BLOOD BY AUTOMATED COUNT: 12.5 % (ref 12.3–15.4)
GLUCOSE SERPL-MCNC: 103 MG/DL (ref 65–99)
GRAM STN SPEC: NORMAL
HCT VFR BLD AUTO: 36 % (ref 37.5–51)
HGB BLD-MCNC: 12.4 G/DL (ref 13–17.7)
IMM GRANULOCYTES # BLD AUTO: 0.02 10*3/MM3 (ref 0–0.05)
IMM GRANULOCYTES NFR BLD AUTO: 0.2 % (ref 0–0.5)
LYMPHOCYTES # BLD AUTO: 2.59 10*3/MM3 (ref 0.7–3.1)
LYMPHOCYTES NFR BLD AUTO: 24.2 % (ref 19.6–45.3)
MCH RBC QN AUTO: 32.5 PG (ref 26.6–33)
MCHC RBC AUTO-ENTMCNC: 34.4 G/DL (ref 31.5–35.7)
MCV RBC AUTO: 94.5 FL (ref 79–97)
MONOCYTES # BLD AUTO: 1.14 10*3/MM3 (ref 0.1–0.9)
MONOCYTES NFR BLD AUTO: 10.7 % (ref 5–12)
NEUTROPHILS NFR BLD AUTO: 5.51 10*3/MM3 (ref 1.7–7)
NEUTROPHILS NFR BLD AUTO: 51.5 % (ref 42.7–76)
NRBC BLD AUTO-RTO: 0 /100 WBC (ref 0–0.2)
PLATELET # BLD AUTO: 363 10*3/MM3 (ref 140–450)
PMV BLD AUTO: 9.1 FL (ref 6–12)
POTASSIUM SERPL-SCNC: 3.5 MMOL/L (ref 3.5–5.2)
RBC # BLD AUTO: 3.81 10*6/MM3 (ref 4.14–5.8)
SODIUM SERPL-SCNC: 137 MMOL/L (ref 136–145)
WBC NRBC COR # BLD: 10.69 10*3/MM3 (ref 3.4–10.8)

## 2023-01-06 PROCEDURE — 80048 BASIC METABOLIC PNL TOTAL CA: CPT | Performed by: STUDENT IN AN ORGANIZED HEALTH CARE EDUCATION/TRAINING PROGRAM

## 2023-01-06 PROCEDURE — 94669 MECHANICAL CHEST WALL OSCILL: CPT

## 2023-01-06 PROCEDURE — 94799 UNLISTED PULMONARY SVC/PX: CPT

## 2023-01-06 PROCEDURE — 94761 N-INVAS EAR/PLS OXIMETRY MLT: CPT

## 2023-01-06 PROCEDURE — 94664 DEMO&/EVAL PT USE INHALER: CPT

## 2023-01-06 PROCEDURE — 97530 THERAPEUTIC ACTIVITIES: CPT

## 2023-01-06 PROCEDURE — 85025 COMPLETE CBC W/AUTO DIFF WBC: CPT | Performed by: STUDENT IN AN ORGANIZED HEALTH CARE EDUCATION/TRAINING PROGRAM

## 2023-01-06 PROCEDURE — 94668 MNPJ CHEST WALL SBSQ: CPT

## 2023-01-06 RX ORDER — AZELASTINE 1 MG/ML
2 SPRAY, METERED NASAL 2 TIMES DAILY
Qty: 30 ML | Refills: 0 | Status: SHIPPED | OUTPATIENT
Start: 2023-01-06

## 2023-01-06 RX ORDER — AZITHROMYCIN 250 MG/1
250 TABLET, FILM COATED ORAL 3 TIMES WEEKLY
Qty: 15 TABLET | Refills: 0 | Status: SHIPPED | OUTPATIENT
Start: 2023-01-06 | End: 2023-01-31 | Stop reason: HOSPADM

## 2023-01-06 RX ORDER — FLUTICASONE PROPIONATE 50 MCG
2 SPRAY, SUSPENSION (ML) NASAL DAILY
Qty: 11.1 ML | Refills: 1 | Status: SHIPPED | OUTPATIENT
Start: 2023-01-07

## 2023-01-06 RX ADMIN — ALBUTEROL SULFATE 2.5 MG: 2.5 SOLUTION RESPIRATORY (INHALATION) at 03:47

## 2023-01-06 RX ADMIN — POTASSIUM CHLORIDE 20 MEQ: 750 TABLET, EXTENDED RELEASE ORAL at 09:18

## 2023-01-06 RX ADMIN — AZELASTINE HYDROCHLORIDE 2 SPRAY: 137 SPRAY, METERED NASAL at 09:17

## 2023-01-06 RX ADMIN — BUDESONIDE 0.5 MG: 0.5 INHALANT ORAL at 07:13

## 2023-01-06 RX ADMIN — APIXABAN 10 MG: 5 TABLET, FILM COATED ORAL at 09:17

## 2023-01-06 RX ADMIN — ALBUTEROL SULFATE 2.5 MG: 2.5 SOLUTION RESPIRATORY (INHALATION) at 15:33

## 2023-01-06 RX ADMIN — ALBUTEROL SULFATE 2.5 MG: 2.5 SOLUTION RESPIRATORY (INHALATION) at 11:42

## 2023-01-06 RX ADMIN — ACETAMINOPHEN 500 MG: 500 TABLET, FILM COATED ORAL at 17:51

## 2023-01-06 RX ADMIN — PANTOPRAZOLE SODIUM 40 MG: 40 TABLET, DELAYED RELEASE ORAL at 09:17

## 2023-01-06 RX ADMIN — ACETAMINOPHEN 500 MG: 500 TABLET, FILM COATED ORAL at 09:29

## 2023-01-06 RX ADMIN — GUAIFENESIN 600 MG: 600 TABLET, EXTENDED RELEASE ORAL at 09:17

## 2023-01-06 RX ADMIN — AZITHROMYCIN DIHYDRATE 250 MG: 250 TABLET, FILM COATED ORAL at 09:17

## 2023-01-06 RX ADMIN — GABAPENTIN 300 MG: 300 CAPSULE ORAL at 09:17

## 2023-01-06 RX ADMIN — Medication 10 ML: at 09:17

## 2023-01-06 RX ADMIN — ACETYLCYSTEINE 3 ML: 200 SOLUTION ORAL; RESPIRATORY (INHALATION) at 11:45

## 2023-01-06 RX ADMIN — GABAPENTIN 300 MG: 300 CAPSULE ORAL at 17:49

## 2023-01-06 RX ADMIN — ALBUTEROL SULFATE 2.5 MG: 2.5 SOLUTION RESPIRATORY (INHALATION) at 07:10

## 2023-01-06 RX ADMIN — FLUTICASONE PROPIONATE 2 SPRAY: 50 SPRAY, METERED NASAL at 09:17

## 2023-01-06 RX ADMIN — SODIUM CHLORIDE SOLN NEBU 7% 4 ML: 7 NEBU SOLN at 07:17

## 2023-01-06 NOTE — THERAPY TREATMENT NOTE
Patient Name: Tony Leonard  : 1940    MRN: [id number]                              Today's Date: 2023       Admit Date: 2022    Visit Dx:     ICD-10-CM ICD-9-CM   1. Pneumonia of right upper lobe due to infectious organism  J18.9 486   2. Other acute pulmonary embolism without acute cor pulmonale ([facility])  I26.99 415.19   3. Acute on chronic congestive heart failure, unspecified heart failure type ([facility])  I50.9 428.0   4. COPD exacerbation ([facility])  J44.1 491.21     Patient Active Problem List   Diagnosis   • Thrush, oral   • ADD (attention deficit disorder)   • Hemorrhoids   • Bronchiectasis with acute lower respiratory infection ([facility])   • Diverticul disease small and large intestine, no perforati or abscess   • Benign non-nodular prostatic hyperplasia without lower urinary tract symptoms   • Gastroesophageal reflux disease   • Malaise and fatigue   • Environmental allergies   • Hemoptysis   • Dyslipidemia   • Primary osteoarthritis involving multiple joints   • Pneumonia of right lower lobe due to infectious organism   • Abnormal EKG   • COPD (chronic obstructive pulmonary disease) ([facility])   • Mild malnutrition ([facility])   • Acute on chronic respiratory failure with hypoxia ([facility])   • Fracture, intertrochanteric, right femur, closed, initial encounter ([facility])   • Chronic diastolic CHF (congestive heart failure) ([facility])   • Hematoma of frontal scalp   • Postoperative anemia due to acute blood loss   • Urinary retention   • Hemorrhagic disorder due to circulating anticoagulants ([facility])   • History of fracture of right hip   • Closed fracture of right hip with routine healing   • Chronic low back pain with sciatica   • Change in bowel function   • Rectal bleeding   • Prostate cancer ([facility])   • On home oxygen therapy   • Acute viral bronchitis   • Chronic diastolic (congestive) heart failure ([facility])   • Peripheral neuropathy   • Plantar fasciitis   • COPD exacerbation ([facility])   • Bilateral lower extremity edema   •  Microscopic hematuria   • Acute midline low back pain with right-sided sciatica   • Spinal stenosis, lumbar region with neurogenic claudication   • Compression deformity of vertebra   • Rectal bleed   • Esophagitis   • Angiectasia   • Hiatal hernia   • Overweight (BMI 25.0-29.9)   • Leukocytosis   • Bilateral hip pain   • Elevated troponin level not due myocardial infarction   • Nocturnal hypoxia   • Pneumonia of right upper lobe due to infectious organism     Past Medical History:   Diagnosis Date   • ADHD (attention deficit hyperactivity disorder)    • Bronchiectasis (HCC)    • CHF (congestive heart failure) ([facility])    • Colon polyp    • COPD (chronic obstructive pulmonary disease) ([facility])    • Diverticulosis    • Hard of hearing    • On home oxygen therapy     2 LITERS   • Pneumonia    • Prostate cancer ([facility]) 04/22/2019   • Spinal stenosis, lumbar region with neurogenic claudication 08/02/2022     Past Surgical History:   Procedure Laterality Date   • BRONCHOSCOPY N/A 4/30/2016    Procedure: BRONCHOSCOPY with BAL of right lower lobe and left  lower lobe.  ;  Surgeon: Nando Diaz MD;  Location: [facility] ENDOSCOPY;  Service:    • BRONCHOSCOPY N/A 4/28/2017    Procedure: BRONCHOSCOPY;  Surgeon: Katharina Salter MD;  Location: [facility] ENDOSCOPY;  Service:    • BRONCHOSCOPY Bilateral 6/29/2017    Procedure: BRONCHOSCOPY WITH WASHINGS;  Surgeon: Katharina Salter MD;  Location: [facility] ENDOSCOPY;  Service:    • BRONCHOSCOPY N/A 1/22/2018    Procedure: BRONCHOSCOPY AT BEDSIDE with BAL;  Surgeon: Katharina Salter MD;  Location: [facility] ENDOSCOPY;  Service:    • BRONCHOSCOPY N/A 1/30/2018    Procedure: BRONCHOSCOPY with BAL and washing;  Surgeon: Shreyas Wild MD;  Location: [facility] ENDOSCOPY;  Service:    • BRONCHOSCOPY Bilateral 4/24/2018    Procedure: BRONCHOSCOPY WITH WASHING;  Surgeon: Katharina Salter MD;  Location: [facility] ENDOSCOPY;  Service: Pulmonary   • BRONCHOSCOPY N/A 12/28/2019    Procedure: BRONCHOSCOPY with bilateral  washing;  Surgeon: Katharina Salter MD;  Location: [facility] ENDOSCOPY;  Service: Pulmonary   • COLONOSCOPY  05/17/2013    eh, ih, tort, sig tics   • COLONOSCOPY N/A 5/10/2019    Non-thrombosed external hemorrhoids found on perianal exam, Diverticulosis, Tortuous colon, One 5 mm polyp in the mid ascending colon, IH. Path: Tubular adenoma.    • COLONOSCOPY N/A 9/24/2022    Procedure: COLONOSCOPY to cecum and TI with argon plasma coagulation.;  Surgeon: Bruce Black MD;  Location: [facility] ENDOSCOPY;  Service: Gastroenterology;  Laterality: N/A;  pre- GI bleeding  post- radiation proctitis, diverticulosis   • ENDOSCOPY  03/19/2015    z line irreg, hh   • ENDOSCOPY N/A 9/24/2022    Procedure: ESOPHAGOGASTRODUODENOSCOPY;  Surgeon: Bruce Black MD;  Location: [facility] ENDOSCOPY;  Service: Gastroenterology;  Laterality: N/A;  pre- GI bleeding  post- esophagitis, hiatal hernia   • HIP OPEN REDUCTION Right 1/17/2018    Procedure: HIP OPEN REDUCTION INTERNAL FIXATION WITH DYNAMIC HIP SCREW;  Surgeon: Les Black MD;  Location: [facility] MAIN OR;  Service:    • SPINE SURGERY     • TONSILLECTOMY     • TOTAL HIP ARTHROPLASTY REVISION Right 9/23/2019    Procedure: HIP  REVISION RIGHT;  Surgeon: Isauro Warner MD;  Location: [facility] MAIN OR;  Service: Orthopedics      General Information     Row Name 01/06/23 1250          Physical Therapy Time and Intention    Document Type therapy note (daily note)  -CS     Mode of Treatment individual therapy;physical therapy  -CS     Row Name 01/06/23 1250          General Information    Patient Profile Reviewed yes  -CS     Existing Precautions/Restrictions fall;oxygen therapy device and L/min  -CS     Row Name 01/06/23 1250          Cognition    Orientation Status (Cognition) oriented x 3  -CS     Row Name 01/06/23 1250          Safety Issues, Functional Mobility    Impairments Affecting Function (Mobility) balance;endurance/activity tolerance;strength;shortness of breath;postural/trunk  control  -CS           User Key  (r) = Recorded By, (t) = Taken By, (c) = Cosigned By    Initials Name Provider Type    CS Destiny Santiago PT Physical Therapist               Mobility     Row Name 01/06/23 1250          Bed Mobility    Bed Mobility supine-sit  -CS     Supine-Sit [place] (Bed Mobility) standby assist  -CS     Assistive Device (Bed Mobility) bed rails;head of bed elevated  -CS     Comment, (Bed Mobility) UI at end of session  -CS     Row Name 01/06/23 1250          Sit-Stand Transfer    Sit-Stand [place] (Transfers) standby assist  -CS     Assistive Device (Sit-Stand Transfers) walker, front-wheeled  -CS     Row Name 01/06/23 1250          Gait/Stairs (Locomotion)    [place] Level (Gait) contact guard;verbal cues  -CS     Assistive Device (Gait) walker, front-wheeled  -CS     Distance in Feet (Gait) 100'  -CS     Deviations/Abnormal Patterns (Gait) norm decreased;gait speed decreased;stride length decreased  -CS     Bilateral Gait Deviations forward flexed posture;heel strike decreased  -CS     [place] Level (Stairs) not tested  -CS     Comment, (Gait/Stairs) slow pace but no unsyeadiness; forward flexed posture with cues provided for upright stability  -CS           User Key  (r) = Recorded By, (t) = Taken By, (c) = Cosigned By    Initials Name Provider Type    Destiny Silverman PT Physical Therapist               Obj/Interventions     Row Name 01/06/23 1251          Balance    Balance Assessment sitting static balance;sitting dynamic balance;standing static balance;standing dynamic balance  -CS     Static Sitting Balance standby assist  -CS     Dynamic Sitting Balance standby assist  -CS     Position, Sitting Balance unsupported;sitting edge of bed  -CS     Static Standing Balance contact guard  -CS     Dynamic Standing Balance contact guard  -CS     Position/Device Used, Standing Balance supported;walker, front-wheeled  -CS           User Key  (r) = Recorded By, (t) =  Taken By, (c) = Cosigned By    Initials Name Provider Type    CS Destiny Santiago, PT Physical Therapist               Goals/Plan    No documentation.                Clinical Impression     Row Name 01/06/23 1251          Pain    Pretreatment Pain Rating 8/10  -CS     Posttreatment Pain Rating 8/10  -CS     Pain Location - Side/Orientation Bilateral  -CS     Pain Location - hip  -CS     Pain Intervention(s) Rest;Repositioned;Ambulation/increased activity  -CS     Row Name 01/06/23 1251          Plan of Care Review    Plan of Care Reviewed With patient  -CS     Progress improving  -CS     Outcome Evaluation Pt received in bed upon arrival and agreeable to PT. Pt completed supine to sit with SBA. Pt denied any reports of dizziness. Pt stood, ambulated to bathroom and then in hallway c RW requiring CGA. Pt presens with slow pace but no unsteadiness. Pt with a forward flexed posture but this is baseline. Pt ambulated on 4L O2 with O2 sats at 96% after ambulating. Pt UIC at end of session with all needs in reach. Pt will continue to benefit from skilled PT to address strength and endurance.  -CS     Row Name 01/06/23 1251          Therapy Assessment/Plan (PT)    Criteria for Skilled Interventions Met (PT) yes;meets criteria  -CS     Therapy Frequency (PT) 5 times/wk  -CS     Row Name 01/06/23 1251          Positioning and Restraints    Pre-Treatment Position in bed  -CS     Post Treatment Position chair  -CS     In Chair reclined;call light within reach;encouraged to call for assist;exit alarm on  -CS           User Key  (r) = Recorded By, (t) = Taken By, (c) = Cosigned By    Initials Name Provider Type    Destiny Silverman, PT Physical Therapist               Outcome Measures     Row Name 01/06/23 1254          How much help from another person do you currently need...    Turning from your back to your side while in flat bed without using bedrails? 4  -CS     Moving from lying on back to sitting on the side of a flat  bed without bedrails? 3  -CS     Moving to and from a bed to a chair (including a wheelchair)? 3  -CS     Standing up from a chair using your arms (e.g., wheelchair, bedside chair)? 3  -CS     Climbing 3-5 steps with a railing? 2  -CS     To walk in hospital room? 3  -CS     AM-PAC 6 Clicks Score (PT) 18  -CS     Highest level of mobility 6 --> Walked 10 steps or more  -CS     Row Name 01/06/23 1254          Functional Assessment    Outcome Measure Options AM-PAC 6 Clicks Basic Mobility (PT)  -CS           User Key  (r) = Recorded By, (t) = Taken By, (c) = Cosigned By    Initials Name Provider Type    CS Destiny Santiago, PT Physical Therapist                             Physical Therapy Education     Title: PT OT SLP Therapies (In Progress)     Topic: Physical Therapy (Done)     Point: Mobility training (Done)     Learning Progress Summary           Patient Acceptance, E,TB, VU,DU by  at 1/6/2023 1254    Acceptance, E,TB, VU,DU,NR by  at 1/5/2023 1058    Acceptance, E,TB, VU,DU,NR by  at 1/3/2023 1635    Acceptance, E,D, NR by  at 12/31/2022 1509    Acceptance, E, VU,NR by  at 12/30/2022 1634                   Point: Home exercise program (Done)     Learning Progress Summary           Patient Acceptance, E,TB, VU,DU by  at 1/6/2023 1254    Acceptance, E,TB, VU,DU,NR by  at 1/5/2023 1058    Acceptance, E,TB, VU,DU,NR by  at 1/3/2023 1635    Acceptance, E, VU,NR by  at 12/30/2022 1634                   Point: Body mechanics (Done)     Learning Progress Summary           Patient Acceptance, E,TB, VU,DU by CS at 1/6/2023 1254    Acceptance, E,TB, VU,DU,NR by  at 1/5/2023 1058    Acceptance, E,TB, VU,DU,NR by  at 1/3/2023 1635    Acceptance, E,D, NR by  at 12/31/2022 1509    Acceptance, E, VU,NR by  at 12/30/2022 1634                   Point: Precautions (Done)     Learning Progress Summary           Patient Acceptance, E,TB, VU,DU by CS at 1/6/2023 1254    Acceptance, E,TB, VU,DU,NR by CS at  1/5/2023 1058    Acceptance, E,TB, VU,DU,NR by  at 1/3/2023 1635    Acceptance, E,D, NR by  at 12/31/2022 1509    Acceptance, E, VU,NR by  at 12/30/2022 1634                               User Key     Initials Effective Dates Name Provider Type Discipline     06/16/21 -  Ann Serna, PT Physical Therapist PT     06/16/21 -  Terri Hubbard PTA Physical Therapist Assistant PT     09/22/22 -  Destiny Santiago PT Physical Therapist PT              PT Recommendation and Plan     Plan of Care Reviewed With: patient  Progress: improving  Outcome Evaluation: Pt received in bed upon arrival and agreeable to PT. Pt completed supine to sit with SBA. Pt denied any reports of dizziness. Pt stood, ambulated to bathroom and then in hallway c RW requiring CGA. Pt presens with slow pace but no unsteadiness. Pt with a forward flexed posture but this is baseline. Pt ambulated on 4L O2 with O2 sats at 96% after ambulating. Pt UIC at end of session with all needs in reach. Pt will continue to benefit from skilled PT to address strength and endurance.     Time Calculation:    PT Charges     Row Name 01/06/23 1254             Time Calculation    Start Time 1019  -CS      Stop Time 1038  -      Time Calculation (min) 19 min  -CS      PT Received On 01/06/23  -      PT - Next Appointment 01/09/23  -         Time Calculation- PT    Total Timed Code Minutes- PT 18 minute(s)  -CS         Timed Charges    85965 - PT Therapeutic Activity Minutes 18  -CS         Total Minutes    Timed Charges Total Minutes 18  -CS       Total Minutes 18  -CS            User Key  (r) = Recorded By, (t) = Taken By, (c) = Cosigned By    Initials Name Provider Type    CS Destiny Santiago, PT Physical Therapist              Therapy Charges for Today     Code Description Service Date Service Provider Modifiers Qty    [id number] HC GAIT TRAINING EA 15 MIN 1/5/2023 Destiny Santiago, PT GP 1    [id number] HC PT THERAPEUTIC ACT EA 15  MIN 1/6/2023 Destiny Santiago, PT GP 1          PT G-Codes  Outcome Measure Options: AM-PAC 6 Clicks Basic Mobility (PT)  AM-PAC 6 Clicks Score (PT): 18  AM-PAC 6 Clicks Score (OT): 19  PT Discharge Summary  Anticipated Discharge Disposition (PT): home with assist, home with home health    Destiny Santiago, STEFF  1/6/2023

## 2023-01-06 NOTE — DISCHARGE SUMMARY
Patient Name: Tony Leonard  : 1940  MRN: [id number]    Date of Admission: 2022  Date of Discharge:  2023  Primary Care Physician: Erich Aviles MD      Chief Complaint:   Shortness of Breath      Discharge Diagnoses     Active Hospital Problems    Diagnosis  POA   • **Acute on chronic respiratory failure with hypoxia (HCC) [J96.21]  Yes   • Pneumonia of right upper lobe due to infectious organism [J18.9]  Yes   • Elevated troponin level not due myocardial infarction [R77.8]  Yes   • Spinal stenosis, lumbar region with neurogenic claudication [M48.062]  Yes   • COPD exacerbation (HCC) [J44.1]  Yes   • Chronic diastolic (congestive) heart failure (HCC) [I50.32]  Yes   • On home oxygen therapy [Z99.81]  Not Applicable   • Prostate cancer (HCC) [C61]  Yes   • COPD (chronic obstructive pulmonary disease) (HCC) [J44.9]  Yes   • ADD (attention deficit disorder) [F98.8]  Yes      Resolved Hospital Problems    Diagnosis Date Resolved POA   • Acute saddle pulmonary embolism without acute cor pulmonale (HCC) - 2022 [I26.92] 2023 Yes        Hospital Course     Mr. Leonard is a 82 y.o. male with a history of COPD and bronchiectasis, prostate CA and recent admission with saddle PE who presented to [facility] initially complaining of shortness of breath.  Please see the admitting history and physical for further details.  He was found to have some mild acute on chronic resp failure. He was started on scheduled nebs and pulmonary saw in consultation. Pulm was consulted and performed bronchoscopy with removal of some mucous plugs. He has continued to progress and is now close to baseline o2 requirements. Recommended to continue mucinex and thrice weekly Zithromax.      Day of Discharge     Subjective:  No new complaints    Physical Exam:  Temp:  [97.4 °F (36.3 °C)-99.1 °F (37.3 °C)] 98.4 °F (36.9 °C)  Heart Rate:  [73-97] 86  Resp:  [16-20] 18  BP: (109-128)/(59-67)  128/64  Body mass index is 24.55 kg/m².  Physical Exam  Constitutional:       General: He is not in acute distress.     Appearance: He is ill-appearing.      Comments: Chronically ill   Cardiovascular:      Rate and Rhythm: Normal rate and regular rhythm.      Heart sounds: No murmur heard.  Pulmonary:      Effort: Pulmonary effort is normal. No respiratory distress.      Breath sounds: Rhonchi (scattered) present.   Musculoskeletal:         General: No tenderness.      Right lower leg: No edema.      Left lower leg: No edema.   Skin:     General: Skin is warm and dry.   Neurological:      General: No focal deficit present.      Mental Status: He is alert and oriented to person, place, and time. Mental status is at baseline.      Motor: No weakness.         Consultants     Consult Orders (all) (From admission, onward)     Start     Ordered    12/30/22 1931  Inpatient Pulmonology Consult  Once        Specialty:  Pulmonary Disease  Provider:  Katharina Salter MD    12/30/22 1931 12/30/22 1931  Inpatient Infectious Diseases Consult  Once,   Status:  Canceled        Specialty:  Infectious Diseases  Provider:  Anish Aragon MD    12/30/22 1931 12/29/22 1358  [facility] (on-call MD unless specified) Details  Once        Specialty:  Hospitalist  Provider:  Danni Frye MD    12/29/22 1357              Procedures     BRONCHOSCOPY with lavage      Imaging Results (All)     Procedure Component Value Units Date/Time    XR Chest 1 View [[id number]] Collected: 12/29/22 1232     Updated: 12/29/22 1237    Narrative:      XR CHEST 1 VW-     HISTORY: Male who is 82 years-old,  short of breath     TECHNIQUE: Frontal view of the chest     COMPARISON: 12 /9/2022     FINDINGS: Heart, mediastinum and pulmonary vasculature are unremarkable.  Hazy opacity at the right apex may represent infiltrate, follow-up  recommended. No pleural effusion, or pneumothorax. No acute osseous  process.       Impression:      Hazy opacity at the  right apex, follow-up recommended.     This report was finalized on 12/29/2022 12:34 PM by Dr. Tj Evans M.D.             Results for orders placed during the hospital encounter of 12/09/22    Adult Transthoracic Echo Complete W/ Cont if Necessary Per Protocol    Interpretation Summary  •  Left ventricular ejection fraction appears to be 61 - 65%.  •  Left ventricular diastolic function was normal.  •  Estimated right ventricular systolic pressure from tricuspid regurgitation is normal (<35 mmHg).  •  Mild dilation of the ascending aorta is present.    Pertinent Labs     Results from last 7 days   Lab Units 01/06/23  0742 01/05/23  1105 01/04/23  0656 01/03/23  0628   WBC 10*3/mm3 10.69 9.91 8.84 9.03   HEMOGLOBIN g/dL 12.4* 12.2* 11.4* 12.2*   PLATELETS 10*3/mm3 363 322 314 293     Results from last 7 days   Lab Units 01/06/23  0742 01/05/23  1104 01/04/23  0656 01/03/23  1726 01/03/23  0628   SODIUM mmol/L 137 133* 135*  --  136   POTASSIUM mmol/L 3.5 3.3* 4.4 4.4 3.5   CHLORIDE mmol/L 96* 93* 100  --  95*   CO2 mmol/L 31.4* 30.6* 28.0  --  28.6   BUN mg/dL 28* 34* 29*  --  29*   CREATININE mg/dL 0.84 1.07 0.91  --  0.89   GLUCOSE mg/dL 103* 105* 101*  --  107*   EGFR mL/min/1.73 86.0 68.4 83.1  --  84.5       Results from last 7 days   Lab Units 01/06/23  0742 01/05/23  1104 01/04/23  0656 01/03/23  0628   CALCIUM mg/dL 8.8 9.0 8.9 8.6               Invalid input(s): LDLCALC  Results from last 7 days   Lab Units 01/04/23  0916 01/02/23  0554   RESPCX  Scant growth (1+) Normal respiratory cyndee. No S. aureus or Pseudomonas aeruginosa detected. Final report. Scant growth (1+) Normal respiratory cyndee. No S. aureus or Pseudomonas aeruginosa detected. Final report.     Results from last 7 days   Lab Units 12/31/22  1142   COVID19  Not Detected       Test Results Pending at Discharge       Discharge Details        Discharge Medications      New Medications      Instructions Start Date   azelastine 0.1 %  nasal spray  Commonly known as: ASTELIN   2 sprays, Nasal, 2 Times Daily, Use in each nostril as directed      azithromycin 250 MG tablet  Commonly known as: ZITHROMAX   250 mg, Oral, 3 Times Weekly, M/W/F         Continue These Medications      Instructions Start Date   acetaminophen 500 MG tablet  Commonly known as: TYLENOL   500 mg, Oral, Every 6 Hours PRN      albuterol (2.5 MG/3ML) 0.083% nebulizer solution  Commonly known as: PROVENTIL   2.5 mg, Nebulization, Every 6 Hours PRN      apixaban 5 MG tablet tablet  Commonly known as: ELIQUIS   5 mg, Oral, Every 12 Hours Scheduled      budesonide 180 MCG/ACT inhaler  Commonly known as: PULMICORT   2 puffs, Inhalation, 2 Times Daily      calcium carbonate 500 MG chewable tablet  Commonly known as: TUMS   1 tablet, Oral, As Needed      gabapentin 300 MG capsule  Commonly known as: NEURONTIN   300 mg, Oral, 3 Times Daily      guaifenesin 100 MG/5ML liquid  Commonly known as: ROBITUSSIN   200 mg, Oral, Every 4 Hours PRN      methylphenidate 10 MG tablet  Commonly known as: RITALIN   10 mg, Oral, Daily      multivitamin with minerals tablet tablet   1 tablet, Oral, Daily      pantoprazole 40 MG EC tablet  Commonly known as: PROTONIX   40 mg, Oral, Daily      potassium chloride 20 MEQ CR tablet  Commonly known as: K-DUR,KLOR-CON   1 tablet, Oral, Daily      sodium chloride 7 % nebulizer solution nebulizer solution   1 vial, Inhalation, 2 Times Daily      terbinafine 250 MG tablet  Commonly known as: lamiSIL   250 mg, Oral, Daily         ASK your doctor about these medications      Instructions Start Date   fluticasone 50 MCG/ACT nasal spray  Commonly known as: FLONASE  Ask about: Which instructions should I use?   2 sprays, Nasal, Daily, Administer 2 sprays in each nostril for each dose.      fluticasone 50 MCG/ACT nasal spray  Commonly known as: FLONASE  Ask about: Which instructions should I use?   2 sprays, Each Nare, Daily   Start Date: January 7, 2023     rOPINIRole  0.5 MG tablet  Commonly known as: REQUIP   0.5 mg, Oral, Nightly, Take 1 hour before bedtime.             Allergies   Allergen Reactions   • Daliresp [Roflumilast] Anaphylaxis   • Latex Rash   • Sulfa Antibiotics Rash       Discharge Disposition:  Home or Self Care      Discharge Diet:  Diet Order   Procedures   • Diet: Cardiac Diets; Healthy Heart (2-3 Na+); Texture: Regular Texture (IDDSI 7); Fluid Consistency: Thin (IDDSI 0)       Discharge Activity:       CODE STATUS:    Code Status and Medical Interventions:   Ordered at: 12/29/22 1625     Code Status (Patient has no pulse and is not breathing):    CPR (Attempt to Resuscitate)     Medical Interventions (Patient has pulse or is breathing):    Full Support       Future Appointments   Date Time Provider Department Center   1/16/2023  2:30 PM Yulia Delacruz APRN [facility] [address] [facility]   1/17/2023  9:30 AM Rowdy Mattson MD [facility]     Additional Instructions for the Follow-ups that You Need to Schedule     Discharge Follow-up with PCP   As directed       Currently Documented PCP:    Erich Aviles MD    PCP Phone Number:    [phone]     Follow Up Details: 1-2 weeks            Contact information for follow-up providers     Erich Aviles MD .    Specialty: Internal Medicine  Why: 1-2 weeks  Contact information:  [address] [facility] [address]  [facility] [address]  [phone]                   Contact information for after-discharge care     Home Medical Care     [facility] .    Service: Home Health Services  Contact information:  [address] [facility] [address]  [facility] [address]  [phone]                             Additional Instructions for the Follow-ups that You Need to Schedule     Discharge Follow-up with PCP   As directed       Currently Documented PCP:    Erich Aviles MD    PCP Phone Number:    [phone]     Follow Up Details: 1-2 weeks           Time Spent on Discharge:  Greater than 30  minutes      Juan Sinclair MD  [facility]  01/06/23  12:11 EST

## 2023-01-06 NOTE — PROGRESS NOTES
"  PROGRESS NOTE  Patient Name: Tony Leonard  Age/Sex: 82 y.o. male  : 1940  MRN: [id number]    Date of Admission: 2022  Date of Encounter Visit: 23   LOS: 7 days   Patient Care Team:  Erich Aviles MD as PCP - General (Internal Medicine)  Katharina Salter MD as Consulting Physician (Pulmonary Disease)  Anum Bhatt MD as Consulting Physician (Pain Medicine)  Azeb Cook RN as Ambulatory  ([facility])  Otilia Shepard MSW as  (Amb Case Mgmt) ([facility])    Chief Complaint: Acute on chronic respiratory failure, COPD, recent pulmonary embolus, edema    Hospital course: Patient had a bronchoscopy on 2022 and we were able to retrieve some large mucous plugs.  He is afebrile, he is on 3 L nasal cannula and can be weaned further.  He is afebrile the cough is better, he is wheezing less, he is afebrile.        REVIEW OF SYSTEMS:   CONSTITUTIONAL: no fever or chills  CARDIOVASCULAR: No chest pain, chest pressure or chest discomfort. No palpitations, positive edema, improving.   RESPIRATORY: Less cough and wheezing  GASTROINTESTINAL: No anorexia, nausea, vomiting or diarrhea. No abdominal pain or blood.   HEMATOLOGIC: No bleeding or bruising.     Ventilator/Non-Invasive Ventilation Settings (From admission, onward)    3 L/min            Vital Signs  Temp:  [97.4 °F (36.3 °C)-99.1 °F (37.3 °C)] 98.4 °F (36.9 °C)  Heart Rate:  [73-97] 86  Resp:  [16-20] 18  BP: (109-128)/(59-67) 128/64  SpO2:  [92 %-98 %] 94 %  on  Flow (L/min):  [2-3.5] 3 Device (Oxygen Therapy): nasal cannula    Intake/Output Summary (Last 24 hours) at 2023 1214  Last data filed at 2023 0736  Gross per 24 hour   Intake 120 ml   Output 850 ml   Net -730 ml     Flowsheet Rows    Flowsheet Row First Filed Value   Admission Height 180.3 cm (71\") Documented at 2022 1136   Admission Weight 79.8 kg (176 lb) Documented at 2022 1136        Body mass index is 24.55 " kg/m².      12/29/22  1136   Weight: 79.8 kg (176 lb)       Physical Exam:  GEN:  No acute distress, alert, cooperative, well developed   EYES:   Sclerae clear. No icterus. PERRL. Normal EOM  ENT:   External ears/nose normal, no oral lesions, no thrush, mucous membranes moist  NECK:  Supple, midline trachea, no JVD  LUNGS: Normal chest on inspection, improved breath sounds, less wheezing today. Respirations regular, even and unlabored.   CV:  Regular rhythm and rate. Normal S1/S2. No murmurs, gallops, or rubs noted.  ABD:  Soft, nontender and nondistended. Normal bowel sounds. No guarding  EXT:  Moves all extremities well. No cyanosis. No redness.  Positive minimal residual lower extremity edema but improving  Skin: Dry, intact, no bleeding    Results Review:    Results From Last 14 Days   Lab Units 12/29/22  1204   LACTATE mmol/L 1.4     Results from last 7 days   Lab Units 01/06/23  0742 01/05/23  1104 01/04/23  0656 01/03/23  1726 01/03/23  0628 01/02/23  0811 01/01/23  2358 01/01/23  1136 12/31/22  0822   SODIUM mmol/L 137 133* 135*  --  136 137  --  135* 139   POTASSIUM mmol/L 3.5 3.3* 4.4 4.4 3.5 4.2 4.5 3.4* 3.0*   CHLORIDE mmol/L 96* 93* 100  --  95* 98  --  96* 98   CO2 mmol/L 31.4* 30.6* 28.0  --  28.6 27.5  --  29.0 29.4*   BUN mg/dL 28* 34* 29*  --  29* 29*  --  33* 27*   CREATININE mg/dL 0.84 1.07 0.91  --  0.89 0.80  --  0.96 1.21   CALCIUM mg/dL 8.8 9.0 8.9  --  8.6 9.0  --  8.8 8.5*   ANION GAP mmol/L 9.6 9.4 7.0  --  12.4 11.5  --  10.0 11.6                 Results from last 7 days   Lab Units 01/06/23  0742 01/05/23  1105 01/04/23  0656 01/03/23  0628 01/02/23  0811 01/01/23  1136 12/31/22  0823   WBC 10*3/mm3 10.69 9.91 8.84 9.03 12.38* 9.31 9.36   HEMOGLOBIN g/dL 12.4* 12.2* 11.4* 12.2* 12.1* 12.2* 12.1*   HEMATOCRIT % 36.0* 36.1* 34.1* 36.8* 36.0* 35.7* 34.9*   PLATELETS 10*3/mm3 363 322 314 293 259 250 238   MCV fL 94.5 97.8* 96.1 97.4* 95.5 94.7 91.6   NEUTROPHIL % % 51.5 56.3 46.2 50.2 57.2  59.0 59.4   LYMPHOCYTE % % 24.2 17.1* 21.4 21.9 18.6* 17.0* 16.5*   MONOCYTES % % 10.7 13.5* 11.2 10.7 9.8 12.2* 12.9*   EOSINOPHIL % % 12.7* 12.1* 19.9* 16.3* 13.7* 11.1* 10.6*   BASOPHIL % % 0.7 0.8 1.0 0.7 0.3 0.4 0.4   IMM GRAN % % 0.2 0.2 0.3 0.2 0.4 0.3 0.2                   Invalid input(s): LDLCALC          No results found for: POCGLU  Results from last 7 days   Lab Units 12/31/22  0822   PROCALCITONIN ng/mL 0.16     Results from last 7 days   Lab Units 01/04/23  0916 01/02/23  0554   RESPCX  Scant growth (1+) Normal respiratory cyndee. No S. aureus or Pseudomonas aeruginosa detected. Final report. Scant growth (1+) Normal respiratory cyndee. No S. aureus or Pseudomonas aeruginosa detected. Final report.         Results from last 7 days   Lab Units 12/31/22  1142   COVID19  Not Detected   ADENOVIRUS DETECTION BY PCR  Not Detected   CORONAVIRUS 229E  Not Detected   CORONAVIRUS HKU1  Not Detected   CORONAVIRUS NL63  Not Detected   CORONAVIRUS OC43  Not Detected   HUMAN METAPNEUMOVIRUS  Not Detected   HUMAN RHINOVIRUS/ENTEROVIRUS  Detected*   INFLUENZA B PCR  Not Detected   PARAINFLUENZA 1  Not Detected   PARAINFLUENZA VIRUS 2  Not Detected   PARAINFLUENZA VIRUS 3  Not Detected   PARAINFLUENZA VIRUS 4  Not Detected   BORDETELLA PERTUSSIS PCR  Not Detected   BORDETELLA PARAPERTUSSIS PCR  Not Detected   CHLAMYDOPHILA PNEUMONIAE PCR  Not Detected   MYCOPLAMA PNEUMO PCR  Not Detected   RSV, PCR  Not Detected               Imaging:   Imaging Results (All)     Procedure Component Value Units Date/Time          I reviewed the patient's new clinical results.  I personally viewed and interpreted the patient's imaging results:        Medication Review:   acetylcysteine, 3 mL, Nebulization, BID - RT  albuterol, 2.5 mg, Nebulization, Q4H - RT  apixaban, 10 mg, Oral, Q12H   Followed by  [START ON 1/9/2023] apixaban, 5 mg, Oral, Q12H  azelastine, 2 spray, Each Nare, BID  azithromycin, 250 mg, Oral, Once per day on Mon Wed  Fri  budesonide, 0.5 mg, Nebulization, BID - RT  fluticasone, 2 spray, Each Nare, Daily  gabapentin, 300 mg, Oral, TID  guaiFENesin, 600 mg, Oral, Q12H  methylphenidate, 10 mg, Oral, Daily  pantoprazole, 40 mg, Oral, Daily  potassium chloride, 20 mEq, Oral, Daily  rOPINIRole, 0.5 mg, Oral, Nightly  sodium chloride, 10 mL, Intravenous, Q12H  sodium chloride, 10 mL, Intravenous, Q12H  sodium chloride, 4 mL, Nebulization, BID - RT        sodium chloride, 30 mL/hr, Last Rate: 30 mL/hr (01/04/23 0842)        ASSESSMENT:   1. Acute on chronic hypoxic respiratory failure  2. Infiltrate of right upper lobe  3. 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 History of recurrent mucous plugging  5. Home oxygen therapy, 4 L/min  6. Recent saddle pulmonary emboli on chronic anticoagulation (Eliquis)  7. GERD  8. Spinal stenosis    PLAN:  Continue with the 3 times a week Zithromax as part of his home regimen  Continue with the Mucinex  Cleared for discharge  Resume his home nebulizer regimen        Discussed with patient And with primary team  Disposition: Home    Katharina Salter MD  01/06/23  12:14 EST           Dictated utilizing Dragon dictation

## 2023-01-06 NOTE — PLAN OF CARE
Goal Outcome Evaluation:  Plan of Care Reviewed With: patient        Progress: improving  Outcome Evaluation: Pt received in bed upon arrival and agreeable to PT. Pt completed supine to sit with SBA. Pt denied any reports of dizziness. Pt stood, ambulated to bathroom and then in hallway c RW requiring CGA. Pt presens with slow pace but no unsteadiness. Pt with a forward flexed posture but this is baseline. Pt ambulated on 4L O2 with O2 sats at 96% after ambulating. Pt UIC at end of session with all needs in reach. Pt will continue to benefit from skilled PT to address strength and endurance.

## 2023-01-06 NOTE — CASE MANAGEMENT/SOCIAL WORK
Continued Stay Note  [facility]     Patient Name: Tony Leonard  MRN: [id number]  Today's Date: 1/6/2023    Admit Date: 12/29/2022    Plan: Return to [place] at [place] with [facility] following.   Discharge Plan     Row Name 01/06/23 1456       Plan    Plan Comments DC orders in EPIC.  Spoke with patient at bedside.  He confirms that he plans to return home with [facility] and is not interested in SNF.  States that he will call him son @ 1700 to see if he can pick him up.  He does not want CCP to call his son and disturb him at work.  Cab voucher left with nurse in case transport needed later this evening. [facility] notified of dc. Rose RUFF RN               Discharge Codes    No documentation.               Expected Discharge Date and Time     Expected Discharge Date Expected Discharge Time    Jan 6, 2023             Rose Castaneda RN

## 2023-01-06 NOTE — PLAN OF CARE
Goal Outcome Evaluation:  Plan of Care Reviewed With: patient        Progress: improving  Outcome Evaluation: Patient  with  frequent  coughing  episodes,  productive  at  times.  Continues  q 4 hour  Neb treatments.   Still  appears  weak.  No  complaints  of  pain voiced  so  far this  shift.   Oxygen  per  nasal  cannula  at 2l/m.   SR  on  the    monitor.  Possible  discharge  today.  Nursing  will  continue  to monitor

## 2023-01-07 NOTE — OUTREACH NOTE
Prep Survey    Flowsheet Row Responses   [place] facility patient discharged from? [place]   Is LACE score < 7 ? No   Eligibility [facility]   Date of Admission 12/29/22   Date of Discharge 01/06/23   Discharge Disposition Home or Self Care   Discharge diagnosis a/c Resp failure/PNA-bronchoscopy this visit   Does the patient have one of the following disease processes/diagnoses(primary or secondary)? Pneumonia   Does the patient have Home health ordered? Yes   What is the Home health agency?  [facility]   Is there a DME ordered? No   Prep survey completed? Yes          MICHELLE VELOZ - Registered Nurse

## 2023-01-08 NOTE — CASE MANAGEMENT/SOCIAL WORK
Case Management Discharge Note      Final Note: DC'd [place]6 to [place] at [facility] with [facility] following             Home Medical Care Coordination complete.    Service Provider Selected Services Address Phone Fax Patient Preferred    [facility] [address] [address] [phone] --                Selected Continued Care - Episodes Includes continued care and service providers with selected services from the active episodes listed below    High Risk Care Management Episode start date: 12/13/2022   There are no active outsourced providers for this episode.             Selected Continued Care - Prior Encounters Includes continued care and service providers with selected services from prior encounters from 9/30/2022 to 1/6/2023    Discharged on 12/12/2022 Admission date: 12/9/2022 - Discharge disposition: Home or Self Care    Home Medical Care     Service Provider Selected Services Address Phone Fax Patient Preferred    [facility] [address] [address] [phone] --                Discharged on 12/1/2022 Admission date: 11/26/2022 - Discharge disposition: Skilled Nursing Facility (DC - External)    Destination     Service Provider Selected Services Address Phone Fax Patient Preferred    [facility] [address] [address] [phone] --                Discharged on 11/23/2022 Admission date: 11/21/2022 - Discharge disposition: Skilled Nursing Facility (DC - External)    Destination     Service Provider Selected Services Address Phone Fax Patient Preferred    [facility] [address] [address] [phone] --                Discharged on 11/19/2022 Admission date: 11/13/2022 - Discharge disposition: Home-Health Care c    Home Medical  Care     Service Provider Selected Services Address Phone Fax Patient Preferred    [facility] [address] [address] [phone] --                    Transportation Services  Private: Car    Final Discharge Disposition Code: 06 - home with home health care

## 2023-01-09 ENCOUNTER — TRANSITIONAL CARE MANAGEMENT TELEPHONE ENCOUNTER (OUTPATIENT)
Dept: CALL CENTER | Facility: HOSPITAL | Age: 83
End: 2023-01-09
Payer: MEDICARE

## 2023-01-09 NOTE — OUTREACH NOTE
Call Center TCM Note    Flowsheet Row Responses   [facility] patient discharged from? [place]   Does the patient have one of the following disease processes/diagnoses(primary or secondary)? Pneumonia   TCM attempt successful? Yes   Call start time 0914   Call end time 0920   Discharge diagnosis a/c Resp failure/PNA-bronchoscopy this visit   Person spoke with today (if not patient) and relationship diogo Finch   Meds reviewed with patient/caregiver? Yes   Is the patient having any side effects they believe may be caused by any medication additions or changes? No   Does the patient have all medications ordered at discharge? Yes   Is the patient taking all medications as directed (includes completed medication regime)? Yes   Does the patient have an appointment with their PCP within 7 days of discharge? Yes  [1/11/23 at 1:45 PM]   Pulse Ox monitoring None   Psychosocial issues? Yes   Psychosocial comments diogo Finch, states pt is starting to get more forgetful, and has to remind pt to take medication,  Stef is working on getting pt into assistant living   Did the patient receive a copy of their discharge instructions? Yes   Nursing interventions Reviewed instructions with patient   What is the patient's perception of their health status since discharge? Same   Nursing Interventions Nurse provided patient education   If the patient is a current smoker, are they able to teach back resources for cessation? Not a smoker   Is the patient/caregiver able to teach back the hierarchy of who to call/visit for symptoms/problems? PCP, Specialist, Home health nurse, Urgent Care, ED, 911 Yes   Is the patient able to teach back COPD zones? Yes   Nursing interventions Education provided on various zones   Patient reports what zone on this call? Yellow Zone   Yellow Zone I have less energy for my daily activities, More breathless than usual   Yellow interventions Use oxygen as prescribed, Use quick relief inhaler, Continue  taking daily medications, Get plenty of rest, Call provider immediately if symptoms don't improve: they may indicate that an adjustment in medication or oxygen therapy is needed   Is the patient/caregiver able to teach back signs and symptoms of worsening condition: Fever/chills, Shortness of breath, Chest pain   Is the patient/caregiver able to teach back importance of completing antibiotic course of treatment? Yes   TCM call completed? Yes   Wrap up additional comments Stef, son, states pt is still struggling with SOB, and does not feel pt is using inhalers. Stef advised to remind pt to use inhalers to help with SOB. Reviewed AVS/medications with Stef. Stef is working on trying to get pt into assitant living. Stef verified PCP fu appt on 1/11/23.   Call end time 0920   Would this patient benefit from a Referral to Amb Social Work? No   Is the patient interested in additional calls from an ambulatory ?  NOTE:  applies to high risk patients requiring additional follow-up. No          Kamilah Lantigua RN    1/9/2023, 09:25 EST

## 2023-01-13 NOTE — PROGRESS NOTES
"Enter Query Response Below      Query Response:     Patient likely with viral pneumonia related to rhinovirus/enterovirus.  Antibiotics given during hospital stay and at discharge are part of home regimen for severe COPD.         If applicable, please update the problem list.        Patient: Tony Leonard        : 1940  Account: [id number]           Admit Date: 2022        How to Respond to this query:       a. Click New Note     b. Answer query within the yellow box.                c. Update the Problem List, if applicable.      If you have any questions about this query contact me at: [email or web address]     Dr. Sinclair,    Patient presented on .  Respiratory panel performed on  was positive for Human Rhinovirus/Enterovirus.  Chest x-ray performed on  showed \"hazy opacity at the right apex.\"  Pulmonology consultation dated  includes, \"... the chest x-ray showed very faint infiltrate in the right upper lobe with no obvious consolidation.  Patient was treated with:  IV Zosyn    PO azithromycin , ,   Discharge summary includes diagnosis of right upper lobe pneumonia present on admission.    Can this be further clarified as:    - viral pneumonia  - pneumonia was ruled out  - other ________________________  - clinically indeterminable    By submitting this query, we are merely seeking further clarification of documentation to accurately reflect all conditions that you are monitoring, evaluating, treating or that extend the hospitalization or utilize additional resources of care. Please utilize your independent clinical judgment when addressing the question(s) above.     This query and your response, once completed, will be entered into the legal medical record.    Sincerely,  Franca Pritchard RN CCDS [facility]  Clinical Documentation Integrity Program     "

## 2023-01-16 ENCOUNTER — OFFICE VISIT (OUTPATIENT)
Dept: ORTHOPEDIC SURGERY | Facility: CLINIC | Age: 83
End: 2023-01-16
Payer: MEDICARE

## 2023-01-16 VITALS — BODY MASS INDEX: 24.64 KG/M2 | TEMPERATURE: 97.1 F | HEIGHT: 71 IN | WEIGHT: 176 LBS

## 2023-01-16 DIAGNOSIS — G89.29 CHRONIC LOW BACK PAIN WITH SCIATICA, SCIATICA LATERALITY UNSPECIFIED, UNSPECIFIED BACK PAIN LATERALITY: Primary | ICD-10-CM

## 2023-01-16 DIAGNOSIS — M48.062 SPINAL STENOSIS, LUMBAR REGION WITH NEUROGENIC CLAUDICATION: ICD-10-CM

## 2023-01-16 DIAGNOSIS — M54.40 CHRONIC LOW BACK PAIN WITH SCIATICA, SCIATICA LATERALITY UNSPECIFIED, UNSPECIFIED BACK PAIN LATERALITY: Primary | ICD-10-CM

## 2023-01-16 PROCEDURE — 99213 OFFICE O/P EST LOW 20 MIN: CPT | Performed by: NURSE PRACTITIONER
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
## 2023-01-17 ENCOUNTER — READMISSION MANAGEMENT (OUTPATIENT)
Dept: CALL CENTER | Facility: HOSPITAL | Age: 83
End: 2023-01-17
Payer: MEDICARE

## 2023-01-17 NOTE — OUTREACH NOTE
COPD/PN Week 2 Survey    Flowsheet Row Responses   [facility] patient discharged from? [place]   Does the patient have one of the following disease processes/diagnoses(primary or secondary)? Pneumonia   Week 2 attempt successful? Yes   Call start time 1135   Call end time 1139   Discharge diagnosis a/c Resp failure/PNA-bronchoscopy this visit   Meds reviewed with patient/caregiver? Yes   Is the patient having any side effects they believe may be caused by any medication additions or changes? No   Does the patient have all medications ordered at discharge? Yes   Is the patient taking all medications as directed (includes completed medication regime)? Yes   Does the patient have a primary care provider?  Yes   Does the patient have an appointment with their PCP or specialist within 7 days of discharge? No   Comments regarding PCP PATIENT STATES HE CANNOT GO TO SEE DR. BOYD BECAUSE HE DOESN'T HAVE TRANSPORTATION TO APPOINTMENTS. PATIENT GIVEN PHONE NUMBER FOR [facility].    What is preventing the patient from scheduling follow up appointments within 7 days of discharge? Transportation   Nursing Interventions Educated patient on importance of making appointment   Has the patient kept scheduled appointments due by today? N/A   What is the Home health agency?  [facility]    Has home health visited the patient within 72 hours of discharge? Yes   Pulse Ox monitoring Intermittent   Pulse Ox device source Patient   O2 Sat: education provided Sat levels, Monitoring frequency, When to seek care   Psychosocial issues? Yes   Psychosocial comments PATIENT STATES DOES NOT HAVE TRANSPORTATION TO APPOINTMENTS. PATIENT PROVIDED WITH PHONE NUMBER FOR [facility].    Notified Case Management Psychosocial (Non Urgent)   Did the patient receive a copy of their discharge instructions? Yes   Nursing interventions Reviewed instructions with patient   What is the patient's perception of their health status  "since discharge? Worsening  [PATIENT WAS VERY SOA ON THE PHONE. HE STATES [facility] NURSE SAW HIM THIS MORNING AND ENCOURAGED HIM TO GO TO THE HOSPITAL, BUT HE REFUSED. HE STATES HE IS NOT DOING WELL. THIS NURSE ALSO ADVISED HIM TO GO TO THE HOSPITAL, BUT HE REFUSED. ]   If the patient is a current smoker, are they able to teach back resources for cessation? Not a smoker   Is the patient/caregiver able to teach back the hierarchy of who to call/visit for symptoms/problems? PCP, Specialist, Home health nurse, Urgent Care, ED, 911 Yes   Additional teach back comments PATIENT VERY SOA AND ENCOURAGED TO GO TO THE HOSPITAL, BUT HE REFUSED STATING, \"IF I GET WORSE, I CAN CALL 911.\" THIS NURSE LEFT A VM FOR PATIENT'S SON REGARDING THIS CALL.    Is the patient/caregiver able to teach back signs and symptoms of worsening condition: Fever/chills, Shortness of breath, Chest pain   Is the patient/caregiver able to teach back importance of completing antibiotic course of treatment? Yes   Week 2 call completed? Yes   Is the patient interested in additional calls from an ambulatory ?  NOTE:  applies to high risk patients requiring additional follow-up. Yes          KWABENA GUILLAUME - Licensed Nurse  "

## 2023-01-18 ENCOUNTER — PATIENT OUTREACH (OUTPATIENT)
Dept: CASE MANAGEMENT | Facility: OTHER | Age: 83
End: 2023-01-18
Payer: MEDICARE
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
## 2023-01-26 RX ORDER — FLUOXETINE HYDROCHLORIDE 20 MG/1
CAPSULE ORAL
Qty: 90 CAPSULE | Refills: 3 | Status: SHIPPED | OUTPATIENT
Start: 2023-01-26

## 2023-01-28 ENCOUNTER — HOSPITAL ENCOUNTER (INPATIENT)
Facility: HOSPITAL | Age: 83
LOS: 2 days | Discharge: HOME HEALTH | DRG: 190 | End: 2023-01-31
Attending: EMERGENCY MEDICINE | Admitting: HOSPITALIST
Payer: MEDICARE

## 2023-01-28 DIAGNOSIS — I21.4 NSTEMI (NON-ST ELEVATED MYOCARDIAL INFARCTION): Primary | ICD-10-CM

## 2023-01-28 DIAGNOSIS — Z98.890 HISTORY OF BACK SURGERY: ICD-10-CM

## 2023-01-28 DIAGNOSIS — M54.16 LUMBAR RADICULOPATHY: ICD-10-CM

## 2023-01-28 DIAGNOSIS — J18.9 PNEUMONIA OF RIGHT UPPER LOBE DUE TO INFECTIOUS ORGANISM: ICD-10-CM

## 2023-01-28 DIAGNOSIS — I50.32 CHRONIC DIASTOLIC CHF (CONGESTIVE HEART FAILURE): ICD-10-CM

## 2023-01-28 PROCEDURE — 99285 EMERGENCY DEPT VISIT HI MDM: CPT

## 2023-01-29 ENCOUNTER — APPOINTMENT (OUTPATIENT)
Dept: GENERAL RADIOLOGY | Facility: HOSPITAL | Age: 83
DRG: 190 | End: 2023-01-29
Payer: MEDICARE

## 2023-01-29 PROBLEM — I21.4 NSTEMI (NON-ST ELEVATED MYOCARDIAL INFARCTION): Status: ACTIVE | Noted: 2023-01-29

## 2023-01-29 PROBLEM — J96.11 CHRONIC RESPIRATORY FAILURE WITH HYPOXIA: Status: ACTIVE | Noted: 2023-01-29

## 2023-01-29 LAB
ALBUMIN SERPL-MCNC: 3.4 G/DL (ref 3.5–5.2)
ALBUMIN/GLOB SERPL: 1.4 G/DL
ALP SERPL-CCNC: 61 U/L (ref 39–117)
ALT SERPL W P-5'-P-CCNC: 15 U/L (ref 1–41)
ANION GAP SERPL CALCULATED.3IONS-SCNC: 10.3 MMOL/L (ref 5–15)
ANION GAP SERPL CALCULATED.3IONS-SCNC: 9.5 MMOL/L (ref 5–15)
APTT PPP: 109.2 SECONDS (ref 22.7–35.4)
APTT PPP: 29.5 SECONDS (ref 22.7–35.4)
AST SERPL-CCNC: 25 U/L (ref 1–40)
B PARAPERT DNA SPEC QL NAA+PROBE: NOT DETECTED
B PERT DNA SPEC QL NAA+PROBE: NOT DETECTED
BACTERIA SPEC RESP CULT: NORMAL
BASOPHILS # BLD AUTO: 0.07 10*3/MM3 (ref 0–0.2)
BASOPHILS NFR BLD AUTO: 0.8 % (ref 0–1.5)
BILIRUB SERPL-MCNC: 0.4 MG/DL (ref 0–1.2)
BUN SERPL-MCNC: 16 MG/DL (ref 8–23)
BUN SERPL-MCNC: 19 MG/DL (ref 8–23)
BUN/CREAT SERPL: 21.1 (ref 7–25)
BUN/CREAT SERPL: 23.2 (ref 7–25)
C PNEUM DNA NPH QL NAA+NON-PROBE: NOT DETECTED
CALCIUM SPEC-SCNC: 8.5 MG/DL (ref 8.6–10.5)
CALCIUM SPEC-SCNC: 8.6 MG/DL (ref 8.6–10.5)
CHLORIDE SERPL-SCNC: 110 MMOL/L (ref 98–107)
CHLORIDE SERPL-SCNC: 111 MMOL/L (ref 98–107)
CO2 SERPL-SCNC: 21.7 MMOL/L (ref 22–29)
CO2 SERPL-SCNC: 22.5 MMOL/L (ref 22–29)
CREAT SERPL-MCNC: 0.76 MG/DL (ref 0.76–1.27)
CREAT SERPL-MCNC: 0.82 MG/DL (ref 0.76–1.27)
D DIMER PPP FEU-MCNC: 0.76 MCGFEU/ML (ref 0–0.84)
D-LACTATE SERPL-SCNC: 1 MMOL/L (ref 0.5–2)
DEPRECATED RDW RBC AUTO: 42.4 FL (ref 37–54)
DEPRECATED RDW RBC AUTO: 43.7 FL (ref 37–54)
EGFRCR SERPLBLD CKD-EPI 2021: 86.6 ML/MIN/1.73
EGFRCR SERPLBLD CKD-EPI 2021: 88.6 ML/MIN/1.73
EOSINOPHIL # BLD AUTO: 1.43 10*3/MM3 (ref 0–0.4)
EOSINOPHIL NFR BLD AUTO: 15.3 % (ref 0.3–6.2)
ERYTHROCYTE [DISTWIDTH] IN BLOOD BY AUTOMATED COUNT: 12.4 % (ref 12.3–15.4)
ERYTHROCYTE [DISTWIDTH] IN BLOOD BY AUTOMATED COUNT: 12.5 % (ref 12.3–15.4)
FLUAV SUBTYP SPEC NAA+PROBE: NOT DETECTED
FLUBV RNA ISLT QL NAA+PROBE: NOT DETECTED
GLOBULIN UR ELPH-MCNC: 2.4 GM/DL
GLUCOSE SERPL-MCNC: 100 MG/DL (ref 65–99)
GLUCOSE SERPL-MCNC: 106 MG/DL (ref 65–99)
GRAM STN SPEC: NORMAL
HADV DNA SPEC NAA+PROBE: NOT DETECTED
HCOV 229E RNA SPEC QL NAA+PROBE: NOT DETECTED
HCOV HKU1 RNA SPEC QL NAA+PROBE: NOT DETECTED
HCOV NL63 RNA SPEC QL NAA+PROBE: NOT DETECTED
HCOV OC43 RNA SPEC QL NAA+PROBE: NOT DETECTED
HCT VFR BLD AUTO: 31.9 % (ref 37.5–51)
HCT VFR BLD AUTO: 32.2 % (ref 37.5–51)
HGB BLD-MCNC: 10.7 G/DL (ref 13–17.7)
HGB BLD-MCNC: 10.8 G/DL (ref 13–17.7)
HMPV RNA NPH QL NAA+NON-PROBE: NOT DETECTED
HPIV1 RNA ISLT QL NAA+PROBE: NOT DETECTED
HPIV2 RNA SPEC QL NAA+PROBE: NOT DETECTED
HPIV3 RNA NPH QL NAA+PROBE: NOT DETECTED
HPIV4 P GENE NPH QL NAA+PROBE: NOT DETECTED
IMM GRANULOCYTES # BLD AUTO: 0.03 10*3/MM3 (ref 0–0.05)
IMM GRANULOCYTES NFR BLD AUTO: 0.3 % (ref 0–0.5)
INR PPP: 1.06 (ref 0.9–1.1)
LYMPHOCYTES # BLD AUTO: 2.32 10*3/MM3 (ref 0.7–3.1)
LYMPHOCYTES NFR BLD AUTO: 24.9 % (ref 19.6–45.3)
M PNEUMO IGG SER IA-ACNC: NOT DETECTED
MCH RBC QN AUTO: 31.3 PG (ref 26.6–33)
MCH RBC QN AUTO: 31.6 PG (ref 26.6–33)
MCHC RBC AUTO-ENTMCNC: 33.5 G/DL (ref 31.5–35.7)
MCHC RBC AUTO-ENTMCNC: 33.5 G/DL (ref 31.5–35.7)
MCV RBC AUTO: 93.3 FL (ref 79–97)
MCV RBC AUTO: 94.2 FL (ref 79–97)
MONOCYTES # BLD AUTO: 0.73 10*3/MM3 (ref 0.1–0.9)
MONOCYTES NFR BLD AUTO: 7.8 % (ref 5–12)
NEUTROPHILS NFR BLD AUTO: 4.74 10*3/MM3 (ref 1.7–7)
NEUTROPHILS NFR BLD AUTO: 50.9 % (ref 42.7–76)
NRBC BLD AUTO-RTO: 0 /100 WBC (ref 0–0.2)
NT-PROBNP SERPL-MCNC: 256 PG/ML (ref 0–1800)
PLATELET # BLD AUTO: 220 10*3/MM3 (ref 140–450)
PLATELET # BLD AUTO: 229 10*3/MM3 (ref 140–450)
PMV BLD AUTO: 9.6 FL (ref 6–12)
PMV BLD AUTO: 9.6 FL (ref 6–12)
POTASSIUM SERPL-SCNC: 4.1 MMOL/L (ref 3.5–5.2)
POTASSIUM SERPL-SCNC: 4.3 MMOL/L (ref 3.5–5.2)
PROCALCITONIN SERPL-MCNC: 0.05 NG/ML (ref 0–0.25)
PROT SERPL-MCNC: 5.8 G/DL (ref 6–8.5)
PROTHROMBIN TIME: 14 SECONDS (ref 11.7–14.2)
RBC # BLD AUTO: 3.42 10*6/MM3 (ref 4.14–5.8)
RBC # BLD AUTO: 3.42 10*6/MM3 (ref 4.14–5.8)
RHINOVIRUS RNA SPEC NAA+PROBE: NOT DETECTED
RSV RNA NPH QL NAA+NON-PROBE: NOT DETECTED
SARS-COV-2 RNA NPH QL NAA+NON-PROBE: NOT DETECTED
SODIUM SERPL-SCNC: 142 MMOL/L (ref 136–145)
SODIUM SERPL-SCNC: 143 MMOL/L (ref 136–145)
TROPONIN T SERPL-MCNC: 0.17 NG/ML (ref 0–0.03)
TROPONIN T SERPL-MCNC: 0.39 NG/ML (ref 0–0.03)
TROPONIN T SERPL-MCNC: 0.42 NG/ML (ref 0–0.03)
WBC NRBC COR # BLD: 6.38 10*3/MM3 (ref 3.4–10.8)
WBC NRBC COR # BLD: 9.32 10*3/MM3 (ref 3.4–10.8)

## 2023-01-29 PROCEDURE — 94761 N-INVAS EAR/PLS OXIMETRY MLT: CPT

## 2023-01-29 PROCEDURE — 25010000002 HEPARIN (PORCINE) PER 1000 UNITS: Performed by: EMERGENCY MEDICINE

## 2023-01-29 PROCEDURE — 85379 FIBRIN DEGRADATION QUANT: CPT | Performed by: NURSE PRACTITIONER

## 2023-01-29 PROCEDURE — 0202U NFCT DS 22 TRGT SARS-COV-2: CPT | Performed by: INTERNAL MEDICINE

## 2023-01-29 PROCEDURE — 85730 THROMBOPLASTIN TIME PARTIAL: CPT | Performed by: EMERGENCY MEDICINE

## 2023-01-29 PROCEDURE — 25010000002 MIDAZOLAM PER 1 MG: Performed by: INTERNAL MEDICINE

## 2023-01-29 PROCEDURE — 83605 ASSAY OF LACTIC ACID: CPT | Performed by: EMERGENCY MEDICINE

## 2023-01-29 PROCEDURE — B2151ZZ FLUOROSCOPY OF LEFT HEART USING LOW OSMOLAR CONTRAST: ICD-10-PCS | Performed by: STUDENT IN AN ORGANIZED HEALTH CARE EDUCATION/TRAINING PROGRAM

## 2023-01-29 PROCEDURE — 94799 UNLISTED PULMONARY SVC/PX: CPT

## 2023-01-29 PROCEDURE — 85347 COAGULATION TIME ACTIVATED: CPT

## 2023-01-29 PROCEDURE — 85610 PROTHROMBIN TIME: CPT | Performed by: EMERGENCY MEDICINE

## 2023-01-29 PROCEDURE — 84145 PROCALCITONIN (PCT): CPT | Performed by: EMERGENCY MEDICINE

## 2023-01-29 PROCEDURE — 85027 COMPLETE CBC AUTOMATED: CPT | Performed by: NURSE PRACTITIONER

## 2023-01-29 PROCEDURE — B2111ZZ FLUOROSCOPY OF MULTIPLE CORONARY ARTERIES USING LOW OSMOLAR CONTRAST: ICD-10-PCS | Performed by: STUDENT IN AN ORGANIZED HEALTH CARE EDUCATION/TRAINING PROGRAM

## 2023-01-29 PROCEDURE — 84484 ASSAY OF TROPONIN QUANT: CPT | Performed by: NURSE PRACTITIONER

## 2023-01-29 PROCEDURE — 85730 THROMBOPLASTIN TIME PARTIAL: CPT | Performed by: INTERNAL MEDICINE

## 2023-01-29 PROCEDURE — 71045 X-RAY EXAM CHEST 1 VIEW: CPT

## 2023-01-29 PROCEDURE — 99221 1ST HOSP IP/OBS SF/LOW 40: CPT | Performed by: INTERNAL MEDICINE

## 2023-01-29 PROCEDURE — 85025 COMPLETE CBC W/AUTO DIFF WBC: CPT | Performed by: EMERGENCY MEDICINE

## 2023-01-29 PROCEDURE — 25010000002 FENTANYL CITRATE (PF) 50 MCG/ML SOLUTION: Performed by: INTERNAL MEDICINE

## 2023-01-29 PROCEDURE — 93005 ELECTROCARDIOGRAM TRACING: CPT | Performed by: EMERGENCY MEDICINE

## 2023-01-29 PROCEDURE — 93458 L HRT ARTERY/VENTRICLE ANGIO: CPT | Performed by: INTERNAL MEDICINE

## 2023-01-29 PROCEDURE — 80053 COMPREHEN METABOLIC PANEL: CPT | Performed by: EMERGENCY MEDICINE

## 2023-01-29 PROCEDURE — 25010000002 HEPARIN (PORCINE) 25000-0.45 UT/250ML-% SOLUTION: Performed by: EMERGENCY MEDICINE

## 2023-01-29 PROCEDURE — 4A023N7 MEASUREMENT OF CARDIAC SAMPLING AND PRESSURE, LEFT HEART, PERCUTANEOUS APPROACH: ICD-10-PCS | Performed by: STUDENT IN AN ORGANIZED HEALTH CARE EDUCATION/TRAINING PROGRAM

## 2023-01-29 PROCEDURE — 94640 AIRWAY INHALATION TREATMENT: CPT

## 2023-01-29 PROCEDURE — C1769 GUIDE WIRE: HCPCS | Performed by: INTERNAL MEDICINE

## 2023-01-29 PROCEDURE — 25010000002 HEPARIN (PORCINE) PER 1000 UNITS: Performed by: INTERNAL MEDICINE

## 2023-01-29 PROCEDURE — 87205 SMEAR GRAM STAIN: CPT | Performed by: INTERNAL MEDICINE

## 2023-01-29 PROCEDURE — C1894 INTRO/SHEATH, NON-LASER: HCPCS | Performed by: INTERNAL MEDICINE

## 2023-01-29 PROCEDURE — 84484 ASSAY OF TROPONIN QUANT: CPT | Performed by: EMERGENCY MEDICINE

## 2023-01-29 PROCEDURE — 83880 ASSAY OF NATRIURETIC PEPTIDE: CPT | Performed by: EMERGENCY MEDICINE

## 2023-01-29 PROCEDURE — 0 IOPAMIDOL PER 1 ML: Performed by: INTERNAL MEDICINE

## 2023-01-29 RX ORDER — IPRATROPIUM BROMIDE AND ALBUTEROL SULFATE 2.5; .5 MG/3ML; MG/3ML
3 SOLUTION RESPIRATORY (INHALATION) EVERY 6 HOURS PRN
Status: DISCONTINUED | OUTPATIENT
Start: 2023-01-29 | End: 2023-01-31 | Stop reason: HOSPADM

## 2023-01-29 RX ORDER — ONDANSETRON 2 MG/ML
4 INJECTION INTRAMUSCULAR; INTRAVENOUS EVERY 6 HOURS PRN
Status: DISCONTINUED | OUTPATIENT
Start: 2023-01-29 | End: 2023-01-29 | Stop reason: SDUPTHER

## 2023-01-29 RX ORDER — SODIUM CHLORIDE 9 MG/ML
INJECTION, SOLUTION INTRAVENOUS
Status: COMPLETED | OUTPATIENT
Start: 2023-01-29 | End: 2023-01-29

## 2023-01-29 RX ORDER — ACETAMINOPHEN 160 MG/5ML
650 SOLUTION ORAL EVERY 4 HOURS PRN
Status: DISCONTINUED | OUTPATIENT
Start: 2023-01-29 | End: 2023-01-29

## 2023-01-29 RX ORDER — SODIUM CHLORIDE FOR INHALATION 7 %
4 VIAL, NEBULIZER (ML) INHALATION 2 TIMES DAILY
Status: DISCONTINUED | OUTPATIENT
Start: 2023-01-29 | End: 2023-01-31 | Stop reason: HOSPADM

## 2023-01-29 RX ORDER — PANTOPRAZOLE SODIUM 40 MG/1
40 TABLET, DELAYED RELEASE ORAL DAILY
Status: DISCONTINUED | OUTPATIENT
Start: 2023-01-29 | End: 2023-01-31 | Stop reason: HOSPADM

## 2023-01-29 RX ORDER — SODIUM CHLORIDE 0.9 % (FLUSH) 0.9 %
10 SYRINGE (ML) INJECTION AS NEEDED
Status: DISCONTINUED | OUTPATIENT
Start: 2023-01-29 | End: 2023-01-31 | Stop reason: HOSPADM

## 2023-01-29 RX ORDER — ONDANSETRON 4 MG/1
4 TABLET, FILM COATED ORAL EVERY 6 HOURS PRN
Status: DISCONTINUED | OUTPATIENT
Start: 2023-01-29 | End: 2023-01-31 | Stop reason: HOSPADM

## 2023-01-29 RX ORDER — HEPARIN SODIUM 5000 [USP'U]/ML
30-50.4 INJECTION, SOLUTION INTRAVENOUS; SUBCUTANEOUS EVERY 6 HOURS PRN
Status: DISCONTINUED | OUTPATIENT
Start: 2023-01-29 | End: 2023-01-31 | Stop reason: HOSPADM

## 2023-01-29 RX ORDER — ALBUTEROL SULFATE 2.5 MG/3ML
2.5 SOLUTION RESPIRATORY (INHALATION) EVERY 6 HOURS PRN
Status: DISCONTINUED | OUTPATIENT
Start: 2023-01-29 | End: 2023-01-31 | Stop reason: HOSPADM

## 2023-01-29 RX ORDER — MIDAZOLAM HYDROCHLORIDE 1 MG/ML
INJECTION INTRAMUSCULAR; INTRAVENOUS
Status: DISCONTINUED | OUTPATIENT
Start: 2023-01-29 | End: 2023-01-29 | Stop reason: HOSPADM

## 2023-01-29 RX ORDER — ACETAMINOPHEN 650 MG/1
650 SUPPOSITORY RECTAL EVERY 4 HOURS PRN
Status: DISCONTINUED | OUTPATIENT
Start: 2023-01-29 | End: 2023-01-29

## 2023-01-29 RX ORDER — IPRATROPIUM BROMIDE AND ALBUTEROL SULFATE 2.5; .5 MG/3ML; MG/3ML
3 SOLUTION RESPIRATORY (INHALATION)
Status: DISCONTINUED | OUTPATIENT
Start: 2023-01-29 | End: 2023-01-29

## 2023-01-29 RX ORDER — LIDOCAINE HYDROCHLORIDE 20 MG/ML
INJECTION, SOLUTION INFILTRATION; PERINEURAL
Status: DISCONTINUED | OUTPATIENT
Start: 2023-01-29 | End: 2023-01-29 | Stop reason: HOSPADM

## 2023-01-29 RX ORDER — ACETAMINOPHEN 325 MG/1
650 TABLET ORAL EVERY 4 HOURS PRN
Status: DISCONTINUED | OUTPATIENT
Start: 2023-01-29 | End: 2023-01-29 | Stop reason: SDUPTHER

## 2023-01-29 RX ORDER — GUAIFENESIN 600 MG/1
1200 TABLET, EXTENDED RELEASE ORAL EVERY 12 HOURS SCHEDULED
Status: DISCONTINUED | OUTPATIENT
Start: 2023-01-29 | End: 2023-01-31 | Stop reason: HOSPADM

## 2023-01-29 RX ORDER — FENTANYL CITRATE 50 UG/ML
INJECTION, SOLUTION INTRAMUSCULAR; INTRAVENOUS
Status: DISCONTINUED | OUTPATIENT
Start: 2023-01-29 | End: 2023-01-29 | Stop reason: HOSPADM

## 2023-01-29 RX ORDER — ACETAMINOPHEN 160 MG/5ML
650 SOLUTION ORAL EVERY 4 HOURS PRN
Status: DISCONTINUED | OUTPATIENT
Start: 2023-01-29 | End: 2023-01-29 | Stop reason: SDUPTHER

## 2023-01-29 RX ORDER — SODIUM CHLORIDE 0.9 % (FLUSH) 0.9 %
10 SYRINGE (ML) INJECTION EVERY 12 HOURS SCHEDULED
Status: DISCONTINUED | OUTPATIENT
Start: 2023-01-29 | End: 2023-01-29

## 2023-01-29 RX ORDER — NITROGLYCERIN 0.4 MG/1
0.4 TABLET SUBLINGUAL
Status: DISCONTINUED | OUTPATIENT
Start: 2023-01-29 | End: 2023-01-31 | Stop reason: HOSPADM

## 2023-01-29 RX ORDER — ASPIRIN 81 MG/1
324 TABLET, CHEWABLE ORAL ONCE
Status: COMPLETED | OUTPATIENT
Start: 2023-01-29 | End: 2023-01-29

## 2023-01-29 RX ORDER — SODIUM CHLORIDE 0.9 % (FLUSH) 0.9 %
10 SYRINGE (ML) INJECTION AS NEEDED
Status: DISCONTINUED | OUTPATIENT
Start: 2023-01-29 | End: 2023-01-29

## 2023-01-29 RX ORDER — ONDANSETRON 2 MG/ML
4 INJECTION INTRAMUSCULAR; INTRAVENOUS EVERY 6 HOURS PRN
Status: DISCONTINUED | OUTPATIENT
Start: 2023-01-29 | End: 2023-01-31 | Stop reason: HOSPADM

## 2023-01-29 RX ORDER — NYSTATIN 100000 U/G
1 CREAM TOPICAL EVERY 12 HOURS SCHEDULED
Status: DISCONTINUED | OUTPATIENT
Start: 2023-01-29 | End: 2023-01-31 | Stop reason: HOSPADM

## 2023-01-29 RX ORDER — FLUTICASONE PROPIONATE 50 MCG
2 SPRAY, SUSPENSION (ML) NASAL DAILY
Status: DISCONTINUED | OUTPATIENT
Start: 2023-01-29 | End: 2023-01-31 | Stop reason: HOSPADM

## 2023-01-29 RX ORDER — HEPARIN SODIUM 10000 [USP'U]/100ML
12 INJECTION, SOLUTION INTRAVENOUS
Status: DISCONTINUED | OUTPATIENT
Start: 2023-01-29 | End: 2023-01-29

## 2023-01-29 RX ORDER — AZELASTINE 1 MG/ML
2 SPRAY, METERED NASAL 2 TIMES DAILY
Status: DISCONTINUED | OUTPATIENT
Start: 2023-01-29 | End: 2023-01-31 | Stop reason: HOSPADM

## 2023-01-29 RX ORDER — AZITHROMYCIN 250 MG/1
250 TABLET, FILM COATED ORAL 3 TIMES WEEKLY
Status: DISCONTINUED | OUTPATIENT
Start: 2023-01-30 | End: 2023-01-30

## 2023-01-29 RX ORDER — ROPINIROLE 0.5 MG/1
0.5 TABLET, FILM COATED ORAL NIGHTLY
Status: DISCONTINUED | OUTPATIENT
Start: 2023-01-29 | End: 2023-01-31 | Stop reason: HOSPADM

## 2023-01-29 RX ORDER — ACETAMINOPHEN 650 MG/1
650 SUPPOSITORY RECTAL EVERY 4 HOURS PRN
Status: DISCONTINUED | OUTPATIENT
Start: 2023-01-29 | End: 2023-01-29 | Stop reason: SDUPTHER

## 2023-01-29 RX ORDER — HEPARIN SODIUM 5000 [USP'U]/ML
50.4 INJECTION, SOLUTION INTRAVENOUS; SUBCUTANEOUS ONCE
Status: COMPLETED | OUTPATIENT
Start: 2023-01-29 | End: 2023-01-29

## 2023-01-29 RX ORDER — SODIUM CHLORIDE 9 MG/ML
40 INJECTION, SOLUTION INTRAVENOUS AS NEEDED
Status: DISCONTINUED | OUTPATIENT
Start: 2023-01-29 | End: 2023-01-29

## 2023-01-29 RX ORDER — ACETAMINOPHEN 325 MG/1
650 TABLET ORAL EVERY 4 HOURS PRN
Status: DISCONTINUED | OUTPATIENT
Start: 2023-01-29 | End: 2023-01-31 | Stop reason: HOSPADM

## 2023-01-29 RX ORDER — FLUOXETINE HYDROCHLORIDE 20 MG/1
20 CAPSULE ORAL DAILY
Status: DISCONTINUED | OUTPATIENT
Start: 2023-01-29 | End: 2023-01-31 | Stop reason: HOSPADM

## 2023-01-29 RX ORDER — HEPARIN SODIUM 1000 [USP'U]/ML
INJECTION, SOLUTION INTRAVENOUS; SUBCUTANEOUS
Status: DISCONTINUED | OUTPATIENT
Start: 2023-01-29 | End: 2023-01-29 | Stop reason: HOSPADM

## 2023-01-29 RX ORDER — BUDESONIDE 0.5 MG/2ML
0.5 INHALANT ORAL
Status: DISCONTINUED | OUTPATIENT
Start: 2023-01-29 | End: 2023-01-31 | Stop reason: HOSPADM

## 2023-01-29 RX ORDER — SODIUM CHLORIDE 0.9 % (FLUSH) 0.9 %
10 SYRINGE (ML) INJECTION EVERY 12 HOURS SCHEDULED
Status: DISCONTINUED | OUTPATIENT
Start: 2023-01-29 | End: 2023-01-31 | Stop reason: HOSPADM

## 2023-01-29 RX ORDER — CALCIUM CARBONATE 200(500)MG
2 TABLET,CHEWABLE ORAL 2 TIMES DAILY PRN
Status: DISCONTINUED | OUTPATIENT
Start: 2023-01-29 | End: 2023-01-29

## 2023-01-29 RX ORDER — VERAPAMIL HYDROCHLORIDE 2.5 MG/ML
INJECTION, SOLUTION INTRAVENOUS
Status: DISCONTINUED | OUTPATIENT
Start: 2023-01-29 | End: 2023-01-29 | Stop reason: HOSPADM

## 2023-01-29 RX ADMIN — HEPARIN SODIUM 12 UNITS/KG/HR: 10000 INJECTION, SOLUTION INTRAVENOUS at 04:56

## 2023-01-29 RX ADMIN — AZELASTINE HYDROCHLORIDE 2 SPRAY: 137 SPRAY, METERED NASAL at 20:44

## 2023-01-29 RX ADMIN — IPRATROPIUM BROMIDE AND ALBUTEROL SULFATE 3 ML: 2.5; .5 SOLUTION RESPIRATORY (INHALATION) at 19:33

## 2023-01-29 RX ADMIN — HEPARIN SODIUM 4000 UNITS: 5000 INJECTION INTRAVENOUS; SUBCUTANEOUS at 04:56

## 2023-01-29 RX ADMIN — GUAIFENESIN 1200 MG: 600 TABLET, EXTENDED RELEASE ORAL at 20:43

## 2023-01-29 RX ADMIN — SODIUM CHLORIDE SOLN NEBU 7% 4 ML: 7 NEBU SOLN at 19:33

## 2023-01-29 RX ADMIN — SODIUM CHLORIDE 500 ML: 9 INJECTION, SOLUTION INTRAVENOUS at 00:33

## 2023-01-29 RX ADMIN — BUDESONIDE 0.5 MG: 0.5 INHALANT ORAL at 19:32

## 2023-01-29 RX ADMIN — AZELASTINE HYDROCHLORIDE 2 SPRAY: 137 SPRAY, METERED NASAL at 13:17

## 2023-01-29 RX ADMIN — NYSTATIN 1 APPLICATION: 100000 CREAM TOPICAL at 13:17

## 2023-01-29 RX ADMIN — FLUOXETINE HYDROCHLORIDE 20 MG: 20 CAPSULE ORAL at 13:18

## 2023-01-29 RX ADMIN — BUDESONIDE 0.5 MG: 0.5 INHALANT ORAL at 11:54

## 2023-01-29 RX ADMIN — ROPINIROLE HYDROCHLORIDE 0.5 MG: 0.5 TABLET, FILM COATED ORAL at 20:43

## 2023-01-29 RX ADMIN — SODIUM CHLORIDE 500 ML: 9 INJECTION, SOLUTION INTRAVENOUS at 02:17

## 2023-01-29 RX ADMIN — DOXYCYCLINE 100 MG: 100 INJECTION, POWDER, LYOPHILIZED, FOR SOLUTION INTRAVENOUS at 17:52

## 2023-01-29 RX ADMIN — ALBUTEROL SULFATE 2.5 MG: 2.5 SOLUTION RESPIRATORY (INHALATION) at 11:51

## 2023-01-29 RX ADMIN — NYSTATIN 1 APPLICATION: 100000 CREAM TOPICAL at 20:44

## 2023-01-29 RX ADMIN — ASPIRIN 324 MG: 81 TABLET, CHEWABLE ORAL at 04:35

## 2023-01-29 RX ADMIN — ACETAMINOPHEN 650 MG: 325 TABLET, FILM COATED ORAL at 13:23

## 2023-01-29 RX ADMIN — PANTOPRAZOLE SODIUM 40 MG: 40 TABLET, DELAYED RELEASE ORAL at 13:18

## 2023-01-29 RX ADMIN — SODIUM CHLORIDE SOLN NEBU 7% 4 ML: 7 NEBU SOLN at 11:57

## 2023-01-29 RX ADMIN — FLUTICASONE PROPIONATE 2 SPRAY: 50 SPRAY, METERED NASAL at 13:17

## 2023-01-30 ENCOUNTER — APPOINTMENT (OUTPATIENT)
Dept: CARDIOLOGY | Facility: HOSPITAL | Age: 83
DRG: 190 | End: 2023-01-30
Payer: MEDICARE

## 2023-01-30 LAB
ANION GAP SERPL CALCULATED.3IONS-SCNC: 9 MMOL/L (ref 5–15)
AORTIC DIMENSIONLESS INDEX: 0.6 (DI)
APTT PPP: 29.6 SECONDS (ref 22.7–35.4)
ASCENDING AORTA: 4.5 CM
BASOPHILS # BLD AUTO: 0.06 10*3/MM3 (ref 0–0.2)
BASOPHILS NFR BLD AUTO: 1 % (ref 0–1.5)
BH CV ECHO MEAS - AI P1/2T: 342.2 MSEC
BH CV ECHO MEAS - AO MAX PG: 10.9 MMHG
BH CV ECHO MEAS - AO MEAN PG: 5.6 MMHG
BH CV ECHO MEAS - AO V2 MAX: 164.8 CM/SEC
BH CV ECHO MEAS - AO V2 VTI: 34.4 CM
BH CV ECHO MEAS - AVA(I,D): 2.1 CM2
BH CV ECHO MEAS - EDV(MOD-SP2): 149 ML
BH CV ECHO MEAS - EDV(MOD-SP4): 123 ML
BH CV ECHO MEAS - EF(MOD-BP): 51.6 %
BH CV ECHO MEAS - EF(MOD-SP2): 55 %
BH CV ECHO MEAS - EF(MOD-SP4): 48.8 %
BH CV ECHO MEAS - ESV(MOD-SP2): 67 ML
BH CV ECHO MEAS - ESV(MOD-SP4): 63 ML
BH CV ECHO MEAS - LAT PEAK E' VEL: 9.5 CM/SEC
BH CV ECHO MEAS - LV DIASTOLIC VOL/BSA (35-75): 61.6 CM2
BH CV ECHO MEAS - LV MAX PG: 2.8 MMHG
BH CV ECHO MEAS - LV MEAN PG: 1.74 MMHG
BH CV ECHO MEAS - LV SYSTOLIC VOL/BSA (12-30): 31.5 CM2
BH CV ECHO MEAS - LV V1 MAX: 83.1 CM/SEC
BH CV ECHO MEAS - LV V1 VTI: 19.8 CM
BH CV ECHO MEAS - LVOT AREA: 3.6 CM2
BH CV ECHO MEAS - LVOT DIAM: 2.16 CM
BH CV ECHO MEAS - MED PEAK E' VEL: 7 CM/SEC
BH CV ECHO MEAS - MV A DUR: 0.13 SEC
BH CV ECHO MEAS - MV A MAX VEL: 86.1 CM/SEC
BH CV ECHO MEAS - MV DEC SLOPE: 211.2 CM/SEC2
BH CV ECHO MEAS - MV DEC TIME: 260 MSEC
BH CV ECHO MEAS - MV E MAX VEL: 79.7 CM/SEC
BH CV ECHO MEAS - MV E/A: 0.93
BH CV ECHO MEAS - MV MAX PG: 3.6 MMHG
BH CV ECHO MEAS - MV MEAN PG: 1.73 MMHG
BH CV ECHO MEAS - MV P1/2T: 122.6 MSEC
BH CV ECHO MEAS - MV V2 VTI: 29.5 CM
BH CV ECHO MEAS - MVA(P1/2T): 1.79 CM2
BH CV ECHO MEAS - MVA(VTI): 2.45 CM2
BH CV ECHO MEAS - PA ACC TIME: 0.1 SEC
BH CV ECHO MEAS - PA PR(ACCEL): 32.7 MMHG
BH CV ECHO MEAS - PA V2 MAX: 69.8 CM/SEC
BH CV ECHO MEAS - PULM A REVS DUR: 0.13 SEC
BH CV ECHO MEAS - PULM A REVS VEL: 40.5 CM/SEC
BH CV ECHO MEAS - PULM DIAS VEL: 31.9 CM/SEC
BH CV ECHO MEAS - PULM S/D: 1.22
BH CV ECHO MEAS - PULM SYS VEL: 39 CM/SEC
BH CV ECHO MEAS - RAP SYSTOLE: 8 MMHG
BH CV ECHO MEAS - RV MAX PG: 0.94 MMHG
BH CV ECHO MEAS - RV V1 MAX: 48.4 CM/SEC
BH CV ECHO MEAS - RV V1 VTI: 9.1 CM
BH CV ECHO MEAS - RVSP: 38.5 MMHG
BH CV ECHO MEAS - SI(MOD-SP2): 41.1 ML/M2
BH CV ECHO MEAS - SI(MOD-SP4): 30 ML/M2
BH CV ECHO MEAS - SV(LVOT): 72.3 ML
BH CV ECHO MEAS - SV(MOD-SP2): 82 ML
BH CV ECHO MEAS - SV(MOD-SP4): 60 ML
BH CV ECHO MEAS - TAPSE (>1.6): 2.38 CM
BH CV ECHO MEAS - TR MAX PG: 30.5 MMHG
BH CV ECHO MEAS - TR MAX VEL: 276.2 CM/SEC
BH CV ECHO MEASUREMENTS AVERAGE E/E' RATIO: 9.66
BH CV XLRA - RV BASE: 3.6 CM
BH CV XLRA - RV LENGTH: 7.3 CM
BH CV XLRA - RV MID: 2.14 CM
BH CV XLRA - TDI S': 12.6 CM/SEC
BUN SERPL-MCNC: 7 MG/DL (ref 8–23)
BUN/CREAT SERPL: 9 (ref 7–25)
CALCIUM SPEC-SCNC: 8.9 MG/DL (ref 8.6–10.5)
CHLORIDE SERPL-SCNC: 109 MMOL/L (ref 98–107)
CO2 SERPL-SCNC: 22 MMOL/L (ref 22–29)
CREAT SERPL-MCNC: 0.78 MG/DL (ref 0.76–1.27)
DEPRECATED RDW RBC AUTO: 43 FL (ref 37–54)
EGFRCR SERPLBLD CKD-EPI 2021: 87.9 ML/MIN/1.73
EOSINOPHIL # BLD AUTO: 1.09 10*3/MM3 (ref 0–0.4)
EOSINOPHIL NFR BLD AUTO: 18 % (ref 0.3–6.2)
ERYTHROCYTE [DISTWIDTH] IN BLOOD BY AUTOMATED COUNT: 12.3 % (ref 12.3–15.4)
GLUCOSE SERPL-MCNC: 83 MG/DL (ref 65–99)
HCT VFR BLD AUTO: 35 % (ref 37.5–51)
HGB BLD-MCNC: 11.6 G/DL (ref 13–17.7)
IMM GRANULOCYTES # BLD AUTO: 0.01 10*3/MM3 (ref 0–0.05)
IMM GRANULOCYTES NFR BLD AUTO: 0.2 % (ref 0–0.5)
LEFT ATRIUM VOLUME INDEX: 33.4 ML/M2
LYMPHOCYTES # BLD AUTO: 1.41 10*3/MM3 (ref 0.7–3.1)
LYMPHOCYTES NFR BLD AUTO: 23.3 % (ref 19.6–45.3)
MAXIMAL PREDICTED HEART RATE: 136 BPM
MCH RBC QN AUTO: 31.2 PG (ref 26.6–33)
MCHC RBC AUTO-ENTMCNC: 33.1 G/DL (ref 31.5–35.7)
MCV RBC AUTO: 94.1 FL (ref 79–97)
MONOCYTES # BLD AUTO: 0.69 10*3/MM3 (ref 0.1–0.9)
MONOCYTES NFR BLD AUTO: 11.4 % (ref 5–12)
NEUTROPHILS NFR BLD AUTO: 2.8 10*3/MM3 (ref 1.7–7)
NEUTROPHILS NFR BLD AUTO: 46.1 % (ref 42.7–76)
NRBC BLD AUTO-RTO: 0 /100 WBC (ref 0–0.2)
PLATELET # BLD AUTO: 203 10*3/MM3 (ref 140–450)
PMV BLD AUTO: 9.7 FL (ref 6–12)
POTASSIUM SERPL-SCNC: 4 MMOL/L (ref 3.5–5.2)
PROCALCITONIN SERPL-MCNC: 0.09 NG/ML (ref 0–0.25)
QT INTERVAL: 385 MS
QT INTERVAL: 406 MS
RBC # BLD AUTO: 3.72 10*6/MM3 (ref 4.14–5.8)
SINUS: 3.4 CM
SODIUM SERPL-SCNC: 140 MMOL/L (ref 136–145)
STRESS TARGET HR: 116 BPM
TROPONIN T SERPL-MCNC: 0.13 NG/ML (ref 0–0.03)
WBC NRBC COR # BLD: 6.06 10*3/MM3 (ref 3.4–10.8)

## 2023-01-30 PROCEDURE — 93306 TTE W/DOPPLER COMPLETE: CPT | Performed by: INTERNAL MEDICINE

## 2023-01-30 PROCEDURE — 36415 COLL VENOUS BLD VENIPUNCTURE: CPT | Performed by: INTERNAL MEDICINE

## 2023-01-30 PROCEDURE — 85730 THROMBOPLASTIN TIME PARTIAL: CPT | Performed by: INTERNAL MEDICINE

## 2023-01-30 PROCEDURE — 99232 SBSQ HOSP IP/OBS MODERATE 35: CPT

## 2023-01-30 PROCEDURE — 80048 BASIC METABOLIC PNL TOTAL CA: CPT | Performed by: INTERNAL MEDICINE

## 2023-01-30 PROCEDURE — 94799 UNLISTED PULMONARY SVC/PX: CPT

## 2023-01-30 PROCEDURE — 93306 TTE W/DOPPLER COMPLETE: CPT

## 2023-01-30 PROCEDURE — 84145 PROCALCITONIN (PCT): CPT | Performed by: INTERNAL MEDICINE

## 2023-01-30 PROCEDURE — 63710000001 METHYLPREDNISOLONE 4 MG TABLET THERAPY PACK 21 EACH DISP PACK: Performed by: INTERNAL MEDICINE

## 2023-01-30 PROCEDURE — 84484 ASSAY OF TROPONIN QUANT: CPT | Performed by: NURSE PRACTITIONER

## 2023-01-30 PROCEDURE — 94664 DEMO&/EVAL PT USE INHALER: CPT

## 2023-01-30 PROCEDURE — 94761 N-INVAS EAR/PLS OXIMETRY MLT: CPT

## 2023-01-30 PROCEDURE — 85025 COMPLETE CBC W/AUTO DIFF WBC: CPT | Performed by: INTERNAL MEDICINE

## 2023-01-30 RX ORDER — METHYLPREDNISOLONE 4 MG/1
4 TABLET ORAL
Status: DISCONTINUED | OUTPATIENT
Start: 2023-02-02 | End: 2023-01-31 | Stop reason: HOSPADM

## 2023-01-30 RX ORDER — METHYLPREDNISOLONE 4 MG/1
4 TABLET ORAL
Status: DISCONTINUED | OUTPATIENT
Start: 2023-02-01 | End: 2023-01-31 | Stop reason: HOSPADM

## 2023-01-30 RX ORDER — METHYLPREDNISOLONE 4 MG/1
8 TABLET ORAL
Status: DISCONTINUED | OUTPATIENT
Start: 2023-01-31 | End: 2023-01-31 | Stop reason: HOSPADM

## 2023-01-30 RX ORDER — METHYLPREDNISOLONE 4 MG/1
4 TABLET ORAL
Status: DISCONTINUED | OUTPATIENT
Start: 2023-02-03 | End: 2023-01-31 | Stop reason: HOSPADM

## 2023-01-30 RX ORDER — FLUCONAZOLE 200 MG/1
400 TABLET ORAL ONCE
Status: COMPLETED | OUTPATIENT
Start: 2023-01-30 | End: 2023-01-30

## 2023-01-30 RX ORDER — METHYLPREDNISOLONE 4 MG/1
24 TABLET ORAL ONCE
Status: COMPLETED | OUTPATIENT
Start: 2023-01-30 | End: 2023-01-30

## 2023-01-30 RX ORDER — METHYLPREDNISOLONE 4 MG/1
4 TABLET ORAL
Status: DISCONTINUED | OUTPATIENT
Start: 2023-01-31 | End: 2023-01-31 | Stop reason: HOSPADM

## 2023-01-30 RX ORDER — DOXYCYCLINE 100 MG/1
100 CAPSULE ORAL EVERY 12 HOURS SCHEDULED
Status: DISCONTINUED | OUTPATIENT
Start: 2023-01-30 | End: 2023-01-31 | Stop reason: HOSPADM

## 2023-01-30 RX ORDER — METHYLPREDNISOLONE 4 MG/1
4 TABLET ORAL
Status: DISCONTINUED | OUTPATIENT
Start: 2023-02-04 | End: 2023-01-31 | Stop reason: HOSPADM

## 2023-01-30 RX ADMIN — APIXABAN 5 MG: 5 TABLET, FILM COATED ORAL at 09:39

## 2023-01-30 RX ADMIN — Medication 10 ML: at 21:07

## 2023-01-30 RX ADMIN — DOXYCYCLINE 100 MG: 100 INJECTION, POWDER, LYOPHILIZED, FOR SOLUTION INTRAVENOUS at 16:14

## 2023-01-30 RX ADMIN — BUDESONIDE 0.5 MG: 0.5 INHALANT ORAL at 20:00

## 2023-01-30 RX ADMIN — AZITHROMYCIN DIHYDRATE 250 MG: 250 TABLET, FILM COATED ORAL at 09:39

## 2023-01-30 RX ADMIN — ACETAMINOPHEN 650 MG: 325 TABLET, FILM COATED ORAL at 21:17

## 2023-01-30 RX ADMIN — IPRATROPIUM BROMIDE AND ALBUTEROL SULFATE 3 ML: 2.5; .5 SOLUTION RESPIRATORY (INHALATION) at 12:21

## 2023-01-30 RX ADMIN — NYSTATIN 1 APPLICATION: 100000 CREAM TOPICAL at 21:17

## 2023-01-30 RX ADMIN — FLUOXETINE HYDROCHLORIDE 20 MG: 20 CAPSULE ORAL at 09:39

## 2023-01-30 RX ADMIN — APIXABAN 5 MG: 5 TABLET, FILM COATED ORAL at 21:07

## 2023-01-30 RX ADMIN — ROPINIROLE HYDROCHLORIDE 0.5 MG: 0.5 TABLET, FILM COATED ORAL at 21:07

## 2023-01-30 RX ADMIN — BUDESONIDE 0.5 MG: 0.5 INHALANT ORAL at 06:49

## 2023-01-30 RX ADMIN — NYSTATIN 1 APPLICATION: 100000 CREAM TOPICAL at 09:39

## 2023-01-30 RX ADMIN — SODIUM CHLORIDE SOLN NEBU 7% 4 ML: 7 NEBU SOLN at 06:50

## 2023-01-30 RX ADMIN — FLUTICASONE PROPIONATE 2 SPRAY: 50 SPRAY, METERED NASAL at 09:40

## 2023-01-30 RX ADMIN — PANTOPRAZOLE SODIUM 40 MG: 40 TABLET, DELAYED RELEASE ORAL at 09:39

## 2023-01-30 RX ADMIN — GUAIFENESIN 1200 MG: 600 TABLET, EXTENDED RELEASE ORAL at 21:07

## 2023-01-30 RX ADMIN — METHYLPREDNISOLONE 24 MG: 4 TABLET ORAL at 21:08

## 2023-01-30 RX ADMIN — GUAIFENESIN 1200 MG: 600 TABLET, EXTENDED RELEASE ORAL at 09:39

## 2023-01-30 RX ADMIN — DOXYCYCLINE 100 MG: 100 INJECTION, POWDER, LYOPHILIZED, FOR SOLUTION INTRAVENOUS at 03:52

## 2023-01-30 RX ADMIN — ACETAMINOPHEN 650 MG: 325 TABLET, FILM COATED ORAL at 03:52

## 2023-01-30 RX ADMIN — AZELASTINE HYDROCHLORIDE 2 SPRAY: 137 SPRAY, METERED NASAL at 09:39

## 2023-01-30 RX ADMIN — SODIUM CHLORIDE SOLN NEBU 7% 4 ML: 7 NEBU SOLN at 19:59

## 2023-01-30 RX ADMIN — Medication 10 ML: at 09:40

## 2023-01-30 RX ADMIN — AZELASTINE HYDROCHLORIDE 2 SPRAY: 137 SPRAY, METERED NASAL at 21:07

## 2023-01-30 RX ADMIN — ALBUTEROL SULFATE 2.5 MG: 2.5 SOLUTION RESPIRATORY (INHALATION) at 06:49

## 2023-01-30 RX ADMIN — IPRATROPIUM BROMIDE AND ALBUTEROL SULFATE 3 ML: 2.5; .5 SOLUTION RESPIRATORY (INHALATION) at 19:59

## 2023-01-30 RX ADMIN — ACETAMINOPHEN 650 MG: 325 TABLET, FILM COATED ORAL at 14:32

## 2023-01-30 RX ADMIN — FLUCONAZOLE 400 MG: 200 TABLET ORAL at 21:07

## 2023-01-31 ENCOUNTER — READMISSION MANAGEMENT (OUTPATIENT)
Dept: CALL CENTER | Facility: HOSPITAL | Age: 83
End: 2023-01-31
Payer: MEDICARE

## 2023-01-31 VITALS
RESPIRATION RATE: 18 BRPM | TEMPERATURE: 97.6 F | BODY MASS INDEX: 24.75 KG/M2 | HEIGHT: 71 IN | WEIGHT: 176.8 LBS | OXYGEN SATURATION: 97 % | DIASTOLIC BLOOD PRESSURE: 66 MMHG | HEART RATE: 98 BPM | SYSTOLIC BLOOD PRESSURE: 129 MMHG

## 2023-01-31 LAB
APTT PPP: 32.8 SECONDS (ref 22.7–35.4)
BASOPHILS # BLD AUTO: 0.02 10*3/MM3 (ref 0–0.2)
BASOPHILS NFR BLD AUTO: 0.3 % (ref 0–1.5)
DEPRECATED RDW RBC AUTO: 43.4 FL (ref 37–54)
EOSINOPHIL # BLD AUTO: 0.02 10*3/MM3 (ref 0–0.4)
EOSINOPHIL NFR BLD AUTO: 0.3 % (ref 0.3–6.2)
ERYTHROCYTE [DISTWIDTH] IN BLOOD BY AUTOMATED COUNT: 12.6 % (ref 12.3–15.4)
HCT VFR BLD AUTO: 37.9 % (ref 37.5–51)
HGB BLD-MCNC: 12.6 G/DL (ref 13–17.7)
HOLD SPECIMEN: NORMAL
IMM GRANULOCYTES # BLD AUTO: 0.02 10*3/MM3 (ref 0–0.05)
IMM GRANULOCYTES NFR BLD AUTO: 0.3 % (ref 0–0.5)
LYMPHOCYTES # BLD AUTO: 0.93 10*3/MM3 (ref 0.7–3.1)
LYMPHOCYTES NFR BLD AUTO: 15.1 % (ref 19.6–45.3)
MCH RBC QN AUTO: 31.6 PG (ref 26.6–33)
MCHC RBC AUTO-ENTMCNC: 33.2 G/DL (ref 31.5–35.7)
MCV RBC AUTO: 95 FL (ref 79–97)
MONOCYTES # BLD AUTO: 0.07 10*3/MM3 (ref 0.1–0.9)
MONOCYTES NFR BLD AUTO: 1.1 % (ref 5–12)
NEUTROPHILS NFR BLD AUTO: 5.08 10*3/MM3 (ref 1.7–7)
NEUTROPHILS NFR BLD AUTO: 82.9 % (ref 42.7–76)
NRBC BLD AUTO-RTO: 0 /100 WBC (ref 0–0.2)
PLATELET # BLD AUTO: 225 10*3/MM3 (ref 140–450)
PMV BLD AUTO: 9.4 FL (ref 6–12)
RBC # BLD AUTO: 3.99 10*6/MM3 (ref 4.14–5.8)
WBC NRBC COR # BLD: 6.14 10*3/MM3 (ref 3.4–10.8)

## 2023-01-31 PROCEDURE — 94799 UNLISTED PULMONARY SVC/PX: CPT

## 2023-01-31 PROCEDURE — 94761 N-INVAS EAR/PLS OXIMETRY MLT: CPT

## 2023-01-31 PROCEDURE — 97162 PT EVAL MOD COMPLEX 30 MIN: CPT

## 2023-01-31 PROCEDURE — 94760 N-INVAS EAR/PLS OXIMETRY 1: CPT

## 2023-01-31 PROCEDURE — 85025 COMPLETE CBC W/AUTO DIFF WBC: CPT | Performed by: INTERNAL MEDICINE

## 2023-01-31 PROCEDURE — 97530 THERAPEUTIC ACTIVITIES: CPT

## 2023-01-31 PROCEDURE — 99232 SBSQ HOSP IP/OBS MODERATE 35: CPT | Performed by: INTERNAL MEDICINE

## 2023-01-31 PROCEDURE — 63710000001 METHYLPREDNISOLONE 4 MG TABLET THERAPY PACK 21 EACH DISP PACK: Performed by: INTERNAL MEDICINE

## 2023-01-31 PROCEDURE — 94664 DEMO&/EVAL PT USE INHALER: CPT

## 2023-01-31 PROCEDURE — 85730 THROMBOPLASTIN TIME PARTIAL: CPT | Performed by: INTERNAL MEDICINE

## 2023-01-31 RX ORDER — METHYLPREDNISOLONE 4 MG/1
TABLET ORAL
Qty: 21 TABLET | Refills: 0 | Status: SHIPPED | OUTPATIENT
Start: 2023-02-03 | End: 2023-01-31

## 2023-01-31 RX ORDER — METHYLPREDNISOLONE 4 MG/1
TABLET ORAL
Qty: 21 TABLET | Refills: 0 | Status: SHIPPED | OUTPATIENT
Start: 2023-01-31 | End: 2023-02-17

## 2023-01-31 RX ORDER — METHYLPREDNISOLONE 4 MG/1
TABLET ORAL
Qty: 21 TABLET | Refills: 0 | Status: SHIPPED | OUTPATIENT
Start: 2023-02-04 | End: 2023-01-31

## 2023-01-31 RX ORDER — DOXYCYCLINE 100 MG/1
100 CAPSULE ORAL EVERY 12 HOURS SCHEDULED
Qty: 9 CAPSULE | Refills: 0 | Status: SHIPPED | OUTPATIENT
Start: 2023-01-31 | End: 2023-02-05

## 2023-01-31 RX ORDER — METHYLPREDNISOLONE 4 MG/1
TABLET ORAL
Qty: 21 TABLET | Refills: 0 | Status: SHIPPED | OUTPATIENT
Start: 2023-02-02 | End: 2023-01-31

## 2023-01-31 RX ORDER — METHYLPREDNISOLONE 4 MG/1
TABLET ORAL
Qty: 21 TABLET | Refills: 0 | Status: SHIPPED | OUTPATIENT
Start: 2023-02-01 | End: 2023-01-31

## 2023-01-31 RX ORDER — METHYLPREDNISOLONE 4 MG/1
TABLET ORAL
Qty: 21 TABLET | Refills: 0 | Status: SHIPPED | OUTPATIENT
Start: 2023-01-31 | End: 2023-01-31

## 2023-01-31 RX ADMIN — BUDESONIDE 0.5 MG: 0.5 INHALANT ORAL at 07:11

## 2023-01-31 RX ADMIN — NYSTATIN 1 APPLICATION: 100000 CREAM TOPICAL at 09:15

## 2023-01-31 RX ADMIN — ACETAMINOPHEN 650 MG: 325 TABLET, FILM COATED ORAL at 05:31

## 2023-01-31 RX ADMIN — METHYLPREDNISOLONE 4 MG: 4 TABLET ORAL at 07:08

## 2023-01-31 RX ADMIN — SODIUM CHLORIDE SOLN NEBU 7% 4 ML: 7 NEBU SOLN at 07:11

## 2023-01-31 RX ADMIN — FLUTICASONE PROPIONATE 2 SPRAY: 50 SPRAY, METERED NASAL at 09:12

## 2023-01-31 RX ADMIN — Medication 10 ML: at 09:10

## 2023-01-31 RX ADMIN — DOXYCYCLINE 100 MG: 100 CAPSULE ORAL at 04:16

## 2023-01-31 RX ADMIN — METHYLPREDNISOLONE 4 MG: 4 TABLET ORAL at 14:35

## 2023-01-31 RX ADMIN — IPRATROPIUM BROMIDE AND ALBUTEROL SULFATE 3 ML: 2.5; .5 SOLUTION RESPIRATORY (INHALATION) at 07:11

## 2023-01-31 RX ADMIN — APIXABAN 5 MG: 5 TABLET, FILM COATED ORAL at 09:11

## 2023-01-31 RX ADMIN — GUAIFENESIN 1200 MG: 600 TABLET, EXTENDED RELEASE ORAL at 09:11

## 2023-01-31 RX ADMIN — DOXYCYCLINE 100 MG: 100 CAPSULE ORAL at 16:47

## 2023-01-31 RX ADMIN — AZELASTINE HYDROCHLORIDE 2 SPRAY: 137 SPRAY, METERED NASAL at 09:12

## 2023-01-31 RX ADMIN — FLUOXETINE HYDROCHLORIDE 20 MG: 20 CAPSULE ORAL at 09:10

## 2023-01-31 RX ADMIN — PANTOPRAZOLE SODIUM 40 MG: 40 TABLET, DELAYED RELEASE ORAL at 09:10

## 2023-01-31 NOTE — OUTREACH NOTE
Prep Survey    Flowsheet Row Responses   [facility] patient discharged from? [place]   Is LACE score < 7 ? No   Eligibility [facility]   Date of Admission 01/28/23   Date of Discharge 01/31/23   Discharge Disposition Home or Self Care   Discharge diagnosis NSTEMI,    cardiac catheterization    Does the patient have one of the following disease processes/diagnoses(primary or secondary)? Acute MI (STEMI,NSTEMI)   Does the patient have Home health ordered? Yes   What is the Home health agency?  [facility]   Is there a DME ordered? No   Prep survey completed? Yes          KAYLEE FUENTES - Registered Nurse

## 2023-02-01 ENCOUNTER — PATIENT OUTREACH (OUTPATIENT)
Dept: CASE MANAGEMENT | Facility: OTHER | Age: 83
End: 2023-02-01
Payer: MEDICARE

## 2023-02-01 ENCOUNTER — TRANSITIONAL CARE MANAGEMENT TELEPHONE ENCOUNTER (OUTPATIENT)
Dept: CALL CENTER | Facility: HOSPITAL | Age: 83
End: 2023-02-01
Payer: MEDICARE

## 2023-02-01 ENCOUNTER — TELEPHONE (OUTPATIENT)
Dept: CARDIOLOGY | Facility: CLINIC | Age: 83
End: 2023-02-01
Payer: MEDICARE

## 2023-02-01 ENCOUNTER — NURSE TRIAGE (OUTPATIENT)
Dept: CALL CENTER | Facility: HOSPITAL | Age: 83
End: 2023-02-01
Payer: MEDICARE

## 2023-02-01 NOTE — OUTREACH NOTE
AMBULATORY CASE MANAGEMENT NOTE    Name and Relationship of Patient/Support Person: Horacio Tony OSCAR - Self    Adult Patient Profile  Questions/Answers    Flowsheet Row Most Recent Value   Symptoms/Conditions Managed at Home respiratory, cardiovascular, gastrointestinal   Barriers to Managing Health age, understanding health advice   Cardiovascular Symptoms/Conditions myocardial infarction   Cardiovascular Management Strategies medication therapy   Cardiovascular Self-Management Outcome 4 (good)   Cardiovascular Comment he cancelled f/u appt with cardiology and thinks he does not need to follow up and declines assist for another follow up appt   Gastrointestinal Symptoms/Conditions reflux/heartburn   Gastrointestinal Management Strategies medication therapy   Gastrointestinal Self-Management Outcome 3 (uncertain)   Respiratory Symptoms/Conditions COPD, cough, shortness of breath   Respiratory Management Strategies oxygen therapy, breathing techniques, breathing exercise, medication therapy   Oxygen Therapy Device nasal cannula   Oxygen Therapy Times night time only   Oxygen Flow (L/min) 2-3 L   Respiratory Self-Management Outcome 4 (good)   Taking Medications Not Prescribed yes, vitamins and/or minerals   Vitamins/Minerals calcium, magnesium, Vitamin B12  [boost supplements sometimes]   Missed Doses of Prescribed Medications During Past Week no   Taken Prescribed Medications at Different Time or Schedule During Past Week no   Taken More or Less Medication Than Prescribed no   Barriers to Taking Medication as Prescribed none  [[facility] nurse assisted with pill reminders and he states this has helped him remember and looks at calander and clock]   Hearing Difficulty or Deaf no   Wear Glasses or Blind yes   Vision Management reading glasses   Concentrating, Remembering or Making Decisions Difficulty yes   Concentration Management writes things down to manage memory issues   Difficulty Communicating no   Difficulty  Eating/Swallowing no   Walking or Climbing Stairs Difficulty no   Dressing/Bathing Difficulty no   Doing Errands Independently Difficulty (such as shopping) yes  [manges money and shops independently needs transportation]   Errands Management neighbor gives him rides   Equipment Currently Used at Home rollator, nebulizer, oxygen, respiratory supplies, grab bar, shower chair  [prefers [employer] and they supply O2]   Change in Functional Status Since Onset of Current Illness/Injury no   Spiritual, Cultural Beliefs, Voodoo Practices, Values that Affect Care no   Advance Directive Status Patient has advance directive, copy requested  [wants to be a full code]   Nutrition Risk Screen no indicators present   Have you fallen in the past year? No   Do you feel unsteady with walking? Yes  [if having low back pain he feels unsteady]   Are you worried about falling? No   Have you been advised to use a cane or a walker to get around safely? 1   Do you steady yourself by holding onto furniture when walking at home? 0   Do you need to push with your hands to stand up from a chair? 0   Do you have trouble stepping up onto a curb? 0   Do you have to rush to the toilet? 1  [taking lasix]   Have you lost some feeling in your feet? 0   Do you take medicine that sometimes makes you feel light headed or more tired than usual? 0   Do you take medicine to help you sleep or improve your mood? 0   Do you often feel sad or depressed? 0   Patient Fall Risk Assessment Score  2   Fall Risk Category Moderate        SDOH updated and reviewed with the patient during this program:  Financial Resource Strain: Low Risk    • Difficulty of Paying Living Expenses: Not very hard      Food Insecurity: No Food Insecurity   • Worried About Running Out of Food in the Last Year: Never true   • Ran Out of Food in the Last Year: Never true      Social Connections: Moderately Isolated   • Frequency of Communication with Friends and Family: More than three times  a week   • Frequency of Social Gatherings with Friends and Family: More than three times a week   • Attends Gnosticism Services: More than 4 times per year   • Active Member of Clubs or Organizations: No   • Attends Club or Organization Meetings: Never   • Marital Status:       Transportation Needs: Unmet Transportation Needs   • Lack of Transportation (Medical): Yes   • Lack of Transportation (Non-Medical): Yes      Housing Stability: Low Risk    • Unable to Pay for Housing in the Last Year: No   • Number of Places Lived in the Last Year: 1   • Unstable Housing in the Last Year: No      Stress: Stress Concern Present   • Feeling of Stress : To some extent       Education Documentation  Risk Factors, taught by Azeb Cook RN at 2/1/2023  2:22 PM.  Learner: Patient  Readiness: Acceptance  Method: Explanation, Teach Back  Response: Verbalizes Understanding    Provider Follow-Up, taught by Azeb Cook RN at 2/1/2023  2:22 PM.  Learner: Patient  Readiness: Acceptance  Method: Explanation, Teach Back  Response: Verbalizes Understanding    Signs/Symptoms, taught by Azeb Cook RN at 2/1/2023  2:22 PM.  Learner: Patient  Readiness: Acceptance  Method: Explanation, Teach Back  Response: Verbalizes Understanding    Description, taught by Azeb Cook RN at 2/1/2023  2:22 PM.  Learner: Patient  Readiness: Acceptance  Method: Explanation, Teach Back  Response: Verbalizes Understanding    When to Seek Medical Attention, taught by Azeb Cook RN at 2/1/2023  2:22 PM.  Learner: Patient  Readiness: Acceptance  Method: Explanation, Teach Back  Response: Verbalizes Understanding    Safety, taught by Azeb Cook RN at 2/1/2023  2:22 PM.  Learner: Patient  Readiness: Acceptance  Method: Explanation, Teach Back  Response: Verbalizes Understanding    Medication Management, taught by Azeb Cook RN at 2/1/2023  2:22 PM.  Learner: Patient  Readiness: Acceptance  Method: Explanation, Teach  Back  Response: Verbalizes Understanding    Home Safety, taught by Azeb Cook, RN at 2/1/2023  2:22 PM.  Learner: Patient  Readiness: Acceptance  Method: Explanation, Teach Back  Response: Verbalizes Understanding    Energy Conservation, taught by Azeb Cook RN at 2/1/2023  2:22 PM.  Learner: Patient  Readiness: Acceptance  Method: Explanation, Teach Back  Response: Verbalizes Understanding    Unresolved/Worsening Symptoms, taught by Azeb Cook RN at 2/1/2023  2:22 PM.  Learner: Patient  Readiness: Acceptance  Method: Explanation, Teach Back  Response: Verbalizes Understanding    Fluid/Food Intake, taught by Azeb Cook RN at 2/1/2023  2:22 PM.  Learner: Patient  Readiness: Acceptance  Method: Explanation, Teach Back  Response: Verbalizes Understanding    Coping, taught by Azeb Cook RN at 2/1/2023  2:22 PM.  Learner: Patient  Readiness: Acceptance  Method: Explanation, Teach Back  Response: Verbalizes Understanding    Signs/Symptoms, taught by Azeb Cook RN at 2/1/2023  2:22 PM.  Learner: Patient  Readiness: Acceptance  Method: Explanation, Teach Back  Response: Verbalizes Understanding      Patient Outreach    Outgoing call to the patient for follow up from recent [facility] admission.  MADAN OLIVAS is current.  Patient adamant that he does not need to follow up with cardiology and he cancelled his appointment.  Education provided for NSTEMI and he will now consider but prefers to make appointment himself.  Discussed having sons provide transport or providing cab transport for his medical appointments and have discussed this with his son, Stef, previously.  He prefers a follow up in 3-4 weeks and this has been scheduled.      AZEB PHAM  Ambulatory Case Management    2/1/2023, 14:23 EST

## 2023-02-01 NOTE — TELEPHONE ENCOUNTER
Caller: Tony Leonard    Relationship to patient: Self    Best call back number: [phone]    Patient is needing: PATIENT CANCELED FOLLOW UP POST CATH BECAUSE HE FELT LIKE HE DID NOT NEED IT.

## 2023-02-01 NOTE — TELEPHONE ENCOUNTER
"  Reason for Disposition  • General information question, no triage required and triager able to answer question    Additional Information  • Negative: [1] Caller is not with the adult (patient) AND [2] reporting urgent symptoms  • Negative: Lab result questions  • Negative: Medication questions  • Negative: Caller can't be reached by phone  • Negative: Caller has already spoken to PCP or another triager  • Negative: RN needs further essential information from caller in order to complete triage  • Negative: Requesting regular office appointment  • Negative: [1] Caller requesting NON-URGENT health information AND [2] PCP's office is the best resource  • Negative: Health Information question, no triage required and triager able to answer question    Answer Assessment - Initial Assessment Questions  1. REASON FOR CALL or QUESTION: \"What is your reason for calling today?\" or \"How can I best help you?\" or \"What question do you have that I can help answer?\"   Was taking to someone she was asking a lot of questions. Got cut off and would like a call back  Epic noes reviewed and appears was taking with Case management    Will send note to case management high priority.    Protocols used: INFORMATION ONLY CALL - NO TRIAGE-ADULT-    "

## 2023-02-01 NOTE — OUTREACH NOTE
Case Management Call Center Follow-up    Flowsheet Row Responses   [facility] Call Center Tracking Reason? Other (specify in comments)   Other Tracking Comments see note   Has the Call Center Case Management Follow-up issue been resolved? No   Follow-up Comments I am unsure who called him. I was looking throught he notes but cannot figure it out. Hopefully they will call him back if it is important.  I called and let the patient know I was unsure and he said he will just see if they call back.          Shannon Epley, RN    2/1/2023, 15:17 CST

## 2023-02-01 NOTE — CASE MANAGEMENT/SOCIAL WORK
Case Management Discharge Note      Final Note: Pt discharged home with [facility]ENTHAL /ANUSHA CM.         Selected Continued Care - Discharged on 1/31/2023 Admission date: 1/28/2023 - Discharge disposition: Home or Self Care    Destination    No services have been selected for the patient.              Durable Medical Equipment    No services have been selected for the patient.              Dialysis/Infusion    No services have been selected for the patient.              Home Medical Care Coordination complete.    Service Provider Selected Services Address Phone Fax Patient Preferred    [facility] [address] [address] [phone] --          Therapy    No services have been selected for the patient.              Community Resources    No services have been selected for the patient.              Community & DME    No services have been selected for the patient.                Selected Continued Care - Episodes Includes continued care and service providers with selected services from the active episodes listed below    High Risk Care Management Episode start date: 12/13/2022   There are no active outsourced providers for this episode.             Selected Continued Care - Prior Encounters Includes continued care and service providers with selected services from prior encounters from 10/30/2022 to 1/31/2023    Discharged on 1/6/2023 Admission date: 12/29/2022 - Discharge disposition: Home-Health Care [facility]    Home Medical Care     Service Provider Selected Services Address Phone Fax Patient Preferred    [facility] [address] [address] [address] [phone] --                Discharged on 12/12/2022 Admission date: 12/9/2022 - Discharge disposition: Home or Self Care    Home Medical Care     Service Provider Selected Services Address Phone Fax Patient Preferred    [facility] [address] [address] [phone] --                Discharged on 12/1/2022 Admission date: 11/26/2022 - Discharge disposition: Skilled Nursing Facility (DC - External)    Destination     Service Provider Selected Services Address Phone Fax Patient Preferred    [facility] [address] [facility] [address] [phone] --                Discharged on 11/23/2022 Admission date: 11/21/2022 - Discharge disposition: Skilled Nursing Facility (DC - External)    Destination     Service Provider Selected Services Address Phone Fax Patient Preferred    [facility] [address] [facility] [address] [phone] --                Discharged on 11/19/2022 Admission date: 11/13/2022 - Discharge disposition: Home-Health Care [facility]    Home Medical Care     Service Provider Selected Services Address Phone Fax Patient Preferred    [facility] [address] [address] [phone] --                         Final Discharge Disposition Code: 06 - home with home health care

## 2023-02-01 NOTE — OUTREACH NOTE
Call Center TCM Note    Flowsheet Row Responses   [facility] patient discharged from? [place]   Does the patient have one of the following disease processes/diagnoses(primary or secondary)? Acute MI (STEMI,NSTEMI)   TCM attempt successful? Yes   Call start time 1545   Call end time 1548   Discharge diagnosis NSTEMI,    cardiac catheterization    Meds reviewed with patient/caregiver? Yes   Is the patient having any side effects they believe may be caused by any medication additions or changes? No   Does the patient have all prescriptions related to this admission filled (includes statins,anticoagulants,HTN meds,anti-arrhythmia meds) Yes   Is the patient taking all medications as directed (includes completed medication regime)? Yes   Does the patient have an appointment with their PCP within 7 days of discharge? No   Nursing Interventions Routed TCM call to PCP office   What is the [facility] health agency?  [facility]   Has home health visited the patient within 72 hours of discharge? Yes   Psychosocial issues? No   Did the patient receive a copy of their discharge instructions? Yes   Nursing interventions Reviewed instructions with patient   What is the patient's perception of their health status since discharge? Improving   Nursing interventions Nurse provided patient education   Is the patient/caregiver able to teach back lifestyle changes to help prevent MIs Managing diabetes, Reducing stress, Regular exercise as approved by provider, Heart healthy diet, Limiting alcohol intake, Maintaining a healthy weight   Is the patient/caregiver able to teach back ways to prevent a second heart attack: Take medications, Manage risk factors, Get support (AHA website:supportnetwork.heart.org), Follow up with MD, Participate in Cardiac Rehab   TCM call completed? Yes   Wrap up additional comments D/C DX,  NSTEMI,    cardiac catheterization - Pt doing pretty well, tired. New rx's Doxycycline, MDP in place. Pt home nebulizer  "\"broke\" so he has called for new one. [facility] resuming care. MARY COLES follow up 02/09/2023, pt has HOSP follow up with PCP Dr Aviles on 02/17/2023, this date is past applicable time frame for TCM APPT, which would need to be completed by 02/14/2023   Call end time 1830          Yessica Cheema MA    2/1/2023, 15:51 EST      "

## 2023-02-01 NOTE — OUTREACH NOTE
Call Center TCM Note    Flowsheet Row Responses   [facility] patient discharged from? [place]   Does the patient have one of the following disease processes/diagnoses(primary or secondary)? Acute MI (STEMI,NSTEMI)   TCM attempt successful? No   Unsuccessful attempts Attempt 1   Call Status Left message          Yessica Cheema MA    2/1/2023, 09:23 EST

## 2023-02-02 LAB — ACT BLD: 143 SECONDS (ref 82–152)

## 2023-02-06 ENCOUNTER — TELEPHONE (OUTPATIENT)
Dept: FAMILY MEDICINE CLINIC | Facility: CLINIC | Age: 83
End: 2023-02-06
Payer: MEDICARE

## 2023-02-06 NOTE — TELEPHONE ENCOUNTER
Caller: VICKY CASEY    Relationship: Emergency Contact    Best call back number: [phone] (Mobile) OK TO LEAVE VM    What is the best time to reach you: ANYTIME, ASAP    Who are you requesting to speak with (clinical staff, provider,  specific staff member): CLINICAL STAFF/ DR BOYD    Do you know the name of the person who called: VICKY CASEY, ON  [address]    What was the call regarding: PATIENT'S SON STATES THE PATIENT IS A DANGER TO HIMSELF AND OTHERS WHILE DRIVING AND THE PATIENT'S SON IS ASKING DR BOYD WHAT THE NEXT STEPS SHOULD BE TO KEEP THE PATIENT FROM DRIVING, THE PATIENT TOTALED HIS CAR ABOUT THREE WEEKS AGO,     PATIENT'S SON HAS BEEN WORKING WITH THE [facility] LIAISON NORBERTO -[phone] TO HELP GET THE PATIENT INTO ASSISTED LIVING ALSO BUT THE PATIENT IS NOW REFUSING THIS, PLEASE CALL THE PATIENT'S SON BACK REGARDING ALL OF THIS ASAP    Do you require a callback: YES, ASAP

## 2023-02-06 NOTE — TELEPHONE ENCOUNTER
I CALLED AND S/W PT.  I EXPLAINED TO HIM THAT WE NEEDED HIM TO KEEP HIS FOLLOW UP APPT, SO THAT HIS CATH SITE CAN BE CHECKED AS WELL.  HE STATED THAT HE WOULD CALL BACK TO SCHEDULE.

## 2023-02-08 ENCOUNTER — READMISSION MANAGEMENT (OUTPATIENT)
Dept: CALL CENTER | Facility: HOSPITAL | Age: 83
End: 2023-02-08
Payer: MEDICARE

## 2023-02-08 NOTE — OUTREACH NOTE
AMI Week 2 Survey    Flowsheet Row Responses   [facility] patient discharged from? [place]   Does the patient have one of the following disease processes/diagnoses(primary or secondary)? Acute MI (STEMI,NSTEMI)   Week 2 attempt successful? Yes   Call start time 1610   Call end time 1613   Discharge diagnosis NSTEMI,    cardiac catheterization    Does the patient have a primary care provider?  Yes   Has the patient kept scheduled appointments due by today? Yes   What is the [facility] health agency[facility]   Has home health visited the patient within 72 hours of discharge? Yes   Comments Pt reports no chest pain but reports continued joint/back pain. He reports a desire for epidural for pain in back.    What is the patient's perception of their health status since discharge? Same   Week 2 call completed? Yes          Yolanda DC - Registered Nurse

## 2023-02-16 ENCOUNTER — READMISSION MANAGEMENT (OUTPATIENT)
Dept: CALL CENTER | Facility: HOSPITAL | Age: 83
End: 2023-02-16
Payer: MEDICARE

## 2023-02-16 NOTE — OUTREACH NOTE
AMI Week 3 Survey    Flowsheet Row Responses   [facility] patient discharged from? [place]   Does the patient have one of the following disease processes/diagnoses(primary or secondary)? Acute MI (STEMI,NSTEMI)   Week 3 attempt successful? No   Unsuccessful attempts Attempt 1   Revoke Other transitional program  [pt followed by ACM ]          CAITLYN VILLAR - Registered Nurse

## 2023-02-17 ENCOUNTER — OFFICE VISIT (OUTPATIENT)
Dept: FAMILY MEDICINE CLINIC | Facility: CLINIC | Age: 83
End: 2023-02-17
Payer: MEDICARE

## 2023-02-17 VITALS
HEIGHT: 71 IN | BODY MASS INDEX: 25.4 KG/M2 | SYSTOLIC BLOOD PRESSURE: 128 MMHG | DIASTOLIC BLOOD PRESSURE: 68 MMHG | HEART RATE: 76 BPM | OXYGEN SATURATION: 96 % | WEIGHT: 181.4 LBS | TEMPERATURE: 97.5 F

## 2023-02-17 DIAGNOSIS — I21.4 NSTEMI (NON-ST ELEVATED MYOCARDIAL INFARCTION): ICD-10-CM

## 2023-02-17 DIAGNOSIS — I48.0 PAROXYSMAL ATRIAL FIBRILLATION: ICD-10-CM

## 2023-02-17 DIAGNOSIS — Z00.00 MEDICARE ANNUAL WELLNESS VISIT, SUBSEQUENT: Primary | ICD-10-CM

## 2023-02-17 PROCEDURE — G0439 PPPS, SUBSEQ VISIT: HCPCS | Performed by: INTERNAL MEDICINE

## 2023-02-17 PROCEDURE — 1170F FXNL STATUS ASSESSED: CPT | Performed by: INTERNAL MEDICINE

## 2023-02-17 PROCEDURE — 1159F MED LIST DOCD IN RCRD: CPT | Performed by: INTERNAL MEDICINE

## 2023-02-17 RX ORDER — CLOTRIMAZOLE AND BETAMETHASONE DIPROPIONATE 10; .64 MG/G; MG/G
1 CREAM TOPICAL 2 TIMES DAILY
Qty: 45 G | Refills: 1 | Status: SHIPPED | OUTPATIENT
Start: 2023-02-17

## 2023-02-17 RX ORDER — GABAPENTIN 300 MG/1
300 CAPSULE ORAL 3 TIMES DAILY
Qty: 270 CAPSULE | Refills: 1 | Status: SHIPPED | OUTPATIENT
Start: 2023-02-17 | End: 2023-02-28 | Stop reason: SDUPTHER

## 2023-02-17 RX ORDER — KETOCONAZOLE 20 MG/G
CREAM TOPICAL
COMMUNITY
Start: 2023-01-25 | End: 2023-02-17 | Stop reason: ALTCHOICE

## 2023-02-17 RX ORDER — METHYLPHENIDATE HYDROCHLORIDE 10 MG/1
10 TABLET ORAL DAILY
Qty: 30 TABLET | Refills: 0 | Status: SHIPPED | OUTPATIENT
Start: 2023-02-17

## 2023-02-17 RX ORDER — NAPROXEN 500 MG/1
500 TABLET ORAL 2 TIMES DAILY WITH MEALS
Qty: 180 TABLET | Refills: 3 | Status: SHIPPED | OUTPATIENT
Start: 2023-02-17

## 2023-02-17 NOTE — PROGRESS NOTES
QUICK REFERENCE INFORMATION:  The ABCs of the Annual Wellness Visit    Subsequent Medicare Wellness Visit Current outpatient and discharge medications have been reconciled for the patient.  Reviewed by: Erich Aviles MD1/28/2023-1/31/2023  Patient was seen for Medicare wellness exam.  Patient is also being seen for hospital follow-up.  Patient was in the hospital with myocardial infarction.  It was a non-STEMI.  Patient was informed that his Ritalin could cause him to have a heart attack and also the naproxen could cause coronary artery clots.  Patient understands but insists he cannot function without them.  Patient states he is totally unable to concentrate without Ritalin and his pain is a great in his back and legs that he has to have naproxen.    Dictated utilizing Dragon dictation. If there are questions or for further clarification, please contact me.      HEALTH RISK ASSESSMENT    1940    Recent Hospitalizations:  Recently treated at the following:  [facility].        Current Medical Providers:  Patient Care Team:  Erich Aviles MD as PCP - General (Internal Medicine)  Katharina Salter MD as Consulting Physician (Pulmonary Disease)  Anum Bhatt MD as Consulting Physician (Pain Medicine)  Azeb Cook, RN as Ambulatory  (Population Health)  Otilia Shepard MSW as  (Amb Case Mgmt) (Population Health)        Smoking Status:  Social History     Tobacco Use   Smoking Status Never   Smokeless Tobacco Never       Alcohol Consumption:  Social History     Substance and Sexual Activity   Alcohol Use No    Comment: red wine occassional       Depression Screen:   PHQ-2/PHQ-9 Depression Screening 2/17/2023   Retired PHQ-9 Total Score -   Retired Total Score -   Little Interest or Pleasure in Doing Things 0-->not at all   Feeling Down, Depressed or Hopeless 0-->not at all   PHQ-9: Brief Depression Severity Measure Score 0       Health Habits and Functional and  Cognitive Screening:  Functional & Cognitive Status 2/17/2023   Do you have difficulty preparing food and eating? No   Do you have difficulty bathing yourself, getting dressed or grooming yourself? No   Do you have difficulty using the toilet? No   Do you have difficulty moving around from place to place? Yes   Do you have trouble with steps or getting out of a bed or a chair? Yes   Current Diet Well Balanced Diet   Dental Exam Up to date   Eye Exam Up to date   Exercise (times per week) 7 times per week   Current Exercises Include Aerobics   Current Exercise Activities Include -   Do you need help using the phone?  No   Are you deaf or do you have serious difficulty hearing?  Yes   Do you need help with transportation? No   Do you need help shopping? No   Do you need help preparing meals?  No   Do you need help with housework?  No   Do you need help with laundry? No   Do you need help taking your medications? No   Do you need help managing money? No   Do you ever drive or ride in a car without wearing a seat belt? No   Have you felt unusual stress, anger or loneliness in the last month? No   Who do you live with? Alone   If you need help, do you have trouble finding someone available to you? No   Have you been bothered in the last four weeks by sexual problems? No   Do you have difficulty concentrating, remembering or making decisions? Yes           Does the patient have evidence of cognitive impairment? No    Aspirin use counseling: Does not need ASA (and currently is not on it)      Recent Lab Results:  CMP:  Lab Results   Component Value Date    GLU 92 10/02/2020    BUN 7 (L) 01/30/2023    CREATININE 0.78 01/30/2023    EGFRIFNONA 79 02/15/2022    BCR 9.0 01/30/2023     01/30/2023    K 4.0 01/30/2023    CO2 22.0 01/30/2023    CALCIUM 8.9 01/30/2023    PROTENTOTREF 5.8 (L) 04/19/2017    ALBUMIN 3.4 (L) 01/29/2023    LABGLOBREF 2.5 04/19/2017    LABIL2 1.4 10/02/2020    BILITOT 0.4 01/29/2023    ALKPHOS 61  01/29/2023    AST 25 01/29/2023    ALT 15 01/29/2023     Lipid Panel:  Lab Results   Component Value Date    CHOL 183 02/15/2022    TRIG 67 02/15/2022    HDL 58 02/15/2022    VLDL 13 02/15/2022    LDLHDL 1.92 02/15/2022     HbA1c:  Lab Results   Component Value Date    HGBA1C 5.39 04/04/2017       Visual Acuity:  No results found.    Age-appropriate Screening Schedule:  Refer to the list below for future screening recommendations based on patient's age, sex and/or medical conditions. Orders for these recommended tests are listed in the plan section. The patient has been provided with a written plan.    Health Maintenance   Topic Date Due   • PROSTATE CANCER SCREENING  08/01/2020   • TDAP/TD VACCINES (2 - Td or Tdap) 02/17/2023 (Originally 1/1/2023)   • INFLUENZA VACCINE  03/31/2023 (Originally 8/1/2022)   • ZOSTER VACCINE (2 of 2) 02/17/2024 (Originally 9/26/2016)        Subjective   History of Present Illness    Tony Leonard is a 82 y.o. male who presents for an Subsequent Wellness Visit.    The following portions of the patient's history were reviewed and updated as appropriate: allergies, current medications, past family history, past medical history, past social history, past surgical history and problem list.    Outpatient Medications Prior to Visit   Medication Sig Dispense Refill   • acetaminophen (TYLENOL) 500 MG tablet Take 500 mg by mouth Every 6 (Six) Hours As Needed for Mild Pain.     • albuterol (PROVENTIL) (2.5 MG/3ML) 0.083% nebulizer solution Take 2.5 mg by nebulization Every 6 (Six) Hours As Needed for Wheezing. 360 mL 3   • apixaban (ELIQUIS) 5 MG tablet tablet Take 1 tablet by mouth Every 12 (Twelve) Hours. Indications: DVT/PE (active thrombosis) 60 tablet 1   • azelastine (ASTELIN) 0.1 % nasal spray 2 sprays into the nostril(s) as directed by provider 2 (Two) Times a Day. Use in each nostril as directed 30 mL 0   • budesonide (PULMICORT) 180 MCG/ACT inhaler Inhale 2 puffs 2 (Two) Times a Day.  1 each 3   • calcium carbonate (TUMS) 500 MG chewable tablet Chew 1 tablet As Needed for Indigestion or Heartburn.     • FLUoxetine (PROzac) 20 MG capsule TAKE 1 CAPSULE DAILY 90 capsule 3   • fluticasone (FLONASE) 50 MCG/ACT nasal spray 2 sprays by Each Nare route Daily. 11.1 mL 1   • guaifenesin (ROBITUSSIN) 100 MG/5ML liquid Take 10 mL by mouth Every 4 (Four) Hours As Needed for Cough. 118 mL 1   • Multiple Vitamins-Minerals (MULTIVITAMIN ADULT PO) Take 1 tablet by mouth Daily.     • pantoprazole (PROTONIX) 40 MG EC tablet Take 1 tablet by mouth Daily. 30 tablet 0   • potassium chloride (K-DUR,KLOR-CON) 20 MEQ CR tablet Take 1 tablet by mouth Daily.     • rOPINIRole (REQUIP) 0.5 MG tablet Take 1 tablet by mouth Every Night. Take 1 hour before bedtime. (Patient taking differently: Take 0.5 mg by mouth Every Night.) 90 tablet 1   • sodium chloride 7 % nebulizer solution nebulizer solution Inhale 1 vial 2 (Two) Times a Day.     • terbinafine (lamiSIL) 250 MG tablet Take 250 mg by mouth Daily.     • gabapentin (NEURONTIN) 300 MG capsule Take 300 mg by mouth 3 (Three) Times a Day.     • methylphenidate (RITALIN) 10 MG tablet Take 10 mg by mouth Daily.     • ketoconazole (NIZORAL) 2 % cream APPLY A SMALL AMOUNT TO THE AFFECTED AREA(S) TWICE DAILY AS DIRECTED     • methylPREDNISolone (MEDROL) 4 MG dose pack Take as directed on package instructions. 21 tablet 0     No facility-administered medications prior to visit.       Patient Active Problem List   Diagnosis   • Thrush, oral   • ADD (attention deficit disorder)   • Hemorrhoids   • Bronchiectasis with acute lower respiratory infection (HCC)   • Diverticul disease small and large intestine, no perforati or abscess   • Benign non-nodular prostatic hyperplasia without lower urinary tract symptoms   • Gastroesophageal reflux disease   • Malaise and fatigue   • Environmental allergies   • Hemoptysis   • Dyslipidemia   • Primary osteoarthritis involving multiple joints   •  Pneumonia of right lower lobe due to infectious organism   • Abnormal EKG   • COPD (chronic obstructive pulmonary disease) (HCC)   • Mild malnutrition (HCC)   • Acute on chronic respiratory failure with hypoxia (HCC)   • Fracture, intertrochanteric, right femur, closed, initial encounter ([facility])   • Chronic diastolic CHF (congestive heart failure) (HCC)   • Hematoma of frontal scalp   • Postoperative anemia due to acute blood loss   • Urinary retention   • Hemorrhagic disorder due to circulating anticoagulants ([facility])   • History of fracture of right hip   • Closed fracture of right hip with routine healing   • Chronic low back pain with sciatica   • Change in bowel function   • Rectal bleeding   • Prostate cancer (HCC)   • On home oxygen therapy   • Acute viral bronchitis   • Peripheral neuropathy   • Plantar fasciitis   • COPD exacerbation ([facility])   • Bilateral lower extremity edema   • Microscopic hematuria   • Acute midline low back pain with right-sided sciatica   • Spinal stenosis, lumbar region with neurogenic claudication   • Compression deformity of vertebra   • Rectal bleed   • Esophagitis   • Angiectasia   • Hiatal hernia   • Overweight (BMI 25.0-29.9)   • Leukocytosis   • Bilateral hip pain   • Elevated troponin level not due myocardial infarction   • Nocturnal hypoxia   • Pneumonia of right upper lobe due to infectious organism   • NSTEMI (non-ST elevated myocardial infarction) ([facility])   • Chronic respiratory failure with hypoxia (HCC)   • Paroxysmal atrial fibrillation (HCC)       Advance Care Planning:  ACP discussion was held with the patient during this visit. Patient has an advance directive in EMR which is still valid.     Identification of Risk Factors:  Risk factors include: NA.    Review of Systems   Constitutional: Negative for fatigue and fever.   HENT: Positive for congestion. Negative for trouble swallowing.    Eyes: Negative for discharge and visual disturbance.   Respiratory: Negative for choking  and shortness of breath.    Cardiovascular: Negative for chest pain and palpitations.   Gastrointestinal: Negative for abdominal pain and blood in stool.   Endocrine: Negative.    Genitourinary: Negative for genital sores and hematuria.   Musculoskeletal: Negative for gait problem and joint swelling.   Skin: Negative for color change, pallor, rash and wound.   Allergic/Immunologic: Positive for environmental allergies. Negative for immunocompromised state.   Neurological: Negative for facial asymmetry and speech difficulty.   Psychiatric/Behavioral: Negative for hallucinations and suicidal ideas.       Compared to one year ago, the patient feels his physical health is worse.  Compared to one year ago, the patient feels his mental health is worse.    Objective     Physical Exam  Vitals and nursing note reviewed.   Constitutional:       Appearance: Normal appearance. He is well-developed.   HENT:      Head: Normocephalic and atraumatic.      Nose: Nose normal.      Mouth/Throat:      Mouth: Mucous membranes are moist.      Pharynx: Oropharynx is clear.   Eyes:      Extraocular Movements: Extraocular movements intact.      Conjunctiva/sclera: Conjunctivae normal.      Pupils: Pupils are equal, round, and reactive to light.   Cardiovascular:      Rate and Rhythm: Normal rate and regular rhythm.      Heart sounds: Normal heart sounds. No murmur heard.    No friction rub. No gallop.   Pulmonary:      Effort: Pulmonary effort is normal. No respiratory distress.      Breath sounds: Normal breath sounds. No stridor. No wheezing, rhonchi or rales.   Chest:      Chest wall: No tenderness.   Abdominal:      General: Bowel sounds are normal.      Palpations: Abdomen is soft.   Musculoskeletal:         General: Normal range of motion.      Cervical back: Normal range of motion and neck supple.   Skin:     General: Skin is warm and dry.   Neurological:      General: No focal deficit present.      Mental Status: He is alert and  "oriented to person, place, and time. Mental status is at baseline.   Psychiatric:         Mood and Affect: Mood normal.         Behavior: Behavior normal.         Thought Content: Thought content normal.         Judgment: Judgment normal.         Vitals:    02/17/23 1100   BP: 128/68   Pulse: 76   Temp: 97.5 °F (36.4 °C)   SpO2: 96%   Weight: 82.3 kg (181 lb 6.4 oz)   Height: 180.3 cm (71\")       BMI is >= 25 and <30. (Overweight) The following options were offered after discussion;: NA      Assessment & Plan Strongly recommend he stops naproxen and Reglan #2 wellness exam  Patient Self-Management and Personalized Health Advice  The patient has been provided with information about: NA and preventive services including:   · NA.    Visit Diagnoses:    ICD-10-CM ICD-9-CM   1. Medicare annual wellness visit, subsequent  Z00.00 V70.0   2. Paroxysmal atrial fibrillation (HCC)  I48.0 427.31   3. NSTEMI (non-ST elevated myocardial infarction) ([facility])  I21.4 410.70       No orders of the defined types were placed in this encounter.      Outpatient Encounter Medications as of 2/17/2023   Medication Sig Dispense Refill   • acetaminophen (TYLENOL) 500 MG tablet Take 500 mg by mouth Every 6 (Six) Hours As Needed for Mild Pain.     • albuterol (PROVENTIL) (2.5 MG/3ML) 0.083% nebulizer solution Take 2.5 mg by nebulization Every 6 (Six) Hours As Needed for Wheezing. 360 mL 3   • apixaban (ELIQUIS) 5 MG tablet tablet Take 1 tablet by mouth Every 12 (Twelve) Hours. Indications: DVT/PE (active thrombosis) 60 tablet 1   • azelastine (ASTELIN) 0.1 % nasal spray 2 sprays into the nostril(s) as directed by provider 2 (Two) Times a Day. Use in each nostril as directed 30 mL 0   • budesonide (PULMICORT) 180 MCG/ACT inhaler Inhale 2 puffs 2 (Two) Times a Day. 1 each 3   • calcium carbonate (TUMS) 500 MG chewable tablet Chew 1 tablet As Needed for Indigestion or Heartburn.     • FLUoxetine (PROzac) 20 MG capsule TAKE 1 CAPSULE DAILY 90 capsule " 3   • fluticasone (FLONASE) 50 MCG/ACT nasal spray 2 sprays by Each Nare route Daily. 11.1 mL 1   • gabapentin (NEURONTIN) 300 MG capsule Take 1 capsule by mouth 3 (Three) Times a Day. 270 capsule 1   • guaifenesin (ROBITUSSIN) 100 MG/5ML liquid Take 10 mL by mouth Every 4 (Four) Hours As Needed for Cough. 118 mL 1   • methylphenidate (RITALIN) 10 MG tablet Take 1 tablet by mouth Daily. 30 tablet 0   • Multiple Vitamins-Minerals (MULTIVITAMIN ADULT PO) Take 1 tablet by mouth Daily.     • pantoprazole (PROTONIX) 40 MG EC tablet Take 1 tablet by mouth Daily. 30 tablet 0   • potassium chloride (K-DUR,KLOR-CON) 20 MEQ CR tablet Take 1 tablet by mouth Daily.     • rOPINIRole (REQUIP) 0.5 MG tablet Take 1 tablet by mouth Every Night. Take 1 hour before bedtime. (Patient taking differently: Take 0.5 mg by mouth Every Night.) 90 tablet 1   • sodium chloride 7 % nebulizer solution nebulizer solution Inhale 1 vial 2 (Two) Times a Day.     • terbinafine (lamiSIL) 250 MG tablet Take 250 mg by mouth Daily.     • [DISCONTINUED] gabapentin (NEURONTIN) 300 MG capsule Take 300 mg by mouth 3 (Three) Times a Day.     • [DISCONTINUED] methylphenidate (RITALIN) 10 MG tablet Take 10 mg by mouth Daily.     • clotrimazole-betamethasone (Lotrisone) 1-0.05 % cream Apply 1 application topically to the appropriate area as directed 2 (Two) Times a Day. Do not apply to scrotum 45 g 1   • naproxen (Naprosyn) 500 MG tablet Take 1 tablet by mouth 2 (Two) Times a Day With Meals. 180 tablet 3   • [DISCONTINUED] ketoconazole (NIZORAL) 2 % cream APPLY A SMALL AMOUNT TO THE AFFECTED AREA(S) TWICE DAILY AS DIRECTED     • [DISCONTINUED] methylPREDNISolone (MEDROL) 4 MG dose pack Take as directed on package instructions. 21 tablet 0     No facility-administered encounter medications on file as of 2/17/2023.       Reviewed use of high risk medication in the elderly: not applicable  Reviewed for potential of harmful drug interactions in the elderly: not  applicable    Follow Up:  Return in about 4 months (around 6/17/2023), or if symptoms worsen or fail to improve, for Recheck.     An After Visit Summary and PPPS with all of these plans were given to the patient.

## 2023-02-20 ENCOUNTER — TELEPHONE (OUTPATIENT)
Dept: CARDIAC REHAB | Facility: HOSPITAL | Age: 83
End: 2023-02-20
Payer: MEDICARE

## 2023-02-20 NOTE — TELEPHONE ENCOUNTER
"Upon MR review, this patient does not meet the criteria for his recent referral r/t chronic HF.  Unfortunately according to the Medicare/Medicaid (CMS) guidelines in order to be eligible for Cardiac Rehab pts have to be \"stable, chronic HF as defined as pts with LVEF of 35% or less and NYHA Class II to IV symptoms despite being on optimal heart failure therapy for at least six weeks.  Stable pts are defined as pts who have not had recent (< or = 6 wks) or planned (<or = 6 mnths) major cardiovascular hospitalizations or procedures.\"  Additionally we don't believe the patient would be able to participate in our program due to his mobility issues.  We are wondering if he might be more appropriate for PT?  If you feel we have missed something, please let us know.  Thank you for the referral!  "

## 2023-02-21 ENCOUNTER — HOSPITAL ENCOUNTER (OUTPATIENT)
Dept: PAIN MEDICINE | Facility: HOSPITAL | Age: 83
Discharge: HOME | End: 2023-02-21
Payer: MEDICARE

## 2023-02-21 ENCOUNTER — ANESTHESIA EVENT (OUTPATIENT)
Dept: PAIN MEDICINE | Facility: HOSPITAL | Age: 83
End: 2023-02-21
Payer: MEDICARE

## 2023-02-21 ENCOUNTER — ANESTHESIA (OUTPATIENT)
Dept: PAIN MEDICINE | Facility: HOSPITAL | Age: 83
End: 2023-02-21
Payer: MEDICARE

## 2023-02-21 ENCOUNTER — HOSPITAL ENCOUNTER (OUTPATIENT)
Dept: GENERAL RADIOLOGY | Facility: HOSPITAL | Age: 83
Discharge: HOME | End: 2023-02-21
Payer: MEDICARE

## 2023-02-21 VITALS
RESPIRATION RATE: 14 BRPM | SYSTOLIC BLOOD PRESSURE: 141 MMHG | HEART RATE: 80 BPM | OXYGEN SATURATION: 93 % | TEMPERATURE: 98.2 F | DIASTOLIC BLOOD PRESSURE: 79 MMHG

## 2023-02-21 DIAGNOSIS — R52 PAIN: ICD-10-CM

## 2023-02-21 DIAGNOSIS — M48.062 SPINAL STENOSIS, LUMBAR REGION WITH NEUROGENIC CLAUDICATION: Primary | ICD-10-CM

## 2023-02-21 PROCEDURE — 77003 FLUOROGUIDE FOR SPINE INJECT: CPT

## 2023-02-21 PROCEDURE — 25010000002 METHYLPREDNISOLONE PER 80 MG

## 2023-02-21 PROCEDURE — 0 IOPAMIDOL 41 % SOLUTION

## 2023-02-21 RX ORDER — SODIUM CHLORIDE 0.9 % (FLUSH) 0.9 %
1-10 SYRINGE (ML) INJECTION AS NEEDED
Status: DISCONTINUED | OUTPATIENT
Start: 2023-02-21 | End: 2023-02-22 | Stop reason: HOSPADM

## 2023-02-21 RX ORDER — MIDAZOLAM HYDROCHLORIDE 1 MG/ML
1 INJECTION INTRAMUSCULAR; INTRAVENOUS AS NEEDED
Status: DISCONTINUED | OUTPATIENT
Start: 2023-02-21 | End: 2023-02-22 | Stop reason: HOSPADM

## 2023-02-21 RX ORDER — LIDOCAINE HYDROCHLORIDE 10 MG/ML
1 INJECTION, SOLUTION INFILTRATION; PERINEURAL ONCE AS NEEDED
Status: DISCONTINUED | OUTPATIENT
Start: 2023-02-21 | End: 2023-02-22 | Stop reason: HOSPADM

## 2023-02-21 RX ORDER — FENTANYL CITRATE 50 UG/ML
50 INJECTION, SOLUTION INTRAMUSCULAR; INTRAVENOUS AS NEEDED
Status: DISCONTINUED | OUTPATIENT
Start: 2023-02-21 | End: 2023-02-22 | Stop reason: HOSPADM

## 2023-02-21 RX ORDER — METHYLPREDNISOLONE ACETATE 80 MG/ML
80 INJECTION, SUSPENSION INTRA-ARTICULAR; INTRALESIONAL; INTRAMUSCULAR; SOFT TISSUE ONCE
Status: DISCONTINUED | OUTPATIENT
Start: 2023-02-21 | End: 2023-02-22 | Stop reason: HOSPADM

## 2023-02-21 RX ORDER — METHYLPREDNISOLONE ACETATE 80 MG/ML
INJECTION, SUSPENSION INTRA-ARTICULAR; INTRALESIONAL; INTRAMUSCULAR; SOFT TISSUE
Status: COMPLETED
Start: 2023-02-21 | End: 2023-02-21

## 2023-02-21 RX ADMIN — IOPAMIDOL 10 ML: 408 INJECTION, SOLUTION INTRATHECAL at 10:31

## 2023-02-21 RX ADMIN — METHYLPREDNISOLONE ACETATE 80 MG: 80 INJECTION, SUSPENSION INTRA-ARTICULAR; INTRALESIONAL; INTRAMUSCULAR; SOFT TISSUE at 10:31

## 2023-02-21 NOTE — ADDENDUM NOTE
Addendum  created 02/21/23 1106 by Tj Dickson MD    Order Reconciliation Section accessed, Order list changed

## 2023-02-21 NOTE — ANESTHESIA PROCEDURE NOTES
PAIN Epidural block      Patient reassessed immediately prior to procedure    Patient location during procedure: pain clinic  Start Time: 2/21/2023 10:26 AM  Stop Time: 2/21/2023 10:49 AM  Indication:procedure for pain  Performed By  Anesthesiologist: Tj Dickson MD  Preanesthetic Checklist  Completed: patient identified, site marked, risks and benefits discussed, surgical consent, monitors and equipment checked, pre-op evaluation and timeout performed  Additional Notes  Post-Op Diagnosis Codes:     * Spinal stenosis of lumbar region with neurogenic claudication (M48.062)     * Lumbar radiculopathy (M54.16)     * Previous decompression at L2-3 and L3-4.    The patient was observed in recovery with no evidence of neurological deficits or other problems. All questions were answered. The patient was discharged with appropriate instructions.  Prep:  Pt Position:prone  Sterile Tech:cap, gloves, mask and sterile barrier  Prep:chlorhexidine gluconate and isopropyl alcohol  Monitoring:blood pressure monitoring, continuous pulse oximetry and EKG  Procedure:Sedation: no     Approach:right paramedian  Guidance: fluoroscopy  Location:lumbar  Level:4-5 (Interlaminar)  Needle Type:Tuohy  Needle Gauge:20 G  Aspiration:negative  Medications:  Preservative Free Saline:3mL  Isovue:2mL  Comments:Isovue dye spread was consistent with epidural placement.    1 cc of 1% preservative-free lidocaineDepomedrol:80 mg  Post Assessment:  Dressing:occlusive dressing applied  Pt Tolerance:patient tolerated the procedure well with no apparent complications  Complications:no

## 2023-02-21 NOTE — DISCHARGE INSTRUCTIONS
EPIDURAL STEROID INJECTION          An epidural steroid injection is a shot of steroid medicine and numbing medicine that is given into the space between the spinal cord and the bones of the back (epidural space).  The injection helps relieve pain by an irritated or swollen nerve root.    TELL YOUR HEALTH CARE PROVIDER ABOUT:  Any allergies you have  All medicines you are taking including any over the counter medicines  Any blood disorders you have  Any surgeries you have had  Any medical conditions you have  Whether you are pregnant or may be pregnant    WHAT ARE THE RISK?  Generally, this is a safe procedure. However,problems may occur, including  Headache  Bleeding  Infection  Allergic Reaction  Nerve Damage    WHAT CAN I EXPECT AFTER THE PROCEDURE?    INJECTION SITE  Remove the Band-Aid/s after 24 hours  Check your injection site every day for signs of infection.  Check for:             Redness             Bleeding (small amt is normal)             Warmth             Pus or bad odor  Some numbness may be experienced for several hours following the procedure.  Avoid using heat on the injection site for 24 hours. You may use ice intermittently if needed by placing a         towel between your skin and the ice bag and using the ice for 20 minutes 2-3 times a day.  Do not take baths, swim or use a hot tub for 24 hours.    ACTIVITY  No strenuous activity for 24 hours then return to normal activity as tolerated.  If your leg is numb, no driving until full sensation and strength has returned.    GENERAL INSTRUCTIONS:  The injection site may feel numb, use ice with caution if numbness is present and no heat for 24 hours or until numbness is gone.   If you have numbness or weakness in your arm or leg, use those areas with caution until normal sensation returns.  It is not uncommon to notice an increase in discomfort or a change in the location of discomfort for 3-4 days after the procedure.  If discomfort is noticed  at the injection site, ice may be            applied to that area for 20 min 2-3 times a day.  Take the pain medicine your physician has prescribed or over the counter pain relievers as long as you do not have any contraindications.  If you are a diabetic, monitor your blood sugar closely.  The steroids used in your procedure may increase your blood sugar level up to 36 hours after the injection.  If your blood sugar is greater than 250, call the physician that helps you monitor your blood sugar.  Keep all follow-up visits as scheduled by your health care provider. This is important.    CONTACT OUR OFFICE IF:  You have any of these signs of infection            -Redness, swelling, or warmth around your injection site.            -Fluid or blood coming from your injection site (small amt of blood is normal)            -Pus or a bad odor from your injection site            -A fever  You develop a severe headache or a stiff neck  You lose control of your bladder or bowel movements      PAIN MANAGEMENT CENTER HOURS   Monday-Friday 7:30 am. - 4:00 pm.  For any problem related to your procedure we can be reached at [phone].  If you experience an emergency with your procedure, call [phone] or go to the emergency room.

## 2023-02-21 NOTE — H&P
[facility]    History and Physical    Patient Name: Tony Leonard  :  1940  MRN:  [id number]  Date of Admission: 2023    Subjective     The patient is an 82-year-old male who has pain which originates in his lower back and radiates to his right hip.  He reports little if any improvement following his last injection.  Today he rates his pain as a 10/10.  He is here for lumbar epidural steroid injection #2    His most recent lumbar MRI dated 2022 shows multilevel degenerative disc disease causing spinal stenosis and neural foraminal compromise.  He also has an acute compression fracture involving the L3 vertebral body.    The following portions of the patients history were reviewed and updated as appropriate: current medications, allergies, past medical history, past surgical history, past family history, past social history and problem list                Objective     Past Medical History:   Past Medical History:   Diagnosis Date   • ADHD (attention deficit hyperactivity disorder)    • Bronchiectasis ([facility])    • CHF (congestive heart failure) ([facility])    • Colon polyp    • COPD (chronic obstructive pulmonary disease) ([facility])    • Diverticulosis    • Hard of hearing    • On home oxygen therapy     2 LITERS   • Pneumonia    • Prostate cancer ([facility]) 2019   • Spinal stenosis, lumbar region with neurogenic claudication 2022     Past Surgical History:   Past Surgical History:   Procedure Laterality Date   • BRONCHOSCOPY N/A 2016    Procedure: BRONCHOSCOPY with BAL of right lower lobe and left  lower lobe.  ;  Surgeon: Nando Diaz MD;  Location: [facility] ENDOSCOPY;  Service:    • BRONCHOSCOPY N/A 2017    Procedure: BRONCHOSCOPY;  Surgeon: Katharina Salter MD;  Location: [facility] ENDOSCOPY;  Service:    • BRONCHOSCOPY Bilateral 2017    Procedure: BRONCHOSCOPY WITH WASHINGS;  Surgeon: Katharina Salter MD;  Location: [facility] ENDOSCOPY;  Service:    • BRONCHOSCOPY N/A 2018     Procedure: BRONCHOSCOPY AT BEDSIDE with BAL;  Surgeon: Katharina Salter MD;  Location: [facility] ENDOSCOPY;  Service:    • BRONCHOSCOPY N/A 1/30/2018    Procedure: BRONCHOSCOPY with BAL and washing;  Surgeon: Shreyas Wild MD;  Location: [facility] ENDOSCOPY;  Service:    • BRONCHOSCOPY Bilateral 4/24/2018    Procedure: BRONCHOSCOPY WITH WASHING;  Surgeon: Katharina Salter MD;  Location: [facility] ENDOSCOPY;  Service: Pulmonary   • BRONCHOSCOPY N/A 12/28/2019    Procedure: BRONCHOSCOPY with bilateral washing;  Surgeon: Katharina Salter MD;  Location: [facility] ENDOSCOPY;  Service: Pulmonary   • BRONCHOSCOPY Bilateral 1/4/2023    Procedure: BRONCHOSCOPY with lavage;  Surgeon: Katharina Salter MD;  Location: [facility] ENDOSCOPY;  Service: Pulmonary;  Laterality: Bilateral;  mucus plugging   • CARDIAC CATHETERIZATION N/A 1/29/2023    Procedure: Left Heart Cath;  Surgeon: Johnnie Ang MD;  Location: [facility] CATH INVASIVE LOCATION;  Service: Cardiology;  Laterality: N/A;   • CARDIAC CATHETERIZATION N/A 1/29/2023    Procedure: Coronary angiography;  Surgeon: Johnnie Ang MD;  Location: [facility] CATH INVASIVE LOCATION;  Service: Cardiology;  Laterality: N/A;   • COLONOSCOPY  05/17/2013    eh, ih, tort, sig tics   • COLONOSCOPY N/A 5/10/2019    Non-thrombosed external hemorrhoids found on perianal exam, Diverticulosis, Tortuous colon, One 5 mm polyp in the mid ascending colon, IH. Path: Tubular adenoma.    • COLONOSCOPY N/A 9/24/2022    Procedure: COLONOSCOPY to cecum and TI with argon plasma coagulation.;  Surgeon: Bruce Black MD;  Location: [facility] ENDOSCOPY;  Service: Gastroenterology;  Laterality: N/A;  pre- GI bleeding  post- radiation proctitis, diverticulosis   • ENDOSCOPY  03/19/2015    z dequan king   • ENDOSCOPY N/A 9/24/2022    Procedure: ESOPHAGOGASTRODUODENOSCOPY;  Surgeon: Bruce Black MD;  Location: [facility] ENDOSCOPY;  Service: Gastroenterology;  Laterality: N/A;  pre- GI bleeding  post- esophagitis,  hiatal hernia   • HIP OPEN REDUCTION Right 1/17/2018    Procedure: HIP OPEN REDUCTION INTERNAL FIXATION WITH DYNAMIC HIP SCREW;  Surgeon: Les Black MD;  Location: [facility];  Service:    • SPINE SURGERY     • TONSILLECTOMY     • TOTAL HIP ARTHROPLASTY REVISION Right 9/23/2019    Procedure: HIP  REVISION RIGHT;  Surgeon: Isauro Warner MD;  Location: [facility] OR;  Service: Orthopedics     Family History:   Family History   Problem Relation Age of Onset   • Thyroid disease Mother    • No Known Problems Father    • Malig Hyperthermia Neg Hx      Social History:   Social History     Socioeconomic History   • Marital status: Single   Tobacco Use   • Smoking status: Never   • Smokeless tobacco: Never   Vaping Use   • Vaping Use: Never used   Substance and Sexual Activity   • Alcohol use: No     Comment: red wine occassional   • Drug use: No   • Sexual activity: Defer       Vital Signs Range for the last 24 hours  Temperature: Temp:  [36.8 °C (98.2 °F)] 36.8 °C (98.2 °F)   Temp Source: Temp src: Oral   BP: BP: (130)/(85) 130/85   Pulse: Heart Rate:  [64] 64   Respirations: Resp:  [16] 16   SPO2: SpO2:  [97 %] 97 %   O2 Amount (l/min):     O2 Devices Device (Oxygen Therapy): room air   Weight:           --------------------------------------------------------------------------------    Current Outpatient Medications   Medication Sig Dispense Refill   • apixaban (ELIQUIS) 5 MG tablet tablet Take 1 tablet by mouth Every 12 (Twelve) Hours. Indications: DVT/PE (active thrombosis) 60 tablet 1   • acetaminophen (TYLENOL) 500 MG tablet Take 500 mg by mouth Every 6 (Six) Hours As Needed for Mild Pain.     • albuterol (PROVENTIL) (2.5 MG/3ML) 0.083% nebulizer solution Take 2.5 mg by nebulization Every 6 (Six) Hours As Needed for Wheezing. 360 mL 3   • azelastine (ASTELIN) 0.1 % nasal spray 2 sprays into the nostril(s) as directed by provider 2 (Two) Times a Day. Use in each nostril as directed 30 mL 0   • budesonide  (PULMICORT) 180 MCG/ACT inhaler Inhale 2 puffs 2 (Two) Times a Day. 1 each 3   • calcium carbonate (TUMS) 500 MG chewable tablet Chew 1 tablet As Needed for Indigestion or Heartburn.     • clotrimazole-betamethasone (Lotrisone) 1-0.05 % cream Apply 1 application topically to the appropriate area as directed 2 (Two) Times a Day. Do not apply to scrotum 45 g 1   • FLUoxetine (PROzac) 20 MG capsule TAKE 1 CAPSULE DAILY 90 capsule 3   • fluticasone (FLONASE) 50 MCG/ACT nasal spray 2 sprays by Each Nare route Daily. 11.1 mL 1   • gabapentin (NEURONTIN) 300 MG capsule Take 1 capsule by mouth 3 (Three) Times a Day. 270 capsule 1   • guaifenesin (ROBITUSSIN) 100 MG/5ML liquid Take 10 mL by mouth Every 4 (Four) Hours As Needed for Cough. 118 mL 1   • methylphenidate (RITALIN) 10 MG tablet Take 1 tablet by mouth Daily. 30 tablet 0   • Multiple Vitamins-Minerals (MULTIVITAMIN ADULT PO) Take 1 tablet by mouth Daily.     • naproxen (Naprosyn) 500 MG tablet Take 1 tablet by mouth 2 (Two) Times a Day With Meals. 180 tablet 3   • pantoprazole (PROTONIX) 40 MG EC tablet Take 1 tablet by mouth Daily. 30 tablet 0   • potassium chloride (K-DUR,KLOR-CON) 20 MEQ CR tablet Take 1 tablet by mouth Daily.     • rOPINIRole (REQUIP) 0.5 MG tablet Take 1 tablet by mouth Every Night. Take 1 hour before bedtime. (Patient taking differently: Take 0.5 mg by mouth Every Night.) 90 tablet 1   • sodium chloride 7 % nebulizer solution nebulizer solution Inhale 1 vial 2 (Two) Times a Day.     • terbinafine (lamiSIL) 250 MG tablet Take 250 mg by mouth Daily.       Current Facility-Administered Medications   Medication Dose Route Frequency Provider Last Rate Last Admin   • iopamidol (ISOVUE-M 200) 41 % injection  - ADS Override Pull            • methylPREDNISolone acetate (DEPO-medrol) 80 MG/ML injection  - ADS Override Pull                --------------------------------------------------------------------------------  Assessment & Plan      Anesthesia  Evaluation     Patient summary reviewed and Nursing notes reviewed   NPO Solid Status: > 8 hours  NPO Liquid Status: > 2 hours           Airway   Mallampati: II  TM distance: >3 FB  Neck ROM: full  Dental - normal exam     Pulmonary - normal exam    breath sounds clear to auscultation  (+) pneumonia , COPD,   Cardiovascular - normal exam    Rhythm: regular  Rate: normal    (+) past MI (NSTEMI (non-ST elevated myocardial infarction)) , dysrhythmias Paroxysmal Atrial Fib, CHF Diastolic >=55%,   (-) angina, orthopnea, PND, RODRIGUEZ      Neuro/Psych  (+) psychiatric history ADHD,    GI/Hepatic/Renal/Endo    (+)  hiatal hernia, GERD, GI bleeding lower ,     Musculoskeletal     Abdominal    Substance History - negative use     OB/GYN negative ob/gyn ROS         Other   arthritis,    history of cancer (Prostate)               Diagnosis and Plan    Treatment Plan  ASA 3   Patient has had previous injection/procedure with 0% improvement.   Procedures: Lumbar Epidural Steroid Injection(LESI), With fluoroscopy,       Anesthetic plan and risks discussed with patient.          Diagnosis     * Spinal stenosis of lumbar region with neurogenic claudication [M48.062]     * Lumbar radiculopathy [M54.16]

## 2023-02-22 ENCOUNTER — PATIENT OUTREACH (OUTPATIENT)
Dept: CASE MANAGEMENT | Facility: OTHER | Age: 83
End: 2023-02-22
Payer: MEDICARE

## 2023-02-22 NOTE — OUTREACH NOTE
AMBULATORY CASE MANAGEMENT NOTE    Name and Relationship of Patient/Support Person: Horacio Tony L - Self    Adult Patient Profile  Questions/Answers    Flowsheet Row Most Recent Value   Symptoms/Conditions Managed at Home chronic pain, musculoskeletal, gastrointestinal, respiratory   Barriers to Managing Health age, understanding health advice   Cardiovascular Symptoms/Conditions myocardial infarction   Cardiovascular Management Strategies medication therapy   Cardiovascular Self-Management Outcome 4 (good)   Cardiovascular Comment denies symptoms   Gastrointestinal Symptoms/Conditions reflux/heartburn   Gastrointestinal Management Strategies medication therapy   Gastrointestinal Self-Management Outcome 4 (good)   Gastrointestinal Comment denies symptoms   Musculoskeletal Symptoms/Conditions joint pain, mobility limited   Musculoskeletal Management Strategies other (see comments), adequate rest, activity   Musculoskeletal Self-Management Outcome 3 (uncertain)   Musculoskeletal Comment states had an epidural injection yesterday and unsure if it is helping just yet   Chronic Pain Location lumbar pain in low back   Chronic Pain Frequency intermittent   Chronic Pain Quality aching   Chronic Pain Aggravating Factors activity   Chronic Pain Self-Management Outcome 3 (uncertain)   Respiratory Symptoms/Conditions COPD   Respiratory Management Strategies adequate rest   Respiratory Self-Management Outcome 4 (good)   Respiratory Comment states has improved and having no symptoms recently        SDOH updated and reviewed with the patient during this program:Patient Outreach      Physical Activity: Insufficiently Active   • Days of Exercise per Week: 2 days   • Minutes of Exercise per Session: 10 min      Social Connections: Moderately Isolated   • Frequency of Communication with Friends and Family: Three times a week   • Frequency of Social Gatherings with Friends and Family: More than three times a week   • Attends  Anabaptism Services: More than 4 times per year   • Active Member of Clubs or Organizations: No   • Attends Club or Organization Meetings: Never   • Marital Status:       Transportation Needs: No Transportation Needs   • Lack of Transportation (Medical): No   • Lack of Transportation (Non-Medical): No       Education Documentation  Signs/Symptoms, taught by Azeb Cook, RN at 2/22/2023 10:48 AM.  Learner: Patient  Readiness: Acceptance  Method: Explanation, Teach Back  Response: Verbalizes Understanding, Needs Reinforcement      Patient Outreach    Outgoing call to the patient for follow up.  He verbalized improvement with respiratory symptoms.  He prefers an outreach in one month and this has been scheduled.     Azeb PHAM  Ambulatory Case Management    2/22/2023, 10:48 EST

## 2023-02-28 RX ORDER — GABAPENTIN 300 MG/1
300 CAPSULE ORAL 3 TIMES DAILY
Qty: 270 CAPSULE | Refills: 1 | Status: SHIPPED | OUTPATIENT
Start: 2023-02-28 | End: 2023-03-07 | Stop reason: SDUPTHER

## 2023-03-07 RX ORDER — GABAPENTIN 300 MG/1
300 CAPSULE ORAL 3 TIMES DAILY
Qty: 270 CAPSULE | Refills: 1 | Status: SHIPPED | OUTPATIENT
Start: 2023-03-07 | End: 2023-03-17 | Stop reason: SDUPTHER

## 2023-03-17 RX ORDER — GABAPENTIN 300 MG/1
300 CAPSULE ORAL 3 TIMES DAILY
Qty: 270 CAPSULE | Refills: 1 | Status: SHIPPED | OUTPATIENT
Start: 2023-03-17

## 2023-03-22 ENCOUNTER — PATIENT OUTREACH (OUTPATIENT)
Dept: CASE MANAGEMENT | Facility: OTHER | Age: 83
End: 2023-03-22
Payer: MEDICARE

## 2023-03-22 NOTE — OUTREACH NOTE
AMBULATORY CASE MANAGEMENT NOTE    Name and Relationship of Patient/Support Person: Tony Leonard L - Self    Adult Patient Profile  Questions/Answers    Flowsheet Row Most Recent Value   Symptoms/Conditions Managed at Home respiratory, cardiovascular, musculoskeletal   Barriers to Managing Health age, understanding health advice   Cardiovascular Symptoms/Conditions myocardial infarction   Cardiovascular Management Strategies routine screening, medication therapy   Cardiovascular Self-Management Outcome 4 (good)   Cardiovascular Comment denies symptoms and has follow up appointments in April with cardiology   Musculoskeletal Symptoms/Conditions back pain, unsteady gait   Musculoskeletal Management Strategies activity, other (see comments), coping strategies   Musculoskeletal Self-Management Outcome 4 (good)   Musculoskeletal Comment reports hip pain much improved with injections but chronic back pain remains.  He utilizes heat application and wears a back brace for comfort   Chronic Pain Goal/Acceptable Level 5   Chronic Pain Baseline with Activity 5 or more   Chronic Pain Location lower back pain   Chronic Pain Frequency intermittent, frequent   Chronic Pain Quality aching   Chronic Pain Associated Signs/Symptoms anxiety   Chronic Pain Aggravating Factors activity   Chronic Pain Management Strategies heat application, medical device  [back brace]   Chronic Pain Self-Management Outcome 4 (good)   Respiratory Symptoms/Conditions COPD, asthma   Respiratory Management Strategies adequate rest, activity, oxygen therapy, medication therapy   Oxygen Therapy Device nasal cannula   Oxygen Therapy Times night time only, as needed   Oxygen Flow (L/min) 2-3 L NC   Respiratory Self-Management Outcome 4 (good)   Respiratory Comment reports he has kept his appointments with pulmonary and following MD instructions, their office is working with him to obtain newer medication and coverage   Identifying Health Goals be more active    Barriers to Taking Medication as Prescribed none        Patient Outreach    Outgoing call to the patient for follow up.  Introduced self and explained role and purpose of outreach.  Verified he has ACM RN contact and encouraged he outreach with any needs.  He states she has been going to [employer] daily.  He is preparing his own food.  He plans to keep cardiology appointments and reviewed upcoming appointment scheduled.  He denies needs at this time.  He states he has been less short of air with activity and his hip pain has improved.  He is wearing his back brace and uses heat for chronic lower back pain.  He states he has no issues with his medications and is taking them as prescribed.  He states that the pulmonary office is working on coverage for a new medication and he has a phone number he will call to follow up.  He denies needs today and prefers a follow up in about one month that has been scheduled.     Azeb PHAM  Ambulatory Case Management    3/22/2023, 13:56 EDT

## 2023-04-03 ENCOUNTER — OFFICE VISIT (OUTPATIENT)
Dept: ORTHOPEDIC SURGERY | Facility: CLINIC | Age: 83
End: 2023-04-03
Payer: MEDICARE

## 2023-04-03 ENCOUNTER — TELEPHONE (OUTPATIENT)
Dept: INTERNAL MEDICINE | Facility: CLINIC | Age: 83
End: 2023-04-03
Payer: MEDICARE

## 2023-04-03 VITALS — HEIGHT: 71 IN | TEMPERATURE: 98.2 F | BODY MASS INDEX: 25.9 KG/M2 | WEIGHT: 185 LBS

## 2023-04-03 DIAGNOSIS — M19.90 OSTEOARTHRITIS, CHRONIC: ICD-10-CM

## 2023-04-03 DIAGNOSIS — M54.40 CHRONIC LOW BACK PAIN WITH SCIATICA, SCIATICA LATERALITY UNSPECIFIED, UNSPECIFIED BACK PAIN LATERALITY: Primary | ICD-10-CM

## 2023-04-03 DIAGNOSIS — G89.29 CHRONIC LOW BACK PAIN WITH SCIATICA, SCIATICA LATERALITY UNSPECIFIED, UNSPECIFIED BACK PAIN LATERALITY: Primary | ICD-10-CM

## 2023-04-03 DIAGNOSIS — M48.062 SPINAL STENOSIS, LUMBAR REGION WITH NEUROGENIC CLAUDICATION: ICD-10-CM

## 2023-04-03 PROCEDURE — 1160F RVW MEDS BY RX/DR IN RCRD: CPT | Performed by: NURSE PRACTITIONER

## 2023-04-03 PROCEDURE — 1159F MED LIST DOCD IN RCRD: CPT | Performed by: NURSE PRACTITIONER

## 2023-04-03 PROCEDURE — 99213 OFFICE O/P EST LOW 20 MIN: CPT | Performed by: NURSE PRACTITIONER

## 2023-04-03 NOTE — PROGRESS NOTES
Patient Name: Tony Leonard   YOB: 1940  Referring Primary Care Physician: Erich Aviles MD      Chief Complaint:    Chief Complaint   Patient presents with   • Lumbar Spine - Follow-up, Pain        HPI:  Tony Leonard is a 82 y.o. male who presents to [facility] for follow-up of back pain.  Since last visit he had epidural injection at the hospital pain management.  He says he had about 30 days worth of relief.  He had previously been through pain management with Dr. Bhatt for medication management, however did not receive injections with her.  He says the pain management at the hospital is closer to him.  Since last visit his PCP retired and he states today he is primarily here for referral to a rheumatologist.  Back pain is overall same since last visit referring across the [place].  He also still gets pain referring into the right greater than left lower extremities at times.  Prior history of lumbar decompression with Dr. Chan in 2020.  Has seen Dr. Gordon intermittently since then.    PFSH:  See attached    ROS: As per HPI, otherwise negative    Objective:    Vitals:    04/03/23 0948   Temp: 98.2 °F (36.8 °C)     Body mass index is 25.8 kg/m².      Neurologic Exam     Mental Status   Oriented to person, place, and time.   Speech: speech is normal     Motor Exam   Muscle bulk: normal  Overall muscle tone: normal     Ortho Exam  Physical Exam  Neurological:      Mental Status: He is oriented to person, place, and time.   Psychiatric:         Speech: Speech normal.     Stooped gait, ambulates with a walker.      IMAGING:       No imaging in office today    Assessment:           Diagnoses and all orders for this visit:    1. Chronic low back pain with sciatica, sciatica laterality unspecified, unspecified back pain laterality (Primary)  -     Cancel: Ambulatory Referral to Physical Therapy Evaluate and treat  -     Ambulatory Referral to Physical Therapy  "Evaluate and treat    2. Spinal stenosis, lumbar region with neurogenic claudication  -     Cancel: Ambulatory Referral to Physical Therapy Evaluate and treat  -     Ambulatory Referral to Physical Therapy Evaluate and treat    3. Osteoarthritis, chronic  -     Ambulatory Referral to Rheumatology             Plan:  For the chronic low back pain, he does intend on continuing with injection therapy.  He asks about \"kinesiology\" today.  Try to pick through whether he meant therapy treatment for ketamine and he says it is just something he has heard about on the television.  In his description, it sounds more of a therapy treatment so we will refer him to physical therapy which we have discussed in the past.  Therapy can help work on his gait and pain symptoms.  We will make the referral to rheumatology, but he understands that rheumatology generally requires a PCP referral.  He is in a situation that he does not currently have a PCP since he retired just last week.  He is encouraged to get into the PCP and he states he will call today as he has 1 in mind.  He asked about something for pain today, Dr. Bhatt previously prescribed for him.  He understands that I do not manage long-term narcotics.  We discussed referral to a more comprehensive pain management office such as [facility], but he prefers to stay at the hospital for his injections.  For now we will just plan on follow-up as needed since he is not interested in any further surgical intervention with which I agree.    No follow-ups on file.  "

## 2023-04-03 NOTE — TELEPHONE ENCOUNTER
Lets plan not to except this patient.  Send to the [facility] referring center.

## 2023-04-14 ENCOUNTER — OFFICE VISIT (OUTPATIENT)
Dept: CARDIOLOGY | Facility: CLINIC | Age: 83
End: 2023-04-14
Payer: MEDICARE

## 2023-04-14 VITALS
DIASTOLIC BLOOD PRESSURE: 57 MMHG | WEIGHT: 186 LBS | SYSTOLIC BLOOD PRESSURE: 101 MMHG | BODY MASS INDEX: 26.04 KG/M2 | HEIGHT: 71 IN | HEART RATE: 89 BPM

## 2023-04-14 DIAGNOSIS — I50.32 CHRONIC DIASTOLIC CONGESTIVE HEART FAILURE: ICD-10-CM

## 2023-04-14 DIAGNOSIS — I71.40 ABDOMINAL AORTIC ANEURYSM (AAA) WITHOUT RUPTURE, UNSPECIFIED PART: ICD-10-CM

## 2023-04-14 DIAGNOSIS — E66.2 OBESITY WITH ALVEOLAR HYPOVENTILATION AND SERIOUS COMORBIDITY, UNSPECIFIED CLASSIFICATION: ICD-10-CM

## 2023-04-14 DIAGNOSIS — I10 PRIMARY HYPERTENSION: Primary | ICD-10-CM

## 2023-04-14 DIAGNOSIS — Z09 HOSPITAL DISCHARGE FOLLOW-UP: ICD-10-CM

## 2023-04-14 DIAGNOSIS — I26.92 SADDLE EMBOLUS OF PULMONARY ARTERY, UNSPECIFIED CHRONICITY, UNSPECIFIED WHETHER ACUTE COR PULMONALE PRESENT: ICD-10-CM

## 2023-04-14 RX ORDER — ASPIRIN 81 MG/1
81 TABLET, CHEWABLE ORAL
COMMUNITY
End: 2023-04-14

## 2023-04-14 RX ORDER — ASPIRIN 81 MG/1
81 TABLET, CHEWABLE ORAL ONCE
Status: SHIPPED | OUTPATIENT
Start: 2023-04-14

## 2023-04-14 NOTE — PROGRESS NOTES
Subjective:        Tony Leonard is a 82 y.o. male who here for follow up    No chief complaint on file.    Hospital follow-up status post cardiac cath    HPI    This is a 82-year-old male who is new to this provider.  He has a history to include chronic diastolic CHF preserved EF, COPD, history of PE, thoracic aortic aneurysm  (CT in December showed ascending thoracic aorta measuring 4.2 cm ), home oxygen, history of prostate cancer, spinal stenosis, and ADHD.  He comes in today status post cardiac cath after having a non-STEMI and hospitalization.      His cardiac cath showed normal coronary arteries.  His echo revealed EF 51 to 55%, normal LV function.  Moderate dilation of the aortic root, moderate dilation of aorta.      The following portions of the patient's history were reviewed and updated as appropriate: allergies, current medications, past family history, past medical history, past social history, past surgical history and problem list.    Past Medical History:   Diagnosis Date   • ADHD (attention deficit hyperactivity disorder)    • Bronchiectasis    • CHF (congestive heart failure)    • Colon polyp    • COPD (chronic obstructive pulmonary disease)    • Diverticulosis    • Hard of hearing    • On home oxygen therapy     2 LITERS   • Pneumonia    • Prostate cancer 04/22/2019   • Spinal stenosis, lumbar region with neurogenic claudication 08/02/2022         reports that he has never smoked. He has never used smokeless tobacco. He reports that he does not drink alcohol and does not use drugs.     Family History   Problem Relation Age of Onset   • Thyroid disease Mother    • No Known Problems Father    • Malig Hyperthermia Neg Hx        ROS     Review of Systems  Constitutional: No wt loss, fever, fatigue  Gastrointestinal: No nausea, abdominal pain  Behavioral/Psych: No insomnia or anxiety  Cardiovascular: Denies chest pain, and shortness of breath      Objective:           Vitals and nursing note  reviewed.   Constitutional:       Appearance: Well-developed.   HENT:      Head: Normocephalic.      Right Ear: External ear normal.      Left Ear: External ear normal.   Neck:      Vascular: No JVD.   Pulmonary:      Effort: Pulmonary effort is normal. No respiratory distress.      Breath sounds: Normal breath sounds. No stridor. No rales.   Cardiovascular:      Normal rate. Regular rhythm.      No gallop.   Pulses:     Intact distal pulses.   Abdominal:      General: Bowel sounds are normal. There is no distension.      Palpations: Abdomen is soft.      Tenderness: There is no abdominal tenderness. There is no guarding.   Musculoskeletal: Normal range of motion.         General: No tenderness.      Cervical back: Normal range of motion. Skin:     General: Skin is warm.   Neurological:      Mental Status: Alert and oriented to person, place, and time.      Deep Tendon Reflexes: Reflexes are normal and symmetric.   Psychiatric:         Judgment: Judgment normal.         Procedures    2023  Interpretation Summary       •  Left ventricular ejection fraction appears to be 51 - 55%.  •  Left ventricular diastolic function was normal.  •  Left atrial volume is mildly increased.  •  There is calcification of the aortic valve.  •  Estimated right ventricular systolic pressure from tricuspid regurgitation is mildly elevated (35-45 mmHg).  •  Moderate dilation of the aortic root is present. Mild dilation of the sinuses of Valsalva is present. Moderate dilation of the ascending aorta is present.    2023 cardiac cath    Impression:    Normal coronary arteries  Normal LV  Recommendations:    Medical managed        I sincerely appreciate the opportunity to participate in your patient's care. Please feel free to contact me anytime if I can be of assistance in this or any other way.     Johnnie Ang MD  1/29/2023  15:52 EST    Current Outpatient Medications:   •  acetaminophen (TYLENOL) 500 MG tablet, Take 1 tablet by  mouth Every 6 (Six) Hours As Needed for Mild Pain., Disp: , Rfl:   •  albuterol (PROVENTIL) (2.5 MG/3ML) 0.083% nebulizer solution, Take 2.5 mg by nebulization Every 6 (Six) Hours As Needed for Wheezing., Disp: 360 mL, Rfl: 3  •  apixaban (ELIQUIS) 5 MG tablet tablet, Take 1 tablet by mouth Every 12 (Twelve) Hours. Indications: DVT/PE (active thrombosis), Disp: 60 tablet, Rfl: 1  •  azelastine (ASTELIN) 0.1 % nasal spray, 2 sprays into the nostril(s) as directed by provider 2 (Two) Times a Day. Use in each nostril as directed, Disp: 30 mL, Rfl: 0  •  budesonide (PULMICORT) 180 MCG/ACT inhaler, Inhale 2 puffs 2 (Two) Times a Day., Disp: 1 each, Rfl: 3  •  calcium carbonate (TUMS) 500 MG chewable tablet, Chew 1 tablet As Needed for Indigestion or Heartburn., Disp: , Rfl:   •  clotrimazole-betamethasone (Lotrisone) 1-0.05 % cream, Apply 1 application topically to the appropriate area as directed 2 (Two) Times a Day. Do not apply to scrotum, Disp: 45 g, Rfl: 1  •  FLUoxetine (PROzac) 20 MG capsule, TAKE 1 CAPSULE DAILY, Disp: 90 capsule, Rfl: 3  •  fluticasone (FLONASE) 50 MCG/ACT nasal spray, 2 sprays by Each Nare route Daily., Disp: 11.1 mL, Rfl: 1  •  gabapentin (NEURONTIN) 300 MG capsule, Take 1 capsule by mouth 3 (Three) Times a Day., Disp: 270 capsule, Rfl: 1  •  guaifenesin (ROBITUSSIN) 100 MG/5ML liquid, Take 10 mL by mouth Every 4 (Four) Hours As Needed for Cough., Disp: 118 mL, Rfl: 1  •  methylphenidate (RITALIN) 10 MG tablet, Take 1 tablet by mouth Daily., Disp: 30 tablet, Rfl: 0  •  Multiple Vitamins-Minerals (MULTIVITAMIN ADULT PO), Take 1 tablet by mouth Daily., Disp: , Rfl:   •  naproxen (Naprosyn) 500 MG tablet, Take 1 tablet by mouth 2 (Two) Times a Day With Meals., Disp: 180 tablet, Rfl: 3  •  pantoprazole (PROTONIX) 40 MG EC tablet, Take 1 tablet by mouth Daily., Disp: 30 tablet, Rfl: 0  •  potassium chloride (K-DUR,KLOR-CON) 20 MEQ CR tablet, Take 1 tablet by mouth Daily., Disp: , Rfl:   •   rOPINIRole (REQUIP) 0.5 MG tablet, Take 1 tablet by mouth Every Night. Take 1 hour before bedtime. (Patient taking differently: Take 1 tablet by mouth Every Night.), Disp: 90 tablet, Rfl: 1  •  sodium chloride 7 % nebulizer solution nebulizer solution, Inhale 1 vial 2 (Two) Times a Day., Disp: , Rfl:   •  terbinafine (lamiSIL) 250 MG tablet, Take 1 tablet by mouth Daily., Disp: , Rfl:      Assessment:        Patient Active Problem List   Diagnosis   • Thrush, oral   • ADD (attention deficit disorder)   • Hemorrhoids   • Bronchiectasis with acute lower respiratory infection   • Diverticul disease small and large intestine, no perforati or abscess   • Benign non-nodular prostatic hyperplasia without lower urinary tract symptoms   • Gastroesophageal reflux disease   • Malaise and fatigue   • Environmental allergies   • Hemoptysis   • Dyslipidemia   • Primary osteoarthritis involving multiple joints   • Pneumonia of right lower lobe due to infectious organism   • Abnormal EKG   • COPD (chronic obstructive pulmonary disease)   • Mild malnutrition   • Acute on chronic respiratory failure with hypoxia ([facility])   • Fracture, intertrochanteric, right femur, closed, initial encounter   • Chronic diastolic CHF (congestive heart failure) ([facility])   • Hematoma of frontal scalp   • Postoperative anemia due to acute blood loss   • Urinary retention   • Hemorrhagic disorder due to circulating anticoagulants   • History of fracture of right hip   • Closed fracture of right hip with routine healing   • Chronic low back pain with sciatica   • Change in bowel function   • Rectal bleeding   • Prostate cancer ([facility])   • On home oxygen therapy   • Acute viral bronchitis   • Peripheral neuropathy   • Plantar fasciitis   • COPD exacerbation   • Bilateral lower extremity edema   • Microscopic hematuria   • Acute midline low back pain with right-sided sciatica   • Spinal stenosis, lumbar region with neurogenic claudication   • Compression deformity  of vertebra   • Rectal bleed   • Esophagitis   • Angiectasia   • Hiatal hernia   • Overweight (BMI 25.0-29.9)   • Leukocytosis   • Bilateral hip pain   • Elevated troponin level not due myocardial infarction   • Nocturnal hypoxia   • Pneumonia of right upper lobe due to infectious organism   • NSTEMI (non-ST elevated myocardial infarction)   • Chronic respiratory failure with hypoxia   • Paroxysmal atrial fibrillation               Plan:     1.  Hospital follow-up for non-STEMI status post cardiac cath: Normal coronary arteries noted on cath.  Not indicated for Plavix for his non-STEMI.    2.  Diastolic congestive heart failure with preserved EF.  No beta-blocker due to low normal blood pressures.  I will consider adding goal-directed therapy for congestive heart failure.  Euvolemic on exam today.    Please monitor and stay on a low sodium (<2000 mg/day) diet and 2000ml of fluid. Exercise 30 minutes a day for 2-3 times a week.  Please weigh yourself daily if you gains more than 2 pounds in 1 day or 5 pounds in 1 week. Check for swelling in your feet, ankles, legs, and stomach.  Monitor for signs of fatigue, shortness of breath or cough.  Call the office for recommendations.      3.  Ascending thoracic aorta measuring 4.2 cm on CT in December.  Monitor.    4.  Dyslipidemia: He states he follows his primary care physician regarding his cholesterol and labs.    Risk of the hyperlipidemia, importance of the treatment has been explained. Pros and cons of the statins has been explained. Regular blood workup as well as side effects including the liver failure, myelopathy death has been explained.    5.  Obesity: BMI 25.94.    Significant risk of obesity to CAD, HTN has been explained  Advantages of wt reduction has been explained    6. history of PE: He states he stopped taking the medication once the bottle was empty. He was on it for approximately one month. I will have him restart his Eliquis. He states he did not have  any issues with the medication when he was on it.     Educated on not taking NSAIDS.  Also educated on taking only one asa 81 mg a day, with his Eliquis. He verbalized understanding.    Pros and cons of this new medication / change medication has been explained to  the patient. Possible side effects has been explained. Associated need of the blood  Work has been explained. Need for the compliance of the medication has been explained.      No diagnosis found.    There are no diagnoses linked to this encounter.    COUNSELING: Yael Emmanuel was given to patient for the following topics: diagnostic results, risk factor reductions, impressions, risks and benefits of treatment options and importance of treatment compliance .       SMOKING COUNSELING: Denies     Discussed with dr. Sarmiento and I will restart the Eliquis. He will follow-up in 6 months, unless he needs to be seen sooner.    Sincerely,   PABLITO Soares  [facility]  04/14/23  12:29 EDT    EMR Dragon/Transcription disclaimer:   Much of this encounter note is an electronic transcription/translation of spoken language to printed text. The electronic translation of spoken language may permit erroneous, or at times, nonsensical words or phrases to be inadvertently transcribed; Although I have reviewed the note for such errors, some may still exist.

## 2023-04-17 ENCOUNTER — OFFICE VISIT (OUTPATIENT)
Dept: FAMILY MEDICINE CLINIC | Facility: CLINIC | Age: 83
End: 2023-04-17
Payer: MEDICARE

## 2023-04-17 VITALS
OXYGEN SATURATION: 94 % | TEMPERATURE: 97.1 F | HEART RATE: 87 BPM | WEIGHT: 180 LBS | DIASTOLIC BLOOD PRESSURE: 68 MMHG | SYSTOLIC BLOOD PRESSURE: 130 MMHG | HEIGHT: 71 IN | BODY MASS INDEX: 25.2 KG/M2

## 2023-04-17 DIAGNOSIS — N40.0 BENIGN NON-NODULAR PROSTATIC HYPERPLASIA WITHOUT LOWER URINARY TRACT SYMPTOMS: ICD-10-CM

## 2023-04-17 DIAGNOSIS — I48.0 PAROXYSMAL ATRIAL FIBRILLATION: ICD-10-CM

## 2023-04-17 DIAGNOSIS — J96.11 CHRONIC RESPIRATORY FAILURE WITH HYPOXIA: ICD-10-CM

## 2023-04-17 DIAGNOSIS — I50.32 CHRONIC DIASTOLIC CHF (CONGESTIVE HEART FAILURE): ICD-10-CM

## 2023-04-17 DIAGNOSIS — E78.5 DYSLIPIDEMIA: ICD-10-CM

## 2023-04-17 DIAGNOSIS — F90.0 ATTENTION DEFICIT HYPERACTIVITY DISORDER (ADHD), PREDOMINANTLY INATTENTIVE TYPE: ICD-10-CM

## 2023-04-17 DIAGNOSIS — Z00.00 ENCOUNTER FOR MEDICAL EXAMINATION TO ESTABLISH CARE: Primary | ICD-10-CM

## 2023-04-17 DIAGNOSIS — Z99.81 ON HOME OXYGEN THERAPY: ICD-10-CM

## 2023-04-17 NOTE — PROGRESS NOTES
"Chief Complaint  Establish Care and Med Refill    Subjective        Tony Leonard presents to [facility]  History of Present Illness      Known case of congestive heart failure, hyperlipidemia, PAF, Prostrate hyperplasia, ADD, chronic respiratory failure on oxygen therapy    Chief complaint  Establishing care  Needed refill on naproxen and gabapentin.  Patient has recently been started by the cardiology on blood thinner and patient is not aware that he cannot take naproxen now.      Objective   Vital Signs:  /68   Pulse 87   Temp 97.1 °F (36.2 °C)   Ht 180.3 cm (70.98\")   Wt 81.6 kg (180 lb)   SpO2 94%   BMI 25.12 kg/m²   Estimated body mass index is 25.12 kg/m² as calculated from the following:    Height as of this encounter: 180.3 cm (70.98\").    Weight as of this encounter: 81.6 kg (180 lb).             Physical Exam  Constitutional:       Appearance: Normal appearance.   HENT:      Mouth/Throat:      Mouth: Mucous membranes are moist.      Pharynx: Oropharynx is clear.   Eyes:      Extraocular Movements: Extraocular movements intact.      Conjunctiva/sclera: Conjunctivae normal.   Cardiovascular:      Rate and Rhythm: Normal rate.      Pulses: Normal pulses.   Pulmonary:      Effort: Pulmonary effort is normal. No respiratory distress.   Skin:     General: Skin is warm.   Neurological:      General: No focal deficit present.      Mental Status: He is alert and oriented to person, place, and time.   Psychiatric:         Mood and Affect: Mood normal.         Behavior: Behavior normal.         Thought Content: Thought content normal.         Judgment: Judgment normal.        Result Review :  The following data was reviewed by: Harpreet Kate MD on 04/17/2023:  CMP        1/6/2023    07:42 1/29/2023    01:33 1/29/2023    05:38 1/30/2023    04:15   CMP   Glucose 103   106   100   83     BUN 28   19   16   7     Creatinine 0.84   0.82   0.76   0.78     EGFR 86.0   86.6   " 88.6   87.9     Sodium 137   142   143   140     Potassium 3.5   4.3   4.1   4.0     Chloride 96   110   111   109     Calcium 8.8   8.5   8.6   8.9     Total Protein  5.8       Albumin  3.4       Globulin  2.4       Total Bilirubin  0.4       Alkaline Phosphatase  61       AST (SGOT)  25       ALT (SGPT)  15       Albumin/Globulin Ratio  1.4       BUN/Creatinine Ratio 33.3   23.2   21.1   9.0     Anion Gap 9.6   10.3   9.5   9.0       CBC        1/29/2023    00:34 1/29/2023    05:38 1/30/2023    04:15 1/31/2023    03:45   CBC   WBC 9.32   6.38   6.06   6.14     RBC 3.42   3.42   3.72   3.99     Hemoglobin 10.8   10.7   11.6   12.6     Hematocrit 32.2   31.9   35.0   37.9     MCV 94.2   93.3   94.1   95.0     MCH 31.6   31.3   31.2   31.6     MCHC 33.5   33.5   33.1   33.2     RDW 12.5   12.4   12.3   12.6     Platelets 229   220   203   225                            Assessment and Plan   Diagnoses and all orders for this visit:    1. Encounter for medical examination to establish care (Primary)  Comments:  Previous lab, medication, chart reviewed with the patient    2. Chronic diastolic CHF (congestive heart failure) (HCC)  -     CBC Auto Differential; Future  -     Comprehensive Metabolic Panel; Future  -     Lipid Panel With / Chol / HDL Ratio; Future  -     TSH; Future    3. Dyslipidemia  -     CBC Auto Differential; Future  -     Comprehensive Metabolic Panel; Future  -     Lipid Panel With / Chol / HDL Ratio; Future  -     TSH; Future    4. Paroxysmal atrial fibrillation  -     CBC Auto Differential; Future  -     Comprehensive Metabolic Panel; Future  -     Lipid Panel With / Chol / HDL Ratio; Future  -     TSH; Future    5. Benign non-nodular prostatic hyperplasia without lower urinary tract symptoms    6. Attention deficit hyperactivity disorder (ADHD), predominantly inattentive type  Comments:  Stop ADHD meds today, explained patient Ritalin can be related to her heart problems and is high risk medication  at this age, patient agrees    7. On home oxygen therapy    8. Chronic respiratory failure with hypoxia  -     CBC Auto Differential; Future  -     Comprehensive Metabolic Panel; Future  -     Lipid Panel With / Chol / HDL Ratio; Future  -     TSH; Future    Advised patient to continue other medication as prescribed except ADHD meds and NSAIDs as he has been restarted on Eliquis 5 mg p.o. twice daily and he should avoid any NSAIDs while taking the blood thinner.  Patient agrees and showed verbalized understanding  RTC in 6 months for wellness checkup along with the labs         Follow Up   No follow-ups on file.  Patient was given instructions and counseling regarding his condition or for health maintenance advice. Please see specific information pulled into the AVS if appropriate.

## 2023-04-24 ENCOUNTER — PATIENT OUTREACH (OUTPATIENT)
Dept: CASE MANAGEMENT | Facility: OTHER | Age: 83
End: 2023-04-24
Payer: MEDICARE

## 2023-04-24 NOTE — OUTREACH NOTE
AMBULATORY CASE MANAGEMENT NOTE    Name and Relationship of Patient/Support Person: Tony Leonard L - Self    Adult Patient Profile  Questions/Answers    Flowsheet Row Most Recent Value   Chronic Pain Location low back pain and hip pain   Chronic Pain Frequency intermittent, constant   Chronic Pain Quality aching   Chronic Pain Aggravating Factors inactivity, activity   Chronic Pain Management Strategies exercise, adequate rest, other (see comments)  [pain management]   Chronic Pain Self-Management Outcome 3 (uncertain)   Respiratory Symptoms/Conditions asthma   Respiratory Management Strategies adequate rest, activity, oxygen therapy, medication therapy   Oxygen Therapy Device nasal cannula   Oxygen Therapy Times night time only   Oxygen Flow (L/min) 2L NC   Respiratory Self-Management Outcome 4 (good)   Respiratory Comment reports improved symptoms   Barriers to Taking Medication as Prescribed none        Patient Outreach    Outgoing call to the patient for follow up.  Mr Leonard was out at the mall today to walk indoors and praise provided.  He reports that his respiratory symptoms have improved.  He has been getting meals delivered but not sure the agency providing them, possibly [employer] Whe[employer].  He denies needs today.  He prefers a follow up in about 4 weeks and this outreach has been scheduled.   Azeb PHAM  Ambulatory Case Management    4/24/2023, 15:33 EDT

## 2023-05-02 ENCOUNTER — OFFICE VISIT (OUTPATIENT)
Dept: ORTHOPEDIC SURGERY | Facility: CLINIC | Age: 83
End: 2023-05-02
Payer: MEDICARE

## 2023-05-02 VITALS — BODY MASS INDEX: 24.3 KG/M2 | HEIGHT: 71 IN | TEMPERATURE: 97.5 F | WEIGHT: 173.6 LBS

## 2023-05-02 DIAGNOSIS — M48.061 SPINAL STENOSIS OF LUMBAR REGION, UNSPECIFIED WHETHER NEUROGENIC CLAUDICATION PRESENT: ICD-10-CM

## 2023-05-02 DIAGNOSIS — G89.29 CHRONIC LOW BACK PAIN WITH SCIATICA, SCIATICA LATERALITY UNSPECIFIED, UNSPECIFIED BACK PAIN LATERALITY: Primary | ICD-10-CM

## 2023-05-02 DIAGNOSIS — M25.552 BILATERAL HIP PAIN: ICD-10-CM

## 2023-05-02 DIAGNOSIS — M25.551 BILATERAL HIP PAIN: ICD-10-CM

## 2023-05-02 DIAGNOSIS — M54.40 CHRONIC LOW BACK PAIN WITH SCIATICA, SCIATICA LATERALITY UNSPECIFIED, UNSPECIFIED BACK PAIN LATERALITY: Primary | ICD-10-CM

## 2023-05-02 RX ORDER — GABAPENTIN 300 MG/1
300 CAPSULE ORAL 3 TIMES DAILY
Qty: 270 CAPSULE | Refills: 1 | Status: SHIPPED | OUTPATIENT
Start: 2023-05-02

## 2023-05-02 NOTE — PROGRESS NOTES
Patient Name: Tony Leonard   YOB: 1940  Referring Primary Care Physician: Harpreet Kate MD      Chief Complaint:    Chief Complaint   Patient presents with   • Lumbar Spine - Follow-up, Pain        HPI:  Tony Leonard is a 82 y.o. male who presents to [facility] for follow-up of low back pain.  Today pain is primarily on the lower lumbar spine referring in a bandlike fashion into bilateral hips.  He also gets pain referring down the right lateral leg to the ankle.  At last visit we discussed repeating injection therapy.  He has had epidural at the hospital.  He does get varying results with those.  He also had a PRP hip injection at [facility] without relief.  He is here today as he wants to establish care in a comprehensive pain management center.  He has been evaluated by Dr. Gordon in the past and found to not be a good surgical candidate.  Has also had lumbar decompression in 2020 with Dr. Chan.  He did not improve after surgery so establish care with Dr. Gordon.    PFSH:  See attached    ROS: As per HPI, otherwise negative    Objective:      82 y.o. male  Body mass index is 24.22 kg/m²., 78.7 kg (173 lb 9.6 oz), @HT@  Vitals:    05/02/23 1415   Temp: 97.5 °F (36.4 °C)         Neurologic Exam     Gait, Coordination, and Reflexes     Reflexes   Right achilles: hyporeflexic  Left achilles: hyporeflexic     Ortho Exam    Spine Musculoskeletal Exam    Inspection    Kyphosis: thoracolumbar kyphosis    Palpation    Thoracolumbar    Tenderness: present      Paraspinous: right and left      SI Joint: right and left    Reflexes    Right      Quadriceps: hyporeflexic      Achilles: hyporeflexic     Left      Quadriceps: hyporeflexic      Achilles: hyporeflexic    Special Tests    Thoracolumbar      Right      SLR: back pain      Left      SLR: back pain    General      Constitutional: well-developed and well-nourished    Scleral icterus: no    Labored breathing:  no    Psychiatric: normal mood and affect and no acute distress    Neurological: alert and oriented x3    Skin: intact        IMAGING:     No imaging in office today.    Assessment:           Diagnoses and all orders for this visit:    1. Chronic low back pain with sciatica, sciatica laterality unspecified, unspecified back pain laterality (Primary)  -     Ambulatory Referral to Pain Management Clinic    2. Bilateral hip pain  -     Ambulatory Referral to Pain Management Clinic    3. Spinal stenosis of lumbar region, unspecified whether neurogenic claudication present  -     Ambulatory Referral to Pain Management Clinic             Plan:  His primary focus today is getting established in a comprehensive pain management center.  Dr. Aviles has recently retired and he was prescribing Naprosyn and gabapentin.  Patient asks for refills of both today.  Did refill the gabapentin 300 mg 3 times daily as previously prescribed.  Refused the Naprosyn as he does take anticoagulation and in reviewing cardiology note recently, they did advise him against taking any NSAIDs.  Patient was informed of this today.  Will refer to, pain management per patient's request to continue epidural injections and may be a good candidate for SI joint injections as well as LMBB/RFA.  Encourage patient to continue HEP from therapy recently.  He does state that he continues his 30-minute walking program daily.  We will not schedule future follow-up since he has no surgical interest, but advised that if he does, he may want to follow back up with Dr. Chan.      No follow-ups on file.    EMR Dragon/Transcription Disclaimer:   Much of this encounter note is an electronic transcription/translation of spoken language to printed text. The electronic translation of spoken language may permit erroneous, or at times, nonsensical words or phrases to be inadvertently transcribed; Although I have reviewed the note for such errors, some may still exist.

## 2023-05-10 ENCOUNTER — APPOINTMENT (OUTPATIENT)
Dept: GENERAL RADIOLOGY | Facility: HOSPITAL | Age: 83
End: 2023-05-10
Payer: MEDICARE

## 2023-05-10 ENCOUNTER — HOSPITAL ENCOUNTER (OUTPATIENT)
Facility: HOSPITAL | Age: 83
Setting detail: OBSERVATION
Discharge: HOME HEALTH | End: 2023-05-11
Attending: EMERGENCY MEDICINE | Admitting: INTERNAL MEDICINE
Payer: MEDICARE

## 2023-05-10 DIAGNOSIS — I26.92 SADDLE EMBOLUS OF PULMONARY ARTERY, UNSPECIFIED CHRONICITY, UNSPECIFIED WHETHER ACUTE COR PULMONALE PRESENT: ICD-10-CM

## 2023-05-10 DIAGNOSIS — E66.2 OBESITY WITH ALVEOLAR HYPOVENTILATION AND SERIOUS COMORBIDITY, UNSPECIFIED CLASSIFICATION: ICD-10-CM

## 2023-05-10 DIAGNOSIS — I50.32 CHRONIC DIASTOLIC CONGESTIVE HEART FAILURE: ICD-10-CM

## 2023-05-10 DIAGNOSIS — I10 PRIMARY HYPERTENSION: ICD-10-CM

## 2023-05-10 DIAGNOSIS — Z09 HOSPITAL DISCHARGE FOLLOW-UP: ICD-10-CM

## 2023-05-10 DIAGNOSIS — E87.79 OTHER HYPERVOLEMIA: ICD-10-CM

## 2023-05-10 DIAGNOSIS — R09.02 HYPOXIA: ICD-10-CM

## 2023-05-10 DIAGNOSIS — I71.40 ABDOMINAL AORTIC ANEURYSM (AAA) WITHOUT RUPTURE, UNSPECIFIED PART: ICD-10-CM

## 2023-05-10 DIAGNOSIS — J44.1 ACUTE EXACERBATION OF CHRONIC OBSTRUCTIVE PULMONARY DISEASE (COPD): Primary | ICD-10-CM

## 2023-05-10 PROBLEM — D64.9 ANEMIA: Status: ACTIVE | Noted: 2023-05-10

## 2023-05-10 PROBLEM — Z79.01 CHRONIC ANTICOAGULATION: Status: ACTIVE | Noted: 2023-05-10

## 2023-05-10 LAB
ALBUMIN SERPL-MCNC: 3.8 G/DL (ref 3.5–5.2)
ALBUMIN/GLOB SERPL: 1.7 G/DL
ALP SERPL-CCNC: 63 U/L (ref 39–117)
ALT SERPL W P-5'-P-CCNC: 11 U/L (ref 1–41)
ANION GAP SERPL CALCULATED.3IONS-SCNC: 8.2 MMOL/L (ref 5–15)
AST SERPL-CCNC: 18 U/L (ref 1–40)
B PARAPERT DNA SPEC QL NAA+PROBE: NOT DETECTED
B PERT DNA SPEC QL NAA+PROBE: NOT DETECTED
BASOPHILS # BLD AUTO: 0.01 10*3/MM3 (ref 0–0.2)
BASOPHILS NFR BLD AUTO: 0.2 % (ref 0–1.5)
BILIRUB SERPL-MCNC: 0.3 MG/DL (ref 0–1.2)
BUN SERPL-MCNC: 21 MG/DL (ref 8–23)
BUN/CREAT SERPL: 20.6 (ref 7–25)
C PNEUM DNA NPH QL NAA+NON-PROBE: NOT DETECTED
CALCIUM SPEC-SCNC: 9.3 MG/DL (ref 8.6–10.5)
CHLORIDE SERPL-SCNC: 108 MMOL/L (ref 98–107)
CO2 SERPL-SCNC: 25.8 MMOL/L (ref 22–29)
CREAT SERPL-MCNC: 1.02 MG/DL (ref 0.76–1.27)
DEPRECATED RDW RBC AUTO: 43.1 FL (ref 37–54)
EGFRCR SERPLBLD CKD-EPI 2021: 72.5 ML/MIN/1.73
EOSINOPHIL # BLD AUTO: 0 10*3/MM3 (ref 0–0.4)
EOSINOPHIL NFR BLD AUTO: 0 % (ref 0.3–6.2)
ERYTHROCYTE [DISTWIDTH] IN BLOOD BY AUTOMATED COUNT: 13.1 % (ref 12.3–15.4)
FLUAV SUBTYP SPEC NAA+PROBE: NOT DETECTED
FLUBV RNA ISLT QL NAA+PROBE: NOT DETECTED
GLOBULIN UR ELPH-MCNC: 2.2 GM/DL
GLUCOSE SERPL-MCNC: 126 MG/DL (ref 65–99)
HADV DNA SPEC NAA+PROBE: NOT DETECTED
HCOV 229E RNA SPEC QL NAA+PROBE: NOT DETECTED
HCOV HKU1 RNA SPEC QL NAA+PROBE: NOT DETECTED
HCOV NL63 RNA SPEC QL NAA+PROBE: NOT DETECTED
HCOV OC43 RNA SPEC QL NAA+PROBE: NOT DETECTED
HCT VFR BLD AUTO: 32.7 % (ref 37.5–51)
HGB BLD-MCNC: 10.5 G/DL (ref 13–17.7)
HMPV RNA NPH QL NAA+NON-PROBE: NOT DETECTED
HOLD SPECIMEN: NORMAL
HOLD SPECIMEN: NORMAL
HPIV1 RNA ISLT QL NAA+PROBE: NOT DETECTED
HPIV2 RNA SPEC QL NAA+PROBE: NOT DETECTED
HPIV3 RNA NPH QL NAA+PROBE: NOT DETECTED
HPIV4 P GENE NPH QL NAA+PROBE: NOT DETECTED
IMM GRANULOCYTES # BLD AUTO: 0.01 10*3/MM3 (ref 0–0.05)
IMM GRANULOCYTES NFR BLD AUTO: 0.2 % (ref 0–0.5)
LYMPHOCYTES # BLD AUTO: 2.05 10*3/MM3 (ref 0.7–3.1)
LYMPHOCYTES NFR BLD AUTO: 31.7 % (ref 19.6–45.3)
M PNEUMO IGG SER IA-ACNC: NOT DETECTED
MCH RBC QN AUTO: 28.7 PG (ref 26.6–33)
MCHC RBC AUTO-ENTMCNC: 32.1 G/DL (ref 31.5–35.7)
MCV RBC AUTO: 89.3 FL (ref 79–97)
MONOCYTES # BLD AUTO: 0.73 10*3/MM3 (ref 0.1–0.9)
MONOCYTES NFR BLD AUTO: 11.3 % (ref 5–12)
NEUTROPHILS NFR BLD AUTO: 3.66 10*3/MM3 (ref 1.7–7)
NEUTROPHILS NFR BLD AUTO: 56.6 % (ref 42.7–76)
NRBC BLD AUTO-RTO: 0 /100 WBC (ref 0–0.2)
NT-PROBNP SERPL-MCNC: 239 PG/ML (ref 0–1800)
PLATELET # BLD AUTO: 227 10*3/MM3 (ref 140–450)
PMV BLD AUTO: 8.9 FL (ref 6–12)
POTASSIUM SERPL-SCNC: 4.1 MMOL/L (ref 3.5–5.2)
PROT SERPL-MCNC: 6 G/DL (ref 6–8.5)
QT INTERVAL: 403 MS
RBC # BLD AUTO: 3.66 10*6/MM3 (ref 4.14–5.8)
RHINOVIRUS RNA SPEC NAA+PROBE: NOT DETECTED
RSV RNA NPH QL NAA+NON-PROBE: NOT DETECTED
SARS-COV-2 RNA NPH QL NAA+NON-PROBE: NOT DETECTED
SODIUM SERPL-SCNC: 142 MMOL/L (ref 136–145)
TROPONIN T SERPL HS-MCNC: 26 NG/L
TROPONIN T SERPL HS-MCNC: 32 NG/L
WBC NRBC COR # BLD: 6.46 10*3/MM3 (ref 3.4–10.8)
WHOLE BLOOD HOLD COAG: NORMAL
WHOLE BLOOD HOLD SPECIMEN: NORMAL

## 2023-05-10 PROCEDURE — 85025 COMPLETE CBC W/AUTO DIFF WBC: CPT

## 2023-05-10 PROCEDURE — 83880 ASSAY OF NATRIURETIC PEPTIDE: CPT

## 2023-05-10 PROCEDURE — G0378 HOSPITAL OBSERVATION PER HR: HCPCS

## 2023-05-10 PROCEDURE — 84484 ASSAY OF TROPONIN QUANT: CPT

## 2023-05-10 PROCEDURE — 96375 TX/PRO/DX INJ NEW DRUG ADDON: CPT

## 2023-05-10 PROCEDURE — 96374 THER/PROPH/DIAG INJ IV PUSH: CPT

## 2023-05-10 PROCEDURE — 94640 AIRWAY INHALATION TREATMENT: CPT

## 2023-05-10 PROCEDURE — 84484 ASSAY OF TROPONIN QUANT: CPT | Performed by: PHYSICIAN ASSISTANT

## 2023-05-10 PROCEDURE — 25010000002 METHYLPREDNISOLONE PER 125 MG: Performed by: PHYSICIAN ASSISTANT

## 2023-05-10 PROCEDURE — 94799 UNLISTED PULMONARY SVC/PX: CPT

## 2023-05-10 PROCEDURE — 71045 X-RAY EXAM CHEST 1 VIEW: CPT

## 2023-05-10 PROCEDURE — 94664 DEMO&/EVAL PT USE INHALER: CPT

## 2023-05-10 PROCEDURE — 99285 EMERGENCY DEPT VISIT HI MDM: CPT

## 2023-05-10 PROCEDURE — 25010000002 FUROSEMIDE PER 20 MG: Performed by: PHYSICIAN ASSISTANT

## 2023-05-10 PROCEDURE — 25010000002 METHYLPREDNISOLONE PER 125 MG: Performed by: NURSE PRACTITIONER

## 2023-05-10 PROCEDURE — 96376 TX/PRO/DX INJ SAME DRUG ADON: CPT

## 2023-05-10 PROCEDURE — 94761 N-INVAS EAR/PLS OXIMETRY MLT: CPT

## 2023-05-10 PROCEDURE — 0202U NFCT DS 22 TRGT SARS-COV-2: CPT | Performed by: PHYSICIAN ASSISTANT

## 2023-05-10 PROCEDURE — 80053 COMPREHEN METABOLIC PANEL: CPT

## 2023-05-10 PROCEDURE — 93005 ELECTROCARDIOGRAM TRACING: CPT

## 2023-05-10 RX ORDER — BUDESONIDE 0.5 MG/2ML
0.5 INHALANT ORAL
Status: DISCONTINUED | OUTPATIENT
Start: 2023-05-10 | End: 2023-05-11 | Stop reason: HOSPADM

## 2023-05-10 RX ORDER — GABAPENTIN 300 MG/1
300 CAPSULE ORAL 3 TIMES DAILY
Status: DISCONTINUED | OUTPATIENT
Start: 2023-05-10 | End: 2023-05-11 | Stop reason: HOSPADM

## 2023-05-10 RX ORDER — ONDANSETRON 4 MG/1
4 TABLET, FILM COATED ORAL EVERY 6 HOURS PRN
Status: DISCONTINUED | OUTPATIENT
Start: 2023-05-10 | End: 2023-05-11 | Stop reason: HOSPADM

## 2023-05-10 RX ORDER — SODIUM CHLORIDE 9 MG/ML
40 INJECTION, SOLUTION INTRAVENOUS AS NEEDED
Status: DISCONTINUED | OUTPATIENT
Start: 2023-05-10 | End: 2023-05-11 | Stop reason: HOSPADM

## 2023-05-10 RX ORDER — AMOXICILLIN 250 MG
2 CAPSULE ORAL 2 TIMES DAILY
Status: DISCONTINUED | OUTPATIENT
Start: 2023-05-10 | End: 2023-05-11 | Stop reason: HOSPADM

## 2023-05-10 RX ORDER — ONDANSETRON 2 MG/ML
4 INJECTION INTRAMUSCULAR; INTRAVENOUS EVERY 6 HOURS PRN
Status: DISCONTINUED | OUTPATIENT
Start: 2023-05-10 | End: 2023-05-11 | Stop reason: HOSPADM

## 2023-05-10 RX ORDER — PANTOPRAZOLE SODIUM 40 MG/1
40 TABLET, DELAYED RELEASE ORAL DAILY
Status: DISCONTINUED | OUTPATIENT
Start: 2023-05-10 | End: 2023-05-11 | Stop reason: HOSPADM

## 2023-05-10 RX ORDER — FLUOXETINE HYDROCHLORIDE 20 MG/1
20 CAPSULE ORAL DAILY
Status: DISCONTINUED | OUTPATIENT
Start: 2023-05-10 | End: 2023-05-11 | Stop reason: HOSPADM

## 2023-05-10 RX ORDER — SODIUM CHLORIDE 0.9 % (FLUSH) 0.9 %
10 SYRINGE (ML) INJECTION EVERY 12 HOURS SCHEDULED
Status: DISCONTINUED | OUTPATIENT
Start: 2023-05-10 | End: 2023-05-11 | Stop reason: HOSPADM

## 2023-05-10 RX ORDER — CLOTRIMAZOLE AND BETAMETHASONE DIPROPIONATE 10; .64 MG/G; MG/G
1 CREAM TOPICAL 2 TIMES DAILY
Status: DISCONTINUED | OUTPATIENT
Start: 2023-05-10 | End: 2023-05-11 | Stop reason: HOSPADM

## 2023-05-10 RX ORDER — ACETAMINOPHEN 650 MG/1
650 SUPPOSITORY RECTAL EVERY 4 HOURS PRN
Status: DISCONTINUED | OUTPATIENT
Start: 2023-05-10 | End: 2023-05-11 | Stop reason: HOSPADM

## 2023-05-10 RX ORDER — NITROGLYCERIN 0.4 MG/1
0.4 TABLET SUBLINGUAL
Status: DISCONTINUED | OUTPATIENT
Start: 2023-05-10 | End: 2023-05-11 | Stop reason: HOSPADM

## 2023-05-10 RX ORDER — POLYETHYLENE GLYCOL 3350 17 G/17G
17 POWDER, FOR SOLUTION ORAL DAILY PRN
Status: DISCONTINUED | OUTPATIENT
Start: 2023-05-10 | End: 2023-05-11 | Stop reason: HOSPADM

## 2023-05-10 RX ORDER — FLUTICASONE PROPIONATE 50 MCG
2 SPRAY, SUSPENSION (ML) NASAL DAILY
Status: DISCONTINUED | OUTPATIENT
Start: 2023-05-10 | End: 2023-05-11 | Stop reason: HOSPADM

## 2023-05-10 RX ORDER — FUROSEMIDE 40 MG/1
40 TABLET ORAL DAILY
COMMUNITY

## 2023-05-10 RX ORDER — BENZONATATE 200 MG/1
200 CAPSULE ORAL 3 TIMES DAILY PRN
COMMUNITY

## 2023-05-10 RX ORDER — METHYLPREDNISOLONE SODIUM SUCCINATE 125 MG/2ML
60 INJECTION, POWDER, LYOPHILIZED, FOR SOLUTION INTRAMUSCULAR; INTRAVENOUS EVERY 12 HOURS
Status: DISCONTINUED | OUTPATIENT
Start: 2023-05-10 | End: 2023-05-11 | Stop reason: HOSPADM

## 2023-05-10 RX ORDER — BISACODYL 10 MG
10 SUPPOSITORY, RECTAL RECTAL DAILY PRN
Status: DISCONTINUED | OUTPATIENT
Start: 2023-05-10 | End: 2023-05-11 | Stop reason: HOSPADM

## 2023-05-10 RX ORDER — SODIUM CHLORIDE 0.9 % (FLUSH) 0.9 %
10 SYRINGE (ML) INJECTION AS NEEDED
Status: DISCONTINUED | OUTPATIENT
Start: 2023-05-10 | End: 2023-05-11 | Stop reason: HOSPADM

## 2023-05-10 RX ORDER — CALCIUM CARBONATE 200(500)MG
1 TABLET,CHEWABLE ORAL AS NEEDED
Status: DISCONTINUED | OUTPATIENT
Start: 2023-05-10 | End: 2023-05-11 | Stop reason: HOSPADM

## 2023-05-10 RX ORDER — FUROSEMIDE 40 MG/1
40 TABLET ORAL DAILY
Status: DISCONTINUED | OUTPATIENT
Start: 2023-05-10 | End: 2023-05-11 | Stop reason: HOSPADM

## 2023-05-10 RX ORDER — IPRATROPIUM BROMIDE AND ALBUTEROL SULFATE 2.5; .5 MG/3ML; MG/3ML
3 SOLUTION RESPIRATORY (INHALATION)
Status: DISCONTINUED | OUTPATIENT
Start: 2023-05-10 | End: 2023-05-11 | Stop reason: HOSPADM

## 2023-05-10 RX ORDER — METHYLPREDNISOLONE SODIUM SUCCINATE 125 MG/2ML
125 INJECTION, POWDER, LYOPHILIZED, FOR SOLUTION INTRAMUSCULAR; INTRAVENOUS ONCE
Status: COMPLETED | OUTPATIENT
Start: 2023-05-10 | End: 2023-05-10

## 2023-05-10 RX ORDER — BISACODYL 5 MG/1
5 TABLET, DELAYED RELEASE ORAL DAILY PRN
Status: DISCONTINUED | OUTPATIENT
Start: 2023-05-10 | End: 2023-05-11 | Stop reason: HOSPADM

## 2023-05-10 RX ORDER — POTASSIUM CHLORIDE 750 MG/1
20 TABLET, FILM COATED, EXTENDED RELEASE ORAL DAILY
Status: DISCONTINUED | OUTPATIENT
Start: 2023-05-10 | End: 2023-05-11 | Stop reason: HOSPADM

## 2023-05-10 RX ORDER — ACETAMINOPHEN 160 MG/5ML
650 SOLUTION ORAL EVERY 4 HOURS PRN
Status: DISCONTINUED | OUTPATIENT
Start: 2023-05-10 | End: 2023-05-11 | Stop reason: HOSPADM

## 2023-05-10 RX ORDER — ACETAMINOPHEN 325 MG/1
650 TABLET ORAL EVERY 4 HOURS PRN
Status: DISCONTINUED | OUTPATIENT
Start: 2023-05-10 | End: 2023-05-11 | Stop reason: HOSPADM

## 2023-05-10 RX ORDER — ROPINIROLE 0.5 MG/1
0.5 TABLET, FILM COATED ORAL NIGHTLY
Status: DISCONTINUED | OUTPATIENT
Start: 2023-05-10 | End: 2023-05-11 | Stop reason: HOSPADM

## 2023-05-10 RX ORDER — FUROSEMIDE 10 MG/ML
60 INJECTION INTRAMUSCULAR; INTRAVENOUS ONCE
Status: COMPLETED | OUTPATIENT
Start: 2023-05-10 | End: 2023-05-10

## 2023-05-10 RX ADMIN — ACETAMINOPHEN 650 MG: 325 TABLET ORAL at 19:16

## 2023-05-10 RX ADMIN — METHYLPREDNISOLONE SODIUM SUCCINATE 60 MG: 125 INJECTION, POWDER, FOR SOLUTION INTRAMUSCULAR; INTRAVENOUS at 12:48

## 2023-05-10 RX ADMIN — GABAPENTIN 300 MG: 300 CAPSULE ORAL at 20:58

## 2023-05-10 RX ADMIN — FUROSEMIDE 60 MG: 20 INJECTION, SOLUTION INTRAMUSCULAR; INTRAVENOUS at 08:29

## 2023-05-10 RX ADMIN — APIXABAN 5 MG: 5 TABLET, FILM COATED ORAL at 12:48

## 2023-05-10 RX ADMIN — BUDESONIDE 0.5 MG: 0.5 INHALANT ORAL at 20:42

## 2023-05-10 RX ADMIN — GABAPENTIN 300 MG: 300 CAPSULE ORAL at 16:24

## 2023-05-10 RX ADMIN — POTASSIUM CHLORIDE 20 MEQ: 750 TABLET, EXTENDED RELEASE ORAL at 12:48

## 2023-05-10 RX ADMIN — Medication 10 ML: at 20:58

## 2023-05-10 RX ADMIN — APIXABAN 5 MG: 5 TABLET, FILM COATED ORAL at 20:58

## 2023-05-10 RX ADMIN — IPRATROPIUM BROMIDE AND ALBUTEROL SULFATE 3 ML: 2.5; .5 SOLUTION RESPIRATORY (INHALATION) at 15:42

## 2023-05-10 RX ADMIN — IPRATROPIUM BROMIDE AND ALBUTEROL SULFATE 3 ML: 2.5; .5 SOLUTION RESPIRATORY (INHALATION) at 20:41

## 2023-05-10 RX ADMIN — PANTOPRAZOLE SODIUM 40 MG: 40 TABLET, DELAYED RELEASE ORAL at 12:48

## 2023-05-10 RX ADMIN — FUROSEMIDE 40 MG: 40 TABLET ORAL at 12:49

## 2023-05-10 RX ADMIN — IPRATROPIUM BROMIDE AND ALBUTEROL SULFATE 3 ML: 2.5; .5 SOLUTION RESPIRATORY (INHALATION) at 06:53

## 2023-05-10 RX ADMIN — DOCUSATE SODIUM 50MG AND SENNOSIDES 8.6MG 2 TABLET: 8.6; 5 TABLET, FILM COATED ORAL at 12:48

## 2023-05-10 RX ADMIN — IPRATROPIUM BROMIDE AND ALBUTEROL SULFATE 3 ML: 2.5; .5 SOLUTION RESPIRATORY (INHALATION) at 11:14

## 2023-05-10 RX ADMIN — METHYLPREDNISOLONE SODIUM SUCCINATE 125 MG: 125 INJECTION, POWDER, FOR SOLUTION INTRAMUSCULAR; INTRAVENOUS at 06:33

## 2023-05-10 NOTE — ED PROVIDER NOTES
EMERGENCY DEPARTMENT ENCOUNTER    Room Number:  04/04  Date of encounter:  5/10/2023  PCP: Harpreet Kate MD  Historian: Patient  Full history not obtainable due to: None    HPI:  Chief Complaint: SOA    Context: Tony Leonard is a 82 y.o. male with a PMH significant for COPD, prostate cancer, CHF, paroxysmal A-fib anticoagulated on Eliquis who presents to the ED c/o shortness of air that has been progressing since yesterday afternoon.  The patient uses nasal cannula oxygen at home as needed and claims to have been using it constantly for the past 2 days.  He does admit to a dry nonproductive cough.  He used a nebulizer at home with albuterol which alleviated symptoms minimally.  States that he has been noncompliant with his Lasix because he has joint pain in his hips and it hurts him to get up to use the restroom at home.  He denies development of fever, chills, chest pain.  He has noticed increased welling in both of his legs.      MEDICAL RECORD REVIEW:    Upon review of the medical record it appears the patient was evaluated in the office with orthopedics on May 2 for chronic low back pain.  The patient had a normal procalcitonin on 1/30/2023 and a normal PTT on that same date.    PAST MEDICAL HISTORY    Active Ambulatory Problems     Diagnosis Date Noted   • Thrush, oral 03/11/2016   • ADD (attention deficit disorder) 03/11/2016   • Hemorrhoids 03/11/2016   • Bronchiectasis with acute lower respiratory infection 03/11/2016   • Diverticul disease small and large intestine, no perforati or abscess 03/11/2016   • Benign non-nodular prostatic hyperplasia without lower urinary tract symptoms 03/11/2016   • Gastroesophageal reflux disease 03/11/2016   • Malaise and fatigue 03/11/2016   • Environmental allergies 04/25/2016   • Hemoptysis 04/27/2016   • Dyslipidemia 05/11/2016   • Primary osteoarthritis involving multiple joints 05/11/2016   • Pneumonia of right lower lobe due to infectious organism 10/03/2016    • Abnormal EKG 10/04/2016   • COPD (chronic obstructive pulmonary disease) 10/04/2016   • Mild malnutrition 03/28/2017   • Acute on chronic respiratory failure with hypoxia (Formerly [facility]) 04/27/2017   • Fracture, intertrochanteric, right femur, closed, initial encounter 01/16/2018   • Chronic diastolic CHF (congestive heart failure) (Former[facility]) 01/16/2018   • Hematoma of frontal scalp 01/16/2018   • Postoperative anemia due to acute blood loss 01/19/2018   • Urinary retention 01/25/2018   • Hemorrhagic disorder due to circulating anticoagulants 01/29/2018   • History of fracture of right hip 02/22/2018   • Closed fracture of right hip with routine healing 02/28/2018   • Chronic low back pain with sciatica 06/21/2018   • Change in bowel function 04/18/2019   • Rectal bleeding 04/18/2019   • Prostate cancer (Former[facility]) 04/22/2019   • On home oxygen therapy 09/24/2019   • Acute viral bronchitis 12/29/2019   • Peripheral neuropathy 07/01/2021   • Plantar fasciitis 09/08/2021   • COPD exacerbation 05/07/2022   • Bilateral lower extremity edema 05/07/2022   • Microscopic hematuria 05/08/2022   • Acute midline low back pain with right-sided sciatica 08/01/2022   • Spinal stenosis, lumbar region with neurogenic claudication 08/02/2022   • Compression deformity of vertebra 08/02/2022   • Rectal bleed 09/23/2022   • Esophagitis 09/24/2022   • Angiectasia 09/27/2022   • Hiatal hernia 09/27/2022   • Overweight (BMI 25.0-29.9) 11/14/2022   • Leukocytosis 11/15/2022   • Bilateral hip pain 11/21/2022   • Elevated troponin level not due myocardial infarction 12/10/2022   • Nocturnal hypoxia 12/10/2022   • Pneumonia of right upper lobe due to infectious organism 12/29/2022   • NSTEMI (non-ST elevated myocardial infarction) 01/29/2023   • Chronic respiratory failure with hypoxia 01/29/2023   • Paroxysmal atrial fibrillation 02/17/2023     Resolved Ambulatory Problems     Diagnosis Date Noted   • Pneumonia of both lower lobes due to infectious organism  03/11/2016   • Pneumonia of right upper lobe due to infectious organism 04/22/2016   • COPD exacerbation 04/25/2016   • GERD (gastroesophageal reflux disease) 04/25/2016   • Chronic respiratory failure with hypoxia 10/04/2016   • Bacterial lobar pneumonia 12/27/2016   • Pneumonia of left lower lobe due to infectious organism 03/26/2017   • Bronchitis 06/01/2017   • COPD exacerbation 06/17/2017   • Leukocytosis 01/16/2018   • Right upper lobe pneumonia 01/20/2018   • Mucus plugging of bronchi 01/16/2018   • COPD exacerbation 04/16/2018   • Degenerative joint disease (DJD) of hip 09/23/2019   • Bronchiectasis with (acute) exacerbation 12/28/2019   • Primary hypertension 05/06/2022   • Septic shock 11/26/2022   • Acute saddle pulmonary embolism without acute cor pulmonale (HCC) - 12/11/2022 12/11/2022     Past Medical History:   Diagnosis Date   • ADHD (attention deficit hyperactivity disorder)    • Bronchiectasis    • CHF (congestive heart failure)    • Colon polyp    • Diverticulosis    • Hard of hearing    • Pneumonia          PAST SURGICAL HISTORY  Past Surgical History:   Procedure Laterality Date   • BRONCHOSCOPY N/A 4/30/2016    Procedure: BRONCHOSCOPY with BAL of right lower lobe and left  lower lobe.  ;  Surgeon: Nando Diaz MD;  Location: [facility];  Service:    • BRONCHOSCOPY N/A 4/28/2017    Procedure: BRONCHOSCOPY;  Surgeon: Katharina Salter MD;  Location: [facility] ENDOSCOPY;  Service:    • BRONCHOSCOPY Bilateral 6/29/2017    Procedure: BRONCHOSCOPY WITH WASHINGS;  Surgeon: Katharina Salter MD;  Location: [facility] ENDOSCOPY;  Service:    • BRONCHOSCOPY N/A 1/22/2018    Procedure: BRONCHOSCOPY AT BEDSIDE with BAL;  Surgeon: Katharina Salter MD;  Location: [facility] ENDOSCOPY;  Service:    • BRONCHOSCOPY N/A 1/30/2018    Procedure: BRONCHOSCOPY with BAL and washing;  Surgeon: Shreyas Wild MD;  Location: [facility] ENDOSCOPY;  Service:    • BRONCHOSCOPY Bilateral 4/24/2018    Procedure: BRONCHOSCOPY WITH  WASHING;  Surgeon: Katharina Salter MD;  Location: [facility] ENDOSCOPY;  Service: Pulmonary   • BRONCHOSCOPY N/A 12/28/2019    Procedure: BRONCHOSCOPY with bilateral washing;  Surgeon: Katharina Salter MD;  Location: [facility] ENDOSCOPY;  Service: Pulmonary   • BRONCHOSCOPY Bilateral 1/4/2023    Procedure: BRONCHOSCOPY with lavage;  Surgeon: Katharina Salter MD;  Location: [facility] ENDOSCOPY;  Service: Pulmonary;  Laterality: Bilateral;  mucus plugging   • CARDIAC CATHETERIZATION N/A 1/29/2023    Procedure: Left Heart Cath;  Surgeon: Johnnie Ang MD;  Location: [facility] CATH INVASIVE LOCATION;  Service: Cardiology;  Laterality: N/A;   • CARDIAC CATHETERIZATION N/A 1/29/2023    Procedure: Coronary angiography;  Surgeon: Johnnie Ang MD;  Location: [facility] CATH INVASIVE LOCATION;  Service: Cardiology;  Laterality: N/A;   • COLONOSCOPY  05/17/2013    eh, ih, tort, sig tics   • COLONOSCOPY N/A 5/10/2019    Non-thrombosed external hemorrhoids found on perianal exam, Diverticulosis, Tortuous colon, One 5 mm polyp in the mid ascending colon, IH. Path: Tubular adenoma.    • COLONOSCOPY N/A 9/24/2022    Procedure: COLONOSCOPY to cecum and TI with argon plasma coagulation.;  Surgeon: Bruce Black MD;  Location: [facility] ENDOSCOPY;  Service: Gastroenterology;  Laterality: N/A;  pre- GI bleeding  post- radiation proctitis, diverticulosis   • ENDOSCOPY  03/19/2015    z line irreg, hh   • ENDOSCOPY N/A 9/24/2022    Procedure: ESOPHAGOGASTRODUODENOSCOPY;  Surgeon: Bruce Black MD;  Location: [facility] ENDOSCOPY;  Service: Gastroenterology;  Laterality: N/A;  pre- GI bleeding  post- esophagitis, hiatal hernia   • HIP OPEN REDUCTION Right 1/17/2018    Procedure: HIP OPEN REDUCTION INTERNAL FIXATION WITH DYNAMIC HIP SCREW;  Surgeon: Les Black MD;  Location: [facility] MAIN OR;  Service:    • SPINE SURGERY     • TONSILLECTOMY     • TOTAL HIP ARTHROPLASTY REVISION Right 9/23/2019    Procedure: HIP  REVISION RIGHT;  Surgeon:  Isauro Warner MD;  Location: [facility] OR;  Service: Orthopedics         FAMILY HISTORY  Family History   Problem Relation Age of Onset   • Thyroid disease Mother    • No Known Problems Father    • Malig Hyperthermia Neg Hx          SOCIAL HISTORY  Social History     Socioeconomic History   • Marital status: Single   Tobacco Use   • Smoking status: Never   • Smokeless tobacco: Never   Vaping Use   • Vaping Use: Never used   Substance and Sexual Activity   • Alcohol use: No     Comment: red wine occassional   • Drug use: No   • Sexual activity: Defer         ALLERGIES  Daliresp [roflumilast], Latex, and Sulfa antibiotics        REVIEW OF SYSTEMS    All systems reviewed and marked as negative except as listed in HPI     PHYSICAL EXAM    I have reviewed the triage vital signs and nursing notes.    ED Triage Vitals [05/10/23 0148]   Temp Heart Rate Resp BP SpO2   98.6 °F (37 °C) 80 18 137/82 93 %      Temp src Heart Rate Source Patient Position BP Location FiO2 (%)   -- -- -- -- --       Physical Exam  Constitutional:       General: He is not in acute distress.     Appearance: He is well-developed.      Interventions: Nasal cannula in place.   HENT:      Head: Normocephalic and atraumatic.   Eyes:      General: No scleral icterus.     Conjunctiva/sclera: Conjunctivae normal.   Neck:      Trachea: No tracheal deviation.   Cardiovascular:      Rate and Rhythm: Normal rate and regular rhythm.   Pulmonary:      Effort: Pulmonary effort is normal.      Breath sounds: Wheezing present.      Comments: Increased work of breathing.  Speaks in broken sentences.  Abdominal:      Palpations: Abdomen is soft.      Tenderness: There is no abdominal tenderness. There is no guarding.   Musculoskeletal:         General: No deformity.      Cervical back: Normal range of motion.      Right lower le+ Edema present.      Left lower le+ Edema present.   Lymphadenopathy:      Cervical: No cervical adenopathy.   Skin:      General: Skin is warm and dry.   Neurological:      Mental Status: He is alert and oriented to person, place, and time.   Psychiatric:         Behavior: Behavior normal.         Vital signs and nursing notes reviewed.            LAB RESULTS  Recent Results (from the past 24 hour(s))   ECG 12 Lead ED Triage Standing Order; SOA    Collection Time: 05/10/23  2:11 AM   Result Value Ref Range    QT Interval 403 ms   Comprehensive Metabolic Panel    Collection Time: 05/10/23  2:18 AM    Specimen: Blood   Result Value Ref Range    Glucose 126 (H) 65 - 99 mg/dL    BUN 21 8 - 23 mg/dL    Creatinine 1.02 0.76 - 1.27 mg/dL    Sodium 142 136 - 145 mmol/L    Potassium 4.1 3.5 - 5.2 mmol/L    Chloride 108 (H) 98 - 107 mmol/L    CO2 25.8 22.0 - 29.0 mmol/L    Calcium 9.3 8.6 - 10.5 mg/dL    Total Protein 6.0 6.0 - 8.5 g/dL    Albumin 3.8 3.5 - 5.2 g/dL    ALT (SGPT) 11 1 - 41 U/L    AST (SGOT) 18 1 - 40 U/L    Alkaline Phosphatase 63 39 - 117 U/L    Total Bilirubin 0.3 0.0 - 1.2 mg/dL    Globulin 2.2 gm/dL    A/G Ratio 1.7 g/dL    BUN/Creatinine Ratio 20.6 7.0 - 25.0    Anion Gap 8.2 5.0 - 15.0 mmol/L    eGFR 72.5 >60.0 mL/min/1.73   BNP    Collection Time: 05/10/23  2:18 AM    Specimen: Blood   Result Value Ref Range    proBNP 239.0 0.0 - 1,800.0 pg/mL   Single High Sensitivity Troponin T    Collection Time: 05/10/23  2:18 AM    Specimen: Blood   Result Value Ref Range    HS Troponin T 32 (H) <15 ng/L   Green Top (Gel)    Collection Time: 05/10/23  2:18 AM   Result Value Ref Range    Extra Tube Hold for add-ons.    Lavender Top    Collection Time: 05/10/23  2:18 AM   Result Value Ref Range    Extra Tube hold for add-on    Gold Top - SST    Collection Time: 05/10/23  2:18 AM   Result Value Ref Range    Extra Tube Hold for add-ons.    Light Blue Top    Collection Time: 05/10/23  2:18 AM   Result Value Ref Range    Extra Tube Hold for add-ons.    CBC Auto Differential    Collection Time: 05/10/23  2:18 AM    Specimen: Blood   Result  Value Ref Range    WBC 6.46 3.40 - 10.80 10*3/mm3    RBC 3.66 (L) 4.14 - 5.80 10*6/mm3    Hemoglobin 10.5 (L) 13.0 - 17.7 g/dL    Hematocrit 32.7 (L) 37.5 - 51.0 %    MCV 89.3 79.0 - 97.0 fL    MCH 28.7 26.6 - 33.0 pg    MCHC 32.1 31.5 - 35.7 g/dL    RDW 13.1 12.3 - 15.4 %    RDW-SD 43.1 37.0 - 54.0 fl    MPV 8.9 6.0 - 12.0 fL    Platelets 227 140 - 450 10*3/mm3    Neutrophil % 56.6 42.7 - 76.0 %    Lymphocyte % 31.7 19.6 - 45.3 %    Monocyte % 11.3 5.0 - 12.0 %    Eosinophil % 0.0 (L) 0.3 - 6.2 %    Basophil % 0.2 0.0 - 1.5 %    Immature Grans % 0.2 0.0 - 0.5 %    Neutrophils, Absolute 3.66 1.70 - 7.00 10*3/mm3    Lymphocytes, Absolute 2.05 0.70 - 3.10 10*3/mm3    Monocytes, Absolute 0.73 0.10 - 0.90 10*3/mm3    Eosinophils, Absolute 0.00 0.00 - 0.40 10*3/mm3    Basophils, Absolute 0.01 0.00 - 0.20 10*3/mm3    Immature Grans, Absolute 0.01 0.00 - 0.05 10*3/mm3    nRBC 0.0 0.0 - 0.2 /100 WBC   Single High Sensitivity Troponin T    Collection Time: 05/10/23  5:03 AM    Specimen: Blood   Result Value Ref Range    HS Troponin T 26 (H) <15 ng/L   Respiratory Panel PCR w/COVID-19(SARS-CoV-2) JARRETT/AMPARO/ROYAL/PAD/COR/MAD/JERMAIN In-House, NP Swab in [facility], 3-4 HR TAT - Swab, Nasopharynx    Collection Time: 05/10/23  7:01 AM    Specimen: Nasopharynx; Swab   Result Value Ref Range    ADENOVIRUS, PCR Not Detected Not Detected    Coronavirus 229E Not Detected Not Detected    Coronavirus HKU1 Not Detected Not Detected    Coronavirus NL63 Not Detected Not Detected    Coronavirus OC43 Not Detected Not Detected    COVID19 Not Detected Not Detected - Ref. Range    Human Metapneumovirus Not Detected Not Detected    Human Rhinovirus/Enterovirus Not Detected Not Detected    Influenza A PCR Not Detected Not Detected    Influenza B PCR Not Detected Not Detected    Parainfluenza Virus 1 Not Detected Not Detected    Parainfluenza Virus 2 Not Detected Not Detected    Parainfluenza Virus 3 Not Detected Not Detected    Parainfluenza Virus 4 Not  Detected Not Detected    RSV, PCR Not Detected Not Detected    Bordetella pertussis pcr Not Detected Not Detected    Bordetella parapertussis PCR Not Detected Not Detected    Chlamydophila pneumoniae PCR Not Detected Not Detected    Mycoplasma pneumo by PCR Not Detected Not Detected       Ordered the above labs and independently reviewed the results.        RADIOLOGY  XR Chest 1 View    Result Date: 5/10/2023  Patient: RAMIRO CASEY  Time Out: 03:04 Exam(s): XR CXR 1 VIEW EXAM:   XR Chest, 1 View CLINICAL HISTORY:    Reason for exam: SOA Triage Protocol. TECHNIQUE:   Frontal view of the chest. COMPARISON:   No relevant prior studies available. FINDINGS:   Lungs:  No consolidation or mass.   Pleural space:  No acute findings   Heart:  No cardiomegaly.   Bones joints:  No acute findings. IMPRESSION:       No acute cardiopulmonary process.     Electronically signed by Evan Bishop MD on 05-10-23 at 0304      I ordered the above noted radiological studies. Independently reviewed by me and discussed with radiologist.  See dictation above for official radiology interpretation.      PROCEDURES    Procedures        MEDICATIONS GIVEN IN ER    Medications   sodium chloride 0.9 % flush 10 mL (has no administration in time range)   ipratropium-albuterol (DUO-NEB) nebulizer solution 3 mL (3 mL Nebulization Given 5/10/23 0653)   methylPREDNISolone sodium succinate (SOLU-Medrol) injection 125 mg (125 mg Intravenous Given 5/10/23 0633)   furosemide (LASIX) injection 60 mg (60 mg Intravenous Given 5/10/23 0829)         PROGRESS, DATA ANALYSIS, CONSULTS, AND MEDICAL DECISION MAKING    All labs have been independently interpreted by me.  All radiology studies have been interpreted by me.  Discussion below represents my analysis of pertinent findings related to patient's condition, differential diagnosis, treatment plan and final disposition.    Patient presentation and work-up consistent with acute exacerbation of COPD with  hypoxia.  He has clinical evidence of hypervolemia as well and has been missing his diuretic doses at home.  Plan to do keep in the hospital for further supportive care, diuresis as needed, along with continued steroids and breathing treatments for COPD.  Patient is agreeable and all questions have been answered.    - Chronic or social conditions impacting care: None      DIFFERENTIAL DIAGNOSIS INCLUDE BUT NOT LIMITED TO:     Differential diagnosis includes but is not limited to:  -COVID  -CHF  -acute coronary syndrome  -pulmonary embolism  -pneumothorax  -pneumonia  -asthma/COPD  -deconditioning  -anemia  -anxiety       Orders placed during this visit:  Orders Placed This Encounter   Procedures   • Respiratory Panel PCR w/COVID-19(SARS-CoV-2) JARRETT/AMPARO/ROYAL/PAD/COR/MAD/JERMAIN In-House, NP Swab in UTM/VTM, 3-4 HR TAT - Swab, Nasopharynx   • XR Chest 1 View   • [place] Draw   • Comprehensive Metabolic Panel   • BNP   • Single High Sensitivity Troponin T   • CBC Auto Differential   • Single High Sensitivity Troponin T   • NPO Diet NPO Type: Strict NPO   • Undress & Gown   • Cardiac Monitoring   • Continuous Pulse Oximetry   • Vital Signs   • LHA (on-call MD unless specified) Details   • Oxygen Therapy- Nasal Cannula; 2 LPM; Titrate for SPO2: equal to or greater than, 92%   • ECG 12 Lead ED Triage Standing Order; SOA   • Insert Peripheral IV   • Initiate Observation Status   • CBC & Differential   • Green Top (Gel)   • Lavender Top   • Gold Top - SST   • Light Blue Top         ED Course as of 05/10/23 0920   Wed May 10, 2023   0617 Per my independent interpretation of the chest x-ray, there is no obvious pneumothorax. [DC]   0918 I discussed the case with GUILHERME Issa with A at this time regarding the patient.  I discussed work-up, results, concerns.  I discussed the consulting provider's desire for tele admit to MD Duran.   [DC]      ED Course User Index  [DC] Jered Hinton PA       AS OF 09:20 EDT VITALS:    BP -  161/73  HR - 76  TEMP - 98.6 °F (37 °C)  02 SATS - 95%        0920 I rechecked the patient.  I discussed the patient's labs, radiology findings (including all incidental findings), diagnosis, and plan for admission. The patient understands and agrees with the plan.      DIAGNOSIS  Final diagnoses:   Acute exacerbation of chronic obstructive pulmonary disease (COPD)   Hypoxia   Other hypervolemia         DISPOSITION  Admit    Pt masked in first look. I wore a surgical mask throughout my encounters with the pt. I performed hand hygiene on entry into the pt room and upon exit.     Dictated utilizing Dragon dictation     Note Disclaimer: At [facility], we believe that sharing information builds trust and better relationships. You are receiving this note because you recently visited [facility]. It is possible you will see health information before a provider has talked with you about it. This kind of information can be easy to misunderstand. To help you fully understand what it means for your health, we urge you to discuss this note with your provider.      Jered Hinton PA  05/10/23 0957

## 2023-05-10 NOTE — H&P
Patient Name:  Tony Leonard  YOB: 1940  MRN:  [id number]  Admit Date:  5/10/2023  Patient Care Team:  Harpreet Kate MD as PCP - General (Internal Medicine)  Katharina Salter MD as Consulting Physician (Pulmonary Disease)  Anum Bhatt MD as Consulting Physician (Pain Medicine)  Azeb Cook, RN as Ambulatory  ([facility])      Subjective   History Present Illness     Chief Complaint   Patient presents with   • Shortness of Breath       Mr. Leonard is a 82 y.o. former smoker with a history of CHF, COPD on intermittent oxygen use, [place], prostate cancer that presents to [facility] complaining of shortness of breath.    Lives alone at [facility] & started with trouble breathing yesterday & notified 911 for ER evaluation.    Oxygen saturation 90% on room air with audible wheezing per ER provider.  Patient received IV Solu-Medrol 125 mg once and IV furosemide 60 mg once given bilateral lower extremity edema in the setting of multiple missed doses diuretics per patient statement.    Patient admitted for COPD exacerbation.      Recommendation pending hospital course.  Details below.    History of Present Illness  Review of Systems   Constitutional: Negative for chills and fever.   HENT: Positive for rhinorrhea. Negative for congestion.    Respiratory: Positive for cough (dry) and shortness of breath.    Cardiovascular: Positive for leg swelling. Negative for chest pain.   Gastrointestinal: Negative for abdominal pain, constipation, diarrhea, nausea and vomiting.   Endocrine: Negative for polydipsia, polyphagia and polyuria.   Genitourinary: Negative for difficulty urinating and dysuria.   Musculoskeletal: Positive for gait problem (chronic rollator). Negative for myalgias.   Skin: Negative for rash and wound.   Neurological: Negative for syncope and light-headedness.   Psychiatric/Behavioral: Negative for confusion and hallucinations.         Personal History     Past Medical History:   Diagnosis Date   • ADHD (attention deficit hyperactivity disorder)    • Bronchiectasis    • CHF (congestive heart failure)    • Colon polyp    • COPD (chronic obstructive pulmonary disease)    • Diverticulosis    • Hard of hearing    • On home oxygen therapy     2 LITERS   • Pneumonia    • Prostate cancer 04/22/2019   • Spinal stenosis, lumbar region with neurogenic claudication 08/02/2022     Past Surgical History:   Procedure Laterality Date   • BRONCHOSCOPY N/A 4/30/2016    Procedure: BRONCHOSCOPY with BAL of right lower lobe and left  lower lobe.  ;  Surgeon: Nando Diaz MD;  Location: [facility] ENDOSCOPY;  Service:    • BRONCHOSCOPY N/A 4/28/2017    Procedure: BRONCHOSCOPY;  Surgeon: Katharina Salter MD;  Location: [facility] ENDOSCOPY;  Service:    • BRONCHOSCOPY Bilateral 6/29/2017    Procedure: BRONCHOSCOPY WITH WASHINGS;  Surgeon: Katharina Salter MD;  Location: [facility] ENDOSCOPY;  Service:    • BRONCHOSCOPY N/A 1/22/2018    Procedure: BRONCHOSCOPY AT BEDSIDE with BAL;  Surgeon: Katharina Salter MD;  Location: [facility] ENDOSCOPY;  Service:    • BRONCHOSCOPY N/A 1/30/2018    Procedure: BRONCHOSCOPY with BAL and washing;  Surgeon: Shreyas Wild MD;  Location: [facility] ENDOSCOPY;  Service:    • BRONCHOSCOPY Bilateral 4/24/2018    Procedure: BRONCHOSCOPY WITH WASHING;  Surgeon: Katharina Salter MD;  Location: [facility] ENDOSCOPY;  Service: Pulmonary   • BRONCHOSCOPY N/A 12/28/2019    Procedure: BRONCHOSCOPY with bilateral washing;  Surgeon: Katharina Salter MD;  Location: [facility] ENDOSCOPY;  Service: Pulmonary   • BRONCHOSCOPY Bilateral 1/4/2023    Procedure: BRONCHOSCOPY with lavage;  Surgeon: Katharina Salter MD;  Location: [facility] ENDOSCOPY;  Service: Pulmonary;  Laterality: Bilateral;  mucus plugging   • CARDIAC CATHETERIZATION N/A 1/29/2023    Procedure: Left Heart Cath;  Surgeon: Johnnie Ang MD;  Location: [facility] CATH INVASIVE LOCATION;  Service: Cardiology;   Laterality: N/A;   • CARDIAC CATHETERIZATION N/A 1/29/2023    Procedure: Coronary angiography;  Surgeon: Johnnie Ang MD;  Location: [facility] CATH INVASIVE LOCATION;  Service: Cardiology;  Laterality: N/A;   • COLONOSCOPY  05/17/2013    eh, ih, tort, sig tics   • COLONOSCOPY N/A 5/10/2019    Non-thrombosed external hemorrhoids found on perianal exam, Diverticulosis, Tortuous colon, One 5 mm polyp in the mid ascending colon, IH. Path: Tubular adenoma.    • COLONOSCOPY N/A 9/24/2022    Procedure: COLONOSCOPY to cecum and TI with argon plasma coagulation.;  Surgeon: Bruce Black MD;  Location: [facility] ENDOSCOPY;  Service: Gastroenterology;  Laterality: N/A;  pre- GI bleeding  post- radiation proctitis, diverticulosis   • ENDOSCOPY  03/19/2015    z line irreg, hh   • ENDOSCOPY N/A 9/24/2022    Procedure: ESOPHAGOGASTRODUODENOSCOPY;  Surgeon: Bruce Black MD;  Location: [facility] ENDOSCOPY;  Service: Gastroenterology;  Laterality: N/A;  pre- GI bleeding  post- esophagitis, hiatal hernia   • HIP OPEN REDUCTION Right 1/17/2018    Procedure: HIP OPEN REDUCTION INTERNAL FIXATION WITH DYNAMIC HIP SCREW;  Surgeon: Les Black MD;  Location: [facility] OR;  Service:    • SPINE SURGERY     • TONSILLECTOMY     • TOTAL HIP ARTHROPLASTY REVISION Right 9/23/2019    Procedure: HIP  REVISION RIGHT;  Surgeon: Isauro Warner MD;  Location: [facility] OR;  Service: Orthopedics     Family History   Problem Relation Age of Onset   • Thyroid disease Mother    • No Known Problems Father    • Malig Hyperthermia Neg Hx      Social History     Tobacco Use   • Smoking status: Never   • Smokeless tobacco: Never   Vaping Use   • Vaping Use: Never used   Substance Use Topics   • Alcohol use: No     Comment: red wine occassional   • Drug use: No     No current facility-administered medications on file prior to encounter.     Current Outpatient Medications on File Prior to Encounter   Medication Sig Dispense Refill   •  acetaminophen (TYLENOL) 500 MG tablet Take 1 tablet by mouth Every 6 (Six) Hours As Needed for Mild Pain.     • albuterol (PROVENTIL) (2.5 MG/3ML) 0.083% nebulizer solution Take 2.5 mg by nebulization Every 6 (Six) Hours As Needed for Wheezing. 360 mL 3   • apixaban (ELIQUIS) 5 MG tablet tablet Take 1 tablet by mouth 2 (Two) Times a Day. 60 tablet 5   • benzonatate (TESSALON) 200 MG capsule Take 1 capsule by mouth 3 (Three) Times a Day As Needed for Cough.     • budesonide (PULMICORT) 180 MCG/ACT inhaler Inhale 2 puffs 2 (Two) Times a Day. 1 each 3   • calcium carbonate (TUMS) 500 MG chewable tablet Chew 1 tablet As Needed for Indigestion or Heartburn.     • clotrimazole-betamethasone (Lotrisone) 1-0.05 % cream Apply 1 application topically to the appropriate area as directed 2 (Two) Times a Day. Do not apply to scrotum 45 g 1   • FLUoxetine (PROzac) 20 MG capsule TAKE 1 CAPSULE DAILY 90 capsule 3   • fluticasone (FLONASE) 50 MCG/ACT nasal spray 2 sprays by Each Nare route Daily. 11.1 mL 1   • furosemide (LASIX) 40 MG tablet Take 1 tablet by mouth Daily.     • gabapentin (NEURONTIN) 300 MG capsule Take 1 capsule by mouth 3 (Three) Times a Day. 270 capsule 1   • Mirabegron ER (Myrbetriq) 25 MG tablet sustained-release 24 hour 24 hr tablet Take 1 tablet by mouth Daily.     • Multiple Vitamins-Minerals (MULTIVITAMIN ADULT PO) Take 1 tablet by mouth Daily.     • pantoprazole (PROTONIX) 40 MG EC tablet Take 1 tablet by mouth Daily. 30 tablet 0   • potassium chloride (K-DUR,KLOR-CON) 20 MEQ CR tablet Take 1 tablet by mouth Daily.     • rOPINIRole (REQUIP) 0.5 MG tablet Take 1 tablet by mouth Every Night. Take 1 hour before bedtime. (Patient taking differently: Take 1 tablet by mouth Every Night.) 90 tablet 1   • terbinafine (lamiSIL) 250 MG tablet Take 1 tablet by mouth Daily.     • azelastine (ASTELIN) 0.1 % nasal spray 2 sprays into the nostril(s) as directed by provider 2 (Two) Times a Day. Use in each nostril as  directed 30 mL 0   • guaifenesin (ROBITUSSIN) 100 MG/5ML liquid Take 10 mL by mouth Every 4 (Four) Hours As Needed for Cough. 118 mL 1   • naproxen (Naprosyn) 500 MG tablet Take 1 tablet by mouth 2 (Two) Times a Day With Meals. 180 tablet 3   • sodium chloride 7 % nebulizer solution nebulizer solution Inhale 1 vial 2 (Two) Times a Day.       Allergies   Allergen Reactions   • Daliresp [Roflumilast] Anaphylaxis   • Latex Rash   • Sulfa Antibiotics Rash       Objective    Objective     Vital Signs  Temp:  [98.6 °F (37 °C)-98.7 °F (37.1 °C)] 98.7 °F (37.1 °C)  Heart Rate:  [] 78  Resp:  [18-20] 18  BP: (125-161)/(73-87) 143/87  SpO2:  [90 %-97 %] 95 %  on  Flow (L/min):  [2] 2;   Device (Oxygen Therapy): room air  Body mass index is 24.69 kg/m².    Physical Exam  Constitutional:       General: He is not in acute distress.     Appearance: He is not toxic-appearing.      Comments: Elderly and generally weak   HENT:      Head: Normocephalic and atraumatic.   Eyes:      Extraocular Movements: Extraocular movements intact.      Conjunctiva/sclera: Conjunctivae normal.   Cardiovascular:      Rate and Rhythm: Normal rate.      Heart sounds: Normal heart sounds.   Pulmonary:      Effort: Pulmonary effort is normal.      Breath sounds: Wheezing present.   Abdominal:      General: Bowel sounds are normal.      Palpations: Abdomen is soft.   Musculoskeletal:      Cervical back: Normal range of motion and neck supple.      Right lower leg: Edema present.      Left lower leg: Edema present.   Skin:     General: Skin is warm and dry.   Neurological:      Mental Status: He is alert and oriented to person, place, and time.      Cranial Nerves: No cranial nerve deficit.   Psychiatric:         Behavior: Behavior normal.         Thought Content: Thought content normal.         Results Review:  I reviewed the patient's new clinical results.  I reviewed the patient's new imaging results and agree with the interpretation.  I reviewed  the patient's other test results and agree with the interpretation  I personally viewed and interpreted the patient's EKG/Telemetry data  Discussed with ED provider.    Lab Results (last 24 hours)     Procedure Component Value Units Date/Time    CBC & Differential [[id number]]  (Abnormal) Collected: 05/10/23 0218    Specimen: Blood Updated: 05/10/23 0234    Narrative:      The following orders were created for panel order CBC & Differential.  Procedure                               Abnormality         Status                     ---------                               -----------         ------                     CBC Auto Differential[[id number]]        Abnormal            Final result                 Please view results for these tests on the individual orders.    Comprehensive Metabolic Panel [[id number]]  (Abnormal) Collected: 05/10/23 0218    Specimen: Blood Updated: 05/10/23 0249     Glucose 126 mg/dL      BUN 21 mg/dL      Creatinine 1.02 mg/dL      Sodium 142 mmol/L      Potassium 4.1 mmol/L      Chloride 108 mmol/L      CO2 25.8 mmol/L      Calcium 9.3 mg/dL      Total Protein 6.0 g/dL      Albumin 3.8 g/dL      ALT (SGPT) 11 U/L      AST (SGOT) 18 U/L      Alkaline Phosphatase 63 U/L      Total Bilirubin 0.3 mg/dL      Globulin 2.2 gm/dL      A/G Ratio 1.7 g/dL      BUN/Creatinine Ratio 20.6     Anion Gap 8.2 mmol/L      eGFR 72.5 mL/min/1.73     Narrative:      GFR Normal >60  Chronic Kidney Disease <60  Kidney Failure <15    The GFR formula is only valid for adults with stable renal function between ages 18 and 70.    BNP [[id number]]  (Normal) Collected: 05/10/23 0218    Specimen: Blood Updated: 05/10/23 0247     proBNP 239.0 pg/mL     Narrative:      Among patients with dyspnea, NT-proBNP is highly sensitive for the detection of acute congestive heart failure. In addition NT-proBNP of <300 pg/ml effectively rules out acute congestive heart failure with 99% negative predictive value.    Results may be  falsely decreased if patient taking Biotin.      Single High Sensitivity Troponin T [[id number]]  (Abnormal) Collected: 05/10/23 0218    Specimen: Blood Updated: 05/10/23 0249     HS Troponin T 32 ng/L     Narrative:      High Sensitive Troponin T Reference Range:  <10.0 ng/L- Negative Female for AMI  <15.0 ng/L- Negative Male for AMI  >=10 - Abnormal Female indicating possible myocardial injury.  >=15 - Abnormal Male indicating possible myocardial injury.   Clinicians would have to utilize clinical acumen, EKG, Troponin, and serial changes to determine if it is an Acute Myocardial Infarction or myocardial injury due to an underlying chronic condition.         CBC Auto Differential [[id number]]  (Abnormal) Collected: 05/10/23 0218    Specimen: Blood Updated: 05/10/23 0234     WBC 6.46 10*3/mm3      RBC 3.66 10*6/mm3      Hemoglobin 10.5 g/dL      Hematocrit 32.7 %      MCV 89.3 fL      MCH 28.7 pg      MCHC 32.1 g/dL      RDW 13.1 %      RDW-SD 43.1 fl      MPV 8.9 fL      Platelets 227 10*3/mm3      Neutrophil % 56.6 %      Lymphocyte % 31.7 %      Monocyte % 11.3 %      Eosinophil % 0.0 %      Basophil % 0.2 %      Immature Grans % 0.2 %      Neutrophils, Absolute 3.66 10*3/mm3      Lymphocytes, Absolute 2.05 10*3/mm3      Monocytes, Absolute 0.73 10*3/mm3      Eosinophils, Absolute 0.00 10*3/mm3      Basophils, Absolute 0.01 10*3/mm3      Immature Grans, Absolute 0.01 10*3/mm3      nRBC 0.0 /100 WBC     Single High Sensitivity Troponin T [[id number]]  (Abnormal) Collected: 05/10/23 0503    Specimen: Blood Updated: 05/10/23 0530     HS Troponin T 26 ng/L     Narrative:      High Sensitive Troponin T Reference Range:  <10.0 ng/L- Negative Female for AMI  <15.0 ng/L- Negative Male for AMI  >=10 - Abnormal Female indicating possible myocardial injury.  >=15 - Abnormal Male indicating possible myocardial injury.   Clinicians would have to utilize clinical acumen, EKG, Troponin, and serial changes to determine if it is an  Acute Myocardial Infarction or myocardial injury due to an underlying chronic condition.         Respiratory Panel PCR w/COVID-19(SARS-CoV-2) JARRETT/AMPARO/ROYAL/PAD/COR/MAD/JERMAIN In-House, NP Swab in UTM/VTM, 3-4 HR TAT - Swab, Nasopharynx [[id number]]  (Normal) Collected: 05/10/23 0701    Specimen: Swab from Nasopharynx Updated: 05/10/23 0807     ADENOVIRUS, PCR Not Detected     Coronavirus 229E Not Detected     Coronavirus HKU1 Not Detected     Coronavirus NL63 Not Detected     Coronavirus OC43 Not Detected     COVID19 Not Detected     Human Metapneumovirus Not Detected     Human Rhinovirus/Enterovirus Not Detected     Influenza A PCR Not Detected     Influenza B PCR Not Detected     Parainfluenza Virus 1 Not Detected     Parainfluenza Virus 2 Not Detected     Parainfluenza Virus 3 Not Detected     Parainfluenza Virus 4 Not Detected     RSV, PCR Not Detected     Bordetella pertussis pcr Not Detected     Bordetella parapertussis PCR Not Detected     Chlamydophila pneumoniae PCR Not Detected     Mycoplasma pneumo by PCR Not Detected    Narrative:      In the setting of a positive respiratory panel with a viral infection PLUS a negative procalcitonin without other underlying concern for bacterial infection, consider observing off antibiotics or discontinuation of antibiotics and continue supportive care. If the respiratory panel is positive for atypical bacterial infection (Bordetella pertussis, Chlamydophila pneumoniae, or Mycoplasma pneumoniae), consider antibiotic de-escalation to target atypical bacterial infection.          Imaging Results (Last 24 Hours)     Procedure Component Value Units Date/Time    XR Chest 1 View [[id number]] Collected: 05/10/23 0305     Updated: 05/10/23 0305    Narrative:        Patient: RAMIRO CASEY  Time Out: 03:04  Exam(s): XR CXR 1 VIEW     EXAM:    XR Chest, 1 View    CLINICAL HISTORY:     Reason for exam: SOA Triage Protocol.    TECHNIQUE:    Frontal view of the chest.    COMPARISON:    No  relevant prior studies available.    FINDINGS:    Lungs:  No consolidation or mass.    Pleural space:  No acute findings    Heart:  No cardiomegaly.    Bones joints:  No acute findings.    IMPRESSION:         No acute cardiopulmonary process.      Impression:          Electronically signed by Evan Bishop MD on 05-10-23 at 0304          Results for orders placed during the hospital encounter of 01/28/23    Adult Transthoracic Echo Complete W/ Cont if Necessary Per Protocol    Interpretation Summary  •  Left ventricular ejection fraction appears to be 51 - 55%.  •  Left ventricular diastolic function was normal.  •  Left atrial volume is mildly increased.  •  There is calcification of the aortic valve.  •  Estimated right ventricular systolic pressure from tricuspid regurgitation is mildly elevated (35-45 mmHg).  •  Moderate dilation of the aortic root is present. Mild dilation of the sinuses of Valsalva is present. Moderate dilation of the ascending aorta is present.      ECG 12 Lead ED Triage Standing Order; SOA   Preliminary Result   HEART RATE= 82  bpm   RR Interval= 732  ms   HI Interval= 195  ms   P Horizontal Axis= -2  deg   P Front Axis= 14  deg   QRSD Interval= 84  ms   QT Interval= 403  ms   QRS Axis= 2  deg   T Wave Axis= 51  deg   - OTHERWISE NORMAL ECG -   Sinus rhythm   Ventricular premature complex   Abnormal R-wave progression, early transition   Electronically Signed By:    Date and Time of Study: 2023-05-10 02:11:51           Assessment/Plan     Active Hospital Problems    Diagnosis  POA   • **Acute exacerbation of chronic obstructive pulmonary disease (COPD) [J44.1]  Yes   • Anemia [D64.9]  Yes   • Chronic anticoagulation [Z79.01]  Not Applicable   • Paroxysmal atrial fibrillation [I48.0]  Yes   • HTN (hypertension) [I10]  Yes   • Chronic diastolic CHF (congestive heart failure) ([facility]) [I50.32]  Yes   • Acute on chronic respiratory failure with hypoxia ([facility]) [J96.21]  Yes      Resolved Hospital  Problems   No resolved problems to display.       Mr. Leonard is a 82 y.o. former smoker with a history of CHF, COPD on intermittent oxygen use, [place], prostate cancer that presents to [facility] complaining of shortness of breath.      Acute exacerbation of chronic obstructive pulmonary disease (COPD): Wheezing on auscultation.  Status post IV Solu-Medrol 125 mg once.  Provide IV Solu-Medrol 60 mg p.o. twice daily, Pulmicort, Flonase, DuoNeb scheduled and as needed.      Acute on chronic respiratory failure with hypoxia (HCC): Due to above.  Provide supplemental oxygen maintain O2 saturation 90-95%.      Chronic diastolic CHF (congestive heart failure) (HCC): Unremarkable proBNP 239 and chest x-ray.  Continue frusemide 40 mg p.o. daily with KCl supplement.  Low-sodium diet.      HTN (hypertension): BP acceptable acutely.  Continue blood pressure.      Paroxysmal atrial fibrillation /  Chronic anticoagulation: Rate controlled.  Anticoagulation continued.      Anemia: Chronic and similar compared to priors.  No overt signs of active bleeding.  Repeat labs in AM.    · I discussed the patient's findings and my recommendations with patient, RN, & Dr. Garzon.    VTE Prophylaxis - apixaban (home med)  Code Status - Full code.       PABLITO Dobson  [facility]  05/10/23  11:54 EDT

## 2023-05-10 NOTE — PLAN OF CARE
Goal Outcome Evaluation:         New admission with COPD exacerbation. A&Ox4. RA. Standby assist with rolling walker. SR on monitor; hx of afib, on eliquis. Chronic pain, patient states that it is not arthritic but a pinched nerve. Cardiac diet, tolerating diet. IV lasix and IV solumedrol given in ED. PO lasix and IV solumedrol given by this RN. Increased urination, no difficulties noted. Urinal provided. No patient complaints at this time. VSS. All needs met.       Daily Care Plan Summary: Heart Failure    Diuretic in use (IV or PO): PO lasix       Daily weight (up or down):  New admission    Output > Intake (yes/no): yes     O2 Requirements (current, any change?): RA    Symptoms noted with Activity (Respiratory Tolerance, functional state):  No c/o of respiratory changes with activity     Anticipated Discharge Plans:  Lives at home alone

## 2023-05-10 NOTE — ED NOTES
"Pt arrived via [facility] from home c/o SOA that started \"a couple hours ago:\". EMS reports pt had wheezing prior to administration of duoneb. Pt is on 2L NC Prn at home. Pt is suppose to take Lasix but reportedly refuses to take it because he \"doesn't want to pee himself\"   "

## 2023-05-10 NOTE — ED NOTES
Nursing report ED to floor  Tony Leonard  82 y.o.  male    HPI :   Chief Complaint   Patient presents with    Shortness of Breath       Admitting doctor:   Olaf Garzon MD    Admitting diagnosis:   The primary encounter diagnosis was Acute exacerbation of chronic obstructive pulmonary disease (COPD). Diagnoses of Hypoxia and Other hypervolemia were also pertinent to this visit.    Code status:   Current Code Status       Date Active Code Status Order ID Comments User Context       Prior            Allergies:   Daliresp [roflumilast], Latex, and Sulfa antibiotics    Isolation:   No active isolations    Intake and Output    Intake/Output Summary (Last 24 hours) at 5/10/2023 0935  Last data filed at 5/10/2023 0628  Gross per 24 hour   Intake 150 ml   Output 350 ml   Net -200 ml       Weight:       05/10/23  0148   Weight: 78.5 kg (173 lb)       Most recent vitals:   Vitals:    05/10/23 0831 05/10/23 0900 05/10/23 0901 05/10/23 0902   BP: 154/79  161/73    Pulse: 76 75 71 76   Resp:       Temp:       SpO2: 97% 96% 95%    Weight:       Height:           Active LDAs/IV Access:   Lines, Drains & Airways       Active LDAs       Name Placement date Placement time Site Days    Peripheral IV 05/10/23 0214 Anterior;Right Forearm 05/10/23  0214  Forearm  less than 1                    Labs (abnormal labs have a star):   Labs Reviewed   COMPREHENSIVE METABOLIC PANEL - Abnormal; Notable for the following components:       Result Value    Glucose 126 (*)     Chloride 108 (*)     All other components within normal limits    Narrative:     GFR Normal >60  Chronic Kidney Disease <60  Kidney Failure <15    The GFR formula is only valid for adults with stable renal function between ages 18 and 70.   SINGLE HSTROPONIN T - Abnormal; Notable for the following components:    HS Troponin T 32 (*)     All other components within normal limits    Narrative:     High Sensitive Troponin T Reference Range:  <10.0 ng/L- Negative Female for  AMI  <15.0 ng/L- Negative Male for AMI  >=10 - Abnormal Female indicating possible myocardial injury.  >=15 - Abnormal Male indicating possible myocardial injury.   Clinicians would have to utilize clinical acumen, EKG, Troponin, and serial changes to determine if it is an Acute Myocardial Infarction or myocardial injury due to an underlying chronic condition.        CBC WITH AUTO DIFFERENTIAL - Abnormal; Notable for the following components:    RBC 3.66 (*)     Hemoglobin 10.5 (*)     Hematocrit 32.7 (*)     Eosinophil % 0.0 (*)     All other components within normal limits   SINGLE HSTROPONIN T - Abnormal; Notable for the following components:    HS Troponin T 26 (*)     All other components within normal limits    Narrative:     High Sensitive Troponin T Reference Range:  <10.0 ng/L- Negative Female for AMI  <15.0 ng/L- Negative Male for AMI  >=10 - Abnormal Female indicating possible myocardial injury.  >=15 - Abnormal Male indicating possible myocardial injury.   Clinicians would have to utilize clinical acumen, EKG, Troponin, and serial changes to determine if it is an Acute Myocardial Infarction or myocardial injury due to an underlying chronic condition.        RESPIRATORY PANEL PCR W/ COVID-19 (SARS-COV-2) [facility]RETT/[facility]PARO/[facility]AL/[facility] IN-HOUSE, NP [facility] IN [facility], 3-4 HR TAT - Normal    Narrative:     In the setting of a positive respiratory panel with a viral infection PLUS a negative procalcitonin without other underlying concern for bacterial infection, consider observing off antibiotics or discontinuation of antibiotics and continue supportive care. If the respiratory panel is positive for atypical bacterial infection (Bordetella pertussis, Chlamydophila pneumoniae, or Mycoplasma pneumoniae), consider antibiotic de-escalation to target atypical bacterial infection.   BNP (IN-HOUSE) - Normal    Narrative:     Among patients with dyspnea, NT-proBNP is highly sensitive for the detection of acute congestive  heart failure. In addition NT-proBNP of <300 pg/ml effectively rules out acute congestive heart failure with 99% negative predictive value.    Results may be falsely decreased if patient taking Biotin.     RAINBOW DRAW    Narrative:     The following orders were created for panel order [place] Draw.  Procedure                               Abnormality         Status                     ---------                               -----------         ------                     Green Top (Gel)[[id number]]                                  Final result               Lavender Top[[id number]]                                     Final result               Gold Top - SST[[id number]]                                   Final result               Light Blue Top[[id number]]                                   Final result                 Please view results for these tests on the individual orders.   CBC AND DIFFERENTIAL    Narrative:     The following orders were created for panel order CBC & Differential.  Procedure                               Abnormality         Status                     ---------                               -----------         ------                     CBC Auto Differential[[id number]]        Abnormal            Final result                 Please view results for these tests on the individual orders.   GREEN TOP   LAVENDER TOP   GOLD TOP - SST   LIGHT BLUE TOP       EKG:   ECG 12 Lead ED Triage Standing Order; SOA   Preliminary Result   HEART RATE= 82  bpm   RR Interval= 732  ms   [place] Interval= 195  ms   P Horizontal Axis= -2  deg   P Front Axis= 14  deg   QRSD Interval= 84  ms   QT Interval= 403  ms   QRS Axis= 2  deg   T Wave Axis= 51  deg   - OTHERWISE NORMAL ECG -   Sinus rhythm   Ventricular premature complex   Abnormal R-wave progression, early transition   Electronically Signed By:    Date and Time of Study: 2023-05-10 02:11:51          Meds given in ED:   Medications   sodium chloride 0.9 % flush 10 mL  (has no administration in time range)   ipratropium-albuterol (DUO-NEB) nebulizer solution 3 mL (3 mL Nebulization Given 5/10/23 0653)   methylPREDNISolone sodium succinate (SOLU-Medrol) injection 125 mg (125 mg Intravenous Given 5/10/23 0633)   furosemide (LASIX) injection 60 mg (60 mg Intravenous Given 5/10/23 0829)       Imaging results:  XR Chest 1 View    Result Date: 5/10/2023  Electronically signed by Evan Bishop MD on 05-10-23 at 0304     Ambulatory status:   - ax2    Social issues:   Social History     Socioeconomic History    Marital status: Single   Tobacco Use    Smoking status: Never    Smokeless tobacco: Never   Vaping Use    Vaping Use: Never used   Substance and Sexual Activity    Alcohol use: No     Comment: red wine occassional    Drug use: No    Sexual activity: Defer       NIH Stroke Scale:         Erich Wu RN  05/10/23 09:35 EDT

## 2023-05-10 NOTE — ED PROVIDER NOTES
"The GUILHERME and I have discussed this patient's history, physical exam, and treatment plan.  I have reviewed the documentation and personally had a face to face interaction with the patient. I affirm the documentation and agree with the treatment and plan.  The attached note describes my personal findings.      I provided a substantive portion of the care of the patient.  I personally performed the physical exam in its entirety, and below are my findings.     Brief history of present illness: 82-year-old male with both a history of COPD as well as CHF presents complaining of increased shortness of breath especially with exertion.  He really noted that symptoms seem worse since yesterday.  Some occasional cough.    Physical exam:    /82   Pulse 101   Temp 98.6 °F (37 °C)   Resp 18   Ht 180.3 cm (71\")   Wt 78.5 kg (173 lb)   SpO2 91%   BMI 24.13 kg/m²       Physical Exam   Constitutional: No distress.  Nontoxic but chronically ill-appearing  HENT:  Head: Normocephalic and atraumatic.   Oropharynx: Mucous membranes are moist.   Eyes: . No scleral icterus. No conjunctival pallor.  Neck: Normal range of motion. Neck supple.   Cardiovascular: Pink warm and well perfused throughout.    Pulmonary/Chest: No respiratory distress.  Some expiratory wheezing and mild crackles left greater than right.    Abdominal: Soft. There is no tenderness. There is no rebound and no guarding.   Musculoskeletal: Moves all extremities equally.    Neurological: Alert and oriented.   Skin: Skin is pink, warm, and dry.   Psychiatric: Mood and affect normal.   Nursing note and vitals reviewed.        MDM:  RADIOLOGY      Study: Single view portable chest  Findings: Low inspiratory volume; no pneumothorax or focal infiltrate appreciated  I independently viewed and interpreted these images contemporaneously with treatment.     I have personally reviewed and interpreted the EKG obtained today in the emergency department at 0211 concomitant " with treatment.  EKG reveals rhythm/rate -sinus, 80 with 1 PVC present. AK-prominent P waves.  QRS-narrow complex ST/T-wave -no STEMI appreciated        Elderly gentleman with significant chronic disease seems to be suffering from a exacerbation of COPD for certain and potentially a little bit of CHF.  Patient likely to benefit from admission.  Patient agreeable with plan.       Roderick Wu MD  05/10/23 0829

## 2023-05-11 ENCOUNTER — READMISSION MANAGEMENT (OUTPATIENT)
Dept: CALL CENTER | Facility: HOSPITAL | Age: 83
End: 2023-05-11
Payer: MEDICARE

## 2023-05-11 VITALS
DIASTOLIC BLOOD PRESSURE: 70 MMHG | BODY MASS INDEX: 24.78 KG/M2 | OXYGEN SATURATION: 90 % | HEART RATE: 107 BPM | RESPIRATION RATE: 20 BRPM | WEIGHT: 177 LBS | TEMPERATURE: 97.6 F | SYSTOLIC BLOOD PRESSURE: 128 MMHG | HEIGHT: 71 IN

## 2023-05-11 PROBLEM — Z91.199 NON-COMPLIANT PATIENT: Status: ACTIVE | Noted: 2023-05-11

## 2023-05-11 LAB
ANION GAP SERPL CALCULATED.3IONS-SCNC: 13.3 MMOL/L (ref 5–15)
BUN SERPL-MCNC: 28 MG/DL (ref 8–23)
BUN/CREAT SERPL: 26.9 (ref 7–25)
CALCIUM SPEC-SCNC: 9.3 MG/DL (ref 8.6–10.5)
CHLORIDE SERPL-SCNC: 101 MMOL/L (ref 98–107)
CO2 SERPL-SCNC: 26.7 MMOL/L (ref 22–29)
CREAT SERPL-MCNC: 1.04 MG/DL (ref 0.76–1.27)
DEPRECATED RDW RBC AUTO: 43.8 FL (ref 37–54)
EGFRCR SERPLBLD CKD-EPI 2021: 70.8 ML/MIN/1.73
ERYTHROCYTE [DISTWIDTH] IN BLOOD BY AUTOMATED COUNT: 13.4 % (ref 12.3–15.4)
GLUCOSE SERPL-MCNC: 155 MG/DL (ref 65–99)
HCT VFR BLD AUTO: 34.6 % (ref 37.5–51)
HGB BLD-MCNC: 11.2 G/DL (ref 13–17.7)
MCH RBC QN AUTO: 28.8 PG (ref 26.6–33)
MCHC RBC AUTO-ENTMCNC: 32.4 G/DL (ref 31.5–35.7)
MCV RBC AUTO: 88.9 FL (ref 79–97)
PLATELET # BLD AUTO: 255 10*3/MM3 (ref 140–450)
PMV BLD AUTO: 9.5 FL (ref 6–12)
POTASSIUM SERPL-SCNC: 4.3 MMOL/L (ref 3.5–5.2)
RBC # BLD AUTO: 3.89 10*6/MM3 (ref 4.14–5.8)
SODIUM SERPL-SCNC: 141 MMOL/L (ref 136–145)
WBC NRBC COR # BLD: 7.31 10*3/MM3 (ref 3.4–10.8)

## 2023-05-11 PROCEDURE — 97530 THERAPEUTIC ACTIVITIES: CPT

## 2023-05-11 PROCEDURE — 80048 BASIC METABOLIC PNL TOTAL CA: CPT | Performed by: NURSE PRACTITIONER

## 2023-05-11 PROCEDURE — 36415 COLL VENOUS BLD VENIPUNCTURE: CPT | Performed by: NURSE PRACTITIONER

## 2023-05-11 PROCEDURE — 94799 UNLISTED PULMONARY SVC/PX: CPT

## 2023-05-11 PROCEDURE — G0378 HOSPITAL OBSERVATION PER HR: HCPCS

## 2023-05-11 PROCEDURE — 25010000002 METHYLPREDNISOLONE PER 125 MG: Performed by: NURSE PRACTITIONER

## 2023-05-11 PROCEDURE — 94664 DEMO&/EVAL PT USE INHALER: CPT

## 2023-05-11 PROCEDURE — 97535 SELF CARE MNGMENT TRAINING: CPT | Performed by: OCCUPATIONAL THERAPIST

## 2023-05-11 PROCEDURE — 94761 N-INVAS EAR/PLS OXIMETRY MLT: CPT

## 2023-05-11 PROCEDURE — 97165 OT EVAL LOW COMPLEX 30 MIN: CPT | Performed by: OCCUPATIONAL THERAPIST

## 2023-05-11 PROCEDURE — 97162 PT EVAL MOD COMPLEX 30 MIN: CPT

## 2023-05-11 PROCEDURE — 96376 TX/PRO/DX INJ SAME DRUG ADON: CPT

## 2023-05-11 PROCEDURE — 85027 COMPLETE CBC AUTOMATED: CPT | Performed by: NURSE PRACTITIONER

## 2023-05-11 RX ORDER — PREDNISONE 20 MG/1
40 TABLET ORAL DAILY
Qty: 10 TABLET | Refills: 0 | Status: SHIPPED | OUTPATIENT
Start: 2023-05-11 | End: 2023-05-18

## 2023-05-11 RX ADMIN — ROPINIROLE HYDROCHLORIDE 0.5 MG: 0.5 TABLET, FILM COATED ORAL at 00:17

## 2023-05-11 RX ADMIN — GABAPENTIN 300 MG: 300 CAPSULE ORAL at 08:42

## 2023-05-11 RX ADMIN — CLOTRIMAZOLE AND BETAMETHASONE DIPROPIONATE 1 APPLICATION: 10; .5 CREAM TOPICAL at 08:44

## 2023-05-11 RX ADMIN — IPRATROPIUM BROMIDE AND ALBUTEROL SULFATE 3 ML: 2.5; .5 SOLUTION RESPIRATORY (INHALATION) at 15:31

## 2023-05-11 RX ADMIN — FUROSEMIDE 40 MG: 40 TABLET ORAL at 08:42

## 2023-05-11 RX ADMIN — IPRATROPIUM BROMIDE AND ALBUTEROL SULFATE 3 ML: 2.5; .5 SOLUTION RESPIRATORY (INHALATION) at 07:41

## 2023-05-11 RX ADMIN — FLUTICASONE PROPIONATE 2 SPRAY: 50 SPRAY, METERED NASAL at 08:44

## 2023-05-11 RX ADMIN — POTASSIUM CHLORIDE 20 MEQ: 750 TABLET, EXTENDED RELEASE ORAL at 08:43

## 2023-05-11 RX ADMIN — GABAPENTIN 300 MG: 300 CAPSULE ORAL at 16:05

## 2023-05-11 RX ADMIN — BUDESONIDE 0.5 MG: 0.5 INHALANT ORAL at 07:43

## 2023-05-11 RX ADMIN — IPRATROPIUM BROMIDE AND ALBUTEROL SULFATE 3 ML: 2.5; .5 SOLUTION RESPIRATORY (INHALATION) at 11:32

## 2023-05-11 RX ADMIN — ACETAMINOPHEN 650 MG: 325 TABLET ORAL at 00:17

## 2023-05-11 RX ADMIN — DOCUSATE SODIUM 50MG AND SENNOSIDES 8.6MG 2 TABLET: 8.6; 5 TABLET, FILM COATED ORAL at 08:42

## 2023-05-11 RX ADMIN — METHYLPREDNISOLONE SODIUM SUCCINATE 60 MG: 125 INJECTION, POWDER, FOR SOLUTION INTRAMUSCULAR; INTRAVENOUS at 12:05

## 2023-05-11 RX ADMIN — PANTOPRAZOLE SODIUM 40 MG: 40 TABLET, DELAYED RELEASE ORAL at 08:42

## 2023-05-11 RX ADMIN — CLOTRIMAZOLE AND BETAMETHASONE DIPROPIONATE 1 APPLICATION: 10; .5 CREAM TOPICAL at 00:17

## 2023-05-11 RX ADMIN — APIXABAN 5 MG: 5 TABLET, FILM COATED ORAL at 08:43

## 2023-05-11 RX ADMIN — FLUOXETINE HYDROCHLORIDE 20 MG: 20 CAPSULE ORAL at 08:42

## 2023-05-11 RX ADMIN — Medication 10 ML: at 08:44

## 2023-05-11 RX ADMIN — METHYLPREDNISOLONE SODIUM SUCCINATE 60 MG: 125 INJECTION, POWDER, FOR SOLUTION INTRAMUSCULAR; INTRAVENOUS at 00:20

## 2023-05-11 NOTE — PLAN OF CARE
Goal Outcome Evaluation:  Plan of Care Reviewed With: patient        Progress: improving  Outcome Evaluation: pt evaluated for OT. pt admitted from home where he lives alone and uses a rollator at home. pt admitted w exacerbation of COPD. pt wears O2 at night. pt this date on O2 at 93% and then off O2 to walk and use BR and O2 91%. pt reports being independent PTA w all ADLs and tsf. pt completed bed mobility w SBA. sat EOB SBA. stood w CGA/min A then second time stood w no A. pt completed walking and toilet tsf w SBA and rollator. pt completed LBD SBA w shoes. pt completed grooming SBA. pt appears close to baseline w OT. pt plans to return to Independent living and dc orders in chart during OT session.

## 2023-05-11 NOTE — THERAPY EVALUATION
Patient Name: Tony Leonard  : 1940    MRN: [id number]                              Today's Date: 2023       Admit Date: 5/10/2023    Visit Dx:     ICD-10-CM ICD-9-CM   1. Acute exacerbation of chronic obstructive pulmonary disease (COPD)  J44.1 491.21   2. Hypoxia  R09.02 799.02   3. Other hypervolemia  E87.79 276.69   4. Primary hypertension  I10 401.9   5. Hospital discharge follow-up  Z09 V67.59   6. Chronic diastolic congestive heart failure  I50.32 428.32     428.0   7. Obesity with alveolar hypoventilation and serious comorbidity, unspecified classification  E66.2 278.03   8. Saddle embolus of pulmonary artery, unspecified chronicity, unspecified whether acute cor pulmonale present  I26.92 415.13   9. Abdominal aortic aneurysm (AAA) without rupture, unspecified part  I71.40 441.4     Patient Active Problem List   Diagnosis   • Thrush, oral   • ADD (attention deficit disorder)   • Hemorrhoids   • Bronchiectasis with acute lower respiratory infection   • Diverticul disease small and large intestine, no perforati or abscess   • Benign non-nodular prostatic hyperplasia without lower urinary tract symptoms   • Gastroesophageal reflux disease   • Malaise and fatigue   • Environmental allergies   • Hemoptysis   • Dyslipidemia   • Primary osteoarthritis involving multiple joints   • Pneumonia of right lower lobe due to infectious organism   • Abnormal EKG   • COPD (chronic obstructive pulmonary disease)   • Mild malnutrition   • Acute on chronic respiratory failure with hypoxia (HCC)   • Fracture, intertrochanteric, right femur, closed, initial encounter   • Chronic diastolic CHF (congestive heart failure) ([facility])   • Hematoma of frontal scalp   • Postoperative anemia due to acute blood loss   • Urinary retention   • Hemorrhagic disorder due to circulating anticoagulants   • History of fracture of right hip   • Closed fracture of right hip with routine healing   • Chronic low back pain with sciatica   •  Change in bowel function   • Rectal bleeding   • Prostate cancer (HCC)   • On home oxygen therapy   • Acute viral bronchitis   • Peripheral neuropathy   • Plantar fasciitis   • HTN (hypertension)   • COPD exacerbation   • Bilateral lower extremity edema   • Microscopic hematuria   • Acute midline low back pain with right-sided sciatica   • Spinal stenosis, lumbar region with neurogenic claudication   • Compression deformity of vertebra   • Rectal bleed   • Esophagitis   • Angiectasia   • Hiatal hernia   • Overweight (BMI 25.0-29.9)   • Leukocytosis   • Bilateral hip pain   • Elevated troponin level not due myocardial infarction   • Nocturnal hypoxia   • Pneumonia of right upper lobe due to infectious organism   • NSTEMI (non-ST elevated myocardial infarction)   • Chronic respiratory failure with hypoxia   • Paroxysmal atrial fibrillation   • Acute exacerbation of chronic obstructive pulmonary disease (COPD)   • Anemia   • Chronic anticoagulation     Past Medical History:   Diagnosis Date   • ADHD (attention deficit hyperactivity disorder)    • Bronchiectasis    • CHF (congestive heart failure)    • Colon polyp    • COPD (chronic obstructive pulmonary disease)    • Diverticulosis    • Hard of hearing    • On home oxygen therapy     2 LITERS   • Pneumonia    • Prostate cancer 04/22/2019   • Spinal stenosis, lumbar region with neurogenic claudication 08/02/2022     Past Surgical History:   Procedure Laterality Date   • BRONCHOSCOPY N/A 4/30/2016    Procedure: BRONCHOSCOPY with BAL of right lower lobe and left  lower lobe.  ;  Surgeon: Nando Diaz MD;  Location: [facility] ENDOSCOPY;  Service:    • BRONCHOSCOPY N/A 4/28/2017    Procedure: BRONCHOSCOPY;  Surgeon: Katharina Salter MD;  Location: [facility] ENDOSCOPY;  Service:    • BRONCHOSCOPY Bilateral 6/29/2017    Procedure: BRONCHOSCOPY WITH WASHINGS;  Surgeon: Katharina Salter MD;  Location: [facility] ENDOSCOPY;  Service:    • BRONCHOSCOPY N/A 1/22/2018    Procedure:  BRONCHOSCOPY AT BEDSIDE with BAL;  Surgeon: Katharina Salter MD;  Location: [facility] ENDOSCOPY;  Service:    • BRONCHOSCOPY N/A 1/30/2018    Procedure: BRONCHOSCOPY with BAL and washing;  Surgeon: Shreyas Wild MD;  Location: [facility] ENDOSCOPY;  Service:    • BRONCHOSCOPY Bilateral 4/24/2018    Procedure: BRONCHOSCOPY WITH WASHING;  Surgeon: Katharina Salter MD;  Location: [facility] ENDOSCOPY;  Service: Pulmonary   • BRONCHOSCOPY N/A 12/28/2019    Procedure: BRONCHOSCOPY with bilateral washing;  Surgeon: Katharina Salter MD;  Location: [facility] ENDOSCOPY;  Service: Pulmonary   • BRONCHOSCOPY Bilateral 1/4/2023    Procedure: BRONCHOSCOPY with lavage;  Surgeon: Katharina Salter MD;  Location: [facility] ENDOSCOPY;  Service: Pulmonary;  Laterality: Bilateral;  mucus plugging   • CARDIAC CATHETERIZATION N/A 1/29/2023    Procedure: Left Heart Cath;  Surgeon: Johnnie Ang MD;  Location: [facility] CATH INVASIVE LOCATION;  Service: Cardiology;  Laterality: N/A;   • CARDIAC CATHETERIZATION N/A 1/29/2023    Procedure: Coronary angiography;  Surgeon: Johnnie Ang MD;  Location: [facility] CATH INVASIVE LOCATION;  Service: Cardiology;  Laterality: N/A;   • COLONOSCOPY  05/17/2013    eh, ih, tort, sig tics   • COLONOSCOPY N/A 5/10/2019    Non-thrombosed external hemorrhoids found on perianal exam, Diverticulosis, Tortuous colon, One 5 mm polyp in the mid ascending colon, IH. Path: Tubular adenoma.    • COLONOSCOPY N/A 9/24/2022    Procedure: COLONOSCOPY to cecum and TI with argon plasma coagulation.;  Surgeon: Bruce Black MD;  Location: [facility] ENDOSCOPY;  Service: Gastroenterology;  Laterality: N/A;  pre- GI bleeding  post- radiation proctitis, diverticulosis   • ENDOSCOPY  03/19/2015    z line irreg, hh   • ENDOSCOPY N/A 9/24/2022    Procedure: ESOPHAGOGASTRODUODENOSCOPY;  Surgeon: Bruce Black MD;  Location: [facility] ENDOSCOPY;  Service: Gastroenterology;  Laterality: N/A;  pre- GI bleeding  post- esophagitis, hiatal hernia    • HIP OPEN REDUCTION Right 1/17/2018    Procedure: HIP OPEN REDUCTION INTERNAL FIXATION WITH DYNAMIC HIP SCREW;  Surgeon: Les Black MD;  Location: [facility] OR;  Service:    • SPINE SURGERY     • TONSILLECTOMY     • TOTAL HIP ARTHROPLASTY REVISION Right 9/23/2019    Procedure: HIP  REVISION RIGHT;  Surgeon: Isauro Warner MD;  Location: [facility] MAIN OR;  Service: Orthopedics      General Information     Row Name 05/11/23 [facility]5          Physical Therapy Time and Intention    Document Type evaluation  -MG     Mode of Treatment individual therapy;physical therapy  -MG     Row Name 05/11/23 [facility]5          General Information    Patient Profile Reviewed yes  -MG     Prior Level of Function independent:  Uses a rollator. Owns a STC and power WC  -MG     Existing Precautions/Restrictions fall;oxygen therapy device and L/min  Currently on and typically on 2L O2 via NC.  -MG     Barriers to Rehab none identified  -MG     Row Name 05/11/23 1115          Living Environment    People in Home alone;facility resident  Independent living at [facility].  -MG     Row Name 05/11/23 [facility]5          Home Main Entrance    Number of Stairs, Main Entrance one  States one from driveway to the porch and then uses the elevator.  -MG     Stair Railings, Main Entrance none  -MG     Row Name 05/11/23 1115          Stairs Within Home, Primary    Number of Stairs, Within Home, Primary none  -MG     Row Name 05/11/23 1115          Cognition    Orientation Status (Cognition) oriented x 3  -MG     Row Name 05/11/23 1115          Safety Issues, Functional Mobility    Impairments Affecting Function (Mobility) shortness of breath;endurance/activity tolerance;strength  -MG     Comment, Safety Issues/Impairments (Mobility) Gait belt donned. Wearing shoes.  -MG           User Key  (r) = Recorded By, (t) = Taken By, (c) = Cosigned By    Initials Name Provider Type    MG Pura Sawyer PT Physical Therapist               Mobility     Row  Name 05/11/23 1310          Bed Mobility    Bed Mobility sit-supine  -MG     Sit-Supine [place] (Bed Mobility) standby assist  -MG     Assistive Device (Bed Mobility) head of bed elevated  -MG     Row Name 05/11/23 1310          Sit-Stand Transfer    Sit-Stand [place] (Transfers) standby assist  -MG     Assistive Device (Sit-Stand Transfers) walker, 4-wheeled  -MG     Row Name 05/11/23 1310          Gait/Stairs (Locomotion)    [place] Level (Gait) contact guard;standby assist  -MG     Assistive Device (Gait) walker, 4-wheeled  -MG     Distance in Feet (Gait) 160  -MG     Deviations/Abnormal Patterns (Gait) norm decreased;stride length decreased;weight shifting decreased;gait speed decreased  -MG     Bilateral Gait Deviations forward flexed posture  -MG     Left Sided Gait Deviations decreased knee extension  -MG     Right Sided Gait Deviations decreased knee extension  -MG     Comment, (Gait/Stairs) On PT arrival pt leaving bathroom w/ OT ambulating w/ SBA to sink where PT took over. After washing his hands pt ambulated back to sitting EOB where subjetive questioning was done. After several minutes rest pt stood and ambulated into the hallway and around ns station on room air. Pt heavily flexed fwd holding onto front of rollator due to stiffness/painful B hips. Pt able to progress to holding onto handles/ after ~20' and beginning to loosen up. Pt denied any SOB until he was within 15' of his bed. Satting 93% once resting EOB. No overt LOB noted.  -MG           User Key  (r) = Recorded By, (t) = Taken By, (c) = Cosigned By    Initials Name Provider Type    MG Pura Sawyer, PT Physical Therapist               Obj/Interventions     Row Name 05/11/23 1316          Range of Motion Comprehensive    General Range of Motion bilateral lower extremity ROM WFL  -MG     Row Name 05/11/23 1316          Strength Comprehensive (MMT)    General Manual Muscle Testing (MMT) Assessment lower extremity  "strength deficits identified  -MG     Comment, General Manual Muscle Testing (MMT) Assessment Grossly 4/5 - generalized weakness  -MG     Row Name 05/11/23 1316          Balance    Comment, Balance Sitting - SBA/I while EOB. Standing - SBA w/ rollator. No overt LOB noted.  -MG     Row Name 05/11/23 1316          Sensory Assessment (Somatosensory)    Sensory Assessment (Somatosensory) LE sensation intact  States having N/T \"a litle bit\"  -MG           User Key  (r) = Recorded By, (t) = Taken By, (c) = Cosigned By    Initials Name Provider Type    MG Pura Sawyer, PT Physical Therapist               Goals/Plan     Row Name 05/11/23 1324          Bed Mobility Goal 1 (PT)    Activity/Assistive Device (Bed Mobility Goal 1, PT) bed mobility activities, all  -MG     [place] Level/Cues Needed (Bed Mobility Goal 1, PT) independent  -MG     Time Frame (Bed Mobility Goal 1, PT) 1 week  -MG     Row Name 05/11/23 1324          Transfer Goal 1 (PT)    Activity/Assistive Device (Transfer Goal 1, PT) transfers, all  -MG     [place] Level/Cues Needed (Transfer Goal 1, PT) modified independence  -MG     Time Frame (Transfer Goal 1, PT) 1 week  -MG     Row Name 05/11/23 1324          Gait Training Goal 1 (PT)    Activity/Assistive Device (Gait Training Goal 1, PT) gait (walking locomotion)  -MG     [place] Level (Gait Training Goal 1, PT) modified independence  -MG     Distance (Gait Training Goal 1, PT) 150  -MG     Time Frame (Gait Training Goal 1, PT) 1 week  -MG     [facility] Name 05/11/23 1324          Therapy Assessment/Plan (PT)    Planned Therapy Interventions (PT) balance training;bed mobility training;gait training;home exercise program;patient/family education;stretching;strengthening;stair training;neuromuscular re-education;ROM (range of motion);transfer training  -MG           User Key  (r) = Recorded By, (t) = Taken By, (c) = Cosigned By    Initials Name Provider Type    MG Pura Sawyer, PT Physical " Therapist               Clinical Impression     Row Name 05/11/23 1317          Pain    Pretreatment Pain Rating 4/10  -MG     Posttreatment Pain Rating 4/10  -MG     Pain Location - Side/Orientation Bilateral  -MG     Pain Location - hip  -MG     Pain Intervention(s) Medication (See MAR);Ambulation/increased activity;Repositioned;Rest  -MG     Row Name 05/11/23 1317          Plan of Care Review    Plan of Care Reviewed With patient  -MG     Progress improving  -MG     Outcome Evaluation Pt is a 83 y/o M who presented to ED with c/o worsening SOB and BLE edema. Pt wheezing and satting 90% on room air while in ED. Pt reports living in an independent living facility where he uses a rollator to ambulate, denies requiring any assistance with mobility or ADLs. Pt is currently SBA for all mobility and ambulation of 160' while on room air. Pt was SOB on return to room, however pt satting 93%. No overt LOB noted during mobility. Pt denied any dizziness, but did expierence some fatigue and B hip pain. Pt reports feeling ready to go back home and has no concerns, anticipating DC later today or possibly tomorrow. Pt presents with decreased strength and decreased endurance. Pt will benefit from skilled PT services during this admission to progress his functional mobility and decrease falls risk. Rec home w/ HH PT following to progress deficits listed above.  -MG     Row Name 05/11/23 1317          Therapy Assessment/Plan (PT)    Rehab Potential (PT) good, to achieve stated therapy goals  -MG     Criteria for Skilled Interventions Met (PT) yes  -MG     Therapy Frequency (PT) 5 times/wk  -MG     Row Name 05/11/23 1310          Vital Signs    Pretreatment Heart Rate (beats/min) 104  -MG     Pre SpO2 (%) 94  -MG     O2 Delivery Pre Treatment room air  -MG     O2 Delivery Intra Treatment room air  -MG     Post SpO2 (%) 93  -MG     O2 Delivery Post Treatment room air  -MG     Pre Patient Position Standing  -MG     Intra Patient  Position Standing  -MG     Post Patient Position Supine  -MG     Row Name 05/11/23 1317          Positioning and Restraints    Pre-Treatment Position standing in room  Exiting bathroom w/ OT  -MG     Post Treatment Position bed  -MG     In Bed notified nsg;supine;fowlers;call light within reach;encouraged to call for assist;exit alarm on  -MG           User Key  (r) = Recorded By, (t) = Taken By, (c) = Cosigned By    Initials Name Provider Type     Pura Sawyer, PT Physical Therapist               Outcome Measures     Row Name 05/11/23 1324 05/11/23 0840       How much help from another person do you currently need...    Turning from your back to your side while in flat bed without using bedrails? 4  -MG 3  -DS    Moving from lying on back to sitting on the side of a flat bed without bedrails? 3  -MG 3  -DS    Moving to and from a bed to a chair (including a wheelchair)? 3  -MG 3  -DS    Standing up from a chair using your arms (e.g., wheelchair, bedside chair)? 3  -MG 3  -DS    Climbing 3-5 steps with a railing? 2  -MG 2  -DS    To walk in hospital room? 3  -MG 3  -DS    AM-PAC 6 Clicks Score (PT) 18  -MG 17  -DS    Highest level of mobility 6 --> Walked 10 steps or more  -MG 5 --> Static standing  -DS          User Key  (r) = Recorded By, (t) = Taken By, (c) = Cosigned By    Initials Name Provider Type    Pura Spivey, PT Physical Therapist    Evelyn Benites RN Registered Nurse                             Physical Therapy Education     Title: PT OT SLP Therapies (In Progress)     Topic: Physical Therapy (In Progress)     Point: Mobility training (Done)     Learning Progress Summary           Patient Acceptance, E, VU,DU,NR by  at 5/11/2023 1325                   Point: Home exercise program (Not Started)     Learner Progress:  Not documented in this visit.          Point: Body mechanics (Done)     Learning Progress Summary           Patient Acceptance, E, VU,DU,NR by  at 5/11/2023 1325                    Point: Precautions (Done)     Learning Progress Summary           Patient Acceptance, E, VU,DU,NR by  at 5/11/2023 1325                               User Key     Initials Effective Dates Name Provider Type Discipline     05/24/22 -  Pura Sawyer, PT Physical Therapist PT              PT Recommendation and Plan  Planned Therapy Interventions (PT): balance training, bed mobility training, gait training, home exercise program, patient/family education, stretching, strengthening, stair training, neuromuscular re-education, ROM (range of motion), transfer training  Plan of Care Reviewed With: patient  Progress: improving  Outcome Evaluation: Pt is a 81 y/o M who presented to ED with c/o worsening SOB and BLE edema. Pt wheezing and satting 90% on room air while in ED. Pt reports living in an independent living facility where he uses a rollator to ambulate, denies requiring any assistance with mobility or ADLs. Pt is currently SBA for all mobility and ambulation of 160' while on room air. Pt was SOB on return to room, however pt satting 93%. No overt LOB noted during mobility. Pt denied any dizziness, but did expierence some fatigue and B hip pain. Pt reports feeling ready to go back home and has no concerns, anticipating DC later today or possibly tomorrow. Pt presents with decreased strength and decreased endurance. Pt will benefit from skilled PT services during this admission to progress his functional mobility and decrease falls risk. Rec home w/ HH PT following to progress deficits listed above.     Time Calculation:    PT Charges     Row Name 05/11/23 1326             Time Calculation    Start Time 1110  -MG      Stop Time 1130  -MG      Time Calculation (min) 20 min  -MG      PT Received On 05/11/23  -MG      PT - Next Appointment 05/12/23  -MG      PT Goal Re-Cert Due Date 05/18/23  -MG         Time Calculation- PT    Total Timed Code Minutes- PT 15 minute(s)  -MG            User Key  (r) = Recorded  By, (t) = Taken By, (c) = Cosigned By    Initials Name Provider Type    MG Pura Sawyer, PT Physical Therapist              Therapy Charges for Today     Code Description Service Date Service Provider Modifiers Qty    50872751620 HC PT EVAL MOD COMPLEXITY 3 5/11/2023 Pura Sawyer, PT GP 1    67868862405 HC PT THERAPEUTIC ACT EA 15 MIN 5/11/2023 Pura Sawyer, PT GP 1          PT G-Codes  AM-PAC 6 Clicks Score (PT): 18  PT Discharge Summary  Anticipated Discharge Disposition (PT): home with home health    Pura Sawyer, PT  5/11/2023

## 2023-05-11 NOTE — PLAN OF CARE
Goal Outcome Evaluation:  Plan of Care Reviewed With: patient        Progress: improving  Outcome Evaluation: Pt Alert and pleasant. Appetite good. Assist with rollator. Voids per urinal. BM in bathroom. [place]. Discharge instructions and education sheets provided.

## 2023-05-11 NOTE — THERAPY DISCHARGE NOTE
Acute Care - Occupational Therapy Discharge  [facility]    Patient Name: Tony Leonard  : 1940    MRN: [id number]                              Today's Date: 2023       Admit Date: 5/10/2023    Visit Dx:     ICD-10-CM ICD-9-CM   1. Acute exacerbation of chronic obstructive pulmonary disease (COPD)  J44.1 491.21   2. Hypoxia  R09.02 799.02   3. Other hypervolemia  E87.79 276.69   4. Primary hypertension  I10 401.9   5. Hospital discharge follow-up  Z09 V67.59   6. Chronic diastolic congestive heart failure  I50.32 428.32     428.0   7. Obesity with alveolar hypoventilation and serious comorbidity, unspecified classification  E66.2 278.03   8. Saddle embolus of pulmonary artery, unspecified chronicity, unspecified whether acute cor pulmonale present  I26.92 415.13   9. Abdominal aortic aneurysm (AAA) without rupture, unspecified part  I71.40 441.4     Patient Active Problem List   Diagnosis   • Thrush, oral   • ADD (attention deficit disorder)   • Hemorrhoids   • Bronchiectasis with acute lower respiratory infection   • Diverticul disease small and large intestine, no perforati or abscess   • Benign non-nodular prostatic hyperplasia without lower urinary tract symptoms   • Gastroesophageal reflux disease   • Malaise and fatigue   • Environmental allergies   • Hemoptysis   • Dyslipidemia   • Primary osteoarthritis involving multiple joints   • Pneumonia of right lower lobe due to infectious organism   • Abnormal EKG   • COPD (chronic obstructive pulmonary disease)   • Mild malnutrition   • Acute on chronic respiratory failure with hypoxia (HCC)   • Fracture, intertrochanteric, right femur, closed, initial encounter   • Chronic diastolic CHF (congestive heart failure) ([facility])   • Hematoma of frontal scalp   • Postoperative anemia due to acute blood loss   • Urinary retention   • Hemorrhagic disorder due to circulating anticoagulants   • History of fracture of right hip   • Closed fracture of right hip  with routine healing   • Chronic low back pain with sciatica   • Change in bowel function   • Rectal bleeding   • Prostate cancer (HCC)   • On home oxygen therapy   • Acute viral bronchitis   • Peripheral neuropathy   • Plantar fasciitis   • HTN (hypertension)   • COPD exacerbation   • Bilateral lower extremity edema   • Microscopic hematuria   • Acute midline low back pain with right-sided sciatica   • Spinal stenosis, lumbar region with neurogenic claudication   • Compression deformity of vertebra   • Rectal bleed   • Esophagitis   • Angiectasia   • Hiatal hernia   • Overweight (BMI 25.0-29.9)   • Leukocytosis   • Bilateral hip pain   • Elevated troponin level not due myocardial infarction   • Nocturnal hypoxia   • Pneumonia of right upper lobe due to infectious organism   • NSTEMI (non-ST elevated myocardial infarction)   • Chronic respiratory failure with hypoxia   • Paroxysmal atrial fibrillation   • Acute exacerbation of chronic obstructive pulmonary disease (COPD)   • Anemia   • Chronic anticoagulation     Past Medical History:   Diagnosis Date   • ADHD (attention deficit hyperactivity disorder)    • Bronchiectasis    • CHF (congestive heart failure)    • Colon polyp    • COPD (chronic obstructive pulmonary disease)    • Diverticulosis    • Hard of hearing    • On home oxygen therapy     2 LITERS   • Pneumonia    • Prostate cancer 04/22/2019   • Spinal stenosis, lumbar region with neurogenic claudication 08/02/2022     Past Surgical History:   Procedure Laterality Date   • BRONCHOSCOPY N/A 4/30/2016    Procedure: BRONCHOSCOPY with BAL of right lower lobe and left  lower lobe.  ;  Surgeon: Nando Diaz MD;  Location: [facility] ENDOSCOPY;  Service:    • BRONCHOSCOPY N/A 4/28/2017    Procedure: BRONCHOSCOPY;  Surgeon: Katharina Salter MD;  Location: [facility] ENDOSCOPY;  Service:    • BRONCHOSCOPY Bilateral 6/29/2017    Procedure: BRONCHOSCOPY WITH WASHINGS;  Surgeon: Katharina Salter MD;  Location: [facility]  ENDOSCOPY;  Service:    • BRONCHOSCOPY N/A 1/22/2018    Procedure: BRONCHOSCOPY AT BEDSIDE with BAL;  Surgeon: Katharina Salter MD;  Location: [facility] ENDOSCOPY;  Service:    • BRONCHOSCOPY N/A 1/30/2018    Procedure: BRONCHOSCOPY with BAL and washing;  Surgeon: Shreyas Wild MD;  [facility];  Service:    • BRONCHOSCOPY Bilateral 4/24/2018    Procedure: BRONCHOSCOPY WITH WASHING;  Surgeon: Katharina Salter MD;  Location: [facility] ENDOSCOPY;  Service: Pulmonary   • BRONCHOSCOPY N/A 12/28/2019    Procedure: BRONCHOSCOPY with bilateral washing;  Surgeon: Katharina Salter MD;  [facility];  Service: Pulmonary   • BRONCHOSCOPY Bilateral 1/4/2023    Procedure: BRONCHOSCOPY with lavage;  Surgeon: Katharina Salter MD;  Location: [facility] ENDOSCOPY;  Service: Pulmonary;  Laterality: Bilateral;  mucus plugging   • CARDIAC CATHETERIZATION N/A 1/29/2023    Procedure: Left Heart Cath;  Surgeon: Johnnie Ang MD;  Location: [facility] CATH INVASIVE LOCATION;  Service: Cardiology;  Laterality: N/A;   • CARDIAC CATHETERIZATION N/A 1/29/2023    Procedure: Coronary angiography;  Surgeon: Johnnie Ang MD;  Location: [facility] CATH INVASIVE LOCATION;  Service: Cardiology;  Laterality: N/A;   • COLONOSCOPY  05/17/2013    eh, ih, tort, sig tics   • COLONOSCOPY N/A 5/10/2019    Non-thrombosed external hemorrhoids found on perianal exam, Diverticulosis, Tortuous colon, One 5 mm polyp in the mid ascending colon, IH. Path: Tubular adenoma.    • COLONOSCOPY N/A 9/24/2022    Procedure: COLONOSCOPY to cecum and TI with argon plasma coagulation.;  Surgeon: Bruce Black MD;  Location: [facility] ENDOSCOPY;  Service: Gastroenterology;  Laterality: N/A;  pre- GI bleeding  post- radiation proctitis, diverticulosis   • ENDOSCOPY  03/19/2015    z line irreg, hh   • ENDOSCOPY N/A 9/24/2022    Procedure: ESOPHAGOGASTRODUODENOSCOPY;  Surgeon: Bruce Black MD;  Location: [facility] ENDOSCOPY;  Service: Gastroenterology;   Laterality: N/A;  pre- GI bleeding  post- esophagitis, hiatal hernia   • HIP OPEN REDUCTION Right 1/17/2018    Procedure: HIP OPEN REDUCTION INTERNAL FIXATION WITH DYNAMIC HIP SCREW;  Surgeon: Les Black MD;  Location: [facility];  Service:    • SPINE SURGERY     • TONSILLECTOMY     • TOTAL HIP ARTHROPLASTY REVISION Right 9/23/2019    Procedure: HIP  REVISION RIGHT;  Surgeon: Isauro Warner MD;  Location: [facility] OR;  Service: Orthopedics      General Information     Row Name 05/11/23 [facility]7          OT Time and Intention    Document Type evaluation  -     Mode of Treatment individual therapy;occupational therapy  -     Row Name 05/11/23 [facility]7          General Information    Patient Profile Reviewed yes  -     Prior Level of Function independent:;bed mobility;ADL's;all household mobility;gait;transfer  uses a rollator and O2 at night  -     Existing Precautions/Restrictions oxygen therapy device and L/min;fall  -     Barriers to Rehab none identified  -     Row Name 05/11/23 1317          Living Environment    People in Home alone  -[facility] Name 05/11/23 1317          Cognition    Orientation Status (Cognition) oriented x 3  -[facility] Name 05/11/23 1317          Safety Issues, Functional Mobility    Impairments Affecting Function (Mobility) endurance/activity tolerance;shortness of breath;strength  -           User Key  (r) = Recorded By, (t) = Taken By, (c) = Cosigned By    Initials Name Provider Type     Yamilet De Paz, OTR Occupational Therapist               Mobility/ADL's     Row Name 05/11/23 1318          Bed Mobility    Sit-Supine [place] (Bed Mobility) standby assist;set up  -     Assistive Device (Bed Mobility) bed rails;head of bed elevated  -     Row Name 05/11/23 1318          Transfers    Transfers sit-stand transfer;stand-sit transfer;toilet transfer  -     Row Name 05/11/23 1318          Sit-Stand Transfer    Sit-Stand [place] (Transfers) set  up;standby assist;contact guard  -     Assistive Device (Sit-Stand Transfers) walker, 4-wheeled  -     Comment, (Sit-Stand Transfer) rollator. CGA/min first time then SBA second stand from EOB  [facility] Name 05/11/23 1318          Stand-Sit Transfer    Stand-Sit [place] (Transfers) set up;standby assist  -     Assistive Device (Stand-Sit Transfers) walker, 4-wheeled  -     Comment, (Stand-Sit Transfer) rollator  -KP     Row Name 05/11/23 1318          Toilet Transfer    Type (Toilet Transfer) sit-stand;stand-sit  -     [place] Level (Toilet Transfer) set up;standby assist  -     Assistive Device (Toilet Transfer) walker, front-wheeled;grab bars/safety frame  -KP     Row Name 05/11/23 1318          Functional Mobility    Functional Mobility- Ind. Level set up required;supervision required  -     Functional Mobility- Device walker, front-wheeled  -     Functional Mobility- Comment pt walked in room, down saavedra, around nsg unit and to bed, the needed to use the BR  -KP     Row Name 05/11/23 1318          Activities of Daily Living    BADL Assessment/Intervention grooming;toileting;lower body dressing  -KP     Row Name 05/11/23 1318          Grooming Assessment/Training    [place] Level (Grooming) grooming skills;wash face, hands;standby assist;set up  -KP     Row Name 05/11/23 1318          Toileting Assessment/Training    [place] (Toileting) toileting skills;perform perineal hygiene;adjust/manage clothing;standby assist  -     Position (Toileting) supported sitting;supported standing  -KP     Row Name 05/11/23 1318          Lower Body Dressing Assessment/Training    [place] (Lower Body Dressing) lower body dressing skills;don;standby assist;shoes/slippers  -     Position (Lower Body Dressing) edge of bed sitting  -           User Key  (r) = Recorded By, (t) = Taken By, (c) = Cosigned By    Initials Name Provider Type    Yamilet Mccray, JEREMI  Occupational Therapist               Obj/Interventions     Row Name 05/11/23 1327          Sensory Assessment (Somatosensory)    Sensory Assessment (Somatosensory) UE sensation intact  -     Row Name 05/11/23 1327          Vision Assessment/Intervention    Visual Impairment/Limitations L  -[facility] Name 05/11/23 1327          Range of Motion Comprehensive    General Range of Motion bilateral upper extremity ROM WNL  -     Comment, General Range of Motion B UE L  -[facility] Name 05/11/23 1327          Strength Comprehensive (MMT)    Comment, General Manual Muscle Testing (MMT) Assessment B UE 4/5  -[facility] Name 05/11/23 1327          Shoulder (Therapeutic Exercise)    Shoulder (Therapeutic Exercise) AROM (active range of motion)  -     Shoulder AROM (Therapeutic Exercise) left;right;flexion;extension;scapular protraction;scapular retraction;10 repetitions;sitting  -[facility] Name 05/11/23 1327          Elbow/Forearm (Therapeutic Exercise)    Elbow/Forearm (Therapeutic Exercise) AROM (active range of motion)  -     Elbow/Forearm AROM (Therapeutic Exercise) left;right;flexion;extension;10 repetitions;sitting  -[facility] Name 05/11/23 1327          Motor Skills    Motor Skills coordination;functional endurance  -     Coordination [facility]     Functional Endurance good  -     Therapeutic Exercise elbow/forearm;shoulder  -[facility] Name 05/11/23 1327          Balance    Balance Assessment sitting static balance;standing static balance;sitting dynamic balance;standing dynamic balance  -     Static Sitting Balance standby assist  -     Dynamic Sitting Balance standby assist  -     Position, Sitting Balance sitting edge of bed  -     Static Standing Balance standby assist  -     Dynamic Standing Balance standby assist  -     Position/Device Used, Standing Balance supported;walker, 4-wheeled  -     Balance Interventions sitting;standing;sit to stand;supported;static;dynamic;occupation  based/functional task  -           User Key  (r) = Recorded By, (t) = Taken By, (c) = Cosigned By    Initials Name Provider Type    Yamilet Mccray OTR Occupational Therapist               Goals/Plan     Row Name 05/11/23 1334          Transfer Goal 1 (OT)    Activity/Assistive Device (Transfer Goal 1, OT) sit-to-stand/stand-to-sit;toilet  -     [place] Level/Cues Needed (Transfer Goal 1, OT) standby assist  -KP     Time Frame (Transfer Goal 1, OT) short term goal (STG);1 day  -     Progress/Outcome (Transfer Goal 1, OT) goal met  -           User Key  (r) = Recorded By, (t) = Taken By, (c) = Cosigned By    Initials Name Provider Type    Yamilet Mccray OTR Occupational Therapist               Clinical Impression     Row Name 05/11/23 8859          Pain Assessment    Pretreatment Pain Rating 4/10  -     Posttreatment Pain Rating 4/10  -     Pain Location - Side/Orientation Right  -     Pain Location - hip  -     Pain Intervention(s) Repositioned  -     Row Name 05/11/23 1333          Plan of Care Review    Plan of Care Reviewed With patient  -     Progress improving  -     Outcome Evaluation pt evaluated for OT. pt admitted from home where he lives alone and uses a rollator at home. pt admitted w exacerbation of COPD. pt wears O2 at night. pt this date on O2 at 93% and then off O2 to walk and use BR and O2 91%. pt reports being independent PTA w all ADLs and tsf. pt completed bed mobility w SBA. sat EOB SBA. stood w CGA/min A then second time stood w no A. pt completed walking and toilet tsf w SBA and rollator. pt completed LBD SBA w shoes. pt completed grooming SBA. pt appears close to baseline w OT. pt plans to return to Independent living and dc orders in chart during OT session.  -     Row Name 05/11/23 1734          Therapy Assessment/Plan (OT)    Criteria for Skilled Therapeutic Interventions Met (OT) no problems identified which require skilled intervention   -     Therapy Frequency (OT) evaluation only  -     Row Name 05/11/23 1332          Therapy Plan Review/Discharge Plan (OT)    Anticipated Discharge Disposition (OT) other (see comments)  independent living facility  -     Row Name 05/11/23 1332          Vital Signs    O2 Delivery Pre Treatment supplemental O2  -KP     O2 Delivery Intra Treatment supplemental O2  -KP     O2 Delivery Post Treatment supplemental O2  -KP     Pre Patient Position Supine  -KP     Intra Patient Position Sitting  -KP     Post Patient Position Sitting  -KP     Row Name 05/11/23 1332          Positioning and Restraints    Pre-Treatment Position in bed  -KP     Post Treatment Position bed  -KP     In Bed call light within reach;encouraged to call for assist;sitting EOB;with PT  -KP           User Key  (r) = Recorded By, (t) = Taken By, (c) = Cosigned By    Initials Name Provider Type     Yamilet De Paz, OTR Occupational Therapist               Outcome Measures     Row Name 05/11/23 1339          How much help from another is currently needed...    Putting on and taking off regular lower body clothing? 4  -KP     Bathing (including washing, rinsing, and drying) 4  -KP     Toileting (which includes using toilet bed pan or urinal) 4  -KP     Putting on and taking off regular upper body clothing 4  -KP     Taking care of personal grooming (such as brushing teeth) 4  -KP     Eating meals 4  -KP     AM-PAC 6 Clicks Score (OT) 24  -     Row Name 05/11/23 1324 05/11/23 0840       How much help from another person do you currently need...    Turning from your back to your side while in flat bed without using bedrails? 4  -MG 3  -DS    Moving from lying on back to sitting on the side of a flat bed without bedrails? 3  -MG 3  -DS    Moving to and from a bed to a chair (including a wheelchair)? 3  -MG 3  -DS    Standing up from a chair using your arms (e.g., wheelchair, bedside chair)? 3  -MG 3  -DS    Climbing 3-5 steps with a railing?  2  -MG 2  -DS    To walk in hospital room? 3  -MG 3  -DS    AM-PAC 6 Clicks Score (PT) 18  -MG 17  -DS    Highest level of mobility 6 --> Walked 10 steps or more  -MG 5 --> Static standing  -DS    Row Name 05/11/23 1339          Functional Assessment    Outcome Measure Options AM-PAC 6 Clicks Daily Activity (OT)  -           User Key  (r) = Recorded By, (t) = Taken By, (c) = Cosigned By    Initials Name Provider Type    Yamilet Mccray, OTR Occupational Therapist    MG Pura Sawyer, PT Physical Therapist    DS Evelyn Huaser, RN Registered Nurse              Occupational Therapy Education     Title: PT OT SLP Therapies (In Progress)     Topic: Occupational Therapy (Resolved)     Point: ADL training (Resolved)     Description:   Instruct learner(s) on proper safety adaptation and remediation techniques during self care or transfers.   Instruct in proper use of assistive devices.              Learning Progress Summary           Patient Acceptance, E,TB, VU,DU by  at 5/11/2023 1340    Comment: ed pt on role of OT. benefit of therapy, ed on safety w ADL and tsf. pt demo toilet tsf SBA, LBD shoes SBA sitting EOB, toileting skills and grooming SBA.                   Point: Home exercise program (Resolved)     Description:   Instruct learner(s) on appropriate technique for monitoring, assisting and/or progressing therapeutic exercises/activities.              Learning Progress Summary           Patient Acceptance, E,TB, VU,DU by  at 5/11/2023 1340    Comment: ed pt on role of OT. benefit of therapy, ed on safety w ADL and tsf. pt demo toilet tsf SBA, LBD shoes SBA sitting EOB, toileting skills and grooming SBA.                   Point: Precautions (Resolved)     Description:   Instruct learner(s) on prescribed precautions during self-care and functional transfers.              Learning Progress Summary           Patient Acceptance, E,TB, VU,DU by  at 5/11/2023 1340    Comment: ed pt on role of OT.  benefit of therapy, ed on safety w ADL and tsf. pt demo toilet tsf SBA, LBD shoes SBA sitting EOB, toileting skills and grooming SBA.                   Point: Body mechanics (Resolved)     Description:   Instruct learner(s) on proper positioning and spine alignment during self-care, functional mobility activities and/or exercises.              Learning Progress Summary           Patient Acceptance, E,TB, VU,DU by  at 5/11/2023 1340    Comment: ed pt on role of OT. benefit of therapy, ed on safety w ADL and tsf. pt demo toilet tsf SBA, LBD shoes SBA sitting EOB, toileting skills and grooming SBA.                               User Key     Initials Effective Dates Name Provider Type Discipline     06/16/21 -  Yamilet De Paz, OTR Occupational Therapist OT              OT Recommendation and Plan  Therapy Frequency (OT): evaluation only  Plan of Care Review  Plan of Care Reviewed With: patient  Progress: improving  Outcome Evaluation: pt evaluated for OT. pt admitted from home where he lives alone and uses a rollator at home. pt admitted w exacerbation of COPD. pt wears O2 at night. pt this date on O2 at 93% and then off O2 to walk and use BR and O2 91%. pt reports being independent PTA w all ADLs and tsf. pt completed bed mobility w SBA. sat EOB SBA. stood w CGA/min A then second time stood w no A. pt completed walking and toilet tsf w SBA and rollator. pt completed LBD SBA w shoes. pt completed grooming SBA. pt appears close to baseline w OT. pt plans to return to Independent living and dc orders in chart during OT session.  Plan of Care Reviewed With: patient  Outcome Evaluation: pt evaluated for OT. pt admitted from home where he lives alone and uses a rollator at home. pt admitted w exacerbation of COPD. pt wears O2 at night. pt this date on O2 at 93% and then off O2 to walk and use BR and O2 91%. pt reports being independent PTA w all ADLs and tsf. pt completed bed mobility w SBA. sat EOB SBA. stood w  CGA/min A then second time stood w no A. pt completed walking and toilet tsf w SBA and rollator. pt completed LBD SBA w shoes. pt completed grooming SBA. pt appears close to baseline w OT. pt plans to return to Independent living and dc orders in chart during OT session.     Time Calculation:    Time Calculation- OT     Row Name 05/11/23 1342             Time Calculation- OT    OT Start Time 1050  -KP      OT Stop Time 1112  -KP      OT Time Calculation (min) 22 min  -KP      Total Timed Code Minutes- OT 10 minute(s)  -KP      OT Received On 05/11/23  -         Timed Charges    92318 - OT Self Care/Mgmt Minutes 10  -KP         Untimed Charges    OT Eval/Re-eval Minutes 12  -KP         Total Minutes    Timed Charges Total Minutes 10  -KP      Untimed Charges Total Minutes 12  -KP       Total Minutes 22  -KP            User Key  (r) = Recorded By, (t) = Taken By, (c) = Cosigned By    Initials Name Provider Type     Yamilet De Paz OTR Occupational Therapist              Therapy Charges for Today     Code Description Service Date Service Provider Modifiers Qty    [id number]  OT SELF CARE/MGMT/TRAIN EA 15 MIN 5/11/2023 Yamilet De Paz OTR GO 1    [id number]  OT EVAL LOW COMPLEXITY 2 5/11/2023 Yamilet De Paz OTR GO 1             OT Discharge Summary  Anticipated Discharge Disposition (OT): other (see comments) (independent living facility)    JEREMI Tadeo  5/11/2023

## 2023-05-11 NOTE — PLAN OF CARE
Goal Outcome Evaluation:  Plan of Care Reviewed With: patient        Progress: improving  Outcome Evaluation: Pt is a 81 y/o M who presented to ED with c/o worsening SOB and BLE edema. Pt wheezing and satting 90% on room air while in ED. Pt reports living in an independent living facility where he uses a rollator to ambulate, denies requiring any assistance with mobility or ADLs. Pt is currently SBA for all mobility and ambulation of 160' while on room air. Pt was SOB on return to room, however pt satting 93%. No overt LOB noted during mobility. Pt denied any dizziness, but did expierence some fatigue and B hip pain. Pt reports feeling ready to go back home and has no concerns, anticipating DC later today or possibly tomorrow. Pt presents with decreased strength and decreased endurance. Pt will benefit from skilled PT services during this admission to progress his functional mobility and decrease falls risk. Rec home w/ HH PT following to progress deficits listed above.

## 2023-05-11 NOTE — DISCHARGE PLACEMENT REQUEST
"Tony Casey (82 y.o. Male)     Date of Birth   1940    Social Security Number       Address   [address] [place] [address]    Home Phone   [phone]    MRN   [id number]       Roman [employer]   Congregation    Marital Status   Single                            Admission Date   5/10/23    Admission Type   Emergency    Admitting Provider   Olaf Garzon MD    Attending Provider   Olaf Garzon MD    Department, Room/Bed   [address], S619/1       Discharge Date       Discharge Disposition       Discharge Destination                               Attending Provider: Olaf Garzon MD    Allergies: Daliresp [Roflumilast], Latex, Sulfa Antibiotics    Isolation: None   Infection: None   Code Status: CPR    Ht: 180.3 cm (71\")   Wt: 80.3 kg (177 lb)    Admission Cmt: None   Principal Problem: Acute exacerbation of chronic obstructive pulmonary disease (COPD) [J44.1]                 Active Insurance as of 5/10/2023     Primary Coverage     Payor Plan Insurance Group Employer/Plan Group    MEDICARE MEDICARE A & B      Payor Plan Address Payor Plan Phone Number Payor Plan Fax Number Effective Dates    [address] [phone]  5/1/2003 - None Entered    [facility] [id number]       Subscriber Name Subscriber Birth Date Member ID       TONY CASEY 1940 [id number]           Secondary Coverage     Payor Plan Insurance Group Employer/Plan Group     FOR LIFE  FOR LIFE  SUP       Payor Plan Address Payor Plan Phone Number Payor Plan Fax Number Effective Dates    [address] [phone]  3/10/2016 - None Entered    [facility] [address]       Subscriber Name Subscriber Birth Date Member ID       TONY CASEY 1940 [id number]                 Emergency Contacts      (Rel.) Home Phone Work Phone Mobile Phone    VICKY CASEY (Son) [phone] -- [phone]    Frederick Casey (Son) [phone] -- --    Ricardo Casey (Son) " [phone] -- [phone]

## 2023-05-11 NOTE — OUTREACH NOTE
Prep Survey    Flowsheet Row Responses   [facility]? [facility]   Is LACE score < 7 ? No   Eligibility [facility]   Date of Admission 05/10/23   Date of Discharge 05/11/23   Discharge Disposition Home-Health Care Svc   Discharge diagnosis Acute exacerbation of chronic obstructive pulmonary disease, Acute on chronic respiratory failure with hypoxia   Does the patient have one of the following disease processes/diagnoses(primary or secondary)? COPD   Does the patient have Home health ordered? Yes   What is the Home health agency?  [facility]   Is there a DME ordered? No   General alerts for this patient [facility]   Prep survey completed? Yes          Melly GUILLAUME - Registered Nurse

## 2023-05-11 NOTE — CASE MANAGEMENT/SOCIAL WORK
Discharge Planning Assessment  [facility]     Patient Name: Tony Leonard  MRN: [id number]  Today's Date: 5/11/2023    Admit Date: 5/10/2023    Plan: Return home   Discharge Needs Assessment     Row Name 05/11/23 1301       Discharge Needs Assessment    Equipment Currently Used at Home scales;nebulizer    Row Name 05/11/23 1257       Living Environment    People in Home alone    Current Living Arrangements apartment    Primary Care Provided by self    Provides Primary Care For no one    Family Caregiver if Needed child(brian), adult    Family Caregiver Names Son Stef [phone]    Quality of Family Relationships helpful    Able to Return to Prior Arrangements yes       Resource/Environmental Concerns    Resource/Environmental Concerns none    Transportation Concerns none       Food Insecurity    Within the past 12 months, you worried that your food would run out before you got the money to buy more. Never true    Within the past 12 months, the food you bought just didn't last and you didn't have money to get more. Never true       Transition Planning    Patient/Family Anticipates Transition to home    Patient/Family Anticipated Services at Transition none    Transportation Anticipated family or friend will provide       Discharge Needs Assessment    Readmission Within the Last 30 Days no previous admission in last 30 days    Equipment Currently Used at Home cane, straight;rollator;wheelchair, motorized;oxygen;grab bar;shower chair  Life alert    Concerns to be Addressed denies needs/concerns at this time    Anticipated Changes Related to Illness none    Equipment Needed After Discharge none    Discharge Facility/Level of Care Needs home with home health    Provided Post Acute Provider List? N/A    N/A Provider List Comment Has used [facility] in the past and would like to use them again               Discharge Plan     Row Name 05/11/23 1256       Plan    Plan Return home    Patient/Family in Agreement with Plan  yes    Plan Comments Spoke with patient at bedside.  He lives [place] at [facility].  He has a cane, rollator, electric W/C, life alert, O2 from [place], nebulizer, scales.  He has used [facility] in the past and [place] - left message for [facility] and they can accept.  He has been to [facility] in the past for rehab.  PCP is Dr. tami Kate and pharmacy is [facility].  Patient drives and does his own shopping.  He states son Stef [phone] will help if needed.  [place] home [place] with [facility] following.  CCP will follow.  Adán TAVARES              Continued Care and Services - Admitted Since 5/10/2023     Home Medical Care Coordination complete.    Service Provider Request Status Selected Services Address Phone Fax Patient Preferred    [facility] [address], [address], [place] [id number] [phone] --            Selected Continued Care - Episodes Includes continued care and service providers with selected services from the active episodes listed below    High Risk Care Management Episode start date: 12/13/2022   There are no active outsourced providers for this episode.               Expected Discharge Date and Time     Expected Discharge Date Expected Discharge Time    May 11, 2023          Demographic Summary     Row Name 05/11/23 1256       General Information    Admission Type observation    Arrived From home    Referral Source admission list    Reason for Consult discharge planning    Preferred Language English               Functional Status     Row Name 05/11/23 1256       Functional Status    Usual Activity Tolerance moderate    Current Activity Tolerance fair       Functional Status, IADL    Medications independent    Meal Preparation independent    Housekeeping independent    Laundry independent    Shopping independent       Mental Status    General Appearance WDL  X;appearance    General Appearance unshaven       Mental Status Summary    Recent Changes in Mental Status/Cognitive Functioning no changes                      Becky S. [place], RN

## 2023-05-11 NOTE — PLAN OF CARE
Goal Outcome Evaluation:  Plan of Care Reviewed With: patient        Progress: no change  Outcome Evaluation: Patient had some c/o pain in LLE tonight, treated with Tylenol PRN. Night meds given once available from pharmacy. Up with stand-by assist of 1, patient uses home walker to ambulate. Moderate edema in BLE. Room air while awake, 2 L O2 at bedtime which is patient's normal at home. IV SoluMedrol as scheduled. VSS. Daily weight. WCTM.

## 2023-05-11 NOTE — DISCHARGE SUMMARY
Patient Name: Tony Leonard  : 1940  MRN: [id number]    Date of Admission: 5/10/2023  Date of Discharge:  2023  Primary Care Physician: Harpreet Kate MD      Chief Complaint:   Shortness of Breath      Discharge Diagnoses     Active Hospital Problems    Diagnosis  POA   • **Acute exacerbation of chronic obstructive pulmonary disease (COPD) [J44.1]  Yes   • Non-compliant patient [Z91.199]  Not Applicable   • Anemia [D64.9]  Yes   • Chronic anticoagulation [Z79.01]  Not Applicable   • Paroxysmal atrial fibrillation [I48.0]  Yes   • HTN (hypertension) [I10]  Yes   • Chronic diastolic CHF (congestive heart failure) (HCC) [I50.32]  Yes   • Acute on chronic respiratory failure with hypoxia (HCC) [J96.21]  Yes      Resolved Hospital Problems   No resolved problems to display.        Hospital Course     Mr. Leonard is a 82 y.o. male with a history of congestive heart failure, COPD on intermittent use of oxygen at home, [place], prostate cancer who presented to [facility] initially complaining of shortness of breath.  Please see the admitting history and physical for further details.  He was found to have acute on chronic respiratory failure with hypoxia due to acute exacerbation of COPD and was admitted to the hospital for further evaluation and treatment.      Lives alone at [facility] & started with trouble breathing yesterday & notified 911 for ER evaluation.     Oxygen saturation 90% on room air with audible wheezing per ER provider.  Patient received IV Solu-Medrol 125 mg once and IV furosemide 60 mg once given bilateral lower extremity edema in the setting of multiple missed doses diuretics per patient statement.    Patient received IV Solu-Medrol and IV diuretics during hospital admission with no progression of disease and feels medically stable and also appears medically stable for discharge home with home health therapies pending PT recommendation on 2023 with  short course steroid after discharge.    *Advised non-compliant patient to take diuretics at home to prevent acute CHF exacerbation.    Day of Discharge     Physical Exam:  Temp:  [97.5 °F (36.4 °C)-98 °F (36.7 °C)] 97.6 °F (36.4 °C)  Heart Rate:  [] 102  Resp:  [16-20] 18  BP: (111-132)/(68-75) 128/70  Body mass index is 24.69 kg/m².     Physical Exam  Constitutional:       General: He is not in acute distress.     Appearance: He is not toxic-appearing.   HENT:      Head: Normocephalic and atraumatic.   Eyes:      Extraocular Movements: Extraocular movements intact.      Conjunctiva/sclera: Conjunctivae normal.   Cardiovascular:      Rate and Rhythm: Normal rate.      Heart sounds: Normal heart sounds.   Pulmonary:      Effort: Pulmonary effort is normal.      Breath sounds: No wheezing.      Comments: Diminished on expiration posteriorly  Abdominal:      General: Bowel sounds are normal.      Palpations: Abdomen is soft.   Musculoskeletal:      Cervical back: Normal range of motion and neck supple.      Right lower leg: No edema.      Left lower leg: No edema.   Skin:     General: Skin is warm and dry.   Neurological:      Mental Status: He is alert and oriented to person, place, and time.      Cranial Nerves: No cranial nerve deficit.   Psychiatric:         Behavior: Behavior normal.         Thought Content: Thought content normal.         Consultants     Consult Orders (all) (From admission, onward)     Start     Ordered    05/10/23 1156  Inpatient Case Management  Consult  Once        Provider:  (Not yet assigned)    05/10/23 1155    05/10/23 0858  [facility] (on-call MD unless specified) Details  Once        Specialty:  Hospitalist  Provider:  (Not yet assigned)    05/10/23 0857              Procedures     * Surgery not found *      Imaging Results (All)     Procedure Component Value Units Date/Time    XR Chest 1 View [[id number]] Collected: 05/10/23 0305     Updated: 05/10/23 0305    Narrative:         Patient: RAMIRO CASEY  Time Out: 03:04  Exam(s): XR CXR 1 VIEW     EXAM:    XR Chest, 1 View    CLINICAL HISTORY:     Reason for exam: SOA Triage Protocol.    TECHNIQUE:    Frontal view of the chest.    COMPARISON:    No relevant prior studies available.    FINDINGS:    Lungs:  No consolidation or mass.    Pleural space:  No acute findings    Heart:  No cardiomegaly.    Bones joints:  No acute findings.    IMPRESSION:         No acute cardiopulmonary process.      Impression:          Electronically signed by Evan Bishop MD on 05-10-23 at 0304          Results for orders placed during the hospital encounter of 01/28/23    Adult Transthoracic Echo Complete W/ Cont if Necessary Per Protocol    Interpretation Summary  •  Left ventricular ejection fraction appears to be 51 - 55%.  •  Left ventricular diastolic function was normal.  •  Left atrial volume is mildly increased.  •  There is calcification of the aortic valve.  •  Estimated right ventricular systolic pressure from tricuspid regurgitation is mildly elevated (35-45 mmHg).  •  Moderate dilation of the aortic root is present. Mild dilation of the sinuses of Valsalva is present. Moderate dilation of the ascending aorta is present.    Pertinent Labs     Results from last 7 days   Lab Units 05/11/23  0714 05/10/23  0218   WBC 10*3/mm3 7.31 6.46   HEMOGLOBIN g/dL 11.2* 10.5*   PLATELETS 10*3/mm3 255 227     Results from last 7 days   Lab Units 05/11/23  0714 05/10/23  0218   SODIUM mmol/L 141 142   POTASSIUM mmol/L 4.3 4.1   CHLORIDE mmol/L 101 108*   CO2 mmol/L 26.7 25.8   BUN mg/dL 28* 21   CREATININE mg/dL 1.04 1.02   GLUCOSE mg/dL 155* 126*   EGFR mL/min/1.73 70.8 72.5     Results from last 7 days   Lab Units 05/10/23  0218   ALBUMIN g/dL 3.8   BILIRUBIN mg/dL 0.3   ALK PHOS U/L 63   AST (SGOT) U/L 18   ALT (SGPT) U/L 11     Results from last 7 days   Lab Units 05/11/23  0714 05/10/23  0218   CALCIUM mg/dL 9.3 9.3   ALBUMIN g/dL  --  3.8       Results  from last 7 days   Lab Units 05/10/23  0503 05/10/23  0218   HSTROP T ng/L 26* 32*   PROBNP pg/mL  --  239.0           Invalid input(s): LDLCALC      Results from last 7 days   Lab Units 05/10/23  0701   COVID19  Not Detected       Test Results Pending at Discharge       Discharge Details        Discharge Medications      New Medications      Instructions Start Date   predniSONE 20 MG tablet  Commonly known as: DELTASONE   40 mg, Oral, Daily         Continue These Medications      Instructions Start Date   acetaminophen 500 MG tablet  Commonly known as: TYLENOL   500 mg, Oral, Every 6 Hours PRN      albuterol (2.5 MG/3ML) 0.083% nebulizer solution  Commonly known as: PROVENTIL   2.5 mg, Nebulization, Every 6 Hours PRN      apixaban 5 MG tablet tablet  Commonly known as: ELIQUIS   5 mg, Oral, 2 Times Daily      azelastine 0.1 % nasal spray  Commonly known as: ASTELIN   2 sprays, Nasal, 2 Times Daily, Use in each nostril as directed      benzonatate 200 MG capsule  Commonly known as: TESSALON   200 mg, Oral, 3 Times Daily PRN      budesonide 180 MCG/ACT inhaler  Commonly known as: PULMICORT   2 puffs, Inhalation, 2 Times Daily      calcium carbonate 500 MG chewable tablet  Commonly known as: TUMS   1 tablet, Oral, As Needed      clotrimazole-betamethasone 1-0.05 % cream  Commonly known as: Lotrisone   1 application, Topical, 2 Times Daily, Do not apply to scrotum      FLUoxetine 20 MG capsule  Commonly known as: PROzac   TAKE 1 CAPSULE DAILY      fluticasone 50 MCG/ACT nasal spray  Commonly known as: FLONASE   2 sprays, Each Nare, Daily      furosemide 40 MG tablet  Commonly known as: LASIX   40 mg, Oral, Daily      gabapentin 300 MG capsule  Commonly known as: NEURONTIN   300 mg, Oral, 3 Times Daily      guaifenesin 100 MG/5ML liquid  Commonly known as: ROBITUSSIN   200 mg, Oral, Every 4 Hours PRN      multivitamin with minerals tablet tablet   1 tablet, Oral, Daily      Myrbetriq 25 MG tablet sustained-release 24  hour 24 hr tablet  Generic drug: Mirabegron ER   25 mg, Oral, Daily      naproxen 500 MG tablet  Commonly known as: Naprosyn   500 mg, Oral, 2 Times Daily With Meals      pantoprazole 40 MG EC tablet  Commonly known as: PROTONIX   40 mg, Oral, Daily      potassium chloride 20 MEQ CR tablet  Commonly known as: K-DUR,KLOR-CON   1 tablet, Oral, Daily      sodium chloride 7 % nebulizer solution nebulizer solution   1 vial, Inhalation, 2 Times Daily      terbinafine 250 MG tablet  Commonly known as: lamiSIL   250 mg, Oral, Daily         ASK your doctor about these medications      Instructions Start Date   rOPINIRole 0.5 MG tablet  Commonly known as: REQUIP   0.5 mg, Oral, Nightly, Take 1 hour before bedtime.             Allergies   Allergen Reactions   • Daliresp [Roflumilast] Anaphylaxis   • Latex Rash   • Sulfa Antibiotics Rash       Discharge Disposition:  Home-Health [place] [facility]      Discharge Diet:  Diet Order   Procedures   • Diet: Cardiac Diets; Healthy Heart (2-3 Na+); Texture: Regular Texture (IDDSI 7); Fluid Consistency: Thin (IDDSI 0)       Discharge Activity:   Activity Instructions     Activity as Tolerated            CODE STATUS:    Code Status and Medical Interventions:   Ordered at: 05/10/23 1000     Code Status (Patient has no pulse and is not breathing):    CPR (Attempt to Resuscitate)     Medical Interventions (Patient has pulse or is breathing):    Full Support       Future Appointments   Date Time Provider Department Center   10/16/2023 10:45 AM Johnnie Ang MD [facility]   10/17/2023 10:00 AM [facility]   10/19/2023 10:45 AM Harpreet Kate MD [facility]     Additional Instructions for the Follow-ups that You Need to Schedule     Discharge Follow-up with PCP   As directed       Currently Documented PCP:    Harpreet Kate MD    PCP Phone Number:    [phone]     Follow Up Details: Please call to schedule a 1 week or earliest available follow-up  appointment PCP            Contact information for follow-up providers     Harpreet Kate MD .    Specialty: Internal Medicine  Why: Please call to schedule a 1 week or earliest available follow-up appointment PCP  Contact information:  [address] [facility] [id number] [phone]                   Contact information for after-discharge care     Home Medical Care     [facility] .    Service: Home Nursing  Contact information:  [address], [address]  [facility] [id number] [phone]                             Additional Instructions for the Follow-ups that You Need to Schedule     Discharge Follow-up with PCP   As directed       Currently Documented PCP:    Harpreet Kate MD    PCP Phone Number:    [phone]     Follow Up Details: Please call to schedule a 1 week or earliest available follow-up appointment PCP           Time Spent on Discharge:  Greater than 30 minutes      PABLITO Dobson  [facility]  05/11/23  10:57 EDT

## 2023-05-12 ENCOUNTER — TRANSITIONAL CARE MANAGEMENT TELEPHONE ENCOUNTER (OUTPATIENT)
Dept: CALL CENTER | Facility: HOSPITAL | Age: 83
End: 2023-05-12
Payer: MEDICARE

## 2023-05-12 NOTE — OUTREACH NOTE
Call Center TCM Note    Flowsheet Row Responses   [facility] patient discharged from? [place]   Does the patient have one of the following disease processes/diagnoses(primary or secondary)? COPD   TCM attempt successful? No   Unsuccessful attempts Attempt 1   Call Status Left message          Love Schneider RN    5/12/2023, 11:44 EDT

## 2023-05-12 NOTE — CASE MANAGEMENT/SOCIAL WORK
Case Management Discharge Note      Final Note: DC'd home 5/11 with  [facility] following    Provided Post Acute Provider List?: N/A  N/A Provider List Comment: Has used [facility] in the past and would like to use them again        Home Medical Care Coordination complete.    Service Provider Selected Services Address Phone Fax Patient Preferred    [facility] [address] [facility], [place] [id number] [phone] --                Selected Continued Care - Episodes Includes continued care and service providers with selected services from the active episodes listed below    High Risk Care Management Episode start date: 12/13/2022   There are no active outsourced providers for this episode.               Transportation Services  Private: Car    Final Discharge Disposition Code: 06 - home with home health care

## 2023-05-12 NOTE — OUTREACH NOTE
Call Center TCM Note    Flowsheet Row Responses   [facility] patient discharged from? [place]   Does the patient have one of the following disease processes/diagnoses(primary or secondary)? COPD   TCM attempt successful? Yes   Call start time 1617   Call end time 1625   Is patient permission given to speak with other caregiver? Yes   List who call center can speak with Son-Stef.   Person spoke with today (if not patient) and relationship Stef-son.   Comments Son not in contact with patient currently. Office please call patient to schedule hospital f/u appt.   Does the patient have an appointment with their PCP within 7 days of discharge? No   Nursing Interventions PCP office requested to make appointment - message sent, Routed TCM call to PCP office   Has home health visited the patient within 72 hours of discharge? Call prior to 72 hours   Home health comments Sarita at [facility]  is familiar with patient, and has talked with patient on phone. States will visit next week, and review all meds/follow up appts.   Pulse Ox monitoring None   Psychosocial issues? Yes   Psychosocial comments Son states patient has removed him from POA, but still HCS. States concerned about patient driving, has bought a new car, refused to move to assisted living. Relayed concerns to Sarita at [facility].   What is the patient's perception of their health status since discharge? Same  [Son very concerned about patient.]   TCM call completed? Yes   Wrap up additional comments Son very concerned about patient's ability to drive/live independently. Call to Sarita at [facility] familiar with patient, and will visit next week. Son  has already been in contact with ambulatory . TCM complete.   Call end time 1625   Would this patient benefit from a Referral to Amb Social Work? No   Is the patient interested in additional calls from an ambulatory ?  NOTE:  applies to high risk patients requiring additional  follow-up. Zahra Shcneider RN    5/12/2023, 16:26 EDT

## 2023-05-23 ENCOUNTER — OFFICE VISIT (OUTPATIENT)
Dept: FAMILY MEDICINE CLINIC | Facility: CLINIC | Age: 83
End: 2023-05-23

## 2023-05-23 VITALS
DIASTOLIC BLOOD PRESSURE: 74 MMHG | HEIGHT: 71 IN | SYSTOLIC BLOOD PRESSURE: 123 MMHG | OXYGEN SATURATION: 94 % | WEIGHT: 176 LBS | TEMPERATURE: 97.7 F | HEART RATE: 86 BPM | RESPIRATION RATE: 18 BRPM | BODY MASS INDEX: 24.64 KG/M2

## 2023-05-23 DIAGNOSIS — J02.9 ACUTE PHARYNGITIS, UNSPECIFIED ETIOLOGY: Primary | ICD-10-CM

## 2023-05-23 DIAGNOSIS — J44.1 COPD WITH EXACERBATION: ICD-10-CM

## 2023-05-23 DIAGNOSIS — K21.9 GASTROESOPHAGEAL REFLUX DISEASE WITHOUT ESOPHAGITIS: ICD-10-CM

## 2023-05-23 PROCEDURE — 1160F RVW MEDS BY RX/DR IN RCRD: CPT | Performed by: STUDENT IN AN ORGANIZED HEALTH CARE EDUCATION/TRAINING PROGRAM

## 2023-05-23 PROCEDURE — 99214 OFFICE O/P EST MOD 30 MIN: CPT | Performed by: STUDENT IN AN ORGANIZED HEALTH CARE EDUCATION/TRAINING PROGRAM

## 2023-05-23 PROCEDURE — 3074F SYST BP LT 130 MM HG: CPT | Performed by: STUDENT IN AN ORGANIZED HEALTH CARE EDUCATION/TRAINING PROGRAM

## 2023-05-23 PROCEDURE — 1159F MED LIST DOCD IN RCRD: CPT | Performed by: STUDENT IN AN ORGANIZED HEALTH CARE EDUCATION/TRAINING PROGRAM

## 2023-05-23 PROCEDURE — 3078F DIAST BP <80 MM HG: CPT | Performed by: STUDENT IN AN ORGANIZED HEALTH CARE EDUCATION/TRAINING PROGRAM

## 2023-05-23 RX ORDER — PANTOPRAZOLE SODIUM 40 MG/1
40 TABLET, DELAYED RELEASE ORAL DAILY
Qty: 14 TABLET | Refills: 0 | Status: SHIPPED | OUTPATIENT
Start: 2023-05-23

## 2023-05-23 RX ORDER — AMOXICILLIN AND CLAVULANATE POTASSIUM 875; 125 MG/1; MG/1
1 TABLET, FILM COATED ORAL 2 TIMES DAILY
Qty: 14 TABLET | Refills: 0 | Status: SHIPPED | OUTPATIENT
Start: 2023-05-23

## 2023-05-23 RX ORDER — METHYLPREDNISOLONE 4 MG/1
TABLET ORAL
Qty: 21 TABLET | Refills: 0 | Status: SHIPPED | OUTPATIENT
Start: 2023-05-23

## 2023-05-24 ENCOUNTER — READMISSION MANAGEMENT (OUTPATIENT)
Dept: CALL CENTER | Facility: HOSPITAL | Age: 83
End: 2023-05-24
Payer: MEDICARE

## 2023-05-24 NOTE — ANESTHESIA PREPROCEDURE EVALUATION
Anesthesia Evaluation     Patient summary reviewed and Nursing notes reviewed   no history of anesthetic complications:  NPO Solid Status: > 8 hours  NPO Liquid Status: > 8 hours           Airway   Mallampati: II  TM distance: >3 FB  Neck ROM: full  No difficulty expected  Dental - normal exam     Pulmonary    (+) pneumonia stable , pulmonary embolism, a smoker Former, COPD severe, home oxygen (4 lpm ), recent URI (rhinovirus ),   (-) rhonchi, decreased breath sounds, wheezes  Cardiovascular   Exercise tolerance: good (4-7 METS)    Rhythm: regular  Rate: normal    (+) CHF Diastolic >=55%,   (-) hypertension, CAD, angina, RODRIGUEZ, murmur      Neuro/Psych  (+) psychiatric history ADHD,    (-) CVA  GI/Hepatic/Renal/Endo    (+)  hiatal hernia, GERD,    (-) no renal disease, diabetes    Musculoskeletal     Abdominal     Abdomen: soft.   Substance History      OB/GYN          Other   arthritis,    history of cancer remission                    Anesthesia Plan    ASA 4     general   total IV anesthesia  intravenous induction     Anesthetic plan, risks, benefits, and alternatives have been provided, discussed and informed consent has been obtained with: patient.        CODE STATUS:    Code Status (Patient has no pulse and is not breathing): CPR (Attempt to Resuscitate)  Medical Interventions (Patient has pulse or is breathing): Full Support       Impression: Vitreous degeneration, bilateral: H43.813. Plan: No vitreous cells, retinal tears/holes, or detachments observed today. Patient to RTC immediately if notice sudden onset or worsening flashing lights, floaters, or a curtain over vision.

## 2023-05-24 NOTE — OUTREACH NOTE
COPD/PN Week 2 Survey    Flowsheet Row Responses   [facility] patient discharged from? [place]   Does the patient have one of the following disease processes/diagnoses(primary or secondary)? COPD   Week 2 attempt successful? Yes   Call start time 1156   Call end time 1159   General alerts for this patient [facility]   Discharge diagnosis Acute exacerbation of chronic obstructive pulmonary disease, Acute on chronic respiratory failure with hypoxia   Meds reviewed with patient/caregiver? Yes   Is the patient having any side effects they believe may be caused by any medication additions or changes? No   Does the patient have all medications ordered at discharge? Yes   Is the patient taking all medications as directed (includes completed medication regime)? Yes   Does the patient have a primary care provider?  Yes   Comments regarding PCP Patient states he has seen his pcp.   Has the patient kept scheduled appointments due by today? Yes   What is the Home health agency?  [facility]   Has home health visited the patient within 72 hours of discharge? Yes   Pulse Ox monitoring None   Psychosocial issues? Yes   Did the patient receive a copy of their discharge instructions? Yes   Nursing interventions Reviewed instructions with patient   What is the patient's perception of their health status since discharge? New symptoms unrelated to diagnosis  [Patient states he is having back pain.]   Nursing Interventions Nurse provided patient education   Is the patient/caregiver able to teach back the hierarchy of who to call/visit for symptoms/problems? PCP, Specialist, Home health nurse, Urgent Care, ED, 911 Yes   Is the patient able to teach back COPD zones? Yes   Nursing interventions Education provided on various zones   Patient reports what zone on this call? Green Zone   Green Zone Reports doing well, Breathing without shortness of breath, Usual activity and exercise level, Usual amounts of cough and  phlegm/mucous, Slept well last night, Appetite is good   Green Zone interventions: Take daily medications, Use oxygen as prescribed, Continue regular exercise/diet plan   Week 2 call completed? Yes   Is the patient interested in additional calls from an ambulatory ?  NOTE:  applies to high risk patients requiring additional follow-up. No          Franci GUILLAUME - Licensed Nurse

## 2023-05-30 NOTE — PROGRESS NOTES
"Chief Complaint  Altered Mental Status (Fuzzy headed, fatigue, indegestion)    Subjective        Tony Leonard presents to [facility]  History of Present Illness  For difficulty breathing on and off. Patient stated he is using all his inhalers but sometimes he gets out of breath. Patients stated since he is not on CNS stimulant medication for ADHD ( due to high risk of ACS) he feels his mind is not that active . Also complains of feeling fatigue and having upset stomach since discharge from the hospital  Also complained of sore throat, denies having any fever or sinus symptoms   Denies having any abdominal pain, nausea, vomiting , loose stools , constipation or any change in bladder habits rcently      Objective   Vital Signs:  /74   Pulse 86   Temp 97.7 °F (36.5 °C)   Resp 18   Ht 180.3 cm (70.98\")   Wt 79.8 kg (176 lb)   SpO2 94%   BMI 24.56 kg/m²   Estimated body mass index is 24.56 kg/m² as calculated from the following:    Height as of this encounter: 180.3 cm (70.98\").    Weight as of this encounter: 79.8 kg (176 lb).       BMI is within normal parameters. No other follow-up for BMI required.      Physical Exam  HENT:      Mouth/Throat:      Mouth: Mucous membranes are moist.   Eyes:      Extraocular Movements: Extraocular movements intact.      Conjunctiva/sclera: Conjunctivae normal.      Pupils: Pupils are equal, round, and reactive to light.   Cardiovascular:      Pulses: Normal pulses.   Pulmonary:      Effort: Respiratory distress present.      Breath sounds: Wheezing present.   Chest:      Chest wall: No tenderness.   Musculoskeletal:      Cervical back: Normal range of motion.   Skin:     General: Skin is warm.   Neurological:      Mental Status: He is alert and oriented to person, place, and time.   Psychiatric:         Mood and Affect: Mood normal.        Result Review :                   Assessment and Plan   Diagnoses and all orders for this " visit:    1. Acute pharyngitis, unspecified etiology (Primary)  -     amoxicillin-clavulanate (AUGMENTIN) 875-125 MG per tablet; Take 1 tablet by mouth 2 (Two) Times a Day.  Dispense: 14 tablet; Refill: 0  -     methylPREDNISolone (MEDROL) 4 MG dose pack; Take as directed on package instructions.  Dispense: 21 tablet; Refill: 0    2. COPD with exacerbation  -     methylPREDNISolone (MEDROL) 4 MG dose pack; Take as directed on package instructions.  Dispense: 21 tablet; Refill: 0    3. Gastroesophageal reflux disease without esophagitis  -     pantoprazole (PROTONIX) 40 MG EC tablet; Take 1 tablet by mouth Daily. Empty stomach half an hour before meals  Dispense: 14 tablet; Refill: 0    Advise to take antibiotics for acute pharyngitis. For COPD exacerbation pt is advised to continue using Anora ellipta and prescribed tapering dose of steroid.  Also advised to take pantoprazole for 7 days empty stomach .  RTc if symptoms worsens. Patient agreed and showed verbalized understanding.         Follow Up   No follow-ups on file.  Patient was given instructions and counseling regarding his condition or for health maintenance advice. Please see specific information pulled into the AVS if appropriate.

## 2023-06-01 ENCOUNTER — READMISSION MANAGEMENT (OUTPATIENT)
Dept: CALL CENTER | Facility: HOSPITAL | Age: 83
End: 2023-06-01

## 2023-06-01 NOTE — OUTREACH NOTE
COPD/PN Week 3 Survey    Flowsheet Row Responses   [facility] patient discharged from? [place]   Does the patient have one of the following disease processes/diagnoses(primary or secondary)? COPD   Week 3 attempt successful? Yes   Call start time 1155   Call end time 1157   Discharge diagnosis Acute exacerbation of chronic obstructive pulmonary disease, Acute on chronic respiratory failure with hypoxia   Meds reviewed with patient/caregiver? Yes   Is the patient having any side effects they believe may be caused by any medication additions or changes? No   Does the patient have all medications ordered at discharge? Yes   Is the patient taking all medications as directed (includes completed medication regime)? Yes   Comments regarding appointments OV with PCP 5/23/23   Does the patient have a primary care provider?  Yes   Does the patient have an appointment with their PCP or specialist within 7 days of discharge? Greater than 7 days   Nursing Interventions Verified appointment date/time/provider   Has the patient kept scheduled appointments due by today? Yes   What is the Home health agency?  N/A   Pulse Ox monitoring None   Psychosocial issues? Yes  [Hx. ADHD]   Did the patient receive a copy of their discharge instructions? Yes   Nursing interventions Reviewed instructions with patient   What is the patient's perception of their health status since discharge? Improving   Nursing Interventions Nurse provided patient education   If the patient is a current smoker, are they able to teach back resources for cessation? Not a smoker   Is the patient/caregiver able to teach back the hierarchy of who to call/visit for symptoms/problems? PCP, Specialist, Home health nurse, Urgent Care, ED, 911 Yes   Is the patient able to teach back COPD zones? Yes   Nursing interventions Education provided on various zones   Patient reports what zone on this call? Green Zone   Green Zone Reports doing well, Slept well last night,  Appetite is good   Green Zone interventions: Take daily medications, Use oxygen as prescribed   Week 3 call completed? Yes   Is the patient interested in additional calls from an ambulatory ?  NOTE:  applies to high risk patients requiring additional follow-up. Zahra HERNANDEZ - Registered Nurse

## 2023-06-13 ENCOUNTER — PATIENT OUTREACH (OUTPATIENT)
Dept: CASE MANAGEMENT | Facility: OTHER | Age: 83
End: 2023-06-13
Payer: MEDICARE

## 2023-06-13 NOTE — OUTREACH NOTE
AMBULATORY CASE MANAGEMENT NOTE    Name and Relationship of Patient/Support Person: Horacio Tony L - Self    Adult Patient Profile  Questions/Answers      Flowsheet Row Most Recent Value   Symptoms/Conditions Managed at Home respiratory, cardiovascular, musculoskeletal   Barriers to Managing Health age, understanding health advice   Cardiovascular Symptoms/Conditions hypertension, heart failure   Cardiovascular Management Strategies medication therapy, routine screening   Cardiovascular Self-Management Outcome 4 (good)   Musculoskeletal Symptoms/Conditions unsteady gait, back pain, joint pain   Musculoskeletal Management Strategies activity, medication therapy  [using walker for safe ambulation]   Musculoskeletal Self-Management Outcome 4 (good)   Musculoskeletal Comment states doing good but having hip pain today and will take OTC medications for pain as needed   Respiratory Symptoms/Conditions COPD, asthma   Respiratory Management Strategies adequate rest, activity, medication therapy, oxygen therapy   Oxygen Therapy Device nasal cannula   Oxygen Therapy Times night time only, as needed   Oxygen Flow (L/min) 2L NC   Respiratory Self-Management Outcome 4 (good)   Respiratory Comment reviewed medication management and breathing exercises   Barriers to Taking Medication as Prescribed none          SDOH updated and reviewed with the patient during this program:  Physical Activity: Sufficiently Active    Days of Exercise per Week: 7 days    Minutes of Exercise per Session: 30 min     Patient Outreach    Outgoing call to the patient for follow up.  He states that  services have ended at this time.  He has all needed DME.  He has had no issues with medications and reviewed.  He states that he is now walking every day for 30 min.  He is using his oxygen at night and prn.  He states his appetite has improved and he does have Meals on Wheels once weekly on Tuesday delivered.  He does do his own cooking or shares meals  with his neighbors that have cooked.  He denies needs today.  Care plan reviewed and updated.  Outreach for about 4-6 weeks has been scheduled.  Verified he has contact for ACM RN and encouraged outreach with any needs.     Azeb PHAM  Ambulatory Case Management    6/13/2023, 11:45 EDT

## 2023-08-11 ENCOUNTER — TELEPHONE (OUTPATIENT)
Dept: FAMILY MEDICINE CLINIC | Facility: CLINIC | Age: 83
End: 2023-08-11
Payer: MEDICARE

## 2023-08-11 NOTE — TELEPHONE ENCOUNTER
Caller: AZAEL DANIELLA    Best call back number: [phone]     What was the call regarding: AZAEL IS ASKING IF A REFERRAL CAN BE WRITTEN FOR PATIENT'S SCOOTER SO THAT IT CAN BE COVERED BY INSURANCE. SHE IS ALSO REQUESTING A WRITTEN FORM FOR A HANDICAP PLACARD FOR HIS CAR TO BE MAILED TO PATIENT. PLEASE ADVISE.

## 2023-08-11 NOTE — ED NOTES
Nursing report ED to floor  Tony Leonard  82 y.o.  male    HPI :   Chief Complaint   Patient presents with    Hip Pain    Leg Pain       Admitting doctor:   Alberto Tomlinson MD    Admitting diagnosis:   The primary encounter diagnosis was Bilateral hip pain. A diagnosis of Unable to ambulate was also pertinent to this visit.    Code status:   Current Code Status       Date Active Code Status Order ID Comments User Context       11/21/2022 1324 CPR (Attempt to Resuscitate) [id number]  Alberto Tomlinson MD ED        Question Answer    Code Status (Patient has no pulse and is not breathing) CPR (Attempt to Resuscitate)    Medical Interventions (Patient has pulse or is breathing) Full Support                    Allergies:   Daliresp [roflumilast], Latex, and Sulfa antibiotics    Isolation:   No active isolations    Intake and Output  No intake or output data in the 24 hours ending 11/21/22 1404    Weight:   There were no vitals filed for this visit.    Most recent vitals:   Vitals:    11/21/22 0947 11/21/22 0948 11/21/22 1133 11/21/22 1309   BP: 107/50  150/71    Pulse: 66 64 80 71   Resp:       Temp:       TempSrc:       SpO2: 94% 93% 97% 95%       Active LDAs/IV Access:   Lines, Drains & Airways       Active LDAs       Name Placement date Placement time Site Days    Peripheral IV 11/21/22 0903 Left;Posterior Hand 11/21/22  0903  Hand  less than 1                    Labs (abnormal labs have a star):   Labs Reviewed   BASIC METABOLIC PANEL - Abnormal; Notable for the following components:       Result Value    BUN 34 (*)     Calcium 8.5 (*)     BUN/Creatinine Ratio 34.0 (*)     All other components within normal limits    Narrative:     GFR Normal >60  Chronic Kidney Disease <60  Kidney Failure <15    The GFR formula is only valid for adults with stable renal function between ages 18 and 70.   CBC WITH AUTO DIFFERENTIAL - Abnormal; Notable for the following components:    WBC 11.16 (*)     MCV 98.8 (*)     RDW 12.0 (*)      Problem: Pain  Goal: Verbalizes/displays adequate comfort level or baseline comfort level  Outcome: Progressing     Problem: Safety - Adult  Goal: Free from fall injury  Outcome: Progressing     Problem: Skin/Tissue Integrity  Goal: Absence of new skin breakdown  Description: 1. Monitor for areas of redness and/or skin breakdown  2. Assess vascular access sites hourly  3. Every 4-6 hours minimum:  Change oxygen saturation probe site  4. Every 4-6 hours:  If on nasal continuous positive airway pressure, respiratory therapy assess nares and determine need for appliance change or resting period.   Outcome: Progressing     Problem: Gastrointestinal - Adult  Goal: Minimal or absence of nausea and vomiting  Outcome: Progressing     Problem: Gastrointestinal - Adult  Goal: Maintains adequate nutritional intake  Outcome: Progressing     Problem: Infection - Adult  Goal: Absence of infection at discharge  Outcome: Progressing     Problem: Metabolic/Fluid and Electrolytes - Adult  Goal: Electrolytes maintained within normal limits  Outcome: Progressing     Problem: Metabolic/Fluid and Electrolytes - Adult  Goal: Glucose maintained within prescribed range  Outcome: Progressing     Problem: Hematologic - Adult  Goal: Maintains hematologic stability  Outcome: Progressing     Problem: Chronic Conditions and Co-morbidities  Goal: Patient's chronic conditions and co-morbidity symptoms are monitored and maintained or improved  Outcome: Progressing    Problem: Nutrition Deficit:  Goal: Optimize nutritional status  Outcome: Progressing     Problem: Discharge Planning  Goal: Discharge to home or other facility with appropriate resources  Outcome: Progressing Immature Grans % 0.6 (*)     Neutrophils, Absolute 7.18 (*)     Monocytes, Absolute 1.12 (*)     Eosinophils, Absolute 0.55 (*)     Immature Grans, Absolute 0.07 (*)     All other components within normal limits   CBC AND DIFFERENTIAL    Narrative:     The following orders were created for panel order CBC & Differential.  Procedure                               Abnormality         Status                     ---------                               -----------         ------                     CBC Auto Differential[[id number]]        Abnormal            Final result                 Please view results for these tests on the individual orders.       EKG:   No orders to display       Meds given in ED:   Medications   sodium chloride 0.9 % flush 10 mL (has no administration in time range)   sodium chloride 0.9 % flush 10 mL (10 mL Intravenous Given 11/21/22 1328)   sodium chloride 0.9 % flush 10 mL (has no administration in time range)   sodium chloride 0.9 % infusion 40 mL (has no administration in time range)   HYDROmorphone (DILAUDID) injection 0.5 mg (0.5 mg Intravenous Given 11/21/22 0903)   ondansetron (ZOFRAN) injection 4 mg (4 mg Intravenous Given 11/21/22 0905)       Imaging results:  XR Spine Lumbar Complete 4+VW    Result Date: 11/21/2022   No acute fracture is identified in the lumbar spine, pelvis, or either hip. Chronic, postsurgical, and degenerative changes. Follow-up/further evaluation can be obtained as indications persist.  This report was finalized on 11/21/2022 9:59 AM by Dr. Tj Evans M.D.      XR Hips Bilateral With or Without Pelvis 5 View    Result Date: 11/21/2022   No acute fracture is identified in the lumbar spine, pelvis, or either hip. Chronic, postsurgical, and degenerative changes. Follow-up/further evaluation can be obtained as indications persist.  This report was finalized on 11/21/2022 9:59 AM by Dr. Tj Evans M.D.       Ambulatory status:   - bedrest    Social  issues:   Social History     Socioeconomic History    Marital status: Single   Tobacco Use    Smoking status: Never    Smokeless tobacco: Never   Vaping Use    Vaping Use: Never used   Substance and Sexual Activity    Alcohol use: No     Comment: red wine occassional    Drug use: No    Sexual activity: Defer       NIH Stroke Scale:         Rowdy Jackson RN  11/21/22 14:04 EST

## 2023-08-14 NOTE — TELEPHONE ENCOUNTER
Unsure of how sammie is called pt directly and advised will need appt for provider before an order for a scooter may be placed.  Pt understood and scheduled appt.

## 2023-08-17 ENCOUNTER — REFERRAL TRIAGE (OUTPATIENT)
Dept: CASE MANAGEMENT | Facility: OTHER | Age: 83
End: 2023-08-17
Payer: MEDICARE

## 2023-08-17 ENCOUNTER — TELEPHONE (OUTPATIENT)
Dept: CASE MANAGEMENT | Facility: OTHER | Age: 83
End: 2023-08-17
Payer: MEDICARE

## 2023-08-17 ENCOUNTER — OFFICE VISIT (OUTPATIENT)
Dept: FAMILY MEDICINE CLINIC | Facility: CLINIC | Age: 83
End: 2023-08-17
Payer: MEDICARE

## 2023-08-17 VITALS
TEMPERATURE: 98 F | HEIGHT: 71 IN | OXYGEN SATURATION: 97 % | WEIGHT: 200 LBS | DIASTOLIC BLOOD PRESSURE: 70 MMHG | BODY MASS INDEX: 28 KG/M2 | RESPIRATION RATE: 16 BRPM | HEART RATE: 100 BPM | SYSTOLIC BLOOD PRESSURE: 138 MMHG

## 2023-08-17 DIAGNOSIS — M43.16 SPONDYLOLISTHESIS OF LUMBAR REGION: ICD-10-CM

## 2023-08-17 DIAGNOSIS — J42 CHRONIC BRONCHITIS, UNSPECIFIED CHRONIC BRONCHITIS TYPE: ICD-10-CM

## 2023-08-17 DIAGNOSIS — G89.29 CHRONIC BILATERAL LOW BACK PAIN WITH RIGHT-SIDED SCIATICA: ICD-10-CM

## 2023-08-17 DIAGNOSIS — I50.32 CHRONIC DIASTOLIC CHF (CONGESTIVE HEART FAILURE): Primary | ICD-10-CM

## 2023-08-17 DIAGNOSIS — M54.41 CHRONIC BILATERAL LOW BACK PAIN WITH RIGHT-SIDED SCIATICA: ICD-10-CM

## 2023-08-17 DIAGNOSIS — Z99.81 ON HOME OXYGEN THERAPY: ICD-10-CM

## 2023-08-17 DIAGNOSIS — M15.9 PRIMARY OSTEOARTHRITIS INVOLVING MULTIPLE JOINTS: ICD-10-CM

## 2023-08-17 PROBLEM — E87.6 HYPOKALEMIA: Status: ACTIVE | Noted: 2020-10-01

## 2023-08-17 PROCEDURE — 1159F MED LIST DOCD IN RCRD: CPT | Performed by: NURSE PRACTITIONER

## 2023-08-17 PROCEDURE — 3075F SYST BP GE 130 - 139MM HG: CPT | Performed by: NURSE PRACTITIONER

## 2023-08-17 PROCEDURE — 99214 OFFICE O/P EST MOD 30 MIN: CPT | Performed by: NURSE PRACTITIONER

## 2023-08-17 PROCEDURE — 3078F DIAST BP <80 MM HG: CPT | Performed by: NURSE PRACTITIONER

## 2023-08-17 PROCEDURE — 1160F RVW MEDS BY RX/DR IN RCRD: CPT | Performed by: NURSE PRACTITIONER

## 2023-08-17 RX ORDER — AZITHROMYCIN 250 MG/1
TABLET, FILM COATED ORAL
COMMUNITY
Start: 2023-06-28

## 2023-08-17 RX ORDER — BENRALIZUMAB 30 MG/ML
INJECTION, SOLUTION SUBCUTANEOUS
COMMUNITY
Start: 2023-06-05

## 2023-08-17 NOTE — PROGRESS NOTES
Subjective     Tony Leonard is a 83 y.o.. male.     Patient here today for request for electric scooter.     Patient stating that when he walks too much with walker he gets short of breath and looses his balance. Patient weak and has a hard time using arms to push himself up in chair. Patient stating any time he uses much energy he gets real short of breath and weaker. Patient with problem list that includes CHF, COPD and continuous home oxygen. Patient stating he uses a walker at home and out but even getting around in his home seems to take a long time to move around and by the time he gets to where he needs to be he has to sit down and catch his breath. Patient seeing pain management for riky. hip pain, OA, spondylolisthesis of lumbar and right side sciatica. This also causes him to have a hard time getting up and down out of chairs and walking. Patient has seen physical therapy in past for these issues.       The following portions of the patient's history were reviewed and updated as appropriate: allergies, current medications, past family history, past medical history, past social history, past surgical history and problem list.    Past Medical History:   Diagnosis Date    ADHD (attention deficit hyperactivity disorder)     Bronchiectasis     CHF (congestive heart failure)     Colon polyp     COPD (chronic obstructive pulmonary disease)     Diverticulosis     Hard of hearing     On home oxygen therapy     2 LITERS    Pneumonia     Prostate cancer 04/22/2019    Spinal stenosis, lumbar region with neurogenic claudication 08/02/2022       Past Surgical History:   Procedure Laterality Date    BRONCHOSCOPY N/A 4/30/2016    Procedure: BRONCHOSCOPY with BAL of right lower lobe and left  lower lobe.  ;  Surgeon: Nando Diaz MD;  Location: [facility] ENDOSCOPY;  Service:     BRONCHOSCOPY N/A 4/28/2017    Procedure: BRONCHOSCOPY;  Surgeon: Katharina Salter MD;  Location: [facility] ENDOSCOPY;  Service:     BRONCHOSCOPY  Bilateral 6/29/2017    Procedure: BRONCHOSCOPY WITH WASHINGS;  Surgeon: Katharina Salter MD;  Location: [facility] ENDOSCOPY;  Service:     BRONCHOSCOPY N/A 1/22/2018    Procedure: BRONCHOSCOPY AT BEDSIDE with BAL;  Surgeon: Katharina Salter MD;  Location: [facility] ENDOSCOPY;  Service:     BRONCHOSCOPY N/A 1/30/2018    Procedure: BRONCHOSCOPY with BAL and washing;  Surgeon: Shreyas Wild MD;  Location: [facility] ENDOSCOPY;  Service:     BRONCHOSCOPY Bilateral 4/24/2018    Procedure: BRONCHOSCOPY WITH WASHING;  Surgeon: Katharina Salter MD;  Location: [facility] ENDOSCOPY;  Service: Pulmonary    BRONCHOSCOPY N/A 12/28/2019    Procedure: BRONCHOSCOPY with bilateral washing;  Surgeon: Katharina Salter MD;  Location: [facility] ENDOSCOPY;  Service: Pulmonary    BRONCHOSCOPY Bilateral 1/4/2023    Procedure: BRONCHOSCOPY with lavage;  Surgeon: Katharina Salter MD;  Location: [facility] ENDOSCOPY;  Service: Pulmonary;  Laterality: Bilateral;  mucus plugging    CARDIAC CATHETERIZATION N/A 1/29/2023    Procedure: Left Heart Cath;  Surgeon: Johnnie Ang MD;  Location: [facility] CATH INVASIVE LOCATION;  Service: Cardiology;  Laterality: N/A;    CARDIAC CATHETERIZATION N/A 1/29/2023    Procedure: Coronary angiography;  Surgeon: Johnnie Ang MD;  Location: [facility] CATH INVASIVE LOCATION;  Service: Cardiology;  Laterality: N/A;    COLONOSCOPY  05/17/2013    eh, ih, tort, sig tics    COLONOSCOPY N/A 5/10/2019    Non-thrombosed external hemorrhoids found on perianal exam, Diverticulosis, Tortuous colon, One 5 mm polyp in the mid ascending colon, IH. Path: Tubular adenoma.     COLONOSCOPY N/A 9/24/2022    Procedure: COLONOSCOPY to cecum and TI with argon plasma coagulation.;  Surgeon: Bruce Black MD;  Location: [facility] ENDOSCOPY;  Service: Gastroenterology;  Laterality: N/A;  pre- GI bleeding  post- radiation proctitis, diverticulosis    ENDOSCOPY  03/19/2015    z line irreg, hh    ENDOSCOPY N/A 9/24/2022    Procedure:  "ESOPHAGOGASTRODUODENOSCOPY;  Surgeon: Bruce Black MD;  Location: [facility] ENDOSCOPY;  Service: Gastroenterology;  Laterality: N/A;  pre- GI bleeding  post- esophagitis, hiatal hernia    HIP OPEN REDUCTION Right 1/17/2018    Procedure: HIP OPEN REDUCTION INTERNAL FIXATION WITH DYNAMIC HIP SCREW;  Surgeon: Les Black MD;  Location: [facility] OR;  Service:     SPINE SURGERY      TONSILLECTOMY      TOTAL HIP ARTHROPLASTY REVISION Right 9/23/2019    Procedure: HIP  REVISION RIGHT;  Surgeon: Isauro Warner MD;  Location: [facility] OR;  Service: Orthopedics       Review of Systems   Respiratory:  Positive for shortness of breath.    Cardiovascular: Negative.    Musculoskeletal:  Positive for arthralgias and gait problem.   Neurological:  Positive for weakness.     Allergies   Allergen Reactions    Daliresp [Roflumilast] Anaphylaxis    Latex Rash    Sulfa Antibiotics Rash       Objective     Vitals:    08/17/23 1013   BP: 138/70   Pulse: 100   Resp: 16   Temp: 98 øF (36.7 øC)   SpO2: 97%   Weight: 90.7 kg (200 lb)   Height: 180 cm (70.87\")     Body mass index is 28 kg/mý.    Physical Exam  Vitals reviewed.   HENT:      Head: Normocephalic.   Eyes:      Pupils: Pupils are equal, round, and reactive to light.   Cardiovascular:      Rate and Rhythm: Normal rate and regular rhythm.   Pulmonary:      Effort: No respiratory distress.      Breath sounds: Decreased breath sounds present. No wheezing, rhonchi or rales.   Musculoskeletal:      Comments: Using walker, takes several attempts to get from sitting to standing, walks slowly and appears to be having pain with movement; fine motor issues noted in office (needing help to use zipper)   Skin:     General: Skin is warm and dry.   Neurological:      Mental Status: He is alert.      Motor: Weakness (riky., upper arms/hands) and tremor (to right hand) present.      Gait: Gait abnormal.   Psychiatric:         Speech: Speech normal.         Behavior: Behavior is " cooperative.         Current Outpatient Medications:     acetaminophen (TYLENOL) 500 MG tablet, Take 1 tablet by mouth Every 6 (Six) Hours As Needed for Mild Pain., Disp: , Rfl:     albuterol (PROVENTIL) (2.5 MG/3ML) 0.083% nebulizer solution, Take 2.5 mg by nebulization Every 6 (Six) Hours As Needed for Wheezing., Disp: 360 mL, Rfl: 3    amoxicillin-clavulanate (AUGMENTIN) 875-125 MG per tablet, Take 1 tablet by mouth 2 (Two) Times a Day., Disp: 14 tablet, Rfl: 0    azelastine (ASTELIN) 0.1 % nasal spray, 2 sprays into the nostril(s) as directed by provider 2 (Two) Times a Day. Use in each nostril as directed, Disp: 30 mL, Rfl: 0    azithromycin (ZITHROMAX) 250 MG tablet, , Disp: , Rfl:     calcium carbonate (TUMS) 500 MG chewable tablet, Chew 1 tablet As Needed for Indigestion or Heartburn., Disp: , Rfl:     clotrimazole-betamethasone (Lotrisone) 1-0.05 % cream, Apply 1 application topically to the appropriate area as directed 2 (Two) Times a Day. Do not apply to scrotum, Disp: 45 g, Rfl: 1    Fasenra Pen 30 MG/ML solution auto-injector, , Disp: , Rfl:     FLUoxetine (PROzac) 20 MG capsule, TAKE 1 CAPSULE DAILY, Disp: 90 capsule, Rfl: 3    fluticasone (FLONASE) 50 MCG/ACT nasal spray, 2 sprays by Each Nare route Daily., Disp: 11.1 mL, Rfl: 1    furosemide (LASIX) 40 MG tablet, Take 1 tablet by mouth Daily., Disp: , Rfl:     gabapentin (NEURONTIN) 300 MG capsule, Take 1 capsule by mouth 3 (Three) Times a Day., Disp: 270 capsule, Rfl: 1    guaifenesin (ROBITUSSIN) 100 MG/5ML liquid, Take 10 mL by mouth Every 4 (Four) Hours As Needed for Cough., Disp: 118 mL, Rfl: 1    lidocaine (LIDODERM) 5 %, Place 1 patch on the skin as directed by provider Daily. Remove & Discard patch within 12 hours or as directed by MD, Disp: 6 each, Rfl: 0    methylPREDNISolone (MEDROL) 4 MG dose pack, Take as directed on package instructions., Disp: 21 tablet, Rfl: 0    Mirabegron ER (MYRBETRIQ) 25 MG tablet sustained-release 24 hour 24 hr  tablet, Take 1 tablet by mouth Daily., Disp: , Rfl:     Multiple Vitamins-Minerals (MULTIVITAMIN ADULT PO), Take 1 tablet by mouth Daily., Disp: , Rfl:     naproxen (Naprosyn) 500 MG tablet, Take 1 tablet by mouth 2 (Two) Times a Day With Meals., Disp: 180 tablet, Rfl: 3    pantoprazole (PROTONIX) 40 MG EC tablet, Take 1 tablet by mouth Daily. Empty stomach half an hour before meals, Disp: 90 tablet, Rfl: 0    potassium chloride (K-DUR,KLOR-CON) 20 MEQ CR tablet, Take 1 tablet by mouth Daily., Disp: , Rfl:     rOPINIRole (REQUIP) 0.5 MG tablet, Take 1 tablet by mouth Every Night. Take 1 hour before bedtime. (Patient taking differently: Take 1 tablet by mouth Every Night.), Disp: 90 tablet, Rfl: 1    sodium chloride 7 % nebulizer solution nebulizer solution, Inhale 1 vial 2 (Two) Times a Day., Disp: , Rfl:     tiZANidine (ZANAFLEX) 2 MG tablet, Take 1 tablet by mouth At Night As Needed for Muscle Spasms., Disp: 30 tablet, Rfl: 1    Umeclidinium-Vilanterol (ANORO ELLIPTA) 62.5-25 MCG/ACT aerosol powder  inhaler, Inhale 1 puff Daily., Disp: , Rfl:     apixaban (ELIQUIS) 5 MG tablet tablet, Take 1 tablet by mouth 2 (Two) Times a Day. (Patient not taking: Reported on 5/23/2023), Disp: 60 tablet, Rfl: 5    budesonide (PULMICORT) 180 MCG/ACT inhaler, Inhale 2 puffs 2 (Two) Times a Day. (Patient not taking: Reported on 5/23/2023), Disp: 1 each, Rfl: 3    terbinafine (lamiSIL) 250 MG tablet, Take 1 tablet by mouth Daily. (Patient not taking: Reported on 5/23/2023), Disp: , Rfl:     Current Facility-Administered Medications:     aspirin chewable tablet 81 mg, 81 mg, Oral, Once, Tenisha Xavier APRN        CT Abdomen Pelvis With Contrast  Narrative: CT ABDOMEN AND PELVIS WITH IV CONTRAST     HISTORY: Nausea vomiting upper abdominal pain, prior pulmonary embolus.  Gastritis, GERD. Increased burning and irritation chest. TECHNIQUE:  Radiation dose reduction techniques were utilized, including automated  exposure control and  exposure modulation based on body size.  3 mm  images were obtained through the abdomen and pelvis after the  administration of IV contrast.      COMPARISON: CT chest 12/10/2022, 07/29/2021     FINDINGS:  Lower chest: Gynecomastia incompletely imaged. Bibasilar atelectasis  and/or scarring. Image quality somewhat degraded by motion artifact.     Liver: Lobular contour similar to the prior. The colon is interposed  anterior to the liver.     Biliary tract: Within normal limits.     Spleen: Within normal limits.     Pancreas: Parenchymal thinning.     Adrenals: Within normal limits.     Kidneys:  No hydronephrosis.     Bowel:  Thickening of the distal esophagus and proximal stomach with  trace subjacent fluid, small hiatal hernia, and mildly prominent nodes  measuring up to 7 mm. The stomach is incompletely distended limiting  evaluation. No obstruction. Colonic diverticulosis without acute  diverticulitis. Retained contrast within the colon. No free air or  obvious pneumatosis. Normal appendix Peritoneum: Within normal limits.     Vasculature:    Moderate calcific atherosclerosis abdominal aorta and  branch vessels.     Lymph Nodes:  Scattered subcentimeter abdominal and pelvic nodes.                                                             Pelvic organs: Streak artifact from right hip prosthesis somewhat limits  evaluation. Distended bladder. Vascular calcifications/phleboliths.     Abdominal/Pelvic Wall: Fat-containing umbilical hernia. Subcutaneous  soft tissue edema lumbosacral region.     Bones: Sclerotic focus right ninth rib, unchanged. Demineralization with  advanced degenerative changes thoracolumbar spine and pelvis. Multiple  compression fractures most prominent at compression fracture L1 with  greater than 80% loss of vertebral body height, retropulsion, canal  stenosis similar to the prior. Increased wedging of T12. New compression  fracture L3 with greater than 80% loss of vertebral body  height,  kyphosis, and canal stenosis.     Impression: 1.  Increased circumferential thickening of the distal esophagus with  subjacent trace fluid and adenopathy suggesting esophagitis however,  consider further evaluation with endoscopy.  2.  Progression of compression fractures most severe at L3 with greater  than 80% loss of vertebral body height, kyphosis, and canal stenosis.  Consider further evaluation with MRI.  3.  Please see above for additional findings/recommendations.           This report was finalized on 7/5/2023 3:59 PM by Dr. Lv Salas M.D.     XR Chest 1 View  Narrative: XR CHEST 1 VW-     HISTORY: Male who is 83 years-old,  heartburn     TECHNIQUE: Frontal view of the chest     COMPARISON: 5/10/2023     FINDINGS: The heart size is borderline. Aorta is calcified, tortuous.  Pulmonary vasculature is unremarkable. No focal pulmonary consolidation,  pleural effusion, or pneumothorax. No acute osseous process.     Impression: No focal pulmonary consolidation. Borderline heart size.  Tortuous aorta. Follow-up as clinically indicated.     This report was finalized on 7/5/2023 12:15 PM by Dr. Tj Evans M.D.           Diagnoses and all orders for this visit:    1. Chronic diastolic CHF (congestive heart failure) (Primary)  -     Ambulatory Referral to Case Management Medication Adherence, Rising Risk, HRCM - High Risk Care Management    2. Chronic bronchitis, unspecified chronic bronchitis type  -     Ambulatory Referral to Case Management Medication Adherence, Rising Risk, HRCM - High Risk Care Management    3. On home oxygen therapy  -     Ambulatory Referral to Case Management Medication Adherence, Rising Risk, HRCM - High Risk Care Management    4. Spondylolisthesis of lumbar region  -     Ambulatory Referral to Physical Therapy Evaluate and treat    5. Chronic bilateral low back pain with right-sided sciatica  -     Ambulatory Referral to Physical Therapy Evaluate and treat    6. Primary  osteoarthritis involving multiple joints  -     Ambulatory Referral to Physical Therapy Evaluate and treat        Patient Instructions   Writing script out for electric scooter and sending to [facility] per Patient's request. Writing script due to Patient's assessment and symptoms discussed today.   Ordering physical therapy (and possibly OT)   Ordering case management to see if any other at home needs that may need to be addressed.    Return if symptoms worsen or fail to improve, for Dr. Kate as needed/as recommended.

## 2023-08-17 NOTE — TELEPHONE ENCOUNTER
Thank you for the referral.  I have scheduled an outreach for the patient today.  He is currently enrolled in my HR program and has been since December of 2022 had been unable to reach lately and recently not participating in phone calls and non compliant.  I think HH is a great idea for him and I see those orders you placed today.

## 2023-08-17 NOTE — PATIENT INSTRUCTIONS
Writing script out for electric scooter and sending to [facility] per Patient's request. Writing script due to Patient's assessment and symptoms discussed today.   Ordering physical therapy (and possibly OT)   Ordering case management to see if any other at home needs that may need to be addressed.

## 2023-08-22 ENCOUNTER — PATIENT OUTREACH (OUTPATIENT)
Dept: CASE MANAGEMENT | Facility: OTHER | Age: 83
End: 2023-08-22
Payer: MEDICARE

## 2023-08-22 NOTE — OUTREACH NOTE
AMBULATORY CASE MANAGEMENT NOTE    Name and Relationship of Patient/Support Person: Tony Leonard - Self  Self    Patient Outreach    Outgoing call to the patient for follow up and PCP referral.  Patient declines call today and states he is about to leave the house and [place].  Outreach has been scheduled for later this week as he prefers.     Azeb PHAM  Ambulatory Case Management    8/22/2023, 11:06 EDT

## 2023-08-24 ENCOUNTER — PATIENT OUTREACH (OUTPATIENT)
Dept: CASE MANAGEMENT | Facility: OTHER | Age: 83
End: 2023-08-24
Payer: MEDICARE

## 2023-08-24 NOTE — LETTER
August 24, 2023         Patient: Tony Leonard   YOB: 1940       Dear Dr. Kate:    I am contacting you about your patient, Tony Leonard.  My name is Azeb Obregon RN and I provide care coordination services to select patients on behalf of [facility], an Accountable Care Organization participating in the Medicare Shared Savings Program.   Your membership in this organization qualifies your Medicare attributed patients for free care coordination services. This is a voluntary program that is not intended as a substitute to professional medical advice but does strive to provide additional education, care coordination, and case management for your patients.      I am writing to inform you that Tony has enrolled in a more in-depth portion of our program - care advising.  Over the coming weeks I, as a registered nurse, will work with your patient on an individual basis.  The goal of this program is to provide education on chronic conditions, medication adherence and appropriate utilization of services.  If the need is identified, we also have access to professional pharmacists, social workers, and dietitians that can serve as additional resources to your assigned patients.     Please contact us at any time to discuss your patient or our program in more detail.  You may reach me by telephone at [phone] or with an In-Basket message if you have EPIC access.      Sincerely,      zAeb Obregon RN

## 2023-08-24 NOTE — OUTREACH NOTE
AMBULATORY CASE MANAGEMENT NOTE    Name and Relationship of Patient/Support Person: Tony Leonard - Self  Self    Adult Patient Profile  Questions/Answers      Flowsheet Row Most Recent Value   Symptoms/Conditions Managed at Home --  [patient declined to answer]   Taken More or Less Medication Than Prescribed no   Barriers to Taking Medication as Prescribed none  [patient reports taking his prescribed medications and having no issues obtaining his medications]          SDOH updated and reviewed with the patient during this program:  Financial Resource Strain: Low Risk     Difficulty of Paying Living Expenses: Not very hard      Physical Activity: Sufficiently Active    Days of Exercise per Week: 7 days    Minutes of Exercise per Session: 30 min      Food Insecurity: No Food Insecurity    Worried About Running Out of Food in the Last Year: Never true    Ran Out of Food in the Last Year: Never true      Transportation Needs: No Transportation Needs    Lack of Transportation (Medical): No    Lack of Transportation (Non-Medical): No      Housing Stability: Low Risk     Unable to Pay for Housing in the Last Year: No    Number of Places Lived in the Last Year: 1    Unstable Housing in the Last Year: No      Stress: No Stress Concern Present    Feeling of Stress : Not at all     Patient Outreach    Outgoing call to the patient for follow up. He states he would like to participate in HRCM program and verbalized understanding that there is no cost for services and he may opt out at any time.  He states that he is compliant with his medications and has no issues with obtaining his medications at this time.  Care plan and goals reviewed and discussed.  He prefers an outreach in about 4 weeks and this has been scheduled.  He states that [facility] is coming today to check his oxygen and denies any issues with DME or use.         Azeb PHAM  Ambulatory Case Management    8/24/2023, 10:11 EDT

## 2023-09-07 ENCOUNTER — OFFICE VISIT (OUTPATIENT)
Dept: GASTROENTEROLOGY | Facility: CLINIC | Age: 83
End: 2023-09-07
Payer: MEDICARE

## 2023-09-07 VITALS
TEMPERATURE: 97.1 F | BODY MASS INDEX: 29.67 KG/M2 | DIASTOLIC BLOOD PRESSURE: 54 MMHG | OXYGEN SATURATION: 96 % | WEIGHT: 195.8 LBS | SYSTOLIC BLOOD PRESSURE: 87 MMHG | HEART RATE: 92 BPM | HEIGHT: 68 IN

## 2023-09-07 DIAGNOSIS — D64.9 ANEMIA, UNSPECIFIED TYPE: ICD-10-CM

## 2023-09-07 DIAGNOSIS — K62.7 RADIATION PROCTITIS: ICD-10-CM

## 2023-09-07 DIAGNOSIS — K21.9 GASTROESOPHAGEAL REFLUX DISEASE, UNSPECIFIED WHETHER ESOPHAGITIS PRESENT: Primary | ICD-10-CM

## 2023-09-07 PROCEDURE — 3074F SYST BP LT 130 MM HG: CPT | Performed by: INTERNAL MEDICINE

## 2023-09-07 PROCEDURE — 3078F DIAST BP <80 MM HG: CPT | Performed by: INTERNAL MEDICINE

## 2023-09-07 PROCEDURE — 1160F RVW MEDS BY RX/DR IN RCRD: CPT | Performed by: INTERNAL MEDICINE

## 2023-09-07 PROCEDURE — 99213 OFFICE O/P EST LOW 20 MIN: CPT | Performed by: INTERNAL MEDICINE

## 2023-09-07 PROCEDURE — 1159F MED LIST DOCD IN RCRD: CPT | Performed by: INTERNAL MEDICINE

## 2023-09-07 NOTE — PROGRESS NOTES
Chief Complaint   Patient presents with    Heartburn    Constipation        Tony Leonard is a  83 y.o. male here for a follow up visit for GERD, history of anemia, history of radiation-induced proctitis    HPI this 83-year-old white male patient of Dr. Kate presents in follow-up since last seen in this office in January 2022.  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  He is on Eliquis for history of pulmonary emboli.  He denies any current GI bleeding issues and review of his labs reflects a very mild anemia with a hemoglobin of 11 and hematocrit of 33.6.  He is managing his reflux with Protonix but did have a mild exacerbation and addressed this with use of a probiotic.  He has subsequently stopped the probiotic and seems to be doing well in that regard.  He was also noted to be hypotensive with a blood pressure of 87/54 and a heart rate of 96.  He denies any orthostatic changes but I encouraged him to discuss this with his primary care tender to make sure that he does not become orthostatic or run the risk of falling.  We also did talk about his continued use of anticoagulants but I will defer to his prescribing physician as to the length of time that is required.  He will follow-up in this office on an annual basis but call sooner as needed for any GI related complaints.    Past Medical History:   Diagnosis Date    ADHD (attention deficit hyperactivity disorder)     Bronchiectasis     CHF (congestive heart failure)     Colon polyp     COPD (chronic obstructive pulmonary disease)     Diverticulosis     Hard of hearing     On home oxygen therapy     2 LITERS    Pneumonia     Prostate cancer 04/22/2019    Spinal stenosis, lumbar region with neurogenic claudication 08/02/2022       Current Outpatient Medications   Medication Sig Dispense Refill     albuterol (PROVENTIL) (2.5 MG/3ML) 0.083% nebulizer solution Take 2.5 mg by nebulization Every 6 (Six) Hours As Needed for Wheezing. 360 mL 3    azelastine (ASTELIN) 0.1 % nasal spray 2 sprays into the nostril(s) as directed by provider 2 (Two) Times a Day. Use in each nostril as directed 30 mL 0    azithromycin (ZITHROMAX) 250 MG tablet       calcium carbonate (TUMS) 500 MG chewable tablet Chew 1 tablet As Needed for Indigestion or Heartburn.      clotrimazole-betamethasone (Lotrisone) 1-0.05 % cream Apply 1 application topically to the appropriate area as directed 2 (Two) Times a Day. Do not apply to scrotum 45 g 1    Fasenra Pen 30 MG/ML solution auto-injector       FLUoxetine (PROzac) 20 MG capsule TAKE 1 CAPSULE DAILY 90 capsule 3    fluticasone (FLONASE) 50 MCG/ACT nasal spray 2 sprays by Each Nare route Daily. 11.1 mL 1    furosemide (LASIX) 40 MG tablet Take 1 tablet by mouth Daily.      gabapentin (NEURONTIN) 300 MG capsule Take 1 capsule by mouth 3 (Three) Times a Day. 270 capsule 1    lidocaine (LIDODERM) 5 % Place 1 patch on the skin as directed by provider Daily. Remove & Discard patch within 12 hours or as directed by MD 6 each 0    Multiple Vitamins-Minerals (MULTIVITAMIN ADULT PO) Take 1 tablet by mouth Daily.      pantoprazole (PROTONIX) 40 MG EC tablet Take 1 tablet by mouth Daily. Empty stomach half an hour before meals 90 tablet 0    potassium chloride (K-DUR,KLOR-CON) 20 MEQ CR tablet Take 1 tablet by mouth Daily.      sodium chloride 7 % nebulizer solution nebulizer solution Inhale 1 vial 2 (Two) Times a Day.      tiZANidine (ZANAFLEX) 2 MG tablet Take 1 tablet by mouth At Night As Needed for Muscle Spasms. 30 tablet 1    Umeclidinium-Vilanterol (ANORO ELLIPTA) 62.5-25 MCG/ACT aerosol powder  inhaler Inhale 1 puff Daily.      acetaminophen (TYLENOL) 500 MG tablet Take 1 tablet by mouth Every 6 (Six) Hours As Needed for Mild Pain. (Patient not taking: Reported on 9/7/2023)       amoxicillin-clavulanate (AUGMENTIN) 875-125 MG per tablet Take 1 tablet by mouth 2 (Two) Times a Day. (Patient not taking: Reported on 9/7/2023) 14 tablet 0    apixaban (ELIQUIS) 5 MG tablet tablet Take 1 tablet by mouth 2 (Two) Times a Day. (Patient not taking: Reported on 5/23/2023) 60 tablet 5    budesonide (PULMICORT) 180 MCG/ACT inhaler Inhale 2 puffs 2 (Two) Times a Day. (Patient not taking: Reported on 5/23/2023) 1 each 3    guaifenesin (ROBITUSSIN) 100 MG/5ML liquid Take 10 mL by mouth Every 4 (Four) Hours As Needed for Cough. (Patient not taking: Reported on 9/7/2023) 118 mL 1    methylPREDNISolone (MEDROL) 4 MG dose pack Take as directed on package instructions. (Patient not taking: Reported on 9/7/2023) 21 tablet 0    Mirabegron ER (MYRBETRIQ) 25 MG tablet sustained-release 24 hour 24 hr tablet Take 1 tablet by mouth Daily. (Patient not taking: Reported on 9/7/2023)      naproxen (Naprosyn) 500 MG tablet Take 1 tablet by mouth 2 (Two) Times a Day With Meals. (Patient not taking: Reported on 9/7/2023) 180 tablet 3    rOPINIRole (REQUIP) 0.5 MG tablet Take 1 tablet by mouth Every Night. Take 1 hour before bedtime. (Patient not taking: Reported on 9/7/2023) 90 tablet 1    terbinafine (lamiSIL) 250 MG tablet Take 1 tablet by mouth Daily. (Patient not taking: Reported on 5/23/2023)       Current Facility-Administered Medications   Medication Dose Route Frequency Provider Last Rate Last Admin    aspirin chewable tablet 81 mg  81 mg Oral Once Tenisha Xavier APRN           PRN Meds:.    Allergies   Allergen Reactions    Daliresp [Roflumilast] Anaphylaxis    Latex Rash    Sulfa Antibiotics Rash       Social History     Socioeconomic History    Marital status: Single   Tobacco Use    Smoking status: Never    Smokeless tobacco: Never   Vaping Use    Vaping Use: Never used   Substance and Sexual Activity    Alcohol use: No     Comment: red wine occassional    Drug use: No    Sexual activity: Defer       Family  History   Problem Relation Age of Onset    Thyroid disease Mother     No Known Problems Father     Malig Hyperthermia Neg Hx        Review of Systems   Constitutional:  Negative for activity change, appetite change, fatigue and unexpected weight change.   HENT:  Negative for congestion, facial swelling, sore throat, trouble swallowing and voice change.    Eyes:  Negative for photophobia and visual disturbance.   Respiratory:  Negative for cough and choking.    Cardiovascular:  Negative for chest pain.   Gastrointestinal:  Negative for abdominal distention, abdominal pain, anal bleeding, blood in stool, constipation, diarrhea, nausea, rectal pain and vomiting.   Endocrine: Negative for polyphagia.   Musculoskeletal:  Negative for arthralgias, gait problem and joint swelling.   Skin:  Negative for color change, pallor and rash.   Allergic/Immunologic: Negative for food allergies.   Neurological:  Negative for speech difficulty and headaches.   Hematological:  Does not bruise/bleed easily.   Psychiatric/Behavioral:  Negative for agitation, confusion and sleep disturbance.      Vitals:    09/07/23 1320   BP: (!) 87/54   Pulse: 92   Temp: 97.1 °F (36.2 °C)   SpO2: 96%       Physical Exam  Constitutional:       Appearance: He is well-developed.   HENT:      Head: Normocephalic.   Eyes:      Conjunctiva/sclera: Conjunctivae normal.   Cardiovascular:      Rate and Rhythm: Normal rate and regular rhythm.   Pulmonary:      Breath sounds: Normal breath sounds.   Abdominal:      General: Bowel sounds are normal.      Palpations: Abdomen is soft.   Musculoskeletal:         General: Normal range of motion.      Cervical back: Normal range of motion.      Comments: Uses a walker for ambulation   Skin:     General: Skin is warm and dry.   Neurological:      Mental Status: He is alert and oriented to person, place, and time.   Psychiatric:         Behavior: Behavior normal.       ASSESSMENT   #1 GERD: Controlled with PPI  #2  anemia  #3 radiation proctitis treated with coagulation  #4 hypotension: Patient instructed to communicate with primary care tender for further work-up      PLAN  Continue current medical regimen  Anticipate follow-up in 1 year, patient to call sooner as needed for any GI related complaints.      ICD-10-CM ICD-9-CM   1. Gastroesophageal reflux disease, unspecified whether esophagitis present  K21.9 530.81   2. Radiation proctitis  K62.7 569.49   3. Anemia, unspecified type  D64.9 285.9

## 2023-09-21 ENCOUNTER — PATIENT OUTREACH (OUTPATIENT)
Dept: CASE MANAGEMENT | Facility: OTHER | Age: 83
End: 2023-09-21
Payer: MEDICARE

## 2023-09-21 NOTE — OUTREACH NOTE
AMBULATORY CASE MANAGEMENT NOTE    Name and Relationship of Patient/Support Person: Tony Leonard L - Self  Self    Adult Patient Profile  Questions/Answers      Flowsheet Row Most Recent Value   Symptoms/Conditions Managed at Home musculoskeletal   Barriers to Managing Health age, understanding health advice   Musculoskeletal Symptoms/Conditions back pain, unsteady gait, joint pain   Musculoskeletal Management Strategies routine screening, coping strategies, adequate rest, other (see comments)   Musculoskeletal Self-Management Outcome 2 (bad)   Musculoskeletal Comment chronic lower back pain   Chronic Pain Goal/Acceptable Level 5   Chronic Pain Baseline at Rest 5  [rates pain at an 8 today at rest and with movement or activity]   Chronic Pain Baseline with Activity 5   Chronic Pain Location hips and low back with his back hurting more today   Chronic Pain Frequency constant, frequent, intermittent   Chronic Pain Quality aching   Chronic Pain Associated Signs/Symptoms anxiety   Chronic Pain Aggravating Factors activity, inactivity   Chronic Pain Management Strategies heat application, positioning, complementary therapy   Chronic Pain Self-Management Outcome 3 (uncertain)   Chronic Pain Comment having a bad day today with his lower back pain that is chronic   Missed Doses of Prescribed Medications During Past Week no   Taken Prescribed Medications at Different Time or Schedule During Past Week no   Taken More or Less Medication Than Prescribed no   Barriers to Taking Medication as Prescribed none          Patient Outreach    Outgoing call to the patient for follow up outreach.  His main issue today is his chronic back pain.  Do to his preference of short phone call only worked on care plan and goals of chronic pain. Discussed that he forgot to go to his outpatient PT appointment that was yesterday.  He prefers to call to reschedule himself and provided him with the phone number.  He does not use medication for his  pain and prefers to use heat today and rest.  Discussed trying OTC lidocaine that comes in creams and or patches.  He prefers this idea.  He states he is compliant with his medications.  He prefers an outreach in about one month. This has been scheduled and plan to work on other care goals and assessments.       Azeb PHAM  Ambulatory Case Management    9/21/2023, 15:26 EDT

## 2023-09-27 DIAGNOSIS — K21.9 GASTROESOPHAGEAL REFLUX DISEASE WITHOUT ESOPHAGITIS: ICD-10-CM

## 2023-09-27 RX ORDER — PANTOPRAZOLE SODIUM 40 MG/1
TABLET, DELAYED RELEASE ORAL
Qty: 90 TABLET | Refills: 3 | Status: SHIPPED | OUTPATIENT
Start: 2023-09-27

## 2023-10-18 ENCOUNTER — APPOINTMENT (OUTPATIENT)
Dept: CT IMAGING | Facility: HOSPITAL | Age: 83
End: 2023-10-18
Payer: MEDICARE

## 2023-10-18 ENCOUNTER — HOSPITAL ENCOUNTER (EMERGENCY)
Facility: HOSPITAL | Age: 83
Discharge: HOME | End: 2023-10-18
Attending: EMERGENCY MEDICINE | Admitting: EMERGENCY MEDICINE
Payer: MEDICARE

## 2023-10-18 ENCOUNTER — APPOINTMENT (OUTPATIENT)
Dept: GENERAL RADIOLOGY | Facility: HOSPITAL | Age: 83
End: 2023-10-18
Payer: MEDICARE

## 2023-10-18 VITALS
RESPIRATION RATE: 18 BRPM | OXYGEN SATURATION: 98 % | HEART RATE: 66 BPM | BODY MASS INDEX: 25.2 KG/M2 | HEIGHT: 71 IN | WEIGHT: 180 LBS | TEMPERATURE: 98.2 F | DIASTOLIC BLOOD PRESSURE: 62 MMHG | SYSTOLIC BLOOD PRESSURE: 133 MMHG

## 2023-10-18 DIAGNOSIS — R05.1 ACUTE COUGH: ICD-10-CM

## 2023-10-18 DIAGNOSIS — R19.5 DARK STOOLS: ICD-10-CM

## 2023-10-18 DIAGNOSIS — R60.0 LOWER EXTREMITY EDEMA: ICD-10-CM

## 2023-10-18 DIAGNOSIS — R10.30 LOWER ABDOMINAL PAIN: Primary | ICD-10-CM

## 2023-10-18 LAB
ABO GROUP BLD: NORMAL
ALBUMIN SERPL-MCNC: 4.2 G/DL (ref 3.5–5.2)
ALBUMIN/GLOB SERPL: 2.1 G/DL
ALP SERPL-CCNC: 57 U/L (ref 39–117)
ALT SERPL W P-5'-P-CCNC: 17 U/L (ref 1–41)
ANION GAP SERPL CALCULATED.3IONS-SCNC: 8 MMOL/L (ref 5–15)
APTT PPP: 26.1 SECONDS (ref 22.7–35.4)
AST SERPL-CCNC: 16 U/L (ref 1–40)
B PARAPERT DNA SPEC QL NAA+PROBE: NOT DETECTED
B PERT DNA SPEC QL NAA+PROBE: NOT DETECTED
BASOPHILS # BLD AUTO: 0.01 10*3/MM3 (ref 0–0.2)
BASOPHILS NFR BLD AUTO: 0.2 % (ref 0–1.5)
BILIRUB SERPL-MCNC: 0.6 MG/DL (ref 0–1.2)
BLD GP AB SCN SERPL QL: NEGATIVE
BUN SERPL-MCNC: 18 MG/DL (ref 8–23)
BUN/CREAT SERPL: 19.6 (ref 7–25)
C PNEUM DNA NPH QL NAA+NON-PROBE: NOT DETECTED
CALCIUM SPEC-SCNC: 9.3 MG/DL (ref 8.6–10.5)
CHLORIDE SERPL-SCNC: 104 MMOL/L (ref 98–107)
CO2 SERPL-SCNC: 27 MMOL/L (ref 22–29)
CREAT SERPL-MCNC: 0.92 MG/DL (ref 0.76–1.27)
DEPRECATED RDW RBC AUTO: 43.9 FL (ref 37–54)
EGFRCR SERPLBLD CKD-EPI 2021: 81.5 ML/MIN/1.73
EOSINOPHIL # BLD AUTO: 0 10*3/MM3 (ref 0–0.4)
EOSINOPHIL NFR BLD AUTO: 0 % (ref 0.3–6.2)
ERYTHROCYTE [DISTWIDTH] IN BLOOD BY AUTOMATED COUNT: 15.5 % (ref 12.3–15.4)
FLUAV SUBTYP SPEC NAA+PROBE: NOT DETECTED
FLUBV RNA ISLT QL NAA+PROBE: NOT DETECTED
GLOBULIN UR ELPH-MCNC: 2 GM/DL
GLUCOSE SERPL-MCNC: 98 MG/DL (ref 65–99)
HADV DNA SPEC NAA+PROBE: NOT DETECTED
HCOV 229E RNA SPEC QL NAA+PROBE: NOT DETECTED
HCOV HKU1 RNA SPEC QL NAA+PROBE: NOT DETECTED
HCOV NL63 RNA SPEC QL NAA+PROBE: NOT DETECTED
HCOV OC43 RNA SPEC QL NAA+PROBE: NOT DETECTED
HCT VFR BLD AUTO: 35.3 % (ref 37.5–51)
HGB BLD-MCNC: 11.4 G/DL (ref 13–17.7)
HMPV RNA NPH QL NAA+NON-PROBE: NOT DETECTED
HPIV1 RNA ISLT QL NAA+PROBE: NOT DETECTED
HPIV2 RNA SPEC QL NAA+PROBE: NOT DETECTED
HPIV3 RNA NPH QL NAA+PROBE: NOT DETECTED
HPIV4 P GENE NPH QL NAA+PROBE: NOT DETECTED
IMM GRANULOCYTES # BLD AUTO: 0.01 10*3/MM3 (ref 0–0.05)
IMM GRANULOCYTES NFR BLD AUTO: 0.2 % (ref 0–0.5)
INR PPP: 1.01 (ref 0.9–1.1)
LYMPHOCYTES # BLD AUTO: 1.57 10*3/MM3 (ref 0.7–3.1)
LYMPHOCYTES NFR BLD AUTO: 31.7 % (ref 19.6–45.3)
M PNEUMO IGG SER IA-ACNC: NOT DETECTED
MAGNESIUM SERPL-MCNC: 2.5 MG/DL (ref 1.6–2.4)
MCH RBC QN AUTO: 25.8 PG (ref 26.6–33)
MCHC RBC AUTO-ENTMCNC: 32.3 G/DL (ref 31.5–35.7)
MCV RBC AUTO: 79.9 FL (ref 79–97)
MONOCYTES # BLD AUTO: 0.48 10*3/MM3 (ref 0.1–0.9)
MONOCYTES NFR BLD AUTO: 9.7 % (ref 5–12)
NEUTROPHILS NFR BLD AUTO: 2.88 10*3/MM3 (ref 1.7–7)
NEUTROPHILS NFR BLD AUTO: 58.2 % (ref 42.7–76)
NRBC BLD AUTO-RTO: 0 /100 WBC (ref 0–0.2)
NT-PROBNP SERPL-MCNC: 380 PG/ML (ref 0–1800)
PLATELET # BLD AUTO: 218 10*3/MM3 (ref 140–450)
PMV BLD AUTO: 8.8 FL (ref 6–12)
POTASSIUM SERPL-SCNC: 3.9 MMOL/L (ref 3.5–5.2)
PROT SERPL-MCNC: 6.2 G/DL (ref 6–8.5)
PROTHROMBIN TIME: 13.4 SECONDS (ref 11.7–14.2)
RBC # BLD AUTO: 4.42 10*6/MM3 (ref 4.14–5.8)
RH BLD: POSITIVE
RHINOVIRUS RNA SPEC NAA+PROBE: NOT DETECTED
RSV RNA NPH QL NAA+NON-PROBE: NOT DETECTED
SARS-COV-2 RNA NPH QL NAA+NON-PROBE: NOT DETECTED
SODIUM SERPL-SCNC: 139 MMOL/L (ref 136–145)
T&S EXPIRATION DATE: NORMAL
WBC NRBC COR # BLD: 4.95 10*3/MM3 (ref 3.4–10.8)

## 2023-10-18 PROCEDURE — 85610 PROTHROMBIN TIME: CPT | Performed by: EMERGENCY MEDICINE

## 2023-10-18 PROCEDURE — 93005 ELECTROCARDIOGRAM TRACING: CPT | Performed by: EMERGENCY MEDICINE

## 2023-10-18 PROCEDURE — 71045 X-RAY EXAM CHEST 1 VIEW: CPT

## 2023-10-18 PROCEDURE — 85025 COMPLETE CBC W/AUTO DIFF WBC: CPT | Performed by: EMERGENCY MEDICINE

## 2023-10-18 PROCEDURE — 85730 THROMBOPLASTIN TIME PARTIAL: CPT | Performed by: EMERGENCY MEDICINE

## 2023-10-18 PROCEDURE — 36415 COLL VENOUS BLD VENIPUNCTURE: CPT

## 2023-10-18 PROCEDURE — 96375 TX/PRO/DX INJ NEW DRUG ADDON: CPT

## 2023-10-18 PROCEDURE — 86850 RBC ANTIBODY SCREEN: CPT | Performed by: EMERGENCY MEDICINE

## 2023-10-18 PROCEDURE — 25010000002 ONDANSETRON PER 1 MG: Performed by: EMERGENCY MEDICINE

## 2023-10-18 PROCEDURE — 83735 ASSAY OF MAGNESIUM: CPT | Performed by: EMERGENCY MEDICINE

## 2023-10-18 PROCEDURE — 96374 THER/PROPH/DIAG INJ IV PUSH: CPT

## 2023-10-18 PROCEDURE — 25010000002 FUROSEMIDE PER 20 MG: Performed by: EMERGENCY MEDICINE

## 2023-10-18 PROCEDURE — 0202U NFCT DS 22 TRGT SARS-COV-2: CPT | Performed by: EMERGENCY MEDICINE

## 2023-10-18 PROCEDURE — 93010 ELECTROCARDIOGRAM REPORT: CPT | Performed by: INTERNAL MEDICINE

## 2023-10-18 PROCEDURE — 83880 ASSAY OF NATRIURETIC PEPTIDE: CPT | Performed by: EMERGENCY MEDICINE

## 2023-10-18 PROCEDURE — 80053 COMPREHEN METABOLIC PANEL: CPT | Performed by: EMERGENCY MEDICINE

## 2023-10-18 PROCEDURE — 74176 CT ABD & PELVIS W/O CONTRAST: CPT

## 2023-10-18 PROCEDURE — 99284 EMERGENCY DEPT VISIT MOD MDM: CPT

## 2023-10-18 PROCEDURE — 86900 BLOOD TYPING SEROLOGIC ABO: CPT | Performed by: EMERGENCY MEDICINE

## 2023-10-18 PROCEDURE — 86901 BLOOD TYPING SEROLOGIC RH(D): CPT | Performed by: EMERGENCY MEDICINE

## 2023-10-18 RX ORDER — ONDANSETRON 4 MG/1
4 TABLET, ORALLY DISINTEGRATING ORAL EVERY 8 HOURS PRN
Qty: 15 TABLET | Refills: 0 | Status: SHIPPED | OUTPATIENT
Start: 2023-10-18

## 2023-10-18 RX ORDER — DICYCLOMINE HYDROCHLORIDE 10 MG/1
10 CAPSULE ORAL 3 TIMES DAILY PRN
Qty: 30 CAPSULE | Refills: 0 | Status: SHIPPED | OUTPATIENT
Start: 2023-10-18 | End: 2023-10-18 | Stop reason: SDUPTHER

## 2023-10-18 RX ORDER — ONDANSETRON 2 MG/ML
8 INJECTION INTRAMUSCULAR; INTRAVENOUS ONCE
Status: COMPLETED | OUTPATIENT
Start: 2023-10-18 | End: 2023-10-18

## 2023-10-18 RX ORDER — DICYCLOMINE HYDROCHLORIDE 10 MG/1
10 CAPSULE ORAL 3 TIMES DAILY PRN
Qty: 30 CAPSULE | Refills: 0 | Status: SHIPPED | OUTPATIENT
Start: 2023-10-18

## 2023-10-18 RX ORDER — FUROSEMIDE 10 MG/ML
40 INJECTION INTRAMUSCULAR; INTRAVENOUS ONCE
Status: COMPLETED | OUTPATIENT
Start: 2023-10-18 | End: 2023-10-18

## 2023-10-18 RX ORDER — ONDANSETRON 4 MG/1
4 TABLET, ORALLY DISINTEGRATING ORAL EVERY 8 HOURS PRN
Qty: 15 TABLET | Refills: 0 | Status: SHIPPED | OUTPATIENT
Start: 2023-10-18 | End: 2023-10-18 | Stop reason: SDUPTHER

## 2023-10-18 RX ADMIN — FUROSEMIDE 40 MG: 10 INJECTION, SOLUTION INTRAVENOUS at 22:10

## 2023-10-18 RX ADMIN — ONDANSETRON 8 MG: 2 INJECTION INTRAMUSCULAR; INTRAVENOUS at 21:34

## 2023-10-18 NOTE — ED PROVIDER NOTES
EMERGENCY DEPARTMENT ENCOUNTER  Room Number:  39/39  Date of encounter:  10/18/2023  PCP: Harpreet Kate MD  Patient Care Team:  Harpreet Kate MD as PCP - General (Internal Medicine)  Katharina Saletr MD as Consulting Physician (Pulmonary Disease)  Anum Bhatt MD as Consulting Physician (Pain Medicine)  Azeb Cook, RN as Ambulatory  (Population Health)     HPI:  Context: Tony Leonard is a 83 y.o. male who presents to the ED c/o chief complaint of dark stool and swelling of his ankles.  Patient complains of dark stool today, denies any blood in it, was not tarry.  Patient reports it might have been dark yesterday.  Patient reports nausea, did have 1 episode of emesis, emesis nonbloody nonbilious, denies any coffee-ground emesis.  Patient complains of cramping abdominal pain.  No urinary symptoms, no fever or systemic symptoms.  Patient reports he also has swelling of bilateral legs, reports it is chronic in nature, denies any change.  Patient denies any chest pain, reports he has chronic shortness of breath secondary to COPD, this is unchanged.  Patient reports he has been coughing, cough is nonproductive.    MEDICAL HISTORY REVIEW  Reviewed in EPIC    PAST MEDICAL HISTORY  Active Ambulatory Problems     Diagnosis Date Noted    Thrush, oral 03/11/2016    ADD (attention deficit disorder) 03/11/2016    Hemorrhoids 03/11/2016    Bronchiectasis with acute lower respiratory infection 03/11/2016    Diverticul disease small and large intestine, no perforati or abscess 03/11/2016    Benign non-nodular prostatic hyperplasia without lower urinary tract symptoms 03/11/2016    Gastroesophageal reflux disease 03/11/2016    Malaise and fatigue 03/11/2016    Environmental allergies 04/25/2016    Hemoptysis 04/27/2016    Dyslipidemia 05/11/2016    Primary osteoarthritis involving multiple joints 05/11/2016    Pneumonia of right lower lobe due to infectious organism 10/03/2016    Abnormal EKG 10/04/2016     COPD (chronic obstructive pulmonary disease) 10/04/2016    Mild malnutrition 03/28/2017    Acute on chronic respiratory failure with hypoxia 04/27/2017    Fracture, intertrochanteric, right femur, closed, initial encounter 01/16/2018    Chronic diastolic CHF (congestive heart failure) 01/16/2018    Hematoma of frontal scalp 01/16/2018    Postoperative anemia due to acute blood loss 01/19/2018    Urinary retention 01/25/2018    Hemorrhagic disorder due to circulating anticoagulants 01/29/2018    History of fracture of right hip 02/22/2018    Closed fracture of right hip with routine healing 02/28/2018    Chronic low back pain with sciatica 06/21/2018    Change in bowel function 04/18/2019    Rectal bleeding 04/18/2019    Prostate cancer 04/22/2019    On home oxygen therapy 09/24/2019    Acute viral bronchitis 12/29/2019    Peripheral neuropathy 07/01/2021    Plantar fasciitis 09/08/2021    HTN (hypertension) 05/06/2022    COPD exacerbation 05/07/2022    Bilateral lower extremity edema 05/07/2022    Microscopic hematuria 05/08/2022    Acute midline low back pain with right-sided sciatica 08/01/2022    Spinal stenosis, lumbar region with neurogenic claudication 08/02/2022    Compression deformity of vertebra 08/02/2022    Rectal bleed 09/23/2022    Esophagitis 09/24/2022    Angiectasia 09/27/2022    Hiatal hernia 09/27/2022    Overweight (BMI 25.0-29.9) 11/14/2022    Leukocytosis 11/15/2022    Bilateral hip pain 11/21/2022    Elevated troponin level not due myocardial infarction 12/10/2022    Nocturnal hypoxia 12/10/2022    Pneumonia of right upper lobe due to infectious organism 12/29/2022    NSTEMI (non-ST elevated myocardial infarction) 01/29/2023    Chronic respiratory failure with hypoxia 01/29/2023    Paroxysmal atrial fibrillation 02/17/2023    Acute exacerbation of chronic obstructive pulmonary disease (COPD) 05/10/2023    Anemia 05/10/2023    Chronic anticoagulation 05/10/2023    Non-compliant patient  05/11/2023    Hypokalemia 10/01/2020    Spondylolisthesis of lumbar region 08/14/2018     Resolved Ambulatory Problems     Diagnosis Date Noted    Pneumonia of both lower lobes due to infectious organism 03/11/2016    Pneumonia of right upper lobe due to infectious organism 04/22/2016    COPD exacerbation 04/25/2016    GERD (gastroesophageal reflux disease) 04/25/2016    Chronic respiratory failure with hypoxia 10/04/2016    Bacterial lobar pneumonia 12/27/2016    Pneumonia of left lower lobe due to infectious organism 03/26/2017    Bronchitis 06/01/2017    COPD exacerbation 06/17/2017    Leukocytosis 01/16/2018    Right upper lobe pneumonia 01/20/2018    Mucus plugging of bronchi 01/16/2018    COPD exacerbation 04/16/2018    Degenerative joint disease (DJD) of hip 09/23/2019    Bronchiectasis with (acute) exacerbation 12/28/2019    Septic shock 11/26/2022    Acute saddle pulmonary embolism without acute cor pulmonale  - 12/11/2022 12/11/2022     Past Medical History:   Diagnosis Date    ADHD (attention deficit hyperactivity disorder)     Bronchiectasis     CHF (congestive heart failure)     Colon polyp     Diverticulosis     Hard of hearing     Pneumonia        PAST SURGICAL HISTORY  Past Surgical History:   Procedure Laterality Date    BRONCHOSCOPY N/A 4/30/2016    Procedure: BRONCHOSCOPY with BAL of right lower lobe and left  lower lobe.  ;  Surgeon: Nando Diaz MD;  Location: [facility] ENDOSCOPY;  Service:     BRONCHOSCOPY N/A 4/28/2017    Procedure: BRONCHOSCOPY;  Surgeon: Katharina Salter MD;  Location: [facility] ENDOSCOPY;  Service:     BRONCHOSCOPY Bilateral 6/29/2017    Procedure: BRONCHOSCOPY WITH WASHINGS;  Surgeon: Katharina Salter MD;  Location: [facility] ENDOSCOPY;  Service:     BRONCHOSCOPY N/A 1/22/2018    Procedure: BRONCHOSCOPY AT BEDSIDE with BAL;  Surgeon: Katharina Salter MD;  Location: [facility] ENDOSCOPY;  Service:     BRONCHOSCOPY N/A 1/30/2018    Procedure: BRONCHOSCOPY with BAL and washing;  Surgeon:  Shreyas Wild MD;  Location: [facility] ENDOSCOPY;  Service:     BRONCHOSCOPY Bilateral 4/24/2018    Procedure: BRONCHOSCOPY WITH WASHING;  Surgeon: Katharina Salter MD;  Location[facility] ENDOSCOPY;  Service: Pulmonary    BRONCHOSCOPY N/A 12/28/2019    Procedure: BRONCHOSCOPY with bilateral washing;  Surgeon: Katharina Salter MD;  Location[facility] ENDOSCOPY;  Service: Pulmonary    BRONCHOSCOPY Bilateral 1/4/2023    Procedure: BRONCHOSCOPY with lavage;  Surgeon: Katharina Salter MD;  Location: [facility] ENDOSCOPY;  Service: Pulmonary;  Laterality: Bilateral;  mucus plugging    CARDIAC CATHETERIZATION N/A 1/29/2023    Procedure: Left Heart Cath;  Surgeon: Johnnie Ang MD;  Location: [facility] CATH INVASIVE LOCATION;  Service: Cardiology;  Laterality: N/A;    CARDIAC CATHETERIZATION N/A 1/29/2023    Procedure: Coronary angiography;  Surgeon: Johnnie Ang MD;  Location: [facility] CATH INVASIVE LOCATION;  Service: Cardiology;  Laterality: N/A;    COLONOSCOPY  05/17/2013    eh, ih, tort, sig tics    COLONOSCOPY N/A 5/10/2019    Non-thrombosed external hemorrhoids found on perianal exam, Diverticulosis, Tortuous colon, One 5 mm polyp in the mid ascending colon, IH. Path: Tubular adenoma.     COLONOSCOPY N/A 9/24/2022    Procedure: COLONOSCOPY to cecum and TI with argon plasma coagulation.;  Surgeon: Bruce Black MD;  Location: [facility] ENDOSCOPY;  Service: Gastroenterology;  Laterality: N/A;  pre- GI bleeding  post- radiation proctitis, diverticulosis    ENDOSCOPY  03/19/2015    z line irreg, hh    ENDOSCOPY N/A 9/24/2022    Procedure: ESOPHAGOGASTRODUODENOSCOPY;  Surgeon: Bruce Black MD;  Location: [facility] ENDOSCOPY;  Service: Gastroenterology;  Laterality: N/A;  pre- GI bleeding  post- esophagitis, hiatal hernia    HIP OPEN REDUCTION Right 1/17/2018    Procedure: HIP OPEN REDUCTION INTERNAL FIXATION WITH DYNAMIC HIP SCREW;  Surgeon: Les Black MD;  Location: [facility] MAIN OR;  Service:     SPINE SURGERY       TONSILLECTOMY      TOTAL HIP ARTHROPLASTY REVISION Right 9/23/2019    Procedure: HIP  REVISION RIGHT;  Surgeon: Isauro Warner MD;  Location: [facility] OR;  Service: Orthopedics       FAMILY HISTORY  Family History   Problem Relation Age of Onset    Thyroid disease Mother     No Known Problems Father     Malig Hyperthermia Neg Hx        SOCIAL HISTORY  Social History     Socioeconomic History    Marital status: Single   Tobacco Use    Smoking status: Never    Smokeless tobacco: Never   Vaping Use    Vaping Use: Never used   Substance and Sexual Activity    Alcohol use: No     Comment: red wine occassional    Drug use: No    Sexual activity: Defer       ALLERGIES  Daliresp [roflumilast], Latex, and Sulfa antibiotics    The patient's allergies have been reviewed    REVIEW OF SYSTEMS  All systems reviewed and negative except for those discussed in HPI.     PHYSICAL EXAM  I have reviewed the triage vital signs and nursing notes.  ED Triage Vitals [10/18/23 1924]   Temp Heart Rate Resp BP SpO2   98.2 °F (36.8 °C) 70 18 134/58 98 %      Temp src Heart Rate Source Patient Position BP Location FiO2 (%)   Oral Monitor -- -- --       General: No acute distress.  HENT: NCAT, PERRL, Nares patent.  Eyes: no scleral icterus.  Neck: trachea midline, no ROM limitations.  CV: regular rhythm, regular rate.  Respiratory: normal effort, CTAB.  Abdomen: soft, nondistended, NTTP, no rebound tenderness, no guarding or rigidity.  Rectal exam performed, ED nurse present throughout as chaperone.  External exam is unremarkable, patient has greenish appearing stool on digital rectal exam, no gross blood, Hemoccult negative,  passed.  Musculoskeletal: no deformity.  Neuro: alert, moves all extremities, follows commands.  Skin: warm, dry.    LAB RESULTS  Recent Results (from the past 24 hour(s))   ECG 12 Lead Other; LE edema    Collection Time: 10/18/23  7:58 PM   Result Value Ref Range    QT Interval 422 ms    QTC Interval  439 ms   Comprehensive Metabolic Panel    Collection Time: 10/18/23  8:08 PM    Specimen: Blood   Result Value Ref Range    Glucose 98 65 - 99 mg/dL    BUN 18 8 - 23 mg/dL    Creatinine 0.92 0.76 - 1.27 mg/dL    Sodium 139 136 - 145 mmol/L    Potassium 3.9 3.5 - 5.2 mmol/L    Chloride 104 98 - 107 mmol/L    CO2 27.0 22.0 - 29.0 mmol/L    Calcium 9.3 8.6 - 10.5 mg/dL    Total Protein 6.2 6.0 - 8.5 g/dL    Albumin 4.2 3.5 - 5.2 g/dL    ALT (SGPT) 17 1 - 41 U/L    AST (SGOT) 16 1 - 40 U/L    Alkaline Phosphatase 57 39 - 117 U/L    Total Bilirubin 0.6 0.0 - 1.2 mg/dL    Globulin 2.0 gm/dL    A/G Ratio 2.1 g/dL    BUN/Creatinine Ratio 19.6 7.0 - 25.0    Anion Gap 8.0 5.0 - 15.0 mmol/L    eGFR 81.5 >60.0 mL/min/1.73   Protime-INR    Collection Time: 10/18/23  8:08 PM    Specimen: Blood   Result Value Ref Range    Protime 13.4 11.7 - 14.2 Seconds    INR 1.01 0.90 - 1.10   aPTT    Collection Time: 10/18/23  8:08 PM    Specimen: Blood   Result Value Ref Range    PTT 26.1 22.7 - 35.4 seconds   Type & Screen    Collection Time: 10/18/23  8:08 PM    Specimen: Blood   Result Value Ref Range    ABO Type A     RH type Positive     Antibody Screen Negative     T&S Expiration Date 10/21/2023 11:59:59 PM    BNP    Collection Time: 10/18/23  8:08 PM    Specimen: Blood   Result Value Ref Range    proBNP 380.0 0.0 - 1,800.0 pg/mL   Magnesium    Collection Time: 10/18/23  8:08 PM    Specimen: Blood   Result Value Ref Range    Magnesium 2.5 (H) 1.6 - 2.4 mg/dL   CBC Auto Differential    Collection Time: 10/18/23  8:08 PM    Specimen: Blood   Result Value Ref Range    WBC 4.95 3.40 - 10.80 10*3/mm3    RBC 4.42 4.14 - 5.80 10*6/mm3    Hemoglobin 11.4 (L) 13.0 - 17.7 g/dL    Hematocrit 35.3 (L) 37.5 - 51.0 %    MCV 79.9 79.0 - 97.0 fL    MCH 25.8 (L) 26.6 - 33.0 pg    MCHC 32.3 31.5 - 35.7 g/dL    RDW 15.5 (H) 12.3 - 15.4 %    RDW-SD 43.9 37.0 - 54.0 fl    MPV 8.8 6.0 - 12.0 fL    Platelets 218 140 - 450 10*3/mm3    Neutrophil % 58.2 42.7 -  76.0 %    Lymphocyte % 31.7 19.6 - 45.3 %    Monocyte % 9.7 5.0 - 12.0 %    Eosinophil % 0.0 (L) 0.3 - 6.2 %    Basophil % 0.2 0.0 - 1.5 %    Immature Grans % 0.2 0.0 - 0.5 %    Neutrophils, Absolute 2.88 1.70 - 7.00 10*3/mm3    Lymphocytes, Absolute 1.57 0.70 - 3.10 10*3/mm3    Monocytes, Absolute 0.48 0.10 - 0.90 10*3/mm3    Eosinophils, Absolute 0.00 0.00 - 0.40 10*3/mm3    Basophils, Absolute 0.01 0.00 - 0.20 10*3/mm3    Immature Grans, Absolute 0.01 0.00 - 0.05 10*3/mm3    nRBC 0.0 0.0 - 0.2 /100 WBC   Respiratory Panel PCR w/COVID-19(SARS-CoV-2) JARRETT/AMPARO/ROYAL/PAD/COR/MAD/[facility]ERMAIN In-House, NP Swab in [place], 3-4 HR TAT - Swab, Nasopharynx    Collection Time: 10/18/23  8:09 PM    Specimen: Nasopharynx; Swab   Result Value Ref Range    ADENOVIRUS, PCR Not Detected Not Detected    Coronavirus 229E Not Detected Not Detected    Coronavirus HKU1 Not Detected Not Detected    Coronavirus NL63 Not Detected Not Detected    Coronavirus OC43 Not Detected Not Detected    COVID19 Not Detected Not Detected - Ref. Range    Human Metapneumovirus Not Detected Not Detected    Human Rhinovirus/Enterovirus Not Detected Not Detected    Influenza A PCR Not Detected Not Detected    Influenza B PCR Not Detected Not Detected    Parainfluenza Virus 1 Not Detected Not Detected    Parainfluenza Virus 2 Not Detected Not Detected    Parainfluenza Virus 3 Not Detected Not Detected    Parainfluenza Virus 4 Not Detected Not Detected    RSV, PCR Not Detected Not Detected    Bordetella pertussis pcr Not Detected Not Detected    Bordetella parapertussis PCR Not Detected Not Detected    Chlamydophila pneumoniae PCR Not Detected Not Detected    Mycoplasma pneumo by PCR Not Detected Not Detected       I ordered the above labs and reviewed the results.    RADIOLOGY  CT Abdomen Pelvis Without Contrast    Result Date: 10/18/2023  CT OF THE ABDOMEN PELVIS WITHOUT CONTRAST  HISTORY: Abdominal pain. Dark stool.  COMPARISON: July 5, 2023  TECHNIQUE: Axial CT  imaging was obtained through the abdomen and pelvis. No IV contrast was administered.  FINDINGS: Images through the lung bases demonstrate bibasilar scarring. No suspicious hepatic lesions are seen. There are coronary artery calcifications. There is a small to moderate hiatal hernia. Calcified granulomata are noted within the spleen. The duodenum, pancreas, and adrenal glands are normal. No hydronephrosis is seen on either side. There are aortoiliac calcifications. Full assessment of structures within the pelvis is degraded by streak artifact from a left hip arthroplasty. Prostate gland contains dystrophic calcifications. There is colonic diverticulosis. There is no bowel obstruction. There is a fat-containing umbilical hernia. There is no evidence of appendicitis. There is lumbar scoliosis. Multiple compression deformities are noted at L3, L1, and T12. There is also deformity of the superior endplate of T11. These findings are stable when compared to prior study. Patient is noted to have old bilateral inferior pubic rami fractures and old bilateral superior pubic rami fractures.       1. Colonic diverticulosis. 2. Small to moderate hiatal hernia.  Radiation dose reduction techniques were utilized, including automated exposure control and exposure modulation based on body size.   This report was finalized on 10/18/2023 9:35 PM by Dr. Danielle Arango M.D on Workstation: [id number]      XR Chest 1 View    Result Date: 10/18/2023  XR CHEST 1 VW-10/18/2023  HISTORY: Lower extremity edema.  Heart size is within normal limits. Lungs appear clear. A skinfold overlies the right and left lung. A cervical fusion plate is seen. Bones and soft tissues are otherwise unremarkable.      1. No acute process  This report was finalized on 10/18/2023 9:08 PM by Dr. Roberto Fernandes M.D on Workstation: [id number]       I ordered the above noted radiological studies. I reviewed the images and results. I agree with the radiologist  interpretation.    PROCEDURES  Procedures    MEDICATIONS GIVEN IN ER  Medications   furosemide (LASIX) injection 40 mg (has no administration in time range)   ondansetron (ZOFRAN) injection 8 mg (8 mg Intravenous Given 10/18/23 2134)       PROGRESS, DATA ANALYSIS, CONSULTS, AND MEDICAL DECISION MAKING  A complete history and physical exam have been performed.  All available laboratory and imaging results have been reviewed by myself prior to disposition.    MDM    After the initial H&P, I discussed pertinent information from history and physical exam with patient/family.  Discussed differential diagnosis.  Discussed plan for ED evaluation/workup/treatment.  All questions answered.  Patient/family is agreeable with plan.  ED Course as of 10/18/23 2146   Wed Oct 18, 2023   2012 EKG independently viewed and contemporaneously interpreted by ED physician. Time: 7:58 PM.  Rate 65.  Interpretation: Normal sinus rhythm, normal axis, normal QRS, no acute ST changes. [JG]   2142 I reviewed chest x-ray in PACS, no pulmonary infiltrates per my read. [JG]   2142 I reviewed CT abdomen pelvis in PACS, no bowel obstruction per my read. [JG]   2143 Patient complained of dark stool, Hemoccult negative, hemoglobin 11.4, appears to be patient's baseline, vital signs normal.  Patient complained of abdominal pain, benign abdominal exam, CT imaging unremarkable, liver enzymes bilirubin and lipase are normal and reassuring.  Patient complained of lower extremity swelling, does have trace swelling but no signs of pulmonary edema, renal function is normal.  Giving Lasix.  Patient complained of cough, afebrile, no pneumonia, negative on respiratory viral panel.  Patient is well-appearing appears appropriate for discharge with outpatient follow-up. [JG]   2143 Reassessed, discussed ED work-up and results, discussed plan for discharge, need for close follow-up with primary care.  Given extensive discussion return precautions, discharging. [JG]       ED Course User Index  [JG] Gregory Martin MD       AS OF 21:46 EDT VITALS:    BP - 134/58  HR - 70  TEMP - 98.2 °F (36.8 °C) (Oral)  O2 SATS - 98%    DIAGNOSIS  Final diagnoses:   Lower abdominal pain   Dark stools   Lower extremity edema   Acute cough         DISPOSITION  DISCHARGE    Patient discharged in stable condition.    Reviewed implications of results, diagnosis, meds, responsibility to follow up, warning signs and symptoms of possible worsening, potential complications and reasons to return to ER.    Patient/Family voiced understanding of above instructions.    Discussed plan for discharge, as there is no emergent indication for admission. Patient referred to primary care provider for BP management due to today's BP. Pt/family is agreeable and understands need for follow up and repeat testing.  Pt is aware that discharge does not mean that nothing is wrong but it indicates no emergency is present that requires admission and they must continue care with follow-up as given below or physician of their choice.     FOLLOW-UP  Harpreet Kate MD  [address] [id number] [phone]    Schedule an appointment as soon as possible for a visit in 2 days  even if well         Medication List      No changes were made to your prescriptions during this visit.            Gregory Martin MD  10/18/23 1230

## 2023-10-18 NOTE — ED NOTES
Patient arrived to ED via EMS stretcher from home with complaints of dark stools, abdominal pain, and BLE swelling x2 days.  Patient denies use of blood thinners.  Patient AA&Ox4 at triage

## 2023-10-19 ENCOUNTER — OFFICE VISIT (OUTPATIENT)
Dept: FAMILY MEDICINE CLINIC | Facility: CLINIC | Age: 83
End: 2023-10-19
Payer: MEDICARE

## 2023-10-19 VITALS
WEIGHT: 186 LBS | HEART RATE: 88 BPM | DIASTOLIC BLOOD PRESSURE: 64 MMHG | OXYGEN SATURATION: 98 % | HEIGHT: 71 IN | SYSTOLIC BLOOD PRESSURE: 110 MMHG | BODY MASS INDEX: 26.04 KG/M2

## 2023-10-19 DIAGNOSIS — I48.0 PAROXYSMAL ATRIAL FIBRILLATION: ICD-10-CM

## 2023-10-19 DIAGNOSIS — K29.00 ACUTE GASTRITIS WITHOUT HEMORRHAGE, UNSPECIFIED GASTRITIS TYPE: Primary | ICD-10-CM

## 2023-10-19 LAB
QT INTERVAL: 422 MS
QTC INTERVAL: 439 MS

## 2023-10-19 PROCEDURE — 99214 OFFICE O/P EST MOD 30 MIN: CPT | Performed by: STUDENT IN AN ORGANIZED HEALTH CARE EDUCATION/TRAINING PROGRAM

## 2023-10-19 PROCEDURE — 1160F RVW MEDS BY RX/DR IN RCRD: CPT | Performed by: STUDENT IN AN ORGANIZED HEALTH CARE EDUCATION/TRAINING PROGRAM

## 2023-10-19 PROCEDURE — 1159F MED LIST DOCD IN RCRD: CPT | Performed by: STUDENT IN AN ORGANIZED HEALTH CARE EDUCATION/TRAINING PROGRAM

## 2023-10-19 PROCEDURE — 3078F DIAST BP <80 MM HG: CPT | Performed by: STUDENT IN AN ORGANIZED HEALTH CARE EDUCATION/TRAINING PROGRAM

## 2023-10-19 PROCEDURE — 3074F SYST BP LT 130 MM HG: CPT | Performed by: STUDENT IN AN ORGANIZED HEALTH CARE EDUCATION/TRAINING PROGRAM

## 2023-10-19 NOTE — PROGRESS NOTES
"Chief Complaint  Abdominal Pain and Follow-up    Subjective        Tony Leonard presents to [facility]  History of Present Illness  For abdominal cramps along with black loose stool 3-4 times a a day. Pt stated he went to ER yesterday and his tool were checked for blood which came out negative and repeat hemoglobin showed no drop in hemoglobin  and then he was discharged back home with prescription of dicyclomine which pt has not yet started .  Pt has picked that prescription today from pharmacy.  Pt stated today he is not feeling that bad as yesterday but still can feel burning pain in upper abdomen.  Pt stated yesterday he took peptobismol in addition to his regular daily protonix that seemed to help.  Abdominal Pain        Objective   Vital Signs:  /64   Pulse 88   Ht 180.3 cm (70.98\")   Wt 84.4 kg (186 lb)   SpO2 98%   BMI 25.95 kg/m²   Estimated body mass index is 25.95 kg/m² as calculated from the following:    Height as of this encounter: 180.3 cm (70.98\").    Weight as of this encounter: 84.4 kg (186 lb).               Physical Exam  HENT:      Mouth/Throat:      Mouth: Mucous membranes are moist.      Pharynx: Oropharynx is clear.   Pulmonary:      Effort: Pulmonary effort is normal. No respiratory distress.      Breath sounds: Rhonchi present. No wheezing.   Abdominal:      General: Bowel sounds are normal.      Palpations: Abdomen is soft.      Tenderness: There is abdominal tenderness.      Comments: Epigastric tenderness appreciated.   Musculoskeletal:      Cervical back: Normal range of motion.      Comments: Ambulates with help of walker   Neurological:      Mental Status: He is alert and oriented to person, place, and time.   Psychiatric:         Mood and Affect: Mood normal.        Result Review :  The following data was reviewed by: Harpreet Kate MD on 10/19/2023:  CMP          7/11/2023    21:10 10/17/2023    10:01 10/18/2023    20:08   CMP   Glucose " 92  88  98    BUN 19  22  18    Creatinine 0.94  1.07  0.92    EGFR 79.4   81.5    Sodium 140  141  139    Potassium 4.1  4.3  3.9    Chloride 108  106  104    Calcium 9.2  9.2  9.3    Total Protein  6.3     Total Protein 6.1   6.2    Albumin 3.9  4.3  4.2    Globulin  2.0     Globulin 2.2   2.0    Total Bilirubin 0.4  0.4  0.6    Alkaline Phosphatase 57  58  57    AST (SGOT) 15  16  16    ALT (SGPT) 17  15  17    Albumin/Globulin Ratio 1.8   2.1    BUN/Creatinine Ratio 20.2  20.6  19.6    Anion Gap 8.0   8.0      CBC          7/11/2023    21:10 10/17/2023    10:01 10/18/2023    20:08   CBC   WBC 5.06  5.57  4.95    RBC 4.01  4.28  4.42    Hemoglobin 11.0  11.2  11.4    Hematocrit 33.6  34.8  35.3    MCV 83.8  81.3  79.9    MCH 27.4  26.2  25.8    MCHC 32.7  32.2  32.3    RDW 14.5  15.6  15.5    Platelets 310  232  218      Lipid Panel          10/17/2023    10:01   Lipid Panel   Total Cholesterol 233    Triglycerides 60    HDL Cholesterol 69    VLDL Cholesterol 10    LDL Cholesterol  154      TSH          10/17/2023    10:01   TSH   TSH 3.310                   Assessment and Plan   Diagnoses and all orders for this visit:    1. Acute gastritis without hemorrhage, unspecified gastritis type (Primary)    2. Paroxysmal atrial fibrillation    # Gastritis  Advise pt to take one additional dose of protonix at night time also for 2-3 days. Can also use tums in addition if still epigastric burning present.  Rtc if problem persist or worsens.    # Paroxysmal Atrial fibrillation  Pt stopped eliquis by itself  Pt is informed that black stool color can be related to what he is eating since occult blood done yesterday in ER came out negative for blood and no drop in hemoglobin noticed so he should resume his eliquis   Pt agreed and showed verbalized understanding.    RTC in 6 months for wellness checkup with labs       Follow Up   No follow-ups on file.  Patient was given instructions and counseling regarding his condition or  for health maintenance advice. Please see specific information pulled into the AVS if appropriate.

## 2023-10-21 NOTE — NURSING NOTE
At 2100 gave pt sputum specimen cup. Pt said he will be able to give a specimen before the morning.   On call telephone call. Pt c/o head congestion, sore throat, cough and myalgias. She is negative for Covid. Push fluids. OK to use Tylenol for aches and pains. OK to use Mucinex or Robitussin for cough. I will send a prescription for Zpak to her pharmacy and will refill her Proair inhaler.

## 2023-10-26 ENCOUNTER — TELEPHONE (OUTPATIENT)
Dept: GASTROENTEROLOGY | Facility: CLINIC | Age: 83
End: 2023-10-26
Payer: MEDICARE

## 2023-10-26 NOTE — TELEPHONE ENCOUNTER
PT CAME INTO THE OFFICE AND SAID THAT HE NEEDS TO GET HIS PANTOPRAZOLE REFILLED AND HE WANTS IT S[facility] [address]  HE TRIED CALLING AND COULDN'T GET THROUGH.  WILL YOU CALL HIM WHEN YOU GET IT CALLED IN.  [phone]

## 2023-10-26 NOTE — TELEPHONE ENCOUNTER
Called pt and on vm advised pt that in Sept we had sent in his pantoprazole with a year's worth of refills to [facility]. Advised pt we are checking to see if the needs this sent to [facility].  Advised pt to call back.    Hub can take response.

## 2023-10-30 ENCOUNTER — PATIENT OUTREACH (OUTPATIENT)
Dept: CASE MANAGEMENT | Facility: OTHER | Age: 83
End: 2023-10-30
Payer: MEDICARE

## 2023-10-30 NOTE — OUTREACH NOTE
"AMBULATORY CASE MANAGEMENT NOTE    Name and Relationship of Patient/Support Person: Tony Leonard - Self  Self    Patient Outreach    Outgoing call to the patient.  Introduced self and explained purpose of outreach to follow up with patient for recent ED visit and PCP visit and to follow up with progress.  Had left VM for patient x 2 previously.  Patient declined attempt to review medications and compliance.  States \"he needed to go\".  Scheduled a monthly chart review x 3.     Azeb PHAM  Ambulatory Case Management    10/30/2023, 16:02 EDT  "

## 2023-12-08 DIAGNOSIS — G89.29 CHRONIC LOW BACK PAIN WITH SCIATICA, SCIATICA LATERALITY UNSPECIFIED, UNSPECIFIED BACK PAIN LATERALITY: ICD-10-CM

## 2023-12-08 DIAGNOSIS — M54.40 CHRONIC LOW BACK PAIN WITH SCIATICA, SCIATICA LATERALITY UNSPECIFIED, UNSPECIFIED BACK PAIN LATERALITY: ICD-10-CM

## 2023-12-08 RX ORDER — GABAPENTIN 300 MG/1
300 CAPSULE ORAL 3 TIMES DAILY
Qty: 270 CAPSULE | Refills: 1 | Status: SHIPPED | OUTPATIENT
Start: 2023-12-08

## 2023-12-08 NOTE — TELEPHONE ENCOUNTER
Caller: Horacio Tony OSCAR    Relationship: Self    Best call back number: [phone]     Requested Prescriptions:   Requested Prescriptions     Pending Prescriptions Disp Refills    gabapentin (NEURONTIN) 300 MG capsule 270 capsule 1     Sig: Take 1 capsule by mouth 3 (Three) Times a Day.        Pharmacy where request should be sent: [facility] [id number] [address] AT [facility] ([facility] [phone] [facility] [phone] FX     Last office visit with prescribing clinician: 10/19/2023   Last telemedicine visit with prescribing clinician: Visit date not found   Next office visit with prescribing clinician: 4/23/2024       Does the patient have less than a 3 day supply:  [x] Yes  [] No      Cherelle Goss Rep   12/08/23 13:33 EST

## 2023-12-08 NOTE — TELEPHONE ENCOUNTER
Rx Refill Note  Requested Prescriptions     Pending Prescriptions Disp Refills    gabapentin (NEURONTIN) 300 MG capsule 270 capsule 1     Sig: Take 1 capsule by mouth 3 (Three) Times a Day.      Last office visit with prescribing clinician: 10/19/2023   Last telemedicine visit with prescribing clinician: Visit date not found   Next office visit with prescribing clinician: 4/23/2024                         Would you like a call back once the refill request has been completed: [] Yes [] No    If the office needs to give you a call back, can they leave a voicemail: [] Yes [] No    Cherelle Hansen Rep  12/08/23, 14:33 EST

## 2023-12-21 ENCOUNTER — HOSPITAL ENCOUNTER (OUTPATIENT)
Facility: HOSPITAL | Age: 83
Setting detail: OBSERVATION
Discharge: HOME | End: 2023-12-22
Attending: EMERGENCY MEDICINE | Admitting: HOSPITALIST
Payer: MEDICARE

## 2023-12-21 DIAGNOSIS — I10 PRIMARY HYPERTENSION: ICD-10-CM

## 2023-12-21 DIAGNOSIS — Z09 HOSPITAL DISCHARGE FOLLOW-UP: ICD-10-CM

## 2023-12-21 DIAGNOSIS — E66.2 OBESITY WITH ALVEOLAR HYPOVENTILATION AND SERIOUS COMORBIDITY, UNSPECIFIED CLASSIFICATION: ICD-10-CM

## 2023-12-21 DIAGNOSIS — I26.92 SADDLE EMBOLUS OF PULMONARY ARTERY, UNSPECIFIED CHRONICITY, UNSPECIFIED WHETHER ACUTE COR PULMONALE PRESENT: ICD-10-CM

## 2023-12-21 DIAGNOSIS — J02.9 SORE THROAT: Primary | ICD-10-CM

## 2023-12-21 DIAGNOSIS — I50.32 CHRONIC DIASTOLIC CONGESTIVE HEART FAILURE: ICD-10-CM

## 2023-12-21 DIAGNOSIS — I71.40 ABDOMINAL AORTIC ANEURYSM (AAA) WITHOUT RUPTURE, UNSPECIFIED PART: ICD-10-CM

## 2023-12-21 DIAGNOSIS — R79.89 ELEVATED TROPONIN: ICD-10-CM

## 2023-12-21 LAB
B PARAPERT DNA SPEC QL NAA+PROBE: NOT DETECTED
B PERT DNA SPEC QL NAA+PROBE: NOT DETECTED
BASOPHILS # BLD AUTO: 0.05 10*3/MM3 (ref 0–0.2)
BASOPHILS NFR BLD AUTO: 0.7 % (ref 0–1.5)
C PNEUM DNA NPH QL NAA+NON-PROBE: NOT DETECTED
DEPRECATED RDW RBC AUTO: 48.1 FL (ref 37–54)
EOSINOPHIL # BLD AUTO: 0 10*3/MM3 (ref 0–0.4)
EOSINOPHIL NFR BLD AUTO: 0 % (ref 0.3–6.2)
ERYTHROCYTE [DISTWIDTH] IN BLOOD BY AUTOMATED COUNT: 16.4 % (ref 12.3–15.4)
FLUAV SUBTYP SPEC NAA+PROBE: NOT DETECTED
FLUBV RNA ISLT QL NAA+PROBE: NOT DETECTED
HADV DNA SPEC NAA+PROBE: NOT DETECTED
HCOV 229E RNA SPEC QL NAA+PROBE: NOT DETECTED
HCOV HKU1 RNA SPEC QL NAA+PROBE: NOT DETECTED
HCOV NL63 RNA SPEC QL NAA+PROBE: NOT DETECTED
HCOV OC43 RNA SPEC QL NAA+PROBE: NOT DETECTED
HCT VFR BLD AUTO: 34.8 % (ref 37.5–51)
HGB BLD-MCNC: 11.3 G/DL (ref 13–17.7)
HMPV RNA NPH QL NAA+NON-PROBE: NOT DETECTED
HPIV1 RNA ISLT QL NAA+PROBE: NOT DETECTED
HPIV2 RNA SPEC QL NAA+PROBE: NOT DETECTED
HPIV3 RNA NPH QL NAA+PROBE: NOT DETECTED
HPIV4 P GENE NPH QL NAA+PROBE: NOT DETECTED
IMM GRANULOCYTES # BLD AUTO: 0.01 10*3/MM3 (ref 0–0.05)
IMM GRANULOCYTES NFR BLD AUTO: 0.1 % (ref 0–0.5)
LYMPHOCYTES # BLD AUTO: 2.06 10*3/MM3 (ref 0.7–3.1)
LYMPHOCYTES NFR BLD AUTO: 30.9 % (ref 19.6–45.3)
M PNEUMO IGG SER IA-ACNC: NOT DETECTED
MCH RBC QN AUTO: 26.9 PG (ref 26.6–33)
MCHC RBC AUTO-ENTMCNC: 32.5 G/DL (ref 31.5–35.7)
MCV RBC AUTO: 82.9 FL (ref 79–97)
MONOCYTES # BLD AUTO: 0.75 10*3/MM3 (ref 0.1–0.9)
MONOCYTES NFR BLD AUTO: 11.2 % (ref 5–12)
NEUTROPHILS NFR BLD AUTO: 3.8 10*3/MM3 (ref 1.7–7)
NEUTROPHILS NFR BLD AUTO: 57.1 % (ref 42.7–76)
NRBC BLD AUTO-RTO: 0 /100 WBC (ref 0–0.2)
PLATELET # BLD AUTO: 229 10*3/MM3 (ref 140–450)
PMV BLD AUTO: 9.6 FL (ref 6–12)
RBC # BLD AUTO: 4.2 10*6/MM3 (ref 4.14–5.8)
RHINOVIRUS RNA SPEC NAA+PROBE: NOT DETECTED
RSV RNA NPH QL NAA+NON-PROBE: NOT DETECTED
S PYO AG THROAT QL: NEGATIVE
SARS-COV-2 RNA NPH QL NAA+NON-PROBE: NOT DETECTED
WBC NRBC COR # BLD AUTO: 6.67 10*3/MM3 (ref 3.4–10.8)

## 2023-12-21 PROCEDURE — 87880 STREP A ASSAY W/OPTIC: CPT | Performed by: PHYSICIAN ASSISTANT

## 2023-12-21 PROCEDURE — 85025 COMPLETE CBC W/AUTO DIFF WBC: CPT | Performed by: PHYSICIAN ASSISTANT

## 2023-12-21 PROCEDURE — 0202U NFCT DS 22 TRGT SARS-COV-2: CPT | Performed by: PHYSICIAN ASSISTANT

## 2023-12-21 PROCEDURE — 84484 ASSAY OF TROPONIN QUANT: CPT | Performed by: PHYSICIAN ASSISTANT

## 2023-12-21 PROCEDURE — 93005 ELECTROCARDIOGRAM TRACING: CPT | Performed by: PHYSICIAN ASSISTANT

## 2023-12-21 PROCEDURE — 99284 EMERGENCY DEPT VISIT MOD MDM: CPT

## 2023-12-21 PROCEDURE — 87081 CULTURE SCREEN ONLY: CPT | Performed by: PHYSICIAN ASSISTANT

## 2023-12-21 PROCEDURE — 36415 COLL VENOUS BLD VENIPUNCTURE: CPT

## 2023-12-21 PROCEDURE — 80053 COMPREHEN METABOLIC PANEL: CPT | Performed by: PHYSICIAN ASSISTANT

## 2023-12-21 PROCEDURE — 93010 ELECTROCARDIOGRAM REPORT: CPT | Performed by: INTERNAL MEDICINE

## 2023-12-22 ENCOUNTER — APPOINTMENT (OUTPATIENT)
Dept: NUCLEAR MEDICINE | Facility: HOSPITAL | Age: 83
End: 2023-12-22
Payer: MEDICARE

## 2023-12-22 ENCOUNTER — READMISSION MANAGEMENT (OUTPATIENT)
Dept: CALL CENTER | Facility: HOSPITAL | Age: 83
End: 2023-12-22
Payer: MEDICARE

## 2023-12-22 ENCOUNTER — APPOINTMENT (OUTPATIENT)
Dept: GENERAL RADIOLOGY | Facility: HOSPITAL | Age: 83
End: 2023-12-22
Payer: MEDICARE

## 2023-12-22 ENCOUNTER — APPOINTMENT (OUTPATIENT)
Dept: CARDIOLOGY | Facility: HOSPITAL | Age: 83
End: 2023-12-22
Payer: MEDICARE

## 2023-12-22 VITALS
BODY MASS INDEX: 23.1 KG/M2 | TEMPERATURE: 97.4 F | HEART RATE: 85 BPM | HEIGHT: 71 IN | SYSTOLIC BLOOD PRESSURE: 142 MMHG | WEIGHT: 165 LBS | OXYGEN SATURATION: 97 % | RESPIRATION RATE: 18 BRPM | DIASTOLIC BLOOD PRESSURE: 74 MMHG

## 2023-12-22 PROBLEM — R79.89 ELEVATED TROPONIN: Status: ACTIVE | Noted: 2023-12-22

## 2023-12-22 LAB
ALBUMIN SERPL-MCNC: 4 G/DL (ref 3.5–5.2)
ALBUMIN/GLOB SERPL: 1.7 G/DL
ALP SERPL-CCNC: 60 U/L (ref 39–117)
ALT SERPL W P-5'-P-CCNC: 15 U/L (ref 1–41)
ANION GAP SERPL CALCULATED.3IONS-SCNC: 10.5 MMOL/L (ref 5–15)
ANION GAP SERPL CALCULATED.3IONS-SCNC: 11.2 MMOL/L (ref 5–15)
AORTIC DIMENSIONLESS INDEX: 0.5 (DI)
ASCENDING AORTA: 4.5 CM
AST SERPL-CCNC: 19 U/L (ref 1–40)
BH CV ECHO MEAS - ACS: 1.82 CM
BH CV ECHO MEAS - AO MAX PG: 12.6 MMHG
BH CV ECHO MEAS - AO MEAN PG: 6.9 MMHG
BH CV ECHO MEAS - AO ROOT DIAM: 3.7 CM
BH CV ECHO MEAS - AO V2 MAX: 177.1 CM/SEC
BH CV ECHO MEAS - AO V2 VTI: 42.9 CM
BH CV ECHO MEAS - AVA(I,D): 1.92 CM2
BH CV ECHO MEAS - EDV(CUBED): 82 ML
BH CV ECHO MEAS - EDV(MOD-SP2): 100 ML
BH CV ECHO MEAS - EDV(MOD-SP4): 99 ML
BH CV ECHO MEAS - EF(MOD-BP): 58.9 %
BH CV ECHO MEAS - EF(MOD-SP2): 61 %
BH CV ECHO MEAS - EF(MOD-SP4): 55.6 %
BH CV ECHO MEAS - ESV(CUBED): 27.3 ML
BH CV ECHO MEAS - ESV(MOD-SP2): 39 ML
BH CV ECHO MEAS - ESV(MOD-SP4): 44 ML
BH CV ECHO MEAS - FS: 30.7 %
BH CV ECHO MEAS - IVS/LVPW: 1.07 CM
BH CV ECHO MEAS - IVSD: 0.84 CM
BH CV ECHO MEAS - LAT PEAK E' VEL: 12.4 CM/SEC
BH CV ECHO MEAS - LV DIASTOLIC VOL/BSA (35-75): 50.9 CM2
BH CV ECHO MEAS - LV MASS(C)D: 108.9 GRAMS
BH CV ECHO MEAS - LV MAX PG: 4.1 MMHG
BH CV ECHO MEAS - LV MEAN PG: 1.9 MMHG
BH CV ECHO MEAS - LV SYSTOLIC VOL/BSA (12-30): 22.6 CM2
BH CV ECHO MEAS - LV V1 MAX: 101.1 CM/SEC
BH CV ECHO MEAS - LV V1 VTI: 22.7 CM
BH CV ECHO MEAS - LVIDD: 4.3 CM
BH CV ECHO MEAS - LVIDS: 3 CM
BH CV ECHO MEAS - LVOT AREA: 3.6 CM2
BH CV ECHO MEAS - LVOT DIAM: 2.15 CM
BH CV ECHO MEAS - LVPWD: 0.78 CM
BH CV ECHO MEAS - MED PEAK E' VEL: 11 CM/SEC
BH CV ECHO MEAS - MV A DUR: 0.17 SEC
BH CV ECHO MEAS - MV A MAX VEL: 84.8 CM/SEC
BH CV ECHO MEAS - MV DEC SLOPE: 266.2 CM/SEC2
BH CV ECHO MEAS - MV DEC TIME: 0.22 SEC
BH CV ECHO MEAS - MV E MAX VEL: 80.6 CM/SEC
BH CV ECHO MEAS - MV E/A: 0.95
BH CV ECHO MEAS - MV MAX PG: 3.6 MMHG
BH CV ECHO MEAS - MV MEAN PG: 1.51 MMHG
BH CV ECHO MEAS - MV P1/2T: 110.5 MSEC
BH CV ECHO MEAS - MV V2 VTI: 34.7 CM
BH CV ECHO MEAS - MVA(P1/2T): 1.99 CM2
BH CV ECHO MEAS - MVA(VTI): 2.37 CM2
BH CV ECHO MEAS - PA V2 MAX: 80.9 CM/SEC
BH CV ECHO MEAS - QP/QS: 0.49
BH CV ECHO MEAS - RAP SYSTOLE: 3 MMHG
BH CV ECHO MEAS - RV MAX PG: 1.81 MMHG
BH CV ECHO MEAS - RV V1 MAX: 67.3 CM/SEC
BH CV ECHO MEAS - RV V1 VTI: 12.9 CM
BH CV ECHO MEAS - RVOT DIAM: 2 CM
BH CV ECHO MEAS - SI(MOD-SP2): 31.4 ML/M2
BH CV ECHO MEAS - SI(MOD-SP4): 28.3 ML/M2
BH CV ECHO MEAS - SV(LVOT): 82.4 ML
BH CV ECHO MEAS - SV(MOD-SP2): 61 ML
BH CV ECHO MEAS - SV(MOD-SP4): 55 ML
BH CV ECHO MEAS - SV(RVOT): 40.4 ML
BH CV ECHO MEAS - TAPSE (>1.6): 3 CM
BH CV ECHO MEASUREMENTS AVERAGE E/E' RATIO: 6.89
BH CV ECHO SHUNT ASSESSMENT PERFORMED (HIDDEN SCRIPTING): 1
BH CV REST NUCLEAR ISOTOPE DOSE: 11.5 MCI
BH CV STRESS COMMENTS STAGE 1: NORMAL
BH CV STRESS DOSE REGADENOSON STAGE 1: 0.4
BH CV STRESS DURATION MIN STAGE 1: 0
BH CV STRESS DURATION SEC STAGE 1: 10
BH CV STRESS NUCLEAR ISOTOPE DOSE: 35.2 MCI
BH CV STRESS PROTOCOL 1: NORMAL
BH CV STRESS RECOVERY BP: NORMAL MMHG
BH CV STRESS RECOVERY HR: 79 BPM
BH CV STRESS STAGE 1: 1
BH CV VAS BP RIGHT ARM: NORMAL MMHG
BH CV XLRA - RV BASE: 4.1 CM
BH CV XLRA - RV LENGTH: 8.1 CM
BH CV XLRA - RV MID: 3 CM
BH CV XLRA - TDI S': 12.4 CM/SEC
BILIRUB SERPL-MCNC: 0.2 MG/DL (ref 0–1.2)
BUN SERPL-MCNC: 20 MG/DL (ref 8–23)
BUN SERPL-MCNC: 24 MG/DL (ref 8–23)
BUN/CREAT SERPL: 21.1 (ref 7–25)
BUN/CREAT SERPL: 24.7 (ref 7–25)
CALCIUM SPEC-SCNC: 9 MG/DL (ref 8.6–10.5)
CALCIUM SPEC-SCNC: 9.1 MG/DL (ref 8.6–10.5)
CHLORIDE SERPL-SCNC: 107 MMOL/L (ref 98–107)
CHLORIDE SERPL-SCNC: 107 MMOL/L (ref 98–107)
CO2 SERPL-SCNC: 21.8 MMOL/L (ref 22–29)
CO2 SERPL-SCNC: 24.5 MMOL/L (ref 22–29)
CREAT SERPL-MCNC: 0.95 MG/DL (ref 0.76–1.27)
CREAT SERPL-MCNC: 0.97 MG/DL (ref 0.76–1.27)
DEPRECATED RDW RBC AUTO: 46 FL (ref 37–54)
EGFRCR SERPLBLD CKD-EPI 2021: 76.5 ML/MIN/1.73
EGFRCR SERPLBLD CKD-EPI 2021: 78.4 ML/MIN/1.73
ERYTHROCYTE [DISTWIDTH] IN BLOOD BY AUTOMATED COUNT: 15.9 % (ref 12.3–15.4)
GEN 5 2HR TROPONIN T REFLEX: 81 NG/L
GLOBULIN UR ELPH-MCNC: 2.4 GM/DL
GLUCOSE SERPL-MCNC: 102 MG/DL (ref 65–99)
GLUCOSE SERPL-MCNC: 85 MG/DL (ref 65–99)
HCT VFR BLD AUTO: 34 % (ref 37.5–51)
HGB BLD-MCNC: 10.6 G/DL (ref 13–17.7)
LV EF NUC BP: 60 %
MAXIMAL PREDICTED HEART RATE: 135 BPM
MCH RBC QN AUTO: 25.2 PG (ref 26.6–33)
MCHC RBC AUTO-ENTMCNC: 31.2 G/DL (ref 31.5–35.7)
MCV RBC AUTO: 80.8 FL (ref 79–97)
PERCENT MAX PREDICTED HR: 68.15 %
PLATELET # BLD AUTO: 240 10*3/MM3 (ref 140–450)
PMV BLD AUTO: 9.6 FL (ref 6–12)
POTASSIUM SERPL-SCNC: 3.6 MMOL/L (ref 3.5–5.2)
POTASSIUM SERPL-SCNC: 4.1 MMOL/L (ref 3.5–5.2)
PROT SERPL-MCNC: 6.4 G/DL (ref 6–8.5)
QT INTERVAL: 438 MS
QTC INTERVAL: 442 MS
RBC # BLD AUTO: 4.21 10*6/MM3 (ref 4.14–5.8)
SINUS: 3.6 CM
SODIUM SERPL-SCNC: 140 MMOL/L (ref 136–145)
SODIUM SERPL-SCNC: 142 MMOL/L (ref 136–145)
STJ: 3.3 CM
STRESS BASELINE BP: NORMAL MMHG
STRESS BASELINE HR: 57 BPM
STRESS PERCENT HR: 80 %
STRESS POST PEAK BP: NORMAL MMHG
STRESS POST PEAK HR: 92 BPM
STRESS TARGET HR: 115 BPM
TROPONIN T DELTA: 16 NG/L
TROPONIN T SERPL HS-MCNC: 59 NG/L
TROPONIN T SERPL HS-MCNC: 65 NG/L
WBC NRBC COR # BLD AUTO: 6.53 10*3/MM3 (ref 3.4–10.8)

## 2023-12-22 PROCEDURE — 94640 AIRWAY INHALATION TREATMENT: CPT

## 2023-12-22 PROCEDURE — G0378 HOSPITAL OBSERVATION PER HR: HCPCS

## 2023-12-22 PROCEDURE — 63710000001 FLUTICASONE 50 MCG/ACT SUSPENSION 16 G BOTTLE: Performed by: HOSPITALIST

## 2023-12-22 PROCEDURE — 99214 OFFICE O/P EST MOD 30 MIN: CPT | Performed by: INTERNAL MEDICINE

## 2023-12-22 PROCEDURE — A9270 NON-COVERED ITEM OR SERVICE: HCPCS | Performed by: HOSPITALIST

## 2023-12-22 PROCEDURE — 63710000001 GABAPENTIN 300 MG CAPSULE: Performed by: HOSPITALIST

## 2023-12-22 PROCEDURE — 78452 HT MUSCLE IMAGE SPECT MULT: CPT | Performed by: INTERNAL MEDICINE

## 2023-12-22 PROCEDURE — 94761 N-INVAS EAR/PLS OXIMETRY MLT: CPT

## 2023-12-22 PROCEDURE — 71045 X-RAY EXAM CHEST 1 VIEW: CPT

## 2023-12-22 PROCEDURE — 80048 BASIC METABOLIC PNL TOTAL CA: CPT | Performed by: NURSE PRACTITIONER

## 2023-12-22 PROCEDURE — 93018 CV STRESS TEST I&R ONLY: CPT | Performed by: INTERNAL MEDICINE

## 2023-12-22 PROCEDURE — 36415 COLL VENOUS BLD VENIPUNCTURE: CPT | Performed by: NURSE PRACTITIONER

## 2023-12-22 PROCEDURE — 84484 ASSAY OF TROPONIN QUANT: CPT | Performed by: PHYSICIAN ASSISTANT

## 2023-12-22 PROCEDURE — 93017 CV STRESS TEST TRACING ONLY: CPT

## 2023-12-22 PROCEDURE — 0 TECHNETIUM SESTAMIBI: Performed by: HOSPITALIST

## 2023-12-22 PROCEDURE — 63710000001 ASPIRIN 81 MG CHEWABLE TABLET: Performed by: HOSPITALIST

## 2023-12-22 PROCEDURE — 85027 COMPLETE CBC AUTOMATED: CPT | Performed by: NURSE PRACTITIONER

## 2023-12-22 PROCEDURE — 63710000001 POTASSIUM CHLORIDE 10 MEQ TABLET CONTROLLED-RELEASE: Performed by: HOSPITALIST

## 2023-12-22 PROCEDURE — 63710000001 FUROSEMIDE 40 MG TABLET: Performed by: HOSPITALIST

## 2023-12-22 PROCEDURE — 94664 DEMO&/EVAL PT USE INHALER: CPT

## 2023-12-22 PROCEDURE — 63710000001: Performed by: HOSPITALIST

## 2023-12-22 PROCEDURE — A9500 TC99M SESTAMIBI: HCPCS | Performed by: HOSPITALIST

## 2023-12-22 PROCEDURE — 94799 UNLISTED PULMONARY SVC/PX: CPT

## 2023-12-22 PROCEDURE — 93306 TTE W/DOPPLER COMPLETE: CPT

## 2023-12-22 PROCEDURE — 25010000002 REGADENOSON 0.4 MG/5ML SOLUTION: Performed by: HOSPITALIST

## 2023-12-22 PROCEDURE — 63710000001 FLUOXETINE 20 MG CAPSULE: Performed by: HOSPITALIST

## 2023-12-22 PROCEDURE — 78452 HT MUSCLE IMAGE SPECT MULT: CPT

## 2023-12-22 PROCEDURE — 63710000001 PANTOPRAZOLE 40 MG TABLET DELAYED-RELEASE: Performed by: HOSPITALIST

## 2023-12-22 PROCEDURE — 84484 ASSAY OF TROPONIN QUANT: CPT | Performed by: NURSE PRACTITIONER

## 2023-12-22 RX ORDER — CALCIUM CARBONATE 500 MG/1
2 TABLET, CHEWABLE ORAL 2 TIMES DAILY PRN
Status: DISCONTINUED | OUTPATIENT
Start: 2023-12-22 | End: 2023-12-22 | Stop reason: HOSPADM

## 2023-12-22 RX ORDER — SODIUM CHLORIDE 0.9 % (FLUSH) 0.9 %
10 SYRINGE (ML) INJECTION AS NEEDED
Status: DISCONTINUED | OUTPATIENT
Start: 2023-12-22 | End: 2023-12-22 | Stop reason: HOSPADM

## 2023-12-22 RX ORDER — REGADENOSON 0.08 MG/ML
0.4 INJECTION, SOLUTION INTRAVENOUS
Status: COMPLETED | OUTPATIENT
Start: 2023-12-22 | End: 2023-12-22

## 2023-12-22 RX ORDER — DICYCLOMINE HYDROCHLORIDE 10 MG/1
10 CAPSULE ORAL 3 TIMES DAILY PRN
Status: DISCONTINUED | OUTPATIENT
Start: 2023-12-22 | End: 2023-12-22 | Stop reason: HOSPADM

## 2023-12-22 RX ORDER — SODIUM CHLORIDE FOR INHALATION 7 %
4 VIAL, NEBULIZER (ML) INHALATION
Status: DISCONTINUED | OUTPATIENT
Start: 2023-12-22 | End: 2023-12-22 | Stop reason: HOSPADM

## 2023-12-22 RX ORDER — ACETAMINOPHEN 650 MG/1
650 SUPPOSITORY RECTAL EVERY 4 HOURS PRN
Status: DISCONTINUED | OUTPATIENT
Start: 2023-12-22 | End: 2023-12-22 | Stop reason: HOSPADM

## 2023-12-22 RX ORDER — PANTOPRAZOLE SODIUM 40 MG/1
40 TABLET, DELAYED RELEASE ORAL
Status: DISCONTINUED | OUTPATIENT
Start: 2023-12-22 | End: 2023-12-22 | Stop reason: HOSPADM

## 2023-12-22 RX ORDER — ACETAMINOPHEN 325 MG/1
650 TABLET ORAL EVERY 4 HOURS PRN
Status: DISCONTINUED | OUTPATIENT
Start: 2023-12-22 | End: 2023-12-22 | Stop reason: HOSPADM

## 2023-12-22 RX ORDER — FLUOXETINE HYDROCHLORIDE 20 MG/1
20 CAPSULE ORAL DAILY
Status: DISCONTINUED | OUTPATIENT
Start: 2023-12-22 | End: 2023-12-22 | Stop reason: HOSPADM

## 2023-12-22 RX ORDER — ASPIRIN 81 MG/1
81 TABLET, CHEWABLE ORAL ONCE
Status: COMPLETED | OUTPATIENT
Start: 2023-12-22 | End: 2023-12-22

## 2023-12-22 RX ORDER — POTASSIUM CHLORIDE 750 MG/1
20 TABLET, FILM COATED, EXTENDED RELEASE ORAL DAILY
Status: DISCONTINUED | OUTPATIENT
Start: 2023-12-22 | End: 2023-12-22 | Stop reason: HOSPADM

## 2023-12-22 RX ORDER — SODIUM CHLORIDE 9 MG/ML
40 INJECTION, SOLUTION INTRAVENOUS AS NEEDED
Status: DISCONTINUED | OUTPATIENT
Start: 2023-12-22 | End: 2023-12-22 | Stop reason: HOSPADM

## 2023-12-22 RX ORDER — FLUTICASONE PROPIONATE 50 MCG
2 SPRAY, SUSPENSION (ML) NASAL DAILY
Status: DISCONTINUED | OUTPATIENT
Start: 2023-12-22 | End: 2023-12-22 | Stop reason: HOSPADM

## 2023-12-22 RX ORDER — ACETAMINOPHEN 160 MG/5ML
650 SOLUTION ORAL EVERY 4 HOURS PRN
Status: DISCONTINUED | OUTPATIENT
Start: 2023-12-22 | End: 2023-12-22 | Stop reason: HOSPADM

## 2023-12-22 RX ORDER — ONDANSETRON 4 MG/1
4 TABLET, ORALLY DISINTEGRATING ORAL EVERY 8 HOURS PRN
Status: DISCONTINUED | OUTPATIENT
Start: 2023-12-22 | End: 2023-12-22 | Stop reason: HOSPADM

## 2023-12-22 RX ORDER — SODIUM CHLORIDE 0.9 % (FLUSH) 0.9 %
10 SYRINGE (ML) INJECTION EVERY 12 HOURS SCHEDULED
Status: DISCONTINUED | OUTPATIENT
Start: 2023-12-22 | End: 2023-12-22 | Stop reason: HOSPADM

## 2023-12-22 RX ORDER — ARFORMOTEROL TARTRATE 15 UG/2ML
15 SOLUTION RESPIRATORY (INHALATION)
Status: DISCONTINUED | OUTPATIENT
Start: 2023-12-22 | End: 2023-12-22 | Stop reason: HOSPADM

## 2023-12-22 RX ORDER — AMMONIUM LACTATE 120 MG/G
1 CREAM TOPICAL EVERY 12 HOURS PRN
Qty: 140 G | Refills: 0 | Status: SHIPPED | OUTPATIENT
Start: 2023-12-22 | End: 2023-12-30 | Stop reason: HOSPADM

## 2023-12-22 RX ORDER — FUROSEMIDE 40 MG/1
40 TABLET ORAL DAILY
Status: DISCONTINUED | OUTPATIENT
Start: 2023-12-22 | End: 2023-12-22 | Stop reason: HOSPADM

## 2023-12-22 RX ORDER — AMMONIUM LACTATE 120 MG/G
1 CREAM TOPICAL EVERY 12 HOURS PRN
Status: DISCONTINUED | OUTPATIENT
Start: 2023-12-22 | End: 2023-12-22 | Stop reason: HOSPADM

## 2023-12-22 RX ORDER — NITROGLYCERIN 0.4 MG/1
0.4 TABLET SUBLINGUAL
Status: DISCONTINUED | OUTPATIENT
Start: 2023-12-22 | End: 2023-12-22 | Stop reason: HOSPADM

## 2023-12-22 RX ORDER — GABAPENTIN 300 MG/1
300 CAPSULE ORAL 3 TIMES DAILY
Status: DISCONTINUED | OUTPATIENT
Start: 2023-12-22 | End: 2023-12-22 | Stop reason: HOSPADM

## 2023-12-22 RX ORDER — ALBUTEROL SULFATE 2.5 MG/3ML
2.5 SOLUTION RESPIRATORY (INHALATION) EVERY 6 HOURS PRN
Status: DISCONTINUED | OUTPATIENT
Start: 2023-12-22 | End: 2023-12-22 | Stop reason: HOSPADM

## 2023-12-22 RX ORDER — TIZANIDINE 4 MG/1
2 TABLET ORAL NIGHTLY PRN
Status: DISCONTINUED | OUTPATIENT
Start: 2023-12-22 | End: 2023-12-22 | Stop reason: HOSPADM

## 2023-12-22 RX ORDER — ASPIRIN 325 MG
325 TABLET ORAL ONCE
Status: COMPLETED | OUTPATIENT
Start: 2023-12-22 | End: 2023-12-22

## 2023-12-22 RX ADMIN — Medication 4 ML: at 08:25

## 2023-12-22 RX ADMIN — PANTOPRAZOLE SODIUM 40 MG: 40 TABLET, DELAYED RELEASE ORAL at 13:36

## 2023-12-22 RX ADMIN — FLUOXETINE HYDROCHLORIDE 20 MG: 20 CAPSULE ORAL at 12:31

## 2023-12-22 RX ADMIN — ASPIRIN 325 MG: 325 TABLET ORAL at 02:24

## 2023-12-22 RX ADMIN — TECHNETIUM TC 99M SESTAMIBI 1 DOSE: 1 INJECTION INTRAVENOUS at 11:55

## 2023-12-22 RX ADMIN — GABAPENTIN 300 MG: 300 CAPSULE ORAL at 12:31

## 2023-12-22 RX ADMIN — TECHNETIUM TC 99M SESTAMIBI 1 DOSE: 1 INJECTION INTRAVENOUS at 09:07

## 2023-12-22 RX ADMIN — Medication 1 APPLICATION: at 13:36

## 2023-12-22 RX ADMIN — FUROSEMIDE 40 MG: 40 TABLET ORAL at 12:31

## 2023-12-22 RX ADMIN — FLUTICASONE PROPIONATE 2 SPRAY: 50 SPRAY, METERED NASAL at 12:32

## 2023-12-22 RX ADMIN — REGADENOSON 0.4 MG: 0.08 INJECTION, SOLUTION INTRAVENOUS at 11:55

## 2023-12-22 RX ADMIN — POTASSIUM CHLORIDE 20 MEQ: 750 TABLET, EXTENDED RELEASE ORAL at 12:31

## 2023-12-22 RX ADMIN — ASPIRIN 81 MG: 81 TABLET, CHEWABLE ORAL at 13:36

## 2023-12-22 RX ADMIN — Medication 10 ML: at 12:33

## 2023-12-22 NOTE — DISCHARGE SUMMARY
[facility]    Date of Discharge:  12/22/2023    PCP: Harpreet Kate MD    Discharge Diagnosis:   Active Hospital Problems    Diagnosis  POA    **Elevated troponin [R79.89]  Yes    Paroxysmal atrial fibrillation [I48.0]  Yes    HTN (hypertension) [I10]  Yes    On home oxygen therapy [Z99.81]  Not Applicable    Mild malnutrition [E44.1]  Yes    Dyslipidemia [E78.5]  Yes    Gastroesophageal reflux disease [K21.9]  Yes      Resolved Hospital Problems   No resolved problems to display.          Consults       Date and Time Order Name Status Description    12/22/2023  3:12 AM Inpatient Cardiology Consult Completed     12/22/2023  2:09 AM [facility] (on-call MD unless specified) Details            Hospital Course  83 y.o. male initially admitted by  myself as patient into the hospital due to complaint of sore throat of everything on his throat was unremarkable but cardiac troponins were checked in the ER patient had elevated levels.  He has absolutely no cardiopulmonary complaints.  He is a chronically ill gentleman who looks a bit 83 years of age and has significant kyphotic spine which she utilizes a rollator at baseline and to be honest I watched him use it and he does quite well.  Given the above findings, I consulted cardiology as the patient is noncompliant with use of Eliquis and anticoagulation as suggested medically prior to admission.  Dr. Abel Covarrubias saw in consult underwent echocardiogram as well as stress test.  The echo I cannot read in the epic system but it is conveyed through his note that the echo and stress test are unremarkable and that the patient is free to be discharged from the hospital with further outpatient follow-up endorsed.  He does utilize a baby aspirin and he does take oxygen nocturnally given his diagnosis of COPD in which she does not have an exacerbation.  When I saw this gentleman this morning I can tell he was quite proactive in  regards to the thought of discharge and we discussed this and as long as the testing obtained per cardiology today was unremarkable for acute disease, I have no objection to discharge later today and I am placing those orders now.  He denies any additional home health needs to be when I saw him earlier this a.m.    Temp:  [97.5 °F (36.4 °C)-98 °F (36.7 °C)] 97.5 °F (36.4 °C)  Heart Rate:  [] 84  Resp:  [16-18] 18  BP: (127-144)/() 144/105  Body mass index is 23.01 kg/m².    Physical Exam  HENT:      Head: Normocephalic.      Nose: Nose normal.      Mouth/Throat:      Mouth: Mucous membranes are moist.      Pharynx: Oropharynx is clear.   Cardiovascular:      Rate and Rhythm: Normal rate and regular rhythm.   Pulmonary:      Effort: Pulmonary effort is normal. No respiratory distress.      Breath sounds: Normal breath sounds.   Abdominal:      General: Bowel sounds are normal.      Palpations: Abdomen is soft.      Tenderness: There is no abdominal tenderness.   Neurological:      Mental Status: He is alert and oriented to person, place, and time. Mental status is at baseline.       Disposition: Home or Self Care       Discharge Medications        New Medications        Instructions Start Date   ammonium lactate 12 % cream  Commonly known as: AMLACTIN   1 application , Topical, Every 12 Hours PRN             Continue These Medications        Instructions Start Date   albuterol (2.5 MG/3ML) 0.083% nebulizer solution  Commonly known as: PROVENTIL   2.5 mg, Nebulization, Every 6 Hours PRN      azelastine 0.1 % nasal spray  Commonly known as: ASTELIN   2 sprays, Nasal, 2 Times Daily, Use in each nostril as directed      calcium carbonate 500 MG chewable tablet  Commonly known as: TUMS   1 tablet, Oral, As Needed      clotrimazole-betamethasone 1-0.05 % cream  Commonly known as: Lotrisone   1 application , Topical, 2 Times Daily, Do not apply to scrotum      dicyclomine 10 MG capsule  Commonly known as:  BENTYL   10 mg, Oral, 3 Times Daily PRN      FLUoxetine 20 MG capsule  Commonly known as: PROzac   TAKE 1 CAPSULE DAILY      fluticasone 50 MCG/ACT nasal spray  Commonly known as: FLONASE   2 sprays, Each Nare, Daily      furosemide 40 MG tablet  Commonly known as: LASIX   40 mg, Oral, Daily      gabapentin 300 MG capsule  Commonly known as: NEURONTIN   300 mg, Oral, 3 Times Daily      lidocaine 5 %  Commonly known as: LIDODERM   1 patch, Transdermal, Every 24 Hours, Remove & Discard patch within 12 hours or as directed by MD      multivitamin with minerals tablet tablet   1 tablet, Oral, Daily      ondansetron ODT 4 MG disintegrating tablet  Commonly known as: ZOFRAN-ODT   4 mg, Translingual, Every 8 Hours PRN      pantoprazole 40 MG EC tablet  Commonly known as: PROTONIX   TAKE 1 TABLET DAILY ON EMPTY STOMACH HALF AN HOUR BEFORE MEAL      potassium chloride 20 MEQ CR tablet  Commonly known as: K-DUR,KLOR-CON   1 tablet, Oral, Daily      sodium chloride 7 % nebulizer solution nebulizer solution   1 vial, Inhalation, 2 Times Daily      tiZANidine 2 MG tablet  Commonly known as: ZANAFLEX   2 mg, Oral, Nightly PRN      Umeclidinium-Vilanterol 62.5-25 MCG/ACT aerosol powder  inhaler  Commonly known as: ANORO ELLIPTA   1 puff, Inhalation, Daily - RT             Stop These Medications      apixaban 5 MG tablet tablet  Commonly known as: ELIQUIS     budesonide 180 MCG/ACT inhaler  Commonly known as: PULMICORT     Fasenra Pen 30 MG/ML solution auto-injector  Generic drug: Benralizumab     rOPINIRole 0.5 MG tablet  Commonly known as: REQUIP     terbinafine 250 MG tablet  Commonly known as: lamiSIL               Additional Instructions for the Follow-ups that You Need to Schedule       Discharge Follow-up with PCP   As directed       Currently Documented PCP:    Harpreet Kate MD    PCP Phone Number:    [phone]     Follow Up Details: PCP and cardiology as needed               Follow-up Information       Lavinia  MD Harpreet .    Specialty: Internal Medicine  Why: PCP and cardiology as needed  Contact information:  [address] [id number] [phone]                            Future Appointments   Date Time Provider Department Center   4/18/2024 10:00 AM [facility]   4/23/2024  2:30 PM Harpreet Kate MD [facility]     Pending Labs       Order Current Status    Beta Strep Culture, Throat - Swab, Throat Preliminary result           Chad Olguin MD  [facility]  12/22/23    Discharge time spent greater than 30 minutes.

## 2023-12-22 NOTE — ED NOTES
"Nursing report ED to floor  Tony Leonard  83 y.o.  male    HPI :   Chief Complaint   Patient presents with    Sore Throat       Admitting doctor:   Chad Olguin MD    Admitting diagnosis:   The primary encounter diagnosis was Sore throat. A diagnosis of Elevated troponin was also pertinent to this visit.    Code status:   Current Code Status       Date Active Code Status Order ID Comments User Context       12/22/2023 0312 CPR (Attempt to Resuscitate) [id number]  Caren Mead APRN ED        Question Answer    Code Status (Patient has no pulse and is not breathing) CPR (Attempt to Resuscitate)    Medical Interventions (Patient has pulse or is breathing) Full Support                    Allergies:   Daliresp [roflumilast], Latex, and Sulfa antibiotics    Isolation:   Enhanced Droplet/Contact     Intake and Output  No intake or output data in the 24 hours ending 12/22/23 0612    Weight:       12/21/23 2107   Weight: 85.7 kg (189 lb)       Most recent vitals:   Vitals:    12/21/23 2107 12/21/23 2111   BP:  141/71   Pulse: 100 95   Resp: 18 18   Temp: 98 °F (36.7 °C)    TempSrc: Tympanic    SpO2: 98%    Weight: 85.7 kg (189 lb)    Height: 180.3 cm (71\")        Active LDAs/IV Access:   Lines, Drains & Airways       Active LDAs       Name Placement date Placement time Site Days    Peripheral IV 12/22/23 0351 Left Antecubital 12/22/23 0351  Antecubital  less than 1                    Labs (abnormal labs have a star):   Labs Reviewed   COMPREHENSIVE METABOLIC PANEL - Abnormal; Notable for the following components:       Result Value    Glucose 102 (*)     BUN 24 (*)     All other components within normal limits    Narrative:     GFR Normal >60  Chronic Kidney Disease <60  Kidney Failure <15    The GFR formula is only valid for adults with stable renal function between ages 18 and 70.   TROPONIN - Abnormal; Notable for the following components:    HS Troponin T 65 (*)     All other components within normal " limits    Narrative:     High Sensitive Troponin T Reference Range:  <14.0 ng/L- Negative Female for AMI  <22.0 ng/L- Negative Male for AMI  >=14 - Abnormal Female indicating possible myocardial injury.  >=22 - Abnormal Male indicating possible myocardial injury.   Clinicians would have to utilize clinical acumen, EKG, Troponin, and serial changes to determine if it is an Acute Myocardial Infarction or myocardial injury due to an underlying chronic condition.        CBC WITH AUTO DIFFERENTIAL - Abnormal; Notable for the following components:    Hemoglobin 11.3 (*)     Hematocrit 34.8 (*)     RDW 16.4 (*)     Eosinophil % 0.0 (*)     All other components within normal limits   HIGH SENSITIVITIY TROPONIN T 2HR - Abnormal; Notable for the following components:    HS Troponin T 81 (*)     Troponin T Delta 16 (*)     All other components within normal limits    Narrative:     High Sensitive Troponin T Reference Range:  <14.0 ng/L- Negative Female for AMI  <22.0 ng/L- Negative Male for AMI  >=14 - Abnormal Female indicating possible myocardial injury.  >=22 - Abnormal Male indicating possible myocardial injury.   Clinicians would have to utilize clinical acumen, EKG, Troponin, and serial changes to determine if it is an Acute Myocardial Infarction or myocardial injury due to an underlying chronic condition.        RESPIRATORY PANEL PCR W/ COVID-19 (SARS-COV-2), NP SWAB IN UTM/VTP, 2 HR TAT - Normal    Narrative:     In the setting of a positive respiratory panel with a viral infection PLUS a negative procalcitonin without other underlying concern for bacterial infection, consider observing off antibiotics or discontinuation of antibiotics and continue supportive care. If the respiratory panel is positive for atypical bacterial infection (Bordetella pertussis, Chlamydophila pneumoniae, or Mycoplasma pneumoniae), consider antibiotic de-escalation to target atypical bacterial infection.   RAPID STREP A SCREEN - Normal   BETA  HEMOLYTIC STREP CULTURE, THROAT   BASIC METABOLIC PANEL   CBC (NO DIFF)   TROPONIN   CBC AND DIFFERENTIAL    Narrative:     The following orders were created for panel order CBC & Differential.  Procedure                               Abnormality         Status                     ---------                               -----------         ------                     CBC Auto Differential[[id number]]        Abnormal            Final result                 Please view results for these tests on the individual orders.       EKG:   ECG 12 Lead Chest Pain   Preliminary Result   HEART RATE= 61  bpm   RR Interval= 984  ms   DC Interval= 184  ms   P Horizontal Axis=   deg   P Front Axis= -18  deg   QRSD Interval= 100  ms   QT Interval= 438  ms   QTcB= 442  ms   QRS Axis= -17  deg   T Wave Axis= 3  deg   - OTHERWISE NORMAL ECG -   Sinus rhythm   Borderline left axis deviation   Low voltage, precordial leads   Electronically Signed By:    Date and Time of Study: 2023-12-21 23:56:26          Meds given in ED:   Medications   sodium chloride 0.9 % flush 10 mL (has no administration in time range)   sodium chloride 0.9 % flush 10 mL (has no administration in time range)   sodium chloride 0.9 % infusion 40 mL (has no administration in time range)   nitroglycerin (NITROSTAT) SL tablet 0.4 mg (has no administration in time range)   acetaminophen (TYLENOL) tablet 650 mg (has no administration in time range)     Or   acetaminophen (TYLENOL) 160 MG/5ML oral solution 650 mg (has no administration in time range)     Or   acetaminophen (TYLENOL) suppository 650 mg (has no administration in time range)   calcium carbonate (TUMS) chewable tablet 500 mg (200 mg elemental) (has no administration in time range)   aspirin tablet 325 mg (325 mg Oral Given 12/22/23 0224)       Imaging results:  No radiology results for the last day    Ambulatory status:   - ad yuli with Anastasia.    Social issues:   Social History     Socioeconomic History     Marital status: Single   Tobacco Use    Smoking status: Never    Smokeless tobacco: Never   Vaping Use    Vaping Use: Never used   Substance and Sexual Activity    Alcohol use: No     Comment: red wine occassional    Drug use: No    Sexual activity: Defer       NIH Stroke Scale:       Gemma Santana RN  12/22/23 06:12 EST

## 2023-12-22 NOTE — CONSULTS
[facility]  Cardiology Consult Note    Patient Identification:  Name: Tony Leonard  Age: 83 y.o.  Sex: male  :  1940  MRN: [id number]             Requesting Physician: PABLITO James    Reason for Consultation / Chief Complaint: Elevated troponin  History of Present Illness:   This is an 83-year-old pleasant gentleman who is current with our service.  He has a history to include CHF, ADHD, PAF history, diverticulosis, home nocturnal oxygen therapy, and history of prostate cancer.  He presented to [facility] sore throat, elevated troponin's.  Medication given in the emergency room was 325 mg of aspirin.    On admission troponin 65, 81, and 59.  Potassium 4.1, creatinine 0.97, GFR 76.5, glucose 102.  Chest x-ray shows some minimal atelectasis at right lung base.    Echo in  revealed EF 61 to 65% with normal LV function.  Mild dilation of the ascending aorta present.  Stress test in  indicated a normal Myocard perfusion study with no evidence of ischemia.  Cardiac cath in  recommended medical management as impression was normal coronary arteries.    Comorbid cardiac risk factors:     Past Medical History:  Past Medical History:   Diagnosis Date    ADHD (attention deficit hyperactivity disorder)     Bronchiectasis     CHF (congestive heart failure)     Colon polyp     COPD (chronic obstructive pulmonary disease)     Diverticulosis     Hard of hearing     On home oxygen therapy     2 LITERS    Pneumonia     Prostate cancer 2019    Spinal stenosis, lumbar region with neurogenic claudication 2022     Past Surgical History:  Past Surgical History:   Procedure Laterality Date    BRONCHOSCOPY N/A 2016    Procedure: BRONCHOSCOPY with BAL of right lower lobe and left  lower lobe.  ;  Surgeon: Nando Diaz MD;  Location: [facility] ENDOSCOPY;  Service:     BRONCHOSCOPY N/A 2017    Procedure: BRONCHOSCOPY;  Surgeon: Katharina Salter MD;   Location[facility] END[facility]COPY;  Service:     BRONCHOSCOPY Bilateral 6/29/2017    Procedure: BRONCHOSCOPY WITH WASHINGS;  Surgeon: Katharina Salter MD;  [facility];  Service:     BRONCHOSCOPY N/A 1/22/2018    Procedure: BRONCHOSCOPY AT BEDSIDE with BAL;  Surgeon: Katharina Salter MD;  [facility] END[facility]COPY;  Service:     BRONCHOSCOPY N/A 1/30/2018    Procedure: BRONCHOSCOPY with BAL and washing;  Surgeon: Shreyas Wild MD;  [facility];  Service:     BRONCHOSCOPY Bilateral 4/24/2018    Procedure: BRONCHOSCOPY WITH WASHING;  Surgeon: Katharina Salter MD;  [facility];  Service: Pulmonary    BRONCHOSCOPY N/A 12/28/2019    Procedure: BRONCHOSCOPY with bilateral washing;  Surgeon: Katharina Salter MD;  Location: [facility] ENDOSCOPY;  Service: Pulmonary    BRONCHOSCOPY Bilateral 1/4/2023    Procedure: BRONCHOSCOPY with lavage;  Surgeon: Katharina Salter MD;  Location: [facility] ENDOSCOPY;  Service: Pulmonary;  Laterality: Bilateral;  mucus plugging    CARDIAC CATHETERIZATION N/A 1/29/2023    Procedure: Left Heart Cath;  Surgeon: Johnnie Ang MD;  Location: [facility] CATH INVASIVE LOCATION;  Service: Cardiology;  Laterality: N/A;    CARDIAC CATHETERIZATION N/A 1/29/2023    Procedure: Coronary angiography;  Surgeon: Johnnie Ang MD;  Location: [facility] CATH INVASIVE LOCATION;  Service: Cardiology;  Laterality: N/A;    COLONOSCOPY  05/17/2013    eh, ih, tort, sig tics    COLONOSCOPY N/A 5/10/2019    Non-thrombosed external hemorrhoids found on perianal exam, Diverticulosis, Tortuous colon, One 5 mm polyp in the mid ascending colon, IH. Path: Tubular adenoma.     COLONOSCOPY N/A 9/24/2022    Procedure: COLONOSCOPY to cecum and TI with argon plasma coagulation.;  Surgeon: Bruce Black MD;  Location: [facility] ENDOSCOPY;  Service: Gastroenterology;  Laterality: N/A;  pre- GI bleeding  post- radiation proctitis, diverticulosis    ENDOSCOPY  03/19/2015    z line irre,      ENDOSCOPY N/A 9/24/2022    Procedure: ESOPHAGOGASTRODUODENOSCOPY;  Surgeon: Bruce Black MD;  Location: [facility] ENDOSCOPY;  Service: Gastroenterology;  Laterality: N/A;  pre- GI bleeding  post- esophagitis, hiatal hernia    HIP OPEN REDUCTION Right 1/17/2018    Procedure: HIP OPEN REDUCTION INTERNAL FIXATION WITH DYNAMIC HIP SCREW;  Surgeon: Les Black MD;  Location: [facility] MAIN OR;  Service:     SPINE SURGERY      TONSILLECTOMY      TOTAL HIP ARTHROPLASTY REVISION Right 9/23/2019    Procedure: HIP  REVISION RIGHT;  Surgeon: Isauro Warner MD;  Location: [facility] MAIN OR;  Service: Orthopedics      Allergies:  Allergies   Allergen Reactions    Daliresp [Roflumilast] Anaphylaxis    Latex Rash    Sulfa Antibiotics Rash     Home Meds:  Facility-Administered Medications Prior to Admission   Medication Dose Route Frequency Provider Last Rate Last Admin    aspirin chewable tablet 81 mg  81 mg Oral Once Tenisha Xavier APRN         Medications Prior to Admission   Medication Sig Dispense Refill Last Dose    albuterol (PROVENTIL) (2.5 MG/3ML) 0.083% nebulizer solution Take 2.5 mg by nebulization Every 6 (Six) Hours As Needed for Wheezing. 360 mL 3     apixaban (ELIQUIS) 5 MG tablet tablet Take 1 tablet by mouth 2 (Two) Times a Day. (Patient not taking: Reported on 5/23/2023) 60 tablet 5     azelastine (ASTELIN) 0.1 % nasal spray 2 sprays into the nostril(s) as directed by provider 2 (Two) Times a Day. Use in each nostril as directed 30 mL 0     budesonide (PULMICORT) 180 MCG/ACT inhaler Inhale 2 puffs 2 (Two) Times a Day. (Patient not taking: Reported on 5/23/2023) 1 each 3     calcium carbonate (TUMS) 500 MG chewable tablet Chew 1 tablet As Needed for Indigestion or Heartburn.       clotrimazole-betamethasone (Lotrisone) 1-0.05 % cream Apply 1 application topically to the appropriate area as directed 2 (Two) Times a Day. Do not apply to scrotum 45 g 1     dicyclomine (BENTYL) 10 MG capsule Take 1 capsule by  mouth 3 (Three) Times a Day As Needed (abdominal bloating). 30 capsule 0     Fasenra Pen 30 MG/ML solution auto-injector        FLUoxetine (PROzac) 20 MG capsule TAKE 1 CAPSULE DAILY 90 capsule 3     fluticasone (FLONASE) 50 MCG/ACT nasal spray 2 sprays by Each Nare route Daily. 11.1 mL 1     furosemide (LASIX) 40 MG tablet Take 1 tablet by mouth Daily.       gabapentin (NEURONTIN) 300 MG capsule Take 1 capsule by mouth 3 (Three) Times a Day. 270 capsule 1     lidocaine (LIDODERM) 5 % Place 1 patch on the skin as directed by provider Daily. Remove & Discard patch within 12 hours or as directed by MD 6 each 0     Multiple Vitamins-Minerals (MULTIVITAMIN ADULT PO) Take 1 tablet by mouth Daily.       ondansetron ODT (ZOFRAN-ODT) 4 MG disintegrating tablet Place 1 tablet on the tongue Every 8 (Eight) Hours As Needed for Nausea or Vomiting (nausea/vomiting). 15 tablet 0     pantoprazole (PROTONIX) 40 MG EC tablet TAKE 1 TABLET DAILY ON EMPTY STOMACH HALF AN HOUR BEFORE MEAL 90 tablet 3     potassium chloride (K-DUR,KLOR-CON) 20 MEQ CR tablet Take 1 tablet by mouth Daily.       rOPINIRole (REQUIP) 0.5 MG tablet Take 1 tablet by mouth Every Night. Take 1 hour before bedtime. (Patient not taking: Reported on 9/7/2023) 90 tablet 1     sodium chloride 7 % nebulizer solution nebulizer solution Inhale 1 vial 2 (Two) Times a Day.       terbinafine (lamiSIL) 250 MG tablet Take 1 tablet by mouth Daily. (Patient not taking: Reported on 5/23/2023)       tiZANidine (ZANAFLEX) 2 MG tablet Take 1 tablet by mouth At Night As Needed for Muscle Spasms. 30 tablet 1     Umeclidinium-Vilanterol (ANORO ELLIPTA) 62.5-25 MCG/ACT aerosol powder  inhaler Inhale 1 puff Daily.        Current Meds:   [unfilled]  Social History:   Social History     Tobacco Use    Smoking status: Never    Smokeless tobacco: Never   Substance Use Topics    Alcohol use: No     Comment: red wine occassional      Family History:  Family History   Problem Relation Age of  "Onset    Thyroid disease Mother     No Known Problems Father     Luciana Hyperthermia Neg Hx         Review of Systems    Review of Systems  Constitutional: No wt loss, fever   Gastrointestinal: No nausea , abdominal pain  Behavioral/Psych: No insomnia or anxiety   Cardiovascular ----positive for shortness of breath. All other systems reviewed and are negative          Temp:  [97.6 °F (36.4 °C)-98 °F (36.7 °C)] 97.6 °F (36.4 °C)  Heart Rate:  [] 84  Resp:  [16-18] 18  BP: (127-144)/() 144/105    Physical Exam     BP (!) 144/105 (BP Location: Right arm, Patient Position: Lying)   Pulse 84   Temp 97.5 °F (36.4 °C) (Oral)   Resp 18   Ht 180.3 cm (71\")   Wt 74.8 kg (165 lb)   SpO2 93%   BMI 23.01 kg/m²   General appearance: No acute changes   Neck: Trachea midline; NECK, supple, no thyromegaly or lymphadenopathy   Lungs: Normal size and shape, normal breath sounds, equal distribution of air, no rales and rhonchi   CV: S1-S2 regular, no murmurs, no rub, no gallop   Abdomen: Soft, nontender; no masses , no abnormal abdominal sounds   Extremities: No deformity , normal color , no peripheral edema   Skin: Normal temperature, turgor and texture; no rash, ulcers            Cardiographics      ECG:   sr      mpression:    Normal coronary arteries  Normal LV  Recommendations:    Medical managed        I sincerely appreciate the opportunity to participate in your patient's care. Please feel free to contact me anytime if I can be of assistance in this or any other way.    Johnnie Ang MD  1/29/2023  15:52 EST      Interpretation Summary    Diaphragmatic attenuation artifact is present.  Myocardial perfusion imaging indicates a normal myocardial perfusion study with no evidence of ischemia.  Left ventricular ejection fraction is normal (Calculated EF = 56%).  Impressions are consistent with a low risk study.        Interpretation Summary         Left ventricular ejection fraction appears to be 61 - 65%.   "  Left ventricular diastolic function was normal.    Estimated right ventricular systolic pressure from tricuspid regurgitation is normal (<35 mmHg).    Mild dilation of the ascending aorta is present    Imaging  Chest X-ray:     Lab RStudy Result    Narrative & Impression   XR CHEST 1 VW-12/22/2023     HISTORY: Chest pain.     The heart size is within normal limits. Lungs are underinflated. There  is slight elevation of the right hemidiaphragm and there may be some  minimal atelectasis of the right base. Lungs are otherwise clear. Lower  cervical fusion plate is seen.     IMPRESSION:  1. Slight elevation of the right hemidiaphragm similar to the 10/18/2023  study. There appears to be some minimal atelectasis of the right lung  base.        This report was finalized on 12/22/2023 6:31 AM by Dr. Roberto Fernandes M.D on Workstation: [id number]      eview   Results from last 7 days   Lab Units 12/22/23  0737 12/22/23  0134 12/21/23  2339   HSTROP T ng/L 59* 81* 65*         Results from last 7 days   Lab Units 12/22/23  0737   SODIUM mmol/L 140   POTASSIUM mmol/L 3.6   BUN mg/dL 20   CREATININE mg/dL 0.95   CALCIUM mg/dL 9.0     @[id number]@  Results from last 7 days   Lab Units 12/22/23  0737 12/21/23  2339   WBC 10*3/mm3 6.53 6.67   HEMOGLOBIN g/dL 10.6* 11.3*   HEMATOCRIT % 34.0* 34.8*   PLATELETS 10*3/mm3 240 229             Assessment:      Elevated troponin    Gastroesophageal reflux disease    Dyslipidemia    Mild malnutrition    On home oxygen therapy    HTN (hypertension)    Paroxysmal atrial fibrillation  Ascending thoracic aortic measuring 4.2 cm on CT in December 2022.: do CTA of chest to eval        Recommendations / Plan:     This is a 83-year-old male who is current with our service.  He has a history of hyperlipidemia, hypertension, and history of PAF  (previously on Eliquis, but not currently). He presented to [facility] with your throat.  He comes in consult for elevated troponin.   Previous cardiac cath early 2023 recommended medical management.  Stress test and echo pending.  Further recommendations once these have been completed and read.          It was explained to the patient that stress testing carries 85% specificity/sensitivity, and does not rule out future cardiac event.  Risks of the procedure were explained to the patient including shortness of breath, induction of myocardial infarction, and dizziness.  Patient is agreeable to proceeding with stress testing.     Stress test and echoes are normal    Patient can be discharged and follow-up as an outpatient    Patient is off anticoagulation per his choice    Tony L Horacio needs anti coagulation  At this point pt is not on anticoagulations.  Pros and cons of anticoagulations has been discussed  Alternate methods including Watchman has been discussed  At this stage it has been decided NO ANTICOAGULATIONS  Johnnie Ang MD    12/22/2023, 13:21 EST      EMR Dragon/Transcription:   Dictated utilizing Dragon dictation

## 2023-12-22 NOTE — ED PROVIDER NOTES
EMERGENCY DEPARTMENT ENCOUNTER    Room Number:  01/01  PCP: Harpreet Kate MD      HPI:  Chief Complaint: Sore throat  A complete HPI/ROS/PMH/PSH/SH/FH are unobtainable due to: Patient is very pleasant but vague historian  Context: Tony Leonard is a 83 y.o. male with a significant past medical history of paroxysmal A-fib, oral thrush, bronchiectasis, GERD, COPD, pneumonia who presents to the ED c/o sore throat ongoing for the past couple days.  The sore throat pain is described as a swirling sensation and said to be intermittent.  There is some discomfort with swallowing but no trouble swallowing.  He denies fever, chills, cough, shortness of breath, abdominal pain, nausea, vomiting, chest discomfort associated with a sore throat.  He is unaware of any recent sick contacts.  He is here for further evaluation.          PAST MEDICAL HISTORY  Active Ambulatory Problems     Diagnosis Date Noted    Thrush, oral 03/11/2016    ADD (attention deficit disorder) 03/11/2016    Hemorrhoids 03/11/2016    Bronchiectasis with acute lower respiratory infection 03/11/2016    Diverticul disease small and large intestine, no perforati or abscess 03/11/2016    Benign non-nodular prostatic hyperplasia without lower urinary tract symptoms 03/11/2016    Gastroesophageal reflux disease 03/11/2016    Malaise and fatigue 03/11/2016    Environmental allergies 04/25/2016    Hemoptysis 04/27/2016    Dyslipidemia 05/11/2016    Primary osteoarthritis involving multiple joints 05/11/2016    Pneumonia of right lower lobe due to infectious organism 10/03/2016    Abnormal EKG 10/04/2016    COPD (chronic obstructive pulmonary disease) 10/04/2016    Mild malnutrition 03/28/2017    Acute on chronic respiratory failure with hypoxia 04/27/2017    Fracture, intertrochanteric, right femur, closed, initial encounter 01/16/2018    Chronic diastolic CHF (congestive heart failure) 01/16/2018    Hematoma of frontal scalp 01/16/2018    Postoperative anemia  due to acute blood loss 01/19/2018    Urinary retention 01/25/2018    Hemorrhagic disorder due to circulating anticoagulants 01/29/2018    History of fracture of right hip 02/22/2018    Closed fracture of right hip with routine healing 02/28/2018    Chronic low back pain with sciatica 06/21/2018    Change in bowel function 04/18/2019    Rectal bleeding 04/18/2019    Prostate cancer 04/22/2019    On home oxygen therapy 09/24/2019    Acute viral bronchitis 12/29/2019    Peripheral neuropathy 07/01/2021    Plantar fasciitis 09/08/2021    HTN (hypertension) 05/06/2022    COPD exacerbation 05/07/2022    Bilateral lower extremity edema 05/07/2022    Microscopic hematuria 05/08/2022    Acute midline low back pain with right-sided sciatica 08/01/2022    Spinal stenosis, lumbar region with neurogenic claudication 08/02/2022    Compression deformity of vertebra 08/02/2022    Rectal bleed 09/23/2022    Esophagitis 09/24/2022    Angiectasia 09/27/2022    Hiatal hernia 09/27/2022    Overweight (BMI 25.0-29.9) 11/14/2022    Leukocytosis 11/15/2022    Bilateral hip pain 11/21/2022    Elevated troponin level not due myocardial infarction 12/10/2022    Nocturnal hypoxia 12/10/2022    Pneumonia of right upper lobe due to infectious organism 12/29/2022    NSTEMI (non-ST elevated myocardial infarction) 01/29/2023    Chronic respiratory failure with hypoxia 01/29/2023    Paroxysmal atrial fibrillation 02/17/2023    Acute exacerbation of chronic obstructive pulmonary disease (COPD) 05/10/2023    Anemia 05/10/2023    Chronic anticoagulation 05/10/2023    Non-compliant patient 05/11/2023    Hypokalemia 10/01/2020    Spondylolisthesis of lumbar region 08/14/2018     Resolved Ambulatory Problems     Diagnosis Date Noted    Pneumonia of both lower lobes due to infectious organism 03/11/2016    Pneumonia of right upper lobe due to infectious organism 04/22/2016    COPD exacerbation 04/25/2016    GERD (gastroesophageal reflux disease)  04/25/2016    Chronic respiratory failure with hypoxia 10/04/2016    Bacterial lobar pneumonia 12/27/2016    Pneumonia of left lower lobe due to infectious organism 03/26/2017    Bronchitis 06/01/2017    COPD exacerbation 06/17/2017    Leukocytosis 01/16/2018    Right upper lobe pneumonia 01/20/2018    Mucus plugging of bronchi 01/16/2018    COPD exacerbation 04/16/2018    Degenerative joint disease (DJD) of hip 09/23/2019    Bronchiectasis with (acute) exacerbation 12/28/2019    Septic shock 11/26/2022    Acute saddle pulmonary embolism without acute cor pulmonale  - 12/11/2022 12/11/2022     Past Medical History:   Diagnosis Date    ADHD (attention deficit hyperactivity disorder)     Bronchiectasis     CHF (congestive heart failure)     Colon polyp     Diverticulosis     Hard of hearing     Pneumonia          PAST SURGICAL HISTORY  Past Surgical History:   Procedure Laterality Date    BRONCHOSCOPY N/A 4/30/2016    Procedure: BRONCHOSCOPY with BAL of right lower lobe and left  lower lobe.  ;  Surgeon: Nando Diaz MD;  Location: [facility] ENDOSCOPY;  Service:     BRONCHOSCOPY N/A 4/28/2017    Procedure: BRONCHOSCOPY;  Surgeon: Katharina Salter MD;  Location: [facility] ENDOSCOPY;  Service:     BRONCHOSCOPY Bilateral 6/29/2017    Procedure: BRONCHOSCOPY WITH WASHINGS;  Surgeon: Katharina Salter MD;  Location: [facility] ENDOSCOPY;  Service:     BRONCHOSCOPY N/A 1/22/2018    Procedure: BRONCHOSCOPY AT BEDSIDE with BAL;  Surgeon: Katharina Salter MD;  Location: [facility] ENDOSCOPY;  Service:     BRONCHOSCOPY N/A 1/30/2018    Procedure: BRONCHOSCOPY with BAL and washing;  Surgeon: Shreyas Wild MD;  Location: [facility] ENDOSCOPY;  Service:     BRONCHOSCOPY Bilateral 4/24/2018    Procedure: BRONCHOSCOPY WITH WASHING;  Surgeon: Katharina Salter MD;  Location: [facility] ENDOSCOPY;  Service: Pulmonary    BRONCHOSCOPY N/A 12/28/2019    Procedure: BRONCHOSCOPY with bilateral washing;  Surgeon: Katharina Salter MD;  Location: [facility];   Service: Pulmonary    BRONCHOSCOPY Bilateral 1/4/2023    Procedure: BRONCHOSCOPY with lavage;  Surgeon: Katharina Salter MD;  Location: [facility] ENDOSCOPY;  Service: Pulmonary;  Laterality: Bilateral;  mucus plugging    CARDIAC CATHETERIZATION N/A 1/29/2023    Procedure: Left Heart Cath;  Surgeon: Johnnie Ang MD;  [facility];  Service: Cardiology;  Laterality: N/A;    CARDIAC CATHETERIZATION N/A 1/29/2023    Procedure: Coronary angiography;  Surgeon: Johnnie Ang MD;  [facility];  Service: Cardiology;  Laterality: N/A;    COLONOSCOPY  05/17/2013    eh, ih, tort, sig tics    COLONOSCOPY N/A 5/10/2019    Non-thrombosed external hemorrhoids found on perianal exam, Diverticulosis, Tortuous colon, One 5 mm polyp in the mid ascending colon, IH. Path: Tubular adenoma.     COLONOSCOPY N/A 9/24/2022    Procedure: COLONOSCOPY to cecum and TI with argon plasma coagulation.;  Surgeon: Bruce Black MD;  Location: [facility] ENDOSCOPY;  Service: Gastroenterology;  Laterality: N/A;  pre- GI bleeding  post- radiation proctitis, diverticulosis    ENDOSCOPY  03/19/2015    z line irreg, hh    ENDOSCOPY N/A 9/24/2022    Procedure: ESOPHAGOGASTRODUODENOSCOPY;  Surgeon: Bruce Black MD;  Location: [facility] ENDOSCOPY;  Service: Gastroenterology;  Laterality: N/A;  pre- GI bleeding  post- esophagitis, hiatal hernia    HIP OPEN REDUCTION Right 1/17/2018    Procedure: HIP OPEN REDUCTION INTERNAL FIXATION WITH DYNAMIC HIP SCREW;  Surgeon: Les Black MD;  Location: [facility] MAIN OR;  Service:     SPINE SURGERY      TONSILLECTOMY      TOTAL HIP ARTHROPLASTY REVISION Right 9/23/2019    Procedure: HIP  REVISION RIGHT;  Surgeon: Isauro Warner MD;  Location: [facility] MAIN OR;  Service: Orthopedics         FAMILY HISTORY  Family History   Problem Relation Age of Onset    Thyroid disease Mother     No Known Problems Father     Malig Hyperthermia Neg Hx          SOCIAL  HISTORY  Social History     Socioeconomic History    Marital status: Single   Tobacco Use    Smoking status: Never    Smokeless tobacco: Never   Vaping Use    Vaping Use: Never used   Substance and Sexual Activity    Alcohol use: No     Comment: red wine occassional    Drug use: No    Sexual activity: Defer         ALLERGIES  Daliresp [roflumilast], Latex, and Sulfa antibiotics        REVIEW OF SYSTEMS  Review of Systems     All systems reviewed and negative except for those discussed in HPI.       PHYSICAL EXAM  ED Triage Vitals   Temp Heart Rate Resp BP SpO2   12/21/23 2107 12/21/23 2107 12/21/23 2107 12/21/23 2111 12/21/23 2107   98 °F (36.7 °C) 100 18 141/71 98 %      Temp src Heart Rate Source Patient Position BP Location FiO2 (%)   12/21/23 2107 -- -- -- --   Tympanic           Physical Exam      GENERAL: WDWN male, no acute distress  HENT: nares patent.  No obvious erythema, no tonsillar exudate, soft palate swelling, peritonsillar abscess or uvular deviation.  No trismus.  EYES: no scleral icterus  CV: regular rhythm, normal rate, normal S1-S2  RESPIRATORY: normal effort, lungs CTAB  ABDOMEN: soft  MUSCULOSKELETAL: no deformity  NEURO: alert, moves all extremities, follows commands  PSYCH:  calm, cooperative  SKIN: warm, dry    Vital signs and nursing notes reviewed.          LAB RESULTS  Recent Results (from the past 24 hour(s))   Respiratory Panel PCR w/COVID-19(SARS-CoV-2) [facility]O/[facility]AL/[facility] In-House, NP Swab in UTM/VTM, 2 HR TAT - Swab, Nasopharynx    Collection Time: 12/21/23  9:42 PM    Specimen: Nasopharynx; Swab   Result Value Ref Range    ADENOVIRUS, PCR Not Detected Not Detected    Coronavirus 229E Not Detected Not Detected    Coronavirus HKU1 Not Detected Not Detected    Coronavirus NL63 Not Detected Not Detected    Coronavirus OC43 Not Detected Not Detected    COVID19 Not Detected Not Detected - Ref. Range    Human Metapneumovirus Not Detected Not Detected    Human Rhinovirus/Enterovirus Not  Detected Not Detected    Influenza A PCR Not Detected Not Detected    Influenza B PCR Not Detected Not Detected    Parainfluenza Virus 1 Not Detected Not Detected    Parainfluenza Virus 2 Not Detected Not Detected    Parainfluenza Virus 3 Not Detected Not Detected    Parainfluenza Virus 4 Not Detected Not Detected    RSV, PCR Not Detected Not Detected    Bordetella pertussis pcr Not Detected Not Detected    Bordetella parapertussis PCR Not Detected Not Detected    Chlamydophila pneumoniae PCR Not Detected Not Detected    Mycoplasma pneumo by PCR Not Detected Not Detected   Rapid Strep A Screen - Swab, Throat    Collection Time: 12/21/23  9:42 PM    Specimen: Throat; Swab   Result Value Ref Range    Strep A Ag Negative Negative   Comprehensive Metabolic Panel    Collection Time: 12/21/23 11:39 PM    Specimen: Blood   Result Value Ref Range    Glucose 102 (H) 65 - 99 mg/dL    BUN 24 (H) 8 - 23 mg/dL    Creatinine 0.97 0.76 - 1.27 mg/dL    Sodium 142 136 - 145 mmol/L    Potassium 4.1 3.5 - 5.2 mmol/L    Chloride 107 98 - 107 mmol/L    CO2 24.5 22.0 - 29.0 mmol/L    Calcium 9.1 8.6 - 10.5 mg/dL    Total Protein 6.4 6.0 - 8.5 g/dL    Albumin 4.0 3.5 - 5.2 g/dL    ALT (SGPT) 15 1 - 41 U/L    AST (SGOT) 19 1 - 40 U/L    Alkaline Phosphatase 60 39 - 117 U/L    Total Bilirubin 0.2 0.0 - 1.2 mg/dL    Globulin 2.4 gm/dL    A/G Ratio 1.7 g/dL    BUN/Creatinine Ratio 24.7 7.0 - 25.0    Anion Gap 10.5 5.0 - 15.0 mmol/L    eGFR 76.5 >60.0 mL/min/1.73   High Sensitivity Troponin T    Collection Time: 12/21/23 11:39 PM    Specimen: Blood   Result Value Ref Range    HS Troponin T 65 (C) <22 ng/L   CBC Auto Differential    Collection Time: 12/21/23 11:39 PM    Specimen: Blood   Result Value Ref Range    WBC 6.67 3.40 - 10.80 10*3/mm3    RBC 4.20 4.14 - 5.80 10*6/mm3    Hemoglobin 11.3 (L) 13.0 - 17.7 g/dL    Hematocrit 34.8 (L) 37.5 - 51.0 %    MCV 82.9 79.0 - 97.0 fL    MCH 26.9 26.6 - 33.0 pg    MCHC 32.5 31.5 - 35.7 g/dL    RDW  16.4 (H) 12.3 - 15.4 %    RDW-SD 48.1 37.0 - 54.0 fl    MPV 9.6 6.0 - 12.0 fL    Platelets 229 140 - 450 10*3/mm3    Neutrophil % 57.1 42.7 - 76.0 %    Lymphocyte % 30.9 19.6 - 45.3 %    Monocyte % 11.2 5.0 - 12.0 %    Eosinophil % 0.0 (L) 0.3 - 6.2 %    Basophil % 0.7 0.0 - 1.5 %    Immature Grans % 0.1 0.0 - 0.5 %    Neutrophils, Absolute 3.80 1.70 - 7.00 10*3/mm3    Lymphocytes, Absolute 2.06 0.70 - 3.10 10*3/mm3    Monocytes, Absolute 0.75 0.10 - 0.90 10*3/mm3    Eosinophils, Absolute 0.00 0.00 - 0.40 10*3/mm3    Basophils, Absolute 0.05 0.00 - 0.20 10*3/mm3    Immature Grans, Absolute 0.01 0.00 - 0.05 10*3/mm3    nRBC 0.0 0.0 - 0.2 /100 WBC   ECG 12 Lead Chest Pain    Collection Time: 12/21/23 11:56 PM   Result Value Ref Range    QT Interval 438 ms    QTC Interval 442 ms   High Sensitivity Troponin T 2Hr    Collection Time: 12/22/23  1:34 AM    Specimen: Blood   Result Value Ref Range    HS Troponin T 81 (C) <22 ng/L    Troponin T Delta 16 (C) >=-4 - <+4 ng/L       Ordered the above labs and reviewed the results.        RADIOLOGY  No Radiology Exams Resulted Within Past 24 Hours    Ordered the above noted radiological studies. Reviewed by me in PACS.          PROCEDURES  Procedures          MEDICATIONS GIVEN IN ER  Medications   sodium chloride 0.9 % flush 10 mL (has no administration in time range)   sodium chloride 0.9 % flush 10 mL (has no administration in time range)   sodium chloride 0.9 % infusion 40 mL (has no administration in time range)   nitroglycerin (NITROSTAT) SL tablet 0.4 mg (has no administration in time range)   acetaminophen (TYLENOL) tablet 650 mg (has no administration in time range)     Or   acetaminophen (TYLENOL) 160 MG/5ML oral solution 650 mg (has no administration in time range)     Or   acetaminophen (TYLENOL) suppository 650 mg (has no administration in time range)   calcium carbonate (TUMS) chewable tablet 500 mg (200 mg elemental) (has no administration in time range)   aspirin  tablet 325 mg (325 mg Oral Given 12/22/23 0224)         MEDICAL DECISION MAKING, PROGRESS, and CONSULTS    Discussion below represents my analysis of pertinent findings related to patient's condition, differential diagnosis, treatment plan and final disposition.      Orders placed during this visit:  Orders Placed This Encounter   Procedures    Respiratory Panel PCR w/COVID-19(SARS-CoV-2) [facility]AL/[facility] In-House, NP Swab in UTM/VTM, 2 HR TAT - Swab, Nasopharynx    Rapid Strep A Screen - Swab, Throat    Beta Strep Culture, Throat - Swab, Throat    XR Chest 1 View    Comprehensive Metabolic Panel    High Sensitivity Troponin T    CBC Auto Differential    High Sensitivity Troponin T 2Hr    Basic Metabolic Panel    CBC (No Diff)    High Sensitivity Troponin T    NPO Diet NPO Type: Strict NPO    Vital Signs    Intake & Output    Weigh Patient    Oral Care    Place Sequential Compression Device    Maintain Sequential Compression Device    Telemetry - Maintain IV Access    Telemetry - Place Orders & Notify Provider of Results When Patient Experiences Acute Chest Pain, Dysrhythmia or Respiratory Distress    May Be Off Telemetry for Tests    Code Status and Medical Interventions:    [facility] (on-call MD unless specified) Details    Inpatient Cardiology Consult    ECG 12 Lead Chest Pain    Insert Peripheral IV    Initiate Observation Status    CBC & Differential         Additional sources:  - Discussed/obtained information from independent historians: None available  Additional information was obtained to confirm the patient's history.    - External (non-ED) record review: Patient is followed by Dr. Kate and was last seen 10/19/2023.  He was seen for abdominal cramps and black loose stools 3-4 times a day.  He went to the ED and was checked his stools came out to be negative.  Repeat hemoglobin showed no drop.  He was discharged back home with a prescription for dicyclomine which he had to start.  His hemoglobin continued  remained stable and he was to restart his Eliquis.  He was to follow-up in 6 months.            - Chronic or social conditions impacting care: None        Differential diagnosis:    Strep pharyngitis, COVID-19, influenza, other viral pharyngitis, peritonsillar abscess.  Clinical picture and exam point away for peritonsillar abscess.  Will obtain strep panel as well as respiratory panel to further evaluate.        Independent interpretation of labs, radiology studies, and discussions with consultants:  ED Course as of 12/22/23 0625   Thu Dec 21, 2023   2200 Raipid strep, RVP negative.  History is vague.  Discussed with my supervising physician.  Will get ECG, troponins x 2, cbc, cmp, cxr to further evaluate. [RC]   Fri Dec 22, 2023   0010 WBC: 6.67 [RC]   0010 RBC: 4.20 [RC]   0010 Hemoglobin(!): 11.3 [RC]   0010 Hematocrit(!): 34.8 [RC]   0010 Platelets: 229 [RC]   0021 HS Troponin T(!!): 65 [RC]   0027 EKG          EKG time: 2356  Rhythm/Rate: Normal sinus, rate 61  P waves and UT: Normal P, normal UT  QRS, axis: Narrow QRS, normal axis  ST and T waves: No acute    Independently Interpreted by me  Not significantly changed compared to prior 10/18/2023   [TR]   0050 HS Troponin T(!!): 65  This was in the 30s times 5 months ago [RC]   0209 Troponins and delta change noted.  325 ASA ordered at this time.  Will place a call to the hospitalist lalo admission for further evaluation.  Patient is resting quietly in no acute distress. [RC]      ED Course User Index  [RC] Erlin Hu III, PA  [TR] Malik Benedict MD               DIAGNOSIS  Final diagnoses:   Sore throat   Elevated troponin         DISPOSITION  Admission      Latest Documented Vital Signs:  As of 06:25 EST  BP- 127/87 HR- 96 Temp- 98 °F (36.7 °C) (Tympanic) O2 sat- 98%      --    Please note that portions of this were completed with a voice recognition program.       Note Disclaimer: At [facility], we believe that sharing information builds  trust and better relationships. You are receiving this note because you are receiving care at [facility] or recently visited. It is possible you will see health information before a provider has talked with you about it. This kind of information can be easy to misunderstand. To help you fully understand what it means for your health, we urge you to discuss this note with your provider.         Erlin Hu III, PA  12/22/23 0654

## 2023-12-22 NOTE — ED PROVIDER NOTES
MD ATTESTATION NOTE    The GUILHERME and I have discussed this patient's history, physical exam, and treatment plan.  I have reviewed the documentation and personally had a face to face interaction with the patient. I affirm the documentation and agree with the treatment and plan.  The attached note describes my personal findings.    I provided a substantive portion of the care of this patient. I personally performed the physical exam, in its entirety.    Independent Historians: Patient    A complete HPI/ROS/PMH/PSH/SH/FH are unobtainable due to: Nothing    Chronic or social conditions impacting patient care (social determinants of health): None    Tony Leonard is a 83 y.o. male who presents to the ED c/o acute sore throat.  The patient reports for the last few days he has had pain in his throat.  He states it feels like prior episodes of strep throat.  He denies any chest pain or shortness of breath.  He denies any fevers.  He denies any productive cough.  He has not take any medicine for his symptoms.  Nothing makes it worse or better.      Review of prior external notes (non-ED) -and- Review of prior external test results outside of this encounter: Laboratory evaluation 10/18/2023 shows normal CMP, normal CBC    Prescription drug monitoring program review:        On exam:  GENERAL: Awake, alert, no acute distress  SKIN: Warm, dry  HENT: Normocephalic, atraumatic.  No posterior pharynx erythema.  No stridor or drooling.  No asymmetry  EYES: no scleral icterus  CV: regular rhythm, regular rate  RESPIRATORY: normal effort, lungs clear  ABDOMEN: soft, nontender, nondistended  MUSCULOSKELETAL: no deformity  NEURO: alert, moves all extremities, follows commands.  Ambulates with a walker with a steady gait.                                                             Labs  Recent Results (from the past 24 hour(s))   Respiratory Panel PCR w/COVID-19(SARS-CoV-2) JARRETT/AMPARO/ROYAL/PAD/COR/JERMAIN In-House, NP Swab in UTM/VTM, 2 HR TAT  - Swab, Nasopharynx    Collection Time: 12/21/23  9:42 PM    Specimen: Nasopharynx; Swab   Result Value Ref Range    ADENOVIRUS, PCR Not Detected Not Detected    Coronavirus 229E Not Detected Not Detected    Coronavirus HKU1 Not Detected Not Detected    Coronavirus NL63 Not Detected Not Detected    Coronavirus OC43 Not Detected Not Detected    COVID19 Not Detected Not Detected - Ref. Range    Human Metapneumovirus Not Detected Not Detected    Human Rhinovirus/Enterovirus Not Detected Not Detected    Influenza A PCR Not Detected Not Detected    Influenza B PCR Not Detected Not Detected    Parainfluenza Virus 1 Not Detected Not Detected    Parainfluenza Virus 2 Not Detected Not Detected    Parainfluenza Virus 3 Not Detected Not Detected    Parainfluenza Virus 4 Not Detected Not Detected    RSV, PCR Not Detected Not Detected    Bordetella pertussis pcr Not Detected Not Detected    Bordetella parapertussis PCR Not Detected Not Detected    Chlamydophila pneumoniae PCR Not Detected Not Detected    Mycoplasma pneumo by PCR Not Detected Not Detected   Rapid Strep A Screen - Swab, Throat    Collection Time: 12/21/23  9:42 PM    Specimen: Throat; Swab   Result Value Ref Range    Strep A Ag Negative Negative   Comprehensive Metabolic Panel    Collection Time: 12/21/23 11:39 PM    Specimen: Blood   Result Value Ref Range    Glucose 102 (H) 65 - 99 mg/dL    BUN 24 (H) 8 - 23 mg/dL    Creatinine 0.97 0.76 - 1.27 mg/dL    Sodium 142 136 - 145 mmol/L    Potassium 4.1 3.5 - 5.2 mmol/L    Chloride 107 98 - 107 mmol/L    CO2 24.5 22.0 - 29.0 mmol/L    Calcium 9.1 8.6 - 10.5 mg/dL    Total Protein 6.4 6.0 - 8.5 g/dL    Albumin 4.0 3.5 - 5.2 g/dL    ALT (SGPT) 15 1 - 41 U/L    AST (SGOT) 19 1 - 40 U/L    Alkaline Phosphatase 60 39 - 117 U/L    Total Bilirubin 0.2 0.0 - 1.2 mg/dL    Globulin 2.4 gm/dL    A/G Ratio 1.7 g/dL    BUN/Creatinine Ratio 24.7 7.0 - 25.0    Anion Gap 10.5 5.0 - 15.0 mmol/L    eGFR 76.5 >60.0 mL/min/1.73   High  Sensitivity Troponin T    Collection Time: 12/21/23 11:39 PM    Specimen: Blood   Result Value Ref Range    HS Troponin T 65 (C) <22 ng/L   CBC Auto Differential    Collection Time: 12/21/23 11:39 PM    Specimen: Blood   Result Value Ref Range    WBC 6.67 3.40 - 10.80 10*3/mm3    RBC 4.20 4.14 - 5.80 10*6/mm3    Hemoglobin 11.3 (L) 13.0 - 17.7 g/dL    Hematocrit 34.8 (L) 37.5 - 51.0 %    MCV 82.9 79.0 - 97.0 fL    MCH 26.9 26.6 - 33.0 pg    MCHC 32.5 31.5 - 35.7 g/dL    RDW 16.4 (H) 12.3 - 15.4 %    RDW-SD 48.1 37.0 - 54.0 fl    MPV 9.6 6.0 - 12.0 fL    Platelets 229 140 - 450 10*3/mm3    Neutrophil % 57.1 42.7 - 76.0 %    Lymphocyte % 30.9 19.6 - 45.3 %    Monocyte % 11.2 5.0 - 12.0 %    Eosinophil % 0.0 (L) 0.3 - 6.2 %    Basophil % 0.7 0.0 - 1.5 %    Immature Grans % 0.1 0.0 - 0.5 %    Neutrophils, Absolute 3.80 1.70 - 7.00 10*3/mm3    Lymphocytes, Absolute 2.06 0.70 - 3.10 10*3/mm3    Monocytes, Absolute 0.75 0.10 - 0.90 10*3/mm3    Eosinophils, Absolute 0.00 0.00 - 0.40 10*3/mm3    Basophils, Absolute 0.05 0.00 - 0.20 10*3/mm3    Immature Grans, Absolute 0.01 0.00 - 0.05 10*3/mm3    nRBC 0.0 0.0 - 0.2 /100 WBC   ECG 12 Lead Chest Pain    Collection Time: 12/21/23 11:56 PM   Result Value Ref Range    QT Interval 438 ms    QTC Interval 442 ms   High Sensitivity Troponin T 2Hr    Collection Time: 12/22/23  1:34 AM    Specimen: Blood   Result Value Ref Range    HS Troponin T 81 (C) <22 ng/L    Troponin T Delta 16 (C) >=-4 - <+4 ng/L       Radiology  No Radiology Exams Resulted Within Past 24 Hours    Medical Decision Making:  ED Course as of 12/22/23 0229   Thu Dec 21, 2023   2200 Raipid strep, RVP negative.  History is vague.  Discussed with my supervising physician.  Will get ECG, troponins x 2, cbc, cmp, cxr to further evaluate. [RC]   Fri Dec 22, 2023   0010 WBC: 6.67 [RC]   0010 RBC: 4.20 [RC]   0010 Hemoglobin(!): 11.3 [RC]   0010 Hematocrit(!): 34.8 [RC]   0010 Platelets: 229 [RC]   0021 HS Troponin T(!!):  65 [RC]   0027 EKG          EKG time: 2356  Rhythm/Rate: Normal sinus, rate 61  P waves and MT: Normal P, normal MT  QRS, axis: Narrow QRS, normal axis  ST and T waves: No acute    Independently Interpreted by me  Not significantly changed compared to prior 10/18/2023   [TR]   0050 HS Troponin T(!!): 65  This was in the 30s times 5 months ago [RC]   0209 Troponins and delta change noted.  325 ASA ordered at this time.  Will place a call to the hospitalist lalo admission for further evaluation.  Patient is resting quietly in no acute distress. [RC]      ED Course User Index  [RC] Erlin Hu III, PA  [TR] Malik Benedict MD       Clinical Scores:              The patient presents with an acute sore throat.  He has no signs of cardiac or pulmonary symptoms.  Plan to obtain strep swab as well as viral testing.  He appears well without any acute distress.  My differential diagnosis includes viral syndrome, strep, mono, acute coronary syndrome, and others.    Procedures:  Procedures              PPE: The patient wore a mask and I wore an N95 mask throughout the entire patient encounter.          Diagnosis  Final diagnoses:   Sore throat   Elevated troponin       Note Disclaimer: At [facility], we believe that sharing information builds trust and better relationships. You are receiving this note because you recently visited [facility]. It is possible you will see health information before a provider has talked with you about it. This kind of information can be easy to misunderstand. To help you fully understand what it means for your health, we urge you to discuss this note with your provider.       Malik Benedict MD  12/22/23 0028       Malik Benedict MD  12/22/23 0143       Malik Benedict MD  12/22/23 0229

## 2023-12-22 NOTE — OUTREACH NOTE
Prep Survey      Flowsheet Row Responses   [facility] facility patient discharged from? [place]   Is LACE score < 7 ? No   Eligibility [facility]   Date of Admission 12/21/23   Date of Discharge 12/22/23   Discharge Disposition Home or Self Care   Discharge diagnosis elevated troponin, stress test negative   Does the patient have one of the following disease processes/diagnoses(primary or secondary)? Other   Does the patient have Home health ordered? No   Is there a DME ordered? No   General alerts for this patient [facility]   Prep survey completed? Yes            Melly GUILLAUME - Registered Nurse

## 2023-12-22 NOTE — H&P
HISTORY AND PHYSICAL   [facility]        Date of Admission: 2023  Patient Identification:  Name: Tony Leonard  Age: 83 y.o.  Sex: male  :  1940  MRN: [id number]                     Primary Care Physician: Harpreet Kate MD    Chief Complaint: Sore throat    History of Present Illness:   Mr. Leonard is an 83-year-old male who ambulates with a rollator and also utilizes various nebulized meds for his COPD as well as nocturnal oxygen in which she utilizes 2 L via nasal cannula.  He states he has not had any cardiopulmonary complaints going on over the last couple days to weeks.  He denies any chest pain palpitation cough or shortness of breath.  He actually came to the ER for complaint of a sore throat over the last couple days.  He denies any nausea vomiting dizziness loss of consciousness or focal loss of function.  Denies any issues with generalized weakness or any known sick contacts.  Denies fever chills or night sweats.  Further workup in the ER demonstrated negative strep and RVP and chest x-ray though patient had an incidental finding of elevated troponins and patient was admitted to [facility] with cardiology consultation pending.  He also received 325 mg of aspirin and normally maintains on a baby aspirin.  Denies any use of anticoagulation prior to admission/formally on [facility].    Past Medical History:  Past Medical History:   Diagnosis Date    ADHD (attention deficit hyperactivity disorder)     Bronchiectasis     CHF (congestive heart failure)     Colon polyp     COPD (chronic obstructive pulmonary disease)     Diverticulosis     Hard of hearing     On home oxygen therapy     2 LITERS    Pneumonia     Prostate cancer 2019    Spinal stenosis, lumbar region with neurogenic claudication 2022     Past Surgical History:  Past Surgical History:   Procedure Laterality Date    BRONCHOSCOPY N/A 2016    Procedure: BRONCHOSCOPY with BAL of right lower lobe and left  lower  lobe.  ;  Surgeon: Nando Diaz MD;  Location: [facility] ENDOSCOPY;  Service:     BRONCHOSCOPY N/A 4/28/2017    Procedure: BRONCHOSCOPY;  Surgeon: Katharina Salter MD;  Location: [facility] ENDOSCOPY;  Service:     BRONCHOSCOPY Bilateral 6/29/2017    Procedure: BRONCHOSCOPY WITH WASHINGS;  Surgeon: Katharina Salter MD;  Location: [facility] ENDOSCOPY;  Service:     BRONCHOSCOPY N/A 1/22/2018    Procedure: BRONCHOSCOPY AT BEDSIDE with BAL;  Surgeon: Katharina Salter MD;  Location: [facility] ENDOSCOPY;  Service:     BRONCHOSCOPY N/A 1/30/2018    Procedure: BRONCHOSCOPY with BAL and washing;  Surgeon: Shreyas Wild MD;  Location: [facility] ENDOSCOPY;  Service:     BRONCHOSCOPY Bilateral 4/24/2018    Procedure: BRONCHOSCOPY WITH WASHING;  Surgeon: Katharina Salter MD;  Location: [facility] ENDOSCOPY;  Service: Pulmonary    BRONCHOSCOPY N/A 12/28/2019    Procedure: BRONCHOSCOPY with bilateral washing;  Surgeon: Katharina Salter MD;  Location: [facility] ENDOSCOPY;  Service: Pulmonary    BRONCHOSCOPY Bilateral 1/4/2023    Procedure: BRONCHOSCOPY with lavage;  Surgeon: Katharina Salter MD;  Location: [facility] ENDOSCOPY;  Service: Pulmonary;  Laterality: Bilateral;  mucus plugging    CARDIAC CATHETERIZATION N/A 1/29/2023    Procedure: Left Heart Cath;  Surgeon: Johnnie Ang MD;  Location: [facility] CATH INVASIVE LOCATION;  Service: Cardiology;  Laterality: N/A;    CARDIAC CATHETERIZATION N/A 1/29/2023    Procedure: Coronary angiography;  Surgeon: Johnnie Ang MD;  Location: [facility] CATH INVASIVE LOCATION;  Service: Cardiology;  Laterality: N/A;    COLONOSCOPY  05/17/2013    eh, ih, tort, sig tics    COLONOSCOPY N/A 5/10/2019    Non-thrombosed external hemorrhoids found on perianal exam, Diverticulosis, Tortuous colon, One 5 mm polyp in the mid ascending colon, IH. Path: Tubular adenoma.     COLONOSCOPY N/A 9/24/2022    Procedure: COLONOSCOPY to cecum and TI with argon plasma coagulation.;  Surgeon: Bruce Black MD;  Location: [facility]  ENDOSCOPY;  Service: Gastroenterology;  Laterality: N/A;  pre- GI bleeding  post- radiation proctitis, diverticulosis    ENDOSCOPY  03/19/2015    z line irreg, hh    ENDOSCOPY N/A 9/24/2022    Procedure: ESOPHAGOGASTRODUODENOSCOPY;  Surgeon: Bruce Black MD;  Location: [facility] ENDOSCOPY;  Service: Gastroenterology;  Laterality: N/A;  pre- GI bleeding  post- esophagitis, hiatal hernia    HIP OPEN REDUCTION Right 1/17/2018    Procedure: HIP OPEN REDUCTION INTERNAL FIXATION WITH DYNAMIC HIP SCREW;  Surgeon: Les Black MD;  Location: [facility] MAIN OR;  Service:     SPINE SURGERY      TONSILLECTOMY      TOTAL HIP ARTHROPLASTY REVISION Right 9/23/2019    Procedure: HIP  REVISION RIGHT;  Surgeon: Isauro Warner MD;  Location: [facility] MAIN OR;  Service: Orthopedics      Home Meds:  Facility-Administered Medications Prior to Admission   Medication Dose Route Frequency Provider Last Rate Last Admin    aspirin chewable tablet 81 mg  81 mg Oral Once Tenisha Xavier APRN         Medications Prior to Admission   Medication Sig Dispense Refill Last Dose    albuterol (PROVENTIL) (2.5 MG/3ML) 0.083% nebulizer solution Take 2.5 mg by nebulization Every 6 (Six) Hours As Needed for Wheezing. 360 mL 3     apixaban (ELIQUIS) 5 MG tablet tablet Take 1 tablet by mouth 2 (Two) Times a Day. (Patient not taking: Reported on 5/23/2023) 60 tablet 5     azelastine (ASTELIN) 0.1 % nasal spray 2 sprays into the nostril(s) as directed by provider 2 (Two) Times a Day. Use in each nostril as directed 30 mL 0     budesonide (PULMICORT) 180 MCG/ACT inhaler Inhale 2 puffs 2 (Two) Times a Day. (Patient not taking: Reported on 5/23/2023) 1 each 3     calcium carbonate (TUMS) 500 MG chewable tablet Chew 1 tablet As Needed for Indigestion or Heartburn.       clotrimazole-betamethasone (Lotrisone) 1-0.05 % cream Apply 1 application topically to the appropriate area as directed 2 (Two) Times a Day. Do not apply to scrotum 45 g 1     dicyclomine  (BENTYL) 10 MG capsule Take 1 capsule by mouth 3 (Three) Times a Day As Needed (abdominal bloating). 30 capsule 0     Fasenra Pen 30 MG/ML solution auto-injector        FLUoxetine (PROzac) 20 MG capsule TAKE 1 CAPSULE DAILY 90 capsule 3     fluticasone (FLONASE) 50 MCG/ACT nasal spray 2 sprays by Each Nare route Daily. 11.1 mL 1     furosemide (LASIX) 40 MG tablet Take 1 tablet by mouth Daily.       gabapentin (NEURONTIN) 300 MG capsule Take 1 capsule by mouth 3 (Three) Times a Day. 270 capsule 1     lidocaine (LIDODERM) 5 % Place 1 patch on the skin as directed by provider Daily. Remove & Discard patch within 12 hours or as directed by MD 6 each 0     Multiple Vitamins-Minerals (MULTIVITAMIN ADULT PO) Take 1 tablet by mouth Daily.       ondansetron ODT (ZOFRAN-ODT) 4 MG disintegrating tablet Place 1 tablet on the tongue Every 8 (Eight) Hours As Needed for Nausea or Vomiting (nausea/vomiting). 15 tablet 0     pantoprazole (PROTONIX) 40 MG EC tablet TAKE 1 TABLET DAILY ON EMPTY STOMACH HALF AN HOUR BEFORE MEAL 90 tablet 3     potassium chloride (K-DUR,KLOR-CON) 20 MEQ CR tablet Take 1 tablet by mouth Daily.       rOPINIRole (REQUIP) 0.5 MG tablet Take 1 tablet by mouth Every Night. Take 1 hour before bedtime. (Patient not taking: Reported on 9/7/2023) 90 tablet 1     sodium chloride 7 % nebulizer solution nebulizer solution Inhale 1 vial 2 (Two) Times a Day.       terbinafine (lamiSIL) 250 MG tablet Take 1 tablet by mouth Daily. (Patient not taking: Reported on 5/23/2023)       tiZANidine (ZANAFLEX) 2 MG tablet Take 1 tablet by mouth At Night As Needed for Muscle Spasms. 30 tablet 1     Umeclidinium-Vilanterol (ANORO ELLIPTA) 62.5-25 MCG/ACT aerosol powder  inhaler Inhale 1 puff Daily.          Allergies:  Allergies   Allergen Reactions    Daliresp [Roflumilast] Anaphylaxis    Latex Rash    Sulfa Antibiotics Rash     Immunizations:  Immunization History   Administered Date(s) Administered    COVID-19 (PFIZER) Purple  "Cap Monovalent 01/15/2021, 2021, 10/26/2021    Covid-19 (Pfizer) Gray Cap Monovalent 2022    Fluzone High Dose =>65 Years ([facility] ONLY) 2013, 2019, 2020    Influenza Seasonal Injectable 10/01/2021    Influenza, Unspecified 2017, 10/01/2021    Pneumococcal Conjugate 13-Valent (PCV13) 2017    Pneumococcal Polysaccharide (PPSV23) 10/10/2003     Social History:   Social History     Social History Narrative    Not on file     Social History     Socioeconomic History    Marital status: Single   Tobacco Use    Smoking status: Never    Smokeless tobacco: Never   Vaping Use    Vaping Use: Never used   Substance and Sexual Activity    Alcohol use: No     Comment: red wine occassional    Drug use: No    Sexual activity: Defer       Family History:  Family History   Problem Relation Age of Onset    Thyroid disease Mother     No Known Problems Father     Malig Hyperthermia Neg Hx         Review of Systems  See history of present illness and past medical history.  Patient denies fever chills night sweats nausea vomiting diarrhea chest pain palpitation cough shortness of breath above baseline altered mentation loss of consciousness.  Remainder of ROS is negative.    Objective:  T Max 24 hrs: Temp (24hrs), Av.9 °F (36.6 °C), Min:97.9 °F (36.6 °C), Max:98 °F (36.7 °C)    Vitals Ranges:   Temp:  [97.9 °F (36.6 °C)-98 °F (36.7 °C)] 97.9 °F (36.6 °C)  Heart Rate:  [] 96  Resp:  [16-18] 16  BP: (127-141)/(60-87) 137/60      Exam:  /60 (BP Location: Right arm, Patient Position: Lying)   Pulse 96   Temp 97.9 °F (36.6 °C) (Oral)   Resp 16   Ht 180.3 cm (71\")   Wt 74.9 kg (165 lb 3.2 oz)   SpO2 99%   BMI 23.04 kg/m²     General Appearance:    Alert, cooperative, elderly and frail though alert and oriented x 3 and a good historian, conversational and pleasant and certainly nontoxic.  No family present at bedside though rollator present   Head:    Normocephalic, without obvious " abnormality, atraumatic   Eyes:    PERRL, conjunctivae/corneas clear, EOM's intact, both eyes   Ears:    Normal external ear canals, both ears   Nose:   Nares normal, septum midline, mucosa normal, no drainage    or sinus tenderness   Throat:   Lips, mucosa, and tongue normal.  No oral lesions   Neck:   Supple, no LAD meningismus or JVD   Back:   Kyphotic   Lungs:     Clear to auscultation bilaterally, respirations unlabored   Chest Wall:    No tenderness or deformity    Heart:    Regular rate and rhythm, S1 and S2 normal   Abdomen:     Soft, nontender, bowel sounds active all four quads   Extremities: Probable underlying PVD like changes with dry skin and baseline edema with mild aspects of pitting   Pulses:   2+ and symmetric all extremities       Lymph nodes:   Cervical, supraclavicular nodes normal   Neurologic:   CNII-XII intact, ambulating independently with rollator      .    Data Review:  Labs in chart were reviewed.             Imaging Results (All)       Procedure Component Value Units Date/Time    XR Chest 1 View [[id number]] Collected: 12/22/23 0630     Updated: 12/22/23 0634    Narrative:      XR CHEST 1 VW-12/22/2023     HISTORY: Chest pain.     The heart size is within normal limits. Lungs are underinflated. There  is slight elevation of the right hemidiaphragm and there may be some  minimal atelectasis of the right base. Lungs are otherwise clear. Lower  cervical fusion plate is seen.       Impression:      1. Slight elevation of the right hemidiaphragm similar to the 10/18/2023  study. There appears to be some minimal atelectasis of the right lung  base.        This report was finalized on 12/22/2023 6:31 AM by Dr. Roberto Fernandes M.D on Workstation: [id number]                 Assessment:  Active Hospital Problems    Diagnosis  POA    **Elevated troponin [R79.89]  Yes    Chronic anticoagulation [Z79.01]  Not Applicable    Paroxysmal atrial fibrillation [I48.0]  Yes    HTN (hypertension) [I10]  Yes     On home oxygen therapy [Z99.81]  Not Applicable    Mild malnutrition [E44.1]  Yes    Dyslipidemia [E78.5]  Yes    Gastroesophageal reflux disease [K21.9]  Yes      Resolved Hospital Problems   No resolved problems to display.       Plan:    Elevated troponin with past history of PAF not on AC prior to admission though there has been issues with noncompliance in the past and Eliquis was noted on his MAR but it also stated he was not taking this   -Cardiology consulted for additional recommendations -if no plans for additional n.p.o. necessity, have written for regular diet   -Has had absolutely no cardiopulmonary complaints lately and this was an incidental finding on admission as he came here for sore throat   -Volume status seems a little bit overloaded but patient states legs are at baseline and he is on daily Lasix which was continued   -Baby aspirin resumed, given 325 mg in ER   -Utilizes oxygen 2 L nightly via nasal cannula      COPD without acute exacerbation   -Continue nebulized meds      GERD on PPI    Peripheral neuropathy on gabapentin    Sore throat with negative workup both objectively and on exam   -Probable element of postnasal drip and continue Flonase    Lac-Hydrin for dry skin to lower extremities    SCDs for additional prophylaxis        Further recommendations to follow as clinical course unfolds.  Patient ambulates with a rollator and states he is stable on his feet and has had absolutely no complaints of dizziness or chest pain and pending cardiology input and assistance, we could consider possible discharge home later today versus observation    Chad Olguin MD  12/22/2023  07:18 EST

## 2023-12-22 NOTE — CASE MANAGEMENT/SOCIAL WORK
Discharge Planning Assessment  [facility]     Patient Name: Tony Leonard  MRN: [id number]  Today's Date: 12/22/2023    Admit Date: 12/21/2023    Plan: Return to [address]   Discharge Needs Assessment    No documentation.                  Discharge Plan       Row Name 12/22/23 1536       Plan    Plan Return to [address]    Patient/Family in Agreement with Plan yes    Plan Comments CCP met with patient at bedside. Patient lives at [address]. He uses a rollator and cane and 2L 02 from [employer]. He has a nebulizer. [facility] He plans to return home at discharge and drove self. Patient denies needs. Facesheet confirmed. Patient enrolled in [facility]. Serjio DC RN CCP                  Continued Care and Services - Admitted Since 12/21/2023    Coordination has not been started for this encounter.       Selected Continued Care - Episodes Includes continued care and service providers with selected services from the active episodes listed below      High Risk Care Management Episode start date: 12/13/2022   There are no active outsourced providers for this episode.                 Expected Discharge Date and Time       Expected Discharge Date Expected Discharge Time    Dec 22, 2023            Demographic Summary    No documentation.                  Functional Status    No documentation.                  Psychosocial    No documentation.                  Abuse/Neglect    No documentation.                  Legal    No documentation.                  Substance Abuse    No documentation.                  Patient Forms    No documentation.                     Serjio Painting RN

## 2023-12-23 LAB — BACTERIA SPEC AEROBE CULT: NORMAL

## 2023-12-26 ENCOUNTER — TRANSITIONAL CARE MANAGEMENT TELEPHONE ENCOUNTER (OUTPATIENT)
Dept: CALL CENTER | Facility: HOSPITAL | Age: 83
End: 2023-12-26
Payer: MEDICARE

## 2023-12-26 ENCOUNTER — APPOINTMENT (OUTPATIENT)
Dept: GENERAL RADIOLOGY | Facility: HOSPITAL | Age: 83
DRG: 558 | End: 2023-12-26
Payer: MEDICARE

## 2023-12-26 ENCOUNTER — APPOINTMENT (OUTPATIENT)
Dept: CT IMAGING | Facility: HOSPITAL | Age: 83
DRG: 558 | End: 2023-12-26
Payer: MEDICARE

## 2023-12-26 ENCOUNTER — HOSPITAL ENCOUNTER (INPATIENT)
Facility: HOSPITAL | Age: 83
LOS: 3 days | Discharge: NURSING, REHAB OR LONG-TERM CARE | DRG: 558 | End: 2023-12-30
Attending: EMERGENCY MEDICINE | Admitting: HOSPITALIST
Payer: MEDICARE

## 2023-12-26 DIAGNOSIS — I10 PRIMARY HYPERTENSION: ICD-10-CM

## 2023-12-26 DIAGNOSIS — M62.82 NON-TRAUMATIC RHABDOMYOLYSIS: Primary | ICD-10-CM

## 2023-12-26 DIAGNOSIS — Z09 HOSPITAL DISCHARGE FOLLOW-UP: ICD-10-CM
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
## 2023-12-26 DIAGNOSIS — M54.40 CHRONIC LOW BACK PAIN WITH SCIATICA, SCIATICA LATERALITY UNSPECIFIED, UNSPECIFIED BACK PAIN LATERALITY: ICD-10-CM

## 2023-12-26 DIAGNOSIS — I71.40 ABDOMINAL AORTIC ANEURYSM (AAA) WITHOUT RUPTURE, UNSPECIFIED PART: ICD-10-CM

## 2023-12-26 DIAGNOSIS — I26.92 SADDLE EMBOLUS OF PULMONARY ARTERY, UNSPECIFIED CHRONICITY, UNSPECIFIED WHETHER ACUTE COR PULMONALE PRESENT: ICD-10-CM

## 2023-12-26 DIAGNOSIS — T07.XXXA MULTIPLE CONTUSIONS: ICD-10-CM

## 2023-12-26 DIAGNOSIS — E66.2 OBESITY WITH ALVEOLAR HYPOVENTILATION AND SERIOUS COMORBIDITY, UNSPECIFIED CLASSIFICATION: ICD-10-CM

## 2023-12-26 DIAGNOSIS — I50.32 CHRONIC DIASTOLIC CONGESTIVE HEART FAILURE: ICD-10-CM

## 2023-12-26 DIAGNOSIS — G89.29 CHRONIC LOW BACK PAIN WITH SCIATICA, SCIATICA LATERALITY UNSPECIFIED, UNSPECIFIED BACK PAIN LATERALITY: ICD-10-CM

## 2023-12-26 LAB
ALBUMIN SERPL-MCNC: 3.7 G/DL (ref 3.5–5.2)
ALBUMIN/GLOB SERPL: 1.5 G/DL
ALP SERPL-CCNC: 58 U/L (ref 39–117)
ALT SERPL W P-5'-P-CCNC: 26 U/L (ref 1–41)
ANION GAP SERPL CALCULATED.3IONS-SCNC: 9.9 MMOL/L (ref 5–15)
AST SERPL-CCNC: 54 U/L (ref 1–40)
BASOPHILS # BLD MANUAL: 0.12 10*3/MM3 (ref 0–0.2)
BASOPHILS NFR BLD MANUAL: 2.1 % (ref 0–1.5)
BILIRUB SERPL-MCNC: 0.6 MG/DL (ref 0–1.2)
BUN SERPL-MCNC: 17 MG/DL (ref 8–23)
BUN/CREAT SERPL: 18.3 (ref 7–25)
CALCIUM SPEC-SCNC: 8.7 MG/DL (ref 8.6–10.5)
CHLORIDE SERPL-SCNC: 104 MMOL/L (ref 98–107)
CK SERPL-CCNC: 1782 U/L (ref 20–200)
CO2 SERPL-SCNC: 22.1 MMOL/L (ref 22–29)
CREAT SERPL-MCNC: 0.93 MG/DL (ref 0.76–1.27)
DEPRECATED RDW RBC AUTO: 52 FL (ref 37–54)
EGFRCR SERPLBLD CKD-EPI 2021: 80.5 ML/MIN/1.73
ERYTHROCYTE [DISTWIDTH] IN BLOOD BY AUTOMATED COUNT: 16.8 % (ref 12.3–15.4)
GLOBULIN UR ELPH-MCNC: 2.4 GM/DL
GLUCOSE SERPL-MCNC: 85 MG/DL (ref 65–99)
HCT VFR BLD AUTO: 35.6 % (ref 37.5–51)
HGB BLD-MCNC: 11.3 G/DL (ref 13–17.7)
HYPOCHROMIA BLD QL: ABNORMAL
LYMPHOCYTES # BLD MANUAL: 1.08 10*3/MM3 (ref 0.7–3.1)
LYMPHOCYTES NFR BLD MANUAL: 15.5 % (ref 5–12)
MCH RBC QN AUTO: 26.7 PG (ref 26.6–33)
MCHC RBC AUTO-ENTMCNC: 31.7 G/DL (ref 31.5–35.7)
MCV RBC AUTO: 84.2 FL (ref 79–97)
MONOCYTES # BLD: 0.9 10*3/MM3 (ref 0.1–0.9)
NEUTROPHILS # BLD AUTO: 3.73 10*3/MM3 (ref 1.7–7)
NEUTROPHILS NFR BLD MANUAL: 63.9 % (ref 42.7–76)
NRBC BLD AUTO-RTO: 0.2 /100 WBC (ref 0–0.2)
NRBC SPEC MANUAL: 2.1 /100 WBC (ref 0–0.2)
OVALOCYTES BLD QL SMEAR: ABNORMAL
PLAT MORPH BLD: NORMAL
PLATELET # BLD AUTO: 208 10*3/MM3 (ref 140–450)
PMV BLD AUTO: 9.6 FL (ref 6–12)
POTASSIUM SERPL-SCNC: 3.9 MMOL/L (ref 3.5–5.2)
PROT SERPL-MCNC: 6.1 G/DL (ref 6–8.5)
RBC # BLD AUTO: 4.23 10*6/MM3 (ref 4.14–5.8)
SMUDGE CELLS BLD QL SMEAR: ABNORMAL
SODIUM SERPL-SCNC: 136 MMOL/L (ref 136–145)
VARIANT LYMPHS NFR BLD MANUAL: 18.6 % (ref 19.6–45.3)
WBC NRBC COR # BLD AUTO: 5.83 10*3/MM3 (ref 3.4–10.8)

## 2023-12-26 PROCEDURE — 72125 CT NECK SPINE W/O DYE: CPT

## 2023-12-26 PROCEDURE — 25810000003 SODIUM CHLORIDE 0.9 % SOLUTION: Performed by: HOSPITALIST

## 2023-12-26 PROCEDURE — 25010000002 MORPHINE PER 10 MG: Performed by: EMERGENCY MEDICINE

## 2023-12-26 PROCEDURE — 80053 COMPREHEN METABOLIC PANEL: CPT | Performed by: EMERGENCY MEDICINE

## 2023-12-26 PROCEDURE — 36415 COLL VENOUS BLD VENIPUNCTURE: CPT

## 2023-12-26 PROCEDURE — 73502 X-RAY EXAM HIP UNI 2-3 VIEWS: CPT

## 2023-12-26 PROCEDURE — 70450 CT HEAD/BRAIN W/O DYE: CPT

## 2023-12-26 PROCEDURE — G0378 HOSPITAL OBSERVATION PER HR: HCPCS

## 2023-12-26 PROCEDURE — 85025 COMPLETE CBC W/AUTO DIFF WBC: CPT | Performed by: EMERGENCY MEDICINE

## 2023-12-26 PROCEDURE — 73590 X-RAY EXAM OF LOWER LEG: CPT

## 2023-12-26 PROCEDURE — 99285 EMERGENCY DEPT VISIT HI MDM: CPT

## 2023-12-26 PROCEDURE — 25810000003 SODIUM CHLORIDE 0.9 % SOLUTION: Performed by: EMERGENCY MEDICINE

## 2023-12-26 PROCEDURE — 73552 X-RAY EXAM OF FEMUR 2/>: CPT

## 2023-12-26 PROCEDURE — 85007 BL SMEAR W/DIFF WBC COUNT: CPT | Performed by: EMERGENCY MEDICINE

## 2023-12-26 PROCEDURE — 25010000002 ONDANSETRON PER 1 MG: Performed by: EMERGENCY MEDICINE

## 2023-12-26 PROCEDURE — 82550 ASSAY OF CK (CPK): CPT | Performed by: EMERGENCY MEDICINE

## 2023-12-26 PROCEDURE — 73610 X-RAY EXAM OF ANKLE: CPT

## 2023-12-26 PROCEDURE — 73110 X-RAY EXAM OF WRIST: CPT

## 2023-12-26 RX ORDER — TIZANIDINE 4 MG/1
2 TABLET ORAL NIGHTLY PRN
Status: DISCONTINUED | OUTPATIENT
Start: 2023-12-26 | End: 2023-12-30 | Stop reason: HOSPADM

## 2023-12-26 RX ORDER — SODIUM CHLORIDE 9 MG/ML
100 INJECTION, SOLUTION INTRAVENOUS CONTINUOUS
Status: DISCONTINUED | OUTPATIENT
Start: 2023-12-26 | End: 2023-12-28

## 2023-12-26 RX ORDER — ACETAMINOPHEN 325 MG/1
650 TABLET ORAL EVERY 4 HOURS PRN
Status: DISCONTINUED | OUTPATIENT
Start: 2023-12-26 | End: 2023-12-30 | Stop reason: HOSPADM

## 2023-12-26 RX ORDER — ACETAMINOPHEN 650 MG/1
650 SUPPOSITORY RECTAL EVERY 4 HOURS PRN
Status: DISCONTINUED | OUTPATIENT
Start: 2023-12-26 | End: 2023-12-30 | Stop reason: HOSPADM

## 2023-12-26 RX ORDER — MORPHINE SULFATE 2 MG/ML
2 INJECTION, SOLUTION INTRAMUSCULAR; INTRAVENOUS ONCE
Status: COMPLETED | OUTPATIENT
Start: 2023-12-26 | End: 2023-12-26

## 2023-12-26 RX ORDER — SODIUM CHLORIDE 0.9 % (FLUSH) 0.9 %
10 SYRINGE (ML) INJECTION AS NEEDED
Status: DISCONTINUED | OUTPATIENT
Start: 2023-12-26 | End: 2023-12-30 | Stop reason: HOSPADM

## 2023-12-26 RX ORDER — ACETAMINOPHEN 160 MG/5ML
650 SOLUTION ORAL EVERY 4 HOURS PRN
Status: DISCONTINUED | OUTPATIENT
Start: 2023-12-26 | End: 2023-12-30 | Stop reason: HOSPADM

## 2023-12-26 RX ORDER — ALBUTEROL SULFATE 2.5 MG/3ML
2.5 SOLUTION RESPIRATORY (INHALATION) EVERY 6 HOURS PRN
Status: DISCONTINUED | OUTPATIENT
Start: 2023-12-26 | End: 2023-12-30 | Stop reason: HOSPADM

## 2023-12-26 RX ORDER — PANTOPRAZOLE SODIUM 40 MG/1
40 TABLET, DELAYED RELEASE ORAL
Status: DISCONTINUED | OUTPATIENT
Start: 2023-12-26 | End: 2023-12-30 | Stop reason: HOSPADM

## 2023-12-26 RX ORDER — AMOXICILLIN 250 MG
2 CAPSULE ORAL 2 TIMES DAILY
Status: DISCONTINUED | OUTPATIENT
Start: 2023-12-26 | End: 2023-12-30 | Stop reason: HOSPADM

## 2023-12-26 RX ORDER — CALCIUM CARBONATE 500 MG/1
1 TABLET, CHEWABLE ORAL AS NEEDED
Status: DISCONTINUED | OUTPATIENT
Start: 2023-12-26 | End: 2023-12-30 | Stop reason: HOSPADM

## 2023-12-26 RX ORDER — GABAPENTIN 300 MG/1
300 CAPSULE ORAL 3 TIMES DAILY
Status: DISCONTINUED | OUTPATIENT
Start: 2023-12-26 | End: 2023-12-30 | Stop reason: HOSPADM

## 2023-12-26 RX ORDER — SODIUM CHLORIDE 0.9 % (FLUSH) 0.9 %
10 SYRINGE (ML) INJECTION EVERY 12 HOURS SCHEDULED
Status: DISCONTINUED | OUTPATIENT
Start: 2023-12-26 | End: 2023-12-30 | Stop reason: HOSPADM

## 2023-12-26 RX ORDER — DICYCLOMINE HYDROCHLORIDE 10 MG/1
10 CAPSULE ORAL 3 TIMES DAILY PRN
Status: DISCONTINUED | OUTPATIENT
Start: 2023-12-26 | End: 2023-12-30 | Stop reason: HOSPADM

## 2023-12-26 RX ORDER — ONDANSETRON 2 MG/ML
4 INJECTION INTRAMUSCULAR; INTRAVENOUS ONCE
Status: COMPLETED | OUTPATIENT
Start: 2023-12-26 | End: 2023-12-26

## 2023-12-26 RX ORDER — POLYETHYLENE GLYCOL 3350 17 G/17G
17 POWDER, FOR SOLUTION ORAL DAILY PRN
Status: DISCONTINUED | OUTPATIENT
Start: 2023-12-26 | End: 2023-12-30 | Stop reason: HOSPADM

## 2023-12-26 RX ORDER — ONDANSETRON 2 MG/ML
4 INJECTION INTRAMUSCULAR; INTRAVENOUS EVERY 6 HOURS PRN
Status: DISCONTINUED | OUTPATIENT
Start: 2023-12-26 | End: 2023-12-30 | Stop reason: HOSPADM

## 2023-12-26 RX ORDER — ONDANSETRON 4 MG/1
4 TABLET, ORALLY DISINTEGRATING ORAL EVERY 6 HOURS PRN
Status: DISCONTINUED | OUTPATIENT
Start: 2023-12-26 | End: 2023-12-30 | Stop reason: HOSPADM

## 2023-12-26 RX ORDER — ASPIRIN 81 MG/1
81 TABLET, CHEWABLE ORAL ONCE
Status: COMPLETED | OUTPATIENT
Start: 2023-12-26 | End: 2023-12-26

## 2023-12-26 RX ORDER — BISACODYL 5 MG/1
5 TABLET, DELAYED RELEASE ORAL DAILY PRN
Status: DISCONTINUED | OUTPATIENT
Start: 2023-12-26 | End: 2023-12-30 | Stop reason: HOSPADM

## 2023-12-26 RX ORDER — SODIUM CHLORIDE 9 MG/ML
40 INJECTION, SOLUTION INTRAVENOUS AS NEEDED
Status: DISCONTINUED | OUTPATIENT
Start: 2023-12-26 | End: 2023-12-30 | Stop reason: HOSPADM

## 2023-12-26 RX ORDER — FLUOXETINE HYDROCHLORIDE 20 MG/1
20 CAPSULE ORAL DAILY
Status: DISCONTINUED | OUTPATIENT
Start: 2023-12-26 | End: 2023-12-30 | Stop reason: HOSPADM

## 2023-12-26 RX ORDER — BISACODYL 10 MG
10 SUPPOSITORY, RECTAL RECTAL DAILY PRN
Status: DISCONTINUED | OUTPATIENT
Start: 2023-12-26 | End: 2023-12-30 | Stop reason: HOSPADM

## 2023-12-26 RX ADMIN — SODIUM CHLORIDE 1000 ML: 9 INJECTION, SOLUTION INTRAVENOUS at 09:06

## 2023-12-26 RX ADMIN — SODIUM CHLORIDE 100 ML/HR: 9 INJECTION, SOLUTION INTRAVENOUS at 14:41

## 2023-12-26 RX ADMIN — GABAPENTIN 300 MG: 300 CAPSULE ORAL at 20:16

## 2023-12-26 RX ADMIN — SENNOSIDES AND DOCUSATE SODIUM 2 TABLET: 50; 8.6 TABLET ORAL at 20:17

## 2023-12-26 RX ADMIN — PANTOPRAZOLE SODIUM 40 MG: 40 TABLET, DELAYED RELEASE ORAL at 14:40

## 2023-12-26 RX ADMIN — ONDANSETRON HYDROCHLORIDE 4 MG: 2 INJECTION, SOLUTION INTRAMUSCULAR; INTRAVENOUS at 09:36

## 2023-12-26 RX ADMIN — MORPHINE SULFATE 2 MG: 2 INJECTION, SOLUTION INTRAMUSCULAR; INTRAVENOUS at 09:36

## 2023-12-26 RX ADMIN — GABAPENTIN 300 MG: 300 CAPSULE ORAL at 17:30

## 2023-12-26 RX ADMIN — ASPIRIN 81 MG: 81 TABLET, CHEWABLE ORAL at 14:40

## 2023-12-26 NOTE — OUTREACH NOTE
Call Center TCM Note      Flowsheet Row Responses   [facility] patient discharged from? [place]   Does the patient have one of the following disease processes/diagnoses(primary or secondary)? Other   TCM attempt successful? No  [verbal release for Edward, son]   Unsuccessful attempts Attempt 1  [Noted that pt in ED at time of call attempt r/t fall,  unknown at this time if he is to be admitted]            Domenica Melendrez RN    12/26/2023, 08:33 EST

## 2023-12-26 NOTE — H&P
HISTORY AND PHYSICAL   [facility]        Patient Identification:  Name: Tony Leonard  Age: 83 y.o.  Sex: male  :  1940  MRN: [id number]                     Primary Care Physician: Harpreet Kate MD    Chief Complaint: Fall    History of Present Illness:       The patient is a 83 y.o. male who presents to the hospital from home via EMS complaining of a mechanical fall that occurred around 12 AM on the day of admission.  Patient was walking into the bathroom with his walker when he slipped and fell.  He landed on his right hip.  He is unsure if he hit his head but he denies loss of consciousness.  He was unable to get off the floor and laid there for several hours.  He also complains of pain in his neck, right wrist, right thigh, and right lower leg.  Denies chest pain, shortness of breath, abdominal pain, or numbness/tingling in his extremities.  He has had a right hip dislocation.  He was recently taken off of Eliquis.  He was seen in the ER and had multiple x-rays done which did not show any definite fractures.  His CPK was elevated and was felt to have some degree of rhabdo from muscular injury.  The patient was started on some IV fluids and admitted for further evaluation treatment.    Past Medical History:  Past Medical History:   Diagnosis Date    ADHD (attention deficit hyperactivity disorder)     Bronchiectasis     CHF (congestive heart failure)     Colon polyp     COPD (chronic obstructive pulmonary disease)     Diverticulosis     Hard of hearing     On home oxygen therapy     2 LITERS    Pneumonia     Prostate cancer 2019    Spinal stenosis, lumbar region with neurogenic claudication 2022     Past Surgical History:  Past Surgical History:   Procedure Laterality Date    BRONCHOSCOPY N/A 2016    Procedure: BRONCHOSCOPY with BAL of right lower lobe and left  lower lobe.  ;  Surgeon: Nando Diaz MD;  Location: [facility] ENDOSCOPY;  Service:     BRONCHOSCOPY N/A  4/28/2017    Procedure: BRONCHOSCOPY;  Surgeon: Katharina Salter MD;  Location: [facility] ENDOSCOPY;  Service:     BRONCHOSCOPY Bilateral 6/29/2017    Procedure: BRONCHOSCOPY WITH WASHINGS;  Surgeon: Katharina Salter MD;  [facility] END[facility];  Service:     BRONCHOSCOPY N/A 1/22/2018    Procedure: BRONCHOSCOPY AT BEDSIDE with BAL;  Surgeon: Katharina Salter MD;  [facility];  Service:     BRONCHOSCOPY N/A 1/30/2018    Procedure: BRONCHOSCOPY with BAL and washing;  Surgeon: Shreyas Wild MD;  [facility];  Service:     BRONCHOSCOPY Bilateral 4/24/2018    Procedure: BRONCHOSCOPY WITH WASHING;  Surgeon: Katharina Salter MD;  [facility] END[facility];  Service: Pulmonary    BRONCHOSCOPY N/A 12/28/2019    Procedure: BRONCHOSCOPY with bilateral washing;  Surgeon: Katharina Salter MD;  Location: [facility] ENDOSCOPY;  Service: Pulmonary    BRONCHOSCOPY Bilateral 1/4/2023    Procedure: BRONCHOSCOPY with lavage;  Surgeon: Katharina Salter MD;  Location: [facility] ENDOSCOPY;  Service: Pulmonary;  Laterality: Bilateral;  mucus plugging    CARDIAC CATHETERIZATION N/A 1/29/2023    Procedure: Left Heart Cath;  Surgeon: Johnnie Ang MD;  Location: [facility] CATH INVASIVE LOCATION;  Service: Cardiology;  Laterality: N/A;    CARDIAC CATHETERIZATION N/A 1/29/2023    Procedure: Coronary angiography;  Surgeon: Johnnie Ang MD;  Location: [facility] CATH INVASIVE LOCATION;  Service: Cardiology;  Laterality: N/A;    COLONOSCOPY  05/17/2013    eh, ih, tort, sig tics    COLONOSCOPY N/A 5/10/2019    Non-thrombosed external hemorrhoids found on perianal exam, Diverticulosis, Tortuous colon, One 5 mm polyp in the mid ascending colon, IH. Path: Tubular adenoma.     COLONOSCOPY N/A 9/24/2022    Procedure: COLONOSCOPY to cecum and TI with argon plasma coagulation.;  Surgeon: Bruce Black MD;  Location: [facility] ENDOSCOPY;  Service: Gastroenterology;  Laterality: N/A;  pre- GI bleeding  post- radiation proctitis,  diverticulosis    ENDOSCOPY  03/19/2015    z line irreg, hh    ENDOSCOPY N/A 9/24/2022    Procedure: ESOPHAGOGASTRODUODENOSCOPY;  Surgeon: Bruce Black MD;  [facility];  Service: Gastroenterology;  Laterality: N/A;  pre- GI bleeding  post- esophagitis, hiatal hernia    HIP OPEN REDUCTION Right 1/17/2018    Procedure: HIP OPEN REDUCTION INTERNAL FIXATION WITH DYNAMIC HIP SCREW;  Surgeon: Les Black MD;  Location: [facility] MAIN OR;  Service:     SPINE SURGERY      TONSILLECTOMY      TOTAL HIP ARTHROPLASTY REVISION Right 9/23/2019    Procedure: HIP  REVISION RIGHT;  Surgeon: Isauro Warner MD;  Location: [facility] MAIN OR;  Service: Orthopedics      Home Meds:  Facility-Administered Medications Prior to Admission   Medication Dose Route Frequency Provider Last Rate Last Admin    aspirin chewable tablet 81 mg  81 mg Oral Once Tenisha Xavier APRN         Medications Prior to Admission   Medication Sig Dispense Refill Last Dose    albuterol (PROVENTIL) (2.5 MG/3ML) 0.083% nebulizer solution Take 2.5 mg by nebulization Every 6 (Six) Hours As Needed for Wheezing. 360 mL 3 12/25/2023    ammonium lactate (AMLACTIN) 12 % cream Apply 1 g topically to the appropriate area as directed Every 12 (Twelve) Hours As Needed for Dry Skin. 140 g 0 12/25/2023    azelastine (ASTELIN) 0.1 % nasal spray 2 sprays into the nostril(s) as directed by provider 2 (Two) Times a Day. Use in each nostril as directed 30 mL 0 Past Month    calcium carbonate (TUMS) 500 MG chewable tablet Chew 1 tablet As Needed for Indigestion or Heartburn.   Past Month    FLUoxetine (PROzac) 20 MG capsule TAKE 1 CAPSULE DAILY 90 capsule 3 12/25/2023    fluticasone (FLONASE) 50 MCG/ACT nasal spray 2 sprays by Each Nare route Daily. 11.1 mL 1 12/25/2023    gabapentin (NEURONTIN) 300 MG capsule Take 1 capsule by mouth 3 (Three) Times a Day. 270 capsule 1 12/25/2023    lidocaine (LIDODERM) 5 % Place 1 patch on the skin as directed by provider  Daily. Remove & Discard patch within 12 hours or as directed by MD 6 each 0 12/25/2023    Multiple Vitamins-Minerals (MULTIVITAMIN ADULT PO) Take 1 tablet by mouth Daily.   12/25/2023    pantoprazole (PROTONIX) 40 MG EC tablet TAKE 1 TABLET DAILY ON EMPTY STOMACH HALF AN HOUR BEFORE MEAL 90 tablet 3 Past Week    potassium chloride (K-DUR,KLOR-CON) 20 MEQ CR tablet Take 1 tablet by mouth Daily.   12/25/2023    sodium chloride 7 % nebulizer solution nebulizer solution Inhale 1 vial 2 (Two) Times a Day.   12/25/2023    tiZANidine (ZANAFLEX) 2 MG tablet Take 1 tablet by mouth At Night As Needed for Muscle Spasms. 30 tablet 1 12/25/2023    Umeclidinium-Vilanterol (ANORO ELLIPTA) 62.5-25 MCG/ACT aerosol powder  inhaler Inhale 1 puff Daily.   12/25/2023    clotrimazole-betamethasone (Lotrisone) 1-0.05 % cream Apply 1 application topically to the appropriate area as directed 2 (Two) Times a Day. Do not apply to scrotum (Patient not taking: Reported on 12/26/2023) 45 g 1 Not Taking    dicyclomine (BENTYL) 10 MG capsule Take 1 capsule by mouth 3 (Three) Times a Day As Needed (abdominal bloating). 30 capsule 0 More than a month    furosemide (LASIX) 40 MG tablet Take 1 tablet by mouth Daily. (Patient not taking: Reported on 12/26/2023)   Not Taking    ondansetron ODT (ZOFRAN-ODT) 4 MG disintegrating tablet Place 1 tablet on the tongue Every 8 (Eight) Hours As Needed for Nausea or Vomiting (nausea/vomiting). (Patient not taking: Reported on 12/26/2023) 15 tablet 0 Not Taking     Current meds    Current Facility-Administered Medications:     acetaminophen (TYLENOL) tablet 650 mg, 650 mg, Oral, Q4H PRN **OR** acetaminophen (TYLENOL) 160 MG/5ML oral solution 650 mg, 650 mg, Oral, Q4H PRN **OR** acetaminophen (TYLENOL) suppository 650 mg, 650 mg, Rectal, Q4H PRN, Eduardo Hanks MD    albuterol (PROVENTIL) nebulizer solution 0.083% 2.5 mg/3mL, 2.5 mg, Nebulization, Q6H PRN, Eduardo Hanks MD    aspirin chewable tablet 81 mg, 81 mg,  Oral, Once, Eduardo Hanks MD    sennosides-docusate (PERICOLACE) 8.6-50 MG per tablet 2 tablet, 2 tablet, Oral, BID **AND** polyethylene glycol (MIRALAX) packet 17 g, 17 g, Oral, Daily PRN **AND** bisacodyl (DULCOLAX) EC tablet 5 mg, 5 mg, Oral, Daily PRN **AND** bisacodyl (DULCOLAX) suppository 10 mg, 10 mg, Rectal, Daily PRN, Eduardo Hanks MD    calcium carbonate (TUMS) chewable tablet 500 mg (200 mg elemental), 1 tablet, Oral, PRN, Eduardo Hanks MD    dicyclomine (BENTYL) capsule 10 mg, 10 mg, Oral, TID PRN, Eduardo Hanks MD    FLUoxetine (PROzac) capsule 20 mg, 20 mg, Oral, Daily, Eduardo Hanks MD    gabapentin (NEURONTIN) capsule 300 mg, 300 mg, Oral, TID, Eduardo Hanks MD    ondansetron ODT (ZOFRAN-ODT) disintegrating tablet 4 mg, 4 mg, Oral, Q6H PRN **OR** ondansetron (ZOFRAN) injection 4 mg, 4 mg, Intravenous, Q6H PRN, Eduardo Hanks MD    pantoprazole (PROTONIX) EC tablet 40 mg, 40 mg, Oral, QAM AC, Eduardo Hanks MD    sodium chloride 0.9 % flush 10 mL, 10 mL, Intravenous, Q12H, Eduardo Hanks MD    sodium chloride 0.9 % flush 10 mL, 10 mL, Intravenous, PRN, Eduardo Hanks MD    sodium chloride 0.9 % infusion 40 mL, 40 mL, Intravenous, PRN, Eduardo Hanks MD    sodium chloride 0.9 % infusion, 100 mL/hr, Intravenous, Continuous, Eduardo Hanks MD    tiZANidine (ZANAFLEX) tablet 2 mg, 2 mg, Oral, Nightly PRN, Eduardo Hanks MD  Allergies:  Allergies   Allergen Reactions    Daliresp [Roflumilast] Anaphylaxis    Latex Rash    Sulfa Antibiotics Rash     Immunizations:  Immunization History   Administered Date(s) Administered    COVID-19 (PFIZER) Purple Cap Monovalent 01/15/2021, 02/05/2021, 10/26/2021    Covid-19 (Pfizer) Gray Cap Monovalent 08/05/2022    Fluzone High Dose =>65 Years ([place] ONLY) 09/25/2013, 09/17/2019, 09/11/2020    Influenza Seasonal Injectable 10/01/2021    Influenza, Unspecified 09/13/2017, 10/01/2021    Pneumococcal Conjugate 13-Valent (PCV13) 01/11/2017    Pneumococcal  "Polysaccharide (PPSV23) 10/10/2003     Social History:   Social History     Social History Narrative    Not on file     Social History     Socioeconomic History    Marital status: Single   Tobacco Use    Smoking status: Never    Smokeless tobacco: Never   Vaping Use    Vaping Use: Never used   Substance and Sexual Activity    Alcohol use: No     Comment: red wine occassional    Drug use: No    Sexual activity: Defer       Family History:  Family History   Problem Relation Age of Onset    Thyroid disease Mother     No Known Problems Father     Malig Hyperthermia Neg Hx         Review of Systems  See history of present illness and past medical history.  Patient denies headache, dizziness, syncope, falls, trauma, change in vision, change in hearing, change in taste, changes in weight, changes in appetite, focal weakness, numbness, or paresthesia.  Patient denies chest pain, palpitations, dyspnea, orthopnea, PND, cough, sinus pressure, rhinorrhea, epistaxis, hemoptysis, nausea, vomiting, hematemesis, diarrhea, constipation or hematochezia. Denies cold or heat intolerance, polydipsia, polyuria, polyphagia. Denies hematuria, pyuria, dysuria, hesitancy, frequency or urgency. Denies consumption of raw and under cooked meats foods or change in water source.  Denies fever, chills, sweats, night sweats.  Denies missing any routine medications. Remainder of ROS is negative.    Objective:  tMax 24 hrs: Temp (24hrs), Av.9 °F (36.6 °C), Min:97.3 °F (36.3 °C), Max:98.5 °F (36.9 °C)    Vitals Ranges:   Temp:  [97.3 °F (36.3 °C)-98.5 °F (36.9 °C)] 97.3 °F (36.3 °C)  Heart Rate:  [67-78] 69  Resp:  [16-18] 18  BP: (106-140)/(58-86) 131/58      Exam:  /58 (BP Location: Left arm, Patient Position: Lying)   Pulse 69   Temp 97.3 °F (36.3 °C) (Oral)   Resp 18   Ht 180.3 cm (71\")   Wt 74.8 kg (165 lb)   SpO2 97%   BMI 23.01 kg/m²     General Appearance:    Alert, cooperative, no distress, appears stated age   Head:    " Normocephalic, without obvious abnormality, atraumatic   Eyes:    PERRL, conjunctivae/corneas clear, EOM's intact, both eyes   Ears:    Normal external ear canals, both ears   Nose:   Nares normal, septum midline, mucosa normal, no drainage    or sinus tenderness   Throat:   Lips, mucosa, and tongue normal   Neck:   Supple, symmetrical, trachea midline, no adenopathy;     thyroid:  no enlargement/tenderness/nodules; no carotid    bruit or JVD   Back:     Symmetric, no curvature, ROM normal, no CVA tenderness   Lungs:     Clear to auscultation bilaterally, respirations unlabored   Chest Wall:    No tenderness or deformity    Heart:    Regular rate and rhythm, S1 and S2 normal, no murmur, rub   or gallop   Abdomen:     Soft, nontender, bowel sounds active all four quadrants,     no masses, no hepatomegaly, no splenomegaly   Extremities:   Extremities normal, atraumatic, no cyanosis or edema   Pulses:   2+ and symmetric all extremities   Skin:   Skin color, texture, turgor normal, no rashes or lesions   Lymph nodes:   Cervical, supraclavicular, and axillary nodes normal   Neurologic:   CNII-XII intact, normal strength, sensation intact throughout      .    Data Review:  Lab Results (last 72 hours)       Procedure Component Value Units Date/Time    Manual Differential [[id number]]  (Abnormal) Collected: 12/26/23 0744    Specimen: Blood from Arm, Left Updated: 12/26/23 0843     Neutrophil % 63.9 %      Lymphocyte % 18.6 %      Monocyte % 15.5 %      Basophil % 2.1 %      Neutrophils Absolute 3.73 10*3/mm3      Lymphocytes Absolute 1.08 10*3/mm3      Monocytes Absolute 0.90 10*3/mm3      Basophils Absolute 0.12 10*3/mm3      nRBC 2.1 /100 WBC      Hypochromia Mod/2+     Ovalocytes Slight/1+     Smudge Cells Slight/1+     Platelet Morphology Normal    Comprehensive Metabolic Panel [[id number]]  (Abnormal) Collected: 12/26/23 0744    Specimen: Blood from Arm, Left Updated: 12/26/23 0814     Glucose 85 mg/dL      BUN 17 mg/dL       Creatinine 0.93 mg/dL      Sodium 136 mmol/L      Potassium 3.9 mmol/L      Chloride 104 mmol/L      CO2 22.1 mmol/L      Calcium 8.7 mg/dL      Total Protein 6.1 g/dL      Albumin 3.7 g/dL      ALT (SGPT) 26 U/L      AST (SGOT) 54 U/L      Alkaline Phosphatase 58 U/L      Total Bilirubin 0.6 mg/dL      Globulin 2.4 gm/dL      A/G Ratio 1.5 g/dL      BUN/Creatinine Ratio 18.3     Anion Gap 9.9 mmol/L      eGFR 80.5 mL/min/1.73     Narrative:      GFR Normal >60  Chronic Kidney Disease <60  Kidney Failure <15    The GFR formula is only valid for adults with stable renal function between ages 18 and 70.    CK [[id number]]  (Abnormal) Collected: 12/26/23 0744    Specimen: Blood from Arm, Left Updated: 12/26/23 0814     Creatine Kinase 1,782 U/L     CBC & Differential [[id number]]  (Abnormal) Collected: 12/26/23 0744    Specimen: Blood from Arm, Left Updated: 12/26/23 0758    Narrative:      The following orders were created for panel order CBC & Differential.  Procedure                               Abnormality         Status                     ---------                               -----------         ------                     CBC Auto Differential[[id number]]        Abnormal            Final result                 Please view results for these tests on the individual orders.    CBC Auto Differential [[id number]]  (Abnormal) Collected: 12/26/23 0744    Specimen: Blood from Arm, Left Updated: 12/26/23 0758     WBC 5.83 10*3/mm3      RBC 4.23 10*6/mm3      Hemoglobin 11.3 g/dL      Hematocrit 35.6 %      MCV 84.2 fL      MCH 26.7 pg      MCHC 31.7 g/dL      RDW 16.8 %      RDW-SD 52.0 fl      MPV 9.6 fL      Platelets 208 10*3/mm3      nRBC 0.2 /100 WBC                      Imaging Results (All)       Procedure Component Value Units Date/Time    CT Head Without Contrast [[id number]] Collected: 12/26/23 0755     Updated: 12/26/23 0755    Narrative:      CT HEAD AND CERVICAL SPINE WITHOUT CONTRAST     HISTORY: Fall.      COMPARISON: CT head and cervical spine 11/25/2022.     CT HEAD WITHOUT CONTRAST:     The brain and ventricles are symmetrical. There is no evidence of  hemorrhage, hydrocephalus or of abnormal extra-axial fluid. No focal  area of decreased attenuation to suggest acute infarction is identified.  Bone windows showed no evidence of a calvarial fracture. Bilateral  medial maxillary osteotomies as well as bilateral partial  ethmoidectomies are noted. Mild mucosal thickening involving the ethmoid  air cells is noted bilaterally and involving the right maxillary sinus.       Impression:      There is no evidence of fracture or of intracranial  hemorrhage.     CT EXAMINATION OF THE CERVICAL SPINE WITHOUT CONTRAST:     The patient has undergone anterior cervical fusion from C3 to C7 with  anterior plate, screws and interbody graft material. Lucency related to  the screws at C7 is appreciated consistent with loosening, present  previously. There is fusion of the facets to the left at C4-5. There is  no evidence of prevertebral edema or of fracture.      C2-3: A small central disc osteophyte complex is present.     C3-4: Mild foraminal stenosis is present bilaterally, more prominent on  the left, secondary to extension of osteophyte into the neural foramen.     C4-5: There is no evidence of disc bulge or herniation.     C5-6: A mild central broad-based disc osteophyte complex is present with  no evidence of herniation. Moderate foraminal stenosis is present on the  right and there is mild-to-moderate foraminal stenosis on the left  secondary to extension of osteophyte into the neural foramen.     C6-7: A central broad-based disc osteophyte complex is present which  results in mild-to-moderate canal stenosis, slightly more prominent to  the right. It contributes to moderate foraminal stenosis bilaterally,  more prominent on the left.     C7-T1: There is no evidence of disc bulge or herniation.     IMPRESSION: There is no  evidence of fracture. Cervical fusion from C3 to  C7 is noted. The screws at C7 are loose. This was the case on  11/25/2022. Clinical correlation is suggested. Multilevel degenerative  disease is noted including multilevel foraminal stenosis secondary to  extension of osteophyte into the neural foramen most prominent on the  right at C5-6 and on the left at C6-7.     Radiation dose reduction techniques were utilized, including automated  exposure control and exposure modulation based on body size.          CT Cervical Spine Without Contrast [[id number]] Collected: 12/26/23 0755     Updated: 12/26/23 0755    Narrative:      CT HEAD AND CERVICAL SPINE WITHOUT CONTRAST     HISTORY: Fall.     COMPARISON: CT head and cervical spine 11/25/2022.     CT HEAD WITHOUT CONTRAST:     The brain and ventricles are symmetrical. There is no evidence of  hemorrhage, hydrocephalus or of abnormal extra-axial fluid. No focal  area of decreased attenuation to suggest acute infarction is identified.  Bone windows showed no evidence of a calvarial fracture. Bilateral  medial maxillary osteotomies as well as bilateral partial  ethmoidectomies are noted. Mild mucosal thickening involving the ethmoid  air cells is noted bilaterally and involving the right maxillary sinus.       Impression:      There is no evidence of fracture or of intracranial  hemorrhage.     CT EXAMINATION OF THE CERVICAL SPINE WITHOUT CONTRAST:     The patient has undergone anterior cervical fusion from C3 to C7 with  anterior plate, screws and interbody graft material. Lucency related to  the screws at C7 is appreciated consistent with loosening, present  previously. There is fusion of the facets to the left at C4-5. There is  no evidence of prevertebral edema or of fracture.      C2-3: A small central disc osteophyte complex is present.     C3-4: Mild foraminal stenosis is present bilaterally, more prominent on  the left, secondary to extension of osteophyte into the  neural foramen.     C4-5: There is no evidence of disc bulge or herniation.     C5-6: A mild central broad-based disc osteophyte complex is present with  no evidence of herniation. Moderate foraminal stenosis is present on the  right and there is mild-to-moderate foraminal stenosis on the left  secondary to extension of osteophyte into the neural foramen.     C6-7: A central broad-based disc osteophyte complex is present which  results in mild-to-moderate canal stenosis, slightly more prominent to  the right. It contributes to moderate foraminal stenosis bilaterally,  more prominent on the left.     C7-T1: There is no evidence of disc bulge or herniation.     IMPRESSION: There is no evidence of fracture. Cervical fusion from C3 to  C7 is noted. The screws at C7 are loose. This was the case on  11/25/2022. Clinical correlation is suggested. Multilevel degenerative  disease is noted including multilevel foraminal stenosis secondary to  extension of osteophyte into the neural foramen most prominent on the  right at C5-6 and on the left at C6-7.     Radiation dose reduction techniques were utilized, including automated  exposure control and exposure modulation based on body size.          XR Tibia Fibula 2 View Right [[id number]] Collected: 12/26/23 0746     Updated: 12/26/23 0752    Narrative:      XR ANKLE 3+ VW RIGHT-, XR TIBIA FIBULA 2 VW RIGHT-     INDICATION: Right lower leg injury post fall     COMPARISON: 12/26/2023       Impression:      No fracture. Ankle mortise is maintained on nonweightbearing  views.     This report was finalized on 12/26/2023 7:48 AM by Dr. Juan Harrington M.D on Workstation: [id number]       XR Ankle 3+ View Right [[id number]] Collected: 12/26/23 0746     Updated: 12/26/23 0752    Narrative:      XR ANKLE 3+ VW RIGHT-, XR TIBIA FIBULA 2 VW RIGHT-     INDICATION: Right lower leg injury post fall     COMPARISON: 12/26/2023       Impression:      No fracture. Ankle mortise is maintained on  nonweightbearing  views.     This report was finalized on 12/26/2023 7:48 AM by Dr. Juan Harrington M.D on Workstation: [id number]       XR Wrist 3+ View Right [[id number]] Collected: 12/26/23 0743     Updated: 12/26/23 0749    Narrative:      XR WRIST 3+ VW RIGHT-     INDICATION: Wrist pain post fall     COMPARISON: None available       Impression:      No fracture. Normal alignment. 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 Chondrocalcinosis most pronounced at the  triangular fibrocartilage.     This report was finalized on 12/26/2023 7:46 AM by Dr. Juan Harrington M.D on Workstation: [id number]       XR Hip With or Without Pelvis 2 - 3 View Right [[id number]] Collected: 12/26/23 0702     Updated: 12/26/23 0710    Narrative:      XR HIP W OR WO PELVIS 2-3 VIEW RIGHT-, XR FEMUR 2 VW RIGHT-     INDICATION: Fall onto right hip     COMPARISON: 1/25/2022 and 10/18/2023       Impression:      Right total hip arthroplasty. Components are well-seated  without evidence of loosening or periprosthetic fracture. No more distal  right femur fracture. Prior left superior and inferior pubic rami  fractures.      This report was finalized on 12/26/2023 7:07 AM by Dr. Juan Harrington M.D on Workstation: [id number]       XR Femur 2 View Right [[id number]] Collected: 12/26/23 0702     Updated: 12/26/23 0710    Narrative:      XR HIP W OR WO PELVIS 2-3 VIEW RIGHT-, XR FEMUR 2 VW RIGHT-     INDICATION: Fall onto right hip     COMPARISON: 1/25/2022 and 10/18/2023       Impression:      Right total hip arthroplasty. Components are well-seated  without evidence of loosening or periprosthetic fracture. No more distal  right femur fracture. Prior left superior and inferior pubic rami  fractures.      This report was finalized on 12/26/2023 7:07 AM by Dr. Juan Harrington M.D on Workstation: [id number]             Past Medical History:   Diagnosis Date    ADHD (attention deficit hyperactivity disorder)     Bronchiectasis     CHF  (congestive heart failure)     Colon polyp     COPD (chronic obstructive pulmonary disease)     Diverticulosis     Hard of hearing     On home oxygen therapy     2 LITERS    Pneumonia     Prostate cancer 04/22/2019    Spinal stenosis, lumbar region with neurogenic claudication 08/02/2022       Assessment:  Active Hospital Problems    Diagnosis  POA    **Rhabdomyolysis [M62.82]  Yes    Multiple contusions [T07.XXXA]  Unknown    Fall [W19.XXXA]  Unknown    HTN (hypertension) [I10]  Yes    Prostate cancer [C61]  Yes    Chronic diastolic CHF (congestive heart failure) [I50.32]  Yes    Dyslipidemia [E78.5]  Yes    Gastroesophageal reflux disease [K21.9]  Yes      Resolved Hospital Problems   No resolved problems to display.       Plan:  Will continue with IV fluid hydration and get follow-up lab studies.  Asked for physical therapy evaluation.    Eduardo Hanks MD  12/26/2023  13:05 EST

## 2023-12-26 NOTE — OUTREACH NOTE
Call Center TCM Note      Flowsheet Row Responses   [facility] patient discharged from? [place]   Does the patient have one of the following disease processes/diagnoses(primary or secondary)? Other   TCM attempt successful? No   Unsuccessful attempts Attempt 2   Change in Health Status Readmitted            Domenica Melendrez RN    12/26/2023, 10:07 EST

## 2023-12-26 NOTE — ED NOTES
Nursing report ED to floor  Tony Leonard  83 y.o.  male    HPI :   Chief Complaint   Patient presents with    Hip Pain    Back Pain     Right hip pain and lower back pain... fall in bathroom       Admitting doctor:   Eduardo Hanks MD    Admitting diagnosis:   The primary encounter diagnosis was Non-traumatic rhabdomyolysis. Diagnoses of Multiple contusions and Fall, initial encounter were also pertinent to this visit.    Tony Leonard is a 83 y.o. male who presents to the ED at home by EMS c/o a mechanical fall that occurred around 12 AM today.  Patient was walking into the bathroom with his walker when he slipped and fell.  He landed on his right hip.  He is unsure if he hit his head but he denies loss of consciousness.  He was unable to get off the floor and laid there for several hours.  He also complains of pain in his neck, right wrist, right thigh, and right lower leg.  Denies chest pain, shortness of breath, abdominal pain, or numbness/tingling in his extremities.  He has had a right hip dislocation.  He was recently taken off of Eliquis.       Code status:   Current Code Status       Date Active Code Status Order ID Comments User Context       Prior            Allergies:   Daliresp [roflumilast], Latex, and Sulfa antibiotics    Isolation:   No active isolations    Intake and Output  No intake or output data in the 24 hours ending 12/26/23 0955    Weight:       12/26/23  0710   Weight: 74.8 kg (165 lb)       Most recent vitals:   Vitals:    12/26/23 0746 12/26/23 0747 12/26/23 0831 12/26/23 0940   BP: 140/73  106/86 126/71   Patient Position:    Lying   Pulse:  68 78 72   Resp:    16   Temp:       SpO2:  97% 96% 98%   Weight:       Height:           Active LDAs/IV Access:   Lines, Drains & Airways       Active LDAs       Name Placement date Placement time Site Days    Peripheral IV 12/26/23 0905 Left Antecubital 12/26/23 0905  Antecubital  less than 1                    Labs (abnormal labs have a  star):   Labs Reviewed   COMPREHENSIVE METABOLIC PANEL - Abnormal; Notable for the following components:       Result Value    AST (SGOT) 54 (*)     All other components within normal limits    Narrative:     GFR Normal >60  Chronic Kidney Disease <60  Kidney Failure <15    The GFR formula is only valid for adults with stable renal function between ages 18 and 70.   CK - Abnormal; Notable for the following components:    Creatine Kinase 1,782 (*)     All other components within normal limits   CBC WITH AUTO DIFFERENTIAL - Abnormal; Notable for the following components:    Hemoglobin 11.3 (*)     Hematocrit 35.6 (*)     RDW 16.8 (*)     All other components within normal limits   MANUAL DIFFERENTIAL - Abnormal; Notable for the following components:    Lymphocyte % 18.6 (*)     Monocyte % 15.5 (*)     Basophil % 2.1 (*)     nRBC 2.1 (*)     All other components within normal limits   CBC AND DIFFERENTIAL    Narrative:     The following orders were created for panel order CBC & Differential.  Procedure                               Abnormality         Status                     ---------                               -----------         ------                     CBC Auto Differential[[id number]]        Abnormal            Final result                 Please view results for these tests on the individual orders.       EKG:   No orders to display       Meds given in ED:   Medications   sodium chloride 0.9 % bolus 1,000 mL ( Intravenous Currently Infusing 12/26/23 0938)   ondansetron (ZOFRAN) injection 4 mg (4 mg Intravenous Given 12/26/23 0936)   morphine injection 2 mg (2 mg Intravenous Given 12/26/23 0936)       Imaging results:  CT Head Without Contrast    Result Date: 12/26/2023  There is no evidence of fracture or of intracranial hemorrhage.  CT EXAMINATION OF THE CERVICAL SPINE WITHOUT CONTRAST:  The patient has undergone anterior cervical fusion from C3 to C7 with anterior plate, screws and interbody graft  material. Lucency related to the screws at C7 is appreciated consistent with loosening, present previously. There is fusion of the facets to the left at C4-5. There is no evidence of prevertebral edema or of fracture.  C2-3: A small central disc osteophyte complex is present.  C3-4: Mild foraminal stenosis is present bilaterally, more prominent on the left, secondary to extension of osteophyte into the neural foramen.  C4-5: There is no evidence of disc bulge or herniation.  C5-6: A mild central broad-based disc osteophyte complex is present with no evidence of herniation. Moderate foraminal stenosis is present on the right and there is mild-to-moderate foraminal stenosis on the left secondary to extension of osteophyte into the neural foramen.  C6-7: A central broad-based disc osteophyte complex is present which results in mild-to-moderate canal stenosis, slightly more prominent to the right. It contributes to moderate foraminal stenosis bilaterally, more prominent on the left.  C7-T1: There is no evidence of disc bulge or herniation.  IMPRESSION: There is no evidence of fracture. Cervical fusion from C3 to C7 is noted. The screws at C7 are loose. This was the case on 11/25/2022. Clinical correlation is suggested. Multilevel degenerative disease is noted including multilevel foraminal stenosis secondary to extension of osteophyte into the neural foramen most prominent on the right at C5-6 and on the left at C6-7.  Radiation dose reduction techniques were utilized, including automated exposure control and exposure modulation based on body size.       CT Cervical Spine Without Contrast    Result Date: 12/26/2023  There is no evidence of fracture or of intracranial hemorrhage.  CT EXAMINATION OF THE CERVICAL SPINE WITHOUT CONTRAST:  The patient has undergone anterior cervical fusion from C3 to C7 with anterior plate, screws and interbody graft material. Lucency related to the screws at C7 is appreciated consistent  with loosening, present previously. There is fusion of the facets to the left at C4-5. There is no evidence of prevertebral edema or of fracture.  C2-3: A small central disc osteophyte complex is present.  C3-4: Mild foraminal stenosis is present bilaterally, more prominent on the left, secondary to extension of osteophyte into the neural foramen.  C4-5: There is no evidence of disc bulge or herniation.  C5-6: A mild central broad-based disc osteophyte complex is present with no evidence of herniation. Moderate foraminal stenosis is present on the right and there is mild-to-moderate foraminal stenosis on the left secondary to extension of osteophyte into the neural foramen.  C6-7: A central broad-based disc osteophyte complex is present which results in mild-to-moderate canal stenosis, slightly more prominent to the right. It contributes to moderate foraminal stenosis bilaterally, more prominent on the left.  C7-T1: There is no evidence of disc bulge or herniation.  IMPRESSION: There is no evidence of fracture. Cervical fusion from C3 to C7 is noted. The screws at C7 are loose. This was the case on 11/25/2022. Clinical correlation is suggested. Multilevel degenerative disease is noted including multilevel foraminal stenosis secondary to extension of osteophyte into the neural foramen most prominent on the right at C5-6 and on the left at C6-7.  Radiation dose reduction techniques were utilized, including automated exposure control and exposure modulation based on body size.       XR Tibia Fibula 2 View Right    Result Date: 12/26/2023  No fracture. Ankle mortise is maintained on nonweightbearing views.  This report was finalized on 12/26/2023 7:48 AM by Dr. Juan Harrington M.D on Workstation: [id number]      XR Ankle 3+ View Right    Result Date: 12/26/2023  No fracture. Ankle mortise is maintained on nonweightbearing views.  This report was finalized on 12/26/2023 7:48 AM by Dr. Juan Harrington M.D on Workstation:  [id number]      XR Wrist 3+ View Right    Result Date: 12/26/2023  No fracture. Normal alignment. 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 Chondrocalcinosis most pronounced at the triangular fibrocartilage.  This report was finalized on 12/26/2023 7:46 AM by Dr. Juan Harrington M.D on Workstation: [id number]      XR Hip With or Without Pelvis 2 - 3 View Right    Result Date: 12/26/2023  Right total hip arthroplasty. Components are well-seated without evidence of loosening or periprosthetic fracture. No more distal right femur fracture. Prior left superior and inferior pubic rami fractures.  This report was finalized on 12/26/2023 7:07 AM by Dr. Juan Harrington M.D on Workstation: [id number]      XR Femur 2 View Right    Result Date: 12/26/2023  Right total hip arthroplasty. Components are well-seated without evidence of loosening or periprosthetic fracture. No more distal right femur fracture. Prior left superior and inferior pubic rami fractures.  This report was finalized on 12/26/2023 7:07 AM by Dr. uJan Harrington M.D on Workstation: [id number]       Ambulatory status:   - assist x2    Social issues:   Social History     Socioeconomic History    Marital status: Single   Tobacco Use    Smoking status: Never    Smokeless tobacco: Never   Vaping Use    Vaping Use: Never used   Substance and Sexual Activity    Alcohol use: No     Comment: red wine occassional    Drug use: No    Sexual activity: Defer       NIH Stroke Scale:       Chelsea So RN  12/26/23 09:55 EST

## 2023-12-26 NOTE — ED PROVIDER NOTES
EMERGENCY DEPARTMENT ENCOUNTER    Room Number:  10/10  PCP: Harpreet Kate MD  Historian: Patient, EMS      HPI:  Chief Complaint: Fall  A complete HPI/ROS/PMH/PSH/SH/FH are unobtainable due to: Nothing  Context: Tony Leonard is a 83 y.o. male who presents to the ED at home by EMS c/o a mechanical fall that occurred around 12 AM today.  Patient was walking into the bathroom with his walker when he slipped and fell.  He landed on his right hip.  He is unsure if he hit his head but he denies loss of consciousness.  He was unable to get off the floor and laid there for several hours.  He also complains of pain in his neck, right wrist, right thigh, and right lower leg.  Denies chest pain, shortness of breath, abdominal pain, or numbness/tingling in his extremities.  He has had a right hip dislocation.  He was recently taken off of Eliquis.            PAST MEDICAL HISTORY  Active Ambulatory Problems     Diagnosis Date Noted    Thrush, oral 03/11/2016    ADD (attention deficit disorder) 03/11/2016    Hemorrhoids 03/11/2016    Bronchiectasis with acute lower respiratory infection 03/11/2016    Diverticul disease small and large intestine, no perforati or abscess 03/11/2016    Benign non-nodular prostatic hyperplasia without lower urinary tract symptoms 03/11/2016    Gastroesophageal reflux disease 03/11/2016    Malaise and fatigue 03/11/2016    Environmental allergies 04/25/2016    Hemoptysis 04/27/2016    Dyslipidemia 05/11/2016    Primary osteoarthritis involving multiple joints 05/11/2016    Pneumonia of right lower lobe due to infectious organism 10/03/2016    Abnormal EKG 10/04/2016    COPD (chronic obstructive pulmonary disease) 10/04/2016    Mild malnutrition 03/28/2017    Acute on chronic respiratory failure with hypoxia 04/27/2017    Fracture, intertrochanteric, right femur, closed, initial encounter 01/16/2018    Chronic diastolic CHF (congestive heart failure) 01/16/2018    Hematoma of frontal scalp  01/16/2018    Postoperative anemia due to acute blood loss 01/19/2018    Urinary retention 01/25/2018    Hemorrhagic disorder due to circulating anticoagulants 01/29/2018    History of fracture of right hip 02/22/2018    Closed fracture of right hip with routine healing 02/28/2018    Chronic low back pain with sciatica 06/21/2018    Change in bowel function 04/18/2019    Rectal bleeding 04/18/2019    Prostate cancer 04/22/2019    On home oxygen therapy 09/24/2019    Acute viral bronchitis 12/29/2019    Peripheral neuropathy 07/01/2021    Plantar fasciitis 09/08/2021    HTN (hypertension) 05/06/2022    COPD exacerbation 05/07/2022    Bilateral lower extremity edema 05/07/2022    Microscopic hematuria 05/08/2022    Acute midline low back pain with right-sided sciatica 08/01/2022    Spinal stenosis, lumbar region with neurogenic claudication 08/02/2022    Compression deformity of vertebra 08/02/2022    Rectal bleed 09/23/2022    Esophagitis 09/24/2022    Angiectasia 09/27/2022    Hiatal hernia 09/27/2022    Overweight (BMI 25.0-29.9) 11/14/2022    Leukocytosis 11/15/2022    Bilateral hip pain 11/21/2022    Elevated troponin level not due myocardial infarction 12/10/2022    Nocturnal hypoxia 12/10/2022    Pneumonia of right upper lobe due to infectious organism 12/29/2022    NSTEMI (non-ST elevated myocardial infarction) 01/29/2023    Chronic respiratory failure with hypoxia 01/29/2023    Paroxysmal atrial fibrillation 02/17/2023    Acute exacerbation of chronic obstructive pulmonary disease (COPD) 05/10/2023    Anemia 05/10/2023    Non-compliant patient 05/11/2023    Hypokalemia 10/01/2020    Spondylolisthesis of lumbar region 08/14/2018    Elevated troponin 12/22/2023     Resolved Ambulatory Problems     Diagnosis Date Noted    Pneumonia of both lower lobes due to infectious organism 03/11/2016    Pneumonia of right upper lobe due to infectious organism 04/22/2016    COPD exacerbation 04/25/2016    GERD  (gastroesophageal reflux disease) 04/25/2016    Chronic respiratory failure with hypoxia 10/04/2016    Bacterial lobar pneumonia 12/27/2016    Pneumonia of left lower lobe due to infectious organism 03/26/2017    Bronchitis 06/01/2017    COPD exacerbation 06/17/2017    Leukocytosis 01/16/2018    Right upper lobe pneumonia 01/20/2018    Mucus plugging of bronchi 01/16/2018    COPD exacerbation 04/16/2018    Degenerative joint disease (DJD) of hip 09/23/2019    Bronchiectasis with (acute) exacerbation 12/28/2019    Septic shock 11/26/2022    Acute saddle pulmonary embolism without acute cor pulmonale  - 12/11/2022 12/11/2022     Past Medical History:   Diagnosis Date    ADHD (attention deficit hyperactivity disorder)     Bronchiectasis     CHF (congestive heart failure)     Colon polyp     Diverticulosis     Hard of hearing     Pneumonia          PAST SURGICAL HISTORY  Past Surgical History:   Procedure Laterality Date    BRONCHOSCOPY N/A 4/30/2016    Procedure: BRONCHOSCOPY with BAL of right lower lobe and left  lower lobe.  ;  Surgeon: Nando Diaz MD;  Location: [facility] ENDOSCOPY;  Service:     BRONCHOSCOPY N/A 4/28/2017    Procedure: BRONCHOSCOPY;  Surgeon: Katharina Salter MD;  Location: [facility] ENDOSCOPY;  Service:     BRONCHOSCOPY Bilateral 6/29/2017    Procedure: BRONCHOSCOPY WITH WASHINGS;  Surgeon: Katharina Salter MD;  Location: [facility] ENDOSCOPY;  Service:     BRONCHOSCOPY N/A 1/22/2018    Procedure: BRONCHOSCOPY AT BEDSIDE with BAL;  Surgeon: Katharina Salter MD;  Location: [facility] ENDOSCOPY;  Service:     BRONCHOSCOPY N/A 1/30/2018    Procedure: BRONCHOSCOPY with BAL and washing;  Surgeon: Shreyas Wild MD;  Location: [facility] ENDOSCOPY;  Service:     BRONCHOSCOPY Bilateral 4/24/2018    Procedure: BRONCHOSCOPY WITH WASHING;  Surgeon: Katharina Salter MD;  Location: [facility] ENDOSCOPY;  Service: Pulmonary    BRONCHOSCOPY N/A 12/28/2019    Procedure: BRONCHOSCOPY with bilateral washing;  Surgeon: Katharina Salter MD;   Location: [facility] ENDOSCOPY;  Service: Pulmonary    BRONCHOSCOPY Bilateral 1/4/2023    Procedure: BRONCHOSCOPY with lavage;  Surgeon: Katharina Salter MD;  Location: [facility] ENDOSCOPY;  Service: Pulmonary;  Laterality: Bilateral;  mucus plugging    CARDIAC CATHETERIZATION N/A 1/29/2023    Procedure: Left Heart Cath;  Surgeon: Johnnie Ang MD;  Location: [facility] CATH INVASIVE LOCATION;  Service: Cardiology;  Laterality: N/A;    CARDIAC CATHETERIZATION N/A 1/29/2023    Procedure: Coronary angiography;  Surgeon: Johnnie Ang MD;  Location: [facility] CATH INVASIVE LOCATION;  Service: Cardiology;  Laterality: N/A;    COLONOSCOPY  05/17/2013    eh, ih, tort, sig tics    COLONOSCOPY N/A 5/10/2019    Non-thrombosed external hemorrhoids found on perianal exam, Diverticulosis, Tortuous colon, One 5 mm polyp in the mid ascending colon, IH. Path: Tubular adenoma.     COLONOSCOPY N/A 9/24/2022    Procedure: COLONOSCOPY to cecum and TI with argon plasma coagulation.;  Surgeon: Bruce Black MD;  Location: [facility] ENDOSCOPY;  Service: Gastroenterology;  Laterality: N/A;  pre- GI bleeding  post- radiation proctitis, diverticulosis    ENDOSCOPY  03/19/2015    z line irreg, hh    ENDOSCOPY N/A 9/24/2022    Procedure: ESOPHAGOGASTRODUODENOSCOPY;  Surgeon: Bruce Black MD;  Location: [facility] ENDOSCOPY;  Service: Gastroenterology;  Laterality: N/A;  pre- GI bleeding  post- esophagitis, hiatal hernia    HIP OPEN REDUCTION Right 1/17/2018    Procedure: HIP OPEN REDUCTION INTERNAL FIXATION WITH DYNAMIC HIP SCREW;  Surgeon: Les Black MD;  Location: [facility] MAIN OR;  Service:     SPINE SURGERY      TONSILLECTOMY      TOTAL HIP ARTHROPLASTY REVISION Right 9/23/2019    Procedure: HIP  REVISION RIGHT;  Surgeon: Isauro Warner MD;  Location: [facility] MAIN OR;  Service: Orthopedics         FAMILY HISTORY  Family History   Problem Relation Age of Onset    Thyroid disease Mother     No Known Problems Father     Luciana  Hyperthermia Neg Hx          SOCIAL HISTORY  Social History     Socioeconomic History    Marital status: Single   Tobacco Use    Smoking status: Never    Smokeless tobacco: Never   Vaping Use    Vaping Use: Never used   Substance and Sexual Activity    Alcohol use: No     Comment: red wine occassional    Drug use: No    Sexual activity: Defer         ALLERGIES  Daliresp [roflumilast], Latex, and Sulfa antibiotics    REVIEW OF SYSTEMS  All systems have been reviewed and are negative except for those listed in the HPI      PHYSICAL EXAM  ED Triage Vitals [12/26/23 0413]   Temp Heart Rate Resp BP SpO2   98.5 °F (36.9 °C) 74 16 137/63 98 %      Temp src Heart Rate Source Patient Position BP Location FiO2 (%)   -- Monitor -- -- --       Physical Exam      GENERAL: Awake, alert, oriented x 3.  Well-developed, well-nourished elderly male.    HENT: NCAT, nares patent  EYES: PERRL, EOMI  CV: regular rhythm, normal rate  RESPIRATORY: normal effort, clear to auscultation bilaterally  ABDOMEN: soft, nontender  MUSCULOSKELETAL: There is tenderness of the right wrist, right hip, right thigh, right lower leg, and right ankle.  Remainder of his extremities are nontender.  There is tenderness of the C-spine.  No step-offs.  T and L-spine and chest are nontender.  Pelvis is stable.  There is full range of motion in all extremities  NEURO: Speech is normal.  No facial droop.  Follows commands.  GCS 15  PSYCH:  calm, cooperative  SKIN: warm, dry    Vital signs and nursing notes reviewed.          LAB RESULTS  Recent Results (from the past 24 hour(s))   Comprehensive Metabolic Panel    Collection Time: 12/26/23  7:44 AM    Specimen: Arm, Left; Blood   Result Value Ref Range    Glucose 85 65 - 99 mg/dL    BUN 17 8 - 23 mg/dL    Creatinine 0.93 0.76 - 1.27 mg/dL    Sodium 136 136 - 145 mmol/L    Potassium 3.9 3.5 - 5.2 mmol/L    Chloride 104 98 - 107 mmol/L    CO2 22.1 22.0 - 29.0 mmol/L    Calcium 8.7 8.6 - 10.5 mg/dL    Total Protein  6.1 6.0 - 8.5 g/dL    Albumin 3.7 3.5 - 5.2 g/dL    ALT (SGPT) 26 1 - 41 U/L    AST (SGOT) 54 (H) 1 - 40 U/L    Alkaline Phosphatase 58 39 - 117 U/L    Total Bilirubin 0.6 0.0 - 1.2 mg/dL    Globulin 2.4 gm/dL    A/G Ratio 1.5 g/dL    BUN/Creatinine Ratio 18.3 7.0 - 25.0    Anion Gap 9.9 5.0 - 15.0 mmol/L    eGFR 80.5 >60.0 mL/min/1.73   CK    Collection Time: 12/26/23  7:44 AM    Specimen: Arm, Left; Blood   Result Value Ref Range    Creatine Kinase 1,782 (H) 20 - 200 U/L   CBC Auto Differential    Collection Time: 12/26/23  7:44 AM    Specimen: Arm, Left; Blood   Result Value Ref Range    WBC 5.83 3.40 - 10.80 10*3/mm3    RBC 4.23 4.14 - 5.80 10*6/mm3    Hemoglobin 11.3 (L) 13.0 - 17.7 g/dL    Hematocrit 35.6 (L) 37.5 - 51.0 %    MCV 84.2 79.0 - 97.0 fL    MCH 26.7 26.6 - 33.0 pg    MCHC 31.7 31.5 - 35.7 g/dL    RDW 16.8 (H) 12.3 - 15.4 %    RDW-SD 52.0 37.0 - 54.0 fl    MPV 9.6 6.0 - 12.0 fL    Platelets 208 140 - 450 10*3/mm3    nRBC 0.2 0.0 - 0.2 /100 WBC   Manual Differential    Collection Time: 12/26/23  7:44 AM    Specimen: Arm, Left; Blood   Result Value Ref Range    Neutrophil % 63.9 42.7 - 76.0 %    Lymphocyte % 18.6 (L) 19.6 - 45.3 %    Monocyte % 15.5 (H) 5.0 - 12.0 %    Basophil % 2.1 (H) 0.0 - 1.5 %    Neutrophils Absolute 3.73 1.70 - 7.00 10*3/mm3    Lymphocytes Absolute 1.08 0.70 - 3.10 10*3/mm3    Monocytes Absolute 0.90 0.10 - 0.90 10*3/mm3    Basophils Absolute 0.12 0.00 - 0.20 10*3/mm3    nRBC 2.1 (H) 0.0 - 0.2 /100 WBC    Hypochromia Mod/2+ None Seen    Ovalocytes Slight/1+ None Seen    Smudge Cells Slight/1+ None Seen    Platelet Morphology Normal Normal       Ordered the above labs and reviewed the results.        RADIOLOGY  CT Head Without Contrast, CT Cervical Spine Without Contrast    Result Date: 12/26/2023  CT HEAD AND CERVICAL SPINE WITHOUT CONTRAST  HISTORY: Fall.  COMPARISON: CT head and cervical spine 11/25/2022.  CT HEAD WITHOUT CONTRAST:  The brain and ventricles are symmetrical.  There is no evidence of hemorrhage, hydrocephalus or of abnormal extra-axial fluid. No focal area of decreased attenuation to suggest acute infarction is identified. Bone windows showed no evidence of a calvarial fracture. Bilateral medial maxillary osteotomies as well as bilateral partial ethmoidectomies are noted. Mild mucosal thickening involving the ethmoid air cells is noted bilaterally and involving the right maxillary sinus.      There is no evidence of fracture or of intracranial hemorrhage.  CT EXAMINATION OF THE CERVICAL SPINE WITHOUT CONTRAST:  The patient has undergone anterior cervical fusion from C3 to C7 with anterior plate, screws and interbody graft material. Lucency related to the screws at C7 is appreciated consistent with loosening, present previously. There is fusion of the facets to the left at C4-5. There is no evidence of prevertebral edema or of fracture.  C2-3: A small central disc osteophyte complex is present.  C3-4: Mild foraminal stenosis is present bilaterally, more prominent on the left, secondary to extension of osteophyte into the neural foramen.  C4-5: There is no evidence of disc bulge or herniation.  C5-6: A mild central broad-based disc osteophyte complex is present with no evidence of herniation. Moderate foraminal stenosis is present on the right and there is mild-to-moderate foraminal stenosis on the left secondary to extension of osteophyte into the neural foramen.  C6-7: A central broad-based disc osteophyte complex is present which results in mild-to-moderate canal stenosis, slightly more prominent to the right. It contributes to moderate foraminal stenosis bilaterally, more prominent on the left.  C7-T1: There is no evidence of disc bulge or herniation.  IMPRESSION: There is no evidence of fracture. Cervical fusion from C3 to C7 is noted. The screws at C7 are loose. This was the case on 11/25/2022. Clinical correlation is suggested. Multilevel degenerative disease is  noted including multilevel foraminal stenosis secondary to extension of osteophyte into the neural foramen most prominent on the right at C5-6 and on the left at C6-7.  Radiation dose reduction techniques were utilized, including automated exposure control and exposure modulation based on body size.       XR Tibia Fibula 2 View Right, XR Ankle 3+ View Right    Result Date: 12/26/2023  XR ANKLE 3+ VW RIGHT-, XR TIBIA FIBULA 2 VW RIGHT-  INDICATION: Right lower leg injury post fall  COMPARISON: 12/26/2023      No fracture. Ankle mortise is maintained on nonweightbearing views.  This report was finalized on 12/26/2023 7:48 AM by Dr. Juan Harrington M.D on Workstation: [id number]      XR Wrist 3+ View Right    Result Date: 12/26/2023  XR WRIST 3+ VW RIGHT-  INDICATION: Wrist pain post fall  COMPARISON: None available      No fracture. Normal alignment. 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 Chondrocalcinosis most pronounced at the triangular fibrocartilage.  This report was finalized on 12/26/2023 7:46 AM by Dr. Juan Harrington M.D on Workstation: [id number]      XR Hip With or Without Pelvis 2 - 3 View Right, XR Femur 2 View Right    Result Date: 12/26/2023  XR HIP W OR WO PELVIS 2-3 VIEW RIGHT-, XR FEMUR 2 VW RIGHT-  INDICATION: Fall onto right hip  COMPARISON: 1/25/2022 and 10/18/2023      Right total hip arthroplasty. Components are well-seated without evidence of loosening or periprosthetic fracture. No more distal right femur fracture. Prior left superior and inferior pubic rami fractures.  This report was finalized on 12/26/2023 7:07 AM by Dr. Juan Harrington M.D on Workstation: [id number]       Ordered the above noted radiological studies. Reviewed by me in PACS.            PROCEDURES  Procedures              MEDICATIONS GIVEN IN ER  Medications   sodium chloride 0.9 % bolus 1,000 mL ( Intravenous Currently Infusing 12/26/23 1023)   ondansetron (ZOFRAN) injection 4 mg (4 mg Intravenous Given 12/26/23  0936)   morphine injection 2 mg (2 mg Intravenous Given 12/26/23 0936)                   MEDICAL DECISION MAKING, PROGRESS, and CONSULTS    All labs have been independently reviewed by me.  All radiology studies have been reviewed by me and I have also reviewed the radiology report.   EKG's independently viewed and interpreted by me.  Discussion below represents my analysis of pertinent findings related to patient's condition, differential diagnosis, treatment plan and final disposition.      Additional sources:  - Discussed/ obtained information from independent historians: EMS    - External (non-ED) record review: Patient was admitted here 12/21 through 12/22/2023 for sore throat and elevated troponin.  He was seen by cardiology.  Stress test was performed and findings were consistent with a low risk study.  He was taken off of Eliquis.    - Chronic or social conditions impacting care: Patient lives alone          Orders placed during this visit:  Orders Placed This Encounter   Procedures    XR Hip With or Without Pelvis 2 - 3 View Right    CT Head Without Contrast    CT Cervical Spine Without Contrast    XR Femur 2 View Right    XR Wrist 3+ View Right    XR Tibia Fibula 2 View Right    XR Ankle 3+ View Right    Comprehensive Metabolic Panel    CK    CBC Auto Differential    Manual Differential    LHA (on-call MD unless specified) Details    Initiate Observation Status    CBC & Differential         Additional orders considered but not ordered:  None        Differential diagnosis:    Extremity fracture/contusion/dislocation, skull fracture, closed head injury, intracranial hemorrhage      Independent interpretation of labs, radiology studies, and discussions with consultants:  ED Course as of 12/26/23 1037   Tue Dec 26, 2023   0736 Right hip x-rays personally interpreted by me.  My personal interpretation is: There is a right hip arthroplasty.  No hip or pelvic fracture.  No dislocation [WH]   0833 Creatine  Kinase(!): 1,782 [WH]   0833 WBC: 5.83 [WH]   0833 Hemoglobin(!): 11.3 [WH]   0833 BUN: 17 [WH]   0833 Creatinine: 0.93 [WH]   0833 Per the radiologist, head CT and CT C-spine are negative acute. [WH]   0837 Per the radiologist, x-rays of the right hip, right femur, right tib-fib, right ankle, and right wrist are negative acute []   0916 Discussed with Dr. Hanks and he agrees to admit the patient.  Pertinent history, exam findings, test results, ED course, and diagnoses were discussed with him. [WH]   0930 MDM: Patient presented the ED after mechanical fall last night.  He laid on the floor for several hours before he was able to call EMS.  His imaging studies were negative acute.  CK was elevated but renal function was normal.  Patient was given IV fluids and IV morphine.  He will be admitted for rhabdomyolysis. []      ED Course User Index  [] Davian Haider MD               DIAGNOSIS  Final diagnoses:   Non-traumatic rhabdomyolysis   Multiple contusions   Fall, initial encounter         DISPOSITION  ADMISSION    Discussed treatment plan and reason for admission with pt/family and admitting physician.  Pt/family voiced understanding of the plan for admission for further testing/treatment as needed.               Latest Documented Vital Signs:  As of 10:37 EST  BP- 125/68 HR- 67 Temp- 98.5 °F (36.9 °C) O2 sat- 97%              --    Please note that portions of this were completed with a voice recognition program.       Note Disclaimer: At [facility], we believe that sharing information builds trust and better relationships. You are receiving this note because you are receiving care at [facility] or recently visited. It is possible you will see health information before a provider has talked with you about it. This kind of information can be easy to misunderstand. To help you fully understand what it means for your health, we urge you to discuss this note with your provider.             Vitaly  Davian PHMA MD  12/26/23 1037

## 2023-12-26 NOTE — ED NOTES
Lives at home and was walking to bathroom using rolling walker and fell in the bathroom ... C/o Right hip pain from hip to knee.  Denies LOC, Head strike or blood thinners

## 2023-12-27 ENCOUNTER — APPOINTMENT (OUTPATIENT)
Dept: GENERAL RADIOLOGY | Facility: HOSPITAL | Age: 83
DRG: 558 | End: 2023-12-27
Payer: MEDICARE

## 2023-12-27 LAB
ANION GAP SERPL CALCULATED.3IONS-SCNC: 9 MMOL/L (ref 5–15)
BASOPHILS # BLD MANUAL: 0.09 10*3/MM3 (ref 0–0.2)
BASOPHILS NFR BLD MANUAL: 2 % (ref 0–1.5)
BUN SERPL-MCNC: 13 MG/DL (ref 8–23)
BUN/CREAT SERPL: 14.6 (ref 7–25)
BURR CELLS BLD QL SMEAR: ABNORMAL
C3 FRG RBC-MCNC: ABNORMAL
CALCIUM SPEC-SCNC: 7.8 MG/DL (ref 8.6–10.5)
CHLORIDE SERPL-SCNC: 108 MMOL/L (ref 98–107)
CK SERPL-CCNC: 1018 U/L (ref 20–200)
CO2 SERPL-SCNC: 21 MMOL/L (ref 22–29)
CREAT SERPL-MCNC: 0.89 MG/DL (ref 0.76–1.27)
DEPRECATED RDW RBC AUTO: 50.3 FL (ref 37–54)
EGFRCR SERPLBLD CKD-EPI 2021: 84 ML/MIN/1.73
ERYTHROCYTE [DISTWIDTH] IN BLOOD BY AUTOMATED COUNT: 16.5 % (ref 12.3–15.4)
GLUCOSE SERPL-MCNC: 90 MG/DL (ref 65–99)
HCT VFR BLD AUTO: 34 % (ref 37.5–51)
HGB BLD-MCNC: 10.9 G/DL (ref 13–17.7)
LYMPHOCYTES # BLD MANUAL: 0.81 10*3/MM3 (ref 0.7–3.1)
LYMPHOCYTES NFR BLD MANUAL: 15.2 % (ref 5–12)
MCH RBC QN AUTO: 26.8 PG (ref 26.6–33)
MCHC RBC AUTO-ENTMCNC: 32.1 G/DL (ref 31.5–35.7)
MCV RBC AUTO: 83.5 FL (ref 79–97)
MONOCYTES # BLD: 0.67 10*3/MM3 (ref 0.1–0.9)
NEUTROPHILS # BLD AUTO: 2.87 10*3/MM3 (ref 1.7–7)
NEUTROPHILS NFR BLD MANUAL: 64.6 % (ref 42.7–76)
OVALOCYTES BLD QL SMEAR: ABNORMAL
PLAT MORPH BLD: NORMAL
PLATELET # BLD AUTO: 189 10*3/MM3 (ref 140–450)
PMV BLD AUTO: 9.7 FL (ref 6–12)
POIKILOCYTOSIS BLD QL SMEAR: ABNORMAL
POTASSIUM SERPL-SCNC: 3.9 MMOL/L (ref 3.5–5.2)
RBC # BLD AUTO: 4.07 10*6/MM3 (ref 4.14–5.8)
SMUDGE CELLS BLD QL SMEAR: ABNORMAL
SODIUM SERPL-SCNC: 138 MMOL/L (ref 136–145)
VARIANT LYMPHS NFR BLD MANUAL: 18.2 % (ref 19.6–45.3)
WBC NRBC COR # BLD AUTO: 4.44 10*3/MM3 (ref 3.4–10.8)

## 2023-12-27 PROCEDURE — 73020 X-RAY EXAM OF SHOULDER: CPT

## 2023-12-27 PROCEDURE — 85025 COMPLETE CBC W/AUTO DIFF WBC: CPT | Performed by: HOSPITALIST

## 2023-12-27 PROCEDURE — 97166 OT EVAL MOD COMPLEX 45 MIN: CPT

## 2023-12-27 PROCEDURE — 85007 BL SMEAR W/DIFF WBC COUNT: CPT | Performed by: HOSPITALIST

## 2023-12-27 PROCEDURE — 97530 THERAPEUTIC ACTIVITIES: CPT

## 2023-12-27 PROCEDURE — 97162 PT EVAL MOD COMPLEX 30 MIN: CPT

## 2023-12-27 PROCEDURE — 97535 SELF CARE MNGMENT TRAINING: CPT

## 2023-12-27 PROCEDURE — 80048 BASIC METABOLIC PNL TOTAL CA: CPT | Performed by: HOSPITALIST

## 2023-12-27 PROCEDURE — 82550 ASSAY OF CK (CPK): CPT | Performed by: HOSPITALIST

## 2023-12-27 RX ORDER — GUAIFENESIN 600 MG/1
600 TABLET, EXTENDED RELEASE ORAL EVERY 12 HOURS SCHEDULED
Status: DISCONTINUED | OUTPATIENT
Start: 2023-12-27 | End: 2023-12-30 | Stop reason: HOSPADM

## 2023-12-27 RX ADMIN — PANTOPRAZOLE SODIUM 40 MG: 40 TABLET, DELAYED RELEASE ORAL at 08:16

## 2023-12-27 RX ADMIN — GUAIFENESIN 600 MG: 600 TABLET, EXTENDED RELEASE ORAL at 20:13

## 2023-12-27 RX ADMIN — SENNOSIDES AND DOCUSATE SODIUM 2 TABLET: 50; 8.6 TABLET ORAL at 08:16

## 2023-12-27 RX ADMIN — GABAPENTIN 300 MG: 300 CAPSULE ORAL at 08:16

## 2023-12-27 RX ADMIN — ACETAMINOPHEN 650 MG: 325 TABLET, FILM COATED ORAL at 15:16

## 2023-12-27 RX ADMIN — FLUOXETINE HYDROCHLORIDE 20 MG: 20 CAPSULE ORAL at 08:16

## 2023-12-27 RX ADMIN — ACETAMINOPHEN 650 MG: 325 TABLET, FILM COATED ORAL at 20:13

## 2023-12-27 RX ADMIN — Medication 10 ML: at 08:16

## 2023-12-27 RX ADMIN — GABAPENTIN 300 MG: 300 CAPSULE ORAL at 20:13

## 2023-12-27 RX ADMIN — Medication 10 ML: at 20:43

## 2023-12-27 RX ADMIN — GABAPENTIN 300 MG: 300 CAPSULE ORAL at 15:16

## 2023-12-27 NOTE — PLAN OF CARE
Goal Outcome Evaluation:  Plan of Care Reviewed With: patient        Progress: improving  Outcome Evaluation: Pt AO*4, vss, no complaints of shortness of breath, pt rates pain 6/10 medication offered but declined, good PO intake, on IVF, standby assist w/ walker, cooperative with cares

## 2023-12-27 NOTE — PROGRESS NOTES
"DAILY PROGRESS NOTE  [facility]    Patient Identification:  Name: Tony Leonard  Age: 83 y.o.  Sex: male  :  1940  MRN: [id number]         Primary Care Physician: Harpreet Kate MD    Subjective:  Interval History: He is very weak.  He complains of right shoulder pain.    Objective:    Scheduled Meds:FLUoxetine, 20 mg, Oral, Daily  gabapentin, 300 mg, Oral, TID  guaiFENesin, 600 mg, Oral, Q12H  pantoprazole, 40 mg, Oral, QAM AC  senna-docusate sodium, 2 tablet, Oral, BID  sodium chloride, 10 mL, Intravenous, Q12H      Continuous Infusions:sodium chloride, 100 mL/hr, Last Rate: 100 mL/hr (23)        Vital signs in last 24 hours:  Temp:  [97.2 °F (36.2 °C)-99.9 °F (37.7 °C)] 97.6 °F (36.4 °C)  Heart Rate:  [70-88] 88  Resp:  [16-18] 16  BP: (105-131)/(49-71) 105/57    Intake/Output:    Intake/Output Summary (Last 24 hours) at 2023  Last data filed at 2023 1500  Gross per 24 hour   Intake 660 ml   Output 1050 ml   Net -390 ml       Exam:  /57 (BP Location: Right arm, Patient Position: Sitting)   Pulse 88   Temp 97.6 °F (36.4 °C) (Oral)   Resp 16   Ht 180.3 cm (71\")   Wt 74.8 kg (165 lb)   SpO2 94%   BMI 23.01 kg/m²     General Appearance:    Alert, cooperative, no distress   Head:    Normocephalic, without obvious abnormality, atraumatic   Eyes:       Throat:   Lips, tongue, gums normal   Neck:   Supple, symmetrical, trachea midline, no JVD   Lungs:     Clear to auscultation bilaterally, respirations unlabored   Chest Wall:    No tenderness or deformity    Heart:    Regular rate and rhythm, S1 and S2 normal, no murmur,no  Rub or gallop   Abdomen:     Soft, nontender, bowel sounds active, no masses, no organomegaly    Extremities:   Extremities normal, atraumatic, no cyanosis or edema   Pulses:      Skin:   Skin is warm and dry,  no rashes or palpable lesions   Neurologic:   no focal deficits noted      Lab Results (last 72 hours)       Procedure " Component Value Units Date/Time    Manual Differential [[id number]]  (Abnormal) Collected: 12/27/23 0620    Specimen: Blood Updated: 12/27/23 0736     Neutrophil % 64.6 %      Lymphocyte % 18.2 %      Monocyte % 15.2 %      Basophil % 2.0 %      Neutrophils Absolute 2.87 10*3/mm3      Lymphocytes Absolute 0.81 10*3/mm3      Monocytes Absolute 0.67 10*3/mm3      Basophils Absolute 0.09 10*3/mm3      [place] Cells Slight/1+     Ovalocytes Slight/1+     Poikilocytes Mod/2+     RBC Fragments Slight/1+     Smudge Cells Mod/2+     Platelet Morphology Normal    CBC Auto Differential [[id number]]  (Abnormal) Collected: 12/27/23 0620    Specimen: Blood Updated: 12/27/23 0736     WBC 4.44 10*3/mm3      RBC 4.07 10*6/mm3      Hemoglobin 10.9 g/dL      Hematocrit 34.0 %      MCV 83.5 fL      MCH 26.8 pg      MCHC 32.1 g/dL      RDW 16.5 %      RDW-SD 50.3 fl      MPV 9.7 fL      Platelets 189 10*3/mm3     Basic Metabolic Panel [[id number]]  (Abnormal) Collected: 12/27/23 0620    Specimen: Blood Updated: 12/27/23 0702     Glucose 90 mg/dL      BUN 13 mg/dL      Creatinine 0.89 mg/dL      Sodium 138 mmol/L      Potassium 3.9 mmol/L      Chloride 108 mmol/L      CO2 21.0 mmol/L      Calcium 7.8 mg/dL      BUN/Creatinine Ratio 14.6     Anion Gap 9.0 mmol/L      eGFR 84.0 mL/min/1.73     Narrative:      GFR Normal >60  Chronic Kidney Disease <60  Kidney Failure <15    The GFR formula is only valid for adults with stable renal function between ages 18 and 70.    CK [[id number]]  (Abnormal) Collected: 12/27/23 0620    Specimen: Blood Updated: 12/27/23 0702     Creatine Kinase 1,018 U/L     Manual Differential [[id number]]  (Abnormal) Collected: 12/26/23 0744    Specimen: Blood from Arm, Left Updated: 12/26/23 0843     Neutrophil % 63.9 %      Lymphocyte % 18.6 %      Monocyte % 15.5 %      Basophil % 2.1 %      Neutrophils Absolute 3.73 10*3/mm3      Lymphocytes Absolute 1.08 10*3/mm3      Monocytes Absolute 0.90 10*3/mm3      Basophils  Absolute 0.12 10*3/mm3      nRBC 2.1 /100 WBC      Hypochromia Mod/2+     Ovalocytes Slight/1+     Smudge Cells Slight/1+     Platelet Morphology Normal    Comprehensive Metabolic Panel [[id number]]  (Abnormal) Collected: 12/26/23 0744    Specimen: Blood from Arm, Left Updated: 12/26/23 0814     Glucose 85 mg/dL      BUN 17 mg/dL      Creatinine 0.93 mg/dL      Sodium 136 mmol/L      Potassium 3.9 mmol/L      Chloride 104 mmol/L      CO2 22.1 mmol/L      Calcium 8.7 mg/dL      Total Protein 6.1 g/dL      Albumin 3.7 g/dL      ALT (SGPT) 26 U/L      AST (SGOT) 54 U/L      Alkaline Phosphatase 58 U/L      Total Bilirubin 0.6 mg/dL      Globulin 2.4 gm/dL      A/G Ratio 1.5 g/dL      BUN/Creatinine Ratio 18.3     Anion Gap 9.9 mmol/L      eGFR 80.5 mL/min/1.73     Narrative:      GFR Normal >60  Chronic Kidney Disease <60  Kidney Failure <15    The GFR formula is only valid for adults with stable renal function between ages 18 and 70.    CK [[id number]]  (Abnormal) Collected: 12/26/23 0744    Specimen: Blood from Arm, Left Updated: 12/26/23 0814     Creatine Kinase 1,782 U/L     CBC & Differential [[id number]]  (Abnormal) Collected: 12/26/23 0744    Specimen: Blood from Arm, Left Updated: 12/26/23 0758    Narrative:      The following orders were created for panel order CBC & Differential.  Procedure                               Abnormality         Status                     ---------                               -----------         ------                     CBC Auto Differential[[id number]]        Abnormal            Final result                 Please view results for these tests on the individual orders.    CBC Auto Differential [[id number]]  (Abnormal) Collected: 12/26/23 0744    Specimen: Blood from Arm, Left Updated: 12/26/23 0758     WBC 5.83 10*3/mm3      RBC 4.23 10*6/mm3      Hemoglobin 11.3 g/dL      Hematocrit 35.6 %      MCV 84.2 fL      MCH 26.7 pg      MCHC 31.7 g/dL      RDW 16.8 %      RDW-SD 52.0 fl       MPV 9.6 fL      Platelets 208 10*3/mm3      nRBC 0.2 /100 WBC           Data Review:  Results from last 7 days   Lab Units 12/27/23  0620 12/26/23  0744 12/22/23  0737   SODIUM mmol/L 138 136 140   POTASSIUM mmol/L 3.9 3.9 3.6   CHLORIDE mmol/L 108* 104 107   CO2 mmol/L 21.0* 22.1 21.8*   BUN mg/dL 13 17 20   CREATININE mg/dL 0.89 0.93 0.95   GLUCOSE mg/dL 90 85 85   CALCIUM mg/dL 7.8* 8.7 9.0     Results from last 7 days   Lab Units 12/27/23  0620 12/26/23  0744 12/22/23  0737   WBC 10*3/mm3 4.44 5.83 6.53   HEMOGLOBIN g/dL 10.9* 11.3* 10.6*   HEMATOCRIT % 34.0* 35.6* 34.0*   PLATELETS 10*3/mm3 189 208 240             Lab Results   Lab Value Date/Time    TROPONINT 59 (C) 12/22/2023 0737    TROPONINT 81 (C) 12/22/2023 0134    TROPONINT 65 (C) 12/21/2023 2339    TROPONINT 39 (H) 07/12/2023 0209    TROPONINT 36 (H) 07/11/2023 2110    TROPONINT 26 (H) 05/10/2023 0503    TROPONINT 32 (H) 05/10/2023 0218    TROPONINT 0.131 (C) 01/30/2023 0415    TROPONINT 0.418 (C) 01/29/2023 0538    TROPONINT 0.393 (C) 01/29/2023 0344    TROPONINT 0.167 (C) 01/29/2023 0133    TROPONINT 0.012 12/29/2022 1204    TROPONINT 0.089 (C) 12/10/2022 1003    TROPONINT 0.158 (C) 12/10/2022 0206    TROPONINT 0.079 (C) 12/09/2022 2343    TROPONINT 0.024 11/26/2022 0211    TROPONINT <0.010 11/13/2022 1437    TROPONINT <0.010 05/07/2022 2042    TROPONINT <0.010 04/26/2022 1423    TROPONINT <0.010 04/16/2018 2024    TROPONINT <0.010 11/23/2017 1843    TROPONINT <0.010 10/17/2017 2045    TROPONINT <0.010 06/28/2017 2114    TROPONINT <0.010 06/17/2017 1624    TROPONINT <0.010 05/31/2017 2026    TROPONINT <0.010 04/27/2017 2140    TROPONINT <0.010 03/26/2017 1335    TROPONINT <0.010 12/27/2016 1749    TROPONINT <0.010 12/21/2016 1309    TROPONINT <0.010 11/18/2016 1601    TROPONINT <0.010 10/04/2016 0606    TROPONINT <0.010 10/03/2016 1009         Results from last 7 days   Lab Units 12/26/23  0744 12/21/23  2339   ALK PHOS U/L 58 60   BILIRUBIN mg/dL  "0.6 0.2   ALT (SGPT) U/L 26 15   AST (SGOT) U/L 54* 19             No results found for: \"POCGLU\"        Past Medical History:   Diagnosis Date    ADHD (attention deficit hyperactivity disorder)     Bronchiectasis     CHF (congestive heart failure)     Colon polyp     COPD (chronic obstructive pulmonary disease)     Diverticulosis     Hard of hearing     On home oxygen therapy     2 LITERS    Pneumonia     Prostate cancer 04/22/2019    Spinal stenosis, lumbar region with neurogenic claudication 08/02/2022       Assessment:  Active Hospital Problems    Diagnosis  POA    **Rhabdomyolysis [M62.82]  Yes    Multiple contusions [T07.XXXA]  Unknown    Fall [W19.XXXA]  Unknown    HTN (hypertension) [I10]  Yes    Prostate cancer [C61]  Yes    Chronic diastolic CHF (congestive heart failure) [I50.32]  Yes    Dyslipidemia [E78.5]  Yes    Gastroesophageal reflux disease [K21.9]  Yes      Resolved Hospital Problems   No resolved problems to display.       Plan:  Continue with IV fluid hydration and follow-up labs.  DC planning.  He is very weak likely will need skilled nursing unit for rehab.    Eduardo Hanks MD  12/27/2023  17:34 EST   "

## 2023-12-27 NOTE — PLAN OF CARE
Goal Outcome Evaluation:  Plan of Care Reviewed With: patient           Outcome Evaluation: Pt seen for OT hiren this AM. Admitted with rhabdomyolysis after fall ambulating to the bathroom with his rollator at home. He reports he lives alone and uses a rollator for mobility. Pt with c/o of R hip, knee and wrist pain. No fxs noted on imaging. However pt reported increased R shoulder pain and demo very limited shoulder flexion due to increased pain limiting his participation with ADLs (nsg notified). Required max A for posterior hygiene and dep A for LBD.He required min A to the bathroom with rollator. Pt in increased pain and very forward flexed posture on rollator. Unable to stand completely upright and pt held to front of rollator despite cues to correct. Pt seated UIC at the end with al needs in reach. Pt demo decreased endurance, activity tolerance, functional mobility, ADLs, balance, ROM and increased pain. Pt to benefit from skilled OT to address deficits. Rec dc to SNF at this time.      Anticipated Discharge Disposition (OT): skilled nursing facility   Gretchen

## 2023-12-27 NOTE — THERAPY EVALUATION
Patient Name: Tony Leonard  : 1940    MRN: [id number]                              Today's Date: 2023       Admit Date: 2023    Visit Dx:     ICD-10-CM ICD-9-CM   1. Non-traumatic rhabdomyolysis  M62.82 728.88   2. Multiple contusions  T07.XXXA 924.8   3. Fall, initial encounter  W19.XXXA E888.9   4. Primary hypertension  I10 401.9   5. Hospital discharge follow-up  Z09 V67.59   6. Chronic diastolic congestive heart failure  I50.32 428.32     428.0   7. Saddle embolus of pulmonary artery, unspecified chronicity, unspecified whether acute cor pulmonale present  I26.92 415.13   8. Obesity with alveolar hypoventilation and serious comorbidity, unspecified classification  E66.2 278.03   9. Abdominal aortic aneurysm (AAA) without rupture, unspecified part  I71.40 441.4     Patient Active Problem List   Diagnosis    Thrush, oral    ADD (attention deficit disorder)    Hemorrhoids    Bronchiectasis with acute lower respiratory infection    Diverticul disease small and large intestine, no perforati or abscess    Benign non-nodular prostatic hyperplasia without lower urinary tract symptoms    Gastroesophageal reflux disease    Malaise and fatigue    Environmental allergies    Hemoptysis    Dyslipidemia    Primary osteoarthritis involving multiple joints    Pneumonia of right lower lobe due to infectious organism    Abnormal EKG    COPD (chronic obstructive pulmonary disease)    Mild malnutrition    Acute on chronic respiratory failure with hypoxia    Fracture, intertrochanteric, right femur, closed, initial encounter    Chronic diastolic CHF (congestive heart failure)    Hematoma of frontal scalp    Postoperative anemia due to acute blood loss    Urinary retention    Hemorrhagic disorder due to circulating anticoagulants    History of fracture of right hip    Closed fracture of right hip with routine healing    Chronic low back pain with sciatica    Change in bowel function    Rectal bleeding     Prostate cancer    On home oxygen therapy    Acute viral bronchitis    Peripheral neuropathy    Plantar fasciitis    HTN (hypertension)    COPD exacerbation    Bilateral lower extremity edema    Microscopic hematuria    Acute midline low back pain with right-sided sciatica    Spinal stenosis, lumbar region with neurogenic claudication    Compression deformity of vertebra    Rectal bleed    Esophagitis    Angiectasia    Hiatal hernia    Overweight (BMI 25.0-29.9)    Leukocytosis    Bilateral hip pain    Elevated troponin level not due myocardial infarction    Nocturnal hypoxia    Pneumonia of right upper lobe due to infectious organism    NSTEMI (non-ST elevated myocardial infarction)    Chronic respiratory failure with hypoxia    Paroxysmal atrial fibrillation    Acute exacerbation of chronic obstructive pulmonary disease (COPD)    Anemia    Non-compliant patient    Hypokalemia    Spondylolisthesis of lumbar region    Elevated troponin    Rhabdomyolysis    Multiple contusions    Fall     Past Medical History:   Diagnosis Date    ADHD (attention deficit hyperactivity disorder)     Bronchiectasis     CHF (congestive heart failure)     Colon polyp     COPD (chronic obstructive pulmonary disease)     Diverticulosis     Hard of hearing     On home oxygen therapy     2 LITERS    Pneumonia     Prostate cancer 04/22/2019    Spinal stenosis, lumbar region with neurogenic claudication 08/02/2022     Past Surgical History:   Procedure Laterality Date    BRONCHOSCOPY N/A 4/30/2016    Procedure: BRONCHOSCOPY with BAL of right lower lobe and left  lower lobe.  ;  Surgeon: Nando Diaz MD;  Location: [facility] ENDOSCOPY;  Service:     BRONCHOSCOPY N/A 4/28/2017    Procedure: BRONCHOSCOPY;  Surgeon: Katharina Salter MD;  Location: [facility] ENDOSCOPY;  Service:     BRONCHOSCOPY Bilateral 6/29/2017    Procedure: BRONCHOSCOPY WITH WASHINGS;  Surgeon: Katharina Salter MD;  Location: [facility] ENDOSCOPY;  Service:     BRONCHOSCOPY N/A  1/22/2018    Procedure: BRONCHOSCOPY AT BEDSIDE with BAL;  Surgeon: Katharina Salter MD;  Location: [facility] ENDOSCOPY;  Service:     BRONCHOSCOPY N/A 1/30/2018    Procedure: BRONCHOSCOPY with BAL and washing;  Surgeon: Shreyas Wild MD;  Location: [facility] ENDOSCOPY;  Service:     BRONCHOSCOPY Bilateral 4/24/2018    Procedure: BRONCHOSCOPY WITH WASHING;  Surgeon: Katharina Salter MD;  Location: [facility] ENDOSCOPY;  Service: Pulmonary    BRONCHOSCOPY N/A 12/28/2019    Procedure: BRONCHOSCOPY with bilateral washing;  Surgeon: Katharina Salter MD;  Location: [facility] ENDOSCOPY;  Service: Pulmonary    BRONCHOSCOPY Bilateral 1/4/2023    Procedure: BRONCHOSCOPY with lavage;  Surgeon: Katharina Salter MD;  Location: [facility] ENDOSCOPY;  Service: Pulmonary;  Laterality: Bilateral;  mucus plugging    CARDIAC CATHETERIZATION N/A 1/29/2023    Procedure: Left Heart Cath;  Surgeon: Johnnie Ang MD;  Location: [facility] CATH INVASIVE LOCATION;  Service: Cardiology;  Laterality: N/A;    CARDIAC CATHETERIZATION N/A 1/29/2023    Procedure: Coronary angiography;  Surgeon: Johnnie Ang MD;  Location: [facility] CATH INVASIVE LOCATION;  Service: Cardiology;  Laterality: N/A;    COLONOSCOPY  05/17/2013    eh, ih, tort, sig tics    COLONOSCOPY N/A 5/10/2019    Non-thrombosed external hemorrhoids found on perianal exam, Diverticulosis, Tortuous colon, One 5 mm polyp in the mid ascending colon, IH. Path: Tubular adenoma.     COLONOSCOPY N/A 9/24/2022    Procedure: COLONOSCOPY to cecum and TI with argon plasma coagulation.;  Surgeon: Bruce Black MD;  Location: [facility] ENDOSCOPY;  Service: Gastroenterology;  Laterality: N/A;  pre- GI bleeding  post- radiation proctitis, diverticulosis    ENDOSCOPY  03/19/2015    z line irreg, hh    ENDOSCOPY N/A 9/24/2022    Procedure: ESOPHAGOGASTRODUODENOSCOPY;  Surgeon: Bruce Black MD;  Location: [facility] ENDOSCOPY;  Service: Gastroenterology;  Laterality: N/A;  pre- GI bleeding  post- esophagitis,  hiatal hernia    HIP OPEN REDUCTION Right 1/17/2018    Procedure: HIP OPEN REDUCTION INTERNAL FIXATION WITH DYNAMIC HIP SCREW;  Surgeon: Les Black MD;  Location: [facility] MAIN OR;  Service:     SPINE SURGERY      TONSILLECTOMY      TOTAL HIP ARTHROPLASTY REVISION Right 9/23/2019    Procedure: HIP  REVISION RIGHT;  Surgeon: Isauro Warner MD;  Location: [facility] MAIN OR;  Service: Orthopedics      General Information       Row Name 12/27/23 1034          Physical Therapy Time and Intention    Document Type evaluation  -CS     Mode of Treatment individual therapy;physical therapy  -CS       Row Name 12/27/23 1034          General Information    Patient Profile Reviewed yes  -CS     Prior Level of Function independent:;gait;transfer;bed mobility  rollator  -CS     Existing Precautions/Restrictions fall  -CS     Barriers to Rehab none identified  -CS       Row Name 12/27/23 1034          Living Environment    People in Home alone;facility resident  ILF  -CS       Row Name 12/27/23 1034          Home Main Entrance    Number of Stairs, Main Entrance none;other (see comments)  elevator  -CS       Row Name 12/27/23 1034          Stairs Within Home, Primary    Number of Stairs, Within Home, Primary none  -CS       Row Name 12/27/23 1034          Cognition    Orientation Status (Cognition) oriented x 3  -CS       Row Name 12/27/23 1034          Safety Issues, Functional Mobility    Impairments Affecting Function (Mobility) endurance/activity tolerance;balance;strength;range of motion (ROM);pain  -CS               User Key  (r) = Recorded By, (t) = Taken By, (c) = Cosigned By      Initials Name Provider Type    CS Destiny Santiago PT Physical Therapist                   Mobility       Row Name 12/27/23 1034          Bed Mobility    Bed Mobility sit-supine  -CS     Sit-Supine [place] (Bed Mobility) moderate assist (50% patient effort);verbal cues;nonverbal cues (demo/gesture)  -CS     Comment, (Bed Mobility) increased  time to complete; required assist for B LE to return to supine  -CS       Row Name 12/27/23 1034          Sit-Stand Transfer    Sit-Stand [place] (Transfers) moderate assist (50% patient effort);verbal cues;nonverbal cues (demo/gesture)  -CS     Assistive Device (Sit-Stand Transfers) walker, front-wheeled  -CS     Comment, (Sit-Stand Transfer) cues for UE placement; limited ability to assist with R UE  -CS       Row Name 12/27/23 1034          Gait/Stairs (Locomotion)    [place] Level (Gait) minimum assist (75% patient effort);verbal cues;nonverbal cues (demo/gesture)  -CS     Assistive Device (Gait) walker, 4-wheeled  -CS     Distance in Feet (Gait) 25'  -CS     Deviations/Abnormal Patterns (Gait) norm decreased;gait speed decreased;stride length decreased;weight shifting decreased  -     Bilateral Gait Deviations forward flexed posture;heel strike decreased  -CS     [place] Level (Stairs) not tested  -CS     Comment, (Gait/Stairs) very slow pace; cues for upright posture and UE placement as pt tends to hold onto front bar of rollator - unable to correct due to pain  -               User Key  (r) = Recorded By, (t) = Taken By, (c) = Cosigned By      Initials Name Provider Type    Destiny Silverman PT Physical Therapist                   Obj/Interventions       Row Name 12/27/23 1036          Range of Motion Comprehensive    General Range of Motion bilateral lower extremity ROM WFL  -     Comment, General Range of Motion R UE limited due to pain; B LE WFL  -[facility] Name 12/27/23 1036          Strength Comprehensive (MMT)    General Manual Muscle Testing (MMT) Assessment other (see comments)  -CS     Comment, General Manual Muscle Testing (MMT) Assessment generalized weakness; R LE grossly 3/5, L LE grossly 4-/5  -       Row Name 12/27/23 1036          Balance    Balance Assessment sitting static balance;sitting dynamic balance;standing static balance;standing dynamic balance  -      Static Sitting Balance standby assist  -CS     Dynamic Sitting Balance standby assist  -CS     Position, Sitting Balance supported;sitting in chair  -CS     Static Standing Balance contact guard  -CS     Dynamic Standing Balance minimal assist  -CS     Position/Device Used, Standing Balance supported;walker, 4-wheeled  -CS     Comment, Balance no LOB noted with ambulation  -CS               User Key  (r) = Recorded By, (t) = Taken By, (c) = Cosigned By      Initials Name Provider Type    CS Destiny Santiago, PT Physical Therapist                   Goals/Plan       Row Name 12/27/23 1043          Bed Mobility Goal 1 (PT)    Activity/Assistive Device (Bed Mobility Goal 1, PT) bed mobility activities, all  -CS     [place] Level/Cues Needed (Bed Mobility Goal 1, PT) contact guard required  -CS     Time Frame (Bed Mobility Goal 1, PT) 2 weeks  -CS       Row Name 12/27/23 1043          Transfer Goal 1 (PT)    Activity/Assistive Device (Transfer Goal 1, PT) sit-to-stand/stand-to-sit;bed-to-chair/chair-to-bed  -CS     [place] Level/Cues Needed (Transfer Goal 1, PT) contact guard required  -CS     Time Frame (Transfer Goal 1, PT) 2 weeks  -CS       Row Name 12/27/23 1043          Gait Training Goal 1 (PT)    Activity/Assistive Device (Gait Training Goal 1, PT) gait (walking locomotion);assistive device use  -CS     [place] Level (Gait Training Goal 1, PT) contact guard required  -CS     Distance (Gait Training Goal 1, PT) 40'  -CS     Time Frame (Gait Training Goal 1, PT) 2 weeks  -CS               User Key  (r) = Recorded By, (t) = Taken By, (c) = Cosigned By      Initials Name Provider Type    CS Destiny Santiago, PT Physical Therapist                   Clinical Impression       Row Name 12/27/23 1037          Pain    Pretreatment Pain Rating 8/10  -CS     Posttreatment Pain Rating 8/10  -CS     Pain Location - Side/Orientation Right  -CS     Pain Location generalized  -CS     Pain Location - extremity  -CS      Pain Intervention(s) Ambulation/increased activity;Rest;Repositioned  -CS       Row Name 12/27/23 1037          Plan of Care Review    Plan of Care Reviewed With patient  -CS     Outcome Evaluation Pt is a 82 y/o M admitted to [facility] after a mechanical fall with work-up revealing multiple contusions and rhabdomylysis. Multiple x-rays done which did not show any definite fractures but did not see imaging completed for R shoulder which is concerning given the fact of pt's inability to move R UE with severe pain with limited ROM noted. Pt reports he lives at an [facility] with a elevator to enter and uses a rollator for mobility at baseline. Pt presents to PT with R sided pain from fall, generalized weakness, decreased endurance, and impaired functional mobility. Pt UIC - stood requiring mod A and ambulated in hospital room c rollator requiring min A. Pt demo's a flexed posture and slow pace. Cues for upright posture and UE placement as pt tends to grab the front bar on rollator - unable to correct due to pain. Pt requesting to return to bed requiring mod A for B LE. PT recommends SNF at D/C to address functional deficits as pt is unsafe to return to [facility] at this time.  -CS       Row Name 12/27/23 1037          Therapy Assessment/Plan (PT)    Rehab Potential (PT) good, to achieve stated therapy goals  -CS     Criteria for Skilled Interventions Met (PT) yes;meets criteria  -CS     Therapy Frequency (PT) 6 times/wk  -CS       Row Name 12/27/23 1037          Positioning and Restraints    Pre-Treatment Position sitting in chair/recliner  -CS     Post Treatment Position bed  -CS     In Bed side lying left;call light within reach;encouraged to call for assist;exit alarm on  -CS               User Key  (r) = Recorded By, (t) = Taken By, (c) = Cosigned By      Initials Name Provider Type    CS Destiny Santiago, PT Physical Therapist                   Outcome Measures       Row Name 12/27/23 1043          How much help from another  person do you currently need...    Turning from your back to your side while in flat bed without using bedrails? 3  -CS     Moving from lying on back to sitting on the side of a flat bed without bedrails? 2  -CS     Moving to and from a bed to a chair (including a wheelchair)? 3  -CS     Standing up from a chair using your arms (e.g., wheelchair, bedside chair)? 2  -CS     Climbing 3-5 steps with a railing? 2  -CS     To walk in hospital room? 3  -CS     AM-PAC 6 Clicks Score (PT) 15  -CS     Highest Level of Mobility Goal 4 --> Transfer to chair/commode  -CS       Row Name 12/27/23 1042 12/27/23 0955       Modified [place] Scale    Modified [place] Scale 4 - Moderately severe disability.  Unable to walk without assistance, and unable to attend to own bodily needs without assistance.  -KA 4 - Moderately severe disability.  Unable to walk without assistance, and unable to attend to own bodily needs without assistance.  -KA      Row Name 12/27/23 1043 12/27/23 1042       Functional Assessment    Outcome Measure Options AM-PAC 6 Clicks Basic Mobility (PT)  -CS AM-PAC 6 Clicks Daily Activity (OT);Modified [place]  -KA      Row Name 12/27/23 0955          Functional Assessment    Outcome Measure Options AM-PAC 6 Clicks Daily Activity (OT);Modified [place]  -KA               User Key  (r) = Recorded By, (t) = Taken By, (c) = Cosigned By      Initials Name Provider Type    Dejah Padilla, OT Occupational Therapist    Destiny Silverman, PT Physical Therapist                                 Physical Therapy Education       Title: PT OT SLP Therapies (In Progress)       Topic: Physical Therapy (In Progress)       Point: Mobility training (Done)       Learning Progress Summary             Patient Acceptance, E,TB, VU,DU,NR by  at 12/27/2023 1044                         Point: Home exercise program (Not Started)       Learner Progress:  Not documented in this visit.              Point: Body mechanics (Done)       Learning  Progress Summary             Patient Acceptance, E,TB, VU,DU,NR by  at 12/27/2023 1044                         Point: Precautions (Done)       Learning Progress Summary             Patient Acceptance, E,TB, VU,DU,NR by  at 12/27/2023 1044                                         User Key       Initials Effective Dates Name Provider Type Discipline     09/22/22 -  Destiny Santiago, PT Physical Therapist PT                  PT Recommendation and Plan     Plan of Care Reviewed With: patient  Outcome Evaluation: Pt is a 82 y/o M admitted to [facility] after a mechanical fall with work-up revealing multiple contusions and rhabdomylysis. Multiple x-rays done which did not show any definite fractures but did not see imaging completed for R shoulder which is concerning given the fact of pt's inability to move R UE with severe pain with limited ROM noted. Pt reports he lives at an ILF with a elevator to enter and uses a rollator for mobility at baseline. Pt presents to PT with R sided pain from fall, generalized weakness, decreased endurance, and impaired functional mobility. Pt UIC - stood requiring mod A and ambulated in hospital room c rollator requiring min A. Pt demo's a flexed posture and slow pace. Cues for upright posture and UE placement as pt tends to grab the front bar on rollator - unable to correct due to pain. Pt requesting to return to bed requiring mod A for B LE. PT recommends SNF at D/C to address functional deficits as pt is unsafe to return to ILF at this time.     Time Calculation:         PT Charges       Row Name 12/27/23 1044             Time Calculation    Start Time 1015  -CS      Stop Time 1029  -      Time Calculation (min) 14 min  -      PT Received On 12/27/23  -      PT - Next Appointment 12/28/23  -      PT Goal Re-Cert Due Date 01/10/24  -         Time Calculation- PT    Total Timed Code Minutes- PT 8 minute(s)  -         Timed Charges    76171 - PT Therapeutic Activity Minutes 8   -CS         Total Minutes    Timed Charges Total Minutes 8  -CS       Total Minutes 8  -CS                User Key  (r) = Recorded By, (t) = Taken By, (c) = Cosigned By      Initials Name Provider Type    CS Destiny Santiago, PT Physical Therapist                  Therapy Charges for Today       Code Description Service Date Service Provider Modifiers Qty    [id number]  PT THERAPEUTIC ACT EA 15 MIN 12/27/2023 Destiny Santiago, PT GP 1    [id number] HC PT EVAL MOD COMPLEXITY 2 12/27/2023 Destiny Santiago, PT GP 1            PT G-Codes  Outcome Measure Options: AM-PAC 6 Clicks Basic Mobility (PT)  AM-PAC 6 Clicks Score (PT): 15  AM-PAC 6 Clicks Score (OT): 12  Modified [place] Scale: 4 - Moderately severe disability.  Unable to walk without assistance, and unable to attend to own bodily needs without assistance.  PT Discharge Summary  Anticipated Discharge Disposition (PT): skilled nursing facility    Destiny Santiago PT  12/27/2023

## 2023-12-27 NOTE — THERAPY TREATMENT NOTE
Patient Name: Tony Leonard  : 1940    MRN: [id number]                              Today's Date: 2023       Admit Date: 2023    Visit Dx:     ICD-10-CM ICD-9-CM   1. Non-traumatic rhabdomyolysis  M62.82 728.88   2. Multiple contusions  T07.XXXA 924.8   3. Fall, initial encounter  W19.XXXA E888.9   4. Primary hypertension  I10 401.9   5. Hospital discharge follow-up  Z09 V67.59   6. Chronic diastolic congestive heart failure  I50.32 428.32     428.0   7. Saddle embolus of pulmonary artery, unspecified chronicity, unspecified whether acute cor pulmonale present  I26.92 415.13   8. Obesity with alveolar hypoventilation and serious comorbidity, unspecified classification  E66.2 278.03   9. Abdominal aortic aneurysm (AAA) without rupture, unspecified part  I71.40 441.4     Patient Active Problem List   Diagnosis    Thrush, oral    ADD (attention deficit disorder)    Hemorrhoids    Bronchiectasis with acute lower respiratory infection    Diverticul disease small and large intestine, no perforati or abscess    Benign non-nodular prostatic hyperplasia without lower urinary tract symptoms    Gastroesophageal reflux disease    Malaise and fatigue    Environmental allergies    Hemoptysis    Dyslipidemia    Primary osteoarthritis involving multiple joints    Pneumonia of right lower lobe due to infectious organism    Abnormal EKG    COPD (chronic obstructive pulmonary disease)    Mild malnutrition    Acute on chronic respiratory failure with hypoxia    Fracture, intertrochanteric, right femur, closed, initial encounter    Chronic diastolic CHF (congestive heart failure)    Hematoma of frontal scalp    Postoperative anemia due to acute blood loss    Urinary retention    Hemorrhagic disorder due to circulating anticoagulants    History of fracture of right hip    Closed fracture of right hip with routine healing    Chronic low back pain with sciatica    Change in bowel function    Rectal bleeding     Prostate cancer    On home oxygen therapy    Acute viral bronchitis    Peripheral neuropathy    Plantar fasciitis    HTN (hypertension)    COPD exacerbation    Bilateral lower extremity edema    Microscopic hematuria    Acute midline low back pain with right-sided sciatica    Spinal stenosis, lumbar region with neurogenic claudication    Compression deformity of vertebra    Rectal bleed    Esophagitis    Angiectasia    Hiatal hernia    Overweight (BMI 25.0-29.9)    Leukocytosis    Bilateral hip pain    Elevated troponin level not due myocardial infarction    Nocturnal hypoxia    Pneumonia of right upper lobe due to infectious organism    NSTEMI (non-ST elevated myocardial infarction)    Chronic respiratory failure with hypoxia    Paroxysmal atrial fibrillation    Acute exacerbation of chronic obstructive pulmonary disease (COPD)    Anemia    Non-compliant patient    Hypokalemia    Spondylolisthesis of lumbar region    Elevated troponin    Rhabdomyolysis    Multiple contusions    Fall     Past Medical History:   Diagnosis Date    ADHD (attention deficit hyperactivity disorder)     Bronchiectasis     CHF (congestive heart failure)     Colon polyp     COPD (chronic obstructive pulmonary disease)     Diverticulosis     Hard of hearing     On home oxygen therapy     2 LITERS    Pneumonia     Prostate cancer 04/22/2019    Spinal stenosis, lumbar region with neurogenic claudication 08/02/2022     Past Surgical History:   Procedure Laterality Date    BRONCHOSCOPY N/A 4/30/2016    Procedure: BRONCHOSCOPY with BAL of right lower lobe and left  lower lobe.  ;  Surgeon: Nando Diaz MD;  Location: [facility] ENDOSCOPY;  Service:     BRONCHOSCOPY N/A 4/28/2017    Procedure: BRONCHOSCOPY;  Surgeon: Katharina Salter MD;  Location: [facility] ENDOSCOPY;  Service:     BRONCHOSCOPY Bilateral 6/29/2017    Procedure: BRONCHOSCOPY WITH WASHINGS;  Surgeon: Katharina Salter MD;  Location: [facility] ENDOSCOPY;  Service:     BRONCHOSCOPY N/A  1/22/2018    Procedure: BRONCHOSCOPY AT BEDSIDE with BAL;  Surgeon: Katharina Salter MD;  Location: [facility] ENDOSCOPY;  Service:     BRONCHOSCOPY N/A 1/30/2018    Procedure: BRONCHOSCOPY with BAL and washing;  Surgeon: Shreyas Wild MD;  Location: [facility] ENDOSCOPY;  Service:     BRONCHOSCOPY Bilateral 4/24/2018    Procedure: BRONCHOSCOPY WITH WASHING;  Surgeon: Katharina Salter MD;  [facility];  Service: Pulmonary    BRONCHOSCOPY N/A 12/28/2019    Procedure: BRONCHOSCOPY with bilateral washing;  Surgeon: Katharina Salter MD;  Location: [facility] ENDOSCOPY;  Service: Pulmonary    BRONCHOSCOPY Bilateral 1/4/2023    Procedure: BRONCHOSCOPY with lavage;  Surgeon: Katharina Salter MD;  Location: [facility] ENDOSCOPY;  Service: Pulmonary;  Laterality: Bilateral;  mucus plugging    CARDIAC CATHETERIZATION N/A 1/29/2023    Procedure: Left Heart Cath;  Surgeon: Johnnie Ang MD;  Location: [facility] CATH INVASIVE LOCATION;  Service: Cardiology;  Laterality: N/A;    CARDIAC CATHETERIZATION N/A 1/29/2023    Procedure: Coronary angiography;  Surgeon: Johnnie Ang MD;  Location: [facility] CATH INVASIVE LOCATION;  Service: Cardiology;  Laterality: N/A;    COLONOSCOPY  05/17/2013    eh, ih, tort, sig tics    COLONOSCOPY N/A 5/10/2019    Non-thrombosed external hemorrhoids found on perianal exam, Diverticulosis, Tortuous colon, One 5 mm polyp in the mid ascending colon, IH. Path: Tubular adenoma.     COLONOSCOPY N/A 9/24/2022    Procedure: COLONOSCOPY to cecum and TI with argon plasma coagulation.;  Surgeon: Bruce Black MD;  Location: [facility] ENDOSCOPY;  Service: Gastroenterology;  Laterality: N/A;  pre- GI bleeding  post- radiation proctitis, diverticulosis    ENDOSCOPY  03/19/2015    z line irreg, hh    ENDOSCOPY N/A 9/24/2022    Procedure: ESOPHAGOGASTRODUODENOSCOPY;  Surgeon: Bruce Black MD;  Location: [facility] ENDOSCOPY;  Service: Gastroenterology;  Laterality: N/A;  pre- GI bleeding  post- esophagitis,  hiatal hernia    HIP OPEN REDUCTION Right 1/17/2018    Procedure: HIP OPEN REDUCTION INTERNAL FIXATION WITH DYNAMIC HIP SCREW;  Surgeon: Les Black MD;  Location: [facility] MAIN OR;  Service:     SPINE SURGERY      TONSILLECTOMY      TOTAL HIP ARTHROPLASTY REVISION Right 9/23/2019    Procedure: HIP  REVISION RIGHT;  Surgeon: Isauro Warner MD;  Location: [facility] MAIN OR;  Service: Orthopedics      General Information       Row Name 12/27/23 0826          OT Time and Intention    Document Type evaluation  -     Mode of Treatment individual therapy;occupational therapy  -       Row Name 12/27/23 0826          General Information    Patient Profile Reviewed yes  -     Prior Level of Function ADL's;all household mobility;independent:  used rollator for mobility  -     Existing Precautions/Restrictions fall  -       Row Name 12/27/23 0826          Occupational Profile    Environmental Supports and Barriers (Occupational Profile) Owns a shower chair with a back  -       Row Name 12/27/23 0826          Living Environment    People in Home alone  -       Row Name 12/27/23 0826          Home Main Entrance    Number of Stairs, Main Entrance --  3rd floor apartment with elevator access  -       Row Name 12/27/23 0826          Stairs Within Home, Primary    Number of Stairs, Within Home, Primary none  -       Row Name 12/27/23 0826          Cognition    Orientation Status (Cognition) oriented x 3  -       Row Name 12/27/23 0826          Safety Issues, Functional Mobility    Impairments Affecting Function (Mobility) endurance/activity tolerance;balance;strength;range of motion (ROM);pain  -     Comment, Safety Issues/Impairments (Mobility) nonskid socks and gait belt donned  -               User Key  (r) = Recorded By, (t) = Taken By, (c) = Cosigned By      Initials Name Provider Type    Dejah Padilla OT Occupational Therapist                     Mobility/ADL's       Row Name 12/27/23 0938           Bed Mobility    Bed Mobility supine-sit;sit-supine  -     Supine-Sit [place] (Bed Mobility) minimum assist (75% patient effort);1 person assist  pt grunting in pain throughout mobility but able to complete with min A to come to EOB  -     Comment, (Bed Mobility) increased time for bed mobility; UIC at the end of session  -       Row Name 12/27/23 0941          Transfers    Transfers sit-stand transfer  -       Row Name 12/27/23 0941          Sit-Stand Transfer    Sit-Stand [place] (Transfers) moderate assist (50% patient effort);maximum assist (25% patient effort);1 person to manage equipment  -     Assistive Device (Sit-Stand Transfers) walker, front-wheeled  -     Comment, (Sit-Stand Transfer) max A from EOB and mod A from toilet with use of grab bars on the L  -[facility] Name 12/27/23 0941          Functional Mobility    Functional Mobility- Ind. Level minimum assist (75% patient effort)  -     Functional Mobility- Device walker, front-wheeled  -     Functional Mobility- Comment To bathroom with min A. Pt in increased pain and very forward flexed posture on rollator. Unable to stand completely upright and pt held to front of rollator despite cues to correct  -       Row Name 12/27/23 0941          Activities of Daily Living    BADL Assessment/Intervention lower body dressing;toileting  -       Row Name 12/27/23 0941          Lower Body Dressing Assessment/Training    [place] Level (Lower Body Dressing) lower body dressing skills;doff;don;socks;dependent (less than 25% patient effort)  -     Position (Lower Body Dressing) supported sitting  -       Row Name 12/27/23 0941          Toileting Assessment/Training    [place] Level (Toileting) toileting skills;adjust/manage clothing;change pad/brief;perform perineal hygiene;maximum assist (25% patient effort)  -KA     Position (Toileting) supported sitting  -     Comment, (Toileting) attempted to complete with his  RUE, unable to use RUE due to pain (nsg notified). required max A to complete posterior hygiene from therapist  -               User Key  (r) = Recorded By, (t) = Taken By, (c) = Cosigned By      Initials Name Provider Type    Dejah Padilla OT Occupational Therapist                   Obj/Interventions       Row Name 12/27/23 0909          Sensory Assessment (Somatosensory)    Sensory Assessment Pt reports he had numbness in his 5th digit on R hand the day he fell but it has improved now  -ISATU       Row Name 12/27/23 0952          Range of Motion Comprehensive    General Range of Motion upper extremity range of motion deficits identified  -     Comment, General Range of Motion Very limited RUE shoulder flexion due to increased pain (nsg notified), able to flex elbow; LUE WFL  -       Row Name 12/27/23 0953          Strength Comprehensive (MMT)    General Manual Muscle Testing (MMT) Assessment upper extremity strength deficits identified  -     Comment, General Manual Muscle Testing (MMT) Assessment RUE 2+/5; LUE 3/5  -       Row Name 12/27/23 0952          Motor Skills    Motor Skills coordination  -     Coordination gross motor deficit;right;fine motor deficit;upper extremity  -       Row Name 12/27/23 0926          Balance    Balance Assessment sitting static balance;sitting dynamic balance;standing static balance;standing dynamic balance  -     Static Sitting Balance standby assist  -     Dynamic Sitting Balance contact guard  -     Position, Sitting Balance sitting edge of bed  -     Balance Interventions sitting;standing;occupation based/functional task  -               User Key  (r) = Recorded By, (t) = Taken By, (c) = Cosigned By      Initials Name Provider Type    Dejah Padilla OT Occupational Therapist                   Goals/Plan       Row Name 12/27/23 1041          Transfer Goal 1 (OT)    Activity/Assistive Device (Transfer Goal 1, OT) transfers, all  -KA      [place] Level/Cues Needed (Transfer Goal 1, OT) standby assist  -KA     Time Frame (Transfer Goal 1, OT) short term goal (STG);2 weeks  -KA     Progress/Outcome (Transfer Goal 1, OT) new goal  -       Row Name 12/27/23 1041          Bathing Goal 1 (OT)    Activity/Device (Bathing Goal 1, OT) bathing skills, all;long-handled sponge  -KA     [place] Level/Cues Needed (Bathing Goal 1, OT) standby assist  -KA     Time Frame (Bathing Goal 1, OT) short term goal (STG);2 weeks  -KA     Progress/Outcomes (Bathing Goal 1, OT) goal ongoing  -       Row Name 12/27/23 1041          Toileting Goal 1 (OT)    Activity/Device (Toileting Goal 1, OT) toileting skills, all  -KA     [place] Level/Cues Needed (Toileting Goal 1, OT) minimum assist (75% or more patient effort)  -KA     Time Frame (Toileting Goal 1, OT) short term goal (STG);2 weeks  -KA     Progress/Outcome (Toileting Goal 1, OT) goal ongoing  -       Row Name 12/27/23 1041          Grooming Goal 1 (OT)    Activity/Device (Grooming Goal 1, OT) grooming skills, all  -KA     [place] (Grooming Goal 1, OT) standby assist  -KA     Time Frame (Grooming Goal 1, OT) short term goal (STG);2 weeks  -KA     Progress/Outcome (Grooming Goal 1, OT) goal ongoing  -       Row Name 12/27/23 1041          Therapy Assessment/Plan (OT)    Planned Therapy Interventions (OT) activity tolerance training;adaptive equipment training;neuromuscular control/coordination retraining;ROM/therapeutic exercise;strengthening exercise;BADL retraining;occupation/activity based interventions;passive ROM/stretching;transfer/mobility retraining;functional balance retraining;patient/caregiver education/training  -               User Key  (r) = Recorded By, (t) = Taken By, (c) = Cosigned By      Initials Name Provider Type    Dejah Padilla, OT Occupational Therapist                   Clinical Impression       Row Name 12/27/23 5979          Pain Assessment    Pretreatment Pain  "Rating 8/10  -KA     Posttreatment Pain Rating 8/10  -KA     Pre/Posttreatment Pain Comment pt reports pain \"all over\" specifically worse in his RUE shoulder and RLE  -       Row Name 12/27/23 0954          Plan of Care Review    Plan of Care Reviewed With patient  -     Outcome Evaluation Pt seen for OT eval this AM. Admitted with rhabdomyolysis after fall ambulating to the bathroom with his rollator at home. He reports he lives alone and uses a rollator for mobility. Pt with c/o of R hip, knee and wrist pain. No fxs noted on imaging. However pt reported increased R shoulder pain and demo very limited shoulder flexion due to increased pain limiting his participation with ADLs (nsg notified). Required max A for posterior hygiene and dep A for LBD.He required min A to the bathroom with rollator. Pt in increased pain and very forward flexed posture on rollator. Unable to stand completely upright and pt held to front of rollator despite cues to correct. Pt seated UIC at the end with al needs in reach. Pt demo decreased endurance, activity tolerance, functional mobility, ADLs, balance, ROM and increased pain. Pt to benefit from skilled OT to address deficits. Rec dc to SNF at this time.  -       Row Name 12/27/23 0954          Therapy Assessment/Plan (OT)    Rehab Potential (OT) good, to achieve stated therapy goals  -     Criteria for Skilled Therapeutic Interventions Met (OT) yes;skilled treatment is necessary  -     Therapy Frequency (OT) 5 times/wk  -       Row Name 12/27/23 0954          Therapy Plan Review/Discharge Plan (OT)    Anticipated Discharge Disposition (OT) skilled nursing facility  -       Row Name 12/27/23 0954          Vital Signs    Pre Patient Position Supine  -KA     Intra Patient Position Standing  -KA     Post Patient Position Sitting  -       Row Name 12/27/23 0954          Positioning and Restraints    Pre-Treatment Position in bed  -KA     Post Treatment Position chair  -KA     " In Chair notified nsg;reclined;call light within reach;encouraged to call for assist;exit alarm on  -KA               User Key  (r) = Recorded By, (t) = Taken By, (c) = Cosigned By      Initials Name Provider Type    Dejah Padilla OT Occupational Therapist                   Outcome Measures       Row Name 12/27/23 1042 12/27/23 0955       How much help from another is currently needed...    Putting on and taking off regular lower body clothing? 1  -KA 1  -KA    Bathing (including washing, rinsing, and drying) 2  -KA 2  -KA    Toileting (which includes using toilet bed pan or urinal) 1  -KA 1  -KA    Putting on and taking off regular upper body clothing 2  -KA 2  -KA    Taking care of personal grooming (such as brushing teeth) 3  -KA 3  -KA    Eating meals 3  -KA 3  -KA    AM-PAC 6 Clicks Score (OT) 12  -KA 12  -KA      Row Name 12/27/23 1042 12/27/23 0955       Modified [place] Scale    Modified [place] Scale 4 - Moderately severe disability.  Unable to walk without assistance, and unable to attend to own bodily needs without assistance.  -KA 4 - Moderately severe disability.  Unable to walk without assistance, and unable to attend to own bodily needs without assistance.  -      Row Name 12/27/23 1042 12/27/23 0955       Functional Assessment    Outcome Measure Options AM-PAC 6 Clicks Daily Activity (OT);Modified [place]  -KA AM-PAC 6 Clicks Daily Activity (OT);Modified [place]  -KA              User Key  (r) = Recorded By, (t) = Taken By, (c) = Cosigned By      Initials Name Provider Type    Dejah Padilla OT Occupational Therapist                    Occupational Therapy Education       Title: PT OT SLP Therapies (In Progress)       Topic: Occupational Therapy (Done)       Point: ADL training (Done)       Description:   Instruct learner(s) on proper safety adaptation and remediation techniques during self care or transfers.   Instruct in proper use of assistive devices.                  Learning Progress  Summary             Patient Acceptance, E, VU,DU by ISATU at 12/27/2023 0956                         Point: Home exercise program (Done)       Description:   Instruct learner(s) on appropriate technique for monitoring, assisting and/or progressing therapeutic exercises/activities.                  Learning Progress Summary             Patient Acceptance, E, VU,DU by ISATU at 12/27/2023 0956                         Point: Precautions (Done)       Description:   Instruct learner(s) on prescribed precautions during self-care and functional transfers.                  Learning Progress Summary             Patient Acceptance, E, JURGEN,DU by ISATU at 12/27/2023 0956                                         User Key       Initials Effective Dates Name Provider Type Discipline     09/22/22 -  Dejah Rush OT Occupational Therapist OT                  OT Recommendation and Plan  Planned Therapy Interventions (OT): activity tolerance training, adaptive equipment training, neuromuscular control/coordination retraining, ROM/therapeutic exercise, strengthening exercise, BADL retraining, occupation/activity based interventions, passive ROM/stretching, transfer/mobility retraining, functional balance retraining, patient/caregiver education/training  Therapy Frequency (OT): 5 times/wk  Plan of Care Review  Plan of Care Reviewed With: patient  Outcome Evaluation: Pt seen for OT hiren this AM. Admitted with rhabdomyolysis after fall ambulating to the bathroom with his rollator at home. He reports he lives alone and uses a rollator for mobility. Pt with c/o of R hip, knee and wrist pain. No fxs noted on imaging. However pt reported increased R shoulder pain and demo very limited shoulder flexion due to increased pain limiting his participation with ADLs (nsg notified). Required max A for posterior hygiene and dep A for LBD.He required min A to the bathroom with rollator. Pt in increased pain and very forward flexed posture on rollator.  Unable to stand completely upright and pt held to front of rollator despite cues to correct. Pt seated UIC at the end with al needs in reach. Pt demo decreased endurance, activity tolerance, functional mobility, ADLs, balance, ROM and increased pain. Pt to benefit from skilled OT to address deficits. Rec dc to SNF at this time.     Time Calculation:   Evaluation Complexity (OT)  Review Occupational Profile/Medical/Therapy History Complexity: expanded/moderate complexity  Assessment, Occupational Performance/Identification of Deficit Complexity: 3-5 performance deficits  Clinical Decision Making Complexity (OT): detailed assessment/moderate complexity  Overall Complexity of Evaluation (OT): moderate complexity     Time Calculation- OT       Row Name 12/27/23 0957 12/27/23 0956          Time Calculation- OT    OT Start Time -- 0820  -KA     OT Stop Time -- 0908  -KA     OT Time Calculation (min) -- 48 min  -KA     OT Received On -- 12/27/23  -KA     OT - Next Appointment -- 12/28/23  -     OT Goal Re-Cert Due Date 01/10/24  -KA --        Timed Charges    05529 - OT Self Care/Mgmt Minutes -- 30  -KA        Untimed Charges    OT Eval/Re-eval Minutes -- 18  -KA        Total Minutes    Timed Charges Total Minutes -- 30  -KA     Untimed Charges Total Minutes -- 18  -KA      Total Minutes -- 48  -KA               User Key  (r) = Recorded By, (t) = Taken By, (c) = Cosigned By      Initials Name Provider Type    Dejah Padilla OT Occupational Therapist                  Therapy Charges for Today       Code Description Service Date Service Provider Modifiers Qty    [id number]  OT SELF CARE/MGMT/TRAIN EA 15 MIN 12/27/2023 Dejah Rush OT GO 2    [id number] HC OT EVAL MOD COMPLEXITY 3 12/27/2023 Dejah Rush OT GO 1                 Dejah Rush OT  12/27/2023

## 2023-12-27 NOTE — PLAN OF CARE
Goal Outcome Evaluation:  Plan of Care Reviewed With: patient           Outcome Evaluation: Pt is a 82 y/[facility] M [facility] fall with work-up revealing multiple contusions and rhabdomylysis. Multiple x-rays done which did not show any definite fractures but did not see imaging completed for R shoulder which is concerning given the fact of pt's inability to move R UE with severe pain with limited ROM noted. Pt reports he lives at an ILF with a elevator to enter and uses a rollator for mobility at baseline. Pt presents to PT with R sided pain from fall, generalized weakness, decreased endurance, and impaired functional mobility. Pt UIC - stood requiring mod A and ambulated in hospital room c rollator requiring min A. Pt demo's a flexed posture and slow pace. Cues for upright posture and UE placement as pt tends to grab the front bar on rollator - unable to correct due to pain. Pt requesting to return to bed requiring mod A for B LE. PT recommends SNF at D/C to address functional deficits as pt is unsafe to return to ILF at this time.      Anticipated Discharge Disposition (PT): skilled nursing facility

## 2023-12-28 LAB
ANION GAP SERPL CALCULATED.3IONS-SCNC: 6.8 MMOL/L (ref 5–15)
BASOPHILS # BLD AUTO: 0.03 10*3/MM3 (ref 0–0.2)
BASOPHILS NFR BLD AUTO: 0.9 % (ref 0–1.5)
BUN SERPL-MCNC: 16 MG/DL (ref 8–23)
BUN/CREAT SERPL: 16.2 (ref 7–25)
CALCIUM SPEC-SCNC: 7.8 MG/DL (ref 8.6–10.5)
CHLORIDE SERPL-SCNC: 111 MMOL/L (ref 98–107)
CK SERPL-CCNC: 613 U/L (ref 20–200)
CO2 SERPL-SCNC: 21.2 MMOL/L (ref 22–29)
CREAT SERPL-MCNC: 0.99 MG/DL (ref 0.76–1.27)
DEPRECATED RDW RBC AUTO: 47.9 FL (ref 37–54)
EGFRCR SERPLBLD CKD-EPI 2021: 74.7 ML/MIN/1.73
EOSINOPHIL # BLD AUTO: 0.01 10*3/MM3 (ref 0–0.4)
EOSINOPHIL NFR BLD AUTO: 0.3 % (ref 0.3–6.2)
ERYTHROCYTE [DISTWIDTH] IN BLOOD BY AUTOMATED COUNT: 16.5 % (ref 12.3–15.4)
GLUCOSE SERPL-MCNC: 97 MG/DL (ref 65–99)
HCT VFR BLD AUTO: 30.4 % (ref 37.5–51)
HGB BLD-MCNC: 9.9 G/DL (ref 13–17.7)
IMM GRANULOCYTES # BLD AUTO: 0 10*3/MM3 (ref 0–0.05)
IMM GRANULOCYTES NFR BLD AUTO: 0 % (ref 0–0.5)
LYMPHOCYTES # BLD AUTO: 1.65 10*3/MM3 (ref 0.7–3.1)
LYMPHOCYTES NFR BLD AUTO: 48 % (ref 19.6–45.3)
MCH RBC QN AUTO: 26.3 PG (ref 26.6–33)
MCHC RBC AUTO-ENTMCNC: 32.6 G/DL (ref 31.5–35.7)
MCV RBC AUTO: 80.6 FL (ref 79–97)
MONOCYTES # BLD AUTO: 0.51 10*3/MM3 (ref 0.1–0.9)
MONOCYTES NFR BLD AUTO: 14.8 % (ref 5–12)
NEUTROPHILS NFR BLD AUTO: 1.24 10*3/MM3 (ref 1.7–7)
NEUTROPHILS NFR BLD AUTO: 36 % (ref 42.7–76)
NRBC BLD AUTO-RTO: 0 /100 WBC (ref 0–0.2)
PLATELET # BLD AUTO: 177 10*3/MM3 (ref 140–450)
PMV BLD AUTO: 9.9 FL (ref 6–12)
POTASSIUM SERPL-SCNC: 3.9 MMOL/L (ref 3.5–5.2)
RBC # BLD AUTO: 3.77 10*6/MM3 (ref 4.14–5.8)
SODIUM SERPL-SCNC: 139 MMOL/L (ref 136–145)
WBC NRBC COR # BLD AUTO: 3.44 10*3/MM3 (ref 3.4–10.8)

## 2023-12-28 PROCEDURE — 97535 SELF CARE MNGMENT TRAINING: CPT

## 2023-12-28 PROCEDURE — 80048 BASIC METABOLIC PNL TOTAL CA: CPT | Performed by: HOSPITALIST

## 2023-12-28 PROCEDURE — 82550 ASSAY OF CK (CPK): CPT | Performed by: HOSPITALIST

## 2023-12-28 PROCEDURE — 85025 COMPLETE CBC W/AUTO DIFF WBC: CPT | Performed by: HOSPITALIST

## 2023-12-28 PROCEDURE — 97530 THERAPEUTIC ACTIVITIES: CPT

## 2023-12-28 RX ORDER — HYDROCODONE BITARTRATE AND ACETAMINOPHEN 5; 325 MG/1; MG/1
1 TABLET ORAL EVERY 6 HOURS PRN
Status: DISCONTINUED | OUTPATIENT
Start: 2023-12-28 | End: 2023-12-30 | Stop reason: HOSPADM

## 2023-12-28 RX ADMIN — GABAPENTIN 300 MG: 300 CAPSULE ORAL at 17:06

## 2023-12-28 RX ADMIN — GUAIFENESIN 600 MG: 600 TABLET, EXTENDED RELEASE ORAL at 08:28

## 2023-12-28 RX ADMIN — Medication 10 ML: at 20:41

## 2023-12-28 RX ADMIN — HYDROCODONE BITARTRATE AND ACETAMINOPHEN 1 TABLET: 5; 325 TABLET ORAL at 17:06

## 2023-12-28 RX ADMIN — GABAPENTIN 300 MG: 300 CAPSULE ORAL at 20:41

## 2023-12-28 RX ADMIN — GUAIFENESIN 600 MG: 600 TABLET, EXTENDED RELEASE ORAL at 20:41

## 2023-12-28 RX ADMIN — Medication 10 ML: at 08:29

## 2023-12-28 RX ADMIN — GABAPENTIN 300 MG: 300 CAPSULE ORAL at 08:28

## 2023-12-28 RX ADMIN — PANTOPRAZOLE SODIUM 40 MG: 40 TABLET, DELAYED RELEASE ORAL at 08:28

## 2023-12-28 RX ADMIN — FLUOXETINE HYDROCHLORIDE 20 MG: 20 CAPSULE ORAL at 08:29

## 2023-12-28 RX ADMIN — SENNOSIDES AND DOCUSATE SODIUM 2 TABLET: 50; 8.6 TABLET ORAL at 08:28

## 2023-12-28 NOTE — PROGRESS NOTES
"DAILY PROGRESS NOTE  [facility]    Patient Identification:  Name: Tony Leonard  Age: 83 y.o.  Sex: male  :  1940  MRN: [id number]         Primary Care Physician: Harpreet Kate MD    Subjective:  Interval History: He is very weak.  He complains of right shoulder pain.  Also some pain over right hip area with blistering of the skin.    Objective:    Scheduled Meds:FLUoxetine, 20 mg, Oral, Daily  gabapentin, 300 mg, Oral, TID  guaiFENesin, 600 mg, Oral, Q12H  pantoprazole, 40 mg, Oral, QAM AC  senna-docusate sodium, 2 tablet, Oral, BID  sodium chloride, 10 mL, Intravenous, Q12H      Continuous Infusions:       Vital signs in last 24 hours:  Temp:  [97.2 °F (36.2 °C)-98.4 °F (36.9 °C)] 98.4 °F (36.9 °C)  Heart Rate:  [62-75] 62  Resp:  [16] 16  BP: ()/(55-76) 120/72    Intake/Output:    Intake/Output Summary (Last 24 hours) at 2023 1635  Last data filed at 2023 1000  Gross per 24 hour   Intake --   Output 450 ml   Net -450 ml       Exam:  /72 (BP Location: Right arm, Patient Position: Lying)   Pulse 62   Temp 98.4 °F (36.9 °C) (Oral)   Resp 16   Ht 180.3 cm (71\")   Wt 74.8 kg (165 lb)   SpO2 96%   BMI 23.01 kg/m²     General Appearance:    Alert, cooperative, no distress   Head:    Normocephalic, without obvious abnormality, atraumatic   Eyes:       Throat:   Lips, tongue, gums normal   Neck:   Supple, symmetrical, trachea midline, no JVD   Lungs:     Clear to auscultation bilaterally, respirations unlabored   Chest Wall:    No tenderness or deformity    Heart:    Regular rate and rhythm, S1 and S2 normal, no murmur,no  Rub or gallop   Abdomen:     Soft, nontender, bowel sounds active, no masses, no organomegaly    Extremities:   Extremities normal, atraumatic, no cyanosis or edema   Pulses:      Skin:   Skin is warm and dry,  no rashes or palpable lesions   Neurologic:   no focal deficits noted      Lab Results (last 72 hours)       Procedure Component " Value Units Date/Time    Manual Differential [[id number]]  (Abnormal) Collected: 12/27/23 0620    Specimen: Blood Updated: 12/27/23 0736     Neutrophil % 64.6 %      Lymphocyte % 18.2 %      Monocyte % 15.2 %      Basophil % 2.0 %      Neutrophils Absolute 2.87 10*3/mm3      Lymphocytes Absolute 0.81 10*3/mm3      Monocytes Absolute 0.67 10*3/mm3      Basophils Absolute 0.09 10*3/mm3      Sylvester Cells Slight/1+     Ovalocytes Slight/1+     Poikilocytes Mod/2+     RBC Fragments Slight/1+     Smudge Cells Mod/2+     Platelet Morphology Normal    CBC Auto Differential [[id number]]  (Abnormal) Collected: 12/27/23 0620    Specimen: Blood Updated: 12/27/23 0736     WBC 4.44 10*3/mm3      RBC 4.07 10*6/mm3      Hemoglobin 10.9 g/dL      Hematocrit 34.0 %      MCV 83.5 fL      MCH 26.8 pg      MCHC 32.1 g/dL      RDW 16.5 %      RDW-SD 50.3 fl      MPV 9.7 fL      Platelets 189 10*3/mm3     Basic Metabolic Panel [[id number]]  (Abnormal) Collected: 12/27/23 0620    Specimen: Blood Updated: 12/27/23 0702     Glucose 90 mg/dL      BUN 13 mg/dL      Creatinine 0.89 mg/dL      Sodium 138 mmol/L      Potassium 3.9 mmol/L      Chloride 108 mmol/L      CO2 21.0 mmol/L      Calcium 7.8 mg/dL      BUN/Creatinine Ratio 14.6     Anion Gap 9.0 mmol/L      eGFR 84.0 mL/min/1.73     Narrative:      GFR Normal >60  Chronic Kidney Disease <60  Kidney Failure <15    The GFR formula is only valid for adults with stable renal function between ages 18 and 70.    CK [[id number]]  (Abnormal) Collected: 12/27/23 0620    Specimen: Blood Updated: 12/27/23 0702     Creatine Kinase 1,018 U/L     Manual Differential [[id number]]  (Abnormal) Collected: 12/26/23 0744    Specimen: Blood from Arm, Left Updated: 12/26/23 0843     Neutrophil % 63.9 %      Lymphocyte % 18.6 %      Monocyte % 15.5 %      Basophil % 2.1 %      Neutrophils Absolute 3.73 10*3/mm3      Lymphocytes Absolute 1.08 10*3/mm3      Monocytes Absolute 0.90 10*3/mm3      Basophils Absolute 0.12  10*3/mm3      nRBC 2.1 /100 WBC      Hypochromia Mod/2+     Ovalocytes Slight/1+     Smudge Cells Slight/1+     Platelet Morphology Normal    Comprehensive Metabolic Panel [[id number]]  (Abnormal) Collected: 12/26/23 0744    Specimen: Blood from Arm, Left Updated: 12/26/23 0814     Glucose 85 mg/dL      BUN 17 mg/dL      Creatinine 0.93 mg/dL      Sodium 136 mmol/L      Potassium 3.9 mmol/L      Chloride 104 mmol/L      CO2 22.1 mmol/L      Calcium 8.7 mg/dL      Total Protein 6.1 g/dL      Albumin 3.7 g/dL      ALT (SGPT) 26 U/L      AST (SGOT) 54 U/L      Alkaline Phosphatase 58 U/L      Total Bilirubin 0.6 mg/dL      Globulin 2.4 gm/dL      A/G Ratio 1.5 g/dL      BUN/Creatinine Ratio 18.3     Anion Gap 9.9 mmol/L      eGFR 80.5 mL/min/1.73     Narrative:      GFR Normal >60  Chronic Kidney Disease <60  Kidney Failure <15    The GFR formula is only valid for adults with stable renal function between ages 18 and 70.    CK [[id number]]  (Abnormal) Collected: 12/26/23 0744    Specimen: Blood from Arm, Left Updated: 12/26/23 0814     Creatine Kinase 1,782 U/L     CBC & Differential [[id number]]  (Abnormal) Collected: 12/26/23 0744    Specimen: Blood from Arm, Left Updated: 12/26/23 0758    Narrative:      The following orders were created for panel order CBC & Differential.  Procedure                               Abnormality         Status                     ---------                               -----------         ------                     CBC Auto Differential[[id number]]        Abnormal            Final result                 Please view results for these tests on the individual orders.    CBC Auto Differential [[id number]]  (Abnormal) Collected: 12/26/23 0744    Specimen: Blood from Arm, Left Updated: 12/26/23 0758     WBC 5.83 10*3/mm3      RBC 4.23 10*6/mm3      Hemoglobin 11.3 g/dL      Hematocrit 35.6 %      MCV 84.2 fL      MCH 26.7 pg      MCHC 31.7 g/dL      RDW 16.8 %      RDW-SD 52.0 fl      MPV 9.6 fL       Platelets 208 10*3/mm3      nRBC 0.2 /100 WBC           Data Review:  Results from last 7 days   Lab Units 12/28/23  0733 12/27/23  0620 12/26/23  0744   SODIUM mmol/L 139 138 136   POTASSIUM mmol/L 3.9 3.9 3.9   CHLORIDE mmol/L 111* 108* 104   CO2 mmol/L 21.2* 21.0* 22.1   BUN mg/dL 16 13 17   CREATININE mg/dL 0.99 0.89 0.93   GLUCOSE mg/dL 97 90 85   CALCIUM mg/dL 7.8* 7.8* 8.7     Results from last 7 days   Lab Units 12/28/23  0733 12/27/23  0620 12/26/23  0744   WBC 10*3/mm3 3.44 4.44 5.83   HEMOGLOBIN g/dL 9.9* 10.9* 11.3*   HEMATOCRIT % 30.4* 34.0* 35.6*   PLATELETS 10*3/mm3 177 189 208             Lab Results   Lab Value Date/Time    TROPONINT 59 (C) 12/22/2023 0737    TROPONINT 81 (C) 12/22/2023 0134    TROPONINT 65 (C) 12/21/2023 2339    TROPONINT 39 (H) 07/12/2023 0209    TROPONINT 36 (H) 07/11/2023 2110    TROPONINT 26 (H) 05/10/2023 0503    TROPONINT 32 (H) 05/10/2023 0218    TROPONINT 0.131 (C) 01/30/2023 0415    TROPONINT 0.418 (C) 01/29/2023 0538    TROPONINT 0.393 (C) 01/29/2023 0344    TROPONINT 0.167 (C) 01/29/2023 0133    TROPONINT 0.012 12/29/2022 1204    TROPONINT 0.089 (C) 12/10/2022 1003    TROPONINT 0.158 (C) 12/10/2022 0206    TROPONINT 0.079 (C) 12/09/2022 2343    TROPONINT 0.024 11/26/2022 0211    TROPONINT <0.010 11/13/2022 1437    TROPONINT <0.010 05/07/2022 2042    TROPONINT <0.010 04/26/2022 1423    TROPONINT <0.010 04/16/2018 2024    TROPONINT <0.010 11/23/2017 1843    TROPONINT <0.010 10/17/2017 2045    TROPONINT <0.010 06/28/2017 2114    TROPONINT <0.010 06/17/2017 1624    TROPONINT <0.010 05/31/2017 2026    TROPONINT <0.010 04/27/2017 2140    TROPONINT <0.010 03/26/2017 1335    TROPONINT <0.010 12/27/2016 1749    TROPONINT <0.010 12/21/2016 1309    TROPONINT <0.010 11/18/2016 1601    TROPONINT <0.010 10/04/2016 0606    TROPONINT <0.010 10/03/2016 1009         Results from last 7 days   Lab Units 12/26/23  0744 12/21/23  2339   ALK PHOS U/L 58 60   BILIRUBIN mg/dL 0.6 0.2   ALT  "(SGPT) U/L 26 15   AST (SGOT) U/L 54* 19             No results found for: \"POCGLU\"        Past Medical History:   Diagnosis Date    ADHD (attention deficit hyperactivity disorder)     Bronchiectasis     CHF (congestive heart failure)     Colon polyp     COPD (chronic obstructive pulmonary disease)     Diverticulosis     Hard of hearing     On home oxygen therapy     2 LITERS    Pneumonia     Prostate cancer 04/22/2019    Spinal stenosis, lumbar region with neurogenic claudication 08/02/2022       Assessment:  Active Hospital Problems    Diagnosis  POA    **Rhabdomyolysis [M62.82]  Yes    Multiple contusions [T07.XXXA]  Unknown    Fall [W19.XXXA]  Unknown    Spinal stenosis, lumbar region with neurogenic claudication [M48.062]  Yes    HTN (hypertension) [I10]  Yes    Prostate cancer [C61]  Yes    Spondylolisthesis of lumbar region [M43.16]  Yes    Chronic diastolic CHF (congestive heart failure) [I50.32]  Yes    Dyslipidemia [E78.5]  Yes    Gastroesophageal reflux disease [K21.9]  Yes      Resolved Hospital Problems   No resolved problems to display.       Plan:  DC IV fluids and follow-up labs.  DC planning.  He is very weak likely will need skilled nursing unit for rehab.  Discussed with case management.  They are looking for placement.    Eduardo Hanks MD  12/28/2023  16:35 EST   "

## 2023-12-28 NOTE — CASE MANAGEMENT/SOCIAL WORK
Discharge Planning Assessment  [facility]     Patient Name: Tony Leonard  MRN: [id number]  Today's Date: 12/28/2023    Admit Date: 12/26/2023    Plan: SNF pending referrals.   Discharge Needs Assessment       Row Name 12/28/23 1700       Living Environment    People in Home facility resident    Current Living Arrangements assisted living facility    Potentially Unsafe Housing Conditions none    Primary Care Provided by self    Provides Primary Care For no one    Family Caregiver if Needed other (see comments)    Quality of Family Relationships unable to assess       Resource/Environmental Concerns    Resource/Environmental Concerns none    Transportation Concerns none       Transition Planning    Patient/Family Anticipates Transition to other (see comments)    Patient/Family Anticipated Services at Transition none       Discharge Needs Assessment    Readmission Within the Last 30 Days no previous admission in last 30 days    Equipment Currently Used at Home oxygen;nebulizer;respiratory supplies;rollator;cane, straight    Concerns to be Addressed discharge planning    Anticipated Changes Related to Illness none    Equipment Needed After Discharge none                   Discharge Plan       Row Name 12/28/23 5925       Plan    Plan SNF pending referrals.    Roadmap to Recovery Yes    Patient/Family in Agreement with Plan yes    Provided Post Acute Provider List? Yes    Post Acute Provider List Nursing Home    Provided Post Acute Provider Quality & Resource List? Yes    Post Acute Provider Quality and Resource List Nursing Home    Delivered To Patient    Method of Delivery In person    Plan Comments Spoke with patient at bedside. Introduced self and explained CCP role. Verified face sheet and local pharmacy is [facility], patient [facility]; patient choice to enroll in [facility]. Patient denies difficulty with medication cost/ management.  Patient has used [facility]. Patient has been to [facility] for SNF. Patient has a rollator, cane, HS O2 through [facility], and nebulizer. Patient lives at [facility]. Patient plans to SNF at discharge. Patient request referral to [facility]. Referrals placed.                  Continued Care and Services - Admitted Since 12/26/2023       Destination       Service Provider Request Status Selected Services Address Phone Fax Patient Preferred    [facility] Pending - Request Sent N/A [address] [facility] [id number] [phone] --    [facility] Pending - Request Sent N/A [address] [facility] [address] [phone] --                  Selected Continued Care - Episodes Includes continued care and service providers with selected services from the active episodes listed below      High Risk Care Management Episode start date: 12/13/2022   There are no active outsourced providers for this episode.                 Expected Discharge Date and Time       Expected Discharge Date Expected Discharge Time    Dec 30, 2023            Demographic Summary       Row Name 12/28/23 1653       General Information    Admission Type inpatient    Required Notices Provided Important Message from Medicare    Referral Source admission list    Reason for Consult discharge planning    Preferred Language English                   Functional Status       Row Name 12/28/23 1650       Functional Status    Usual Activity Tolerance fair    Current Activity Tolerance poor       Functional Status, IADL    Medications independent    Meal Preparation assistive person    Housekeeping assistive person    Laundry assistive person    Shopping assistive person       Mental Status    General Appearance WDL WDL                   Psychosocial    No documentation.                  Abuse/Neglect    No documentation.                  Legal    No documentation.                  Substance Abuse    No documentation.                   Patient Forms    No documentation.                     Gwen Cruz RN

## 2023-12-28 NOTE — PLAN OF CARE
Goal Outcome Evaluation:  Plan of Care Reviewed With: patient        Progress: no change  Outcome Evaluation: Pt seen for OT session this pm, focus on standing ADLs and fxnl xfers. Pt Min/MOD A x 1-2  for supine to sit EOB. Pt SBA for sitting bal EOB, Min A for donning slip on shoes. Mod/Max A x2 for STS from EOB using rollator. Pt noted to be soiled and was MAX A for changing brief and donaldo hygiene standing supported. He was Mod A x2 w/ rollator for fxnl mob ambulating for short distance from bed to chair. Pt left [facility] at session end w/ all needs met and PT present. DC rec to SNF. OT will continue to monitor.      Anticipated Discharge Disposition (OT): skilled nursing facility

## 2023-12-28 NOTE — PLAN OF CARE
Goal Outcome Evaluation:  Plan of Care Reviewed With: patient        Progress: improving  Outcome Evaluation: Pt was agreeable to working with PT. He was standing in the room wiht OT on arrival. He maintained standing balance with Alejandra and rollator while brief was changed. He ambulated 5' to the chair with minAx2 ad rollator. He completed seated ther ex. Pt fatigued quickly and c/o of mild cramping in his LEs following exercises. Session ended with pt [facility] and all needs met within reach. Pt will benefit from continued skilled PT addresing limitations in functional mobility to maximize safety and independence.

## 2023-12-28 NOTE — THERAPY TREATMENT NOTE
Patient Name: Tony Leonard  : 1940    MRN: [id number]                              Today's Date: 2023       Admit Date: 2023    Visit Dx:     ICD-10-CM ICD-9-CM   1. Non-traumatic rhabdomyolysis  M62.82 728.88   2. Multiple contusions  T07.XXXA 924.8   3. Fall, initial encounter  W19.XXXA E888.9   4. Primary hypertension  I10 401.9   5. Hospital discharge follow-up  Z09 V67.59   6. Chronic diastolic congestive heart failure  I50.32 428.32     428.0   7. Saddle embolus of pulmonary artery, unspecified chronicity, unspecified whether acute cor pulmonale present  I26.92 415.13   8. Obesity with alveolar hypoventilation and serious comorbidity, unspecified classification  E66.2 278.03   9. Abdominal aortic aneurysm (AAA) without rupture, unspecified part  I71.40 441.4     Patient Active Problem List   Diagnosis    Thrush, oral    ADD (attention deficit disorder)    Hemorrhoids    Bronchiectasis with acute lower respiratory infection    Diverticul disease small and large intestine, no perforati or abscess    Benign non-nodular prostatic hyperplasia without lower urinary tract symptoms    Gastroesophageal reflux disease    Malaise and fatigue    Environmental allergies    Hemoptysis    Dyslipidemia    Primary osteoarthritis involving multiple joints    Pneumonia of right lower lobe due to infectious organism    Abnormal EKG    COPD (chronic obstructive pulmonary disease)    Mild malnutrition    Acute on chronic respiratory failure with hypoxia    Fracture, intertrochanteric, right femur, closed, initial encounter    Chronic diastolic CHF (congestive heart failure)    Hematoma of frontal scalp    Postoperative anemia due to acute blood loss    Urinary retention    Hemorrhagic disorder due to circulating anticoagulants    History of fracture of right hip    Closed fracture of right hip with routine healing    Chronic low back pain with sciatica    Change in bowel function    Rectal bleeding     Prostate cancer    On home oxygen therapy    Acute viral bronchitis    Peripheral neuropathy    Plantar fasciitis    HTN (hypertension)    COPD exacerbation    Bilateral lower extremity edema    Microscopic hematuria    Acute midline low back pain with right-sided sciatica    Spinal stenosis, lumbar region with neurogenic claudication    Compression deformity of vertebra    Rectal bleed    Esophagitis    Angiectasia    Hiatal hernia    Overweight (BMI 25.0-29.9)    Leukocytosis    Bilateral hip pain    Elevated troponin level not due myocardial infarction    Nocturnal hypoxia    Pneumonia of right upper lobe due to infectious organism    NSTEMI (non-ST elevated myocardial infarction)    Chronic respiratory failure with hypoxia    Paroxysmal atrial fibrillation    Acute exacerbation of chronic obstructive pulmonary disease (COPD)    Anemia    Non-compliant patient    Hypokalemia    Spondylolisthesis of lumbar region    Elevated troponin    Rhabdomyolysis    Multiple contusions    Fall     Past Medical History:   Diagnosis Date    ADHD (attention deficit hyperactivity disorder)     Bronchiectasis     CHF (congestive heart failure)     Colon polyp     COPD (chronic obstructive pulmonary disease)     Diverticulosis     Hard of hearing     On home oxygen therapy     2 LITERS    Pneumonia     Prostate cancer 04/22/2019    Spinal stenosis, lumbar region with neurogenic claudication 08/02/2022     Past Surgical History:   Procedure Laterality Date    BRONCHOSCOPY N/A 4/30/2016    Procedure: BRONCHOSCOPY with BAL of right lower lobe and left  lower lobe.  ;  Surgeon: Nando Diaz MD;  Location: [facility] ENDOSCOPY;  Service:     BRONCHOSCOPY N/A 4/28/2017    Procedure: BRONCHOSCOPY;  Surgeon: Katharina Salter MD;  Location: [facility] ENDOSCOPY;  Service:     BRONCHOSCOPY Bilateral 6/29/2017    Procedure: BRONCHOSCOPY WITH WASHINGS;  Surgeon: Katharina Salter MD;  Location: [facility] ENDOSCOPY;  Service:     BRONCHOSCOPY N/A  1/22/2018    Procedure: BRONCHOSCOPY AT BEDSIDE with BAL;  Surgeon: Katharina Salter MD;  Location: [facility] ENDOSCOPY;  Service:     BRONCHOSCOPY N/A 1/30/2018    Procedure: BRONCHOSCOPY with BAL and washing;  Surgeon: Shreyas Wild MD;  Location: [facility] ENDOSCOPY;  Service:     BRONCHOSCOPY Bilateral 4/24/2018    Procedure: BRONCHOSCOPY WITH WASHING;  Surgeon: Katharina Salter MD;  [facility];  Service: Pulmonary    BRONCHOSCOPY N/A 12/28/2019    Procedure: BRONCHOSCOPY with bilateral washing;  Surgeon: Katharina Salter MD;  Location: [facility] ENDOSCOPY;  Service: Pulmonary    BRONCHOSCOPY Bilateral 1/4/2023    Procedure: BRONCHOSCOPY with lavage;  Surgeon: Katharina Salter MD;  Location: [facility] ENDOSCOPY;  Service: Pulmonary;  Laterality: Bilateral;  mucus plugging    CARDIAC CATHETERIZATION N/A 1/29/2023    Procedure: Left Heart Cath;  Surgeon: Johnnie Ang MD;  Location: [facility] CATH INVASIVE LOCATION;  Service: Cardiology;  Laterality: N/A;    CARDIAC CATHETERIZATION N/A 1/29/2023    Procedure: Coronary angiography;  Surgeon: Johnnie Ang MD;  Location: [facility] CATH INVASIVE LOCATION;  Service: Cardiology;  Laterality: N/A;    COLONOSCOPY  05/17/2013    eh, ih, tort, sig tics    COLONOSCOPY N/A 5/10/2019    Non-thrombosed external hemorrhoids found on perianal exam, Diverticulosis, Tortuous colon, One 5 mm polyp in the mid ascending colon, IH. Path: Tubular adenoma.     COLONOSCOPY N/A 9/24/2022    Procedure: COLONOSCOPY to cecum and TI with argon plasma coagulation.;  Surgeon: Bruce Black MD;  Location: [facility] ENDOSCOPY;  Service: Gastroenterology;  Laterality: N/A;  pre- GI bleeding  post- radiation proctitis, diverticulosis    ENDOSCOPY  03/19/2015    z line irreg, hh    ENDOSCOPY N/A 9/24/2022    Procedure: ESOPHAGOGASTRODUODENOSCOPY;  Surgeon: Bruce Black MD;  Location: [facility] ENDOSCOPY;  Service: Gastroenterology;  Laterality: N/A;  pre- GI bleeding  post- esophagitis,  hiatal hernia    HIP OPEN REDUCTION Right 1/17/2018    Procedure: HIP OPEN REDUCTION INTERNAL FIXATION WITH DYNAMIC HIP SCREW;  Surgeon: Les Black MD;  Location: [facility] OR;  Service:     SPINE SURGERY      TONSILLECTOMY      TOTAL HIP ARTHROPLASTY REVISION Right 9/23/2019    Procedure: HIP  REVISION RIGHT;  Surgeon: Isauro Warner MD;  Location: [facility] OR;  Service: Orthopedics      General Information       Row Name 12/28/23 1611          OT Time and Intention    Document Type therapy note (daily note)  -PP     Mode of Treatment individual therapy;occupational therapy  -PP       Row Name 12/28/23 1611          General Information    Patient Profile Reviewed yes  -PP     Existing Precautions/Restrictions fall  -PP     Barriers to Rehab none identified  -PP       Row Name 12/28/23 1611          Cognition    Orientation Status (Cognition) oriented x 3  -PP       Row Name 12/28/23 1611          Safety Issues, Functional Mobility    Impairments Affecting Function (Mobility) endurance/activity tolerance;balance;strength;range of motion (ROM);pain  -PP     Comment, Safety Issues/Impairments (Mobility) gait belt and non skid socks worn for safety; tech support to maximize therapeutic benefit and safety for pt and staff.  -PP               User Key  (r) = Recorded By, (t) = Taken By, (c) = Cosigned By      Initials Name Provider Type    PP Jair Costello OT Occupational Therapist                     Mobility/ADL's       Row Name 12/28/23 1612          Bed Mobility    Bed Mobility sit-supine  -PP     Supine-Sit [place] (Bed Mobility) minimum assist (75% patient effort);1 person assist;moderate assist (50% patient effort);2 person assist  -PP     Sit-Supine [place] (Bed Mobility) not tested  -PP     Comment, (Bed Mobility) Pt left [facility] at session end  -PP       Row Name 12/28/23 1612          Transfers    Transfers sit-stand transfer  -PP       Row Name 12/28/23 1612          Sit-Stand  Transfer    Sit-Stand [place] (Transfers) moderate assist (50% patient effort);verbal cues;nonverbal cues (demo/gesture);2 person assist;maximum assist (25% patient effort)  -PP     Assistive Device (Sit-Stand Transfers) other (see comments)  rollator  -PP       Row Name 12/28/23 [facility]          Functional Mobility    Functional Mobility- Ind. Level moderate assist (50% patient effort);2 person assist required  -PP     Functional Mobility- Device other (see comments)  rollator  -PP     Functional Mobility- Comment pt able to ambulate short household distance from bed to chair using rollator and  -PP       Row Name 12/28/23 [facility]          Activities of Daily Living    BADL Assessment/Intervention lower body dressing;upper body dressing  -PP       Row Name 12/28/23 [facility]          Lower Body Dressing Assessment/Training    [place] Level (Lower Body Dressing) don;shoes/slippers;minimum assist (75% patient effort)  -PP     Position (Lower Body Dressing) supported sitting  -PP       Row Name 12/28/23 1612          Toileting Assessment/Training    [place] Level (Toileting) adjust/manage clothing;change pad/brief;perform perineal hygiene;maximum assist (25% patient effort)  -PP     Position (Toileting) supported standing  -PP     Comment, (Toileting) Pt noted to be soiled while laying in bed  -PP       Row Name 12/28/23 [facility]          Upper Body Dressing Assessment/Training    [place] Level (Upper Body Dressing) don;pull-over garment;doff  -PP     Position (Upper Body Dressing) supported sitting  -PP     Comment, (Upper Body Dressing) hospital gown  -PP               User Key  (r) = Recorded By, (t) = Taken By, (c) = Cosigned By      Initials Name Provider Type    PP Jair Costello, OT Occupational Therapist                   Obj/Interventions       Row Name 12/28/23 1616          Balance    Static Sitting Balance standby assist  -PP     Dynamic Sitting Balance standby assist  -PP     Position, Sitting  Balance unsupported;sitting edge of bed  -PP     Static Standing Balance 2-person assist;minimal assist  -PP     Dynamic Standing Balance moderate assist;2-person assist  -PP     Position/Device Used, Standing Balance supported;other (see comments)  rollator  -PP     Balance Interventions standing;sitting  -PP     Comment, Balance Pt MIN/MOD A for standing ADLs and fxnl xfers.  -PP               User Key  (r) = Recorded By, (t) = Taken By, (c) = Cosigned By      Initials Name Provider Type    PP Jair Costello OT Occupational Therapist                   Goals/Plan    No documentation.                  Clinical Impression       Row Name 12/28/23 1623          Pain Assessment    Pretreatment Pain Rating 0/10 - no pain  -PP     Posttreatment Pain Rating 6/10  -PP     Pre/Posttreatment Pain Comment pain located all over his body and specifically in R shoulder and RLE  -PP       Row Name 12/28/23 1623          Plan of Care Review    Plan of Care Reviewed With patient  -PP     Progress no change  -PP     Outcome Evaluation Pt seen for OT session this pm, focus on standing ADLs and fxnl xfers. Pt Min/MOD A x 1-2  for supine to sit EOB. Pt SBA for sitting bal EOB, Min A for donning slip on shoes. Mod/Max A x2 for STS from EOB using rollator. Pt noted to be soiled and was MAX A for changing brief and donaldo hygiene standing supported. He was Mod A x2 w/ rollator for fxnl mob ambulating for short distance from bed to chair. Pt left [facility] at session end w/ all needs met and PT present. DC rec to SNF. OT will continue to monitor.  -PP       Row Name 12/28/23 1623          Therapy Plan Review/Discharge Plan (OT)    Anticipated Discharge Disposition (OT) skilled nursing facility  -PP       Row Name 12/28/23 1623          Vital Signs    Pre Patient Position Supine  -PP     Intra Patient Position Standing  -PP     Post Patient Position Sitting  -PP       Row Name 12/28/23 1623          Positioning and Restraints    Pre-Treatment  Position in bed  -PP     Post Treatment Position chair  -PP     In Chair call light within reach;encouraged to call for assist;exit alarm on;sitting;with PT  -PP               User Key  (r) = Recorded By, (t) = Taken By, (c) = Cosigned By      Initials Name Provider Type    PP Jair Costello OT Occupational Therapist                   Outcome Measures       Row Name 12/28/23 1624          How much help from another is currently needed...    Putting on and taking off regular lower body clothing? 1  -PP     Bathing (including washing, rinsing, and drying) 2  -PP     Toileting (which includes using toilet bed pan or urinal) 1  -PP     Putting on and taking off regular upper body clothing 2  -PP     Taking care of personal grooming (such as brushing teeth) 3  -PP     Eating meals 3  -PP     AM-PAC 6 Clicks Score (OT) 12  -PP       Row Name 12/28/23 1517 12/28/23 0828       How much help from another person do you currently need...    Turning from your back to your side while in flat bed without using bedrails? 3  -LW 3  -GK    Moving from lying on back to sitting on the side of a flat bed without bedrails? 2  -LW 2  -GK    Moving to and from a bed to a chair (including a wheelchair)? 2  -LW 2  -GK    Standing up from a chair using your arms (e.g., wheelchair, bedside chair)? 2  -LW 1  -GK    Climbing 3-5 steps with a railing? 1  -LW 1  -GK    To walk in hospital room? 2  -LW 2  -GK    AM-PAC 6 Clicks Score (PT) 12  -LW 11  -GK    Highest Level of Mobility Goal 4 --> Transfer to chair/commode  -LW 4 --> Transfer to chair/commode  -GK      Row Name 12/28/23 1624          Modified [place]lan Scale    Modified [place] Scale 4 - Moderately severe disability.  Unable to walk without assistance, and unable to attend to own bodily needs without assistance.  -PP       Row Name 12/28/23 1624          Functional Assessment    Outcome Measure Options AM-PAC 6 Clicks Daily Activity (OT)  -PP               User Key  (r) = Recorded  By, (t) = Taken By, (c) = Cosigned By      Initials Name Provider Type    Sue Calloway, RN Registered Nurse    Iesha Tabor, PT Physical Therapist    Jair Ashley, OT Occupational Therapist                    Occupational Therapy Education       Title: PT OT SLP Therapies (In Progress)       Topic: Occupational Therapy (Done)       Point: ADL training (Done)       Description:   Instruct learner(s) on proper safety adaptation and remediation techniques during self care or transfers.   Instruct in proper use of assistive devices.                  Learning Progress Summary             Patient Acceptance, E, VU,DU by  at 12/27/2023 0956                         Point: Home exercise program (Done)       Description:   Instruct learner(s) on appropriate technique for monitoring, assisting and/or progressing therapeutic exercises/activities.                  Learning Progress Summary             Patient Acceptance, E, VU,DU by  at 12/27/2023 0956                         Point: Precautions (Done)       Description:   Instruct learner(s) on prescribed precautions during self-care and functional transfers.                  Learning Progress Summary             Patient Acceptance, E, VU,DU by  at 12/27/2023 0956                                         User Key       Initials Effective Dates Name Provider Type Discipline     09/22/22 -  Dejah Rush, OT Occupational Therapist OT                  OT Recommendation and Plan     Plan of Care Review  Plan of Care Reviewed With: patient  Progress: no change  Outcome Evaluation: Pt seen for OT session this pm, focus on standing ADLs and fxnl xfers. Pt Min/MOD A x 1-2  for supine to sit EOB. Pt SBA for sitting bal EOB, Min A for donning slip on shoes. Mod/Max A x2 for STS from EOB using rollator. Pt noted to be soiled and was MAX A for changing brief and donaldo hygiene standing supported. He was Mod A x2 w/ rollator for fxnl mob ambulating for short distance  from bed to chair. Pt left [facility] at session end w/ all needs met and PT present. DC rec to SNF. OT will continue to monitor.     Time Calculation:         Time Calculation- OT       Row Name 12/28/23 1624             Time Calculation- OT    OT Start Time 1420  -PP      OT Stop Time 1434  -PP      OT Time Calculation (min) 14 min  -PP      Total Timed Code Minutes- OT 14 minute(s)  -PP      OT Received On 12/28/23  -PP      OT - Next Appointment 12/29/23  -PP         Timed Charges    15875 - OT Self Care/Mgmt Minutes 14  -PP         Total Minutes    Timed Charges Total Minutes 14  -PP       Total Minutes 14  -PP                User Key  (r) = Recorded By, (t) = Taken By, (c) = Cosigned By      Initials Name Provider Type    PP Jair Costello OT Occupational Therapist                  Therapy Charges for Today       Code Description Service Date Service Provider Modifiers Qty    [id number] HC OT SELF CARE/MGMT/TRAIN EA 15 MIN 12/28/2023 Jair Costello OT GO 1                 Jair Costello OT  12/28/2023

## 2023-12-28 NOTE — DISCHARGE PLACEMENT REQUEST
"Tony Casey (83 y.o. Male)       Date of Birth   1940    Social Security Number       Address   [address] [place] [address]    Home Phone   [phone]    MRN   [id number]       Scientologist   Jewish    Marital Status   Single                            Admission Date   12/26/23    Admission Type   Emergency    Admitting Provider   Eduardo Hanks MD    Attending Provider   Eduardo Hanks MD    Department, Room/Bed   [facility] [address] [place], P397/1       Discharge Date       Discharge Disposition       Discharge Destination                                 Attending Provider: Eduardo Hanks MD    Allergies: Daliresp [Roflumilast], Latex, Sulfa Antibiotics    Isolation: None   Infection: None   Code Status: CPR    Ht: 180.3 cm (71\")   Wt: 74.8 kg (165 lb)    Admission Cmt: None   Principal Problem: Rhabdomyolysis [M62.82]                   Active Insurance as of 12/26/2023       Primary Coverage       Payor Plan Insurance Group Employer/Plan Group    MEDICARE MEDICARE A & B        Payor Plan Address Payor Plan Phone Number Payor Plan Fax Number Effective Dates    [address] [phone]  5/1/2003 - None Entered    [facility] [id number]         Subscriber Name Subscriber Birth Date Member ID       TONY CASEY 1940 [id number]               Secondary Coverage       Payor Plan Insurance Group Employer/Plan Group     FOR LIFE  FOR LIFE  SUP         Payor Plan Address Payor Plan Phone Number Payor Plan Fax Number Effective Dates    [address] [phone]  3/10/2016 - None Entered    [facility] [address]         Subscriber Name Subscriber Birth Date Member ID       TONY CASEY 1940 [id number]                     Emergency Contacts        (Rel.) Home Phone Work Phone Mobile Phone    CARMELAJOJOWILLIAM (Son) [phone] -- [phone]    Frederick Casey (Son) [phone] -- --    Ricardo Casey (Son) [phone] -- [phone]      "

## 2023-12-28 NOTE — PLAN OF CARE
Goal Outcome Evaluation:  Plan of Care Reviewed With: patient        Progress: improving  Outcome Evaluation: Pt AO*4, reports 6/10 pain in hips and legs, tylenol given x1 and offered more frequently but pt refused. VSS, short of breath following ambulation to bathroom, placed on 2L O2 for comfort. Pt ambulates with walker and assist x1-2 to get out of bed, voids to toilet/urinal and had a BM overnight. Small blister identified on R hip, see media for photo. Continues on IVF, good PO intake. Polite and cooperative with cares.

## 2023-12-28 NOTE — THERAPY TREATMENT NOTE
Patient Name: Tony Leonard  : 1940    MRN: [id number]                              Today's Date: 2023       Admit Date: 2023    Visit Dx:     ICD-10-CM ICD-9-CM   1. Non-traumatic rhabdomyolysis  M62.82 728.88   2. Multiple contusions  T07.XXXA 924.8   3. Fall, initial encounter  W19.XXXA E888.9   4. Primary hypertension  I10 401.9   5. Hospital discharge follow-up  Z09 V67.59   6. Chronic diastolic congestive heart failure  I50.32 428.32     428.0   7. Saddle embolus of pulmonary artery, unspecified chronicity, unspecified whether acute cor pulmonale present  I26.92 415.13   8. Obesity with alveolar hypoventilation and serious comorbidity, unspecified classification  E66.2 278.03   9. Abdominal aortic aneurysm (AAA) without rupture, unspecified part  I71.40 441.4     Patient Active Problem List   Diagnosis    Thrush, oral    ADD (attention deficit disorder)    Hemorrhoids    Bronchiectasis with acute lower respiratory infection    Diverticul disease small and large intestine, no perforati or abscess    Benign non-nodular prostatic hyperplasia without lower urinary tract symptoms    Gastroesophageal reflux disease    Malaise and fatigue    Environmental allergies    Hemoptysis    Dyslipidemia    Primary osteoarthritis involving multiple joints    Pneumonia of right lower lobe due to infectious organism    Abnormal EKG    COPD (chronic obstructive pulmonary disease)    Mild malnutrition    Acute on chronic respiratory failure with hypoxia    Fracture, intertrochanteric, right femur, closed, initial encounter    Chronic diastolic CHF (congestive heart failure)    Hematoma of frontal scalp    Postoperative anemia due to acute blood loss    Urinary retention    Hemorrhagic disorder due to circulating anticoagulants    History of fracture of right hip    Closed fracture of right hip with routine healing    Chronic low back pain with sciatica    Change in bowel function    Rectal bleeding     Prostate cancer    On home oxygen therapy    Acute viral bronchitis    Peripheral neuropathy    Plantar fasciitis    HTN (hypertension)    COPD exacerbation    Bilateral lower extremity edema    Microscopic hematuria    Acute midline low back pain with right-sided sciatica    Spinal stenosis, lumbar region with neurogenic claudication    Compression deformity of vertebra    Rectal bleed    Esophagitis    Angiectasia    Hiatal hernia    Overweight (BMI 25.0-29.9)    Leukocytosis    Bilateral hip pain    Elevated troponin level not due myocardial infarction    Nocturnal hypoxia    Pneumonia of right upper lobe due to infectious organism    NSTEMI (non-ST elevated myocardial infarction)    Chronic respiratory failure with hypoxia    Paroxysmal atrial fibrillation    Acute exacerbation of chronic obstructive pulmonary disease (COPD)    Anemia    Non-compliant patient    Hypokalemia    Spondylolisthesis of lumbar region    Elevated troponin    Rhabdomyolysis    Multiple contusions    Fall     Past Medical History:   Diagnosis Date    ADHD (attention deficit hyperactivity disorder)     Bronchiectasis     CHF (congestive heart failure)     Colon polyp     COPD (chronic obstructive pulmonary disease)     Diverticulosis     Hard of hearing     On home oxygen therapy     2 LITERS    Pneumonia     Prostate cancer 04/22/2019    Spinal stenosis, lumbar region with neurogenic claudication 08/02/2022     Past Surgical History:   Procedure Laterality Date    BRONCHOSCOPY N/A 4/30/2016    Procedure: BRONCHOSCOPY with BAL of right lower lobe and left  lower lobe.  ;  Surgeon: Nando Diaz MD;  Location: [facility] ENDOSCOPY;  Service:     BRONCHOSCOPY N/A 4/28/2017    Procedure: BRONCHOSCOPY;  Surgeon: Katharina Salter MD;  Location: [facility] ENDOSCOPY;  Service:     BRONCHOSCOPY Bilateral 6/29/2017    Procedure: BRONCHOSCOPY WITH WASHINGS;  Surgeon: Katharina Salter MD;  Location: [facility] ENDOSCOPY;  Service:     BRONCHOSCOPY N/A  1/22/2018    Procedure: BRONCHOSCOPY AT BEDSIDE with BAL;  Surgeon: Katharina Salter MD;  Location: [facility] ENDOSCOPY;  Service:     BRONCHOSCOPY N/A 1/30/2018    Procedure: BRONCHOSCOPY with BAL and washing;  Surgeon: Shreyas Wild MD;  Location: [facility] ENDOSCOPY;  Service:     BRONCHOSCOPY Bilateral 4/24/2018    Procedure: BRONCHOSCOPY WITH WASHING;  Surgeon: Katharina Salter MD;  Location[facility] END[facility]COPY;  Service: Pulmonary    BRONCHOSCOPY N/A 12/28/2019    Procedure: BRONCHOSCOPY with bilateral washing;  Surgeon: Katharina Salter MD;  Location: [facility] ENDOSCOPY;  Service: Pulmonary    BRONCHOSCOPY Bilateral 1/4/2023    Procedure: BRONCHOSCOPY with lavage;  Surgeon: Katharina Salter MD;  Location: [facility] ENDOSCOPY;  Service: Pulmonary;  Laterality: Bilateral;  mucus plugging    CARDIAC CATHETERIZATION N/A 1/29/2023    Procedure: Left Heart Cath;  Surgeon: Johnnie Ang MD;  Location: [facility] CATH INVASIVE LOCATION;  Service: Cardiology;  Laterality: N/A;    CARDIAC CATHETERIZATION N/A 1/29/2023    Procedure: Coronary angiography;  Surgeon: Johnnie Ang MD;  Location: [facility] CATH INVASIVE LOCATION;  Service: Cardiology;  Laterality: N/A;    COLONOSCOPY  05/17/2013    eh, ih, tort, sig tics    COLONOSCOPY N/A 5/10/2019    Non-thrombosed external hemorrhoids found on perianal exam, Diverticulosis, Tortuous colon, One 5 mm polyp in the mid ascending colon, IH. Path: Tubular adenoma.     COLONOSCOPY N/A 9/24/2022    Procedure: COLONOSCOPY to cecum and TI with argon plasma coagulation.;  Surgeon: Bruce Black MD;  Location: [facility] ENDOSCOPY;  Service: Gastroenterology;  Laterality: N/A;  pre- GI bleeding  post- radiation proctitis, diverticulosis    ENDOSCOPY  03/19/2015    z line irreg, hh    ENDOSCOPY N/A 9/24/2022    Procedure: ESOPHAGOGASTRODUODENOSCOPY;  Surgeon: Bruce Black MD;  Location: [facility] ENDOSCOPY;  Service: Gastroenterology;  Laterality: N/A;  pre- GI bleeding  post- esophagitis,  hiatal hernia    HIP OPEN REDUCTION Right 1/17/2018    Procedure: HIP OPEN REDUCTION INTERNAL FIXATION WITH DYNAMIC HIP SCREW;  Surgeon: Les Black MD;  Location: [facility] OR;  Service:     SPINE SURGERY      TONSILLECTOMY      TOTAL HIP ARTHROPLASTY REVISION Right 9/23/2019    Procedure: HIP  REVISION RIGHT;  Surgeon: Isauro Warner MD;  Location: [facility] OR;  Service: Orthopedics      General Information       Row Name 12/28/23 1512          Physical Therapy Time and Intention    Document Type therapy note (daily note)  -LW     Mode of Treatment physical therapy  first half cotx with OT, 2nd half individual  -LW       Row Name 12/28/23 1512          General Information    Patient Profile Reviewed yes  -LW     Existing Precautions/Restrictions fall  -LW       Row Name 12/28/23 1512          Cognition    Orientation Status (Cognition) oriented x 3  -LW       Row Name 12/28/23 1512          Safety Issues, Functional Mobility    Impairments Affecting Function (Mobility) endurance/activity tolerance;balance;strength;range of motion (ROM);pain  -LW               User Key  (r) = Recorded By, (t) = Taken By, (c) = Cosigned By      Initials Name Provider Type    LW Iesha Wilburn PT Physical Therapist                   Mobility       Row Name 12/28/23 1512          Bed Mobility    Comment, (Bed Mobility) NT, pt standing in room with OT on arrival  -LW       Row Name 12/28/23 1512          Gait/Stairs (Locomotion)    [place] Level (Gait) minimum assist (75% patient effort);verbal cues;nonverbal cues (demo/gesture);2 person assist  -LW     Assistive Device (Gait) walker, 4-wheeled  -LW     Distance in Feet (Gait) 5'  -LW     Deviations/Abnormal Patterns (Gait) norm decreased;gait speed decreased;stride length decreased;weight shifting decreased  -LW     Bilateral Gait Deviations forward flexed posture;heel strike decreased  -LW     [place] Level (Stairs) not tested  -LW     Comment, (Gait/Stairs)  very slow, pt holds front bar of rollator, unable to hold handles d/t shoulder pain  -LW               User Key  (r) = Recorded By, (t) = Taken By, (c) = Cosigned By      Initials Name Provider Type    Iesha Tabor PT Physical Therapist                   Obj/Interventions       Row Name 12/28/23 151          Motor Skills    Therapeutic Exercise other (see comments)  AP, LAQ, Seated marching x10  -LW       Row Name 12/28/23 1513          Balance    Static Standing Balance minimal assist;2-person assist  -LW     Dynamic Standing Balance minimal assist;2-person assist  -LW     Position/Device Used, Standing Balance supported;walker, 4-wheeled  -LW     Balance Interventions sitting;standing;sit to stand  -LW     Comment, Balance Alejandra to maintain standing balance while OT assisted with changing brief  -LW               User Key  (r) = Recorded By, (t) = Taken By, (c) = Cosigned By      Initials Name Provider Type    Iesha Tabor PT Physical Therapist                   Goals/Plan    No documentation.                  Clinical Impression       Row Name 12/28/23 1514          Pain    Pretreatment Pain Rating 6/10  -LW     Posttreatment Pain Rating 6/10  -LW     Pain Location - Side/Orientation Bilateral  -LW     Pain Location - shoulder  -LW     Pain Intervention(s) Rest;Repositioned;Ambulation/increased activity  -LW       Row Name 12/28/23 1510          Plan of Care Review    Plan of Care Reviewed With patient  -LW     Progress improving  -LW     Outcome Evaluation Pt was agreeable to working with PT. He was standing in the room wiht OT on arrival. He maintained standing balance with Alejandra and rollator while brief was changed. He ambulated 5' to the chair with minAx2 ad rollator. He completed seated ther ex. Pt fatigued quickly and c/o of mild cramping in his LEs following exercises. Session ended with pt UIC and all needs met within reach. Pt will benefit from continued skilled PT addresing limitations in  functional mobility to maximize safety and independence.  -       Row Name 12/28/23 1514          Positioning and Restraints    Pre-Treatment Position standing in room  -LW     Post Treatment Position chair  -LW     In Chair sitting;call light within reach;encouraged to call for assist;exit alarm on  -LW               User Key  (r) = Recorded By, (t) = Taken By, (c) = Cosigned By      Initials Name Provider Type    Iesha Tabor, STEFF Physical Therapist                   Outcome Measures       Row Name 12/28/23 1517 12/28/23 0828       How much help from another person do you currently need...    Turning from your back to your side while in flat bed without using bedrails? 3  -LW 3  -GK    Moving from lying on back to sitting on the side of a flat bed without bedrails? 2  -LW 2  -GK    Moving to and from a bed to a chair (including a wheelchair)? 2  -LW 2  -GK    Standing up from a chair using your arms (e.g., wheelchair, bedside chair)? 2  -LW 1  -GK    Climbing 3-5 steps with a railing? 1  -LW 1  -GK    To walk in hospital room? 2  -LW 2  -GK    AM-PAC 6 Clicks Score (PT) 12  -LW 11  -GK    Highest Level of Mobility Goal 4 --> Transfer to chair/commode  -LW 4 --> Transfer to chair/commode  -GK              User Key  (r) = Recorded By, (t) = Taken By, (c) = Cosigned By      Initials Name Provider Type     Sue Garza, RN Registered Nurse    Iesha Tabor, PT Physical Therapist                                 Physical Therapy Education       Title: PT OT SLP Therapies (In Progress)       Topic: Physical Therapy (In Progress)       Point: Mobility training (In Progress)       Learning Progress Summary             Patient Acceptance, E,D, NR by  at 12/28/2023 1518    Acceptance, E,TB, VU,DU,NR by  at 12/27/2023 1044                         Point: Home exercise program (In Progress)       Learning Progress Summary             Patient Acceptance, E,D, NR by  at 12/28/2023 1518                         Point:  Body mechanics (In Progress)       Learning Progress Summary             Patient Acceptance, E,D, NR by  at 12/28/2023 1518    Acceptance, E,TB, VU,DU,NR by  at 12/27/2023 1044                         Point: Precautions (In Progress)       Learning Progress Summary             Patient Acceptance, E,D, NR by  at 12/28/2023 1518    Acceptance, E,TB, VU,DU,NR by  at 12/27/2023 1044                                         User Key       Initials Effective Dates Name Provider Type Discipline     09/22/22 -  Destiny Santiago, PT Physical Therapist PT     05/08/23 -  Iesha Wilburn PT Physical Therapist PT                  PT Recommendation and Plan     Plan of Care Reviewed With: patient  Progress: improving  Outcome Evaluation: Pt was agreeable to working with PT. He was standing in the room wiht OT on arrival. He maintained standing balance with Alejandra and rollator while brief was changed. He ambulated 5' to the chair with minAx2 ad rollator. He completed seated ther ex. Pt fatigued quickly and c/o of mild cramping in his LEs following exercises. Session ended with pt UIC and all needs met within reach. Pt will benefit from continued skilled PT addresing limitations in functional mobility to maximize safety and independence.     Time Calculation:         PT Charges       Row Name 12/28/23 1518             Time Calculation    Start Time 1427  -LW      Stop Time 1440  -LW      Time Calculation (min) 13 min  -LW      PT Received On 12/28/23  -LW      PT - Next Appointment 12/29/23  -LW         Time Calculation- PT    Total Timed Code Minutes- PT 13 minute(s)  -LW         Timed Charges    78994 - PT Therapeutic Exercise Minutes 3  -LW      05020 - PT Therapeutic Activity Minutes 10  -LW         Total Minutes    Timed Charges Total Minutes 13  -LW       Total Minutes 13  -LW                User Key  (r) = Recorded By, (t) = Taken By, (c) = Cosigned By      Initials Name Provider Type    LW Iesha Wilburn, STEFF Physical  Therapist                  Therapy Charges for Today       Code Description Service Date Service Provider Modifiers Qty    [id number]  PT THERAPEUTIC ACT EA 15 MIN 12/28/2023 Iesha Wilburn, PT GP 1            PT G-Codes  Outcome Measure Options: AM-PAC 6 Clicks Basic Mobility (PT)  AM-PAC 6 Clicks Score (PT): 12  AM-PAC 6 Clicks Score (OT): 12  Modified [place] Scale: 4 - Moderately severe disability.  Unable to walk without assistance, and unable to attend to own bodily needs without assistance.       Iesha Wilburn, PT  12/28/2023

## 2023-12-29 LAB
ANION GAP SERPL CALCULATED.3IONS-SCNC: 7 MMOL/L (ref 5–15)
BASOPHILS # BLD AUTO: 0.03 10*3/MM3 (ref 0–0.2)
BASOPHILS NFR BLD AUTO: 0.9 % (ref 0–1.5)
BUN SERPL-MCNC: 13 MG/DL (ref 8–23)
BUN/CREAT SERPL: 15.1 (ref 7–25)
CALCIUM SPEC-SCNC: 8.2 MG/DL (ref 8.6–10.5)
CHLORIDE SERPL-SCNC: 109 MMOL/L (ref 98–107)
CO2 SERPL-SCNC: 25 MMOL/L (ref 22–29)
CREAT SERPL-MCNC: 0.86 MG/DL (ref 0.76–1.27)
CRP SERPL-MCNC: 1.8 MG/DL (ref 0–0.5)
DEPRECATED RDW RBC AUTO: 50.5 FL (ref 37–54)
EGFRCR SERPLBLD CKD-EPI 2021: 84.9 ML/MIN/1.73
EOSINOPHIL # BLD AUTO: 0.01 10*3/MM3 (ref 0–0.4)
EOSINOPHIL NFR BLD AUTO: 0.3 % (ref 0.3–6.2)
ERYTHROCYTE [DISTWIDTH] IN BLOOD BY AUTOMATED COUNT: 16.6 % (ref 12.3–15.4)
ERYTHROCYTE [SEDIMENTATION RATE] IN BLOOD: 3 MM/HR (ref 0–20)
GLUCOSE SERPL-MCNC: 94 MG/DL (ref 65–99)
HCT VFR BLD AUTO: 33.5 % (ref 37.5–51)
HGB BLD-MCNC: 10.7 G/DL (ref 13–17.7)
IMM GRANULOCYTES # BLD AUTO: 0.01 10*3/MM3 (ref 0–0.05)
IMM GRANULOCYTES NFR BLD AUTO: 0.3 % (ref 0–0.5)
LYMPHOCYTES # BLD AUTO: 1.65 10*3/MM3 (ref 0.7–3.1)
LYMPHOCYTES NFR BLD AUTO: 48.4 % (ref 19.6–45.3)
MCH RBC QN AUTO: 26.4 PG (ref 26.6–33)
MCHC RBC AUTO-ENTMCNC: 31.9 G/DL (ref 31.5–35.7)
MCV RBC AUTO: 82.7 FL (ref 79–97)
MONOCYTES # BLD AUTO: 0.41 10*3/MM3 (ref 0.1–0.9)
MONOCYTES NFR BLD AUTO: 12 % (ref 5–12)
NEUTROPHILS NFR BLD AUTO: 1.3 10*3/MM3 (ref 1.7–7)
NEUTROPHILS NFR BLD AUTO: 38.1 % (ref 42.7–76)
NRBC BLD AUTO-RTO: 0 /100 WBC (ref 0–0.2)
PLATELET # BLD AUTO: 196 10*3/MM3 (ref 140–450)
PMV BLD AUTO: 9.5 FL (ref 6–12)
POTASSIUM SERPL-SCNC: 4 MMOL/L (ref 3.5–5.2)
RBC # BLD AUTO: 4.05 10*6/MM3 (ref 4.14–5.8)
SODIUM SERPL-SCNC: 141 MMOL/L (ref 136–145)
WBC NRBC COR # BLD AUTO: 3.41 10*3/MM3 (ref 3.4–10.8)

## 2023-12-29 PROCEDURE — 80048 BASIC METABOLIC PNL TOTAL CA: CPT | Performed by: HOSPITALIST

## 2023-12-29 PROCEDURE — 85652 RBC SED RATE AUTOMATED: CPT | Performed by: HOSPITALIST

## 2023-12-29 PROCEDURE — 86140 C-REACTIVE PROTEIN: CPT | Performed by: HOSPITALIST

## 2023-12-29 PROCEDURE — 85025 COMPLETE CBC W/AUTO DIFF WBC: CPT | Performed by: HOSPITALIST

## 2023-12-29 RX ADMIN — HYDROCODONE BITARTRATE AND ACETAMINOPHEN 1 TABLET: 5; 325 TABLET ORAL at 08:19

## 2023-12-29 RX ADMIN — Medication 10 ML: at 23:17

## 2023-12-29 RX ADMIN — PANTOPRAZOLE SODIUM 40 MG: 40 TABLET, DELAYED RELEASE ORAL at 08:17

## 2023-12-29 RX ADMIN — GUAIFENESIN 600 MG: 600 TABLET, EXTENDED RELEASE ORAL at 20:45

## 2023-12-29 RX ADMIN — GABAPENTIN 300 MG: 300 CAPSULE ORAL at 17:13

## 2023-12-29 RX ADMIN — FLUOXETINE HYDROCHLORIDE 20 MG: 20 CAPSULE ORAL at 08:17

## 2023-12-29 RX ADMIN — GUAIFENESIN 600 MG: 600 TABLET, EXTENDED RELEASE ORAL at 08:17

## 2023-12-29 RX ADMIN — GABAPENTIN 300 MG: 300 CAPSULE ORAL at 20:45

## 2023-12-29 RX ADMIN — GABAPENTIN 300 MG: 300 CAPSULE ORAL at 08:17

## 2023-12-29 RX ADMIN — Medication 10 ML: at 08:17

## 2023-12-29 NOTE — PLAN OF CARE
Goal Outcome Evaluation:               Patient is alert and oriented x 4, vitals stable, room air, up with assist x 1, no complaints this shift. IV saline locked, safety precautions in use, plan of care ongoing.

## 2023-12-29 NOTE — CASE MANAGEMENT/SOCIAL WORK
Continued Stay Note  [facility]     Patient Name: Tony Leonard  MRN: [id number]  Today's Date: 12/29/2023    Admit Date: 12/26/2023    Plan: [facility] after 12/30   Discharge Plan       Row Name 12/29/23 1440       Plan    Plan [facility] after 12/30    Patient/Family in Agreement with Plan yes    Plan Comments Spoke with patient at bedside. Introduced self and explained that [facility] can accept starting tomorrow. Patient voiced understanding and is agreeable to choice. Packet placed in patient's cubby. Pharmacy is updated. Patient unsure if family can transport at this time.                   Discharge Codes    No documentation.                 Expected Discharge Date and Time       Expected Discharge Date Expected Discharge Time    Dec 30, 2023               Gwen Cruz RN

## 2023-12-29 NOTE — NURSING NOTE
Wound/Ostomy: We see a patient at the request of the floor nurse regarding skin issue on Rt Hip. He is sitting in the chair with his feet elevated, alert, oriented, able to show me the lesion he has, upon assessment we could see blanchable redness and not intact blister, draining serous clear content, in the rt Hip over the old surgery, according to the patient d/t the  fall/friction he had in the bathroom when he was walking with [place] three days ago. Opticel dressing applied, covered with Optifoam.  Sacrococcygeal/Buttocks area and bilateral Heel are with normal skin, no redness.  Wound care order and nursing intervention have been implemented into Epic.  Please re-consult for any additional needs.

## 2023-12-30 VITALS
TEMPERATURE: 97.5 F | RESPIRATION RATE: 18 BRPM | HEART RATE: 71 BPM | HEIGHT: 71 IN | BODY MASS INDEX: 23.1 KG/M2 | WEIGHT: 165 LBS | OXYGEN SATURATION: 100 % | SYSTOLIC BLOOD PRESSURE: 129 MMHG | DIASTOLIC BLOOD PRESSURE: 55 MMHG

## 2023-12-30 LAB
ANION GAP SERPL CALCULATED.3IONS-SCNC: 9.4 MMOL/L (ref 5–15)
BASOPHILS # BLD AUTO: 0.02 10*3/MM3 (ref 0–0.2)
BASOPHILS NFR BLD AUTO: 0.6 % (ref 0–1.5)
BUN SERPL-MCNC: 17 MG/DL (ref 8–23)
BUN/CREAT SERPL: 18.5 (ref 7–25)
CALCIUM SPEC-SCNC: 8.5 MG/DL (ref 8.6–10.5)
CHLORIDE SERPL-SCNC: 104 MMOL/L (ref 98–107)
CO2 SERPL-SCNC: 25.6 MMOL/L (ref 22–29)
CREAT SERPL-MCNC: 0.92 MG/DL (ref 0.76–1.27)
DEPRECATED RDW RBC AUTO: 48.6 FL (ref 37–54)
EGFRCR SERPLBLD CKD-EPI 2021: 81.5 ML/MIN/1.73
EOSINOPHIL # BLD AUTO: 0.04 10*3/MM3 (ref 0–0.4)
EOSINOPHIL NFR BLD AUTO: 1.1 % (ref 0.3–6.2)
ERYTHROCYTE [DISTWIDTH] IN BLOOD BY AUTOMATED COUNT: 16.5 % (ref 12.3–15.4)
GLUCOSE SERPL-MCNC: 106 MG/DL (ref 65–99)
HCT VFR BLD AUTO: 31.4 % (ref 37.5–51)
HGB BLD-MCNC: 10.2 G/DL (ref 13–17.7)
IMM GRANULOCYTES # BLD AUTO: 0 10*3/MM3 (ref 0–0.05)
IMM GRANULOCYTES NFR BLD AUTO: 0 % (ref 0–0.5)
LYMPHOCYTES # BLD AUTO: 1.44 10*3/MM3 (ref 0.7–3.1)
LYMPHOCYTES NFR BLD AUTO: 39.9 % (ref 19.6–45.3)
MCH RBC QN AUTO: 26.6 PG (ref 26.6–33)
MCHC RBC AUTO-ENTMCNC: 32.5 G/DL (ref 31.5–35.7)
MCV RBC AUTO: 81.8 FL (ref 79–97)
MONOCYTES # BLD AUTO: 0.4 10*3/MM3 (ref 0.1–0.9)
MONOCYTES NFR BLD AUTO: 11.1 % (ref 5–12)
NEUTROPHILS NFR BLD AUTO: 1.71 10*3/MM3 (ref 1.7–7)
NEUTROPHILS NFR BLD AUTO: 47.3 % (ref 42.7–76)
NRBC BLD AUTO-RTO: 0 /100 WBC (ref 0–0.2)
PLATELET # BLD AUTO: 208 10*3/MM3 (ref 140–450)
PMV BLD AUTO: 9.9 FL (ref 6–12)
POTASSIUM SERPL-SCNC: 4.1 MMOL/L (ref 3.5–5.2)
RBC # BLD AUTO: 3.84 10*6/MM3 (ref 4.14–5.8)
SODIUM SERPL-SCNC: 139 MMOL/L (ref 136–145)
WBC NRBC COR # BLD AUTO: 3.61 10*3/MM3 (ref 3.4–10.8)

## 2023-12-30 PROCEDURE — 94799 UNLISTED PULMONARY SVC/PX: CPT

## 2023-12-30 PROCEDURE — 85025 COMPLETE CBC W/AUTO DIFF WBC: CPT | Performed by: HOSPITALIST

## 2023-12-30 PROCEDURE — 94664 DEMO&/EVAL PT USE INHALER: CPT

## 2023-12-30 PROCEDURE — 80048 BASIC METABOLIC PNL TOTAL CA: CPT | Performed by: HOSPITALIST

## 2023-12-30 RX ORDER — ONDANSETRON 4 MG/1
4 TABLET, ORALLY DISINTEGRATING ORAL EVERY 6 HOURS PRN
Start: 2023-12-30

## 2023-12-30 RX ORDER — GUAIFENESIN/DEXTROMETHORPHAN 100-10MG/5
5 SYRUP ORAL EVERY 4 HOURS PRN
Status: DISCONTINUED | OUTPATIENT
Start: 2023-12-30 | End: 2023-12-30 | Stop reason: HOSPADM

## 2023-12-30 RX ORDER — GABAPENTIN 300 MG/1
300 CAPSULE ORAL 3 TIMES DAILY
Qty: 20 CAPSULE | Refills: 0 | Status: SHIPPED | OUTPATIENT
Start: 2023-12-30

## 2023-12-30 RX ORDER — ACETAMINOPHEN 325 MG/1
650 TABLET ORAL EVERY 4 HOURS PRN
Start: 2023-12-30

## 2023-12-30 RX ORDER — GUAIFENESIN/DEXTROMETHORPHAN 100-10MG/5
5 SYRUP ORAL EVERY 4 HOURS PRN
Start: 2023-12-30

## 2023-12-30 RX ORDER — GUAIFENESIN 600 MG/1
600 TABLET, EXTENDED RELEASE ORAL EVERY 12 HOURS SCHEDULED
Start: 2023-12-30

## 2023-12-30 RX ORDER — HYDROCODONE BITARTRATE AND ACETAMINOPHEN 5; 325 MG/1; MG/1
1 TABLET ORAL EVERY 6 HOURS PRN
Qty: 10 TABLET | Refills: 0 | Status: SHIPPED | OUTPATIENT
Start: 2023-12-30 | End: 2024-01-04

## 2023-12-30 RX ADMIN — Medication 10 ML: at 09:30

## 2023-12-30 RX ADMIN — PANTOPRAZOLE SODIUM 40 MG: 40 TABLET, DELAYED RELEASE ORAL at 09:30

## 2023-12-30 RX ADMIN — FLUOXETINE HYDROCHLORIDE 20 MG: 20 CAPSULE ORAL at 09:30

## 2023-12-30 RX ADMIN — ALBUTEROL SULFATE 2.5 MG: 2.5 SOLUTION RESPIRATORY (INHALATION) at 10:52

## 2023-12-30 RX ADMIN — GABAPENTIN 300 MG: 300 CAPSULE ORAL at 09:29

## 2023-12-30 RX ADMIN — GUAIFENESIN 600 MG: 600 TABLET, EXTENDED RELEASE ORAL at 09:30

## 2023-12-30 NOTE — NURSING NOTE
1612 RN called [facility] and gave report to Paz TAVARES. Pt's son Stef Leonard will be driving pt to [facility] at 1700.

## 2023-12-30 NOTE — PLAN OF CARE
Goal Outcome Evaluation:         Patient is alert and oriented x 4, vitals stable, room air, up with standby assist, no complaints this shift, wound dressing changed, safety precautions in use, plan of care ongoing. Plan is to go to [facility] maybe tomorrow.  Safety precautions in use, plan of care ongoing.

## 2023-12-30 NOTE — DISCHARGE SUMMARY
PHYSICIAN DISCHARGE SUMMARY                                                                        [facility]    Patient Identification:  Name: Tony Leonard  Age: 83 y.o.  Sex: male  :  1940  MRN: [id number]  Primary Care Physician: Harpreet Kate MD    Admit date: 2023  Discharge date and time:2023  Discharged Condition: good    Discharge Diagnoses:  Active Hospital Problems    Diagnosis  POA    **Rhabdomyolysis [M62.82]  Yes    Multiple contusions [T07.XXXA]  Unknown    Fall [W19.XXXA]  Unknown    Spinal stenosis, lumbar region with neurogenic claudication [M48.062]  Yes    HTN (hypertension) [I10]  Yes    Prostate cancer [C61]  Yes    Spondylolisthesis of lumbar region [M43.16]  Yes    Chronic diastolic CHF (congestive heart failure) [I50.32]  Yes    Dyslipidemia [E78.5]  Yes    Gastroesophageal reflux disease [K21.9]  Yes      Resolved Hospital Problems   No resolved problems to display.          PMHX:   Past Medical History:   Diagnosis Date    ADHD (attention deficit hyperactivity disorder)     Bronchiectasis     CHF (congestive heart failure)     Colon polyp     COPD (chronic obstructive pulmonary disease)     Diverticulosis     Hard of hearing     On home oxygen therapy     2 LITERS    Pneumonia     Prostate cancer 2019    Spinal stenosis, lumbar region with neurogenic claudication 2022     PSHX:   Past Surgical History:   Procedure Laterality Date    BRONCHOSCOPY N/A 2016    Procedure: BRONCHOSCOPY with BAL of right lower lobe and left  lower lobe.  ;  Surgeon: Nando Diaz MD;  Location: [facility] ENDOSCOPY;  Service:     BRONCHOSCOPY N/A 2017    Procedure: BRONCHOSCOPY;  Surgeon: Katharina Salter MD;  Location: [facility] ENDOSCOPY;  Service:     BRONCHOSCOPY Bilateral 2017    Procedure: BRONCHOSCOPY WITH WASHINGS;  Surgeon: Katharina Salter MD;  Location: [facility] ENDOSCOPY;   Service:     BRONCHOSCOPY N/A 1/22/2018    Procedure: BRONCHOSCOPY AT BEDSIDE with BAL;  Surgeon: Katharina Salter MD;  Location: [facility] ENDOSCOPY;  Service:     BRONCHOSCOPY N/A 1/30/2018    Procedure: BRONCHOSCOPY with BAL and washing;  Surgeon: Shreyas Wild MD;  Location: [facility] ENDOSCOPY;  Service:     BRONCHOSCOPY Bilateral 4/24/2018    Procedure: BRONCHOSCOPY WITH WASHING;  Surgeon: Katharina Salter MD;  Location: [facility] ENDOSCOPY;  Service: Pulmonary    BRONCHOSCOPY N/A 12/28/2019    Procedure: BRONCHOSCOPY with bilateral washing;  Surgeon: Katharina Salter MD;  Location: [facility] ENDOSCOPY;  Service: Pulmonary    BRONCHOSCOPY Bilateral 1/4/2023    Procedure: BRONCHOSCOPY with lavage;  Surgeon: Katharina Salter MD;  Location: [facility] ENDOSCOPY;  Service: Pulmonary;  Laterality: Bilateral;  mucus plugging    CARDIAC CATHETERIZATION N/A 1/29/2023    Procedure: Left Heart Cath;  Surgeon: Johnnie Ang MD;  Location: [facility] CATH INVASIVE LOCATION;  Service: Cardiology;  Laterality: N/A;    CARDIAC CATHETERIZATION N/A 1/29/2023    Procedure: Coronary angiography;  Surgeon: Johnnie Ang MD;  Location: [facility] CATH INVASIVE LOCATION;  Service: Cardiology;  Laterality: N/A;    COLONOSCOPY  05/17/2013    eh, ih, tort, sig tics    COLONOSCOPY N/A 5/10/2019    Non-thrombosed external hemorrhoids found on perianal exam, Diverticulosis, Tortuous colon, One 5 mm polyp in the mid ascending colon, IH. Path: Tubular adenoma.     COLONOSCOPY N/A 9/24/2022    Procedure: COLONOSCOPY to cecum and TI with argon plasma coagulation.;  Surgeon: Bruce Black MD;  Location: [facility] ENDOSCOPY;  Service: Gastroenterology;  Laterality: N/A;  pre- GI bleeding  post- radiation proctitis, diverticulosis    ENDOSCOPY  03/19/2015    z line irreg, hh    ENDOSCOPY N/A 9/24/2022    Procedure: ESOPHAGOGASTRODUODENOSCOPY;  Surgeon: Bruce Black MD;  Location: [facility] ENDOSCOPY;  Service: Gastroenterology;  Laterality: N/A;  pre- GI  bleeding  post- esophagitis, hiatal hernia    HIP OPEN REDUCTION Right 1/17/2018    Procedure: HIP OPEN REDUCTION INTERNAL FIXATION WITH DYNAMIC HIP SCREW;  Surgeon: Les Black MD;  Location: [facility];  Service:     SPINE SURGERY      TONSILLECTOMY      TOTAL HIP ARTHROPLASTY REVISION Right 9/23/2019    Procedure: HIP  REVISION RIGHT;  Surgeon: Isauro Warner MD;  Location: [facility];  Service: Orthopedics       Hospital Course: Tony Leonard   is a 83 y.o. male who presents to the hospital from home via EMS complaining of a mechanical fall that occurred around 12 AM on the day of admission.  Patient was walking into the bathroom with his walker when he slipped and fell.  He landed on his right hip.  He is unsure if he hit his head but he denies loss of consciousness.  He was unable to get off the floor and laid there for several hours.  He also complains of pain in his neck, right wrist, right thigh, and right lower leg.  Denies chest pain, shortness of breath, abdominal pain, or numbness/tingling in his extremities.  He has had a right hip dislocation.  He was recently taken off of Eliquis.  He was seen in the ER and had multiple x-rays done which did not show any definite fractures.  His CPK was elevated and was felt to have some degree of rhabdo from muscular injury.  The patient was started on some IV fluids and admitted for further evaluation treatment.  The patient was admitted to the hospital and given some IV fluid for hydration.  He was seen by physical therapy and was very weak.  After being in the hospital for few days he felt well enough to go to a skilled nursing facility for rehab.  He will follow-up with his primary care after released from rehab.    Consults:     Consults       Date and Time Order Name Status Description    12/26/2023  8:39 AM [facility] (on-call MD unless specified) Details      12/22/2023  3:12 AM Inpatient Cardiology Consult Completed           Results from last 7  "days   Lab Units 12/30/23  0459   WBC 10*3/mm3 3.61   HEMOGLOBIN g/dL 10.2*   HEMATOCRIT % 31.4*   PLATELETS 10*3/mm3 208     Results from last 7 days   Lab Units 12/30/23  0459   SODIUM mmol/L 139   POTASSIUM mmol/L 4.1   CHLORIDE mmol/L 104   CO2 mmol/L 25.6   BUN mg/dL 17   CREATININE mg/dL 0.92   GLUCOSE mg/dL 106*   CALCIUM mg/dL 8.5*     Significant Diagnostic Studies:   WBC   Date Value Ref Range Status   12/30/2023 3.61 3.40 - 10.80 10*3/mm3 Final     Hemoglobin   Date Value Ref Range Status   12/30/2023 10.2 (L) 13.0 - 17.7 g/dL Final     Hematocrit   Date Value Ref Range Status   12/30/2023 31.4 (L) 37.5 - 51.0 % Final     Platelets   Date Value Ref Range Status   12/30/2023 208 140 - 450 10*3/mm3 Final     Sodium   Date Value Ref Range Status   12/30/2023 139 136 - 145 mmol/L Final     Potassium   Date Value Ref Range Status   12/30/2023 4.1 3.5 - 5.2 mmol/L Final     Chloride   Date Value Ref Range Status   12/30/2023 104 98 - 107 mmol/L Final     CO2   Date Value Ref Range Status   12/30/2023 25.6 22.0 - 29.0 mmol/L Final     BUN   Date Value Ref Range Status   12/30/2023 17 8 - 23 mg/dL Final     Creatinine   Date Value Ref Range Status   12/30/2023 0.92 0.76 - 1.27 mg/dL Final     Glucose   Date Value Ref Range Status   12/30/2023 106 (H) 65 - 99 mg/dL Final     Calcium   Date Value Ref Range Status   12/30/2023 8.5 (L) 8.6 - 10.5 mg/dL Final     No results found for: \"AST\", \"ALT\", \"ALKPHOS\"  No results found for: \"APTT\", \"INR\"  No results found for: \"COLORU\", \"CLARITYU\", \"SPECGRAV\", \"PHUR\", \"PROTEINUR\", \"GLUCOSEU\", \"KETONESU\", \"BLOODU\", \"NITRITE\", \"LEUKOCYTESUR\", \"BILIRUBINUR\", \"UROBILINOGEN\", \"RBCUA\", \"WBCUA\", \"BACTERIA\", \"UACOMMENT\"  No results found for: \"TROPONINT\", \"TROPONINI\", \"BNP\"  No components found for: \"HGBA1C;2\"  No components found for: \"TSH;2\"  Imaging Results (All)       Procedure Component Value Units Date/Time    XR Shoulder 1 View Right [[id number]] Collected: 12/27/23 1434     " Updated: 12/27/23 1442    Narrative:      PROCEDURE:  XR SHOULDER 1 VW RIGHT-     HISTORY: Rhabdomyolysis, multiple injuries, fall, right shoulder pain.     COMPARISON: Chest radiograph 12/22/2023     FINDINGS:       2 views of the right shoulder were obtained.     There is diffuse osseous demineralization, which limits evaluation of  fine osseous detail. No acute fracture or malalignment is identified.   Joint spaces are preserved.              This report was finalized on 12/27/2023 2:38 PM by Dr. Michelle Javier M.D  on Workstation: [id number]       CT Head Without Contrast [[id number]] Collected: 12/26/23 0755     Updated: 12/26/23 1351    Narrative:      CT HEAD AND CERVICAL SPINE WITHOUT CONTRAST     HISTORY: Fall.     COMPARISON: CT head and cervical spine 11/25/2022.     CT HEAD WITHOUT CONTRAST:     The brain and ventricles are symmetrical. There is no evidence of  hemorrhage, hydrocephalus or of abnormal extra-axial fluid. No focal  area of decreased attenuation to suggest acute infarction is identified.  Bone windows showed no evidence of a calvarial fracture. Bilateral  medial maxillary osteotomies as well as bilateral partial  ethmoidectomies are noted. Mild mucosal thickening involving the ethmoid  air cells is noted bilaterally and involving the right maxillary sinus.       Impression:      There is no evidence of fracture or of intracranial  hemorrhage.     CT EXAMINATION OF THE CERVICAL SPINE WITHOUT CONTRAST:     The patient has undergone anterior cervical fusion from C3 to C7 with  anterior plate, screws and interbody graft material. Lucency related to  the screws at C7 is appreciated consistent with loosening, present  previously. There is fusion of the facets to the left at C4-5. There is  no evidence of prevertebral edema or of fracture.      C2-3: A small central disc osteophyte complex is present.     C3-4: Mild foraminal stenosis is present bilaterally, more prominent on  the left, secondary to  extension of osteophyte into the neural foramen.     C4-5: There is no evidence of disc bulge or herniation.     C5-6: A mild central broad-based disc osteophyte complex is present with  no evidence of herniation. Moderate foraminal stenosis is present on the  right and there is mild-to-moderate foraminal stenosis on the left  secondary to extension of osteophyte into the neural foramen.     C6-7: A central broad-based disc osteophyte complex is present which  results in mild-to-moderate canal stenosis, slightly more prominent to  the right. It contributes to moderate foraminal stenosis bilaterally,  more prominent on the left.     C7-T1: There is no evidence of disc bulge or herniation.     IMPRESSION: There is no evidence of fracture. Cervical fusion from C3 to  C7 is noted. The screws at C7 are loose. This was the case on  11/25/2022. Clinical correlation is suggested. Multilevel degenerative  disease is noted including multilevel foraminal stenosis secondary to  extension of osteophyte into the neural foramen most prominent on the  right at C5-6 and on the left at C6-7.     Radiation dose reduction techniques were utilized, including automated  exposure control and exposure modulation based on body size.        This report was finalized on 12/26/2023 1:48 PM by Dr. Les Finch M.D on Workstation: [id number]       CT Cervical Spine Without Contrast [[id number]] Collected: 12/26/23 0755     Updated: 12/26/23 1351    Narrative:      CT HEAD AND CERVICAL SPINE WITHOUT CONTRAST     HISTORY: Fall.     COMPARISON: CT head and cervical spine 11/25/2022.     CT HEAD WITHOUT CONTRAST:     The brain and ventricles are symmetrical. There is no evidence of  hemorrhage, hydrocephalus or of abnormal extra-axial fluid. No focal  area of decreased attenuation to suggest acute infarction is identified.  Bone windows showed no evidence of a calvarial fracture. Bilateral  medial maxillary osteotomies as well as bilateral  partial  ethmoidectomies are noted. Mild mucosal thickening involving the ethmoid  air cells is noted bilaterally and involving the right maxillary sinus.       Impression:      There is no evidence of fracture or of intracranial  hemorrhage.     CT EXAMINATION OF THE CERVICAL SPINE WITHOUT CONTRAST:     The patient has undergone anterior cervical fusion from C3 to C7 with  anterior plate, screws and interbody graft material. Lucency related to  the screws at C7 is appreciated consistent with loosening, present  previously. There is fusion of the facets to the left at C4-5. There is  no evidence of prevertebral edema or of fracture.      C2-3: A small central disc osteophyte complex is present.     C3-4: Mild foraminal stenosis is present bilaterally, more prominent on  the left, secondary to extension of osteophyte into the neural foramen.     C4-5: There is no evidence of disc bulge or herniation.     C5-6: A mild central broad-based disc osteophyte complex is present with  no evidence of herniation. Moderate foraminal stenosis is present on the  right and there is mild-to-moderate foraminal stenosis on the left  secondary to extension of osteophyte into the neural foramen.     C6-7: A central broad-based disc osteophyte complex is present which  results in mild-to-moderate canal stenosis, slightly more prominent to  the right. It contributes to moderate foraminal stenosis bilaterally,  more prominent on the left.     C7-T1: There is no evidence of disc bulge or herniation.     IMPRESSION: There is no evidence of fracture. Cervical fusion from C3 to  C7 is noted. The screws at C7 are loose. This was the case on  11/25/2022. Clinical correlation is suggested. Multilevel degenerative  disease is noted including multilevel foraminal stenosis secondary to  extension of osteophyte into the neural foramen most prominent on the  right at C5-6 and on the left at C6-7.     Radiation dose reduction techniques were utilized,  including automated  exposure control and exposure modulation based on body size.        This report was finalized on 12/26/2023 1:48 PM by Dr. Les Finch M.D on Workstation: [id number]       XR Tibia Fibula 2 View Right [[id number]] Collected: 12/26/23 0746     Updated: 12/26/23 0752    Narrative:      XR ANKLE 3+ VW RIGHT-, XR TIBIA FIBULA 2 VW RIGHT-     INDICATION: Right lower leg injury post fall     COMPARISON: 12/26/2023       Impression:      No fracture. Ankle mortise is maintained on nonweightbearing  views.     This report was finalized on 12/26/2023 7:48 AM by Dr. Juan Harrington M.D on Workstation: [id number]       XR Ankle 3+ View Right [[id number]] Collected: 12/26/23 0746     Updated: 12/26/23 0752    Narrative:      XR ANKLE 3+ VW RIGHT-, XR TIBIA FIBULA 2 VW RIGHT-     INDICATION: Right lower leg injury post fall     COMPARISON: 12/26/2023       Impression:      No fracture. Ankle mortise is maintained on nonweightbearing  views.     This report was finalized on 12/26/2023 7:48 AM by Dr. Juan Harrington M.D on Workstation: [id number]       XR Wrist 3+ View Right [[id number]] Collected: 12/26/23 0743     Updated: 12/26/23 0749    Narrative:      XR WRIST 3+ VW RIGHT-     INDICATION: Wrist pain post fall     COMPARISON: None available       Impression:      No fracture. Normal alignment. 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 Chondrocalcinosis most pronounced at the  triangular fibrocartilage.     This report was finalized on 12/26/2023 7:46 AM by Dr. Juan Harrington M.D on Workstation: [id number]       XR Hip With or Without Pelvis 2 - 3 View Right [[id number]] Collected: 12/26/23 0702     Updated: 12/26/23 0710    Narrative:      XR HIP W OR WO PELVIS 2-3 VIEW RIGHT-, XR FEMUR 2 VW RIGHT-     INDICATION: Fall onto right hip     COMPARISON: 1/25/2022 and 10/18/2023       Impression:      Right total hip arthroplasty. Components are well-seated  without evidence of loosening or  periprosthetic fracture. No more distal  right femur fracture. Prior left superior and inferior pubic rami  fractures.      This report was finalized on 12/26/2023 7:07 AM by Dr. Juan Harrington M.D on Workstation: [id number]       XR Femur 2 View Right [[id number]] Collected: 12/26/23 0702     Updated: 12/26/23 0710    Narrative:      XR HIP W OR WO PELVIS 2-3 VIEW RIGHT-, XR FEMUR 2 VW RIGHT-     INDICATION: Fall onto right hip     COMPARISON: 1/25/2022 and 10/18/2023       Impression:      Right total hip arthroplasty. Components are well-seated  without evidence of loosening or periprosthetic fracture. No more distal  right femur fracture. Prior left superior and inferior pubic rami  fractures.      This report was finalized on 12/26/2023 7:07 AM by Dr. Juan Harrington M.D on Workstation: [id number]             Lab Results (last 7 days)       Procedure Component Value Units Date/Time    Basic Metabolic Panel [[id number]]  (Abnormal) Collected: 12/30/23 0459    Specimen: Blood Updated: 12/30/23 0616     Glucose 106 mg/dL      BUN 17 mg/dL      Creatinine 0.92 mg/dL      Sodium 139 mmol/L      Potassium 4.1 mmol/L      Chloride 104 mmol/L      CO2 25.6 mmol/L      Calcium 8.5 mg/dL      BUN/Creatinine Ratio 18.5     Anion Gap 9.4 mmol/L      eGFR 81.5 mL/min/1.73     Narrative:      GFR Normal >60  Chronic Kidney Disease <60  Kidney Failure <15    The GFR formula is only valid for adults with stable renal function between ages 18 and 70.    CBC & Differential [[id number]]  (Abnormal) Collected: 12/30/23 0459    Specimen: Blood Updated: 12/30/23 0557    Narrative:      The following orders were created for panel order CBC & Differential.  Procedure                               Abnormality         Status                     ---------                               -----------         ------                     CBC Auto Differential[[id number]]        Abnormal            Final result                 Please view results  for these tests on the individual orders.    CBC Auto Differential [[id number]]  (Abnormal) Collected: 12/30/23 0459    Specimen: Blood Updated: 12/30/23 0557     WBC 3.61 10*3/mm3      RBC 3.84 10*6/mm3      Hemoglobin 10.2 g/dL      Hematocrit 31.4 %      MCV 81.8 fL      MCH 26.6 pg      MCHC 32.5 g/dL      RDW 16.5 %      RDW-SD 48.6 fl      MPV 9.9 fL      Platelets 208 10*3/mm3      Neutrophil % 47.3 %      Lymphocyte % 39.9 %      Monocyte % 11.1 %      Eosinophil % 1.1 %      Basophil % 0.6 %      Immature Grans % 0.0 %      Neutrophils, Absolute 1.71 10*3/mm3      Lymphocytes, Absolute 1.44 10*3/mm3      Monocytes, Absolute 0.40 10*3/mm3      Eosinophils, Absolute 0.04 10*3/mm3      Basophils, Absolute 0.02 10*3/mm3      Immature Grans, Absolute 0.00 10*3/mm3      nRBC 0.0 /100 WBC     Basic Metabolic Panel [[id number]]  (Abnormal) Collected: 12/29/23 0728    Specimen: Blood Updated: 12/29/23 0830     Glucose 94 mg/dL      BUN 13 mg/dL      Creatinine 0.86 mg/dL      Sodium 141 mmol/L      Potassium 4.0 mmol/L      Chloride 109 mmol/L      CO2 25.0 mmol/L      Calcium 8.2 mg/dL      BUN/Creatinine Ratio 15.1     Anion Gap 7.0 mmol/L      eGFR 84.9 mL/min/1.73     Narrative:      GFR Normal >60  Chronic Kidney Disease <60  Kidney Failure <15    The GFR formula is only valid for adults with stable renal function between ages 18 and 70.    C-reactive Protein [[id number]]  (Abnormal) Collected: 12/29/23 0728    Specimen: Blood Updated: 12/29/23 0830     C-Reactive Protein 1.80 mg/dL     Sedimentation Rate [[id number]]  (Normal) Collected: 12/29/23 0728    Specimen: Blood Updated: 12/29/23 0821     Sed Rate 3 mm/hr     CBC & Differential [[id number]]  (Abnormal) Collected: 12/29/23 0728    Specimen: Blood Updated: 12/29/23 0756    Narrative:      The following orders were created for panel order CBC & Differential.  Procedure                               Abnormality         Status                     ---------                                -----------         ------                     CBC Auto Differential[[id number]]        Abnormal            Final result                 Please view results for these tests on the individual orders.    CBC Auto Differential [[id number]]  (Abnormal) Collected: 12/29/23 0728    Specimen: Blood Updated: 12/29/23 0756     WBC 3.41 10*3/mm3      RBC 4.05 10*6/mm3      Hemoglobin 10.7 g/dL      Hematocrit 33.5 %      MCV 82.7 fL      MCH 26.4 pg      MCHC 31.9 g/dL      RDW 16.6 %      RDW-SD 50.5 fl      MPV 9.5 fL      Platelets 196 10*3/mm3      Neutrophil % 38.1 %      Lymphocyte % 48.4 %      Monocyte % 12.0 %      Eosinophil % 0.3 %      Basophil % 0.9 %      Immature Grans % 0.3 %      Neutrophils, Absolute 1.30 10*3/mm3      Lymphocytes, Absolute 1.65 10*3/mm3      Monocytes, Absolute 0.41 10*3/mm3      Eosinophils, Absolute 0.01 10*3/mm3      Basophils, Absolute 0.03 10*3/mm3      Immature Grans, Absolute 0.01 10*3/mm3      nRBC 0.0 /100 WBC     Basic Metabolic Panel [[id number]]  (Abnormal) Collected: 12/28/23 0733    Specimen: Blood Updated: 12/28/23 0814     Glucose 97 mg/dL      BUN 16 mg/dL      Creatinine 0.99 mg/dL      Sodium 139 mmol/L      Potassium 3.9 mmol/L      Chloride 111 mmol/L      CO2 21.2 mmol/L      Calcium 7.8 mg/dL      BUN/Creatinine Ratio 16.2     Anion Gap 6.8 mmol/L      eGFR 74.7 mL/min/1.73     Narrative:      GFR Normal >60  Chronic Kidney Disease <60  Kidney Failure <15    The GFR formula is only valid for adults with stable renal function between ages 18 and 70.    CK [[id number]]  (Abnormal) Collected: 12/28/23 0733    Specimen: Blood Updated: 12/28/23 0814     Creatine Kinase 613 U/L     CBC & Differential [[id number]]  (Abnormal) Collected: 12/28/23 0733    Specimen: Blood Updated: 12/28/23 0812    Narrative:      The following orders were created for panel order CBC & Differential.  Procedure                               Abnormality         Status                      ---------                               -----------         ------                     CBC Auto Differential[[id number]]        Abnormal            Final result                 Please view results for these tests on the individual orders.    CBC Auto Differential [[id number]]  (Abnormal) Collected: 12/28/23 0733    Specimen: Blood Updated: 12/28/23 0812     WBC 3.44 10*3/mm3      RBC 3.77 10*6/mm3      Hemoglobin 9.9 g/dL      Hematocrit 30.4 %      MCV 80.6 fL      MCH 26.3 pg      MCHC 32.6 g/dL      RDW 16.5 %      RDW-SD 47.9 fl      MPV 9.9 fL      Platelets 177 10*3/mm3      Neutrophil % 36.0 %      Lymphocyte % 48.0 %      Monocyte % 14.8 %      Eosinophil % 0.3 %      Basophil % 0.9 %      Immature Grans % 0.0 %      Neutrophils, Absolute 1.24 10*3/mm3      Lymphocytes, Absolute 1.65 10*3/mm3      Monocytes, Absolute 0.51 10*3/mm3      Eosinophils, Absolute 0.01 10*3/mm3      Basophils, Absolute 0.03 10*3/mm3      Immature Grans, Absolute 0.00 10*3/mm3      nRBC 0.0 /100 WBC     Manual Differential [[id number]]  (Abnormal) Collected: 12/27/23 0620    Specimen: Blood Updated: 12/27/23 0736     Neutrophil % 64.6 %      Lymphocyte % 18.2 %      Monocyte % 15.2 %      Basophil % 2.0 %      Neutrophils Absolute 2.87 10*3/mm3      Lymphocytes Absolute 0.81 10*3/mm3      Monocytes Absolute 0.67 10*3/mm3      Basophils Absolute 0.09 10*3/mm3      Amy Cells Slight/1+     Ovalocytes Slight/1+     Poikilocytes Mod/2+     RBC Fragments Slight/1+     Smudge Cells Mod/2+     Platelet Morphology Normal    CBC Auto Differential [[id number]]  (Abnormal) Collected: 12/27/23 0620    Specimen: Blood Updated: 12/27/23 0736     WBC 4.44 10*3/mm3      RBC 4.07 10*6/mm3      Hemoglobin 10.9 g/dL      Hematocrit 34.0 %      MCV 83.5 fL      MCH 26.8 pg      MCHC 32.1 g/dL      RDW 16.5 %      RDW-SD 50.3 fl      MPV 9.7 fL      Platelets 189 10*3/mm3     Basic Metabolic Panel [[id number]]  (Abnormal) Collected: 12/27/23 0620     Specimen: Blood Updated: 12/27/23 0702     Glucose 90 mg/dL      BUN 13 mg/dL      Creatinine 0.89 mg/dL      Sodium 138 mmol/L      Potassium 3.9 mmol/L      Chloride 108 mmol/L      CO2 21.0 mmol/L      Calcium 7.8 mg/dL      BUN/Creatinine Ratio 14.6     Anion Gap 9.0 mmol/L      eGFR 84.0 mL/min/1.73     Narrative:      GFR Normal >60  Chronic Kidney Disease <60  Kidney Failure <15    The GFR formula is only valid for adults with stable renal function between ages 18 and 70.    CK [[id number]]  (Abnormal) Collected: 12/27/23 0620    Specimen: Blood Updated: 12/27/23 0702     Creatine Kinase 1,018 U/L     Manual Differential [[id number]]  (Abnormal) Collected: 12/26/23 0744    Specimen: Blood from Arm, Left Updated: 12/26/23 0843     Neutrophil % 63.9 %      Lymphocyte % 18.6 %      Monocyte % 15.5 %      Basophil % 2.1 %      Neutrophils Absolute 3.73 10*3/mm3      Lymphocytes Absolute 1.08 10*3/mm3      Monocytes Absolute 0.90 10*3/mm3      Basophils Absolute 0.12 10*3/mm3      nRBC 2.1 /100 WBC      Hypochromia Mod/2+     Ovalocytes Slight/1+     Smudge Cells Slight/1+     Platelet Morphology Normal    Comprehensive Metabolic Panel [[id number]]  (Abnormal) Collected: 12/26/23 0744    Specimen: Blood from Arm, Left Updated: 12/26/23 0814     Glucose 85 mg/dL      BUN 17 mg/dL      Creatinine 0.93 mg/dL      Sodium 136 mmol/L      Potassium 3.9 mmol/L      Chloride 104 mmol/L      CO2 22.1 mmol/L      Calcium 8.7 mg/dL      Total Protein 6.1 g/dL      Albumin 3.7 g/dL      ALT (SGPT) 26 U/L      AST (SGOT) 54 U/L      Alkaline Phosphatase 58 U/L      Total Bilirubin 0.6 mg/dL      Globulin 2.4 gm/dL      A/G Ratio 1.5 g/dL      BUN/Creatinine Ratio 18.3     Anion Gap 9.9 mmol/L      eGFR 80.5 mL/min/1.73     Narrative:      GFR Normal >60  Chronic Kidney Disease <60  Kidney Failure <15    The GFR formula is only valid for adults with stable renal function between ages 18 and 70.    CK [[id number]]  (Abnormal)  "Collected: 12/26/23 0744    Specimen: Blood from Arm, Left Updated: 12/26/23 0814     Creatine Kinase 1,782 U/L     CBC & Differential [[id number]]  (Abnormal) Collected: 12/26/23 0744    Specimen: Blood from Arm, Left Updated: 12/26/23 0758    Narrative:      The following orders were created for panel order CBC & Differential.  Procedure                               Abnormality         Status                     ---------                               -----------         ------                     CBC Auto Differential[[id number]]        Abnormal            Final result                 Please view results for these tests on the individual orders.    CBC Auto Differential [[id number]]  (Abnormal) Collected: 12/26/23 0744    Specimen: Blood from Arm, Left Updated: 12/26/23 0758     WBC 5.83 10*3/mm3      RBC 4.23 10*6/mm3      Hemoglobin 11.3 g/dL      Hematocrit 35.6 %      MCV 84.2 fL      MCH 26.7 pg      MCHC 31.7 g/dL      RDW 16.8 %      RDW-SD 52.0 fl      MPV 9.6 fL      Platelets 208 10*3/mm3      nRBC 0.2 /100 WBC           BP 98/41 (BP Location: Left arm, Patient Position: Lying)   Pulse 71   Temp 97.7 °F (36.5 °C) (Oral)   Resp 18   Ht 180.3 cm (71\")   Wt 74.8 kg (165 lb)   SpO2 94%   BMI 23.01 kg/m²     Discharge Exam:  General Appearance:    Alert, cooperative, no distress                          Head:    Normocephalic, without obvious abnormality, atraumatic                          Eyes:                            Throat:   Lips, tongue, gums normal                          Neck:   Supple, symmetrical, trachea midline, no JVD                        Lungs:     Clear to auscultation bilaterally, respirations unlabored                Chest Wall:    No tenderness or deformity                        Heart:    Regular rate and rhythm, S1 and S2 normal, no murmur,no  Rub  or gallop                  Abdomen:     Soft, non-tender, bowel sounds active, no masses, no organomegaly                  " Extremities:   Extremities normal, atraumatic, no cyanosis or edema                             Skin:   Skin is warm and dry,  no rashes or palpable lesions                  Neurologic:   no focal deficits noted     Disposition:  Skilled nursing facility    Activity as tolerated    Diet as tolerated  Diet Order   Procedures    Diet: Regular/House Diet; Texture: Regular Texture (IDDSI 7); Fluid Consistency: Thin (IDDSI 0)       Patient Instructions:      Discharge Medications        New Medications        Instructions Start Date   acetaminophen 325 MG tablet  Commonly known as: TYLENOL   650 mg, Oral, Every 4 Hours PRN      guaiFENesin 600 MG 12 hr tablet  Commonly known as: MUCINEX   600 mg, Oral, Every 12 Hours Scheduled      guaiFENesin-dextromethorphan 100-10 MG/5ML syrup  Commonly known as: ROBITUSSIN DM   5 mL, Oral, Every 4 Hours PRN      HYDROcodone-acetaminophen 5-325 MG per tablet  Commonly known as: NORCO   1 tablet, Oral, Every 6 Hours PRN             Changes to Medications        Instructions Start Date   ondansetron ODT 4 MG disintegrating tablet  Commonly known as: ZOFRAN-ODT  What changed:   when to take this  reasons to take this   4 mg, Translingual, Every 6 Hours PRN             Continue These Medications        Instructions Start Date   albuterol (2.5 MG/3ML) 0.083% nebulizer solution  Commonly known as: PROVENTIL   2.5 mg, Nebulization, Every 6 Hours PRN      calcium carbonate 500 MG chewable tablet  Commonly known as: TUMS   1 tablet, Oral, As Needed      dicyclomine 10 MG capsule  Commonly known as: BENTYL   10 mg, Oral, 3 Times Daily PRN      FLUoxetine 20 MG capsule  Commonly known as: PROzac   TAKE 1 CAPSULE DAILY      gabapentin 300 MG capsule  Commonly known as: NEURONTIN   300 mg, Oral, 3 Times Daily      multivitamin with minerals tablet tablet   1 tablet, Oral, Daily      pantoprazole 40 MG EC tablet  Commonly known as: PROTONIX   TAKE 1 TABLET DAILY ON EMPTY STOMACH HALF AN HOUR BEFORE  MEAL      tiZANidine 2 MG tablet  Commonly known as: ZANAFLEX   2 mg, Oral, Nightly PRN      Umeclidinium-Vilanterol 62.5-25 MCG/ACT aerosol powder  inhaler  Commonly known as: ANORO ELLIPTA   1 puff, Inhalation, Daily - RT             Stop These Medications      ammonium lactate 12 % cream  Commonly known as: AMLACTIN     azelastine 0.1 % nasal spray  Commonly known as: ASTELIN     clotrimazole-betamethasone 1-0.05 % cream  Commonly known as: Lotrisone     fluticasone 50 MCG/ACT nasal spray  Commonly known as: FLONASE     furosemide 40 MG tablet  Commonly known as: LASIX     lidocaine 5 %  Commonly known as: LIDODERM     potassium chloride 20 MEQ CR tablet  Commonly known as: K-DUR,KLOR-CON     sodium chloride 7 % nebulizer solution nebulizer solution            Future Appointments   Date Time Provider Department Center   4/18/2024 10:00 AM [facility]   4/23/2024  2:30 PM Harpreet Kate MD [facility]      Contact information for follow-up providers       Harpreet Kate MD Follow up.    Specialty: Internal Medicine  Why: After released from rehab  Contact information:  [address] [facility] [id number] [phone]                       Contact information for after-discharge care       Destination       [facility] .    Service: Skilled Nursing  Contact information:  [address] [facility] [facility] [address] [phone]                                 Discharge Order (From admission, onward)       Start     Ordered    12/30/23 1301  Discharge patient  Once        Expected Discharge Date: 12/30/23   Discharge Disposition: Skilled Nursing Facility (DC - External)   Physician of Record for Attribution - Please select from Treatment Team: KELLIE GILLESPIE [[id number]]   Review needed by CMO to determine Physician of Record: No      Question Answer Comment   Physician of Record for Attribution - Please select from Treatment Team KELLIE GILLESPIE    Review needed by  CMO to determine Physician of Record No        12/30/23 1304                    Total time spent discharging patient including evaluation,post hospitalization follow up,  medication and post hospitalization instructions and education total time exceeds 30 minutes.    Signed:  Eduardo Hanks MD  12/30/2023  13:05 EST

## 2024-01-03 NOTE — CASE MANAGEMENT/SOCIAL WORK
Case Management Discharge Note      Final Note: skilled bed at [facility]    Provided Post Acute Provider List?: Yes  Post Acute Provider List: Nursing Home  Provided Post Acute Provider Quality & Resource List?: Yes  Post Acute Provider Quality and Resource List: Nursing Home  Delivered To: Patient  Method of Delivery: In person    Selected Continued Care - Discharged on 12/30/2023 Admission date: 12/26/2023 - Discharge disposition: Skilled Nursing Facility (DC - External)      Destination Coordination complete.      Service Provider Selected Services Address Phone Fax Patient Preferred    [facility] [address] [facility] [address] [phone] --              Durable Medical Equipment    No services have been selected for the patient.                Dialysis/Infusion    No services have been selected for the patient.                Home Medical Care    No services have been selected for the patient.                Therapy    No services have been selected for the patient.                Community Resources    No services have been selected for the patient.                Community & DME    No services have been selected for the patient.                    Selected Continued Care - Episodes Includes continued care and service providers with selected services from the active episodes listed below      High Risk Care Management Episode start date: 12/13/2022   There are no active outsourced providers for this episode.                      Final Discharge Disposition Code: 03 - skilled nursing facility (SNF)

## 2024-01-11 ENCOUNTER — PATIENT OUTREACH (OUTPATIENT)
Dept: CASE MANAGEMENT | Facility: OTHER | Age: 84
End: 2024-01-11
Payer: MEDICARE

## 2024-01-11 NOTE — OUTREACH NOTE
AMBULATORY CASE MANAGEMENT NOTE    Name and Relationship of Patient/Support Person: [facility] - Self  Other    SNF Follow-up    Questions/Answers      Flowsheet Row Responses   Acute Facility Discharged From [facility]   Acute Discharge Date 12/29/23   Name of the Skilled Nursing Facility? [facility]   Purpose of SNF Admission PT, SN   Who is the insurance provider or payor of patient stay? Medicare   Skilled Nursing Discharge Date? --  [TBD]          Patient Outreach    Outgoing call to Ricardo to follow up.  Verified patient remains in SNF.  Outreach for follow up scheduled.   Azeb PHAM  Ambulatory Case Management    1/11/2024, 13:02 EST

## 2024-01-16 LAB
AORTIC DIMENSIONLESS INDEX: 0.5 (DI)
ASCENDING AORTA: 4.5 CM
BH CV ECHO MEAS - ACS: 1.82 CM
BH CV ECHO MEAS - AO MAX PG: 12.6 MMHG
BH CV ECHO MEAS - AO MEAN PG: 6.9 MMHG
BH CV ECHO MEAS - AO ROOT DIAM: 3.7 CM
BH CV ECHO MEAS - AO V2 MAX: 177.1 CM/SEC
BH CV ECHO MEAS - AO V2 VTI: 42.9 CM
BH CV ECHO MEAS - AVA(I,D): 1.92 CM2
BH CV ECHO MEAS - EDV(CUBED): 82 ML
BH CV ECHO MEAS - EDV(MOD-SP2): 100 ML
BH CV ECHO MEAS - EDV(MOD-SP4): 99 ML
BH CV ECHO MEAS - EF(MOD-BP): 58.9 %
BH CV ECHO MEAS - EF(MOD-SP2): 61 %
BH CV ECHO MEAS - EF(MOD-SP4): 55.6 %
BH CV ECHO MEAS - ESV(CUBED): 27.3 ML
BH CV ECHO MEAS - ESV(MOD-SP2): 39 ML
BH CV ECHO MEAS - ESV(MOD-SP4): 44 ML
BH CV ECHO MEAS - FS: 30.7 %
BH CV ECHO MEAS - IVS/LVPW: 1.07 CM
BH CV ECHO MEAS - IVSD: 0.84 CM
BH CV ECHO MEAS - LAT PEAK E' VEL: 12.4 CM/SEC
BH CV ECHO MEAS - LV DIASTOLIC VOL/BSA (35-75): 50.9 CM2
BH CV ECHO MEAS - LV MASS(C)D: 108.9 GRAMS
BH CV ECHO MEAS - LV MAX PG: 4.1 MMHG
BH CV ECHO MEAS - LV MEAN PG: 1.9 MMHG
BH CV ECHO MEAS - LV SYSTOLIC VOL/BSA (12-30): 22.6 CM2
BH CV ECHO MEAS - LV V1 MAX: 101.1 CM/SEC
BH CV ECHO MEAS - LV V1 VTI: 22.7 CM
BH CV ECHO MEAS - LVIDD: 4.3 CM
BH CV ECHO MEAS - LVIDS: 3 CM
BH CV ECHO MEAS - LVOT AREA: 3.6 CM2
BH CV ECHO MEAS - LVOT DIAM: 2.15 CM
BH CV ECHO MEAS - LVPWD: 0.78 CM
BH CV ECHO MEAS - MED PEAK E' VEL: 11 CM/SEC
BH CV ECHO MEAS - MV A DUR: 0.17 SEC
BH CV ECHO MEAS - MV A MAX VEL: 84.8 CM/SEC
BH CV ECHO MEAS - MV DEC SLOPE: 266.2 CM/SEC2
BH CV ECHO MEAS - MV DEC TIME: 0.22 SEC
BH CV ECHO MEAS - MV E MAX VEL: 80.6 CM/SEC
BH CV ECHO MEAS - MV E/A: 0.95
BH CV ECHO MEAS - MV MAX PG: 3.6 MMHG
BH CV ECHO MEAS - MV MEAN PG: 1.51 MMHG
BH CV ECHO MEAS - MV P1/2T: 110.5 MSEC
BH CV ECHO MEAS - MV V2 VTI: 34.7 CM
BH CV ECHO MEAS - MVA(P1/2T): 1.99 CM2
BH CV ECHO MEAS - MVA(VTI): 2.37 CM2
BH CV ECHO MEAS - PA V2 MAX: 80.9 CM/SEC
BH CV ECHO MEAS - QP/QS: 0.49
BH CV ECHO MEAS - RAP SYSTOLE: 3 MMHG
BH CV ECHO MEAS - RV MAX PG: 1.81 MMHG
BH CV ECHO MEAS - RV V1 MAX: 67.3 CM/SEC
BH CV ECHO MEAS - RV V1 VTI: 12.9 CM
BH CV ECHO MEAS - RVOT DIAM: 2 CM
BH CV ECHO MEAS - SI(MOD-SP2): 31.4 ML/M2
BH CV ECHO MEAS - SI(MOD-SP4): 28.3 ML/M2
BH CV ECHO MEAS - SV(LVOT): 82.4 ML
BH CV ECHO MEAS - SV(MOD-SP2): 61 ML
BH CV ECHO MEAS - SV(MOD-SP4): 55 ML
BH CV ECHO MEAS - SV(RVOT): 40.4 ML
BH CV ECHO MEAS - TAPSE (>1.6): 3 CM
BH CV ECHO MEASUREMENTS AVERAGE E/E' RATIO: 6.89
BH CV ECHO SHUNT ASSESSMENT PERFORMED (HIDDEN SCRIPTING): 1
BH CV VAS BP RIGHT ARM: NORMAL MMHG
BH CV XLRA - RV BASE: 4.1 CM
BH CV XLRA - RV LENGTH: 8.1 CM
BH CV XLRA - RV MID: 3 CM
BH CV XLRA - TDI S': 12.4 CM/SEC
SINUS: 3.6 CM
STJ: 3.3 CM

## 2024-01-30 ENCOUNTER — PATIENT OUTREACH (OUTPATIENT)
Dept: CASE MANAGEMENT | Facility: OTHER | Age: 84
End: 2024-01-30
Payer: MEDICARE

## 2024-01-30 NOTE — OUTREACH NOTE
AMBULATORY CASE MANAGEMENT NOTE    Name and Relationship of Patient/Support Person: Ricardo -     SNF Follow-up    Questions/Answers      Flowsheet Row Responses   Acute Facility Discharged From [facility]   Acute Discharge Date 12/29/23   Name of the Skilled Nursing Facility? [facility]   Purpose of SNF Admission PT, SN   Who is the insurance provider or payor of patient stay? Medicare            Patient Outreach    Outgoing call to [facility] and verified that patient remains in SNF.  Outreach has been scheduled for follow up.     Azeb PHAM  Ambulatory Case Management    1/30/2024, 10:02 EST

## 2024-02-06 ENCOUNTER — READMISSION MANAGEMENT (OUTPATIENT)
Dept: CALL CENTER | Facility: HOSPITAL | Age: 84
End: 2024-02-06
Payer: MEDICARE

## 2024-02-06 ENCOUNTER — APPOINTMENT (OUTPATIENT)
Dept: GENERAL RADIOLOGY | Facility: HOSPITAL | Age: 84
End: 2024-02-06
Payer: MEDICARE

## 2024-02-06 ENCOUNTER — HOSPITAL ENCOUNTER (EMERGENCY)
Facility: HOSPITAL | Age: 84
Discharge: HOME | End: 2024-02-07
Attending: EMERGENCY MEDICINE | Admitting: EMERGENCY MEDICINE
Payer: MEDICARE

## 2024-02-06 VITALS
WEIGHT: 189 LBS | DIASTOLIC BLOOD PRESSURE: 69 MMHG | RESPIRATION RATE: 24 BRPM | HEART RATE: 85 BPM | BODY MASS INDEX: 26.46 KG/M2 | OXYGEN SATURATION: 91 % | SYSTOLIC BLOOD PRESSURE: 131 MMHG | TEMPERATURE: 96.9 F | HEIGHT: 71 IN

## 2024-02-06 DIAGNOSIS — Z87.09 HISTORY OF BRONCHIECTASIS: ICD-10-CM

## 2024-02-06 DIAGNOSIS — Z86.79 HISTORY OF CHF (CONGESTIVE HEART FAILURE): ICD-10-CM

## 2024-02-06 DIAGNOSIS — Z87.09 HISTORY OF COPD: ICD-10-CM

## 2024-02-06 DIAGNOSIS — R06.00 DYSPNEA, UNSPECIFIED TYPE: Primary | ICD-10-CM

## 2024-02-06 LAB
ALBUMIN SERPL-MCNC: 4 G/DL (ref 3.5–5.2)
ALBUMIN/GLOB SERPL: 1.7 G/DL
ALP SERPL-CCNC: 64 U/L (ref 39–117)
ALT SERPL W P-5'-P-CCNC: 15 U/L (ref 1–41)
ANION GAP SERPL CALCULATED.3IONS-SCNC: 12 MMOL/L (ref 5–15)
AST SERPL-CCNC: 23 U/L (ref 1–40)
B PARAPERT DNA SPEC QL NAA+PROBE: NOT DETECTED
B PERT DNA SPEC QL NAA+PROBE: NOT DETECTED
BASOPHILS # BLD AUTO: 0.05 10*3/MM3 (ref 0–0.2)
BASOPHILS NFR BLD AUTO: 0.7 % (ref 0–1.5)
BILIRUB SERPL-MCNC: 0.4 MG/DL (ref 0–1.2)
BUN SERPL-MCNC: 21 MG/DL (ref 8–23)
BUN/CREAT SERPL: 20.6 (ref 7–25)
C PNEUM DNA NPH QL NAA+NON-PROBE: NOT DETECTED
CALCIUM SPEC-SCNC: 9.1 MG/DL (ref 8.6–10.5)
CHLORIDE SERPL-SCNC: 104 MMOL/L (ref 98–107)
CO2 SERPL-SCNC: 22 MMOL/L (ref 22–29)
CREAT SERPL-MCNC: 1.02 MG/DL (ref 0.76–1.27)
D-LACTATE SERPL-SCNC: 1.2 MMOL/L (ref 0.5–2)
DEPRECATED RDW RBC AUTO: 44.9 FL (ref 37–54)
EGFRCR SERPLBLD CKD-EPI 2021: 72 ML/MIN/1.73
EOSINOPHIL # BLD AUTO: 0.57 10*3/MM3 (ref 0–0.4)
EOSINOPHIL NFR BLD AUTO: 8.1 % (ref 0.3–6.2)
ERYTHROCYTE [DISTWIDTH] IN BLOOD BY AUTOMATED COUNT: 14.7 % (ref 12.3–15.4)
FLUAV SUBTYP SPEC NAA+PROBE: NOT DETECTED
FLUBV RNA ISLT QL NAA+PROBE: NOT DETECTED
GLOBULIN UR ELPH-MCNC: 2.3 GM/DL
GLUCOSE SERPL-MCNC: 102 MG/DL (ref 65–99)
HADV DNA SPEC NAA+PROBE: NOT DETECTED
HCOV 229E RNA SPEC QL NAA+PROBE: NOT DETECTED
HCOV HKU1 RNA SPEC QL NAA+PROBE: NOT DETECTED
HCOV NL63 RNA SPEC QL NAA+PROBE: NOT DETECTED
HCOV OC43 RNA SPEC QL NAA+PROBE: NOT DETECTED
HCT VFR BLD AUTO: 32.4 % (ref 37.5–51)
HGB BLD-MCNC: 10.3 G/DL (ref 13–17.7)
HMPV RNA NPH QL NAA+NON-PROBE: NOT DETECTED
HOLD SPECIMEN: NORMAL
HOLD SPECIMEN: NORMAL
HPIV1 RNA ISLT QL NAA+PROBE: NOT DETECTED
HPIV2 RNA SPEC QL NAA+PROBE: NOT DETECTED
HPIV3 RNA NPH QL NAA+PROBE: NOT DETECTED
HPIV4 P GENE NPH QL NAA+PROBE: NOT DETECTED
IMM GRANULOCYTES # BLD AUTO: 0.02 10*3/MM3 (ref 0–0.05)
IMM GRANULOCYTES NFR BLD AUTO: 0.3 % (ref 0–0.5)
LYMPHOCYTES # BLD AUTO: 1.55 10*3/MM3 (ref 0.7–3.1)
LYMPHOCYTES NFR BLD AUTO: 22 % (ref 19.6–45.3)
M PNEUMO IGG SER IA-ACNC: NOT DETECTED
MCH RBC QN AUTO: 27 PG (ref 26.6–33)
MCHC RBC AUTO-ENTMCNC: 31.8 G/DL (ref 31.5–35.7)
MCV RBC AUTO: 84.8 FL (ref 79–97)
MONOCYTES # BLD AUTO: 0.92 10*3/MM3 (ref 0.1–0.9)
MONOCYTES NFR BLD AUTO: 13.1 % (ref 5–12)
NEUTROPHILS NFR BLD AUTO: 3.93 10*3/MM3 (ref 1.7–7)
NEUTROPHILS NFR BLD AUTO: 55.8 % (ref 42.7–76)
NRBC BLD AUTO-RTO: 0 /100 WBC (ref 0–0.2)
NT-PROBNP SERPL-MCNC: 597 PG/ML (ref 0–1800)
PLATELET # BLD AUTO: 265 10*3/MM3 (ref 140–450)
PMV BLD AUTO: 9.5 FL (ref 6–12)
POTASSIUM SERPL-SCNC: 4.5 MMOL/L (ref 3.5–5.2)
PROCALCITONIN SERPL-MCNC: 0.06 NG/ML (ref 0–0.25)
PROT SERPL-MCNC: 6.3 G/DL (ref 6–8.5)
QT INTERVAL: 382 MS
QTC INTERVAL: 468 MS
RBC # BLD AUTO: 3.82 10*6/MM3 (ref 4.14–5.8)
RHINOVIRUS RNA SPEC NAA+PROBE: NOT DETECTED
RSV RNA NPH QL NAA+NON-PROBE: NOT DETECTED
SARS-COV-2 RNA NPH QL NAA+NON-PROBE: NOT DETECTED
SODIUM SERPL-SCNC: 138 MMOL/L (ref 136–145)
TROPONIN T SERPL HS-MCNC: 31 NG/L
WBC NRBC COR # BLD AUTO: 7.04 10*3/MM3 (ref 3.4–10.8)
WHOLE BLOOD HOLD COAG: NORMAL
WHOLE BLOOD HOLD SPECIMEN: NORMAL

## 2024-02-06 PROCEDURE — 84145 PROCALCITONIN (PCT): CPT | Performed by: EMERGENCY MEDICINE

## 2024-02-06 PROCEDURE — 83880 ASSAY OF NATRIURETIC PEPTIDE: CPT | Performed by: EMERGENCY MEDICINE

## 2024-02-06 PROCEDURE — 94799 UNLISTED PULMONARY SVC/PX: CPT

## 2024-02-06 PROCEDURE — 71045 X-RAY EXAM CHEST 1 VIEW: CPT

## 2024-02-06 PROCEDURE — 94640 AIRWAY INHALATION TREATMENT: CPT

## 2024-02-06 PROCEDURE — 93005 ELECTROCARDIOGRAM TRACING: CPT | Performed by: EMERGENCY MEDICINE

## 2024-02-06 PROCEDURE — 0202U NFCT DS 22 TRGT SARS-COV-2: CPT | Performed by: EMERGENCY MEDICINE

## 2024-02-06 PROCEDURE — 93010 ELECTROCARDIOGRAM REPORT: CPT | Performed by: INTERNAL MEDICINE

## 2024-02-06 PROCEDURE — 83605 ASSAY OF LACTIC ACID: CPT | Performed by: EMERGENCY MEDICINE

## 2024-02-06 PROCEDURE — 99284 EMERGENCY DEPT VISIT MOD MDM: CPT

## 2024-02-06 PROCEDURE — 85025 COMPLETE CBC W/AUTO DIFF WBC: CPT | Performed by: EMERGENCY MEDICINE

## 2024-02-06 PROCEDURE — 80053 COMPREHEN METABOLIC PANEL: CPT | Performed by: EMERGENCY MEDICINE

## 2024-02-06 PROCEDURE — 94761 N-INVAS EAR/PLS OXIMETRY MLT: CPT

## 2024-02-06 PROCEDURE — 84484 ASSAY OF TROPONIN QUANT: CPT | Performed by: EMERGENCY MEDICINE

## 2024-02-06 RX ORDER — IPRATROPIUM BROMIDE AND ALBUTEROL SULFATE 2.5; .5 MG/3ML; MG/3ML
3 SOLUTION RESPIRATORY (INHALATION) ONCE
Status: COMPLETED | OUTPATIENT
Start: 2024-02-06 | End: 2024-02-06

## 2024-02-06 RX ORDER — SODIUM CHLORIDE 0.9 % (FLUSH) 0.9 %
10 SYRINGE (ML) INJECTION AS NEEDED
Status: DISCONTINUED | OUTPATIENT
Start: 2024-02-06 | End: 2024-02-07 | Stop reason: HOSPADM

## 2024-02-06 RX ADMIN — IPRATROPIUM BROMIDE AND ALBUTEROL SULFATE 3 ML: 2.5; .5 SOLUTION RESPIRATORY (INHALATION) at 21:10

## 2024-02-07 ENCOUNTER — TRANSITIONAL CARE MANAGEMENT TELEPHONE ENCOUNTER (OUTPATIENT)
Dept: CALL CENTER | Facility: HOSPITAL | Age: 84
End: 2024-02-07
Payer: MEDICARE

## 2024-02-07 NOTE — OUTREACH NOTE
Prep Survey      Flowsheet Row Responses   Congregational facility patient discharged from? Non-BH   Is LACE score < 7 ? Non- Discharge   Eligibility [facility]   Date of Discharge 02/06/24   Discharge diagnosis Rhabdomyolysis   Does the patient have one of the following disease processes/diagnoses(primary or secondary)? Other   Prep survey completed? Yes            HARRIETT JAMISON - Registered Nurse

## 2024-02-07 NOTE — ED PROVIDER NOTES
EMERGENCY DEPARTMENT ENCOUNTER    Room Number:  35/35  PCP: Harpreet Kate MD  Historian: Patient      HPI:  Chief Complaint: Shortness of breath  A complete HPI/ROS/PMH/PSH/SH/FH are unobtainable due to: None    Context: Tony Leonard is a 83 y.o. male who presents to the ED via private vehicle c/o needing a breathing treatment.  States that a piece to his home nebulizer is broken and he was unable to use his breathing treatment which he uses regularly, came today only for breathing treatment.  States that he feels pretty much at his baseline levels just that he needs a breathing treatment.  No fevers, chills, no significant escalating cough.  States that he was recently on a course of antibiotics for bronchitis but feels better.  Wears nighttime O2.  No significant increased peripheral edema.      MEDICAL RECORD REVIEW    External (non-ED) record review: Adult transthoracic echo January 16, 2024 shows ejection fraction 56 to 60%              PAST MEDICAL HISTORY  Active Ambulatory Problems     Diagnosis Date Noted    Thrush, oral 03/11/2016    ADD (attention deficit disorder) 03/11/2016    Hemorrhoids 03/11/2016    Bronchiectasis with acute lower respiratory infection 03/11/2016    Diverticul disease small and large intestine, no perforati or abscess 03/11/2016    Benign non-nodular prostatic hyperplasia without lower urinary tract symptoms 03/11/2016    Gastroesophageal reflux disease 03/11/2016    Malaise and fatigue 03/11/2016    Environmental allergies 04/25/2016    Hemoptysis 04/27/2016    Dyslipidemia 05/11/2016    Primary osteoarthritis involving multiple joints 05/11/2016    Pneumonia of right lower lobe due to infectious organism 10/03/2016    Abnormal EKG 10/04/2016    COPD (chronic obstructive pulmonary disease) 10/04/2016    Mild malnutrition 03/28/2017    Acute on chronic respiratory failure with hypoxia 04/27/2017    Fracture, intertrochanteric, right femur, closed, initial encounter 01/16/2018     Chronic diastolic CHF (congestive heart failure) 01/16/2018    Hematoma of frontal scalp 01/16/2018    Postoperative anemia due to acute blood loss 01/19/2018    Urinary retention 01/25/2018    Hemorrhagic disorder due to circulating anticoagulants 01/29/2018    History of fracture of right hip 02/22/2018    Closed fracture of right hip with routine healing 02/28/2018    Chronic low back pain with sciatica 06/21/2018    Change in bowel function 04/18/2019    Rectal bleeding 04/18/2019    Prostate cancer 04/22/2019    On home oxygen therapy 09/24/2019    Acute viral bronchitis 12/29/2019    Peripheral neuropathy 07/01/2021    Plantar fasciitis 09/08/2021    HTN (hypertension) 05/06/2022    COPD exacerbation 05/07/2022    Bilateral lower extremity edema 05/07/2022    Microscopic hematuria 05/08/2022    Acute midline low back pain with right-sided sciatica 08/01/2022    Spinal stenosis, lumbar region with neurogenic claudication 08/02/2022    Compression deformity of vertebra 08/02/2022    Rectal bleed 09/23/2022    Esophagitis 09/24/2022    Angiectasia 09/27/2022    Hiatal hernia 09/27/2022    Overweight (BMI 25.0-29.9) 11/14/2022    Leukocytosis 11/15/2022    Bilateral hip pain 11/21/2022    Elevated troponin level not due myocardial infarction 12/10/2022    Nocturnal hypoxia 12/10/2022    Pneumonia of right upper lobe due to infectious organism 12/29/2022    NSTEMI (non-ST elevated myocardial infarction) 01/29/2023    Chronic respiratory failure with hypoxia 01/29/2023    Paroxysmal atrial fibrillation 02/17/2023    Acute exacerbation of chronic obstructive pulmonary disease (COPD) 05/10/2023    Anemia 05/10/2023    Non-compliant patient 05/11/2023    Hypokalemia 10/01/2020    Spondylolisthesis of lumbar region 08/14/2018    Elevated troponin 12/22/2023    Rhabdomyolysis 12/26/2023    Multiple contusions 12/26/2023    Fall 12/26/2023     Resolved Ambulatory Problems     Diagnosis Date Noted    Pneumonia of both  lower lobes due to infectious organism 03/11/2016    Pneumonia of right upper lobe due to infectious organism 04/22/2016    COPD exacerbation 04/25/2016    GERD (gastroesophageal reflux disease) 04/25/2016    Chronic respiratory failure with hypoxia 10/04/2016    Bacterial lobar pneumonia 12/27/2016    Pneumonia of left lower lobe due to infectious organism 03/26/2017    Bronchitis 06/01/2017    COPD exacerbation 06/17/2017    Leukocytosis 01/16/2018    Right upper lobe pneumonia 01/20/2018    Mucus plugging of bronchi 01/16/2018    COPD exacerbation 04/16/2018    Degenerative joint disease (DJD) of hip 09/23/2019    Bronchiectasis with (acute) exacerbation 12/28/2019    Septic shock 11/26/2022    Acute saddle pulmonary embolism without acute cor pulmonale  - 12/11/2022 12/11/2022     Past Medical History:   Diagnosis Date    ADHD (attention deficit hyperactivity disorder)     Bronchiectasis     CHF (congestive heart failure)     Colon polyp     Diverticulosis     Hard of hearing     Pneumonia          PAST SURGICAL HISTORY  Past Surgical History:   Procedure Laterality Date    BRONCHOSCOPY N/A 4/30/2016    Procedure: BRONCHOSCOPY with BAL of right lower lobe and left  lower lobe.  ;  Surgeon: Nando Diaz MD;  Location: [facility] ENDOSCOPY;  Service:     BRONCHOSCOPY N/A 4/28/2017    Procedure: BRONCHOSCOPY;  Surgeon: Katharina Salter MD;  Location: [facility] ENDOSCOPY;  Service:     BRONCHOSCOPY Bilateral 6/29/2017    Procedure: BRONCHOSCOPY WITH WASHINGS;  Surgeon: Katharina Salter MD;  Location: [facility] ENDOSCOPY;  Service:     BRONCHOSCOPY N/A 1/22/2018    Procedure: BRONCHOSCOPY AT BEDSIDE with BAL;  Surgeon: Katharina Salter MD;  Location: [facility] ENDOSCOPY;  Service:     BRONCHOSCOPY N/A 1/30/2018    Procedure: BRONCHOSCOPY with BAL and washing;  Surgeon: Shreyas Wild MD;  Location: [facility] ENDOSCOPY;  Service:     BRONCHOSCOPY Bilateral 4/24/2018    Procedure: BRONCHOSCOPY WITH WASHING;  Surgeon: Katharina Salter MD;   Location: [facility] ENDOSCOPY;  Service: Pulmonary    BRONCHOSCOPY N/A 12/28/2019    Procedure: BRONCHOSCOPY with bilateral washing;  Surgeon: Katharina Salter MD;  Location: [facility] ENDOSCOPY;  Service: Pulmonary    BRONCHOSCOPY Bilateral 1/4/2023    Procedure: BRONCHOSCOPY with lavage;  Surgeon: Katharina Salter MD;  Location: [facility] ENDOSCOPY;  Service: Pulmonary;  Laterality: Bilateral;  mucus plugging    CARDIAC CATHETERIZATION N/A 1/29/2023    Procedure: Left Heart Cath;  Surgeon: Johnnie Ang MD;  Location: [facility] CATH INVASIVE LOCATION;  Service: Cardiology;  Laterality: N/A;    CARDIAC CATHETERIZATION N/A 1/29/2023    Procedure: Coronary angiography;  Surgeon: Johnnie Ang MD;  Location: [facility] CATH INVASIVE LOCATION;  Service: Cardiology;  Laterality: N/A;    COLONOSCOPY  05/17/2013    eh, ih, tort, sig tics    COLONOSCOPY N/A 5/10/2019    Non-thrombosed external hemorrhoids found on perianal exam, Diverticulosis, Tortuous colon, One 5 mm polyp in the mid ascending colon, IH. Path: Tubular adenoma.     COLONOSCOPY N/A 9/24/2022    Procedure: COLONOSCOPY to cecum and TI with argon plasma coagulation.;  Surgeon: Bruce Black MD;  Location: [facility] ENDOSCOPY;  Service: Gastroenterology;  Laterality: N/A;  pre- GI bleeding  post- radiation proctitis, diverticulosis    ENDOSCOPY  03/19/2015    z line irreg, hh    ENDOSCOPY N/A 9/24/2022    Procedure: ESOPHAGOGASTRODUODENOSCOPY;  Surgeon: Bruce Black MD;  Location: [facility] ENDOSCOPY;  Service: Gastroenterology;  Laterality: N/A;  pre- GI bleeding  post- esophagitis, hiatal hernia    HIP OPEN REDUCTION Right 1/17/2018    Procedure: HIP OPEN REDUCTION INTERNAL FIXATION WITH DYNAMIC HIP SCREW;  Surgeon: Les Black MD;  Location: [facility] MAIN OR;  Service:     SPINE SURGERY      TONSILLECTOMY      TOTAL HIP ARTHROPLASTY REVISION Right 9/23/2019    Procedure: HIP  REVISION RIGHT;  Surgeon: Isauro Warner MD;  Location: [facility] MAIN OR;   Service: Orthopedics         FAMILY HISTORY  Family History   Problem Relation Age of Onset    Thyroid disease Mother     No Known Problems Father     Malig Hyperthermia Neg Hx          SOCIAL HISTORY  Social History     Socioeconomic History    Marital status: Single   Tobacco Use    Smoking status: Never    Smokeless tobacco: Never   Vaping Use    Vaping Use: Never used   Substance and Sexual Activity    Alcohol use: No     Comment: red wine occassional    Drug use: No    Sexual activity: Defer         ALLERGIES  Daliresp [roflumilast], Latex, and Sulfa antibiotics        REVIEW OF SYSTEMS  Review of Systems     All systems reviewed and negative except for those discussed in HPI.       PHYSICAL EXAM    I have reviewed the triage vital signs and nursing notes.    ED Triage Vitals   Temp Heart Rate Resp BP SpO2   02/06/24 2024 02/06/24 2024 02/06/24 2029 02/06/24 2029 02/06/24 2024   96.9 °F (36.1 °C) 116 24 155/80 92 %      Temp src Heart Rate Source Patient Position BP Location FiO2 (%)   02/06/24 2024 02/06/24 2024 -- -- --   Tympanic Monitor          Physical Exam  General: No acute distress, nontoxic  HEENT: Mucous membranes moist, atraumatic, EOMI  Neck: Full ROM  Pulm: Symmetric chest rise, nonlabored, lungs with scattered expiratory wheezes  Cardiovascular: Regular rate and rhythm, intact distal pulses  GI: Soft, nontender, nondistended, no rebound, no guarding, bowel sounds present  MSK: Full ROM, no deformity  Skin: Warm, dry  Neuro: Awake, alert, oriented x 4, GCS 15, moving all extremities, no focal deficits  Psych: Calm, cooperative        LAB RESULTS  Recent Results (from the past 24 hour(s))   Respiratory Panel PCR w/COVID-19(SARS-CoV-2) [facility] In-House, NP Swab in UTM/VTM, 2 HR TAT - Swab, Nasopharynx    Collection Time: 02/06/24  8:34 PM    Specimen: Nasopharynx; Swab   Result Value Ref Range    ADENOVIRUS, PCR Not Detected Not Detected    Coronavirus 229E Not Detected Not Detected     Coronavirus HKU1 Not Detected Not Detected    Coronavirus NL63 Not Detected Not Detected    Coronavirus OC43 Not Detected Not Detected    COVID19 Not Detected Not Detected - Ref. Range    Human Metapneumovirus Not Detected Not Detected    Human Rhinovirus/Enterovirus Not Detected Not Detected    Influenza A PCR Not Detected Not Detected    Influenza B PCR Not Detected Not Detected    Parainfluenza Virus 1 Not Detected Not Detected    Parainfluenza Virus 2 Not Detected Not Detected    Parainfluenza Virus 3 Not Detected Not Detected    Parainfluenza Virus 4 Not Detected Not Detected    RSV, PCR Not Detected Not Detected    Bordetella pertussis pcr Not Detected Not Detected    Bordetella parapertussis PCR Not Detected Not Detected    Chlamydophila pneumoniae PCR Not Detected Not Detected    Mycoplasma pneumo by PCR Not Detected Not Detected   Comprehensive Metabolic Panel    Collection Time: 02/06/24  8:38 PM    Specimen: Blood   Result Value Ref Range    Glucose 102 (H) 65 - 99 mg/dL    BUN 21 8 - 23 mg/dL    Creatinine 1.02 0.76 - 1.27 mg/dL    Sodium 138 136 - 145 mmol/L    Potassium 4.5 3.5 - 5.2 mmol/L    Chloride 104 98 - 107 mmol/L    CO2 22.0 22.0 - 29.0 mmol/L    Calcium 9.1 8.6 - 10.5 mg/dL    Total Protein 6.3 6.0 - 8.5 g/dL    Albumin 4.0 3.5 - 5.2 g/dL    ALT (SGPT) 15 1 - 41 U/L    AST (SGOT) 23 1 - 40 U/L    Alkaline Phosphatase 64 39 - 117 U/L    Total Bilirubin 0.4 0.0 - 1.2 mg/dL    Globulin 2.3 gm/dL    A/G Ratio 1.7 g/dL    BUN/Creatinine Ratio 20.6 7.0 - 25.0    Anion Gap 12.0 5.0 - 15.0 mmol/L    eGFR 72.0 >60.0 mL/min/1.73   BNP    Collection Time: 02/06/24  8:38 PM    Specimen: Blood   Result Value Ref Range    proBNP 597.0 0.0 - 1,800.0 pg/mL   Single High Sensitivity Troponin T    Collection Time: 02/06/24  8:38 PM    Specimen: Blood   Result Value Ref Range    HS Troponin T 31 (H) <22 ng/L   Green Top (Gel)    Collection Time: 02/06/24  8:38 PM   Result Value Ref Range    Extra Tube Hold  for add-ons.    Lavender Top    Collection Time: 02/06/24  8:38 PM   Result Value Ref Range    Extra Tube hold for add-on    Gold Top - SST    Collection Time: 02/06/24  8:38 PM   Result Value Ref Range    Extra Tube Hold for add-ons.    Light Blue Top    Collection Time: 02/06/24  8:38 PM   Result Value Ref Range    Extra Tube Hold for add-ons.    CBC Auto Differential    Collection Time: 02/06/24  8:38 PM    Specimen: Blood   Result Value Ref Range    WBC 7.04 3.40 - 10.80 10*3/mm3    RBC 3.82 (L) 4.14 - 5.80 10*6/mm3    Hemoglobin 10.3 (L) 13.0 - 17.7 g/dL    Hematocrit 32.4 (L) 37.5 - 51.0 %    MCV 84.8 79.0 - 97.0 fL    MCH 27.0 26.6 - 33.0 pg    MCHC 31.8 31.5 - 35.7 g/dL    RDW 14.7 12.3 - 15.4 %    RDW-SD 44.9 37.0 - 54.0 fl    MPV 9.5 6.0 - 12.0 fL    Platelets 265 140 - 450 10*3/mm3    Neutrophil % 55.8 42.7 - 76.0 %    Lymphocyte % 22.0 19.6 - 45.3 %    Monocyte % 13.1 (H) 5.0 - 12.0 %    Eosinophil % 8.1 (H) 0.3 - 6.2 %    Basophil % 0.7 0.0 - 1.5 %    Immature Grans % 0.3 0.0 - 0.5 %    Neutrophils, Absolute 3.93 1.70 - 7.00 10*3/mm3    Lymphocytes, Absolute 1.55 0.70 - 3.10 10*3/mm3    Monocytes, Absolute 0.92 (H) 0.10 - 0.90 10*3/mm3    Eosinophils, Absolute 0.57 (H) 0.00 - 0.40 10*3/mm3    Basophils, Absolute 0.05 0.00 - 0.20 10*3/mm3    Immature Grans, Absolute 0.02 0.00 - 0.05 10*3/mm3    nRBC 0.0 0.0 - 0.2 /100 WBC   ECG 12 Lead ED Triage Standing Order; SOA    Collection Time: 02/06/24  8:42 PM   Result Value Ref Range    QT Interval 382 ms    QTC Interval 468 ms       Ordered the above labs and independently interpreted results. My findings will be discussed in the medical decision making section below        RADIOLOGY  XR Chest 1 View    Result Date: 2/6/2024  Portable chest x-ray  HISTORY: Shortness of breath, COPD and heart failure.  TECHNIQUE: Portable chest x-ray correlated with chest x-ray December 22, 2023.  FINDINGS: Normal cardiomediastinal silhouette. Abnormal asymmetric density in the  perihilar right lung and in the right upper lung zone most likely representing pneumonia. The left lung appears clear.      New areas of abnormal density in the right lung suspected represent pneumonia.  This report was finalized on 2/6/2024 9:12 PM by Dr. Lennox Smith M.D on Workstation: [email or web address]       Ordered the above noted radiological studies.  Independently interpreted by me and my independent review of findings can be found in the ED Course.  See dictation for official radiology interpretation.      PROCEDURES    Procedures      EKG - Per my independent interpretation at 2050:    EKG Time: 2042  Rhythm/Rate: Sinus rhythm with rate of 90  Normal axis  Normal intervals  Nonspecific T wave abnormalities   No STEMI       No emergent changes compared to December 21, 2023      MEDICATIONS GIVEN IN ER    Medications   sodium chloride 0.9 % flush 10 mL (has no administration in time range)   ipratropium-albuterol (DUO-NEB) nebulizer solution 3 mL (3 mL Nebulization Given 2/6/24 2110)         PROGRESS, DATA ANALYSIS, CONSULTS, AND MEDICAL DECISION MAKING    Please note that this section constitutes my independent interpretation of clinical data including lab results, radiology, EKG's.  This constitutes my independent professional opinion regarding differential diagnosis and management of this patient.  It may include any factors such as history from outside sources, review of external records, social determinants of health, management of medications, response to those treatments, and discussions with other providers.    Differential Diagnosis and Plan: Patient comes in complaining of a broken piece to his nebulizer and just needs his regularly scheduled breathing treatment.  Denies any acute complaints really and feels at baseline overall.  Will check basic labs and chest x-ray but ultimately have lower concern for acute issue although will consider a viral process, heart failure, pneumonia, among  others.    Additional sources:  - Discussed/ obtained information from independent historians:       - (Social Determinants of Health): None     - Shared decision making:  Patient fully updated on and in agreement with the course and plan moving forward    ED Course as of 02/07/24 1455   Tue Feb 06, 2024 2054 XR Chest 1 View  Per my independent interpretation of the chest x-ray, no evidence of pneumothorax [DC]   2119 WBC: 7.04 [DC]   2119 Hemoglobin(!): 10.3 [DC]   2119 Platelets: 265 [DC]   2123 XR Chest 1 View  Radiology report reviewed, new area of abnormal density in the right lung suspected to represent pneumonia [DC]   2157 Glucose(!): 102 [DC]   2157 BUN: 21 [DC]   2157 Creatinine: 1.02 [DC]   2157 Sodium: 138 [DC]   2157 Potassium: 4.5 [DC]   2157 ALT (SGPT): 15 [DC]   2157 AST (SGOT): 23 [DC]   2157 Alkaline Phosphatase: 64 [DC]   2157 Total Bilirubin: 0.4 [DC]   2157 Respiratory Panel PCR w/COVID-19(SARS-CoV-2) [facility] In-House, NP Swab in UTM/VTM, 2 HR TAT - Swab, Nasopharynx  Pan-negative [DC]   2220 proBNP: 597.0 [DC]   2259 Lactate: 1.2 [DC]      ED Course User Index  [DC] Lee Koehler MD     Patient maintaining mid 90s sats on room air, feels completely at baseline. No leukocytosis, negative procal, negative lactic. XR findings unlikely to represent clinically significant pneumonia at this point given labs, lack of fever, and baseline condition. Ultimately only came in because a piece of his nebulizer was broken and needed a normal scheduled breathing treatment. He was sent home with a replacement piece to be able to continue home treatments. Outpatient followup with in 1-2 weeks as needed, ED return for worsening symptoms as needed.     Hospitalization Considered?:     Orders Placed During This Visit:  Orders Placed This Encounter   Procedures    Respiratory Panel PCR w/COVID-19(SARS-CoV-2) [facility] In-House, NP Swab in UTM/VTM, 2 HR TAT - Swab, Nasopharynx    XR  Chest 1 View    [place] Draw    Comprehensive Metabolic Panel    BNP    Single High Sensitivity Troponin T    CBC Auto Differential    Procalcitonin    Lactic Acid, Plasma    NPO Diet NPO Type: Strict NPO    Undress & Gown    Continuous Pulse Oximetry    Vital Signs    Oxygen Therapy- Nasal Cannula; Titrate 1-6 LPM Per SpO2; 90 - 95%    ECG 12 Lead ED Triage Standing Order; SOA    Insert Peripheral IV    CBC & Differential    Green Top (Gel)    Lavender Top    Gold Top - SST    Light Blue Top       Additional orders considered but not placed:      Independent interpretation of labs, radiology studies, and discussions with consultants: See ED Course        AS OF 22:30 EST VITALS:    BP - 136/94  HR - 84  TEMP - 96.9 °F (36.1 °C) (Tympanic)  02 SATS - 95%          DIAGNOSIS   Diagnosis Plan   1. Dyspnea, unspecified type        2. History of COPD        3. History of CHF (congestive heart failure)        4. History of bronchiectasis                  DISPOSITION  DISCHARGE    Patient discharged in stable condition.    Reviewed implications of results, diagnosis, meds, responsibility to follow up, warning signs and symptoms of possible worsening, potential complications and reasons to return to ER. If your blood pressure > 120/80 please follow up with your primary care provider for further management.    Patient/Family voiced understanding of above instructions.    Discussed plan for discharge, as there is no emergent indication for admission. Pt/family is agreeable and understands need for follow up and repeat testing.  Pt is aware that discharge does not mean that nothing is wrong but it indicates no emergency is present that requires admission and they must continue care with follow-up as given below or physician of their choice.     FOLLOW-UP  [facility] EMERGENCY DEPARTMENT  [address] [facility] [id number] [phone]    As needed, If symptoms worsen    Harpreet Kate MD  [id number]  [facility] [address]  [phone]    Schedule an appointment as soon as possible for a visit   for recheck within 1-2 weeks as needed         Medication List      No changes were made to your prescriptions during this visit.             Please note that portions of this document were completed with a voice recognition program.    Note Disclaimer: At [facility], we believe that sharing information builds trust and better relationships. You are receiving this note because you recently visited [facility]. It is possible you will see health information before a provider has talked with you about it. This kind of information can be easy to misunderstand. To help you fully understand what it means for your health, we urge you to discuss this note with your provider.                       Lee Koehler MD  02/07/24 0961

## 2024-02-07 NOTE — DISCHARGE INSTRUCTIONS
Continue care medications, close follow-up with PCP for recheck within 1 to 2 weeks as needed, ED return for worsening symptoms as needed.

## 2024-02-07 NOTE — OUTREACH NOTE
Call Center TCM Note      Flowsheet Row Responses   [facility] patient discharged from? Non-  [[facility]]   Does the patient have one of the following disease processes/diagnoses(primary or secondary)? Other   TCM attempt successful? No   Unsuccessful attempts Attempt 1            Mera Fairchild RN    2/7/2024, 12:50 EST

## 2024-02-08 ENCOUNTER — TRANSITIONAL CARE MANAGEMENT TELEPHONE ENCOUNTER (OUTPATIENT)
Dept: CALL CENTER | Facility: HOSPITAL | Age: 84
End: 2024-02-08
Payer: MEDICARE

## 2024-02-08 NOTE — OUTREACH NOTE
Call Center TCM Note      Flowsheet Row Responses   [facility] patient discharged from? Non-  [[facility]]   Does the patient have one of the following disease processes/diagnoses(primary or secondary)? Other   TCM attempt successful? Yes   Call start time 1222   Call end time 1225   Discharge diagnosis Rhabdomyolysis   Is patient permission given to speak with other caregiver? Yes   List who call center can speak with sonCampos Finch (Listed on PCP verbal release)   Person spoke with today (if not patient) and relationship son   Does the patient have an appointment with their PCP within 7-14 days of discharge? No   Nursing Interventions Routed TCM call to PCP office   Has home health visited the patient within 72 hours of discharge? N/A   What is the patient's perception of their health status since discharge? Improving   Is the patient/caregiver able to teach back signs and symptoms related to disease process for when to call PCP? Yes   Is the patient/caregiver able to teach back signs and symptoms related to disease process for when to call 911? Yes   Is the patient/caregiver able to teach back the hierarchy of who to call/visit for symptoms/problems? PCP, Specialist, Home health nurse, Urgent Care, ED, 911 Yes   TCM call completed? Yes   Wrap up additional comments Per son, patient was doing well when he dropped him off, no questions, son states he instructed patient to make his f/u appts.   Call end time 1225   Would this patient benefit from a Referral to Amb Social Work? No   Is the patient interested in additional calls from an ambulatory ? No            Mera Fairchild RN    2/8/2024, 12:26 EST

## 2024-02-09 NOTE — PROGRESS NOTES
Discharge Planning Assessment  [facility]     Patient Name: Tony Leonard  MRN: [id number]  Today's Date: 6/1/2017    Admit Date: 5/31/2017          Discharge Needs Assessment       06/01/17 1537    Living Environment    Lives With alone    Living Arrangements apartment   3rd floor apartment with elevator access.    Transportation Available family or friend will provide;car    Living Environment    Able to Return to Prior Living Arrangements yes    Discharge Needs Assessment    Equipment Currently Used at Home oxygen;walker, rolling;cane, straight    Discharge Planning Comments S/W pt at bedside.  Facesheet info corrected. Pt lives at [facility] (independent living apartment) on 3rd floor with elevator access.  Pt states he is IADLs and can drive.  Home DME includes a cane, walker, and home O2 supplied by [employer].  He has 2 sons who live locally.  He has had [facility] in the past, and has been to [facility].  Pt plans to return home to his apartment upon DC.            Discharge Plan       06/01/17 1542    Case Management/Social Work Plan    Plan Pt plans to to return to his independent living apartment upon DC.  He states his son will transport him home.           Demographic Summary       06/01/17 1535    Referral Information    Admission Type observation    Arrived From home or self-care    Referral Source admission list    Reason For Consult discharge planning    Record Reviewed medical record    Primary Care Physician Information    Name Erich Aviles MD            Functional Status       06/01/17 1536    Functional Status Current    Ambulation 2-->assistive person    Transferring 2-->assistive person    Toileting 2-->assistive person    Bathing 2-->assistive person    Dressing 0-->independent    Eating 0-->independent    Communication 0-->understands/communicates without difficulty    Swallowing (if score 2 or more for any item, consult Rehab Services) 0-->swallows foods/liquids without  difficulty    Functional Status Prior    Ambulation 0-->independent    Transferring 0-->independent    Toileting 0-->independent    Bathing 0-->independent    Dressing 0-->independent    Eating 0-->independent    Communication 0-->understands/communicates without difficulty    Swallowing 0-->swallows foods/liquids without difficulty    IADL    Medications independent    Meal Preparation independent    Housekeeping independent    Laundry independent    Shopping independent    Oral Care independent    Activity Tolerance    Usual Activity Tolerance good    Current Activity Tolerance moderate    Cognitive/Perceptual/Developmental    Current Mental Status/Cognitive Functioning no deficits noted      Sarita Roth, RN     Denies Will update when bed found See above

## 2024-02-10 ENCOUNTER — APPOINTMENT (OUTPATIENT)
Dept: GENERAL RADIOLOGY | Facility: HOSPITAL | Age: 84
End: 2024-02-10
Payer: MEDICARE

## 2024-02-10 ENCOUNTER — HOSPITAL ENCOUNTER (INPATIENT)
Facility: HOSPITAL | Age: 84
LOS: 1 days | Discharge: HOME | End: 2024-02-14
Attending: EMERGENCY MEDICINE | Admitting: INTERNAL MEDICINE
Payer: MEDICARE
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
## 2024-02-10 DIAGNOSIS — Z99.81 ON HOME OXYGEN THERAPY: ICD-10-CM

## 2024-02-10 DIAGNOSIS — R68.89 DECREASED STRENGTH, ENDURANCE, AND MOBILITY: ICD-10-CM

## 2024-02-10 DIAGNOSIS — J96.11 CHRONIC RESPIRATORY FAILURE WITH HYPOXIA: ICD-10-CM

## 2024-02-10 DIAGNOSIS — J44.1 ACUTE EXACERBATION OF CHRONIC OBSTRUCTIVE PULMONARY DISEASE (COPD): ICD-10-CM

## 2024-02-10 DIAGNOSIS — R53.1 DECREASED STRENGTH, ENDURANCE, AND MOBILITY: ICD-10-CM

## 2024-02-10 DIAGNOSIS — M15.9 PRIMARY OSTEOARTHRITIS INVOLVING MULTIPLE JOINTS: ICD-10-CM

## 2024-02-10 DIAGNOSIS — Z74.09 DECREASED STRENGTH, ENDURANCE, AND MOBILITY: ICD-10-CM

## 2024-02-10 DIAGNOSIS — R06.00 ACUTE DYSPNEA: Primary | ICD-10-CM

## 2024-02-10 LAB
ALBUMIN SERPL-MCNC: 4.3 G/DL (ref 3.5–5.2)
ALBUMIN/GLOB SERPL: 1.4 G/DL
ALP SERPL-CCNC: 72 U/L (ref 39–117)
ALT SERPL W P-5'-P-CCNC: 21 U/L (ref 1–41)
ANION GAP SERPL CALCULATED.3IONS-SCNC: 16.8 MMOL/L (ref 5–15)
AST SERPL-CCNC: 25 U/L (ref 1–40)
BASOPHILS # BLD AUTO: 0.07 10*3/MM3 (ref 0–0.2)
BASOPHILS NFR BLD AUTO: 1 % (ref 0–1.5)
BILIRUB SERPL-MCNC: 0.6 MG/DL (ref 0–1.2)
BUN SERPL-MCNC: 25 MG/DL (ref 8–23)
BUN/CREAT SERPL: 24.5 (ref 7–25)
CALCIUM SPEC-SCNC: 9.1 MG/DL (ref 8.6–10.5)
CHLORIDE SERPL-SCNC: 102 MMOL/L (ref 98–107)
CO2 SERPL-SCNC: 22.2 MMOL/L (ref 22–29)
CREAT SERPL-MCNC: 1.02 MG/DL (ref 0.76–1.27)
D-LACTATE SERPL-SCNC: 1.5 MMOL/L (ref 0.5–2)
DEPRECATED RDW RBC AUTO: 45.3 FL (ref 37–54)
EGFRCR SERPLBLD CKD-EPI 2021: 72 ML/MIN/1.73
EOSINOPHIL # BLD AUTO: 0.49 10*3/MM3 (ref 0–0.4)
EOSINOPHIL NFR BLD AUTO: 7.1 % (ref 0.3–6.2)
ERYTHROCYTE [DISTWIDTH] IN BLOOD BY AUTOMATED COUNT: 14.7 % (ref 12.3–15.4)
GEN 5 2HR TROPONIN T REFLEX: 31 NG/L
GLOBULIN UR ELPH-MCNC: 3.1 GM/DL
GLUCOSE SERPL-MCNC: 104 MG/DL (ref 65–99)
HCT VFR BLD AUTO: 37.1 % (ref 37.5–51)
HGB BLD-MCNC: 11.7 G/DL (ref 13–17.7)
HOLD SPECIMEN: NORMAL
HOLD SPECIMEN: NORMAL
IMM GRANULOCYTES # BLD AUTO: 0.02 10*3/MM3 (ref 0–0.05)
IMM GRANULOCYTES NFR BLD AUTO: 0.3 % (ref 0–0.5)
LYMPHOCYTES # BLD AUTO: 1.7 10*3/MM3 (ref 0.7–3.1)
LYMPHOCYTES NFR BLD AUTO: 24.6 % (ref 19.6–45.3)
MCH RBC QN AUTO: 26.5 PG (ref 26.6–33)
MCHC RBC AUTO-ENTMCNC: 31.5 G/DL (ref 31.5–35.7)
MCV RBC AUTO: 84.1 FL (ref 79–97)
MONOCYTES # BLD AUTO: 0.64 10*3/MM3 (ref 0.1–0.9)
MONOCYTES NFR BLD AUTO: 9.3 % (ref 5–12)
NEUTROPHILS NFR BLD AUTO: 3.99 10*3/MM3 (ref 1.7–7)
NEUTROPHILS NFR BLD AUTO: 57.7 % (ref 42.7–76)
NRBC BLD AUTO-RTO: 0 /100 WBC (ref 0–0.2)
NT-PROBNP SERPL-MCNC: 115 PG/ML (ref 0–1800)
PLATELET # BLD AUTO: 300 10*3/MM3 (ref 140–450)
PMV BLD AUTO: 9 FL (ref 6–12)
POTASSIUM SERPL-SCNC: 4.3 MMOL/L (ref 3.5–5.2)
PROT SERPL-MCNC: 7.4 G/DL (ref 6–8.5)
RBC # BLD AUTO: 4.41 10*6/MM3 (ref 4.14–5.8)
SODIUM SERPL-SCNC: 141 MMOL/L (ref 136–145)
TROPONIN T DELTA: -11 NG/L
TROPONIN T SERPL HS-MCNC: 42 NG/L
WBC NRBC COR # BLD AUTO: 6.91 10*3/MM3 (ref 3.4–10.8)
WHOLE BLOOD HOLD COAG: NORMAL
WHOLE BLOOD HOLD SPECIMEN: NORMAL

## 2024-02-10 PROCEDURE — 25810000003 SODIUM CHLORIDE 0.9 % SOLUTION 250 ML FLEX CONT: Performed by: INTERNAL MEDICINE

## 2024-02-10 PROCEDURE — 94640 AIRWAY INHALATION TREATMENT: CPT

## 2024-02-10 PROCEDURE — G0378 HOSPITAL OBSERVATION PER HR: HCPCS

## 2024-02-10 PROCEDURE — 87040 BLOOD CULTURE FOR BACTERIA: CPT

## 2024-02-10 PROCEDURE — 93010 ELECTROCARDIOGRAM REPORT: CPT | Performed by: INTERNAL MEDICINE

## 2024-02-10 PROCEDURE — 80053 COMPREHEN METABOLIC PANEL: CPT

## 2024-02-10 PROCEDURE — 25010000002 ENOXAPARIN PER 10 MG: Performed by: INTERNAL MEDICINE

## 2024-02-10 PROCEDURE — 99285 EMERGENCY DEPT VISIT HI MDM: CPT

## 2024-02-10 PROCEDURE — 94761 N-INVAS EAR/PLS OXIMETRY MLT: CPT

## 2024-02-10 PROCEDURE — 84484 ASSAY OF TROPONIN QUANT: CPT

## 2024-02-10 PROCEDURE — 85025 COMPLETE CBC W/AUTO DIFF WBC: CPT

## 2024-02-10 PROCEDURE — 83880 ASSAY OF NATRIURETIC PEPTIDE: CPT | Performed by: EMERGENCY MEDICINE

## 2024-02-10 PROCEDURE — 93005 ELECTROCARDIOGRAM TRACING: CPT

## 2024-02-10 PROCEDURE — 25010000002 AZITHROMYCIN PER 500 MG: Performed by: INTERNAL MEDICINE

## 2024-02-10 PROCEDURE — 25010000002 METHYLPREDNISOLONE PER 40 MG: Performed by: INTERNAL MEDICINE

## 2024-02-10 PROCEDURE — 94664 DEMO&/EVAL PT USE INHALER: CPT

## 2024-02-10 PROCEDURE — 36415 COLL VENOUS BLD VENIPUNCTURE: CPT

## 2024-02-10 PROCEDURE — 83605 ASSAY OF LACTIC ACID: CPT

## 2024-02-10 PROCEDURE — 25010000002 METHYLPREDNISOLONE PER 125 MG: Performed by: EMERGENCY MEDICINE

## 2024-02-10 PROCEDURE — 71045 X-RAY EXAM CHEST 1 VIEW: CPT

## 2024-02-10 PROCEDURE — 94799 UNLISTED PULMONARY SVC/PX: CPT

## 2024-02-10 RX ORDER — ONDANSETRON 2 MG/ML
4 INJECTION INTRAMUSCULAR; INTRAVENOUS EVERY 6 HOURS PRN
Status: DISCONTINUED | OUTPATIENT
Start: 2024-02-10 | End: 2024-02-14 | Stop reason: HOSPADM

## 2024-02-10 RX ORDER — BISACODYL 5 MG/1
5 TABLET, DELAYED RELEASE ORAL DAILY PRN
Status: DISCONTINUED | OUTPATIENT
Start: 2024-02-10 | End: 2024-02-14 | Stop reason: HOSPADM

## 2024-02-10 RX ORDER — ONDANSETRON 4 MG/1
4 TABLET, ORALLY DISINTEGRATING ORAL EVERY 6 HOURS PRN
Status: DISCONTINUED | OUTPATIENT
Start: 2024-02-10 | End: 2024-02-14 | Stop reason: HOSPADM

## 2024-02-10 RX ORDER — AMOXICILLIN 250 MG
2 CAPSULE ORAL 2 TIMES DAILY PRN
Status: DISCONTINUED | OUTPATIENT
Start: 2024-02-10 | End: 2024-02-14 | Stop reason: HOSPADM

## 2024-02-10 RX ORDER — IPRATROPIUM BROMIDE AND ALBUTEROL SULFATE 2.5; .5 MG/3ML; MG/3ML
3 SOLUTION RESPIRATORY (INHALATION)
Status: DISCONTINUED | OUTPATIENT
Start: 2024-02-11 | End: 2024-02-14 | Stop reason: HOSPADM

## 2024-02-10 RX ORDER — POLYETHYLENE GLYCOL 3350 17 G/17G
17 POWDER, FOR SOLUTION ORAL DAILY PRN
Status: DISCONTINUED | OUTPATIENT
Start: 2024-02-10 | End: 2024-02-14 | Stop reason: HOSPADM

## 2024-02-10 RX ORDER — ALBUTEROL SULFATE 2.5 MG/3ML
2.5 SOLUTION RESPIRATORY (INHALATION) ONCE
Status: DISCONTINUED | OUTPATIENT
Start: 2024-02-10 | End: 2024-02-10

## 2024-02-10 RX ORDER — ALBUTEROL SULFATE 2.5 MG/3ML
2.5 SOLUTION RESPIRATORY (INHALATION) EVERY 6 HOURS PRN
Status: DISCONTINUED | OUTPATIENT
Start: 2024-02-10 | End: 2024-02-14 | Stop reason: HOSPADM

## 2024-02-10 RX ORDER — BISACODYL 10 MG
10 SUPPOSITORY, RECTAL RECTAL DAILY PRN
Status: DISCONTINUED | OUTPATIENT
Start: 2024-02-10 | End: 2024-02-14 | Stop reason: HOSPADM

## 2024-02-10 RX ORDER — SODIUM CHLORIDE 0.9 % (FLUSH) 0.9 %
10 SYRINGE (ML) INJECTION AS NEEDED
Status: DISCONTINUED | OUTPATIENT
Start: 2024-02-10 | End: 2024-02-14 | Stop reason: HOSPADM

## 2024-02-10 RX ORDER — IPRATROPIUM BROMIDE AND ALBUTEROL SULFATE 2.5; .5 MG/3ML; MG/3ML
3 SOLUTION RESPIRATORY (INHALATION) ONCE
Status: COMPLETED | OUTPATIENT
Start: 2024-02-10 | End: 2024-02-10

## 2024-02-10 RX ORDER — METHYLPREDNISOLONE SODIUM SUCCINATE 125 MG/2ML
125 INJECTION, POWDER, LYOPHILIZED, FOR SOLUTION INTRAMUSCULAR; INTRAVENOUS ONCE
Status: COMPLETED | OUTPATIENT
Start: 2024-02-10 | End: 2024-02-10

## 2024-02-10 RX ORDER — ENOXAPARIN SODIUM 100 MG/ML
40 INJECTION SUBCUTANEOUS NIGHTLY
Status: DISCONTINUED | OUTPATIENT
Start: 2024-02-10 | End: 2024-02-14 | Stop reason: HOSPADM

## 2024-02-10 RX ORDER — ACETAMINOPHEN 325 MG/1
650 TABLET ORAL EVERY 4 HOURS PRN
Status: DISCONTINUED | OUTPATIENT
Start: 2024-02-10 | End: 2024-02-14 | Stop reason: HOSPADM

## 2024-02-10 RX ORDER — UREA 10 %
3 LOTION (ML) TOPICAL NIGHTLY PRN
Status: DISCONTINUED | OUTPATIENT
Start: 2024-02-10 | End: 2024-02-14 | Stop reason: HOSPADM

## 2024-02-10 RX ORDER — METHYLPREDNISOLONE SODIUM SUCCINATE 40 MG/ML
40 INJECTION, POWDER, LYOPHILIZED, FOR SOLUTION INTRAMUSCULAR; INTRAVENOUS EVERY 8 HOURS
Status: DISCONTINUED | OUTPATIENT
Start: 2024-02-11 | End: 2024-02-12

## 2024-02-10 RX ADMIN — AZITHROMYCIN MONOHYDRATE 500 MG: 500 INJECTION, POWDER, LYOPHILIZED, FOR SOLUTION INTRAVENOUS at 23:30

## 2024-02-10 RX ADMIN — ENOXAPARIN SODIUM 40 MG: 100 INJECTION SUBCUTANEOUS at 22:22

## 2024-02-10 RX ADMIN — METHYLPREDNISOLONE SODIUM SUCCINATE 125 MG: 125 INJECTION, POWDER, FOR SOLUTION INTRAMUSCULAR; INTRAVENOUS at 16:57

## 2024-02-10 RX ADMIN — IPRATROPIUM BROMIDE AND ALBUTEROL SULFATE 3 ML: .5; 3 SOLUTION RESPIRATORY (INHALATION) at 16:50

## 2024-02-10 RX ADMIN — METHYLPREDNISOLONE SODIUM SUCCINATE 40 MG: 40 INJECTION, POWDER, FOR SOLUTION INTRAMUSCULAR; INTRAVENOUS at 23:38

## 2024-02-10 NOTE — ED PROVIDER NOTES
EMERGENCY DEPARTMENT ENCOUNTER    Room Number:  E456/1  PCP: Harpreet Kate MD  Historian: Patient, EMS      HPI:  Chief Complaint: Shortness of breath, cough  A complete HPI/ROS/PMH/PSH/SH/FH are unobtainable due to: Nothing  Context: Tony Leonard is a 83 y.o. male with a medical history of COPD and chronic respiratory failure who presents to the ED by EMS c/o acute shortness of breath.  Patient reports increased shortness of breath for the past few days.  Symptoms became much worse several hours ago.  Patient was coughing and wheezing.  He used 2 nebulizer treatments at home without relief.  He was given 2 additional treatments by EMS.  He is normally on 2 L of oxygen at nighttime.  He was placed on 3 L by EMS.  Cough is productive of clear sputum.  He also has had several episodes of watery nonbloody diarrhea since around noon today.  He denies fever, chills, sore throat, chest pain, abdominal pain, or vomiting.  Patient was seen here in the ED 4 days ago for similar symptoms.            PAST MEDICAL HISTORY  Active Ambulatory Problems     Diagnosis Date Noted    Thrush, oral 03/11/2016    ADD (attention deficit disorder) 03/11/2016    Hemorrhoids 03/11/2016    Bronchiectasis with acute lower respiratory infection 03/11/2016    Diverticul disease small and large intestine, no perforati or abscess 03/11/2016    Benign non-nodular prostatic hyperplasia without lower urinary tract symptoms 03/11/2016    Gastroesophageal reflux disease 03/11/2016    Malaise and fatigue 03/11/2016    Environmental allergies 04/25/2016    Hemoptysis 04/27/2016    Dyslipidemia 05/11/2016    Primary osteoarthritis involving multiple joints 05/11/2016    Pneumonia of right lower lobe due to infectious organism 10/03/2016    Abnormal EKG 10/04/2016    COPD (chronic obstructive pulmonary disease) 10/04/2016    Mild malnutrition 03/28/2017    Acute on chronic respiratory failure with hypoxia 04/27/2017    Fracture, intertrochanteric,  right femur, closed, initial encounter 01/16/2018    Chronic diastolic CHF (congestive heart failure) 01/16/2018    Hematoma of frontal scalp 01/16/2018    Postoperative anemia due to acute blood loss 01/19/2018    Urinary retention 01/25/2018    Hemorrhagic disorder due to circulating anticoagulants 01/29/2018    History of fracture of right hip 02/22/2018    Closed fracture of right hip with routine healing 02/28/2018    Chronic low back pain with sciatica 06/21/2018    Change in bowel function 04/18/2019    Rectal bleeding 04/18/2019    Prostate cancer 04/22/2019    On home oxygen therapy 09/24/2019    Acute viral bronchitis 12/29/2019    Peripheral neuropathy 07/01/2021    Plantar fasciitis 09/08/2021    HTN (hypertension) 05/06/2022    COPD exacerbation 05/07/2022    Bilateral lower extremity edema 05/07/2022    Microscopic hematuria 05/08/2022    Acute midline low back pain with right-sided sciatica 08/01/2022    Spinal stenosis, lumbar region with neurogenic claudication 08/02/2022    Compression deformity of vertebra 08/02/2022    Rectal bleed 09/23/2022    Esophagitis 09/24/2022    Angiectasia 09/27/2022    Hiatal hernia 09/27/2022    Overweight (BMI 25.0-29.9) 11/14/2022    Leukocytosis 11/15/2022    Bilateral hip pain 11/21/2022    Elevated troponin level not due myocardial infarction 12/10/2022    Nocturnal hypoxia 12/10/2022    Pneumonia of right upper lobe due to infectious organism 12/29/2022    NSTEMI (non-ST elevated myocardial infarction) 01/29/2023    Chronic respiratory failure with hypoxia 01/29/2023    Paroxysmal atrial fibrillation 02/17/2023    Acute exacerbation of chronic obstructive pulmonary disease (COPD) 05/10/2023    Anemia 05/10/2023    Non-compliant patient 05/11/2023    Hypokalemia 10/01/2020    Spondylolisthesis of lumbar region 08/14/2018    Elevated troponin 12/22/2023    Rhabdomyolysis 12/26/2023    Multiple contusions 12/26/2023    Fall 12/26/2023     Resolved Ambulatory  Problems     Diagnosis Date Noted    Pneumonia of both lower lobes due to infectious organism 03/11/2016    Pneumonia of right upper lobe due to infectious organism 04/22/2016    COPD exacerbation 04/25/2016    GERD (gastroesophageal reflux disease) 04/25/2016    Chronic respiratory failure with hypoxia 10/04/2016    Bacterial lobar pneumonia 12/27/2016    Pneumonia of left lower lobe due to infectious organism 03/26/2017    Bronchitis 06/01/2017    COPD exacerbation 06/17/2017    Leukocytosis 01/16/2018    Right upper lobe pneumonia 01/20/2018    Mucus plugging of bronchi 01/16/2018    COPD exacerbation 04/16/2018    Degenerative joint disease (DJD) of hip 09/23/2019    Bronchiectasis with (acute) exacerbation 12/28/2019    Septic shock 11/26/2022    Acute saddle pulmonary embolism without acute cor pulmonale  - 12/11/2022 12/11/2022     Past Medical History:   Diagnosis Date    ADHD (attention deficit hyperactivity disorder)     Bronchiectasis     CHF (congestive heart failure)     Colon polyp     Diverticulosis     Hard of hearing     Pneumonia          PAST SURGICAL HISTORY  Past Surgical History:   Procedure Laterality Date    BRONCHOSCOPY N/A 4/30/2016    Procedure: BRONCHOSCOPY with BAL of right lower lobe and left  lower lobe.  ;  Surgeon: Nando Diaz MD;  Location: [facility] ENDOSCOPY;  Service:     BRONCHOSCOPY N/A 4/28/2017    Procedure: BRONCHOSCOPY;  Surgeon: Katharina Salter MD;  Location: [facility] ENDOSCOPY;  Service:     BRONCHOSCOPY Bilateral 6/29/2017    Procedure: BRONCHOSCOPY WITH WASHINGS;  Surgeon: Katharina Salter MD;  Location: [facility] ENDOSCOPY;  Service:     BRONCHOSCOPY N/A 1/22/2018    Procedure: BRONCHOSCOPY AT BEDSIDE with BAL;  Surgeon: Katharina Salter MD;  Location: [facility] ENDOSCOPY;  Service:     BRONCHOSCOPY N/A 1/30/2018    Procedure: BRONCHOSCOPY with BAL and washing;  Surgeon: Shreyas Wild MD;  Location: [facility] ENDOSCOPY;  Service:     BRONCHOSCOPY Bilateral 4/24/2018    Procedure:  BRONCHOSCOPY WITH WASHING;  Surgeon: Katharina Salter MD;  Location: [facility] ENDOSCOPY;  Service: Pulmonary    BRONCHOSCOPY N/A 12/28/2019    Procedure: BRONCHOSCOPY with bilateral washing;  Surgeon: Katharina Salter MD;  Location: [facility] ENDOSCOPY;  Service: Pulmonary    BRONCHOSCOPY Bilateral 1/4/2023    Procedure: BRONCHOSCOPY with lavage;  Surgeon: Katharina Salter MD;  Location: [facility] ENDOSCOPY;  Service: Pulmonary;  Laterality: Bilateral;  mucus plugging    CARDIAC CATHETERIZATION N/A 1/29/2023    Procedure: Left Heart Cath;  Surgeon: Johnnie Ang MD;  Location: [facility] CATH INVASIVE LOCATION;  Service: Cardiology;  Laterality: N/A;    CARDIAC CATHETERIZATION N/A 1/29/2023    Procedure: Coronary angiography;  Surgeon: Johnnie Ang MD;  Location: [facility] CATH INVASIVE LOCATION;  Service: Cardiology;  Laterality: N/A;    COLONOSCOPY  05/17/2013    eh, ih, tort, sig tics    COLONOSCOPY N/A 5/10/2019    Non-thrombosed external hemorrhoids found on perianal exam, Diverticulosis, Tortuous colon, One 5 mm polyp in the mid ascending colon, IH. Path: Tubular adenoma.     COLONOSCOPY N/A 9/24/2022    Procedure: COLONOSCOPY to cecum and TI with argon plasma coagulation.;  Surgeon: Bruce Black MD;  Location: [facility] ENDOSCOPY;  Service: Gastroenterology;  Laterality: N/A;  pre- GI bleeding  post- radiation proctitis, diverticulosis    ENDOSCOPY  03/19/2015    z line irreg, hh    ENDOSCOPY N/A 9/24/2022    Procedure: ESOPHAGOGASTRODUODENOSCOPY;  Surgeon: Bruce Black MD;  Location: [facility] ENDOSCOPY;  Service: Gastroenterology;  Laterality: N/A;  pre- GI bleeding  post- esophagitis, hiatal hernia    HIP OPEN REDUCTION Right 1/17/2018    Procedure: HIP OPEN REDUCTION INTERNAL FIXATION WITH DYNAMIC HIP SCREW;  Surgeon: Les Black MD;  Location: [facility] MAIN OR;  Service:     SPINE SURGERY      TONSILLECTOMY      TOTAL HIP ARTHROPLASTY REVISION Right 9/23/2019    Procedure: HIP  REVISION RIGHT;   Surgeon: Isauro Warner MD;  Location: [facility] OR;  Service: Orthopedics         FAMILY HISTORY  Family History   Problem Relation Age of Onset    Thyroid disease Mother     No Known Problems Father     Malig Hyperthermia Neg Hx          SOCIAL HISTORY  Social History     Socioeconomic History    Marital status: Single   Tobacco Use    Smoking status: Never    Smokeless tobacco: Never   Vaping Use    Vaping Use: Never used   Substance and Sexual Activity    Alcohol use: No     Comment: red wine occassional    Drug use: No    Sexual activity: Defer         ALLERGIES  Daliresp [roflumilast], Latex, and Sulfa antibiotics    REVIEW OF SYSTEMS  Review of Systems  Included in HPI  All systems reviewed and negative except for those discussed in HPI.      PHYSICAL EXAM  ED Triage Vitals [02/10/24 1543]   Temp Heart Rate Resp BP SpO2   98 °F (36.7 °C) 105 24 149/77 95 %      Temp src Heart Rate Source Patient Position BP Location FiO2 (%)   Tympanic Monitor -- -- --       Physical Exam      GENERAL: Awake, alert, oriented x 3.  Chronically ill but nontoxic-appearing elderly male.  Appears dyspneic  HENT: NCAT, nares patent  EYES: no scleral icterus  CV: regular rhythm, tachycardic  RESPIRATORY: Mildly tachypneic, bilateral expiratory wheezes, decreased air movement in both lung bases  ABDOMEN: soft, nondistended, nontender  MUSCULOSKELETAL: Extremities are nontender with full range of motion.  No calf tenderness.  Trace edema in both lower legs  NEURO: Speech is normal.  No facial droop.  Follows commands  PSYCH:  calm, cooperative  SKIN: warm, dry    Vital signs and nursing notes reviewed.          LAB RESULTS  Recent Results (from the past 24 hour(s))   ECG 12 Lead Dyspnea    Collection Time: 02/10/24  3:59 PM   Result Value Ref Range    QT Interval 354 ms    QTC Interval 455 ms   Comprehensive Metabolic Panel    Collection Time: 02/10/24  4:17 PM    Specimen: Blood   Result Value Ref Range    Glucose 104 (H) 65 -  99 mg/dL    BUN 25 (H) 8 - 23 mg/dL    Creatinine 1.02 0.76 - 1.27 mg/dL    Sodium 141 136 - 145 mmol/L    Potassium 4.3 3.5 - 5.2 mmol/L    Chloride 102 98 - 107 mmol/L    CO2 22.2 22.0 - 29.0 mmol/L    Calcium 9.1 8.6 - 10.5 mg/dL    Total Protein 7.4 6.0 - 8.5 g/dL    Albumin 4.3 3.5 - 5.2 g/dL    ALT (SGPT) 21 1 - 41 U/L    AST (SGOT) 25 1 - 40 U/L    Alkaline Phosphatase 72 39 - 117 U/L    Total Bilirubin 0.6 0.0 - 1.2 mg/dL    Globulin 3.1 gm/dL    A/G Ratio 1.4 g/dL    BUN/Creatinine Ratio 24.5 7.0 - 25.0    Anion Gap 16.8 (H) 5.0 - 15.0 mmol/L    eGFR 72.0 >60.0 mL/min/1.73   Lactic Acid, Plasma    Collection Time: 02/10/24  4:17 PM    Specimen: Blood   Result Value Ref Range    Lactate 1.5 0.5 - 2.0 mmol/L   High Sensitivity Troponin T    Collection Time: 02/10/24  4:17 PM    Specimen: Blood   Result Value Ref Range    HS Troponin T 42 (H) <22 ng/L   CBC Auto Differential    Collection Time: 02/10/24  4:17 PM    Specimen: Blood   Result Value Ref Range    WBC 6.91 3.40 - 10.80 10*3/mm3    RBC 4.41 4.14 - 5.80 10*6/mm3    Hemoglobin 11.7 (L) 13.0 - 17.7 g/dL    Hematocrit 37.1 (L) 37.5 - 51.0 %    MCV 84.1 79.0 - 97.0 fL    MCH 26.5 (L) 26.6 - 33.0 pg    MCHC 31.5 31.5 - 35.7 g/dL    RDW 14.7 12.3 - 15.4 %    RDW-SD 45.3 37.0 - 54.0 fl    MPV 9.0 6.0 - 12.0 fL    Platelets 300 140 - 450 10*3/mm3    Neutrophil % 57.7 42.7 - 76.0 %    Lymphocyte % 24.6 19.6 - 45.3 %    Monocyte % 9.3 5.0 - 12.0 %    Eosinophil % 7.1 (H) 0.3 - 6.2 %    Basophil % 1.0 0.0 - 1.5 %    Immature Grans % 0.3 0.0 - 0.5 %    Neutrophils, Absolute 3.99 1.70 - 7.00 10*3/mm3    Lymphocytes, Absolute 1.70 0.70 - 3.10 10*3/mm3    Monocytes, Absolute 0.64 0.10 - 0.90 10*3/mm3    Eosinophils, Absolute 0.49 (H) 0.00 - 0.40 10*3/mm3    Basophils, Absolute 0.07 0.00 - 0.20 10*3/mm3    Immature Grans, Absolute 0.02 0.00 - 0.05 10*3/mm3    nRBC 0.0 0.0 - 0.2 /100 WBC   Green Top (Gel)    Collection Time: 02/10/24  4:17 PM   Result Value Ref Range     Extra Tube Hold for add-ons.    Lavender Top    Collection Time: 02/10/24  4:17 PM   Result Value Ref Range    Extra Tube hold for add-on    Gold Top - SST    Collection Time: 02/10/24  4:17 PM   Result Value Ref Range    Extra Tube Hold for add-ons.    Light Blue Top    Collection Time: 02/10/24  4:17 PM   Result Value Ref Range    Extra Tube Hold for add-ons.    BNP    Collection Time: 02/10/24  4:17 PM    Specimen: Blood   Result Value Ref Range    proBNP 115.0 0.0 - 1,800.0 pg/mL   High Sensitivity Troponin T 2Hr    Collection Time: 02/10/24  6:46 PM    Specimen: Arm, Right; Blood   Result Value Ref Range    HS Troponin T 31 (H) <22 ng/L    Troponin T Delta -11 (L) >=-4 - <+4 ng/L       Ordered the above labs and reviewed the results.        RADIOLOGY  XR Chest 1 View    Result Date: 2/10/2024  XR CHEST 1 VW-  Clinical: COPD, cough  COMPARISON examination dated 2/6/2024  FINDINGS: There has been some clearing of the vague right lung infiltrate within the interim. The cardiomediastinal silhouette is stable. No vascular congestion or new airspace disease has developed. No gross pleural effusion seen. The remainder is unremarkable.  This report was finalized on 2/10/2024 5:31 PM by Dr. Richard Solitario M.D on Workstation: [id number]       Ordered the above noted radiological studies. Reviewed by me in PACS.            PROCEDURES  Procedures        OUTPATIENT MEDICATION MANAGEMENT:  Current Facility-Administered Medications Ordered in Epic   Medication Dose Route Frequency Provider Last Rate Last Admin    acetaminophen (TYLENOL) tablet 650 mg  650 mg Oral Q4H PRN Elizabet Parra MD        albuterol (PROVENTIL) nebulizer solution 0.083% 2.5 mg/3mL  2.5 mg Nebulization Q6H PRN Elizabet Parra MD        azithromycin (ZITHROMAX) 500 mg in sodium chloride 0.9 % 250 mL IVPB-VTB  500 mg Intravenous Q24H Elizabet Parra MD        sennosides-docusate (PERICOLACE) 8.6-50 MG per tablet 2 tablet  2 tablet  Oral BID PRN Stingl, Elizabet Morales MD        And    polyethylene glycol (MIRALAX) packet 17 g  17 g Oral Daily PRN Stinglay, Elizabet Morales MD        And    bisacodyl (DULCOLAX) EC tablet 5 mg  5 mg Oral Daily PRN Stinglay, Elizabet Morales MD        And    bisacodyl (DULCOLAX) suppository 10 mg  10 mg Rectal Daily PRN Stingl, Elizabet Morales MD        Enoxaparin Sodium (LOVENOX) syringe 40 mg  40 mg Subcutaneous Nightly Stinglay, Elizabet Morales MD        [START ON 2/11/2024] ipratropium-albuterol (DUO-NEB) nebulizer solution 3 mL  3 mL Nebulization Q6H While Awake - RT Elizabet Parra MD        melatonin tablet 3 mg  3 mg Oral Nightly PRN Stinglay, Elizabet Morales MD        [START ON 2/11/2024] methylPREDNISolone sodium succinate (SOLU-Medrol) injection 40 mg  40 mg Intravenous Q8H StingElizabet chun MD        ondansetron ODT (ZOFRAN-ODT) disintegrating tablet 4 mg  4 mg Oral Q6H PRN Elizabet Parra MD        Or    ondansetron (ZOFRAN) injection 4 mg  4 mg Intravenous Q6H PRN Stinglay, Elizabet Morales MD        sodium chloride 0.9 % flush 10 mL  10 mL Intravenous PRN Emergency, Triage Protocol, MD         No current Epic-ordered outpatient medications on file.           MEDICATIONS GIVEN IN ER  Medications   sodium chloride 0.9 % flush 10 mL (has no administration in time range)   acetaminophen (TYLENOL) tablet 650 mg (has no administration in time range)   ondansetron ODT (ZOFRAN-ODT) disintegrating tablet 4 mg (has no administration in time range)     Or   ondansetron (ZOFRAN) injection 4 mg (has no administration in time range)   melatonin tablet 3 mg (has no administration in time range)   sennosides-docusate (PERICOLACE) 8.6-50 MG per tablet 2 tablet (has no administration in time range)     And   polyethylene glycol (MIRALAX) packet 17 g (has no administration in time range)     And   bisacodyl (DULCOLAX) EC tablet 5 mg (has no administration in time range)     And   bisacodyl (DULCOLAX) suppository 10 mg  (has no administration in time range)   methylPREDNISolone sodium succinate (SOLU-Medrol) injection 40 mg (has no administration in time range)   ipratropium-albuterol (DUO-NEB) nebulizer solution 3 mL (has no administration in time range)   albuterol (PROVENTIL) nebulizer solution 0.083% 2.5 mg/3mL (has no administration in time range)   Enoxaparin Sodium (LOVENOX) syringe 40 mg (has no administration in time range)   azithromycin (ZITHROMAX) 500 mg in sodium chloride 0.9 % 250 mL IVPB-VTB (has no administration in time range)   ipratropium-albuterol (DUO-NEB) nebulizer solution 3 mL (3 mL Nebulization Given 2/10/24 1650)   methylPREDNISolone sodium succinate (SOLU-Medrol) injection 125 mg (125 mg Intravenous Given 2/10/24 1657)                   MEDICAL DECISION MAKING, PROGRESS, and CONSULTS    All labs have been independently reviewed by me.  All radiology studies have been reviewed by me and I have also reviewed the radiology report.   EKG's independently viewed and interpreted by me.  Discussion below represents my analysis of pertinent findings related to patient's condition, differential diagnosis, treatment plan and final disposition.      Additional sources:    - Discussed/ obtained information from independent historians: EMS    -Prescription drug monitoring program review:     N/A      - External (non-ED) record review: Patient was last admitted here in December 2023 for rhabdomyolysis that occurred after he fell and laid on the ground for several hours.  He was discharged to a rehab facility.  Echocardiogram done last month showed an EF of 60%.  LV diastolic function was normal.  Patient was seen here in the ED 4 days ago for shortness of breath.  Chest x-ray showed new areas of abnormal density in the right lung suspected to represent pneumonia.  Respiratory panel was negative.    - Chronic or social conditions impacting patient care (Social Determinants of Health): None          Orders placed during  this visit:  Orders Placed This Encounter   Procedures    Blood Culture - Blood,    Blood Culture - Blood,    XR Chest 1 View    Comprehensive Metabolic Panel    Lactic Acid, Plasma    [place] Draw    High Sensitivity Troponin T    CBC Auto Differential    BNP    High Sensitivity Troponin T 2Hr    Basic Metabolic Panel    CBC (No Diff)    Diet: Cardiac Diets; Healthy Heart (2-3 Na+); Texture: Regular Texture (IDDSI 7); Fluid Consistency: Thin (IDDSI 0)    Undress & Gown    Continuous Pulse Oximetry    Vital Signs    Vital Signs    Up with assistance    Daily Weights    Strict Intake & Output    Oral Care    Code Status and Medical Interventions:    LHA (on-call MD unless specified) Details    Inpatient Pulmonology Consult    Discontinue Patient Isolation    Oxygen Therapy- Nasal Cannula; Titrate 1-6 LPM Per SpO2; 90 - 95%    ECG 12 Lead Dyspnea    Insert Peripheral IV    Initiate Observation Status    CBC & Differential    Green Top (Gel)    Lavender Top    Gold Top - SST    Light Blue Top         Additional orders considered but not ordered:          Differential diagnosis includes, but is not limited to:    Differential diagnosis includes but is not limited to CHF, acute coronary syndrome, pulmonary embolism, pneumothorax, pneumonia, asthma/COPD, deconditioning, anemia, anxiety.         Independent interpretation of labs, radiology studies, and discussions with consultants:  ED Course as of 02/10/24 2054   Sat Feb 10, 2024   1633 EKG personally interpreted by me at 1615.  My personal interpretation is:         EKG time: 1559  Rhythm/Rate: Sinus rhythm, rate 99  P waves and TN: Normal  QRS, axis: Normal axis, early transition  ST and T waves: Nonspecific ST/T wave changes in the lateral leads    Interpreted Contemporaneously by me, independently viewed  EKG is not significantly changed compared to prior EKG done 2/6/2024   []   1658 HS Troponin T(!): 42  31 four days ago []   1658 Lactate: 1.5 []   1658 WBC:  6.91 [WH]   1658 Hemoglobin(!): 11.7  Stable [WH]   1658 BUN(!): 25 [WH]   1658 Creatinine: 1.02 [WH]   1725 proBNP: 115.0 [WH]   1726 Chest x-ray personally interpreted by me.  My personal trepidation is: Heart size is normal.  There are increased markings in the right perihilar region.  No pleural effusion. [WH]   1757 Patient is feeling better after DuoNeb treatment.  Test results were discussed with him.  On reexam, there is still some scattered wheezes.  Patient has had a total of 5 treatments (between home, EMS, and here in the ED)..  Shared decision making was discussed and admission was recommended.  Patient is agreeable. [WH]   1814 Per the radiologist, chest x-ray shows some clearing of the vague right lung infiltrate since 3 done on 2/6/2024.  There is no vascular congestion or new airspace disease. [WH]   1828 Case discussed with Dr. Parra and she agrees to admit the patient to a monitored bed.  Pertinent history, exam findings, test results, ED course, and diagnoses were discussed with her. [WH]   1855 MDM: Patient presented to the ED complaining of shortness of breath and wheezing.  He had 4 nebulizer treatments before coming to the ED on initial exam, he was still wheezing.  Chest x-ray was negative acute.  Labs were unremarkable except for troponin of 42.  Repeat had a delta of -11.  EKG did not have any acute ischemic changes.  Respiratory panel done several days ago was negative.  Patient was given nebulizer treatment and IV Solu-Medrol.  Symptoms improved but did not resolve.  He is on 2 L of oxygen primarily at night.  O2 sats were in the mid upper 90s on 3 L.  Patient will be admitted to the hospitalist. [WH]      ED Course User Index  [WH] Davian Haider MD         COMPLEXITY OF CARE  The patient requires admission.      DIAGNOSIS  Final diagnoses:   Acute dyspnea   Acute exacerbation of chronic obstructive pulmonary disease (COPD)   Chronic respiratory failure with hypoxia          DISPOSITION  ADMISSION    Discussed treatment plan and reason for admission with pt/family and admitting physician.  Pt/family voiced understanding of the plan for admission for further testing/treatment as needed.               Latest Documented Vital Signs:  AS OF 20:54 EST VITALS:    BP - 152/92  HR - 95  TEMP - 98 °F (36.7 °C) (Tympanic)  O2 SATS - 99%            --    Please note that portions of this were completed with a voice recognition program.       Note Disclaimer: At [facility], we believe that sharing information builds trust and better relationships. You are receiving this note because you are receiving care at [facility] or recently visited. It is possible you will see health information before a provider has talked with you about it. This kind of information can be easy to misunderstand. To help you fully understand what it means for your health, we urge you to discuss this note with your provider.             Davian Haider MD  02/10/24 2054

## 2024-02-10 NOTE — ED NOTES
Pt arrives via ems from assisted living at [facility] for shortness of breath. Pt has a congested cough as well. Pt normally wears 2L but is on 3L at triage. Ems gave pt 2 albuterol treatments. Pt has COPD.

## 2024-02-10 NOTE — ED NOTES
Nursing report ED to floor  Tony Leonard  83 y.o.  male    HPI :   Chief Complaint   Patient presents with    Shortness of Breath    Cough       Admitting doctor:   Elizabet Parra MD    Admitting diagnosis:   There were no encounter diagnoses.    Code status:   Current Code Status       Date Active Code Status Order ID Comments User Context       Prior            Allergies:   Daliresp [roflumilast], Latex, and Sulfa antibiotics    Isolation:   Enhanced Droplet/Contact     Intake and Output  No intake or output data in the 24 hours ending 02/10/24 1834    Weight:       02/10/24  1543   Weight: 85.7 kg (189 lb)       Most recent vitals:   Vitals:    02/10/24 1731 02/10/24 1801 02/10/24 1802 02/10/24 1831   BP: 140/77 151/82  148/80   Pulse: 89 90 90 90   Resp:       Temp:       TempSrc:       SpO2:  96% 96% 97%   Weight:       Height:           Active LDAs/IV Access:   Lines, Drains & Airways       Active LDAs       Name Placement date Placement time Site Days    Peripheral IV 02/10/24 1618 Anterior;Right Wrist 02/10/24  1618  Wrist  less than 1                    Labs (abnormal labs have a star):   Labs Reviewed   COMPREHENSIVE METABOLIC PANEL - Abnormal; Notable for the following components:       Result Value    Glucose 104 (*)     BUN 25 (*)     Anion Gap 16.8 (*)     All other components within normal limits    Narrative:     GFR Normal >60  Chronic Kidney Disease <60  Kidney Failure <15    The GFR formula is only valid for adults with stable renal function between ages 18 and 70.   TROPONIN - Abnormal; Notable for the following components:    HS Troponin T 42 (*)     All other components within normal limits    Narrative:     High Sensitive Troponin T Reference Range:  <14.0 ng/L- Negative Female for AMI  <22.0 ng/L- Negative Male for AMI  >=14 - Abnormal Female indicating possible myocardial injury.  >=22 - Abnormal Male indicating possible myocardial injury.   Clinicians would have to utilize  clinical acumen, EKG, Troponin, and serial changes to determine if it is an Acute Myocardial Infarction or myocardial injury due to an underlying chronic condition.        CBC WITH AUTO DIFFERENTIAL - Abnormal; Notable for the following components:    Hemoglobin 11.7 (*)     Hematocrit 37.1 (*)     MCH 26.5 (*)     Eosinophil % 7.1 (*)     Eosinophils, Absolute 0.49 (*)     All other components within normal limits   LACTIC ACID, PLASMA - Normal   BNP (IN-HOUSE) - Normal    Narrative:     This assay is used as an aid in the diagnosis of individuals suspected of having heart failure. It can be used as an aid in the diagnosis of acute decompensated heart failure (ADHF) in patients presenting with signs and symptoms of ADHF to the emergency department (ED). In addition, NT-proBNP of <300 pg/mL indicates ADHF is not likely.    Age Range Result Interpretation  NT-proBNP Concentration (pg/mL:      <50             Positive            >450                   Gray                 300-450                    Negative             <300    50-75           Positive            >900                  Gray                300-900                  Negative            <300      >75             Positive            >1800                  Gray                300-1800                  Negative            <300   BLOOD CULTURE   BLOOD CULTURE   RAINBOW DRAW    Narrative:     The following orders were created for panel order [place] Draw.  Procedure                               Abnormality         Status                     ---------                               -----------         ------                     Green Top (Gel)[[id number]]                                  Final result               Lavender Top[[id number]]                                     Final result               Gold Top - SST[[id number]]                                   Final result               Light Blue Top[[id number]]                                   Final result                  Please view results for these tests on the individual orders.   HIGH SENSITIVITIY TROPONIN T 2HR   CBC AND DIFFERENTIAL    Narrative:     The following orders were created for panel order CBC & Differential.  Procedure                               Abnormality         Status                     ---------                               -----------         ------                     CBC Auto Differential[[id number]]        Abnormal            Final result                 Please view results for these tests on the individual orders.   GREEN TOP   LAVENDER TOP   GOLD TOP - SST   LIGHT BLUE TOP       EKG:   ECG 12 Lead Dyspnea   Preliminary Result   HEART RATE= 99  bpm   RR Interval= 606  ms   [place] Interval= 178  ms   P Horizontal Axis= 5  deg   P Front Axis= 61  deg   QRSD Interval= 78  ms   QT Interval= 354  ms   QTcB= 455  ms   QRS Axis= 32  deg   T Wave Axis= 71  deg   - OTHERWISE NORMAL ECG -   Sinus rhythm   Abnormal R-wave progression, early transition   Baseline wander in lead(s) V2,V6   Electronically Signed By:    Date and Time of Study: 2024-02-10 15:59:03          Meds given in ED:   Medications   sodium chloride 0.9 % flush 10 mL (has no administration in time range)   ipratropium-albuterol (DUO-NEB) nebulizer solution 3 mL (3 mL Nebulization Given 2/10/24 1650)   methylPREDNISolone sodium succinate (SOLU-Medrol) injection 125 mg (125 mg Intravenous Given 2/10/24 1657)       Imaging results:  No radiology results for the last day    Ambulatory status:   - assist    Social issues:   Social History     Socioeconomic History    Marital status: Single   Tobacco Use    Smoking status: Never    Smokeless tobacco: Never   Vaping Use    Vaping Use: Never used   Substance and Sexual Activity    Alcohol use: No     Comment: red wine occassional    Drug use: No    Sexual activity: Defer       NIH Stroke Scale:         Chelsea So RN  02/10/24 18:34 EST

## 2024-02-10 NOTE — PROGRESS NOTES
"[facility] Clinical Pharmacy Services: Enoxaparin Consult    Tony Leonard has a pharmacy consult to dose prophylactic enoxaparin per Dr. Parra's request.     Indication: VTE Prophylaxis  Home Anticoagulation: none     Relevant clinical data and objective history reviewed:  83 y.o. male 180.3 cm (71\") 85.7 kg (189 lb)   Body mass index is 26.36 kg/m².   Results from last 7 days   Lab Units 02/10/24  1617   PLATELETS 10*3/mm3 300     Estimated Creatinine Clearance: 64.2 mL/min (by C-G formula based on SCr of 1.02 mg/dL).    Assessment/Plan    Will start patient on 40mg subcutaneous every 24 hours, adjusted for renal function. Consult order will be discontinued but pharmacy will continue to follow.     Shawna Davis [facility]  Clinical Pharmacist    "

## 2024-02-11 LAB
ANION GAP SERPL CALCULATED.3IONS-SCNC: 18 MMOL/L (ref 5–15)
BUN SERPL-MCNC: 23 MG/DL (ref 8–23)
BUN/CREAT SERPL: 25.6 (ref 7–25)
CALCIUM SPEC-SCNC: 8.8 MG/DL (ref 8.6–10.5)
CHLORIDE SERPL-SCNC: 103 MMOL/L (ref 98–107)
CO2 SERPL-SCNC: 19 MMOL/L (ref 22–29)
CREAT SERPL-MCNC: 0.9 MG/DL (ref 0.76–1.27)
DEPRECATED RDW RBC AUTO: 43 FL (ref 37–54)
EGFRCR SERPLBLD CKD-EPI 2021: 83.7 ML/MIN/1.73
ERYTHROCYTE [DISTWIDTH] IN BLOOD BY AUTOMATED COUNT: 14.5 % (ref 12.3–15.4)
GLUCOSE SERPL-MCNC: 119 MG/DL (ref 65–99)
HCT VFR BLD AUTO: 33.2 % (ref 37.5–51)
HGB BLD-MCNC: 10.6 G/DL (ref 13–17.7)
MCH RBC QN AUTO: 26.2 PG (ref 26.6–33)
MCHC RBC AUTO-ENTMCNC: 31.9 G/DL (ref 31.5–35.7)
MCV RBC AUTO: 82.2 FL (ref 79–97)
PLATELET # BLD AUTO: 265 10*3/MM3 (ref 140–450)
PMV BLD AUTO: 9.9 FL (ref 6–12)
POTASSIUM SERPL-SCNC: 4.6 MMOL/L (ref 3.5–5.2)
QT INTERVAL: 354 MS
QTC INTERVAL: 455 MS
RBC # BLD AUTO: 4.04 10*6/MM3 (ref 4.14–5.8)
SODIUM SERPL-SCNC: 140 MMOL/L (ref 136–145)
WBC NRBC COR # BLD AUTO: 3.22 10*3/MM3 (ref 3.4–10.8)

## 2024-02-11 PROCEDURE — 94799 UNLISTED PULMONARY SVC/PX: CPT

## 2024-02-11 PROCEDURE — 94761 N-INVAS EAR/PLS OXIMETRY MLT: CPT

## 2024-02-11 PROCEDURE — 85027 COMPLETE CBC AUTOMATED: CPT | Performed by: INTERNAL MEDICINE

## 2024-02-11 PROCEDURE — 25010000002 ENOXAPARIN PER 10 MG: Performed by: INTERNAL MEDICINE

## 2024-02-11 PROCEDURE — 80048 BASIC METABOLIC PNL TOTAL CA: CPT | Performed by: INTERNAL MEDICINE

## 2024-02-11 PROCEDURE — G0378 HOSPITAL OBSERVATION PER HR: HCPCS

## 2024-02-11 PROCEDURE — 25810000003 SODIUM CHLORIDE 0.9 % SOLUTION 250 ML FLEX CONT: Performed by: INTERNAL MEDICINE

## 2024-02-11 PROCEDURE — 25010000002 AZITHROMYCIN PER 500 MG: Performed by: INTERNAL MEDICINE

## 2024-02-11 PROCEDURE — 94664 DEMO&/EVAL PT USE INHALER: CPT

## 2024-02-11 PROCEDURE — 36415 COLL VENOUS BLD VENIPUNCTURE: CPT | Performed by: INTERNAL MEDICINE

## 2024-02-11 PROCEDURE — 25010000002 METHYLPREDNISOLONE PER 40 MG: Performed by: INTERNAL MEDICINE

## 2024-02-11 RX ORDER — TIZANIDINE 4 MG/1
2 TABLET ORAL ONCE
Status: COMPLETED | OUTPATIENT
Start: 2024-02-11 | End: 2024-02-11

## 2024-02-11 RX ORDER — GUAIFENESIN 600 MG/1
600 TABLET, EXTENDED RELEASE ORAL EVERY 12 HOURS SCHEDULED
Status: DISCONTINUED | OUTPATIENT
Start: 2024-02-11 | End: 2024-02-12

## 2024-02-11 RX ORDER — GABAPENTIN 300 MG/1
300 CAPSULE ORAL 3 TIMES DAILY
Status: DISCONTINUED | OUTPATIENT
Start: 2024-02-11 | End: 2024-02-14 | Stop reason: HOSPADM

## 2024-02-11 RX ORDER — TIZANIDINE 4 MG/1
2 TABLET ORAL NIGHTLY PRN
Status: DISCONTINUED | OUTPATIENT
Start: 2024-02-11 | End: 2024-02-14 | Stop reason: HOSPADM

## 2024-02-11 RX ORDER — ALBUTEROL SULFATE 2.5 MG/3ML
2.5 SOLUTION RESPIRATORY (INHALATION) EVERY 6 HOURS PRN
Status: DISCONTINUED | OUTPATIENT
Start: 2024-02-11 | End: 2024-02-14 | Stop reason: HOSPADM

## 2024-02-11 RX ORDER — ARFORMOTEROL TARTRATE 15 UG/2ML
15 SOLUTION RESPIRATORY (INHALATION)
Status: DISCONTINUED | OUTPATIENT
Start: 2024-02-11 | End: 2024-02-14 | Stop reason: HOSPADM

## 2024-02-11 RX ORDER — CALCIUM CARBONATE 500 MG/1
1 TABLET, CHEWABLE ORAL AS NEEDED
Status: DISCONTINUED | OUTPATIENT
Start: 2024-02-11 | End: 2024-02-14 | Stop reason: HOSPADM

## 2024-02-11 RX ORDER — PANTOPRAZOLE SODIUM 40 MG/1
40 TABLET, DELAYED RELEASE ORAL
Status: DISCONTINUED | OUTPATIENT
Start: 2024-02-11 | End: 2024-02-14 | Stop reason: HOSPADM

## 2024-02-11 RX ORDER — FLUOXETINE HYDROCHLORIDE 20 MG/1
20 CAPSULE ORAL DAILY
Status: DISCONTINUED | OUTPATIENT
Start: 2024-02-11 | End: 2024-02-14 | Stop reason: HOSPADM

## 2024-02-11 RX ORDER — MULTIPLE VITAMINS W/ MINERALS TAB 9MG-400MCG
1 TAB ORAL DAILY
Status: DISCONTINUED | OUTPATIENT
Start: 2024-02-11 | End: 2024-02-14 | Stop reason: HOSPADM

## 2024-02-11 RX ADMIN — GABAPENTIN 300 MG: 300 CAPSULE ORAL at 08:46

## 2024-02-11 RX ADMIN — IPRATROPIUM BROMIDE AND ALBUTEROL SULFATE 3 ML: 2.5; .5 SOLUTION RESPIRATORY (INHALATION) at 11:31

## 2024-02-11 RX ADMIN — ARFORMOTEROL TARTRATE 15 MCG: 15 SOLUTION RESPIRATORY (INHALATION) at 11:30

## 2024-02-11 RX ADMIN — GABAPENTIN 300 MG: 300 CAPSULE ORAL at 20:06

## 2024-02-11 RX ADMIN — ARFORMOTEROL TARTRATE 15 MCG: 15 SOLUTION RESPIRATORY (INHALATION) at 19:50

## 2024-02-11 RX ADMIN — IPRATROPIUM BROMIDE AND ALBUTEROL SULFATE 3 ML: 2.5; .5 SOLUTION RESPIRATORY (INHALATION) at 07:25

## 2024-02-11 RX ADMIN — METHYLPREDNISOLONE SODIUM SUCCINATE 40 MG: 40 INJECTION, POWDER, FOR SOLUTION INTRAMUSCULAR; INTRAVENOUS at 16:22

## 2024-02-11 RX ADMIN — PANTOPRAZOLE SODIUM 40 MG: 40 TABLET, DELAYED RELEASE ORAL at 08:46

## 2024-02-11 RX ADMIN — METHYLPREDNISOLONE SODIUM SUCCINATE 40 MG: 40 INJECTION, POWDER, FOR SOLUTION INTRAMUSCULAR; INTRAVENOUS at 08:46

## 2024-02-11 RX ADMIN — ENOXAPARIN SODIUM 40 MG: 100 INJECTION SUBCUTANEOUS at 20:06

## 2024-02-11 RX ADMIN — FLUOXETINE HYDROCHLORIDE 20 MG: 20 CAPSULE ORAL at 11:23

## 2024-02-11 RX ADMIN — TIZANIDINE 2 MG: 4 TABLET ORAL at 14:27

## 2024-02-11 RX ADMIN — GUAIFENESIN 600 MG: 600 TABLET, EXTENDED RELEASE ORAL at 08:46

## 2024-02-11 RX ADMIN — TIZANIDINE 2 MG: 4 TABLET ORAL at 23:24

## 2024-02-11 RX ADMIN — ANTACID TABLETS 1 TABLET: 500 TABLET, CHEWABLE ORAL at 08:57

## 2024-02-11 RX ADMIN — AZITHROMYCIN MONOHYDRATE 500 MG: 500 INJECTION, POWDER, LYOPHILIZED, FOR SOLUTION INTRAVENOUS at 20:06

## 2024-02-11 RX ADMIN — Medication 1 TABLET: at 08:46

## 2024-02-11 RX ADMIN — GABAPENTIN 300 MG: 300 CAPSULE ORAL at 16:22

## 2024-02-11 RX ADMIN — GUAIFENESIN 600 MG: 600 TABLET, EXTENDED RELEASE ORAL at 20:06

## 2024-02-11 NOTE — PLAN OF CARE
Goal Outcome Evaluation:   Admitted an 83 year old male from ED  with the chief complaints of SOB and cough. He was seen at ED 4 days ago improved and went home. Patient alert and oriented , O2 at 3 lpm continues , bed alarm activated ,he is using the urinal frequently. Blood culture was done at ED so antibiotics started. For observation.

## 2024-02-11 NOTE — H&P
HISTORY AND PHYSICAL   [facility]        Date of Admission: 2/10/2024  Patient Identification:  Name: Tony Leonard  Age: 83 y.o.  Sex: male  :  1940  MRN: [id number]                     Primary Care Physician: Harpreet Kate MD    Chief Complaint:  83 year old gentleman who presented to the emergency room with cough, shortness of breath and wheezing which started a few days ago; he was seen in the ed 4 days ago and improved and went home; he started feeling worse again today; denies fever or chills; he says he uses oxygen at night and was hypoxic when ems arrived; no sick contacts; he was using his mininebs at home with no relief    History of Present Illness:   As above    Past Medical History:  Past Medical History:   Diagnosis Date    ADHD (attention deficit hyperactivity disorder)     Bronchiectasis     CHF (congestive heart failure)     Colon polyp     COPD (chronic obstructive pulmonary disease)     Diverticulosis     Hard of hearing     On home oxygen therapy     2 LITERS    Pneumonia     Prostate cancer 2019    Spinal stenosis, lumbar region with neurogenic claudication 2022     Past Surgical History:  Past Surgical History:   Procedure Laterality Date    BRONCHOSCOPY N/A 2016    Procedure: BRONCHOSCOPY with BAL of right lower lobe and left  lower lobe.  ;  Surgeon: Nando Diaz MD;  Location: [facility] ENDOSCOPY;  Service:     BRONCHOSCOPY N/A 2017    Procedure: BRONCHOSCOPY;  Surgeon: Katharina Salter MD;  Location: [facility] ENDOSCOPY;  Service:     BRONCHOSCOPY Bilateral 2017    Procedure: BRONCHOSCOPY WITH WASHINGS;  Surgeon: Katharina Salter MD;  Location: [facility] ENDOSCOPY;  Service:     BRONCHOSCOPY N/A 2018    Procedure: BRONCHOSCOPY AT BEDSIDE with BAL;  Surgeon: Katharina Salter MD;  Location: [facility] ENDOSCOPY;  Service:     BRONCHOSCOPY N/A 2018    Procedure: BRONCHOSCOPY with BAL and washing;  Surgeon: Shreyas Wild MD;  Location: [facility]  ENDOSCOPY;  Service:     BRONCHOSCOPY Bilateral 4/24/2018    Procedure: BRONCHOSCOPY WITH WASHING;  Surgeon: Katharina Salter MD;  Location: [facility] ENDOSCOPY;  Service: Pulmonary    BRONCHOSCOPY N/A 12/28/2019    Procedure: BRONCHOSCOPY with bilateral washing;  Surgeon: Katharina Salter MD;  Location: [facility] ENDOSCOPY;  Service: Pulmonary    BRONCHOSCOPY Bilateral 1/4/2023    Procedure: BRONCHOSCOPY with lavage;  Surgeon: Katharina Salter MD;  Location: [facility] ENDOSCOPY;  Service: Pulmonary;  Laterality: Bilateral;  mucus plugging    CARDIAC CATHETERIZATION N/A 1/29/2023    Procedure: Left Heart Cath;  Surgeon: Johnnie Ang MD;  Location: [facility] CATH INVASIVE LOCATION;  Service: Cardiology;  Laterality: N/A;    CARDIAC CATHETERIZATION N/A 1/29/2023    Procedure: Coronary angiography;  Surgeon: Johnnie Ang MD;  Location: [facility] CATH INVASIVE LOCATION;  Service: Cardiology;  Laterality: N/A;    COLONOSCOPY  05/17/2013    eh, ih, tort, sig tics    COLONOSCOPY N/A 5/10/2019    Non-thrombosed external hemorrhoids found on perianal exam, Diverticulosis, Tortuous colon, One 5 mm polyp in the mid ascending colon, IH. Path: Tubular adenoma.     COLONOSCOPY N/A 9/24/2022    Procedure: COLONOSCOPY to cecum and TI with argon plasma coagulation.;  Surgeon: Bruce Black MD;  Location: [facility] ENDOSCOPY;  Service: Gastroenterology;  Laterality: N/A;  pre- GI bleeding  post- radiation proctitis, diverticulosis    ENDOSCOPY  03/19/2015    z line irreg, hh    ENDOSCOPY N/A 9/24/2022    Procedure: ESOPHAGOGASTRODUODENOSCOPY;  Surgeon: Bruce Black MD;  Location: [facility] ENDOSCOPY;  Service: Gastroenterology;  Laterality: N/A;  pre- GI bleeding  post- esophagitis, hiatal hernia    HIP OPEN REDUCTION Right 1/17/2018    Procedure: HIP OPEN REDUCTION INTERNAL FIXATION WITH DYNAMIC HIP SCREW;  Surgeon: Les Black MD;  Location: [facility] MAIN OR;  Service:     SPINE SURGERY      TONSILLECTOMY      TOTAL HIP  ARTHROPLASTY REVISION Right 9/23/2019    Procedure: HIP  REVISION RIGHT;  Surgeon: Isauro Warner MD;  Location: [facility] MAIN OR;  Service: Orthopedics      Home Meds:  (Not in a hospital admission)      Allergies:  Allergies   Allergen Reactions    Daliresp [Roflumilast] Anaphylaxis    Latex Rash    Sulfa Antibiotics Rash     Immunizations:  Immunization History   Administered Date(s) Administered    COVID-19 (PFIZER) Purple Cap Monovalent 01/15/2021, 02/05/2021, 10/26/2021    Covid-19 (Pfizer) Gray Cap Monovalent 08/05/2022    Fluzone High Dose =>65 Years ([employer] ONLY) 09/25/2013, 09/17/2019, 09/11/2020    Influenza Seasonal Injectable 10/01/2021    Influenza, Unspecified 09/13/2017, 10/01/2021    Pneumococcal Conjugate 13-Valent (PCV13) 01/11/2017    Pneumococcal Polysaccharide (PPSV23) 10/10/2003     Social History:   Social History     Social History Narrative    Not on file     Social History     Socioeconomic History    Marital status: Single   Tobacco Use    Smoking status: Never    Smokeless tobacco: Never   Vaping Use    Vaping Use: Never used   Substance and Sexual Activity    Alcohol use: No     Comment: red wine occassional    Drug use: No    Sexual activity: Defer       Family History:  Family History   Problem Relation Age of Onset    Thyroid disease Mother     No Known Problems Father     Malig Hyperthermia Neg Hx         Review of Systems  See history of present illness and past medical history.  Patient denies headache, dizziness, syncope, falls, trauma, change in vision, change in hearing, change in taste, changes in weight, changes in appetite, focal weakness, numbness, or paresthesia.  Patient denies chest pain, palpitations,  sinus pressure, rhinorrhea, epistaxis, hemoptysis, nausea, vomiting,hematemesis, diarrhea, constipation or hematochezia.  Denies cold or heat intolerance, polydipsia, polyuria, polyphagia. Denies hematuria, pyuria, dysuria, hesitancy, frequency or urgency. Denies  "consumption of raw and under cooked meats foods or change in water source.      Objective:  T Max 24 hrs: Temp (24hrs), Av °F (36.7 °C), Min:98 °F (36.7 °C), Max:98 °F (36.7 °C)    Vitals Ranges:   Temp:  [98 °F (36.7 °C)] 98 °F (36.7 °C)  Heart Rate:  [] 90  Resp:  [24] 24  BP: (108-151)/(70-82) 148/80      Exam:  /80   Pulse 90   Temp 98 °F (36.7 °C) (Tympanic)   Resp 24   Ht 180.3 cm (71\")   Wt 85.7 kg (189 lb)   SpO2 97%   BMI 26.36 kg/m²     General Appearance:    Alert, cooperative, no distress, appears stated age; chronically ill appearing   Head:    Normocephalic, without obvious abnormality, atraumatic   Eyes:    PERRL, conjunctivae/corneas clear, EOM's intact, both eyes   Ears:    Normal external ear canals, both ears   Nose:   Nares normal, septum midline, mucosa normal, no drainage    or sinus tenderness   Throat:   Lips, mucosa, and tongue normal   Neck:   Supple, symmetrical, trachea midline, no adenopathy;     thyroid:  no enlargement/tenderness/nodules; no carotid    bruit or JVD   Back:     Symmetric, no curvature, ROM normal, no CVA tenderness   Lungs:     Decreased breath sounds bilaterally, respirations unlabored   Chest Wall:    No tenderness or deformity    Heart:    Regular rate and rhythm, S1 and S2 normal, no murmur, rub   or gallop   Abdomen:     Soft, nontender, bowel sounds active all four quadrants,     no masses, no hepatomegaly, no splenomegaly   Extremities:   Extremities normal, atraumatic, no cyanosis or edema                       .    Data Review:  Labs in chart were reviewed.  WBC   Date Value Ref Range Status   02/10/2024 6.91 3.40 - 10.80 10*3/mm3 Final     Hemoglobin   Date Value Ref Range Status   02/10/2024 11.7 (L) 13.0 - 17.7 g/dL Final     Hematocrit   Date Value Ref Range Status   02/10/2024 37.1 (L) 37.5 - 51.0 % Final     Platelets   Date Value Ref Range Status   02/10/2024 300 140 - 450 10*3/mm3 Final     Sodium   Date Value Ref Range Status "   02/10/2024 141 136 - 145 mmol/L Final     Potassium   Date Value Ref Range Status   02/10/2024 4.3 3.5 - 5.2 mmol/L Final     Comment:     Slight hemolysis detected by analyzer. Result may be falsely elevated.     Chloride   Date Value Ref Range Status   02/10/2024 102 98 - 107 mmol/L Final     CO2   Date Value Ref Range Status   02/10/2024 22.2 22.0 - 29.0 mmol/L Final     BUN   Date Value Ref Range Status   02/10/2024 25 (H) 8 - 23 mg/dL Final     Creatinine   Date Value Ref Range Status   02/10/2024 1.02 0.76 - 1.27 mg/dL Final     Glucose   Date Value Ref Range Status   02/10/2024 104 (H) 65 - 99 mg/dL Final     Calcium   Date Value Ref Range Status   02/10/2024 9.1 8.6 - 10.5 mg/dL Final                Imaging Results (All)       Procedure Component Value Units Date/Time    XR Chest 1 View [[id number]] Collected: 02/10/24 1730     Updated: 02/10/24 1734    Narrative:      XR CHEST 1 VW-     Clinical: COPD, cough     COMPARISON examination dated 2/6/2024     FINDINGS: There has been some clearing of the vague right lung  infiltrate within the interim. The cardiomediastinal silhouette is  stable. No vascular congestion or new airspace disease has developed. No  gross pleural effusion seen. The remainder is unremarkable.     This report was finalized on 2/10/2024 5:31 PM by Dr. Richard Solitario M.D  on Workstation: [id number]                 Assessment:  Active Hospital Problems    Diagnosis  POA    **Acute exacerbation of chronic obstructive pulmonary disease (COPD) [J44.1]  Yes      Resolved Hospital Problems   No resolved problems to display.   Acute hypoxic respiratory failure  Hyperlipidemia  Bronchiectasis  chf    Plan:  Will continue steroids  Ask pulmonary to see him  Antibiotics  Monitor on telemtry   Wean oxygen as tolerated  DKareemw patient and ed provider  Elizabet Parra MD  2/10/2024  19:03 EST

## 2024-02-11 NOTE — PROGRESS NOTES
Name: Tony Leonard ADMIT: 2/10/2024   : 1940  PCP: Harpreet Kate MD    MRN: [id number] LOS: 0 days   AGE/SEX: 83 y.o. male  ROOM: [facility]     Subjective   Subjective   Patient is seen at bedside, no new complaints.       Objective   Objective   Vital Signs  Temp:  [97.5 °F (36.4 °C)-98.6 °F (37 °C)] 97.5 °F (36.4 °C)  Heart Rate:  [] 95  Resp:  [18-24] 18  BP: (108-152)/(71-92) 108/73  SpO2:  [92 %-100 %] 92 %  on  Flow (L/min):  [3] 3;   Device (Oxygen Therapy): room air  Body mass index is 25.24 kg/m².  Physical Exam    General, awake and alert.  Head and ENT, normocephalic and atraumatic.  Lungs, symmetric expansion, equal air entry bilaterally.  Heart, regular rate and rhythm.  Abdomen, soft and nontender.  Extremities, no clubbing or cyanosis.  Neuro, no focal deficits.  Skin: Warm and no rash.  Psych, normal mood and affect.  Musculoskeletal, joint examination is grossly normal.    Results Review     I reviewed the patient's new clinical results.  Results from last 7 days   Lab Units 02/11/24  0327 02/10/24  1617 02/06/24  2038   WBC 10*3/mm3 3.22* 6.91 7.04   HEMOGLOBIN g/dL 10.6* 11.7* 10.3*   PLATELETS 10*3/mm3 265 300 265     Results from last 7 days   Lab Units 02/11/24  0327 02/10/24  1617 02/06/24  2038   SODIUM mmol/L 140 141 138   POTASSIUM mmol/L 4.6 4.3 4.5   CHLORIDE mmol/L 103 102 104   CO2 mmol/L 19.0* 22.2 22.0   BUN mg/dL 23 25* 21   CREATININE mg/dL 0.90 1.02 1.02   GLUCOSE mg/dL 119* 104* 102*   EGFR mL/min/1.73 83.7 72.0 72.0     Results from last 7 days   Lab Units 02/10/24  1617 02/06/24  2038   ALBUMIN g/dL 4.3 4.0   BILIRUBIN mg/dL 0.6 0.4   ALK PHOS U/L 72 64   AST (SGOT) U/L 25 23   ALT (SGPT) U/L 21 15     Results from last 7 days   Lab Units 24  0327 02/10/24  1617 02/06/24  2038   CALCIUM mg/dL 8.8 9.1 9.1   ALBUMIN g/dL  --  4.3 4.0     Results from last 7 days   Lab Units 02/10/24  1617 248 24   PROCALCITONIN ng/mL  --   --   "0.06   LACTATE mmol/L 1.5 1.2  --      No results found for: \"HGBA1C\", \"POCGLU\"    No radiology results for the last day    I have personally reviewed all medications:  Scheduled Medications  arformoterol, 15 mcg, Nebulization, BID - RT   And  tiotropium bromide monohydrate, 2 puff, Inhalation, Daily - RT  azithromycin, 500 mg, Intravenous, Q24H  enoxaparin, 40 mg, Subcutaneous, Nightly  FLUoxetine, 20 mg, Oral, Daily  gabapentin, 300 mg, Oral, TID  guaiFENesin, 600 mg, Oral, Q12H  ipratropium-albuterol, 3 mL, Nebulization, Q6H While Awake - RT  methylPREDNISolone sodium succinate, 40 mg, Intravenous, Q8H  multivitamin with minerals, 1 tablet, Oral, Daily  pantoprazole, 40 mg, Oral, Q AM    Infusions   Diet  Diet: Cardiac Diets; Healthy Heart (2-3 Na+); Texture: Regular Texture (IDDSI 7); Fluid Consistency: Thin (IDDSI 0)    I have personally reviewed:  [x]  Laboratory   [x]  Microbiology   [x]  Radiology   [x]  EKG/Telemetry  [x]  Cardiology/Vascular   []  Pathology    []  Records       Assessment/Plan     Active Hospital Problems    Diagnosis  POA    **Acute exacerbation of chronic obstructive pulmonary disease (COPD) [J44.1]  Yes    Paroxysmal atrial fibrillation [I48.0]  Yes    HTN (hypertension) [I10]  Yes    Chronic diastolic CHF (congestive heart failure) [I50.32]  Yes    Mild malnutrition [E44.1]  Yes    Dyslipidemia [E78.5]  Yes      Resolved Hospital Problems   No resolved problems to display.       83 y.o. male admitted with Acute exacerbation of chronic obstructive pulmonary disease (COPD).    Assessment and plan  1.  Acute exacerbation of COPD, acute hypoxemic respiratory failure, continue steroids, pulmonary consultation requested.  Follow management recommendations, continue oxygen supplementation as needed.  Continue breathing treatments.    2.  Paroxysmal atrial fibrillation, patient is currently rate controlled.    3. Chronic diastolic CHF, patient currently appears euvolemic.    4.  CODE STATUS is " full code.  Further plans based on hospital course.          Olaf Garzon MD  [facility]  02/11/24  16:01 EST

## 2024-02-11 NOTE — PLAN OF CARE
Problem: Adult Inpatient Plan of Care  Goal: Plan of Care Review  Outcome: Ongoing, Progressing  Flowsheets (Taken 2/11/2024 0996)  Progress: improving  Plan of Care Reviewed With: patient  Outcome Evaluation: Vitals stable. Currently on room air. x1 assist to bathroom. +BM. IV solu-medrol continued. Plan of care ongoing.

## 2024-02-12 LAB
ALBUMIN SERPL-MCNC: 3.6 G/DL (ref 3.5–5.2)
ALBUMIN/GLOB SERPL: 1.4 G/DL
ALP SERPL-CCNC: 54 U/L (ref 39–117)
ALT SERPL W P-5'-P-CCNC: 14 U/L (ref 1–41)
ANION GAP SERPL CALCULATED.3IONS-SCNC: 10.5 MMOL/L (ref 5–15)
AST SERPL-CCNC: 13 U/L (ref 1–40)
BASOPHILS # BLD AUTO: 0 10*3/MM3 (ref 0–0.2)
BASOPHILS NFR BLD AUTO: 0 % (ref 0–1.5)
BILIRUB SERPL-MCNC: 0.3 MG/DL (ref 0–1.2)
BUN SERPL-MCNC: 24 MG/DL (ref 8–23)
BUN/CREAT SERPL: 25 (ref 7–25)
CALCIUM SPEC-SCNC: 8.4 MG/DL (ref 8.6–10.5)
CHLORIDE SERPL-SCNC: 106 MMOL/L (ref 98–107)
CO2 SERPL-SCNC: 22.5 MMOL/L (ref 22–29)
CREAT SERPL-MCNC: 0.96 MG/DL (ref 0.76–1.27)
DEPRECATED RDW RBC AUTO: 44.2 FL (ref 37–54)
EGFRCR SERPLBLD CKD-EPI 2021: 77.5 ML/MIN/1.73
EOSINOPHIL # BLD AUTO: 0 10*3/MM3 (ref 0–0.4)
EOSINOPHIL NFR BLD AUTO: 0 % (ref 0.3–6.2)
ERYTHROCYTE [DISTWIDTH] IN BLOOD BY AUTOMATED COUNT: 14.5 % (ref 12.3–15.4)
GLOBULIN UR ELPH-MCNC: 2.5 GM/DL
GLUCOSE SERPL-MCNC: 163 MG/DL (ref 65–99)
HCT VFR BLD AUTO: 29.6 % (ref 37.5–51)
HGB BLD-MCNC: 9.5 G/DL (ref 13–17.7)
IMM GRANULOCYTES # BLD AUTO: 0.03 10*3/MM3 (ref 0–0.05)
IMM GRANULOCYTES NFR BLD AUTO: 0.4 % (ref 0–0.5)
LYMPHOCYTES # BLD AUTO: 0.52 10*3/MM3 (ref 0.7–3.1)
LYMPHOCYTES NFR BLD AUTO: 6.8 % (ref 19.6–45.3)
MCH RBC QN AUTO: 26.8 PG (ref 26.6–33)
MCHC RBC AUTO-ENTMCNC: 32.1 G/DL (ref 31.5–35.7)
MCV RBC AUTO: 83.4 FL (ref 79–97)
MONOCYTES # BLD AUTO: 0.18 10*3/MM3 (ref 0.1–0.9)
MONOCYTES NFR BLD AUTO: 2.3 % (ref 5–12)
NEUTROPHILS NFR BLD AUTO: 6.96 10*3/MM3 (ref 1.7–7)
NEUTROPHILS NFR BLD AUTO: 90.5 % (ref 42.7–76)
NRBC BLD AUTO-RTO: 0.1 /100 WBC (ref 0–0.2)
PLATELET # BLD AUTO: 255 10*3/MM3 (ref 140–450)
PMV BLD AUTO: 9.4 FL (ref 6–12)
POTASSIUM SERPL-SCNC: 4.1 MMOL/L (ref 3.5–5.2)
PROT SERPL-MCNC: 6.1 G/DL (ref 6–8.5)
RBC # BLD AUTO: 3.55 10*6/MM3 (ref 4.14–5.8)
SODIUM SERPL-SCNC: 139 MMOL/L (ref 136–145)
WBC NRBC COR # BLD AUTO: 7.69 10*3/MM3 (ref 3.4–10.8)

## 2024-02-12 PROCEDURE — 94761 N-INVAS EAR/PLS OXIMETRY MLT: CPT

## 2024-02-12 PROCEDURE — 25010000002 METHYLPREDNISOLONE PER 40 MG: Performed by: INTERNAL MEDICINE

## 2024-02-12 PROCEDURE — 25810000003 SODIUM CHLORIDE 0.9 % SOLUTION 250 ML FLEX CONT: Performed by: INTERNAL MEDICINE

## 2024-02-12 PROCEDURE — G0378 HOSPITAL OBSERVATION PER HR: HCPCS

## 2024-02-12 PROCEDURE — 94664 DEMO&/EVAL PT USE INHALER: CPT

## 2024-02-12 PROCEDURE — 94799 UNLISTED PULMONARY SVC/PX: CPT

## 2024-02-12 PROCEDURE — 80053 COMPREHEN METABOLIC PANEL: CPT | Performed by: INTERNAL MEDICINE

## 2024-02-12 PROCEDURE — 25010000002 AZITHROMYCIN PER 500 MG: Performed by: INTERNAL MEDICINE

## 2024-02-12 PROCEDURE — 85025 COMPLETE CBC W/AUTO DIFF WBC: CPT | Performed by: INTERNAL MEDICINE

## 2024-02-12 PROCEDURE — 25010000002 ENOXAPARIN PER 10 MG: Performed by: INTERNAL MEDICINE

## 2024-02-12 RX ORDER — GUAIFENESIN 600 MG/1
1200 TABLET, EXTENDED RELEASE ORAL EVERY 12 HOURS SCHEDULED
Status: DISCONTINUED | OUTPATIENT
Start: 2024-02-12 | End: 2024-02-14 | Stop reason: HOSPADM

## 2024-02-12 RX ORDER — PREDNISONE 20 MG/1
40 TABLET ORAL
Status: DISCONTINUED | OUTPATIENT
Start: 2024-02-13 | End: 2024-02-14 | Stop reason: HOSPADM

## 2024-02-12 RX ADMIN — ARFORMOTEROL TARTRATE 15 MCG: 15 SOLUTION RESPIRATORY (INHALATION) at 07:02

## 2024-02-12 RX ADMIN — IPRATROPIUM BROMIDE AND ALBUTEROL SULFATE 3 ML: 2.5; .5 SOLUTION RESPIRATORY (INHALATION) at 12:13

## 2024-02-12 RX ADMIN — GABAPENTIN 300 MG: 300 CAPSULE ORAL at 16:39

## 2024-02-12 RX ADMIN — GUAIFENESIN 600 MG: 600 TABLET, EXTENDED RELEASE ORAL at 09:07

## 2024-02-12 RX ADMIN — TIZANIDINE 2 MG: 4 TABLET ORAL at 22:37

## 2024-02-12 RX ADMIN — IPRATROPIUM BROMIDE AND ALBUTEROL SULFATE 3 ML: 2.5; .5 SOLUTION RESPIRATORY (INHALATION) at 21:41

## 2024-02-12 RX ADMIN — ARFORMOTEROL TARTRATE 15 MCG: 15 SOLUTION RESPIRATORY (INHALATION) at 21:41

## 2024-02-12 RX ADMIN — GABAPENTIN 300 MG: 300 CAPSULE ORAL at 09:07

## 2024-02-12 RX ADMIN — METHYLPREDNISOLONE SODIUM SUCCINATE 40 MG: 40 INJECTION, POWDER, FOR SOLUTION INTRAMUSCULAR; INTRAVENOUS at 00:27

## 2024-02-12 RX ADMIN — METHYLPREDNISOLONE SODIUM SUCCINATE 40 MG: 40 INJECTION, POWDER, FOR SOLUTION INTRAMUSCULAR; INTRAVENOUS at 09:07

## 2024-02-12 RX ADMIN — PANTOPRAZOLE SODIUM 40 MG: 40 TABLET, DELAYED RELEASE ORAL at 06:19

## 2024-02-12 RX ADMIN — FLUOXETINE HYDROCHLORIDE 20 MG: 20 CAPSULE ORAL at 09:07

## 2024-02-12 RX ADMIN — Medication 1 TABLET: at 09:07

## 2024-02-12 RX ADMIN — GUAIFENESIN 1200 MG: 600 TABLET, EXTENDED RELEASE ORAL at 22:35

## 2024-02-12 RX ADMIN — TIOTROPIUM BROMIDE INHALATION SPRAY 2 PUFF: 3.12 SPRAY, METERED RESPIRATORY (INHALATION) at 07:02

## 2024-02-12 RX ADMIN — ENOXAPARIN SODIUM 40 MG: 100 INJECTION SUBCUTANEOUS at 22:40

## 2024-02-12 RX ADMIN — GABAPENTIN 300 MG: 300 CAPSULE ORAL at 22:34

## 2024-02-12 RX ADMIN — IPRATROPIUM BROMIDE AND ALBUTEROL SULFATE 3 ML: 2.5; .5 SOLUTION RESPIRATORY (INHALATION) at 07:02

## 2024-02-12 RX ADMIN — AZITHROMYCIN MONOHYDRATE 500 MG: 500 INJECTION, POWDER, LYOPHILIZED, FOR SOLUTION INTRAVENOUS at 18:23

## 2024-02-12 NOTE — PLAN OF CARE
Goal Outcome Evaluation:         Pt A&O, R/A, BM this shift, up with x1 assist with rollator to the B/R.  Added potty hat to B/R because pt voiding into toilet instead of urinal despite instruction.  IV abx as ordered.  Denies pain.  VSS.          Problem: Adjustment to Illness (Sepsis/Septic Shock)  Goal: Optimal Coping  Outcome: Ongoing, Progressing  Intervention: Optimize Psychosocial Adjustment to Illness  Description: Acknowledge, normalize, validate intensity and complexity of patient and support system response to situation.  Provide opportunity for expression of thoughts, feelings and concerns; respond with compassion and reassurance.  Decrease stress and anxiety by providing information about patient’s status and treatment.  Facilitate support system presence and participation in care; consider providing a diary in intensive care situation.  Support coping by recognizing current coping strategies; provide aid in developing new strategies.  Acknowledge and normalize difficulty in managing life-long lifestyle changes and expectations.  Assess and monitor for signs and symptoms of psychologic distress, anxiety and depression.  Consider palliative care consult for goals of care conversation, if the condition is worsening despite treatment.  Recent Flowsheet Documentation  Taken 2/12/2024 0000 by Asiya Tse, RN  Supportive Measures:   active listening utilized   decision-making supported  Taken 2/11/2024 2000 by Asiya Tse, RN  Supportive Measures:   active listening utilized   decision-making supported     Problem: Bleeding (Sepsis/Septic Shock)  Goal: Absence of Bleeding  Outcome: Ongoing, Progressing     Problem: Glycemic Control Impaired (Sepsis/Septic Shock)  Goal: Blood Glucose Level Within Desired Range  Outcome: Ongoing, Progressing  Intervention: Optimize Glycemic Control  Description: Establish target blood glucose levels based on patient-specific factors, such as illness severity and  comorbidity.  Document blood glucose levels and monitor trend.  If elevated blood glucose level is not within desired range, initiate insulin therapy to optimize glycemic control using an insulin management protocol.  Avoid hypoglycemic episodes by proactively adjusting insulin therapy if there is a change in condition, treatment, illness severity, medication or nutrition support therapy.  Recent Flowsheet Documentation  Taken 2/12/2024 0000 by Asiya Tse, RN  Glycemic Management: oral hydration promoted  Taken 2/11/2024 2000 by Asiya Tse RN  Glycemic Management: oral hydration promoted     Problem: Infection Progression (Sepsis/Septic Shock)  Goal: Absence of Infection Signs and Symptoms  Outcome: Ongoing, Progressing  Intervention: Initiate Sepsis Management  Description: Provide fluid therapy, such as crystalloid or albumin, to increase intravascular volume, organ perfusion and oxygen delivery.  Provide respiratory support, such as oxygen therapy, noninvasive or invasive positive pressure ventilation, to achieve oxygenation and ventilation goal; avoid hyperoxemia.  Obtain cultures prior to initiating antimicrobial therapy when possible. Do not delay for laboratory results in the presence of high suspicion or clinical indicators.  Administer intravenous broad-spectrum antimicrobial therapy promptly.  Implement hemodynamic monitoring to guide intravascular support based on individual targeted parameters.  Determine and address underlying source of infection aggressively; implement transmission-based precautions and isolation, as indicated.  Recent Flowsheet Documentation  Taken 2/12/2024 0200 by Asiya Tse, RN  Infection Prevention:   rest/sleep promoted   single patient room provided   visitors restricted/screened   personal protective equipment utilized   hand hygiene promoted  Taken 2/12/2024 0000 by Asiya Tse RN  Stabilization Measures: legs elevated  Infection Management: aseptic  technique maintained  Infection Prevention:   rest/sleep promoted   single patient room provided   visitors restricted/screened   personal protective equipment utilized   hand hygiene promoted  Taken 2/11/2024 2200 by Asiya Tse RN  Infection Prevention:   personal protective equipment utilized   visitors restricted/screened   rest/sleep promoted   single patient room provided   hand hygiene promoted  Taken 2/11/2024 2000 by Asiya Tse, RN  Stabilization Measures: legs elevated  Infection Management: aseptic technique maintained  Infection Prevention:   rest/sleep promoted   single patient room provided   visitors restricted/screened   personal protective equipment utilized   hand hygiene promoted  Intervention: Promote Recovery  Description: Encourage pulmonary hygiene, such as cough-enhancement and airway-clearance techniques, that may include use of incentive spirometry, deep breathing and cough.  Encourage early rehabilitation and physical activity to optimize functional ability and activity tolerance, as well as minimize delirium.  Promote energy conservation; minimize oxygen demand and consumption by adjusting environment, decreasing stimulation, maintaining normothermia and treating pain.  Optimize fluid balance, nutrition intake, sleep and glycemic control to maintain tissue perfusion and enhance immune response.  Recent Flowsheet Documentation  Taken 2/12/2024 0000 by sAiya Tse, RN  Activity Management: activity encouraged  Sleep/Rest Enhancement:   awakenings minimized   consistent schedule promoted  Taken 2/11/2024 2000 by Asiya Tse, RN  Activity Management: activity encouraged  Sleep/Rest Enhancement:   awakenings minimized   consistent schedule promoted   room darkened  Intervention: Promote Stabilization  Description: Monitor for signs of fluid responsiveness and overload; consider fluid adjustment and diuretic therapy.  Anticipate use of vasoactive agent to support  microperfusion and oxygen delivery; titrate to response.  Monitor laboratory value, diagnostic test and clinical status trends for signs of infection progression and multiple organ failure.  Assess effectiveness of and potential for de-escalation of the antimicrobial regimen daily.  Provide fever-reduction and comfort measures.  Monitor and manage electrolyte imbalance, such as hypocalcemia.  Use lung protective ventilation measures, such as low volume, inspiratory pressure, optimal positive end-expiratory pressure, to minimize the risk of ventilator-induced lung injury; ensure minute volume demands.  Prepare for supportive therapy, such as corticosteroid therapy, coagulopathy management, CRRT (continuous renal replacement therapy), hemofiltration and cardiac-assist device.  Recent Flowsheet Documentation  Taken 2/12/2024 0000 by Asiya Tse, RN  Fluid/Electrolyte Management: fluids provided  Taken 2/11/2024 2000 by Asiya Tse, RN  Fluid/Electrolyte Management: fluids provided     Problem: Nutrition Impaired (Sepsis/Septic Shock)  Goal: Optimal Nutrition Intake  Outcome: Ongoing, Progressing     Problem: Adult Inpatient Plan of Care  Goal: Plan of Care Review  Outcome: Ongoing, Progressing  Goal: Absence of Hospital-Acquired Illness or Injury  Outcome: Ongoing, Progressing  Intervention: Identify and Manage Fall Risk  Description: Perform standard risk assessment on admission using a validated tool or comprehensive approach appropriate to the patient; reassess fall risk frequently, with change in status or transfer to another level of care.  Communicate fall injury risk to interprofessional healthcare team.  Determine need for increased observation, equipment and environmental modification, such as low bed, signage and supportive, nonskid footwear.  Adjust safety measures to individual developmental age, stage and identified risk factors.  Reinforce the importance of safety and physical activity with  patient and family.  Perform regular intentional rounding to assess need for position change, pain assessment and personal needs, including assistance with toileting.  Recent Flowsheet Documentation  Taken 2/12/2024 0200 by Asiya Tse RN  Safety Promotion/Fall Prevention: safety round/check completed  Taken 2/12/2024 0000 by Asiya Tse RN  Safety Promotion/Fall Prevention: safety round/check completed  Taken 2/11/2024 2200 by Asiya Tse RN  Safety Promotion/Fall Prevention: safety round/check completed  Taken 2/11/2024 2000 by Asiya Tse RN  Safety Promotion/Fall Prevention: safety round/check completed  Intervention: Prevent Skin Injury  Description: Perform a screening for skin injury risk, such as pressure or moisture associated skin damage on admission and at regular intervals throughout hospital stay.  Keep all areas of skin (especially folds) clean and dry.  Maintain adequate skin hydration.  Relieve and redistribute pressure and protect bony prominences; implement measures based on patient-specific risk factors.  Match turning and repositioning schedule to clinical condition.  Encourage weight shift frequently; assist with reposition if unable to complete independently.  Float heels off bed; avoid pressure on the Achilles tendon.  Keep skin free from extended contact with medical devices.  Encourage functional activity and mobility, as early as tolerated.  Use aids (e.g., slide boards, mechanical lift) during transfer.  Recent Flowsheet Documentation  Taken 2/12/2024 0200 by Asiya Tse RN  Body Position:   position changed independently   right  Taken 2/12/2024 0000 by Asiya Tse RN  Body Position:   position changed independently   left  Skin Protection:   adhesive use limited   incontinence pads utilized   tubing/devices free from skin contact   transparent dressing maintained  Taken 2/11/2024 2200 by Asiya Tse RN  Body Position:   position changed  independently   sitting up in bed  Taken 2/11/2024 2000 by Asiya Tse RN  Body Position:   position changed independently   right  Skin Protection:   adhesive use limited   incontinence pads utilized   transparent dressing maintained   tubing/devices free from skin contact  Intervention: Prevent and Manage VTE (Venous Thromboembolism) Risk  Description: Assess for VTE (venous thromboembolism) risk.  Encourage and assist with early ambulation.  Initiate and maintain compression or other therapy, as indicated, based on identified risk in accordance with organizational protocol and provider order.  Encourage both active and passive leg exercises while in bed, if unable to ambulate.  Recent Flowsheet Documentation  Taken 2/12/2024 0000 by Asiya Tse, RN  Activity Management: activity encouraged  VTE Prevention/Management: (Lovenox) other (see comments)  Taken 2/11/2024 2000 by Asiya Tse RN  Activity Management: activity encouraged  VTE Prevention/Management: (Lovenox) other (see comments)  Intervention: Prevent Infection  Description: Maintain skin and mucous membrane integrity; promote hand, oral and pulmonary hygiene.  Optimize fluid balance, nutrition, sleep and glycemic control to maximize infection resistance.  Identify potential sources of infection early to prevent or mitigate progression of infection (e.g., wound, lines, devices).  Evaluate ongoing need for invasive devices; remove promptly when no longer indicated.  Recent Flowsheet Documentation  Taken 2/12/2024 0200 by Asiya Tse, RN  Infection Prevention:   rest/sleep promoted   single patient room provided   visitors restricted/screened   personal protective equipment utilized   hand hygiene promoted  Taken 2/12/2024 0000 by Asiya Tse, RN  Infection Prevention:   rest/sleep promoted   single patient room provided   visitors restricted/screened   personal protective equipment utilized   hand hygiene promoted  Taken 2/11/2024  2200 by Asiya Tse RN  Infection Prevention:   personal protective equipment utilized   visitors restricted/screened   rest/sleep promoted   single patient room provided   hand hygiene promoted  Taken 2/11/2024 2000 by Asiya Tse RN  Infection Prevention:   rest/sleep promoted   single patient room provided   visitors restricted/screened   personal protective equipment utilized   hand hygiene promoted     Problem: Asthma Comorbidity  Goal: Maintenance of Asthma Control  Outcome: Ongoing, Progressing  Intervention: Maintain Asthma Symptom Control  Description: Evaluate adherence to self-management (asthma action plan), such as medication, symptom-control, trigger-avoidance and self-monitoring.  Advocate for continuation of home regimen, including medication, method of delivery, schedule and symptom monitoring; acknowledge preferred modality and routine.  Minimize exposure to potential triggers, such as perfume, cleaning chemicals and all types of smoke.  Assess for proper use of inhaled medication and delivery technique; assist or reinstruct if needed.  Evaluate effectiveness of coping skills; encourage expression of feelings, expectations and concerns related to disease management and quality of life; reinforce education to enhance management plan and wellbeing.  Recent Flowsheet Documentation  Taken 2/12/2024 0200 by Asiya Tse RN  Medication Review/Management:   medications reviewed   high-risk medications identified  Taken 2/12/2024 0000 by Asiya Tse RN  Medication Review/Management:   medications reviewed   high-risk medications identified  Taken 2/11/2024 2200 by Asiya Tse RN  Medication Review/Management:   medications reviewed   high-risk medications identified  Taken 2/11/2024 2000 by Asiya Tse RN  Medication Review/Management:   medications reviewed   high-risk medications identified     Problem: COPD (Chronic Obstructive Pulmonary Disease) Comorbidity  Goal:  Maintenance of COPD Symptom Control  Outcome: Ongoing, Progressing  Intervention: Maintain COPD-Symptom Control  Description: Evaluate adherence to management plan (e.g., medication, trigger avoidance, infection prevention, self-monitoring).  Advocate for continuation of home regimen, including medication, method of delivery, schedule and symptom monitoring.  Anticipate the need for breathing techniques and activity pacing to minimize fatigue and breathlessness.  Assess for proper use of inhaled medication and delivery technique; assist or reinstruct if needed.  Evaluate effectiveness of coping skills; encourage expression of feelings, expectations and concerns related to disease management and quality of life; reinforce education to enhance management plan and wellbeing.  Recent Flowsheet Documentation  Taken 2/12/2024 0200 by Asiya Tse RN  Medication Review/Management:   medications reviewed   high-risk medications identified  Taken 2/12/2024 0000 by Asiya Tse RN  Supportive Measures:   active listening utilized   decision-making supported  Medication Review/Management:   medications reviewed   high-risk medications identified  Taken 2/11/2024 2200 by Asiya Tse, RN  Medication Review/Management:   medications reviewed   high-risk medications identified  Taken 2/11/2024 2000 by Asiya Tse, RN  Supportive Measures:   active listening utilized   decision-making supported  Medication Review/Management:   medications reviewed   high-risk medications identified     Problem: Obstructive Sleep Apnea Risk or Actual Comorbidity Management  Goal: Unobstructed Breathing During Sleep  Outcome: Ongoing, Progressing     Problem: Fall Injury Risk  Goal: Absence of Fall and Fall-Related Injury  Outcome: Ongoing, Progressing  Intervention: Identify and Manage Contributors  Description: Develop a fall prevention plan with the patient and caregiver/family.  Provide reorientation, appropriate sensory  stimulation and routines with changes in mental status to decrease risk of fall.  Promote use of personal vision and auditory aids.  Assess assistance level required for safe and effective self-care; provide support as needed, such as toileting, mobilization. For age 65 and older, implement timed toileting with assistance.  Encourage physical activity, such as performance of mobility and self-care at highest level of patient ability, multicomponent exercise program and provision of appropriate assistive devices.  If fall occurs, assess the severity of injury; implement fall injury protocol. Determine the cause and revise fall injury prevention plan.  Regularly review medication contribution to fall risk; adjust medication administration times to minimize risk of falling.  Consider risk related to polypharmacy and age.  Balance adequate pain management with potential for oversedation.  Recent Flowsheet Documentation  Taken 2/12/2024 0200 by Asiya Tse, RN  Medication Review/Management:   medications reviewed   high-risk medications identified  Taken 2/12/2024 0000 by Asiya Tse, RN  Medication Review/Management:   medications reviewed   high-risk medications identified  Self-Care Promotion:   independence encouraged   BADL personal objects within reach   safe use of adaptive equipment encouraged  Taken 2/11/2024 2200 by Asiya Tse, RN  Medication Review/Management:   medications reviewed   high-risk medications identified  Taken 2/11/2024 2000 by Asiya Tse, RN  Medication Review/Management:   medications reviewed   high-risk medications identified  Self-Care Promotion:   independence encouraged   BADL personal objects within reach   safe use of adaptive equipment encouraged  Intervention: Promote Injury-Free Environment  Description: Provide a safe, barrier-free environment that encourages independent activity.  Keep care area uncluttered and well-lighted.  Determine need for increased  observation or monitoring.  Avoid use of devices that minimize mobility, such as restraints or indwelling urinary catheter.  Recent Flowsheet Documentation  Taken 2/12/2024 0200 by Asiya Tse RN  Safety Promotion/Fall Prevention: safety round/check completed  Taken 2/12/2024 0000 by Asiya Tse RN  Safety Promotion/Fall Prevention: safety round/check completed  Taken 2/11/2024 2200 by Asiya Tse RN  Safety Promotion/Fall Prevention: safety round/check completed  Taken 2/11/2024 2000 by Asiya Tse RN  Safety Promotion/Fall Prevention: safety round/check completed     Problem: Skin Injury Risk Increased  Goal: Skin Health and Integrity  Outcome: Ongoing, Progressing  Intervention: Promote and Optimize Oral Intake  Description: Perform a nutrition assessment; include a nutrition-focused physical exam.  Determine calorie, protein, vitamin, mineral and fluid requirements.  Assess for micronutrient deficiencies; supplement if depleted.  Assess need and assist with meal set-up and feeding.  Adjust diet or meal schedule based on preferences and tolerance.  Offer oral supplemental food or drinks to enhance calorie and protein intake.  Establish bowel elimination program to increase comfort and appetite.  Minimize unnecessary dietary restrictions to increase oral intake.  Provide and encourage oral hygiene to enhance desire to eat.  Consider enteral nutrition support if oral intake remains inadequate; provide parenteral nutrition if enteral is contraindicated.  Recent Flowsheet Documentation  Taken 2/12/2024 0000 by Asiya Tse RN  Oral Nutrition Promotion:   rest periods promoted   safe use of adaptive equipment encouraged  Intervention: Optimize Skin Protection  Description: Perform a full pressure injury risk assessment, as indicated by screening, upon admission to care unit.  Reassess skin (injury risk, full inspection) frequently (e.g., scheduled interval, with change in condition) to  provide optimal early detection and prevention.  Maintain adequate tissue perfusion (e.g., encourage fluid balance; avoid crossing legs, constrictive clothing or devices) to promote tissue oxygenation.  Maintain head of bed at lowest degree of elevation tolerated, considering medical condition and other restrictions.  Avoid positioning onto an area that remains reddened.  Minimize incontinence and moisture (e.g., toileting schedule; moisture-wicking pad, diaper or incontinence collection device; skin moisture barrier).  Cleanse skin promptly and gently when soiled utilizing a pH-balanced cleanser.  Relieve and redistribute pressure (e.g., scheduled position changes, weight shifts, use of support surface, medical device repositioning, protective dressing application, use of positioning device, microclimate control, use of pressure-injury-monitor  Encourage increased activity, such as sitting in a chair at the bedside or early mobilization, when able to tolerate.  Recent Flowsheet Documentation  Taken 2/12/2024 0200 by Asiya Tse RN  Head of Bed (HOB) Positioning: HOB elevated  Taken 2/12/2024 0000 by Asiya Tse RN  Pressure Reduction Techniques:   frequent weight shift encouraged   weight shift assistance provided  Head of Bed (HOB) Positioning: HOB elevated  Pressure Reduction Devices:   positioning supports utilized   pressure-redistributing mattress utilized  Skin Protection:   adhesive use limited   incontinence pads utilized   tubing/devices free from skin contact   transparent dressing maintained  Taken 2/11/2024 2200 by Asiya Tse RN  Head of Bed (HOB) Positioning: HOB elevated  Taken 2/11/2024 2000 by Asiya Tse RN  Head of Bed ([facility]) Positioning: HOB at 20-30 degrees  Skin Protection:   adhesive use limited   incontinence pads utilized   transparent dressing maintained   tubing/devices free from skin contact

## 2024-02-12 NOTE — DISCHARGE PLACEMENT REQUEST
"Tony Casey (83 y.o. Male)       Date of Birth   1940    Social Security Number       Address   [address] [place] [address]    Home Phone   [phone]    MRN   [id number]       [employer] orth[employer]   [employer]ity    Marital Status   Single                            Admission Date   2/10/24    Admission Type   Emergency    Admitting Provider   Elizabet Parra MD    Attending Provider   Olaf Garzon MD    Department, Room/Bed   [address], [address]/1       Discharge Date       Discharge Disposition       Discharge Destination                                 Attending Provider: Olaf Garzon MD    Allergies: Daliresp [Roflumilast], Latex, Sulfa Antibiotics    Isolation: None   Infection: None   Code Status: CPR    Ht: 180.3 cm (71\")   Wt: 82.6 kg (182 lb 1.6 oz)    Admission Cmt: None   Principal Problem: Acute exacerbation of chronic obstructive pulmonary disease (COPD) [J44.1]                   Active Insurance as of 2/10/2024       Primary Coverage       Payor Plan Insurance Group Employer/Plan Group    MEDICARE MEDICARE A & B        Payor Plan Address Payor Plan Phone Number Payor Plan Fax Number Effective Dates    [address] [phone]  5/1/2003 - None Entered    [facility] [id number]         Subscriber Name Subscriber Birth Date Member ID       TONY CASEY 1940 [id number]               Secondary Coverage       Payor Plan Insurance Group Employer/Plan Group     FOR LIFE  FOR LIFE [employer] SUP         Payor Plan Address Payor Plan Phone Number Payor Plan Fax Number Effective Dates    [address] [phone]  3/10/2016 - None Entered    [facility] [id number]         Subscriber Name Subscriber Birth Date Member ID       TONY CASEY 1940 [id number]                     Emergency Contacts        (Rel.) Home Phone Work Phone Mobile Phone    VICKY CASEY (Son) [phone] -- [phone]    Horacio,Frederick (Son) " [phone] -- --    Ricardo Leonard (Son) [phone] -- [phone]

## 2024-02-12 NOTE — CASE MANAGEMENT/SOCIAL WORK
Discharge Planning Assessment  [facility]     Patient Name: Tony Leonard  MRN: [id number]  Today's Date: 2/12/2024    Admit Date: 2/10/2024    Plan: Home w/ HH (pending)   Discharge Needs Assessment       Row Name 02/12/24 1406       Living Environment    People in Home alone    Current Living Arrangements independent living facility    Primary Care Provided by self    Provides Primary Care For no one    Family Caregiver if Needed child(brian), adult    Able to Return to Prior Arrangements yes       Transition Planning    Patient/Family Anticipates Transition to home with help/services    Patient/Family Anticipated Services at Transition home health care    Transportation Anticipated family or friend will provide       Discharge Needs Assessment    Equipment Currently Used at Home rollator    Concerns to be Addressed discharge planning                   Discharge Plan       Row Name 02/12/24 7554       Plan    Plan Home w/ HH (pending)    Patient/Family in Agreement with Plan yes    Plan Comments CCP spoke with patient at bedside; explained role, verified facesheet, and discussed dc plan. Patient uses a rollator at home. He lives in an independent living apartment ([place]). [place] apartments have an elevator to enter. He has used [facility] in the past. He has been to [facility] in the past. Patient states his preffered plan is to return home instead of going to short term rehab. PT eval is pending. CCP explained that patient is currently in observation status and short term skilled rehab would not be covered through his Medicare. CCP discussed private paying for short term rehab - patient states this is not an option. He is agreeable to HH and is requesting a referral to [facility]. CCP will follow for PT evaluation / any reccomendations. JUSTYN, RUTHIE                  Continued Care and Services - Admitted Since 2/10/2024    Coordination has not been started for this encounter.       Selected Continued Care  - Episodes Includes continued care and service providers with selected services from the active episodes listed below      High Risk Care Management Episode start date: 12/13/2022   There are no active outsourced providers for this episode.                 Selected Continued Care - Prior Encounters Includes continued care and service providers with selected services from prior encounters from 11/12/2023 to 2/12/2024      Discharged on 12/30/2023 Admission date: 12/26/2023 - Discharge disposition: Skilled Nursing Facility (DC - External)      Destination       Service Provider Selected Services Address Phone Fax Patient Preferred    [facility] [address] [address] [phone] --                             Demographic Summary       Row Name 02/12/24 1405       General Information    Admission Type observation    Arrived From home    Referral Source admission list    Reason for Consult discharge planning    Preferred Language English       Contact Information    Permission Granted to Share Info With family/designee                   Functional Status       Row Name 02/12/24 1406       Functional Status    Usual Activity Tolerance fair    Current Activity Tolerance fair       Functional Status, IADL    Medications assistive equipment    Meal Preparation assistive equipment    Housekeeping assistive equipment    Laundry assistive equipment    Shopping assistive equipment       Mental Status    General Appearance WDL WDL                   Psychosocial    No documentation.                  Abuse/Neglect    No documentation.                  Legal    No documentation.                  Substance Abuse    No documentation.                  Patient Forms    No documentation.                     RUTHIE Gutierrez

## 2024-02-12 NOTE — CONSULTS
Patient Care Team:  Harpreet Kate MD as PCP - General (Internal Medicine)  Katharina Salter MD as Consulting Physician (Pulmonary Disease)  Anum Bhatt MD as Consulting Physician (Pain Medicine)  Azeb Cook, RN as Ambulatory  ([facility] [place])      Subjective     Patient is a 83 y.o. male.  Asked to see regarding shortness of breath patient has a history of COPD chronic nocturnal hypoxemia on 2 L O2 at home with sleep.  He is got bronchiectasis as well prior pneumonias, CHF, ADHD, spinal stenosis with neurogenic claudication and history of prostate cancer.  He was admitted on 2/10 after presenting to the emergency room with cough and shortness of breath and wheezing of a few days duration he been seen 4 days prior in the ED for this and was sent home after initially improving he started feeling worse again today admission.  He was admitted for an acute exacerbation of COPD .  Awake he has been saturating in the mid to upper 90s on room air.  Minimally elevated troponin on admission that fell normal lactate normal white count at a normal respiratory pathogen panel back on 2/6 when he came to the ER.  On 2/6 his chest x-ray they question some vague right infiltrate but on follow-up film on 210 that seem to have improved/cleared.  EKG was sinus rhythm.  Patient is concerned with recurrent episode he had 1 on 2 /6 and now 2/10 sounds like these come on fairly acutely he relates that he feels he has a lot of nasal congestion and drainage postnasal drip gets down in his throat and blocks him up he cannot clear out he does saline nasal lavages regularly.  He clears up pretty quickly he feels like he is almost back to baseline now.  He had no fevers chills or sweats with this his nasal congestion is a chronic problem.  He also is having some reflux says when he eats hot [place] foods which may be he ate the day before these events as well.  He is also been having some increasing  swelling in his extremities he has chronic heart failure he is supposed to be on Lasix but he stopped taking it because it made him urinate too much.  He was normal on both 2/10 and 2/6 presentations.  No nausea no vomiting no diarrhea no chest pain pressure or heaviness no palpitations      Review of Systems:        History  Past Medical History:   Diagnosis Date    ADHD (attention deficit hyperactivity disorder)     Bronchiectasis     CHF (congestive heart failure)     Colon polyp     COPD (chronic obstructive pulmonary disease)     Diverticulosis     Hard of hearing     On home oxygen therapy     2 LITERS    Pneumonia     Prostate cancer 04/22/2019    Spinal stenosis, lumbar region with neurogenic claudication 08/02/2022     Past Surgical History:   Procedure Laterality Date    BRONCHOSCOPY N/A 4/30/2016    Procedure: BRONCHOSCOPY with BAL of right lower lobe and left  lower lobe.  ;  Surgeon: Nando Diaz MD;  Location: [facility] ENDOSCOPY;  Service:     BRONCHOSCOPY N/A 4/28/2017    Procedure: BRONCHOSCOPY;  Surgeon: Katharina Salter MD;  Location: [facility] ENDOSCOPY;  Service:     BRONCHOSCOPY Bilateral 6/29/2017    Procedure: BRONCHOSCOPY WITH WASHINGS;  Surgeon: Katharina Salter MD;  Location: [facility] ENDOSCOPY;  Service:     BRONCHOSCOPY N/A 1/22/2018    Procedure: BRONCHOSCOPY AT BEDSIDE with BAL;  Surgeon: Katharina Salter MD;  Location: [facility] ENDOSCOPY;  Service:     BRONCHOSCOPY N/A 1/30/2018    Procedure: BRONCHOSCOPY with BAL and washing;  Surgeon: Shreyas Wild MD;  Location: [facility] ENDOSCOPY;  Service:     BRONCHOSCOPY Bilateral 4/24/2018    Procedure: BRONCHOSCOPY WITH WASHING;  Surgeon: Katharina Salter MD;  Location: [facility] ENDOSCOPY;  Service: Pulmonary    BRONCHOSCOPY N/A 12/28/2019    Procedure: BRONCHOSCOPY with bilateral washing;  Surgeon: Katharina Salter MD;  Location: [facility] ENDOSCOPY;  Service: Pulmonary    BRONCHOSCOPY Bilateral 1/4/2023    Procedure: BRONCHOSCOPY with lavage;  Surgeon: Gaetano  Katharina GUILLAUME MD;  Location: [facility] ENDOSCOPY;  Service: Pulmonary;  Laterality: Bilateral;  mucus plugging    CARDIAC CATHETERIZATION N/A 1/29/2023    Procedure: Left Heart Cath;  Surgeon: Johnnie Ang MD;  [facility];  Service: Cardiology;  Laterality: N/A;    CARDIAC CATHETERIZATION N/A 1/29/2023    Procedure: Coronary angiography;  Surgeon: Johnnie Ang MD;  Location: [facility] CATH INVASIVE LOCATION;  Service: Cardiology;  Laterality: N/A;    COLONOSCOPY  05/17/2013    eh, ih, tort, sig tics    COLONOSCOPY N/A 5/10/2019    Non-thrombosed external hemorrhoids found on perianal exam, Diverticulosis, Tortuous colon, One 5 mm polyp in the mid ascending colon, IH. Path: Tubular adenoma.     COLONOSCOPY N/A 9/24/2022    Procedure: COLONOSCOPY to cecum and TI with argon plasma coagulation.;  Surgeon: Bruce Black MD;  Location: [facility] ENDOSCOPY;  Service: Gastroenterology;  Laterality: N/A;  pre- GI bleeding  post- radiation proctitis, diverticulosis    ENDOSCOPY  03/19/2015    z line irreg, hh    ENDOSCOPY N/A 9/24/2022    Procedure: ESOPHAGOGASTRODUODENOSCOPY;  Surgeon: Bruce Black MD;  Location: [facility] ENDOSCOPY;  Service: Gastroenterology;  Laterality: N/A;  pre- GI bleeding  post- esophagitis, hiatal hernia    HIP OPEN REDUCTION Right 1/17/2018    Procedure: HIP OPEN REDUCTION INTERNAL FIXATION WITH DYNAMIC HIP SCREW;  Surgeon: Les Black MD;  Location: [facility] OR;  Service:     SPINE SURGERY      TONSILLECTOMY      TOTAL HIP ARTHROPLASTY REVISION Right 9/23/2019    Procedure: HIP  REVISION RIGHT;  Surgeon: Isauro Warner MD;  Location: [facility] MAIN OR;  Service: Orthopedics     Social History     Socioeconomic History    Marital status: Single   Tobacco Use    Smoking status: Never    Smokeless tobacco: Never   Vaping Use    Vaping Use: Never used   Substance and Sexual Activity    Alcohol use: No     Comment: red wine occassional    Drug use: No     Sexual activity: Defer     Family History   Problem Relation Age of Onset    Thyroid disease Mother     No Known Problems Father     Malig Hyperthermia Neg Hx          Allergies:  Daliresp [roflumilast], Latex, and Sulfa antibiotics    Medications:  Prior to Admission medications    Medication Sig Start Date End Date Taking? Authorizing Provider   acetaminophen (TYLENOL) 325 MG tablet Take 2 tablets by mouth Every 4 (Four) Hours As Needed for Mild Pain. 12/30/23  Yes Eduardo Hanks MD   albuterol (PROVENTIL) (2.5 MG/3ML) 0.083% nebulizer solution Take 2.5 mg by nebulization Every 6 (Six) Hours As Needed for Wheezing. 11/19/22  Yes Scar Tirado,    calcium carbonate (TUMS) 500 MG chewable tablet Chew 1 tablet As Needed for Indigestion or Heartburn.   Yes ProviderNick MD   dicyclomine (BENTYL) 10 MG capsule Take 1 capsule by mouth 3 (Three) Times a Day As Needed (abdominal bloating). 10/18/23  Yes Gregory Martin MD   FLUoxetine (PROzac) 20 MG capsule TAKE 1 CAPSULE DAILY 1/26/23  Yes Erich Aviles MD   gabapentin (NEURONTIN) 300 MG capsule Take 1 capsule by mouth 3 (Three) Times a Day. 12/30/23  Yes Eduardo Hanks MD   guaiFENesin (MUCINEX) 600 MG 12 hr tablet Take 1 tablet by mouth Every 12 (Twelve) Hours. 12/30/23  Yes Eduardo Hanks MD   guaiFENesin-dextromethorphan (ROBITUSSIN DM) 100-10 MG/5ML syrup Take 5 mL by mouth Every 4 (Four) Hours As Needed for Cough. 12/30/23  Yes Eduardo Hanks MD   Multiple Vitamins-Minerals (MULTIVITAMIN ADULT PO) Take 1 tablet by mouth Daily.   Yes ProviderNick MD   ondansetron ODT (ZOFRAN-ODT) 4 MG disintegrating tablet Place 1 tablet on the tongue Every 6 (Six) Hours As Needed for Nausea or Vomiting. 12/30/23  Yes Eduardo Hanks MD   pantoprazole (PROTONIX) 40 MG EC tablet TAKE 1 TABLET DAILY ON EMPTY STOMACH HALF AN HOUR BEFORE MEAL 9/27/23  Yes Rowdy Mattson MD   tiZANidine (ZANAFLEX) 2 MG tablet Take 1 tablet by mouth At Night As Needed for  "Muscle Spasms. 7/14/23  Yes Josue Kamara MD   Umeclidinium-Vilanterol (ANORO ELLIPTA) 62.5-25 MCG/ACT aerosol powder  inhaler Inhale 1 puff Daily.   Yes Provider, MD Nick     arformoterol, 15 mcg, Nebulization, BID - RT   And  tiotropium bromide monohydrate, 2 puff, Inhalation, Daily - RT  azithromycin, 500 mg, Intravenous, Q24H  enoxaparin, 40 mg, Subcutaneous, Nightly  FLUoxetine, 20 mg, Oral, Daily  gabapentin, 300 mg, Oral, TID  guaiFENesin, 600 mg, Oral, Q12H  ipratropium-albuterol, 3 mL, Nebulization, Q6H While Awake - RT  methylPREDNISolone sodium succinate, 40 mg, Intravenous, Q8H  multivitamin with minerals, 1 tablet, Oral, Daily  pantoprazole, 40 mg, Oral, Q AM           Objective     Vital Signs  Vital Sign Min/Max for last 24 hours  Temp  Min: 97.5 °F (36.4 °C)  Max: 98.7 °F (37.1 °C)   BP  Min: 104/57  Max: 137/73   Pulse  Min: 59  Max: 91   Resp  Min: 16  Max: 20   SpO2  Min: 94 %  Max: 99 %   No data recorded   Weight  Min: 82.6 kg (182 lb 1.6 oz)  Max: 82.6 kg (182 lb 1.6 oz)       Intake/Output Summary (Last 24 hours) at 2/12/2024 1557  Last data filed at 2/12/2024 1512  Gross per 24 hour   Intake 1440 ml   Output 1375 ml   Net 65 ml     I/O this shift:  In: 960 [P.O.:960]  Out: 925 [Urine:925]  Last Weight and Admission Weight        02/12/24  0533   Weight: 82.6 kg (182 lb 1.6 oz)     Flowsheet Rows      Flowsheet Row First Filed Value   Admission Height 180.3 cm (71\") Documented at 02/10/2024 1543   Admission Weight 85.7 kg (189 lb) Documented at 02/10/2024 1543            Body mass index is 25.4 kg/m².           Physical Exam:  General Appearance: Well-developed elderly white male looks even older than his stated age  Eyes: Conjunctiva are clear and anicteric  ENT: Mucous membranes are moist no exudates  Neck: No jugular venous tension and trachea midline  Lungs: Clear nonlabored symmetric expansion no wheezes no rales no rhonchi  Cardiac: Regular rate rhythm no murmur no " gallop  Abdomen: Soft no palpable hepatosplenomegaly or masses  : Not examined  Musculoskeletal: Grossly normal  Skin: Warm and dry no jaundice no petechiae  Neuro: He is alert and oriented cooperative following commands and grossly intact  Extremities/P Vascular: No clubbing no cyanosis he does have trace to 1+ lower extremity edema bilaterally  MSE: Patient  to be in pretty good spirits      Labs:  Results from last 7 days   Lab Units 02/12/24  0506 02/11/24  0327 02/10/24  1617 02/06/24  2038   GLUCOSE mg/dL 163* 119* 104* 102*   SODIUM mmol/L 139 140 141 138   POTASSIUM mmol/L 4.1 4.6 4.3 4.5   CO2 mmol/L 22.5 19.0* 22.2 22.0   CHLORIDE mmol/L 106 103 102 104   ANION GAP mmol/L 10.5 18.0* 16.8* 12.0   CREATININE mg/dL 0.96 0.90 1.02 1.02   BUN mg/dL 24* 23 25* 21   BUN / CREAT RATIO  25.0 25.6* 24.5 20.6   CALCIUM mg/dL 8.4* 8.8 9.1 9.1   ALK PHOS U/L 54  --  72 64   TOTAL PROTEIN g/dL 6.1  --  7.4 6.3   ALT (SGPT) U/L 14  --  21 15   AST (SGOT) U/L 13  --  25 23   BILIRUBIN mg/dL 0.3  --  0.6 0.4   ALBUMIN g/dL 3.6  --  4.3 4.0   GLOBULIN gm/dL 2.5  --  3.1 2.3     Estimated Creatinine Clearance: 65.7 mL/min (by C-G formula based on SCr of 0.96 mg/dL).      Results from last 7 days   Lab Units 02/12/24  0506 02/11/24  0327 02/10/24  1617 02/06/24  2038   WBC 10*3/mm3 7.69 3.22* 6.91 7.04   RBC 10*6/mm3 3.55* 4.04* 4.41 3.82*   HEMOGLOBIN g/dL 9.5* 10.6* 11.7* 10.3*   HEMATOCRIT % 29.6* 33.2* 37.1* 32.4*   MCV fL 83.4 82.2 84.1 84.8   MCH pg 26.8 26.2* 26.5* 27.0   MCHC g/dL 32.1 31.9 31.5 31.8   RDW % 14.5 14.5 14.7 14.7   RDW-SD fl 44.2 43.0 45.3 44.9   MPV fL 9.4 9.9 9.0 9.5   PLATELETS 10*3/mm3 255 265 300 265   NEUTROPHIL % % 90.5*  --  57.7 55.8   LYMPHOCYTE % % 6.8*  --  24.6 22.0   MONOCYTES % % 2.3*  --  9.3 13.1*   EOSINOPHIL % % 0.0*  --  7.1* 8.1*   BASOPHIL % % 0.0  --  1.0 0.7   IMM GRAN % % 0.4  --  0.3 0.3   NEUTROS ABS 10*3/mm3 6.96  --  3.99 3.93   LYMPHS ABS 10*3/mm3 0.52*  --  1.70 1.55    MONOS ABS 10*3/mm3 0.18  --  0.64 0.92*   EOS ABS 10*3/mm3 0.00  --  0.49* 0.57*   BASOS ABS 10*3/mm3 0.00  --  0.07 0.05   IMMATURE GRANS (ABS) 10*3/mm3 0.03  --  0.02 0.02   NRBC /100 WBC 0.1  --  0.0 0.0         Results from last 7 days   Lab Units 02/10/24  1846 02/10/24  1617 02/06/24  2038   HSTROP T ng/L 31* 42* 31*     Results from last 7 days   Lab Units 02/10/24  1617 02/06/24 2038   PROBNP pg/mL 115.0 597.0         Results from last 7 days   Lab Units 02/10/24  1617 02/06/24 2228 02/06/24 2038   LACTATE mmol/L 1.5 1.2  --    PROCALCITONIN ng/mL  --   --  0.06         Microbiology Results (last 10 days)       Procedure Component Value - Date/Time    Blood Culture - Blood, Arm, Left [[id number]]  (Normal) Collected: 02/10/24 1618    Lab Status: Preliminary result Specimen: Blood from Arm, Left Updated: 02/11/24 1632     Blood Culture No growth at 24 hours    Blood Culture - Blood, Arm, Right [[id number]]  (Normal) Collected: 02/10/24 1618    Lab Status: Preliminary result Specimen: Blood from Arm, Right Updated: 02/11/24 1632     Blood Culture No growth at 24 hours    Respiratory Panel PCR w/COVID-19(SARS-CoV-2) JARRETT/AMPARO/ROYAL/PAD/COR/JERMAIN In-House, NP Swab in UTM/VTM, 2 HR TAT - Swab, Nasopharynx [[id number]]  (Normal) Collected: 02/06/24 2034    Lab Status: Final result Specimen: Swab from Nasopharynx Updated: 02/06/24 2144     ADENOVIRUS, PCR Not Detected     Coronavirus 229E Not Detected     Coronavirus HKU1 Not Detected     Coronavirus NL63 Not Detected     Coronavirus OC43 Not Detected     COVID19 Not Detected     Human Metapneumovirus Not Detected     Human Rhinovirus/Enterovirus Not Detected     Influenza A PCR Not Detected     Influenza B PCR Not Detected     Parainfluenza Virus 1 Not Detected     Parainfluenza Virus 2 Not Detected     Parainfluenza Virus 3 Not Detected     Parainfluenza Virus 4 Not Detected     RSV, PCR Not Detected     Bordetella pertussis pcr Not Detected     Bordetella  parapertussis PCR Not Detected     Chlamydophila pneumoniae PCR Not Detected     Mycoplasma pneumo by PCR Not Detected    Narrative:      In the setting of a positive respiratory panel with a viral infection PLUS a negative procalcitonin without other underlying concern for bacterial infection, consider observing off antibiotics or discontinuation of antibiotics and continue supportive care. If the respiratory panel is positive for atypical bacterial infection (Bordetella pertussis, Chlamydophila pneumoniae, or Mycoplasma pneumoniae), consider antibiotic de-escalation to target atypical bacterial infection.              Diagnostics:  XR Chest 1 View    Result Date: 2/10/2024  XR CHEST 1 VW-  Clinical: COPD, cough  COMPARISON examination dated 2/6/2024  FINDINGS: There has been some clearing of the vague right lung infiltrate within the interim. The cardiomediastinal silhouette is stable. No vascular congestion or new airspace disease has developed. No gross pleural effusion seen. The remainder is unremarkable.  This report was finalized on 2/10/2024 5:31 PM by Dr. Richard Solitario M.D on Workstation: [id number]      XR Chest 1 View    Result Date: 2/6/2024  Portable chest x-ray  HISTORY: Shortness of breath, COPD and heart failure.  TECHNIQUE: Portable chest x-ray correlated with chest x-ray December 22, 2023.  FINDINGS: Normal cardiomediastinal silhouette. Abnormal asymmetric density in the perihilar right lung and in the right upper lung zone most likely representing pneumonia. The left lung appears clear.      New areas of abnormal density in the right lung suspected represent pneumonia.  This report was finalized on 2/6/2024 9:12 PM by Dr. Lennox Smith M.D on Workstation: [id number]      Results for orders placed during the hospital encounter of 12/21/23    Adult Transthoracic Echo Complete W/ Cont if Necessary Per Protocol    Interpretation Summary    Left ventricular ejection fraction appears to be 56 - 60%.     Left ventricular diastolic function was normal.    Moderate dilation of the aortic root is present. Mild dilation of the sinuses of Valsalva is present.          Assessment & Plan     Questionable acute exacerbation COPD sounds like he just got choked up either with postnasal drainage and may be some reflux had some rather acute issues he is much better now he says he cleared up in the emergency room he says he cleared up right after the shot of steroids in the emergency department.  Steroids do not work that quickly.  It does not sound like an infectious process.  I think we increase his guaifenesin have him take it regularly at home continue saline nasal lavages ,he is already on a PPI for his reflux ask him not to eat foods that cause reflux.  Nocturnal hypoxemia on 2 L O2 at home with sleep continue.  ADHD he does seem to have a lot of anxiety and may be related to this and this may play a role in his acute events.  Gastroesophageal reflux disease on PPI watch diet  Chronic heart failure preserved ejection fraction      Pablo Sanabria Jr, MD  02/12/24  15:57 EST    Time:

## 2024-02-12 NOTE — PLAN OF CARE
Goal Outcome Evaluation:  Plan of Care Reviewed With: patient    Progress: improving  Outcome Evaluation: Mr. Leonard admitted on 2/10 for COPD exacerbation. IV Solumedrol transitioned to PO Prednisone. On room air. C/o of mild lower back pain and leg pain - denied pain medication. Up in chair , standby assist. Dry coughing and wheezing intermittently. No family at bedside. Patient stable and needs met at this time.

## 2024-02-12 NOTE — PROGRESS NOTES
Name: Tony Leonard ADMIT: 2/10/2024   : 1940  PCP: Harpreet Kate MD    MRN: [id number] LOS: 0 days   AGE/SEX: 83 y.o. male  ROOM: [facility]     Subjective   Subjective   Patient is seen at bedside, no new complaints.       Objective   Objective   Vital Signs  Temp:  [97.5 °F (36.4 °C)-98.7 °F (37.1 °C)] 97.5 °F (36.4 °C)  Heart Rate:  [59-91] 91  Resp:  [16-20] 18  BP: (104-137)/(53-73) 137/73  SpO2:  [94 %-99 %] 95 %  on   ;   Device (Oxygen Therapy): room air  Body mass index is 25.4 kg/m².  Physical Exam  General, awake and alert.  Head and ENT, normocephalic and atraumatic.  Lungs, symmetric expansion, equal air entry bilaterally.  Heart, regular rate and rhythm.  Abdomen, soft and nontender.  Extremities, no clubbing or cyanosis.  Neuro, no focal deficits.  Skin: Warm and no rash.  Psych, normal mood and affect.  Musculoskeletal, joint examination is grossly normal.      Copied text material from yesterday's note has been reviewed for appropriate changes and remains accurate as of 24.      Results Review     I reviewed the patient's new clinical results.  Results from last 7 days   Lab Units 24  0506 02/11/24  0327 02/10/24  1617 02/06/24  2038   WBC 10*3/mm3 7.69 3.22* 6.91 7.04   HEMOGLOBIN g/dL 9.5* 10.6* 11.7* 10.3*   PLATELETS 10*3/mm3 255 265 300 265     Results from last 7 days   Lab Units 24  0506 24  0327 02/10/24  1617 02/06/24  2038   SODIUM mmol/L 139 140 141 138   POTASSIUM mmol/L 4.1 4.6 4.3 4.5   CHLORIDE mmol/L 106 103 102 104   CO2 mmol/L 22.5 19.0* 22.2 22.0   BUN mg/dL 24* 23 25* 21   CREATININE mg/dL 0.96 0.90 1.02 1.02   GLUCOSE mg/dL 163* 119* 104* 102*   EGFR mL/min/1.73 77.5 83.7 72.0 72.0     Results from last 7 days   Lab Units 24  0506 02/10/24  1617 24  2038   ALBUMIN g/dL 3.6 4.3 4.0   BILIRUBIN mg/dL 0.3 0.6 0.4   ALK PHOS U/L 54 72 64   AST (SGOT) U/L 13 25 23   ALT (SGPT) U/L 14 21 15     Results from last 7 days   Lab Units  "02/12/24  0506 02/11/24  0327 02/10/24  1617 02/06/24 2038   CALCIUM mg/dL 8.4* 8.8 9.1 9.1   ALBUMIN g/dL 3.6  --  4.3 4.0     Results from last 7 days   Lab Units 02/10/24  1617 02/06/24  2228 02/06/24 2038   PROCALCITONIN ng/mL  --   --  0.06   LACTATE mmol/L 1.5 1.2  --      No results found for: \"HGBA1C\", \"POCGLU\"    No radiology results for the last day    I have personally reviewed all medications:  Scheduled Medications  arformoterol, 15 mcg, Nebulization, BID - RT   And  tiotropium bromide monohydrate, 2 puff, Inhalation, Daily - RT  azithromycin, 500 mg, Intravenous, Q24H  enoxaparin, 40 mg, Subcutaneous, Nightly  FLUoxetine, 20 mg, Oral, Daily  gabapentin, 300 mg, Oral, TID  guaiFENesin, 600 mg, Oral, Q12H  ipratropium-albuterol, 3 mL, Nebulization, Q6H While Awake - RT  multivitamin with minerals, 1 tablet, Oral, Daily  pantoprazole, 40 mg, Oral, Q AM  [START ON 2/13/2024] predniSONE, 40 mg, Oral, Daily With Breakfast    Infusions   Diet  Diet: Cardiac Diets; Healthy Heart (2-3 Na+); Texture: Regular Texture (IDDSI 7); Fluid Consistency: Thin (IDDSI 0)    I have personally reviewed:  [x]  Laboratory   [x]  Microbiology   [x]  Radiology   [x]  EKG/Telemetry  [x]  Cardiology/Vascular   []  Pathology    []  Records       Assessment/Plan     Active Hospital Problems    Diagnosis  POA    **Acute exacerbation of chronic obstructive pulmonary disease (COPD) [J44.1]  Yes    Paroxysmal atrial fibrillation [I48.0]  Yes    HTN (hypertension) [I10]  Yes    Chronic diastolic CHF (congestive heart failure) [I50.32]  Yes    Mild malnutrition [E44.1]  Yes    Dyslipidemia [E78.5]  Yes      Resolved Hospital Problems   No resolved problems to display.       83 y.o. male admitted with Acute exacerbation of chronic obstructive pulmonary disease (COPD).    Assessment and plan  1.  Acute exacerbation of COPD, acute hypoxemic respiratory failure, continue steroids, pulmonary consultation requested.  Follow management " recommendations, continue oxygen supplementation as needed.  Continue breathing treatments.     2.  Paroxysmal atrial fibrillation, patient is currently rate controlled.     3. Chronic diastolic CHF, patient currently appears euvolemic.     4.  CODE STATUS is full code.  Further plans based on hospital course.         Olaf Garzon MD  [facility]  02/12/24  16:37 EST

## 2024-02-13 ENCOUNTER — TELEPHONE (OUTPATIENT)
Dept: FAMILY MEDICINE CLINIC | Facility: CLINIC | Age: 84
End: 2024-02-13

## 2024-02-13 LAB
ALBUMIN SERPL-MCNC: 3.4 G/DL (ref 3.5–5.2)
ALBUMIN/GLOB SERPL: 1.4 G/DL
ALP SERPL-CCNC: 52 U/L (ref 39–117)
ALT SERPL W P-5'-P-CCNC: 12 U/L (ref 1–41)
ANION GAP SERPL CALCULATED.3IONS-SCNC: 9.3 MMOL/L (ref 5–15)
AST SERPL-CCNC: 11 U/L (ref 1–40)
BASOPHILS # BLD AUTO: 0.01 10*3/MM3 (ref 0–0.2)
BASOPHILS NFR BLD AUTO: 0.1 % (ref 0–1.5)
BILIRUB SERPL-MCNC: 0.2 MG/DL (ref 0–1.2)
BUN SERPL-MCNC: 24 MG/DL (ref 8–23)
BUN/CREAT SERPL: 24.5 (ref 7–25)
CALCIUM SPEC-SCNC: 8.4 MG/DL (ref 8.6–10.5)
CHLORIDE SERPL-SCNC: 108 MMOL/L (ref 98–107)
CO2 SERPL-SCNC: 21.7 MMOL/L (ref 22–29)
CREAT SERPL-MCNC: 0.98 MG/DL (ref 0.76–1.27)
DEPRECATED RDW RBC AUTO: 43.9 FL (ref 37–54)
EGFRCR SERPLBLD CKD-EPI 2021: 75.6 ML/MIN/1.73
EOSINOPHIL # BLD AUTO: 0 10*3/MM3 (ref 0–0.4)
EOSINOPHIL NFR BLD AUTO: 0 % (ref 0.3–6.2)
ERYTHROCYTE [DISTWIDTH] IN BLOOD BY AUTOMATED COUNT: 14.4 % (ref 12.3–15.4)
GLOBULIN UR ELPH-MCNC: 2.4 GM/DL
GLUCOSE SERPL-MCNC: 116 MG/DL (ref 65–99)
HCT VFR BLD AUTO: 30.9 % (ref 37.5–51)
HGB BLD-MCNC: 9.9 G/DL (ref 13–17.7)
IMM GRANULOCYTES # BLD AUTO: 0.05 10*3/MM3 (ref 0–0.05)
IMM GRANULOCYTES NFR BLD AUTO: 0.5 % (ref 0–0.5)
LYMPHOCYTES # BLD AUTO: 0.8 10*3/MM3 (ref 0.7–3.1)
LYMPHOCYTES NFR BLD AUTO: 8.7 % (ref 19.6–45.3)
MCH RBC QN AUTO: 26.8 PG (ref 26.6–33)
MCHC RBC AUTO-ENTMCNC: 32 G/DL (ref 31.5–35.7)
MCV RBC AUTO: 83.5 FL (ref 79–97)
MONOCYTES # BLD AUTO: 0.35 10*3/MM3 (ref 0.1–0.9)
MONOCYTES NFR BLD AUTO: 3.8 % (ref 5–12)
NEUTROPHILS NFR BLD AUTO: 7.98 10*3/MM3 (ref 1.7–7)
NEUTROPHILS NFR BLD AUTO: 86.9 % (ref 42.7–76)
NRBC BLD AUTO-RTO: 0 /100 WBC (ref 0–0.2)
PLATELET # BLD AUTO: 267 10*3/MM3 (ref 140–450)
PMV BLD AUTO: 9.7 FL (ref 6–12)
POTASSIUM SERPL-SCNC: 4.1 MMOL/L (ref 3.5–5.2)
PROT SERPL-MCNC: 5.8 G/DL (ref 6–8.5)
RBC # BLD AUTO: 3.7 10*6/MM3 (ref 4.14–5.8)
SODIUM SERPL-SCNC: 139 MMOL/L (ref 136–145)
WBC NRBC COR # BLD AUTO: 9.19 10*3/MM3 (ref 3.4–10.8)

## 2024-02-13 PROCEDURE — 85025 COMPLETE CBC W/AUTO DIFF WBC: CPT | Performed by: INTERNAL MEDICINE

## 2024-02-13 PROCEDURE — 94799 UNLISTED PULMONARY SVC/PX: CPT

## 2024-02-13 PROCEDURE — 63710000001 PREDNISONE PER 1 MG: Performed by: INTERNAL MEDICINE

## 2024-02-13 PROCEDURE — 94664 DEMO&/EVAL PT USE INHALER: CPT

## 2024-02-13 PROCEDURE — 25010000002 ENOXAPARIN PER 10 MG: Performed by: INTERNAL MEDICINE

## 2024-02-13 PROCEDURE — 97162 PT EVAL MOD COMPLEX 30 MIN: CPT

## 2024-02-13 PROCEDURE — 80053 COMPREHEN METABOLIC PANEL: CPT | Performed by: INTERNAL MEDICINE

## 2024-02-13 PROCEDURE — 97530 THERAPEUTIC ACTIVITIES: CPT

## 2024-02-13 RX ORDER — PREDNISONE 20 MG/1
20 TABLET ORAL
Qty: 7 TABLET | Refills: 0 | Status: SHIPPED | OUTPATIENT
Start: 2024-02-14 | End: 2024-02-21

## 2024-02-13 RX ORDER — ACETYLCYSTEINE 200 MG/ML
3 SOLUTION ORAL; RESPIRATORY (INHALATION)
Status: DISCONTINUED | OUTPATIENT
Start: 2024-02-13 | End: 2024-02-14 | Stop reason: HOSPADM

## 2024-02-13 RX ADMIN — GUAIFENESIN 1200 MG: 600 TABLET, EXTENDED RELEASE ORAL at 22:20

## 2024-02-13 RX ADMIN — ACETYLCYSTEINE 3 ML: 200 SOLUTION ORAL; RESPIRATORY (INHALATION) at 19:14

## 2024-02-13 RX ADMIN — IPRATROPIUM BROMIDE AND ALBUTEROL SULFATE 3 ML: 2.5; .5 SOLUTION RESPIRATORY (INHALATION) at 06:44

## 2024-02-13 RX ADMIN — TIOTROPIUM BROMIDE INHALATION SPRAY 2 PUFF: 3.12 SPRAY, METERED RESPIRATORY (INHALATION) at 06:45

## 2024-02-13 RX ADMIN — GABAPENTIN 300 MG: 300 CAPSULE ORAL at 22:20

## 2024-02-13 RX ADMIN — PANTOPRAZOLE SODIUM 40 MG: 40 TABLET, DELAYED RELEASE ORAL at 06:35

## 2024-02-13 RX ADMIN — PREDNISONE 40 MG: 20 TABLET ORAL at 09:10

## 2024-02-13 RX ADMIN — GUAIFENESIN 1200 MG: 600 TABLET, EXTENDED RELEASE ORAL at 09:10

## 2024-02-13 RX ADMIN — GABAPENTIN 300 MG: 300 CAPSULE ORAL at 09:10

## 2024-02-13 RX ADMIN — FLUOXETINE HYDROCHLORIDE 20 MG: 20 CAPSULE ORAL at 09:10

## 2024-02-13 RX ADMIN — GABAPENTIN 300 MG: 300 CAPSULE ORAL at 16:27

## 2024-02-13 RX ADMIN — ARFORMOTEROL TARTRATE 15 MCG: 15 SOLUTION RESPIRATORY (INHALATION) at 19:17

## 2024-02-13 RX ADMIN — ARFORMOTEROL TARTRATE 15 MCG: 15 SOLUTION RESPIRATORY (INHALATION) at 06:44

## 2024-02-13 RX ADMIN — IPRATROPIUM BROMIDE AND ALBUTEROL SULFATE 3 ML: 2.5; .5 SOLUTION RESPIRATORY (INHALATION) at 19:11

## 2024-02-13 RX ADMIN — ENOXAPARIN SODIUM 40 MG: 100 INJECTION SUBCUTANEOUS at 22:22

## 2024-02-13 RX ADMIN — ALBUTEROL SULFATE 2.5 MG: 2.5 SOLUTION RESPIRATORY (INHALATION) at 15:42

## 2024-02-13 RX ADMIN — TIZANIDINE 2 MG: 4 TABLET ORAL at 22:21

## 2024-02-13 RX ADMIN — ACETYLCYSTEINE 3 ML: 200 SOLUTION ORAL; RESPIRATORY (INHALATION) at 15:43

## 2024-02-13 RX ADMIN — Medication 1 TABLET: at 09:10

## 2024-02-13 NOTE — PLAN OF CARE
Goal Outcome Evaluation:  Plan of Care Reviewed With: patient              Patient is an 83 y.o. male admitted to [facility] for acute exacerbation of COPD on 2/10/2024. PMHx includes COPD, afib. Patient is ind at baseline with use of rollator and lives alone in ILF. Today, patient performed bed mobility with SBA, required SBA for transfers, and ambulated 120ft using rollator requiring CGA-SBA. Pt impulsive at times and very forward flexed which pt reports is baseline. Strength, postural control, safety awareness deficits noted. Patient may benefit from skilled PT services to address functional deficits and improve level of independence prior to discharge. Anticipate return to ILF with assist from family and HHPT upon DC.      Anticipated Discharge Disposition (PT): home with home health, home with assist

## 2024-02-13 NOTE — CASE MANAGEMENT/SOCIAL WORK
Case Management Discharge Note      Final Note: Home with [facility]. Transport by private vehicle         Selected Continued Care - Admitted Since 2/10/2024       Destination    No services have been selected for the patient.                Durable Medical Equipment    No services have been selected for the patient.                Dialysis/Infusion    No services have been selected for the patient.                Home Medical Care Coordination complete.      Service Provider Selected Services Address Phone Fax Patient Preferred    [facility] ,  [facility] [address], [address], [place] [address] [phone] --              Therapy    No services have been selected for the patient.                Community Resources    No services have been selected for the patient.                Community & DME    No services have been selected for the patient.                    Selected Continued Care - Episodes Includes continued care and service providers with selected services from the active episodes listed below      High Risk Care Management Episode start date: 12/13/2022   There are no active outsourced providers for this episode.                 Selected Continued Care - Prior Encounters Includes continued care and service providers with selected services from prior encounters from 11/12/2023 to 2/13/2024      Discharged on 12/30/2023 Admission date: 12/26/2023 - Discharge disposition: Skilled Nursing Facility (DC - External)      Destination       Service Provider Selected Services Address Phone Fax Patient Preferred    [facility] [address] [address] [phone] --                          Transportation Services  Private: Car    Final Discharge Disposition Code: 06 - home with home health care

## 2024-02-13 NOTE — PROGRESS NOTES
Name: Tony Leonard ADMIT: 2/10/2024   : 1940  PCP: Harpreet Kate MD    MRN: [id number] LOS: 0 days   AGE/SEX: 83 y.o. male  ROOM: [facility]     Subjective   Subjective   Patient is seen at bedside, no new complaints.       Objective   Objective   Vital Signs  Temp:  [97.2 °F (36.2 °C)-97.7 °F (36.5 °C)] 97.7 °F (36.5 °C)  Heart Rate:  [63-88] 79  Resp:  [18] 18  BP: (127-144)/(59-79) 137/59  SpO2:  [94 %-100 %] 96 %  on  Flow (L/min):  [2] 2;   Device (Oxygen Therapy): room air  Body mass index is 25.89 kg/m².  Physical Exam      General, awake and alert.  Head and ENT, normocephalic and atraumatic.  Lungs, symmetric expansion, equal air entry bilaterally.  Heart, regular rate and rhythm.  Abdomen, soft and nontender.  Extremities, no clubbing or cyanosis.  Neuro, no focal deficits.  Skin: Warm and no rash.  Psych, normal mood and affect.  Musculoskeletal, joint examination is grossly normal.        Copied text material from yesterday's note has been reviewed for appropriate changes and remains accurate as of 24.    Results Review     I reviewed the patient's new clinical results.  Results from last 7 days   Lab Units 24  0404 24  0506 02/11/24  0327 02/10/24  1617   WBC 10*3/mm3 9.19 7.69 3.22* 6.91   HEMOGLOBIN g/dL 9.9* 9.5* 10.6* 11.7*   PLATELETS 10*3/mm3 267 255 265 300     Results from last 7 days   Lab Units 24  0404 24  0506 24  0327 02/10/24  1617   SODIUM mmol/L 139 139 140 141   POTASSIUM mmol/L 4.1 4.1 4.6 4.3   CHLORIDE mmol/L 108* 106 103 102   CO2 mmol/L 21.7* 22.5 19.0* 22.2   BUN mg/dL 24* 24* 23 25*   CREATININE mg/dL 0.98 0.96 0.90 1.02   GLUCOSE mg/dL 116* 163* 119* 104*   EGFR mL/min/1.73 75.6 77.5 83.7 72.0     Results from last 7 days   Lab Units 24  0404 24  0506 02/10/24  1617 24  2038   ALBUMIN g/dL 3.4* 3.6 4.3 4.0   BILIRUBIN mg/dL 0.2 0.3 0.6 0.4   ALK PHOS U/L 52 54 72 64   AST (SGOT) U/L 11 13 25 23   ALT (SGPT) U/L  "12 14 21 15     Results from last 7 days   Lab Units 02/13/24  0404 02/12/24  0506 02/11/24  0327 02/10/24  1617 02/06/24  2038   CALCIUM mg/dL 8.4* 8.4* 8.8 9.1 9.1   ALBUMIN g/dL 3.4* 3.6  --  4.3 4.0     Results from last 7 days   Lab Units 02/10/24  1617 02/06/24  2228 02/06/24  2038   PROCALCITONIN ng/mL  --   --  0.06   LACTATE mmol/L 1.5 1.2  --      No results found for: \"HGBA1C\", \"POCGLU\"    No radiology results for the last day    I have personally reviewed all medications:  Scheduled Medications  arformoterol, 15 mcg, Nebulization, BID - RT   And  tiotropium bromide monohydrate, 2 puff, Inhalation, Daily - RT  enoxaparin, 40 mg, Subcutaneous, Nightly  FLUoxetine, 20 mg, Oral, Daily  gabapentin, 300 mg, Oral, TID  guaiFENesin, 1,200 mg, Oral, Q12H  ipratropium-albuterol, 3 mL, Nebulization, Q6H While Awake - RT  multivitamin with minerals, 1 tablet, Oral, Daily  pantoprazole, 40 mg, Oral, Q AM  predniSONE, 40 mg, Oral, Daily With Breakfast    Infusions   Diet  Diet: Cardiac Diets; Healthy Heart (2-3 Na+); Texture: Regular Texture (IDDSI 7); Fluid Consistency: Thin (IDDSI 0)    I have personally reviewed:  [x]  Laboratory   [x]  Microbiology   [x]  Radiology   [x]  EKG/Telemetry  [x]  Cardiology/Vascular   []  Pathology    []  Records       Assessment/Plan     Active Hospital Problems    Diagnosis  POA    **Acute exacerbation of chronic obstructive pulmonary disease (COPD) [J44.1]  Yes    Paroxysmal atrial fibrillation [I48.0]  Yes    HTN (hypertension) [I10]  Yes    Chronic diastolic CHF (congestive heart failure) [I50.32]  Yes    Mild malnutrition [E44.1]  Yes    Dyslipidemia [E78.5]  Yes      Resolved Hospital Problems   No resolved problems to display.       83 y.o. male admitted with Acute exacerbation of chronic obstructive pulmonary disease (COPD).    Assessment and plan  1.  Acute exacerbation of COPD, acute hypoxemic respiratory failure, continue steroids, pulmonary consultation requested.  Follow " management recommendations, continue oxygen supplementation as needed.  Continue breathing treatments.  I was asked by pulmonary to hold his discharge for today.     2.  Paroxysmal atrial fibrillation, patient is currently rate controlled.     3. Chronic diastolic CHF, patient currently appears euvolemic.     4.  CODE STATUS is full code.  Further plans based on hospital course.      Olaf Garzon MD  [facility]  02/13/24  14:46 EST

## 2024-02-13 NOTE — TELEPHONE ENCOUNTER
[facility] staff attempted to follow warm transfer process and was unsuccessful     Caller: [facility]    Relationship to patient: Other    Best call back number: [phone]     Patient is needing:  PATIENT IS BEING RELEASED FORM [facility] TODAY AND IS NEEDING A FOLLOW UP VISIT. [facility] IS UNABLE TO FIND ANY APPOINTMENT WITHIN 7 DAY WINDOW     PLEASE CALL PATIENT TO GET SCHEDULED

## 2024-02-13 NOTE — PLAN OF CARE
Goal Outcome Evaluation:  Plan of Care Reviewed With: patient    Progress: improving  Outcome Evaluation: Mr. Leonard admitted on 2/10 for COPD exacerbation. On PO prednisone. On room air. C/o of mild lower back pain - denied pain medication. Up in chair , standby assist. Dry coughing and wheezing intermittently - improved from yesterday. Pulmonology wants to keep at least another day to allow for more aggressive pulmonary toilet. CPT, Mucomyst treatment, flutter valve ordered. No family at bedside - patient spoke with son to notify of cancelled discharge. Patient stable and needs met at this time.

## 2024-02-13 NOTE — CASE MANAGEMENT/SOCIAL WORK
Continued Stay Note  [facility]     Patient Name: Tony Leonard  MRN: [id number]  Today's Date: 2/13/2024    Admit Date: 2/10/2024    Plan: Home with [facility].  Family to transport. Pulm ordered Chest PT per percussion vest.   Discharge Plan       Row Name 02/13/24 1549       Plan    Plan Home with [facility].  Family to transport. Pulm ordered Chest PT per percussion vest.    Patient/Family in Agreement with Plan yes    Plan Comments D/C cancelled. Per pulmonary, add aggressive measures with chest PT using percussion vest, mucolytic therapy with Mucomyst and flutter valve, will reassess again tomorrow and decide if okay for discharge. Pt has had issues with mucous plugging. Has nebulizer at home and O2@ night through [facility]. Chandana Luna RN-BC                   Discharge Codes    No documentation.                 Expected Discharge Date and Time       Expected Discharge Date Expected Discharge Time    Feb 13, 2024               Chandana Luna RN

## 2024-02-13 NOTE — THERAPY EVALUATION
Patient Name: Tony Leonard  : 1940    MRN: [id number]                              Today's Date: 2024       Admit Date: 2/10/2024    Visit Dx:     ICD-10-CM ICD-9-CM   1. Acute dyspnea  R06.00 786.09   2. Acute exacerbation of chronic obstructive pulmonary disease (COPD)  J44.1 491.21   3. Chronic respiratory failure with hypoxia  J96.11 518.83     799.02     Patient Active Problem List   Diagnosis    Thrush, oral    ADD (attention deficit disorder)    Hemorrhoids    Bronchiectasis with acute lower respiratory infection    Diverticul disease small and large intestine, no perforati or abscess    Benign non-nodular prostatic hyperplasia without lower urinary tract symptoms    Gastroesophageal reflux disease    Malaise and fatigue    Environmental allergies    Hemoptysis    Dyslipidemia    Primary osteoarthritis involving multiple joints    Pneumonia of right lower lobe due to infectious organism    Abnormal EKG    COPD (chronic obstructive pulmonary disease)    Mild malnutrition    Acute on chronic respiratory failure with hypoxia    Fracture, intertrochanteric, right femur, closed, initial encounter    Chronic diastolic CHF (congestive heart failure)    Hematoma of frontal scalp    Postoperative anemia due to acute blood loss    Urinary retention    Hemorrhagic disorder due to circulating anticoagulants    History of fracture of right hip    Closed fracture of right hip with routine healing    Chronic low back pain with sciatica    Change in bowel function    Rectal bleeding    Prostate cancer    On home oxygen therapy    Acute viral bronchitis    Peripheral neuropathy    Plantar fasciitis    HTN (hypertension)    COPD exacerbation    Bilateral lower extremity edema    Microscopic hematuria    Acute midline low back pain with right-sided sciatica    Spinal stenosis, lumbar region with neurogenic claudication    Compression deformity of vertebra    Rectal bleed    Esophagitis    Angiectasia    Hiatal  hernia    Overweight (BMI 25.0-29.9)    Leukocytosis    Bilateral hip pain    Elevated troponin level not due myocardial infarction    Nocturnal hypoxia    Pneumonia of right upper lobe due to infectious organism    NSTEMI (non-ST elevated myocardial infarction)    Chronic respiratory failure with hypoxia    Paroxysmal atrial fibrillation    Acute exacerbation of chronic obstructive pulmonary disease (COPD)    Anemia    Non-compliant patient    Hypokalemia    Spondylolisthesis of lumbar region    Elevated troponin    Rhabdomyolysis    Multiple contusions    Fall     Past Medical History:   Diagnosis Date    ADHD (attention deficit hyperactivity disorder)     Bronchiectasis     CHF (congestive heart failure)     Colon polyp     COPD (chronic obstructive pulmonary disease)     Diverticulosis     Hard of hearing     On home oxygen therapy     2 LITERS    Pneumonia     Prostate cancer 04/22/2019    Spinal stenosis, lumbar region with neurogenic claudication 08/02/2022     Past Surgical History:   Procedure Laterality Date    BRONCHOSCOPY N/A 4/30/2016    Procedure: BRONCHOSCOPY with BAL of right lower lobe and left  lower lobe.  ;  Surgeon: Nando Diaz MD;  Location: [facility] ENDOSCOPY;  Service:     BRONCHOSCOPY N/A 4/28/2017    Procedure: BRONCHOSCOPY;  Surgeon: Katharina Salter MD;  Location: [facility] ENDOSCOPY;  Service:     BRONCHOSCOPY Bilateral 6/29/2017    Procedure: BRONCHOSCOPY WITH WASHINGS;  Surgeon: Katharina Salter MD;  Location: [facility] ENDOSCOPY;  Service:     BRONCHOSCOPY N/A 1/22/2018    Procedure: BRONCHOSCOPY AT BEDSIDE with BAL;  Surgeon: Katharina Salter MD;  Location: [facility] ENDOSCOPY;  Service:     BRONCHOSCOPY N/A 1/30/2018    Procedure: BRONCHOSCOPY with BAL and washing;  Surgeon: Shreyas Wild MD;  Location: [facility] ENDOSCOPY;  Service:     BRONCHOSCOPY Bilateral 4/24/2018    Procedure: BRONCHOSCOPY WITH WASHING;  Surgeon: Katharina Salter MD;  Location: [facility] ENDOSCOPY;  Service: Pulmonary     BRONCHOSCOPY N/A 12/28/2019    Procedure: BRONCHOSCOPY with bilateral washing;  Surgeon: Katharina Salter MD;  Location: [facility] ENDOSCOPY;  Service: Pulmonary    BRONCHOSCOPY Bilateral 1/4/2023    Procedure: BRONCHOSCOPY with lavage;  Surgeon: Katharina Salter MD;  Location: [facility] ENDOSCOPY;  Service: Pulmonary;  Laterality: Bilateral;  mucus plugging    CARDIAC CATHETERIZATION N/A 1/29/2023    Procedure: Left Heart Cath;  Surgeon: Johnnie Ang MD;  Location: [facility] CATH INVASIVE LOCATION;  Service: Cardiology;  Laterality: N/A;    CARDIAC CATHETERIZATION N/A 1/29/2023    Procedure: Coronary angiography;  Surgeon: Johnnie Ang MD;  Location: [facility] CATH INVASIVE LOCATION;  Service: Cardiology;  Laterality: N/A;    COLONOSCOPY  05/17/2013    eh, ih, tort, sig tics    COLONOSCOPY N/A 5/10/2019    Non-thrombosed external hemorrhoids found on perianal exam, Diverticulosis, Tortuous colon, One 5 mm polyp in the mid ascending colon, IH. Path: Tubular adenoma.     COLONOSCOPY N/A 9/24/2022    Procedure: COLONOSCOPY to cecum and TI with argon plasma coagulation.;  Surgeon: Bruce Black MD;  Location: [facility] ENDOSCOPY;  Service: Gastroenterology;  Laterality: N/A;  pre- GI bleeding  post- radiation proctitis, diverticulosis    ENDOSCOPY  03/19/2015    z line irreg, hh    ENDOSCOPY N/A 9/24/2022    Procedure: ESOPHAGOGASTRODUODENOSCOPY;  Surgeon: Bruce Black MD;  Location: [facility] ENDOSCOPY;  Service: Gastroenterology;  Laterality: N/A;  pre- GI bleeding  post- esophagitis, hiatal hernia    HIP OPEN REDUCTION Right 1/17/2018    Procedure: HIP OPEN REDUCTION INTERNAL FIXATION WITH DYNAMIC HIP SCREW;  Surgeon: Les Black MD;  Location: [facility] MAIN OR;  Service:     SPINE SURGERY      TONSILLECTOMY      TOTAL HIP ARTHROPLASTY REVISION Right 9/23/2019    Procedure: HIP  REVISION RIGHT;  Surgeon: Isauro Warner MD;  Location: [facility] MAIN OR;  Service: Orthopedics      General Information        Row Name 02/13/24 0939          Physical Therapy Time and Intention    Document Type evaluation  -CB     Mode of Treatment individual therapy;physical therapy  -CB       Row Name 02/13/24 0939          General Information    Patient Profile Reviewed yes  -CB     Prior Level of Function independent:;gait;transfer;bed mobility  rollator  -CB     Existing Precautions/Restrictions fall  -CB     Barriers to Rehab none identified  -CB       Row Name 02/13/24 0939          Living Environment    People in Home alone  -CB       Row Name 02/13/24 0939          Home Main Entrance    Number of Stairs, Main Entrance --  elevator per pt  -CB       Row Name 02/13/24 0939          Cognition    Orientation Status (Cognition) oriented x 3  -CB       Row Name 02/13/24 0939          Safety Issues, Functional Mobility    Safety Issues Affecting Function (Mobility) impulsivity;judgment;problem-solving  -CB     Impairments Affecting Function (Mobility) balance;endurance/activity tolerance;strength;postural/trunk control  -CB               User Key  (r) = Recorded By, (t) = Taken By, (c) = Cosigned By      Initials Name Provider Type    CB Amparo Wilson, PT Physical Therapist                   Mobility       Row Name 02/13/24 0941          Bed Mobility    Bed Mobility supine-sit  -CB     Supine-Sit [place] (Bed Mobility) standby assist  -CB       Row Name 02/13/24 0941          Sit-Stand Transfer    Sit-Stand [place] (Transfers) contact guard;standby assist;verbal cues  -CB     Assistive Device (Sit-Stand Transfers) walker, 4-wheeled  -CB     Comment, (Sit-Stand Transfer) x2 STS reps  -CB       Row Name 02/13/24 0941          Gait/Stairs (Locomotion)    [place] Level (Gait) contact guard;standby assist;verbal cues  -CB     Assistive Device (Gait) walker, front-wheeled  -CB     Distance in Feet (Gait) 120ft and 15ft  -CB     Deviations/Abnormal Patterns (Gait) gait speed decreased;stride length decreased  -CB     Bilateral  Gait Deviations forward flexed posture  -CB     Comment, (Gait/Stairs) very forward flexed posture which pt reports is baseline  -CB               User Key  (r) = Recorded By, (t) = Taken By, (c) = Cosigned By      Initials Name Provider Type    Amparo Gutierrez PT Physical Therapist                   Obj/Interventions       Row Name 02/13/24 0941          Range of Motion Comprehensive    General Range of Motion bilateral lower extremity ROM WFL  -CB       Row Name 02/13/24 0941          Strength Comprehensive (MMT)    General Manual Muscle Testing (MMT) Assessment lower extremity strength deficits identified  -CB       Row Name 02/13/24 0941          Balance    Balance Assessment standing static balance;standing dynamic balance  -CB     Static Standing Balance contact guard;standby assist;verbal cues  -CB     Dynamic Standing Balance standby assist;contact guard;verbal cues  -CB     Position/Device Used, Standing Balance supported;walker, 4-wheeled  -CB     Balance Interventions sitting;standing;sit to stand;supported;static;dynamic;minimal challenge  -CB       Row Name 02/13/24 0941          Sensory Assessment (Somatosensory)    Sensory Assessment (Somatosensory) sensation intact  -CB               User Key  (r) = Recorded By, (t) = Taken By, (c) = Cosigned By      Initials Name Provider Type    Amparo Gutierrez, PT Physical Therapist                   Goals/Plan       Row Name 02/13/24 0943          Bed Mobility Goal 1 (PT)    Activity/Assistive Device (Bed Mobility Goal 1, PT) bed mobility activities, all  -CB     [place] Level/Cues Needed (Bed Mobility Goal 1, PT) modified independence  -CB     Time Frame (Bed Mobility Goal 1, PT) long term goal (LTG);1 week  -CB       Row Name 02/13/24 0943          Transfer Goal 1 (PT)    Activity/Assistive Device (Transfer Goal 1, PT) sit-to-stand/stand-to-sit;bed-to-chair/chair-to-bed;walker, rolling  -CB     [place] Level/Cues Needed (Transfer Goal 1, PT)  standby assist  -CB     Time Frame (Transfer Goal 1, PT) long term goal (LTG);1 week  -CB       Row Name 02/13/24 0943          Gait Training Goal 1 (PT)    Activity/Assistive Device (Gait Training Goal 1, PT) gait (walking locomotion);assistive device use;improve balance and speed;increase endurance/gait distance;decrease fall risk;diminish gait deviation;walker, rolling  -CB     [place] Level (Gait Training Goal 1, PT) modified independence  -CB     Distance (Gait Training Goal 1, PT) 150ft  -CB     Time Frame (Gait Training Goal 1, PT) long term goal (LTG);1 week  -CB       Row Name 02/13/24 0943          Therapy Assessment/Plan (PT)    Planned Therapy Interventions (PT) balance training;bed mobility training;gait training;home exercise program;patient/family education;transfer training;strengthening  -CB               User Key  (r) = Recorded By, (t) = Taken By, (c) = Cosigned By      Initials Name Provider Type    Amparo Gutierrez, PT Physical Therapist                   Clinical Impression       Row Name 02/13/24 0942          Pain    Pretreatment Pain Rating 0/10 - no pain  -CB       Row Name 02/13/24 0942          Plan of Care Review    Plan of Care Reviewed With patient  -CB     Outcome Evaluation Patient is an 83 y.o. male admitted to [facility] for acute exacerbation of COPD on 2/10/2024. PMHx includes COPD, afib. Patient is ind at baseline with use of rollator and lives alone in ILF. Today, patient performed bed mobility with SBA, required SBA for transfers, and ambulated 120ft using rollator requiring CGA-SBA. Pt impulsive at times and very forward flexed which pt reports is baseline. Strength, postural control, safety awareness deficits noted. Patient may benefit from skilled PT services to address functional deficits and improve level of independence prior to discharge. Anticipate return to ILF with assist from family and HHPT upon DC.  -CB       Row Name 02/13/24 0942          Therapy Assessment/Plan  (PT)    Rehab Potential (PT) good, to achieve stated therapy goals  -CB     Criteria for Skilled Interventions Met (PT) yes  -CB     Therapy Frequency (PT) 5 times/wk  -CB       Row Name 02/13/24 0942          Positioning and Restraints    Pre-Treatment Position in bed  -CB     Post Treatment Position bathroom  -CB     Bathroom notified nsg;call light within reach;encouraged to call for assist;sitting  RN and NA notified  -CB               User Key  (r) = Recorded By, (t) = Taken By, (c) = Cosigned By      Initials Name Provider Type    Amparo Gutierrez, PT Physical Therapist                   Outcome Measures       Row Name 02/13/24 0945          How much help from another person do you currently need...    Turning from your back to your side while in flat bed without using bedrails? 4  -CB     Moving from lying on back to sitting on the side of a flat bed without bedrails? 3  -CB     Moving to and from a bed to a chair (including a wheelchair)? 3  -CB     Standing up from a chair using your arms (e.g., wheelchair, bedside chair)? 3  -CB     Climbing 3-5 steps with a railing? 3  -CB     To walk in hospital room? 3  -CB     AM-PAC 6 Clicks Score (PT) 19  -CB     Highest Level of Mobility Goal 6 --> Walk 10 steps or more  -CB       Row Name 02/13/24 0945          Functional Assessment    Outcome Measure Options AM-PAC 6 Clicks Basic Mobility (PT)  -CB               User Key  (r) = Recorded By, (t) = Taken By, (c) = Cosigned By      Initials Name Provider Type    Amparo Gutierrez PT Physical Therapist                                 Physical Therapy Education       Title: PT OT SLP Therapies (In Progress)       Topic: Physical Therapy (In Progress)       Point: Mobility training (Done)       Learning Progress Summary             Patient Acceptance, E,TB, VU,NR by  at 2/13/2024 0946                         Point: Home exercise program (Not Started)       Learner Progress:  Not documented in this visit.               Point: Body mechanics (Done)       Learning Progress Summary             Patient Acceptance, E,TB, VU,NR by CB at 2/13/2024 0946                         Point: Precautions (Done)       Learning Progress Summary             Patient Acceptance, E,TB, VU,NR by CB at 2/13/2024 0946                                         User Key       Initials Effective Dates Name Provider Type Discipline    CB 10/22/21 -  Amparo Wilson, PT Physical Therapist PT                  PT Recommendation and Plan  Planned Therapy Interventions (PT): balance training, bed mobility training, gait training, home exercise program, patient/family education, transfer training, strengthening  Plan of Care Reviewed With: patient  Outcome Evaluation: Patient is an 83 y.o. male admitted to [facility] for acute exacerbation of COPD on 2/10/2024. PMHx includes COPD, afib. Patient is ind at baseline with use of rollator and lives alone in ILF. Today, patient performed bed mobility with SBA, required SBA for transfers, and ambulated 120ft using rollator requiring CGA-SBA. Pt impulsive at times and very forward flexed which pt reports is baseline. Strength, postural control, safety awareness deficits noted. Patient may benefit from skilled PT services to address functional deficits and improve level of independence prior to discharge. Anticipate return to ILF with assist from family and HHPT upon DC.     Time Calculation:         PT Charges       Row Name 02/13/24 0949             Time Calculation    Start Time 0835  -CB      Stop Time 0852  -CB      Time Calculation (min) 17 min  -CB      PT Received On 02/13/24  -CB      PT - Next Appointment 02/14/24  -CB      PT Goal Re-Cert Due Date 02/20/24  -CB         Time Calculation- PT    Total Timed Code Minutes- PT 8 minute(s)  -CB         Timed Charges    57970 - PT Therapeutic Activity Minutes 8  -CB         Total Minutes    Timed Charges Total Minutes 8  -CB       Total Minutes 8  -CB                User Key   (r) = Recorded By, (t) = Taken By, (c) = Cosigned By      Initials Name Provider Type    CB Amparo Wilson, PT Physical Therapist                  Therapy Charges for Today       Code Description Service Date Service Provider Modifiers Qty    33307988325 HC PT THERAPEUTIC ACT EA 15 MIN 2/13/2024 Amparo Wilson, PT GP 1    28802521362 HC PT EVAL MOD COMPLEXITY 3 2/13/2024 Amparo Wilson, PT GP 1            PT G-Codes  Outcome Measure Options: AM-PAC 6 Clicks Basic Mobility (PT)  AM-PAC 6 Clicks Score (PT): 19  PT Discharge Summary  Anticipated Discharge Disposition (PT): home with home health, home with assist    Amparo Wilson, PT  2/13/2024

## 2024-02-13 NOTE — PROGRESS NOTES
"  PROGRESS NOTE  Patient Name: Tony Leonard  Age/Sex: 83 y.o. male  : 1940  MRN: [id number]    Date of Admission: 2/10/2024  Date of Encounter Visit: 24   LOS: 0 days   Patient Care Team:  Harpreet Kate MD as PCP - General (Internal Medicine)  Katharina Salter MD as Consulting Physician (Pulmonary Disease)  Anum Bhatt MD as Consulting Physician (Pain Medicine)  Azeb Cook, ANUSHA as Ambulatory  ([facility])    Chief Complaint: Asthma exacerbation    Hospital course: Patient is having asthma exacerbation, he is already on the steroids but he still sounds pretty congested  He does have history of recurrent mucous plugging requiring previous bronchoscopies and need to be more aggressive with his pulmonary hygiene measures  He is already on the proper initial therapy we will add however more measures to help with expectoration         REVIEW OF SYSTEMS:   CONSTITUTIONAL: no fever or chills  CARDIOVASCULAR: No chest pain, chest pressure or chest discomfort. No palpitations.  Improving lower extremities edema   RESPIRATORY: Positive for cough, thick secretion with difficulty expectorating.   GASTROINTESTINAL: No anorexia, nausea, vomiting or diarrhea. No abdominal pain or blood.   HEMATOLOGIC: No bleeding or bruising.     Ventilator/Non-Invasive Ventilation Settings (From admission, onward)      None              Vital Signs  Temp:  [97.2 °F (36.2 °C)-97.7 °F (36.5 °C)] 97.7 °F (36.5 °C)  Heart Rate:  [63-88] 79  Resp:  [18] 18  BP: (127-144)/(59-79) 137/59  SpO2:  [94 %-100 %] 96 %  on  Flow (L/min):  [2] 2 Device (Oxygen Therapy): room air    Intake/Output Summary (Last 24 hours) at 2024 1358  Last data filed at 2024 1330  Gross per 24 hour   Intake 1210 ml   Output 750 ml   Net 460 ml     Flowsheet Rows      Flowsheet Row First Filed Value   Admission Height 180.3 cm (71\") Documented at 02/10/2024 1543   Admission Weight 85.7 kg (189 lb) Documented at 02/10/2024 " 1543          Body mass index is 25.89 kg/m².      02/11/24  0645 02/12/24  0533 02/13/24  0500   Weight: 82.1 kg (181 lb) 82.6 kg (182 lb 1.6 oz) 84.2 kg (185 lb 10 oz)       Physical Exam:  GEN:  No acute distress, alert, cooperative, well developed   EYES:   Sclerae clear. No icterus. PERRL. Normal EOM  ENT:   External ears/nose normal, no oral lesions, no thrush, mucous membranes moist  NECK:  Supple, midline trachea, no JVD  LUNGS: Normal chest on inspection, positive coarse rhonchi, more so on the right upper lobe in the left lower lobe Respirations regular, even and unlabored.   CV:  Regular rhythm and rate. Normal S1/S2. No murmurs, gallops, or rubs noted.  ABD:  Soft, nontender and nondistended. Normal bowel sounds. No guarding  EXT:  Moves all extremities well. No cyanosis. No redness.  2+ pitting edema but seems improving compared to earlier.   Skin: Dry, intact, no bleeding    Results Review:    Results From Last 14 Days   Lab Units 02/10/24  1617 02/06/24  2228   LACTATE mmol/L 1.5 1.2     Results from last 7 days   Lab Units 02/13/24  0404 02/12/24  0506 02/11/24  0327 02/10/24  1617 02/06/24  2038   SODIUM mmol/L 139 139 140 141 138   POTASSIUM mmol/L 4.1 4.1 4.6 4.3 4.5   CHLORIDE mmol/L 108* 106 103 102 104   CO2 mmol/L 21.7* 22.5 19.0* 22.2 22.0   BUN mg/dL 24* 24* 23 25* 21   CREATININE mg/dL 0.98 0.96 0.90 1.02 1.02   CALCIUM mg/dL 8.4* 8.4* 8.8 9.1 9.1   AST (SGOT) U/L 11 13  --  25 23   ALT (SGPT) U/L 12 14  --  21 15   ANION GAP mmol/L 9.3 10.5 18.0* 16.8* 12.0   ALBUMIN g/dL 3.4* 3.6  --  4.3 4.0     Results from last 7 days   Lab Units 02/10/24  1846 02/10/24  1617 02/06/24 2038   HSTROP T ng/L 31* 42* 31*         Results from last 7 days   Lab Units 02/10/24  1617 02/06/24 2038   PROBNP pg/mL 115.0 597.0     Results from last 7 days   Lab Units 02/13/24  0404 02/12/24  0506 02/11/24  0327 02/10/24  1617 02/06/24 2038   WBC 10*3/mm3 9.19 7.69 3.22* 6.91 7.04   HEMOGLOBIN g/dL 9.9* 9.5*  "10.6* 11.7* 10.3*   HEMATOCRIT % 30.9* 29.6* 33.2* 37.1* 32.4*   PLATELETS 10*3/mm3 267 255 265 300 265   MCV fL 83.5 83.4 82.2 84.1 84.8   NEUTROPHIL % % 86.9* 90.5*  --  57.7 55.8   LYMPHOCYTE % % 8.7* 6.8*  --  24.6 22.0   MONOCYTES % % 3.8* 2.3*  --  9.3 13.1*   EOSINOPHIL % % 0.0* 0.0*  --  7.1* 8.1*   BASOPHIL % % 0.1 0.0  --  1.0 0.7   IMM GRAN % % 0.5 0.4  --  0.3 0.3                   Invalid input(s): \"LDLCALC\"          No results found for: \"POCGLU\"  Results from last 7 days   Lab Units 02/10/24  1617 02/06/24  2228 02/06/24  2038   PROCALCITONIN ng/mL  --   --  0.06   LACTATE mmol/L 1.5 1.2  --      Results from last 7 days   Lab Units 02/10/24  1618   BLOODCX  No growth at 2 days  No growth at 2 days         Results from last 7 days   Lab Units 02/06/24 2034   COVID19  Not Detected   ADENOVIRUS DETECTION BY PCR  Not Detected   CORONAVIRUS 229E  Not Detected   CORONAVIRUS HKU1  Not Detected   CORONAVIRUS NL63  Not Detected   CORONAVIRUS OC43  Not Detected   HUMAN METAPNEUMOVIRUS  Not Detected   HUMAN RHINOVIRUS/ENTEROVIRUS  Not Detected   INFLUENZA B PCR  Not Detected   PARAINFLUENZA 1  Not Detected   PARAINFLUENZA VIRUS 2  Not Detected   PARAINFLUENZA VIRUS 3  Not Detected   PARAINFLUENZA VIRUS 4  Not Detected   BORDETELLA PERTUSSIS PCR  Not Detected   BORDETELLA PARAPERTUSSIS PCR  Not Detected   CHLAMYDOPHILA PNEUMONIAE PCR  Not Detected   MYCOPLAMA PNEUMO PCR  Not Detected   RSV, PCR  Not Detected               Imaging:   Imaging Results (All)                 I reviewed the patient's new clinical results.  I personally viewed and interpreted the patient's imaging results:        Medication Review:   arformoterol, 15 mcg, Nebulization, BID - RT   And  tiotropium bromide monohydrate, 2 puff, Inhalation, Daily - RT  enoxaparin, 40 mg, Subcutaneous, Nightly  FLUoxetine, 20 mg, Oral, Daily  gabapentin, 300 mg, Oral, TID  guaiFENesin, 1,200 mg, Oral, Q12H  ipratropium-albuterol, 3 mL, Nebulization, Q6H " While Awake - RT  multivitamin with minerals, 1 tablet, Oral, Daily  pantoprazole, 40 mg, Oral, Q AM  predniSONE, 40 mg, Oral, Daily With Breakfast             ASSESSMENT:   COPD with exacerbation  History of recurrent mucous plugging  Nocturnal hypoxemia  ADHD  Gastroesophageal flux disease  Chronic heart failure with preserved EF    PLAN:  At this point no infection is suspected, no antibiotic necessary however patient needs to be on more aggressive pulmonary hygiene measures  Discussed with the primary team, with the nursing staff and with the patient  We will hold on the discharge for 1 more day meanwhile we will add aggressive measures with chest PT using percussion vest, mucolytic therapy with Mucomyst and flutter valve, will reassess again tomorrow and decide if okay for discharge or if an extra day extension is necessary.  As discussed before patient has several previous episodes with progressive mucous plugging requiring bronchoscopy for  relief of the symptoms and we are hoping we can prevent that from progressing to that stage  Discussed with patient, primary team and nursing staff  Labs/Notes/films were independently reviewed and pertinent results are summarized above  The copied texts in this note were reviewed and they are accurate as of 02/13/24    Disposition: Home    Katharina Salter MD  02/13/24  13:58 EST           Dictated utilizing Dragon dictation

## 2024-02-13 NOTE — DISCHARGE SUMMARY
[facility]    Date of Discharge:  2/13/2024    PCP: Harpreet Kate MD    Discharge Diagnosis:   Active Hospital Problems    Diagnosis  POA    **Acute exacerbation of chronic obstructive pulmonary disease (COPD) [J44.1]  Yes    Paroxysmal atrial fibrillation [I48.0]  Yes    HTN (hypertension) [I10]  Yes    Chronic diastolic CHF (congestive heart failure) [I50.32]  Yes    Mild malnutrition [E44.1]  Yes    Dyslipidemia [E78.5]  Yes      Resolved Hospital Problems   No resolved problems to display.          Consults       Date and Time Order Name Status Description    2/10/2024  7:07 PM Inpatient Pulmonology Consult      2/10/2024  6:15 PM [facility] (on-call MD unless specified) Details            Hospital Course  83 y.o. male with past medical history significant for atrial fibrillation, congestive heart failure, presented to the hospital with acute hypoxemic respiratory failure, had been diagnosed to have acute exacerbation of COPD.  Patient was initially started on IV Solu-Medrol, breathing treatments and supplemental oxygen.  Patient has dramatically improved.  Patient has been transitioned to oral steroids, he is tolerating the treatment well.  He is on room air today, he is deemed stable to be discharged, he will follow-up with his PCP on outpatient basis.  We will prescribe him oral steroids at the time of discharge.  He does not necessarily need antibiotics.  Total time spent on discharge day management is more than 30 minutes.        Temp:  [97.2 °F (36.2 °C)-97.7 °F (36.5 °C)] 97.2 °F (36.2 °C)  Heart Rate:  [63-91] 63  Resp:  [18-20] 18  BP: (127-144)/(64-79) 144/79  Body mass index is 25.89 kg/m².    Physical Exam  General, awake and alert.  Head and ENT, normocephalic and atraumatic.  Lungs, symmetric expansion, equal air entry bilaterally.  Heart, regular rate and rhythm.  Abdomen, soft and nontender.  Extremities, no clubbing or cyanosis.  Neuro, no  focal deficits.  Skin: Warm and no rash.  Psych, normal mood and affect.  Musculoskeletal, joint examination is grossly normal.      Disposition: Home or Self Care       Discharge Medications        New Medications        Instructions Start Date   predniSONE 20 MG tablet  Commonly known as: DELTASONE   20 mg, Oral, Daily With Breakfast   Start Date: February 14, 2024            Continue These Medications        Instructions Start Date   acetaminophen 325 MG tablet  Commonly known as: TYLENOL   650 mg, Oral, Every 4 Hours PRN      albuterol (2.5 MG/3ML) 0.083% nebulizer solution  Commonly known as: PROVENTIL   2.5 mg, Nebulization, Every 6 Hours PRN      calcium carbonate 500 MG chewable tablet  Commonly known as: TUMS   1 tablet, Oral, As Needed      dicyclomine 10 MG capsule  Commonly known as: BENTYL   10 mg, Oral, 3 Times Daily PRN      FLUoxetine 20 MG capsule  Commonly known as: PROzac   TAKE 1 CAPSULE DAILY      gabapentin 300 MG capsule  Commonly known as: NEURONTIN   300 mg, Oral, 3 Times Daily      guaiFENesin 600 MG 12 hr tablet  Commonly known as: MUCINEX   600 mg, Oral, Every 12 Hours Scheduled      guaiFENesin-dextromethorphan 100-10 MG/5ML syrup  Commonly known as: ROBITUSSIN DM   5 mL, Oral, Every 4 Hours PRN      multivitamin with minerals tablet tablet   1 tablet, Oral, Daily      ondansetron ODT 4 MG disintegrating tablet  Commonly known as: ZOFRAN-ODT   4 mg, Translingual, Every 6 Hours PRN      pantoprazole 40 MG EC tablet  Commonly known as: PROTONIX   TAKE 1 TABLET DAILY ON EMPTY STOMACH HALF AN HOUR BEFORE MEAL      tiZANidine 2 MG tablet  Commonly known as: ZANAFLEX   2 mg, Oral, Nightly PRN      Umeclidinium-Vilanterol 62.5-25 MCG/ACT aerosol powder  inhaler  Commonly known as: ANORO ELLIPTA   1 puff, Inhalation, Daily - RT                Additional Instructions for the Follow-ups that You Need to Schedule       Ambulatory Referral to Case Management   As directed      Discharge Follow-up  with PCP   As directed       Currently Documented PCP:    Harpreet Kate MD    PCP Phone Number:    [phone]     Follow Up Details: Follow up with PCP in 7 days.               Contact information for follow-up providers       Harpreet Kate MD .    Specialty: Internal Medicine  Why: Follow up with PCP in 7 days.  Contact information:  [address] [facility] [address]  [phone]               Azeb Cook, RN .    Specialty: Population Health                     Contact information for after-discharge care       Home Medical Care       [facility] .    Services: Home Nursing, Home Rehabilitation  Contact information:  [address] [facility] [address]  [facility] [id number] [phone]                                  Future Appointments   Date Time Provider Department Center   2/22/2024 12:30 PM Johnnie Ang MD [facility]   4/18/2024 10:00 AM [facility]   4/23/2024  2:30 PM Harpreet Kate MD [facility]     Pending Labs       Order Current Status    Blood Culture - Blood, Arm, Left Preliminary result    Blood Culture - Blood, Arm, Right Preliminary result           Olaf Garzon MD  [facility]  02/13/24    Discharge time spent greater than 30 minutes.

## 2024-02-14 ENCOUNTER — READMISSION MANAGEMENT (OUTPATIENT)
Dept: CALL CENTER | Facility: HOSPITAL | Age: 84
End: 2024-02-14
Payer: MEDICARE

## 2024-02-14 VITALS
SYSTOLIC BLOOD PRESSURE: 118 MMHG | HEART RATE: 79 BPM | WEIGHT: 188.71 LBS | TEMPERATURE: 97.3 F | OXYGEN SATURATION: 97 % | BODY MASS INDEX: 26.42 KG/M2 | DIASTOLIC BLOOD PRESSURE: 56 MMHG | HEIGHT: 71 IN | RESPIRATION RATE: 18 BRPM

## 2024-02-14 LAB
ALBUMIN SERPL-MCNC: 3.3 G/DL (ref 3.5–5.2)
ALBUMIN/GLOB SERPL: 1.4 G/DL
ALP SERPL-CCNC: 49 U/L (ref 39–117)
ALT SERPL W P-5'-P-CCNC: 13 U/L (ref 1–41)
ANION GAP SERPL CALCULATED.3IONS-SCNC: 6.2 MMOL/L (ref 5–15)
AST SERPL-CCNC: 8 U/L (ref 1–40)
BASOPHILS # BLD AUTO: 0 10*3/MM3 (ref 0–0.2)
BASOPHILS NFR BLD AUTO: 0 % (ref 0–1.5)
BILIRUB SERPL-MCNC: 0.2 MG/DL (ref 0–1.2)
BUN SERPL-MCNC: 30 MG/DL (ref 8–23)
BUN/CREAT SERPL: 30.6 (ref 7–25)
CALCIUM SPEC-SCNC: 8.5 MG/DL (ref 8.6–10.5)
CHLORIDE SERPL-SCNC: 108 MMOL/L (ref 98–107)
CO2 SERPL-SCNC: 23.8 MMOL/L (ref 22–29)
CREAT SERPL-MCNC: 0.98 MG/DL (ref 0.76–1.27)
DEPRECATED RDW RBC AUTO: 42.8 FL (ref 37–54)
EGFRCR SERPLBLD CKD-EPI 2021: 75.6 ML/MIN/1.73
EOSINOPHIL # BLD AUTO: 0 10*3/MM3 (ref 0–0.4)
EOSINOPHIL NFR BLD AUTO: 0 % (ref 0.3–6.2)
ERYTHROCYTE [DISTWIDTH] IN BLOOD BY AUTOMATED COUNT: 14.4 % (ref 12.3–15.4)
GLOBULIN UR ELPH-MCNC: 2.4 GM/DL
GLUCOSE SERPL-MCNC: 105 MG/DL (ref 65–99)
HCT VFR BLD AUTO: 31.7 % (ref 37.5–51)
HGB BLD-MCNC: 9.8 G/DL (ref 13–17.7)
IMM GRANULOCYTES # BLD AUTO: 0.06 10*3/MM3 (ref 0–0.05)
IMM GRANULOCYTES NFR BLD AUTO: 0.7 % (ref 0–0.5)
LYMPHOCYTES # BLD AUTO: 1.45 10*3/MM3 (ref 0.7–3.1)
LYMPHOCYTES NFR BLD AUTO: 16 % (ref 19.6–45.3)
MCH RBC QN AUTO: 25.6 PG (ref 26.6–33)
MCHC RBC AUTO-ENTMCNC: 30.9 G/DL (ref 31.5–35.7)
MCV RBC AUTO: 82.8 FL (ref 79–97)
MONOCYTES # BLD AUTO: 0.74 10*3/MM3 (ref 0.1–0.9)
MONOCYTES NFR BLD AUTO: 8.2 % (ref 5–12)
NEUTROPHILS NFR BLD AUTO: 6.82 10*3/MM3 (ref 1.7–7)
NEUTROPHILS NFR BLD AUTO: 75.1 % (ref 42.7–76)
NRBC BLD AUTO-RTO: 0 /100 WBC (ref 0–0.2)
PLATELET # BLD AUTO: 282 10*3/MM3 (ref 140–450)
PMV BLD AUTO: 9.5 FL (ref 6–12)
POTASSIUM SERPL-SCNC: 4.2 MMOL/L (ref 3.5–5.2)
PROT SERPL-MCNC: 5.7 G/DL (ref 6–8.5)
RBC # BLD AUTO: 3.83 10*6/MM3 (ref 4.14–5.8)
SODIUM SERPL-SCNC: 138 MMOL/L (ref 136–145)
WBC NRBC COR # BLD AUTO: 9.07 10*3/MM3 (ref 3.4–10.8)

## 2024-02-14 PROCEDURE — 94799 UNLISTED PULMONARY SVC/PX: CPT

## 2024-02-14 PROCEDURE — 94664 DEMO&/EVAL PT USE INHALER: CPT

## 2024-02-14 PROCEDURE — 94761 N-INVAS EAR/PLS OXIMETRY MLT: CPT

## 2024-02-14 PROCEDURE — 63710000001 PREDNISONE PER 1 MG: Performed by: INTERNAL MEDICINE

## 2024-02-14 PROCEDURE — 94669 MECHANICAL CHEST WALL OSCILL: CPT

## 2024-02-14 PROCEDURE — 94760 N-INVAS EAR/PLS OXIMETRY 1: CPT

## 2024-02-14 PROCEDURE — 80053 COMPREHEN METABOLIC PANEL: CPT | Performed by: INTERNAL MEDICINE

## 2024-02-14 PROCEDURE — 85025 COMPLETE CBC W/AUTO DIFF WBC: CPT | Performed by: INTERNAL MEDICINE

## 2024-02-14 RX ADMIN — GABAPENTIN 300 MG: 300 CAPSULE ORAL at 08:35

## 2024-02-14 RX ADMIN — FLUOXETINE HYDROCHLORIDE 20 MG: 20 CAPSULE ORAL at 08:35

## 2024-02-14 RX ADMIN — ACETYLCYSTEINE 3 ML: 200 SOLUTION ORAL; RESPIRATORY (INHALATION) at 15:12

## 2024-02-14 RX ADMIN — IPRATROPIUM BROMIDE AND ALBUTEROL SULFATE 3 ML: 2.5; .5 SOLUTION RESPIRATORY (INHALATION) at 08:12

## 2024-02-14 RX ADMIN — ACETYLCYSTEINE 3 ML: 200 SOLUTION ORAL; RESPIRATORY (INHALATION) at 12:04

## 2024-02-14 RX ADMIN — PREDNISONE 40 MG: 20 TABLET ORAL at 08:35

## 2024-02-14 RX ADMIN — TIOTROPIUM BROMIDE INHALATION SPRAY 2 PUFF: 3.12 SPRAY, METERED RESPIRATORY (INHALATION) at 12:15

## 2024-02-14 RX ADMIN — Medication 1 TABLET: at 08:35

## 2024-02-14 RX ADMIN — PANTOPRAZOLE SODIUM 40 MG: 40 TABLET, DELAYED RELEASE ORAL at 06:06

## 2024-02-14 RX ADMIN — ACETYLCYSTEINE 3 ML: 200 SOLUTION ORAL; RESPIRATORY (INHALATION) at 08:12

## 2024-02-14 RX ADMIN — ARFORMOTEROL TARTRATE 15 MCG: 15 SOLUTION RESPIRATORY (INHALATION) at 08:13

## 2024-02-14 RX ADMIN — GUAIFENESIN 1200 MG: 600 TABLET, EXTENDED RELEASE ORAL at 08:35

## 2024-02-14 RX ADMIN — IPRATROPIUM BROMIDE AND ALBUTEROL SULFATE 3 ML: 2.5; .5 SOLUTION RESPIRATORY (INHALATION) at 12:02

## 2024-02-14 RX ADMIN — ALBUTEROL SULFATE 2.5 MG: 2.5 SOLUTION RESPIRATORY (INHALATION) at 15:08

## 2024-02-14 NOTE — CASE MANAGEMENT/SOCIAL WORK
Case Management Discharge Note      Final Note: Home with [facility]. Pt to continue 7% hypertonic saline the DuoNebs, percussion vest, and the flutter valve (He has all of this DME at home). Family to transport         Selected Continued Care - Admitted Since 2/10/2024       Destination    No services have been selected for the patient.                Durable Medical Equipment    No services have been selected for the patient.                Dialysis/Infusion    No services have been selected for the patient.                Home Medical Care Coordination complete.      Service Provider Selected Services Address Phone Fax Patient Preferred    [facility] ,  Home Rehabilitation [address], [address], [facility] [address] [phone] --              Therapy    No services have been selected for the patient.                Community Resources    No services have been selected for the patient.                Community & DME    No services have been selected for the patient.                    Selected Continued Care - Episodes Includes continued care and service providers with selected services from the active episodes listed below      High Risk Care Management Episode start date: 12/13/2022   There are no active outsourced providers for this episode.                 Selected Continued Care - Prior Encounters Includes continued care and service providers with selected services from prior encounters from 11/12/2023 to 2/14/2024      Discharged on 12/30/2023 Admission date: 12/26/2023 - Discharge disposition: Skilled Nursing Facility (DC - External)      Destination       Service Provider Selected Services Address Phone Fax Patient Preferred    [facility] [address] [address] [phone] --                          Transportation Services  Private: Car    Final Discharge Disposition Code: 06 - home  with home health care

## 2024-02-14 NOTE — DISCHARGE SUMMARY
Patient Name: Tony Leonard  : 1940  MRN: [id number]    Date of Admission: 2/10/2024  Date of Discharge:  2024  Primary Care Physician: Harpreet Kate MD      Chief Complaint:   Shortness of Breath and Cough      Discharge Diagnoses     Active Hospital Problems    Diagnosis  POA    **Acute exacerbation of chronic obstructive pulmonary disease (COPD) [J44.1]  Yes    Paroxysmal atrial fibrillation [I48.0]  Yes    HTN (hypertension) [I10]  Yes    Chronic diastolic CHF (congestive heart failure) [I50.32]  Yes    Mild malnutrition [E44.1]  Yes    Dyslipidemia [E78.5]  Yes      Resolved Hospital Problems   No resolved problems to display.        Hospital Course     Mr. Leonard is a 83 y.o. male with a history of COPD, chronic nocturnal hypoxemia, bronchiectasis, CHF, prostate cancer who presented to [facility] initially complaining of shortness of breath.  Please see the admitting history and physical for further details.  He was found to have acute exacerbation of COPD and was admitted to the hospital for further evaluation and treatment.  Pulmonology consulted on admission, the patient was treated with steroids, scheduled nebulizers, and aggressive pulmonary hygiene with percussion vest, hypertonic saline, flutter valve.  The patient clinically improved and he will discharge home to complete a 7-day course of steroids, he will also need to follow-up with his pulmonologist the next 1 to 3 weeks.  The patient should also follow-up with PCP in a week for posthospital follow-up visit.  Day of Discharge     Subjective:  Patient is sitting up in his recliner and is eager to go home today.  He has seen his pulmonologist this morning and is able to tell me what they talked about/the plan.    Physical Exam:  Temp:  [97.6 °F (36.4 °C)-98.3 °F (36.8 °C)] 98.1 °F (36.7 °C)  Heart Rate:  [79-97] 83  Resp:  [18-20] 20  BP: (122-153)/(59-75) 153/75  Body mass index is 26.32 kg/m².  Physical  Exam  Constitutional:       General: He is not in acute distress.     Appearance: He is ill-appearing. He is not toxic-appearing.   Cardiovascular:      Rate and Rhythm: Normal rate and regular rhythm.   Pulmonary:      Effort: Pulmonary effort is normal. No respiratory distress.      Breath sounds: No wheezing.   Abdominal:      General: Abdomen is flat.      Palpations: Abdomen is soft.      Tenderness: There is no abdominal tenderness.   Musculoskeletal:         General: No tenderness.      Right lower leg: Edema present.      Left lower leg: Edema present.   Skin:     General: Skin is warm and dry.   Neurological:      General: No focal deficit present.      Mental Status: He is alert and oriented to person, place, and time. Mental status is at baseline.      Motor: No weakness.         Consultants     Consult Orders (all) (From admission, onward)       Start     Ordered    02/10/24 2234  Inpatient Case Management  Consult  Once        Provider:  (Not yet assigned)    02/10/24 2234    02/10/24 1908  Inpatient Pulmonology Consult  Once        Specialty:  Pulmonary Disease  Provider:  Scott Bob MD    02/10/24 1907    02/10/24 1816  [facility] (on-call MD unless specified) Details  Once        Specialty:  Hospitalist  Provider:  (Not yet assigned)    02/10/24 1815                  Procedures     Imaging Results (All)       Procedure Component Value Units Date/Time    XR Chest 1 View [[id number]] Collected: 02/10/24 1730     Updated: 02/10/24 1734    Narrative:      XR CHEST 1 VW-     Clinical: COPD, cough     COMPARISON examination dated 2/6/2024     FINDINGS: There has been some clearing of the vague right lung  infiltrate within the interim. The cardiomediastinal silhouette is  stable. No vascular congestion or new airspace disease has developed. No  gross pleural effusion seen. The remainder is unremarkable.     This report was finalized on 2/10/2024 5:31 PM by Dr. Richard Solitario M.D  on  "Workstation: [id number]               Pertinent Labs     Results from last 7 days   Lab Units 02/14/24  0454 02/13/24  0404 02/12/24  0506 02/11/24  0327   WBC 10*3/mm3 9.07 9.19 7.69 3.22*   HEMOGLOBIN g/dL 9.8* 9.9* 9.5* 10.6*   PLATELETS 10*3/mm3 282 267 255 265     Results from last 7 days   Lab Units 02/14/24  0454 02/13/24  0404 02/12/24  0506 02/11/24  0327   SODIUM mmol/L 138 139 139 140   POTASSIUM mmol/L 4.2 4.1 4.1 4.6   CHLORIDE mmol/L 108* 108* 106 103   CO2 mmol/L 23.8 21.7* 22.5 19.0*   BUN mg/dL 30* 24* 24* 23   CREATININE mg/dL 0.98 0.98 0.96 0.90   GLUCOSE mg/dL 105* 116* 163* 119*   Estimated Creatinine Clearance: 66.7 mL/min (by C-G formula based on SCr of 0.98 mg/dL).  Results from last 7 days   Lab Units 02/14/24  0454 02/13/24  0404 02/12/24  0506 02/10/24  1617   ALBUMIN g/dL 3.3* 3.4* 3.6 4.3   BILIRUBIN mg/dL 0.2 0.2 0.3 0.6   ALK PHOS U/L 49 52 54 72   AST (SGOT) U/L 8 11 13 25   ALT (SGPT) U/L 13 12 14 21     Results from last 7 days   Lab Units 02/14/24  0454 02/13/24  0404 02/12/24  0506 02/11/24  0327 02/10/24  1617   CALCIUM mg/dL 8.5* 8.4* 8.4* 8.8 9.1   ALBUMIN g/dL 3.3* 3.4* 3.6  --  4.3       Results from last 7 days   Lab Units 02/10/24  1846 02/10/24  1617   HSTROP T ng/L 31* 42*   PROBNP pg/mL  --  115.0           Invalid input(s): \"LDLCALC\"  Results from last 7 days   Lab Units 02/10/24  1618   BLOODCX  No growth at 3 days  No growth at 3 days       Test Results Pending at Discharge     Pending Labs       Order Current Status    Blood Culture - Blood, Arm, Left Preliminary result    Blood Culture - Blood, Arm, Right Preliminary result            Discharge Details        Discharge Medications        New Medications        Instructions Start Date   predniSONE 20 MG tablet  Commonly known as: DELTASONE   20 mg, Oral, Daily With Breakfast             Continue These Medications        Instructions Start Date   acetaminophen 325 MG tablet  Commonly known as: TYLENOL   650 mg, Oral, " Every 4 Hours PRN      albuterol (2.5 MG/3ML) 0.083% nebulizer solution  Commonly known as: PROVENTIL   2.5 mg, Nebulization, Every 6 Hours PRN      calcium carbonate 500 MG chewable tablet  Commonly known as: TUMS   1 tablet, Oral, As Needed      dicyclomine 10 MG capsule  Commonly known as: BENTYL   10 mg, Oral, 3 Times Daily PRN      FLUoxetine 20 MG capsule  Commonly known as: PROzac   TAKE 1 CAPSULE DAILY      gabapentin 300 MG capsule  Commonly known as: NEURONTIN   300 mg, Oral, 3 Times Daily      guaiFENesin 600 MG 12 hr tablet  Commonly known as: MUCINEX   600 mg, Oral, Every 12 Hours Scheduled      guaiFENesin-dextromethorphan 100-10 MG/5ML syrup  Commonly known as: ROBITUSSIN DM   5 mL, Oral, Every 4 Hours PRN      multivitamin with minerals tablet tablet   1 tablet, Oral, Daily      ondansetron ODT 4 MG disintegrating tablet  Commonly known as: ZOFRAN-ODT   4 mg, Translingual, Every 6 Hours PRN      pantoprazole 40 MG EC tablet  Commonly known as: PROTONIX   TAKE 1 TABLET DAILY ON EMPTY STOMACH HALF AN HOUR BEFORE MEAL      tiZANidine 2 MG tablet  Commonly known as: ZANAFLEX   2 mg, Oral, Nightly PRN      Umeclidinium-Vilanterol 62.5-25 MCG/ACT aerosol powder  inhaler  Commonly known as: ANORO ELLIPTA   1 puff, Inhalation, Daily - RT               Allergies   Allergen Reactions    Daliresp [Roflumilast] Anaphylaxis    Latex Rash    Sulfa Antibiotics Rash         Discharge Disposition:  Home or Self Care    Discharge Diet:  Diet Order   Procedures    Diet: Cardiac Diets; Healthy Heart (2-3 Na+); Texture: Regular Texture (IDDSI 7); Fluid Consistency: Thin (IDDSI 0)       Discharge Activity:   Activity Instructions       Activity as Tolerated              CODE STATUS:    Code Status and Medical Interventions:   Ordered at: 02/10/24 6378     Code Status (Patient has no pulse and is not breathing):    CPR (Attempt to Resuscitate)     Medical Interventions (Patient has pulse or is breathing):    Full        Future Appointments   Date Time Provider Department Center   2/16/2024 10:00 AM Meseret Ruffin MD [facility]   2/22/2024 12:30 PM Johnnie Ang MD [facility]   4/18/2024 10:00 AM [facility]   4/23/2024  2:30 PM Chanda Kate MD [facility]     Additional Instructions for the Follow-ups that You Need to Schedule       Ambulatory Referral to Case Management   As directed      Ambulatory Referral to Home Health ([facility])   As directed      Face to Face Visit Date: 2/13/2024   Follow-up provider for Plan of Care?: I treated the patient in an acute care facility and will not continue treatment after discharge.   Follow-up provider: CHANDA KATE [[id number]]   Reason/Clinical Findings: Exacerbation of COPD, decrease in endurace of mobility   Describe mobility limitations that make leaving home difficult: Requires someone to transport, decrease in level of endurance with activity   Nursing/Therapeutic Services Requested: Skilled Nursing Physical Therapy   Skilled nursing orders: Medication education COPD management Cardiopulmonary assessments   PT orders: Therapeutic exercise Gait Training Strengthening Home safety assessment   Weight Bearing Status: Full Weight Bearing   Frequency: 1 Week 1        Discharge Follow-up with PCP   As directed       Currently Documented PCP:    Chanda Kate MD    PCP Phone Number:    [phone]     Follow Up Details: Follow up with PCP in 7 days.        Discharge Follow-up with PCP   As directed       Currently Documented PCP:    Chanda Kate MD    PCP Phone Number:    [phone]     Follow Up Details: post-hospital follow up        Discharge Follow-up with Specified Provider: Pulmonology 1-3 weeks   As directed      To: Pulmonology 1-3 weeks               Contact information for follow-up providers       Chanda Kate MD .    Specialty: Internal Medicine  Why: Follow up with PCP in 7 days.  Contact  information:  [address] [facility] [address]  [phone]               Azeb Cook RN .    Specialty: Population Health             Meseret Ruffin MD Follow up.    Specialty: Internal Medicine  Why: Appointment on 2/16 at 1000.  Contact information:  [address]  [facility] [address]  [phone]               Chanda Kate MD .    Specialty: Internal Medicine  Why: post-hospital follow up  Contact information:  [address] [facility] [address]  [phone]                       Contact information for after-discharge care       Home Medical Care       [facility] .    Services: Home Nursing, Home Rehabilitation  Contact information:  [address], [address]  [facility] [address]  [phone]                                   Additional Instructions for the Follow-ups that You Need to Schedule       Ambulatory Referral to Case Management   As directed      Ambulatory Referral to Home Health ([facility])   As directed      Face to Face Visit Date: 2/13/2024   Follow-up provider for Plan of Care?: I treated the patient in an acute care facility and will not continue treatment after discharge.   Follow-up provider: CHANDA KATE [[id number]]   Reason/Clinical Findings: Exacerbation of COPD, decrease in endurace of mobility   Describe mobility limitations that make leaving home difficult: Requires someone to transport, decrease in level of endurance with activity   Nursing/Therapeutic Services Requested: Skilled Nursing Physical Therapy   Skilled nursing orders: Medication education COPD management Cardiopulmonary assessments   PT orders: Therapeutic exercise Gait Training Strengthening Home safety assessment   Weight Bearing Status: Full Weight Bearing   Frequency: 1 Week 1        Discharge Follow-up with PCP   As directed       Currently Documented PCP:    Chanda Kate MD    PCP Phone Number:    [phone]     Follow Up Details: Follow up  with PCP in 7 days.        Discharge Follow-up with PCP   As directed       Currently Documented PCP:    Harpreet Kate MD    PCP Phone Number:    [phone]     Follow Up Details: post-hospital follow up        Discharge Follow-up with Specified Provider: Pulmonology 1-3 weeks   As directed      To: Pulmonology 1-3 weeks            Time Spent on Discharge:  I spent greater than 30 minutes on this discharge activity which included: face-to-face encounter with the patient, reviewing the data in the system, coordination of the care with the nursing staff as well as consultants, documentation, and entering orders.       Elisa Tony MD  [facility]  02/14/24  11:15 EST

## 2024-02-14 NOTE — PROGRESS NOTES
PROGRESS NOTE  Patient Name: Tony Leonard  Age/Sex: 83 y.o. male  : 1940  MRN: [id number]    Date of Admission: 2/10/2024  Date of Encounter Visit: 24   LOS: 1 day   Patient Care Team:  Harpreet Kate MD as PCP - General (Internal Medicine)  Katharina Salter MD as Consulting Physician (Pulmonary Disease)  Anum Bhatt MD as Consulting Physician (Pain Medicine)  Azeb Cook, ANUSHA as Ambulatory  ([facility])    Chief Complaint: Asthma exacerbation    Hospital course: Patient reevaluated yesterday and the discharge was delayed because he was still having issues with retained secretions.  Aggressive pulmonary hygiene measures were reinforced and reinitiated and the patient was able to expectorate better and he does sound a lot better on exam today.  He is to be discharged home on prednisone and he already have the 7% hypertonic saline the DuoNebs and the percussion vest and the flutter valve at home and has been on them for a while so he is pretty family or how to continue that after discharge         REVIEW OF SYSTEMS:   CONSTITUTIONAL: no fever or chills  CARDIOVASCULAR: No chest pain, chest pressure or chest discomfort. No palpitations.  Improving lower extremities edema   RESPIRATORY: Improving rhonchi, better ability to expectorate compared to yesterday.   GASTROINTESTINAL: No anorexia, nausea, vomiting or diarrhea. No abdominal pain or blood.   HEMATOLOGIC: No bleeding or bruising.     Ventilator/Non-Invasive Ventilation Settings (From admission, onward)      None              Vital Signs  Temp:  [97.6 °F (36.4 °C)-98.3 °F (36.8 °C)] 98.1 °F (36.7 °C)  Heart Rate:  [79-97] 83  Resp:  [18-20] 20  BP: (122-153)/(59-75) 153/75  SpO2:  [95 %-97 %] 96 %  on  Flow (L/min):  [1-3] 1 Device (Oxygen Therapy): nasal cannula    Intake/Output Summary (Last 24 hours) at 2024 1004  Last data filed at 2024 0837  Gross per 24 hour   Intake 1200 ml   Output 1470 ml   Net -270  "ml     Flowsheet Rows      Flowsheet Row First Filed Value   Admission Height 180.3 cm (71\") Documented at 02/10/2024 1543   Admission Weight 85.7 kg (189 lb) Documented at 02/10/2024 1543          Body mass index is 26.32 kg/m².      02/12/24  0533 02/13/24  0500 02/14/24  0619   Weight: 82.6 kg (182 lb 1.6 oz) 84.2 kg (185 lb 10 oz) 85.6 kg (188 lb 11.4 oz)       Physical Exam:  GEN:  No acute distress, alert, cooperative, well developed   EYES:   Sclerae clear. No icterus. PERRL. Normal EOM  ENT:   External ears/nose normal, no oral lesions, no thrush, mucous membranes moist  NECK:  Supple, midline trachea, no JVD  LUNGS: Normal chest on inspection, much improved, no more coarse rhonchi, no wheezing respirations regular, even and unlabored.   CV:  Regular rhythm and rate. Normal S1/S2. No murmurs, gallops, or rubs noted.  ABD:  Soft, nontender and nondistended. Normal bowel sounds. No guarding  EXT:  Moves all extremities well. No cyanosis. No redness.  2+ pitting edema but seems improving compared to earlier.   Skin: Dry, intact, no bleeding    Results Review:    Results From Last 14 Days   Lab Units 02/10/24  1617 02/06/24  2228   LACTATE mmol/L 1.5 1.2     Results from last 7 days   Lab Units 02/14/24  0454 02/13/24  0404 02/12/24  0506 02/11/24  0327 02/10/24  1617   SODIUM mmol/L 138 139 139 140 141   POTASSIUM mmol/L 4.2 4.1 4.1 4.6 4.3   CHLORIDE mmol/L 108* 108* 106 103 102   CO2 mmol/L 23.8 21.7* 22.5 19.0* 22.2   BUN mg/dL 30* 24* 24* 23 25*   CREATININE mg/dL 0.98 0.98 0.96 0.90 1.02   CALCIUM mg/dL 8.5* 8.4* 8.4* 8.8 9.1   AST (SGOT) U/L 8 11 13  --  25   ALT (SGPT) U/L 13 12 14  --  21   ANION GAP mmol/L 6.2 9.3 10.5 18.0* 16.8*   ALBUMIN g/dL 3.3* 3.4* 3.6  --  4.3     Results from last 7 days   Lab Units 02/10/24  1846 02/10/24  1617   HSTROP T ng/L 31* 42*         Results from last 7 days   Lab Units 02/10/24  1617   PROBNP pg/mL 115.0     Results from last 7 days   Lab Units 02/14/24  0454 " "02/13/24  0404 02/12/24  0506 02/11/24  0327 02/10/24  1617   WBC 10*3/mm3 9.07 9.19 7.69 3.22* 6.91   HEMOGLOBIN g/dL 9.8* 9.9* 9.5* 10.6* 11.7*   HEMATOCRIT % 31.7* 30.9* 29.6* 33.2* 37.1*   PLATELETS 10*3/mm3 282 267 255 265 300   MCV fL 82.8 83.5 83.4 82.2 84.1   NEUTROPHIL % % 75.1 86.9* 90.5*  --  57.7   LYMPHOCYTE % % 16.0* 8.7* 6.8*  --  24.6   MONOCYTES % % 8.2 3.8* 2.3*  --  9.3   EOSINOPHIL % % 0.0* 0.0* 0.0*  --  7.1*   BASOPHIL % % 0.0 0.1 0.0  --  1.0   IMM GRAN % % 0.7* 0.5 0.4  --  0.3                   Invalid input(s): \"LDLCALC\"          No results found for: \"POCGLU\"  Results from last 7 days   Lab Units 02/10/24  1617   LACTATE mmol/L 1.5     Results from last 7 days   Lab Units 02/10/24  1618   BLOODCX  No growth at 3 days  No growth at 3 days                         Imaging:   Imaging Results (All)                 I reviewed the patient's new clinical results.  I personally viewed and interpreted the patient's imaging results:        Medication Review:   acetylcysteine, 3 mL, Nebulization, 4x Daily - RT  arformoterol, 15 mcg, Nebulization, BID - RT   And  tiotropium bromide monohydrate, 2 puff, Inhalation, Daily - RT  enoxaparin, 40 mg, Subcutaneous, Nightly  FLUoxetine, 20 mg, Oral, Daily  gabapentin, 300 mg, Oral, TID  guaiFENesin, 1,200 mg, Oral, Q12H  ipratropium-albuterol, 3 mL, Nebulization, Q6H While Awake - RT  multivitamin with minerals, 1 tablet, Oral, Daily  pantoprazole, 40 mg, Oral, Q AM  predniSONE, 40 mg, Oral, Daily With Breakfast             ASSESSMENT:   COPD with exacerbation  History of recurrent mucous plugging  Nocturnal hypoxemia  ADHD  Gastroesophageal flux disease  Chronic heart failure with preserved EF    PLAN:  At this point no infection is suspected, no antibiotic necessary   No more retained secretion  Okay to discharge home on prednisone taper over the next 6-8 days  Patient is to continue his percussion vest/hypertonic saline nebulizer/DuoNeb/flutter valve after " discharge  Need to follow-up with him sooner than scheduled I will arrange for 1 in 3 weeks  Discussed with patient, primary team and nursing staff  Labs/Notes/films were independently reviewed and pertinent results are summarized above  The copied texts in this note were reviewed and they are accurate as of 02/14/24    Disposition: Home    Katharina Salter MD  02/14/24  10:04 EST           Dictated utilizing Dragon dictation

## 2024-02-15 ENCOUNTER — TRANSITIONAL CARE MANAGEMENT TELEPHONE ENCOUNTER (OUTPATIENT)
Dept: CALL CENTER | Facility: HOSPITAL | Age: 84
End: 2024-02-15
Payer: MEDICARE

## 2024-02-15 LAB
BACTERIA SPEC AEROBE CULT: NORMAL
BACTERIA SPEC AEROBE CULT: NORMAL

## 2024-02-16 ENCOUNTER — TRANSITIONAL CARE MANAGEMENT TELEPHONE ENCOUNTER (OUTPATIENT)
Dept: CALL CENTER | Facility: HOSPITAL | Age: 84
End: 2024-02-16
Payer: MEDICARE

## 2024-02-16 NOTE — PROGRESS NOTES
Enter Query Response Below      Query Response:     Acute respiratory failure was supported     Electronically signed by Olaf Garzon MD, 24, 5:58 PM EST.               If applicable, please update the problem list.   Patient: Tony Leonard        : 1940  Account: [id number]           Admit Date:         How to Respond to this query:       a. Click New Note     b. Answer query within the yellow box.                c. Update the Problem List, if applicable.      If you have any questions about this query contact me at: [email or web address]      Dr. Garzon,    Patient noted to have acute hypoxic respiratory failure.  RR 16-24, SpO2 %. Treated with up to 3 L/min O2.  Patient noted to have shortness of breath and tachypnea.  Patient uses 2 L/min O2 at home with sleep.  No ABG drawn.    After study, was acute respiratory failure clinically supported during this admission?    Acute respiratory failure was supported with additional clinical indicators:____________  Acute respiratory failure was not supported  Other- specify_____________  Unable to determine      By submitting this query, we are merely seeking further clarification of documentation to accurately reflect all conditions that you are monitoring, evaluating, treating or that extend the hospitalization or utilize additional resources of care. Please utilize your independent clinical judgment when addressing the question(s) above.     This query and your response, once completed, will be entered into the legal medical record.    Sincerely,  Caren Limon  Clinical Documentation Integrity Program

## 2024-02-16 NOTE — PROGRESS NOTES
"Enter Query Response Below      Query Response:     mild malnutrition was supported     Electronically signed by Olaf Garzon MD, 24, 5:57 PM EST.               If applicable, please update the problem list.   Patient: Tony Leonard        : 1940  Account: [id number]           Admit Date:         How to Respond to this query:       a. Click New Note     b. Answer query within the yellow box.                c. Update the Problem List, if applicable.      If you have any questions about this query contact me at: [email or web address]      Dr. Garzon,    Patient noted to have mild malnutrition.  No dietician consult.  There is no documented weight loss, insufficient energy intake, loss of muscle mass or fat, fluid accumulation or diminished functional status. Height 71\", Weight 185#, BMI 25.9.  Cardiac diet ordered.     After study, was mild malnutrition clinically supported during this admission?    mild malnutrition was supported with additional clinical indicators:____________  mild malnutrition was not supported  Other- specify _____________  Unable to determine     ASPEN criteria for malnutrition requires the presence of at least two of the following: insufficient energy intake, weight loss, loss of muscle mass,  loss of subcutaneous fat, localized or generalized fluid accumulation, diminished functional status (measured by calibrated hand  strength).      By submitting this query, we are merely seeking further clarification of documentation to accurately reflect all conditions that you are monitoring, evaluating, treating or that extend the hospitalization or utilize additional resources of care. Please utilize your independent clinical judgment when addressing the question(s) above.     This query and your response, once completed, will be entered into the legal medical record.    Sincerely,  Caren Limon  Clinical Documentation Integrity Program     "

## 2024-02-19 ENCOUNTER — PATIENT OUTREACH (OUTPATIENT)
Dept: CASE MANAGEMENT | Facility: OTHER | Age: 84
End: 2024-02-19
Payer: MEDICARE

## 2024-02-19 ENCOUNTER — NURSE TRIAGE (OUTPATIENT)
Dept: CALL CENTER | Facility: HOSPITAL | Age: 84
End: 2024-02-19
Payer: MEDICARE

## 2024-02-19 DIAGNOSIS — M54.40 CHRONIC LOW BACK PAIN WITH SCIATICA, SCIATICA LATERALITY UNSPECIFIED, UNSPECIFIED BACK PAIN LATERALITY: ICD-10-CM

## 2024-02-19 DIAGNOSIS — G89.29 CHRONIC LOW BACK PAIN WITH SCIATICA, SCIATICA LATERALITY UNSPECIFIED, UNSPECIFIED BACK PAIN LATERALITY: ICD-10-CM

## 2024-02-19 DIAGNOSIS — K21.9 GASTROESOPHAGEAL REFLUX DISEASE WITHOUT ESOPHAGITIS: ICD-10-CM

## 2024-02-19 RX ORDER — PANTOPRAZOLE SODIUM 40 MG/1
40 TABLET, DELAYED RELEASE ORAL DAILY
Qty: 90 TABLET | Refills: 3 | Status: SHIPPED | OUTPATIENT
Start: 2024-02-19

## 2024-02-19 RX ORDER — ELECTROLYTES/DEXTROSE
1 SOLUTION, ORAL ORAL DAILY
Qty: 30 TABLET | Refills: 11 | Status: SHIPPED | OUTPATIENT
Start: 2024-02-19

## 2024-02-19 RX ORDER — ALBUTEROL SULFATE 2.5 MG/3ML
2.5 SOLUTION RESPIRATORY (INHALATION) EVERY 6 HOURS PRN
Qty: 360 ML | Refills: 3 | Status: SHIPPED | OUTPATIENT
Start: 2024-02-19

## 2024-02-19 RX ORDER — GABAPENTIN 300 MG/1
300 CAPSULE ORAL 3 TIMES DAILY
Qty: 20 CAPSULE | Refills: 0 | Status: SHIPPED | OUTPATIENT
Start: 2024-02-19

## 2024-02-19 RX ORDER — FLUOXETINE HYDROCHLORIDE 20 MG/1
20 CAPSULE ORAL DAILY
Qty: 90 CAPSULE | Refills: 3 | Status: CANCELLED | OUTPATIENT
Start: 2024-02-19

## 2024-02-19 NOTE — OUTREACH NOTE
AMBULATORY CASE MANAGEMENT NOTE    Name and Relationship of Patient/Support Person: Tony Leonard L - Self  Self    Patient Outreach    Outreaches x 2 today.  He requested a call back this am due to not available to talk.  Second outreach patient states he is outside, unable to review medications and appointments as he is outside enjoying the weather.  He prefers an outreach tomorrow and this has been scheduled.      Azeb PHAM  Ambulatory Case Management    2/19/2024, 16:56 EST

## 2024-02-19 NOTE — TELEPHONE ENCOUNTER
Caller: [facility] at [place] - [place] [id number] - [phone] [facility] [phone] FX    Relationship: Pharmacy  GRUPO Segura call back number: [phone]*    Requested Prescriptions:   Requested Prescriptions     Pending Prescriptions Disp Refills    pantoprazole (PROTONIX) 40 MG EC tablet 90 tablet 3    multivitamin with minerals (Multivitamin Adult) tablet tablet       Sig: Take  by mouth.    gabapentin (NEURONTIN) 300 MG capsule 20 capsule 0     Sig: Take 1 capsule by mouth 3 (Three) Times a Day.    FLUoxetine (PROzac) 20 MG capsule 90 capsule 3     Sig: Take 1 capsule by mouth Daily.    Umeclidinium-Vilanterol (ANORO ELLIPTA) 62.5-25 MCG/ACT aerosol powder  inhaler       Sig: Inhale 1 puff Daily.    albuterol (PROVENTIL) (2.5 MG/3ML) 0.083% nebulizer solution 360 mL 3     Sig: Take 2.5 mg by nebulization Every 6 (Six) Hours As Needed for Wheezing.        Pharmacy where request should be sent: [facility] AT [place] - [place] [id number] - [phone] [facility] [phone] FX     Last office visit with prescribing clinician: 10/19/2023   Last telemedicine visit with prescribing clinician: Visit date not found   Next office visit with prescribing clinician: 2/26/2024     Additional details provided by patient: [facility] REQUESTING REFILLS    Does the patient have less than a 3 day supply:  [] Yes  [x] No    Would you like a call back once the refill request has been completed: [] Yes [x] No    If the office needs to give you a call back, can they leave a voicemail: [] Yes [x] No    Jesu Sanchez   02/19/24 11:05 EST

## 2024-02-19 NOTE — TELEPHONE ENCOUNTER
"Reason for Disposition   [1] Follow-up call to recent contact AND [2] information only call, no triage required    Additional Information   Negative: [1] Caller is not with the adult (patient) AND [2] reporting urgent symptoms   Negative: Lab result questions   Negative: Medication questions   Negative: Caller can't be reached by phone   Negative: Caller has already spoken to PCP or another triager   Negative: RN needs further essential information from caller in order to complete triage   Negative: Requesting regular office appointment   Negative: [1] Caller requesting NON-URGENT health information AND [2] PCP's office is the best resource   Negative: Health Information question, no triage required and triager able to answer question   Negative: General information question, no triage required and triager able to answer question   Negative: Question about upcoming scheduled test, no triage required and triager able to answer question   Negative: [1] Caller is not with the adult (patient) AND [2] probable NON-URGENT symptoms    Answer Assessment - Initial Assessment Questions  1. REASON FOR CALL or QUESTION: \"What is your reason for calling today?\" or \"How can I best help you?\" or \"What question do you have that I can help answer?\"      Asking for Dr. Ruffin number    Protocols used: Information Only Call - No Triage-ADULT-    "

## 2024-02-19 NOTE — TELEPHONE ENCOUNTER
Rx Refill Note  Requested Prescriptions     Pending Prescriptions Disp Refills    pantoprazole (PROTONIX) 40 MG EC tablet 90 tablet 3    multivitamin with minerals (Multivitamin Adult) tablet tablet       Sig: Take  by mouth.    gabapentin (NEURONTIN) 300 MG capsule 20 capsule 0     Sig: Take 1 capsule by mouth 3 (Three) Times a Day.    FLUoxetine (PROzac) 20 MG capsule 90 capsule 3     Sig: Take 1 capsule by mouth Daily.    Umeclidinium-Vilanterol (ANORO ELLIPTA) 62.5-25 MCG/ACT aerosol powder  inhaler       Sig: Inhale 1 puff Daily.    albuterol (PROVENTIL) (2.5 MG/3ML) 0.083% nebulizer solution 360 mL 3     Sig: Take 2.5 mg by nebulization Every 6 (Six) Hours As Needed for Wheezing.      Last office visit with prescribing clinician: 10/19/2023   Last telemedicine visit with prescribing clinician: Visit date not found   Next office visit with prescribing clinician: 2/26/2024                         Would you like a call back once the refill request has been completed: [] Yes [] No    If the office needs to give you a call back, can they leave a voicemail: [] Yes [] No    Cherelle Hansen Rep  02/19/24, 14:02 [facility] Refill Note  Requested Prescriptions     Pending Prescriptions Disp Refills    pantoprazole (PROTONIX) 40 MG EC tablet 90 tablet 3    multivitamin with minerals (Multivitamin Adult) tablet tablet       Sig: Take  by mouth.    gabapentin (NEURONTIN) 300 MG capsule 20 capsule 0     Sig: Take 1 capsule by mouth 3 (Three) Times a Day.    FLUoxetine (PROzac) 20 MG capsule 90 capsule 3     Sig: Take 1 capsule by mouth Daily.    Umeclidinium-Vilanterol (ANORO ELLIPTA) 62.5-25 MCG/ACT aerosol powder  inhaler       Sig: Inhale 1 puff Daily.    albuterol (PROVENTIL) (2.5 MG/3ML) 0.083% nebulizer solution 360 mL 3     Sig: Take 2.5 mg by nebulization Every 6 (Six) Hours As Needed for Wheezing.      Last office visit with prescribing clinician: 10/19/2023   Last telemedicine visit with prescribing clinician:  Visit date not found   Next office visit with prescribing clinician: 2/26/2024                         Would you like a call back once the refill request has been completed: [] Yes [] No    If the office needs to give you a call back, can they leave a voicemail: [] Yes [] No    Cherelle Hansen Rep  02/19/24, 14:02 EST

## 2024-02-20 ENCOUNTER — PATIENT OUTREACH (OUTPATIENT)
Dept: CASE MANAGEMENT | Facility: OTHER | Age: 84
End: 2024-02-20
Payer: MEDICARE

## 2024-02-20 NOTE — OUTREACH NOTE
AMBULATORY CASE MANAGEMENT NOTE    Name and Relationship of Patient/Support Person: [facility] -     Care Coordination    Spoke with Sarita with [facility].  Verified that the patient has been accepted.  He has orders for PT.  They have outreached for appointment and he has a visit today.  Requested a  be added to his HH if possible.  Plan to outreach today to patient.      Azeb PHAM  Ambulatory Case Management    2/20/2024, 08:49 EST

## 2024-02-22 ENCOUNTER — PATIENT OUTREACH (OUTPATIENT)
Dept: CASE MANAGEMENT | Facility: OTHER | Age: 84
End: 2024-02-22
Payer: MEDICARE

## 2024-02-22 NOTE — OUTREACH NOTE
AMBULATORY CASE MANAGEMENT NOTE    Name and Relationship of Patient/Support Person: Tony Leonard - Self  Self    Adult Patient Profile  Questions/Answers      Flowsheet Row Most Recent Value   Musculoskeletal Symptoms/Conditions back pain   Musculoskeletal Management Strategies routine screening, medication therapy   Musculoskeletal Self-Management Outcome 3 (uncertain)   Musculoskeletal Comment reports pain is worse in the morning but declined to discuss more today   Identifying Life Goals --  [he declines to discuss]   Barriers to Taking Medication as Prescribed --  [patient refused to review medications]          Patient Outreach    Outgoing call to the patient for follow up.  He states that [facility] has been out and he thinks that they will be working with him on Friday doing exercises.  Attempted to review medications and he declines at this time.  He does state he is taking his medications and has not trouble affording them. Plan to follow up with [facility] to verify is he needs assistance with his medications as I have verified that he is current with them.  I did ask for a  to outreach to him from [facility].  He states that his son does assist him with any needs for shopping, etc, when he needs this. He declines to discuss care goals or review care plans today.  He was agreeable to have [facility] follow up in one month. This outreach has been scheduled.     Azeb PHAM  Ambulatory Case Management    2/22/2024, 10:28 EST

## 2024-03-25 ENCOUNTER — PATIENT OUTREACH (OUTPATIENT)
Dept: CASE MANAGEMENT | Facility: OTHER | Age: 84
End: 2024-03-25
Payer: MEDICARE

## 2024-03-25 NOTE — OUTREACH NOTE
AMBULATORY CASE MANAGEMENT NOTE    Name and Relationship of Patient/Support Person: MADAN OLIVAS- Maria C - Other  Other    Patient Outreach    Outgoing call to MADAN OLIVAS and spoke with Maria C to verify if patient is current.  Noted he has missed provider appointments.  She states that the patient in on hold status and is currently in skilled rehab at [facility].  Outreach scheduled for one month follow up to monitor chart.  Patient had been UTR.     Azeb PHAM  Ambulatory Case Management    3/25/2024, 10:23 EDT

## 2024-04-08 ENCOUNTER — HOSPITAL ENCOUNTER (OUTPATIENT)
Facility: HOSPITAL | Age: 84
Setting detail: OBSERVATION
Discharge: NURSING, REHAB OR LONG-TERM CARE | End: 2024-04-09
Attending: EMERGENCY MEDICINE | Admitting: HOSPITALIST
Payer: MEDICARE

## 2024-04-08 ENCOUNTER — APPOINTMENT (OUTPATIENT)
Dept: GENERAL RADIOLOGY | Facility: HOSPITAL | Age: 84
End: 2024-04-08
Payer: MEDICARE

## 2024-04-08 ENCOUNTER — APPOINTMENT (OUTPATIENT)
Dept: CT IMAGING | Facility: HOSPITAL | Age: 84
End: 2024-04-08
Payer: MEDICARE

## 2024-04-08 DIAGNOSIS — M54.40 CHRONIC LOW BACK PAIN WITH SCIATICA, SCIATICA LATERALITY UNSPECIFIED, UNSPECIFIED BACK PAIN LATERALITY: ICD-10-CM

## 2024-04-08 DIAGNOSIS — S09.90XA CLOSED HEAD INJURY, INITIAL ENCOUNTER: ICD-10-CM

## 2024-04-08 DIAGNOSIS — G89.29 CHRONIC LOW BACK PAIN WITH SCIATICA, SCIATICA LATERALITY UNSPECIFIED, UNSPECIFIED BACK PAIN LATERALITY: ICD-10-CM
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
## 2024-04-08 DIAGNOSIS — R42 DIZZINESS: Primary | ICD-10-CM

## 2024-04-08 DIAGNOSIS — R79.89 ELEVATED TROPONIN: ICD-10-CM

## 2024-04-08 DIAGNOSIS — S16.1XXA STRAIN OF NECK MUSCLE, INITIAL ENCOUNTER: ICD-10-CM

## 2024-04-08 LAB
ALBUMIN SERPL-MCNC: 3.5 G/DL (ref 3.5–5.2)
ALBUMIN/GLOB SERPL: 1.8 G/DL
ALP SERPL-CCNC: 47 U/L (ref 39–117)
ALT SERPL W P-5'-P-CCNC: 24 U/L (ref 1–41)
ANION GAP SERPL CALCULATED.3IONS-SCNC: 8 MMOL/L (ref 5–15)
AST SERPL-CCNC: 19 U/L (ref 1–40)
BASOPHILS # BLD AUTO: 0.01 10*3/MM3 (ref 0–0.2)
BASOPHILS NFR BLD AUTO: 0.1 % (ref 0–1.5)
BILIRUB SERPL-MCNC: 0.2 MG/DL (ref 0–1.2)
BUN SERPL-MCNC: 23 MG/DL (ref 8–23)
BUN/CREAT SERPL: 23.7 (ref 7–25)
CA-I BLD-MCNC: 4.9 MG/DL (ref 4.6–5.4)
CA-I SERPL ISE-MCNC: 1.22 MMOL/L (ref 1.15–1.35)
CALCIUM SPEC-SCNC: 7.8 MG/DL (ref 8.6–10.5)
CHLORIDE SERPL-SCNC: 110 MMOL/L (ref 98–107)
CO2 SERPL-SCNC: 23 MMOL/L (ref 22–29)
CREAT SERPL-MCNC: 0.97 MG/DL (ref 0.76–1.27)
DEPRECATED RDW RBC AUTO: 51.3 FL (ref 37–54)
EGFRCR SERPLBLD CKD-EPI 2021: 76.5 ML/MIN/1.73
EOSINOPHIL # BLD AUTO: 0 10*3/MM3 (ref 0–0.4)
EOSINOPHIL NFR BLD AUTO: 0 % (ref 0.3–6.2)
ERYTHROCYTE [DISTWIDTH] IN BLOOD BY AUTOMATED COUNT: 16.9 % (ref 12.3–15.4)
GLOBULIN UR ELPH-MCNC: 1.9 GM/DL
GLUCOSE BLDC GLUCOMTR-MCNC: 105 MG/DL (ref 70–130)
GLUCOSE SERPL-MCNC: 110 MG/DL (ref 65–99)
HCT VFR BLD AUTO: 32.9 % (ref 37.5–51)
HGB BLD-MCNC: 10.3 G/DL (ref 13–17.7)
HOLD SPECIMEN: NORMAL
IMM GRANULOCYTES # BLD AUTO: 0.1 10*3/MM3 (ref 0–0.05)
IMM GRANULOCYTES NFR BLD AUTO: 1.4 % (ref 0–0.5)
LYMPHOCYTES # BLD AUTO: 1.27 10*3/MM3 (ref 0.7–3.1)
LYMPHOCYTES NFR BLD AUTO: 17.3 % (ref 19.6–45.3)
MAGNESIUM SERPL-MCNC: 2.1 MG/DL (ref 1.6–2.4)
MCH RBC QN AUTO: 26.6 PG (ref 26.6–33)
MCHC RBC AUTO-ENTMCNC: 31.3 G/DL (ref 31.5–35.7)
MCV RBC AUTO: 85 FL (ref 79–97)
MONOCYTES # BLD AUTO: 0.77 10*3/MM3 (ref 0.1–0.9)
MONOCYTES NFR BLD AUTO: 10.5 % (ref 5–12)
NEUTROPHILS NFR BLD AUTO: 5.19 10*3/MM3 (ref 1.7–7)
NEUTROPHILS NFR BLD AUTO: 70.7 % (ref 42.7–76)
NRBC BLD AUTO-RTO: 0 /100 WBC (ref 0–0.2)
PLATELET # BLD AUTO: 166 10*3/MM3 (ref 140–450)
PMV BLD AUTO: 8.8 FL (ref 6–12)
POTASSIUM SERPL-SCNC: 4.6 MMOL/L (ref 3.5–5.2)
PROT SERPL-MCNC: 5.4 G/DL (ref 6–8.5)
RBC # BLD AUTO: 3.87 10*6/MM3 (ref 4.14–5.8)
SODIUM SERPL-SCNC: 141 MMOL/L (ref 136–145)
TROPONIN T SERPL HS-MCNC: 40 NG/L
WBC NRBC COR # BLD AUTO: 7.34 10*3/MM3 (ref 3.4–10.8)
WHOLE BLOOD HOLD COAG: NORMAL
WHOLE BLOOD HOLD SPECIMEN: NORMAL

## 2024-04-08 PROCEDURE — 93005 ELECTROCARDIOGRAM TRACING: CPT

## 2024-04-08 PROCEDURE — 71045 X-RAY EXAM CHEST 1 VIEW: CPT

## 2024-04-08 PROCEDURE — 85025 COMPLETE CBC W/AUTO DIFF WBC: CPT

## 2024-04-08 PROCEDURE — 93010 ELECTROCARDIOGRAM REPORT: CPT | Performed by: INTERNAL MEDICINE

## 2024-04-08 PROCEDURE — 83735 ASSAY OF MAGNESIUM: CPT

## 2024-04-08 PROCEDURE — 80053 COMPREHEN METABOLIC PANEL: CPT

## 2024-04-08 PROCEDURE — 72125 CT NECK SPINE W/O DYE: CPT

## 2024-04-08 PROCEDURE — 99285 EMERGENCY DEPT VISIT HI MDM: CPT

## 2024-04-08 PROCEDURE — 72192 CT PELVIS W/O DYE: CPT

## 2024-04-08 PROCEDURE — 82330 ASSAY OF CALCIUM: CPT | Performed by: NURSE PRACTITIONER

## 2024-04-08 PROCEDURE — 84484 ASSAY OF TROPONIN QUANT: CPT

## 2024-04-08 PROCEDURE — 82948 REAGENT STRIP/BLOOD GLUCOSE: CPT

## 2024-04-08 PROCEDURE — 70450 CT HEAD/BRAIN W/O DYE: CPT

## 2024-04-08 RX ORDER — SODIUM CHLORIDE 0.9 % (FLUSH) 0.9 %
10 SYRINGE (ML) INJECTION AS NEEDED
Status: DISCONTINUED | OUTPATIENT
Start: 2024-04-08 | End: 2024-04-09

## 2024-04-09 VITALS
OXYGEN SATURATION: 96 % | TEMPERATURE: 98.2 F | HEART RATE: 79 BPM | WEIGHT: 189.82 LBS | SYSTOLIC BLOOD PRESSURE: 140 MMHG | DIASTOLIC BLOOD PRESSURE: 73 MMHG | HEIGHT: 71 IN | RESPIRATION RATE: 20 BRPM | BODY MASS INDEX: 26.57 KG/M2

## 2024-04-09 PROBLEM — S70.00XA CONTUSION OF HIP: Status: ACTIVE | Noted: 2024-04-09

## 2024-04-09 PROBLEM — R42 DIZZINESS: Status: ACTIVE | Noted: 2024-04-09

## 2024-04-09 LAB
ANION GAP SERPL CALCULATED.3IONS-SCNC: 10 MMOL/L (ref 5–15)
BILIRUB UR QL STRIP: NEGATIVE
BUN SERPL-MCNC: 20 MG/DL (ref 8–23)
BUN/CREAT SERPL: 22 (ref 7–25)
CALCIUM SPEC-SCNC: 8.1 MG/DL (ref 8.6–10.5)
CHLORIDE SERPL-SCNC: 110 MMOL/L (ref 98–107)
CLARITY UR: CLEAR
CO2 SERPL-SCNC: 22 MMOL/L (ref 22–29)
COLOR UR: YELLOW
CREAT SERPL-MCNC: 0.91 MG/DL (ref 0.76–1.27)
DEPRECATED RDW RBC AUTO: 50.4 FL (ref 37–54)
EGFRCR SERPLBLD CKD-EPI 2021: 82.6 ML/MIN/1.73
ERYTHROCYTE [DISTWIDTH] IN BLOOD BY AUTOMATED COUNT: 16.7 % (ref 12.3–15.4)
GEN 5 2HR TROPONIN T REFLEX: 39 NG/L
GLUCOSE SERPL-MCNC: 114 MG/DL (ref 65–99)
GLUCOSE UR STRIP-MCNC: NEGATIVE MG/DL
HCT VFR BLD AUTO: 31.7 % (ref 37.5–51)
HGB BLD-MCNC: 10.1 G/DL (ref 13–17.7)
HGB UR QL STRIP.AUTO: NEGATIVE
KETONES UR QL STRIP: NEGATIVE
LEUKOCYTE ESTERASE UR QL STRIP.AUTO: NEGATIVE
MCH RBC QN AUTO: 26.7 PG (ref 26.6–33)
MCHC RBC AUTO-ENTMCNC: 31.9 G/DL (ref 31.5–35.7)
MCV RBC AUTO: 83.9 FL (ref 79–97)
NITRITE UR QL STRIP: NEGATIVE
PH UR STRIP.AUTO: 6 [PH] (ref 5–8)
PLATELET # BLD AUTO: 174 10*3/MM3 (ref 140–450)
PMV BLD AUTO: 9.2 FL (ref 6–12)
POTASSIUM SERPL-SCNC: 4.2 MMOL/L (ref 3.5–5.2)
PROT UR QL STRIP: NEGATIVE
QT INTERVAL: 357 MS
QTC INTERVAL: 427 MS
RBC # BLD AUTO: 3.78 10*6/MM3 (ref 4.14–5.8)
SODIUM SERPL-SCNC: 142 MMOL/L (ref 136–145)
SP GR UR STRIP: 1.02 (ref 1–1.03)
TROPONIN T DELTA: 0 NG/L
TROPONIN T SERPL HS-MCNC: 39 NG/L
TROPONIN T SERPL HS-MCNC: 46 NG/L
UROBILINOGEN UR QL STRIP: NORMAL
WBC NRBC COR # BLD AUTO: 6.87 10*3/MM3 (ref 3.4–10.8)

## 2024-04-09 PROCEDURE — 80048 BASIC METABOLIC PNL TOTAL CA: CPT | Performed by: NURSE PRACTITIONER

## 2024-04-09 PROCEDURE — G0378 HOSPITAL OBSERVATION PER HR: HCPCS

## 2024-04-09 PROCEDURE — 94799 UNLISTED PULMONARY SVC/PX: CPT

## 2024-04-09 PROCEDURE — 36415 COLL VENOUS BLD VENIPUNCTURE: CPT | Performed by: NURSE PRACTITIONER

## 2024-04-09 PROCEDURE — 84484 ASSAY OF TROPONIN QUANT: CPT | Performed by: NURSE PRACTITIONER

## 2024-04-09 PROCEDURE — 97116 GAIT TRAINING THERAPY: CPT

## 2024-04-09 PROCEDURE — 81003 URINALYSIS AUTO W/O SCOPE: CPT

## 2024-04-09 PROCEDURE — 94761 N-INVAS EAR/PLS OXIMETRY MLT: CPT

## 2024-04-09 PROCEDURE — 85027 COMPLETE CBC AUTOMATED: CPT | Performed by: NURSE PRACTITIONER

## 2024-04-09 PROCEDURE — 94760 N-INVAS EAR/PLS OXIMETRY 1: CPT

## 2024-04-09 PROCEDURE — 94640 AIRWAY INHALATION TREATMENT: CPT

## 2024-04-09 PROCEDURE — 97161 PT EVAL LOW COMPLEX 20 MIN: CPT

## 2024-04-09 RX ORDER — POLYETHYLENE GLYCOL 3350 17 G/17G
17 POWDER, FOR SOLUTION ORAL DAILY PRN
Status: DISCONTINUED | OUTPATIENT
Start: 2024-04-09 | End: 2024-04-09 | Stop reason: HOSPADM

## 2024-04-09 RX ORDER — SODIUM CHLORIDE FOR INHALATION 7 %
4 VIAL, NEBULIZER (ML) INHALATION EVERY 4 HOURS PRN
Status: DISCONTINUED | OUTPATIENT
Start: 2024-04-09 | End: 2024-04-09 | Stop reason: HOSPADM

## 2024-04-09 RX ORDER — ACETAMINOPHEN 160 MG/5ML
650 SOLUTION ORAL EVERY 4 HOURS PRN
Status: DISCONTINUED | OUTPATIENT
Start: 2024-04-09 | End: 2024-04-09

## 2024-04-09 RX ORDER — GABAPENTIN 300 MG/1
300 CAPSULE ORAL 3 TIMES DAILY
Status: DISCONTINUED | OUTPATIENT
Start: 2024-04-09 | End: 2024-04-09 | Stop reason: HOSPADM

## 2024-04-09 RX ORDER — SODIUM CHLORIDE 9 MG/ML
100 INJECTION, SOLUTION INTRAVENOUS CONTINUOUS
Status: DISCONTINUED | OUTPATIENT
Start: 2024-04-09 | End: 2024-04-09

## 2024-04-09 RX ORDER — CALCIUM CARBONATE 500 MG/1
2 TABLET, CHEWABLE ORAL 2 TIMES DAILY PRN
Status: DISCONTINUED | OUTPATIENT
Start: 2024-04-09 | End: 2024-04-09 | Stop reason: HOSPADM

## 2024-04-09 RX ORDER — GUAIFENESIN 600 MG/1
600 TABLET, EXTENDED RELEASE ORAL 2 TIMES DAILY PRN
Start: 2024-04-09

## 2024-04-09 RX ORDER — SODIUM CHLORIDE 0.9 % (FLUSH) 0.9 %
10 SYRINGE (ML) INJECTION AS NEEDED
Status: DISCONTINUED | OUTPATIENT
Start: 2024-04-09 | End: 2024-04-09 | Stop reason: HOSPADM

## 2024-04-09 RX ORDER — FLUOXETINE HYDROCHLORIDE 20 MG/1
20 CAPSULE ORAL DAILY
Status: DISCONTINUED | OUTPATIENT
Start: 2024-04-09 | End: 2024-04-09 | Stop reason: HOSPADM

## 2024-04-09 RX ORDER — PANTOPRAZOLE SODIUM 40 MG/1
40 TABLET, DELAYED RELEASE ORAL DAILY
Status: DISCONTINUED | OUTPATIENT
Start: 2024-04-09 | End: 2024-04-09 | Stop reason: HOSPADM

## 2024-04-09 RX ORDER — SODIUM CHLORIDE 0.9 % (FLUSH) 0.9 %
10 SYRINGE (ML) INJECTION EVERY 12 HOURS SCHEDULED
Status: DISCONTINUED | OUTPATIENT
Start: 2024-04-09 | End: 2024-04-09 | Stop reason: HOSPADM

## 2024-04-09 RX ORDER — ARFORMOTEROL TARTRATE 15 UG/2ML
15 SOLUTION RESPIRATORY (INHALATION)
Status: DISCONTINUED | OUTPATIENT
Start: 2024-04-09 | End: 2024-04-09 | Stop reason: HOSPADM

## 2024-04-09 RX ORDER — ACETAMINOPHEN 650 MG/1
650 SUPPOSITORY RECTAL EVERY 4 HOURS PRN
Status: DISCONTINUED | OUTPATIENT
Start: 2024-04-09 | End: 2024-04-09

## 2024-04-09 RX ORDER — NITROGLYCERIN 0.4 MG/1
0.4 TABLET SUBLINGUAL
Status: DISCONTINUED | OUTPATIENT
Start: 2024-04-09 | End: 2024-04-09 | Stop reason: HOSPADM

## 2024-04-09 RX ORDER — ACETAMINOPHEN 325 MG/1
650 TABLET ORAL EVERY 4 HOURS PRN
Status: DISCONTINUED | OUTPATIENT
Start: 2024-04-09 | End: 2024-04-09 | Stop reason: HOSPADM

## 2024-04-09 RX ORDER — SODIUM CHLORIDE FOR INHALATION 7 %
4 VIAL, NEBULIZER (ML) INHALATION EVERY 4 HOURS PRN
Start: 2024-04-09

## 2024-04-09 RX ORDER — AMOXICILLIN 250 MG
2 CAPSULE ORAL 2 TIMES DAILY PRN
Status: DISCONTINUED | OUTPATIENT
Start: 2024-04-09 | End: 2024-04-09 | Stop reason: HOSPADM

## 2024-04-09 RX ORDER — BISACODYL 10 MG
10 SUPPOSITORY, RECTAL RECTAL DAILY PRN
Status: DISCONTINUED | OUTPATIENT
Start: 2024-04-09 | End: 2024-04-09 | Stop reason: HOSPADM

## 2024-04-09 RX ORDER — GABAPENTIN 300 MG/1
300 CAPSULE ORAL 3 TIMES DAILY
Qty: 6 CAPSULE | Refills: 0 | Status: SHIPPED | OUTPATIENT
Start: 2024-04-09

## 2024-04-09 RX ORDER — ALBUTEROL SULFATE 2.5 MG/3ML
2.5 SOLUTION RESPIRATORY (INHALATION) EVERY 6 HOURS PRN
Status: DISCONTINUED | OUTPATIENT
Start: 2024-04-09 | End: 2024-04-09 | Stop reason: HOSPADM

## 2024-04-09 RX ORDER — SODIUM CHLORIDE 9 MG/ML
40 INJECTION, SOLUTION INTRAVENOUS AS NEEDED
Status: DISCONTINUED | OUTPATIENT
Start: 2024-04-09 | End: 2024-04-09 | Stop reason: HOSPADM

## 2024-04-09 RX ORDER — BISACODYL 5 MG/1
5 TABLET, DELAYED RELEASE ORAL DAILY PRN
Status: DISCONTINUED | OUTPATIENT
Start: 2024-04-09 | End: 2024-04-09 | Stop reason: HOSPADM

## 2024-04-09 RX ADMIN — FLUOXETINE HYDROCHLORIDE 20 MG: 20 CAPSULE ORAL at 13:57

## 2024-04-09 RX ADMIN — PANTOPRAZOLE SODIUM 40 MG: 40 TABLET, DELAYED RELEASE ORAL at 13:57

## 2024-04-09 RX ADMIN — GABAPENTIN 300 MG: 300 CAPSULE ORAL at 16:47

## 2024-04-09 RX ADMIN — Medication 10 ML: at 08:56

## 2024-04-09 RX ADMIN — ARFORMOTEROL TARTRATE 15 MCG: 15 SOLUTION RESPIRATORY (INHALATION) at 13:17

## 2024-04-09 RX ADMIN — SODIUM CHLORIDE 100 ML/HR: 9 INJECTION, SOLUTION INTRAVENOUS at 03:45

## 2024-04-09 NOTE — NURSING NOTE
Pt ambulated in the room with X1 with a walker, pt stated had shortness of air, room air saturating @ 96%

## 2024-04-09 NOTE — PLAN OF CARE
Goal Outcome Evaluation:    Problem: Pain Chronic (Persistent)  Goal: Acceptable Pain Control and Functional Ability  Outcome: Ongoing, Progressing  Intervention: Optimize Psychosocial Wellbeing  Recent Flowsheet Documentation  Taken 4/9/2024 1215 by Mai Arredondo RN  Supportive Measures: self-reflection promoted   Plan of Care Reviewed With: patient alerted and oriented, room air, ambulated with PT and nurse, no complains of dizziness or right hip pain.Call light within reach.

## 2024-04-09 NOTE — CASE MANAGEMENT/SOCIAL WORK
Continued Stay Note  [facility]     Patient Name: Tony Leonard  MRN: [id number]  Today's Date: 4/9/2024    Admit Date: 4/8/2024    Plan: Return to [facility]   Discharge Plan       Row Name 04/09/24 1210       Plan    Plan Return to [facility] term care    Plan Comments Pt admitted from [facility].  S/W Pavithra/ [facility] who states pt is from a long term care bed with a bed hold, and can return upon DC.  CCP spoke w/ pt who confirms plan to return to [facility].  Packet started and is in CCP office. ..........Sarita MINA/ CCP                   Discharge Codes    No documentation.                       Sarita Roth RN

## 2024-04-09 NOTE — ED NOTES
Nursing report ED to floor  Tony Leonard  83 y.o.  male    HPI :  HPI (Adult)  Stated Reason for Visit: fall striking head  c/o dizziness and chronic rt hip pain  History Obtained From: patient, EMS    Chief Complaint  Chief Complaint   Patient presents with    Fall    Dizziness       Admitting doctor:   Pop Hauser MD    Admitting diagnosis:   The primary encounter diagnosis was Dizziness. Diagnoses of Closed head injury, initial encounter, Strain of neck muscle, initial encounter, Fall, initial encounter, and Elevated troponin were also pertinent to this visit.    Code status:   Current Code Status       Date Active Code Status Order ID Comments User Context       4/9/2024 0242 CPR (Attempt to Resuscitate) [id number]  Caren Mead APRN ED        Question Answer    Code Status (Patient has no pulse and is not breathing) CPR (Attempt to Resuscitate)    Medical Interventions (Patient has pulse or is breathing) Full Support                    Allergies:   Daliresp [roflumilast], Latex, and Sulfa antibiotics    Isolation:   No active isolations    Intake and Output  No intake or output data in the 24 hours ending 04/09/24 0257    Weight:       04/08/24 2049   Weight: 85.6 kg (188 lb 11.4 oz)       Most recent vitals:   Vitals:    04/09/24 0136 04/09/24 0154 04/09/24 0201 04/09/24 0210   BP: 125/64  146/76    Pulse: 73 72 72 73   Resp:       Temp:       SpO2: 97% 97% 97% 97%   Weight:       Height:           Active LDAs/IV Access:   Lines, Drains & Airways       Active LDAs       Name Placement date Placement time Site Days    Peripheral IV 04/08/24 2118 Anterior;Right Hand 04/08/24 2118  Hand  less than 1                    Labs (abnormal labs have a star):   Labs Reviewed   COMPREHENSIVE METABOLIC PANEL - Abnormal; Notable for the following components:       Result Value    Glucose 110 (*)     Chloride 110 (*)     Calcium 7.8 (*)     Total Protein 5.4 (*)     All other components within normal limits     Narrative:     GFR Normal >60  Chronic Kidney Disease <60  Kidney Failure <15    The GFR formula is only valid for adults with stable renal function between ages 18 and 70.   SINGLE HS TROPONIN T - Abnormal; Notable for the following components:    HS Troponin T 40 (*)     All other components within normal limits    Narrative:     High Sensitive Troponin T Reference Range:  <14.0 ng/L- Negative Female for AMI  <22.0 ng/L- Negative Male for AMI  >=14 - Abnormal Female indicating possible myocardial injury.  >=22 - Abnormal Male indicating possible myocardial injury.   Clinicians would have to utilize clinical acumen, EKG, Troponin, and serial changes to determine if it is an Acute Myocardial Infarction or myocardial injury due to an underlying chronic condition.        CBC WITH AUTO DIFFERENTIAL - Abnormal; Notable for the following components:    RBC 3.87 (*)     Hemoglobin 10.3 (*)     Hematocrit 32.9 (*)     MCHC 31.3 (*)     RDW 16.9 (*)     Lymphocyte % 17.3 (*)     Eosinophil % 0.0 (*)     Immature Grans % 1.4 (*)     Immature Grans, Absolute 0.10 (*)     All other components within normal limits   SINGLE HS TROPONIN T - Abnormal; Notable for the following components:    HS Troponin T 46 (*)     All other components within normal limits    Narrative:     High Sensitive Troponin T Reference Range:  <14.0 ng/L- Negative Female for AMI  <22.0 ng/L- Negative Male for AMI  >=14 - Abnormal Female indicating possible myocardial injury.  >=22 - Abnormal Male indicating possible myocardial injury.   Clinicians would have to utilize clinical acumen, EKG, Troponin, and serial changes to determine if it is an Acute Myocardial Infarction or myocardial injury due to an underlying chronic condition.        MAGNESIUM - Normal   URINALYSIS W/ MICROSCOPIC IF INDICATED (NO CULTURE) - Normal    Narrative:     Urine microscopic not indicated.   CALCIUM, IONIZED - Normal   POCT GLUCOSE FINGERSTICK - Normal   RAINBOW DRAW     Narrative:     The following orders were created for panel order [facility] Draw.  Procedure                               Abnormality         Status                     ---------                               -----------         ------                     Green Top (Gel)[[id number]]                                  Final result               Lavender Top[[id number]]                                     Final result               Gold Top - SST[[id number]]                                                              Light Blue Top[[id number]]                                   Final result                 Please view results for these tests on the individual orders.   BASIC METABOLIC PANEL   CBC (NO DIFF)   TROPONIN   POCT GLUCOSE FINGERSTICK   CBC AND DIFFERENTIAL    Narrative:     The following orders were created for panel order CBC & Differential.  Procedure                               Abnormality         Status                     ---------                               -----------         ------                     CBC Auto Differential[[id number]]        Abnormal            Final result                 Please view results for these tests on the individual orders.   GREEN TOP   LAVENDER TOP   LIGHT BLUE TOP   GOLD TOP - SST       EKG:   ECG 12 Lead ED Triage Standing Order; Weak / Dizzy / AMS   Preliminary Result   HEART RATE= 86  bpm   RR Interval= 698  ms   WA Interval= 166  ms   P Horizontal Axis= 28  deg   P Front Axis= 14  deg   QRSD Interval= 77  ms   QT Interval= 357  ms   QTcB= 427  ms   QRS Axis= -13  deg   T Wave Axis= 22  deg   - OTHERWISE NORMAL ECG -   Sinus rhythm   Abnormal R-wave progression, early transition   Electronically Signed By:    Date and Time of Study: 2024-04-08 21:07:18          Meds given in ED:   Medications   sodium chloride 0.9 % flush 10 mL (has no administration in time range)   sodium chloride 0.9 % flush 10 mL (has no administration in time range)   sodium chloride 0.9 % flush 10  mL (has no administration in time range)   sodium chloride 0.9 % infusion 40 mL (has no administration in time range)   nitroglycerin (NITROSTAT) SL tablet 0.4 mg (has no administration in time range)   sodium chloride 0.9 % infusion (has no administration in time range)   acetaminophen (TYLENOL) tablet 650 mg (has no administration in time range)     Or   acetaminophen (TYLENOL) 160 MG/5ML oral solution 650 mg (has no administration in time range)     Or   acetaminophen (TYLENOL) suppository 650 mg (has no administration in time range)   sennosides-docusate (PERICOLACE) 8.6-50 MG per tablet 2 tablet (has no administration in time range)     And   polyethylene glycol (MIRALAX) packet 17 g (has no administration in time range)     And   bisacodyl (DULCOLAX) EC tablet 5 mg (has no administration in time range)     And   bisacodyl (DULCOLAX) suppository 10 mg (has no administration in time range)   calcium carbonate (TUMS) chewable tablet 500 mg (200 mg elemental) (has no administration in time range)       Imaging results:  CT Pelvis Without Contrast    Result Date: 4/8/2024  1. Old healed bilateral superior and inferior pubic rami fractures. 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 Note that the tip of the femoral stem is not included in the field-of-view and if there are symptoms at this location, x-ray is recommended to evaluate the tip of the femoral stem. 2. 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 3. Colonic diverticulosis without evidence for diverticulitis.  This report was finalized on 4/8/2024 11:03 PM by Dr. Lexa Pina M.D on Workstation: [id number]      CT Head Without Contrast    Result Date: 4/8/2024  Electronically signed by Jason Whitaker MD on 04-08-24 at 2251    CT Cervical Spine Without Contrast    Result Date: 4/8/2024  Electronically signed by Jason Whitaker  MD on 04-08-24 at 2250    XR Chest 1 View    Result Date: 4/8/2024  Blunting of the costophrenic angles suspected be due to small pleural effusions. There is no further evidence for active disease in the chest.  This report was finalized on 4/8/2024 10:33 PM by Dr. Lexa Pina M.D on Workstation: [id number]       Ambulatory status:   - up with assist     Social issues:   Social History     Socioeconomic History    Marital status: Single   Tobacco Use    Smoking status: Never    Smokeless tobacco: Never   Vaping Use    Vaping status: Never Used   Substance and Sexual Activity    Alcohol use: No     Comment: red wine occassional    Drug use: No    Sexual activity: Defer       Peripheral Neurovascular  Peripheral Neurovascular (Adult)  Peripheral Neurovascular WDL: WDL    Neuro Cognitive  Neuro Cognitive (Adult)  Cognitive/Neuro/Behavioral WDL: WDL  Pupils  Pupil PERRLA: yes    Learning  Learning Assessment (Adult)  Learning Readiness and Ability: no barriers identified  Education Provided  Person Taught: patient  Teaching Method: verbal instruction  Teaching Focus: symptom/problem overview  Education Outcome Evaluation: eager to learn    Respiratory  Respiratory WDL  Respiratory WDL: .WDL except, rhythm/pattern, cough  Cough Frequency: frequent  Cough Type: good    Abdominal Pain       Pain Assessments  Pain (Adult)  (0-10) Pain Rating: Rest: 4  Pain Location: hip  Pain Side/Orientation: right    NIH Stroke Scale       Jaspal Fairchild RN  04/09/24 02:57 EDT

## 2024-04-09 NOTE — DISCHARGE PLACEMENT REQUEST
"Tony Casey (83 y.o. Male)       Date of Birth   1940    Social Security Number       Address   [address] [place] [address]    Home Phone   [phone]    MRN   [id number]       Adventism   [employer]    Marital Status   Single                            Admission Date   4/8/24    Admission Type   Emergency    Admitting Provider   Joe Bryant MD    Attending Provider   Joe Bryant MD    Department, Room/Bed   [address], [address]/1       Discharge Date       Discharge Disposition       Discharge Destination                                 Attending Provider: Joe Bryant MD    Allergies: Daliresp [Roflumilast], Latex, Sulfa Antibiotics    Isolation: None   Infection: None   Code Status: CPR    Ht: 180.3 cm (71\")   Wt: 86.1 kg (189 lb 13.1 oz)    Admission Cmt: None   Principal Problem: Dizziness [R42]                   Active Insurance as of 4/8/2024       Primary Coverage       Payor Plan Insurance Group Employer/Plan Group    MEDICARE MEDICARE A & B        Payor Plan Address Payor Plan Phone Number Payor Plan Fax Number Effective Dates    [address] [phone]  5/1/2003 - None Entered    [facility] [id number]         Subscriber Name Subscriber Birth Date Member ID       TONY CASEY 1940 [id number]               Secondary Coverage       Payor Plan Insurance Group Employer/Plan Group     FOR LIFE  FOR LIFE  SUP         Payor Plan Address Payor Plan Phone Number Payor Plan Fax Number Effective Dates    [address] [phone]  3/10/2016 - None Entered    [facility] [address]         Subscriber Name Subscriber Birth Date Member ID       TONY CASEY 1940 [id number]                     Emergency Contacts        (Rel.) Home Phone Work Phone Mobile Phone    JOJO CASEYWILLIAM (Son) [phone] -- [phone]    Frederick Casey (Son) -- -- [phone]    Ricardo Casey (Son) [phone] -- " [phone]

## 2024-04-09 NOTE — THERAPY EVALUATION
Patient Name: Tony Leonard  : 1940    MRN: [id number]                              Today's Date: 2024       Admit Date: 2024    Visit Dx:     ICD-10-CM ICD-9-CM   1. Dizziness  R42 780.4   2. Closed head injury, initial encounter  S09.90XA 959.01   3. Strain of neck muscle, initial encounter  S16.1XXA 847.0   4. Fall, initial encounter  W19.XXXA E888.9   5. Elevated troponin  R79.89 790.6   6. Chronic low back pain with sciatica, sciatica laterality unspecified, unspecified back pain laterality  M54.40 724.2    G89.29 724.3     338.29     Patient Active Problem List   Diagnosis    Thrush, oral    ADD (attention deficit disorder)    Hemorrhoids    Bronchiectasis with acute lower respiratory infection    Diverticul disease small and large intestine, no perforati or abscess    Benign non-nodular prostatic hyperplasia without lower urinary tract symptoms    Gastroesophageal reflux disease    Malaise and fatigue    Environmental allergies    Hemoptysis    Dyslipidemia    Primary osteoarthritis involving multiple joints    Pneumonia of right lower lobe due to infectious organism    Abnormal EKG    COPD (chronic obstructive pulmonary disease)    Mild malnutrition    Acute on chronic respiratory failure with hypoxia    Fracture, intertrochanteric, right femur, closed, initial encounter    Chronic diastolic CHF (congestive heart failure)    Hematoma of frontal scalp    Postoperative anemia due to acute blood loss    Urinary retention    Hemorrhagic disorder due to circulating anticoagulants    History of fracture of right hip    Closed fracture of right hip with routine healing    Chronic low back pain with sciatica    Change in bowel function    Rectal bleeding    Prostate cancer    On home oxygen therapy    Acute viral bronchitis    Peripheral neuropathy    Plantar fasciitis    HTN (hypertension)    COPD exacerbation    Bilateral lower extremity edema    Microscopic hematuria    Acute midline low back  pain with right-sided sciatica    Spinal stenosis, lumbar region with neurogenic claudication    Compression deformity of vertebra    Rectal bleed    Esophagitis    Angiectasia    Hiatal hernia    Overweight (BMI 25.0-29.9)    Leukocytosis    Bilateral hip pain    Elevated troponin level not due myocardial infarction    Nocturnal hypoxia    Pneumonia of right upper lobe due to infectious organism    NSTEMI (non-ST elevated myocardial infarction)    Chronic respiratory failure with hypoxia    Paroxysmal atrial fibrillation    Acute exacerbation of chronic obstructive pulmonary disease (COPD)    Anemia    Non-compliant patient    Hypokalemia    Spondylolisthesis of lumbar region    Elevated troponin    Rhabdomyolysis    Multiple contusions    Fall    Dizziness    Contusion of hip     Past Medical History:   Diagnosis Date    ADHD (attention deficit hyperactivity disorder)     Bronchiectasis     CHF (congestive heart failure)     Colon polyp     COPD (chronic obstructive pulmonary disease)     Diverticulosis     Hard of hearing     On home oxygen therapy     2 LITERS    Pneumonia     Prostate cancer 04/22/2019    Spinal stenosis, lumbar region with neurogenic claudication 08/02/2022     Past Surgical History:   Procedure Laterality Date    BRONCHOSCOPY N/A 4/30/2016    Procedure: BRONCHOSCOPY with BAL of right lower lobe and left  lower lobe.  ;  Surgeon: Nando Diaz MD;  Location: [facility] ENDOSCOPY;  Service:     BRONCHOSCOPY N/A 4/28/2017    Procedure: BRONCHOSCOPY;  Surgeon: Katharina Salter MD;  Location: [facility] ENDOSCOPY;  Service:     BRONCHOSCOPY Bilateral 6/29/2017    Procedure: BRONCHOSCOPY WITH WASHINGS;  Surgeon: Katharina Salter MD;  Location: [facility] ENDOSCOPY;  Service:     BRONCHOSCOPY N/A 1/22/2018    Procedure: BRONCHOSCOPY AT BEDSIDE with BAL;  Surgeon: Katharina Salter MD;  Location: [facility] ENDOSCOPY;  Service:     BRONCHOSCOPY N/A 1/30/2018    Procedure: BRONCHOSCOPY with BAL and washing;  Surgeon:  Shreyas Wild MD;  Location: [facility] ENDOSCOPY;  Service:     BRONCHOSCOPY Bilateral 4/24/2018    Procedure: BRONCHOSCOPY WITH WASHING;  Surgeon: Katharina Salter MD;  Location[facility] ENDOSCOPY;  Service: Pulmonary    BRONCHOSCOPY N/A 12/28/2019    Procedure: BRONCHOSCOPY with bilateral washing;  Surgeon: Katharina Salter MD;  Location: [facility] ENDOSCOPY;  Service: Pulmonary    BRONCHOSCOPY Bilateral 1/4/2023    Procedure: BRONCHOSCOPY with lavage;  Surgeon: Katharina Salter MD;  Location: [facility] ENDOSCOPY;  Service: Pulmonary;  Laterality: Bilateral;  mucus plugging    CARDIAC CATHETERIZATION N/A 1/29/2023    Procedure: Left Heart Cath;  Surgeon: Johnnie Ang MD;  Location: [facility] CATH INVASIVE LOCATION;  Service: Cardiology;  Laterality: N/A;    CARDIAC CATHETERIZATION N/A 1/29/2023    Procedure: Coronary angiography;  Surgeon: Johnnie Ang MD;  Location: [facility] CATH INVASIVE LOCATION;  Service: Cardiology;  Laterality: N/A;    COLONOSCOPY  05/17/2013    eh, ih, tort, sig tics    COLONOSCOPY N/A 5/10/2019    Non-thrombosed external hemorrhoids found on perianal exam, Diverticulosis, Tortuous colon, One 5 mm polyp in the mid ascending colon, IH. Path: Tubular adenoma.     COLONOSCOPY N/A 9/24/2022    Procedure: COLONOSCOPY to cecum and TI with argon plasma coagulation.;  Surgeon: Bruce Black MD;  Location: [facility] ENDOSCOPY;  Service: Gastroenterology;  Laterality: N/A;  pre- GI bleeding  post- radiation proctitis, diverticulosis    ENDOSCOPY  03/19/2015    z line irreg, hh    ENDOSCOPY N/A 9/24/2022    Procedure: ESOPHAGOGASTRODUODENOSCOPY;  Surgeon: Bruce Black MD;  Location: [facility] ENDOSCOPY;  Service: Gastroenterology;  Laterality: N/A;  pre- GI bleeding  post- esophagitis, hiatal hernia    HIP OPEN REDUCTION Right 1/17/2018    Procedure: HIP OPEN REDUCTION INTERNAL FIXATION WITH DYNAMIC HIP SCREW;  Surgeon: Les Black MD;  Location: [facility] MAIN OR;  Service:     SPINE SURGERY       TONSILLECTOMY      TOTAL HIP ARTHROPLASTY REVISION Right 9/23/2019    Procedure: HIP  REVISION RIGHT;  Surgeon: Isauro Warner MD;  Location: [facility] MAIN OR;  Service: Orthopedics      General Information       [facility] Name 04/09/24 1651          Physical Therapy Time and Intention    Document Type evaluation;discharge evaluation/summary  -     Mode of Treatment individual therapy;physical therapy  -       Row Name 04/09/24 1651          General Information    Patient Profile Reviewed yes  -     Prior Level of Function independent:;gait;transfer;bed mobility  -     Existing Precautions/Restrictions fall  -     Barriers to Rehab none identified  -       Row Name 04/09/24 1651          Living Environment    People in Home facility resident  LTC  -       Row Name 04/09/24 1651          Home Main Entrance    Number of Stairs, Main Entrance none  -[facility] Name 04/09/24 1651          Stairs Within Home, Primary    Number of Stairs, Within Home, Primary none  -[facility] Name 04/09/24 1651          Cognition    Orientation Status (Cognition) oriented x 4  -[facility] Name 04/09/24 1651          Safety Issues, Functional Mobility    Impairments Affecting Function (Mobility) balance;endurance/activity tolerance;strength  -               User Key  (r) = Recorded By, (t) = Taken By, (c) = Cosigned By      Initials Name Provider Type     Diana Morales PT Physical Therapist                   Mobility       Row Name 04/09/24 1651          Bed Mobility    Bed Mobility supine-sit  -     Supine-Sit [place] (Bed Mobility) standby assist;verbal cues  -     Assistive Device (Bed Mobility) head of bed elevated;bed rails  -       Row Name 04/09/24 1651          Sit-Stand Transfer    Sit-Stand [place] (Transfers) verbal cues;contact guard  -     Assistive Device (Sit-Stand Transfers) walker, front-wheeled  -       Row Name 04/09/24 1651          Gait/Stairs (Locomotion)    [place]  Level (Gait) verbal cues;contact guard  -     Assistive Device (Gait) walker, front-wheeled  -     Distance in Feet (Gait) 80  -     Deviations/Abnormal Patterns (Gait) base of support, wide;norm decreased;gait speed decreased;stride length decreased  -     Bilateral Gait Deviations forward flexed posture  -     Comment, (Gait/Stairs) Ambulated on RA, c/o mild SOA but satting % throughout mobility, reports he normally breaths heavy with exertion  -               User Key  (r) = Recorded By, (t) = Taken By, (c) = Cosigned By      Initials Name Provider Type     Diana Morales, PT Physical Therapist                   Obj/Interventions       [facility] Name 04/09/24 1653          Range of Motion Comprehensive    General Range of Motion bilateral lower extremity ROM WFL  -[facility] Name 04/09/24 1653          Strength Comprehensive (MMT)    General Manual Muscle Testing (MMT) Assessment lower extremity strength deficits identified  -     Comment, General Manual Muscle Testing (MMT) Assessment Generalized weakness, BLE grossly 4/5  -[facility] Name 04/09/24 1653          Balance    Balance Assessment sitting static balance;sitting dynamic balance;standing static balance;standing dynamic balance  -     Static Sitting Balance standby assist  -     Dynamic Sitting Balance standby assist  -     Position, Sitting Balance unsupported;sitting edge of bed  -     Static Standing Balance contact guard;verbal cues  -     Dynamic Standing Balance contact guard;verbal cues  -     Position/Device Used, Standing Balance supported;walker, front-wheeled  -     Balance Interventions sitting;standing;sit to stand;supported;static;dynamic  -[facility] Name 04/09/24 1653          Sensory Assessment (Somatosensory)    Sensory Assessment (Somatosensory) LE sensation intact  -               User Key  (r) = Recorded By, (t) = Taken By, (c) = Cosigned By      Initials Name Provider Type     Andrew  Diana MOORE PT Physical Therapist                   Goals/Plan    No documentation.                  Clinical Impression       Row Name 04/09/24 1654          Pain    Pretreatment Pain Rating 0/10 - no pain  -     Posttreatment Pain Rating 0/10 - no pain  -       Row Name 04/09/24 1654          Plan of Care Review    Plan of Care Reviewed With patient  -     Outcome Evaluation Pt is an 82 yo M admitted from LTC after a fall - reports he leaned forward to pick something up off the floor, felt dizzy, and fell. Pt reports no other hx of feeling dizzy or recent falls, states he is independent with a rwx. Pt presents to PT with impaired strength, endurance, and balance, though likely is close to BL. Pt transferred to EOB with CGA, STS with CGA, and ambulated 80ft with rwx and SBA-CGA. Pt ambulated on RA, c/o mild SOA, though satting % throughout mobility. Pt cued for upright posture and walker placement, gait distance limited by fatigue. Pt returned to room and agreeable to sitting [facility]. Pt reports he typically breathes heavy with exertion and pt denied any dizziness with mobility today. Pt left sitting [facility] with needs met, safe to return to LTC facility, no further acute PT needs. PT will sign-off.  -[facility] Name 04/09/24 1654          Therapy Assessment/Plan (PT)    Criteria for Skilled Interventions Met (PT) no;does not meet criteria for skilled intervention  -     Therapy Frequency (PT) evaluation only  -[facility] Name 04/09/24 1654          Vital Signs    O2 Delivery Pre Treatment room air  -     O2 Delivery Intra Treatment room air  -     O2 Delivery Post Treatment room air  -[facility] Name 04/09/24 1654          Positioning and Restraints    Pre-Treatment Position in bed  -     Post Treatment Position chair  -     In Chair notified nsg;reclined;call light within reach;encouraged to call for assist;exit alarm on  -               User Key  (r) = Recorded By, (t) = Taken By, (c) =  Cosigned By      Initials Name Provider Type     Diana Morales PT Physical Therapist                   Outcome Measures       Row Name 04/09/24 1657          How much help from another person do you currently need...    Turning from your back to your side while in flat bed without using bedrails? 4  -BH     Moving from lying on back to sitting on the side of a flat bed without bedrails? 3  -BH     Moving to and from a bed to a chair (including a wheelchair)? 3  -BH     Standing up from a chair using your arms (e.g., wheelchair, bedside chair)? 3  -BH     Climbing 3-5 steps with a railing? 3  -BH     To walk in hospital room? 3  -BH     AM-PAC 6 Clicks Score (PT) 19  -     Highest Level of Mobility Goal 6 --> Walk 10 steps or more  -       Row Name 04/09/24 1657          Functional Assessment    Outcome Measure Options AM-PAC 6 Clicks Basic Mobility (PT)  -               User Key  (r) = Recorded By, (t) = Taken By, (c) = Cosigned By      Initials Name Provider Type     Diana Morales PT Physical Therapist                                 Physical Therapy Education       Title: PT OT SLP Therapies (Done)       Topic: Physical Therapy (Done)       Point: Mobility training (Done)       Learning Progress Summary             Patient Acceptance, E,TB,D, VU by  at 4/9/2024 1658                         Point: Home exercise program (Done)       Learning Progress Summary             Patient Acceptance, E,TB,D, VU by  at 4/9/2024 1658                         Point: Body mechanics (Done)       Learning Progress Summary             Patient Acceptance, E,TB,D, VU by  at 4/9/2024 1658                         Point: Precautions (Done)       Learning Progress Summary             Patient Acceptance, E,TB,D, VU by  at 4/9/2024 1658                                         User Key       Initials Effective Dates Name Provider Type [facility] 04/08/22 -  Diana Morales PT Physical Therapist PT                   PT Recommendation and Plan     Plan of Care Reviewed With: patient  Outcome Evaluation: Pt is an 82 yo M admitted from LTC after a fall - reports he leaned forward to pick something up off the floor, felt dizzy, and fell. Pt reports no other hx of feeling dizzy or recent falls, states he is independent with a rwx. Pt presents to PT with impaired strength, endurance, and balance, though likely is close to BL. Pt transferred to EOB with CGA, STS with CGA, and ambulated 80ft with rwx and SBA-CGA. Pt ambulated on RA, c/o mild SOA, though satting % throughout mobility. Pt cued for upright posture and walker placement, gait distance limited by fatigue. Pt returned to room and agreeable to sitting [facility]. Pt reports he typically breathes heavy with exertion and pt denied any dizziness with mobility today. Pt left sitting [facility] with needs met, safe to return to LTC facility, no further acute PT needs. PT will sign-off.     Time Calculation:   PT Evaluation Complexity  History, PT Evaluation Complexity: 1-2 personal factors and/or comorbidities  Examination of Body Systems (PT Eval Complexity): total of 3 or more elements  Clinical Presentation (PT Evaluation Complexity): stable  Clinical Decision Making (PT Evaluation Complexity): low complexity  Overall Complexity (PT Evaluation Complexity): low complexity     PT Charges       Row Name 04/09/24 1658             Time Calculation    Start Time 1626  -      Stop Time 1640  -      Time Calculation (min) 14 min  -      PT Received On 04/09/24  -         Time Calculation- PT    Total Timed Code Minutes- PT 10 minute(s)  -         Timed Charges    17237 - Gait Training Minutes  8  -      17114 - PT Therapeutic Activity Minutes 2  -BH         Total Minutes    Timed Charges Total Minutes 10  -       Total Minutes 10  -BH                User Key  (r) = Recorded By, (t) = Taken By, (c) = Cosigned By      Initials Name Provider Type    BH Diana Morales, PT  Physical Therapist                  Therapy Charges for Today       Code Description Service Date Service Provider Modifiers Qty    [id number]  GAIT TRAINING EA 15 MIN 4/9/2024 Diana Morales, PT GP 1    [id number] HC PT EVAL LOW COMPLEXITY 2 4/9/2024 Diana Morales, PT GP 1            PT G-Codes  Outcome Measure Options: AM-PAC 6 Clicks Basic Mobility (PT)  AM-PAC 6 Clicks Score (PT): 19  PT Discharge Summary  Anticipated Discharge Disposition (PT): extended care facility    Diana Morales, PT  4/9/2024

## 2024-04-09 NOTE — ED TRIAGE NOTES
To ER via EMS from [facility].  Pt fell approx 1630 this afternoon with c/o dizziness.  Pt did strike back of head with small hematoma present.  Pt still c/o dizziness.  Pt also c/o chronic rt hip pain.  No LOC.  No blood thinners.     Pt on chronic O2 2l/nc.

## 2024-04-09 NOTE — PLAN OF CARE
Goal Outcome Evaluation:  Plan of Care Reviewed With: patient           Outcome Evaluation: Pt is an 84 yo M admitted from LTC after a fall - reports he leaned forward to pick something up off the floor, felt dizzy, and fell. Pt reports no other hx of feeling dizzy or recent falls, states he is independent with a rwx. Pt presents to PT with impaired strength, endurance, and balance, though likely is close to BL. Pt transferred to EOB with CGA, STS with CGA, and ambulated 80ft with rwx and SBA-CGA. Pt ambulated on RA, c/o mild SOA, though satting % throughout mobility. Pt cued for upright posture and walker placement, gait distance limited by fatigue. Pt returned to room and agreeable to sitting [facility]. Pt reports he typically breathes heavy with exertion and pt denied any dizziness with mobility today. Pt left sitting [facility] with needs met, safe to return to LTC facility, no further acute PT needs. PT will sign-off.      Anticipated Discharge Disposition (PT): extended care facility

## 2024-04-09 NOTE — ED PROVIDER NOTES
MD ATTESTATION NOTE      Brief HPI: 83-year-old male who presents to the emergency room via EMS from the nursing home after a fall this afternoon striking the back of his head.  The patient has a small hematoma there.  The patient denies loss of consciousness or blood thinners.  The patient also has chronic right hip pain and is on chronic oxygen.  The patient states he felt dizzy when he fell.    General : 83-year-old patient is awake alert and oriented  HEENT: NCAT: There is no hematoma or C-spine tenderness  CV: Heart is regular with no murmurs  Respiratory: CTA bilaterally  Abd: Soft and nontender  Ext: The patient does have mild pelvic tenderness but no shortening or rotation of his legs  Skin: No rash  Neuro: Awake with a nonfocal neuro exam  Psych: Normal mood and affect      Plan: We will check labs, EKG, CT head, C-spine and pelvis for further evaluation    EKG reviewed by me shows a normal sinus rhythm with nonspecific changes no acute ischemia and similar to previous in February of this year    Interpreted Contemporaneously by me.  Independently viewed by me  My independent interpretation the patient's chest x-ray is poor inspiration with mild vascular congestion    Patient CT head, C-spine and pelvis is negative acute.  His labs show a initial elevated troponin at 40 but he is not having chest pain and his EKG is nonischemic.  Will repeat his troponin and ambulate the patient to evaluate his dizziness.  Currently he is hemodynamically stable.  We will admit the patient to the hospitalist for further evaluation and care.  Of note is that his troponin went from 40-46.    SHARED VISIT: This visit was performed by BOTH a physician and an APC. The substantive portion of the medical decision making was performed by this attesting physician who made or approved the management plan and takes responsibility for patient management. All studies in the APC note (if performed) were independently interpreted by me.          Lexa Montesinos MD  04/10/24 6182

## 2024-04-09 NOTE — ED PROVIDER NOTES
EMERGENCY DEPARTMENT ENCOUNTER  Room Number:  01/01  PCP: Alfonso Goldstein MD  Independent Historians: Patient      HPI:  Chief Complaint: had concerns including Fall and Dizziness.     A complete HPI/ROS/PMH/PSH/SH/FH are unobtainable due to: None    Chronic or social conditions impacting patient care (Social Determinants of Health): None      Context: Tony Leonard is a 83 y.o. male who presents to the emergency department with complaints of a head injury.  Patient reports earlier today he felt dizzy and had a fall.  He states he hit the back of his head.  No LOC.  He is not currently taking any blood thinning medications.  Patient informs he has been experiencing hip pain since the fall, states he always has some hip pain however this pain is different.  Patient denies other injuries, other arthralgias, fever, chills,  lightheadedness, dizziness, numbness, tingling, unilateral extremity weakness, syncope, shortness of breath, chest pain, abdominal pain, or other complaints.    Review of prior external notes (non-ED) -and- Review of prior external test results outside of this encounter:   February 10 through February 14, 2024.  Patient was admitted at [facility].  Admitted for exacerbation of COPD, paroxysmal A-fib.      PAST MEDICAL HISTORY  Active Ambulatory Problems     Diagnosis Date Noted    Thrush, oral 03/11/2016    ADD (attention deficit disorder) 03/11/2016    Hemorrhoids 03/11/2016    Bronchiectasis with acute lower respiratory infection 03/11/2016    Diverticul disease small and large intestine, no perforati or abscess 03/11/2016    Benign non-nodular prostatic hyperplasia without lower urinary tract symptoms 03/11/2016    Gastroesophageal reflux disease 03/11/2016    Malaise and fatigue 03/11/2016    Environmental allergies 04/25/2016    Hemoptysis 04/27/2016    Dyslipidemia 05/11/2016    Primary osteoarthritis involving multiple joints 05/11/2016    Pneumonia of right lower  lobe due to infectious organism 10/03/2016    Abnormal EKG 10/04/2016    COPD (chronic obstructive pulmonary disease) 10/04/2016    Mild malnutrition 03/28/2017    Acute on chronic respiratory failure with hypoxia 04/27/2017    Fracture, intertrochanteric, right femur, closed, initial encounter 01/16/2018    Chronic diastolic CHF (congestive heart failure) 01/16/2018    Hematoma of frontal scalp 01/16/2018    Postoperative anemia due to acute blood loss 01/19/2018    Urinary retention 01/25/2018    Hemorrhagic disorder due to circulating anticoagulants 01/29/2018    History of fracture of right hip 02/22/2018    Closed fracture of right hip with routine healing 02/28/2018    Chronic low back pain with sciatica 06/21/2018    Change in bowel function 04/18/2019    Rectal bleeding 04/18/2019    Prostate cancer 04/22/2019    On home oxygen therapy 09/24/2019    Acute viral bronchitis 12/29/2019    Peripheral neuropathy 07/01/2021    Plantar fasciitis 09/08/2021    HTN (hypertension) 05/06/2022    COPD exacerbation 05/07/2022    Bilateral lower extremity edema 05/07/2022    Microscopic hematuria 05/08/2022    Acute midline low back pain with right-sided sciatica 08/01/2022    Spinal stenosis, lumbar region with neurogenic claudication 08/02/2022    Compression deformity of vertebra 08/02/2022    Rectal bleed 09/23/2022    Esophagitis 09/24/2022    Angiectasia 09/27/2022    Hiatal hernia 09/27/2022    Overweight (BMI 25.0-29.9) 11/14/2022    Leukocytosis 11/15/2022    Bilateral hip pain 11/21/2022    Elevated troponin level not due myocardial infarction 12/10/2022    Nocturnal hypoxia 12/10/2022    Pneumonia of right upper lobe due to infectious organism 12/29/2022    NSTEMI (non-ST elevated myocardial infarction) 01/29/2023    Chronic respiratory failure with hypoxia 01/29/2023    Paroxysmal atrial fibrillation 02/17/2023    Acute exacerbation of chronic obstructive pulmonary disease (COPD) 05/10/2023    Anemia  05/10/2023    Non-compliant patient 05/11/2023    Hypokalemia 10/01/2020    Spondylolisthesis of lumbar region 08/14/2018    Elevated troponin 12/22/2023    Rhabdomyolysis 12/26/2023    Multiple contusions 12/26/2023    Fall 12/26/2023     Resolved Ambulatory Problems     Diagnosis Date Noted    Pneumonia of both lower lobes due to infectious organism 03/11/2016    Pneumonia of right upper lobe due to infectious organism 04/22/2016    COPD exacerbation 04/25/2016    GERD (gastroesophageal reflux disease) 04/25/2016    Chronic respiratory failure with hypoxia 10/04/2016    Bacterial lobar pneumonia 12/27/2016    Pneumonia of left lower lobe due to infectious organism 03/26/2017    Bronchitis 06/01/2017    COPD exacerbation 06/17/2017    Leukocytosis 01/16/2018    Right upper lobe pneumonia 01/20/2018    Mucus plugging of bronchi 01/16/2018    COPD exacerbation 04/16/2018    Degenerative joint disease (DJD) of hip 09/23/2019    Bronchiectasis with (acute) exacerbation 12/28/2019    Septic shock 11/26/2022    Acute saddle pulmonary embolism without acute cor pulmonale  - 12/11/2022 12/11/2022     Past Medical History:   Diagnosis Date    ADHD (attention deficit hyperactivity disorder)     Bronchiectasis     CHF (congestive heart failure)     Colon polyp     Diverticulosis     Hard of hearing     Pneumonia          PAST SURGICAL HISTORY  Past Surgical History:   Procedure Laterality Date    BRONCHOSCOPY N/A 4/30/2016    Procedure: BRONCHOSCOPY with BAL of right lower lobe and left  lower lobe.  ;  Surgeon: Nando iDaz MD;  Location: [facility] ENDOSCOPY;  Service:     BRONCHOSCOPY N/A 4/28/2017    Procedure: BRONCHOSCOPY;  Surgeon: Katharina Salter MD;  Location: [facility] ENDOSCOPY;  Service:     BRONCHOSCOPY Bilateral 6/29/2017    Procedure: BRONCHOSCOPY WITH WASHINGS;  Surgeon: Katharina Salter MD;  Location: [facility] ENDOSCOPY;  Service:     BRONCHOSCOPY N/A 1/22/2018    Procedure: BRONCHOSCOPY AT BEDSIDE with BAL;   Surgeon: Katharina Salter MD;  Location: [facility] ENDOSCOPY;  Service:     BRONCHOSCOPY N/A 1/30/2018    Procedure: BRONCHOSCOPY with BAL and washing;  Surgeon: Shreyas Wild MD;  Location: [facility] ENDOSCOPY;  Service:     BRONCHOSCOPY Bilateral 4/24/2018    Procedure: BRONCHOSCOPY WITH WASHING;  Surgeon: Katharina Salter MD;  Location: [facility] ENDOSCOPY;  Service: Pulmonary    BRONCHOSCOPY N/A 12/28/2019    Procedure: BRONCHOSCOPY with bilateral washing;  Surgeon: Katharina Salter MD;  Location: [facility] ENDOSCOPY;  Service: Pulmonary    BRONCHOSCOPY Bilateral 1/4/2023    Procedure: BRONCHOSCOPY with lavage;  Surgeon: Katharina Salter MD;  Location: [facility] ENDOSCOPY;  Service: Pulmonary;  Laterality: Bilateral;  mucus plugging    CARDIAC CATHETERIZATION N/A 1/29/2023    Procedure: Left Heart Cath;  Surgeon: Johnnie Ang MD;  Location: [facility] CATH INVASIVE LOCATION;  Service: Cardiology;  Laterality: N/A;    CARDIAC CATHETERIZATION N/A 1/29/2023    Procedure: Coronary angiography;  Surgeon: Johnnie Ang MD;  Location: [facility] CATH INVASIVE LOCATION;  Service: Cardiology;  Laterality: N/A;    COLONOSCOPY  05/17/2013    eh, ih, tort, sig tics    COLONOSCOPY N/A 5/10/2019    Non-thrombosed external hemorrhoids found on perianal exam, Diverticulosis, Tortuous colon, One 5 mm polyp in the mid ascending colon, IH. Path: Tubular adenoma.     COLONOSCOPY N/A 9/24/2022    Procedure: COLONOSCOPY to cecum and TI with argon plasma coagulation.;  Surgeon: Bruce Black MD;  Location: [facility] ENDOSCOPY;  Service: Gastroenterology;  Laterality: N/A;  pre- GI bleeding  post- radiation proctitis, diverticulosis    ENDOSCOPY  03/19/2015    z line irreg, hh    ENDOSCOPY N/A 9/24/2022    Procedure: ESOPHAGOGASTRODUODENOSCOPY;  Surgeon: Bruce Black MD;  Location: [facility] ENDOSCOPY;  Service: Gastroenterology;  Laterality: N/A;  pre- GI bleeding  post- esophagitis, hiatal hernia    HIP OPEN REDUCTION Right 1/17/2018     Procedure: HIP OPEN REDUCTION INTERNAL FIXATION WITH DYNAMIC HIP SCREW;  Surgeon: Les Black MD;  Location: [facility];  Service:     SPINE SURGERY      TONSILLECTOMY      TOTAL HIP ARTHROPLASTY REVISION Right 9/23/2019    Procedure: HIP  REVISION RIGHT;  Surgeon: Isauro Warner MD;  Location: [facility];  Service: Orthopedics         FAMILY HISTORY  Family History   Problem Relation Age of Onset    Thyroid disease Mother     No Known Problems Father     Malig Hyperthermia Neg Hx          SOCIAL HISTORY  Social History     Socioeconomic History    Marital status: Single   Tobacco Use    Smoking status: Never    Smokeless tobacco: Never   Vaping Use    Vaping status: Never Used   Substance and Sexual Activity    Alcohol use: No     Comment: red wine occassional    Drug use: No    Sexual activity: Defer         ALLERGIES  Daliresp [roflumilast], Latex, and Sulfa antibiotics      REVIEW OF SYSTEMS  Included in HPI  All systems reviewed and negative except for those discussed in HPI.      PHYSICAL EXAM    I have reviewed the triage vital signs and nursing notes.    ED Triage Vitals [04/08/24 2049]   Temp Heart Rate Resp BP SpO2   96.7 °F (35.9 °C) 72 16 125/87 99 %      Temp src Heart Rate Source Patient Position BP Location FiO2 (%)   -- -- -- -- --       GENERAL: not distressed  HENT: nares patent  Small hematoma to posterior scalp  EYES: no scleral icterus  CV: regular rhythm, regular rate with intact distal pulses  RESPIRATORY: normal effort and no respiratory distress  ABDOMEN: soft and non-tender  MUSCULOSKELETAL: no deformity  Right hip tenderness, no shortening or rotation of right leg.  Pelvis is stable to compression and rocking.  Cervical spine: No step-offs or deformities, no midline tenderness, bilateral paraspinal tenderness to palpation, pain is reproducible.  NEURO: alert and appropriate, moves all extremities, follows commands  SKIN: warm, dry    Vital signs and nursing notes  reviewed.      LAB RESULTS  Recent Results (from the past 24 hour(s))   ECG 12 Lead ED Triage Standing Order; Weak / Dizzy / AMS    Collection Time: 04/08/24  9:07 PM   Result Value Ref Range    QT Interval 357 ms    QTC Interval 427 ms   POC Glucose Once    Collection Time: 04/08/24  9:14 PM    Specimen: Blood   Result Value Ref Range    Glucose 105 70 - 130 mg/dL   Comprehensive Metabolic Panel    Collection Time: 04/08/24  9:18 PM    Specimen: Blood   Result Value Ref Range    Glucose 110 (H) 65 - 99 mg/dL    BUN 23 8 - 23 mg/dL    Creatinine 0.97 0.76 - 1.27 mg/dL    Sodium 141 136 - 145 mmol/L    Potassium 4.6 3.5 - 5.2 mmol/L    Chloride 110 (H) 98 - 107 mmol/L    CO2 23.0 22.0 - 29.0 mmol/L    Calcium 7.8 (L) 8.6 - 10.5 mg/dL    Total Protein 5.4 (L) 6.0 - 8.5 g/dL    Albumin 3.5 3.5 - 5.2 g/dL    ALT (SGPT) 24 1 - 41 U/L    AST (SGOT) 19 1 - 40 U/L    Alkaline Phosphatase 47 39 - 117 U/L    Total Bilirubin 0.2 0.0 - 1.2 mg/dL    Globulin 1.9 gm/dL    A/G Ratio 1.8 g/dL    BUN/Creatinine Ratio 23.7 7.0 - 25.0    Anion Gap 8.0 5.0 - 15.0 mmol/L    eGFR 76.5 >60.0 mL/min/1.73   Single High Sensitivity Troponin T    Collection Time: 04/08/24  9:18 PM    Specimen: Blood   Result Value Ref Range    HS Troponin T 40 (H) <22 ng/L   Magnesium    Collection Time: 04/08/24  9:18 PM    Specimen: Blood   Result Value Ref Range    Magnesium 2.1 1.6 - 2.4 mg/dL   Green Top (Gel)    Collection Time: 04/08/24  9:18 PM   Result Value Ref Range    Extra Tube Hold for add-ons.    Lavender Top    Collection Time: 04/08/24  9:18 PM   Result Value Ref Range    Extra Tube hold for add-on    Light Blue Top    Collection Time: 04/08/24  9:18 PM   Result Value Ref Range    Extra Tube Hold for add-ons.    CBC Auto Differential    Collection Time: 04/08/24  9:18 PM    Specimen: Blood   Result Value Ref Range    WBC 7.34 3.40 - 10.80 10*3/mm3    RBC 3.87 (L) 4.14 - 5.80 10*6/mm3    Hemoglobin 10.3 (L) 13.0 - 17.7 g/dL    Hematocrit 32.9  (L) 37.5 - 51.0 %    MCV 85.0 79.0 - 97.0 fL    MCH 26.6 26.6 - 33.0 pg    MCHC 31.3 (L) 31.5 - 35.7 g/dL    RDW 16.9 (H) 12.3 - 15.4 %    RDW-SD 51.3 37.0 - 54.0 fl    MPV 8.8 6.0 - 12.0 fL    Platelets 166 140 - 450 10*3/mm3    Neutrophil % 70.7 42.7 - 76.0 %    Lymphocyte % 17.3 (L) 19.6 - 45.3 %    Monocyte % 10.5 5.0 - 12.0 %    Eosinophil % 0.0 (L) 0.3 - 6.2 %    Basophil % 0.1 0.0 - 1.5 %    Immature Grans % 1.4 (H) 0.0 - 0.5 %    Neutrophils, Absolute 5.19 1.70 - 7.00 10*3/mm3    Lymphocytes, Absolute 1.27 0.70 - 3.10 10*3/mm3    Monocytes, Absolute 0.77 0.10 - 0.90 10*3/mm3    Eosinophils, Absolute 0.00 0.00 - 0.40 10*3/mm3    Basophils, Absolute 0.01 0.00 - 0.20 10*3/mm3    Immature Grans, Absolute 0.10 (H) 0.00 - 0.05 10*3/mm3    nRBC 0.0 0.0 - 0.2 /100 WBC   Calcium, Ionized    Collection Time: 04/08/24 10:20 PM    Specimen: Blood   Result Value Ref Range    Ionized Calcium 1.22 1.15 - 1.35 mmol/L    Ionized Calcium 4.9 4.6 - 5.4 mg/dL   Urinalysis With Microscopic If Indicated (No Culture) - Urine, Clean Catch    Collection Time: 04/09/24 12:15 AM    Specimen: Urine, Clean Catch   Result Value Ref Range    Color, UA Yellow Yellow, Straw    Appearance, UA Clear Clear    pH, UA 6.0 5.0 - 8.0    Specific Gravity, UA 1.022 1.005 - 1.030    Glucose, UA Negative Negative    Ketones, UA Negative Negative    Bilirubin, UA Negative Negative    Blood, UA Negative Negative    Protein, UA Negative Negative    Leuk Esterase, UA Negative Negative    Nitrite, UA Negative Negative    Urobilinogen, UA 0.2 E.U./dL 0.2 - 1.0 E.U./dL   Single High Sensitivity Troponin T    Collection Time: 04/09/24 12:19 AM    Specimen: Blood   Result Value Ref Range    HS Troponin T 46 (H) <22 ng/L         RADIOLOGY  CT Pelvis Without Contrast    Result Date: 4/8/2024  CT PELVIS WITHOUT CONTRAST  HISTORY: Fall with right hip pain.  TECHNIQUE: Pelvic CT includes axial imaging through the pelvis without contrast and data reconstructed in  coronal and sagittal planes. Radiation does reduction techniques were utilized, including automated exposure control and exposure modulation based on body size.  COMPARISON: CT abdomen and pelvis 10/18/2023.  FINDINGS: There are old healed bilateral superior and inferior pubic rami fractures. Right total hip arthroplasty is present and this limits evaluation due to associated beam hardening artifact. The tip of the femoral stem is not included on the field-of-view.  There is degenerative disc disease at L4-5 and L5-S1 where there is disc space narrowing and vacuum phenomena. Bilateral facet arthritis is present in the lower lumbar spine. Atherosclerotic calcifications are present involving the abdominal aorta and iliac vasculature. There is sigmoid diverticulosis without evidence for diverticulitis.      1. Old healed bilateral superior and inferior pubic rami fractures. 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 Note that the tip of the femoral stem is not included in the field-of-view and if there are symptoms at this location, x-ray is recommended to evaluate the tip of the femoral stem. 2. 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 3. Colonic diverticulosis without evidence for diverticulitis.  This report was finalized on 4/8/2024 11:03 PM by Dr. Lexa Pina M.D on Workstation: [id number]      CT Head Without Contrast    Result Date: 4/8/2024  Patient: RAMIRO CASEY  Time Out: 22:51 Exam(s): CT HEAD Without Contrast EXAM:   CT Head Without Intravenous Contrast CLINICAL HISTORY:    Reason for exam: fall, head injury, dizziness. TECHNIQUE:   Axial computed tomography images of the head brain without intravenous contrast.  This CT exam was performed according to the principle of ALARA (As Low As Reasonably Achievable) by using one or more of the following dose reduction  techniques: automated exposure control, adjustment of the mA and or kV according to patient size, and or use of iterative reconstruction technique. COMPARISON:   No relevant prior studies available. FINDINGS: No acute intracranial hemorrhage.  No midline shift or mass effect. The territorial gray-white matter differentiation is maintained throughout. Age-related cerebral volume loss.  Periventricular and subcortical white matter hypoattenuation, consistent with chronic microangiopathy. The visualized orbits appear grossly unremarkable. The calvarium is intact. The visualized paranasal sinuses and mastoid air cells are grossly clear. IMPRESSION: No acute intracranial hemorrhage, midline shift, or mass effect.     Electronically signed by Jason Whitaker MD on 04-08-24 at 2251    CT Cervical Spine Without Contrast    Result Date: 4/8/2024  Patient: RAMIRO CASEY  Time Out: 22:50 Exam(s): CT C SPINE EXAM:   CT Cervical Spine Without Intravenous Contrast CLINICAL HISTORY:    Reason for exam: fall, neck pain. TECHNIQUE:   Axial computed tomography images of the cervical spine without intravenous contrast.  This CT exam was performed according to the principle of ALARA (As Low As Reasonably Achievable) by using one or more of the following dose reduction techniques: automated exposure control, adjustment of the mA and or kV according to patient size, and or use of iterative reconstruction technique. COMPARISON:   No relevant prior studies available. FINDINGS: The vertebral body heights are maintained. The craniocervical junction is intact. The atlanto-dens interval is maintained. The dens is intact. There is no spondylolisthesis.  ACDF extending from C3-C7.  The plate is well seated.  No CT evidence of hardware complication. Multilevel cervical spondylosis and degenerative disc disease. Straightening of the cervical lordosis. The unenhanced neck soft tissues are grossly unremarkable.  The visualized lung apices are grossly  clear. IMPRESSION: No acute fracture or subluxation of the cervical spine. ACDF extending from C3-C7.  The plate is well seated.  No CT evidence of hardware complication.     Electronically signed by Jason Whitaker MD on 04-08-24 at 2250    XR Chest 1 View    Result Date: 4/8/2024  CHEST SINGLE VIEW  HISTORY: Weakness and dizziness.  COMPARISON: AP chest 02/10/2024.  FINDINGS: There is elevation of the right hemidiaphragm. Heart size appears within normal limits. Lungs are clear of focal airspace disease and there is no evidence for pulmonary edema. However, there is blunting of the costophrenic angle suspected to be due to small pleural effusions. Cervical spine plate and screws are noted. Cardiac monitoring leads are noted. The bones are osteopenic.      Blunting of the costophrenic angles suspected be due to small pleural effusions. There is no further evidence for active disease in the chest.  This report was finalized on 4/8/2024 10:33 PM by Dr. Lexa Pina M.D on Workstation: [id number]         MEDICATIONS GIVEN IN ER  Medications   sodium chloride 0.9 % flush 10 mL (has no administration in time range)           OUTPATIENT MEDICATION MANAGEMENT:  Current Facility-Administered Medications Ordered in Epic   Medication Dose Route Frequency Provider Last Rate Last Admin    sodium chloride 0.9 % flush 10 mL  10 mL Intravenous PRN Emergency, Triage Protocol, MD         Current Outpatient Medications Ordered in Epic   Medication Sig Dispense Refill    acetaminophen (TYLENOL) 325 MG tablet Take 2 tablets by mouth Every 4 (Four) Hours As Needed for Mild Pain.      albuterol (PROVENTIL) (2.5 MG/3ML) 0.083% nebulizer solution Take 2.5 mg by nebulization Every 6 (Six) Hours As Needed for Wheezing. 360 mL 3    calcium carbonate (TUMS) 500 MG chewable tablet Chew 1 tablet As Needed for Indigestion or Heartburn.      dicyclomine (BENTYL) 10 MG capsule Take 1 capsule by mouth 3 (Three) Times a Day As Needed (abdominal  bloating). 30 capsule 0    FLUoxetine (PROzac) 20 MG capsule TAKE 1 CAPSULE DAILY 90 capsule 3    gabapentin (NEURONTIN) 300 MG capsule Take 1 capsule by mouth 3 (Three) Times a Day. 20 capsule 0    guaiFENesin (MUCINEX) 600 MG 12 hr tablet Take 1 tablet by mouth Every 12 (Twelve) Hours.      guaiFENesin-dextromethorphan (ROBITUSSIN DM) 100-10 MG/5ML syrup Take 5 mL by mouth Every 4 (Four) Hours As Needed for Cough.      multivitamin with minerals (Multivitamin Adult) tablet tablet Take 1 tablet by mouth Daily. 30 tablet 11    ondansetron ODT (ZOFRAN-ODT) 4 MG disintegrating tablet Place 1 tablet on the tongue Every 6 (Six) Hours As Needed for Nausea or Vomiting.      pantoprazole (PROTONIX) 40 MG EC tablet Take 1 tablet by mouth Daily. 90 tablet 3    tiZANidine (ZANAFLEX) 2 MG tablet Take 1 tablet by mouth At Night As Needed for Muscle Spasms. 30 tablet 1    Umeclidinium-Vilanterol (ANORO ELLIPTA) 62.5-25 MCG/ACT aerosol powder  inhaler Inhale 1 puff Daily. 1 each 11         PROGRESS, DATA ANALYSIS, CONSULTS, AND MEDICAL DECISION MAKING  ORDERS PLACED DURING THIS VISIT:  Orders Placed This Encounter   Procedures    XR Chest 1 View    CT Head Without Contrast    CT Cervical Spine Without Contrast    CT Pelvis Without Contrast    Condon Draw    Comprehensive Metabolic Panel    Single High Sensitivity Troponin T    Magnesium    Urinalysis With Microscopic If Indicated (No Culture) - Urine, Clean Catch    CBC Auto Differential    Calcium, Ionized    Single High Sensitivity Troponin T    NPO Diet NPO Type: Strict NPO    Undress & Gown    Continuous Pulse Oximetry    Vital Signs    Orthostatic Blood Pressure    [facility] (on-call MD unless specified) Details    Oxygen Therapy- Nasal Cannula; Titrate 1-6 LPM Per SpO2; 90 - 95%    POC Glucose Once    POC Glucose Once    ECG 12 Lead ED Triage Standing Order; Weak / Dizzy / AMS    Insert Peripheral IV    Fall Precautions    CBC & Differential    Green Top (Gel)    Lavender Top     Gold Top - SST    Light Blue Top       All labs have been independently interpreted by me.  All radiology studies have been reviewed by me. All EKG's have been independently viewed by me.  Discussion below represents my analysis of pertinent findings related to patient's condition, differential diagnosis, treatment plan and final disposition.    Differential diagnosis includes but is not limited to:   ICH, skull fracture, electrolyte imbalance, etc.    ED Course/Progress Notes/MDM:  ED Course as of 04/09/24 0445   Mon Apr 08, 2024 2129 Hemoglobin(!): 10.3  Improved from previous. [AR]   2130 I discussed the case with Dr. Montesinos and they agree to evaluate the patient at the bedside.    [AR]   2205 HS Troponin T(!): 40 [AR]   2206 Calcium(!): 7.8  Ionized calcium ordered. [AR]   Tue Apr 09, 2024   0014 Ionized Calcium: 4.9 [AR]   0048 HS Troponin T(!): 46 [AR]   0211 Patient reassessed.  Discussed ED findings, differential diagnosis, and the need for admission for evaluation/treatment.  They are agreeable to admission and all questions were answered.     [AR]   0243 Phone call with ADRIAN Fabian with [facility].  Discussed the patient, relevant history, exam, diagnostics, ED findings/progress, and concerns. They agree to admit the patient to Dr. Herrera. Care assumed by the admitting physician at this time.     [AR]      ED Course User Index  [AR] Ashley Bhat, PABLITO         COMPLEXITY OF CARE  The patient requires admission.        DIAGNOSIS  Final diagnoses:   Dizziness   Closed head injury, initial encounter   Strain of neck muscle, initial encounter   Fall, initial encounter         DISPOSITION  ED Disposition       ED Disposition   Decision to Admit    Condition   --    Comment   --                    Please note that portions of this document were completed with a voice recognition program.    Note Disclaimer: At [facility], we believe that sharing information builds trust and better relationships. You are  receiving this note because you recently visited [facility]. It is possible you will see health information before a provider has talked with you about it. This kind of information can be easy to misunderstand. To help you fully understand what it means for your health, we urge you to discuss this note with your provider.     Ashley Bhat, PABLITO  04/09/24 0446

## 2024-04-09 NOTE — H&P
Patient Name:  Tony Leonard  YOB: 1940  MRN:  [id number]  Admit Date:  4/8/2024  Patient Care Team:  Alfonso Goldstein MD as PCP - General (Family Medicine)  Katharina Salter MD as Consulting Physician (Pulmonary Disease)  Anum Bhatt MD as Consulting Physician (Pain Medicine)  Azeb Cook, RN as Ambulatory  ([facility])      Subjective   History Present Illness     Chief Complaint   Patient presents with    Fall    Dizziness       Mr. Leonard is a 83 y.o. male with a history of ADHD, bronchiectasis, CHF, COPD on home O2 and spinal stenosis that presents to [facility] complaining of dizziness and a fall. He describes an episode of dizziness at the nursing home when he bent over to pick something up off the floor.  He became very dizzy fell to the ground and hit his head on the side of the bed.  There is no LOC.  Was brought into the ED for further evaluation.  Dizziness is better today but has not really been out of bed yet.  No headache or nausea.  He has chronic right-sided hip pain that he says was little worse than usual last night but now is back to baseline.      Review of Systems   Constitutional: Negative.    HENT: Negative.     Eyes: Negative.    Respiratory: Negative.     Gastrointestinal: Negative.    Endocrine: Negative.    Genitourinary: Negative.    Musculoskeletal: Negative.    Skin: Negative.    Neurological:  Positive for dizziness.   Hematological: Negative.    Psychiatric/Behavioral: Negative.          Personal History     Past Medical History:   Diagnosis Date    ADHD (attention deficit hyperactivity disorder)     Bronchiectasis     CHF (congestive heart failure)     Colon polyp     COPD (chronic obstructive pulmonary disease)     Diverticulosis     Hard of hearing     On home oxygen therapy     2 LITERS    Pneumonia     Prostate cancer 04/22/2019    Spinal stenosis, lumbar region with neurogenic claudication 08/02/2022      Past Surgical History:   Procedure Laterality Date    BRONCHOSCOPY N/A 4/30/2016    Procedure: BRONCHOSCOPY with BAL of right lower lobe and left  lower lobe.  ;  Surgeon: Nando Diaz MD;  Location: [facility] ENDOSCOPY;  Service:     BRONCHOSCOPY N/A 4/28/2017    Procedure: BRONCHOSCOPY;  Surgeon: Katharina Salter MD;  Location: [facility] ENDOSCOPY;  Service:     BRONCHOSCOPY Bilateral 6/29/2017    Procedure: BRONCHOSCOPY WITH WASHINGS;  Surgeon: Katharina Salter MD;  [facility];  Service:     BRONCHOSCOPY N/A 1/22/2018    Procedure: BRONCHOSCOPY AT BEDSIDE with BAL;  Surgeon: Katharina Salter MD;  Location: [facility] ENDOSCOPY;  Service:     BRONCHOSCOPY N/A 1/30/2018    Procedure: BRONCHOSCOPY with BAL and washing;  Surgeon: Shreyas Wild MD;  Location: [facility] ENDOSCOPY;  Service:     BRONCHOSCOPY Bilateral 4/24/2018    Procedure: BRONCHOSCOPY WITH WASHING;  Surgeon: Katharina Salter MD;  Location: [facility] ENDOSCOPY;  Service: Pulmonary    BRONCHOSCOPY N/A 12/28/2019    Procedure: BRONCHOSCOPY with bilateral washing;  Surgeon: Katharina Salter MD;  Location: [facility] ENDOSCOPY;  Service: Pulmonary    BRONCHOSCOPY Bilateral 1/4/2023    Procedure: BRONCHOSCOPY with lavage;  Surgeon: Katharina Salter MD;  Location: [facility] ENDOSCOPY;  Service: Pulmonary;  Laterality: Bilateral;  mucus plugging    CARDIAC CATHETERIZATION N/A 1/29/2023    Procedure: Left Heart Cath;  Surgeon: Johnnie Ang MD;  Location: [facility] CATH INVASIVE LOCATION;  Service: Cardiology;  Laterality: N/A;    CARDIAC CATHETERIZATION N/A 1/29/2023    Procedure: Coronary angiography;  Surgeon: Johnnie Ang MD;  Location: [facility] CATH INVASIVE LOCATION;  Service: Cardiology;  Laterality: N/A;    COLONOSCOPY  05/17/2013    eh, ih, tort, sig tics    COLONOSCOPY N/A 5/10/2019    Non-thrombosed external hemorrhoids found on perianal exam, Diverticulosis, Tortuous colon, One 5 mm polyp in the mid ascending colon, IH. Path: Tubular  adenoma.     COLONOSCOPY N/A 9/24/2022    Procedure: COLONOSCOPY to cecum and TI with argon plasma coagulation.;  Surgeon: Bruce Black MD;  Location: [facility] ENDOSCOPY;  Service: Gastroenterology;  Laterality: N/A;  pre- GI bleeding  post- radiation proctitis, diverticulosis    ENDOSCOPY  03/19/2015    z line irreg, hh    ENDOSCOPY N/A 9/24/2022    Procedure: ESOPHAGOGASTRODUODENOSCOPY;  Surgeon: Bruce Black MD;  [facility];  Service: Gastroenterology;  Laterality: N/A;  pre- GI bleeding  post- esophagitis, hiatal hernia    HIP OPEN REDUCTION Right 1/17/2018    Procedure: HIP OPEN REDUCTION INTERNAL FIXATION WITH DYNAMIC HIP SCREW;  Surgeon: Les Black MD;  Location: [facility] MAIN OR;  Service:     SPINE SURGERY      TONSILLECTOMY      TOTAL HIP ARTHROPLASTY REVISION Right 9/23/2019    Procedure: HIP  REVISION RIGHT;  Surgeon: Isauro Warner MD;  Location: [facility] MAIN OR;  Service: Orthopedics     Family History   Problem Relation Age of Onset    Thyroid disease Mother     No Known Problems Father     Malig Hyperthermia Neg Hx      Social History     Tobacco Use    Smoking status: Never    Smokeless tobacco: Never   Vaping Use    Vaping status: Never Used   Substance Use Topics    Alcohol use: No     Comment: red wine occassional    Drug use: No     No current facility-administered medications on file prior to encounter.     Current Outpatient Medications on File Prior to Encounter   Medication Sig Dispense Refill    acetaminophen (TYLENOL) 325 MG tablet Take 2 tablets by mouth Every 4 (Four) Hours As Needed for Mild Pain.      albuterol (PROVENTIL) (2.5 MG/3ML) 0.083% nebulizer solution Take 2.5 mg by nebulization Every 6 (Six) Hours As Needed for Wheezing. 360 mL 3    calcium carbonate (TUMS) 500 MG chewable tablet Chew 1 tablet As Needed for Indigestion or Heartburn.      dicyclomine (BENTYL) 10 MG capsule Take 1 capsule by mouth 3 (Three) Times a Day As Needed (abdominal bloating).  30 capsule 0    FLUoxetine (PROzac) 20 MG capsule TAKE 1 CAPSULE DAILY 90 capsule 3    gabapentin (NEURONTIN) 300 MG capsule Take 1 capsule by mouth 3 (Three) Times a Day. 20 capsule 0    guaiFENesin (MUCINEX) 600 MG 12 hr tablet Take 1 tablet by mouth Every 12 (Twelve) Hours. (Patient taking differently: Take 1 tablet by mouth 2 (Two) Times a Day As Needed for Cough or Congestion.)      guaiFENesin-dextromethorphan (ROBITUSSIN DM) 100-10 MG/5ML syrup Take 5 mL by mouth Every 4 (Four) Hours As Needed for Cough.      multivitamin with minerals (Multivitamin Adult) tablet tablet Take 1 tablet by mouth Daily. 30 tablet 11    ondansetron ODT (ZOFRAN-ODT) 4 MG disintegrating tablet Place 1 tablet on the tongue Every 6 (Six) Hours As Needed for Nausea or Vomiting.      pantoprazole (PROTONIX) 40 MG EC tablet Take 1 tablet by mouth Daily. 90 tablet 3    tiZANidine (ZANAFLEX) 2 MG tablet Take 1 tablet by mouth At Night As Needed for Muscle Spasms. (Patient taking differently: Take 1 tablet by mouth Daily As Needed for Muscle Spasms.) 30 tablet 1    Umeclidinium-Vilanterol (ANORO ELLIPTA) 62.5-25 MCG/ACT aerosol powder  inhaler Inhale 1 puff Daily. 1 each 11     Allergies   Allergen Reactions    Daliresp [Roflumilast] Anaphylaxis    Latex Rash    Sulfa Antibiotics Rash       Objective    Objective     Vital Signs  Temp:  [96.7 °F (35.9 °C)-98.6 °F (37 °C)] 98.1 °F (36.7 °C)  Heart Rate:  [69-90] 90  Resp:  [16-20] 20  BP: (125-158)/() 128/74  SpO2:  [96 %-99 %] 98 %  on  Flow (L/min):  [2] 2;   Device (Oxygen Therapy): room air  Body mass index is 26.47 kg/m².    Physical Exam  Vitals and nursing note reviewed.   Constitutional:       Appearance: He is well-developed.   HENT:      Head: Normocephalic and atraumatic.   Eyes:      General: No scleral icterus.  Cardiovascular:      Rate and Rhythm: Normal rate and regular rhythm.   Pulmonary:      Effort: Pulmonary effort is normal.      Breath sounds: Normal breath  sounds.   Abdominal:      General: Bowel sounds are normal. There is no distension.      Palpations: Abdomen is soft.      Tenderness: There is no abdominal tenderness.   Skin:     General: Skin is warm and dry.   Neurological:      Mental Status: He is alert. Mental status is at baseline.         Results Review:  I reviewed the patient's new clinical results.  I reviewed the patient's new imaging results and agree with the interpretation.  I reviewed the patient's other test results and agree with the interpretation  I personally viewed and interpreted the patient's EKG/Telemetry data  Discussed with ED provider.    Lab Results (last 24 hours)       Procedure Component Value Units Date/Time    POC Glucose Once [[id number]]  (Normal) Collected: 04/08/24 2114    Specimen: Blood Updated: 04/08/24 2116     Glucose 105 mg/dL     CBC & Differential [[id number]]  (Abnormal) Collected: 04/08/24 2118    Specimen: Blood Updated: 04/08/24 2127    Narrative:      The following orders were created for panel order CBC & Differential.  Procedure                               Abnormality         Status                     ---------                               -----------         ------                     CBC Auto Differential[[id number]]        Abnormal            Final result                 Please view results for these tests on the individual orders.    Comprehensive Metabolic Panel [[id number]]  (Abnormal) Collected: 04/08/24 2118    Specimen: Blood Updated: 04/08/24 2146     Glucose 110 mg/dL      BUN 23 mg/dL      Creatinine 0.97 mg/dL      Sodium 141 mmol/L      Potassium 4.6 mmol/L      Comment: Slight hemolysis detected by analyzer. Result may be falsely elevated.        Chloride 110 mmol/L      CO2 23.0 mmol/L      Calcium 7.8 mg/dL      Total Protein 5.4 g/dL      Albumin 3.5 g/dL      ALT (SGPT) 24 U/L      AST (SGOT) 19 U/L      Comment: Slight hemolysis detected by analyzer. Result may be falsely elevated.         Alkaline Phosphatase 47 U/L      Total Bilirubin 0.2 mg/dL      Globulin 1.9 gm/dL      A/G Ratio 1.8 g/dL      BUN/Creatinine Ratio 23.7     Anion Gap 8.0 mmol/L      eGFR 76.5 mL/min/1.73     Narrative:      GFR Normal >60  Chronic Kidney Disease <60  Kidney Failure <15    The GFR formula is only valid for adults with stable renal function between ages 18 and 70.    Single High Sensitivity Troponin T [[id number]]  (Abnormal) Collected: 04/08/24 2118    Specimen: Blood Updated: 04/08/24 2145     HS Troponin T 40 ng/L     Narrative:      High Sensitive Troponin T Reference Range:  <14.0 ng/L- Negative Female for AMI  <22.0 ng/L- Negative Male for AMI  >=14 - Abnormal Female indicating possible myocardial injury.  >=22 - Abnormal Male indicating possible myocardial injury.   Clinicians would have to utilize clinical acumen, EKG, Troponin, and serial changes to determine if it is an Acute Myocardial Infarction or myocardial injury due to an underlying chronic condition.         Magnesium [[id number]]  (Normal) Collected: 04/08/24 2118    Specimen: Blood Updated: 04/08/24 2146     Magnesium 2.1 mg/dL     CBC Auto Differential [[id number]]  (Abnormal) Collected: 04/08/24 2118    Specimen: Blood Updated: 04/08/24 2127     WBC 7.34 10*3/mm3      RBC 3.87 10*6/mm3      Hemoglobin 10.3 g/dL      Hematocrit 32.9 %      MCV 85.0 fL      MCH 26.6 pg      MCHC 31.3 g/dL      RDW 16.9 %      RDW-SD 51.3 fl      MPV 8.8 fL      Platelets 166 10*3/mm3      Neutrophil % 70.7 %      Lymphocyte % 17.3 %      Monocyte % 10.5 %      Eosinophil % 0.0 %      Basophil % 0.1 %      Immature Grans % 1.4 %      Neutrophils, Absolute 5.19 10*3/mm3      Lymphocytes, Absolute 1.27 10*3/mm3      Monocytes, Absolute 0.77 10*3/mm3      Eosinophils, Absolute 0.00 10*3/mm3      Basophils, Absolute 0.01 10*3/mm3      Immature Grans, Absolute 0.10 10*3/mm3      nRBC 0.0 /100 WBC     Calcium, Ionized [[id number]]  (Normal) Collected: 04/08/24 2220     Specimen: Blood Updated: 04/08/24 2301     Ionized Calcium 1.22 mmol/L      Ionized Calcium 4.9 mg/dL     Urinalysis With Microscopic If Indicated (No Culture) - Urine, Clean Catch [[id number]]  (Normal) Collected: 04/09/24 0015    Specimen: Urine, Clean Catch Updated: 04/09/24 0030     Color, UA Yellow     Appearance, UA Clear     pH, UA 6.0     Specific Gravity, UA 1.022     Glucose, UA Negative     Ketones, UA Negative     Bilirubin, UA Negative     Blood, UA Negative     Protein, UA Negative     Leuk Esterase, UA Negative     Nitrite, UA Negative     Urobilinogen, UA 0.2 E.U./dL    Narrative:      Urine microscopic not indicated.    Single High Sensitivity Troponin T [[id number]]  (Abnormal) Collected: 04/09/24 0019    Specimen: Blood Updated: 04/09/24 0047     HS Troponin T 46 ng/L     Narrative:      High Sensitive Troponin T Reference Range:  <14.0 ng/L- Negative Female for AMI  <22.0 ng/L- Negative Male for AMI  >=14 - Abnormal Female indicating possible myocardial injury.  >=22 - Abnormal Male indicating possible myocardial injury.   Clinicians would have to utilize clinical acumen, EKG, Troponin, and serial changes to determine if it is an Acute Myocardial Infarction or myocardial injury due to an underlying chronic condition.         Basic Metabolic Panel [[id number]]  (Abnormal) Collected: 04/09/24 0449    Specimen: Blood Updated: 04/09/24 0629     Glucose 114 mg/dL      BUN 20 mg/dL      Creatinine 0.91 mg/dL      Sodium 142 mmol/L      Potassium 4.2 mmol/L      Chloride 110 mmol/L      CO2 22.0 mmol/L      Calcium 8.1 mg/dL      BUN/Creatinine Ratio 22.0     Anion Gap 10.0 mmol/L      eGFR 82.6 mL/min/1.73     Narrative:      GFR Normal >60  Chronic Kidney Disease <60  Kidney Failure <15    The GFR formula is only valid for adults with stable renal function between ages 18 and 70.    CBC (No Diff) [[id number]]  (Abnormal) Collected: 04/09/24 0449    Specimen: Blood Updated: 04/09/24 0607     WBC 6.87  10*3/mm3      RBC 3.78 10*6/mm3      Hemoglobin 10.1 g/dL      Hematocrit 31.7 %      MCV 83.9 fL      MCH 26.7 pg      MCHC 31.9 g/dL      RDW 16.7 %      RDW-SD 50.4 fl      MPV 9.2 fL      Platelets 174 10*3/mm3     High Sensitivity Troponin T [[id number]]  (Abnormal) Collected: 04/09/24 0449    Specimen: Blood Updated: 04/09/24 0629     HS Troponin T 39 ng/L     Narrative:      High Sensitive Troponin T Reference Range:  <14.0 ng/L- Negative Female for AMI  <22.0 ng/L- Negative Male for AMI  >=14 - Abnormal Female indicating possible myocardial injury.  >=22 - Abnormal Male indicating possible myocardial injury.   Clinicians would have to utilize clinical acumen, EKG, Troponin, and serial changes to determine if it is an Acute Myocardial Infarction or myocardial injury due to an underlying chronic condition.         High Sensitivity Troponin T 2Hr [[id number]]  (Abnormal) Collected: 04/09/24 0655    Specimen: Blood Updated: 04/09/24 0742     HS Troponin T 39 ng/L      Troponin T Delta 0 ng/L     Narrative:      High Sensitive Troponin T Reference Range:  <14.0 ng/L- Negative Female for AMI  <22.0 ng/L- Negative Male for AMI  >=14 - Abnormal Female indicating possible myocardial injury.  >=22 - Abnormal Male indicating possible myocardial injury.   Clinicians would have to utilize clinical acumen, EKG, Troponin, and serial changes to determine if it is an Acute Myocardial Infarction or myocardial injury due to an underlying chronic condition.                 Imaging Results (Last 24 Hours)       Procedure Component Value Units Date/Time    CT Pelvis Without Contrast [[id number]] Collected: 04/08/24 2250     Updated: 04/08/24 2307    Narrative:      CT PELVIS WITHOUT CONTRAST     HISTORY: Fall with right hip pain.      TECHNIQUE: Pelvic CT includes axial imaging through the pelvis without  contrast and data reconstructed in coronal and sagittal planes.  Radiation does reduction techniques were utilized, including  automated  exposure control and exposure modulation based on body size.     COMPARISON: CT abdomen and pelvis 10/18/2023.      FINDINGS: There are old healed bilateral superior and inferior pubic  rami fractures. Right total hip arthroplasty is present and this limits  evaluation due to associated beam hardening artifact. The tip of the  femoral stem is not included on the field-of-view.     There is degenerative disc disease at L4-5 and L5-S1 where there is disc  space narrowing and vacuum phenomena. Bilateral facet arthritis is  present in the lower lumbar spine. Atherosclerotic calcifications are  present involving the abdominal aorta and iliac vasculature. There is  sigmoid diverticulosis without evidence for diverticulitis.       Impression:      1. Old healed bilateral superior and inferior pubic rami fractures. No  evidence for acute abnormality of the right hip on evaluation limited by  metal related artifact associated with a right total hip arthroplasty.  Note that the tip of the femoral stem is not included in the  field-of-view and if there are symptoms at this location, x-ray is  recommended to evaluate the tip of the femoral stem.  2. Degenerative disc disease and facet arthritis in the lower lumbar  spine.  3. Colonic diverticulosis without evidence for diverticulitis.     This report was finalized on 4/8/2024 11:03 PM by Dr. Lexa Pina M.D on Workstation: [id number]       CT Head Without Contrast [[id number]] Collected: 04/08/24 2252     Updated: 04/08/24 2252    Narrative:        Patient: RAMIRO CASEY  Time Out: 22:51  Exam(s): CT HEAD Without Contrast     EXAM:    CT Head Without Intravenous Contrast    CLINICAL HISTORY:     Reason for exam: fall, head injury, dizziness.    TECHNIQUE:    Axial computed tomography images of the head brain without intravenous   contrast.  This CT exam was performed according to the principle of ALARA   (As Low As Reasonably Achievable) by using one or more  of the following   dose reduction techniques: automated exposure control, adjustment of the   mA and or kV according to patient size, and or use of iterative   reconstruction technique.    COMPARISON:    No relevant prior studies available.    FINDINGS:  No acute intracranial hemorrhage.  No midline shift or mass effect.     The territorial gray-white matter differentiation is maintained   throughout.     Age-related cerebral volume loss.  Periventricular and subcortical white   matter hypoattenuation, consistent with chronic microangiopathy.     The visualized orbits appear grossly unremarkable.    The calvarium is intact.    The visualized paranasal sinuses and mastoid air cells are grossly clear.    IMPRESSION:  No acute intracranial hemorrhage, midline shift, or mass effect.      Impression:          Electronically signed by Jason Whitaker MD on 04-08-24 at 2251    CT Cervical Spine Without Contrast [[id number]] Collected: 04/08/24 2251     Updated: 04/08/24 2251    Narrative:        Patient: RAMIRO CASEY  Time Out: 22:50  Exam(s): CT C SPINE     EXAM:    CT Cervical Spine Without Intravenous Contrast    CLINICAL HISTORY:     Reason for exam: fall, neck pain.    TECHNIQUE:    Axial computed tomography images of the cervical spine without   intravenous contrast.  This CT exam was performed according to the   principle of ALARA (As Low As Reasonably Achievable) by using one or more   of the following dose reduction techniques: automated exposure control,   adjustment of the mA and or kV according to patient size, and or use of   iterative reconstruction technique.    COMPARISON:    No relevant prior studies available.    FINDINGS:   The vertebral body heights are maintained. The craniocervical junction is   intact. The atlanto-dens interval is maintained. The dens is intact.    There is no spondylolisthesis.      ACDF extending from C3-C7.  The plate is well seated.  No CT evidence of   hardware  complication.    Multilevel cervical spondylosis and degenerative disc disease.   Straightening of the cervical lordosis.     The unenhanced neck soft tissues are grossly unremarkable.  The   visualized lung apices are grossly clear.    IMPRESSION:  No acute fracture or subluxation of the cervical spine.    ACDF extending from C3-C7.  The plate is well seated.  No CT evidence of   hardware complication.      Impression:          Electronically signed by Jason Whitaker MD on 04-08-24 at 2250    XR Chest 1 View [[id number]] Collected: 04/08/24 2229     Updated: 04/08/24 2236    Narrative:      CHEST SINGLE VIEW     HISTORY: Weakness and dizziness.     COMPARISON: AP chest 02/10/2024.     FINDINGS: There is elevation of the right hemidiaphragm. Heart size  appears within normal limits. Lungs are clear of focal airspace disease  and there is no evidence for pulmonary edema. However, there is blunting  of the costophrenic angle suspected to be due to small pleural  effusions. Cervical spine plate and screws are noted. Cardiac monitoring  leads are noted. The bones are osteopenic.       Impression:      Blunting of the costophrenic angles suspected be due to  small pleural effusions. There is no further evidence for active disease  in the chest.     This report was finalized on 4/8/2024 10:33 PM by Dr. Lexa Pina M.D on Workstation: [id number]               Results for orders placed during the hospital encounter of 12/21/23    Adult Transthoracic Echo Complete W/ Cont if Necessary Per Protocol    Interpretation Summary    Left ventricular ejection fraction appears to be 56 - 60%.    Left ventricular diastolic function was normal.    Moderate dilation of the aortic root is present. Mild dilation of the sinuses of Valsalva is present.    ECG 12 Lead ED Triage Standing Order; Weak / Dizzy / AMS   Preliminary Result   HEART RATE= 86  bpm   RR Interval= 698  ms   [place] Interval= 166  ms   P Horizontal Axis= 28  deg   P Front  Axis= 14  deg   QRSD Interval= 77  ms   QT Interval= 357  ms   QTcB= 427  ms   QRS Axis= -13  deg   T Wave Axis= 22  deg   - OTHERWISE NORMAL ECG -   Sinus rhythm   Abnormal R-wave progression, early transition   Electronically Signed By:    Date and Time of Study: 2024-04-08 21:07:18        Assessment/Plan   Assessment & Plan   Active Hospital Problems    Diagnosis  POA    **Dizziness [R42]  Yes    Contusion of hip [S70.00XA]  Yes    Fall [W19.XXXA]  Yes    Anemia [D64.9]  Yes    Paroxysmal atrial fibrillation [I48.0]  Yes    Chronic respiratory failure with hypoxia [J96.11]  Yes    Elevated troponin level not due myocardial infarction [R79.89]  Yes    HTN (hypertension) [I10]  Yes    On home oxygen therapy [Z99.81]  Not Applicable    Chronic diastolic CHF (congestive heart failure) [I50.32]  Yes    COPD (chronic obstructive pulmonary disease) [J44.9]  Yes      Resolved Hospital Problems   No resolved problems to display.       83 y.o. male admitted with Dizziness.    No apparent injury per imaging studies done in the ED.  Dizziness seems improved.  His hip pain is chronic and at baseline.  Will see how he ambulates with physical therapy.  If no dizziness and no difficulties with ambulation could likely discharge back to nursing home facility either later today or tomorrow.     He was seen by cardiology 4 months ago for similar elevations of troponin at which time he had negative stress test and echocardiogram with recommendations to continue medical management only.    Chronic anemia baseline      SCDs for DVT prophylaxis.  Full code.  Discussed with patient, CCP, care team on multidisciplinary rounds, and ED provider.      Joe Bryant MD  [facility]  04/09/24  10:05 EDT

## 2024-04-09 NOTE — DISCHARGE SUMMARY
Patient Name: Tony Leonard  : 1940  MRN: [id number]    Date of Admission: 2024  Date of Discharge:  2024  Primary Care Physician: Alfonso Goldstein MD      Chief Complaint:   Fall and Dizziness      Discharge Diagnoses     Active Hospital Problems    Diagnosis  POA    **Dizziness [R42]  Yes    Contusion of hip [S70.00XA]  Yes    Fall [W19.XXXA]  Yes    Anemia [D64.9]  Yes    Paroxysmal atrial fibrillation [I48.0]  Yes    Chronic respiratory failure with hypoxia [J96.11]  Yes    Elevated troponin level not due myocardial infarction [R79.89]  Yes    HTN (hypertension) [I10]  Yes    On home oxygen therapy [Z99.81]  Not Applicable    Chronic diastolic CHF (congestive heart failure) [I50.32]  Yes    COPD (chronic obstructive pulmonary disease) [J44.9]  Yes      Resolved Hospital Problems   No resolved problems to display.        Hospital Course     Mr. Leonard is a 83 y.o. male with a history of ADHD, bronchiectasis, CHF, COPD on home O2 and spinal stenosis who presented to [facility] initially complaining of dizziness and a fall.  Please see the admitting history and physical for further details.  He was found to have small hematoma on the back of his head and was admitted to the hospital for further evaluation and treatment.  Head CT here was negative.  He had imaging as outlined below that does not demonstrate any musculoskeletal injury.  He has chronic pain in his right hip but he states this is stable and at baseline.  He was seen by physical therapy and was able to ambulate with a walker with no increase in pain.  His dizziness seems resolved.  Labs are stable and he seems okay to go back to rehab facility today.  Of note he had mild elevations in troponin but this has been worked up in the last 4 months with negative echocardiogram and stress test.  No complaint of chest pain here.      Day of Discharge     Subjective:  He was able to ambulate with physical therapy.  No  further dizziness and hip pain is stable at his chronic baseline.    Physical Exam:  Temp:  [96.7 °F (35.9 °C)-98.6 °F (37 °C)] 98.2 °F (36.8 °C)  Heart Rate:  [69-90] 79  Resp:  [16-20] 20  BP: (124-158)/() 140/73  Body mass index is 26.47 kg/m².  Physical Exam  Vitals and nursing note reviewed.   Constitutional:       Appearance: Normal appearance.   Pulmonary:      Effort: Pulmonary effort is normal.   Neurological:      Mental Status: He is alert. Mental status is at baseline.   Psychiatric:         Mood and Affect: Mood normal.         Consultants     Consult Orders (all) (From admission, onward)       Start     Ordered    04/09/24 0346  Inpatient Case Management  Consult  Once        Provider:  (Not yet assigned)    04/09/24 0345                  Procedures     Imaging Results (All)       Procedure Component Value Units Date/Time    CT Pelvis Without Contrast [[id number]] Collected: 04/08/24 2250     Updated: 04/08/24 2307    Narrative:      CT PELVIS WITHOUT CONTRAST     HISTORY: Fall with right hip pain.      TECHNIQUE: Pelvic CT includes axial imaging through the pelvis without  contrast and data reconstructed in coronal and sagittal planes.  Radiation does reduction techniques were utilized, including automated  exposure control and exposure modulation based on body size.     COMPARISON: CT abdomen and pelvis 10/18/2023.      FINDINGS: There are old healed bilateral superior and inferior pubic  rami fractures. Right total hip arthroplasty is present and this limits  evaluation due to associated beam hardening artifact. The tip of the  femoral stem is not included on the field-of-view.     There is degenerative disc disease at L4-5 and L5-S1 where there is disc  space narrowing and vacuum phenomena. Bilateral facet arthritis is  present in the lower lumbar spine. Atherosclerotic calcifications are  present involving the abdominal aorta and iliac vasculature. There is  sigmoid  diverticulosis without evidence for diverticulitis.       Impression:      1. Old healed bilateral superior and inferior pubic rami fractures. No  evidence for acute abnormality of the right hip on evaluation limited by  metal related artifact associated with a right total hip arthroplasty.  Note that the tip of the femoral stem is not included in the  field-of-view and if there are symptoms at this location, x-ray is  recommended to evaluate the tip of the femoral stem.  2. Degenerative disc disease and facet arthritis in the lower lumbar  spine.  3. Colonic diverticulosis without evidence for diverticulitis.     This report was finalized on 4/8/2024 11:03 PM by Dr. Lexa Pina M.D on Workstation: [id number]       CT Head Without Contrast [[id number]] Collected: 04/08/24 2252     Updated: 04/08/24 2252    Narrative:        Patient: RAMIRO CASEY  Time Out: 22:51  Exam(s): CT HEAD Without Contrast     EXAM:    CT Head Without Intravenous Contrast    CLINICAL HISTORY:     Reason for exam: fall, head injury, dizziness.    TECHNIQUE:    Axial computed tomography images of the head brain without intravenous   contrast.  This CT exam was performed according to the principle of ALARA   (As Low As Reasonably Achievable) by using one or more of the following   dose reduction techniques: automated exposure control, adjustment of the   mA and or kV according to patient size, and or use of iterative   reconstruction technique.    COMPARISON:    No relevant prior studies available.    FINDINGS:  No acute intracranial hemorrhage.  No midline shift or mass effect.     The territorial gray-white matter differentiation is maintained   throughout.     Age-related cerebral volume loss.  Periventricular and subcortical white   matter hypoattenuation, consistent with chronic microangiopathy.     The visualized orbits appear grossly unremarkable.    The calvarium is intact.    The visualized paranasal sinuses and mastoid air  cells are grossly clear.    IMPRESSION:  No acute intracranial hemorrhage, midline shift, or mass effect.      Impression:          Electronically signed by Jason Whitaker MD on 04-08-24 at 2251    CT Cervical Spine Without Contrast [[id number]] Collected: 04/08/24 2251     Updated: 04/08/24 2251    Narrative:        Patient: RAMIRO CASEY  Time Out: 22:50  Exam(s): CT C SPINE     EXAM:    CT Cervical Spine Without Intravenous Contrast    CLINICAL HISTORY:     Reason for exam: fall, neck pain.    TECHNIQUE:    Axial computed tomography images of the cervical spine without   intravenous contrast.  This CT exam was performed according to the   principle of ALARA (As Low As Reasonably Achievable) by using one or more   of the following dose reduction techniques: automated exposure control,   adjustment of the mA and or kV according to patient size, and or use of   iterative reconstruction technique.    COMPARISON:    No relevant prior studies available.    FINDINGS:   The vertebral body heights are maintained. The craniocervical junction is   intact. The atlanto-dens interval is maintained. The dens is intact.    There is no spondylolisthesis.      ACDF extending from C3-C7.  The plate is well seated.  No CT evidence of   hardware complication.    Multilevel cervical spondylosis and degenerative disc disease.   Straightening of the cervical lordosis.     The unenhanced neck soft tissues are grossly unremarkable.  The   visualized lung apices are grossly clear.    IMPRESSION:  No acute fracture or subluxation of the cervical spine.    ACDF extending from C3-C7.  The plate is well seated.  No CT evidence of   hardware complication.      Impression:          Electronically signed by Jason Whitaker MD on 04-08-24 at 2250    XR Chest 1 View [[id number]] Collected: 04/08/24 2229     Updated: 04/08/24 2236    Narrative:      CHEST SINGLE VIEW     HISTORY: Weakness and dizziness.     COMPARISON: AP chest 02/10/2024.     FINDINGS:  "There is elevation of the right hemidiaphragm. Heart size  appears within normal limits. Lungs are clear of focal airspace disease  and there is no evidence for pulmonary edema. However, there is blunting  of the costophrenic angle suspected to be due to small pleural  effusions. Cervical spine plate and screws are noted. Cardiac monitoring  leads are noted. The bones are osteopenic.       Impression:      Blunting of the costophrenic angles suspected be due to  small pleural effusions. There is no further evidence for active disease  in the chest.     This report was finalized on 4/8/2024 10:33 PM by Dr. Lexa Pina M.D on Workstation: [id number]        Pertinent Labs     Results from last 7 days   Lab Units 04/09/24 0449 04/08/24 2118   WBC 10*3/mm3 6.87 7.34   HEMOGLOBIN g/dL 10.1* 10.3*   PLATELETS 10*3/mm3 174 166     Results from last 7 days   Lab Units 04/09/24 0449 04/08/24 2118   SODIUM mmol/L 142 141   POTASSIUM mmol/L 4.2 4.6   CHLORIDE mmol/L 110* 110*   CO2 mmol/L 22.0 23.0   BUN mg/dL 20 23   CREATININE mg/dL 0.91 0.97   GLUCOSE mg/dL 114* 110*   EGFR mL/min/1.73 82.6 76.5     Results from last 7 days   Lab Units 04/08/24 2118   ALBUMIN g/dL 3.5   BILIRUBIN mg/dL 0.2   ALK PHOS U/L 47   AST (SGOT) U/L 19   ALT (SGPT) U/L 24     Results from last 7 days   Lab Units 04/09/24 0449 04/08/24 2118   CALCIUM mg/dL 8.1* 7.8*   ALBUMIN g/dL  --  3.5   MAGNESIUM mg/dL  --  2.1       Results from last 7 days   Lab Units 04/09/24  0655 04/09/24 0449 04/09/24  0019 04/08/24 2118   HSTROP T ng/L 39* 39* 46* 40*           Invalid input(s): \"LDLCALC\"          Test Results Pending at Discharge       Discharge Details        Discharge Medications        New Medications        Instructions Start Date   sodium chloride 7 % nebulizer solution nebulizer solution   4 mL, Nebulization, Every 4 Hours PRN             Changes to Medications        Instructions Start Date   tiZANidine 2 MG tablet  Commonly known " as: ZANAFLEX  What changed: when to take this   2 mg, Oral, Nightly PRN             Continue These Medications        Instructions Start Date   acetaminophen 325 MG tablet  Commonly known as: TYLENOL   650 mg, Oral, Every 4 Hours PRN      albuterol (2.5 MG/3ML) 0.083% nebulizer solution  Commonly known as: PROVENTIL   2.5 mg, Nebulization, Every 6 Hours PRN      calcium carbonate 500 MG chewable tablet  Commonly known as: TUMS   1 tablet, Oral, As Needed      dicyclomine 10 MG capsule  Commonly known as: BENTYL   10 mg, Oral, 3 Times Daily PRN      FLUoxetine 20 MG capsule  Commonly known as: PROzac   TAKE 1 CAPSULE DAILY      gabapentin 300 MG capsule  Commonly known as: NEURONTIN   300 mg, Oral, 3 Times Daily      guaiFENesin 600 MG 12 hr tablet  Commonly known as: MUCINEX   600 mg, Oral, 2 Times Daily PRN      Multivitamin Adult tablet tablet  Generic drug: multivitamin with minerals   1 tablet, Oral, Daily      ondansetron ODT 4 MG disintegrating tablet  Commonly known as: ZOFRAN-ODT   4 mg, Translingual, Every 6 Hours PRN      pantoprazole 40 MG EC tablet  Commonly known as: PROTONIX   40 mg, Oral, Daily      Umeclidinium-Vilanterol 62.5-25 MCG/ACT aerosol powder  inhaler  Commonly known as: ANORO ELLIPTA   1 puff, Inhalation, Daily - RT             Stop These Medications      guaiFENesin-dextromethorphan 100-10 MG/5ML syrup  Commonly known as: ROBITUSSIN DM              Allergies   Allergen Reactions    Daliresp [Roflumilast] Anaphylaxis    Latex Rash    Sulfa Antibiotics Rash       Discharge Disposition:  Skilled Nursing Facility (DC - External)      Discharge Diet:  Diet Order   Procedures    Diet: Regular/House; Fluid Consistency: Thin (IDDSI 0)       Discharge Activity:   Activity Instructions       Activity as Tolerated              CODE STATUS:    Code Status and Medical Interventions:   Ordered at: 04/09/24 0242     Code Status (Patient has no pulse and is not breathing):    CPR (Attempt to Resuscitate)      Medical Interventions (Patient has pulse or is breathing):    Full Support       Future Appointments   Date Time Provider Department Center   4/18/2024 10:00 AM [facility]   4/23/2024  2:30 PM Harpreet Kate MD [facility]     Additional Instructions for the Follow-ups that You Need to Schedule       Discharge Follow-up with PCP   As directed       Currently Documented PCP:    Alfonso Goldstein MD    PCP Phone Number:    [phone]     Follow Up Details: 1 to 2 weeks (or sooner if problems)               Contact information for follow-up providers       Alfonso Goldstein MD .    Specialty: Family Medicine  Why: 1 to 2 weeks (or sooner if problems)  Contact information:  [facility] [address] [address]  [phone]                       Contact information for after-discharge care       Destination       [facility] .    Service: Nursing Home  Contact information:  [facility] [address] [phone]                                   Additional Instructions for the Follow-ups that You Need to Schedule       Discharge Follow-up with PCP   As directed       Currently Documented PCP:    Alfonso Goldstein MD    PCP Phone Number:    [phone]     Follow Up Details: 1 to 2 weeks (or sooner if problems)            Time Spent on Discharge:  Greater than 30 minutes      Joe Bryant MD  [facility]  04/09/24  16:56 EDT

## 2024-04-18 ENCOUNTER — HOSPITAL ENCOUNTER (OUTPATIENT)
Facility: HOSPITAL | Age: 84
Setting detail: OBSERVATION
Discharge: HOME | End: 2024-04-20
Attending: EMERGENCY MEDICINE | Admitting: EMERGENCY MEDICINE
Payer: MEDICARE

## 2024-04-18 ENCOUNTER — APPOINTMENT (OUTPATIENT)
Dept: GENERAL RADIOLOGY | Facility: HOSPITAL | Age: 84
End: 2024-04-18
Payer: MEDICARE

## 2024-04-18 DIAGNOSIS — R07.9 CHEST PAIN, UNSPECIFIED TYPE: Primary | ICD-10-CM

## 2024-04-18 DIAGNOSIS — K21.9 GASTROESOPHAGEAL REFLUX DISEASE WITHOUT ESOPHAGITIS: ICD-10-CM

## 2024-04-18 DIAGNOSIS — R79.89 ELEVATED TROPONIN: ICD-10-CM

## 2024-04-18 PROCEDURE — 93010 ELECTROCARDIOGRAM REPORT: CPT | Performed by: INTERNAL MEDICINE

## 2024-04-18 PROCEDURE — 96375 TX/PRO/DX INJ NEW DRUG ADDON: CPT

## 2024-04-18 PROCEDURE — 85025 COMPLETE CBC W/AUTO DIFF WBC: CPT | Performed by: PHYSICIAN ASSISTANT

## 2024-04-18 PROCEDURE — 80053 COMPREHEN METABOLIC PANEL: CPT | Performed by: PHYSICIAN ASSISTANT

## 2024-04-18 PROCEDURE — 84484 ASSAY OF TROPONIN QUANT: CPT | Performed by: PHYSICIAN ASSISTANT

## 2024-04-18 PROCEDURE — 25010000002 ONDANSETRON PER 1 MG: Performed by: PHYSICIAN ASSISTANT

## 2024-04-18 PROCEDURE — 93005 ELECTROCARDIOGRAM TRACING: CPT | Performed by: PHYSICIAN ASSISTANT

## 2024-04-18 PROCEDURE — 99285 EMERGENCY DEPT VISIT HI MDM: CPT

## 2024-04-18 PROCEDURE — 96374 THER/PROPH/DIAG INJ IV PUSH: CPT

## 2024-04-18 RX ORDER — ONDANSETRON 2 MG/ML
4 INJECTION INTRAMUSCULAR; INTRAVENOUS ONCE
Status: COMPLETED | OUTPATIENT
Start: 2024-04-19 | End: 2024-04-18

## 2024-04-18 RX ORDER — ALUMINA, MAGNESIA, AND SIMETHICONE 2400; 2400; 240 MG/30ML; MG/30ML; MG/30ML
15 SUSPENSION ORAL ONCE
Status: COMPLETED | OUTPATIENT
Start: 2024-04-19 | End: 2024-04-18

## 2024-04-18 RX ORDER — FAMOTIDINE 10 MG/ML
20 INJECTION, SOLUTION INTRAVENOUS ONCE
Status: COMPLETED | OUTPATIENT
Start: 2024-04-19 | End: 2024-04-18

## 2024-04-18 RX ADMIN — ONDANSETRON 4 MG: 2 INJECTION INTRAMUSCULAR; INTRAVENOUS at 23:55

## 2024-04-18 RX ADMIN — FAMOTIDINE 20 MG: 10 INJECTION INTRAVENOUS at 23:54

## 2024-04-18 RX ADMIN — ALUMINUM HYDROXIDE, MAGNESIUM HYDROXIDE, AND DIMETHICONE 15 ML: 400; 400; 40 SUSPENSION ORAL at 23:54

## 2024-04-19 ENCOUNTER — APPOINTMENT (OUTPATIENT)
Dept: GENERAL RADIOLOGY | Facility: HOSPITAL | Age: 84
End: 2024-04-19
Payer: MEDICARE

## 2024-04-19 PROBLEM — R07.9 CHEST PAIN: Status: ACTIVE | Noted: 2024-04-19

## 2024-04-19 LAB
ALBUMIN SERPL-MCNC: 3.6 G/DL (ref 3.5–5.2)
ALBUMIN/GLOB SERPL: 1.9 G/DL
ALP SERPL-CCNC: 58 U/L (ref 39–117)
ALT SERPL W P-5'-P-CCNC: 22 U/L (ref 1–41)
ANION GAP SERPL CALCULATED.3IONS-SCNC: 8.5 MMOL/L (ref 5–15)
AST SERPL-CCNC: 17 U/L (ref 1–40)
BASOPHILS # BLD AUTO: 0 10*3/MM3 (ref 0–0.2)
BASOPHILS NFR BLD AUTO: 0 % (ref 0–1.5)
BILIRUB SERPL-MCNC: 0.3 MG/DL (ref 0–1.2)
BUN SERPL-MCNC: 28 MG/DL (ref 8–23)
BUN/CREAT SERPL: 27.7 (ref 7–25)
CALCIUM SPEC-SCNC: 8.7 MG/DL (ref 8.6–10.5)
CHLORIDE SERPL-SCNC: 109 MMOL/L (ref 98–107)
CK SERPL-CCNC: 42 U/L (ref 20–200)
CO2 SERPL-SCNC: 22.5 MMOL/L (ref 22–29)
CREAT SERPL-MCNC: 1.01 MG/DL (ref 0.76–1.27)
DEPRECATED RDW RBC AUTO: 54.6 FL (ref 37–54)
DEPRECATED RDW RBC AUTO: 55 FL (ref 37–54)
EGFRCR SERPLBLD CKD-EPI 2021: 72.9 ML/MIN/1.73
EOSINOPHIL # BLD AUTO: 0 10*3/MM3 (ref 0–0.4)
EOSINOPHIL NFR BLD AUTO: 0 % (ref 0.3–6.2)
ERYTHROCYTE [DISTWIDTH] IN BLOOD BY AUTOMATED COUNT: 17.4 % (ref 12.3–15.4)
ERYTHROCYTE [DISTWIDTH] IN BLOOD BY AUTOMATED COUNT: 17.4 % (ref 12.3–15.4)
GEN 5 2HR TROPONIN T REFLEX: 47 NG/L
GLOBULIN UR ELPH-MCNC: 1.9 GM/DL
GLUCOSE SERPL-MCNC: 109 MG/DL (ref 65–99)
HCT VFR BLD AUTO: 30.8 % (ref 37.5–51)
HCT VFR BLD AUTO: 31.8 % (ref 37.5–51)
HGB BLD-MCNC: 10.3 G/DL (ref 13–17.7)
HGB BLD-MCNC: 9.8 G/DL (ref 13–17.7)
HOLD SPECIMEN: NORMAL
IMM GRANULOCYTES # BLD AUTO: 0.05 10*3/MM3 (ref 0–0.05)
IMM GRANULOCYTES NFR BLD AUTO: 0.6 % (ref 0–0.5)
LYMPHOCYTES # BLD AUTO: 1.34 10*3/MM3 (ref 0.7–3.1)
LYMPHOCYTES NFR BLD AUTO: 16.6 % (ref 19.6–45.3)
MCH RBC QN AUTO: 27.8 PG (ref 26.6–33)
MCH RBC QN AUTO: 27.8 PG (ref 26.6–33)
MCHC RBC AUTO-ENTMCNC: 31.8 G/DL (ref 31.5–35.7)
MCHC RBC AUTO-ENTMCNC: 32.4 G/DL (ref 31.5–35.7)
MCV RBC AUTO: 85.9 FL (ref 79–97)
MCV RBC AUTO: 87.5 FL (ref 79–97)
MONOCYTES # BLD AUTO: 0.71 10*3/MM3 (ref 0.1–0.9)
MONOCYTES NFR BLD AUTO: 8.8 % (ref 5–12)
NEUTROPHILS NFR BLD AUTO: 5.96 10*3/MM3 (ref 1.7–7)
NEUTROPHILS NFR BLD AUTO: 74 % (ref 42.7–76)
NRBC BLD AUTO-RTO: 0 /100 WBC (ref 0–0.2)
PLATELET # BLD AUTO: 189 10*3/MM3 (ref 140–450)
PLATELET # BLD AUTO: 198 10*3/MM3 (ref 140–450)
PMV BLD AUTO: 9 FL (ref 6–12)
PMV BLD AUTO: 9.1 FL (ref 6–12)
POTASSIUM SERPL-SCNC: 4.4 MMOL/L (ref 3.5–5.2)
PROT SERPL-MCNC: 5.5 G/DL (ref 6–8.5)
QT INTERVAL: 362 MS
QTC INTERVAL: 416 MS
RBC # BLD AUTO: 3.52 10*6/MM3 (ref 4.14–5.8)
RBC # BLD AUTO: 3.7 10*6/MM3 (ref 4.14–5.8)
SODIUM SERPL-SCNC: 140 MMOL/L (ref 136–145)
TROPONIN T DELTA: -15 NG/L
TROPONIN T SERPL HS-MCNC: 46 NG/L
TROPONIN T SERPL HS-MCNC: 62 NG/L
WBC NRBC COR # BLD AUTO: 8.06 10*3/MM3 (ref 3.4–10.8)
WBC NRBC COR # BLD AUTO: 8.12 10*3/MM3 (ref 3.4–10.8)
WHOLE BLOOD HOLD COAG: NORMAL

## 2024-04-19 PROCEDURE — 84484 ASSAY OF TROPONIN QUANT: CPT | Performed by: NURSE PRACTITIONER

## 2024-04-19 PROCEDURE — 71045 X-RAY EXAM CHEST 1 VIEW: CPT

## 2024-04-19 PROCEDURE — G0378 HOSPITAL OBSERVATION PER HR: HCPCS

## 2024-04-19 PROCEDURE — 97530 THERAPEUTIC ACTIVITIES: CPT

## 2024-04-19 PROCEDURE — 94799 UNLISTED PULMONARY SVC/PX: CPT

## 2024-04-19 PROCEDURE — 84484 ASSAY OF TROPONIN QUANT: CPT | Performed by: PHYSICIAN ASSISTANT

## 2024-04-19 PROCEDURE — 85027 COMPLETE CBC AUTOMATED: CPT | Performed by: NURSE PRACTITIONER

## 2024-04-19 PROCEDURE — 82550 ASSAY OF CK (CPK): CPT | Performed by: NURSE PRACTITIONER

## 2024-04-19 PROCEDURE — 94761 N-INVAS EAR/PLS OXIMETRY MLT: CPT

## 2024-04-19 PROCEDURE — 99214 OFFICE O/P EST MOD 30 MIN: CPT | Performed by: INTERNAL MEDICINE

## 2024-04-19 PROCEDURE — 94640 AIRWAY INHALATION TREATMENT: CPT

## 2024-04-19 PROCEDURE — 99213 OFFICE O/P EST LOW 20 MIN: CPT | Performed by: INTERNAL MEDICINE

## 2024-04-19 PROCEDURE — 94664 DEMO&/EVAL PT USE INHALER: CPT

## 2024-04-19 PROCEDURE — 97162 PT EVAL MOD COMPLEX 30 MIN: CPT

## 2024-04-19 PROCEDURE — 36415 COLL VENOUS BLD VENIPUNCTURE: CPT

## 2024-04-19 RX ORDER — GABAPENTIN 300 MG/1
300 CAPSULE ORAL 3 TIMES DAILY
Status: DISCONTINUED | OUTPATIENT
Start: 2024-04-19 | End: 2024-04-20 | Stop reason: HOSPADM

## 2024-04-19 RX ORDER — ARFORMOTEROL TARTRATE 15 UG/2ML
15 SOLUTION RESPIRATORY (INHALATION)
Status: DISCONTINUED | OUTPATIENT
Start: 2024-04-19 | End: 2024-04-20 | Stop reason: HOSPADM

## 2024-04-19 RX ORDER — SODIUM CHLORIDE 9 MG/ML
40 INJECTION, SOLUTION INTRAVENOUS AS NEEDED
Status: DISCONTINUED | OUTPATIENT
Start: 2024-04-19 | End: 2024-04-20 | Stop reason: HOSPADM

## 2024-04-19 RX ORDER — CALCIUM CARBONATE 500 MG/1
1 TABLET, CHEWABLE ORAL EVERY 6 HOURS PRN
Status: DISCONTINUED | OUTPATIENT
Start: 2024-04-19 | End: 2024-04-20 | Stop reason: HOSPADM

## 2024-04-19 RX ORDER — FLUOXETINE HYDROCHLORIDE 20 MG/1
20 CAPSULE ORAL DAILY
Status: DISCONTINUED | OUTPATIENT
Start: 2024-04-19 | End: 2024-04-20 | Stop reason: HOSPADM

## 2024-04-19 RX ORDER — ASPIRIN 81 MG/1
324 TABLET, CHEWABLE ORAL ONCE
Status: COMPLETED | OUTPATIENT
Start: 2024-04-19 | End: 2024-04-19

## 2024-04-19 RX ORDER — PANTOPRAZOLE SODIUM 40 MG/1
40 TABLET, DELAYED RELEASE ORAL DAILY
Status: DISCONTINUED | OUTPATIENT
Start: 2024-04-19 | End: 2024-04-19

## 2024-04-19 RX ORDER — SUCRALFATE 1 G/1
1 TABLET ORAL
Status: DISCONTINUED | OUTPATIENT
Start: 2024-04-19 | End: 2024-04-20 | Stop reason: HOSPADM

## 2024-04-19 RX ORDER — SODIUM CHLORIDE 0.9 % (FLUSH) 0.9 %
10 SYRINGE (ML) INJECTION AS NEEDED
Status: DISCONTINUED | OUTPATIENT
Start: 2024-04-19 | End: 2024-04-20 | Stop reason: HOSPADM

## 2024-04-19 RX ORDER — ONDANSETRON 2 MG/ML
4 INJECTION INTRAMUSCULAR; INTRAVENOUS EVERY 6 HOURS PRN
Status: DISCONTINUED | OUTPATIENT
Start: 2024-04-19 | End: 2024-04-20 | Stop reason: HOSPADM

## 2024-04-19 RX ORDER — DICYCLOMINE HYDROCHLORIDE 10 MG/1
10 CAPSULE ORAL 3 TIMES DAILY PRN
Status: DISCONTINUED | OUTPATIENT
Start: 2024-04-19 | End: 2024-04-20 | Stop reason: HOSPADM

## 2024-04-19 RX ORDER — PANTOPRAZOLE SODIUM 40 MG/1
40 TABLET, DELAYED RELEASE ORAL
Status: DISCONTINUED | OUTPATIENT
Start: 2024-04-19 | End: 2024-04-20 | Stop reason: HOSPADM

## 2024-04-19 RX ORDER — ALBUTEROL SULFATE 2.5 MG/3ML
2.5 SOLUTION RESPIRATORY (INHALATION) EVERY 6 HOURS PRN
Status: DISCONTINUED | OUTPATIENT
Start: 2024-04-19 | End: 2024-04-20 | Stop reason: HOSPADM

## 2024-04-19 RX ORDER — ALUMINA, MAGNESIA, AND SIMETHICONE 2400; 2400; 240 MG/30ML; MG/30ML; MG/30ML
15 SUSPENSION ORAL EVERY 6 HOURS PRN
Status: DISCONTINUED | OUTPATIENT
Start: 2024-04-19 | End: 2024-04-20 | Stop reason: HOSPADM

## 2024-04-19 RX ORDER — ONDANSETRON 4 MG/1
4 TABLET, ORALLY DISINTEGRATING ORAL EVERY 6 HOURS PRN
Status: DISCONTINUED | OUTPATIENT
Start: 2024-04-19 | End: 2024-04-20 | Stop reason: HOSPADM

## 2024-04-19 RX ORDER — SODIUM CHLORIDE 0.9 % (FLUSH) 0.9 %
10 SYRINGE (ML) INJECTION EVERY 12 HOURS SCHEDULED
Status: DISCONTINUED | OUTPATIENT
Start: 2024-04-19 | End: 2024-04-20 | Stop reason: HOSPADM

## 2024-04-19 RX ADMIN — PANTOPRAZOLE SODIUM 40 MG: 40 TABLET, DELAYED RELEASE ORAL at 08:04

## 2024-04-19 RX ADMIN — ARFORMOTEROL TARTRATE 15 MCG: 15 SOLUTION RESPIRATORY (INHALATION) at 19:48

## 2024-04-19 RX ADMIN — Medication 10 ML: at 20:24

## 2024-04-19 RX ADMIN — GABAPENTIN 300 MG: 300 CAPSULE ORAL at 20:22

## 2024-04-19 RX ADMIN — FLUOXETINE HYDROCHLORIDE 20 MG: 20 CAPSULE ORAL at 08:04

## 2024-04-19 RX ADMIN — SUCRALFATE 1 G: 1 TABLET ORAL at 17:15

## 2024-04-19 RX ADMIN — ARFORMOTEROL TARTRATE 15 MCG: 15 SOLUTION RESPIRATORY (INHALATION) at 06:56

## 2024-04-19 RX ADMIN — GABAPENTIN 300 MG: 300 CAPSULE ORAL at 08:04

## 2024-04-19 RX ADMIN — ASPIRIN 324 MG: 81 TABLET, CHEWABLE ORAL at 03:07

## 2024-04-19 RX ADMIN — TIOTROPIUM BROMIDE INHALATION SPRAY 2 PUFF: 3.12 SPRAY, METERED RESPIRATORY (INHALATION) at 07:01

## 2024-04-19 RX ADMIN — SUCRALFATE 1 G: 1 TABLET ORAL at 20:22

## 2024-04-19 RX ADMIN — GABAPENTIN 300 MG: 300 CAPSULE ORAL at 17:15

## 2024-04-19 RX ADMIN — Medication 10 ML: at 08:05

## 2024-04-19 RX ADMIN — PANTOPRAZOLE SODIUM 40 MG: 40 TABLET, DELAYED RELEASE ORAL at 17:15

## 2024-04-19 NOTE — CASE MANAGEMENT/SOCIAL WORK
Discharge Planning Assessment  [facility]     Patient Name: Tony Leonard  MRN: [id number]  Today's Date: 4/19/2024    Admit Date: 4/18/2024    Plan: Return to [facility] - may need transport   Discharge Needs Assessment       Row Name 04/19/24 1111       Living Environment    People in Home facility resident    Current Living Arrangements extended care facility    Provides Primary Care For no one, unable/limited ability to care for self    Family Caregiver if Needed child(brian), adult    Able to Return to Prior Arrangements yes       Transition Planning    Patient/Family Anticipates Transition to long-term care facility    Transportation Anticipated health plan transportation       Discharge Needs Assessment    Discharge Facility/Level of Care Needs nursing facility, intermediate                   Discharge Plan       Row Name 04/19/24 1112       Plan    Plan Return to [facility] - may need transport    Plan Comments CCP spoke with patient at bedside; explained role, verified facesheet, and discussed dc plan. Patient was admitted from [facility] and states the plan is to return there at discharge. CCP spoke with Pavithra/[facility] who states patient is from a [facility] medicaid bed hold and can return when medically stable. [facility] is able to transport patient back if he discharges today. If patient discharges over the weekend, he will need a wheelchair van. Pharmacy updated. JUSTYN, CSW                  Continued Care and Services - Admitted Since 4/18/2024       Destination Coordination complete.      Service Provider Request Status Selected Services Address Phone Fax Patient Preferred    [facility]  Selected Nursing Home [address] [place] [address] [phone] --                  Selected Continued Care - Prior Encounters Includes continued care and service providers with selected services from prior encounters from 1/19/2024 to 4/19/2024       Discharged on 4/9/2024 Admission date: 4/8/2024 - Discharge disposition: Skilled Nursing Facility (DC - External)      Destination       Service Provider Selected Services Address Phone Fax Patient Preferred    [facility] [address] [facility] [address] [phone] --                      Discharged on 2/14/2024 Admission date: 2/10/2024 - Discharge disposition: Home-Health Care Svc      Home Medical Care       Service Provider Selected Services Address Phone Fax Patient Preferred    Critical access hospital [facility] ,  Home Rehabilitation [address] [facility], [facility] [address] [id number] [phone] --                             Demographic Summary       Row Name 04/19/24 1110       General Information    Admission Type observation    Referral Source admission list    Reason for Consult discharge planning    Preferred Language English                   Functional Status       Row Name 04/19/24 1111       Functional Status    Usual Activity Tolerance fair    Current Activity Tolerance fair       Functional Status, IADL    Medications assistive equipment and person    Meal Preparation assistive equipment and person    Housekeeping assistive equipment and person    Laundry assistive equipment and person    Shopping assistive equipment and person       Mental Status    General Appearance WDL WDL                   Psychosocial    No documentation.                  Abuse/Neglect    No documentation.                  Legal    No documentation.                  Substance Abuse    No documentation.                  Patient Forms    No documentation.                     RUTHIE Gutierrez

## 2024-04-19 NOTE — PLAN OF CARE
Goal Outcome Evaluation:      Pt admitted to obs for further evaluation of epigastric pain. Pt denies chest pain. Continue to trend troponin. Cardiology consulted.

## 2024-04-19 NOTE — ED PROVIDER NOTES
"Date seen: 4/19/2024        History provided by: Patient        Chief Complaints: Acid reflux    HPI:  Pt is a 83 y.o. male with history of GERD, COPD, CHF, hypertension, atrial fibrillation, anemia, who presents for acid reflux.  Patient reports history of GERD and states he takes Tums but Tums has not been helping recently.  States he has been experiencing acid reflux over the past 5 days, worsens after eating and at nighttime.  States tonight after dinner reflux worsened and he experienced an episode of vomiting which prompted him to the ED.  Describes pain as \"esophageal burning.\"  Denies cardiac pain, shortness of breath, diaphoresis, radiation of pain, back pain, abdominal pain, hematemesis, fever, cough.  Denies recent illness.          Medical Record Review:  Patient was admitted on 4/8/2024 for dizziness with fall.  Evaluated by physical therapy, ambulatory with walker, dizziness resolved labs and imaging stable.  Troponin mildly elevated, patient had cardiac workup within the last 4 months with negative echocardiogram and stress test. Was not experiencing CP, no further work-up indicated then.    Reviewed transthoracic echocardiogram from 1/16/2024, interpretation summary:    Left ventricular ejection fraction appears to be 56 - 60%.    Left ventricular diastolic function was normal.    Moderate dilation of the aortic root is present. Mild dilation of the sinuses of Valsalva is present.    Reviewed cardiac stress test from 12/22/2023, interpretation summary:    Findings consistent with an equivocal ECG stress test.    Left ventricular ejection fraction is normal (Calculated EF = 60%).    Myocardial perfusion imaging indicates a normal myocardial perfusion study with no evidence of ischemia.    Impressions are consistent with a low risk study.      PAST MEDICAL HISTORY  Active Ambulatory Problems     Diagnosis Date Noted    Thrush, oral 03/11/2016    ADD (attention deficit disorder) 03/11/2016    " Hemorrhoids 03/11/2016    Bronchiectasis with acute lower respiratory infection 03/11/2016    Diverticul disease small and large intestine, no perforati or abscess 03/11/2016    Benign non-nodular prostatic hyperplasia without lower urinary tract symptoms 03/11/2016    Gastroesophageal reflux disease 03/11/2016    Malaise and fatigue 03/11/2016    Environmental allergies 04/25/2016    Hemoptysis 04/27/2016    Dyslipidemia 05/11/2016    Primary osteoarthritis involving multiple joints 05/11/2016    Pneumonia of right lower lobe due to infectious organism 10/03/2016    Abnormal EKG 10/04/2016    COPD (chronic obstructive pulmonary disease) 10/04/2016    Mild malnutrition 03/28/2017    Acute on chronic respiratory failure with hypoxia 04/27/2017    Fracture, intertrochanteric, right femur, closed, initial encounter 01/16/2018    Chronic diastolic CHF (congestive heart failure) 01/16/2018    Hematoma of frontal scalp 01/16/2018    Postoperative anemia due to acute blood loss 01/19/2018    Urinary retention 01/25/2018    Hemorrhagic disorder due to circulating anticoagulants 01/29/2018    History of fracture of right hip 02/22/2018    Closed fracture of right hip with routine healing 02/28/2018    Chronic low back pain with sciatica 06/21/2018    Change in bowel function 04/18/2019    Rectal bleeding 04/18/2019    Prostate cancer 04/22/2019    On home oxygen therapy 09/24/2019    Acute viral bronchitis 12/29/2019    Peripheral neuropathy 07/01/2021    Plantar fasciitis 09/08/2021    HTN (hypertension) 05/06/2022    COPD exacerbation 05/07/2022    Bilateral lower extremity edema 05/07/2022    Microscopic hematuria 05/08/2022    Acute midline low back pain with right-sided sciatica 08/01/2022    Spinal stenosis, lumbar region with neurogenic claudication 08/02/2022    Compression deformity of vertebra 08/02/2022    Rectal bleed 09/23/2022    Esophagitis 09/24/2022    Angiectasia 09/27/2022    Hiatal hernia 09/27/2022     Overweight (BMI 25.0-29.9) 11/14/2022    Leukocytosis 11/15/2022    Bilateral hip pain 11/21/2022    Elevated troponin level not due myocardial infarction 12/10/2022    Nocturnal hypoxia 12/10/2022    Pneumonia of right upper lobe due to infectious organism 12/29/2022    NSTEMI (non-ST elevated myocardial infarction) 01/29/2023    Chronic respiratory failure with hypoxia 01/29/2023    Paroxysmal atrial fibrillation 02/17/2023    Acute exacerbation of chronic obstructive pulmonary disease (COPD) 05/10/2023    Anemia 05/10/2023    Non-compliant patient 05/11/2023    Hypokalemia 10/01/2020    Spondylolisthesis of lumbar region 08/14/2018    Elevated troponin 12/22/2023    Rhabdomyolysis 12/26/2023    Multiple contusions 12/26/2023    Fall 12/26/2023    Dizziness 04/09/2024    Contusion of hip 04/09/2024     Resolved Ambulatory Problems     Diagnosis Date Noted    Pneumonia of both lower lobes due to infectious organism 03/11/2016    Pneumonia of right upper lobe due to infectious organism 04/22/2016    COPD exacerbation 04/25/2016    GERD (gastroesophageal reflux disease) 04/25/2016    Chronic respiratory failure with hypoxia 10/04/2016    Bacterial lobar pneumonia 12/27/2016    Pneumonia of left lower lobe due to infectious organism 03/26/2017    Bronchitis 06/01/2017    COPD exacerbation 06/17/2017    Leukocytosis 01/16/2018    Right upper lobe pneumonia 01/20/2018    Mucus plugging of bronchi 01/16/2018    COPD exacerbation 04/16/2018    Degenerative joint disease (DJD) of hip 09/23/2019    Bronchiectasis with (acute) exacerbation 12/28/2019    Septic shock 11/26/2022    Acute saddle pulmonary embolism without acute cor pulmonale  - 12/11/2022 12/11/2022     Past Medical History:   Diagnosis Date    ADHD (attention deficit hyperactivity disorder)     Bronchiectasis     CHF (congestive heart failure)     Colon polyp     Diverticulosis     Hard of hearing     Pneumonia          PAST SURGICAL HISTORY  Past Surgical  History:   Procedure Laterality Date    BRONCHOSCOPY N/A 4/30/2016    Procedure: BRONCHOSCOPY with BAL of right lower lobe and left  lower lobe.  ;  Surgeon: Nando Diaz MD;  Location: [facility] ENDOSCOPY;  Service:     BRONCHOSCOPY N/A 4/28/2017    Procedure: BRONCHOSCOPY;  Surgeon: Katharina Salter MD;  [facility] END[facility];  Service:     BRONCHOSCOPY Bilateral 6/29/2017    Procedure: BRONCHOSCOPY WITH WASHINGS;  Surgeon: Katharina Salter MD;  Location: [facility] ENDOSCOPY;  Service:     BRONCHOSCOPY N/A 1/22/2018    Procedure: BRONCHOSCOPY AT BEDSIDE with BAL;  Surgeon: Katharina Salter MD;  Location[facility] END[facility];  Service:     BRONCHOSCOPY N/A 1/30/2018    Procedure: BRONCHOSCOPY with BAL and washing;  Surgeon: Shreyas Wild MD;  Location: [facility] ENDOSCOPY;  Service:     BRONCHOSCOPY Bilateral 4/24/2018    Procedure: BRONCHOSCOPY WITH WASHING;  Surgeon: Katharina Salter MD;  Location: [facility] ENDOSCOPY;  Service: Pulmonary    BRONCHOSCOPY N/A 12/28/2019    Procedure: BRONCHOSCOPY with bilateral washing;  Surgeon: Katharina Salter MD;  Location: [facility] ENDOSCOPY;  Service: Pulmonary    BRONCHOSCOPY Bilateral 1/4/2023    Procedure: BRONCHOSCOPY with lavage;  Surgeon: Katharina Salter MD;  Location: [facility] ENDOSCOPY;  Service: Pulmonary;  Laterality: Bilateral;  mucus plugging    CARDIAC CATHETERIZATION N/A 1/29/2023    Procedure: Left Heart Cath;  Surgeon: Johnnie Ang MD;  Location: [facility] CATH INVASIVE LOCATION;  Service: Cardiology;  Laterality: N/A;    CARDIAC CATHETERIZATION N/A 1/29/2023    Procedure: Coronary angiography;  Surgeon: Johnnie Ang MD;  Location: [facility] CATH INVASIVE LOCATION;  Service: Cardiology;  Laterality: N/A;    COLONOSCOPY  05/17/2013    eh, ih, tort, sig tics    COLONOSCOPY N/A 5/10/2019    Non-thrombosed external hemorrhoids found on perianal exam, Diverticulosis, Tortuous colon, One 5 mm polyp in the mid ascending colon, IH. Path: Tubular adenoma.     COLONOSCOPY  N/A 9/24/2022    Procedure: COLONOSCOPY to cecum and TI with argon plasma coagulation.;  Surgeon: Bruce Black MD;  Location: [facility] ENDOSCOPY;  Service: Gastroenterology;  Laterality: N/A;  pre- GI bleeding  post- radiation proctitis, diverticulosis    ENDOSCOPY  03/19/2015    z line irreg, hh    ENDOSCOPY N/A 9/24/2022    Procedure: ESOPHAGOGASTRODUODENOSCOPY;  Surgeon: Bruce Black MD;  [facility];  Service: Gastroenterology;  Laterality: N/A;  pre- GI bleeding  post- esophagitis, hiatal hernia    HIP OPEN REDUCTION Right 1/17/2018    Procedure: HIP OPEN REDUCTION INTERNAL FIXATION WITH DYNAMIC HIP SCREW;  Surgeon: Les Black MD;  Location: [facility] MAIN OR;  Service:     SPINE SURGERY      TONSILLECTOMY      TOTAL HIP ARTHROPLASTY REVISION Right 9/23/2019    Procedure: HIP  REVISION RIGHT;  Surgeon: Isauro Warner MD;  Location: [facility] MAIN OR;  Service: Orthopedics         FAMILY HISTORY  Family History   Problem Relation Age of Onset    Thyroid disease Mother     No Known Problems Father     Malig Hyperthermia Neg Hx          SOCIAL HISTORY  Social History     Socioeconomic History    Marital status: Single   Tobacco Use    Smoking status: Never    Smokeless tobacco: Never   Vaping Use    Vaping status: Never Used   Substance and Sexual Activity    Alcohol use: No     Comment: red wine occassional    Drug use: No    Sexual activity: Defer         ALLERGIES  Daliresp [roflumilast], Latex, and Sulfa antibiotics            PHYSICAL EXAM  ED Triage Vitals [04/18/24 2331]   Temp Heart Rate Resp BP SpO2   98.4 °F (36.9 °C) 87 18 158/92 96 %      Temp src Heart Rate Source Patient Position BP Location FiO2 (%)   Tympanic Monitor Sitting Right arm --       Physical Exam  Vitals reviewed.   Constitutional:       General: He is not in acute distress.     Appearance: Normal appearance. He is not ill-appearing.   HENT:      Head: Normocephalic.      Nose: Nose normal.      Mouth/Throat:       Mouth: Mucous membranes are moist.   Eyes:      Extraocular Movements: Extraocular movements intact.      Conjunctiva/sclera: Conjunctivae normal.   Cardiovascular:      Rate and Rhythm: Normal rate and regular rhythm.      Pulses: Normal pulses.   Pulmonary:      Effort: Pulmonary effort is normal. No respiratory distress.      Breath sounds: Normal breath sounds.   Abdominal:      General: There is no distension.      Palpations: Abdomen is soft.      Tenderness: There is no abdominal tenderness. There is no guarding.      Comments: Dried emesis on shirt   Musculoskeletal:         General: No swelling or deformity. Normal range of motion.      Cervical back: Normal range of motion. No rigidity.      Right lower leg: No edema.      Left lower leg: No edema.   Skin:     General: Skin is warm.   Neurological:      General: No focal deficit present.      Mental Status: He is alert and oriented to person, place, and time.   Psychiatric:         Mood and Affect: Mood normal.         Vital signs and nursing notes reviewed.            Differential Diagnosis includes but not limited to: GERD, gastritis, ACS, aspiration pneumonia          LAB RESULTS  Recent Results (from the past 24 hour(s))   Comprehensive Metabolic Panel    Collection Time: 04/18/24 11:51 PM    Specimen: Blood   Result Value Ref Range    Glucose 109 (H) 65 - 99 mg/dL    BUN 28 (H) 8 - 23 mg/dL    Creatinine 1.01 0.76 - 1.27 mg/dL    Sodium 140 136 - 145 mmol/L    Potassium 4.4 3.5 - 5.2 mmol/L    Chloride 109 (H) 98 - 107 mmol/L    CO2 22.5 22.0 - 29.0 mmol/L    Calcium 8.7 8.6 - 10.5 mg/dL    Total Protein 5.5 (L) 6.0 - 8.5 g/dL    Albumin 3.6 3.5 - 5.2 g/dL    ALT (SGPT) 22 1 - 41 U/L    AST (SGOT) 17 1 - 40 U/L    Alkaline Phosphatase 58 39 - 117 U/L    Total Bilirubin 0.3 0.0 - 1.2 mg/dL    Globulin 1.9 gm/dL    A/G Ratio 1.9 g/dL    BUN/Creatinine Ratio 27.7 (H) 7.0 - 25.0    Anion Gap 8.5 5.0 - 15.0 mmol/L    eGFR 72.9 >60.0 mL/min/1.73   Single  High Sensitivity Troponin T    Collection Time: 04/18/24 11:51 PM    Specimen: Blood   Result Value Ref Range    HS Troponin T 62 (C) <22 ng/L   CBC Auto Differential    Collection Time: 04/18/24 11:51 PM    Specimen: Blood   Result Value Ref Range    WBC 8.06 3.40 - 10.80 10*3/mm3    RBC 3.70 (L) 4.14 - 5.80 10*6/mm3    Hemoglobin 10.3 (L) 13.0 - 17.7 g/dL    Hematocrit 31.8 (L) 37.5 - 51.0 %    MCV 85.9 79.0 - 97.0 fL    MCH 27.8 26.6 - 33.0 pg    MCHC 32.4 31.5 - 35.7 g/dL    RDW 17.4 (H) 12.3 - 15.4 %    RDW-SD 54.6 (H) 37.0 - 54.0 fl    MPV 9.0 6.0 - 12.0 fL    Platelets 198 140 - 450 10*3/mm3    Neutrophil % 74.0 42.7 - 76.0 %    Lymphocyte % 16.6 (L) 19.6 - 45.3 %    Monocyte % 8.8 5.0 - 12.0 %    Eosinophil % 0.0 (L) 0.3 - 6.2 %    Basophil % 0.0 0.0 - 1.5 %    Immature Grans % 0.6 (H) 0.0 - 0.5 %    Neutrophils, Absolute 5.96 1.70 - 7.00 10*3/mm3    Lymphocytes, Absolute 1.34 0.70 - 3.10 10*3/mm3    Monocytes, Absolute 0.71 0.10 - 0.90 10*3/mm3    Eosinophils, Absolute 0.00 0.00 - 0.40 10*3/mm3    Basophils, Absolute 0.00 0.00 - 0.20 10*3/mm3    Immature Grans, Absolute 0.05 0.00 - 0.05 10*3/mm3    nRBC 0.0 0.0 - 0.2 /100 WBC   ECG 12 Lead Chest Pain    Collection Time: 04/18/24 11:54 PM   Result Value Ref Range    QT Interval 362 ms    QTC Interval 416 ms   High Sensitivity Troponin T 2Hr    Collection Time: 04/19/24  1:51 AM    Specimen: Blood   Result Value Ref Range    HS Troponin T 47 (H) <22 ng/L    Troponin T Delta -15 (L) >=-4 - <+4 ng/L       Ordered the above labs and reviewed the results.          RADIOLOGY  XR Chest 1 View    Result Date: 4/19/2024  SINGLE VIEW OF THE CHEST  HISTORY: Congestive heart failure  COMPARISON: April 8, 2024  FINDINGS: There is cardiomegaly. There is no definite vascular congestion. There is elevation of the right hemidiaphragm, with associated bronchovascular crowding. No pneumothorax is seen. Bilateral effusions are suspected. There are background changes of COPD.       Stable findings when compared to April 8, 2024.  This report was finalized on 4/19/2024 12:53 AM by Dr. Danielle Arango M.D on Workstation: [id number]       Ordered the above noted radiological studies. Reviewed by me in PACS.      - My independent interpretation of CXR:  no pneumothorax          EKG:   Time: 1154, rate 79 bpm, sinus rhythm, no ST changes, no acute ischemic changes                Medications given in ED:   Medications   aspirin chewable tablet 324 mg (has no administration in time range)   famotidine (PEPCID) injection 20 mg (20 mg Intravenous Given 4/18/24 2354)   ondansetron (ZOFRAN) injection 4 mg (4 mg Intravenous Given 4/18/24 2355)   aluminum-magnesium hydroxide-simethicone (MAALOX MAX) 400-400-40 MG/5ML suspension 15 mL (15 mL Oral Given 4/18/24 2354)             Procedures:   Procedures          HEART Score: 5            Progress Notes/ED Course:   ED Course as of 04/19/24 0252   Fri Apr 19, 2024   0042 HS Troponin T(!!): 62  Troponin elevated.  Performed chart review.  Patient with chronically elevated troponins.  10 days ago during prior admission troponin was ranging 39-46, was not experiencing chest pain at that time, further workup not warranted then.  He had a negative echocardiogram and cardiac stress test in December of last year.  Will obtain repeat 2-hour troponin and reassess for disposition.  Patient is resting comfortably.  Denies any current acid reflux, heartburn, or chest pain. [SA]   0239 HS Troponin T(!): 47  Repeat troponin changed, dropped by 15 points.  Will admit to observation. Pt denies current chest pain.  Pt re-checked. Discussed need for admission, reviewed treatment plan and reason for admission with pt/family and admitting physician.  Pt/family voiced understanding of the plan for admission for further testing/treatment as needed.    [SA]   0248 Consult: I discussed the case with GUILHERME Barahona, with obs unit.  Reviewed patient's history, presentation, exam  findings, and ED workup.  They agree to admit to to obs unit under Dr. Huizar.   [SA]      ED Course User Index  [SA] Kari Magallanes PA-C                       DIAGNOSIS  Final diagnoses:   Chest pain, unspecified type   Gastroesophageal reflux disease without esophagitis   Elevated troponin                       Disposition:   ED Disposition       ED Disposition   Decision to Admit    Condition   --    Comment   Level of Care: Telemetry [5]   Diagnosis: Chest pain [143734]   Admitting Physician: TONG HUIZAR [[id number]]   Attending Physician: TONG HUIZAR [[id number]]                       Latest Documented Vital Signs:  As of 02:52 EDT  BP- 162/77 HR- 75 Temp- 98.4 °F (36.9 °C) (Tympanic) O2 sat- 95%      --        Provider Attestation:   I personally reviewed the past medical history, past surgical history, social history, family history, current medications, and allergies as they appear in the chart.    The patient was seen and examined by myself and Dr. Wu who agrees with plan.      Please note that portions of this were completed with a voice recognition program.     Note Disclaimer: At [facility], we believe that sharing information builds trust and better relationships. You are receiving this note because you are receiving care at [facility] or recently visited. It is possible you will see health information before a provider has talked with you about it. This kind of information can be easy to misunderstand. To help you fully understand what it means for your health, we urge you to discuss this note with your provider.        Provider note signed by:       Kari Magallanes PA-C  04/19/24 0256

## 2024-04-19 NOTE — CONSULTS
[facility]  Cardiology Consult Note    Patient Identification:  Name: Tony Leonard  Age: 83 y.o.  Sex: male  :  1940  MRN: [id number]             Requesting Physician: Brooklyn KENNEY    Reason for Consultation / Chief Complaint: elevated troponin    History of Present Illness:     This is a 83 year old male who is known with our service with chronic diastolic CHF, COPD, hx of PE, thoracic aortic aneurysm. He presented to [facility] ER with complaints of esophageal burning, reflux.  Work up in ER with initial HS troponin 62, repeat 47, 46. Hgb 10.3. ECG SR without acute ST changes. He was treated in ER with pepcid, zofran, and maalox and admitted for further management.     Echo // EF 56-60%, normal LV function. Stress test 2023 normal myocardial perfusion study with no evidence of ischemia.  Cardiac cath 2023 normal coronaries, normal LV gram.     Comorbid cardiac risk factors: age    Past Medical History:  Past Medical History:   Diagnosis Date    ADHD (attention deficit hyperactivity disorder)     Bronchiectasis     CHF (congestive heart failure)     Colon polyp     COPD (chronic obstructive pulmonary disease)     Diverticulosis     Hard of hearing     On home oxygen therapy     2 LITERS    Pneumonia     Prostate cancer 2019    Spinal stenosis, lumbar region with neurogenic claudication 2022     Past Surgical History:  Past Surgical History:   Procedure Laterality Date    BRONCHOSCOPY N/A 2016    Procedure: BRONCHOSCOPY with BAL of right lower lobe and left  lower lobe.  ;  Surgeon: Nando Diaz MD;  Location: [facility] ENDOSCOPY;  Service:     BRONCHOSCOPY N/A 2017    Procedure: BRONCHOSCOPY;  Surgeon: Katharina Salter MD;  Location: [facility] ENDOSCOPY;  Service:     BRONCHOSCOPY Bilateral 2017    Procedure: BRONCHOSCOPY WITH WASHINGS;  Surgeon: Katharina Salter MD;  Location: [facility] ENDOSCOPY;  Service:      BRONCHOSCOPY N/A 1/22/2018    Procedure: BRONCHOSCOPY AT BEDSIDE with BAL;  Surgeon: Katharina Salter MD;  Location: [facility] ENDOSCOPY;  Service:     BRONCHOSCOPY N/A 1/30/2018    Procedure: BRONCHOSCOPY with BAL and washing;  Surgeon: Shreyas Wild MD;  Location[facility] END[facility]COPY;  Service:     BRONCHOSCOPY Bilateral 4/24/2018    Procedure: BRONCHOSCOPY WITH WASHING;  Surgeon: Katharina Salter MD;  Location: [facility] ENDOSCOPY;  Service: Pulmonary    BRONCHOSCOPY N/A 12/28/2019    Procedure: BRONCHOSCOPY with bilateral washing;  Surgeon: Katharina Salter MD;  Location: [facility] ENDOSCOPY;  Service: Pulmonary    BRONCHOSCOPY Bilateral 1/4/2023    Procedure: BRONCHOSCOPY with lavage;  Surgeon: Katharina Salter MD;  Location: [facility] ENDOSCOPY;  Service: Pulmonary;  Laterality: Bilateral;  mucus plugging    CARDIAC CATHETERIZATION N/A 1/29/2023    Procedure: Left Heart Cath;  Surgeon: Johnnie Ang MD;  Location: [facility] CATH INVASIVE LOCATION;  Service: Cardiology;  Laterality: N/A;    CARDIAC CATHETERIZATION N/A 1/29/2023    Procedure: Coronary angiography;  Surgeon: Johnnie Ang MD;  Location: [facility] CATH INVASIVE LOCATION;  Service: Cardiology;  Laterality: N/A;    COLONOSCOPY  05/17/2013    eh, ih, tort, sig tics    COLONOSCOPY N/A 5/10/2019    Non-thrombosed external hemorrhoids found on perianal exam, Diverticulosis, Tortuous colon, One 5 mm polyp in the mid ascending colon, IH. Path: Tubular adenoma.     COLONOSCOPY N/A 9/24/2022    Procedure: COLONOSCOPY to cecum and TI with argon plasma coagulation.;  Surgeon: Bruce Black MD;  Location: [facility] ENDOSCOPY;  Service: Gastroenterology;  Laterality: N/A;  pre- GI bleeding  post- radiation proctitis, diverticulosis    ENDOSCOPY  03/19/2015    z line irreg, hh    ENDOSCOPY N/A 9/24/2022    Procedure: ESOPHAGOGASTRODUODENOSCOPY;  Surgeon: Bruce Black MD;  Location: [facility] ENDOSCOPY;  Service: Gastroenterology;  Laterality: N/A;  pre- GI bleeding  post-  esophagitis, hiatal hernia    HIP OPEN REDUCTION Right 1/17/2018    Procedure: HIP OPEN REDUCTION INTERNAL FIXATION WITH DYNAMIC HIP SCREW;  Surgeon: Les Black MD;  Location: [facility] MAIN OR;  Service:     SPINE SURGERY      TONSILLECTOMY      TOTAL HIP ARTHROPLASTY REVISION Right 9/23/2019    Procedure: HIP  REVISION RIGHT;  Surgeon: Isauro Warner MD;  Location: [facility] MAIN OR;  Service: Orthopedics      Allergies:  Allergies   Allergen Reactions    Daliresp [Roflumilast] Anaphylaxis    Latex Rash    Sulfa Antibiotics Rash     Home Meds:  Medications Prior to Admission   Medication Sig Dispense Refill Last Dose    FLUoxetine (PROzac) 20 MG capsule TAKE 1 CAPSULE DAILY 90 capsule 3 4/18/2024    gabapentin (NEURONTIN) 300 MG capsule Take 1 capsule by mouth 3 (Three) Times a Day. 6 capsule 0 4/18/2024    guaiFENesin (MUCINEX) 600 MG 12 hr tablet Take 1 tablet by mouth 2 (Two) Times a Day As Needed for Cough or Congestion.   4/18/2024    multivitamin with minerals (Multivitamin Adult) tablet tablet Take 1 tablet by mouth Daily. 30 tablet 11 4/18/2024    pantoprazole (PROTONIX) 40 MG EC tablet Take 1 tablet by mouth Daily. 90 tablet 3 4/18/2024    tiZANidine (ZANAFLEX) 2 MG tablet Take 1 tablet by mouth At Night As Needed for Muscle Spasms. (Patient taking differently: Take 1 tablet by mouth Daily As Needed for Muscle Spasms.) 30 tablet 1 4/18/2024    Umeclidinium-Vilanterol (ANORO ELLIPTA) 62.5-25 MCG/ACT aerosol powder  inhaler Inhale 1 puff Daily. 1 each 11 4/18/2024    acetaminophen (TYLENOL) 325 MG tablet Take 2 tablets by mouth Every 4 (Four) Hours As Needed for Mild Pain.       albuterol (PROVENTIL) (2.5 MG/3ML) 0.083% nebulizer solution Take 2.5 mg by nebulization Every 6 (Six) Hours As Needed for Wheezing. 360 mL 3     calcium carbonate (TUMS) 500 MG chewable tablet Chew 1 tablet As Needed for Indigestion or Heartburn.       dicyclomine (BENTYL) 10 MG capsule Take 1 capsule by mouth 3 (Three) Times a  Day As Needed (abdominal bloating). 30 capsule 0     ondansetron ODT (ZOFRAN-ODT) 4 MG disintegrating tablet Place 1 tablet on the tongue Every 6 (Six) Hours As Needed for Nausea or Vomiting.       sodium chloride 7 % nebulizer solution nebulizer solution Take 4 mL by nebulization Every 4 (Four) Hours As Needed (pt takes as needed at home).        Current Meds:   Current Facility-Administered Medications   Medication Dose Route Frequency Provider Last Rate Last Admin    albuterol (PROVENTIL) nebulizer solution 0.083% 2.5 mg/3mL  2.5 mg Nebulization Q6H PRN Brooklyn Lawson APRN        aluminum-magnesium hydroxide-simethicone (MAALOX MAX) 400-400-40 MG/5ML suspension 15 mL  15 mL Oral Q6H PRN Brooklyn Lawson APRN        arformoterol (BROVANA) nebulizer solution 15 mcg  15 mcg Nebulization BID - RT Brooklyn Lawson APRN   15 mcg at 04/19/24 0656    And    tiotropium (SPIRIVA RESPIMAT) 2.5 mcg/act aerosol solution inhaler  2 puff Inhalation Daily - RT Brooklyn Lawson APRN   2 puff at 04/19/24 0701    calcium carbonate (TUMS) chewable tablet 500 mg (200 mg elemental)  1 tablet Oral Q6H PRN Brooklyn Lawson APRN        dicyclomine (BENTYL) capsule 10 mg  10 mg Oral TID PRN Brooklyn Lawson APRN        FLUoxetine (PROzac) capsule 20 mg  20 mg Oral Daily Brooklyn Lawson APRN   20 mg at 04/19/24 0804    gabapentin (NEURONTIN) capsule 300 mg  300 mg Oral TID Brooklyn Lawson APRN   300 mg at 04/19/24 0804    ondansetron ODT (ZOFRAN-ODT) disintegrating tablet 4 mg  4 mg Oral Q6H PRN Brooklyn Lawson APRN        Or    ondansetron (ZOFRAN) injection 4 mg  4 mg Intravenous Q6H PRN Brooklyn Lawson APRN        pantoprazole (PROTONIX) EC tablet 40 mg  40 mg Oral BID AC Missael Servin MD        sodium chloride 0.9 % flush 10 mL  10 mL Intravenous Q12H Brooklyn Lawson APRN   10 mL at 04/19/24 0805    sodium chloride 0.9 % flush 10 mL  10 mL Intravenous PRN Brooklyn Lawson APRN        sodium chloride 0.9 % infusion 40 mL   40 mL Intravenous PRN Brooklyn Lawson, APRN        sucralfate (CARAFATE) tablet 1 g  1 g Oral 4x Daily AC & at Bedtime Missael Servin MD           Social History:   Social History     Tobacco Use    Smoking status: Never    Smokeless tobacco: Never   Substance Use Topics    Alcohol use: No     Comment: red wine occassional      Family History:  Family History   Problem Relation Age of Onset    Thyroid disease Mother     No Known Problems Father     Malig Hyperthermia Neg Hx         Review of Systems    Constitutional: No weakness,fatigue, fever, rigors, chills   Eyes: No vision changes, eye pain   ENT/oropharynx: No difficulty swallowing, sore throat, epistaxis, changes in hearing   Cardiovascular: No chest pain, chest tightness, palpitations, paroxysmal nocturnal dyspnea, orthopnea, diaphoresis, dizziness / syncopal episode   Respiratory: No shortness of breath, dyspnea on exertion, cough, wheezing hemoptysis   Gastrointestinal: No abdominal pain, nausea, vomiting, diarrhea, bloody stools   Genitourinary: No hematuria, dysuria   Neurological: No headache, tremors, numbness,  one-sided weakness    Musculoskeletal: No cramps, myalgias,  joint pain, joint swelling   Integument: No rash, edema           Constitutional:  Temp:  [98.1 °F (36.7 °C)-98.4 °F (36.9 °C)] 98.3 °F (36.8 °C)  Heart Rate:  [70-87] 71  Resp:  [16-18] 16  BP: (138-162)/(70-92) 138/70    Physical Exam   General:  Appears in no acute distress  Eyes: PERTL,  HEENT:  No JVD. Thyroid not visibly enlarged. No mucosal pallor or cyanosis  Respiratory: Respirations regular and unlabored at rest. BBS with good air entry in all fields. No crackles, rubs or wheezes auscultated  Cardiovascular: S1S2 Regular rate and rhythm. No murmur, rub or gallop auscultated. No carotid bruits. DP/PT pulses    . No pretibial pitting edema  Gastrointestinal: Abdomen soft, flat, non tender. Bowel sounds present. No hepatosplenomegaly. No ascites  Musculoskeletal: ARCINIEGA  x4. No abnormal movements  Extremities: No digital clubbing or cyanosis  Skin: Color pink. Skin warm and dry to touch. No rashes  No xanthoma  Neuro: AAO x3 CN II-XII grossly intact              Cardiographics  ECG:       Prior ECG 4/8/24 for comparison        Echocardiogram:   12/2023 Interpretation Summary         Left ventricular ejection fraction appears to be 56 - 60%.    Left ventricular diastolic function was normal.    Moderate dilation of the aortic root is present. Mild dilation of the sinuses of Valsalva is present.    12/2023 Interpretation Summary      Findings consistent with an equivocal ECG stress test.    Left ventricular ejection fraction is normal (Calculated EF = 60%).    Myocardial perfusion imaging indicates a normal myocardial perfusion study with no evidence of ischemia.    Impressions are consistent with a low risk study.    Cath 1/2023Conclusion         Successful right and left coronary angiogram and LV gram    Normal coronary artery    Normal LV gram EF 50%  Imaging  Chest X-ray:     Lab Review   Results from last 7 days   Lab Units 04/19/24  0520 04/19/24  0151 04/18/24  2351   CK TOTAL U/L 42  --   --    HSTROP T ng/L 46* 47* 62*         Results from last 7 days   Lab Units 04/18/24  2351   SODIUM mmol/L 140   POTASSIUM mmol/L 4.4   BUN mg/dL 28*   CREATININE mg/dL 1.01   CALCIUM mg/dL 8.7     @[id number]@  Results from last 7 days   Lab Units 04/19/24  0520 04/18/24  2351   WBC 10*3/mm3 8.12 8.06   HEMOGLOBIN g/dL 9.8* 10.3*   HEMATOCRIT % 30.8* 31.8*   PLATELETS 10*3/mm3 189 198             Assessment:  Chest burning  Chronic anemia  COPD      Recommendations / Plan:     This is an 83 year old male known with our service admitted for chest burning. He describes it as esophageal burning. HS troponin in ER initially 62, repeat 47, 46. He had stress test in December that was normal, echo with normal EF and LV function. Atypical chest pain with normal coronaries per cath 1/2023 as well  as normal stress and echo in December, not likely cardiac. Troponins trending down with chronically elevated trop, Check troponin in am, if not increasing ok for home from cardiac standpoint with outpatient follow up, our office will call to schedule appt.         Isabela Hallman, APRN  4/19/2024, 13:10 EDT      EMR Dragon/Transcription:   Dictated utilizing Dragon dictation

## 2024-04-19 NOTE — ED NOTES
"Nursing report ED to floor  Tony Leonard  83 y.o.  male    HPI :  HPI (Adult)  Stated Reason for Visit: Pt to ED via EMS from [facility]. Pt states, \"I called 911 myself, I have acute GERD for 5 days and today I vomited so I can't handle it anymore.\" Pt is A/Ox4 at the time of triage. Pt is on his baseline 2L NC.    Chief Complaint  Chief Complaint   Patient presents with    Heartburn       Admitting doctor:   Lexa Montesinos MD    Admitting diagnosis:   The primary encounter diagnosis was Chest pain, unspecified type. Diagnoses of Gastroesophageal reflux disease without esophagitis and Elevated troponin were also pertinent to this visit.    Code status:   Current Code Status       Date Active Code Status Order ID Comments User Context       Prior            Allergies:   Daliresp [roflumilast], Latex, and Sulfa antibiotics    Isolation:   No active isolations    Intake and Output  No intake or output data in the 24 hours ending 04/19/24 0259    Weight:       04/18/24  2331   Weight: 81.6 kg (180 lb)       Most recent vitals:   Vitals:    04/19/24 0034 04/19/24 0136 04/19/24 0143 04/19/24 0254   BP: 149/82 162/77     BP Location:       Patient Position:       Pulse: 76 77 75 72   Resp:       Temp:       TempSrc:       SpO2: 97% 95% 95% 96%   Weight:       Height:           Active LDAs/IV Access:   Lines, Drains & Airways       Active LDAs       Name Placement date Placement time Site Days    Peripheral IV 04/18/24 2352 Anterior;Right Hand 04/18/24  2352  Hand  less than 1                    Labs (abnormal labs have a star):   Labs Reviewed   COMPREHENSIVE METABOLIC PANEL - Abnormal; Notable for the following components:       Result Value    Glucose 109 (*)     BUN 28 (*)     Chloride 109 (*)     Total Protein 5.5 (*)     BUN/Creatinine Ratio 27.7 (*)     All other components within normal limits    Narrative:     GFR Normal >60  Chronic Kidney Disease <60  Kidney Failure <15    The GFR formula is " only valid for adults with stable renal function between ages 18 and 70.   SINGLE HS TROPONIN T - Abnormal; Notable for the following components:    HS Troponin T 62 (*)     All other components within normal limits    Narrative:     High Sensitive Troponin T Reference Range:  <14.0 ng/L- Negative Female for AMI  <22.0 ng/L- Negative Male for AMI  >=14 - Abnormal Female indicating possible myocardial injury.  >=22 - Abnormal Male indicating possible myocardial injury.   Clinicians would have to utilize clinical acumen, EKG, Troponin, and serial changes to determine if it is an Acute Myocardial Infarction or myocardial injury due to an underlying chronic condition.        CBC WITH AUTO DIFFERENTIAL - Abnormal; Notable for the following components:    RBC 3.70 (*)     Hemoglobin 10.3 (*)     Hematocrit 31.8 (*)     RDW 17.4 (*)     RDW-SD 54.6 (*)     Lymphocyte % 16.6 (*)     Eosinophil % 0.0 (*)     Immature Grans % 0.6 (*)     All other components within normal limits   HIGH SENSITIVITIY TROPONIN T 2HR - Abnormal; Notable for the following components:    HS Troponin T 47 (*)     Troponin T Delta -15 (*)     All other components within normal limits    Narrative:     High Sensitive Troponin T Reference Range:  <14.0 ng/L- Negative Female for AMI  <22.0 ng/L- Negative Male for AMI  >=14 - Abnormal Female indicating possible myocardial injury.  >=22 - Abnormal Male indicating possible myocardial injury.   Clinicians would have to utilize clinical acumen, EKG, Troponin, and serial changes to determine if it is an Acute Myocardial Infarction or myocardial injury due to an underlying chronic condition.        CBC AND DIFFERENTIAL    Narrative:     The following orders were created for panel order CBC & Differential.  Procedure                               Abnormality         Status                     ---------                               -----------         ------                     CBC Auto Differential[[id number]]         Abnormal            Final result                 Please view results for these tests on the individual orders.       EKG:   ECG 12 Lead Chest Pain   Preliminary Result   HEART RATE= 79  bpm   RR Interval= 759  ms   HI Interval= 163  ms   P Horizontal Axis= 20  deg   P Front Axis= 56  deg   QRSD Interval= 81  ms   QT Interval= 362  ms   QTcB= 416  ms   QRS Axis= -11  deg   T Wave Axis= 19  deg   - NORMAL ECG -   Sinus rhythm   Electronically Signed By:    Date and Time of Study: 2024-04-18 23:54:42          Meds given in ED:   Medications   aspirin chewable tablet 324 mg (has no administration in time range)   famotidine (PEPCID) injection 20 mg (20 mg Intravenous Given 4/18/24 2354)   ondansetron (ZOFRAN) injection 4 mg (4 mg Intravenous Given 4/18/24 2355)   aluminum-magnesium hydroxide-simethicone (MAALOX MAX) 400-400-40 MG/5ML suspension 15 mL (15 mL Oral Given 4/18/24 2354)       Imaging results:  XR Chest 1 View    Result Date: 4/19/2024  Stable findings when compared to April 8, 2024.  This report was finalized on 4/19/2024 12:53 AM by Dr. Danielle Arango M.D on Workstation: [id number]       Ambulatory status:   - 1 person    Social issues:   Social History     Socioeconomic History    Marital status: Single   Tobacco Use    Smoking status: Never    Smokeless tobacco: Never   Vaping Use    Vaping status: Never Used   Substance and Sexual Activity    Alcohol use: No     Comment: red wine occassional    Drug use: No    Sexual activity: Defer       Peripheral Neurovascular  Peripheral Neurovascular (Adult)  Peripheral Neurovascular WDL: WDL    Neuro Cognitive  Neuro Cognitive (Adult)  Cognitive/Neuro/Behavioral WDL: WDL    Learning  Learning Assessment (Adult)  Learning Readiness and Ability: no barriers identified    Respiratory  Respiratory WDL  Respiratory WDL: WDL    Abdominal Pain       Pain Assessments  Pain (Adult)  (0-10) Pain Rating: Rest: 5    NIH Stroke Scale       Lavern Evans RN  04/19/24  02:59 EDT

## 2024-04-19 NOTE — THERAPY EVALUATION
Patient Name: Tony Leonard  : 1940    MRN: [id number]                              Today's Date: 2024       Admit Date: 2024    Visit Dx:     ICD-10-CM ICD-9-CM   1. Chest pain, unspecified type  R07.9 786.50   2. Gastroesophageal reflux disease without esophagitis  K21.9 530.81   3. Elevated troponin  R79.89 790.6     Patient Active Problem List   Diagnosis    Thrush, oral    ADD (attention deficit disorder)    Hemorrhoids    Bronchiectasis with acute lower respiratory infection    Diverticul disease small and large intestine, no perforati or abscess    Benign non-nodular prostatic hyperplasia without lower urinary tract symptoms    Gastroesophageal reflux disease    Malaise and fatigue    Environmental allergies    Hemoptysis    Dyslipidemia    Primary osteoarthritis involving multiple joints    Pneumonia of right lower lobe due to infectious organism    Abnormal EKG    COPD (chronic obstructive pulmonary disease)    Mild malnutrition    Acute on chronic respiratory failure with hypoxia    Fracture, intertrochanteric, right femur, closed, initial encounter    Chronic diastolic CHF (congestive heart failure)    Hematoma of frontal scalp    Postoperative anemia due to acute blood loss    Urinary retention    Hemorrhagic disorder due to circulating anticoagulants    History of fracture of right hip    Closed fracture of right hip with routine healing    Chronic low back pain with sciatica    Change in bowel function    Rectal bleeding    Prostate cancer    On home oxygen therapy    Acute viral bronchitis    Peripheral neuropathy    Plantar fasciitis    HTN (hypertension)    COPD exacerbation    Bilateral lower extremity edema    Microscopic hematuria    Acute midline low back pain with right-sided sciatica    Spinal stenosis, lumbar region with neurogenic claudication    Compression deformity of vertebra    Rectal bleed    Esophagitis    Angiectasia    Hiatal hernia    Overweight (BMI 25.0-29.9)     Leukocytosis    Bilateral hip pain    Elevated troponin level not due myocardial infarction    Nocturnal hypoxia    Pneumonia of right upper lobe due to infectious organism    NSTEMI (non-ST elevated myocardial infarction)    Chronic respiratory failure with hypoxia    Paroxysmal atrial fibrillation    Acute exacerbation of chronic obstructive pulmonary disease (COPD)    Anemia    Non-compliant patient    Hypokalemia    Spondylolisthesis of lumbar region    Elevated troponin    Rhabdomyolysis    Multiple contusions    Fall    Dizziness    Contusion of hip    Chest pain     Past Medical History:   Diagnosis Date    ADHD (attention deficit hyperactivity disorder)     Bronchiectasis     CHF (congestive heart failure)     Colon polyp     COPD (chronic obstructive pulmonary disease)     Diverticulosis     Hard of hearing     On home oxygen therapy     2 LITERS    Pneumonia     Prostate cancer 04/22/2019    Spinal stenosis, lumbar region with neurogenic claudication 08/02/2022     Past Surgical History:   Procedure Laterality Date    BRONCHOSCOPY N/A 4/30/2016    Procedure: BRONCHOSCOPY with BAL of right lower lobe and left  lower lobe.  ;  Surgeon: Nando Diaz MD;  Location: [facility] ENDOSCOPY;  Service:     BRONCHOSCOPY N/A 4/28/2017    Procedure: BRONCHOSCOPY;  Surgeon: Katharina Salter MD;  Location: [facility] ENDOSCOPY;  Service:     BRONCHOSCOPY Bilateral 6/29/2017    Procedure: BRONCHOSCOPY WITH WASHINGS;  Surgeon: Katharina Salter MD;  Location: [facility] ENDOSCOPY;  Service:     BRONCHOSCOPY N/A 1/22/2018    Procedure: BRONCHOSCOPY AT BEDSIDE with BAL;  Surgeon: Katharina Salter MD;  Location: [facility] ENDOSCOPY;  Service:     BRONCHOSCOPY N/A 1/30/2018    Procedure: BRONCHOSCOPY with BAL and washing;  Surgeon: Shreyas Wild MD;  Location: [facility] ENDOSCOPY;  Service:     BRONCHOSCOPY Bilateral 4/24/2018    Procedure: BRONCHOSCOPY WITH WASHING;  Surgeon: Katharina Salter MD;  Location: [facility] ENDOSCOPY;  Service: Pulmonary     BRONCHOSCOPY N/A 12/28/2019    Procedure: BRONCHOSCOPY with bilateral washing;  Surgeon: Katharina Salter MD;  Location: [facility] ENDOSCOPY;  Service: Pulmonary    BRONCHOSCOPY Bilateral 1/4/2023    Procedure: BRONCHOSCOPY with lavage;  Surgeon: Katharina Salter MD;  Location: [facility] ENDOSCOPY;  Service: Pulmonary;  Laterality: Bilateral;  mucus plugging    CARDIAC CATHETERIZATION N/A 1/29/2023    Procedure: Left Heart Cath;  Surgeon: Johnnie Ang MD;  Location: [facility] CATH INVASIVE LOCATION;  Service: Cardiology;  Laterality: N/A;    CARDIAC CATHETERIZATION N/A 1/29/2023    Procedure: Coronary angiography;  Surgeon: Johnnie Ang MD;  Location: [facility] CATH INVASIVE LOCATION;  Service: Cardiology;  Laterality: N/A;    COLONOSCOPY  05/17/2013    eh, ih, tort, sig tics    COLONOSCOPY N/A 5/10/2019    Non-thrombosed external hemorrhoids found on perianal exam, Diverticulosis, Tortuous colon, One 5 mm polyp in the mid ascending colon, IH. Path: Tubular adenoma.     COLONOSCOPY N/A 9/24/2022    Procedure: COLONOSCOPY to cecum and TI with argon plasma coagulation.;  Surgeon: Bruce Black MD;  Location: [facility] ENDOSCOPY;  Service: Gastroenterology;  Laterality: N/A;  pre- GI bleeding  post- radiation proctitis, diverticulosis    ENDOSCOPY  03/19/2015    z line irreg, hh    ENDOSCOPY N/A 9/24/2022    Procedure: ESOPHAGOGASTRODUODENOSCOPY;  Surgeon: Bruce Black MD;  Location: [facility] ENDOSCOPY;  Service: Gastroenterology;  Laterality: N/A;  pre- GI bleeding  post- esophagitis, hiatal hernia    HIP OPEN REDUCTION Right 1/17/2018    Procedure: HIP OPEN REDUCTION INTERNAL FIXATION WITH DYNAMIC HIP SCREW;  Surgeon: Les Black MD;  Location: [facility] MAIN OR;  Service:     SPINE SURGERY      TONSILLECTOMY      TOTAL HIP ARTHROPLASTY REVISION Right 9/23/2019    Procedure: HIP  REVISION RIGHT;  Surgeon: Isauro Warner MD;  Location: [facility] MAIN OR;  Service: Orthopedics      General Information        Row Name 04/19/24 0925          Physical Therapy Time and Intention    Document Type evaluation  -CS     Mode of Treatment individual therapy;physical therapy  -CS       Row Name 04/19/24 0925          General Information    Patient Profile Reviewed yes  -CS     Prior Level of Function independent:;gait;transfer;bed mobility  rollator for mobility  -CS     Existing Precautions/Restrictions fall  -CS     Barriers to Rehab none identified  -CS       Row Name 04/19/24 0925          Living Environment    People in Home facility resident  [facility]  -CS       Row Name 04/19/24 0925          Cognition    Orientation Status (Cognition) oriented x 4  -CS       Row Name 04/19/24 0925          Safety Issues, Functional Mobility    Impairments Affecting Function (Mobility) endurance/activity tolerance;postural/trunk control;strength  -CS               User Key  (r) = Recorded By, (t) = Taken By, (c) = Cosigned By      Initials Name Provider Type    CS Destiny Santiago, PT Physical Therapist                   Mobility       Row Name 04/19/24 0926          Bed Mobility    Bed Mobility supine-sit;sit-supine  -CS     Supine-Sit [place] (Bed Mobility) standby assist  -CS     Sit-Supine [place] (Bed Mobility) not tested  -CS     Comment, (Bed Mobility) sitting EOB with aide at end of session  -CS       Row Name 04/19/24 0926          Sit-Stand Transfer    Sit-Stand [place] (Transfers) contact guard  -CS     Assistive Device (Sit-Stand Transfers) walker, front-wheeled  -CS       Row Name 04/19/24 0926          Gait/Stairs (Locomotion)    [place] Level (Gait) contact guard  -CS     Assistive Device (Gait) walker, front-wheeled  -CS     Distance in Feet (Gait) 150  -CS     Deviations/Abnormal Patterns (Gait) norm decreased;gait speed decreased;stride length decreased  -CS     Bilateral Gait Deviations forward flexed posture;heel strike decreased  -CS     [place] Level (Stairs) not tested  -CS     Comment,  (Gait/Stairs) slow pace, flexed posture at baseline  -CS               User Key  (r) = Recorded By, (t) = Taken By, (c) = Cosigned By      Initials Name Provider Type    CS Destiny Santiago, PT Physical Therapist                   Obj/Interventions       Row Name 04/19/24 0927          Range of Motion Comprehensive    General Range of Motion bilateral lower extremity ROM WFL  -CS       Row Name 04/19/24 0927          Strength Comprehensive (MMT)    General Manual Muscle Testing (MMT) Assessment other (see comments)  -CS     Comment, General Manual Muscle Testing (MMT) Assessment generalized weakness; B LE grossly 3+/5  -CS       Row Name 04/19/24 0927          Balance    Comment, Balance WFL  -CS               User Key  (r) = Recorded By, (t) = Taken By, (c) = Cosigned By      Initials Name Provider Type    CS Destiny Santiago, PT Physical Therapist                   Goals/Plan       Row Name 04/19/24 0931          Bed Mobility Goal 1 (PT)    Activity/Assistive Device (Bed Mobility Goal 1, PT) bed mobility activities, all  -CS     [place] Level/Cues Needed (Bed Mobility Goal 1, PT) independent  -CS     Time Frame (Bed Mobility Goal 1, PT) 1 week  -CS       Row Name 04/19/24 0931          Transfer Goal 1 (PT)    Activity/Assistive Device (Transfer Goal 1, PT) sit-to-stand/stand-to-sit;bed-to-chair/chair-to-bed  -CS     [place] Level/Cues Needed (Transfer Goal 1, PT) modified independence  -CS     Time Frame (Transfer Goal 1, PT) 1 week  -CS       Row Name 04/19/24 0931          Gait Training Goal 1 (PT)    Activity/Assistive Device (Gait Training Goal 1, PT) gait (walking locomotion);assistive device use  -CS     [place] Level (Gait Training Goal 1, PT) modified independence  -CS     Distance (Gait Training Goal 1, PT) 100'  -CS     Time Frame (Gait Training Goal 1, PT) 1 week  -CS               User Key  (r) = Recorded By, (t) = Taken By, (c) = Cosigned By      Initials Name Provider Type    CS  Destiny Santiago, PT Physical Therapist                   Clinical Impression       Row Name 04/19/24 0927          Pain    Pretreatment Pain Rating 0/10 - no pain  -CS     Posttreatment Pain Rating 0/10 - no pain  -CS       Row Name 04/19/24 0927          Plan of Care Review    Plan of Care Reviewed With patient  -CS     Outcome Evaluation Pt is a 84 y/o M admitted to [facility] with c/o esophageal burning pain with work-up still pending. Pt reports he lives at an care home and uses a rollator for mobility at baseline. Pt presents to PT with generalized weakness. Pt completed bed mobility, STS txf, and ambulated 150' c RW requiring SBA/CGA. Pt demo's a slow pace with a heavy flexed posture. Pt reports he is at his functional baseline. Pt sitting EOB with aide at end of session in request to use the restroom. PT recommends pt return to his [facility] and f/u with HHPT at D/C.  -CS       Row Name 04/19/24 0927          Therapy Assessment/Plan (PT)    Rehab Potential (PT) good, to achieve stated therapy goals  -CS     Criteria for Skilled Interventions Met (PT) yes;meets criteria  -CS     Therapy Frequency (PT) 3 times/wk  -CS       Row Name 04/19/24 0927          Vital Signs    Pre SpO2 (%) 97  -CS     O2 Delivery Pre Treatment room air  -CS     O2 Delivery Intra Treatment room air  -CS     Post SpO2 (%) 97  -CS     O2 Delivery Post Treatment room air  -CS       Row Name 04/19/24 0927          Positioning and Restraints    Pre-Treatment Position in bed  -CS     Post Treatment Position bed  -CS     In Bed notified nsg;sitting EOB;call light within reach;encouraged to call for assist;with other staff  -CS               User Key  (r) = Recorded By, (t) = Taken By, (c) = Cosigned By      Initials Name Provider Type    CS Destiny Santiago, PT Physical Therapist                   Outcome Measures       Row Name 04/19/24 0931 04/19/24 0402       How much help from another person do you currently need...    Turning from your back to your side  while in flat bed without using bedrails? 4  -CS 3  -NS    Moving from lying on back to sitting on the side of a flat bed without bedrails? 4  -CS 3  -NS    Moving to and from a bed to a chair (including a wheelchair)? 4  -CS 3  -NS    Standing up from a chair using your arms (e.g., wheelchair, bedside chair)? 4  -CS 3  -NS    Climbing 3-5 steps with a railing? 3  -CS 3  -NS    To walk in hospital room? 3  -CS 3  -NS    AM-PAC 6 Clicks Score (PT) 22  -CS 18  -NS    Highest Level of Mobility Goal 7 --> Walk 25 feet or more  -CS 6 --> Walk 10 steps or more  -NS      Row Name 04/19/24 0931          Functional Assessment    Outcome Measure Options AM-PAC 6 Clicks Basic Mobility (PT)  -CS               User Key  (r) = Recorded By, (t) = Taken By, (c) = Cosigned By      Initials Name Provider Type    CS Destiny Santiago, PT Physical Therapist    Micha Kruse RN Registered Nurse                                 Physical Therapy Education       Title: PT OT SLP Therapies (In Progress)       Topic: Physical Therapy (In Progress)       Point: Mobility training (Done)       Learning Progress Summary             Patient Acceptance, E,TB, VU,DU,NR by  at 4/19/2024 0931                         Point: Home exercise program (Not Started)       Learner Progress:  Not documented in this visit.              Point: Body mechanics (Done)       Learning Progress Summary             Patient Acceptance, E,TB, VU,DU,NR by  at 4/19/2024 0931                         Point: Precautions (Done)       Learning Progress Summary             Patient Acceptance, E,TB, VU,DU,NR by  at 4/19/2024 0931                                         User Key       Initials Effective Dates Name Provider Type Discipline     09/22/22 -  Destiny Santiago, PT Physical Therapist PT                  PT Recommendation and Plan     Plan of Care Reviewed With: patient  Outcome Evaluation: Pt is a 84 y/o M admitted to [facility] with c/o esophageal burning pain with  work-up still pending. [facility] and uses a rollator for mobility at baseline. Pt presents to PT with generalized weakness. Pt completed bed mobility, STS txf, and ambulated 150' c RW requiring SBA/CGA. Pt demo's a slow pace with a heavy flexed posture. Pt reports he is at his functional baseline. Pt sitting EOB with aide at end of session in request to use the restroom. PT recommends pt return to his [facility] and f/u with HHPT at D/C.     Time Calculation:         PT Charges       Row Name 04/19/24 0932             Time Calculation    Start Time 0833  -CS      Stop Time 0850  -CS      Time Calculation (min) 17 min  -CS      PT Received On 04/19/24  -CS      PT - Next Appointment 04/22/24  -CS      PT Goal Re-Cert Due Date 04/26/24  -CS         Time Calculation- PT    Total Timed Code Minutes- PT 15 minute(s)  -CS         Timed Charges    49226 - PT Therapeutic Activity Minutes 15  -CS         Total Minutes    Timed Charges Total Minutes 15  -CS       Total Minutes 15  -CS                User Key  (r) = Recorded By, (t) = Taken By, (c) = Cosigned By      Initials Name Provider Type    CS Destiny Santiago, PT Physical Therapist                  Therapy Charges for Today       Code Description Service Date Service Provider Modifiers Qty    [id number]  PT THERAPEUTIC ACT EA 15 MIN 4/19/2024 Destiny Santiago, PT GP 1    [id number]  PT EVAL MOD COMPLEXITY 2 4/19/2024 Destiny Santiago, PT GP 1            PT G-Codes  Outcome Measure Options: AM-PAC 6 Clicks Basic Mobility (PT)  AM-PAC 6 Clicks Score (PT): 22  PT Discharge Summary  Anticipated Discharge Disposition (PT): assisted living, home with home health    Destiny Santiago PT  4/19/2024

## 2024-04-19 NOTE — PLAN OF CARE
Goal Outcome Evaluation:  Plan of Care Reviewed With: patient           Outcome Evaluation: Pt is a 84 y/o M admitted to [facility] with c/o esophageal burning pain with work-up still pending. Pt reports he lives at an [facility] and uses a rollator for mobility at baseline. Pt presents to PT with generalized weakness. Pt completed bed mobility, STS txf, and ambulated 150' c RW requiring SBA/CGA. Pt demo's a slow pace with a heavy flexed posture. Pt reports he is at his functional baseline. Pt sitting EOB with aide at end of session in request to use the restroom. PT recommends pt return to his halfway and f/u with HHPT at D/C.      Anticipated Discharge Disposition (PT): assisted living, home with home health

## 2024-04-19 NOTE — ED PROVIDER NOTES
"SHARED VISIT: This visit was performed by BOTH a physician and an APC. The substantive portion of the medical decision making was performed by this attesting physician who made or approved the management plan and takes responsibility for patient management. All studies in the APC note (if performed) were independently interpreted by me.     The GUILHERME and I have discussed this patient's history, physical exam, and treatment plan.  I have reviewed the documentation and personally had a face to face interaction with the patient. I affirm the documentation and agree with the treatment and plan.  The attached note describes my personal findings.      I provided a substantive portion of the care of the patient.  I personally performed the physical exam in its entirety, and below are my findings.     Brief history of present illness: 83-year-old male complaining of dyspepsia for the last 5 days.    Physical exam:    /92 (BP Location: Right arm, Patient Position: Sitting)   Pulse 81   Temp 98.4 °F (36.9 °C) (Tympanic)   Resp 18   Ht 180.3 cm (71\")   Wt 81.6 kg (180 lb)   SpO2 96%   BMI 25.10 kg/m²       Physical Exam   Constitutional: No distress.  Nontoxic  HENT:  Head: Normocephalic and atraumatic.   Oropharynx: Mucous membranes are moist.   Eyes: . No scleral icterus.   Neck: Normal range of motion. Neck supple.   Cardiovascular: Pink warm and well perfused throughout.    Pulmonary/Chest: No respiratory distress.    Abdominal: Soft. There is no rebound or rigidity.  Benign exam  Musculoskeletal: Moves all extremities equally.    Neurological: Alert and oriented.  Speech fluent and easily intelligible  Skin: Skin is pink, warm, and dry.   Psychiatric: Mood and affect normal.   Nursing note and vitals reviewed.        MDM:    My differential diagnosis for abdominal pain includes but is not limited to:  Gastritis, gastroenteritis, peptic ulcer disease, GERD, esophageal perforation, acute appendicitis, mesenteric " adenitis, Meckel’s diverticulum, epiploic appendagitis, diverticulitis, colon cancer, ulcerative colitis, Crohn’s disease, intussusception, small bowel obstruction, adhesions, ischemic bowel, perforated viscus, ileus, obstipation, biliary colic, cholecystitis, cholelithiasis, Pasha-Chapo Silvestre, hepatitis, pancreatitis, common bile duct obstruction, cholangitis, bile leak, splenic trauma, splenic rupture, splenic infarction, splenic abscess, abdominal abscess, ascites, spontaneous bacterial peritonitis, hernia, UTI, cystitis, prostatitis, ureterolithiasis, urinary obstruction, AAA, myocardial infarction, pneumonia, cancer, porphyria, DKA, medications, sickle cell, viral syndrome, zoster      I have personally reviewed and interpreted the EKG obtained today in the emergency department at 2354 concomitant with treatment.  EKG reveals rhythm/rate -sinus, 80. MN-HARSHA within normal limits.  QRS-narrow complex ST/T-wave -no STEMI; QTc within normal limits.  Comparison 4/8/2024-no acute change      Please note that portions of this were completed with a voice recognition program.       Note Disclaimer: At [facility], we believe that sharing information builds trust and better relationships. You are receiving this note because you are receiving care at [facility] or recently visited. It is possible you will see health information before a provider has talked with you about it. This kind of information can be easy to misunderstand. To help you fully understand what it means for your health, we urge you to discuss this note with your provider.     Roderick Wu MD  04/19/24 0011

## 2024-04-19 NOTE — DISCHARGE PLACEMENT REQUEST
"Tony Casey (83 y.o. Male)       Date of Birth   1940    Social Security Number       Address   [address] [place] [address]    Home Phone   [phone]    MRN   [id number]       Mu-ism   Judaism    Marital Status   Single                            Admission Date   4/18/24    Admission Type   Emergency    Admitting Provider   Lexa Montesinos MD    Attending Provider   Lexa Montesinos MD    Department, Room/Bed   [facility] OBSERVATION, 120/1       Discharge Date       Discharge Disposition       Discharge Destination                                 Attending Provider: Lexa Montesinos MD    Allergies: Daliresp [Roflumilast], Latex, Sulfa Antibiotics    Isolation: None   Infection: None   Code Status: CPR    Ht: 180.3 cm (71\")   Wt: 97.7 kg (215 lb 6.2 oz)    Admission Cmt: None   Principal Problem: Chest pain [R07.9]                   Active Insurance as of 4/18/2024       Primary Coverage       Payor Plan Insurance Group Employer/Plan Group    MEDICARE MEDICARE A & B        Payor Plan Address Payor Plan Phone Number Payor Plan Fax Number Effective Dates    [address] [phone]  5/1/2003 - None Entered    [facility] [id number]         Subscriber Name Subscriber Birth Date Member ID       TONY CASEY 1940 [id number]               Secondary Coverage       Payor Plan Insurance Group Employer/Plan Group     FOR LIFE  FOR LIFE  SUP         Payor Plan Address Payor Plan Phone Number Payor Plan Fax Number Effective Dates    [address] [phone]  3/10/2016 - None Entered    [facility] [address]         Subscriber Name Subscriber Birth Date Member ID       TONY CASEY 1940 [id number]                     Emergency Contacts        (Rel.) Home Phone Work Phone Mobile Phone    CARMELAJOJOWILLIAM (Son) [phone] -- [phone]    Frederick Casey (Son) -- -- [phone]    Ricardo Casey (Son) " [phone] -- [phone]

## 2024-04-19 NOTE — CONSULTS
"[facility]  Initial Inpatient Consult Note    Referring Provider: Dr Montesinos    Reason for Consultation: GERD    Subjective     History of present illness:    83 y.o. male followed in our office by Dr Mattson.  Hx of GERD, on protonix 40mg/day.  Presents c/o chest pain and burning.  Awaiting cardiac evaluation as he has mild elevation of troponins.  Last EGD 9/2022 with medium sized hiatal hernia and reflux esophagitis.  Currently eating roast beef, denies any chest pain or GERD symptoms at this time.  Only complaint is \"diarrhea of the bladder\"    Past Medical History:  Past Medical History:   Diagnosis Date    ADHD (attention deficit hyperactivity disorder)     Bronchiectasis     CHF (congestive heart failure)     Colon polyp     COPD (chronic obstructive pulmonary disease)     Diverticulosis     Hard of hearing     On home oxygen therapy     2 LITERS    Pneumonia     Prostate cancer 04/22/2019    Spinal stenosis, lumbar region with neurogenic claudication 08/02/2022     Past Surgical History:  Past Surgical History:   Procedure Laterality Date    BRONCHOSCOPY N/A 4/30/2016    Procedure: BRONCHOSCOPY with BAL of right lower lobe and left  lower lobe.  ;  Surgeon: Nando Diaz MD;  Location: [facility] ENDOSCOPY;  Service:     BRONCHOSCOPY N/A 4/28/2017    Procedure: BRONCHOSCOPY;  Surgeon: Katharina Salter MD;  Location: [facility] ENDOSCOPY;  Service:     BRONCHOSCOPY Bilateral 6/29/2017    Procedure: BRONCHOSCOPY WITH WASHINGS;  Surgeon: Katharina Salter MD;  Location: [facility] ENDOSCOPY;  Service:     BRONCHOSCOPY N/A 1/22/2018    Procedure: BRONCHOSCOPY AT BEDSIDE with BAL;  Surgeon: Katharina Salter MD;  Location: [facility] ENDOSCOPY;  Service:     BRONCHOSCOPY N/A 1/30/2018    Procedure: BRONCHOSCOPY with BAL and washing;  Surgeon: Shreyas Wild MD;  Location: [facility] ENDOSCOPY;  Service:     BRONCHOSCOPY Bilateral 4/24/2018    Procedure: BRONCHOSCOPY WITH WASHING;  Surgeon: Katharina Salter MD;  " Location: [facility] ENDOSCOPY;  Service: Pulmonary    BRONCHOSCOPY N/A 12/28/2019    Procedure: BRONCHOSCOPY with bilateral washing;  Surgeon: Katharina Salter MD;  Location: [facility] ENDOSCOPY;  Service: Pulmonary    BRONCHOSCOPY Bilateral 1/4/2023    Procedure: BRONCHOSCOPY with lavage;  Surgeon: Katharina Salter MD;  Location: [facility] ENDOSCOPY;  Service: Pulmonary;  Laterality: Bilateral;  mucus plugging    CARDIAC CATHETERIZATION N/A 1/29/2023    Procedure: Left Heart Cath;  Surgeon: Johnnie Ang MD;  Location: [facility] CATH INVASIVE LOCATION;  Service: Cardiology;  Laterality: N/A;    CARDIAC CATHETERIZATION N/A 1/29/2023    Procedure: Coronary angiography;  Surgeon: Johnnie Ang MD;  Location: [facility] CATH INVASIVE LOCATION;  Service: Cardiology;  Laterality: N/A;    COLONOSCOPY  05/17/2013    eh, ih, tort, sig tics    COLONOSCOPY N/A 5/10/2019    Non-thrombosed external hemorrhoids found on perianal exam, Diverticulosis, Tortuous colon, One 5 mm polyp in the mid ascending colon, IH. Path: Tubular adenoma.     COLONOSCOPY N/A 9/24/2022    Procedure: COLONOSCOPY to cecum and TI with argon plasma coagulation.;  Surgeon: Bruce Black MD;  Location: [facility] ENDOSCOPY;  Service: Gastroenterology;  Laterality: N/A;  pre- GI bleeding  post- radiation proctitis, diverticulosis    ENDOSCOPY  03/19/2015    z line irreg, hh    ENDOSCOPY N/A 9/24/2022    Procedure: ESOPHAGOGASTRODUODENOSCOPY;  Surgeon: Bruce Black MD;  Location: [facility] ENDOSCOPY;  Service: Gastroenterology;  Laterality: N/A;  pre- GI bleeding  post- esophagitis, hiatal hernia    HIP OPEN REDUCTION Right 1/17/2018    Procedure: HIP OPEN REDUCTION INTERNAL FIXATION WITH DYNAMIC HIP SCREW;  Surgeon: Les Black MD;  Location: [facility] MAIN OR;  Service:     SPINE SURGERY      TONSILLECTOMY      TOTAL HIP ARTHROPLASTY REVISION Right 9/23/2019    Procedure: HIP  REVISION RIGHT;  Surgeon: Isauro Warner MD;  Location: [facility] MAIN OR;   Service: Orthopedics      Social History:   Social History     Tobacco Use    Smoking status: Never    Smokeless tobacco: Never   Substance Use Topics    Alcohol use: No     Comment: red wine occassional      Family History:  Family History   Problem Relation Age of Onset    Thyroid disease Mother     No Known Problems Father     Malvimal Hyperthermia Neg Hx        Home Meds:  Medications Prior to Admission   Medication Sig Dispense Refill Last Dose    FLUoxetine (PROzac) 20 MG capsule TAKE 1 CAPSULE DAILY 90 capsule 3 4/18/2024    gabapentin (NEURONTIN) 300 MG capsule Take 1 capsule by mouth 3 (Three) Times a Day. 6 capsule 0 4/18/2024    guaiFENesin (MUCINEX) 600 MG 12 hr tablet Take 1 tablet by mouth 2 (Two) Times a Day As Needed for Cough or Congestion.   4/18/2024    multivitamin with minerals (Multivitamin Adult) tablet tablet Take 1 tablet by mouth Daily. 30 tablet 11 4/18/2024    pantoprazole (PROTONIX) 40 MG EC tablet Take 1 tablet by mouth Daily. 90 tablet 3 4/18/2024    tiZANidine (ZANAFLEX) 2 MG tablet Take 1 tablet by mouth At Night As Needed for Muscle Spasms. (Patient taking differently: Take 1 tablet by mouth Daily As Needed for Muscle Spasms.) 30 tablet 1 4/18/2024    Umeclidinium-Vilanterol (ANORO ELLIPTA) 62.5-25 MCG/ACT aerosol powder  inhaler Inhale 1 puff Daily. 1 each 11 4/18/2024    acetaminophen (TYLENOL) 325 MG tablet Take 2 tablets by mouth Every 4 (Four) Hours As Needed for Mild Pain.       albuterol (PROVENTIL) (2.5 MG/3ML) 0.083% nebulizer solution Take 2.5 mg by nebulization Every 6 (Six) Hours As Needed for Wheezing. 360 mL 3     calcium carbonate (TUMS) 500 MG chewable tablet Chew 1 tablet As Needed for Indigestion or Heartburn.       dicyclomine (BENTYL) 10 MG capsule Take 1 capsule by mouth 3 (Three) Times a Day As Needed (abdominal bloating). 30 capsule 0     ondansetron ODT (ZOFRAN-ODT) 4 MG disintegrating tablet Place 1 tablet on the tongue Every 6 (Six) Hours As Needed for Nausea  or Vomiting.       sodium chloride 7 % nebulizer solution nebulizer solution Take 4 mL by nebulization Every 4 (Four) Hours As Needed (pt takes as needed at home).        Current Meds:   arformoterol, 15 mcg, Nebulization, BID - RT   And  tiotropium bromide monohydrate, 2 puff, Inhalation, Daily - RT  FLUoxetine, 20 mg, Oral, Daily  gabapentin, 300 mg, Oral, TID  pantoprazole, 40 mg, Oral, BID AC  sodium chloride, 10 mL, Intravenous, Q12H  sucralfate, 1 g, Oral, 4x Daily AC & at Bedtime      Allergies:  Allergies   Allergen Reactions    Daliresp [Roflumilast] Anaphylaxis    Latex Rash    Sulfa Antibiotics Rash       Objective     Vital Signs  Temp:  [98.1 °F (36.7 °C)-98.4 °F (36.9 °C)] 98.3 °F (36.8 °C)  Heart Rate:  [70-87] 71  Resp:  [16-18] 16  BP: (138-162)/(70-92) 138/70  Physical Exam:  General Appearance:     Alert, cooperative, in no acute distress   Abdomen:     Normal bowel sounds, no masses, no organomegaly, soft     nontender, nondistended, no guarding, no rebound                 tenderness   Rectal:     Deferred       Results Review:   I reviewed the patient's new clinical results.  EGD from 9/2022 reviewed    Results from last 7 days   Lab Units 04/19/24  0520 04/18/24  2351   WBC 10*3/mm3 8.12 8.06   HEMOGLOBIN g/dL 9.8* 10.3*   HEMATOCRIT % 30.8* 31.8*   PLATELETS 10*3/mm3 189 198     Results from last 7 days   Lab Units 04/18/24  2351   SODIUM mmol/L 140   POTASSIUM mmol/L 4.4   CHLORIDE mmol/L 109*   CO2 mmol/L 22.5   BUN mg/dL 28*   CREATININE mg/dL 1.01   CALCIUM mg/dL 8.7   BILIRUBIN mg/dL 0.3   ALK PHOS U/L 58   ALT (SGPT) U/L 22   AST (SGOT) U/L 17   GLUCOSE mg/dL 109*         Lab Results   Lab Value Date/Time    LIPASE 13 07/11/2023 2110    LIPASE 14 07/05/2023 1206    LIPASE 21 07/29/2021 1719    LIPASE 17 06/17/2017 1624       Radiology:  XR Chest 1 View   Final Result   Stable findings when compared to April 8, 2024.       This report was finalized on 4/19/2024 12:53 AM by Dr. Santos  APRIL Arango on Workstation: [id number]              Assessment & Plan   Assessment:    GERD   Hx of reflux esophagitis   Hiatal hernia   Chest pain with elevated troponin    Plan:   Will increase protonix to 40mg BID  Add carafate 1gm QID  Will arrange for office f/u with Dr Mattson    I discussed the patients findings and my recommendations with patient.         Missael Servin M.D.  [facility]  [address] [place] [address]  Office: [phone]

## 2024-04-19 NOTE — PROGRESS NOTES
ED OBSERVATION PROGRESS/DISCHARGE SUMMARY    Date of Admission: 4/18/2024   LOS: 0 days   PCP: Alfonso Goldstein MD    Final Diagnosis chest pain      Subjective     Tony Johnson is a 3-year-old male from local nursing home with significant past medical history of chronic anemia, GERD, COPD, CHF, hypertension, A-fib, anemia who scented to the ED complaining of esophageal burning stating it feels somewhat like reflux.  Patient reports he took Tums and the pain was not relieved.  Patient states his GERD is typically worse after eating and at night.  Reflux is worsened after dinner this evening when he was vomiting.  This prompted him to seek medical evaluation.  He was evaluated in the ED admitted to the observation unit for yes rule out.    Reviewing his chart he was admitted for a 2024 for dizziness and fall.  Troponin was noted to be mildly elevated at the time running between 39 and 46.  Patient had cardiac workup 12/22/2023 and at that time his troponins were elevated at 65 [id number].  Patient had a negative echocardiogram and stress test at that time.    Yesterday's ED workup revealed troponin of 62 and 47 and continues to be trended.  Hemoglobin 10.3-baseline, ECG was sinus rhythm.  Chest x-ray negative.  Patient given Pepcid, Maalox, Zofran in the ED    Cardiology has evaluated patient.  He will reevaluate in the morning and repeat the patient's troponin if not increasing he will be okay to be cleared from cardiology standpoint    Patient has been seen and evaluated by GI.  Patient appears clear for discharge from GI standpoint with increasing Protonix to 40 mg twice daily and adding a gram of Carafate 4 times daily have the patient follow-up with Dr. Hernandez    [facility] Outcome: Pending    ROS:  General: no fevers, chills  Respiratory: no cough, dyspnea  Cardiovascular: no chest pain, palpitations  Abdomen: No abdominal pain, nausea, vomiting, or diarrhea  Neurologic: No focal weakness    Objective   Physical  Exam:  I have reviewed the vital signs.  Temp:  [98.1 °F (36.7 °C)-98.5 °F (36.9 °C)] 98.2 °F (36.8 °C)  Heart Rate:  [70-89] 89  Resp:  [16-18] 17  BP: (111-162)/(67-92) 135/70  General Appearance:    Alert, cooperative, no distress  Head:    Normocephalic, atraumatic  Eyes:    Sclerae anicteric  Neck:   Supple, no mass  Lungs: Clear to auscultation bilaterally, respirations unlabored  Heart: Regular rate and rhythm, S1 and S2 normal, no murmur, rub or gallop  Abdomen:  Soft, non-tender, bowel sounds active, nondistended  Extremities: No clubbing, cyanosis, or edema to lower extremities  Pulses:  2+ and symmetric in distal lower extremities  Skin: No rashes   Neurologic: Oriented x3, Normal strength to extremities    Results Review:    I have reviewed the labs, radiology results and diagnostic studies.    Results from last 7 days   Lab Units 04/19/24  0520   WBC 10*3/mm3 8.12   HEMOGLOBIN g/dL 9.8*   HEMATOCRIT % 30.8*   PLATELETS 10*3/mm3 189     Results from last 7 days   Lab Units 04/18/24  2351   SODIUM mmol/L 140   POTASSIUM mmol/L 4.4   CHLORIDE mmol/L 109*   CO2 mmol/L 22.5   BUN mg/dL 28*   CREATININE mg/dL 1.01   CALCIUM mg/dL 8.7   BILIRUBIN mg/dL 0.3   ALK PHOS U/L 58   ALT (SGPT) U/L 22   AST (SGOT) U/L 17   GLUCOSE mg/dL 109*     Imaging Results (Last 24 Hours)       Procedure Component Value Units Date/Time    XR Chest 1 View [[id number]] Collected: 04/19/24 0052     Updated: 04/19/24 0057    Narrative:      SINGLE VIEW OF THE CHEST     HISTORY: Congestive heart failure     COMPARISON: April 8, 2024     FINDINGS:  There is cardiomegaly. There is no definite vascular congestion. There  is elevation of the right hemidiaphragm, with associated bronchovascular  crowding. No pneumothorax is seen. Bilateral effusions are suspected.  There are background changes of COPD.       Impression:      Stable findings when compared to April 8, 2024.     This report was finalized on 4/19/2024 12:53 AM by Dr. Santos  APRIL Arango on Workstation: [id number]               I have reviewed the medications.  ---------------------------------------------------------------------------------------------  Assessment & Plan   Assessment/Problem List    Chest pain      Plan:    Chest pain versus GERD  -Continue cardiac monitor  -Continue to trend troponins  - Trend CK  - Last echo 12/22/2023/EF 56 to 60%  - 12/22/2023 negative stress test  - Continue monitoring  - Maalox and Tums as needed  - Continue home Protonix  - Repeat troponin morning of 12/20/2024.    -Cardiology to reeval troponin negative will likely be discharged-Follow hemoglobin     COPD  -Continue home regimen  - Monitor respiratory status    DVT prophylaxis  -DVT prophylaxis as needed    Disposition: Pending    Follow-up after Discharge: Pending    This note will serve as a progress note    Erlin Hu III, PA 04/19/24 19:44 EDT    I have worn appropriate PPE during this patient encounter, sanitized my hands both with entering and exiting patient's room.      57 minutes has been spent by [facility] providers in the care of this patient while under observation status

## 2024-04-19 NOTE — H&P
[facility]   HISTORY AND PHYSICAL    Patient Name: Tony Leonard  : 1940  MRN: [id number]  Primary Care Physician:  Alfonso Goldstein MD  Date of admission: 2024    Subjective   Subjective     Chief Complaint:   Chief Complaint   Patient presents with    Heartburn         HPI:    Tony Leonard is a 83 y.o. male from a local nursing home with a history of Chronic anemia, GERD, COPD, CHF, hypertension, atrial fibrillation, anemia, who presented to the emergency department for esophageal burning pain that patient relates to his acid reflux. Patient reports history of GERD and states he takes Tums, but this has not been providing an relief of his symptoms. States he has been experiencing acid reflux over the past 5 days, that worsens after eating and at nighttime.  States reflux worsened after dinner tonight and he vomiting, prompting him to seek a medical evaluation.  Denies chest pain or shortness of breath. Patient was admitted to the ED Observation Unit for continued evaluation of his chest pain vs acid reflux.    Patient was admitted on 2024 for dizziness with fall. His Troponin was noted to be mildly elevated at that time with his baseline HS Troponin running from 39 up to 46. Patient also had a cardiac work-up 23 for elevated HS Troponin of 65, 81, 59 that was negative for cardiac injury, with negative Echocardiogram and Stress Test at that time. Creatine Kinase was notably elevated at 1782.     ED work-up: Troponin 62, 47, and trending. Hgb 10.3, which is at patient's baseline. EKG reported Sinus rhythm. Chest x-ray was negative. Patient was medicated with IV Pepcid, Maalox, and Zofran in the ED.     Continue to trend Troponin. Creatine Kinase pending. Cardiology consultation.    Review of Systems   All systems were reviewed and negative except for: as documented in HPI.    Personal History     Past Medical History:   Diagnosis Date    ADHD (attention deficit hyperactivity  disorder)     Bronchiectasis     CHF (congestive heart failure)     Colon polyp     COPD (chronic obstructive pulmonary disease)     Diverticulosis     Hard of hearing     On home oxygen therapy     2 LITERS    Pneumonia     Prostate cancer 04/22/2019    Spinal stenosis, lumbar region with neurogenic claudication 08/02/2022       Past Surgical History:   Procedure Laterality Date    BRONCHOSCOPY N/A 4/30/2016    Procedure: BRONCHOSCOPY with BAL of right lower lobe and left  lower lobe.  ;  Surgeon: Nando Diaz MD;  Location: [facility] ENDOSCOPY;  Service:     BRONCHOSCOPY N/A 4/28/2017    Procedure: BRONCHOSCOPY;  Surgeon: Katharina Salter MD;  Location: [facility] ENDOSCOPY;  Service:     BRONCHOSCOPY Bilateral 6/29/2017    Procedure: BRONCHOSCOPY WITH WASHINGS;  Surgeon: Katharina Salter MD;  Location: [facility] ENDOSCOPY;  Service:     BRONCHOSCOPY N/A 1/22/2018    Procedure: BRONCHOSCOPY AT BEDSIDE with BAL;  Surgeon: Katharina Salter MD;  Location: [facility] ENDOSCOPY;  Service:     BRONCHOSCOPY N/A 1/30/2018    Procedure: BRONCHOSCOPY with BAL and washing;  Surgeon: Shreyas Wild MD;  Location: [facility] ENDOSCOPY;  Service:     BRONCHOSCOPY Bilateral 4/24/2018    Procedure: BRONCHOSCOPY WITH WASHING;  Surgeon: Katharina Salter MD;  Location: [facility] ENDOSCOPY;  Service: Pulmonary    BRONCHOSCOPY N/A 12/28/2019    Procedure: BRONCHOSCOPY with bilateral washing;  Surgeon: Katharina Salter MD;  Location: [facility] ENDOSCOPY;  Service: Pulmonary    BRONCHOSCOPY Bilateral 1/4/2023    Procedure: BRONCHOSCOPY with lavage;  Surgeon: Katharina Salter MD;  Location: [facility] ENDOSCOPY;  Service: Pulmonary;  Laterality: Bilateral;  mucus plugging    CARDIAC CATHETERIZATION N/A 1/29/2023    Procedure: Left Heart Cath;  Surgeon: Johnnie Ang MD;  Location: [facility] CATH INVASIVE LOCATION;  Service: Cardiology;  Laterality: N/A;    CARDIAC CATHETERIZATION N/A 1/29/2023    Procedure: Coronary angiography;  Surgeon: Johnnie Ang MD;   Location: [facility] CATH INVASIVE LOCATION;  Service: Cardiology;  Laterality: N/A;    COLONOSCOPY  05/17/2013    eh, ih, tort, sig tics    COLONOSCOPY N/A 5/10/2019    Non-thrombosed external hemorrhoids found on perianal exam, Diverticulosis, Tortuous colon, One 5 mm polyp in the mid ascending colon, IH. Path: Tubular adenoma.     COLONOSCOPY N/A 9/24/2022    Procedure: COLONOSCOPY to cecum and TI with argon plasma coagulation.;  Surgeon: Bruce Black MD;  Location: [facility] ENDOSCOPY;  Service: Gastroenterology;  Laterality: N/A;  pre- GI bleeding  post- radiation proctitis, diverticulosis    ENDOSCOPY  03/19/2015    z line irreg, hh    ENDOSCOPY N/A 9/24/2022    Procedure: ESOPHAGOGASTRODUODENOSCOPY;  Surgeon: Bruce Black MD;  Location: [facility] ENDOSCOPY;  Service: Gastroenterology;  Laterality: N/A;  pre- GI bleeding  post- esophagitis, hiatal hernia    HIP OPEN REDUCTION Right 1/17/2018    Procedure: HIP OPEN REDUCTION INTERNAL FIXATION WITH DYNAMIC HIP SCREW;  Surgeon: Les Black MD;  Location: [facility] OR;  Service:     SPINE SURGERY      TONSILLECTOMY      TOTAL HIP ARTHROPLASTY REVISION Right 9/23/2019    Procedure: HIP  REVISION RIGHT;  Surgeon: Isauro Warner MD;  Location: [facility] OR;  Service: Orthopedics       Family History: family history includes No Known Problems in his father; Thyroid disease in his mother. Otherwise pertinent FHx was reviewed and not pertinent to current issue.    Social History:  reports that he has never smoked. He has never used smokeless tobacco. He reports that he does not drink alcohol and does not use drugs.    Home Medications:  FLUoxetine, Umeclidinium-Vilanterol, acetaminophen, albuterol, calcium carbonate, dicyclomine, gabapentin, guaiFENesin, multivitamin with minerals, ondansetron ODT, pantoprazole, sodium chloride, and tiZANidine    Allergies:  Allergies   Allergen Reactions    Daliresp [Roflumilast] Anaphylaxis    Latex Rash    Sulfa Antibiotics  Rash       Objective   Objective     Vitals:   Temp:  [98.1 °F (36.7 °C)-98.4 °F (36.9 °C)] 98.1 °F (36.7 °C)  Heart Rate:  [70-87] 79  Resp:  [18] 18  BP: (146-162)/(77-92) 146/79  Flow (L/min):  [2] 2  Physical Exam    Constitutional: Awake, alert   Eyes: PERRLA, sclerae anicteric, no conjunctival injection   Respiratory: Clear to auscultation bilaterally, nonlabored respirations    Cardiovascular: RRR, no murmurs, rubs, or gallops, palpable pedal pulses bilaterally   Gastrointestinal: Abdomen soft, nontender, nondistended   Musculoskeletal: No bilateral ankle edema, no clubbing or cyanosis to extremities   Psychiatric: Appropriate affect, cooperative   Neurologic: Oriented x 3, strength symmetric in all extremities, Cranial Nerves grossly intact to confrontation, speech clear   Skin: No rashes     Result Review    Result Review:  I have personally reviewed the results from the time of this admission to 4/19/2024 04:22 EDT and agree with these findings:  [x]  Laboratory list / accordion  []  Microbiology  [x]  Radiology  [x]  EKG/Telemetry   []  Cardiology/Vascular   []  Pathology  []  Old records  []  Other:  Most notable findings include: HS Troponin 62, 47      Assessment & Plan   Assessment / Plan     Brief Patient Summary:  Tony Leonard is a 83 y.o. male who was admitted to the ED Observation Unit for continued evaluation and work-up of his esophageal burning pain vs chest pain.    Active Hospital Problems:  Active Hospital Problems    Diagnosis     **Chest pain      Plan:     Chest pain vs GERD  Cardiac monitor  HS Troponin 62, 47, trending with past baseline elevated at 30's\  Creatine Kinase pending  EKG Sinus Rhythm  Last Echo 12/22/23 EF 56-60%  12/22/23 negative Stress  Telemetry monitoring  Maalox PRN; Tums PRN  Continue home Protonix  Cardiology consultation    Chronic anemia  Hgb 10.3, which is at baseline  Recheck labs in A.M.    COPD not affecting current episode of care  Continue home  regimen  Monitor respiratory status    DVT prophylaxis:  No DVT prophylaxis order currently exists.      CODE STATUS:       Admission Status:  I believe this patient meets observation status.    Electronically signed by PABLITO Coe, 04/19/24, 2:51 AM EDT.        75 minutes has been spent by [facility] providers in the care of this patient while under observation status      I have worn appropriate PPE during this patient encounter, sanitized my hands both with entering and exiting patient's room.    I have discussed plan of care with patient including advance care plan and/or surrogate decision maker.  Patient advises that their son will be their primary surrogate decision maker

## 2024-04-20 ENCOUNTER — READMISSION MANAGEMENT (OUTPATIENT)
Dept: CALL CENTER | Facility: HOSPITAL | Age: 84
End: 2024-04-20
Payer: MEDICARE

## 2024-04-20 VITALS
SYSTOLIC BLOOD PRESSURE: 172 MMHG | RESPIRATION RATE: 20 BRPM | TEMPERATURE: 97.9 F | HEIGHT: 71 IN | HEART RATE: 95 BPM | WEIGHT: 215.39 LBS | DIASTOLIC BLOOD PRESSURE: 79 MMHG | OXYGEN SATURATION: 98 % | BODY MASS INDEX: 30.15 KG/M2

## 2024-04-20 PROBLEM — R07.9 CHEST PAIN: Status: RESOLVED | Noted: 2024-04-19 | Resolved: 2024-04-20

## 2024-04-20 LAB — TROPONIN T SERPL HS-MCNC: 39 NG/L

## 2024-04-20 PROCEDURE — 99214 OFFICE O/P EST MOD 30 MIN: CPT | Performed by: NURSE PRACTITIONER

## 2024-04-20 PROCEDURE — 94761 N-INVAS EAR/PLS OXIMETRY MLT: CPT

## 2024-04-20 PROCEDURE — G0378 HOSPITAL OBSERVATION PER HR: HCPCS

## 2024-04-20 PROCEDURE — 94799 UNLISTED PULMONARY SVC/PX: CPT

## 2024-04-20 PROCEDURE — 94664 DEMO&/EVAL PT USE INHALER: CPT

## 2024-04-20 PROCEDURE — 84484 ASSAY OF TROPONIN QUANT: CPT

## 2024-04-20 RX ORDER — SUCRALFATE 1 G/1
1 TABLET ORAL 4 TIMES DAILY
Qty: 120 TABLET | Refills: 0 | Status: SHIPPED | OUTPATIENT
Start: 2024-04-20

## 2024-04-20 RX ADMIN — Medication 10 ML: at 09:03

## 2024-04-20 RX ADMIN — ARFORMOTEROL TARTRATE 15 MCG: 15 SOLUTION RESPIRATORY (INHALATION) at 09:58

## 2024-04-20 RX ADMIN — PANTOPRAZOLE SODIUM 40 MG: 40 TABLET, DELAYED RELEASE ORAL at 09:02

## 2024-04-20 RX ADMIN — GABAPENTIN 300 MG: 300 CAPSULE ORAL at 09:02

## 2024-04-20 RX ADMIN — FLUOXETINE HYDROCHLORIDE 20 MG: 20 CAPSULE ORAL at 09:01

## 2024-04-20 RX ADMIN — TIOTROPIUM BROMIDE INHALATION SPRAY 2 PUFF: 3.12 SPRAY, METERED RESPIRATORY (INHALATION) at 09:59

## 2024-04-20 RX ADMIN — SUCRALFATE 1 G: 1 TABLET ORAL at 09:02

## 2024-04-20 NOTE — DISCHARGE INSTRUCTIONS
Your cardiac workup shows no evidence of STEMI and your troponin is trending downward  GI recommends increasing your Protonix to twice a day and they have started you on Carafate 1 g 4 times daily  Dr. Mattson's office will contact you for follow-up appointment

## 2024-04-20 NOTE — PLAN OF CARE
Goal Outcome Evaluation:      Carafate and PPI continued. Pt reports improvement in acid reflux, no epigastric pain/burning overnight. Cardiology and GI following.

## 2024-04-20 NOTE — PLAN OF CARE
Goal Outcome Evaluation:    Vital signs stable. Tolerating oral food and fluids. Nil n/v. Nil c/o pain/discomfort on epigastric region. A& O x4. Discharge order in place. Discharge instruction/plan given to patient. Nil concerns voiced.

## 2024-04-20 NOTE — PROGRESS NOTES
ED OBSERVATION PROGRESS/DISCHARGE SUMMARY    Date of Admission: 4/18/2024   LOS: 0 days   PCP: Alfonso Goldstein MD      Subjective   Patient resting comfortably and in no acute distress.  Denies chest pain, palpitation or dyspnea.    Hospital Outcome: Tony Johnson is an 83-year-old male who was admitted to the ED observation unit for a complaint of esophageal burning that was unrelieved after taking Tums.  Patient was given Pepcid Maalox and Zofran in the ED with relief of symptom.  His home medications of Protonix and Maalox were continued.  GI was consulted and increased patient's home Protonix to 40 mg twice daily and added Carafate 1 g 4 times daily to his home regimen.  Patient will follow-up with GI, Dr. Mattson, in the office.     Cardiology was consulted for elevated Troponin. Patient's troponins were noted to be elevated on admission, which patient has had in the past, with a negative cardiac workup in December 2023.      Patient had no events overnight and his repeat troponin continue to trend downward therefore he will be discharged home and follow-up with the cardiology outpatient.    ROS:  General: no fevers, chills  Respiratory: no cough, dyspnea  Cardiovascular: no chest pain, palpitations  Abdomen: No abdominal pain, nausea, vomiting, or diarrhea  Neurologic: No focal weakness    Objective   Physical Exam:  I have reviewed the vital signs.  Temp:  [98.1 °F (36.7 °C)-98.5 °F (36.9 °C)] 98.1 °F (36.7 °C)  Heart Rate:  [64-89] 64  Resp:  [16-17] 16  BP: (111-143)/(63-82) 119/63  General Appearance:    Alert, cooperative, no distress  Head:    Normocephalic, atraumatic  Eyes:    Sclerae anicteric  Lungs: Clear to auscultation bilaterally, respirations unlabored  Heart: Regular rate and rhythm, S1 and S2 normal, no murmur, rub or gallop  Abdomen:  Soft, non-tender, nondistended  Extremities: No clubbing, cyanosis, or edema to lower extremities  Skin: No rashes   Neurologic: Oriented x3, Normal  strength to extremities    Results Review:    I have reviewed the labs, radiology results and diagnostic studies.    Results from last 7 days   Lab Units 04/19/24  0520   WBC 10*3/mm3 8.12   HEMOGLOBIN g/dL 9.8*   HEMATOCRIT % 30.8*   PLATELETS 10*3/mm3 189     Results from last 7 days   Lab Units 04/18/24  2351   SODIUM mmol/L 140   POTASSIUM mmol/L 4.4   CHLORIDE mmol/L 109*   CO2 mmol/L 22.5   BUN mg/dL 28*   CREATININE mg/dL 1.01   CALCIUM mg/dL 8.7   BILIRUBIN mg/dL 0.3   ALK PHOS U/L 58   ALT (SGPT) U/L 22   AST (SGOT) U/L 17   GLUCOSE mg/dL 109*     Imaging Results (Last 24 Hours)       ** No results found for the last 24 hours. **            I have reviewed the medications.  ---------------------------------------------------------------------------------------------  Assessment & Plan   Assessment/Problem List    Chest pain      Plan:  Chest pain   -Continue cardiac monitor  - Troponins chronically elevated. Continue to trend troponins  - Trend CK  - Last echo 12/22/2023/EF 56 to 60%  - 12/22/2023 negative stress test  - Continue monitoring  - Repeat troponin morning of 12/20/2024.    -Cardiology to reeval troponin negative will likely be discharged-Follow hemoglobin     GERD   - Maalox and Tums as needed  - Continue home Protonix with increase in home dose.   -Carafate 1 Gram QID  -GI consulted with medication changes as noted. F/U outpatient    COPD  -Continue home regimen  - Monitor respiratory status     DVT prophylaxis  -DVT prophylaxis as needed       Disposition: Home    Follow-up after Discharge: Cardiology    This note will serve as a discharge summary    Brooklyn Lawson, APRN 04/20/24 05:49 EDT    I have worn appropriate PPE during this patient encounter, sanitized my hands both with entering and exiting patient's room.      50 minutes has been spent by [facility] providers in the care of this patient while under observation status

## 2024-04-20 NOTE — CASE MANAGEMENT/SOCIAL WORK
Discharge Planning Assessment  [facility]     Patient Name: Tony Leonard  MRN: [id number]  Today's Date: 4/20/2024    Admit Date: 4/18/2024    Plan: Return to [facility] - may need transport   Discharge Needs Assessment    No documentation.                  Discharge Plan       Row Name 04/20/24 1108       Plan    Plan Comments Received call from primary RN to request transport back to facility; Call placed to patient to verify ability to transport or any other resources- pt denies- Call placed to [facility] to schedule transport, no answer, left message; Call placed to [facility]- scheduled transport with confirmation #[id number]- updating staff that arrival time is 1230-                  Continued Care and Services - Admitted Since 4/18/2024       Destination Coordination complete.      Service Provider Request Status Selected Services Address Phone Fax Patient Preferred    [address] [address] [phone] --                  Selected Continued Care - Prior Encounters Includes continued care and service providers with selected services from prior encounters from 1/19/2024 to 4/20/2024      Discharged on 4/9/2024 Admission date: 4/8/2024 - Discharge disposition: Skilled Nursing Facility (DC - External)      Destination       Service Provider Selected Services Address Phone Fax Patient Preferred    [address] [address] [phone] --                      Discharged on 2/14/2024 Admission date: 2/10/2024 - Discharge disposition: Home-Health Care [facility]      Home Medical Care       Service Provider Selected Services Address Phone Fax Patient Preferred    [facility] ,  Home Rehabilitation [address] [place] [address], [facility] [address] [address] [phone] --                          Expected Discharge Date and Time        Expected Discharge Date Expected Discharge Time    Apr 20, 2024            Demographic Summary    No documentation.                  Functional Status    No documentation.                  Psychosocial    No documentation.                  Abuse/Neglect    No documentation.                  Legal    No documentation.                  Substance Abuse    No documentation.                  Patient Forms    No documentation.                     Yessica Hodge RN

## 2024-04-20 NOTE — PROGRESS NOTES
HOSPITAL PROGRESS NOTE    Date of Service: 24  LOS:  LOS: 0 days   Patient Name: Tony Leonard  Age/Sex: 83 y.o. male  : 1940  MRN: [id number]  Primary Cardiologist: Dr. Johnnie Ang    Subjective:     Chief Complaint/Follow up:   Burning epigastric and chest pain, Elevated troponin    Interval History:   Patient was sitting up wheelchair.  Short of breath walking back from bathroom.  Denies chest pain.  Edema present.    Objective:     Objective:  Temp:  [97.9 °F (36.6 °C)-98.5 °F (36.9 °C)] 97.9 °F (36.6 °C)  Heart Rate:  [64-96] 95  Resp:  [16-20] 20  BP: (111-172)/(63-82) 172/79  Body mass index is 30.04 kg/m².    Intake/Output Summary (Last 24 hours) at 2024 1211  Last data filed at 2024 0955  Gross per 24 hour   Intake --   Output 1425 ml   Net -1425 ml         24  2331 24  0346   Weight: 81.6 kg (180 lb) 97.7 kg (215 lb 6.2 oz)       Physical Exam:   General Appearance: Alert, cooperative, in no acute distress. AAOx4.   Neck: No JVD.  Lungs: Clear.  Short of breath after walking  Heart: Regular rate and rhythm, normal S1 and S2, no murmurs, gallops or rubs.  Extremities: Bilateral lower extremity edema present.      Lab Review:   Results from last 7 days   Lab Units 24  2351   SODIUM mmol/L 140   POTASSIUM mmol/L 4.4   CHLORIDE mmol/L 109*   CO2 mmol/L 22.5   BUN mg/dL 28*   CREATININE mg/dL 1.01   GLUCOSE mg/dL 109*   CALCIUM mg/dL 8.7   AST (SGOT) U/L 17   ALT (SGPT) U/L 22     Results from last 7 days   Lab Units 24  0346 24  0520 24  0151 24  2351   CK TOTAL U/L  --  42  --   --    HSTROP T ng/L 39* 46* 47* 62*     Results from last 7 days   Lab Units 24  0520 24  2351   WBC 10*3/mm3 8.12 8.06   HEMOGLOBIN g/dL 9.8* 10.3*   HEMATOCRIT % 30.8* 31.8*   PLATELETS 10*3/mm3 189 198                           Results for orders placed during the hospital encounter of 23    Adult Transthoracic Echo Complete  W/ Cont if Necessary Per Protocol    Interpretation Summary    Left ventricular ejection fraction appears to be 56 - 60%.    Left ventricular diastolic function was normal.    Moderate dilation of the aortic root is present. Mild dilation of the sinuses of Valsalva is present.    I reviewed the patient's new clinical results.  I personally viewed and interpreted the patient's EKG/Telemetry data/Labs/Test Results.     Current Medications:   Scheduled Meds:  arformoterol, 15 mcg, Nebulization, BID - RT   And  tiotropium bromide monohydrate, 2 puff, Inhalation, Daily - RT  FLUoxetine, 20 mg, Oral, Daily  gabapentin, 300 mg, Oral, TID  pantoprazole, 40 mg, Oral, BID AC  sodium chloride, 10 mL, Intravenous, Q12H  sucralfate, 1 g, Oral, 4x Daily AC & at Bedtime           Allergies:  Allergies   Allergen Reactions    Daliresp [Roflumilast] Anaphylaxis    Latex Rash    Sulfa Antibiotics Rash       Assessment:       * No active hospital problems. *        Plan:   Assessment & Plan       1/2.  Chest/epigastric burning pain.  Felt to be GI related.  Started on Protonix and Carafate.  To take Maalox and Tums as needed  3.  Elevated troponin level, but flat.  Not felt to be due to ACS.  Negative stress test 12/23.  4.  COPD.  5.  Hypertension.  BP elevated on last check, but decently controlled prior.  Continue to monitor at home.  6.  Dr. FUENTES's office to arrange outpatient cardiology appointment.      PABLITO Lopez  [facility]   04/20/24  12:11 EDT

## 2024-04-21 NOTE — OUTREACH NOTE
Prep Survey      Flowsheet Row Responses   [facility] facility patient discharged from? [place]   Is LACE score < 7 ? No   Eligibility Not Eligible   What are the reasons patient is not eligible? [facility]   Does the patient have one of the following disease processes/diagnoses(primary or secondary)? Other   Prep survey completed? Yes            MICHELLE VELOZ - Registered Nurse

## 2024-04-21 NOTE — CASE MANAGEMENT/SOCIAL WORK
Case Management Discharge Note      Final Note: returned to [facility] via [facility]         Selected Continued Care - Discharged on 4/20/2024 Admission date: 4/18/2024 - Discharge disposition: Home or Self Care      Destination Coordination complete.      Service Provider Selected Services Address Phone Fax Patient Preferred    [facility] [address] [place] [address] [phone] --              Durable Medical Equipment    No services have been selected for the patient.                Dialysis/Infusion    No services have been selected for the patient.                Home Medical Care    No services have been selected for the patient.                Therapy    No services have been selected for the patient.                Community Resources    No services have been selected for the patient.                Community & DME    No services have been selected for the patient.                    Selected Continued Care - Prior Encounters Includes continued care and service providers with selected services from prior encounters from 1/19/2024 to 4/20/2024      Discharged on 4/9/2024 Admission date: 4/8/2024 - Discharge disposition: Skilled Nursing Facility (DC - External)      Destination       Service Provider Selected Services Address Phone Fax Patient Preferred    [address] [address] [phone] --                      Discharged on 2/14/2024 Admission date: 2/10/2024 - Discharge disposition: Home-Health Care c      Home Medical Care       Service Provider Selected Services Address Phone Fax Patient Preferred    [facility] ,  Home Rehabilitation [address] [facility], [facility] [address] [id number] [phone] --                          Transportation Services  Ambulance: [facility]    Final Discharge Disposition Code: 04 -  intermediate care facility

## 2024-05-02 ENCOUNTER — OFFICE VISIT (OUTPATIENT)
Dept: GASTROENTEROLOGY | Facility: CLINIC | Age: 84
End: 2024-05-02
Payer: MEDICARE

## 2024-05-02 VITALS
BODY MASS INDEX: 28.14 KG/M2 | HEIGHT: 71 IN | SYSTOLIC BLOOD PRESSURE: 122 MMHG | OXYGEN SATURATION: 92 % | WEIGHT: 201 LBS | DIASTOLIC BLOOD PRESSURE: 78 MMHG | TEMPERATURE: 97.3 F | HEART RATE: 97 BPM

## 2024-05-02 DIAGNOSIS — R13.10 DYSPHAGIA, UNSPECIFIED TYPE: ICD-10-CM

## 2024-05-02 DIAGNOSIS — K21.9 GASTROESOPHAGEAL REFLUX DISEASE, UNSPECIFIED WHETHER ESOPHAGITIS PRESENT: Primary | ICD-10-CM

## 2024-05-02 PROCEDURE — 3074F SYST BP LT 130 MM HG: CPT | Performed by: INTERNAL MEDICINE

## 2024-05-02 PROCEDURE — 3078F DIAST BP <80 MM HG: CPT | Performed by: INTERNAL MEDICINE

## 2024-05-02 PROCEDURE — 1159F MED LIST DOCD IN RCRD: CPT | Performed by: INTERNAL MEDICINE

## 2024-05-02 PROCEDURE — 99214 OFFICE O/P EST MOD 30 MIN: CPT | Performed by: INTERNAL MEDICINE

## 2024-05-02 PROCEDURE — 1160F RVW MEDS BY RX/DR IN RCRD: CPT | Performed by: INTERNAL MEDICINE

## 2024-05-02 RX ORDER — PREDNISONE 20 MG/1
TABLET ORAL
COMMUNITY

## 2024-05-02 RX ORDER — ROPINIROLE 1 MG/1
TABLET, FILM COATED ORAL
COMMUNITY

## 2024-05-02 RX ORDER — METHYLPHENIDATE HYDROCHLORIDE 10 MG/1
TABLET ORAL
COMMUNITY

## 2024-05-02 NOTE — PROGRESS NOTES
Chief Complaint   Patient presents with    Heartburn     gerd    Difficulty Swallowing        Tony Leonard is a  83 y.o. male here for a follow up visit for GERD, dysphagia    HPI this 83-year-old male patient of Dr. Goldstein was recently seen by our service when hospitalized this April.  He had been complaining of some chest pain and burning sensation and underwent a cardiac workup in that same setting.  He had undergone previous upper endoscopy and colonoscopy by Dr. Black in September 2022.  That upper endoscopy did reveal esophagitis and esophageal ulcer.  He was placed on double strength Protonix at 40 mg twice daily and Carafate 1 g with meals and at bedtime.  He does not believe he is receiving his medication in the appropriate fashion and I will reiterate the need for Carafate suspension of 1 g to be administered before meals and also at bedtime.  He will take this in conjunction with his double strength pantoprazole and call with a progress report in 2 weeks time.  If his symptoms have not dramatically improved, then I would entertain more formal evaluation such as endoscopy.  He ask about his colic symptoms and I explained that they may or may not be reflux related.  He does have underlying respiratory issues including bronchiectasis and COPD.    Past Medical History:   Diagnosis Date    ADHD (attention deficit hyperactivity disorder)     Bronchiectasis     CHF (congestive heart failure)     Colon polyp     COPD (chronic obstructive pulmonary disease)     Diverticulosis     Hard of hearing     On home oxygen therapy     2 LITERS    Pneumonia     Prostate cancer 04/22/2019    Spinal stenosis, lumbar region with neurogenic claudication 08/02/2022       Current Outpatient Medications   Medication Sig Dispense Refill    acetaminophen (TYLENOL) 325 MG tablet Take 2 tablets by mouth Every 4 (Four) Hours As Needed for Mild Pain.      albuterol (PROVENTIL) (2.5 MG/3ML) 0.083% nebulizer solution Take 2.5 mg by  nebulization Every 6 (Six) Hours As Needed for Wheezing. 360 mL 3    calcium carbonate (TUMS) 500 MG chewable tablet Chew 1 tablet As Needed for Indigestion or Heartburn.      dicyclomine (BENTYL) 10 MG capsule Take 1 capsule by mouth 3 (Three) Times a Day As Needed (abdominal bloating). 30 capsule 0    FLUoxetine (PROzac) 20 MG capsule TAKE 1 CAPSULE DAILY 90 capsule 3    gabapentin (NEURONTIN) 300 MG capsule Take 1 capsule by mouth 3 (Three) Times a Day. 6 capsule 0    guaiFENesin (MUCINEX) 600 MG 12 hr tablet Take 1 tablet by mouth 2 (Two) Times a Day As Needed for Cough or Congestion.      multivitamin with minerals (Multivitamin Adult) tablet tablet Take 1 tablet by mouth Daily. 30 tablet 11    ondansetron ODT (ZOFRAN-ODT) 4 MG disintegrating tablet Place 1 tablet on the tongue Every 6 (Six) Hours As Needed for Nausea or Vomiting.      pantoprazole (PROTONIX) 40 MG EC tablet Take 1 tablet by mouth Daily. 90 tablet 3    predniSONE (DELTASONE) 20 MG tablet       rOPINIRole (REQUIP) 1 MG tablet       sodium chloride 7 % nebulizer solution nebulizer solution Take 4 mL by nebulization Every 4 (Four) Hours As Needed (pt takes as needed at home).      sucralfate (Carafate) 1 g tablet Take 1 tablet by mouth 4 (Four) Times a Day. 120 tablet 0    tiZANidine (ZANAFLEX) 2 MG tablet Take 1 tablet by mouth At Night As Needed for Muscle Spasms. (Patient taking differently: Take 1 tablet by mouth Daily As Needed for Muscle Spasms.) 30 tablet 1    Umeclidinium-Vilanterol (ANORO ELLIPTA) 62.5-25 MCG/ACT aerosol powder  inhaler Inhale 1 puff Daily. 1 each 11    methylphenidate (RITALIN) 10 MG tablet  (Patient not taking: Reported on 5/2/2024)      Mirabegron ER (Myrbetriq) 25 MG tablet sustained-release 24 hour 24 hr tablet  (Patient not taking: Reported on 5/2/2024)       No current facility-administered medications for this visit.       PRN Meds:.    Allergies   Allergen Reactions    Daliresp [Roflumilast] Anaphylaxis    Latex  Rash    Sulfa Antibiotics Rash       Social History     Socioeconomic History    Marital status: Single   Tobacco Use    Smoking status: Never    Smokeless tobacco: Never   Vaping Use    Vaping status: Never Used   Substance and Sexual Activity    Alcohol use: No     Comment: red wine occassional    Drug use: No    Sexual activity: Defer       Family History   Problem Relation Age of Onset    Thyroid disease Mother     No Known Problems Father     Malig Hyperthermia Neg Hx        Review of Systems   Constitutional:  Negative for activity change, appetite change, fatigue and unexpected weight change.   HENT:  Negative for congestion, facial swelling, sore throat, trouble swallowing and voice change.    Eyes:  Negative for photophobia and visual disturbance.   Respiratory:  Positive for cough. Negative for choking.    Cardiovascular:  Negative for chest pain.   Gastrointestinal:  Negative for abdominal distention, abdominal pain, anal bleeding, blood in stool, constipation, diarrhea, nausea, rectal pain and vomiting.        GERD  Dysphagia   Endocrine: Negative for polyphagia.   Musculoskeletal:  Negative for arthralgias, gait problem and joint swelling.   Skin:  Negative for color change, pallor and rash.   Allergic/Immunologic: Negative for food allergies.   Neurological:  Negative for speech difficulty and headaches.   Hematological:  Does not bruise/bleed easily.   Psychiatric/Behavioral:  Negative for agitation, confusion and sleep disturbance.        Vitals:    05/02/24 1408   BP: 122/78   Pulse: 97   Temp: 97.3 °F (36.3 °C)   SpO2: 92%       Physical Exam  Constitutional:       Appearance: He is well-developed.   HENT:      Head: Normocephalic.   Eyes:      Conjunctiva/sclera: Conjunctivae normal.   Cardiovascular:      Rate and Rhythm: Normal rate and regular rhythm.   Pulmonary:      Breath sounds: Normal breath sounds.   Abdominal:      General: Bowel sounds are normal.      Palpations: Abdomen is soft.    Musculoskeletal:         General: Normal range of motion.      Cervical back: Normal range of motion.      Comments: Patient in wheelchair receiving nasal cannula oxygen   Skin:     General: Skin is warm and dry.   Neurological:      Mental Status: He is alert and oriented to person, place, and time.   Psychiatric:         Behavior: Behavior normal.         ASSESSMENT   #1 GERD  #2 dysphagia  #3 history of esophageal ulcers      PLAN  Continue Carafate suspension 1 g before meals and at bedtime  Continue pantoprazole 40 mg twice daily  Patient to call with a progress report in 2 weeks time, if no better will consider upper endoscopic evaluation.      ICD-10-CM ICD-9-CM   1. Gastroesophageal reflux disease, unspecified whether esophagitis present  K21.9 530.81   2. Dysphagia, unspecified type  R13.10 787.20

## 2024-05-21 ENCOUNTER — HOSPITAL ENCOUNTER (INPATIENT)
Facility: HOSPITAL | Age: 84
LOS: 2 days | Discharge: NURSING, REHAB OR LONG-TERM CARE | DRG: 176 | End: 2024-05-23
Attending: EMERGENCY MEDICINE | Admitting: INTERNAL MEDICINE
Payer: MEDICARE

## 2024-05-21 ENCOUNTER — APPOINTMENT (OUTPATIENT)
Dept: CT IMAGING | Facility: HOSPITAL | Age: 84
DRG: 176 | End: 2024-05-21
Payer: MEDICARE

## 2024-05-21 DIAGNOSIS — I26.99 ACUTE PULMONARY EMBOLISM WITHOUT ACUTE COR PULMONALE, UNSPECIFIED PULMONARY EMBOLISM TYPE: Primary | ICD-10-CM

## 2024-05-21 DIAGNOSIS — I82.4Y1 ACUTE DEEP VEIN THROMBOSIS (DVT) OF PROXIMAL VEIN OF RIGHT LOWER EXTREMITY: ICD-10-CM

## 2024-05-21 LAB
ALBUMIN SERPL-MCNC: 3.6 G/DL (ref 3.5–5.2)
ALBUMIN/GLOB SERPL: 1.4 G/DL
ALP SERPL-CCNC: 69 U/L (ref 39–117)
ALT SERPL W P-5'-P-CCNC: 17 U/L (ref 1–41)
ANION GAP SERPL CALCULATED.3IONS-SCNC: 12 MMOL/L (ref 5–15)
APTT PPP: 26.7 SECONDS (ref 22.7–35.4)
AST SERPL-CCNC: 13 U/L (ref 1–40)
BASOPHILS # BLD AUTO: 0 10*3/MM3 (ref 0–0.2)
BASOPHILS NFR BLD AUTO: 0 % (ref 0–1.5)
BILIRUB SERPL-MCNC: 0.3 MG/DL (ref 0–1.2)
BUN SERPL-MCNC: 26 MG/DL (ref 8–23)
BUN/CREAT SERPL: 23 (ref 7–25)
CALCIUM SPEC-SCNC: 8.9 MG/DL (ref 8.6–10.5)
CHLORIDE SERPL-SCNC: 106 MMOL/L (ref 98–107)
CO2 SERPL-SCNC: 22 MMOL/L (ref 22–29)
CREAT SERPL-MCNC: 1.13 MG/DL (ref 0.76–1.27)
D DIMER PPP FEU-MCNC: >20 MCGFEU/ML (ref 0–0.86)
D-LACTATE SERPL-SCNC: 2.6 MMOL/L (ref 0.5–2)
DEPRECATED RDW RBC AUTO: 50.2 FL (ref 37–54)
EGFRCR SERPLBLD CKD-EPI 2021: 63.3 ML/MIN/1.73
EOSINOPHIL # BLD AUTO: 0 10*3/MM3 (ref 0–0.4)
EOSINOPHIL NFR BLD AUTO: 0 % (ref 0.3–6.2)
ERYTHROCYTE [DISTWIDTH] IN BLOOD BY AUTOMATED COUNT: 16.2 % (ref 12.3–15.4)
GLOBULIN UR ELPH-MCNC: 2.5 GM/DL
GLUCOSE SERPL-MCNC: 129 MG/DL (ref 65–99)
HCT VFR BLD AUTO: 32.8 % (ref 37.5–51)
HGB BLD-MCNC: 10.3 G/DL (ref 13–17.7)
IMM GRANULOCYTES # BLD AUTO: 0.04 10*3/MM3 (ref 0–0.05)
IMM GRANULOCYTES NFR BLD AUTO: 0.6 % (ref 0–0.5)
INR PPP: 1.04 (ref 0.9–1.1)
LYMPHOCYTES # BLD AUTO: 1.22 10*3/MM3 (ref 0.7–3.1)
LYMPHOCYTES NFR BLD AUTO: 16.9 % (ref 19.6–45.3)
MCH RBC QN AUTO: 27.2 PG (ref 26.6–33)
MCHC RBC AUTO-ENTMCNC: 31.4 G/DL (ref 31.5–35.7)
MCV RBC AUTO: 86.5 FL (ref 79–97)
MONOCYTES # BLD AUTO: 0.79 10*3/MM3 (ref 0.1–0.9)
MONOCYTES NFR BLD AUTO: 11 % (ref 5–12)
NEUTROPHILS NFR BLD AUTO: 5.16 10*3/MM3 (ref 1.7–7)
NEUTROPHILS NFR BLD AUTO: 71.5 % (ref 42.7–76)
NRBC BLD AUTO-RTO: 0 /100 WBC (ref 0–0.2)
NT-PROBNP SERPL-MCNC: 220 PG/ML (ref 0–1800)
PLATELET # BLD AUTO: 255 10*3/MM3 (ref 140–450)
PMV BLD AUTO: 8.9 FL (ref 6–12)
POTASSIUM SERPL-SCNC: 3.8 MMOL/L (ref 3.5–5.2)
PROT SERPL-MCNC: 6.1 G/DL (ref 6–8.5)
PROTHROMBIN TIME: 13.8 SECONDS (ref 11.7–14.2)
RBC # BLD AUTO: 3.79 10*6/MM3 (ref 4.14–5.8)
SODIUM SERPL-SCNC: 140 MMOL/L (ref 136–145)
TROPONIN T SERPL HS-MCNC: 55 NG/L
WBC NRBC COR # BLD AUTO: 7.21 10*3/MM3 (ref 3.4–10.8)

## 2024-05-21 PROCEDURE — 83880 ASSAY OF NATRIURETIC PEPTIDE: CPT | Performed by: EMERGENCY MEDICINE

## 2024-05-21 PROCEDURE — 84484 ASSAY OF TROPONIN QUANT: CPT | Performed by: EMERGENCY MEDICINE

## 2024-05-21 PROCEDURE — 25010000002 HEPARIN (PORCINE) 25000-0.45 UT/250ML-% SOLUTION: Performed by: EMERGENCY MEDICINE

## 2024-05-21 PROCEDURE — 85025 COMPLETE CBC W/AUTO DIFF WBC: CPT | Performed by: EMERGENCY MEDICINE

## 2024-05-21 PROCEDURE — 87040 BLOOD CULTURE FOR BACTERIA: CPT | Performed by: EMERGENCY MEDICINE

## 2024-05-21 PROCEDURE — 71275 CT ANGIOGRAPHY CHEST: CPT

## 2024-05-21 PROCEDURE — 85379 FIBRIN DEGRADATION QUANT: CPT | Performed by: EMERGENCY MEDICINE

## 2024-05-21 PROCEDURE — 80053 COMPREHEN METABOLIC PANEL: CPT | Performed by: EMERGENCY MEDICINE

## 2024-05-21 PROCEDURE — 85730 THROMBOPLASTIN TIME PARTIAL: CPT | Performed by: EMERGENCY MEDICINE

## 2024-05-21 PROCEDURE — 83605 ASSAY OF LACTIC ACID: CPT | Performed by: EMERGENCY MEDICINE

## 2024-05-21 PROCEDURE — 85610 PROTHROMBIN TIME: CPT | Performed by: EMERGENCY MEDICINE

## 2024-05-21 PROCEDURE — 25010000002 HEPARIN (PORCINE) PER 1000 UNITS: Performed by: EMERGENCY MEDICINE

## 2024-05-21 PROCEDURE — 25510000001 IOPAMIDOL PER 1 ML: Performed by: EMERGENCY MEDICINE

## 2024-05-21 PROCEDURE — 36415 COLL VENOUS BLD VENIPUNCTURE: CPT

## 2024-05-21 PROCEDURE — 99291 CRITICAL CARE FIRST HOUR: CPT

## 2024-05-21 RX ORDER — POLYETHYLENE GLYCOL 3350 17 G/17G
17 POWDER, FOR SOLUTION ORAL DAILY PRN
Status: DISCONTINUED | OUTPATIENT
Start: 2024-05-21 | End: 2024-05-23 | Stop reason: HOSPADM

## 2024-05-21 RX ORDER — CALCIUM CARBONATE 500 MG/1
1 TABLET, CHEWABLE ORAL AS NEEDED
Status: DISCONTINUED | OUTPATIENT
Start: 2024-05-21 | End: 2024-05-23 | Stop reason: HOSPADM

## 2024-05-21 RX ORDER — SODIUM CHLORIDE 0.9 % (FLUSH) 0.9 %
10 SYRINGE (ML) INJECTION AS NEEDED
Status: DISCONTINUED | OUTPATIENT
Start: 2024-05-21 | End: 2024-05-23 | Stop reason: HOSPADM

## 2024-05-21 RX ORDER — ACETAMINOPHEN 325 MG/1
650 TABLET ORAL EVERY 4 HOURS PRN
Status: DISCONTINUED | OUTPATIENT
Start: 2024-05-21 | End: 2024-05-23 | Stop reason: HOSPADM

## 2024-05-21 RX ORDER — GABAPENTIN 300 MG/1
300 CAPSULE ORAL 3 TIMES DAILY
Status: DISCONTINUED | OUTPATIENT
Start: 2024-05-22 | End: 2024-05-23 | Stop reason: HOSPADM

## 2024-05-21 RX ORDER — HEPARIN SODIUM 10000 [USP'U]/100ML
16.4 INJECTION, SOLUTION INTRAVENOUS
Status: DISCONTINUED | OUTPATIENT
Start: 2024-05-21 | End: 2024-05-23

## 2024-05-21 RX ORDER — BISACODYL 5 MG/1
5 TABLET, DELAYED RELEASE ORAL DAILY PRN
Status: DISCONTINUED | OUTPATIENT
Start: 2024-05-21 | End: 2024-05-23 | Stop reason: HOSPADM

## 2024-05-21 RX ORDER — ARFORMOTEROL TARTRATE 15 UG/2ML
15 SOLUTION RESPIRATORY (INHALATION)
Status: DISCONTINUED | OUTPATIENT
Start: 2024-05-22 | End: 2024-05-23 | Stop reason: HOSPADM

## 2024-05-21 RX ORDER — SUCRALFATE 1 G/1
1 TABLET ORAL 4 TIMES DAILY
Status: DISCONTINUED | OUTPATIENT
Start: 2024-05-22 | End: 2024-05-23 | Stop reason: HOSPADM

## 2024-05-21 RX ORDER — FLUOXETINE HYDROCHLORIDE 20 MG/1
20 CAPSULE ORAL DAILY
Status: DISCONTINUED | OUTPATIENT
Start: 2024-05-22 | End: 2024-05-23 | Stop reason: HOSPADM

## 2024-05-21 RX ORDER — SODIUM CHLORIDE 9 MG/ML
40 INJECTION, SOLUTION INTRAVENOUS AS NEEDED
Status: DISCONTINUED | OUTPATIENT
Start: 2024-05-21 | End: 2024-05-23 | Stop reason: HOSPADM

## 2024-05-21 RX ORDER — SODIUM CHLORIDE 0.9 % (FLUSH) 0.9 %
10 SYRINGE (ML) INJECTION EVERY 12 HOURS SCHEDULED
Status: DISCONTINUED | OUTPATIENT
Start: 2024-05-22 | End: 2024-05-23 | Stop reason: HOSPADM

## 2024-05-21 RX ORDER — ACETAMINOPHEN 650 MG/1
650 SUPPOSITORY RECTAL EVERY 4 HOURS PRN
Status: DISCONTINUED | OUTPATIENT
Start: 2024-05-21 | End: 2024-05-23 | Stop reason: HOSPADM

## 2024-05-21 RX ORDER — ONDANSETRON 2 MG/ML
4 INJECTION INTRAMUSCULAR; INTRAVENOUS EVERY 6 HOURS PRN
Status: DISCONTINUED | OUTPATIENT
Start: 2024-05-21 | End: 2024-05-23 | Stop reason: HOSPADM

## 2024-05-21 RX ORDER — AMOXICILLIN 250 MG
2 CAPSULE ORAL 2 TIMES DAILY PRN
Status: DISCONTINUED | OUTPATIENT
Start: 2024-05-21 | End: 2024-05-23 | Stop reason: HOSPADM

## 2024-05-21 RX ORDER — ALBUTEROL SULFATE 2.5 MG/3ML
2.5 SOLUTION RESPIRATORY (INHALATION) EVERY 6 HOURS PRN
Status: DISCONTINUED | OUTPATIENT
Start: 2024-05-21 | End: 2024-05-23 | Stop reason: HOSPADM

## 2024-05-21 RX ORDER — HEPARIN SODIUM 5000 [USP'U]/ML
80 INJECTION, SOLUTION INTRAVENOUS; SUBCUTANEOUS ONCE
Status: COMPLETED | OUTPATIENT
Start: 2024-05-21 | End: 2024-05-21

## 2024-05-21 RX ORDER — BISACODYL 10 MG
10 SUPPOSITORY, RECTAL RECTAL DAILY PRN
Status: DISCONTINUED | OUTPATIENT
Start: 2024-05-21 | End: 2024-05-23 | Stop reason: HOSPADM

## 2024-05-21 RX ORDER — PANTOPRAZOLE SODIUM 40 MG/1
40 TABLET, DELAYED RELEASE ORAL DAILY
Status: DISCONTINUED | OUTPATIENT
Start: 2024-05-22 | End: 2024-05-23 | Stop reason: HOSPADM

## 2024-05-21 RX ORDER — HEPARIN SODIUM 5000 [USP'U]/ML
40-80 INJECTION, SOLUTION INTRAVENOUS; SUBCUTANEOUS EVERY 6 HOURS PRN
Status: DISCONTINUED | OUTPATIENT
Start: 2024-05-21 | End: 2024-05-23

## 2024-05-21 RX ORDER — NITROGLYCERIN 0.4 MG/1
0.4 TABLET SUBLINGUAL
Status: DISCONTINUED | OUTPATIENT
Start: 2024-05-21 | End: 2024-05-23 | Stop reason: HOSPADM

## 2024-05-21 RX ORDER — ACETAMINOPHEN 160 MG/5ML
650 SOLUTION ORAL EVERY 4 HOURS PRN
Status: DISCONTINUED | OUTPATIENT
Start: 2024-05-21 | End: 2024-05-23 | Stop reason: HOSPADM

## 2024-05-21 RX ORDER — TIZANIDINE 4 MG/1
2 TABLET ORAL DAILY PRN
Status: DISCONTINUED | OUTPATIENT
Start: 2024-05-21 | End: 2024-05-23 | Stop reason: HOSPADM

## 2024-05-21 RX ADMIN — IOPAMIDOL 95 ML: 755 INJECTION, SOLUTION INTRAVENOUS at 21:41

## 2024-05-21 RX ADMIN — HEPARIN SODIUM 7300 UNITS: 5000 INJECTION INTRAVENOUS; SUBCUTANEOUS at 22:31

## 2024-05-21 RX ADMIN — HEPARIN SODIUM 16.4 UNITS/KG/HR: 10000 INJECTION, SOLUTION INTRAVENOUS at 22:33

## 2024-05-22 ENCOUNTER — APPOINTMENT (OUTPATIENT)
Dept: CARDIOLOGY | Facility: HOSPITAL | Age: 84
DRG: 176 | End: 2024-05-22
Payer: MEDICARE

## 2024-05-22 LAB
ANION GAP SERPL CALCULATED.3IONS-SCNC: 10 MMOL/L (ref 5–15)
APTT PPP: 159.4 SECONDS (ref 22.7–35.4)
APTT PPP: 82.6 SECONDS (ref 22.7–35.4)
BASOPHILS # BLD AUTO: 0.01 10*3/MM3 (ref 0–0.2)
BASOPHILS NFR BLD AUTO: 0.1 % (ref 0–1.5)
BH CV LOW VAS LEFT EXTERNAL ILIAC AUGMENT: NORMAL
BH CV LOW VAS LEFT EXTERNAL ILIAC COMPRESS: NORMAL
BH CV LOW VAS RIGHT COMMON FEMORAL SPONT: 1
BH CV LOW VAS RIGHT DISTAL FEMORAL SPONT: 1
BH CV LOW VAS RIGHT EXTERNAL ILIAC SPONT: 1
BH CV LOW VAS RIGHT GASTRONEMIUS VESSEL: 1
BH CV LOW VAS RIGHT GREATER SAPH AK VESSEL: 1
BH CV LOW VAS RIGHT MID FEMORAL SPONT: 1
BH CV LOW VAS RIGHT POPLITEAL SPONT: 1
BH CV LOW VAS RIGHT POSTERIOR TIBIAL VESSEL: 1
BH CV LOW VAS RIGHT PROFUNDA FEMORAL SPONT: 1
BH CV LOW VAS RIGHT PROXIMAL FEMORAL SPONT: 1
BH CV LOW VAS RIGHT SAPHENOFEMORAL JUNCTION SPONT: 1
BH CV LOWER VASCULAR LEFT COMMON FEMORAL AUGMENT: NORMAL
BH CV LOWER VASCULAR LEFT COMMON FEMORAL COMPETENT: NORMAL
BH CV LOWER VASCULAR LEFT COMMON FEMORAL COMPRESS: NORMAL
BH CV LOWER VASCULAR LEFT COMMON FEMORAL PHASIC: NORMAL
BH CV LOWER VASCULAR LEFT COMMON FEMORAL SPONT: NORMAL
BH CV LOWER VASCULAR LEFT EXTERNAL ILIAC COMPETENT: NORMAL
BH CV LOWER VASCULAR LEFT EXTERNAL ILIAC PHASIC: NORMAL
BH CV LOWER VASCULAR LEFT EXTERNAL ILIAC SPONT: NORMAL
BH CV LOWER VASCULAR RIGHT COMMON FEMORAL AUGMENT: NORMAL
BH CV LOWER VASCULAR RIGHT COMMON FEMORAL COMPETENT: NORMAL
BH CV LOWER VASCULAR RIGHT COMMON FEMORAL COMPRESS: NORMAL
BH CV LOWER VASCULAR RIGHT COMMON FEMORAL PHASIC: NORMAL
BH CV LOWER VASCULAR RIGHT COMMON FEMORAL SPONT: NORMAL
BH CV LOWER VASCULAR RIGHT COMMON FEMORAL THROMBUS: NORMAL
BH CV LOWER VASCULAR RIGHT DISTAL FEMORAL COMPRESS: NORMAL
BH CV LOWER VASCULAR RIGHT DISTAL FEMORAL THROMBUS: NORMAL
BH CV LOWER VASCULAR RIGHT EXTERNAL ILIAC COMPRESS: NORMAL
BH CV LOWER VASCULAR RIGHT EXTERNAL ILIAC THROMBUS: NORMAL
BH CV LOWER VASCULAR RIGHT GASTRONEMIUS COMPRESS: NORMAL
BH CV LOWER VASCULAR RIGHT GASTRONEMIUS THROMBUS: NORMAL
BH CV LOWER VASCULAR RIGHT GREATER SAPH AK COMPRESS: NORMAL
BH CV LOWER VASCULAR RIGHT GREATER SAPH AK THROMBUS: NORMAL
BH CV LOWER VASCULAR RIGHT GREATER SAPH BK COMPRESS: NORMAL
BH CV LOWER VASCULAR RIGHT MID FEMORAL AUGMENT: NORMAL
BH CV LOWER VASCULAR RIGHT MID FEMORAL COMPETENT: NORMAL
BH CV LOWER VASCULAR RIGHT MID FEMORAL COMPRESS: NORMAL
BH CV LOWER VASCULAR RIGHT MID FEMORAL PHASIC: NORMAL
BH CV LOWER VASCULAR RIGHT MID FEMORAL SPONT: NORMAL
BH CV LOWER VASCULAR RIGHT MID FEMORAL THROMBUS: NORMAL
BH CV LOWER VASCULAR RIGHT POPLITEAL AUGMENT: NORMAL
BH CV LOWER VASCULAR RIGHT POPLITEAL COMPETENT: NORMAL
BH CV LOWER VASCULAR RIGHT POPLITEAL COMPRESS: NORMAL
BH CV LOWER VASCULAR RIGHT POPLITEAL PHASIC: NORMAL
BH CV LOWER VASCULAR RIGHT POPLITEAL SPONT: NORMAL
BH CV LOWER VASCULAR RIGHT POPLITEAL THROMBUS: NORMAL
BH CV LOWER VASCULAR RIGHT POSTERIOR TIBIAL COMPRESS: NORMAL
BH CV LOWER VASCULAR RIGHT POSTERIOR TIBIAL THROMBUS: NORMAL
BH CV LOWER VASCULAR RIGHT PROFUNDA FEMORAL COMPRESS: NORMAL
BH CV LOWER VASCULAR RIGHT PROFUNDA FEMORAL THROMBUS: NORMAL
BH CV LOWER VASCULAR RIGHT PROXIMAL FEMORAL COMPRESS: NORMAL
BH CV LOWER VASCULAR RIGHT PROXIMAL FEMORAL THROMBUS: NORMAL
BH CV LOWER VASCULAR RIGHT SAPHENOFEMORAL JUNCTION COMPRESS: NORMAL
BH CV LOWER VASCULAR RIGHT SAPHENOFEMORAL JUNCTION THROMBUS: NORMAL
BH CV VAS PRELIMINARY FINDINGS SCRIPTING: 1
BUN SERPL-MCNC: 22 MG/DL (ref 8–23)
BUN/CREAT SERPL: 26.5 (ref 7–25)
CALCIUM SPEC-SCNC: 8.3 MG/DL (ref 8.6–10.5)
CHLORIDE SERPL-SCNC: 107 MMOL/L (ref 98–107)
CO2 SERPL-SCNC: 23 MMOL/L (ref 22–29)
CREAT SERPL-MCNC: 0.83 MG/DL (ref 0.76–1.27)
D-LACTATE SERPL-SCNC: 0.8 MMOL/L (ref 0.5–2)
DEPRECATED RDW RBC AUTO: 50.4 FL (ref 37–54)
EGFRCR SERPLBLD CKD-EPI 2021: 85.2 ML/MIN/1.73
EOSINOPHIL # BLD AUTO: 0 10*3/MM3 (ref 0–0.4)
EOSINOPHIL NFR BLD AUTO: 0 % (ref 0.3–6.2)
ERYTHROCYTE [DISTWIDTH] IN BLOOD BY AUTOMATED COUNT: 16 % (ref 12.3–15.4)
GEN 5 2HR TROPONIN T REFLEX: 47 NG/L
GLUCOSE SERPL-MCNC: 96 MG/DL (ref 65–99)
HCT VFR BLD AUTO: 31.7 % (ref 37.5–51)
HGB BLD-MCNC: 10 G/DL (ref 13–17.7)
IMM GRANULOCYTES # BLD AUTO: 0.06 10*3/MM3 (ref 0–0.05)
IMM GRANULOCYTES NFR BLD AUTO: 0.8 % (ref 0–0.5)
LYMPHOCYTES # BLD AUTO: 1.64 10*3/MM3 (ref 0.7–3.1)
LYMPHOCYTES NFR BLD AUTO: 23.2 % (ref 19.6–45.3)
MCH RBC QN AUTO: 27.4 PG (ref 26.6–33)
MCHC RBC AUTO-ENTMCNC: 31.5 G/DL (ref 31.5–35.7)
MCV RBC AUTO: 86.8 FL (ref 79–97)
MONOCYTES # BLD AUTO: 0.71 10*3/MM3 (ref 0.1–0.9)
MONOCYTES NFR BLD AUTO: 10.1 % (ref 5–12)
NEUTROPHILS NFR BLD AUTO: 4.64 10*3/MM3 (ref 1.7–7)
NEUTROPHILS NFR BLD AUTO: 65.8 % (ref 42.7–76)
NRBC BLD AUTO-RTO: 0 /100 WBC (ref 0–0.2)
PLATELET # BLD AUTO: 245 10*3/MM3 (ref 140–450)
PMV BLD AUTO: 8.9 FL (ref 6–12)
POTASSIUM SERPL-SCNC: 3.7 MMOL/L (ref 3.5–5.2)
RBC # BLD AUTO: 3.65 10*6/MM3 (ref 4.14–5.8)
SODIUM SERPL-SCNC: 140 MMOL/L (ref 136–145)
TROPONIN T DELTA: -8 NG/L
WBC NRBC COR # BLD AUTO: 7.06 10*3/MM3 (ref 3.4–10.8)

## 2024-05-22 PROCEDURE — 99223 1ST HOSP IP/OBS HIGH 75: CPT | Performed by: SURGERY

## 2024-05-22 PROCEDURE — 80048 BASIC METABOLIC PNL TOTAL CA: CPT | Performed by: NURSE PRACTITIONER

## 2024-05-22 PROCEDURE — 93971 EXTREMITY STUDY: CPT | Performed by: SURGERY

## 2024-05-22 PROCEDURE — 85730 THROMBOPLASTIN TIME PARTIAL: CPT | Performed by: STUDENT IN AN ORGANIZED HEALTH CARE EDUCATION/TRAINING PROGRAM

## 2024-05-22 PROCEDURE — 25010000002 HEPARIN (PORCINE) 25000-0.45 UT/250ML-% SOLUTION: Performed by: EMERGENCY MEDICINE

## 2024-05-22 PROCEDURE — 93971 EXTREMITY STUDY: CPT

## 2024-05-22 PROCEDURE — 36415 COLL VENOUS BLD VENIPUNCTURE: CPT | Performed by: EMERGENCY MEDICINE

## 2024-05-22 PROCEDURE — 94799 UNLISTED PULMONARY SVC/PX: CPT

## 2024-05-22 PROCEDURE — 94640 AIRWAY INHALATION TREATMENT: CPT

## 2024-05-22 PROCEDURE — 94762 N-INVAS EAR/PLS OXIMTRY CONT: CPT

## 2024-05-22 PROCEDURE — 84484 ASSAY OF TROPONIN QUANT: CPT | Performed by: EMERGENCY MEDICINE

## 2024-05-22 PROCEDURE — 92610 EVALUATE SWALLOWING FUNCTION: CPT

## 2024-05-22 PROCEDURE — 97162 PT EVAL MOD COMPLEX 30 MIN: CPT

## 2024-05-22 PROCEDURE — 85025 COMPLETE CBC W/AUTO DIFF WBC: CPT | Performed by: NURSE PRACTITIONER

## 2024-05-22 PROCEDURE — 85730 THROMBOPLASTIN TIME PARTIAL: CPT | Performed by: EMERGENCY MEDICINE

## 2024-05-22 PROCEDURE — 83605 ASSAY OF LACTIC ACID: CPT | Performed by: EMERGENCY MEDICINE

## 2024-05-22 PROCEDURE — 94761 N-INVAS EAR/PLS OXIMETRY MLT: CPT

## 2024-05-22 PROCEDURE — 97530 THERAPEUTIC ACTIVITIES: CPT

## 2024-05-22 RX ORDER — GUAIFENESIN 600 MG/1
1200 TABLET, EXTENDED RELEASE ORAL 2 TIMES DAILY
COMMUNITY

## 2024-05-22 RX ADMIN — GABAPENTIN 300 MG: 300 CAPSULE ORAL at 17:30

## 2024-05-22 RX ADMIN — Medication 10 ML: at 21:11

## 2024-05-22 RX ADMIN — GABAPENTIN 300 MG: 300 CAPSULE ORAL at 10:20

## 2024-05-22 RX ADMIN — GABAPENTIN 300 MG: 300 CAPSULE ORAL at 21:11

## 2024-05-22 RX ADMIN — ANTACID TABLETS 1 TABLET: 500 TABLET, CHEWABLE ORAL at 14:30

## 2024-05-22 RX ADMIN — TIOTROPIUM BROMIDE INHALATION SPRAY 2 PUFF: 3.12 SPRAY, METERED RESPIRATORY (INHALATION) at 10:59

## 2024-05-22 RX ADMIN — SUCRALFATE 1 G: 1 TABLET ORAL at 10:20

## 2024-05-22 RX ADMIN — SUCRALFATE 1 G: 1 TABLET ORAL at 21:11

## 2024-05-22 RX ADMIN — ARFORMOTEROL TARTRATE 15 MCG: 15 SOLUTION RESPIRATORY (INHALATION) at 19:04

## 2024-05-22 RX ADMIN — Medication 10 ML: at 10:21

## 2024-05-22 RX ADMIN — PANTOPRAZOLE SODIUM 40 MG: 40 TABLET, DELAYED RELEASE ORAL at 10:20

## 2024-05-22 RX ADMIN — FLUOXETINE HYDROCHLORIDE 20 MG: 20 CAPSULE ORAL at 10:20

## 2024-05-22 RX ADMIN — HEPARIN SODIUM 13.4 UNITS/KG/HR: 10000 INJECTION, SOLUTION INTRAVENOUS at 17:35

## 2024-05-22 RX ADMIN — ARFORMOTEROL TARTRATE 15 MCG: 15 SOLUTION RESPIRATORY (INHALATION) at 10:58

## 2024-05-22 RX ADMIN — SUCRALFATE 1 G: 1 TABLET ORAL at 13:11

## 2024-05-22 RX ADMIN — SUCRALFATE 1 G: 1 TABLET ORAL at 17:30

## 2024-05-22 NOTE — DISCHARGE PLACEMENT REQUEST
"Tony Casey (84 y.o. Male)       Date of Birth   1940    Social Security Number       Address   [address] [place] [address]    Home Phone   [phone]    MRN   [id number]       Restorationism   Oriental orthodox    Marital Status   Single                            Admission Date   5/21/24    Admission Type   Emergency    Admitting Provider   Nolan Scott MD    Attending Provider   Roderick Foreman MD    Department, Room/Bed   [address], S[address]/1       Discharge Date       Discharge Disposition       Discharge Destination                                 Attending Provider: Roderick Foreman MD    Allergies: Daliresp [Roflumilast], Latex, Sulfa Antibiotics    Isolation: None   Infection: None   Code Status: No CPR    Ht: 180.3 cm (70.98\")   Wt: 91.2 kg (201 lb)    Admission Cmt: None   Principal Problem: Pulmonary embolism [I26.99]                   Active Insurance as of 5/21/2024       Primary Coverage       Payor Plan Insurance Group Employer/Plan Group    MEDICARE MEDICARE A & B        Payor Plan Address Payor Plan Phone Number Payor Plan Fax Number Effective Dates    [address] [phone]  5/1/2003 - None Entered    [facility] [id number]         Subscriber Name Subscriber Birth Date Member ID       TONY CASEY 1940 [id number]               Secondary Coverage       Payor Plan Insurance Group Employer/Plan Group     FOR LIFE  FOR LIFE  SUP         Payor Plan Address Payor Plan Phone Number Payor Plan Fax Number Effective Dates    [address] [phone]  3/10/2016 - None Entered    [facility] [address]         Subscriber Name Subscriber Birth Date Member ID       TONY CASEY 1940 [id number]                     Emergency Contacts        (Rel.) Home Phone Work Phone Mobile Phone    VICKY CASEY (Son) [phone] -- [phone]    HoracioFrederick isaac (Son) -- -- [phone]    " Ricardo Leonard (Son) [phone] -- [phone]

## 2024-05-22 NOTE — PLAN OF CARE
Goal Outcome Evaluation:  Plan of Care Reviewed With: patient        Progress: no change  Outcome Evaluation: New Admission this shift.  VSS. No pain at this time.  BLE edema noted.  Hep gtt.  Up with assist. Will continue to monitor labs and reassess as needed.

## 2024-05-22 NOTE — CONSULTS
Nutrition Services    Patient Name:  Tony Leonard  YOB: 1940  MRN: [id number]  Admit Date:  5/21/2024    Assessment Date:  05/22/24    Summary: Nurse Admission Screen Consult    Pt is a 84 y.o. male admitted for pulmonary embolism. History of GERD, COPD, CHF, urinary retention, prostate cancer, paroxysmal A-fib, hypertension, anemia. Pt presented with right leg swelling and shortness of breath.    Current diet is healthy heart with thin liquids. 100% po intake at breakfast this morning. Noted hx of chronic dysphagia per SLP, ST following. Pt LAURA at time of visit, will follow up post speech eval. Wt hx indicates 36 lb wt gain x 5 months per stated wt, recommend standing scale wt. Significant RLE swelling to thigh noted per MD.    Plan/Recommend:  Diet per SLP recs  Standing scale wt  Encourage good po intake  Will continue to monitor intake, labs, wt    RD to follow.    CLINICAL NUTRITION ASSESSMENT      Reason for Assessment Nurse Admission Screen     Diagnosis/Problem   Pulmonary Embolism  Anemia  HTN/CHF  COPD   Medical/Surgical History Past Medical History:   Diagnosis Date    ADHD (attention deficit hyperactivity disorder)     Bronchiectasis     CHF (congestive heart failure)     Colon polyp     COPD (chronic obstructive pulmonary disease)     Diverticulosis     Hard of hearing     On home oxygen therapy     2 LITERS    Pneumonia     Prostate cancer 04/22/2019    Spinal stenosis, lumbar region with neurogenic claudication 08/02/2022       Past Surgical History:   Procedure Laterality Date    BRONCHOSCOPY N/A 04/30/2016    Procedure: BRONCHOSCOPY with BAL of right lower lobe and left  lower lobe.  ;  Surgeon: Nando Diaz MD;  Location: [facility] ENDOSCOPY;  Service:     BRONCHOSCOPY N/A 04/28/2017    Procedure: BRONCHOSCOPY;  Surgeon: Katharina Salter MD;  Location: [facility] ENDOSCOPY;  Service:     BRONCHOSCOPY Bilateral 06/29/2017    Procedure: BRONCHOSCOPY WITH WASHINGS;  Surgeon: Katharina GUILLAUME  MD Gaetano;  Location: [facility] ENDOSCOPY;  Service:     BRONCHOSCOPY N/A 01/22/2018    Procedure: BRONCHOSCOPY AT BEDSIDE with BAL;  Surgeon: Katharina Salter MD;  [facility];  Service:     BRONCHOSCOPY N/A 01/30/2018    Procedure: BRONCHOSCOPY with BAL and washing;  Surgeon: Shreyas Wild MD;  [facility];  Service:     BRONCHOSCOPY Bilateral 04/24/2018    Procedure: BRONCHOSCOPY WITH WASHING;  Surgeon: Katharina Salter MD;  [facility];  Service: Pulmonary    BRONCHOSCOPY N/A 12/28/2019    Procedure: BRONCHOSCOPY with bilateral washing;  Surgeon: Katharina Salter MD;  [facility];  Service: Pulmonary    BRONCHOSCOPY Bilateral 01/04/2023    Procedure: BRONCHOSCOPY with lavage;  Surgeon: Katharina Salter MD;  Location: [facility] ENDOSCOPY;  Service: Pulmonary;  Laterality: Bilateral;  mucus plugging    CARDIAC CATHETERIZATION N/A 01/29/2023    Procedure: Left Heart Cath;  Surgeon: Johnnie Ang MD;  Location: [facility] CATH INVASIVE LOCATION;  Service: Cardiology;  Laterality: N/A;    CARDIAC CATHETERIZATION N/A 01/29/2023    Procedure: Coronary angiography;  Surgeon: Johnnie Ang MD;  Location: [facility] CATH INVASIVE LOCATION;  Service: Cardiology;  Laterality: N/A;    COLONOSCOPY  05/17/2013    eh, ih, tort, sig tics    COLONOSCOPY N/A 05/10/2019    Non-thrombosed external hemorrhoids found on perianal exam, Diverticulosis, Tortuous colon, One 5 mm polyp in the mid ascending colon, IH. Path: Tubular adenoma.     COLONOSCOPY N/A 09/24/2022    Procedure: COLONOSCOPY to cecum and TI with argon plasma coagulation.;  Surgeon: Bruce Black MD;  Location: [facility] ENDOSCOPY;  Service: Gastroenterology;  Laterality: N/A;  pre- GI bleeding  post- radiation proctitis, diverticulosis    ENDOSCOPY  03/19/2015    z line irreg, hh    ENDOSCOPY N/A 09/24/2022    Procedure: ESOPHAGOGASTRODUODENOSCOPY;  Surgeon: Bruce Black MD;  Location: [facility] ENDOSCOPY;   "Service: Gastroenterology;  Laterality: N/A;  pre- GI bleeding  post- esophagitis, hiatal hernia    HIP OPEN REDUCTION Right 01/17/2018    Procedure: HIP OPEN REDUCTION INTERNAL FIXATION WITH DYNAMIC HIP SCREW;  Surgeon: Les Black MD;  Location: [facility];  Service:     SPINE SURGERY      TONSILLECTOMY      TOTAL HIP ARTHROPLASTY REVISION Right 09/23/2019    Procedure: HIP  REVISION RIGHT;  Surgeon: Isauro Warner MD;  Location: [facility];  Service: Orthopedics        Anthropometrics        Current Height  Current Weight  BMI kg/m2 Height: 180.3 cm (70.98\")  Weight: 91.2 kg (201 lb) (05/21/24 2028)  Body mass index is 28.05 kg/m².   Adjusted BMI (if applicable)    BMI Category Overweight (25 - 29.9)   Ideal Body Weight (IBW) 165 lb   Usual Body Weight (UBW) unknown   Weight Trend Gain   Weight History Wt Readings from Last 30 Encounters:   05/21/24 2028 91.2 kg (201 lb)   05/02/24 1408 91.2 kg (201 lb)   04/19/24 0346 97.7 kg (215 lb 6.2 oz)   04/18/24 2331 81.6 kg (180 lb)   04/09/24 0331 86.1 kg (189 lb 13.1 oz)   04/08/24 2049 85.6 kg (188 lb 11.4 oz)   02/14/24 0619 85.6 kg (188 lb 11.4 oz)   02/13/24 0500 84.2 kg (185 lb 10 oz)   02/12/24 0533 82.6 kg (182 lb 1.6 oz)   02/11/24 0645 82.1 kg (181 lb)   02/10/24 1543 85.7 kg (189 lb)   02/06/24 2029 85.7 kg (189 lb)   12/26/23 0710 74.8 kg (165 lb)   12/22/23 1425 74.8 kg (165 lb)   12/22/23 0711 74.9 kg (165 lb 3.2 oz)   12/21/23 2107 85.7 kg (189 lb)   10/19/23 1007 84.4 kg (186 lb)   10/18/23 1924 81.6 kg (180 lb)   09/07/23 1320 88.8 kg (195 lb 12.8 oz)   08/17/23 1013 90.7 kg (200 lb)   07/11/23 2033 81.6 kg (180 lb)   07/05/23 1104 79.8 kg (176 lb)   05/23/23 1559 79.8 kg (176 lb)   05/11/23 0555 80.3 kg (177 lb)   05/10/23 1029 80.3 kg (177 lb 0.5 oz)   05/10/23 0954 80.3 kg (177 lb 0.5 oz)   05/10/23 0148 78.5 kg (173 lb)   05/02/23 1415 78.7 kg (173 lb 9.6 oz)   04/17/23 1412 81.6 kg (180 lb)   04/14/23 1313 84.4 kg (186 lb)   04/03/23 " 0948 83.9 kg (185 lb)   02/17/23 1100 82.3 kg (181 lb 6.4 oz)   01/31/23 0534 80.2 kg (176 lb 12.8 oz)   01/30/23 1007 79.8 kg (176 lb)   01/30/23 0555 80 kg (176 lb 6.4 oz)   01/28/23 2344 79.4 kg (175 lb)   01/16/23 1402 79.8 kg (176 lb)   12/29/22 1136 79.8 kg (176 lb)   12/10/22 1455 79.8 kg (176 lb)   12/10/22 0427 79.8 kg (175 lb 14.4 oz)   12/09/22 2317 81.6 kg (180 lb)   12/01/22 0530 91.3 kg (201 lb 4.5 oz)   11/30/22 0517 92.6 kg (204 lb 2.3 oz)   11/28/22 0400 92.6 kg (204 lb 2.3 oz)   11/27/22 0600 89.2 kg (196 lb 10.4 oz)   11/26/22 0620 88 kg (194 lb 0.1 oz)   11/26/22 0241 81.6 kg (180 lb)   11/21/22 1514 77.9 kg (171 lb 11.8 oz)   11/22/22 0838 77.6 kg (171 lb)   11/22/22 0838 77.6 kg (171 lb)   11/14/22 0006 81.4 kg (179 lb 6.4 oz)   11/13/22 1627 81.6 kg (180 lb)      --  Labs       Pertinent Labs    Results from last 7 days   Lab Units 05/22/24  0807 05/21/24  2040   SODIUM mmol/L 140 140   POTASSIUM mmol/L 3.7 3.8   CHLORIDE mmol/L 107 106   CO2 mmol/L 23.0 22.0   BUN mg/dL 22 26*   CREATININE mg/dL 0.83 1.13   CALCIUM mg/dL 8.3* 8.9   BILIRUBIN mg/dL  --  0.3   ALK PHOS U/L  --  69   ALT (SGPT) U/L  --  17   AST (SGOT) U/L  --  13   GLUCOSE mg/dL 96 129*     Results from last 7 days   Lab Units 05/22/24  0807 05/21/24 2040   HEMOGLOBIN g/dL 10.0* 10.3*   HEMATOCRIT % 31.7* 32.8*   WBC 10*3/mm3 7.06 7.21   ALBUMIN g/dL  --  3.6     Results from last 7 days   Lab Units 05/22/24  0807 05/21/24 2040   INR   --  1.04   APTT seconds  --  26.7   PLATELETS 10*3/mm3 245 255     COVID19   Date Value Ref Range Status   02/06/2024 Not Detected Not Detected - Ref. Range Final     Lab Results   Component Value Date    HGBA1C 5.39 04/04/2017          Medications           Scheduled Medications arformoterol, 15 mcg, Nebulization, BID - RT   And  tiotropium bromide monohydrate, 2 puff, Inhalation, Daily - RT  FLUoxetine, 20 mg, Oral, Daily  gabapentin, 300 mg, Oral, TID  pantoprazole, 40 mg, Oral,  Daily  sodium chloride, 10 mL, Intravenous, Q12H  sucralfate, 1 g, Oral, 4x Daily       Infusions heparin, 16.4 Units/kg/hr, Last Rate: 16.4 Units/kg/hr (05/21/24 9557)       PRN Medications   acetaminophen **OR** acetaminophen **OR** acetaminophen    albuterol    senna-docusate sodium **AND** polyethylene glycol **AND** bisacodyl **AND** bisacodyl    calcium carbonate    heparin (porcine)    nitroglycerin    ondansetron    [COMPLETED] Insert Peripheral IV **AND** sodium chloride    sodium chloride    sodium chloride    tiZANidine     Physical Findings          General Findings obese, oriented, room air   Oral/Mouth Cavity WDL   Edema  not assessed   Gastrointestinal WDL   Skin  skin intact   Tubes/Drains/Lines none   NFPE Not indicated at this time   --  Current Nutrition Orders & Evaluation of Intake       Oral Nutrition     Food Allergies NKFA   Current PO Diet Diet: Cardiac; Healthy Heart (2-3 Na+); Fluid Consistency: Thin (IDDSI 0)   Supplement n/a   PO Evaluation     % PO Intake 100%    Factors Affecting Intake: swallow impairment     Estimated Requirements         Weight used  91.2 kg    Calories  1824 kcals - 2006 kcals (20-22 kcal/kg)    Protein  73 - 91 g (0.8 gm/kg, 1.0 gm/kg)    Fluid   (Defer to physician)     PES STATEMENT / NUTRITION DIAGNOSIS      Nutrition Dx Problem  Problem: Swallowing Difficulty  Etiology: Medical Diagnosis - hx of chronic dysphagia    Signs/Symptoms: Report/Observation     NUTRITION INTERVENTION / PLAN OF CARE      Intervention Goal(s) Establish goals of care, Reduce/improve symptoms, Meet estimated needs, Tolerate PO , Appropriate weight loss, and PO intake goal %: 75%         RD Intervention/Action Interview for preferences, Encourage intake, and Continue to monitor   --      Prescription/Orders:       PO Diet       Supplements       Enteral Nutrition       Parenteral Nutrition    New Prescription Ordered? Continue same per protocol, No changes at this time   --       Monitor/Evaluation Per protocol   Discharge Plan/Needs Pending clinical course   --    RD to follow per protocol.      Electronically signed by:  Shawna Miles  05/22/24 08:52 EDT

## 2024-05-22 NOTE — THERAPY EVALUATION
Patient Name: Tony Leonard  : 1940    MRN: [id number]                              Today's Date: 2024       Admit Date: 2024    Visit Dx:     ICD-10-CM ICD-9-CM   1. Acute pulmonary embolism without acute cor pulmonale, unspecified pulmonary embolism type  I26.99 415.19   2. Acute deep vein thrombosis (DVT) of proximal vein of right lower extremity  I82.4Y1 453.41     Patient Active Problem List   Diagnosis    Thrush, oral    ADD (attention deficit disorder)    Hemorrhoids    Bronchiectasis with acute lower respiratory infection    Diverticul disease small and large intestine, no perforati or abscess    Benign non-nodular prostatic hyperplasia without lower urinary tract symptoms    Gastroesophageal reflux disease    Malaise and fatigue    Environmental allergies    Hemoptysis    Dyslipidemia    Primary osteoarthritis involving multiple joints    Pneumonia of right lower lobe due to infectious organism    Abnormal EKG    COPD (chronic obstructive pulmonary disease)    Mild malnutrition    Acute on chronic respiratory failure with hypoxia    Fracture, intertrochanteric, right femur, closed, initial encounter    Chronic diastolic CHF (congestive heart failure)    Hematoma of frontal scalp    Postoperative anemia due to acute blood loss    Urinary retention    Hemorrhagic disorder due to circulating anticoagulants    History of fracture of right hip    Closed fracture of right hip with routine healing    Chronic low back pain with sciatica    Change in bowel function    Rectal bleeding    Prostate cancer    On home oxygen therapy    Acute viral bronchitis    Peripheral neuropathy    Plantar fasciitis    HTN (hypertension)    COPD exacerbation    Bilateral lower extremity edema    Microscopic hematuria    Acute midline low back pain with right-sided sciatica    Spinal stenosis, lumbar region with neurogenic claudication    Compression deformity of vertebra    Rectal bleed    Esophagitis     Angiectasia    Hiatal hernia    Overweight (BMI 25.0-29.9)    Leukocytosis    Bilateral hip pain    Elevated troponin level not due myocardial infarction    Nocturnal hypoxia    Pneumonia of right upper lobe due to infectious organism    NSTEMI (non-ST elevated myocardial infarction)    Chronic respiratory failure with hypoxia    Paroxysmal atrial fibrillation    Acute exacerbation of chronic obstructive pulmonary disease (COPD)    Anemia    Non-compliant patient    Hypokalemia    Spondylolisthesis of lumbar region    Elevated troponin    Rhabdomyolysis    Multiple contusions    Fall    Dizziness    Contusion of hip    Pulmonary embolism     Past Medical History:   Diagnosis Date    ADHD (attention deficit hyperactivity disorder)     Bronchiectasis     CHF (congestive heart failure)     Colon polyp     COPD (chronic obstructive pulmonary disease)     Diverticulosis     Hard of hearing     On home oxygen therapy     2 LITERS    Pneumonia     Prostate cancer 04/22/2019    Spinal stenosis, lumbar region with neurogenic claudication 08/02/2022     Past Surgical History:   Procedure Laterality Date    BRONCHOSCOPY N/A 04/30/2016    Procedure: BRONCHOSCOPY with BAL of right lower lobe and left  lower lobe.  ;  Surgeon: Nando Diaz MD;  Location: [facility] ENDOSCOPY;  Service:     BRONCHOSCOPY N/A 04/28/2017    Procedure: BRONCHOSCOPY;  Surgeon: Katharina Salter MD;  Location: [facility] ENDOSCOPY;  Service:     BRONCHOSCOPY Bilateral 06/29/2017    Procedure: BRONCHOSCOPY WITH WASHINGS;  Surgeon: Katharina Salter MD;  Location: [facility] ENDOSCOPY;  Service:     BRONCHOSCOPY N/A 01/22/2018    Procedure: BRONCHOSCOPY AT BEDSIDE with BAL;  Surgeon: Katharina Salter MD;  Location: [facility] ENDOSCOPY;  Service:     BRONCHOSCOPY N/A 01/30/2018    Procedure: BRONCHOSCOPY with BAL and washing;  Surgeon: Shreyas Wild MD;  Location: [facility] ENDOSCOPY;  Service:     BRONCHOSCOPY Bilateral 04/24/2018    Procedure: BRONCHOSCOPY WITH WASHING;   Surgeon: Katharina Salter MD;  Location: [facility] ENDOSCOPY;  Service: Pulmonary    BRONCHOSCOPY N/A 12/28/2019    Procedure: BRONCHOSCOPY with bilateral washing;  Surgeon: Katharina Salter MD;  [facility];  Service: Pulmonary    BRONCHOSCOPY Bilateral 01/04/2023    Procedure: BRONCHOSCOPY with lavage;  Surgeon: Katharina Salter MD;  Location: [facility] ENDOSCOPY;  Service: Pulmonary;  Laterality: Bilateral;  mucus plugging    CARDIAC CATHETERIZATION N/A 01/29/2023    Procedure: Left Heart Cath;  Surgeon: Johnnie Ang MD;  Location: [facility] CATH INVASIVE LOCATION;  Service: Cardiology;  Laterality: N/A;    CARDIAC CATHETERIZATION N/A 01/29/2023    Procedure: Coronary angiography;  Surgeon: Johnnie Ang MD;  Location: [facility] CATH INVASIVE LOCATION;  Service: Cardiology;  Laterality: N/A;    COLONOSCOPY  05/17/2013    eh, ih, tort, sig tics    COLONOSCOPY N/A 05/10/2019    Non-thrombosed external hemorrhoids found on perianal exam, Diverticulosis, Tortuous colon, One 5 mm polyp in the mid ascending colon, IH. Path: Tubular adenoma.     COLONOSCOPY N/A 09/24/2022    Procedure: COLONOSCOPY to cecum and TI with argon plasma coagulation.;  Surgeon: Bruce Black MD;  Location: [facility] ENDOSCOPY;  Service: Gastroenterology;  Laterality: N/A;  pre- GI bleeding  post- radiation proctitis, diverticulosis    ENDOSCOPY  03/19/2015    z line irreg, hh    ENDOSCOPY N/A 09/24/2022    Procedure: ESOPHAGOGASTRODUODENOSCOPY;  Surgeon: Bruce Black MD;  Location: [facility] ENDOSCOPY;  Service: Gastroenterology;  Laterality: N/A;  pre- GI bleeding  post- esophagitis, hiatal hernia    HIP OPEN REDUCTION Right 01/17/2018    Procedure: HIP OPEN REDUCTION INTERNAL FIXATION WITH DYNAMIC HIP SCREW;  Surgeon: Les Black MD;  Location: [facility] MAIN OR;  Service:     SPINE SURGERY      TONSILLECTOMY      TOTAL HIP ARTHROPLASTY REVISION Right 09/23/2019    Procedure: HIP  REVISION RIGHT;  Surgeon: Isauro Warner  MD MAGDIEL;  Location: [facility] MAIN OR;  Service: Orthopedics      General Information       Row Name 05/22/24 1710          Physical Therapy Time and Intention    Document Type evaluation  -EM     Mode of Treatment individual therapy;physical therapy  -EM       Row Name 05/22/24 1710          General Information    Patient Profile Reviewed yes  -EM     Prior Level of Function independent:  pt uses rollator at baseline, lives in [place]  -EM     Existing Precautions/Restrictions fall  -EM       Row Name 05/22/24 1710          Living Environment    People in Home facility resident  -EM       Row Name 05/22/24 1710          Cognition    Orientation Status (Cognition) oriented x 3  -EM       Row Name 05/22/24 1710          Safety Issues, Functional Mobility    Impairments Affecting Function (Mobility) balance;endurance/activity tolerance;strength;pain  -EM               User Key  (r) = Recorded By, (t) = Taken By, (c) = Cosigned By      Initials Name Provider Type    EM Tenisha Salazar, PT Physical Therapist                   Mobility       Row Name 05/22/24 1711          Bed Mobility    Bed Mobility supine-sit  -EM     Supine-Sit [place] (Bed Mobility) minimum assist (75% patient effort)  -EM     Assistive Device (Bed Mobility) head of bed elevated  -EM       Row Name 05/22/24 1711          Sit-Stand Transfer    Sit-Stand [place] (Transfers) contact guard;verbal cues  -EM     Assistive Device (Sit-Stand Transfers) walker, front-wheeled  -EM       Row Name 05/22/24 1711          Gait/Stairs (Locomotion)    [place] Level (Gait) contact guard  -EM     Assistive Device (Gait) walker, front-wheeled  -EM     Distance in Feet (Gait) 20  -EM     Deviations/Abnormal Patterns (Gait) gait speed decreased;stride length decreased  -EM     Bilateral Gait Deviations forward flexed posture  -EM     Comment, (Gait/Stairs) c/o R knee pain limiting, ambulated to bathroom and then back to chair, leaning on forearms on rwx on way  back to chair, riky knees flexed, unsteady  -EM               User Key  (r) = Recorded By, (t) = Taken By, (c) = Cosigned By      Initials Name Provider Type    EM Tenisha Salazar PT Physical Therapist                   Obj/Interventions       Row Name 05/22/24 1715          Range of Motion Comprehensive    General Range of Motion no range of motion deficits identified  -EM       Row Name 05/22/24 1715          Strength Comprehensive (MMT)    General Manual Muscle Testing (MMT) Assessment other (see comments)  -EM     Comment, General Manual Muscle Testing (MMT) Assessment no focal deficits identified  -EM       Row Name 05/22/24 1715          Balance    Balance Assessment sitting static balance;sitting dynamic balance;standing static balance;standing dynamic balance  -EM     Static Sitting Balance supervision  -EM     Dynamic Sitting Balance supervision  -EM     Position, Sitting Balance sitting edge of bed  -EM     Static Standing Balance contact guard  -EM     Dynamic Standing Balance contact guard;minimal assist  -EM     Position/Device Used, Standing Balance walker, rolling  -EM       Row Name 05/22/24 1715          Sensory Assessment (Somatosensory)    Sensory Assessment (Somatosensory) sensation intact  -EM               User Key  (r) = Recorded By, (t) = Taken By, (c) = Cosigned By      Initials Name Provider Type    EM Tenisha Salazar PT Physical Therapist                   Goals/Plan       Row Name 05/22/24 1720          Bed Mobility Goal 1 (PT)    Activity/Assistive Device (Bed Mobility Goal 1, PT) bed mobility activities, all  -EM     [place] Level/Cues Needed (Bed Mobility Goal 1, PT) supervision required  -EM     Time Frame (Bed Mobility Goal 1, PT) 1 week  -EM       Row Name 05/22/24 1720          Transfer Goal 1 (PT)    Activity/Assistive Device (Transfer Goal 1, PT) transfers, all;walker, rolling  -EM     [place] Level/Cues Needed (Transfer Goal 1, PT) standby assist  -EM      Time Frame (Transfer Goal 1, PT) 1 week  -EM       Row Name 05/22/24 1720          Gait Training Goal 1 (PT)    Activity/Assistive Device (Gait Training Goal 1, PT) gait (walking locomotion);walker, rolling  -EM     [place] Level (Gait Training Goal 1, PT) contact guard required  -EM     Distance (Gait Training Goal 1, PT) 100  -EM     Time Frame (Gait Training Goal 1, PT) 1 week  -EM       Row Name 05/22/24 1720          Therapy Assessment/Plan (PT)    Planned Therapy Interventions (PT) bed mobility training;gait training;home exercise program;patient/family education;transfer training;strengthening  -EM               User Key  (r) = Recorded By, (t) = Taken By, (c) = Cosigned By      Initials Name Provider Type    EM Tenisha Salazar, PT Physical Therapist                   Clinical Impression       Row Name 05/22/24 4686          Pain    Pain Location - Side/Orientation Right  -EM     Pain Location - knee  -EM     Pre/Posttreatment Pain Comment pt c/o pain in R knee, did not rate on numeric scale but reports pain increased with WB  -EM     Pain Intervention(s) Repositioned  -EM       Row Name 05/22/24 3496          Plan of Care Review    Plan of Care Reviewed With patient  -EM     Outcome Evaluation Pt is 85 yo male admitted to [facility] for PE. PMH significant for afib, HTN, CHF, COPD. patient lives in assisted living, normally independent with use of rollator. Patient performed bed mobility with Alejandra, sit to stand with CGA and cues for hand placement, ambulated short distance to bathroom and back to chair with rwx and min/CGA. pt demonstrates impairments consisting of generalized weakness, decreased activity tolerance and would benefit from skilled PT. d/c recommendations are SNU at current level of function, PT will continue to follow.  -EM       Row Name 05/22/24 1716          Therapy Assessment/Plan (PT)    Patient/Family Therapy Goals Statement (PT) get better  -EM     Rehab Potential (PT) good, to  achieve stated therapy goals  -EM     Criteria for Skilled Interventions Met (PT) yes;skilled treatment is necessary  -EM     Therapy Frequency (PT) 5 times/wk  -EM       Row Name 05/22/24 1716          Positioning and Restraints    Pre-Treatment Position in bed  -EM     Post Treatment Position chair  -EM     In Chair reclined;call light within reach;exit alarm on;notified nsg  -EM               User Key  (r) = Recorded By, (t) = Taken By, (c) = Cosigned By      Initials Name Provider Type    Tenisha Kulkarni PT Physical Therapist                   Outcome Measures       Row Name 05/22/24 1720 05/22/24 1016       How much help from another person do you currently need...    Turning from your back to your side while in flat bed without using bedrails? 3  -EM 3  -DS    Moving from lying on back to sitting on the side of a flat bed without bedrails? 3  -EM 3  -DS    Moving to and from a bed to a chair (including a wheelchair)? 3  -EM 2  -DS    Standing up from a chair using your arms (e.g., wheelchair, bedside chair)? 3  -EM 2  -DS    Climbing 3-5 steps with a railing? 2  -EM 2  -DS    To walk in hospital room? 3  -EM 2  -DS    AM-PAC 6 Clicks Score (PT) 17  -EM 14  -DS    Highest Level of Mobility Goal 5 --> Static standing  -EM 4 --> Transfer to chair/commode  -DS              User Key  (r) = Recorded By, (t) = Taken By, (c) = Cosigned By      Initials Name Provider Type    Tenisha Kulkarni PT Physical Therapist    Evelyn Benites, RN Registered Nurse                                 Physical Therapy Education       Title: PT OT SLP Therapies (In Progress)       Topic: Physical Therapy (In Progress)       Point: Mobility training (Done)       Learning Progress Summary             Patient Acceptance, E VU by EM at 5/22/2024 1721                         Point: Home exercise program (Not Started)       Learner Progress:  Not documented in this visit.              Point: Body mechanics (Not Started)        Learner Progress:  Not documented in this visit.              Point: Precautions (Not Started)       Learner Progress:  Not documented in this visit.                              User Key       Initials Effective Dates Name Provider Type Discipline    EM 06/16/21 -  Tenisha Salazar PT Physical Therapist PT                  PT Recommendation and Plan  Planned Therapy Interventions (PT): bed mobility training, gait training, home exercise program, patient/family education, transfer training, strengthening  Plan of Care Reviewed With: patient  Outcome Evaluation: Pt is 85 yo male admitted to [facility] for PE. PMH significant for afib, HTN, CHF, COPD. patient lives in assisted living, normally independent with use of rollator. Patient performed bed mobility with Alejandra, sit to stand with CGA and cues for hand placement, ambulated short distance to bathroom and back to chair with rwx and min/CGA. pt demonstrates impairments consisting of generalized weakness, decreased activity tolerance and would benefit from skilled PT. d/c recommendations are SNU at current level of function, PT will continue to follow.     Time Calculation:         PT Charges       Row Name 05/22/24 1721             Time Calculation    Start Time 1535  -EM      Stop Time 1606  -EM      Time Calculation (min) 31 min  -EM      PT Received On 05/22/24  -EM      PT - Next Appointment 05/23/24  -EM      PT Goal Re-Cert Due Date 05/29/24  -EM         Time Calculation- PT    Total Timed Code Minutes- PT 18 minute(s)  -EM         Timed Charges    78082 - PT Therapeutic Activity Minutes 18  -EM         Total Minutes    Timed Charges Total Minutes 18  -EM       Total Minutes 18  -EM                User Key  (r) = Recorded By, (t) = Taken By, (c) = Cosigned By      Initials Name Provider Type    EM Tenisha Salazar PT Physical Therapist                  Therapy Charges for Today       Code Description Service Date Service Provider Modifiers Qty     93909721364 HC PT THERAPEUTIC ACT EA 15 MIN 5/22/2024 Tenisha Salazar, PT GP 1    63679013246 HC PT EVAL MOD COMPLEXITY 2 5/22/2024 Tenisha Salazar, PT GP 1            PT G-Codes  AM-PAC 6 Clicks Score (PT): 17  PT Discharge Summary  Anticipated Discharge Disposition (PT): skilled nursing facility    Tenisha Salazar, PT  5/22/2024

## 2024-05-22 NOTE — ED PROVIDER NOTES
EMERGENCY DEPARTMENT ENCOUNTER    Room Number:  02/02  PCP: Alfonso Goldstein MD  Historian: Patient      HPI:  Chief Complaint: Right leg swelling  A complete HPI/ROS/PMH/PSH/SH/FH are unobtainable due to: None  Context: Tony Leonard is a 84 y.o. male who presents to the ED c/o right leg swelling.  Patient lives in nursing home.  Patient sent here for swelling right leg.  Patient is had no chest pain pressure tightness.  No difficulty breathing.  Has had no fevers.  Patient has had increasing pain and swelling in right leg.  Has had no vomiting or diarrhea            PAST MEDICAL HISTORY  Active Ambulatory Problems     Diagnosis Date Noted    Thrush, oral 03/11/2016    ADD (attention deficit disorder) 03/11/2016    Hemorrhoids 03/11/2016    Bronchiectasis with acute lower respiratory infection 03/11/2016    Diverticul disease small and large intestine, no perforati or abscess 03/11/2016    Benign non-nodular prostatic hyperplasia without lower urinary tract symptoms 03/11/2016    Gastroesophageal reflux disease 03/11/2016    Malaise and fatigue 03/11/2016    Environmental allergies 04/25/2016    Hemoptysis 04/27/2016    Dyslipidemia 05/11/2016    Primary osteoarthritis involving multiple joints 05/11/2016    Pneumonia of right lower lobe due to infectious organism 10/03/2016    Abnormal EKG 10/04/2016    COPD (chronic obstructive pulmonary disease) 10/04/2016    Mild malnutrition 03/28/2017    Acute on chronic respiratory failure with hypoxia 04/27/2017    Fracture, intertrochanteric, right femur, closed, initial encounter 01/16/2018    Chronic diastolic CHF (congestive heart failure) 01/16/2018    Hematoma of frontal scalp 01/16/2018    Postoperative anemia due to acute blood loss 01/19/2018    Urinary retention 01/25/2018    Hemorrhagic disorder due to circulating anticoagulants 01/29/2018    History of fracture of right hip 02/22/2018    Closed fracture of right hip with routine healing 02/28/2018     Chronic low back pain with sciatica 06/21/2018    Change in bowel function 04/18/2019    Rectal bleeding 04/18/2019    Prostate cancer 04/22/2019    On home oxygen therapy 09/24/2019    Acute viral bronchitis 12/29/2019    Peripheral neuropathy 07/01/2021    Plantar fasciitis 09/08/2021    HTN (hypertension) 05/06/2022    COPD exacerbation 05/07/2022    Bilateral lower extremity edema 05/07/2022    Microscopic hematuria 05/08/2022    Acute midline low back pain with right-sided sciatica 08/01/2022    Spinal stenosis, lumbar region with neurogenic claudication 08/02/2022    Compression deformity of vertebra 08/02/2022    Rectal bleed 09/23/2022    Esophagitis 09/24/2022    Angiectasia 09/27/2022    Hiatal hernia 09/27/2022    Overweight (BMI 25.0-29.9) 11/14/2022    Leukocytosis 11/15/2022    Bilateral hip pain 11/21/2022    Elevated troponin level not due myocardial infarction 12/10/2022    Nocturnal hypoxia 12/10/2022    Pneumonia of right upper lobe due to infectious organism 12/29/2022    NSTEMI (non-ST elevated myocardial infarction) 01/29/2023    Chronic respiratory failure with hypoxia 01/29/2023    Paroxysmal atrial fibrillation 02/17/2023    Acute exacerbation of chronic obstructive pulmonary disease (COPD) 05/10/2023    Anemia 05/10/2023    Non-compliant patient 05/11/2023    Hypokalemia 10/01/2020    Spondylolisthesis of lumbar region 08/14/2018    Elevated troponin 12/22/2023    Rhabdomyolysis 12/26/2023    Multiple contusions 12/26/2023    Fall 12/26/2023    Dizziness 04/09/2024    Contusion of hip 04/09/2024     Resolved Ambulatory Problems     Diagnosis Date Noted    Pneumonia of both lower lobes due to infectious organism 03/11/2016    Pneumonia of right upper lobe due to infectious organism 04/22/2016    COPD exacerbation 04/25/2016    GERD (gastroesophageal reflux disease) 04/25/2016    Chronic respiratory failure with hypoxia 10/04/2016    Bacterial lobar pneumonia 12/27/2016    Pneumonia of left  lower lobe due to infectious organism 03/26/2017    Bronchitis 06/01/2017    COPD exacerbation 06/17/2017    Leukocytosis 01/16/2018    Right upper lobe pneumonia 01/20/2018    Mucus plugging of bronchi 01/16/2018    COPD exacerbation 04/16/2018    Degenerative joint disease (DJD) of hip 09/23/2019    Bronchiectasis with (acute) exacerbation 12/28/2019    Septic shock 11/26/2022    Acute saddle pulmonary embolism without acute cor pulmonale  - 12/11/2022 12/11/2022    Chest pain 04/19/2024     Past Medical History:   Diagnosis Date    ADHD (attention deficit hyperactivity disorder)     Bronchiectasis     CHF (congestive heart failure)     Colon polyp     Diverticulosis     Hard of hearing     Pneumonia          PAST SURGICAL HISTORY  Past Surgical History:   Procedure Laterality Date    BRONCHOSCOPY N/A 04/30/2016    Procedure: BRONCHOSCOPY with BAL of right lower lobe and left  lower lobe.  ;  Surgeon: Nando Diaz MD;  Location: [facility] ENDOSCOPY;  Service:     BRONCHOSCOPY N/A 04/28/2017    Procedure: BRONCHOSCOPY;  Surgeon: Katharina Salter MD;  Location: [facility] ENDOSCOPY;  Service:     BRONCHOSCOPY Bilateral 06/29/2017    Procedure: BRONCHOSCOPY WITH WASHINGS;  Surgeon: Katharina Salter MD;  Location: [facility] ENDOSCOPY;  Service:     BRONCHOSCOPY N/A 01/22/2018    Procedure: BRONCHOSCOPY AT BEDSIDE with BAL;  Surgeon: Katharina Salter MD;  Location: [facility] ENDOSCOPY;  Service:     BRONCHOSCOPY N/A 01/30/2018    Procedure: BRONCHOSCOPY with BAL and washing;  Surgeon: Shreyas Wild MD;  Location: [facility] ENDOSCOPY;  Service:     BRONCHOSCOPY Bilateral 04/24/2018    Procedure: BRONCHOSCOPY WITH WASHING;  Surgeon: Katharina Salter MD;  Location: [facility] ENDOSCOPY;  Service: Pulmonary    BRONCHOSCOPY N/A 12/28/2019    Procedure: BRONCHOSCOPY with bilateral washing;  Surgeon: Katharina Salter MD;  Location: [facility] ENDOSCOPY;  Service: Pulmonary    BRONCHOSCOPY Bilateral 01/04/2023    Procedure: BRONCHOSCOPY with lavage;   Surgeon: Katharina Salter MD;  Location: [facility] ENDOSCOPY;  Service: Pulmonary;  Laterality: Bilateral;  mucus plugging    CARDIAC CATHETERIZATION N/A 01/29/2023    Procedure: Left Heart Cath;  Surgeon: Johnnie Ang MD;  Location: [facility] CATH INVASIVE LOCATION;  Service: Cardiology;  Laterality: N/A;    CARDIAC CATHETERIZATION N/A 01/29/2023    Procedure: Coronary angiography;  Surgeon: Johnnie Ang MD;  Location: [facility] CATH INVASIVE LOCATION;  Service: Cardiology;  Laterality: N/A;    COLONOSCOPY  05/17/2013    eh, ih, tort, sig tics    COLONOSCOPY N/A 05/10/2019    Non-thrombosed external hemorrhoids found on perianal exam, Diverticulosis, Tortuous colon, One 5 mm polyp in the mid ascending colon, IH. Path: Tubular adenoma.     COLONOSCOPY N/A 09/24/2022    Procedure: COLONOSCOPY to cecum and TI with argon plasma coagulation.;  Surgeon: Bruce Black MD;  Location: [facility] ENDOSCOPY;  Service: Gastroenterology;  Laterality: N/A;  pre- GI bleeding  post- radiation proctitis, diverticulosis    ENDOSCOPY  03/19/2015    z line irreg, hh    ENDOSCOPY N/A 09/24/2022    Procedure: ESOPHAGOGASTRODUODENOSCOPY;  Surgeon: Bruce Black MD;  Location: [facility] ENDOSCOPY;  Service: Gastroenterology;  Laterality: N/A;  pre- GI bleeding  post- esophagitis, hiatal hernia    HIP OPEN REDUCTION Right 01/17/2018    Procedure: HIP OPEN REDUCTION INTERNAL FIXATION WITH DYNAMIC HIP SCREW;  Surgeon: Les Black MD;  Location: [facility] MAIN OR;  Service:     SPINE SURGERY      TONSILLECTOMY      TOTAL HIP ARTHROPLASTY REVISION Right 09/23/2019    Procedure: HIP  REVISION RIGHT;  Surgeon: Isauro Warner MD;  Location: [facility] MAIN OR;  Service: Orthopedics         FAMILY HISTORY  Family History   Problem Relation Age of Onset    Thyroid disease Mother     No Known Problems Father     Malig Hyperthermia Neg Hx          SOCIAL HISTORY  Social History     Socioeconomic History    Marital status: Single   Tobacco  Use    Smoking status: Never    Smokeless tobacco: Never   Vaping Use    Vaping status: Never Used   Substance and Sexual Activity    Alcohol use: No     Comment: red wine occassional    Drug use: No    Sexual activity: Defer         ALLERGIES  Daliresp [roflumilast], Latex, and Sulfa antibiotics        REVIEW OF SYSTEMS  Review of Systems   Right leg swelling    PHYSICAL EXAM  ED Triage Vitals   Temp Heart Rate Resp BP SpO2   05/21/24 2027 05/21/24 2026 05/21/24 2026 05/21/24 2026 05/21/24 2026   99.8 °F (37.7 °C) 114 18 148/84 95 %      Temp src Heart Rate Source Patient Position BP Location FiO2 (%)   05/21/24 2027 -- -- -- --   Tympanic           Physical Exam      GENERAL: no acute distress  HENT: nares patent  EYES: no scleral icterus  CV: regular rhythm, normal rate  RESPIRATORY: normal effort  ABDOMEN: soft  MUSCULOSKELETAL: no deformity.  Swelling right leg  NEURO: alert, moves all extremities, follows commands  PSYCH:  calm, cooperative  SKIN: warm, dry.  Some mild redness to right lower extremity    Vital signs and nursing notes reviewed.          LAB RESULTS  Recent Results (from the past 24 hour(s))   Comprehensive Metabolic Panel    Collection Time: 05/21/24  8:40 PM    Specimen: Blood   Result Value Ref Range    Glucose 129 (H) 65 - 99 mg/dL    BUN 26 (H) 8 - 23 mg/dL    Creatinine 1.13 0.76 - 1.27 mg/dL    Sodium 140 136 - 145 mmol/L    Potassium 3.8 3.5 - 5.2 mmol/L    Chloride 106 98 - 107 mmol/L    CO2 22.0 22.0 - 29.0 mmol/L    Calcium 8.9 8.6 - 10.5 mg/dL    Total Protein 6.1 6.0 - 8.5 g/dL    Albumin 3.6 3.5 - 5.2 g/dL    ALT (SGPT) 17 1 - 41 U/L    AST (SGOT) 13 1 - 40 U/L    Alkaline Phosphatase 69 39 - 117 U/L    Total Bilirubin 0.3 0.0 - 1.2 mg/dL    Globulin 2.5 gm/dL    A/G Ratio 1.4 g/dL    BUN/Creatinine Ratio 23.0 7.0 - 25.0    Anion Gap 12.0 5.0 - 15.0 mmol/L    eGFR 63.3 >60.0 mL/min/1.73   D-dimer, Quantitative    Collection Time: 05/21/24  8:40 PM    Specimen: Blood   Result Value  Ref Range    D-Dimer, Quantitative >20.00 (H) 0.00 - 0.86 MCGFEU/mL   Lactic Acid, Plasma    Collection Time: 05/21/24  8:40 PM    Specimen: Blood   Result Value Ref Range    Lactate 2.6 (C) 0.5 - 2.0 mmol/L   CBC Auto Differential    Collection Time: 05/21/24  8:40 PM    Specimen: Blood   Result Value Ref Range    WBC 7.21 3.40 - 10.80 10*3/mm3    RBC 3.79 (L) 4.14 - 5.80 10*6/mm3    Hemoglobin 10.3 (L) 13.0 - 17.7 g/dL    Hematocrit 32.8 (L) 37.5 - 51.0 %    MCV 86.5 79.0 - 97.0 fL    MCH 27.2 26.6 - 33.0 pg    MCHC 31.4 (L) 31.5 - 35.7 g/dL    RDW 16.2 (H) 12.3 - 15.4 %    RDW-SD 50.2 37.0 - 54.0 fl    MPV 8.9 6.0 - 12.0 fL    Platelets 255 140 - 450 10*3/mm3    Neutrophil % 71.5 42.7 - 76.0 %    Lymphocyte % 16.9 (L) 19.6 - 45.3 %    Monocyte % 11.0 5.0 - 12.0 %    Eosinophil % 0.0 (L) 0.3 - 6.2 %    Basophil % 0.0 0.0 - 1.5 %    Immature Grans % 0.6 (H) 0.0 - 0.5 %    Neutrophils, Absolute 5.16 1.70 - 7.00 10*3/mm3    Lymphocytes, Absolute 1.22 0.70 - 3.10 10*3/mm3    Monocytes, Absolute 0.79 0.10 - 0.90 10*3/mm3    Eosinophils, Absolute 0.00 0.00 - 0.40 10*3/mm3    Basophils, Absolute 0.00 0.00 - 0.20 10*3/mm3    Immature Grans, Absolute 0.04 0.00 - 0.05 10*3/mm3    nRBC 0.0 0.0 - 0.2 /100 WBC   aPTT    Collection Time: 05/21/24  8:40 PM    Specimen: Blood   Result Value Ref Range    PTT 26.7 22.7 - 35.4 seconds   Protime-INR    Collection Time: 05/21/24  8:40 PM    Specimen: Blood   Result Value Ref Range    Protime 13.8 11.7 - 14.2 Seconds    INR 1.04 0.90 - 1.10       Ordered the above labs and reviewed the results.        RADIOLOGY  CT Angiogram Chest    Result Date: 5/21/2024  CT ANGIOGRAM OF THE CHEST. MULTIPLE CORONAL, SAGITTAL, AND 3-D RECONSTRUCTIONS.  HISTORY: 84-year-old male with history of pulmonary thromboemboli. Right leg swelling.  TECHNIQUE: Radiation dose reduction techniques were utilized, including automated exposure control and exposure modulation based on body size. CT angiogram of the  chest was performed following the administration of IV contrast. Multiple coronal, sagittal, and 3-D reconstruction images were obtained. Compared with chest CTA 12/10/2022.  FINDINGS: 1. There is heterogeneous admixture of contrast within the pulmonary arteries, but there is a pulmonary thromboembolus within a segmental left upper lobe pulmonary artery, series 4 image 49 and at a segmental right lower lobe pulmonary artery, image 85. No other definite pulmonary thromboemboli, but tiny thromboemboli can't be excluded given the heterogeneous admixture. The RV to LV ratio is less than 1.  2. There is a tiny right pleural effusion which was not present previously and there is faint groundglass density at the medial aspect of the left lung base and there are dependent atelectatic changes at the right lung base. There are also new very small groundglass opacities at the superior segment of the left lower lobe and at the adjacent posterior aspect of the left upper lobe, image 51. The appearance is nonspecific. These may represent sequela of pulmonary thromboemboli, but atypical pneumonia is possible as well. Reevaluation is recommended with a chest CT in 3 months.  3. No left pleural or pericardial effusions. There is no lymphadenopathy within the chest. There is 4.3 cm dilatation of the ascending thoracic aorta, stable. The diameter tapers to 3 cm at the aortic arch.  4. There is a new small-moderate hiatal hernia.  5. Stable T12 and L1 old compression deformities.       Ordered the above noted radiological studies.  CT chest independently interpreted by me and shows no evidence of pneumonia          PROCEDURES  Critical Care    Performed by: Topher Manzo MD  Authorized by: Topher Manzo MD    Critical care provider statement:     Critical care time (minutes):  35    Critical care time was exclusive of:  Separately billable procedures and treating other patients    Critical care was necessary to treat or  prevent imminent or life-threatening deterioration of the following conditions:  Respiratory failure and circulatory failure    Critical care was time spent personally by me on the following activities:  Ordering and performing treatments and interventions, ordering and review of laboratory studies, pulse oximetry, re-evaluation of patient's condition and discussions with primary provider              MEDICATIONS GIVEN IN ER  Medications   sodium chloride 0.9 % flush 10 mL (has no administration in time range)   heparin (porcine) 5000 UNIT/ML injection 7,300 Units (has no administration in time range)   heparin 84872 units/250 mL (100 units/mL) in 0.45 % NaCl infusion (has no administration in time range)   heparin (porcine) 5000 UNIT/ML injection 3,600-7,300 Units (has no administration in time range)   iopamidol (ISOVUE-370) 76 % injection 95 mL (95 mL Intravenous Given 5/21/24 2141)                   MEDICAL DECISION MAKING, PROGRESS, and CONSULTS    All labs have been independently reviewed by me.  All radiology studies have been reviewed by me and I have also reviewed the radiology report.   EKG's independently viewed and interpreted by me.  Discussion below represents my analysis of pertinent findings related to patient's condition, differential diagnosis, treatment plan and final disposition.      Additional sources:  - Discussed/ obtained information from independent historians: None    - External (non-ED) record review: Epic reviewed patient seen by GI 5/2/2024 for reflux    - Chronic or social conditions impacting care: None    - Shared decision making: None      Orders placed during this visit:  Orders Placed This Encounter   Procedures    Blood Culture - Blood,    Blood Culture - Blood,    CT Angiogram Chest    Comprehensive Metabolic Panel    D-dimer, Quantitative    Lactic Acid, Plasma    CBC Auto Differential    STAT Lactic Acid, Reflex    aPTT    aPTT    CBC Auto Differential    CBC Auto Differential     aPTT    Protime-INR    BNP    Single High Sensitivity Troponin T    Adjust Heparin Rate Based on aPTT Using Nomogram    Verify All Anticoagulant Orders Are Discontinued Upon Initiation of Heparin Protocol (eg Enoxaparin, Fondaparinux, Apixaban, Dabigatran, Edoxaban, or Rivaroxaban)    RN To Release aPTT Order 6 Hours After Heparin Bolus & 6 Hours After Any Heparin Rate Change    [facility] (on-call MD unless specified) Details    Insert Peripheral IV    Inpatient Admission    CBC & Differential    CBC & Differential         Additional orders considered but not ordered:  None        Differential diagnosis includes but is not limited to:    DVT versus PE versus cellulitis      Independent interpretation of labs, radiology studies, and discussions with consultants:  ED Course as of 05/21/24 2229   Tue May 21, 2024   2224 22:24 EDT  Patient presents for evaluation of right leg swelling.  Is tachycardic.  Patient appears to have PE on CT scan.  Patient has extensive swelling to right leg and most likely extensive DVT.  Patient has been started on heparin.  Discussed with Dr. Scott who will admit. [SL]      ED Course User Index  [SL] Topher Manzo MD                 DIAGNOSIS  Final diagnoses:   Acute pulmonary embolism without acute cor pulmonale, unspecified pulmonary embolism type   Acute deep vein thrombosis (DVT) of proximal vein of right lower extremity         DISPOSITION  admit            Latest Documented Vital Signs:  As of 22:29 EDT  BP- 129/77 HR- 96 Temp- 99.8 °F (37.7 °C) (Tympanic) O2 sat- 92%              --    Please note that portions of this were completed with a voice recognition program.       Note Disclaimer: At [facility], we believe that sharing information builds trust and better relationships. You are receiving this note because you are receiving care at [facility] or recently visited. It is possible you will see health information before a provider has talked with you about it. This  kind of information can be easy to misunderstand. To help you fully understand what it means for your health, we urge you to discuss this note with your provider.            Topher Manzo MD  05/21/24 9050

## 2024-05-22 NOTE — PROGRESS NOTES
I supervised care provided by the PABLITO Price. We have discussed the case. I personally interviewed the patient and examined the patient. Exam is with a pleasant 83 yo male. Slight resp distress, no wheezing, soft, nt. Significant RLE swelling to thigh.     This is a 83 yo male with history of GERD, COPD, and other medical issues who presents with leg swelling x 1 week and dyspnea with exertion x 2 weeks. Came to [facility], CT PE +. Started on heparin gtt. Could not get doppler overnight.     Will plan on heparin gtt, monitor ptt. Will need oral a/c at dc. Check LE doppler- with his significant swelling and depending on doppler results could consider Vascular consultation after results obtained. He is 83 yo though the degree of swelling is significant.     I performed the substantive portion of the medical decision making.    Nolan Scott MD  [facility]  05/21/24  23:53 EDT

## 2024-05-22 NOTE — PLAN OF CARE
Goal Outcome Evaluation:  Plan of Care Reviewed With: patient           Outcome Evaluation: Pt LAURA for testing, on diet. Hx of chronic dysphagia, will follow up.

## 2024-05-22 NOTE — PROGRESS NOTES
Clinical Pharmacy Services: Medication History    Tony Leonard is a 84 y.o. male presenting to [facility] for Pulmonary embolism [I26.99]  Acute deep vein thrombosis (DVT) of proximal vein of right lower extremity [I82.4Y1]  Acute pulmonary embolism without acute cor pulmonale, unspecified pulmonary embolism type [I26.99]    He  has a past medical history of ADHD (attention deficit hyperactivity disorder), Bronchiectasis, CHF (congestive heart failure), Colon polyp, COPD (chronic obstructive pulmonary disease), Diverticulosis, Hard of hearing, On home oxygen therapy, Pneumonia, Prostate cancer (04/22/2019), and Spinal stenosis, lumbar region with neurogenic claudication (08/02/2022).    Allergies as of 05/21/2024 - Reviewed 05/21/2024   Allergen Reaction Noted    Daliresp [roflumilast] Anaphylaxis 12/14/2018    Latex Rash 03/10/2016    Sulfa antibiotics Rash 03/10/2016       Medication information was obtained from: [facility]   Pharmacy and Phone Number:     Prior to Admission Medications       Prescriptions Last Dose Informant Patient Reported? Taking?    acetaminophen (TYLENOL) 325 MG tablet 5/21/2024 Nursing Home No Yes    Take 2 tablets by mouth Every 4 (Four) Hours As Needed for Mild Pain.    albuterol (PROVENTIL) (2.5 MG/3ML) 0.083% nebulizer solution 5/21/2024 Nursing Home No Yes    Take 2.5 mg by nebulization Every 6 (Six) Hours As Needed for Wheezing.    calcium carbonate (TUMS) 500 MG chewable tablet 5/20/2024 Nursing Home Yes Yes    Chew 2 tablets Every 4 (Four) Hours As Needed for Indigestion or Heartburn.    dicyclomine (BENTYL) 10 MG capsule More than a month Nursing Home No No    Take 1 capsule by mouth 3 (Three) Times a Day As Needed (abdominal bloating).    FLUoxetine (PROzac) 20 MG capsule 5/21/2024 Nursing Home No Yes    TAKE 1 CAPSULE DAILY    gabapentin (NEURONTIN) 300 MG capsule 5/21/2024 Nursing Home No Yes    Take 1 capsule by mouth 3 (Three) Times a Day.    guaiFENesin  (MUCINEX) 600 MG 12 hr tablet  Nursing Home Yes Yes    Take 2 tablets by mouth 2 (Two) Times a Day.    multivitamin with minerals (Multivitamin Adult) tablet tablet Past Month Nursing Home No Yes    Take 1 tablet by mouth Daily.    ondansetron ODT (ZOFRAN-ODT) 4 MG disintegrating tablet More than a month Nursing Home No No    Place 1 tablet on the tongue Every 6 (Six) Hours As Needed for Nausea or Vomiting.    pantoprazole (PROTONIX) 40 MG EC tablet 5/21/2024 Nursing Home No Yes    Take 1 tablet by mouth Daily.    sodium chloride 7 % nebulizer solution nebulizer solution 5/21/2024 Nursing Home No Yes    Take 4 mL by nebulization Every 4 (Four) Hours As Needed (pt takes as needed at home).    sucralfate (Carafate) 1 g tablet 5/21/2024 Nursing Home No Yes    Take 1 tablet by mouth 4 (Four) Times a Day.    Umeclidinium-Vilanterol (ANORO ELLIPTA) 62.5-25 MCG/ACT aerosol powder  inhaler 5/21/2024 Nursing Home No Yes    Inhale 1 puff Daily.              Medication notes:     This medication list is complete to the best of my knowledge as of 5/22/2024    Please call if questions.    Ximena Segura, PharmD, BCPS  5/22/2024 13:10 EDT

## 2024-05-22 NOTE — PLAN OF CARE
Goal Outcome Evaluation:  Plan of Care Reviewed With: patient           Outcome Evaluation: Pt is 85 yo male admitted to [facility] for PE. PMH significant for afib, HTN, CHF, COPD. patient lives in assisted living, normally independent with use of rollator. Patient performed bed mobility with Alejandra, sit to stand with CGA and cues for hand placement, ambulated short distance to bathroom and back to chair with rwx and min/CGA. pt demonstrates impairments consisting of generalized weakness, decreased activity tolerance and would benefit from skilled PT. d/c recommendations are SNU at current level of function, PT will continue to follow.      Anticipated Discharge Disposition (PT): skilled nursing facility

## 2024-05-22 NOTE — PROGRESS NOTES
Discharge Planning Assessment  [facility]     Patient Name: Tony Leonard  MRN: [id number]  Today's Date: 5/22/2024    Admit Date: 5/21/2024    Plan: Return to [facility]   Discharge Needs Assessment       Row Name 05/22/24 1417       Living Environment    People in Home facility resident    Current Living Arrangements extended care facility    Potentially Unsafe Housing Conditions none    Primary Care Provided by other (see comments)    Provides Primary Care For no one, unable/limited ability to care for self    Family Caregiver if Needed child(brian), adult    Family Caregiver Names Boo Finch [phone]    Quality of Family Relationships helpful    Able to Return to Prior Arrangements yes       Resource/Environmental Concerns    Resource/Environmental Concerns none    Transportation Concerns none       Transition Planning    Patient/Family Anticipates Transition to long-term care facility    Patient/Family Anticipated Services at Transition rehabilitation services    Transportation Anticipated health plan transportation       Discharge Needs Assessment    Readmission Within the Last 30 Days no previous admission in last 30 days    Equipment Currently Used at Home oxygen;hospital bed    Concerns to be Addressed denies needs/concerns at this time    Anticipated Changes Related to Illness none    Equipment Needed After Discharge none    Provided Post Acute Provider List? N/A    N/A Provider List Comment Return to [facility]                   Discharge Plan       Row Name 05/22/24 1421       Plan    Plan Return to [facility]    Patient/Family in Agreement with Plan yes    Plan Comments Per Pavithra/[facility], patient is from [facility] with a Medicaid bedhold and can return.  Spoke with boo Finch [phone] by telephone.  He confirms plan to return to [facility] at [place].  Went to speak to patient but he was sleeping and did not arouse. CCP will follow.  Adán TAVARES                   Continued Care and Services - Admitted Since 5/21/2024       Destination Coordination complete.      Service Provider Request Status Selected Services Address Phone Fax Patient Preferred    [facility]  Selected Nursing Home [address] [facility] [address] [phone] --                  Selected Continued Care - Prior Encounters Includes continued care and service providers with selected services from prior encounters from 2/21/2024 to 5/22/2024      Discharged on 4/20/2024 Admission date: 4/18/2024 - Discharge disposition: Home or Self Care      Destination       Service Provider Selected Services Address Phone Fax Patient Preferred    [facility] Nursing Home [address] [facility] [address] [phone] --                      Discharged on 4/9/2024 Admission date: 4/8/2024 - Discharge disposition: Skilled Nursing Facility (DC - External)      Destination       Service Provider Selected Services Address Phone Fax Patient Preferred    [facility] Nursing [address] [address] [phone] --                          Expected Discharge Date and Time       Expected Discharge Date Expected Discharge Time    May 26, 2024            Demographic Summary       Row Name 05/22/24 1415       General Information    Admission Type inpatient    Arrived From subacute/long-term acute care    Referral Source admission list    Reason for Consult discharge planning    Preferred Language English                   Functional Status       Row Name 05/22/24 1417       Functional Status    Usual Activity Tolerance fair    Current Activity Tolerance fair       Functional Status, IADL    Medications completely dependent    Meal Preparation completely dependent    Housekeeping completely dependent    Laundry completely dependent    Shopping completely dependent       Mental Status    General Appearance  WDL X;appearance    General Appearance unshaven       Mental Status Summary    Recent Changes in Mental Status/Cognitive Functioning unable to assess                               Becky S. [place], RN

## 2024-05-22 NOTE — PROGRESS NOTES
Name: Tony Leonard ADMIT: 2024   : 1940  PCP: Alfonso Goldstein MD    MRN: [id number] LOS: 1 days   AGE/SEX: 84 y.o. male  ROOM: [facility]     Subjective   Subjective   He is feeling okay this morning.  Has pain in his right lower extremity.  Does not feel very short of breath while just resting in bed.    Objective   Objective   Vital Signs  Temp:  [97.7 °F (36.5 °C)-99.8 °F (37.7 °C)] 98.6 °F (37 °C)  Heart Rate:  [] 84  Resp:  [18] 18  BP: (123-149)/(62-99) 123/62  SpO2:  [91 %-100 %] 100 %  on   ;   Device (Oxygen Therapy): room air  Body mass index is 28.05 kg/m².  Physical Exam  Constitutional:       Appearance: He is ill-appearing.   Pulmonary:      Effort: Pulmonary effort is normal. No respiratory distress.      Breath sounds: No stridor. No wheezing.   Abdominal:      General: There is no distension.      Tenderness: There is no abdominal tenderness.   Musculoskeletal:      Right lower leg: Edema present.      Comments: Significant swelling of right lower extremity   Skin:     Coloration: Skin is not pale.   Neurological:      Mental Status: He is alert and oriented to person, place, and time.         Results Review     I reviewed the patient's new clinical results.  Results from last 7 days   Lab Units 24  0807 24  2040   WBC 10*3/mm3 7.06 7.21   HEMOGLOBIN g/dL 10.0* 10.3*   PLATELETS 10*3/mm3 245 255     Results from last 7 days   Lab Units 24  0807 24  2040   SODIUM mmol/L 140 140   POTASSIUM mmol/L 3.7 3.8   CHLORIDE mmol/L 107 106   CO2 mmol/L 23.0 22.0   BUN mg/dL 22 26*   CREATININE mg/dL 0.83 1.13   GLUCOSE mg/dL 96 129*   EGFR mL/min/1.73 85.2 63.3     Results from last 7 days   Lab Units 24  2040   ALBUMIN g/dL 3.6   BILIRUBIN mg/dL 0.3   ALK PHOS U/L 69   AST (SGOT) U/L 13   ALT (SGPT) U/L 17     Results from last 7 days   Lab Units 24  0807 24  2040   CALCIUM mg/dL 8.3* 8.9   ALBUMIN g/dL  --  3.6     Results from last 7  "days   Lab Units 05/22/24  0052 05/21/24 2040   LACTATE mmol/L 0.8 2.6*     No results found for: \"HGBA1C\", \"POCGLU\"    No radiology results for the last day  Scheduled Medications  arformoterol, 15 mcg, Nebulization, BID - RT   And  tiotropium bromide monohydrate, 2 puff, Inhalation, Daily - RT  FLUoxetine, 20 mg, Oral, Daily  gabapentin, 300 mg, Oral, TID  pantoprazole, 40 mg, Oral, Daily  sodium chloride, 10 mL, Intravenous, Q12H  sucralfate, 1 g, Oral, 4x Daily    Infusions  heparin, 16.4 Units/kg/hr, Last Rate: 13.4 Units/kg/hr (05/22/24 1142)    Diet  Diet: Cardiac; Healthy Heart (2-3 Na+); Fluid Consistency: Thin (IDDSI 0)       Assessment/Plan     Active Hospital Problems    Diagnosis  POA    **Pulmonary embolism [I26.99]  Yes    Anemia [D64.9]  Yes    Paroxysmal atrial fibrillation [I48.0]  Yes    HTN (hypertension) [I10]  Yes    Prostate cancer [C61]  Yes    Urinary retention [R33.9]  Yes    Chronic diastolic CHF (congestive heart failure) [I50.32]  Yes    COPD (chronic obstructive pulmonary disease) [J44.9]  Yes    Dyslipidemia [E78.5]  Yes    Gastroesophageal reflux disease [K21.9]  Yes      Resolved Hospital Problems   No resolved problems to display.       84 y.o. male admitted with Pulmonary embolism.      05/22/24  Will ask vascular surgery to evaluate given extensive DVT in right lower extremity and significant swelling.  Continue heparin for now.  Plan to transition to DOAC prior to DC.    Pulmonary embolism/right leg swelling  -Patient started on heparin drip, continue per protocol  -RLE doppler with acute DVT in external iliac, common femoral, deep femoral, entire femoral, popliteal, posterior tibial, gastrocnemius  -Vascular consult     Anemia  -Hemoglobin stable, continue to monitor     COPD  -Continue home meds         DVT prophylaxis: Heparin gtt  Discussed with patient and care team on multidisciplinary rounds.  Anticipated discharge home, when cleared by consultants            Roderick WALTON" MD Kellen  [facility]  05/22/24  13:41 EDT

## 2024-05-22 NOTE — H&P
Patient Name:  Tony Leonard  YOB: 1940  MRN:  [id number]  Admit Date:  5/21/2024  Patient Care Team:  Alfonso Goldstein MD as PCP - General (Family Medicine)  Katharina Salter MD as Consulting Physician (Pulmonary Disease)  Anum Bhatt MD as Consulting Physician (Pain Medicine)      Subjective   History Present Illness     Chief Complaint   Patient presents with    Leg Swelling     History of Present Illness  Mr. Leonard is a 84 year old male with history of GERD, COPD, CHF, urinary retention, prostate cancer, paroxysmal A-fib, hypertension, anemia who presents to the emergency room with right leg swelling and shortness of breath.  Patient states he noted some leg swelling a few days ago but got significantly worse today.  He states the last 2 days he has had pain in his leg when he has been walking.  He normally does walk with a walker.  He states today he was getting short of breath when just walking to the bathroom.  He has not had any blood clots in the past, he is not on any anticoagulation.  He denies having any recent fever or illness.  He denies any nausea or vomiting, no trouble with urination.  In the emergency room troponin 55, he denies any chest pain or shortness of breath, sodium was 140, potassium 3.8, , creatinine 1.13, BUN 26, glucose 129.  Lactate is 2.6, D-dimer greater than 20, INR 1.0, white blood cell count 7.2, hemoglobin 10.3, hematocrit 30.8, platelets 255.  Blood culture pending.  CTA of his chest shows pulmonary thromboembolism within the segmental left upper lobe pulmonary artery, segmental right lower lobe pulmonary artery, no definitive pulmonary thromboemboli but tiny thromboemboli cannot be excluded given the heterogenous admixture.  The RV to LV ratio is less than 1.  There is a tiny right pleural effusion which was not present previously and there is faint groundglass density at the medial aspect of the left lung.  Patient was started on  heparin drip in the emergency room.  Patient does have significant right leg swelling and tenderness.    Review of Systems   Constitutional:  Negative for appetite change and fever.   HENT:  Negative for nosebleeds and trouble swallowing.    Eyes:  Negative for photophobia, redness and visual disturbance.   Respiratory:  Positive for shortness of breath (with exertion). Negative for cough, chest tightness and wheezing.    Cardiovascular:  Positive for leg swelling (right). Negative for chest pain and palpitations.   Gastrointestinal:  Negative for abdominal distention, abdominal pain, nausea and vomiting.   Endocrine: Negative.    Genitourinary: Negative.    Musculoskeletal:  Negative for gait problem and joint swelling.   Skin: Negative.    Neurological:  Negative for dizziness, seizures, speech difficulty, light-headedness and headaches.   Hematological: Negative.    Psychiatric/Behavioral:  Negative for behavioral problems and confusion.         Personal History     Past Medical History:   Diagnosis Date    ADHD (attention deficit hyperactivity disorder)     Bronchiectasis     CHF (congestive heart failure)     Colon polyp     COPD (chronic obstructive pulmonary disease)     Diverticulosis     Hard of hearing     On home oxygen therapy     2 LITERS    Pneumonia     Prostate cancer 04/22/2019    Spinal stenosis, lumbar region with neurogenic claudication 08/02/2022     Past Surgical History:   Procedure Laterality Date    BRONCHOSCOPY N/A 04/30/2016    Procedure: BRONCHOSCOPY with BAL of right lower lobe and left  lower lobe.  ;  Surgeon: Nando Diaz MD;  Location: [facility] ENDOSCOPY;  Service:     BRONCHOSCOPY N/A 04/28/2017    Procedure: BRONCHOSCOPY;  Surgeon: Katharina Salter MD;  Location: [facility] ENDOSCOPY;  Service:     BRONCHOSCOPY Bilateral 06/29/2017    Procedure: BRONCHOSCOPY WITH WASHINGS;  Surgeon: Katharina Salter MD;  Location: [facility] ENDOSCOPY;  Service:     BRONCHOSCOPY N/A 01/22/2018     Procedure: BRONCHOSCOPY AT BEDSIDE with BAL;  Surgeon: Katharina Salter MD;  Location: [facility] ENDOSCOPY;  Service:     BRONCHOSCOPY N/A 01/30/2018    Procedure: BRONCHOSCOPY with BAL and washing;  Surgeon: Shreyas Wild MD;  Location: [facility] ENDOSCOPY;  Service:     BRONCHOSCOPY Bilateral 04/24/2018    Procedure: BRONCHOSCOPY WITH WASHING;  Surgeon: Katharina Salter MD;  Location: [facility] ENDOSCOPY;  Service: Pulmonary    BRONCHOSCOPY N/A 12/28/2019    Procedure: BRONCHOSCOPY with bilateral washing;  Surgeon: Katharina Salter MD;  Location: [facility] ENDOSCOPY;  Service: Pulmonary    BRONCHOSCOPY Bilateral 01/04/2023    Procedure: BRONCHOSCOPY with lavage;  Surgeon: Katharina Salter MD;  Location: [facility] ENDOSCOPY;  Service: Pulmonary;  Laterality: Bilateral;  mucus plugging    CARDIAC CATHETERIZATION N/A 01/29/2023    Procedure: Left Heart Cath;  Surgeon: Johnnie Ang MD;  Location: [facility] CATH INVASIVE LOCATION;  Service: Cardiology;  Laterality: N/A;    CARDIAC CATHETERIZATION N/A 01/29/2023    Procedure: Coronary angiography;  Surgeon: Johnnie Ang MD;  Location: [facility] CATH INVASIVE LOCATION;  Service: Cardiology;  Laterality: N/A;    COLONOSCOPY  05/17/2013    eh, ih, tort, sig tics    COLONOSCOPY N/A 05/10/2019    Non-thrombosed external hemorrhoids found on perianal exam, Diverticulosis, Tortuous colon, One 5 mm polyp in the mid ascending colon, IH. Path: Tubular adenoma.     COLONOSCOPY N/A 09/24/2022    Procedure: COLONOSCOPY to cecum and TI with argon plasma coagulation.;  Surgeon: Bruce Black MD;  Location: [facility] ENDOSCOPY;  Service: Gastroenterology;  Laterality: N/A;  pre- GI bleeding  post- radiation proctitis, diverticulosis    ENDOSCOPY  03/19/2015    z line irreg, hh    ENDOSCOPY N/A 09/24/2022    Procedure: ESOPHAGOGASTRODUODENOSCOPY;  Surgeon: Bruce Black MD;  Location: [facility] ENDOSCOPY;  Service: Gastroenterology;  Laterality: N/A;  pre- GI bleeding  post- esophagitis, hiatal  hernia    HIP OPEN REDUCTION Right 01/17/2018    Procedure: HIP OPEN REDUCTION INTERNAL FIXATION WITH DYNAMIC HIP SCREW;  Surgeon: Les Black MD;  Location: [facility] MAIN OR;  Service:     SPINE SURGERY      TONSILLECTOMY      TOTAL HIP ARTHROPLASTY REVISION Right 09/23/2019    Procedure: HIP  REVISION RIGHT;  Surgeon: Isauro Warner MD;  Location: [facility] MAIN OR;  Service: Orthopedics     Family History   Problem Relation Age of Onset    Thyroid disease Mother     No Known Problems Father     Malig Hyperthermia Neg Hx      Social History     Tobacco Use    Smoking status: Never    Smokeless tobacco: Never   Vaping Use    Vaping status: Never Used   Substance Use Topics    Alcohol use: No     Comment: red wine occassional    Drug use: No     No current facility-administered medications on file prior to encounter.     Current Outpatient Medications on File Prior to Encounter   Medication Sig Dispense Refill    acetaminophen (TYLENOL) 325 MG tablet Take 2 tablets by mouth Every 4 (Four) Hours As Needed for Mild Pain.      albuterol (PROVENTIL) (2.5 MG/3ML) 0.083% nebulizer solution Take 2.5 mg by nebulization Every 6 (Six) Hours As Needed for Wheezing. 360 mL 3    calcium carbonate (TUMS) 500 MG chewable tablet Chew 1 tablet As Needed for Indigestion or Heartburn.      FLUoxetine (PROzac) 20 MG capsule TAKE 1 CAPSULE DAILY 90 capsule 3    gabapentin (NEURONTIN) 300 MG capsule Take 1 capsule by mouth 3 (Three) Times a Day. 6 capsule 0    guaiFENesin (MUCINEX) 600 MG 12 hr tablet Take 1 tablet by mouth 2 (Two) Times a Day As Needed for Cough or Congestion.      multivitamin with minerals (Multivitamin Adult) tablet tablet Take 1 tablet by mouth Daily. 30 tablet 11    pantoprazole (PROTONIX) 40 MG EC tablet Take 1 tablet by mouth Daily. 90 tablet 3    sodium chloride 7 % nebulizer solution nebulizer solution Take 4 mL by nebulization Every 4 (Four) Hours As Needed (pt takes as needed at home).      sucralfate  (Carafate) 1 g tablet Take 1 tablet by mouth 4 (Four) Times a Day. 120 tablet 0    tiZANidine (ZANAFLEX) 2 MG tablet Take 1 tablet by mouth At Night As Needed for Muscle Spasms. (Patient taking differently: Take 1 tablet by mouth Daily As Needed for Muscle Spasms.) 30 tablet 1    Umeclidinium-Vilanterol (ANORO ELLIPTA) 62.5-25 MCG/ACT aerosol powder  inhaler Inhale 1 puff Daily. 1 each 11    dicyclomine (BENTYL) 10 MG capsule Take 1 capsule by mouth 3 (Three) Times a Day As Needed (abdominal bloating). 30 capsule 0    ondansetron ODT (ZOFRAN-ODT) 4 MG disintegrating tablet Place 1 tablet on the tongue Every 6 (Six) Hours As Needed for Nausea or Vomiting.      predniSONE (DELTASONE) 20 MG tablet       rOPINIRole (REQUIP) 1 MG tablet       [DISCONTINUED] methylphenidate (RITALIN) 10 MG tablet  (Patient not taking: Reported on 5/2/2024)      [DISCONTINUED] Mirabegron ER (Myrbetriq) 25 MG tablet sustained-release 24 hour 24 hr tablet  (Patient not taking: Reported on 5/2/2024)       Allergies   Allergen Reactions    Daliresp [Roflumilast] Anaphylaxis    Latex Rash    Sulfa Antibiotics Rash       Objective    Objective     Vital Signs  Temp:  [99.8 °F (37.7 °C)] 99.8 °F (37.7 °C)  Heart Rate:  [] 100  Resp:  [18] 18  BP: (125-148)/(77-99) 125/99  SpO2:  [92 %-95 %] 94 %  on   ;      Body mass index is 28.05 kg/m².    Physical Exam  Vitals and nursing note reviewed.   Constitutional:       General: He is not in acute distress.     Appearance: He is well-developed.   HENT:      Head: Normocephalic.   Neck:      Vascular: No JVD.   Cardiovascular:      Rate and Rhythm: Normal rate and regular rhythm.      Heart sounds: Normal heart sounds.      Comments: Normal sinus rhythm on the monitor with heart rate 72 during my exam, he does have right leg swelling and tenderness.  Good pedal pulses.  Pulmonary:      Effort: Pulmonary effort is normal.      Breath sounds: Normal breath sounds.      Comments: Diminished lung  sounds, otherwise clear, O2 sats are 95% on room air during my exam, he is in no acute distress.  Abdominal:      General: There is no distension.      Palpations: Abdomen is soft.      Tenderness: There is no abdominal tenderness.   Musculoskeletal:         General: Normal range of motion.      Cervical back: Normal range of motion.   Skin:     General: Skin is warm and dry.      Capillary Refill: Capillary refill takes less than 2 seconds.   Neurological:      General: No focal deficit present.      Mental Status: He is alert and oriented to person, place, and time.   Psychiatric:         Attention and Perception: Attention normal.         Mood and Affect: Mood normal.         Speech: Speech normal.         Behavior: Behavior normal.         Cognition and Memory: Cognition normal.         Results Review:  I reviewed the patient's new clinical results.  I reviewed the patient's new imaging results and agree with the interpretation.  I reviewed the patient's other test results and agree with the interpretation  I personally viewed and interpreted the patient's EKG/Telemetry data  Discussed with ED provider.    Lab Results (last 24 hours)       Procedure Component Value Units Date/Time    CBC & Differential [[id number]]  (Abnormal) Collected: 05/21/24 2040    Specimen: Blood Updated: 05/21/24 2056    Narrative:      The following orders were created for panel order CBC & Differential.  Procedure                               Abnormality         Status                     ---------                               -----------         ------                     CBC Auto Differential[[id number]]        Abnormal            Final result                 Please view results for these tests on the individual orders.    Comprehensive Metabolic Panel [[id number]]  (Abnormal) Collected: 05/21/24 2040    Specimen: Blood Updated: 05/21/24 2110     Glucose 129 mg/dL      BUN 26 mg/dL      Creatinine 1.13 mg/dL      Sodium 140 mmol/L      " Potassium 3.8 mmol/L      Chloride 106 mmol/L      CO2 22.0 mmol/L      Calcium 8.9 mg/dL      Total Protein 6.1 g/dL      Albumin 3.6 g/dL      ALT (SGPT) 17 U/L      AST (SGOT) 13 U/L      Alkaline Phosphatase 69 U/L      Total Bilirubin 0.3 mg/dL      Globulin 2.5 gm/dL      A/G Ratio 1.4 g/dL      BUN/Creatinine Ratio 23.0     Anion Gap 12.0 mmol/L      eGFR 63.3 mL/min/1.73     Narrative:      GFR Normal >60  Chronic Kidney Disease <60  Kidney Failure <15    The GFR formula is only valid for adults with stable renal function between ages 18 and 70.    D-dimer, Quantitative [[id number]]  (Abnormal) Collected: 05/21/24 2040    Specimen: Blood Updated: 05/21/24 2140     D-Dimer, Quantitative >20.00 MCGFEU/mL     Narrative:      According to the assay 's published package insert, a normal (<0.50 MCGFEU/mL) D-dimer result in conjunction with a non-high clinical probability assessment, excludes deep vein thrombosis (DVT) and pulmonary embolism (PE) with high sensitivity.    D-dimer values increase with age and this can make VTE exclusion of an older population difficult. To address this, the [employer], based on best available evidence and recent guidelines, recommends that clinicians use age-adjusted D-dimer thresholds in patients greater than 50 years of age with: a) a low probability of PE who do not meet all Pulmonary Embolism Rule Out Criteria, or b) in those with intermediate probability of PE.   The formula for an age-adjusted D-dimer cut-off is \"age/100\".  For example, a 60 year old patient would have an age-adjusted cut-off of 0.60 MCGFEU/mL and an 80 year old 0.80 MCGFEU/mL.    Lactic Acid, Plasma [[id number]]  (Abnormal) Collected: 05/21/24 2040    Specimen: Blood Updated: 05/21/24 2113     Lactate 2.6 mmol/L     CBC Auto Differential [[id number]]  (Abnormal) Collected: 05/21/24 2040    Specimen: Blood Updated: 05/21/24 2056     WBC 7.21 10*3/mm3      RBC 3.79 10*6/mm3      " Hemoglobin 10.3 g/dL      Hematocrit 32.8 %      MCV 86.5 fL      MCH 27.2 pg      MCHC 31.4 g/dL      RDW 16.2 %      RDW-SD 50.2 fl      MPV 8.9 fL      Platelets 255 10*3/mm3      Neutrophil % 71.5 %      Lymphocyte % 16.9 %      Monocyte % 11.0 %      Eosinophil % 0.0 %      Basophil % 0.0 %      Immature Grans % 0.6 %      Neutrophils, Absolute 5.16 10*3/mm3      Lymphocytes, Absolute 1.22 10*3/mm3      Monocytes, Absolute 0.79 10*3/mm3      Eosinophils, Absolute 0.00 10*3/mm3      Basophils, Absolute 0.00 10*3/mm3      Immature Grans, Absolute 0.04 10*3/mm3      nRBC 0.0 /100 WBC     aPTT [[id number]]  (Normal) Collected: 05/21/24 2040    Specimen: Blood Updated: 05/21/24 2223     PTT 26.7 seconds     Protime-INR [[id number]]  (Normal) Collected: 05/21/24 2040    Specimen: Blood Updated: 05/21/24 2223     Protime 13.8 Seconds      INR 1.04    BNP [[id number]]  (Normal) Collected: 05/21/24 2040    Specimen: Blood Updated: 05/21/24 2252     proBNP 220.0 pg/mL     Narrative:      This assay is used as an aid in the diagnosis of individuals suspected of having heart failure. It can be used as an aid in the diagnosis of acute decompensated heart failure (ADHF) in patients presenting with signs and symptoms of ADHF to the emergency department (ED). In addition, NT-proBNP of <300 pg/mL indicates ADHF is not likely.    Age Range Result Interpretation  NT-proBNP Concentration (pg/mL:      <50             Positive            >450                   Gray                 300-450                    Negative             <300    50-75           Positive            >900                  Gray                300-900                  Negative            <300      >75             Positive            >1800                  Gray                300-1800                  Negative            <300    Single High Sensitivity Troponin T [[id number]]  (Abnormal) Collected: 05/21/24 2040    Specimen: Blood Updated: 05/21/24 2255     HS Troponin  T 55 ng/L     Narrative:      High Sensitive Troponin T Reference Range:  <14.0 ng/L- Negative Female for AMI  <22.0 ng/L- Negative Male for AMI  >=14 - Abnormal Female indicating possible myocardial injury.  >=22 - Abnormal Male indicating possible myocardial injury.   Clinicians would have to utilize clinical acumen, EKG, Troponin, and serial changes to determine if it is an Acute Myocardial Infarction or myocardial injury due to an underlying chronic condition.         Blood Culture - Blood, Arm, Left [[id number]] Collected: 05/21/24 2053    Specimen: Blood from Arm, Left Updated: 05/21/24 2059    Blood Culture - Blood, Arm, Left [[id number]] Collected: 05/21/24 2053    Specimen: Blood from Arm, Left Updated: 05/21/24 2059            Imaging Results (Last 24 Hours)       Procedure Component Value Units Date/Time    CT Angiogram Chest [[id number]] Collected: 05/21/24 2201     Updated: 05/21/24 2202    Narrative:      CT ANGIOGRAM OF THE CHEST. MULTIPLE CORONAL, SAGITTAL, AND 3-D  RECONSTRUCTIONS.     HISTORY: 84-year-old male with history of pulmonary thromboemboli. Right  leg swelling.     TECHNIQUE: Radiation dose reduction techniques were utilized, including  automated exposure control and exposure modulation based on body size.   CT angiogram of the chest was performed following the administration of  IV contrast. Multiple coronal, sagittal, and 3-D reconstruction images  were obtained. Compared with chest CTA 12/10/2022.     FINDINGS:  1. There is heterogeneous admixture of contrast within the pulmonary  arteries, but there is a pulmonary thromboembolus within a segmental  left upper lobe pulmonary artery, series 4 image 49 and at a segmental  right lower lobe pulmonary artery, image 85. No other definite pulmonary  thromboemboli, but tiny thromboemboli can't be excluded given the  heterogeneous admixture. The RV to LV ratio is less than 1.     2. There is a tiny right pleural effusion which was not  present  previously and there is faint groundglass density at the medial aspect  of the left lung base and there are dependent atelectatic changes at the  right lung base. There are also new very small groundglass opacities at  the superior segment of the left lower lobe and at the adjacent  posterior aspect of the left upper lobe, image 51. The appearance is  nonspecific. These may represent sequela of pulmonary thromboemboli, but  atypical pneumonia is possible as well. Reevaluation is recommended with  a chest CT in 3 months.     3. No left pleural or pericardial effusions. There is no lymphadenopathy  within the chest. There is 4.3 cm dilatation of the ascending thoracic  aorta, stable. The diameter tapers to 3 cm at the aortic arch.     4. There is a new small-moderate hiatal hernia.     5. Stable T12 and L1 old compression deformities.               Results for orders placed during the hospital encounter of 12/21/23    Adult Transthoracic Echo Complete W/ Cont if Necessary Per Protocol    Interpretation Summary    Left ventricular ejection fraction appears to be 56 - 60%.    Left ventricular diastolic function was normal.    Moderate dilation of the aortic root is present. Mild dilation of the sinuses of Valsalva is present.      Telemetry Scan   Final Result           Assessment/Plan     Active Hospital Problems    Diagnosis  POA    **Pulmonary embolism [I26.99]  Yes    Anemia [D64.9]  Yes    Paroxysmal atrial fibrillation [I48.0]  Yes    HTN (hypertension) [I10]  Yes    Prostate cancer [C61]  Yes    Urinary retention [R33.9]  Yes    Chronic diastolic CHF (congestive heart failure) [I50.32]  Yes    COPD (chronic obstructive pulmonary disease) [J44.9]  Yes    Dyslipidemia [E78.5]  Yes    Gastroesophageal reflux disease [K21.9]  Yes     Mr. Leonard is a 84 year old male with history of GERD, COPD, CHF, urinary retention, prostate cancer, paroxysmal A-fib, hypertension, anemia who presents to the emergency room  with right leg swelling and shortness of breath.     Pulmonary embolism/right leg swelling  -Patient started on heparin drip, continue per protocol  -Check lower extremity Doppler in a.m.  -Telemetry unit for monitoring  -CBC, CMP in a.m.    Anemia  -Hemoglobin stable, continue to monitor    Hypertension/CHF  -Chronic conditions stable  -Continue home medications when med rec completed  -Telemetry unit for monitoring    COPD  -Continue home medications  -O2 to keep sats above 90%    I discussed the patient's findings and my recommendations with patient.    VTE Prophylaxis -  heparin drip .  Code Status - Limited code (no CPR, no intubation).       PABLITO Anguiano  [facility]  05/21/24  23:46 EDT

## 2024-05-22 NOTE — THERAPY DISCHARGE NOTE
Acute Care - Speech Language Pathology   Swallow Initial Evaluation/Discharge [facility]     Patient Name: Tony Leonard  : 1940  MRN: [id number]  Today's Date: 2024               Admit Date: 2024    Visit Dx:    ICD-10-CM ICD-9-CM   1. Acute pulmonary embolism without acute cor pulmonale, unspecified pulmonary embolism type  I26.99 415.19   2. Acute deep vein thrombosis (DVT) of proximal vein of right lower extremity  I82.4Y1 453.41     Patient Active Problem List   Diagnosis    Thrush, oral    ADD (attention deficit disorder)    Hemorrhoids    Bronchiectasis with acute lower respiratory infection    Diverticul disease small and large intestine, no perforati or abscess    Benign non-nodular prostatic hyperplasia without lower urinary tract symptoms    Gastroesophageal reflux disease    Malaise and fatigue    Environmental allergies    Hemoptysis    Dyslipidemia    Primary osteoarthritis involving multiple joints    Pneumonia of right lower lobe due to infectious organism    Abnormal EKG    COPD (chronic obstructive pulmonary disease)    Mild malnutrition    Acute on chronic respiratory failure with hypoxia    Fracture, intertrochanteric, right femur, closed, initial encounter    Chronic diastolic CHF (congestive heart failure)    Hematoma of frontal scalp    Postoperative anemia due to acute blood loss    Urinary retention    Hemorrhagic disorder due to circulating anticoagulants    History of fracture of right hip    Closed fracture of right hip with routine healing    Chronic low back pain with sciatica    Change in bowel function    Rectal bleeding    Prostate cancer    On home oxygen therapy    Acute viral bronchitis    Peripheral neuropathy    Plantar fasciitis    HTN (hypertension)    COPD exacerbation    Bilateral lower extremity edema    Microscopic hematuria    Acute midline low back pain with right-sided sciatica    Spinal stenosis, lumbar region with neurogenic claudication     Compression deformity of vertebra    Rectal bleed    Esophagitis    Angiectasia    Hiatal hernia    Overweight (BMI 25.0-29.9)    Leukocytosis    Bilateral hip pain    Elevated troponin level not due myocardial infarction    Nocturnal hypoxia    Pneumonia of right upper lobe due to infectious organism    NSTEMI (non-ST elevated myocardial infarction)    Chronic respiratory failure with hypoxia    Paroxysmal atrial fibrillation    Acute exacerbation of chronic obstructive pulmonary disease (COPD)    Anemia    Non-compliant patient    Hypokalemia    Spondylolisthesis of lumbar region    Elevated troponin    Rhabdomyolysis    Multiple contusions    Fall    Dizziness    Contusion of hip    Pulmonary embolism     Past Medical History:   Diagnosis Date    ADHD (attention deficit hyperactivity disorder)     Bronchiectasis     CHF (congestive heart failure)     Colon polyp     COPD (chronic obstructive pulmonary disease)     Diverticulosis     Hard of hearing     On home oxygen therapy     2 LITERS    Pneumonia     Prostate cancer 04/22/2019    Spinal stenosis, lumbar region with neurogenic claudication 08/02/2022     Past Surgical History:   Procedure Laterality Date    BRONCHOSCOPY N/A 04/30/2016    Procedure: BRONCHOSCOPY with BAL of right lower lobe and left  lower lobe.  ;  Surgeon: Nando Diaz MD;  Location: [facility] ENDOSCOPY;  Service:     BRONCHOSCOPY N/A 04/28/2017    Procedure: BRONCHOSCOPY;  Surgeon: Katharina Salter MD;  Location: [facility] ENDOSCOPY;  Service:     BRONCHOSCOPY Bilateral 06/29/2017    Procedure: BRONCHOSCOPY WITH WASHINGS;  Surgeon: Katharina Salter MD;  Location: [facility] ENDOSCOPY;  Service:     BRONCHOSCOPY N/A 01/22/2018    Procedure: BRONCHOSCOPY AT BEDSIDE with BAL;  Surgeon: Katharina Salter MD;  Location: [facility] ENDOSCOPY;  Service:     BRONCHOSCOPY N/A 01/30/2018    Procedure: BRONCHOSCOPY with BAL and washing;  Surgeon: Shreyas Wild MD;  Location: [facility] ENDOSCOPY;  Service:      BRONCHOSCOPY Bilateral 04/24/2018    Procedure: BRONCHOSCOPY WITH WASHING;  Surgeon: Katharina Salter MD;  Location: [facility] ENDOSCOPY;  Service: Pulmonary    BRONCHOSCOPY N/A 12/28/2019    Procedure: BRONCHOSCOPY with bilateral washing;  Surgeon: Katharina Saletr MD;  Location: [facility] ENDOSCOPY;  Service: Pulmonary    BRONCHOSCOPY Bilateral 01/04/2023    Procedure: BRONCHOSCOPY with lavage;  Surgeon: Katharina Salter MD;  Location: [facility] ENDOSCOPY;  Service: Pulmonary;  Laterality: Bilateral;  mucus plugging    CARDIAC CATHETERIZATION N/A 01/29/2023    Procedure: Left Heart Cath;  Surgeon: Johnnie Ang MD;  Location: [facility] CATH INVASIVE LOCATION;  Service: Cardiology;  Laterality: N/A;    CARDIAC CATHETERIZATION N/A 01/29/2023    Procedure: Coronary angiography;  Surgeon: Johnnie Ang MD;  Location: [facility] CATH INVASIVE LOCATION;  Service: Cardiology;  Laterality: N/A;    COLONOSCOPY  05/17/2013    eh, ih, tort, sig tics    COLONOSCOPY N/A 05/10/2019    Non-thrombosed external hemorrhoids found on perianal exam, Diverticulosis, Tortuous colon, One 5 mm polyp in the mid ascending colon, IH. Path: Tubular adenoma.     COLONOSCOPY N/A 09/24/2022    Procedure: COLONOSCOPY to cecum and TI with argon plasma coagulation.;  Surgeon: Bruce Black MD;  Location: [facility] ENDOSCOPY;  Service: Gastroenterology;  Laterality: N/A;  pre- GI bleeding  post- radiation proctitis, diverticulosis    ENDOSCOPY  03/19/2015    z line irreg, hh    ENDOSCOPY N/A 09/24/2022    Procedure: ESOPHAGOGASTRODUODENOSCOPY;  Surgeon: Bruce Black MD;  Location: [facility] ENDOSCOPY;  Service: Gastroenterology;  Laterality: N/A;  pre- GI bleeding  post- esophagitis, hiatal hernia    HIP OPEN REDUCTION Right 01/17/2018    Procedure: HIP OPEN REDUCTION INTERNAL FIXATION WITH DYNAMIC HIP SCREW;  Surgeon: Les Black MD;  Location: [facility] MAIN OR;  Service:     SPINE SURGERY      TONSILLECTOMY      TOTAL HIP ARTHROPLASTY REVISION  Right 09/23/2019    Procedure: HIP  REVISION RIGHT;  Surgeon: Isauro Warner MD;  Location: [facility];  Service: Orthopedics       SLP Recommendation and Plan  SLP Swallowing Diagnosis: R/O pharyngeal dysphagia (05/22/24 1400)  SLP Diet Recommendation: regular textures, thin liquids (05/22/24 1400)     Monitor for Signs of Aspiration: yes, notify SLP if any concerns (05/22/24 1400)              Therapy Frequency (Swallow): evaluation only (05/22/24 1400)                                           Plan of Care Reviewed With: patient (05/22/24 1416)  Outcome Evaluation: Patient seen for clinical swallow assessment. Denies current dysphagia symptoms. Pt unable to recall recs from previous VFSS, but could demonstrate effortful swallow exercise. Oral mech exam was remarkable for some missing dentition. No overt s/s of aspiration with ice, single drinks thins via spoon/cup/straw, puree, mixed mech soft, or regular solids. VFSS 11/29/22 with recs for mech soft/honey, but that was s/p intubation. VFSS 8/4/22 with recs for regular and thins. Single drinks with liquid wash after regular solids. SLP recs continuing regular and thins, single drinks with liquid wash after regular solids. Meds whole in puree. SLP to sign off, please order VFSS if concerned for silent aspiration. (05/22/24 1416)    SWALLOW EVALUATION (Last 72 Hours)       SLP Adult Swallow Evaluation       Row Name 05/22/24 1400                   Rehab Evaluation    Document Type evaluation  -SH        Patient Effort adequate  -SH           General Information    Patient Profile Reviewed yes  -SH        Pertinent History Of Current Problem PE, leg swelling  -        Current Method of Nutrition regular textures;thin liquids  -        Precautions/Limitations, Vision WFL  -        Precautions/Limitations, Hearing hearing impairment, bilaterally  -        Prior Level of Function-Communication unknown  -        Prior Level of Function-Swallowing other  (see comments)  hx of modified diet and swallow precautions  -        Plans/Goals Discussed with patient;agreed upon  -        Barriers to Rehab medically complex  -           Oral Motor Structure and Function    Dentition Assessment natural, present and adequate;missing teeth  -           Oral Musculature and Cranial Nerve Assessment    Oral Motor General Assessment generalized oral motor weakness  -           Clinical Swallow Eval    Clinical Swallow Evaluation Summary Patient seen for clinical swallow assessment. Denies current dysphagia symptoms. Pt unable to recall recs from previous VFSS, but could demonstrate effortful swallow exercise. Oral mech exam was remarkable for some missing dentition. No overt s/s of aspiration with ice, single drinks thins via spoon/cup/straw, puree, mixed mech soft, or regular solids. VFSS 11/29/22 with recs for mech soft/honey, but that was s/p intubation. VFSS 8/4/22 with recs for regular and thins. Single drinks with liquid wash after regular solids. SLP recs continuing regular and thins, single drinks with liquid wash after regular solids. Meds whole in puree. SLP to sign off, please order VFSS if concerned for silent aspiration.  -           SLP Evaluation Clinical Impression    SLP Swallowing Diagnosis R/O pharyngeal dysphagia  -           Recommendations    Therapy Frequency (Swallow) evaluation only  -        SLP Diet Recommendation regular textures;thin liquids  -        Recommended Precautions and Strategies upright posture during/after eating;small bites of food and sips of liquid;alternate between small bites of food and sips of liquid  -        Oral Care Recommendations Oral Care BID/PRN  -        SLP Rec. for Method of Medication Administration meds whole;with puree;as tolerated  -        Monitor for Signs of Aspiration yes;notify SLP if any concerns  -                  User Key  (r) = Recorded By, (t) = Taken By, (c) = Cosigned By       Initials Name Effective Dates    Madelyn Davies SLP 01/05/24 -                     EDUCATION  The patient has been educated in the following areas:   Dysphagia (Swallowing Impairment).                   Time Calculation:    Time Calculation- SLP       Row Name 05/22/24 1422 05/22/24 0912          Time Calculation- SLP    SLP Start Time 1315  -SH --     SLP Received On 05/22/24  - 05/22/24  -               User Key  (r) = Recorded By, (t) = Taken By, (c) = Cosigned By      Initials Name Provider Type    Madelyn Davies SLP Speech and Language Pathologist                    Therapy Charges for Today       Code Description Service Date Service Provider Modifiers Qty    [id number] HC ST EVAL ORAL PHARYNG SWALLOW 4 5/22/2024 Madelyn Welch SLP GN 1                      BILL Ryan  5/22/2024

## 2024-05-22 NOTE — PLAN OF CARE
Goal Outcome Evaluation:  Plan of Care Reviewed With: patient           Outcome Evaluation: Patient seen for clinical swallow assessment. Denies current dysphagia symptoms. Pt unable to recall recs from previous VFSS, but could demonstrate effortful swallow exercise. Oral mech exam was remarkable for some missing dentition. No overt s/s of aspiration with ice, single drinks thins via spoon/cup/straw, puree, mixed mech soft, or regular solids. VFSS 11/29/22 with recs for mech soft/honey, but that was s/p intubation. VFSS 8/4/22 with recs for regular and thins. Single drinks with liquid wash after regular solids. SLP recs continuing regular and thins, single drinks with liquid wash after regular solids. Meds whole in puree. SLP to sign off, please order VFSS if concerned for silent aspiration.

## 2024-05-22 NOTE — CONSULTS
Name: Tony Leonard ADMIT: 2024   : 1940  PCP: Alfonso Goldstein MD    MRN: [id number] LOS: 1 days   AGE/SEX: 84 y.o. male  ROOM: [place] [address]     Inpatient Vascular Surgery Consult  Consult performed by: Gab Sandoval MD  Consult ordered by: Roderick Foreman MD        CC: Iliofemoral DVT  Subjective     History of Present Illness  84 y.o. male whom we were asked to see regarding an extensive iliofemoral DVT of the right leg.  Patient has a significant history of degenerative disc disease and problems with ambulation.  He is able to walk for few minutes a day using a rollator but his mobility is definitely reduced and he is now in a nursing home.  3 days ago, he developed right-sided leg swelling and presented to the emergency department for evaluation.  He was found to have a right-sided DVT and a pulmonary embolism and we were asked to see him.      Review of Systems  positive right leg swelling and tenderness, patient denies a prior history of DVT or PE but actually I have documentation that he had 1 in 2022.    History Review Reviewed Comments   Past Medical History:  [x]  CHF, COPD, on home oxygen, spinal stenosis with neurogenic claudication   Past Surgical History: [x]  No prior vascular surgery, prior right hip   Family History: []  No hypercoagulable states   Social History: [x]  Non-smoker     Scheduled Meds:     arformoterol, 15 mcg, Nebulization, BID - RT   And  tiotropium bromide monohydrate, 2 puff, Inhalation, Daily - RT  FLUoxetine, 20 mg, Oral, Daily  gabapentin, 300 mg, Oral, TID  pantoprazole, 40 mg, Oral, Daily  sodium chloride, 10 mL, Intravenous, Q12H  sucralfate, 1 g, Oral, 4x Daily      IV Meds:   heparin, 16.4 Units/kg/hr, Last Rate: 13.4 Units/kg/hr (24 1142)        Allergies: Daliresp [roflumilast], Latex, and Sulfa antibiotics    Objective   Temp:  [97.7 °F (36.5 °C)-99.8 °F (37.7 °C)] 98.6 °F (37 °C)  Heart Rate:  []  84  Resp:  [18] 18  BP: (123-149)/(62-99) 123/62    I/O this shift:  In: 236 [P.O.:236]  Out: -     Physical Exam  Vitals reviewed.   Constitutional:       Appearance: He is well-developed.   Eyes:      Conjunctiva/sclera: Conjunctivae normal.   Cardiovascular:      Rate and Rhythm: Normal rate.      Comments: No ulcers, no obvious varicosities, no hemosiderin staining.  No clinical evidence of phlegmasia feet are warm and well-perfused.  Pulmonary:      Effort: Pulmonary effort is normal.   Abdominal:      Palpations: Abdomen is soft.      Tenderness: There is no abdominal tenderness.   Musculoskeletal:      Right lower leg: Edema present.   Neurological:      Mental Status: He is alert and oriented to person, place, and time.       Data Reviewed:  CBC    Results from last 7 days   Lab Units 05/22/24  0807 05/21/24  2040   WBC 10*3/mm3 7.06 7.21   HEMOGLOBIN g/dL 10.0* 10.3*   PLATELETS 10*3/mm3 245 255     BMP   Results from last 7 days   Lab Units 05/22/24  0807 05/21/24  2040   SODIUM mmol/L 140 140   POTASSIUM mmol/L 3.7 3.8   CHLORIDE mmol/L 107 106   CO2 mmol/L 23.0 22.0   BUN mg/dL 22 26*   CREATININE mg/dL 0.83 1.13   GLUCOSE mg/dL 96 129*     Infection     Radiology(recent) No radiology results for the last day    Labs significant for: Hemoglobin 10.0.  BUN 26.  Glucose 129.  Creatinine 1.13    Imaging Studies:  Duplex Venous Lower Extremity - Right CAR (05/22/2024 09:38)   Right-sided external iliac and femoral popliteal and calf DVT    Small pulmonary embolism noted.  Images reviewed.  Previous saddle embolism images also reviewed    Active Hospital Problems    Diagnosis  POA    **Pulmonary embolism [I26.99]  Yes    Anemia [D64.9]  Yes    Paroxysmal atrial fibrillation [I48.0]  Yes    HTN (hypertension) [I10]  Yes    Prostate cancer [C61]  Yes    Urinary retention [R33.9]  Yes    Chronic diastolic CHF (congestive heart failure) [I50.32]  Yes    COPD (chronic obstructive pulmonary disease) [J44.9]  Yes     Dyslipidemia [E78.5]  Yes    Gastroesophageal reflux disease [K21.9]  Yes      Resolved Hospital Problems   No resolved problems to display.       Data Points:  During this visit the following were done:  Labs Reviewed [x]  []  []  Labs Ordered []  []  []  Radiology Reports Reviewed [x]    Radiology Ordered []    EKG, echo, and/or stress test reviewed []    Vascular lab results reviewed  [x]    Vascular lab images reviewed and interpreted per myself   [x]    Referring Provider Records Reviewed []    ER Records Reviewed []    Hospital Records Reviewed/Summarized [x]    History Obtained From Family []    Radiological images view and Interpreted per myself [x]    Case Discussed with referring provider []     Decision to obtain and request outside records  []        Active Hospital Problems:   Active Hospital Problems    Diagnosis  POA    **Pulmonary embolism [I26.99]  Yes    Anemia [D64.9]  Yes    Paroxysmal atrial fibrillation [I48.0]  Yes    HTN (hypertension) [I10]  Yes    Prostate cancer [C61]  Yes    Urinary retention [R33.9]  Yes    Chronic diastolic CHF (congestive heart failure) [I50.32]  Yes    COPD (chronic obstructive pulmonary disease) [J44.9]  Yes    Dyslipidemia [E78.5]  Yes    Gastroesophageal reflux disease [K21.9]  Yes      Resolved Hospital Problems   No resolved problems to display.      Assessment & Plan   [place]illin  Assessment / Plan     84 y.o. male nursing home resident who has a longstanding history of spinal stenosis and decreased mobility.  He also has a history of gastroesophageal reflux disease COPD CHF urinary retention and prostate cancer.  Also history of paroxysmal atrial fibrillation.  Currently not on anticoagulation.  The patient is able to ambulate with the use of a rolling walker for several minutes a day.  About 3 days ago he began to have worsening right lower extremity swelling and discomfort and presented to the hospital for evaluation.  When taking his history, he tells me  that he has never had a DVT or PE before but clearly he had 1 in December 2022 that was actually a saddle embolus.  He was anticoagulated at the time but not clear to me exactly when this was stopped.  He is on chronic oxygen at times at home but is not dyspneic on evaluation today.    On exam, right leg swelling noted.  No evidence of clinical phlegmasia.      Often for iliofemoral DVT or phlegmasia cerulea dolens with symptoms for <14 days and good functional status, we consider  an evaluation for catheter-directed thrombolytic therapy, and/or clot removal (eg, catheter extraction, catheter fragmentation, surgical thrombectomy) rather than anticoagulation alone. At [facility] we favor catheter directed lytics or mechanical thrombectomy when possible over systemic lytics (rarely if ever indicated) to reduce dose and bleeding risk. Pharmacomechanical thrombectomy strategies via a minimally invasive endovascular techniques can be extremely effective.  However, a recent prospective randomized (ATTRACT2) trial did not find that this reduce the risk of post thrombotic syndrome when compared to anticoagulation alone.  It may reduce the severity of symptoms which can be a significant endpoint from the patient's standpoint.    In this situation the patient is oxygen dependent and a nursing home resident.  He is ambulatory but minimally so.  He does have significant leg swelling but no evidence of phlegmasia.  Would be at high risk for intervention given age and comorbidities with a declining return benefit.  It is unlikely that I think he would benefit from clot extraction strategy enough to justify the risk.  For now, agree with anticoagulation, elevation, and compression.  We talked about the risk of pulmonary embolism including death which are mitigated significantly by the use of anticoagulation.  He did not volunteer the fact of his previous saddle PE but well-documented in late 2022.  Likely would need  lifelong anticoagulation.  His pulmonologist is Dr. Salter.    My initial impression is to manage him with anticoagulation only given his age and comorbidities but can see him in the office in a week or 2 to see how he is progressing.      I discussed the patients findings and my recommendations with patient.  Please call my office with any question: [phone]

## 2024-05-22 NOTE — ED NOTES
Nursing report ED to floor  Tony Leonard  84 y.o.  male    HPI :  HPI (Adult)  Stated Reason for Visit: right leg swelling  History Obtained From: EMS    Chief Complaint  Chief Complaint   Patient presents with    Leg Swelling   Tony Leonard is a 84 y.o. male who presents to the ED c/o right leg swelling.  Patient lives in nursing home.  Patient sent here for swelling right leg.  Patient is had no chest pain pressure tightness.  No difficulty breathing.  Has had no fevers.  Patient has had increasing pain and swelling in right leg.  Has had no vomiting or diarrhea     Admitting doctor:   Nolan Scott MD    Admitting diagnosis:   The primary encounter diagnosis was Acute pulmonary embolism without acute cor pulmonale, unspecified pulmonary embolism type. A diagnosis of Acute deep vein thrombosis (DVT) of proximal vein of right lower extremity was also pertinent to this visit.    Code status:   Current Code Status       Date Active Code Status Order ID Comments User Context       Prior            Allergies:   Daliresp [roflumilast], Latex, and Sulfa antibiotics    Isolation:   No active isolations    Intake and Output  No intake or output data in the 24 hours ending 05/21/24 2235    Weight:       05/21/24 2028   Weight: 91.2 kg (201 lb)       Most recent vitals:   Vitals:    05/21/24 2159 05/21/24 2200 05/21/24 2231 05/21/24 2233   BP:  129/77 125/99    Pulse:  96 100    Resp:       Temp:       TempSrc:       SpO2: 92%   92%   Weight:       Height:           Active LDAs/IV Access:   Lines, Drains & Airways       Active LDAs       Name Placement date Placement time Site Days    Peripheral IV 05/21/24 2038 Left Antecubital 05/21/24 2038  Antecubital  less than 1    Peripheral IV 05/21/24 2222 Anterior;Right Forearm 05/21/24 2222  Forearm  less than 1                    Labs (abnormal labs have a star):   Labs Reviewed   COMPREHENSIVE METABOLIC PANEL - Abnormal; Notable for the following components:      "  Result Value    Glucose 129 (*)     BUN 26 (*)     All other components within normal limits    Narrative:     GFR Normal >60  Chronic Kidney Disease <60  Kidney Failure <15    The GFR formula is only valid for adults with stable renal function between ages 18 and 70.   D-DIMER, QUANTITATIVE - Abnormal; Notable for the following components:    D-Dimer, Quantitative >20.00 (*)     All other components within normal limits    Narrative:     According to the assay 's published package insert, a normal (<0.50 MCGFEU/mL) D-dimer result in conjunction with a non-high clinical probability assessment, excludes deep vein thrombosis (DVT) and pulmonary embolism (PE) with high sensitivity.    D-dimer values increase with age and this can make VTE exclusion of an older population difficult. To address this, the [employer], based on best available evidence and recent guidelines, recommends that clinicians use age-adjusted D-dimer thresholds in patients greater than 50 years of age with: a) a low probability of PE who do not meet all Pulmonary Embolism Rule Out Criteria, or b) in those with intermediate probability of PE.   The formula for an age-adjusted D-dimer cut-off is \"age/100\".  For example, a 60 year old patient would have an age-adjusted cut-off of 0.60 MCGFEU/mL and an 80 year old 0.80 MCGFEU/mL.   LACTIC ACID, PLASMA - Abnormal; Notable for the following components:    Lactate 2.6 (*)     All other components within normal limits   CBC WITH AUTO DIFFERENTIAL - Abnormal; Notable for the following components:    RBC 3.79 (*)     Hemoglobin 10.3 (*)     Hematocrit 32.8 (*)     MCHC 31.4 (*)     RDW 16.2 (*)     Lymphocyte % 16.9 (*)     Eosinophil % 0.0 (*)     Immature Grans % 0.6 (*)     All other components within normal limits   APTT - Normal   PROTIME-INR - Normal   BLOOD CULTURE   BLOOD CULTURE   LACTIC ACID, REFLEX   APTT   APTT   CBC WITH AUTO DIFFERENTIAL   CBC WITH AUTO " DIFFERENTIAL   BNP (IN-HOUSE)   SINGLE HS TROPONIN T   CBC AND DIFFERENTIAL    Narrative:     The following orders were created for panel order CBC & Differential.  Procedure                               Abnormality         Status                     ---------                               -----------         ------                     CBC Auto Differential[[id number]]        Abnormal            Final result                 Please view results for these tests on the individual orders.   CBC AND DIFFERENTIAL    Narrative:     The following orders were created for panel order CBC & Differential.  Procedure                               Abnormality         Status                     ---------                               -----------         ------                     CBC Auto Differential[[id number]]                                                         Please view results for these tests on the individual orders.   CBC AND DIFFERENTIAL    Narrative:     The following orders were created for panel order CBC & Differential.  Procedure                               Abnormality         Status                     ---------                               -----------         ------                     CBC Auto Differential[[id number]]                                                         Please view results for these tests on the individual orders.       EKG:   No orders to display       Meds given in ED:   Medications   sodium chloride 0.9 % flush 10 mL (has no administration in time range)   heparin 13387 units/250 mL (100 units/mL) in 0.45 % NaCl infusion (16.4 Units/kg/hr × 91.2 kg Intravenous New Bag 5/21/24 2233)   heparin (porcine) 5000 UNIT/ML injection 3,600-7,300 Units (has no administration in time range)   iopamidol (ISOVUE-370) 76 % injection 95 mL (95 mL Intravenous Given 5/21/24 2141)   heparin (porcine) 5000 UNIT/ML injection 7,300 Units (7,300 Units Intravenous Given 5/21/24 2231)       Imaging  results:  No radiology results for the last day    Ambulatory status:   - bedrest    Social issues:   Social History     Socioeconomic History    Marital status: Single   Tobacco Use    Smoking status: Never    Smokeless tobacco: Never   Vaping Use    Vaping status: Never Used   Substance and Sexual Activity    Alcohol use: No     Comment: red wine occassional    Drug use: No    Sexual activity: Defer       Peripheral Neurovascular  Peripheral Neurovascular (Adult)  Peripheral Neurovascular WDL: .WDL except (right calf rednes/swelling)    Neuro Cognitive  Neuro Cognitive (Adult)  Cognitive/Neuro/Behavioral WDL: WDL    Learning  Learning Assessment (Adult)  Learning Readiness and Ability: no barriers identified    Respiratory  Respiratory WDL  Respiratory WDL: WDL    Abdominal Pain       Pain Assessments  Pain (Adult)  (0-10) Pain Rating: Rest: 2  (0-10) Pain Rating: Activity: 10    NIH Stroke Scale       Radha Hardin RN  05/21/24 22:35 EDT

## 2024-05-23 VITALS
HEIGHT: 71 IN | DIASTOLIC BLOOD PRESSURE: 75 MMHG | HEART RATE: 80 BPM | SYSTOLIC BLOOD PRESSURE: 133 MMHG | WEIGHT: 201 LBS | BODY MASS INDEX: 28.14 KG/M2 | OXYGEN SATURATION: 93 % | TEMPERATURE: 98.4 F | RESPIRATION RATE: 18 BRPM

## 2024-05-23 LAB
APTT PPP: 107.1 SECONDS (ref 22.7–35.4)
BASOPHILS # BLD AUTO: 0 10*3/MM3 (ref 0–0.2)
BASOPHILS NFR BLD AUTO: 0 % (ref 0–1.5)
DEPRECATED RDW RBC AUTO: 48.1 FL (ref 37–54)
EOSINOPHIL # BLD AUTO: 0 10*3/MM3 (ref 0–0.4)
EOSINOPHIL NFR BLD AUTO: 0 % (ref 0.3–6.2)
ERYTHROCYTE [DISTWIDTH] IN BLOOD BY AUTOMATED COUNT: 15.8 % (ref 12.3–15.4)
HCT VFR BLD AUTO: 29 % (ref 37.5–51)
HGB BLD-MCNC: 9.3 G/DL (ref 13–17.7)
IMM GRANULOCYTES # BLD AUTO: 0.04 10*3/MM3 (ref 0–0.05)
IMM GRANULOCYTES NFR BLD AUTO: 0.7 % (ref 0–0.5)
LYMPHOCYTES # BLD AUTO: 1.58 10*3/MM3 (ref 0.7–3.1)
LYMPHOCYTES NFR BLD AUTO: 27 % (ref 19.6–45.3)
MCH RBC QN AUTO: 26.9 PG (ref 26.6–33)
MCHC RBC AUTO-ENTMCNC: 32.1 G/DL (ref 31.5–35.7)
MCV RBC AUTO: 83.8 FL (ref 79–97)
MONOCYTES # BLD AUTO: 0.48 10*3/MM3 (ref 0.1–0.9)
MONOCYTES NFR BLD AUTO: 8.2 % (ref 5–12)
NEUTROPHILS NFR BLD AUTO: 3.76 10*3/MM3 (ref 1.7–7)
NEUTROPHILS NFR BLD AUTO: 64.1 % (ref 42.7–76)
NRBC BLD AUTO-RTO: 0 /100 WBC (ref 0–0.2)
PLATELET # BLD AUTO: 250 10*3/MM3 (ref 140–450)
PMV BLD AUTO: 8.6 FL (ref 6–12)
RBC # BLD AUTO: 3.46 10*6/MM3 (ref 4.14–5.8)
WBC NRBC COR # BLD AUTO: 5.86 10*3/MM3 (ref 3.4–10.8)

## 2024-05-23 PROCEDURE — 94799 UNLISTED PULMONARY SVC/PX: CPT

## 2024-05-23 PROCEDURE — 94761 N-INVAS EAR/PLS OXIMETRY MLT: CPT

## 2024-05-23 PROCEDURE — 85730 THROMBOPLASTIN TIME PARTIAL: CPT | Performed by: EMERGENCY MEDICINE

## 2024-05-23 PROCEDURE — 97530 THERAPEUTIC ACTIVITIES: CPT

## 2024-05-23 PROCEDURE — 36415 COLL VENOUS BLD VENIPUNCTURE: CPT | Performed by: EMERGENCY MEDICINE

## 2024-05-23 PROCEDURE — 85025 COMPLETE CBC W/AUTO DIFF WBC: CPT | Performed by: EMERGENCY MEDICINE

## 2024-05-23 PROCEDURE — 94760 N-INVAS EAR/PLS OXIMETRY 1: CPT

## 2024-05-23 PROCEDURE — 94664 DEMO&/EVAL PT USE INHALER: CPT

## 2024-05-23 RX ORDER — TIZANIDINE 2 MG/1
2 TABLET ORAL DAILY PRN
Start: 2024-05-23

## 2024-05-23 RX ADMIN — PANTOPRAZOLE SODIUM 40 MG: 40 TABLET, DELAYED RELEASE ORAL at 08:41

## 2024-05-23 RX ADMIN — Medication 10 ML: at 08:43

## 2024-05-23 RX ADMIN — ARFORMOTEROL TARTRATE 15 MCG: 15 SOLUTION RESPIRATORY (INHALATION) at 07:11

## 2024-05-23 RX ADMIN — FLUOXETINE HYDROCHLORIDE 20 MG: 20 CAPSULE ORAL at 08:41

## 2024-05-23 RX ADMIN — SUCRALFATE 1 G: 1 TABLET ORAL at 08:41

## 2024-05-23 RX ADMIN — TIOTROPIUM BROMIDE INHALATION SPRAY 2 PUFF: 3.12 SPRAY, METERED RESPIRATORY (INHALATION) at 07:14

## 2024-05-23 RX ADMIN — GABAPENTIN 300 MG: 300 CAPSULE ORAL at 08:41

## 2024-05-23 RX ADMIN — APIXABAN 10 MG: 5 TABLET, FILM COATED ORAL at 08:42

## 2024-05-23 NOTE — THERAPY TREATMENT NOTE
Patient Name: Tony Leonard  : 1940    MRN: [id number]                              Today's Date: 2024       Admit Date: 2024    Visit Dx:     ICD-10-CM ICD-9-CM   1. Acute pulmonary embolism without acute cor pulmonale, unspecified pulmonary embolism type  I26.99 415.19   2. Acute deep vein thrombosis (DVT) of proximal vein of right lower extremity  I82.4Y1 453.41     Patient Active Problem List   Diagnosis    Thrush, oral    ADD (attention deficit disorder)    Hemorrhoids    Bronchiectasis with acute lower respiratory infection    Diverticul disease small and large intestine, no perforati or abscess    Benign non-nodular prostatic hyperplasia without lower urinary tract symptoms    Gastroesophageal reflux disease    Malaise and fatigue    Environmental allergies    Hemoptysis    Dyslipidemia    Primary osteoarthritis involving multiple joints    Pneumonia of right lower lobe due to infectious organism    Abnormal EKG    COPD (chronic obstructive pulmonary disease)    Mild malnutrition    Acute on chronic respiratory failure with hypoxia    Fracture, intertrochanteric, right femur, closed, initial encounter    Chronic diastolic CHF (congestive heart failure)    Hematoma of frontal scalp    Postoperative anemia due to acute blood loss    Urinary retention    Hemorrhagic disorder due to circulating anticoagulants    History of fracture of right hip    Closed fracture of right hip with routine healing    Chronic low back pain with sciatica    Change in bowel function    Rectal bleeding    Prostate cancer    On home oxygen therapy    Acute viral bronchitis    Peripheral neuropathy    Plantar fasciitis    HTN (hypertension)    COPD exacerbation    Bilateral lower extremity edema    Microscopic hematuria    Acute midline low back pain with right-sided sciatica    Spinal stenosis, lumbar region with neurogenic claudication    Compression deformity of vertebra    Rectal bleed    Esophagitis     Angiectasia    Hiatal hernia    Overweight (BMI 25.0-29.9)    Leukocytosis    Bilateral hip pain    Elevated troponin level not due myocardial infarction    Nocturnal hypoxia    Pneumonia of right upper lobe due to infectious organism    NSTEMI (non-ST elevated myocardial infarction)    Chronic respiratory failure with hypoxia    Paroxysmal atrial fibrillation    Acute exacerbation of chronic obstructive pulmonary disease (COPD)    Anemia    Non-compliant patient    Hypokalemia    Spondylolisthesis of lumbar region    Elevated troponin    Rhabdomyolysis    Multiple contusions    Fall    Dizziness    Contusion of hip    Pulmonary embolism     Past Medical History:   Diagnosis Date    ADHD (attention deficit hyperactivity disorder)     Bronchiectasis     CHF (congestive heart failure)     Colon polyp     COPD (chronic obstructive pulmonary disease)     Diverticulosis     Hard of hearing     On home oxygen therapy     2 LITERS    Pneumonia     Prostate cancer 04/22/2019    Spinal stenosis, lumbar region with neurogenic claudication 08/02/2022     Past Surgical History:   Procedure Laterality Date    BRONCHOSCOPY N/A 04/30/2016    Procedure: BRONCHOSCOPY with BAL of right lower lobe and left  lower lobe.  ;  Surgeon: Nando Diaz MD;  Location: [facility] ENDOSCOPY;  Service:     BRONCHOSCOPY N/A 04/28/2017    Procedure: BRONCHOSCOPY;  Surgeon: Katharina Salter MD;  Location: [facility] ENDOSCOPY;  Service:     BRONCHOSCOPY Bilateral 06/29/2017    Procedure: BRONCHOSCOPY WITH WASHINGS;  Surgeon: Katharina Salter MD;  Location: [facility] ENDOSCOPY;  Service:     BRONCHOSCOPY N/A 01/22/2018    Procedure: BRONCHOSCOPY AT BEDSIDE with BAL;  Surgeon: Katharina Salter MD;  Location: [facility] ENDOSCOPY;  Service:     BRONCHOSCOPY N/A 01/30/2018    Procedure: BRONCHOSCOPY with BAL and washing;  Surgeon: Shreyas Wild MD;  Location: [facility] ENDOSCOPY;  Service:     BRONCHOSCOPY Bilateral 04/24/2018    Procedure: BRONCHOSCOPY WITH WASHING;   Surgeon: Katharina Salter MD;  Location: [facility] ENDOSCOPY;  Service: Pulmonary    BRONCHOSCOPY N/A 12/28/2019    Procedure: BRONCHOSCOPY with bilateral washing;  Surgeon: Katharina Salter MD;  [facility];  Service: Pulmonary    BRONCHOSCOPY Bilateral 01/04/2023    Procedure: BRONCHOSCOPY with lavage;  Surgeon: Katharina Salter MD;  Location: [facility] ENDOSCOPY;  Service: Pulmonary;  Laterality: Bilateral;  mucus plugging    CARDIAC CATHETERIZATION N/A 01/29/2023    Procedure: Left Heart Cath;  Surgeon: Johnnie Ang MD;  Location: [facility] CATH INVASIVE LOCATION;  Service: Cardiology;  Laterality: N/A;    CARDIAC CATHETERIZATION N/A 01/29/2023    Procedure: Coronary angiography;  Surgeon: Johnnie Ang MD;  Location: [facility] CATH INVASIVE LOCATION;  Service: Cardiology;  Laterality: N/A;    COLONOSCOPY  05/17/2013    eh, ih, tort, sig tics    COLONOSCOPY N/A 05/10/2019    Non-thrombosed external hemorrhoids found on perianal exam, Diverticulosis, Tortuous colon, One 5 mm polyp in the mid ascending colon, IH. Path: Tubular adenoma.     COLONOSCOPY N/A 09/24/2022    Procedure: COLONOSCOPY to cecum and TI with argon plasma coagulation.;  Surgeon: Bruce Black MD;  Location: [facility] ENDOSCOPY;  Service: Gastroenterology;  Laterality: N/A;  pre- GI bleeding  post- radiation proctitis, diverticulosis    ENDOSCOPY  03/19/2015    z line irreg, hh    ENDOSCOPY N/A 09/24/2022    Procedure: ESOPHAGOGASTRODUODENOSCOPY;  Surgeon: Bruce Black MD;  Location: [facility] ENDOSCOPY;  Service: Gastroenterology;  Laterality: N/A;  pre- GI bleeding  post- esophagitis, hiatal hernia    HIP OPEN REDUCTION Right 01/17/2018    Procedure: HIP OPEN REDUCTION INTERNAL FIXATION WITH DYNAMIC HIP SCREW;  Surgeon: Les Black MD;  Location: [facility] MAIN OR;  Service:     SPINE SURGERY      TONSILLECTOMY      TOTAL HIP ARTHROPLASTY REVISION Right 09/23/2019    Procedure: HIP  REVISION RIGHT;  Surgeon: Isauro Warner  MD MAGDIEL;  Location: [facility] OR;  Service: Orthopedics      General Information       Row Name 05/23/24 1021          Physical Therapy Time and Intention    Document Type therapy note (daily note)  -LW     Mode of Treatment individual therapy;physical therapy  -       Row Name 05/23/24 1021          General Information    Patient Profile Reviewed yes  -LW     Existing Precautions/Restrictions fall  -LW       Row Name 05/23/24 1021          Cognition    Orientation Status (Cognition) oriented x 3  -LW       Row Name 05/23/24 1021          Safety Issues, Functional Mobility    Impairments Affecting Function (Mobility) balance;endurance/activity tolerance;strength;pain  -LW               User Key  (r) = Recorded By, (t) = Taken By, (c) = Cosigned By      Initials Name Provider Type    Iesha Tabor PT Physical Therapist                   Mobility       Row Name 05/23/24 1021          Bed Mobility    Comment, (Bed Mobility) on BSC on entry  -LW       Row Name 05/23/24 1021          Sit-Stand Transfer    Sit-Stand Geary (Transfers) minimum assist (75% patient effort)  -LW     Assistive Device (Sit-Stand Transfers) walker, front-wheeled  -LW       Row Name 05/23/24 1021          Gait/Stairs (Locomotion)    Geary Level (Gait) contact guard  -LW     Assistive Device (Gait) walker, front-wheeled  -LW     Distance in Feet (Gait) 60  -LW     Deviations/Abnormal Patterns (Gait) gait speed decreased;stride length decreased  -LW     Bilateral Gait Deviations forward flexed posture  -LW     Comment, (Gait/Stairs) limited by fatigue and R LE pain, extremely forward flexed posture  -LW               User Key  (r) = Recorded By, (t) = Taken By, (c) = Cosigned By      Initials Name Provider Type    Iesha Tabor PT Physical Therapist                   Obj/Interventions       Row Name 05/23/24 1022          Balance    Balance Assessment sitting static balance;sitting dynamic balance;standing static  "balance;standing dynamic balance  -LW     Static Sitting Balance independent  -LW     Dynamic Sitting Balance independent  -LW     Position, Sitting Balance other (see comments)  BSC/toilet  -LW     Static Standing Balance contact guard  -LW     Dynamic Standing Balance contact guard  -LW     Position/Device Used, Standing Balance walker, front-wheeled  -LW     Balance Interventions sitting;standing;sit to stand;supported;static;dynamic  -LW               User Key  (r) = Recorded By, (t) = Taken By, (c) = Cosigned By      Initials Name Provider Type    Iesha Tabor, PT Physical Therapist                   Goals/Plan    No documentation.                  Clinical Impression       Row Name 05/23/24 1022          Pain    Pre/Posttreatment Pain Comment c/o of \"pain everywhere\", did not rate  -LW     Pain Intervention(s) Rest;Repositioned;Ambulation/increased activity  -LW       Row Name 05/23/24 1022          Plan of Care Review    Plan of Care Reviewed With patient  -LW     Progress improving  -LW     Outcome Evaluation Pt was agreeable to workin with PT. He was on BSC on arrival and stood with Alejandra and rwx. He ambualted 60' with CGA and rwx. Session ended with pt in bathroom and nsg notified. Pt will benefit from continued skilled PT addressing limitations in functional mobility to maximize safety and independence.  -LW       Row Name 05/23/24 1022          Positioning and Restraints    Pre-Treatment Position bedside commode  -LW     Post Treatment Position bathroom  -LW     Bathroom notified nsg;call light within reach;encouraged to call for assist  -LW               User Key  (r) = Recorded By, (t) = Taken By, (c) = Cosigned By      Initials Name Provider Type    Iesha Tabor, PT Physical Therapist                   Outcome Measures       Row Name 05/23/24 1025          How much help from another person do you currently need...    Turning from your back to your side while in flat bed without using bedrails? 3  " -LW     Moving from lying on back to sitting on the side of a flat bed without bedrails? 3  -LW     Moving to and from a bed to a chair (including a wheelchair)? 3  -LW     Standing up from a chair using your arms (e.g., wheelchair, bedside chair)? 3  -LW     Climbing 3-5 steps with a railing? 2  -LW     To walk in hospital room? 3  -LW     AM-PAC 6 Clicks Score (PT) 17  -LW     Highest Level of Mobility Goal 5 --> Static standing  -LW               User Key  (r) = Recorded By, (t) = Taken By, (c) = Cosigned By      Initials Name Provider Type    LW Iesha Wilburn, PT Physical Therapist                                 Physical Therapy Education       Title: PT OT SLP Therapies (In Progress)       Topic: Physical Therapy (In Progress)       Point: Mobility training (Done)       Learning Progress Summary             Patient Acceptance, E, VU,NR by  at 5/23/2024 1025    Acceptance, E, VU by  at 5/22/2024 1721                         Point: Home exercise program (Not Started)       Learner Progress:  Not documented in this visit.              Point: Body mechanics (Done)       Learning Progress Summary             Patient Acceptance, E, VU,NR by  at 5/23/2024 1025                         Point: Precautions (Done)       Learning Progress Summary             Patient Acceptance, E, VU,NR by  at 5/23/2024 1025                                         User Key       Initials Effective Dates Name Provider Type Discipline    EM 06/16/21 -  Tenisha Salazar PT Physical Therapist PT     05/08/23 -  Iesha Wilburn, STEFF Physical Therapist PT                  PT Recommendation and Plan     Plan of Care Reviewed With: patient  Progress: improving  Outcome Evaluation: Pt was agreeable to workin with PT. He was on BSC on arrival and stood with Alejandra and rwx. He ambualted 60' with CGA and rwx. Session ended with pt in bathroom and nsg notified. Pt will benefit from continued skilled PT addressing limitations in functional  mobility to maximize safety and independence.     Time Calculation:         PT Charges       Row Name 05/23/24 1025             Time Calculation    Start Time 0907  -LW      Stop Time 0916  -LW      Time Calculation (min) 9 min  -LW      PT Received On 05/23/24  -LW      PT - Next Appointment 05/24/24  -LW         Time Calculation- PT    Total Timed Code Minutes- PT 9 minute(s)  -LW         Timed Charges    71922 - PT Therapeutic Activity Minutes 9  -LW         Total Minutes    Timed Charges Total Minutes 9  -LW       Total Minutes 9  -LW                User Key  (r) = Recorded By, (t) = Taken By, (c) = Cosigned By      Initials Name Provider Type    LW Iesha Wilburn, PT Physical Therapist                  Therapy Charges for Today       Code Description Service Date Service Provider Modifiers Qty    [id number] HC PT THERAPEUTIC ACT EA 15 MIN 5/23/2024 Iesha Wilburn, PT GP 1            PT G-Codes  AM-PAC 6 Clicks Score (PT): 17  PT Discharge Summary  Anticipated Discharge Disposition (PT): skilled nursing facility    Iesha Wilburn PT  5/23/2024

## 2024-05-23 NOTE — CASE MANAGEMENT/SOCIAL WORK
Continued Stay Note  [facility]     Patient Name: Tony Leonard  MRN: [id number]  Today's Date: 5/23/2024    Admit Date: 5/21/2024    Plan: Return to [facility] via [facility] wheelchair van at 11am today   Discharge Plan       Row Name 05/23/24 0920       Plan    Plan Return to [facility] via [facility] wheelchair van at 11am today    Plan Comments D/c order noted. [facility] in their wheelchair van at 11am today. MD & RN notified. CCP left VMM for patient's son Edward to notify of transport time. CCP spoke with [facility] Manjit who states they will place compression stockings once patient gets to their facility. RUTHIE ALEJANDRA                   Discharge Codes    No documentation.                 Expected Discharge Date and Time       Expected Discharge Date Expected Discharge Time    May 23, 2024               RUTHIE Gutierrez

## 2024-05-23 NOTE — DISCHARGE SUMMARY
"    Patient Name: Tony Leonard  : 1940  MRN: [id number]    Date of Admission: 2024  Date of Discharge:  2024  Primary Care Physician: Alfonso Goldstein MD      Chief Complaint:   Leg Swelling      Discharge Diagnoses     Active Hospital Problems    Diagnosis  POA    **Pulmonary embolism [I26.99]  Yes    Anemia [D64.9]  Yes    Paroxysmal atrial fibrillation [I48.0]  Yes    HTN (hypertension) [I10]  Yes    Prostate cancer [C61]  Yes    Urinary retention [R33.9]  Yes    Chronic diastolic CHF (congestive heart failure) [I50.32]  Yes    COPD (chronic obstructive pulmonary disease) [J44.9]  Yes    Dyslipidemia [E78.5]  Yes    Gastroesophageal reflux disease [K21.9]  Yes      Resolved Hospital Problems   No resolved problems to display.        Admitting HPI     \"Mr. Leonard is a 84 year old male with history of GERD, COPD, CHF, urinary retention, prostate cancer, paroxysmal A-fib, hypertension, anemia who presents to the emergency room with right leg swelling and shortness of breath.  Patient states he noted some leg swelling a few days ago but got significantly worse today.  He states the last 2 days he has had pain in his leg when he has been walking.  He normally does walk with a walker.  He states today he was getting short of breath when just walking to the bathroom.  He has not had any blood clots in the past, he is not on any anticoagulation.  He denies having any recent fever or illness.  He denies any nausea or vomiting, no trouble with urination.  In the emergency room troponin 55, he denies any chest pain or shortness of breath, sodium was 140, potassium 3.8, , creatinine 1.13, BUN 26, glucose 129.  Lactate is 2.6, D-dimer greater than 20, INR 1.0, white blood cell count 7.2, hemoglobin 10.3, hematocrit 30.8, platelets 255.  Blood culture pending.  CTA of his chest shows pulmonary thromboembolism within the segmental left upper lobe pulmonary artery, segmental right lower lobe " "pulmonary artery, no definitive pulmonary thromboemboli but tiny thromboemboli cannot be excluded given the heterogenous admixture.  The RV to LV ratio is less than 1.  There is a tiny right pleural effusion which was not present previously and there is faint groundglass density at the medial aspect of the left lung.  Patient was started on heparin drip in the emergency room.  Patient does have significant right leg swelling and tenderness.\"    Hospital Course     Pt admitted for acute right leg swelling.  Imaging on admission showing small PE.  Subsequent Doppler with extensive right lower extremity DVT.  He was seen in consultation with vascular surgery given the extensiveness of the DVT.  No evidence of clinical phlegmasia and they did not recommend any acute intervention at this time given his age as well as questionable benefit versus risk analysis.  He was started on a heparin drip which has been transitioned to apixaban.  He is breathing comfortably on room air.  Will follow-up with vascular surgery in the next couple weeks for reassessment.  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  Clinically improved and stable for return to facility.    Discharge Plan     Pulmonary embolism/RLE DVT  -RLE doppler with acute DVT in external iliac, common femoral, deep femoral, entire femoral, popliteal, posterior tibial, gastrocnemius  -CTA chest w/ left upper lobe segmental PE, right lower lobe segmental PE; RV to LV ratio less than 1  -Vascular eval'd-no acute intervention recommended; lifelong anticoagulation; follow-up in 1 to 2 weeks for reassessment of lower extremity    Day of Discharge     Physical Exam:  Temp:  [97.7 °F (36.5 °C)-98.6 °F (37 °C)] 98.4 °F (36.9 °C)  Heart Rate:  [] 80  Resp:  [18] 18  BP: (117-133)/(62-75) 133/75  Body mass index is 28.05 kg/m².  Physical Exam  Constitutional:       Appearance: He is ill-appearing.   Pulmonary:      " Effort: Pulmonary effort is normal. No respiratory distress.      Breath sounds: No stridor. No wheezing.   Abdominal:      General: There is no distension.      Tenderness: There is no abdominal tenderness.   Musculoskeletal:      Right lower leg: Edema present.      Comments: Significant swelling of right lower extremity   Skin:     Coloration: Skin is not pale.   Neurological:      Mental Status: He is alert and oriented to person, place, and time    Consultants     Consult Orders (all) (From admission, onward)       Start     Ordered    05/22/24 1055  Inpatient Vascular Surgery Consult  Once        Specialty:  Vascular Surgery  Provider:  Juan Jarquin MD    05/22/24 1054    05/21/24 2349  Inpatient Nutrition Consult  Once        Provider:  (Not yet assigned)    05/21/24 2348    05/21/24 2205  [facility] (on-call MD unless specified) Details  Once        Specialty:  Hospitalist  Provider:  (Not yet assigned)    05/21/24 2204                  Procedures     * Surgery not found *      Imaging Results (All)       Procedure Component Value Units Date/Time    CT Angiogram Chest [[id number]] Collected: 05/21/24 2201     Updated: 05/22/24 1713    Narrative:      CT ANGIOGRAM OF THE CHEST. MULTIPLE CORONAL, SAGITTAL, AND 3-D  RECONSTRUCTIONS.     HISTORY: 84-year-old male with history of pulmonary thromboemboli. Right  leg swelling.     TECHNIQUE: Radiation dose reduction techniques were utilized, including  automated exposure control and exposure modulation based on body size.   CT angiogram of the chest was performed following the administration of  IV contrast. Multiple coronal, sagittal, and 3-D reconstruction images  were obtained. Compared with chest CTA 12/10/2022.     FINDINGS:  1. There is heterogeneous admixture of contrast within the pulmonary  arteries, but there is a pulmonary thromboembolus within a segmental  left upper lobe pulmonary artery, series 4 image 49 and at a segmental  right lower lobe pulmonary  artery, image 85. No other definite pulmonary  thromboemboli, but tiny thromboemboli can't be excluded given the  heterogeneous admixture. The RV to LV ratio is less than 1.     2. There is a tiny right pleural effusion which was not present  previously and there is faint groundglass density at the medial aspect  of the left lung base and there are dependent atelectatic changes at the  right lung base. There are also new very small groundglass opacities at  the superior segment of the left lower lobe and at the adjacent  posterior aspect of the left upper lobe, image 51. The appearance is  nonspecific. These may represent sequela of pulmonary thromboemboli, but  atypical pneumonia is possible as well. Reevaluation is recommended with  a chest CT in 3 months.     3. No left pleural or pericardial effusions. There is no lymphadenopathy  within the chest. There is 4.3 cm dilatation of the ascending thoracic  aorta, stable. The diameter tapers to 3 cm at the aortic arch.     4. There is a new small-moderate hiatal hernia.     5. Stable T12 and L1 old compression deformities.     This report was finalized on 5/22/2024 5:10 PM by Dr. Rosita Huang M.D on  Workstation: [id number]             Results for orders placed during the hospital encounter of 05/21/24    Duplex Venous Lower Extremity - Right CAR    Interpretation Summary    Extensive acute right lower extremity deep vein thrombosis involving in the external iliac, common femoral, deep femoral, entire femoral, popliteal, posterior tibial and gastrocnemius veins.    Acute superficial vein thrombosis at the right saphenofemoral junction and in the great saphenous vein in the thigh.    Results for orders placed during the hospital encounter of 12/21/23    Adult Transthoracic Echo Complete W/ Cont if Necessary Per Protocol    Interpretation Summary    Left ventricular ejection fraction appears to be 56 - 60%.    Left ventricular diastolic function was normal.     "Moderate dilation of the aortic root is present. Mild dilation of the sinuses of Valsalva is present.    Pertinent Labs     Results from last 7 days   Lab Units 05/23/24  0658 05/22/24  0807 05/21/24  2040   WBC 10*3/mm3 5.86 7.06 7.21   HEMOGLOBIN g/dL 9.3* 10.0* 10.3*   PLATELETS 10*3/mm3 250 245 255     Results from last 7 days   Lab Units 05/22/24  0807 05/21/24  2040   SODIUM mmol/L 140 140   POTASSIUM mmol/L 3.7 3.8   CHLORIDE mmol/L 107 106   CO2 mmol/L 23.0 22.0   BUN mg/dL 22 26*   CREATININE mg/dL 0.83 1.13   GLUCOSE mg/dL 96 129*   EGFR mL/min/1.73 85.2 63.3     Results from last 7 days   Lab Units 05/21/24  2040   ALBUMIN g/dL 3.6   BILIRUBIN mg/dL 0.3   ALK PHOS U/L 69   AST (SGOT) U/L 13   ALT (SGPT) U/L 17     Results from last 7 days   Lab Units 05/22/24  0807 05/21/24  2040   CALCIUM mg/dL 8.3* 8.9   ALBUMIN g/dL  --  3.6       Results from last 7 days   Lab Units 05/22/24  0052 05/21/24 2040   HSTROP T ng/L 47* 55*   PROBNP pg/mL  --  220.0   D DIMER QUANT MCGFEU/mL  --  >20.00*           Invalid input(s): \"LDLCALC\"  Results from last 7 days   Lab Units 05/21/24 2053   BLOODCX  No growth at 24 hours  No growth at 24 hours         Test Results Pending at Discharge     Pending Labs       Order Current Status    Blood Culture - Blood, Arm, Left Preliminary result    Blood Culture - Blood, Arm, Left Preliminary result            Discharge Details        Discharge Medications        New Medications        Instructions Start Date   apixaban 5 MG tablet tablet  Commonly known as: ELIQUIS   Take 2 tablets by mouth Every 12 (Twelve) Hours for 6 days, THEN 1 tablet Every 12 (Twelve) Hours for 90 days. Indications: DVT/PE (active thrombosis)   Start Date: May 23, 2024            Continue These Medications        Instructions Start Date   acetaminophen 325 MG tablet  Commonly known as: TYLENOL   650 mg, Oral, Every 4 Hours PRN      albuterol (2.5 MG/3ML) 0.083% nebulizer solution  Commonly known as: " PROVENTIL   2.5 mg, Nebulization, Every 6 Hours PRN      calcium carbonate 500 MG chewable tablet  Commonly known as: TUMS   2 tablets, Oral, Every 4 Hours PRN      dicyclomine 10 MG capsule  Commonly known as: BENTYL   10 mg, Oral, 3 Times Daily PRN      FLUoxetine 20 MG capsule  Commonly known as: PROzac   TAKE 1 CAPSULE DAILY      gabapentin 300 MG capsule  Commonly known as: NEURONTIN   300 mg, Oral, 3 Times Daily      guaiFENesin 600 MG 12 hr tablet  Commonly known as: MUCINEX   1,200 mg, Oral, 2 Times Daily      Multivitamin Adult tablet tablet  Generic drug: multivitamin with minerals   1 tablet, Oral, Daily      ondansetron ODT 4 MG disintegrating tablet  Commonly known as: ZOFRAN-ODT   4 mg, Translingual, Every 6 Hours PRN      pantoprazole 40 MG EC tablet  Commonly known as: PROTONIX   40 mg, Oral, Daily      sodium chloride 7 % nebulizer solution nebulizer solution   4 mL, Nebulization, Every 4 Hours PRN      sucralfate 1 g tablet  Commonly known as: Carafate   1 g, Oral, 4 Times Daily      tiZANidine 2 MG tablet  Commonly known as: ZANAFLEX   2 mg, Oral, Daily PRN      Umeclidinium-Vilanterol 62.5-25 MCG/ACT aerosol powder  inhaler  Commonly known as: ANORO ELLIPTA   1 puff, Inhalation, Daily - RT               Allergies   Allergen Reactions    Daliresp [Roflumilast] Anaphylaxis    Latex Rash    Sulfa Antibiotics Rash       Discharge Disposition:  Skilled Nursing Facility (DC - External)      Discharge Diet:  Diet Order   Procedures    Diet: Cardiac; Healthy Heart (2-3 Na+); Fluid Consistency: Thin (IDDSI 0)       Discharge Activity:   Activity Instructions       Activity as Tolerated              CODE STATUS:    Code Status and Medical Interventions:   Ordered at: 05/22/24 0024     Code Status (Patient has no pulse and is not breathing):    No CPR (Do Not Attempt to Resuscitate)     Medical Interventions (Patient has pulse or is breathing):    Full Support       No future appointments.   Contact  information for follow-up providers       Alfonso Goldstein MD .    Specialty: Family Medicine  Contact information:  [address]  [address]  [facility] [address]  [phone]               Gab Sandoval MD Follow up in 2 week(s).    Specialty: Vascular Surgery  Contact information:  [address]  [address]  [facility] [address]  [phone]                       Contact information for after-discharge care       Destination       [facility] .    Service: Nursing Home  Contact information:  [address] [facility] [id number] [phone]                                   Time Spent on Discharge:  Greater than 30 minutes spent on discharge management including final examination, discussion of hospital stay and patient education, preparation of records, medication reconciliation, follow up planning      Roderick Foreman MD  [facility]  05/23/24  08:47 EDT

## 2024-05-23 NOTE — PLAN OF CARE
Goal Outcome Evaluation:  Plan of Care Reviewed With: patient        Progress: improving  Outcome Evaluation: Pt instructed on Eliquis and side effects. Appetite good. Up with assist of walker to bathroom. LBM 05/23. Right leg wrapped with ace wraps. VSS. Report called to facility and pt transfered per wheelchair van.

## 2024-05-23 NOTE — PLAN OF CARE
Goal Outcome Evaluation:  Plan of Care Reviewed With: patient        Progress: improving  Outcome Evaluation: Pt was agreeable to workin with PT. He was on BSC on arrival and stood with Alejandra and rwx. He ambualted 60' with CGA and rwx. Session ended with pt in bathroom and nsg notified. Pt will benefit from continued skilled PT addressing limitations in functional mobility to maximize safety and independence.      Anticipated Discharge Disposition (PT): skilled nursing facility

## 2024-05-23 NOTE — CASE MANAGEMENT/SOCIAL WORK
Case Management Discharge Note      Final Note: [facility] IC level via [facility] wheelchair van    Provided Post Acute Provider List?: N/A  N/A Provider List Comment: Return to [facility]    Selected Continued Care - Discharged on 5/23/2024 Admission date: 5/21/2024 - Discharge disposition: Skilled Nursing Facility (DC - External)      Destination Coordination complete.      Service Provider Selected Services Address Phone Fax Patient Preferred    [facility] [place] [address] [place] [address] [phone] --              Durable Medical Equipment    No services have been selected for the patient.                Dialysis/Infusion    No services have been selected for the patient.                Home Medical Care    No services have been selected for the patient.                Therapy    No services have been selected for the patient.                Community Resources    No services have been selected for the patient.                Community & DME    No services have been selected for the patient.                    Selected Continued Care - Prior Encounters Includes continued care and service providers with selected services from prior encounters from 2/21/2024 to 5/23/2024      Discharged on 4/20/2024 Admission date: 4/18/2024 - Discharge disposition: Home or Self Care      Destination       Service Provider Selected Services Address Phone Fax Patient Preferred    [facility] [address] [address] [phone] --                      Discharged on 4/9/2024 Admission date: 4/8/2024 - Discharge disposition: Skilled Nursing Facility (DC - External)      Destination       Service Provider Selected Services Address Phone Fax Patient Preferred    [facility] [address] [address] [phone] --                                Final Discharge Disposition Code: 04 - intermediate care facility

## 2024-05-26 LAB
BACTERIA SPEC AEROBE CULT: NORMAL
BACTERIA SPEC AEROBE CULT: NORMAL

## 2024-06-04 ENCOUNTER — HOSPITAL ENCOUNTER (EMERGENCY)
Facility: HOSPITAL | Age: 84
Discharge: HOME | End: 2024-06-04
Attending: EMERGENCY MEDICINE | Admitting: EMERGENCY MEDICINE
Payer: MEDICARE

## 2024-06-04 ENCOUNTER — APPOINTMENT (OUTPATIENT)
Dept: CT IMAGING | Facility: HOSPITAL | Age: 84
End: 2024-06-04
Payer: MEDICARE

## 2024-06-04 VITALS
RESPIRATION RATE: 16 BRPM | HEART RATE: 63 BPM | SYSTOLIC BLOOD PRESSURE: 140 MMHG | OXYGEN SATURATION: 100 % | TEMPERATURE: 97.3 F | DIASTOLIC BLOOD PRESSURE: 63 MMHG

## 2024-06-04 DIAGNOSIS — S09.90XA CLOSED HEAD INJURY, INITIAL ENCOUNTER: ICD-10-CM

## 2024-06-04 DIAGNOSIS — S01.81XA FACIAL LACERATION, INITIAL ENCOUNTER: ICD-10-CM
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
## 2024-06-04 PROCEDURE — 72125 CT NECK SPINE W/O DYE: CPT

## 2024-06-04 PROCEDURE — 70450 CT HEAD/BRAIN W/O DYE: CPT

## 2024-06-04 PROCEDURE — 99284 EMERGENCY DEPT VISIT MOD MDM: CPT

## 2024-06-04 NOTE — ED PROVIDER NOTES
EMERGENCY DEPARTMENT ENCOUNTER    Room Number:  09/09  Date of encounter:  6/4/2024  PCP: Alfonso Goldstein MD  Historian: Patient  Chronic or social conditions impacting care (social determinants of health): Nursing home resident    HPI:  Chief Complaint: Fall  A complete HPI/ROS/PMH/PSH/SH/FH are unobtainable due to: Nothing    Context: Tony Leonard is a 84 y.o. male with a history of COPD, coronary artery disease, recent DVT and PE.  He presents to the ED c/o acute injuries sustained in a fall prior to arrival.  Patient reports rolling out of bed hitting the right side of his forehead on the ground.  He denies any loss of consciousness.  He does have a small laceration to his forehead.  He is on Eliquis for history of A-fib, DVT, PE.  He denies any other significant injuries.  He continues to have right leg swelling.  This was noted during his last admission.    Review of prior external notes (non-ED):   I reviewed admission from 5/21/2024 through 5/23/2024.  Patient admitted for PE and DVT.    Review of prior external test results outside of this encounter:  I reviewed a CTA of the chest from 5/21/2024.  This showed a PE in the left upper lobe.    PAST MEDICAL HISTORY  Active Ambulatory Problems     Diagnosis Date Noted    Thrush, oral 03/11/2016    ADD (attention deficit disorder) 03/11/2016    Hemorrhoids 03/11/2016    Bronchiectasis with acute lower respiratory infection 03/11/2016    Diverticul disease small and large intestine, no perforati or abscess 03/11/2016    Benign non-nodular prostatic hyperplasia without lower urinary tract symptoms 03/11/2016    Gastroesophageal reflux disease 03/11/2016    Malaise and fatigue 03/11/2016    Environmental allergies 04/25/2016    Hemoptysis 04/27/2016    Dyslipidemia 05/11/2016    Primary osteoarthritis involving multiple joints 05/11/2016    Pneumonia of right lower lobe due to infectious organism 10/03/2016    Abnormal EKG 10/04/2016    COPD (chronic  obstructive pulmonary disease) 10/04/2016    Mild malnutrition 03/28/2017    Acute on chronic respiratory failure with hypoxia 04/27/2017    Fracture, intertrochanteric, right femur, closed, initial encounter 01/16/2018    Chronic diastolic CHF (congestive heart failure) 01/16/2018    Hematoma of frontal scalp 01/16/2018    Postoperative anemia due to acute blood loss 01/19/2018    Urinary retention 01/25/2018    Hemorrhagic disorder due to circulating anticoagulants 01/29/2018    History of fracture of right hip 02/22/2018    Closed fracture of right hip with routine healing 02/28/2018    Chronic low back pain with sciatica 06/21/2018    Change in bowel function 04/18/2019    Rectal bleeding 04/18/2019    Prostate cancer 04/22/2019    On home oxygen therapy 09/24/2019    Acute viral bronchitis 12/29/2019    Peripheral neuropathy 07/01/2021    Plantar fasciitis 09/08/2021    HTN (hypertension) 05/06/2022    COPD exacerbation 05/07/2022    Bilateral lower extremity edema 05/07/2022    Microscopic hematuria 05/08/2022    Acute midline low back pain with right-sided sciatica 08/01/2022    Spinal stenosis, lumbar region with neurogenic claudication 08/02/2022    Compression deformity of vertebra 08/02/2022    Rectal bleed 09/23/2022    Esophagitis 09/24/2022    Angiectasia 09/27/2022    Hiatal hernia 09/27/2022    Overweight (BMI 25.0-29.9) 11/14/2022    Leukocytosis 11/15/2022    Bilateral hip pain 11/21/2022    Elevated troponin level not due myocardial infarction 12/10/2022    Nocturnal hypoxia 12/10/2022    Pneumonia of right upper lobe due to infectious organism 12/29/2022    NSTEMI (non-ST elevated myocardial infarction) 01/29/2023    Chronic respiratory failure with hypoxia 01/29/2023    Paroxysmal atrial fibrillation 02/17/2023    Acute exacerbation of chronic obstructive pulmonary disease (COPD) 05/10/2023    Anemia 05/10/2023    Non-compliant patient 05/11/2023    Hypokalemia 10/01/2020    Spondylolisthesis of  lumbar region 08/14/2018    Elevated troponin 12/22/2023    Rhabdomyolysis 12/26/2023    Multiple contusions 12/26/2023    Fall 12/26/2023    Dizziness 04/09/2024    Contusion of hip 04/09/2024    Pulmonary embolism 05/21/2024     Resolved Ambulatory Problems     Diagnosis Date Noted    Pneumonia of both lower lobes due to infectious organism 03/11/2016    Pneumonia of right upper lobe due to infectious organism 04/22/2016    COPD exacerbation 04/25/2016    GERD (gastroesophageal reflux disease) 04/25/2016    Chronic respiratory failure with hypoxia 10/04/2016    Bacterial lobar pneumonia 12/27/2016    Pneumonia of left lower lobe due to infectious organism 03/26/2017    Bronchitis 06/01/2017    COPD exacerbation 06/17/2017    Leukocytosis 01/16/2018    Right upper lobe pneumonia 01/20/2018    Mucus plugging of bronchi 01/16/2018    COPD exacerbation 04/16/2018    Degenerative joint disease (DJD) of hip 09/23/2019    Bronchiectasis with (acute) exacerbation 12/28/2019    Septic shock 11/26/2022    Acute saddle pulmonary embolism without acute cor pulmonale  - 12/11/2022 12/11/2022    Chest pain 04/19/2024     Past Medical History:   Diagnosis Date    ADHD (attention deficit hyperactivity disorder)     Bronchiectasis     CHF (congestive heart failure)     Colon polyp     Diverticulosis     Hard of hearing     Pneumonia          PAST SURGICAL HISTORY  Past Surgical History:   Procedure Laterality Date    BRONCHOSCOPY N/A 04/30/2016    Procedure: BRONCHOSCOPY with BAL of right lower lobe and left  lower lobe.  ;  Surgeon: Nando Diaz MD;  Location: [facility] ENDOSCOPY;  Service:     BRONCHOSCOPY N/A 04/28/2017    Procedure: BRONCHOSCOPY;  Surgeon: Katharina Salter MD;  Location: [facility] ENDOSCOPY;  Service:     BRONCHOSCOPY Bilateral 06/29/2017    Procedure: BRONCHOSCOPY WITH WASHINGS;  Surgeon: Katharina Salter MD;  Location: [facility] ENDOSCOPY;  Service:     BRONCHOSCOPY N/A 01/22/2018    Procedure: BRONCHOSCOPY AT  BEDSIDE with BAL;  Surgeon: Katharina Salter MD;  Location: [facility] ENDOSCOPY;  Service:     BRONCHOSCOPY N/A 01/30/2018    Procedure: BRONCHOSCOPY with BAL and washing;  Surgeon: Shreyas Wild MD;  Location: [facility] ENDOSCOPY;  Service:     BRONCHOSCOPY Bilateral 04/24/2018    Procedure: BRONCHOSCOPY WITH WASHING;  Surgeon: Katharina Salter MD;  Location: [facility] ENDOSCOPY;  Service: Pulmonary    BRONCHOSCOPY N/A 12/28/2019    Procedure: BRONCHOSCOPY with bilateral washing;  Surgeon: Katharina Salter MD;  Location: [facility] ENDOSCOPY;  Service: Pulmonary    BRONCHOSCOPY Bilateral 01/04/2023    Procedure: BRONCHOSCOPY with lavage;  Surgeon: Katharina Salter MD;  Location: [facility] ENDOSCOPY;  Service: Pulmonary;  Laterality: Bilateral;  mucus plugging    CARDIAC CATHETERIZATION N/A 01/29/2023    Procedure: Left Heart Cath;  Surgeon: Johnnie Ang MD;  Location: [facility] CATH INVASIVE LOCATION;  Service: Cardiology;  Laterality: N/A;    CARDIAC CATHETERIZATION N/A 01/29/2023    Procedure: Coronary angiography;  Surgeon: Johnnie Ang MD;  Location: [facility] CATH INVASIVE LOCATION;  Service: Cardiology;  Laterality: N/A;    COLONOSCOPY  05/17/2013    eh, ih, tort, sig tics    COLONOSCOPY N/A 05/10/2019    Non-thrombosed external hemorrhoids found on perianal exam, Diverticulosis, Tortuous colon, One 5 mm polyp in the mid ascending colon, IH. Path: Tubular adenoma.     COLONOSCOPY N/A 09/24/2022    Procedure: COLONOSCOPY to cecum and TI with argon plasma coagulation.;  Surgeon: Bruce Black MD;  Location: [facility] ENDOSCOPY;  Service: Gastroenterology;  Laterality: N/A;  pre- GI bleeding  post- radiation proctitis, diverticulosis    ENDOSCOPY  03/19/2015    z line irreg, hh    ENDOSCOPY N/A 09/24/2022    Procedure: ESOPHAGOGASTRODUODENOSCOPY;  Surgeon: Bruce Black MD;  Location: [facility] ENDOSCOPY;  Service: Gastroenterology;  Laterality: N/A;  pre- GI bleeding  post- esophagitis, hiatal hernia    HIP OPEN  REDUCTION Right 01/17/2018    Procedure: HIP OPEN REDUCTION INTERNAL FIXATION WITH DYNAMIC HIP SCREW;  Surgeon: Les Black MD;  Location: [facility] OR;  Service:     SPINE SURGERY      TONSILLECTOMY      TOTAL HIP ARTHROPLASTY REVISION Right 09/23/2019    Procedure: HIP  REVISION RIGHT;  Surgeon: Isauro Warner MD;  Location: [facility] OR;  Service: Orthopedics         FAMILY HISTORY  Family History   Problem Relation Age of Onset    Thyroid disease Mother     No Known Problems Father     Malig Hyperthermia Neg Hx          SOCIAL HISTORY  Social History     Socioeconomic History    Marital status: Single   Tobacco Use    Smoking status: Never    Smokeless tobacco: Never   Vaping Use    Vaping status: Never Used   Substance and Sexual Activity    Alcohol use: No     Comment: red wine occassional    Drug use: No    Sexual activity: Defer         ALLERGIES  Daliresp [roflumilast], Latex, and Sulfa antibiotics        REVIEW OF SYSTEMS  All systems reviewed and negative except for those discussed in HPI.       PHYSICAL EXAM    I have reviewed the triage vital signs and nursing notes.    ED Triage Vitals [06/04/24 0724]   Temp Heart Rate Resp BP SpO2   97.3 °F (36.3 °C) 76 16 121/74 95 %      Temp src Heart Rate Source Patient Position BP Location FiO2 (%)   Tympanic Monitor -- -- --       Physical Exam  GENERAL: Alert, oriented, elderly, not distressed  HENT: Superficial laceration to the right forehead without significant swelling.  No dental or nasal injury.  No cervical tenderness or vertebral step-off.  EYES: no scleral icterus, EOMI  CV: regular rhythm, regular rate, no murmur  RESPIRATORY: normal effort, CTA  ABDOMEN: soft, nontender  MUSCULOSKELETAL: Swelling of the right lower extremity.  Pulses intact distally.  Skin color normal to the lower extremities.  NEURO: alert, moves all extremities, follows commands  SKIN: warm, dry    RADIOLOGY  CT Head Without Contrast, CT Cervical Spine Without  Contrast    Result Date: 6/4/2024  EMERGENCY CT SCAN OF THE HEAD AND CERVICAL SPINE WITHOUT CONTRAST ON 06/04/2024  CLINICAL HISTORY: Patient fell and had right forehead trauma and has headache and neck pain. This patient is anticoagulated.  HEAD CT TECHNIQUE:  Spiral CT images were obtained from the base of the skull to the vertex without intravenous contrast. The images were reformatted and are submitted in 3 mm thick axial, sagittal and coronal CT sections with brain algorithm and 2 mm thick axial CT sections with high-resolution bone algorithm.  This is correlated to a prior head CT on 04/08/2024.  FINDINGS:  There is some mild low-density in the periventricular white matter consistent with mild small vessel disease.  The remainder of the brain parenchyma is normal in attenuation. The ventricles are normal in size. I see no focal mass effect. There is no midline shift. No extra-axial fluid collections are identified. There is no evidence of acute intracranial hemorrhage. There is a small scalp hematoma over the anterior lateral right frontal bone from today's head trauma. No underlying acute skull fracture is identified. The calvarium and skull base are normal in appearance. Patient has had previous paranasal sinus surgery with bilateral uncinectomies and partial ethmoidectomies and there is mild mucosal thickening in the right maxillary as well as inferior medial left frontal sinus and left frontal recess. Otherwise, the paranasal sinuses, mastoid air cells and middle ear cavities are clear.      1. No acute intracranial abnormality is identified. 2. There is mild small vessel disease in the cerebral white matter and mild cerebral atrophy.  The lateral and third ventricles are minimally prominent in size, felt to be on the basis of central volume loss or atrophy. 3. Previous paranasal sinus surgery with bilateral uncinectomies and partial ethmoidectomies. There is mild mucosal thickening in the medial left  frontal sinus, left frontal recess and right maxillary sinus, otherwise the paranasal sinuses are clear. 4. Small scalp hematoma over the anterolateral right frontal bone from today's head trauma. The remainder of the head CT is normal with no acute skull fracture or intracranial hemorrhage identified.   CERVICAL SPINE CT TECHNIQUE:  Spiral CT images were obtained from the skull base down to the T4 thoracic level and images were reformatted and submitted in 2 mm thick axial and sagittal CT sections with soft tissue algorithm and 1 mm thick axial, sagittal and coronal CT sections with high-resolution bone algorithm.  This is correlated to a prior cervical spine CT on 04/08/2024 as well as a CT scan of the cervical spine on 12/26/2023.  FINDINGS: The atlantooccipital articulation is within normal limits.  At C1-2, there are some arthritic changes at the atlantodental interval with mild marginal spurring off the anterior ring of C1 and the odontoid. Otherwise, the C1-2 level is normal in appearance.  At C2-3, there is minimal left and moderate right facet overgrowth. There is a posterior central disc osteophyte complex that abuts the anterior midline of the cord mildly narrowing the canal. There is some uncovertebral joint hypertrophy.  There is mild right, but no left foraminal narrowing.  This patient has had extensive prior cervical spine surgery with an anterior cervical discectomy and fusion procedure from C3 to C7 with an anterior plate extending from the anterior mid body of C3 to the anterior inferior body of C7 anchored by screws to the C3, C4, C5, C6 and C7 vertebrae and there are disc implants in the C3-4, C4-5, C5-6 and C6-7 disc spaces. There is good alignment of the hardware. There is some residual posterior endplate spurring mild to moderately narrowing the right and left lateral aspect of the canal at C5-6 and mild to moderately narrowing the canal at C6-7.  There is some residual uncovertebral joint  spurring into the foramina with mild right and mild-to-moderate left bony foraminal narrowing at C3-4 and there is mild-to-moderate bilateral bony foraminal narrowing at C5-6 and C6-7.  At C7-T1, there is mild-to-moderate bilateral facet overgrowth.  There is no canal narrowing.  There is mild left, but no right foraminal narrowing.  No acute fracture is seen in the cervical spine. There is some concavity of the superior body and endplate of T1.  It is unchanged when compared to prior cervical spine CTs dating back to 12/26/2023, compatible with a mild old compression deformity with 10 to 20% loss of anterior and central vertebral body height.  IMPRESSION: 1. No acute fracture is seen in the cervical spine. There is a mild old compression deformity involving the superior body and endplate of the T1 thoracic level with 10 to 20% loss of anterior and central and no loss of posterior vertebral body height, unchanged dating back to cervical spine CT 12/26/2023. 2. There has been a previous anterior cervical discectomy and fusion procedure from C3 to C7.  There is some residual cervical spondylosis as described in great detail above.  Radiation dose reduction techniques were utilized, including automated exposure control and exposure modulation based on body size.        I ordered the above noted radiological studies. Reviewed by me and discussed with radiologist.  See dictation for official radiology interpretation.      MEDICATIONS GIVEN IN ER    Medications - No data to display      ADDITIONAL ORDERS CONSIDERED BUT NOT ORDERED:  Considered admission however patient was just discharged.  He has stable vitals.  His right leg is swollen however this was the case when he was admitted.    Laceration Repair    Date/Time: 6/4/2024 9:08 AM    Performed by: Temo العراقي PA  Authorized by: Lexa Montesinos MD    Consent:     Consent obtained:  Verbal    Consent given by:  Patient  Universal protocol:     Patient identity  confirmed:  Verbally with patient  Anesthesia:     Anesthesia method:  None  Laceration details:     Location:  Face    Face location:  Forehead    Length (cm):  2.3  Pre-procedure details:     Preparation:  Patient was prepped and draped in usual sterile fashion and imaging obtained to evaluate for foreign bodies  Treatment:     Area cleansed with:  Chlorhexidine    Amount of cleaning:  Standard    Irrigation solution:  Sterile saline  Skin repair:     Repair method:  Tissue adhesive  Approximation:     Approximation:  Close  Repair type:     Repair type:  Simple  Post-procedure details:     Dressing:  Open (no dressing)    Procedure completion:  Tolerated well, no immediate complications          PROGRESS, DATA ANALYSIS, CONSULTS, AND MEDICAL DECISION MAKING    All labs have been independently interpreted by myself.  All radiology studies have been independently interpreted by myself and discussed with radiologist dictating the report.   EKG's independently interpreted by myself.  Discussion below represents my analysis of pertinent findings related to patient's condition, differential diagnosis, treatment plan and final disposition.    I have discussed case with Dr. Montesinos, emergency room physician.  He has performed his own bedside examination and agrees with treatment plan.    ED Course as of 06/04/24 0908 Tue Jun 04, 2024   0738 Patient presents with injury sustained in a mechanical fall prior to arrival.  Patient rolled out of bed hitting his right forehead on the ground.  He is on Eliquis for history of DVT, PE, A-fib.  Neurologically intact at this time.  CT imaging of the brain and cervical spine ordered.    On exam, patient's right leg is edematous.  This was noted during his admission.  He is neurovascularly intact.  No evidence of phlegmasia elbow or cerulea dolens. [EE]   0900 CT imaging of the brain and cervical spine independently interpreted myself shows no obvious hemorrhage.    I discussed these  findings with Dr. Sherman.  No acute cervical fracture.  No acute intracranial hemorrhage. [EE]   0850 Patient's wound has been closed with skin adhesive.  He has a normal neurological exam.  We will discharge. [EE]      ED Course User Index  [EE] Temo العراقي PA       AS OF 09:08 EDT VITALS:    BP - 121/74  HR - 76  TEMP - 97.3 °F (36.3 °C) (Tympanic)  O2 SATS - 95%        DIAGNOSIS  Final diagnoses:   Fall, initial encounter   Closed head injury, initial encounter   Facial laceration, initial encounter         DISPOSITION  Discharged    Admission was considered but after careful review of the patient's presentation, physical examination, diagnostic results, and response to treatment the patient may be safely discharged with outpatient follow-up.         Dictated utilizing Dragon dictation     Temo العراقي PA  06/04/24 0958

## 2024-06-04 NOTE — ED TRIAGE NOTES
Patient had a trip and fall this am hitting his head on his dresser. He is from [facility]. He c/o left leg pain which he is currently being tx for a DVT. He has an abrasion to the right side of his forehead.

## 2024-06-04 NOTE — CASE MANAGEMENT/SOCIAL WORK
Discharge Planning Assessment  [facility]     Patient Name: Tony Leonard  MRN: [id number]  Today's Date: 6/4/2024    Admit Date: 6/4/2024        Discharge Needs Assessment    No documentation.                  Discharge Plan       Row Name 06/04/24 0946       Plan    Plan Comments Call placed to [facility] to schedule transport of pt back to [facility]- spoke w/Mili who gave an ETA of now- Updating primary RN                  Continued Care and Services - Admitted Since 6/4/2024    No active coordination exists for this encounter.       Selected Continued Care - Prior Encounters Includes continued care and service providers with selected services from prior encounters from 3/6/2024 to 6/4/2024      Discharged on 5/23/2024 Admission date: 5/21/2024 - Discharge disposition: Intermediate Care       Destination       Service Provider Selected Services Address Phone Fax Patient Preferred    [facility] [address] [id number] [phone] --                      Discharged on 4/20/2024 Admission date: 4/18/2024 - Discharge disposition: Home or Self Care      Destination       Service Provider Selected Services Address Phone Fax Patient Preferred    [facility] [address] [id number] [phone] --                      Discharged on 4/9/2024 Admission date: 4/8/2024 - Discharge disposition: Skilled Nursing Facility (DC - External)      Destination       Service Provider Selected Services Address Phone Fax Patient Preferred    [facility] [address] [id number] [phone] --                             Demographic Summary    No documentation.                  Functional Status    No documentation.                  Psychosocial    No documentation.                  Abuse/Neglect    No documentation.                  Legal    No  documentation.                  Substance Abuse    No documentation.                  Patient Forms    No documentation.                     Yessica Hodge RN

## 2024-06-04 NOTE — ED PROVIDER NOTES
MD ATTESTATION NOTE      HPI:  Chief Complaint: Fall  Context: Tony Leonard is a 84 y.o. male who presents to the ED by EMS from his rehab facility for a fall.  The patient states he tripped this morning and fell hitting his head on his dresser.  He states he was recently admitted for a DVT and is on Eliquis.  He feels that his right lower extremity is somewhat more swollen than before    General : 84-year-old patient is awake alert and oriented  HEENT: Abrasion/superficial laceration to right forehead with minimal C-spine tenderness  Ext: The patient's right lower extremity has 3+ pedal edema but is neurovascular intact.  The patient has no pain to palpation or range of motion of all 4 extremities.  Skin: No rash  Neuro: Awake with a nonfocal neuro exam  Psych: Normal mood and affect      Plan: Will obtain a CT head and C-spine for further evaluation.    The patient's CT head and C-spine are negative acute.  His forehead wound has been closed with glue adhesive.  He is stable for discharge back to his rehab facility and continue his anticoagulation.      SHARED VISIT: This visit was performed by BOTH a physician and an APC. The substantive portion of the medical decision making was performed by this attesting physician who made or approved the management plan and takes responsibility for patient management. All studies in the APC note (if performed) were independently interpreted by me.         Lexa Montesinos MD  06/04/24 0715

## 2024-06-12 ENCOUNTER — TELEPHONE (OUTPATIENT)
Dept: CARDIOLOGY | Facility: CLINIC | Age: 84
End: 2024-06-12

## 2024-10-27 NOTE — ED NOTES
"Nursing report ED to floor  Tony Leonard  82 y.o.  male    HPI :   Chief Complaint   Patient presents with    Shortness of Breath       Admitting doctor:   Danni Frye MD    Admitting diagnosis:   The primary encounter diagnosis was Pneumonia of right upper lobe due to infectious organism. Diagnoses of Other acute pulmonary embolism without acute cor pulmonale (HCC) and Acute on chronic congestive heart failure, unspecified heart failure type (HCC) were also pertinent to this visit.    Code status:   Current Code Status       Date Active Code Status Order ID Comments User Context       Prior            Allergies:   Daliresp [roflumilast], Latex, and Sulfa antibiotics    Isolation:   No active isolations    Intake and Output  No intake or output data in the 24 hours ending 12/29/22 1501    Weight:       12/29/22  1136   Weight: 79.8 kg (176 lb)       Most recent vitals:   Vitals:    12/29/22 1131 12/29/22 1136 12/29/22 1243 12/29/22 1401   BP: 136/76  153/82 147/62   BP Location:   Right arm Right arm   Patient Position:   Lying Lying   Pulse: 100  100 94   Resp:   16 16   Temp:       TempSrc:       SpO2: 95%  96% 93%   Weight:  79.8 kg (176 lb)     Height:  180.3 cm (71\")         Active LDAs/IV Access:   Lines, Drains & Airways       Active LDAs       Name Placement date Placement time Site Days    Peripheral IV 12/29/22 1158 Anterior;Right Forearm 12/29/22  1158  Forearm  less than 1                    Labs (abnormal labs have a star):   Labs Reviewed   CBC WITH AUTO DIFFERENTIAL - Abnormal; Notable for the following components:       Result Value    RBC 4.04 (*)     Lymphocyte % 16.2 (*)     Eosinophil % 13.3 (*)     Monocytes, Absolute 0.97 (*)     Eosinophils, Absolute 1.15 (*)     All other components within normal limits   LACTIC ACID, PLASMA - Normal   BNP (IN-HOUSE) - Normal    Narrative:     Among patients with dyspnea, NT-proBNP is highly sensitive for the detection of acute congestive heart failure. " "In addition NT-proBNP of <300 pg/ml effectively rules out acute congestive heart failure with 99% negative predictive value.    Results may be falsely decreased if patient taking Biotin.     TROPONIN (IN-HOUSE) - Normal    Narrative:     Troponin T Reference Range:  <= 0.03 ng/mL-   Negative for AMI  >0.03 ng/mL-     Abnormal for myocardial necrosis.  Clinicians would have to utilize clinical acumen, EKG, Troponin and serial changes to determine if it is an Acute Myocardial Infarction or myocardial injury due to an underlying chronic condition.       Results may be falsely decreased if patient taking Biotin.     PROCALCITONIN - Normal    Narrative:     As a Marker for Sepsis (Non-Neonates):    1. <0.5 ng/mL represents a low risk of severe sepsis and/or septic shock.  2. >2 ng/mL represents a high risk of severe sepsis and/or septic shock.    As a Marker for Lower Respiratory Tract Infections that require antibiotic therapy:    PCT on Admission    Antibiotic Therapy       6-12 Hrs later    >0.5                Strongly Recommended  >0.25 - <0.5        Recommended   0.1 - 0.25          Discouraged              Remeasure/reassess PCT  <0.1                Strongly Discouraged     Remeasure/reassess PCT    As 28 day mortality risk marker: \"Change in Procalcitonin Result\" (>80% or <=80%) if Day 0 (or Day 1) and Day 4 values are available. Refer to http://www.[employer] 401s-pct-calculator.com    Change in PCT <=80%  A decrease of PCT levels below or equal to 80% defines a positive change in PCT test result representing a higher risk for 28-day all-cause mortality of patients diagnosed with severe sepsis for septic shock.    Change in PCT >80%  A decrease of PCT levels of more than 80% defines a negative change in PCT result representing a lower risk for 28-day all-cause mortality of patients diagnosed with severe sepsis or septic shock.      BLOOD CULTURE   BLOOD CULTURE   COMPREHENSIVE METABOLIC PANEL    Narrative:     GFR Normal " >60  Chronic Kidney Disease <60  Kidney Failure <15    The GFR formula is only valid for adults with stable renal function between ages 18 and 70.   RAINBOW DRAW    Narrative:     The following orders were created for panel order [facility] Draw.  Procedure                               Abnormality         Status                     ---------                               -----------         ------                     Green Top (Gel)[[id number]]                                  Final result               Lavender Top[[id number]]                                     Final result               Gold Top - SST[[id number]]                                   Final result               Light Blue Top[[id number]]                                   Final result                 Please view results for these tests on the individual orders.   CBC AND DIFFERENTIAL    Narrative:     The following orders were created for panel order CBC & Differential.  Procedure                               Abnormality         Status                     ---------                               -----------         ------                     CBC Auto Differential[[id number]]        Abnormal            Final result                 Please view results for these tests on the individual orders.   GREEN TOP   LAVENDER TOP   GOLD TOP - SST   LIGHT BLUE TOP       EKG:   ECG 12 Lead Dyspnea   Preliminary Result   HEART RATE= 97  bpm   RR Interval= 619  ms   NM Interval= 191  ms   P Horizontal Axis= 10  deg   P Front Axis= 64  deg   QRSD Interval= 74  ms   QT Interval= 357  ms   QRS Axis= 41  deg   T Wave Axis= 73  deg   - ABNORMAL ECG -   Sinus rhythm   Ventricular trigeminy   Low voltage, extremity leads   Abnormal R-wave progression, early transition   Electronically Signed By:    Date and Time of Study: 2022-12-29 11:35:27          Meds given in ED:   Medications   sodium chloride 0.9 % flush 10 mL (has no administration in time range)   apixaban (ELIQUIS) tablet  5 mg (has no administration in time range)   furosemide (LASIX) injection 80 mg (80 mg Intravenous Given 12/29/22 1416)       Imaging results:  XR Chest 1 View    Result Date: 12/29/2022  Hazy opacity at the right apex, follow-up recommended.  This report was finalized on 12/29/2022 12:34 PM by Dr. Tj Evans M.D.       Ambulatory status:   - full assist      Social issues:   Social History     Socioeconomic History    Marital status: Single   Tobacco Use    Smoking status: Never    Smokeless tobacco: Never   Vaping Use    Vaping Use: Never used   Substance and Sexual Activity    Alcohol use: No     Comment: red wine occassional    Drug use: No    Sexual activity: Defer       NIH Stroke Scale:         Jann Hubbard RN  12/29/22 15:01 EST         No

## 2024-11-25 ENCOUNTER — APPOINTMENT (OUTPATIENT)
Dept: GENERAL RADIOLOGY | Facility: HOSPITAL | Age: 84
End: 2024-11-25
Payer: MEDICARE

## 2024-11-25 ENCOUNTER — HOSPITAL ENCOUNTER (OUTPATIENT)
Facility: HOSPITAL | Age: 84
Setting detail: OBSERVATION
Discharge: NURSING, REHAB OR LONG-TERM CARE | End: 2024-11-27
Attending: EMERGENCY MEDICINE | Admitting: STUDENT IN AN ORGANIZED HEALTH CARE EDUCATION/TRAINING PROGRAM
Payer: MEDICARE

## 2024-11-25 DIAGNOSIS — J44.1 COPD WITH ACUTE EXACERBATION: Primary | ICD-10-CM

## 2024-11-25 DIAGNOSIS — D64.9 CHRONIC ANEMIA: ICD-10-CM

## 2024-11-25 DIAGNOSIS — G89.29 CHRONIC LOW BACK PAIN WITH SCIATICA, SCIATICA LATERALITY UNSPECIFIED, UNSPECIFIED BACK PAIN LATERALITY: ICD-10-CM

## 2024-11-25 DIAGNOSIS — M54.40 CHRONIC LOW BACK PAIN WITH SCIATICA, SCIATICA LATERALITY UNSPECIFIED, UNSPECIFIED BACK PAIN LATERALITY: ICD-10-CM

## 2024-11-25 LAB
ALBUMIN SERPL-MCNC: 3.4 G/DL (ref 3.5–5.2)
ALBUMIN/GLOB SERPL: 1.2 G/DL
ALP SERPL-CCNC: 71 U/L (ref 39–117)
ALT SERPL W P-5'-P-CCNC: 11 U/L (ref 1–41)
ANION GAP SERPL CALCULATED.3IONS-SCNC: 10.2 MMOL/L (ref 5–15)
ANION GAP SERPL CALCULATED.3IONS-SCNC: 10.5 MMOL/L (ref 5–15)
AST SERPL-CCNC: 31 U/L (ref 1–40)
B PARAPERT DNA SPEC QL NAA+PROBE: NOT DETECTED
B PERT DNA SPEC QL NAA+PROBE: NOT DETECTED
BASOPHILS # BLD AUTO: 0.01 10*3/MM3 (ref 0–0.2)
BASOPHILS NFR BLD AUTO: 0.1 % (ref 0–1.5)
BILIRUB SERPL-MCNC: 0.4 MG/DL (ref 0–1.2)
BUN SERPL-MCNC: 14 MG/DL (ref 8–23)
BUN SERPL-MCNC: 15 MG/DL (ref 8–23)
BUN/CREAT SERPL: 14.6 (ref 7–25)
BUN/CREAT SERPL: 15.2 (ref 7–25)
C PNEUM DNA NPH QL NAA+NON-PROBE: NOT DETECTED
CALCIUM SPEC-SCNC: 8.7 MG/DL (ref 8.6–10.5)
CALCIUM SPEC-SCNC: 8.8 MG/DL (ref 8.6–10.5)
CHLORIDE SERPL-SCNC: 102 MMOL/L (ref 98–107)
CHLORIDE SERPL-SCNC: 103 MMOL/L (ref 98–107)
CO2 SERPL-SCNC: 25.5 MMOL/L (ref 22–29)
CO2 SERPL-SCNC: 25.8 MMOL/L (ref 22–29)
CREAT SERPL-MCNC: 0.92 MG/DL (ref 0.76–1.27)
CREAT SERPL-MCNC: 1.03 MG/DL (ref 0.76–1.27)
DEPRECATED RDW RBC AUTO: 45.6 FL (ref 37–54)
DEPRECATED RDW RBC AUTO: 46 FL (ref 37–54)
EGFRCR SERPLBLD CKD-EPI 2021: 70.7 ML/MIN/1.73
EGFRCR SERPLBLD CKD-EPI 2021: 81 ML/MIN/1.73
EOSINOPHIL # BLD AUTO: 0 10*3/MM3 (ref 0–0.4)
EOSINOPHIL NFR BLD AUTO: 0 % (ref 0.3–6.2)
ERYTHROCYTE [DISTWIDTH] IN BLOOD BY AUTOMATED COUNT: 16.3 % (ref 12.3–15.4)
ERYTHROCYTE [DISTWIDTH] IN BLOOD BY AUTOMATED COUNT: 16.4 % (ref 12.3–15.4)
FLUAV SUBTYP SPEC NAA+PROBE: NOT DETECTED
FLUBV RNA ISLT QL NAA+PROBE: NOT DETECTED
GEN 5 2HR TROPONIN T REFLEX: 28 NG/L
GLOBULIN UR ELPH-MCNC: 2.8 GM/DL
GLUCOSE SERPL-MCNC: 107 MG/DL (ref 65–99)
GLUCOSE SERPL-MCNC: 119 MG/DL (ref 65–99)
HADV DNA SPEC NAA+PROBE: NOT DETECTED
HCOV 229E RNA SPEC QL NAA+PROBE: NOT DETECTED
HCOV HKU1 RNA SPEC QL NAA+PROBE: NOT DETECTED
HCOV NL63 RNA SPEC QL NAA+PROBE: NOT DETECTED
HCOV OC43 RNA SPEC QL NAA+PROBE: NOT DETECTED
HCT VFR BLD AUTO: 29.1 % (ref 37.5–51)
HCT VFR BLD AUTO: 30 % (ref 37.5–51)
HGB BLD-MCNC: 9.1 G/DL (ref 13–17.7)
HGB BLD-MCNC: 9.4 G/DL (ref 13–17.7)
HMPV RNA NPH QL NAA+NON-PROBE: NOT DETECTED
HPIV1 RNA ISLT QL NAA+PROBE: NOT DETECTED
HPIV2 RNA SPEC QL NAA+PROBE: NOT DETECTED
HPIV3 RNA NPH QL NAA+PROBE: NOT DETECTED
HPIV4 P GENE NPH QL NAA+PROBE: NOT DETECTED
IMM GRANULOCYTES # BLD AUTO: 0.02 10*3/MM3 (ref 0–0.05)
IMM GRANULOCYTES NFR BLD AUTO: 0.3 % (ref 0–0.5)
LYMPHOCYTES # BLD AUTO: 2.22 10*3/MM3 (ref 0.7–3.1)
LYMPHOCYTES NFR BLD AUTO: 29.4 % (ref 19.6–45.3)
M PNEUMO IGG SER IA-ACNC: NOT DETECTED
MCH RBC QN AUTO: 24.2 PG (ref 26.6–33)
MCH RBC QN AUTO: 24.3 PG (ref 26.6–33)
MCHC RBC AUTO-ENTMCNC: 31.3 G/DL (ref 31.5–35.7)
MCHC RBC AUTO-ENTMCNC: 31.3 G/DL (ref 31.5–35.7)
MCV RBC AUTO: 77.4 FL (ref 79–97)
MCV RBC AUTO: 77.5 FL (ref 79–97)
MONOCYTES # BLD AUTO: 0.69 10*3/MM3 (ref 0.1–0.9)
MONOCYTES NFR BLD AUTO: 9.1 % (ref 5–12)
NEUTROPHILS NFR BLD AUTO: 4.61 10*3/MM3 (ref 1.7–7)
NEUTROPHILS NFR BLD AUTO: 61.1 % (ref 42.7–76)
NRBC BLD AUTO-RTO: 0 /100 WBC (ref 0–0.2)
NT-PROBNP SERPL-MCNC: 410 PG/ML (ref 0–1800)
PLATELET # BLD AUTO: 290 10*3/MM3 (ref 140–450)
PLATELET # BLD AUTO: 305 10*3/MM3 (ref 140–450)
PMV BLD AUTO: 9.1 FL (ref 6–12)
PMV BLD AUTO: 9.2 FL (ref 6–12)
POTASSIUM SERPL-SCNC: 3.9 MMOL/L (ref 3.5–5.2)
POTASSIUM SERPL-SCNC: 4.5 MMOL/L (ref 3.5–5.2)
PROCALCITONIN SERPL-MCNC: 0.06 NG/ML (ref 0–0.25)
PROT SERPL-MCNC: 6.2 G/DL (ref 6–8.5)
QT INTERVAL: 351 MS
QTC INTERVAL: 446 MS
RBC # BLD AUTO: 3.76 10*6/MM3 (ref 4.14–5.8)
RBC # BLD AUTO: 3.87 10*6/MM3 (ref 4.14–5.8)
RHINOVIRUS RNA SPEC NAA+PROBE: NOT DETECTED
RSV RNA NPH QL NAA+NON-PROBE: NOT DETECTED
SARS-COV-2 RNA NPH QL NAA+NON-PROBE: NOT DETECTED
SODIUM SERPL-SCNC: 138 MMOL/L (ref 136–145)
SODIUM SERPL-SCNC: 139 MMOL/L (ref 136–145)
TROPONIN T DELTA: -5 NG/L
TROPONIN T SERPL HS-MCNC: 33 NG/L
TROPONIN T SERPL HS-MCNC: 38 NG/L
WBC NRBC COR # BLD AUTO: 6.67 10*3/MM3 (ref 3.4–10.8)
WBC NRBC COR # BLD AUTO: 7.55 10*3/MM3 (ref 3.4–10.8)

## 2024-11-25 PROCEDURE — 94799 UNLISTED PULMONARY SVC/PX: CPT

## 2024-11-25 PROCEDURE — 80053 COMPREHEN METABOLIC PANEL: CPT | Performed by: EMERGENCY MEDICINE

## 2024-11-25 PROCEDURE — 84484 ASSAY OF TROPONIN QUANT: CPT | Performed by: NURSE PRACTITIONER

## 2024-11-25 PROCEDURE — 96376 TX/PRO/DX INJ SAME DRUG ADON: CPT

## 2024-11-25 PROCEDURE — G0378 HOSPITAL OBSERVATION PER HR: HCPCS

## 2024-11-25 PROCEDURE — 94640 AIRWAY INHALATION TREATMENT: CPT

## 2024-11-25 PROCEDURE — 83880 ASSAY OF NATRIURETIC PEPTIDE: CPT | Performed by: EMERGENCY MEDICINE

## 2024-11-25 PROCEDURE — 85025 COMPLETE CBC W/AUTO DIFF WBC: CPT | Performed by: EMERGENCY MEDICINE

## 2024-11-25 PROCEDURE — 99285 EMERGENCY DEPT VISIT HI MDM: CPT

## 2024-11-25 PROCEDURE — 85027 COMPLETE CBC AUTOMATED: CPT | Performed by: NURSE PRACTITIONER

## 2024-11-25 PROCEDURE — 96374 THER/PROPH/DIAG INJ IV PUSH: CPT

## 2024-11-25 PROCEDURE — 25010000002 METHYLPREDNISOLONE PER 125 MG: Performed by: NURSE PRACTITIONER

## 2024-11-25 PROCEDURE — 93010 ELECTROCARDIOGRAM REPORT: CPT | Performed by: STUDENT IN AN ORGANIZED HEALTH CARE EDUCATION/TRAINING PROGRAM

## 2024-11-25 PROCEDURE — 96372 THER/PROPH/DIAG INJ SC/IM: CPT

## 2024-11-25 PROCEDURE — 25010000002 ENOXAPARIN PER 10 MG: Performed by: INTERNAL MEDICINE

## 2024-11-25 PROCEDURE — 25010000002 METHYLPREDNISOLONE PER 40 MG: Performed by: INTERNAL MEDICINE

## 2024-11-25 PROCEDURE — 25010000002 METHYLPREDNISOLONE PER 125 MG: Performed by: EMERGENCY MEDICINE

## 2024-11-25 PROCEDURE — 84145 PROCALCITONIN (PCT): CPT | Performed by: EMERGENCY MEDICINE

## 2024-11-25 PROCEDURE — 84484 ASSAY OF TROPONIN QUANT: CPT | Performed by: EMERGENCY MEDICINE

## 2024-11-25 PROCEDURE — 71045 X-RAY EXAM CHEST 1 VIEW: CPT

## 2024-11-25 PROCEDURE — 93005 ELECTROCARDIOGRAM TRACING: CPT | Performed by: EMERGENCY MEDICINE

## 2024-11-25 PROCEDURE — 94761 N-INVAS EAR/PLS OXIMETRY MLT: CPT

## 2024-11-25 PROCEDURE — 0202U NFCT DS 22 TRGT SARS-COV-2: CPT | Performed by: INTERNAL MEDICINE

## 2024-11-25 PROCEDURE — 92610 EVALUATE SWALLOWING FUNCTION: CPT

## 2024-11-25 PROCEDURE — 36415 COLL VENOUS BLD VENIPUNCTURE: CPT | Performed by: NURSE PRACTITIONER

## 2024-11-25 PROCEDURE — 94664 DEMO&/EVAL PT USE INHALER: CPT

## 2024-11-25 RX ORDER — ACETAMINOPHEN 325 MG/1
650 TABLET ORAL EVERY 4 HOURS PRN
Status: DISCONTINUED | OUTPATIENT
Start: 2024-11-25 | End: 2024-11-27 | Stop reason: HOSPADM

## 2024-11-25 RX ORDER — BISACODYL 5 MG/1
5 TABLET, DELAYED RELEASE ORAL DAILY PRN
Status: DISCONTINUED | OUTPATIENT
Start: 2024-11-25 | End: 2024-11-27 | Stop reason: HOSPADM

## 2024-11-25 RX ORDER — SODIUM CHLORIDE 0.9 % (FLUSH) 0.9 %
10 SYRINGE (ML) INJECTION AS NEEDED
Status: DISCONTINUED | OUTPATIENT
Start: 2024-11-25 | End: 2024-11-27 | Stop reason: HOSPADM

## 2024-11-25 RX ORDER — GABAPENTIN 300 MG/1
300 CAPSULE ORAL 3 TIMES DAILY
Status: DISCONTINUED | OUTPATIENT
Start: 2024-11-25 | End: 2024-11-27 | Stop reason: HOSPADM

## 2024-11-25 RX ORDER — ACETAMINOPHEN 160 MG/5ML
650 SOLUTION ORAL EVERY 4 HOURS PRN
Status: DISCONTINUED | OUTPATIENT
Start: 2024-11-25 | End: 2024-11-27 | Stop reason: HOSPADM

## 2024-11-25 RX ORDER — ENOXAPARIN SODIUM 100 MG/ML
40 INJECTION SUBCUTANEOUS EVERY 24 HOURS
Status: DISCONTINUED | OUTPATIENT
Start: 2024-11-25 | End: 2024-11-27 | Stop reason: HOSPADM

## 2024-11-25 RX ORDER — BUDESONIDE 0.5 MG/2ML
0.5 INHALANT ORAL
Status: DISCONTINUED | OUTPATIENT
Start: 2024-11-25 | End: 2024-11-27 | Stop reason: HOSPADM

## 2024-11-25 RX ORDER — BISACODYL 10 MG
10 SUPPOSITORY, RECTAL RECTAL DAILY PRN
Status: DISCONTINUED | OUTPATIENT
Start: 2024-11-25 | End: 2024-11-27 | Stop reason: HOSPADM

## 2024-11-25 RX ORDER — SODIUM CHLORIDE FOR INHALATION 7 %
4 VIAL, NEBULIZER (ML) INHALATION
Status: DISCONTINUED | OUTPATIENT
Start: 2024-11-25 | End: 2024-11-27 | Stop reason: HOSPADM

## 2024-11-25 RX ORDER — DICYCLOMINE HYDROCHLORIDE 10 MG/1
10 CAPSULE ORAL 3 TIMES DAILY PRN
Status: DISCONTINUED | OUTPATIENT
Start: 2024-11-25 | End: 2024-11-27 | Stop reason: HOSPADM

## 2024-11-25 RX ORDER — METHYLPREDNISOLONE SODIUM SUCCINATE 125 MG/2ML
125 INJECTION, POWDER, LYOPHILIZED, FOR SOLUTION INTRAMUSCULAR; INTRAVENOUS ONCE
Status: COMPLETED | OUTPATIENT
Start: 2024-11-25 | End: 2024-11-25

## 2024-11-25 RX ORDER — AMOXICILLIN 250 MG
2 CAPSULE ORAL 2 TIMES DAILY PRN
Status: DISCONTINUED | OUTPATIENT
Start: 2024-11-25 | End: 2024-11-27 | Stop reason: HOSPADM

## 2024-11-25 RX ORDER — CALCIUM CARBONATE 500 MG/1
2 TABLET, CHEWABLE ORAL 2 TIMES DAILY PRN
Status: DISCONTINUED | OUTPATIENT
Start: 2024-11-25 | End: 2024-11-27 | Stop reason: HOSPADM

## 2024-11-25 RX ORDER — IPRATROPIUM BROMIDE AND ALBUTEROL SULFATE 2.5; .5 MG/3ML; MG/3ML
3 SOLUTION RESPIRATORY (INHALATION) EVERY 4 HOURS PRN
Status: DISCONTINUED | OUTPATIENT
Start: 2024-11-25 | End: 2024-11-25

## 2024-11-25 RX ORDER — POLYETHYLENE GLYCOL 3350 17 G/17G
17 POWDER, FOR SOLUTION ORAL DAILY PRN
Status: DISCONTINUED | OUTPATIENT
Start: 2024-11-25 | End: 2024-11-27 | Stop reason: HOSPADM

## 2024-11-25 RX ORDER — ARFORMOTEROL TARTRATE 15 UG/2ML
15 SOLUTION RESPIRATORY (INHALATION)
Status: DISCONTINUED | OUTPATIENT
Start: 2024-11-25 | End: 2024-11-27 | Stop reason: HOSPADM

## 2024-11-25 RX ORDER — IPRATROPIUM BROMIDE AND ALBUTEROL SULFATE 2.5; .5 MG/3ML; MG/3ML
3 SOLUTION RESPIRATORY (INHALATION)
Status: DISCONTINUED | OUTPATIENT
Start: 2024-11-25 | End: 2024-11-25

## 2024-11-25 RX ORDER — ONDANSETRON 4 MG/1
4 TABLET, ORALLY DISINTEGRATING ORAL EVERY 6 HOURS PRN
Status: DISCONTINUED | OUTPATIENT
Start: 2024-11-25 | End: 2024-11-27 | Stop reason: HOSPADM

## 2024-11-25 RX ORDER — ONDANSETRON 2 MG/ML
4 INJECTION INTRAMUSCULAR; INTRAVENOUS EVERY 6 HOURS PRN
Status: DISCONTINUED | OUTPATIENT
Start: 2024-11-25 | End: 2024-11-27 | Stop reason: HOSPADM

## 2024-11-25 RX ORDER — SODIUM CHLORIDE 9 MG/ML
40 INJECTION, SOLUTION INTRAVENOUS AS NEEDED
Status: DISCONTINUED | OUTPATIENT
Start: 2024-11-25 | End: 2024-11-27 | Stop reason: HOSPADM

## 2024-11-25 RX ORDER — FUROSEMIDE 20 MG/1
20 TABLET ORAL 2 TIMES DAILY
COMMUNITY

## 2024-11-25 RX ORDER — SUCRALFATE 1 G/1
1 TABLET ORAL
Status: DISCONTINUED | OUTPATIENT
Start: 2024-11-25 | End: 2024-11-27 | Stop reason: HOSPADM

## 2024-11-25 RX ORDER — FUROSEMIDE 20 MG/1
20 TABLET ORAL
Status: DISCONTINUED | OUTPATIENT
Start: 2024-11-25 | End: 2024-11-27 | Stop reason: HOSPADM

## 2024-11-25 RX ORDER — METHYLPREDNISOLONE SODIUM SUCCINATE 40 MG/ML
40 INJECTION, POWDER, LYOPHILIZED, FOR SOLUTION INTRAMUSCULAR; INTRAVENOUS EVERY 12 HOURS
Status: DISCONTINUED | OUTPATIENT
Start: 2024-11-25 | End: 2024-11-26

## 2024-11-25 RX ORDER — METHYLPREDNISOLONE SODIUM SUCCINATE 125 MG/2ML
60 INJECTION, POWDER, LYOPHILIZED, FOR SOLUTION INTRAMUSCULAR; INTRAVENOUS EVERY 12 HOURS
Status: DISCONTINUED | OUTPATIENT
Start: 2024-11-25 | End: 2024-11-25

## 2024-11-25 RX ORDER — ALBUTEROL SULFATE 0.83 MG/ML
2.5 SOLUTION RESPIRATORY (INHALATION) EVERY 6 HOURS PRN
Status: DISCONTINUED | OUTPATIENT
Start: 2024-11-25 | End: 2024-11-27 | Stop reason: HOSPADM

## 2024-11-25 RX ORDER — GUAIFENESIN 600 MG/1
1200 TABLET, EXTENDED RELEASE ORAL 2 TIMES DAILY
Status: DISCONTINUED | OUTPATIENT
Start: 2024-11-25 | End: 2024-11-27 | Stop reason: HOSPADM

## 2024-11-25 RX ORDER — ACETAMINOPHEN 650 MG/1
650 SUPPOSITORY RECTAL EVERY 4 HOURS PRN
Status: DISCONTINUED | OUTPATIENT
Start: 2024-11-25 | End: 2024-11-27 | Stop reason: HOSPADM

## 2024-11-25 RX ORDER — SODIUM CHLORIDE 0.9 % (FLUSH) 0.9 %
10 SYRINGE (ML) INJECTION EVERY 12 HOURS SCHEDULED
Status: DISCONTINUED | OUTPATIENT
Start: 2024-11-25 | End: 2024-11-27 | Stop reason: HOSPADM

## 2024-11-25 RX ORDER — PANTOPRAZOLE SODIUM 40 MG/1
40 TABLET, DELAYED RELEASE ORAL DAILY
Status: DISCONTINUED | OUTPATIENT
Start: 2024-11-25 | End: 2024-11-27 | Stop reason: HOSPADM

## 2024-11-25 RX ADMIN — FUROSEMIDE 20 MG: 20 TABLET ORAL at 18:28

## 2024-11-25 RX ADMIN — IPRATROPIUM BROMIDE AND ALBUTEROL SULFATE 3 ML: 2.5; .5 SOLUTION RESPIRATORY (INHALATION) at 15:32

## 2024-11-25 RX ADMIN — IPRATROPIUM BROMIDE AND ALBUTEROL SULFATE 3 ML: 2.5; .5 SOLUTION RESPIRATORY (INHALATION) at 11:24

## 2024-11-25 RX ADMIN — GABAPENTIN 300 MG: 300 CAPSULE ORAL at 15:50

## 2024-11-25 RX ADMIN — Medication 4 ML: at 20:59

## 2024-11-25 RX ADMIN — SUCRALFATE 1 G: 1 TABLET ORAL at 20:16

## 2024-11-25 RX ADMIN — METHYLPREDNISOLONE SODIUM SUCCINATE 40 MG: 40 INJECTION, POWDER, FOR SOLUTION INTRAMUSCULAR; INTRAVENOUS at 20:16

## 2024-11-25 RX ADMIN — Medication 10 ML: at 20:19

## 2024-11-25 RX ADMIN — SUCRALFATE 1 G: 1 TABLET ORAL at 12:23

## 2024-11-25 RX ADMIN — GUAIFENESIN 1200 MG: 600 TABLET, EXTENDED RELEASE ORAL at 12:23

## 2024-11-25 RX ADMIN — GABAPENTIN 300 MG: 300 CAPSULE ORAL at 20:16

## 2024-11-25 RX ADMIN — FLUOXETINE HYDROCHLORIDE 20 MG: 20 CAPSULE ORAL at 12:23

## 2024-11-25 RX ADMIN — PANTOPRAZOLE SODIUM 40 MG: 40 TABLET, DELAYED RELEASE ORAL at 12:23

## 2024-11-25 RX ADMIN — ARFORMOTEROL TARTRATE 15 MCG: 15 SOLUTION RESPIRATORY (INHALATION) at 20:50

## 2024-11-25 RX ADMIN — SUCRALFATE 1 G: 1 TABLET ORAL at 18:28

## 2024-11-25 RX ADMIN — IPRATROPIUM BROMIDE AND ALBUTEROL SULFATE 3 ML: 2.5; .5 SOLUTION RESPIRATORY (INHALATION) at 08:43

## 2024-11-25 RX ADMIN — METHYLPREDNISOLONE SODIUM SUCCINATE 125 MG: 125 INJECTION INTRAMUSCULAR; INTRAVENOUS at 01:05

## 2024-11-25 RX ADMIN — Medication 10 ML: at 10:00

## 2024-11-25 RX ADMIN — BUDESONIDE 0.5 MG: 0.5 INHALANT RESPIRATORY (INHALATION) at 20:50

## 2024-11-25 RX ADMIN — GUAIFENESIN 1200 MG: 600 TABLET, EXTENDED RELEASE ORAL at 20:16

## 2024-11-25 RX ADMIN — METHYLPREDNISOLONE SODIUM SUCCINATE 60 MG: 125 INJECTION INTRAMUSCULAR; INTRAVENOUS at 10:01

## 2024-11-25 RX ADMIN — ENOXAPARIN SODIUM 40 MG: 100 INJECTION SUBCUTANEOUS at 12:22

## 2024-11-25 NOTE — CASE MANAGEMENT/SOCIAL WORK
Discharge Planning Assessment  [facility]     Patient Name: Tony Leonard  MRN: [id number]  Today's Date: 11/25/2024    Admit Date: 11/25/2024    Plan: Return to [facility]   Discharge Needs Assessment       Row Name 11/25/24 0856       Living Environment    People in Home facility resident    Current Living Arrangements residential facility    Primary Care Provided by self    Able to Return to Prior Arrangements yes       Resource/Environmental Concerns    Resource/Environmental Concerns none    Transportation Concerns none       Transition Planning    Patient/Family Anticipates Transition to long-term care facility    Patient/Family Anticipated Services at Transition none       Discharge Needs Assessment    Readmission Within the Last 30 Days no previous admission in last 30 days    Current Outpatient/Agency/Support Group skilled nursing facility    Equipment Currently Used at Home rollator;wheelchair    Concerns to be Addressed no discharge needs identified;denies needs/concerns at this time    Anticipated Changes Related to Illness none    Equipment Needed After Discharge none    Discharge Facility/Level of Care Needs nursing facility, intermediate                   Discharge Plan       Row Name 11/25/24 0856       Plan    Plan Return to [facility]    Plan Comments CCP met with patient at bedside. CCP role explained and discharge planning discussed. Face sheet verified and ROMERO noted. Patient resides at [facility]. Patient uses a walker or a wheelchair for mobility. Patient plans to return to [facility] at discharge. CCP spoke with [facility]; patient is [facility] Medicaid bed hold and can return. Will need [facility] transportation. CCP will follow and assist as needed. Paz HENDRICKS                   Continued Care and Services - Admitted Since 11/25/2024       Destination       Service Provider Request Status Services Address Phone Fax Patient Preferred    [facility] Pending - Request Sent -- [phone] [facility] [address] [phone] --                     Demographic Summary       Row Name 11/25/24 0854       General Information    Admission Type observation    Arrived From emergency department    Required Notices Provided Observation Status Notice    Referral Source admission list    Reason for Consult discharge planning    Preferred Language English                   Functional Status       Row Name 11/25/24 0855       Functional Status    Usual Activity Tolerance good    Current Activity Tolerance moderate       Functional Status, IADL    Medications assistive equipment    Meal Preparation assistive equipment    Housekeeping assistive equipment    Laundry assistive equipment    Shopping assistive equipment       Mental Status    General Appearance WDL WDL       Mental Status Summary    Recent Changes in Mental Status/Cognitive Functioning no changes                   Psychosocial    No documentation.                  Abuse/Neglect    No documentation.                  Legal    No documentation.                  Substance Abuse    No documentation.                  Patient Forms    No documentation.                     RUTHIE Cadet

## 2024-11-25 NOTE — ED PROVIDER NOTES
EMERGENCY DEPARTMENT ENCOUNTER  Room Number:  E454/1  PCP: Alfonso Goldstein MD  Independent Historians: Patient and EMS      HPI:  Chief Complaint: had concerns including Shortness of Breath.     A complete HPI/ROS/PMH/PSH/SH/FH are unobtainable due to: None    Chronic or social conditions impacting patient care (Social Determinants of Health): None      Context: Tony Leonard is a 84 y.o. male with a medical history of COPD, CHF, pulmonary embolism, prostate cancer and ADHD who presents to the ED c/o acute dyspnea that is worsening throughout the day.  Patient presents via EMS from the [facility] [place] [facility].  He had progressive worsening dyspnea and wheezing through the evening hours tonight.  He says the room felt very hot where he was at he felt like his breathing was becoming more labored.  He did not have any chest pain though.  He did not have any fevers.  He has had some mild nonproductive coughing.  Denies any abdominal pain or vomiting or diarrhea.  He wears nasal cannula oxygen primarily at nighttime only.  When paramedics arrived, they found him to have some signs of accessory muscle use and audible wheezing.  They gave him 1 DuoNeb therapy in route.  Patient says that did provide quite a lot of relief and now his breathing is beginning to feel better again.      Review of prior external notes (non-ED) -and- Review of prior external test results outside of this encounter: I independently reviewed the hospitalist discharge summary from May 23, 2024.  Patient was admitted for management of pulmonary embolism at that time.  RV to LV ratio was less than 1.    Prescription drug monitoring program review: [facility] reviewed by Elisa Tony MD, Sergei Trevizo MD       PAST MEDICAL HISTORY  Active Ambulatory Problems     Diagnosis Date Noted    Thrush, oral 03/11/2016    ADD (attention deficit disorder) 03/11/2016    Hemorrhoids 03/11/2016    Bronchiectasis with acute lower respiratory  infection 03/11/2016    Diverticul disease small and large intestine, no perforati or abscess 03/11/2016    Benign non-nodular prostatic hyperplasia without lower urinary tract symptoms 03/11/2016    Gastroesophageal reflux disease 03/11/2016    Malaise and fatigue 03/11/2016    Environmental allergies 04/25/2016    Hemoptysis 04/27/2016    Dyslipidemia 05/11/2016    Primary osteoarthritis involving multiple joints 05/11/2016    Pneumonia of right lower lobe due to infectious organism 10/03/2016    Abnormal EKG 10/04/2016    COPD (chronic obstructive pulmonary disease) 10/04/2016    Mild malnutrition 03/28/2017    Acute on chronic respiratory failure with hypoxia 04/27/2017    Fracture, intertrochanteric, right femur, closed, initial encounter 01/16/2018    Chronic diastolic CHF (congestive heart failure) 01/16/2018    Hematoma of frontal scalp 01/16/2018    Postoperative anemia due to acute blood loss 01/19/2018    Urinary retention 01/25/2018    Hemorrhagic disorder due to circulating anticoagulants 01/29/2018    History of fracture of right hip 02/22/2018    Closed fracture of right hip with routine healing 02/28/2018    Chronic low back pain with sciatica 06/21/2018    Change in bowel function 04/18/2019    Rectal bleeding 04/18/2019    Prostate cancer 04/22/2019    On home oxygen therapy 09/24/2019    Acute viral bronchitis 12/29/2019    Peripheral neuropathy 07/01/2021    Plantar fasciitis 09/08/2021    HTN (hypertension) 05/06/2022    COPD exacerbation 05/07/2022    Bilateral lower extremity edema 05/07/2022    Microscopic hematuria 05/08/2022    Acute midline low back pain with right-sided sciatica 08/01/2022    Spinal stenosis, lumbar region with neurogenic claudication 08/02/2022    Compression deformity of vertebra 08/02/2022    Rectal bleed 09/23/2022    Esophagitis 09/24/2022    Angiectasia 09/27/2022    Hiatal hernia 09/27/2022    Overweight (BMI 25.0-29.9) 11/14/2022    Leukocytosis 11/15/2022     Bilateral hip pain 11/21/2022    Elevated troponin level not due myocardial infarction 12/10/2022    Nocturnal hypoxia 12/10/2022    Pneumonia of right upper lobe due to infectious organism 12/29/2022    NSTEMI (non-ST elevated myocardial infarction) 01/29/2023    Chronic respiratory failure with hypoxia 01/29/2023    Paroxysmal atrial fibrillation 02/17/2023    Acute exacerbation of chronic obstructive pulmonary disease (COPD) 05/10/2023    Anemia 05/10/2023    Non-compliant patient 05/11/2023    Hypokalemia 10/01/2020    Spondylolisthesis of lumbar region 08/14/2018    Elevated troponin 12/22/2023    Rhabdomyolysis 12/26/2023    Multiple contusions 12/26/2023    Fall 12/26/2023    Dizziness 04/09/2024    Contusion of hip 04/09/2024    Pulmonary embolism 05/21/2024     Resolved Ambulatory Problems     Diagnosis Date Noted    Pneumonia of both lower lobes due to infectious organism 03/11/2016    Pneumonia of right upper lobe due to infectious organism 04/22/2016    COPD exacerbation 04/25/2016    GERD (gastroesophageal reflux disease) 04/25/2016    Chronic respiratory failure with hypoxia 10/04/2016    Bacterial lobar pneumonia 12/27/2016    Pneumonia of left lower lobe due to infectious organism 03/26/2017    Bronchitis 06/01/2017    COPD exacerbation 06/17/2017    Leukocytosis 01/16/2018    Right upper lobe pneumonia 01/20/2018    Mucus plugging of bronchi 01/16/2018    COPD exacerbation 04/16/2018    Degenerative joint disease (DJD) of hip 09/23/2019    Bronchiectasis with (acute) exacerbation 12/28/2019    Septic shock 11/26/2022    Acute saddle pulmonary embolism without acute cor pulmonale  - 12/11/2022 12/11/2022    Chest pain 04/19/2024     Past Medical History:   Diagnosis Date    ADHD (attention deficit hyperactivity disorder)     Bronchiectasis     CHF (congestive heart failure)     Colon polyp     Diverticulosis     Hard of hearing     Pneumonia          PAST SURGICAL HISTORY  Past Surgical History:    Procedure Laterality Date    BRONCHOSCOPY N/A 04/30/2016    Procedure: BRONCHOSCOPY with BAL of right lower lobe and left  lower lobe.  ;  Surgeon: Nando Diaz MD;  Location: [facility] ENDOSCOPY;  Service:     BRONCHOSCOPY N/A 04/28/2017    Procedure: BRONCHOSCOPY;  Surgeon: Katharina Salter MD;  [facility];  Service:     BRONCHOSCOPY Bilateral 06/29/2017    Procedure: BRONCHOSCOPY WITH WASHINGS;  Surgeon: Katharina Salter MD;  [facility];  Service:     BRONCHOSCOPY N/A 01/22/2018    Procedure: BRONCHOSCOPY AT BEDSIDE with BAL;  Surgeon: Katharina Salter MD;  [facility];  Service:     BRONCHOSCOPY N/A 01/30/2018    Procedure: BRONCHOSCOPY with BAL and washing;  Surgeon: Shreyas Wild MD;  Location: [facility] ENDOSCOPY;  Service:     BRONCHOSCOPY Bilateral 04/24/2018    Procedure: BRONCHOSCOPY WITH WASHING;  Surgeon: Katharina Salter MD;  Location: [facility] ENDOSCOPY;  Service: Pulmonary    BRONCHOSCOPY N/A 12/28/2019    Procedure: BRONCHOSCOPY with bilateral washing;  Surgeon: Katharina Salter MD;  Location: [facility] ENDOSCOPY;  Service: Pulmonary    BRONCHOSCOPY Bilateral 01/04/2023    Procedure: BRONCHOSCOPY with lavage;  Surgeon: Katharina Salter MD;  Location: [facility] ENDOSCOPY;  Service: Pulmonary;  Laterality: Bilateral;  mucus plugging    CARDIAC CATHETERIZATION N/A 01/29/2023    Procedure: Left Heart Cath;  Surgeon: Johnnie Ang MD;  Location: [facility] CATH INVASIVE LOCATION;  Service: Cardiology;  Laterality: N/A;    CARDIAC CATHETERIZATION N/A 01/29/2023    Procedure: Coronary angiography;  Surgeon: Johnnie Ang MD;  Location: [facility] CATH INVASIVE LOCATION;  Service: Cardiology;  Laterality: N/A;    COLONOSCOPY  05/17/2013    eh, ih, tort, sig tics    COLONOSCOPY N/A 05/10/2019    Non-thrombosed external hemorrhoids found on perianal exam, Diverticulosis, Tortuous colon, One 5 mm polyp in the mid ascending colon, IH. Path: Tubular adenoma.      COLONOSCOPY N/A 09/24/2022    Procedure: COLONOSCOPY to cecum and TI with argon plasma coagulation.;  Surgeon: Bruce Black MD;  Location: [facility] ENDOSCOPY;  Service: Gastroenterology;  Laterality: N/A;  pre- GI bleeding  post- radiation proctitis, diverticulosis    ENDOSCOPY  03/19/2015    z line irreg, hh    ENDOSCOPY N/A 09/24/2022    Procedure: ESOPHAGOGASTRODUODENOSCOPY;  Surgeon: Bruce Black MD;  [facility];  Service: Gastroenterology;  Laterality: N/A;  pre- GI bleeding  post- esophagitis, hiatal hernia    HIP OPEN REDUCTION Right 01/17/2018    Procedure: HIP OPEN REDUCTION INTERNAL FIXATION WITH DYNAMIC HIP SCREW;  Surgeon: Les Black MD;  Location: [facility] MAIN OR;  Service:     SPINE SURGERY      TONSILLECTOMY      TOTAL HIP ARTHROPLASTY REVISION Right 09/23/2019    Procedure: HIP  REVISION RIGHT;  Surgeon: Isauro Warner MD;  Location: [facility] MAIN OR;  Service: Orthopedics         FAMILY HISTORY  Family History   Problem Relation Age of Onset    Thyroid disease Mother     No Known Problems Father     Malig Hyperthermia Neg Hx          SOCIAL HISTORY  Social History     Socioeconomic History    Marital status: Single   Tobacco Use    Smoking status: Never    Smokeless tobacco: Never   Vaping Use    Vaping status: Never Used   Substance and Sexual Activity    Alcohol use: No     Comment: red wine occassional    Drug use: No    Sexual activity: Defer         ALLERGIES  Daliresp [roflumilast], Latex, and Sulfa antibiotics      REVIEW OF SYSTEMS  Review of Systems  Included in HPI  All systems reviewed and negative except for those discussed in HPI.      PHYSICAL EXAM    I have reviewed the triage vital signs and nursing notes.    ED Triage Vitals [11/25/24 0022]   Temp Heart Rate Resp BP SpO2   97.4 °F (36.3 °C) 101 18 100/62 99 %      Temp src Heart Rate Source Patient Position BP Location FiO2 (%)   -- -- -- -- --       Physical Exam  GENERAL: alert, no acute distress  SKIN:  Warm, dry, no rashes  HENT: Normocephalic, atraumatic, moist mucous membranes  EYES: no scleral icterus, normal conjunctivae  CV: regular rhythm, regular rate, normal perfusion  RESPIRATORY: Mild accessory muscle use is noted in conversation.  Breath sounds are diminished bilaterally.  There are scattered wheezes bilaterally also.  No coughing during my eval.  No stridor.  ABDOMEN: soft, nondistended, nontender  MUSCULOSKELETAL: no deformity, no edema or asymmetry of extremities  NEURO: alert, moves all extremities, follows commands      LAB RESULTS  Recent Results (from the past 24 hours)   Comprehensive Metabolic Panel    Collection Time: 11/25/24 12:43 AM    Specimen: Blood   Result Value Ref Range    Glucose 107 (H) 65 - 99 mg/dL    BUN 15 8 - 23 mg/dL    Creatinine 1.03 0.76 - 1.27 mg/dL    Sodium 138 136 - 145 mmol/L    Potassium 4.5 3.5 - 5.2 mmol/L    Chloride 102 98 - 107 mmol/L    CO2 25.8 22.0 - 29.0 mmol/L    Calcium 8.7 8.6 - 10.5 mg/dL    Total Protein 6.2 6.0 - 8.5 g/dL    Albumin 3.4 (L) 3.5 - 5.2 g/dL    ALT (SGPT) 11 1 - 41 U/L    AST (SGOT) 31 1 - 40 U/L    Alkaline Phosphatase 71 39 - 117 U/L    Total Bilirubin 0.4 0.0 - 1.2 mg/dL    Globulin 2.8 gm/dL    A/G Ratio 1.2 g/dL    BUN/Creatinine Ratio 14.6 7.0 - 25.0    Anion Gap 10.2 5.0 - 15.0 mmol/L    eGFR 70.7 >60.0 mL/min/1.73   BNP    Collection Time: 11/25/24 12:43 AM    Specimen: Blood   Result Value Ref Range    proBNP 410.0 0.0 - 1,800.0 pg/mL   Procalcitonin    Collection Time: 11/25/24 12:43 AM    Specimen: Blood   Result Value Ref Range    Procalcitonin 0.06 0.00 - 0.25 ng/mL   CBC Auto Differential    Collection Time: 11/25/24 12:43 AM    Specimen: Blood   Result Value Ref Range    WBC 7.55 3.40 - 10.80 10*3/mm3    RBC 3.76 (L) 4.14 - 5.80 10*6/mm3    Hemoglobin 9.1 (L) 13.0 - 17.7 g/dL    Hematocrit 29.1 (L) 37.5 - 51.0 %    MCV 77.4 (L) 79.0 - 97.0 fL    MCH 24.2 (L) 26.6 - 33.0 pg    MCHC 31.3 (L) 31.5 - 35.7 g/dL    RDW 16.3 (H)  12.3 - 15.4 %    RDW-SD 45.6 37.0 - 54.0 fl    MPV 9.1 6.0 - 12.0 fL    Platelets 305 140 - 450 10*3/mm3    Neutrophil % 61.1 42.7 - 76.0 %    Lymphocyte % 29.4 19.6 - 45.3 %    Monocyte % 9.1 5.0 - 12.0 %    Eosinophil % 0.0 (L) 0.3 - 6.2 %    Basophil % 0.1 0.0 - 1.5 %    Immature Grans % 0.3 0.0 - 0.5 %    Neutrophils, Absolute 4.61 1.70 - 7.00 10*3/mm3    Lymphocytes, Absolute 2.22 0.70 - 3.10 10*3/mm3    Monocytes, Absolute 0.69 0.10 - 0.90 10*3/mm3    Eosinophils, Absolute 0.00 0.00 - 0.40 10*3/mm3    Basophils, Absolute 0.01 0.00 - 0.20 10*3/mm3    Immature Grans, Absolute 0.02 0.00 - 0.05 10*3/mm3    nRBC 0.0 0.0 - 0.2 /100 WBC   ECG 12 Lead Dyspnea    Collection Time: 11/25/24 12:55 AM   Result Value Ref Range    QT Interval 351 ms    QTC Interval 446 ms   Single High Sensitivity Troponin T    Collection Time: 11/25/24  1:36 AM    Specimen: Blood   Result Value Ref Range    HS Troponin T 38 (H) <22 ng/L         RADIOLOGY  XR Chest 1 View    Result Date: 11/25/2024  SINGLE VIEW OF THE CHEST  HISTORY: Dyspnea  COMPARISON: April 19, 2024  FINDINGS: There is cardiomegaly. There is vascular congestion. There is calcification of the aorta. There is a small left pleural effusion. No pneumothorax is seen. Lung volumes remain diminished. There are background changes of COPD.      Cardiomegaly with suspected vascular congestion.  This report was finalized on 11/25/2024 1:22 AM by Dr. Danielle Arango M.D on Workstation: [id number]         MEDICATIONS GIVEN IN ER  Medications   sodium chloride 0.9 % flush 10 mL (has no administration in time range)   sodium chloride 0.9 % flush 10 mL (has no administration in time range)   sodium chloride 0.9 % flush 10 mL (has no administration in time range)   sodium chloride 0.9 % infusion 40 mL (has no administration in time range)   acetaminophen (TYLENOL) tablet 650 mg (has no administration in time range)     Or   acetaminophen (TYLENOL) 160 MG/5ML oral solution 650 mg (has  no administration in time range)     Or   acetaminophen (TYLENOL) suppository 650 mg (has no administration in time range)   sennosides-docusate (PERICOLACE) 8.6-50 MG per tablet 2 tablet (has no administration in time range)     And   polyethylene glycol (MIRALAX) packet 17 g (has no administration in time range)     And   bisacodyl (DULCOLAX) EC tablet 5 mg (has no administration in time range)     And   bisacodyl (DULCOLAX) suppository 10 mg (has no administration in time range)   ondansetron ODT (ZOFRAN-ODT) disintegrating tablet 4 mg (has no administration in time range)     Or   ondansetron (ZOFRAN) injection 4 mg (has no administration in time range)   calcium carbonate (TUMS) chewable tablet 500 mg (200 mg elemental) (has no administration in time range)   methylPREDNISolone sodium succinate (SOLU-Medrol) injection 60 mg (has no administration in time range)   ipratropium-albuterol (DUO-NEB) nebulizer solution 3 mL (has no administration in time range)   ipratropium-albuterol (DUO-NEB) nebulizer solution 3 mL (has no administration in time range)   methylPREDNISolone sodium succinate (SOLU-Medrol) injection 125 mg (125 mg Intravenous Given 11/25/24 0105)         ORDERS PLACED DURING THIS VISIT:  Orders Placed This Encounter   Procedures    XR Chest 1 View    Comprehensive Metabolic Panel    Single High Sensitivity Troponin T    BNP    Procalcitonin    CBC Auto Differential    Basic Metabolic Panel    CBC (No Diff)    High Sensitivity Troponin T    Diet: Cardiac; Healthy Heart (2-3 Na+); Fluid Consistency: Thin (IDDSI 0)    Monitor Blood Pressure    Continuous Pulse Oximetry    Vital Signs    Intake & Output    Weigh Patient    Oral Care    Saline Lock & Maintain IV Access    Place Sequential Compression Device    Maintain Sequential Compression Device    Reason for COPD Admission: New Oxygen Requirements; Indicate COPD Diagnosis For Problem List: COPD exacerbation    Tobacco Cessation Education     Respiratory Treatment Education (MDI / Spacer / Nebulizer)    COPD Education    Cough / Deep Breathe    Code Status and Medical Interventions: CPR (Attempt to Resuscitate); Full Support    LHA (on-call MD unless specified) Details    Inpatient Spiritual Care Consult    Inpatient Case Management  Consult    Incentive Spirometry    Document Pulse Oximetry - On Room Air / Home O2 Level    Oscillating Positive Expiratory Pressure (OPEP)    ECG 12 Lead Dyspnea    Telemetry Scan    Telemetry Scan    Insert Peripheral IV    Insert Peripheral IV    Initiate Observation Status    CBC & Differential         OUTPATIENT MEDICATION MANAGEMENT:  Current Facility-Administered Medications Ordered in Epic   Medication Dose Route Frequency Provider Last Rate Last Admin    acetaminophen (TYLENOL) tablet 650 mg  650 mg Oral Q4H PRN Caren Mead APRN        Or    acetaminophen (TYLENOL) 160 MG/5ML oral solution 650 mg  650 mg Oral Q4H PRN Caren Mead APRN        Or    acetaminophen (TYLENOL) suppository 650 mg  650 mg Rectal Q4H PRN Caren Mead APRN        sennosides-docusate (PERICOLACE) 8.6-50 MG per tablet 2 tablet  2 tablet Oral BID PRN Caren Mead APRN        And    polyethylene glycol (MIRALAX) packet 17 g  17 g Oral Daily PRN Caren Mead APRN        And    bisacodyl (DULCOLAX) EC tablet 5 mg  5 mg Oral Daily PRN Caren Mead APRN        And    bisacodyl (DULCOLAX) suppository 10 mg  10 mg Rectal Daily PRN Caren Mead APRN        calcium carbonate (TUMS) chewable tablet 500 mg (200 mg elemental)  2 tablet Oral BID PRN Caren Mead APRN        ipratropium-albuterol (DUO-NEB) nebulizer solution 3 mL  3 mL Nebulization Q6H While Awake - RT Caren Mead APRN        ipratropium-albuterol (DUO-NEB) nebulizer solution 3 mL  3 mL Nebulization Q4H PRN Caren Mead APRN        methylPREDNISolone sodium succinate (SOLU-Medrol)  injection 60 mg  60 mg Intravenous Q12H Caren Mead APRN        ondansetron ODT (ZOFRAN-ODT) disintegrating tablet 4 mg  4 mg Oral Q6H PRN Caren Mead APRN        Or    ondansetron (ZOFRAN) injection 4 mg  4 mg Intravenous Q6H PRN Caren Mead APRN        sodium chloride 0.9 % flush 10 mL  10 mL Intravenous PRN Sergei Trevizo MD        sodium chloride 0.9 % flush 10 mL  10 mL Intravenous Q12H Caren Mead APRN        sodium chloride 0.9 % flush 10 mL  10 mL Intravenous PRN Caren Mead APRN        sodium chloride 0.9 % infusion 40 mL  40 mL Intravenous PRN Caren Mead APRN         No current [facility]-ordered outpatient medications on file.         PROCEDURES  Procedures        PROGRESS, DATA ANALYSIS, CONSULTS, AND MEDICAL DECISION MAKING  All labs have been independently interpreted by me.  All radiology studies have been reviewed by me. All EKG's have been independently viewed and interpreted by me.  Discussion below represents my analysis of pertinent findings related to patient's condition, differential diagnosis, treatment plan and final disposition.    Differential diagnosis includes but is not limited to COPD exacerbation, CHF exacerbation, pneumothorax, pulmonary embolism, URI.    Clinical Scores:                   ED Course as of 11/25/24 0417   Mon Nov 25, 2024   0120 EKG         EKG time/Interp time: 1255/1257  Rhythm/Rate: Sinus rhythm, 97 bpm  P waves and NE: Present, 168 ms  QRS, axis: 84 ms, borderline left axis deviation  ST and T waves: No ST segment elevations are present.  Independently interpreted by me contemporaneously with treatment   [ANITA]   0159 I independently interpreted the chest x-ray and my findings are: Low lung volumes, cardiomegaly, central vascular congestion.  No pneumothorax [ANITA]   0200 Hemoglobin(!): 9.1 [ANITA]   0200 Hematocrit(!): 29.1 [ANITA]   0200 proBNP: 410.0 [ANITA]   0200 Procalcitonin: 0.06 [ANITA]   0207 I reassessed the  patient.  He is resting comfortably.  Work of breathing is normal.  He still has some scattered wheezes bilaterally and oxygenation is 91% on nasal cannula.  The proBNP test is normal range.  There is some vascular congestion noted on chest x-ray but I do not see effusions or extensive pulmonary edema.  I think the primary problem for this acute dyspnea episode tonight is COPD exacerbation.  I have given some IV steroids and I think that will continue to provide some added benefit through the night.  Given his advanced age and comorbidities I think it is wise to keep him in the hospital for repeat of observation and further supportive care with DuoNeb treatments.  He is agreeable with that plan. [ANITA]   0208 HS Troponin T(!): 38  Troponin is lower than typical baseline.  I do not suspect acute MI or ACS [ANITA]   0215 I discussed with Caren Mead from [facility] about the patient.  She agrees to admit him to the hospitalist service on behalf of of Dr. Tony [ANITA]      ED Course User Index  [ANITA] Sergei Trevizo MD         AS OF 04:17 EST VITALS:    BP - 102/59  HR - 87  TEMP - 98.8 °F (37.1 °C) (Oral)  O2 SATS - 94%    COMPLEXITY OF CARE  The patient requires admission.      DIAGNOSIS  Final diagnoses:   COPD with acute exacerbation   Chronic anemia         DISPOSITION  ED Disposition       ED Disposition   Decision to Admit    Condition   --    Comment   Level of Care: Telemetry [5]   Diagnosis: COPD with acute exacerbation [333654]   Admitting Physician: SALVADOR TONY [[id number]]   Attending Physician: SALVADOR TONY [[id number]]   Is patient appropriate for Inpatient Observation Unit?: Yes [1]                  Please note that portions of this document were completed with a voice recognition program.    Note Disclaimer: At [facility], we believe that sharing information builds trust and better relationships. You are receiving this note because you recently visited [facility]. It is possible you will see  health information before a provider has talked with you about it. This kind of information can be easy to misunderstand. To help you fully understand what it means for your health, we urge you to discuss this note with your provider.         Sergei Trevizo MD  11/25/24 0412

## 2024-11-25 NOTE — ED NOTES
"Nursing report ED to floor  Tony Leonard  84 y.o.  male    HPI :  HPI  Stated Reason for Visit: patient to ED via [facility]. Patient has been feeling SOA since this afternoon. Patient has hx of COPD and wears 2L at baseline. Wheezing and rhonchi ausculated per EMS.  History Obtained From: EMS    Chief Complaint  Chief Complaint   Patient presents with    Shortness of Breath       Admitting doctor:   Elisa Tony MD    Admitting diagnosis:   The encounter diagnosis was COPD with acute exacerbation.    Code status:   Current Code Status       Date Active Code Status Order ID Comments User Context       11/25/2024 0218 CPR (Attempt to Resuscitate) [id number]  Caren Mead APRN ED        Question Answer    Code Status (Patient has no pulse and is not breathing) CPR (Attempt to Resuscitate)    Medical Interventions (Patient has pulse or is breathing) Full Support                    Allergies:   Daliresp [roflumilast], Latex, and Sulfa antibiotics    Isolation:   No active isolations    Intake and Output  No intake or output data in the 24 hours ending 11/25/24 0240    Weight:       11/25/24 0146   Weight: 90.7 kg (200 lb)       Most recent vitals:   Vitals:    11/25/24 0109 11/25/24 0111 11/25/24 0116 11/25/24 0146   BP:  111/56 116/56 114/51   Pulse: 94 95 95 89   Resp:    18   Temp:       SpO2: 90% 90% 91% 91%   Weight:    90.7 kg (200 lb)   Height:    180.3 cm (71\")       Active LDAs/IV Access:   Lines, Drains & Airways       Active LDAs       Name Placement date Placement time Site Days    Peripheral IV 11/25/24 0019 Right Antecubital 11/25/24  0019  Antecubital  less than 1                    Labs (abnormal labs have a star):   Labs Reviewed   COMPREHENSIVE METABOLIC PANEL - Abnormal; Notable for the following components:       Result Value    Glucose 107 (*)     Albumin 3.4 (*)     All other components within normal limits    Narrative:     GFR Normal >60  Chronic Kidney Disease " <60  Kidney Failure <15    The GFR formula is only valid for adults with stable renal function between ages 18 and 70.   SINGLE HS TROPONIN T - Abnormal; Notable for the following components:    HS Troponin T 38 (*)     All other components within normal limits    Narrative:     High Sensitive Troponin T Reference Range:  <14.0 ng/L- Negative Female for AMI  <22.0 ng/L- Negative Male for AMI  >=14 - Abnormal Female indicating possible myocardial injury.  >=22 - Abnormal Male indicating possible myocardial injury.   Clinicians would have to utilize clinical acumen, EKG, Troponin, and serial changes to determine if it is an Acute Myocardial Infarction or myocardial injury due to an underlying chronic condition.        CBC WITH AUTO DIFFERENTIAL - Abnormal; Notable for the following components:    RBC 3.76 (*)     Hemoglobin 9.1 (*)     Hematocrit 29.1 (*)     MCV 77.4 (*)     MCH 24.2 (*)     MCHC 31.3 (*)     RDW 16.3 (*)     Eosinophil % 0.0 (*)     All other components within normal limits   BNP (IN-HOUSE) - Normal    Narrative:     This assay is used as an aid in the diagnosis of individuals suspected of having heart failure. It can be used as an aid in the diagnosis of acute decompensated heart failure (ADHF) in patients presenting with signs and symptoms of ADHF to the emergency department (ED). In addition, NT-proBNP of <300 pg/mL indicates ADHF is not likely.    Age Range Result Interpretation  NT-proBNP Concentration (pg/mL:      <50             Positive            >450                   Gray                 300-450                    Negative             <300    50-75           Positive            >900                  Gray                300-900                  Negative            <300      >75             Positive            >1800                  Gray                300-1800                  Negative            <300   PROCALCITONIN - Normal    Narrative:     As a Marker for Sepsis (Non-Neonates):    1.  "<0.5 ng/mL represents a low risk of severe sepsis and/or septic shock.  2. >2 ng/mL represents a high risk of severe sepsis and/or septic shock.    As a Marker for Lower Respiratory Tract Infections that require antibiotic therapy:    PCT on Admission    Antibiotic Therapy       6-12 Hrs later    >0.5                Strongly Recommended  >0.25 - <0.5        Recommended   0.1 - 0.25          Discouraged              Remeasure/reassess PCT  <0.1                Strongly Discouraged     Remeasure/reassess PCT    As 28 day mortality risk marker: \"Change in Procalcitonin Result\" (>80% or <=80%) if Day 0 (or Day 1) and Day 4 values are available. Refer to http://www.[employer]ways[employer]Opointpct-calculator.com    Change in PCT <=80%  A decrease of PCT levels below or equal to 80% defines a positive change in PCT test result representing a higher risk for 28-day all-cause mortality of patients diagnosed with severe sepsis for septic shock.    Change in PCT >80%  A decrease of PCT levels of more than 80% defines a negative change in PCT result representing a lower risk for 28-day all-cause mortality of patients diagnosed with severe sepsis or septic shock.      BASIC METABOLIC PANEL   CBC (NO DIFF)   TROPONIN   CBC AND DIFFERENTIAL    Narrative:     The following orders were created for panel order CBC & Differential.  Procedure                               Abnormality         Status                     ---------                               -----------         ------                     CBC Auto Differential[[id number]]        Abnormal            Final result                 Please view results for these tests on the individual orders.       EKG:   ECG 12 Lead Dyspnea   Preliminary Result   HEART RATE=97  bpm   RR Orkbxtgc=642  ms   CT Vbagoznu=983  ms   P Horizontal Axis=-17  deg   P Front Axis=67  deg   QRSD Interval=84  ms   QT Ohqhyuwo=796  ms   SIrA=118  ms   QRS Axis=-21  deg   T Wave Axis=38  deg   - OTHERWISE NORMAL ECG -   Sinus " rhythm   Borderline left axis deviation   Date and Time of Study:2024-11-25 00:55:31          Meds given in ED:   Medications   sodium chloride 0.9 % flush 10 mL (has no administration in time range)   sodium chloride 0.9 % flush 10 mL (has no administration in time range)   sodium chloride 0.9 % flush 10 mL (has no administration in time range)   sodium chloride 0.9 % infusion 40 mL (has no administration in time range)   acetaminophen (TYLENOL) tablet 650 mg (has no administration in time range)     Or   acetaminophen (TYLENOL) 160 MG/5ML oral solution 650 mg (has no administration in time range)     Or   acetaminophen (TYLENOL) suppository 650 mg (has no administration in time range)   sennosides-docusate (PERICOLACE) 8.6-50 MG per tablet 2 tablet (has no administration in time range)     And   polyethylene glycol (MIRALAX) packet 17 g (has no administration in time range)     And   bisacodyl (DULCOLAX) EC tablet 5 mg (has no administration in time range)     And   bisacodyl (DULCOLAX) suppository 10 mg (has no administration in time range)   ondansetron ODT (ZOFRAN-ODT) disintegrating tablet 4 mg (has no administration in time range)     Or   ondansetron (ZOFRAN) injection 4 mg (has no administration in time range)   calcium carbonate (TUMS) chewable tablet 500 mg (200 mg elemental) (has no administration in time range)   methylPREDNISolone sodium succinate (SOLU-Medrol) injection 60 mg (has no administration in time range)   ipratropium-albuterol (DUO-NEB) nebulizer solution 3 mL (has no administration in time range)   ipratropium-albuterol (DUO-NEB) nebulizer solution 3 mL (has no administration in time range)   methylPREDNISolone sodium succinate (SOLU-Medrol) injection 125 mg (125 mg Intravenous Given 11/25/24 0105)       Imaging results:  XR Chest 1 View    Result Date: 11/25/2024  Cardiomegaly with suspected vascular congestion.  This report was finalized on 11/25/2024 1:22 AM by Dr. Danielle Arango M.D  on Workstation: [id number]       Ambulatory status:   - total      Social issues:   Social History     Socioeconomic History    Marital status: Single   Tobacco Use    Smoking status: Never    Smokeless tobacco: Never   Vaping Use    Vaping status: Never Used   Substance and Sexual Activity    Alcohol use: No     Comment: red wine occassional    Drug use: No    Sexual activity: Defer       Peripheral Neurovascular  Peripheral Neurovascular (Adult)  Peripheral Neurovascular WDL: WDL    Neuro Cognitive  Neuro Cognitive (Adult)  Cognitive/Neuro/Behavioral WDL: WDL    Learning  Learning Assessment  Learning Readiness and Ability: cognitive limitation noted  Education Provided  Person Taught: patient  Teaching Method: verbal instruction  Teaching Focus: diagnostic test, medication actions and side effects  Education Outcome Evaluation: verbalizes understanding    Respiratory  Respiratory WDL  Respiratory WDL: .WDL except, rhythm/pattern  Rhythm/Pattern, Respiratory: shortness of breath  Breath Sounds  All Lung Fields Breath Sounds: All Fields  All Lung Fields Breath Sounds: wheezes, expiratory    Abdominal Pain       Pain Assessments  Pain (Adult)  (0-10) Pain Rating: Rest: 4  Pain Location: back    NIH Stroke Scale       Pippa Rodarte RN  11/25/24 02:40 EST

## 2024-11-25 NOTE — CONSULTS
CONSULT NOTE    Patient Identification:  Tony Leonard  84 y.o.  male  1940  [id number]            Requesting physician: Dr. Jorje Mcdermott    Reason for Consultation: Shortness of breath acute exacerbation of underlying COPD/eosinophilia    CC: Shortness of breath    History of Present Illness:  Patient is a very nice 84-year-old with a previous medical history of asthma bronchiectasis eosinophilia congestive heart failure GERD who follows with Dr. Salter in our office who presented to the emergency room worsening shortness of breath.    Patient denied any chest pain pleuritic chest pain or sputum production.  He describes wheezing associated with the shortness of breath.  He denied any fever or chills.  He has no lethargy.  No chest pain no palpitations no nausea vomiting difficulty swallowing or sore throat.  He denies any increased sputum production denies feeling ill or lethargic.  He really states it all started when he stated that they turned up sticky in his room and made it very hot and this affected his breathing and made it hard for him to breathe.    He was evaluated in the ER admitted to the hospitalist service overnight.  He received IV steroids and DuoNebs and feels like his breathing has improved.  At home the patient takes Anoro as well as    Hypertonic saline and albuterol nebulizers at his baseline.    The patient has been afebrile since admission has been maintained on 2 L nasal cannula no desaturation noted  Lab values show procalcitonin 0.06 white blood cell count of 7.55 eosinophils of 0 hemoglobin 9.1    High-sensitivity troponin 38 flat    Respiratory viral panel negative    Chest x-ray with vascular congestion and cardiomegaly -personally reviewed images rotated film potentially atelectasis left base    Review of Systems:  As per HPI otherwise a 12 system review of systems performed and all else negative    Past Medical History:   Diagnosis Date    ADHD (attention  deficit hyperactivity disorder)     Bronchiectasis     CHF (congestive heart failure)     Colon polyp     COPD (chronic obstructive pulmonary disease)     Diverticulosis     Hard of hearing     On home oxygen therapy     2 LITERS    Pneumonia     Prostate cancer 04/22/2019    Spinal stenosis, lumbar region with neurogenic claudication 08/02/2022       Past Surgical History:   Procedure Laterality Date    BRONCHOSCOPY N/A 04/30/2016    Procedure: BRONCHOSCOPY with BAL of right lower lobe and left  lower lobe.  ;  Surgeon: Nando Diaz MD;  Location: [facility] ENDOSCOPY;  Service:     BRONCHOSCOPY N/A 04/28/2017    Procedure: BRONCHOSCOPY;  Surgeon: Katharina Salter MD;  Location: [facility] ENDOSCOPY;  Service:     BRONCHOSCOPY Bilateral 06/29/2017    Procedure: BRONCHOSCOPY WITH WASHINGS;  Surgeon: Katharina Salter MD;  Location: [facility] ENDOSCOPY;  Service:     BRONCHOSCOPY N/A 01/22/2018    Procedure: BRONCHOSCOPY AT BEDSIDE with BAL;  Surgeon: Katharina Salter MD;  Location: [facility] ENDOSCOPY;  Service:     BRONCHOSCOPY N/A 01/30/2018    Procedure: BRONCHOSCOPY with BAL and washing;  Surgeon: Shreyas Wild MD;  Location: [facility] ENDOSCOPY;  Service:     BRONCHOSCOPY Bilateral 04/24/2018    Procedure: BRONCHOSCOPY WITH WASHING;  Surgeon: Katharina Salter MD;  Location: [facility] ENDOSCOPY;  Service: Pulmonary    BRONCHOSCOPY N/A 12/28/2019    Procedure: BRONCHOSCOPY with bilateral washing;  Surgeon: Katharina Salter MD;  Location: [facility] ENDOSCOPY;  Service: Pulmonary    BRONCHOSCOPY Bilateral 01/04/2023    Procedure: BRONCHOSCOPY with lavage;  Surgeon: Katharina Salter MD;  Location: [facility] ENDOSCOPY;  Service: Pulmonary;  Laterality: Bilateral;  mucus plugging    CARDIAC CATHETERIZATION N/A 01/29/2023    Procedure: Left Heart Cath;  Surgeon: Johnnie Ang MD;  Location: [facility] CATH INVASIVE LOCATION;  Service: Cardiology;  Laterality: N/A;    CARDIAC CATHETERIZATION N/A 01/29/2023    Procedure: Coronary angiography;   Surgeon: Johnnie Ang MD;  Location: [facility] CATH INVASIVE LOCATION;  Service: Cardiology;  Laterality: N/A;    COLONOSCOPY  05/17/2013    eh, ih, tort, sig tics    COLONOSCOPY N/A 05/10/2019    Non-thrombosed external hemorrhoids found on perianal exam, Diverticulosis, Tortuous colon, One 5 mm polyp in the mid ascending colon, IH. Path: Tubular adenoma.     COLONOSCOPY N/A 09/24/2022    Procedure: COLONOSCOPY to cecum and TI with argon plasma coagulation.;  Surgeon: Bruce Black MD;  Location: [facility] ENDOSCOPY;  Service: Gastroenterology;  Laterality: N/A;  pre- GI bleeding  post- radiation proctitis, diverticulosis    ENDOSCOPY  03/19/2015    z line irreg, hh    ENDOSCOPY N/A 09/24/2022    Procedure: ESOPHAGOGASTRODUODENOSCOPY;  Surgeon: Bruce Black MD;  Location: [facility] ENDOSCOPY;  Service: Gastroenterology;  Laterality: N/A;  pre- GI bleeding  post- esophagitis, hiatal hernia    HIP OPEN REDUCTION Right 01/17/2018    Procedure: HIP OPEN REDUCTION INTERNAL FIXATION WITH DYNAMIC HIP SCREW;  Surgeon: Les Black MD;  Location: [facility] MAIN OR;  Service:     SPINE SURGERY      TONSILLECTOMY      TOTAL HIP ARTHROPLASTY REVISION Right 09/23/2019    Procedure: HIP  REVISION RIGHT;  Surgeon: Isauro Warner MD;  Location: [facility] MAIN OR;  Service: Orthopedics        Medications Prior to Admission   Medication Sig Dispense Refill Last Dose/Taking    acetaminophen (TYLENOL) 325 MG tablet Take 2 tablets by mouth Every 4 (Four) Hours As Needed for Mild Pain.       albuterol (PROVENTIL) (2.5 MG/3ML) 0.083% nebulizer solution Take 2.5 mg by nebulization Every 6 (Six) Hours As Needed for Wheezing. 360 mL 3 Taking As Needed    calcium carbonate (TUMS) 500 MG chewable tablet Chew 2 tablets Every 4 (Four) Hours As Needed for Indigestion or Heartburn.   Taking As Needed    dicyclomine (BENTYL) 10 MG capsule Take 1 capsule by mouth 3 (Three) Times a Day As Needed (abdominal bloating). 30 capsule 0 Taking As  Needed    FLUoxetine (PROzac) 20 MG capsule TAKE 1 CAPSULE DAILY 90 capsule 3 Taking    furosemide (LASIX) 20 MG tablet Take 1 tablet by mouth 2 (Two) Times a Day.   Taking    gabapentin (NEURONTIN) 300 MG capsule Take 1 capsule by mouth 3 (Three) Times a Day. 6 capsule 0 Taking    guaiFENesin (MUCINEX) 600 MG 12 hr tablet Take 2 tablets by mouth 2 (Two) Times a Day.   Taking    Melatonin 3 MG capsule Take 1 capsule by mouth As Needed.   Taking As Needed    multivitamin with minerals (Multivitamin Adult) tablet tablet Take 1 tablet by mouth Daily. 30 tablet 11 Taking    ondansetron ODT (ZOFRAN-ODT) 4 MG disintegrating tablet Place 1 tablet on the tongue Every 6 (Six) Hours As Needed for Nausea or Vomiting.   Taking As Needed    pantoprazole (PROTONIX) 40 MG EC tablet Take 1 tablet by mouth Daily. 90 tablet 3 Taking    sodium chloride 7 % nebulizer solution nebulizer solution Take 4 mL by nebulization Every 4 (Four) Hours As Needed (pt takes as needed at home).   Taking As Needed    sucralfate (Carafate) 1 g tablet Take 1 tablet by mouth 4 (Four) Times a Day. 120 tablet 0 Taking    tiZANidine (ZANAFLEX) 2 MG tablet Take 1 tablet by mouth Daily As Needed for Muscle Spasms.   Taking As Needed    Umeclidinium-Vilanterol (ANORO ELLIPTA) 62.5-25 MCG/ACT aerosol powder  inhaler Inhale 1 puff Daily. 1 each 11 Taking       Allergies   Allergen Reactions    Daliresp [Roflumilast] Anaphylaxis    Latex Rash    Sulfa Antibiotics Rash       Social History     Socioeconomic History    Marital status: Single   Tobacco Use    Smoking status: Never    Smokeless tobacco: Never   Vaping Use    Vaping status: Never Used   Substance and Sexual Activity    Alcohol use: No     Comment: red wine occassional    Drug use: No    Sexual activity: Defer       Family History   Problem Relation Age of Onset    Thyroid disease Mother     No Known Problems Father     Malig Hyperthermia Neg Hx        Physical Exam:  /72 (BP Location: Right arm,  "Patient Position: Lying)   Pulse 83   Temp 97.9 °F (36.6 °C) (Oral)   Resp 18   Ht 180.3 cm (71\")   Wt 84.7 kg (186 lb 11.7 oz)   SpO2 96%   BMI 26.04 kg/m²   Body mass index is 26.04 kg/m².   General appearance: Appears stated age, conversant   Eyes: Anicteric sclerae, moist conjunctivae; no lid lag;   HENT: Atraumatic; oropharynx clear with moist mucous membranes and no mucosal ulcerations; normal hard and soft palate  Neck: Trachea midline; supple  Lungs: No wheeze no rhonchi, with normal respiratory effort and no intercostal retractions  CV: RRR, no rub  Abdomen: Soft, nontender; no masses or HSM  Extremities: Mild peripheral edema  Skin:  warm dry well-perfused  Psych: Appropriate affect, alert neuro cranials 2 through 12 grossly intact speech intact moves all extremities      LABS:  Results from last 7 days   Lab Units 11/25/24 0358 11/25/24 0043   WBC 10*3/mm3 6.67 7.55   HEMOGLOBIN g/dL 9.4* 9.1*   PLATELETS 10*3/mm3 290 305     Results from last 7 days   Lab Units 11/25/24 0358 11/25/24  0043   SODIUM mmol/L 139 138   POTASSIUM mmol/L 3.9 4.5   CHLORIDE mmol/L 103 102   CO2 mmol/L 25.5 25.8   BUN mg/dL 14 15   CREATININE mg/dL 0.92 1.03   GLUCOSE mg/dL 119* 107*   CALCIUM mg/dL 8.8 8.7   Estimated Creatinine Clearance: 69 mL/min (by C-G formula based on SCr of 0.92 mg/dL).    Imaging: I personally visualized the images of scans/x-rays performed within last 3 days.  Imaging Results (Most Recent)       Procedure Component Value Units Date/Time    XR Chest 1 View [[id number]] Collected: 11/25/24 0121     Updated: 11/25/24 0125    Narrative:      SINGLE VIEW OF THE CHEST     HISTORY: Dyspnea     COMPARISON: April 19, 2024     FINDINGS:  There is cardiomegaly. There is vascular congestion. There is  calcification of the aorta. There is a small left pleural effusion. No  pneumothorax is seen. Lung volumes remain diminished. There are  background changes of COPD.       Impression:      Cardiomegaly with " suspected vascular congestion.     This report was finalized on 11/25/2024 1:22 AM by Dr. Danielle Arango M.D on Workstation: [id number]               Assessment / Recommendations:  COPD/eosinophilic asthma/bronchiectasis with acute exacerbation  Congestive heart failure  GERD    Infectious workup seems unrevealing patient reports symptoms started when his feet got turned up in his room.  Continue steroids for now  Scheduled DuoNebs Brovana budesonide hypertonic saline  Flutter valve  Incentive spirometer  Azithromycin for acute exacerbation  Repeat two-view chest x-ray in the morning to evaluate the left base.  Reviewed outpatient office notes spirometry  Reviewed imaging personally.      Electronically signed by Scott Bob MD, 11/25/24, 1:18 PM EST.          Scott Bob MD  [facility]  11/25/24  12:42 EST

## 2024-11-25 NOTE — PLAN OF CARE
Problem: Adult Inpatient Plan of Care  Goal: Plan of Care Review  Outcome: Progressing  Flowsheets (Taken 11/25/2024 4096)  Progress: no change  Outcome Evaluation: Pt admitted from ED today. On 2 L NC. Cardiac diet. VSS. Pt safety maintained  Plan of Care Reviewed With: patient

## 2024-11-25 NOTE — DISCHARGE PLACEMENT REQUEST
"Tony Casey (84 y.o. Male)       Date of Birth   1940    Social Security Number       Address   [address] [place] [address]    Home Phone   [phone]    MRN   [id number]       [employer]   [employer]    Marital Status   Single                            Admission Date   11/25/24    Admission Type   Emergency    Admitting Provider   Jorje Mcdermott MD    Attending Provider   Jorje Mcdermott MD    Department, Room/Bed   [address], [address]/1       Discharge Date       Discharge Disposition       Discharge Destination                                 Attending Provider: Jorje Mcdermott MD    Allergies: Daliresp [Roflumilast], Latex, Sulfa Antibiotics    Isolation: None   Infection: None   Code Status: CPR    Ht: 180.3 cm (71\")   Wt: 84.7 kg (186 lb 11.7 oz)    Admission Cmt: None   Principal Problem: COPD with acute exacerbation [J44.1]                   Active Insurance as of 11/25/2024       Primary Coverage       Payor Plan Insurance Group Employer/Plan Group    HUMANA MEDICARE REPLACEMENT HUMANA MED ADV SNP PPO [id number]       Payor Plan Address Payor Plan Phone Number Payor Plan Fax Number Effective Dates    [address] [address] [phone]  11/1/2024 - None Entered    [facility] [id number]         Subscriber Name Subscriber Birth Date Member ID       TONY CASEY 1940 [id number]               Secondary Coverage       Payor Plan Insurance Group Employer/Plan Group     FOR LIFE  FOR LIFE [employer] SUP         Payor Plan Address Payor Plan Phone Number Payor Plan Fax Number Effective Dates    [address] [phone]  3/10/2016 - None Entered    [facility] [id number]         Subscriber Name Subscriber Birth Date Member ID       TONY CASEY 1940 [id number]                     Emergency Contacts        (Rel.) Home Phone Work Phone Mobile Phone    VICKY CASEY (Son) [phone] -- [phone]    " Frederick Leonard (Son) -- -- [phone]    Ricardo Leonard (Son) [phone] -- [phone]

## 2024-11-25 NOTE — PLAN OF CARE
Goal Outcome Evaluation:              Outcome Evaluation: Clinical swallow eval completed. Oropharyngeal swallow appears functional. Vertical mastication pattern, likely due to missing upper molars. Pt interested in soft-to-chew textures to assist with chewing. No acute needs identified at this time. SLP will sign off. If there is a concern about silent aspiration, please order video swallow study. Thank you very much for the courtesy of this consultation!     Diet texture recommendation: Soft-to-chew, thin liquids.     Meds: Per patient preference, as tolerated.     Aspiration precautions: Fully upright for all oral intake.      Anticipated Discharge Disposition (SLP): unknown          SLP Swallowing Diagnosis: swallow WFL/no suspected pharyngeal impairment (11/25/24 7598)

## 2024-11-25 NOTE — CONSULTS
Chap visited pt.  Pt was lying down with the lights off.  Pt said he was Protestant and did not need anything from the chap.  Chap remains available.

## 2024-11-25 NOTE — H&P
Patient Name:  Tony Leonard  YOB: 1940  MRN:  [id number]  Admit Date:  11/25/2024  Patient Care Team:  Alfonso Goldstein MD as PCP - General (Family Medicine)  Katharina Salter MD as Consulting Physician (Pulmonary Disease)  Anum Bhatt MD as Consulting Physician (Pain Medicine)      Subjective   History Present Illness     Chief Complaint   Patient presents with    Shortness of Breath       Mr. Leonard is a 84 y.o. with a history of COPD, CHF who presents to [facility] complaining of worsening shortness of breath and wheezing.  He does not report much productive cough.  No fevers or chills reported.  He does not think he has had any sick contacts recently.  Not reporting any chest pain orthopnea or worsened edema.  He feels improved compared to his presentation last night and does feel like he is able to move a bit more air after getting the steroids and breathing treatments.    Review of Systems   Constitutional:  Negative for chills and fever.   HENT:  Negative for sore throat and trouble swallowing.    Eyes:  Negative for pain and visual disturbance.   Respiratory:  Positive for shortness of breath and wheezing.    Cardiovascular:  Negative for chest pain and palpitations.   Gastrointestinal:  Negative for diarrhea, nausea and vomiting.   Endocrine: Negative for cold intolerance and heat intolerance.   Genitourinary:  Negative for dysuria and flank pain.   Musculoskeletal:  Negative for neck pain and neck stiffness.   Skin:  Negative for pallor and rash.   Allergic/Immunologic: Negative for environmental allergies and food allergies.   Neurological:  Negative for seizures and syncope.   Hematological:  Negative for adenopathy. Does not bruise/bleed easily.   Psychiatric/Behavioral:  Negative for agitation and confusion.         Personal History     Past Medical History:   Diagnosis Date    ADHD (attention deficit hyperactivity disorder)     Bronchiectasis     CHF  (congestive heart failure)     Colon polyp     COPD (chronic obstructive pulmonary disease)     Diverticulosis     Hard of hearing     On home oxygen therapy     2 LITERS    Pneumonia     Prostate cancer 04/22/2019    Spinal stenosis, lumbar region with neurogenic claudication 08/02/2022     Past Surgical History:   Procedure Laterality Date    BRONCHOSCOPY N/A 04/30/2016    Procedure: BRONCHOSCOPY with BAL of right lower lobe and left  lower lobe.  ;  Surgeon: Nando Diaz MD;  Location: [facility] ENDOSCOPY;  Service:     BRONCHOSCOPY N/A 04/28/2017    Procedure: BRONCHOSCOPY;  Surgeon: Katharina Salter MD;  Location: [facility] ENDOSCOPY;  Service:     BRONCHOSCOPY Bilateral 06/29/2017    Procedure: BRONCHOSCOPY WITH WASHINGS;  Surgeon: Katharina Salter MD;  [facility];  Service:     BRONCHOSCOPY N/A 01/22/2018    Procedure: BRONCHOSCOPY AT BEDSIDE with BAL;  Surgeon: Katharina Salter MD;  Location: [facility] ENDOSCOPY;  Service:     BRONCHOSCOPY N/A 01/30/2018    Procedure: BRONCHOSCOPY with BAL and washing;  Surgeon: Shreyas Wild MD;  Location: [facility] ENDOSCOPY;  Service:     BRONCHOSCOPY Bilateral 04/24/2018    Procedure: BRONCHOSCOPY WITH WASHING;  Surgeon: Katharina Salter MD;  Location: [facility] ENDOSCOPY;  Service: Pulmonary    BRONCHOSCOPY N/A 12/28/2019    Procedure: BRONCHOSCOPY with bilateral washing;  Surgeon: Katharina Salter MD;  Location: [facility] ENDOSCOPY;  Service: Pulmonary    BRONCHOSCOPY Bilateral 01/04/2023    Procedure: BRONCHOSCOPY with lavage;  Surgeon: Katharina Salter MD;  Location: [facility] ENDOSCOPY;  Service: Pulmonary;  Laterality: Bilateral;  mucus plugging    CARDIAC CATHETERIZATION N/A 01/29/2023    Procedure: Left Heart Cath;  Surgeon: Johnnie Ang MD;  Location: [facility] CATH INVASIVE LOCATION;  Service: Cardiology;  Laterality: N/A;    CARDIAC CATHETERIZATION N/A 01/29/2023    Procedure: Coronary angiography;  Surgeon: Johnnie Ang MD;  Location: [facility]  INVASIVE LOCATION;  Service: Cardiology;  Laterality: N/A;    COLONOSCOPY  05/17/2013    eh, ih, tort, sig tics    COLONOSCOPY N/A 05/10/2019    Non-thrombosed external hemorrhoids found on perianal exam, Diverticulosis, Tortuous colon, One 5 mm polyp in the mid ascending colon, IH. Path: Tubular adenoma.     COLONOSCOPY N/A 09/24/2022    Procedure: COLONOSCOPY to cecum and TI with argon plasma coagulation.;  Surgeon: Bruce Black MD;  [facility];  Service: Gastroenterology;  Laterality: N/A;  pre- GI bleeding  post- radiation proctitis, diverticulosis    ENDOSCOPY  03/19/2015    z line irreg, hh    ENDOSCOPY N/A 09/24/2022    Procedure: ESOPHAGOGASTRODUODENOSCOPY;  Surgeon: Bruce Black MD;  Location: [facility] ENDOSCOPY;  Service: Gastroenterology;  Laterality: N/A;  pre- GI bleeding  post- esophagitis, hiatal hernia    HIP OPEN REDUCTION Right 01/17/2018    Procedure: HIP OPEN REDUCTION INTERNAL FIXATION WITH DYNAMIC HIP SCREW;  Surgeon: Les Black MD;  Location: [facility] MAIN OR;  Service:     SPINE SURGERY      TONSILLECTOMY      TOTAL HIP ARTHROPLASTY REVISION Right 09/23/2019    Procedure: HIP  REVISION RIGHT;  Surgeon: Isauro Warner MD;  Location: [facility] MAIN OR;  Service: Orthopedics     Family History   Problem Relation Age of Onset    Thyroid disease Mother     No Known Problems Father     Malig Hyperthermia Neg Hx      Social History     Tobacco Use    Smoking status: Never    Smokeless tobacco: Never   Vaping Use    Vaping status: Never Used   Substance Use Topics    Alcohol use: No     Comment: red wine occassional    Drug use: No     No current facility-administered medications on file prior to encounter.     Current Outpatient Medications on File Prior to Encounter   Medication Sig Dispense Refill    acetaminophen (TYLENOL) 325 MG tablet Take 2 tablets by mouth Every 4 (Four) Hours As Needed for Mild Pain.      albuterol (PROVENTIL) (2.5 MG/3ML) 0.083% nebulizer solution  Take 2.5 mg by nebulization Every 6 (Six) Hours As Needed for Wheezing. 360 mL 3    calcium carbonate (TUMS) 500 MG chewable tablet Chew 2 tablets Every 4 (Four) Hours As Needed for Indigestion or Heartburn.      dicyclomine (BENTYL) 10 MG capsule Take 1 capsule by mouth 3 (Three) Times a Day As Needed (abdominal bloating). 30 capsule 0    FLUoxetine (PROzac) 20 MG capsule TAKE 1 CAPSULE DAILY 90 capsule 3    furosemide (LASIX) 20 MG tablet Take 1 tablet by mouth 2 (Two) Times a Day.      gabapentin (NEURONTIN) 300 MG capsule Take 1 capsule by mouth 3 (Three) Times a Day. 6 capsule 0    guaiFENesin (MUCINEX) 600 MG 12 hr tablet Take 2 tablets by mouth 2 (Two) Times a Day.      Melatonin 3 MG capsule Take 1 capsule by mouth As Needed.      multivitamin with minerals (Multivitamin Adult) tablet tablet Take 1 tablet by mouth Daily. 30 tablet 11    ondansetron ODT (ZOFRAN-ODT) 4 MG disintegrating tablet Place 1 tablet on the tongue Every 6 (Six) Hours As Needed for Nausea or Vomiting.      pantoprazole (PROTONIX) 40 MG EC tablet Take 1 tablet by mouth Daily. 90 tablet 3    sodium chloride 7 % nebulizer solution nebulizer solution Take 4 mL by nebulization Every 4 (Four) Hours As Needed (pt takes as needed at home).      sucralfate (Carafate) 1 g tablet Take 1 tablet by mouth 4 (Four) Times a Day. 120 tablet 0    tiZANidine (ZANAFLEX) 2 MG tablet Take 1 tablet by mouth Daily As Needed for Muscle Spasms.      Umeclidinium-Vilanterol (ANORO ELLIPTA) 62.5-25 MCG/ACT aerosol powder  inhaler Inhale 1 puff Daily. 1 each 11     Allergies   Allergen Reactions    Daliresp [Roflumilast] Anaphylaxis    Latex Rash    Sulfa Antibiotics Rash       Objective    Objective     Vital Signs  Temp:  [97.4 °F (36.3 °C)-98.8 °F (37.1 °C)] 97.9 °F (36.6 °C)  Heart Rate:  [] 83  Resp:  [18-20] 18  BP: (100-116)/(51-72) 102/72  SpO2:  [90 %-99 %] 96 %  on  Flow (L/min) (Oxygen Therapy):  [1-2] 1;   Device (Oxygen Therapy): nasal  cannula  Body mass index is 26.04 kg/m².    Physical Exam  Vitals and nursing note reviewed.   Constitutional:       General: He is not in acute distress.     Appearance: He is not diaphoretic.   HENT:      Head: Atraumatic.   Eyes:      General: No scleral icterus.  Cardiovascular:      Rate and Rhythm: Normal rate and regular rhythm.      Pulses: Normal pulses.   Pulmonary:      Breath sounds: Decreased breath sounds and wheezing present.   Abdominal:      General: There is no distension.      Palpations: Abdomen is soft.      Tenderness: There is no abdominal tenderness. There is no guarding or rebound.   Musculoskeletal:         General: No tenderness.      Right lower leg: Edema present.      Left lower leg: Edema present.      Comments: trace   Skin:     General: Skin is warm and dry.   Neurological:      Mental Status: He is alert. Mental status is at baseline.      Comments: [facility]   Psychiatric:         Mood and Affect: Mood normal.         Behavior: Behavior normal.         Results Review:  I reviewed the patient's new clinical results.  I reviewed the patient's new imaging results and agree with the interpretation.  I personally viewed and interpreted the patient's EKG/Telemetry data  I reviewed prior records.    Lab Results (last 24 hours)       Procedure Component Value Units Date/Time    CBC & Differential [[id number]]  (Abnormal) Collected: 11/25/24 0043    Specimen: Blood Updated: 11/25/24 0056    Narrative:      The following orders were created for panel order CBC & Differential.  Procedure                               Abnormality         Status                     ---------                               -----------         ------                     CBC Auto Differential[[id number]]        Abnormal            Final result                 Please view results for these tests on the individual orders.    Comprehensive Metabolic Panel [[id number]]  (Abnormal) Collected: 11/25/24 0043    Specimen: Blood  Updated: 11/25/24 0122     Glucose 107 mg/dL      BUN 15 mg/dL      Creatinine 1.03 mg/dL      Sodium 138 mmol/L      Potassium 4.5 mmol/L      Comment: Specimen hemolyzed.  Result may be falsely elevated.        Chloride 102 mmol/L      CO2 25.8 mmol/L      Calcium 8.7 mg/dL      Total Protein 6.2 g/dL      Albumin 3.4 g/dL      ALT (SGPT) 11 U/L      Comment: Specimen hemolyzed.  Result may  be falsely elevated.        AST (SGOT) 31 U/L      Comment: Specimen hemolyzed.  Result may be falsely elevated.        Alkaline Phosphatase 71 U/L      Total Bilirubin 0.4 mg/dL      Globulin 2.8 gm/dL      A/G Ratio 1.2 g/dL      BUN/Creatinine Ratio 14.6     Anion Gap 10.2 mmol/L      eGFR 70.7 mL/min/1.73     Narrative:      GFR Normal >60  Chronic Kidney Disease <60  Kidney Failure <15    The GFR formula is only valid for adults with stable renal function between ages 18 and 70.    BNP [[id number]]  (Normal) Collected: 11/25/24 0043    Specimen: Blood Updated: 11/25/24 0125     proBNP 410.0 pg/mL     Narrative:      This assay is used as an aid in the diagnosis of individuals suspected of having heart failure. It can be used as an aid in the diagnosis of acute decompensated heart failure (ADHF) in patients presenting with signs and symptoms of ADHF to the emergency department (ED). In addition, NT-proBNP of <300 pg/mL indicates ADHF is not likely.    Age Range Result Interpretation  NT-proBNP Concentration (pg/mL:      <50             Positive            >450                   Gray                 300-450                    Negative             <300    50-75           Positive            >900                  Gray                300-900                  Negative            <300      >75             Positive            >1800                  Gray                300-1800                  Negative            <300    Procalcitonin [[id number]]  (Normal) Collected: 11/25/24 0043    Specimen: Blood Updated: 11/25/24 0125      "Procalcitonin 0.06 ng/mL     Narrative:      As a Marker for Sepsis (Non-Neonates):    1. <0.5 ng/mL represents a low risk of severe sepsis and/or septic shock.  2. >2 ng/mL represents a high risk of severe sepsis and/or septic shock.    As a Marker for Lower Respiratory Tract Infections that require antibiotic therapy:    PCT on Admission    Antibiotic Therapy       6-12 Hrs later    >0.5                Strongly Recommended  >0.25 - <0.5        Recommended   0.1 - 0.25          Discouraged              Remeasure/reassess PCT  <0.1                Strongly Discouraged     Remeasure/reassess PCT    As 28 day mortality risk marker: \"Change in Procalcitonin Result\" (>80% or <=80%) if Day 0 (or Day 1) and Day 4 values are available. Refer to [email or web address]    Change in PCT <=80%  A decrease of PCT levels below or equal to 80% defines a positive change in PCT test result representing a higher risk for 28-day all-cause mortality of patients diagnosed with severe sepsis for septic shock.    Change in PCT >80%  A decrease of PCT levels of more than 80% defines a negative change in PCT result representing a lower risk for 28-day all-cause mortality of patients diagnosed with severe sepsis or septic shock.       CBC Auto Differential [[id number]]  (Abnormal) Collected: 11/25/24 0043    Specimen: Blood Updated: 11/25/24 0056     WBC 7.55 10*3/mm3      RBC 3.76 10*6/mm3      Hemoglobin 9.1 g/dL      Hematocrit 29.1 %      MCV 77.4 fL      MCH 24.2 pg      MCHC 31.3 g/dL      RDW 16.3 %      RDW-SD 45.6 fl      MPV 9.1 fL      Platelets 305 10*3/mm3      Neutrophil % 61.1 %      Lymphocyte % 29.4 %      Monocyte % 9.1 %      Eosinophil % 0.0 %      Basophil % 0.1 %      Immature Grans % 0.3 %      Neutrophils, Absolute 4.61 10*3/mm3      Lymphocytes, Absolute 2.22 10*3/mm3      Monocytes, Absolute 0.69 10*3/mm3      Eosinophils, Absolute 0.00 10*3/mm3      Basophils, Absolute 0.01 10*3/mm3      Immature " Grans, Absolute 0.02 10*3/mm3      nRBC 0.0 /100 WBC     Single High Sensitivity Troponin T [[id number]]  (Abnormal) Collected: 11/25/24 0136    Specimen: Blood Updated: 11/25/24 0204     HS Troponin T 38 ng/L     Narrative:      High Sensitive Troponin T Reference Range:  <14.0 ng/L- Negative Female for AMI  <22.0 ng/L- Negative Male for AMI  >=14 - Abnormal Female indicating possible myocardial injury.  >=22 - Abnormal Male indicating possible myocardial injury.   Clinicians would have to utilize clinical acumen, EKG, Troponin, and serial changes to determine if it is an Acute Myocardial Infarction or myocardial injury due to an underlying chronic condition.         Basic Metabolic Panel [[id number]]  (Abnormal) Collected: 11/25/24 0358    Specimen: Blood Updated: 11/25/24 0453     Glucose 119 mg/dL      BUN 14 mg/dL      Creatinine 0.92 mg/dL      Sodium 139 mmol/L      Potassium 3.9 mmol/L      Chloride 103 mmol/L      CO2 25.5 mmol/L      Calcium 8.8 mg/dL      BUN/Creatinine Ratio 15.2     Anion Gap 10.5 mmol/L      eGFR 81.0 mL/min/1.73     Narrative:      GFR Normal >60  Chronic Kidney Disease <60  Kidney Failure <15    The GFR formula is only valid for adults with stable renal function between ages 18 and 70.    CBC (No Diff) [[id number]]  (Abnormal) Collected: 11/25/24 0358    Specimen: Blood Updated: 11/25/24 0430     WBC 6.67 10*3/mm3      RBC 3.87 10*6/mm3      Hemoglobin 9.4 g/dL      Hematocrit 30.0 %      MCV 77.5 fL      MCH 24.3 pg      MCHC 31.3 g/dL      RDW 16.4 %      RDW-SD 46.0 fl      MPV 9.2 fL      Platelets 290 10*3/mm3     High Sensitivity Troponin T [[id number]]  (Abnormal) Collected: 11/25/24 0358    Specimen: Blood Updated: 11/25/24 0453     HS Troponin T 33 ng/L     Narrative:      High Sensitive Troponin T Reference Range:  <14.0 ng/L- Negative Female for AMI  <22.0 ng/L- Negative Male for AMI  >=14 - Abnormal Female indicating possible myocardial injury.  >=22 - Abnormal Male  indicating possible myocardial injury.   Clinicians would have to utilize clinical acumen, EKG, Troponin, and serial changes to determine if it is an Acute Myocardial Infarction or myocardial injury due to an underlying chronic condition.         High Sensitivity Troponin T 2Hr [[id number]]  (Abnormal) Collected: 11/25/24 0553    Specimen: Blood Updated: 11/25/24 0650     HS Troponin T 28 ng/L      Troponin T Delta -5 ng/L     Narrative:      High Sensitive Troponin T Reference Range:  <14.0 ng/L- Negative Female for AMI  <22.0 ng/L- Negative Male for AMI  >=14 - Abnormal Female indicating possible myocardial injury.  >=22 - Abnormal Male indicating possible myocardial injury.   Clinicians would have to utilize clinical acumen, EKG, Troponin, and serial changes to determine if it is an Acute Myocardial Infarction or myocardial injury due to an underlying chronic condition.         Respiratory Panel PCR w/COVID-19(SARS-CoV-2) JARRETT/AMPARO/ROYAL/PAD/COR/JERMAIN In-House, NP Swab in UTM/VTM, 2 HR TAT - Swab, Nasopharynx [[id number]]  (Normal) Collected: 11/25/24 1002    Specimen: Swab from Nasopharynx Updated: 11/25/24 1111     ADENOVIRUS, PCR Not Detected     Coronavirus 229E Not Detected     Coronavirus HKU1 Not Detected     Coronavirus NL63 Not Detected     Coronavirus OC43 Not Detected     COVID19 Not Detected     Human Metapneumovirus Not Detected     Human Rhinovirus/Enterovirus Not Detected     Influenza A PCR Not Detected     Influenza B PCR Not Detected     Parainfluenza Virus 1 Not Detected     Parainfluenza Virus 2 Not Detected     Parainfluenza Virus 3 Not Detected     Parainfluenza Virus 4 Not Detected     RSV, PCR Not Detected     Bordetella pertussis pcr Not Detected     Bordetella parapertussis PCR Not Detected     Chlamydophila pneumoniae PCR Not Detected     Mycoplasma pneumo by PCR Not Detected    Narrative:      In the setting of a positive respiratory panel with a viral infection PLUS a negative procalcitonin  without other underlying concern for bacterial infection, consider observing off antibiotics or discontinuation of antibiotics and continue supportive care. If the respiratory panel is positive for atypical bacterial infection (Bordetella pertussis, Chlamydophila pneumoniae, or Mycoplasma pneumoniae), consider antibiotic de-escalation to target atypical bacterial infection.            Imaging Results (Last 24 Hours)       Procedure Component Value Units Date/Time    XR Chest 1 View [[id number]] Collected: 11/25/24 0121     Updated: 11/25/24 0125    Narrative:      SINGLE VIEW OF THE CHEST     HISTORY: Dyspnea     COMPARISON: April 19, 2024     FINDINGS:  There is cardiomegaly. There is vascular congestion. There is  calcification of the aorta. There is a small left pleural effusion. No  pneumothorax is seen. Lung volumes remain diminished. There are  background changes of COPD.       Impression:      Cardiomegaly with suspected vascular congestion.     This report was finalized on 11/25/2024 1:22 AM by Dr. Danielle Arango M.D on Workstation: [id number]               Results for orders placed during the hospital encounter of 12/21/23    Adult Transthoracic Echo Complete W/ Cont if Necessary Per Protocol    Interpretation Summary    Left ventricular ejection fraction appears to be 56 - 60%.    Left ventricular diastolic function was normal.    Moderate dilation of the aortic root is present. Mild dilation of the sinuses of Valsalva is present.      ECG 12 Lead Dyspnea   Preliminary Result   HEART RATE=97  bpm   RR Uihpccqo=253  ms   MS Tphmeutl=207  ms   P Horizontal Axis=-17  deg   P Front Axis=67  deg   QRSD Interval=84  ms   QT Zsdwhhjg=171  ms   BNhA=847  ms   QRS Axis=-21  deg   T Wave Axis=38  deg   - OTHERWISE NORMAL ECG -   Sinus rhythm   Borderline left axis deviation   Date and Time of Study:2024-11-25 00:55:31      Telemetry Scan   Final Result      Telemetry Scan   Final Result      Telemetry Scan    Final Result           Assessment/Plan     Active Hospital Problems    Diagnosis  POA    **COPD with acute exacerbation [J44.1]  Yes    COPD exacerbation [J44.1]  Yes      Resolved Hospital Problems   No resolved problems to display.       Mr. Leonard is a 84 y.o.     COPD exacerbation: Has wheezes on exam.  Procalcitonin was not elevated.  Respiratory viral panel negative.  Continue steroid and breathing treatments.  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 Will place pulmonology consult.  CHF: Does not have a lot of peripheral volume excess.  BNP is on the low side.  Plan to resume home regimen and monitor.  Wean O2 as tolerated  GERD: PPI and Carafate  PPx: Lovenox  I discussed the patient's findings and my recommendations with patient and nursing staff.      Jorje Mcdermott MD  [facility]  11/25/24  11:33 EST    Dictated portions of note using dragon dictation software.

## 2024-11-25 NOTE — THERAPY EVALUATION
Acute Care - Speech Language Pathology   Swallow Initial Evaluation [facility]     Patient Name: Tony Leonard  : 1940  MRN: [id number]  Today's Date: 2024               Admit Date: 2024    Visit Dx:     ICD-10-CM ICD-9-CM   1. COPD with acute exacerbation  J44.1 491.21   2. Chronic anemia  D64.9 285.9     Patient Active Problem List   Diagnosis    Thrush, oral    ADD (attention deficit disorder)    Hemorrhoids    Bronchiectasis with acute lower respiratory infection    Diverticul disease small and large intestine, no perforati or abscess    Benign non-nodular prostatic hyperplasia without lower urinary tract symptoms    Gastroesophageal reflux disease    Malaise and fatigue    Environmental allergies    Hemoptysis    Dyslipidemia    Primary osteoarthritis involving multiple joints    Pneumonia of right lower lobe due to infectious organism    Abnormal EKG    COPD (chronic obstructive pulmonary disease)    Mild malnutrition    Acute on chronic respiratory failure with hypoxia    Fracture, intertrochanteric, right femur, closed, initial encounter    Chronic diastolic CHF (congestive heart failure)    Hematoma of frontal scalp    Postoperative anemia due to acute blood loss    Urinary retention    Hemorrhagic disorder due to circulating anticoagulants    History of fracture of right hip    Closed fracture of right hip with routine healing    Chronic low back pain with sciatica    Change in bowel function    Rectal bleeding    Prostate cancer    On home oxygen therapy    Acute viral bronchitis    Peripheral neuropathy    Plantar fasciitis    HTN (hypertension)    COPD exacerbation    Bilateral lower extremity edema    Microscopic hematuria    Acute midline low back pain with right-sided sciatica    Spinal stenosis, lumbar region with neurogenic claudication    Compression deformity of vertebra    Rectal bleed    Esophagitis    Angiectasia    Hiatal hernia    Overweight (BMI 25.0-29.9)     Leukocytosis    Bilateral hip pain    Elevated troponin level not due myocardial infarction    Nocturnal hypoxia    Pneumonia of right upper lobe due to infectious organism    NSTEMI (non-ST elevated myocardial infarction)    Chronic respiratory failure with hypoxia    Paroxysmal atrial fibrillation    Acute exacerbation of chronic obstructive pulmonary disease (COPD)    Anemia    Non-compliant patient    Hypokalemia    Spondylolisthesis of lumbar region    Elevated troponin    Rhabdomyolysis    Multiple contusions    Fall    Dizziness    Contusion of hip    Pulmonary embolism    COPD with acute exacerbation     Past Medical History:   Diagnosis Date    ADHD (attention deficit hyperactivity disorder)     Bronchiectasis     CHF (congestive heart failure)     Colon polyp     COPD (chronic obstructive pulmonary disease)     Diverticulosis     Hard of hearing     On home oxygen therapy     2 LITERS    Pneumonia     Prostate cancer 04/22/2019    Spinal stenosis, lumbar region with neurogenic claudication 08/02/2022     Past Surgical History:   Procedure Laterality Date    BRONCHOSCOPY N/A 04/30/2016    Procedure: BRONCHOSCOPY with BAL of right lower lobe and left  lower lobe.  ;  Surgeon: Nando Diaz MD;  Location: [facility] ENDOSCOPY;  Service:     BRONCHOSCOPY N/A 04/28/2017    Procedure: BRONCHOSCOPY;  Surgeon: Katharina Salter MD;  Location: [facility] ENDOSCOPY;  Service:     BRONCHOSCOPY Bilateral 06/29/2017    Procedure: BRONCHOSCOPY WITH WASHINGS;  Surgeon: Katharina Salter MD;  Location: [facility] ENDOSCOPY;  Service:     BRONCHOSCOPY N/A 01/22/2018    Procedure: BRONCHOSCOPY AT BEDSIDE with BAL;  Surgeon: Katharina Salter MD;  Location: [facility] ENDOSCOPY;  Service:     BRONCHOSCOPY N/A 01/30/2018    Procedure: BRONCHOSCOPY with BAL and washing;  Surgeon: Shreyas Wild MD;  Location: [facility] ENDOSCOPY;  Service:     BRONCHOSCOPY Bilateral 04/24/2018    Procedure: BRONCHOSCOPY WITH WASHING;  Surgeon: Katharina Salter MD;   Location: [facility] ENDOSCOPY;  Service: Pulmonary    BRONCHOSCOPY N/A 12/28/2019    Procedure: BRONCHOSCOPY with bilateral washing;  Surgeon: Katharina Salter MD;  Location: [facility] ENDOSCOPY;  Service: Pulmonary    BRONCHOSCOPY Bilateral 01/04/2023    Procedure: BRONCHOSCOPY with lavage;  Surgeon: Katharina Salter MD;  Location: [facility] ENDOSCOPY;  Service: Pulmonary;  Laterality: Bilateral;  mucus plugging    CARDIAC CATHETERIZATION N/A 01/29/2023    Procedure: Left Heart Cath;  Surgeon: Johnnie Ang MD;  Location: [facility] CATH INVASIVE LOCATION;  Service: Cardiology;  Laterality: N/A;    CARDIAC CATHETERIZATION N/A 01/29/2023    Procedure: Coronary angiography;  Surgeon: Johnnie Ang MD;  Location: [facility] CATH INVASIVE LOCATION;  Service: Cardiology;  Laterality: N/A;    COLONOSCOPY  05/17/2013    eh, ih, tort, sig tics    COLONOSCOPY N/A 05/10/2019    Non-thrombosed external hemorrhoids found on perianal exam, Diverticulosis, Tortuous colon, One 5 mm polyp in the mid ascending colon, IH. Path: Tubular adenoma.     COLONOSCOPY N/A 09/24/2022    Procedure: COLONOSCOPY to cecum and TI with argon plasma coagulation.;  Surgeon: Bruce Black MD;  Location: [facility] ENDOSCOPY;  Service: Gastroenterology;  Laterality: N/A;  pre- GI bleeding  post- radiation proctitis, diverticulosis    ENDOSCOPY  03/19/2015    z line irreg, hh    ENDOSCOPY N/A 09/24/2022    Procedure: ESOPHAGOGASTRODUODENOSCOPY;  Surgeon: Bruce Black MD;  Location: [facility] ENDOSCOPY;  Service: Gastroenterology;  Laterality: N/A;  pre- GI bleeding  post- esophagitis, hiatal hernia    HIP OPEN REDUCTION Right 01/17/2018    Procedure: HIP OPEN REDUCTION INTERNAL FIXATION WITH DYNAMIC HIP SCREW;  Surgeon: Les Black MD;  Location: [facility] OR;  Service:     SPINE SURGERY      TONSILLECTOMY      TOTAL HIP ARTHROPLASTY REVISION Right 09/23/2019    Procedure: HIP  REVISION RIGHT;  Surgeon: Isauro Warner MD;  Location: [facility]  OR;  Service: Orthopedics       SLP Recommendation and Plan  SLP Swallowing Diagnosis: swallow WFL/no suspected pharyngeal impairment (11/25/24 1614)  SLP Diet Recommendation: soft to chew textures, whole, thin liquids (11/25/24 1614)  Recommended Precautions and Strategies: upright posture during/after eating, general aspiration precautions (11/25/24 1614)  SLP Rec. for Method of Medication Administration: as tolerated (11/25/24 1614)     Monitor for Signs of Aspiration: notify SLP if any concerns (11/25/24 1614)  Recommended Diagnostics: No further SLP services recommended (11/25/24 1614)  Swallow Criteria for Skilled Therapeutic Interventions Met: baseline status (11/25/24 1614)  Anticipated Discharge Disposition (SLP): unknown (11/25/24 1614)     Therapy Frequency (Swallow): evaluation only (11/25/24 1614)     Oral Care Recommendations: Oral Care BID/PRN, Toothbrush (11/25/24 1614)                                        Outcome Evaluation: Clinical swallow eval completed. Oropharyngeal swallow appears functional. Vertical mastication pattern, likely due to missing upper molars. Pt interested in soft-to-chew textures to assist with chewing. No acute needs identified at this time. SLP will sign off. If concerned about silent aspiration, please order video swallow study. Thank you very much for the courtesy of this consultation! Diet texture recommendation: Soft-to-chew, thin liquids. Meds: Per patient preference, as tolerated. Aspiration precautions: Fully upright for all oral intake.      SWALLOW EVALUATION (Last 72 Hours)       SLP Adult Swallow Evaluation       Row Name 11/25/24 1614       Rehab Evaluation    Document Type evaluation  -BB    Subjective Information no complaints  -BB    Patient Observations alert;cooperative;agree to therapy  -BB    Patient Effort good  -BB    Symptoms Noted During/After Treatment none  -BB       General Information    Patient Profile Reviewed yes  -BB    Pertinent History Of  "Current Problem 85 yo under observation for COPD w acute exacerbation. ST consult for swallowing. Pt reported that he sometimes has trouble swallowing. Pt reports that he seldom can tend to have some food stick in his throat, followed by water; he reports that he clears his throat and swallows again, and it goes down. He reports that episodes are very infrequent. Pt reports that he had video swallow study completed at this facility in the past. Per previous clinical swallow eval note: \"Patient seen for clinical swallow assessment. Denies current dysphagia symptoms. Pt unable to recall recs from previous VFSS, but could demonstrate effortful swallow exercise. Oral mech exam was remarkable for some missing dentition. No overt s/s of aspiration with ice, single drinks thins via spoon/cup/straw, puree, mixed mech soft, or regular solids. VFSS 11/29/22 with recs for mech soft/honey, but that was s/p intubation. VFSS 8/4/22 with recs for regular and thins. Single drinks with liquid wash after regular solids. SLP recs continuing regular and thins, single drinks with liquid wash after regular solids. Meds whole in puree. SLP to sign off, please order VFSS if concerned for silent aspiration.\"  -BB    Current Method of Nutrition regular textures;thin liquids  -BB    Precautions/Limitations, Vision WFL;for purposes of eval  -BB    Precautions/Limitations, Hearing WFL;for purposes of eval  -BB    Prior Level of Function-Communication WFL  -BB    Prior Level of Function-Swallowing no diet consistency restrictions  -BB    Plans/Goals Discussed with patient;agreed upon  -BB    Barriers to Rehab none identified  -BB    Patient's Goals for Discharge --  breathe better  -BB       Pain    Pretreatment Pain Rating 1/10  -BB    Posttreatment Pain Rating 1/10  -BB    Pain Location back  -BB    Pain Management Interventions diversional activity provided;other (see comments)  pt declined contacting RN  -BB       Oral Motor Structure and " Function    Dentition Assessment natural, present and adequate;missing teeth  missing upper molars  -BB    Secretion Management WNL/WFL  -BB    Mucosal Quality moist, healthy  -BB       Oral Musculature and Cranial Nerve Assessment    Oral Motor General Assessment WFL  -BB       General Eating/Swallowing Observations    Respiratory Support Currently in Use nasal cannula  -BB    Eating/Swallowing Skills self-fed  -BB    Positioning During Eating other (see comments)  upright at edge of bed  -BB    Utensils Used spoon;straw  -BB    Consistencies Trialed regular textures;soft to chew textures;chopped;mixed consistency;thin liquids  -BB       Clinical Swallow Eval    Clinical Swallow Evaluation Summary Clinical swallow eval completed. No family present.     Cognition: A/Ox4. Pt able to follow basic multi-step commands.   Expressive communication: appropriate.   Voice: Vocal quality and loudness appear good.   Cough: Not elicited.   Affect: Pleasant.   Speech: Intelligible.   Bulbar mech exam: Integrity of cranial nerves V, VII, IX/X and XII appear grossly intact.   Dysphagia symptoms: see note above.   RN reports no observable issues w oral intake.     Patient agreeable to PO trials.   Dysphagia signs: No overt clinical signs of aspiration observed across trials. Vertical mastication pattern, likely due to lack of upper molars.       Assessment / Plan: Clinical swallow eval completed. Oropharyngeal swallow appears functional. Vertical mastication pattern, likely due to missing upper molars. Pt interested in soft-to-chew textures to assist with chewing. No acute needs identified at this time. SLP will sign off. If concerned about silent aspiration, please order video swallow study. Thank you very much for the courtesy of this consultation!  Diet texture recommendation: Soft-to-chew, thin liquids. Meds: Per patient preference, as tolerated. Aspiration precautions: Fully upright for all oral intake.  -BB       SLP  Evaluation Clinical Impression    SLP Swallowing Diagnosis swallow WFL/no suspected pharyngeal impairment  -BB    Swallow Criteria for Skilled Therapeutic Interventions Met baseline status  -BB       Recommendations    Therapy Frequency (Swallow) evaluation only  -BB    SLP Diet Recommendation soft to chew textures;whole;thin liquids  -BB    Recommended Diagnostics No further SLP services recommended  -BB    Recommended Precautions and Strategies upright posture during/after eating;general aspiration precautions  -BB    Oral Care Recommendations Oral Care BID/PRN;Toothbrush  -BB    SLP Rec. for Method of Medication Administration as tolerated  -BB    Monitor for Signs of Aspiration notify SLP if any concerns  -BB    Anticipated Discharge Disposition (SLP) unknown  -BB              User Key  (r) = Recorded By, (t) = Taken By, (c) = Cosigned By      Initials Name Effective Dates    Missael Domínguez SLP 02/19/23 -                     EDUCATION  The patient has been educated in the following areas:   Dysphagia (Swallowing Impairment).                Time Calculation:    Time Calculation- SLP       Row Name 11/25/24 1624             Time Calculation- SLP    SLP Start Time 1530  -BB      SLP Stop Time 1630  -BB      SLP Time Calculation (min) 60 min  -BB      SLP Received On 11/25/24  -BB         Untimed Charges    SLP Eval/Re-eval  ST Eval Oral Pharyng Swallow - 99808  -BB      91116-WB Eval Oral Pharyng Swallow Minutes 60  -BB         Total Minutes    Untimed Charges Total Minutes 60  -BB       Total Minutes 60  -BB                User Key  (r) = Recorded By, (t) = Taken By, (c) = Cosigned By      Initials Name Provider Type    Missael Domínguez SLP Speech and Language Pathologist                    Therapy Charges for Today       Code Description Service Date Service Provider Modifiers Qty    [id number]  ST EVAL ORAL PHARYNG SWALLOW 4 11/25/2024 Missael Castro SLP GN 1                 BILL Tinoco  11/25/2024

## 2024-11-25 NOTE — ED NOTES
Patient to ED via EMS from [facility] for the call of SOA starting this afternoon. Per ems, he has wheezing and rhonchi in all fields. Hx of COPD. Patient wears 2L at baseline. Patient is alert and oriented x4. EMS gave one duoneb treatment and one additional albuterol.

## 2024-11-25 NOTE — PLAN OF CARE
"Goal Outcome Evaluation:  Plan of Care Reviewed With: patient        Progress: no change  Outcome Evaluation: Oriented, forgetful.  Denies shortness of breath. Able to wean off oxygen and leave on room air (was later put back on by RT). He reported redness/irriation inside groin - barrier cream put on.  Gets frustrated easily but it seems to be related to his hearing deficit and not hearing what staff is telling him.  Reported \"vibrations\" of feet - MD notified and RT meds changed.                              "

## 2024-11-25 NOTE — CONSULTS
visited pt this afternoon on a rounding visit.  In the room, pt informed this  that he would like to have a  visit him for communion.  Pt states he affiliated with the [facility].  [facility] hotline initiated.  Message left for Deacon Cortés.

## 2024-11-26 LAB
ANION GAP SERPL CALCULATED.3IONS-SCNC: 12 MMOL/L (ref 5–15)
BUN SERPL-MCNC: 16 MG/DL (ref 8–23)
BUN/CREAT SERPL: 18.2 (ref 7–25)
CALCIUM SPEC-SCNC: 8.8 MG/DL (ref 8.6–10.5)
CHLORIDE SERPL-SCNC: 105 MMOL/L (ref 98–107)
CO2 SERPL-SCNC: 23 MMOL/L (ref 22–29)
CREAT SERPL-MCNC: 0.88 MG/DL (ref 0.76–1.27)
DEPRECATED RDW RBC AUTO: 45.5 FL (ref 37–54)
EGFRCR SERPLBLD CKD-EPI 2021: 83.7 ML/MIN/1.73
ERYTHROCYTE [DISTWIDTH] IN BLOOD BY AUTOMATED COUNT: 16.1 % (ref 12.3–15.4)
FERRITIN SERPL-MCNC: 27.1 NG/ML (ref 30–400)
GLUCOSE SERPL-MCNC: 135 MG/DL (ref 65–99)
HCT VFR BLD AUTO: 31 % (ref 37.5–51)
HGB BLD-MCNC: 9.3 G/DL (ref 13–17.7)
IRON 24H UR-MRATE: 20 MCG/DL (ref 59–158)
IRON SATN MFR SERPL: 5 % (ref 20–50)
MAGNESIUM SERPL-MCNC: 2.4 MG/DL (ref 1.6–2.4)
MCH RBC QN AUTO: 23.1 PG (ref 26.6–33)
MCHC RBC AUTO-ENTMCNC: 30 G/DL (ref 31.5–35.7)
MCV RBC AUTO: 77.1 FL (ref 79–97)
PLATELET # BLD AUTO: 312 10*3/MM3 (ref 140–450)
PMV BLD AUTO: 9.7 FL (ref 6–12)
POTASSIUM SERPL-SCNC: 3.7 MMOL/L (ref 3.5–5.2)
RBC # BLD AUTO: 4.02 10*6/MM3 (ref 4.14–5.8)
SODIUM SERPL-SCNC: 140 MMOL/L (ref 136–145)
TIBC SERPL-MCNC: 381 MCG/DL (ref 298–536)
TRANSFERRIN SERPL-MCNC: 256 MG/DL (ref 200–360)
WBC NRBC COR # BLD AUTO: 7.56 10*3/MM3 (ref 3.4–10.8)

## 2024-11-26 PROCEDURE — 25010000002 ENOXAPARIN PER 10 MG: Performed by: INTERNAL MEDICINE

## 2024-11-26 PROCEDURE — 84466 ASSAY OF TRANSFERRIN: CPT | Performed by: STUDENT IN AN ORGANIZED HEALTH CARE EDUCATION/TRAINING PROGRAM

## 2024-11-26 PROCEDURE — 82728 ASSAY OF FERRITIN: CPT | Performed by: STUDENT IN AN ORGANIZED HEALTH CARE EDUCATION/TRAINING PROGRAM

## 2024-11-26 PROCEDURE — 97530 THERAPEUTIC ACTIVITIES: CPT

## 2024-11-26 PROCEDURE — 97166 OT EVAL MOD COMPLEX 45 MIN: CPT

## 2024-11-26 PROCEDURE — 97162 PT EVAL MOD COMPLEX 30 MIN: CPT

## 2024-11-26 PROCEDURE — 96376 TX/PRO/DX INJ SAME DRUG ADON: CPT

## 2024-11-26 PROCEDURE — 83540 ASSAY OF IRON: CPT | Performed by: STUDENT IN AN ORGANIZED HEALTH CARE EDUCATION/TRAINING PROGRAM

## 2024-11-26 PROCEDURE — 96372 THER/PROPH/DIAG INJ SC/IM: CPT

## 2024-11-26 PROCEDURE — 94664 DEMO&/EVAL PT USE INHALER: CPT

## 2024-11-26 PROCEDURE — 94799 UNLISTED PULMONARY SVC/PX: CPT

## 2024-11-26 PROCEDURE — 80048 BASIC METABOLIC PNL TOTAL CA: CPT | Performed by: INTERNAL MEDICINE

## 2024-11-26 PROCEDURE — 25010000002 METHYLPREDNISOLONE PER 40 MG: Performed by: INTERNAL MEDICINE

## 2024-11-26 PROCEDURE — 97535 SELF CARE MNGMENT TRAINING: CPT

## 2024-11-26 PROCEDURE — 85027 COMPLETE CBC AUTOMATED: CPT | Performed by: INTERNAL MEDICINE

## 2024-11-26 PROCEDURE — G0378 HOSPITAL OBSERVATION PER HR: HCPCS

## 2024-11-26 PROCEDURE — 94761 N-INVAS EAR/PLS OXIMETRY MLT: CPT

## 2024-11-26 PROCEDURE — 63710000001 PREDNISONE PER 1 MG: Performed by: INTERNAL MEDICINE

## 2024-11-26 PROCEDURE — 83735 ASSAY OF MAGNESIUM: CPT | Performed by: INTERNAL MEDICINE

## 2024-11-26 RX ORDER — ALBUTEROL SULFATE 0.83 MG/ML
2.5 SOLUTION RESPIRATORY (INHALATION)
Status: DISCONTINUED | OUTPATIENT
Start: 2024-11-26 | End: 2024-11-27 | Stop reason: HOSPADM

## 2024-11-26 RX ORDER — PREDNISONE 20 MG/1
20 TABLET ORAL
Status: DISCONTINUED | OUTPATIENT
Start: 2024-11-26 | End: 2024-11-27 | Stop reason: HOSPADM

## 2024-11-26 RX ADMIN — GABAPENTIN 300 MG: 300 CAPSULE ORAL at 21:01

## 2024-11-26 RX ADMIN — FUROSEMIDE 20 MG: 20 TABLET ORAL at 17:24

## 2024-11-26 RX ADMIN — BUDESONIDE 0.5 MG: 0.5 INHALANT RESPIRATORY (INHALATION) at 20:46

## 2024-11-26 RX ADMIN — SUCRALFATE 1 G: 1 TABLET ORAL at 09:14

## 2024-11-26 RX ADMIN — SUCRALFATE 1 G: 1 TABLET ORAL at 13:01

## 2024-11-26 RX ADMIN — TIOTROPIUM BROMIDE INHALATION SPRAY 2 PUFF: 3.12 SPRAY, METERED RESPIRATORY (INHALATION) at 07:22

## 2024-11-26 RX ADMIN — GUAIFENESIN 1200 MG: 600 TABLET, EXTENDED RELEASE ORAL at 21:01

## 2024-11-26 RX ADMIN — SUCRALFATE 1 G: 1 TABLET ORAL at 21:01

## 2024-11-26 RX ADMIN — Medication 4 ML: at 07:09

## 2024-11-26 RX ADMIN — ALBUTEROL SULFATE 2.5 MG: 2.5 SOLUTION RESPIRATORY (INHALATION) at 12:48

## 2024-11-26 RX ADMIN — BUDESONIDE 0.5 MG: 0.5 INHALANT RESPIRATORY (INHALATION) at 07:14

## 2024-11-26 RX ADMIN — Medication 10 ML: at 09:14

## 2024-11-26 RX ADMIN — ARFORMOTEROL TARTRATE 15 MCG: 15 SOLUTION RESPIRATORY (INHALATION) at 20:46

## 2024-11-26 RX ADMIN — METHYLPREDNISOLONE SODIUM SUCCINATE 40 MG: 40 INJECTION, POWDER, FOR SOLUTION INTRAMUSCULAR; INTRAVENOUS at 09:13

## 2024-11-26 RX ADMIN — Medication 10 ML: at 21:01

## 2024-11-26 RX ADMIN — ARFORMOTEROL TARTRATE 15 MCG: 15 SOLUTION RESPIRATORY (INHALATION) at 07:09

## 2024-11-26 RX ADMIN — SUCRALFATE 1 G: 1 TABLET ORAL at 17:24

## 2024-11-26 RX ADMIN — GUAIFENESIN 1200 MG: 600 TABLET, EXTENDED RELEASE ORAL at 09:14

## 2024-11-26 RX ADMIN — GABAPENTIN 300 MG: 300 CAPSULE ORAL at 17:23

## 2024-11-26 RX ADMIN — FUROSEMIDE 20 MG: 20 TABLET ORAL at 09:13

## 2024-11-26 RX ADMIN — PANTOPRAZOLE SODIUM 40 MG: 40 TABLET, DELAYED RELEASE ORAL at 09:14

## 2024-11-26 RX ADMIN — PREDNISONE 20 MG: 20 TABLET ORAL at 13:01

## 2024-11-26 RX ADMIN — FLUOXETINE HYDROCHLORIDE 20 MG: 20 CAPSULE ORAL at 09:14

## 2024-11-26 RX ADMIN — GABAPENTIN 300 MG: 300 CAPSULE ORAL at 09:14

## 2024-11-26 RX ADMIN — Medication 4 ML: at 19:22

## 2024-11-26 RX ADMIN — ALBUTEROL SULFATE 2.5 MG: 2.5 SOLUTION RESPIRATORY (INHALATION) at 19:21

## 2024-11-26 RX ADMIN — ENOXAPARIN SODIUM 40 MG: 100 INJECTION SUBCUTANEOUS at 13:01

## 2024-11-26 NOTE — THERAPY EVALUATION
Patient Name: Tony Leonard  : 1940    MRN: [id number]                              Today's Date: 2024       Admit Date: 2024    Visit Dx:     ICD-10-CM ICD-9-CM   1. COPD with acute exacerbation  J44.1 491.21   2. Chronic anemia  D64.9 285.9     Patient Active Problem List   Diagnosis    Thrush, oral    ADD (attention deficit disorder)    Hemorrhoids    Bronchiectasis with acute lower respiratory infection    Diverticul disease small and large intestine, no perforati or abscess    Benign non-nodular prostatic hyperplasia without lower urinary tract symptoms    Gastroesophageal reflux disease    Malaise and fatigue    Environmental allergies    Hemoptysis    Dyslipidemia    Primary osteoarthritis involving multiple joints    Pneumonia of right lower lobe due to infectious organism    Abnormal EKG    COPD (chronic obstructive pulmonary disease)    Mild malnutrition    Acute on chronic respiratory failure with hypoxia    Fracture, intertrochanteric, right femur, closed, initial encounter    Chronic diastolic CHF (congestive heart failure)    Hematoma of frontal scalp    Postoperative anemia due to acute blood loss    Urinary retention    Hemorrhagic disorder due to circulating anticoagulants    History of fracture of right hip    Closed fracture of right hip with routine healing    Chronic low back pain with sciatica    Change in bowel function    Rectal bleeding    Prostate cancer    On home oxygen therapy    Acute viral bronchitis    Peripheral neuropathy    Plantar fasciitis    HTN (hypertension)    COPD exacerbation    Bilateral lower extremity edema    Microscopic hematuria    Acute midline low back pain with right-sided sciatica    Spinal stenosis, lumbar region with neurogenic claudication    Compression deformity of vertebra    Rectal bleed    Esophagitis    Angiectasia    Hiatal hernia    Overweight (BMI 25.0-29.9)    Leukocytosis    Bilateral hip pain    Elevated troponin level not due  myocardial infarction    Nocturnal hypoxia    Pneumonia of right upper lobe due to infectious organism    NSTEMI (non-ST elevated myocardial infarction)    Chronic respiratory failure with hypoxia    Paroxysmal atrial fibrillation    Acute exacerbation of chronic obstructive pulmonary disease (COPD)    Anemia    Non-compliant patient    Hypokalemia    Spondylolisthesis of lumbar region    Elevated troponin    Rhabdomyolysis    Multiple contusions    Fall    Dizziness    Contusion of hip    Pulmonary embolism    COPD with acute exacerbation     Past Medical History:   Diagnosis Date    ADHD (attention deficit hyperactivity disorder)     Bronchiectasis     CHF (congestive heart failure)     Colon polyp     COPD (chronic obstructive pulmonary disease)     Diverticulosis     Hard of hearing     On home oxygen therapy     2 LITERS    Pneumonia     Prostate cancer 04/22/2019    Spinal stenosis, lumbar region with neurogenic claudication 08/02/2022     Past Surgical History:   Procedure Laterality Date    BRONCHOSCOPY N/A 04/30/2016    Procedure: BRONCHOSCOPY with BAL of right lower lobe and left  lower lobe.  ;  Surgeon: Nando Diaz MD;  Location: [facility] ENDOSCOPY;  Service:     BRONCHOSCOPY N/A 04/28/2017    Procedure: BRONCHOSCOPY;  Surgeon: Katharina Salter MD;  Location: [facility] ENDOSCOPY;  Service:     BRONCHOSCOPY Bilateral 06/29/2017    Procedure: BRONCHOSCOPY WITH WASHINGS;  Surgeon: Katharina Salter MD;  Location: [facility] ENDOSCOPY;  Service:     BRONCHOSCOPY N/A 01/22/2018    Procedure: BRONCHOSCOPY AT BEDSIDE with BAL;  Surgeon: Katharina Salter MD;  Location: [facility] ENDOSCOPY;  Service:     BRONCHOSCOPY N/A 01/30/2018    Procedure: BRONCHOSCOPY with BAL and washing;  Surgeon: Shreyas iWld MD;  Location: [facility] ENDOSCOPY;  Service:     BRONCHOSCOPY Bilateral 04/24/2018    Procedure: BRONCHOSCOPY WITH WASHING;  Surgeon: Katharina Salter MD;  Location: [facility] ENDOSCOPY;  Service: Pulmonary    BRONCHOSCOPY N/A  12/28/2019    Procedure: BRONCHOSCOPY with bilateral washing;  Surgeon: Katharina Salter MD;  Location: [facility] ENDOSCOPY;  Service: Pulmonary    BRONCHOSCOPY Bilateral 01/04/2023    Procedure: BRONCHOSCOPY with lavage;  Surgeon: Katharina Salter MD;  Location: [facility] ENDOSCOPY;  Service: Pulmonary;  Laterality: Bilateral;  mucus plugging    CARDIAC CATHETERIZATION N/A 01/29/2023    Procedure: Left Heart Cath;  Surgeon: Johnnie Ang MD;  Location: [facility] CATH INVASIVE LOCATION;  Service: Cardiology;  Laterality: N/A;    CARDIAC CATHETERIZATION N/A 01/29/2023    Procedure: Coronary angiography;  Surgeon: Johnnie Ang MD;  Location: [facility] CATH INVASIVE LOCATION;  Service: Cardiology;  Laterality: N/A;    COLONOSCOPY  05/17/2013    eh, ih, tort, sig tics    COLONOSCOPY N/A 05/10/2019    Non-thrombosed external hemorrhoids found on perianal exam, Diverticulosis, Tortuous colon, One 5 mm polyp in the mid ascending colon, IH. Path: Tubular adenoma.     COLONOSCOPY N/A 09/24/2022    Procedure: COLONOSCOPY to cecum and TI with argon plasma coagulation.;  Surgeon: Bruce Black MD;  Location: [facility] ENDOSCOPY;  Service: Gastroenterology;  Laterality: N/A;  pre- GI bleeding  post- radiation proctitis, diverticulosis    ENDOSCOPY  03/19/2015    z line irreg, hh    ENDOSCOPY N/A 09/24/2022    Procedure: ESOPHAGOGASTRODUODENOSCOPY;  Surgeon: Bruce Black MD;  Location: [facility] ENDOSCOPY;  Service: Gastroenterology;  Laterality: N/A;  pre- GI bleeding  post- esophagitis, hiatal hernia    HIP OPEN REDUCTION Right 01/17/2018    Procedure: HIP OPEN REDUCTION INTERNAL FIXATION WITH DYNAMIC HIP SCREW;  Surgeon: Les Black MD;  Location: [facility] MAIN OR;  Service:     SPINE SURGERY      TONSILLECTOMY      TOTAL HIP ARTHROPLASTY REVISION Right 09/23/2019    Procedure: HIP  REVISION RIGHT;  Surgeon: Isauro Warner MD;  Location: [facility] MAIN OR;  Service: Orthopedics      General Information       Row Name  11/26/24 1542          OT Time and Intention    Document Type evaluation  -KR     Mode of Treatment individual therapy;occupational therapy  -KR     Patient Effort good  -KR       Row Name 11/26/24 1542          General Information    Patient Profile Reviewed yes  -KR     Prior Level of Function mod assist:;ADL's;transfer;min assist:  transfers to w/c then to toilet with Ax1.  -KR     Existing Precautions/Restrictions fall  -KR       Row Name 11/26/24 1542          Living Environment    People in Home facility resident  -KR       Row Name 11/26/24 1542          Cognition    Orientation Status (Cognition) oriented x 3  -KR       Row Name 11/26/24 1542          Safety Issues/Impairments Affecting Functional Mobility    Impairments Affecting Function (Mobility) strength;endurance/activity tolerance;pain;balance  -KR               User Key  (r) = Recorded By, (t) = Taken By, (c) = Cosigned By      Initials Name Provider Type    KR Dejah Jimenez OT Occupational Therapist                     Mobility/ADL's       Row Name 11/26/24 1542          Bed Mobility    Supine-Sit [place] (Bed Mobility) minimum assist (75% patient effort)  -KR     Sit-Supine [place] (Bed Mobility) moderate assist (50% patient effort)  -KR       Row Name 11/26/24 1542          Transfers    Transfers sit-stand transfer  -KR       Row Name 11/26/24 1542          Sit-Stand Transfer    Sit-Stand [place] (Transfers) moderate assist (50% patient effort);1 person assist  -KR     Assistive Device (Sit-Stand Transfers) walker, front-wheeled  -KR       Row Name 11/26/24 1542          Functional Mobility    Functional Mobility- Comment side steps towards the HOB with Ax1 and use of rwx  -KR       Row Name 11/26/24 1542          Activities of Daily Living    BADL Assessment/Intervention toileting  -KR       Row Name 11/26/24 1542          Toileting Assessment/Training    Comment, (Toileting) used urinal seated EOB with CGA/SBA. Anticipate mod A  if pt transfered to the toilet for clothing management and hygiene  -KR               User Key  (r) = Recorded By, (t) = Taken By, (c) = Cosigned By      Initials Name Provider Type    Dejah Salazar OT Occupational Therapist                   Obj/Interventions       Row Name 11/26/24 1543          Range of Motion Comprehensive    General Range of Motion bilateral upper extremity ROM WFL  -KR       [facility] Name 11/26/24 1543          Strength Comprehensive (MMT)    General Manual Muscle Testing (MMT) Assessment upper extremity strength deficits identified  -KR       [facility] Name 11/26/24 1543          Balance    Dynamic Sitting Balance contact guard;minimal assist  -KR     Static Standing Balance minimal assist  -KR     Balance Interventions sitting;standing;occupation based/functional task  -KR               User Key  (r) = Recorded By, (t) = Taken By, (c) = Cosigned By      Initials Name Provider Type    Dejah Salazar OT Occupational Therapist                   Goals/Plan       Row Name 11/26/24 1546          Transfer Goal 1 (OT)    Activity/Assistive Device (Transfer Goal 1, OT) transfers, all  -KR     [place] Level/Cues Needed (Transfer Goal 1, OT) minimum assist (75% or more patient effort)  -KR     Time Frame (Transfer Goal 1, OT) 2 weeks;short term goal (STG)  -KR     Progress/Outcome (Transfer Goal 1, OT) goal ongoing  -KR       [facility] Name 11/26/24 1546          Bathing Goal 1 (OT)    Activity/Device (Bathing Goal 1, OT) bathing skills, all  -KR     [place] Level/Cues Needed (Bathing Goal 1, OT) minimum assist (75% or more patient effort)  -KR     Time Frame (Bathing Goal 1, OT) short term goal (STG);2 weeks  -KR     Progress/Outcomes (Bathing Goal 1, OT) goal ongoing  -KR       Row Name 11/26/24 1546          Dressing Goal 1 (OT)    Activity/Device (Dressing Goal 1, OT) dressing skills, all  -KR     [place]/Cues Needed (Dressing Goal 1, OT) moderate assist (50-74% patient effort)  -KR     Time  Frame (Dressing Goal 1, OT) short term goal (STG);2 weeks  -KR     Progress/Outcome (Dressing Goal 1, OT) goal ongoing  -KR       Row Name 11/26/24 1546          Toileting Goal 1 (OT)    Activity/Device (Toileting Goal 1, OT) toileting skills, all  -KR     [place] Level/Cues Needed (Toileting Goal 1, OT) moderate assist (50-74% patient effort)  -KR     Time Frame (Toileting Goal 1, OT) short term goal (STG);2 weeks  -KR     Progress/Outcome (Toileting Goal 1, OT) goal ongoing  -KR       Row Name 11/26/24 1546          Therapy Assessment/Plan (OT)    Planned Therapy Interventions (OT) activity tolerance training;BADL retraining;adaptive equipment training;patient/caregiver education/training;ROM/therapeutic exercise;strengthening exercise;occupation/activity based interventions;functional balance retraining;transfer/mobility retraining  -KR               User Key  (r) = Recorded By, (t) = Taken By, (c) = Cosigned By      Initials Name Provider Type    Dejah Salazar OT Occupational Therapist                   Clinical Impression       Row Name 11/26/24 1544          Pain Assessment    Pretreatment Pain Rating 0/10 - no pain  -KR     Posttreatment Pain Rating 0/10 - no pain  -KR       Row Name 11/26/24 1546          Plan of Care Review    Plan of Care Reviewed With patient  -KR     Outcome Evaluation Pt seen for OT hiren this afternoon. Admitted from [facility] for COPD acute exacerbation. He reports transferring to a w/c and then w/c to toilet with assist. Today he performed bed mobility with min Ax1, sat EOB and used the urinal with SBA and stood with mod Ax1 and took side steps towards the HOB with increased time. Unsteady with steps towards HOB. Once seated EOB again he performed all grooming tasks with CGA-min A. Pt tended to lean L as he fatigued and wanted to rest his L elbow on the bed. Pt continues to benefit from skilled OT to address deficits. Anticipate return to facility.  -KR        Row Name 11/26/24 1544          Therapy Assessment/Plan (OT)    Therapy Frequency (OT) 5 times/wk  -KR       Row Name 11/26/24 1544          Therapy Plan Review/Discharge Plan (OT)    Anticipated Discharge Disposition (OT) extended care [facility]  -KR       Row Name 11/26/24 1544          Vital Signs    Pre Patient Position Supine  -KR     Intra Patient Position Standing  -KR     Post Patient Position Supine  -KR       Row Name 11/26/24 1544          Positioning and Restraints    Pre-Treatment Position in bed  -KR     Post Treatment Position bed  -KR     In Bed notified nsg;supine;call light within reach;encouraged to call for assist;exit alarm on  -KR               User Key  (r) = Recorded By, (t) = Taken By, (c) = Cosigned By      Initials Name Provider Type    Dejah Salazar OT Occupational Therapist                   Outcome Measures       Row Name 11/26/24 1546          How much help from another is currently needed...    Putting on and taking off regular lower body clothing? 1  -KR     Bathing (including washing, rinsing, and drying) 2  -KR     Toileting (which includes using toilet bed pan or urinal) 2  -KR     Putting on and taking off regular upper body clothing 3  -KR     Taking care of personal grooming (such as brushing teeth) 3  -KR     Eating meals 3  -KR     AM-PAC 6 Clicks Score (OT) 14  -KR       Row Name 11/26/24 0924          How much help from another person do you currently need...    Turning from your back to your side while in flat bed without using bedrails? 4  -CH (r) PB (t) CH (c)     Moving from lying on back to sitting on the side of a flat bed without bedrails? 3  -CH (r) PB (t) CH (c)     Moving to and from a bed to a chair (including a wheelchair)? 2  -CH (r) PB (t) CH (c)     Standing up from a chair using your arms (e.g., wheelchair, bedside chair)? 2  -CH (r) PB (t) CH (c)     Climbing 3-5 steps with a railing? 1  -CH (r) PB (t) CH (c)     To walk in hospital room? 2  -CH (r)  PB (t) CH (c)     AM-PAC 6 Clicks Score (PT) 14  -CH (r) PB (t)     Highest Level of Mobility Goal 4 --> Transfer to chair/commode  -CH (r) PB (t)       Row Name 11/26/24 1546 11/26/24 0958       Functional Assessment    Outcome Measure Options AM-PAC 6 Clicks Daily Activity (OT)  -KR AM-PAC 6 Clicks Basic Mobility (PT)  -CH (r) PB (t) CH (c)              User Key  (r) = Recorded By, (t) = Taken By, (c) = Cosigned By      Initials Name Provider Type    CH Courtney Capps, PT Physical Therapist    Dejah Salazar, OT Occupational Therapist    Palmira Gonsalez, PT Student PT Student                    Occupational Therapy Education       Title: PT OT SLP Therapies (In Progress)       Topic: Occupational Therapy (Done)       Point: ADL training (Done)       Description:   Instruct learner(s) on proper safety adaptation and remediation techniques during self care or transfers.   Instruct in proper use of assistive devices.                  Learning Progress Summary            Patient Acceptance, E, VU,DU by  at 11/26/2024 1547                      Point: Home exercise program (Done)       Description:   Instruct learner(s) on appropriate technique for monitoring, assisting and/or progressing therapeutic exercises/activities.                  Learning Progress Summary            Patient Acceptance, E, VU,DU by  at 11/26/2024 1547                      Point: Precautions (Done)       Description:   Instruct learner(s) on prescribed precautions during self-care and functional transfers.                  Learning Progress Summary            Patient Acceptance, E, VU,DU by  at 11/26/2024 1547                                      User Key       Initials Effective Dates Name Provider Type [facility] 06/25/24 -  Dejah Jimenez, OT Occupational Therapist OT                  OT Recommendation and Plan  Planned Therapy Interventions (OT): activity tolerance training, BADL retraining, adaptive equipment training,  patient/caregiver education/training, ROM/therapeutic exercise, strengthening exercise, occupation/activity based interventions, functional balance retraining, transfer/mobility retraining  Therapy Frequency (OT): 5 times/wk  Plan of Care Review  Plan of Care Reviewed With: patient  Outcome Evaluation: Pt seen for OT eval this afternoon. Admitted from [facility] for COPD acute exacerbation. He reports transferring to a w/c and then w/c to toilet with assist. Today he performed bed mobility with min Ax1, sat EOB and used the urinal with SBA and stood with mod Ax1 and took side steps towards the HOB with increased time. Unsteady with steps towards HOB. Once seated EOB again he performed all grooming tasks with CGA-min A. Pt tended to lean L as he fatigued and wanted to rest his L elbow on the bed. Pt continues to benefit from skilled OT to address deficits. Anticipate return to facility.     Time Calculation:         Time Calculation- OT       Row Name 11/26/24 1547             Time Calculation- OT    OT Start Time 1418  -KR      OT Stop Time 1441  -KR      OT Time Calculation (min) 23 min  -KR      OT Received On 11/26/24  -KR      OT - Next Appointment 11/27/24  -KR      OT Goal Re-Cert Due Date 12/10/24  -KR         Timed Charges    94655 - OT Self Care/Mgmt Minutes 10  -KR         Untimed Charges    OT Eval/Re-eval Minutes 13  -KR         Total Minutes    Timed Charges Total Minutes 10  -KR      Untimed Charges Total Minutes 13  -KR       Total Minutes 23  -KR                User Key  (r) = Recorded By, (t) = Taken By, (c) = Cosigned By      Initials Name Provider Type    KR Dejah Jimenez OT Occupational Therapist                  Therapy Charges for Today       Code Description Service Date Service Provider Modifiers Qty    73740630190 HC OT SELF CARE/MGMT/TRAIN EA 15 MIN 11/26/2024 Dejah Jimenez OT GO 1    14047573334 HC OT EVAL MOD COMPLEXITY 3 11/26/2024 Dejah Jimenez OT GO 1                  Dejah Jimenez, OT  11/26/2024

## 2024-11-26 NOTE — PLAN OF CARE
Goal Outcome Evaluation:  Plan of Care Reviewed With: patient           Outcome Evaluation: Pt seen for OT hiren this afternoon. Admitted from [facility] for COPD acute exacerbation. He reports transferring to a w/c and then w/c to toilet with assist. Today he performed bed mobility with min Ax1, sat EOB and used the urinal with SBA and stood with mod Ax1 and took side steps towards the HOB with increased time. Unsteady with steps towards HOB. Once seated EOB again he performed all grooming tasks with CGA-min A. Pt tended to lean L as he fatigued and wanted to rest his L elbow on the bed. Pt continues to benefit from skilled OT to address deficits. Anticipate return to facility.    Anticipated Discharge Disposition (OT): extended care facility

## 2024-11-26 NOTE — THERAPY EVALUATION
Patient Name: Tony Leonard  : 1940    MRN: [id number]                              Today's Date: 2024       Admit Date: 2024    Visit Dx:     ICD-10-CM ICD-9-CM   1. COPD with acute exacerbation  J44.1 491.21   2. Chronic anemia  D64.9 285.9     Patient Active Problem List   Diagnosis    Thrush, oral    ADD (attention deficit disorder)    Hemorrhoids    Bronchiectasis with acute lower respiratory infection    Diverticul disease small and large intestine, no perforati or abscess    Benign non-nodular prostatic hyperplasia without lower urinary tract symptoms    Gastroesophageal reflux disease    Malaise and fatigue    Environmental allergies    Hemoptysis    Dyslipidemia    Primary osteoarthritis involving multiple joints    Pneumonia of right lower lobe due to infectious organism    Abnormal EKG    COPD (chronic obstructive pulmonary disease)    Mild malnutrition    Acute on chronic respiratory failure with hypoxia    Fracture, intertrochanteric, right femur, closed, initial encounter    Chronic diastolic CHF (congestive heart failure)    Hematoma of frontal scalp    Postoperative anemia due to acute blood loss    Urinary retention    Hemorrhagic disorder due to circulating anticoagulants    History of fracture of right hip    Closed fracture of right hip with routine healing    Chronic low back pain with sciatica    Change in bowel function    Rectal bleeding    Prostate cancer    On home oxygen therapy    Acute viral bronchitis    Peripheral neuropathy    Plantar fasciitis    HTN (hypertension)    COPD exacerbation    Bilateral lower extremity edema    Microscopic hematuria    Acute midline low back pain with right-sided sciatica    Spinal stenosis, lumbar region with neurogenic claudication    Compression deformity of vertebra    Rectal bleed    Esophagitis    Angiectasia    Hiatal hernia    Overweight (BMI 25.0-29.9)    Leukocytosis    Bilateral hip pain    Elevated troponin level not due  myocardial infarction    Nocturnal hypoxia    Pneumonia of right upper lobe due to infectious organism    NSTEMI (non-ST elevated myocardial infarction)    Chronic respiratory failure with hypoxia    Paroxysmal atrial fibrillation    Acute exacerbation of chronic obstructive pulmonary disease (COPD)    Anemia    Non-compliant patient    Hypokalemia    Spondylolisthesis of lumbar region    Elevated troponin    Rhabdomyolysis    Multiple contusions    Fall    Dizziness    Contusion of hip    Pulmonary embolism    COPD with acute exacerbation     Past Medical History:   Diagnosis Date    ADHD (attention deficit hyperactivity disorder)     Bronchiectasis     CHF (congestive heart failure)     Colon polyp     COPD (chronic obstructive pulmonary disease)     Diverticulosis     Hard of hearing     On home oxygen therapy     2 LITERS    Pneumonia     Prostate cancer 04/22/2019    Spinal stenosis, lumbar region with neurogenic claudication 08/02/2022     Past Surgical History:   Procedure Laterality Date    BRONCHOSCOPY N/A 04/30/2016    Procedure: BRONCHOSCOPY with BAL of right lower lobe and left  lower lobe.  ;  Surgeon: Nando Diaz MD;  Location: [facility] ENDOSCOPY;  Service:     BRONCHOSCOPY N/A 04/28/2017    Procedure: BRONCHOSCOPY;  Surgeon: Katharina Salter MD;  Location: [facility] ENDOSCOPY;  Service:     BRONCHOSCOPY Bilateral 06/29/2017    Procedure: BRONCHOSCOPY WITH WASHINGS;  Surgeon: Katharina Salter MD;  Location: [facility] ENDOSCOPY;  Service:     BRONCHOSCOPY N/A 01/22/2018    Procedure: BRONCHOSCOPY AT BEDSIDE with BAL;  Surgeon: Katharina Salter MD;  Location: [facility] ENDOSCOPY;  Service:     BRONCHOSCOPY N/A 01/30/2018    Procedure: BRONCHOSCOPY with BAL and washing;  Surgeon: Shreyas Wild MD;  Location: [facility] ENDOSCOPY;  Service:     BRONCHOSCOPY Bilateral 04/24/2018    Procedure: BRONCHOSCOPY WITH WASHING;  Surgeon: Katharina Salter MD;  Location: [facility] ENDOSCOPY;  Service: Pulmonary    BRONCHOSCOPY N/A  12/28/2019    Procedure: BRONCHOSCOPY with bilateral washing;  Surgeon: Katharina Salter MD;  [facility];  Service: Pulmonary    BRONCHOSCOPY Bilateral 01/04/2023    Procedure: BRONCHOSCOPY with lavage;  Surgeon: Katharina Salter MD;  [facility] END[facility];  Service: Pulmonary;  Laterality: Bilateral;  mucus plugging    CARDIAC CATHETERIZATION N/A 01/29/2023    Procedure: Left Heart Cath;  Surgeon: Johnnie Ang MD;  [facility];  Service: Cardiology;  Laterality: N/A;    CARDIAC CATHETERIZATION N/A 01/29/2023    Procedure: Coronary angiography;  Surgeon: Johnnie Ang MD;  [facility];  Service: Cardiology;  Laterality: N/A;    COLONOSCOPY  05/17/2013    eh, ih, tort, sig tics    COLONOSCOPY N/A 05/10/2019    Non-thrombosed external hemorrhoids found on perianal exam, Diverticulosis, Tortuous colon, One 5 mm polyp in the mid ascending colon, IH. Path: Tubular adenoma.     COLONOSCOPY N/A 09/24/2022    Procedure: COLONOSCOPY to cecum and TI with argon plasma coagulation.;  Surgeon: Bruce Black MD;  Location: [facility] ENDOSCOPY;  Service: Gastroenterology;  Laterality: N/A;  pre- GI bleeding  post- radiation proctitis, diverticulosis    ENDOSCOPY  03/19/2015    z line irreg, hh    ENDOSCOPY N/A 09/24/2022    Procedure: ESOPHAGOGASTRODUODENOSCOPY;  Surgeon: Bruce Black MD;  Location: [facility] ENDOSCOPY;  Service: Gastroenterology;  Laterality: N/A;  pre- GI bleeding  post- esophagitis, hiatal hernia    HIP OPEN REDUCTION Right 01/17/2018    Procedure: HIP OPEN REDUCTION INTERNAL FIXATION WITH DYNAMIC HIP SCREW;  Surgeon: Les Black MD;  Location: [facility] MAIN OR;  Service:     SPINE SURGERY      TONSILLECTOMY      TOTAL HIP ARTHROPLASTY REVISION Right 09/23/2019    Procedure: HIP  REVISION RIGHT;  Surgeon: Isauro Warner MD;  Location: [facility] MAIN OR;  Service: Orthopedics      General Information       Row Name  11/26/24 0946          Physical Therapy Time and Intention    Document Type evaluation (P)   -PB     Mode of Treatment individual therapy;physical therapy (P)   -PB       Row Name 11/26/24 0946          General Information    Patient Profile Reviewed yes (P)   -PB     Prior Level of Function min assist:;gait;transfer;bed mobility (P)   Pt uses rollator to transfer to w/c.  -PB     Existing Precautions/Restrictions fall (P)   -PB     Barriers to Rehab medically complex;previous functional deficit (P)   -PB       Row Name 11/26/24 0946          Living Environment    People in Home facility resident (P)   -PB       Row Name 11/26/24 0946          Cognition    Orientation Status (Cognition) oriented x 4 (P)   -PB       Row Name 11/26/24 0946          Safety Issues/Impairments Affecting Functional Mobility    Impairments Affecting Function (Mobility) strength;endurance/activity tolerance;pain;balance (P)   -PB               User Key  (r) = Recorded By, (t) = Taken By, (c) = Cosigned By      Initials Name Provider Type    Palmira Gonsalez, PT Student PT Student                   Mobility       Row Name 11/26/24 0948          Bed Mobility    Bed Mobility supine-sit;sit-supine (P)   -PB     Supine-Sit [place] (Bed Mobility) minimum assist (75% patient effort) (P)   -PB     Sit-Supine [place] (Bed Mobility) moderate assist (50% patient effort) (P)   -PB     Assistive Device (Bed Mobility) bed rails;head of bed elevated (P)   -PB       Row Name 11/26/24 0948          Sit-Stand Transfer    Sit-Stand [place] (Transfers) moderate assist (50% patient effort);2 person assist (P)   -PB     Assistive Device (Sit-Stand Transfers) walker, front-wheeled (P)   -PB     Comment, (Sit-Stand Transfer) Pt needed to sit down due to back pain. (P)   -PB               User Key  (r) = Recorded By, (t) = Taken By, (c) = Cosigned By      Initials Name Provider Type    Palmira Gonsalez, PT Student PT Student                    Obj/Interventions       Row Name 11/26/24 0979          Range of Motion Comprehensive    Comment, General Range of Motion Pt unable to extend hips and knees while standing. (P)   -PB       Row Name 11/26/24 0933          Strength Comprehensive (MMT)    Comment, General Manual Muscle Testing (MMT) Assessment generalized weakness noted with functional movements. (P)   -PB       Row Name 11/26/24 0946          Balance    Balance Assessment standing static balance;sitting dynamic balance (P)   -PB     Dynamic Sitting Balance contact guard (P)   -PB     Static Standing Balance minimal assist (P)   -PB               User Key  (r) = Recorded By, (t) = Taken By, (c) = Cosigned By      Initials Name Provider Type    PB Palmira Flaherty, PT Student PT Student                   Goals/Plan       Row Name 11/26/24 0957          Bed Mobility Goal 1 (PT)    [place] Level/Cues Needed (Bed Mobility Goal 1, PT) independent (P)   -PB     Time Frame (Bed Mobility Goal 1, PT) 1 week (P)   -PB       Row Name 11/26/24 0957          Transfer Goal 1 (PT)    Activity/Assistive Device (Transfer Goal 1, PT) transfers, all;walker, rolling (P)   -PB     [place] Level/Cues Needed (Transfer Goal 1, PT) minimum assist (75% or more patient effort) (P)   -PB     Time Frame (Transfer Goal 1, PT) 1 week (P)   -PB       Row Name 11/26/24 0957          Gait Training Goal 1 (PT)    Activity/Assistive Device (Gait Training Goal 1, PT) gait (walking locomotion);walker, rolling (P)   -PB     [place] Level (Gait Training Goal 1, PT) minimum assist (75% or more patient effort) (P)   -PB     Distance (Gait Training Goal 1, PT) 30 ft (P)   -PB     Time Frame (Gait Training Goal 1, PT) 1 week (P)   -PB       Row Name 11/26/24 0957          Therapy Assessment/Plan (PT)    Planned Therapy Interventions (PT) balance training;bed mobility training;gait training;home exercise program;patient/family education;transfer training;postural  re-education;strengthening;ROM (range of motion) (P)   -PB               User Key  (r) = Recorded By, (t) = Taken By, (c) = Cosigned By      Initials Name Provider Type    Palmira Gonsalez, PT Student PT Student                   Clinical Impression       Row Name 11/26/24 0951          Pain    Pretreatment Pain Rating 3/10 (P)   -PB     Posttreatment Pain Rating 5/10 (P)   -PB     Pain Location back (P)   -PB     Pain Side/Orientation lower (P)   -PB     Pain Management Interventions activity modification encouraged (P)   -PB     Response to Pain Interventions activity participation with increased pain (P)   -PB       Row Name 11/26/24 0951          Plan of Care Review    Plan of Care Reviewed With patient (P)   -PB     Outcome Evaluation Pt is a 85 y/o M admitted for COPD. Pt is a facility resident and uses a rollator to transfer over to a w/c at baseline due to lower back pain. Pt presents to PT with decreased activity tolerance, pain and generalized weakness affecting mobility. Pt was able to sit EOB with MinAx1 and stand with rwx ModAx2. Pt had an increase in back pain with sitting and standing with flexed posture. Pt was ModAx1 to lay supine. Pt will continue to benefit from skilled PT to address mobility and strength. (P)   -PB       Row Name 11/26/24 0951          Therapy Assessment/Plan (PT)    Patient/Family Therapy Goals Statement (PT) to return to PLOF. (P)   -PB     Rehab Potential (PT) fair (P)   -PB     Criteria for Skilled Interventions Met (PT) yes;skilled treatment is necessary (P)   -PB     Therapy Frequency (PT) 5 times/wk (P)   -PB       Row Name 11/26/24 0951          Positioning and Restraints    Pre-Treatment Position in bed (P)   -PB     Post Treatment Position bed (P)   -PB     In Bed supine;call light within reach;encouraged to call for assist;exit alarm on (P)   -PB               User Key  (r) = Recorded By, (t) = Taken By, (c) = Cosigned By      Initials Name Provider Type    DAMION Flaherty  Palmira, PT Student PT Student                   Outcome Measures       Row Name 11/26/24 0958          How much help from another person do you currently need...    Turning from your back to your side while in flat bed without using bedrails? 4 (P)   -PB     Moving from lying on back to sitting on the side of a flat bed without bedrails? 3 (P)   -PB     Moving to and from a bed to a chair (including a wheelchair)? 2 (P)   -PB     Standing up from a chair using your arms (e.g., wheelchair, bedside chair)? 2 (P)   -PB     Climbing 3-5 steps with a railing? 1 (P)   -PB     To walk in hospital room? 2 (P)   -PB     AM-PAC 6 Clicks Score (PT) 14 (P)   -PB     Highest Level of Mobility Goal 4 --> Transfer to chair/commode (P)   -PB       Row Name 11/26/24 0958          Functional Assessment    Outcome Measure Options AM-PAC 6 Clicks Basic Mobility (PT) (P)   -PB               User Key  (r) = Recorded By, (t) = Taken By, (c) = Cosigned By      Initials Name Provider Type    PB Palmira Flaherty, PT Student PT Student                                 Physical Therapy Education       Title: PT OT SLP Therapies (In Progress)       Topic: Physical Therapy (In Progress)       Point: Mobility training (Done)       Learning Progress Summary            Patient Acceptance, E, VU,NR by PB at 11/26/2024 0959                      Point: Home exercise program (Not Started)       Learner Progress:  Not documented in this visit.              Point: Body mechanics (Done)       Learning Progress Summary            Patient Acceptance, E, VU,NR by PB at 11/26/2024 0959                      Point: Precautions (Done)       Learning Progress Summary            Patient Acceptance, E, VU,NR by PB at 11/26/2024 0959                                      User Key       Initials Effective Dates Name Provider Type Discipline    PB 10/22/24 -  Plamira Flaherty, PT Student PT Student PT                  PT Recommendation and Plan  Planned Therapy Interventions  (PT): (P) balance training, bed mobility training, gait training, home exercise program, patient/family education, transfer training, postural re-education, strengthening, ROM (range of motion)  Outcome Evaluation: (P) Pt is a 85 y/o M admitted for COPD. Pt is a facility resident and uses a rollator to transfer over to a w/c at baseline due to lower back pain. Pt presents to PT with decreased activity tolerance, pain and generalized weakness affecting mobility. Pt was able to sit EOB with MinAx1 and stand with rwx ModAx2. Pt had an increase in back pain with sitting and standing with flexed posture. Pt was ModAx1 to lay supine. Pt will continue to benefit from skilled PT to address mobility and strength.     Time Calculation:         PT Charges       Row Name 11/26/24 1000             Time Calculation    Start Time 0929 (P)   -PB      Stop Time 0942 (P)   -PB      Time Calculation (min) 13 min (P)   -PB      PT Received On 11/26/24 (P)   -PB      PT - Next Appointment 11/27/24 (P)   -PB      PT Goal Re-Cert Due Date 12/03/24 (P)   -PB         Time Calculation- PT    Total Timed Code Minutes- PT 8 minute(s) (P)   -PB         Timed Charges    16076 - PT Therapeutic Activity Minutes 8 (P)   -PB         Total Minutes    Timed Charges Total Minutes 8 (P)   -PB       Total Minutes 8 (P)   -PB                User Key  (r) = Recorded By, (t) = Taken By, (c) = Cosigned By      Initials Name Provider Type    PB Palmira Flaherty, PT Student PT Student                  Therapy Charges for Today       Code Description Service Date Service Provider Modifiers Qty    36861252395 HC PT THERAPEUTIC ACT EA 15 MIN 11/26/2024 Palmira Flaherty, PT Student GP 1    39876561370 HC PT EVAL MOD COMPLEXITY 2 11/26/2024 Palmira Flaherty, PT Student GP 1            PT G-Codes  Outcome Measure Options: (P) AM-PAC 6 Clicks Basic Mobility (PT)  AM-PAC 6 Clicks Score (PT): (P) 14  PT Discharge Summary  Anticipated Discharge Disposition (PT): (P) extended care  facility    Palmira Flaherty, PT Student  11/26/2024

## 2024-11-26 NOTE — PLAN OF CARE
Goal Outcome Evaluation:           Progress: improving  Outcome Evaluation: VSS. Pt can be forgetful at times. turns in bed. Uses urinal. Steroids Po. On RA during day.

## 2024-11-26 NOTE — PROGRESS NOTES
Name: Tony Leonard ADMIT: 2024   : 1940  PCP: Alfonso Goldstein MD    MRN: [id number] LOS: 0 days   AGE/SEX: 84 y.o. male  ROOM: [facility]     Subjective   Subjective     No events overnight. No complaints. Moving oxygen well on exam. No wheezing.       Objective   Objective   Vital Signs  Temp:  [97.5 °F (36.4 °C)-98.9 °F (37.2 °C)] 97.7 °F (36.5 °C)  Heart Rate:  [76-99] 97  Resp:  [14-20] 18  BP: (119-137)/(60-73) 137/73  SpO2:  [93 %-100 %] 96 %  on  Flow (L/min) (Oxygen Therapy):  [1] 1;   Device (Oxygen Therapy): room air  Body mass index is 26.04 kg/m².  Physical Exam  Constitutional:       General: He is not in acute distress.     Appearance: He is not toxic-appearing.   Cardiovascular:      Rate and Rhythm: Normal rate and regular rhythm.      Heart sounds: Normal heart sounds.   Pulmonary:      Effort: Pulmonary effort is normal. No respiratory distress.      Breath sounds: Normal breath sounds. No wheezing, rhonchi or rales.   Abdominal:      General: Bowel sounds are normal.      Palpations: Abdomen is soft.   Musculoskeletal:         General: No tenderness.      Right lower leg: No edema.      Left lower leg: No edema.   Neurological:      Mental Status: He is alert.   Psychiatric:         Mood and Affect: Mood normal.         Behavior: Behavior normal.         Results Review     I reviewed the patient's new clinical results.  Results from last 7 days   Lab Units 24  0820 24  0358 24  0043   WBC 10*3/mm3 7.56 6.67 7.55   HEMOGLOBIN g/dL 9.3* 9.4* 9.1*   PLATELETS 10*3/mm3 312 290 305     Results from last 7 days   Lab Units 24  0819 24  0358 24  0043   SODIUM mmol/L 140 139 138   POTASSIUM mmol/L 3.7 3.9 4.5   CHLORIDE mmol/L 105 103 102   CO2 mmol/L 23.0 25.5 25.8   BUN mg/dL 16 14 15   CREATININE mg/dL 0.88 0.92 1.03   GLUCOSE mg/dL 135* 119* 107*   Estimated Creatinine Clearance: 72.2 mL/min (by C-G formula based on SCr of 0.88  "mg/dL).  Results from last 7 days   Lab Units 11/25/24  0043   ALBUMIN g/dL 3.4*   BILIRUBIN mg/dL 0.4   ALK PHOS U/L 71   AST (SGOT) U/L 31   ALT (SGPT) U/L 11     Results from last 7 days   Lab Units 11/26/24  0819 11/25/24  0358 11/25/24  0043   CALCIUM mg/dL 8.8 8.8 8.7   ALBUMIN g/dL  --   --  3.4*   MAGNESIUM mg/dL 2.4  --   --      Results from last 7 days   Lab Units 11/25/24  0043   PROCALCITONIN ng/mL 0.06     COVID19   Date Value Ref Range Status   11/25/2024 Not Detected Not Detected - Ref. Range Final   02/06/2024 Not Detected Not Detected - Ref. Range Final   12/21/2023 Not Detected Not Detected - Ref. Range Final   10/18/2023 Not Detected Not Detected - Ref. Range Final   07/11/2023 Not Detected Not Detected - Ref. Range Final     No results found for: \"HGBA1C\", \"POCGLU\"    XR Chest 1 View  Narrative: SINGLE VIEW OF THE CHEST     HISTORY: Dyspnea     COMPARISON: April 19, 2024     FINDINGS:  There is cardiomegaly. There is vascular congestion. There is  calcification of the aorta. There is a small left pleural effusion. No  pneumothorax is seen. Lung volumes remain diminished. There are  background changes of COPD.     Impression: Cardiomegaly with suspected vascular congestion.     This report was finalized on 11/25/2024 1:22 AM by Dr. Danielle Arango M.D on Workstation: [id number]       Scheduled Medications  albuterol, 2.5 mg, Nebulization, BID - RT  arformoterol, 15 mcg, Nebulization, BID - RT  budesonide, 0.5 mg, Nebulization, BID - RT  enoxaparin, 40 mg, Subcutaneous, Q24H  FLUoxetine, 20 mg, Oral, Daily  furosemide, 20 mg, Oral, BID  gabapentin, 300 mg, Oral, TID  guaiFENesin, 1,200 mg, Oral, BID  pantoprazole, 40 mg, Oral, Daily  predniSONE, 20 mg, Oral, Daily With Breakfast  sodium chloride, 10 mL, Intravenous, Q12H  sodium chloride, 4 mL, Nebulization, BID - RT  sucralfate, 1 g, Oral, 4x Daily With Meals & Nightly  tiotropium bromide monohydrate, 2 puff, Inhalation, Daily - " RT    Infusions   Diet  Diet: Cardiac; Healthy Heart (2-3 Na+); Texture: Soft to Chew (NDD 3); Soft to Chew: Whole Meat; Fluid Consistency: Thin (IDDSI 0)       Assessment/Plan     Active Hospital Problems    Diagnosis  POA    **COPD with acute exacerbation [J44.1]  Yes    COPD exacerbation [J44.1]  Yes    Chronic diastolic CHF (congestive heart failure) [I50.32]  Yes    Gastroesophageal reflux disease [K21.9]  Yes      Resolved Hospital Problems   No resolved problems to display.       84 y.o. male admitted with COPD with acute exacerbation.    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 Continue inhalers.   Chronic diastolic heart failure-euvolemic. Continue lasix  GERD-ppi, carafate  Anemia-update iron studies, b12, folate  Lovenox 40 mg SC daily for DVT prophylaxis.  Full code.  Discussed with patient.  Anticipate discharge  extended care facility  when cleared by consultants.      Pop Hauser MD  [facility]  11/26/24  17:45 EST

## 2024-11-26 NOTE — PLAN OF CARE
Goal Outcome Evaluation:  Plan of Care Reviewed With: (P) patient           Outcome Evaluation: (P) Pt is a 85 y/o M admitted for COPD. Pt is a facility resident and uses a rollator to transfer over to a w/c at baseline due to lower back pain. Pt presents to PT with decreased activity tolerance, pain and generalized weakness affecting mobility. Pt was able to sit EOB with MinAx1 and stand with rwx ModAx2. Pt had an increase in back pain with sitting and standing with flexed posture. Pt was ModAx1 to lay supine. Pt will continue to benefit from skilled PT to address mobility and strength.    Anticipated Discharge Disposition (PT): (P) extended care facility

## 2024-11-26 NOTE — PLAN OF CARE
Problem: Adult Inpatient Plan of Care  Goal: Plan of Care Review  Outcome: Progressing  Flowsheets (Taken 11/26/2024 0440)  Progress: improving  Outcome Evaluation: Pt VS stable. On room air currently, O2 sats in the 90's. Refused morning labs. Pt safety maintained.

## 2024-11-26 NOTE — PROGRESS NOTES
"  Daily Progress Note.   [address]  11/26/2024    Patient:  Name:  Tony Leonard  MRN:  [id number]  1940  84 y.o.  male         Requesting physician: Dr. Jorje Mcdermott     Reason for Consultation: Shortness of breath acute exacerbation of underlying COPD/eosinophilia     CC: Shortness of breath    Interval History:  Awake alert says breathing feels better  No worsening cough sputum production chest tightness palpitations chest pain  No lethargy confusion.  Tolerating a diet no emesis        Physical Exam:  /64 (BP Location: Right arm, Patient Position: Lying)   Pulse 82   Temp 98.9 °F (37.2 °C) (Oral)   Resp 20   Ht 180.3 cm (71\")   Wt 84.7 kg (186 lb 11.7 oz)   SpO2 99%   BMI 26.04 kg/m²   Body mass index is 26.04 kg/m².    Intake/Output Summary (Last 24 hours) at 11/26/2024 0947  Last data filed at 11/26/2024 0729  Gross per 24 hour   Intake 1020 ml   Output 900 ml   Net 120 ml     General appearance: Appears stated age, conversant   Eyes: Anicteric sclerae, moist conjunctivae; no lid lag;   HENT: Atraumatic; oropharynx clear with moist mucous membranes and no mucosal ulcerations; normal hard and soft palate  Neck: Trachea midline; supple  Lungs: No wheeze no rhonchi, with normal respiratory effort and no intercostal retractions  CV: RRR, no rub  Abdomen: Soft, nontender; no masses or HSM  Extremities: Mild peripheral edema  Skin:  warm dry well-perfused  Psych: Appropriate affect, alert neuro cranials 2 through 12 grossly intact speech intact moves all extremities    Data Review:  Notable Labs:  Results from last 7 days   Lab Units 11/26/24  0820 11/25/24  0358 11/25/24  0043   WBC 10*3/mm3 7.56 6.67 7.55   HEMOGLOBIN g/dL 9.3* 9.4* 9.1*   PLATELETS 10*3/mm3 312 290 305     Results from last 7 days   Lab Units 11/26/24  0819 11/25/24  0358 11/25/24  0043   SODIUM mmol/L 140 139 138   POTASSIUM mmol/L 3.7 3.9 4.5   CHLORIDE mmol/L 105 103 102   CO2 mmol/L 23.0 25.5 " 25.8   BUN mg/dL 16 14 15   CREATININE mg/dL 0.88 0.92 1.03   GLUCOSE mg/dL 135* 119* 107*   CALCIUM mg/dL 8.8 8.8 8.7   MAGNESIUM mg/dL 2.4  --   --    Estimated Creatinine Clearance: 72.2 mL/min (by C-G formula based on SCr of 0.88 mg/dL).    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  Stop IV Solu-Medrol start p.o. pred 20  Scheduled DuoNebs Brovana budesonide hypertonic saline  Flutter valve  Incentive spirometer  Azithromycin for acute exacerbation  Repeat two-view chest x-ray in the morning to evaluate the left base.      Patient of Dr. Salter in our office.            Electronically signed by Scott Bob MD, 11/26/24, 9:47 AM [facility]

## 2024-11-27 VITALS
SYSTOLIC BLOOD PRESSURE: 130 MMHG | BODY MASS INDEX: 26.14 KG/M2 | HEART RATE: 86 BPM | RESPIRATION RATE: 16 BRPM | HEIGHT: 71 IN | DIASTOLIC BLOOD PRESSURE: 64 MMHG | TEMPERATURE: 97.5 F | OXYGEN SATURATION: 97 % | WEIGHT: 186.73 LBS

## 2024-11-27 PROBLEM — J44.1 COPD WITH ACUTE EXACERBATION: Status: RESOLVED | Noted: 2024-11-25 | Resolved: 2024-11-27

## 2024-11-27 PROBLEM — J44.1 COPD EXACERBATION: Status: RESOLVED | Noted: 2022-05-07 | Resolved: 2024-11-27

## 2024-11-27 LAB
ANION GAP SERPL CALCULATED.3IONS-SCNC: 15.6 MMOL/L (ref 5–15)
BUN SERPL-MCNC: 26 MG/DL (ref 8–23)
BUN/CREAT SERPL: 29.2 (ref 7–25)
CALCIUM SPEC-SCNC: 8.5 MG/DL (ref 8.6–10.5)
CHLORIDE SERPL-SCNC: 101 MMOL/L (ref 98–107)
CO2 SERPL-SCNC: 25.4 MMOL/L (ref 22–29)
CREAT SERPL-MCNC: 0.89 MG/DL (ref 0.76–1.27)
DEPRECATED RDW RBC AUTO: 44.9 FL (ref 37–54)
EGFRCR SERPLBLD CKD-EPI 2021: 83.5 ML/MIN/1.73
ERYTHROCYTE [DISTWIDTH] IN BLOOD BY AUTOMATED COUNT: 16.4 % (ref 12.3–15.4)
FOLATE SERPL-MCNC: 7.81 NG/ML (ref 4.78–24.2)
GLUCOSE SERPL-MCNC: 173 MG/DL (ref 65–99)
HCT VFR BLD AUTO: 30.4 % (ref 37.5–51)
HGB BLD-MCNC: 9.5 G/DL (ref 13–17.7)
MCH RBC QN AUTO: 23.8 PG (ref 26.6–33)
MCHC RBC AUTO-ENTMCNC: 31.3 G/DL (ref 31.5–35.7)
MCV RBC AUTO: 76.2 FL (ref 79–97)
PLATELET # BLD AUTO: 319 10*3/MM3 (ref 140–450)
PMV BLD AUTO: 9 FL (ref 6–12)
POTASSIUM SERPL-SCNC: 3 MMOL/L (ref 3.5–5.2)
RBC # BLD AUTO: 3.99 10*6/MM3 (ref 4.14–5.8)
SODIUM SERPL-SCNC: 142 MMOL/L (ref 136–145)
VIT B12 BLD-MCNC: 700 PG/ML (ref 211–946)
WBC NRBC COR # BLD AUTO: 9.41 10*3/MM3 (ref 3.4–10.8)

## 2024-11-27 PROCEDURE — 82607 VITAMIN B-12: CPT | Performed by: STUDENT IN AN ORGANIZED HEALTH CARE EDUCATION/TRAINING PROGRAM

## 2024-11-27 PROCEDURE — 94761 N-INVAS EAR/PLS OXIMETRY MLT: CPT

## 2024-11-27 PROCEDURE — G0378 HOSPITAL OBSERVATION PER HR: HCPCS

## 2024-11-27 PROCEDURE — 94799 UNLISTED PULMONARY SVC/PX: CPT

## 2024-11-27 PROCEDURE — 82746 ASSAY OF FOLIC ACID SERUM: CPT | Performed by: STUDENT IN AN ORGANIZED HEALTH CARE EDUCATION/TRAINING PROGRAM

## 2024-11-27 PROCEDURE — 97110 THERAPEUTIC EXERCISES: CPT

## 2024-11-27 PROCEDURE — 80048 BASIC METABOLIC PNL TOTAL CA: CPT | Performed by: STUDENT IN AN ORGANIZED HEALTH CARE EDUCATION/TRAINING PROGRAM

## 2024-11-27 PROCEDURE — 63710000001 PREDNISONE PER 1 MG: Performed by: INTERNAL MEDICINE

## 2024-11-27 PROCEDURE — 85027 COMPLETE CBC AUTOMATED: CPT | Performed by: STUDENT IN AN ORGANIZED HEALTH CARE EDUCATION/TRAINING PROGRAM

## 2024-11-27 PROCEDURE — 25010000002 ENOXAPARIN PER 10 MG: Performed by: INTERNAL MEDICINE

## 2024-11-27 PROCEDURE — 96372 THER/PROPH/DIAG INJ SC/IM: CPT

## 2024-11-27 PROCEDURE — 94664 DEMO&/EVAL PT USE INHALER: CPT

## 2024-11-27 PROCEDURE — 97535 SELF CARE MNGMENT TRAINING: CPT

## 2024-11-27 RX ORDER — GABAPENTIN 300 MG/1
300 CAPSULE ORAL 3 TIMES DAILY
Qty: 6 CAPSULE | Refills: 0 | Status: SHIPPED | OUTPATIENT
Start: 2024-11-27

## 2024-11-27 RX ORDER — PREDNISONE 20 MG/1
20 TABLET ORAL
Qty: 4 TABLET | Refills: 0 | Status: SHIPPED | OUTPATIENT
Start: 2024-11-28 | End: 2024-12-02

## 2024-11-27 RX ORDER — FERROUS SULFATE 325(65) MG
325 TABLET ORAL
Qty: 30 TABLET | Refills: 0 | Status: SHIPPED | OUTPATIENT
Start: 2024-11-28 | End: 2024-12-28

## 2024-11-27 RX ORDER — AZITHROMYCIN 250 MG/1
TABLET, FILM COATED ORAL
Qty: 4 TABLET | Refills: 0 | Status: SHIPPED | OUTPATIENT
Start: 2024-11-28

## 2024-11-27 RX ORDER — FERROUS SULFATE 325(65) MG
325 TABLET ORAL
Status: DISCONTINUED | OUTPATIENT
Start: 2024-11-27 | End: 2024-11-27 | Stop reason: HOSPADM

## 2024-11-27 RX ORDER — AZITHROMYCIN 250 MG/1
500 TABLET, FILM COATED ORAL ONCE
Status: COMPLETED | OUTPATIENT
Start: 2024-11-27 | End: 2024-11-27

## 2024-11-27 RX ORDER — POTASSIUM CHLORIDE 1500 MG/1
40 TABLET, EXTENDED RELEASE ORAL EVERY 4 HOURS
Start: 2024-11-27 | End: 2024-11-28

## 2024-11-27 RX ORDER — POTASSIUM CHLORIDE 750 MG/1
40 TABLET, FILM COATED, EXTENDED RELEASE ORAL EVERY 4 HOURS
Status: DISCONTINUED | OUTPATIENT
Start: 2024-11-27 | End: 2024-11-27 | Stop reason: HOSPADM

## 2024-11-27 RX ORDER — FLUTICASONE FUROATE, UMECLIDINIUM BROMIDE AND VILANTEROL TRIFENATATE 100; 62.5; 25 UG/1; UG/1; UG/1
1 POWDER RESPIRATORY (INHALATION)
Start: 2024-11-27

## 2024-11-27 RX ORDER — AZITHROMYCIN 250 MG/1
250 TABLET, FILM COATED ORAL
Status: DISCONTINUED | OUTPATIENT
Start: 2024-11-28 | End: 2024-11-27 | Stop reason: HOSPADM

## 2024-11-27 RX ADMIN — ARFORMOTEROL TARTRATE 15 MCG: 15 SOLUTION RESPIRATORY (INHALATION) at 07:21

## 2024-11-27 RX ADMIN — BUDESONIDE 0.5 MG: 0.5 INHALANT RESPIRATORY (INHALATION) at 07:21

## 2024-11-27 RX ADMIN — SUCRALFATE 1 G: 1 TABLET ORAL at 09:32

## 2024-11-27 RX ADMIN — FERROUS SULFATE TAB 325 MG (65 MG ELEMENTAL FE) 325 MG: 325 (65 FE) TAB at 09:31

## 2024-11-27 RX ADMIN — TIOTROPIUM BROMIDE INHALATION SPRAY 2 PUFF: 3.12 SPRAY, METERED RESPIRATORY (INHALATION) at 07:21

## 2024-11-27 RX ADMIN — GUAIFENESIN 1200 MG: 600 TABLET, EXTENDED RELEASE ORAL at 09:32

## 2024-11-27 RX ADMIN — PREDNISONE 20 MG: 20 TABLET ORAL at 09:32

## 2024-11-27 RX ADMIN — AZITHROMYCIN DIHYDRATE 500 MG: 250 TABLET ORAL at 09:31

## 2024-11-27 RX ADMIN — Medication 10 ML: at 09:32

## 2024-11-27 RX ADMIN — Medication 4 ML: at 07:21

## 2024-11-27 RX ADMIN — ALBUTEROL SULFATE 2.5 MG: 2.5 SOLUTION RESPIRATORY (INHALATION) at 07:21

## 2024-11-27 RX ADMIN — ENOXAPARIN SODIUM 40 MG: 100 INJECTION SUBCUTANEOUS at 15:28

## 2024-11-27 RX ADMIN — SUCRALFATE 1 G: 1 TABLET ORAL at 15:28

## 2024-11-27 RX ADMIN — POTASSIUM CHLORIDE 40 MEQ: 750 TABLET, EXTENDED RELEASE ORAL at 15:28

## 2024-11-27 RX ADMIN — PANTOPRAZOLE SODIUM 40 MG: 40 TABLET, DELAYED RELEASE ORAL at 09:31

## 2024-11-27 RX ADMIN — GABAPENTIN 300 MG: 300 CAPSULE ORAL at 15:28

## 2024-11-27 RX ADMIN — GABAPENTIN 300 MG: 300 CAPSULE ORAL at 09:32

## 2024-11-27 RX ADMIN — FUROSEMIDE 20 MG: 20 TABLET ORAL at 09:31

## 2024-11-27 RX ADMIN — FLUOXETINE HYDROCHLORIDE 20 MG: 20 CAPSULE ORAL at 09:32

## 2024-11-27 NOTE — DISCHARGE PLACEMENT REQUEST
"Tony Casey (84 y.o. Male)       Date of Birth   1940    Social Security Number       Address   [address] [facility] [address] [place] [address]    Home Phone   [phone]    MRN   [id number]       Episcopalian   Zoroastrianism    Marital Status   Single                            Admission Date   11/25/24    Admission Type   Emergency    Admitting Provider   Jorje Mcdermott MD    Attending Provider   Pop Hauser MD    Department, Room/Bed   [address], E454/1       Discharge Date       Discharge Disposition   Skilled Nursing Facility (DC - External)    Discharge Destination                                 Attending Provider: Pop Hauser MD    Allergies: Daliresp [Roflumilast], Latex, Sulfa Antibiotics    Isolation: None   Infection: None   Code Status: CPR    Ht: 180.3 cm (71\")   Wt: 84.7 kg (186 lb 11.7 oz)    Admission Cmt: None   Principal Problem: COPD with acute exacerbation [J44.1]                   Active Insurance as of 11/25/2024       Primary Coverage       Payor Plan Insurance Group Employer/Plan Group    HUMANA MEDICARE REPLACEMENT HUMANA MED ADV SNP PPO [id number]       Payor Plan Address Payor Plan Phone Number Payor Plan Fax Number Effective Dates    [address] [address] [phone]  11/1/2024 - None Entered    [facility] [address]         Subscriber Name Subscriber Birth Date Member ID       TONY CASEY 1940 [id number]               Secondary Coverage       Payor Plan Insurance Group Employer/Plan Group     FOR LIFE  FOR LIFE MC SUP         Payor Plan Address Payor Plan Phone Number Payor Plan Fax Number Effective Dates    [address] [phone]  3/10/2016 - None Entered    [facility] [address]         Subscriber Name Subscriber Birth Date Member ID       TONY CASEY 1940 [id number]                     Emergency Contacts        (Rel.) Home Phone Work Phone Mobile Phone    VICKY CASEY (Son) " [phone] -- [phone]    Frederick Leonard (Son) -- -- [phone]    Ricardo Leonard (Son) [phone] -- [phone]                   Discharge Summary        Pop Hauser MD at 24 1435              Patient Name: Tony Leonard  : 1940  MRN: [id number]    Date of Admission: 2024  Date of Discharge:  2024  Primary Care Physician: Alfonso Goldstein MD      Chief Complaint:   Shortness of Breath      Discharge Diagnoses     Active Hospital Problems    Diagnosis  POA    Chronic diastolic CHF (congestive heart failure) [I50.32]  Yes    Gastroesophageal reflux disease [K21.9]  Yes      Resolved Hospital Problems    Diagnosis Date Resolved POA    **COPD with acute exacerbation [J44.1] 2024 Yes    COPD exacerbation [J44.1] 2024 Yes        Hospital Course     Mr. Leonard is a 84 y.o. male with a history of COPD/eosinophilic asthma/bronchiectasis, chronic diastolic heart failure, GERD who presented to [facility] initially complaining of shortness of breath and wheezing.  Please see the admitting history and physical for further details.  He was found to have an acute exacerbation of COPD/bronchiectasis/eosinophilic asthma and was admitted to the hospital for further evaluation and treatment.      He was seen in consultation by pulmonology and started on steroids, nebulizers, azithromycin, and mucolytics.  His symptoms improved rapidly, and he has been cleared for discharge back to his facility today.  He will complete a short course of azithromycin and oral steroids at his facility, and I will change his an Anoro Ellipta to Trelegy Ellipta at discharge.  He also is mildly hypokalemic today, and so we will discharge him with a couple of doses of potassium.    He was also noted to have some iron deficiency, and I have started him on oral iron at discharge.    Day of Discharge     Subjective:  No events overnight. No complaints. His labs came back late today  after the decision to discharge him had already been made and they show some hypokalemia. We'll send him home with some oral potassium for a few doses to rectify this.    Physical Exam:  Temp:  [97.5 °F (36.4 °C)-98.4 °F (36.9 °C)] 97.5 °F (36.4 °C)  Heart Rate:  [86-97] 86  Resp:  [16-18] 16  BP: ()/(53-74) 130/64  Body mass index is 26.04 kg/m².  Physical Exam  Constitutional:       General: He is not in acute distress.     Appearance: He is not toxic-appearing.   Cardiovascular:      Rate and Rhythm: Normal rate and regular rhythm.      Heart sounds: Normal heart sounds.   Pulmonary:      Effort: Pulmonary effort is normal. No respiratory distress.      Breath sounds: Normal breath sounds. No wheezing, rhonchi or rales.   Abdominal:      General: Bowel sounds are normal.      Palpations: Abdomen is soft.   Musculoskeletal:         General: No tenderness.      Right lower leg: No edema.      Left lower leg: No edema.   Neurological:      Mental Status: He is alert.   Psychiatric:         Mood and Affect: Mood normal.         Behavior: Behavior normal.         Consultants     Consult Orders (all) (From admission, onward)       Start     Ordered    11/25/24 1042  Inpatient Pulmonology Consult  Once        Specialty:  Pulmonary Disease  Provider:  Katharina Salter MD    11/25/24 1041    11/25/24 0406  Inpatient Spiritual Care Consult  Once        Provider:  (Not yet assigned)    11/25/24 0406    11/25/24 0406  Inpatient Case Management  Consult  Once        Provider:  (Not yet assigned)    11/25/24 0406    11/25/24 0204  [facility] (on-call MD unless specified) Details  Once        Specialty:  Hospitalist  Provider:  (Not yet assigned)    11/25/24 0204                  Procedures     Imaging Results (All)       Procedure Component Value Units Date/Time    XR Chest 1 View [[id number]] Collected: 11/25/24 0121     Updated: 11/25/24 0125    Narrative:      SINGLE VIEW OF THE CHEST     HISTORY: Dyspnea    "  COMPARISON: April 19, 2024     FINDINGS:  There is cardiomegaly. There is vascular congestion. There is  calcification of the aorta. There is a small left pleural effusion. No  pneumothorax is seen. Lung volumes remain diminished. There are  background changes of COPD.       Impression:      Cardiomegaly with suspected vascular congestion.     This report was finalized on 11/25/2024 1:22 AM by Dr. Danielle Arango M.D on Workstation: [id number]               Pertinent Labs     Results from last 7 days   Lab Units 11/27/24  0913 11/26/24  0820 11/25/24 0358 11/25/24  0043   WBC 10*3/mm3 9.41 7.56 6.67 7.55   HEMOGLOBIN g/dL 9.5* 9.3* 9.4* 9.1*   PLATELETS 10*3/mm3 319 312 290 305     Results from last 7 days   Lab Units 11/27/24  0913 11/26/24  0819 11/25/24 0358 11/25/24  0043   SODIUM mmol/L 142 140 139 138   POTASSIUM mmol/L 3.0* 3.7 3.9 4.5   CHLORIDE mmol/L 101 105 103 102   CO2 mmol/L 25.4 23.0 25.5 25.8   BUN mg/dL 26* 16 14 15   CREATININE mg/dL 0.89 0.88 0.92 1.03   GLUCOSE mg/dL 173* 135* 119* 107*   Estimated Creatinine Clearance: 71.4 mL/min (by C-G formula based on SCr of 0.89 mg/dL).  Results from last 7 days   Lab Units 11/25/24  0043   ALBUMIN g/dL 3.4*   BILIRUBIN mg/dL 0.4   ALK PHOS U/L 71   AST (SGOT) U/L 31   ALT (SGPT) U/L 11     Results from last 7 days   Lab Units 11/27/24  0913 11/26/24  0819 11/25/24 0358 11/25/24  0043   CALCIUM mg/dL 8.5* 8.8 8.8 8.7   ALBUMIN g/dL  --   --   --  3.4*   MAGNESIUM mg/dL  --  2.4  --   --        Results from last 7 days   Lab Units 11/25/24  0553 11/25/24  0358 11/25/24  0136 11/25/24  0043   HSTROP T ng/L 28* 33* 38*  --    PROBNP pg/mL  --   --   --  410.0           Invalid input(s): \"LDLCALC\"        Test Results Pending at Discharge       Discharge Details        Discharge Medications        New Medications        Instructions Start Date   azithromycin 250 MG tablet  Commonly known as: ZITHROMAX   Indications: Worsening of Chronic Obstructive " Lung Disease   Start Date: November 28, 2024     ferrous sulfate 325 (65 FE) MG tablet   325 mg, Oral, Daily With Breakfast   Start Date: November 28, 2024     potassium chloride 20 MEQ CR tablet  Commonly known as: KLOR-CON M20   40 mEq, Oral, Every 4 Hours      predniSONE 20 MG tablet  Commonly known as: DELTASONE   20 mg, Oral, Daily With Breakfast   Start Date: November 28, 2024     Trelegy Ellipta 100-62.5-25 MCG/ACT inhaler  Generic drug: Fluticasone-Umeclidin-Vilant   1 puff, Inhalation, Daily - RT             Continue These Medications        Instructions Start Date   acetaminophen 325 MG tablet  Commonly known as: TYLENOL   650 mg, Oral, Every 4 Hours PRN      albuterol (2.5 MG/3ML) 0.083% nebulizer solution  Commonly known as: PROVENTIL   2.5 mg, Nebulization, Every 6 Hours PRN      calcium carbonate 500 MG chewable tablet  Commonly known as: TUMS   2 tablets, Every 4 Hours PRN      dicyclomine 10 MG capsule  Commonly known as: BENTYL   10 mg, Oral, 3 Times Daily PRN      FLUoxetine 20 MG capsule  Commonly known as: PROzac   TAKE 1 CAPSULE DAILY      furosemide 20 MG tablet  Commonly known as: LASIX   20 mg, 2 Times Daily      gabapentin 300 MG capsule  Commonly known as: NEURONTIN   300 mg, Oral, 3 Times Daily      guaiFENesin 600 MG 12 hr tablet  Commonly known as: MUCINEX   1,200 mg, 2 Times Daily      Melatonin 3 MG capsule   3 mg, As Needed      Multivitamin Adult tablet tablet  Generic drug: multivitamin with minerals   1 tablet, Oral, Daily      ondansetron ODT 4 MG disintegrating tablet  Commonly known as: ZOFRAN-ODT   4 mg, Translingual, Every 6 Hours PRN      pantoprazole 40 MG EC tablet  Commonly known as: PROTONIX   40 mg, Oral, Daily      sodium chloride 7 % nebulizer solution nebulizer solution   4 mL, Nebulization, Every 4 Hours PRN      sucralfate 1 g tablet  Commonly known as: Carafate   1 g, Oral, 4 Times Daily      tiZANidine 2 MG tablet  Commonly known as: ZANAFLEX   2 mg, Oral, Daily  PRN             Stop These Medications      Umeclidinium-Vilanterol 62.5-25 MCG/ACT aerosol powder  inhaler  Commonly known as: ANORO ELLIPTA              Allergies   Allergen Reactions    Daliresp [Roflumilast] Anaphylaxis    Latex Rash    Sulfa Antibiotics Rash         Discharge Disposition:  Skilled Nursing Facility (DC - External)    Discharge Diet:  Diet Order   Procedures    Diet: Cardiac; Healthy Heart (2-3 Na+); Texture: Soft to Chew (NDD 3); Soft to Chew: Whole Meat; Fluid Consistency: Thin (IDDSI 0)       Discharge Activity:   Activity Instructions       Activity as Tolerated              CODE STATUS:    Code Status and Medical Interventions: CPR (Attempt to Resuscitate); Full Support   Ordered at: 11/25/24 0218     Code Status (Patient has no pulse and is not breathing):    CPR (Attempt to Resuscitate)     Medical Interventions (Patient has pulse or is breathing):    Full Support       No future appointments.   Contact information for follow-up providers       Alfonso Goldstein MD .    Specialty: Family Medicine  Contact information:  [address] [place], [place] [address]  [phone]                       Contact information for after-discharge care       Destination       [facility] .    Service: Nursing Home  Contact information:  [address] [place] [address]  [phone]                                   Time Spent on Discharge:  Greater than 30 minutes      Demetris Dos Santos MD  [facility]  11/27/24  14:35 EST               Electronically signed by Demetris Dos Santos MD at 11/27/24 1441       Discharge Order (From admission, onward)       Start     Ordered    11/27/24 1425  Discharge patient  Once        Expected Discharge Date: 11/27/24   Discharge Disposition: Skilled Nursing Facility (DC - External)   Physician of Record for Attribution - Please select from Treatment Team: DEMETRIS DOS SANTOS [[id number]]   Review needed by CMO to determine  Physician of Record: No      Question Answer Comment   Physician of Record for Attribution - Please select from Treatment Team DEMETRIS DOS SANTOS    Review needed by CMO to determine Physician of Record No        11/27/24 2542

## 2024-11-27 NOTE — DISCHARGE PLACEMENT REQUEST
"Tony Casey (84 y.o. Male)       Date of Birth   1940    Social Security Number       Address   [address] [place] [address] [address] [facility] [address]    Home Phone   [phone]    MRN   [id number]       Scientology   Buddhism    Marital Status   Single                            Admission Date   11/25/24    Admission Type   Emergency    Admitting Provider   Jorje Mcdermott MD    Attending Provider   Pop Hauser MD    Department, Room/Bed   [address], E454/1       Discharge Date       Discharge Disposition       Discharge Destination                                 Attending Provider: Pop Hauser MD    Allergies: Daliresp [Roflumilast], Latex, Sulfa Antibiotics    Isolation: None   Infection: None   Code Status: CPR    Ht: 180.3 cm (71\")   Wt: 84.7 kg (186 lb 11.7 oz)    Admission Cmt: None   Principal Problem: COPD with acute exacerbation [J44.1]                   Active Insurance as of 11/25/2024       Primary Coverage       Payor Plan Insurance Group Employer/Plan Group    HUMANA MEDICARE REPLACEMENT HUMANA MED ADV SNP PPO [id number]       Payor Plan Address Payor Plan Phone Number Payor Plan Fax Number Effective Dates    [address] [address] [phone]  11/1/2024 - None Entered    [facility] [address]         Subscriber Name Subscriber Birth Date Member ID       TONY CASEY 1940 [id number]               Secondary Coverage       Payor Plan Insurance Group Employer/Plan Group     FOR LIFE  FOR LIFE MC SUP         Payor Plan Address Payor Plan Phone Number Payor Plan Fax Number Effective Dates    [address] [phone]  3/10/2016 - None Entered    [facility] [address]         Subscriber Name Subscriber Birth Date Member ID       TONY CASEY 1940 [id number]                     Emergency Contacts        (Rel.) Home Phone Work Phone Mobile Phone    VICKY CASEY (Son) [phone] -- [phone]    " Frederick Leonard (Son) -- -- [phone]    Ricardo Leonard (Son) [phone] -- [phone]                Discharge Summary    No notes of this type exist for this encounter.       Discharge Order (From admission, onward)      None

## 2024-11-27 NOTE — PLAN OF CARE
Goal Outcome Evaluation:  Plan of Care Reviewed With: patient        Progress: improving  Outcome Evaluation: Vital signs stable. No c/o pain. Voided with bedside urinal, x1. Room air maintained during sleep. Patient safety maintained. Possible discharge to [facility], 11/27.

## 2024-11-27 NOTE — PLAN OF CARE
Goal Outcome Evaluation:  Plan of Care Reviewed With: patient           Outcome Evaluation: OT-No pain c/o at present. Pt. reports his R hip pain is slightly better. Pt. presents with generalized weakness and decreased ADL performance. LE dressing Alejandra seated EOB. Alejandra bed mobilty to sit EOB and Alejandra sit to stand. BSC transfer Alejandra  with verbal cues. Functional endurance fair. BUE therap. ex completed with pt. to increase activity tolerance for self care. Plan to cont. OT and progress as tolerate.

## 2024-11-27 NOTE — THERAPY TREATMENT NOTE
Patient Name: Tony Leonard  : 1940    MRN: [id number]                              Today's Date: 2024       Admit Date: 2024    Visit Dx:     ICD-10-CM ICD-9-CM   1. COPD with acute exacerbation  J44.1 491.21   2. Chronic anemia  D64.9 285.9     Patient Active Problem List   Diagnosis    Thrush, oral    ADD (attention deficit disorder)    Hemorrhoids    Bronchiectasis with acute lower respiratory infection    Diverticul disease small and large intestine, no perforati or abscess    Benign non-nodular prostatic hyperplasia without lower urinary tract symptoms    Gastroesophageal reflux disease    Malaise and fatigue    Environmental allergies    Hemoptysis    Dyslipidemia    Primary osteoarthritis involving multiple joints    Pneumonia of right lower lobe due to infectious organism    Abnormal EKG    COPD (chronic obstructive pulmonary disease)    Mild malnutrition    Acute on chronic respiratory failure with hypoxia    Fracture, intertrochanteric, right femur, closed, initial encounter    Chronic diastolic CHF (congestive heart failure)    Hematoma of frontal scalp    Postoperative anemia due to acute blood loss    Urinary retention    Hemorrhagic disorder due to circulating anticoagulants    History of fracture of right hip    Closed fracture of right hip with routine healing    Chronic low back pain with sciatica    Change in bowel function    Rectal bleeding    Prostate cancer    On home oxygen therapy    Acute viral bronchitis    Peripheral neuropathy    Plantar fasciitis    HTN (hypertension)    COPD exacerbation    Bilateral lower extremity edema    Microscopic hematuria    Acute midline low back pain with right-sided sciatica    Spinal stenosis, lumbar region with neurogenic claudication    Compression deformity of vertebra    Rectal bleed    Esophagitis    Angiectasia    Hiatal hernia    Overweight (BMI 25.0-29.9)    Leukocytosis    Bilateral hip pain    Elevated troponin level not due  myocardial infarction    Nocturnal hypoxia    Pneumonia of right upper lobe due to infectious organism    NSTEMI (non-ST elevated myocardial infarction)    Chronic respiratory failure with hypoxia    Paroxysmal atrial fibrillation    Acute exacerbation of chronic obstructive pulmonary disease (COPD)    Anemia    Non-compliant patient    Hypokalemia    Spondylolisthesis of lumbar region    Elevated troponin    Rhabdomyolysis    Multiple contusions    Fall    Dizziness    Contusion of hip    Pulmonary embolism    COPD with acute exacerbation     Past Medical History:   Diagnosis Date    ADHD (attention deficit hyperactivity disorder)     Bronchiectasis     CHF (congestive heart failure)     Colon polyp     COPD (chronic obstructive pulmonary disease)     Diverticulosis     Hard of hearing     On home oxygen therapy     2 LITERS    Pneumonia     Prostate cancer 04/22/2019    Spinal stenosis, lumbar region with neurogenic claudication 08/02/2022     Past Surgical History:   Procedure Laterality Date    BRONCHOSCOPY N/A 04/30/2016    Procedure: BRONCHOSCOPY with BAL of right lower lobe and left  lower lobe.  ;  Surgeon: Nando Diaz MD;  Location: [facility] ENDOSCOPY;  Service:     BRONCHOSCOPY N/A 04/28/2017    Procedure: BRONCHOSCOPY;  Surgeon: Katharina Saltre MD;  Location: [facility] ENDOSCOPY;  Service:     BRONCHOSCOPY Bilateral 06/29/2017    Procedure: BRONCHOSCOPY WITH WASHINGS;  Surgeon: Katharina Salter MD;  Location: [facility] ENDOSCOPY;  Service:     BRONCHOSCOPY N/A 01/22/2018    Procedure: BRONCHOSCOPY AT BEDSIDE with BAL;  Surgeon: Katharina Salter MD;  Location: [facility] ENDOSCOPY;  Service:     BRONCHOSCOPY N/A 01/30/2018    Procedure: BRONCHOSCOPY with BAL and washing;  Surgeon: Shreyas Wild MD;  Location: [facility] ENDOSCOPY;  Service:     BRONCHOSCOPY Bilateral 04/24/2018    Procedure: BRONCHOSCOPY WITH WASHING;  Surgeon: Katharina Salter MD;  Location: [facility] ENDOSCOPY;  Service: Pulmonary    BRONCHOSCOPY N/A  12/28/2019    Procedure: BRONCHOSCOPY with bilateral washing;  Surgeon: Katharina Salter MD;  Location: [facility] ENDOSCOPY;  Service: Pulmonary    BRONCHOSCOPY Bilateral 01/04/2023    Procedure: BRONCHOSCOPY with lavage;  Surgeon: Katharina Salter MD;  Location: [facility] ENDOSCOPY;  Service: Pulmonary;  Laterality: Bilateral;  mucus plugging    CARDIAC CATHETERIZATION N/A 01/29/2023    Procedure: Left Heart Cath;  Surgeon: Johnnie Ang MD;  Location: [facility] CATH INVASIVE LOCATION;  Service: Cardiology;  Laterality: N/A;    CARDIAC CATHETERIZATION N/A 01/29/2023    Procedure: Coronary angiography;  Surgeon: Johnnie Ang MD;  Location: [facility] CATH INVASIVE LOCATION;  Service: Cardiology;  Laterality: N/A;    COLONOSCOPY  05/17/2013    eh, ih, tort, sig tics    COLONOSCOPY N/A 05/10/2019    Non-thrombosed external hemorrhoids found on perianal exam, Diverticulosis, Tortuous colon, One 5 mm polyp in the mid ascending colon, IH. Path: Tubular adenoma.     COLONOSCOPY N/A 09/24/2022    Procedure: COLONOSCOPY to cecum and TI with argon plasma coagulation.;  Surgeon: Bruce Black MD;  Location: [facility] ENDOSCOPY;  Service: Gastroenterology;  Laterality: N/A;  pre- GI bleeding  post- radiation proctitis, diverticulosis    ENDOSCOPY  03/19/2015    z line irreg, hh    ENDOSCOPY N/A 09/24/2022    Procedure: ESOPHAGOGASTRODUODENOSCOPY;  Surgeon: Bruce Black MD;  Location: [facility] ENDOSCOPY;  Service: Gastroenterology;  Laterality: N/A;  pre- GI bleeding  post- esophagitis, hiatal hernia    HIP OPEN REDUCTION Right 01/17/2018    Procedure: HIP OPEN REDUCTION INTERNAL FIXATION WITH DYNAMIC HIP SCREW;  Surgeon: Les Black MD;  Location: [facility] MAIN OR;  Service:     SPINE SURGERY      TONSILLECTOMY      TOTAL HIP ARTHROPLASTY REVISION Right 09/23/2019    Procedure: HIP  REVISION RIGHT;  Surgeon: Isauro Warner MD;  Location: [facility] MAIN OR;  Service: Orthopedics      General Information       Row Name  11/27/24 1319          OT Time and Intention    Subjective Information no complaints  -CW     Document Type therapy note (daily note)  -CW     Mode of Treatment occupational therapy  -CW     Patient Effort good  -CW       Row Name 11/27/24 1319          General Information    Patient Profile Reviewed yes  -CW     Existing Precautions/Restrictions fall  -CW       Row Name 11/27/24 1319          Cognition    Orientation Status (Cognition) oriented x 3  -CW               User Key  (r) = Recorded By, (t) = Taken By, (c) = Cosigned By      Initials Name Provider Type    CW Lacy Espana OTR Occupational Therapist                     Mobility/ADL's       Row Name 11/27/24 1320          Bed Mobility    Bed Mobility supine-sit  -CW     Supine-Sit [place] (Bed Mobility) minimum assist (75% patient effort);verbal cues  -CW     Assistive Device (Bed Mobility) bed rails;head of bed elevated  -CW       Row Name 11/27/24 1320          Transfers    Transfers toilet transfer  -CW       Row Name 11/27/24 1320          Sit-Stand Transfer    Sit-Stand [place] (Transfers) minimum assist (75% patient effort);verbal cues  -CW       Row Name 11/27/24 1320          Toilet Transfer    [place] Level (Toilet Transfer) minimum assist (75% patient effort);verbal cues  -CW     Assistive Device (Toilet Transfer) commode, 3-in-1  -CW       Row Name 11/27/24 1320          Activities of Daily Living    BADL Assessment/Intervention lower body dressing;toileting  -CW       Row Name 11/27/24 1320          Toileting Assessment/Training    [place] Level (Toileting) toileting skills;adjust/manage clothing;minimum assist (75% patient effort);verbal cues  -CW     Position (Toileting) edge of bed sitting  -CW       Row Name 11/27/24 1320          Lower Body Dressing Assessment/Training    [place] Level (Lower Body Dressing) lower body dressing skills;doff;don;socks;verbal cues;minimum assist (75% patient effort)  -CW     Position  (Lower Body Dressing) edge of bed sitting  -CW               User Key  (r) = Recorded By, (t) = Taken By, (c) = Cosigned By      Initials Name Provider Type    Lacy Sanchez OTR Occupational Therapist                   Obj/Interventions       Row Name 11/27/24 1326          Shoulder (Therapeutic Exercise)    Shoulder (Therapeutic Exercise) AROM (active range of motion)  -CW     Shoulder AROM (Therapeutic Exercise) bilateral;flexion;extension;10 repetitions;2 sets  -       Row Name 11/27/24 1326          Elbow/Forearm (Therapeutic Exercise)    Elbow/Forearm (Therapeutic Exercise) AROM (active range of motion)  -CW     Elbow/Forearm AROM (Therapeutic Exercise) bilateral;flexion;extension;supination;pronation;10 repetitions;2 sets  -       Row Name 11/27/24 1326          Wrist (Therapeutic Exercise)    Wrist (Therapeutic Exercise) AROM (active range of motion)  -CW     Wrist AROM (Therapeutic Exercise) bilateral;flexion;extension;10 repetitions;2 sets  -       Row Name 11/27/24 1326          Hand (Therapeutic Exercise)    Hand (Therapeutic Exercise) AROM (active range of motion)  -CW     Hand AROM/AAROM (Therapeutic Exercise) bilateral;AROM (active range of motion);finger flexion;finger extension;10 repetitions;2 sets  -       Row Name 11/27/24 1326          Motor Skills    Motor Skills functional endurance  -CW     Functional Endurance fair  -CW     Therapeutic Exercise shoulder;elbow/forearm;wrist;hand  -CW               User Key  (r) = Recorded By, (t) = Taken By, (c) = Cosigned By      Initials Name Provider Type    Lacy Sanchez OTR Occupational Therapist                   Goals/Plan    No documentation.                  Clinical Impression       Row Name 11/27/24 1328          Pain Assessment    Pretreatment Pain Rating 0/10 - no pain  -CW     Posttreatment Pain Rating 0/10 - no pain  -       Row Name 11/27/24 1328          Plan of Care Review    Plan of Care Reviewed With patient  -CW      Outcome Evaluation OT-No pain c/o at present. Pt. reports his R hip pain is slightly better. Pt. presents with generalized weakness and decreased ADL performance. LE dressing Alejandra seated EOB. Alejandra bed mobilty to sit EOB and Alejandra sit to stand. BSC transfer Alejandra  with verbal cues. Functional endurance fair. BUE therap. ex completed with pt. to increase activity tolerance for self care. Plan to cont. OT and progress as tolerate.  -CW       Row Name 11/27/24 1328          Positioning and Restraints    Pre-Treatment Position in bed  -CW     Post Treatment Position bsc  -CW     On BS commode with nsg;encouraged to call for assist;call light within reach;sitting  -CW               User Key  (r) = Recorded By, (t) = Taken By, (c) = Cosigned By      Initials Name Provider Type    Lacy Sanchez OTR Occupational Therapist                   Outcome Measures       Row Name 11/27/24 3926          How much help from another is currently needed...    Putting on and taking off regular lower body clothing? 2  -CW     Bathing (including washing, rinsing, and drying) 2  -CW     Toileting (which includes using toilet bed pan or urinal) 2  -CW     Putting on and taking off regular upper body clothing 3  -CW     Taking care of personal grooming (such as brushing teeth) 3  -CW     Eating meals 3  -CW     AM-PAC 6 Clicks Score (OT) 15  -CW               User Key  (r) = Recorded By, (t) = Taken By, (c) = Cosigned By      Initials Name Provider Type    Lacy Sanchez OTR Occupational Therapist                    Occupational Therapy Education       Title: PT OT SLP Therapies (Done)       Topic: Occupational Therapy (Done)       Point: ADL training (Done)       Description:   Instruct learner(s) on proper safety adaptation and remediation techniques during self care or transfers.   Instruct in proper use of assistive devices.                  Learning Progress Summary            Patient Acceptance, E, VU by MARILEE at 11/27/2024 0445     Acceptance, E, VU,DU by KR at 11/26/2024 1547                      Point: Home exercise program (Done)       Description:   Instruct learner(s) on appropriate technique for monitoring, assisting and/or progressing therapeutic exercises/activities.                  Learning Progress Summary            Patient Acceptance, E, VU by MARILEE at 11/27/2024 1335    Acceptance, E, VU,DU by KR at 11/26/2024 1547                      Point: Precautions (Done)       Description:   Instruct learner(s) on prescribed precautions during self-care and functional transfers.                  Learning Progress Summary            Patient Acceptance, E, VU by MARILEE at 11/27/2024 1335    Acceptance, E, VU,DU by KR at 11/26/2024 1547                                      User Key       Initials Effective Dates Name Provider Type Discipline    MARILEE 06/16/21 -  Lacy Espana OTR Occupational Therapist OT    WHITNEY 06/25/24 -  Dejah Jimenez OT Occupational Therapist OT                  OT Recommendation and Plan     Plan of Care Review  Plan of Care Reviewed With: patient  Outcome Evaluation: OT-No pain c/o at present. Pt. reports his R hip pain is slightly better. Pt. presents with generalized weakness and decreased ADL performance. LE dressing Alejandra seated EOB. Alejandra bed mobilty to sit EOB and Alejandra sit to stand. BSC transfer Alejandra  with verbal cues. Functional endurance fair. BUE therap. ex completed with pt. to increase activity tolerance for self care. Plan to cont. OT and progress as tolerate.     Time Calculation:         Time Calculation- OT       Row Name 11/27/24 1335             Time Calculation- OT    OT Start Time 1120  -CW      OT Stop Time 1145  -CW      OT Time Calculation (min) 25 min  -CW      Total Timed Code Minutes- OT 25 minute(s)  -CW         Timed Charges    95405 - OT Therapeutic Exercise Minutes 10  -CW      41245 - OT Self Care/Mgmt Minutes 15  -CW         Total Minutes    Timed Charges Total Minutes 25  -CW       Total Minutes 25   -MARILEE                User Key  (r) = Recorded By, (t) = Taken By, (c) = Cosigned By      Initials Name Provider Type    Lacy Sanchez OTR Occupational Therapist                  Therapy Charges for Today       Code Description Service Date Service Provider Modifiers Qty    [id number]  OT THER PROC EA 15 MIN 11/27/2024 Lacy Espana OTR GO 1    [id number]  OT SELF CARE/MGMT/TRAIN EA 15 MIN 11/27/2024 Lacy Espana OTR GO 1                 JEREMI Pinto  11/27/2024

## 2024-11-27 NOTE — PROGRESS NOTES
"  Daily Progress Note.   [address]  11/27/2024    Patient:  Name:  Tony Leonard  MRN:  [id number]  1940  84 y.o.  male         Requesting physician: Dr. Jorje Mcdermott     Reason for Consultation: Shortness of breath acute exacerbation of underlying COPD/eosinophilia     CC: Shortness of breath    Interval History:  Awake alert says breathing feels better  Maintains on room air afebrile no lethargy no confusion  No worsening shortness of breath cough sputum production fever or chills  Tolerating a diet no emesis.  Saturation 100% on room air  Tolerating a diet no emesis no abdominal pain      Physical Exam:  BP 98/53 (BP Location: Left arm, Patient Position: Lying)   Pulse 88   Temp 97.5 °F (36.4 °C) (Oral)   Resp 16   Ht 180.3 cm (71\")   Wt 84.7 kg (186 lb 11.7 oz)   SpO2 95%   BMI 26.04 kg/m²   Body mass index is 26.04 kg/m².    Intake/Output Summary (Last 24 hours) at 11/27/2024 0931  Last data filed at 11/27/2024 0913  Gross per 24 hour   Intake 300 ml   Output 1000 ml   Net -700 ml     General appearance: Appears stated age, conversant   Eyes: Anicteric sclerae, moist conjunctivae; no lid lag;   HENT: Atraumatic; oropharynx clear with moist mucous membranes and no mucosal ulcerations; normal hard and soft palate  Neck: Trachea midline; supple  Lungs: No wheeze no rhonchi, with normal respiratory effort and no intercostal retractions  CV: RRR, no rub  Abdomen: Soft, nontender; no masses or HSM  Extremities: Mild peripheral edema  Skin:  warm dry well-perfused  Psych: Appropriate affect, alert neuro cranials 2 through 12 grossly intact speech intact moves all extremities    Data Review:  Notable Labs:  Results from last 7 days   Lab Units 11/26/24  0820 11/25/24  0358 11/25/24  0043   WBC 10*3/mm3 7.56 6.67 7.55   HEMOGLOBIN g/dL 9.3* 9.4* 9.1*   PLATELETS 10*3/mm3 312 290 305     Results from last 7 days   Lab Units 11/26/24  0819 11/25/24  0358 11/25/24  0043   SODIUM " mmol/L 140 139 138   POTASSIUM mmol/L 3.7 3.9 4.5   CHLORIDE mmol/L 105 103 102   CO2 mmol/L 23.0 25.5 25.8   BUN mg/dL 16 14 15   CREATININE mg/dL 0.88 0.92 1.03   GLUCOSE mg/dL 135* 119* 107*   CALCIUM mg/dL 8.8 8.8 8.7   MAGNESIUM mg/dL 2.4  --   --    Estimated Creatinine Clearance: 72.2 mL/min (by C-G formula based on SCr of 0.88 mg/dL).    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  Cont po pred 20  Scheduled DuoNebs Brovana budesonide hypertonic saline  Flutter valve  Incentive spirometer  Azithromycin for acute exacerbation     Patient of Dr. Salter in our office.    No pulmonary contraindication for discharge.     Electronically signed by Scott Bob MD, 11/27/24, 10:10 AM EST.

## 2024-11-27 NOTE — DISCHARGE SUMMARY
Patient Name: Tony Leonard  : 1940  MRN: [id number]    Date of Admission: 2024  Date of Discharge:  2024  Primary Care Physician: Alfonso Goldstein MD      Chief Complaint:   Shortness of Breath      Discharge Diagnoses     Active Hospital Problems    Diagnosis  POA    Chronic diastolic CHF (congestive heart failure) [I50.32]  Yes    Gastroesophageal reflux disease [K21.9]  Yes      Resolved Hospital Problems    Diagnosis Date Resolved POA    **COPD with acute exacerbation [J44.1] 2024 Yes    COPD exacerbation [J44.1] 2024 Yes        Hospital Course     Mr. Leonard is a 84 y.o. male with a history of COPD/eosinophilic asthma/bronchiectasis, chronic diastolic heart failure, GERD who presented to [facility] initially complaining of shortness of breath and wheezing.  Please see the admitting history and physical for further details.  He was found to have an acute exacerbation of COPD/bronchiectasis/eosinophilic asthma and was admitted to the hospital for further evaluation and treatment.      He was seen in consultation by pulmonology and started on steroids, nebulizers, azithromycin, and mucolytics.  His symptoms improved rapidly, and he has been cleared for discharge back to his facility today.  He will complete a short course of azithromycin and oral steroids at his facility, and I will change his an Anoro Ellipta to Trelegy Ellipta at discharge.  He also is mildly hypokalemic today, and so we will discharge him with a couple of doses of potassium.    He was also noted to have some iron deficiency, and I have started him on oral iron at discharge.    Day of Discharge     Subjective:  No events overnight. No complaints. His labs came back late today after the decision to discharge him had already been made and they show some hypokalemia. We'll send him home with some oral potassium for a few doses to rectify this.    Physical Exam:  Temp:  [97.5 °F (36.4  °C)-98.4 °F (36.9 °C)] 97.5 °F (36.4 °C)  Heart Rate:  [86-97] 86  Resp:  [16-18] 16  BP: ()/(53-74) 130/64  Body mass index is 26.04 kg/m².  Physical Exam  Constitutional:       General: He is not in acute distress.     Appearance: He is not toxic-appearing.   Cardiovascular:      Rate and Rhythm: Normal rate and regular rhythm.      Heart sounds: Normal heart sounds.   Pulmonary:      Effort: Pulmonary effort is normal. No respiratory distress.      Breath sounds: Normal breath sounds. No wheezing, rhonchi or rales.   Abdominal:      General: Bowel sounds are normal.      Palpations: Abdomen is soft.   Musculoskeletal:         General: No tenderness.      Right lower leg: No edema.      Left lower leg: No edema.   Neurological:      Mental Status: He is alert.   Psychiatric:         Mood and Affect: Mood normal.         Behavior: Behavior normal.         Consultants     Consult Orders (all) (From admission, onward)       Start     Ordered    11/25/24 1042  Inpatient Pulmonology Consult  Once        Specialty:  Pulmonary Disease  Provider:  Katharina Salter MD    11/25/24 1041    11/25/24 0406  Inpatient Spiritual Care Consult  Once        Provider:  (Not yet assigned)    11/25/24 0406    11/25/24 0406  Inpatient Case Management  Consult  Once        Provider:  (Not yet assigned)    11/25/24 0406    11/25/24 0204  [facility] (on-call MD unless specified) Details  Once        Specialty:  Hospitalist  Provider:  (Not yet assigned)    11/25/24 0204                  Procedures     Imaging Results (All)       Procedure Component Value Units Date/Time    XR Chest 1 View [[id number]] Collected: 11/25/24 0121     Updated: 11/25/24 0125    Narrative:      SINGLE VIEW OF THE CHEST     HISTORY: Dyspnea     COMPARISON: April 19, 2024     FINDINGS:  There is cardiomegaly. There is vascular congestion. There is  calcification of the aorta. There is a small left pleural effusion. No  pneumothorax is seen. Lung volumes  "remain diminished. There are  background changes of COPD.       Impression:      Cardiomegaly with suspected vascular congestion.     This report was finalized on 11/25/2024 1:22 AM by Dr. Danielle Arango M.D on Workstation: [id number]               Pertinent Labs     Results from last 7 days   Lab Units 11/27/24  0913 11/26/24  0820 11/25/24 0358 11/25/24  0043   WBC 10*3/mm3 9.41 7.56 6.67 7.55   HEMOGLOBIN g/dL 9.5* 9.3* 9.4* 9.1*   PLATELETS 10*3/mm3 319 312 290 305     Results from last 7 days   Lab Units 11/27/24  0913 11/26/24  0819 11/25/24 0358 11/25/24  0043   SODIUM mmol/L 142 140 139 138   POTASSIUM mmol/L 3.0* 3.7 3.9 4.5   CHLORIDE mmol/L 101 105 103 102   CO2 mmol/L 25.4 23.0 25.5 25.8   BUN mg/dL 26* 16 14 15   CREATININE mg/dL 0.89 0.88 0.92 1.03   GLUCOSE mg/dL 173* 135* 119* 107*   Estimated Creatinine Clearance: 71.4 mL/min (by C-G formula based on SCr of 0.89 mg/dL).  Results from last 7 days   Lab Units 11/25/24  0043   ALBUMIN g/dL 3.4*   BILIRUBIN mg/dL 0.4   ALK PHOS U/L 71   AST (SGOT) U/L 31   ALT (SGPT) U/L 11     Results from last 7 days   Lab Units 11/27/24  0913 11/26/24  0819 11/25/24 0358 11/25/24  0043   CALCIUM mg/dL 8.5* 8.8 8.8 8.7   ALBUMIN g/dL  --   --   --  3.4*   MAGNESIUM mg/dL  --  2.4  --   --        Results from last 7 days   Lab Units 11/25/24  0553 11/25/24 0358 11/25/24  0136 11/25/24  0043   HSTROP T ng/L 28* 33* 38*  --    PROBNP pg/mL  --   --   --  410.0           Invalid input(s): \"LDLCALC\"        Test Results Pending at Discharge       Discharge Details        Discharge Medications        New Medications        Instructions Start Date   azithromycin 250 MG tablet  Commonly known as: ZITHROMAX   Indications: Worsening of Chronic Obstructive Lung Disease   Start Date: November 28, 2024     ferrous sulfate 325 (65 FE) MG tablet   325 mg, Oral, Daily With Breakfast   Start Date: November 28, 2024     potassium chloride 20 MEQ CR tablet  Commonly known as: " KLOR-CON M20   40 mEq, Oral, Every 4 Hours      predniSONE 20 MG tablet  Commonly known as: DELTASONE   20 mg, Oral, Daily With Breakfast   Start Date: November 28, 2024     Trelegy Ellipta 100-62.5-25 MCG/ACT inhaler  Generic drug: Fluticasone-Umeclidin-Vilant   1 puff, Inhalation, Daily - RT             Continue These Medications        Instructions Start Date   acetaminophen 325 MG tablet  Commonly known as: TYLENOL   650 mg, Oral, Every 4 Hours PRN      albuterol (2.5 MG/3ML) 0.083% nebulizer solution  Commonly known as: PROVENTIL   2.5 mg, Nebulization, Every 6 Hours PRN      calcium carbonate 500 MG chewable tablet  Commonly known as: TUMS   2 tablets, Every 4 Hours PRN      dicyclomine 10 MG capsule  Commonly known as: BENTYL   10 mg, Oral, 3 Times Daily PRN      FLUoxetine 20 MG capsule  Commonly known as: PROzac   TAKE 1 CAPSULE DAILY      furosemide 20 MG tablet  Commonly known as: LASIX   20 mg, 2 Times Daily      gabapentin 300 MG capsule  Commonly known as: NEURONTIN   300 mg, Oral, 3 Times Daily      guaiFENesin 600 MG 12 hr tablet  Commonly known as: MUCINEX   1,200 mg, 2 Times Daily      Melatonin 3 MG capsule   3 mg, As Needed      Multivitamin Adult tablet tablet  Generic drug: multivitamin with minerals   1 tablet, Oral, Daily      ondansetron ODT 4 MG disintegrating tablet  Commonly known as: ZOFRAN-ODT   4 mg, Translingual, Every 6 Hours PRN      pantoprazole 40 MG EC tablet  Commonly known as: PROTONIX   40 mg, Oral, Daily      sodium chloride 7 % nebulizer solution nebulizer solution   4 mL, Nebulization, Every 4 Hours PRN      sucralfate 1 g tablet  Commonly known as: Carafate   1 g, Oral, 4 Times Daily      tiZANidine 2 MG tablet  Commonly known as: ZANAFLEX   2 mg, Oral, Daily PRN             Stop These Medications      Umeclidinium-Vilanterol 62.5-25 MCG/ACT aerosol powder  inhaler  Commonly known as: ANORO ELLIPTA              Allergies   Allergen Reactions    Daliresp [Roflumilast]  Anaphylaxis    Latex Rash    Sulfa Antibiotics Rash         Discharge Disposition:  Skilled Nursing Facility (DC - External)    Discharge Diet:  Diet Order   Procedures    Diet: Cardiac; Healthy Heart (2-3 Na+); Texture: Soft to Chew (NDD 3); Soft to Chew: Whole Meat; Fluid Consistency: Thin (IDDSI 0)       Discharge Activity:   Activity Instructions       Activity as Tolerated              CODE STATUS:    Code Status and Medical Interventions: CPR (Attempt to Resuscitate); Full Support   Ordered at: 11/25/24 0218     Code Status (Patient has no pulse and is not breathing):    CPR (Attempt to Resuscitate)     Medical Interventions (Patient has pulse or is breathing):    Full Support       No future appointments.   Contact information for follow-up providers       Alfonso Goldstein MD .    Specialty: Family Medicine  Contact information:  [address]  [address] [address] [phone]                       Contact information for after-discharge care       Destination       [facility] .    Service: Nursing Home  Contact information:  [address] [id number] [phone]                                   Time Spent on Discharge:  Greater than 30 minutes      Pop Hauser MD  [facility]  11/27/24  14:35 EST

## 2024-11-27 NOTE — PLAN OF CARE
Goal Outcome Evaluation:              Outcome Evaluation: Pt D/C back to [facility]. Report given to ANUSHA Joshi. AVS in package    Clarified with Dr Hauser regarding Azithromycin directions of 250 mg PO Daily for 4 days, per Dr Hauser. ANUSHA Joshi informed

## 2024-11-27 NOTE — CASE MANAGEMENT/SOCIAL WORK
Case Management Discharge Note      Final Note: Return to [facility] Medicaid bed hold. Transport by Daniel feldman today at 1600 (No transport available with  Reema Feldman). Called and notified son of D/C plan and he is in agreement         Selected Continued Care - Admitted Since 11/25/2024       Destination Coordination complete.      Service Provider Services Address Phone Fax Patient Preferred    [place] [id number] [phone]               Durable Medical Equipment    No services have been selected for the patient.                Dialysis/Infusion    No services have been selected for the patient.                Home Medical Care    No services have been selected for the patient.                Therapy    No services have been selected for the patient.                Community Resources    No services have been selected for the patient.                Community & DME    No services have been selected for the patient.                    Transportation Services  W[facility]C Van: [facility]    Final Discharge Disposition Code: 04 - intermediate care facility

## 2025-02-16 ENCOUNTER — APPOINTMENT (OUTPATIENT)
Dept: CT IMAGING | Facility: HOSPITAL | Age: 85
End: 2025-02-16
Payer: MEDICARE

## 2025-02-16 ENCOUNTER — HOSPITAL ENCOUNTER (INPATIENT)
Facility: HOSPITAL | Age: 85
LOS: 1 days | Discharge: NURSING, REHAB OR LONG-TERM CARE | End: 2025-02-19
Attending: EMERGENCY MEDICINE | Admitting: INTERNAL MEDICINE
Payer: MEDICARE

## 2025-02-16 ENCOUNTER — APPOINTMENT (OUTPATIENT)
Dept: GENERAL RADIOLOGY | Facility: HOSPITAL | Age: 85
End: 2025-02-16
Payer: MEDICARE

## 2025-02-16 DIAGNOSIS — J10.1 INFLUENZA A: Primary | ICD-10-CM

## 2025-02-16 PROBLEM — R41.82 ALTERED MENTAL STATUS: Status: ACTIVE | Noted: 2025-02-16

## 2025-02-16 LAB
ALBUMIN SERPL-MCNC: 3.6 G/DL (ref 3.5–5.2)
ALBUMIN/GLOB SERPL: 1.4 G/DL
ALP SERPL-CCNC: 55 U/L (ref 39–117)
ALT SERPL W P-5'-P-CCNC: 21 U/L (ref 1–41)
ANION GAP SERPL CALCULATED.3IONS-SCNC: 11.1 MMOL/L (ref 5–15)
ARTERIAL PATENCY WRIST A: POSITIVE
AST SERPL-CCNC: 26 U/L (ref 1–40)
ATMOSPHERIC PRESS: 745.7 MMHG
B PARAPERT DNA SPEC QL NAA+PROBE: NOT DETECTED
B PERT DNA SPEC QL NAA+PROBE: NOT DETECTED
BACTERIA UR QL AUTO: NORMAL /HPF
BASE EXCESS BLDA CALC-SCNC: 2.9 MMOL/L (ref 0–2)
BASOPHILS # BLD AUTO: 0.01 10*3/MM3 (ref 0–0.2)
BASOPHILS NFR BLD AUTO: 0.1 % (ref 0–1.5)
BDY SITE: ABNORMAL
BILIRUB SERPL-MCNC: 0.5 MG/DL (ref 0–1.2)
BILIRUB UR QL STRIP: NEGATIVE
BUN SERPL-MCNC: 19 MG/DL (ref 8–23)
BUN/CREAT SERPL: 18.3 (ref 7–25)
C PNEUM DNA NPH QL NAA+NON-PROBE: NOT DETECTED
CALCIUM SPEC-SCNC: 8.4 MG/DL (ref 8.6–10.5)
CHLORIDE SERPL-SCNC: 102 MMOL/L (ref 98–107)
CLARITY UR: CLEAR
CO2 BLDA-SCNC: 29.2 MMOL/L (ref 23–27)
CO2 SERPL-SCNC: 25.9 MMOL/L (ref 22–29)
COLOR UR: YELLOW
CREAT SERPL-MCNC: 1.04 MG/DL (ref 0.76–1.27)
D-LACTATE SERPL-SCNC: 1.9 MMOL/L (ref 0.5–2)
DEPRECATED RDW RBC AUTO: 64.4 FL (ref 37–54)
DEVICE COMMENT: ABNORMAL
EGFRCR SERPLBLD CKD-EPI 2021: 69.9 ML/MIN/1.73
EOSINOPHIL # BLD AUTO: 0 10*3/MM3 (ref 0–0.4)
EOSINOPHIL NFR BLD AUTO: 0 % (ref 0.3–6.2)
ERYTHROCYTE [DISTWIDTH] IN BLOOD BY AUTOMATED COUNT: 19.9 % (ref 12.3–15.4)
FLUAV H1 2009 PAND RNA NPH QL NAA+PROBE: DETECTED
FLUBV RNA ISLT QL NAA+PROBE: NOT DETECTED
GAS FLOW AIRWAY: 2 LPM
GEN 5 1HR TROPONIN T REFLEX: 45 NG/L
GLOBULIN UR ELPH-MCNC: 2.5 GM/DL
GLUCOSE SERPL-MCNC: 90 MG/DL (ref 65–99)
GLUCOSE UR STRIP-MCNC: NEGATIVE MG/DL
HADV DNA SPEC NAA+PROBE: NOT DETECTED
HCO3 BLDA-SCNC: 27.8 MMOL/L (ref 22–28)
HCOV 229E RNA SPEC QL NAA+PROBE: NOT DETECTED
HCOV HKU1 RNA SPEC QL NAA+PROBE: NOT DETECTED
HCOV NL63 RNA SPEC QL NAA+PROBE: NOT DETECTED
HCOV OC43 RNA SPEC QL NAA+PROBE: NOT DETECTED
HCT VFR BLD AUTO: 39.5 % (ref 37.5–51)
HEMODILUTION: NO
HGB BLD-MCNC: 13.3 G/DL (ref 13–17.7)
HGB UR QL STRIP.AUTO: ABNORMAL
HMPV RNA NPH QL NAA+NON-PROBE: NOT DETECTED
HPIV1 RNA ISLT QL NAA+PROBE: NOT DETECTED
HPIV2 RNA SPEC QL NAA+PROBE: NOT DETECTED
HPIV3 RNA NPH QL NAA+PROBE: NOT DETECTED
HPIV4 P GENE NPH QL NAA+PROBE: NOT DETECTED
HYALINE CASTS UR QL AUTO: NORMAL /LPF
IMM GRANULOCYTES # BLD AUTO: 0.05 10*3/MM3 (ref 0–0.05)
IMM GRANULOCYTES NFR BLD AUTO: 0.6 % (ref 0–0.5)
KETONES UR QL STRIP: ABNORMAL
LEUKOCYTE ESTERASE UR QL STRIP.AUTO: NEGATIVE
LYMPHOCYTES # BLD AUTO: 0.8 10*3/MM3 (ref 0.7–3.1)
LYMPHOCYTES NFR BLD AUTO: 9.6 % (ref 19.6–45.3)
M PNEUMO IGG SER IA-ACNC: NOT DETECTED
MCH RBC QN AUTO: 30.4 PG (ref 26.6–33)
MCHC RBC AUTO-ENTMCNC: 33.7 G/DL (ref 31.5–35.7)
MCV RBC AUTO: 90.4 FL (ref 79–97)
MODALITY: ABNORMAL
MONOCYTES # BLD AUTO: 1.55 10*3/MM3 (ref 0.1–0.9)
MONOCYTES NFR BLD AUTO: 18.5 % (ref 5–12)
NEUTROPHILS NFR BLD AUTO: 5.96 10*3/MM3 (ref 1.7–7)
NEUTROPHILS NFR BLD AUTO: 71.2 % (ref 42.7–76)
NITRITE UR QL STRIP: NEGATIVE
NRBC BLD AUTO-RTO: 0 /100 WBC (ref 0–0.2)
NT-PROBNP SERPL-MCNC: 1099 PG/ML (ref 0–1800)
PCO2 BLDA: 43.2 MM HG (ref 35–45)
PH BLDA: 7.42 PH UNITS (ref 7.35–7.45)
PH UR STRIP.AUTO: 6 [PH] (ref 5–8)
PLATELET # BLD AUTO: 188 10*3/MM3 (ref 140–450)
PMV BLD AUTO: 9 FL (ref 6–12)
PO2 BLDA: 72.4 MM HG (ref 80–100)
POTASSIUM SERPL-SCNC: 3.4 MMOL/L (ref 3.5–5.2)
PROCALCITONIN SERPL-MCNC: 0.11 NG/ML (ref 0–0.25)
PROT SERPL-MCNC: 6.1 G/DL (ref 6–8.5)
PROT UR QL STRIP: NEGATIVE
QT INTERVAL: 342 MS
QTC INTERVAL: 434 MS
RBC # BLD AUTO: 4.37 10*6/MM3 (ref 4.14–5.8)
RBC # UR STRIP: NORMAL /HPF
REF LAB TEST METHOD: NORMAL
RHINOVIRUS RNA SPEC NAA+PROBE: NOT DETECTED
RSV RNA NPH QL NAA+NON-PROBE: NOT DETECTED
SAO2 % BLDCOA: 94.5 % (ref 92–98.5)
SARS-COV-2 RNA NPH QL NAA+NON-PROBE: NOT DETECTED
SODIUM SERPL-SCNC: 139 MMOL/L (ref 136–145)
SP GR UR STRIP: 1.01 (ref 1–1.03)
SQUAMOUS #/AREA URNS HPF: NORMAL /HPF
TOTAL RATE: 18 BREATHS/MINUTE
TROPONIN T % DELTA: -2
TROPONIN T NUMERIC DELTA: -1 NG/L
TROPONIN T SERPL HS-MCNC: 46 NG/L
UROBILINOGEN UR QL STRIP: ABNORMAL
WBC # UR STRIP: NORMAL /HPF
WBC NRBC COR # BLD AUTO: 8.37 10*3/MM3 (ref 3.4–10.8)

## 2025-02-16 PROCEDURE — 84484 ASSAY OF TROPONIN QUANT: CPT | Performed by: EMERGENCY MEDICINE

## 2025-02-16 PROCEDURE — 25010000002 ENOXAPARIN PER 10 MG: Performed by: INTERNAL MEDICINE

## 2025-02-16 PROCEDURE — 85025 COMPLETE CBC W/AUTO DIFF WBC: CPT | Performed by: EMERGENCY MEDICINE

## 2025-02-16 PROCEDURE — 94640 AIRWAY INHALATION TREATMENT: CPT

## 2025-02-16 PROCEDURE — 80053 COMPREHEN METABOLIC PANEL: CPT | Performed by: EMERGENCY MEDICINE

## 2025-02-16 PROCEDURE — G0378 HOSPITAL OBSERVATION PER HR: HCPCS

## 2025-02-16 PROCEDURE — 99291 CRITICAL CARE FIRST HOUR: CPT

## 2025-02-16 PROCEDURE — 83880 ASSAY OF NATRIURETIC PEPTIDE: CPT | Performed by: EMERGENCY MEDICINE

## 2025-02-16 PROCEDURE — 81001 URINALYSIS AUTO W/SCOPE: CPT | Performed by: EMERGENCY MEDICINE

## 2025-02-16 PROCEDURE — 93005 ELECTROCARDIOGRAM TRACING: CPT | Performed by: EMERGENCY MEDICINE

## 2025-02-16 PROCEDURE — 25010000002 METHYLPREDNISOLONE PER 125 MG: Performed by: INTERNAL MEDICINE

## 2025-02-16 PROCEDURE — P9612 CATHETERIZE FOR URINE SPEC: HCPCS

## 2025-02-16 PROCEDURE — 94799 UNLISTED PULMONARY SVC/PX: CPT

## 2025-02-16 PROCEDURE — 70450 CT HEAD/BRAIN W/O DYE: CPT

## 2025-02-16 PROCEDURE — 0202U NFCT DS 22 TRGT SARS-COV-2: CPT | Performed by: EMERGENCY MEDICINE

## 2025-02-16 PROCEDURE — 36415 COLL VENOUS BLD VENIPUNCTURE: CPT

## 2025-02-16 PROCEDURE — 71045 X-RAY EXAM CHEST 1 VIEW: CPT

## 2025-02-16 PROCEDURE — 83605 ASSAY OF LACTIC ACID: CPT | Performed by: EMERGENCY MEDICINE

## 2025-02-16 PROCEDURE — 25810000003 SODIUM CHLORIDE 0.9 % SOLUTION: Performed by: EMERGENCY MEDICINE

## 2025-02-16 PROCEDURE — 36600 WITHDRAWAL OF ARTERIAL BLOOD: CPT | Performed by: EMERGENCY MEDICINE

## 2025-02-16 PROCEDURE — 82803 BLOOD GASES ANY COMBINATION: CPT | Performed by: EMERGENCY MEDICINE

## 2025-02-16 PROCEDURE — 93010 ELECTROCARDIOGRAM REPORT: CPT | Performed by: INTERNAL MEDICINE

## 2025-02-16 PROCEDURE — 84145 PROCALCITONIN (PCT): CPT | Performed by: EMERGENCY MEDICINE

## 2025-02-16 PROCEDURE — 25010000002 CEFTRIAXONE PER 250 MG: Performed by: INTERNAL MEDICINE

## 2025-02-16 RX ORDER — SODIUM CHLORIDE FOR INHALATION 7 %
4 VIAL, NEBULIZER (ML) INHALATION EVERY 4 HOURS PRN
Status: DISCONTINUED | OUTPATIENT
Start: 2025-02-16 | End: 2025-02-19 | Stop reason: HOSPADM

## 2025-02-16 RX ORDER — GUAIFENESIN 600 MG/1
600 TABLET, EXTENDED RELEASE ORAL EVERY 12 HOURS SCHEDULED
Status: DISCONTINUED | OUTPATIENT
Start: 2025-02-16 | End: 2025-02-19 | Stop reason: HOSPADM

## 2025-02-16 RX ORDER — NALOXONE HCL 0.4 MG/ML
0.4 VIAL (ML) INJECTION
Status: DISCONTINUED | OUTPATIENT
Start: 2025-02-16 | End: 2025-02-19 | Stop reason: HOSPADM

## 2025-02-16 RX ORDER — FUROSEMIDE 20 MG/1
20 TABLET ORAL 2 TIMES DAILY
Status: DISCONTINUED | OUTPATIENT
Start: 2025-02-16 | End: 2025-02-19 | Stop reason: HOSPADM

## 2025-02-16 RX ORDER — ENOXAPARIN SODIUM 100 MG/ML
40 INJECTION SUBCUTANEOUS NIGHTLY
Status: DISCONTINUED | OUTPATIENT
Start: 2025-02-16 | End: 2025-02-19 | Stop reason: HOSPADM

## 2025-02-16 RX ORDER — MORPHINE SULFATE 2 MG/ML
1 INJECTION, SOLUTION INTRAMUSCULAR; INTRAVENOUS EVERY 4 HOURS PRN
Status: DISCONTINUED | OUTPATIENT
Start: 2025-02-16 | End: 2025-02-19 | Stop reason: HOSPADM

## 2025-02-16 RX ORDER — ACETAMINOPHEN 325 MG/1
650 TABLET ORAL EVERY 4 HOURS PRN
Status: DISCONTINUED | OUTPATIENT
Start: 2025-02-16 | End: 2025-02-19 | Stop reason: HOSPADM

## 2025-02-16 RX ORDER — OSELTAMIVIR PHOSPHATE 30 MG/1
30 CAPSULE ORAL EVERY 12 HOURS SCHEDULED
Status: DISCONTINUED | OUTPATIENT
Start: 2025-02-17 | End: 2025-02-19 | Stop reason: HOSPADM

## 2025-02-16 RX ORDER — BISACODYL 5 MG/1
5 TABLET, DELAYED RELEASE ORAL DAILY PRN
Status: DISCONTINUED | OUTPATIENT
Start: 2025-02-16 | End: 2025-02-19 | Stop reason: HOSPADM

## 2025-02-16 RX ORDER — ONDANSETRON 4 MG/1
4 TABLET, ORALLY DISINTEGRATING ORAL EVERY 6 HOURS PRN
Status: DISCONTINUED | OUTPATIENT
Start: 2025-02-16 | End: 2025-02-19 | Stop reason: HOSPADM

## 2025-02-16 RX ORDER — GABAPENTIN 300 MG/1
300 CAPSULE ORAL 3 TIMES DAILY
Status: DISCONTINUED | OUTPATIENT
Start: 2025-02-16 | End: 2025-02-19 | Stop reason: HOSPADM

## 2025-02-16 RX ORDER — OSELTAMIVIR PHOSPHATE 75 MG/1
75 CAPSULE ORAL EVERY 12 HOURS SCHEDULED
Status: DISCONTINUED | OUTPATIENT
Start: 2025-02-16 | End: 2025-02-16

## 2025-02-16 RX ORDER — POLYETHYLENE GLYCOL 3350 17 G/17G
17 POWDER, FOR SOLUTION ORAL DAILY PRN
Status: DISCONTINUED | OUTPATIENT
Start: 2025-02-16 | End: 2025-02-19 | Stop reason: HOSPADM

## 2025-02-16 RX ORDER — BISACODYL 10 MG
10 SUPPOSITORY, RECTAL RECTAL DAILY PRN
Status: DISCONTINUED | OUTPATIENT
Start: 2025-02-16 | End: 2025-02-19 | Stop reason: HOSPADM

## 2025-02-16 RX ORDER — AMOXICILLIN 250 MG
2 CAPSULE ORAL 2 TIMES DAILY PRN
Status: DISCONTINUED | OUTPATIENT
Start: 2025-02-16 | End: 2025-02-19 | Stop reason: HOSPADM

## 2025-02-16 RX ORDER — PREDNISONE 20 MG/1
20 TABLET ORAL DAILY
Status: ON HOLD | COMMUNITY
End: 2025-02-19

## 2025-02-16 RX ORDER — POTASSIUM CHLORIDE 1.5 G/1.58G
20 POWDER, FOR SOLUTION ORAL DAILY
Status: ON HOLD | COMMUNITY

## 2025-02-16 RX ORDER — PANTOPRAZOLE SODIUM 40 MG/1
40 TABLET, DELAYED RELEASE ORAL DAILY
Status: DISCONTINUED | OUTPATIENT
Start: 2025-02-17 | End: 2025-02-19 | Stop reason: HOSPADM

## 2025-02-16 RX ORDER — MENTHOL AND ZINC OXIDE .44; 20.625 G/100G; G/100G
1 OINTMENT TOPICAL EVERY 12 HOURS SCHEDULED
Status: ON HOLD | COMMUNITY

## 2025-02-16 RX ORDER — IPRATROPIUM BROMIDE AND ALBUTEROL SULFATE 2.5; .5 MG/3ML; MG/3ML
3 SOLUTION RESPIRATORY (INHALATION)
Status: DISCONTINUED | OUTPATIENT
Start: 2025-02-16 | End: 2025-02-19 | Stop reason: HOSPADM

## 2025-02-16 RX ORDER — SUCRALFATE 1 G/1
1 TABLET ORAL 4 TIMES DAILY
Status: DISCONTINUED | OUTPATIENT
Start: 2025-02-16 | End: 2025-02-19 | Stop reason: HOSPADM

## 2025-02-16 RX ORDER — BUDESONIDE AND FORMOTEROL FUMARATE DIHYDRATE 160; 4.5 UG/1; UG/1
2 AEROSOL RESPIRATORY (INHALATION)
Status: DISCONTINUED | OUTPATIENT
Start: 2025-02-16 | End: 2025-02-19 | Stop reason: HOSPADM

## 2025-02-16 RX ORDER — ONDANSETRON 2 MG/ML
4 INJECTION INTRAMUSCULAR; INTRAVENOUS EVERY 6 HOURS PRN
Status: DISCONTINUED | OUTPATIENT
Start: 2025-02-16 | End: 2025-02-19 | Stop reason: HOSPADM

## 2025-02-16 RX ORDER — NITROGLYCERIN 0.4 MG/1
0.4 TABLET SUBLINGUAL
Status: DISCONTINUED | OUTPATIENT
Start: 2025-02-16 | End: 2025-02-19 | Stop reason: HOSPADM

## 2025-02-16 RX ORDER — FERROUS SULFATE 325(65) MG
325 TABLET ORAL
Status: DISCONTINUED | OUTPATIENT
Start: 2025-02-17 | End: 2025-02-19 | Stop reason: HOSPADM

## 2025-02-16 RX ORDER — METHYLPREDNISOLONE SODIUM SUCCINATE 125 MG/2ML
60 INJECTION, POWDER, LYOPHILIZED, FOR SOLUTION INTRAMUSCULAR; INTRAVENOUS EVERY 8 HOURS
Status: DISCONTINUED | OUTPATIENT
Start: 2025-02-16 | End: 2025-02-17

## 2025-02-16 RX ORDER — MULTIPLE VITAMINS W/ MINERALS TAB 9MG-400MCG
1 TAB ORAL DAILY
Status: DISCONTINUED | OUTPATIENT
Start: 2025-02-17 | End: 2025-02-19 | Stop reason: HOSPADM

## 2025-02-16 RX ORDER — POTASSIUM CHLORIDE 1.5 G/1.58G
20 POWDER, FOR SOLUTION ORAL DAILY
Status: DISCONTINUED | OUTPATIENT
Start: 2025-02-17 | End: 2025-02-19 | Stop reason: HOSPADM

## 2025-02-16 RX ORDER — FERROUS SULFATE 325(65) MG
325 TABLET ORAL
Status: ON HOLD | COMMUNITY

## 2025-02-16 RX ORDER — TRAMADOL HYDROCHLORIDE 50 MG/1
50 TABLET ORAL EVERY 6 HOURS PRN
Status: DISCONTINUED | OUTPATIENT
Start: 2025-02-16 | End: 2025-02-19 | Stop reason: HOSPADM

## 2025-02-16 RX ORDER — DICYCLOMINE HYDROCHLORIDE 10 MG/1
10 CAPSULE ORAL 3 TIMES DAILY PRN
Status: DISCONTINUED | OUTPATIENT
Start: 2025-02-16 | End: 2025-02-19 | Stop reason: HOSPADM

## 2025-02-16 RX ORDER — OSELTAMIVIR PHOSPHATE 75 MG/1
75 CAPSULE ORAL ONCE
Status: DISCONTINUED | OUTPATIENT
Start: 2025-02-16 | End: 2025-02-16

## 2025-02-16 RX ADMIN — IPRATROPIUM BROMIDE AND ALBUTEROL SULFATE 3 ML: 2.5; .5 SOLUTION RESPIRATORY (INHALATION) at 18:57

## 2025-02-16 RX ADMIN — SUCRALFATE 1 G: 1 TABLET ORAL at 21:39

## 2025-02-16 RX ADMIN — IPRATROPIUM BROMIDE AND ALBUTEROL SULFATE 3 ML: 2.5; .5 SOLUTION RESPIRATORY (INHALATION) at 23:16

## 2025-02-16 RX ADMIN — GABAPENTIN 300 MG: 300 CAPSULE ORAL at 21:39

## 2025-02-16 RX ADMIN — METHYLPREDNISOLONE SODIUM SUCCINATE 60 MG: 125 INJECTION, POWDER, FOR SOLUTION INTRAMUSCULAR; INTRAVENOUS at 18:15

## 2025-02-16 RX ADMIN — GUAIFENESIN 600 MG: 600 TABLET, MULTILAYER, EXTENDED RELEASE ORAL at 21:39

## 2025-02-16 RX ADMIN — FUROSEMIDE 20 MG: 20 TABLET ORAL at 21:39

## 2025-02-16 RX ADMIN — ENOXAPARIN SODIUM 40 MG: 100 INJECTION SUBCUTANEOUS at 21:39

## 2025-02-16 RX ADMIN — SODIUM CHLORIDE 500 ML: 9 INJECTION, SOLUTION INTRAVENOUS at 13:51

## 2025-02-16 RX ADMIN — CEFTRIAXONE SODIUM 1000 MG: 1 INJECTION, POWDER, FOR SOLUTION INTRAMUSCULAR; INTRAVENOUS at 18:15

## 2025-02-16 NOTE — ED NOTES
Nursing report ED to floor  Tony Leonard  84 y.o.  male    HPI :  HPI  Stated Reason for Visit: fatigue    Chief Complaint  Chief Complaint   Patient presents with    Fatigue    Weakness - Generalized       Admitting doctor:   Cedrick Chau MD    Admitting diagnosis:   The encounter diagnosis was Influenza A.    Code status:   Current Code Status       Date Active Code Status Order ID Comments User Context       Prior            Allergies:   Daliresp [roflumilast], Latex, and Sulfa antibiotics    Isolation:   Droplet    Intake and Output    Intake/Output Summary (Last 24 hours) at 2/16/2025 1520  Last data filed at 2/16/2025 1431  Gross per 24 hour   Intake 500 ml   Output --   Net 500 ml       Weight:   There were no vitals filed for this visit.    Most recent vitals:   Vitals:    02/16/25 1211 02/16/25 1241 02/16/25 1311 02/16/25 1427   BP: 113/61 116/58 103/56    BP Location:       Patient Position:       Pulse:  96 93 89   Resp:       Temp:       SpO2:  95% 92% 97%       Active LDAs/IV Access:   Lines, Drains & Airways       Active LDAs       Name Placement date Placement time Site Days    Peripheral IV 02/16/25 1140 Anterior;Left Forearm 02/16/25  1140  Forearm  less than 1    Peripheral IV 02/16/25 1100 Left;Posterior Hand 02/16/25  1100  Hand  less than 1                    Labs (abnormal labs have a star):   Labs Reviewed   RESPIRATORY PANEL PCR W/ COVID-19 (SARS-COV-2), NP SWAB IN UTM/VTP, 2 HR TAT - Abnormal; Notable for the following components:       Result Value    Influenza A H1 2009 PCR Detected (*)     All other components within normal limits    Narrative:     In the setting of a positive respiratory panel with a viral infection PLUS a negative procalcitonin without other underlying concern for bacterial infection, consider observing off antibiotics or discontinuation of antibiotics and continue supportive care. If the respiratory panel is positive for atypical bacterial infection  (Bordetella pertussis, Chlamydophila pneumoniae, or Mycoplasma pneumoniae), consider antibiotic de-escalation to target atypical bacterial infection.   COMPREHENSIVE METABOLIC PANEL - Abnormal; Notable for the following components:    Potassium 3.4 (*)     Calcium 8.4 (*)     All other components within normal limits    Narrative:     GFR Categories in Chronic Kidney Disease (CKD)      GFR Category          GFR (mL/min/1.73)    Interpretation  G1                     90 or greater         Normal or high (1)  G2                      60-89                Mild decrease (1)  G3a                   45-59                Mild to moderate decrease  G3b                   30-44                Moderate to severe decrease  G4                    15-29                Severe decrease  G5                    14 or less           Kidney failure          (1)In the absence of evidence of kidney disease, neither GFR category G1 or G2 fulfill the criteria for CKD.    eGFR calculation 2021 CKD-EPI creatinine equation, which does not include race as a factor   CBC WITH AUTO DIFFERENTIAL - Abnormal; Notable for the following components:    RDW 19.9 (*)     RDW-SD 64.4 (*)     Lymphocyte % 9.6 (*)     Monocyte % 18.5 (*)     Eosinophil % 0.0 (*)     Immature Grans % 0.6 (*)     Monocytes, Absolute 1.55 (*)     All other components within normal limits   URINALYSIS W/ CULTURE IF INDICATED - Abnormal; Notable for the following components:    Ketones, UA Trace (*)     Blood, UA Small (1+) (*)     All other components within normal limits    Narrative:     In absence of clinical symptoms, the presence of pyuria, bacteria, and/or nitrites on the urinalysis result does not correlate with infection.   TROPONIN - Abnormal; Notable for the following components:    HS Troponin T 46 (*)     All other components within normal limits    Narrative:     High Sensitive Troponin T Reference Range:  <14.0 ng/L- Negative Female for AMI  <22.0 ng/L- Negative Male  "for AMI  >=14 - Abnormal Female indicating possible myocardial injury.  >=22 - Abnormal Male indicating possible myocardial injury.   Clinicians would have to utilize clinical acumen, EKG, Troponin, and serial changes to determine if it is an Acute Myocardial Infarction or myocardial injury due to an underlying chronic condition.        BLOOD GAS, ARTERIAL - Abnormal; Notable for the following components:    pO2, Arterial 72.4 (*)     Base Excess, Arterial 2.9 (*)     CO2 Content 29.2 (*)     All other components within normal limits   HIGH SENSITIVITIY TROPONIN T 1HR - Abnormal; Notable for the following components:    HS Troponin T 45 (*)     All other components within normal limits    Narrative:     High Sensitive Troponin T Reference Range:  <14.0 ng/L- Negative Female for AMI  <22.0 ng/L- Negative Male for AMI  >=14 - Abnormal Female indicating possible myocardial injury.  >=22 - Abnormal Male indicating possible myocardial injury.   Clinicians would have to utilize clinical acumen, EKG, Troponin, and serial changes to determine if it is an Acute Myocardial Infarction or myocardial injury due to an underlying chronic condition.        LACTIC ACID, PLASMA - Normal   PROCALCITONIN - Normal    Narrative:     As a Marker for Sepsis (Non-Neonates):    1. <0.5 ng/mL represents a low risk of severe sepsis and/or septic shock.  2. >2 ng/mL represents a high risk of severe sepsis and/or septic shock.    As a Marker for Lower Respiratory Tract Infections that require antibiotic therapy:    PCT on Admission    Antibiotic Therapy       6-12 Hrs later    >0.5                Strongly Recommended  >0.25 - <0.5        Recommended   0.1 - 0.25          Discouraged              Remeasure/reassess PCT  <0.1                Strongly Discouraged     Remeasure/reassess PCT    As 28 day mortality risk marker: \"Change in Procalcitonin Result\" (>80% or <=80%) if Day 0 (or Day 1) and Day 4 values are available. Refer to " http://www.[facility]-pct-calculator.com    Change in PCT <=80%  A decrease of PCT levels below or equal to 80% defines a positive change in PCT test result representing a higher risk for 28-day all-cause mortality of patients diagnosed with severe sepsis for septic shock.    Change in PCT >80%  A decrease of PCT levels of more than 80% defines a negative change in PCT result representing a lower risk for 28-day all-cause mortality of patients diagnosed with severe sepsis or septic shock.      BNP (IN-HOUSE) - Normal    Narrative:     This assay is used as an aid in the diagnosis of individuals suspected of having heart failure. It can be used as an aid in the diagnosis of acute decompensated heart failure (ADHF) in patients presenting with signs and symptoms of ADHF to the emergency department (ED). In addition, NT-proBNP of <300 pg/mL indicates ADHF is not likely.    Age Range Result Interpretation  NT-proBNP Concentration (pg/mL:      <50             Positive            >450                   Gray                 300-450                    Negative             <300    50-75           Positive            >900                  Gray                300-900                  Negative            <300      >75             Positive            >1800                  Gray                300-1800                  Negative            <300   URINALYSIS, MICROSCOPIC ONLY   CBC AND DIFFERENTIAL    Narrative:     The following orders were created for panel order CBC & Differential.  Procedure                               Abnormality         Status                     ---------                               -----------         ------                     CBC Auto Differential[[id number]]        Abnormal            Final result                 Please view results for these tests on the individual orders.       EKG:   ECG 12 Lead Altered Mental Status   Final Result   HEART RATE=97  bpm   RR Fqukeusx=538  ms   NJ Yblihnxo=260  ms   P  Horizontal Axis=3  deg   P Front Axis=91  deg   QRSD Interval=83  ms   QT Fyylawkt=103  ms   ETvK=224  ms   QRS Axis=Invalid  deg   T Wave Axis=45  deg   - OTHERWISE NORMAL ECG -   Sinus rhythm   Low voltage, precordial leads   Abnormal R-wave progression, early transition   No change from prior tracing   Electronically Signed By: Efraín Llamas (INOCENCIA) 2025-02-16 14:43:49   Date and Time of Study:2025-02-16 12:03:49          Meds given in ED:   Medications   oseltamivir (TAMIFLU) capsule 75 mg (75 mg Oral Not Given 2/16/25 1422)   sodium chloride 0.9 % bolus 500 mL (0 mL Intravenous Stopped 2/16/25 1431)       Imaging results:  CT Head Without Contrast    Result Date: 2/16/2025   No CT evidence for acute intracranial pathology.  The etiology of the patient's altered mental status is not further elucidated on this examination. If further radiographic assessment is warranted, one could obtain an MRI of the brain for follow-up.   Radiation dose reduction techniques were utilized, including automated exposure control and exposure modulation based on body size.  This report was finalized on 2/16/2025 1:10 PM by Dr. Gurinder Higgins M.D on Workstation: [id number]      XR Chest 1 View    Result Date: 2/16/2025   1. Low lung volumes with suspected subsegmental atelectasis at the bases. 2. Linear right upper lobe opacity, suspect subsegmental atelectasis or linear scarring. 3. Small hiatal hernia  This report was finalized on 2/16/2025 12:34 PM by Dr. Rowdy Rollins M.D on Workstation: [id number]       Ambulatory status:   - nonambulatory    Social issues:   Social History     Socioeconomic History    Marital status: Single   Tobacco Use    Smoking status: Never    Smokeless tobacco: Never   Vaping Use    Vaping status: Never Used   Substance and Sexual Activity    Alcohol use: No     Comment: red wine occassional    Drug use: No    Sexual activity: Defer       Peripheral Neurovascular  Peripheral Neurovascular  (Adult)  Peripheral Neurovascular WDL: .WDL except  Additional Documentation: Edema (Group)  Edema  Edema: ankle, left, ankle, right, foot, right, foot, left  Ankle, Left Edema: 2+ (Mild)  Ankle, Right Edema: 2+ (Mild)  Foot, Left Edema: 2+ (Mild)  Foot, Right Edema: 2+ (Mild)    Neuro Cognitive  Neuro Cognitive (Adult)  Cognitive/Neuro/Behavioral WDL: arousability, .WDL except  Arousal Level: arouses to touch/gentle shaking, arouses to voice  Imelda Coma Scale  Best Eye Response: 3-->(E3) to speech  Best Motor Response: 6-->(M6) obeys commands  Best Verbal Response: 5-->(V5) oriented  Imelda Coma Scale Score: 14    Learning  Learning Assessment  Learning Readiness and Ability: other (see comments) (patient drowsy, wakes up when spoken to, then falls back to sleep)  Education Provided  Person Taught: patient  Teaching Method: verbal instruction  Teaching Focus: symptom/problem overview  Education Outcome Evaluation: no evidence of learning    Respiratory  Respiratory WDL  Respiratory WDL: .WDL except, cough  Cough Frequency: infrequent  Cough Type: nonproductive    Abdominal Pain       Pain Assessments  Pain (Adult)  (0-10) Pain Rating: Rest: 0    NIH Stroke Scale       Nick Lawson RN  02/16/25 15:20 EST

## 2025-02-16 NOTE — ED NOTES
Patient to ER via ems from nursing facility for fatigue     Patient denies any complaints   Patient wears 2L at all times

## 2025-02-16 NOTE — PROGRESS NOTES
[facility] Clinical Pharmacy Services: Dose Adjustment    Tamiflu has been appropriately renally dosed based on our System P&T approved policy. Pharmacy will continue to monitor patient peripherally while in-house.     Myah Peace Pharm.D., [facility]   Clinical Pharmacist

## 2025-02-16 NOTE — H&P
Patient Name:  Tony Leonard  YOB: 1940  MRN:  [id number]  Admit Date:  2/16/2025  Patient Care Team:  Alfonso Goldstein MD as PCP - General (Family Medicine)  Katharina Salter MD as Consulting Physician (Pulmonary Disease)  Anum Bhatt MD as Consulting Physician (Pain Medicine)      Subjective   History Present Illness     Chief Complaint   Patient presents with    Fatigue    Weakness - Generalized     Patient is 84-year-old male with history of COPD, GERD, neuropathy who is a nursing home resident was sent to the hospital due to worsening confusion, decreased p.o. intake and dyspnea.  He is on 2 L of oxygen that he normally uses at home and currently requiring 2 L.  Underwent routine workup including chest x-ray which revealed right upper lobe opacity and lactate was 1.9.  Urinalysis with no evidence of UTI.  Tested positive for flu.  No reports of any fevers or chills.  At the time of my evaluation patient is oriented to person and place and unable to provide a good history.  Most of the information was obtained from the ER provider and chart review.  He denies any fevers, chills, runny nose, sore throat.  Symbicort,    Review of Systems   A 12 system review has been performed and they are negative other than mentioned in the H&P      Personal History     Past Medical History:   Diagnosis Date    ADHD (attention deficit hyperactivity disorder)     Bronchiectasis     CHF (congestive heart failure)     Colon polyp     COPD (chronic obstructive pulmonary disease)     Diverticulosis     Hard of hearing     On home oxygen therapy     2 LITERS    Pneumonia     Prostate cancer 04/22/2019    Spinal stenosis, lumbar region with neurogenic claudication 08/02/2022     Past Surgical History:   Procedure Laterality Date    BRONCHOSCOPY N/A 04/30/2016    Procedure: BRONCHOSCOPY with BAL of right lower lobe and left  lower lobe.  ;  Surgeon: Nando Diaz MD;  Location: [facility] ENDOSCOPY;   Service:     BRONCHOSCOPY N/A 04/28/2017    Procedure: BRONCHOSCOPY;  Surgeon: Katharina Salter MD;  Location: [facility] ENDOSCOPY;  Service:     BRONCHOSCOPY Bilateral 06/29/2017    Procedure: BRONCHOSCOPY WITH WASHINGS;  Surgeon: Katharina Salter MD;  Location: [facility] ENDOSCOPY;  Service:     BRONCHOSCOPY N/A 01/22/2018    Procedure: BRONCHOSCOPY AT BEDSIDE with BAL;  Surgeon: Katharina Salter MD;  Location: [facility] ENDOSCOPY;  Service:     BRONCHOSCOPY N/A 01/30/2018    Procedure: BRONCHOSCOPY with BAL and washing;  Surgeon: Shreyas Wild MD;  Location: [facility] ENDOSCOPY;  Service:     BRONCHOSCOPY Bilateral 04/24/2018    Procedure: BRONCHOSCOPY WITH WASHING;  Surgeon: Katharina Salter MD;  Location: [facility] ENDOSCOPY;  Service: Pulmonary    BRONCHOSCOPY N/A 12/28/2019    Procedure: BRONCHOSCOPY with bilateral washing;  Surgeon: Katharina Salter MD;  Location: [facility] ENDOSCOPY;  Service: Pulmonary    BRONCHOSCOPY Bilateral 01/04/2023    Procedure: BRONCHOSCOPY with lavage;  Surgeon: Katharina Salter MD;  Location: [facility] ENDOSCOPY;  Service: Pulmonary;  Laterality: Bilateral;  mucus plugging    CARDIAC CATHETERIZATION N/A 01/29/2023    Procedure: Left Heart Cath;  Surgeon: Johnnie Ang MD;  Location: [facility] CATH INVASIVE LOCATION;  Service: Cardiology;  Laterality: N/A;    CARDIAC CATHETERIZATION N/A 01/29/2023    Procedure: Coronary angiography;  Surgeon: Johnnie Ang MD;  Location: [facility] CATH INVASIVE LOCATION;  Service: Cardiology;  Laterality: N/A;    COLONOSCOPY  05/17/2013    eh, ih, tort, sig tics    COLONOSCOPY N/A 05/10/2019    Non-thrombosed external hemorrhoids found on perianal exam, Diverticulosis, Tortuous colon, One 5 mm polyp in the mid ascending colon, IH. Path: Tubular adenoma.     COLONOSCOPY N/A 09/24/2022    Procedure: COLONOSCOPY to cecum and TI with argon plasma coagulation.;  Surgeon: Bruce Black MD;  Location: [facility] ENDOSCOPY;  Service: Gastroenterology;  Laterality: N/A;  pre-  GI bleeding  post- radiation proctitis, diverticulosis    ENDOSCOPY  03/19/2015    z line irreg, hh    ENDOSCOPY N/A 09/24/2022    Procedure: ESOPHAGOGASTRODUODENOSCOPY;  Surgeon: Bruce Black MD;  Location: [facility] ENDOSCOPY;  Service: Gastroenterology;  Laterality: N/A;  pre- GI bleeding  post- esophagitis, hiatal hernia    HIP OPEN REDUCTION Right 01/17/2018    Procedure: HIP OPEN REDUCTION INTERNAL FIXATION WITH DYNAMIC HIP SCREW;  Surgeon: Les Black MD;  Location: [facility] MAIN OR;  Service:     SPINE SURGERY      TONSILLECTOMY      TOTAL HIP ARTHROPLASTY REVISION Right 09/23/2019    Procedure: HIP  REVISION RIGHT;  Surgeon: Isauro Warner MD;  Location: [facility] MAIN OR;  Service: Orthopedics     Family History   Problem Relation Age of Onset    Thyroid disease Mother     No Known Problems Father     Malig Hyperthermia Neg Hx      Social History     Tobacco Use    Smoking status: Never    Smokeless tobacco: Never   Vaping Use    Vaping status: Never Used   Substance Use Topics    Alcohol use: No     Comment: red wine occassional    Drug use: No     No current facility-administered medications on file prior to encounter.     Current Outpatient Medications on File Prior to Encounter   Medication Sig Dispense Refill    acetaminophen (TYLENOL) 325 MG tablet Take 2 tablets by mouth Every 4 (Four) Hours As Needed for Mild Pain.      albuterol (PROVENTIL) (2.5 MG/3ML) 0.083% nebulizer solution Take 2.5 mg by nebulization Every 6 (Six) Hours As Needed for Wheezing. 360 mL 3    calcium carbonate (TUMS) 500 MG chewable tablet Chew 2 tablets Every 4 (Four) Hours As Needed for Indigestion or Heartburn.      dicyclomine (BENTYL) 10 MG capsule Take 1 capsule by mouth 3 (Three) Times a Day As Needed (abdominal bloating). 30 capsule 0    dimethicone (AVEENO) 1.3 % lotion lotion Apply 1 Application topically to the appropriate area as directed Every 12 (Twelve) Hours As Needed.      ferrous sulfate 325 (65 FE) MG  tablet Take 1 tablet by mouth Daily With Breakfast.      FLUoxetine (PROzac) 20 MG capsule TAKE 1 CAPSULE DAILY 90 capsule 3    Fluticasone-Umeclidin-Vilant (Trelegy Ellipta) 100-62.5-25 MCG/ACT inhaler Inhale 1 puff Daily.      furosemide (LASIX) 20 MG tablet Take 1 tablet by mouth 2 (Two) Times a Day.      gabapentin (NEURONTIN) 300 MG capsule Take 1 capsule by mouth 3 (Three) Times a Day. 6 capsule 0    guaiFENesin (MUCINEX) 600 MG 12 hr tablet Take 2 tablets by mouth 2 (Two) Times a Day.      Heat Wraps (ThermaCare Back Pain Therapy) misc Use 1 Pad Daily.      Melatonin 3 MG capsule Take 1 capsule by mouth As Needed.      menthol-zinc oxide (CALMOSEPTINE) 0.44-20.625 % ointment ointment Apply 1 Application topically to the appropriate area as directed Every 12 (Twelve) Hours.      multivitamin with minerals (Multivitamin Adult) tablet tablet Take 1 tablet by mouth Daily. 30 tablet 11    ondansetron ODT (ZOFRAN-ODT) 4 MG disintegrating tablet Place 1 tablet on the tongue Every 6 (Six) Hours As Needed for Nausea or Vomiting.      pantoprazole (PROTONIX) 40 MG EC tablet Take 1 tablet by mouth Daily. 90 tablet 3    potassium chloride (KLOR-CON) 20 MEQ packet Take 20 mEq by mouth Daily.      predniSONE (DELTASONE) 20 MG tablet Take 1 tablet by mouth Daily.      sodium chloride 7 % nebulizer solution nebulizer solution Take 4 mL by nebulization Every 4 (Four) Hours As Needed (pt takes as needed at home).      sucralfate (Carafate) 1 g tablet Take 1 tablet by mouth 4 (Four) Times a Day. 120 tablet 0    tiZANidine (ZANAFLEX) 2 MG tablet Take 1 tablet by mouth Daily As Needed for Muscle Spasms.      tuberculin (Aplisol) 5 UNIT/0.1ML injection Inject 0.1 mL into the appropriate area of the skin as directed by provider 1 (One) Time.      azithromycin (ZITHROMAX) 250 MG tablet Indications: Worsening of Chronic Obstructive Lung Disease 4 tablet 0     Allergies   Allergen Reactions    Daliresp [Roflumilast] Anaphylaxis     Latex Rash    Sulfa Antibiotics Rash       Objective    Objective     Vital Signs  Temp:  [98.5 °F (36.9 °C)-98.9 °F (37.2 °C)] 98.5 °F (36.9 °C)  Heart Rate:  [79-96] 87  Resp:  [16-18] 16  BP: (101-131)/(50-92) 131/82  SpO2:  [92 %-100 %] 100 %  on  Flow (L/min) (Oxygen Therapy):  [2] 2;   Device (Oxygen Therapy): nasal cannula  Body mass index is 19.89 kg/m².    Physical Exam  HEENT: PERRLA, extraocular movements intact, Scleras no icterus  Neck: Supple, no JVD  Cardiovascular: Regular rate and rhythm with normal S1 and S2  Respiratory: Diminished breath sounds with bilateral wheezes and rhonchi  GI: Soft, nontender, bowel sounds present  Extremities: Trace lower extremity edema, palpable pedal pulses  Neurologic: Grossly nonfocal, no facial asymmetry, globally weak, oriented x 2    Results Review:  I reviewed the patient's new clinical results.  I reviewed the patient's new imaging results and agree with the interpretation.  I reviewed the patient's other test results and agree with the interpretation  I personally viewed and interpreted the patient's EKG/Telemetry data  Discussed with ED provider.    Lab Results (last 24 hours)       Procedure Component Value Units Date/Time    Urinalysis With Culture If Indicated - Urine, Catheter [[id number]]  (Abnormal) Collected: 02/16/25 1157    Specimen: Urine, Catheter Updated: 02/16/25 1213     Color, UA Yellow     Appearance, UA Clear     pH, UA 6.0     Specific Gravity, UA 1.013     Glucose, UA Negative     Ketones, UA Trace     Bilirubin, UA Negative     Blood, UA Small (1+)     Protein, UA Negative     Leuk Esterase, UA Negative     Nitrite, UA Negative     Urobilinogen, UA 0.2 E.U./dL    Narrative:      In absence of clinical symptoms, the presence of pyuria, bacteria, and/or nitrites on the urinalysis result does not correlate with infection.    Urinalysis, Microscopic Only - Urine, Catheter [[id number]] Collected: 02/16/25 1157    Specimen: Urine, Catheter Updated:  02/16/25 1214     RBC, UA 0-2 /HPF      WBC, UA 0-2 /HPF      Comment: Urine culture not indicated.        Bacteria, UA None Seen /HPF      Squamous Epithelial Cells, UA 0-2 /HPF      Hyaline Casts, UA 0-2 /LPF      Methodology Automated Microscopy    CBC & Differential [[id number]]  (Abnormal) Collected: 02/16/25 1158    Specimen: Blood Updated: 02/16/25 1214    Narrative:      The following orders were created for panel order CBC & Differential.  Procedure                               Abnormality         Status                     ---------                               -----------         ------                     CBC Auto Differential[[id number]]        Abnormal            Final result                 Please view results for these tests on the individual orders.    Comprehensive Metabolic Panel [[id number]]  (Abnormal) Collected: 02/16/25 1158    Specimen: Blood Updated: 02/16/25 1243     Glucose 90 mg/dL      BUN 19 mg/dL      Creatinine 1.04 mg/dL      Sodium 139 mmol/L      Potassium 3.4 mmol/L      Comment: Slight hemolysis detected by analyzer. Result may be falsely elevated.        Chloride 102 mmol/L      CO2 25.9 mmol/L      Calcium 8.4 mg/dL      Total Protein 6.1 g/dL      Albumin 3.6 g/dL      ALT (SGPT) 21 U/L      AST (SGOT) 26 U/L      Alkaline Phosphatase 55 U/L      Total Bilirubin 0.5 mg/dL      Globulin 2.5 gm/dL      A/G Ratio 1.4 g/dL      BUN/Creatinine Ratio 18.3     Anion Gap 11.1 mmol/L      eGFR 69.9 mL/min/1.73     Narrative:      GFR Categories in Chronic Kidney Disease (CKD)      GFR Category          GFR (mL/min/1.73)    Interpretation  G1                     90 or greater         Normal or high (1)  G2                      60-89                Mild decrease (1)  G3a                   45-59                Mild to moderate decrease  G3b                   30-44                Moderate to severe decrease  G4                    15-29                Severe decrease  G5                     "14 or less           Kidney failure          (1)In the absence of evidence of kidney disease, neither GFR category G1 or G2 fulfill the criteria for CKD.    eGFR calculation 2021 CKD-EPI creatinine equation, which does not include race as a factor    CBC Auto Differential [[id number]]  (Abnormal) Collected: 02/16/25 1158    Specimen: Blood Updated: 02/16/25 1214     WBC 8.37 10*3/mm3      RBC 4.37 10*6/mm3      Hemoglobin 13.3 g/dL      Hematocrit 39.5 %      MCV 90.4 fL      MCH 30.4 pg      MCHC 33.7 g/dL      RDW 19.9 %      RDW-SD 64.4 fl      MPV 9.0 fL      Platelets 188 10*3/mm3      Neutrophil % 71.2 %      Lymphocyte % 9.6 %      Monocyte % 18.5 %      Eosinophil % 0.0 %      Basophil % 0.1 %      Immature Grans % 0.6 %      Neutrophils, Absolute 5.96 10*3/mm3      Lymphocytes, Absolute 0.80 10*3/mm3      Monocytes, Absolute 1.55 10*3/mm3      Eosinophils, Absolute 0.00 10*3/mm3      Basophils, Absolute 0.01 10*3/mm3      Immature Grans, Absolute 0.05 10*3/mm3      nRBC 0.0 /100 WBC     Lactic Acid, Plasma [[id number]]  (Normal) Collected: 02/16/25 1158    Specimen: Blood Updated: 02/16/25 1234     Lactate 1.9 mmol/L     Procalcitonin [[id number]]  (Normal) Collected: 02/16/25 1158    Specimen: Blood Updated: 02/16/25 1246     Procalcitonin 0.11 ng/mL     Narrative:      As a Marker for Sepsis (Non-Neonates):    1. <0.5 ng/mL represents a low risk of severe sepsis and/or septic shock.  2. >2 ng/mL represents a high risk of severe sepsis and/or septic shock.    As a Marker for Lower Respiratory Tract Infections that require antibiotic therapy:    PCT on Admission    Antibiotic Therapy       6-12 Hrs later    >0.5                Strongly Recommended  >0.25 - <0.5        Recommended   0.1 - 0.25          Discouraged              Remeasure/reassess PCT  <0.1                Strongly Discouraged     Remeasure/reassess PCT    As 28 day mortality risk marker: \"Change in Procalcitonin Result\" (>80% or <=80%) if Day 0 " (or Day 1) and Day 4 values are available. Refer to http://www.[facility]-pct-calculator.com    Change in PCT <=80%  A decrease of PCT levels below or equal to 80% defines a positive change in PCT test result representing a higher risk for 28-day all-cause mortality of patients diagnosed with severe sepsis for septic shock.    Change in PCT >80%  A decrease of PCT levels of more than 80% defines a negative change in PCT result representing a lower risk for 28-day all-cause mortality of patients diagnosed with severe sepsis or septic shock.       High Sensitivity Troponin T [[id number]]  (Abnormal) Collected: 02/16/25 1158    Specimen: Blood Updated: 02/16/25 1246     HS Troponin T 46 ng/L     Narrative:      High Sensitive Troponin T Reference Range:  <14.0 ng/L- Negative Female for AMI  <22.0 ng/L- Negative Male for AMI  >=14 - Abnormal Female indicating possible myocardial injury.  >=22 - Abnormal Male indicating possible myocardial injury.   Clinicians would have to utilize clinical acumen, EKG, Troponin, and serial changes to determine if it is an Acute Myocardial Infarction or myocardial injury due to an underlying chronic condition.         BNP [[id number]]  (Normal) Collected: 02/16/25 1158    Specimen: Blood Updated: 02/16/25 1246     proBNP 1,099.0 pg/mL     Narrative:      This assay is used as an aid in the diagnosis of individuals suspected of having heart failure. It can be used as an aid in the diagnosis of acute decompensated heart failure (ADHF) in patients presenting with signs and symptoms of ADHF to the emergency department (ED). In addition, NT-proBNP of <300 pg/mL indicates ADHF is not likely.    Age Range Result Interpretation  NT-proBNP Concentration (pg/mL:      <50             Positive            >450                   Gray                 300-450                    Negative             <300    50-75           Positive            >900                  Gray                300-900                   Negative            <300      >75             Positive            >1800                  Gray                300-1800                  Negative            <300    Respiratory Panel PCR w/COVID-19(SARS-CoV-2) JARRETT/AMPARO/ROYAL/PAD/COR/JERMAIN In-House, NP Swab in UTM/VTM, 2 HR TAT - Swab, Nasopharynx [[id number]]  (Abnormal) Collected: 02/16/25 1201    Specimen: Swab from Nasopharynx Updated: 02/16/25 1301     ADENOVIRUS, PCR Not Detected     Coronavirus 229E Not Detected     Coronavirus HKU1 Not Detected     Coronavirus NL63 Not Detected     Coronavirus OC43 Not Detected     COVID19 Not Detected     Human Metapneumovirus Not Detected     Human Rhinovirus/Enterovirus Not Detected     Influenza A H1 2009 PCR Detected     Influenza B PCR Not Detected     Parainfluenza Virus 1 Not Detected     Parainfluenza Virus 2 Not Detected     Parainfluenza Virus 3 Not Detected     Parainfluenza Virus 4 Not Detected     RSV, PCR Not Detected     Bordetella pertussis pcr Not Detected     Bordetella parapertussis PCR Not Detected     Chlamydophila pneumoniae PCR Not Detected     Mycoplasma pneumo by PCR Not Detected    Narrative:      In the setting of a positive respiratory panel with a viral infection PLUS a negative procalcitonin without other underlying concern for bacterial infection, consider observing off antibiotics or discontinuation of antibiotics and continue supportive care. If the respiratory panel is positive for atypical bacterial infection (Bordetella pertussis, Chlamydophila pneumoniae, or Mycoplasma pneumoniae), consider antibiotic de-escalation to target atypical bacterial infection.    Blood Gas, Arterial - [[id number]]  (Abnormal) Collected: 02/16/25 1213    Specimen: Arterial Blood Updated: 02/16/25 1215     Site Left Radial     Oinel's Test Positive     pH, Arterial 7.418 pH units      pCO2, Arterial 43.2 mm Hg      pO2, Arterial 72.4 mm Hg      HCO3, Arterial 27.8 mmol/L      Base Excess, Arterial 2.9 mmol/L      Comment:  Serial Number: [id number]:  [id number]        O2 Saturation, Arterial 94.5 %      CO2 Content 29.2 mmol/L      Barometric Pressure for Blood Gas 745.7000 mmHg      Modality Cannula     Flow Rate 2.0000 lpm      Rate 18 Breaths/minute      Hemodilution No     Device Comment 96%    High Sensitivity Troponin T 1Hr [[id number]]  (Abnormal) Collected: 02/16/25 1330    Specimen: Blood from Arm, Right Updated: 02/16/25 1413     HS Troponin T 45 ng/L      Troponin T Numeric Delta -1 ng/L      Troponin T % Delta -2    Narrative:      High Sensitive Troponin T Reference Range:  <14.0 ng/L- Negative Female for AMI  <22.0 ng/L- Negative Male for AMI  >=14 - Abnormal Female indicating possible myocardial injury.  >=22 - Abnormal Male indicating possible myocardial injury.   Clinicians would have to utilize clinical acumen, EKG, Troponin, and serial changes to determine if it is an Acute Myocardial Infarction or myocardial injury due to an underlying chronic condition.                 Imaging Results (Last 24 Hours)       Procedure Component Value Units Date/Time    CT Head Without Contrast [[id number]] Collected: 02/16/25 1306     Updated: 02/16/25 1313    Narrative:      CT HEAD WITHOUT CONTRAST     CLINICAL HISTORY: altered mental status     TECHNIQUE: CT scan of the head was obtained with 3 mm axial soft tissue  algorithm images. No intravenous contrast was administered. Sagittal and  coronal reconstructions were obtained.     COMPARISON: CT head dated 6/4/2024     FINDINGS:       The ventricles, sulci, and cisterns are age-appropriate. The gray-white  matter differentiation is within normal limits. The basal ganglia and  thalami are unremarkable in appearance. The posterior fossa structures  are unremarkable.     Incidental postsurgical changes are noted within the paranasal sinuses.  Mild mucosal thickening is seen within the ethmoid air cells. There is a  mucous retention cyst within the right aspect of the sphenoid sinus.        Impression:         No CT evidence for acute intracranial pathology.  The etiology of the  patient's altered mental status is not further elucidated on this  examination. If further radiographic assessment is warranted, one could  obtain an MRI of the brain for follow-up.        Radiation dose reduction techniques were utilized, including automated  exposure control and exposure modulation based on body size.     This report was finalized on 2/16/2025 1:10 PM by Dr. Gurinder Higgins M.D  on Workstation: [id number]       XR Chest 1 View [[id number]] Collected: 02/16/25 1231     Updated: 02/16/25 1237    Narrative:      XR CHEST 1 VW-        INDICATION: Cough, altered mental status     COMPARISON: Chest radiograph November 25, 2024     TECHNIQUE: 1 view chest     FINDINGS:      Linear opacity in the right upper lung. Small bibasilar opacities. Low  lung volumes. Suspect epicardial fat pad at the left lung base. Stable  mediastinum. Small hiatal hernia. Anterior cervical discectomy and  fusion.       Impression:         1. Low lung volumes with suspected subsegmental atelectasis at the  bases.  2. Linear right upper lobe opacity, suspect subsegmental atelectasis or  linear scarring.  3. Small hiatal hernia     This report was finalized on 2/16/2025 12:34 PM by Dr. Rowdy Rollins M.D on Workstation: [id number]               Results for orders placed during the hospital encounter of 12/21/23    Adult Transthoracic Echo Complete W/ Cont if Necessary Per Protocol    Interpretation Summary    Left ventricular ejection fraction appears to be 56 - 60%.    Left ventricular diastolic function was normal.    Moderate dilation of the aortic root is present. Mild dilation of the sinuses of Valsalva is present.      ECG 12 Lead Altered Mental Status   Final Result   HEART RATE=97  bpm   RR Vvwvyjij=964  ms   ID Qmfupvoi=963  ms   P Horizontal Axis=3  deg   P Front Axis=91  deg   QRSD Interval=83  ms   QT Krnzrytp=576  ms    VOoM=246  ms   QRS Axis=Invalid  deg   T Wave Axis=45  deg   - OTHERWISE NORMAL ECG -   Sinus rhythm   Low voltage, precordial leads   Abnormal R-wave progression, early transition   No change from prior tracing   Electronically Signed By: Efraín Llamas ([facility]) 2025-02-16 14:43:49   Date and Time of Study:2025-02-16 12:03:49      Telemetry Scan   Final Result           Assessment/Plan     Active Hospital Problems    Diagnosis  POA    **Influenza A [J10.1]  Yes    Altered mental status [R41.82]  Unknown    COPD with acute exacerbation [J44.1]  Yes    Neuropathy [G62.9]  Yes    GERD (gastroesophageal reflux disease) [K21.9]  Yes      Resolved Hospital Problems   No resolved problems to display.     #1 COPD exacerbation/bronchiectasis, suspected right upper lobe pneumonia, patient will be on DuoNebs, Yupelri nebs, IV steroids, Mucinex and IV Rocephin.  Encouraged him to use incentive spirometry and continue with 2 L of supplemental oxygen.  CT chest will also be obtained.  2.  Influenza, on Tamiflu.  3.  Neuropathy, Neurontin.  4.  GERD, on PPI and sucralfate.  5.  Altered mental status, likely metabolic etiology and CT brain with no acute findings.  ABG will be obtained for sake of completion of workup.  6.  On Lovenox for DVT prophylaxis.  7.  CODE STATUS is full code.      Cedrick Chau MD  [facility]  02/16/25  18:57 EST

## 2025-02-16 NOTE — ED PROVIDER NOTES
EMERGENCY DEPARTMENT ENCOUNTER  Room Number:  39/39  PCP: Alfonso Goldstein MD  Independent Historians: Patient and EMS      HPI:  Chief Complaint: had concerns including Fatigue and Weakness - Generalized.     A complete HPI/ROS/PMH/PSH/SH/FH are unobtainable due to: Altered Mental Status    Chronic or social conditions impacting patient care (Social Determinants of Health): None      Context: Tony Leonard is a 84 y.o. male with a medical history of COPD, dyslipidemia, GERD, hemorrhoids, hiatal hernia who presents to the ED c/o acute altered mental status.  EMS reports they picked up the patient from the nursing facility for generalized fatigue and altered mental status.  The patient is not able to provide any history.  He is ill-appearing.  They report he is chronically on 2 L of oxygen.  His paperwork shows that he is a full code.      Review of prior external notes (non-ED) -and- Review of prior external test results outside of this encounter:  Laboratory evaluation 11/27/2024 shows a BMP with a low potassium of 3.0    Prescription drug monitoring program review:         PAST MEDICAL HISTORY  Active Ambulatory Problems     Diagnosis Date Noted    Thrush, oral 03/11/2016    ADD (attention deficit disorder) 03/11/2016    Hemorrhoids 03/11/2016    Bronchiectasis with acute lower respiratory infection 03/11/2016    Diverticul disease small and large intestine, no perforati or abscess 03/11/2016    Benign non-nodular prostatic hyperplasia without lower urinary tract symptoms 03/11/2016    Gastroesophageal reflux disease 03/11/2016    Malaise and fatigue 03/11/2016    Environmental allergies 04/25/2016    Hemoptysis 04/27/2016    Dyslipidemia 05/11/2016    Primary osteoarthritis involving multiple joints 05/11/2016    Pneumonia of right lower lobe due to infectious organism 10/03/2016    Abnormal EKG 10/04/2016    COPD (chronic obstructive pulmonary disease) 10/04/2016    Mild malnutrition 03/28/2017     Acute on chronic respiratory failure with hypoxia 04/27/2017    Fracture, intertrochanteric, right femur, closed, initial encounter 01/16/2018    Chronic diastolic CHF (congestive heart failure) 01/16/2018    Hematoma of frontal scalp 01/16/2018    Postoperative anemia due to acute blood loss 01/19/2018    Urinary retention 01/25/2018    Hemorrhagic disorder due to circulating anticoagulants 01/29/2018    History of fracture of right hip 02/22/2018    Closed fracture of right hip with routine healing 02/28/2018    Chronic low back pain with sciatica 06/21/2018    Change in bowel function 04/18/2019    Rectal bleeding 04/18/2019    Prostate cancer 04/22/2019    On home oxygen therapy 09/24/2019    Acute viral bronchitis 12/29/2019    Peripheral neuropathy 07/01/2021    Plantar fasciitis 09/08/2021    HTN (hypertension) 05/06/2022    Bilateral lower extremity edema 05/07/2022    Microscopic hematuria 05/08/2022    Acute midline low back pain with right-sided sciatica 08/01/2022    Spinal stenosis, lumbar region with neurogenic claudication 08/02/2022    Compression deformity of vertebra 08/02/2022    Rectal bleed 09/23/2022    Esophagitis 09/24/2022    Angiectasia 09/27/2022    Hiatal hernia 09/27/2022    Overweight (BMI 25.0-29.9) 11/14/2022    Leukocytosis 11/15/2022    Bilateral hip pain 11/21/2022    Elevated troponin level not due myocardial infarction 12/10/2022    Nocturnal hypoxia 12/10/2022    Pneumonia of right upper lobe due to infectious organism 12/29/2022    NSTEMI (non-ST elevated myocardial infarction) 01/29/2023    Chronic respiratory failure with hypoxia 01/29/2023    Paroxysmal atrial fibrillation 02/17/2023    Acute exacerbation of chronic obstructive pulmonary disease (COPD) 05/10/2023    Anemia 05/10/2023    Non-compliant patient 05/11/2023    Hypokalemia 10/01/2020    Spondylolisthesis of lumbar region 08/14/2018    Elevated troponin 12/22/2023    Rhabdomyolysis 12/26/2023    Multiple contusions  12/26/2023    Fall 12/26/2023    Dizziness 04/09/2024    Contusion of hip 04/09/2024    Pulmonary embolism 05/21/2024     Resolved Ambulatory Problems     Diagnosis Date Noted    Pneumonia of both lower lobes due to infectious organism 03/11/2016    Pneumonia of right upper lobe due to infectious organism 04/22/2016    COPD exacerbation 04/25/2016    GERD (gastroesophageal reflux disease) 04/25/2016    Chronic respiratory failure with hypoxia 10/04/2016    Bacterial lobar pneumonia 12/27/2016    Pneumonia of left lower lobe due to infectious organism 03/26/2017    Bronchitis 06/01/2017    COPD exacerbation 06/17/2017    Leukocytosis 01/16/2018    Right upper lobe pneumonia 01/20/2018    Mucus plugging of bronchi 01/16/2018    COPD exacerbation 04/16/2018    Degenerative joint disease (DJD) of hip 09/23/2019    Bronchiectasis with (acute) exacerbation 12/28/2019    COPD exacerbation 05/07/2022    Septic shock 11/26/2022    Acute saddle pulmonary embolism without acute cor pulmonale  - 12/11/2022 12/11/2022    Chest pain 04/19/2024    COPD with acute exacerbation 11/25/2024     Past Medical History:   Diagnosis Date    ADHD (attention deficit hyperactivity disorder)     Bronchiectasis     CHF (congestive heart failure)     Colon polyp     Diverticulosis     Hard of hearing     Pneumonia          PAST SURGICAL HISTORY  Past Surgical History:   Procedure Laterality Date    BRONCHOSCOPY N/A 04/30/2016    Procedure: BRONCHOSCOPY with BAL of right lower lobe and left  lower lobe.  ;  Surgeon: Nando Diaz MD;  Location: [facility] ENDOSCOPY;  Service:     BRONCHOSCOPY N/A 04/28/2017    Procedure: BRONCHOSCOPY;  Surgeon: Katharina Salter MD;  Location: [facility] ENDOSCOPY;  Service:     BRONCHOSCOPY Bilateral 06/29/2017    Procedure: BRONCHOSCOPY WITH WASHINGS;  Surgeon: Katharina Salter MD;  Location: [facility] ENDOSCOPY;  Service:     BRONCHOSCOPY N/A 01/22/2018    Procedure: BRONCHOSCOPY AT BEDSIDE with BAL;  Surgeon: Katharina GUILLAUME  MD Gaetano;  Location: [facility] ENDOSCOPY;  Service:     BRONCHOSCOPY N/A 01/30/2018    Procedure: BRONCHOSCOPY with BAL and washing;  Surgeon: Shreyas Wild MD;  Location: [facility] ENDOSCOPY;  Service:     BRONCHOSCOPY Bilateral 04/24/2018    Procedure: BRONCHOSCOPY WITH WASHING;  Surgeon: Katharina Salter MD;  Location: [facility] ENDOSCOPY;  Service: Pulmonary    BRONCHOSCOPY N/A 12/28/2019    Procedure: BRONCHOSCOPY with bilateral washing;  Surgeon: Katharina Salter MD;  Location: [facility] ENDOSCOPY;  Service: Pulmonary    BRONCHOSCOPY Bilateral 01/04/2023    Procedure: BRONCHOSCOPY with lavage;  Surgeon: Katharina Salter MD;  Location: [facility] ENDOSCOPY;  Service: Pulmonary;  Laterality: Bilateral;  mucus plugging    CARDIAC CATHETERIZATION N/A 01/29/2023    Procedure: Left Heart Cath;  Surgeon: Johnnie Ang MD;  Location: [facility] CATH INVASIVE LOCATION;  Service: Cardiology;  Laterality: N/A;    CARDIAC CATHETERIZATION N/A 01/29/2023    Procedure: Coronary angiography;  Surgeon: Johnnie Ang MD;  Location: [facility] CATH INVASIVE LOCATION;  Service: Cardiology;  Laterality: N/A;    COLONOSCOPY  05/17/2013    eh, ih, tort, sig tics    COLONOSCOPY N/A 05/10/2019    Non-thrombosed external hemorrhoids found on perianal exam, Diverticulosis, Tortuous colon, One 5 mm polyp in the mid ascending colon, IH. Path: Tubular adenoma.     COLONOSCOPY N/A 09/24/2022    Procedure: COLONOSCOPY to cecum and TI with argon plasma coagulation.;  Surgeon: Bruce Black MD;  Location: [facility] ENDOSCOPY;  Service: Gastroenterology;  Laterality: N/A;  pre- GI bleeding  post- radiation proctitis, diverticulosis    ENDOSCOPY  03/19/2015    z line irreg, hh    ENDOSCOPY N/A 09/24/2022    Procedure: ESOPHAGOGASTRODUODENOSCOPY;  Surgeon: Bruce Black MD;  Location: [facility] ENDOSCOPY;  Service: Gastroenterology;  Laterality: N/A;  pre- GI bleeding  post- esophagitis, hiatal hernia    HIP OPEN REDUCTION Right 01/17/2018    Procedure: HIP  OPEN REDUCTION INTERNAL FIXATION WITH DYNAMIC HIP SCREW;  Surgeon: Les Black MD;  Location: [facility];  Service:     SPINE SURGERY      TONSILLECTOMY      TOTAL HIP ARTHROPLASTY REVISION Right 09/23/2019    Procedure: HIP  REVISION RIGHT;  Surgeon: Isauro Warner MD;  Location: [facility];  Service: Orthopedics         FAMILY HISTORY  Family History   Problem Relation Age of Onset    Thyroid disease Mother     No Known Problems Father     Malig Hyperthermia Neg Hx          SOCIAL HISTORY  Social History     Socioeconomic History    Marital status: Single   Tobacco Use    Smoking status: Never    Smokeless tobacco: Never   Vaping Use    Vaping status: Never Used   Substance and Sexual Activity    Alcohol use: No     Comment: red wine occassional    Drug use: No    Sexual activity: Defer         ALLERGIES  Daliresp [roflumilast], Latex, and Sulfa antibiotics      REVIEW OF SYSTEMS  Review of Systems  Included in HPI  All systems reviewed and negative except for those discussed in HPI.      PHYSICAL EXAM    I have reviewed the triage vital signs and nursing notes.    ED Triage Vitals   Temp Heart Rate Resp BP SpO2   02/16/25 1112 02/16/25 1113 02/16/25 1113 02/16/25 1113 02/16/25 1113   98.9 °F (37.2 °C) 93 16 112/62 93 %      Temp src Heart Rate Source Patient Position BP Location FiO2 (%)   -- 02/16/25 1141 02/16/25 1141 02/16/25 1141 --    Monitor Lying Right arm        Physical Exam  GENERAL: Ill-appearing.  Awakens to voice and tactile stimulation.  Gurgling breath sounds.  When he does awaken he is oriented x 3.  SKIN: Warm, dry  HENT: Normocephalic, atraumatic, dry tongue, gurgling respirations from the upper airway  EYES: no scleral icterus  CV: regular rhythm, regular rate  RESPIRATORY: normal effort, lungs with rhonchi bilaterally  ABDOMEN: soft, nontender, nondistended  MUSCULOSKELETAL: no deformity  NEURO:  moves all extremities, follows commands            LAB RESULTS  Recent Results  (from the past 24 hours)   Urinalysis With Culture If Indicated - Urine, Catheter    Collection Time: 02/16/25 11:57 AM    Specimen: Urine, Catheter   Result Value Ref Range    Color, UA Yellow Yellow, Straw    Appearance, UA Clear Clear    pH, UA 6.0 5.0 - 8.0    Specific Gravity, UA 1.013 1.005 - 1.030    Glucose, UA Negative Negative    Ketones, UA Trace (A) Negative    Bilirubin, UA Negative Negative    Blood, UA Small (1+) (A) Negative    Protein, UA Negative Negative    Leuk Esterase, UA Negative Negative    Nitrite, UA Negative Negative    Urobilinogen, UA 0.2 E.U./dL 0.2 - 1.0 E.U./dL   Urinalysis, Microscopic Only - Urine, Catheter    Collection Time: 02/16/25 11:57 AM    Specimen: Urine, Catheter   Result Value Ref Range    RBC, UA 0-2 None Seen, 0-2 /HPF    WBC, UA 0-2 None Seen, 0-2 /HPF    Bacteria, UA None Seen None Seen /HPF    Squamous Epithelial Cells, UA 0-2 None Seen, 0-2 /HPF    Hyaline Casts, UA 0-2 None Seen /LPF    Methodology Automated Microscopy    Comprehensive Metabolic Panel    Collection Time: 02/16/25 11:58 AM    Specimen: Blood   Result Value Ref Range    Glucose 90 65 - 99 mg/dL    BUN 19 8 - 23 mg/dL    Creatinine 1.04 0.76 - 1.27 mg/dL    Sodium 139 136 - 145 mmol/L    Potassium 3.4 (L) 3.5 - 5.2 mmol/L    Chloride 102 98 - 107 mmol/L    CO2 25.9 22.0 - 29.0 mmol/L    Calcium 8.4 (L) 8.6 - 10.5 mg/dL    Total Protein 6.1 6.0 - 8.5 g/dL    Albumin 3.6 3.5 - 5.2 g/dL    ALT (SGPT) 21 1 - 41 U/L    AST (SGOT) 26 1 - 40 U/L    Alkaline Phosphatase 55 39 - 117 U/L    Total Bilirubin 0.5 0.0 - 1.2 mg/dL    Globulin 2.5 gm/dL    A/G Ratio 1.4 g/dL    BUN/Creatinine Ratio 18.3 7.0 - 25.0    Anion Gap 11.1 5.0 - 15.0 mmol/L    eGFR 69.9 >60.0 mL/min/1.73   CBC Auto Differential    Collection Time: 02/16/25 11:58 AM    Specimen: Blood   Result Value Ref Range    WBC 8.37 3.40 - 10.80 10*3/mm3    RBC 4.37 4.14 - 5.80 10*6/mm3    Hemoglobin 13.3 13.0 - 17.7 g/dL    Hematocrit 39.5 37.5 - 51.0 %     MCV 90.4 79.0 - 97.0 fL    MCH 30.4 26.6 - 33.0 pg    MCHC 33.7 31.5 - 35.7 g/dL    RDW 19.9 (H) 12.3 - 15.4 %    RDW-SD 64.4 (H) 37.0 - 54.0 fl    MPV 9.0 6.0 - 12.0 fL    Platelets 188 140 - 450 10*3/mm3    Neutrophil % 71.2 42.7 - 76.0 %    Lymphocyte % 9.6 (L) 19.6 - 45.3 %    Monocyte % 18.5 (H) 5.0 - 12.0 %    Eosinophil % 0.0 (L) 0.3 - 6.2 %    Basophil % 0.1 0.0 - 1.5 %    Immature Grans % 0.6 (H) 0.0 - 0.5 %    Neutrophils, Absolute 5.96 1.70 - 7.00 10*3/mm3    Lymphocytes, Absolute 0.80 0.70 - 3.10 10*3/mm3    Monocytes, Absolute 1.55 (H) 0.10 - 0.90 10*3/mm3    Eosinophils, Absolute 0.00 0.00 - 0.40 10*3/mm3    Basophils, Absolute 0.01 0.00 - 0.20 10*3/mm3    Immature Grans, Absolute 0.05 0.00 - 0.05 10*3/mm3    nRBC 0.0 0.0 - 0.2 /100 WBC   Lactic Acid, Plasma    Collection Time: 02/16/25 11:58 AM    Specimen: Blood   Result Value Ref Range    Lactate 1.9 0.5 - 2.0 mmol/L   Procalcitonin    Collection Time: 02/16/25 11:58 AM    Specimen: Blood   Result Value Ref Range    Procalcitonin 0.11 0.00 - 0.25 ng/mL   High Sensitivity Troponin T    Collection Time: 02/16/25 11:58 AM    Specimen: Blood   Result Value Ref Range    HS Troponin T 46 (H) <22 ng/L   BNP    Collection Time: 02/16/25 11:58 AM    Specimen: Blood   Result Value Ref Range    proBNP 1,099.0 0.0 - 1,800.0 pg/mL   Respiratory Panel PCR w/COVID-19(SARS-CoV-2) [facility] In-House, NP Swab in UT/VTM, 2 HR TAT - Swab, Nasopharynx    Collection Time: 02/16/25 12:01 PM    Specimen: Nasopharynx; Swab   Result Value Ref Range    ADENOVIRUS, PCR Not Detected Not Detected    Coronavirus 229E Not Detected Not Detected    Coronavirus HKU1 Not Detected Not Detected    Coronavirus NL63 Not Detected Not Detected    Coronavirus OC43 Not Detected Not Detected    COVID19 Not Detected Not Detected - Ref. Range    Human Metapneumovirus Not Detected Not Detected    Human Rhinovirus/Enterovirus Not Detected Not Detected    Influenza A H1 2009 PCR  Detected (A) Not Detected    Influenza B PCR Not Detected Not Detected    Parainfluenza Virus 1 Not Detected Not Detected    Parainfluenza Virus 2 Not Detected Not Detected    Parainfluenza Virus 3 Not Detected Not Detected    Parainfluenza Virus 4 Not Detected Not Detected    RSV, PCR Not Detected Not Detected    Bordetella pertussis pcr Not Detected Not Detected    Bordetella parapertussis PCR Not Detected Not Detected    Chlamydophila pneumoniae PCR Not Detected Not Detected    Mycoplasma pneumo by PCR Not Detected Not Detected   ECG 12 Lead Altered Mental Status    Collection Time: 02/16/25 12:03 PM   Result Value Ref Range    QT Interval 342 ms    QTC Interval 434 ms   Blood Gas, Arterial -    Collection Time: 02/16/25 12:13 PM    Specimen: Arterial Blood   Result Value Ref Range    Site Left Radial     Oniel's Test Positive     pH, Arterial 7.418 7.350 - 7.450 pH units    pCO2, Arterial 43.2 35.0 - 45.0 mm Hg    pO2, Arterial 72.4 (L) 80.0 - 100.0 mm Hg    HCO3, Arterial 27.8 22.0 - 28.0 mmol/L    Base Excess, Arterial 2.9 (H) 0.0 - 2.0 mmol/L    O2 Saturation, Arterial 94.5 92.0 - 98.5 %    CO2 Content 29.2 (H) 23 - 27 mmol/L    Barometric Pressure for Blood Gas 745.7000 mmHg    Modality Cannula     Flow Rate 2.0000 lpm    Rate 18 Breaths/minute    Hemodilution No     Device Comment 96%          RADIOLOGY  CT Head Without Contrast    Result Date: 2/16/2025  CT HEAD WITHOUT CONTRAST  CLINICAL HISTORY: altered mental status  TECHNIQUE: CT scan of the head was obtained with 3 mm axial soft tissue algorithm images. No intravenous contrast was administered. Sagittal and coronal reconstructions were obtained.  COMPARISON: CT head dated 6/4/2024  FINDINGS:   The ventricles, sulci, and cisterns are age-appropriate. The gray-white matter differentiation is within normal limits. The basal ganglia and thalami are unremarkable in appearance. The posterior fossa structures are unremarkable.  Incidental postsurgical  changes are noted within the paranasal sinuses. Mild mucosal thickening is seen within the ethmoid air cells. There is a mucous retention cyst within the right aspect of the sphenoid sinus.       No CT evidence for acute intracranial pathology.  The etiology of the patient's altered mental status is not further elucidated on this examination. If further radiographic assessment is warranted, one could obtain an MRI of the brain for follow-up.   Radiation dose reduction techniques were utilized, including automated exposure control and exposure modulation based on body size.  This report was finalized on 2/16/2025 1:10 PM by Dr. Gurinder Higgins M.D on Workstation: [id number]      XR Chest 1 View    Result Date: 2/16/2025  XR CHEST 1 VW-   INDICATION: Cough, altered mental status  COMPARISON: Chest radiograph November 25, 2024  TECHNIQUE: 1 view chest  FINDINGS:  Linear opacity in the right upper lung. Small bibasilar opacities. Low lung volumes. Suspect epicardial fat pad at the left lung base. Stable mediastinum. Small hiatal hernia. Anterior cervical discectomy and fusion.       1. Low lung volumes with suspected subsegmental atelectasis at the bases. 2. Linear right upper lobe opacity, suspect subsegmental atelectasis or linear scarring. 3. Small hiatal hernia  This report was finalized on 2/16/2025 12:34 PM by Dr. Rowdy Rollins M.D on Workstation: [id number]         MEDICATIONS GIVEN IN ER  Medications   oseltamivir (TAMIFLU) capsule 75 mg (has no administration in time range)   sodium chloride 0.9 % bolus 500 mL (has no administration in time range)         ORDERS PLACED DURING THIS VISIT:  Orders Placed This Encounter   Procedures    Respiratory Panel PCR w/COVID-19(SARS-CoV-2) [facility] In-House, NP Swab in UTM/VTM, 2 HR TAT - Swab, Nasopharynx    XR Chest 1 View    CT Head Without Contrast    Comprehensive Metabolic Panel    CBC Auto Differential    Lactic Acid, Plasma    Procalcitonin     Urinalysis With Culture If Indicated - Urine, Catheter    High Sensitivity Troponin T    BNP    Blood Gas, Arterial -    Urinalysis, Microscopic Only - Urine, Clean Catch    High Sensitivity Troponin T 1Hr    [facility] (on-call MD unless specified) Details    Nasotracheal Suctioning (RT)    ECG 12 Lead Altered Mental Status    Initiate Observation Status    CBC & Differential         OUTPATIENT MEDICATION MANAGEMENT:  Current Facility-Administered Medications Ordered in Epic   Medication Dose Route Frequency Provider Last Rate Last Admin    oseltamivir (TAMIFLU) capsule 75 mg  75 mg Oral Once Malik Benedict MD        sodium chloride 0.9 % bolus 500 mL  500 mL Intravenous Once Malik Benedict MD         Current Outpatient Medications Ordered in Epic   Medication Sig Dispense Refill    albuterol (PROVENTIL) (2.5 MG/3ML) 0.083% nebulizer solution Take 2.5 mg by nebulization Every 6 (Six) Hours As Needed for Wheezing. 360 mL 3    calcium carbonate (TUMS) 500 MG chewable tablet Chew 2 tablets Every 4 (Four) Hours As Needed for Indigestion or Heartburn.      dicyclomine (BENTYL) 10 MG capsule Take 1 capsule by mouth 3 (Three) Times a Day As Needed (abdominal bloating). 30 capsule 0    dimethicone (AVEENO) 1.3 % lotion lotion Apply 1 Application topically to the appropriate area as directed Every 12 (Twelve) Hours As Needed.      ferrous sulfate 325 (65 FE) MG tablet Take 1 tablet by mouth Daily With Breakfast.      FLUoxetine (PROzac) 20 MG capsule TAKE 1 CAPSULE DAILY 90 capsule 3    furosemide (LASIX) 20 MG tablet Take 1 tablet by mouth 2 (Two) Times a Day.      gabapentin (NEURONTIN) 300 MG capsule Take 1 capsule by mouth 3 (Three) Times a Day. 6 capsule 0    guaiFENesin (MUCINEX) 600 MG 12 hr tablet Take 2 tablets by mouth 2 (Two) Times a Day.      Melatonin 3 MG capsule Take 1 capsule by mouth As Needed.      menthol-zinc oxide (CALMOSEPTINE) 0.44-20.625 % ointment ointment Apply 1 Application topically to the  appropriate area as directed Every 12 (Twelve) Hours.      multivitamin with minerals (Multivitamin Adult) tablet tablet Take 1 tablet by mouth Daily. 30 tablet 11    pantoprazole (PROTONIX) 40 MG EC tablet Take 1 tablet by mouth Daily. 90 tablet 3    potassium chloride (KLOR-CON) 20 MEQ packet Take 20 mEq by mouth Daily.      predniSONE (DELTASONE) 20 MG tablet Take 1 tablet by mouth Daily.      sucralfate (Carafate) 1 g tablet Take 1 tablet by mouth 4 (Four) Times a Day. 120 tablet 0    acetaminophen (TYLENOL) 325 MG tablet Take 2 tablets by mouth Every 4 (Four) Hours As Needed for Mild Pain.      azithromycin (ZITHROMAX) 250 MG tablet Indications: Worsening of Chronic Obstructive Lung Disease 4 tablet 0    Fluticasone-Umeclidin-Vilant (Trelegy Ellipta) 100-62.5-25 MCG/ACT inhaler Inhale 1 puff Daily.      Heat Wraps (ThermaCare Back Pain Therapy) misc Use 1 Pad Daily.      ondansetron ODT (ZOFRAN-ODT) 4 MG disintegrating tablet Place 1 tablet on the tongue Every 6 (Six) Hours As Needed for Nausea or Vomiting.      sodium chloride 7 % nebulizer solution nebulizer solution Take 4 mL by nebulization Every 4 (Four) Hours As Needed (pt takes as needed at home).      tiZANidine (ZANAFLEX) 2 MG tablet Take 1 tablet by mouth Daily As Needed for Muscle Spasms.      tuberculin (Aplisol) 5 UNIT/0.1ML injection Inject 0.1 mL into the appropriate area of the skin as directed by provider 1 (One) Time.           PROCEDURES  Procedures      Critical care provider statement:    Critical care time (minutes): 45.   Critical care time was exclusive of:  Separately billable procedures and treating other patients   Critical care was necessary to treat or prevent imminent or life-threatening deterioration of the following conditions:  Respiratory Failure   Critical care was time spent personally by me on the following activities:  Development of treatment plan with patient or surrogate, discussions with consultants, evaluation of  patient's response to treatment, examination of patient, obtaining history from patient or surrogate, ordering and performing treatments and interventions, ordering and review of laboratory studies, ordering and review of radiographic studies, pulse oximetry, re-evaluation of patient's condition and review of old charts. Critical Care indicators:        PROGRESS, DATA ANALYSIS, CONSULTS, AND MEDICAL DECISION MAKING  All labs have been independently interpreted by me.  All radiology studies have been reviewed by me. All EKG's have been independently viewed and interpreted by me.  Discussion below represents my analysis of pertinent findings related to patient's condition, differential diagnosis, treatment plan and final disposition.    Differential diagnosis includes but is not limited to viral syndrome, pneumonia, aspiration, CHF exacerbation, renal failure, anemia.    Clinical Scores:                                       ED Course as of 02/16/25 1349   Sun Feb 16, 2025   1154 Patient suctioned. Posterior pharynx with thick mucus [TR]   1155 Patient is full code per paperwork. [TR]   1311 Influenza A H1 2009 PCR(!): Detected [TR]   1311 XR Chest 1 View  My independent interpretation of the imaging study is no dense consolidation [TR]   1325 EKG PROCEDURE    EKG time: 1203  Rhythm/Rate: Normal sinus, rate 97  P waves and NE: Normal P, normal NE  QRS, axis: Narrow QRS, normal axis  ST and T waves: No acute    Independently Interpreted by me  Not significantly changed compared to prior 11/25/2024   [TR]   1326 Influenza A H1 2009 PCR(!): Detected [TR]   1326 pCO2, Arterial: 43.2 [TR]   1326 Patient is ill-appearing.  His laboratory evaluation looks much better than the patient does on clinical exam.  He is positive for flu.  Plan admission for further management. [TR]   1339 Discussing with Dr Chau with [facility]. He agrees to admit.   [TR]   1342 I updated the patient at the bedside.  Plan admission.  He is agreeable.  [TR]      ED Course User Index  [TR] Malik Benedict MD             AS OF 13:49 EST VITALS:    BP - 116/58  HR - 96  TEMP - 98.9 °F (37.2 °C)  O2 SATS - 95%    COMPLEXITY OF CARE  The patient requires admission.      DIAGNOSIS  Final diagnoses:   Influenza A         DISPOSITION  ED Disposition       ED Disposition   Decision to Admit    Condition   --    Comment   Level of Care: Telemetry [5]   Diagnosis: Influenza A [929309]   Admitting Physician: AIYANA PLEITEZ [[id number]]   Attending Physician: AIYANA PLEITEZ [[id number]]   Is patient appropriate for Inpatient Observation Unit?: No [0]                  Please note that portions of this document were completed with a voice recognition program.    Note Disclaimer: At [facility], we believe that sharing information builds trust and better relationships. You are receiving this note because you recently visited [facility]. It is possible you will see health information before a provider has talked with you about it. This kind of information can be easy to misunderstand. To help you fully understand what it means for your health, we urge you to discuss this note with your provider.         Malik Benedict MD  02/16/25 1341       Malik Benedict MD  02/16/25 1346       Malik Benedict MD  02/16/25 1347

## 2025-02-17 ENCOUNTER — APPOINTMENT (OUTPATIENT)
Dept: CT IMAGING | Facility: HOSPITAL | Age: 85
End: 2025-02-17
Payer: MEDICARE

## 2025-02-17 LAB
ANION GAP SERPL CALCULATED.3IONS-SCNC: 13.4 MMOL/L (ref 5–15)
BUN SERPL-MCNC: 19 MG/DL (ref 8–23)
BUN/CREAT SERPL: 23.2 (ref 7–25)
CALCIUM SPEC-SCNC: 8.2 MG/DL (ref 8.6–10.5)
CHLORIDE SERPL-SCNC: 98 MMOL/L (ref 98–107)
CO2 SERPL-SCNC: 26.6 MMOL/L (ref 22–29)
CREAT SERPL-MCNC: 0.82 MG/DL (ref 0.76–1.27)
DEPRECATED RDW RBC AUTO: 63.5 FL (ref 37–54)
EGFRCR SERPLBLD CKD-EPI 2021: 85.5 ML/MIN/1.73
ERYTHROCYTE [DISTWIDTH] IN BLOOD BY AUTOMATED COUNT: 19.8 % (ref 12.3–15.4)
GLUCOSE SERPL-MCNC: 144 MG/DL (ref 65–99)
HCT VFR BLD AUTO: 38 % (ref 37.5–51)
HGB BLD-MCNC: 12.4 G/DL (ref 13–17.7)
MCH RBC QN AUTO: 29.4 PG (ref 26.6–33)
MCHC RBC AUTO-ENTMCNC: 32.6 G/DL (ref 31.5–35.7)
MCV RBC AUTO: 90 FL (ref 79–97)
PLATELET # BLD AUTO: 193 10*3/MM3 (ref 140–450)
PMV BLD AUTO: 9.2 FL (ref 6–12)
POTASSIUM SERPL-SCNC: 3.5 MMOL/L (ref 3.5–5.2)
POTASSIUM SERPL-SCNC: 3.9 MMOL/L (ref 3.5–5.2)
RBC # BLD AUTO: 4.22 10*6/MM3 (ref 4.14–5.8)
SODIUM SERPL-SCNC: 138 MMOL/L (ref 136–145)
WBC NRBC COR # BLD AUTO: 7 10*3/MM3 (ref 3.4–10.8)

## 2025-02-17 PROCEDURE — 97161 PT EVAL LOW COMPLEX 20 MIN: CPT

## 2025-02-17 PROCEDURE — 63710000001 REVEFENACIN 175 MCG/3ML SOLUTION: Performed by: INTERNAL MEDICINE

## 2025-02-17 PROCEDURE — 94664 DEMO&/EVAL PT USE INHALER: CPT

## 2025-02-17 PROCEDURE — 25010000002 METHYLPREDNISOLONE PER 125 MG: Performed by: INTERNAL MEDICINE

## 2025-02-17 PROCEDURE — 94761 N-INVAS EAR/PLS OXIMETRY MLT: CPT

## 2025-02-17 PROCEDURE — 94799 UNLISTED PULMONARY SVC/PX: CPT

## 2025-02-17 PROCEDURE — G0378 HOSPITAL OBSERVATION PER HR: HCPCS

## 2025-02-17 PROCEDURE — 97530 THERAPEUTIC ACTIVITIES: CPT

## 2025-02-17 PROCEDURE — 94760 N-INVAS EAR/PLS OXIMETRY 1: CPT

## 2025-02-17 PROCEDURE — 25010000002 ENOXAPARIN PER 10 MG: Performed by: INTERNAL MEDICINE

## 2025-02-17 PROCEDURE — 63710000001 PREDNISONE PER 1 MG: Performed by: INTERNAL MEDICINE

## 2025-02-17 PROCEDURE — 85027 COMPLETE CBC AUTOMATED: CPT | Performed by: INTERNAL MEDICINE

## 2025-02-17 PROCEDURE — 84132 ASSAY OF SERUM POTASSIUM: CPT | Performed by: INTERNAL MEDICINE

## 2025-02-17 PROCEDURE — 71250 CT THORAX DX C-: CPT

## 2025-02-17 PROCEDURE — 25010000002 CEFTRIAXONE PER 250 MG: Performed by: INTERNAL MEDICINE

## 2025-02-17 PROCEDURE — 92610 EVALUATE SWALLOWING FUNCTION: CPT

## 2025-02-17 PROCEDURE — 36415 COLL VENOUS BLD VENIPUNCTURE: CPT | Performed by: INTERNAL MEDICINE

## 2025-02-17 PROCEDURE — 80048 BASIC METABOLIC PNL TOTAL CA: CPT | Performed by: INTERNAL MEDICINE

## 2025-02-17 RX ORDER — POTASSIUM CHLORIDE 1500 MG/1
40 TABLET, EXTENDED RELEASE ORAL EVERY 4 HOURS
Status: COMPLETED | OUTPATIENT
Start: 2025-02-17 | End: 2025-02-17

## 2025-02-17 RX ORDER — PREDNISONE 20 MG/1
20 TABLET ORAL 2 TIMES DAILY WITH MEALS
Status: DISCONTINUED | OUTPATIENT
Start: 2025-02-17 | End: 2025-02-19 | Stop reason: HOSPADM

## 2025-02-17 RX ADMIN — Medication 4 ML: at 06:44

## 2025-02-17 RX ADMIN — METHYLPREDNISOLONE SODIUM SUCCINATE 60 MG: 125 INJECTION, POWDER, FOR SOLUTION INTRAMUSCULAR; INTRAVENOUS at 09:28

## 2025-02-17 RX ADMIN — GUAIFENESIN 600 MG: 600 TABLET, MULTILAYER, EXTENDED RELEASE ORAL at 09:28

## 2025-02-17 RX ADMIN — CEFTRIAXONE SODIUM 1000 MG: 1 INJECTION, POWDER, FOR SOLUTION INTRAMUSCULAR; INTRAVENOUS at 18:02

## 2025-02-17 RX ADMIN — ENOXAPARIN SODIUM 40 MG: 100 INJECTION SUBCUTANEOUS at 20:40

## 2025-02-17 RX ADMIN — OSELTAMIVIR PHOSPHATE 30 MG: 30 CAPSULE ORAL at 20:41

## 2025-02-17 RX ADMIN — PANTOPRAZOLE SODIUM 40 MG: 40 TABLET, DELAYED RELEASE ORAL at 09:28

## 2025-02-17 RX ADMIN — FUROSEMIDE 20 MG: 20 TABLET ORAL at 09:28

## 2025-02-17 RX ADMIN — SUCRALFATE 1 G: 1 TABLET ORAL at 18:02

## 2025-02-17 RX ADMIN — GABAPENTIN 300 MG: 300 CAPSULE ORAL at 20:41

## 2025-02-17 RX ADMIN — IPRATROPIUM BROMIDE AND ALBUTEROL SULFATE 3 ML: 2.5; .5 SOLUTION RESPIRATORY (INHALATION) at 10:33

## 2025-02-17 RX ADMIN — POTASSIUM CHLORIDE 40 MEQ: 1500 TABLET, EXTENDED RELEASE ORAL at 13:14

## 2025-02-17 RX ADMIN — GABAPENTIN 300 MG: 300 CAPSULE ORAL at 15:52

## 2025-02-17 RX ADMIN — GABAPENTIN 300 MG: 300 CAPSULE ORAL at 09:28

## 2025-02-17 RX ADMIN — FERROUS SULFATE TAB 325 MG (65 MG ELEMENTAL FE) 325 MG: 325 (65 FE) TAB at 09:28

## 2025-02-17 RX ADMIN — IPRATROPIUM BROMIDE AND ALBUTEROL SULFATE 3 ML: 2.5; .5 SOLUTION RESPIRATORY (INHALATION) at 21:29

## 2025-02-17 RX ADMIN — Medication 1 TABLET: at 09:27

## 2025-02-17 RX ADMIN — IPRATROPIUM BROMIDE AND ALBUTEROL SULFATE 3 ML: 2.5; .5 SOLUTION RESPIRATORY (INHALATION) at 06:40

## 2025-02-17 RX ADMIN — SUCRALFATE 1 G: 1 TABLET ORAL at 09:28

## 2025-02-17 RX ADMIN — BUDESONIDE AND FORMOTEROL FUMARATE DIHYDRATE 2 PUFF: 160; 4.5 AEROSOL RESPIRATORY (INHALATION) at 06:45

## 2025-02-17 RX ADMIN — GUAIFENESIN 600 MG: 600 TABLET, MULTILAYER, EXTENDED RELEASE ORAL at 20:41

## 2025-02-17 RX ADMIN — POTASSIUM CHLORIDE 40 MEQ: 1500 TABLET, EXTENDED RELEASE ORAL at 09:27

## 2025-02-17 RX ADMIN — PREDNISONE 20 MG: 20 TABLET ORAL at 18:02

## 2025-02-17 RX ADMIN — IPRATROPIUM BROMIDE AND ALBUTEROL SULFATE 3 ML: 2.5; .5 SOLUTION RESPIRATORY (INHALATION) at 14:35

## 2025-02-17 RX ADMIN — BUDESONIDE AND FORMOTEROL FUMARATE DIHYDRATE 2 PUFF: 160; 4.5 AEROSOL RESPIRATORY (INHALATION) at 21:32

## 2025-02-17 RX ADMIN — METHYLPREDNISOLONE SODIUM SUCCINATE 60 MG: 125 INJECTION, POWDER, FOR SOLUTION INTRAMUSCULAR; INTRAVENOUS at 03:14

## 2025-02-17 RX ADMIN — OSELTAMIVIR PHOSPHATE 30 MG: 30 CAPSULE ORAL at 09:28

## 2025-02-17 RX ADMIN — POTASSIUM CHLORIDE 20 MEQ: 1.5 POWDER, FOR SOLUTION ORAL at 09:27

## 2025-02-17 RX ADMIN — FLUOXETINE 20 MG: 20 CAPSULE ORAL at 09:27

## 2025-02-17 RX ADMIN — SUCRALFATE 1 G: 1 TABLET ORAL at 13:14

## 2025-02-17 RX ADMIN — REVEFENACIN 175 MCG: 175 SOLUTION RESPIRATORY (INHALATION) at 10:36

## 2025-02-17 RX ADMIN — FUROSEMIDE 20 MG: 20 TABLET ORAL at 20:42

## 2025-02-17 RX ADMIN — SUCRALFATE 1 G: 1 TABLET ORAL at 20:41

## 2025-02-17 NOTE — PLAN OF CARE
Problem: Adult Inpatient Plan of Care  Goal: Plan of Care Review  Flowsheets (Taken 2/16/2025 1830)  Progress: no change  Outcome Evaluation: admit from the er with flu A, baseline 2lnc,  Plan of Care Reviewed With: patient  Goal: Optimal Comfort and Wellbeing  Intervention: Provide Person-Centered Care  Recent Flowsheet Documentation  Taken 2/16/2025 1700 by Irene Flanagan RN  Trust Relationship/Rapport:   care explained   questions answered   reassurance provided   thoughts/feelings acknowledged   Goal Outcome Evaluation:  Plan of Care Reviewed With: patient        Progress: no change  Outcome Evaluation: admit from the er with flu A, baseline 2lnc,

## 2025-02-17 NOTE — THERAPY EVALUATION
Acute Care - Speech Language Pathology   Swallow Initial Evaluation [facility]     Patient Name: Tony Leonard  : 1940  MRN: [id number]  Today's Date: 2025               Admit Date: 2025    Visit Dx:     ICD-10-CM ICD-9-CM   1. Influenza A  J10.1 487.1     Patient Active Problem List   Diagnosis    Thrush, oral    ADD (attention deficit disorder)    Hemorrhoids    Bronchiectasis with acute lower respiratory infection    Diverticul disease small and large intestine, no perforati or abscess    Benign non-nodular prostatic hyperplasia without lower urinary tract symptoms    Gastroesophageal reflux disease    Malaise and fatigue    GERD (gastroesophageal reflux disease)    Environmental allergies    Hemoptysis    Dyslipidemia    Primary osteoarthritis involving multiple joints    Pneumonia of right lower lobe due to infectious organism    Abnormal EKG    COPD (chronic obstructive pulmonary disease)    Mild malnutrition    Acute on chronic respiratory failure with hypoxia    Fracture, intertrochanteric, right femur, closed, initial encounter    Chronic diastolic CHF (congestive heart failure)    Hematoma of frontal scalp    Postoperative anemia due to acute blood loss    Urinary retention    Hemorrhagic disorder due to circulating anticoagulants    History of fracture of right hip    Closed fracture of right hip with routine healing    Chronic low back pain with sciatica    Change in bowel function    Rectal bleeding    Prostate cancer    On home oxygen therapy    Acute viral bronchitis    Neuropathy    Plantar fasciitis    HTN (hypertension)    Bilateral lower extremity edema    Microscopic hematuria    Acute midline low back pain with right-sided sciatica    Spinal stenosis, lumbar region with neurogenic claudication    Compression deformity of vertebra    Rectal bleed    Esophagitis    Angiectasia    Hiatal hernia    Overweight (BMI 25.0-29.9)    Leukocytosis    Bilateral hip pain    Elevated  troponin level not due myocardial infarction    Nocturnal hypoxia    Pneumonia of right upper lobe due to infectious organism    NSTEMI (non-ST elevated myocardial infarction)    Chronic respiratory failure with hypoxia    Paroxysmal atrial fibrillation    Acute exacerbation of chronic obstructive pulmonary disease (COPD)    Anemia    Non-compliant patient    Hypokalemia    Spondylolisthesis of lumbar region    Elevated troponin    Rhabdomyolysis    Multiple contusions    Fall    Dizziness    Contusion of hip    Pulmonary embolism    COPD with acute exacerbation    Influenza A    Altered mental status     Past Medical History:   Diagnosis Date    ADHD (attention deficit hyperactivity disorder)     Bronchiectasis     CHF (congestive heart failure)     Colon polyp     COPD (chronic obstructive pulmonary disease)     Diverticulosis     Hard of hearing     On home oxygen therapy     2 LITERS    Pneumonia     Prostate cancer 04/22/2019    Spinal stenosis, lumbar region with neurogenic claudication 08/02/2022     Past Surgical History:   Procedure Laterality Date    BRONCHOSCOPY N/A 04/30/2016    Procedure: BRONCHOSCOPY with BAL of right lower lobe and left  lower lobe.  ;  Surgeon: Nando Diaz MD;  Location: [facility] ENDOSCOPY;  Service:     BRONCHOSCOPY N/A 04/28/2017    Procedure: BRONCHOSCOPY;  Surgeon: Katharina Salter MD;  Location: [facility] ENDOSCOPY;  Service:     BRONCHOSCOPY Bilateral 06/29/2017    Procedure: BRONCHOSCOPY WITH WASHINGS;  Surgeon: Katharina Salter MD;  Location: [facility] ENDOSCOPY;  Service:     BRONCHOSCOPY N/A 01/22/2018    Procedure: BRONCHOSCOPY AT BEDSIDE with BAL;  Surgeon: Katharina Salter MD;  Location: [facility] ENDOSCOPY;  Service:     BRONCHOSCOPY N/A 01/30/2018    Procedure: BRONCHOSCOPY with BAL and washing;  Surgeon: Shreyas Wild MD;  Location: [facility] ENDOSCOPY;  Service:     BRONCHOSCOPY Bilateral 04/24/2018    Procedure: BRONCHOSCOPY WITH WASHING;  Surgeon: Katharina Salter MD;  Location:  [facility] ENDOSCOPY;  Service: Pulmonary    BRONCHOSCOPY N/A 12/28/2019    Procedure: BRONCHOSCOPY with bilateral washing;  Surgeon: Katharina Salter MD;  Location: [facility] ENDOSCOPY;  Service: Pulmonary    BRONCHOSCOPY Bilateral 01/04/2023    Procedure: BRONCHOSCOPY with lavage;  Surgeon: Katharina Salter MD;  Location: [facility] ENDOSCOPY;  Service: Pulmonary;  Laterality: Bilateral;  mucus plugging    CARDIAC CATHETERIZATION N/A 01/29/2023    Procedure: Left Heart Cath;  Surgeon: Johnnie Ang MD;  Location: [facility] CATH INVASIVE LOCATION;  Service: Cardiology;  Laterality: N/A;    CARDIAC CATHETERIZATION N/A 01/29/2023    Procedure: Coronary angiography;  Surgeon: Johnnie Ang MD;  Location: [facility] CATH INVASIVE LOCATION;  Service: Cardiology;  Laterality: N/A;    COLONOSCOPY  05/17/2013    eh, ih, tort, sig tics    COLONOSCOPY N/A 05/10/2019    Non-thrombosed external hemorrhoids found on perianal exam, Diverticulosis, Tortuous colon, One 5 mm polyp in the mid ascending colon, IH. Path: Tubular adenoma.     COLONOSCOPY N/A 09/24/2022    Procedure: COLONOSCOPY to cecum and TI with argon plasma coagulation.;  Surgeon: Bruce Black MD;  Location: [facility] ENDOSCOPY;  Service: Gastroenterology;  Laterality: N/A;  pre- GI bleeding  post- radiation proctitis, diverticulosis    ENDOSCOPY  03/19/2015    z line irreg, hh    ENDOSCOPY N/A 09/24/2022    Procedure: ESOPHAGOGASTRODUODENOSCOPY;  Surgeon: Bruce Black MD;  Location: [facility] ENDOSCOPY;  Service: Gastroenterology;  Laterality: N/A;  pre- GI bleeding  post- esophagitis, hiatal hernia    HIP OPEN REDUCTION Right 01/17/2018    Procedure: HIP OPEN REDUCTION INTERNAL FIXATION WITH DYNAMIC HIP SCREW;  Surgeon: Les Black MD;  Location: [facility] MAIN OR;  Service:     SPINE SURGERY      TONSILLECTOMY      TOTAL HIP ARTHROPLASTY REVISION Right 09/23/2019    Procedure: HIP  REVISION RIGHT;  Surgeon: Isauro Warner MD;  Location: [facility] MAIN OR;   Service: Orthopedics       SLP Recommendation and Plan  SLP Swallowing Diagnosis: other (see comments) (feeding difficulties) (02/17/25 1015)  SLP Diet Recommendation: soft to chew textures, thin liquids (02/17/25 1015)  Recommended Precautions and Strategies: upright posture during/after eating (02/17/25 1015)  SLP Rec. for Method of Medication Administration: as tolerated (02/17/25 1015)     Monitor for Signs of Aspiration: notify SLP if any concerns (02/17/25 1015)  Recommended Diagnostics: reassess via clinical swallow evaluation (02/17/25 1015)  Swallow Criteria for Skilled Therapeutic Interventions Met: demonstrates skilled criteria (02/17/25 1015)  Anticipated Discharge Disposition (SLP): unknown (02/17/25 1015)  Rehab Potential/Prognosis, Swallowing: good, to achieve stated therapy goals (02/17/25 1015)  Therapy Frequency (Swallow): PRN (02/17/25 1015)  Predicted Duration Therapy Intervention (Days): until discharge (02/17/25 1015)  Oral Care Recommendations: Oral Care BID/PRN, Toothbrush (02/17/25 1015)                                        Outcome Evaluation: Clinical swallow completed w pt under observation for Influenza A. Pt failed RN swallow screen and SLP swallow eval ordered. Oropharyngeal swallow appears functional. Suggest monitor clinically. Suggest diagnostic tx; instrumental swallow eval pending clinical course. Pt agreeable to soft-to-chew textures. Diet texture recommendation: Soft-to-chew, given missing molars. Thin liquids. Meds: Per pt preference. Aspiration precautions: Fully upright for all oral intake.      SWALLOW EVALUATION (Last 72 Hours)       SLP Adult Swallow Evaluation       Row Name 02/17/25 1015       Rehab Evaluation    Document Type evaluation  -BB    Subjective Information no complaints  -BB    Patient Observations alert;cooperative;agree to therapy  -BB    Patient Effort good  -BB    Symptoms Noted During/After Treatment none  -BB       General Information    Patient  Profile Reviewed yes  -BB    Pertinent History Of Current Problem 83 yo under observation for Influenza A. Pt living w COPD, CHF, and GERD. Pt failed RN swallow screen. ST consult for swallowing.  -BB    Current Method of Nutrition NPO  -BB    Precautions/Limitations, Vision WFL;for purposes of eval  -BB    Precautions/Limitations, Hearing hearing impairment, bilaterally;other (see comments)  pt is [place]  -BB    Prior Level of Function-Communication WFL  -BB    Prior Level of Function-Swallowing no diet consistency restrictions  -BB    Plans/Goals Discussed with patient;agreed upon  -BB    Barriers to Rehab none identified  -BB    Patient's Goals for Discharge return to regular diet  -BB       Pain    Pretreatment Pain Rating 0/10 - no pain  -BB    Posttreatment Pain Rating 0/10 - no pain  -BB       Oral Motor Structure and Function    Dentition Assessment missing teeth;natural, present and adequate  -BB    Secretion Management WNL/WFL  -BB    Mucosal Quality moist, healthy  -BB       Oral Musculature and Cranial Nerve Assessment    Oral Motor General Assessment WFL  -BB       General Eating/Swallowing Observations    Respiratory Support Currently in Use nasal cannula  -BB    O2 Liters 2L  -BB    Eating/Swallowing Skills self-fed  -BB    Positioning During Eating upright 90 degree;upright in chair  -BB    Utensils Used spoon;cup;straw  -BB    Consistencies Trialed regular textures;soft to chew textures;chopped;mixed consistency;thin liquids  -BB       Clinical Swallow Eval    Clinical Swallow Evaluation Summary Clinical swallow eval completed. No family present. Respiration is coarse.     Cognition: A/Ox3. Pt able to follow basic multi-step commands.   Expressive communication: appropriate.   Voice: Vocal quality and loudness appear good.   Cough: Not elicited.   Affect: Pleasant.   Speech: Intelligible.   Bulbar mech exam: Integrity of cranial nerves V, VII, IX/X and XII appear grossly intact.   Dysphagia symptoms:  pt denies.       Patient agreeable to PO trials.   Dysphagia signs: no overt clinical signs of aspiration across trials. Vertical mastication pattern, likely 2/2 missing teeth.   [place] swallow protocol: passed; good predictor of safe oral alimentation so long as the patient remains stable medically / neurologically.   Active problems related to dysphagia management: Influenza A.   Chronic problems related to dysphagia management: COPD, CHF, GERD.       Assessment / Plan: Clinical swallow completed w pt under observation for Influenza A. Pt failed RN swallow screen and SLP swallow eval ordered. Oropharyngeal swallow appears functional. Suggest monitor clinically. Instrumental swallow eval pending clinical course. Pt agreeable to soft-to-chew textures. Diet texture recommendation: Soft-to-chew, given missing molars. Thin liquids. Meds: Per pt preference. Aspiration precautions: Fully upright for all oral intake.  -BB       SLP Evaluation Clinical Impression    SLP Swallowing Diagnosis other (see comments)  feeding difficulties  -BB    Rehab Potential/Prognosis, Swallowing good, to achieve stated therapy goals  -BB    Swallow Criteria for Skilled Therapeutic Interventions Met demonstrates skilled criteria  -BB       Recommendations    Therapy Frequency (Swallow) PRN  -BB    Predicted Duration Therapy Intervention (Days) until discharge  -BB    SLP Diet Recommendation soft to chew textures;thin liquids  -BB    Recommended Diagnostics reassess via clinical swallow evaluation  -BB    Recommended Precautions and Strategies upright posture during/after eating  -BB    Oral Care Recommendations Oral Care BID/PRN;Toothbrush  -BB    SLP Rec. for Method of Medication Administration as tolerated  -BB    Monitor for Signs of Aspiration notify SLP if any concerns  -BB    Anticipated Discharge Disposition (SLP) unknown  -BB       Swallow Goals (SLP)    Swallow LTGs Patient will demonstrate functional swallow for  -BB    Swallow STGs  diet tolerance goal selection (SLP)  -BB    Diet Tolerance Goal Selection (SLP) Patient will tolerate trials of  -BB       (LTG) Patient will demonstrate functional swallow for    Diet Texture (Demonstrate functional swallow) regular textures  -BB    Liquid viscosity (Demonstrate functional swallow) thin liquids  -BB    Brown (Demonstrate functional swallow) independently (over 90% accuracy)  -BB    Time Frame (Demonstrate functional swallow) by discharge  -BB              User Key  (r) = Recorded By, (t) = Taken By, (c) = Cosigned By      Initials Name Effective Dates    Missael Domínguez SLP 02/19/23 -                     EDUCATION  The patient has been educated in the following areas:   Dysphagia (Swallowing Impairment).        SLP GOALS       Row Name 02/17/25 1015             (LTG) Patient will demonstrate functional swallow for    Diet Texture (Demonstrate functional swallow) regular textures  -BB      Liquid viscosity (Demonstrate functional swallow) thin liquids  -BB      Brown (Demonstrate functional swallow) independently (over 90% accuracy)  -BB      Time Frame (Demonstrate functional swallow) by discharge  -BB                User Key  (r) = Recorded By, (t) = Taken By, (c) = Cosigned By      Initials Name Provider Type    Missael Domínguez SLP Speech and Language Pathologist                         Time Calculation:    Time Calculation- SLP       Row Name 02/17/25 1318             Time Calculation- SLP    SLP Start Time 1015  -BB      SLP Stop Time 1115  -BB      SLP Time Calculation (min) 60 min  -BB      SLP Received On 02/17/25  -BB         Untimed Charges    SLP Eval/Re-eval  ST Eval Oral Pharyng Swallow - 00885  -BB      01868-OV Eval Oral Pharyng Swallow Minutes 60  -BB         Total Minutes    Untimed Charges Total Minutes 60  -BB       Total Minutes 60  -BB                User Key  (r) = Recorded By, (t) = Taken By, (c) = Cosigned By      Initials Name Provider Type    MAYLIN Castro  BILL Canas Speech and Language Pathologist                    Therapy Charges for Today       Code Description Service Date Service Provider Modifiers Qty    [id number]  ST EVAL ORAL PHARYNG SWALLOW 4 2/17/2025 Missael Castro SLP GN 1                 BILL Tinoco  2/17/2025

## 2025-02-17 NOTE — PLAN OF CARE
Problem: Adult Inpatient Plan of Care  Goal: Plan of Care Review  Outcome: Progressing  Flowsheets  Taken 2/17/2025 1832 by Abimael White RN  Progress: improving  Outcome Evaluation: Ax4. VSS. At his 2L basline for O2. Speech saw today, cleared for reg diet soft to chew. No reports of pain. Will likely return to his facility in 1-2 days.  Taken 2/17/2025 0935 by Diana Morales PT  Plan of Care Reviewed With: patient  Goal: Patient-Specific Goal (Individualized)  Outcome: Progressing  Goal: Absence of Hospital-Acquired Illness or Injury  Outcome: Progressing  Intervention: Identify and Manage Fall Risk  Recent Flowsheet Documentation  Taken 2/17/2025 1800 by Abimael White RN  Safety Promotion/Fall Prevention:   activity supervised   assistive device/personal items within reach   clutter free environment maintained   fall prevention program maintained   nonskid shoes/slippers when out of bed   gait belt   room organization consistent   safety round/check completed  Taken 2/17/2025 1600 by Abimael White RN  Safety Promotion/Fall Prevention:   activity supervised   assistive device/personal items within reach   clutter free environment maintained   fall prevention program maintained   nonskid shoes/slippers when out of bed   gait belt   room organization consistent   safety round/check completed  Taken 2/17/2025 1420 by Abimael White RN  Safety Promotion/Fall Prevention:   activity supervised   assistive device/personal items within reach   clutter free environment maintained   fall prevention program maintained   nonskid shoes/slippers when out of bed   gait belt   room organization consistent   safety round/check completed  Taken 2/17/2025 1200 by Abimael White, RN  Safety Promotion/Fall Prevention:   activity supervised   assistive device/personal items within reach   clutter free environment maintained   fall prevention program maintained   nonskid shoes/slippers when out of bed   gait belt   room  organization consistent   safety round/check completed  Taken 2/17/2025 1000 by Abimael White RN  Safety Promotion/Fall Prevention:   activity supervised   assistive device/personal items within reach   clutter free environment maintained   fall prevention program maintained   nonskid shoes/slippers when out of bed   gait belt   room organization consistent   safety round/check completed  Taken 2/17/2025 0845 by Abimael White RN  Safety Promotion/Fall Prevention:   activity supervised   assistive device/personal items within reach   clutter free environment maintained   fall prevention program maintained   nonskid shoes/slippers when out of bed   gait belt   room organization consistent   safety round/check completed  Goal: Optimal Comfort and Wellbeing  Outcome: Progressing  Goal: Readiness for Transition of Care  Outcome: Progressing     Problem: Skin Injury Risk Increased  Goal: Skin Health and Integrity  Outcome: Progressing  Intervention: Optimize Skin Protection  Recent Flowsheet Documentation  Taken 2/17/2025 1600 by Abimael Wihte RN  Activity Management: up in chair  Taken 2/17/2025 1200 by Abimael White RN  Activity Management: up to bedside commode     Problem: Fall Injury Risk  Goal: Absence of Fall and Fall-Related Injury  Outcome: Progressing  Intervention: Promote Injury-Free Environment  Recent Flowsheet Documentation  Taken 2/17/2025 1800 by Abimael White RN  Safety Promotion/Fall Prevention:   activity supervised   assistive device/personal items within reach   clutter free environment maintained   fall prevention program maintained   nonskid shoes/slippers when out of bed   gait belt   room organization consistent   safety round/check completed  Taken 2/17/2025 1600 by Abimael White RN  Safety Promotion/Fall Prevention:   activity supervised   assistive device/personal items within reach   clutter free environment maintained   fall prevention program maintained   nonskid  shoes/slippers when out of bed   gait belt   room organization consistent   safety round/check completed  Taken 2/17/2025 1420 by Abimael White RN  Safety Promotion/Fall Prevention:   activity supervised   assistive device/personal items within reach   clutter free environment maintained   fall prevention program maintained   nonskid shoes/slippers when out of bed   gait belt   room organization consistent   safety round/check completed  Taken 2/17/2025 1200 by Abimael White RN  Safety Promotion/Fall Prevention:   activity supervised   assistive device/personal items within reach   clutter free environment maintained   fall prevention program maintained   nonskid shoes/slippers when out of bed   gait belt   room organization consistent   safety round/check completed  Taken 2/17/2025 1000 by Abimael White RN  Safety Promotion/Fall Prevention:   activity supervised   assistive device/personal items within reach   clutter free environment maintained   fall prevention program maintained   nonskid shoes/slippers when out of bed   gait belt   room organization consistent   safety round/check completed  Taken 2/17/2025 0845 by Abimael White RN  Safety Promotion/Fall Prevention:   activity supervised   assistive device/personal items within reach   clutter free environment maintained   fall prevention program maintained   nonskid shoes/slippers when out of bed   gait belt   room organization consistent   safety round/check completed   Goal Outcome Evaluation:           Progress: improving  Outcome Evaluation: Ax4. VSS. At his 2L basline for O2. Speech saw today, cleared for reg diet soft to chew. No reports of pain. Will likely return to his facility in 1-2 days.

## 2025-02-17 NOTE — NURSING NOTE
Reason for Visit: WOC Team consult for perineal excoriation. Pt seen for evaluation of perineal/ perianal skin. He is alert and oriented, able to turn and reposition self in bed. He is wearing a brief and able to point to the location of his skin issues. He is found to have moist intertrigo in gluteal cleft with mild perianal redness and excoriation. John heels are negative for breakdown or erythema. He is on an accumax mattress.      02/17/25 0802   Wound 02/17/25 gluteal MASD (moisture associated skin damage)   Placement Date: 02/17/25   Location: (c) gluteal  Type: MASD (moisture associated skin damage)  Present on Original Admission: Yes   Base moist;pink  (MASD to perianal skin and in gluteal cleft)   Periwound intact;dry   Periwound Temperature warm   Drainage Amount none   Care, Wound barrier applied   Skin Interventions   Pressure Reduction Devices pressure-redistributing mattress utilized     Treatment Plan/Recommendations: Barrier cream should be applied 4x day and prn, he can continue on the accumax mattress, turning side to side. He should have a waffle cushion when up to the chair. Wound care and prevention standing orders placed into [facility].     Wound Team Follow up Plan: no follow up needed.

## 2025-02-17 NOTE — THERAPY EVALUATION
Patient Name: Tony Leonard  : 1940    MRN: [id number]                              Today's Date: 2025       Admit Date: 2025    Visit Dx:     ICD-10-CM ICD-9-CM   1. Influenza A  J10.1 487.1     Patient Active Problem List   Diagnosis    Thrush, oral    ADD (attention deficit disorder)    Hemorrhoids    Bronchiectasis with acute lower respiratory infection    Diverticul disease small and large intestine, no perforati or abscess    Benign non-nodular prostatic hyperplasia without lower urinary tract symptoms    Gastroesophageal reflux disease    Malaise and fatigue    GERD (gastroesophageal reflux disease)    Environmental allergies    Hemoptysis    Dyslipidemia    Primary osteoarthritis involving multiple joints    Pneumonia of right lower lobe due to infectious organism    Abnormal EKG    COPD (chronic obstructive pulmonary disease)    Mild malnutrition    Acute on chronic respiratory failure with hypoxia    Fracture, intertrochanteric, right femur, closed, initial encounter    Chronic diastolic CHF (congestive heart failure)    Hematoma of frontal scalp    Postoperative anemia due to acute blood loss    Urinary retention    Hemorrhagic disorder due to circulating anticoagulants    History of fracture of right hip    Closed fracture of right hip with routine healing    Chronic low back pain with sciatica    Change in bowel function    Rectal bleeding    Prostate cancer    On home oxygen therapy    Acute viral bronchitis    Neuropathy    Plantar fasciitis    HTN (hypertension)    Bilateral lower extremity edema    Microscopic hematuria    Acute midline low back pain with right-sided sciatica    Spinal stenosis, lumbar region with neurogenic claudication    Compression deformity of vertebra    Rectal bleed    Esophagitis    Angiectasia    Hiatal hernia    Overweight (BMI 25.0-29.9)    Leukocytosis    Bilateral hip pain    Elevated troponin level not due myocardial infarction    Nocturnal hypoxia     Pneumonia of right upper lobe due to infectious organism    NSTEMI (non-ST elevated myocardial infarction)    Chronic respiratory failure with hypoxia    Paroxysmal atrial fibrillation    Acute exacerbation of chronic obstructive pulmonary disease (COPD)    Anemia    Non-compliant patient    Hypokalemia    Spondylolisthesis of lumbar region    Elevated troponin    Rhabdomyolysis    Multiple contusions    Fall    Dizziness    Contusion of hip    Pulmonary embolism    COPD with acute exacerbation    Influenza A    Altered mental status     Past Medical History:   Diagnosis Date    ADHD (attention deficit hyperactivity disorder)     Bronchiectasis     CHF (congestive heart failure)     Colon polyp     COPD (chronic obstructive pulmonary disease)     Diverticulosis     Hard of hearing     On home oxygen therapy     2 LITERS    Pneumonia     Prostate cancer 04/22/2019    Spinal stenosis, lumbar region with neurogenic claudication 08/02/2022     Past Surgical History:   Procedure Laterality Date    BRONCHOSCOPY N/A 04/30/2016    Procedure: BRONCHOSCOPY with BAL of right lower lobe and left  lower lobe.  ;  Surgeon: Nando Diaz MD;  Location: [facility] ENDOSCOPY;  Service:     BRONCHOSCOPY N/A 04/28/2017    Procedure: BRONCHOSCOPY;  Surgeon: Katharina Salter MD;  Location: [facility] ENDOSCOPY;  Service:     BRONCHOSCOPY Bilateral 06/29/2017    Procedure: BRONCHOSCOPY WITH WASHINGS;  Surgeon: Katharina Salter MD;  Location: [facility] ENDOSCOPY;  Service:     BRONCHOSCOPY N/A 01/22/2018    Procedure: BRONCHOSCOPY AT BEDSIDE with BAL;  Surgeon: Katharina Salter MD;  Location: [facility] ENDOSCOPY;  Service:     BRONCHOSCOPY N/A 01/30/2018    Procedure: BRONCHOSCOPY with BAL and washing;  Surgeon: Shreyas Wild MD;  Location: [facility] ENDOSCOPY;  Service:     BRONCHOSCOPY Bilateral 04/24/2018    Procedure: BRONCHOSCOPY WITH WASHING;  Surgeon: Katharina Salter MD;  Location: [facility] ENDOSCOPY;  Service: Pulmonary    BRONCHOSCOPY N/A  12/28/2019    Procedure: BRONCHOSCOPY with bilateral washing;  Surgeon: Katharina Salter MD;  [facility];  Service: Pulmonary    BRONCHOSCOPY Bilateral 01/04/2023    Procedure: BRONCHOSCOPY with lavage;  Surgeon: Katharina Salter MD;  [facility];  Service: Pulmonary;  Laterality: Bilateral;  mucus plugging    CARDIAC CATHETERIZATION N/A 01/29/2023    Procedure: Left Heart Cath;  Surgeon: Johnnie Ang MD;  [facility];  Service: Cardiology;  Laterality: N/A;    CARDIAC CATHETERIZATION N/A 01/29/2023    Procedure: Coronary angiography;  Surgeon: Johnnie Ang MD;  [facility];  Service: Cardiology;  Laterality: N/A;    COLONOSCOPY  05/17/2013    eh, ih, tort, sig tics    COLONOSCOPY N/A 05/10/2019    Non-thrombosed external hemorrhoids found on perianal exam, Diverticulosis, Tortuous colon, One 5 mm polyp in the mid ascending colon, IH. Path: Tubular adenoma.     COLONOSCOPY N/A 09/24/2022    Procedure: COLONOSCOPY to cecum and TI with argon plasma coagulation.;  Surgeon: Bruce Black MD;  Location: [facility] ENDOSCOPY;  Service: Gastroenterology;  Laterality: N/A;  pre- GI bleeding  post- radiation proctitis, diverticulosis    ENDOSCOPY  03/19/2015    z line irreg, hh    ENDOSCOPY N/A 09/24/2022    Procedure: ESOPHAGOGASTRODUODENOSCOPY;  Surgeon: Bruce Black MD;  Location: [facility] ENDOSCOPY;  Service: Gastroenterology;  Laterality: N/A;  pre- GI bleeding  post- esophagitis, hiatal hernia    HIP OPEN REDUCTION Right 01/17/2018    Procedure: HIP OPEN REDUCTION INTERNAL FIXATION WITH DYNAMIC HIP SCREW;  Surgeon: Les Black MD;  Location: [facility] MAIN OR;  Service:     SPINE SURGERY      TONSILLECTOMY      TOTAL HIP ARTHROPLASTY REVISION Right 09/23/2019    Procedure: HIP  REVISION RIGHT;  Surgeon: Isauro Warner MD;  Location: [facility] MAIN OR;  Service: Orthopedics      General Information       Row Name  02/17/25 0933          Physical Therapy Time and Intention    Document Type evaluation  -     Mode of Treatment individual therapy;physical therapy  -[facility] Name 02/17/25 0933          General Information    Patient Profile Reviewed yes  -     Prior Level of Function independent:;gait;transfer;bed mobility  -     Existing Precautions/Restrictions fall  -     Barriers to Rehab none identified  -[facility] Name 02/17/25 0933          Living Environment    People in Home facility resident  LTC vs [facility]?  -       Row Name 02/17/25 0933          Home Main Entrance    Number of Stairs, Main Entrance none  -[facility] Name 02/17/25 0933          Stairs Within Home, Primary    Number of Stairs, Within Home, Primary none  -[facility] Name 02/17/25 0933          Cognition    Orientation Status (Cognition) oriented x 4  -[facility] Name 02/17/25 0933          Safety Issues/Impairments Affecting Functional Mobility    Impairments Affecting Function (Mobility) balance;endurance/activity tolerance;strength;pain;range of motion (ROM)  -               User Key  (r) = Recorded By, (t) = Taken By, (c) = Cosigned By      Initials Name Provider Type     Diana Morales, PT Physical Therapist                   Mobility       [facility] Name 02/17/25 0934          Bed Mobility    Bed Mobility supine-sit  -     Supine-Sit [place] (Bed Mobility) 1 person assist;verbal cues;minimum assist (75% patient effort)  -     Assistive Device (Bed Mobility) head of bed elevated;bed rails  -[facility] Name 02/17/25 0934          Sit-Stand Transfer    Sit-Stand [place] (Transfers) moderate assist (50% patient effort);1 person assist;verbal cues  -     Assistive Device (Sit-Stand Transfers) walker, front-wheeled  -[facility] Name 02/17/25 0934          Gait/Stairs (Locomotion)    [place] Level (Gait) verbal cues;1 person assist;minimum assist (75% patient effort)  -     Assistive Device (Gait) walker,  front-wheeled  -     Distance in Feet (Gait) 5  -     Deviations/Abnormal Patterns (Gait) antalgic;norm decreased;gait speed decreased;stride length decreased  -     Bilateral Gait Deviations forward flexed posture;heel strike decreased  -               User Key  (r) = Recorded By, (t) = Taken By, (c) = Cosigned By      Initials Name Provider Type     Diana Morlaes PT Physical Therapist                   Obj/Interventions       [facility] Name 02/17/25 0934          Range of Motion Comprehensive    General Range of Motion bilateral lower extremity ROM WFL  -BH       Row Name 02/17/25 0934          Strength Comprehensive (MMT)    General Manual Muscle Testing (MMT) Assessment lower extremity strength deficits identified  -     Comment, General Manual Muscle Testing (MMT) Assessment Generalized weakness, BLE grossly 4/5  -[facility] Name 02/17/25 0934          Motor Skills    Therapeutic Exercise --  10 reps B AP/LAQ/seated marches  -BH       Row Name 02/17/25 0934          Balance    Balance Assessment sitting static balance;sitting dynamic balance;standing static balance;standing dynamic balance  -     Static Sitting Balance supervision  -     Dynamic Sitting Balance standby assist  -     Position, Sitting Balance unsupported;sitting edge of bed  -     Static Standing Balance contact guard;verbal cues  -     Dynamic Standing Balance minimal assist;verbal cues  -     Position/Device Used, Standing Balance supported;walker, front-wheeled  -     Balance Interventions sitting;standing;sit to stand;supported;static;dynamic  -BH       Row Name 02/17/25 0934          Sensory Assessment (Somatosensory)    Sensory Assessment (Somatosensory) LE sensation intact  -               User Key  (r) = Recorded By, (t) = Taken By, (c) = Cosigned By      Initials Name Provider Type     Diana Morales PT Physical Therapist                   Goals/Plan       [facility] Name 02/17/25 0940          Bed Mobility  "Goal 1 (PT)    Activity/Assistive Device (Bed Mobility Goal 1, PT) bed mobility activities, all  -     [place] Level/Cues Needed (Bed Mobility Goal 1, PT) standby assist  -     Time Frame (Bed Mobility Goal 1, PT) 1 week  -       Row Name 02/17/25 0940          Transfer Goal 1 (PT)    Activity/Assistive Device (Transfer Goal 1, PT) transfers, all  -     [place]okes Level/Cues Needed (Transfer Goal 1, PT) contact guard required  -     Time Frame (Transfer Goal 1, PT) 1 week  -       Row Name 02/17/25 0940          Gait Training Goal 1 (PT)    Activity/Assistive Device (Gait Training Goal 1, PT) gait (walking locomotion)  -     Stokes Level (Gait Training Goal 1, PT) standby assist  -     Distance (Gait Training Goal 1, PT) 50ft  -     Time Frame (Gait Training Goal 1, PT) 1 week  -       Row Name 02/17/25 0918          Therapy Assessment/Plan (PT)    Planned Therapy Interventions (PT) balance training;bed mobility training;gait training;home exercise program;patient/family education;ROM (range of motion);strengthening;stretching;transfer training  -               User Key  (r) = Recorded By, (t) = Taken By, (c) = Cosigned By      Initials Name Provider Type     Diana Morales, PT Physical Therapist                   Clinical Impression       Row Name 02/17/25 0944          Pain    Pre/Posttreatment Pain Comment C/o B thigh pain once sitting EOB, unable to rate  -       Row Name 02/17/25 0947          Plan of Care Review    Plan of Care Reviewed With patient  -     Outcome Evaluation Pt is an 85 yo M admitted from a facility (LTC vs [facility]? - CCP confirming). Pt reports he uses a rollator for short distance gait, can ambulate to the bathroom but staff assists with baths. Pt reports 1 recent fall. Facility brings meals to his room, when asked about a WC he states he \"uses everything.\" Pt states he has worked with PT at his facility before, up to 5 days/week, but is not working " with them currently. Pt presents to PT with impaired strength, endurance, and balance limiting overall mobility. Pt transferred to EOB with min A x1 and stood with mod A x1. Forward flexed posture with difficulty extending B knees. Pt taking shuffled steps to the chair using rwx and min A x1 for safety. Pt completed LE exercises sitting UIC and left with needs met, reporting feeling better sitting up. PT will continue to follow, anticipate return to facility at [place] and recommend continued PT services.  -       Row Name 02/17/25 0935          Therapy Assessment/Plan (PT)    Patient/Family Therapy Goals Statement (PT) Return to [facility]  -     Rehab Potential (PT) good  -     Criteria for Skilled Interventions Met (PT) yes  -     Therapy Frequency (PT) 5 times/wk  -       Row Name 02/17/25 0935          Vital Signs    O2 Delivery Pre Treatment supplemental O2  -     O2 Delivery Intra Treatment supplemental O2  -     O2 Delivery Post Treatment supplemental O2  -       Row Name 02/17/25 0935          Positioning and Restraints    Pre-Treatment Position in bed  -     Post Treatment Position chair  -BH     In Chair notified nsg;reclined;call light within reach;encouraged to call for assist;exit alarm on  -               User Key  (r) = Recorded By, (t) = Taken By, (c) = Cosigned By      Initials Name Provider Type     Diana Morales, PT Physical Therapist                   Outcome Measures       Row Name 02/17/25 0941          How much help from another person do you currently need...    Turning from your back to your side while in flat bed without using bedrails? 3  -BH     Moving from lying on back to sitting on the side of a flat bed without bedrails? 3  -BH     Moving to and from a bed to a chair (including a wheelchair)? 3  -BH     Standing up from a chair using your arms (e.g., wheelchair, bedside chair)? 2  -BH     Climbing 3-5 steps with a railing? 2  -BH     To walk in hospital room? 2  -BH      "-[facility] 6 Clicks Score (PT) 15  -     Highest Level of Mobility Goal 4 --> Transfer to chair/commode  -       Row Name 02/17/25 0941          Functional Assessment    Outcome Measure Options -[facility] 6 Clicks Basic Mobility (PT)  -               User Key  (r) = Recorded By, (t) = Taken By, (c) = Cosigned By      Initials Name Provider Type     Diana Morales PT Physical Therapist                                 Physical Therapy Education       Title: PT OT SLP Therapies (Done)       Topic: Physical Therapy (Done)       Point: Mobility training (Done)       Learning Progress Summary            Patient Acceptance, D,E,TB, VU,NR by  at 2/17/2025 0941                      Point: Home exercise program (Done)       Learning Progress Summary            Patient Acceptance, D,E,TB, VU,NR by  at 2/17/2025 0941                      Point: Body mechanics (Done)       Learning Progress Summary            Patient Acceptance, D,E,TB, VU,NR by  at 2/17/2025 0941                      Point: Precautions (Done)       Learning Progress Summary            Patient Acceptance, D,E,TB, VU,NR by  at 2/17/2025 0941                                      User Key       Initials Effective Dates Name Provider Type Discipline     04/08/22 -  Diana Morales PT Physical Therapist PT                  PT Recommendation and Plan  Planned Therapy Interventions (PT): balance training, bed mobility training, gait training, home exercise program, patient/family education, ROM (range of motion), strengthening, stretching, transfer training  Outcome Evaluation: Pt is an 83 yo M admitted from a facility (LTC vs [facility]? - CCP confirming). Pt reports he uses a rollator for short distance gait, can ambulate to the bathroom but staff assists with baths. Pt reports 1 recent fall. Facility brings meals to his room, when asked about a WC he states he \"uses everything.\" Pt states he has worked with PT at his facility before, up to 5 days/week, but " is not working with them currently. Pt presents to PT with impaired strength, endurance, and balance limiting overall mobility. Pt transferred to EOB with min A x1 and stood with mod A x1. Forward flexed posture with difficulty extending B knees. Pt taking shuffled steps to the chair using rwx and min A x1 for safety. Pt completed LE exercises sitting UIC and left with needs met, reporting feeling better sitting up. PT will continue to follow, anticipate return to facility at [place] and recommend continued PT services.     Time Calculation:   PT Evaluation Complexity  History, PT Evaluation Complexity: 1-2 personal factors and/or comorbidities  Examination of Body Systems (PT Eval Complexity): total of 3 or more elements  Clinical Presentation (PT Evaluation Complexity): stable  Clinical Decision Making (PT Evaluation Complexity): low complexity  Overall Complexity (PT Evaluation Complexity): low complexity     PT Charges       Row Name 02/17/25 0941             Time Calculation    Start Time 0821  -      Stop Time 0840  -      Time Calculation (min) 19 min  -BH      PT Received On 02/17/25  -      PT - Next Appointment 02/18/25  -      PT Goal Re-Cert Due Date 02/24/25  -         Time Calculation- PT    Total Timed Code Minutes- PT 15 minute(s)  -         Timed Charges    28004 - Gait Training Minutes  5  -BH      93971 - PT Therapeutic Activity Minutes 10  -BH         Total Minutes    Timed Charges Total Minutes 15  -BH       Total Minutes 15  -BH                User Key  (r) = Recorded By, (t) = Taken By, (c) = Cosigned By      Initials Name Provider Type     Diana Morales, PT Physical Therapist                  Therapy Charges for Today       Code Description Service Date Service Provider Modifiers Qty    62085334632 HC PT THERAPEUTIC ACT EA 15 MIN 2/17/2025 Diana Morales, PT GP 1    63754057383 HC PT EVAL LOW COMPLEXITY 3 2/17/2025 Diana Morales, PT GP 1            PT G-Codes  Outcome Measure  Options: AM-PAC 6 Clicks Basic Mobility (PT)  AM-PAC 6 Clicks Score (PT): 15  PT Discharge Summary  Anticipated Discharge Disposition (PT): home with home health, assisted living, extended care facility    Diana Morales, PT  2/17/2025

## 2025-02-17 NOTE — PLAN OF CARE
"Goal Outcome Evaluation:  Plan of Care Reviewed With: patient           Outcome Evaluation: Pt is an 83 yo M admitted from a facility (LTC vs [facility]? - CCP confirming). Pt reports he uses a rollator for short distance gait, can ambulate to the bathroom but staff assists with baths. Pt reports 1 recent fall. Facility brings meals to his room, when asked about a WC he states he \"uses everything.\" Pt states he has worked with PT at his facility before, up to 5 days/week, but is not working with them currently. Pt presents to PT with impaired strength, endurance, and balance limiting overall mobility. Pt transferred to EOB with min A x1 and stood with mod A x1. Forward flexed posture with difficulty extending B knees. Pt taking shuffled steps to the chair using rwx and min A x1 for safety. Pt completed LE exercises sitting UIC and left with needs met, reporting feeling better sitting up. PT will continue to follow, anticipate return to facility at [place] and recommend continued PT services.    Anticipated Discharge Disposition (PT): home with home health, assisted living, extended care facility                        "

## 2025-02-17 NOTE — PLAN OF CARE
Goal Outcome Evaluation:              Outcome Evaluation: Clinical swallow completed w pt under observation for Influenza A. Pt failed RN swallow screen and SLP swallow eval ordered. Oropharyngeal swallow appears functional. Suggest monitor clinically. Suggest diagnostic tx; instrumental swallow eval pending clinical course. Pt agreeable to soft-to-chew textures.     Diet texture recommendation: Soft-to-chew, given missing molars. Thin liquids.     Meds: Per pt preference, as tolerated.     Aspiration precautions: Fully upright for all oral intake.        Anticipated Discharge Disposition (SLP): unknown          SLP Swallowing Diagnosis: other (see comments) (feeding difficulties) (02/17/25 1015)

## 2025-02-17 NOTE — PROGRESS NOTES
Name: Tony Leonard ADMIT: 2025   : 1940  PCP: Alfonso Goldstein MD    MRN: [id number] LOS: 0 days   AGE/SEX: 84 y.o. male  ROOM: [facility]   Subjective   Chief Complaint   Patient presents with    Fatigue    Weakness - Generalized     Patient is 84-year-old male with history of COPD, GERD, neuropathy who is a nursing home resident was sent to the hospital due to worsening confusion, decreased p.o. intake and dyspnea. He is on 2 L of oxygen that he normally uses at home and currently requiring 2 L. He was positive for the flu    ROS  No f/c  No n/v  No cp/palp  + soa/cough    Objective   Vital Signs  Temp:  [96.8 °F (36 °C)-99.9 °F (37.7 °C)] 96.8 °F (36 °C)  Heart Rate:  [79-96] 79  Resp:  [16-18] 18  BP: (101-131)/(50-95) 122/71  SpO2:  [92 %-100 %] 94 %  on  Flow (L/min) (Oxygen Therapy):  [2] 2;   Device (Oxygen Therapy): nasal cannula  Body mass index is 27.12 kg/m².    Physical Exam  Constitutional:       General: He is in acute distress.      Appearance: He is ill-appearing.   HENT:      Head: Normocephalic and atraumatic.   Eyes:      General: No scleral icterus.  Cardiovascular:      Rate and Rhythm: Normal rate and regular rhythm.      Heart sounds: Normal heart sounds.   Pulmonary:      Effort: Pulmonary effort is normal. Respiratory distress present.      Breath sounds: Rhonchi present. No wheezing.   Abdominal:      General: There is no distension.      Palpations: Abdomen is soft.      Tenderness: There is no abdominal tenderness.   Musculoskeletal:      Cervical back: Neck supple.   Neurological:      Mental Status: He is alert.   Psychiatric:         Behavior: Behavior normal.     Some poor memory     Results Review:       I reviewed the patient's new clinical results.  Results from last 7 days   Lab Units 25  0555 25  1158   WBC 10*3/mm3 7.00 8.37   HEMOGLOBIN g/dL 12.4* 13.3   PLATELETS 10*3/mm3 193 188     Results from last 7 days   Lab Units 25  0555  "02/16/25  1158   SODIUM mmol/L 138 139   POTASSIUM mmol/L 3.5 3.4*   CHLORIDE mmol/L 98 102   CO2 mmol/L 26.6 25.9   BUN mg/dL 19 19   CREATININE mg/dL 0.82 1.04   GLUCOSE mg/dL 144* 90   Estimated Creatinine Clearance: 80.7 mL/min (by C-G formula based on SCr of 0.82 mg/dL).  Results from last 7 days   Lab Units 02/16/25  1158   ALBUMIN g/dL 3.6   BILIRUBIN mg/dL 0.5   ALK PHOS U/L 55   AST (SGOT) U/L 26   ALT (SGPT) U/L 21     Results from last 7 days   Lab Units 02/17/25  0555 02/16/25  1158   CALCIUM mg/dL 8.2* 8.4*   ALBUMIN g/dL  --  3.6     Results from last 7 days   Lab Units 02/16/25  1158   PROCALCITONIN ng/mL 0.11   LACTATE mmol/L 1.9       Coag     HbA1C   Lab Results   Component Value Date    HGBA1C 5.39 04/04/2017     Infection   Results from last 7 days   Lab Units 02/16/25  1158   PROCALCITONIN ng/mL 0.11     Radiology(recent) CT Head Without Contrast    Result Date: 2/16/2025   No CT evidence for acute intracranial pathology.  The etiology of the patient's altered mental status is not further elucidated on this examination. If further radiographic assessment is warranted, one could obtain an MRI of the brain for follow-up.   Radiation dose reduction techniques were utilized, including automated exposure control and exposure modulation based on body size.  This report was finalized on 2/16/2025 1:10 PM by Dr. Gurinder Higgins M.D on Workstation: [id number]      XR Chest 1 View    Result Date: 2/16/2025   1. Low lung volumes with suspected subsegmental atelectasis at the bases. 2. Linear right upper lobe opacity, suspect subsegmental atelectasis or linear scarring. 3. Small hiatal hernia  This report was finalized on 2/16/2025 12:34 PM by Dr. Rowdy Rollins M.D on Workstation: [id number]     HS Troponin T   Date Value Ref Range Status   02/16/2025 45 (H) <22 ng/L Final   02/16/2025 46 (H) <22 ng/L Final     No components found for: \"TSH;2\"    budesonide-formoterol, 2 puff, Inhalation, BID - RT   " And  revefenacin, 175 mcg, Nebulization, Daily - RT  cefTRIAXone, 1,000 mg, Intravenous, Q24H  enoxaparin, 40 mg, Subcutaneous, Nightly  ferrous sulfate, 325 mg, Oral, Daily With Breakfast  FLUoxetine, 20 mg, Oral, Daily  furosemide, 20 mg, Oral, BID  gabapentin, 300 mg, Oral, TID  guaiFENesin, 600 mg, Oral, Q12H  ipratropium-albuterol, 3 mL, Nebulization, Q4H While Awake - RT  methylPREDNISolone sodium succinate, 60 mg, Intravenous, Q8H  multivitamin with minerals, 1 tablet, Oral, Daily  oseltamivir, 30 mg, Oral, Q12H  pantoprazole, 40 mg, Oral, Daily  potassium chloride ER, 40 mEq, Oral, Q4H  potassium chloride, 20 mEq, Oral, Daily  sucralfate, 1 g, Oral, 4x Daily       NPO Diet NPO Type: Strict NPO      Assessment & Plan      Active Hospital Problems    Diagnosis  POA    **Influenza A [J10.1]  Yes    Altered mental status [R41.82]  Unknown    COPD with acute exacerbation [J44.1]  Yes    Neuropathy [G62.9]  Yes    GERD (gastroesophageal reflux disease) [K21.9]  Yes      Resolved Hospital Problems   No resolved problems to display.     Patient is 84-year-old male with history of COPD, GERD, neuropathy who is a nursing home resident was sent to the hospital due to worsening confusion, decreased p.o. intake and dyspnea. He is on 2 L of oxygen that he normally uses at home and currently requiring 2 L. He was positive for the flu    Currently on nebulizers, steroids, tamiflu. He was also started on abx though seems more viral.   Seems to have failed initial RN dysphagia screen so ST to see  PT consultation  Continue chronic meds including lasix, PPI, Neurontin  Lovenox for dvt proph      Reviewed records  Dispo- likely back to SNF 2/18 or 2/19 depending how he is doing clinically     Nolan Scott MD  [facility]  02/17/25  11:37 EST

## 2025-02-17 NOTE — PLAN OF CARE
Goal Outcome Evaluation:  Plan of Care Reviewed With: patient           Outcome Evaluation: horace melendez/karina2-3, droplet precautions for fluA, 2l nc, plan of care ongoing.

## 2025-02-18 LAB
ANION GAP SERPL CALCULATED.3IONS-SCNC: 14 MMOL/L (ref 5–15)
BUN SERPL-MCNC: 26 MG/DL (ref 8–23)
BUN/CREAT SERPL: 26.5 (ref 7–25)
CALCIUM SPEC-SCNC: 8.6 MG/DL (ref 8.6–10.5)
CHLORIDE SERPL-SCNC: 103 MMOL/L (ref 98–107)
CO2 SERPL-SCNC: 24 MMOL/L (ref 22–29)
CREAT SERPL-MCNC: 0.98 MG/DL (ref 0.76–1.27)
DEPRECATED RDW RBC AUTO: 61 FL (ref 37–54)
EGFRCR SERPLBLD CKD-EPI 2021: 75.1 ML/MIN/1.73
ERYTHROCYTE [DISTWIDTH] IN BLOOD BY AUTOMATED COUNT: 19.8 % (ref 12.3–15.4)
GLUCOSE SERPL-MCNC: 130 MG/DL (ref 65–99)
HCT VFR BLD AUTO: 35.7 % (ref 37.5–51)
HGB BLD-MCNC: 12.3 G/DL (ref 13–17.7)
MAGNESIUM SERPL-MCNC: 2.3 MG/DL (ref 1.6–2.4)
MCH RBC QN AUTO: 29.9 PG (ref 26.6–33)
MCHC RBC AUTO-ENTMCNC: 34.5 G/DL (ref 31.5–35.7)
MCV RBC AUTO: 86.9 FL (ref 79–97)
PHOSPHATE SERPL-MCNC: 2.7 MG/DL (ref 2.5–4.5)
PLATELET # BLD AUTO: 183 10*3/MM3 (ref 140–450)
PMV BLD AUTO: 9.5 FL (ref 6–12)
POTASSIUM SERPL-SCNC: 3.9 MMOL/L (ref 3.5–5.2)
RBC # BLD AUTO: 4.11 10*6/MM3 (ref 4.14–5.8)
SODIUM SERPL-SCNC: 141 MMOL/L (ref 136–145)
WBC NRBC COR # BLD AUTO: 14.24 10*3/MM3 (ref 3.4–10.8)

## 2025-02-18 PROCEDURE — 94799 UNLISTED PULMONARY SVC/PX: CPT

## 2025-02-18 PROCEDURE — 25010000002 CEFTRIAXONE PER 250 MG: Performed by: INTERNAL MEDICINE

## 2025-02-18 PROCEDURE — 25010000002 ENOXAPARIN PER 10 MG: Performed by: INTERNAL MEDICINE

## 2025-02-18 PROCEDURE — 94664 DEMO&/EVAL PT USE INHALER: CPT

## 2025-02-18 PROCEDURE — 94761 N-INVAS EAR/PLS OXIMETRY MLT: CPT

## 2025-02-18 PROCEDURE — 84100 ASSAY OF PHOSPHORUS: CPT | Performed by: INTERNAL MEDICINE

## 2025-02-18 PROCEDURE — 63710000001 PREDNISONE PER 1 MG: Performed by: INTERNAL MEDICINE

## 2025-02-18 PROCEDURE — 83735 ASSAY OF MAGNESIUM: CPT | Performed by: INTERNAL MEDICINE

## 2025-02-18 PROCEDURE — 80048 BASIC METABOLIC PNL TOTAL CA: CPT | Performed by: INTERNAL MEDICINE

## 2025-02-18 PROCEDURE — 85027 COMPLETE CBC AUTOMATED: CPT | Performed by: INTERNAL MEDICINE

## 2025-02-18 RX ADMIN — ENOXAPARIN SODIUM 40 MG: 100 INJECTION SUBCUTANEOUS at 20:31

## 2025-02-18 RX ADMIN — BUDESONIDE AND FORMOTEROL FUMARATE DIHYDRATE 2 PUFF: 160; 4.5 AEROSOL RESPIRATORY (INHALATION) at 07:34

## 2025-02-18 RX ADMIN — FUROSEMIDE 20 MG: 20 TABLET ORAL at 08:38

## 2025-02-18 RX ADMIN — PREDNISONE 20 MG: 20 TABLET ORAL at 08:38

## 2025-02-18 RX ADMIN — GABAPENTIN 300 MG: 300 CAPSULE ORAL at 20:30

## 2025-02-18 RX ADMIN — OSELTAMIVIR PHOSPHATE 30 MG: 30 CAPSULE ORAL at 08:38

## 2025-02-18 RX ADMIN — FLUOXETINE 20 MG: 20 CAPSULE ORAL at 08:38

## 2025-02-18 RX ADMIN — IPRATROPIUM BROMIDE AND ALBUTEROL SULFATE 3 ML: 2.5; .5 SOLUTION RESPIRATORY (INHALATION) at 22:16

## 2025-02-18 RX ADMIN — FERROUS SULFATE TAB 325 MG (65 MG ELEMENTAL FE) 325 MG: 325 (65 FE) TAB at 08:37

## 2025-02-18 RX ADMIN — GUAIFENESIN 600 MG: 600 TABLET, MULTILAYER, EXTENDED RELEASE ORAL at 20:31

## 2025-02-18 RX ADMIN — SUCRALFATE 1 G: 1 TABLET ORAL at 08:38

## 2025-02-18 RX ADMIN — SUCRALFATE 1 G: 1 TABLET ORAL at 20:30

## 2025-02-18 RX ADMIN — IPRATROPIUM BROMIDE AND ALBUTEROL SULFATE 3 ML: 2.5; .5 SOLUTION RESPIRATORY (INHALATION) at 14:42

## 2025-02-18 RX ADMIN — GABAPENTIN 300 MG: 300 CAPSULE ORAL at 18:03

## 2025-02-18 RX ADMIN — PANTOPRAZOLE SODIUM 40 MG: 40 TABLET, DELAYED RELEASE ORAL at 08:38

## 2025-02-18 RX ADMIN — CEFTRIAXONE SODIUM 1000 MG: 1 INJECTION, POWDER, FOR SOLUTION INTRAMUSCULAR; INTRAVENOUS at 18:03

## 2025-02-18 RX ADMIN — BUDESONIDE AND FORMOTEROL FUMARATE DIHYDRATE 2 PUFF: 160; 4.5 AEROSOL RESPIRATORY (INHALATION) at 22:17

## 2025-02-18 RX ADMIN — SUCRALFATE 1 G: 1 TABLET ORAL at 12:13

## 2025-02-18 RX ADMIN — SUCRALFATE 1 G: 1 TABLET ORAL at 18:02

## 2025-02-18 RX ADMIN — FUROSEMIDE 20 MG: 20 TABLET ORAL at 20:30

## 2025-02-18 RX ADMIN — POTASSIUM CHLORIDE 20 MEQ: 1.5 POWDER, FOR SOLUTION ORAL at 08:38

## 2025-02-18 RX ADMIN — GABAPENTIN 300 MG: 300 CAPSULE ORAL at 08:38

## 2025-02-18 RX ADMIN — PREDNISONE 20 MG: 20 TABLET ORAL at 18:03

## 2025-02-18 RX ADMIN — Medication 1 TABLET: at 08:38

## 2025-02-18 RX ADMIN — GUAIFENESIN 600 MG: 600 TABLET, MULTILAYER, EXTENDED RELEASE ORAL at 08:38

## 2025-02-18 RX ADMIN — OSELTAMIVIR PHOSPHATE 30 MG: 30 CAPSULE ORAL at 20:30

## 2025-02-18 RX ADMIN — IPRATROPIUM BROMIDE AND ALBUTEROL SULFATE 3 ML: 2.5; .5 SOLUTION RESPIRATORY (INHALATION) at 07:34

## 2025-02-18 RX ADMIN — IPRATROPIUM BROMIDE AND ALBUTEROL SULFATE 3 ML: 2.5; .5 SOLUTION RESPIRATORY (INHALATION) at 11:15

## 2025-02-18 NOTE — PROGRESS NOTES
Name: Tony Leonard ADMIT: 2025   : 1940  PCP: Alfonso Goldstein MD    MRN: [id number] LOS: 0 days   AGE/SEX: 84 y.o. male  ROOM: [facility]   Subjective   Chief Complaint   Patient presents with    Fatigue    Weakness - Generalized     Patient is 84-year-old male with history of COPD, GERD, neuropathy who is a nursing home resident was sent to the hospital due to worsening confusion, decreased p.o. intake and dyspnea. He is on 2 L of oxygen that he normally uses at home and currently requiring 2 L. He was positive for the flu    Still with decent cough  Had CT scan  On oxygen and bronchodilators    ROS  No f/c  No n/v  No cp/palp  + soa/cough    Objective   Vital Signs  Temp:  [96.7 °F (35.9 °C)-98.2 °F (36.8 °C)] 96.7 °F (35.9 °C)  Heart Rate:  [] 75  Resp:  [18-20] 20  BP: (104-125)/(52-85) 125/66  SpO2:  [94 %-99 %] 99 %  on  Flow (L/min) (Oxygen Therapy):  [2] 2;   Device (Oxygen Therapy): nasal cannula  Body mass index is 27.18 kg/m².    Physical Exam  Constitutional:       General: He is in acute distress.      Appearance: He is ill-appearing.   HENT:      Head: Normocephalic and atraumatic.   Eyes:      General: No scleral icterus.  Cardiovascular:      Rate and Rhythm: Normal rate and regular rhythm.      Heart sounds: Normal heart sounds.   Pulmonary:      Effort: Pulmonary effort is normal. Respiratory distress present.      Breath sounds: Rhonchi present. No wheezing.   Abdominal:      General: There is no distension.      Palpations: Abdomen is soft.      Tenderness: There is no abdominal tenderness.   Musculoskeletal:      Cervical back: Neck supple.   Neurological:      Mental Status: He is alert.   Psychiatric:         Behavior: Behavior normal.     Some poor memory     Results Review:       I reviewed the patient's new clinical results.  Results from last 7 days   Lab Units 25  0510 25  0555 25  1158   WBC 10*3/mm3 14.24* 7.00 8.37   HEMOGLOBIN g/dL 12.3*  "12.4* 13.3   PLATELETS 10*3/mm3 183 193 188     Results from last 7 days   Lab Units 02/18/25  0510 02/17/25  1700 02/17/25  0555 02/16/25  1158   SODIUM mmol/L 141  --  138 139   POTASSIUM mmol/L 3.9 3.9 3.5 3.4*   CHLORIDE mmol/L 103  --  98 102   CO2 mmol/L 24.0  --  26.6 25.9   BUN mg/dL 26*  --  19 19   CREATININE mg/dL 0.98  --  0.82 1.04   GLUCOSE mg/dL 130*  --  144* 90   Estimated Creatinine Clearance: 67.7 mL/min (by C-G formula based on SCr of 0.98 mg/dL).  Results from last 7 days   Lab Units 02/16/25  1158   ALBUMIN g/dL 3.6   BILIRUBIN mg/dL 0.5   ALK PHOS U/L 55   AST (SGOT) U/L 26   ALT (SGPT) U/L 21     Results from last 7 days   Lab Units 02/18/25  0510 02/17/25  0555 02/16/25  1158   CALCIUM mg/dL 8.6 8.2* 8.4*   ALBUMIN g/dL  --   --  3.6   MAGNESIUM mg/dL 2.3  --   --    PHOSPHORUS mg/dL 2.7  --   --      Results from last 7 days   Lab Units 02/16/25  1158   PROCALCITONIN ng/mL 0.11   LACTATE mmol/L 1.9       Coag     HbA1C   Lab Results   Component Value Date    HGBA1C 5.39 04/04/2017     Infection   Results from last 7 days   Lab Units 02/16/25  1158   PROCALCITONIN ng/mL 0.11     Radiology(recent) No radiology results for the last day  HS Troponin T   Date Value Ref Range Status   02/16/2025 45 (H) <22 ng/L Final   02/16/2025 46 (H) <22 ng/L Final     No components found for: \"TSH;2\"    budesonide-formoterol, 2 puff, Inhalation, BID - RT   And  revefenacin, 175 mcg, Nebulization, Daily - RT  cefTRIAXone, 1,000 mg, Intravenous, Q24H  enoxaparin, 40 mg, Subcutaneous, Nightly  ferrous sulfate, 325 mg, Oral, Daily With Breakfast  FLUoxetine, 20 mg, Oral, Daily  furosemide, 20 mg, Oral, BID  gabapentin, 300 mg, Oral, TID  guaiFENesin, 600 mg, Oral, Q12H  ipratropium-albuterol, 3 mL, Nebulization, Q4H While Awake - RT  multivitamin with minerals, 1 tablet, Oral, Daily  oseltamivir, 30 mg, Oral, Q12H  pantoprazole, 40 mg, Oral, Daily  potassium chloride, 20 mEq, Oral, Daily  predniSONE, 20 mg, Oral, " BID With Meals  sucralfate, 1 g, Oral, 4x Daily       Diet: Regular/House; Texture: Soft to Chew (NDD 3); Soft to Chew: Chopped Meat; Fluid Consistency: Thin (IDDSI 0)      Assessment & Plan      Active Hospital Problems    Diagnosis  POA    **Influenza A [J10.1]  Yes    Altered mental status [R41.82]  Unknown    COPD with acute exacerbation [J44.1]  Yes    Neuropathy [G62.9]  Yes    GERD (gastroesophageal reflux disease) [K21.9]  Yes      Resolved Hospital Problems   No resolved problems to display.     Patient is 84-year-old male with history of COPD, GERD, neuropathy who is a nursing home resident was sent to the hospital due to worsening confusion, decreased p.o. intake and dyspnea. He is on 2 L of oxygen that he normally uses at home and currently requiring 2 L. He was positive for the flu    Currently on nebulizers, steroids, tamiflu. He was also started on abx though seems more viral. That being said he still sounds pretty poor on exam and chance could have a mild aspiration pneumonia so ill leave the abx for now  Modified diet as directed by ST. On PPI for refulx  PT consultation  Continue chronic meds including lasix, PPI, Neurontin  Lovenox for dvt proph      Reviewed records  Dispo- likely back to SNF 2/19 depending how he is doing clinically     Nolan Scott MD  [facility]  02/18/25  11:37 EST

## 2025-02-18 NOTE — PLAN OF CARE
Alert x 3, forgetful. Bed alarm active. Brief, wrap in place. Bedside commode for BM. Doppler pulses. IV S/L. Meds in applesauce. Barrier cream to coccyx and groin excoriation.       Problem: Adult Inpatient Plan of Care  Goal: Absence of Hospital-Acquired Illness or Injury  Intervention: Prevent Skin Injury  Recent Flowsheet Documentation  Taken 2/18/2025 0554 by Kathy Michelle RN  Body Position:   left   side-lying  Taken 2/18/2025 0355 by Kathy Michelle RN  Body Position: dangle, side of bed  Taken 2/18/2025 0145 by Kathy Michelle RN  Body Position:   turned   right   heels elevated  Skin Protection:   incontinence pads utilized   protective footwear used   pulse oximeter probe site changed  Taken 2/18/2025 0016 by Kathy Michelle RN  Body Position:   turned   left   heels elevated  Taken 2/17/2025 2347 by Kathy Michelle RN  Body Position: heels elevated  Taken 2/17/2025 2040 by Kathy Michelle RN  Skin Protection: incontinence pads utilized   Goal Outcome Evaluation:

## 2025-02-18 NOTE — DISCHARGE PLACEMENT REQUEST
"Tony Casey (84 y.o. Male)       Date of Birth   1940    Social Security Number       Address   [address] [place] [address]    Home Phone   [phone]    MRN   [id number]       Judaism   Faith    Marital Status   Single                            Admission Date   2/16/25    Admission Type   Emergency    Admitting Provider   Cedrick Chau MD    Attending Provider   Nolan Scott MD    Department, Room/Bed   [address], [address]/1       Discharge Date       Discharge Disposition       Discharge Destination                                 Attending Provider: Nolan Scott MD    Allergies: Daliresp [Roflumilast], Latex, Sulfa Antibiotics    Isolation: Droplet   Infection: Influenza (02/16/25)   Code Status: CPR    Ht: 180.3 cm (71\")   Wt: 88.4 kg (194 lb 14.2 oz)    Admission Cmt: None   Principal Problem: Influenza A [J10.1]                   Active Insurance as of 2/16/2025       Primary Coverage       Payor Plan Insurance Group Employer/Plan Group    MEDICARE MEDICARE A & B        Payor Plan Address Payor Plan Phone Number Payor Plan Fax Number Effective Dates    [address] [phone]  5/1/2003 - None Entered    [facility] [id number]         Subscriber Name Subscriber Birth Date Member ID       TONY CASEY 1940 [id number]               Secondary Coverage       Payor Plan Insurance Group Employer/Plan Group     FOR LIFE  FOR LIFE MC [id number]         Payor Plan Address Payor Plan Phone Number Payor Plan Fax Number Effective Dates    [address] [phone]  3/10/2016 - None Entered    [facility] [address]         Subscriber Name Subscriber Birth Date Member ID       TONY CASEY 1940 [id number]                     Emergency Contacts        (Rel.) Home Phone Work Phone Mobile Phone    CARMELA,EDWILLIAM (Son) [phone] -- [phone]    Frederick Casey (Son) -- -- " [phone]    Ricardo Leonard (Son) [phone] -- [phone]

## 2025-02-18 NOTE — CASE MANAGEMENT/SOCIAL WORK
Discharge Planning Assessment  [facility]     Patient Name: Tony Leonard  MRN: [id number]  Today's Date: 2/18/2025    Admit Date: 2/16/2025    Plan: [facility]. Medicaid bed hold. Can return when medically ready   Discharge Needs Assessment       Row Name 02/18/25 1154       Living Environment    People in Home facility resident    Current Living Arrangements extended care facility    Potentially Unsafe Housing Conditions none    Primary Care Provided by self    Provides Primary Care For no one    Family Caregiver if Needed child(brian), adult    Quality of Family Relationships helpful;involved;supportive    Able to Return to Prior Arrangements yes       Resource/Environmental Concerns    Resource/Environmental Concerns none    Transportation Concerns none       Transition Planning    Patient/Family Anticipates Transition to long-term care facility    Patient/Family Anticipated Services at Transition none    Transportation Anticipated health plan transportation       Discharge Needs Assessment    Readmission Within the Last 30 Days no previous admission in last 30 days    Equipment Currently Used at Home wheelchair    Anticipated Changes Related to Illness none    Equipment Needed After Discharge none    Provided Post Acute Provider List? N/A    Provided Post Acute Provider Quality & Resource List? N/A                   Discharge Plan       Row Name 02/18/25 1155       Plan    Plan [facility]. Medicaid bed hold. Can return when medically ready    Patient/Family in Agreement with Plan yes    Plan Comments CCP contacted the patients son Stef. Introduced self and explained role of CCP. Patients son confirmed the information on his face sheet. Patient is from [facility]. Per Pavithra he is a [facility] Medicaid bed hold and can return at discharge. Patient will need transport. Pharmacy is correct.Patient uses a walker or a wheelchair for mobility. CCP following.                  Continued Care and  Services - Admitted Since 2/16/2025       Destination       Service Provider Request Status Services Address Phone Fax Patient Preferred    [facility] AC[facility] Accepted -- [address] [facility] [address] [phone] --                  Selected Continued Care - Prior Encounters Includes continued care and service providers with selected services from prior encounters from 11/18/2024 to 2/18/2025      Discharged on 11/27/2024 Admission date: 11/25/2024 - Discharge disposition: Intermediate Care       Destination       Service Provider Services Address Phone Fax Patient Preferred    [facility] [address] [facility] [address] [phone]                           Expected Discharge Date and Time       Expected Discharge Date Expected Discharge Time    Feb 19, 2025            Demographic Summary       Row Name 02/18/25 1153       General Information    Admission Type observation    Arrived From home    Referral Source admission list    Reason for Consult discharge planning    Preferred Language English                   Functional Status       Row Name 02/18/25 1154       Functional Status    Usual Activity Tolerance moderate    Current Activity Tolerance moderate       Functional Status, IADL    Medications assistive person    Meal Preparation assistive person    Housekeeping assistive person    Laundry assistive person    Shopping assistive person       Mental Status    General Appearance WDL WDL       Mental Status Summary    Recent Changes in Mental Status/Cognitive Functioning no changes       Employment/    Employment Status retired                   Psychosocial    No documentation.                  Abuse/Neglect    No documentation.                  Legal    No documentation.                  Substance Abuse    No documentation.                  Patient Forms    No documentation.

## 2025-02-18 NOTE — PLAN OF CARE
Goal Outcome Evaluation:           Progress: improving  Outcome Evaluation: Monitor pain,labs,and vitals. VSS. IV ABX. No c/o pain on current shift. Will continue to monitor.

## 2025-02-19 ENCOUNTER — APPOINTMENT (OUTPATIENT)
Dept: GENERAL RADIOLOGY | Facility: HOSPITAL | Age: 85
End: 2025-02-19
Payer: MEDICARE

## 2025-02-19 VITALS
SYSTOLIC BLOOD PRESSURE: 113 MMHG | OXYGEN SATURATION: 94 % | RESPIRATION RATE: 18 BRPM | DIASTOLIC BLOOD PRESSURE: 95 MMHG | TEMPERATURE: 98.2 F | HEART RATE: 89 BPM | BODY MASS INDEX: 22.6 KG/M2 | WEIGHT: 162.04 LBS

## 2025-02-19 LAB
ANION GAP SERPL CALCULATED.3IONS-SCNC: 16.7 MMOL/L (ref 5–15)
BUN SERPL-MCNC: 25 MG/DL (ref 8–23)
BUN/CREAT SERPL: 25.3 (ref 7–25)
CALCIUM SPEC-SCNC: 8.7 MG/DL (ref 8.6–10.5)
CHLORIDE SERPL-SCNC: 99 MMOL/L (ref 98–107)
CO2 SERPL-SCNC: 27.3 MMOL/L (ref 22–29)
CREAT SERPL-MCNC: 0.99 MG/DL (ref 0.76–1.27)
DEPRECATED RDW RBC AUTO: 62 FL (ref 37–54)
EGFRCR SERPLBLD CKD-EPI 2021: 74.2 ML/MIN/1.73
ERYTHROCYTE [DISTWIDTH] IN BLOOD BY AUTOMATED COUNT: 19.6 % (ref 12.3–15.4)
GLUCOSE SERPL-MCNC: 139 MG/DL (ref 65–99)
HCT VFR BLD AUTO: 40.9 % (ref 37.5–51)
HGB BLD-MCNC: 13.9 G/DL (ref 13–17.7)
MCH RBC QN AUTO: 30.2 PG (ref 26.6–33)
MCHC RBC AUTO-ENTMCNC: 34 G/DL (ref 31.5–35.7)
MCV RBC AUTO: 88.7 FL (ref 79–97)
PLATELET # BLD AUTO: 226 10*3/MM3 (ref 140–450)
PMV BLD AUTO: 9.2 FL (ref 6–12)
POTASSIUM SERPL-SCNC: 3 MMOL/L (ref 3.5–5.2)
RBC # BLD AUTO: 4.61 10*6/MM3 (ref 4.14–5.8)
SODIUM SERPL-SCNC: 143 MMOL/L (ref 136–145)
WBC NRBC COR # BLD AUTO: 15.25 10*3/MM3 (ref 3.4–10.8)

## 2025-02-19 PROCEDURE — 94761 N-INVAS EAR/PLS OXIMETRY MLT: CPT

## 2025-02-19 PROCEDURE — 80048 BASIC METABOLIC PNL TOTAL CA: CPT | Performed by: INTERNAL MEDICINE

## 2025-02-19 PROCEDURE — 85027 COMPLETE CBC AUTOMATED: CPT | Performed by: INTERNAL MEDICINE

## 2025-02-19 PROCEDURE — 94760 N-INVAS EAR/PLS OXIMETRY 1: CPT

## 2025-02-19 PROCEDURE — 97530 THERAPEUTIC ACTIVITIES: CPT

## 2025-02-19 PROCEDURE — 73502 X-RAY EXAM HIP UNI 2-3 VIEWS: CPT

## 2025-02-19 PROCEDURE — 94799 UNLISTED PULMONARY SVC/PX: CPT

## 2025-02-19 PROCEDURE — 63710000001 PREDNISONE PER 1 MG: Performed by: INTERNAL MEDICINE

## 2025-02-19 PROCEDURE — 94664 DEMO&/EVAL PT USE INHALER: CPT

## 2025-02-19 PROCEDURE — 63710000001 REVEFENACIN 175 MCG/3ML SOLUTION: Performed by: INTERNAL MEDICINE

## 2025-02-19 RX ORDER — CEFDINIR 300 MG/1
300 CAPSULE ORAL 2 TIMES DAILY
Qty: 4 CAPSULE | Refills: 0 | Status: SHIPPED | OUTPATIENT
Start: 2025-02-19 | End: 2025-02-21

## 2025-02-19 RX ORDER — POTASSIUM CHLORIDE 1500 MG/1
40 TABLET, EXTENDED RELEASE ORAL EVERY 4 HOURS
Status: DISCONTINUED | OUTPATIENT
Start: 2025-02-19 | End: 2025-02-19 | Stop reason: HOSPADM

## 2025-02-19 RX ORDER — OSELTAMIVIR PHOSPHATE 30 MG/1
30 CAPSULE ORAL EVERY 12 HOURS SCHEDULED
Qty: 4 CAPSULE | Refills: 0 | Status: SHIPPED | OUTPATIENT
Start: 2025-02-19 | End: 2025-02-21

## 2025-02-19 RX ORDER — PREDNISONE 20 MG/1
20 TABLET ORAL 2 TIMES DAILY
Qty: 10 TABLET | Refills: 0 | Status: SHIPPED | OUTPATIENT
Start: 2025-02-19 | End: 2025-02-24

## 2025-02-19 RX ADMIN — PREDNISONE 20 MG: 20 TABLET ORAL at 09:36

## 2025-02-19 RX ADMIN — POTASSIUM CHLORIDE 20 MEQ: 1.5 POWDER, FOR SOLUTION ORAL at 09:36

## 2025-02-19 RX ADMIN — IPRATROPIUM BROMIDE AND ALBUTEROL SULFATE 3 ML: 2.5; .5 SOLUTION RESPIRATORY (INHALATION) at 11:17

## 2025-02-19 RX ADMIN — REVEFENACIN 175 MCG: 175 SOLUTION RESPIRATORY (INHALATION) at 08:23

## 2025-02-19 RX ADMIN — GABAPENTIN 300 MG: 300 CAPSULE ORAL at 09:36

## 2025-02-19 RX ADMIN — Medication 1 TABLET: at 09:35

## 2025-02-19 RX ADMIN — OSELTAMIVIR PHOSPHATE 30 MG: 30 CAPSULE ORAL at 09:35

## 2025-02-19 RX ADMIN — FUROSEMIDE 20 MG: 20 TABLET ORAL at 09:35

## 2025-02-19 RX ADMIN — SUCRALFATE 1 G: 1 TABLET ORAL at 11:45

## 2025-02-19 RX ADMIN — FERROUS SULFATE TAB 325 MG (65 MG ELEMENTAL FE) 325 MG: 325 (65 FE) TAB at 09:35

## 2025-02-19 RX ADMIN — IPRATROPIUM BROMIDE AND ALBUTEROL SULFATE 3 ML: 2.5; .5 SOLUTION RESPIRATORY (INHALATION) at 08:22

## 2025-02-19 RX ADMIN — PANTOPRAZOLE SODIUM 40 MG: 40 TABLET, DELAYED RELEASE ORAL at 09:36

## 2025-02-19 RX ADMIN — SUCRALFATE 1 G: 1 TABLET ORAL at 09:35

## 2025-02-19 RX ADMIN — FLUOXETINE 20 MG: 20 CAPSULE ORAL at 09:36

## 2025-02-19 RX ADMIN — GUAIFENESIN 600 MG: 600 TABLET, MULTILAYER, EXTENDED RELEASE ORAL at 09:35

## 2025-02-19 RX ADMIN — BUDESONIDE AND FORMOTEROL FUMARATE DIHYDRATE 2 PUFF: 160; 4.5 AEROSOL RESPIRATORY (INHALATION) at 08:23

## 2025-02-19 RX ADMIN — POTASSIUM CHLORIDE 40 MEQ: 1500 TABLET, EXTENDED RELEASE ORAL at 11:45

## 2025-02-19 NOTE — DISCHARGE SUMMARY
Date of Admission: 2/16/2025  Date of Discharge:  2/19/2025  Primary Care Physician: Alfonso Goldstein MD     Discharge Diagnosis:  Active Hospital Problems    Diagnosis  POA    **Influenza A [J10.1]  Yes    Altered mental status [R41.82]  Yes    COPD with acute exacerbation [J44.1]  Yes    Chronic respiratory failure with hypoxia [J96.11]  Yes    Neuropathy [G62.9]  Yes    GERD (gastroesophageal reflux disease) [K21.9]  Yes      Resolved Hospital Problems   No resolved problems to display.       Presenting Problem/History of Present Illness from H&P:  Influenza A [J10.1]     Patient is 84-year-old male with history of COPD, GERD, neuropathy who is a nursing home resident was sent to the hospital due to worsening confusion, decreased p.o. intake and dyspnea.  He is on 2 L of oxygen that he normally uses at home and currently requiring 2 L.  Underwent routine workup including chest x-ray which revealed right upper lobe opacity and lactate was 1.9.  Urinalysis with no evidence of UTI.  Tested positive for flu.  No reports of any fevers or chills.  At the time of my evaluation patient is oriented to person and place and unable to provide a good history.  Most of the information was obtained from the ER provider and chart review.  He denies any fevers, chills, runny nose, sore throat.  [facility],    [facility] Course:  The patient is a 84 y.o. male who presented with influenza A, pneumonia, COPD exacerbation in the setting of chronic hypoxic respiratory failure.  He was admitted and given Tamiflu steroids nebulizers and antibiotics.  He has had improvement in his shortness of breath and is on his home 2 L now.  Will transition to Omnicef to complete his oral antibiotic course.  Continue the increased dose of prednisone to complete a course and then he can resume his home chronic prednisone dose.  He should follow-up with his home providers.  He is also going to complete a Tamiflu course.      Exam Today:  General  AA NAD  HEENT NCAT  CV RRR  Lung decreased breath sounds with rhonchi bilaterally and prolonged expiration  Abdomen ND NT  Extremity no cyanosis or pitting  Neuro CN II through XII grossly intact  Psych normal mood and affect    Results:  CXR  1. Low lung volumes with suspected subsegmental atelectasis at the  bases.  2. Linear right upper lobe opacity, suspect subsegmental atelectasis or  linear scarring.  3. Small hiatal hernia    CT Head  No CT evidence for acute intracranial pathology.  The etiology of the  patient's altered mental status is not further elucidated on this  examination. If further radiographic assessment is warranted, one could  obtain an MRI of the brain for follow-up.    CT Chest  1. There is mild bronchial thickening at both lower lobes and the medial  right lower lobe bronchus is partially opacified, image 61. There are  reticular nodular opacities at the lingula which were not definitively  present previously and may represent acute bronchiolitis. There are no  dense consolidations or definitive pneumonia, but there is a tiny left  pleural effusion. There is a new tiny pericardial effusion as well.     2. The entire esophagus is at least mildly dilated with fluid and there  is a small volume of fluid within the stomach. There is presumably  gastroesophageal reflux and aspiration precautions are recommended.     3. There is stable 4.2 cm dilatation of the ascending thoracic aorta. A  few coronary artery calcifications are noted.    Procedures Performed:         Consults:   Consults       Date and Time Order Name Status Description    2/16/2025  1:26 PM [facility] (on-call MD unless specified) Details               Discharge Disposition:  Long Term Care (DC - External)    Discharge Medications:     Discharge Medications        New Medications        Instructions Start Date   cefdinir 300 MG capsule  Commonly known as: OMNICEF   300 mg, Oral, 2 Times Daily      oseltamivir 30 MG capsule  Commonly  known as: TAMIFLU   30 mg, Oral, Every 12 Hours Scheduled             Changes to Medications        Instructions Start Date   predniSONE 20 MG tablet  Commonly known as: DELTASONE  What changed:   when to take this  additional instructions   20 mg, Oral, 2 Times Daily, Then resume taking home chronic prednisone dose daily.             Continue These Medications        Instructions Start Date   acetaminophen 325 MG tablet  Commonly known as: TYLENOL   650 mg, Oral, Every 4 Hours PRN      albuterol (2.5 MG/3ML) 0.083% nebulizer solution  Commonly known as: PROVENTIL   2.5 mg, Nebulization, Every 6 Hours PRN      Aplisol 5 UNIT/0.1ML injection  Generic drug: tuberculin   5 Units, Once      azithromycin 250 MG tablet  Commonly known as: ZITHROMAX   Indications: Worsening of Chronic Obstructive Lung Disease      calcium carbonate 500 MG chewable tablet  Commonly known as: TUMS   2 tablets, Every 4 Hours PRN      dicyclomine 10 MG capsule  Commonly known as: BENTYL   10 mg, Oral, 3 Times Daily PRN      dimethicone 1.3 % lotion lotion  Commonly known as: AVEENO   1 Application, Every 12 Hours PRN      ferrous sulfate 325 (65 FE) MG tablet   325 mg, Daily With Breakfast      FLUoxetine 20 MG capsule  Commonly known as: PROzac   TAKE 1 CAPSULE DAILY      furosemide 20 MG tablet  Commonly known as: LASIX   20 mg, 2 Times Daily      gabapentin 300 MG capsule  Commonly known as: NEURONTIN   300 mg, Oral, 3 Times Daily      guaiFENesin 600 MG 12 hr tablet  Commonly known as: MUCINEX   1,200 mg, 2 Times Daily      Melatonin 3 MG capsule   3 mg, As Needed      menthol-zinc oxide 0.44-20.625 % ointment ointment  Commonly known as: CALMOSEPTINE   1 Application, Every 12 Hours Scheduled      Multivitamin Adult tablet tablet  Generic drug: multivitamin with minerals   1 tablet, Oral, Daily      ondansetron ODT 4 MG disintegrating tablet  Commonly known as: ZOFRAN-ODT   4 mg, Translingual, Every 6 Hours PRN      pantoprazole 40 MG EC  tablet  Commonly known as: PROTONIX   40 mg, Oral, Daily      potassium chloride 20 MEQ packet  Commonly known as: KLOR-CON   20 mEq, Daily      sodium chloride 7 % nebulizer solution nebulizer solution   4 mL, Nebulization, Every 4 Hours PRN      sucralfate 1 g tablet  Commonly known as: Carafate   1 g, Oral, 4 Times Daily      [employer] Back [employer] misc   1 Pad, Daily      tiZANidine 2 MG tablet  Commonly known as: ZANAFLEX   2 mg, Oral, Daily PRN      Trelegy Ellipta 100-62.5-25 MCG/ACT inhaler  Generic drug: Fluticasone-Umeclidin-Vilant   1 puff, Inhalation, Daily - RT               Discharge Diet:   Diet Instructions       Diet: Regular/House Diet; Soft to Chew (NDD 3); Chopped Meat; Thin (IDDSI 0)      Discharge Diet: Regular/House Diet    Texture: Soft to Chew (NDD 3)    Soft to Chew: Chopped Meat    Fluid Consistency: Thin (IDDSI 0)            Activity at Discharge:   Activity Instructions       Activity as Tolerated              Follow-up Appointments:   Contact information for follow-up providers       Alfonso Goldstein MD Follow up.    Specialty: Family Medicine  Contact information:  [address] [place] [id number] [phone]                       Contact information for after-discharge care       Destination       [facility] .    Service: Nursing Home  Contact information:  [facility] [address] [facility] [address] [phone]                                   Test Results Pending at Discharge:       Jorje Mcdermott MD  02/19/25  11:29 EST    Time Spent on Discharge Activities: >30 minutes    Dictated portions using Dragon dictation software.

## 2025-02-19 NOTE — PLAN OF CARE
Goal Outcome Evaluation:  Plan of Care Reviewed With: patient        Progress: improving  Outcome Evaluation: VSS, room air, No c/o pain or s/s of distress. NSR PVCs, PACs on monitor. Voiding mainly via urinal with brief for sporadic incontinent episodes. Remains in droplet isolation. Plan of care ongoing.

## 2025-02-19 NOTE — CASE MANAGEMENT/SOCIAL WORK
Continued Stay Note  [facility]     Patient Name: Tony Leonard  MRN: [id number]  Today's Date: 2/19/2025    Admit Date: 2/16/2025    Plan: [facility]   Discharge Plan       Row Name 02/19/25 1211       Plan    Plan [facility]    Patient/Family in Agreement with Plan yes    Plan Comments Plan remains for pt to return home to [facility] at discharge. He will need EMS transportation. Discharge order noted. Called Pavithra/ [facility] and she confirmed the pt can return at any time. Called [facility] but we do not have transportation available at this time. Called [facility] and scheduled 2:30pm EMS transportation today. Updated primary RN and MD. Discharge summary faxed to facility. Packet and EMS form complete and given to primary RN. No further needs assessed at this time. CCP following. Vicente FONTAINE RN                   Discharge Codes    No documentation.                 Expected Discharge Date and Time       Expected Discharge Date Expected Discharge Time    Feb 19, 2025               Vicente Varghese RN

## 2025-02-19 NOTE — THERAPY TREATMENT NOTE
Patient Name: Tony Leonard  : 1940    MRN: [id number]                              Today's Date: 2025       Admit Date: 2025    Visit Dx:     ICD-10-CM ICD-9-CM   1. Influenza A  J10.1 487.1     Patient Active Problem List   Diagnosis    Thrush, oral    ADD (attention deficit disorder)    Hemorrhoids    Bronchiectasis with acute lower respiratory infection    Diverticul disease small and large intestine, no perforati or abscess    Benign non-nodular prostatic hyperplasia without lower urinary tract symptoms    Gastroesophageal reflux disease    Malaise and fatigue    GERD (gastroesophageal reflux disease)    Environmental allergies    Hemoptysis    Dyslipidemia    Primary osteoarthritis involving multiple joints    Pneumonia of right lower lobe due to infectious organism    Abnormal EKG    COPD (chronic obstructive pulmonary disease)    Mild malnutrition    Acute on chronic respiratory failure with hypoxia    Fracture, intertrochanteric, right femur, closed, initial encounter    Chronic diastolic CHF (congestive heart failure)    Hematoma of frontal scalp    Postoperative anemia due to acute blood loss    Urinary retention    Hemorrhagic disorder due to circulating anticoagulants    History of fracture of right hip    Closed fracture of right hip with routine healing    Chronic low back pain with sciatica    Change in bowel function    Rectal bleeding    Prostate cancer    On home oxygen therapy    Acute viral bronchitis    Neuropathy    Plantar fasciitis    HTN (hypertension)    Bilateral lower extremity edema    Microscopic hematuria    Acute midline low back pain with right-sided sciatica    Spinal stenosis, lumbar region with neurogenic claudication    Compression deformity of vertebra    Rectal bleed    Esophagitis    Angiectasia    Hiatal hernia    Overweight (BMI 25.0-29.9)    Leukocytosis    Bilateral hip pain    Elevated troponin level not due myocardial infarction    Nocturnal hypoxia     Pneumonia of right upper lobe due to infectious organism    NSTEMI (non-ST elevated myocardial infarction)    Chronic respiratory failure with hypoxia    Paroxysmal atrial fibrillation    Acute exacerbation of chronic obstructive pulmonary disease (COPD)    Anemia    Non-compliant patient    Hypokalemia    Spondylolisthesis of lumbar region    Elevated troponin    Rhabdomyolysis    Multiple contusions    Fall    Dizziness    Contusion of hip    Pulmonary embolism    COPD with acute exacerbation    Influenza A    Altered mental status     Past Medical History:   Diagnosis Date    ADHD (attention deficit hyperactivity disorder)     Bronchiectasis     CHF (congestive heart failure)     Colon polyp     COPD (chronic obstructive pulmonary disease)     Diverticulosis     Hard of hearing     On home oxygen therapy     2 LITERS    Pneumonia     Prostate cancer 04/22/2019    Spinal stenosis, lumbar region with neurogenic claudication 08/02/2022     Past Surgical History:   Procedure Laterality Date    BRONCHOSCOPY N/A 04/30/2016    Procedure: BRONCHOSCOPY with BAL of right lower lobe and left  lower lobe.  ;  Surgeon: Nando Diaz MD;  Location: [facility] ENDOSCOPY;  Service:     BRONCHOSCOPY N/A 04/28/2017    Procedure: BRONCHOSCOPY;  Surgeon: Katharina Salter MD;  Location: [facility] ENDOSCOPY;  Service:     BRONCHOSCOPY Bilateral 06/29/2017    Procedure: BRONCHOSCOPY WITH WASHINGS;  Surgeon: Katharina Salter MD;  Location: [facility] ENDOSCOPY;  Service:     BRONCHOSCOPY N/A 01/22/2018    Procedure: BRONCHOSCOPY AT BEDSIDE with BAL;  Surgeon: Katharina Salter MD;  Location: [facility] ENDOSCOPY;  Service:     BRONCHOSCOPY N/A 01/30/2018    Procedure: BRONCHOSCOPY with BAL and washing;  Surgeon: Shreyas Wild MD;  Location: [facility] ENDOSCOPY;  Service:     BRONCHOSCOPY Bilateral 04/24/2018    Procedure: BRONCHOSCOPY WITH WASHING;  Surgeon: Katharina Salter MD;  Location: [facility] ENDOSCOPY;  Service: Pulmonary    BRONCHOSCOPY N/A  12/28/2019    Procedure: BRONCHOSCOPY with bilateral washing;  Surgeon: Katharina Salter MD;  [facility];  Service: Pulmonary    BRONCHOSCOPY Bilateral 01/04/2023    Procedure: BRONCHOSCOPY with lavage;  Surgeon: Katharina Salter MD;  [facility];  Service: Pulmonary;  Laterality: Bilateral;  mucus plugging    CARDIAC CATHETERIZATION N/A 01/29/2023    Procedure: Left Heart Cath;  Surgeon: Johnnie Ang MD;  [facility];  Service: Cardiology;  Laterality: N/A;    CARDIAC CATHETERIZATION N/A 01/29/2023    Procedure: Coronary angiography;  Surgeon: Johnnie Ang MD;  [facility];  Service: Cardiology;  Laterality: N/A;    COLONOSCOPY  05/17/2013    eh, ih, tort, sig tics    COLONOSCOPY N/A 05/10/2019    Non-thrombosed external hemorrhoids found on perianal exam, Diverticulosis, Tortuous colon, One 5 mm polyp in the mid ascending colon, IH. Path: Tubular adenoma.     COLONOSCOPY N/A 09/24/2022    Procedure: COLONOSCOPY to cecum and TI with argon plasma coagulation.;  Surgeon: Bruce Black MD;  Location: [facility] ENDOSCOPY;  Service: Gastroenterology;  Laterality: N/A;  pre- GI bleeding  post- radiation proctitis, diverticulosis    ENDOSCOPY  03/19/2015    z line irreg, hh    ENDOSCOPY N/A 09/24/2022    Procedure: ESOPHAGOGASTRODUODENOSCOPY;  Surgeon: Bruce Black MD;  Location: [facility] ENDOSCOPY;  Service: Gastroenterology;  Laterality: N/A;  pre- GI bleeding  post- esophagitis, hiatal hernia    HIP OPEN REDUCTION Right 01/17/2018    Procedure: HIP OPEN REDUCTION INTERNAL FIXATION WITH DYNAMIC HIP SCREW;  Surgeon: Les Black MD;  Location: [facility] MAIN OR;  Service:     SPINE SURGERY      TONSILLECTOMY      TOTAL HIP ARTHROPLASTY REVISION Right 09/23/2019    Procedure: HIP  REVISION RIGHT;  Surgeon: Isauro Warner MD;  Location: [facility] MAIN OR;  Service: Orthopedics      General Information       Row Name  02/19/25 1211          Physical Therapy Time and Intention    Document Type therapy note (daily note)  -     Mode of Treatment individual therapy;physical therapy  -       Row Name 02/19/25 1211          General Information    Patient Profile Reviewed yes  -     Existing Precautions/Restrictions fall  -       Row Name 02/19/25 1211          Cognition    Orientation Status (Cognition) oriented x 4  -       Row Name 02/19/25 1211          Safety Issues/Impairments Affecting Functional Mobility    Impairments Affecting Function (Mobility) balance;endurance/activity tolerance;strength;pain;range of motion (ROM)  -               User Key  (r) = Recorded By, (t) = Taken By, (c) = Cosigned By      Initials Name Provider Type     Diana Morales PT Physical Therapist                   Mobility       Row Name 02/19/25 1212          Bed Mobility    Bed Mobility supine-sit  -     Supine-Sit [place] (Bed Mobility) verbal cues;contact guard  -     Assistive Device (Bed Mobility) head of bed elevated;bed rails  -       Row Name 02/19/25 1212          Bed-Chair Transfer    Bed-Chair [place] (Transfers) minimum assist (75% patient effort);1 person assist;verbal cues  -     Assistive Device (Bed-Chair Transfers) walker, front-wheeled  -     Comment, (Bed-Chair Transfer) Bed<>BSC  -       Row Name 02/19/25 1212          Sit-Stand Transfer    Sit-Stand [place] (Transfers) 1 person assist;verbal cues;minimum assist (75% patient effort)  -     Assistive Device (Sit-Stand Transfers) walker, front-wheeled  -               User Key  (r) = Recorded By, (t) = Taken By, (c) = Cosigned By      Initials Name Provider Type     Diana Morales PT Physical Therapist                   Obj/Interventions    No documentation.                  Goals/Plan    No documentation.                  Clinical Impression       Row Name 02/19/25 1213          Pain    Pretreatment Pain Rating 0/10 - no pain  -   "   Posttreatment Pain Rating 0/10 - no pain  -       Row Name 02/19/25 1213          Plan of Care Review    Plan of Care Reviewed With patient  -     Progress improving  -     Outcome Evaluation Pt seen for PT this AM and tolerated mobility well. Pt transferred to EOB with CGA and stood with min A x1 and rwx. Pt reporting needing to have a BM upon standing, transferred to [facility] with min A x1 and demo'd controlled lower to seat. PT waited for a couple minutes, but then pt reporting he needed to \"sit for a while.\" Gave pt his call bell and notified aid/RN. Encouraged pt to transfer to chair following toileting. PT will continue to follow peripherally, anticipate return to LTC at [place].  -       Row Name 02/19/25 1213          Therapy Assessment/Plan (PT)    Therapy Frequency (PT) 3 times/wk  -       Row Name 02/19/25 1213          Vital Signs    O2 Delivery Pre Treatment supplemental O2  -     O2 Delivery Intra Treatment supplemental O2  -     O2 Delivery Post Treatment supplemental O2  -       Row Name 02/19/25 1213          Positioning and Restraints    Pre-Treatment Position in bed  -     Post Treatment Position [facility]  -     On BS commode notified nsg;sitting;call light within reach;encouraged to call for assist  -               User Key  (r) = Recorded By, (t) = Taken By, (c) = Cosigned By      Initials Name Provider Type     Diana Morales, PT Physical Therapist                   Outcome Measures       Row Name 02/19/25 1312 02/19/25 0905       How much help from another person do you currently need...    Turning from your back to your side while in flat bed without using bedrails? 3  - 3  -WG    Moving from lying on back to sitting on the side of a flat bed without bedrails? 3  - 3  -WG    Moving to and from a bed to a chair (including a wheelchair)? 3  - 3  -WG    Standing up from a chair using your arms (e.g., wheelchair, bedside chair)? 3  - 2  -WG    Climbing 3-5 steps with a " railing? 1  - 1  -    To walk in hospital room? 2  - 2  -    AM-PAC 6 Clicks Score (PT) 15  - 14  -    Highest Level of Mobility Goal 4 --> Transfer to chair/commode  - 4 --> Transfer to chair/commode  -      Row Name 02/19/25 1312          Functional Assessment    Outcome Measure Options AM-PAC 6 Clicks Basic Mobility (PT)  -               User Key  (r) = Recorded By, (t) = Taken By, (c) = Cosigned By      Initials Name Provider Type     Diana Morales, PT Physical Therapist     Abimael White, RN Registered Nurse                                 Physical Therapy Education       Title: PT OT SLP Therapies (Resolved)       Topic: Physical Therapy (Resolved)       Point: Mobility training (Resolved)       Learning Progress Summary            Patient Acceptance, E, VU by  at 2/17/2025 1622    Acceptance, D,E,TB, VU,NR by  at 2/17/2025 0941                      Point: Home exercise program (Resolved)       Learning Progress Summary            Patient Acceptance, E, VU by  at 2/17/2025 1622    Acceptance, D,E,TB, VU,NR by  at 2/17/2025 0941                      Point: Body mechanics (Resolved)       Learning Progress Summary            Patient Acceptance, E, VU by  at 2/17/2025 1622    Acceptance, D,E,TB, VU,NR by  at 2/17/2025 0941                      Point: Precautions (Resolved)       Learning Progress Summary            Patient Acceptance, E, VU by  at 2/17/2025 1622    Acceptance, D,E,TB, VU,NR by  at 2/17/2025 0941                                      User Key       Initials Effective Dates Name Provider Type Discipline     04/08/22 -  Diana Morales PT Physical Therapist PT     06/12/24 -  Abimael White, RN Registered Nurse Nurse                  PT Recommendation and Plan  Planned Therapy Interventions (PT): balance training, bed mobility training, gait training, home exercise program, patient/family education, ROM (range of motion), strengthening, stretching,  "transfer training  Progress: improving  Outcome Evaluation: Pt seen for PT this AM and tolerated mobility well. Pt transferred to EOB with CGA and stood with min A x1 and rwx. Pt reporting needing to have a BM upon standing, transferred to BSC with min A x1 and demo'd controlled lower to seat. PT waited for a couple minutes, but then pt reporting he needed to \"sit for a while.\" Gave pt his call bell and notified aid/RN. Encouraged pt to transfer to chair following toileting. PT will continue to follow peripherally, anticipate return to LTC at [place].     Time Calculation:   PT Evaluation Complexity  History, PT Evaluation Complexity: 1-2 personal factors and/or comorbidities  Examination of Body Systems (PT Eval Complexity): total of 3 or more elements  Clinical Presentation (PT Evaluation Complexity): stable  Clinical Decision Making (PT Evaluation Complexity): low complexity  Overall Complexity (PT Evaluation Complexity): low complexity     PT Charges       Row Name 02/19/25 1312             Time Calculation    Start Time 1045  -      Stop Time 1057  -      Time Calculation (min) 12 min  -      PT Received On 02/19/25  -      PT - Next Appointment 02/21/25  -         Time Calculation- PT    Total Timed Code Minutes- PT 12 minute(s)  -         Timed Charges    87677 - PT Therapeutic Activity Minutes 12  -         Total Minutes    Timed Charges Total Minutes 12  -       Total Minutes 12  -                User Key  (r) = Recorded By, (t) = Taken By, (c) = Cosigned By      Initials Name Provider Type     Diana Morales, PT Physical Therapist                  Therapy Charges for Today       Code Description Service Date Service Provider Modifiers Qty    [id number]  PT THERAPEUTIC ACT EA 15 MIN 2/19/2025 Diana Morales, PT GP 1            PT G-Codes  Outcome Measure Options: AM-PAC 6 Clicks Basic Mobility (PT)  AM-PAC 6 Clicks Score (PT): 15  PT Discharge Summary  Anticipated Discharge Disposition " (PT): extended care facility    Diana Morales, PT  2/19/2025

## 2025-02-19 NOTE — DISCHARGE PLACEMENT REQUEST
"Tony Casey (84 y.o. Male)       Date of Birth   1940    Social Security Number       Address   [address] [place] [address] [place] [address]    Home Phone   [phone]    MRN   [id number]       Restorationism   Religious    Marital Status   Single                            Admission Date   2/16/25    Admission Type   Emergency    Admitting Provider   Cedrick Chau MD    Attending Provider   Jorje Mcdermott MD    Department, Room/Bed   [address], E552/1       Discharge Date       Discharge Disposition   Long Term Care (DC - External)    Discharge Destination                                 Attending Provider: Jorje Mcdermott MD    Allergies: Daliresp [Roflumilast], Latex, Sulfa Antibiotics    Isolation: Droplet   Infection: Influenza (02/16/25)   Code Status: CPR    Ht: 180.3 cm (71\")   Wt: 73.5 kg (162 lb 0.6 oz)    Admission Cmt: None   Principal Problem: Influenza A [J10.1]                   Active Insurance as of 2/16/2025       Primary Coverage       Payor Plan Insurance Group Employer/Plan Group    MEDICARE MEDICARE A & B        Payor Plan Address Payor Plan Phone Number Payor Plan Fax Number Effective Dates    [address] [phone]  5/1/2003 - None Entered    [facility] [id number]         Subscriber Name Subscriber Birth Date Member ID       TONY CASEY 1940 [id number]               Secondary Coverage       Payor Plan Insurance Group Employer/Plan Group     FOR LIFE  FOR LIFE MC SUP         Payor Plan Address Payor Plan Phone Number Payor Plan Fax Number Effective Dates    [address] [phone]  3/10/2016 - None Entered    [facility] [address]         Subscriber Name Subscriber Birth Date Member ID       TONY CASEY 1940 [id number]                     Emergency Contacts        (Rel.) Home Phone Work Phone Mobile Phone    VICKY CASEY (Son) [phone] -- [phone]    " Frederick Leonard (Son) -- -- [phone]    Ricardo Leonard (Son) [phone] -- [phone]                 Discharge Summary        Jorje Mcdermott MD at 02/19/25 1129              Date of Admission: 2/16/2025  Date of Discharge:  2/19/2025  Primary Care Physician: Alfonso Goldstein MD     Discharge Diagnosis:  Active Hospital Problems    Diagnosis  POA    **Influenza A [J10.1]  Yes    Altered mental status [R41.82]  Yes    COPD with acute exacerbation [J44.1]  Yes    Chronic respiratory failure with hypoxia [J96.11]  Yes    Neuropathy [G62.9]  Yes    GERD (gastroesophageal reflux disease) [K21.9]  Yes      Resolved Hospital Problems   No resolved problems to display.       Presenting Problem/History of Present Illness from H&P:  Influenza A [J10.1]     Patient is 84-year-old male with history of COPD, GERD, neuropathy who is a nursing home resident was sent to the hospital due to worsening confusion, decreased p.o. intake and dyspnea.  He is on 2 L of oxygen that he normally uses at home and currently requiring 2 L.  Underwent routine workup including chest x-ray which revealed right upper lobe opacity and lactate was 1.9.  Urinalysis with no evidence of UTI.  Tested positive for flu.  No reports of any fevers or chills.  At the time of my evaluation patient is oriented to person and place and unable to provide a good history.  Most of the information was obtained from the ER provider and chart review.  He denies any fevers, chills, runny nose, sore throat.  [facility],    [facility] Course:  The patient is a 84 y.o. male who presented with influenza A, pneumonia, COPD exacerbation in the setting of chronic hypoxic respiratory failure.  He was admitted and given Tamiflu steroids nebulizers and antibiotics.  He has had improvement in his shortness of breath and is on his home 2 L now.  Will transition to Omnicef to complete his oral antibiotic course.  Continue the increased dose of prednisone to complete a  course and then he can resume his home chronic prednisone dose.  He should follow-up with his home providers.  He is also going to complete a Tamiflu course.      Exam Today:  General AA NAD  HEENT NCAT  CV RRR  Lung decreased breath sounds with rhonchi bilaterally and prolonged expiration  Abdomen ND NT  Extremity no cyanosis or pitting  Neuro CN II through XII grossly intact  Psych normal mood and affect    Results:  CXR  1. Low lung volumes with suspected subsegmental atelectasis at the  bases.  2. Linear right upper lobe opacity, suspect subsegmental atelectasis or  linear scarring.  3. Small hiatal hernia    CT Head  No CT evidence for acute intracranial pathology.  The etiology of the  patient's altered mental status is not further elucidated on this  examination. If further radiographic assessment is warranted, one could  obtain an MRI of the brain for follow-up.    CT Chest  1. There is mild bronchial thickening at both lower lobes and the medial  right lower lobe bronchus is partially opacified, image 61. There are  reticular nodular opacities at the lingula which were not definitively  present previously and may represent acute bronchiolitis. There are no  dense consolidations or definitive pneumonia, but there is a tiny left  pleural effusion. There is a new tiny pericardial effusion as well.     2. The entire esophagus is at least mildly dilated with fluid and there  is a small volume of fluid within the stomach. There is presumably  gastroesophageal reflux and aspiration precautions are recommended.     3. There is stable 4.2 cm dilatation of the ascending thoracic aorta. A  few coronary artery calcifications are noted.    Procedures Performed:         Consults:   Consults       Date and Time Order Name Status Description    2/16/2025  1:26 PM [facility] (on-call MD unless specified) Details               Discharge Disposition:  Long Term Care (DC - External)    Discharge Medications:     Discharge Medications         New Medications        Instructions Start Date   cefdinir 300 MG capsule  Commonly known as: OMNICEF   300 mg, Oral, 2 Times Daily      oseltamivir 30 MG capsule  Commonly known as: TAMIFLU   30 mg, Oral, Every 12 Hours Scheduled             Changes to Medications        Instructions Start Date   predniSONE 20 MG tablet  Commonly known as: DELTASONE  What changed:   when to take this  additional instructions   20 mg, Oral, 2 Times Daily, Then resume taking home chronic prednisone dose daily.             Continue These Medications        Instructions Start Date   acetaminophen 325 MG tablet  Commonly known as: TYLENOL   650 mg, Oral, Every 4 Hours PRN      albuterol (2.5 MG/3ML) 0.083% nebulizer solution  Commonly known as: PROVENTIL   2.5 mg, Nebulization, Every 6 Hours PRN      Aplisol 5 UNIT/0.1ML injection  Generic drug: tuberculin   5 Units, Once      azithromycin 250 MG tablet  Commonly known as: ZITHROMAX   Indications: Worsening of Chronic Obstructive Lung Disease      calcium carbonate 500 MG chewable tablet  Commonly known as: TUMS   2 tablets, Every 4 Hours PRN      dicyclomine 10 MG capsule  Commonly known as: BENTYL   10 mg, Oral, 3 Times Daily PRN      dimethicone 1.3 % lotion lotion  Commonly known as: AVEENO   1 Application, Every 12 Hours PRN      ferrous sulfate 325 (65 FE) MG tablet   325 mg, Daily With Breakfast      FLUoxetine 20 MG capsule  Commonly known as: PROzac   TAKE 1 CAPSULE DAILY      furosemide 20 MG tablet  Commonly known as: LASIX   20 mg, 2 Times Daily      gabapentin 300 MG capsule  Commonly known as: NEURONTIN   300 mg, Oral, 3 Times Daily      guaiFENesin 600 MG 12 hr tablet  Commonly known as: MUCINEX   1,200 mg, 2 Times Daily      Melatonin 3 MG capsule   3 mg, As Needed      menthol-zinc oxide 0.44-20.625 % ointment ointment  Commonly known as: CALMOSEPTINE   1 Application, Every 12 Hours Scheduled      Multivitamin Adult tablet tablet  Generic drug: multivitamin with  minerals   1 tablet, Oral, Daily      ondansetron ODT 4 MG disintegrating tablet  Commonly known as: ZOFRAN-ODT   4 mg, Translingual, Every 6 Hours PRN      pantoprazole 40 MG EC tablet  Commonly known as: PROTONIX   40 mg, Oral, Daily      potassium chloride 20 MEQ packet  Commonly known as: KLOR-CON   20 mEq, Daily      sodium chloride 7 % nebulizer solution nebulizer solution   4 mL, Nebulization, Every 4 Hours PRN      sucralfate 1 g tablet  Commonly known as: Carafate   1 g, Oral, 4 Times Daily      [employer] Back Pain Therapy misc   1 Pad, Daily      tiZANidine 2 MG tablet  Commonly known as: ZANAFLEX   2 mg, Oral, Daily PRN      Trelegy Ellipta 100-62.5-25 MCG/ACT inhaler  Generic drug: Fluticasone-Umeclidin-Vilant   1 puff, Inhalation, Daily - RT               Discharge Diet:   Diet Instructions       Diet: Regular/House Diet; Soft to Chew (NDD 3); Chopped Meat; Thin (IDDSI 0)      Discharge Diet: Regular/House Diet    Texture: Soft to Chew (NDD 3)    Soft to Chew: Chopped Meat    Fluid Consistency: Thin (IDDSI 0)            Activity at Discharge:   Activity Instructions       Activity as Tolerated              Follow-up Appointments:   Contact information for follow-up providers       Alfonso Goldstein MD Follow up.    Specialty: Family Medicine  Contact information:  [address]  [place] [address]  [phone]                       Contact information for after-discharge care       Destination       [facility] .    Service: Nursing Home  Contact information:  [facility] [place] [place] [id number] [phone]                                   Test Results Pending at Discharge:       Jorje Mcdermott MD  02/19/25  11:29 EST    Time Spent on Discharge Activities: >30 minutes    Dictated portions using Dragon dictation software.    Electronically signed by Jorje Mcdermott MD at 02/19/25 8646

## 2025-02-19 NOTE — PLAN OF CARE
"Goal Outcome Evaluation:  Plan of Care Reviewed With: patient        Progress: improving  Outcome Evaluation: Pt seen for PT this AM and tolerated mobility well. Pt transferred to EOB with CGA and stood with min A x1 and rwx. Pt reporting needing to have a BM upon standing, transferred to BSC with min A x1 and demo'd controlled lower to seat. PT waited for a couple minutes, but then pt reporting he needed to \"sit for a while.\" Gave pt his call bell and notified aid/RN. Encouraged pt to transfer to chair following toileting. PT will continue to follow peripherally, anticipate return to LTC at [place].    Anticipated Discharge Disposition (PT): extended care facility                        "

## 2025-02-19 NOTE — PROGRESS NOTES
Enter Query Response Below      Query Response: Viral pneumonia              If applicable, please update the problem list.

## 2025-02-26 ENCOUNTER — HOSPITAL ENCOUNTER (INPATIENT)
Facility: HOSPITAL | Age: 85
LOS: 6 days | Discharge: HOME | End: 2025-03-04
Attending: EMERGENCY MEDICINE | Admitting: STUDENT IN AN ORGANIZED HEALTH CARE EDUCATION/TRAINING PROGRAM
Payer: MEDICARE

## 2025-02-26 ENCOUNTER — APPOINTMENT (OUTPATIENT)
Dept: GENERAL RADIOLOGY | Facility: HOSPITAL | Age: 85
End: 2025-02-26
Payer: MEDICARE

## 2025-02-26 DIAGNOSIS — I50.32 CHRONIC DIASTOLIC CHF (CONGESTIVE HEART FAILURE): ICD-10-CM

## 2025-02-26 DIAGNOSIS — J18.9 PNEUMONIA OF LEFT LOWER LOBE DUE TO INFECTIOUS ORGANISM: Primary | ICD-10-CM

## 2025-02-26 DIAGNOSIS — J44.1 COPD WITH ACUTE EXACERBATION: ICD-10-CM

## 2025-02-26 LAB
ALBUMIN SERPL-MCNC: 2.3 G/DL (ref 3.5–5.2)
ALBUMIN/GLOB SERPL: 0.7 G/DL
ALP SERPL-CCNC: 65 U/L (ref 39–117)
ALT SERPL W P-5'-P-CCNC: 20 U/L (ref 1–41)
ANION GAP SERPL CALCULATED.3IONS-SCNC: 11.3 MMOL/L (ref 5–15)
AST SERPL-CCNC: 16 U/L (ref 1–40)
B PARAPERT DNA SPEC QL NAA+PROBE: NOT DETECTED
B PERT DNA SPEC QL NAA+PROBE: NOT DETECTED
BASOPHILS # BLD AUTO: 0.02 10*3/MM3 (ref 0–0.2)
BASOPHILS NFR BLD AUTO: 0.1 % (ref 0–1.5)
BILIRUB SERPL-MCNC: 1 MG/DL (ref 0–1.2)
BUN SERPL-MCNC: 19 MG/DL (ref 8–23)
BUN/CREAT SERPL: 17.3 (ref 7–25)
C PNEUM DNA NPH QL NAA+NON-PROBE: NOT DETECTED
CALCIUM SPEC-SCNC: 8.5 MG/DL (ref 8.6–10.5)
CHLORIDE SERPL-SCNC: 101 MMOL/L (ref 98–107)
CO2 SERPL-SCNC: 23.7 MMOL/L (ref 22–29)
CREAT SERPL-MCNC: 1.1 MG/DL (ref 0.76–1.27)
D-LACTATE SERPL-SCNC: 1.4 MMOL/L (ref 0.5–2)
DEPRECATED RDW RBC AUTO: 55.1 FL (ref 37–54)
EGFRCR SERPLBLD CKD-EPI 2021: 65.4 ML/MIN/1.73
EOSINOPHIL # BLD AUTO: 0 10*3/MM3 (ref 0–0.4)
EOSINOPHIL NFR BLD AUTO: 0 % (ref 0.3–6.2)
ERYTHROCYTE [DISTWIDTH] IN BLOOD BY AUTOMATED COUNT: 16.5 % (ref 12.3–15.4)
FLUAV SUBTYP SPEC NAA+PROBE: NOT DETECTED
FLUBV RNA ISLT QL NAA+PROBE: NOT DETECTED
GEN 5 1HR TROPONIN T REFLEX: 32 NG/L
GLOBULIN UR ELPH-MCNC: 3.5 GM/DL
GLUCOSE SERPL-MCNC: 109 MG/DL (ref 65–99)
HADV DNA SPEC NAA+PROBE: NOT DETECTED
HCOV 229E RNA SPEC QL NAA+PROBE: NOT DETECTED
HCOV HKU1 RNA SPEC QL NAA+PROBE: NOT DETECTED
HCOV NL63 RNA SPEC QL NAA+PROBE: NOT DETECTED
HCOV OC43 RNA SPEC QL NAA+PROBE: NOT DETECTED
HCT VFR BLD AUTO: 36.2 % (ref 37.5–51)
HGB BLD-MCNC: 12.3 G/DL (ref 13–17.7)
HMPV RNA NPH QL NAA+NON-PROBE: NOT DETECTED
HPIV1 RNA ISLT QL NAA+PROBE: NOT DETECTED
HPIV2 RNA SPEC QL NAA+PROBE: NOT DETECTED
HPIV3 RNA NPH QL NAA+PROBE: NOT DETECTED
HPIV4 P GENE NPH QL NAA+PROBE: NOT DETECTED
IMM GRANULOCYTES # BLD AUTO: 0.15 10*3/MM3 (ref 0–0.05)
IMM GRANULOCYTES NFR BLD AUTO: 0.8 % (ref 0–0.5)
LYMPHOCYTES # BLD AUTO: 1.24 10*3/MM3 (ref 0.7–3.1)
LYMPHOCYTES NFR BLD AUTO: 7 % (ref 19.6–45.3)
M PNEUMO IGG SER IA-ACNC: NOT DETECTED
MCH RBC QN AUTO: 30.8 PG (ref 26.6–33)
MCHC RBC AUTO-ENTMCNC: 34 G/DL (ref 31.5–35.7)
MCV RBC AUTO: 90.5 FL (ref 79–97)
MONOCYTES # BLD AUTO: 1.47 10*3/MM3 (ref 0.1–0.9)
MONOCYTES NFR BLD AUTO: 8.2 % (ref 5–12)
NEUTROPHILS NFR BLD AUTO: 14.95 10*3/MM3 (ref 1.7–7)
NEUTROPHILS NFR BLD AUTO: 83.9 % (ref 42.7–76)
NRBC BLD AUTO-RTO: 0 /100 WBC (ref 0–0.2)
NT-PROBNP SERPL-MCNC: 200 PG/ML (ref 0–1800)
PLATELET # BLD AUTO: 301 10*3/MM3 (ref 140–450)
PMV BLD AUTO: 8.9 FL (ref 6–12)
POTASSIUM SERPL-SCNC: 3.7 MMOL/L (ref 3.5–5.2)
PROCALCITONIN SERPL-MCNC: 0.47 NG/ML (ref 0–0.25)
PROT SERPL-MCNC: 5.8 G/DL (ref 6–8.5)
RBC # BLD AUTO: 4 10*6/MM3 (ref 4.14–5.8)
RHINOVIRUS RNA SPEC NAA+PROBE: NOT DETECTED
RSV RNA NPH QL NAA+NON-PROBE: NOT DETECTED
SARS-COV-2 RNA NPH QL NAA+NON-PROBE: NOT DETECTED
SODIUM SERPL-SCNC: 136 MMOL/L (ref 136–145)
TROPONIN T % DELTA: -6
TROPONIN T NUMERIC DELTA: -2 NG/L
TROPONIN T SERPL HS-MCNC: 34 NG/L
WBC NRBC COR # BLD AUTO: 17.83 10*3/MM3 (ref 3.4–10.8)

## 2025-02-26 PROCEDURE — 71045 X-RAY EXAM CHEST 1 VIEW: CPT

## 2025-02-26 PROCEDURE — 93010 ELECTROCARDIOGRAM REPORT: CPT | Performed by: INTERNAL MEDICINE

## 2025-02-26 PROCEDURE — 87040 BLOOD CULTURE FOR BACTERIA: CPT | Performed by: EMERGENCY MEDICINE

## 2025-02-26 PROCEDURE — 83880 ASSAY OF NATRIURETIC PEPTIDE: CPT | Performed by: EMERGENCY MEDICINE

## 2025-02-26 PROCEDURE — 93005 ELECTROCARDIOGRAM TRACING: CPT | Performed by: EMERGENCY MEDICINE

## 2025-02-26 PROCEDURE — 84145 PROCALCITONIN (PCT): CPT | Performed by: EMERGENCY MEDICINE

## 2025-02-26 PROCEDURE — 84484 ASSAY OF TROPONIN QUANT: CPT | Performed by: EMERGENCY MEDICINE

## 2025-02-26 PROCEDURE — 83605 ASSAY OF LACTIC ACID: CPT | Performed by: EMERGENCY MEDICINE

## 2025-02-26 PROCEDURE — 36415 COLL VENOUS BLD VENIPUNCTURE: CPT | Performed by: EMERGENCY MEDICINE

## 2025-02-26 PROCEDURE — 80053 COMPREHEN METABOLIC PANEL: CPT | Performed by: EMERGENCY MEDICINE

## 2025-02-26 PROCEDURE — 0202U NFCT DS 22 TRGT SARS-COV-2: CPT | Performed by: EMERGENCY MEDICINE

## 2025-02-26 PROCEDURE — 25010000002 PIPERACILLIN SOD-TAZOBACTAM PER 1 G: Performed by: EMERGENCY MEDICINE

## 2025-02-26 PROCEDURE — 85025 COMPLETE CBC W/AUTO DIFF WBC: CPT | Performed by: EMERGENCY MEDICINE

## 2025-02-26 PROCEDURE — 36415 COLL VENOUS BLD VENIPUNCTURE: CPT

## 2025-02-26 PROCEDURE — 99285 EMERGENCY DEPT VISIT HI MDM: CPT

## 2025-02-26 RX ORDER — ACETAMINOPHEN 500 MG
1000 TABLET ORAL ONCE
Status: COMPLETED | OUTPATIENT
Start: 2025-02-26 | End: 2025-02-26

## 2025-02-26 RX ADMIN — ACETAMINOPHEN 1000 MG: 500 TABLET, FILM COATED ORAL at 19:44

## 2025-02-26 RX ADMIN — PIPERACILLIN AND TAZOBACTAM 3.38 G: 3; .375 INJECTION, POWDER, FOR SOLUTION INTRAVENOUS at 23:00

## 2025-02-27 LAB
ANION GAP SERPL CALCULATED.3IONS-SCNC: 12 MMOL/L (ref 5–15)
BUN SERPL-MCNC: 21 MG/DL (ref 8–23)
BUN/CREAT SERPL: 18.1 (ref 7–25)
CALCIUM SPEC-SCNC: 8.3 MG/DL (ref 8.6–10.5)
CHLORIDE SERPL-SCNC: 100 MMOL/L (ref 98–107)
CO2 SERPL-SCNC: 24 MMOL/L (ref 22–29)
CREAT SERPL-MCNC: 1.16 MG/DL (ref 0.76–1.27)
DEPRECATED RDW RBC AUTO: 54.5 FL (ref 37–54)
EGFRCR SERPLBLD CKD-EPI 2021: 61.3 ML/MIN/1.73
ERYTHROCYTE [DISTWIDTH] IN BLOOD BY AUTOMATED COUNT: 16.2 % (ref 12.3–15.4)
GLUCOSE SERPL-MCNC: 91 MG/DL (ref 65–99)
HCT VFR BLD AUTO: 36.2 % (ref 37.5–51)
HGB BLD-MCNC: 12.2 G/DL (ref 13–17.7)
L PNEUMO1 AG UR QL IA: NEGATIVE
MCH RBC QN AUTO: 30.6 PG (ref 26.6–33)
MCHC RBC AUTO-ENTMCNC: 33.7 G/DL (ref 31.5–35.7)
MCV RBC AUTO: 90.7 FL (ref 79–97)
MRSA DNA SPEC QL NAA+PROBE: NORMAL
PLATELET # BLD AUTO: 288 10*3/MM3 (ref 140–450)
PMV BLD AUTO: 8.7 FL (ref 6–12)
POTASSIUM SERPL-SCNC: 3.9 MMOL/L (ref 3.5–5.2)
QT INTERVAL: 338 MS
QTC INTERVAL: 450 MS
RBC # BLD AUTO: 3.99 10*6/MM3 (ref 4.14–5.8)
S PNEUM AG SPEC QL LA: NEGATIVE
SODIUM SERPL-SCNC: 136 MMOL/L (ref 136–145)
WBC NRBC COR # BLD AUTO: 15.69 10*3/MM3 (ref 3.4–10.8)

## 2025-02-27 PROCEDURE — 94799 UNLISTED PULMONARY SVC/PX: CPT

## 2025-02-27 PROCEDURE — 94761 N-INVAS EAR/PLS OXIMETRY MLT: CPT

## 2025-02-27 PROCEDURE — 87899 AGENT NOS ASSAY W/OPTIC: CPT

## 2025-02-27 PROCEDURE — 94640 AIRWAY INHALATION TREATMENT: CPT

## 2025-02-27 PROCEDURE — 87449 NOS EACH ORGANISM AG IA: CPT

## 2025-02-27 PROCEDURE — 80048 BASIC METABOLIC PNL TOTAL CA: CPT

## 2025-02-27 PROCEDURE — 87641 MR-STAPH DNA AMP PROBE: CPT

## 2025-02-27 PROCEDURE — 25010000002 PIPERACILLIN SOD-TAZOBACTAM PER 1 G

## 2025-02-27 PROCEDURE — 94664 DEMO&/EVAL PT USE INHALER: CPT

## 2025-02-27 PROCEDURE — 85027 COMPLETE CBC AUTOMATED: CPT

## 2025-02-27 PROCEDURE — 94762 N-INVAS EAR/PLS OXIMTRY CONT: CPT

## 2025-02-27 RX ORDER — POLYETHYLENE GLYCOL 3350 17 G/17G
17 POWDER, FOR SOLUTION ORAL DAILY PRN
Status: DISCONTINUED | OUTPATIENT
Start: 2025-02-27 | End: 2025-03-04 | Stop reason: HOSPADM

## 2025-02-27 RX ORDER — AMOXICILLIN 250 MG
2 CAPSULE ORAL 2 TIMES DAILY PRN
Status: DISCONTINUED | OUTPATIENT
Start: 2025-02-27 | End: 2025-03-04 | Stop reason: HOSPADM

## 2025-02-27 RX ORDER — SODIUM CHLORIDE FOR INHALATION 7 %
4 VIAL, NEBULIZER (ML) INHALATION EVERY 4 HOURS PRN
Status: DISCONTINUED | OUTPATIENT
Start: 2025-02-27 | End: 2025-03-04 | Stop reason: HOSPADM

## 2025-02-27 RX ORDER — IPRATROPIUM BROMIDE AND ALBUTEROL SULFATE 2.5; .5 MG/3ML; MG/3ML
3 SOLUTION RESPIRATORY (INHALATION)
Status: DISCONTINUED | OUTPATIENT
Start: 2025-02-27 | End: 2025-02-28

## 2025-02-27 RX ORDER — ARFORMOTEROL TARTRATE 15 UG/2ML
15 SOLUTION RESPIRATORY (INHALATION)
Status: DISCONTINUED | OUTPATIENT
Start: 2025-02-27 | End: 2025-03-04 | Stop reason: HOSPADM

## 2025-02-27 RX ORDER — ACETAMINOPHEN 325 MG/1
650 TABLET ORAL EVERY 4 HOURS PRN
Status: DISCONTINUED | OUTPATIENT
Start: 2025-02-27 | End: 2025-03-04 | Stop reason: HOSPADM

## 2025-02-27 RX ORDER — BISACODYL 5 MG/1
5 TABLET, DELAYED RELEASE ORAL DAILY PRN
Status: DISCONTINUED | OUTPATIENT
Start: 2025-02-27 | End: 2025-03-04 | Stop reason: HOSPADM

## 2025-02-27 RX ORDER — DICYCLOMINE HYDROCHLORIDE 10 MG/1
10 CAPSULE ORAL 3 TIMES DAILY PRN
Status: DISCONTINUED | OUTPATIENT
Start: 2025-02-27 | End: 2025-03-04 | Stop reason: HOSPADM

## 2025-02-27 RX ORDER — BUDESONIDE 0.5 MG/2ML
0.5 INHALANT ORAL
Status: DISCONTINUED | OUTPATIENT
Start: 2025-02-27 | End: 2025-03-04 | Stop reason: HOSPADM

## 2025-02-27 RX ORDER — SUCRALFATE 1 G/1
1 TABLET ORAL 4 TIMES DAILY
Status: DISCONTINUED | OUTPATIENT
Start: 2025-02-27 | End: 2025-03-04 | Stop reason: HOSPADM

## 2025-02-27 RX ORDER — GABAPENTIN 300 MG/1
300 CAPSULE ORAL 3 TIMES DAILY
Status: DISCONTINUED | OUTPATIENT
Start: 2025-02-27 | End: 2025-03-04 | Stop reason: HOSPADM

## 2025-02-27 RX ORDER — GUAIFENESIN 600 MG/1
1200 TABLET, EXTENDED RELEASE ORAL 2 TIMES DAILY
Status: DISCONTINUED | OUTPATIENT
Start: 2025-02-27 | End: 2025-03-04 | Stop reason: HOSPADM

## 2025-02-27 RX ORDER — ONDANSETRON 4 MG/1
4 TABLET, ORALLY DISINTEGRATING ORAL EVERY 6 HOURS PRN
Status: DISCONTINUED | OUTPATIENT
Start: 2025-02-27 | End: 2025-03-04 | Stop reason: HOSPADM

## 2025-02-27 RX ORDER — PANTOPRAZOLE SODIUM 40 MG/1
40 TABLET, DELAYED RELEASE ORAL DAILY
Status: DISCONTINUED | OUTPATIENT
Start: 2025-02-27 | End: 2025-03-04 | Stop reason: HOSPADM

## 2025-02-27 RX ORDER — IPRATROPIUM BROMIDE AND ALBUTEROL SULFATE 2.5; .5 MG/3ML; MG/3ML
3 SOLUTION RESPIRATORY (INHALATION) EVERY 6 HOURS PRN
Status: DISCONTINUED | OUTPATIENT
Start: 2025-02-27 | End: 2025-03-04 | Stop reason: HOSPADM

## 2025-02-27 RX ORDER — BISACODYL 10 MG
10 SUPPOSITORY, RECTAL RECTAL DAILY PRN
Status: DISCONTINUED | OUTPATIENT
Start: 2025-02-27 | End: 2025-03-04 | Stop reason: HOSPADM

## 2025-02-27 RX ORDER — ALBUTEROL SULFATE 0.83 MG/ML
2.5 SOLUTION RESPIRATORY (INHALATION) EVERY 6 HOURS PRN
Status: DISCONTINUED | OUTPATIENT
Start: 2025-02-27 | End: 2025-03-04 | Stop reason: HOSPADM

## 2025-02-27 RX ORDER — NITROGLYCERIN 0.4 MG/1
0.4 TABLET SUBLINGUAL
Status: DISCONTINUED | OUTPATIENT
Start: 2025-02-27 | End: 2025-03-04 | Stop reason: HOSPADM

## 2025-02-27 RX ORDER — ONDANSETRON 2 MG/ML
4 INJECTION INTRAMUSCULAR; INTRAVENOUS EVERY 6 HOURS PRN
Status: DISCONTINUED | OUTPATIENT
Start: 2025-02-27 | End: 2025-03-04 | Stop reason: HOSPADM

## 2025-02-27 RX ORDER — AZITHROMYCIN 250 MG/1
250 TABLET, FILM COATED ORAL
Status: DISCONTINUED | OUTPATIENT
Start: 2025-02-27 | End: 2025-03-03

## 2025-02-27 RX ADMIN — IPRATROPIUM BROMIDE AND ALBUTEROL SULFATE 3 ML: .5; 3 SOLUTION RESPIRATORY (INHALATION) at 06:43

## 2025-02-27 RX ADMIN — PANTOPRAZOLE SODIUM 40 MG: 40 TABLET, DELAYED RELEASE ORAL at 11:47

## 2025-02-27 RX ADMIN — BUDESONIDE 0.5 MG: 0.5 INHALANT RESPIRATORY (INHALATION) at 20:17

## 2025-02-27 RX ADMIN — PIPERACILLIN AND TAZOBACTAM 3.38 G: 3; .375 INJECTION, POWDER, FOR SOLUTION INTRAVENOUS at 05:05

## 2025-02-27 RX ADMIN — GABAPENTIN 300 MG: 300 CAPSULE ORAL at 15:26

## 2025-02-27 RX ADMIN — PIPERACILLIN AND TAZOBACTAM 3.38 G: 3; .375 INJECTION, POWDER, FOR SOLUTION INTRAVENOUS at 22:06

## 2025-02-27 RX ADMIN — GABAPENTIN 300 MG: 300 CAPSULE ORAL at 08:55

## 2025-02-27 RX ADMIN — IPRATROPIUM BROMIDE AND ALBUTEROL SULFATE 3 ML: .5; 3 SOLUTION RESPIRATORY (INHALATION) at 20:23

## 2025-02-27 RX ADMIN — GABAPENTIN 300 MG: 300 CAPSULE ORAL at 22:06

## 2025-02-27 RX ADMIN — GUAIFENESIN 1200 MG: 600 TABLET, MULTILAYER, EXTENDED RELEASE ORAL at 22:06

## 2025-02-27 RX ADMIN — BUDESONIDE 0.5 MG: 0.5 INHALANT RESPIRATORY (INHALATION) at 10:54

## 2025-02-27 RX ADMIN — IPRATROPIUM BROMIDE AND ALBUTEROL SULFATE 3 ML: .5; 3 SOLUTION RESPIRATORY (INHALATION) at 10:55

## 2025-02-27 RX ADMIN — PIPERACILLIN AND TAZOBACTAM 3.38 G: 3; .375 INJECTION, POWDER, FOR SOLUTION INTRAVENOUS at 12:57

## 2025-02-27 RX ADMIN — FLUOXETINE 20 MG: 20 CAPSULE ORAL at 11:45

## 2025-02-27 RX ADMIN — AZITHROMYCIN 250 MG: 250 TABLET, FILM COATED ORAL at 22:23

## 2025-02-27 RX ADMIN — ARFORMOTEROL TARTRATE 15 MCG: 15 SOLUTION RESPIRATORY (INHALATION) at 20:19

## 2025-02-27 RX ADMIN — SUCRALFATE 1 G: 1 TABLET ORAL at 22:05

## 2025-02-27 RX ADMIN — GUAIFENESIN 1200 MG: 600 TABLET, MULTILAYER, EXTENDED RELEASE ORAL at 11:44

## 2025-02-27 RX ADMIN — IPRATROPIUM BROMIDE AND ALBUTEROL SULFATE 3 ML: .5; 3 SOLUTION RESPIRATORY (INHALATION) at 15:12

## 2025-02-27 RX ADMIN — SUCRALFATE 1 G: 1 TABLET ORAL at 11:46

## 2025-02-27 NOTE — PROGRESS NOTES
[facility] Clinical Pharmacy Services: Piperacillin-Tazobactam Consult    Pt Name: Tony Leonard   : 1940    Indication: Pneumonia    Relevant clinical data and objective history reviewed:    Past Medical History:   Diagnosis Date    ADHD (attention deficit hyperactivity disorder)     Bronchiectasis     CHF (congestive heart failure)     Colon polyp     COPD (chronic obstructive pulmonary disease)     Diverticulosis     Hard of hearing     On home oxygen therapy     2 LITERS    Pneumonia     Prostate cancer 2019    Spinal stenosis, lumbar region with neurogenic claudication 2022     Creatinine   Date Value Ref Range Status   2025 1.10 0.76 - 1.27 mg/dL Final   2025 0.99 0.76 - 1.27 mg/dL Final   2025 0.98 0.76 - 1.27 mg/dL Final   10/02/2020 0.80 0.7 - 1.5 mg/dL Final   10/01/2020 0.90 0.7 - 1.5 mg/dL Final   10/01/2020 0.90 0.7 - 1.5 mg/dL Final     BUN   Date Value Ref Range Status   2025 19 8 - 23 mg/dL Final   10/02/2020 15 7 - 20 mg/dL Final     Estimated Creatinine Clearance: 50.1 mL/min (by C-G formula based on SCr of 1.1 mg/dL).    Lab Results   Component Value Date    WBC 17.83 (H) 2025     Temp Readings from Last 3 Encounters:   25 99.5 °F (37.5 °C) (Tympanic)   25 98.2 °F (36.8 °C) (Oral)   24 97.5 °F (36.4 °C) (Oral)      Assessment/Plan  Estimated CrCl >20 mL/min at this time; BMI 22.6 kg/m2  Will start piperacillin-tazobactam 3.375 g IV every 8 hours     Pharmacy will continue to follow daily while on piperacillin-tazobactam and adjust as needed. Thank you for this consult.    Lee Silva, [facility]  Clinical Pharmacist

## 2025-02-27 NOTE — ED NOTES
Pt to ER by EMS from [facility], per facility, pt was supposed to be out of quarantine two days ago, still sick and fever with cough. Pt is on 2L O2 baseline

## 2025-02-27 NOTE — ED PROVIDER NOTES
EMERGENCY DEPARTMENT ENCOUNTER  Room Number:  19/19  PCP: Alfonso Goldstein MD  Independent Historians: Patient, EMS who brought patient, nursing home records      HPI:  Chief Complaint: had concerns including Shortness of Breath.     A complete HPI/ROS/PMH/PSH/SH/FH are unobtainable due to:   Chronic or social conditions impacting patient care (Social Determinants of Health):       Context: Tony Leonard is a 84 y.o. male with a medical history of COPD who presents to the ED c/o acute cough, shortness of breath.  Patient is had cough ongoing over several weeks.  Cough is occasionally productive of clear sputum.  Patient denies chest pain.  He does have some fever upon arrival of 101.  Denies nausea vomiting or diarrhea.  Does report some lower back pain.      Review of prior external notes (non-ED) -and- Review of prior external test results outside of this encounter:   I reviewed prior medical records including recent hospitalization from 2/16 through 2/19 for COPD exacerbation in the setting of patient with chronic COPD.  Patient treated with Tamiflu, steroids and Omnicef.      Prescription drug monitoring program review:         PAST MEDICAL HISTORY  Active Ambulatory Problems     Diagnosis Date Noted    Thrush, oral 03/11/2016    ADD (attention deficit disorder) 03/11/2016    Hemorrhoids 03/11/2016    Bronchiectasis with acute lower respiratory infection 03/11/2016    Diverticul disease small and large intestine, no perforati or abscess 03/11/2016    Benign non-nodular prostatic hyperplasia without lower urinary tract symptoms 03/11/2016    Gastroesophageal reflux disease 03/11/2016    Malaise and fatigue 03/11/2016    GERD (gastroesophageal reflux disease) 04/25/2016    Environmental allergies 04/25/2016    Hemoptysis 04/27/2016    Dyslipidemia 05/11/2016    Primary osteoarthritis involving multiple joints 05/11/2016    Pneumonia of right lower lobe due to infectious organism 10/03/2016    Abnormal  EKG 10/04/2016    COPD (chronic obstructive pulmonary disease) 10/04/2016    Mild malnutrition 03/28/2017    Acute on chronic respiratory failure with hypoxia 04/27/2017    Fracture, intertrochanteric, right femur, closed, initial encounter 01/16/2018    Chronic diastolic CHF (congestive heart failure) 01/16/2018    Hematoma of frontal scalp 01/16/2018    Postoperative anemia due to acute blood loss 01/19/2018    Urinary retention 01/25/2018    Hemorrhagic disorder due to circulating anticoagulants 01/29/2018    History of fracture of right hip 02/22/2018    Closed fracture of right hip with routine healing 02/28/2018    Chronic low back pain with sciatica 06/21/2018    Change in bowel function 04/18/2019    Rectal bleeding 04/18/2019    Prostate cancer 04/22/2019    On home oxygen therapy 09/24/2019    Acute viral bronchitis 12/29/2019    Neuropathy 07/01/2021    Plantar fasciitis 09/08/2021    HTN (hypertension) 05/06/2022    Bilateral lower extremity edema 05/07/2022    Microscopic hematuria 05/08/2022    Acute midline low back pain with right-sided sciatica 08/01/2022    Spinal stenosis, lumbar region with neurogenic claudication 08/02/2022    Compression deformity of vertebra 08/02/2022    Rectal bleed 09/23/2022    Esophagitis 09/24/2022    Angiectasia 09/27/2022    Hiatal hernia 09/27/2022    Overweight (BMI 25.0-29.9) 11/14/2022    Leukocytosis 11/15/2022    Bilateral hip pain 11/21/2022    Elevated troponin level not due myocardial infarction 12/10/2022    Nocturnal hypoxia 12/10/2022    Pneumonia of right upper lobe due to infectious organism 12/29/2022    NSTEMI (non-ST elevated myocardial infarction) 01/29/2023    Chronic respiratory failure with hypoxia 01/29/2023    Paroxysmal atrial fibrillation 02/17/2023    Acute exacerbation of chronic obstructive pulmonary disease (COPD) 05/10/2023    Anemia 05/10/2023    Non-compliant patient 05/11/2023    Hypokalemia 10/01/2020    Spondylolisthesis of lumbar  region 08/14/2018    Elevated troponin 12/22/2023    Rhabdomyolysis 12/26/2023    Multiple contusions 12/26/2023    Fall 12/26/2023    Dizziness 04/09/2024    Contusion of hip 04/09/2024    Pulmonary embolism 05/21/2024    COPD with acute exacerbation 11/25/2024    Influenza A 02/16/2025    Altered mental status 02/16/2025     Resolved Ambulatory Problems     Diagnosis Date Noted    Pneumonia of both lower lobes due to infectious organism 03/11/2016    Pneumonia of right upper lobe due to infectious organism 04/22/2016    COPD exacerbation 04/25/2016    Chronic respiratory failure with hypoxia 10/04/2016    Bacterial lobar pneumonia 12/27/2016    Pneumonia of left lower lobe due to infectious organism 03/26/2017    Bronchitis 06/01/2017    COPD exacerbation 06/17/2017    Leukocytosis 01/16/2018    Right upper lobe pneumonia 01/20/2018    Mucus plugging of bronchi 01/16/2018    COPD exacerbation 04/16/2018    Degenerative joint disease (DJD) of hip 09/23/2019    Bronchiectasis with (acute) exacerbation 12/28/2019    COPD exacerbation 05/07/2022    Septic shock 11/26/2022    Acute saddle pulmonary embolism without acute cor pulmonale  - 12/11/2022 12/11/2022    Chest pain 04/19/2024     Past Medical History:   Diagnosis Date    ADHD (attention deficit hyperactivity disorder)     Bronchiectasis     CHF (congestive heart failure)     Colon polyp     Diverticulosis     Hard of hearing     Pneumonia          PAST SURGICAL HISTORY  Past Surgical History:   Procedure Laterality Date    BRONCHOSCOPY N/A 04/30/2016    Procedure: BRONCHOSCOPY with BAL of right lower lobe and left  lower lobe.  ;  Surgeon: Nando Diaz MD;  Location: [facility] ENDOSCOPY;  Service:     BRONCHOSCOPY N/A 04/28/2017    Procedure: BRONCHOSCOPY;  Surgeon: Katharina Salter MD;  Location: [facility] ENDOSCOPY;  Service:     BRONCHOSCOPY Bilateral 06/29/2017    Procedure: BRONCHOSCOPY WITH WASHINGS;  Surgeon: Katharina Salter MD;  Location: [facility]  ENDOSCOPY;  Service:     BRONCHOSCOPY N/A 01/22/2018    Procedure: BRONCHOSCOPY AT BEDSIDE with BAL;  Surgeon: Katharina Salter MD;  Location: [facility] ENDOSCOPY;  Service:     BRONCHOSCOPY N/A 01/30/2018    Procedure: BRONCHOSCOPY with BAL and washing;  Surgeon: Shreyas Wild MD;  [facility] END[facility]COPY;  Service:     BRONCHOSCOPY Bilateral 04/24/2018    Procedure: BRONCHOSCOPY WITH WASHING;  Surgeon: Katharina Salter MD;  [facility] ENDOSCOPY;  Service: Pulmonary    BRONCHOSCOPY N/A 12/28/2019    Procedure: BRONCHOSCOPY with bilateral washing;  Surgeon: Katharina Salter MD;  Location: [facility] ENDOSCOPY;  Service: Pulmonary    BRONCHOSCOPY Bilateral 01/04/2023    Procedure: BRONCHOSCOPY with lavage;  Surgeon: Katharina Salter MD;  Location: [facility] ENDOSCOPY;  Service: Pulmonary;  Laterality: Bilateral;  mucus plugging    CARDIAC CATHETERIZATION N/A 01/29/2023    Procedure: Left Heart Cath;  Surgeon: Johnnie Ang MD;  Location: [facility] CATH INVASIVE LOCATION;  Service: Cardiology;  Laterality: N/A;    CARDIAC CATHETERIZATION N/A 01/29/2023    Procedure: Coronary angiography;  Surgeon: Johnnie Ang MD;  Location: [facility] CATH INVASIVE LOCATION;  Service: Cardiology;  Laterality: N/A;    COLONOSCOPY  05/17/2013    eh, ih, tort, sig tics    COLONOSCOPY N/A 05/10/2019    Non-thrombosed external hemorrhoids found on perianal exam, Diverticulosis, Tortuous colon, One 5 mm polyp in the mid ascending colon, IH. Path: Tubular adenoma.     COLONOSCOPY N/A 09/24/2022    Procedure: COLONOSCOPY to cecum and TI with argon plasma coagulation.;  Surgeon: Bruce Black MD;  Location: [facility] ENDOSCOPY;  Service: Gastroenterology;  Laterality: N/A;  pre- GI bleeding  post- radiation proctitis, diverticulosis    ENDOSCOPY  03/19/2015    z line irreg, hh    ENDOSCOPY N/A 09/24/2022    Procedure: ESOPHAGOGASTRODUODENOSCOPY;  Surgeon: Bruce Black MD;  Location: [facility] ENDOSCOPY;  Service: Gastroenterology;   Laterality: N/A;  pre- GI bleeding  post- esophagitis, hiatal hernia    HIP OPEN REDUCTION Right 01/17/2018    Procedure: HIP OPEN REDUCTION INTERNAL FIXATION WITH DYNAMIC HIP SCREW;  Surgeon: Les Black MD;  Location: [facility] OR;  Service:     SPINE SURGERY      TONSILLECTOMY      TOTAL HIP ARTHROPLASTY REVISION Right 09/23/2019    Procedure: HIP  REVISION RIGHT;  Surgeon: Isauro Warner MD;  Location: [facility] OR;  Service: Orthopedics         FAMILY HISTORY  Family History   Problem Relation Age of Onset    Thyroid disease Mother     No Known Problems Father     Malig Hyperthermia Neg Hx          SOCIAL HISTORY  Social History     Socioeconomic History    Marital status: Single   Tobacco Use    Smoking status: Never    Smokeless tobacco: Never   Vaping Use    Vaping status: Never Used   Substance and Sexual Activity    Alcohol use: No     Comment: red wine occassional    Drug use: No    Sexual activity: Defer         ALLERGIES  Daliresp [roflumilast], Latex, and Sulfa antibiotics      REVIEW OF SYSTEMS  Review of Systems   Constitutional:  Positive for fatigue and fever.   Respiratory:  Positive for cough and shortness of breath.    Cardiovascular:  Negative for chest pain.   Musculoskeletal:  Positive for back pain.   All other systems reviewed and are negative.    Included in HPI  All systems reviewed and negative except for those discussed in HPI.      PHYSICAL EXAM    I have reviewed the triage vital signs and nursing notes.    ED Triage Vitals [02/26/25 1921]   Temp Heart Rate Resp BP SpO2   (!) 101 °F (38.3 °C) 107 20 100/58 94 %      Temp src Heart Rate Source Patient Position BP Location FiO2 (%)   Oral Monitor Lying Right arm --       Physical Exam  GENERAL: Alert well-appearing male no obvious distress.  Triage vitals reviewed notable for temperature of 101.  Heart rate is 107.  O2 sats 94% on 2 L nasal cannula  SKIN: Warm, dry  HENT: Normocephalic, atraumatic  EYES: no scleral  icterus  CV: regular rhythm, regular rate-no murmur  RESPIRATORY: normal effort, coarse bilateral rhonchi with fair air movement-O2 sats mid 90s on 2 L nasal cannula  ABDOMEN: soft, nontender, nondistended  MUSCULOSKELETAL: no deformity-mild peripheral edema  NEURO: alert, moves all extremities, follows commands      LAB RESULTS  Recent Results (from the past 24 hours)   Respiratory Panel PCR w/COVID-19(SARS-CoV-2) [facility] In-House, NP Swab in UTM/VTM, 2 HR TAT - Swab, Nasopharynx    Collection Time: 02/26/25  7:41 PM    Specimen: Nasopharynx; Swab   Result Value Ref Range    ADENOVIRUS, PCR Not Detected Not Detected    Coronavirus 229E Not Detected Not Detected    Coronavirus HKU1 Not Detected Not Detected    Coronavirus NL63 Not Detected Not Detected    Coronavirus OC43 Not Detected Not Detected    COVID19 Not Detected Not Detected - Ref. Range    Human Metapneumovirus Not Detected Not Detected    Human Rhinovirus/Enterovirus Not Detected Not Detected    Influenza A PCR Not Detected Not Detected    Influenza B PCR Not Detected Not Detected    Parainfluenza Virus 1 Not Detected Not Detected    Parainfluenza Virus 2 Not Detected Not Detected    Parainfluenza Virus 3 Not Detected Not Detected    Parainfluenza Virus 4 Not Detected Not Detected    RSV, PCR Not Detected Not Detected    Bordetella pertussis pcr Not Detected Not Detected    Bordetella parapertussis PCR Not Detected Not Detected    Chlamydophila pneumoniae PCR Not Detected Not Detected    Mycoplasma pneumo by PCR Not Detected Not Detected   ECG 12 Lead Dyspnea    Collection Time: 02/26/25  7:51 PM   Result Value Ref Range    QT Interval 338 ms    QTC Interval 450 ms   Lactic Acid, Plasma    Collection Time: 02/26/25  8:06 PM    Specimen: Blood   Result Value Ref Range    Lactate 1.4 0.5 - 2.0 mmol/L   Procalcitonin    Collection Time: 02/26/25  8:06 PM    Specimen: Blood   Result Value Ref Range    Procalcitonin 0.47 (H) 0.00 - 0.25 ng/mL    High Sensitivity Troponin T    Collection Time: 02/26/25  8:06 PM    Specimen: Blood   Result Value Ref Range    HS Troponin T 34 (H) <22 ng/L   BNP    Collection Time: 02/26/25  8:06 PM    Specimen: Blood   Result Value Ref Range    proBNP 200.0 0.0 - 1,800.0 pg/mL   High Sensitivity Troponin T 1Hr    Collection Time: 02/26/25  9:00 PM    Specimen: Arm, Left; Blood   Result Value Ref Range    HS Troponin T 32 (H) <22 ng/L    Troponin T Numeric Delta -2 ng/L    Troponin T % Delta -6 Abnormal if >/= 20%   Comprehensive Metabolic Panel    Collection Time: 02/26/25  9:00 PM    Specimen: Arm, Left; Blood   Result Value Ref Range    Glucose 109 (H) 65 - 99 mg/dL    BUN 19 8 - 23 mg/dL    Creatinine 1.10 0.76 - 1.27 mg/dL    Sodium 136 136 - 145 mmol/L    Potassium 3.7 3.5 - 5.2 mmol/L    Chloride 101 98 - 107 mmol/L    CO2 23.7 22.0 - 29.0 mmol/L    Calcium 8.5 (L) 8.6 - 10.5 mg/dL    Total Protein 5.8 (L) 6.0 - 8.5 g/dL    Albumin 2.3 (L) 3.5 - 5.2 g/dL    ALT (SGPT) 20 1 - 41 U/L    AST (SGOT) 16 1 - 40 U/L    Alkaline Phosphatase 65 39 - 117 U/L    Total Bilirubin 1.0 0.0 - 1.2 mg/dL    Globulin 3.5 gm/dL    A/G Ratio 0.7 g/dL    BUN/Creatinine Ratio 17.3 7.0 - 25.0    Anion Gap 11.3 5.0 - 15.0 mmol/L    eGFR 65.4 >60.0 mL/min/1.73   CBC Auto Differential    Collection Time: 02/26/25 10:02 PM    Specimen: Blood   Result Value Ref Range    WBC 17.83 (H) 3.40 - 10.80 10*3/mm3    RBC 4.00 (L) 4.14 - 5.80 10*6/mm3    Hemoglobin 12.3 (L) 13.0 - 17.7 g/dL    Hematocrit 36.2 (L) 37.5 - 51.0 %    MCV 90.5 79.0 - 97.0 fL    MCH 30.8 26.6 - 33.0 pg    MCHC 34.0 31.5 - 35.7 g/dL    RDW 16.5 (H) 12.3 - 15.4 %    RDW-SD 55.1 (H) 37.0 - 54.0 fl    MPV 8.9 6.0 - 12.0 fL    Platelets 301 140 - 450 10*3/mm3    Neutrophil % 83.9 (H) 42.7 - 76.0 %    Lymphocyte % 7.0 (L) 19.6 - 45.3 %    Monocyte % 8.2 5.0 - 12.0 %    Eosinophil % 0.0 (L) 0.3 - 6.2 %    Basophil % 0.1 0.0 - 1.5 %    Immature Grans % 0.8 (H) 0.0 - 0.5 %    Neutrophils,  Absolute 14.95 (H) 1.70 - 7.00 10*3/mm3    Lymphocytes, Absolute 1.24 0.70 - 3.10 10*3/mm3    Monocytes, Absolute 1.47 (H) 0.10 - 0.90 10*3/mm3    Eosinophils, Absolute 0.00 0.00 - 0.40 10*3/mm3    Basophils, Absolute 0.02 0.00 - 0.20 10*3/mm3    Immature Grans, Absolute 0.15 (H) 0.00 - 0.05 10*3/mm3    nRBC 0.0 0.0 - 0.2 /100 WBC         RADIOLOGY  XR Chest 1 View    Result Date: 2/26/2025  AP CHEST  HISTORY: Shortness of breath, cough and fever  COMPARISON: 2/16/2025  FINDINGS: There is patchy airspace infiltrate in the left lung base most consistent with pneumonia. There may be a small left pleural effusion. Heart size normal. Postsurgical changes of the cervical spine      Patchy left lung base infiltrate most consistent with pneumonia. There may be a small left pleural effusion. Follow-up to clearing is recommended    This report was finalized on 2/26/2025 7:44 PM by Dr. Ricardo Wu M.D on Workstation: [id number]         MEDICATIONS GIVEN IN ER  Medications   piperacillin-tazobactam (ZOSYN) 3.375 g IVPB in 100 mL NS MBP (CD) (3.375 g Intravenous New Bag 2/26/25 2300)   acetaminophen (TYLENOL) tablet 1,000 mg (1,000 mg Oral Given 2/26/25 1944)         ORDERS PLACED DURING THIS VISIT:  Orders Placed This Encounter   Procedures    Respiratory Panel PCR w/COVID-19(SARS-CoV-2) [facility] In-House, NP Swab in UTM/VTM, 2 HR TAT - Swab, Nasopharynx    Blood Culture - Blood,    Blood Culture - Blood,    XR Chest 1 View    Lactic Acid, Plasma    Procalcitonin    High Sensitivity Troponin T    BNP    High Sensitivity Troponin T 1Hr    Comprehensive Metabolic Panel    CBC Auto Differential    [facility] (on-call MD unless specified) Details    Inpatient Palliative Care Team Consult    ECG 12 Lead Dyspnea    Inpatient Admission    CBC & Differential         OUTPATIENT MEDICATION MANAGEMENT:  Current Facility-Administered Medications Ordered in Epic   Medication Dose Route Frequency Provider Last Rate Last Admin     piperacillin-tazobactam (ZOSYN) 3.375 g IVPB in 100 mL NS MBP (CD)  3.375 g Intravenous Once Rudi, Jered DODSON MD   3.375 g at 02/26/25 2300     Current Outpatient Medications Ordered in Epic   Medication Sig Dispense Refill    acetaminophen (TYLENOL) 325 MG tablet Take 2 tablets by mouth Every 4 (Four) Hours As Needed for Mild Pain.      albuterol (PROVENTIL) (2.5 MG/3ML) 0.083% nebulizer solution Take 2.5 mg by nebulization Every 6 (Six) Hours As Needed for Wheezing. 360 mL 3    azithromycin (ZITHROMAX) 250 MG tablet Indications: Worsening of Chronic Obstructive Lung Disease 4 tablet 0    calcium carbonate (TUMS) 500 MG chewable tablet Chew 2 tablets Every 4 (Four) Hours As Needed for Indigestion or Heartburn.      dicyclomine (BENTYL) 10 MG capsule Take 1 capsule by mouth 3 (Three) Times a Day As Needed (abdominal bloating). 30 capsule 0    dimethicone (AVEENO) 1.3 % lotion lotion Apply 1 Application topically to the appropriate area as directed Every 12 (Twelve) Hours As Needed.      ferrous sulfate 325 (65 FE) MG tablet Take 1 tablet by mouth Daily With Breakfast.      FLUoxetine (PROzac) 20 MG capsule TAKE 1 CAPSULE DAILY 90 capsule 3    Fluticasone-Umeclidin-Vilant (Trelegy Ellipta) 100-62.5-25 MCG/ACT inhaler Inhale 1 puff Daily.      furosemide (LASIX) 20 MG tablet Take 1 tablet by mouth 2 (Two) Times a Day.      gabapentin (NEURONTIN) 300 MG capsule Take 1 capsule by mouth 3 (Three) Times a Day. 6 capsule 0    guaiFENesin (MUCINEX) 600 MG 12 hr tablet Take 2 tablets by mouth 2 (Two) Times a Day.      Heat Wraps (ThermaCare Back Pain Therapy) misc Use 1 Pad Daily.      Melatonin 3 MG capsule Take 1 capsule by mouth As Needed.      menthol-zinc oxide (CALMOSEPTINE) 0.44-20.625 % ointment ointment Apply 1 Application topically to the appropriate area as directed Every 12 (Twelve) Hours.      multivitamin with minerals (Multivitamin Adult) tablet tablet Take 1 tablet by mouth Daily. 30 tablet 11     ondansetron ODT (ZOFRAN-ODT) 4 MG disintegrating tablet Place 1 tablet on the tongue Every 6 (Six) Hours As Needed for Nausea or Vomiting.      pantoprazole (PROTONIX) 40 MG EC tablet Take 1 tablet by mouth Daily. 90 tablet 3    potassium chloride (KLOR-CON) 20 MEQ packet Take 20 mEq by mouth Daily.      sodium chloride 7 % nebulizer solution nebulizer solution Take 4 mL by nebulization Every 4 (Four) Hours As Needed (pt takes as needed at home).      sucralfate (Carafate) 1 g tablet Take 1 tablet by mouth 4 (Four) Times a Day. 120 tablet 0    tiZANidine (ZANAFLEX) 2 MG tablet Take 1 tablet by mouth Daily As Needed for Muscle Spasms.      tuberculin (Aplisol) 5 UNIT/0.1ML injection Inject 0.1 mL into the appropriate area of the skin as directed by provider 1 (One) Time.           PROCEDURES  Procedures            PROGRESS, DATA ANALYSIS, CONSULTS, AND MEDICAL DECISION MAKING  All labs have been independently interpreted by me.  All radiology studies have been reviewed by me. All EKG's have been independently viewed and interpreted by me.  Discussion below represents my analysis of pertinent findings related to patient's condition, differential diagnosis, treatment plan and final disposition.    Differential diagnosis includes but is not limited to URI, pneumonia, COPD exacerbation, dehydration, electrolyte disturbance.      ED Course as of 02/26/25 2308 Wed Feb 26, 2025 1957 EKG independently interpreted by me    Time 7:51 PM  Sinus tachycardia 106  Normal P waves and CA intervals  QRS-left axis deviation, unremarkable QRS  ST, T waves-nonspecific changes  Not significant change when compared to 2/16/2025 [DB]   1958 Chest x-ray independently interpreted by me shows patchy left base density, possible pneumonia. [DB]   2235 Respiratory viral panel negative for tested pathogens. [DB]   2235 White blood cell count is elevated at 17.8 worrisome for possible developing pneumonia.  Patient also with elevated  procalcitonin of 0.47.  Suspect developing bacterial pneumonia.    Lactic acid is normal at 1.4 on go against sepsis. [DB]      ED Course User Index  [DB] Jered Grijalva MD             AS OF 23:08 EST VITALS:    BP - 110/69  HR - 102  TEMP - 99.5 °F (37.5 °C) (Tympanic)  O2 SATS - 92%    COMPLEXITY OF CARE  84-year-old male with history of COPD and recent admission with influenza presents with persistent cough, fever and shortness of breath.    Please see ED course above my independent evaluation and interpretation of ED testing notable for the following:    Chest x-ray with patchy left base density, likely pneumonia  EKG similar to prior without obvious acute ischemia  CBC with elevated white count of 17.8 suggestive of underlying infection  Chemistries benign without significant hepatic or renal failure  Viral panel negative for tested pathogens    At this point I suspect patient has postviral bacterial pneumonia.  Will treat with IV Zosyn.        DIAGNOSIS  Final diagnoses:   Pneumonia of left lower lobe due to infectious organism   COPD with acute exacerbation   Chronic diastolic CHF (congestive heart failure)         DISPOSITION  ED Disposition       ED Disposition   Decision to Admit    Condition   --    Comment   Level of Care: Telemetry [5]   Diagnosis: Pneumonia [001231]   Admitting Physician: VERO HERRMANN [[id number]]   Attending Physician: VERO HERRMANN [[id number]]   Certification: I Certify That Inpatient Hospital Services Are Medically Necessary For Greater Than 2 Midnights                  Please note that portions of this document were completed with a voice recognition program.    Note Disclaimer: At [facility], we believe that sharing information builds trust and better relationships. You are receiving this note because you recently visited [facility]. It is possible you will see health information before a provider has talked with you about it. This kind of information can be easy to  misunderstand. To help you fully understand what it means for your health, we urge you to discuss this note with your provider.         Jered Grijalva MD  02/26/25 0192

## 2025-02-27 NOTE — H&P
Patient Name:  Tony Leonard  YOB: 1940  MRN:  [id number]  Admit Date:  2/26/2025  Patient Care Team:  Alfonso Goldstein MD as PCP - General (Family Medicine)  Katharina Salter MD as Consulting Physician (Pulmonary Disease)  Anum Bhatt MD as Consulting Physician (Pain Medicine)      Subjective   History Present Illness     Chief Complaint   Patient presents with    Shortness of Breath     This is an 84-year-old male with a past medical history of COPD, chronic diastolic heart failure, paroxysmal atrial fibrillation, hypertension presented to hospital with acute onset shortness of breath, cough, fever.  Chest x-ray was obtained which showed a patchy left lung base infiltrate consistent with pneumonia.  White blood cell count is elevated to 17.8 RVP was negative,  and he was started on Rocephin.    Personal History     Past Medical History:   Diagnosis Date    ADHD (attention deficit hyperactivity disorder)     Altered mental status, unspecified     Anemia, unspecified     Attention-deficit hyperactivity disorder, unspecified type     Benign prostatic hyperplasia without lower urinary tract symptoms     Body mass index 26.0-26.9, adult     Bronchiectasis     Bronchiectasis, uncomplicated     Chest pain, unspecified     CHF (congestive heart failure)     Chronic diastolic (congestive) heart failure     Chronic respiratory failure with hypoxia     Cognitive communication deficit     Colon polyp     COPD (chronic obstructive pulmonary disease)     Depression, unspecified     Diaphragmatic hernia without obstruction or gangrene     Diverticulosis     Diverticulosis of both small and large intestine without perforation or abscess without bleeding     Dorsalgia, unspecified     Encounter for immunization     Eosinophilic asthma     Essential (primary) hypertension     Gastroesophageal reflux disease without esophagitis     Hard of hearing     Hyperlipidemia, unspecified     Hypertensive  heart disease with congestive heart failure     Hypoxemia     Influenza due to other identified influenza virus with other respiratory manifestations     Insomnia, unspecified     Localized edema     Mild protein-calorie malnutrition     Muscle weakness (generalized)     On home oxygen therapy     2 LITERS    Other specified abnormal findings of blood chemistry     Overweight     Paroxysmal atrial fibrillation     Personal history of malignant neoplasm of prostate     Pneumonia     Polyneuropathy, unspecified     Prostate cancer 04/22/2019    Restless leg syndrome     Sleep related hypoventilation in conditions classified elsewhere     Spinal stenosis, lumbar region with neurogenic claudication 08/02/2022    Spinal stenosis, lumbar region, with neurogenic claudication     Weakness      Past Surgical History:   Procedure Laterality Date    BRONCHOSCOPY N/A 04/30/2016    Procedure: BRONCHOSCOPY with BAL of right lower lobe and left  lower lobe.  ;  Surgeon: Nando Diaz MD;  Location: [facility] ENDOSCOPY;  Service:     BRONCHOSCOPY N/A 04/28/2017    Procedure: BRONCHOSCOPY;  Surgeon: Katharina Salter MD;  Location: [facility] ENDOSCOPY;  Service:     BRONCHOSCOPY Bilateral 06/29/2017    Procedure: BRONCHOSCOPY WITH WASHINGS;  Surgeon: Katharina Salter MD;  Location: [facility] ENDOSCOPY;  Service:     BRONCHOSCOPY N/A 01/22/2018    Procedure: BRONCHOSCOPY AT BEDSIDE with BAL;  Surgeon: Katharina Salter MD;  Location: [facility] ENDOSCOPY;  Service:     BRONCHOSCOPY N/A 01/30/2018    Procedure: BRONCHOSCOPY with BAL and washing;  Surgeon: Shreyas Wild MD;  Location: [facility] ENDOSCOPY;  Service:     BRONCHOSCOPY Bilateral 04/24/2018    Procedure: BRONCHOSCOPY WITH WASHING;  Surgeon: Katharina Salter MD;  Location: [facility] ENDOSCOPY;  Service: Pulmonary    BRONCHOSCOPY N/A 12/28/2019    Procedure: BRONCHOSCOPY with bilateral washing;  Surgeon: Katharina Salter MD;  Location: [facility] ENDOSCOPY;  Service: Pulmonary    BRONCHOSCOPY Bilateral  01/04/2023    Procedure: BRONCHOSCOPY with lavage;  Surgeon: Katharina Salter MD;  Location: [facility] ENDOSCOPY;  Service: Pulmonary;  Laterality: Bilateral;  mucus plugging    CARDIAC CATHETERIZATION N/A 01/29/2023    Procedure: Left Heart Cath;  Surgeon: Johnnie Ang MD;  Location: [facility] CATH [facility]VASIVE LOC[facility];  Service: Cardiology;  Laterality: N/A;    CARDIAC CATHETERIZATION N/A 01/29/2023    Procedure: Coronary angiography;  Surgeon: Johnnie Ang MD;  Location: [facility] LOC[facility];  Service: Cardiology;  Laterality: N/A;    COLONOSCOPY  05/17/2013    eh, ih, tort, sig tics    COLONOSCOPY N/A 05/10/2019    Non-thrombosed external hemorrhoids found on perianal exam, Diverticulosis, Tortuous colon, One 5 mm polyp in the mid ascending colon, IH. Path: Tubular adenoma.     COLONOSCOPY N/A 09/24/2022    Procedure: COLONOSCOPY to cecum and TI with argon plasma coagulation.;  Surgeon: Bruce Black MD;  Location: [facility] ENDOSCOPY;  Service: Gastroenterology;  Laterality: N/A;  pre- GI bleeding  post- radiation proctitis, diverticulosis    ENDOSCOPY  03/19/2015    z line irreg, hh    ENDOSCOPY N/A 09/24/2022    Procedure: ESOPHAGOGASTRODUODENOSCOPY;  Surgeon: Bruce Black MD;  Location: [facility] ENDOSCOPY;  Service: Gastroenterology;  Laterality: N/A;  pre- GI bleeding  post- esophagitis, hiatal hernia    HIP OPEN REDUCTION Right 01/17/2018    Procedure: HIP OPEN REDUCTION INTERNAL FIXATION WITH DYNAMIC HIP SCREW;  Surgeon: Les Black MD;  Location: [facility] MAIN OR;  Service:     SPINE SURGERY      TONSILLECTOMY      TOTAL HIP ARTHROPLASTY REVISION Right 09/23/2019    Procedure: HIP  REVISION RIGHT;  Surgeon: Isauro Warner MD;  Location: [facility] MAIN OR;  Service: Orthopedics     Family History   Problem Relation Age of Onset    Thyroid disease Mother     No Known Problems Father     Malig Hyperthermia Neg Hx      Social History     Tobacco Use    Smoking status: Never     Smokeless tobacco: Never   Vaping Use    Vaping status: Never Used   Substance Use Topics    Alcohol use: No     Comment: red wine occassional    Drug use: No     No current facility-administered medications on file prior to encounter.     Current Outpatient Medications on File Prior to Encounter   Medication Sig Dispense Refill    albuterol (PROVENTIL) (2.5 MG/3ML) 0.083% nebulizer solution Take 2.5 mg by nebulization Every 6 (Six) Hours As Needed for Wheezing. 360 mL 3    dimethicone (AVEENO) 1.3 % lotion lotion Apply 1 Application topically to the appropriate area as directed Every 12 (Twelve) Hours As Needed.      ferrous sulfate 325 (65 FE) MG tablet Take 1 tablet by mouth Daily With Breakfast.      FLUoxetine (PROzac) 20 MG capsule TAKE 1 CAPSULE DAILY 90 capsule 3    Fluticasone-Umeclidin-Vilant (Trelegy Ellipta) 100-62.5-25 MCG/ACT inhaler Inhale 1 puff Daily.      furosemide (LASIX) 20 MG tablet Take 1 tablet by mouth 2 (Two) Times a Day.      gabapentin (NEURONTIN) 300 MG capsule Take 1 capsule by mouth 3 (Three) Times a Day. 6 capsule 0    guaiFENesin (MUCINEX) 600 MG 12 hr tablet Take 2 tablets by mouth 2 (Two) Times a Day.      Heat Wraps (ThermaCare Back Pain Therapy) misc Use 1 Pad Daily.      Melatonin 3 MG capsule Take 1 capsule by mouth As Needed.      menthol-zinc oxide (CALMOSEPTINE) 0.44-20.625 % ointment ointment Apply 1 Application topically to the appropriate area as directed Every 12 (Twelve) Hours.      multivitamin with minerals (Multivitamin Adult) tablet tablet Take 1 tablet by mouth Daily. 30 tablet 11    pantoprazole (PROTONIX) 40 MG EC tablet Take 1 tablet by mouth Daily. 90 tablet 3    potassium chloride (KLOR-CON) 20 MEQ packet Take 20 mEq by mouth Daily.      sucralfate (Carafate) 1 g tablet Take 1 tablet by mouth 4 (Four) Times a Day. 120 tablet 0    acetaminophen (TYLENOL) 325 MG tablet Take 2 tablets by mouth Every 4 (Four) Hours As Needed for Mild Pain.      azithromycin  (ZITHROMAX) 250 MG tablet Indications: Worsening of Chronic Obstructive Lung Disease 4 tablet 0    calcium carbonate (TUMS) 500 MG chewable tablet Chew 2 tablets Every 4 (Four) Hours As Needed for Indigestion or Heartburn.      dicyclomine (BENTYL) 10 MG capsule Take 1 capsule by mouth 3 (Three) Times a Day As Needed (abdominal bloating). 30 capsule 0    ondansetron ODT (ZOFRAN-ODT) 4 MG disintegrating tablet Place 1 tablet on the tongue Every 6 (Six) Hours As Needed for Nausea or Vomiting.      sodium chloride 7 % nebulizer solution nebulizer solution Take 4 mL by nebulization Every 4 (Four) Hours As Needed (pt takes as needed at home).      tiZANidine (ZANAFLEX) 2 MG tablet Take 1 tablet by mouth Daily As Needed for Muscle Spasms.      tuberculin (Aplisol) 5 UNIT/0.1ML injection Inject 0.1 mL into the appropriate area of the skin as directed by provider 1 (One) Time.       Allergies   Allergen Reactions    Daliresp [Roflumilast] Anaphylaxis    Latex Rash    Sulfa Antibiotics Rash       Objective    Objective     Vital Signs  Temp:  [99.5 °F (37.5 °C)-101 °F (38.3 °C)] 99.5 °F (37.5 °C)  Heart Rate:  [] 102  Resp:  [18-20] 18  BP: (100-117)/(57-73) 111/57  SpO2:  [91 %-96 %] 94 %  on  Flow (L/min) (Oxygen Therapy):  [2-4] 4;   Device (Oxygen Therapy): nasal cannula  Body mass index is 22.59 kg/m².    Physical Exam  Constitutional:       General: He is not in acute distress.     Appearance: He is ill-appearing.   HENT:      Head: Normocephalic and atraumatic.   Eyes:      Extraocular Movements: Extraocular movements intact.      Pupils: Pupils are equal, round, and reactive to light.   Cardiovascular:      Rate and Rhythm: Normal rate and regular rhythm.   Pulmonary:      Effort: Pulmonary effort is normal. Respiratory distress present.   Abdominal:      General: There is no distension.      Palpations: Abdomen is soft.      Tenderness: There is no abdominal tenderness.   Skin:     General: Skin is warm and  dry.   Neurological:      General: No focal deficit present.      Mental Status: He is alert and oriented to person, place, and time.         Results Review:  I reviewed the patient's new clinical results.  I reviewed the patient's new imaging results and agree with the interpretation.  I reviewed the patient's other test results and agree with the interpretation  I personally viewed and interpreted the patient's EKG/Telemetry data  Discussed with ED provider.    Lab Results (last 24 hours)       Procedure Component Value Units Date/Time    Respiratory Panel PCR w/COVID-19(SARS-CoV-2) JARRETT/AMPARO/ROYAL/PAD/COR/JERMAIN In-House, NP Swab in UTM/VTM, 2 HR TAT - Swab, Nasopharynx [[id number]]  (Normal) Collected: 02/26/25 1941    Specimen: Swab from Nasopharynx Updated: 02/26/25 2219     ADENOVIRUS, PCR Not Detected     Coronavirus 229E Not Detected     Coronavirus HKU1 Not Detected     Coronavirus NL63 Not Detected     Coronavirus OC43 Not Detected     COVID19 Not Detected     Human Metapneumovirus Not Detected     Human Rhinovirus/Enterovirus Not Detected     Influenza A PCR Not Detected     Influenza B PCR Not Detected     Parainfluenza Virus 1 Not Detected     Parainfluenza Virus 2 Not Detected     Parainfluenza Virus 3 Not Detected     Parainfluenza Virus 4 Not Detected     RSV, PCR Not Detected     Bordetella pertussis pcr Not Detected     Bordetella parapertussis PCR Not Detected     Chlamydophila pneumoniae PCR Not Detected     Mycoplasma pneumo by PCR Not Detected    Narrative:      In the setting of a positive respiratory panel with a viral infection PLUS a negative procalcitonin without other underlying concern for bacterial infection, consider observing off antibiotics or discontinuation of antibiotics and continue supportive care. If the respiratory panel is positive for atypical bacterial infection (Bordetella pertussis, Chlamydophila pneumoniae, or Mycoplasma pneumoniae), consider antibiotic de-escalation to target  "atypical bacterial infection.    Lactic Acid, Plasma [[id number]]  (Normal) Collected: 02/26/25 2006    Specimen: Blood Updated: 02/26/25 2034     Lactate 1.4 mmol/L     Procalcitonin [[id number]]  (Abnormal) Collected: 02/26/25 2006    Specimen: Blood Updated: 02/26/25 2049     Procalcitonin 0.47 ng/mL     Narrative:      As a Marker for Sepsis (Non-Neonates):    1. <0.5 ng/mL represents a low risk of severe sepsis and/or septic shock.  2. >2 ng/mL represents a high risk of severe sepsis and/or septic shock.    As a Marker for Lower Respiratory Tract Infections that require antibiotic therapy:    PCT on Admission    Antibiotic Therapy       6-12 Hrs later    >0.5                Strongly Recommended  >0.25 - <0.5        Recommended   0.1 - 0.25          Discouraged              Remeasure/reassess PCT  <0.1                Strongly Discouraged     Remeasure/reassess PCT    As 28 day mortality risk marker: \"Change in Procalcitonin Result\" (>80% or <=80%) if Day 0 (or Day 1) and Day 4 values are available. Refer to http://www.[employer]-pct-calculator.com    Change in PCT <=80%  A decrease of PCT levels below or equal to 80% defines a positive change in PCT test result representing a higher risk for 28-day all-cause mortality of patients diagnosed with severe sepsis for septic shock.    Change in PCT >80%  A decrease of PCT levels of more than 80% defines a negative change in PCT result representing a lower risk for 28-day all-cause mortality of patients diagnosed with severe sepsis or septic shock.       High Sensitivity Troponin T [[id number]]  (Abnormal) Collected: 02/26/25 2006    Specimen: Blood Updated: 02/26/25 2049     HS Troponin T 34 ng/L     Narrative:      High Sensitive Troponin T Reference Range:  <14.0 ng/L- Negative Female for AMI  <22.0 ng/L- Negative Male for AMI  >=14 - Abnormal Female indicating possible myocardial injury.  >=22 - Abnormal Male indicating possible myocardial injury.   Clinicians would have " to utilize clinical acumen, EKG, Troponin, and serial changes to determine if it is an Acute Myocardial Infarction or myocardial injury due to an underlying chronic condition.         BNP [[id number]]  (Normal) Collected: 02/26/25 2006    Specimen: Blood Updated: 02/26/25 2049     proBNP 200.0 pg/mL     Narrative:      This assay is used as an aid in the diagnosis of individuals suspected of having heart failure. It can be used as an aid in the diagnosis of acute decompensated heart failure (ADHF) in patients presenting with signs and symptoms of ADHF to the emergency department (ED). In addition, NT-proBNP of <300 pg/mL indicates ADHF is not likely.    Age Range Result Interpretation  NT-proBNP Concentration (pg/mL:      <50             Positive            >450                   Gray                 300-450                    Negative             <300    50-75           Positive            >900                  Gray                300-900                  Negative            <300      >75             Positive            >1800                  Gray                300-1800                  Negative            <300    Blood Culture - Blood, Arm, Right [[id number]] Collected: 02/26/25 2050    Specimen: Blood from Arm, Right Updated: 02/26/25 2054    Blood Culture - Blood, Arm, Left [[id number]] Collected: 02/26/25 2100    Specimen: Blood from Arm, Left Updated: 02/26/25 2105    High Sensitivity Troponin T 1Hr [[id number]]  (Abnormal) Collected: 02/26/25 2100    Specimen: Blood from Arm, Left Updated: 02/26/25 2131     HS Troponin T 32 ng/L      Troponin T Numeric Delta -2 ng/L      Troponin T % Delta -6    Narrative:      High Sensitive Troponin T Reference Range:  <14.0 ng/L- Negative Female for AMI  <22.0 ng/L- Negative Male for AMI  >=14 - Abnormal Female indicating possible myocardial injury.  >=22 - Abnormal Male indicating possible myocardial injury.   Clinicians would have to utilize clinical acumen, EKG, Troponin,  and serial changes to determine if it is an Acute Myocardial Infarction or myocardial injury due to an underlying chronic condition.         Comprehensive Metabolic Panel [[id number]]  (Abnormal) Collected: 02/26/25 2100    Specimen: Blood from Arm, Left Updated: 02/26/25 2245     Glucose 109 mg/dL      BUN 19 mg/dL      Creatinine 1.10 mg/dL      Sodium 136 mmol/L      Potassium 3.7 mmol/L      Chloride 101 mmol/L      CO2 23.7 mmol/L      Calcium 8.5 mg/dL      Total Protein 5.8 g/dL      Albumin 2.3 g/dL      ALT (SGPT) 20 U/L      AST (SGOT) 16 U/L      Alkaline Phosphatase 65 U/L      Total Bilirubin 1.0 mg/dL      Globulin 3.5 gm/dL      A/G Ratio 0.7 g/dL      BUN/Creatinine Ratio 17.3     Anion Gap 11.3 mmol/L      eGFR 65.4 mL/min/1.73     Narrative:      GFR Categories in Chronic Kidney Disease (CKD)      GFR Category          GFR (mL/min/1.73)    Interpretation  G1                     90 or greater         Normal or high (1)  G2                      60-89                Mild decrease (1)  G3a                   45-59                Mild to moderate decrease  G3b                   30-44                Moderate to severe decrease  G4                    15-29                Severe decrease  G5                    14 or less           Kidney failure          (1)In the absence of evidence of kidney disease, neither GFR category G1 or G2 fulfill the criteria for CKD.    eGFR calculation 2021 CKD-EPI creatinine equation, which does not include race as a factor    CBC & Differential [[id number]]  (Abnormal) Collected: 02/26/25 2202    Specimen: Blood Updated: 02/26/25 2214    Narrative:      The following orders were created for panel order CBC & Differential.  Procedure                               Abnormality         Status                     ---------                               -----------         ------                     CBC Auto Differential[[id number]]        Abnormal            Final result                  Please view results for these tests on the individual orders.    CBC Auto Differential [[id number]]  (Abnormal) Collected: 02/26/25 2202    Specimen: Blood Updated: 02/26/25 2214     WBC 17.83 10*3/mm3      RBC 4.00 10*6/mm3      Hemoglobin 12.3 g/dL      Hematocrit 36.2 %      MCV 90.5 fL      MCH 30.8 pg      MCHC 34.0 g/dL      RDW 16.5 %      RDW-SD 55.1 fl      MPV 8.9 fL      Platelets 301 10*3/mm3      Neutrophil % 83.9 %      Lymphocyte % 7.0 %      Monocyte % 8.2 %      Eosinophil % 0.0 %      Basophil % 0.1 %      Immature Grans % 0.8 %      Neutrophils, Absolute 14.95 10*3/mm3      Lymphocytes, Absolute 1.24 10*3/mm3      Monocytes, Absolute 1.47 10*3/mm3      Eosinophils, Absolute 0.00 10*3/mm3      Basophils, Absolute 0.02 10*3/mm3      Immature Grans, Absolute 0.15 10*3/mm3      nRBC 0.0 /100 WBC     MRSA Screen, PCR (Inpatient) - Swab, Nares [[id number]]  (Normal) Collected: 02/27/25 0609    Specimen: Swab from Nares Updated: 02/27/25 0745     MRSA PCR No MRSA Detected    Narrative:      The negative predictive value of this diagnostic test is high and should only be used to consider de-escalating anti-MRSA therapy. A positive result may indicate colonization with MRSA and must be correlated clinically.    Basic Metabolic Panel [[id number]]  (Abnormal) Collected: 02/27/25 0610    Specimen: Blood from Arm, Left Updated: 02/27/25 0707     Glucose 91 mg/dL      BUN 21 mg/dL      Creatinine 1.16 mg/dL      Sodium 136 mmol/L      Potassium 3.9 mmol/L      Chloride 100 mmol/L      CO2 24.0 mmol/L      Calcium 8.3 mg/dL      BUN/Creatinine Ratio 18.1     Anion Gap 12.0 mmol/L      eGFR 61.3 mL/min/1.73     Narrative:      GFR Categories in Chronic Kidney Disease (CKD)      GFR Category          GFR (mL/min/1.73)    Interpretation  G1                     90 or greater         Normal or high (1)  G2                      60-89                Mild decrease (1)  G3a                   45-59                Mild to  moderate decrease  G3b                   30-44                Moderate to severe decrease  G4                    15-29                Severe decrease  G5                    14 or less           Kidney failure          (1)In the absence of evidence of kidney disease, neither GFR category G1 or G2 fulfill the criteria for CKD.    eGFR calculation 2021 CKD-EPI creatinine equation, which does not include race as a factor    CBC (No Diff) [[id number]]  (Abnormal) Collected: 02/27/25 0610    Specimen: Blood from Arm, Left Updated: 02/27/25 0633     WBC 15.69 10*3/mm3      RBC 3.99 10*6/mm3      Hemoglobin 12.2 g/dL      Hematocrit 36.2 %      MCV 90.7 fL      MCH 30.6 pg      MCHC 33.7 g/dL      RDW 16.2 %      RDW-SD 54.5 fl      MPV 8.7 fL      Platelets 288 10*3/mm3             Results for orders placed or performed during the hospital encounter of 05/21/24   Blood Culture - Blood, Arm, Left    Specimen: Arm, Left; Blood   Result Value Ref Range    Blood Culture No growth at 5 days        Imaging Results (Last 24 Hours)       Procedure Component Value Units Date/Time    XR Chest 1 View [[id number]] Collected: 02/26/25 1943     Updated: 02/26/25 1947    Narrative:      AP CHEST     HISTORY: Shortness of breath, cough and fever     COMPARISON: 2/16/2025     FINDINGS: There is patchy airspace infiltrate in the left lung base most  consistent with pneumonia. There may be a small left pleural effusion.  Heart size normal. Postsurgical changes of the cervical spine       Impression:      Patchy left lung base infiltrate most consistent with pneumonia. There  may be a small left pleural effusion. Follow-up to clearing is  recommended           This report was finalized on 2/26/2025 7:44 PM by Dr. Ricardo Wu M.D on Workstation: [id number]               Results for orders placed during the hospital encounter of 12/21/23    Adult Transthoracic Echo Complete W/ Cont if Necessary Per Protocol    Interpretation Summary     Left ventricular ejection fraction appears to be 56 - 60%.    Left ventricular diastolic function was normal.    Moderate dilation of the aortic root is present. Mild dilation of the sinuses of Valsalva is present.      ECG 12 Lead Dyspnea   Preliminary Result   HEART IRTX=350  bpm   RR Osemnqcw=739  ms   IA Qiswkgcs=669  ms   P Horizontal Axis=-14  deg   P Front Axis=42  deg   QRSD Interval=77  ms   QT Luaqqixj=526  ms   YCvB=416  ms   QRS Axis=-20  deg   T Wave Axis=48  deg   - BORDERLINE ECG -   Sinus tachycardia   Atrial premature complex   Borderline left axis deviation   Low voltage, precordial leads   Date and Time of Study:2025-02-26 19:51:43      Telemetry Scan   Final Result      Telemetry Scan   Final Result                 Assessment/Plan     Active Hospital Problems    Diagnosis  POA    **Pneumonia [J18.9]  Yes    HTN (hypertension) [I10]  Yes    Chronic diastolic CHF (congestive heart failure) [I50.32]  Yes    COPD (chronic obstructive pulmonary disease) [J44.9]  Yes    GERD (gastroesophageal reflux disease) [K21.9]  Yes      Resolved Hospital Problems   No resolved problems to display.     Pneumonia  Lactic acid wnl. Procal 0.47  Follow cultures. RVP negative. Continue zosyn  He follows with Dr. Bob and would like LPC to see in consultation  Obtain CT Angiogram chest     COPD   Resume home inhalers    GERD  PPI    Hypertension  Blood pressures acceptable     Chronic diastolic heart failure  EF 56-60%  Hold lasix for now.           I discussed the patient's findings and my recommendations with patient and ED provider.    VTE Prophylaxis - SCDs.  Code Status - Full code.       Alberto Tomlinson MD  [facility]  02/27/25  10:04 EST

## 2025-02-27 NOTE — ED NOTES
Nursing report ED to floor  Tony Leonard  84 y.o.  male    HPI :  HPI  Stated Reason for Visit: Shortness of air  History Obtained From: EMS  Duration (Weeks): 2    Chief Complaint  Chief Complaint   Patient presents with    Shortness of Breath       Admitting doctor:   Alberto Tomlinson MD    Admitting diagnosis:   The primary encounter diagnosis was Pneumonia of left lower lobe due to infectious organism. Diagnoses of COPD with acute exacerbation and Chronic diastolic CHF (congestive heart failure) were also pertinent to this visit.    Code status:   Current Code Status       Date Active Code Status Order ID Comments User Context       2/27/2025 0020 CPR (Attempt to Resuscitate) [id number]  Melia Ontiveros, APRN ED        Question Answer    Code Status (Patient has no pulse and is not breathing) CPR (Attempt to Resuscitate)    Medical Interventions (Patient has pulse or is breathing) Full                    Allergies:   Daliresp [roflumilast], Latex, and Sulfa antibiotics    Isolation:   Droplet    Intake and Output    Intake/Output Summary (Last 24 hours) at 2/27/2025 1727  Last data filed at 2/27/2025 1017  Gross per 24 hour   Intake 200 ml   Output --   Net 200 ml       Weight:       02/27/25  0056   Weight: 73.5 kg (162 lb)       Most recent vitals:   Vitals:    02/27/25 1055 02/27/25 1201 02/27/25 1401 02/27/25 1512   BP:  105/90 118/66    Patient Position:  Lying Lying    Pulse: 111 111 112 116   Resp:    18   Temp:       TempSrc:       SpO2: 97% 95% 99% 94%   Weight:       Height:           Active LDAs/IV Access:   Lines, Drains & Airways       Active LDAs       Name Placement date Placement time Site Days    Peripheral IV 02/27/25 1540 Anterior;Left;Upper Arm 02/27/25  1540  Arm  less than 1    External Urinary Catheter 02/27/25  0525  --  less than 1                    Labs (abnormal labs have a star):   Labs Reviewed   PROCALCITONIN - Abnormal; Notable for the following components:       Result Value     "Procalcitonin 0.47 (*)     All other components within normal limits    Narrative:     As a Marker for Sepsis (Non-Neonates):    1. <0.5 ng/mL represents a low risk of severe sepsis and/or septic shock.  2. >2 ng/mL represents a high risk of severe sepsis and/or septic shock.    As a Marker for Lower Respiratory Tract Infections that require antibiotic therapy:    PCT on Admission    Antibiotic Therapy       6-12 Hrs later    >0.5                Strongly Recommended  >0.25 - <0.5        Recommended   0.1 - 0.25          Discouraged              Remeasure/reassess PCT  <0.1                Strongly Discouraged     Remeasure/reassess PCT    As 28 day mortality risk marker: \"Change in Procalcitonin Result\" (>80% or <=80%) if Day 0 (or Day 1) and Day 4 values are available. Refer to http://www.[email or web address] [email or web address] [email or web address]-pct-calculator.[email or web address]    Change in PCT <=80%  A decrease of PCT levels below or equal to 80% defines a positive change in PCT test result representing a higher risk for 28-day all-cause mortality of patients diagnosed with severe sepsis for septic shock.    Change in PCT >80%  A decrease of PCT levels of more than 80% defines a negative change in PCT result representing a lower risk for 28-day all-cause mortality of patients diagnosed with severe sepsis or septic shock.      TROPONIN - Abnormal; Notable for the following components:    HS Troponin T 34 (*)     All other components within normal limits    Narrative:     High Sensitive Troponin T Reference Range:  <14.0 ng/L- Negative Female for AMI  <22.0 ng/L- Negative Male for AMI  >=14 - Abnormal Female indicating possible myocardial injury.  >=22 - Abnormal Male indicating possible myocardial injury.   Clinicians would have to utilize clinical acumen, EKG, Troponin, and serial changes to determine if it is an Acute Myocardial Infarction or myocardial injury due to an underlying chronic condition.        HIGH SENSITIVITIY TROPONIN T 1HR - Abnormal; Notable for the following " components:    HS Troponin T 32 (*)     All other components within normal limits    Narrative:     High Sensitive Troponin T Reference Range:  <14.0 ng/L- Negative Female for AMI  <22.0 ng/L- Negative Male for AMI  >=14 - Abnormal Female indicating possible myocardial injury.  >=22 - Abnormal Male indicating possible myocardial injury.   Clinicians would have to utilize clinical acumen, EKG, Troponin, and serial changes to determine if it is an Acute Myocardial Infarction or myocardial injury due to an underlying chronic condition.        COMPREHENSIVE METABOLIC PANEL - Abnormal; Notable for the following components:    Glucose 109 (*)     Calcium 8.5 (*)     Total Protein 5.8 (*)     Albumin 2.3 (*)     All other components within normal limits    Narrative:     GFR Categories in Chronic Kidney Disease (CKD)      GFR Category          GFR (mL/min/1.73)    Interpretation  G1                     90 or greater         Normal or high (1)  G2                      60-89                Mild decrease (1)  G3a                   45-59                Mild to moderate decrease  G3b                   30-44                Moderate to severe decrease  G4                    15-29                Severe decrease  G5                    14 or less           Kidney failure          (1)In the absence of evidence of kidney disease, neither GFR category G1 or G2 fulfill the criteria for CKD.    eGFR calculation 2021 CKD-EPI creatinine equation, which does not include race as a factor   CBC WITH AUTO DIFFERENTIAL - Abnormal; Notable for the following components:    WBC 17.83 (*)     RBC 4.00 (*)     Hemoglobin 12.3 (*)     Hematocrit 36.2 (*)     RDW 16.5 (*)     RDW-SD 55.1 (*)     Neutrophil % 83.9 (*)     Lymphocyte % 7.0 (*)     Eosinophil % 0.0 (*)     Immature Grans % 0.8 (*)     Neutrophils, Absolute 14.95 (*)     Monocytes, Absolute 1.47 (*)     Immature Grans, Absolute 0.15 (*)     All other components within normal limits    BASIC METABOLIC PANEL - Abnormal; Notable for the following components:    Calcium 8.3 (*)     All other components within normal limits    Narrative:     GFR Categories in Chronic Kidney Disease (CKD)      GFR Category          GFR (mL/min/1.73)    Interpretation  G1                     90 or greater         Normal or high (1)  G2                      60-89                Mild decrease (1)  G3a                   45-59                Mild to moderate decrease  G3b                   30-44                Moderate to severe decrease  G4                    15-29                Severe decrease  G5                    14 or less           Kidney failure          (1)In the absence of evidence of kidney disease, neither GFR category G1 or G2 fulfill the criteria for CKD.    eGFR calculation 2021 CKD-EPI creatinine equation, which does not include race as a factor   CBC (NO DIFF) - Abnormal; Notable for the following components:    WBC 15.69 (*)     RBC 3.99 (*)     Hemoglobin 12.2 (*)     Hematocrit 36.2 (*)     RDW 16.2 (*)     RDW-SD 54.5 (*)     All other components within normal limits   RESPIRATORY PANEL PCR W/ COVID-19 (SARS-COV-2), NP SWAB IN UTM/VTP, 2 HR TAT - Normal    Narrative:     In the setting of a positive respiratory panel with a viral infection PLUS a negative procalcitonin without other underlying concern for bacterial infection, consider observing off antibiotics or discontinuation of antibiotics and continue supportive care. If the respiratory panel is positive for atypical bacterial infection (Bordetella pertussis, Chlamydophila pneumoniae, or Mycoplasma pneumoniae), consider antibiotic de-escalation to target atypical bacterial infection.   LEGIONELLA ANTIGEN, URINE - Normal   STREP PNEUMO AG, URINE OR CSF - Normal   MRSA SCREEN, PCR - Normal    Narrative:     The negative predictive value of this diagnostic test is high and should only be used to consider de-escalating anti-MRSA therapy. A positive  result may indicate colonization with MRSA and must be correlated clinically.   LACTIC ACID, PLASMA - Normal   BNP (IN-HOUSE) - Normal    Narrative:     This assay is used as an aid in the diagnosis of individuals suspected of having heart failure. It can be used as an aid in the diagnosis of acute decompensated heart failure (ADHF) in patients presenting with signs and symptoms of ADHF to the emergency department (ED). In addition, NT-proBNP of <300 pg/mL indicates ADHF is not likely.    Age Range Result Interpretation  NT-proBNP Concentration (pg/mL:      <50             Positive            >450                   Gray                 300-450                    Negative             <300    50-75           Positive            >900                  Gray                300-900                  Negative            <300      >75             Positive            >1800                  Gray                300-1800                  Negative            <300   BLOOD CULTURE   BLOOD CULTURE   CBC AND DIFFERENTIAL    Narrative:     The following orders were created for panel order CBC & Differential.  Procedure                               Abnormality         Status                     ---------                               -----------         ------                     CBC Auto Differential[[id number]]        Abnormal            Final result                 Please view results for these tests on the individual orders.       EKG:   ECG 12 Lead Dyspnea   Preliminary Result   HEART UPVY=300  bpm   RR Dmyfpfkl=659  ms   SD Uulzfosp=520  ms   P Horizontal Axis=-14  deg   P Front Axis=42  deg   QRSD Interval=77  ms   QT Nsjxngyh=718  ms   STrW=130  ms   QRS Axis=-20  deg   T Wave Axis=48  deg   - BORDERLINE ECG -   Sinus tachycardia   Atrial premature complex   Borderline left axis deviation   Low voltage, precordial leads   Date and Time of Study:2025-02-26 19:51:43      Telemetry Scan   Final Result      Telemetry Scan   Final  Result          Meds given in ED:   Medications   nitroglycerin (NITROSTAT) SL tablet 0.4 mg (has no administration in time range)   acetaminophen (TYLENOL) tablet 650 mg (has no administration in time range)   sennosides-docusate (PERICOLACE) 8.6-50 MG per tablet 2 tablet (has no administration in time range)     And   polyethylene glycol (MIRALAX) packet 17 g (has no administration in time range)     And   bisacodyl (DULCOLAX) EC tablet 5 mg (has no administration in time range)     And   bisacodyl (DULCOLAX) suppository 10 mg (has no administration in time range)   ondansetron ODT (ZOFRAN-ODT) disintegrating tablet 4 mg (has no administration in time range)     Or   ondansetron (ZOFRAN) injection 4 mg (has no administration in time range)   ipratropium-albuterol (DUO-NEB) nebulizer solution 3 mL (3 mL Nebulization Given 2/27/25 1512)   ipratropium-albuterol (DUO-NEB) nebulizer solution 3 mL (has no administration in time range)   piperacillin-tazobactam (ZOSYN) 3.375 g IVPB in 100 mL NS MBP (CD) (3.375 g Intravenous New Bag 2/27/25 1257)   gabapentin (NEURONTIN) capsule 300 mg (300 mg Oral Given 2/27/25 1526)   albuterol (PROVENTIL) nebulizer solution 0.083% 2.5 mg/3mL (has no administration in time range)   dicyclomine (BENTYL) capsule 10 mg (has no administration in time range)   FLUoxetine (PROzac) capsule 20 mg (20 mg Oral Given 2/27/25 1145)   arformoterol (BROVANA) nebulizer solution 15 mcg (15 mcg Nebulization Not Given 2/27/25 1008)     And   budesonide (PULMICORT) nebulizer solution 0.5 mg (0.5 mg Nebulization Given 2/27/25 1054)     And   revefenacin (YUPELRI) nebulizer solution 175 mcg (175 mcg Nebulization Not Given 2/27/25 1008)   guaiFENesin (MUCINEX) 12 hr tablet 1,200 mg (1,200 mg Oral Given 2/27/25 1144)   melatonin tablet 2.5 mg (has no administration in time range)   pantoprazole (PROTONIX) EC tablet 40 mg (40 mg Oral Given 2/27/25 1147)   sodium chloride 7 % nebulizer solution nebulizer  solution 4 mL (has no administration in time range)   sucralfate (CARAFATE) tablet 1 g (1 g Oral Given 2/27/25 1146)   azithromycin (ZITHROMAX) tablet 250 mg (has no administration in time range)   acetaminophen (TYLENOL) tablet 1,000 mg (1,000 mg Oral Given 2/26/25 1944)   piperacillin-tazobactam (ZOSYN) 3.375 g IVPB in 100 mL NS MBP (CD) (0 g Intravenous Stopped 2/26/25 2330)       Imaging results:  XR Chest 1 View    Result Date: 2/26/2025  Patchy left lung base infiltrate most consistent with pneumonia. There may be a small left pleural effusion. Follow-up to clearing is recommended    This report was finalized on 2/26/2025 7:44 PM by Dr. Ricardo Wu M.D on Workstation: [id number]       Ambulatory status:   - x2 assist, pt consult in    Social issues:   Social History     Socioeconomic History    Marital status: Single   Tobacco Use    Smoking status: Never    Smokeless tobacco: Never   Vaping Use    Vaping status: Never Used   Substance and Sexual Activity    Alcohol use: No     Comment: red wine occassional    Drug use: No    Sexual activity: Defer       Peripheral Neurovascular  Peripheral Neurovascular (Adult)  Peripheral Neurovascular WDL: WDL    Neuro Cognitive  Neuro Cognitive (Adult)  Cognitive/Neuro/Behavioral WDL: WDL    Learning  Learning Assessment  Learning Readiness and Ability: no barriers identified  Education Provided  Person Taught: patient    Respiratory  Respiratory WDL  Respiratory WDL: .WDL except, all  Rhythm/Pattern, Respiratory: shortness of breath  Cough Frequency: infrequent  Cough Type: congested  Breath Sounds  All Lung Fields Breath Sounds: All Fields  All Lung Fields Breath Sounds: Anterior:, Lateral:, diminished, coarse, rhonchi  Breath Sounds Post-Respiratory Treatment  Breath Sounds Posttreatment All Fields: All Fields  Breath Sounds Posttreatment All Fields: no change    Abdominal Pain       Pain Assessments  Pain (Adult)  (0-10) Pain Rating: Rest: 7  (0-10) Pain Rating:  "Activity: 7  Pain Side/Orientation: generalized  Pain Description: constant (\"hurting all over\")  Response to Pain Interventions: nonverbal indicators absent/decreased    NIH Stroke Scale       Tika Grant RN  02/27/25 17:27 EST    "

## 2025-02-28 ENCOUNTER — APPOINTMENT (OUTPATIENT)
Dept: CT IMAGING | Facility: HOSPITAL | Age: 85
End: 2025-02-28
Payer: MEDICARE

## 2025-02-28 LAB
ANION GAP SERPL CALCULATED.3IONS-SCNC: 10.5 MMOL/L (ref 5–15)
BUN SERPL-MCNC: 18 MG/DL (ref 8–23)
BUN/CREAT SERPL: 16.7 (ref 7–25)
CALCIUM SPEC-SCNC: 8.2 MG/DL (ref 8.6–10.5)
CHLORIDE SERPL-SCNC: 101 MMOL/L (ref 98–107)
CO2 SERPL-SCNC: 23.5 MMOL/L (ref 22–29)
CREAT SERPL-MCNC: 1.08 MG/DL (ref 0.76–1.27)
DEPRECATED RDW RBC AUTO: 51.7 FL (ref 37–54)
EGFRCR SERPLBLD CKD-EPI 2021: 66.8 ML/MIN/1.73
ERYTHROCYTE [DISTWIDTH] IN BLOOD BY AUTOMATED COUNT: 15.7 % (ref 12.3–15.4)
GLUCOSE SERPL-MCNC: 195 MG/DL (ref 65–99)
HCT VFR BLD AUTO: 30.7 % (ref 37.5–51)
HGB BLD-MCNC: 10.5 G/DL (ref 13–17.7)
MCH RBC QN AUTO: 31 PG (ref 26.6–33)
MCHC RBC AUTO-ENTMCNC: 34.2 G/DL (ref 31.5–35.7)
MCV RBC AUTO: 90.6 FL (ref 79–97)
PLATELET # BLD AUTO: 303 10*3/MM3 (ref 140–450)
PMV BLD AUTO: 9.5 FL (ref 6–12)
POTASSIUM SERPL-SCNC: 3.8 MMOL/L (ref 3.5–5.2)
QT INTERVAL: 350 MS
QTC INTERVAL: 439 MS
RBC # BLD AUTO: 3.39 10*6/MM3 (ref 4.14–5.8)
SODIUM SERPL-SCNC: 135 MMOL/L (ref 136–145)
WBC NRBC COR # BLD AUTO: 10.95 10*3/MM3 (ref 3.4–10.8)

## 2025-02-28 PROCEDURE — 25010000002 METHYLPREDNISOLONE PER 40 MG: Performed by: INTERNAL MEDICINE

## 2025-02-28 PROCEDURE — 25010000002 PIPERACILLIN SOD-TAZOBACTAM PER 1 G

## 2025-02-28 PROCEDURE — 80048 BASIC METABOLIC PNL TOTAL CA: CPT | Performed by: STUDENT IN AN ORGANIZED HEALTH CARE EDUCATION/TRAINING PROGRAM

## 2025-02-28 PROCEDURE — 94664 DEMO&/EVAL PT USE INHALER: CPT

## 2025-02-28 PROCEDURE — 93005 ELECTROCARDIOGRAM TRACING: CPT | Performed by: STUDENT IN AN ORGANIZED HEALTH CARE EDUCATION/TRAINING PROGRAM

## 2025-02-28 PROCEDURE — 71275 CT ANGIOGRAPHY CHEST: CPT

## 2025-02-28 PROCEDURE — 94799 UNLISTED PULMONARY SVC/PX: CPT

## 2025-02-28 PROCEDURE — 25510000001 IOPAMIDOL PER 1 ML: Performed by: STUDENT IN AN ORGANIZED HEALTH CARE EDUCATION/TRAINING PROGRAM

## 2025-02-28 PROCEDURE — 94761 N-INVAS EAR/PLS OXIMETRY MLT: CPT

## 2025-02-28 PROCEDURE — 94667 MNPJ CHEST WALL 1ST: CPT

## 2025-02-28 PROCEDURE — 97162 PT EVAL MOD COMPLEX 30 MIN: CPT

## 2025-02-28 PROCEDURE — 97530 THERAPEUTIC ACTIVITIES: CPT

## 2025-02-28 PROCEDURE — 85027 COMPLETE CBC AUTOMATED: CPT | Performed by: STUDENT IN AN ORGANIZED HEALTH CARE EDUCATION/TRAINING PROGRAM

## 2025-02-28 PROCEDURE — 93010 ELECTROCARDIOGRAM REPORT: CPT | Performed by: INTERNAL MEDICINE

## 2025-02-28 RX ORDER — SODIUM CHLORIDE FOR INHALATION 7 %
4 VIAL, NEBULIZER (ML) INHALATION
Status: DISCONTINUED | OUTPATIENT
Start: 2025-03-01 | End: 2025-03-04 | Stop reason: HOSPADM

## 2025-02-28 RX ORDER — SODIUM CHLORIDE FOR INHALATION 7 %
4 VIAL, NEBULIZER (ML) INHALATION
Status: DISCONTINUED | OUTPATIENT
Start: 2025-02-28 | End: 2025-02-28

## 2025-02-28 RX ORDER — ACETYLCYSTEINE 200 MG/ML
2 SOLUTION ORAL; RESPIRATORY (INHALATION)
Status: DISCONTINUED | OUTPATIENT
Start: 2025-02-28 | End: 2025-02-28

## 2025-02-28 RX ORDER — METHYLPREDNISOLONE SODIUM SUCCINATE 40 MG/ML
40 INJECTION, POWDER, LYOPHILIZED, FOR SOLUTION INTRAMUSCULAR; INTRAVENOUS EVERY 12 HOURS
Status: DISCONTINUED | OUTPATIENT
Start: 2025-02-28 | End: 2025-03-01

## 2025-02-28 RX ORDER — ACETYLCYSTEINE 200 MG/ML
2 SOLUTION ORAL; RESPIRATORY (INHALATION)
Status: DISCONTINUED | OUTPATIENT
Start: 2025-03-01 | End: 2025-03-04 | Stop reason: HOSPADM

## 2025-02-28 RX ORDER — IOPAMIDOL 755 MG/ML
100 INJECTION, SOLUTION INTRAVASCULAR
Status: COMPLETED | OUTPATIENT
Start: 2025-02-28 | End: 2025-02-28

## 2025-02-28 RX ORDER — IPRATROPIUM BROMIDE AND ALBUTEROL SULFATE 2.5; .5 MG/3ML; MG/3ML
3 SOLUTION RESPIRATORY (INHALATION)
Status: DISCONTINUED | OUTPATIENT
Start: 2025-02-28 | End: 2025-03-04 | Stop reason: HOSPADM

## 2025-02-28 RX ADMIN — ACETYLCYSTEINE 2 ML: 200 SOLUTION ORAL; RESPIRATORY (INHALATION) at 07:51

## 2025-02-28 RX ADMIN — METHYLPREDNISOLONE SODIUM SUCCINATE 40 MG: 40 INJECTION, POWDER, LYOPHILIZED, FOR SOLUTION INTRAMUSCULAR; INTRAVENOUS at 17:50

## 2025-02-28 RX ADMIN — IOPAMIDOL 95 ML: 755 INJECTION, SOLUTION INTRAVENOUS at 15:01

## 2025-02-28 RX ADMIN — GUAIFENESIN 1200 MG: 600 TABLET, MULTILAYER, EXTENDED RELEASE ORAL at 20:48

## 2025-02-28 RX ADMIN — Medication 4 ML: at 07:52

## 2025-02-28 RX ADMIN — PANTOPRAZOLE SODIUM 40 MG: 40 TABLET, DELAYED RELEASE ORAL at 08:30

## 2025-02-28 RX ADMIN — GUAIFENESIN 1200 MG: 600 TABLET, MULTILAYER, EXTENDED RELEASE ORAL at 08:30

## 2025-02-28 RX ADMIN — GABAPENTIN 300 MG: 300 CAPSULE ORAL at 20:48

## 2025-02-28 RX ADMIN — IPRATROPIUM BROMIDE AND ALBUTEROL SULFATE 3 ML: .5; 3 SOLUTION RESPIRATORY (INHALATION) at 07:48

## 2025-02-28 RX ADMIN — PIPERACILLIN AND TAZOBACTAM 3.38 G: 3; .375 INJECTION, POWDER, FOR SOLUTION INTRAVENOUS at 20:48

## 2025-02-28 RX ADMIN — IPRATROPIUM BROMIDE AND ALBUTEROL SULFATE 3 ML: .5; 3 SOLUTION RESPIRATORY (INHALATION) at 12:05

## 2025-02-28 RX ADMIN — SUCRALFATE 1 G: 1 TABLET ORAL at 17:13

## 2025-02-28 RX ADMIN — SUCRALFATE 1 G: 1 TABLET ORAL at 13:40

## 2025-02-28 RX ADMIN — METHYLPREDNISOLONE SODIUM SUCCINATE 40 MG: 40 INJECTION, POWDER, LYOPHILIZED, FOR SOLUTION INTRAMUSCULAR; INTRAVENOUS at 08:30

## 2025-02-28 RX ADMIN — GABAPENTIN 300 MG: 300 CAPSULE ORAL at 17:13

## 2025-02-28 RX ADMIN — Medication 4 ML: at 20:45

## 2025-02-28 RX ADMIN — GABAPENTIN 300 MG: 300 CAPSULE ORAL at 08:30

## 2025-02-28 RX ADMIN — IPRATROPIUM BROMIDE AND ALBUTEROL SULFATE 3 ML: .5; 3 SOLUTION RESPIRATORY (INHALATION) at 20:30

## 2025-02-28 RX ADMIN — PIPERACILLIN AND TAZOBACTAM 3.38 G: 3; .375 INJECTION, POWDER, FOR SOLUTION INTRAVENOUS at 05:27

## 2025-02-28 RX ADMIN — PIPERACILLIN AND TAZOBACTAM 3.38 G: 3; .375 INJECTION, POWDER, FOR SOLUTION INTRAVENOUS at 13:40

## 2025-02-28 RX ADMIN — ACETYLCYSTEINE 2 ML: 200 SOLUTION ORAL; RESPIRATORY (INHALATION) at 20:30

## 2025-02-28 RX ADMIN — SUCRALFATE 1 G: 1 TABLET ORAL at 20:48

## 2025-02-28 RX ADMIN — IPRATROPIUM BROMIDE AND ALBUTEROL SULFATE 3 ML: .5; 3 SOLUTION RESPIRATORY (INHALATION) at 23:05

## 2025-02-28 RX ADMIN — AZITHROMYCIN 250 MG: 250 TABLET, FILM COATED ORAL at 08:30

## 2025-02-28 RX ADMIN — SUCRALFATE 1 G: 1 TABLET ORAL at 08:30

## 2025-02-28 RX ADMIN — IPRATROPIUM BROMIDE AND ALBUTEROL SULFATE 3 ML: .5; 3 SOLUTION RESPIRATORY (INHALATION) at 15:30

## 2025-02-28 RX ADMIN — NITROGLYCERIN 0.4 MG: 0.4 TABLET SUBLINGUAL at 09:52

## 2025-02-28 RX ADMIN — FLUOXETINE 20 MG: 20 CAPSULE ORAL at 08:30

## 2025-02-28 NOTE — PROGRESS NOTES
Name: Tony Leonard ADMIT: 2025   : 1940  PCP: Alfonso Goldstein MD    MRN: [id number] LOS: 2 days   AGE/SEX: 84 y.o. male  ROOM: [facility]     Subjective     No significant events overnight.  Tolerating antibiotics  Objective   Objective   Vital Signs  Temp:  [97.9 °F (36.6 °C)-98.4 °F (36.9 °C)] 98.4 °F (36.9 °C)  Heart Rate:  [] 97  Resp:  [16-18] 16  BP: (110-129)/(65-82) 129/65  SpO2:  [89 %-100 %] 94 %  on  Flow (L/min) (Oxygen Therapy):  [2-4] 2;   Device (Oxygen Therapy): nasal cannula  Body mass index is 25.67 kg/m².  Physical Exam  Constitutional:       General: He is not in acute distress.     Appearance: He is ill-appearing.   HENT:      Head: Normocephalic and atraumatic.   Cardiovascular:      Rate and Rhythm: Normal rate and regular rhythm.   Pulmonary:      Effort: Pulmonary effort is normal. No respiratory distress.   Abdominal:      General: There is no distension.      Palpations: Abdomen is soft.   Neurological:      General: No focal deficit present.      Mental Status: He is alert and oriented to person, place, and time.         Results Review     I reviewed the patient's new clinical results.  Results from last 7 days   Lab Units 25  0610 25  2202   WBC 10*3/mm3 15.69* 17.83*   HEMOGLOBIN g/dL 12.2* 12.3*   PLATELETS 10*3/mm3 288 301     Results from last 7 days   Lab Units 25  0610 25  2100   SODIUM mmol/L 136 136   POTASSIUM mmol/L 3.9 3.7   CHLORIDE mmol/L 100 101   CO2 mmol/L 24.0 23.7   BUN mg/dL 21 19   CREATININE mg/dL 1.16 1.10   GLUCOSE mg/dL 91 109*   Estimated Creatinine Clearance: 54 mL/min (by C-G formula based on SCr of 1.16 mg/dL).  Results from last 7 days   Lab Units 25  2100   ALBUMIN g/dL 2.3*   BILIRUBIN mg/dL 1.0   ALK PHOS U/L 65   AST (SGOT) U/L 16   ALT (SGPT) U/L 20     Results from last 7 days   Lab Units 25  0610 25  2100   CALCIUM mg/dL 8.3* 8.5*   ALBUMIN g/dL  --  2.3*     Results from last 7  "days   Lab Units 02/26/25 2006   PROCALCITONIN ng/mL 0.47*   LACTATE mmol/L 1.4     COVID19   Date Value Ref Range Status   02/26/2025 Not Detected Not Detected - Ref. Range Final   02/16/2025 Not Detected Not Detected - Ref. Range Final     No results found for: \"HGBA1C\", \"POCGLU\"  Results for orders placed or performed during the hospital encounter of 02/26/25   Blood Culture - Blood, Arm, Left    Specimen: Arm, Left; Blood   Result Value Ref Range    Blood Culture No growth at 24 hours          XR Chest 1 View  Narrative: AP CHEST     HISTORY: Shortness of breath, cough and fever     COMPARISON: 2/16/2025     FINDINGS: There is patchy airspace infiltrate in the left lung base most  consistent with pneumonia. There may be a small left pleural effusion.  Heart size normal. Postsurgical changes of the cervical spine     Impression: Patchy left lung base infiltrate most consistent with pneumonia. There  may be a small left pleural effusion. Follow-up to clearing is  recommended           This report was finalized on 2/26/2025 7:44 PM by Dr. Ricardo Wu M.D on Workstation: [id number]       Scheduled Medications  acetylcysteine, 2 mL, Nebulization, BID - RT  [Held by provider] arformoterol, 15 mcg, Nebulization, BID - RT   And  [Held by provider] budesonide, 0.5 mg, Nebulization, BID - RT  azithromycin, 250 mg, Oral, Q24H  FLUoxetine, 20 mg, Oral, Daily  gabapentin, 300 mg, Oral, TID  guaiFENesin, 1,200 mg, Oral, BID  ipratropium-albuterol, 3 mL, Nebulization, Q4H While Awake - RT  methylPREDNISolone sodium succinate, 40 mg, Intravenous, Q12H  pantoprazole, 40 mg, Oral, Daily  piperacillin-tazobactam, 3.375 g, Intravenous, Q8H  sodium chloride, 4 mL, Nebulization, BID - RT  sucralfate, 1 g, Oral, 4x Daily    Infusions   Diet  Diet: Cardiac; Healthy Heart (2-3 Na+); Fluid Consistency: Thin (IDDSI 0)       Assessment/Plan     Active Hospital Problems    Diagnosis  POA    **Pneumonia [J18.9]  Yes    HTN " (hypertension) [I10]  Yes    Chronic diastolic CHF (congestive heart failure) [I50.32]  Yes    COPD (chronic obstructive pulmonary disease) [J44.9]  Yes    GERD (gastroesophageal reflux disease) [K21.9]  Yes      Resolved Hospital Problems   No resolved problems to display.       84 y.o. male admitted with Pneumonia.    Pneumonia  Lactic acid wnl. Procal 0.47  Follow cultures. RVP negative. Continue zosyn  He follows with Dr. Bob and would like LPC to see in consultation  Obtain CT Angiogram chest - pending   Solumedrol added      COPD   Resume home inhalers     GERD  PPI     Hypertension  Blood pressures acceptable      Chronic diastolic heart failure  EF 56-60%  Hold lasix for now.      SCDs for DVT prophylaxis.  Full code.  Discussed with patient.  Anticipate discharge home later this week.      Alberto Tomlinson MD  [facility]  02/28/25  12:46 EST    Copied text on this note has been reviewed and updated as of 02/28/25

## 2025-02-28 NOTE — CASE MANAGEMENT/SOCIAL WORK
Discharge Planning Assessment  [facility]     Patient Name: Tony Leonard  MRN: [id number]  Today's Date: 2/28/2025    Admit Date: 2/26/2025    Plan: Return to [facility], will need ambulance transport   Discharge Needs Assessment       Row Name 02/28/25 1129       Living Environment    People in Home facility resident    Current Living Arrangements extended care facility  [facility]    Potentially Unsafe Housing Conditions none    In the past 12 months has the electric, gas, oil, or water company threatened to shut off services in your home? No    Provides Primary Care For no one, unable/limited ability to care for self    Family Caregiver if Needed child(brian), adult    Family Caregiver Names Stef Leonard [phone]    Quality of Family Relationships unable to assess    Able to Return to Prior Arrangements yes       Resource/Environmental Concerns    Resource/Environmental Concerns none    Transportation Concerns none       Transportation Needs    In the past 12 months, has lack of transportation kept you from medical appointments or from getting medications? no    In the past 12 months, has lack of transportation kept you from meetings, work, or from getting things needed for daily living? No       Food Insecurity    Within the past 12 months, you worried that your food would run out before you got the money to buy more. Never true    Within the past 12 months, the food you bought just didn't last and you didn't have money to get more. Never true       Transition Planning    Patient/Family Anticipates Transition to long-term care facility    Patient/Family Anticipated Services at Transition none    Transportation Anticipated family or friend will provide       Discharge Needs Assessment    Concerns to be Addressed discharge planning    Do you want help finding or keeping work or a job? Patient unable to answer    Do you want help with school or training? For example, starting or completing job  training or getting a high school diploma, GED or equivalent Patient unable to answer    Anticipated Changes Related to Illness none                   Discharge Plan       Row Name 02/28/25 1131       Plan    Plan Return to [facility], will need ambulance transport    Plan Comments Met with pt at bedside. Pt oriented to self  only. Contacted Pavith[facility]LLAU[facility]. Pt is on a medicaid bed hold and he may return at any time. Pt will need ambulance transport. VM left for son, Stef Leonard, to contact [facility] for any questions. [facility] to follow and assess for discharge needs.                  Continued Care and Services - Admitted Since 2/26/2025       Destination       Service Provider Request Status Services Address Phone Fax Patient Preferred    [facility] Accepted -- [address] [address] [phone] --                  Selected Continued Care - Prior Encounters Includes continued care and service providers with selected services from prior encounters from 11/28/2024 to 2/28/2025      Discharged on 2/19/2025 Admission date: 2/16/2025 - Discharge disposition: Intermediate Care       Destination       Service Provider Services Address Phone Fax Patient Preferred    [facility] [address] [address] [phone] --                          Expected Discharge Date and Time       Expected Discharge Date Expected Discharge Time    Mar 3, 2025            Demographic Summary    No documentation.                  Functional Status    No documentation.                  Psychosocial    No documentation.                  Abuse/Neglect    No documentation.                  Legal    No documentation.                  Substance Abuse    No documentation.                  Patient Forms    No documentation.                     Sarita Stratton RN

## 2025-02-28 NOTE — THERAPY EVALUATION
Patient Name: Tony Leonard  : 1940    MRN: [id number]                              Today's Date: 2025       Admit Date: 2025    Visit Dx:     ICD-10-CM ICD-9-CM   1. Pneumonia of left lower lobe due to infectious organism  J18.9 486   2. COPD with acute exacerbation  J44.1 491.21   3. Chronic diastolic CHF (congestive heart failure)  I50.32 428.32     428.0     Patient Active Problem List   Diagnosis    Thrush, oral    ADD (attention deficit disorder)    Hemorrhoids    Bronchiectasis with acute lower respiratory infection    Diverticul disease small and large intestine, no perforati or abscess    Benign non-nodular prostatic hyperplasia without lower urinary tract symptoms    Gastroesophageal reflux disease    Malaise and fatigue    GERD (gastroesophageal reflux disease)    Environmental allergies    Hemoptysis    Dyslipidemia    Primary osteoarthritis involving multiple joints    Pneumonia of right lower lobe due to infectious organism    Abnormal EKG    COPD (chronic obstructive pulmonary disease)    Mild malnutrition    Acute on chronic respiratory failure with hypoxia    Fracture, intertrochanteric, right femur, closed, initial encounter    Chronic diastolic CHF (congestive heart failure)    Hematoma of frontal scalp    Postoperative anemia due to acute blood loss    Urinary retention    Hemorrhagic disorder due to circulating anticoagulants    History of fracture of right hip    Closed fracture of right hip with routine healing    Chronic low back pain with sciatica    Change in bowel function    Rectal bleeding    Prostate cancer    On home oxygen therapy    Acute viral bronchitis    Neuropathy    Plantar fasciitis    HTN (hypertension)    Bilateral lower extremity edema    Microscopic hematuria    Acute midline low back pain with right-sided sciatica    Spinal stenosis, lumbar region with neurogenic claudication    Compression deformity of vertebra    Rectal bleed    Esophagitis     Angiectasia    Hiatal hernia    Overweight (BMI 25.0-29.9)    Leukocytosis    Bilateral hip pain    Elevated troponin level not due myocardial infarction    Nocturnal hypoxia    Pneumonia of right upper lobe due to infectious organism    NSTEMI (non-ST elevated myocardial infarction)    Chronic respiratory failure with hypoxia    Paroxysmal atrial fibrillation    Acute exacerbation of chronic obstructive pulmonary disease (COPD)    Anemia    Non-compliant patient    Hypokalemia    Spondylolisthesis of lumbar region    Elevated troponin    Rhabdomyolysis    Multiple contusions    Fall    Dizziness    Contusion of hip    Pulmonary embolism    COPD with acute exacerbation    Influenza A    Altered mental status    Pneumonia     Past Medical History:   Diagnosis Date    ADHD (attention deficit hyperactivity disorder)     Altered mental status, unspecified     Anemia, unspecified     Attention-deficit hyperactivity disorder, unspecified type     Benign prostatic hyperplasia without lower urinary tract symptoms     Body mass index 26.0-26.9, adult     Bronchiectasis     Bronchiectasis, uncomplicated     Chest pain, unspecified     CHF (congestive heart failure)     Chronic diastolic (congestive) heart failure     Chronic respiratory failure with hypoxia     Cognitive communication deficit     Colon polyp     COPD (chronic obstructive pulmonary disease)     Depression, unspecified     Diaphragmatic hernia without obstruction or gangrene     Diverticulosis     Diverticulosis of both small and large intestine without perforation or abscess without bleeding     Dorsalgia, unspecified     Encounter for immunization     Eosinophilic asthma     Essential (primary) hypertension     Gastroesophageal reflux disease without esophagitis     Hard of hearing     Hyperlipidemia, unspecified     Hypertensive heart disease with congestive heart failure     Hypoxemia     Influenza due to other identified influenza virus with other  respiratory manifestations     Insomnia, unspecified     Localized edema     Mild protein-calorie malnutrition     Muscle weakness (generalized)     On home oxygen therapy     2 LITERS    Other specified abnormal findings of blood chemistry     Overweight     Paroxysmal atrial fibrillation     Personal history of malignant neoplasm of prostate     Pneumonia     Polyneuropathy, unspecified     Prostate cancer 04/22/2019    Restless leg syndrome     Sleep related hypoventilation in conditions classified elsewhere     Spinal stenosis, lumbar region with neurogenic claudication 08/02/2022    Spinal stenosis, lumbar region, with neurogenic claudication     Weakness      Past Surgical History:   Procedure Laterality Date    BRONCHOSCOPY N/A 04/30/2016    Procedure: BRONCHOSCOPY with BAL of right lower lobe and left  lower lobe.  ;  Surgeon: Nando Diaz MD;  Location: [facility] ENDOSCOPY;  Service:     BRONCHOSCOPY N/A 04/28/2017    Procedure: BRONCHOSCOPY;  Surgeon: Katharina Salter MD;  Location: [facility] ENDOSCOPY;  Service:     BRONCHOSCOPY Bilateral 06/29/2017    Procedure: BRONCHOSCOPY WITH WASHINGS;  Surgeon: Katharina Salter MD;  Location: [facility] ENDOSCOPY;  Service:     BRONCHOSCOPY N/A 01/22/2018    Procedure: BRONCHOSCOPY AT BEDSIDE with BAL;  Surgeon: Katharina Salter MD;  Location: [facility] ENDOSCOPY;  Service:     BRONCHOSCOPY N/A 01/30/2018    Procedure: BRONCHOSCOPY with BAL and washing;  Surgeon: Shreyas Wild MD;  Location: [facility] ENDOSCOPY;  Service:     BRONCHOSCOPY Bilateral 04/24/2018    Procedure: BRONCHOSCOPY WITH WASHING;  Surgeon: Katharina Salter MD;  Location: [facility] ENDOSCOPY;  Service: Pulmonary    BRONCHOSCOPY N/A 12/28/2019    Procedure: BRONCHOSCOPY with bilateral washing;  Surgeon: Katharina Salter MD;  Location: [facility] ENDOSCOPY;  Service: Pulmonary    BRONCHOSCOPY Bilateral 01/04/2023    Procedure: BRONCHOSCOPY with lavage;  Surgeon: Katharina Salter MD;  Location: [facility] ENDOSCOPY;  Service:  Pulmonary;  Laterality: Bilateral;  mucus plugging    CARDIAC CATHETERIZATION N/A 01/29/2023    Procedure: Left Heart Cath;  Surgeon: Johnnie Ang MD;  Location: [facility] CATH INVASIVE LOCATION;  Service: Cardiology;  Laterality: N/A;    CARDIAC CATHETERIZATION N/A 01/29/2023    Procedure: Coronary angiography;  Surgeon: Johnnie Ang MD;  Location: [facility] CATH INVASIVE LOCATION;  Service: Cardiology;  Laterality: N/A;    COLONOSCOPY  05/17/2013    eh, ih, tort, sig tics    COLONOSCOPY N/A 05/10/2019    Non-thrombosed external hemorrhoids found on perianal exam, Diverticulosis, Tortuous colon, One 5 mm polyp in the mid ascending colon, IH. Path: Tubular adenoma.     COLONOSCOPY N/A 09/24/2022    Procedure: COLONOSCOPY to cecum and TI with argon plasma coagulation.;  Surgeon: Bruce Black MD;  Location: [facility] ENDOSCOPY;  Service: Gastroenterology;  Laterality: N/A;  pre- GI bleeding  post- radiation proctitis, diverticulosis    ENDOSCOPY  03/19/2015    z line irreg, hh    ENDOSCOPY N/A 09/24/2022    Procedure: ESOPHAGOGASTRODUODENOSCOPY;  Surgeon: Bruce Black MD;  Location:  [facility] ENDOSCOPY;  Service: Gastroenterology;  Laterality: N/A;  pre- GI bleeding  post- esophagitis, hiatal hernia    HIP OPEN REDUCTION Right 01/17/2018    Procedure: HIP OPEN REDUCTION INTERNAL FIXATION WITH DYNAMIC HIP SCREW;  Surgeon: Les Black MD;  Location: [facility] MAIN OR;  Service:     SPINE SURGERY      TONSILLECTOMY      TOTAL HIP ARTHROPLASTY REVISION Right 09/23/2019    Procedure: HIP  REVISION RIGHT;  Surgeon: Isauro Warner MD;  Location: [facility] MAIN OR;  Service: Orthopedics      General Information       Row Name 02/28/25 1310          Physical Therapy Time and Intention    Document Type evaluation  -ST     Mode of Treatment physical therapy  -ST       Row Name 02/28/25 1310          General Information    Patient Profile Reviewed yes  -ST     Prior Level of Function --  uncertain PLOF, pt from  LT, reports he gets up with assistance  -     Existing Precautions/Restrictions fall  -ST     Barriers to Rehab medically complex;previous functional deficit  -       Row Name 02/28/25 1310          Living Environment    People in Home facility resident  -       Row Name 02/28/25 1310          Cognition    Orientation Status (Cognition) oriented to;person;place;verbal cues/prompts needed for orientation  -       Row Name 02/28/25 1310          Safety Issues/Impairments Affecting Functional Mobility    Safety Issues Affecting Function (Mobility) awareness of need for assistance;insight into deficits/self-awareness;safety precaution awareness;safety precautions follow-through/compliance  -     Impairments Affecting Function (Mobility) balance;endurance/activity tolerance;postural/trunk control;pain  -ST     Comment, Safety Issues/Impairments (Mobility) gait belt, nonskid socks donned  -ST               User Key  (r) = Recorded By, (t) = Taken By, (c) = Cosigned By      Initials Name Provider Type    Nemo Bynum, PT Physical Therapist                   Mobility       Row Name 02/28/25 1310          Bed Mobility    Bed Mobility supine-sit;sit-supine  -ST     Supine-Sit [place] (Bed Mobility) moderate assist (50% patient effort);verbal cues;nonverbal cues (demo/gesture)  -ST     Sit-Supine [place] (Bed Mobility) maximum assist (25% patient effort);2 person assist  -ST     Assistive Device (Bed Mobility) bed rails;head of bed elevated  -ST     Comment, (Bed Mobility) increased time to complete and cues for sequencing. A of 2 to return to supine d/t SOB and coughing fit  -[facility] Name 02/28/25 1310          Sit-Stand Transfer    Sit-Stand [place] (Transfers) moderate assist (50% patient effort);2 person assist;verbal cues;nonverbal cues (demo/gesture)  -ST     Assistive Device (Sit-Stand Transfers) walker, front-wheeled  -ST     Comment, (Sit-Stand Transfer) cues for hand placement   -ST       Row Name 02/28/25 1310          Gait/Stairs (Locomotion)    [place] Level (Gait) verbal cues;nonverbal cues (demo/gesture);minimum assist (75% patient effort);2 person assist  -ST     Assistive Device (Gait) walker, front-wheeled  -ST     Distance in Feet (Gait) 1  two steps forward/retro  -ST     Deviations/Abnormal Patterns (Gait) gait speed decreased;stride length decreased;norm decreased  -ST     Bilateral Gait Deviations forward flexed posture;heel strike decreased  -ST     Comment, (Gait/Stairs) very small shuffling steps. modified and had pt back up to sit back down d/t having coughing fit  -ST               User Key  (r) = Recorded By, (t) = Taken By, (c) = Cosigned By      Initials Name Provider Type    Nemo Bynum PT Physical Therapist                   Obj/Interventions       Row Name 02/28/25 1312          Range of Motion Comprehensive    Comment, General Range of Motion bilat LE WFL  -ST       Row Name 02/28/25 1312          Strength Comprehensive (MMT)    Comment, General Manual Muscle Testing (MMT) Assessment bilat LE WFL - grossly 3+/5 bilat  -ST       Row Name 02/28/25 1312          Balance    Comment, Balance posterior lean with initial standing needing min A to maintain balance, able to correct with time and cues. min A of 2 for dynamic balance. close SBA/CGA for unsupported sitting once bilat feet on floor  -ST               User Key  (r) = Recorded By, (t) = Taken By, (c) = Cosigned By      Initials Name Provider Type    Nemo Bynum, PT Physical Therapist                   Goals/Plan       Row Name 02/28/25 1314          Bed Mobility Goal 1 (PT)    Activity/Assistive Device (Bed Mobility Goal 1, PT) bed mobility activities, all  -ST     [place] Level/Cues Needed (Bed Mobility Goal 1, PT) contact guard required  -ST     Time Frame (Bed Mobility Goal 1, PT) 1 week  -ST     Progress/Outcomes (Bed Mobility Goal 1, PT) new goal  -ST       Row Name  02/28/25 1314          Transfer Goal 1 (PT)    Activity/Assistive Device (Transfer Goal 1, PT) sit-to-stand/stand-to-sit;bed-to-chair/chair-to-bed  -ST     [place] Level/Cues Needed (Transfer Goal 1, PT) contact guard required  -ST     Time Frame (Transfer Goal 1, PT) 1 week  -ST     Progress/Outcome (Transfer Goal 1, PT) new goal  -ST       Row Name 02/28/25 1314          Gait Training Goal 1 (PT)    Activity/Assistive Device (Gait Training Goal 1, PT) gait (walking locomotion);assistive device use  -ST     [place] Level (Gait Training Goal 1, PT) contact guard required  -ST     Distance (Gait Training Goal 1, PT) 25'  -ST     Time Frame (Gait Training Goal 1, PT) 1 week  -ST     Progress/Outcome (Gait Training Goal 1, PT) new goal  -ST               User Key  (r) = Recorded By, (t) = Taken By, (c) = Cosigned By      Initials Name Provider Type    Nemo Bynum, PT Physical Therapist                   Clinical Impression       Row Name 02/28/25 1313          Pain    Pain Side/Orientation generalized  -ST     Pain Management Interventions exercise or physical activity utilized  -ST     Response to Pain Interventions activity participation with tolerable pain  -ST     Pre/Posttreatment Pain Comment reports he aches all over but does not specify or rate - groans with bed  mobility  -ST       Row Name 02/28/25 1313          Plan of Care Review    Plan of Care Reviewed With patient  -ST     Outcome Evaluation Pt is 83 y/o M admitted to [facility] on 2/26/25 with SOB, cough, fever - found to have PNA. PMH: COPD, Afib, HTN. At baseline pt is from LT at [facility], reports he normally gets up to the bathroom and to a chair. Pt currently demos mod A for bed mobility, mod A of 2 to stand and min A of 2 to take a couple steps. Once standing pt starts having a coughing fit, encouraged pt to sit down and catch his breath. Once seated noted pt's HR 24 with de-sat to 80s - pt responsive throughout, just  coughing, returned pt supine in bed and RN present - adjusted tele leads and replaced finger sensor with improvement to HR in 110s and O2 in 90s - RN updated on session. Pt demos mobility below baseline and therefore would benefit from acute skilled PT to address functional mobility deficits. Anticipate d/c to LTC with therapy services.  -ST       Row Name 02/28/25 1313          Therapy Assessment/Plan (PT)    Rehab Potential (PT) fair  -ST     Criteria for Skilled Interventions Met (PT) yes  -ST     Therapy Frequency (PT) 3 times/wk  -ST     Predicted Duration of Therapy Intervention (PT) 1 week  -ST       Row Name 02/28/25 1313          Positioning and Restraints    Pre-Treatment Position in bed  -ST     Post Treatment Position bed  -ST     In Bed notified nsg;supine;call light within reach;encouraged to call for assist;exit alarm on  -ST               User Key  (r) = Recorded By, (t) = Taken By, (c) = Cosigned By      Initials Name Provider Type    Nemo Bynum PT Physical Therapist                   Outcome Measures       Row Name 02/28/25 1314          How much help from another person do you currently need...    Turning from your back to your side while in flat bed without using bedrails? 2  -ST     Moving from lying on back to sitting on the side of a flat bed without bedrails? 2  -ST     Moving to and from a bed to a chair (including a wheelchair)? 2  -ST     Standing up from a chair using your arms (e.g., wheelchair, bedside chair)? 2  -ST     Climbing 3-5 steps with a railing? 1  -ST     To walk in hospital room? 2  -ST     AM-PAC 6 Clicks Score (PT) 11  -ST     Highest Level of Mobility Goal 4 --> Transfer to chair/commode  -ST       Row Name 02/28/25 1314          Functional Assessment    Outcome Measure Options AM-PAC 6 Clicks Basic Mobility (PT)  -ST               User Key  (r) = Recorded By, (t) = Taken By, (c) = Cosigned By      Initials Name Provider Type    Nemo Bynum PT  Physical Therapist                                 Physical Therapy Education       Title: PT OT SLP Therapies (In Progress)       Topic: Physical Therapy (In Progress)       Point: Mobility training (Done)       Learning Progress Summary            Patient Acceptance, E,TB, VU,NR by  at 2/28/2025 1315                      Point: Home exercise program (Not Started)       Learner Progress:  Not documented in this visit.              Point: Body mechanics (Not Started)       Learner Progress:  Not documented in this visit.              Point: Precautions (Done)       Learning Progress Summary            Patient Acceptance, E,TB, VU,NR by  at 2/28/2025 1315                                      User Key       Initials Effective Dates Name Provider Type Discipline     09/22/22 -  Nemo King, STEFF Physical Therapist PT                  PT Recommendation and Plan     Outcome Evaluation: Pt is 83 y/o M admitted to [facility] on 2/26/25 with SOB, cough, fever - found to have PNA. PMH: COPD, Afib, HTN. At baseline pt is from LTC at [facility], reports he normally gets up to the bathroom and to a chair. Pt currently demos mod A for bed mobility, mod A of 2 to stand and min A of 2 to take a couple steps. Once standing pt starts having a coughing fit, encouraged pt to sit down and catch his breath. Once seated noted pt's HR 24 with de-sat to 80s - pt responsive throughout, just coughing, returned pt supine in bed and RN present - adjusted tele leads and replaced finger sensor with improvement to HR in 110s and O2 in 90s - RN updated on session. Pt demos mobility below baseline and therefore would benefit from acute skilled PT to address functional mobility deficits. Anticipate d/c to LTC with therapy services.     Time Calculation:         PT Charges       Row Name 02/28/25 1318             Time Calculation    Start Time 1108  -ST      Stop Time 1134  -ST      Time Calculation (min) 26 min  -ST      PT Received On  02/28/25  -ST      PT - Next Appointment 03/03/25  -ST      PT Goal Re-Cert Due Date 03/07/25  -ST         Time Calculation- PT    Total Timed Code Minutes- PT 13 minute(s)  -ST         Timed Charges    89265 - PT Therapeutic Activity Minutes 13  -ST         Total Minutes    Timed Charges Total Minutes 13  -ST       Total Minutes 13  -ST                User Key  (r) = Recorded By, (t) = Taken By, (c) = Cosigned By      Initials Name Provider Type    Nemo Bynum, PT Physical Therapist                  Therapy Charges for Today       Code Description Service Date Service Provider Modifiers Qty    44656063692 HC PT THERAPEUTIC ACT EA 15 MIN 2/28/2025 Nemo King, PT  1    52450463290 HC PT EVAL MOD COMPLEXITY 3 2/28/2025 Nemo King, PT GP 1            PT G-Codes  Outcome Measure Options: AM-PAC 6 Clicks Basic Mobility (PT)  AM-PAC 6 Clicks Score (PT): 11  PT Discharge Summary  Anticipated Discharge Disposition (PT): extended care facility    Nemo King, PT  2/28/2025

## 2025-02-28 NOTE — PLAN OF CARE
Problem: Pneumonia  Goal: Effective Oxygenation and Ventilation  Intervention: Promote Airway Secretion Clearance  Recent Flowsheet Documentation  Taken 2/28/2025 1419 by Karina Dunlap, RN  Activity Management: activity encouraged  Taken 2/28/2025 0832 by Karina Dunlap, RN  Activity Management: activity encouraged  Cough And Deep Breathing: done independently per patient   Goal Outcome Evaluation:  Plan of Care Reviewed With: patient        Progress: improving     Pt Aox4, but hard of hearing. Pt presents with croupy cough. Encouraging pt to expectorate secretions but has not been very successful. PT attempted to work with pt this shift but pt became very weak and soa and was returned to bed. Remains on 3L NC. VSS. Safety maintained.

## 2025-02-28 NOTE — CONSULTS
Group: [facility]         CONSULT NOTE    Patient Identification:  Tony Leonard  84 y.o.  male  1940  [id number]            Requesting physician: Dr. Tomlinson    Reason for Consultation: Pneumonia, COPD    CC: Cough, shortness of breath    History of Present Illness:  Tony Leonard is an 84-year-old male medical history including: COPD, eosinophilic asthma, diastolic heart failure, hypertension, hyperlipidemia, and paroxysmal atrial fibrillation.  Patient is on chronic anticoagulation with Eliquis for history of DVT.    He follows with Dr. Salter in the pulmonary office for management of his COPD, eosinophilic asthma, and bronchiectasis.  He is maintained on Anoro, hypertonic saline and DuoNebs twice daily, azithromycin 3 times weekly, and Fasenra.  On nocturnal oxygen at 2 L.    He presented to the emergency department on 2/26 with complaints of cough, shortness of breath.  Symptoms have been ongoing for several weeks.  Cough described as productive with clear sputum.  Denies any history of nausea, vomiting, diarrhea, difficulty swallowing.  Upon arrival to the ED, patient was febrile with a temperature of 101 °F.  ED evaluation included chest x-ray which showed a left lower lobe infiltrate, procalcitonin 0.47, WBC 17.83 with 83.9% neutrophils.  Patient was initiated on ceftriaxone for community-acquired pneumonia.    Review of Systems:  CONSTITUTIONAL:  +fever, chills  EYE:  No new vision changes  EAR:  No change in hearing  CARDIAC:  No chest pain  PULMONARY:  +productive cough, shortness of breath  GI:  No diarrhea, hematemesis or hematochezia  RENAL:  No dysuria or urinary frequency  MUSCULOSKELETAL:  +back discomfort  ENDOCRINE:  No heat or cold intolerance  INTEGUMENTARY: No skin rashes  NEUROLOGICAL:  No dizziness or confusion.  No seizure activity  PSYCHIATRIC:  No new anxiety or depression  12 system review of systems performed and all else negative     Past Medical History:  Past  Medical History:   Diagnosis Date    ADHD (attention deficit hyperactivity disorder)     Altered mental status, unspecified     Anemia, unspecified     Attention-deficit hyperactivity disorder, unspecified type     Benign prostatic hyperplasia without lower urinary tract symptoms     Body mass index 26.0-26.9, adult     Bronchiectasis     Bronchiectasis, uncomplicated     Chest pain, unspecified     CHF (congestive heart failure)     Chronic diastolic (congestive) heart failure     Chronic respiratory failure with hypoxia     Cognitive communication deficit     Colon polyp     COPD (chronic obstructive pulmonary disease)     Depression, unspecified     Diaphragmatic hernia without obstruction or gangrene     Diverticulosis     Diverticulosis of both small and large intestine without perforation or abscess without bleeding     Dorsalgia, unspecified     Encounter for immunization     Eosinophilic asthma     Essential (primary) hypertension     Gastroesophageal reflux disease without esophagitis     Hard of hearing     Hyperlipidemia, unspecified     Hypertensive heart disease with congestive heart failure     Hypoxemia     Influenza due to other identified influenza virus with other respiratory manifestations     Insomnia, unspecified     Localized edema     Mild protein-calorie malnutrition     Muscle weakness (generalized)     On home oxygen therapy     2 LITERS    Other specified abnormal findings of blood chemistry     Overweight     Paroxysmal atrial fibrillation     Personal history of malignant neoplasm of prostate     Pneumonia     Polyneuropathy, unspecified     Prostate cancer 04/22/2019    Restless leg syndrome     Sleep related hypoventilation in conditions classified elsewhere     Spinal stenosis, lumbar region with neurogenic claudication 08/02/2022    Spinal stenosis, lumbar region, with neurogenic claudication     Weakness        Past Surgical History:  Past Surgical History:   Procedure Laterality  Date    BRONCHOSCOPY N/A 04/30/2016    Procedure: BRONCHOSCOPY with BAL of right lower lobe and left  lower lobe.  ;  Surgeon: Nando Diaz MD;  Location: [facility] ENDOSCOPY;  Service:     BRONCHOSCOPY N/A 04/28/2017    Procedure: BRONCHOSCOPY;  Surgeon: Katharina Salter MD;  Location: [facility] ENDOSCOPY;  Service:     BRONCHOSCOPY Bilateral 06/29/2017    Procedure: BRONCHOSCOPY WITH WASHINGS;  Surgeon: Katharina Salter MD;  Location: [facility] ENDOSCOPY;  Service:     BRONCHOSCOPY N/A 01/22/2018    Procedure: BRONCHOSCOPY AT BEDSIDE with BAL;  Surgeon: Katharina Salter MD;  Location: [facility] ENDOSCOPY;  Service:     BRONCHOSCOPY N/A 01/30/2018    Procedure: BRONCHOSCOPY with BAL and washing;  Surgeon: Shreyas Wild MD;  Location: [facility] ENDOSCOPY;  Service:     BRONCHOSCOPY Bilateral 04/24/2018    Procedure: BRONCHOSCOPY WITH WASHING;  Surgeon: Katharina Salter MD;  Location: [facility] ENDOSCOPY;  Service: Pulmonary    BRONCHOSCOPY N/A 12/28/2019    Procedure: BRONCHOSCOPY with bilateral washing;  Surgeon: Katharina Salter MD;  Location: [facility] ENDOSCOPY;  Service: Pulmonary    BRONCHOSCOPY Bilateral 01/04/2023    Procedure: BRONCHOSCOPY with lavage;  Surgeon: Katharina Salter MD;  Location: [facility] ENDOSCOPY;  Service: Pulmonary;  Laterality: Bilateral;  mucus plugging    CARDIAC CATHETERIZATION N/A 01/29/2023    Procedure: Left Heart Cath;  Surgeon: Johnnie Ang MD;  Location: [facility] CATH INVASIVE LOCATION;  Service: Cardiology;  Laterality: N/A;    CARDIAC CATHETERIZATION N/A 01/29/2023    Procedure: Coronary angiography;  Surgeon: Johnnie Ang MD;  Location: [facility] CATH INVASIVE LOCATION;  Service: Cardiology;  Laterality: N/A;    COLONOSCOPY  05/17/2013    eh, ih, tort, sig tics    COLONOSCOPY N/A 05/10/2019    Non-thrombosed external hemorrhoids found on perianal exam, Diverticulosis, Tortuous colon, One 5 mm polyp in the mid ascending colon, IH. Path: Tubular adenoma.     COLONOSCOPY N/A 09/24/2022     Procedure: COLONOSCOPY to cecum and TI with argon plasma coagulation.;  Surgeon: Bruce Black MD;  Location:  [facility];  Service: Gastroenterology;  Laterality: N/A;  pre- GI bleeding  post- radiation proctitis, diverticulosis    ENDOSCOPY  03/19/2015    z line irreg, hh    ENDOSCOPY N/A 09/24/2022    Procedure: ESOPHAGOGASTRODUODENOSCOPY;  Surgeon: Bruce Black MD;  Location:  [facility];  Service: Gastroenterology;  Laterality: N/A;  pre- GI bleeding  post- esophagitis, hiatal hernia    HIP OPEN REDUCTION Right 01/17/2018    Procedure: HIP OPEN REDUCTION INTERNAL FIXATION WITH DYNAMIC HIP SCREW;  Surgeon: Les Black MD;  Location: [facility] MAIN OR;  Service:     SPINE SURGERY      TONSILLECTOMY      TOTAL HIP ARTHROPLASTY REVISION Right 09/23/2019    Procedure: HIP  REVISION RIGHT;  Surgeon: Isauro Warner MD;  Location: [facility] MAIN OR;  Service: Orthopedics        Home Meds:  Medications Prior to Admission   Medication Sig Dispense Refill Last Dose/Taking    albuterol (PROVENTIL) (2.5 MG/3ML) 0.083% nebulizer solution Take 2.5 mg by nebulization Every 6 (Six) Hours As Needed for Wheezing. 360 mL 3 2/26/2025 Evening    dimethicone (AVEENO) 1.3 % lotion lotion Apply 1 Application topically to the appropriate area as directed Every 12 (Twelve) Hours As Needed.   2/26/2025 Noon    ferrous sulfate 325 (65 FE) MG tablet Take 1 tablet by mouth Daily With Breakfast.   2/26/2025 Morning    FLUoxetine (PROzac) 20 MG capsule TAKE 1 CAPSULE DAILY 90 capsule 3 2/26/2025 Morning    Fluticasone-Umeclidin-Vilant (Trelegy Ellipta) 100-62.5-25 MCG/ACT inhaler Inhale 1 puff Daily.   2/26/2025 Morning    furosemide (LASIX) 20 MG tablet Take 1 tablet by mouth 2 (Two) Times a Day.   2/26/2025 Morning    gabapentin (NEURONTIN) 300 MG capsule Take 1 capsule by mouth 3 (Three) Times a Day. 6 capsule 0 2/26/2025 Noon    guaiFENesin (MUCINEX) 600 MG 12 hr tablet Take 2 tablets by mouth 2 (Two) Times a Day.    2/26/2025 Morning    Heat Wraps ([employer]maCare Back Pain Therapy) misc Use 1 Pad Daily.   2/26/2025 Noon    Melatonin 3 MG capsule Take 1 capsule by mouth As Needed.   Past Week Bedtime    menthol-zinc oxide (CALMOSEPTINE) 0.44-20.625 % ointment ointment Apply 1 Application topically to the appropriate area as directed Every 12 (Twelve) Hours.   2/26/2025 Noon    multivitamin with minerals (Multivitamin Adult) tablet tablet Take 1 tablet by mouth Daily. 30 tablet 11 2/26/2025 Morning    pantoprazole (PROTONIX) 40 MG EC tablet Take 1 tablet by mouth Daily. 90 tablet 3 2/26/2025 Morning    potassium chloride (KLOR-CON) 20 MEQ packet Take 20 mEq by mouth Daily.   2/26/2025 Morning    sucralfate (Carafate) 1 g tablet Take 1 tablet by mouth 4 (Four) Times a Day. 120 tablet 0 2/26/2025 Evening    acetaminophen (TYLENOL) 325 MG tablet Take 2 tablets by mouth Every 4 (Four) Hours As Needed for Mild Pain.   Unknown    azithromycin (ZITHROMAX) 250 MG tablet Indications: Worsening of Chronic Obstructive Lung Disease 4 tablet 0 Unknown    calcium carbonate (TUMS) 500 MG chewable tablet Chew 2 tablets Every 4 (Four) Hours As Needed for Indigestion or Heartburn.       dicyclomine (BENTYL) 10 MG capsule Take 1 capsule by mouth 3 (Three) Times a Day As Needed (abdominal bloating). 30 capsule 0 Unknown    ondansetron ODT (ZOFRAN-ODT) 4 MG disintegrating tablet Place 1 tablet on the tongue Every 6 (Six) Hours As Needed for Nausea or Vomiting.   Unknown    sodium chloride 7 % nebulizer solution nebulizer solution Take 4 mL by nebulization Every 4 (Four) Hours As Needed (pt takes as needed at home).   Unknown    tiZANidine (ZANAFLEX) 2 MG tablet Take 1 tablet by mouth Daily As Needed for Muscle Spasms.       tuberculin (Aplisol) 5 UNIT/0.1ML injection Inject 0.1 mL into the appropriate area of the skin as directed by provider 1 (One) Time.   Unknown       Allergies:  Allergies   Allergen Reactions    Daliresp [Roflumilast] Anaphylaxis     "Latex Rash    Sulfa Antibiotics Rash       Social History:   Social History     Socioeconomic History    Marital status: Single   Tobacco Use    Smoking status: Never    Smokeless tobacco: Never   Vaping Use    Vaping status: Never Used   Substance and Sexual Activity    Alcohol use: No     Comment: red wine occassional    Drug use: No    Sexual activity: Defer       Family History:  Family History   Problem Relation Age of Onset    Thyroid disease Mother     No Known Problems Father     Malig Hyperthermia Neg Hx        Physical Exam:  /69 (BP Location: Right arm, Patient Position: Lying)   Pulse 115   Temp 97.9 °F (36.6 °C) (Oral)   Resp 18   Ht 180.3 cm (71\")   Wt 73.5 kg (162 lb)   SpO2 100%   BMI 22.59 kg/m²  Body mass index is 22.59 kg/m². 100% 73.5 kg (162 lb)  Constitutional: Elderly, chronically ill-appearing male pt in bed, No acute respiratory distress, no accessory muscle use  Head: - NCAT  Eyes: No pallor, Anicteric conjunctiva, EOMI.  ENMT:  No oral thrush.  Moist MM.   NECK: Trachea midline, No thyromegaly, no palpable cervical LNpathy, no JVD  Heart: Tachycardic rate, regular rhythm, no murmur. No pedal edema   Lungs: ALEXA +, + scattered rhonchi, no wheezing or rales  Abdomen: Soft. No tenderness, guarding or rigidity. No palpable masses  Extremities: Extremities warm and well perfused. No cyanosis/ clubbing  Neuro: Conscious, answers appropriately, no gross focal neuro deficits  Psych: Mood and affect appropriate    PPE recommended per [facility] infectious disease Isolation protocol for the current clinical scenario(as mentioned below) was followed.      LABS:  COVID19   Date Value Ref Range Status   02/26/2025 Not Detected Not Detected - Ref. Range Final       Lab Results   Component Value Date    CALCIUM 8.3 (L) 02/27/2025    PHOS 2.7 02/18/2025     Results from last 7 days   Lab Units 02/27/25  0610 02/26/25  2202 02/26/25  2100 02/26/25  2100 02/26/25 2006   SODIUM mmol/L 136  " --   --  136  --    POTASSIUM mmol/L 3.9  --   --  3.7  --    CHLORIDE mmol/L 100  --   --  101  --    CO2 mmol/L 24.0  --   --  23.7  --    BUN mg/dL 21  --   --  19  --    CREATININE mg/dL 1.16  --   --  1.10  --    GLUCOSE mg/dL 91  --    < > 109*  --    CALCIUM mg/dL 8.3*  --   --  8.5*  --    WBC 10*3/mm3 15.69* 17.83*  --   --   --    HEMOGLOBIN g/dL 12.2* 12.3*  --   --   --    PLATELETS 10*3/mm3 288 301  --   --   --    ALT (SGPT) U/L  --   --   --  20  --    AST (SGOT) U/L  --   --   --  16  --    PROBNP pg/mL  --   --   --   --  200.0   PROCALCITONIN ng/mL  --   --   --   --  0.47*    < > = values in this interval not displayed.     Lab Results   Component Value Date    CKTOTAL 42 04/19/2024    TROPONINT 32 (H) 02/26/2025     Results from last 7 days   Lab Units 02/26/25 2100 02/26/25 2006   HSTROP T ng/L 32* 34*     Results from last 7 days   Lab Units 02/26/25 2100 02/26/25 2050   BLOODCX  No growth at 24 hours No growth at 24 hours     Results from last 7 days   Lab Units 02/26/25 2006   PROCALCITONIN ng/mL 0.47*   LACTATE mmol/L 1.4         Results from last 7 days   Lab Units 02/26/25  1941   ADENOVIRUS DETECTION BY PCR  Not Detected   CORONAVIRUS 229E  Not Detected   CORONAVIRUS HKU1  Not Detected   CORONAVIRUS NL63  Not Detected   CORONAVIRUS OC43  Not Detected   HUMAN METAPNEUMOVIRUS  Not Detected   HUMAN RHINOVIRUS/ENTEROVIRUS  Not Detected   INFLUENZA B PCR  Not Detected   PARAINFLUENZA 1  Not Detected   PARAINFLUENZA VIRUS 2  Not Detected   PARAINFLUENZA VIRUS 3  Not Detected   PARAINFLUENZA VIRUS 4  Not Detected   BORDETELLA PERTUSSIS PCR  Not Detected   BORDETELLA PARAPERTUSSIS PCR  Not Detected   CHLAMYDOPHILA PNEUMONIAE PCR  Not Detected   MYCOPLAMA PNEUMO PCR  Not Detected   RSV, PCR  Not Detected         Results from last 7 days   Lab Units 02/26/25  2100 02/26/25 2050   BLOODCX  No growth at 24 hours No growth at 24 hours     Lab Results   Component Value Date    TSH 3.310  10/17/2023     Estimated Creatinine Clearance: 47.5 mL/min (by C-G formula based on SCr of 1.16 mg/dL).         Imaging: I personally visualized the images of scans/x-rays performed within last 3 days.      Assessment:  Pneumonia  COPD, no acute exacerbation  GERD  Hypertension  Chronic diastolic heart failure    Recommendations:  Chest x-ray reviewed showing patchy consolidation in the left base blunting of the left costophrenic angle, possible small left pleural effusion.  Leukocytosis and elevated procalcitonin present.  Respiratory viral panel negative.  Legionella and strep pneumo antigens negative.  Patient was recently on IV antibiotics during hospitalization for influenza-continue empiric treatment for pneumonia with Zosyn and azithromycin.    No evidence of wheezing on exam to indicate an acute exacerbation of his COPD-continue formulary for home Trelegy (Brovana, Pulmicort) and scheduled DuoNebs.    Patient was placed in face mask upon entering room and kept mask on throughout our encounter. I wore full protective equipment throughout this patient encounter including a face mask, gown and gloves. Hand hygiene was performed before donning protective equipment and after removal when leaving the room.    Wandy Grimaldo, APRN  2/27/2025  23:27 EST      Much of this encounter note is an electronic transcription/translation of spoken language to printed text using Dragon Software.

## 2025-02-28 NOTE — PLAN OF CARE
Goal Outcome Evaluation:  Plan of Care Reviewed With: patient        Progress: no change  Outcome Evaluation: Pt weaned to 2.5L N/C this am, sats >92%. Pt afebrile, Harsh non productive cough. Safety maintained.

## 2025-02-28 NOTE — PLAN OF CARE
Goal Outcome Evaluation:  Plan of Care Reviewed With: patient      Pt is 85 y/o M admitted to [facility] on 2/26/25 with SOB, cough, fever - found to have PNA. PMH: COPD, Afib, HTN. At baseline pt is from LTC at [facility], reports he normally gets up to the bathroom and to a chair. Pt currently demos mod A for bed mobility, mod A of 2 to stand and min A of 2 to take a couple steps. Once standing pt starts having a coughing fit, encouraged pt to sit down and catch his breath. Once seated noted pt's HR 24 with de-sat to 80s - pt responsive throughout, just coughing, returned pt supine in bed and RN present - adjusted tele leads and replaced finger sensor with improvement to HR in 110s and O2 in 90s - RN updated on session. Pt demos mobility below baseline and therefore would benefit from acute skilled PT to address functional mobility deficits. Anticipate d/c to LTC with therapy services.            Anticipated Discharge Disposition (PT): extended care facility

## 2025-02-28 NOTE — DISCHARGE PLACEMENT REQUEST
"Tony Casey (84 y.o. Male)       Date of Birth   1940    Social Security Number       Address   [address] [place] [address]    Home Phone   [phone]    MRN   [id number]       Mosque   Ep[employer]    Marital Status   Single                            Admission Date   2/26/25    Admission Type   Emergency    Admitting Provider   Alberto Tomlinson MD    Attending Provider   Alberto Tomlinson MD    Department, Room/Bed   [address], [address]       Discharge Date       Discharge Disposition       Discharge Destination                                 Attending Provider: Alberto Tomlinson MD    Allergies: Daliresp [Roflumilast], Latex, Sulfa Antibiotics    Isolation: Droplet   Infection: Influenza (02/16/25)   Code Status: CPR    Ht: 180.3 cm (71\")   Wt: 83.5 kg (184 lb 1.4 oz)    Admission Cmt: None   Principal Problem: Pneumonia [J18.9]                   Active Insurance as of 2/26/2025       Primary Coverage       Payor Plan Insurance Group Employer/Plan Group    MEDICARE MEDICARE A & B        Payor Plan Address Payor Plan Phone Number Payor Plan Fax Number Effective Dates    [address] [phone]  5/1/2003 - None Entered    [facility] [id number]         Subscriber Name Subscriber Birth Date Member ID       TONY CASEY 1940 [id number]               Secondary Coverage       Payor Plan Insurance Group Employer/Plan Group     FOR LIFE  FOR LIFE  SUP         Payor Plan Address Payor Plan Phone Number Payor Plan Fax Number Effective Dates    [address] [phone]  3/10/2016 - None Entered    [facility] [id number]         Subscriber Name Subscriber Birth Date Member ID       TONY CASEY 1940 [id number]                     Emergency Contacts        (Rel.) Home Phone Work Phone Mobile Phone    VICKY CASEY (Son) [phone] -- [phone]    Frederick Casey (Son) -- -- [phone]    Ricardo Casey " (Son) [phone] -- [phone]

## 2025-02-28 NOTE — CASE MANAGEMENT/SOCIAL WORK
Case Management Readmission Assessment Note    Case Management Readmission Assessment (all recorded)       Readmission Interview       Row Name 02/28/25 1122             Readmission Indications    Is the patient and/or family able to complete the readmission assessment questions? No  Chart review      Is this hospitalization related to the prior hospital diagnosis? Yes        Row Name 02/28/25 1122             Recommendation for rehospitalization    Did you speak with your physician prior to coming to the hospital No        Row Name 02/28/25 1122             Follow-up Appointments    Do you have a PCP? Yes      Did you have an appointment with PCP after your hospitalization? No      Did you have an appointment with a Specialist? No      Are you current with the Pulmonary Clinic? No      Are you current with the CHF Clinic? No        Row Name 02/28/25 1122             Medications    Did you have newly prescribed medications at discharge? Yes      Did you understand the reasons for your medications at discharge and how to take them? --  Pt has poor memory, facility resident      Are you taking all of you prescribed medications? Yes      What pharmacy was used to fill prescription(s)? Facility resident      Were medications picked up? Yes        Row Name 02/28/25 1122             Index discharge location/services    Where did you go upon discharge? Other (comment)  LTCF      What services were arranged at discharge? Other (comment)  LTCF        Row Name 02/28/25 1122             Palliative Care/Hospice    Are you current with Palliative Care? No      Are you current with Hospice Care? No        Row Name 02/28/25 1122 02/27/25 1845          Advance Directives (For Healthcare)    Pre-existing AND/MOST/POLST Order No No     Advance Directive Status Patient has advance directive, copy in chart Patient has advance directive, copy in chart     Type of Advance Directive -- Health care directive/Living will     Have you  reviewed your Advance Directive and is it valid for this stay? No No     Literature Provided on Advance Directives -- No       Row Name 02/28/25 1122             Readmission Assessment Final Comments    Final Comments Pt was readmitted from [facility] after recent hospitalization with Influenza A. He was admitted with PNA. CCP will follow for dc readiness.

## 2025-02-28 NOTE — PROGRESS NOTES
"  PROGRESS NOTE  Patient Name: Tony Leonard  Age/Sex: 84 y.o. male  : 1940  MRN: [id number]    Date of Admission: 2025  Date of Encounter Visit: 25   LOS: 2 days   Patient Care Team:  Alfonso Goldstein MD as PCP - General (Family Medicine)  Katharina Salter MD as Consulting Physician (Pulmonary Disease)  Anum Bhatt MD as Consulting Physician (Pain Medicine)    Chief Complaint:Pneumonia    Hospital course: Patient with COPD, bronchiectasis, recurrent pneumonias and recurrent mucous plugging aggressive outpatient pulmonary hygiene measures was multiple previous admission with pneumonia at times requiring bronchoscopy for retrieval of thick secretion and at times managing with aggressive mucolytic regimen without the need for bronchoscopy.  Currently the patient is on bronchodilator, Mucolytic, IV steroids  He is afebrile, his respiratory panel was negative         REVIEW OF SYSTEMS:   CONSTITUTIONAL: no fever or chills  CARDIOVASCULAR: No chest pain, chest pressure or chest discomfort. No palpitations, chronic unchanged edema  RESPIRATORY: Positive cough and breath.   GASTROINTESTINAL: No anorexia, nausea, vomiting or diarrhea. No abdominal pain or blood.   HEMATOLOGIC: No bleeding or bruising.     Ventilator/Non-Invasive Ventilation Settings (From admission, onward)      2 L/min              Vital Signs  Temp:  [97.9 °F (36.6 °C)-98.4 °F (36.9 °C)] 98.4 °F (36.9 °C)  Heart Rate:  [] 104  Resp:  [15-18] 15  BP: (108-129)/(57-82) 108/57  SpO2:  [89 %-100 %] 96 %  on  Flow (L/min) (Oxygen Therapy):  [2-4] 2 Device (Oxygen Therapy): nasal cannula    Intake/Output Summary (Last 24 hours) at 2025 1506  Last data filed at 2025 0721  Gross per 24 hour   Intake 530 ml   Output 200 ml   Net 330 ml     Flowsheet Rows      Flowsheet Row First Filed Value   Admission Height 180.3 cm (71\") Documented at 2025 0056   Admission Weight 73.5 kg (162 lb) Documented at 2025 " "0056          Body mass index is 25.67 kg/m².      02/27/25  0056 02/28/25  0454   Weight: 73.5 kg (162 lb) 83.5 kg (184 lb 1.4 oz)       Physical Exam:  GEN:  No acute distress, alert, cooperative, well developed, chronically ill looking  EYES:   Sclerae clear. No icterus. PERRL. Normal EOM  ENT:   External ears/nose normal, no oral lesions, no thrush, mucous membranes moist  NECK:  Supple, midline trachea, no JVD  LUNGS: Normal chest on inspection, wet sounding cough, with positive rhonchi both inspiratory and expiratory, no wheezes or crackles  CV:  Regular rhythm and rate. Normal S1/S2. No murmurs, gallops, or rubs noted.  ABD:  Soft, nontender and nondistended. Normal bowel sounds. No guarding  EXT:  Moves all extremities well. No cyanosis. No redness.  1+ pitting edema  Skin: Dry, intact, no bleeding    Results Review:    Results From Last 14 Days   Lab Units 02/26/25 2006 02/16/25  1158   LACTATE mmol/L 1.4 1.9     Results from last 7 days   Lab Units 02/27/25 0610 02/26/25  2100   SODIUM mmol/L 136 136   POTASSIUM mmol/L 3.9 3.7   CHLORIDE mmol/L 100 101   CO2 mmol/L 24.0 23.7   BUN mg/dL 21 19   CREATININE mg/dL 1.16 1.10   CALCIUM mg/dL 8.3* 8.5*   AST (SGOT) U/L  --  16   ALT (SGPT) U/L  --  20   ANION GAP mmol/L 12.0 11.3   ALBUMIN g/dL  --  2.3*     Results from last 7 days   Lab Units 02/26/25  2100 02/26/25 2006   HSTROP T ng/L 32* 34*         Results from last 7 days   Lab Units 02/26/25 2006   PROBNP pg/mL 200.0     Results from last 7 days   Lab Units 02/27/25  0610 02/26/25  2202   WBC 10*3/mm3 15.69* 17.83*   HEMOGLOBIN g/dL 12.2* 12.3*   HEMATOCRIT % 36.2* 36.2*   PLATELETS 10*3/mm3 288 301   MCV fL 90.7 90.5   NEUTROPHIL % %  --  83.9*   LYMPHOCYTE % %  --  7.0*   MONOCYTES % %  --  8.2   EOSINOPHIL % %  --  0.0*   BASOPHIL % %  --  0.1   IMM GRAN % %  --  0.8*                   Invalid input(s): \"LDLCALC\"          No results found for: \"POCGLU\"  Results from last 7 days   Lab Units " 02/26/25 2006   PROCALCITONIN ng/mL 0.47*   LACTATE mmol/L 1.4     Results from last 7 days   Lab Units 02/27/25  1148 02/26/25  2100 02/26/25 2050   BLOODCX   --  No growth at 24 hours No growth at 24 hours   STREP PNEUMO AG  Negative  --   --    L. PNEUMOPHILA SEROGP 1 UR AG  Negative  --   --          Results from last 7 days   Lab Units 02/26/25  1941   COVID19  Not Detected   ADENOVIRUS DETECTION BY PCR  Not Detected   CORONAVIRUS 229E  Not Detected   CORONAVIRUS HKU1  Not Detected   CORONAVIRUS NL63  Not Detected   CORONAVIRUS OC43  Not Detected   HUMAN METAPNEUMOVIRUS  Not Detected   HUMAN RHINOVIRUS/ENTEROVIRUS  Not Detected   INFLUENZA B PCR  Not Detected   PARAINFLUENZA 1  Not Detected   PARAINFLUENZA VIRUS 2  Not Detected   PARAINFLUENZA VIRUS 3  Not Detected   PARAINFLUENZA VIRUS 4  Not Detected   BORDETELLA PERTUSSIS PCR  Not Detected   BORDETELLA PARAPERTUSSIS PCR  Not Detected   CHLAMYDOPHILA PNEUMONIAE PCR  Not Detected   MYCOPLAMA PNEUMO PCR  Not Detected   RSV, PCR  Not Detected               Imaging:   Imaging Results (All)       Procedure Component Value Units Date/Time    CT Angiogram Chest [[id number]] Resulted: 02/28/25 1501     Updated: 02/28/25 1504    XR Chest 1 View [[id number]] Collected: 02/26/25 1943     I reviewed the patient's new clinical results.  I personally viewed and interpreted the patient's imaging results:        Medication Review:   acetylcysteine, 2 mL, Nebulization, BID - RT  [Held by provider] arformoterol, 15 mcg, Nebulization, BID - RT   And  [Held by provider] budesonide, 0.5 mg, Nebulization, BID - RT  azithromycin, 250 mg, Oral, Q24H  FLUoxetine, 20 mg, Oral, Daily  gabapentin, 300 mg, Oral, TID  guaiFENesin, 1,200 mg, Oral, BID  ipratropium-albuterol, 3 mL, Nebulization, Q4H While Awake - RT  methylPREDNISolone sodium succinate, 40 mg, Intravenous, Q12H  pantoprazole, 40 mg, Oral, Daily  piperacillin-tazobactam, 3.375 g, Intravenous, Q8H  sodium chloride, 4 mL,  Nebulization, BID - RT  sucralfate, 1 g, Oral, 4x Daily             ASSESSMENT:   Pneumonia  COPD, no acute exacerbation  GERD  Hypertension  Chronic diastolic heart failure    PLAN:  Patient is doing slightly better but needs to continue with aggressive pulmonary hygiene  We will add chest PT,  He had several similar episodes before and we were able to manage without the bronchoscopy but he did require several bronchoscopies as well when failing to respond to medical management.  We will add the chest PT to the already ordered flutter valve/Mucomyst/nebulized bronchodilator  Will follow-up on the clinical response for further recommendations  Discussed with patient in details  Labs/Notes/films were independently reviewed and pertinent results are summarized above  The copied texts in this note were reviewed and they are accurate as of 02/28/25    Disposition: Will continue to follow    Katharina Salter MD  02/28/25  15:06 EST             Dictated utilizing Dragon dictation

## 2025-03-01 LAB
ANION GAP SERPL CALCULATED.3IONS-SCNC: 13.1 MMOL/L (ref 5–15)
BUN SERPL-MCNC: 17 MG/DL (ref 8–23)
BUN/CREAT SERPL: 17.5 (ref 7–25)
CALCIUM SPEC-SCNC: 8.5 MG/DL (ref 8.6–10.5)
CHLORIDE SERPL-SCNC: 104 MMOL/L (ref 98–107)
CO2 SERPL-SCNC: 22.9 MMOL/L (ref 22–29)
CREAT SERPL-MCNC: 0.97 MG/DL (ref 0.76–1.27)
DEPRECATED RDW RBC AUTO: 52.9 FL (ref 37–54)
EGFRCR SERPLBLD CKD-EPI 2021: 76 ML/MIN/1.73
ERYTHROCYTE [DISTWIDTH] IN BLOOD BY AUTOMATED COUNT: 15.8 % (ref 12.3–15.4)
GLUCOSE SERPL-MCNC: 194 MG/DL (ref 65–99)
HCT VFR BLD AUTO: 31.9 % (ref 37.5–51)
HGB BLD-MCNC: 10.8 G/DL (ref 13–17.7)
MCH RBC QN AUTO: 31 PG (ref 26.6–33)
MCHC RBC AUTO-ENTMCNC: 33.9 G/DL (ref 31.5–35.7)
MCV RBC AUTO: 91.7 FL (ref 79–97)
PLATELET # BLD AUTO: 298 10*3/MM3 (ref 140–450)
PMV BLD AUTO: 9.2 FL (ref 6–12)
POTASSIUM SERPL-SCNC: 3.5 MMOL/L (ref 3.5–5.2)
RBC # BLD AUTO: 3.48 10*6/MM3 (ref 4.14–5.8)
SODIUM SERPL-SCNC: 140 MMOL/L (ref 136–145)
WBC NRBC COR # BLD AUTO: 9.7 10*3/MM3 (ref 3.4–10.8)

## 2025-03-01 PROCEDURE — 94760 N-INVAS EAR/PLS OXIMETRY 1: CPT

## 2025-03-01 PROCEDURE — 25010000002 METHYLPREDNISOLONE PER 40 MG: Performed by: INTERNAL MEDICINE

## 2025-03-01 PROCEDURE — 94664 DEMO&/EVAL PT USE INHALER: CPT

## 2025-03-01 PROCEDURE — 63710000001 PREDNISONE PER 1 MG: Performed by: INTERNAL MEDICINE

## 2025-03-01 PROCEDURE — 94669 MECHANICAL CHEST WALL OSCILL: CPT

## 2025-03-01 PROCEDURE — 25010000002 PIPERACILLIN SOD-TAZOBACTAM PER 1 G

## 2025-03-01 PROCEDURE — 94799 UNLISTED PULMONARY SVC/PX: CPT

## 2025-03-01 PROCEDURE — 25010000002 ENOXAPARIN PER 10 MG: Performed by: STUDENT IN AN ORGANIZED HEALTH CARE EDUCATION/TRAINING PROGRAM

## 2025-03-01 PROCEDURE — 85027 COMPLETE CBC AUTOMATED: CPT | Performed by: STUDENT IN AN ORGANIZED HEALTH CARE EDUCATION/TRAINING PROGRAM

## 2025-03-01 PROCEDURE — 94761 N-INVAS EAR/PLS OXIMETRY MLT: CPT

## 2025-03-01 PROCEDURE — 80048 BASIC METABOLIC PNL TOTAL CA: CPT | Performed by: STUDENT IN AN ORGANIZED HEALTH CARE EDUCATION/TRAINING PROGRAM

## 2025-03-01 PROCEDURE — 92610 EVALUATE SWALLOWING FUNCTION: CPT

## 2025-03-01 RX ORDER — PREDNISONE 20 MG/1
20 TABLET ORAL 2 TIMES DAILY WITH MEALS
Status: DISCONTINUED | OUTPATIENT
Start: 2025-03-01 | End: 2025-03-04 | Stop reason: HOSPADM

## 2025-03-01 RX ORDER — ENOXAPARIN SODIUM 100 MG/ML
40 INJECTION SUBCUTANEOUS EVERY 24 HOURS
Status: DISCONTINUED | OUTPATIENT
Start: 2025-03-01 | End: 2025-03-04 | Stop reason: HOSPADM

## 2025-03-01 RX ADMIN — IPRATROPIUM BROMIDE AND ALBUTEROL SULFATE 3 ML: .5; 3 SOLUTION RESPIRATORY (INHALATION) at 06:43

## 2025-03-01 RX ADMIN — SUCRALFATE 1 G: 1 TABLET ORAL at 08:33

## 2025-03-01 RX ADMIN — GABAPENTIN 300 MG: 300 CAPSULE ORAL at 08:33

## 2025-03-01 RX ADMIN — ENOXAPARIN SODIUM 40 MG: 100 INJECTION SUBCUTANEOUS at 12:08

## 2025-03-01 RX ADMIN — IPRATROPIUM BROMIDE AND ALBUTEROL SULFATE 3 ML: .5; 3 SOLUTION RESPIRATORY (INHALATION) at 15:45

## 2025-03-01 RX ADMIN — GUAIFENESIN 1200 MG: 600 TABLET, MULTILAYER, EXTENDED RELEASE ORAL at 20:35

## 2025-03-01 RX ADMIN — SUCRALFATE 1 G: 1 TABLET ORAL at 17:00

## 2025-03-01 RX ADMIN — AZITHROMYCIN 250 MG: 250 TABLET, FILM COATED ORAL at 08:33

## 2025-03-01 RX ADMIN — Medication 4 ML: at 06:45

## 2025-03-01 RX ADMIN — ACETYLCYSTEINE 2 ML: 200 SOLUTION ORAL; RESPIRATORY (INHALATION) at 19:42

## 2025-03-01 RX ADMIN — PIPERACILLIN AND TAZOBACTAM 3.38 G: 3; .375 INJECTION, POWDER, FOR SOLUTION INTRAVENOUS at 05:31

## 2025-03-01 RX ADMIN — PIPERACILLIN AND TAZOBACTAM 3.38 G: 3; .375 INJECTION, POWDER, FOR SOLUTION INTRAVENOUS at 21:40

## 2025-03-01 RX ADMIN — PIPERACILLIN AND TAZOBACTAM 3.38 G: 3; .375 INJECTION, POWDER, FOR SOLUTION INTRAVENOUS at 14:41

## 2025-03-01 RX ADMIN — GABAPENTIN 300 MG: 300 CAPSULE ORAL at 17:00

## 2025-03-01 RX ADMIN — IPRATROPIUM BROMIDE AND ALBUTEROL SULFATE 3 ML: .5; 3 SOLUTION RESPIRATORY (INHALATION) at 11:04

## 2025-03-01 RX ADMIN — METHYLPREDNISOLONE SODIUM SUCCINATE 40 MG: 40 INJECTION, POWDER, LYOPHILIZED, FOR SOLUTION INTRAMUSCULAR; INTRAVENOUS at 05:31

## 2025-03-01 RX ADMIN — ACETYLCYSTEINE 2 ML: 200 SOLUTION ORAL; RESPIRATORY (INHALATION) at 11:02

## 2025-03-01 RX ADMIN — FLUOXETINE 20 MG: 20 CAPSULE ORAL at 08:33

## 2025-03-01 RX ADMIN — IPRATROPIUM BROMIDE AND ALBUTEROL SULFATE 3 ML: .5; 3 SOLUTION RESPIRATORY (INHALATION) at 19:41

## 2025-03-01 RX ADMIN — PREDNISONE 20 MG: 20 TABLET ORAL at 17:00

## 2025-03-01 RX ADMIN — PANTOPRAZOLE SODIUM 40 MG: 40 TABLET, DELAYED RELEASE ORAL at 08:33

## 2025-03-01 RX ADMIN — SUCRALFATE 1 G: 1 TABLET ORAL at 12:08

## 2025-03-01 RX ADMIN — GABAPENTIN 300 MG: 300 CAPSULE ORAL at 20:35

## 2025-03-01 RX ADMIN — GUAIFENESIN 1200 MG: 600 TABLET, MULTILAYER, EXTENDED RELEASE ORAL at 08:33

## 2025-03-01 RX ADMIN — Medication 4 ML: at 15:49

## 2025-03-01 RX ADMIN — SUCRALFATE 1 G: 1 TABLET ORAL at 20:35

## 2025-03-01 NOTE — PROGRESS NOTES
Name: Tony Leonard ADMIT: 2025   : 1940  PCP: Alfonso Goldstein MD    MRN: [id number] LOS: 3 days   AGE/SEX: 84 y.o. male  ROOM: [facility]     Subjective     Appears to be improving. No new complaints   Objective   Objective   Vital Signs  Temp:  [97.3 °F (36.3 °C)-98.4 °F (36.9 °C)] 97.3 °F (36.3 °C)  Heart Rate:  [] 98  Resp:  [15-20] 20  BP: (108-122)/(57-65) 122/65  SpO2:  [88 %-99 %] 92 %  on  Flow (L/min) (Oxygen Therapy):  [2-3] 2;   Device (Oxygen Therapy): nasal cannula  Body mass index is 26.96 kg/m².  Physical Exam  Constitutional:       General: He is not in acute distress.     Appearance: He is ill-appearing.   HENT:      Head: Normocephalic and atraumatic.   Eyes:      Extraocular Movements: Extraocular movements intact.      Pupils: Pupils are equal, round, and reactive to light.   Cardiovascular:      Rate and Rhythm: Normal rate and regular rhythm.   Pulmonary:      Effort: Pulmonary effort is normal. No respiratory distress.   Abdominal:      General: There is no distension.      Palpations: Abdomen is soft.   Neurological:      General: No focal deficit present.      Mental Status: He is alert and oriented to person, place, and time.         Results Review     I reviewed the patient's new clinical results.  Results from last 7 days   Lab Units 25  0356 25  1540 25  0610 25  2202   WBC 10*3/mm3 9.70 10.95* 15.69* 17.83*   HEMOGLOBIN g/dL 10.8* 10.5* 12.2* 12.3*   PLATELETS 10*3/mm3 298 303 288 301     Results from last 7 days   Lab Units 25  0356 25  1540 25  0610 25  2100   SODIUM mmol/L 140 135* 136 136   POTASSIUM mmol/L 3.5 3.8 3.9 3.7   CHLORIDE mmol/L 104 101 100 101   CO2 mmol/L 22.9 23.5 24.0 23.7   BUN mg/dL 17 18 21 19   CREATININE mg/dL 0.97 1.08 1.16 1.10   GLUCOSE mg/dL 194* 195* 91 109*   Estimated Creatinine Clearance: 67.8 mL/min (by C-G formula based on SCr of 0.97 mg/dL).  Results from last 7 days   Lab  "Units 02/26/25  2100   ALBUMIN g/dL 2.3*   BILIRUBIN mg/dL 1.0   ALK PHOS U/L 65   AST (SGOT) U/L 16   ALT (SGPT) U/L 20     Results from last 7 days   Lab Units 03/01/25  0356 02/28/25  1540 02/27/25  0610 02/26/25  2100   CALCIUM mg/dL 8.5* 8.2* 8.3* 8.5*   ALBUMIN g/dL  --   --   --  2.3*     Results from last 7 days   Lab Units 02/26/25 2006   PROCALCITONIN ng/mL 0.47*   LACTATE mmol/L 1.4     COVID19   Date Value Ref Range Status   02/26/2025 Not Detected Not Detected - Ref. Range Final   02/16/2025 Not Detected Not Detected - Ref. Range Final     No results found for: \"HGBA1C\", \"POCGLU\"  Results for orders placed or performed during the hospital encounter of 02/26/25   Blood Culture - Blood, Arm, Left    Specimen: Arm, Left; Blood   Result Value Ref Range    Blood Culture No growth at 2 days          CT Angiogram Chest  CT ANGIOGRAM OF THE CHEST. MULTIPLE CORONAL, SAGITTAL, AND 3-D  RECONSTRUCTIONS.     HISTORY: Acute hypoxic respiratory failure     TECHNIQUE: Radiation dose reduction techniques were utilized, including  automated exposure control and exposure modulation based on body size.   CT angiogram of the chest was performed. Multiple coronal, sagittal, and  3-D reconstruction images were obtained.      COMPARISON: CT chest 2/17/2025     FINDINGS:      Please note this examination is nondiagnostic for evaluation for  pulmonary arterial embolism given contrast timing with contrast most  dense within the left atrium with Hounsfield units of 228 versus  contrast within the left main pulmonary artery of 140. Pulmonary  arterial embolism cannot be excluded and if there is continued clinical  concern for pulmonary arterial embolism, recommend repeat CTA chest to  complete evaluation.     Cervical fusion hardware is only partially seen and cannot be evaluated.  No suspicious lytic or blastic osseous lesions.     Evaluation is suboptimal due to beam hardening and streak artifact from  patient's arms being " along their side. Subcentimeter hepatic lesions are  too small to characterize. There is a small to moderate hiatal hernia.     No hilar or axillary adenopathy by size criteria. Subcarinal adenopathy  measures 1.4 cm (previously 0.9 cm on 2/17/2025. Borderline enlarged  node posterior to the left 1.1 cm short axis mention (previously 0.5 cm.  Trace pericardial effusion or thickening. No pneumothorax. Endobronchial  filling defects are present in the bilateral lung bases. There is new  moderate to extensive pulmonary consolidation and opacification  bilaterally, most notably within the mid to lower lungs, with associated  tree-in-bud nodularity. Findings most suggestive of aspiration pneumonia  and correlation with patient history is recommended to confirm  appropriate etiology. Given the masslike appearance and endobronchial  filling defects, follow-up with chest CT in 8 weeks recommended to  ensure resolution exclude the possibility of malignancy. Above-mentioned  mediastinal adenopathy which is new since two 1725 can be be followed up  at this time as well.     Compression deformity of the T1, T12 and L1 vertebral bodies vertebral  body, as before.     This report was finalized on 2/28/2025 4:10 PM by Dr. Salvador Bates M.D  on Workstation: [id number]       Scheduled Medications  acetylcysteine, 2 mL, Nebulization, BID - RT  [Held by provider] arformoterol, 15 mcg, Nebulization, BID - RT   And  [Held by provider] budesonide, 0.5 mg, Nebulization, BID - RT  azithromycin, 250 mg, Oral, Q24H  FLUoxetine, 20 mg, Oral, Daily  gabapentin, 300 mg, Oral, TID  guaiFENesin, 1,200 mg, Oral, BID  ipratropium-albuterol, 3 mL, Nebulization, Q4H While Awake - RT  methylPREDNISolone sodium succinate, 40 mg, Intravenous, Q12H  pantoprazole, 40 mg, Oral, Daily  piperacillin-tazobactam, 3.375 g, Intravenous, Q8H  sodium chloride, 4 mL, Nebulization, BID - RT  sucralfate, 1 g, Oral, 4x Daily    Infusions   Diet  Diet: Cardiac; Healthy  Heart (2-3 Na+); Fluid Consistency: Thin (IDDSI 0)       Assessment/Plan     Active Hospital Problems    Diagnosis  POA    **Pneumonia [J18.9]  Yes    HTN (hypertension) [I10]  Yes    Chronic diastolic CHF (congestive heart failure) [I50.32]  Yes    COPD (chronic obstructive pulmonary disease) [J44.9]  Yes    GERD (gastroesophageal reflux disease) [K21.9]  Yes      Resolved Hospital Problems   No resolved problems to display.       84 y.o. male admitted with Pneumonia.    Pneumonia  Lactic acid wnl. Procal 0.47  Follow cultures. RVP negative. Continue zosyn  He follows with Dr. Bob and would like LPC to see in consultation  Obtain CT Angiogram chest-nondiagnostic for pulmonary embolism due to contrast timing however it did show moderate extensive pulmonary consolidation and opacification bilaterally concerning for aspiration pneumonia.  Will have swallow evaluation done.  Solumedrol added      COPD   Resume home inhalers     GERD  PPI     Hypertension  Blood pressures acceptable      Chronic diastolic heart failure  EF 56-60%  Hold lasix for now.      Lovenox or DVT prophylaxis.  Full code.  Discussed with patient.  Anticipate discharge home later this week.      Alberto Tomlinson MD  [facility]  03/01/25  11:48 EST

## 2025-03-01 NOTE — THERAPY EVALUATION
Acute Care - Speech Language Pathology   Swallow Initial Evaluation [facility]     Patient Name: Tony Leonard  : 1940  MRN: [id number]  Today's Date: 3/1/2025               Admit Date: 2025    Visit Dx:     ICD-10-CM ICD-9-CM   1. Pneumonia of left lower lobe due to infectious organism  J18.9 486   2. COPD with acute exacerbation  J44.1 491.21   3. Chronic diastolic CHF (congestive heart failure)  I50.32 428.32     428.0     Patient Active Problem List   Diagnosis    Thrush, oral    ADD (attention deficit disorder)    Hemorrhoids    Bronchiectasis with acute lower respiratory infection    Diverticul disease small and large intestine, no perforati or abscess    Benign non-nodular prostatic hyperplasia without lower urinary tract symptoms    Gastroesophageal reflux disease    Malaise and fatigue    GERD (gastroesophageal reflux disease)    Environmental allergies    Hemoptysis    Dyslipidemia    Primary osteoarthritis involving multiple joints    Pneumonia of right lower lobe due to infectious organism    Abnormal EKG    COPD (chronic obstructive pulmonary disease)    Mild malnutrition    Acute on chronic respiratory failure with hypoxia    Fracture, intertrochanteric, right femur, closed, initial encounter    Chronic diastolic CHF (congestive heart failure)    Hematoma of frontal scalp    Postoperative anemia due to acute blood loss    Urinary retention    Hemorrhagic disorder due to circulating anticoagulants    History of fracture of right hip    Closed fracture of right hip with routine healing    Chronic low back pain with sciatica    Change in bowel function    Rectal bleeding    Prostate cancer    On home oxygen therapy    Acute viral bronchitis    Neuropathy    Plantar fasciitis    HTN (hypertension)    Bilateral lower extremity edema    Microscopic hematuria    Acute midline low back pain with right-sided sciatica    Spinal stenosis, lumbar region with neurogenic claudication     Compression deformity of vertebra    Rectal bleed    Esophagitis    Angiectasia    Hiatal hernia    Overweight (BMI 25.0-29.9)    Leukocytosis    Bilateral hip pain    Elevated troponin level not due myocardial infarction    Nocturnal hypoxia    Pneumonia of right upper lobe due to infectious organism    NSTEMI (non-ST elevated myocardial infarction)    Chronic respiratory failure with hypoxia    Paroxysmal atrial fibrillation    Acute exacerbation of chronic obstructive pulmonary disease (COPD)    Anemia    Non-compliant patient    Hypokalemia    Spondylolisthesis of lumbar region    Elevated troponin    Rhabdomyolysis    Multiple contusions    Fall    Dizziness    Contusion of hip    Pulmonary embolism    COPD with acute exacerbation    Influenza A    Altered mental status    Pneumonia     Past Medical History:   Diagnosis Date    ADHD (attention deficit hyperactivity disorder)     Altered mental status, unspecified     Anemia, unspecified     Attention-deficit hyperactivity disorder, unspecified type     Benign prostatic hyperplasia without lower urinary tract symptoms     Body mass index 26.0-26.9, adult     Bronchiectasis     Bronchiectasis, uncomplicated     Chest pain, unspecified     CHF (congestive heart failure)     Chronic diastolic (congestive) heart failure     Chronic respiratory failure with hypoxia     Cognitive communication deficit     Colon polyp     COPD (chronic obstructive pulmonary disease)     Depression, unspecified     Diaphragmatic hernia without obstruction or gangrene     Diverticulosis     Diverticulosis of both small and large intestine without perforation or abscess without bleeding     Dorsalgia, unspecified     Encounter for immunization     Eosinophilic asthma     Essential (primary) hypertension     Gastroesophageal reflux disease without esophagitis     Hard of hearing     Hyperlipidemia, unspecified     Hypertensive heart disease with congestive heart failure     Hypoxemia      Influenza due to other identified influenza virus with other respiratory manifestations     Insomnia, unspecified     Localized edema     Mild protein-calorie malnutrition     Muscle weakness (generalized)     On home oxygen therapy     2 LITERS    Other specified abnormal findings of blood chemistry     Overweight     Paroxysmal atrial fibrillation     Personal history of malignant neoplasm of prostate     Pneumonia     Polyneuropathy, unspecified     Prostate cancer 04/22/2019    Restless leg syndrome     Sleep related hypoventilation in conditions classified elsewhere     Spinal stenosis, lumbar region with neurogenic claudication 08/02/2022    Spinal stenosis, lumbar region, with neurogenic claudication     Weakness      Past Surgical History:   Procedure Laterality Date    BRONCHOSCOPY N/A 04/30/2016    Procedure: BRONCHOSCOPY with BAL of right lower lobe and left  lower lobe.  ;  Surgeon: Nando Diaz MD;  Location: [facility] ENDOSCOPY;  Service:     BRONCHOSCOPY N/A 04/28/2017    Procedure: BRONCHOSCOPY;  Surgeon: Katharina Salter MD;  Location: [facility] ENDOSCOPY;  Service:     BRONCHOSCOPY Bilateral 06/29/2017    Procedure: BRONCHOSCOPY WITH WASHINGS;  Surgeon: Katharina Salter MD;  Location: [facility] ENDOSCOPY;  Service:     BRONCHOSCOPY N/A 01/22/2018    Procedure: BRONCHOSCOPY AT BEDSIDE with BAL;  Surgeon: Katharina Salter MD;  Location: [facility] ENDOSCOPY;  Service:     BRONCHOSCOPY N/A 01/30/2018    Procedure: BRONCHOSCOPY with BAL and washing;  Surgeon: Shreyas Wild MD;  Location: [facility] ENDOSCOPY;  Service:     BRONCHOSCOPY Bilateral 04/24/2018    Procedure: BRONCHOSCOPY WITH WASHING;  Surgeon: Katharina Salter MD;  Location: [facility] ENDOSCOPY;  Service: Pulmonary    BRONCHOSCOPY N/A 12/28/2019    Procedure: BRONCHOSCOPY with bilateral washing;  Surgeon: Katharina Salter MD;  Location: [facility] ENDOSCOPY;  Service: Pulmonary    BRONCHOSCOPY Bilateral 01/04/2023    Procedure: BRONCHOSCOPY with lavage;  Surgeon:  Katharina Salter MD;  Location: [facility] ENDOSCOPY;  Service: Pulmonary;  Laterality: Bilateral;  mucus plugging    CARDIAC CATHETERIZATION N/A 01/29/2023    Procedure: Left Heart Cath;  Surgeon: Johnnie Ang MD;  Location: [facility] CATH INVASIVE LOCATION;  Service: Cardiology;  Laterality: N/A;    CARDIAC CATHETERIZATION N/A 01/29/2023    Procedure: Coronary angiography;  Surgeon: Johnnie Ang MD;  Location: [facility] CATH INVASIVE LOCATION;  Service: Cardiology;  Laterality: N/A;    COLONOSCOPY  05/17/2013    eh, ih, tort, sig tics    COLONOSCOPY N/A 05/10/2019    Non-thrombosed external hemorrhoids found on perianal exam, Diverticulosis, Tortuous colon, One 5 mm polyp in the mid ascending colon, IH. Path: Tubular adenoma.     COLONOSCOPY N/A 09/24/2022    Procedure: COLONOSCOPY to cecum and TI with argon plasma coagulation.;  Surgeon: Bruce Black MD;  Location: [facility] ENDOSCOPY;  Service: Gastroenterology;  Laterality: N/A;  pre- GI bleeding  post- radiation proctitis, diverticulosis    ENDOSCOPY  03/19/2015    z line irreg, hh    ENDOSCOPY N/A 09/24/2022    Procedure: ESOPHAGOGASTRODUODENOSCOPY;  Surgeon: Bruce Black MD;  Location: [facility] ENDOSCOPY;  Service: Gastroenterology;  Laterality: N/A;  pre- GI bleeding  post- esophagitis, hiatal hernia    HIP OPEN REDUCTION Right 01/17/2018    Procedure: HIP OPEN REDUCTION INTERNAL FIXATION WITH DYNAMIC HIP SCREW;  Surgeon: Les Black MD;  Location: [facility] MAIN OR;  Service:     SPINE SURGERY      TONSILLECTOMY      TOTAL HIP ARTHROPLASTY REVISION Right 09/23/2019    Procedure: HIP  REVISION RIGHT;  Surgeon: Isauro Warner MD;  Location: [facility] MAIN OR;  Service: Orthopedics       SLP Recommendation and Plan  SLP Swallowing Diagnosis: R/O pharyngeal dysphagia (03/01/25 1000)  SLP Diet Recommendation: regular textures, thin liquids (03/01/25 1000)  Recommended Precautions and Strategies: upright posture during/after eating, small bites of  food and sips of liquid (slow rate, intermittent supervision) (03/01/25 1000)  SLP Rec. for Method of Medication Administration: meds whole, as tolerated (03/01/25 1000)     Monitor for Signs of Aspiration: yes, notify SLP if any concerns (03/01/25 1000)  Recommended Diagnostics:  (fu for DT x1) (03/01/25 1000)           Therapy Frequency (Swallow): PRN (03/01/25 1000)  Predicted Duration Therapy Intervention (Days): until discharge (03/01/25 1000)  Oral Care Recommendations: Oral Care before breakfast, after meals and PRN (03/01/25 1000)                                               SWALLOW EVALUATION (Last 72 Hours)       SLP Adult Swallow Evaluation       Row Name 03/01/25 1000                   Rehab Evaluation    Document Type evaluation  -TH        Subjective Information no complaints  -TH        Patient Observations agree to therapy;alert  -TH        Patient Effort fair  -TH           General Information    Patient Profile Reviewed yes  -TH        Pertinent History Of Current Problem Tony Leonard is an 84-year-old male medical history including: COPD, eosinophilic asthma, diastolic heart failure, hypertension, hyperlipidemia, and paroxysmal atrial fibrillation.  Patient is on chronic anticoagulation with Eliquis for history of DVT.     He follows with Dr. Salter in the pulmonary office for management of his COPD, eosinophilic asthma, and bronchiectasis.  He is maintained on Anoro, hypertonic saline and DuoNebs twice daily, azithromycin 3 times weekly, and Fasenra.  On nocturnal oxygen at 2 L.     He presented to the emergency department on 2/26 with complaints of cough, shortness of breath.  Symptoms have been ongoing for several weeks.  Cough described as productive with clear sputum.  Denies any history of nausea, vomiting, diarrhea, difficulty swallowing.  Upon arrival to the ED, patient was febrile with a temperature of 101 °F.  ED evaluation included chest x-ray which showed a left lower lobe infiltrate,  procalcitonin 0.47, WBC 17.83 with 83.9% neutrophils.  Patient was initiated on ceftriaxone for community-acquired pneumonia. CXR -FINDINGS: There is patchy airspace infiltrate in the left lung base most  consistent with pneumonia. There may be a small left pleural effusion.  Heart size normal. Postsurgical changes of the cervical spine     IMPRESSION:  Patchy left lung base infiltrate most consistent with pneumonia. There  may be a small left pleural effusion. Follow-up to clearing is  recommended  -TH        Current Method of Nutrition regular textures;thin liquids  -TH        Precautions/Limitations, Vision WFL;for purposes of eval  -TH        Precautions/Limitations, Hearing WFL;for purposes of eval  -TH        Prior Level of Function-Communication WFL  -TH        Prior Level of Function-Swallowing --  mech soft at [facility] and when he was here in Jan 2025  -TH        Plans/Goals Discussed with patient  RN  -TH           Oral Motor Structure and Function    Dentition Assessment natural, present and adequate  some missing teeth  -TH        Secretion Management WNL/WFL  -TH        Mucosal Quality moist, healthy  -TH           Oral Musculature and Cranial Nerve Assessment    Oral Motor General Assessment WFL  -TH           General Eating/Swallowing Observations    Respiratory Support Currently in Use nasal cannula  -TH        O2 Liters 3L  -TH        Eating/Swallowing Skills self-fed  -TH        Positioning During Eating upright in bed  -TH        Utensils Used spoon;cup;straw  -TH        Consistencies Trialed regular textures;soft to chew textures;mixed consistency;pureed;ice chips;thin liquids  -TH           Clinical Swallow Eval    Clinical Swallow Evaluation Summary Patient seen for swallow eval on this date 2/2 for concerns of PNA. Patient was sitting upright in bed eating peanut butter and requesting crackers upon SLP arrival. He reports he has no difficulty w/ swallowing. He is missing some lower teeth and  reports he has partials but they were not with him. It appears he was last seen by [facility] in Feb 2025 and was placed on [facility] soft/thins at that time. Completed trials of ice chips, thins, pureed, soft solids, and solids. Baseline cough noted prior to PO trials. He tolerated all consistencies without overt s/sx of aspiration. Mastication appears WFL and no oral residue observed. Swallow is timely. Clear vocal quality observed post trials. At this time recommend continuation of regular diet and thins w/ upright position for all PO, intermittent supervision 2/2 cognition/impulsivitity, slow rate, small bites/sips. Recommend meds 1 at a time w/ thins. Recommend SLP f/u x1.  -TH           SLP Evaluation Clinical Impression    SLP Swallowing Diagnosis R/O pharyngeal dysphagia  -TH        Functional Impact risk of aspiration/pneumonia  -TH           Recommendations    Therapy Frequency (Swallow) PRN  -TH        Predicted Duration Therapy Intervention (Days) until discharge  -TH        SLP Diet Recommendation regular textures;thin liquids  -TH        Recommended Diagnostics --  fu for DT x1  -TH        Recommended Precautions and Strategies upright posture during/after eating;small bites of food and sips of liquid  slow rate, intermittent supervision  -TH        Oral Care Recommendations Oral Care before breakfast, after meals and PRN  -TH        SLP Rec. for Method of Medication Administration meds whole;as tolerated  -TH        Monitor for Signs of Aspiration yes;notify SLP if any concerns  -TH           Swallow Goals (SLP)    Swallow LTGs Patient will demonstrate functional swallow for  -TH        Swallow STGs diet tolerance goal selection (SLP)  -TH        Diet Tolerance Goal Selection (SLP) Patient will tolerate trials of  -TH           (LTG) Patient will demonstrate functional swallow for    Diet Texture (Demonstrate functional swallow) regular textures  -TH        Liquid viscosity (Demonstrate functional  swallow) thin liquids  -TH        [place] (Demonstrate functional swallow) independently (over 90% accuracy)  -TH        Time Frame (Demonstrate functional swallow) 1 week  -TH           (STG) Patient will tolerate trials of    Consistencies Trialed (Tolerate trials) regular textures;thin liquids  -TH        Desired Outcome (Tolerate trials) without signs/symptoms of aspiration  -TH        [place] (Tolerate trials) independently (over 90% accuracy)  -TH        Time Frame (Tolerate trials) 1 week  -TH                  User Key  (r) = Recorded By, (t) = Taken By, (c) = Cosigned By      Initials Name Effective Dates    TH Tika Castaneda SLP 01/05/24 -                     EDUCATION  The patient has been educated in the following areas:   Swallow precautions .        SLP GOALS       Row Name 03/01/25 1000             (LTG) Patient will demonstrate functional swallow for    Diet Texture (Demonstrate functional swallow) regular textures  -TH      Liquid viscosity (Demonstrate functional swallow) thin liquids  -TH      [place] (Demonstrate functional swallow) independently (over 90% accuracy)  -TH      Time Frame (Demonstrate functional swallow) 1 week  -TH         (STG) Patient will tolerate trials of    Consistencies Trialed (Tolerate trials) regular textures;thin liquids  -TH      Desired Outcome (Tolerate trials) without signs/symptoms of aspiration  -TH      [place] (Tolerate trials) independently (over 90% accuracy)  -TH      Time Frame (Tolerate trials) 1 week  -TH                User Key  (r) = Recorded By, (t) = Taken By, (c) = Cosigned By      Initials Name Provider Type    TH Tika Castaneda SLP Speech and Language Pathologist                         Time Calculation:    Time Calculation- SLP       Row Name 03/01/25 1100             Time Calculation- SLP    SLP Start Time 1100  -TH      SLP Received On 03/01/25  -TH                User Key  (r) = Recorded By, (t) = Taken By, (c) = Cosigned  By      Initials Name Provider Type     Tika Castaneda SLP Speech and Language Pathologist                    Therapy Charges for Today       Code Description Service Date Service Provider Modifiers Qty    [id number] HC ST EVAL ORAL PHARYNG SWALLOW 5 3/1/2025 Tika Castaneda SLP GN 1                 BILL Salomon  3/1/2025

## 2025-03-01 NOTE — PLAN OF CARE
Goal Outcome Evaluation:                              SLP Swallowing Diagnosis: R/O pharyngeal dysphagia (03/01/25 1000)           Swallow eval complete. At this time recommend continuation of regular diet and thins w/ upright position for all PO, intermittent supervision 2/2 cognition/impulsivitity, slow rate, small bites/sips. Recommend meds 1 at a time w/ thins. Recommend SLP f/u x1.

## 2025-03-01 NOTE — PROGRESS NOTES
"      [facility]         Dr Dash Sayied   LOS: 3 days   Patient Care Team:  Alfonso Goldstein MD as PCP - General (Family Medicine)  Katharina Salter MD as Consulting Physician (Pulmonary Disease)  Anum Bhatt MD as Consulting Physician (Pain Medicine)    Chief Complaint: Pneumonia with COPD GERD chronic diastolic heart failure    Interval History: Resting comfortably still feels short of breath with minimal activity.    REVIEW OF SYSTEMS:   CARDIOVASCULAR: No chest pain, chest pressure or chest discomfort. No palpitations or edema.   RESPIRATORY: Short of breath with activity  GASTROINTESTINAL: No anorexia, nausea, vomiting or diarrhea. No abdominal pain or blood.   HEMATOLOGIC: No bleeding or bruising.     Ventilator/Non-Invasive Ventilation Settings (From admission, onward)      None              Vital Signs  Temp:  [97.3 °F (36.3 °C)-98.2 °F (36.8 °C)] 97.3 °F (36.3 °C)  Heart Rate:  [] 95  Resp:  [16-20] 16  BP: (115-124)/(61-66) 124/66    Intake/Output Summary (Last 24 hours) at 3/1/2025 1400  Last data filed at 3/1/2025 0518  Gross per 24 hour   Intake --   Output 370 ml   Net -370 ml     Flowsheet Rows      Flowsheet Row First Filed Value   Admission Height 180.3 cm (71\") Documented at 02/27/2025 0056   Admission Weight 73.5 kg (162 lb) Documented at 02/27/2025 0056          Physical Exam:  Patient is examined using the personal protective equipment as per guidelines from infection control for this particular patient as enacted.  Hand hygiene was performed before and after patient interaction.   General Appearance:    Alert, cooperative, in no acute distress.  Following simple commands  ENT Mallampati between 3 and 4 no nasal congestion  Neck midline trachea, no thyromegaly   Lungs:   Diminished breath sounds bilaterally in the bases    Heart:    Regular rhythm and normal rate, normal S1 and S2, no            murmur, no gallop, no rub, no click   Chest Wall:    No " abnormalities observed   Abdomen:     Normal bowel sounds, no masses, no organomegaly, soft        nontender, nondistended, no guarding, no rebound                tenderness   Extremities:   Moves all extremities well, no edema, no cyanosis, no             redness  CNS no focal neurological deficits normal sensory exam  Skin no rashes no nodules  Musculoskeletal no cyanosis no clubbing normal range of motion     Results Review:        Results from last 7 days   Lab Units 03/01/25  0356 02/28/25  1540 02/27/25  0610   SODIUM mmol/L 140 135* 136   POTASSIUM mmol/L 3.5 3.8 3.9   CHLORIDE mmol/L 104 101 100   CO2 mmol/L 22.9 23.5 24.0   BUN mg/dL 17 18 21   CREATININE mg/dL 0.97 1.08 1.16   GLUCOSE mg/dL 194* 195* 91   CALCIUM mg/dL 8.5* 8.2* 8.3*     Results from last 7 days   Lab Units 02/26/25  2100 02/26/25 2006   HSTROP T ng/L 32* 34*     Results from last 7 days   Lab Units 03/01/25  0356 02/28/25  1540 02/27/25  0610   WBC 10*3/mm3 9.70 10.95* 15.69*   HEMOGLOBIN g/dL 10.8* 10.5* 12.2*   HEMATOCRIT % 31.9* 30.7* 36.2*   PLATELETS 10*3/mm3 298 303 288                           I reviewed the patient's new clinical results.  I personally viewed and interpreted the patient's chest x-ray.        Medication Review:   acetylcysteine, 2 mL, Nebulization, BID - RT  [Held by provider] arformoterol, 15 mcg, Nebulization, BID - RT   And  [Held by provider] budesonide, 0.5 mg, Nebulization, BID - RT  azithromycin, 250 mg, Oral, Q24H  enoxaparin sodium, 40 mg, Subcutaneous, Q24H  FLUoxetine, 20 mg, Oral, Daily  gabapentin, 300 mg, Oral, TID  guaiFENesin, 1,200 mg, Oral, BID  ipratropium-albuterol, 3 mL, Nebulization, Q4H While Awake - RT  methylPREDNISolone sodium succinate, 40 mg, Intravenous, Q12H  pantoprazole, 40 mg, Oral, Daily  piperacillin-tazobactam, 3.375 g, Intravenous, Q8H  sodium chloride, 4 mL, Nebulization, BID - RT  sucralfate, 1 g, Oral, 4x Daily             ASSESSMENT:   Bilateral  pneumonia  COPD  GERD  Hypertension  Chronic diastolic heart failure    PLAN:  Clinically stable improving.  I will start weaning down steroids to oral  Oxygen requirement at baseline on 2 L nasal cannula  Aggressive pulmonary toilet with bronchodilators chest PT flutter valve Mucomyst  Mobilize ambulate  Continue current antibiotics de-escalate based on culture      Ekta Grace MD  03/01/25  14:00 EST

## 2025-03-01 NOTE — PLAN OF CARE
Goal Outcome Evaluation:  Plan of Care Reviewed With: patient        Progress: improving  Outcome Evaluation: VSS; no complaints of pain or discomfort; pt has been coughing all day; pt started oral prednisone; encouraged activity; safety maintained                              97.5 97.5

## 2025-03-01 NOTE — PLAN OF CARE
Problem: Adult Inpatient Plan of Care  Goal: Plan of Care Review  Outcome: Progressing  Flowsheets (Taken 3/1/2025 0440)  Progress: improving  Outcome Evaluation: vss. no c/o pain. iv abx. incontinence care provided prn. resting well throughout shift.  Plan of Care Reviewed With: patient  Goal: Patient-Specific Goal (Individualized)  Outcome: Progressing  Goal: Absence of Hospital-Acquired Illness or Injury  Outcome: Progressing  Intervention: Identify and Manage Fall Risk  Recent Flowsheet Documentation  Taken 3/1/2025 0414 by Marcy Calhoun, RN  Safety Promotion/Fall Prevention: safety round/check completed  Taken 3/1/2025 0221 by Marcy Calhoun, RN  Safety Promotion/Fall Prevention: safety round/check completed  Taken 3/1/2025 0029 by Marcy Calhoun, RN  Safety Promotion/Fall Prevention: safety round/check completed  Taken 2/28/2025 2243 by Marcy Calhoun, RN  Safety Promotion/Fall Prevention: safety round/check completed  Taken 2/28/2025 2000 by Marcy Calhoun RN  Safety Promotion/Fall Prevention: safety round/check completed  Goal: Optimal Comfort and Wellbeing  Outcome: Progressing  Intervention: Provide Person-Centered Care  Recent Flowsheet Documentation  Taken 2/28/2025 2000 by Marcy Calhoun RN  Trust Relationship/Rapport:   care explained   choices provided   emotional support provided   empathic listening provided  Goal: Readiness for Transition of Care  Outcome: Progressing     Problem: Comorbidity Management  Goal: Maintenance of COPD Symptom Control  Outcome: Progressing  Goal: Maintenance of Heart Failure Symptom Control  Outcome: Progressing  Goal: Blood Pressure in Desired Range  Outcome: Progressing     Problem: Fall Injury Risk  Goal: Absence of Fall and Fall-Related Injury  Outcome: Progressing  Intervention: Promote Injury-Free Environment  Recent Flowsheet Documentation  Taken 3/1/2025 0414 by Marcy Calhoun, RN  Safety Promotion/Fall Prevention: safety round/check  completed  Taken 3/1/2025 0221 by Marcy Calhoun RN  Safety Promotion/Fall Prevention: safety round/check completed  Taken 3/1/2025 0029 by Marcy Calhoun RN  Safety Promotion/Fall Prevention: safety round/check completed  Taken 2/28/2025 2243 by Marcy Calhoun RN  Safety Promotion/Fall Prevention: safety round/check completed  Taken 2/28/2025 2000 by Marcy Calhoun RN  Safety Promotion/Fall Prevention: safety round/check completed     Problem: Pneumonia  Goal: Fluid Balance  Outcome: Progressing  Goal: Absence of Infection Signs and Symptoms  Outcome: Progressing  Goal: Effective Oxygenation and Ventilation  Outcome: Progressing  Intervention: Promote Airway Secretion Clearance  Recent Flowsheet Documentation  Taken 3/1/2025 0029 by Marcy Calhoun RN  Cough And Deep Breathing: done independently per patient     Problem: Skin Injury Risk Increased  Goal: Skin Health and Integrity  Outcome: Progressing  Intervention: Optimize Skin Protection  Recent Flowsheet Documentation  Taken 2/28/2025 2000 by Marcy Calhoun RN  Pressure Reduction Techniques: frequent weight shift encouraged  Pressure Reduction Devices: alternating pressure pump (GUILHERME)     Problem: Sepsis/Septic Shock  Goal: Optimal Coping  Outcome: Progressing  Goal: Absence of Bleeding  Outcome: Progressing  Goal: Blood Glucose Level Within Target Range  Outcome: Progressing  Goal: Absence of Infection Signs and Symptoms  Outcome: Progressing  Goal: Optimal Nutrition Delivery  Outcome: Progressing   Goal Outcome Evaluation:  Plan of Care Reviewed With: patient        Progress: improving  Outcome Evaluation: vss. no c/o pain. iv abx. incontinence care provided prn. resting well throughout shift.

## 2025-03-02 PROCEDURE — 25010000002 ENOXAPARIN PER 10 MG: Performed by: STUDENT IN AN ORGANIZED HEALTH CARE EDUCATION/TRAINING PROGRAM

## 2025-03-02 PROCEDURE — 94799 UNLISTED PULMONARY SVC/PX: CPT

## 2025-03-02 PROCEDURE — 94664 DEMO&/EVAL PT USE INHALER: CPT

## 2025-03-02 PROCEDURE — 94760 N-INVAS EAR/PLS OXIMETRY 1: CPT

## 2025-03-02 PROCEDURE — 63710000001 PREDNISONE PER 1 MG: Performed by: INTERNAL MEDICINE

## 2025-03-02 PROCEDURE — 25010000002 PIPERACILLIN SOD-TAZOBACTAM PER 1 G

## 2025-03-02 PROCEDURE — 94669 MECHANICAL CHEST WALL OSCILL: CPT

## 2025-03-02 PROCEDURE — 94761 N-INVAS EAR/PLS OXIMETRY MLT: CPT

## 2025-03-02 RX ORDER — AMOXICILLIN AND CLAVULANATE POTASSIUM 500; 125 MG/1; MG/1
1 TABLET, FILM COATED ORAL 3 TIMES DAILY
Status: DISCONTINUED | OUTPATIENT
Start: 2025-03-02 | End: 2025-03-04 | Stop reason: HOSPADM

## 2025-03-02 RX ORDER — FUROSEMIDE 20 MG/1
20 TABLET ORAL 2 TIMES DAILY
Status: DISCONTINUED | OUTPATIENT
Start: 2025-03-02 | End: 2025-03-04 | Stop reason: HOSPADM

## 2025-03-02 RX ADMIN — GABAPENTIN 300 MG: 300 CAPSULE ORAL at 08:54

## 2025-03-02 RX ADMIN — SUCRALFATE 1 G: 1 TABLET ORAL at 17:25

## 2025-03-02 RX ADMIN — GUAIFENESIN 1200 MG: 600 TABLET, MULTILAYER, EXTENDED RELEASE ORAL at 21:21

## 2025-03-02 RX ADMIN — ACETYLCYSTEINE 2 ML: 200 SOLUTION ORAL; RESPIRATORY (INHALATION) at 11:25

## 2025-03-02 RX ADMIN — AMOXICILLIN AND CLAVULANATE POTASSIUM 500 MG: 500; 125 TABLET, FILM COATED ORAL at 21:20

## 2025-03-02 RX ADMIN — AZITHROMYCIN 250 MG: 250 TABLET, FILM COATED ORAL at 08:54

## 2025-03-02 RX ADMIN — GABAPENTIN 300 MG: 300 CAPSULE ORAL at 21:20

## 2025-03-02 RX ADMIN — FUROSEMIDE 20 MG: 20 TABLET ORAL at 14:01

## 2025-03-02 RX ADMIN — PIPERACILLIN AND TAZOBACTAM 3.38 G: 3; .375 INJECTION, POWDER, FOR SOLUTION INTRAVENOUS at 05:16

## 2025-03-02 RX ADMIN — IPRATROPIUM BROMIDE AND ALBUTEROL SULFATE 3 ML: .5; 3 SOLUTION RESPIRATORY (INHALATION) at 07:50

## 2025-03-02 RX ADMIN — ENOXAPARIN SODIUM 40 MG: 100 INJECTION SUBCUTANEOUS at 12:09

## 2025-03-02 RX ADMIN — ACETYLCYSTEINE 2 ML: 200 SOLUTION ORAL; RESPIRATORY (INHALATION) at 23:58

## 2025-03-02 RX ADMIN — SUCRALFATE 1 G: 1 TABLET ORAL at 21:20

## 2025-03-02 RX ADMIN — IPRATROPIUM BROMIDE AND ALBUTEROL SULFATE 3 ML: .5; 3 SOLUTION RESPIRATORY (INHALATION) at 11:24

## 2025-03-02 RX ADMIN — Medication 4 ML: at 07:51

## 2025-03-02 RX ADMIN — SUCRALFATE 1 G: 1 TABLET ORAL at 08:54

## 2025-03-02 RX ADMIN — FLUOXETINE 20 MG: 20 CAPSULE ORAL at 08:54

## 2025-03-02 RX ADMIN — FUROSEMIDE 20 MG: 20 TABLET ORAL at 21:23

## 2025-03-02 RX ADMIN — GUAIFENESIN 1200 MG: 600 TABLET, MULTILAYER, EXTENDED RELEASE ORAL at 08:54

## 2025-03-02 RX ADMIN — AMOXICILLIN AND CLAVULANATE POTASSIUM 500 MG: 500; 125 TABLET, FILM COATED ORAL at 17:25

## 2025-03-02 RX ADMIN — IPRATROPIUM BROMIDE AND ALBUTEROL SULFATE 3 ML: .5; 3 SOLUTION RESPIRATORY (INHALATION) at 19:03

## 2025-03-02 RX ADMIN — IPRATROPIUM BROMIDE AND ALBUTEROL SULFATE 3 ML: .5; 3 SOLUTION RESPIRATORY (INHALATION) at 16:17

## 2025-03-02 RX ADMIN — GABAPENTIN 300 MG: 300 CAPSULE ORAL at 17:25

## 2025-03-02 RX ADMIN — IPRATROPIUM BROMIDE AND ALBUTEROL SULFATE 3 ML: .5; 3 SOLUTION RESPIRATORY (INHALATION) at 23:58

## 2025-03-02 RX ADMIN — PREDNISONE 20 MG: 20 TABLET ORAL at 08:54

## 2025-03-02 RX ADMIN — PREDNISONE 20 MG: 20 TABLET ORAL at 17:25

## 2025-03-02 RX ADMIN — SUCRALFATE 1 G: 1 TABLET ORAL at 12:09

## 2025-03-02 RX ADMIN — PANTOPRAZOLE SODIUM 40 MG: 40 TABLET, DELAYED RELEASE ORAL at 08:54

## 2025-03-02 RX ADMIN — Medication 4 ML: at 16:18

## 2025-03-02 NOTE — PROGRESS NOTES
Name: Tony Leonard ADMIT: 2025   : 1940  PCP: Alfonso Goldstein MD    MRN: [id number] LOS: 4 days   AGE/SEX: 84 y.o. male  ROOM: [facility]     Subjective     Coughing quite a bit today    Objective   Objective   Vital Signs  Temp:  [97.3 °F (36.3 °C)-98.1 °F (36.7 °C)] 97.3 °F (36.3 °C)  Heart Rate:  [] 89  Resp:  [16-18] 18  BP: (122-147)/(62-79) 147/79  SpO2:  [90 %-96 %] 90 %  on  Flow (L/min) (Oxygen Therapy):  [2] 2;   Device (Oxygen Therapy): nasal cannula  Body mass index is 26.47 kg/m².  Physical Exam  Constitutional:       General: He is not in acute distress.     Appearance: He is ill-appearing.   HENT:      Head: Normocephalic and atraumatic.   Eyes:      Extraocular Movements: Extraocular movements intact.      Pupils: Pupils are equal, round, and reactive to light.   Cardiovascular:      Rate and Rhythm: Normal rate and regular rhythm.   Pulmonary:      Effort: Pulmonary effort is normal. No respiratory distress.   Abdominal:      General: There is no distension.      Palpations: Abdomen is soft.   Skin:     General: Skin is warm and dry.   Neurological:      General: No focal deficit present.      Mental Status: He is alert and oriented to person, place, and time.         Results Review     I reviewed the patient's new clinical results.  Results from last 7 days   Lab Units 25  0356 25  1540 25  0610 25  2202   WBC 10*3/mm3 9.70 10.95* 15.69* 17.83*   HEMOGLOBIN g/dL 10.8* 10.5* 12.2* 12.3*   PLATELETS 10*3/mm3 298 303 288 301     Results from last 7 days   Lab Units 25  0356 25  1540 25  0610 25  2100   SODIUM mmol/L 140 135* 136 136   POTASSIUM mmol/L 3.5 3.8 3.9 3.7   CHLORIDE mmol/L 104 101 100 101   CO2 mmol/L 22.9 23.5 24.0 23.7   BUN mg/dL 17 18 21 19   CREATININE mg/dL 0.97 1.08 1.16 1.10   GLUCOSE mg/dL 194* 195* 91 109*   Estimated Creatinine Clearance: 66.6 mL/min (by C-G formula based on SCr of 0.97  "mg/dL).  Results from last 7 days   Lab Units 02/26/25  2100   ALBUMIN g/dL 2.3*   BILIRUBIN mg/dL 1.0   ALK PHOS U/L 65   AST (SGOT) U/L 16   ALT (SGPT) U/L 20     Results from last 7 days   Lab Units 03/01/25  0356 02/28/25  1540 02/27/25  0610 02/26/25  2100   CALCIUM mg/dL 8.5* 8.2* 8.3* 8.5*   ALBUMIN g/dL  --   --   --  2.3*     Results from last 7 days   Lab Units 02/26/25 2006   PROCALCITONIN ng/mL 0.47*   LACTATE mmol/L 1.4     COVID19   Date Value Ref Range Status   02/26/2025 Not Detected Not Detected - Ref. Range Final   02/16/2025 Not Detected Not Detected - Ref. Range Final     No results found for: \"HGBA1C\", \"POCGLU\"  Results for orders placed or performed during the hospital encounter of 02/26/25   Blood Culture - Blood, Arm, Left    Specimen: Arm, Left; Blood   Result Value Ref Range    Blood Culture No growth at 3 days          CT Angiogram Chest  CT ANGIOGRAM OF THE CHEST. MULTIPLE CORONAL, SAGITTAL, AND 3-D  RECONSTRUCTIONS.     HISTORY: Acute hypoxic respiratory failure     TECHNIQUE: Radiation dose reduction techniques were utilized, including  automated exposure control and exposure modulation based on body size.   CT angiogram of the chest was performed. Multiple coronal, sagittal, and  3-D reconstruction images were obtained.      COMPARISON: CT chest 2/17/2025     FINDINGS:      Please note this examination is nondiagnostic for evaluation for  pulmonary arterial embolism given contrast timing with contrast most  dense within the left atrium with Hounsfield units of 228 versus  contrast within the left main pulmonary artery of 140. Pulmonary  arterial embolism cannot be excluded and if there is continued clinical  concern for pulmonary arterial embolism, recommend repeat CTA chest to  complete evaluation.     Cervical fusion hardware is only partially seen and cannot be evaluated.  No suspicious lytic or blastic osseous lesions.     Evaluation is suboptimal due to beam hardening and streak " artifact from  patient's arms being along their side. Subcentimeter hepatic lesions are  too small to characterize. There is a small to moderate hiatal hernia.     No hilar or axillary adenopathy by size criteria. Subcarinal adenopathy  measures 1.4 cm (previously 0.9 cm on 2/17/2025. Borderline enlarged  node posterior to the left 1.1 cm short axis mention (previously 0.5 cm.  Trace pericardial effusion or thickening. No pneumothorax. Endobronchial  filling defects are present in the bilateral lung bases. There is new  moderate to extensive pulmonary consolidation and opacification  bilaterally, most notably within the mid to lower lungs, with associated  tree-in-bud nodularity. Findings most suggestive of aspiration pneumonia  and correlation with patient history is recommended to confirm  appropriate etiology. Given the masslike appearance and endobronchial  filling defects, follow-up with chest CT in 8 weeks recommended to  ensure resolution exclude the possibility of malignancy. Above-mentioned  mediastinal adenopathy which is new since two 1725 can be be followed up  at this time as well.     Compression deformity of the T1, T12 and L1 vertebral bodies vertebral  body, as before.     This report was finalized on 2/28/2025 4:10 PM by Dr. Salvador Bates M.D  on Workstation: [id number]       Scheduled Medications  acetylcysteine, 2 mL, Nebulization, BID - RT  amoxicillin-clavulanate, 1 tablet, Oral, TID  [Held by provider] arformoterol, 15 mcg, Nebulization, BID - RT   And  [Held by provider] budesonide, 0.5 mg, Nebulization, BID - RT  azithromycin, 250 mg, Oral, Q24H  enoxaparin sodium, 40 mg, Subcutaneous, Q24H  FLUoxetine, 20 mg, Oral, Daily  gabapentin, 300 mg, Oral, TID  guaiFENesin, 1,200 mg, Oral, BID  ipratropium-albuterol, 3 mL, Nebulization, Q4H While Awake - RT  pantoprazole, 40 mg, Oral, Daily  predniSONE, 20 mg, Oral, BID With Meals  sodium chloride, 4 mL, Nebulization, BID - RT  sucralfate, 1 g,  Oral, 4x Daily    Infusions   Diet  Diet: Cardiac; Healthy Heart (2-3 Na+); Fluid Consistency: Thin (IDDSI 0)       Assessment/Plan     Active Hospital Problems    Diagnosis  POA    **Pneumonia [J18.9]  Yes    HTN (hypertension) [I10]  Yes    Chronic diastolic CHF (congestive heart failure) [I50.32]  Yes    COPD (chronic obstructive pulmonary disease) [J44.9]  Yes    GERD (gastroesophageal reflux disease) [K21.9]  Yes      Resolved Hospital Problems   No resolved problems to display.       84 y.o. male admitted with Pneumonia.    Pneumonia  Lactic acid wnl. Procal 0.47  Follow cultures. RVP negative. Transitioned to PO augmentin  He follows with Dr. Bob and would like LPC to see in consultation  Obtain CT Angiogram chest-nondiagnostic for pulmonary embolism due to contrast timing however it did show moderate extensive pulmonary consolidation and opacification bilaterally concerning for aspiration pneumonia.  Will have swallow evaluation done.  Solumedrol added      COPD   Resume home inhalers     GERD  PPI     Hypertension  Blood pressures acceptable      Chronic diastolic heart failure  EF 56-60%  Resume lasix    PT/OT  May need SNF      Lovenox or DVT prophylaxis.  Full code.  Discussed with patient.  Anticipate discharge to be determined       Alberto Tomlinson MD  [facility]  03/02/25  12:37 EST

## 2025-03-02 NOTE — PROGRESS NOTES
"      [facility]         Dr Dash Sayied   LOS: 4 days   Patient Care Team:  Alfonso Goldstein MD as PCP - General (Family Medicine)  Katharina Salter MD as Consulting Physician (Pulmonary Disease)  Anum Bhatt MD as Consulting Physician (Pain Medicine)    Chief Complaint: Pneumonia with COPD GERD chronic diastolic heart failure    Interval History: Resting well at this time.  Reports shortness of breath with minimal activity    REVIEW OF SYSTEMS:   CARDIOVASCULAR: No chest pain, chest pressure or chest discomfort. No palpitations or edema.   RESPIRATORY: Short of breath with activity  GASTROINTESTINAL: No anorexia, nausea, vomiting or diarrhea. No abdominal pain or blood.   HEMATOLOGIC: No bleeding or bruising.     Ventilator/Non-Invasive Ventilation Settings (From admission, onward)      None              Vital Signs  Temp:  [97.3 °F (36.3 °C)-98.1 °F (36.7 °C)] 97.3 °F (36.3 °C)  Heart Rate:  [] 89  Resp:  [16-18] 18  BP: (122-147)/(62-79) 147/79    Intake/Output Summary (Last 24 hours) at 3/2/2025 1216  Last data filed at 3/2/2025 0930  Gross per 24 hour   Intake 200 ml   Output 300 ml   Net -100 ml     Flowsheet Rows      Flowsheet Row First Filed Value   Admission Height 180.3 cm (71\") Documented at 02/27/2025 0056   Admission Weight 73.5 kg (162 lb) Documented at 02/27/2025 0056          Physical Exam:  Patient is examined using the personal protective equipment as per guidelines from infection control for this particular patient as enacted.  Hand hygiene was performed before and after patient interaction.   General Appearance:    Alert, cooperative, in no acute distress.  Following simple commands  ENT Mallampati between 3 and 4 no nasal congestion  Neck midline trachea, no thyromegaly   Lungs:   Diminished breath sounds bilaterally in the bases    Heart:    Regular rhythm and normal rate, normal S1 and S2, no            murmur, no gallop, no rub, no click   Chest Wall:    No " abnormalities observed   Abdomen:     Normal bowel sounds, no masses, no organomegaly, soft        nontender, nondistended, no guarding, no rebound                tenderness   Extremities:   Moves all extremities well, no edema, no cyanosis, no             redness  CNS no focal neurological deficits normal sensory exam  Skin no rashes no nodules  Musculoskeletal no cyanosis no clubbing normal range of motion     Results Review:        Results from last 7 days   Lab Units 03/01/25  0356 02/28/25  1540 02/27/25  0610   SODIUM mmol/L 140 135* 136   POTASSIUM mmol/L 3.5 3.8 3.9   CHLORIDE mmol/L 104 101 100   CO2 mmol/L 22.9 23.5 24.0   BUN mg/dL 17 18 21   CREATININE mg/dL 0.97 1.08 1.16   GLUCOSE mg/dL 194* 195* 91   CALCIUM mg/dL 8.5* 8.2* 8.3*     Results from last 7 days   Lab Units 02/26/25  2100 02/26/25 2006   HSTROP T ng/L 32* 34*     Results from last 7 days   Lab Units 03/01/25  0356 02/28/25  1540 02/27/25  0610   WBC 10*3/mm3 9.70 10.95* 15.69*   HEMOGLOBIN g/dL 10.8* 10.5* 12.2*   HEMATOCRIT % 31.9* 30.7* 36.2*   PLATELETS 10*3/mm3 298 303 288                           I reviewed the patient's new clinical results.  I personally viewed and interpreted the patient's chest x-ray.        Medication Review:   acetylcysteine, 2 mL, Nebulization, BID - RT  [Held by provider] arformoterol, 15 mcg, Nebulization, BID - RT   And  [Held by provider] budesonide, 0.5 mg, Nebulization, BID - RT  azithromycin, 250 mg, Oral, Q24H  enoxaparin sodium, 40 mg, Subcutaneous, Q24H  FLUoxetine, 20 mg, Oral, Daily  gabapentin, 300 mg, Oral, TID  guaiFENesin, 1,200 mg, Oral, BID  ipratropium-albuterol, 3 mL, Nebulization, Q4H While Awake - RT  pantoprazole, 40 mg, Oral, Daily  piperacillin-tazobactam, 3.375 g, Intravenous, Q8H  predniSONE, 20 mg, Oral, BID With Meals  sodium chloride, 4 mL, Nebulization, BID - RT  sucralfate, 1 g, Oral, 4x Daily             ASSESSMENT:   Bilateral pneumonia  COPD  GERD  Hypertension  Chronic  diastolic heart failure    PLAN:  Clinically stable and pulmonary status remains stable  Oral steroids can be weaned post discharge  Oxygen requirement at baseline on 2 L nasal cannula  Aggressive pulmonary toilet with bronchodilators chest PT flutter valve Mucomyst  Mobilize ambulate  Diet per speech  Will switch to oral anti-      Tausif MD Lesly  03/02/25  12:16 EST

## 2025-03-02 NOTE — PLAN OF CARE
Problem: Adult Inpatient Plan of Care  Goal: Plan of Care Review  Outcome: Progressing  Flowsheets (Taken 3/2/2025 0126)  Progress: improving  Outcome Evaluation: pt alert and oriented, VSS, denies pain. fall precautions maintained. Plan of care ongoing  Plan of Care Reviewed With: patient     Problem: Adult Inpatient Plan of Care  Goal: Absence of Hospital-Acquired Illness or Injury  Intervention: Identify and Manage Fall Risk  Recent Flowsheet Documentation  Taken 3/2/2025 0040 by Anh Chavira RN  Safety Promotion/Fall Prevention:   clutter free environment maintained   fall prevention program maintained   lighting adjusted   room organization consistent   safety round/check completed  Taken 3/1/2025 2230 by Anh Chavira RN  Safety Promotion/Fall Prevention:   clutter free environment maintained   fall prevention program maintained   lighting adjusted   room organization consistent   safety round/check completed  Taken 3/1/2025 2035 by Anh Chavira, ANUSHA  Safety Promotion/Fall Prevention:   clutter free environment maintained   fall prevention program maintained   lighting adjusted   room organization consistent   safety round/check completed   Goal Outcome Evaluation:  Plan of Care Reviewed With: patient        Progress: improving     Outcome Evaluation: pt alert and oriented, VSS, denies pain. On 2 L NC. fall precautions maintained. Plan of care ongoing

## 2025-03-02 NOTE — PLAN OF CARE
Goal Outcome Evaluation:  Plan of Care Reviewed With: patient        Progress: improving  Outcome Evaluation: VSS; no complaints of pain or discomfort; pt constently has productive cough; pt has gotten up to the BSC X2 to have a BM with assist x2; oral antibiotics started this afternoon; safety maintained

## 2025-03-03 LAB
ANION GAP SERPL CALCULATED.3IONS-SCNC: 8.5 MMOL/L (ref 5–15)
BACTERIA SPEC AEROBE CULT: NORMAL
BACTERIA SPEC AEROBE CULT: NORMAL
BUN SERPL-MCNC: 31 MG/DL (ref 8–23)
BUN/CREAT SERPL: 25.2 (ref 7–25)
CALCIUM SPEC-SCNC: 8.4 MG/DL (ref 8.6–10.5)
CHLORIDE SERPL-SCNC: 106 MMOL/L (ref 98–107)
CO2 SERPL-SCNC: 23.5 MMOL/L (ref 22–29)
CREAT SERPL-MCNC: 1.23 MG/DL (ref 0.76–1.27)
DEPRECATED RDW RBC AUTO: 54.2 FL (ref 37–54)
EGFRCR SERPLBLD CKD-EPI 2021: 57.2 ML/MIN/1.73
ERYTHROCYTE [DISTWIDTH] IN BLOOD BY AUTOMATED COUNT: 15.5 % (ref 12.3–15.4)
GLUCOSE SERPL-MCNC: 118 MG/DL (ref 65–99)
HCT VFR BLD AUTO: 34.1 % (ref 37.5–51)
HGB BLD-MCNC: 11.1 G/DL (ref 13–17.7)
MCH RBC QN AUTO: 30.5 PG (ref 26.6–33)
MCHC RBC AUTO-ENTMCNC: 32.6 G/DL (ref 31.5–35.7)
MCV RBC AUTO: 93.7 FL (ref 79–97)
PLATELET # BLD AUTO: 379 10*3/MM3 (ref 140–450)
PMV BLD AUTO: 9.1 FL (ref 6–12)
POTASSIUM SERPL-SCNC: 3.9 MMOL/L (ref 3.5–5.2)
RBC # BLD AUTO: 3.64 10*6/MM3 (ref 4.14–5.8)
SODIUM SERPL-SCNC: 138 MMOL/L (ref 136–145)
WBC NRBC COR # BLD AUTO: 12.16 10*3/MM3 (ref 3.4–10.8)

## 2025-03-03 PROCEDURE — 94664 DEMO&/EVAL PT USE INHALER: CPT

## 2025-03-03 PROCEDURE — 97530 THERAPEUTIC ACTIVITIES: CPT

## 2025-03-03 PROCEDURE — 80048 BASIC METABOLIC PNL TOTAL CA: CPT | Performed by: STUDENT IN AN ORGANIZED HEALTH CARE EDUCATION/TRAINING PROGRAM

## 2025-03-03 PROCEDURE — 94669 MECHANICAL CHEST WALL OSCILL: CPT

## 2025-03-03 PROCEDURE — 94799 UNLISTED PULMONARY SVC/PX: CPT

## 2025-03-03 PROCEDURE — 94761 N-INVAS EAR/PLS OXIMETRY MLT: CPT

## 2025-03-03 PROCEDURE — 63710000001 PREDNISONE PER 1 MG: Performed by: INTERNAL MEDICINE

## 2025-03-03 PROCEDURE — 97166 OT EVAL MOD COMPLEX 45 MIN: CPT

## 2025-03-03 PROCEDURE — 25010000002 ENOXAPARIN PER 10 MG: Performed by: STUDENT IN AN ORGANIZED HEALTH CARE EDUCATION/TRAINING PROGRAM

## 2025-03-03 PROCEDURE — 94760 N-INVAS EAR/PLS OXIMETRY 1: CPT

## 2025-03-03 PROCEDURE — 85027 COMPLETE CBC AUTOMATED: CPT | Performed by: STUDENT IN AN ORGANIZED HEALTH CARE EDUCATION/TRAINING PROGRAM

## 2025-03-03 RX ADMIN — ACETYLCYSTEINE 2 ML: 200 SOLUTION ORAL; RESPIRATORY (INHALATION) at 11:14

## 2025-03-03 RX ADMIN — AMOXICILLIN AND CLAVULANATE POTASSIUM 500 MG: 500; 125 TABLET, FILM COATED ORAL at 17:16

## 2025-03-03 RX ADMIN — SUCRALFATE 1 G: 1 TABLET ORAL at 14:18

## 2025-03-03 RX ADMIN — FLUOXETINE 20 MG: 20 CAPSULE ORAL at 08:27

## 2025-03-03 RX ADMIN — GUAIFENESIN 1200 MG: 600 TABLET, MULTILAYER, EXTENDED RELEASE ORAL at 08:26

## 2025-03-03 RX ADMIN — IPRATROPIUM BROMIDE AND ALBUTEROL SULFATE 3 ML: .5; 3 SOLUTION RESPIRATORY (INHALATION) at 20:35

## 2025-03-03 RX ADMIN — PREDNISONE 20 MG: 20 TABLET ORAL at 08:26

## 2025-03-03 RX ADMIN — PANTOPRAZOLE SODIUM 40 MG: 40 TABLET, DELAYED RELEASE ORAL at 08:26

## 2025-03-03 RX ADMIN — SUCRALFATE 1 G: 1 TABLET ORAL at 08:26

## 2025-03-03 RX ADMIN — AZITHROMYCIN 250 MG: 250 TABLET, FILM COATED ORAL at 08:26

## 2025-03-03 RX ADMIN — SUCRALFATE 1 G: 1 TABLET ORAL at 22:04

## 2025-03-03 RX ADMIN — ENOXAPARIN SODIUM 40 MG: 100 INJECTION SUBCUTANEOUS at 14:18

## 2025-03-03 RX ADMIN — IPRATROPIUM BROMIDE AND ALBUTEROL SULFATE 3 ML: .5; 3 SOLUTION RESPIRATORY (INHALATION) at 11:13

## 2025-03-03 RX ADMIN — IPRATROPIUM BROMIDE AND ALBUTEROL SULFATE 3 ML: .5; 3 SOLUTION RESPIRATORY (INHALATION) at 15:15

## 2025-03-03 RX ADMIN — GABAPENTIN 300 MG: 300 CAPSULE ORAL at 08:26

## 2025-03-03 RX ADMIN — SUCRALFATE 1 G: 1 TABLET ORAL at 17:16

## 2025-03-03 RX ADMIN — GABAPENTIN 300 MG: 300 CAPSULE ORAL at 22:04

## 2025-03-03 RX ADMIN — GUAIFENESIN 1200 MG: 600 TABLET, MULTILAYER, EXTENDED RELEASE ORAL at 22:04

## 2025-03-03 RX ADMIN — Medication 4 ML: at 15:16

## 2025-03-03 RX ADMIN — GABAPENTIN 300 MG: 300 CAPSULE ORAL at 17:16

## 2025-03-03 RX ADMIN — AMOXICILLIN AND CLAVULANATE POTASSIUM 500 MG: 500; 125 TABLET, FILM COATED ORAL at 22:04

## 2025-03-03 RX ADMIN — FUROSEMIDE 20 MG: 20 TABLET ORAL at 08:26

## 2025-03-03 RX ADMIN — FUROSEMIDE 20 MG: 20 TABLET ORAL at 22:04

## 2025-03-03 RX ADMIN — Medication 4 ML: at 07:07

## 2025-03-03 RX ADMIN — IPRATROPIUM BROMIDE AND ALBUTEROL SULFATE 3 ML: .5; 3 SOLUTION RESPIRATORY (INHALATION) at 07:05

## 2025-03-03 RX ADMIN — PREDNISONE 20 MG: 20 TABLET ORAL at 17:17

## 2025-03-03 RX ADMIN — ACETYLCYSTEINE 2 ML: 200 SOLUTION ORAL; RESPIRATORY (INHALATION) at 20:35

## 2025-03-03 RX ADMIN — AMOXICILLIN AND CLAVULANATE POTASSIUM 500 MG: 500; 125 TABLET, FILM COATED ORAL at 08:26

## 2025-03-03 NOTE — PLAN OF CARE
Goal Outcome Evaluation:  Plan of Care Reviewed With: patient        Progress: improving  Outcome Evaluation: Pt is 85 y/o M admitted to [facility] on 2/26/25 with SOB, cough, fever - found to have PNA. PMHx includes: COPD, Afib, HTN. At baseline pt is a LTC resident at [facility]. Pt reporting he normally gets up to the bathroom and to a chair with assist but level of assist unclear. Pt not currently recieiving therapy. Pt presents to OT requiring mod A for bed mob, mod-max Ax1/ rwx to stand EOB and min Ax1/ rwx to take a couple side steps. Sba-Cga for sitting bal while using urinal with Sba. Pt would benefit from skilled OT to maximize patient safety and promote (I) w/ ADLs/ xfers and address deficits act tolerance/ endurance, balance, and strength. Rec return to snf at d/c. OT will continue to monitor.    Anticipated Discharge Disposition (OT): skilled nursing facility

## 2025-03-03 NOTE — THERAPY TREATMENT NOTE
Patient Name: Tony Leonard  : 1940    MRN: [id number]                              Today's Date: 3/3/2025       Admit Date: 2025    Visit Dx:     ICD-10-CM ICD-9-CM   1. Pneumonia of left lower lobe due to infectious organism  J18.9 486   2. COPD with acute exacerbation  J44.1 491.21   3. Chronic diastolic CHF (congestive heart failure)  I50.32 428.32     428.0     Patient Active Problem List   Diagnosis    Thrush, oral    ADD (attention deficit disorder)    Hemorrhoids    Bronchiectasis with acute lower respiratory infection    Diverticul disease small and large intestine, no perforati or abscess    Benign non-nodular prostatic hyperplasia without lower urinary tract symptoms    Gastroesophageal reflux disease    Malaise and fatigue    GERD (gastroesophageal reflux disease)    Environmental allergies    Hemoptysis    Dyslipidemia    Primary osteoarthritis involving multiple joints    Pneumonia of right lower lobe due to infectious organism    Abnormal EKG    COPD (chronic obstructive pulmonary disease)    Mild malnutrition    Acute on chronic respiratory failure with hypoxia    Fracture, intertrochanteric, right femur, closed, initial encounter    Chronic diastolic CHF (congestive heart failure)    Hematoma of frontal scalp    Postoperative anemia due to acute blood loss    Urinary retention    Hemorrhagic disorder due to circulating anticoagulants    History of fracture of right hip    Closed fracture of right hip with routine healing    Chronic low back pain with sciatica    Change in bowel function    Rectal bleeding    Prostate cancer    On home oxygen therapy    Acute viral bronchitis    Neuropathy    Plantar fasciitis    HTN (hypertension)    Bilateral lower extremity edema    Microscopic hematuria    Acute midline low back pain with right-sided sciatica    Spinal stenosis, lumbar region with neurogenic claudication    Compression deformity of vertebra    Rectal bleed    Esophagitis     Angiectasia    Hiatal hernia    Overweight (BMI 25.0-29.9)    Leukocytosis    Bilateral hip pain    Elevated troponin level not due myocardial infarction    Nocturnal hypoxia    Pneumonia of right upper lobe due to infectious organism    NSTEMI (non-ST elevated myocardial infarction)    Chronic respiratory failure with hypoxia    Paroxysmal atrial fibrillation    Acute exacerbation of chronic obstructive pulmonary disease (COPD)    Anemia    Non-compliant patient    Hypokalemia    Spondylolisthesis of lumbar region    Elevated troponin    Rhabdomyolysis    Multiple contusions    Fall    Dizziness    Contusion of hip    Pulmonary embolism    COPD with acute exacerbation    Influenza A    Altered mental status    Pneumonia     Past Medical History:   Diagnosis Date    ADHD (attention deficit hyperactivity disorder)     Altered mental status, unspecified     Anemia, unspecified     Attention-deficit hyperactivity disorder, unspecified type     Benign prostatic hyperplasia without lower urinary tract symptoms     Body mass index 26.0-26.9, adult     Bronchiectasis     Bronchiectasis, uncomplicated     Chest pain, unspecified     CHF (congestive heart failure)     Chronic diastolic (congestive) heart failure     Chronic respiratory failure with hypoxia     Cognitive communication deficit     Colon polyp     COPD (chronic obstructive pulmonary disease)     Depression, unspecified     Diaphragmatic hernia without obstruction or gangrene     Diverticulosis     Diverticulosis of both small and large intestine without perforation or abscess without bleeding     Dorsalgia, unspecified     Encounter for immunization     Eosinophilic asthma     Essential (primary) hypertension     Gastroesophageal reflux disease without esophagitis     Hard of hearing     Hyperlipidemia, unspecified     Hypertensive heart disease with congestive heart failure     Hypoxemia     Influenza due to other identified influenza virus with other  respiratory manifestations     Insomnia, unspecified     Localized edema     Mild protein-calorie malnutrition     Muscle weakness (generalized)     On home oxygen therapy     2 LITERS    Other specified abnormal findings of blood chemistry     Overweight     Paroxysmal atrial fibrillation     Personal history of malignant neoplasm of prostate     Pneumonia     Polyneuropathy, unspecified     Prostate cancer 04/22/2019    Restless leg syndrome     Sleep related hypoventilation in conditions classified elsewhere     Spinal stenosis, lumbar region with neurogenic claudication 08/02/2022    Spinal stenosis, lumbar region, with neurogenic claudication     Weakness      Past Surgical History:   Procedure Laterality Date    BRONCHOSCOPY N/A 04/30/2016    Procedure: BRONCHOSCOPY with BAL of right lower lobe and left  lower lobe.  ;  Surgeon: Nando Diaz MD;  Location: [facility] ENDOSCOPY;  Service:     BRONCHOSCOPY N/A 04/28/2017    Procedure: BRONCHOSCOPY;  Surgeon: Katharina Salter MD;  Location: [facility] ENDOSCOPY;  Service:     BRONCHOSCOPY Bilateral 06/29/2017    Procedure: BRONCHOSCOPY WITH WASHINGS;  Surgeon: Katharina Salter MD;  Location: [facility] ENDOSCOPY;  Service:     BRONCHOSCOPY N/A 01/22/2018    Procedure: BRONCHOSCOPY AT BEDSIDE with BAL;  Surgeon: Katharina Salter MD;  Location: [facility] ENDOSCOPY;  Service:     BRONCHOSCOPY N/A 01/30/2018    Procedure: BRONCHOSCOPY with BAL and washing;  Surgeon: Shreyas Wild MD;  Location: [facility] ENDOSCOPY;  Service:     BRONCHOSCOPY Bilateral 04/24/2018    Procedure: BRONCHOSCOPY WITH WASHING;  Surgeon: Katharina Salter MD;  Location: [facility] ENDOSCOPY;  Service: Pulmonary    BRONCHOSCOPY N/A 12/28/2019    Procedure: BRONCHOSCOPY with bilateral washing;  Surgeon: Katharina Salter MD;  Location: [facility] ENDOSCOPY;  Service: Pulmonary    BRONCHOSCOPY Bilateral 01/04/2023    Procedure: BRONCHOSCOPY with lavage;  Surgeon: Katharina Salter MD;  Location: [facility] ENDOSCOPY;  Service:  Pulmonary;  Laterality: Bilateral;  mucus plugging    CARDIAC CATHETERIZATION N/A 01/29/2023    Procedure: Left Heart Cath;  Surgeon: Johnnie Ang MD;  Location: [facility] CATH INVASIVE LOCATION;  Service: Cardiology;  Laterality: N/A;    CARDIAC CATHETERIZATION N/A 01/29/2023    Procedure: Coronary angiography;  Surgeon: Johnnie Ang MD;  Location[facility] CATH [facility]ASIVE LOC[facility];  Service: Cardiology;  Laterality: N/A;    COLONOSCOPY  05/17/2013    eh, ih, tort, sig tics    COLONOSCOPY N/A 05/10/2019    Non-thrombosed external hemorrhoids found on perianal exam, Diverticulosis, Tortuous colon, One 5 mm polyp in the mid ascending colon, IH. Path: Tubular adenoma.     COLONOSCOPY N/A 09/24/2022    Procedure: COLONOSCOPY to cecum and TI with argon plasma coagulation.;  Surgeon: Bruce Black MD;  [facility] END[facility];  Service: Gastroenterology;  Laterality: N/A;  pre- GI bleeding  post- radiation proctitis, diverticulosis    ENDOSCOPY  03/19/2015    z line irreg, hh    ENDOSCOPY N/A 09/24/2022    Procedure: ESOPHAGOGASTRODUODENOSCOPY;  Surgeon: Bruce Black MD;  [facility] END[facility];  Service: Gastroenterology;  Laterality: N/A;  pre- GI bleeding  post- esophagitis, hiatal hernia    HIP OPEN REDUCTION Right 01/17/2018    Procedure: HIP OPEN REDUCTION INTERNAL FIXATION WITH DYNAMIC HIP SCREW;  Surgeon: Les Black MD;  Location: [facility] MAIN OR;  Service:     SPINE SURGERY      TONSILLECTOMY      TOTAL HIP ARTHROPLASTY REVISION Right 09/23/2019    Procedure: HIP  REVISION RIGHT;  Surgeon: Isauro Warner MD;  Location: [facility] MAIN OR;  Service: Orthopedics      General Information       Row Name 03/03/25 1600          Physical Therapy Time and Intention    Document Type therapy note (daily note)  -PH     Mode of Treatment physical therapy  -PH       Row Name 03/03/25 1600          General Information    Existing Precautions/Restrictions fall  -PH     Barriers to Rehab previous  functional deficit;hearing deficit;medically complex  -PH       Row Name 03/03/25 1600          Cognition    Orientation Status (Cognition) oriented x 3  -PH       Row Name 03/03/25 1600          Safety Issues/Impairments Affecting Functional Mobility    Impairments Affecting Function (Mobility) endurance/activity tolerance;balance;strength;pain  -PH     Comment, Safety Issues/Impairments (Mobility) gt belt and non skid socks donned  -PH               User Key  (r) = Recorded By, (t) = Taken By, (c) = Cosigned By      Initials Name Provider Type    PH Terri Hubbard, PTA Physical Therapist Assistant                   Mobility       Row Name 03/03/25 1600          Bed Mobility    Bed Mobility supine-sit  -PH     Supine-Sit [place] (Bed Mobility) minimum assist (75% patient effort);verbal cues;nonverbal cues (demo/gesture)  -PH     Sit-Supine [place] (Bed Mobility) moderate assist (50% patient effort);verbal cues;nonverbal cues (demo/gesture)  -PH     Comment, (Bed Mobility) significant incr time to EOB  -PH       Row Name 03/03/25 1600          Sit-Stand Transfer    Sit-Stand [place] (Transfers) moderate assist (50% patient effort);minimum assist (75% patient effort);2 person assist;verbal cues;nonverbal cues (demo/gesture)  -PH     Assistive Device (Sit-Stand Transfers) walker, front-wheeled  -PH     Comment, (Sit-Stand Transfer) sit to stand 2x from EOB mod A x 2 and from BSC min A x 2.  -PH       Row Name 03/03/25 1600          Gait/Stairs (Locomotion)    [place] Level (Gait) contact guard;minimum assist (75% patient effort);2 person assist;verbal cues;nonverbal cues (demo/gesture)  -PH     Assistive Device (Gait) walker, front-wheeled  -PH     Distance in Feet (Gait) 4  4' x 2  -PH     Deviations/Abnormal Patterns (Gait) norm decreased;gait speed decreased;stride length decreased  -PH     Bilateral Gait Deviations forward flexed posture;heel strike decreased  -PH     [place]  Level (Stairs) unable to assess  -PH     Comment, (Gait/Stairs) slow and shuffling w/ pt limited by activity intolerance and also  freq coughing then requiring younker  -PH               User Key  (r) = Recorded By, (t) = Taken By, (c) = Cosigned By      Initials Name Provider Type     Terri Hubbard PTA Physical Therapist Assistant                   Obj/Interventions       Row Name 03/03/25 1604          Balance    Balance Assessment sitting static balance;sitting dynamic balance;standing static balance  -PH     Static Sitting Balance standby assist  -PH     Dynamic Sitting Balance standby assist  -PH     Static Standing Balance minimal assist;contact guard;verbal cues  -PH     Position/Device Used, Standing Balance walker, front-wheeled  -PH               User Key  (r) = Recorded By, (t) = Taken By, (c) = Cosigned By      Initials Name Provider Type     Terri Hubbard PTA Physical Therapist Assistant                   Goals/Plan    No documentation.                  Clinical Impression       Row Name 03/03/25 1604          Pain    Pretreatment Pain Rating 6/10  -PH     Posttreatment Pain Rating 6/10  -PH     Pain Management Interventions exercise or physical activity utilized  -PH     Response to Pain Interventions activity level improved;mobility function improved;activity participation with tolerable pain  -PH     Pre/Posttreatment Pain Comment c/o sore on bottom  -PH       Row Name 03/03/25 1604          Plan of Care Review    Plan of Care Reviewed With patient  -PH     Progress improving  -PH     Outcome Evaluation Pt was seen for PT tx this PM. Pt was in bed and req significant incr time to EOB w/ pt coughing and req younker. Pt sat up to EOB w/ min A although leaned on elbows reporting significant incr in wound on bottom if sitting upright. Pt completed sitting to full upright when cued then stood w/ mod A x 2. Pt amb 4' to BSC then back to EOB w/ CGA/min A x 2 w/ use of fww. Pt was  slow and shuffling w/ no overt LOB. Frequent coughing and activity intolerance limiting. Pt returned to bed w/ mod A.  -PH       Row Name 03/03/25 1604          Vital Signs    O2 Delivery Pre Treatment room air  -PH     O2 Delivery Intra Treatment room air  -PH     O2 Delivery Post Treatment room air  -PH       Row Name 03/03/25 1604          Positioning and Restraints    Pre-Treatment Position in bed  -PH     Post Treatment Position bed  -PH     In Bed notified nsg;fowlers;call light within reach;encouraged to call for assist;exit alarm on  -PH               User Key  (r) = Recorded By, (t) = Taken By, (c) = Cosigned By      Initials Name Provider Type    PH Terri Hubbard PTA Physical Therapist Assistant                   Outcome Measures       Row Name 03/03/25 1607          How much help from another person do you currently need...    Turning from your back to your side while in flat bed without using bedrails? 3  -PH     Moving from lying on back to sitting on the side of a flat bed without bedrails? 3  -PH     Moving to and from a bed to a chair (including a wheelchair)? 3  -PH     Standing up from a chair using your arms (e.g., wheelchair, bedside chair)? 2  -PH     Climbing 3-5 steps with a railing? 2  -PH     To walk in hospital room? 2  -PH     AM-PAC 6 Clicks Score (PT) 15  -PH     Highest Level of Mobility Goal 4 --> Transfer to chair/commode  -PH       Row Name 03/03/25 1607 03/03/25 1446       Functional Assessment    Outcome Measure Options AM-PAC 6 Clicks Basic Mobility (PT)  -PH AM-PAC 6 Clicks Daily Activity (OT)  -PP              User Key  (r) = Recorded By, (t) = Taken By, (c) = Cosigned By      Initials Name Provider Type    PH Terri Hubbard PTA Physical Therapist Assistant    Jair Ashley OT Occupational Therapist                                 Physical Therapy Education       Title: PT OT SLP Therapies (In Progress)       Topic: Physical Therapy (In Progress)        Point: Mobility training (In Progress)       Learning Progress Summary            Patient Acceptance, E,TB, NR by  at 3/3/2025 1607    Acceptance, E,TB, VU,NR by ST at 2/28/2025 1315                      Point: Home exercise program (Not Started)       Learner Progress:  Not documented in this visit.              Point: Body mechanics (In Progress)       Learning Progress Summary            Patient Acceptance, E,TB, NR by  at 3/3/2025 1607                      Point: Precautions (In Progress)       Learning Progress Summary            Patient Acceptance, E,TB, NR by  at 3/3/2025 1607    Acceptance, E,TB, VU,NR by ST at 2/28/2025 1315                                      User Key       Initials Effective Dates Name Provider Type Discipline     06/16/21 -  Terri Hubbard PTA Physical Therapist Assistant PT     09/22/22 -  Nemo King PT Physical Therapist PT                  PT Recommendation and Plan     Progress: improving  Outcome Evaluation: Pt was seen for PT tx this PM. Pt was in bed and req significant incr time to EOB w/ pt coughing and req younker. Pt sat up to EOB w/ min A although leaned on elbows reporting significant incr in wound on bottom if sitting upright. Pt completed sitting to full upright when cued then stood w/ mod A x 2. Pt amb 4' to BSC then back to EOB w/ CGA/min A x 2 w/ use of fww. Pt was slow and shuffling w/ no overt LOB. Frequent coughing and activity intolerance limiting. Pt returned to bed w/ mod A.     Time Calculation:         PT Charges       Row Name 03/03/25 1608             Time Calculation    Start Time 1440  -PH      Stop Time 1510  -PH      Time Calculation (min) 30 min  -PH      PT Received On 03/03/25  -PH      PT - Next Appointment 03/05/25  -PH         Timed Charges    93636 - PT Therapeutic Activity Minutes 30  -PH         Total Minutes    Timed Charges Total Minutes 30  -PH       Total Minutes 30  -PH                User Key  (r) =  Recorded By, (t) = Taken By, (c) = Cosigned By      Initials Name Provider Type    Terri García PTA Physical Therapist Assistant                  Therapy Charges for Today       Code Description Service Date Service Provider Modifiers Qty    11626058765 HC PT THERAPEUTIC ACT EA 15 MIN 3/3/2025 Terri Hubbard PTA GP 2    22065218493 HC PT THER SUPP EA 15 MIN 3/3/2025 Terri Hubbard PTA GP 2            PT G-Codes  Outcome Measure Options: AM-PAC 6 Clicks Basic Mobility (PT)  AM-PAC 6 Clicks Score (PT): 15  AM-PAC 6 Clicks Score (OT): 17  PT Discharge Summary  Anticipated Discharge Disposition (PT): extended care facility    Terri Hubbard PTA  3/3/2025

## 2025-03-03 NOTE — CASE MANAGEMENT/SOCIAL WORK
Continued Stay Note  [facility]     Patient Name: Tony Leonard  MRN: [id number]  Today's Date: 3/3/2025    Admit Date: 2/26/2025    Plan: Return to [facility], will need ambulance transport   Discharge Plan       Row Name 03/03/25 1537       Plan    Plan Return to [facility], will need ambulance transport    Plan [place] met with pt at bedside. Informed pt that [facility] is following for dc needs. Pt is on a medicaid bed hold. Will need transportation arrange                   Discharge Codes    No documentation.                 Expected Discharge Date and Time       Expected Discharge Date Expected Discharge Time    Mar 3, 2025               Sarita Stratton RN

## 2025-03-03 NOTE — PLAN OF CARE
Goal Outcome Evaluation:  Plan of Care Reviewed With: patient        Progress: improving  Outcome Evaluation: Pt was seen for PT tx this PM. Pt was in bed and req significant incr time to EOB w/ pt coughing and req younker. Pt sat up to EOB w/ min A although leaned on elbows reporting significant incr in wound on bottom if sitting upright. Pt completed sitting to full upright when cued then stood w/ mod A x 2. Pt amb 4' to BSC then back to EOB w/ CGA/min A x 2 w/ use of fww. Pt was slow and shuffling w/ no overt LOB. Frequent coughing and activity intolerance limiting. Pt returned to bed w/ mod A.    Anticipated Discharge Disposition (PT): extended care facility

## 2025-03-03 NOTE — PLAN OF CARE
Goal Outcome Evaluation:              Outcome Evaluation: VSS. RESTED AT LONG INTERVALS WITHOUT C/O'S.

## 2025-03-03 NOTE — PROGRESS NOTES
Name: Tony Leonard ADMIT: 2025   : 1940  PCP: Alfonso Goldstein MD    MRN: [id number] LOS: 5 days   AGE/SEX: 84 y.o. male  ROOM: [facility]     Subjective     No new complaints today    Objective   Objective   Vital Signs  Temp:  [97.1 °F (36.2 °C)-97.5 °F (36.4 °C)] 97.3 °F (36.3 °C)  Heart Rate:  [] 90  Resp:  [16-18] 18  BP: (126-144)/(61-85) 144/75  SpO2:  [87 %-100 %] 99 %  on  Flow (L/min) (Oxygen Therapy):  [2] 2;   Device (Oxygen Therapy): nasal cannula  Body mass index is 26.01 kg/m².  Physical Exam  Constitutional:       General: He is not in acute distress.     Appearance: He is ill-appearing.   HENT:      Head: Normocephalic and atraumatic.      Mouth/Throat:      Mouth: Mucous membranes are moist. Mucous membranes are dry.   Eyes:      Extraocular Movements: Extraocular movements intact.      Pupils: Pupils are equal, round, and reactive to light.   Cardiovascular:      Rate and Rhythm: Normal rate and regular rhythm.   Pulmonary:      Effort: Pulmonary effort is normal. No respiratory distress.   Abdominal:      General: There is no distension.      Palpations: Abdomen is soft.   Skin:     General: Skin is warm and dry.   Neurological:      General: No focal deficit present.      Mental Status: He is alert and oriented to person, place, and time.     Exam reviewed and updated on 3/3/25    Results Review     I reviewed the patient's new clinical results.  Results from last 7 days   Lab Units 25  0251 25  0356 25  1540 25  0610   WBC 10*3/mm3 12.16* 9.70 10.95* 15.69*   HEMOGLOBIN g/dL 11.1* 10.8* 10.5* 12.2*   PLATELETS 10*3/mm3 379 298 303 288     Results from last 7 days   Lab Units 25  0251 25  0356 25  1540 25  0610   SODIUM mmol/L 138 140 135* 136   POTASSIUM mmol/L 3.9 3.5 3.8 3.9   CHLORIDE mmol/L 106 104 101 100   CO2 mmol/L 23.5 22.9 23.5 24.0   BUN mg/dL 31* 17 18 21   CREATININE mg/dL 1.23 0.97 1.08 1.16   GLUCOSE  "mg/dL 118* 194* 195* 91   Estimated Creatinine Clearance: 51.6 mL/min (by C-G formula based on SCr of 1.23 mg/dL).  Results from last 7 days   Lab Units 02/26/25  2100   ALBUMIN g/dL 2.3*   BILIRUBIN mg/dL 1.0   ALK PHOS U/L 65   AST (SGOT) U/L 16   ALT (SGPT) U/L 20     Results from last 7 days   Lab Units 03/03/25  0251 03/01/25  0356 02/28/25  1540 02/27/25  0610 02/26/25  2100   CALCIUM mg/dL 8.4* 8.5* 8.2* 8.3* 8.5*   ALBUMIN g/dL  --   --   --   --  2.3*     Results from last 7 days   Lab Units 02/26/25 2006   PROCALCITONIN ng/mL 0.47*   LACTATE mmol/L 1.4     COVID19   Date Value Ref Range Status   02/26/2025 Not Detected Not Detected - Ref. Range Final   02/16/2025 Not Detected Not Detected - Ref. Range Final     No results found for: \"HGBA1C\", \"POCGLU\"  Results for orders placed or performed during the hospital encounter of 02/26/25   Blood Culture - Blood, Arm, Left    Specimen: Arm, Left; Blood   Result Value Ref Range    Blood Culture No growth at 4 days          CT Angiogram Chest  CT ANGIOGRAM OF THE CHEST. MULTIPLE CORONAL, SAGITTAL, AND 3-D  RECONSTRUCTIONS.     HISTORY: Acute hypoxic respiratory failure     TECHNIQUE: Radiation dose reduction techniques were utilized, including  automated exposure control and exposure modulation based on body size.   CT angiogram of the chest was performed. Multiple coronal, sagittal, and  3-D reconstruction images were obtained.      COMPARISON: CT chest 2/17/2025     FINDINGS:      Please note this examination is nondiagnostic for evaluation for  pulmonary arterial embolism given contrast timing with contrast most  dense within the left atrium with Hounsfield units of 228 versus  contrast within the left main pulmonary artery of 140. Pulmonary  arterial embolism cannot be excluded and if there is continued clinical  concern for pulmonary arterial embolism, recommend repeat CTA chest to  complete evaluation.     Cervical fusion hardware is only partially seen and " cannot be evaluated.  No suspicious lytic or blastic osseous lesions.     Evaluation is suboptimal due to beam hardening and streak artifact from  patient's arms being along their side. Subcentimeter hepatic lesions are  too small to characterize. There is a small to moderate hiatal hernia.     No hilar or axillary adenopathy by size criteria. Subcarinal adenopathy  measures 1.4 cm (previously 0.9 cm on 2/17/2025. Borderline enlarged  node posterior to the left 1.1 cm short axis mention (previously 0.5 cm.  Trace pericardial effusion or thickening. No pneumothorax. Endobronchial  filling defects are present in the bilateral lung bases. There is new  moderate to extensive pulmonary consolidation and opacification  bilaterally, most notably within the mid to lower lungs, with associated  tree-in-bud nodularity. Findings most suggestive of aspiration pneumonia  and correlation with patient history is recommended to confirm  appropriate etiology. Given the masslike appearance and endobronchial  filling defects, follow-up with chest CT in 8 weeks recommended to  ensure resolution exclude the possibility of malignancy. Above-mentioned  mediastinal adenopathy which is new since two 1725 can be be followed up  at this time as well.     Compression deformity of the T1, T12 and L1 vertebral bodies vertebral  body, as before.     This report was finalized on 2/28/2025 4:10 PM by Dr. Salvador Bates M.D  on Workstation: [id number]       Scheduled Medications  acetylcysteine, 2 mL, Nebulization, BID - RT  amoxicillin-clavulanate, 1 tablet, Oral, TID  [Held by provider] arformoterol, 15 mcg, Nebulization, BID - RT   And  [Held by provider] budesonide, 0.5 mg, Nebulization, BID - RT  enoxaparin sodium, 40 mg, Subcutaneous, Q24H  FLUoxetine, 20 mg, Oral, Daily  furosemide, 20 mg, Oral, BID  gabapentin, 300 mg, Oral, TID  guaiFENesin, 1,200 mg, Oral, BID  ipratropium-albuterol, 3 mL, Nebulization, Q4H While Awake - RT  pantoprazole,  40 mg, Oral, Daily  predniSONE, 20 mg, Oral, BID With Meals  sodium chloride, 4 mL, Nebulization, BID - RT  sucralfate, 1 g, Oral, 4x Daily    Infusions   Diet  Diet: Cardiac; Healthy Heart (2-3 Na+); Fluid Consistency: Thin (IDDSI 0)       Assessment/Plan     Active Hospital Problems    Diagnosis  POA    **Pneumonia [J18.9]  Yes    HTN (hypertension) [I10]  Yes    Chronic diastolic CHF (congestive heart failure) [I50.32]  Yes    COPD (chronic obstructive pulmonary disease) [J44.9]  Yes    GERD (gastroesophageal reflux disease) [K21.9]  Yes      Resolved Hospital Problems   No resolved problems to display.       84 y.o. male admitted with Pneumonia.    Pneumonia  Lactic acid wnl. Procal 0.47  Follow cultures. RVP negative. Transitioned to PO augmentin  He follows with Dr. Bob and would like LPC to see in consultation  Obtain CT Angiogram chest-nondiagnostic for pulmonary embolism due to contrast timing however it did show moderate extensive pulmonary consolidation and opacification bilaterally concerning for aspiration pneumonia.  Will have swallow evaluation done- regular diet with thins recommended   On steroid taper      COPD   Resume home inhalers     GERD  PPI     Hypertension  Blood pressures acceptable      Chronic diastolic heart failure  EF 56-60%  Resume lasix    PT/OT  May need SNF      Lovenox or DVT prophylaxis.  Full code.  Discussed with patient.  Anticipate discharge to be determined       Alberto Tomlinson MD  [facility]  03/03/25  12:26 EST

## 2025-03-03 NOTE — THERAPY EVALUATION
Patient Name: Tony Leonard  : 1940    MRN: [id number]                              Today's Date: 3/3/2025       Admit Date: 2025    Visit Dx:     ICD-10-CM ICD-9-CM   1. Pneumonia of left lower lobe due to infectious organism  J18.9 486   2. COPD with acute exacerbation  J44.1 491.21   3. Chronic diastolic CHF (congestive heart failure)  I50.32 428.32     428.0     Patient Active Problem List   Diagnosis    Thrush, oral    ADD (attention deficit disorder)    Hemorrhoids    Bronchiectasis with acute lower respiratory infection    Diverticul disease small and large intestine, no perforati or abscess    Benign non-nodular prostatic hyperplasia without lower urinary tract symptoms    Gastroesophageal reflux disease    Malaise and fatigue    GERD (gastroesophageal reflux disease)    Environmental allergies    Hemoptysis    Dyslipidemia    Primary osteoarthritis involving multiple joints    Pneumonia of right lower lobe due to infectious organism    Abnormal EKG    COPD (chronic obstructive pulmonary disease)    Mild malnutrition    Acute on chronic respiratory failure with hypoxia    Fracture, intertrochanteric, right femur, closed, initial encounter    Chronic diastolic CHF (congestive heart failure)    Hematoma of frontal scalp    Postoperative anemia due to acute blood loss    Urinary retention    Hemorrhagic disorder due to circulating anticoagulants    History of fracture of right hip    Closed fracture of right hip with routine healing    Chronic low back pain with sciatica    Change in bowel function    Rectal bleeding    Prostate cancer    On home oxygen therapy    Acute viral bronchitis    Neuropathy    Plantar fasciitis    HTN (hypertension)    Bilateral lower extremity edema    Microscopic hematuria    Acute midline low back pain with right-sided sciatica    Spinal stenosis, lumbar region with neurogenic claudication    Compression deformity of vertebra    Rectal bleed    Esophagitis     Angiectasia    Hiatal hernia    Overweight (BMI 25.0-29.9)    Leukocytosis    Bilateral hip pain    Elevated troponin level not due myocardial infarction    Nocturnal hypoxia    Pneumonia of right upper lobe due to infectious organism    NSTEMI (non-ST elevated myocardial infarction)    Chronic respiratory failure with hypoxia    Paroxysmal atrial fibrillation    Acute exacerbation of chronic obstructive pulmonary disease (COPD)    Anemia    Non-compliant patient    Hypokalemia    Spondylolisthesis of lumbar region    Elevated troponin    Rhabdomyolysis    Multiple contusions    Fall    Dizziness    Contusion of hip    Pulmonary embolism    COPD with acute exacerbation    Influenza A    Altered mental status    Pneumonia     Past Medical History:   Diagnosis Date    ADHD (attention deficit hyperactivity disorder)     Altered mental status, unspecified     Anemia, unspecified     Attention-deficit hyperactivity disorder, unspecified type     Benign prostatic hyperplasia without lower urinary tract symptoms     Body mass index 26.0-26.9, adult     Bronchiectasis     Bronchiectasis, uncomplicated     Chest pain, unspecified     CHF (congestive heart failure)     Chronic diastolic (congestive) heart failure     Chronic respiratory failure with hypoxia     Cognitive communication deficit     Colon polyp     COPD (chronic obstructive pulmonary disease)     Depression, unspecified     Diaphragmatic hernia without obstruction or gangrene     Diverticulosis     Diverticulosis of both small and large intestine without perforation or abscess without bleeding     Dorsalgia, unspecified     Encounter for immunization     Eosinophilic asthma     Essential (primary) hypertension     Gastroesophageal reflux disease without esophagitis     Hard of hearing     Hyperlipidemia, unspecified     Hypertensive heart disease with congestive heart failure     Hypoxemia     Influenza due to other identified influenza virus with other  respiratory manifestations     Insomnia, unspecified     Localized edema     Mild protein-calorie malnutrition     Muscle weakness (generalized)     On home oxygen therapy     2 LITERS    Other specified abnormal findings of blood chemistry     Overweight     Paroxysmal atrial fibrillation     Personal history of malignant neoplasm of prostate     Pneumonia     Polyneuropathy, unspecified     Prostate cancer 04/22/2019    Restless leg syndrome     Sleep related hypoventilation in conditions classified elsewhere     Spinal stenosis, lumbar region with neurogenic claudication 08/02/2022    Spinal stenosis, lumbar region, with neurogenic claudication     Weakness      Past Surgical History:   Procedure Laterality Date    BRONCHOSCOPY N/A 04/30/2016    Procedure: BRONCHOSCOPY with BAL of right lower lobe and left  lower lobe.  ;  Surgeon: Nando Diaz MD;  Location: [facility] ENDOSCOPY;  Service:     BRONCHOSCOPY N/A 04/28/2017    Procedure: BRONCHOSCOPY;  Surgeon: Katharina Salter MD;  Location: [facility] ENDOSCOPY;  Service:     BRONCHOSCOPY Bilateral 06/29/2017    Procedure: BRONCHOSCOPY WITH WASHINGS;  Surgeon: Katharina Salter MD;  Location: [facility] ENDOSCOPY;  Service:     BRONCHOSCOPY N/A 01/22/2018    Procedure: BRONCHOSCOPY AT BEDSIDE with BAL;  Surgeon: Katharina Salter MD;  Location: [facility] ENDOSCOPY;  Service:     BRONCHOSCOPY N/A 01/30/2018    Procedure: BRONCHOSCOPY with BAL and washing;  Surgeon: Shreyas Wild MD;  Location: [facility] ENDOSCOPY;  Service:     BRONCHOSCOPY Bilateral 04/24/2018    Procedure: BRONCHOSCOPY WITH WASHING;  Surgeon: Katharina Salter MD;  Location: [facility] ENDOSCOPY;  Service: Pulmonary    BRONCHOSCOPY N/A 12/28/2019    Procedure: BRONCHOSCOPY with bilateral washing;  Surgeon: Katharina Salter MD;  Location: [facility] ENDOSCOPY;  Service: Pulmonary    BRONCHOSCOPY Bilateral 01/04/2023    Procedure: BRONCHOSCOPY with lavage;  Surgeon: Katharina Salter MD;  Location: [facility] ENDOSCOPY;  Service:  Pulmonary;  Laterality: Bilateral;  mucus plugging    CARDIAC CATHETERIZATION N/A 01/29/2023    Procedure: Left Heart Cath;  Surgeon: Johnnie Ang MD;  Location: [facility] CATH INVASIVE LOCATION;  Service: Cardiology;  Laterality: N/A;    CARDIAC CATHETERIZATION N/A 01/29/2023    Procedure: Coronary angiography;  Surgeon: Johnnie Ang MD;  Location: [facility] CATH INVASIVE LOCATION;  Service: Cardiology;  Laterality: N/A;    COLONOSCOPY  05/17/2013    eh, ih, tort, sig tics    COLONOSCOPY N/A 05/10/2019    Non-thrombosed external hemorrhoids found on perianal exam, Diverticulosis, Tortuous colon, One 5 mm polyp in the mid ascending colon, IH. Path: Tubular adenoma.     COLONOSCOPY N/A 09/24/2022    Procedure: COLONOSCOPY to cecum and TI with argon plasma coagulation.;  Surgeon: Bruce Black MD;  Location: [facility] ENDOSCOPY;  Service: Gastroenterology;  Laterality: N/A;  pre- GI bleeding  post- radiation proctitis, diverticulosis    ENDOSCOPY  03/19/2015    z line irreg, hh    ENDOSCOPY N/A 09/24/2022    Procedure: ESOPHAGOGASTRODUODENOSCOPY;  Surgeon: Bruce Black MD;  Location:  [facility] ENDOSCOPY;  Service: Gastroenterology;  Laterality: N/A;  pre- GI bleeding  post- esophagitis, hiatal hernia    HIP OPEN REDUCTION Right 01/17/2018    Procedure: HIP OPEN REDUCTION INTERNAL FIXATION WITH DYNAMIC HIP SCREW;  Surgeon: Les Black MD;  Location: [facility] MAIN OR;  Service:     SPINE SURGERY      TONSILLECTOMY      TOTAL HIP ARTHROPLASTY REVISION Right 09/23/2019    Procedure: HIP  REVISION RIGHT;  Surgeon: Isauro Warner MD;  Location: [facility] MAIN OR;  Service: Orthopedics      General Information       Row Name 03/03/25 1448          OT Time and Intention    Document Type evaluation  -PP     Mode of Treatment individual therapy;occupational therapy  -PP       Row Name 03/03/25 1448          General Information    Patient Profile Reviewed yes  -PP     Prior Level of Function min assist:;mod  assist:;ADL's;transfer;w/c or scooter  Pt PLOF unclear, pt from LTC and recieves some level of assist for ADLs and xfers.  -PP     Existing Precautions/Restrictions fall  -PP     Barriers to Rehab previous functional deficit;medically complex  -PP       Row Name 03/03/25 1448          Living Environment    People in Home facility resident  [facility] resident, [facility]  -PP       Row Name 03/03/25 1448          Cognition    Orientation Status (Cognition) oriented to;person;place;situation  -PP       Row Name 03/03/25 1448          Safety Issues/Impairments Affecting Functional Mobility    Impairments Affecting Function (Mobility) balance;endurance/activity tolerance;postural/trunk control;pain  -PP     Comment, Safety Issues/Impairments (Mobility) gait belt and non skid socks worn for safety  -PP               User Key  (r) = Recorded By, (t) = Taken By, (c) = Cosigned By      Initials Name Provider Type    PP Jair Costello, OT Occupational Therapist                     Mobility/ADL's       Row Name 03/03/25 1451          Bed Mobility    Supine-Sit [place] (Bed Mobility) moderate assist (50% patient effort);verbal cues;nonverbal cues (demo/gesture)  -PP     Sit-Supine [place] (Bed Mobility) moderate assist (50% patient effort);verbal cues;nonverbal cues (demo/gesture)  -PP     Assistive Device (Bed Mobility) bed rails;head of bed elevated  -PP     Comment, (Bed Mobility) Pt reqs increased time and uses BUE to pull self on bed rails with cueing  -PP       Row Name 03/03/25 1451          Sit-Stand Transfer    Sit-Stand [place] (Transfers) verbal cues;nonverbal cues (demo/gesture);maximum assist (25% patient effort);1 person assist  -PP     Assistive Device (Sit-Stand Transfers) walker, front-wheeled  -PP     Comment, (Sit-Stand Transfer) cues for tech and hand/foot placement  -PP       Row Name 03/03/25 1451          Functional Mobility    Functional Mobility- Ind. Level minimum assist (75% patient  effort);1 person;moderate assist (50% patient effort)  -PP     Functional Mobility- Device walker, front-wheeled  -PP     Functional Mobility- Comment Pt able to take several side steps toward HOB with Min-Mod A x1/ rwx  -PP       Row Name 03/03/25 1451          Activities of Daily Living    BADL Assessment/Intervention lower body dressing;grooming;feeding;toileting  -PP       Row Name 03/03/25 1451          Lower Body Dressing Assessment/Training    [place] Level (Lower Body Dressing) lower body dressing skills;maximum assist (25% patient effort)  -PP       Row Name 03/03/25 1451          Grooming Assessment/Training    [place] Level (Grooming) grooming skills;set up  -PP       Row Name 03/03/25 1451          Self-Feeding Assessment/Training    [place] Level (Feeding) feeding skills;set up  -PP       Row Name 03/03/25 145          Toileting Assessment/Training    [place] Level (Toileting) toileting skills;standby assist  -PP     Assistive Devices (Toileting) urinal  -PP     Position (Toileting) edge of bed sitting  -PP     Comment, (Toileting) Pt reqs Sba to use urianl sitting EOB  -PP               User Key  (r) = Recorded By, (t) = Taken By, (c) = Cosigned By      Initials Name Provider Type    PP Jair Costello, OT Occupational Therapist                   Obj/Interventions       Row Name 03/03/25 1454          Sensory Assessment (Somatosensory)    Sensory Assessment (Somatosensory) UE sensation intact  -PP     Sensory Assessment per pt report  -PP       Row Name 03/03/25 1454          Vision Assessment/Intervention    Visual Impairment/Limitations WFL  -PP       Row Name 03/03/25 1454          Range of Motion Comprehensive    General Range of Motion bilateral upper extremity ROM WFL  -PP       Row Name 03/03/25 1454          Strength Comprehensive (MMT)    General Manual Muscle Testing (MMT) Assessment upper extremity strength deficits identified  -PP     Comment, General Manual Muscle  Testing (MMT) Assessment BUE gen weakness, grossly 3+/5  -PP       Row Name 03/03/25 1454          Motor Skills    Motor Skills functional endurance  -PP     Functional Endurance fair  -PP       Row Name 03/03/25 1454          Balance    Balance Assessment sitting static balance;standing static balance;sitting dynamic balance;standing dynamic balance  -PP     Static Sitting Balance standby assist  -PP     Dynamic Sitting Balance contact guard  -PP     Position, Sitting Balance sitting edge of bed  -PP     Static Standing Balance contact guard;minimal assist  -PP     Dynamic Standing Balance minimal assist;moderate assist  -PP     Position/Device Used, Standing Balance supported;walker, front-wheeled  -PP     Balance Interventions sitting;standing;static;dynamic;occupation based/functional task  -PP     Comment, Balance Pt reqs constant cueing and Sba-Cga for sitting bal, pt noted to lean over freq d/t pain from sore on bottom.  -PP               User Key  (r) = Recorded By, (t) = Taken By, (c) = Cosigned By      Initials Name Provider Type    PP Jair Costello, OT Occupational Therapist                   Goals/Plan       Row Name 03/03/25 1445          Bed Mobility Goal 1 (OT)    Activity/Assistive Device (Bed Mobility Goal 1, OT) bed mobility activities, all  -PP     [place] Level/Cues Needed (Bed Mobility Goal 1, OT) contact guard required  -PP     Time Frame (Bed Mobility Goal 1, OT) 2 weeks  -PP     Progress/Outcomes (Bed Mobility Goal 1, OT) goal ongoing  -PP       Row Name 03/03/25 1445          Transfer Goal 1 (OT)    Activity/Assistive Device (Transfer Goal 1, OT) transfers, all;sit-to-stand/stand-to-sit;toilet  -PP     [place] Level/Cues Needed (Transfer Goal 1, OT) contact guard required  -PP     Time Frame (Transfer Goal 1, OT) short term goal (STG);2 weeks  -PP     Progress/Outcome (Transfer Goal 1, OT) goal ongoing  -PP       Row Name 03/03/25 1445          Dressing Goal 1 (OT)     Activity/Device (Dressing Goal 1, OT) dressing skills, all  -PP     [place]/Cues Needed (Dressing Goal 1, OT) contact guard required  -PP     Time Frame (Dressing Goal 1, OT) short term goal (STG);2 weeks  -PP     Progress/Outcome (Dressing Goal 1, OT) goal ongoing  -PP       Row Name 03/03/25 1440          Toileting Goal 1 (OT)    Activity/Device (Toileting Goal 1, OT) commode;perform perineal hygiene;adjust/manage clothing;grab bar/safety frame  -PP     [place] Level/Cues Needed (Toileting Goal 1, OT) contact guard required  -PP     Time Frame (Toileting Goal 1, OT) short term goal (STG);2 weeks  -PP     Progress/Outcome (Toileting Goal 1, OT) goal ongoing  -PP       Row Name 03/03/25 0654          Problem Specific Goal 1 (OT)    Problem Specific Goal 1 (OT) Pt will improve BUE strength and ROM to increase (I) w/ UE ADLs and xfers.  -PP     Time Frame (Problem Specific Goal 1, OT) 2 weeks  -PP     Progress/Outcome (Problem Specific Goal 1, OT) goal ongoing  -PP       Row Name 03/03/25 8225          Therapy Assessment/Plan (OT)    Planned Therapy Interventions (OT) activity tolerance training;BADL retraining;functional balance retraining;occupation/activity based interventions;patient/caregiver education/training;strengthening exercise;transfer/mobility retraining;ROM/therapeutic exercise  -PP               User Key  (r) = Recorded By, (t) = Taken By, (c) = Cosigned By      Initials Name Provider Type    PP Jair Costello, RAUL Occupational Therapist                   Clinical Impression       Row Name 03/03/25 1458          Pain Assessment    Pre/Posttreatment Pain Comment Pt c/o pain for sore on bottom but does not rate.  -PP       Row Name 03/03/25 1450          Plan of Care Review    Plan of Care Reviewed With patient  -PP     Progress improving  -PP     Outcome Evaluation Pt is 83 y/o M admitted to [facility] on 2/26/25 with SOB, cough, fever - found to have PNA. PMHx includes: COPD, Afib, HTN. At  baseline pt is a LTC resident at [facility]. Pt reporting he normally gets up to the bathroom and to a chair with assist but level of assist unclear. Pt not currently recieiving therapy. Pt presents to OT requiring mod A for bed mob, mod-max Ax1/ rwx to stand EOB and min Ax1/ rwx to take a couple side steps. Sba-Cga for sitting bal while using urinal with Sba. Pt would benefit from skilled OT to maximize patient safety and promote (I) w/ ADLs/ xfers and address deficits act tolerance/ endurance, balance, and strength. Rec return to snf at d/c. OT will continue to monitor.  -PP       Row Name 03/03/25 1453          Therapy Assessment/Plan (OT)    Rehab Potential (OT) good  -PP     Criteria for Skilled Therapeutic Interventions Met (OT) yes;skilled treatment is necessary  -PP     Therapy Frequency (OT) 3 times/wk  -PP       Row Name 03/03/25 1450          Therapy Plan Review/Discharge Plan (OT)    Anticipated Discharge Disposition (OT) skilled nursing facility  -PP       Row Name 03/03/25 1453          Vital Signs    O2 Delivery Pre Treatment room air  -PP     Pre Patient Position Supine  -PP     Intra Patient Position Standing  -PP     Post Patient Position Supine  -PP       Row Name 03/03/25 1456          Positioning and Restraints    Pre-Treatment Position in bed  -PP     Post Treatment Position bed  -PP     In Bed notified nsg;fowlers;call light within reach;encouraged to call for assist;exit alarm on  -PP               User Key  (r) = Recorded By, (t) = Taken By, (c) = Cosigned By      Initials Name Provider Type    PP Jair Costello, OT Occupational Therapist                   Outcome Measures       Row Name 03/03/25 5196          How much help from another is currently needed...    Putting on and taking off regular lower body clothing? 2  -PP     Bathing (including washing, rinsing, and drying) 2  -PP     Toileting (which includes using toilet bed pan or urinal) 3  -PP     Putting on and taking off  regular upper body clothing 3  -PP     Taking care of personal grooming (such as brushing teeth) 3  -PP     Eating meals 4  -PP     AM-PAC 6 Clicks Score (OT) 17  -PP       Row Name 03/03/25 1446          Functional Assessment    Outcome Measure Options AM-PAC 6 Clicks Daily Activity (OT)  -PP               User Key  (r) = Recorded By, (t) = Taken By, (c) = Cosigned By      Initials Name Provider Type    PP Jair Costello OT Occupational Therapist                    Occupational Therapy Education       Title: PT OT SLP Therapies (In Progress)       Topic: Occupational Therapy (In Progress)       Point: ADL training (Done)       Description:   Instruct learner(s) on proper safety adaptation and remediation techniques during self care or transfers.   Instruct in proper use of assistive devices.                  Learning Progress Summary            Patient Acceptance, E, VU by PP at 3/3/2025 1447    Comment: Pt Ed on OT role and dc planning.                      Point: Home exercise program (Not Started)       Description:   Instruct learner(s) on appropriate technique for monitoring, assisting and/or progressing therapeutic exercises/activities.                  Learner Progress:  Not documented in this visit.              Point: Precautions (Not Started)       Description:   Instruct learner(s) on prescribed precautions during self-care and functional transfers.                  Learner Progress:  Not documented in this visit.              Point: Body mechanics (Not Started)       Description:   Instruct learner(s) on proper positioning and spine alignment during self-care, functional mobility activities and/or exercises.                  Learner Progress:  Not documented in this visit.                              User Key       Initials Effective Dates Name Provider Type Discipline    PP 06/09/23 -  Jair Costello OT Occupational Therapist OT                  OT Recommendation and Plan  Planned Therapy  Interventions (OT): activity tolerance training, BADL retraining, functional balance retraining, occupation/activity based interventions, patient/caregiver education/training, strengthening exercise, transfer/mobility retraining, ROM/therapeutic exercise  Therapy Frequency (OT): 3 times/wk  Plan of Care Review  Plan of Care Reviewed With: patient  Progress: improving  Outcome Evaluation: Pt is 83 y/o M admitted to [facility] on 2/26/25 with SOB, cough, fever - found to have PNA. PMHx includes: COPD, Afib, HTN. At baseline pt is a LTC resident at [facility]. Pt reporting he normally gets up to the bathroom and to a chair with assist but level of assist unclear. Pt not currently recieiving therapy. Pt presents to OT requiring mod A for bed mob, mod-max Ax1/ rwx to stand EOB and min Ax1/ rwx to take a couple side steps. Sba-Cga for sitting bal while using urinal with Sba. Pt would benefit from skilled OT to maximize patient safety and promote (I) w/ ADLs/ xfers and address deficits act tolerance/ endurance, balance, and strength. Rec return to snf at d/c. OT will continue to monitor.     Time Calculation:   Evaluation Complexity (OT)  Review Occupational Profile/Medical/Therapy History Complexity: expanded/moderate complexity  Assessment, Occupational Performance/Identification of Deficit Complexity: 3-5 performance deficits  Clinical Decision Making Complexity (OT): detailed assessment/moderate complexity  Overall Complexity of Evaluation (OT): moderate complexity     Time Calculation- OT       Row Name 03/03/25 1444             Time Calculation- OT    OT Start Time 1050  -PP      OT Stop Time 1113  -PP      OT Time Calculation (min) 23 min  -PP      Total Timed Code Minutes- OT 13 minute(s)  -PP      OT Received On 03/03/25  -PP      OT - Next Appointment 03/05/25  -PP      OT Goal Re-Cert Due Date 03/17/25  -PP         Timed Charges    66142 - OT Therapeutic Activity Minutes 13  -PP         Untimed Charges    OT  Eval/Re-eval Minutes 10  -PP         Total Minutes    Timed Charges Total Minutes 13  -PP      Untimed Charges Total Minutes 10  -PP       Total Minutes 23  -PP                User Key  (r) = Recorded By, (t) = Taken By, (c) = Cosigned By      Initials Name Provider Type    PP PravinJair yarbrough, OT Occupational Therapist                  Therapy Charges for Today       Code Description Service Date Service Provider Modifiers Qty    [id number]  OT THERAPEUTIC ACT EA 15 MIN 3/3/2025 Jair Costello, OT GO 1    [id number]  OT EVAL MOD COMPLEXITY 3 3/3/2025 Alina Costelloia, OT GO 1                 Portavon Costello, OT  3/3/2025

## 2025-03-03 NOTE — PROGRESS NOTES
"Nutrition Services    Patient Name:  Tony Leonard  YOB: 1940  MRN: [id number]  Admit Date:  2/26/2025    Assessment Date:  03/03/25    NUTRITION SCREENING      Reason for Encounter MST score 2+   Diagnosis/Problem 83 yo male adm with PNA, HTN, CHF       PO Diet Diet: Cardiac; Healthy Heart (2-3 Na+); Fluid Consistency: Thin (IDDSI 0)   Supplements n/a   PO Intake % Good per pt, preference taken, will liberalize diets       Medications MAR reviewed by RD   Labs  Listed below, reviewed   Physical Findings Alert, oriented, laying in bed   GI Function + BM 2/28   Skin Status MASD Gluteal       Height  Weight  BMI  Weight Trend     Height: 180.3 cm (71\")  Weight: 84.6 kg (186 lb 8.2 oz) (03/03/25 0500)  Body mass index is 26.01 kg/m².  Gain, Amount/Timeframe: 15# gain       Nutrition Problem (PES) Patient with decreased intake r/t diet restrictions AEB pt report       Intervention/Plan Will liberalize diet to Regular to help aid intake.    RD to follow up per protocol.     Results from last 7 days   Lab Units 03/03/25  0251 03/01/25  0356 02/28/25  1540 02/27/25  0610 02/26/25  2100   SODIUM mmol/L 138 140 135*   < > 136   POTASSIUM mmol/L 3.9 3.5 3.8   < > 3.7   CHLORIDE mmol/L 106 104 101   < > 101   CO2 mmol/L 23.5 22.9 23.5   < > 23.7   BUN mg/dL 31* 17 18   < > 19   CREATININE mg/dL 1.23 0.97 1.08   < > 1.10   CALCIUM mg/dL 8.4* 8.5* 8.2*   < > 8.5*   BILIRUBIN mg/dL  --   --   --   --  1.0   ALK PHOS U/L  --   --   --   --  65   ALT (SGPT) U/L  --   --   --   --  20   AST (SGOT) U/L  --   --   --   --  16   GLUCOSE mg/dL 118* 194* 195*   < > 109*    < > = values in this interval not displayed.     Results from last 7 days   Lab Units 03/03/25  0251   HEMOGLOBIN g/dL 11.1*   HEMATOCRIT % 34.1*     Lab Results   Component Value Date    HGBA1C 5.39 04/04/2017         Electronically signed by:  Andrew Lion RD  03/03/25 10:13 EST  "

## 2025-03-03 NOTE — PROGRESS NOTES
"      [facility]         Dr Dash Sayied   LOS: 5 days   Patient Care Team:  Alfonso Goldstein MD as PCP - General (Family Medicine)  Katharina Salter MD as Consulting Physician (Pulmonary Disease)  Anum Bhatt MD as Consulting Physician (Pain Medicine)    Chief Complaint: Pneumonia with COPD GERD chronic diastolic heart failure    Interval History: Remains weak tired and reports shortness of breath with minimal activity.    REVIEW OF SYSTEMS:   CARDIOVASCULAR: No chest pain, chest pressure or chest discomfort. No palpitations or edema.   RESPIRATORY: Short of breath with activity  GASTROINTESTINAL: No anorexia, nausea, vomiting or diarrhea. No abdominal pain or blood.   HEMATOLOGIC: No bleeding or bruising.     Ventilator/Non-Invasive Ventilation Settings (From admission, onward)      None              Vital Signs  Temp:  [97.1 °F (36.2 °C)-97.5 °F (36.4 °C)] 97.3 °F (36.3 °C)  Heart Rate:  [] 102  Resp:  [16-18] 18  BP: (126-144)/(61-85) 144/75    Intake/Output Summary (Last 24 hours) at 3/3/2025 1042  Last data filed at 3/3/2025 1018  Gross per 24 hour   Intake 320 ml   Output 665 ml   Net -345 ml     Flowsheet Rows      Flowsheet Row First Filed Value   Admission Height 180.3 cm (71\") Documented at 02/27/2025 0056   Admission Weight 73.5 kg (162 lb) Documented at 02/27/2025 0056          Physical Exam:  Patient is examined using the personal protective equipment as per guidelines from infection control for this particular patient as enacted.  Hand hygiene was performed before and after patient interaction.   General Appearance:    Alert, cooperative, in no acute distress.  Following simple commands  ENT Mallampati between 3 and 4 no nasal congestion  Neck midline trachea, no thyromegaly   Lungs:   Diminished breath sounds bilaterally in the bases    Heart:    Regular rhythm and normal rate, normal S1 and S2, no            murmur, no gallop, no rub, no click   Chest Wall:    No " abnormalities observed   Abdomen:     Normal bowel sounds, no masses, no organomegaly, soft        nontender, nondistended, no guarding, no rebound                tenderness   Extremities:   Moves all extremities well, no edema, no cyanosis, no             redness  CNS no focal neurological deficits normal sensory exam  Skin no rashes no nodules  Musculoskeletal no cyanosis no clubbing normal range of motion     Results Review:        Results from last 7 days   Lab Units 03/03/25  0251 03/01/25  0356 02/28/25  1540   SODIUM mmol/L 138 140 135*   POTASSIUM mmol/L 3.9 3.5 3.8   CHLORIDE mmol/L 106 104 101   CO2 mmol/L 23.5 22.9 23.5   BUN mg/dL 31* 17 18   CREATININE mg/dL 1.23 0.97 1.08   GLUCOSE mg/dL 118* 194* 195*   CALCIUM mg/dL 8.4* 8.5* 8.2*     Results from last 7 days   Lab Units 02/26/25  2100 02/26/25 2006   HSTROP T ng/L 32* 34*     Results from last 7 days   Lab Units 03/03/25  0251 03/01/25  0356 02/28/25  1540   WBC 10*3/mm3 12.16* 9.70 10.95*   HEMOGLOBIN g/dL 11.1* 10.8* 10.5*   HEMATOCRIT % 34.1* 31.9* 30.7*   PLATELETS 10*3/mm3 379 298 303                           I reviewed the patient's new clinical results.  I personally viewed and interpreted the patient's chest x-ray.        Medication Review:   acetylcysteine, 2 mL, Nebulization, BID - RT  amoxicillin-clavulanate, 1 tablet, Oral, TID  [Held by provider] arformoterol, 15 mcg, Nebulization, BID - RT   And  [Held by provider] budesonide, 0.5 mg, Nebulization, BID - RT  azithromycin, 250 mg, Oral, Q24H  enoxaparin sodium, 40 mg, Subcutaneous, Q24H  FLUoxetine, 20 mg, Oral, Daily  furosemide, 20 mg, Oral, BID  gabapentin, 300 mg, Oral, TID  guaiFENesin, 1,200 mg, Oral, BID  ipratropium-albuterol, 3 mL, Nebulization, Q4H While Awake - RT  pantoprazole, 40 mg, Oral, Daily  predniSONE, 20 mg, Oral, BID With Meals  sodium chloride, 4 mL, Nebulization, BID - RT  sucralfate, 1 g, Oral, 4x Daily             ASSESSMENT:   Bilateral  pneumonia  COPD  GERD  Hypertension  Chronic diastolic heart failure    PLAN:  Pulmonary status remains stable but he remains incredibly weak  Oral steroids can be weaned post discharge  Oxygen requirement at baseline on 2 L nasal cannula  Aggressive pulmonary toilet with bronchodilators chest PT flutter valve Mucomyst  Mobilize ambulate  Diet per speech  Oral antibiotics complete course.  Will likely need rehab at discharge      Ekta Grace MD  03/03/25  10:42 EST

## 2025-03-04 ENCOUNTER — READMISSION MANAGEMENT (OUTPATIENT)
Dept: CALL CENTER | Facility: HOSPITAL | Age: 85
End: 2025-03-04
Payer: MEDICARE

## 2025-03-04 VITALS
OXYGEN SATURATION: 97 % | BODY MASS INDEX: 26.39 KG/M2 | SYSTOLIC BLOOD PRESSURE: 147 MMHG | TEMPERATURE: 97.9 F | DIASTOLIC BLOOD PRESSURE: 68 MMHG | HEIGHT: 71 IN | WEIGHT: 188.49 LBS | HEART RATE: 95 BPM | RESPIRATION RATE: 18 BRPM

## 2025-03-04 PROBLEM — J15.5: Status: ACTIVE | Noted: 2025-03-04

## 2025-03-04 LAB
ANION GAP SERPL CALCULATED.3IONS-SCNC: 10.8 MMOL/L (ref 5–15)
BUN SERPL-MCNC: 33 MG/DL (ref 8–23)
BUN/CREAT SERPL: 26.6 (ref 7–25)
CALCIUM SPEC-SCNC: 8.6 MG/DL (ref 8.6–10.5)
CHLORIDE SERPL-SCNC: 106 MMOL/L (ref 98–107)
CO2 SERPL-SCNC: 24.2 MMOL/L (ref 22–29)
CREAT SERPL-MCNC: 1.24 MG/DL (ref 0.76–1.27)
DEPRECATED RDW RBC AUTO: 51 FL (ref 37–54)
EGFRCR SERPLBLD CKD-EPI 2021: 56.6 ML/MIN/1.73
ERYTHROCYTE [DISTWIDTH] IN BLOOD BY AUTOMATED COUNT: 15.5 % (ref 12.3–15.4)
GLUCOSE SERPL-MCNC: 118 MG/DL (ref 65–99)
HCT VFR BLD AUTO: 33.5 % (ref 37.5–51)
HGB BLD-MCNC: 11.4 G/DL (ref 13–17.7)
MCH RBC QN AUTO: 31.3 PG (ref 26.6–33)
MCHC RBC AUTO-ENTMCNC: 34 G/DL (ref 31.5–35.7)
MCV RBC AUTO: 92 FL (ref 79–97)
PLATELET # BLD AUTO: 381 10*3/MM3 (ref 140–450)
PMV BLD AUTO: 8.9 FL (ref 6–12)
POTASSIUM SERPL-SCNC: 4.4 MMOL/L (ref 3.5–5.2)
RBC # BLD AUTO: 3.64 10*6/MM3 (ref 4.14–5.8)
SODIUM SERPL-SCNC: 141 MMOL/L (ref 136–145)
WBC NRBC COR # BLD AUTO: 11.83 10*3/MM3 (ref 3.4–10.8)

## 2025-03-04 PROCEDURE — 94668 MNPJ CHEST WALL SBSQ: CPT

## 2025-03-04 PROCEDURE — 94761 N-INVAS EAR/PLS OXIMETRY MLT: CPT

## 2025-03-04 PROCEDURE — 94799 UNLISTED PULMONARY SVC/PX: CPT

## 2025-03-04 PROCEDURE — 94664 DEMO&/EVAL PT USE INHALER: CPT

## 2025-03-04 PROCEDURE — 80048 BASIC METABOLIC PNL TOTAL CA: CPT | Performed by: STUDENT IN AN ORGANIZED HEALTH CARE EDUCATION/TRAINING PROGRAM

## 2025-03-04 PROCEDURE — 63710000001 PREDNISONE PER 1 MG: Performed by: INTERNAL MEDICINE

## 2025-03-04 PROCEDURE — 94669 MECHANICAL CHEST WALL OSCILL: CPT

## 2025-03-04 PROCEDURE — 94760 N-INVAS EAR/PLS OXIMETRY 1: CPT

## 2025-03-04 PROCEDURE — 85027 COMPLETE CBC AUTOMATED: CPT | Performed by: STUDENT IN AN ORGANIZED HEALTH CARE EDUCATION/TRAINING PROGRAM

## 2025-03-04 RX ORDER — AMOXICILLIN AND CLAVULANATE POTASSIUM 500; 125 MG/1; MG/1
1 TABLET, FILM COATED ORAL 3 TIMES DAILY
Qty: 4 TABLET | Refills: 0 | Status: SHIPPED | OUTPATIENT
Start: 2025-03-04 | End: 2025-03-06

## 2025-03-04 RX ORDER — PREDNISONE 20 MG/1
TABLET ORAL
Qty: 11 TABLET | Refills: 0 | Status: SHIPPED | OUTPATIENT
Start: 2025-03-04 | End: 2025-03-13

## 2025-03-04 RX ADMIN — ACETYLCYSTEINE 2 ML: 200 SOLUTION ORAL; RESPIRATORY (INHALATION) at 11:39

## 2025-03-04 RX ADMIN — GABAPENTIN 300 MG: 300 CAPSULE ORAL at 10:55

## 2025-03-04 RX ADMIN — GUAIFENESIN 1200 MG: 600 TABLET, MULTILAYER, EXTENDED RELEASE ORAL at 08:48

## 2025-03-04 RX ADMIN — FLUOXETINE 20 MG: 20 CAPSULE ORAL at 08:48

## 2025-03-04 RX ADMIN — SUCRALFATE 1 G: 1 TABLET ORAL at 08:49

## 2025-03-04 RX ADMIN — AMOXICILLIN AND CLAVULANATE POTASSIUM 500 MG: 500; 125 TABLET, FILM COATED ORAL at 08:49

## 2025-03-04 RX ADMIN — PANTOPRAZOLE SODIUM 40 MG: 40 TABLET, DELAYED RELEASE ORAL at 08:49

## 2025-03-04 RX ADMIN — IPRATROPIUM BROMIDE AND ALBUTEROL SULFATE 3 ML: .5; 3 SOLUTION RESPIRATORY (INHALATION) at 08:31

## 2025-03-04 RX ADMIN — FUROSEMIDE 20 MG: 20 TABLET ORAL at 08:49

## 2025-03-04 RX ADMIN — PREDNISONE 20 MG: 20 TABLET ORAL at 08:48

## 2025-03-04 RX ADMIN — IPRATROPIUM BROMIDE AND ALBUTEROL SULFATE 3 ML: .5; 3 SOLUTION RESPIRATORY (INHALATION) at 11:38

## 2025-03-04 RX ADMIN — Medication 4 ML: at 08:32

## 2025-03-04 NOTE — CASE MANAGEMENT/SOCIAL WORK
"Physicians Statement of Medical Necessity for  Ambulance Transportation    GENERAL INFORMATION     Name: Tony Leonard  YOB: 1940  Medicare #: [id number]  Transport Date: 3-4-2025 (Valid for round trips this date, or for scheduled repetitive trips for 60 days from the date signed below.)  Origin: [facility]  Destination: [facility]  Is the Patient's stay covered under Medicare Part A (PPS/DRG?)Yes  Closest appropriate facility? Yes  If this a hosp-hosp transfer? No  Is this a hospice patient? No    MEDICAL NECESSITY QUESTIONAIRE    Ambulance Transportation is medically necessary only if other means of transportation are contraindicated or would be potentially harmful to the patient.  To meet this requirement, the patient must be either \"bed confined\" or suffer from a condition such that transport by means other than an ambulance is contraindicated by the patient's condition.  The following questions must be answered by the healthcare professional signing below for this form to be valid:     1) Describe the MEDICAL CONDITION (physical and/or mental) of this patient AT THE TIME OF AMBULANCE TRANSPORT that requires the patient to be transported in an ambulance, and why transport by other means is contraindicated by the patient's condition:   Past Medical History:   Diagnosis Date    ADHD (attention deficit hyperactivity disorder)     Altered mental status, unspecified     Anemia, unspecified     Attention-deficit hyperactivity disorder, unspecified type     Benign prostatic hyperplasia without lower urinary tract symptoms     Body mass index 26.0-26.9, adult     Bronchiectasis     Bronchiectasis, uncomplicated     Chest pain, unspecified     CHF (congestive heart failure)     Chronic diastolic (congestive) heart failure     Chronic respiratory failure with hypoxia     Cognitive communication deficit     Colon polyp     COPD (chronic obstructive pulmonary disease)     Depression, " unspecified     Diaphragmatic hernia without obstruction or gangrene     Diverticulosis     Diverticulosis of both small and large intestine without perforation or abscess without bleeding     Dorsalgia, unspecified     Encounter for immunization     Eosinophilic asthma     Essential (primary) hypertension     Gastroesophageal reflux disease without esophagitis     Hard of hearing     Hyperlipidemia, unspecified     Hypertensive heart disease with congestive heart failure     Hypoxemia     Influenza due to other identified influenza virus with other respiratory manifestations     Insomnia, unspecified     Localized edema     Mild protein-calorie malnutrition     Muscle weakness (generalized)     On home oxygen therapy     2 LITERS    Other specified abnormal findings of blood chemistry     Overweight     Paroxysmal atrial fibrillation     Personal history of malignant neoplasm of prostate     Pneumonia     Polyneuropathy, unspecified     Prostate cancer 04/22/2019    Restless leg syndrome     Sleep related hypoventilation in conditions classified elsewhere     Spinal stenosis, lumbar region with neurogenic claudication 08/02/2022    Spinal stenosis, lumbar region, with neurogenic claudication     Weakness       Past Surgical History:   Procedure Laterality Date    BRONCHOSCOPY N/A 04/30/2016    Procedure: BRONCHOSCOPY with BAL of right lower lobe and left  lower lobe.  ;  Surgeon: Nando Diaz MD;  Location: [facility] ENDOSCOPY;  Service:     BRONCHOSCOPY N/A 04/28/2017    Procedure: BRONCHOSCOPY;  Surgeon: Katharina Salter MD;  Location: [facility] ENDOSCOPY;  Service:     BRONCHOSCOPY Bilateral 06/29/2017    Procedure: BRONCHOSCOPY WITH WASHINGS;  Surgeon: Katharina Salter MD;  Location: [facility] ENDOSCOPY;  Service:     BRONCHOSCOPY N/A 01/22/2018    Procedure: BRONCHOSCOPY AT BEDSIDE with BAL;  Surgeon: Katharina Salter MD;  Location: [facility] ENDOSCOPY;  Service:     BRONCHOSCOPY N/A 01/30/2018    Procedure: BRONCHOSCOPY  with BAL and washing;  Surgeon: Shreyas Wild MD;  Location: [facility] ENDOSCOPY;  Service:     BRONCHOSCOPY Bilateral 04/24/2018    Procedure: BRONCHOSCOPY WITH WASHING;  Surgeon: Katharina Salter MD;  Location: [facility] ENDOSCOPY;  Service: Pulmonary    BRONCHOSCOPY N/A 12/28/2019    Procedure: BRONCHOSCOPY with bilateral washing;  Surgeon: Katharina Salter MD;  Location: [facility] ENDOSCOPY;  Service: Pulmonary    BRONCHOSCOPY Bilateral 01/04/2023    Procedure: BRONCHOSCOPY with lavage;  Surgeon: Katharina Salter MD;  Location: [facility] ENDOSCOPY;  Service: Pulmonary;  Laterality: Bilateral;  mucus plugging    CARDIAC CATHETERIZATION N/A 01/29/2023    Procedure: Left Heart Cath;  Surgeon: Johnnie Ang MD;  Location: [facility] CATH INVASIVE LOCATION;  Service: Cardiology;  Laterality: N/A;    CARDIAC CATHETERIZATION N/A 01/29/2023    Procedure: Coronary angiography;  Surgeon: Johnnie Ang MD;  Location: [facility] CATH INVASIVE LOCATION;  Service: Cardiology;  Laterality: N/A;    COLONOSCOPY  05/17/2013    eh, ih, tort, sig tics    COLONOSCOPY N/A 05/10/2019    Non-thrombosed external hemorrhoids found on perianal exam, Diverticulosis, Tortuous colon, One 5 mm polyp in the mid ascending colon, IH. Path: Tubular adenoma.     COLONOSCOPY N/A 09/24/2022    Procedure: COLONOSCOPY to cecum and TI with argon plasma coagulation.;  Surgeon: Bruce Black MD;  Location: [facility] ENDOSCOPY;  Service: Gastroenterology;  Laterality: N/A;  pre- GI bleeding  post- radiation proctitis, diverticulosis    ENDOSCOPY  03/19/2015    z line irreg, hh    ENDOSCOPY N/A 09/24/2022    Procedure: ESOPHAGOGASTRODUODENOSCOPY;  Surgeon: Bruce Black MD;  Location: [facility] ENDOSCOPY;  Service: Gastroenterology;  Laterality: N/A;  pre- GI bleeding  post- esophagitis, hiatal hernia    HIP OPEN REDUCTION Right 01/17/2018    Procedure: HIP OPEN REDUCTION INTERNAL FIXATION WITH DYNAMIC HIP SCREW;  Surgeon: Les Black MD;  Location: [facility]  "MAIN OR;  Service:     SPINE SURGERY      TONSILLECTOMY      TOTAL HIP ARTHROPLASTY REVISION Right 09/23/2019    Procedure: HIP  REVISION RIGHT;  Surgeon: Isauro Warner MD;  Location: [facility] MAIN OR;  Service: Orthopedics      2) Is this patient \"bed confined\" as defined below?No    To be \"bed confined\" the patient must satisfy all three of the following criteria:  (1) unable to get up from bed without assistance; AND (2) unable to ambulate;  AND (3) unable to sit in a chair or wheelchair.  3) Can this patient safely be transported by car or wheelchair van (I.e., may safely sit during transport, without an attendant or monitoring?)No   4. In addition to completing questions 1-3 above, please check any of the following conditions that apply*:          *Note: supporting documentation for any boxes checked must be maintained in the patient's medical records Patient is confused, Moderate/severe pain on movement, Unable to tolerate seated position for time needed to transport, and Other High falls risk, limited mobility      SIGNATURE OF PHYSICIAN OR OTHER AUTHORIZED HEALTHCARE PROFESSIONAL    I certify that the above information is true and correct based on my evaluation of this patient, and represent that the patient requires transport by ambulance and that other forms of transport are contraindicated.  I understand that this information will be used by the Centers for Medicare and Medicaid Services (CMS) to support the determiniation of medical necessity for ambulance services, and I represent that I have personal knowledge of the patient's condition at the time of transport.       If this box is checked, I also certify that the patient is physically or mentally incapable of signing the ambulance service's claim form and that the institution with which I am affiliated has furnished care, services or assistance to the patient.  My signature below is made on behalf of the patient pursuant to 42 .36(b)(4). In " accordance with 42 .37, the specific reason(s) that the patient is physically or mentally incapable of signing the claim for is as follows:     Signature of Physician or Healthcare Professional  Date/Time:        (For Scheduled repetitive transport, this form is not valid for transports performed more than 60 days after this date).                                                                                                                                            --------------------------------------------------------------------------------------------  Printed Name and Credentials of Physician or Authorized Healthcare Professional     Form must be signed by patient's attending physician for scheduled, repetitive transports,.  For non-repetitive ambulance transports, if unable to obtain the signature of the attending physician, any of the following may sign (please select below):     Physician  Clinical Nurse Specialist  Registered Nurse     Physician Assistant  Discharge Planner  Licensed Practical Nurse     Nurse Practitioner

## 2025-03-04 NOTE — PROGRESS NOTES
"      [facility]         Dr Dash Sayied   LOS: 6 days   Patient Care Team:  Alfonso Goldstein MD as PCP - General (Family Medicine)  Katharina Salter MD as Consulting Physician (Pulmonary Disease)  Anum Bhatt MD as Consulting Physician (Pain Medicine)    Chief Complaint: Pneumonia with COPD GERD chronic diastolic heart failure    Interval History: Resting comfortably no overnight issues reported.  He has been taken off the oxygen now.    REVIEW OF SYSTEMS:   CARDIOVASCULAR: No chest pain, chest pressure or chest discomfort. No palpitations or edema.   RESPIRATORY: Short of breath with activity  GASTROINTESTINAL: No anorexia, nausea, vomiting or diarrhea. No abdominal pain or blood.   HEMATOLOGIC: No bleeding or bruising.     Ventilator/Non-Invasive Ventilation Settings (From admission, onward)      None              Vital Signs  Temp:  [97.5 °F (36.4 °C)-98.1 °F (36.7 °C)] 97.9 °F (36.6 °C)  Heart Rate:  [] 95  Resp:  [16-18] 18  BP: (127-147)/(68-82) 147/68    Intake/Output Summary (Last 24 hours) at 3/4/2025 1153  Last data filed at 3/4/2025 0919  Gross per 24 hour   Intake 240 ml   Output 800 ml   Net -560 ml     Flowsheet Rows      Flowsheet Row First Filed Value   Admission Height 180.3 cm (71\") Documented at 02/27/2025 0056   Admission Weight 73.5 kg (162 lb) Documented at 02/27/2025 0056          Physical Exam:  Patient is examined using the personal protective equipment as per guidelines from infection control for this particular patient as enacted.  Hand hygiene was performed before and after patient interaction.   General Appearance:    Alert, cooperative, in no acute distress.  Following simple commands  ENT Mallampati between 3 and 4 no nasal congestion  Neck midline trachea, no thyromegaly   Lungs:   Diminished breath sounds bilaterally in the bases    Heart:    Regular rhythm and normal rate, normal S1 and S2, no            murmur, no gallop, no rub, no click   Chest " Wall:    No abnormalities observed   Abdomen:     Normal bowel sounds, no masses, no organomegaly, soft        nontender, nondistended, no guarding, no rebound                tenderness   Extremities:   Moves all extremities well, no edema, no cyanosis, no             redness  CNS no focal neurological deficits normal sensory exam  Skin no rashes no nodules  Musculoskeletal no cyanosis no clubbing normal range of motion     Results Review:        Results from last 7 days   Lab Units 03/04/25 0427 03/03/25 0251 03/01/25  0356   SODIUM mmol/L 141 138 140   POTASSIUM mmol/L 4.4 3.9 3.5   CHLORIDE mmol/L 106 106 104   CO2 mmol/L 24.2 23.5 22.9   BUN mg/dL 33* 31* 17   CREATININE mg/dL 1.24 1.23 0.97   GLUCOSE mg/dL 118* 118* 194*   CALCIUM mg/dL 8.6 8.4* 8.5*     Results from last 7 days   Lab Units 02/26/25  2100 02/26/25 2006   HSTROP T ng/L 32* 34*     Results from last 7 days   Lab Units 03/04/25 0427 03/03/25 0251 03/01/25  0356   WBC 10*3/mm3 11.83* 12.16* 9.70   HEMOGLOBIN g/dL 11.4* 11.1* 10.8*   HEMATOCRIT % 33.5* 34.1* 31.9*   PLATELETS 10*3/mm3 381 379 298                           I reviewed the patient's new clinical results.  I personally viewed and interpreted the patient's chest x-ray.        Medication Review:   acetylcysteine, 2 mL, Nebulization, BID - RT  amoxicillin-clavulanate, 1 tablet, Oral, TID  [Held by provider] arformoterol, 15 mcg, Nebulization, BID - RT   And  [Held by provider] budesonide, 0.5 mg, Nebulization, BID - RT  enoxaparin sodium, 40 mg, Subcutaneous, Q24H  FLUoxetine, 20 mg, Oral, Daily  furosemide, 20 mg, Oral, BID  gabapentin, 300 mg, Oral, TID  guaiFENesin, 1,200 mg, Oral, BID  ipratropium-albuterol, 3 mL, Nebulization, Q4H While Awake - RT  pantoprazole, 40 mg, Oral, Daily  predniSONE, 20 mg, Oral, BID With Meals  sodium chloride, 4 mL, Nebulization, BID - RT  sucralfate, 1 g, Oral, 4x Daily             ASSESSMENT:   Bilateral pneumonia  COPD  GERD  Hypertension  Chronic  diastolic heart failure    PLAN:  Pulmonary status remains stable but he remains incredibly weak  Oral steroids can be weaned post discharge  Oxygen requirement at baseline on 2 L nasal cannula  Aggressive pulmonary toilet with bronchodilators chest PT flutter valve Mucomyst  Mobilize ambulate  Diet per speech  Oral antibiotics complete course.  No objections to discharge      Ekta Grace MD  03/04/25  11:53 EST

## 2025-03-04 NOTE — DISCHARGE SUMMARY
Patient Name: Tony Leonard  : 1940  MRN: [id number]    Date of Admission: 2025  Date of Discharge:  3/4/2025  Primary Care Physician: Alfonso Goldstein MD      Chief Complaint:   Shortness of Breath      Discharge Diagnoses     Active Hospital Problems    Diagnosis  POA    **Pneumonia [J18.9]  Yes    HTN (hypertension) [I10]  Yes    Chronic diastolic CHF (congestive heart failure) [I50.32]  Yes    COPD (chronic obstructive pulmonary disease) [J44.9]  Yes    GERD (gastroesophageal reflux disease) [K21.9]  Yes      Resolved Hospital Problems   No resolved problems to display.        Hospital Course     This is an 84-year-old male with a past medical history of COPD, chronic diastolic heart failure, paroxysmal atrial fibrillation, hypertension.  He presented to hospital with acute onset shortness of breath, cough, fever.  Chest x-ray showed a patchy left lung base infiltrate consistent with pneumonia.  White blood cell count was elevated.  Respiratory viral panel was negative.  He was treated for a COPD exacerbation as well as bacterial pneumonia.  He was initially on Zosyn and transition to Augmentin, and steroids were weaned.  He is going to discharge to a skilled nursing facility.  He will need aggressive pulmonary toileting after discharge, including a flutter valve and chest physiotherapy regularly.        Physical Exam:  Temp:  [97.7 °F (36.5 °C)-98.1 °F (36.7 °C)] 97.9 °F (36.6 °C)  Heart Rate:  [] 95  Resp:  [18] 18  BP: (142-147)/(68-82) 147/68  Body mass index is 26.29 kg/m².  Physical Exam  Constitutional:       General: He is not in acute distress.  HENT:      Head: Normocephalic and atraumatic.   Cardiovascular:      Rate and Rhythm: Normal rate and regular rhythm.   Pulmonary:      Effort: Pulmonary effort is normal. No respiratory distress.   Abdominal:      General: There is no distension.      Palpations: Abdomen is soft.      Tenderness: There is no abdominal  tenderness.   Neurological:      Mental Status: He is alert and oriented to person, place, and time.         Consultants     Consult Orders (all) (From admission, onward)       Start     Ordered    02/27/25 1851  Inpatient Case Management  Consult  Once        Provider:  (Not yet assigned)    02/27/25 1851    02/27/25 1549  Inpatient Pulmonology Consult  Once        Specialty:  Pulmonary Disease  Provider:  Katharina Salter MD    02/27/25 1549    02/26/25 2308  Inpatient Palliative Care Team Consult  Once,   Status:  Canceled        Provider:  (Not yet assigned)    02/26/25 2307    02/26/25 2237  [facility] (on-call MD unless specified) Details  Once        Specialty:  Hospitalist  Provider:  (Not yet assigned)    02/26/25 2236                  Procedures     Imaging Results (All)       Procedure Component Value Units Date/Time    CT Angiogram Chest [[id number]] Collected: 02/28/25 1542     Updated: 02/28/25 1613    Narrative:      CT ANGIOGRAM OF THE CHEST. MULTIPLE CORONAL, SAGITTAL, AND 3-D  RECONSTRUCTIONS.     HISTORY: Acute hypoxic respiratory failure     TECHNIQUE: Radiation dose reduction techniques were utilized, including  automated exposure control and exposure modulation based on body size.   CT angiogram of the chest was performed. Multiple coronal, sagittal, and  3-D reconstruction images were obtained.      COMPARISON: CT chest 2/17/2025     FINDINGS:      Please note this examination is nondiagnostic for evaluation for  pulmonary arterial embolism given contrast timing with contrast most  dense within the left atrium with Hounsfield units of 228 versus  contrast within the left main pulmonary artery of 140. Pulmonary  arterial embolism cannot be excluded and if there is continued clinical  concern for pulmonary arterial embolism, recommend repeat CTA chest to  complete evaluation.     Cervical fusion hardware is only partially seen and cannot be evaluated.  No suspicious lytic or blastic  osseous lesions.     Evaluation is suboptimal due to beam hardening and streak artifact from  patient's arms being along their side. Subcentimeter hepatic lesions are  too small to characterize. There is a small to moderate hiatal hernia.     No hilar or axillary adenopathy by size criteria. Subcarinal adenopathy  measures 1.4 cm (previously 0.9 cm on 2/17/2025. Borderline enlarged  node posterior to the left 1.1 cm short axis mention (previously 0.5 cm.  Trace pericardial effusion or thickening. No pneumothorax. Endobronchial  filling defects are present in the bilateral lung bases. There is new  moderate to extensive pulmonary consolidation and opacification  bilaterally, most notably within the mid to lower lungs, with associated  tree-in-bud nodularity. Findings most suggestive of aspiration pneumonia  and correlation with patient history is recommended to confirm  appropriate etiology. Given the masslike appearance and endobronchial  filling defects, follow-up with chest CT in 8 weeks recommended to  ensure resolution exclude the possibility of malignancy. Above-mentioned  mediastinal adenopathy which is new since two 1725 can be be followed up  at this time as well.     Compression deformity of the T1, T12 and L1 vertebral bodies vertebral  body, as before.     This report was finalized on 2/28/2025 4:10 PM by Dr. Salvador Bates M.D  on Workstation: [id number]       XR Chest 1 View [[id number]] Collected: 02/26/25 1943     Updated: 02/26/25 1947    Narrative:      AP CHEST     HISTORY: Shortness of breath, cough and fever     COMPARISON: 2/16/2025     FINDINGS: There is patchy airspace infiltrate in the left lung base most  consistent with pneumonia. There may be a small left pleural effusion.  Heart size normal. Postsurgical changes of the cervical spine       Impression:      Patchy left lung base infiltrate most consistent with pneumonia. There  may be a small left pleural effusion. Follow-up to clearing  "is  recommended           This report was finalized on 2/26/2025 7:44 PM by Dr. Ricardo Wu M.D on Workstation: [id number]               Pertinent Labs     Results from last 7 days   Lab Units 03/04/25 0427 03/03/25 0251 03/01/25 0356 02/28/25  1540   WBC 10*3/mm3 11.83* 12.16* 9.70 10.95*   HEMOGLOBIN g/dL 11.4* 11.1* 10.8* 10.5*   PLATELETS 10*3/mm3 381 379 298 303     Results from last 7 days   Lab Units 03/04/25 0427 03/03/25 0251 03/01/25 0356 02/28/25  1540   SODIUM mmol/L 141 138 140 135*   POTASSIUM mmol/L 4.4 3.9 3.5 3.8   CHLORIDE mmol/L 106 106 104 101   CO2 mmol/L 24.2 23.5 22.9 23.5   BUN mg/dL 33* 31* 17 18   CREATININE mg/dL 1.24 1.23 0.97 1.08   GLUCOSE mg/dL 118* 118* 194* 195*   Estimated Creatinine Clearance: 51.7 mL/min (by C-G formula based on SCr of 1.24 mg/dL).  Results from last 7 days   Lab Units 02/26/25  2100   ALBUMIN g/dL 2.3*   BILIRUBIN mg/dL 1.0   ALK PHOS U/L 65   AST (SGOT) U/L 16   ALT (SGPT) U/L 20     Results from last 7 days   Lab Units 03/04/25 0427 03/03/25 0251 03/01/25 0356 02/28/25  1540 02/27/25  0610 02/26/25  2100   CALCIUM mg/dL 8.6 8.4* 8.5* 8.2*   < > 8.5*   ALBUMIN g/dL  --   --   --   --   --  2.3*    < > = values in this interval not displayed.       Results from last 7 days   Lab Units 02/26/25 2100 02/26/25 2006   HSTROP T ng/L 32* 34*   PROBNP pg/mL  --  200.0           Invalid input(s): \"LDLCALC\"  Results from last 7 days   Lab Units 02/26/25 2100 02/26/25 2050   BLOODCX  No growth at 5 days No growth at 5 days       Test Results Pending at Discharge       Discharge Details        Discharge Medications        New Medications        Instructions Start Date   amoxicillin-clavulanate 500-125 MG per tablet  Commonly known as: AUGMENTIN   500 mg, Oral, 3 Times Daily      predniSONE 20 MG tablet  Commonly known as: DELTASONE   Take 1 tablet by mouth 2 (Two) Times a Day With Meals for 3 days, THEN 1 tablet Daily for 3 days, THEN 0.5 tablets Daily " for 3 days.   Start Date: March 4, 2025            Continue These Medications        Instructions Start Date   acetaminophen 325 MG tablet  Commonly known as: TYLENOL   650 mg, Oral, Every 4 Hours PRN      albuterol (2.5 MG/3ML) 0.083% nebulizer solution  Commonly known as: PROVENTIL   2.5 mg, Nebulization, Every 6 Hours PRN      Aplisol 5 UNIT/0.1ML injection  Generic drug: tuberculin   5 Units, Once      azithromycin 250 MG tablet  Commonly known as: ZITHROMAX   Indications: Worsening of Chronic Obstructive Lung Disease      dicyclomine 10 MG capsule  Commonly known as: BENTYL   10 mg, Oral, 3 Times Daily PRN      dimethicone 1.3 % lotion lotion  Commonly known as: AVEENO   1 Application, Every 12 Hours PRN      ferrous sulfate 325 (65 FE) MG tablet   325 mg, Daily With Breakfast      FLUoxetine 20 MG capsule  Commonly known as: PROzac   TAKE 1 CAPSULE DAILY      furosemide 20 MG tablet  Commonly known as: LASIX   20 mg, 2 Times Daily      gabapentin 300 MG capsule  Commonly known as: NEURONTIN   300 mg, Oral, 3 Times Daily      guaiFENesin 600 MG 12 hr tablet  Commonly known as: MUCINEX   1,200 mg, 2 Times Daily      Melatonin 3 MG capsule   3 mg, As Needed      menthol-zinc oxide 0.44-20.625 % ointment ointment  Commonly known as: CALMOSEPTINE   1 Application, Every 12 Hours Scheduled      Multivitamin Adult tablet tablet  Generic drug: multivitamin with minerals   1 tablet, Oral, Daily      ondansetron ODT 4 MG disintegrating tablet  Commonly known as: ZOFRAN-ODT   4 mg, Translingual, Every 6 Hours PRN      pantoprazole 40 MG EC tablet  Commonly known as: PROTONIX   40 mg, Oral, Daily      potassium chloride 20 MEQ packet  Commonly known as: KLOR-CON   20 mEq, Daily      sodium chloride 7 % nebulizer solution nebulizer solution   4 mL, Nebulization, Every 4 Hours PRN      sucralfate 1 g tablet  Commonly known as: Carafate   1 g, Oral, 4 Times Daily      [employer] Back Pain Therapy misc   1 Pad, Daily       Sandro Ellipta 100-62.5-25 MCG/ACT inhaler  Generic drug: Fluticasone-Umeclidin-Vilant   1 puff, Inhalation, Daily - RT             Stop These Medications      calcium carbonate 500 MG chewable tablet  Commonly known as: TUMS     tiZANidine 2 MG tablet  Commonly known as: ZANAFLEX              Allergies   Allergen Reactions    Daliresp [Roflumilast] Anaphylaxis    Latex Rash    Sulfa Antibiotics Rash         Discharge Disposition:  Home or Self Care    Discharge Diet:  No active diet order      Discharge Activity:   Activity Instructions    Activity as tolerated           CODE STATUS:    Code Status and Medical Interventions: CPR (Attempt to Resuscitate); Full   Ordered at: 02/27/25 0020     Code Status (Patient has no pulse and is not breathing):    CPR (Attempt to Resuscitate)     Medical Interventions (Patient has pulse or is breathing):    Full       No future appointments.   Contact information for follow-up providers       Alfonso Goldstein MD. Schedule an appointment as soon as possible for a visit.    Specialty: Family Medicine  Contact information:  [address] [address]  [phone]                       Contact information for after-discharge care       Destination       [facility] .    Service: Nursing Home  Contact information:  [address] [facility] [address] [phone]                                   Time Spent on Discharge:  Greater than 30 minutes      Alberto Tomlinson MD  [facility]  03/04/25  11:18 EST               cane

## 2025-03-04 NOTE — OUTREACH NOTE
Prep Survey      Flowsheet Row Responses   [facility] facility patient discharged from? [place]   Is LACE score < 7 ? No   Eligibility Not Eligible   What are the reasons patient is not eligible? Other  [DC to [facility]]   Does the patient have one of the following disease processes/diagnoses(primary or secondary)? Other   Prep survey completed? Yes            Kand[facility] - Registered Nurse

## 2025-03-04 NOTE — PROGRESS NOTES
Enter Query Response Below      Query Response: bacterial pneumonia              If applicable, please update the problem list.

## 2025-03-04 NOTE — CASE MANAGEMENT/SOCIAL WORK
Case Management Discharge Note      Final Note: DC to [facility] via [facility]         Selected Continued Care - Discharged on 3/4/2025 Admission date: 2/26/2025 - Discharge disposition: Home or Self Care      Destination Coordination complete.      Service Provider Services Address Phone Fax Patient Preferred    [facility] [address] [id number] [phone] --              Durable Medical Equipment    No services have been selected for the patient.                Dialysis/Infusion    No services have been selected for the patient.                Home Medical Care    No services have been selected for the patient.                Therapy    No services have been selected for the patient.                Community Resources    No services have been selected for the patient.                Community & DME    No services have been selected for the patient.                    Selected Continued Care - Prior Encounters Includes continued care and service providers with selected services from prior encounters from 11/28/2024 to 3/4/2025      Discharged on 2/19/2025 Admission date: 2/16/2025 - Discharge disposition: Intermediate Care       Destination       Service Provider Services Address Phone Fax Patient Preferred    [address] [address] [phone] --                          Transportation Services  Ambulance: [facility]    Final Discharge Disposition Code: 04 - intermediate care facility

## 2025-03-04 NOTE — CASE MANAGEMENT/SOCIAL WORK
Continued Stay Note  [facility]     Patient Name: Tony Leonard  MRN: [id number]  Today's Date: 3/4/2025    Admit Date: 2/26/2025    Plan: Return to [facility] via [facility]   Discharge Plan       Row Name 03/04/25 1006       Plan    Plan Return to [facility] via [facility]    Plan Comments Pt to return to [facility] via [facility] at 1200. Informed pt and son, Stef. Contacted [facility] and informed of disposition. Packet to nurse.                   Discharge Codes    No documentation.                 Expected Discharge Date and Time       Expected Discharge Date Expected Discharge Time    Mar 4, 2025               Sarita Stratton RN

## 2025-03-18 ENCOUNTER — APPOINTMENT (OUTPATIENT)
Dept: GENERAL RADIOLOGY | Facility: HOSPITAL | Age: 85
DRG: 206 | End: 2025-03-18
Payer: MEDICARE

## 2025-03-18 ENCOUNTER — HOSPITAL ENCOUNTER (INPATIENT)
Facility: HOSPITAL | Age: 85
LOS: 1 days | Discharge: NURSING, REHAB OR LONG-TERM CARE | DRG: 206 | End: 2025-03-21
Attending: STUDENT IN AN ORGANIZED HEALTH CARE EDUCATION/TRAINING PROGRAM | Admitting: STUDENT IN AN ORGANIZED HEALTH CARE EDUCATION/TRAINING PROGRAM
Payer: MEDICARE

## 2025-03-18 DIAGNOSIS — J18.9 PNEUMONIA OF LEFT LOWER LOBE DUE TO INFECTIOUS ORGANISM: Primary | ICD-10-CM

## 2025-03-18 DIAGNOSIS — M54.40 CHRONIC LOW BACK PAIN WITH SCIATICA, SCIATICA LATERALITY UNSPECIFIED, UNSPECIFIED BACK PAIN LATERALITY: ICD-10-CM

## 2025-03-18 DIAGNOSIS — Z78.9 DECREASED ACTIVITIES OF DAILY LIVING (ADL): ICD-10-CM

## 2025-03-18 DIAGNOSIS — G89.29 CHRONIC LOW BACK PAIN WITH SCIATICA, SCIATICA LATERALITY UNSPECIFIED, UNSPECIFIED BACK PAIN LATERALITY: ICD-10-CM

## 2025-03-18 LAB
ALBUMIN SERPL-MCNC: 3 G/DL (ref 3.5–5.2)
ALBUMIN/GLOB SERPL: 0.9 G/DL
ALP SERPL-CCNC: 73 U/L (ref 39–117)
ALT SERPL W P-5'-P-CCNC: 19 U/L (ref 1–41)
ANION GAP SERPL CALCULATED.3IONS-SCNC: 10.1 MMOL/L (ref 5–15)
AST SERPL-CCNC: 18 U/L (ref 1–40)
BASOPHILS # BLD AUTO: 0 10*3/MM3 (ref 0–0.2)
BASOPHILS NFR BLD AUTO: 0 % (ref 0–1.5)
BILIRUB SERPL-MCNC: 0.6 MG/DL (ref 0–1.2)
BUN SERPL-MCNC: 19 MG/DL (ref 8–23)
BUN/CREAT SERPL: 17 (ref 7–25)
CALCIUM SPEC-SCNC: 9.1 MG/DL (ref 8.6–10.5)
CHLORIDE SERPL-SCNC: 99 MMOL/L (ref 98–107)
CO2 SERPL-SCNC: 27.9 MMOL/L (ref 22–29)
CREAT SERPL-MCNC: 1.12 MG/DL (ref 0.76–1.27)
D-LACTATE SERPL-SCNC: 1.3 MMOL/L (ref 0.5–2)
DEPRECATED RDW RBC AUTO: 46.9 FL (ref 37–54)
EGFRCR SERPLBLD CKD-EPI 2021: 64 ML/MIN/1.73
EOSINOPHIL # BLD AUTO: 0 10*3/MM3 (ref 0–0.4)
EOSINOPHIL NFR BLD AUTO: 0 % (ref 0.3–6.2)
ERYTHROCYTE [DISTWIDTH] IN BLOOD BY AUTOMATED COUNT: 14.2 % (ref 12.3–15.4)
GLOBULIN UR ELPH-MCNC: 3.2 GM/DL
GLUCOSE SERPL-MCNC: 109 MG/DL (ref 65–99)
HCT VFR BLD AUTO: 39.4 % (ref 37.5–51)
HGB BLD-MCNC: 13.1 G/DL (ref 13–17.7)
IMM GRANULOCYTES # BLD AUTO: 0.03 10*3/MM3 (ref 0–0.05)
IMM GRANULOCYTES NFR BLD AUTO: 0.3 % (ref 0–0.5)
LYMPHOCYTES # BLD AUTO: 1.49 10*3/MM3 (ref 0.7–3.1)
LYMPHOCYTES NFR BLD AUTO: 16.5 % (ref 19.6–45.3)
MCH RBC QN AUTO: 31.2 PG (ref 26.6–33)
MCHC RBC AUTO-ENTMCNC: 33.2 G/DL (ref 31.5–35.7)
MCV RBC AUTO: 93.8 FL (ref 79–97)
MONOCYTES # BLD AUTO: 1.05 10*3/MM3 (ref 0.1–0.9)
MONOCYTES NFR BLD AUTO: 11.6 % (ref 5–12)
NEUTROPHILS NFR BLD AUTO: 6.48 10*3/MM3 (ref 1.7–7)
NEUTROPHILS NFR BLD AUTO: 71.6 % (ref 42.7–76)
NRBC BLD AUTO-RTO: 0 /100 WBC (ref 0–0.2)
PLATELET # BLD AUTO: 226 10*3/MM3 (ref 140–450)
PMV BLD AUTO: 9.2 FL (ref 6–12)
POTASSIUM SERPL-SCNC: 4.1 MMOL/L (ref 3.5–5.2)
PROCALCITONIN SERPL-MCNC: 0.08 NG/ML (ref 0–0.25)
PROT SERPL-MCNC: 6.2 G/DL (ref 6–8.5)
RBC # BLD AUTO: 4.2 10*6/MM3 (ref 4.14–5.8)
SODIUM SERPL-SCNC: 137 MMOL/L (ref 136–145)
WBC NRBC COR # BLD AUTO: 9.05 10*3/MM3 (ref 3.4–10.8)

## 2025-03-18 PROCEDURE — 25010000002 PIPERACILLIN SOD-TAZOBACTAM PER 1 G: Performed by: STUDENT IN AN ORGANIZED HEALTH CARE EDUCATION/TRAINING PROGRAM

## 2025-03-18 PROCEDURE — G0378 HOSPITAL OBSERVATION PER HR: HCPCS

## 2025-03-18 PROCEDURE — 99285 EMERGENCY DEPT VISIT HI MDM: CPT

## 2025-03-18 PROCEDURE — 87040 BLOOD CULTURE FOR BACTERIA: CPT | Performed by: STUDENT IN AN ORGANIZED HEALTH CARE EDUCATION/TRAINING PROGRAM

## 2025-03-18 PROCEDURE — 84145 PROCALCITONIN (PCT): CPT | Performed by: STUDENT IN AN ORGANIZED HEALTH CARE EDUCATION/TRAINING PROGRAM

## 2025-03-18 PROCEDURE — 83605 ASSAY OF LACTIC ACID: CPT | Performed by: STUDENT IN AN ORGANIZED HEALTH CARE EDUCATION/TRAINING PROGRAM

## 2025-03-18 PROCEDURE — 85025 COMPLETE CBC W/AUTO DIFF WBC: CPT | Performed by: STUDENT IN AN ORGANIZED HEALTH CARE EDUCATION/TRAINING PROGRAM

## 2025-03-18 PROCEDURE — 36415 COLL VENOUS BLD VENIPUNCTURE: CPT | Performed by: STUDENT IN AN ORGANIZED HEALTH CARE EDUCATION/TRAINING PROGRAM

## 2025-03-18 PROCEDURE — 80053 COMPREHEN METABOLIC PANEL: CPT | Performed by: STUDENT IN AN ORGANIZED HEALTH CARE EDUCATION/TRAINING PROGRAM

## 2025-03-18 PROCEDURE — 71045 X-RAY EXAM CHEST 1 VIEW: CPT

## 2025-03-18 RX ADMIN — PIPERACILLIN AND TAZOBACTAM 3.38 G: 3; .375 INJECTION, POWDER, FOR SOLUTION INTRAVENOUS at 19:56

## 2025-03-18 NOTE — ED NOTES
"Nursing report ED to floor  Tony Leonard  84 y.o.  male    HPI :  HPI  Stated Reason for Visit: gen weakness/cough    Chief Complaint  Chief Complaint   Patient presents with    Weakness - Generalized    Cough       Admitting doctor:   Elisa Tony MD    Admitting diagnosis:   The encounter diagnosis was Pneumonia of left lower lobe due to infectious organism.    Code status:   Current Code Status       Date Active Code Status Order ID Comments User Context       Prior            Allergies:   Daliresp [roflumilast], Latex, and Sulfa antibiotics    Isolation:   No active isolations    Intake and Output  No intake or output data in the 24 hours ending 03/18/25 1956    Weight:       03/18/25 1812   Weight: 85.5 kg (188 lb 7.9 oz)       Most recent vitals:   Vitals:    03/18/25 1812 03/18/25 1822 03/18/25 1901 03/18/25 1931   BP: 121/74 123/73 114/66 102/86   Pulse: 100 104 94 96   Resp: 22      Temp: 98.5 °F (36.9 °C)      SpO2: 95% 91% 96% 97%   Weight: 85.5 kg (188 lb 7.9 oz)      Height: 180.3 cm (70.98\")          Active LDAs/IV Access:   Lines, Drains & Airways       Active LDAs       Name Placement date Placement time Site Days    Peripheral IV 03/18/25 1947 Posterior;Right Hand 03/18/25 1947  Hand  less than 1                    Labs (abnormal labs have a star):   Labs Reviewed   COMPREHENSIVE METABOLIC PANEL - Abnormal; Notable for the following components:       Result Value    Glucose 109 (*)     Albumin 3.0 (*)     All other components within normal limits    Narrative:     GFR Categories in Chronic Kidney Disease (CKD)      GFR Category          GFR (mL/min/1.73)    Interpretation  G1                     90 or greater         Normal or high (1)  G2                      60-89                Mild decrease (1)  G3a                   45-59                Mild to moderate decrease  G3b                   30-44                Moderate to severe decrease  G4                    15-29                Severe " "decrease  G5                    14 or less           Kidney failure          (1)In the absence of evidence of kidney disease, neither GFR category G1 or G2 fulfill the criteria for CKD.    eGFR calculation 2021 CKD-EPI creatinine equation, which does not include race as a factor   CBC WITH AUTO DIFFERENTIAL - Abnormal; Notable for the following components:    Lymphocyte % 16.5 (*)     Eosinophil % 0.0 (*)     Monocytes, Absolute 1.05 (*)     All other components within normal limits   PROCALCITONIN - Normal    Narrative:     As a Marker for Sepsis (Non-Neonates):    1. <0.5 ng/mL represents a low risk of severe sepsis and/or septic shock.  2. >2 ng/mL represents a high risk of severe sepsis and/or septic shock.    As a Marker for Lower Respiratory Tract Infections that require antibiotic therapy:    PCT on Admission    Antibiotic Therapy       6-12 Hrs later    >0.5                Strongly Recommended  >0.25 - <0.5        Recommended   0.1 - 0.25          Discouraged              Remeasure/reassess PCT  <0.1                Strongly Discouraged     Remeasure/reassess PCT    As 28 day mortality risk marker: \"Change in Procalcitonin Result\" (>80% or <=80%) if Day 0 (or Day 1) and Day 4 values are available. Refer to http://[email or web address]-[email or web address]-[email or web address]    Change in PCT <=80%  A decrease of PCT levels below or equal to 80% defines a positive change in PCT test result representing a higher risk for 28-day all-cause mortality of patients diagnosed with severe sepsis for septic shock.    Change in PCT >80%  A decrease of PCT levels of more than 80% defines a negative change in PCT result representing a lower risk for 28-day all-cause mortality of patients diagnosed with severe sepsis or septic shock.      LACTIC ACID, PLASMA - Normal   BLOOD CULTURE   BLOOD CULTURE   CBC AND DIFFERENTIAL    Narrative:     The following orders were created for panel order CBC & Differential.  Procedure                               " Abnormality         Status                     ---------                               -----------         ------                     CBC Auto Differential[[id number]]        Abnormal            Final result                 Please view results for these tests on the individual orders.       EKG:   No orders to display       Meds given in ED:   Medications   piperacillin-tazobactam (ZOSYN) 3.375 g IVPB in 100 mL NS MBP (CD) (has no administration in time range)       Imaging results:  XR Chest 1 View  Result Date: 3/18/2025  Left basilar infiltrate, follow-up recommended.  This report was finalized on 3/18/2025 6:49 PM by Dr. Tj Evans M.D on Workstation: [id number]        Ambulatory status:   - walker    Social issues:   Social History     Socioeconomic History    Marital status: Single   Tobacco Use    Smoking status: Never    Smokeless tobacco: Never   Vaping Use    Vaping status: Never Used   Substance and Sexual Activity    Alcohol use: No     Comment: red wine occassional    Drug use: No    Sexual activity: Defer       Peripheral Neurovascular  Peripheral Neurovascular (Adult)  Peripheral Neurovascular WDL: WDL    Neuro Cognitive  Neuro Cognitive (Adult)  Cognitive/Neuro/Behavioral WDL: WDL, orientation  Orientation: oriented x 4    Learning  Learning Assessment  Learning Readiness and Ability: no barriers identified  Education Provided  Person Taught: patient  Teaching Method: verbal instruction  Teaching Focus: symptom/problem overview  Education Outcome Evaluation: acceptance expressed, eager to learn, verbalizes understanding    Respiratory  Respiratory  Airway WDL: WDL  Respiratory WDL  Respiratory WDL: .WDL except, cough, all  Rhythm/Pattern, Respiratory: no shortness of breath reported, shallow, unlabored  Cough Frequency: infrequent  Cough Type: no productive sputum    Abdominal Pain       Pain Assessments  Pain (Adult)  (0-10) Pain Rating: Rest: 0    NIH Stroke Scale       Ashly Spence  RN  03/18/25 19:56 EDT

## 2025-03-18 NOTE — ED PROVIDER NOTES
EMERGENCY DEPARTMENT ENCOUNTER  Room Number:  38/38  PCP: Alfonso Goldstein MD  Independent Historians: Patient      HPI:  Chief Complaint: had concerns including Weakness - Generalized and Cough.     Context: Tony Leonard is a 84 y.o. male with a medical history of GERD, COPD, CHF, CAD who presents to the ED c/o acute fevers and hypoxic respiratory failure as well as generalized weakness.  Patient recently admitted with pneumonia and had been discharged to a nursing facility.  Patient states he had a fever there and increased weakness and due to his lack of improvement was sent back to the ER.  Patient has ongoing cough and is on supplementary O2.      Review of prior external notes (non-ED) -and- Review of prior external test results outside of this encounter: Discharge summary from 3/4/2025 reviewed and notable for admission secondary to left lower lobe pneumonia.  Patient was treated for COPD exacerbation and pneumonia.  Patient discharged to skilled nursing facility.    Prescription drug monitoring program review:         PAST MEDICAL HISTORY  Active Ambulatory Problems     Diagnosis Date Noted    Thrush, oral 03/11/2016    ADD (attention deficit disorder) 03/11/2016    Hemorrhoids 03/11/2016    Bronchiectasis with acute lower respiratory infection 03/11/2016    Diverticul disease small and large intestine, no perforati or abscess 03/11/2016    Benign non-nodular prostatic hyperplasia without lower urinary tract symptoms 03/11/2016    Gastroesophageal reflux disease 03/11/2016    Malaise and fatigue 03/11/2016    GERD (gastroesophageal reflux disease) 04/25/2016    Environmental allergies 04/25/2016    Hemoptysis 04/27/2016    Dyslipidemia 05/11/2016    Primary osteoarthritis involving multiple joints 05/11/2016    Pneumonia of right lower lobe due to infectious organism 10/03/2016    Abnormal EKG 10/04/2016    COPD (chronic obstructive pulmonary disease) 10/04/2016    Mild malnutrition 03/28/2017     Acute on chronic respiratory failure with hypoxia 04/27/2017    Fracture, intertrochanteric, right femur, closed, initial encounter 01/16/2018    Chronic diastolic CHF (congestive heart failure) 01/16/2018    Hematoma of frontal scalp 01/16/2018    Postoperative anemia due to acute blood loss 01/19/2018    Urinary retention 01/25/2018    Hemorrhagic disorder due to circulating anticoagulants 01/29/2018    History of fracture of right hip 02/22/2018    Closed fracture of right hip with routine healing 02/28/2018    Chronic low back pain with sciatica 06/21/2018    Change in bowel function 04/18/2019    Rectal bleeding 04/18/2019    Prostate cancer 04/22/2019    On home oxygen therapy 09/24/2019    Acute viral bronchitis 12/29/2019    Neuropathy 07/01/2021    Plantar fasciitis 09/08/2021    HTN (hypertension) 05/06/2022    Bilateral lower extremity edema 05/07/2022    Microscopic hematuria 05/08/2022    Acute midline low back pain with right-sided sciatica 08/01/2022    Spinal stenosis, lumbar region with neurogenic claudication 08/02/2022    Compression deformity of vertebra 08/02/2022    Rectal bleed 09/23/2022    Esophagitis 09/24/2022    Angiectasia 09/27/2022    Hiatal hernia 09/27/2022    Overweight (BMI 25.0-29.9) 11/14/2022    Leukocytosis 11/15/2022    Bilateral hip pain 11/21/2022    Elevated troponin level not due myocardial infarction 12/10/2022    Nocturnal hypoxia 12/10/2022    Pneumonia of right upper lobe due to infectious organism 12/29/2022    NSTEMI (non-ST elevated myocardial infarction) 01/29/2023    Chronic respiratory failure with hypoxia 01/29/2023    Paroxysmal atrial fibrillation 02/17/2023    Acute exacerbation of chronic obstructive pulmonary disease (COPD) 05/10/2023    Anemia 05/10/2023    Non-compliant patient 05/11/2023    Hypokalemia 10/01/2020    Spondylolisthesis of lumbar region 08/14/2018    Elevated troponin 12/22/2023    Rhabdomyolysis 12/26/2023    Multiple contusions 12/26/2023     Fall 12/26/2023    Dizziness 04/09/2024    Contusion of hip 04/09/2024    Pulmonary embolism 05/21/2024    COPD with acute exacerbation 11/25/2024    Influenza A 02/16/2025    Altered mental status 02/16/2025    Pneumonia 02/26/2025    Pneumonia due to E. coli 03/04/2025     Resolved Ambulatory Problems     Diagnosis Date Noted    Pneumonia of both lower lobes due to infectious organism 03/11/2016    Pneumonia of right upper lobe due to infectious organism 04/22/2016    COPD exacerbation 04/25/2016    Chronic respiratory failure with hypoxia 10/04/2016    Bacterial lobar pneumonia 12/27/2016    Pneumonia of left lower lobe due to infectious organism 03/26/2017    Bronchitis 06/01/2017    COPD exacerbation 06/17/2017    Leukocytosis 01/16/2018    Right upper lobe pneumonia 01/20/2018    Mucus plugging of bronchi 01/16/2018    COPD exacerbation 04/16/2018    Degenerative joint disease (DJD) of hip 09/23/2019    Bronchiectasis with (acute) exacerbation 12/28/2019    COPD exacerbation 05/07/2022    Septic shock 11/26/2022    Acute saddle pulmonary embolism without acute cor pulmonale  - 12/11/2022 12/11/2022    Chest pain 04/19/2024     Past Medical History:   Diagnosis Date    ADHD (attention deficit hyperactivity disorder)     Altered mental status, unspecified     Anemia, unspecified     Attention-deficit hyperactivity disorder, unspecified type     Benign prostatic hyperplasia without lower urinary tract symptoms     Body mass index 26.0-26.9, adult     Bronchiectasis     Bronchiectasis, uncomplicated     Chest pain, unspecified     CHF (congestive heart failure)     Chronic diastolic (congestive) heart failure     Cognitive communication deficit     Colon polyp     Depression, unspecified     Diaphragmatic hernia without obstruction or gangrene     Diverticulosis     Diverticulosis of both small and large intestine without perforation or abscess without bleeding     Dorsalgia, unspecified     Encounter for  immunization     Eosinophilic asthma     Essential (primary) hypertension     Gastroesophageal reflux disease without esophagitis     Hard of hearing     Hyperlipidemia, unspecified     Hypertensive heart disease with congestive heart failure     Hypoxemia     Influenza due to other identified influenza virus with other respiratory manifestations     Insomnia, unspecified     Localized edema     Mild protein-calorie malnutrition     Muscle weakness (generalized)     Other specified abnormal findings of blood chemistry     Overweight     Personal history of malignant neoplasm of prostate     Polyneuropathy, unspecified     Restless leg syndrome     Sleep related hypoventilation in conditions classified elsewhere     Spinal stenosis, lumbar region, with neurogenic claudication     Weakness          PAST SURGICAL HISTORY  Past Surgical History:   Procedure Laterality Date    BRONCHOSCOPY N/A 04/30/2016    Procedure: BRONCHOSCOPY with BAL of right lower lobe and left  lower lobe.  ;  Surgeon: Nando Diaz MD;  Location: [facility] ENDOSCOPY;  Service:     BRONCHOSCOPY N/A 04/28/2017    Procedure: BRONCHOSCOPY;  Surgeon: Katharina Salter MD;  Location: [facility] ENDOSCOPY;  Service:     BRONCHOSCOPY Bilateral 06/29/2017    Procedure: BRONCHOSCOPY WITH WASHINGS;  Surgeon: Katharina Salter MD;  Location: [facility] ENDOSCOPY;  Service:     BRONCHOSCOPY N/A 01/22/2018    Procedure: BRONCHOSCOPY AT BEDSIDE with BAL;  Surgeon: Katharina Salter MD;  Location: [facility] ENDOSCOPY;  Service:     BRONCHOSCOPY N/A 01/30/2018    Procedure: BRONCHOSCOPY with BAL and washing;  Surgeon: Shreyas Wild MD;  Location: [facility] ENDOSCOPY;  Service:     BRONCHOSCOPY Bilateral 04/24/2018    Procedure: BRONCHOSCOPY WITH WASHING;  Surgeon: Katharina Salter MD;  Location: [facility] ENDOSCOPY;  Service: Pulmonary    BRONCHOSCOPY N/A 12/28/2019    Procedure: BRONCHOSCOPY with bilateral washing;  Surgeon: Katharina Salter MD;  Location: [facility] ENDOSCOPY;  Service:  Pulmonary    BRONCHOSCOPY Bilateral 01/04/2023    Procedure: BRONCHOSCOPY with lavage;  Surgeon: Katharina Salter MD;  Location: [facility] ENDOSCOPY;  Service: Pulmonary;  Laterality: Bilateral;  mucus plugging    CARDIAC CATHETERIZATION N/A 01/29/2023    Procedure: Left Heart Cath;  Surgeon: Johnnie Ang MD;  Location: [facility] CATH INVASIVE LOCATION;  Service: Cardiology;  Laterality: N/A;    CARDIAC CATHETERIZATION N/A 01/29/2023    Procedure: Coronary angiography;  Surgeon: Johnnie Ang MD;  Location: [facility] CATH INVASIVE LOCATION;  Service: Cardiology;  Laterality: N/A;    COLONOSCOPY  05/17/2013    eh, ih, tort, sig tics    COLONOSCOPY N/A 05/10/2019    Non-thrombosed external hemorrhoids found on perianal exam, Diverticulosis, Tortuous colon, One 5 mm polyp in the mid ascending colon, IH. Path: Tubular adenoma.     COLONOSCOPY N/A 09/24/2022    Procedure: COLONOSCOPY to cecum and TI with argon plasma coagulation.;  Surgeon: Bruce Black MD;  Location: [facility] ENDOSCOPY;  Service: Gastroenterology;  Laterality: N/A;  pre- GI bleeding  post- radiation proctitis, diverticulosis    ENDOSCOPY  03/19/2015    z line irreg, hh    ENDOSCOPY N/A 09/24/2022    Procedure: ESOPHAGOGASTRODUODENOSCOPY;  Surgeon: Bruce Black MD;  Location: [facility] ENDOSCOPY;  Service: Gastroenterology;  Laterality: N/A;  pre- GI bleeding  post- esophagitis, hiatal hernia    HIP OPEN REDUCTION Right 01/17/2018    Procedure: HIP OPEN REDUCTION INTERNAL FIXATION WITH DYNAMIC HIP SCREW;  Surgeon: Les Black MD;  Location: [facility] MAIN OR;  Service:     SPINE SURGERY      TONSILLECTOMY      TOTAL HIP ARTHROPLASTY REVISION Right 09/23/2019    Procedure: HIP  REVISION RIGHT;  Surgeon: Isauro Warner MD;  Location: [facility] MAIN OR;  Service: Orthopedics         FAMILY HISTORY  Family History   Problem Relation Age of Onset    Thyroid disease Mother     No Known Problems Father     Malig Hyperthermia Neg Hx          SOCIAL  HISTORY  Social History     Socioeconomic History    Marital status: Single   Tobacco Use    Smoking status: Never    Smokeless tobacco: Never   Vaping Use    Vaping status: Never Used   Substance and Sexual Activity    Alcohol use: No     Comment: red wine occassional    Drug use: No    Sexual activity: Defer         ALLERGIES  Daliresp [roflumilast], Latex, and Sulfa antibiotics      REVIEW OF SYSTEMS  Review of Systems  Included in HPI  All systems reviewed and negative except for those discussed in HPI.      PHYSICAL EXAM    I have reviewed the triage vital signs and nursing notes.    ED Triage Vitals [03/18/25 1812]   Temp Heart Rate Resp BP SpO2   98.5 °F (36.9 °C) 100 22 121/74 95 %      Temp src Heart Rate Source Patient Position BP Location FiO2 (%)   -- -- -- -- --       Physical Exam  GENERAL: alert, no acute distress  SKIN: Warm, dry  HENT: Normocephalic, atraumatic  EYES: no scleral icterus  CV: regular rhythm, regular rate  RESPIRATORY: normal effort, coarse breath sounds bilaterally, greatest on left, on supplementary O2  ABDOMEN: soft, nontender, nondistended  MUSCULOSKELETAL: no deformity  NEURO: alert, moves all extremities, follows commands            LAB RESULTS  Recent Results (from the past 24 hours)   Comprehensive Metabolic Panel    Collection Time: 03/18/25  6:39 PM    Specimen: Arm, Left; Blood   Result Value Ref Range    Glucose 109 (H) 65 - 99 mg/dL    BUN 19 8 - 23 mg/dL    Creatinine 1.12 0.76 - 1.27 mg/dL    Sodium 137 136 - 145 mmol/L    Potassium 4.1 3.5 - 5.2 mmol/L    Chloride 99 98 - 107 mmol/L    CO2 27.9 22.0 - 29.0 mmol/L    Calcium 9.1 8.6 - 10.5 mg/dL    Total Protein 6.2 6.0 - 8.5 g/dL    Albumin 3.0 (L) 3.5 - 5.2 g/dL    ALT (SGPT) 19 1 - 41 U/L    AST (SGOT) 18 1 - 40 U/L    Alkaline Phosphatase 73 39 - 117 U/L    Total Bilirubin 0.6 0.0 - 1.2 mg/dL    Globulin 3.2 gm/dL    A/G Ratio 0.9 g/dL    BUN/Creatinine Ratio 17.0 7.0 - 25.0    Anion Gap 10.1 5.0 - 15.0 mmol/L     eGFR 64.0 >60.0 mL/min/1.73   Procalcitonin    Collection Time: 03/18/25  6:39 PM    Specimen: Arm, Left; Blood   Result Value Ref Range    Procalcitonin 0.08 0.00 - 0.25 ng/mL   Lactic Acid, Plasma    Collection Time: 03/18/25  6:39 PM    Specimen: Arm, Left; Blood   Result Value Ref Range    Lactate 1.3 0.5 - 2.0 mmol/L   CBC Auto Differential    Collection Time: 03/18/25  6:39 PM    Specimen: Arm, Left; Blood   Result Value Ref Range    WBC 9.05 3.40 - 10.80 10*3/mm3    RBC 4.20 4.14 - 5.80 10*6/mm3    Hemoglobin 13.1 13.0 - 17.7 g/dL    Hematocrit 39.4 37.5 - 51.0 %    MCV 93.8 79.0 - 97.0 fL    MCH 31.2 26.6 - 33.0 pg    MCHC 33.2 31.5 - 35.7 g/dL    RDW 14.2 12.3 - 15.4 %    RDW-SD 46.9 37.0 - 54.0 fl    MPV 9.2 6.0 - 12.0 fL    Platelets 226 140 - 450 10*3/mm3    Neutrophil % 71.6 42.7 - 76.0 %    Lymphocyte % 16.5 (L) 19.6 - 45.3 %    Monocyte % 11.6 5.0 - 12.0 %    Eosinophil % 0.0 (L) 0.3 - 6.2 %    Basophil % 0.0 0.0 - 1.5 %    Immature Grans % 0.3 0.0 - 0.5 %    Neutrophils, Absolute 6.48 1.70 - 7.00 10*3/mm3    Lymphocytes, Absolute 1.49 0.70 - 3.10 10*3/mm3    Monocytes, Absolute 1.05 (H) 0.10 - 0.90 10*3/mm3    Eosinophils, Absolute 0.00 0.00 - 0.40 10*3/mm3    Basophils, Absolute 0.00 0.00 - 0.20 10*3/mm3    Immature Grans, Absolute 0.03 0.00 - 0.05 10*3/mm3    nRBC 0.0 0.0 - 0.2 /100 WBC         RADIOLOGY  XR Chest 1 View  Result Date: 3/18/2025  XR CHEST 1 VW-  HISTORY: Male who is 84 years-old, suspected pneumonia  TECHNIQUE: Frontal view of the chest  COMPARISON: 2/26/2025  FINDINGS: The heart size is normal. Aorta is calcified. Pulmonary vasculature is unremarkable. Lung volume is low. Infiltrate the left base suggest pneumonia, follow-up recommended to characterize resolution and to exclude any possibility of an underlying lesion. There could be small left pleural effusion. No pneumothorax. No acute osseous process.      Left basilar infiltrate, follow-up recommended.  This report was finalized  on 3/18/2025 6:49 PM by Dr. Tj Evans M.D on Workstation: [id number]          MEDICATIONS GIVEN IN ER  Medications   piperacillin-tazobactam (ZOSYN) 3.375 g IVPB in 100 mL NS MBP (CD) (has no administration in time range)         ORDERS PLACED DURING THIS VISIT:  Orders Placed This Encounter   Procedures    Blood Culture - Blood,    Blood Culture - Blood,    XR Chest 1 View    Comprehensive Metabolic Panel    Procalcitonin    Lactic Acid, Plasma    CBC Auto Differential    [facility] (on-call MD unless specified) Details    Initiate Observation Status    CBC & Differential         OUTPATIENT MEDICATION MANAGEMENT:  Current Facility-Administered Medications Ordered in Epic   Medication Dose Route Frequency Provider Last Rate Last Admin    piperacillin-tazobactam (ZOSYN) 3.375 g IVPB in 100 mL NS MBP (CD)  3.375 g Intravenous Once Juan Rivera MD         Current Outpatient Medications Ordered in Epic   Medication Sig Dispense Refill    acetaminophen (TYLENOL) 325 MG tablet Take 2 tablets by mouth Every 4 (Four) Hours As Needed for Mild Pain.      albuterol (PROVENTIL) (2.5 MG/3ML) 0.083% nebulizer solution Take 2.5 mg by nebulization Every 6 (Six) Hours As Needed for Wheezing. 360 mL 3    azithromycin (ZITHROMAX) 250 MG tablet Indications: Worsening of Chronic Obstructive Lung Disease 4 tablet 0    dicyclomine (BENTYL) 10 MG capsule Take 1 capsule by mouth 3 (Three) Times a Day As Needed (abdominal bloating). 30 capsule 0    dimethicone (AVEENO) 1.3 % lotion lotion Apply 1 Application topically to the appropriate area as directed Every 12 (Twelve) Hours As Needed.      ferrous sulfate 325 (65 FE) MG tablet Take 1 tablet by mouth Daily With Breakfast.      FLUoxetine (PROzac) 20 MG capsule TAKE 1 CAPSULE DAILY 90 capsule 3    Fluticasone-Umeclidin-Vilant (Trelegy Ellipta) 100-62.5-25 MCG/ACT inhaler Inhale 1 puff Daily.      furosemide (LASIX) 20 MG tablet Take 1 tablet by mouth 2 (Two) Times a Day.       gabapentin (NEURONTIN) 300 MG capsule Take 1 capsule by mouth 3 (Three) Times a Day. 6 capsule 0    guaiFENesin (MUCINEX) 600 MG 12 hr tablet Take 2 tablets by mouth 2 (Two) Times a Day.      Heat Wraps (ThermaCare Back Pain Therapy) misc Use 1 Pad Daily.      Melatonin 3 MG capsule Take 1 capsule by mouth As Needed.      menthol-zinc oxide (CALMOSEPTINE) 0.44-20.625 % ointment ointment Apply 1 Application topically to the appropriate area as directed Every 12 (Twelve) Hours.      multivitamin with minerals (Multivitamin Adult) tablet tablet Take 1 tablet by mouth Daily. 30 tablet 11    ondansetron ODT (ZOFRAN-ODT) 4 MG disintegrating tablet Place 1 tablet on the tongue Every 6 (Six) Hours As Needed for Nausea or Vomiting.      pantoprazole (PROTONIX) 40 MG EC tablet Take 1 tablet by mouth Daily. 90 tablet 3    potassium chloride (KLOR-CON) 20 MEQ packet Take 20 mEq by mouth Daily.      sodium chloride 7 % nebulizer solution nebulizer solution Take 4 mL by nebulization Every 4 (Four) Hours As Needed (pt takes as needed at home).      sucralfate (Carafate) 1 g tablet Take 1 tablet by mouth 4 (Four) Times a Day. 120 tablet 0    tuberculin (Aplisol) 5 UNIT/0.1ML injection Inject 0.1 mL into the appropriate area of the skin as directed by provider 1 (One) Time.           PROCEDURES  Procedures            PROGRESS, DATA ANALYSIS, CONSULTS, AND MEDICAL DECISION MAKING  All labs have been independently interpreted by me.  All radiology studies have been reviewed by me. All EKG's have been independently viewed and interpreted by me.  Discussion below represents my analysis of pertinent findings related to patient's condition, differential diagnosis, treatment plan and final disposition.    Differential diagnosis includes but is not limited to pneumonia, viral URI, postviral syndrome, UTI, electrolyte abnormality, deconditioning.    Clinical Scores:                                       ED Course as of 03/18/25 1952   Tue  Mar 18, 2025   1854 Chest x-ray interpreted by me demonstrates left basilar infiltrate [MW]   1952 Laboratory evaluation is overall unremarkable and patient's vital signs are stable.  Patient is on 2 L O2.  Chest x-ray does appear to demonstrate worsening left basilar infiltrate compared to previous x-ray.  Will reinitiate antibiotics and admit patient for further evaluation and management. [MW]   1952 Discussed with [employer] with [facility] who agrees to admit [MW]      ED Course User Index  [MW] Juan Rivera MD             AS OF 19:52 EDT VITALS:    BP - 114/66  HR - 94  TEMP - 98.5 °F (36.9 °C)  O2 SATS - 96%    COMPLEXITY OF CARE  The patient requires admission.      DIAGNOSIS  Final diagnoses:   Pneumonia of left lower lobe due to infectious organism         DISPOSITION  ED Disposition       ED Disposition   Decision to Admit    Condition   --    Comment   Level of Care: Telemetry [5]   Diagnosis: Pneumonia [446207]   Admitting Physician: SALVADOR CHRISTINA [[id number]]   Attending Physician: SALVADOR CHRISTINA [[id number]]   Is patient appropriate for Inpatient Observation Unit?: No [0]                  Please note that portions of this document were completed with a voice recognition program.    Note Disclaimer: At [facility], we believe that sharing information builds trust and better relationships. You are receiving this note because you recently visited [facility]. It is possible you will see health information before a provider has talked with you about it. This kind of information can be easy to misunderstand. To help you fully understand what it means for your health, we urge you to discuss this note with your provider.         Juan Rivera MD  03/18/25 1952

## 2025-03-18 NOTE — ED NOTES
Pt to Er via EMS from [facility] for generalized weakness. Pt was recently dx with pna- started on abx which aren't helping

## 2025-03-19 LAB
ALBUMIN SERPL-MCNC: 2.9 G/DL (ref 3.5–5.2)
ALBUMIN/GLOB SERPL: 1.4 G/DL
ALP SERPL-CCNC: 67 U/L (ref 39–117)
ALT SERPL W P-5'-P-CCNC: 17 U/L (ref 1–41)
ANION GAP SERPL CALCULATED.3IONS-SCNC: 8 MMOL/L (ref 5–15)
AST SERPL-CCNC: 16 U/L (ref 1–40)
B PARAPERT DNA SPEC QL NAA+PROBE: NOT DETECTED
B PERT DNA SPEC QL NAA+PROBE: NOT DETECTED
BILIRUB SERPL-MCNC: 0.4 MG/DL (ref 0–1.2)
BUN SERPL-MCNC: 20 MG/DL (ref 8–23)
BUN/CREAT SERPL: 18.9 (ref 7–25)
C PNEUM DNA NPH QL NAA+NON-PROBE: NOT DETECTED
CALCIUM SPEC-SCNC: 8.4 MG/DL (ref 8.6–10.5)
CHLORIDE SERPL-SCNC: 105 MMOL/L (ref 98–107)
CO2 SERPL-SCNC: 27 MMOL/L (ref 22–29)
CREAT SERPL-MCNC: 1.06 MG/DL (ref 0.76–1.27)
DEPRECATED RDW RBC AUTO: 45.7 FL (ref 37–54)
EGFRCR SERPLBLD CKD-EPI 2021: 68.3 ML/MIN/1.73
ERYTHROCYTE [DISTWIDTH] IN BLOOD BY AUTOMATED COUNT: 13.9 % (ref 12.3–15.4)
FLUAV SUBTYP SPEC NAA+PROBE: NOT DETECTED
FLUBV RNA ISLT QL NAA+PROBE: NOT DETECTED
GLOBULIN UR ELPH-MCNC: 2.1 GM/DL
GLUCOSE SERPL-MCNC: 132 MG/DL (ref 65–99)
HADV DNA SPEC NAA+PROBE: NOT DETECTED
HCOV 229E RNA SPEC QL NAA+PROBE: NOT DETECTED
HCOV HKU1 RNA SPEC QL NAA+PROBE: NOT DETECTED
HCOV NL63 RNA SPEC QL NAA+PROBE: NOT DETECTED
HCOV OC43 RNA SPEC QL NAA+PROBE: NOT DETECTED
HCT VFR BLD AUTO: 34.3 % (ref 37.5–51)
HGB BLD-MCNC: 11.4 G/DL (ref 13–17.7)
HMPV RNA NPH QL NAA+NON-PROBE: NOT DETECTED
HPIV1 RNA ISLT QL NAA+PROBE: NOT DETECTED
HPIV2 RNA SPEC QL NAA+PROBE: NOT DETECTED
HPIV3 RNA NPH QL NAA+PROBE: NOT DETECTED
HPIV4 P GENE NPH QL NAA+PROBE: NOT DETECTED
L PNEUMO1 AG UR QL IA: NEGATIVE
M PNEUMO IGG SER IA-ACNC: NOT DETECTED
MCH RBC QN AUTO: 31.1 PG (ref 26.6–33)
MCHC RBC AUTO-ENTMCNC: 33.2 G/DL (ref 31.5–35.7)
MCV RBC AUTO: 93.5 FL (ref 79–97)
PLATELET # BLD AUTO: 205 10*3/MM3 (ref 140–450)
PMV BLD AUTO: 9.5 FL (ref 6–12)
POTASSIUM SERPL-SCNC: 3.7 MMOL/L (ref 3.5–5.2)
PROT SERPL-MCNC: 5 G/DL (ref 6–8.5)
RBC # BLD AUTO: 3.67 10*6/MM3 (ref 4.14–5.8)
RHINOVIRUS RNA SPEC NAA+PROBE: NOT DETECTED
RSV RNA NPH QL NAA+NON-PROBE: NOT DETECTED
S PNEUM AG SPEC QL LA: NEGATIVE
SARS-COV-2 RNA NPH QL NAA+NON-PROBE: NOT DETECTED
SODIUM SERPL-SCNC: 140 MMOL/L (ref 136–145)
WBC NRBC COR # BLD AUTO: 6.57 10*3/MM3 (ref 3.4–10.8)

## 2025-03-19 PROCEDURE — 25010000002 AMPICILLIN-SULBACTAM PER 1.5 G: Performed by: NURSE PRACTITIONER

## 2025-03-19 PROCEDURE — 94799 UNLISTED PULMONARY SVC/PX: CPT

## 2025-03-19 PROCEDURE — 87449 NOS EACH ORGANISM AG IA: CPT | Performed by: NURSE PRACTITIONER

## 2025-03-19 PROCEDURE — 85027 COMPLETE CBC AUTOMATED: CPT | Performed by: NURSE PRACTITIONER

## 2025-03-19 PROCEDURE — 94760 N-INVAS EAR/PLS OXIMETRY 1: CPT

## 2025-03-19 PROCEDURE — 80053 COMPREHEN METABOLIC PANEL: CPT | Performed by: NURSE PRACTITIONER

## 2025-03-19 PROCEDURE — 97116 GAIT TRAINING THERAPY: CPT

## 2025-03-19 PROCEDURE — 94761 N-INVAS EAR/PLS OXIMETRY MLT: CPT

## 2025-03-19 PROCEDURE — 63710000001 REVEFENACIN 175 MCG/3ML SOLUTION: Performed by: STUDENT IN AN ORGANIZED HEALTH CARE EDUCATION/TRAINING PROGRAM

## 2025-03-19 PROCEDURE — G0378 HOSPITAL OBSERVATION PER HR: HCPCS

## 2025-03-19 PROCEDURE — 97530 THERAPEUTIC ACTIVITIES: CPT

## 2025-03-19 PROCEDURE — 87205 SMEAR GRAM STAIN: CPT | Performed by: NURSE PRACTITIONER

## 2025-03-19 PROCEDURE — 0202U NFCT DS 22 TRGT SARS-COV-2: CPT | Performed by: NURSE PRACTITIONER

## 2025-03-19 PROCEDURE — 94664 DEMO&/EVAL PT USE INHALER: CPT

## 2025-03-19 PROCEDURE — 97161 PT EVAL LOW COMPLEX 20 MIN: CPT

## 2025-03-19 PROCEDURE — 94640 AIRWAY INHALATION TREATMENT: CPT

## 2025-03-19 PROCEDURE — 87070 CULTURE OTHR SPECIMN AEROBIC: CPT | Performed by: NURSE PRACTITIONER

## 2025-03-19 RX ORDER — FERROUS SULFATE 325(65) MG
325 TABLET ORAL
Status: DISCONTINUED | OUTPATIENT
Start: 2025-03-19 | End: 2025-03-19

## 2025-03-19 RX ORDER — ACETAMINOPHEN 650 MG/1
650 SUPPOSITORY RECTAL EVERY 4 HOURS PRN
Status: DISCONTINUED | OUTPATIENT
Start: 2025-03-19 | End: 2025-03-21 | Stop reason: HOSPADM

## 2025-03-19 RX ORDER — ACETAMINOPHEN 325 MG/1
650 TABLET ORAL EVERY 4 HOURS PRN
Status: DISCONTINUED | OUTPATIENT
Start: 2025-03-19 | End: 2025-03-21 | Stop reason: HOSPADM

## 2025-03-19 RX ORDER — PANTOPRAZOLE SODIUM 40 MG/1
40 TABLET, DELAYED RELEASE ORAL
Status: DISCONTINUED | OUTPATIENT
Start: 2025-03-19 | End: 2025-03-21 | Stop reason: HOSPADM

## 2025-03-19 RX ORDER — POLYETHYLENE GLYCOL 3350 17 G/17G
17 POWDER, FOR SOLUTION ORAL DAILY PRN
Status: DISCONTINUED | OUTPATIENT
Start: 2025-03-19 | End: 2025-03-21 | Stop reason: HOSPADM

## 2025-03-19 RX ORDER — FERROUS SULFATE 325(65) MG
325 TABLET ORAL EVERY OTHER DAY
Status: DISCONTINUED | OUTPATIENT
Start: 2025-03-20 | End: 2025-03-21 | Stop reason: HOSPADM

## 2025-03-19 RX ORDER — L.ACID,PARA/B.BIFIDUM/S.THERM 8B CELL
1 CAPSULE ORAL DAILY
Status: DISCONTINUED | OUTPATIENT
Start: 2025-03-19 | End: 2025-03-21 | Stop reason: HOSPADM

## 2025-03-19 RX ORDER — SODIUM CHLORIDE 0.9 % (FLUSH) 0.9 %
10 SYRINGE (ML) INJECTION EVERY 12 HOURS SCHEDULED
Status: DISCONTINUED | OUTPATIENT
Start: 2025-03-19 | End: 2025-03-21 | Stop reason: HOSPADM

## 2025-03-19 RX ORDER — SODIUM CHLORIDE 9 MG/ML
40 INJECTION, SOLUTION INTRAVENOUS AS NEEDED
Status: DISCONTINUED | OUTPATIENT
Start: 2025-03-19 | End: 2025-03-21 | Stop reason: HOSPADM

## 2025-03-19 RX ORDER — FLUOXETINE 10 MG/1
20 CAPSULE ORAL DAILY
Status: DISCONTINUED | OUTPATIENT
Start: 2025-03-19 | End: 2025-03-21 | Stop reason: HOSPADM

## 2025-03-19 RX ORDER — GABAPENTIN 300 MG/1
300 CAPSULE ORAL 3 TIMES DAILY
Status: DISCONTINUED | OUTPATIENT
Start: 2025-03-19 | End: 2025-03-19

## 2025-03-19 RX ORDER — ONDANSETRON 2 MG/ML
4 INJECTION INTRAMUSCULAR; INTRAVENOUS EVERY 6 HOURS PRN
Status: DISCONTINUED | OUTPATIENT
Start: 2025-03-19 | End: 2025-03-21 | Stop reason: HOSPADM

## 2025-03-19 RX ORDER — ALBUTEROL SULFATE 0.83 MG/ML
2.5 SOLUTION RESPIRATORY (INHALATION) ONCE AS NEEDED
Status: DISCONTINUED | OUTPATIENT
Start: 2025-03-19 | End: 2025-03-21 | Stop reason: HOSPADM

## 2025-03-19 RX ORDER — NITROGLYCERIN 0.4 MG/1
0.4 TABLET SUBLINGUAL
Status: DISCONTINUED | OUTPATIENT
Start: 2025-03-19 | End: 2025-03-21 | Stop reason: HOSPADM

## 2025-03-19 RX ORDER — SODIUM CHLORIDE 0.9 % (FLUSH) 0.9 %
10 SYRINGE (ML) INJECTION AS NEEDED
Status: DISCONTINUED | OUTPATIENT
Start: 2025-03-19 | End: 2025-03-21 | Stop reason: HOSPADM

## 2025-03-19 RX ORDER — POTASSIUM CHLORIDE 1.5 G/1.58G
20 POWDER, FOR SOLUTION ORAL DAILY
Status: DISCONTINUED | OUTPATIENT
Start: 2025-03-19 | End: 2025-03-19

## 2025-03-19 RX ORDER — BUDESONIDE 0.5 MG/2ML
0.5 INHALANT ORAL
Status: DISCONTINUED | OUTPATIENT
Start: 2025-03-19 | End: 2025-03-21 | Stop reason: HOSPADM

## 2025-03-19 RX ORDER — ACETAMINOPHEN 160 MG/5ML
650 SOLUTION ORAL EVERY 4 HOURS PRN
Status: DISCONTINUED | OUTPATIENT
Start: 2025-03-19 | End: 2025-03-21 | Stop reason: HOSPADM

## 2025-03-19 RX ORDER — PANTOPRAZOLE SODIUM 40 MG/1
40 TABLET, DELAYED RELEASE ORAL DAILY
Status: DISCONTINUED | OUTPATIENT
Start: 2025-03-19 | End: 2025-03-19

## 2025-03-19 RX ORDER — NEOMYCIN/BACITRACIN/POLYMYXINB 3.5-400-5K
1 OINTMENT (GRAM) TOPICAL DAILY
COMMUNITY

## 2025-03-19 RX ORDER — ARFORMOTEROL TARTRATE 15 UG/2ML
15 SOLUTION RESPIRATORY (INHALATION)
Status: DISCONTINUED | OUTPATIENT
Start: 2025-03-19 | End: 2025-03-21 | Stop reason: HOSPADM

## 2025-03-19 RX ORDER — AMOXICILLIN 250 MG
2 CAPSULE ORAL 2 TIMES DAILY PRN
Status: DISCONTINUED | OUTPATIENT
Start: 2025-03-19 | End: 2025-03-21 | Stop reason: HOSPADM

## 2025-03-19 RX ORDER — DOXYCYCLINE HYCLATE 100 MG
100 TABLET ORAL 2 TIMES DAILY
COMMUNITY
End: 2025-03-21 | Stop reason: HOSPADM

## 2025-03-19 RX ORDER — GUAIFENESIN 600 MG/1
1200 TABLET, EXTENDED RELEASE ORAL 2 TIMES DAILY
Status: DISCONTINUED | OUTPATIENT
Start: 2025-03-19 | End: 2025-03-21 | Stop reason: HOSPADM

## 2025-03-19 RX ORDER — POTASSIUM CHLORIDE 1500 MG/1
20 TABLET, EXTENDED RELEASE ORAL 2 TIMES DAILY WITH MEALS
Status: DISCONTINUED | OUTPATIENT
Start: 2025-03-19 | End: 2025-03-21 | Stop reason: HOSPADM

## 2025-03-19 RX ORDER — SODIUM CHLORIDE FOR INHALATION 7 %
4 VIAL, NEBULIZER (ML) INHALATION ONCE AS NEEDED
Status: DISCONTINUED | OUTPATIENT
Start: 2025-03-19 | End: 2025-03-21 | Stop reason: HOSPADM

## 2025-03-19 RX ORDER — IPRATROPIUM BROMIDE AND ALBUTEROL SULFATE 2.5; .5 MG/3ML; MG/3ML
3 SOLUTION RESPIRATORY (INHALATION) EVERY 4 HOURS PRN
Status: DISCONTINUED | OUTPATIENT
Start: 2025-03-19 | End: 2025-03-21 | Stop reason: HOSPADM

## 2025-03-19 RX ORDER — FUROSEMIDE 40 MG/1
20 TABLET ORAL
Status: DISCONTINUED | OUTPATIENT
Start: 2025-03-20 | End: 2025-03-21 | Stop reason: HOSPADM

## 2025-03-19 RX ORDER — BISACODYL 10 MG
10 SUPPOSITORY, RECTAL RECTAL DAILY PRN
Status: DISCONTINUED | OUTPATIENT
Start: 2025-03-19 | End: 2025-03-21 | Stop reason: HOSPADM

## 2025-03-19 RX ORDER — FAMOTIDINE 20 MG/1
20 TABLET, FILM COATED ORAL 2 TIMES DAILY PRN
Status: DISCONTINUED | OUTPATIENT
Start: 2025-03-19 | End: 2025-03-21 | Stop reason: HOSPADM

## 2025-03-19 RX ORDER — GABAPENTIN 100 MG/1
200 CAPSULE ORAL 3 TIMES DAILY
Status: DISCONTINUED | OUTPATIENT
Start: 2025-03-19 | End: 2025-03-21 | Stop reason: HOSPADM

## 2025-03-19 RX ORDER — FUROSEMIDE 40 MG/1
20 TABLET ORAL 2 TIMES DAILY
Status: DISCONTINUED | OUTPATIENT
Start: 2025-03-19 | End: 2025-03-19

## 2025-03-19 RX ORDER — BISACODYL 5 MG/1
5 TABLET, DELAYED RELEASE ORAL DAILY PRN
Status: DISCONTINUED | OUTPATIENT
Start: 2025-03-19 | End: 2025-03-21 | Stop reason: HOSPADM

## 2025-03-19 RX ADMIN — Medication 10 ML: at 01:09

## 2025-03-19 RX ADMIN — GABAPENTIN 200 MG: 100 CAPSULE ORAL at 21:16

## 2025-03-19 RX ADMIN — BUDESONIDE 0.5 MG: 0.5 INHALANT RESPIRATORY (INHALATION) at 11:43

## 2025-03-19 RX ADMIN — Medication 10 ML: at 10:09

## 2025-03-19 RX ADMIN — ARFORMOTEROL TARTRATE 15 MCG: 15 SOLUTION RESPIRATORY (INHALATION) at 11:43

## 2025-03-19 RX ADMIN — GABAPENTIN 200 MG: 100 CAPSULE ORAL at 17:20

## 2025-03-19 RX ADMIN — REVEFENACIN 175 MCG: 175 SOLUTION RESPIRATORY (INHALATION) at 11:43

## 2025-03-19 RX ADMIN — FLUOXETINE HYDROCHLORIDE 20 MG: 10 CAPSULE ORAL at 11:20

## 2025-03-19 RX ADMIN — Medication 1 CAPSULE: at 11:20

## 2025-03-19 RX ADMIN — AMPICILLIN SODIUM AND SULBACTAM SODIUM 3 G: 2; 1 INJECTION, POWDER, FOR SOLUTION INTRAMUSCULAR; INTRAVENOUS at 06:48

## 2025-03-19 RX ADMIN — GUAIFENESIN 1200 MG: 600 TABLET, MULTILAYER, EXTENDED RELEASE ORAL at 11:20

## 2025-03-19 RX ADMIN — GUAIFENESIN 1200 MG: 600 TABLET, MULTILAYER, EXTENDED RELEASE ORAL at 21:16

## 2025-03-19 RX ADMIN — POTASSIUM CHLORIDE 20 MEQ: 1500 TABLET, EXTENDED RELEASE ORAL at 17:20

## 2025-03-19 RX ADMIN — ARFORMOTEROL TARTRATE 15 MCG: 15 SOLUTION RESPIRATORY (INHALATION) at 19:27

## 2025-03-19 RX ADMIN — GABAPENTIN 300 MG: 300 CAPSULE ORAL at 11:19

## 2025-03-19 RX ADMIN — PANTOPRAZOLE SODIUM 40 MG: 40 TABLET, DELAYED RELEASE ORAL at 11:20

## 2025-03-19 RX ADMIN — FUROSEMIDE 20 MG: 40 TABLET ORAL at 11:20

## 2025-03-19 RX ADMIN — BUDESONIDE 0.5 MG: 0.5 INHALANT RESPIRATORY (INHALATION) at 19:27

## 2025-03-19 RX ADMIN — Medication 10 ML: at 21:16

## 2025-03-19 RX ADMIN — FERROUS SULFATE TAB 325 MG (65 MG ELEMENTAL FE) 325 MG: 325 (65 FE) TAB at 11:20

## 2025-03-19 RX ADMIN — AMPICILLIN SODIUM AND SULBACTAM SODIUM 3 G: 2; 1 INJECTION, POWDER, FOR SOLUTION INTRAMUSCULAR; INTRAVENOUS at 01:09

## 2025-03-19 NOTE — PLAN OF CARE
Goal Outcome Evaluation:  Plan of Care Reviewed With: patient           Outcome Evaluation: Pt is an 83 yo M admitted from LTC with generalized weakness and respiratory failure, on 2L O2 at time of eval. Pt reports independence at BL with a rwx, works with PT a few days/week. Pt presents to PT with impaired strength, endurance, and balance limiting overall mobility. Pt transferred to EOB with min A x1 and stood with mod A x1. Pt ambulated 12ft to bathroom with rwx and min A x1. Pt unsteady with forward flexed posture and fatigues quickly. Increased time sitting on commode but independent with hygiene. Pt required mod A x1 to stand from commode and ambulated 15ft back around bed to chair, left with needs met. PT will continue to follow, anticipate return to LTC.    Anticipated Discharge Disposition (PT): extended care facility

## 2025-03-19 NOTE — PLAN OF CARE
Goal Outcome Evaluation:  Plan of Care Reviewed With: patient           Outcome Evaluation: Afebrile. Denies pain or SOA.  02 2L NC (baseline). Loose cough of small amt white sputum. Speciment sent to lab. Telemetry SR.

## 2025-03-19 NOTE — H&P
Patient Name:  Tony Leonard  YOB: 1940  MRN:  [id number]  Admit Date:  3/18/2025  Patient Care Team:  Alfonso Goldstein MD as PCP - General (Family Medicine)  Katharina Salter MD as Consulting Physician (Pulmonary Disease)  Anum Bhatt MD as Consulting Physician (Pain Medicine)      Subjective   History Present Illness     Chief Complaint   Patient presents with    Weakness - Generalized    Cough         History of Present Illness  Mr. Leonard is a 84 y.o. with possible history of anemia, recent admission for influenza and superimposed pneumonia, COPD on 2 L at baseline, chronic cough presenting from rehab because of fever and generalized fatigue.  Patient reports that his fever was up to 101 at facility, no fever here.  Chronic cough is unchanged.  On his baseline oxygen.  Patient had an x-ray with left lower lobe infiltrate concerning for pneumonia so he was admitted for further evaluation.    Review of Systems   Constitutional:  Positive for fatigue. Negative for chills and fever.   Respiratory:  Positive for cough (chronic). Negative for shortness of breath.    Cardiovascular:  Negative for chest pain and palpitations.   Gastrointestinal:  Negative for abdominal pain, diarrhea, nausea and vomiting.        Personal History     Past Medical History:   Diagnosis Date    ADHD (attention deficit hyperactivity disorder)     Altered mental status, unspecified     Anemia, unspecified     Attention-deficit hyperactivity disorder, unspecified type     Benign prostatic hyperplasia without lower urinary tract symptoms     Body mass index 26.0-26.9, adult     Bronchiectasis     Bronchiectasis, uncomplicated     Chest pain, unspecified     CHF (congestive heart failure)     Chronic diastolic (congestive) heart failure     Chronic respiratory failure with hypoxia     Cognitive communication deficit     Colon polyp     COPD (chronic obstructive pulmonary disease)     Depression, unspecified      Diaphragmatic hernia without obstruction or gangrene     Diverticulosis     Diverticulosis of both small and large intestine without perforation or abscess without bleeding     Dorsalgia, unspecified     Encounter for immunization     Eosinophilic asthma     Essential (primary) hypertension     Gastroesophageal reflux disease without esophagitis     Hard of hearing     Hyperlipidemia, unspecified     Hypertensive heart disease with congestive heart failure     Hypoxemia     Influenza due to other identified influenza virus with other respiratory manifestations     Insomnia, unspecified     Localized edema     Mild protein-calorie malnutrition     Muscle weakness (generalized)     On home oxygen therapy     2 LITERS    Other specified abnormal findings of blood chemistry     Overweight     Paroxysmal atrial fibrillation     Personal history of malignant neoplasm of prostate     Pneumonia     Polyneuropathy, unspecified     Prostate cancer 04/22/2019    Restless leg syndrome     Sleep related hypoventilation in conditions classified elsewhere     Spinal stenosis, lumbar region with neurogenic claudication 08/02/2022    Spinal stenosis, lumbar region, with neurogenic claudication     Weakness      Past Surgical History:   Procedure Laterality Date    BRONCHOSCOPY N/A 04/30/2016    Procedure: BRONCHOSCOPY with BAL of right lower lobe and left  lower lobe.  ;  Surgeon: Nando Diaz MD;  Location: [facility] ENDOSCOPY;  Service:     BRONCHOSCOPY N/A 04/28/2017    Procedure: BRONCHOSCOPY;  Surgeon: Katharina Salter MD;  Location: [facility] ENDOSCOPY;  Service:     BRONCHOSCOPY Bilateral 06/29/2017    Procedure: BRONCHOSCOPY WITH WASHINGS;  Surgeon: Katharina Salter MD;  Location: [facility] ENDOSCOPY;  Service:     BRONCHOSCOPY N/A 01/22/2018    Procedure: BRONCHOSCOPY AT BEDSIDE with BAL;  Surgeon: Katharina Salter MD;  Location: [facility] ENDOSCOPY;  Service:     BRONCHOSCOPY N/A 01/30/2018    Procedure: BRONCHOSCOPY with BAL and  washing;  Surgeon: Shreyas Wild MD;  Location: [facility] ENDOSCOPY;  Service:     BRONCHOSCOPY Bilateral 04/24/2018    Procedure: BRONCHOSCOPY WITH WASHING;  Surgeon: Katharina Salter MD;  Location: [facility] ENDOSCOPY;  Service: Pulmonary    BRONCHOSCOPY N/A 12/28/2019    Procedure: BRONCHOSCOPY with bilateral washing;  Surgeon: Katharina Salter MD;  Location: [facility] ENDOSCOPY;  Service: Pulmonary    BRONCHOSCOPY Bilateral 01/04/2023    Procedure: BRONCHOSCOPY with lavage;  Surgeon: Katharina Salter MD;  Location: [facility] ENDOSCOPY;  Service: Pulmonary;  Laterality: Bilateral;  mucus plugging    CARDIAC CATHETERIZATION N/A 01/29/2023    Procedure: Left Heart Cath;  Surgeon: Johnnie Ang MD;  Location: [facility] CATH INVASIVE LOCATION;  Service: Cardiology;  Laterality: N/A;    CARDIAC CATHETERIZATION N/A 01/29/2023    Procedure: Coronary angiography;  Surgeon: Johnnie Ang MD;  Location: [facility] CATH INVASIVE LOCATION;  Service: Cardiology;  Laterality: N/A;    COLONOSCOPY  05/17/2013    eh, ih, tort, sig tics    COLONOSCOPY N/A 05/10/2019    Non-thrombosed external hemorrhoids found on perianal exam, Diverticulosis, Tortuous colon, One 5 mm polyp in the mid ascending colon, IH. Path: Tubular adenoma.     COLONOSCOPY N/A 09/24/2022    Procedure: COLONOSCOPY to cecum and TI with argon plasma coagulation.;  Surgeon: Bruce Black MD;  Location: [facility] ENDOSCOPY;  Service: Gastroenterology;  Laterality: N/A;  pre- GI bleeding  post- radiation proctitis, diverticulosis    ENDOSCOPY  03/19/2015    z line irreg, hh    ENDOSCOPY N/A 09/24/2022    Procedure: ESOPHAGOGASTRODUODENOSCOPY;  Surgeon: Bruce Black MD;  Location: [facility] ENDOSCOPY;  Service: Gastroenterology;  Laterality: N/A;  pre- GI bleeding  post- esophagitis, hiatal hernia    HIP OPEN REDUCTION Right 01/17/2018    Procedure: HIP OPEN REDUCTION INTERNAL FIXATION WITH DYNAMIC HIP SCREW;  Surgeon: Les Black MD;  Location: [facility] MAIN OR;   Service:     SPINE SURGERY      TONSILLECTOMY      TOTAL HIP ARTHROPLASTY REVISION Right 09/23/2019    Procedure: HIP  REVISION RIGHT;  Surgeon: Isauro Warner MD;  Location: [facility] OR;  Service: Orthopedics     Family History   Problem Relation Age of Onset    Thyroid disease Mother     No Known Problems Father     Malig Hyperthermia Neg Hx      Social History     Tobacco Use    Smoking status: Never    Smokeless tobacco: Never   Vaping Use    Vaping status: Never Used   Substance Use Topics    Alcohol use: No     Comment: red wine occassional    Drug use: No     No current facility-administered medications on file prior to encounter.     Current Outpatient Medications on File Prior to Encounter   Medication Sig Dispense Refill    acetaminophen (TYLENOL) 325 MG tablet Take 2 tablets by mouth Every 4 (Four) Hours As Needed for Mild Pain.      albuterol (PROVENTIL) (2.5 MG/3ML) 0.083% nebulizer solution Take 2.5 mg by nebulization Every 6 (Six) Hours As Needed for Wheezing. 360 mL 3    dimethicone (AVEENO) 1.3 % lotion lotion Apply 1 Application topically to the appropriate area as directed Every 12 (Twelve) Hours As Needed.      doxycycline (VIBRAMYICN) 100 MG tablet Take 1 tablet by mouth 2 (Two) Times a Day.      ferrous sulfate 325 (65 FE) MG tablet Take 1 tablet by mouth Daily With Breakfast.      FLUoxetine (PROzac) 20 MG capsule TAKE 1 CAPSULE DAILY 90 capsule 3    Fluticasone-Umeclidin-Vilant (Trelegy Ellipta) 100-62.5-25 MCG/ACT inhaler Inhale 1 puff Daily.      furosemide (LASIX) 20 MG tablet Take 1 tablet by mouth 2 (Two) Times a Day.      gabapentin (NEURONTIN) 300 MG capsule Take 1 capsule by mouth 3 (Three) Times a Day. 6 capsule 0    guaiFENesin (MUCINEX) 600 MG 12 hr tablet Take 2 tablets by mouth 2 (Two) Times a Day.      Heat Wraps (ThermaCare Back Pain Therapy) misc Use 1 Pad Daily.      lactobacillus (BACID) tablet caplet Take 1 tablet by mouth 2 (Two) Times a Day.      Melatonin 3 MG  capsule Take 1 capsule by mouth As Needed.      menthol-zinc oxide (CALMOSEPTINE) 0.44-20.625 % ointment ointment Apply 1 Application topically to the appropriate area as directed Every 12 (Twelve) Hours.      multivitamin with minerals (Multivitamin Adult) tablet tablet Take 1 tablet by mouth Daily. 30 tablet 11    neomycin-bacitracin-polymyxin (NEOSPORIN) 5-400-5000 ointment Apply 1 Application topically to the appropriate area as directed Daily. Right arm skin tear topically until healed      ondansetron ODT (ZOFRAN-ODT) 4 MG disintegrating tablet Place 1 tablet on the tongue Every 6 (Six) Hours As Needed for Nausea or Vomiting.      pantoprazole (PROTONIX) 40 MG EC tablet Take 1 tablet by mouth Daily. 90 tablet 3    potassium chloride (KLOR-CON) 20 MEQ packet Take 20 mEq by mouth Daily.      sodium chloride 7 % nebulizer solution nebulizer solution Take 4 mL by nebulization Every 4 (Four) Hours As Needed (pt takes as needed at home).      sucralfate (Carafate) 1 g tablet Take 1 tablet by mouth 4 (Four) Times a Day. 120 tablet 0    tuberculin (Aplisol) 5 UNIT/0.1ML injection Inject 0.1 mL into the appropriate area of the skin as directed by provider 1 (One) Time.      [DISCONTINUED] azithromycin (ZITHROMAX) 250 MG tablet Indications: Worsening of Chronic Obstructive Lung Disease 4 tablet 0    [DISCONTINUED] dicyclomine (BENTYL) 10 MG capsule Take 1 capsule by mouth 3 (Three) Times a Day As Needed (abdominal bloating). 30 capsule 0     Allergies   Allergen Reactions    Daliresp [Roflumilast] Anaphylaxis    Latex Rash    Sulfa Antibiotics Rash       Objective    Objective     Vital Signs  Temp:  [97.3 °F (36.3 °C)-98.5 °F (36.9 °C)] 98.3 °F (36.8 °C)  Heart Rate:  [] 86  Resp:  [18-22] 18  BP: ()/(62-86) 116/64  SpO2:  [91 %-100 %] 100 %  on  Flow (L/min) (Oxygen Therapy):  [2] 2;   Device (Oxygen Therapy): nasal cannula  Body mass index is 26.63 kg/m².    Physical Exam  Vitals and nursing note  reviewed.   Constitutional:       General: He is not in acute distress.     Comments: Elderly   Cardiovascular:      Rate and Rhythm: Normal rate and regular rhythm.   Pulmonary:      Effort: Pulmonary effort is normal. No respiratory distress.      Comments: Diminished breath sounds  Abdominal:      General: Abdomen is flat. There is no distension.      Tenderness: There is no abdominal tenderness.   Musculoskeletal:         General: No swelling or deformity.   Skin:     General: Skin is warm and dry.   Neurological:      General: No focal deficit present.      Mental Status: He is alert. Mental status is at baseline.         Results Review:  I reviewed the patient's new clinical results.  I reviewed the patient's new imaging results and agree with the interpretation.  I reviewed the patient's other test results and agree with the interpretation  I personally viewed and interpreted the patient's EKG/Telemetry data  Discussed with ED provider.    Lab Results (last 24 hours)       Procedure Component Value Units Date/Time    CBC & Differential [[id number]]  (Abnormal) Collected: 03/18/25 1839    Specimen: Blood from Arm, Left Updated: 03/18/25 1900    Narrative:      The following orders were created for panel order CBC & Differential.  Procedure                               Abnormality         Status                     ---------                               -----------         ------                     CBC Auto Differential[[id number]]        Abnormal            Final result                 Please view results for these tests on the individual orders.    Comprehensive Metabolic Panel [[id number]]  (Abnormal) Collected: 03/18/25 1839    Specimen: Blood from Arm, Left Updated: 03/18/25 1922     Glucose 109 mg/dL      BUN 19 mg/dL      Creatinine 1.12 mg/dL      Sodium 137 mmol/L      Potassium 4.1 mmol/L      Chloride 99 mmol/L      CO2 27.9 mmol/L      Calcium 9.1 mg/dL      Total Protein 6.2 g/dL      Albumin 3.0  "g/dL      ALT (SGPT) 19 U/L      AST (SGOT) 18 U/L      Alkaline Phosphatase 73 U/L      Total Bilirubin 0.6 mg/dL      Globulin 3.2 gm/dL      A/G Ratio 0.9 g/dL      BUN/Creatinine Ratio 17.0     Anion Gap 10.1 mmol/L      eGFR 64.0 mL/min/1.73     Narrative:      GFR Categories in Chronic Kidney Disease (CKD)      GFR Category          GFR (mL/min/1.73)    Interpretation  G1                     90 or greater         Normal or high (1)  G2                      60-89                Mild decrease (1)  G3a                   45-59                Mild to moderate decrease  G3b                   30-44                Moderate to severe decrease  G4                    15-29                Severe decrease  G5                    14 or less           Kidney failure          (1)In the absence of evidence of kidney disease, neither GFR category G1 or G2 fulfill the criteria for CKD.    eGFR calculation 2021 CKD-EPI creatinine equation, which does not include race as a factor    Procalcitonin [[id number]]  (Normal) Collected: 03/18/25 1839    Specimen: Blood from Arm, Left Updated: 03/18/25 1929     Procalcitonin 0.08 ng/mL     Narrative:      As a Marker for Sepsis (Non-Neonates):    1. <0.5 ng/mL represents a low risk of severe sepsis and/or septic shock.  2. >2 ng/mL represents a high risk of severe sepsis and/or septic shock.    As a Marker for Lower Respiratory Tract Infections that require antibiotic therapy:    PCT on Admission    Antibiotic Therapy       6-12 Hrs later    >0.5                Strongly Recommended  >0.25 - <0.5        Recommended   0.1 - 0.25          Discouraged              Remeasure/reassess PCT  <0.1                Strongly Discouraged     Remeasure/reassess PCT    As 28 day mortality risk marker: \"Change in Procalcitonin Result\" (>80% or <=80%) if Day 0 (or Day 1) and Day 4 values are available. Refer to http://www.[employer]-pct-calculator.com    Change in PCT <=80%  A decrease of PCT levels below or " equal to 80% defines a positive change in PCT test result representing a higher risk for 28-day all-cause mortality of patients diagnosed with severe sepsis for septic shock.    Change in PCT >80%  A decrease of PCT levels of more than 80% defines a negative change in PCT result representing a lower risk for 28-day all-cause mortality of patients diagnosed with severe sepsis or septic shock.       Blood Culture - Blood, Arm, Left [[id number]] Collected: 03/18/25 1839    Specimen: Blood from Arm, Left Updated: 03/18/25 1852    Lactic Acid, Plasma [[id number]]  (Normal) Collected: 03/18/25 1839    Specimen: Blood from Arm, Left Updated: 03/18/25 1919     Lactate 1.3 mmol/L     CBC Auto Differential [[id number]]  (Abnormal) Collected: 03/18/25 1839    Specimen: Blood from Arm, Left Updated: 03/18/25 1900     WBC 9.05 10*3/mm3      RBC 4.20 10*6/mm3      Hemoglobin 13.1 g/dL      Hematocrit 39.4 %      MCV 93.8 fL      MCH 31.2 pg      MCHC 33.2 g/dL      RDW 14.2 %      RDW-SD 46.9 fl      MPV 9.2 fL      Platelets 226 10*3/mm3      Neutrophil % 71.6 %      Lymphocyte % 16.5 %      Monocyte % 11.6 %      Eosinophil % 0.0 %      Basophil % 0.0 %      Immature Grans % 0.3 %      Neutrophils, Absolute 6.48 10*3/mm3      Lymphocytes, Absolute 1.49 10*3/mm3      Monocytes, Absolute 1.05 10*3/mm3      Eosinophils, Absolute 0.00 10*3/mm3      Basophils, Absolute 0.00 10*3/mm3      Immature Grans, Absolute 0.03 10*3/mm3      nRBC 0.0 /100 WBC     Blood Culture - Blood, Arm, Right [[id number]] Collected: 03/18/25 1847    Specimen: Blood from Arm, Right Updated: 03/18/25 1852    Legionella Antigen, Urine - Urine, Urinary Bladder [[id number]]  (Normal) Collected: 03/19/25 0040    Specimen: Urine from Urinary Bladder Updated: 03/19/25 0703     LEGIONELLA ANTIGEN, URINE Negative    S. Pneumo Ag Urine or CSF - Urine, Urinary Bladder [[id number]]  (Normal) Collected: 03/19/25 0040    Specimen: Urine from Urinary Bladder Updated: 03/19/25  0703     Strep Pneumo Ag Negative    CBC (No Diff) [[id number]]  (Abnormal) Collected: 03/19/25 0539    Specimen: Blood Updated: 03/19/25 0615     WBC 6.57 10*3/mm3      RBC 3.67 10*6/mm3      Hemoglobin 11.4 g/dL      Hematocrit 34.3 %      MCV 93.5 fL      MCH 31.1 pg      MCHC 33.2 g/dL      RDW 13.9 %      RDW-SD 45.7 fl      MPV 9.5 fL      Platelets 205 10*3/mm3     Comprehensive Metabolic Panel [[id number]]  (Abnormal) Collected: 03/19/25 0539    Specimen: Blood Updated: 03/19/25 0639     Glucose 132 mg/dL      BUN 20 mg/dL      Creatinine 1.06 mg/dL      Sodium 140 mmol/L      Potassium 3.7 mmol/L      Chloride 105 mmol/L      CO2 27.0 mmol/L      Calcium 8.4 mg/dL      Total Protein 5.0 g/dL      Albumin 2.9 g/dL      ALT (SGPT) 17 U/L      AST (SGOT) 16 U/L      Alkaline Phosphatase 67 U/L      Total Bilirubin 0.4 mg/dL      Globulin 2.1 gm/dL      A/G Ratio 1.4 g/dL      BUN/Creatinine Ratio 18.9     Anion Gap 8.0 mmol/L      eGFR 68.3 mL/min/1.73     Narrative:      GFR Categories in Chronic Kidney Disease (CKD)      GFR Category          GFR (mL/min/1.73)    Interpretation  G1                     90 or greater         Normal or high (1)  G2                      60-89                Mild decrease (1)  G3a                   45-59                Mild to moderate decrease  G3b                   30-44                Moderate to severe decrease  G4                    15-29                Severe decrease  G5                    14 or less           Kidney failure          (1)In the absence of evidence of kidney disease, neither GFR category G1 or G2 fulfill the criteria for CKD.    eGFR calculation 2021 CKD-EPI creatinine equation, which does not include race as a factor    Respiratory Panel PCR w/COVID-19(SARS-CoV-2) JARRETT/AMPARO/ROYAL/PAD/COR/JERMAIN In-House, NP Swab in [facility], 2 HR TAT - Swab, Nasopharynx [[id number]]  (Normal) Collected: 03/19/25 1119    Specimen: Swab from Nasopharynx Updated: 03/19/25 1235      ADENOVIRUS, PCR Not Detected     Coronavirus 229E Not Detected     Coronavirus HKU1 Not Detected     Coronavirus NL63 Not Detected     Coronavirus OC43 Not Detected     COVID19 Not Detected     Human Metapneumovirus Not Detected     Human Rhinovirus/Enterovirus Not Detected     Influenza A PCR Not Detected     Influenza B PCR Not Detected     Parainfluenza Virus 1 Not Detected     Parainfluenza Virus 2 Not Detected     Parainfluenza Virus 3 Not Detected     Parainfluenza Virus 4 Not Detected     RSV, PCR Not Detected     Bordetella pertussis pcr Not Detected     Bordetella parapertussis PCR Not Detected     Chlamydophila pneumoniae PCR Not Detected     Mycoplasma pneumo by PCR Not Detected    Narrative:      In the setting of a positive respiratory panel with a viral infection PLUS a negative procalcitonin without other underlying concern for bacterial infection, consider observing off antibiotics or discontinuation of antibiotics and continue supportive care. If the respiratory panel is positive for atypical bacterial infection (Bordetella pertussis, Chlamydophila pneumoniae, or Mycoplasma pneumoniae), consider antibiotic de-escalation to target atypical bacterial infection.            Imaging Results (Last 24 Hours)       Procedure Component Value Units Date/Time    XR Chest 1 View [[id number]] Collected: 03/18/25 1847     Updated: 03/18/25 1852    Narrative:      XR CHEST 1 VW-     HISTORY: Male who is 84 years-old, suspected pneumonia     TECHNIQUE: Frontal view of the chest     COMPARISON: 2/26/2025     FINDINGS: The heart size is normal. Aorta is calcified. Pulmonary  vasculature is unremarkable. Lung volume is low. Infiltrate the left  base suggest pneumonia, follow-up recommended to characterize resolution  and to exclude any possibility of an underlying lesion. There could be  small left pleural effusion. No pneumothorax. No acute osseous process.       Impression:      Left basilar infiltrate, follow-up  recommended.     This report was finalized on 3/18/2025 6:49 PM by Dr. Tj Evans M.D on Workstation: [id number]               Results for orders placed during the hospital encounter of 12/21/23    Adult Transthoracic Echo Complete W/ Cont if Necessary Per Protocol    Interpretation Summary    Left ventricular ejection fraction appears to be 56 - 60%.    Left ventricular diastolic function was normal.    Moderate dilation of the aortic root is present. Mild dilation of the sinuses of Valsalva is present.      No orders to display        Assessment/Plan     Active Hospital Problems    Diagnosis  POA    **Pneumonia [J18.9]  Yes    Anemia [D64.9]  Yes    Chronic respiratory failure with hypoxia [J96.11]  Yes    Chronic diastolic CHF (congestive heart failure) [I50.32]  Yes    Acute on chronic respiratory failure with hypoxia [J96.21]  Yes    COPD (chronic obstructive pulmonary disease) [J44.9]  Yes      Resolved Hospital Problems   No resolved problems to display.     Abnormal chest imaging  Recurrent pneumonias with recent influenza infection  Concern for dysphagia  Chronic cough  Chronic respiratory failure on 2 L  -Reviewed chest x-ray and previous CT scan.  Suspect this infiltrates from his previous hospitalization for pneumonia.    -Monitor fever curve, no fever here.  -Pulmonology consulted, agree with speech evaluation  -Agree to hold off on further antibiotics, they were discontinued this morning  -RVP negative    COPD, not in exacerbation-continue formulary substituted nebulizer    Neuropathy-decreased dose of gabapentin per pulm recommendations    GERD-continue home PPI    Iron deficiency anemia-continue home ferrous sulfate    Goals of care-discussed CODE STATUS with patient in detail and he is adamant that he is full code.      I discussed the patient's findings and my recommendations with patient and nursing staff.    VTE Prophylaxis - SCDs.  Code Status - Full code.       Lance Lawson,  MD [facility]  03/19/25  15:37 EDT

## 2025-03-19 NOTE — CONSULTS
Group: [facility]         CONSULT NOTE    Patient Identification:  Tony Leonard  84 y.o.  male  1940  [id number]            Requesting physician: Dr. Lance Lawson    Reason for Consultation: Abnormal chest x-ray    CC: Cough    History of Present Illness:  84-year-old male patient presents with cough.  Cough has been present for more than 3 weeks.  He actually has chronic cough and has had that for many years but recently worsened due to influenza infection 3 weeks ago.  His cough is productive of scant brown sputum. Accompanied by fever and shortness of breath with any minimal exertion.  He is debilitated, comes from a nursing facility.  Is requiring some oxygen.  He is a patient of Dr. Salter from our pulmonary office.  I reviewed his records.  He has had a history of recurrent pneumonia multiple times over the past 2 years.      Review of Systems   Constitutional:  Negative for diaphoresis and fever.   HENT:  Negative for ear pain and sore throat.    Eyes:  Negative for pain and visual disturbance.   Respiratory:  Positive for cough and shortness of breath.    Gastrointestinal:  Negative for abdominal pain and diarrhea.   Endocrine: Negative for cold intolerance and polyuria.   Genitourinary:  Negative for dysuria and hematuria.   Musculoskeletal:  Negative for joint swelling and myalgias.   Skin:  Negative for rash and wound.   Neurological:  Negative for speech difficulty and numbness.   Hematological:  Negative for adenopathy. Does not bruise/bleed easily.   Psychiatric/Behavioral:  Negative for agitation and confusion.        Past Medical History:  Past Medical History:   Diagnosis Date    ADHD (attention deficit hyperactivity disorder)     Altered mental status, unspecified     Anemia, unspecified     Attention-deficit hyperactivity disorder, unspecified type     Benign prostatic hyperplasia without lower urinary tract symptoms     Body mass index 26.0-26.9, adult     Bronchiectasis      Bronchiectasis, uncomplicated     Chest pain, unspecified     CHF (congestive heart failure)     Chronic diastolic (congestive) heart failure     Chronic respiratory failure with hypoxia     Cognitive communication deficit     Colon polyp     COPD (chronic obstructive pulmonary disease)     Depression, unspecified     Diaphragmatic hernia without obstruction or gangrene     Diverticulosis     Diverticulosis of both small and large intestine without perforation or abscess without bleeding     Dorsalgia, unspecified     Encounter for immunization     Eosinophilic asthma     Essential (primary) hypertension     Gastroesophageal reflux disease without esophagitis     Hard of hearing     Hyperlipidemia, unspecified     Hypertensive heart disease with congestive heart failure     Hypoxemia     Influenza due to other identified influenza virus with other respiratory manifestations     Insomnia, unspecified     Localized edema     Mild protein-calorie malnutrition     Muscle weakness (generalized)     On home oxygen therapy     2 LITERS    Other specified abnormal findings of blood chemistry     Overweight     Paroxysmal atrial fibrillation     Personal history of malignant neoplasm of prostate     Pneumonia     Polyneuropathy, unspecified     Prostate cancer 04/22/2019    Restless leg syndrome     Sleep related hypoventilation in conditions classified elsewhere     Spinal stenosis, lumbar region with neurogenic claudication 08/02/2022    Spinal stenosis, lumbar region, with neurogenic claudication     Weakness        Past Surgical History:  Past Surgical History:   Procedure Laterality Date    BRONCHOSCOPY N/A 04/30/2016    Procedure: BRONCHOSCOPY with BAL of right lower lobe and left  lower lobe.  ;  Surgeon: Nando Diaz MD;  Location: [facility] ENDOSCOPY;  Service:     BRONCHOSCOPY N/A 04/28/2017    Procedure: BRONCHOSCOPY;  Surgeon: Katharina Salter MD;  Location: [facility] ENDOSCOPY;  Service:     BRONCHOSCOPY  Bilateral 06/29/2017    Procedure: BRONCHOSCOPY WITH WASHINGS;  Surgeon: Katharina Salter MD;  Location: [facility] ENDOSCOPY;  Service:     BRONCHOSCOPY N/A 01/22/2018    Procedure: BRONCHOSCOPY AT BEDSIDE with BAL;  Surgeon: Katharina Salter MD;  Location: [facility] ENDOSCOPY;  Service:     BRONCHOSCOPY N/A 01/30/2018    Procedure: BRONCHOSCOPY with BAL and washing;  Surgeon: Shreyas Wild MD;  Location: [facility] ENDOSCOPY;  Service:     BRONCHOSCOPY Bilateral 04/24/2018    Procedure: BRONCHOSCOPY WITH WASHING;  Surgeon: Katharina Salter MD;  Location: [facility] ENDOSCOPY;  Service: Pulmonary    BRONCHOSCOPY N/A 12/28/2019    Procedure: BRONCHOSCOPY with bilateral washing;  Surgeon: Katharina Salter MD;  Location: [facility] ENDOSCOPY;  Service: Pulmonary    BRONCHOSCOPY Bilateral 01/04/2023    Procedure: BRONCHOSCOPY with lavage;  Surgeon: Katharina Salter MD;  Location: [facility] ENDOSCOPY;  Service: Pulmonary;  Laterality: Bilateral;  mucus plugging    CARDIAC CATHETERIZATION N/A 01/29/2023    Procedure: Left Heart Cath;  Surgeon: Johnnie Ang MD;  Location: [facility] CATH INVASIVE LOCATION;  Service: Cardiology;  Laterality: N/A;    CARDIAC CATHETERIZATION N/A 01/29/2023    Procedure: Coronary angiography;  Surgeon: Johnnie Ang MD;  Location: [facility] CATH INVASIVE LOCATION;  Service: Cardiology;  Laterality: N/A;    COLONOSCOPY  05/17/2013    eh, ih, tort, sig tics    COLONOSCOPY N/A 05/10/2019    Non-thrombosed external hemorrhoids found on perianal exam, Diverticulosis, Tortuous colon, One 5 mm polyp in the mid ascending colon, IH. Path: Tubular adenoma.     COLONOSCOPY N/A 09/24/2022    Procedure: COLONOSCOPY to cecum and TI with argon plasma coagulation.;  Surgeon: Bruce Black MD;  Location: [facility] ENDOSCOPY;  Service: Gastroenterology;  Laterality: N/A;  pre- GI bleeding  post- radiation proctitis, diverticulosis    ENDOSCOPY  03/19/2015    z line irreg, hh    ENDOSCOPY N/A 09/24/2022    Procedure:  ESOPHAGOGASTRODUODENOSCOPY;  Surgeon: Bruce Black MD;  Location: [facility] ENDOSCOPY;  Service: Gastroenterology;  Laterality: N/A;  pre- GI bleeding  post- esophagitis, hiatal hernia    HIP OPEN REDUCTION Right 01/17/2018    Procedure: HIP OPEN REDUCTION INTERNAL FIXATION WITH DYNAMIC HIP SCREW;  Surgeon: Les Black MD;  Location: [facility] MAIN OR;  Service:     SPINE SURGERY      TONSILLECTOMY      TOTAL HIP ARTHROPLASTY REVISION Right 09/23/2019    Procedure: HIP  REVISION RIGHT;  Surgeon: Isauro Warner MD;  Location: [facility] OR;  Service: Orthopedics        Home Meds:  Medications Prior to Admission   Medication Sig Dispense Refill Last Dose/Taking    acetaminophen (TYLENOL) 325 MG tablet Take 2 tablets by mouth Every 4 (Four) Hours As Needed for Mild Pain.   Taking As Needed    albuterol (PROVENTIL) (2.5 MG/3ML) 0.083% nebulizer solution Take 2.5 mg by nebulization Every 6 (Six) Hours As Needed for Wheezing. 360 mL 3 Taking As Needed    dimethicone (AVEENO) 1.3 % lotion lotion Apply 1 Application topically to the appropriate area as directed Every 12 (Twelve) Hours As Needed.   Taking As Needed    doxycycline (VIBRAMYICN) 100 MG tablet Take 1 tablet by mouth 2 (Two) Times a Day.   Taking    ferrous sulfate 325 (65 FE) MG tablet Take 1 tablet by mouth Daily With Breakfast.   Taking    FLUoxetine (PROzac) 20 MG capsule TAKE 1 CAPSULE DAILY 90 capsule 3 Taking    Fluticasone-Umeclidin-Vilant (Trelegy Ellipta) 100-62.5-25 MCG/ACT inhaler Inhale 1 puff Daily.   Taking    furosemide (LASIX) 20 MG tablet Take 1 tablet by mouth 2 (Two) Times a Day.   Taking    gabapentin (NEURONTIN) 300 MG capsule Take 1 capsule by mouth 3 (Three) Times a Day. 6 capsule 0 Taking    guaiFENesin (MUCINEX) 600 MG 12 hr tablet Take 2 tablets by mouth 2 (Two) Times a Day.   Taking    Heat Wraps (ThermaCare Back Pain Therapy) misc Use 1 Pad Daily.   Taking    lactobacillus (BACID) tablet caplet Take 1 tablet by mouth 2 (Two)  Times a Day.   Taking    Melatonin 3 MG capsule Take 1 capsule by mouth As Needed.   Taking As Needed    menthol-zinc oxide (CALMOSEPTINE) 0.44-20.625 % ointment ointment Apply 1 Application topically to the appropriate area as directed Every 12 (Twelve) Hours.   Taking    multivitamin with minerals (Multivitamin Adult) tablet tablet Take 1 tablet by mouth Daily. 30 tablet 11 Taking    neomycin-bacitracin-polymyxin (NEOSPORIN) 5-400-5000 ointment Apply 1 Application topically to the appropriate area as directed Daily. Right arm skin tear topically until healed   Taking    ondansetron ODT (ZOFRAN-ODT) 4 MG disintegrating tablet Place 1 tablet on the tongue Every 6 (Six) Hours As Needed for Nausea or Vomiting.   Taking As Needed    pantoprazole (PROTONIX) 40 MG EC tablet Take 1 tablet by mouth Daily. 90 tablet 3 Taking    potassium chloride (KLOR-CON) 20 MEQ packet Take 20 mEq by mouth Daily.   Taking    sodium chloride 7 % nebulizer solution nebulizer solution Take 4 mL by nebulization Every 4 (Four) Hours As Needed (pt takes as needed at home).   Taking As Needed    sucralfate (Carafate) 1 g tablet Take 1 tablet by mouth 4 (Four) Times a Day. 120 tablet 0 Taking    tuberculin (Aplisol) 5 UNIT/0.1ML injection Inject 0.1 mL into the appropriate area of the skin as directed by provider 1 (One) Time.          Allergies:  Allergies   Allergen Reactions    Daliresp [Roflumilast] Anaphylaxis    Latex Rash    Sulfa Antibiotics Rash       Social History:   Social History     Socioeconomic History    Marital status: Single   Tobacco Use    Smoking status: Never    Smokeless tobacco: Never   Vaping Use    Vaping status: Never Used   Substance and Sexual Activity    Alcohol use: No     Comment: red wine occassional    Drug use: No    Sexual activity: Defer       Family History:  Family History   Problem Relation Age of Onset    Thyroid disease Mother     No Known Problems Father     Malig Hyperthermia Neg Hx        Physical  "Exam:  /64 (BP Location: Right arm, Patient Position: Lying)   Pulse 86   Temp 98.3 °F (36.8 °C) (Oral)   Resp 18   Ht 180.3 cm (71\")   Wt 86.6 kg (190 lb 14.7 oz)   SpO2 100%   BMI 26.63 kg/m²  Body mass index is 26.63 kg/m². 100% 86.6 kg (190 lb 14.7 oz)  Physical Exam  Constitutional:       General: He is not in acute distress.     Appearance: He is well-developed.   HENT:      Right Ear: External ear normal.      Left Ear: External ear normal.      Nose: Nose normal.   Eyes:      General: No scleral icterus.     Conjunctiva/sclera: Conjunctivae normal.      Pupils: Pupils are equal, round, and reactive to light.   Neck:      Thyroid: No thyromegaly.      Vascular: No JVD.   Cardiovascular:      Rate and Rhythm: Normal rate and regular rhythm.      Heart sounds: No murmur heard.     Comments: No edema  Pulmonary:      Effort: Pulmonary effort is normal.      Breath sounds: No wheezing or rales.      Comments: Distant and diminished breath sounds over the bases.  Some audible gurgling pharyngeal secretions but no actual rhonchi down over the lung fields  Abdominal:      General: There is no distension.      Palpations: Abdomen is soft.      Comments: No liver or spleen enlargment palpable   Musculoskeletal:         General: No deformity. Normal range of motion.      Cervical back: Neck supple. No rigidity.      Comments: No deformity in all 4 extrem   Skin:     Findings: No erythema or rash.      Comments: No palpable nodules   Neurological:      General: No focal deficit present.      Mental Status: He is alert and oriented to person, place, and time.      Cranial Nerves: No cranial nerve deficit.      Sensory: No sensory deficit.   Psychiatric:         Mood and Affect: Mood normal.         Thought Content: Thought content normal.         LABS:  COVID19   Date Value Ref Range Status   03/19/2025 Not Detected Not Detected - Ref. Range Final       Lab Results   Component Value Date    CALCIUM 8.4 (L) " 03/19/2025    PHOS 2.7 02/18/2025     Results from last 7 days   Lab Units 03/19/25  0539 03/18/25  1839   SODIUM mmol/L 140 137   POTASSIUM mmol/L 3.7 4.1   CHLORIDE mmol/L 105 99   CO2 mmol/L 27.0 27.9   BUN mg/dL 20 19   CREATININE mg/dL 1.06 1.12   GLUCOSE mg/dL 132* 109*   CALCIUM mg/dL 8.4* 9.1   WBC 10*3/mm3 6.57 9.05   HEMOGLOBIN g/dL 11.4* 13.1   PLATELETS 10*3/mm3 205 226   ALT (SGPT) U/L 17 19   AST (SGOT) U/L 16 18   PROCALCITONIN ng/mL  --  0.08     Lab Results   Component Value Date    CKTOTAL 42 04/19/2024    TROPONINT 32 (H) 02/26/2025             Results from last 7 days   Lab Units 03/18/25  1839   PROCALCITONIN ng/mL 0.08   LACTATE mmol/L 1.3         Results from last 7 days   Lab Units 03/19/25  1119   ADENOVIRUS DETECTION BY PCR  Not Detected   CORONAVIRUS 229E  Not Detected   CORONAVIRUS HKU1  Not Detected   CORONAVIRUS NL63  Not Detected   CORONAVIRUS OC43  Not Detected   HUMAN METAPNEUMOVIRUS  Not Detected   HUMAN RHINOVIRUS/ENTEROVIRUS  Not Detected   INFLUENZA B PCR  Not Detected   PARAINFLUENZA 1  Not Detected   PARAINFLUENZA VIRUS 2  Not Detected   PARAINFLUENZA VIRUS 3  Not Detected   PARAINFLUENZA VIRUS 4  Not Detected   BORDETELLA PERTUSSIS PCR  Not Detected   BORDETELLA PARAPERTUSSIS PCR  Not Detected   CHLAMYDOPHILA PNEUMONIAE PCR  Not Detected   MYCOPLAMA PNEUMO PCR  Not Detected   RSV, PCR  Not Detected             Lab Results   Component Value Date    TSH 3.310 10/17/2023     Estimated Creatinine Clearance: 61.3 mL/min (by C-G formula based on SCr of 1.06 mg/dL).         Imaging: I personally visualized the images of several chest x-rays and CT scans of the chest done within the past 9 months.  The patient has atelectasis at the lower lobes, mostly on the left.  This seems to be residual, chronic from recent pneumonia.  He also had recent influenza infection with pneumonia 3 weeks ago    Assessment:  Abnormal chest imaging  Atelectasis left lower lobe  Previous recurrent  pneumonia  Probable dysphagia  Recent influenza infection        Recommendations:  At this point, he needs swallow evaluation.  Is a very wet vocal quality.  He has some audible gurgling secretions in his throat at bedside exam but his lower lung fields sound diminished but no significant loose secretions heard in his lung fields.  I would certainly reduce some of his medications which can cause reflux/slow gastric emptying, or difficulty swallowing such as his Neurontin.  CT chest images as well as chest x-rays reviewed over the past 9 months showing recurrent pneumonia.  For this reason he needs to be tested for aspiration, perhaps even barium esophagram might be needed.  The patient had recent influenza infection 3 weeks ago.  To me the images on CT chest show residual atelectasis but no acute new airspace infiltrate in the lower lobes.  His procalcitonin was normal.  There is no leukocytosis.  I recommend holding additional antibiotics.        Peryc Ingram MD  3/19/2025  14:59 EDT      Much of this encounter note is an electronic transcription/translation of spoken language to printed text using Dragon Software.

## 2025-03-19 NOTE — THERAPY EVALUATION
Patient Name: Tony Leonard  : 1940    MRN: [id number]                              Today's Date: 3/19/2025       Admit Date: 3/18/2025    Visit Dx:     ICD-10-CM ICD-9-CM   1. Pneumonia of left lower lobe due to infectious organism  J18.9 486     Patient Active Problem List   Diagnosis    Thrush, oral    ADD (attention deficit disorder)    Hemorrhoids    Bronchiectasis with acute lower respiratory infection    Diverticul disease small and large intestine, no perforati or abscess    Benign non-nodular prostatic hyperplasia without lower urinary tract symptoms    Gastroesophageal reflux disease    Malaise and fatigue    GERD (gastroesophageal reflux disease)    Environmental allergies    Hemoptysis    Dyslipidemia    Primary osteoarthritis involving multiple joints    Pneumonia of right lower lobe due to infectious organism    Abnormal EKG    COPD (chronic obstructive pulmonary disease)    Mild malnutrition    Acute on chronic respiratory failure with hypoxia    Fracture, intertrochanteric, right femur, closed, initial encounter    Chronic diastolic CHF (congestive heart failure)    Hematoma of frontal scalp    Postoperative anemia due to acute blood loss    Urinary retention    Hemorrhagic disorder due to circulating anticoagulants    History of fracture of right hip    Closed fracture of right hip with routine healing    Chronic low back pain with sciatica    Change in bowel function    Rectal bleeding    Prostate cancer    On home oxygen therapy    Acute viral bronchitis    Neuropathy    Plantar fasciitis    HTN (hypertension)    Bilateral lower extremity edema    Microscopic hematuria    Acute midline low back pain with right-sided sciatica    Spinal stenosis, lumbar region with neurogenic claudication    Compression deformity of vertebra    Rectal bleed    Esophagitis    Angiectasia    Hiatal hernia    Overweight (BMI 25.0-29.9)    Leukocytosis    Bilateral hip pain    Elevated troponin level not due  myocardial infarction    Nocturnal hypoxia    Pneumonia of right upper lobe due to infectious organism    NSTEMI (non-ST elevated myocardial infarction)    Chronic respiratory failure with hypoxia    Paroxysmal atrial fibrillation    Acute exacerbation of chronic obstructive pulmonary disease (COPD)    Anemia    Non-compliant patient    Hypokalemia    Spondylolisthesis of lumbar region    Elevated troponin    Rhabdomyolysis    Multiple contusions    Fall    Dizziness    Contusion of hip    Pulmonary embolism    COPD with acute exacerbation    Influenza A    Altered mental status    Pneumonia    Pneumonia due to E. coli     Past Medical History:   Diagnosis Date    ADHD (attention deficit hyperactivity disorder)     Altered mental status, unspecified     Anemia, unspecified     Attention-deficit hyperactivity disorder, unspecified type     Benign prostatic hyperplasia without lower urinary tract symptoms     Body mass index 26.0-26.9, adult     Bronchiectasis     Bronchiectasis, uncomplicated     Chest pain, unspecified     CHF (congestive heart failure)     Chronic diastolic (congestive) heart failure     Chronic respiratory failure with hypoxia     Cognitive communication deficit     Colon polyp     COPD (chronic obstructive pulmonary disease)     Depression, unspecified     Diaphragmatic hernia without obstruction or gangrene     Diverticulosis     Diverticulosis of both small and large intestine without perforation or abscess without bleeding     Dorsalgia, unspecified     Encounter for immunization     Eosinophilic asthma     Essential (primary) hypertension     Gastroesophageal reflux disease without esophagitis     Hard of hearing     Hyperlipidemia, unspecified     Hypertensive heart disease with congestive heart failure     Hypoxemia     Influenza due to other identified influenza virus with other respiratory manifestations     Insomnia, unspecified     Localized edema     Mild protein-calorie malnutrition      Muscle weakness (generalized)     On home oxygen therapy     2 LITERS    Other specified abnormal findings of blood chemistry     Overweight     Paroxysmal atrial fibrillation     Personal history of malignant neoplasm of prostate     Pneumonia     Polyneuropathy, unspecified     Prostate cancer 04/22/2019    Restless leg syndrome     Sleep related hypoventilation in conditions classified elsewhere     Spinal stenosis, lumbar region with neurogenic claudication 08/02/2022    Spinal stenosis, lumbar region, with neurogenic claudication     Weakness      Past Surgical History:   Procedure Laterality Date    BRONCHOSCOPY N/A 04/30/2016    Procedure: BRONCHOSCOPY with BAL of right lower lobe and left  lower lobe.  ;  Surgeon: Nando Diaz MD;  Location: [facility] ENDOSCOPY;  Service:     BRONCHOSCOPY N/A 04/28/2017    Procedure: BRONCHOSCOPY;  Surgeon: Katharina Salter MD;  Location: [facility] ENDOSCOPY;  Service:     BRONCHOSCOPY Bilateral 06/29/2017    Procedure: BRONCHOSCOPY WITH WASHINGS;  Surgeon: Katharina Salter MD;  Location: [facility] ENDOSCOPY;  Service:     BRONCHOSCOPY N/A 01/22/2018    Procedure: BRONCHOSCOPY AT BEDSIDE with BAL;  Surgeon: Katharina Salter MD;  Location: [facility] ENDOSCOPY;  Service:     BRONCHOSCOPY N/A 01/30/2018    Procedure: BRONCHOSCOPY with BAL and washing;  Surgeon: Shreyas Wild MD;  Location: [facility] ENDOSCOPY;  Service:     BRONCHOSCOPY Bilateral 04/24/2018    Procedure: BRONCHOSCOPY WITH WASHING;  Surgeon: Katharina Salter MD;  Location: [facility] ENDOSCOPY;  Service: Pulmonary    BRONCHOSCOPY N/A 12/28/2019    Procedure: BRONCHOSCOPY with bilateral washing;  Surgeon: Katharina Salter MD;  Location: [facility] ENDOSCOPY;  Service: Pulmonary    BRONCHOSCOPY Bilateral 01/04/2023    Procedure: BRONCHOSCOPY with lavage;  Surgeon: Katharina Salter MD;  Location: [facility] ENDOSCOPY;  Service: Pulmonary;  Laterality: Bilateral;  mucus plugging    CARDIAC CATHETERIZATION N/A 01/29/2023    Procedure: Left Heart  Cath;  Surgeon: Johnnie Ang MD;  Location: [facility] CATH INVASIVE LOCATION;  Service: Cardiology;  Laterality: N/A;    CARDIAC CATHETERIZATION N/A 01/29/2023    Procedure: Coronary angiography;  Surgeon: Johnnie Ang MD;  Location: [facility] CATH INVASIVE LOCATION;  Service: Cardiology;  Laterality: N/A;    COLONOSCOPY  05/17/2013    eh, ih, tort, sig tics    COLONOSCOPY N/A 05/10/2019    Non-thrombosed external hemorrhoids found on perianal exam, Diverticulosis, Tortuous colon, One 5 mm polyp in the mid ascending colon, IH. Path: Tubular adenoma.     COLONOSCOPY N/A 09/24/2022    Procedure: COLONOSCOPY to cecum and TI with argon plasma coagulation.;  Surgeon: Bruce Black MD;  Location: [facility] ENDOSCOPY;  Service: Gastroenterology;  Laterality: N/A;  pre- GI bleeding  post- radiation proctitis, diverticulosis    ENDOSCOPY  03/19/2015    z line irreg, hh    ENDOSCOPY N/A 09/24/2022    Procedure: ESOPHAGOGASTRODUODENOSCOPY;  Surgeon: Bruce Black MD;  Location: [facility] ENDOSCOPY;  Service: Gastroenterology;  Laterality: N/A;  pre- GI bleeding  post- esophagitis, hiatal hernia    HIP OPEN REDUCTION Right 01/17/2018    Procedure: HIP OPEN REDUCTION INTERNAL FIXATION WITH DYNAMIC HIP SCREW;  Surgeon: Les Black MD;  Location: [facility] MAIN OR;  Service:     SPINE SURGERY      TONSILLECTOMY      TOTAL HIP ARTHROPLASTY REVISION Right 09/23/2019    Procedure: HIP  REVISION RIGHT;  Surgeon: Isauro Warner MD;  Location: [facility] MAIN OR;  Service: Orthopedics      General Information       Row Name 03/19/25 1538          Physical Therapy Time and Intention    Document Type evaluation  -     Mode of Treatment individual therapy;physical therapy  -       Row Name 03/19/25 1538          General Information    Patient Profile Reviewed yes  -     Prior Level of Function gait;transfer;bed mobility;min assist:  -     Existing Precautions/Restrictions fall  -     Barriers to Rehab medically  complex;previous functional deficit  -       Row Name 03/19/25 1538          Living Environment    Current Living Arrangements extended care facility  -     People in Home facility resident  -[facility] Name 03/19/25 1538          Home Main Entrance    Number of Stairs, Main Entrance none  -[facility] Name 03/19/25 1538          Stairs Within Home, Primary    Number of Stairs, Within Home, Primary none  -[facility] Name 03/19/25 1538          Cognition    Orientation Status (Cognition) oriented x 3  -[facility] Name 03/19/25 1538          Safety Issues/Impairments Affecting Functional Mobility    Impairments Affecting Function (Mobility) balance;endurance/activity tolerance;strength;pain  -               User Key  (r) = Recorded By, (t) = Taken By, (c) = Cosigned By      Initials Name Provider Type     Diana Morales PT Physical Therapist                   Mobility       [facility] Name 03/19/25 1539          Bed Mobility    Bed Mobility supine-sit  -     Supine-Sit [place] (Bed Mobility) minimum assist (75% patient effort);1 person assist;verbal cues  -     Assistive Device (Bed Mobility) head of bed elevated  -[facility] Name 03/19/25 1539          Sit-Stand Transfer    Sit-Stand [place] (Transfers) moderate assist (50% patient effort);1 person assist;verbal cues  -     Assistive Device (Sit-Stand Transfers) walker, front-wheeled  -[facility] Name 03/19/25 1539          Gait/Stairs (Locomotion)    [place] Level (Gait) verbal cues;minimum assist (75% patient effort);1 person assist  -     Assistive Device (Gait) walker, front-wheeled  -     Distance in Feet (Gait) 15  +12ft  -     Deviations/Abnormal Patterns (Gait) antalgic;norm decreased;gait speed decreased;stride length decreased  -     Bilateral Gait Deviations forward flexed posture  -               User Key  (r) = Recorded By, (t) = Taken By, (c) = Cosigned By      Initials Name Provider Type    QUIANA Morales  Diana MOORE PT Physical Therapist                   Obj/Interventions       [facility] Name 03/19/25 1539          Range of Motion Comprehensive    General Range of Motion bilateral lower extremity ROM WFL  -[facility] Name 03/19/25 1539          Strength Comprehensive (MMT)    General Manual Muscle Testing (MMT) Assessment lower extremity strength deficits identified  -     Comment, General Manual Muscle Testing (MMT) Assessment Generalized weakness, BLE grossly 4/5  -       Row Name 03/19/25 1539          Balance    Balance Assessment sitting static balance;sitting dynamic balance;standing dynamic balance;standing static balance  -     Static Sitting Balance supervision  -     Dynamic Sitting Balance standby assist  -     Position, Sitting Balance unsupported;sitting edge of bed  -     Static Standing Balance contact guard;verbal cues  -     Dynamic Standing Balance minimal assist;verbal cues  -     Position/Device Used, Standing Balance supported;walker, front-wheeled  -     Balance Interventions sitting;standing;sit to stand;supported;static;dynamic  -[facility] Name 03/19/25 1539          Sensory Assessment (Somatosensory)    Sensory Assessment (Somatosensory) LE sensation intact  -               User Key  (r) = Recorded By, (t) = Taken By, (c) = Cosigned By      Initials Name Provider Type     Diana Morales, PT Physical Therapist                   Goals/Plan       [facility] Name 03/19/25 1543          Bed Mobility Goal 1 (PT)    Activity/Assistive Device (Bed Mobility Goal 1, PT) bed mobility activities, all  -     [place] Level/Cues Needed (Bed Mobility Goal 1, PT) standby assist  -     Time Frame (Bed Mobility Goal 1, PT) 1 week  -[facility] Name 03/19/25 1543          Transfer Goal 1 (PT)    Activity/Assistive Device (Transfer Goal 1, PT) transfers, all  -     [place] Level/Cues Needed (Transfer Goal 1, PT) contact guard required  -     Time Frame (Transfer Goal 1, PT) 1  week  -       Row Name 03/19/25 1544          Gait Training Goal 1 (PT)    Activity/Assistive Device (Gait Training Goal 1, PT) gait (walking locomotion)  -     [place] Level (Gait Training Goal 1, PT) standby assist  -     Distance (Gait Training Goal 1, PT) 100ft  -     Time Frame (Gait Training Goal 1, PT) 1 week  -       Row Name 03/19/25 1547          Therapy Assessment/Plan (PT)    Planned Therapy Interventions (PT) balance training;bed mobility training;gait training;home exercise program;patient/family education;ROM (range of motion);strengthening;stretching;transfer training  -               User Key  (r) = Recorded By, (t) = Taken By, (c) = Cosigned By      Initials Name Provider Type     Diana Morales, PT Physical Therapist                   Clinical Impression       Row Name 03/19/25 8422          Pain    Pretreatment Pain Rating 0/10 - no pain  -     Pre/Posttreatment Pain Comment C/o BLE pain with ambulation, unable to rate or localize  -[facility] Name 03/19/25 1734          Plan of Care Review    Plan of Care Reviewed With patient  -     Outcome Evaluation Pt is an 83 yo M admitted from LTC with generalized weakness and respiratory failure, on 2L O2 at time of eval. Pt reports independence at BL with a rwx, works with PT a few days/week. Pt presents to PT with impaired strength, endurance, and balance limiting overall mobility. Pt transferred to EOB with min A x1 and stood with mod A x1. Pt ambulated 12ft to bathroom with rwx and min A x1. Pt unsteady with forward flexed posture and fatigues quickly. Increased time sitting on commode but independent with hygiene. Pt required mod A x1 to stand from commode and ambulated 15ft back around bed to chair, left with needs met. PT will continue to follow, anticipate return to LTC.  -       Row Name 03/19/25 9966          Therapy Assessment/Plan (PT)    Patient/Family Therapy Goals Statement (PT) Return to [facility]  -     Rehab  Potential (PT) fair  -     Criteria for Skilled Interventions Met (PT) yes  -     Therapy Frequency (PT) 3 times/wk  -       Row Name 03/19/25 1540          Vital Signs    O2 Delivery Pre Treatment supplemental O2  -     O2 Delivery Intra Treatment room air  -     O2 Delivery Post Treatment supplemental O2  -       Row Name 03/19/25 1540          Positioning and Restraints    Pre-Treatment Position in bed  -     Post Treatment Position chair  -     In Chair notified nsg;reclined;call light within reach;encouraged to call for assist;exit alarm on  -               User Key  (r) = Recorded By, (t) = Taken By, (c) = Cosigned By      Initials Name Provider Type     Diana Morales PT Physical Therapist                   Outcome Measures       Row Name 03/19/25 1543          How much help from another person do you currently need...    Turning from your back to your side while in flat bed without using bedrails? 3  -BH     Moving from lying on back to sitting on the side of a flat bed without bedrails? 3  -BH     Moving to and from a bed to a chair (including a wheelchair)? 3  -     Standing up from a chair using your arms (e.g., wheelchair, bedside chair)? 2  -     Climbing 3-5 steps with a railing? 2  -     To walk in hospital room? 3  -     AM-PAC 6 Clicks Score (PT) 16  -     Highest Level of Mobility Goal 5 --> Static standing  -       Row Name 03/19/25 1542          Functional Assessment    Outcome Measure Options AM-PAC 6 Clicks Basic Mobility (PT)  -               User Key  (r) = Recorded By, (t) = Taken By, (c) = Cosigned By      Initials Name Provider Type     Diana Morales PT Physical Therapist                                 Physical Therapy Education       Title: PT OT SLP Therapies (In Progress)       Topic: Physical Therapy (In Progress)       Point: Mobility training (Done)       Learning Progress Summary            Patient Acceptance, E,TB,D, VU,NR by  at  3/19/2025 1543                      Point: Home exercise program (Not Started)       Learner Progress:  Not documented in this visit.              Point: Body mechanics (Done)       Learning Progress Summary            Patient Acceptance, E,TB,D, VU,NR by  at 3/19/2025 1543                      Point: Precautions (Done)       Learning Progress Summary            Patient Acceptance, E,TB,D, VU,NR by  at 3/19/2025 1543                                      User Key       Initials Effective Dates Name Provider Type Discipline     04/08/22 -  Diana Morales, PT Physical Therapist PT                  PT Recommendation and Plan  Planned Therapy Interventions (PT): balance training, bed mobility training, gait training, home exercise program, patient/family education, ROM (range of motion), strengthening, stretching, transfer training  Outcome Evaluation: Pt is an 85 yo M admitted from LTC with generalized weakness and respiratory failure, on 2L O2 at time of eval. Pt reports independence at BL with a rwx, works with PT a few days/week. Pt presents to PT with impaired strength, endurance, and balance limiting overall mobility. Pt transferred to EOB with min A x1 and stood with mod A x1. Pt ambulated 12ft to bathroom with rwx and min A x1. Pt unsteady with forward flexed posture and fatigues quickly. Increased time sitting on commode but independent with hygiene. Pt required mod A x1 to stand from commode and ambulated 15ft back around bed to chair, left with needs met. PT will continue to follow, anticipate return to LTC.     Time Calculation:   PT Evaluation Complexity  History, PT Evaluation Complexity: 1-2 personal factors and/or comorbidities  Examination of Body Systems (PT Eval Complexity): total of 3 or more elements  Clinical Presentation (PT Evaluation Complexity): stable  Clinical Decision Making (PT Evaluation Complexity): low complexity  Overall Complexity (PT Evaluation Complexity): low complexity      PT Charges       Row Name 03/19/25 1544             Time Calculation    Start Time 0827  -      Stop Time 0854  -      Time Calculation (min) 27 min  -      PT Received On 03/19/25  -      PT - Next Appointment 03/21/25  -      PT Goal Re-Cert Due Date 03/26/25  -         Time Calculation- PT    Total Timed Code Minutes- PT 23 minute(s)  -         Timed Charges    38428 - Gait Training Minutes  10  -      88934 - PT Therapeutic Activity Minutes 13  -         Total Minutes    Timed Charges Total Minutes 23  -       Total Minutes 23  -                User Key  (r) = Recorded By, (t) = Taken By, (c) = Cosigned By      Initials Name Provider Type     Diana Morales, PT Physical Therapist                  Therapy Charges for Today       Code Description Service Date Service Provider Modifiers Qty    79745739395 HC GAIT TRAINING EA 15 MIN 3/19/2025 Diana Morales, PT GP 1    36981409287 HC PT THERAPEUTIC ACT EA 15 MIN 3/19/2025 Diana Morales, PT GP 1    15402902848 HC PT EVAL LOW COMPLEXITY 3 3/19/2025 Diana Morales, PT GP 1            PT G-Codes  Outcome Measure Options: AM-PAC 6 Clicks Basic Mobility (PT)  AM-PAC 6 Clicks Score (PT): 16  PT Discharge Summary  Anticipated Discharge Disposition (PT): extended care facility    Diana Morales PT  3/19/2025

## 2025-03-19 NOTE — CASE MANAGEMENT/SOCIAL WORK
Continued Stay Note  [facility]     Patient Name: Tony Leonard  MRN: [id number]  Today's Date: 3/19/2025    Admit Date: 3/18/2025    Plan: Plan return to [facility].  KARLA Montenegro RN   Discharge Plan       Row Name 03/19/25 0836       Plan    Plan Plan return to [facility].  KARLA Montenegro RN    Patient/Family in Agreement with Plan yes    Plan Comments FACE SHEET VERIFIED/ ROMERO SIGNED.  Called and spoke with pt's son (Stef Marina [phone]) pt is from [facility] and plan is to return.  Pt can take a few steps with a walker but primarily uses a W/C.  Pt is on O2.  Pavithra  ( [phone]) called to follow. Per Pavithra pt is from skilled care but is a LTC pt with no bed hold but can return.  Plan return to [facility].  KARLA Montenegro RN                   Discharge Codes    No documentation.                 Expected Discharge Date and Time       Expected Discharge Date Expected Discharge Time    Mar 24, 2025               Marisol Montenegro RN

## 2025-03-19 NOTE — PLAN OF CARE
Goal Outcome Evaluation:  Plan of Care Reviewed With: patient        Progress: no change  Outcome Evaluation: received from ER, no complaints voiced, O22L N/C, VSS, afebrile, NSR on monitor, ambulated to bathroom x 1 with wheelchair, tolerated well, awake throughout shift, no sleep noted

## 2025-03-19 NOTE — CASE MANAGEMENT/SOCIAL WORK
Discharge Planning Assessment  [facility]     Patient Name: Tony Leonard  MRN: [id number]  Today's Date: 3/19/2025    Admit Date: 3/18/2025    Plan: Plan return to [facility].  KARLA Montenegro RN   Discharge Needs Assessment       Row Name 03/19/25 0832       Living Environment    People in Home facility resident    Current Living Arrangements extended care facility    Potentially Unsafe Housing Conditions none    In the past 12 months has the electric, gas, oil, or water company threatened to shut off services in your home? No    Primary Care Provided by self;other (see comments)  Pt is from [facility]    Provides Primary Care For no one, unable/limited ability to care for self    Family Caregiver if Needed child(brian), adult    Family Caregiver Names Son (Stef Leonard [phone])    Quality of Family Relationships helpful;involved;supportive    Able to Return to Prior Arrangements yes    Living Arrangement Comments Pt from [facility] was skilled but is a LTC.       Resource/Environmental Concerns    Resource/Environmental Concerns none    Transportation Concerns none       Transportation Needs    In the past 12 months, has lack of transportation kept you from medical appointments or from getting medications? no    In the past 12 months, has lack of transportation kept you from meetings, work, or from getting things needed for daily living? No       Food Insecurity    Within the past 12 months, you worried that your food would run out before you got the money to buy more. Never true    Within the past 12 months, the food you bought just didn't last and you didn't have money to get more. Never true       Transition Planning    Patient/Family Anticipates Transition to long-term care facility    Patient/Family Anticipated Services at Transition none    Transportation Anticipated health plan transportation       Discharge Needs Assessment    Equipment Currently Used at Home oxygen;rollator     Concerns to be Addressed no discharge needs identified;denies needs/concerns at this time    Anticipated Changes Related to Illness none    Equipment Needed After Discharge oxygen;rollator                   Discharge Plan       Row Name 03/19/25 0836       Plan    Plan Plan return to [facility].  KARLA Montenegro RN    Patient/Family in Agreement with Plan yes    Plan Comments FACE SHEET VERIFIED/ ROMERO SIGNED.  Called and spoke with pt's son (Stef Marina [phone]) pt is from [facility] and plan is to return.  Pt can take a few steps with a walker but primarily uses a W/C.  Pt is on O2.  Pavithra  ( [phone]) called to follow. Per Pavithra pt is from skilled care but is a LTC pt with no bed hold but can return.  Plan return to [facility].  KARLA Montenegro RN                  Continued Care and Services - Admitted Since 3/18/2025       Destination       Service Provider Request Status Services Address Phone Fax Patient Preferred    [facility] Pending - Request Sent -- [address] [place] [address] [id number] [phone] --                  Selected Continued Care - Prior Encounters Includes continued care and service providers with selected services from prior encounters from 12/18/2024 to 3/19/2025      Discharged on 3/4/2025 Admission date: 2/26/2025 - Discharge disposition: Intermediate Care       Destination       Service Provider Services Address Phone Fax Patient Preferred    [facility] Nursing Home [address] [id number] [phone] --                      Discharged on 2/19/2025 Admission date: 2/16/2025 - Discharge disposition: Intermediate Care       Destination       Service Provider Services Address Phone Fax Patient Preferred    [facility] Nursing Home [address] [id number] [phone] --                          Expected Discharge Date and Time       Expected  Discharge Date Expected Discharge Time    Mar 24, 2025            Demographic Summary       Row Name 03/19/25 0832       General Information    Admission Type observation    Arrived From emergency department    Required Notices Provided Observation Status Notice    Referral Source admission list    Reason for Consult discharge planning    Preferred Language English                   Functional Status       Row Name 03/19/25 0832       Functional Status    Usual Activity Tolerance moderate    Current Activity Tolerance moderate       Functional Status, IADL    Medications completely dependent    Meal Preparation completely dependent    Housekeeping completely dependent    Laundry completely dependent    Shopping completely dependent       Mental Status    General Appearance WDL WDL                   Psychosocial    No documentation.                  Abuse/Neglect    No documentation.                  Legal    No documentation.                  Substance Abuse    No documentation.                  Patient Forms    No documentation.                     Marisol Montenegro RN

## 2025-03-19 NOTE — DISCHARGE PLACEMENT REQUEST
"Tony Casey (84 y.o. Male)       Date of Birth   1940    Social Security Number       Address   [address] [facility] [place] [address]    Home Phone   [phone]    MRN   [id number]       Mosque   Caodaism    Marital Status   Single                            Admission Date   3/18/2025    Admission Type   Emergency    Admitting Provider   Madelyn Clarke MD    Attending Provider   Lance Lawson MD    Department, Room/Bed   [address], [address]/1       Discharge Date       Discharge Disposition       Discharge Destination                                 Attending Provider: Lance Lawson MD    Allergies: Daliresp [Roflumilast], Latex, Sulfa Antibiotics    Isolation: None   Infection: None   Code Status: CPR    Ht: 180.3 cm (71\")   Wt: 86.6 kg (190 lb 14.7 oz)    Admission Cmt: None   Principal Problem: Pneumonia [J18.9]                   Active Insurance as of 3/18/2025       Primary Coverage       Payor Plan Insurance Group Employer/Plan Group    MEDICARE MEDICARE A & B        Payor Plan Address Payor Plan Phone Number Payor Plan Fax Number Effective Dates    [address] [phone]  5/1/2003 - None Entered    [facility] [id number]         Subscriber Name Subscriber Birth Date Member ID       TONY CASEY 1940 [id number]               Secondary Coverage       Payor Plan Insurance Group Employer/Plan Group     FOR LIFE  FOR LIFE  SUP         Payor Plan Address Payor Plan Phone Number Payor Plan Fax Number Effective Dates    [address] [phone]  3/10/2016 - None Entered    [facility] [address]         Subscriber Name Subscriber Birth Date Member ID       TONY CASEY 1940 [id number]                     Emergency Contacts        (Rel.) Home Phone Work Phone Mobile Phone    CARMELAJOJOWILLIAM (Son) [phone] -- [phone]    Frederick Casey (Son) -- -- [phone]    Ricardo Casey (Son) " [phone] -- [phone]

## 2025-03-20 LAB
ANION GAP SERPL CALCULATED.3IONS-SCNC: 7 MMOL/L (ref 5–15)
BUN SERPL-MCNC: 16 MG/DL (ref 8–23)
BUN/CREAT SERPL: 18.2 (ref 7–25)
CALCIUM SPEC-SCNC: 8.4 MG/DL (ref 8.6–10.5)
CHLORIDE SERPL-SCNC: 106 MMOL/L (ref 98–107)
CO2 SERPL-SCNC: 27 MMOL/L (ref 22–29)
CREAT SERPL-MCNC: 0.88 MG/DL (ref 0.76–1.27)
DEPRECATED RDW RBC AUTO: 47.6 FL (ref 37–54)
EGFRCR SERPLBLD CKD-EPI 2021: 83.7 ML/MIN/1.73
ERYTHROCYTE [DISTWIDTH] IN BLOOD BY AUTOMATED COUNT: 14 % (ref 12.3–15.4)
GLUCOSE SERPL-MCNC: 94 MG/DL (ref 65–99)
HCT VFR BLD AUTO: 34.1 % (ref 37.5–51)
HGB BLD-MCNC: 11 G/DL (ref 13–17.7)
MCH RBC QN AUTO: 30.6 PG (ref 26.6–33)
MCHC RBC AUTO-ENTMCNC: 32.3 G/DL (ref 31.5–35.7)
MCV RBC AUTO: 95 FL (ref 79–97)
PLATELET # BLD AUTO: 172 10*3/MM3 (ref 140–450)
PMV BLD AUTO: 8.8 FL (ref 6–12)
POTASSIUM SERPL-SCNC: 3.6 MMOL/L (ref 3.5–5.2)
PROCALCITONIN SERPL-MCNC: 0.08 NG/ML (ref 0–0.25)
RBC # BLD AUTO: 3.59 10*6/MM3 (ref 4.14–5.8)
SODIUM SERPL-SCNC: 140 MMOL/L (ref 136–145)
WBC NRBC COR # BLD AUTO: 5.76 10*3/MM3 (ref 3.4–10.8)
WHOLE BLOOD HOLD SPECIMEN: NORMAL

## 2025-03-20 PROCEDURE — 94799 UNLISTED PULMONARY SVC/PX: CPT

## 2025-03-20 PROCEDURE — 97166 OT EVAL MOD COMPLEX 45 MIN: CPT

## 2025-03-20 PROCEDURE — 63710000001 REVEFENACIN 175 MCG/3ML SOLUTION: Performed by: STUDENT IN AN ORGANIZED HEALTH CARE EDUCATION/TRAINING PROGRAM

## 2025-03-20 PROCEDURE — 94761 N-INVAS EAR/PLS OXIMETRY MLT: CPT

## 2025-03-20 PROCEDURE — 80048 BASIC METABOLIC PNL TOTAL CA: CPT | Performed by: STUDENT IN AN ORGANIZED HEALTH CARE EDUCATION/TRAINING PROGRAM

## 2025-03-20 PROCEDURE — 94664 DEMO&/EVAL PT USE INHALER: CPT

## 2025-03-20 PROCEDURE — 85027 COMPLETE CBC AUTOMATED: CPT | Performed by: STUDENT IN AN ORGANIZED HEALTH CARE EDUCATION/TRAINING PROGRAM

## 2025-03-20 PROCEDURE — 97530 THERAPEUTIC ACTIVITIES: CPT

## 2025-03-20 PROCEDURE — 92610 EVALUATE SWALLOWING FUNCTION: CPT

## 2025-03-20 PROCEDURE — 84145 PROCALCITONIN (PCT): CPT | Performed by: INTERNAL MEDICINE

## 2025-03-20 RX ADMIN — BUDESONIDE 0.5 MG: 0.5 INHALANT RESPIRATORY (INHALATION) at 07:06

## 2025-03-20 RX ADMIN — Medication 10 ML: at 08:35

## 2025-03-20 RX ADMIN — POTASSIUM CHLORIDE 20 MEQ: 1500 TABLET, EXTENDED RELEASE ORAL at 18:20

## 2025-03-20 RX ADMIN — GABAPENTIN 200 MG: 100 CAPSULE ORAL at 17:06

## 2025-03-20 RX ADMIN — GUAIFENESIN 1200 MG: 600 TABLET, MULTILAYER, EXTENDED RELEASE ORAL at 20:43

## 2025-03-20 RX ADMIN — PANTOPRAZOLE SODIUM 40 MG: 40 TABLET, DELAYED RELEASE ORAL at 06:37

## 2025-03-20 RX ADMIN — FUROSEMIDE 20 MG: 40 TABLET ORAL at 08:34

## 2025-03-20 RX ADMIN — REVEFENACIN 175 MCG: 175 SOLUTION RESPIRATORY (INHALATION) at 07:01

## 2025-03-20 RX ADMIN — GUAIFENESIN 1200 MG: 600 TABLET, MULTILAYER, EXTENDED RELEASE ORAL at 08:34

## 2025-03-20 RX ADMIN — GABAPENTIN 200 MG: 100 CAPSULE ORAL at 08:34

## 2025-03-20 RX ADMIN — GABAPENTIN 200 MG: 100 CAPSULE ORAL at 20:43

## 2025-03-20 RX ADMIN — Medication 5 MG: at 20:43

## 2025-03-20 RX ADMIN — POTASSIUM CHLORIDE 20 MEQ: 1500 TABLET, EXTENDED RELEASE ORAL at 08:35

## 2025-03-20 RX ADMIN — BUDESONIDE 0.5 MG: 0.5 INHALANT RESPIRATORY (INHALATION) at 20:05

## 2025-03-20 RX ADMIN — FERROUS SULFATE TAB 325 MG (65 MG ELEMENTAL FE) 325 MG: 325 (65 FE) TAB at 08:35

## 2025-03-20 RX ADMIN — ARFORMOTEROL TARTRATE 15 MCG: 15 SOLUTION RESPIRATORY (INHALATION) at 07:01

## 2025-03-20 RX ADMIN — Medication 10 ML: at 21:00

## 2025-03-20 RX ADMIN — ARFORMOTEROL TARTRATE 15 MCG: 15 SOLUTION RESPIRATORY (INHALATION) at 20:09

## 2025-03-20 RX ADMIN — FLUOXETINE HYDROCHLORIDE 20 MG: 10 CAPSULE ORAL at 08:35

## 2025-03-20 RX ADMIN — Medication 1 CAPSULE: at 08:35

## 2025-03-20 NOTE — PLAN OF CARE
Goal Outcome Evaluation:              Outcome Evaluation: Clinical swallow evaluation completed. Subtle wet voice upon SLP arrival, cleared with cued throat clear and dry swallow. Harsh vocal quality. Consistent wet vocal quality and productive cued cough with thins via straw. Audible swallow also appreciated with thins via straw, suspect mistiming. No overt s/s of aspiration with ice chips, thins via cup 3/3, puree, or soft/chopped solid trials. Spontaneous double swallow with puree, question residue clearing. Voice clear. Recommend soft/chopped solid diet with thin liquids, NO mixed consistencies, NO straws. Meds whole or crushed in puree. Sitting upright, slow rate, small bites/sips, double swallow. Speech to follow with instrumental (FEES versus VFSS) next date to rule out silent aspiration.

## 2025-03-20 NOTE — PROGRESS NOTES
Dr. CORINNA Ingram    [address]        Patient ID:  Name:  Tony Leonard  MRN:  [id number]  1940  84 y.o.  male            CC/Reason for visit: Recurrent pneumonia    Interval hx: His cough is improving.  No hemoptysis  Saturating 97% on 2 L    ROS: No chest pain, no abdominal pain    Vitals:  Vitals:    03/20/25 0706 03/20/25 0711 03/20/25 0728 03/20/25 1220   BP:   107/70 107/59   BP Location:   Left arm Left arm   Patient Position:   Lying Lying   Pulse: 81 81 79 84   Resp:   18 18   Temp:   97.9 °F (36.6 °C) 98.2 °F (36.8 °C)   TempSrc:   Oral Oral   SpO2: 100% 100% 97% 97%   Weight:       Height:               Body mass index is 26.63 kg/m².    Intake/Output Summary (Last 24 hours) at 3/20/2025 1554  Last data filed at 3/20/2025 1220  Gross per 24 hour   Intake --   Output 900 ml   Net -900 ml       Exam:  GEN:  No distress  Alert, oriented x 3.   LUNGS: Some rhonchi on the left, clear on the right,, no use of accessory muscles  CV:  Normal S1S2, without murmur, no edema  ABD:  Non tender, no enlarged liver or masses      Scheduled meds:  arformoterol, 15 mcg, Nebulization, BID - RT   And  budesonide, 0.5 mg, Nebulization, BID - RT   And  revefenacin, 175 mcg, Nebulization, Daily - RT  ferrous sulfate, 325 mg, Oral, Every Other Day  FLUoxetine, 20 mg, Oral, Daily  furosemide, 20 mg, Oral, Daily With Breakfast  gabapentin, 200 mg, Oral, TID  guaiFENesin, 1,200 mg, Oral, BID  lactobacillus acidophilus, 1 capsule, Oral, Daily  pantoprazole, 40 mg, Oral, QAM AC  potassium chloride, 20 mEq, Oral, BID With Meals  sodium chloride, 10 mL, Intravenous, Q12H      IV meds:                           Data Review:   I reviewed the patient's medications and new clinical results.            Results from last 7 days   Lab Units 03/20/25  0818 03/20/25  0558 03/19/25  0539 03/18/25  1839   SODIUM mmol/L 140  --  140 137   POTASSIUM mmol/L 3.6  --  3.7 4.1   CHLORIDE mmol/L 106  --  105 99   CO2 mmol/L  27.0  --  27.0 27.9   BUN mg/dL 16  --  20 19   CREATININE mg/dL 0.88  --  1.06 1.12   CALCIUM mg/dL 8.4*  --  8.4* 9.1   BILIRUBIN mg/dL  --   --  0.4 0.6   ALK PHOS U/L  --   --  67 73   ALT (SGPT) U/L  --   --  17 19   AST (SGOT) U/L  --   --  16 18   GLUCOSE mg/dL 94  --  132* 109*   WBC 10*3/mm3 5.76  --  6.57 9.05   HEMOGLOBIN g/dL 11.0*  --  11.4* 13.1   PLATELETS 10*3/mm3 172  --  205 226   PROCALCITONIN ng/mL  --  0.08  --  0.08     Results from last 7 days   Lab Units 03/19/25  1722 03/18/25  1847 03/18/25  1839   BLOODCX   --  No growth at 24 hours No growth at 24 hours   RESPCX  Scant growth (1+) The culture consists of normal respiratory cyndee. This is a preliminary report; final report to follow.  --   --             ASSESSMENT:   Abnormal chest imaging  Atelectasis left lower lobe  Previous recurrent pneumonia  Probable dysphagia  Recent influenza infection      PLAN:  As mentioned yesterday, the patient has had 2 negative procalcitonin's and no leukocytosis.  As far as I can tell from reviewing his chest imaging, the left lower lobe area has chronic atelectasis but no new active infiltrate to suggest any air bronchograms or active pneumonia.  I do not believe the left lower lobe will reexpand.  It seems like chronic atelectasis.  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  He may require low-flow oxygen indefinitely.  Has been afebrile over the past 3 days.  Anticipate discharge him soon from pulmonary standpoint          Percy Ingram MD  3/20/2025

## 2025-03-20 NOTE — NURSING NOTE
The pt had a 19 beat run of Vtach about 1536p. CTU called and the strip was saved, he is now back to NSR.   MD acknowledged.

## 2025-03-20 NOTE — PLAN OF CARE
Goal Outcome Evaluation:  Plan of Care Reviewed With: patient        Progress: no change  Outcome Evaluation: no complaints voiced, denies pain, VSS, NSR on monitor, Eyes closed at long interval, resting quietly in room

## 2025-03-20 NOTE — PLAN OF CARE
Goal Outcome Evaluation:  Plan of Care Reviewed With: patient        Progress: no change  Outcome Evaluation: Pt is an 84 year old male adm 3/18/25 with dx including PNA, concern for dysphagia. PMHx significant for anemia, COPD, CHF, chronic respiratory failure with hypoxia. Pt resides at [facility] at baseline where he has assist for ADLs, uses a WC, but reports he was working with therapy and able to do some walking. Pt this date requires assist of one person to sit EOB and take few steps with RW. IPOT to follow while in house, recommend return to LTC with continued skilled tx at discharge.    Anticipated Discharge Disposition (OT): skilled nursing facility, extended care facility

## 2025-03-20 NOTE — THERAPY EVALUATION
Acute Care - Speech Language Pathology   Swallow Initial Evaluation [facility]     Patient Name: Tony Leonard  : 1940  MRN: [id number]  Today's Date: 3/20/2025               Admit Date: 3/18/2025    Visit Dx:     ICD-10-CM ICD-9-CM   1. Pneumonia of left lower lobe due to infectious organism  J18.9 486     Patient Active Problem List   Diagnosis    Thrush, oral    ADD (attention deficit disorder)    Hemorrhoids    Bronchiectasis with acute lower respiratory infection    Diverticul disease small and large intestine, no perforati or abscess    Benign non-nodular prostatic hyperplasia without lower urinary tract symptoms    Gastroesophageal reflux disease    Malaise and fatigue    GERD (gastroesophageal reflux disease)    Environmental allergies    Hemoptysis    Dyslipidemia    Primary osteoarthritis involving multiple joints    Pneumonia of right lower lobe due to infectious organism    Abnormal EKG    COPD (chronic obstructive pulmonary disease)    Mild malnutrition    Acute on chronic respiratory failure with hypoxia    Fracture, intertrochanteric, right femur, closed, initial encounter    Chronic diastolic CHF (congestive heart failure)    Hematoma of frontal scalp    Postoperative anemia due to acute blood loss    Urinary retention    Hemorrhagic disorder due to circulating anticoagulants    History of fracture of right hip    Closed fracture of right hip with routine healing    Chronic low back pain with sciatica    Change in bowel function    Rectal bleeding    Prostate cancer    On home oxygen therapy    Acute viral bronchitis    Neuropathy    Plantar fasciitis    HTN (hypertension)    Bilateral lower extremity edema    Microscopic hematuria    Acute midline low back pain with right-sided sciatica    Spinal stenosis, lumbar region with neurogenic claudication    Compression deformity of vertebra    Rectal bleed    Esophagitis    Angiectasia    Hiatal hernia    Overweight (BMI 25.0-29.9)     Leukocytosis    Bilateral hip pain    Elevated troponin level not due myocardial infarction    Nocturnal hypoxia    Pneumonia of right upper lobe due to infectious organism    NSTEMI (non-ST elevated myocardial infarction)    Chronic respiratory failure with hypoxia    Paroxysmal atrial fibrillation    Acute exacerbation of chronic obstructive pulmonary disease (COPD)    Anemia    Non-compliant patient    Hypokalemia    Spondylolisthesis of lumbar region    Elevated troponin    Rhabdomyolysis    Multiple contusions    Fall    Dizziness    Contusion of hip    Pulmonary embolism    COPD with acute exacerbation    Influenza A    Altered mental status    Pneumonia    Pneumonia due to E. coli     Past Medical History:   Diagnosis Date    ADHD (attention deficit hyperactivity disorder)     Altered mental status, unspecified     Anemia, unspecified     Attention-deficit hyperactivity disorder, unspecified type     Benign prostatic hyperplasia without lower urinary tract symptoms     Body mass index 26.0-26.9, adult     Bronchiectasis     Bronchiectasis, uncomplicated     Chest pain, unspecified     CHF (congestive heart failure)     Chronic diastolic (congestive) heart failure     Chronic respiratory failure with hypoxia     Cognitive communication deficit     Colon polyp     COPD (chronic obstructive pulmonary disease)     Depression, unspecified     Diaphragmatic hernia without obstruction or gangrene     Diverticulosis     Diverticulosis of both small and large intestine without perforation or abscess without bleeding     Dorsalgia, unspecified     Encounter for immunization     Eosinophilic asthma     Essential (primary) hypertension     Gastroesophageal reflux disease without esophagitis     Hard of hearing     Hyperlipidemia, unspecified     Hypertensive heart disease with congestive heart failure     Hypoxemia     Influenza due to other identified influenza virus with other respiratory manifestations     Insomnia,  unspecified     Localized edema     Mild protein-calorie malnutrition     Muscle weakness (generalized)     On home oxygen therapy     2 LITERS    Other specified abnormal findings of blood chemistry     Overweight     Paroxysmal atrial fibrillation     Personal history of malignant neoplasm of prostate     Pneumonia     Polyneuropathy, unspecified     Prostate cancer 04/22/2019    Restless leg syndrome     Sleep related hypoventilation in conditions classified elsewhere     Spinal stenosis, lumbar region with neurogenic claudication 08/02/2022    Spinal stenosis, lumbar region, with neurogenic claudication     Weakness      Past Surgical History:   Procedure Laterality Date    BRONCHOSCOPY N/A 04/30/2016    Procedure: BRONCHOSCOPY with BAL of right lower lobe and left  lower lobe.  ;  Surgeon: Nando Diaz MD;  Location: [facility] ENDOSCOPY;  Service:     BRONCHOSCOPY N/A 04/28/2017    Procedure: BRONCHOSCOPY;  Surgeon: Katharina Salter MD;  Location: [facility] ENDOSCOPY;  Service:     BRONCHOSCOPY Bilateral 06/29/2017    Procedure: BRONCHOSCOPY WITH WASHINGS;  Surgeon: Katharina Salter MD;  Location: [facility] ENDOSCOPY;  Service:     BRONCHOSCOPY N/A 01/22/2018    Procedure: BRONCHOSCOPY AT BEDSIDE with BAL;  Surgeon: Katharina Salter MD;  Location: [facility] ENDOSCOPY;  Service:     BRONCHOSCOPY N/A 01/30/2018    Procedure: BRONCHOSCOPY with BAL and washing;  Surgeon: Shreyas Wild MD;  Location: [facility] ENDOSCOPY;  Service:     BRONCHOSCOPY Bilateral 04/24/2018    Procedure: BRONCHOSCOPY WITH WASHING;  Surgeon: Katharina Salter MD;  Location: [facility] ENDOSCOPY;  Service: Pulmonary    BRONCHOSCOPY N/A 12/28/2019    Procedure: BRONCHOSCOPY with bilateral washing;  Surgeon: Katharina Salter MD;  Location: [facility] ENDOSCOPY;  Service: Pulmonary    BRONCHOSCOPY Bilateral 01/04/2023    Procedure: BRONCHOSCOPY with lavage;  Surgeon: Katharina Salter MD;  Location: [facility] ENDOSCOPY;  Service: Pulmonary;  Laterality: Bilateral;  mucus  plugging    CARDIAC CATHETERIZATION N/A 01/29/2023    Procedure: Left Heart Cath;  Surgeon: Johnnie Ang MD;  Location: [facility] CATH INVASIVE LOCATION;  Service: Cardiology;  Laterality: N/A;    CARDIAC CATHETERIZATION N/A 01/29/2023    Procedure: Coronary angiography;  Surgeon: Johnnie Ang MD;  Location: [facility] CATH INVASIVE LOC[facility];  Service: Cardiology;  Laterality: N/A;    COLONOSCOPY  05/17/2013    eh, ih, tort, sig tics    COLONOSCOPY N/A 05/10/2019    Non-thrombosed external hemorrhoids found on perianal exam, Diverticulosis, Tortuous colon, One 5 mm polyp in the mid ascending colon, IH. Path: Tubular adenoma.     COLONOSCOPY N/A 09/24/2022    Procedure: COLONOSCOPY to cecum and TI with argon plasma coagulation.;  Surgeon: Bruce Black MD;  Location: [facility] ENDOSCOPY;  Service: Gastroenterology;  Laterality: N/A;  pre- GI bleeding  post- radiation proctitis, diverticulosis    ENDOSCOPY  03/19/2015    z line irreg, hh    ENDOSCOPY N/A 09/24/2022    Procedure: ESOPHAGOGASTRODUODENOSCOPY;  Surgeon: Bruce Black MD;  Location: [facility] ENDOSCOPY;  Service: Gastroenterology;  Laterality: N/A;  pre- GI bleeding  post- esophagitis, hiatal hernia    HIP OPEN REDUCTION Right 01/17/2018    Procedure: HIP OPEN REDUCTION INTERNAL FIXATION WITH DYNAMIC HIP SCREW;  Surgeon: Les Black MD;  Location: [facility] MAIN OR;  Service:     SPINE SURGERY      TONSILLECTOMY      TOTAL HIP ARTHROPLASTY REVISION Right 09/23/2019    Procedure: HIP  REVISION RIGHT;  Surgeon: Isauro Warner MD;  Location: [facility] MAIN OR;  Service: Orthopedics       SLP Recommendation and Plan  SLP Swallowing Diagnosis: suspected pharyngeal dysphagia (03/20/25 1000)  SLP Diet Recommendation: soft to chew textures, chopped, no mixed consistencies, thin liquids (03/20/25 1000)  Recommended Precautions and Strategies: upright posture during/after eating, small bites of food and sips of liquid, no straw (03/20/25 1000)  SLP  Rec. for Method of Medication Administration: meds whole, meds crushed, with puree, as tolerated (03/20/25 1000)     Monitor for Signs of Aspiration: yes, notify SLP if any concerns (03/20/25 1000)  Recommended Diagnostics: FEES (03/20/25 1000)  Swallow Criteria for Skilled Therapeutic Interventions Met: demonstrates skilled criteria (03/20/25 1000)     Rehab Potential/Prognosis, Swallowing: good, to achieve stated therapy goals (03/20/25 1000)  Therapy Frequency (Swallow): PRN (03/20/25 1000)  Predicted Duration Therapy Intervention (Days): until discharge (03/20/25 1000)  Oral Care Recommendations: Oral Care BID/PRN (03/20/25 1000)                                        Outcome Evaluation: Clinical swallow evaluation completed. Subtle wet voice upon SLP arrival, cleared with cued throat clear and dry swallow. Harsh vocal quality. Consistent wet vocal quality and productive cued cough with thins via straw. Audible swallow also appreciated with thins via straw, suspect mistiming. No overt s/s of aspiration with ice chips, thins via cup 3/3, puree, or soft/chopped solid trials. Spontaneous double swallow with puree, question residue clearing. Voice clear. Recommend soft/chopped solid diet with thin liquids, NO mixed consistencies, NO straws. Meds whole or crushed in puree. Sitting upright, slow rate, small bites/sips, double swallow. Speech to follow with instrumental (FEES versus VFSS) next date to rule out silent aspiration.      SWALLOW EVALUATION (Last 72 Hours)       SLP Adult Swallow Evaluation       Row Name 03/20/25 1000                   Rehab Evaluation    Document Type evaluation  -CR        Subjective Information no complaints  -CR        Patient Observations alert;cooperative  -CR        Patient Effort good  -CR        Symptoms Noted During/After Treatment none  -CR           General Information    Patient Profile Reviewed yes  -CR        Pertinent History Of Current Problem Left lower lobe PNA. Recent  flu admission 3 weeks ago. Hx COPD. New orders received due to repeated PNA across 9 months; concern for aspiration.  -CR        Current Method of Nutrition regular textures;thin liquids  -CR        Precautions/Limitations, Vision WFL;for purposes of eval  -CR        Precautions/Limitations, Hearing hearing impairment, bilaterally  -CR        Prior Level of Function-Swallowing no diet consistency restrictions;other (see comments)  per pt reg/thin diet at [facility]. Hx dysphagia with reg/thin recs appreciated upon chart review.  -CR        Plans/Goals Discussed with patient;agreed upon  -CR        Barriers to Rehab none identified  -CR           Pain    Pretreatment Pain Rating 0/10 - no pain  -CR        Posttreatment Pain Rating 0/10 - no pain  -CR           Clinical Swallow Eval    Clinical Swallow Evaluation Summary Clinical swallow evaluation completed. Subtle wet voice upon SLP arrival, cleared with cued throat clear and dry swallow. Harsh vocal quality. Consistent wet vocal quality and productive cued cough with thins via straw. Audible swallow also appreciated with thins via straw, suspect mistiming. No overt s/s of aspiration with ice chips, thins via cup 3/3, puree, or soft/chopped solid trials. Spontaneous double swallow with puree, question residue clearing. Voice clear. Recommend soft/chopped solid diet with thin liquids, NO mixed consistencies, NO straws. Meds whole or crushed in puree. Sitting upright, slow rate, small bites/sips, double swallow. Speech to follow with instrumental (FEES versus VFSS) next date to rule out silent aspiration.  -CR           SLP Evaluation Clinical Impression    SLP Swallowing Diagnosis suspected pharyngeal dysphagia  -CR        Functional Impact risk of aspiration/pneumonia  -CR        Rehab Potential/Prognosis, Swallowing good, to achieve stated therapy goals  -CR        Swallow Criteria for Skilled Therapeutic Interventions Met demonstrates skilled criteria   -CR           Recommendations    Therapy Frequency (Swallow) PRN  -CR        Predicted Duration Therapy Intervention (Days) until discharge  -CR        SLP Diet Recommendation soft to chew textures;chopped;no mixed consistencies;thin liquids  -CR        Recommended Diagnostics FEES  -CR        Recommended Precautions and Strategies upright posture during/after eating;small bites of food and sips of liquid;no straw  -CR        Oral Care Recommendations Oral Care BID/PRN  -CR        SLP Rec. for Method of Medication Administration meds whole;meds crushed;with puree;as tolerated  -CR        Monitor for Signs of Aspiration yes;notify SLP if any concerns  -CR                  User Key  (r) = Recorded By, (t) = Taken By, (c) = Cosigned By      Initials Name Effective Dates    Marianna Alejandra SLP 12/03/24 -                     EDUCATION  The patient has been educated in the following areas:   Dysphagia (Swallowing Impairment).                Time Calculation:    Time Calculation- SLP       Row Name 03/20/25 1215             Time Calculation- SLP    SLP Start Time 0730  -CR      SLP Received On 03/20/25  -CR         Untimed Charges    82565-LQ Eval Oral Pharyng Swallow Minutes 60  -CR         Total Minutes    Untimed Charges Total Minutes 60  -CR       Total Minutes 60  -CR                User Key  (r) = Recorded By, (t) = Taken By, (c) = Cosigned By      Initials Name Provider Type    Marianna Alejandra SLP Speech and Language Pathologist                    Therapy Charges for Today       Code Description Service Date Service Provider Modifiers Qty    [id number] HC ST EVAL ORAL PHARYNG SWALLOW 4 3/20/2025 Marianna Joseph SLP GN 1                 BILL Driver  3/20/2025

## 2025-03-20 NOTE — THERAPY EVALUATION
Patient Name: Tony Leonard  : 1940    MRN: [id number]                              Today's Date: 3/20/2025       Admit Date: 3/18/2025    Visit Dx:     ICD-10-CM ICD-9-CM   1. Pneumonia of left lower lobe due to infectious organism  J18.9 486     Patient Active Problem List   Diagnosis    Thrush, oral    ADD (attention deficit disorder)    Hemorrhoids    Bronchiectasis with acute lower respiratory infection    Diverticul disease small and large intestine, no perforati or abscess    Benign non-nodular prostatic hyperplasia without lower urinary tract symptoms    Gastroesophageal reflux disease    Malaise and fatigue    GERD (gastroesophageal reflux disease)    Environmental allergies    Hemoptysis    Dyslipidemia    Primary osteoarthritis involving multiple joints    Pneumonia of right lower lobe due to infectious organism    Abnormal EKG    COPD (chronic obstructive pulmonary disease)    Mild malnutrition    Acute on chronic respiratory failure with hypoxia    Fracture, intertrochanteric, right femur, closed, initial encounter    Chronic diastolic CHF (congestive heart failure)    Hematoma of frontal scalp    Postoperative anemia due to acute blood loss    Urinary retention    Hemorrhagic disorder due to circulating anticoagulants    History of fracture of right hip    Closed fracture of right hip with routine healing    Chronic low back pain with sciatica    Change in bowel function    Rectal bleeding    Prostate cancer    On home oxygen therapy    Acute viral bronchitis    Neuropathy    Plantar fasciitis    HTN (hypertension)    Bilateral lower extremity edema    Microscopic hematuria    Acute midline low back pain with right-sided sciatica    Spinal stenosis, lumbar region with neurogenic claudication    Compression deformity of vertebra    Rectal bleed    Esophagitis    Angiectasia    Hiatal hernia    Overweight (BMI 25.0-29.9)    Leukocytosis    Bilateral hip pain    Elevated troponin level not due  myocardial infarction    Nocturnal hypoxia    Pneumonia of right upper lobe due to infectious organism    NSTEMI (non-ST elevated myocardial infarction)    Chronic respiratory failure with hypoxia    Paroxysmal atrial fibrillation    Acute exacerbation of chronic obstructive pulmonary disease (COPD)    Anemia    Non-compliant patient    Hypokalemia    Spondylolisthesis of lumbar region    Elevated troponin    Rhabdomyolysis    Multiple contusions    Fall    Dizziness    Contusion of hip    Pulmonary embolism    COPD with acute exacerbation    Influenza A    Altered mental status    Pneumonia    Pneumonia due to E. coli     Past Medical History:   Diagnosis Date    ADHD (attention deficit hyperactivity disorder)     Altered mental status, unspecified     Anemia, unspecified     Attention-deficit hyperactivity disorder, unspecified type     Benign prostatic hyperplasia without lower urinary tract symptoms     Body mass index 26.0-26.9, adult     Bronchiectasis     Bronchiectasis, uncomplicated     Chest pain, unspecified     CHF (congestive heart failure)     Chronic diastolic (congestive) heart failure     Chronic respiratory failure with hypoxia     Cognitive communication deficit     Colon polyp     COPD (chronic obstructive pulmonary disease)     Depression, unspecified     Diaphragmatic hernia without obstruction or gangrene     Diverticulosis     Diverticulosis of both small and large intestine without perforation or abscess without bleeding     Dorsalgia, unspecified     Encounter for immunization     Eosinophilic asthma     Essential (primary) hypertension     Gastroesophageal reflux disease without esophagitis     Hard of hearing     Hyperlipidemia, unspecified     Hypertensive heart disease with congestive heart failure     Hypoxemia     Influenza due to other identified influenza virus with other respiratory manifestations     Insomnia, unspecified     Localized edema     Mild protein-calorie malnutrition      Muscle weakness (generalized)     On home oxygen therapy     2 LITERS    Other specified abnormal findings of blood chemistry     Overweight     Paroxysmal atrial fibrillation     Personal history of malignant neoplasm of prostate     Pneumonia     Polyneuropathy, unspecified     Prostate cancer 04/22/2019    Restless leg syndrome     Sleep related hypoventilation in conditions classified elsewhere     Spinal stenosis, lumbar region with neurogenic claudication 08/02/2022    Spinal stenosis, lumbar region, with neurogenic claudication     Weakness      Past Surgical History:   Procedure Laterality Date    BRONCHOSCOPY N/A 04/30/2016    Procedure: BRONCHOSCOPY with BAL of right lower lobe and left  lower lobe.  ;  Surgeon: Nando Diaz MD;  Location: [facility] ENDOSCOPY;  Service:     BRONCHOSCOPY N/A 04/28/2017    Procedure: BRONCHOSCOPY;  Surgeon: Katharina Salter MD;  Location: [facility] ENDOSCOPY;  Service:     BRONCHOSCOPY Bilateral 06/29/2017    Procedure: BRONCHOSCOPY WITH WASHINGS;  Surgeon: Katharina Salter MD;  Location: [facility] ENDOSCOPY;  Service:     BRONCHOSCOPY N/A 01/22/2018    Procedure: BRONCHOSCOPY AT BEDSIDE with BAL;  Surgeon: Katharina Salter MD;  Location: [facility] ENDOSCOPY;  Service:     BRONCHOSCOPY N/A 01/30/2018    Procedure: BRONCHOSCOPY with BAL and washing;  Surgeon: Shreyas Wild MD;  Location: [facility] ENDOSCOPY;  Service:     BRONCHOSCOPY Bilateral 04/24/2018    Procedure: BRONCHOSCOPY WITH WASHING;  Surgeon: Katharina Salter MD;  Location: [facility] ENDOSCOPY;  Service: Pulmonary    BRONCHOSCOPY N/A 12/28/2019    Procedure: BRONCHOSCOPY with bilateral washing;  Surgeon: Katharina Salter MD;  Location: [facility] ENDOSCOPY;  Service: Pulmonary    BRONCHOSCOPY Bilateral 01/04/2023    Procedure: BRONCHOSCOPY with lavage;  Surgeon: Katharina Salter MD;  Location: [facility] ENDOSCOPY;  Service: Pulmonary;  Laterality: Bilateral;  mucus plugging    CARDIAC CATHETERIZATION N/A 01/29/2023    Procedure: Left Heart  Cath;  Surgeon: Johnnie Ang MD;  Location: [facility] CATH INVASIVE LOCATION;  Service: Cardiology;  Laterality: N/A;    CARDIAC CATHETERIZATION N/A 01/29/2023    Procedure: Coronary angiography;  Surgeon: Johnnie Ang MD;  Location: [facility] CATH INVASIVE LOCATION;  Service: Cardiology;  Laterality: N/A;    COLONOSCOPY  05/17/2013    eh, ih, tort, sig tics    COLONOSCOPY N/A 05/10/2019    Non-thrombosed external hemorrhoids found on perianal exam, Diverticulosis, Tortuous colon, One 5 mm polyp in the mid ascending colon, IH. Path: Tubular adenoma.     COLONOSCOPY N/A 09/24/2022    Procedure: COLONOSCOPY to cecum and TI with argon plasma coagulation.;  Surgeon: Bruce Black MD;  Location: [facility] ENDOSCOPY;  Service: Gastroenterology;  Laterality: N/A;  pre- GI bleeding  post- radiation proctitis, diverticulosis    ENDOSCOPY  03/19/2015    z line irreg, hh    ENDOSCOPY N/A 09/24/2022    Procedure: ESOPHAGOGASTRODUODENOSCOPY;  Surgeon: Bruce Black MD;  Location: [facility] ENDOSCOPY;  Service: Gastroenterology;  Laterality: N/A;  pre- GI bleeding  post- esophagitis, hiatal hernia    HIP OPEN REDUCTION Right 01/17/2018    Procedure: HIP OPEN REDUCTION INTERNAL FIXATION WITH DYNAMIC HIP SCREW;  Surgeon: Les Black MD;  Location: [facility] MAIN OR;  Service:     SPINE SURGERY      TONSILLECTOMY      TOTAL HIP ARTHROPLASTY REVISION Right 09/23/2019    Procedure: HIP  REVISION RIGHT;  Surgeon: Isauro Warner MD;  Location: [facility] MAIN OR;  Service: Orthopedics      General Information       Row Name 03/20/25 1302          OT Time and Intention    Subjective Information no complaints  -HE     Document Type evaluation  -HE     Mode of Treatment occupational therapy;individual therapy  -HE     Patient Effort good  -HE     Symptoms Noted During/After Treatment increased pain  -HE     Comment pain in low back with standing  -HE       Row Name 03/20/25 3686          General Information    Patient Profile  Reviewed yes  -HE     Prior Level of Function mod assist:;ADL's;transfer;gait  -     Existing Precautions/Restrictions fall;oxygen therapy device and L/min  -     Barriers to Rehab previous functional deficit  -       Row Name 03/20/25 1303          Living Environment    Current Living Arrangements extended care facility  -     People in Home facility resident  -       Row Name 03/20/25 1303          Home Main Entrance    Number of Stairs, Main Entrance none  -       Row Name 03/20/25 1303          Cognition    Orientation Status (Cognition) person;situation  -       Row Name 03/20/25 1303          Safety Issues/Impairments Affecting Functional Mobility    Impairments Affecting Function (Mobility) balance;endurance/activity tolerance;strength;pain  -               User Key  (r) = Recorded By, (t) = Taken By, (c) = Cosigned By      Initials Name Provider Type    HE Cheryl Pastor OT Occupational Therapist                     Mobility/ADL's       Row Name 03/20/25 1305          Bed Mobility    Bed Mobility supine-sit;sit-supine  -     Supine-Sit [place] (Bed Mobility) minimum assist (75% patient effort);1 person assist;verbal cues  -     Sit-Supine [place] (Bed Mobility) supervision;verbal cues  -     Assistive Device (Bed Mobility) head of bed elevated;bed rails  -       Row Name 03/20/25 1305          Transfers    Transfers sit-stand transfer  -       Row Name 03/20/25 1305          Sit-Stand Transfer    Sit-Stand [place] (Transfers) moderate assist (50% patient effort);verbal cues  -     Assistive Device (Sit-Stand Transfers) walker, front-wheeled  -       Row Name 03/20/25 1309          Functional Mobility    Functional Mobility- Ind. Level minimum assist (75% patient effort);verbal cues required  -     Functional Mobility- Device walker, front-wheeled  -     Functional Mobility- Comment pt able to take few side steps to HOB with min A with RW - cues for posture as  pt initially fwd flexed onto RW 2/2 c/o back pain  -HE     Patient was able to Ambulate yes  -HE       Row Name 03/20/25 1305          Activities of Daily Living    BADL Assessment/Intervention toileting  -HE       Row Name 03/20/25 1305          Toileting Assessment/Training    [place] Level (Toileting) independent  -HE     Assistive Devices (Toileting) urinal  -HE     Position (Toileting) supine  -HE               User Key  (r) = Recorded By, (t) = Taken By, (c) = Cosigned By      Initials Name Provider Type    Cheryl Cabrera OT Occupational Therapist                   Obj/Interventions       Row Name 03/20/25 1306          Strength Comprehensive (MMT)    Comment, General Manual Muscle Testing (MMT) Assessment grossly 4/5 BUE  -       Row Name 03/20/25 1306          Balance    Comment, Balance Min A with RW for side steps  -HE               User Key  (r) = Recorded By, (t) = Taken By, (c) = Cosigned By      Initials Name Provider Type    Cheryl Cabrera OT Occupational Therapist                   Goals/Plan       Row Name 03/20/25 1318          Transfer Goal 1 (OT)    Activity/Assistive Device (Transfer Goal 1, OT) transfers, all  -HE     [place] Level/Cues Needed (Transfer Goal 1, OT) contact guard required  -HE     Time Frame (Transfer Goal 1, OT) 2 weeks  -HE     Progress/Outcome (Transfer Goal 1, OT) new goal  -       Row Name 03/20/25 1318          Dressing Goal 1 (OT)    Activity/Device (Dressing Goal 1, OT) dressing skills, all  -HE     [place]/Cues Needed (Dressing Goal 1, OT) minimum assist (75% or more patient effort)  -HE     Time Frame (Dressing Goal 1, OT) 2 weeks  -HE     Progress/Outcome (Dressing Goal 1, OT) new goal  -HE       Row Name 03/20/25 1318          Toileting Goal 1 (OT)    Activity/Device (Toileting Goal 1, OT) commode  -HE     [place] Level/Cues Needed (Toileting Goal 1, OT) minimum assist (75% or more patient effort)  -HE     Time Frame (Toileting Goal 1,  OT) 2 weeks  -HE     Progress/Outcome (Toileting Goal 1, OT) new goal  -HE       Row Name 03/20/25 1318          Grooming Goal 1 (OT)    Activity/Device (Grooming Goal 1, OT) grooming skills, all  -HE     Time Frame (Grooming Goal 1, OT) 2 weeks  -HE     Strategies/Barriers (Grooming Goal 1, OT) seated vs standing  -HE     Progress/Outcome (Grooming Goal 1, OT) new goal  -HE       Row Name 03/20/25 1318          Therapy Assessment/Plan (OT)    Planned Therapy Interventions (OT) activity tolerance training;patient/caregiver education/training;ROM/therapeutic exercise;BADL retraining;functional balance retraining;transfer/mobility retraining;strengthening exercise;occupation/activity based interventions  -HE               User Key  (r) = Recorded By, (t) = Taken By, (c) = Cosigned By      Initials Name Provider Type    Cheryl Cabrera, RAUL Occupational Therapist                   Clinical Impression       Row Name 03/20/25 1313          Pain Assessment    Pre/Posttreatment Pain Comment back pain with standing  -HE       Row Name 03/20/25 1313          Plan of Care Review    Plan of Care Reviewed With patient  -HE     Progress no change  -HE     Outcome Evaluation Pt is an 84 year old male adm 3/18/25 with dx including PNA, concern for dysphagia. PMHx significant for anemia, COPD, CHF, chronic respiratory failure with hypoxia. Pt resides at [facility] at baseline where he has assist for ADLs, uses a WC, but reports he was working with therapy and able to do some walking. Pt this date requires assist of one person to sit EOB and take few steps with RW. IPOT to follow while in house, recommend return to LT with continued skilled tx at discharge.  -       Row Name 03/20/25 1313          Therapy Assessment/Plan (OT)    Rehab Potential (OT) good  -HE     Criteria for Skilled Therapeutic Interventions Met (OT) yes;meets criteria  -HE     Therapy Frequency (OT) 3 times/wk  -       Row Name 03/20/25 1313           Therapy Plan Review/Discharge Plan (OT)    Anticipated Discharge Disposition (OT) skilled nursing facility;extended care facility  -       Row Name 03/20/25 1313          Vital Signs    Intra SpO2 (%) 97  -HE     O2 Delivery Intra Treatment nasal cannula  2L  -HE     Intra Patient Position Sitting  -HE       Row Name 03/20/25 1313          Positioning and Restraints    Pre-Treatment Position in bed  -HE     Post Treatment Position bed  -HE     In Bed fowlers;call light within reach;exit alarm on  -HE               User Key  (r) = Recorded By, (t) = Taken By, (c) = Cosigned By      Initials Name Provider Type    HE Cheryl Pastor, RAUL Occupational Therapist                   Outcome Measures       Row Name 03/20/25 1319          How much help from another is currently needed...    Putting on and taking off regular lower body clothing? 2  -HE     Bathing (including washing, rinsing, and drying) 2  -HE     Toileting (which includes using toilet bed pan or urinal) 2  -HE     Putting on and taking off regular upper body clothing 3  -HE     Taking care of personal grooming (such as brushing teeth) 3  -HE     Eating meals 4  -HE     AM-PAC 6 Clicks Score (OT) 16  -HE       Row Name 03/20/25 0836          How much help from another person do you currently need...    Turning from your back to your side while in flat bed without using bedrails? 3  -RW     Moving from lying on back to sitting on the side of a flat bed without bedrails? 3  -RW     Moving to and from a bed to a chair (including a wheelchair)? 3  -RW     Standing up from a chair using your arms (e.g., wheelchair, bedside chair)? 2  -RW     Climbing 3-5 steps with a railing? 2  -RW     To walk in hospital room? 3  -RW     AM-PAC 6 Clicks Score (PT) 16  -RW     Highest Level of Mobility Goal 5 --> Static standing  -RW       Row Name 03/20/25 1319          Modified [place] Scale    Modified [place] Scale 4 - Moderately severe disability.  Unable to walk without  assistance, and unable to attend to own bodily needs without assistance.  -       Row Name 03/20/25 1319          Functional Assessment    Outcome Measure Options AM-PAC 6 Clicks Daily Activity (OT);Modified [place]  -               User Key  (r) = Recorded By, (t) = Taken By, (c) = Cosigned By      Initials Name Provider Type    Eric Pal, RN Registered Nurse    Cehryl Cabrera OT Occupational Therapist                    Occupational Therapy Education       Title: PT OT SLP Therapies (In Progress)       Topic: Occupational Therapy (Done)       Point: ADL training (Done)       Learning Progress Summary            Patient Acceptance, E, VU by  at 3/20/2025 1320    Comment: Pt educated on role of OT, goals of session, discharge recommendations.                      Point: Home exercise program (Done)       Learning Progress Summary            Patient Acceptance, E, VU by  at 3/20/2025 1320    Comment: Pt educated on role of OT, goals of session, discharge recommendations.                      Point: Precautions (Done)       Learning Progress Summary            Patient Acceptance, E, VU by  at 3/20/2025 1320    Comment: Pt educated on role of OT, goals of session, discharge recommendations.                      Point: Body mechanics (Done)       Learning Progress Summary            Patient Acceptance, E, VU by  at 3/20/2025 1320    Comment: Pt educated on role of OT, goals of session, discharge recommendations.                                      User Key       Initials Effective Dates Name Provider Type [facility] 02/18/25 -  Cheryl Pastor OT Occupational Therapist OT                  OT Recommendation and Plan  Planned Therapy Interventions (OT): activity tolerance training, patient/caregiver education/training, ROM/therapeutic exercise, BADL retraining, functional balance retraining, transfer/mobility retraining, strengthening exercise, occupation/activity based interventions  Therapy  Frequency (OT): 3 times/wk  Plan of Care Review  Plan of Care Reviewed With: patient  Progress: no change  Outcome Evaluation: Pt is an 84 year old male adm 3/18/25 with dx including PNA, concern for dysphagia. PMHx significant for anemia, COPD, CHF, chronic respiratory failure with hypoxia. Pt resides at [facility] at baseline where he has assist for ADLs, uses a WC, but reports he was working with therapy and able to do some walking. Pt this date requires assist of one person to sit EOB and take few steps with RW. IPOT to follow while in house, recommend return to LT with continued skilled tx at discharge.     Time Calculation:   Evaluation Complexity (OT)  Review Occupational Profile/Medical/Therapy History Complexity: expanded/moderate complexity  Assessment, Occupational Performance/Identification of Deficit Complexity: 3-5 performance deficits  Clinical Decision Making Complexity (OT): problem focused assessment/low complexity  Overall Complexity of Evaluation (OT): moderate complexity     Time Calculation- OT       Row Name 03/20/25 1320             Time Calculation- OT    OT Start Time 1149  -HE      OT Stop Time 1206  -HE      OT Time Calculation (min) 17 min  -HE         Timed Charges    97433 - OT Therapeutic Activity Minutes 8  -HE         Untimed Charges    OT Eval/Re-eval Minutes 9  -HE         Total Minutes    Timed Charges Total Minutes 8  -HE      Untimed Charges Total Minutes 9  -HE       Total Minutes 17  -HE                User Key  (r) = Recorded By, (t) = Taken By, (c) = Cosigned By      Initials Name Provider Type     Cheryl Pastor OT Occupational Therapist                  Therapy Charges for Today       Code Description Service Date Service Provider Modifiers Qty    [id number]  OT THERAPEUTIC ACT EA 15 MIN 3/20/2025 Cheryl Pastor OT GO 1    [id number] HC OT EVAL MOD COMPLEXITY 2 3/20/2025 Cheryl Pastor OT GO 1                 Cheryl Pastor OT  3/20/2025

## 2025-03-20 NOTE — PLAN OF CARE
Goal Outcome Evaluation:  Plan of Care Reviewed With: patient           Outcome Evaluation: SLP evaluated pt, diet placed accordingly.  Pills whole with applesauce, d/t posssibly silent aspiration per SLP.  FEES at bedside tomorrow AM.  A&oX4, VSS, 2lpn via n/c.  Pt up with walker.  Plan is back to [facility] [place].  Pt is very [place].

## 2025-03-20 NOTE — PROGRESS NOTES
Name: Tony Leonard ADMIT: 3/18/2025   : 1940  PCP: Alfonso Goldstein MD    MRN: [id number] LOS: 0 days   AGE/SEX: 84 y.o. male  ROOM: [facility]     Subjective   Subjective   Sitting on the toilet when I entered.  Asked him if it was okay to talk to him and he said yes as he was in the  and used to go to the bathroom with lots of other people around.  He denies any current chest pain or abdominal pain.  Denies any nausea or vomiting.  Denies any trouble breathing.  States he did have a bowel movement yesterday but is currently working on another.     Objective   Objective   Vital Signs  Temp:  [97.9 °F (36.6 °C)-98.3 °F (36.8 °C)] 98.2 °F (36.8 °C)  Heart Rate:  [] 84  Resp:  [18-20] 18  BP: (107-109)/(58-70) 107/59  SpO2:  [91 %-100 %] 97 %  on  Flow (L/min) (Oxygen Therapy):  [2] 2;   Device (Oxygen Therapy): nasal cannula  Body mass index is 26.63 kg/m².    Physical Exam  Vitals and nursing note reviewed.   Constitutional:       Appearance: He is ill-appearing. He is not toxic-appearing.   Cardiovascular:      Rate and Rhythm: Normal rate and regular rhythm.      Pulses: Normal pulses.   Pulmonary:      Effort: Pulmonary effort is normal. No respiratory distress.      Breath sounds: Normal breath sounds.   Abdominal:      General: Bowel sounds are normal. There is no distension.      Palpations: Abdomen is soft.      Tenderness: There is no abdominal tenderness.   Musculoskeletal:         General: No swelling. Normal range of motion.   Skin:     General: Skin is warm and dry.      Findings: No bruising.   Neurological:      General: No focal deficit present.      Mental Status: He is alert and oriented to person, place, and time.      Sensory: No sensory deficit.      Motor: Weakness present.      Coordination: Coordination normal.   Psychiatric:         Mood and Affect: Mood normal.         Behavior: Behavior normal.       Results Review:       I reviewed the patient's new clinical  "results.  Results from last 7 days   Lab Units 03/20/25  0818 03/19/25  0539 03/18/25  1839   WBC 10*3/mm3 5.76 6.57 9.05   HEMOGLOBIN g/dL 11.0* 11.4* 13.1   PLATELETS 10*3/mm3 172 205 226     Results from last 7 days   Lab Units 03/20/25  0818 03/19/25  0539 03/18/25  1839   SODIUM mmol/L 140 140 137   POTASSIUM mmol/L 3.6 3.7 4.1   CHLORIDE mmol/L 106 105 99   CO2 mmol/L 27.0 27.0 27.9   BUN mg/dL 16 20 19   CREATININE mg/dL 0.88 1.06 1.12   GLUCOSE mg/dL 94 132* 109*   Estimated Creatinine Clearance: 73.8 mL/min (by C-G formula based on SCr of 0.88 mg/dL).  Results from last 7 days   Lab Units 03/19/25  0539 03/18/25  1839   ALBUMIN g/dL 2.9* 3.0*   BILIRUBIN mg/dL 0.4 0.6   ALK PHOS U/L 67 73   AST (SGOT) U/L 16 18   ALT (SGPT) U/L 17 19     Results from last 7 days   Lab Units 03/20/25  0818 03/19/25  0539 03/18/25  1839   CALCIUM mg/dL 8.4* 8.4* 9.1   ALBUMIN g/dL  --  2.9* 3.0*     Results from last 7 days   Lab Units 03/20/25  0558 03/18/25  1839   PROCALCITONIN ng/mL 0.08 0.08   LACTATE mmol/L  --  1.3     No results found for: \"HGBA1C\", \"POCGLU\"    arformoterol, 15 mcg, Nebulization, BID - RT   And  budesonide, 0.5 mg, Nebulization, BID - RT   And  revefenacin, 175 mcg, Nebulization, Daily - RT  ferrous sulfate, 325 mg, Oral, Every Other Day  FLUoxetine, 20 mg, Oral, Daily  furosemide, 20 mg, Oral, Daily With Breakfast  gabapentin, 200 mg, Oral, TID  guaiFENesin, 1,200 mg, Oral, BID  lactobacillus acidophilus, 1 capsule, Oral, Daily  pantoprazole, 40 mg, Oral, QAM AC  potassium chloride, 20 mEq, Oral, BID With Meals  sodium chloride, 10 mL, Intravenous, Q12H       Diet: Cardiac; Healthy Heart (2-3 Na+); No Straw, No Mixed Consistencies; Texture: Soft to Chew (NDD 3); Soft to Chew: Chopped Meat; Fluid Consistency: Thin (IDDSI 0)       Assessment/Plan     Active Hospital Problems    Diagnosis  POA    Anemia [D64.9]  Yes    Chronic respiratory failure with hypoxia [J96.11]  Yes    Chronic diastolic CHF " (congestive heart failure) [I50.32]  Yes    COPD (chronic obstructive pulmonary disease) [J44.9]  Yes      Resolved Hospital Problems   No resolved problems to display.     Mr. Leonard is a 84 y.o. male with a known history of anemia, COPD on 2 L at baseline, chronic diastolic heart failure and recent admission for influenza and superimposed pneumonia that presented to the hospital from his rehab facility because of fever and generalized fatigue.  There was report of fever up to 101 at his facility but none here.  Chest x-ray on arrival revealed left lower lobe infiltrate concerning for pneumonia.  Pulmonology was consulted    Abnormal chest imaging  Recurrent pneumonias with recent influenza infection  Concern for dysphagia  Chronic cough  Chronic respiratory failure on 2 L  -Reviewed chest x-ray and previous CT scan.  Suspect this infiltrates from his previous hospitalization for pneumonia.    -Pulmonology consulted and they feel patient has chronic atelectasis in left lower lobe and that his left lower lobe unlikely to reexpand.  -RVP negative.  Not on antibiotics due to low suspicion for infection.  -Speech evaluation ongoing.  Plans for fees tomorrow.     COPD, not in exacerbation-continue formulary substituted nebulizer     Neuropathy-decreased dose of gabapentin per pulm recommendations     GERD-continue home PPI     Iron deficiency anemia-continue home ferrous sulfate     Discussed the patient's findings and my recommendations with patient, nursing staff and Dr. Lawson.     VTE Prophylaxis - SCDs.  Code Status - Full code-confirmed this admission  Disposition - Back to facility possibly tomorrow.    PABLITO Wang  [facility]  03/20/25  13:02 EDT

## 2025-03-21 ENCOUNTER — ANCILLARY PROCEDURE (OUTPATIENT)
Dept: SPEECH THERAPY | Facility: HOSPITAL | Age: 85
DRG: 206 | End: 2025-03-21
Payer: MEDICARE

## 2025-03-21 VITALS
HEART RATE: 71 BPM | RESPIRATION RATE: 18 BRPM | HEIGHT: 71 IN | OXYGEN SATURATION: 99 % | SYSTOLIC BLOOD PRESSURE: 112 MMHG | TEMPERATURE: 97.7 F | BODY MASS INDEX: 26.73 KG/M2 | WEIGHT: 190.92 LBS | DIASTOLIC BLOOD PRESSURE: 55 MMHG

## 2025-03-21 PROBLEM — J98.11 ATELECTASIS OF LEFT LUNG: Status: ACTIVE | Noted: 2025-02-26

## 2025-03-21 LAB
ANION GAP SERPL CALCULATED.3IONS-SCNC: 7.5 MMOL/L (ref 5–15)
BACTERIA SPEC RESP CULT: NORMAL
BUN SERPL-MCNC: 18 MG/DL (ref 8–23)
BUN/CREAT SERPL: 18.6 (ref 7–25)
CALCIUM SPEC-SCNC: 8.1 MG/DL (ref 8.6–10.5)
CHLORIDE SERPL-SCNC: 105 MMOL/L (ref 98–107)
CO2 SERPL-SCNC: 26.5 MMOL/L (ref 22–29)
CREAT SERPL-MCNC: 0.97 MG/DL (ref 0.76–1.27)
DEPRECATED RDW RBC AUTO: 50.5 FL (ref 37–54)
EGFRCR SERPLBLD CKD-EPI 2021: 76 ML/MIN/1.73
ERYTHROCYTE [DISTWIDTH] IN BLOOD BY AUTOMATED COUNT: 14.4 % (ref 12.3–15.4)
GLUCOSE SERPL-MCNC: 96 MG/DL (ref 65–99)
GRAM STN SPEC: NORMAL
HCT VFR BLD AUTO: 32.9 % (ref 37.5–51)
HGB BLD-MCNC: 10.4 G/DL (ref 13–17.7)
MCH RBC QN AUTO: 31 PG (ref 26.6–33)
MCHC RBC AUTO-ENTMCNC: 31.6 G/DL (ref 31.5–35.7)
MCV RBC AUTO: 97.9 FL (ref 79–97)
PLATELET # BLD AUTO: 177 10*3/MM3 (ref 140–450)
PMV BLD AUTO: 9.5 FL (ref 6–12)
POTASSIUM SERPL-SCNC: 4.2 MMOL/L (ref 3.5–5.2)
RBC # BLD AUTO: 3.36 10*6/MM3 (ref 4.14–5.8)
SODIUM SERPL-SCNC: 139 MMOL/L (ref 136–145)
WBC NRBC COR # BLD AUTO: 6.73 10*3/MM3 (ref 3.4–10.8)

## 2025-03-21 PROCEDURE — 94799 UNLISTED PULMONARY SVC/PX: CPT

## 2025-03-21 PROCEDURE — 97116 GAIT TRAINING THERAPY: CPT

## 2025-03-21 PROCEDURE — 85027 COMPLETE CBC AUTOMATED: CPT | Performed by: STUDENT IN AN ORGANIZED HEALTH CARE EDUCATION/TRAINING PROGRAM

## 2025-03-21 PROCEDURE — 63710000001 REVEFENACIN 175 MCG/3ML SOLUTION: Performed by: STUDENT IN AN ORGANIZED HEALTH CARE EDUCATION/TRAINING PROGRAM

## 2025-03-21 PROCEDURE — 92612 ENDOSCOPY SWALLOW (FEES) VID: CPT

## 2025-03-21 PROCEDURE — 97535 SELF CARE MNGMENT TRAINING: CPT

## 2025-03-21 PROCEDURE — 94664 DEMO&/EVAL PT USE INHALER: CPT

## 2025-03-21 PROCEDURE — 80048 BASIC METABOLIC PNL TOTAL CA: CPT | Performed by: STUDENT IN AN ORGANIZED HEALTH CARE EDUCATION/TRAINING PROGRAM

## 2025-03-21 RX ORDER — GABAPENTIN 100 MG/1
200 CAPSULE ORAL 3 TIMES DAILY
Qty: 18 CAPSULE | Refills: 0 | Status: SHIPPED | OUTPATIENT
Start: 2025-03-21 | End: 2025-03-24

## 2025-03-21 RX ADMIN — FUROSEMIDE 20 MG: 40 TABLET ORAL at 09:18

## 2025-03-21 RX ADMIN — Medication 10 ML: at 09:20

## 2025-03-21 RX ADMIN — PANTOPRAZOLE SODIUM 40 MG: 40 TABLET, DELAYED RELEASE ORAL at 09:21

## 2025-03-21 RX ADMIN — GUAIFENESIN 1200 MG: 600 TABLET, MULTILAYER, EXTENDED RELEASE ORAL at 09:19

## 2025-03-21 RX ADMIN — ARFORMOTEROL TARTRATE 15 MCG: 15 SOLUTION RESPIRATORY (INHALATION) at 12:06

## 2025-03-21 RX ADMIN — FLUOXETINE HYDROCHLORIDE 20 MG: 10 CAPSULE ORAL at 09:19

## 2025-03-21 RX ADMIN — BUDESONIDE 0.5 MG: 0.5 INHALANT RESPIRATORY (INHALATION) at 12:09

## 2025-03-21 RX ADMIN — GABAPENTIN 200 MG: 100 CAPSULE ORAL at 09:19

## 2025-03-21 RX ADMIN — REVEFENACIN 175 MCG: 175 SOLUTION RESPIRATORY (INHALATION) at 12:11

## 2025-03-21 RX ADMIN — POTASSIUM CHLORIDE 20 MEQ: 1500 TABLET, EXTENDED RELEASE ORAL at 09:19

## 2025-03-21 RX ADMIN — Medication 1 CAPSULE: at 09:19

## 2025-03-21 NOTE — CASE MANAGEMENT/SOCIAL WORK
Continued Stay Note  [facility]     Patient Name: Tony Leonard  MRN: [id number]  Today's Date: 3/21/2025    Admit Date: 3/18/2025    Plan: Plan return to [facility].  KARLA Montenegro RN   Discharge Plan       Row Name 03/21/25 0716       Plan    Plan Plan return to [facility].  KARLA Montenegro RN    Plan Comments Pt is from [facility].  [facility] ( [phone]) is following. Pt is from skilled care but is a LTC pt with no bed hold.  Pt can return.  Plan return to [facility].  KARLA Montenegro RN                   Discharge Codes    No documentation.                 Expected Discharge Date and Time       Expected Discharge Date Expected Discharge Time    Mar 24, 2025               Marisol Montenegro RN

## 2025-03-21 NOTE — PLAN OF CARE
Goal Outcome Evaluation:              Outcome Evaluation: FEES completed. Patient presents with mild pharyngeal dysphagia. See full report for further details. Recommend soft/chopped solid diet with thin liquids, NO mixed consistencies. Meds whole or crushed in puree. Sitting upright, slow rate, small bites/sips, multiple swallows per bite/sip, avoid liquid washes, volitional throat clear. Recommend pt follow with SLP at discharge facility to address pharyngeal dysphagia and training of safe swallow strategies.

## 2025-03-21 NOTE — PLAN OF CARE
Goal Outcome Evaluation:      Patient is resting well this shift and watched television until late. He is able to make his needs known, has been using his urinal without issues. VSS, no arrhythmias this shift so far.

## 2025-03-21 NOTE — CASE MANAGEMENT/SOCIAL WORK
Case Management Discharge Note      Final Note: Pt discharged to [facility] for skilled care.  KARLA Montenegro RN         Selected Continued Care - Admitted Since 3/18/2025       Destination Coordination complete.      Service Provider Services Address Phone Fax Patient Preferred    [facility] Skilled Nursing [address] [place] [id number] [phone] --              Durable Medical Equipment    No services have been selected for the patient.                Dialysis/Infusion    No services have been selected for the patient.                Home Medical Care    No services have been selected for the patient.                Therapy    No services have been selected for the patient.                Community Resources    No services have been selected for the patient.                Community & DME    No services have been selected for the patient.                    Selected Continued Care - Prior Encounters Includes continued care and service providers with selected services from prior encounters from 12/18/2024 to 3/21/2025      Discharged on 3/4/2025 Admission date: 2/26/2025 - Discharge disposition: Intermediate Care       Destination       Service Provider Services Address Phone Fax Patient Preferred    [facility] Nursing [address] [id number] [phone] --                      Discharged on 2/19/2025 Admission date: 2/16/2025 - Discharge disposition: Intermediate Care       Destination       Service Provider Services Address Phone Fax Patient Preferred    [facility] Nursing [address] [address] [phone] --                          Transportation Services  W/C Van: Skilled Nursing Facility Van    Final Discharge Disposition Code: 03 - skilled nursing facility (SNF)

## 2025-03-21 NOTE — MBS/VFSS/FEES
Acute Care - Speech Language Pathology   Swallow Initial Evaluation [facility]     Patient Name: Tony Leonard  : 1940  MRN: [id number]  Today's Date: 3/21/2025               Admit Date: 3/18/2025    Visit Dx:     ICD-10-CM ICD-9-CM   1. Pneumonia of left lower lobe due to infectious organism  J18.9 486   2. Decreased activities of daily living (ADL)  Z78.9 V49.89     Patient Active Problem List   Diagnosis    Thrush, oral    ADD (attention deficit disorder)    Hemorrhoids    Bronchiectasis with acute lower respiratory infection    Diverticul disease small and large intestine, no perforati or abscess    Benign non-nodular prostatic hyperplasia without lower urinary tract symptoms    Gastroesophageal reflux disease    Malaise and fatigue    GERD (gastroesophageal reflux disease)    Environmental allergies    Hemoptysis    Dyslipidemia    Primary osteoarthritis involving multiple joints    Pneumonia of right lower lobe due to infectious organism    Abnormal EKG    COPD (chronic obstructive pulmonary disease)    Mild malnutrition    Acute on chronic respiratory failure with hypoxia    Fracture, intertrochanteric, right femur, closed, initial encounter    Chronic diastolic CHF (congestive heart failure)    Hematoma of frontal scalp    Postoperative anemia due to acute blood loss    Urinary retention    Hemorrhagic disorder due to circulating anticoagulants    History of fracture of right hip    Closed fracture of right hip with routine healing    Chronic low back pain with sciatica    Change in bowel function    Rectal bleeding    Prostate cancer    On home oxygen therapy    Acute viral bronchitis    Neuropathy    Plantar fasciitis    HTN (hypertension)    Bilateral lower extremity edema    Microscopic hematuria    Acute midline low back pain with right-sided sciatica    Spinal stenosis, lumbar region with neurogenic claudication    Compression deformity of vertebra    Rectal bleed    Esophagitis     Angiectasia    Hiatal hernia    Overweight (BMI 25.0-29.9)    Leukocytosis    Bilateral hip pain    Elevated troponin level not due myocardial infarction    Nocturnal hypoxia    Pneumonia of right upper lobe due to infectious organism    NSTEMI (non-ST elevated myocardial infarction)    Chronic respiratory failure with hypoxia    Paroxysmal atrial fibrillation    Acute exacerbation of chronic obstructive pulmonary disease (COPD)    Anemia    Non-compliant patient    Hypokalemia    Spondylolisthesis of lumbar region    Elevated troponin    Rhabdomyolysis    Multiple contusions    Fall    Dizziness    Contusion of hip    Pulmonary embolism    COPD with acute exacerbation    Influenza A    Altered mental status    Pneumonia    Pneumonia due to E. coli     Past Medical History:   Diagnosis Date    ADHD (attention deficit hyperactivity disorder)     Altered mental status, unspecified     Anemia, unspecified     Attention-deficit hyperactivity disorder, unspecified type     Benign prostatic hyperplasia without lower urinary tract symptoms     Body mass index 26.0-26.9, adult     Bronchiectasis     Bronchiectasis, uncomplicated     Chest pain, unspecified     CHF (congestive heart failure)     Chronic diastolic (congestive) heart failure     Chronic respiratory failure with hypoxia     Cognitive communication deficit     Colon polyp     COPD (chronic obstructive pulmonary disease)     Depression, unspecified     Diaphragmatic hernia without obstruction or gangrene     Diverticulosis     Diverticulosis of both small and large intestine without perforation or abscess without bleeding     Dorsalgia, unspecified     Encounter for immunization     Eosinophilic asthma     Essential (primary) hypertension     Gastroesophageal reflux disease without esophagitis     Hard of hearing     Hyperlipidemia, unspecified     Hypertensive heart disease with congestive heart failure     Hypoxemia     Influenza due to other identified  influenza virus with other respiratory manifestations     Insomnia, unspecified     Localized edema     Mild protein-calorie malnutrition     Muscle weakness (generalized)     On home oxygen therapy     2 LITERS    Other specified abnormal findings of blood chemistry     Overweight     Paroxysmal atrial fibrillation     Personal history of malignant neoplasm of prostate     Pneumonia     Polyneuropathy, unspecified     Prostate cancer 04/22/2019    Restless leg syndrome     Sleep related hypoventilation in conditions classified elsewhere     Spinal stenosis, lumbar region with neurogenic claudication 08/02/2022    Spinal stenosis, lumbar region, with neurogenic claudication     Weakness      Past Surgical History:   Procedure Laterality Date    BRONCHOSCOPY N/A 04/30/2016    Procedure: BRONCHOSCOPY with BAL of right lower lobe and left  lower lobe.  ;  Surgeon: Nando Diaz MD;  Location: [facility] ENDOSCOPY;  Service:     BRONCHOSCOPY N/A 04/28/2017    Procedure: BRONCHOSCOPY;  Surgeon: Katharina Salter MD;  Location: [facility] ENDOSCOPY;  Service:     BRONCHOSCOPY Bilateral 06/29/2017    Procedure: BRONCHOSCOPY WITH WASHINGS;  Surgeon: Katharina Salter MD;  Location: [facility] ENDOSCOPY;  Service:     BRONCHOSCOPY N/A 01/22/2018    Procedure: BRONCHOSCOPY AT BEDSIDE with BAL;  Surgeon: Katharina Salter MD;  Location: [facility] ENDOSCOPY;  Service:     BRONCHOSCOPY N/A 01/30/2018    Procedure: BRONCHOSCOPY with BAL and washing;  Surgeon: Shreyas Wild MD;  Location: [facility] ENDOSCOPY;  Service:     BRONCHOSCOPY Bilateral 04/24/2018    Procedure: BRONCHOSCOPY WITH WASHING;  Surgeon: Katharina Salter MD;  Location: [facility] ENDOSCOPY;  Service: Pulmonary    BRONCHOSCOPY N/A 12/28/2019    Procedure: BRONCHOSCOPY with bilateral washing;  Surgeon: Katharina Salter MD;  Location: [facility] ENDOSCOPY;  Service: Pulmonary    BRONCHOSCOPY Bilateral 01/04/2023    Procedure: BRONCHOSCOPY with lavage;  Surgeon: Katharina Salter MD;  Location: [facility]  ENDOSCOPY;  Service: Pulmonary;  Laterality: Bilateral;  mucus plugging    CARDIAC CATHETERIZATION N/A 01/29/2023    Procedure: Left Heart Cath;  Surgeon: Johnnie Ang MD;  Location: [facility] CATH INVASIVE LOCATION;  Service: Cardiology;  Laterality: N/A;    CARDIAC CATHETERIZATION N/A 01/29/2023    Procedure: Coronary angiography;  Surgeon: Johnnie Ang MD;  Location: [facility] CATH INVASIVE LOCATION;  Service: Cardiology;  Laterality: N/A;    COLONOSCOPY  05/17/2013    eh, ih, tort, sig tics    COLONOSCOPY N/A 05/10/2019    Non-thrombosed external hemorrhoids found on perianal exam, Diverticulosis, Tortuous colon, One 5 mm polyp in the mid ascending colon, IH. Path: Tubular adenoma.     COLONOSCOPY N/A 09/24/2022    Procedure: COLONOSCOPY to cecum and TI with argon plasma coagulation.;  Surgeon: Bruce Black MD;  Location: [facility] ENDOSCOPY;  Service: Gastroenterology;  Laterality: N/A;  pre- GI bleeding  post- radiation proctitis, diverticulosis    ENDOSCOPY  03/19/2015    z line irreg, hh    ENDOSCOPY N/A 09/24/2022    Procedure: ESOPHAGOGASTRODUODENOSCOPY;  Surgeon: Bruce Black MD;  Location: [facility] ENDOSCOPY;  Service: Gastroenterology;  Laterality: N/A;  pre- GI bleeding  post- esophagitis, hiatal hernia    HIP OPEN REDUCTION Right 01/17/2018    Procedure: HIP OPEN REDUCTION INTERNAL FIXATION WITH DYNAMIC HIP SCREW;  Surgeon: Les Black MD;  Location: [facility] MAIN OR;  Service:     SPINE SURGERY      TONSILLECTOMY      TOTAL HIP ARTHROPLASTY REVISION Right 09/23/2019    Procedure: HIP  REVISION RIGHT;  Surgeon: Isauro Warner MD;  Location: [facility] MAIN OR;  Service: Orthopedics       SLP Recommendation and Plan  SLP Swallowing Diagnosis: mild, pharyngeal dysphagia (03/21/25 0900)  SLP Diet Recommendation: soft to chew textures, chopped, no mixed consistencies, thin liquids (03/21/25 0900)  Recommended Precautions and Strategies: upright posture during/after eating, small bites of  food and sips of liquid, multiple swallows per sip of liquid, multiple swallows per bite of food (03/21/25 0900)  SLP Rec. for Method of Medication Administration: meds whole, meds crushed, with puree, as tolerated (03/21/25 0900)     Monitor for Signs of Aspiration: yes, notify SLP if any concerns (03/21/25 0900)  Recommended Diagnostics: reassess via clinical swallow evaluation (repeat as VFSS with further aspiration concerns) (03/21/25 0900)  Swallow Criteria for Skilled Therapeutic Interventions Met: demonstrates skilled criteria (03/21/25 0900)  Anticipated Discharge Disposition (SLP): anticipate therapy at next level of care (03/21/25 0900)  Rehab Potential/Prognosis, Swallowing: good, to achieve stated therapy goals (03/21/25 0900)  Therapy Frequency (Swallow): PRN (03/21/25 0900)  Predicted Duration Therapy Intervention (Days): until discharge (03/21/25 0900)  Oral Care Recommendations: Oral Care BID/PRN (03/21/25 0900)                                        Outcome Evaluation: FEES completed. Patient presents with mild pharyngeal dysphagia. See full report for further details. Recommend soft/chopped solid diet with thin liquids, NO mixed consistencies. Meds whole or crushed in puree. Sitting upright, slow rate, small bites/sips, multiple swallows per bite/sip, avoid liquid washes, volitional throat clear. Recommend pt follow with SLP at discharge facility to address pharyngeal dysphagia and training of safe swallow strategies.       SWALLOW EVALUATION (Last 72 Hours)       SLP Adult Swallow Evaluation       Row Name 03/21/25 0900 03/20/25 1000                Rehab Evaluation    Document Type evaluation  -CR evaluation  -CR       Subjective Information no complaints  -CR no complaints  -CR       Patient Observations alert;cooperative  -CR alert;cooperative  -CR       Patient Effort good  -CR good  -CR       Symptoms Noted During/After Treatment none  -CR none  -CR          General Information    Patient  Profile Reviewed yes  -CR yes  -CR       Pertinent History Of Current Problem -- Left lower lobe PNA. Recent flu admission 3 weeks ago. Hx COPD. New orders received due to repeated PNA across 9 months; concern for aspiration.  -CR       Current Method of Nutrition soft to chew textures;chopped;no mixed consistencies;thin liquids  -CR regular textures;thin liquids  -CR       Precautions/Limitations, Vision WFL;for purposes of eval  -CR WFL;for purposes of eval  -CR       Precautions/Limitations, Hearing hearing impairment, bilaterally  -CR hearing impairment, bilaterally  -CR       Prior Level of Function-Swallowing no diet consistency restrictions  -CR no diet consistency restrictions;other (see comments)  per pt reg/thin diet at [facility]. Hx dysphagia with reg/thin recs appreciated upon chart review.  -CR       Plans/Goals Discussed with patient;agreed upon  -CR patient;agreed upon  -CR       Barriers to Rehab none identified  -CR none identified  -CR          Pain    Pretreatment Pain Rating 4/10  -CR 0/10 - no pain  -CR       Posttreatment Pain Rating 4/10  -CR 0/10 - no pain  -CR       Pain Location hip  -CR --       Pain Side/Orientation right  -CR --          Oral Motor Structure and Function    Secretion Management WNL/WFL  -CR --       Mucosal Quality moist, healthy  -CR --          Clinical Swallow Eval    Clinical Swallow Evaluation Summary -- Clinical swallow evaluation completed. Subtle wet voice upon SLP arrival, cleared with cued throat clear and dry swallow. Harsh vocal quality. Consistent wet vocal quality and productive cued cough with thins via straw. Audible swallow also appreciated with thins via straw, suspect mistiming. No overt s/s of aspiration with ice chips, thins via cup 3/3, puree, or soft/chopped solid trials. Spontaneous double swallow with puree, question residue clearing. Voice clear. Recommend soft/chopped solid diet with thin liquids, NO mixed consistencies, NO straws.  Meds whole or crushed in puree. Sitting upright, slow rate, small bites/sips, double swallow. Speech to follow with instrumental (FEES versus VFSS) next date to rule out silent aspiration.  -CR          Fiberoptic Endoscopic Evaluation of Swallowing (FEES)    Risks/Benefits Reviewed risks/benefits explained;patient;agreed to eval  -CR --       Nasal Entry right:  -CR --       Scope serial number/identification Ambu  -CR --          Anatomy and Physiology    Base of Tongue symmetrical  -CR --       Epiglottis rests posteriorly  -CR --       Laryngeal Function Phonation symmetrical  -CR --       Secretion Rating Scale (Jose et al. 1996) 1- secretions present around the laryngeal vestibule  -CR --       Secretion Description white;thick  -CR --       Ice Chips elicited swallow;partially cleared secretions  -CR --       Spontaneous Swallow frequency reduced  -CR --       Sensory sensed scope  -CR --       Utensils Used Spoon;Cup;Straw  -CR --          Initiation of Pharyngeal Swallow    Pharyngeal Phase impaired pharyngeal phase of swallowing  -CR --       Aspiration During the Swallow mixed consistency  -CR --       Penetration After the Swallow mixed consistency  -CR --       Depth of Penetration deep  -CR --       Response to Penetration Yes  -CR --       Responsed to penetration with inconsistent;cough  -CR --       Response to Aspiration Yes  -CR --       Responsed to aspiration with inconsistent;cough  -CR --       Residue pudding/puree;mechanical soft  -CR --       Response to Residue with spontaneous subsequent swallow;with cued swallow  -CR --       FEES Summary FEES completed. Patient presents with mild pharyngeal dysphagia characterized by decreased hyolaryngeal elevation, incomplete laryngeal closure, and decreased pharyngeal constriction. Posterior resting epiglottis impacted view at times. Timely swallow with ice. Spill to the valleculae with thins via cup/straw. Mild valleculae and pyriform residue. No  penetration/aspiration observed with initial trials. Timely swallow with puree and soft/chopped solid. Trace deep penetration of thin portion of soft/chopped solid after the swallow, suspect silent aspiration during the swallow. Mild-moderate diffuse pharyngeal residue reduced with cued double swallow following puree and soft solids. Liquid wash resulted in deep trace penetration of residue mixed with secretions as evidenced by material on the vocal folds after the swallow. Spontaneous cough x1/3 intances of visualized penetration which cleared material from the laryngeal vestibule. Recommend soft/chopped solid diet with thin liquids, NO mixed consistencies. Meds whole or crushed in puree. Sitting upright, slow rate, small bites/sips, multiple swallows per bite/sip, avoid liquid washes, volitional throat clear. Recommend pt follow with SLP at discharge facility to address pharyngeal dysphagia and training of safe swallow strategies.  -CR --          SLP Evaluation Clinical Impression    SLP Swallowing Diagnosis mild;pharyngeal dysphagia  -CR suspected pharyngeal dysphagia  -CR       Functional Impact risk of aspiration/pneumonia  -CR risk of aspiration/pneumonia  -CR       Rehab Potential/Prognosis, Swallowing good, to achieve stated therapy goals  -CR good, to achieve stated therapy goals  -CR       Swallow Criteria for Skilled Therapeutic Interventions Met demonstrates skilled criteria  -CR demonstrates skilled criteria  -CR          Recommendations    Therapy Frequency (Swallow) PRN  -CR PRN  -CR       Predicted Duration Therapy Intervention (Days) until discharge  -CR until discharge  -CR       SLP Diet Recommendation soft to chew textures;chopped;no mixed consistencies;thin liquids  -CR soft to chew textures;chopped;no mixed consistencies;thin liquids  -CR       Recommended Diagnostics reassess via clinical swallow evaluation  repeat as VFSS with further aspiration concerns  -CR FEES  -CR       Recommended  Precautions and Strategies upright posture during/after eating;small bites of food and sips of liquid;multiple swallows per sip of liquid;multiple swallows per bite of food; volitional throat clear -CR upright posture during/after eating;small bites of food and sips of liquid;no straw  -CR       Oral Care Recommendations Oral Care BID/PRN  -CR Oral Care BID/PRN  -CR       SLP Rec. for Method of Medication Administration meds whole;meds crushed;with puree;as tolerated  -CR meds whole;meds crushed;with puree;as tolerated  -CR       Monitor for Signs of Aspiration yes;notify SLP if any concerns  -CR yes;notify SLP if any concerns  -CR       Anticipated Discharge Disposition (SLP) anticipate therapy at next level of care  -CR --          Swallow Goals (SLP)    Swallow STGs diet tolerance goal selection (SLP);pharyngeal strengthening exercise goal selection (SLP)  -CR --       Diet Tolerance Goal Selection (SLP) Patient will tolerate trials of  -CR --       Pharyngeal Strengthening Exercise Goal Selection (SLP) pharyngeal strengthening exercise, SLP goal 1  -CR --          (STG) Patient will tolerate trials of    Consistencies Trialed (Tolerate trials) soft to chew (chopped) textures;thin liquids  -CR --       Desired Outcome (Tolerate trials) without signs/symptoms of aspiration  -CR --       [place] (Tolerate trials) independently (over 90% accuracy)  -CR --       Time Frame (Tolerate trials) by discharge  -CR --       Progress/Outcomes (Tolerate trials) new goal  -CR --          (STG) Pharyngeal Strengthening Exercise Goal 1 (SLP)    Activity (Pharyngeal Strengthening Goal 1, SLP) increase closure at entrance to airway/closure of airway at glottis  -CR --       Increase Closure at Entrance to Airway/Closure of Airway at Glottis hard effortful swallow;effortful pitch glide (falsetto + pharyngeal squeeze);chin tuck against resistance (CTAR);supraglottic swallow;moy  -CR --       [place]/Accuracy (Pharyngeal  Strengthening Goal 1, SLP) with moderate cues (50-74% accuracy)  -CR --       Time Frame (Pharyngeal Strengthening Goal 1, SLP) by discharge  -CR --       Progress/Outcomes (Pharyngeal Strengthening Goal 1, SLP) new goal  -CR --                 User Key  (r) = Recorded By, (t) = Taken By, (c) = Cosigned By      Initials Name Effective Dates    Marianna Alejandra SLP 12/03/24 -                     EDUCATION  The patient has been educated in the following areas:   Dysphagia (Swallowing Impairment).        SLP GOALS       Row Name 03/21/25 0900             (STG) Patient will tolerate trials of    Consistencies Trialed (Tolerate trials) soft to chew (chopped) textures;thin liquids  -CR      Desired Outcome (Tolerate trials) without signs/symptoms of aspiration  -CR      [place] (Tolerate trials) independently (over 90% accuracy)  -CR      Time Frame (Tolerate trials) by discharge  -CR      Progress/Outcomes (Tolerate trials) new goal  -CR         (STG) Pharyngeal Strengthening Exercise Goal 1 (SLP)    Activity (Pharyngeal Strengthening Goal 1, SLP) increase closure at entrance to airway/closure of airway at glottis  -CR      Increase Closure at Entrance to Airway/Closure of Airway at Glottis hard effortful swallow;effortful pitch glide (falsetto + pharyngeal squeeze);chin tuck against resistance (CTAR);supraglottic swallow;moy  -CR      [place]/Accuracy (Pharyngeal Strengthening Goal 1, SLP) with moderate cues (50-74% accuracy)  -CR      Time Frame (Pharyngeal Strengthening Goal 1, SLP) by discharge  -CR      Progress/Outcomes (Pharyngeal Strengthening Goal 1, SLP) new goal  -CR                User Key  (r) = Recorded By, (t) = Taken By, (c) = Cosigned By      Initials Name Provider Type    Marianna Alejandra SLP Speech and Language Pathologist                         Time Calculation:    Time Calculation- SLP       Row Name 03/21/25 0950             Time Calculation- SLP    SLP Start Time 0730  -CR       SLP Received On 03/21/25  -CR         Untimed Charges    14086-JD Fiberoptic Endo Eval Swallow Minutes 105  -CR         Total Minutes    Untimed Charges Total Minutes 105  -CR       Total Minutes 105  -CR                User Key  (r) = Recorded By, (t) = Taken By, (c) = Cosigned By      Initials Name Provider Type    Marianna Alejandra SLP Speech and Language Pathologist                    Therapy Charges for Today       Code Description Service Date Service Provider Modifiers Qty    [id number] HC ST EVAL ORAL PHARYNG SWALLOW 4 3/20/2025 Marianna Joseph, SLP GN 1    [id number]  ST FIBEROPTIC ENDO EVAL SWALL 7 3/21/2025 Marianna Joseph, SLP GN 1    [id number]  HC SCP BRONCH ASCOPE FLX DISPOSABLE 3/21/2025 Marianna Joseph, BILL  1                 BILL Driver  3/21/2025

## 2025-03-21 NOTE — CASE MANAGEMENT/SOCIAL WORK
Continued Stay Note  [facility]     Patient Name: Tony Leonard  MRN: [id number]  Today's Date: 3/21/2025    Admit Date: 3/18/2025    Plan: Plan return to [facility]- skilled care.  KARLA Montenegro RN   Discharge Plan       Row Name 03/21/25 1401       Plan    Plan Plan return to [facility]- skilled care.  KARLA Montenegro RN    Patient/Family in Agreement with Plan yes    Plan Comments Per Pavithra ( [phone]) pt has a bed and can be accepted today at [facility].  Discharge Summary in packet.  Pavithra will print Discharge Summary for facility.  Prescriptions if needed sent to facility pharmacy.  Packet to RN. [facility] Transport Van will pick pt up at 1530 to 1600.  Called pt's son (Stef Leonard [phone]) and notified him of pt's discharge.   Plan [facility] for skilled care.  KARLA Montenegro RN                   Discharge Codes    No documentation.                 Expected Discharge Date and Time       Expected Discharge Date Expected Discharge Time    Mar 21, 2025               Marisol Montenegro RN

## 2025-03-21 NOTE — PLAN OF CARE
Goal Outcome Evaluation:  Plan of Care Reviewed With: patient        Progress: improving  Outcome Evaluation: Pt seen for PT/OT co-tx this AM and tolerated mobility well. Pt transferred to EOB with min A x1 and stood with min A x2. Pt forward flexed over walker, able to correct with cues. Pt ambulated 12ft to the bathroom with min A x1 and completed all toileting tasks with OT, SBA for safety with sitting balance. Pt satting 98% on 2L O2 following toileting - improved tolerance to activity with O2 during activity (last session pt had requested removal and desatted). Pt ambulated 15ft back around room to chair with min A x1, again needing cues for posture. Pt completed several LE exercises sitting UIC and left with needs met, RN present. PT will continue to follow, anticipate return to LTC at [place].    Anticipated Discharge Disposition (PT): extended care facility

## 2025-03-21 NOTE — DISCHARGE SUMMARY
[facility]    Date of Admission: 3/18/2025  Date of Discharge:  3/21/2025    PCP: Alfonso Goldstein MD    Discharge Diagnosis:   Active Hospital Problems    Diagnosis  POA    **Atelectasis of left lung [J98.11]  Yes    Anemia [D64.9]  Yes    Chronic respiratory failure with hypoxia [J96.11]  Yes    Chronic diastolic CHF (congestive heart failure) [I50.32]  Yes    COPD (chronic obstructive pulmonary disease) [J44.9]  Yes      Resolved Hospital Problems   No resolved problems to display.     Procedures Performed  none     Consults       Date and Time Order Name Status Description    3/19/2025  9:33 AM Inpatient Pulmonology Consult Completed     3/18/2025  7:25 PM [facility] (on-call MD unless specified) Details      2/27/2025  3:49 PM Inpatient Pulmonology Consult Completed           Hospital Course  Please see history and physical for details. Patient is a 84 y.o. male with a known history of anemia, COPD on 2 L at baseline, chronic diastolic heart failure and recent admission for influenza and superimposed pneumonia that presented to the hospital from his rehab facility because of fever and generalized fatigue. There was report of fever up to 101 at his facility but none here. Chest x-ray on arrival revealed left lower lobe infiltrate concerning for pneumonia. Pulmonology was consulted.   His infiltrates on imaging were felt to be related from recent hospitalization for pneumonia and pulmonology felt that this is likely now chronic atelectasis in his left lower lobe and that he has developed some trapped lung that is unlikely to reexpand.  They feel like he is going to need oxygen indefinitely which she was already on 2 L chronically.  RVP was negative and he was not given any antibiotics during the hospital stay due to low suspicion for infection.  He remained afebrile without any leukocytosis.  There was some concerns for possible silent aspiration and he was  taken for fees evaluation with speech therapy.  He has some mild pharyngeal dysphagia and recommend soft/chopped solid diet with thin liquids, no mixed consistencies and meds whole or crushed in purée.  He needs to sit upright with slow rate and small sips/bites with multiple swallows per bite/sip.  He should continue to be evaluated by speech therapy at his facility for ongoing monitoring.  Pulmonology has cleared him for discharge and he can follow-up with them in the next 2 to 4 weeks in the outpatient setting.   His other chronic issues including neuropathy, GERD and iron deficiency anemia remained stable.  His gabapentin was slightly reduced during hospital stay and he tolerated.  On the day of discharge, he denies any chest pain, dyspnea, cough, fever or chills.  He denies any nausea, vomiting or abdominal pain.  He has been stable for the last few days and ready to return to his SNF/long-term care facility.  Patient see his PCP in the next 1 to 2 weeks.    I discussed the patient's findings and my recommendations with patient, family, nursing staff, and  . Updated son Grantward of plans and he is agreeable.    Condition on Discharge: Improved.     Temp:  [98.2 °F (36.8 °C)-98.4 °F (36.9 °C)] 98.2 °F (36.8 °C)  Heart Rate:  [71-91] 71  Resp:  [18] 18  BP: ()/(56-61) 108/56  Body mass index is 26.63 kg/m².    Physical Exam  Vitals and nursing note reviewed.   Constitutional:       Appearance: He is ill-appearing (chronically). He is not toxic-appearing.   Cardiovascular:      Rate and Rhythm: Normal rate and regular rhythm.      Pulses: Normal pulses.   Pulmonary:      Effort: Pulmonary effort is normal. No respiratory distress.      Breath sounds: diminished breath sounds on 2 L  Abdominal:      General: Bowel sounds are normal. There is no distension.      Palpations: Abdomen is soft.      Tenderness: There is no abdominal tenderness.   Musculoskeletal:         General: No swelling. Normal range of  motion.   Skin:     General: Skin is warm and dry.      Findings: No bruising.   Neurological:      General: No focal deficit present.      Mental Status: He is alert and oriented to person, place, and time.      Sensory: No sensory deficit.      Motor: Weakness present.      Coordination: Coordination normal.   Psychiatric:         Mood and Affect: Mood normal.         Behavior: Behavior normal.        Discharge Medications        Changes to Medications        Instructions Start Date   gabapentin 100 MG capsule  Commonly known as: NEURONTIN  What changed:   medication strength  how much to take   200 mg, Oral, 3 Times Daily             Continue These Medications        Instructions Start Date   acetaminophen 325 MG tablet  Commonly known as: TYLENOL   650 mg, Oral, Every 4 Hours PRN      albuterol (2.5 MG/3ML) 0.083% nebulizer solution  Commonly known as: PROVENTIL   2.5 mg, Nebulization, Every 6 Hours PRN      Aplisol 5 UNIT/0.1ML injection  Generic drug: tuberculin   5 Units, Once      dimethicone 1.3 % lotion lotion  Commonly known as: AVEENO   1 Application, Every 12 Hours PRN      ferrous sulfate 325 (65 FE) MG tablet   325 mg, Daily With Breakfast      FLUoxetine 20 MG capsule  Commonly known as: PROzac   TAKE 1 CAPSULE DAILY      furosemide 20 MG tablet  Commonly known as: LASIX   20 mg, 2 Times Daily      guaiFENesin 600 MG 12 hr tablet  Commonly known as: MUCINEX   1,200 mg, 2 Times Daily      lactobacillus tablet caplet   1 tablet, 2 Times Daily      Melatonin 3 MG capsule   3 mg, As Needed      menthol-zinc oxide 0.44-20.625 % ointment ointment  Commonly known as: CALMOSEPTINE   1 Application, Every 12 Hours Scheduled      Multivitamin Adult tablet tablet  Generic drug: multivitamin with minerals   1 tablet, Oral, Daily      neomycin-bacitracin-polymyxin 5-400-5000 ointment   1 Application, Daily      ondansetron ODT 4 MG disintegrating tablet  Commonly known as: ZOFRAN-ODT   4 mg, Translingual, Every 6  Hours PRN      pantoprazole 40 MG EC tablet  Commonly known as: PROTONIX   40 mg, Oral, Daily      potassium chloride 20 MEQ packet  Commonly known as: KLOR-CON   20 mEq, Daily      sodium chloride 7 % nebulizer solution nebulizer solution   4 mL, Nebulization, Every 4 Hours PRN      sucralfate 1 g tablet  Commonly known as: Carafate   1 g, Oral, 4 Times Daily      [employer] Back Pain Therapy misc   1 Pad, Daily      Trelegy Ellipta 100-62.5-25 MCG/ACT inhaler  Generic drug: Fluticasone-Umeclidin-Vilant   1 puff, Inhalation, Daily - RT             Stop These Medications      doxycycline 100 MG tablet  Commonly known as: VIBRAMYICN             Diet Instructions       Diet: Regular/House Diet, Cardiac Diets; Healthy Heart (2-3 Na+); Soft to Chew (NDD 3); Chopped Meat; Thin (IDDSI 0)      Discharge Diet:  Regular/House Diet  Cardiac Diets       Cardiac Diet: Healthy Heart (2-3 Na+)    Texture: Soft to Chew (NDD 3)    Soft to Chew: Chopped Meat    Fluid Consistency: Thin (IDDSI 0)           Activity Instructions       Activity as Tolerated             Additional Instructions for the Follow-ups that You Need to Schedule       Discharge Follow-up with PCP   As directed       Currently Documented PCP:    Alfonso Goldstein MD    PCP Phone Number:    [phone]     Follow Up Details: see in 1-2 weeks        Discharge Follow-up with Specified Provider: Pulmonology; 3 Weeks   As directed      To: Pulmonology   Follow Up: 3 Weeks               Follow-up Information       Alfonso Goldstein MD .    Specialty: Family Medicine  Why: see in 1-2 weeks  Contact information:  [address] [place] [address]  [phone]                           Test Results Pending at Discharge  Pending Labs       Order Current Status    Blood Culture - Blood, Arm, Left Preliminary result    Blood Culture - Blood, Arm, Right Preliminary result           PABLITO Wang  03/21/25  14:02 EDT    Discharge time spent  greater than 30 minutes.

## 2025-03-21 NOTE — PLAN OF CARE
Goal Outcome Evaluation:  Plan of Care Reviewed With: patient           Outcome Evaluation: Pt to be DC back to [facility].  Report to be called.  VSS, RA, NSR.   time is [id number].  IV to be removed.

## 2025-03-21 NOTE — PLAN OF CARE
Goal Outcome Evaluation:  Plan of Care Reviewed With: patient           Outcome Evaluation: Patient with good participation in OT/PT cotreatment this AM. Therapeutic intervention focused on increasing patient task tolerance through completion of ADLs focused on increasing standing time with ADL participation. Patient tolerates toileting task, however requies 3 seated rest breaks with short task engagement, labored breathing noted. Patient continues to require assist with pericare and CGA with static standing balance at sinkside. Patient will benefit from continued skilled OT intervention to address above mentioned deficits.    Anticipated Discharge Disposition (OT): extended care facility

## 2025-03-21 NOTE — PROGRESS NOTES
Dr. CORINNA Ingram    [address]        Patient ID:  Name:  Tony Leonard  MRN:  [id number]  1940  84 y.o.  male            CC/Reason for visit: Recurrent pneumonia    Interval hx: He feels well.  No shortness of breath or cough at rest  Saturating 99% on 2 L    ROS: No chest pain, abdominal pain    Vitals:  Vitals:    03/20/25 2256 03/21/25 0700 03/21/25 1206 03/21/25 1216   BP: 104/61 108/56     BP Location: Left arm Left arm     Patient Position: Lying Lying     Pulse: 90  74 71   Resp: 18 18 18 18   Temp: 98.4 °F (36.9 °C) 98.2 °F (36.8 °C)     TempSrc: Oral Oral     SpO2: 92%  99%    Weight:       Height:               Body mass index is 26.63 kg/m².    Intake/Output Summary (Last 24 hours) at 3/21/2025 1249  Last data filed at 3/21/2025 0550  Gross per 24 hour   Intake --   Output 425 ml   Net -425 ml       Exam:  GEN:  No distress elderly who appears chronically ill and frail  Alert, oriented x 3.   LUNGS: Diminished breath sounds over the bases bilat, no use of accessory muscles  CV:  Normal S1S2, without murmur, no edema  ABD:  Non tender, no enlarged liver or masses      Scheduled meds:  arformoterol, 15 mcg, Nebulization, BID - RT   And  budesonide, 0.5 mg, Nebulization, BID - RT   And  revefenacin, 175 mcg, Nebulization, Daily - RT  ferrous sulfate, 325 mg, Oral, Every Other Day  FLUoxetine, 20 mg, Oral, Daily  furosemide, 20 mg, Oral, Daily With Breakfast  gabapentin, 200 mg, Oral, TID  guaiFENesin, 1,200 mg, Oral, BID  lactobacillus acidophilus, 1 capsule, Oral, Daily  pantoprazole, 40 mg, Oral, QAM AC  potassium chloride, 20 mEq, Oral, BID With Meals  sodium chloride, 10 mL, Intravenous, Q12H      IV meds:                           Data Review:   I reviewed the patient's medications and new clinical results.            Results from last 7 days   Lab Units 03/21/25  0347 03/20/25  0818 03/20/25  0558 03/19/25  0539 03/18/25  1839   SODIUM mmol/L 139 140  --  140 137   POTASSIUM  mmol/L 4.2 3.6  --  3.7 4.1   CHLORIDE mmol/L 105 106  --  105 99   CO2 mmol/L 26.5 27.0  --  27.0 27.9   BUN mg/dL 18 16  --  20 19   CREATININE mg/dL 0.97 0.88  --  1.06 1.12   CALCIUM mg/dL 8.1* 8.4*  --  8.4* 9.1   BILIRUBIN mg/dL  --   --   --  0.4 0.6   ALK PHOS U/L  --   --   --  67 73   ALT (SGPT) U/L  --   --   --  17 19   AST (SGOT) U/L  --   --   --  16 18   GLUCOSE mg/dL 96 94  --  132* 109*   WBC 10*3/mm3 6.73 5.76  --  6.57 9.05   HEMOGLOBIN g/dL 10.4* 11.0*  --  11.4* 13.1   PLATELETS 10*3/mm3 177 172  --  205 226   PROCALCITONIN ng/mL  --   --  0.08  --  0.08     Results from last 7 days   Lab Units 03/19/25  1722 03/18/25  1847 03/18/25  1839   BLOODCX   --  No growth at 2 days No growth at 2 days   RESPCX  Scant growth (1+) Normal respiratory cyndee. No S. aureus or Pseudomonas aeruginosa detected. Final report.  --   --             ASSESSMENT:   Abnormal chest imaging  Atelectasis left lower lobe  Previous recurrent pneumonia  Probable dysphagia  Recent influenza infection      PLAN:  Patient with chronic left lower lobe atelectasis.  CT imaging does not show air bronchograms to suggest new parenchymal consolidation or new pneumonia.  Lab work does not suggest pneumonia either.  Clinically the patient is nontoxic  Saturating 98% on 2 L.  Continue trying to wean off oxygen.  He may require oxygen indefinitely because the left lower lobe atelectasis is probably chronic, trapped lung and doubt it will reexpand.  Ready for discharge anytime from our standpoint.  We will sign off        Percy Ingram MD  3/21/2025

## 2025-03-21 NOTE — THERAPY TREATMENT NOTE
Patient Name: Tony Leonard  : 1940    MRN: [id number]                              Today's Date: 3/21/2025       Admit Date: 3/18/2025    Visit Dx:     ICD-10-CM ICD-9-CM   1. Pneumonia of left lower lobe due to infectious organism  J18.9 486   2. Decreased activities of daily living (ADL)  Z78.9 V49.89     Patient Active Problem List   Diagnosis    Thrush, oral    ADD (attention deficit disorder)    Hemorrhoids    Bronchiectasis with acute lower respiratory infection    Diverticul disease small and large intestine, no perforati or abscess    Benign non-nodular prostatic hyperplasia without lower urinary tract symptoms    Gastroesophageal reflux disease    Malaise and fatigue    GERD (gastroesophageal reflux disease)    Environmental allergies    Hemoptysis    Dyslipidemia    Primary osteoarthritis involving multiple joints    Pneumonia of right lower lobe due to infectious organism    Abnormal EKG    COPD (chronic obstructive pulmonary disease)    Mild malnutrition    Acute on chronic respiratory failure with hypoxia    Fracture, intertrochanteric, right femur, closed, initial encounter    Chronic diastolic CHF (congestive heart failure)    Hematoma of frontal scalp    Postoperative anemia due to acute blood loss    Urinary retention    Hemorrhagic disorder due to circulating anticoagulants    History of fracture of right hip    Closed fracture of right hip with routine healing    Chronic low back pain with sciatica    Change in bowel function    Rectal bleeding    Prostate cancer    On home oxygen therapy    Acute viral bronchitis    Neuropathy    Plantar fasciitis    HTN (hypertension)    Bilateral lower extremity edema    Microscopic hematuria    Acute midline low back pain with right-sided sciatica    Spinal stenosis, lumbar region with neurogenic claudication    Compression deformity of vertebra    Rectal bleed    Esophagitis    Angiectasia    Hiatal hernia    Overweight (BMI 25.0-29.9)     Leukocytosis    Bilateral hip pain    Elevated troponin level not due myocardial infarction    Nocturnal hypoxia    Pneumonia of right upper lobe due to infectious organism    NSTEMI (non-ST elevated myocardial infarction)    Chronic respiratory failure with hypoxia    Paroxysmal atrial fibrillation    Acute exacerbation of chronic obstructive pulmonary disease (COPD)    Anemia    Non-compliant patient    Hypokalemia    Spondylolisthesis of lumbar region    Elevated troponin    Rhabdomyolysis    Multiple contusions    Fall    Dizziness    Contusion of hip    Pulmonary embolism    COPD with acute exacerbation    Influenza A    Altered mental status    Pneumonia    Pneumonia due to E. coli     Past Medical History:   Diagnosis Date    ADHD (attention deficit hyperactivity disorder)     Altered mental status, unspecified     Anemia, unspecified     Attention-deficit hyperactivity disorder, unspecified type     Benign prostatic hyperplasia without lower urinary tract symptoms     Body mass index 26.0-26.9, adult     Bronchiectasis     Bronchiectasis, uncomplicated     Chest pain, unspecified     CHF (congestive heart failure)     Chronic diastolic (congestive) heart failure     Chronic respiratory failure with hypoxia     Cognitive communication deficit     Colon polyp     COPD (chronic obstructive pulmonary disease)     Depression, unspecified     Diaphragmatic hernia without obstruction or gangrene     Diverticulosis     Diverticulosis of both small and large intestine without perforation or abscess without bleeding     Dorsalgia, unspecified     Encounter for immunization     Eosinophilic asthma     Essential (primary) hypertension     Gastroesophageal reflux disease without esophagitis     Hard of hearing     Hyperlipidemia, unspecified     Hypertensive heart disease with congestive heart failure     Hypoxemia     Influenza due to other identified influenza virus with other respiratory manifestations     Insomnia,  unspecified     Localized edema     Mild protein-calorie malnutrition     Muscle weakness (generalized)     On home oxygen therapy     2 LITERS    Other specified abnormal findings of blood chemistry     Overweight     Paroxysmal atrial fibrillation     Personal history of malignant neoplasm of prostate     Pneumonia     Polyneuropathy, unspecified     Prostate cancer 04/22/2019    Restless leg syndrome     Sleep related hypoventilation in conditions classified elsewhere     Spinal stenosis, lumbar region with neurogenic claudication 08/02/2022    Spinal stenosis, lumbar region, with neurogenic claudication     Weakness      Past Surgical History:   Procedure Laterality Date    BRONCHOSCOPY N/A 04/30/2016    Procedure: BRONCHOSCOPY with BAL of right lower lobe and left  lower lobe.  ;  Surgeon: Nando Diaz MD;  Location: [facility] ENDOSCOPY;  Service:     BRONCHOSCOPY N/A 04/28/2017    Procedure: BRONCHOSCOPY;  Surgeon: Katharina Salter MD;  Location: [facility] ENDOSCOPY;  Service:     BRONCHOSCOPY Bilateral 06/29/2017    Procedure: BRONCHOSCOPY WITH WASHINGS;  Surgeon: Katharina Salter MD;  Location: [facility] ENDOSCOPY;  Service:     BRONCHOSCOPY N/A 01/22/2018    Procedure: BRONCHOSCOPY AT BEDSIDE with BAL;  Surgeon: Katharina Salter MD;  Location: [facility] ENDOSCOPY;  Service:     BRONCHOSCOPY N/A 01/30/2018    Procedure: BRONCHOSCOPY with BAL and washing;  Surgeon: Shreyas Wild MD;  Location: [facility] ENDOSCOPY;  Service:     BRONCHOSCOPY Bilateral 04/24/2018    Procedure: BRONCHOSCOPY WITH WASHING;  Surgeon: Katharina Salter MD;  Location: [facility] ENDOSCOPY;  Service: Pulmonary    BRONCHOSCOPY N/A 12/28/2019    Procedure: BRONCHOSCOPY with bilateral washing;  Surgeon: Katharina Salter MD;  Location: [facility] ENDOSCOPY;  Service: Pulmonary    BRONCHOSCOPY Bilateral 01/04/2023    Procedure: BRONCHOSCOPY with lavage;  Surgeon: Katharina Salter MD;  Location: [facility] ENDOSCOPY;  Service: Pulmonary;  Laterality: Bilateral;  mucus  plugging    CARDIAC CATHETERIZATION N/A 01/29/2023    Procedure: Left Heart Cath;  Surgeon: Johnnie Ang MD;  Location: [facility] CATH INVASIVE LOCATION;  Service: Cardiology;  Laterality: N/A;    CARDIAC CATHETERIZATION N/A 01/29/2023    Procedure: Coronary angiography;  Surgeon: Johnnie Ang MD;  Location: [facility] CATH INVASIVE LOCATION;  Service: Cardiology;  Laterality: N/A;    COLONOSCOPY  05/17/2013    eh, ih, tort, sig tics    COLONOSCOPY N/A 05/10/2019    Non-thrombosed external hemorrhoids found on perianal exam, Diverticulosis, Tortuous colon, One 5 mm polyp in the mid ascending colon, IH. Path: Tubular adenoma.     COLONOSCOPY N/A 09/24/2022    Procedure: COLONOSCOPY to cecum and TI with argon plasma coagulation.;  Surgeon: Bruce Black MD;  Location: [facility] ENDOSCOPY;  Service: Gastroenterology;  Laterality: N/A;  pre- GI bleeding  post- radiation proctitis, diverticulosis    ENDOSCOPY  03/19/2015    z line irreg, hh    ENDOSCOPY N/A 09/24/2022    Procedure: ESOPHAGOGASTRODUODENOSCOPY;  Surgeon: Bruce Black MD;  Location: [facility] ENDOSCOPY;  Service: Gastroenterology;  Laterality: N/A;  pre- GI bleeding  post- esophagitis, hiatal hernia    HIP OPEN REDUCTION Right 01/17/2018    Procedure: HIP OPEN REDUCTION INTERNAL FIXATION WITH DYNAMIC HIP SCREW;  Surgeon: Les Black MD;  Location: [facility] MAIN OR;  Service:     SPINE SURGERY      TONSILLECTOMY      TOTAL HIP ARTHROPLASTY REVISION Right 09/23/2019    Procedure: HIP  REVISION RIGHT;  Surgeon: Isauro Warner MD;  Location: [facility] MAIN OR;  Service: Orthopedics      General Information       Row Name 03/21/25 1017          Physical Therapy Time and Intention    Document Type therapy note (daily note)  -     Mode of Treatment co-treatment;physical therapy;occupational therapy  -       Row Name 03/21/25 1017          General Information    Patient Profile Reviewed yes  -     Existing Precautions/Restrictions fall;oxygen  therapy device and L/min  -       Row Name 03/21/25 1017          Cognition    Orientation Status (Cognition) oriented to;person;situation  -       Row Name 03/21/25 1017          Safety Issues/Impairments Affecting Functional Mobility    Impairments Affecting Function (Mobility) balance;endurance/activity tolerance;strength;pain  -     Comment, Safety Issues/Impairments (Mobility) Co treatment medically appropriate and necessary due to patient acuity level, activity tolerance and safety of patient and staff. Treatment is focusing on progression of care and goals established in the POC.  -               User Key  (r) = Recorded By, (t) = Taken By, (c) = Cosigned By      Initials Name Provider Type     Diana Morales PT Physical Therapist                   Mobility       Row Name 03/21/25 1017          Bed Mobility    Bed Mobility supine-sit  -     Supine-Sit [place] (Bed Mobility) minimum assist (75% patient effort);verbal cues;1 person assist  -     Sit-Supine [place] (Bed Mobility) --  NT - UIC  -     Assistive Device (Bed Mobility) head of bed elevated;bed rails  -       Row Name 03/21/25 1017          Sit-Stand Transfer    Sit-Stand [place] (Transfers) minimum assist (75% patient effort);2 person assist  -     Assistive Device (Sit-Stand Transfers) walker, front-wheeled  -       Row Name 03/21/25 1017          Gait/Stairs (Locomotion)    [place] Level (Gait) verbal cues;minimum assist (75% patient effort);1 person assist  -     Assistive Device (Gait) walker, front-wheeled  -     Distance in Feet (Gait) 12  +15ft  -     Deviations/Abnormal Patterns (Gait) antalgic;norm decreased;gait speed decreased;stride length decreased  -     Bilateral Gait Deviations forward flexed posture  -               User Key  (r) = Recorded By, (t) = Taken By, (c) = Cosigned By      Initials Name Provider Type     Diana Morales PT Physical Therapist                    Obj/Interventions       Row Name 03/21/25 1018          Motor Skills    Therapeutic Exercise --  3x10 reps B AP/LAQ/seated marches  -               User Key  (r) = Recorded By, (t) = Taken By, (c) = Cosigned By      Initials Name Provider Type     Diana Morales, PT Physical Therapist                   Goals/Plan    No documentation.                  Clinical Impression       Row Name 03/21/25 1018          Pain    Pretreatment Pain Rating 0/10 - no pain  -     Posttreatment Pain Rating 0/10 - no pain  -[facility] Name 03/21/25 1018          Plan of Care Review    Plan of Care Reviewed With patient  -     Progress improving  -     Outcome Evaluation Pt seen for PT/OT co-tx this AM and tolerated mobility well. Pt transferred to EOB with min A x1 and stood with min A x2. Pt forward flexed over walker, able to correct with cues. Pt ambulated 12ft to the bathroom with min A x1 and completed all toileting tasks with OT, SBA for safety with sitting balance. Pt satting 98% on 2L O2 following toileting - improved tolerance to activity with O2 during activity (last session pt had requested removal and desatted). Pt ambulated 15ft back around room to chair with min A x1, again needing cues for posture. Pt completed several LE exercises sitting UI and left with needs met, RN present. PT will continue to follow, anticipate return to LTC at [place].  -       Row Name 03/21/25 1018          Vital Signs    O2 Delivery Pre Treatment supplemental O2  -     O2 Delivery Intra Treatment supplemental O2  -     O2 Delivery Post Treatment supplemental O2  -       Row Name 03/21/25 1018          Positioning and Restraints    Pre-Treatment Position in bed  -     Post Treatment Position chair  -     In Chair reclined;call light within reach;encouraged to call for assist;exit alarm on;with [facility]  -               User Key  (r) = Recorded By, (t) = Taken By, (c) = Cosigned By      Initials Name Provider Type    QUIANA Morales  Diana MOORE, PT Physical Therapist                   Outcome Measures       Row Name 03/21/25 1023 03/21/25 0921       How much help from another person do you currently need...    Turning from your back to your side while in flat bed without using bedrails? 3  -BH 3  -RW    Moving from lying on back to sitting on the side of a flat bed without bedrails? 3  -BH 3  -RW    Moving to and from a bed to a chair (including a wheelchair)? 3  -BH 3  -RW    Standing up from a chair using your arms (e.g., wheelchair, bedside chair)? 3  -BH 2  -RW    Climbing 3-5 steps with a railing? 2  -BH 2  -RW    To walk in hospital room? 3  -BH 3  -RW    AM-PAC 6 Clicks Score (PT) 17  -BH 16  -RW    Highest Level of Mobility Goal 5 --> Static standing  -BH 5 --> Static standing  -RW      Row Name 03/21/25 0000          How much help from another person do you currently need...    Turning from your back to your side while in flat bed without using bedrails? 3  -MS     Moving from lying on back to sitting on the side of a flat bed without bedrails? 3  -MS     Moving to and from a bed to a chair (including a wheelchair)? 3  -MS     Standing up from a chair using your arms (e.g., wheelchair, bedside chair)? 2  -MS     Climbing 3-5 steps with a railing? 2  -MS     To walk in hospital room? 3  -MS     AM-PAC 6 Clicks Score (PT) 16  -MS     Highest Level of Mobility Goal 5 --> Static standing  -MS       Row Name 03/21/25 1023 03/21/25 0951       Functional Assessment    Outcome Measure Options AM-PAC 6 Clicks Basic Mobility (PT)  - AM-PAC 6 Clicks Daily Activity (OT)  -ES              User Key  (r) = Recorded By, (t) = Taken By, (c) = Cosigned By      Initials Name Provider Type    RW Eric Ritter, RN Registered Nurse    Gail Montiel, RN Registered Nurse    Diana Draper, PT Physical Therapist    Maryellen De León, OTR/L, CSRS Occupational Therapist                                 Physical Therapy Education       Title: PT OT SLP  Therapies (Done)       Topic: Physical Therapy (Done)       Point: Mobility training (Done)       Learning Progress Summary            Patient Acceptance, E,TB,D, VU,NR by  at 3/21/2025 1023    Acceptance, E,TB,D, VU,NR by  at 3/19/2025 1543                      Point: Home exercise program (Done)       Learning Progress Summary            Patient Acceptance, E,TB,D, VU,NR by  at 3/21/2025 1023                      Point: Body mechanics (Done)       Learning Progress Summary            Patient Acceptance, E,TB,D, VU,NR by  at 3/21/2025 1023    Acceptance, E,TB,D, VU,NR by  at 3/19/2025 1543                      Point: Precautions (Done)       Learning Progress Summary            Patient Acceptance, E,TB,D, VU,NR by  at 3/21/2025 1023    Acceptance, E,TB,D, VU,NR by  at 3/19/2025 1543                                      User Key       Initials Effective Dates Name Provider Type Discipline     04/08/22 -  Diana Morales, PT Physical Therapist PT                  PT Recommendation and Plan  Planned Therapy Interventions (PT): balance training, bed mobility training, gait training, home exercise program, patient/family education, ROM (range of motion), strengthening, stretching, transfer training  Progress: improving  Outcome Evaluation: Pt seen for PT/OT co-tx this AM and tolerated mobility well. Pt transferred to EOB with min A x1 and stood with min A x2. Pt forward flexed over walker, able to correct with cues. Pt ambulated 12ft to the bathroom with min A x1 and completed all toileting tasks with OT, SBA for safety with sitting balance. Pt satting 98% on 2L O2 following toileting - improved tolerance to activity with O2 during activity (last session pt had requested removal and desatted). Pt ambulated 15ft back around room to chair with min A x1, again needing cues for posture. Pt completed several LE exercises sitting UIC and left with needs met, RN present. PT will continue to follow, anticipate  return to LTC at [place].     Time Calculation:   PT Evaluation Complexity  History, PT Evaluation Complexity: 1-2 personal factors and/or comorbidities  Examination of Body Systems (PT Eval Complexity): total of 3 or more elements  Clinical Presentation (PT Evaluation Complexity): stable  Clinical Decision Making (PT Evaluation Complexity): low complexity  Overall Complexity (PT Evaluation Complexity): low complexity     PT Charges       Row Name 03/21/25 1023             Time Calculation    Start Time 0901  -      Stop Time 0920  -      Time Calculation (min) 19 min  -      PT Received On 03/21/25  -      PT - Next Appointment 03/24/25  -         Time Calculation- PT    Total Timed Code Minutes- PT 19 minute(s)  -         Timed Charges    39710 - PT Therapeutic Exercise Minutes 5  -      11971 - Gait Training Minutes  9  -      32030 - PT Therapeutic Activity Minutes 5  -         Total Minutes    Timed Charges Total Minutes 19  -       Total Minutes 19  -                User Key  (r) = Recorded By, (t) = Taken By, (c) = Cosigned By      Initials Name Provider Type     Diana Morales, PT Physical Therapist                  Therapy Charges for Today       Code Description Service Date Service Provider Modifiers Qty    [id number]  GAIT TRAINING EA 15 MIN 3/21/2025 Diana Morales, PT GP 1            PT G-Codes  Outcome Measure Options: AM-PAC 6 Clicks Basic Mobility (PT)  AM-PAC 6 Clicks Score (PT): 17  AM-PAC 6 Clicks Score (OT): 16  Modified [place] Scale: 4 - Moderately severe disability.  Unable to walk without assistance, and unable to attend to own bodily needs without assistance.  PT Discharge Summary  Anticipated Discharge Disposition (PT): extended care facility    Diana Morales PT  3/21/2025

## 2025-03-21 NOTE — THERAPY TREATMENT NOTE
Patient Name: Tony Leonard  : 1940    MRN: [id number]                              Today's Date: 3/21/2025       Admit Date: 3/18/2025    Visit Dx:     ICD-10-CM ICD-9-CM   1. Pneumonia of left lower lobe due to infectious organism  J18.9 486   2. Decreased activities of daily living (ADL)  Z78.9 V49.89     Patient Active Problem List   Diagnosis    Thrush, oral    ADD (attention deficit disorder)    Hemorrhoids    Bronchiectasis with acute lower respiratory infection    Diverticul disease small and large intestine, no perforati or abscess    Benign non-nodular prostatic hyperplasia without lower urinary tract symptoms    Gastroesophageal reflux disease    Malaise and fatigue    GERD (gastroesophageal reflux disease)    Environmental allergies    Hemoptysis    Dyslipidemia    Primary osteoarthritis involving multiple joints    Pneumonia of right lower lobe due to infectious organism    Abnormal EKG    COPD (chronic obstructive pulmonary disease)    Mild malnutrition    Acute on chronic respiratory failure with hypoxia    Fracture, intertrochanteric, right femur, closed, initial encounter    Chronic diastolic CHF (congestive heart failure)    Hematoma of frontal scalp    Postoperative anemia due to acute blood loss    Urinary retention    Hemorrhagic disorder due to circulating anticoagulants    History of fracture of right hip    Closed fracture of right hip with routine healing    Chronic low back pain with sciatica    Change in bowel function    Rectal bleeding    Prostate cancer    On home oxygen therapy    Acute viral bronchitis    Neuropathy    Plantar fasciitis    HTN (hypertension)    Bilateral lower extremity edema    Microscopic hematuria    Acute midline low back pain with right-sided sciatica    Spinal stenosis, lumbar region with neurogenic claudication    Compression deformity of vertebra    Rectal bleed    Esophagitis    Angiectasia    Hiatal hernia    Overweight (BMI 25.0-29.9)     Leukocytosis    Bilateral hip pain    Elevated troponin level not due myocardial infarction    Nocturnal hypoxia    Pneumonia of right upper lobe due to infectious organism    NSTEMI (non-ST elevated myocardial infarction)    Chronic respiratory failure with hypoxia    Paroxysmal atrial fibrillation    Acute exacerbation of chronic obstructive pulmonary disease (COPD)    Anemia    Non-compliant patient    Hypokalemia    Spondylolisthesis of lumbar region    Elevated troponin    Rhabdomyolysis    Multiple contusions    Fall    Dizziness    Contusion of hip    Pulmonary embolism    COPD with acute exacerbation    Influenza A    Altered mental status    Pneumonia    Pneumonia due to E. coli     Past Medical History:   Diagnosis Date    ADHD (attention deficit hyperactivity disorder)     Altered mental status, unspecified     Anemia, unspecified     Attention-deficit hyperactivity disorder, unspecified type     Benign prostatic hyperplasia without lower urinary tract symptoms     Body mass index 26.0-26.9, adult     Bronchiectasis     Bronchiectasis, uncomplicated     Chest pain, unspecified     CHF (congestive heart failure)     Chronic diastolic (congestive) heart failure     Chronic respiratory failure with hypoxia     Cognitive communication deficit     Colon polyp     COPD (chronic obstructive pulmonary disease)     Depression, unspecified     Diaphragmatic hernia without obstruction or gangrene     Diverticulosis     Diverticulosis of both small and large intestine without perforation or abscess without bleeding     Dorsalgia, unspecified     Encounter for immunization     Eosinophilic asthma     Essential (primary) hypertension     Gastroesophageal reflux disease without esophagitis     Hard of hearing     Hyperlipidemia, unspecified     Hypertensive heart disease with congestive heart failure     Hypoxemia     Influenza due to other identified influenza virus with other respiratory manifestations     Insomnia,  unspecified     Localized edema     Mild protein-calorie malnutrition     Muscle weakness (generalized)     On home oxygen therapy     2 LITERS    Other specified abnormal findings of blood chemistry     Overweight     Paroxysmal atrial fibrillation     Personal history of malignant neoplasm of prostate     Pneumonia     Polyneuropathy, unspecified     Prostate cancer 04/22/2019    Restless leg syndrome     Sleep related hypoventilation in conditions classified elsewhere     Spinal stenosis, lumbar region with neurogenic claudication 08/02/2022    Spinal stenosis, lumbar region, with neurogenic claudication     Weakness      Past Surgical History:   Procedure Laterality Date    BRONCHOSCOPY N/A 04/30/2016    Procedure: BRONCHOSCOPY with BAL of right lower lobe and left  lower lobe.  ;  Surgeon: Nando Diaz MD;  Location: [facility] ENDOSCOPY;  Service:     BRONCHOSCOPY N/A 04/28/2017    Procedure: BRONCHOSCOPY;  Surgeon: Katharina Salter MD;  Location: [facility] ENDOSCOPY;  Service:     BRONCHOSCOPY Bilateral 06/29/2017    Procedure: BRONCHOSCOPY WITH WASHINGS;  Surgeon: Katharina Salter MD;  Location: [facility] ENDOSCOPY;  Service:     BRONCHOSCOPY N/A 01/22/2018    Procedure: BRONCHOSCOPY AT BEDSIDE with BAL;  Surgeon: Katharina Salter MD;  Location: [facility] ENDOSCOPY;  Service:     BRONCHOSCOPY N/A 01/30/2018    Procedure: BRONCHOSCOPY with BAL and washing;  Surgeon: Shreyas Wild MD;  Location: [facility] ENDOSCOPY;  Service:     BRONCHOSCOPY Bilateral 04/24/2018    Procedure: BRONCHOSCOPY WITH WASHING;  Surgeon: Katharina Salter MD;  Location: [facility] ENDOSCOPY;  Service: Pulmonary    BRONCHOSCOPY N/A 12/28/2019    Procedure: BRONCHOSCOPY with bilateral washing;  Surgeon: Katharina Salter MD;  Location: [facility] ENDOSCOPY;  Service: Pulmonary    BRONCHOSCOPY Bilateral 01/04/2023    Procedure: BRONCHOSCOPY with lavage;  Surgeon: Katharina Salter MD;  Location: [facility] ENDOSCOPY;  Service: Pulmonary;  Laterality: Bilateral;  mucus  plugging    CARDIAC CATHETERIZATION N/A 01/29/2023    Procedure: Left Heart Cath;  Surgeon: Johnnie Ang MD;  [facility];  Service: Cardiology;  Laterality: N/A;    CARDIAC CATHETERIZATION N/A 01/29/2023    Procedure: Coronary angiography;  Surgeon: Johnnie Ang MD;  [facility];  Service: Cardiology;  Laterality: N/A;    COLONOSCOPY  05/17/2013    eh, ih, tort, sig tics    COLONOSCOPY N/A 05/10/2019    Non-thrombosed external hemorrhoids found on perianal exam, Diverticulosis, Tortuous colon, One 5 mm polyp in the mid ascending colon, IH. Path: Tubular adenoma.     COLONOSCOPY N/A 09/24/2022    Procedure: COLONOSCOPY to cecum and TI with argon plasma coagulation.;  Surgeon: Bruce Black MD;  [facility];  Service: Gastroenterology;  Laterality: N/A;  pre- GI bleeding  post- radiation proctitis, diverticulosis    ENDOSCOPY  03/19/2015    z line irreg, hh    ENDOSCOPY N/A 09/24/2022    Procedure: ESOPHAGOGASTRODUODENOSCOPY;  Surgeon: Bruce Black MD;  [facility];  Service: Gastroenterology;  Laterality: N/A;  pre- GI bleeding  post- esophagitis, hiatal hernia    HIP OPEN REDUCTION Right 01/17/2018    Procedure: HIP OPEN REDUCTION INTERNAL FIXATION WITH DYNAMIC HIP SCREW;  Surgeon: eLs Black MD;  Location: [facility] MAIN OR;  Service:     SPINE SURGERY      TONSILLECTOMY      TOTAL HIP ARTHROPLASTY REVISION Right 09/23/2019    Procedure: HIP  REVISION RIGHT;  Surgeon: Isauro Warner MD;  Location: [facility] MAIN OR;  Service: Orthopedics      General Information       Row Name 03/21/25 0940          OT Time and Intention    Document Type therapy note (daily note)  -ES     Mode of Treatment co-treatment;physical therapy;occupational therapy  Patient presents with deficits impacting both mobility and functional independence in ADLs. The patient has multifaceted rehabilitation needs that require the  expertise and skilled hands of both physical and occupational therapy.  -ES     Patient Effort good  -ES       Row Name 03/21/25 0940          General Information    Existing Precautions/Restrictions fall;oxygen therapy device and L/min  -ES     Barriers to Rehab previous functional deficit  -ES       Row Name 03/21/25 0940          Cognition    Orientation Status (Cognition) oriented to;person;situation  patient cooperative, agreeable to therapy participation  -ES       Row Name 03/21/25 0940          Safety Issues/Impairments Affecting Functional Mobility    Impairments Affecting Function (Mobility) balance;endurance/activity tolerance;strength;pain  -ES               User Key  (r) = Recorded By, (t) = Taken By, (c) = Cosigned By      Initials Name Provider Type    ES Maryellen Dahl, OTR/L, CSRS Occupational Therapist                     Mobility/ADL's       Row Name 03/21/25 0942          Bed Mobility    Bed Mobility supine-sit  -ES     Supine-Sit [place] (Bed Mobility) minimum assist (75% patient effort);verbal cues  -ES     Comment, (Bed Mobility) cues for sequencing, increased time. Assist with trunk elevation to edge of bed  -ES       Row Name 03/21/25 0942          Sit-Stand Transfer    Sit-Stand [place] (Transfers) minimum assist (75% patient effort);2 person assist  -ES     Assistive Device (Sit-Stand Transfers) walker, front-wheeled  -ES     Comment, (Sit-Stand Transfer) cues for hand placement prior to transfer  -ES       Row Name 03/21/25 0942          Functional Mobility    Functional Mobility- Ind. Level minimum assist (75% patient effort);1 person + 1 person to manage equipment  -ES     Functional Mobility- Device walker, front-wheeled  -ES     Functional Mobility- Comment patient performed functional mobility to bathroom, sinkside, and to bed side recliner, min A with use of RW. Cues required for upright posturing and hand placement on rolling walker. Patient with limited reception to  cueing.  -ES       Row Name 03/21/25 0942          Activities of Daily Living    BADL Assessment/Intervention toileting;grooming  -ES       Row Name 03/21/25 0942          Toileting Assessment/Training    Hall Level (Toileting) toileting skills;perform perineal hygiene;adjust/manage clothing;maximum assist (25% patient effort)  -ES     Assistive Devices (Toileting) commode  -ES     Comment, (Toileting) patient declines to attepmt pericare, despite OT encouragement. Assist provided to doff soiled brief, perform pericare, and don clean brief  -ES       Row Name 03/21/25 0942          Grooming Assessment/Training    Hall Level (Grooming) grooming skills;wash face, hands;contact guard assist  -ES     Position (Grooming) sink side  -ES     Comment, (Grooming) hand hygiene in stance at sinkside. no physical assist provided, CGA for static standing balance at sinkside  -ES               User Key  (r) = Recorded By, (t) = Taken By, (c) = Cosigned By      Initials Name Provider Type    ES Maryellen Dahl, OTR/L, CSRS Occupational Therapist                   Obj/Interventions       Row Name 03/21/25 0947          Motor Skills    Motor Skills functional endurance  -ES     Functional Endurance poor. With toileting task, patient requires three seated rest breaks, tolerating short trials of standing ~30 seonds each during task engagement. Patient is able to complete functional mobility in room, O2 saturating 96% on NC, patient with deep breathing noted with all mobility and task engagement  -ES       Row Name 03/21/25 0947          Balance    Balance Assessment sitting static balance;standing static balance;standing dynamic balance  -ES     Comment, Balance Initial CGA seated EOB progressing to SBA with increased time and cueing for hand placement. Min A with transfers and mobility with use of RW. CGA in static stance at sinkside and toilet with ADL engagement  -ES               User Key  (r) = Recorded By, (t) =  Taken By, (c) = Cosigned By      Initials Name Provider Type    Maryellen De León, OTR/L, CSRS Occupational Therapist                   Goals/Plan    No documentation.                  Clinical Impression       Row Name 03/21/25 0948          Plan of Care Review    Plan of Care Reviewed With patient  -ES     Outcome Evaluation Patient with good participation in OT/PT cotreatment this AM. Therapeutic intervention focused on increasing patient task tolerance through completion of ADLs focused on increasing standing time with ADL participation. Patient tolerates toileting task, however requies 3 seated rest breaks with short task engagement, labored breathing noted. Patient continues to require assist with pericare and CGA with static standing balance at sinkside. Patient will benefit from continued skilled OT intervention to address above mentioned deficits.  -ES       Row Name 03/21/25 0948          Therapy Plan Review/Discharge Plan (OT)    Anticipated Discharge Disposition (OT) [facility]  -ES       Row Name 03/21/25 0948          Vital Signs    O2 Delivery Pre Treatment nasal cannula  -ES     O2 Delivery Intra Treatment nasal cannula  -ES     O2 Delivery Post Treatment nasal cannula  -ES       Row Name 03/21/25 0921          Positioning and Restraints    Pre-Treatment Position in bed  -ES     Post Treatment Position chair  -ES     In Chair sitting;with PT  -ES               User Key  (r) = Recorded By, (t) = Taken By, (c) = Cosigned By      Initials Name Provider Type    Maryellen De León, RAULR/L, CSRS Occupational Therapist                   Outcome Measures       Row Name 03/21/25 0951          How much help from another is currently needed...    Putting on and taking off regular lower body clothing? 2  -ES     Bathing (including washing, rinsing, and drying) 2  -ES     Toileting (which includes using toilet bed pan or urinal) 2  -ES     Putting on and taking off regular upper body clothing 3  -ES      Taking care of personal grooming (such as brushing teeth) 3  -ES     Eating meals 4  -ES     AM-PAC 6 Clicks Score (OT) 16  -ES       Row Name 03/21/25 0921 03/21/25 0000       How much help from another person do you currently need...    Turning from your back to your side while in flat bed without using bedrails? 3  -RW 3  -MS    Moving from lying on back to sitting on the side of a flat bed without bedrails? 3  -RW 3  -MS    Moving to and from a bed to a chair (including a wheelchair)? 3  -RW 3  -MS    Standing up from a chair using your arms (e.g., wheelchair, bedside chair)? 2  -RW 2  -MS    Climbing 3-5 steps with a railing? 2  -RW 2  -MS    To walk in hospital room? 3  -RW 3  -MS    AM-PAC 6 Clicks Score (PT) 16  -RW 16  -MS    Highest Level of Mobility Goal 5 --> Static standing  -RW 5 --> Static standing  -MS      Row Name 03/21/25 0951          Functional Assessment    Outcome Measure Options AM-PAC 6 Clicks Daily Activity (OT)  -ES               User Key  (r) = Recorded By, (t) = Taken By, (c) = Cosigned By      Initials Name Provider Type    RW Eric Ritter, RN Registered Nurse    Gail Montiel RN Registered Nurse    Maryellen De León, OTR/L, CSRS Occupational Therapist                    Occupational Therapy Education       Title: PT OT SLP Therapies (In Progress)       Topic: Occupational Therapy (Done)       Point: ADL training (Done)       Learning Progress Summary            Patient Acceptance, E, VU by HE at 3/20/2025 1320    Comment: Pt educated on role of OT, goals of session, discharge recommendations.                      Point: Home exercise program (Done)       Learning Progress Summary            Patient Acceptance, E, VU by HE at 3/20/2025 1320    Comment: Pt educated on role of OT, goals of session, discharge recommendations.                      Point: Precautions (Done)       Learning Progress Summary            Patient Acceptance, E, VU by HE at 3/20/2025 1320    Comment: Pt educated  on role of OT, goals of session, discharge recommendations.                      Point: Body mechanics (Done)       Learning Progress Summary            Patient Acceptance, E, VU by HE at 3/20/2025 1320    Comment: Pt educated on role of OT, goals of session, discharge recommendations.                                      User Key       Initials Effective Dates Name Provider Type Discipline    HE 02/18/25 -  Cheryl Pastor OT Occupational Therapist OT                  OT Recommendation and Plan     Plan of Care Review  Plan of Care Reviewed With: patient  Outcome Evaluation: Patient with good participation in OT/PT cotreatment this AM. Therapeutic intervention focused on increasing patient task tolerance through completion of ADLs focused on increasing standing time with ADL participation. Patient tolerates toileting task, however requies 3 seated rest breaks with short task engagement, labored breathing noted. Patient continues to require assist with pericare and CGA with static standing balance at sinkside. Patient will benefit from continued skilled OT intervention to address above mentioned deficits.     Time Calculation:         Time Calculation- OT       Row Name 03/21/25 0952             Time Calculation- OT    OT Start Time 0906  -ES      OT Stop Time 0918  -ES      OT Time Calculation (min) 12 min  -ES      Total Timed Code Minutes- OT 12 minute(s)  -ES      OT Received On 03/21/25  -ES      OT - Next Appointment 03/24/25  -ES      OT Goal Re-Cert Due Date 04/04/25  -ES         Timed Charges    05218 - OT Self Care/Mgmt Minutes 12  -ES         Total Minutes    Timed Charges Total Minutes 12  -ES       Total Minutes 12  -ES                User Key  (r) = Recorded By, (t) = Taken By, (c) = Cosigned By      Initials Name Provider Type    ES Maryellen Dahl, OTR/L, CSRS Occupational Therapist                  Therapy Charges for Today       Code Description Service Date Service Provider Modifiers Qty     [id number]  OT SELF CARE/MGMT/TRAIN EA 15 MIN 3/21/2025 Maryellen Dahl, RAULR/L, CSRS GO 1                 JEREMI Moyer/L, CSRS  3/21/2025

## 2025-03-23 LAB
BACTERIA SPEC AEROBE CULT: NORMAL
BACTERIA SPEC AEROBE CULT: NORMAL

## 2025-06-01 ENCOUNTER — APPOINTMENT (OUTPATIENT)
Dept: GENERAL RADIOLOGY | Facility: HOSPITAL | Age: 85
End: 2025-06-01
Payer: MEDICARE

## 2025-06-01 ENCOUNTER — HOSPITAL ENCOUNTER (INPATIENT)
Facility: HOSPITAL | Age: 85
LOS: 3 days | Discharge: NURSING, REHAB OR LONG-TERM CARE | End: 2025-06-04
Attending: EMERGENCY MEDICINE | Admitting: INTERNAL MEDICINE
Payer: MEDICARE

## 2025-06-01 ENCOUNTER — APPOINTMENT (OUTPATIENT)
Dept: CT IMAGING | Facility: HOSPITAL | Age: 85
End: 2025-06-01
Payer: MEDICARE

## 2025-06-01 DIAGNOSIS — R10.84 GENERALIZED ABDOMINAL PAIN: Primary | ICD-10-CM

## 2025-06-01 DIAGNOSIS — G62.9 NEUROPATHY: ICD-10-CM

## 2025-06-01 DIAGNOSIS — E86.1 HYPOTENSION DUE TO HYPOVOLEMIA: ICD-10-CM

## 2025-06-01 DIAGNOSIS — J96.11 CHRONIC RESPIRATORY FAILURE WITH HYPOXIA: ICD-10-CM

## 2025-06-01 LAB
ALBUMIN SERPL-MCNC: 3.4 G/DL (ref 3.5–5.2)
ALBUMIN/GLOB SERPL: 1 G/DL
ALP SERPL-CCNC: 86 U/L (ref 39–117)
ALT SERPL W P-5'-P-CCNC: 8 U/L (ref 1–41)
ANION GAP SERPL CALCULATED.3IONS-SCNC: 18 MMOL/L (ref 5–15)
AST SERPL-CCNC: 15 U/L (ref 1–40)
BASOPHILS # BLD AUTO: 0.02 10*3/MM3 (ref 0–0.2)
BASOPHILS NFR BLD AUTO: 0.4 % (ref 0–1.5)
BILIRUB SERPL-MCNC: 0.5 MG/DL (ref 0–1.2)
BILIRUB UR QL STRIP: NEGATIVE
BUN SERPL-MCNC: 11 MG/DL (ref 8–23)
BUN/CREAT SERPL: 12.4 (ref 7–25)
CALCIUM SPEC-SCNC: 9.7 MG/DL (ref 8.6–10.5)
CHLORIDE SERPL-SCNC: 96 MMOL/L (ref 98–107)
CLARITY UR: CLEAR
CO2 SERPL-SCNC: 23 MMOL/L (ref 22–29)
COLOR UR: YELLOW
CREAT SERPL-MCNC: 0.89 MG/DL (ref 0.76–1.27)
D-LACTATE SERPL-SCNC: 1.3 MMOL/L (ref 0.5–2)
DEPRECATED RDW RBC AUTO: 42.5 FL (ref 37–54)
EGFRCR SERPLBLD CKD-EPI 2021: 82.9 ML/MIN/1.73
EOSINOPHIL # BLD AUTO: 0 10*3/MM3 (ref 0–0.4)
EOSINOPHIL NFR BLD AUTO: 0 % (ref 0.3–6.2)
ERYTHROCYTE [DISTWIDTH] IN BLOOD BY AUTOMATED COUNT: 12.2 % (ref 12.3–15.4)
GEN 5 1HR TROPONIN T REFLEX: 26 NG/L
GLOBULIN UR ELPH-MCNC: 3.3 GM/DL
GLUCOSE SERPL-MCNC: 86 MG/DL (ref 65–99)
GLUCOSE UR STRIP-MCNC: NEGATIVE MG/DL
HCT VFR BLD AUTO: 44.2 % (ref 37.5–51)
HGB BLD-MCNC: 14.9 G/DL (ref 13–17.7)
HGB UR QL STRIP.AUTO: NEGATIVE
HOLD SPECIMEN: NORMAL
HOLD SPECIMEN: NORMAL
IMM GRANULOCYTES # BLD AUTO: 0.02 10*3/MM3 (ref 0–0.05)
IMM GRANULOCYTES NFR BLD AUTO: 0.4 % (ref 0–0.5)
INR PPP: 1.19 (ref 0.9–1.1)
KETONES UR QL STRIP: ABNORMAL
LEUKOCYTE ESTERASE UR QL STRIP.AUTO: NEGATIVE
LIPASE SERPL-CCNC: 12 U/L (ref 13–60)
LYMPHOCYTES # BLD AUTO: 2.14 10*3/MM3 (ref 0.7–3.1)
LYMPHOCYTES NFR BLD AUTO: 37.8 % (ref 19.6–45.3)
MAGNESIUM SERPL-MCNC: 1.7 MG/DL (ref 1.6–2.4)
MCH RBC QN AUTO: 31.7 PG (ref 26.6–33)
MCHC RBC AUTO-ENTMCNC: 33.7 G/DL (ref 31.5–35.7)
MCV RBC AUTO: 94 FL (ref 79–97)
MONOCYTES # BLD AUTO: 0.56 10*3/MM3 (ref 0.1–0.9)
MONOCYTES NFR BLD AUTO: 9.9 % (ref 5–12)
NEUTROPHILS NFR BLD AUTO: 2.92 10*3/MM3 (ref 1.7–7)
NEUTROPHILS NFR BLD AUTO: 51.5 % (ref 42.7–76)
NITRITE UR QL STRIP: NEGATIVE
NRBC BLD AUTO-RTO: 0 /100 WBC (ref 0–0.2)
NT-PROBNP SERPL-MCNC: 144 PG/ML (ref 0–1800)
PH UR STRIP.AUTO: <=5 [PH] (ref 5–8)
PLATELET # BLD AUTO: 289 10*3/MM3 (ref 140–450)
PMV BLD AUTO: 8.8 FL (ref 6–12)
POTASSIUM SERPL-SCNC: 3.4 MMOL/L (ref 3.5–5.2)
PROCALCITONIN SERPL-MCNC: 0.06 NG/ML (ref 0–0.25)
PROT SERPL-MCNC: 6.7 G/DL (ref 6–8.5)
PROT UR QL STRIP: NEGATIVE
PROTHROMBIN TIME: 15 SECONDS (ref 11.7–14.2)
RBC # BLD AUTO: 4.7 10*6/MM3 (ref 4.14–5.8)
SODIUM SERPL-SCNC: 137 MMOL/L (ref 136–145)
SP GR UR STRIP: 1.01 (ref 1–1.03)
TROPONIN T % DELTA: -7
TROPONIN T NUMERIC DELTA: -2 NG/L
TROPONIN T SERPL HS-MCNC: 28 NG/L
UROBILINOGEN UR QL STRIP: ABNORMAL
WBC NRBC COR # BLD AUTO: 5.66 10*3/MM3 (ref 3.4–10.8)
WHOLE BLOOD HOLD COAG: NORMAL
WHOLE BLOOD HOLD SPECIMEN: NORMAL

## 2025-06-01 PROCEDURE — 25810000003 SODIUM CHLORIDE 0.9 % SOLUTION: Performed by: EMERGENCY MEDICINE

## 2025-06-01 PROCEDURE — 99285 EMERGENCY DEPT VISIT HI MDM: CPT

## 2025-06-01 PROCEDURE — 83605 ASSAY OF LACTIC ACID: CPT | Performed by: EMERGENCY MEDICINE

## 2025-06-01 PROCEDURE — 36415 COLL VENOUS BLD VENIPUNCTURE: CPT

## 2025-06-01 PROCEDURE — 83880 ASSAY OF NATRIURETIC PEPTIDE: CPT | Performed by: EMERGENCY MEDICINE

## 2025-06-01 PROCEDURE — 94799 UNLISTED PULMONARY SVC/PX: CPT

## 2025-06-01 PROCEDURE — 85610 PROTHROMBIN TIME: CPT | Performed by: EMERGENCY MEDICINE

## 2025-06-01 PROCEDURE — 83690 ASSAY OF LIPASE: CPT | Performed by: EMERGENCY MEDICINE

## 2025-06-01 PROCEDURE — 80053 COMPREHEN METABOLIC PANEL: CPT | Performed by: EMERGENCY MEDICINE

## 2025-06-01 PROCEDURE — 94640 AIRWAY INHALATION TREATMENT: CPT

## 2025-06-01 PROCEDURE — 85025 COMPLETE CBC W/AUTO DIFF WBC: CPT | Performed by: EMERGENCY MEDICINE

## 2025-06-01 PROCEDURE — 25810000003 SODIUM CHLORIDE 0.9 % SOLUTION: Performed by: INTERNAL MEDICINE

## 2025-06-01 PROCEDURE — 83735 ASSAY OF MAGNESIUM: CPT | Performed by: EMERGENCY MEDICINE

## 2025-06-01 PROCEDURE — 74177 CT ABD & PELVIS W/CONTRAST: CPT

## 2025-06-01 PROCEDURE — 93005 ELECTROCARDIOGRAM TRACING: CPT | Performed by: EMERGENCY MEDICINE

## 2025-06-01 PROCEDURE — 94761 N-INVAS EAR/PLS OXIMETRY MLT: CPT

## 2025-06-01 PROCEDURE — 93010 ELECTROCARDIOGRAM REPORT: CPT | Performed by: INTERNAL MEDICINE

## 2025-06-01 PROCEDURE — 25510000001 IOPAMIDOL 61 % SOLUTION: Performed by: EMERGENCY MEDICINE

## 2025-06-01 PROCEDURE — 84484 ASSAY OF TROPONIN QUANT: CPT | Performed by: EMERGENCY MEDICINE

## 2025-06-01 PROCEDURE — 71045 X-RAY EXAM CHEST 1 VIEW: CPT

## 2025-06-01 PROCEDURE — 81003 URINALYSIS AUTO W/O SCOPE: CPT | Performed by: EMERGENCY MEDICINE

## 2025-06-01 PROCEDURE — 84145 PROCALCITONIN (PCT): CPT | Performed by: EMERGENCY MEDICINE

## 2025-06-01 RX ORDER — ONDANSETRON 2 MG/ML
4 INJECTION INTRAMUSCULAR; INTRAVENOUS EVERY 6 HOURS PRN
Status: DISCONTINUED | OUTPATIENT
Start: 2025-06-01 | End: 2025-06-04 | Stop reason: HOSPADM

## 2025-06-01 RX ORDER — GABAPENTIN 300 MG/1
300 CAPSULE ORAL 3 TIMES DAILY
Status: DISCONTINUED | OUTPATIENT
Start: 2025-06-02 | End: 2025-06-04 | Stop reason: HOSPADM

## 2025-06-01 RX ORDER — POLYETHYLENE GLYCOL 3350 17 G/17G
17 POWDER, FOR SOLUTION ORAL DAILY PRN
Status: DISCONTINUED | OUTPATIENT
Start: 2025-06-01 | End: 2025-06-04 | Stop reason: HOSPADM

## 2025-06-01 RX ORDER — BISACODYL 5 MG/1
5 TABLET, DELAYED RELEASE ORAL DAILY PRN
Status: DISCONTINUED | OUTPATIENT
Start: 2025-06-01 | End: 2025-06-04 | Stop reason: HOSPADM

## 2025-06-01 RX ORDER — MULTIPLE VITAMINS W/ MINERALS TAB 9MG-400MCG
1 TAB ORAL DAILY
Status: DISCONTINUED | OUTPATIENT
Start: 2025-06-02 | End: 2025-06-04 | Stop reason: HOSPADM

## 2025-06-01 RX ORDER — IOPAMIDOL 612 MG/ML
100 INJECTION, SOLUTION INTRAVASCULAR
Status: COMPLETED | OUTPATIENT
Start: 2025-06-01 | End: 2025-06-01

## 2025-06-01 RX ORDER — SODIUM CHLORIDE FOR INHALATION 7 %
4 VIAL, NEBULIZER (ML) INHALATION EVERY 4 HOURS PRN
Status: DISCONTINUED | OUTPATIENT
Start: 2025-06-01 | End: 2025-06-04 | Stop reason: HOSPADM

## 2025-06-01 RX ORDER — GABAPENTIN 300 MG/1
300 CAPSULE ORAL 3 TIMES DAILY
Status: ON HOLD | COMMUNITY
End: 2025-06-04

## 2025-06-01 RX ORDER — GUAIFENESIN 600 MG/1
1200 TABLET, EXTENDED RELEASE ORAL 2 TIMES DAILY
Status: DISCONTINUED | OUTPATIENT
Start: 2025-06-02 | End: 2025-06-04 | Stop reason: HOSPADM

## 2025-06-01 RX ORDER — ARFORMOTEROL TARTRATE 15 UG/2ML
15 SOLUTION RESPIRATORY (INHALATION)
Status: DISCONTINUED | OUTPATIENT
Start: 2025-06-02 | End: 2025-06-04 | Stop reason: HOSPADM

## 2025-06-01 RX ORDER — IPRATROPIUM BROMIDE AND ALBUTEROL SULFATE 2.5; .5 MG/3ML; MG/3ML
3 SOLUTION RESPIRATORY (INHALATION)
Status: DISCONTINUED | OUTPATIENT
Start: 2025-06-02 | End: 2025-06-04 | Stop reason: HOSPADM

## 2025-06-01 RX ORDER — ALBUTEROL SULFATE 0.83 MG/ML
2.5 SOLUTION RESPIRATORY (INHALATION) EVERY 6 HOURS PRN
Status: DISCONTINUED | OUTPATIENT
Start: 2025-06-01 | End: 2025-06-04 | Stop reason: HOSPADM

## 2025-06-01 RX ORDER — IPRATROPIUM BROMIDE AND ALBUTEROL SULFATE 2.5; .5 MG/3ML; MG/3ML
3 SOLUTION RESPIRATORY (INHALATION)
COMMUNITY
Start: 2025-05-23

## 2025-06-01 RX ORDER — PANTOPRAZOLE SODIUM 40 MG/10ML
40 INJECTION, POWDER, LYOPHILIZED, FOR SOLUTION INTRAVENOUS
Status: DISCONTINUED | OUTPATIENT
Start: 2025-06-02 | End: 2025-06-04 | Stop reason: HOSPADM

## 2025-06-01 RX ORDER — ACETAMINOPHEN 650 MG/1
650 SUPPOSITORY RECTAL EVERY 4 HOURS PRN
Status: DISCONTINUED | OUTPATIENT
Start: 2025-06-01 | End: 2025-06-04 | Stop reason: HOSPADM

## 2025-06-01 RX ORDER — SODIUM CHLORIDE 9 MG/ML
75 INJECTION, SOLUTION INTRAVENOUS CONTINUOUS
Status: ACTIVE | OUTPATIENT
Start: 2025-06-02 | End: 2025-06-03

## 2025-06-01 RX ORDER — POTASSIUM CHLORIDE 1.5 G/1.58G
20 POWDER, FOR SOLUTION ORAL DAILY
Status: DISCONTINUED | OUTPATIENT
Start: 2025-06-02 | End: 2025-06-04 | Stop reason: HOSPADM

## 2025-06-01 RX ORDER — BISACODYL 10 MG
10 SUPPOSITORY, RECTAL RECTAL DAILY PRN
Status: DISCONTINUED | OUTPATIENT
Start: 2025-06-01 | End: 2025-06-04 | Stop reason: HOSPADM

## 2025-06-01 RX ORDER — BUDESONIDE 0.5 MG/2ML
0.5 INHALANT ORAL
Status: DISCONTINUED | OUTPATIENT
Start: 2025-06-02 | End: 2025-06-04 | Stop reason: HOSPADM

## 2025-06-01 RX ORDER — ONDANSETRON 4 MG/1
4 TABLET, ORALLY DISINTEGRATING ORAL EVERY 6 HOURS PRN
Status: DISCONTINUED | OUTPATIENT
Start: 2025-06-01 | End: 2025-06-04 | Stop reason: HOSPADM

## 2025-06-01 RX ORDER — SODIUM CHLORIDE 0.9 % (FLUSH) 0.9 %
10 SYRINGE (ML) INJECTION AS NEEDED
Status: DISCONTINUED | OUTPATIENT
Start: 2025-06-01 | End: 2025-06-04 | Stop reason: HOSPADM

## 2025-06-01 RX ORDER — ACETAMINOPHEN 160 MG/5ML
650 SOLUTION ORAL EVERY 4 HOURS PRN
Status: DISCONTINUED | OUTPATIENT
Start: 2025-06-01 | End: 2025-06-04 | Stop reason: HOSPADM

## 2025-06-01 RX ORDER — SODIUM CHLORIDE 9 MG/ML
75 INJECTION, SOLUTION INTRAVENOUS CONTINUOUS
Status: ACTIVE | OUTPATIENT
Start: 2025-06-01 | End: 2025-06-02

## 2025-06-01 RX ORDER — AMOXICILLIN 250 MG
2 CAPSULE ORAL 2 TIMES DAILY PRN
Status: DISCONTINUED | OUTPATIENT
Start: 2025-06-01 | End: 2025-06-04 | Stop reason: HOSPADM

## 2025-06-01 RX ORDER — ACETAMINOPHEN 325 MG/1
650 TABLET ORAL EVERY 4 HOURS PRN
Status: DISCONTINUED | OUTPATIENT
Start: 2025-06-01 | End: 2025-06-04 | Stop reason: HOSPADM

## 2025-06-01 RX ORDER — FERROUS SULFATE 325(65) MG
325 TABLET ORAL
Status: DISCONTINUED | OUTPATIENT
Start: 2025-06-02 | End: 2025-06-04 | Stop reason: HOSPADM

## 2025-06-01 RX ADMIN — SODIUM CHLORIDE 1000 ML: 9 INJECTION, SOLUTION INTRAVENOUS at 13:50

## 2025-06-01 RX ADMIN — SODIUM CHLORIDE 75 ML/HR: 9 INJECTION, SOLUTION INTRAVENOUS at 20:50

## 2025-06-01 RX ADMIN — Medication 2.5 MG: at 23:27

## 2025-06-01 RX ADMIN — BUDESONIDE 0.5 MG: 0.5 INHALANT RESPIRATORY (INHALATION) at 23:51

## 2025-06-01 RX ADMIN — ARFORMOTEROL TARTRATE 15 MCG: 15 SOLUTION RESPIRATORY (INHALATION) at 23:54

## 2025-06-01 RX ADMIN — IPRATROPIUM BROMIDE AND ALBUTEROL SULFATE 3 ML: .5; 3 SOLUTION RESPIRATORY (INHALATION) at 23:47

## 2025-06-01 RX ADMIN — IOPAMIDOL 85 ML: 612 INJECTION, SOLUTION INTRAVENOUS at 14:19

## 2025-06-01 NOTE — ED NOTES
Patient c/o generalized abdominal pain that radiates up to his chest, n/v x 3 days. Patient found to be covered in urine. Patient c/o rash on both of his thighs.

## 2025-06-01 NOTE — ED NOTES
Nursing report ED to floor  Tony Leonard  85 y.o.  male    HPI :  HPI  Stated Reason for Visit: soa rash and right hip pain  History Obtained From: EMS, patient    Chief Complaint  Chief Complaint   Patient presents with    Shortness of Breath       Admitting doctor:   Elizabet Parra MD    Admitting diagnosis:   The primary encounter diagnosis was Generalized abdominal pain. Diagnoses of Hypotension due to hypovolemia and Chronic respiratory failure with hypoxia were also pertinent to this visit.    Code status:   Current Code Status       Date Active Code Status Order ID Comments User Context       Prior            Allergies:   Daliresp [roflumilast], Latex, and Sulfa antibiotics    Isolation:   No active isolations    Intake and Output  No intake or output data in the 24 hours ending 06/01/25 1552    Weight:   There were no vitals filed for this visit.    Most recent vitals:   Vitals:    06/01/25 1344 06/01/25 1351 06/01/25 1456 06/01/25 1521   BP: 90/73 104/59 129/76 127/71   BP Location: Left arm  Left arm    Patient Position: Lying  Lying    Pulse: 93 96 85 87   Resp: 18 18 18    Temp:       TempSrc:       SpO2: 93% 97% 96% 98%       Active LDAs/IV Access:   Lines, Drains & Airways       Active LDAs       Name Placement date Placement time Site Days    Peripheral IV 06/01/25 1258 22 G Left;Posterior Hand 06/01/25  1258  Hand  less than 1    Peripheral IV 06/01/25 1403 20 G Anterior;Right Forearm 06/01/25  1403  Forearm  less than 1                    Labs (abnormal labs have a star):   Labs Reviewed   COMPREHENSIVE METABOLIC PANEL - Abnormal; Notable for the following components:       Result Value    Potassium 3.4 (*)     Chloride 96 (*)     Albumin 3.4 (*)     Anion Gap 18.0 (*)     All other components within normal limits    Narrative:     GFR Categories in Chronic Kidney Disease (CKD)              GFR Category          GFR (mL/min/1.73)    Interpretation  G1                    90 or greater         Normal or high (1)  G2                    60-89                Mild decrease (1)  G3a                   45-59                Mild to moderate decrease  G3b                   30-44                Moderate to severe decrease  G4                    15-29                Severe decrease  G5                    14 or less           Kidney failure    (1)In the absence of evidence of kidney disease, neither GFR category G1 or G2 fulfill the criteria for CKD.    eGFR calculation 2021 CKD-EPI creatinine equation, which does not include race as a factor   LIPASE - Abnormal; Notable for the following components:    Lipase 12 (*)     All other components within normal limits   TROPONIN - Abnormal; Notable for the following components:    HS Troponin T 28 (*)     All other components within normal limits    Narrative:     High Sensitive Troponin T Reference Range:  <14.0 ng/L- Negative Female for AMI  <22.0 ng/L- Negative Male for AMI  >=14 - Abnormal Female indicating possible myocardial injury.  >=22 - Abnormal Male indicating possible myocardial injury.   Clinicians would have to utilize clinical acumen, EKG, Troponin, and serial changes to determine if it is an Acute Myocardial Infarction or myocardial injury due to an underlying chronic condition.        URINALYSIS W/ MICROSCOPIC IF INDICATED (NO CULTURE) - Abnormal; Notable for the following components:    Ketones, UA 40 mg/dL (2+) (*)     All other components within normal limits    Narrative:     Urine microscopic not indicated.   CBC WITH AUTO DIFFERENTIAL - Abnormal; Notable for the following components:    RDW 12.2 (*)     Eosinophil % 0.0 (*)     All other components within normal limits   PROTIME-INR - Abnormal; Notable for the following components:    Protime 15.0 (*)     INR 1.19 (*)     All other components within normal limits   HIGH SENSITIVITIY TROPONIN T 1HR - Abnormal; Notable for the following components:    HS Troponin T 26 (*)     All other components  within normal limits    Narrative:     High Sensitive Troponin T Reference Range:  <14.0 ng/L- Negative Female for AMI  <22.0 ng/L- Negative Male for AMI  >=14 - Abnormal Female indicating possible myocardial injury.  >=22 - Abnormal Male indicating possible myocardial injury.   Clinicians would have to utilize clinical acumen, EKG, Troponin, and serial changes to determine if it is an Acute Myocardial Infarction or myocardial injury due to an underlying chronic condition.        BNP (IN-HOUSE) - Normal    Narrative:     This assay is used as an aid in the diagnosis of individuals suspected of having heart failure. It can be used as an aid in the diagnosis of acute decompensated heart failure (ADHF) in patients presenting with signs and symptoms of ADHF to the emergency department (ED). In addition, NT-proBNP of <300 pg/mL indicates ADHF is not likely.    Age Range Result Interpretation  NT-proBNP Concentration (pg/mL:      <50             Positive            >450                   Gray                 300-450                    Negative             <300    50-75           Positive            >900                  Gray                300-900                  Negative            <300      >75             Positive            >1800                  Gray                300-1800                  Negative            <300   LACTIC ACID, PLASMA - Normal   MAGNESIUM - Normal   PROCALCITONIN - Normal    Narrative:     As a Marker for Sepsis (Non-Neonates):    1. <0.5 ng/mL represents a low risk of severe sepsis and/or septic shock.  2. >2 ng/mL represents a high risk of severe sepsis and/or septic shock.    As a Marker for Lower Respiratory Tract Infections that require antibiotic therapy:    PCT on Admission    Antibiotic Therapy       6-12 Hrs later    >0.5                Strongly Recommended  >0.25 - <0.5        Recommended   0.1 - 0.25          Discouraged              Remeasure/reassess PCT  <0.1                Strongly  "Discouraged     Remeasure/reassess PCT    As 28 day mortality risk marker: \"Change in Procalcitonin Result\" (>80% or <=80%) if Day 0 (or Day 1) and Day 4 values are available. Refer to http://[email or web address]-pct-[email or web address]    Change in PCT <=80%  A decrease of PCT levels below or equal to 80% defines a positive change in PCT test result representing a higher risk for 28-day all-cause mortality of patients diagnosed with severe sepsis for septic shock.    Change in PCT >80%  A decrease of PCT levels of more than 80% defines a negative change in PCT result representing a lower risk for 28-day all-cause mortality of patients diagnosed with severe sepsis or septic shock.      RAINBOW DRAW    Narrative:     The following orders were created for panel order [facility] Draw.  Procedure                               Abnormality         Status                     ---------                               -----------         ------                     Green Top (Gel)[[id number]]                                  Final result               Lavender Top[[id number]]                                     Final result               Gold Top - SST[[id number]]                                   Final result               Light Blue Top[[id number]]                                   Final result                 Please view results for these tests on the individual orders.   CBC AND DIFFERENTIAL    Narrative:     The following orders were created for panel order CBC & Differential.  Procedure                               Abnormality         Status                     ---------                               -----------         ------                     CBC Auto Differential[[id number]]        Abnormal            Final result                 Please view results for these tests on the individual orders.   GREEN TOP   LAVENDER TOP   GOLD TOP - SST   LIGHT BLUE TOP       EKG:   ECG 12 Lead ED Triage Standing Order; Abdominal Pain (Upper)   Preliminary " Result   HEART RATE=85  bpm   RR Jfkmplgi=764  ms   FL Prguljer=324  ms   P Horizontal Axis=-7  deg   P Front Axis=62  deg   QRSD Interval=84  ms   QT Zldddjdw=716  ms   RXkR=404  ms   QRS Axis=-15  deg   T Wave Axis=43  deg   - OTHERWISE NORMAL ECG -   Sinus rhythm   Ventricular premature complex   Borderline left axis deviation   Abnormal R-wave progression, early transition   When compared with ECG of 28-Feb-2025 09:53:49,   Nonspecific significant change   Date and Time of Study:2025-06-01 12:09:39          Meds given in ED:   Medications   sodium chloride 0.9 % flush 10 mL (has no administration in time range)   sodium chloride 0.9 % flush 10 mL (has no administration in time range)   sodium chloride 0.9 % bolus 1,000 mL ( Intravenous Currently Infusing 6/1/25 9566)   iopamidol (ISOVUE-300) 61 % injection 100 mL (85 mL Intravenous Given by Other 6/1/25 4070)       Imaging results:  No radiology results for the last day    Ambulatory status:   - Generalized weakness - is at rehab facility for same    Social issues:   Social History     Socioeconomic History    Marital status: Single   Tobacco Use    Smoking status: Never    Smokeless tobacco: Never   Vaping Use    Vaping status: Never Used   Substance and Sexual Activity    Alcohol use: No     Comment: red wine occassional    Drug use: No    Sexual activity: Defer       Peripheral Neurovascular  Peripheral Neurovascular (Adult)  Peripheral Neurovascular WDL: WDL    Neuro Cognitive  Neuro Cognitive (Adult)  Cognitive/Neuro/Behavioral WDL: WDL    Learning  Learning Assessment  Learning Readiness and Ability: sensory deficit noted    Respiratory  Respiratory WDL  Respiratory WDL: WDL    Abdominal Pain       Pain Assessments  Pain (Adult)  (0-10) Pain Rating: Rest: 8  (0-10) Pain Rating: Activity: 8  Pain Location: hip  Pain Side/Orientation: right    NIH Stroke Scale       Ghislaine Lewis RN  06/01/25 15:52 EDT

## 2025-06-01 NOTE — ED PROVIDER NOTES
EMERGENCY DEPARTMENT ENCOUNTER    Room Number:  40/40  Date of encounter:  6/1/2025  PCP: Alfonso Goldstein MD  Historian: Patient and notes from [facility].  Relevant information and history provided by sources other than the patient will be included below and in the ED Course.  Review of pertinent past medical records may also be included in record below and ED Course.    HPI:  Chief Complaint: No appetite.  Not eaten in 3 days.  Not drink much liquid at all in 3 days.  Some generalized abdominal pain  A complete HPI/ROS/PMH/PSH/SH/FH are unobtainable due to: Patient is a little forgetful.  Context: Tony Leonard is a 85 y.o. male who presents to the ED c/o this patient comes here because he states that he has not eaten anything in 3 days.  He has been at [facility] for over a month.  He has no appetite.  He cannot even drink milk which is something he likes very much.  He has had a little bit of vomiting.  There is no blood in the vomit.  He did have a bowel movement this morning which was gray in color.  No black stool or blood in the stool.  He cannot localize where the abdominal pain is it is mild it is generalized.  He is unaware of having any previous abdominal surgery in the past.  He denies any change in his respiratory status.  He is chronically on 2 L denies any new shortness of breath at this time sitting in the bed denies shortness of breath denies chest pain.  He feels that he is got some pain in his right hip that is worse with movement but has been going on for a while.  He only at baseline can walk with assistance or with a walker for very short distances.  He does complain of some generalized weakness.  He denies any focal weakness.  Denies any headache.  I did review the medicine list from [facility] that they have there.  I did review the diagnosis.        Previous Episodes: Uncertain  Current Symptoms: See above    MEDICAL HISTORY  REVIEWED  I can see this patient was discharged from [facility] on March 21, 2025 he has a known history of anemia, COPD who is chronically on 2 L of oxygen.  He does have a history of congestive heart failure he did have earlier in the year influenza and superimposed pneumonia on that.  He was admitted to rehab and then came to the hospital on this admission for fever and was found to have a left lower lobe infiltrate that was concerning for pneumonia.  Pulmonology was consulted.  Patient also had feelings of probably chronic and atelectasis.  He was discharged home.  I did review his medicine list.  He also does have a history of paroxysmal atrial fibrillation but I do not see that he is anticoagulated at this time.  I reviewed the echo from December 22, 2023 Dr. Lawrence is his cardiologist an ejection fraction of 56 to 60% moderate dilatation of the aortic root was present.  He did also have a stress test in December 2023 which was essentially unremarkable and a low risk study.    PAST MEDICAL HISTORY  Active Ambulatory Problems     Diagnosis Date Noted    Thrush, oral 03/11/2016    ADD (attention deficit disorder) 03/11/2016    Hemorrhoids 03/11/2016    Bronchiectasis with acute lower respiratory infection 03/11/2016    Diverticul disease small and large intestine, no perforati or abscess 03/11/2016    Benign non-nodular prostatic hyperplasia without lower urinary tract symptoms 03/11/2016    Gastroesophageal reflux disease 03/11/2016    Malaise and fatigue 03/11/2016    GERD (gastroesophageal reflux disease) 04/25/2016    Environmental allergies 04/25/2016    Hemoptysis 04/27/2016    Dyslipidemia 05/11/2016    Primary osteoarthritis involving multiple joints 05/11/2016    Pneumonia of right lower lobe due to infectious organism 10/03/2016    Abnormal EKG 10/04/2016    COPD (chronic obstructive pulmonary disease) 10/04/2016    Mild malnutrition 03/28/2017    Acute on chronic respiratory  failure with hypoxia 04/27/2017    Fracture, intertrochanteric, right femur, closed, initial encounter 01/16/2018    Chronic diastolic CHF (congestive heart failure) 01/16/2018    Hematoma of frontal scalp 01/16/2018    Postoperative anemia due to acute blood loss 01/19/2018    Urinary retention 01/25/2018    Hemorrhagic disorder due to circulating anticoagulants 01/29/2018    History of fracture of right hip 02/22/2018    Closed fracture of right hip with routine healing 02/28/2018    Chronic low back pain with sciatica 06/21/2018    Change in bowel function 04/18/2019    Rectal bleeding 04/18/2019    Prostate cancer 04/22/2019    On home oxygen therapy 09/24/2019    Acute viral bronchitis 12/29/2019    Neuropathy 07/01/2021    Plantar fasciitis 09/08/2021    HTN (hypertension) 05/06/2022    Bilateral lower extremity edema 05/07/2022    Microscopic hematuria 05/08/2022    Acute midline low back pain with right-sided sciatica 08/01/2022    Spinal stenosis, lumbar region with neurogenic claudication 08/02/2022    Compression deformity of vertebra 08/02/2022    Rectal bleed 09/23/2022    Esophagitis 09/24/2022    Angiectasia 09/27/2022    Hiatal hernia 09/27/2022    Overweight (BMI 25.0-29.9) 11/14/2022    Leukocytosis 11/15/2022    Bilateral hip pain 11/21/2022    Elevated troponin level not due myocardial infarction 12/10/2022    Nocturnal hypoxia 12/10/2022    Pneumonia of right upper lobe due to infectious organism 12/29/2022    NSTEMI (non-ST elevated myocardial infarction) 01/29/2023    Chronic respiratory failure with hypoxia 01/29/2023    Paroxysmal atrial fibrillation 02/17/2023    Acute exacerbation of chronic obstructive pulmonary disease (COPD) 05/10/2023    Anemia 05/10/2023    Non-compliant patient 05/11/2023    Hypokalemia 10/01/2020    Spondylolisthesis of lumbar region 08/14/2018    Elevated troponin 12/22/2023    Rhabdomyolysis 12/26/2023    Multiple contusions 12/26/2023    Fall 12/26/2023     Dizziness 04/09/2024    Contusion of hip 04/09/2024    Pulmonary embolism 05/21/2024    COPD with acute exacerbation 11/25/2024    Influenza A 02/16/2025    Altered mental status 02/16/2025    Atelectasis of left lung 02/26/2025    Pneumonia due to E. coli 03/04/2025     Resolved Ambulatory Problems     Diagnosis Date Noted    Pneumonia of both lower lobes due to infectious organism 03/11/2016    Pneumonia of right upper lobe due to infectious organism 04/22/2016    COPD exacerbation 04/25/2016    Chronic respiratory failure with hypoxia 10/04/2016    Bacterial lobar pneumonia 12/27/2016    Pneumonia of left lower lobe due to infectious organism 03/26/2017    Bronchitis 06/01/2017    COPD exacerbation 06/17/2017    Leukocytosis 01/16/2018    Right upper lobe pneumonia 01/20/2018    Mucus plugging of bronchi 01/16/2018    COPD exacerbation 04/16/2018    Degenerative joint disease (DJD) of hip 09/23/2019    Bronchiectasis with (acute) exacerbation 12/28/2019    COPD exacerbation 05/07/2022    Septic shock 11/26/2022    Acute saddle pulmonary embolism without acute cor pulmonale  - 12/11/2022 12/11/2022    Chest pain 04/19/2024     Past Medical History:   Diagnosis Date    ADHD (attention deficit hyperactivity disorder)     Altered mental status, unspecified     Anemia, unspecified     Attention-deficit hyperactivity disorder, unspecified type     Benign prostatic hyperplasia without lower urinary tract symptoms     Body mass index 26.0-26.9, adult     Bronchiectasis     Bronchiectasis, uncomplicated     Chest pain, unspecified     CHF (congestive heart failure)     Chronic diastolic (congestive) heart failure     Cognitive communication deficit     Colon polyp     Depression, unspecified     Diaphragmatic hernia without obstruction or gangrene     Diverticulosis     Diverticulosis of both small and large intestine without perforation or abscess without bleeding     Dorsalgia, unspecified     Encounter for immunization      Eosinophilic asthma     Essential (primary) hypertension     Gastroesophageal reflux disease without esophagitis     Hard of hearing     Hyperlipidemia, unspecified     Hypertensive heart disease with congestive heart failure     Hypoxemia     Influenza due to other identified influenza virus with other respiratory manifestations     Insomnia, unspecified     Localized edema     Mild protein-calorie malnutrition     Muscle weakness (generalized)     Other specified abnormal findings of blood chemistry     Overweight     Personal history of malignant neoplasm of prostate     Pneumonia     Polyneuropathy, unspecified     Restless leg syndrome     Sleep related hypoventilation in conditions classified elsewhere     Spinal stenosis, lumbar region, with neurogenic claudication     Weakness          PAST SURGICAL HISTORY  Past Surgical History:   Procedure Laterality Date    BRONCHOSCOPY N/A 04/30/2016    Procedure: BRONCHOSCOPY with BAL of right lower lobe and left  lower lobe.  ;  Surgeon: Nando Diaz MD;  Location: [facility] ENDOSCOPY;  Service:     BRONCHOSCOPY N/A 04/28/2017    Procedure: BRONCHOSCOPY;  Surgeon: Katharina Salter MD;  Location: [facility] ENDOSCOPY;  Service:     BRONCHOSCOPY Bilateral 06/29/2017    Procedure: BRONCHOSCOPY WITH WASHINGS;  Surgeon: Katharina Salter MD;  Location: [facility] ENDOSCOPY;  Service:     BRONCHOSCOPY N/A 01/22/2018    Procedure: BRONCHOSCOPY AT BEDSIDE with BAL;  Surgeon: Katharina Salter MD;  Location: [facility] ENDOSCOPY;  Service:     BRONCHOSCOPY N/A 01/30/2018    Procedure: BRONCHOSCOPY with BAL and washing;  Surgeon: Shreyas Wild MD;  Location: [facility] ENDOSCOPY;  Service:     BRONCHOSCOPY Bilateral 04/24/2018    Procedure: BRONCHOSCOPY WITH WASHING;  Surgeon: Katharina Salter MD;  Location: [facility] ENDOSCOPY;  Service: Pulmonary    BRONCHOSCOPY N/A 12/28/2019    Procedure: BRONCHOSCOPY with bilateral washing;  Surgeon: Katharina Salter MD;  Location: [facility] ENDOSCOPY;  Service:  Pulmonary    BRONCHOSCOPY Bilateral 01/04/2023    Procedure: BRONCHOSCOPY with lavage;  Surgeon: Katharina Salter MD;  Location: [facility] ENDOSCOPY;  Service: Pulmonary;  Laterality: Bilateral;  mucus plugging    CARDIAC CATHETERIZATION N/A 01/29/2023    Procedure: Left Heart Cath;  Surgeon: Jhonnie Ang MD;  Location: [facility] CATH INVASIVE LOCATION;  Service: Cardiology;  Laterality: N/A;    CARDIAC CATHETERIZATION N/A 01/29/2023    Procedure: Coronary angiography;  Surgeon: Johnnie Ang MD;  Location: [facility] CATH INVASIVE LOCATION;  Service: Cardiology;  Laterality: N/A;    COLONOSCOPY  05/17/2013    eh, ih, tort, sig tics    COLONOSCOPY N/A 05/10/2019    Non-thrombosed external hemorrhoids found on perianal exam, Diverticulosis, Tortuous colon, One 5 mm polyp in the mid ascending colon, IH. Path: Tubular adenoma.     COLONOSCOPY N/A 09/24/2022    Procedure: COLONOSCOPY to cecum and TI with argon plasma coagulation.;  Surgeon: Bruce Black MD;  Location: [facility] ENDOSCOPY;  Service: Gastroenterology;  Laterality: N/A;  pre- GI bleeding  post- radiation proctitis, diverticulosis    ENDOSCOPY  03/19/2015    z line irreg, hh    ENDOSCOPY N/A 09/24/2022    Procedure: ESOPHAGOGASTRODUODENOSCOPY;  Surgeon: Bruce Black MD;  Location: [facility] ENDOSCOPY;  Service: Gastroenterology;  Laterality: N/A;  pre- GI bleeding  post- esophagitis, hiatal hernia    HIP OPEN REDUCTION Right 01/17/2018    Procedure: HIP OPEN REDUCTION INTERNAL FIXATION WITH DYNAMIC HIP SCREW;  Surgeon: Les Black MD;  Location: [facility] MAIN OR;  Service:     SPINE SURGERY      TONSILLECTOMY      TOTAL HIP ARTHROPLASTY REVISION Right 09/23/2019    Procedure: HIP  REVISION RIGHT;  Surgeon: Isauro Warner MD;  Location: [facility] MAIN OR;  Service: Orthopedics         FAMILY HISTORY  Family History   Problem Relation Age of Onset    Thyroid disease Mother     No Known Problems Father     Malig Hyperthermia Neg Hx          SOCIAL  HISTORY  Social History     Socioeconomic History    Marital status: Single   Tobacco Use    Smoking status: Never    Smokeless tobacco: Never   Vaping Use    Vaping status: Never Used   Substance and Sexual Activity    Alcohol use: No     Comment: red wine occassional    Drug use: No    Sexual activity: Defer         ALLERGIES  Daliresp [roflumilast], Latex, and Sulfa antibiotics        REVIEW OF SYSTEMS  Review of Systems     All systems reviewed and negative except for those discussed in HPI.       PHYSICAL EXAM    I have reviewed the triage vital signs and nursing notes.    ED Triage Vitals [06/01/25 1157]   Temp Heart Rate Resp BP SpO2   97.9 °F (36.6 °C) 91 16 109/65 96 %      Temp src Heart Rate Source Patient Position BP Location FiO2 (%)   Oral Monitor Lying Right arm --       GENERAL: This is an elderly male that appears chronically ill and frail.  Vital signs on my initial evaluation his blood pressure is in the low 100s to upper 90s.  His heart rate is normal he is afebrile his O2 sat is 98% on 2 L.  HENT: nares patent  Head/neck/ face are symmetric without gross deformity, signs of trauma, or swelling  EYES: no scleral icterus, no conjunctival pallor.  He has no trismus.  He has dry oral mucosa and cracking of the lips.  His pharynx appears symmetric with no obvious swelling or abnormality.  NECK: With movement of his neck which is chronic.  CV: regular rhythm, regular rate with intact distal pulses.  No murmur or rub  RESPIRATORY: normal effort and no respiratory distress.  Essentially clear to auscultation other than very mild crackles in the base of his lungs bilaterally.  ABDOMEN: soft and obese.  Normal inspection he appears to have some generalized diffuse abdominal tenderness he cannot localize an area.  But his belly is soft there is no guarding positive bowel sounds.  Intact femoral pulses that are equal strong and symmetric bilaterally.  On male  exam he has no swelling or rash or  discomfort.  No urethral discharge.  MUSCULOSKELETAL: no deformity.  Symmetric appearing lower extremities has intact distal pulses to his distal extremities.  He is got this nonspecific rash that looks like a mild dermatitis or potential contact dermatitis around his groin bilaterally.  NEURO: alert and appropriate, moves all extremities, follows commands.  Neurolyse weakness.  No focal motor or sensory changes.  Stroke scale is 0.  Does have generalized weakness  SKIN: warm, dry    Vital signs and nursing notes reviewed.        LAB RESULTS  Recent Results (from the past 24 hours)   ECG 12 Lead ED Triage Standing Order; Abdominal Pain (Upper)    Collection Time: 06/01/25 12:09 PM   Result Value Ref Range    QT Interval 387 ms    QTC Interval 460 ms   Comprehensive Metabolic Panel    Collection Time: 06/01/25 12:38 PM    Specimen: Arm, Right; Blood   Result Value Ref Range    Glucose 86 65 - 99 mg/dL    BUN 11.0 8.0 - 23.0 mg/dL    Creatinine 0.89 0.76 - 1.27 mg/dL    Sodium 137 136 - 145 mmol/L    Potassium 3.4 (L) 3.5 - 5.2 mmol/L    Chloride 96 (L) 98 - 107 mmol/L    CO2 23.0 22.0 - 29.0 mmol/L    Calcium 9.7 8.6 - 10.5 mg/dL    Total Protein 6.7 6.0 - 8.5 g/dL    Albumin 3.4 (L) 3.5 - 5.2 g/dL    ALT (SGPT) 8 1 - 41 U/L    AST (SGOT) 15 1 - 40 U/L    Alkaline Phosphatase 86 39 - 117 U/L    Total Bilirubin 0.5 0.0 - 1.2 mg/dL    Globulin 3.3 gm/dL    A/G Ratio 1.0 g/dL    BUN/Creatinine Ratio 12.4 7.0 - 25.0    Anion Gap 18.0 (H) 5.0 - 15.0 mmol/L    eGFR 82.9 >60.0 mL/min/1.73   Lipase    Collection Time: 06/01/25 12:38 PM    Specimen: Arm, Right; Blood   Result Value Ref Range    Lipase 12 (L) 13 - 60 U/L   High Sensitivity Troponin T    Collection Time: 06/01/25 12:38 PM    Specimen: Arm, Right; Blood   Result Value Ref Range    HS Troponin T 28 (H) <22 ng/L   Green Top (Gel)    Collection Time: 06/01/25 12:38 PM   Result Value Ref Range    Extra Tube Hold for add-ons.    Lavender Top    Collection Time:  06/01/25 12:38 PM   Result Value Ref Range    Extra Tube hold for add-on    Gold Top - SST    Collection Time: 06/01/25 12:38 PM   Result Value Ref Range    Extra Tube Hold for add-ons.    Light Blue Top    Collection Time: 06/01/25 12:38 PM   Result Value Ref Range    Extra Tube Hold for add-ons.    CBC Auto Differential    Collection Time: 06/01/25 12:38 PM    Specimen: Arm, Right; Blood   Result Value Ref Range    WBC 5.66 3.40 - 10.80 10*3/mm3    RBC 4.70 4.14 - 5.80 10*6/mm3    Hemoglobin 14.9 13.0 - 17.7 g/dL    Hematocrit 44.2 37.5 - 51.0 %    MCV 94.0 79.0 - 97.0 fL    MCH 31.7 26.6 - 33.0 pg    MCHC 33.7 31.5 - 35.7 g/dL    RDW 12.2 (L) 12.3 - 15.4 %    RDW-SD 42.5 37.0 - 54.0 fl    MPV 8.8 6.0 - 12.0 fL    Platelets 289 140 - 450 10*3/mm3    Neutrophil % 51.5 42.7 - 76.0 %    Lymphocyte % 37.8 19.6 - 45.3 %    Monocyte % 9.9 5.0 - 12.0 %    Eosinophil % 0.0 (L) 0.3 - 6.2 %    Basophil % 0.4 0.0 - 1.5 %    Immature Grans % 0.4 0.0 - 0.5 %    Neutrophils, Absolute 2.92 1.70 - 7.00 10*3/mm3    Lymphocytes, Absolute 2.14 0.70 - 3.10 10*3/mm3    Monocytes, Absolute 0.56 0.10 - 0.90 10*3/mm3    Eosinophils, Absolute 0.00 0.00 - 0.40 10*3/mm3    Basophils, Absolute 0.02 0.00 - 0.20 10*3/mm3    Immature Grans, Absolute 0.02 0.00 - 0.05 10*3/mm3    nRBC 0.0 0.0 - 0.2 /100 WBC   Protime-INR    Collection Time: 06/01/25 12:38 PM    Specimen: Arm, Right; Blood   Result Value Ref Range    Protime 15.0 (H) 11.7 - 14.2 Seconds    INR 1.19 (H) 0.90 - 1.10   BNP    Collection Time: 06/01/25 12:38 PM    Specimen: Arm, Right; Blood   Result Value Ref Range    proBNP 144.0 0.0 - 1,800.0 pg/mL   Lactic Acid, Plasma    Collection Time: 06/01/25 12:38 PM    Specimen: Arm, Right; Blood   Result Value Ref Range    Lactate 1.3 0.5 - 2.0 mmol/L   Magnesium    Collection Time: 06/01/25 12:38 PM    Specimen: Arm, Right; Blood   Result Value Ref Range    Magnesium 1.7 1.6 - 2.4 mg/dL   Procalcitonin    Collection Time: 06/01/25 12:38  PM    Specimen: Arm, Right; Blood   Result Value Ref Range    Procalcitonin 0.06 0.00 - 0.25 ng/mL   Urinalysis With Microscopic If Indicated (No Culture) - Urine, Clean Catch    Collection Time: 06/01/25  1:06 PM    Specimen: Urine, Clean Catch   Result Value Ref Range    Color, UA Yellow Yellow, Straw    Appearance, UA Clear Clear    pH, UA <=5.0 5.0 - 8.0    Specific Gravity, UA 1.015 1.005 - 1.030    Glucose, UA Negative Negative    Ketones, UA 40 mg/dL (2+) (A) Negative    Bilirubin, UA Negative Negative    Blood, UA Negative Negative    Protein, UA Negative Negative    Leuk Esterase, UA Negative Negative    Nitrite, UA Negative Negative    Urobilinogen, UA 0.2 E.U./dL 0.2 - 1.0 E.U./dL   High Sensitivity Troponin T 1Hr    Collection Time: 06/01/25  1:58 PM    Specimen: Arm, Right; Blood   Result Value Ref Range    HS Troponin T 26 (H) <22 ng/L    Troponin T Numeric Delta -2 ng/L    Troponin T % Delta -7 Abnormal if >/= 20%       Ordered the above labs and independently reviewed the results.        RADIOLOGY  CT Abdomen Pelvis With Contrast  Result Date: 6/1/2025  CT ABDOMEN PELVIS W CONTRAST-  Radiation dose reduction techniques were utilized, including automated exposure control and exposure modulation based on body size.  Clinical: Vomiting and abdominal pain  COMPARISON examination dated 7/29/2021  FINDINGS: 1. There is diverticulosis of the sigmoid colon, no diverticulitis. Incomplete descent of the cecum which is located in the right upper quadrant. The appendix is normal in appearance. No small bowel abnormality. Moderate size hiatal hernia. The stomach is collapsed and cannot be adequately evaluated, overall contour is within normal limits. No duodenal abnormality.  2. The gallbladder is normal, no biliary duct dilatation. The pancreas markable. Liver and spleen are normal. Both adrenal glands have a satisfactory appearance. Both kidneys demonstrate a normal pattern of enhancement without obstructive  uropathy. 10 mm cyst lower pole of the left kidney, there also appears to be a 2-3 mm nonobstructing left renal calculus. Both ureters are normal in course and caliber to the urinary bladder which is typical in appearance.  3. There is thoracolumbar scoliosis with likely accounts for the asymmetric psoas size, larger on the right than the left. This is similar to the previous examination. L1 compression deformity similar to the previous examination. L3 compression deformity has developed since that time. There is considerable thoracolumbar degenerative change. Total right hip replacement prosthesis is similar to 2021. No acute osseous abnormality of the right hemipelvis or proximal femur. Old healed bilateral pubic rami fractures.  The remainder is unremarkable.     This report was finalized on 6/1/2025 2:40 PM by Dr. Richard Solitario M.D on Workstation: [id number]      XR Chest 1 View  Result Date: 6/1/2025  XR CHEST 1 VW-  Clinical: History of CHF  COMPARISON examination 3/18/2025  FINDINGS: There is a shallow inspiratory effort. Cardiac size within normal limits. The area of consolidation previously seen at the left lung base has diminished, there appears to be residual or recurrent infiltrate at this location. There is new vague curvilinear opacity demonstrated at the right apex. This could represent a small amount of new pleural fluid or apical infiltrate. There could be a minimal amount of residual infiltrate within the right midlung zone. No vascular congestion. The remainder is unremarkable.  This report was finalized on 6/1/2025 1:38 PM by Dr. Richard Solitario M.D on Workstation: [id number]        I ordered the above noted radiological studies. Reviewed by me and discussed with radiologist.  See dictation for official radiology interpretation.      PROCEDURES    Procedures      MEDICATIONS GIVEN IN ER    Medications   sodium chloride 0.9 % flush 10 mL (has no administration in time range)   sodium chloride  0.9 % flush 10 mL (has no administration in time range)   acetaminophen (TYLENOL) tablet 650 mg (has no administration in time range)     Or   acetaminophen (TYLENOL) 160 MG/5ML oral solution 650 mg (has no administration in time range)     Or   acetaminophen (TYLENOL) suppository 650 mg (has no administration in time range)   ondansetron ODT (ZOFRAN-ODT) disintegrating tablet 4 mg (has no administration in time range)     Or   ondansetron (ZOFRAN) injection 4 mg (has no administration in time range)   melatonin tablet 2.5 mg (has no administration in time range)   sennosides-docusate (PERICOLACE) 8.6-50 MG per tablet 2 tablet (has no administration in time range)     And   polyethylene glycol (MIRALAX) packet 17 g (has no administration in time range)     And   bisacodyl (DULCOLAX) EC tablet 5 mg (has no administration in time range)     And   bisacodyl (DULCOLAX) suppository 10 mg (has no administration in time range)   sodium chloride 0.9 % bolus 1,000 mL (0 mL Intravenous Stopped 6/1/25 175)   iopamidol (ISOVUE-300) 61 % injection 100 mL (85 mL Intravenous Given by Other 6/1/25 1419)         All labs have been independently reviewed by me.  All radiology studies have been reviewed by me and I discussed with radiologist dictating the report when indicated below.  All EKG's independently viewed and interpreted by me.  Discussion below represents my analysis of pertinent findings related to patient's condition, differential diagnosis, treatment plan and final disposition.        PROGRESS, DATA ANALYSIS, CONSULTS, AND MEDICAL DECISION MAKING    Differential diagnosis includes   - hepatobiliary pathology such as cholecystitis, cholangitis, and symptomatic cholelithiasis  -PUD  -Mesenteric ischemia  - Pancreatitis  - Dyspepsia  - Small bowel or large bowel obstruction  - Appendicitis  - Diverticulitis  - UTI including pyelonephritis  - Ureteral stone  - Zoster  - Colitis, including infectious and ischemic  - Atypical  ACS  , Does not want a thing for pain at this time.  He clinically looks dehydrated will can start some IV fluids on him.  Will get a check lab work including a CT scan of his abdomen and pelvis.  Ultimately anticipate this luis alfredo will probably end up getting admitted to the hospital.  All questions answered at this time.      ED Course as of 06/01/25 1909   Sun Jun 01, 2025   1341 My own independent or potation of the EKG that was done at 1209 reveals a rate of 85 it is sinus rhythm narrow complex there is a premature ventricular contraction I see some nonspecific ST and T wave changes but I will see any definitive acute injury pattern  QT looks normal  I compared this EKG to the EKG that was done on February 28, 2025 and overall looks fairly similar [MM]   1348 I looked at the chest x-ray also reviewed the radiologist report.  There is some shallow inspiratory effort no obvious cardiomegaly no obvious focal area of consolidation there is a nonspecific curvilinear opacity demonstrated in the right apex uncertain what this is no obvious vascular congestion noted.  Please see complete dictated report from radiologist [MM]   1349 Ketones, UA(!): 40 mg/dL (2+) [MM]   1349 BUN: 11.0 [MM]   1349 Creatinine: 0.89 [MM]   1349 WBC: 5.66 [MM]   1349 Hemoglobin: 14.9 [MM]   1350 HS Troponin T(!): 28 [MM]   1350 proBNP: 144.0 [MM]   1350 Lactate: 1.3 [MM]   1515 Patient's blood pressure has improved with saline.  Clinically I suspect this gentleman is dry and volume contracted.  Patient is afebrile.  Lactic acid and procalcitonin is normal. [MM]   1515 Procalcitonin: 0.06 [MM]   1517 I reviewed the CT scan report of the abdomen pelvis from the radiologist interpretation there is a moderate size hiatal hernia there is diverticulosis seen in the colon no diverticulitis gallbladder appears normal no biliary duct dilatation there is some cysts likely in the left kidney essentially unremarkable CT scan of the abdomen pelvis for any  acute abnormality.  Please see complete dictated report from radiologist. [MM]   1523 I have reevaluated this patient.  His blood pressure is much improved after the fluid bolus.  It is in the 120s systolic now his heart rate is in the 80s.  O2 sat is upper 90 percentile on his 2 L denies any chest pain or shortness of breath.  He feels as if his discomfort in his belly is better and is resolved.  He does not have any nausea.  He is not certain why he is eating or drinking he just has no appetite and no desire to eat or drink.  I informed about the results of the lab work and CT scans.  My plan is to admit him to the hospital.  All questions answered at this time. [MM]   1544 I did discuss the case with Dr. Parra who is on for A.  Informed her of the patient's presenting symptoms and results of the test.  She agrees to admit the patient to the hospital. [MM]      ED Course User Index  [MM] Nando Gallegos MD       AS OF 19:09 EDT VITALS:    BP - 121/90  HR - 84  TEMP - 97.7 °F (36.5 °C) (Oral)  02 SATS - 99%    SOCIAL DETERMINANTS OF HEALTH THAT IMPACT OR LIMIT CARE (For example..Homelessness,safe discharge, inability to obtain care, follow up, or prescriptions):      DIAGNOSIS  Final diagnoses:   Generalized abdominal pain   Hypotension due to hypovolemia   Chronic respiratory failure with hypoxia         DISPOSITION  I have reviewed the test results with my patient and explained the current treatment plan.  I answered all of the patient's questions.  The patient will be admitted to monitor bed at this time.  The patient is not hypotensive and is tolerating their current disease condition well enough for a monitored bed at this time.  The patient's current condition does not require intensive care treatment at this time.            DICTATED UTILIZING DRAGON DICTATION    Note Disclaimer: At [facility], we believe that sharing information builds trust and better relationships. You are receiving this note  because you recently visited [facility]. It is possible you will see health information before a provider has talked with you about it. This kind of information can be easy to misunderstand. To help you fully understand what it means for your health, we urge you to discuss this note with your provider.       Nando Gallegos MD  06/01/25 [id number]

## 2025-06-01 NOTE — H&P
HISTORY AND PHYSICAL   [facility]        Date of Admission: 2025  Patient Identification:  Name: Tony Leonard  Age: 85 y.o.  Sex: male  :  1940  MRN: [id number]                     Primary Care Physician: Alfonso Goldstein MD    Chief Complaint:  85 year old gentleman presented to the emergency room with abdominal pain; he was covered in urine per triage; he denies fever or chills; the pain is not in any specific location; denies black or bloody stool; he is on home oxygen due to copd; denies shortness of breath or chest  pain; no visitors are present with him;     History of Present Illness:   As above    Past Medical History:  Past Medical History:   Diagnosis Date    ADHD (attention deficit hyperactivity disorder)     Altered mental status, unspecified     Anemia, unspecified     Attention-deficit hyperactivity disorder, unspecified type     Benign prostatic hyperplasia without lower urinary tract symptoms     Body mass index 26.0-26.9, adult     Bronchiectasis     Bronchiectasis, uncomplicated     Chest pain, unspecified     CHF (congestive heart failure)     Chronic diastolic (congestive) heart failure     Chronic respiratory failure with hypoxia     Cognitive communication deficit     Colon polyp     COPD (chronic obstructive pulmonary disease)     Depression, unspecified     Diaphragmatic hernia without obstruction or gangrene     Diverticulosis     Diverticulosis of both small and large intestine without perforation or abscess without bleeding     Dorsalgia, unspecified     Encounter for immunization     Eosinophilic asthma     Essential (primary) hypertension     Gastroesophageal reflux disease without esophagitis     Hard of hearing     Hyperlipidemia, unspecified     Hypertensive heart disease with congestive heart failure     Hypoxemia     Influenza due to other identified influenza virus with other respiratory manifestations     Insomnia, unspecified     Localized  edema     Mild protein-calorie malnutrition     Muscle weakness (generalized)     On home oxygen therapy     2 LITERS    Other specified abnormal findings of blood chemistry     Overweight     Paroxysmal atrial fibrillation     Personal history of malignant neoplasm of prostate     Pneumonia     Polyneuropathy, unspecified     Prostate cancer 04/22/2019    Restless leg syndrome     Sleep related hypoventilation in conditions classified elsewhere     Spinal stenosis, lumbar region with neurogenic claudication 08/02/2022    Spinal stenosis, lumbar region, with neurogenic claudication     Weakness      Past Surgical History:  Past Surgical History:   Procedure Laterality Date    BRONCHOSCOPY N/A 04/30/2016    Procedure: BRONCHOSCOPY with BAL of right lower lobe and left  lower lobe.  ;  Surgeon: Nando Diaz MD;  Location: [facility] ENDOSCOPY;  Service:     BRONCHOSCOPY N/A 04/28/2017    Procedure: BRONCHOSCOPY;  Surgeon: Katharina Salter MD;  Location: [facility] ENDOSCOPY;  Service:     BRONCHOSCOPY Bilateral 06/29/2017    Procedure: BRONCHOSCOPY WITH WASHINGS;  Surgeon: Katharina Salter MD;  Location: [facility] ENDOSCOPY;  Service:     BRONCHOSCOPY N/A 01/22/2018    Procedure: BRONCHOSCOPY AT BEDSIDE with BAL;  Surgeon: Katharina Salter MD;  Location: [facility] ENDOSCOPY;  Service:     BRONCHOSCOPY N/A 01/30/2018    Procedure: BRONCHOSCOPY with BAL and washing;  Surgeon: Shreyas Wild MD;  Location: [facility] ENDOSCOPY;  Service:     BRONCHOSCOPY Bilateral 04/24/2018    Procedure: BRONCHOSCOPY WITH WASHING;  Surgeon: Katharina Salter MD;  Location: [facility] ENDOSCOPY;  Service: Pulmonary    BRONCHOSCOPY N/A 12/28/2019    Procedure: BRONCHOSCOPY with bilateral washing;  Surgeon: Katharina Salter MD;  Location: [facility] ENDOSCOPY;  Service: Pulmonary    BRONCHOSCOPY Bilateral 01/04/2023    Procedure: BRONCHOSCOPY with lavage;  Surgeon: Katharina Salter MD;  Location: [facility] ENDOSCOPY;  Service: Pulmonary;  Laterality: Bilateral;  mucus  plugging    CARDIAC CATHETERIZATION N/A 01/29/2023    Procedure: Left Heart Cath;  Surgeon: Johnnie Ang MD;  Location: [facility] CATH INVASIVE LOCATION;  Service: Cardiology;  Laterality: N/A;    CARDIAC CATHETERIZATION N/A 01/29/2023    Procedure: Coronary angiography;  Surgeon: Johnnie Ang MD;  Location: [facility] CATH INVASIVE LOCATION;  Service: Cardiology;  Laterality: N/A;    COLONOSCOPY  05/17/2013    eh, ih, tort, sig tics    COLONOSCOPY N/A 05/10/2019    Non-thrombosed external hemorrhoids found on perianal exam, Diverticulosis, Tortuous colon, One 5 mm polyp in the mid ascending colon, IH. Path: Tubular adenoma.     COLONOSCOPY N/A 09/24/2022    Procedure: COLONOSCOPY to cecum and TI with argon plasma coagulation.;  Surgeon: Bruce Black MD;  Location: [facility] ENDOSCOPY;  Service: Gastroenterology;  Laterality: N/A;  pre- GI bleeding  post- radiation proctitis, diverticulosis    ENDOSCOPY  03/19/2015    z line irreg, hh    ENDOSCOPY N/A 09/24/2022    Procedure: ESOPHAGOGASTRODUODENOSCOPY;  Surgeon: Bruce Black MD;  Location[facility] ENDOSCOPY;  Service: Gastroenterology;  Laterality: N/A;  pre- GI bleeding  post- esophagitis, hiatal hernia    HIP OPEN REDUCTION Right 01/17/2018    Procedure: HIP OPEN REDUCTION INTERNAL FIXATION WITH DYNAMIC HIP SCREW;  Surgeon: Les Black MD;  Location: [facility] MAIN OR;  Service:     SPINE SURGERY      TONSILLECTOMY      TOTAL HIP ARTHROPLASTY REVISION Right 09/23/2019    Procedure: HIP  REVISION RIGHT;  Surgeon: Isauro Warner MD;  Location: [facility] MAIN OR;  Service: Orthopedics      Home Meds:  (Not in a hospital admission)      Allergies:  Allergies   Allergen Reactions    Daliresp [Roflumilast] Anaphylaxis    Latex Rash    Sulfa Antibiotics Rash     Immunizations:  Immunization History   Administered Date(s) Administered    COVID-19 (PFIZER) Purple Cap Monovalent 01/15/2021, 02/05/2021, 10/26/2021    Covid-19 (Pfizer) Gray Cap Monovalent  2022    Fluzone High-Dose 65+YRS 2013, 2019, 2020    Influenza Seasonal Injectable 10/01/2021    Influenza, Unspecified 2017, 10/01/2021    Pneumococcal Conjugate 13-Valent (PCV13) 2017    Pneumococcal Polysaccharide (PPSV23) 10/10/2003     Social History:   Social History     Social History Narrative    Not on file     Social History     Socioeconomic History    Marital status: Single   Tobacco Use    Smoking status: Never    Smokeless tobacco: Never   Vaping Use    Vaping status: Never Used   Substance and Sexual Activity    Alcohol use: No     Comment: red wine occassional    Drug use: No    Sexual activity: Defer       Family History:  Family History   Problem Relation Age of Onset    Thyroid disease Mother     No Known Problems Father     Malig Hyperthermia Neg Hx         Review of Systems  See history of present illness and past medical history.  Patient denies headache, dizziness, syncope, falls, trauma, change in vision, change in hearing, change in taste, changes in weight, changes in appetite, focal weakness, numbness, or paresthesia.  Patient denies chest pain, palpitations, dyspnea, orthopnea, PND, cough, sinus pressure, rhinorrhea, epistaxis, hemoptysis, nausea, vomiting,hematemesis, diarrhea, constipation or hematochezia.  Denies cold or heat intolerance, polydipsia, polyuria, polyphagia. Denies hematuria, pyuria, dysuria, hesitancy, frequency or urgency. Denies consumption of raw and under cooked meats foods or change in water source.  Denies fever, chills, sweats, night sweats.  Denies missing any routine medications. Remainder of ROS is negative.    Objective:  T Max 24 hrs: Temp (24hrs), Av.8 °F (36.6 °C), Min:97.7 °F (36.5 °C), Max:97.9 °F (36.6 °C)    Vitals Ranges:   Temp:  [97.7 °F (36.5 °C)-97.9 °F (36.6 °C)] 97.7 °F (36.5 °C)  Heart Rate:  [84-96] 85  Resp:  [16-18] 16  BP: ()/(59-90) 140/72      Exam:  /72   Pulse 85   Temp 97.7 °F (36.5  °C) (Oral)   Resp 16   SpO2 99%     General Appearance:    Alert, cooperative, no distress, appears stated age   Head:    Normocephalic, without obvious abnormality, atraumatic   Eyes:    PERRL, conjunctivae/corneas clear, EOM's intact, both eyes   Ears:    Normal external ear canals, both ears   Nose:   Nares normal, septum midline, mucosa normal, no drainage    or sinus tenderness   Throat:   Lips, mucosa, and tongue normal   Neck:   Supple, symmetrical, trachea midline, no adenopathy;     thyroid:  no enlargement/tenderness/nodules; no carotid    bruit or JVD   Back:     Symmetric, no curvature, ROM normal, no CVA tenderness   Lungs:     Clear to auscultation bilaterally, respirations unlabored   Chest Wall:    No tenderness or deformity    Heart:    Regular rate and rhythm, S1 and S2 normal, no murmur, rub   or gallop   Abdomen:     Soft, nontender, bowel sounds active all four quadrants,     no masses, no hepatomegaly, no splenomegaly   Extremities:   Extremities normal, atraumatic, no cyanosis or edema   Pulses:   2+ and symmetric all extremities   Skin:   Skin color, texture, turgor normal, no rashes or lesions   Lymph nodes:   Cervical, supraclavicular, and axillary nodes normal   Neurologic:   CNII-XII intact, normal strength, sensation intact throughout      .    Data Review:  Labs in chart were reviewed.  WBC   Date Value Ref Range Status   06/01/2025 5.66 3.40 - 10.80 10*3/mm3 Final     Hemoglobin   Date Value Ref Range Status   06/01/2025 14.9 13.0 - 17.7 g/dL Final     Hematocrit   Date Value Ref Range Status   06/01/2025 44.2 37.5 - 51.0 % Final     Platelets   Date Value Ref Range Status   06/01/2025 289 140 - 450 10*3/mm3 Final     Sodium   Date Value Ref Range Status   06/01/2025 137 136 - 145 mmol/L Final     Potassium   Date Value Ref Range Status   06/01/2025 3.4 (L) 3.5 - 5.2 mmol/L Final     Chloride   Date Value Ref Range Status   06/01/2025 96 (L) 98 - 107 mmol/L Final     CO2   Date Value  Ref Range Status   06/01/2025 23.0 22.0 - 29.0 mmol/L Final     BUN   Date Value Ref Range Status   06/01/2025 11.0 8.0 - 23.0 mg/dL Final     Creatinine   Date Value Ref Range Status   06/01/2025 0.89 0.76 - 1.27 mg/dL Final     Glucose   Date Value Ref Range Status   06/01/2025 86 65 - 99 mg/dL Final     Calcium   Date Value Ref Range Status   06/01/2025 9.7 8.6 - 10.5 mg/dL Final     Magnesium   Date Value Ref Range Status   06/01/2025 1.7 1.6 - 2.4 mg/dL Final     AST (SGOT)   Date Value Ref Range Status   06/01/2025 15 1 - 40 U/L Final     ALT (SGPT)   Date Value Ref Range Status   06/01/2025 8 1 - 41 U/L Final     Alkaline Phosphatase   Date Value Ref Range Status   06/01/2025 86 39 - 117 U/L Final                Imaging Results (All)       Procedure Component Value Units Date/Time    CT Abdomen Pelvis With Contrast [[id number]] Collected: 06/01/25 1431     Updated: 06/01/25 1443    Narrative:      CT ABDOMEN PELVIS W CONTRAST-     Radiation dose reduction techniques were utilized, including automated  exposure control and exposure modulation based on body size.     Clinical: Vomiting and abdominal pain     COMPARISON examination dated 7/29/2021     FINDINGS:  1. There is diverticulosis of the sigmoid colon, no diverticulitis.  Incomplete descent of the cecum which is located in the right upper  quadrant. The appendix is normal in appearance. No small bowel  abnormality. Moderate size hiatal hernia. The stomach is collapsed and  cannot be adequately evaluated, overall contour is within normal limits.  No duodenal abnormality.     2. The gallbladder is normal, no biliary duct dilatation. The pancreas  markable. Liver and spleen are normal. Both adrenal glands have a  satisfactory appearance. Both kidneys demonstrate a normal pattern of  enhancement without obstructive uropathy. 10 mm cyst lower pole of the  left kidney, there also appears to be a 2-3 mm nonobstructing left renal  calculus. Both ureters are  normal in course and caliber to the urinary  bladder which is typical in appearance.     3. There is thoracolumbar scoliosis with likely accounts for the  asymmetric psoas size, larger on the right than the left. This is  similar to the previous examination. L1 compression deformity similar to  the previous examination. L3 compression deformity has developed since  that time. There is considerable thoracolumbar degenerative change.  Total right hip replacement prosthesis is similar to 2021. No acute  osseous abnormality of the right hemipelvis or proximal femur. Old  healed bilateral pubic rami fractures.     The remainder is unremarkable.              This report was finalized on 6/1/2025 2:40 PM by Dr. Richard Solitario M.D  on Workstation: [id number]       XR Chest 1 View [[id number]] Collected: 06/01/25 1333     Updated: 06/01/25 1341    Narrative:      XR CHEST 1 VW-     Clinical: History of CHF     COMPARISON examination 3/18/2025     FINDINGS: There is a shallow inspiratory effort. Cardiac size within  normal limits. The area of consolidation previously seen at the left  lung base has diminished, there appears to be residual or recurrent  infiltrate at this location. There is new vague curvilinear opacity  demonstrated at the right apex. This could represent a small amount of  new pleural fluid or apical infiltrate. There could be a minimal amount  of residual infiltrate within the right midlung zone. No vascular  congestion. The remainder is unremarkable.     This report was finalized on 6/1/2025 1:38 PM by Dr. Richard Solitario M.D  on Workstation: [id number]                 Assessment:  Active Hospital Problems    Diagnosis  POA    **Generalized abdominal pain [R10.84]  Yes      Resolved Hospital Problems   No resolved problems to display.   Copd  Chronic hypoxic respiratory failure  Hypertension  Hypokalemia  A fib  Anemia  Chronic diastolic chf    Plan:  Will hydrate  Trend labs  Ask gi to see  him  Ppi  Pt/ot to see  Dw patient and ed provider  Patient is full code and son is jean claude Mcneal Andrew Parra MD  6/1/2025  19:22 EDT

## 2025-06-02 PROBLEM — K59.00 CONSTIPATION: Status: ACTIVE | Noted: 2025-06-02

## 2025-06-02 LAB
ANION GAP SERPL CALCULATED.3IONS-SCNC: 16.5 MMOL/L (ref 5–15)
BUN SERPL-MCNC: 11 MG/DL (ref 8–23)
BUN/CREAT SERPL: 15.5 (ref 7–25)
CALCIUM SPEC-SCNC: 9 MG/DL (ref 8.6–10.5)
CHLORIDE SERPL-SCNC: 99 MMOL/L (ref 98–107)
CO2 SERPL-SCNC: 21.5 MMOL/L (ref 22–29)
CREAT SERPL-MCNC: 0.71 MG/DL (ref 0.76–1.27)
DEPRECATED RDW RBC AUTO: 40.9 FL (ref 37–54)
EGFRCR SERPLBLD CKD-EPI 2021: 88.8 ML/MIN/1.73
ERYTHROCYTE [DISTWIDTH] IN BLOOD BY AUTOMATED COUNT: 11.9 % (ref 12.3–15.4)
GLUCOSE SERPL-MCNC: 69 MG/DL (ref 65–99)
HCT VFR BLD AUTO: 38.1 % (ref 37.5–51)
HGB BLD-MCNC: 12.8 G/DL (ref 13–17.7)
MCH RBC QN AUTO: 31.3 PG (ref 26.6–33)
MCHC RBC AUTO-ENTMCNC: 33.6 G/DL (ref 31.5–35.7)
MCV RBC AUTO: 93.2 FL (ref 79–97)
PLATELET # BLD AUTO: 277 10*3/MM3 (ref 140–450)
PMV BLD AUTO: 9 FL (ref 6–12)
POTASSIUM SERPL-SCNC: 3.2 MMOL/L (ref 3.5–5.2)
POTASSIUM SERPL-SCNC: 3.8 MMOL/L (ref 3.5–5.2)
RBC # BLD AUTO: 4.09 10*6/MM3 (ref 4.14–5.8)
SODIUM SERPL-SCNC: 137 MMOL/L (ref 136–145)
WBC NRBC COR # BLD AUTO: 5.51 10*3/MM3 (ref 3.4–10.8)

## 2025-06-02 PROCEDURE — 94761 N-INVAS EAR/PLS OXIMETRY MLT: CPT

## 2025-06-02 PROCEDURE — 97162 PT EVAL MOD COMPLEX 30 MIN: CPT

## 2025-06-02 PROCEDURE — 80048 BASIC METABOLIC PNL TOTAL CA: CPT | Performed by: INTERNAL MEDICINE

## 2025-06-02 PROCEDURE — 84132 ASSAY OF SERUM POTASSIUM: CPT | Performed by: INTERNAL MEDICINE

## 2025-06-02 PROCEDURE — 63710000001 REVEFENACIN 175 MCG/3ML SOLUTION: Performed by: INTERNAL MEDICINE

## 2025-06-02 PROCEDURE — 94799 UNLISTED PULMONARY SVC/PX: CPT

## 2025-06-02 PROCEDURE — 36415 COLL VENOUS BLD VENIPUNCTURE: CPT | Performed by: INTERNAL MEDICINE

## 2025-06-02 PROCEDURE — 25010000002 ONDANSETRON PER 1 MG: Performed by: INTERNAL MEDICINE

## 2025-06-02 PROCEDURE — 99222 1ST HOSP IP/OBS MODERATE 55: CPT | Performed by: INTERNAL MEDICINE

## 2025-06-02 PROCEDURE — 97530 THERAPEUTIC ACTIVITIES: CPT

## 2025-06-02 PROCEDURE — 85027 COMPLETE CBC AUTOMATED: CPT | Performed by: INTERNAL MEDICINE

## 2025-06-02 PROCEDURE — 94664 DEMO&/EVAL PT USE INHALER: CPT

## 2025-06-02 RX ORDER — POTASSIUM CHLORIDE 1500 MG/1
40 TABLET, EXTENDED RELEASE ORAL EVERY 4 HOURS
Status: COMPLETED | OUTPATIENT
Start: 2025-06-02 | End: 2025-06-02

## 2025-06-02 RX ADMIN — IPRATROPIUM BROMIDE AND ALBUTEROL SULFATE 3 ML: .5; 3 SOLUTION RESPIRATORY (INHALATION) at 15:16

## 2025-06-02 RX ADMIN — Medication 2.5 MG: at 20:37

## 2025-06-02 RX ADMIN — GABAPENTIN 300 MG: 300 CAPSULE ORAL at 17:12

## 2025-06-02 RX ADMIN — POTASSIUM CHLORIDE 20 MEQ: 1.5 POWDER, FOR SOLUTION ORAL at 11:09

## 2025-06-02 RX ADMIN — BUDESONIDE 0.5 MG: 0.5 INHALANT RESPIRATORY (INHALATION) at 20:24

## 2025-06-02 RX ADMIN — ONDANSETRON 4 MG: 2 INJECTION, SOLUTION INTRAMUSCULAR; INTRAVENOUS at 13:27

## 2025-06-02 RX ADMIN — POTASSIUM CHLORIDE 40 MEQ: 1500 TABLET, EXTENDED RELEASE ORAL at 05:16

## 2025-06-02 RX ADMIN — ARFORMOTEROL TARTRATE 15 MCG: 15 SOLUTION RESPIRATORY (INHALATION) at 07:35

## 2025-06-02 RX ADMIN — POLYETHYLENE GLYCOL 3350 17 G: 17 POWDER, FOR SOLUTION ORAL at 11:09

## 2025-06-02 RX ADMIN — GUAIFENESIN 1200 MG: 600 TABLET, EXTENDED RELEASE ORAL at 01:08

## 2025-06-02 RX ADMIN — GUAIFENESIN 1200 MG: 600 TABLET, EXTENDED RELEASE ORAL at 20:37

## 2025-06-02 RX ADMIN — ACETAMINOPHEN 650 MG: 325 TABLET ORAL at 01:07

## 2025-06-02 RX ADMIN — ACETAMINOPHEN 650 MG: 325 TABLET ORAL at 13:27

## 2025-06-02 RX ADMIN — IPRATROPIUM BROMIDE AND ALBUTEROL SULFATE 3 ML: .5; 3 SOLUTION RESPIRATORY (INHALATION) at 11:12

## 2025-06-02 RX ADMIN — PANTOPRAZOLE SODIUM 40 MG: 40 INJECTION, POWDER, FOR SOLUTION INTRAVENOUS at 17:12

## 2025-06-02 RX ADMIN — ARFORMOTEROL TARTRATE 15 MCG: 15 SOLUTION RESPIRATORY (INHALATION) at 20:29

## 2025-06-02 RX ADMIN — IPRATROPIUM BROMIDE AND ALBUTEROL SULFATE 3 ML: .5; 3 SOLUTION RESPIRATORY (INHALATION) at 07:30

## 2025-06-02 RX ADMIN — PANTOPRAZOLE SODIUM 40 MG: 40 INJECTION, POWDER, FOR SOLUTION INTRAVENOUS at 06:37

## 2025-06-02 RX ADMIN — POTASSIUM CHLORIDE 40 MEQ: 1500 TABLET, EXTENDED RELEASE ORAL at 11:09

## 2025-06-02 RX ADMIN — GABAPENTIN 300 MG: 300 CAPSULE ORAL at 20:37

## 2025-06-02 RX ADMIN — SENNOSIDES AND DOCUSATE SODIUM 2 TABLET: 50; 8.6 TABLET ORAL at 11:09

## 2025-06-02 RX ADMIN — IPRATROPIUM BROMIDE AND ALBUTEROL SULFATE 3 ML: .5; 3 SOLUTION RESPIRATORY (INHALATION) at 20:19

## 2025-06-02 RX ADMIN — BUDESONIDE 0.5 MG: 0.5 INHALANT RESPIRATORY (INHALATION) at 07:32

## 2025-06-02 RX ADMIN — REVEFENACIN 175 MCG: 175 SOLUTION RESPIRATORY (INHALATION) at 07:39

## 2025-06-02 RX ADMIN — Medication 1 TABLET: at 11:09

## 2025-06-02 RX ADMIN — GABAPENTIN 300 MG: 300 CAPSULE ORAL at 11:09

## 2025-06-02 RX ADMIN — FERROUS SULFATE TAB 325 MG (65 MG ELEMENTAL FE) 325 MG: 325 (65 FE) TAB at 11:09

## 2025-06-02 RX ADMIN — FLUOXETINE 20 MG: 20 CAPSULE ORAL at 11:09

## 2025-06-02 NOTE — CONSULTS
Gastroenterology   Initial Inpatient Consult Note  Thank you for asking opinion on this patient.  Referring Provider: Juan Howell MD    Reason for Consultation: Abdominal pain    Subjective     History of present illness:    85 y.o. male that we are seeing for abdominal pain.  Patient sees Dr. Mattson.  He describes abdominal pain is diffuse and mild in generalized with no aggravating features.  He did just have a fairly large bowel movement this morning and states that the abdominal pain has essentially resolved and he feels much better at this point.  CT showed diverticulosis and hiatal hernia but no acute issues.    Past Medical History:  Past Medical History:   Diagnosis Date    ADHD (attention deficit hyperactivity disorder)     Altered mental status, unspecified     Anemia, unspecified     Attention-deficit hyperactivity disorder, unspecified type     Benign prostatic hyperplasia without lower urinary tract symptoms     Body mass index 26.0-26.9, adult     Bronchiectasis     Bronchiectasis, uncomplicated     Chest pain, unspecified     CHF (congestive heart failure)     Chronic diastolic (congestive) heart failure     Chronic respiratory failure with hypoxia     Cognitive communication deficit     Colon polyp     COPD (chronic obstructive pulmonary disease)     Depression, unspecified     Diaphragmatic hernia without obstruction or gangrene     Diverticulosis     Diverticulosis of both small and large intestine without perforation or abscess without bleeding     Dorsalgia, unspecified     Encounter for immunization     Eosinophilic asthma     Essential (primary) hypertension     Gastroesophageal reflux disease without esophagitis     Hard of hearing     Hyperlipidemia, unspecified     Hypertensive heart disease with congestive heart failure     Hypoxemia     Influenza due to other identified influenza virus with other respiratory manifestations     Insomnia, unspecified     Localized edema     Mild  protein-calorie malnutrition     Muscle weakness (generalized)     On home oxygen therapy     2 LITERS    Other specified abnormal findings of blood chemistry     Overweight     Paroxysmal atrial fibrillation     Personal history of malignant neoplasm of prostate     Pneumonia     Polyneuropathy, unspecified     Prostate cancer 04/22/2019    Restless leg syndrome     Sleep related hypoventilation in conditions classified elsewhere     Spinal stenosis, lumbar region with neurogenic claudication 08/02/2022    Spinal stenosis, lumbar region, with neurogenic claudication     Weakness      Past Surgical History:  Past Surgical History:   Procedure Laterality Date    BRONCHOSCOPY N/A 04/30/2016    Procedure: BRONCHOSCOPY with BAL of right lower lobe and left  lower lobe.  ;  Surgeon: Nando Diaz MD;  Location: [facility] ENDOSCOPY;  Service:     BRONCHOSCOPY N/A 04/28/2017    Procedure: BRONCHOSCOPY;  Surgeon: Katharina Salter MD;  Location: [facility] ENDOSCOPY;  Service:     BRONCHOSCOPY Bilateral 06/29/2017    Procedure: BRONCHOSCOPY WITH WASHINGS;  Surgeon: Katharina Salter MD;  Location: [facility] ENDOSCOPY;  Service:     BRONCHOSCOPY N/A 01/22/2018    Procedure: BRONCHOSCOPY AT BEDSIDE with BAL;  Surgeon: Katharina Salter MD;  Location: [facility] ENDOSCOPY;  Service:     BRONCHOSCOPY N/A 01/30/2018    Procedure: BRONCHOSCOPY with BAL and washing;  Surgeon: Shreyas Wild MD;  Location: [facility] ENDOSCOPY;  Service:     BRONCHOSCOPY Bilateral 04/24/2018    Procedure: BRONCHOSCOPY WITH WASHING;  Surgeon: Katharina Salter MD;  Location: [facility] ENDOSCOPY;  Service: Pulmonary    BRONCHOSCOPY N/A 12/28/2019    Procedure: BRONCHOSCOPY with bilateral washing;  Surgeon: Katharina Salter MD;  Location: [facility] ENDOSCOPY;  Service: Pulmonary    BRONCHOSCOPY Bilateral 01/04/2023    Procedure: BRONCHOSCOPY with lavage;  Surgeon: Katharina Salter MD;  Location: [facility] ENDOSCOPY;  Service: Pulmonary;  Laterality: Bilateral;  mucus plugging    CARDIAC  CATHETERIZATION N/A 01/29/2023    Procedure: Left Heart Cath;  Surgeon: Johnnie Ang MD;  [facility] [facility];  Service: Cardiology;  Laterality: N/A;    CARDIAC CATHETERIZATION N/A 01/29/2023    Procedure: Coronary angiography;  Surgeon: Johnnie Ang MD;  Location: [facility]IVE LOCATION;  Service: Cardiology;  Laterality: N/A;    COLONOSCOPY  05/17/2013    eh, ih, tort, sig tics    COLONOSCOPY N/A 05/10/2019    Non-thrombosed external hemorrhoids found on perianal exam, Diverticulosis, Tortuous colon, One 5 mm polyp in the mid ascending colon, IH. Path: Tubular adenoma.     COLONOSCOPY N/A 09/24/2022    Procedure: COLONOSCOPY to cecum and TI with argon plasma coagulation.;  Surgeon: Bruce Black MD;  Location: [facility] END[facility]COPY;  Service: Gastroenterology;  Laterality: N/A;  pre- GI bleeding  post- radiation proctitis, diverticulosis    ENDOSCOPY  03/19/2015    z line irreg, hh    ENDOSCOPY N/A 09/24/2022    Procedure: ESOPHAGOGASTRODUODENOSCOPY;  Surgeon: Bruce Black MD;  [facility];  Service: Gastroenterology;  Laterality: N/A;  pre- GI bleeding  post- esophagitis, hiatal hernia    HIP OPEN REDUCTION Right 01/17/2018    Procedure: HIP OPEN REDUCTION INTERNAL FIXATION WITH DYNAMIC HIP SCREW;  Surgeon: Les lBack MD;  Location: [facility] OR;  Service:     SPINE SURGERY      TONSILLECTOMY      TOTAL HIP ARTHROPLASTY REVISION Right 09/23/2019    Procedure: HIP  REVISION RIGHT;  Surgeon: Isauro Warner MD;  Location: [facility];  Service: Orthopedics      Social History:   Social History     Tobacco Use    Smoking status: Never    Smokeless tobacco: Never   Substance Use Topics    Alcohol use: No     Comment: red wine occassional      Family History:  Family History   Problem Relation Age of Onset    Thyroid disease Mother     No Known Problems Father     Malig Hyperthermia Neg Hx        Home Meds:  Medications Prior to Admission    Medication Sig Dispense Refill Last Dose/Taking    acetaminophen (TYLENOL) 325 MG tablet Take 2 tablets by mouth Every 4 (Four) Hours As Needed for Mild Pain.   5/31/2025    albuterol (PROVENTIL) (2.5 MG/3ML) 0.083% nebulizer solution Take 2.5 mg by nebulization Every 6 (Six) Hours As Needed for Wheezing. 360 mL 3 6/1/2025    Fluticasone-Umeclidin-Vilant (Trelegy Ellipta) 100-62.5-25 MCG/ACT inhaler Inhale 1 puff Daily.   Past Week    gabapentin (NEURONTIN) 300 MG capsule Take 1 capsule by mouth 3 (Three) Times a Day.   6/1/2025    Heat Wraps ([employer]Care Back Pain Therapy) misc Use 1 Pad Daily.   Past Month    multivitamin with minerals (Multivitamin Adult) tablet tablet Take 1 tablet by mouth Daily. 30 tablet 11 6/1/2025 Noon    sodium chloride 7 % nebulizer solution nebulizer solution Take 4 mL by nebulization Every 4 (Four) Hours As Needed (pt takes as needed at home).   5/31/2025 Noon    dimethicone (AVEENO) 1.3 % lotion lotion Apply 1 Application topically to the appropriate area as directed Every 12 (Twelve) Hours.   Unknown    ferrous sulfate 325 (65 FE) MG tablet Take 1 tablet by mouth Daily With Breakfast.   Unknown    FLUoxetine (PROzac) 20 MG capsule TAKE 1 CAPSULE DAILY 90 capsule 3 Unknown    furosemide (LASIX) 20 MG tablet Take 1 tablet by mouth 2 (Two) Times a Day.   Unknown    guaiFENesin (MUCINEX) 600 MG 12 hr tablet Take 2 tablets by mouth 2 (Two) Times a Day.   Unknown    ipratropium-albuterol (DUO-NEB) 0.5-2.5 mg/3 ml nebulizer Take 3 mL by nebulization 4 (Four) Times a Day.   Unknown    Melatonin 3 MG capsule Take 1 capsule by mouth every night at bedtime.   Unknown    menthol-zinc oxide (CALMOSEPTINE) 0.44-20.625 % ointment ointment Apply 1 Application topically to the appropriate area as directed Every 12 (Twelve) Hours.   Unknown    ondansetron ODT (ZOFRAN-ODT) 4 MG disintegrating tablet Place 1 tablet on the tongue Every 6 (Six) Hours As Needed for Nausea or Vomiting. (Patient taking  differently: Place 1 tablet on the tongue Every 4 (Four) Hours As Needed for Nausea or Vomiting.)   More than a month    pantoprazole (PROTONIX) 40 MG EC tablet Take 1 tablet by mouth Daily. 90 tablet 3 More than a month    potassium chloride (KLOR-CON) 20 MEQ packet Take 20 mEq by mouth Daily.   More than a month    sucralfate (Carafate) 1 g tablet Take 1 tablet by mouth 4 (Four) Times a Day. 120 tablet 0 Unknown    tuberculin (Aplisol) 5 UNIT/0.1ML injection Inject 0.1 mL into the appropriate area of the skin as directed by provider 1 (One) Time.   Unknown     Current Meds:   arformoterol, 15 mcg, Nebulization, BID - RT   And  budesonide, 0.5 mg, Nebulization, BID - RT   And  revefenacin, 175 mcg, Nebulization, Daily - RT  ferrous sulfate, 325 mg, Oral, Daily With Breakfast  FLUoxetine, 20 mg, Oral, Daily  gabapentin, 300 mg, Oral, TID  guaiFENesin, 1,200 mg, Oral, BID  ipratropium-albuterol, 3 mL, Nebulization, 4x Daily - RT  multivitamin with minerals, 1 tablet, Oral, Daily  pantoprazole, 40 mg, Intravenous, BID AC  potassium chloride ER, 40 mEq, Oral, Q4H  potassium chloride, 20 mEq, Oral, Daily      Allergies:  Allergies   Allergen Reactions    Daliresp [Roflumilast] Anaphylaxis    Latex Rash    Sulfa Antibiotics Rash     Review of Systems  Pertinent items are noted in HPI, all other systems reviewed and negative    Objective     Vital Signs  Temp:  [97.7 °F (36.5 °C)-97.9 °F (36.6 °C)] 97.7 °F (36.5 °C)  Heart Rate:  [] 81  Resp:  [16-18] 18  BP: ()/(59-90) 129/65    Physical Exam:  CONSTITUTIONAL:  today's vital signs reviewed  EARS NOSE THROAT: trachea midline and no deformity of the nares  EYES: no scleral icterus  GASTROINTESTINAL: abdomen is soft, nontender, nondistended with normal active bowel sounds, no masses are appreciated  PSYCHIATRIC: appropriate mood and affect  RESPIRATORY: normal inspiratory effort with no increased work of breathing  NEUROLOGIC: patient is awake and  alert  DERMATOLOGIC: skin is warm with no cyanosis  LYMPHATIC: no periumbilical lymphadenopathy     Results Review:   I reviewed the patient's new clinical results.    Results from last 7 days   Lab Units 06/02/25  0330 06/01/25  1238   WBC 10*3/mm3 5.51 5.66   HEMOGLOBIN g/dL 12.8* 14.9   HEMATOCRIT % 38.1 44.2   PLATELETS 10*3/mm3 277 289     Results from last 7 days   Lab Units 06/02/25  0330 06/01/25  1238   SODIUM mmol/L 137 137   POTASSIUM mmol/L 3.2* 3.4*   CHLORIDE mmol/L 99 96*   CO2 mmol/L 21.5* 23.0   BUN mg/dL 11.0 11.0   CREATININE mg/dL 0.71* 0.89   CALCIUM mg/dL 9.0 9.7   BILIRUBIN mg/dL  --  0.5   ALK PHOS U/L  --  86   ALT (SGPT) U/L  --  8   AST (SGOT) U/L  --  15   GLUCOSE mg/dL 69 86     Results from last 7 days   Lab Units 06/01/25  1238   INR  1.19*     Lab Results   Lab Value Date/Time    LIPASE 12 (L) 06/01/2025 1238    LIPASE 13 07/11/2023 2110    LIPASE 14 07/05/2023 1206    LIPASE 21 07/29/2021 1719    LIPASE 17 06/17/2017 1624       Radiology:  CT Abdomen Pelvis With Contrast   Final Result      XR Chest 1 View   Final Result          Assessment & Plan   Active Hospital Problems    Diagnosis     **Generalized abdominal pain     Chronic respiratory failure with hypoxia     Spinal stenosis, lumbar region with neurogenic claudication     HTN (hypertension)     Neuropathy     Chronic diastolic CHF (congestive heart failure)     COPD (chronic obstructive pulmonary disease)     Diverticul disease small and large intestine, no perforati or abscess     Gastroesophageal reflux disease        Assessment:  Abdominal pain.  This was likely secondary to a component of constipation as his pain nearly has resolved at this point after a large bowel movement this morning.  Diverticulosis without evidence of diverticulitis  Very mild anemia.  No overt bleeding.  Hiatal hernia.    Plan:  Maintain antireflux measures recommend daily PPI due to hiatal hernia  Recommend bowel regimen with daily MiraLAX to  avoid constipation.  I think his pain was likely due to a component of constipation since it improved so quickly after a bowel movement this morning.  No evidence on CT of mesenteric ischemia.  Exam benign.  He will follow-up as outpatient with Dr. Mattson.  No further recommendations at this point.      I discussed the patients findings and my recommendations with patient and nursing staff.    MD Noel De Oliveira M.D.  [facility] [place] [address]  Office: [phone]

## 2025-06-02 NOTE — PLAN OF CARE
Goal Outcome Evaluation:  Plan of Care Reviewed With: patient        Progress: improving  Outcome Evaluation: Pt is A/O x3, 2L Oxygen NC, assist x1, uses urinals. Provided prn pain medication for hip pain. Pt and GI evaluation is recommended. No acute changes over night.

## 2025-06-02 NOTE — DISCHARGE PLACEMENT REQUEST
"Tony Casey (85 y.o. Male)       Date of Birth   1940    Social Security Number       Address   [address] [place] [address]    Home Phone   [phone]    MRN   [id number]       Religious   Anabaptist    Marital Status   Single                            Admission Date   6/1/2025    Admission Type   Emergency    Admitting Provider   Elizabet Parra MD    Attending Provider   Juan Howell MD    Department, Room/Bed   [address], [address]/1       Discharge Date       Discharge Disposition       Discharge Destination                                 Attending Provider: Juan Howell MD    Allergies: Daliresp [Roflumilast], Latex, Sulfa Antibiotics    Isolation: None   Infection: None   Code Status: CPR    Ht: 180.3 cm (71\")   Wt: 78.8 kg (173 lb 13.3 oz)    Admission Cmt: None   Principal Problem: Generalized abdominal pain [R10.84]                   Active Insurance as of 6/1/2025       Primary Coverage       Payor Plan Insurance Group Employer/Plan Group    MEDICARE MEDICARE A & B        Payor Plan Address Payor Plan Phone Number Payor Plan Fax Number Effective Dates    [address] [phone]  5/1/2003 - None Entered    [facility] [id number]         Subscriber Name Subscriber Birth Date Member ID       TONY CASEY 1940 [id number]               Secondary Coverage       Payor Plan Insurance Group Employer/Plan Group     FOR LIFE  FOR LIFE  SUP         Payor Plan Address Payor Plan Phone Number Payor Plan Fax Number Effective Dates    [address] [phone]  3/10/2016 - None Entered    [facility] [address]         Subscriber Name Subscriber Birth Date Member ID       TNOY CASEY 1940 [id number]               Tertiary Coverage       Payor Plan Insurance Group Employer/Plan Group    [place]        Payor Plan Address Payor Plan Phone Number Payor Plan Fax Number " Effective Dates    [address] [phone]  2/1/2025 - None Entered    [place] [address]         Subscriber Name Subscriber Birth Date Member ID       RAMIRO CASEY 1940 [id number]                     Emergency Contacts        (Rel.) Home Phone Work Phone [place] Phone    VICKY CASEY (Son) [phone] -- [phone]    Frederick Casey (Son) -- -- [phone]    Ricardo Casey (Son) [phone] -- [phone]

## 2025-06-02 NOTE — CASE MANAGEMENT/SOCIAL WORK
Discharge Planning Assessment  [facility]     Patient Name: Tony Leonard  MRN: [id number]  Today's Date: 6/2/2025    Admit Date: 6/1/2025    Plan: [facility] when medically ready   Discharge Needs Assessment       Row Name 06/02/25 0941       Living Environment    People in Home facility resident    Current Living Arrangements extended care facility    Family Caregiver if Needed child(brian), adult    Quality of Family Relationships helpful;involved;supportive    Able to Return to Prior Arrangements yes       Transition Planning    Patient/Family Anticipates Transition to long-term care facility    Patient/Family Anticipated Services at Transition     Transportation Anticipated health plan transportation;family or friend will provide       Discharge Needs Assessment    Readmission Within the Last 30 Days no previous admission in last 30 days    Concerns to be Addressed discharge planning    Outpatient/Agency/Support Group Needs skilled nursing facility    Discharge Facility/Level of Care Needs nursing facility, skilled                   Discharge Plan       Row Name 06/02/25 0942       Plan    Plan [facility] when medically ready    Patient/Family in Agreement with Plan yes    Plan Comments Face sheet information and pharmacy reviewed. Pavithra/[facility] notified and confirmed pt is [facility] Medicaid bed hold and can return anytime. LVM for Edward, pts son, to confirm DC plan. Partial packet in CCP office, pharmacy updated to [facility] in Epic. Karie RN/CCP                  Continued Care and Services - Admitted Since 6/1/2025       Destination       Service Provider Request Status Services Address Phone Fax Patient Preferred    [facility] Accepted -- [address] [facility] [address] [phone] --                  Selected Continued Care - Prior Encounters Includes continued care and service providers with selected services from prior  encounters from 3/3/2025 to 6/2/2025      Discharged on 3/21/2025 Admission date: 3/18/2025 - Discharge disposition: Skilled Nursing Facility (DC - External)      Destination       Service Provider Services Address Phone Fax Patient Preferred    [facility] Skilled Nursing [address] [facility] [address] [phone] --                      Discharged on 3/4/2025 Admission date: 2/26/2025 - Discharge disposition: Intermediate Care       Destination       Service Provider Services Address Phone Fax Patient Preferred    [facility] Nursing Home [address] [facility] [address] [phone] --                             Demographic Summary       Row Name 06/02/25 0941       General Information    Admission Type inpatient    [facility]rived [facility]    Required Notices Provided Important Message from Medicare    Reason for Consult discharge planning    Preferred Language English       Contact Information    Permission Granted to Share Info With ;family/designee                   Functional Status       Row Name 06/02/25 0941       Functional Status    Usual Activity Tolerance fair    Current Activity Tolerance fair       Assessment of Health Literacy    How often do you have someone help you read hospital materials? Always    How often do you have problems learning about your medical condition because of difficulty understanding written information? Always    How often do you have a problem understanding what is told to you about your medical condition? Always    How confident are you filling out medical forms by yourself? Not at all    Health Literacy Low       Functional Status, IADL    Medications completely dependent    Meal Preparation completely dependent    Housekeeping completely dependent    Laundry completely dependent    Shopping completely dependent                               Maria T Hill RN

## 2025-06-02 NOTE — PLAN OF CARE
Problem: Adult Inpatient Plan of Care  Goal: Plan of Care Review  Outcome: Progressing  Flowsheets (Taken 6/2/2025 1820)  Progress: no change  Outcome Evaluation: Pt AxOx4, medications given as scheduled, Pt c/o of pain all over and (just not feeling good), VSS, continue plan of care  Plan of Care Reviewed With: patient  Goal: Patient-Specific Goal (Individualized)  Outcome: Progressing  Goal: Absence of Hospital-Acquired Illness or Injury  Outcome: Progressing  Intervention: Identify and Manage Fall Risk  Recent Flowsheet Documentation  Taken 6/2/2025 1800 by Ashutosh Jimenez RN  Safety Promotion/Fall Prevention:   activity supervised   assistive device/personal items within reach   clutter free environment maintained   fall prevention program maintained   nonskid shoes/slippers when out of bed   safety round/check completed  Taken 6/2/2025 1602 by Ashutosh Jimenez RN  Safety Promotion/Fall Prevention: activity supervised  Taken 6/2/2025 1413 by Ashutosh Jimenez RN  Safety Promotion/Fall Prevention:   safety round/check completed   activity supervised   assistive device/personal items within reach   clutter free environment maintained   fall prevention program maintained   nonskid shoes/slippers when out of bed  Taken 6/2/2025 1205 by Ashutosh Jimenez RN  Safety Promotion/Fall Prevention:   activity supervised   assistive device/personal items within reach   clutter free environment maintained   fall prevention program maintained   nonskid shoes/slippers when out of bed   safety round/check completed  Taken 6/2/2025 1008 by Ashutosh Jimenez, RN  Safety Promotion/Fall Prevention:   safety round/check completed   activity supervised   assistive device/personal items within reach   clutter free environment maintained   fall prevention program maintained   nonskid shoes/slippers when out of bed  Taken 6/2/2025 0814 by Ashutosh Jimenez, RN  Safety Promotion/Fall Prevention:   safety round/check completed   activity  supervised   assistive device/personal items within reach   clutter free environment maintained   fall prevention program maintained   nonskid shoes/slippers when out of bed  Intervention: Prevent Skin Injury  Recent Flowsheet Documentation  Taken 6/2/2025 1800 by Ashutosh Jimenez RN  Body Position: sitting up in bed  Taken 6/2/2025 1602 by Ashutosh Jimenez RN  Body Position: weight shifting  Taken 6/2/2025 1413 by Ashutosh Jimenez RN  Body Position: sitting up in bed  Taken 6/2/2025 1008 by Ashutosh Jimenez RN  Body Position: weight shifting  Taken 6/2/2025 0814 by Ashutosh Jimenez RN  Body Position: sitting up in bed  Goal: Optimal Comfort and Wellbeing  Outcome: Progressing  Intervention: Provide Person-Centered Care  Recent Flowsheet Documentation  Taken 6/2/2025 1413 by Ashutosh Jimenez RN  Trust Relationship/Rapport:   care explained   choices provided   emotional support provided   empathic listening provided   questions answered   questions encouraged   reassurance provided   thoughts/feelings acknowledged  Taken 6/2/2025 0814 by Ashutosh Jimenez RN  Trust Relationship/Rapport:   care explained   choices provided   emotional support provided   empathic listening provided   questions answered   questions encouraged   reassurance provided   thoughts/feelings acknowledged  Goal: Readiness for Transition of Care  Outcome: Progressing     Problem: Fall Injury Risk  Goal: Absence of Fall and Fall-Related Injury  Outcome: Progressing  Intervention: Identify and Manage Contributors  Recent Flowsheet Documentation  Taken 6/2/2025 1602 by Ashutosh Jimenez RN  Medication Review/Management: medications reviewed  Taken 6/2/2025 1413 by Ashutosh Jimenez RN  Medication Review/Management: medications reviewed  Taken 6/2/2025 1205 by Ashutosh Jimenez RN  Medication Review/Management: medications reviewed  Taken 6/2/2025 1008 by Ashutosh Jimenez RN  Medication Review/Management: medications reviewed  Taken 6/2/2025 0814 by Tony  ANUSHA Boykin  Medication Review/Management: medications reviewed  Intervention: Promote Injury-Free Environment  Recent Flowsheet Documentation  Taken 6/2/2025 1800 by Ashutosh Jimenez RN  Safety Promotion/Fall Prevention:   activity supervised   assistive device/personal items within reach   clutter free environment maintained   fall prevention program maintained   nonskid shoes/slippers when out of bed   safety round/check completed  Taken 6/2/2025 1602 by Ashutosh Jimenez RN  Safety Promotion/Fall Prevention: activity supervised  Taken 6/2/2025 1413 by Ashutosh Jimenez RN  Safety Promotion/Fall Prevention:   safety round/check completed   activity supervised   assistive device/personal items within reach   clutter free environment maintained   fall prevention program maintained   nonskid shoes/slippers when out of bed  Taken 6/2/2025 1205 by Ashutosh Jimenez RN  Safety Promotion/Fall Prevention:   activity supervised   assistive device/personal items within reach   clutter free environment maintained   fall prevention program maintained   nonskid shoes/slippers when out of bed   safety round/check completed  Taken 6/2/2025 1008 by Ashutosh Jimenez RN  Safety Promotion/Fall Prevention:   safety round/check completed   activity supervised   assistive device/personal items within reach   clutter free environment maintained   fall prevention program maintained   nonskid shoes/slippers when out of bed  Taken 6/2/2025 0814 by Ashutosh Jimenez RN  Safety Promotion/Fall Prevention:   safety round/check completed   activity supervised   assistive device/personal items within reach   clutter free environment maintained   fall prevention program maintained   nonskid shoes/slippers when out of bed     Problem: Pain Acute  Goal: Optimal Pain Control and Function  Outcome: Progressing  Intervention: Optimize Psychosocial Wellbeing  Recent Flowsheet Documentation  Taken 6/2/2025 1413 by Ashutosh Jimenez RN  Diversional Activities:  television  Taken 6/2/2025 0814 by Ashutosh Jimenez RN  Diversional Activities: television  Intervention: Prevent or Manage Pain  Recent Flowsheet Documentation  Taken 6/2/2025 1602 by Ashutosh Jimenez RN  Medication Review/Management: medications reviewed  Taken 6/2/2025 1413 by Ashutosh Jimenez RN  Medication Review/Management: medications reviewed  Taken 6/2/2025 1205 by Ashutosh Jimenez RN  Medication Review/Management: medications reviewed  Taken 6/2/2025 1008 by Ashutosh Jimenez RN  Medication Review/Management: medications reviewed  Taken 6/2/2025 0814 by Ashutosh Jimenez RN  Medication Review/Management: medications reviewed   Goal Outcome Evaluation:  Plan of Care Reviewed With: patient        Progress: no change  Outcome Evaluation: Pt AxOx4, medications given as scheduled, Pt c/o of pain all over and (just not feeling good), VSS, continue plan of care

## 2025-06-02 NOTE — THERAPY EVALUATION
Patient Name: Tony Leonard  : 1940    MRN: [id number]                              Today's Date: 2025       Admit Date: 2025    Visit Dx:     ICD-10-CM ICD-9-CM   1. Generalized abdominal pain  R10.84 789.07   2. Hypotension due to hypovolemia  E86.1 458.8     276.52   3. Chronic respiratory failure with hypoxia  J96.11 518.83     799.02     Patient Active Problem List   Diagnosis    Thrush, oral    ADD (attention deficit disorder)    Hemorrhoids    Bronchiectasis with acute lower respiratory infection    Diverticul disease small and large intestine, no perforati or abscess    Benign non-nodular prostatic hyperplasia without lower urinary tract symptoms    Gastroesophageal reflux disease    Malaise and fatigue    GERD (gastroesophageal reflux disease)    Environmental allergies    Hemoptysis    Dyslipidemia    Primary osteoarthritis involving multiple joints    Pneumonia of right lower lobe due to infectious organism    Abnormal EKG    COPD (chronic obstructive pulmonary disease)    Mild malnutrition    Acute on chronic respiratory failure with hypoxia    Fracture, intertrochanteric, right femur, closed, initial encounter    Chronic diastolic CHF (congestive heart failure)    Hematoma of frontal scalp    Postoperative anemia due to acute blood loss    Urinary retention    Hemorrhagic disorder due to circulating anticoagulants    History of fracture of right hip    Closed fracture of right hip with routine healing    Chronic low back pain with sciatica    Change in bowel function    Rectal bleeding    Prostate cancer    On home oxygen therapy    Acute viral bronchitis    Neuropathy    Plantar fasciitis    HTN (hypertension)    Bilateral lower extremity edema    Microscopic hematuria    Acute midline low back pain with right-sided sciatica    Spinal stenosis, lumbar region with neurogenic claudication    Compression deformity of vertebra    Rectal bleed    Esophagitis    Angiectasia    Hiatal  hernia    Overweight (BMI 25.0-29.9)    Leukocytosis    Bilateral hip pain    Elevated troponin level not due myocardial infarction    Nocturnal hypoxia    Pneumonia of right upper lobe due to infectious organism    NSTEMI (non-ST elevated myocardial infarction)    Chronic respiratory failure with hypoxia    Paroxysmal atrial fibrillation    Acute exacerbation of chronic obstructive pulmonary disease (COPD)    Anemia    Non-compliant patient    Hypokalemia    Spondylolisthesis of lumbar region    Elevated troponin    Rhabdomyolysis    Multiple contusions    Fall    Dizziness    Contusion of hip    Pulmonary embolism    COPD with acute exacerbation    Influenza A    Altered mental status    Atelectasis of left lung    Pneumonia due to E. coli    Generalized abdominal pain     Past Medical History:   Diagnosis Date    ADHD (attention deficit hyperactivity disorder)     Altered mental status, unspecified     Anemia, unspecified     Attention-deficit hyperactivity disorder, unspecified type     Benign prostatic hyperplasia without lower urinary tract symptoms     Body mass index 26.0-26.9, adult     Bronchiectasis     Bronchiectasis, uncomplicated     Chest pain, unspecified     CHF (congestive heart failure)     Chronic diastolic (congestive) heart failure     Chronic respiratory failure with hypoxia     Cognitive communication deficit     Colon polyp     COPD (chronic obstructive pulmonary disease)     Depression, unspecified     Diaphragmatic hernia without obstruction or gangrene     Diverticulosis     Diverticulosis of both small and large intestine without perforation or abscess without bleeding     Dorsalgia, unspecified     Encounter for immunization     Eosinophilic asthma     Essential (primary) hypertension     Gastroesophageal reflux disease without esophagitis     Hard of hearing     Hyperlipidemia, unspecified     Hypertensive heart disease with congestive heart failure     Hypoxemia     Influenza due to  other identified influenza virus with other respiratory manifestations     Insomnia, unspecified     Localized edema     Mild protein-calorie malnutrition     Muscle weakness (generalized)     On home oxygen therapy     2 LITERS    Other specified abnormal findings of blood chemistry     Overweight     Paroxysmal atrial fibrillation     Personal history of malignant neoplasm of prostate     Pneumonia     Polyneuropathy, unspecified     Prostate cancer 04/22/2019    Restless leg syndrome     Sleep related hypoventilation in conditions classified elsewhere     Spinal stenosis, lumbar region with neurogenic claudication 08/02/2022    Spinal stenosis, lumbar region, with neurogenic claudication     Weakness      Past Surgical History:   Procedure Laterality Date    BRONCHOSCOPY N/A 04/30/2016    Procedure: BRONCHOSCOPY with BAL of right lower lobe and left  lower lobe.  ;  Surgeon: Nando Diaz MD;  Location: [facility] ENDOSCOPY;  Service:     BRONCHOSCOPY N/A 04/28/2017    Procedure: BRONCHOSCOPY;  Surgeon: Katharina Salter MD;  Location: [facility] ENDOSCOPY;  Service:     BRONCHOSCOPY Bilateral 06/29/2017    Procedure: BRONCHOSCOPY WITH WASHINGS;  Surgeon: Katharina Salter MD;  Location: [facility] ENDOSCOPY;  Service:     BRONCHOSCOPY N/A 01/22/2018    Procedure: BRONCHOSCOPY AT BEDSIDE with BAL;  Surgeon: Katharina Salter MD;  Location: [facility] ENDOSCOPY;  Service:     BRONCHOSCOPY N/A 01/30/2018    Procedure: BRONCHOSCOPY with BAL and washing;  Surgeon: Shreyas Wild MD;  Location: [facility] ENDOSCOPY;  Service:     BRONCHOSCOPY Bilateral 04/24/2018    Procedure: BRONCHOSCOPY WITH WASHING;  Surgeon: Katharina Salter MD;  Location: [facility] ENDOSCOPY;  Service: Pulmonary    BRONCHOSCOPY N/A 12/28/2019    Procedure: BRONCHOSCOPY with bilateral washing;  Surgeon: Katharina Salter MD;  Location: [facility] ENDOSCOPY;  Service: Pulmonary    BRONCHOSCOPY Bilateral 01/04/2023    Procedure: BRONCHOSCOPY with lavage;  Surgeon: Katharina Salter MD;   Location: [facility] ENDOSCOPY;  Service: Pulmonary;  Laterality: Bilateral;  mucus plugging    CARDIAC CATHETERIZATION N/A 01/29/2023    Procedure: Left Heart Cath;  Surgeon: Johnnie Ang MD;  Location: [facility] CATH INVASIVE LOCATION;  Service: Cardiology;  Laterality: N/A;    CARDIAC CATHETERIZATION N/A 01/29/2023    Procedure: Coronary angiography;  Surgeon: Johnnie Ang MD;  Location: [facility] CATH INVASIVE LOCATION;  Service: Cardiology;  Laterality: N/A;    COLONOSCOPY  05/17/2013    eh, ih, tort, sig tics    COLONOSCOPY N/A 05/10/2019    Non-thrombosed external hemorrhoids found on perianal exam, Diverticulosis, Tortuous colon, One 5 mm polyp in the mid ascending colon, IH. Path: Tubular adenoma.     COLONOSCOPY N/A 09/24/2022    Procedure: COLONOSCOPY to cecum and TI with argon plasma coagulation.;  Surgeon: Bruce Black MD;  Location: [facility] ENDOSCOPY;  Service: Gastroenterology;  Laterality: N/A;  pre- GI bleeding  post- radiation proctitis, diverticulosis    ENDOSCOPY  03/19/2015    z line irreg, hh    ENDOSCOPY N/A 09/24/2022    Procedure: ESOPHAGOGASTRODUODENOSCOPY;  Surgeon: Bruce Black MD;  Location: [facility] ENDOSCOPY;  Service: Gastroenterology;  Laterality: N/A;  pre- GI bleeding  post- esophagitis, hiatal hernia    HIP OPEN REDUCTION Right 01/17/2018    Procedure: HIP OPEN REDUCTION INTERNAL FIXATION WITH DYNAMIC HIP SCREW;  Surgeon: Les Black MD;  Location: [facility] MAIN OR;  Service:     SPINE SURGERY      TONSILLECTOMY      TOTAL HIP ARTHROPLASTY REVISION Right 09/23/2019    Procedure: HIP  REVISION RIGHT;  Surgeon: Isauro Warner MD;  Location: [facility] MAIN OR;  Service: Orthopedics      General Information       Row Name 06/02/25 1115          Physical Therapy Time and Intention    Document Type evaluation  -EF     Mode of Treatment physical therapy  -EF       Row Name 06/02/25 1115          General Information    Patient Profile Reviewed yes  -EF     Prior Level of  Function --  pt reports independence with rwx at baseline; has been requiring more help from past few weeks due to R hip pain  -EF     Existing Precautions/Restrictions fall  -EF     Barriers to Rehab previous functional deficit  -EF       Row Name 06/02/25 1115          Living Environment    Current Living Arrangements extended care facility  -EF     People in Home facility resident  -EF       Row Name 06/02/25 1115          Cognition    Orientation Status (Cognition) oriented x 3  -EF       Row Name 06/02/25 1115          Safety Issues/Impairments Affecting Functional Mobility    Impairments Affecting Function (Mobility) balance;endurance/activity tolerance;postural/trunk control;strength;pain;range of motion (ROM)  -EF               User Key  (r) = Recorded By, (t) = Taken By, (c) = Cosigned By      Initials Name Provider Type    Michelle Dukes PT Physical Therapist                   Mobility       Row Name 06/02/25 1125          Bed Mobility    Bed Mobility supine-sit;sit-supine;scooting/bridging  -EF     Scooting/Bridging [place] (Bed Mobility) dependent (less than 25% patient effort);2 person assist  -EF     Supine-Sit [place] (Bed Mobility) maximum assist (25% patient effort)  -EF     Sit-Supine [place] (Bed Mobility) maximum assist (25% patient effort)  -EF     Assistive Device (Bed Mobility) head of bed elevated;bed rails  -EF     Comment, (Bed Mobility) Increased time required; max A required with trunk. Pt only able to tolerate sitting up partially due to R hip pain. Sits with posterior lean and L lateral flexion.  -EF       Row Name 06/02/25 1125          Transfers    Comment, (Transfers) Unable to assess.  -EF               User Key  (r) = Recorded By, (t) = Taken By, (c) = Cosigned By      Initials Name Provider Type    Michelle Dukes PT Physical Therapist                   Obj/Interventions       Row Name 06/02/25 1126          Range of Motion Comprehensive    General  Range of Motion lower extremity range of motion deficits identified  -EF     Comment, General Range of Motion Unable to assess R LE due to R hip pain. L LE appears grossly WFL.  -EF       Row Name 06/02/25 1126          Strength Comprehensive (MMT)    General Manual Muscle Testing (MMT) Assessment lower extremity strength deficits identified;upper extremity strength deficits identified;neck/trunk strength deficits identified  -EF     Comment, General Manual Muscle Testing (MMT) Assessment generalized weakness noted functionally  -EF       Row Name 06/02/25 1126          Balance    Balance Assessment sitting static balance  -EF     Static Sitting Balance moderate assist  -EF     Position, Sitting Balance sitting edge of bed  -EF     Comment, Balance Posterior and L lateral lean; unable to tolerate coming to full sit at EOB due to R hip pain  -EF       Row Name 06/02/25 1126          Sensory Assessment (Somatosensory)    Sensory Assessment (Somatosensory) not tested  -EF               User Key  (r) = Recorded By, (t) = Taken By, (c) = Cosigned By      Initials Name Provider Type    EF Michelle Valera, PT Physical Therapist                   Goals/Plan       Row Name 06/02/25 1143          Bed Mobility Goal 1 (PT)    Activity/Assistive Device (Bed Mobility Goal 1, PT) sit to supine/supine to sit  -EF     [place] Level/Cues Needed (Bed Mobility Goal 1, PT) minimum assist (75% or more patient effort)  -EF     Time Frame (Bed Mobility Goal 1, PT) 2 weeks;long term goal (LTG)  -EF     Progress/Outcomes (Bed Mobility Goal 1, PT) goal ongoing  -EF       Row Name 06/02/25 1143          Transfer Goal 1 (PT)    Activity/Assistive Device (Transfer Goal 1, PT) sit-to-stand/stand-to-sit;walker, rolling  -EF     [place] Level/Cues Needed (Transfer Goal 1, PT) minimum assist (75% or more patient effort)  -EF     Time Frame (Transfer Goal 1, PT) 2 weeks;long term goal (LTG)  -EF     Progress/Outcome (Transfer Goal 1, PT)  goal ongoing  -EF       Row Name 06/02/25 1143          Gait Training Goal 1 (PT)    Activity/Assistive Device (Gait Training Goal 1, PT) gait (walking locomotion);walker, rolling  -EF     [place] Level (Gait Training Goal 1, PT) minimum assist (75% or more patient effort)  -EF     Distance (Gait Training Goal 1, PT) 20'  -EF     Time Frame (Gait Training Goal 1, PT) 2 weeks;long term goal (LTG)  -EF     Progress/Outcome (Gait Training Goal 1, PT) goal ongoing  -EF       Row Name 06/02/25 1143          Therapy Assessment/Plan (PT)    Planned Therapy Interventions (PT) balance training;bed mobility training;gait training;home exercise program;patient/family education;ROM (range of motion);strengthening;postural re-education;transfer training  -EF               User Key  (r) = Recorded By, (t) = Taken By, (c) = Cosigned By      Initials Name Provider Type    EF Michelle Valera, PT Physical Therapist                   Clinical Impression       Row Name 06/02/25 1136          Pain    Pretreatment Pain Rating 7/10  -EF     Posttreatment Pain Rating 7/10  -EF     Pain Location hip  -EF     Pain Side/Orientation right  -EF     Pain Management Interventions positioning techniques utilized  -EF     Response to Pain Interventions activity participation with increased pain  -EF       Row Name 06/02/25 1136          Plan of Care Review    Plan of Care Reviewed With patient  -EF     Outcome Evaluation Pt is an 84 yo male who presents from [facility] with abd pain, PMH of COPD, HTN. Pt also reports R hip pain that began a few weeks ago and has been limiting mobility. Pt reports independence with rwx at baseline; however, states he has been requiring more assist recently due to hip pain. Upon exam, pt demos weakness, pain, impaired balance, and decreased endurance limiting mobility. Pt required max A with bed mobility and was only able to partially transition to EOB due to R hip pain limiting sitting posture and tolerance. Pt  assisted back into bed and further activity deferred. PT will continue to follow for ongoing strengthening and progression of mobility as able. Pt may benefit from further PT upon return to facility to assist with return to baseline mobility.  -EF       Row Name 06/02/25 1136          Therapy Assessment/Plan (PT)    Rehab Potential (PT) good  -EF     Criteria for Skilled Interventions Met (PT) meets criteria;yes;skilled treatment is necessary  -EF     Therapy Frequency (PT) 3 times/wk  -EF       Row Name 06/02/25 1136          Positioning and Restraints    Pre-Treatment Position in bed  -EF     Post Treatment Position bed  -EF     In Bed fowlers;side lying left;call light within reach;encouraged to call for assist;exit alarm on;notified nsg;pillow between legs  -EF               User Key  (r) = Recorded By, (t) = Taken By, (c) = Cosigned By      Initials Name Provider Type    Michelle Dukes PT Physical Therapist                   Outcome Measures       Row Name 06/02/25 1144 06/02/25 0026       How much help from another person do you currently need...    Turning from your back to your side while in flat bed without using bedrails? 3  -EF 4  -BB    Moving from lying on back to sitting on the side of a flat bed without bedrails? 2  -EF 2  -BB    Moving to and from a bed to a chair (including a wheelchair)? 1  -EF 2  -BB    Standing up from a chair using your arms (e.g., wheelchair, bedside chair)? 1  -EF 3  -BB    Climbing 3-5 steps with a railing? 1  -EF --    To walk in hospital room? 1  -EF --    AM-PAC 6 Clicks Score (PT) 9  -EF --              User Key  (r) = Recorded By, (t) = Taken By, (c) = Cosigned By      Initials Name Provider Type    Michelle Dukes PT Physical Therapist    Mark Park RN Registered Nurse                                 Physical Therapy Education       Title: PT OT SLP Therapies (In Progress)       Topic: Physical Therapy (In Progress)       Point: Mobility  training (Done)       Learning Progress Summary            Patient Acceptance, E, VU,NR by  at 6/2/2025 1145                      Point: Home exercise program (Not Started)       Learner Progress:  Not documented in this visit.              Point: Body mechanics (Not Started)       Learner Progress:  Not documented in this visit.              Point: Precautions (Not Started)       Learner Progress:  Not documented in this visit.                              User Key       Initials Effective Dates Name Provider Type Discipline     05/31/24 -  Michelle Valera, PT Physical Therapist PT                  PT Recommendation and Plan  Planned Therapy Interventions (PT): balance training, bed mobility training, gait training, home exercise program, patient/family education, ROM (range of motion), strengthening, postural re-education, transfer training  Outcome Evaluation: Pt is an 86 yo male who presents [facility] with abd pain, PMH of COPD, HTN. Pt also reports R hip pain that began a few weeks ago and has been limiting mobility. Pt reports independence with rwx at baseline; however, states he has been requiring more assist recently due to hip pain. Upon exam, pt demos weakness, pain, impaired balance, and decreased endurance limiting mobility. Pt required max A with bed mobility and was only able to partially transition to EOB due to R hip pain limiting sitting posture and tolerance. Pt assisted back into bed and further activity deferred. PT will continue to follow for ongoing strengthening and progression of mobility as able. Pt may benefit from further PT upon return to facility to assist with return to baseline mobility.     Time Calculation:         PT Charges       Row Name 06/02/25 1146             Time Calculation    Start Time 0909  -EF      Stop Time 0930  -      Time Calculation (min) 21 min  -      PT Received On 06/02/25  -EF      PT - Next Appointment 06/04/25  -      PT Goal Re-Cert Due Date  06/16/25  -EF         Time Calculation- PT    Total Timed Code Minutes- PT 12 minute(s)  -EF         Timed Charges    71793 - PT Therapeutic Activity Minutes 12  -EF         Total Minutes    Timed Charges Total Minutes 12  -EF       Total Minutes 12  -EF                User Key  (r) = Recorded By, (t) = Taken By, (c) = Cosigned By      Initials Name Provider Type    EF Michelle Valera, PT Physical Therapist                  Therapy Charges for Today       Code Description Service Date Service Provider Modifiers Qty    20312302274 HC PT THERAPEUTIC ACT EA 15 MIN 6/2/2025 Michelle Valera, PT GP 1    20878526198 HC PT EVAL MOD COMPLEXITY 3 6/2/2025 Michelle Valera, PT GP 1            PT G-Codes  AM-PAC 6 Clicks Score (PT): 9  PT Discharge Summary  Anticipated Discharge Disposition (PT): skilled nursing facility, extended care facility    Michelle Valera, PT  6/2/2025

## 2025-06-02 NOTE — PLAN OF CARE
Goal Outcome Evaluation:  Plan of Care Reviewed With: patient           Outcome Evaluation: Pt is an 86 yo male who presents [facility] with abd pain, PMH of COPD, HTN. Pt also reports R hip pain that began a few weeks ago and has been limiting mobility. Pt reports independence with rwx at baseline; however, states he has been requiring more assist recently due to hip pain. Upon exam, pt demos weakness, pain, impaired balance, and decreased endurance limiting mobility. Pt required max A with bed mobility and was only able to partially transition to EOB due to R hip pain limiting sitting posture and tolerance. Pt assisted back into bed and further activity deferred. PT will continue to follow for ongoing strengthening and progression of mobility as able. Pt may benefit from further PT upon return to facility to assist with return to baseline mobility.    Anticipated Discharge Disposition (PT): skilled nursing facility, extended care facility

## 2025-06-02 NOTE — PROGRESS NOTES
Name: Tony Leonard ADMIT: 2025   : 1940  PCP: Alfonso Goldstein MD    MRN: [id number] LOS: 1 days   AGE/SEX: 85 y.o. male  ROOM: [facility]     Subjective   Subjective   No acute events. No new complaints. No family at bedside.     Objective   Objective   Vital Signs  Temp:  [97.2 °F (36.2 °C)-97.7 °F (36.5 °C)] 97.2 °F (36.2 °C)  Heart Rate:  [] 96  Resp:  [16-18] 18  BP: ()/(59-90) 108/63  SpO2:  [93 %-100 %] 97 %  on  Flow (L/min) (Oxygen Therapy):  [2] 2;   Device (Oxygen Therapy): nasal cannula  Body mass index is 24.24 kg/m².  Physical Exam  Vitals and nursing note reviewed.   Constitutional:       General: He is not in acute distress.     Appearance: He is ill-appearing (chronically). He is not toxic-appearing or diaphoretic.   HENT:      Head: Normocephalic and atraumatic.      Nose: Nose normal.      Mouth/Throat:      Mouth: Mucous membranes are moist.      Pharynx: Oropharynx is clear.   Eyes:      Conjunctiva/sclera: Conjunctivae normal.      Pupils: Pupils are equal, round, and reactive to light.   Cardiovascular:      Rate and Rhythm: Normal rate and regular rhythm.      Pulses: Normal pulses.   Pulmonary:      Effort: Pulmonary effort is normal.      Breath sounds: Normal breath sounds.   Abdominal:      General: Bowel sounds are normal. There is no distension.      Palpations: Abdomen is soft.      Tenderness: There is abdominal tenderness (mild, diffuse). There is no guarding or rebound.   Musculoskeletal:         General: No swelling or tenderness.      Cervical back: Neck supple.   Skin:     General: Skin is warm and dry.      Capillary Refill: Capillary refill takes less than 2 seconds.   Neurological:      Mental Status: He is alert.   Psychiatric:         Mood and Affect: Mood normal.         Behavior: Behavior normal.       Results Review     I reviewed the patient's new clinical results.  Results from last 7 days   Lab Units 25  0330 25  1238   WBC  "10*3/mm3 5.51 5.66   HEMOGLOBIN g/dL 12.8* 14.9   PLATELETS 10*3/mm3 277 289     Results from last 7 days   Lab Units 06/02/25  0330 06/01/25  1238   SODIUM mmol/L 137 137   POTASSIUM mmol/L 3.2* 3.4*   CHLORIDE mmol/L 99 96*   CO2 mmol/L 21.5* 23.0   BUN mg/dL 11.0 11.0   CREATININE mg/dL 0.71* 0.89   GLUCOSE mg/dL 69 86   EGFR mL/min/1.73 88.8 82.9     Results from last 7 days   Lab Units 06/01/25  1238   ALBUMIN g/dL 3.4*   BILIRUBIN mg/dL 0.5   ALK PHOS U/L 86   AST (SGOT) U/L 15   ALT (SGPT) U/L 8     Results from last 7 days   Lab Units 06/02/25  0330 06/01/25  1238   CALCIUM mg/dL 9.0 9.7   ALBUMIN g/dL  --  3.4*   MAGNESIUM mg/dL  --  1.7     Results from last 7 days   Lab Units 06/01/25  1238   PROCALCITONIN ng/mL 0.06   LACTATE mmol/L 1.3     No results found for: \"HGBA1C\", \"POCGLU\"    No radiology results for the last day    I have personally reviewed all medications:  Scheduled Medications  arformoterol, 15 mcg, Nebulization, BID - RT   And  budesonide, 0.5 mg, Nebulization, BID - RT   And  revefenacin, 175 mcg, Nebulization, Daily - RT  ferrous sulfate, 325 mg, Oral, Daily With Breakfast  FLUoxetine, 20 mg, Oral, Daily  gabapentin, 300 mg, Oral, TID  guaiFENesin, 1,200 mg, Oral, BID  ipratropium-albuterol, 3 mL, Nebulization, 4x Daily - RT  multivitamin with minerals, 1 tablet, Oral, Daily  pantoprazole, 40 mg, Intravenous, BID AC  potassium chloride, 20 mEq, Oral, Daily    Infusions  sodium chloride, 75 mL/hr, Last Rate: 75 mL/hr (06/02/25 0108)    Diet  Diet: Cardiac; Healthy Heart (2-3 Na+); Fluid Consistency: Thin (IDDSI 0)    I have personally reviewed:  [x]  Laboratory   []  Microbiology   [x]  Radiology   [x]  EKG/Telemetry  []  Cardiology/Vascular   []  Pathology    [x]  Records       Assessment/Plan     Active Hospital Problems    Diagnosis  POA    **Generalized abdominal pain [R10.84]  Yes    Constipation [K59.00]  Yes    Chronic respiratory failure with hypoxia [J96.11]  Yes    Spinal " stenosis, lumbar region with neurogenic claudication [M48.062]  Yes    HTN (hypertension) [I10]  Yes    Neuropathy [G62.9]  Yes    Chronic diastolic CHF (congestive heart failure) [I50.32]  Yes    COPD (chronic obstructive pulmonary disease) [J44.9]  Yes    Diverticul disease small and large intestine, no perforati or abscess [K57.50]  Yes    Gastroesophageal reflux disease [K21.9]  Yes      Resolved Hospital Problems   No resolved problems to display.   Generalized Abdominal Pain  - no structural issues on CT abdomen/pelvis, suspected due to constipation  - bowel regimen started, control pain and nausea in the mean time  - appreciate GI recommendations    HTN/Chronic Diastolic CHF  - BP acceptable, looks dry  - continue timed course of gentle IV fluids  - monitor volume status  - hold antihypertensives and diuretic for now    COPD  - no exacerbation  - continue current regimen    GERD  - continue ppi    Hypokalemia  - replace K+ per protocol    SCDs for DVT prophylaxis.  Full code.  Discussed with patient, nursing staff, and care team on multidisciplinary rounds.  Anticipate discharge to SNU facility in 1-2 days.  Expected Discharge Date: 6/4/2025; Expected Discharge Time:       Juan Howell MD  [facility]  06/02/25  12:56 EDT

## 2025-06-03 LAB
ANION GAP SERPL CALCULATED.3IONS-SCNC: 12 MMOL/L (ref 5–15)
BASOPHILS # BLD AUTO: 0.02 10*3/MM3 (ref 0–0.2)
BASOPHILS NFR BLD AUTO: 0.4 % (ref 0–1.5)
BUN SERPL-MCNC: 8 MG/DL (ref 8–23)
BUN/CREAT SERPL: 11.9 (ref 7–25)
CALCIUM SPEC-SCNC: 9.1 MG/DL (ref 8.6–10.5)
CHLORIDE SERPL-SCNC: 105 MMOL/L (ref 98–107)
CO2 SERPL-SCNC: 21 MMOL/L (ref 22–29)
CREAT SERPL-MCNC: 0.67 MG/DL (ref 0.76–1.27)
DEPRECATED RDW RBC AUTO: 41.7 FL (ref 37–54)
EGFRCR SERPLBLD CKD-EPI 2021: 90.4 ML/MIN/1.73
EOSINOPHIL # BLD AUTO: 0 10*3/MM3 (ref 0–0.4)
EOSINOPHIL NFR BLD AUTO: 0 % (ref 0.3–6.2)
ERYTHROCYTE [DISTWIDTH] IN BLOOD BY AUTOMATED COUNT: 12.2 % (ref 12.3–15.4)
GLUCOSE SERPL-MCNC: 86 MG/DL (ref 65–99)
HCT VFR BLD AUTO: 40.2 % (ref 37.5–51)
HGB BLD-MCNC: 13.2 G/DL (ref 13–17.7)
IMM GRANULOCYTES # BLD AUTO: 0.01 10*3/MM3 (ref 0–0.05)
IMM GRANULOCYTES NFR BLD AUTO: 0.2 % (ref 0–0.5)
LYMPHOCYTES # BLD AUTO: 1.58 10*3/MM3 (ref 0.7–3.1)
LYMPHOCYTES NFR BLD AUTO: 29.6 % (ref 19.6–45.3)
MCH RBC QN AUTO: 31 PG (ref 26.6–33)
MCHC RBC AUTO-ENTMCNC: 32.8 G/DL (ref 31.5–35.7)
MCV RBC AUTO: 94.4 FL (ref 79–97)
MONOCYTES # BLD AUTO: 0.56 10*3/MM3 (ref 0.1–0.9)
MONOCYTES NFR BLD AUTO: 10.5 % (ref 5–12)
NEUTROPHILS NFR BLD AUTO: 3.17 10*3/MM3 (ref 1.7–7)
NEUTROPHILS NFR BLD AUTO: 59.3 % (ref 42.7–76)
NRBC BLD AUTO-RTO: 0 /100 WBC (ref 0–0.2)
PLATELET # BLD AUTO: 286 10*3/MM3 (ref 140–450)
PMV BLD AUTO: 9.1 FL (ref 6–12)
POTASSIUM SERPL-SCNC: 3.5 MMOL/L (ref 3.5–5.2)
POTASSIUM SERPL-SCNC: 4.8 MMOL/L (ref 3.5–5.2)
QT INTERVAL: 387 MS
QTC INTERVAL: 460 MS
RBC # BLD AUTO: 4.26 10*6/MM3 (ref 4.14–5.8)
SODIUM SERPL-SCNC: 138 MMOL/L (ref 136–145)
WBC NRBC COR # BLD AUTO: 5.34 10*3/MM3 (ref 3.4–10.8)

## 2025-06-03 PROCEDURE — 25010000002 ONDANSETRON PER 1 MG: Performed by: INTERNAL MEDICINE

## 2025-06-03 PROCEDURE — 94799 UNLISTED PULMONARY SVC/PX: CPT

## 2025-06-03 PROCEDURE — 94761 N-INVAS EAR/PLS OXIMETRY MLT: CPT

## 2025-06-03 PROCEDURE — 63710000001 REVEFENACIN 175 MCG/3ML SOLUTION: Performed by: INTERNAL MEDICINE

## 2025-06-03 PROCEDURE — 85025 COMPLETE CBC W/AUTO DIFF WBC: CPT | Performed by: INTERNAL MEDICINE

## 2025-06-03 PROCEDURE — 84132 ASSAY OF SERUM POTASSIUM: CPT | Performed by: INTERNAL MEDICINE

## 2025-06-03 PROCEDURE — 80048 BASIC METABOLIC PNL TOTAL CA: CPT | Performed by: INTERNAL MEDICINE

## 2025-06-03 PROCEDURE — 94664 DEMO&/EVAL PT USE INHALER: CPT

## 2025-06-03 RX ORDER — POTASSIUM CHLORIDE 1500 MG/1
40 TABLET, EXTENDED RELEASE ORAL EVERY 4 HOURS
Status: COMPLETED | OUTPATIENT
Start: 2025-06-03 | End: 2025-06-03

## 2025-06-03 RX ORDER — DIPHENHYDRAMINE HYDROCHLORIDE, ZINC ACETATE 2; .1 G/100G; G/100G
1 CREAM TOPICAL 3 TIMES DAILY
Status: DISCONTINUED | OUTPATIENT
Start: 2025-06-03 | End: 2025-06-04 | Stop reason: HOSPADM

## 2025-06-03 RX ORDER — CETIRIZINE HYDROCHLORIDE 10 MG/1
10 TABLET ORAL DAILY
Status: DISCONTINUED | OUTPATIENT
Start: 2025-06-03 | End: 2025-06-04 | Stop reason: HOSPADM

## 2025-06-03 RX ORDER — HYDROXYZINE HYDROCHLORIDE 10 MG/1
10 TABLET, FILM COATED ORAL ONCE
Status: COMPLETED | OUTPATIENT
Start: 2025-06-03 | End: 2025-06-03

## 2025-06-03 RX ADMIN — GABAPENTIN 300 MG: 300 CAPSULE ORAL at 16:29

## 2025-06-03 RX ADMIN — GUAIFENESIN 1200 MG: 600 TABLET, EXTENDED RELEASE ORAL at 08:51

## 2025-06-03 RX ADMIN — GABAPENTIN 300 MG: 300 CAPSULE ORAL at 08:52

## 2025-06-03 RX ADMIN — DIPHENHYDRAMINE HYDROCHLORIDE, ZINC ACETATE 1 APPLICATION: 2; .1 CREAM TOPICAL at 16:29

## 2025-06-03 RX ADMIN — FERROUS SULFATE TAB 325 MG (65 MG ELEMENTAL FE) 325 MG: 325 (65 FE) TAB at 08:51

## 2025-06-03 RX ADMIN — Medication 1 TABLET: at 08:51

## 2025-06-03 RX ADMIN — BISACODYL 5 MG: 5 TABLET, COATED ORAL at 13:12

## 2025-06-03 RX ADMIN — POTASSIUM CHLORIDE 20 MEQ: 1.5 POWDER, FOR SOLUTION ORAL at 08:50

## 2025-06-03 RX ADMIN — PANTOPRAZOLE SODIUM 40 MG: 40 INJECTION, POWDER, FOR SOLUTION INTRAVENOUS at 17:21

## 2025-06-03 RX ADMIN — ACETAMINOPHEN 650 MG: 650 SOLUTION ORAL at 16:29

## 2025-06-03 RX ADMIN — CETIRIZINE HYDROCHLORIDE 10 MG: 10 TABLET, FILM COATED ORAL at 13:12

## 2025-06-03 RX ADMIN — IPRATROPIUM BROMIDE AND ALBUTEROL SULFATE 3 ML: .5; 3 SOLUTION RESPIRATORY (INHALATION) at 11:47

## 2025-06-03 RX ADMIN — GABAPENTIN 300 MG: 300 CAPSULE ORAL at 21:52

## 2025-06-03 RX ADMIN — ARFORMOTEROL TARTRATE 15 MCG: 15 SOLUTION RESPIRATORY (INHALATION) at 20:51

## 2025-06-03 RX ADMIN — BUDESONIDE 0.5 MG: 0.5 INHALANT RESPIRATORY (INHALATION) at 20:51

## 2025-06-03 RX ADMIN — REVEFENACIN 175 MCG: 175 SOLUTION RESPIRATORY (INHALATION) at 07:03

## 2025-06-03 RX ADMIN — Medication 2.5 MG: at 22:35

## 2025-06-03 RX ADMIN — IPRATROPIUM BROMIDE AND ALBUTEROL SULFATE 3 ML: .5; 3 SOLUTION RESPIRATORY (INHALATION) at 15:30

## 2025-06-03 RX ADMIN — POTASSIUM CHLORIDE 40 MEQ: 1500 TABLET, EXTENDED RELEASE ORAL at 13:12

## 2025-06-03 RX ADMIN — BUDESONIDE 0.5 MG: 0.5 INHALANT RESPIRATORY (INHALATION) at 07:03

## 2025-06-03 RX ADMIN — SENNOSIDES AND DOCUSATE SODIUM 2 TABLET: 50; 8.6 TABLET ORAL at 08:51

## 2025-06-03 RX ADMIN — IPRATROPIUM BROMIDE AND ALBUTEROL SULFATE 3 ML: .5; 3 SOLUTION RESPIRATORY (INHALATION) at 20:51

## 2025-06-03 RX ADMIN — PANTOPRAZOLE SODIUM 40 MG: 40 INJECTION, POWDER, FOR SOLUTION INTRAVENOUS at 06:37

## 2025-06-03 RX ADMIN — ONDANSETRON 4 MG: 2 INJECTION, SOLUTION INTRAMUSCULAR; INTRAVENOUS at 08:53

## 2025-06-03 RX ADMIN — POTASSIUM CHLORIDE 40 MEQ: 1500 TABLET, EXTENDED RELEASE ORAL at 08:51

## 2025-06-03 RX ADMIN — FLUOXETINE 20 MG: 20 CAPSULE ORAL at 08:51

## 2025-06-03 RX ADMIN — IPRATROPIUM BROMIDE AND ALBUTEROL SULFATE 3 ML: .5; 3 SOLUTION RESPIRATORY (INHALATION) at 07:03

## 2025-06-03 RX ADMIN — POLYETHYLENE GLYCOL 3350 17 G: 17 POWDER, FOR SOLUTION ORAL at 08:50

## 2025-06-03 RX ADMIN — ARFORMOTEROL TARTRATE 15 MCG: 15 SOLUTION RESPIRATORY (INHALATION) at 07:03

## 2025-06-03 RX ADMIN — GUAIFENESIN 1200 MG: 600 TABLET, EXTENDED RELEASE ORAL at 21:52

## 2025-06-03 RX ADMIN — HYDROXYZINE HYDROCHLORIDE 10 MG: 10 TABLET ORAL at 21:53

## 2025-06-03 NOTE — CASE MANAGEMENT/SOCIAL WORK
Continued Stay Note  [facility]     Patient Name: Tony Leonard  MRN: [id number]  Today's Date: 6/3/2025    Admit Date: 6/1/2025    Plan: [facility]   Discharge Plan       Row Name 06/03/25 1303       Plan    Plan [facility]    Patient/Family in Agreement with Plan yes    Plan Comments CCP received call from Edward, pts son. Introduced self and role of CCP. Edward confirmed pt is from [facility] and pt is to return at [place]. Edjustin stated pt usually transports via [facility]. Partial packet in CCP office, pharmacy updated to [facility] in Epic. Karie TAVARES/CCP                   Discharge Codes    No documentation.                 Expected Discharge Date and Time       Expected Discharge Date Expected Discharge Time    Jun 4, 2025               Maria T Hill RN

## 2025-06-03 NOTE — PLAN OF CARE
Goal Outcome Evaluation:  Plan of Care Reviewed With: patient        Progress: no change      Problem: Adult Inpatient Plan of Care  Goal: Plan of Care Review  6/3/2025 1820 by Ashutosh Jimenez RN  Outcome: Progressing  Flowsheets (Taken 6/3/2025 1820)  Progress: no change  Outcome Evaluation: Pt AxOx4, rash (anti-itch cream), medictions given as scheduled, RA, VSS, continue plan of care  Plan of Care Reviewed With: patient  6/3/2025 1819 by Ashutosh Jimenez RN  Outcome: Progressing  Flowsheets  Taken 6/3/2025 1819  Plan of Care Reviewed With: patient  Taken 6/2/2025 1820  Outcome Evaluation: Pt AxOx4, medications given as scheduled, Pt c/o of pain all over and (just not feeling good), VSS, continue plan of care  Goal: Patient-Specific Goal (Individualized)  6/3/2025 1820 by Ashutosh Jimenez RN  Outcome: Progressing  6/3/2025 1819 by Ashutosh Jimenez RN  Outcome: Progressing  Goal: Absence of Hospital-Acquired Illness or Injury  6/3/2025 1820 by Ashutosh Jimenez RN  Outcome: Progressing  6/3/2025 1819 by Ashutosh Jimenez RN  Outcome: Progressing  Goal: Optimal Comfort and Wellbeing  6/3/2025 1820 by Ashutosh Jimenez RN  Outcome: Progressing  6/3/2025 1819 by Ashutosh Jimenez RN  Outcome: Progressing  Goal: Readiness for Transition of Care  6/3/2025 1820 by Ashutosh Jimenez RN  Outcome: Progressing  6/3/2025 1819 by Ashutosh Jimenez RN  Outcome: Progressing     Problem: Fall Injury Risk  Goal: Absence of Fall and Fall-Related Injury  6/3/2025 1820 by Ashutosh Jimenez RN  Outcome: Progressing  6/3/2025 1819 by Ashutosh Jimenez RN  Outcome: Progressing     Problem: Pain Acute  Goal: Optimal Pain Control and Function  6/3/2025 1820 by Ashutosh Jimenez RN  Outcome: Progressing  6/3/2025 1819 by Ashutosh Jimenez RN  Outcome: Progressing

## 2025-06-03 NOTE — PROGRESS NOTES
Name: Tony Leonard ADMIT: 2025   : 1940  PCP: Alfonso Goldstein MD    MRN: [id number] LOS: 2 days   AGE/SEX: 85 y.o. male  ROOM: [facility]/     Subjective   Subjective   No acute events. Patient complains of pruritus in the legs. His abdominal pain is better and he denies nausea. +BM. No family at bedside.     Objective   Objective   Vital Signs  Temp:  [97.3 °F (36.3 °C)-98.1 °F (36.7 °C)] 97.9 °F (36.6 °C)  Heart Rate:  [] 93  Resp:  [16-18] 18  BP: (112-119)/(58-69) 112/62  SpO2:  [95 %-100 %] 100 %  on  Flow (L/min) (Oxygen Therapy):  [2] 2;   Device (Oxygen Therapy): nasal cannula  Body mass index is 24.35 kg/m².  Physical Exam  Vitals and nursing note reviewed.   Constitutional:       General: He is not in acute distress.     Appearance: He is ill-appearing (chronically). He is not toxic-appearing or diaphoretic.   HENT:      Head: Normocephalic and atraumatic.      Nose: Nose normal.      Mouth/Throat:      Mouth: Mucous membranes are moist.      Pharynx: Oropharynx is clear.   Eyes:      Conjunctiva/sclera: Conjunctivae normal.      Pupils: Pupils are equal, round, and reactive to light.   Cardiovascular:      Rate and Rhythm: Normal rate and regular rhythm.      Pulses: Normal pulses.   Pulmonary:      Effort: Pulmonary effort is normal.      Breath sounds: Normal breath sounds.   Abdominal:      General: Bowel sounds are normal. There is no distension.      Palpations: Abdomen is soft.      Tenderness: There is no abdominal tenderness.   Musculoskeletal:         General: No swelling or tenderness.      Cervical back: Neck supple.   Skin:     General: Skin is warm and dry.      Capillary Refill: Capillary refill takes less than 2 seconds.      Findings: Rash (raised maculopapular rash on BLE and lower back) present.   Neurological:      Mental Status: He is alert.   Psychiatric:         Mood and Affect: Mood normal.         Behavior: Behavior normal.       Results Review     I  "reviewed the patient's new clinical results.  Results from last 7 days   Lab Units 06/03/25  0438 06/02/25  0330 06/01/25  1238   WBC 10*3/mm3 5.34 5.51 5.66   HEMOGLOBIN g/dL 13.2 12.8* 14.9   PLATELETS 10*3/mm3 286 277 289     Results from last 7 days   Lab Units 06/03/25  0438 06/02/25  1442 06/02/25  0330 06/01/25  1238   SODIUM mmol/L 138  --  137 137   POTASSIUM mmol/L 3.5 3.8 3.2* 3.4*   CHLORIDE mmol/L 105  --  99 96*   CO2 mmol/L 21.0*  --  21.5* 23.0   BUN mg/dL 8.0  --  11.0 11.0   CREATININE mg/dL 0.67*  --  0.71* 0.89   GLUCOSE mg/dL 86  --  69 86   EGFR mL/min/1.73 90.4  --  88.8 82.9     Results from last 7 days   Lab Units 06/01/25  1238   ALBUMIN g/dL 3.4*   BILIRUBIN mg/dL 0.5   ALK PHOS U/L 86   AST (SGOT) U/L 15   ALT (SGPT) U/L 8     Results from last 7 days   Lab Units 06/03/25  0438 06/02/25  0330 06/01/25  1238   CALCIUM mg/dL 9.1 9.0 9.7   ALBUMIN g/dL  --   --  3.4*   MAGNESIUM mg/dL  --   --  1.7     Results from last 7 days   Lab Units 06/01/25  1238   PROCALCITONIN ng/mL 0.06   LACTATE mmol/L 1.3     No results found for: \"HGBA1C\", \"POCGLU\"    No radiology results for the last day    I have personally reviewed all medications:  Scheduled Medications  arformoterol, 15 mcg, Nebulization, BID - RT   And  budesonide, 0.5 mg, Nebulization, BID - RT   And  revefenacin, 175 mcg, Nebulization, Daily - RT  cetirizine, 10 mg, Oral, Daily  diphenhydrAMINE-zinc acetate, 1 Application, Topical, TID  ferrous sulfate, 325 mg, Oral, Daily With Breakfast  FLUoxetine, 20 mg, Oral, Daily  gabapentin, 300 mg, Oral, TID  guaiFENesin, 1,200 mg, Oral, BID  ipratropium-albuterol, 3 mL, Nebulization, 4x Daily - RT  multivitamin with minerals, 1 tablet, Oral, Daily  pantoprazole, 40 mg, Intravenous, BID AC  potassium chloride ER, 40 mEq, Oral, Q4H  potassium chloride, 20 mEq, Oral, Daily    Infusions     Diet  Diet: Cardiac; Healthy Heart (2-3 Na+); Fluid Consistency: Thin (IDDSI 0)    I have personally " reviewed:  [x]  Laboratory   []  Microbiology   [x]  Radiology   [x]  EKG/Telemetry  []  Cardiology/Vascular   []  Pathology    [x]  Records       Assessment/Plan     Active Hospital Problems    Diagnosis  POA    **Generalized abdominal pain [R10.84]  Yes    Constipation [K59.00]  Yes    Chronic respiratory failure with hypoxia [J96.11]  Yes    Spinal stenosis, lumbar region with neurogenic claudication [M48.062]  Yes    HTN (hypertension) [I10]  Yes    Neuropathy [G62.9]  Yes    Chronic diastolic CHF (congestive heart failure) [I50.32]  Yes    COPD (chronic obstructive pulmonary disease) [J44.9]  Yes    Diverticul disease small and large intestine, no perforati or abscess [K57.50]  Yes    Gastroesophageal reflux disease [K21.9]  Yes      Resolved Hospital Problems   No resolved problems to display.   Generalized Abdominal Pain  - no structural issues on CT abdomen/pelvis, suspected due to constipation-symptoms have significantly improved with resolution of this  - continue bowel regimen  - appreciate GI recommendations    HTN/Chronic Diastolic CHF  - BP acceptable, euvolemic  - finished timed course of gentle IV fluids  - monitor volume status  - hold antihypertensives and diuretic for now    COPD  - no exacerbation  - continue current regimen    GERD  - continue ppi    Rash  - looks like contact vs heat rash  - will start on cetirizine and benadryl cream    SCDs for DVT prophylaxis.  Full code.  Discussed with patient, nursing staff, and care team on multidisciplinary rounds.  Anticipate discharge to SNU facility in 1-2 days.  Expected Discharge Date: 6/4/2025; Expected Discharge Time:       Juan Howell MD  [facility]  06/03/25  12:27 EDT    Portions of this text have been copied and I have reviewed them. They are accurate as of 6/3/2025

## 2025-06-03 NOTE — PLAN OF CARE
Problem: Adult Inpatient Plan of Care  Goal: Plan of Care Review  Flowsheets (Taken 6/3/2025 0313)  Plan of Care Reviewed With: patient     Problem: Adult Inpatient Plan of Care  Goal: Absence of Hospital-Acquired Illness or Injury  Intervention: Identify and Manage Fall Risk  Recent Flowsheet Documentation  Taken 6/3/2025 0239 by Mark Desai RN  Safety Promotion/Fall Prevention:   activity supervised   clutter free environment maintained   nonskid shoes/slippers when out of bed   safety round/check completed  Taken 6/3/2025 0039 by Mark Desai RN  Safety Promotion/Fall Prevention:   activity supervised   clutter free environment maintained   nonskid shoes/slippers when out of bed   safety round/check completed  Taken 6/2/2025 2037 by Mark Desai RN  Safety Promotion/Fall Prevention:   activity supervised   clutter free environment maintained   fall prevention program maintained   nonskid shoes/slippers when out of bed   safety round/check completed   room organization consistent  Taken 6/2/2025 2005 by Mark Desai RN  Safety Promotion/Fall Prevention:   activity supervised   clutter free environment maintained   nonskid shoes/slippers when out of bed   safety round/check completed  Intervention: Prevent Skin Injury  Recent Flowsheet Documentation  Taken 6/3/2025 0239 by Mark Desai RN  Body Position:   position changed independently   side-lying   right  Taken 6/3/2025 0039 by Mark Desai RN  Body Position:   position changed independently   side-lying   right  Skin Protection:   incontinence pads utilized   transparent dressing maintained  Taken 6/2/2025 2037 by Mark Desai RN  Body Position:   position changed independently   supine  Skin Protection:   incontinence pads utilized   transparent dressing maintained  Taken 6/2/2025 2005 by Mark Desai RN  Body Position:   position changed independently   supine  Intervention: Prevent and Manage VTE (Venous  Thromboembolism) Risk  Recent Flowsheet Documentation  Taken 6/3/2025 0039 by Mark Desai RN  VTE Prevention/Management:   SCDs (sequential compression devices) off   patient refused intervention  Taken 6/2/2025 2037 by Mark Desai RN  VTE Prevention/Management:   SCDs (sequential compression devices) off   patient refused intervention  Intervention: Prevent Infection  Recent Flowsheet Documentation  Taken 6/3/2025 0239 by Mark Desai RN  Infection Prevention:   rest/sleep promoted   single patient room provided   environmental surveillance performed  Taken 6/3/2025 0039 by Mark Desai RN  Infection Prevention:   single patient room provided   rest/sleep promoted   hand hygiene promoted  Taken 6/2/2025 2037 by Mark Desai RN  Infection Prevention:   hand hygiene promoted   rest/sleep promoted   single patient room provided   environmental surveillance performed  Taken 6/2/2025 2005 by Mark Desai RN  Infection Prevention:   hand hygiene promoted   rest/sleep promoted   single patient room provided     Problem: Adult Inpatient Plan of Care  Goal: Absence of Hospital-Acquired Illness or Injury  Intervention: Identify and Manage Fall Risk  Recent Flowsheet Documentation  Taken 6/3/2025 0239 by Mark Desai RN  Safety Promotion/Fall Prevention:   activity supervised   clutter free environment maintained   nonskid shoes/slippers when out of bed   safety round/check completed  Taken 6/3/2025 0039 by Mark Desai RN  Safety Promotion/Fall Prevention:   activity supervised   clutter free environment maintained   nonskid shoes/slippers when out of bed   safety round/check completed  Taken 6/2/2025 2037 by Mark Desai RN  Safety Promotion/Fall Prevention:   activity supervised   clutter free environment maintained   fall prevention program maintained   nonskid shoes/slippers when out of bed   safety round/check completed   room organization consistent  Taken 6/2/2025 2005 by  Giselle, Birash, RN  Safety Promotion/Fall Prevention:   activity supervised   clutter free environment maintained   nonskid shoes/slippers when out of bed   safety round/check completed     Problem: Adult Inpatient Plan of Care  Goal: Absence of Hospital-Acquired Illness or Injury  Intervention: Prevent Skin Injury  Recent Flowsheet Documentation  Taken 6/3/2025 0239 by Mark Desai RN  Body Position:   position changed independently   side-lying   right  Taken 6/3/2025 0039 by Mark Desai RN  Body Position:   position changed independently   side-lying   right  Skin Protection:   incontinence pads utilized   transparent dressing maintained  Taken 6/2/2025 2037 by Mark Desai RN  Body Position:   position changed independently   supine  Skin Protection:   incontinence pads utilized   transparent dressing maintained  Taken 6/2/2025 2005 by Mark Desai RN  Body Position:   position changed independently   supine     Problem: Adult Inpatient Plan of Care  Goal: Absence of Hospital-Acquired Illness or Injury  Intervention: Prevent and Manage VTE (Venous Thromboembolism) Risk  Recent Flowsheet Documentation  Taken 6/3/2025 0039 by Mark Desai RN  VTE Prevention/Management:   SCDs (sequential compression devices) off   patient refused intervention  Taken 6/2/2025 2037 by Mark Desai RN  VTE Prevention/Management:   SCDs (sequential compression devices) off   patient refused intervention     Problem: Fall Injury Risk  Goal: Absence of Fall and Fall-Related Injury  Intervention: Promote Injury-Free Environment  Recent Flowsheet Documentation  Taken 6/3/2025 0239 by Mark Desai RN  Safety Promotion/Fall Prevention:   activity supervised   clutter free environment maintained   nonskid shoes/slippers when out of bed   safety round/check completed  Taken 6/3/2025 0039 by Mark Desai RN  Safety Promotion/Fall Prevention:   activity supervised   clutter free environment  maintained   nonskid shoes/slippers when out of bed   safety round/check completed  Taken 6/2/2025 2037 by Mark Desai RN  Safety Promotion/Fall Prevention:   activity supervised   clutter free environment maintained   fall prevention program maintained   nonskid shoes/slippers when out of bed   safety round/check completed   room organization consistent  Taken 6/2/2025 2005 by Mark Desai RN  Safety Promotion/Fall Prevention:   activity supervised   clutter free environment maintained   nonskid shoes/slippers when out of bed   safety round/check completed     Problem: Pain Acute  Goal: Optimal Pain Control and Function  Intervention: Optimize Psychosocial Wellbeing  Recent Flowsheet Documentation  Taken 6/3/2025 0039 by Mark Desai RN  Diversional Activities: television  Taken 6/2/2025 2037 by Mark Desai RN  Supportive Measures: active listening utilized  Diversional Activities: television  Intervention: Prevent or Manage Pain  Recent Flowsheet Documentation  Taken 6/3/2025 0239 by Mark Desai RN  Medication Review/Management: medications reviewed  Taken 6/3/2025 0039 by Mark Desai RN  Medication Review/Management: medications reviewed  Taken 6/2/2025 2037 by Mark Desai RN  Sensory Stimulation Regulation: television on  Medication Review/Management: medications reviewed  Taken 6/2/2025 2005 by Mark Desai RN  Medication Review/Management: medications reviewed     Problem: Pain Acute  Goal: Optimal Pain Control and Function  Intervention: Prevent or Manage Pain  Recent Flowsheet Documentation  Taken 6/3/2025 0239 by Mark Desai RN  Medication Review/Management: medications reviewed  Taken 6/3/2025 0039 by Mark Desai RN  Medication Review/Management: medications reviewed  Taken 6/2/2025 2037 by Mark Desai RN  Sensory Stimulation Regulation: television on  Medication Review/Management: medications reviewed  Taken 6/2/2025 2005 by Mark Desai  RN  Medication Review/Management: medications reviewed   Goal Outcome Evaluation:  Plan of Care Reviewed With: patient        Progress: improving  Pt is A/O X4, O2 2L NC, assist X1,Prn pain meds, scheduled medications, poor appetite ( unable to ate dinner).No c/o of abd pain and N/V over night. No acute changes over night.

## 2025-06-04 VITALS
HEART RATE: 99 BPM | RESPIRATION RATE: 18 BRPM | WEIGHT: 174.6 LBS | OXYGEN SATURATION: 96 % | BODY MASS INDEX: 24.35 KG/M2 | TEMPERATURE: 98.4 F | DIASTOLIC BLOOD PRESSURE: 60 MMHG | SYSTOLIC BLOOD PRESSURE: 100 MMHG

## 2025-06-04 LAB
ANION GAP SERPL CALCULATED.3IONS-SCNC: 8.9 MMOL/L (ref 5–15)
BASOPHILS # BLD AUTO: 0.01 10*3/MM3 (ref 0–0.2)
BASOPHILS NFR BLD AUTO: 0.2 % (ref 0–1.5)
BUN SERPL-MCNC: 8 MG/DL (ref 8–23)
BUN/CREAT SERPL: 10.7 (ref 7–25)
CALCIUM SPEC-SCNC: 8.9 MG/DL (ref 8.6–10.5)
CHLORIDE SERPL-SCNC: 106 MMOL/L (ref 98–107)
CO2 SERPL-SCNC: 22.1 MMOL/L (ref 22–29)
CREAT SERPL-MCNC: 0.75 MG/DL (ref 0.76–1.27)
DEPRECATED RDW RBC AUTO: 41.3 FL (ref 37–54)
EGFRCR SERPLBLD CKD-EPI 2021: 87.3 ML/MIN/1.73
EOSINOPHIL # BLD AUTO: 0 10*3/MM3 (ref 0–0.4)
EOSINOPHIL NFR BLD AUTO: 0 % (ref 0.3–6.2)
ERYTHROCYTE [DISTWIDTH] IN BLOOD BY AUTOMATED COUNT: 12.3 % (ref 12.3–15.4)
GLUCOSE SERPL-MCNC: 90 MG/DL (ref 65–99)
HCT VFR BLD AUTO: 35.3 % (ref 37.5–51)
HGB BLD-MCNC: 12.2 G/DL (ref 13–17.7)
IMM GRANULOCYTES # BLD AUTO: 0.01 10*3/MM3 (ref 0–0.05)
IMM GRANULOCYTES NFR BLD AUTO: 0.2 % (ref 0–0.5)
LYMPHOCYTES # BLD AUTO: 1.45 10*3/MM3 (ref 0.7–3.1)
LYMPHOCYTES NFR BLD AUTO: 34.9 % (ref 19.6–45.3)
MCH RBC QN AUTO: 31.8 PG (ref 26.6–33)
MCHC RBC AUTO-ENTMCNC: 34.6 G/DL (ref 31.5–35.7)
MCV RBC AUTO: 91.9 FL (ref 79–97)
MONOCYTES # BLD AUTO: 0.45 10*3/MM3 (ref 0.1–0.9)
MONOCYTES NFR BLD AUTO: 10.8 % (ref 5–12)
NEUTROPHILS NFR BLD AUTO: 2.23 10*3/MM3 (ref 1.7–7)
NEUTROPHILS NFR BLD AUTO: 53.9 % (ref 42.7–76)
NRBC BLD AUTO-RTO: 0 /100 WBC (ref 0–0.2)
PLATELET # BLD AUTO: 269 10*3/MM3 (ref 140–450)
PMV BLD AUTO: 9.5 FL (ref 6–12)
POTASSIUM SERPL-SCNC: 4.5 MMOL/L (ref 3.5–5.2)
RBC # BLD AUTO: 3.84 10*6/MM3 (ref 4.14–5.8)
SODIUM SERPL-SCNC: 137 MMOL/L (ref 136–145)
WBC NRBC COR # BLD AUTO: 4.15 10*3/MM3 (ref 3.4–10.8)

## 2025-06-04 PROCEDURE — 63710000001 DIPHENHYDRAMINE PER 50 MG: Performed by: INTERNAL MEDICINE

## 2025-06-04 PROCEDURE — 94799 UNLISTED PULMONARY SVC/PX: CPT

## 2025-06-04 PROCEDURE — 63710000001 REVEFENACIN 175 MCG/3ML SOLUTION: Performed by: INTERNAL MEDICINE

## 2025-06-04 PROCEDURE — 94664 DEMO&/EVAL PT USE INHALER: CPT

## 2025-06-04 PROCEDURE — 80048 BASIC METABOLIC PNL TOTAL CA: CPT | Performed by: INTERNAL MEDICINE

## 2025-06-04 PROCEDURE — 85025 COMPLETE CBC W/AUTO DIFF WBC: CPT | Performed by: INTERNAL MEDICINE

## 2025-06-04 RX ORDER — GABAPENTIN 300 MG/1
300 CAPSULE ORAL 3 TIMES DAILY
Qty: 9 CAPSULE | Refills: 0 | Status: SHIPPED | OUTPATIENT
Start: 2025-06-04

## 2025-06-04 RX ORDER — DIPHENHYDRAMINE HYDROCHLORIDE, ZINC ACETATE 2; .1 G/100G; G/100G
1 CREAM TOPICAL 3 TIMES DAILY
Qty: 8 G | Refills: 0 | Status: SHIPPED | OUTPATIENT
Start: 2025-06-04 | End: 2025-06-07

## 2025-06-04 RX ORDER — DIPHENHYDRAMINE HCL 25 MG
25 CAPSULE ORAL EVERY 6 HOURS PRN
Status: DISCONTINUED | OUTPATIENT
Start: 2025-06-04 | End: 2025-06-04 | Stop reason: HOSPADM

## 2025-06-04 RX ORDER — CETIRIZINE HYDROCHLORIDE 10 MG/1
10 TABLET ORAL DAILY
Qty: 30 TABLET | Refills: 0 | Status: SHIPPED | OUTPATIENT
Start: 2025-06-05

## 2025-06-04 RX ORDER — AMOXICILLIN 250 MG
1 CAPSULE ORAL DAILY
Qty: 120 TABLET | Refills: 0 | Status: SHIPPED | OUTPATIENT
Start: 2025-06-04

## 2025-06-04 RX ORDER — DIPHENHYDRAMINE HCL 25 MG
25 CAPSULE ORAL EVERY 6 HOURS PRN
Qty: 30 CAPSULE | Refills: 0 | Status: SHIPPED | OUTPATIENT
Start: 2025-06-04

## 2025-06-04 RX ORDER — TRIAMCINOLONE ACETONIDE 5 MG/G
1 OINTMENT TOPICAL 2 TIMES DAILY
Qty: 15 G | Refills: 0 | Status: SHIPPED | OUTPATIENT
Start: 2025-06-04

## 2025-06-04 RX ADMIN — POTASSIUM CHLORIDE 20 MEQ: 1.5 POWDER, FOR SOLUTION ORAL at 08:35

## 2025-06-04 RX ADMIN — ARFORMOTEROL TARTRATE 15 MCG: 15 SOLUTION RESPIRATORY (INHALATION) at 08:00

## 2025-06-04 RX ADMIN — IPRATROPIUM BROMIDE AND ALBUTEROL SULFATE 3 ML: .5; 3 SOLUTION RESPIRATORY (INHALATION) at 14:55

## 2025-06-04 RX ADMIN — FERROUS SULFATE TAB 325 MG (65 MG ELEMENTAL FE) 325 MG: 325 (65 FE) TAB at 08:35

## 2025-06-04 RX ADMIN — BUDESONIDE 0.5 MG: 0.5 INHALANT RESPIRATORY (INHALATION) at 07:56

## 2025-06-04 RX ADMIN — DIPHENHYDRAMINE HYDROCHLORIDE 25 MG: 25 CAPSULE ORAL at 14:33

## 2025-06-04 RX ADMIN — CETIRIZINE HYDROCHLORIDE 10 MG: 10 TABLET, FILM COATED ORAL at 08:35

## 2025-06-04 RX ADMIN — Medication 1 TABLET: at 08:35

## 2025-06-04 RX ADMIN — FLUOXETINE 20 MG: 20 CAPSULE ORAL at 08:35

## 2025-06-04 RX ADMIN — PANTOPRAZOLE SODIUM 40 MG: 40 INJECTION, POWDER, FOR SOLUTION INTRAVENOUS at 06:51

## 2025-06-04 RX ADMIN — DIPHENHYDRAMINE HYDROCHLORIDE, ZINC ACETATE 1 APPLICATION: 2; .1 CREAM TOPICAL at 14:33

## 2025-06-04 RX ADMIN — GUAIFENESIN 1200 MG: 600 TABLET, EXTENDED RELEASE ORAL at 08:35

## 2025-06-04 RX ADMIN — REVEFENACIN 175 MCG: 175 SOLUTION RESPIRATORY (INHALATION) at 08:04

## 2025-06-04 RX ADMIN — IPRATROPIUM BROMIDE AND ALBUTEROL SULFATE 3 ML: .5; 3 SOLUTION RESPIRATORY (INHALATION) at 07:53

## 2025-06-04 RX ADMIN — GABAPENTIN 300 MG: 300 CAPSULE ORAL at 08:35

## 2025-06-04 RX ADMIN — IPRATROPIUM BROMIDE AND ALBUTEROL SULFATE 3 ML: .5; 3 SOLUTION RESPIRATORY (INHALATION) at 11:05

## 2025-06-04 NOTE — CASE MANAGEMENT/SOCIAL WORK
Continued Stay Note  [facility]     Patient Name: Tony Leonard  MRN: [id number]  Today's Date: 6/4/2025    Admit Date: 6/1/2025    Plan: [facility] via [facility]   Discharge Plan       Row Name 06/04/25 1134       Plan    Plan [facility]    Patient/Family in Agreement with Plan yes    Plan Comments DC orders noted. [facility] notified and stated [facility] can transport pt at 3:30pm. MD and RN updated. Packet given to RN. Karie TAVARES/CCP    Final Discharge Disposition Code 04 - intermediate care facility    Final Note [facility] via [facility]. No additional CCP needs.                   Discharge Codes    No documentation.                 Expected Discharge Date and Time       Expected Discharge Date Expected Discharge Time    Jun 4, 2025               Maria T Hill RN

## 2025-06-04 NOTE — PLAN OF CARE
Goal Outcome Evaluation:  Plan of Care Reviewed With: patient        Progress: improving  Outcome Evaluation: Pt is A/Ox4, anti-itch cream provided, medications as scheduled, VSS, on 2L oxygen via NC. No acute changes over night. Continue plan of care.

## 2025-06-04 NOTE — DISCHARGE PLACEMENT REQUEST
"Tony Casey (85 y.o. Male)       Date of Birth   1940    Social Security Number       Address   [address] [facility] [address] [facility] [address] [place] [address]    Home Phone   [phone]    MRN   [id number]       Lutheran   Alev[employer]    Marital Status   Single                            Admission Date   6/1/2025    Admission Type   Emergency    Admitting Provider   Elizabet Parra MD    Attending Provider   Juan Howell MD    Department, Room/Bed   [address], S412/1       Discharge Date       Discharge Disposition   Skilled Nursing Facility (DC - External)    Discharge Destination                                 Attending Provider: Juan Howell MD    Allergies: Daliresp [Roflumilast], Latex, Sulfa Antibiotics    Isolation: None   Infection: None   Code Status: CPR    Ht: 180.3 cm (71\")   Wt: 79.2 kg (174 lb 9.7 oz)    Admission Cmt: None   Principal Problem: Generalized abdominal pain [R10.84]                   Active Insurance as of 6/1/2025       Primary Coverage       Payor Plan Insurance Group Employer/Plan Group    MEDICARE MEDICARE A & B        Payor Plan Address Payor Plan Phone Number Payor Plan Fax Number Effective Dates    [address] [phone]  5/1/2003 - None Entered    [facility] [id number]         Subscriber Name Subscriber Birth Date Member ID       TONY CASEY 1940 [id number]               Secondary Coverage       Payor Plan Insurance Group Employer/Plan Group     FOR LIFE  FOR LIFE  SUP         Payor Plan Address Payor Plan Phone Number Payor Plan Fax Number Effective Dates    [address] [phone]  3/10/2016 - None Entered    [facility] [address]         Subscriber Name Subscriber Birth Date Member ID       TONY CASEY 1940 [id number]               Tertiary Coverage       Payor Plan Insurance Group Employer/Plan Group    [place]        Payor Plan Address Payor Plan " Phone Number Payor Plan Fax Number Effective Dates    [address] [phone]  2/1/2025 - None Entered    [place] [address]         Subscriber Name Subscriber Birth Date Member ID       RAMIRO CASEY 1940 [id number]                     Emergency Contacts        (Rel.) Home Phone Work Phone Mobile Phone    VICKY CASEY (Son) [phone] -- [phone]    Frederick Casey (Son) -- -- [phone]    Ricardo Casey (Son) [phone] -- [phone]                 Discharge Summary        Juan Howell MD at 06/04/25 1017          Date of Admission: 6/1/2025  Date of Discharge:  6/4/2025  Primary Care Physician: Alfonso Goldstein MD     Discharge Diagnosis:  Active Hospital Problems    Diagnosis  POA    **Generalized abdominal pain [R10.84]  Yes    Constipation [K59.00]  Yes    Chronic respiratory failure with hypoxia [J96.11]  Yes    Spinal stenosis, lumbar region with neurogenic claudication [M48.062]  Yes    HTN (hypertension) [I10]  Yes    Neuropathy [G62.9]  Yes    Chronic diastolic CHF (congestive heart failure) [I50.32]  Yes    COPD (chronic obstructive pulmonary disease) [J44.9]  Yes    Diverticul disease small and large intestine, no perforati or abscess [K57.50]  Yes    Gastroesophageal reflux disease [K21.9]  Yes      Resolved Hospital Problems   No resolved problems to display.       Presenting Problem/History of Present Illness:  Generalized abdominal pain [R10.84]  Chronic respiratory failure with hypoxia [J96.11]  Hypotension due to hypovolemia [E86.1]     Hospital Course:  The patient is a 85 y.o. male with the above past medical history who presented with abdominal pain. He had no acute issues on ct abdomen/pelvis except for constipation. This was treated with relief of his symptoms. He will be discharged on a bowel regimen. Of note he does have a rash on his BLE and lower back which looks reactive. He does have known allergies which cause a rash. He will be  discharged on topical steroids as well as topical and systemic antihistamines. He is medically stable and will be discharged back to SNF.    Exam Today:  Blood pressure 111/62, pulse 89, temperature 97.9 °F (36.6 °C), temperature source Oral, resp. rate 18, weight 79.2 kg (174 lb 9.7 oz), SpO2 96%.  Vitals and nursing note reviewed.   Constitutional:       General: He is not in acute distress.     Appearance: He is ill-appearing (chronically). He is not toxic-appearing or diaphoretic.   HENT:      Head: Normocephalic and atraumatic.      Nose: Nose normal.      Mouth/Throat:      Mouth: Mucous membranes are moist.      Pharynx: Oropharynx is clear.   Eyes:      Conjunctiva/sclera: Conjunctivae normal.      Pupils: Pupils are equal, round, and reactive to light.   Cardiovascular:      Rate and Rhythm: Normal rate and regular rhythm.      Pulses: Normal pulses.   Pulmonary:      Effort: Pulmonary effort is normal.      Breath sounds: Normal breath sounds.   Abdominal:      General: Bowel sounds are normal. There is no distension.      Palpations: Abdomen is soft.      Tenderness: There is no abdominal tenderness.   Musculoskeletal:         General: No swelling or tenderness.      Cervical back: Neck supple.   Skin:     General: Skin is warm and dry.      Capillary Refill: Capillary refill takes less than 2 seconds.      Findings: Rash (raised maculopapular rash on BLE and lower back) present.   Neurological:      Mental Status: He is alert.   Psychiatric:         Mood and Affect: Mood normal.         Behavior: Behavior normal.     Consults:   Consults       Date and Time Order Name Status Description    6/1/2025 11:19 PM Inpatient Gastroenterology Consult               Discharge Disposition:  Skilled Nursing Facility (DC - External)    Discharge Medications:     Discharge Medications        New Medications        Instructions Start Date   cetirizine 10 MG tablet  Commonly known as: zyrTEC   10 mg, Oral, Daily   Start  Date: June 5, 2025     diphenhydrAMINE 25 mg capsule  Commonly known as: BENADRYL   25 mg, Oral, Every 6 Hours PRN      diphenhydrAMINE-zinc acetate 2-0.1 % cream   1 Application, Topical, 3 times daily      sennosides-docusate 8.6-50 MG per tablet  Commonly known as: PERICOLACE   1 tablet, Oral, Daily      triamcinolone 0.5 % ointment  Commonly known as: KENALOG   1 Application, Topical, 2 Times Daily, Apply to rash on BLE and lower back             Changes to Medications        Instructions Start Date   ondansetron ODT 4 MG disintegrating tablet  Commonly known as: ZOFRAN-ODT   4 mg, Translingual, Every 6 Hours PRN             Continue These Medications        Instructions Start Date   acetaminophen 325 MG tablet  Commonly known as: TYLENOL   650 mg, Oral, Every 4 Hours PRN      albuterol (2.5 MG/3ML) 0.083% nebulizer solution  Commonly known as: PROVENTIL   2.5 mg, Nebulization, Every 6 Hours PRN      Aplisol 5 UNIT/0.1ML injection  Generic drug: tuberculin   5 Units, Once      dimethicone 1.3 % lotion lotion  Commonly known as: AVEENO   1 Application, Every 12 Hours      ferrous sulfate 325 (65 FE) MG tablet   325 mg, Daily With Breakfast      FLUoxetine 20 MG capsule  Commonly known as: PROzac   TAKE 1 CAPSULE DAILY      furosemide 20 MG tablet  Commonly known as: LASIX   20 mg, 2 Times Daily      gabapentin 300 MG capsule  Commonly known as: NEURONTIN   300 mg, Oral, 3 Times Daily      guaiFENesin 600 MG 12 hr tablet  Commonly known as: MUCINEX   1,200 mg, 2 Times Daily      ipratropium-albuterol 0.5-2.5 mg/3 ml nebulizer  Commonly known as: DUO-NEB   3 mL, 4 Times Daily - RT      Melatonin 3 MG capsule   3 mg, Every Night at Bedtime      menthol-zinc oxide 0.44-20.625 % ointment ointment  Commonly known as: CALMOSEPTINE   1 Application, Every 12 Hours Scheduled      Multivitamin Adult tablet tablet  Generic drug: multivitamin with minerals   1 tablet, Oral, Daily      pantoprazole 40 MG EC tablet  Commonly  known as: PROTONIX   40 mg, Oral, Daily      potassium chloride 20 MEQ packet  Commonly known as: KLOR-CON   20 mEq, Daily      sodium chloride 7 % nebulizer solution nebulizer solution   4 mL, Nebulization, Every 4 Hours PRN      sucralfate 1 g tablet  Commonly known as: Carafate   1 g, Oral, 4 Times Daily      ThermaCare Back Pain Therapy misc   1 Pad, Daily      Trelegy Ellipta 100-62.5-25 MCG/ACT inhaler  Generic drug: Fluticasone-Umeclidin-Vilant   1 puff, Inhalation, Daily - RT               Discharge Diet:   Diet Instructions       Diet: Cardiac Diets; Healthy Heart (2-3 Na+); Regular (IDDSI 7); Thin (IDDSI 0)      Discharge Diet: Cardiac Diets    Cardiac Diet: Healthy Heart (2-3 Na+)    Texture: Regular (IDDSI 7)    Fluid Consistency: Thin (IDDSI 0)            Activity at Discharge:   Activity Instructions       Activity as Tolerated              Follow-up Appointments:  No future appointments.  Additional Instructions for the Follow-ups that You Need to Schedule       Discharge Follow-up with Specialty: general practitioner or PCP at SNF   As directed      Specialty: general practitioner or PCP at SNF   Follow Up Details: 1-3d                 Juan Howell MD  06/04/25  10:21 EDT    Time Spent on Discharge Activities: Greater than 30 minutes.          Electronically signed by Juan Howell MD at 06/04/25 2015

## 2025-06-04 NOTE — CASE MANAGEMENT/SOCIAL WORK
Case Management Discharge Note      Final Note: [facility] via [facility]. No additional CCP needs.         Selected Continued Care - Admitted Since 6/1/2025       Destination Coordination complete.      Service Provider Services Address Phone Fax Patient Preferred    [facility] Nursing [address] [address] [phone] --              Durable Medical Equipment    No services have been selected for the patient.                Dialysis/Infusion    No services have been selected for the patient.                Home Medical Care    No services have been selected for the patient.                Therapy    No services have been selected for the patient.                Community Resources    No services have been selected for the patient.                Community & DME    No services have been selected for the patient.                    Selected Continued Care - Prior Encounters Includes continued care and service providers with selected services from prior encounters from 3/3/2025 to 6/4/2025      Discharged on 3/21/2025 Admission date: 3/18/2025 - Discharge disposition: Skilled Nursing Facility (DC - External)      Destination       Service Provider Services Address Phone Fax Patient Preferred    [facility] Skilled Nursing [address] [place] [address] [phone] --                      Discharged on 3/4/2025 Admission date: 2/26/2025 - Discharge disposition: Intermediate Care       Destination       Service Provider Services Address Phone Fax Patient Preferred    [facility] [address] [address] [phone] --                          Transportation Services  W/C Van: Skilled Nursing Facility Van    Final Discharge Disposition Code: 04 - intermediate care facility

## 2025-06-04 NOTE — DISCHARGE SUMMARY
Date of Admission: 6/1/2025  Date of Discharge:  6/4/2025  Primary Care Physician: Alfonso Goldstein MD     Discharge Diagnosis:  Active Hospital Problems    Diagnosis  POA    **Generalized abdominal pain [R10.84]  Yes    Constipation [K59.00]  Yes    Chronic respiratory failure with hypoxia [J96.11]  Yes    Spinal stenosis, lumbar region with neurogenic claudication [M48.062]  Yes    HTN (hypertension) [I10]  Yes    Neuropathy [G62.9]  Yes    Chronic diastolic CHF (congestive heart failure) [I50.32]  Yes    COPD (chronic obstructive pulmonary disease) [J44.9]  Yes    Diverticul disease small and large intestine, no perforati or abscess [K57.50]  Yes    Gastroesophageal reflux disease [K21.9]  Yes      Resolved Hospital Problems   No resolved problems to display.       Presenting Problem/History of Present Illness:  Generalized abdominal pain [R10.84]  Chronic respiratory failure with hypoxia [J96.11]  Hypotension due to hypovolemia [E86.1]     Hospital Course:  The patient is a 85 y.o. male with the above past medical history who presented with abdominal pain. He had no acute issues on ct abdomen/pelvis except for constipation. This was treated with relief of his symptoms. He will be discharged on a bowel regimen. Of note he does have a rash on his BLE and lower back which looks reactive. He does have known allergies which cause a rash. He will be discharged on topical steroids as well as topical and systemic antihistamines. He is medically stable and will be discharged back to SNF.    Exam Today:  Blood pressure 111/62, pulse 89, temperature 97.9 °F (36.6 °C), temperature source Oral, resp. rate 18, weight 79.2 kg (174 lb 9.7 oz), SpO2 96%.  Vitals and nursing note reviewed.   Constitutional:       General: He is not in acute distress.     Appearance: He is ill-appearing (chronically). He is not toxic-appearing or diaphoretic.   HENT:      Head: Normocephalic and atraumatic.      Nose: Nose normal.       Mouth/Throat:      Mouth: Mucous membranes are moist.      Pharynx: Oropharynx is clear.   Eyes:      Conjunctiva/sclera: Conjunctivae normal.      Pupils: Pupils are equal, round, and reactive to light.   Cardiovascular:      Rate and Rhythm: Normal rate and regular rhythm.      Pulses: Normal pulses.   Pulmonary:      Effort: Pulmonary effort is normal.      Breath sounds: Normal breath sounds.   Abdominal:      General: Bowel sounds are normal. There is no distension.      Palpations: Abdomen is soft.      Tenderness: There is no abdominal tenderness.   Musculoskeletal:         General: No swelling or tenderness.      Cervical back: Neck supple.   Skin:     General: Skin is warm and dry.      Capillary Refill: Capillary refill takes less than 2 seconds.      Findings: Rash (raised maculopapular rash on BLE and lower back) present.   Neurological:      Mental Status: He is alert.   Psychiatric:         Mood and Affect: Mood normal.         Behavior: Behavior normal.     Consults:   Consults       Date and Time Order Name Status Description    6/1/2025 11:19 PM Inpatient Gastroenterology Consult               Discharge Disposition:  Skilled Nursing Facility ([place] - External)    Discharge Medications:     Discharge Medications        New Medications        Instructions Start Date   cetirizine 10 MG tablet  Commonly known as: zyrTEC   10 mg, Oral, Daily   Start Date: June 5, 2025     diphenhydrAMINE 25 mg capsule  Commonly known as: BENADRYL   25 mg, Oral, Every 6 Hours PRN      diphenhydrAMINE-zinc acetate 2-0.1 % cream   1 Application, Topical, 3 times daily      sennosides-docusate 8.6-50 MG per tablet  Commonly known as: PERICOLACE   1 tablet, Oral, Daily      triamcinolone 0.5 % ointment  Commonly known as: KENALOG   1 Application, Topical, 2 Times Daily, Apply to rash on BLE and lower back             Changes to Medications        Instructions Start Date   ondansetron ODT 4 MG disintegrating tablet  Commonly  known as: ZOFRAN-ODT   4 mg, Translingual, Every 6 Hours PRN             Continue These Medications        Instructions Start Date   acetaminophen 325 MG tablet  Commonly known as: TYLENOL   650 mg, Oral, Every 4 Hours PRN      albuterol (2.5 MG/3ML) 0.083% nebulizer solution  Commonly known as: PROVENTIL   2.5 mg, Nebulization, Every 6 Hours PRN      Aplisol 5 UNIT/0.1ML injection  Generic drug: tuberculin   5 Units, Once      dimethicone 1.3 % lotion lotion  Commonly known as: AVEENO   1 Application, Every 12 Hours      ferrous sulfate 325 (65 FE) MG tablet   325 mg, Daily With Breakfast      FLUoxetine 20 MG capsule  Commonly known as: PROzac   TAKE 1 CAPSULE DAILY      furosemide 20 MG tablet  Commonly known as: LASIX   20 mg, 2 Times Daily      gabapentin 300 MG capsule  Commonly known as: NEURONTIN   300 mg, Oral, 3 Times Daily      guaiFENesin 600 MG 12 hr tablet  Commonly known as: MUCINEX   1,200 mg, 2 Times Daily      ipratropium-albuterol 0.5-2.5 mg/3 ml nebulizer  Commonly known as: DUO-NEB   3 mL, 4 Times Daily - RT      Melatonin 3 MG capsule   3 mg, Every Night at Bedtime      menthol-zinc oxide 0.44-20.625 % ointment ointment  Commonly known as: CALMOSEPTINE   1 Application, Every 12 Hours Scheduled      Multivitamin Adult tablet tablet  Generic drug: multivitamin with minerals   1 tablet, Oral, Daily      pantoprazole 40 MG EC tablet  Commonly known as: PROTONIX   40 mg, Oral, Daily      potassium chloride 20 MEQ packet  Commonly known as: KLOR-CON   20 mEq, Daily      sodium chloride 7 % nebulizer solution nebulizer solution   4 mL, Nebulization, Every 4 Hours PRN      sucralfate 1 g tablet  Commonly known as: Carafate   1 g, Oral, 4 Times Daily      ThermaCare Back Pain Therapy misc   1 Pad, Daily      Trelegy Ellipta 100-62.5-25 MCG/ACT inhaler  Generic drug: Fluticasone-Umeclidin-Vilant   1 puff, Inhalation, Daily - RT               Discharge Diet:   Diet Instructions       Diet: Cardiac Diets;  Healthy Heart (2-3 Na+); Regular (IDDSI 7); Thin (IDDSI 0)      Discharge Diet: Cardiac Diets    Cardiac Diet: Healthy Heart (2-3 Na+)    Texture: Regular (IDDSI 7)    Fluid Consistency: Thin (IDDSI 0)            Activity at Discharge:   Activity Instructions       Activity as Tolerated              Follow-up Appointments:  No future appointments.  Additional Instructions for the Follow-ups that You Need to Schedule       Discharge Follow-up with Specialty: general practitioner or PCP at SNF   As directed      Specialty: general practitioner or PCP at SNF   Follow Up Details: 1-3d                 Juan Howell MD  06/04/25  10:21 EDT    Time Spent on Discharge Activities: Greater than 30 minutes.

## 2025-08-02 ENCOUNTER — APPOINTMENT (OUTPATIENT)
Dept: GENERAL RADIOLOGY | Facility: HOSPITAL | Age: 85
DRG: 193 | End: 2025-08-02
Payer: MEDICARE

## 2025-08-02 ENCOUNTER — HOSPITAL ENCOUNTER (INPATIENT)
Facility: HOSPITAL | Age: 85
LOS: 9 days | Discharge: NURSING, REHAB OR LONG-TERM CARE | DRG: 193 | End: 2025-08-12
Attending: EMERGENCY MEDICINE | Admitting: INTERNAL MEDICINE
Payer: MEDICARE

## 2025-08-02 DIAGNOSIS — J96.21 ACUTE ON CHRONIC HYPOXIC RESPIRATORY FAILURE: ICD-10-CM

## 2025-08-02 DIAGNOSIS — R29.898 WEAKNESS OF RIGHT UPPER EXTREMITY: ICD-10-CM

## 2025-08-02 DIAGNOSIS — E87.6 HYPOKALEMIA: ICD-10-CM

## 2025-08-02 DIAGNOSIS — J98.11 ATELECTASIS OF LEFT LUNG: ICD-10-CM

## 2025-08-02 DIAGNOSIS — J18.9 COMMUNITY ACQUIRED PNEUMONIA OF RIGHT UPPER LOBE OF LUNG: Primary | ICD-10-CM

## 2025-08-02 DIAGNOSIS — G62.9 NEUROPATHY: ICD-10-CM

## 2025-08-02 LAB
B PARAPERT DNA SPEC QL NAA+PROBE: NOT DETECTED
B PERT DNA SPEC QL NAA+PROBE: NOT DETECTED
BASOPHILS # BLD AUTO: 0.06 10*3/MM3 (ref 0–0.2)
BASOPHILS NFR BLD AUTO: 0.7 % (ref 0–1.5)
C PNEUM DNA NPH QL NAA+NON-PROBE: NOT DETECTED
DEPRECATED RDW RBC AUTO: 49.5 FL (ref 37–54)
EOSINOPHIL # BLD AUTO: 1.4 10*3/MM3 (ref 0–0.4)
EOSINOPHIL NFR BLD AUTO: 15.2 % (ref 0.3–6.2)
ERYTHROCYTE [DISTWIDTH] IN BLOOD BY AUTOMATED COUNT: 14.5 % (ref 12.3–15.4)
FLUAV SUBTYP SPEC NAA+PROBE: NOT DETECTED
FLUBV RNA NPH QL NAA+NON-PROBE: NOT DETECTED
HADV DNA SPEC NAA+PROBE: NOT DETECTED
HCOV 229E RNA SPEC QL NAA+PROBE: NOT DETECTED
HCOV HKU1 RNA SPEC QL NAA+PROBE: NOT DETECTED
HCOV NL63 RNA SPEC QL NAA+PROBE: NOT DETECTED
HCOV OC43 RNA SPEC QL NAA+PROBE: NOT DETECTED
HCT VFR BLD AUTO: 43.7 % (ref 37.5–51)
HGB BLD-MCNC: 14.8 G/DL (ref 13–17.7)
HMPV RNA NPH QL NAA+NON-PROBE: NOT DETECTED
HPIV1 RNA ISLT QL NAA+PROBE: NOT DETECTED
HPIV2 RNA SPEC QL NAA+PROBE: NOT DETECTED
HPIV3 RNA NPH QL NAA+PROBE: NOT DETECTED
HPIV4 P GENE NPH QL NAA+PROBE: NOT DETECTED
IMM GRANULOCYTES # BLD AUTO: 0.03 10*3/MM3 (ref 0–0.05)
IMM GRANULOCYTES NFR BLD AUTO: 0.3 % (ref 0–0.5)
LYMPHOCYTES # BLD AUTO: 2.59 10*3/MM3 (ref 0.7–3.1)
LYMPHOCYTES NFR BLD AUTO: 28.1 % (ref 19.6–45.3)
M PNEUMO IGG SER IA-ACNC: NOT DETECTED
MCH RBC QN AUTO: 31.6 PG (ref 26.6–33)
MCHC RBC AUTO-ENTMCNC: 33.9 G/DL (ref 31.5–35.7)
MCV RBC AUTO: 93.4 FL (ref 79–97)
MONOCYTES # BLD AUTO: 0.8 10*3/MM3 (ref 0.1–0.9)
MONOCYTES NFR BLD AUTO: 8.7 % (ref 5–12)
NEUTROPHILS NFR BLD AUTO: 4.34 10*3/MM3 (ref 1.7–7)
NEUTROPHILS NFR BLD AUTO: 47 % (ref 42.7–76)
NRBC BLD AUTO-RTO: 0 /100 WBC (ref 0–0.2)
PLATELET # BLD AUTO: 315 10*3/MM3 (ref 140–450)
PMV BLD AUTO: 10 FL (ref 6–12)
RBC # BLD AUTO: 4.68 10*6/MM3 (ref 4.14–5.8)
RHINOVIRUS RNA SPEC NAA+PROBE: NOT DETECTED
RSV RNA NPH QL NAA+NON-PROBE: NOT DETECTED
SARS-COV-2 RNA NPH QL NAA+NON-PROBE: NOT DETECTED
WBC NRBC COR # BLD AUTO: 9.22 10*3/MM3 (ref 3.4–10.8)

## 2025-08-02 PROCEDURE — 71045 X-RAY EXAM CHEST 1 VIEW: CPT

## 2025-08-02 PROCEDURE — 93010 ELECTROCARDIOGRAM REPORT: CPT | Performed by: INTERNAL MEDICINE

## 2025-08-02 PROCEDURE — 84484 ASSAY OF TROPONIN QUANT: CPT | Performed by: EMERGENCY MEDICINE

## 2025-08-02 PROCEDURE — 85025 COMPLETE CBC W/AUTO DIFF WBC: CPT | Performed by: EMERGENCY MEDICINE

## 2025-08-02 PROCEDURE — 99285 EMERGENCY DEPT VISIT HI MDM: CPT

## 2025-08-02 PROCEDURE — 80053 COMPREHEN METABOLIC PANEL: CPT | Performed by: EMERGENCY MEDICINE

## 2025-08-02 PROCEDURE — 83605 ASSAY OF LACTIC ACID: CPT | Performed by: EMERGENCY MEDICINE

## 2025-08-02 PROCEDURE — 36415 COLL VENOUS BLD VENIPUNCTURE: CPT

## 2025-08-02 PROCEDURE — 83880 ASSAY OF NATRIURETIC PEPTIDE: CPT | Performed by: EMERGENCY MEDICINE

## 2025-08-02 PROCEDURE — 0202U NFCT DS 22 TRGT SARS-COV-2: CPT | Performed by: EMERGENCY MEDICINE

## 2025-08-02 PROCEDURE — 93005 ELECTROCARDIOGRAM TRACING: CPT | Performed by: EMERGENCY MEDICINE

## 2025-08-02 PROCEDURE — 87040 BLOOD CULTURE FOR BACTERIA: CPT | Performed by: EMERGENCY MEDICINE

## 2025-08-02 RX ORDER — SODIUM CHLORIDE 0.9 % (FLUSH) 0.9 %
10 SYRINGE (ML) INJECTION AS NEEDED
Status: DISCONTINUED | OUTPATIENT
Start: 2025-08-02 | End: 2025-08-12 | Stop reason: HOSPADM

## 2025-08-03 ENCOUNTER — APPOINTMENT (OUTPATIENT)
Dept: GENERAL RADIOLOGY | Facility: HOSPITAL | Age: 85
DRG: 193 | End: 2025-08-03
Payer: MEDICARE

## 2025-08-03 PROBLEM — J18.9 COMMUNITY ACQUIRED PNEUMONIA: Status: ACTIVE | Noted: 2025-08-03

## 2025-08-03 LAB
ALBUMIN SERPL-MCNC: 3.4 G/DL (ref 3.5–5.2)
ALBUMIN/GLOB SERPL: 0.9 G/DL
ALP SERPL-CCNC: 73 U/L (ref 39–117)
ALT SERPL W P-5'-P-CCNC: 9 U/L (ref 1–41)
ANION GAP SERPL CALCULATED.3IONS-SCNC: 11.6 MMOL/L (ref 5–15)
ANION GAP SERPL CALCULATED.3IONS-SCNC: 13 MMOL/L (ref 5–15)
ARTERIAL PATENCY WRIST A: POSITIVE
AST SERPL-CCNC: 16 U/L (ref 1–40)
ATMOSPHERIC PRESS: 750.4 MMHG
BASE EXCESS BLDA CALC-SCNC: 1.5 MMOL/L (ref 0–2)
BDY SITE: ABNORMAL
BILIRUB SERPL-MCNC: 0.6 MG/DL (ref 0–1.2)
BUN SERPL-MCNC: 13 MG/DL (ref 8–23)
BUN SERPL-MCNC: 14 MG/DL (ref 8–23)
BUN/CREAT SERPL: 16 (ref 7–25)
BUN/CREAT SERPL: 18.7 (ref 7–25)
CALCIUM SPEC-SCNC: 9 MG/DL (ref 8.6–10.5)
CALCIUM SPEC-SCNC: 9.4 MG/DL (ref 8.6–10.5)
CHLORIDE SERPL-SCNC: 100 MMOL/L (ref 98–107)
CHLORIDE SERPL-SCNC: 99 MMOL/L (ref 98–107)
CO2 SERPL-SCNC: 24 MMOL/L (ref 22–29)
CO2 SERPL-SCNC: 26.4 MMOL/L (ref 22–29)
CREAT SERPL-MCNC: 0.75 MG/DL (ref 0.76–1.27)
CREAT SERPL-MCNC: 0.81 MG/DL (ref 0.76–1.27)
D-LACTATE SERPL-SCNC: 1.3 MMOL/L (ref 0.5–2)
DEPRECATED RDW RBC AUTO: 48.6 FL (ref 37–54)
DEVICE COMMENT: ABNORMAL
EGFRCR SERPLBLD CKD-EPI 2021: 85.3 ML/MIN/1.73
EGFRCR SERPLBLD CKD-EPI 2021: 87.3 ML/MIN/1.73
ERYTHROCYTE [DISTWIDTH] IN BLOOD BY AUTOMATED COUNT: 14.2 % (ref 12.3–15.4)
GAS FLOW AIRWAY: 15 LPM
GEN 5 1HR TROPONIN T REFLEX: 28 NG/L
GLOBULIN UR ELPH-MCNC: 3.7 GM/DL
GLUCOSE BLDC GLUCOMTR-MCNC: 97 MG/DL (ref 65–99)
GLUCOSE SERPL-MCNC: 98 MG/DL (ref 65–99)
GLUCOSE SERPL-MCNC: 99 MG/DL (ref 65–99)
HCO3 BLDA-SCNC: 27.9 MMOL/L (ref 22–28)
HCT VFR BLD AUTO: 43 % (ref 37.5–51)
HEMODILUTION: NO
HGB BLD-MCNC: 14.2 G/DL (ref 13–17.7)
MCH RBC QN AUTO: 30.9 PG (ref 26.6–33)
MCHC RBC AUTO-ENTMCNC: 33 G/DL (ref 31.5–35.7)
MCV RBC AUTO: 93.7 FL (ref 79–97)
MODALITY: ABNORMAL
MRSA DNA SPEC QL NAA+PROBE: ABNORMAL
NT-PROBNP SERPL-MCNC: 172 PG/ML (ref 0–1800)
PCO2 BLDA: 49.8 MM HG (ref 35–45)
PH BLDA: 7.36 PH UNITS (ref 7.35–7.45)
PLATELET # BLD AUTO: 319 10*3/MM3 (ref 140–450)
PMV BLD AUTO: 9.4 FL (ref 6–12)
PO2 BLDA: 169.8 MM HG (ref 80–100)
POTASSIUM SERPL-SCNC: 3.2 MMOL/L (ref 3.5–5.2)
POTASSIUM SERPL-SCNC: 3.5 MMOL/L (ref 3.5–5.2)
POTASSIUM SERPL-SCNC: 5.2 MMOL/L (ref 3.5–5.2)
PROCALCITONIN SERPL-MCNC: 0.07 NG/ML (ref 0–0.25)
PROT SERPL-MCNC: 7.1 G/DL (ref 6–8.5)
RBC # BLD AUTO: 4.59 10*6/MM3 (ref 4.14–5.8)
SAO2 % BLDCOA: 99.4 % (ref 92–98.5)
SODIUM SERPL-SCNC: 136 MMOL/L (ref 136–145)
SODIUM SERPL-SCNC: 138 MMOL/L (ref 136–145)
TOTAL RATE: 28 BREATHS/MINUTE
TROPONIN T % DELTA: -10
TROPONIN T NUMERIC DELTA: -3 NG/L
TROPONIN T SERPL HS-MCNC: 31 NG/L
WBC NRBC COR # BLD AUTO: 8.87 10*3/MM3 (ref 3.4–10.8)

## 2025-08-03 PROCEDURE — 84132 ASSAY OF SERUM POTASSIUM: CPT | Performed by: INTERNAL MEDICINE

## 2025-08-03 PROCEDURE — 87205 SMEAR GRAM STAIN: CPT | Performed by: NURSE PRACTITIONER

## 2025-08-03 PROCEDURE — 25010000002 VANCOMYCIN 10 G RECONSTITUTED SOLUTION: Performed by: INTERNAL MEDICINE

## 2025-08-03 PROCEDURE — 25810000003 SODIUM CHLORIDE 0.9 % SOLUTION: Performed by: EMERGENCY MEDICINE

## 2025-08-03 PROCEDURE — 80048 BASIC METABOLIC PNL TOTAL CA: CPT | Performed by: NURSE PRACTITIONER

## 2025-08-03 PROCEDURE — 25810000003 SODIUM CHLORIDE 0.9 % SOLUTION: Performed by: INTERNAL MEDICINE

## 2025-08-03 PROCEDURE — 36600 WITHDRAWAL OF ARTERIAL BLOOD: CPT

## 2025-08-03 PROCEDURE — 87641 MR-STAPH DNA AMP PROBE: CPT | Performed by: NURSE PRACTITIONER

## 2025-08-03 PROCEDURE — 71045 X-RAY EXAM CHEST 1 VIEW: CPT

## 2025-08-03 PROCEDURE — 82948 REAGENT STRIP/BLOOD GLUCOSE: CPT

## 2025-08-03 PROCEDURE — 94799 UNLISTED PULMONARY SVC/PX: CPT

## 2025-08-03 PROCEDURE — 94761 N-INVAS EAR/PLS OXIMETRY MLT: CPT

## 2025-08-03 PROCEDURE — 84484 ASSAY OF TROPONIN QUANT: CPT | Performed by: EMERGENCY MEDICINE

## 2025-08-03 PROCEDURE — 94640 AIRWAY INHALATION TREATMENT: CPT

## 2025-08-03 PROCEDURE — 25010000002 METHYLPREDNISOLONE PER 125 MG: Performed by: NURSE PRACTITIONER

## 2025-08-03 PROCEDURE — 36415 COLL VENOUS BLD VENIPUNCTURE: CPT | Performed by: NURSE PRACTITIONER

## 2025-08-03 PROCEDURE — 82803 BLOOD GASES ANY COMBINATION: CPT

## 2025-08-03 PROCEDURE — 85027 COMPLETE CBC AUTOMATED: CPT | Performed by: NURSE PRACTITIONER

## 2025-08-03 PROCEDURE — 25010000002 CEFTRIAXONE PER 250 MG: Performed by: EMERGENCY MEDICINE

## 2025-08-03 PROCEDURE — 84145 PROCALCITONIN (PCT): CPT | Performed by: NURSE PRACTITIONER

## 2025-08-03 RX ORDER — BISACODYL 5 MG/1
5 TABLET, DELAYED RELEASE ORAL DAILY PRN
Status: DISCONTINUED | OUTPATIENT
Start: 2025-08-03 | End: 2025-08-03

## 2025-08-03 RX ORDER — GUAIFENESIN 200 MG/10ML
200 LIQUID ORAL ONCE
Status: COMPLETED | OUTPATIENT
Start: 2025-08-03 | End: 2025-08-03

## 2025-08-03 RX ORDER — SODIUM CHLORIDE 0.9 % (FLUSH) 0.9 %
10 SYRINGE (ML) INJECTION EVERY 12 HOURS SCHEDULED
Status: DISCONTINUED | OUTPATIENT
Start: 2025-08-03 | End: 2025-08-12 | Stop reason: HOSPADM

## 2025-08-03 RX ORDER — IPRATROPIUM BROMIDE AND ALBUTEROL SULFATE 2.5; .5 MG/3ML; MG/3ML
3 SOLUTION RESPIRATORY (INHALATION)
Status: DISCONTINUED | OUTPATIENT
Start: 2025-08-03 | End: 2025-08-12 | Stop reason: HOSPADM

## 2025-08-03 RX ORDER — SODIUM CHLORIDE FOR INHALATION 7 %
4 VIAL, NEBULIZER (ML) INHALATION
Status: DISCONTINUED | OUTPATIENT
Start: 2025-08-03 | End: 2025-08-11

## 2025-08-03 RX ORDER — POLYETHYLENE GLYCOL 3350 17 G/17G
17 POWDER, FOR SOLUTION ORAL DAILY PRN
Status: DISCONTINUED | OUTPATIENT
Start: 2025-08-03 | End: 2025-08-03

## 2025-08-03 RX ORDER — ONDANSETRON 2 MG/ML
4 INJECTION INTRAMUSCULAR; INTRAVENOUS EVERY 6 HOURS PRN
Status: DISCONTINUED | OUTPATIENT
Start: 2025-08-03 | End: 2025-08-12 | Stop reason: HOSPADM

## 2025-08-03 RX ORDER — POTASSIUM CHLORIDE 1500 MG/1
40 TABLET, EXTENDED RELEASE ORAL ONCE
Status: COMPLETED | OUTPATIENT
Start: 2025-08-03 | End: 2025-08-03

## 2025-08-03 RX ORDER — POTASSIUM CHLORIDE 1500 MG/1
40 TABLET, EXTENDED RELEASE ORAL EVERY 4 HOURS
Status: COMPLETED | OUTPATIENT
Start: 2025-08-03 | End: 2025-08-03

## 2025-08-03 RX ORDER — GABAPENTIN 300 MG/1
300 CAPSULE ORAL 3 TIMES DAILY
Status: DISCONTINUED | OUTPATIENT
Start: 2025-08-03 | End: 2025-08-12 | Stop reason: HOSPADM

## 2025-08-03 RX ORDER — VANCOMYCIN/0.9 % SOD CHLORIDE 1.5G/250ML
20 PLASTIC BAG, INJECTION (ML) INTRAVENOUS ONCE
Status: DISCONTINUED | OUTPATIENT
Start: 2025-08-03 | End: 2025-08-03

## 2025-08-03 RX ORDER — CALCIUM CARBONATE 500 MG/1
2 TABLET, CHEWABLE ORAL 2 TIMES DAILY PRN
Status: DISCONTINUED | OUTPATIENT
Start: 2025-08-03 | End: 2025-08-12 | Stop reason: HOSPADM

## 2025-08-03 RX ORDER — ACETAMINOPHEN 650 MG/1
650 SUPPOSITORY RECTAL EVERY 4 HOURS PRN
Status: DISCONTINUED | OUTPATIENT
Start: 2025-08-03 | End: 2025-08-12 | Stop reason: HOSPADM

## 2025-08-03 RX ORDER — ACETAMINOPHEN 160 MG/5ML
650 SOLUTION ORAL EVERY 4 HOURS PRN
Status: DISCONTINUED | OUTPATIENT
Start: 2025-08-03 | End: 2025-08-12 | Stop reason: HOSPADM

## 2025-08-03 RX ORDER — ACETAMINOPHEN 325 MG/1
650 TABLET ORAL EVERY 4 HOURS PRN
Status: DISCONTINUED | OUTPATIENT
Start: 2025-08-03 | End: 2025-08-12 | Stop reason: HOSPADM

## 2025-08-03 RX ORDER — METHYLPREDNISOLONE SODIUM SUCCINATE 125 MG/2ML
60 INJECTION, POWDER, LYOPHILIZED, FOR SOLUTION INTRAMUSCULAR; INTRAVENOUS EVERY 12 HOURS
Status: DISCONTINUED | OUTPATIENT
Start: 2025-08-03 | End: 2025-08-04

## 2025-08-03 RX ORDER — BISACODYL 10 MG
10 SUPPOSITORY, RECTAL RECTAL DAILY PRN
Status: DISCONTINUED | OUTPATIENT
Start: 2025-08-03 | End: 2025-08-03

## 2025-08-03 RX ORDER — VANCOMYCIN/0.9 % SOD CHLORIDE 1.5G/250ML
1500 PLASTIC BAG, INJECTION (ML) INTRAVENOUS EVERY 24 HOURS
Status: DISCONTINUED | OUTPATIENT
Start: 2025-08-03 | End: 2025-08-07

## 2025-08-03 RX ORDER — POLYETHYLENE GLYCOL 3350 17 G/17G
17 POWDER, FOR SOLUTION ORAL 2 TIMES DAILY
Status: DISCONTINUED | OUTPATIENT
Start: 2025-08-03 | End: 2025-08-12 | Stop reason: HOSPADM

## 2025-08-03 RX ORDER — FERROUS SULFATE 325(65) MG
325 TABLET ORAL
Status: DISCONTINUED | OUTPATIENT
Start: 2025-08-03 | End: 2025-08-12 | Stop reason: HOSPADM

## 2025-08-03 RX ORDER — FUROSEMIDE 20 MG/1
20 TABLET ORAL 2 TIMES DAILY
Status: CANCELLED | OUTPATIENT
Start: 2025-08-03

## 2025-08-03 RX ORDER — NITROGLYCERIN 0.4 MG/1
0.4 TABLET SUBLINGUAL
Status: DISCONTINUED | OUTPATIENT
Start: 2025-08-03 | End: 2025-08-12 | Stop reason: HOSPADM

## 2025-08-03 RX ORDER — IPRATROPIUM BROMIDE AND ALBUTEROL SULFATE 2.5; .5 MG/3ML; MG/3ML
3 SOLUTION RESPIRATORY (INHALATION) EVERY 4 HOURS PRN
Status: DISCONTINUED | OUTPATIENT
Start: 2025-08-03 | End: 2025-08-12 | Stop reason: HOSPADM

## 2025-08-03 RX ORDER — BUDESONIDE AND FORMOTEROL FUMARATE DIHYDRATE 160; 4.5 UG/1; UG/1
2 AEROSOL RESPIRATORY (INHALATION)
Status: DISCONTINUED | OUTPATIENT
Start: 2025-08-03 | End: 2025-08-12 | Stop reason: HOSPADM

## 2025-08-03 RX ORDER — SODIUM CHLORIDE 9 MG/ML
40 INJECTION, SOLUTION INTRAVENOUS AS NEEDED
Status: DISCONTINUED | OUTPATIENT
Start: 2025-08-03 | End: 2025-08-12 | Stop reason: HOSPADM

## 2025-08-03 RX ORDER — POTASSIUM CHLORIDE 1.5 G/1.58G
20 POWDER, FOR SOLUTION ORAL DAILY
Status: CANCELLED | OUTPATIENT
Start: 2025-08-03

## 2025-08-03 RX ORDER — GUAIFENESIN 600 MG/1
1200 TABLET, EXTENDED RELEASE ORAL 2 TIMES DAILY
Status: DISCONTINUED | OUTPATIENT
Start: 2025-08-03 | End: 2025-08-12 | Stop reason: HOSPADM

## 2025-08-03 RX ORDER — SUCRALFATE 1 G/1
1 TABLET ORAL 4 TIMES DAILY
Status: DISCONTINUED | OUTPATIENT
Start: 2025-08-03 | End: 2025-08-12 | Stop reason: HOSPADM

## 2025-08-03 RX ORDER — SODIUM CHLORIDE FOR INHALATION 7 %
4 VIAL, NEBULIZER (ML) INHALATION EVERY 4 HOURS PRN
Status: DISCONTINUED | OUTPATIENT
Start: 2025-08-03 | End: 2025-08-03

## 2025-08-03 RX ORDER — ONDANSETRON 4 MG/1
4 TABLET, ORALLY DISINTEGRATING ORAL EVERY 6 HOURS PRN
Status: DISCONTINUED | OUTPATIENT
Start: 2025-08-03 | End: 2025-08-12 | Stop reason: HOSPADM

## 2025-08-03 RX ORDER — DOCUSATE SODIUM 100 MG/1
200 CAPSULE, LIQUID FILLED ORAL 2 TIMES DAILY
Status: DISCONTINUED | OUTPATIENT
Start: 2025-08-03 | End: 2025-08-12 | Stop reason: HOSPADM

## 2025-08-03 RX ORDER — AMOXICILLIN 250 MG
2 CAPSULE ORAL 2 TIMES DAILY PRN
Status: DISCONTINUED | OUTPATIENT
Start: 2025-08-03 | End: 2025-08-03

## 2025-08-03 RX ORDER — PANTOPRAZOLE SODIUM 40 MG/1
40 TABLET, DELAYED RELEASE ORAL DAILY
Status: DISCONTINUED | OUTPATIENT
Start: 2025-08-03 | End: 2025-08-12 | Stop reason: HOSPADM

## 2025-08-03 RX ORDER — BISACODYL 10 MG
10 SUPPOSITORY, RECTAL RECTAL ONCE
Status: COMPLETED | OUTPATIENT
Start: 2025-08-03 | End: 2025-08-03

## 2025-08-03 RX ORDER — SODIUM CHLORIDE 0.9 % (FLUSH) 0.9 %
10 SYRINGE (ML) INJECTION AS NEEDED
Status: DISCONTINUED | OUTPATIENT
Start: 2025-08-03 | End: 2025-08-12 | Stop reason: HOSPADM

## 2025-08-03 RX ORDER — CETIRIZINE HYDROCHLORIDE 10 MG/1
10 TABLET ORAL DAILY
Status: DISCONTINUED | OUTPATIENT
Start: 2025-08-03 | End: 2025-08-12 | Stop reason: HOSPADM

## 2025-08-03 RX ADMIN — FLUOXETINE 20 MG: 20 CAPSULE ORAL at 10:46

## 2025-08-03 RX ADMIN — SODIUM CHLORIDE 500 ML: 9 INJECTION, SOLUTION INTRAVENOUS at 01:43

## 2025-08-03 RX ADMIN — Medication 2.5 MG: at 21:45

## 2025-08-03 RX ADMIN — SUCRALFATE 1 G: 1 TABLET ORAL at 21:45

## 2025-08-03 RX ADMIN — GABAPENTIN 300 MG: 300 CAPSULE ORAL at 21:45

## 2025-08-03 RX ADMIN — FERROUS SULFATE TAB 325 MG (65 MG ELEMENTAL FE) 325 MG: 325 (65 FE) TAB at 10:46

## 2025-08-03 RX ADMIN — SENNOSIDES AND DOCUSATE SODIUM 2 TABLET: 50; 8.6 TABLET ORAL at 10:46

## 2025-08-03 RX ADMIN — POTASSIUM CHLORIDE 40 MEQ: 1500 TABLET, EXTENDED RELEASE ORAL at 16:45

## 2025-08-03 RX ADMIN — SUCRALFATE 1 G: 1 TABLET ORAL at 11:35

## 2025-08-03 RX ADMIN — BISACODYL 10 MG: 10 SUPPOSITORY RECTAL at 16:46

## 2025-08-03 RX ADMIN — GUAIFENESIN 200 MG: 100 LIQUID ORAL at 06:08

## 2025-08-03 RX ADMIN — Medication 10 ML: at 10:47

## 2025-08-03 RX ADMIN — POLYETHYLENE GLYCOL 3350 17 G: 17 POWDER, FOR SOLUTION ORAL at 21:46

## 2025-08-03 RX ADMIN — CETIRIZINE HYDROCHLORIDE 10 MG: 10 TABLET, FILM COATED ORAL at 10:46

## 2025-08-03 RX ADMIN — Medication 4 ML: at 13:03

## 2025-08-03 RX ADMIN — IPRATROPIUM BROMIDE AND ALBUTEROL SULFATE 3 ML: .5; 3 SOLUTION RESPIRATORY (INHALATION) at 07:14

## 2025-08-03 RX ADMIN — DOCUSATE SODIUM 200 MG: 100 CAPSULE, LIQUID FILLED ORAL at 21:45

## 2025-08-03 RX ADMIN — GUAIFENESIN 1200 MG: 600 TABLET, EXTENDED RELEASE ORAL at 21:45

## 2025-08-03 RX ADMIN — BUDESONIDE AND FORMOTEROL FUMARATE DIHYDRATE 2 PUFF: 160; 4.5 AEROSOL RESPIRATORY (INHALATION) at 19:26

## 2025-08-03 RX ADMIN — MENTHOL, ZINC OXIDE 1 APPLICATION: .44; 20.6 OINTMENT TOPICAL at 21:47

## 2025-08-03 RX ADMIN — GABAPENTIN 300 MG: 300 CAPSULE ORAL at 16:45

## 2025-08-03 RX ADMIN — Medication 10 ML: at 06:33

## 2025-08-03 RX ADMIN — POTASSIUM CHLORIDE 40 MEQ: 1500 TABLET, EXTENDED RELEASE ORAL at 10:47

## 2025-08-03 RX ADMIN — METHYLPREDNISOLONE SODIUM SUCCINATE 60 MG: 125 INJECTION INTRAMUSCULAR; INTRAVENOUS at 10:47

## 2025-08-03 RX ADMIN — CEFTRIAXONE 2000 MG: 2 INJECTION, POWDER, FOR SOLUTION INTRAMUSCULAR; INTRAVENOUS at 00:47

## 2025-08-03 RX ADMIN — PANTOPRAZOLE SODIUM 40 MG: 40 TABLET, DELAYED RELEASE ORAL at 10:53

## 2025-08-03 RX ADMIN — IPRATROPIUM BROMIDE AND ALBUTEROL SULFATE 3 ML: .5; 3 SOLUTION RESPIRATORY (INHALATION) at 12:59

## 2025-08-03 RX ADMIN — GABAPENTIN 300 MG: 300 CAPSULE ORAL at 10:45

## 2025-08-03 RX ADMIN — Medication 10 ML: at 21:47

## 2025-08-03 RX ADMIN — IPRATROPIUM BROMIDE AND ALBUTEROL SULFATE 3 ML: .5; 3 SOLUTION RESPIRATORY (INHALATION) at 19:25

## 2025-08-03 RX ADMIN — MENTHOL, ZINC OXIDE 1 APPLICATION: .44; 20.6 OINTMENT TOPICAL at 10:46

## 2025-08-03 RX ADMIN — Medication 4 ML: at 19:25

## 2025-08-03 RX ADMIN — SUCRALFATE 1 G: 1 TABLET ORAL at 17:24

## 2025-08-03 RX ADMIN — POTASSIUM CHLORIDE 40 MEQ: 1500 TABLET, EXTENDED RELEASE ORAL at 03:42

## 2025-08-03 RX ADMIN — VANCOMYCIN HYDROCHLORIDE 1500 MG: 10 INJECTION, POWDER, LYOPHILIZED, FOR SOLUTION INTRAVENOUS at 22:31

## 2025-08-03 RX ADMIN — METHYLPREDNISOLONE SODIUM SUCCINATE 60 MG: 125 INJECTION INTRAMUSCULAR; INTRAVENOUS at 22:40

## 2025-08-04 ENCOUNTER — APPOINTMENT (OUTPATIENT)
Dept: GENERAL RADIOLOGY | Facility: HOSPITAL | Age: 85
DRG: 193 | End: 2025-08-04
Payer: MEDICARE

## 2025-08-04 ENCOUNTER — APPOINTMENT (OUTPATIENT)
Dept: CT IMAGING | Facility: HOSPITAL | Age: 85
DRG: 193 | End: 2025-08-04
Payer: MEDICARE

## 2025-08-04 LAB
ANION GAP SERPL CALCULATED.3IONS-SCNC: 8.8 MMOL/L (ref 5–15)
BACTERIA SPEC RESP CULT: NORMAL
BASOPHILS # BLD AUTO: 0 10*3/MM3 (ref 0–0.2)
BASOPHILS NFR BLD AUTO: 0 % (ref 0–1.5)
BUN SERPL-MCNC: 17 MG/DL (ref 8–23)
BUN/CREAT SERPL: 22.1 (ref 7–25)
CALCIUM SPEC-SCNC: 9.4 MG/DL (ref 8.6–10.5)
CHLORIDE SERPL-SCNC: 107 MMOL/L (ref 98–107)
CO2 SERPL-SCNC: 24.2 MMOL/L (ref 22–29)
CREAT SERPL-MCNC: 0.77 MG/DL (ref 0.76–1.27)
DEPRECATED RDW RBC AUTO: 46.7 FL (ref 37–54)
EGFRCR SERPLBLD CKD-EPI 2021: 86.6 ML/MIN/1.73
EOSINOPHIL # BLD AUTO: 0 10*3/MM3 (ref 0–0.4)
EOSINOPHIL NFR BLD AUTO: 0 % (ref 0.3–6.2)
ERYTHROCYTE [DISTWIDTH] IN BLOOD BY AUTOMATED COUNT: 13.8 % (ref 12.3–15.4)
GLUCOSE SERPL-MCNC: 140 MG/DL (ref 65–99)
GRAM STN SPEC: NORMAL
HCT VFR BLD AUTO: 40.4 % (ref 37.5–51)
HGB BLD-MCNC: 13.5 G/DL (ref 13–17.7)
IMM GRANULOCYTES # BLD AUTO: 0.03 10*3/MM3 (ref 0–0.05)
IMM GRANULOCYTES NFR BLD AUTO: 0.5 % (ref 0–0.5)
L PNEUMO1 AG UR QL IA: NEGATIVE
LYMPHOCYTES # BLD AUTO: 0.9 10*3/MM3 (ref 0.7–3.1)
LYMPHOCYTES NFR BLD AUTO: 14.5 % (ref 19.6–45.3)
MCH RBC QN AUTO: 30.8 PG (ref 26.6–33)
MCHC RBC AUTO-ENTMCNC: 33.4 G/DL (ref 31.5–35.7)
MCV RBC AUTO: 92.2 FL (ref 79–97)
MONOCYTES # BLD AUTO: 0.13 10*3/MM3 (ref 0.1–0.9)
MONOCYTES NFR BLD AUTO: 2.1 % (ref 5–12)
NEUTROPHILS NFR BLD AUTO: 5.16 10*3/MM3 (ref 1.7–7)
NEUTROPHILS NFR BLD AUTO: 82.9 % (ref 42.7–76)
NRBC BLD AUTO-RTO: 0 /100 WBC (ref 0–0.2)
PLATELET # BLD AUTO: 299 10*3/MM3 (ref 140–450)
PMV BLD AUTO: 9.5 FL (ref 6–12)
POTASSIUM SERPL-SCNC: 4.6 MMOL/L (ref 3.5–5.2)
QT INTERVAL: 360 MS
QTC INTERVAL: 459 MS
RBC # BLD AUTO: 4.38 10*6/MM3 (ref 4.14–5.8)
S PNEUM AG SPEC QL LA: NEGATIVE
SODIUM SERPL-SCNC: 140 MMOL/L (ref 136–145)
WBC NRBC COR # BLD AUTO: 6.22 10*3/MM3 (ref 3.4–10.8)

## 2025-08-04 PROCEDURE — 87449 NOS EACH ORGANISM AG IA: CPT | Performed by: NURSE PRACTITIONER

## 2025-08-04 PROCEDURE — 74230 X-RAY XM SWLNG FUNCJ C+: CPT

## 2025-08-04 PROCEDURE — 25010000002 CEFTRIAXONE PER 250 MG: Performed by: NURSE PRACTITIONER

## 2025-08-04 PROCEDURE — 72125 CT NECK SPINE W/O DYE: CPT

## 2025-08-04 PROCEDURE — 97110 THERAPEUTIC EXERCISES: CPT

## 2025-08-04 PROCEDURE — 94664 DEMO&/EVAL PT USE INHALER: CPT

## 2025-08-04 PROCEDURE — 25510000001 IOPAMIDOL PER 1 ML: Performed by: INTERNAL MEDICINE

## 2025-08-04 PROCEDURE — 71275 CT ANGIOGRAPHY CHEST: CPT

## 2025-08-04 PROCEDURE — 80048 BASIC METABOLIC PNL TOTAL CA: CPT | Performed by: INTERNAL MEDICINE

## 2025-08-04 PROCEDURE — 85025 COMPLETE CBC W/AUTO DIFF WBC: CPT | Performed by: INTERNAL MEDICINE

## 2025-08-04 PROCEDURE — 87481 CANDIDA DNA AMP PROBE: CPT | Performed by: INTERNAL MEDICINE

## 2025-08-04 PROCEDURE — 87899 AGENT NOS ASSAY W/OPTIC: CPT | Performed by: NURSE PRACTITIONER

## 2025-08-04 PROCEDURE — 25810000003 SODIUM CHLORIDE 0.9 % SOLUTION: Performed by: INTERNAL MEDICINE

## 2025-08-04 PROCEDURE — 70450 CT HEAD/BRAIN W/O DYE: CPT

## 2025-08-04 PROCEDURE — 92611 MOTION FLUOROSCOPY/SWALLOW: CPT

## 2025-08-04 PROCEDURE — 99222 1ST HOSP IP/OBS MODERATE 55: CPT

## 2025-08-04 PROCEDURE — 25010000002 VANCOMYCIN 10 G RECONSTITUTED SOLUTION: Performed by: INTERNAL MEDICINE

## 2025-08-04 PROCEDURE — 25010000002 METHYLPREDNISOLONE PER 40 MG: Performed by: INTERNAL MEDICINE

## 2025-08-04 PROCEDURE — 94799 UNLISTED PULMONARY SVC/PX: CPT

## 2025-08-04 PROCEDURE — 97162 PT EVAL MOD COMPLEX 30 MIN: CPT

## 2025-08-04 PROCEDURE — 94761 N-INVAS EAR/PLS OXIMETRY MLT: CPT

## 2025-08-04 RX ORDER — IOPAMIDOL 755 MG/ML
100 INJECTION, SOLUTION INTRAVASCULAR
Status: COMPLETED | OUTPATIENT
Start: 2025-08-04 | End: 2025-08-04

## 2025-08-04 RX ORDER — METOPROLOL TARTRATE 25 MG/1
25 TABLET, FILM COATED ORAL EVERY 12 HOURS SCHEDULED
Status: DISCONTINUED | OUTPATIENT
Start: 2025-08-04 | End: 2025-08-12 | Stop reason: HOSPADM

## 2025-08-04 RX ORDER — ASPIRIN 81 MG/1
81 TABLET ORAL DAILY
Status: DISCONTINUED | OUTPATIENT
Start: 2025-08-04 | End: 2025-08-12 | Stop reason: HOSPADM

## 2025-08-04 RX ORDER — METHYLPREDNISOLONE SODIUM SUCCINATE 40 MG/ML
40 INJECTION, POWDER, LYOPHILIZED, FOR SOLUTION INTRAMUSCULAR; INTRAVENOUS EVERY 12 HOURS
Status: DISCONTINUED | OUTPATIENT
Start: 2025-08-04 | End: 2025-08-09

## 2025-08-04 RX ADMIN — GABAPENTIN 300 MG: 300 CAPSULE ORAL at 09:50

## 2025-08-04 RX ADMIN — BUDESONIDE AND FORMOTEROL FUMARATE DIHYDRATE 2 PUFF: 160; 4.5 AEROSOL RESPIRATORY (INHALATION) at 08:01

## 2025-08-04 RX ADMIN — SODIUM CHLORIDE 2000 MG: 900 INJECTION INTRAVENOUS at 00:16

## 2025-08-04 RX ADMIN — SUCRALFATE 1 G: 1 TABLET ORAL at 09:50

## 2025-08-04 RX ADMIN — GABAPENTIN 300 MG: 300 CAPSULE ORAL at 20:24

## 2025-08-04 RX ADMIN — BARIUM SULFATE 4 ML: 980 POWDER, FOR SUSPENSION ORAL at 10:56

## 2025-08-04 RX ADMIN — BARIUM SULFATE 50 ML: 400 SUSPENSION ORAL at 10:56

## 2025-08-04 RX ADMIN — DOCUSATE SODIUM 200 MG: 100 CAPSULE, LIQUID FILLED ORAL at 20:25

## 2025-08-04 RX ADMIN — FLUOXETINE 20 MG: 20 CAPSULE ORAL at 09:51

## 2025-08-04 RX ADMIN — METHYLPREDNISOLONE SODIUM SUCCINATE 40 MG: 40 INJECTION, POWDER, FOR SOLUTION INTRAMUSCULAR; INTRAVENOUS at 11:41

## 2025-08-04 RX ADMIN — FERROUS SULFATE TAB 325 MG (65 MG ELEMENTAL FE) 325 MG: 325 (65 FE) TAB at 09:50

## 2025-08-04 RX ADMIN — IOPAMIDOL 100 ML: 755 INJECTION, SOLUTION INTRAVENOUS at 04:57

## 2025-08-04 RX ADMIN — MENTHOL, ZINC OXIDE 1 APPLICATION: .44; 20.6 OINTMENT TOPICAL at 11:48

## 2025-08-04 RX ADMIN — IPRATROPIUM BROMIDE AND ALBUTEROL SULFATE 3 ML: .5; 3 SOLUTION RESPIRATORY (INHALATION) at 14:13

## 2025-08-04 RX ADMIN — METOPROLOL TARTRATE 25 MG: 25 TABLET, FILM COATED ORAL at 20:25

## 2025-08-04 RX ADMIN — SUCRALFATE 1 G: 1 TABLET ORAL at 20:25

## 2025-08-04 RX ADMIN — GUAIFENESIN 1200 MG: 600 TABLET, EXTENDED RELEASE ORAL at 20:24

## 2025-08-04 RX ADMIN — IPRATROPIUM BROMIDE AND ALBUTEROL SULFATE 3 ML: .5; 3 SOLUTION RESPIRATORY (INHALATION) at 19:48

## 2025-08-04 RX ADMIN — CETIRIZINE HYDROCHLORIDE 10 MG: 10 TABLET, FILM COATED ORAL at 09:51

## 2025-08-04 RX ADMIN — DOCUSATE SODIUM 200 MG: 100 CAPSULE, LIQUID FILLED ORAL at 11:33

## 2025-08-04 RX ADMIN — PANTOPRAZOLE SODIUM 40 MG: 40 TABLET, DELAYED RELEASE ORAL at 09:51

## 2025-08-04 RX ADMIN — GABAPENTIN 300 MG: 300 CAPSULE ORAL at 17:50

## 2025-08-04 RX ADMIN — BUDESONIDE AND FORMOTEROL FUMARATE DIHYDRATE 2 PUFF: 160; 4.5 AEROSOL RESPIRATORY (INHALATION) at 19:58

## 2025-08-04 RX ADMIN — GUAIFENESIN 1200 MG: 600 TABLET, EXTENDED RELEASE ORAL at 09:50

## 2025-08-04 RX ADMIN — POLYETHYLENE GLYCOL 3350 17 G: 17 POWDER, FOR SOLUTION ORAL at 20:26

## 2025-08-04 RX ADMIN — IPRATROPIUM BROMIDE AND ALBUTEROL SULFATE 3 ML: .5; 3 SOLUTION RESPIRATORY (INHALATION) at 07:59

## 2025-08-04 RX ADMIN — MENTHOL, ZINC OXIDE 1 APPLICATION: .44; 20.6 OINTMENT TOPICAL at 20:39

## 2025-08-04 RX ADMIN — Medication 4 ML: at 19:54

## 2025-08-04 RX ADMIN — METOPROLOL TARTRATE 25 MG: 25 TABLET, FILM COATED ORAL at 11:41

## 2025-08-04 RX ADMIN — BARIUM SULFATE 55 ML: 0.81 POWDER, FOR SUSPENSION ORAL at 10:56

## 2025-08-04 RX ADMIN — Medication 10 ML: at 11:48

## 2025-08-04 RX ADMIN — Medication 2.5 MG: at 20:24

## 2025-08-04 RX ADMIN — VANCOMYCIN HYDROCHLORIDE 1500 MG: 10 INJECTION, POWDER, LYOPHILIZED, FOR SOLUTION INTRAVENOUS at 22:19

## 2025-08-04 RX ADMIN — SUCRALFATE 1 G: 1 TABLET ORAL at 11:41

## 2025-08-04 RX ADMIN — SUCRALFATE 1 G: 1 TABLET ORAL at 17:50

## 2025-08-04 RX ADMIN — ASPIRIN 81 MG: 81 TABLET, COATED ORAL at 11:41

## 2025-08-05 ENCOUNTER — ANESTHESIA (OUTPATIENT)
Dept: GASTROENTEROLOGY | Facility: HOSPITAL | Age: 85
End: 2025-08-05
Payer: MEDICARE

## 2025-08-05 ENCOUNTER — ANESTHESIA EVENT (OUTPATIENT)
Dept: GASTROENTEROLOGY | Facility: HOSPITAL | Age: 85
End: 2025-08-05
Payer: MEDICARE

## 2025-08-05 ENCOUNTER — APPOINTMENT (OUTPATIENT)
Dept: MRI IMAGING | Facility: HOSPITAL | Age: 85
DRG: 193 | End: 2025-08-05
Payer: MEDICARE

## 2025-08-05 LAB
ANION GAP SERPL CALCULATED.3IONS-SCNC: 9 MMOL/L (ref 5–15)
BASOPHILS # BLD AUTO: 0.01 10*3/MM3 (ref 0–0.2)
BASOPHILS NFR BLD AUTO: 0.1 % (ref 0–1.5)
BUN SERPL-MCNC: 19 MG/DL (ref 8–23)
BUN/CREAT SERPL: 26.8 (ref 7–25)
C AURIS DNA SPEC QL NAA+NON-PROBE: NOT DETECTED
CALCIUM SPEC-SCNC: 9 MG/DL (ref 8.6–10.5)
CHLORIDE SERPL-SCNC: 109 MMOL/L (ref 98–107)
CO2 SERPL-SCNC: 24 MMOL/L (ref 22–29)
CREAT SERPL-MCNC: 0.71 MG/DL (ref 0.76–1.27)
DEPRECATED RDW RBC AUTO: 49.2 FL (ref 37–54)
EGFRCR SERPLBLD CKD-EPI 2021: 88.8 ML/MIN/1.73
EOSINOPHIL # BLD AUTO: 0 10*3/MM3 (ref 0–0.4)
EOSINOPHIL NFR BLD AUTO: 0 % (ref 0.3–6.2)
ERYTHROCYTE [DISTWIDTH] IN BLOOD BY AUTOMATED COUNT: 14.4 % (ref 12.3–15.4)
GLUCOSE SERPL-MCNC: 131 MG/DL (ref 65–99)
HCT VFR BLD AUTO: 39.9 % (ref 37.5–51)
HGB BLD-MCNC: 13 G/DL (ref 13–17.7)
IMM GRANULOCYTES # BLD AUTO: 0.02 10*3/MM3 (ref 0–0.05)
IMM GRANULOCYTES NFR BLD AUTO: 0.3 % (ref 0–0.5)
LYMPHOCYTES # BLD AUTO: 0.75 10*3/MM3 (ref 0.7–3.1)
LYMPHOCYTES NFR BLD AUTO: 9.5 % (ref 19.6–45.3)
MCH RBC QN AUTO: 31.1 PG (ref 26.6–33)
MCHC RBC AUTO-ENTMCNC: 32.6 G/DL (ref 31.5–35.7)
MCV RBC AUTO: 95.5 FL (ref 79–97)
MONOCYTES # BLD AUTO: 0.21 10*3/MM3 (ref 0.1–0.9)
MONOCYTES NFR BLD AUTO: 2.7 % (ref 5–12)
NEUTROPHILS NFR BLD AUTO: 6.87 10*3/MM3 (ref 1.7–7)
NEUTROPHILS NFR BLD AUTO: 87.4 % (ref 42.7–76)
NRBC BLD AUTO-RTO: 0 /100 WBC (ref 0–0.2)
PLATELET # BLD AUTO: 292 10*3/MM3 (ref 140–450)
PMV BLD AUTO: 9.9 FL (ref 6–12)
POTASSIUM SERPL-SCNC: 4 MMOL/L (ref 3.5–5.2)
RBC # BLD AUTO: 4.18 10*6/MM3 (ref 4.14–5.8)
SODIUM SERPL-SCNC: 142 MMOL/L (ref 136–145)
VANCOMYCIN TROUGH SERPL-MCNC: 10.2 MCG/ML (ref 5–20)
WBC NRBC COR # BLD AUTO: 7.86 10*3/MM3 (ref 3.4–10.8)

## 2025-08-05 PROCEDURE — 25010000002 LIDOCAINE PF 2% 2 % SOLUTION: Performed by: NURSE ANESTHETIST, CERTIFIED REGISTERED

## 2025-08-05 PROCEDURE — 99233 SBSQ HOSP IP/OBS HIGH 50: CPT | Performed by: NURSE PRACTITIONER

## 2025-08-05 PROCEDURE — 88305 TISSUE EXAM BY PATHOLOGIST: CPT | Performed by: INTERNAL MEDICINE

## 2025-08-05 PROCEDURE — 87205 SMEAR GRAM STAIN: CPT | Performed by: INTERNAL MEDICINE

## 2025-08-05 PROCEDURE — 25810000003 SODIUM CHLORIDE 0.9 % SOLUTION: Performed by: INTERNAL MEDICINE

## 2025-08-05 PROCEDURE — 80048 BASIC METABOLIC PNL TOTAL CA: CPT | Performed by: INTERNAL MEDICINE

## 2025-08-05 PROCEDURE — 94799 UNLISTED PULMONARY SVC/PX: CPT

## 2025-08-05 PROCEDURE — 25010000002 VANCOMYCIN 10 G RECONSTITUTED SOLUTION: Performed by: INTERNAL MEDICINE

## 2025-08-05 PROCEDURE — 25010000002 PROPOFOL 200 MG/20ML EMULSION: Performed by: NURSE ANESTHETIST, CERTIFIED REGISTERED

## 2025-08-05 PROCEDURE — 25010000002 METHYLPREDNISOLONE PER 40 MG: Performed by: INTERNAL MEDICINE

## 2025-08-05 PROCEDURE — 80202 ASSAY OF VANCOMYCIN: CPT | Performed by: INTERNAL MEDICINE

## 2025-08-05 PROCEDURE — 94761 N-INVAS EAR/PLS OXIMETRY MLT: CPT

## 2025-08-05 PROCEDURE — 87071 CULTURE AEROBIC QUANT OTHER: CPT | Performed by: INTERNAL MEDICINE

## 2025-08-05 PROCEDURE — 25010000002 LIDOCAINE PF 1% 1 % SOLUTION: Performed by: INTERNAL MEDICINE

## 2025-08-05 PROCEDURE — 25010000002 CEFTRIAXONE PER 250 MG: Performed by: NURSE PRACTITIONER

## 2025-08-05 PROCEDURE — 88112 CYTOPATH CELL ENHANCE TECH: CPT | Performed by: INTERNAL MEDICINE

## 2025-08-05 PROCEDURE — 25010000002 GLYCOPYRROLATE 0.2 MG/ML SOLUTION: Performed by: NURSE ANESTHETIST, CERTIFIED REGISTERED

## 2025-08-05 PROCEDURE — 0BC68ZZ EXTIRPATION OF MATTER FROM RIGHT LOWER LOBE BRONCHUS, VIA NATURAL OR ARTIFICIAL OPENING ENDOSCOPIC: ICD-10-PCS | Performed by: INTERNAL MEDICINE

## 2025-08-05 PROCEDURE — 0B9F8ZX DRAINAGE OF RIGHT LOWER LUNG LOBE, VIA NATURAL OR ARTIFICIAL OPENING ENDOSCOPIC, DIAGNOSTIC: ICD-10-PCS | Performed by: INTERNAL MEDICINE

## 2025-08-05 PROCEDURE — 25010000002 LIDOCAINE PF 2% 2 % SOLUTION: Performed by: INTERNAL MEDICINE

## 2025-08-05 PROCEDURE — 25010000002 PHENYLEPHRINE 10 MG/ML SOLUTION: Performed by: NURSE ANESTHETIST, CERTIFIED REGISTERED

## 2025-08-05 PROCEDURE — 85025 COMPLETE CBC W/AUTO DIFF WBC: CPT | Performed by: INTERNAL MEDICINE

## 2025-08-05 RX ORDER — IPRATROPIUM BROMIDE AND ALBUTEROL SULFATE 2.5; .5 MG/3ML; MG/3ML
3 SOLUTION RESPIRATORY (INHALATION) ONCE
Status: COMPLETED | OUTPATIENT
Start: 2025-08-05 | End: 2025-08-05

## 2025-08-05 RX ORDER — PHENYLEPHRINE HYDROCHLORIDE 10 MG/ML
INJECTION INTRAVENOUS AS NEEDED
Status: DISCONTINUED | OUTPATIENT
Start: 2025-08-05 | End: 2025-08-05 | Stop reason: SURG

## 2025-08-05 RX ORDER — PROPOFOL 10 MG/ML
INJECTION, EMULSION INTRAVENOUS AS NEEDED
Status: DISCONTINUED | OUTPATIENT
Start: 2025-08-05 | End: 2025-08-05 | Stop reason: SURG

## 2025-08-05 RX ORDER — LIDOCAINE HYDROCHLORIDE 20 MG/ML
INJECTION, SOLUTION EPIDURAL; INFILTRATION; INTRACAUDAL; PERINEURAL AS NEEDED
Status: DISCONTINUED | OUTPATIENT
Start: 2025-08-05 | End: 2025-08-05 | Stop reason: SURG

## 2025-08-05 RX ORDER — LIDOCAINE HYDROCHLORIDE 20 MG/ML
INJECTION, SOLUTION EPIDURAL; INFILTRATION; INTRACAUDAL; PERINEURAL AS NEEDED
Status: DISCONTINUED | OUTPATIENT
Start: 2025-08-05 | End: 2025-08-05 | Stop reason: HOSPADM

## 2025-08-05 RX ORDER — SODIUM CHLORIDE 9 MG/ML
30 INJECTION, SOLUTION INTRAVENOUS CONTINUOUS PRN
Status: ACTIVE | OUTPATIENT
Start: 2025-08-05 | End: 2025-08-05

## 2025-08-05 RX ORDER — LIDOCAINE HYDROCHLORIDE 10 MG/ML
INJECTION, SOLUTION EPIDURAL; INFILTRATION; INTRACAUDAL; PERINEURAL AS NEEDED
Status: DISCONTINUED | OUTPATIENT
Start: 2025-08-05 | End: 2025-08-05 | Stop reason: HOSPADM

## 2025-08-05 RX ORDER — GLYCOPYRROLATE 0.2 MG/ML
INJECTION INTRAMUSCULAR; INTRAVENOUS AS NEEDED
Status: DISCONTINUED | OUTPATIENT
Start: 2025-08-05 | End: 2025-08-05 | Stop reason: SURG

## 2025-08-05 RX ADMIN — METOPROLOL TARTRATE 25 MG: 25 TABLET, FILM COATED ORAL at 08:08

## 2025-08-05 RX ADMIN — PROPOFOL 50 MG: 10 INJECTION, EMULSION INTRAVENOUS at 12:18

## 2025-08-05 RX ADMIN — POLYETHYLENE GLYCOL 3350 17 G: 17 POWDER, FOR SOLUTION ORAL at 20:53

## 2025-08-05 RX ADMIN — Medication 10 ML: at 08:08

## 2025-08-05 RX ADMIN — SODIUM CHLORIDE 30 ML/HR: 9 INJECTION, SOLUTION INTRAVENOUS at 11:39

## 2025-08-05 RX ADMIN — GUAIFENESIN 1200 MG: 600 TABLET, EXTENDED RELEASE ORAL at 20:52

## 2025-08-05 RX ADMIN — Medication 2.5 MG: at 20:53

## 2025-08-05 RX ADMIN — IPRATROPIUM BROMIDE AND ALBUTEROL SULFATE 3 ML: .5; 3 SOLUTION RESPIRATORY (INHALATION) at 20:01

## 2025-08-05 RX ADMIN — PHENYLEPHRINE HYDROCHLORIDE 100 MCG: 10 INJECTION INTRAVENOUS at 12:45

## 2025-08-05 RX ADMIN — MENTHOL, ZINC OXIDE 1 APPLICATION: .44; 20.6 OINTMENT TOPICAL at 20:00

## 2025-08-05 RX ADMIN — METHYLPREDNISOLONE SODIUM SUCCINATE 40 MG: 40 INJECTION, POWDER, FOR SOLUTION INTRAMUSCULAR; INTRAVENOUS at 14:24

## 2025-08-05 RX ADMIN — CETIRIZINE HYDROCHLORIDE 10 MG: 10 TABLET, FILM COATED ORAL at 14:25

## 2025-08-05 RX ADMIN — ASPIRIN 81 MG: 81 TABLET, COATED ORAL at 14:25

## 2025-08-05 RX ADMIN — PANTOPRAZOLE SODIUM 40 MG: 40 TABLET, DELAYED RELEASE ORAL at 14:25

## 2025-08-05 RX ADMIN — GLYCOPYRROLATE 0.2 MG: 0.2 INJECTION INTRAMUSCULAR; INTRAVENOUS at 12:14

## 2025-08-05 RX ADMIN — FERROUS SULFATE TAB 325 MG (65 MG ELEMENTAL FE) 325 MG: 325 (65 FE) TAB at 14:24

## 2025-08-05 RX ADMIN — DOCUSATE SODIUM 200 MG: 100 CAPSULE, LIQUID FILLED ORAL at 20:53

## 2025-08-05 RX ADMIN — FLUOXETINE 20 MG: 20 CAPSULE ORAL at 14:25

## 2025-08-05 RX ADMIN — PROPOFOL 30 MG: 10 INJECTION, EMULSION INTRAVENOUS at 12:32

## 2025-08-05 RX ADMIN — PROPOFOL 140 MCG/KG/MIN: 10 INJECTION, EMULSION INTRAVENOUS at 12:19

## 2025-08-05 RX ADMIN — VANCOMYCIN HYDROCHLORIDE 1500 MG: 10 INJECTION, POWDER, LYOPHILIZED, FOR SOLUTION INTRAVENOUS at 22:59

## 2025-08-05 RX ADMIN — Medication 4 ML: at 20:02

## 2025-08-05 RX ADMIN — GABAPENTIN 300 MG: 300 CAPSULE ORAL at 20:53

## 2025-08-05 RX ADMIN — METOPROLOL TARTRATE 25 MG: 25 TABLET, FILM COATED ORAL at 20:53

## 2025-08-05 RX ADMIN — PHENYLEPHRINE HYDROCHLORIDE 100 MCG: 10 INJECTION INTRAVENOUS at 12:30

## 2025-08-05 RX ADMIN — SUCRALFATE 1 G: 1 TABLET ORAL at 20:52

## 2025-08-05 RX ADMIN — MENTHOL, ZINC OXIDE 1 APPLICATION: .44; 20.6 OINTMENT TOPICAL at 09:33

## 2025-08-05 RX ADMIN — SODIUM CHLORIDE 2000 MG: 900 INJECTION INTRAVENOUS at 00:21

## 2025-08-05 RX ADMIN — PROPOFOL 30 MG: 10 INJECTION, EMULSION INTRAVENOUS at 12:20

## 2025-08-05 RX ADMIN — LIDOCAINE HYDROCHLORIDE 60 MG: 20 INJECTION, SOLUTION EPIDURAL; INFILTRATION; INTRACAUDAL; PERINEURAL at 12:17

## 2025-08-05 RX ADMIN — PROPOFOL 50 MG: 10 INJECTION, EMULSION INTRAVENOUS at 12:17

## 2025-08-05 RX ADMIN — BUDESONIDE AND FORMOTEROL FUMARATE DIHYDRATE 2 PUFF: 160; 4.5 AEROSOL RESPIRATORY (INHALATION) at 20:05

## 2025-08-05 RX ADMIN — METHYLPREDNISOLONE SODIUM SUCCINATE 40 MG: 40 INJECTION, POWDER, FOR SOLUTION INTRAMUSCULAR; INTRAVENOUS at 00:21

## 2025-08-05 RX ADMIN — IPRATROPIUM BROMIDE AND ALBUTEROL SULFATE 3 ML: .5; 3 SOLUTION RESPIRATORY (INHALATION) at 11:44

## 2025-08-05 RX ADMIN — GABAPENTIN 300 MG: 300 CAPSULE ORAL at 14:25

## 2025-08-06 ENCOUNTER — APPOINTMENT (OUTPATIENT)
Dept: MRI IMAGING | Facility: HOSPITAL | Age: 85
DRG: 193 | End: 2025-08-06
Payer: MEDICARE

## 2025-08-06 LAB
ANION GAP SERPL CALCULATED.3IONS-SCNC: 7 MMOL/L (ref 5–15)
BASOPHILS # BLD AUTO: 0 10*3/MM3 (ref 0–0.2)
BASOPHILS NFR BLD AUTO: 0 % (ref 0–1.5)
BUN SERPL-MCNC: 20 MG/DL (ref 8–23)
BUN/CREAT SERPL: 30.3 (ref 7–25)
CALCIUM SPEC-SCNC: 8.6 MG/DL (ref 8.6–10.5)
CHLORIDE SERPL-SCNC: 109 MMOL/L (ref 98–107)
CO2 SERPL-SCNC: 23 MMOL/L (ref 22–29)
CREAT SERPL-MCNC: 0.66 MG/DL (ref 0.76–1.27)
DEPRECATED RDW RBC AUTO: 51.2 FL (ref 37–54)
EGFRCR SERPLBLD CKD-EPI 2021: 90.8 ML/MIN/1.73
EOSINOPHIL # BLD AUTO: 0 10*3/MM3 (ref 0–0.4)
EOSINOPHIL NFR BLD AUTO: 0 % (ref 0.3–6.2)
ERYTHROCYTE [DISTWIDTH] IN BLOOD BY AUTOMATED COUNT: 14.7 % (ref 12.3–15.4)
GLUCOSE SERPL-MCNC: 125 MG/DL (ref 65–99)
HCT VFR BLD AUTO: 36.1 % (ref 37.5–51)
HGB BLD-MCNC: 12 G/DL (ref 13–17.7)
IMM GRANULOCYTES # BLD AUTO: 0.03 10*3/MM3 (ref 0–0.05)
IMM GRANULOCYTES NFR BLD AUTO: 0.5 % (ref 0–0.5)
LAB AP CASE REPORT: NORMAL
LYMPHOCYTES # BLD AUTO: 0.86 10*3/MM3 (ref 0.7–3.1)
LYMPHOCYTES NFR BLD AUTO: 13.7 % (ref 19.6–45.3)
MCH RBC QN AUTO: 31.3 PG (ref 26.6–33)
MCHC RBC AUTO-ENTMCNC: 33.2 G/DL (ref 31.5–35.7)
MCV RBC AUTO: 94.3 FL (ref 79–97)
MONOCYTES # BLD AUTO: 0.2 10*3/MM3 (ref 0.1–0.9)
MONOCYTES NFR BLD AUTO: 3.2 % (ref 5–12)
NEUTROPHILS NFR BLD AUTO: 5.17 10*3/MM3 (ref 1.7–7)
NEUTROPHILS NFR BLD AUTO: 82.6 % (ref 42.7–76)
NRBC BLD AUTO-RTO: 0 /100 WBC (ref 0–0.2)
PATH REPORT.FINAL DX SPEC: NORMAL
PATH REPORT.GROSS SPEC: NORMAL
PLATELET # BLD AUTO: 264 10*3/MM3 (ref 140–450)
PMV BLD AUTO: 9.8 FL (ref 6–12)
POTASSIUM SERPL-SCNC: 4 MMOL/L (ref 3.5–5.2)
RBC # BLD AUTO: 3.83 10*6/MM3 (ref 4.14–5.8)
SODIUM SERPL-SCNC: 139 MMOL/L (ref 136–145)
WBC NRBC COR # BLD AUTO: 6.26 10*3/MM3 (ref 3.4–10.8)

## 2025-08-06 PROCEDURE — A9577 INJ MULTIHANCE: HCPCS | Performed by: INTERNAL MEDICINE

## 2025-08-06 PROCEDURE — 25810000003 SODIUM CHLORIDE 0.9 % SOLUTION: Performed by: INTERNAL MEDICINE

## 2025-08-06 PROCEDURE — 94799 UNLISTED PULMONARY SVC/PX: CPT

## 2025-08-06 PROCEDURE — 85025 COMPLETE CBC W/AUTO DIFF WBC: CPT | Performed by: INTERNAL MEDICINE

## 2025-08-06 PROCEDURE — 72156 MRI NECK SPINE W/O & W/DYE: CPT

## 2025-08-06 PROCEDURE — 25010000002 METHYLPREDNISOLONE PER 40 MG: Performed by: INTERNAL MEDICINE

## 2025-08-06 PROCEDURE — 25010000002 VANCOMYCIN 10 G RECONSTITUTED SOLUTION: Performed by: INTERNAL MEDICINE

## 2025-08-06 PROCEDURE — 25010000002 CEFTRIAXONE PER 250 MG: Performed by: NURSE PRACTITIONER

## 2025-08-06 PROCEDURE — 94664 DEMO&/EVAL PT USE INHALER: CPT

## 2025-08-06 PROCEDURE — 70553 MRI BRAIN STEM W/O & W/DYE: CPT

## 2025-08-06 PROCEDURE — 99232 SBSQ HOSP IP/OBS MODERATE 35: CPT | Performed by: NURSE PRACTITIONER

## 2025-08-06 PROCEDURE — 25510000002 GADOBENATE DIMEGLUMINE 529 MG/ML SOLUTION: Performed by: INTERNAL MEDICINE

## 2025-08-06 PROCEDURE — 94761 N-INVAS EAR/PLS OXIMETRY MLT: CPT

## 2025-08-06 PROCEDURE — 94760 N-INVAS EAR/PLS OXIMETRY 1: CPT

## 2025-08-06 PROCEDURE — 80048 BASIC METABOLIC PNL TOTAL CA: CPT | Performed by: INTERNAL MEDICINE

## 2025-08-06 RX ORDER — ACETYLCYSTEINE 200 MG/ML
4 SOLUTION ORAL; RESPIRATORY (INHALATION)
Status: DISCONTINUED | OUTPATIENT
Start: 2025-08-06 | End: 2025-08-12 | Stop reason: HOSPADM

## 2025-08-06 RX ADMIN — METHYLPREDNISOLONE SODIUM SUCCINATE 40 MG: 40 INJECTION, POWDER, FOR SOLUTION INTRAMUSCULAR; INTRAVENOUS at 00:30

## 2025-08-06 RX ADMIN — BUDESONIDE AND FORMOTEROL FUMARATE DIHYDRATE 2 PUFF: 160; 4.5 AEROSOL RESPIRATORY (INHALATION) at 21:14

## 2025-08-06 RX ADMIN — GUAIFENESIN 1200 MG: 600 TABLET, EXTENDED RELEASE ORAL at 11:55

## 2025-08-06 RX ADMIN — SODIUM CHLORIDE 2000 MG: 900 INJECTION INTRAVENOUS at 00:31

## 2025-08-06 RX ADMIN — MENTHOL, ZINC OXIDE 1 APPLICATION: .44; 20.6 OINTMENT TOPICAL at 21:30

## 2025-08-06 RX ADMIN — Medication 10 ML: at 21:41

## 2025-08-06 RX ADMIN — Medication 10 ML: at 08:58

## 2025-08-06 RX ADMIN — METHYLPREDNISOLONE SODIUM SUCCINATE 40 MG: 40 INJECTION, POWDER, FOR SOLUTION INTRAMUSCULAR; INTRAVENOUS at 11:41

## 2025-08-06 RX ADMIN — VANCOMYCIN HYDROCHLORIDE 1500 MG: 10 INJECTION, POWDER, LYOPHILIZED, FOR SOLUTION INTRAVENOUS at 21:29

## 2025-08-06 RX ADMIN — Medication 2.5 MG: at 21:29

## 2025-08-06 RX ADMIN — ACETAMINOPHEN 650 MG: 325 TABLET ORAL at 11:41

## 2025-08-06 RX ADMIN — SUCRALFATE 1 G: 1 TABLET ORAL at 17:19

## 2025-08-06 RX ADMIN — GABAPENTIN 300 MG: 300 CAPSULE ORAL at 21:29

## 2025-08-06 RX ADMIN — FERROUS SULFATE TAB 325 MG (65 MG ELEMENTAL FE) 325 MG: 325 (65 FE) TAB at 11:55

## 2025-08-06 RX ADMIN — GADOBENATE DIMEGLUMINE 15 ML: 529 INJECTION, SOLUTION INTRAVENOUS at 04:27

## 2025-08-06 RX ADMIN — ASPIRIN 81 MG: 81 TABLET, COATED ORAL at 11:54

## 2025-08-06 RX ADMIN — DOCUSATE SODIUM 200 MG: 100 CAPSULE, LIQUID FILLED ORAL at 21:29

## 2025-08-06 RX ADMIN — Medication 4 ML: at 07:50

## 2025-08-06 RX ADMIN — MENTHOL, ZINC OXIDE 1 APPLICATION: .44; 20.6 OINTMENT TOPICAL at 08:57

## 2025-08-06 RX ADMIN — CETIRIZINE HYDROCHLORIDE 10 MG: 10 TABLET, FILM COATED ORAL at 11:55

## 2025-08-06 RX ADMIN — GUAIFENESIN 1200 MG: 600 TABLET, EXTENDED RELEASE ORAL at 21:29

## 2025-08-06 RX ADMIN — SUCRALFATE 1 G: 1 TABLET ORAL at 11:56

## 2025-08-06 RX ADMIN — IPRATROPIUM BROMIDE AND ALBUTEROL SULFATE 3 ML: .5; 3 SOLUTION RESPIRATORY (INHALATION) at 11:51

## 2025-08-06 RX ADMIN — SUCRALFATE 1 G: 1 TABLET ORAL at 21:29

## 2025-08-06 RX ADMIN — GABAPENTIN 300 MG: 300 CAPSULE ORAL at 17:19

## 2025-08-06 RX ADMIN — IPRATROPIUM BROMIDE AND ALBUTEROL SULFATE 3 ML: .5; 3 SOLUTION RESPIRATORY (INHALATION) at 07:50

## 2025-08-06 RX ADMIN — DOCUSATE SODIUM 200 MG: 100 CAPSULE, LIQUID FILLED ORAL at 11:55

## 2025-08-06 RX ADMIN — METOPROLOL TARTRATE 25 MG: 25 TABLET, FILM COATED ORAL at 21:29

## 2025-08-06 RX ADMIN — BUDESONIDE AND FORMOTEROL FUMARATE DIHYDRATE 2 PUFF: 160; 4.5 AEROSOL RESPIRATORY (INHALATION) at 11:51

## 2025-08-06 RX ADMIN — POLYETHYLENE GLYCOL 3350 17 G: 17 POWDER, FOR SOLUTION ORAL at 11:56

## 2025-08-06 RX ADMIN — IPRATROPIUM BROMIDE AND ALBUTEROL SULFATE 3 ML: .5; 3 SOLUTION RESPIRATORY (INHALATION) at 21:13

## 2025-08-06 RX ADMIN — PANTOPRAZOLE SODIUM 40 MG: 40 TABLET, DELAYED RELEASE ORAL at 11:56

## 2025-08-06 RX ADMIN — FLUOXETINE 20 MG: 20 CAPSULE ORAL at 11:55

## 2025-08-06 RX ADMIN — GABAPENTIN 300 MG: 300 CAPSULE ORAL at 11:55

## 2025-08-06 RX ADMIN — ACETYLCYSTEINE 4 ML: 200 SOLUTION ORAL; RESPIRATORY (INHALATION) at 21:14

## 2025-08-07 LAB
ANION GAP SERPL CALCULATED.3IONS-SCNC: 10.5 MMOL/L (ref 5–15)
BACTERIA SPEC AEROBE CULT: NORMAL
BASOPHILS # BLD AUTO: 0 10*3/MM3 (ref 0–0.2)
BASOPHILS NFR BLD AUTO: 0 % (ref 0–1.5)
BUN SERPL-MCNC: 26 MG/DL (ref 8–23)
BUN/CREAT SERPL: 32.9 (ref 7–25)
CALCIUM SPEC-SCNC: 8.2 MG/DL (ref 8.6–10.5)
CHLORIDE SERPL-SCNC: 111 MMOL/L (ref 98–107)
CO2 SERPL-SCNC: 21.5 MMOL/L (ref 22–29)
CREAT SERPL-MCNC: 0.79 MG/DL (ref 0.76–1.27)
DEPRECATED RDW RBC AUTO: 51.7 FL (ref 37–54)
EGFRCR SERPLBLD CKD-EPI 2021: 86 ML/MIN/1.73
EOSINOPHIL # BLD AUTO: 0 10*3/MM3 (ref 0–0.4)
EOSINOPHIL NFR BLD AUTO: 0 % (ref 0.3–6.2)
ERYTHROCYTE [DISTWIDTH] IN BLOOD BY AUTOMATED COUNT: 14.8 % (ref 12.3–15.4)
GLUCOSE SERPL-MCNC: 123 MG/DL (ref 65–99)
GRAM STN SPEC: NORMAL
HCT VFR BLD AUTO: 36.4 % (ref 37.5–51)
HGB BLD-MCNC: 11.7 G/DL (ref 13–17.7)
IMM GRANULOCYTES # BLD AUTO: 0.05 10*3/MM3 (ref 0–0.05)
IMM GRANULOCYTES NFR BLD AUTO: 0.7 % (ref 0–0.5)
LYMPHOCYTES # BLD AUTO: 0.83 10*3/MM3 (ref 0.7–3.1)
LYMPHOCYTES NFR BLD AUTO: 11.2 % (ref 19.6–45.3)
MCH RBC QN AUTO: 30.5 PG (ref 26.6–33)
MCHC RBC AUTO-ENTMCNC: 32.1 G/DL (ref 31.5–35.7)
MCV RBC AUTO: 95 FL (ref 79–97)
MONOCYTES # BLD AUTO: 0.28 10*3/MM3 (ref 0.1–0.9)
MONOCYTES NFR BLD AUTO: 3.8 % (ref 5–12)
NEUTROPHILS NFR BLD AUTO: 6.27 10*3/MM3 (ref 1.7–7)
NEUTROPHILS NFR BLD AUTO: 84.3 % (ref 42.7–76)
NRBC BLD AUTO-RTO: 0 /100 WBC (ref 0–0.2)
PLATELET # BLD AUTO: 249 10*3/MM3 (ref 140–450)
PMV BLD AUTO: 9.8 FL (ref 6–12)
POTASSIUM SERPL-SCNC: 3.6 MMOL/L (ref 3.5–5.2)
POTASSIUM SERPL-SCNC: 4 MMOL/L (ref 3.5–5.2)
RBC # BLD AUTO: 3.83 10*6/MM3 (ref 4.14–5.8)
SODIUM SERPL-SCNC: 143 MMOL/L (ref 136–145)
WBC NRBC COR # BLD AUTO: 7.43 10*3/MM3 (ref 3.4–10.8)

## 2025-08-07 PROCEDURE — 25010000002 CEFTRIAXONE PER 250 MG: Performed by: NURSE PRACTITIONER

## 2025-08-07 PROCEDURE — 99232 SBSQ HOSP IP/OBS MODERATE 35: CPT | Performed by: NURSE PRACTITIONER

## 2025-08-07 PROCEDURE — 25010000002 METHYLPREDNISOLONE PER 40 MG: Performed by: INTERNAL MEDICINE

## 2025-08-07 PROCEDURE — 84132 ASSAY OF SERUM POTASSIUM: CPT | Performed by: INTERNAL MEDICINE

## 2025-08-07 PROCEDURE — 94761 N-INVAS EAR/PLS OXIMETRY MLT: CPT

## 2025-08-07 PROCEDURE — 94664 DEMO&/EVAL PT USE INHALER: CPT

## 2025-08-07 PROCEDURE — 94799 UNLISTED PULMONARY SVC/PX: CPT

## 2025-08-07 PROCEDURE — 80048 BASIC METABOLIC PNL TOTAL CA: CPT | Performed by: INTERNAL MEDICINE

## 2025-08-07 PROCEDURE — 85025 COMPLETE CBC W/AUTO DIFF WBC: CPT | Performed by: INTERNAL MEDICINE

## 2025-08-07 PROCEDURE — 99221 1ST HOSP IP/OBS SF/LOW 40: CPT | Performed by: NURSE PRACTITIONER

## 2025-08-07 PROCEDURE — 94760 N-INVAS EAR/PLS OXIMETRY 1: CPT

## 2025-08-07 RX ORDER — DOXYCYCLINE 100 MG/1
100 CAPSULE ORAL EVERY 12 HOURS SCHEDULED
Status: DISCONTINUED | OUTPATIENT
Start: 2025-08-07 | End: 2025-08-07

## 2025-08-07 RX ORDER — POTASSIUM CHLORIDE 1500 MG/1
40 TABLET, EXTENDED RELEASE ORAL EVERY 4 HOURS
Status: COMPLETED | OUTPATIENT
Start: 2025-08-07 | End: 2025-08-07

## 2025-08-07 RX ORDER — DOXYCYCLINE 100 MG/1
100 CAPSULE ORAL EVERY 12 HOURS SCHEDULED
Status: COMPLETED | OUTPATIENT
Start: 2025-08-07 | End: 2025-08-09

## 2025-08-07 RX ADMIN — IPRATROPIUM BROMIDE AND ALBUTEROL SULFATE 3 ML: .5; 3 SOLUTION RESPIRATORY (INHALATION) at 21:13

## 2025-08-07 RX ADMIN — GUAIFENESIN 1200 MG: 600 TABLET, EXTENDED RELEASE ORAL at 09:02

## 2025-08-07 RX ADMIN — SUCRALFATE 1 G: 1 TABLET ORAL at 16:47

## 2025-08-07 RX ADMIN — Medication 4 ML: at 06:55

## 2025-08-07 RX ADMIN — DOXYCYCLINE 100 MG: 100 CAPSULE ORAL at 20:53

## 2025-08-07 RX ADMIN — METHYLPREDNISOLONE SODIUM SUCCINATE 40 MG: 40 INJECTION, POWDER, FOR SOLUTION INTRAMUSCULAR; INTRAVENOUS at 12:28

## 2025-08-07 RX ADMIN — POLYETHYLENE GLYCOL 3350 17 G: 17 POWDER, FOR SOLUTION ORAL at 20:52

## 2025-08-07 RX ADMIN — FLUOXETINE 20 MG: 20 CAPSULE ORAL at 09:02

## 2025-08-07 RX ADMIN — Medication 4 ML: at 21:23

## 2025-08-07 RX ADMIN — Medication 10 ML: at 09:05

## 2025-08-07 RX ADMIN — BUDESONIDE AND FORMOTEROL FUMARATE DIHYDRATE 2 PUFF: 160; 4.5 AEROSOL RESPIRATORY (INHALATION) at 06:56

## 2025-08-07 RX ADMIN — Medication 10 ML: at 20:55

## 2025-08-07 RX ADMIN — PANTOPRAZOLE SODIUM 40 MG: 40 TABLET, DELAYED RELEASE ORAL at 09:02

## 2025-08-07 RX ADMIN — ACETYLCYSTEINE 4 ML: 200 SOLUTION ORAL; RESPIRATORY (INHALATION) at 06:57

## 2025-08-07 RX ADMIN — GUAIFENESIN 1200 MG: 600 TABLET, EXTENDED RELEASE ORAL at 20:53

## 2025-08-07 RX ADMIN — POTASSIUM CHLORIDE 40 MEQ: 1500 TABLET, EXTENDED RELEASE ORAL at 09:02

## 2025-08-07 RX ADMIN — GABAPENTIN 300 MG: 300 CAPSULE ORAL at 16:47

## 2025-08-07 RX ADMIN — FERROUS SULFATE TAB 325 MG (65 MG ELEMENTAL FE) 325 MG: 325 (65 FE) TAB at 09:02

## 2025-08-07 RX ADMIN — IPRATROPIUM BROMIDE AND ALBUTEROL SULFATE 3 ML: .5; 3 SOLUTION RESPIRATORY (INHALATION) at 06:55

## 2025-08-07 RX ADMIN — SUCRALFATE 1 G: 1 TABLET ORAL at 09:01

## 2025-08-07 RX ADMIN — GABAPENTIN 300 MG: 300 CAPSULE ORAL at 20:53

## 2025-08-07 RX ADMIN — ASPIRIN 81 MG: 81 TABLET, COATED ORAL at 09:02

## 2025-08-07 RX ADMIN — METHYLPREDNISOLONE SODIUM SUCCINATE 40 MG: 40 INJECTION, POWDER, FOR SOLUTION INTRAMUSCULAR; INTRAVENOUS at 02:55

## 2025-08-07 RX ADMIN — ACETYLCYSTEINE 4 ML: 200 SOLUTION ORAL; RESPIRATORY (INHALATION) at 21:13

## 2025-08-07 RX ADMIN — METOPROLOL TARTRATE 25 MG: 25 TABLET, FILM COATED ORAL at 20:54

## 2025-08-07 RX ADMIN — SODIUM CHLORIDE 2000 MG: 900 INJECTION INTRAVENOUS at 02:55

## 2025-08-07 RX ADMIN — SUCRALFATE 1 G: 1 TABLET ORAL at 12:28

## 2025-08-07 RX ADMIN — DOXYCYCLINE 100 MG: 100 CAPSULE ORAL at 12:28

## 2025-08-07 RX ADMIN — MENTHOL, ZINC OXIDE 1 APPLICATION: .44; 20.6 OINTMENT TOPICAL at 20:52

## 2025-08-07 RX ADMIN — MENTHOL, ZINC OXIDE 1 APPLICATION: .44; 20.6 OINTMENT TOPICAL at 09:02

## 2025-08-07 RX ADMIN — IPRATROPIUM BROMIDE AND ALBUTEROL SULFATE 3 ML: .5; 3 SOLUTION RESPIRATORY (INHALATION) at 14:11

## 2025-08-07 RX ADMIN — GABAPENTIN 300 MG: 300 CAPSULE ORAL at 09:05

## 2025-08-07 RX ADMIN — DOCUSATE SODIUM 200 MG: 100 CAPSULE, LIQUID FILLED ORAL at 20:53

## 2025-08-07 RX ADMIN — SUCRALFATE 1 G: 1 TABLET ORAL at 20:54

## 2025-08-07 RX ADMIN — Medication 2.5 MG: at 20:53

## 2025-08-07 RX ADMIN — POTASSIUM CHLORIDE 40 MEQ: 1500 TABLET, EXTENDED RELEASE ORAL at 12:28

## 2025-08-07 RX ADMIN — BUDESONIDE AND FORMOTEROL FUMARATE DIHYDRATE 2 PUFF: 160; 4.5 AEROSOL RESPIRATORY (INHALATION) at 21:13

## 2025-08-07 RX ADMIN — CETIRIZINE HYDROCHLORIDE 10 MG: 10 TABLET, FILM COATED ORAL at 09:01

## 2025-08-08 LAB
ANION GAP SERPL CALCULATED.3IONS-SCNC: 7.7 MMOL/L (ref 5–15)
BACTERIA SPEC AEROBE CULT: NORMAL
BACTERIA SPEC AEROBE CULT: NORMAL
BASOPHILS # BLD AUTO: 0.01 10*3/MM3 (ref 0–0.2)
BASOPHILS NFR BLD AUTO: 0.1 % (ref 0–1.5)
BUN SERPL-MCNC: 22 MG/DL (ref 8–23)
BUN/CREAT SERPL: 36.7 (ref 7–25)
CALCIUM SPEC-SCNC: 8.3 MG/DL (ref 8.6–10.5)
CHLORIDE SERPL-SCNC: 114 MMOL/L (ref 98–107)
CO2 SERPL-SCNC: 24.3 MMOL/L (ref 22–29)
CREAT SERPL-MCNC: 0.6 MG/DL (ref 0.76–1.27)
DEPRECATED RDW RBC AUTO: 48.3 FL (ref 37–54)
EGFRCR SERPLBLD CKD-EPI 2021: 93.4 ML/MIN/1.73
EOSINOPHIL # BLD AUTO: 0 10*3/MM3 (ref 0–0.4)
EOSINOPHIL NFR BLD AUTO: 0 % (ref 0.3–6.2)
ERYTHROCYTE [DISTWIDTH] IN BLOOD BY AUTOMATED COUNT: 14.2 % (ref 12.3–15.4)
GLUCOSE SERPL-MCNC: 119 MG/DL (ref 65–99)
HCT VFR BLD AUTO: 36.8 % (ref 37.5–51)
HGB BLD-MCNC: 12.3 G/DL (ref 13–17.7)
IMM GRANULOCYTES # BLD AUTO: 0.08 10*3/MM3 (ref 0–0.05)
IMM GRANULOCYTES NFR BLD AUTO: 0.7 % (ref 0–0.5)
LYMPHOCYTES # BLD AUTO: 0.64 10*3/MM3 (ref 0.7–3.1)
LYMPHOCYTES NFR BLD AUTO: 5.7 % (ref 19.6–45.3)
MCH RBC QN AUTO: 31.3 PG (ref 26.6–33)
MCHC RBC AUTO-ENTMCNC: 33.4 G/DL (ref 31.5–35.7)
MCV RBC AUTO: 93.6 FL (ref 79–97)
MONOCYTES # BLD AUTO: 0.59 10*3/MM3 (ref 0.1–0.9)
MONOCYTES NFR BLD AUTO: 5.3 % (ref 5–12)
NEUTROPHILS NFR BLD AUTO: 88.2 % (ref 42.7–76)
NEUTROPHILS NFR BLD AUTO: 9.82 10*3/MM3 (ref 1.7–7)
NRBC BLD AUTO-RTO: 0 /100 WBC (ref 0–0.2)
PLATELET # BLD AUTO: 241 10*3/MM3 (ref 140–450)
PMV BLD AUTO: 9.9 FL (ref 6–12)
POTASSIUM SERPL-SCNC: 3.9 MMOL/L (ref 3.5–5.2)
RBC # BLD AUTO: 3.93 10*6/MM3 (ref 4.14–5.8)
SODIUM SERPL-SCNC: 146 MMOL/L (ref 136–145)
WBC NRBC COR # BLD AUTO: 11.14 10*3/MM3 (ref 3.4–10.8)

## 2025-08-08 PROCEDURE — 94799 UNLISTED PULMONARY SVC/PX: CPT

## 2025-08-08 PROCEDURE — 80048 BASIC METABOLIC PNL TOTAL CA: CPT | Performed by: INTERNAL MEDICINE

## 2025-08-08 PROCEDURE — 85025 COMPLETE CBC W/AUTO DIFF WBC: CPT | Performed by: INTERNAL MEDICINE

## 2025-08-08 PROCEDURE — 94664 DEMO&/EVAL PT USE INHALER: CPT

## 2025-08-08 PROCEDURE — 97530 THERAPEUTIC ACTIVITIES: CPT

## 2025-08-08 PROCEDURE — 25010000002 METHYLPREDNISOLONE PER 40 MG: Performed by: INTERNAL MEDICINE

## 2025-08-08 PROCEDURE — 99233 SBSQ HOSP IP/OBS HIGH 50: CPT | Performed by: NURSE PRACTITIONER

## 2025-08-08 RX ADMIN — GABAPENTIN 300 MG: 300 CAPSULE ORAL at 17:07

## 2025-08-08 RX ADMIN — ACETYLCYSTEINE 4 ML: 200 SOLUTION ORAL; RESPIRATORY (INHALATION) at 19:52

## 2025-08-08 RX ADMIN — Medication 4 ML: at 08:26

## 2025-08-08 RX ADMIN — GABAPENTIN 300 MG: 300 CAPSULE ORAL at 20:33

## 2025-08-08 RX ADMIN — METHYLPREDNISOLONE SODIUM SUCCINATE 40 MG: 40 INJECTION, POWDER, FOR SOLUTION INTRAMUSCULAR; INTRAVENOUS at 11:51

## 2025-08-08 RX ADMIN — GUAIFENESIN 1200 MG: 600 TABLET, EXTENDED RELEASE ORAL at 20:34

## 2025-08-08 RX ADMIN — IPRATROPIUM BROMIDE AND ALBUTEROL SULFATE 3 ML: .5; 3 SOLUTION RESPIRATORY (INHALATION) at 19:52

## 2025-08-08 RX ADMIN — DOCUSATE SODIUM 200 MG: 100 CAPSULE, LIQUID FILLED ORAL at 08:57

## 2025-08-08 RX ADMIN — SUCRALFATE 1 G: 1 TABLET ORAL at 11:51

## 2025-08-08 RX ADMIN — ACETYLCYSTEINE 4 ML: 200 SOLUTION ORAL; RESPIRATORY (INHALATION) at 08:22

## 2025-08-08 RX ADMIN — Medication 10 ML: at 20:35

## 2025-08-08 RX ADMIN — SUCRALFATE 1 G: 1 TABLET ORAL at 08:57

## 2025-08-08 RX ADMIN — POLYETHYLENE GLYCOL 3350 17 G: 17 POWDER, FOR SOLUTION ORAL at 08:57

## 2025-08-08 RX ADMIN — SUCRALFATE 1 G: 1 TABLET ORAL at 17:07

## 2025-08-08 RX ADMIN — BUDESONIDE AND FORMOTEROL FUMARATE DIHYDRATE 2 PUFF: 160; 4.5 AEROSOL RESPIRATORY (INHALATION) at 08:22

## 2025-08-08 RX ADMIN — IPRATROPIUM BROMIDE AND ALBUTEROL SULFATE 3 ML: .5; 3 SOLUTION RESPIRATORY (INHALATION) at 12:25

## 2025-08-08 RX ADMIN — BUDESONIDE AND FORMOTEROL FUMARATE DIHYDRATE 2 PUFF: 160; 4.5 AEROSOL RESPIRATORY (INHALATION) at 19:52

## 2025-08-08 RX ADMIN — PANTOPRAZOLE SODIUM 40 MG: 40 TABLET, DELAYED RELEASE ORAL at 08:57

## 2025-08-08 RX ADMIN — FERROUS SULFATE TAB 325 MG (65 MG ELEMENTAL FE) 325 MG: 325 (65 FE) TAB at 08:57

## 2025-08-08 RX ADMIN — FLUOXETINE 20 MG: 20 CAPSULE ORAL at 08:56

## 2025-08-08 RX ADMIN — CETIRIZINE HYDROCHLORIDE 10 MG: 10 TABLET, FILM COATED ORAL at 08:57

## 2025-08-08 RX ADMIN — GABAPENTIN 300 MG: 300 CAPSULE ORAL at 08:57

## 2025-08-08 RX ADMIN — DOXYCYCLINE 100 MG: 100 CAPSULE ORAL at 08:57

## 2025-08-08 RX ADMIN — MENTHOL, ZINC OXIDE 1 APPLICATION: .44; 20.6 OINTMENT TOPICAL at 20:34

## 2025-08-08 RX ADMIN — MENTHOL, ZINC OXIDE 1 APPLICATION: .44; 20.6 OINTMENT TOPICAL at 08:57

## 2025-08-08 RX ADMIN — METOPROLOL TARTRATE 25 MG: 25 TABLET, FILM COATED ORAL at 20:34

## 2025-08-08 RX ADMIN — GUAIFENESIN 1200 MG: 600 TABLET, EXTENDED RELEASE ORAL at 08:57

## 2025-08-08 RX ADMIN — Medication 10 ML: at 08:57

## 2025-08-08 RX ADMIN — DOXYCYCLINE 100 MG: 100 CAPSULE ORAL at 20:34

## 2025-08-08 RX ADMIN — Medication 2.5 MG: at 20:34

## 2025-08-08 RX ADMIN — METHYLPREDNISOLONE SODIUM SUCCINATE 40 MG: 40 INJECTION, POWDER, FOR SOLUTION INTRAMUSCULAR; INTRAVENOUS at 00:44

## 2025-08-08 RX ADMIN — METOPROLOL TARTRATE 25 MG: 25 TABLET, FILM COATED ORAL at 08:57

## 2025-08-08 RX ADMIN — ASPIRIN 81 MG: 81 TABLET, COATED ORAL at 08:56

## 2025-08-08 RX ADMIN — SUCRALFATE 1 G: 1 TABLET ORAL at 20:33

## 2025-08-08 RX ADMIN — IPRATROPIUM BROMIDE AND ALBUTEROL SULFATE 3 ML: .5; 3 SOLUTION RESPIRATORY (INHALATION) at 08:22

## 2025-08-09 DIAGNOSIS — R29.898 RUE WEAKNESS: Primary | ICD-10-CM

## 2025-08-09 LAB
ANION GAP SERPL CALCULATED.3IONS-SCNC: 6.8 MMOL/L (ref 5–15)
BASOPHILS # BLD AUTO: 0.02 10*3/MM3 (ref 0–0.2)
BASOPHILS NFR BLD AUTO: 0.1 % (ref 0–1.5)
BUN SERPL-MCNC: 23 MG/DL (ref 8–23)
BUN/CREAT SERPL: 39.7 (ref 7–25)
CALCIUM SPEC-SCNC: 8 MG/DL (ref 8.6–10.5)
CHLORIDE SERPL-SCNC: 112 MMOL/L (ref 98–107)
CO2 SERPL-SCNC: 23.2 MMOL/L (ref 22–29)
CREAT SERPL-MCNC: 0.58 MG/DL (ref 0.76–1.27)
DEPRECATED RDW RBC AUTO: 48.6 FL (ref 37–54)
EGFRCR SERPLBLD CKD-EPI 2021: 94.4 ML/MIN/1.73
EOSINOPHIL # BLD AUTO: 0 10*3/MM3 (ref 0–0.4)
EOSINOPHIL NFR BLD AUTO: 0 % (ref 0.3–6.2)
ERYTHROCYTE [DISTWIDTH] IN BLOOD BY AUTOMATED COUNT: 14.3 % (ref 12.3–15.4)
GLUCOSE SERPL-MCNC: 125 MG/DL (ref 65–99)
HCT VFR BLD AUTO: 37.3 % (ref 37.5–51)
HGB BLD-MCNC: 12.5 G/DL (ref 13–17.7)
IMM GRANULOCYTES # BLD AUTO: 0.12 10*3/MM3 (ref 0–0.05)
IMM GRANULOCYTES NFR BLD AUTO: 0.7 % (ref 0–0.5)
LYMPHOCYTES # BLD AUTO: 0.75 10*3/MM3 (ref 0.7–3.1)
LYMPHOCYTES NFR BLD AUTO: 4.1 % (ref 19.6–45.3)
MCH RBC QN AUTO: 31.2 PG (ref 26.6–33)
MCHC RBC AUTO-ENTMCNC: 33.5 G/DL (ref 31.5–35.7)
MCV RBC AUTO: 93 FL (ref 79–97)
MONOCYTES # BLD AUTO: 0.51 10*3/MM3 (ref 0.1–0.9)
MONOCYTES NFR BLD AUTO: 2.8 % (ref 5–12)
NEUTROPHILS NFR BLD AUTO: 16.96 10*3/MM3 (ref 1.7–7)
NEUTROPHILS NFR BLD AUTO: 92.3 % (ref 42.7–76)
NRBC BLD AUTO-RTO: 0 /100 WBC (ref 0–0.2)
PLATELET # BLD AUTO: 240 10*3/MM3 (ref 140–450)
PMV BLD AUTO: 9.7 FL (ref 6–12)
POTASSIUM SERPL-SCNC: 3.9 MMOL/L (ref 3.5–5.2)
RBC # BLD AUTO: 4.01 10*6/MM3 (ref 4.14–5.8)
SODIUM SERPL-SCNC: 142 MMOL/L (ref 136–145)
WBC NRBC COR # BLD AUTO: 18.36 10*3/MM3 (ref 3.4–10.8)

## 2025-08-09 PROCEDURE — 94761 N-INVAS EAR/PLS OXIMETRY MLT: CPT

## 2025-08-09 PROCEDURE — 94664 DEMO&/EVAL PT USE INHALER: CPT

## 2025-08-09 PROCEDURE — 25010000002 METHYLPREDNISOLONE PER 40 MG: Performed by: INTERNAL MEDICINE

## 2025-08-09 PROCEDURE — 94799 UNLISTED PULMONARY SVC/PX: CPT

## 2025-08-09 PROCEDURE — 80048 BASIC METABOLIC PNL TOTAL CA: CPT | Performed by: INTERNAL MEDICINE

## 2025-08-09 PROCEDURE — 94760 N-INVAS EAR/PLS OXIMETRY 1: CPT

## 2025-08-09 PROCEDURE — 63710000001 PREDNISONE PER 1 MG: Performed by: INTERNAL MEDICINE

## 2025-08-09 PROCEDURE — 85025 COMPLETE CBC W/AUTO DIFF WBC: CPT | Performed by: INTERNAL MEDICINE

## 2025-08-09 RX ORDER — PREDNISONE 20 MG/1
40 TABLET ORAL
Status: DISCONTINUED | OUTPATIENT
Start: 2025-08-09 | End: 2025-08-12 | Stop reason: HOSPADM

## 2025-08-09 RX ADMIN — MENTHOL, ZINC OXIDE 1 APPLICATION: .44; 20.6 OINTMENT TOPICAL at 22:11

## 2025-08-09 RX ADMIN — DOXYCYCLINE 100 MG: 100 CAPSULE ORAL at 22:10

## 2025-08-09 RX ADMIN — Medication 10 ML: at 09:44

## 2025-08-09 RX ADMIN — MENTHOL, ZINC OXIDE 1 APPLICATION: .44; 20.6 OINTMENT TOPICAL at 09:40

## 2025-08-09 RX ADMIN — SUCRALFATE 1 G: 1 TABLET ORAL at 22:10

## 2025-08-09 RX ADMIN — IPRATROPIUM BROMIDE AND ALBUTEROL SULFATE 3 ML: .5; 3 SOLUTION RESPIRATORY (INHALATION) at 19:17

## 2025-08-09 RX ADMIN — GABAPENTIN 300 MG: 300 CAPSULE ORAL at 09:39

## 2025-08-09 RX ADMIN — ASPIRIN 81 MG: 81 TABLET, COATED ORAL at 09:39

## 2025-08-09 RX ADMIN — IPRATROPIUM BROMIDE AND ALBUTEROL SULFATE 3 ML: .5; 3 SOLUTION RESPIRATORY (INHALATION) at 11:48

## 2025-08-09 RX ADMIN — GABAPENTIN 300 MG: 300 CAPSULE ORAL at 22:09

## 2025-08-09 RX ADMIN — BUDESONIDE AND FORMOTEROL FUMARATE DIHYDRATE 2 PUFF: 160; 4.5 AEROSOL RESPIRATORY (INHALATION) at 07:41

## 2025-08-09 RX ADMIN — METHYLPREDNISOLONE SODIUM SUCCINATE 40 MG: 40 INJECTION, POWDER, FOR SOLUTION INTRAMUSCULAR; INTRAVENOUS at 01:33

## 2025-08-09 RX ADMIN — Medication 10 ML: at 22:12

## 2025-08-09 RX ADMIN — ACETYLCYSTEINE 4 ML: 200 SOLUTION ORAL; RESPIRATORY (INHALATION) at 19:18

## 2025-08-09 RX ADMIN — PREDNISONE 40 MG: 20 TABLET ORAL at 09:39

## 2025-08-09 RX ADMIN — CETIRIZINE HYDROCHLORIDE 10 MG: 10 TABLET, FILM COATED ORAL at 09:39

## 2025-08-09 RX ADMIN — DOCUSATE SODIUM 200 MG: 100 CAPSULE, LIQUID FILLED ORAL at 09:38

## 2025-08-09 RX ADMIN — Medication 4 ML: at 11:48

## 2025-08-09 RX ADMIN — BUDESONIDE AND FORMOTEROL FUMARATE DIHYDRATE 2 PUFF: 160; 4.5 AEROSOL RESPIRATORY (INHALATION) at 19:22

## 2025-08-09 RX ADMIN — POLYETHYLENE GLYCOL 3350 17 G: 17 POWDER, FOR SOLUTION ORAL at 09:38

## 2025-08-09 RX ADMIN — METOPROLOL TARTRATE 25 MG: 25 TABLET, FILM COATED ORAL at 09:39

## 2025-08-09 RX ADMIN — DOCUSATE SODIUM 200 MG: 100 CAPSULE, LIQUID FILLED ORAL at 22:09

## 2025-08-09 RX ADMIN — GUAIFENESIN 1200 MG: 600 TABLET, EXTENDED RELEASE ORAL at 22:09

## 2025-08-09 RX ADMIN — ACETYLCYSTEINE 4 ML: 200 SOLUTION ORAL; RESPIRATORY (INHALATION) at 07:41

## 2025-08-09 RX ADMIN — Medication 2.5 MG: at 22:10

## 2025-08-09 RX ADMIN — FERROUS SULFATE TAB 325 MG (65 MG ELEMENTAL FE) 325 MG: 325 (65 FE) TAB at 09:38

## 2025-08-09 RX ADMIN — SUCRALFATE 1 G: 1 TABLET ORAL at 09:38

## 2025-08-09 RX ADMIN — IPRATROPIUM BROMIDE AND ALBUTEROL SULFATE 3 ML: .5; 3 SOLUTION RESPIRATORY (INHALATION) at 07:41

## 2025-08-09 RX ADMIN — FLUOXETINE 20 MG: 20 CAPSULE ORAL at 09:38

## 2025-08-09 RX ADMIN — DOXYCYCLINE 100 MG: 100 CAPSULE ORAL at 09:39

## 2025-08-09 RX ADMIN — PANTOPRAZOLE SODIUM 40 MG: 40 TABLET, DELAYED RELEASE ORAL at 09:39

## 2025-08-09 RX ADMIN — SUCRALFATE 1 G: 1 TABLET ORAL at 13:05

## 2025-08-09 RX ADMIN — GUAIFENESIN 1200 MG: 600 TABLET, EXTENDED RELEASE ORAL at 09:39

## 2025-08-09 RX ADMIN — SUCRALFATE 1 G: 1 TABLET ORAL at 17:23

## 2025-08-09 RX ADMIN — METOPROLOL TARTRATE 25 MG: 25 TABLET, FILM COATED ORAL at 22:09

## 2025-08-09 RX ADMIN — GABAPENTIN 300 MG: 300 CAPSULE ORAL at 17:23

## 2025-08-10 LAB
ANION GAP SERPL CALCULATED.3IONS-SCNC: 6.4 MMOL/L (ref 5–15)
BASOPHILS # BLD AUTO: 0.02 10*3/MM3 (ref 0–0.2)
BASOPHILS NFR BLD AUTO: 0.1 % (ref 0–1.5)
BUN SERPL-MCNC: 22 MG/DL (ref 8–23)
BUN/CREAT SERPL: 38.6 (ref 7–25)
CALCIUM SPEC-SCNC: 8.1 MG/DL (ref 8.6–10.5)
CHLORIDE SERPL-SCNC: 111 MMOL/L (ref 98–107)
CO2 SERPL-SCNC: 24.6 MMOL/L (ref 22–29)
CREAT SERPL-MCNC: 0.57 MG/DL (ref 0.76–1.27)
DEPRECATED RDW RBC AUTO: 49.9 FL (ref 37–54)
EGFRCR SERPLBLD CKD-EPI 2021: 94.9 ML/MIN/1.73
EOSINOPHIL # BLD AUTO: 0 10*3/MM3 (ref 0–0.4)
EOSINOPHIL NFR BLD AUTO: 0 % (ref 0.3–6.2)
ERYTHROCYTE [DISTWIDTH] IN BLOOD BY AUTOMATED COUNT: 14.6 % (ref 12.3–15.4)
GLUCOSE SERPL-MCNC: 110 MG/DL (ref 65–99)
HCT VFR BLD AUTO: 34.9 % (ref 37.5–51)
HGB BLD-MCNC: 11.9 G/DL (ref 13–17.7)
IMM GRANULOCYTES # BLD AUTO: 0.13 10*3/MM3 (ref 0–0.05)
IMM GRANULOCYTES NFR BLD AUTO: 0.7 % (ref 0–0.5)
LYMPHOCYTES # BLD AUTO: 1.36 10*3/MM3 (ref 0.7–3.1)
LYMPHOCYTES NFR BLD AUTO: 7.4 % (ref 19.6–45.3)
MCH RBC QN AUTO: 31.5 PG (ref 26.6–33)
MCHC RBC AUTO-ENTMCNC: 34.1 G/DL (ref 31.5–35.7)
MCV RBC AUTO: 92.3 FL (ref 79–97)
MONOCYTES # BLD AUTO: 1.2 10*3/MM3 (ref 0.1–0.9)
MONOCYTES NFR BLD AUTO: 6.6 % (ref 5–12)
NEUTROPHILS NFR BLD AUTO: 15.55 10*3/MM3 (ref 1.7–7)
NEUTROPHILS NFR BLD AUTO: 85.2 % (ref 42.7–76)
NRBC BLD AUTO-RTO: 0 /100 WBC (ref 0–0.2)
PLATELET # BLD AUTO: 227 10*3/MM3 (ref 140–450)
PMV BLD AUTO: 10.1 FL (ref 6–12)
POTASSIUM SERPL-SCNC: 3.8 MMOL/L (ref 3.5–5.2)
RBC # BLD AUTO: 3.78 10*6/MM3 (ref 4.14–5.8)
SODIUM SERPL-SCNC: 142 MMOL/L (ref 136–145)
WBC NRBC COR # BLD AUTO: 18.26 10*3/MM3 (ref 3.4–10.8)

## 2025-08-10 PROCEDURE — 80048 BASIC METABOLIC PNL TOTAL CA: CPT | Performed by: INTERNAL MEDICINE

## 2025-08-10 PROCEDURE — 94761 N-INVAS EAR/PLS OXIMETRY MLT: CPT

## 2025-08-10 PROCEDURE — 85025 COMPLETE CBC W/AUTO DIFF WBC: CPT | Performed by: INTERNAL MEDICINE

## 2025-08-10 PROCEDURE — 63710000001 PREDNISONE PER 1 MG: Performed by: INTERNAL MEDICINE

## 2025-08-10 PROCEDURE — 94760 N-INVAS EAR/PLS OXIMETRY 1: CPT

## 2025-08-10 PROCEDURE — 94799 UNLISTED PULMONARY SVC/PX: CPT

## 2025-08-10 PROCEDURE — 94664 DEMO&/EVAL PT USE INHALER: CPT

## 2025-08-10 RX ADMIN — GUAIFENESIN 1200 MG: 600 TABLET, EXTENDED RELEASE ORAL at 09:01

## 2025-08-10 RX ADMIN — SUCRALFATE 1 G: 1 TABLET ORAL at 12:30

## 2025-08-10 RX ADMIN — IPRATROPIUM BROMIDE AND ALBUTEROL SULFATE 3 ML: .5; 3 SOLUTION RESPIRATORY (INHALATION) at 07:18

## 2025-08-10 RX ADMIN — MENTHOL, ZINC OXIDE 1 APPLICATION: .44; 20.6 OINTMENT TOPICAL at 20:43

## 2025-08-10 RX ADMIN — IPRATROPIUM BROMIDE AND ALBUTEROL SULFATE 3 ML: .5; 3 SOLUTION RESPIRATORY (INHALATION) at 20:58

## 2025-08-10 RX ADMIN — ASPIRIN 81 MG: 81 TABLET, COATED ORAL at 09:02

## 2025-08-10 RX ADMIN — FLUOXETINE 20 MG: 20 CAPSULE ORAL at 09:02

## 2025-08-10 RX ADMIN — GABAPENTIN 300 MG: 300 CAPSULE ORAL at 17:27

## 2025-08-10 RX ADMIN — BUDESONIDE AND FORMOTEROL FUMARATE DIHYDRATE 2 PUFF: 160; 4.5 AEROSOL RESPIRATORY (INHALATION) at 07:19

## 2025-08-10 RX ADMIN — METOPROLOL TARTRATE 25 MG: 25 TABLET, FILM COATED ORAL at 20:42

## 2025-08-10 RX ADMIN — SUCRALFATE 1 G: 1 TABLET ORAL at 09:02

## 2025-08-10 RX ADMIN — CETIRIZINE HYDROCHLORIDE 10 MG: 10 TABLET, FILM COATED ORAL at 09:02

## 2025-08-10 RX ADMIN — BUDESONIDE AND FORMOTEROL FUMARATE DIHYDRATE 2 PUFF: 160; 4.5 AEROSOL RESPIRATORY (INHALATION) at 21:11

## 2025-08-10 RX ADMIN — MENTHOL, ZINC OXIDE 1 APPLICATION: .44; 20.6 OINTMENT TOPICAL at 09:05

## 2025-08-10 RX ADMIN — Medication 4 ML: at 21:03

## 2025-08-10 RX ADMIN — GABAPENTIN 300 MG: 300 CAPSULE ORAL at 09:02

## 2025-08-10 RX ADMIN — GUAIFENESIN 1200 MG: 600 TABLET, EXTENDED RELEASE ORAL at 20:42

## 2025-08-10 RX ADMIN — GABAPENTIN 300 MG: 300 CAPSULE ORAL at 20:42

## 2025-08-10 RX ADMIN — IPRATROPIUM BROMIDE AND ALBUTEROL SULFATE 3 ML: .5; 3 SOLUTION RESPIRATORY (INHALATION) at 13:38

## 2025-08-10 RX ADMIN — Medication 4 ML: at 07:19

## 2025-08-10 RX ADMIN — Medication 10 ML: at 14:10

## 2025-08-10 RX ADMIN — DOCUSATE SODIUM 200 MG: 100 CAPSULE, LIQUID FILLED ORAL at 09:01

## 2025-08-10 RX ADMIN — Medication 2.5 MG: at 20:42

## 2025-08-10 RX ADMIN — POLYETHYLENE GLYCOL 3350 17 G: 17 POWDER, FOR SOLUTION ORAL at 09:01

## 2025-08-10 RX ADMIN — SUCRALFATE 1 G: 1 TABLET ORAL at 20:42

## 2025-08-10 RX ADMIN — METOPROLOL TARTRATE 25 MG: 25 TABLET, FILM COATED ORAL at 09:02

## 2025-08-10 RX ADMIN — ACETYLCYSTEINE 4 ML: 200 SOLUTION ORAL; RESPIRATORY (INHALATION) at 07:19

## 2025-08-10 RX ADMIN — SUCRALFATE 1 G: 1 TABLET ORAL at 17:27

## 2025-08-10 RX ADMIN — ACETYLCYSTEINE 4 ML: 200 SOLUTION ORAL; RESPIRATORY (INHALATION) at 21:07

## 2025-08-10 RX ADMIN — PANTOPRAZOLE SODIUM 40 MG: 40 TABLET, DELAYED RELEASE ORAL at 09:02

## 2025-08-10 RX ADMIN — FERROUS SULFATE TAB 325 MG (65 MG ELEMENTAL FE) 325 MG: 325 (65 FE) TAB at 09:02

## 2025-08-10 RX ADMIN — PREDNISONE 40 MG: 20 TABLET ORAL at 09:01

## 2025-08-11 LAB
ANION GAP SERPL CALCULATED.3IONS-SCNC: 6.4 MMOL/L (ref 5–15)
BASOPHILS # BLD AUTO: 0.03 10*3/MM3 (ref 0–0.2)
BASOPHILS NFR BLD AUTO: 0.2 % (ref 0–1.5)
BUN SERPL-MCNC: 22 MG/DL (ref 8–23)
BUN/CREAT SERPL: 40 (ref 7–25)
CALCIUM SPEC-SCNC: 8.2 MG/DL (ref 8.6–10.5)
CHLORIDE SERPL-SCNC: 109 MMOL/L (ref 98–107)
CO2 SERPL-SCNC: 25.6 MMOL/L (ref 22–29)
CREAT SERPL-MCNC: 0.55 MG/DL (ref 0.76–1.27)
DEPRECATED RDW RBC AUTO: 53.3 FL (ref 37–54)
EGFRCR SERPLBLD CKD-EPI 2021: 95.9 ML/MIN/1.73
EOSINOPHIL # BLD AUTO: 0.01 10*3/MM3 (ref 0–0.4)
EOSINOPHIL NFR BLD AUTO: 0.1 % (ref 0.3–6.2)
ERYTHROCYTE [DISTWIDTH] IN BLOOD BY AUTOMATED COUNT: 15.3 % (ref 12.3–15.4)
GLUCOSE SERPL-MCNC: 87 MG/DL (ref 65–99)
HCT VFR BLD AUTO: 36.7 % (ref 37.5–51)
HGB BLD-MCNC: 12.5 G/DL (ref 13–17.7)
IMM GRANULOCYTES # BLD AUTO: 0.11 10*3/MM3 (ref 0–0.05)
IMM GRANULOCYTES NFR BLD AUTO: 0.9 % (ref 0–0.5)
LYMPHOCYTES # BLD AUTO: 1.98 10*3/MM3 (ref 0.7–3.1)
LYMPHOCYTES NFR BLD AUTO: 16 % (ref 19.6–45.3)
MCH RBC QN AUTO: 31.8 PG (ref 26.6–33)
MCHC RBC AUTO-ENTMCNC: 34.1 G/DL (ref 31.5–35.7)
MCV RBC AUTO: 93.4 FL (ref 79–97)
MONOCYTES # BLD AUTO: 1.1 10*3/MM3 (ref 0.1–0.9)
MONOCYTES NFR BLD AUTO: 8.9 % (ref 5–12)
NEUTROPHILS NFR BLD AUTO: 73.9 % (ref 42.7–76)
NEUTROPHILS NFR BLD AUTO: 9.13 10*3/MM3 (ref 1.7–7)
NRBC BLD AUTO-RTO: 0 /100 WBC (ref 0–0.2)
PLATELET # BLD AUTO: 234 10*3/MM3 (ref 140–450)
PMV BLD AUTO: 10.1 FL (ref 6–12)
POTASSIUM SERPL-SCNC: 3.7 MMOL/L (ref 3.5–5.2)
RBC # BLD AUTO: 3.93 10*6/MM3 (ref 4.14–5.8)
SODIUM SERPL-SCNC: 141 MMOL/L (ref 136–145)
WBC NRBC COR # BLD AUTO: 12.36 10*3/MM3 (ref 3.4–10.8)

## 2025-08-11 PROCEDURE — 85025 COMPLETE CBC W/AUTO DIFF WBC: CPT | Performed by: INTERNAL MEDICINE

## 2025-08-11 PROCEDURE — 94664 DEMO&/EVAL PT USE INHALER: CPT

## 2025-08-11 PROCEDURE — 94761 N-INVAS EAR/PLS OXIMETRY MLT: CPT

## 2025-08-11 PROCEDURE — 94799 UNLISTED PULMONARY SVC/PX: CPT

## 2025-08-11 PROCEDURE — 63710000001 PREDNISONE PER 1 MG: Performed by: INTERNAL MEDICINE

## 2025-08-11 PROCEDURE — 80048 BASIC METABOLIC PNL TOTAL CA: CPT | Performed by: INTERNAL MEDICINE

## 2025-08-11 PROCEDURE — 94760 N-INVAS EAR/PLS OXIMETRY 1: CPT

## 2025-08-11 RX ORDER — LIDOCAINE 4 G/G
1 PATCH TOPICAL
Status: DISCONTINUED | OUTPATIENT
Start: 2025-08-11 | End: 2025-08-12 | Stop reason: HOSPADM

## 2025-08-11 RX ADMIN — SUCRALFATE 1 G: 1 TABLET ORAL at 12:13

## 2025-08-11 RX ADMIN — POLYETHYLENE GLYCOL 3350 17 G: 17 POWDER, FOR SOLUTION ORAL at 20:46

## 2025-08-11 RX ADMIN — SUCRALFATE 1 G: 1 TABLET ORAL at 16:44

## 2025-08-11 RX ADMIN — FLUOXETINE 20 MG: 20 CAPSULE ORAL at 09:07

## 2025-08-11 RX ADMIN — Medication 10 ML: at 09:08

## 2025-08-11 RX ADMIN — ACETYLCYSTEINE 4 ML: 200 SOLUTION ORAL; RESPIRATORY (INHALATION) at 19:49

## 2025-08-11 RX ADMIN — GABAPENTIN 300 MG: 300 CAPSULE ORAL at 09:07

## 2025-08-11 RX ADMIN — Medication 2.5 MG: at 20:45

## 2025-08-11 RX ADMIN — METOPROLOL TARTRATE 25 MG: 25 TABLET, FILM COATED ORAL at 20:45

## 2025-08-11 RX ADMIN — GUAIFENESIN 1200 MG: 600 TABLET, EXTENDED RELEASE ORAL at 09:07

## 2025-08-11 RX ADMIN — GUAIFENESIN 1200 MG: 600 TABLET, EXTENDED RELEASE ORAL at 20:45

## 2025-08-11 RX ADMIN — METOPROLOL TARTRATE 25 MG: 25 TABLET, FILM COATED ORAL at 09:06

## 2025-08-11 RX ADMIN — CETIRIZINE HYDROCHLORIDE 10 MG: 10 TABLET, FILM COATED ORAL at 09:07

## 2025-08-11 RX ADMIN — BUDESONIDE AND FORMOTEROL FUMARATE DIHYDRATE 2 PUFF: 160; 4.5 AEROSOL RESPIRATORY (INHALATION) at 20:06

## 2025-08-11 RX ADMIN — IPRATROPIUM BROMIDE AND ALBUTEROL SULFATE 3 ML: .5; 3 SOLUTION RESPIRATORY (INHALATION) at 06:52

## 2025-08-11 RX ADMIN — IPRATROPIUM BROMIDE AND ALBUTEROL SULFATE 3 ML: .5; 3 SOLUTION RESPIRATORY (INHALATION) at 11:24

## 2025-08-11 RX ADMIN — MENTHOL, ZINC OXIDE 1 APPLICATION: .44; 20.6 OINTMENT TOPICAL at 20:46

## 2025-08-11 RX ADMIN — PREDNISONE 40 MG: 20 TABLET ORAL at 09:07

## 2025-08-11 RX ADMIN — SUCRALFATE 1 G: 1 TABLET ORAL at 20:45

## 2025-08-11 RX ADMIN — MENTHOL, ZINC OXIDE 1 APPLICATION: .44; 20.6 OINTMENT TOPICAL at 09:13

## 2025-08-11 RX ADMIN — IPRATROPIUM BROMIDE AND ALBUTEROL SULFATE 3 ML: .5; 3 SOLUTION RESPIRATORY (INHALATION) at 19:49

## 2025-08-11 RX ADMIN — FERROUS SULFATE TAB 325 MG (65 MG ELEMENTAL FE) 325 MG: 325 (65 FE) TAB at 09:07

## 2025-08-11 RX ADMIN — GABAPENTIN 300 MG: 300 CAPSULE ORAL at 16:44

## 2025-08-11 RX ADMIN — LIDOCAINE 1 PATCH: 4 PATCH TOPICAL at 12:13

## 2025-08-11 RX ADMIN — BUDESONIDE AND FORMOTEROL FUMARATE DIHYDRATE 2 PUFF: 160; 4.5 AEROSOL RESPIRATORY (INHALATION) at 07:06

## 2025-08-11 RX ADMIN — SUCRALFATE 1 G: 1 TABLET ORAL at 09:06

## 2025-08-11 RX ADMIN — ACETYLCYSTEINE 4 ML: 200 SOLUTION ORAL; RESPIRATORY (INHALATION) at 06:58

## 2025-08-11 RX ADMIN — GABAPENTIN 300 MG: 300 CAPSULE ORAL at 20:45

## 2025-08-11 RX ADMIN — ASPIRIN 81 MG: 81 TABLET, COATED ORAL at 09:05

## 2025-08-11 RX ADMIN — PANTOPRAZOLE SODIUM 40 MG: 40 TABLET, DELAYED RELEASE ORAL at 09:06

## 2025-08-11 RX ADMIN — Medication 10 ML: at 20:45

## 2025-08-12 VITALS
HEART RATE: 72 BPM | OXYGEN SATURATION: 96 % | WEIGHT: 171.96 LBS | DIASTOLIC BLOOD PRESSURE: 52 MMHG | RESPIRATION RATE: 20 BRPM | SYSTOLIC BLOOD PRESSURE: 90 MMHG | TEMPERATURE: 97.7 F | BODY MASS INDEX: 24.62 KG/M2 | HEIGHT: 70 IN

## 2025-08-12 LAB
ANION GAP SERPL CALCULATED.3IONS-SCNC: 6 MMOL/L (ref 5–15)
BASOPHILS # BLD AUTO: 0.02 10*3/MM3 (ref 0–0.2)
BASOPHILS NFR BLD AUTO: 0.2 % (ref 0–1.5)
BUN SERPL-MCNC: 25 MG/DL (ref 8–23)
BUN/CREAT SERPL: 42.4 (ref 7–25)
CALCIUM SPEC-SCNC: 7.6 MG/DL (ref 8.6–10.5)
CHLORIDE SERPL-SCNC: 111 MMOL/L (ref 98–107)
CO2 SERPL-SCNC: 24 MMOL/L (ref 22–29)
CREAT SERPL-MCNC: 0.59 MG/DL (ref 0.76–1.27)
DEPRECATED RDW RBC AUTO: 50.4 FL (ref 37–54)
EGFRCR SERPLBLD CKD-EPI 2021: 93.9 ML/MIN/1.73
EOSINOPHIL # BLD AUTO: 0 10*3/MM3 (ref 0–0.4)
EOSINOPHIL NFR BLD AUTO: 0 % (ref 0.3–6.2)
ERYTHROCYTE [DISTWIDTH] IN BLOOD BY AUTOMATED COUNT: 14.5 % (ref 12.3–15.4)
GLUCOSE BLDC GLUCOMTR-MCNC: 108 MG/DL (ref 65–99)
GLUCOSE SERPL-MCNC: 90 MG/DL (ref 65–99)
HCT VFR BLD AUTO: 34.9 % (ref 37.5–51)
HGB BLD-MCNC: 11.7 G/DL (ref 13–17.7)
IMM GRANULOCYTES # BLD AUTO: 0.17 10*3/MM3 (ref 0–0.05)
IMM GRANULOCYTES NFR BLD AUTO: 1.5 % (ref 0–0.5)
LYMPHOCYTES # BLD AUTO: 1.85 10*3/MM3 (ref 0.7–3.1)
LYMPHOCYTES NFR BLD AUTO: 16.8 % (ref 19.6–45.3)
MCH RBC QN AUTO: 31.5 PG (ref 26.6–33)
MCHC RBC AUTO-ENTMCNC: 33.5 G/DL (ref 31.5–35.7)
MCV RBC AUTO: 94.1 FL (ref 79–97)
MONOCYTES # BLD AUTO: 0.87 10*3/MM3 (ref 0.1–0.9)
MONOCYTES NFR BLD AUTO: 7.9 % (ref 5–12)
NEUTROPHILS NFR BLD AUTO: 73.6 % (ref 42.7–76)
NEUTROPHILS NFR BLD AUTO: 8.09 10*3/MM3 (ref 1.7–7)
NRBC BLD AUTO-RTO: 0 /100 WBC (ref 0–0.2)
PLATELET # BLD AUTO: 204 10*3/MM3 (ref 140–450)
PMV BLD AUTO: 9.8 FL (ref 6–12)
POTASSIUM SERPL-SCNC: 3.9 MMOL/L (ref 3.5–5.2)
RBC # BLD AUTO: 3.71 10*6/MM3 (ref 4.14–5.8)
SODIUM SERPL-SCNC: 141 MMOL/L (ref 136–145)
WBC NRBC COR # BLD AUTO: 11 10*3/MM3 (ref 3.4–10.8)

## 2025-08-12 PROCEDURE — 85025 COMPLETE CBC W/AUTO DIFF WBC: CPT | Performed by: INTERNAL MEDICINE

## 2025-08-12 PROCEDURE — 94760 N-INVAS EAR/PLS OXIMETRY 1: CPT

## 2025-08-12 PROCEDURE — 63710000001 PREDNISONE PER 1 MG: Performed by: INTERNAL MEDICINE

## 2025-08-12 PROCEDURE — 94761 N-INVAS EAR/PLS OXIMETRY MLT: CPT

## 2025-08-12 PROCEDURE — 80048 BASIC METABOLIC PNL TOTAL CA: CPT | Performed by: INTERNAL MEDICINE

## 2025-08-12 PROCEDURE — 94664 DEMO&/EVAL PT USE INHALER: CPT

## 2025-08-12 PROCEDURE — 94799 UNLISTED PULMONARY SVC/PX: CPT

## 2025-08-12 PROCEDURE — 82948 REAGENT STRIP/BLOOD GLUCOSE: CPT

## 2025-08-12 PROCEDURE — 97530 THERAPEUTIC ACTIVITIES: CPT

## 2025-08-12 RX ORDER — GABAPENTIN 300 MG/1
300 CAPSULE ORAL 3 TIMES DAILY
Qty: 9 CAPSULE | Refills: 0 | Status: SHIPPED | OUTPATIENT
Start: 2025-08-12

## 2025-08-12 RX ORDER — POLYETHYLENE GLYCOL 3350 17 G/17G
17 POWDER, FOR SOLUTION ORAL DAILY
Start: 2025-08-12

## 2025-08-12 RX ORDER — PREDNISONE 10 MG/1
TABLET ORAL
Start: 2025-08-13 | End: 2025-08-17

## 2025-08-12 RX ORDER — ASPIRIN 81 MG/1
81 TABLET ORAL DAILY
Start: 2025-08-13

## 2025-08-12 RX ORDER — METOPROLOL TARTRATE 25 MG/1
25 TABLET, FILM COATED ORAL EVERY 12 HOURS SCHEDULED
Start: 2025-08-12

## 2025-08-12 RX ORDER — ACETYLCYSTEINE 200 MG/ML
4 SOLUTION ORAL; RESPIRATORY (INHALATION)
Start: 2025-08-12

## 2025-08-12 RX ADMIN — IPRATROPIUM BROMIDE AND ALBUTEROL SULFATE 3 ML: .5; 3 SOLUTION RESPIRATORY (INHALATION) at 08:39

## 2025-08-12 RX ADMIN — PREDNISONE 40 MG: 20 TABLET ORAL at 09:19

## 2025-08-12 RX ADMIN — Medication 10 ML: at 09:00

## 2025-08-12 RX ADMIN — GABAPENTIN 300 MG: 300 CAPSULE ORAL at 09:19

## 2025-08-12 RX ADMIN — BUDESONIDE AND FORMOTEROL FUMARATE DIHYDRATE 2 PUFF: 160; 4.5 AEROSOL RESPIRATORY (INHALATION) at 08:43

## 2025-08-12 RX ADMIN — CETIRIZINE HYDROCHLORIDE 10 MG: 10 TABLET, FILM COATED ORAL at 09:19

## 2025-08-12 RX ADMIN — POLYETHYLENE GLYCOL 3350 17 G: 17 POWDER, FOR SOLUTION ORAL at 09:19

## 2025-08-12 RX ADMIN — MENTHOL, ZINC OXIDE 1 APPLICATION: .44; 20.6 OINTMENT TOPICAL at 09:00

## 2025-08-12 RX ADMIN — GUAIFENESIN 1200 MG: 600 TABLET, EXTENDED RELEASE ORAL at 09:19

## 2025-08-12 RX ADMIN — DOCUSATE SODIUM 200 MG: 100 CAPSULE, LIQUID FILLED ORAL at 09:19

## 2025-08-12 RX ADMIN — FLUOXETINE 20 MG: 20 CAPSULE ORAL at 09:19

## 2025-08-12 RX ADMIN — LIDOCAINE 1 PATCH: 4 PATCH TOPICAL at 09:19

## 2025-08-12 RX ADMIN — ASPIRIN 81 MG: 81 TABLET, COATED ORAL at 09:19

## 2025-08-12 RX ADMIN — FERROUS SULFATE TAB 325 MG (65 MG ELEMENTAL FE) 325 MG: 325 (65 FE) TAB at 09:19

## 2025-08-12 RX ADMIN — ACETYLCYSTEINE 4 ML: 200 SOLUTION ORAL; RESPIRATORY (INHALATION) at 08:42

## 2025-08-12 RX ADMIN — SUCRALFATE 1 G: 1 TABLET ORAL at 13:20

## 2025-08-12 RX ADMIN — PANTOPRAZOLE SODIUM 40 MG: 40 TABLET, DELAYED RELEASE ORAL at 09:19

## 2025-08-12 RX ADMIN — IPRATROPIUM BROMIDE AND ALBUTEROL SULFATE 3 ML: .5; 3 SOLUTION RESPIRATORY (INHALATION) at 13:04

## 2025-08-12 RX ADMIN — SUCRALFATE 1 G: 1 TABLET ORAL at 09:19

## (undated) DEVICE — CULT AER/ANAEROB FASTIDIOUS BACT

## (undated) DEVICE — SINGLE USE SUCTION VALVE MAJ-209: Brand: SINGLE USE SUCTION VALVE (STERILE)

## (undated) DEVICE — MSK AIRWY LARYNG LMA PILOT SZ4

## (undated) DEVICE — SNAR POLYP SENSATION STDOVL 27 240 BX40

## (undated) DEVICE — ADAPT SWVL FIBROPTIC BRONCH

## (undated) DEVICE — LN SMPL CO2 SHTRM SD STREAM W/M LUER

## (undated) DEVICE — VITAL SIGNS™ ADULT ANESTHESIA BREATHING CIRCUIT: Brand: VITAL SIGNS™

## (undated) DEVICE — MSK AIRWY LARYNG LMA UNIQUE STD PK SZ4

## (undated) DEVICE — GLV SURG PREMIERPRO ORTHO LTX PF SZ8.5 BRN

## (undated) DEVICE — CANN NASL CO2 TRULINK W/O2 A/

## (undated) DEVICE — APPL CHLORAPREP W/TINT 26ML ORNG

## (undated) DEVICE — BITEBLOCK OMNI BLOC

## (undated) DEVICE — MSK PROC CURAPLEX O2 2/ADAPT 7FT

## (undated) DEVICE — VITAL SIGNS™ JACKSON-REES CIRCUITS: Brand: VITAL SIGNS™

## (undated) DEVICE — SINGLE USE BIOPSY VALVE MAJ-210: Brand: SINGLE USE BIOPSY VALVE (STERILE)

## (undated) DEVICE — NDL SPINE 20G 3 1/2 YEL STRL 1P/U

## (undated) DEVICE — TUBING, SUCTION, 1/4" X 10', STRAIGHT: Brand: MEDLINE

## (undated) DEVICE — TRAP,MUCUS SPECIMEN, 80CC: Brand: MEDLINE

## (undated) DEVICE — ADAPT CLN BIOGUARD AIR/H2O DISP

## (undated) DEVICE — GAUZE,SPONGE,FLUFF,6"X6.75",STRL,10/TRAY: Brand: MEDLINE

## (undated) DEVICE — THE SINGLE USE ETRAP – POLYP TRAP IS USED FOR SUCTION RETRIEVAL OF ENDOSCOPICALLY REMOVED POLYPS.: Brand: ETRAP

## (undated) DEVICE — STPLR SKIN VISISTAT WD 35CT

## (undated) DEVICE — GLIDESHEATH SLENDER STAINLESS STEEL KIT: Brand: GLIDESHEATH SLENDER

## (undated) DEVICE — GLV SURG SENSICARE GREEN W/ALOE PF LF 9 STRL

## (undated) DEVICE — SENSR O2 OXIMAX FNGR A/ 18IN NONSTR

## (undated) DEVICE — KT ORCA ORCAPOD DISP STRL

## (undated) DEVICE — GW EMR FIX EXCHG J STD .035 3MM 260CM

## (undated) DEVICE — LN SMPL O2 NASL/ORL SMART/CAPNOLINE PLS A/

## (undated) DEVICE — PREMIUM WET SKIN PREP TRAY: Brand: MEDLINE INDUSTRIES, INC.

## (undated) DEVICE — FIAPC® PROBE W/ FILTER 2200 A OD 2.3MM/6.9FR; L 2.2M/7.2FT: Brand: ERBE

## (undated) DEVICE — GLV SURG TRIUMPH CLASSIC PF LTX 8 STRL

## (undated) DEVICE — ENCORE® LATEX ORTHO SIZE 8.5, STERILE LATEX POWDER-FREE SURGICAL GLOVE: Brand: ENCORE

## (undated) DEVICE — DRSNG SURESITE WNDW 4X4.5

## (undated) DEVICE — ADHS SKIN DERMABOND TOP ADVANCED

## (undated) DEVICE — Device: Brand: DEFENDO AIR/WATER/SUCTION AND BIOPSY VALVE

## (undated) DEVICE — ERBE NESSY®PLATE 170 SPLIT; 168CM²; CABLE 3M: Brand: ERBE

## (undated) DEVICE — TBG 02 CRUSH RESIST LF CLR 7FT

## (undated) DEVICE — DRSNG WND GZ CURAD OIL EMULSION 3X8IN LF STRL 1PK

## (undated) DEVICE — GW FOR TROCH NAIL 3.2X400MM

## (undated) DEVICE — Device

## (undated) DEVICE — ANTIBACTERIAL UNDYED BRAIDED (POLYGLACTIN 910), SYNTHETIC ABSORBABLE SUTURE: Brand: COATED VICRYL

## (undated) DEVICE — GLV SURG TRIUMPH CLASSIC PF LTX 8.5 STRL

## (undated) DEVICE — SKIN AFFIX SURG ADHESIVE 72/CS 0.55ML: Brand: MEDLINE

## (undated) DEVICE — CATH DIAG IMPULSE FR4 5F 100CM

## (undated) DEVICE — DRSNG TELFA PAD NONADH STR 1S 3X4IN

## (undated) DEVICE — CATH DIAG IMPULSE FL3.5 5F 100CM

## (undated) DEVICE — THE TORRENT IRRIGATION SCOPE CONNECTOR IS USED WITH THE TORRENT IRRIGATION TUBING TO PROVIDE IRRIGATION FLUIDS SUCH AS STERILE WATER DURING GASTROINTESTINAL ENDOSCOPIC PROCEDURES WHEN USED IN CONJUNCTION WITH AN IRRIGATION PUMP (OR ELECTROSURGICAL UNIT).: Brand: TORRENT

## (undated) DEVICE — SUT VIC 1 OS8 27IN J699H

## (undated) DEVICE — MAT FLR ABSORBENT LG 4FT 10 2.5FT

## (undated) DEVICE — DRSNG ADAPTIC 3X16

## (undated) DEVICE — DBD-DRAPE,T,HIP,STERILE: Brand: MEDLINE

## (undated) DEVICE — PK HIP TOTL 40

## (undated) DEVICE — SUT VIC 0 CP1 27IN J267H

## (undated) DEVICE — KT MANIFLD CARDIAC

## (undated) DEVICE — APPL DURAPREP IODOPHOR APL 26ML

## (undated) DEVICE — REFLECTION FLEXIBLE DRILL 35MM: Brand: REFLECTION

## (undated) DEVICE — SYR LUERLOK 20CC BX/50

## (undated) DEVICE — SUT VICRYL 1 CT1 27IN  JJ40G

## (undated) DEVICE — GLV SURG TRIUMPH CLASSIC PF LTX 9 STRL

## (undated) DEVICE — PK CATH CARD 40

## (undated) DEVICE — PK ORTHO MAJ 40

## (undated) DEVICE — AMD ANTIMICROBIAL GAUZE SPONGES,12 PLY USP TYPE VII, 0.2% POLYHEXAMETHYLENE BIGUANIDE HCI (PHMB): Brand: CURITY